# Patient Record
Sex: FEMALE | Race: OTHER | Employment: OTHER | URBAN - METROPOLITAN AREA
[De-identification: names, ages, dates, MRNs, and addresses within clinical notes are randomized per-mention and may not be internally consistent; named-entity substitution may affect disease eponyms.]

---

## 2017-10-17 ENCOUNTER — OFFICE VISIT (OUTPATIENT)
Dept: ORTHOPEDIC SURGERY | Age: 47
End: 2017-10-17

## 2017-10-17 VITALS
DIASTOLIC BLOOD PRESSURE: 82 MMHG | BODY MASS INDEX: 30.49 KG/M2 | HEART RATE: 66 BPM | WEIGHT: 178.6 LBS | SYSTOLIC BLOOD PRESSURE: 123 MMHG | HEIGHT: 64 IN

## 2017-10-17 DIAGNOSIS — M54.12 CERVICAL RADICULAR PAIN: Primary | ICD-10-CM

## 2017-10-17 PROCEDURE — 99203 OFFICE O/P NEW LOW 30 MIN: CPT | Performed by: NURSE PRACTITIONER

## 2017-10-17 PROCEDURE — 72040 X-RAY EXAM NECK SPINE 2-3 VW: CPT | Performed by: NURSE PRACTITIONER

## 2017-10-17 NOTE — LETTER
[address] [facility] [address]  [address] [facility],[address]  [address]  Phone: [phone]  Fax: [address], [address] [facility]        October 20, 2017     [address] [facility] [address]    Patient: Crispin Reese  MR Number: [id number]  YOB: 1970  Date of Visit: 10/17/2017    Dear Dr. Gerald Allenroom:    Thank you for the request for consultation for [facility] to me for the evaluation of   Encounter Diagnosis   Name Primary?  Cervical radicular pain Yes     . Below are the relevant portions of my assessment and plan of care. [facility] Orthopedic Surgery  After Hours Clinic Note      CHIEF COMPLAINT:  Neck pain    History Obtained From:  patient, spouse    HISTORY OF PRESENT ILLNESS:    The patient is a 49 y.o. female who presents with a 10 year history of neck pain that radiates down left arm, but significantly worse over the past few days and month. Pain is described in neck with radiation down left arm and with the intensity of severe. Pain is described as sharp, shooting, rates a 10/10 VAS. Pain is worse with any movement and better with rest. She has accompanying factors of severe headache that is the worst she has ever had. She is having trouble sleeping or doing any activities. She has pain with eating and chewing. She sleeps with her left arm above her head d/t the lymphedema, which she feels places stress on her neck. Years ago, she had Medrol dose pack and Cortisone injections in her trapezius down at  with some improvement in her neck pain, but nothing recent. She was recently at her PCP c/o headache and was treated for a sinus infection and just completed antibiotics with no improvement. She has a h/o CVA in 2001 with residual left sided weakness and Breast Cancer in 2007 with mastectomy, chemotherapy and XRT. She has lymphoedema in her left arm that she has had PT and wears lymphedema sleeve. Past Medical History:        Diagnosis Date    Anxiety     Arthritis     Asthma     Cancer ([facility] [address]     breast    Constipation     Depression     Diabetes mellitus ([facility] [address] [facility]     Gastric ulcer, unspecified as acute or chronic, without mention of hemorrhage, perforation, or obstruction     HIGH CHOLESTEROL     Hypertension     Hypothyroidism     Thyroid disease     TIA (transient ischemic attack)     with occasional left leg and hand weakness       Past Surgical History:        Procedure Laterality Date    BREAST SURGERY      left mastectomy    CARPAL TUNNEL RELEASE      Bilateral    HYSTERECTOMY      TONSILLECTOMY         Social History   Substance Use Topics    Smoking status: Never Smoker    Smokeless tobacco: Not on file    Alcohol use No       Family History   Problem Relation Age of Onset    Asthma Other     Cancer Other     Depression Other     Diabetes Other     Hypertension Other     High Cholesterol Other     Migraines Other            Current Medications:   Current Outpatient Prescriptions   Medication Sig Dispense Refill    loratadine (CLARITIN) 10 MG tablet Take 10 mg by mouth daily      azithromycin (ZITHROMAX) 250 MG tablet Take 500mg (two tablets) on the first day by mouth. Then take 250mg (one tablet) by mouth daily for 4 more days. Complete this medication regimen even if you feel better before it is done. 1 packet 0    cyclobenzaprine (FLEXERIL) 10 MG tablet Take 1 tablet by mouth 3 times daily as needed for Muscle spasms (CAUTION: This medication can cause dizziness.  Do not drive or operate heavy machinery when on this medication.) 20 tablet 0    fluticasone (FLONASE) 50 MCG/ACT nasal spray 1 spray by Nasal route daily 1 Bottle 0    oxyCODONE-acetaminophen (PERCOCET)  MG per tablet Take 1 tablet by mouth every 4 hours as needed for Pain .  Earliest Fill Date: 6/8/17 100 tablet 0    oxyCODONE (OXYCONTIN) 15 MG T12A extended release tablet Take 1 tablet by mouth every 12 hours . Earliest Fill Date: 6/8/17 60 tablet 0    tamoxifen (NOLVADEX) 20 MG tablet Take 1 tablet by mouth daily 90 tablet 6    Elastic Bandages & Supports (FUTURO SUPPORT GLOVE MEDIUM) MISC 1 ready made support glove 1 each 2    Compression Sleeve MISC by Does not apply route 1 sleeve, 20-30 mmHg 1 each 2    ALPRAZolam (XANAX) 1 MG tablet Take 1 mg by mouth nightly as needed for Sleep      TRAZODONE HCL ER PO Take by mouth Indications: takes 20mg daily      LYRICA 50 MG capsule TAKE 1 CAPSULE BY MOUTH TWICE DAILY 60 capsule 0    vitamin D (ERGOCALCIFEROL) 28719 UNITS CAPS capsule Take 1 capsule by mouth once a week. 12 capsule 1    diclofenac sodium (VOLTAREN) 1 % GEL Apply 2 g topically 4 times daily as needed. 100 g 4    Diclofenac Sodium (PENNSAID) 1.5 % SOLN Place 10 drops onto the skin 2 times daily as needed for Pain. 2 Bottle 4    insulin lispro (HUMALOG) 100 UNIT/ML injection Inject 30 Units into the skin 3 times daily (before meals).  insulin glargine (LANTUS) 100 UNIT/ML injection Inject 90 Units into the skin nightly.  levothyroxine (SYNTHROID) 150 MCG tablet Take 75 mcg by mouth daily.  montelukast (SINGULAIR) 10 MG tablet Take 10 mg by mouth daily.  duloxetine (CYMBALTA) 60 MG capsule Take 60 mg by mouth 2 times daily.  clonazepam (KLONOPIN) 0.5 MG tablet Take 1 mg by mouth 2 times daily as needed.  senna (SENOKOT) 8.6 MG tablet Take 2 tablets by mouth daily.  lubiprostone (AMITIZA) 24 MCG capsule Take 24 mcg by mouth 2 times daily (with meals).  lisinopril (PRINIVIL;ZESTRIL) 10 MG tablet Take 10 mg by mouth daily.  metoprolol (LOPRESSOR) 25 MG tablet Take 25 mg by mouth 2 times daily.  simvastatin (ZOCOR) 40 MG tablet Take 40 mg by mouth nightly.  furosemide (LASIX) 40 MG tablet Take 40 mg by mouth 2 times daily.  metolazone (ZAROXOLYN) 2.5 MG tablet Take 5 mg by mouth 2 times daily. No current facility-administered medications for this visit. No current facility-administered medications for this visit. Facility-Administered Medications Ordered in Other Visits: butalbital-acetaminophen-caffeine (FIORICET, ESGIC) per tablet 1 tablet, 1 tablet, Oral, Once    Allergies: Iv dye [iodides] and Trazodone and nefazodone    REVIEW OF SYSTEMS:    CONSTITUTIONAL:  positive for  Headache, negative for fever or chills  MUSCULOSKELETAL:  positive for  myalgias, arthralgias, pain, decreased range of motion  All other ROS reviewed in chart or with patient or family and are grossly negative. PHYSICAL EXAM:    VITALS:  /82   Pulse 66   Ht 5' 4\" (1.626 m)   Wt 178 lb 9.6 oz (81 kg)   BMI 30.66 kg/m²      Ms. Sandra Cotton is a very pleasant 49 y.o. [address] [facility] female who presents today in no acute distress, awake, alert, and oriented. She is well dressed, nourished and  groomed. Patient with normal affect. Height is  5' 4\" (1.626 m), weight is 178 lb 9.6 oz (81 kg), Body mass index is 30.66 kg/m². Resting respiratory rate is 16. Skin warm and dry. Resp deep and easy. Pulse is with regular rate and rhythm       CERVICAL EXAMINATION:  · Inspection: Local inspection shows no step-off or bruising. Cervical alignment is normal.     · Palpation: There is tenderness at the midline, and trapezius bilaterally. Paraspinal tenderness is present. · Range of Motion: decreased d/t pain  · Strength: 5/5 bilateral upper extremities   · Special Tests:    ·   Spurling's positive to the left. ·   Peck and Impingement tests are negative bilaterally. ·  Cubital and Carpal tunnel Tinel's negative bilaterally. · Skin:There are no rashes, ulcerations or lesions in right & left upper extremities.   · Gait & station: normal, patient ambulates without assistance     · Additional Examinations:       · RIGHT UPPER EXTREMITY:  Inspection/examination of the right upper

## 2017-10-18 RX ORDER — METHYLPREDNISOLONE 4 MG/1
TABLET ORAL
Qty: 1 KIT | Refills: 0 | Status: ON HOLD | OUTPATIENT
Start: 2017-10-18 | End: 2018-01-19 | Stop reason: HOSPADM

## 2017-10-18 NOTE — PROGRESS NOTES
/82   Pulse 66   Ht 5' 4\" (1.626 m)   Wt 178 lb 9.6 oz (81 kg)   BMI 30.66 kg/m²     Ms. Argenis Alcala is a very pleasant 49 y.o. [address] [facility] female who presents today in no acute distress, awake, alert, and oriented. She is well dressed, nourished and  groomed. Patient with normal affect. Height is  5' 4\" (1.626 m), weight is 178 lb 9.6 oz (81 kg), Body mass index is 30.66 kg/m². Resting respiratory rate is 16. Skin warm and dry. Resp deep and easy. Pulse is with regular rate and rhythm       CERVICAL EXAMINATION:  · Inspection: Local inspection shows no step-off or bruising. Cervical alignment is normal.     · Palpation: There is tenderness at the midline, and trapezius bilaterally. Paraspinal tenderness is present. · Range of Motion: decreased d/t pain  · Strength: 5/5 bilateral upper extremities   · Special Tests:    ·   Spurling's positive to the left. ·   Peck and Impingement tests are negative bilaterally. ·  Cubital and Carpal tunnel Tinel's negative bilaterally. · Skin:There are no rashes, ulcerations or lesions in right & left upper extremities. · Gait & station: normal, patient ambulates without assistance     · Additional Examinations:       · RIGHT UPPER EXTREMITY:  Inspection/examination of the right upper extremity does not show any tenderness, deformity or injury. Range of motion is unremarkable. There is no gross instability. There are no rashes, ulcerations or lesions. Strength and tone are normal.  · LEFT UPPER EXTREMITY: Inspection/examination of the left upper extremity does not show any tenderness, deformity or injury. Range of motion is decreased d/t pain. There is no gross instability. There are no rashes, ulcerations or lesions. Strength and tone are normal. There is swelling present in left arm d/t lymphedema.     NEUROLOGIC:   Sensory:    Touch:                     Right Upper Extremity:  normal                   Left Upper Extremity:  normal Right Lower Extremity:  normal                  Left Lower Extremity:  normal        DATA:    CBC:   Lab Results   Component Value Date    WBC 5.9 05/16/2017    WBC 6.7 07/08/2010    RBC 3.57 05/16/2017    HGB 9.9 05/16/2017    HCT 33.5 05/16/2017    MCV 93.8 05/16/2017    MCH 27.7 05/16/2017    MCHC 29.6 05/16/2017    RDW 12.5 05/16/2017     05/16/2017    MPV 8.6 07/08/2010     WBC:    Lab Results   Component Value Date    WBC 5.9 05/16/2017    WBC 6.7 07/08/2010     PT/INR:  No results found for: PROTIME, INR  PTT:  No results found for: APTT[APTT        Radiology Review:  Xrays 2 views of the cervical spine and showed loss of the normal cervical lordosis. IMPRESSION/RECOMMENDATIONS  Neck pain  Cervical muscle strain    I discussed the findings with the patient. Will give her Flexeril and Medrol dose pack. She was instructed to rest and apply heat. She was instructed to follow up with spine team in 2 weeks. With the patient c/o the worst headache of her life, she was taken over to [facility] ER for further evaluation.      Malka Sullivan CNP  10/17/2017  8:45 PM

## 2017-10-21 NOTE — COMMUNICATION BODY
[facility] Orthopedic Surgery  After Hours Clinic Note      CHIEF COMPLAINT:  Neck pain    History Obtained From:  patient, spouse    HISTORY OF PRESENT ILLNESS:    The patient is a 49 y.o. female who presents with a 10 year history of neck pain that radiates down left arm, but significantly worse over the past few days and month. Pain is described in neck with radiation down left arm and with the intensity of severe. Pain is described as sharp, shooting, rates a 10/10 VAS. Pain is worse with any movement and better with rest. She has accompanying factors of severe headache that is the worst she has ever had. She is having trouble sleeping or doing any activities. She has pain with eating and chewing. She sleeps with her left arm above her head d/t the lymphedema, which she feels places stress on her neck. Years ago, she had Medrol dose pack and Cortisone injections in her trapezius down at  with some improvement in her neck pain, but nothing recent. She was recently at her PCP c/o headache and was treated for a sinus infection and just completed antibiotics with no improvement. She has a h/o CVA in 2001 with residual left sided weakness and Breast Cancer in 2007 with mastectomy, chemotherapy and XRT. She has lymphoedema in her left arm that she has had PT and wears lymphedema sleeve.      Past Medical History:        Diagnosis Date    Anxiety     Arthritis     Asthma     Cancer ([address]     breast    Constipation     Depression     Diabetes mellitus (HCC)     Gastric ulcer, unspecified as acute or chronic, without mention of hemorrhage, perforation, or obstruction     HIGH CHOLESTEROL     Hypertension     Hypothyroidism     Thyroid disease     TIA (transient ischemic attack)     with occasional left leg and hand weakness       Past Surgical History:        Procedure Laterality Date    BREAST SURGERY      left mastectomy    CARPAL TUNNEL RELEASE      Bilateral    HYSTERECTOMY      TONSILLECTOMY Social History   Substance Use Topics    Smoking status: Never Smoker    Smokeless tobacco: Not on file    Alcohol use No       Family History   Problem Relation Age of Onset    Asthma Other     Cancer Other     Depression Other     Diabetes Other     Hypertension Other     High Cholesterol Other     Migraines Other            Current Medications:   Current Outpatient Prescriptions   Medication Sig Dispense Refill    loratadine (CLARITIN) 10 MG tablet Take 10 mg by mouth daily      azithromycin (ZITHROMAX) 250 MG tablet Take 500mg (two tablets) on the first day by mouth. Then take 250mg (one tablet) by mouth daily for 4 more days. Complete this medication regimen even if you feel better before it is done. 1 packet 0    cyclobenzaprine (FLEXERIL) 10 MG tablet Take 1 tablet by mouth 3 times daily as needed for Muscle spasms (CAUTION: This medication can cause dizziness.  Do not drive or operate heavy machinery when on this medication.) 20 tablet 0    fluticasone (FLONASE) 50 MCG/ACT nasal spray 1 spray by Nasal route daily 1 Bottle 0    oxyCODONE-acetaminophen (PERCOCET)  MG per tablet Take 1 tablet by mouth every 4 hours as needed for Pain . Earliest Fill Date: 6/8/17 100 tablet 0    oxyCODONE (OXYCONTIN) 15 MG T12A extended release tablet Take 1 tablet by mouth every 12 hours .  Earliest Fill Date: 6/8/17 60 tablet 0    tamoxifen (NOLVADEX) 20 MG tablet Take 1 tablet by mouth daily 90 tablet 6    Elastic Bandages & Supports (FUTURO SUPPORT GLOVE MEDIUM) MISC 1 ready made support glove 1 each 2    Compression Sleeve MISC by Does not apply route 1 sleeve, 20-30 mmHg 1 each 2    ALPRAZolam (XANAX) 1 MG tablet Take 1 mg by mouth nightly as needed for Sleep      TRAZODONE HCL ER PO Take by mouth Indications: takes 20mg daily      LYRICA 50 MG capsule TAKE 1 CAPSULE BY MOUTH TWICE DAILY 60 capsule 0    vitamin D (ERGOCALCIFEROL) 26438 UNITS CAPS capsule Take 1 capsule by mouth once a week. 12 capsule 1    diclofenac sodium (VOLTAREN) 1 % GEL Apply 2 g topically 4 times daily as needed. 100 g 4    Diclofenac Sodium (PENNSAID) 1.5 % SOLN Place 10 drops onto the skin 2 times daily as needed for Pain. 2 Bottle 4    insulin lispro (HUMALOG) 100 UNIT/ML injection Inject 30 Units into the skin 3 times daily (before meals).  insulin glargine (LANTUS) 100 UNIT/ML injection Inject 90 Units into the skin nightly.  levothyroxine (SYNTHROID) 150 MCG tablet Take 75 mcg by mouth daily.  montelukast (SINGULAIR) 10 MG tablet Take 10 mg by mouth daily.  duloxetine (CYMBALTA) 60 MG capsule Take 60 mg by mouth 2 times daily.  clonazepam (KLONOPIN) 0.5 MG tablet Take 1 mg by mouth 2 times daily as needed.  senna (SENOKOT) 8.6 MG tablet Take 2 tablets by mouth daily.  lubiprostone (AMITIZA) 24 MCG capsule Take 24 mcg by mouth 2 times daily (with meals).  lisinopril (PRINIVIL;ZESTRIL) 10 MG tablet Take 10 mg by mouth daily.  metoprolol (LOPRESSOR) 25 MG tablet Take 25 mg by mouth 2 times daily.  simvastatin (ZOCOR) 40 MG tablet Take 40 mg by mouth nightly.  furosemide (LASIX) 40 MG tablet Take 40 mg by mouth 2 times daily.  metolazone (ZAROXOLYN) 2.5 MG tablet Take 5 mg by mouth 2 times daily. No current facility-administered medications for this visit. No current facility-administered medications for this visit. Facility-Administered Medications Ordered in Other Visits: butalbital-acetaminophen-caffeine (FIORICET, ESGIC) per tablet 1 tablet, 1 tablet, Oral, Once    Allergies: Iv dye [iodides] and Trazodone and nefazodone    REVIEW OF SYSTEMS:    CONSTITUTIONAL:  positive for  Headache, negative for fever or chills  MUSCULOSKELETAL:  positive for  myalgias, arthralgias, pain, decreased range of motion  All other ROS reviewed in chart or with patient or family and are grossly negative.        PHYSICAL EXAM:    VITALS: /82   Pulse 66   Ht 5' 4\" (1.626 m)   Wt 178 lb 9.6 oz (81 kg)   BMI 30.66 kg/m²     Ms. Natalio Navarro is a very pleasant 49 y.o. [address] [facility] female who presents today in no acute distress, awake, alert, and oriented. She is well dressed, nourished and  groomed. Patient with normal affect. Height is  5' 4\" (1.626 m), weight is 178 lb 9.6 oz (81 kg), Body mass index is 30.66 kg/m². Resting respiratory rate is 16. Skin warm and dry. Resp deep and easy. Pulse is with regular rate and rhythm       CERVICAL EXAMINATION:  · Inspection: Local inspection shows no step-off or bruising. Cervical alignment is normal.     · Palpation: There is tenderness at the midline, and trapezius bilaterally. Paraspinal tenderness is present. · Range of Motion: decreased d/t pain  · Strength: 5/5 bilateral upper extremities   · Special Tests:    ·   Spurling's positive to the left. ·   Peck and Impingement tests are negative bilaterally. ·  Cubital and Carpal tunnel Tinel's negative bilaterally. · Skin:There are no rashes, ulcerations or lesions in right & left upper extremities. · Gait & station: normal, patient ambulates without assistance     · Additional Examinations:       · RIGHT UPPER EXTREMITY:  Inspection/examination of the right upper extremity does not show any tenderness, deformity or injury. Range of motion is unremarkable. There is no gross instability. There are no rashes, ulcerations or lesions. Strength and tone are normal.  · LEFT UPPER EXTREMITY: Inspection/examination of the left upper extremity does not show any tenderness, deformity or injury. Range of motion is decreased d/t pain. There is no gross instability. There are no rashes, ulcerations or lesions. Strength and tone are normal. There is swelling present in left arm d/t lymphedema.     NEUROLOGIC:   Sensory:    Touch:                     Right Upper Extremity:  normal                   Left Upper Extremity:  normal

## 2017-10-31 ENCOUNTER — OFFICE VISIT (OUTPATIENT)
Dept: CARDIOLOGY CLINIC | Age: 47
End: 2017-10-31

## 2017-10-31 VITALS
DIASTOLIC BLOOD PRESSURE: 73 MMHG | SYSTOLIC BLOOD PRESSURE: 122 MMHG | HEIGHT: 64 IN | HEART RATE: 91 BPM | WEIGHT: 182 LBS | BODY MASS INDEX: 31.07 KG/M2

## 2017-10-31 DIAGNOSIS — R07.9 CHEST PAIN, UNSPECIFIED TYPE: Primary | ICD-10-CM

## 2017-10-31 PROCEDURE — G8427 DOCREV CUR MEDS BY ELIG CLIN: HCPCS | Performed by: INTERNAL MEDICINE

## 2017-10-31 PROCEDURE — G8484 FLU IMMUNIZE NO ADMIN: HCPCS | Performed by: INTERNAL MEDICINE

## 2017-10-31 PROCEDURE — 93000 ELECTROCARDIOGRAM COMPLETE: CPT | Performed by: INTERNAL MEDICINE

## 2017-10-31 PROCEDURE — G8417 CALC BMI ABV UP PARAM F/U: HCPCS | Performed by: INTERNAL MEDICINE

## 2017-10-31 PROCEDURE — 99204 OFFICE O/P NEW MOD 45 MIN: CPT | Performed by: INTERNAL MEDICINE

## 2017-10-31 PROCEDURE — 1036F TOBACCO NON-USER: CPT | Performed by: INTERNAL MEDICINE

## 2017-10-31 NOTE — PROGRESS NOTES
[facility]  Cardiac Consult     Referring Provider:  Raissa Aguilar     Chief Complaint   Patient presents with   Juancho Veliz Cardiologist    Chest Pain     Pt has been having chest pains that have gotten worse in the last month. History of Present Illness:  50 y/o female with h/o breast CA s/p mastectomy presents for consultation for chest pain. History taken with assistance of interpretor. She has a h/o CAD with remote cardiac cath at [facility].  says that attempt was made for stent placement but was unsuccessful. I do not have those records. Repeat cardiac cath at [facility] 2014 showed diffuse LAD disease and small vessel with normal RCA and LCX. Treated medically. She apparently has had issues with fluid overload. Multiple ECHO's normal. Cardiac MRI performed and was normal with ejection fraction of 61% and no evidence of constriction. Last stress 2/2016 was normal.    She has multiple complaints. She has dyspnea with exertion. Also sharp chest and back pain. Relieved by rest. Present for months. Also complains of bloating. She has lymphedema of left arm from prior mastectomy. Walked in [place] today. Walked 50 ft and felt dyspneic with pain in left back. SaO2 stayed in 95% range. Past Medical History:   has a past medical history of Anxiety; Arthritis; Asthma; Cancer ([address] Constipation; Depression; Diabetes mellitus ([address] Gastric ulcer, unspecified as acute or chronic, without mention of hemorrhage, perforation, or obstruction; HIGH CHOLESTEROL; Hypertension; Hypothyroidism; Thyroid disease; and TIA (transient ischemic attack). Surgical History:   has a past surgical history that includes Breast surgery; Hysterectomy; Carpal tunnel release; and Tonsillectomy. Social History:   reports that she has never smoked. She has never used smokeless tobacco. She reports that she does not drink alcohol or use drugs.      Family History:  family history includes Asthma in an other family capsule Take 1 capsule by mouth once a week. 12 capsule 1    diclofenac sodium (VOLTAREN) 1 % GEL Apply 2 g topically 4 times daily as needed. 100 g 4    Diclofenac Sodium (PENNSAID) 1.5 % SOLN Place 10 drops onto the skin 2 times daily as needed for Pain. 2 Bottle 4    insulin lispro (HUMALOG) 100 UNIT/ML injection Inject 30 Units into the skin 3 times daily (before meals).  insulin glargine (LANTUS) 100 UNIT/ML injection Inject 90 Units into the skin nightly.  levothyroxine (SYNTHROID) 150 MCG tablet Take 75 mcg by mouth daily.  montelukast (SINGULAIR) 10 MG tablet Take 10 mg by mouth daily.  duloxetine (CYMBALTA) 60 MG capsule Take 60 mg by mouth 2 times daily.  clonazepam (KLONOPIN) 0.5 MG tablet Take 1 mg by mouth 2 times daily as needed.  lubiprostone (AMITIZA) 24 MCG capsule Take 24 mcg by mouth 2 times daily (with meals).  lisinopril (PRINIVIL;ZESTRIL) 10 MG tablet Take 10 mg by mouth daily.  simvastatin (ZOCOR) 40 MG tablet Take 40 mg by mouth nightly.  furosemide (LASIX) 40 MG tablet Take 40 mg by mouth 2 times daily.  [DISCONTINUED] metolazone (ZAROXOLYN) 2.5 MG tablet Take 5 mg by mouth 2 times daily. No facility-administered encounter medications on file as of 10/31/2017. [x] Medications and dosages reviewed.     ROS:  [x]Full ROS obtained and negative except as mentioned in HPI      Physical Examination:    Vitals:    10/31/17 1025   BP: 122/73   Pulse: 91        · GENERAL: Frail chronically ill appearing female in NAD  · NEUROLOGICAL: Alert and oriented  · PSYCH: Calm affect  · SKIN: Warm and dry  · HEENT: Normocephalic, Sclera non-icteric, mucus membranes moist  · NECK: supple, JVP normal  · CAROTID: Normal upstroke, no bruits  · CARDIAC: Normal PMI, regular rate and rhythm, normal S1S2, no murmur, rub, or gallop  · RESPIRATORY: Normal respiratory effort, clear to auscultation bilaterally  · EXTREMITIES: Lymphedema of left arm  · MUSCULOSKELETAL: No joint swelling or tenderness, no chest wall tenderness  · GASTROINTESTINAL: normal bowel sounds, soft, non-tender, no bruit      Assessment:     Chest pain/dyspnea: Etiology unclear. Extensive cardiac evaluation in past all fairly normal, except LAD described as small caliber and diffusely diseased. Stress myoview 2/2016 normal however. With all of her complaints, I think we need to reevaluate with lexiscan myoview and ECHO. I have also asked to obtain cath films to review. Plan:  ECHO, Myoview and Cath films  F/u to review.     Thank you for allowing me to participate in the care of this individual.      Bryon Barros M.D., [facility]

## 2017-10-31 NOTE — LETTER
[address] [address] [address] [address]  Phone: [phone]  Fax: [phone]    Mónica Stuart MD        November 1, 2017     [address] [place], [place] [address] [address]    Patient: Crispin Reese  MR Number: [id number]  YOB: 1970  Date of Visit: 10/31/2017        [address]  Cardiac Consult     Referring Provider:  Gerald Mars     Chief Complaint   Patient presents with   [facility] Establish Cardiologist    Chest Pain     Pt has been having chest pains that have gotten worse in the last month. History of Present Illness:  50 y/o female with h/o breast CA s/p mastectomy presents for consultation for chest pain. History taken with assistance of interpretor. She has a h/o CAD with remote cardiac cath at [facility].  says that attempt was made for stent placement but was unsuccessful. I do not have those records. Repeat cardiac cath at [facility] 2014 showed diffuse LAD disease and small vessel with normal RCA and LCX. Treated medically. She apparently has had issues with fluid overload. Multiple ECHO's normal. Cardiac MRI performed and was normal with ejection fraction of 61% and no evidence of constriction. Last stress 2/2016 was normal.    She has multiple complaints. She has dyspnea with exertion. Also sharp chest and back pain. Relieved by rest. Present for months. Also complains of bloating. She has lymphedema of left arm from prior mastectomy. Walked in [place] today. Walked 50 ft and felt dyspneic with pain in left back. SaO2 stayed in 95% range. Past Medical History:   has a past medical history of Anxiety; Arthritis; Asthma; Cancer ([address] Constipation; Depression; Diabetes mellitus ([address] Gastric ulcer, unspecified as acute or chronic, without mention of hemorrhage, perforation, or obstruction; HIGH CHOLESTEROL; Hypertension; Hypothyroidism; Thyroid disease; and TIA (transient ischemic attack). Surgical History:   has a past surgical history that includes Breast surgery; Hysterectomy; Carpal tunnel release; and Tonsillectomy. Social History:   reports that she has never smoked. She has never used smokeless tobacco. She reports that she does not drink alcohol or use drugs. Family History:  family history includes Asthma in an other family member; Cancer in an other family member; Depression in an other family member; Diabetes in an other family member; High Cholesterol in an other family member; Hypertension in an other family member; Migraines in an other family member. Allergies: Iv dye [iodides] and Trazodone and nefazodone     Home Medications:  Outpatient Encounter Prescriptions as of 10/31/2017   Medication Sig Dispense Refill    senna (SENOKOT) 8.6 MG tablet Take 2 tablets by mouth daily.  metoprolol (LOPRESSOR) 25 MG tablet Take 50 mg by mouth 2 times daily       methylPREDNISolone (MEDROL DOSEPACK) 4 MG tablet Take by mouth. 1 kit 0    loratadine (CLARITIN) 10 MG tablet Take 10 mg by mouth daily      azithromycin (ZITHROMAX) 250 MG tablet Take 500mg (two tablets) on the first day by mouth. Then take 250mg (one tablet) by mouth daily for 4 more days. Complete this medication regimen even if you feel better before it is done. 1 packet 0    fluticasone (FLONASE) 50 MCG/ACT nasal spray 1 spray by Nasal route daily 1 Bottle 0    oxyCODONE-acetaminophen (PERCOCET)  MG per tablet Take 1 tablet by mouth every 4 hours as needed for Pain . Earliest Fill Date: 6/8/17 100 tablet 0    oxyCODONE (OXYCONTIN) 15 MG T12A extended release tablet Take 1 tablet by mouth every 12 hours .  Earliest Fill Date: 6/8/17 60 tablet 0    tamoxifen (NOLVADEX) 20 MG tablet Take 1 tablet by mouth daily 90 tablet 6    Elastic Bandages & Supports (FUTURO SUPPORT GLOVE MEDIUM) MISC 1 ready made support glove 1 each 2    Compression Sleeve MISC by Does not apply route 1 sleeve, 20-30 mmHg 1 each 2    ALPRAZolam (XANAX) 1 MG tablet Take 1 mg by mouth nightly as needed for Sleep      TRAZODONE HCL ER PO Take by mouth Indications: takes 20mg daily      LYRICA 50 MG capsule TAKE 1 CAPSULE BY MOUTH TWICE DAILY (Patient taking differently: Take 25mg in the Morning and 75 mg at night.) 60 capsule 0    vitamin D (ERGOCALCIFEROL) 15153 UNITS CAPS capsule Take 1 capsule by mouth once a week. 12 capsule 1    diclofenac sodium (VOLTAREN) 1 % GEL Apply 2 g topically 4 times daily as needed. 100 g 4    Diclofenac Sodium (PENNSAID) 1.5 % SOLN Place 10 drops onto the skin 2 times daily as needed for Pain. 2 Bottle 4    insulin lispro (HUMALOG) 100 UNIT/ML injection Inject 30 Units into the skin 3 times daily (before meals).  insulin glargine (LANTUS) 100 UNIT/ML injection Inject 90 Units into the skin nightly.  levothyroxine (SYNTHROID) 150 MCG tablet Take 75 mcg by mouth daily.  montelukast (SINGULAIR) 10 MG tablet Take 10 mg by mouth daily.  duloxetine (CYMBALTA) 60 MG capsule Take 60 mg by mouth 2 times daily.  clonazepam (KLONOPIN) 0.5 MG tablet Take 1 mg by mouth 2 times daily as needed.  lubiprostone (AMITIZA) 24 MCG capsule Take 24 mcg by mouth 2 times daily (with meals).  lisinopril (PRINIVIL;ZESTRIL) 10 MG tablet Take 10 mg by mouth daily.  simvastatin (ZOCOR) 40 MG tablet Take 40 mg by mouth nightly.  furosemide (LASIX) 40 MG tablet Take 40 mg by mouth 2 times daily.  [DISCONTINUED] metolazone (ZAROXOLYN) 2.5 MG tablet Take 5 mg by mouth 2 times daily. No facility-administered encounter medications on file as of 10/31/2017. Medications and dosages reviewed.     ROS:  Full ROS obtained and negative except as mentioned in HPI      Physical Examination:    Vitals:    10/31/17 1025   BP: 122/73   Pulse: 91        · GENERAL: Frail chronically ill appearing female in NAD  · NEUROLOGICAL: Alert and oriented

## 2017-10-31 NOTE — PATIENT INSTRUCTIONS
Lexiscan Myoview Stress test and 2D echo next Wednesday 11/8/2017. Will get pictures/images from last cardiac angiogram at [facility]. Patient to sign a release. Follow up at 12:4 pm 11/8/2017 with me.

## 2017-11-01 NOTE — COMMUNICATION BODY
[facility]  Cardiac Consult     Referring Provider:  Diana Marrero     Chief Complaint   Patient presents with   [facility] Establish Cardiologist    Chest Pain     Pt has been having chest pains that have gotten worse in the last month. History of Present Illness:  50 y/o female with h/o breast CA s/p mastectomy presents for consultation for chest pain. History taken with assistance of interpretor. She has a h/o CAD with remote cardiac cath [place] [facility].  says that attempt was made for stent placement but was unsuccessful. I do not have those records. Repeat cardiac cath at [facility] 2014 showed diffuse LAD disease and small vessel with normal RCA and LCX. Treated medically. She apparently has had issues with fluid overload. Multiple ECHO's normal. Cardiac MRI performed and was normal with ejection fraction of 61% and no evidence of constriction. Last stress 2/2016 was normal.    She has multiple complaints. She has dyspnea with exertion. Also sharp chest and back pain. Relieved by rest. Present for months. Also complains of bloating. She has lymphedema of left arm from prior mastectomy. Walked in [place] today. Walked 50 ft and felt dyspneic with pain in left back. SaO2 stayed in 95% range. Past Medical History:   has a past medical history of Anxiety; Arthritis; Asthma; Cancer ([address] Constipation; Depression; Diabetes mellitus ([address] Gastric ulcer, unspecified as acute or chronic, without mention of hemorrhage, perforation, or obstruction; HIGH CHOLESTEROL; Hypertension; Hypothyroidism; Thyroid disease; and TIA (transient ischemic attack). Surgical History:   has a past surgical history that includes Breast surgery; Hysterectomy; Carpal tunnel release; and Tonsillectomy. Social History:   reports that she has never smoked. She has never used smokeless tobacco. She reports that she does not drink alcohol or use drugs.      Family History:  family history includes Asthma in an other family

## 2017-11-02 ENCOUNTER — OFFICE VISIT (OUTPATIENT)
Dept: ORTHOPEDIC SURGERY | Age: 47
End: 2017-11-02

## 2017-11-02 VITALS
BODY MASS INDEX: 29.99 KG/M2 | DIASTOLIC BLOOD PRESSURE: 68 MMHG | SYSTOLIC BLOOD PRESSURE: 100 MMHG | HEART RATE: 84 BPM | HEIGHT: 65 IN | WEIGHT: 180 LBS

## 2017-11-02 DIAGNOSIS — M54.12 CERVICAL RADICULITIS: ICD-10-CM

## 2017-11-02 DIAGNOSIS — M54.2 NECK PAIN: Primary | ICD-10-CM

## 2017-11-02 PROCEDURE — 99203 OFFICE O/P NEW LOW 30 MIN: CPT | Performed by: NURSE PRACTITIONER

## 2017-11-02 PROCEDURE — G8484 FLU IMMUNIZE NO ADMIN: HCPCS | Performed by: NURSE PRACTITIONER

## 2017-11-02 PROCEDURE — 1036F TOBACCO NON-USER: CPT | Performed by: NURSE PRACTITIONER

## 2017-11-02 PROCEDURE — G8417 CALC BMI ABV UP PARAM F/U: HCPCS | Performed by: NURSE PRACTITIONER

## 2017-11-02 PROCEDURE — G8427 DOCREV CUR MEDS BY ELIG CLIN: HCPCS | Performed by: NURSE PRACTITIONER

## 2017-11-02 RX ORDER — GABAPENTIN 300 MG/1
300 CAPSULE ORAL 3 TIMES DAILY
Qty: 40 CAPSULE | Refills: 0 | Status: SHIPPED | OUTPATIENT
Start: 2017-11-02 | End: 2017-11-15 | Stop reason: SDUPTHER

## 2017-11-02 RX ORDER — PREDNISONE 10 MG/1
TABLET ORAL
Qty: 42 TABLET | Refills: 0 | Status: ON HOLD | OUTPATIENT
Start: 2017-11-02 | End: 2018-01-13 | Stop reason: ALTCHOICE

## 2017-11-02 RX ORDER — CYCLOBENZAPRINE HCL 10 MG
10 TABLET ORAL 3 TIMES DAILY PRN
Qty: 40 TABLET | Refills: 0 | Status: SHIPPED | OUTPATIENT
Start: 2017-11-02 | End: 2017-11-15 | Stop reason: SDUPTHER

## 2017-11-02 NOTE — PROGRESS NOTES
History of present illness:   Ms. Isaac Chirinos is a pleasant 49 y.o. old female with a PMH of depression, anxiety, CVA with residual left-sided weakness, DM type II, breast ca kindly referred by Shea Santiago CNP for consultation regarding Ms. Crow Bonner's neck, and bilateral, left>right arm pain. Patient describes chronic neck pain and notes that she has been experiencing left sided numbness with associated pain in all 5 fingers of her left hand. She is currently on OxyContin and oxycodone as well as Flexeril for pain control. She is under the management of a chronic pain provider. The patient did recently try a Medrol Dosepak moderate relief. She reports some difficulty with fine motor control. She has tried trigger point injections and possibly ESIs in the past.  The visit today was completed with a  via the telephone. Past history:   Her past medical, surgical, social history has been reviewed. Her  medications and allergies were reviewed. Review of symptoms:   Pertinent items are noted in HPI. Review of systems reviewed from Patient History For dated on 10/17/17 and available in the patient's chart under the Media tab. Physical examination:   Ms. Ruby Duckworth most recent vitals:  Vitals  BP: 100/68  Pulse: 84  Height: 5' 4.8\" (164.6 cm)  Weight: 180 lb (81.6 kg)  Body mass index is 30.14 kg/m². General Exam:  She is well-developed and well-nourished, is in some obvious pain and alert and oriented to person, place, and time. She demonstrates appropriate mood and affect. She walks with a normal gait. HEENT:   Her cervical flexion, extension, and axial rotation are moderately reduced with pain. Her skin is warm and dry. Her has no tenderness over her cervical spine and no obvious muscle spasm. The skin over her cervical spine is normal without surgical scar.      Upper Extremities:  She has 4+/5 strength of her interosseous muscles, wrist dorsiflexors and volarflexors, biceps, triceps, deltoids, and internal and external rotators of her shoulders, bilaterally. Her biceps, triceps, bracheoradialis, quadriceps and achilles reflexes are 2+, bilaterally. Sensation is intact to light touchfrom C6 to C8. She has no clonus and negative Velazquez's bilaterally. There is apparent lymphedema throughout the left upper extremity. Imaging:   I reviewed 2 views of the cervical spine obtained on 1/17/17 in the office today. They show mild degenerative disc disease. Assessment:   Cervical radiculopathy     Plan:   We discussed treatment options including observation, epidural injections, physical therapy, chiropractic care and further imaging. A prednisone taper was sent patient's pharmacy as well as a refilled Flexeril and gabapentin. She was advised on appropriate administration instructions regarding this. Patient will be referred to Dr. Tasha Fortune as they would prefer to see if female provider near [facility] and they would like to consider injections if applicable. I did order an MRI of the cervical spine in the office today given her progressive difficulty with fine motor control.

## 2017-11-03 ENCOUNTER — TELEPHONE (OUTPATIENT)
Dept: ORTHOPEDIC SURGERY | Age: 47
End: 2017-11-03

## 2017-11-08 ENCOUNTER — OFFICE VISIT (OUTPATIENT)
Dept: CARDIOLOGY CLINIC | Age: 47
End: 2017-11-08

## 2017-11-08 ENCOUNTER — HOSPITAL ENCOUNTER (OUTPATIENT)
Dept: NON INVASIVE DIAGNOSTICS | Age: 47
Discharge: HOME | End: 2017-11-08
Admitting: INTERNAL MEDICINE

## 2017-11-08 VITALS
SYSTOLIC BLOOD PRESSURE: 116 MMHG | HEART RATE: 75 BPM | DIASTOLIC BLOOD PRESSURE: 62 MMHG | BODY MASS INDEX: 31.41 KG/M2 | WEIGHT: 184 LBS | HEIGHT: 64 IN

## 2017-11-08 DIAGNOSIS — R07.9 CHEST PAIN: ICD-10-CM

## 2017-11-08 DIAGNOSIS — R60.1 GENERALIZED EDEMA: ICD-10-CM

## 2017-11-08 DIAGNOSIS — E78.49 OTHER HYPERLIPIDEMIA: ICD-10-CM

## 2017-11-08 DIAGNOSIS — R07.9 CHEST PAIN, UNSPECIFIED TYPE: Primary | ICD-10-CM

## 2017-11-08 LAB
LEFT VENTRICULAR EJECTION FRACTION HIGH VALUE: 60 %
LEFT VENTRICULAR EJECTION FRACTION MODE: NORMAL
LV EF: 60 %
LV EF: 60 %
LV EF: 84 %
LVEF MODALITY: NORMAL
LVEF MODALITY: NORMAL

## 2017-11-08 PROCEDURE — 99214 OFFICE O/P EST MOD 30 MIN: CPT | Performed by: INTERNAL MEDICINE

## 2017-11-08 PROCEDURE — 1036F TOBACCO NON-USER: CPT | Performed by: INTERNAL MEDICINE

## 2017-11-08 PROCEDURE — G8417 CALC BMI ABV UP PARAM F/U: HCPCS | Performed by: INTERNAL MEDICINE

## 2017-11-08 PROCEDURE — G8484 FLU IMMUNIZE NO ADMIN: HCPCS | Performed by: INTERNAL MEDICINE

## 2017-11-08 PROCEDURE — G8427 DOCREV CUR MEDS BY ELIG CLIN: HCPCS | Performed by: INTERNAL MEDICINE

## 2017-11-08 RX ORDER — AMINOPHYLLINE DIHYDRATE 25 MG/ML
100 INJECTION, SOLUTION INTRAVENOUS ONCE
Status: COMPLETED | OUTPATIENT
Start: 2017-11-08 | End: 2017-11-08

## 2017-11-08 RX ORDER — OMEGA-3-ACID ETHYL ESTERS 1 G/1
2 CAPSULE, LIQUID FILLED ORAL 2 TIMES DAILY
COMMUNITY
Start: 2017-11-08 | End: 2018-08-02

## 2017-11-08 RX ADMIN — AMINOPHYLLINE DIHYDRATE 100 MG: 25 INJECTION, SOLUTION INTRAVENOUS at 09:06

## 2017-11-08 NOTE — COMMUNICATION BODY
[address]  Cardiac Consult     Referring Provider:  [facility]     Chief Complaint   Patient presents with    Check-Up     No cardiac complaints. Echo/Stress test prior. History of Present Illness:  50 y/o female with h/o breast CA s/p mastectomy presents for consultation for chest pain. History taken with assistance of interpretor. She has a h/o CAD with [place] [facility]  says that attempt was made for stent placement but was unsuccessful. I do not have those records. Repeat cardiac cath at [facility] 2014 showed diffuse LAD disease and small vessel with normal RCA and LCX. Treated medically. She apparently has had issues with fluid overload. Multiple ECHO's normal. Cardiac MRI performed and was normal with ejection fraction of 61% and no evidence of constriction. Last stress 2/2016 was normal.    She was seen 2 weeks ago with dyspnea and chest pain. ECHO and myoview ordered which she had today. Both normal. She denies chest pain. Says she has issues with fluid retention. Seems worse when on prednisone. Does not use salt    Past Medical History:   has a past medical history of Anxiety; Arthritis; Asthma; Cancer ([address] Constipation; Depression; Diabetes mellitus ([address] Gastric ulcer, unspecified as acute or chronic, without mention of hemorrhage, perforation, or obstruction; HIGH CHOLESTEROL; Hypertension; Hypothyroidism; Thyroid disease; and TIA (transient ischemic attack). Surgical History:   has a past surgical history that includes Breast surgery; Hysterectomy; Carpal tunnel release; and Tonsillectomy. Social History:   reports that she has never smoked. She has never used smokeless tobacco. She reports that she does not drink alcohol or use drugs.      Family History:  family history includes Asthma in an other family member; Cancer in an other family member; Depression in an other family member; Diabetes in an other family member; High Cholesterol in an other PMI, regular rate and rhythm, normal S1S2, no murmur, rub, or gallop  · RESPIRATORY: Normal respiratory effort, clear to auscultation bilaterally  · EXTREMITIES: Lymphedema of left arm, No LE edema  · MUSCULOSKELETAL: No joint swelling or tenderness, no chest wall tenderness  · GASTROINTESTINAL: normal bowel sounds, soft, non-tender, no bruit      Assessment:     Chest pain/dyspnea: Stress and ECHO again normal. Cath films reviewed. Normal, except there is a distal LAD or septal  that is small and appears intramyocardial with bridging throughout. I do not believe this is symptomatic and stress suggests no ischemia. Stalbe. Hyperlipidemia: controlled on statin    Edema: None today. Weights very stable over last 2 years.  I suspect she may have some retension when on high dose steroids    Plan:  Stable cardiac status  Same meds  F/u 6 months  Reviewed at length with pt and     Thank you for allowing me to participate in the care of this individual.      Ashley Coreas M.D., [facility]

## 2017-11-08 NOTE — PATIENT INSTRUCTIONS
Weight gain can be from the steroids, retains fluid.  Watch sodium intake  Continue with the Lasix( furosemide) twice daily

## 2017-11-08 NOTE — LETTER
has a past surgical history that includes Breast surgery; Hysterectomy; Carpal tunnel release; and Tonsillectomy. Social History:   reports that she has never smoked. She has never used smokeless tobacco. She reports that she does not drink alcohol or use drugs. Family History:  family history includes Asthma in an other family member; Cancer in an other family member; Depression in an other family member; Diabetes in an other family member; High Cholesterol in an other family member; Hypertension in an other family member; Migraines in an other family member. Allergies: Iv dye [iodides] and Trazodone and nefazodone     Home Medications:  Outpatient Encounter Prescriptions as of 11/8/2017   Medication Sig Dispense Refill    omega-3 acid ethyl esters (LOVAZA) 1 g capsule       gabapentin (NEURONTIN) 300 MG capsule Take 1 capsule by mouth 3 times daily 40 capsule 0    predniSONE (DELTASONE) 10 MG tablet 6 tabs day 1&2, 5 tabs day 3&4, 4 tabs day 5&6, 3 tabs day 7&8, 2 tabs day 9&10, 1 tab day 11&12 42 tablet 0    cyclobenzaprine (FLEXERIL) 10 MG tablet Take 1 tablet by mouth 3 times daily as needed for Muscle spasms 40 tablet 0    methylPREDNISolone (MEDROL DOSEPACK) 4 MG tablet Take by mouth. 1 kit 0    loratadine (CLARITIN) 10 MG tablet Take 10 mg by mouth daily      azithromycin (ZITHROMAX) 250 MG tablet Take 500mg (two tablets) on the first day by mouth. Then take 250mg (one tablet) by mouth daily for 4 more days. Complete this medication regimen even if you feel better before it is done. 1 packet 0    fluticasone (FLONASE) 50 MCG/ACT nasal spray 1 spray by Nasal route daily 1 Bottle 0    oxyCODONE-acetaminophen (PERCOCET)  MG per tablet Take 1 tablet by mouth every 4 hours as needed for Pain . Earliest Fill Date: 6/8/17 100 tablet 0    oxyCODONE (OXYCONTIN) 15 MG T12A extended release tablet Take 1 tablet by mouth every 12 hours .  Earliest Fill Date: 6/8/17 60 tablet 0  tamoxifen (NOLVADEX) 20 MG tablet Take 1 tablet by mouth daily 90 tablet 6    Elastic Bandages & Supports (FUTURO SUPPORT GLOVE MEDIUM) MISC 1 ready made support glove 1 each 2    Compression Sleeve MISC by Does not apply route 1 sleeve, 20-30 mmHg 1 each 2    ALPRAZolam (XANAX) 1 MG tablet Take 1 mg by mouth nightly as needed for Sleep      TRAZODONE HCL ER PO Take by mouth Indications: takes 20mg daily      LYRICA 50 MG capsule TAKE 1 CAPSULE BY MOUTH TWICE DAILY (Patient taking differently: Take 25mg in the Morning and 75 mg at night.) 60 capsule 0    vitamin D (ERGOCALCIFEROL) 17313 UNITS CAPS capsule Take 1 capsule by mouth once a week. 12 capsule 1    diclofenac sodium (VOLTAREN) 1 % GEL Apply 2 g topically 4 times daily as needed. 100 g 4    Diclofenac Sodium (PENNSAID) 1.5 % SOLN Place 10 drops onto the skin 2 times daily as needed for Pain. 2 Bottle 4    insulin lispro (HUMALOG) 100 UNIT/ML injection Inject 30 Units into the skin 3 times daily (before meals).  insulin glargine (LANTUS) 100 UNIT/ML injection Inject 90 Units into the skin nightly.  levothyroxine (SYNTHROID) 150 MCG tablet Take 75 mcg by mouth daily.  montelukast (SINGULAIR) 10 MG tablet Take 10 mg by mouth daily.  duloxetine (CYMBALTA) 60 MG capsule Take 60 mg by mouth 2 times daily.  clonazepam (KLONOPIN) 0.5 MG tablet Take 1 mg by mouth 2 times daily as needed.  senna (SENOKOT) 8.6 MG tablet Take 2 tablets by mouth daily.  lubiprostone (AMITIZA) 24 MCG capsule Take 24 mcg by mouth 2 times daily (with meals).  lisinopril (PRINIVIL;ZESTRIL) 10 MG tablet Take 2.5 mg by mouth daily       metoprolol (LOPRESSOR) 25 MG tablet Take 50 mg by mouth 2 times daily       simvastatin (ZOCOR) 40 MG tablet Take 40 mg by mouth nightly.  furosemide (LASIX) 40 MG tablet Take 40 mg by mouth 2 times daily.

## 2017-11-08 NOTE — PROGRESS NOTES
PMI, regular rate and rhythm, normal S1S2, no murmur, rub, or gallop  · RESPIRATORY: Normal respiratory effort, clear to auscultation bilaterally  · EXTREMITIES: Lymphedema of left arm, No LE edema  · MUSCULOSKELETAL: No joint swelling or tenderness, no chest wall tenderness  · GASTROINTESTINAL: normal bowel sounds, soft, non-tender, no bruit      Assessment:     Chest pain/dyspnea: Stress and ECHO again normal. Cath films reviewed. Normal, except there is a distal LAD or septal  that is small and appears intramyocardial with bridging throughout. I do not believe this is symptomatic and stress suggests no ischemia. Stalbe. Hyperlipidemia: controlled on statin    Edema: None today. Weights very stable over last 2 years.  I suspect she may have some retension when on high dose steroids    Plan:  Stable cardiac status  Same meds  F/u 6 months  Reviewed at length with pt and     Thank you for allowing me to participate in the care of this individual.      Shan Castillo M.D., [facility]

## 2017-11-13 ENCOUNTER — TELEPHONE (OUTPATIENT)
Dept: ORTHOPEDIC SURGERY | Age: 47
End: 2017-11-13

## 2017-11-13 NOTE — TELEPHONE ENCOUNTER
Patient spouse  Kev May calling n her behalf  Says that patient needs to see Briana Rosen again- she was seeing him for cervical spine, but he states that she fell the other day and now having back issues    Next [facility] appt with Lizzie Munguia is not until 11/29/17  Can patient be seen sooner    Also asking for a refill of pain med's, did not say what medication  Preferred pharmacy- [facility]  [phone]    Pls call Kev May back at [phone]

## 2017-11-15 ENCOUNTER — TELEPHONE (OUTPATIENT)
Dept: ORTHOPEDIC SURGERY | Age: 47
End: 2017-11-15

## 2017-11-15 RX ORDER — GABAPENTIN 300 MG/1
300 CAPSULE ORAL 3 TIMES DAILY
Qty: 90 CAPSULE | Refills: 0 | Status: SHIPPED | OUTPATIENT
Start: 2017-11-15 | End: 2018-03-13 | Stop reason: ALTCHOICE

## 2017-11-15 RX ORDER — CYCLOBENZAPRINE HCL 10 MG
10 TABLET ORAL 3 TIMES DAILY PRN
Qty: 40 TABLET | Refills: 0 | Status: SHIPPED | OUTPATIENT
Start: 2017-11-15 | End: 2017-11-25

## 2017-11-20 ENCOUNTER — HOSPITAL ENCOUNTER (OUTPATIENT)
Dept: MRI IMAGING | Age: 47
Discharge: HOME | End: 2017-11-20
Attending: NURSE PRACTITIONER | Admitting: NURSE PRACTITIONER

## 2017-11-20 DIAGNOSIS — M54.12 CERVICAL RADICULITIS: ICD-10-CM

## 2017-11-20 DIAGNOSIS — M54.12 RADICULOPATHY OF CERVICAL REGION: ICD-10-CM

## 2017-11-27 ENCOUNTER — OFFICE VISIT (OUTPATIENT)
Dept: ORTHOPEDIC SURGERY | Age: 47
End: 2017-11-27

## 2017-11-27 VITALS
WEIGHT: 190 LBS | HEIGHT: 64 IN | SYSTOLIC BLOOD PRESSURE: 124 MMHG | DIASTOLIC BLOOD PRESSURE: 77 MMHG | BODY MASS INDEX: 32.44 KG/M2

## 2017-11-27 DIAGNOSIS — M47.22 OSTEOARTHRITIS OF SPINE WITH RADICULOPATHY, CERVICAL REGION: Primary | ICD-10-CM

## 2017-11-27 DIAGNOSIS — M50.30 DDD (DEGENERATIVE DISC DISEASE), CERVICAL: ICD-10-CM

## 2017-11-27 DIAGNOSIS — M48.02 CERVICAL SPINAL STENOSIS: ICD-10-CM

## 2017-11-27 PROCEDURE — 99244 OFF/OP CNSLTJ NEW/EST MOD 40: CPT | Performed by: PHYSICAL MEDICINE & REHABILITATION

## 2017-11-27 PROCEDURE — G8427 DOCREV CUR MEDS BY ELIG CLIN: HCPCS | Performed by: PHYSICAL MEDICINE & REHABILITATION

## 2017-11-27 PROCEDURE — G8484 FLU IMMUNIZE NO ADMIN: HCPCS | Performed by: PHYSICAL MEDICINE & REHABILITATION

## 2017-11-27 PROCEDURE — G8417 CALC BMI ABV UP PARAM F/U: HCPCS | Performed by: PHYSICAL MEDICINE & REHABILITATION

## 2017-11-27 NOTE — PROGRESS NOTES
New Patient: SPINE    Referring Provider:  Juana Spurling, CNP    CHIEF COMPLAINT:    Chief Complaint   Patient presents with    Neck Pain     pain x2 months, no injury, continues to worsen with cooking / ROM / sleeping at a 45 degree angle due to a heart condition    Arm Pain     bilat, down to both hands, also has numbness / tingling       HISTORY OF PRESENT ILLNESS:      · The patient is being sent at the request of Juana Spurling, CNP in consultation as a new spine patient for neck pain and bilateral arm pain. The patient is a 49 y.o. female whom reports she has had neck pain for the past 2 months. She has a  history of breast cancer treated 8 years ago. She was put on tamoxifen and developed swelling. She has had worsening swelling due to heart condition the past 2 months. She has to sleep in a recliner due to her inability to lay flat. She was put on oral prednisone and had worsening swelling. She underwent an MRI of the cervical spine 11/21/2017 and x-rays of the cervical spine on 10/17/2017. The visit today was undertaken with a  as well as with her . She does complain of some difficulty with fine motor skills.     Pain Assessment  Location of Pain: Neck  Location Modifiers: Posterior  Severity of Pain: 7  Quality of Pain:  (stabbing / increased headaches)  Duration of Pain: Persistent  Frequency of Pain: Intermittent  Aggravating Factors: Bending (ROM / laying down without neck support)  Limiting Behavior: Yes  Relieving Factors: Rest (pain medication)  Result of Injury: No  Work-Related Injury: No  Are there other pain locations you wish to document?: No      Associated signs and symptoms:   Neurogenic bowel or bladder symptoms:  no   Perceived weakness:  yes   Difficulty walking:  no    Recent Imaging (within past one year)   Xrays: yes   MRI or CT of spine: yes    Current/Past Treatment:   · Physical Therapy:  none  · Chiropractic:  none  · Injection: yes  · Medications:   NSAIDS:  yes   Muscle relaxer:  none   Steriods:  yes   Neuropathic medications:  cymbalta   Opioids:  oxycodone  · Previous surgery:  no  · Previous surgical consult:  yes                    Past Medical History:   Past Medical History:   Diagnosis Date    Anxiety     Arthritis     Asthma     Cancer ([facility] [address]     breast    Constipation     Depression     Diabetes mellitus ([facility]     Gastric ulcer, unspecified as acute or chronic, without mention of hemorrhage, perforation, or obstruction     HIGH CHOLESTEROL     Hypertension     Hypothyroidism     Thyroid disease     TIA (transient ischemic attack)     with occasional left leg and hand weakness      Past Surgical History:     Past Surgical History:   Procedure Laterality Date    BREAST SURGERY      left mastectomy    CARPAL TUNNEL RELEASE      Bilateral    HYSTERECTOMY      TONSILLECTOMY       Current Medications:     Current Outpatient Prescriptions:     gabapentin (NEURONTIN) 300 MG capsule, Take 1 capsule by mouth 3 times daily, Disp: 90 capsule, Rfl: 0    omega-3 acid ethyl esters (LOVAZA) 1 g capsule, , Disp: , Rfl:     predniSONE (DELTASONE) 10 MG tablet, 6 tabs day 1&2, 5 tabs day 3&4, 4 tabs day 5&6, 3 tabs day 7&8, 2 tabs day 9&10, 1 tab day 11&12, Disp: 42 tablet, Rfl: 0    methylPREDNISolone (MEDROL DOSEPACK) 4 MG tablet, Take by mouth., Disp: 1 kit, Rfl: 0    loratadine (CLARITIN) 10 MG tablet, Take 10 mg by mouth daily, Disp: , Rfl:     azithromycin (ZITHROMAX) 250 MG tablet, Take 500mg (two tablets) on the first day by mouth. Then take 250mg (one tablet) by mouth daily for 4 more days. Complete this medication regimen even if you feel better before it is done., Disp: 1 packet, Rfl: 0    fluticasone (FLONASE) 50 MCG/ACT nasal spray, 1 spray by Nasal route daily, Disp: 1 Bottle, Rfl: 0    oxyCODONE-acetaminophen (PERCOCET)  MG per tablet, Take 1 tablet by mouth every 4 hours as needed for Pain .  Earliest Fill Date: 6/8/17, Disp: 100 tablet, Rfl: 0    oxyCODONE (OXYCONTIN) 15 MG T12A extended release tablet, Take 1 tablet by mouth every 12 hours . Earliest Fill Date: 6/8/17, Disp: 60 tablet, Rfl: 0    tamoxifen (NOLVADEX) 20 MG tablet, Take 1 tablet by mouth daily, Disp: 90 tablet, Rfl: 6    Elastic Bandages & Supports (FUTURO SUPPORT GLOVE MEDIUM) MISC, 1 ready made support glove, Disp: 1 each, Rfl: 2    Compression Sleeve MISC, by Does not apply route 1 sleeve, 20-30 mmHg, Disp: 1 each, Rfl: 2    ALPRAZolam (XANAX) 1 MG tablet, Take 1 mg by mouth nightly as needed for Sleep, Disp: , Rfl:     TRAZODONE HCL ER PO, Take by mouth Indications: takes 20mg daily, Disp: , Rfl:     LYRICA 50 MG capsule, TAKE 1 CAPSULE BY MOUTH TWICE DAILY (Patient taking differently: Take 25mg in the Morning and 75 mg at night.), Disp: 60 capsule, Rfl: 0    vitamin D (ERGOCALCIFEROL) 59229 UNITS CAPS capsule, Take 1 capsule by mouth once a week., Disp: 12 capsule, Rfl: 1    diclofenac sodium (VOLTAREN) 1 % GEL, Apply 2 g topically 4 times daily as needed. , Disp: 100 g, Rfl: 4    Diclofenac Sodium (PENNSAID) 1.5 % SOLN, Place 10 drops onto the skin 2 times daily as needed for Pain., Disp: 2 Bottle, Rfl: 4    insulin lispro (HUMALOG) 100 UNIT/ML injection, Inject 30 Units into the skin 3 times daily (before meals). , Disp: , Rfl:     insulin glargine (LANTUS) 100 UNIT/ML injection, Inject 90 Units into the skin nightly.  , Disp: , Rfl:     levothyroxine (SYNTHROID) 150 MCG tablet, Take 75 mcg by mouth daily. , Disp: , Rfl:     montelukast (SINGULAIR) 10 MG tablet, Take 10 mg by mouth daily. , Disp: , Rfl:     duloxetine (CYMBALTA) 60 MG capsule, Take 60 mg by mouth 2 times daily. , Disp: , Rfl:     clonazepam (KLONOPIN) 0.5 MG tablet, Take 1 mg by mouth 2 times daily as needed. , Disp: , Rfl:     senna (SENOKOT) 8.6 MG tablet, Take 2 tablets by mouth daily.   , Disp: , Rfl:     lubiprostone (AMITIZA) 24 MCG capsule, Take 24 mcg by mouth 2 times daily (with meals). , Disp: , Rfl:     lisinopril (PRINIVIL;ZESTRIL) 10 MG tablet, Take 2.5 mg by mouth daily , Disp: , Rfl:     metoprolol (LOPRESSOR) 25 MG tablet, Take 50 mg by mouth 2 times daily , Disp: , Rfl:     simvastatin (ZOCOR) 40 MG tablet, Take 40 mg by mouth nightly.  , Disp: , Rfl:     furosemide (LASIX) 40 MG tablet, Take 40 mg by mouth 2 times daily. , Disp: , Rfl:   Allergies: Iv dye [iodides] and Trazodone and nefazodone  Social History:    reports that she has never smoked. She has never used smokeless tobacco. She reports that she does not drink alcohol or use drugs. Family History:   Family History   Problem Relation Age of Onset    Asthma Other     Cancer Other     Depression Other     Diabetes Other     Hypertension Other     High Cholesterol Other     Migraines Other          REVIEW OF SYSTEMS: Full ROS noted & scanned          PHYSICAL EXAM:    Vitals: Blood pressure 124/77, height 5' 4\" (1.626 m), weight 190 lb (86.2 kg), not currently breastfeeding. GENERAL EXAM:  · General Apparence: Patient is adequately groomed with no evidence of malnutrition. · Psychiatric: Orientation: The patient is oriented to time, place and person. The patient's mood and affect are appropriate   · Vascular: Examination reveals no swelling except in the entire left upper extremity due to lymphedema and palpation reveals no tenderness in upper or lower extremities. Good capillary refill. · The lymphatic examination of the neck, right axilla and groin reveals all areas to be without enlargement or induration. Lymph edema is noted in the left upper extremity   Sensation is intact without deficit in the upper and lower extremities to light touch and pinprick  · Coordination of the upper and lower extremities are normal.    CERVICAL EXAMINATION:  · Inspection: Local inspection shows no step-off or bruising.   Cervical alignment is normal. No instability is noted.  · Palpation and Percussion: No evidence of tenderness at the midline, and trapezius. Paraspinal tenderness is not present. There is no paraspinal spasm. · Range of Motion:  limited by 50% in all planes due to pain   · Strength: 5/5 bilateral upper extremities  · Skin:There are no rashes, ulcerations or lesions in right & left upper extremities. · Reflexes: Bilaterally triceps, biceps and brachioradialis are 1+. Clonus absent bilaterally at the feet. No pathological reflexes are noted. · Gait & station: normal, patient ambulates without assistance  · Additional Examinations:  · RIGHT UPPER EXTREMITY:  Inspection/examination of the right upper extremity does not show any tenderness, deformity or injury. Range of motion is normal and pain-free. There is no gross instability. There are no rashes, ulcerations or lesions. Strength and tone are normal. No atrophy or abnormal movements are noted. · LEFT UPPER EXTREMITY: Inspection/examination of the left upper extremity does not show any tenderness, deformity or injury. Range of motion is normal and pain-free. There is no gross instability. There are no rashes, ulcerations or lesions. Strength and tone are normal. No atrophy or abnormal movements are noted. Lymphedema is noted in the entire left arm    · Additional Examinations:  · RIGHT LOWER EXTREMITY: Inspection/examination of the right lower extremity does not show any tenderness, deformity or injury. Range of motion is unremarkable. There is no gross instability. There are no rashes, ulcerations or lesions. Strength and tone are normal. No atrophy or abnormal movements are noted. · LEFT LOWER EXTREMITY:  Inspection/examination of the left lower extremity does not show any tenderness, deformity or injury. Range of motion is unremarkable. There is no gross instability. There are no rashes, ulcerations or lesions. Strength and tone are normal. No atrophy or abnormal movements are noted.       Diagnostic Testing:    Xrays:   I personally reviewed images of the AP lateral views of the cervical spine from 10/17/2017 showing C4 and C5 anterolisthesis with mild C5 6 6 7 degenerative disc disease  MRI or CT:  MR Cervical spine 11/20/17  1. Degenerative changes of the cervical spine contribute to minimal spinal   canal stenosis at C5-C6.  No spinal canal stenosis at the remaining levels. 2. Degenerative changes to contribute to multilevel neural foraminal   narrowing as above. 3. Mild straightening of the normal cervical lordosis without evidence of   spondylolisthesis. EMG:  None  Results for orders placed or performed during the hospital encounter of 11/08/17   Ejection Fraction Percentage   Result Value Ref Range    Left Ventricular Ejection Fraction 60 %    LEFT VENTRICULAR EJECTION FRACTION MODE Echo     Left Ventricular Ejection Fraction High Value 60 %       Impression (Medical Decision Making):       1. Osteoarthritis of spine with radiculopathy, cervical region    2. Cervical spinal stenosis    3. DDD (degenerative disc disease), cervical        Plan (Medical Decision Making):    1. Medications:  Continue current regimen. 2. PT:  Encouraged to continue with HEP. 3. Further studies:  None further  4. Interventional:  We discussed pursuing a C6/7 IL epidural steroid injection to address the pain. Radiologic imaging and symptoms confirm the pain etiology. Risks, benefits and alternatives of interventional options were discussed. These include and are not limited to bleeding, infection, spinal headache, nerve injury, increased pain and lack of pain relief. The patient verbalized understanding and would like to proceed. The patient will be scheduled accordingly.   5. Healthy Lifestyle Measures:  Patient education material - Anatomic drawings, healthy lifestyle education, osteoporosis prevention, back and neck pain educational information, and advanced imaging preparedness were distributed to the

## 2017-11-30 ENCOUNTER — TELEPHONE (OUTPATIENT)
Dept: CARDIOLOGY CLINIC | Age: 47
End: 2017-11-30

## 2017-11-30 NOTE — TELEPHONE ENCOUNTER
Form from [employer] re dental procedures filled out and signed by tati.   Faxed to [phone] with copy of office note; fax conf # [phone] @ [phone]

## 2017-12-05 ENCOUNTER — TELEPHONE (OUTPATIENT)
Dept: ORTHOPEDIC SURGERY | Age: 47
End: 2017-12-05

## 2017-12-11 ENCOUNTER — TELEPHONE (OUTPATIENT)
Dept: CARDIOLOGY CLINIC | Age: 47
End: 2017-12-11

## 2017-12-11 NOTE — TELEPHONE ENCOUNTER
Spoke with pt's , pt's bp has been running around 140/95 and 130/85 also. Pt c/o dizziness. Recently had a sinus infection was started on some antibiotics and a muscle relaxer when she noticed her BP was getting high.  1 [facility] Jomar BLUM please advise

## 2017-12-20 ENCOUNTER — HOSPITAL ENCOUNTER (OUTPATIENT)
Dept: PAIN MANAGEMENT | Age: 47
Discharge: HOME | End: 2017-12-20
Attending: PHYSICAL MEDICINE & REHABILITATION | Admitting: PHYSICAL MEDICINE & REHABILITATION

## 2017-12-20 VITALS
BODY MASS INDEX: 32.44 KG/M2 | HEART RATE: 66 BPM | DIASTOLIC BLOOD PRESSURE: 69 MMHG | OXYGEN SATURATION: 97 % | WEIGHT: 190 LBS | HEIGHT: 64 IN | SYSTOLIC BLOOD PRESSURE: 105 MMHG | RESPIRATION RATE: 16 BRPM

## 2017-12-20 LAB
GLUCOSE BLD-MCNC: 59 MG/DL (ref 70–99)
GLUCOSE BLD-MCNC: 88 MG/DL (ref 70–99)
PERFORMED ON: ABNORMAL
PERFORMED ON: NORMAL

## 2017-12-20 ASSESSMENT — PAIN DESCRIPTION - DESCRIPTORS: DESCRIPTORS: TINGLING

## 2017-12-20 ASSESSMENT — PAIN - FUNCTIONAL ASSESSMENT
PAIN_FUNCTIONAL_ASSESSMENT: 0-10
PAIN_FUNCTIONAL_ASSESSMENT: 0-10

## 2017-12-20 NOTE — PROGRESS NOTES
CERVICAL INJECTION       Pain Therapy Intra- Procedure Record    Sight marked/ confirmed : Yes   Position: Prone    Prep: chloraprep      Local: lidocaine 1%   Dexamethasone:               Sodium Chloride 9%:         Monitoring: ANA LILIA DAILY RN              C arm : UNRULY Henry RT   Circulator: Asael Abbott RN               Prepped by: Michael Vu MD

## 2017-12-20 NOTE — OP NOTE
Patient:  Ashli Fabian  YOB: 1970  Medical Record #:  [id number]   Place: [address] [place] [address]  Date:  12/20/2017   Physician:  Giuliano Sexton MD    Procedure:  Cervical Epidural Interlaminar Steroid Injection  C6 - C7    Pre-Procedure Diagnosis: Cervical stenosis, Cervical DDD    Post-Procedure Diagnosis: Same    Sedation: Local with 1% Lidocaine 3 ml and 2 mg of IV Versed    EBL: None    Complications: None    Procedure Summary:    The patient was seen in the office for complaints of neck pain. Review of the imaging and physical exam of the patient confirmed the pre-procedure diagnosis. After a thorough discussion of risks, benefits and alternatives informed consent was obtained. The patient was brought to the procedure suite and placed in the prone position. The skin overlying the cervical spine was prepped and draped in the usual sterile fashion. Using fluoroscopic guidance, the C6 - C7 interlaminar space was identified. Through anesthetized skin, a 22 gauge Touhy needle was advanced into the epidural space using continuous loss of resistance to saline technique. Prohance was instilled and an epidurogram was noted without evidence of vascular or intrathecal spread. Following which, 5 ml of a solution containing preservative free normal saline and 10 mg of PF dexamethasone was instilled. The needle was removed and a band-aid applied. The patient was transferred to the post-operative suite in stable condition.

## 2018-01-10 ENCOUNTER — TELEPHONE (OUTPATIENT)
Dept: CARDIOLOGY CLINIC | Age: 48
End: 2018-01-10

## 2018-01-10 ENCOUNTER — OFFICE VISIT (OUTPATIENT)
Dept: PULMONOLOGY | Age: 48
End: 2018-01-10

## 2018-01-10 ENCOUNTER — HOSPITAL ENCOUNTER (OUTPATIENT)
Dept: OTHER | Age: 48
Discharge: HOME | End: 2018-01-10
Attending: INTERNAL MEDICINE | Admitting: INTERNAL MEDICINE

## 2018-01-10 VITALS
RESPIRATION RATE: 24 BRPM | DIASTOLIC BLOOD PRESSURE: 79 MMHG | SYSTOLIC BLOOD PRESSURE: 124 MMHG | HEART RATE: 84 BPM | HEIGHT: 64 IN | WEIGHT: 195 LBS | BODY MASS INDEX: 33.29 KG/M2 | OXYGEN SATURATION: 95 %

## 2018-01-10 DIAGNOSIS — J98.4 RESTRICTIVE LUNG DISEASE: ICD-10-CM

## 2018-01-10 DIAGNOSIS — K21.9 GASTROESOPHAGEAL REFLUX DISEASE, ESOPHAGITIS PRESENCE NOT SPECIFIED: ICD-10-CM

## 2018-01-10 DIAGNOSIS — R06.02 SOB (SHORTNESS OF BREATH): Primary | ICD-10-CM

## 2018-01-10 DIAGNOSIS — J45.30 MILD PERSISTENT ASTHMA WITHOUT COMPLICATION: ICD-10-CM

## 2018-01-10 DIAGNOSIS — R06.02 SOB (SHORTNESS OF BREATH): ICD-10-CM

## 2018-01-10 LAB
ALBUMIN SERPL-MCNC: 4.1 G/DL (ref 3.4–5)
ALP BLD-CCNC: 54 U/L (ref 40–129)
ALT SERPL-CCNC: 14 U/L (ref 10–40)
ANION GAP SERPL CALCULATED.3IONS-SCNC: 14 MMOL/L (ref 3–16)
AST SERPL-CCNC: 18 U/L (ref 15–37)
BASOPHILS ABSOLUTE: 0.1 K/UL (ref 0–0.2)
BASOPHILS RELATIVE PERCENT: 1.3 %
BILIRUB SERPL-MCNC: <0.2 MG/DL (ref 0–1)
BILIRUBIN DIRECT: <0.2 MG/DL (ref 0–0.3)
BILIRUBIN, INDIRECT: NORMAL MG/DL (ref 0–1)
BUN BLDV-MCNC: 37 MG/DL (ref 7–20)
CALCIUM SERPL-MCNC: 9.3 MG/DL (ref 8.3–10.6)
CHLORIDE BLD-SCNC: 94 MMOL/L (ref 99–110)
CO2: 26 MMOL/L (ref 21–32)
CREAT SERPL-MCNC: 0.9 MG/DL (ref 0.6–1.1)
EOSINOPHILS ABSOLUTE: 0.2 K/UL (ref 0–0.6)
EOSINOPHILS RELATIVE PERCENT: 2.1 %
GFR AFRICAN AMERICAN: >60
GFR NON-AFRICAN AMERICAN: >60
GLUCOSE BLD-MCNC: 155 MG/DL (ref 70–99)
HCT VFR BLD CALC: 36.3 % (ref 36–48)
HEMOGLOBIN: 11.7 G/DL (ref 12–16)
LYMPHOCYTES ABSOLUTE: 3.2 K/UL (ref 1–5.1)
LYMPHOCYTES RELATIVE PERCENT: 39.6 %
MCH RBC QN AUTO: 29.3 PG (ref 26–34)
MCHC RBC AUTO-ENTMCNC: 32.2 G/DL (ref 31–36)
MCV RBC AUTO: 91.2 FL (ref 80–100)
MONOCYTES ABSOLUTE: 0.9 K/UL (ref 0–1.3)
MONOCYTES RELATIVE PERCENT: 10.6 %
NEUTROPHILS ABSOLUTE: 3.8 K/UL (ref 1.7–7.7)
NEUTROPHILS RELATIVE PERCENT: 46.4 %
PDW BLD-RTO: 13.3 % (ref 12.4–15.4)
PHOSPHORUS: 2.1 MG/DL (ref 2.5–4.9)
PLATELET # BLD: 253 K/UL (ref 135–450)
PMV BLD AUTO: 10.9 FL (ref 5–10.5)
POTASSIUM SERPL-SCNC: 4.4 MMOL/L (ref 3.5–5.1)
RBC # BLD: 3.98 M/UL (ref 4–5.2)
SODIUM BLD-SCNC: 134 MMOL/L (ref 136–145)
TOTAL PROTEIN: 7.3 G/DL (ref 6.4–8.2)
TSH SERPL DL<=0.05 MIU/L-ACNC: 1.95 UIU/ML (ref 0.27–4.2)
WBC # BLD: 8.2 K/UL (ref 4–11)

## 2018-01-10 PROCEDURE — G8484 FLU IMMUNIZE NO ADMIN: HCPCS | Performed by: INTERNAL MEDICINE

## 2018-01-10 PROCEDURE — G8417 CALC BMI ABV UP PARAM F/U: HCPCS | Performed by: INTERNAL MEDICINE

## 2018-01-10 PROCEDURE — 99244 OFF/OP CNSLTJ NEW/EST MOD 40: CPT | Performed by: INTERNAL MEDICINE

## 2018-01-10 PROCEDURE — G8427 DOCREV CUR MEDS BY ELIG CLIN: HCPCS | Performed by: INTERNAL MEDICINE

## 2018-01-10 RX ORDER — ALBUTEROL SULFATE 90 UG/1
2 AEROSOL, METERED RESPIRATORY (INHALATION) EVERY 6 HOURS PRN
COMMUNITY
End: 2018-04-16 | Stop reason: SDUPTHER

## 2018-01-10 RX ORDER — ALBUTEROL SULFATE 1.25 MG/3ML
1 SOLUTION RESPIRATORY (INHALATION) EVERY 6 HOURS PRN
COMMUNITY
End: 2020-03-25 | Stop reason: ALTCHOICE

## 2018-01-10 NOTE — TELEPHONE ENCOUNTER
Spoke w/ MMK. Per MMK if symptoms are really what her  is describing she should be evaluated in the ER. I spoke w/ pt's  and he agrees to take her to [facility] ER to be evaluated.

## 2018-01-10 NOTE — TELEPHONE ENCOUNTER
Spoke w/ pt's . He states she is \"full of water\". He states her arms, legs, and abdomen are edematous and she has PND. She is sleeping at 45 degree angle to breath, states if flat can hear her lungs \"crunching\". [facility] Patient's  would like your opinion on whether she should go to ER.

## 2018-01-10 NOTE — COMMUNICATION BODY
Assessment:     Assessment:    1. SOB (shortness of breath)     2. Restrictive lung disease     3. Mild persistent asthma without complication     4.  Gastroesophageal reflux disease, esophagitis presence not specified             Plan:     Plan:       · I discussed with patient and her  the above  · I reviewed her previous workup by pulmonary medicine at [facility]  · She has been appropriately treated for possible asthma although her last PFT showed mainly restrictive pattern  · Her last CAT scan from November 2017 showed some mild scarring or fibrosis due to previous radiation of her left breast cancer  · I will order labs and chest x-ray today  · She is allergic to dye and I will order a VQ scan for further evaluation as well  · Educated about GERD related respiratory complications and she might benefit from an EGD due to some dysphagia complaints  · Assessment with exertion and at night  · Return to see in 1 month

## 2018-01-10 NOTE — TELEPHONE ENCOUNTER
Calling to adv that pt has gained a lot of water weight, pt had gained 20 lbs since last office visit, pt is having SOB. Pt spoke with PCP and they adv to call us.  Please call to adv thank you

## 2018-01-11 PROBLEM — I50.33 ACUTE ON CHRONIC DIASTOLIC CONGESTIVE HEART FAILURE (HCC): Status: ACTIVE | Noted: 2018-01-11

## 2018-01-12 PROBLEM — E78.5 HLD (HYPERLIPIDEMIA): Status: ACTIVE | Noted: 2017-11-08

## 2018-01-12 PROBLEM — F32.A ANXIETY AND DEPRESSION: Status: ACTIVE | Noted: 2018-01-12

## 2018-01-12 PROBLEM — F41.9 ANXIETY AND DEPRESSION: Status: ACTIVE | Noted: 2018-01-12

## 2018-01-12 PROBLEM — E11.9 DM2 (DIABETES MELLITUS, TYPE 2) (HCC): Status: ACTIVE | Noted: 2018-01-12

## 2018-01-12 PROBLEM — J45.909 ASTHMA: Status: ACTIVE | Noted: 2018-01-12

## 2018-01-12 PROBLEM — I10 HTN (HYPERTENSION): Status: ACTIVE | Noted: 2018-01-12

## 2018-01-12 PROBLEM — E03.9 HYPOTHYROIDISM: Status: ACTIVE | Noted: 2018-01-12

## 2018-01-12 PROBLEM — Z85.3 HX OF BREAST CANCER: Status: ACTIVE | Noted: 2018-01-12

## 2018-01-22 ENCOUNTER — TELEPHONE (OUTPATIENT)
Dept: CARDIOLOGY CLINIC | Age: 48
End: 2018-01-22

## 2018-01-22 RX ORDER — LISINOPRIL 10 MG/1
5 TABLET ORAL DAILY
Qty: 30 TABLET | Refills: 0 | Status: SHIPPED | OUTPATIENT
Start: 2018-01-22 | End: 2018-01-24 | Stop reason: SDUPTHER

## 2018-01-22 RX ORDER — SIMVASTATIN 20 MG
20 TABLET ORAL NIGHTLY
Qty: 30 TABLET | Refills: 1 | Status: SHIPPED | OUTPATIENT
Start: 2018-01-22 | End: 2018-01-24 | Stop reason: SDUPTHER

## 2018-01-22 RX ORDER — FUROSEMIDE 40 MG/1
40 TABLET ORAL 2 TIMES DAILY
Qty: 60 TABLET | Refills: 1 | Status: SHIPPED | OUTPATIENT
Start: 2018-01-22 | End: 2018-01-24 | Stop reason: SDUPTHER

## 2018-01-22 NOTE — TELEPHONE ENCOUNTER
Calling to find out if ARETHA will sign home health care orders and also to adv that pt had a 9 lb weight gain.  Please call to adv thank you

## 2018-01-23 ENCOUNTER — HOSPITAL ENCOUNTER (OUTPATIENT)
Dept: OTHER | Age: 48
Discharge: HOME | End: 2018-01-31
Attending: INTERNAL MEDICINE | Admitting: INTERNAL MEDICINE

## 2018-01-23 LAB
ANION GAP SERPL CALCULATED.3IONS-SCNC: 15 MMOL/L (ref 3–16)
BUN BLDV-MCNC: 32 MG/DL (ref 7–20)
CALCIUM SERPL-MCNC: 9.7 MG/DL (ref 8.3–10.6)
CHLORIDE BLD-SCNC: 98 MMOL/L (ref 99–110)
CO2: 25 MMOL/L (ref 21–32)
CREAT SERPL-MCNC: 0.6 MG/DL (ref 0.6–1.1)
GFR AFRICAN AMERICAN: >60
GFR NON-AFRICAN AMERICAN: >60
GLUCOSE BLD-MCNC: 94 MG/DL (ref 70–99)
POTASSIUM SERPL-SCNC: 4.1 MMOL/L (ref 3.5–5.1)
PRO-BNP: 365 PG/ML (ref 0–124)
SODIUM BLD-SCNC: 138 MMOL/L (ref 136–145)

## 2018-01-24 ENCOUNTER — OFFICE VISIT (OUTPATIENT)
Dept: CARDIOLOGY CLINIC | Age: 48
End: 2018-01-24

## 2018-01-24 VITALS
BODY MASS INDEX: 32.2 KG/M2 | DIASTOLIC BLOOD PRESSURE: 84 MMHG | OXYGEN SATURATION: 94 % | WEIGHT: 188.6 LBS | HEART RATE: 76 BPM | HEIGHT: 64 IN | SYSTOLIC BLOOD PRESSURE: 124 MMHG

## 2018-01-24 DIAGNOSIS — I10 ESSENTIAL HYPERTENSION: ICD-10-CM

## 2018-01-24 DIAGNOSIS — I25.10 CORONARY ARTERY DISEASE INVOLVING NATIVE CORONARY ARTERY OF NATIVE HEART WITHOUT ANGINA PECTORIS: ICD-10-CM

## 2018-01-24 DIAGNOSIS — I50.30 (HFPEF) HEART FAILURE WITH PRESERVED EJECTION FRACTION (HCC): Primary | ICD-10-CM

## 2018-01-24 PROCEDURE — G8598 ASA/ANTIPLAT THER USED: HCPCS | Performed by: NURSE PRACTITIONER

## 2018-01-24 PROCEDURE — 1036F TOBACCO NON-USER: CPT | Performed by: NURSE PRACTITIONER

## 2018-01-24 PROCEDURE — G8484 FLU IMMUNIZE NO ADMIN: HCPCS | Performed by: NURSE PRACTITIONER

## 2018-01-24 PROCEDURE — G8417 CALC BMI ABV UP PARAM F/U: HCPCS | Performed by: NURSE PRACTITIONER

## 2018-01-24 PROCEDURE — 1111F DSCHRG MED/CURRENT MED MERGE: CPT | Performed by: NURSE PRACTITIONER

## 2018-01-24 PROCEDURE — 99214 OFFICE O/P EST MOD 30 MIN: CPT | Performed by: NURSE PRACTITIONER

## 2018-01-24 PROCEDURE — G8427 DOCREV CUR MEDS BY ELIG CLIN: HCPCS | Performed by: NURSE PRACTITIONER

## 2018-01-24 RX ORDER — FUROSEMIDE 40 MG/1
TABLET ORAL
Qty: 270 TABLET | Refills: 1 | Status: SHIPPED | OUTPATIENT
Start: 2018-01-24 | End: 2018-02-05 | Stop reason: SDUPTHER

## 2018-01-24 RX ORDER — SIMVASTATIN 20 MG
20 TABLET ORAL NIGHTLY
Qty: 90 TABLET | Refills: 3 | Status: SHIPPED | OUTPATIENT
Start: 2018-01-24 | End: 2018-09-06 | Stop reason: SDUPTHER

## 2018-01-24 RX ORDER — LISINOPRIL 2.5 MG/1
2.5 TABLET ORAL DAILY
Qty: 90 TABLET | Refills: 1 | Status: SHIPPED | OUTPATIENT
Start: 2018-01-24 | End: 2018-03-01 | Stop reason: SDUPTHER

## 2018-01-24 NOTE — PATIENT INSTRUCTIONS
1. Increase Lasix (furosemide) to 80 mg in am and 40 mg in pm. Okay to take 60 mg in am and 60 mg in pm.    2. Six hours between Lasix doses. 3. Other medications unchanged. 4. Home health to draw labs in one week to monitor kidney function. 5. Follow up in 2 weeks, earlier for worsening.

## 2018-01-25 ENCOUNTER — TELEPHONE (OUTPATIENT)
Dept: CARDIOLOGY CLINIC | Age: 48
End: 2018-01-25

## 2018-01-26 NOTE — TELEPHONE ENCOUNTER
I spoke with Diane Maxwell and relayed message per ARETHA. She verbalized understanding and said she would get to work on that.

## 2018-01-29 ENCOUNTER — TELEPHONE (OUTPATIENT)
Dept: OTHER | Age: 48
End: 2018-01-29

## 2018-01-29 ENCOUNTER — TELEPHONE (OUTPATIENT)
Dept: CARDIOLOGY CLINIC | Age: 48
End: 2018-01-29

## 2018-01-29 LAB — DIABETIC RETINOPATHY: NORMAL

## 2018-01-29 NOTE — TELEPHONE ENCOUNTER
[facility] FAILURE PROGRAM  TELEPHONE ENCOUNTER FORM    Crow Sharma 1970    ASSESSMENT:   1. Baseline weight:  182 lbs  2. Current weight: 189 lbs  3. Symptoms: edema, weight gain. Patient has weight gain and swelling  4. Diet history: following a low sodium diet Yes  5. Medication history: taking medications as instructed Yes; medication side effects noted No  6. Tobacco history: never  7. Activity history: normal activities of daily living  8. Have you made a lifestyle change since being home? Yes low sodium diet     RECOMMENDATIONS:   1. Medication:none. Adjusting medications with Sagar Moeller N.P.   2. Diet: low sodium   3. MD/ Clinic appointment: 1/24/18 with Sagar Moeller N.P.   4. Other: Patient has swelling and weight gain and productive cough. Will discuss with Sagar Moeller about weight gain. Patient wanting a female family doctor. Will call to help facilitate.            Araceli FOSTER 1/29/2018 1:33 PM

## 2018-01-29 NOTE — TELEPHONE ENCOUNTER
I spoke with Char and relayed message per RGNP. She verbalized understanding and stated that she would call pt and have her f/u with Pulmonolgy.

## 2018-01-30 LAB
ANION GAP SERPL CALCULATED.3IONS-SCNC: 13 MMOL/L (ref 3–16)
BUN BLDV-MCNC: 31 MG/DL (ref 7–20)
CALCIUM SERPL-MCNC: 10.1 MG/DL (ref 8.3–10.6)
CHLORIDE BLD-SCNC: 99 MMOL/L (ref 99–110)
CO2: 30 MMOL/L (ref 21–32)
CREAT SERPL-MCNC: 0.9 MG/DL (ref 0.6–1.1)
GFR AFRICAN AMERICAN: >60
GFR NON-AFRICAN AMERICAN: >60
GLUCOSE BLD-MCNC: 101 MG/DL (ref 70–99)
POTASSIUM SERPL-SCNC: 4.3 MMOL/L (ref 3.5–5.1)
PRO-BNP: 116 PG/ML (ref 0–124)
SODIUM BLD-SCNC: 142 MMOL/L (ref 136–145)

## 2018-01-31 ENCOUNTER — OFFICE VISIT (OUTPATIENT)
Dept: ENDOCRINOLOGY | Age: 48
End: 2018-01-31

## 2018-01-31 VITALS
HEIGHT: 64 IN | SYSTOLIC BLOOD PRESSURE: 121 MMHG | DIASTOLIC BLOOD PRESSURE: 78 MMHG | HEART RATE: 86 BPM | WEIGHT: 193 LBS | BODY MASS INDEX: 32.95 KG/M2

## 2018-01-31 DIAGNOSIS — E11.9 TYPE 2 DIABETES MELLITUS WITHOUT COMPLICATION, WITHOUT LONG-TERM CURRENT USE OF INSULIN (HCC): Primary | ICD-10-CM

## 2018-01-31 DIAGNOSIS — E11.42 DIABETIC POLYNEUROPATHY ASSOCIATED WITH TYPE 2 DIABETES MELLITUS (HCC): ICD-10-CM

## 2018-01-31 DIAGNOSIS — E78.2 MIXED HYPERLIPIDEMIA: ICD-10-CM

## 2018-01-31 DIAGNOSIS — J45.50 SEVERE PERSISTENT ASTHMA WITHOUT COMPLICATION: ICD-10-CM

## 2018-01-31 DIAGNOSIS — I25.10 CORONARY ARTERY DISEASE INVOLVING NATIVE CORONARY ARTERY OF NATIVE HEART WITHOUT ANGINA PECTORIS: ICD-10-CM

## 2018-01-31 DIAGNOSIS — F34.1 DYSTHYMIA: ICD-10-CM

## 2018-01-31 DIAGNOSIS — I89.0 LYMPHEDEMA OF UPPER EXTREMITY FOLLOWING LYMPHADENECTOMY: ICD-10-CM

## 2018-01-31 DIAGNOSIS — Z85.3 HX OF BREAST CANCER: ICD-10-CM

## 2018-01-31 DIAGNOSIS — M79.7 PRIMARY FIBROMYALGIA: ICD-10-CM

## 2018-01-31 DIAGNOSIS — E03.0 CONGENITAL HYPOTHYROIDISM WITH DIFFUSE GOITER: ICD-10-CM

## 2018-01-31 DIAGNOSIS — C50.812 MALIGNANT NEOPLASM OF OVERLAPPING SITES OF LEFT FEMALE BREAST, UNSPECIFIED ESTROGEN RECEPTOR STATUS (HCC): ICD-10-CM

## 2018-01-31 DIAGNOSIS — I10 ESSENTIAL HYPERTENSION: ICD-10-CM

## 2018-01-31 DIAGNOSIS — E89.89 LYMPHEDEMA OF UPPER EXTREMITY FOLLOWING LYMPHADENECTOMY: ICD-10-CM

## 2018-01-31 PROCEDURE — 96160 PT-FOCUSED HLTH RISK ASSMT: CPT | Performed by: INTERNAL MEDICINE

## 2018-01-31 PROCEDURE — 99215 OFFICE O/P EST HI 40 MIN: CPT | Performed by: INTERNAL MEDICINE

## 2018-01-31 ASSESSMENT — PATIENT HEALTH QUESTIONNAIRE - PHQ9
6. FEELING BAD ABOUT YOURSELF - OR THAT YOU ARE A FAILURE OR HAVE LET YOURSELF OR YOUR FAMILY DOWN: 3
4. FEELING TIRED OR HAVING LITTLE ENERGY: 3
7. TROUBLE CONCENTRATING ON THINGS, SUCH AS READING THE NEWSPAPER OR WATCHING TELEVISION: 3
5. POOR APPETITE OR OVEREATING: 3
SUM OF ALL RESPONSES TO PHQ9 QUESTIONS 1 & 2: 6
1. LITTLE INTEREST OR PLEASURE IN DOING THINGS: 3
8. MOVING OR SPEAKING SO SLOWLY THAT OTHER PEOPLE COULD HAVE NOTICED. OR THE OPPOSITE, BEING SO FIGETY OR RESTLESS THAT YOU HAVE BEEN MOVING AROUND A LOT MORE THAN USUAL: 2
3. TROUBLE FALLING OR STAYING ASLEEP: 1
SUM OF ALL RESPONSES TO PHQ QUESTIONS 1-9: 22
10. IF YOU CHECKED OFF ANY PROBLEMS, HOW DIFFICULT HAVE THESE PROBLEMS MADE IT FOR YOU TO DO YOUR WORK, TAKE CARE OF THINGS AT HOME, OR GET ALONG WITH OTHER PEOPLE: 2
9. THOUGHTS THAT YOU WOULD BE BETTER OFF DEAD, OR OF HURTING YOURSELF: 1
2. FEELING DOWN, DEPRESSED OR HOPELESS: 3

## 2018-02-01 ENCOUNTER — TELEPHONE (OUTPATIENT)
Dept: OTHER | Age: 48
End: 2018-02-01

## 2018-02-01 ENCOUNTER — TELEPHONE (OUTPATIENT)
Dept: CARDIOLOGY CLINIC | Age: 48
End: 2018-02-01

## 2018-02-01 ENCOUNTER — HOSPITAL ENCOUNTER (OUTPATIENT)
Dept: OTHER | Age: 48
Discharge: HOME | End: 2018-02-28
Attending: INTERNAL MEDICINE | Admitting: INTERNAL MEDICINE

## 2018-02-01 NOTE — TELEPHONE ENCOUNTER
PT  calling to see if Kyler Frederick HF RN or [facility] got his VM msg from earlier about pts weight being 95.5 lbs?  Pls call to advise Thank you

## 2018-02-02 ENCOUNTER — TELEPHONE (OUTPATIENT)
Dept: CARDIOLOGY | Age: 48
End: 2018-02-02

## 2018-02-02 PROBLEM — F34.1 DYSTHYMIA: Status: ACTIVE | Noted: 2018-02-02

## 2018-02-02 PROBLEM — E11.42 DIABETIC POLYNEUROPATHY ASSOCIATED WITH TYPE 2 DIABETES MELLITUS (HCC): Status: ACTIVE | Noted: 2018-02-02

## 2018-02-02 PROBLEM — J45.50 SEVERE PERSISTENT ASTHMA WITHOUT COMPLICATION: Status: ACTIVE | Noted: 2018-02-02

## 2018-02-02 RX ORDER — METOLAZONE 2.5 MG/1
2.5 TABLET ORAL DAILY
Qty: 1 TABLET | Refills: 0 | Status: ON HOLD | OUTPATIENT
Start: 2018-02-02 | End: 2018-03-25 | Stop reason: HOSPADM

## 2018-02-05 ENCOUNTER — OFFICE VISIT (OUTPATIENT)
Dept: ORTHOPEDIC SURGERY | Age: 48
End: 2018-02-05

## 2018-02-05 VITALS
BODY MASS INDEX: 32.93 KG/M2 | SYSTOLIC BLOOD PRESSURE: 124 MMHG | HEIGHT: 64 IN | WEIGHT: 192.9 LBS | DIASTOLIC BLOOD PRESSURE: 77 MMHG

## 2018-02-05 DIAGNOSIS — M51.36 DDD (DEGENERATIVE DISC DISEASE), LUMBAR: Primary | ICD-10-CM

## 2018-02-05 DIAGNOSIS — M54.16 LUMBAR RADICULOPATHY: ICD-10-CM

## 2018-02-05 DIAGNOSIS — M47.816 SPONDYLOSIS OF LUMBAR REGION WITHOUT MYELOPATHY OR RADICULOPATHY: ICD-10-CM

## 2018-02-05 DIAGNOSIS — Z85.3 HISTORY OF BREAST CANCER: ICD-10-CM

## 2018-02-05 PROCEDURE — 1036F TOBACCO NON-USER: CPT | Performed by: PHYSICAL MEDICINE & REHABILITATION

## 2018-02-05 PROCEDURE — G8417 CALC BMI ABV UP PARAM F/U: HCPCS | Performed by: PHYSICAL MEDICINE & REHABILITATION

## 2018-02-05 PROCEDURE — G8598 ASA/ANTIPLAT THER USED: HCPCS | Performed by: PHYSICAL MEDICINE & REHABILITATION

## 2018-02-05 PROCEDURE — G8427 DOCREV CUR MEDS BY ELIG CLIN: HCPCS | Performed by: PHYSICAL MEDICINE & REHABILITATION

## 2018-02-05 PROCEDURE — G8484 FLU IMMUNIZE NO ADMIN: HCPCS | Performed by: PHYSICAL MEDICINE & REHABILITATION

## 2018-02-05 PROCEDURE — 99214 OFFICE O/P EST MOD 30 MIN: CPT | Performed by: PHYSICAL MEDICINE & REHABILITATION

## 2018-02-05 PROCEDURE — 1111F DSCHRG MED/CURRENT MED MERGE: CPT | Performed by: PHYSICAL MEDICINE & REHABILITATION

## 2018-02-05 RX ORDER — FUROSEMIDE 40 MG/1
TABLET ORAL
Qty: 270 TABLET | Refills: 1
Start: 2018-02-05 | End: 2018-02-13 | Stop reason: ALTCHOICE

## 2018-02-05 NOTE — TELEPHONE ENCOUNTER
I spoke with pt  , he states pt taking 80 mg AM 40mg in afternoon and at night. He stated that pt weight is 200 lb. He verbalized understanding about the new medication and stated they would comply. I spoke with Devan Rousseau at [facility] to verify that labs will be done 2/6. Also to advise of med changes.

## 2018-02-05 NOTE — PROGRESS NOTES
Follow up: SPINE      CHIEF COMPLAINT:    Chief Complaint   Patient presents with    Neck Pain     F/u [facility]SP. Pt never followed up from [facility] [place] 12/20. States she was in hospital and unable to come to appointment. Notes overall neck pain is improved.  Lower Back Pain     new onset low back pain       HISTORY OF PRESENT ILLNESS:        The patient is a 49 y.o. female whom reports she underwent a C6 7 interlaminar approach epidural steroid injection on 12/20/2017. She has had a complete relief of her neck  Pain. She complains today of low back pain which started after she developed congestive heart failure. She thinks that the increase in weight has been low back pain. She has completed the paresthesia into lower extremities. She does have a history of breast cancer. She is lymphedema in the entire left arm. She complains of paresthesia into lower extremities. The HPI is obtained through an [employer] .       Pain Assessment  Location of Pain: Back  Location Modifiers: Posterior  Severity of Pain: 4  Quality of Pain: Aching, Dull  Duration of Pain: Persistent  Frequency of Pain: Intermittent  Aggravating Factors: Standing, Walking  Limiting Behavior: Yes  Relieving Factors: Rest  Result of Injury: No  Work-Related Injury: No  Are there other pain locations you wish to document?: No    Associated signs and symptoms:   Neurogenic bowel or bladder symptoms:  no   Perceived weakness:  no   Difficulty walking:  yes              Past Medical History:   Past Medical History:   Diagnosis Date    Anxiety     Arthritis     Asthma     Cancer ([facility])     breast    Constipation     COPD (chronic obstructive pulmonary disease) ([facility])     Depression     Diabetes mellitus ([facility]     Diabetic polyneuropathy associated with type 2 diabetes mellitus ([facility] 2/2/2018    Dysthymia 2/2/2018    Gastric ulcer, unspecified as acute or chronic, without mention of hemorrhage, perforation, or obstruction     HIGH mouth Daily, Disp: , Rfl:     cyclobenzaprine (FLEXERIL) 10 MG tablet, Take 10 mg by mouth 3 times daily as needed for Muscle spasms, Disp: , Rfl:     Cholecalciferol (VITAMIN D) 2000 units CAPS capsule, Take 1 capsule by mouth daily (with breakfast), Disp: , Rfl:     glimepiride (AMARYL) 4 MG tablet, Take 4 mg by mouth every morning (before breakfast), Disp: , Rfl:     hydroxychloroquine (PLAQUENIL) 200 MG tablet, Take 200 mg by mouth 2 times daily, Disp: , Rfl:     ferrous sulfate 325 (65 Fe) MG tablet, Take 325 mg by mouth 2 times daily (with meals), Disp: , Rfl:     sennosides-docusate sodium (SENOKOT-S) 8.6-50 MG tablet, Take 1 tablet by mouth daily, Disp: , Rfl:     folic acid (FOLVITE) 1 MG tablet, Take 1 mg by mouth daily, Disp: , Rfl:     lurasidone (LATUDA) 40 MG TABS tablet, Take 40 mg by mouth Daily with supper, Disp: , Rfl:     Omega-3 1000 MG CAPS, Take 2,000 mg by mouth 2 times daily (with meals), Disp: , Rfl:     leflunomide (ARAVA) 20 MG tablet, Take 20 mg by mouth daily, Disp: , Rfl:     albuterol (ACCUNEB) 1.25 MG/3ML nebulizer solution, Inhale 1 ampule into the lungs every 6 hours as needed for Wheezing, Disp: , Rfl:     albuterol sulfate  (90 Base) MCG/ACT inhaler, Inhale 2 puffs into the lungs every 6 hours as needed for Wheezing, Disp: , Rfl:     mometasone-formoterol (DULERA) 200-5 MCG/ACT inhaler, Inhale 2 puffs into the lungs every 12 hours, Disp: , Rfl:     gabapentin (NEURONTIN) 300 MG capsule, Take 1 capsule by mouth 3 times daily (Patient taking differently: Take 300 mg by mouth 2 times daily .), Disp: 90 capsule, Rfl: 0    omega-3 acid ethyl esters (LOVAZA) 1 g capsule, , Disp: , Rfl:     loratadine (CLARITIN) 10 MG tablet, Take 10 mg by mouth daily as needed , Disp: , Rfl:     fluticasone (FLONASE) 50 MCG/ACT nasal spray, 1 spray by Nasal route daily, Disp: 1 Bottle, Rfl: 0    oxyCODONE-acetaminophen (PERCOCET)  MG per tablet, Take 1 tablet by mouth every 4 hours as needed for Pain . Earliest Fill Date: 6/8/17, Disp: 100 tablet, Rfl: 0    oxyCODONE (OXYCONTIN) 15 MG T12A extended release tablet, Take 1 tablet by mouth every 12 hours . Earliest Fill Date: 6/8/17, Disp: 60 tablet, Rfl: 0    tamoxifen (NOLVADEX) 20 MG tablet, Take 1 tablet by mouth daily, Disp: 90 tablet, Rfl: 6    Elastic Bandages & Supports (FUTURO SUPPORT GLOVE MEDIUM) MISC, 1 ready made support glove, Disp: 1 each, Rfl: 2    Compression Sleeve MISC, by Does not apply route 1 sleeve, 20-30 mmHg, Disp: 1 each, Rfl: 2    ALPRAZolam (XANAX) 1 MG tablet, Take 1 mg by mouth nightly as needed for Sleep, Disp: , Rfl:     LYRICA 50 MG capsule, TAKE 1 CAPSULE BY MOUTH TWICE DAILY (Patient taking differently: Take 25mg in the Morning and 75 mg at night.), Disp: 60 capsule, Rfl: 0    diclofenac sodium (VOLTAREN) 1 % GEL, Apply 2 g topically 4 times daily as needed. , Disp: 100 g, Rfl: 4    insulin lispro (HUMALOG) 100 UNIT/ML injection, Inject 30 Units into the skin 3 times daily (before meals). , Disp: , Rfl:     montelukast (SINGULAIR) 10 MG tablet, Take 10 mg by mouth daily. , Disp: , Rfl:     duloxetine (CYMBALTA) 60 MG capsule, Take 60 mg by mouth 2 times daily. , Disp: , Rfl:     clonazepam (KLONOPIN) 0.5 MG tablet, Take 1 mg by mouth 2 times daily as needed. , Disp: , Rfl:     lubiprostone (AMITIZA) 24 MCG capsule, Take 24 mcg by mouth 2 times daily (with meals). , Disp: , Rfl:     metoprolol (LOPRESSOR) 25 MG tablet, Take 50 mg by mouth 2 times daily , Disp: , Rfl:   Allergies: Iv dye [iodides] and Trazodone and nefazodone  Social History:    reports that she has never smoked. She has never used smokeless tobacco. She reports that she does not drink alcohol or use drugs.   Family History:   Family History   Problem Relation Age of Onset    Asthma Other     Cancer Other     Depression Other     Diabetes Other     Hypertension Other     High Cholesterol Other     Migraines tenderness, deformity or injury. Range of motion is normal and pain-free. There is no gross instability. There are no rashes, ulcerations or lesions. Strength and tone are normal. No atrophy or abnormal movements are noted. · LEFT LOWER EXTREMITY:  Inspection/examination of the left lower extremity does not show any tenderness, deformity or injury. Range of motion is normal and pain-free. There is no gross instability. There are no rashes, ulcerations or lesions. Strength and tone are normal. No atrophy or abnormal movements are noted. Diagnostic Testing:    No new diagnostics  Results for orders placed or performed during the hospital encounter of 19/06/25   Basic Metabolic Panel   Result Value Ref Range    Sodium 138 136 - 145 mmol/L    Potassium 4.1 3.5 - 5.1 mmol/L    Chloride 98 (L) 99 - 110 mmol/L    CO2 25 21 - 32 mmol/L    Anion Gap 15 3 - 16    Glucose 94 70 - 99 mg/dL    BUN 32 (H) 7 - 20 mg/dL    CREATININE 0.6 0.6 - 1.1 mg/dL    GFR Non-African American >60 >60    GFR African American >60 >60    Calcium 9.7 8.3 - 10.6 mg/dL   Brain Natriuretic Peptide   Result Value Ref Range    Pro- (H) 0 - 124 pg/mL   Basic Metabolic Panel   Result Value Ref Range    Sodium 142 136 - 145 mmol/L    Potassium 4.3 3.5 - 5.1 mmol/L    Chloride 99 99 - 110 mmol/L    CO2 30 21 - 32 mmol/L    Anion Gap 13 3 - 16    Glucose 101 (H) 70 - 99 mg/dL    BUN 31 (H) 7 - 20 mg/dL    CREATININE 0.9 0.6 - 1.1 mg/dL    GFR Non-African American >60 >60    GFR African American >60 >60    Calcium 10.1 8.3 - 10.6 mg/dL   Brain Natriuretic Peptide   Result Value Ref Range    Pro- 0 - 124 pg/mL     Impression:       1. DDD (degenerative disc disease), lumbar    2. Lumbar radiculopathy    3. Spondylosis of lumbar region without myelopathy or radiculopathy    4. History of breast cancer        Plan:  Clinical Course: Worsening new onset low back and bilateral leg pain  1. Medications:  No new medications to add  2.  PT:

## 2018-02-06 ENCOUNTER — TELEPHONE (OUTPATIENT)
Dept: CARDIOLOGY CLINIC | Age: 48
End: 2018-02-06

## 2018-02-06 LAB
ANION GAP SERPL CALCULATED.3IONS-SCNC: 15 MMOL/L (ref 3–16)
BUN BLDV-MCNC: 31 MG/DL (ref 7–20)
CALCIUM SERPL-MCNC: 8.6 MG/DL (ref 8.3–10.6)
CHLORIDE BLD-SCNC: 94 MMOL/L (ref 99–110)
CO2: 29 MMOL/L (ref 21–32)
CREAT SERPL-MCNC: 0.8 MG/DL (ref 0.6–1.1)
GFR AFRICAN AMERICAN: >60
GFR NON-AFRICAN AMERICAN: >60
GLUCOSE BLD-MCNC: 418 MG/DL (ref 70–99)
POTASSIUM SERPL-SCNC: 4.3 MMOL/L (ref 3.5–5.1)
PRO-BNP: 183 PG/ML (ref 0–124)
SODIUM BLD-SCNC: 138 MMOL/L (ref 136–145)

## 2018-02-06 NOTE — TELEPHONE ENCOUNTER
HHN calling, pt reports being SOB and Dizzy today, Mildly tachycardic HHN- 105-110 and spouse states yesterday HR in the 140's, Pt is unstable with CHF, had 4 lb wt loss since yesterday, wt didn't go up.  Pls call to advise Thank you

## 2018-02-07 ENCOUNTER — TELEPHONE (OUTPATIENT)
Dept: CARDIOLOGY CLINIC | Age: 48
End: 2018-02-07

## 2018-02-07 ENCOUNTER — HOSPITAL ENCOUNTER (OUTPATIENT)
Dept: ONCOLOGY | Age: 48
Discharge: HOME | End: 2018-02-28
Attending: INTERNAL MEDICINE | Admitting: INTERNAL MEDICINE

## 2018-02-07 ENCOUNTER — OFFICE VISIT (OUTPATIENT)
Dept: CARDIOLOGY CLINIC | Age: 48
End: 2018-02-07

## 2018-02-07 VITALS
SYSTOLIC BLOOD PRESSURE: 136 MMHG | HEART RATE: 102 BPM | OXYGEN SATURATION: 96 % | BODY MASS INDEX: 33.97 KG/M2 | DIASTOLIC BLOOD PRESSURE: 76 MMHG | HEIGHT: 64 IN | WEIGHT: 199 LBS

## 2018-02-07 VITALS — HEART RATE: 89 BPM | TEMPERATURE: 97.2 F | SYSTOLIC BLOOD PRESSURE: 125 MMHG | DIASTOLIC BLOOD PRESSURE: 77 MMHG

## 2018-02-07 DIAGNOSIS — I50.22 SYSTOLIC CHF, CHRONIC (HCC): ICD-10-CM

## 2018-02-07 DIAGNOSIS — I25.10 CORONARY ARTERY DISEASE INVOLVING NATIVE CORONARY ARTERY OF NATIVE HEART WITHOUT ANGINA PECTORIS: ICD-10-CM

## 2018-02-07 DIAGNOSIS — R06.02 SOB (SHORTNESS OF BREATH): Primary | ICD-10-CM

## 2018-02-07 DIAGNOSIS — I10 ESSENTIAL HYPERTENSION: ICD-10-CM

## 2018-02-07 LAB — VITAMIN D 25-HYDROXY: 51.8 NG/ML

## 2018-02-07 PROCEDURE — G8427 DOCREV CUR MEDS BY ELIG CLIN: HCPCS | Performed by: INTERNAL MEDICINE

## 2018-02-07 PROCEDURE — 1111F DSCHRG MED/CURRENT MED MERGE: CPT | Performed by: INTERNAL MEDICINE

## 2018-02-07 PROCEDURE — G8417 CALC BMI ABV UP PARAM F/U: HCPCS | Performed by: INTERNAL MEDICINE

## 2018-02-07 PROCEDURE — 1036F TOBACCO NON-USER: CPT | Performed by: INTERNAL MEDICINE

## 2018-02-07 PROCEDURE — G8598 ASA/ANTIPLAT THER USED: HCPCS | Performed by: INTERNAL MEDICINE

## 2018-02-07 PROCEDURE — G8484 FLU IMMUNIZE NO ADMIN: HCPCS | Performed by: INTERNAL MEDICINE

## 2018-02-07 PROCEDURE — 99215 OFFICE O/P EST HI 40 MIN: CPT | Performed by: INTERNAL MEDICINE

## 2018-02-07 RX ORDER — FUROSEMIDE 10 MG/ML
INJECTION INTRAMUSCULAR; INTRAVENOUS
Status: DISPENSED
Start: 2018-02-07 | End: 2018-02-08

## 2018-02-07 RX ORDER — METOLAZONE 2.5 MG/1
TABLET ORAL
Qty: 30 TABLET | Refills: 0 | Status: ON HOLD | OUTPATIENT
Start: 2018-02-07 | End: 2018-03-25

## 2018-02-07 RX ORDER — SODIUM CHLORIDE 0.9 % (FLUSH) 0.9 %
SYRINGE (ML) INJECTION
Status: DISPENSED
Start: 2018-02-07 | End: 2018-02-08

## 2018-02-07 RX ORDER — FUROSEMIDE 10 MG/ML
120 INJECTION INTRAMUSCULAR; INTRAVENOUS ONCE
Status: COMPLETED | OUTPATIENT
Start: 2018-02-07 | End: 2018-02-07

## 2018-02-07 RX ADMIN — FUROSEMIDE 120 MG: 10 INJECTION INTRAMUSCULAR; INTRAVENOUS at 12:23

## 2018-02-07 NOTE — PROGRESS NOTES
[address]  Advanced CHF/Pulmonary Hypertension   Cardiac       [facility] Javed Footman  YOB: 1970    Date of Visit:  2/7/18      Chief Complaint   Patient presents with    Shortness of Breath        History of Present Illness:  Aaron Youssef is a 49 y.o. female with PMH significant for CAD with cardiac cath 2014 showing diffuse LAD disease and small vessel disease with normal RCA and LCX (treated medically).  Breast cancer 8 years ago s/p mastectomy. She has had issues with fluid overload but multiple echos have shown normal EF and diastolic function. Cardiac MRI performed showed normal LVEF 61% and no evidence of constriction. She was hospitalized 1/12-1/19 after presenting to hospital with increased SOB, orthopnea, PND and weight gain of 21 pounds in previous past week. CXR showed mild pulmonary edema, proBNP 277. VQ neg for PE and flu negative. She was treated for exacerbation of asthma and HFpEF. Ten years ago she had radiation for breast cancer. Her  is here with her and states that she is worse. She is getting worse, has gained about 15 pounds and it's  harder for him to get around. He has difficulty waking her up at times and is wondering if her oxygen levels are okay when that occurs. She takes lasix 60 mg in the am and 10 mg in the  pm, not having good urine output. She took one dose on metolazone and she lost 4 pounds overnight and gained it back. She keeps her fluid intake to six glasses a day and they don't eat sodium. She took prednisone for a short time while she was in the hospital but is done with it. She takes lisinopril 2.5 mg, was on a higher dose. 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 She has LUIS but does not wear her cpap, unable to tolerate. We discussed untreated LUIS and the effects it has on swelling if not treated. Most of her swelling is in her abdomen. We reviewed labs from yesterday and they look okay. Allergies   Allergen Reactions    Iv Dye [Iodides] Anaphylaxis     allergic to ct scan dye, not the MRI    Trazodone And Nefazodone      Current Outpatient Prescriptions   Medication Sig Dispense Refill    furosemide (LASIX) 40 MG tablet Take 80 mg in am and 80 mg in pm 270 tablet 1    metolazone (ZAROXOLYN) 2.5 MG tablet Take 1 tablet by mouth daily for 1 day 1 tablet 0    lisinopril (PRINIVIL;ZESTRIL) 2.5 MG tablet Take 1 tablet by mouth daily 90 tablet 1    simvastatin (ZOCOR) 20 MG tablet Take 1 tablet by mouth nightly 90 tablet 3    predniSONE (DELTASONE) 10 MG tablet Prednisone Taper: 40 mg x 3 days, 30 mg x 3 days, 20 mg x 3 days, 10 mg x 3 days then resume 5 mg daily 30 tablet 0    insulin glargine (LANTUS) 100 UNIT/ML injection pen Inject 50 Units into the skin 2 times daily 5 pen 3    polyethylene glycol (GLYCOLAX) powder Take 17 g by mouth daily      aspirin 81 MG EC tablet Take 81 mg by mouth daily      omeprazole (PRILOSEC) 20 MG delayed release capsule Take 20 mg by mouth 2 times daily      sulfaSALAzine (AZULFIDINE) 500 MG tablet Take 500 mg by mouth 2 times daily      calcium carbonate-vitamin D (CALCIUM 600+D) 600-200 MG-UNIT TABS Take 1 tablet by mouth 2 times daily      benztropine (COGENTIN) 1 MG tablet Take 1 mg by mouth nightly      metFORMIN (GLUCOPHAGE-XR) 500 MG extended release tablet Take 500 mg by mouth 2 times daily (with meals)      Levothyroxine Sodium (TIROSINT) 125 MCG CAPS Take by mouth Daily      cyclobenzaprine (FLEXERIL) 10 MG tablet Take 10 mg by mouth 3 times daily as needed for Muscle spasms      Cholecalciferol (VITAMIN D) 2000 units CAPS capsule Take 1 capsule by mouth daily (with breakfast)      glimepiride (AMARYL) 4 MG tablet Take 4 mg by mouth every morning (before breakfast)      hydroxychloroquine (PLAQUENIL) 200 MG tablet Take 200 mg by mouth 2 times daily      ferrous sulfate 325 (65 Fe) MG tablet Take 325 mg by mouth 2 times daily

## 2018-02-08 ENCOUNTER — TELEPHONE (OUTPATIENT)
Dept: CARDIOLOGY CLINIC | Age: 48
End: 2018-02-08

## 2018-02-09 ENCOUNTER — TELEPHONE (OUTPATIENT)
Dept: CARDIOLOGY CLINIC | Age: 48
End: 2018-02-09

## 2018-02-09 NOTE — TELEPHONE ENCOUNTER
Did they take the metolazone yet? I suggest trying that first.  Need to be meticulous about fluid intake.       ARETHA

## 2018-02-09 NOTE — TELEPHONE ENCOUNTER
Informed HHN and pt's  as per understanding and informed to call us on Monday to give an update since metolazone was started today as per pt's  and pt had diarrhea yesterday but it seems to have stopped today.

## 2018-02-12 ENCOUNTER — TELEPHONE (OUTPATIENT)
Dept: CARDIOLOGY CLINIC | Age: 48
End: 2018-02-12

## 2018-02-12 LAB
ANION GAP SERPL CALCULATED.3IONS-SCNC: 12 MMOL/L (ref 3–16)
BILIRUBIN URINE: NEGATIVE
BLOOD, URINE: NEGATIVE
BUN BLDV-MCNC: 30 MG/DL (ref 7–20)
CALCIUM SERPL-MCNC: 9.5 MG/DL (ref 8.3–10.6)
CHLORIDE BLD-SCNC: 95 MMOL/L (ref 99–110)
CLARITY: CLEAR
CO2: 29 MMOL/L (ref 21–32)
COLOR: YELLOW
CREAT SERPL-MCNC: 0.9 MG/DL (ref 0.6–1.1)
GFR AFRICAN AMERICAN: >60
GFR NON-AFRICAN AMERICAN: >60
GLUCOSE BLD-MCNC: 112 MG/DL (ref 70–99)
GLUCOSE URINE: NEGATIVE MG/DL
KETONES, URINE: NEGATIVE MG/DL
LEUKOCYTE ESTERASE, URINE: NEGATIVE
MICROSCOPIC EXAMINATION: NORMAL
NITRITE, URINE: NEGATIVE
PH UA: 6
POTASSIUM SERPL-SCNC: 4.7 MMOL/L (ref 3.5–5.1)
PRO-BNP: 125 PG/ML (ref 0–124)
PROTEIN UA: NEGATIVE MG/DL
SODIUM BLD-SCNC: 136 MMOL/L (ref 136–145)
SPECIFIC GRAVITY UA: 1.01
URINE TYPE: NORMAL
UROBILINOGEN, URINE: 0.2 E.U./DL

## 2018-02-12 NOTE — TELEPHONE ENCOUNTER
Spoke with patient's . Patient was last seen by Dr Devora Cárdenas and metolazone added 2-3 times a week. She has been taking every day and weight still going up. She also feels as if she has to urinate but only going in small amounts and when she wipes, there is blood on tissue. Have called [facility] and requested nurse visit today for labs and urine sample. Patient establishing with new PCP and does not have appt until 2/20.

## 2018-02-13 ENCOUNTER — TELEPHONE (OUTPATIENT)
Dept: CARDIOLOGY CLINIC | Age: 48
End: 2018-02-13

## 2018-02-13 DIAGNOSIS — I50.30 (HFPEF) HEART FAILURE WITH PRESERVED EJECTION FRACTION (HCC): Primary | ICD-10-CM

## 2018-02-13 LAB — URINE CULTURE, ROUTINE: NORMAL

## 2018-02-13 RX ORDER — TORSEMIDE 20 MG/1
40 TABLET ORAL 2 TIMES DAILY
Qty: 120 TABLET | Refills: 0 | Status: SHIPPED | OUTPATIENT
Start: 2018-02-13 | End: 2018-03-02 | Stop reason: DRUGHIGH

## 2018-02-14 ENCOUNTER — HOSPITAL ENCOUNTER (OUTPATIENT)
Dept: ULTRASOUND IMAGING | Age: 48
Discharge: HOME | End: 2018-02-14
Attending: CLINICAL NURSE SPECIALIST | Admitting: CLINICAL NURSE SPECIALIST

## 2018-02-14 ENCOUNTER — HOSPITAL ENCOUNTER (OUTPATIENT)
Dept: ONCOLOGY | Age: 48
Discharge: HOME | End: 2018-02-15
Admitting: INTERNAL MEDICINE

## 2018-02-14 ENCOUNTER — TELEPHONE (OUTPATIENT)
Dept: CARDIOLOGY CLINIC | Age: 48
End: 2018-02-14

## 2018-02-14 VITALS — HEART RATE: 87 BPM | OXYGEN SATURATION: 90 % | WEIGHT: 207.6 LBS | BODY MASS INDEX: 35.63 KG/M2 | TEMPERATURE: 96.5 F

## 2018-02-14 DIAGNOSIS — R74.8 INCREASED CREATINE KINASE LEVEL: Primary | ICD-10-CM

## 2018-02-14 DIAGNOSIS — R74.8 ABNORMAL LEVELS OF OTHER SERUM ENZYMES: ICD-10-CM

## 2018-02-14 DIAGNOSIS — R74.8 INCREASED CREATINE KINASE LEVEL: ICD-10-CM

## 2018-02-14 LAB
ANION GAP SERPL CALCULATED.3IONS-SCNC: 13 MMOL/L (ref 3–16)
BUN BLDV-MCNC: 36 MG/DL (ref 7–20)
CALCIUM SERPL-MCNC: 8.7 MG/DL (ref 8.3–10.6)
CHLORIDE BLD-SCNC: 92 MMOL/L (ref 99–110)
CO2: 29 MMOL/L (ref 21–32)
CREAT SERPL-MCNC: 1.3 MG/DL (ref 0.6–1.1)
GFR AFRICAN AMERICAN: 53
GFR NON-AFRICAN AMERICAN: 44
GLUCOSE BLD-MCNC: 147 MG/DL (ref 70–99)
POTASSIUM SERPL-SCNC: 4.1 MMOL/L (ref 3.5–5.1)
SODIUM BLD-SCNC: 134 MMOL/L (ref 136–145)

## 2018-02-14 PROCEDURE — 99215 OFFICE O/P EST HI 40 MIN: CPT | Performed by: CLINICAL NURSE SPECIALIST

## 2018-02-14 RX ORDER — FUROSEMIDE 10 MG/ML
INJECTION INTRAMUSCULAR; INTRAVENOUS
Status: DISPENSED
Start: 2018-02-14 | End: 2018-02-15

## 2018-02-14 RX ORDER — FUROSEMIDE 10 MG/ML
120 INJECTION INTRAMUSCULAR; INTRAVENOUS ONCE
Status: COMPLETED | OUTPATIENT
Start: 2018-02-14 | End: 2018-02-14

## 2018-02-14 RX ADMIN — FUROSEMIDE 120 MG: 10 INJECTION INTRAMUSCULAR; INTRAVENOUS at 12:52

## 2018-02-14 NOTE — TELEPHONE ENCOUNTER
Malissa Villarreal had spoken with patient's  and informed him of [facility] request to come to hospital and register for infusion clinic appointment at 11:30 am.  He stated that he was taking his wife to a dental appointment and was about an hour away. He was wondering if he could bring her around 1pm.  I informed him I would check with NPRG and call him back. After checking with NPR[facility], and she contacting infusion clinic as there are no further openings today, RG called and spoke with patient's .

## 2018-02-14 NOTE — TELEPHONE ENCOUNTER
HHN calling pt wt on 02/11/18 was 196.8 today it is 207.8, O2 is 89% on room air, Pt complaining of SOB at rest and with activity, Increased Edema, Needs to sit in chair to sleep, needs extra pillows to sleep. Needs to know what to do?  Pls call to advise Thank you

## 2018-02-14 NOTE — TELEPHONE ENCOUNTER
I spoke with the pt  and he stated that he is on the way, may be a little late. I asked if he had got the message to meet at the infusion center, he didn't answer and just said he is on the way.

## 2018-02-14 NOTE — PROGRESS NOTES
Tonsillectomy; and Finger contracture surgery. Social History:   reports that she has never smoked. She has never used smokeless tobacco. She reports that she does not drink alcohol or use drugs. Family History:   Family History   Problem Relation Age of Onset    Asthma Other     Cancer Other     Depression Other     Diabetes Other     Hypertension Other     High Cholesterol Other     Migraines Other     High Blood Pressure Mother        Home Medications:  Prior to Admission medications    Medication Sig Start Date End Date Taking? Authorizing Provider   torsemide (DEMADEX) 20 MG tablet Take 2 tablets by mouth 2 times daily 2/13/18   Sd Tobias CNP   metolazone (ZAROXOLYN) 2.5 MG tablet Take 1 tablet daily as needed for weight gain.  2/7/18   Mark Masters MD   metolazone (ZAROXOLYN) 2.5 MG tablet Take 1 tablet by mouth daily for 1 day 2/2/18 2/3/18  Lindsay Marcelo CNP   lisinopril (PRINIVIL;ZESTRIL) 2.5 MG tablet Take 1 tablet by mouth daily 1/24/18   Lindsay Marcelo CNP   simvastatin (ZOCOR) 20 MG tablet Take 1 tablet by mouth nightly 1/24/18   Sd Tobias CNP   predniSONE (DELTASONE) 10 MG tablet Prednisone Taper: 40 mg x 3 days, 30 mg x 3 days, 20 mg x 3 days, 10 mg x 3 days then resume 5 mg daily 1/19/18   Birgit Freeman CNP   insulin glargine (LANTUS) 100 UNIT/ML injection pen Inject 50 Units into the skin 2 times daily 1/19/18   Markus Giraldo MD   polyethylene glycol (GLYCOLAX) powder Take 17 g by mouth daily    Historical Provider, MD   aspirin 81 MG EC tablet Take 81 mg by mouth daily    Historical Provider, MD   omeprazole (PRILOSEC) 20 MG delayed release capsule Take 20 mg by mouth 2 times daily    Historical Provider, MD   sulfaSALAzine (AZULFIDINE) 500 MG tablet Take 500 mg by mouth 2 times daily    Historical Provider, MD   calcium carbonate-vitamin D (CALCIUM 600+D) 600-200 MG-UNIT TABS Take 1 tablet by mouth 2 times daily    Historical Provider, MD metoprolol (LOPRESSOR) 25 MG tablet Take 50 mg by mouth 2 times daily     Historical Provider, MD        Allergies: Iv dye [iodides] and Trazodone and nefazodone     Review of Systems:   · Constitutional: there has been no unanticipated weight loss. There's been no change in energy level, sleep pattern, or activity level. +weight gain  · Eyes: No visual changes or diplopia. No scleral icterus. · ENT: No Headaches, hearing loss or vertigo. No mouth sores or sore throat. · Cardiovascular: Reviewed in HPI  · Respiratory: No cough or wheezing, no sputum production. No hematemesis. · Gastrointestinal: No abdominal pain, appetite loss, blood in stools. No change in bowel or bladder habits. +sob  · Genitourinary: No dysuria, trouble voiding, or hematuria. · Musculoskeletal:  No gait disturbance, weakness or joint complaints. · Integumentary: No rash or pruritis. · Neurological: No headache, diplopia, change in muscle strength, numbness or tingling. No change in gait, balance, coordination, mood, affect, memory, mentation, behavior. · Psychiatric: No anxiety, no depression. · Endocrine: No malaise, fatigue or temperature intolerance. No excessive thirst, fluid intake, or urination. No tremor. · Hematologic/Lymphatic: No abnormal bruising or bleeding, blood clots or swollen lymph nodes. · Allergic/Immunologic: No nasal congestion or hives.     Physical Examination:    Vitals:    02/14/18 1145 02/14/18 1231   Pulse:  87   Temp:  96.5 °F (35.8 °C)   TempSrc:  Temporal   SpO2: 93% 90%   Weight:  207 lb 9.6 oz (94.2 kg)        Constitutional and General Appearance: Warm and dry, no apparent distress, normal coloration  HEENT:  Normocephalic, atraumatic  Respiratory:  · Normal excursion and expansion without use of accessory muscles  · Resp Auscultation: Normal breath sounds without dullness  Cardiovascular:  · The apical impulses not displaced  · Heart tones are crisp and normal  · JVP normal cm H2O  · Regular borderline concentric left ventricular hypertrophy.   E/e\"= 13     Stress test 11/8/2017  There is normal isotope uptake at stress and rest. There is no evidence of  myocardial ischemia or scar.  Normal LV function.  Overall findings represent a low risk scan.       VQ scan negative 1/11/2018    Cardiac MRI 5/12/82015  this cardiac MRI shows a non-dilated left ventricle with normal systolic function, a non-dilated right ventricle with low normal systolic function and no delayed gadolinium enhancement to suggest infarction, scar, infiltrative heart disease, myocarditis or pericarditis. There are no findings suggestive of constriction      Assessment:    1. HFpEF  2. SOB  3. CAD  4. HTN, essential  5. Hypervolemia  6. Renal insufficiency    Plan:   Discussed with Dr Lizzie Gonzales via phone. She will speak to interventional cardiology regarding a LHC/RHC to assess for restrictive cardiac process    1. Will give patient 120 mg IV lasix x 1 today  2. Will schedule renal ultrasound today at 730 pm  3. Pickup torsemide today and take 40 mg twice a day  4. Instructed to drink only 64 ounces of any fluid a day  5. Try pop lamont, ice or sugar free candy to help with thirst  6. Plan for renal ultrasound today to assess for stone and increased creat    Plan written down and given to patient in infusion center  >40 minutes time spent with patient and  for plan of care, patient education and care coordination. I appreciate the opportunity of cooperating in the care of this individual.    ELANA Aguilar, 2/14/2018, 3:38 PM    QUALITY MEASURES  1. Tobacco Cessation Counseling: NA  2. Retake of BP if >140/90:   NA  3. Documentation to PCP/referring for new patient:  Sent to PCP at close of office visit  4. CAD patient on anti-platelet: NA  5. CAD patient on STATIN therapy:  Yes  6.  Patient with CHF and aFib on anticoagulation:  NA

## 2018-02-15 ENCOUNTER — TELEPHONE (OUTPATIENT)
Dept: CARDIOLOGY CLINIC | Age: 48
End: 2018-02-15

## 2018-02-15 ENCOUNTER — TELEPHONE (OUTPATIENT)
Dept: PULMONOLOGY | Age: 48
End: 2018-02-15

## 2018-02-15 ENCOUNTER — TELEPHONE (OUTPATIENT)
Dept: ORTHOPEDIC SURGERY | Age: 48
End: 2018-02-15

## 2018-02-15 NOTE — TELEPHONE ENCOUNTER
Patient is having a dental filling and needs a form completed and faxed to her dentist Jamal Villalpando at [facility] [phone]. Dental appointment is 2/12/18.   asmita

## 2018-02-15 NOTE — TELEPHONE ENCOUNTER
This was faxed by [facility] earlier today. Lakeshia Castro NP     2/15/18 2:07 PM   Note      I completed the form for his wife (dentist) and faxed it early this morning.   I can not do his form or give clearance  thanks

## 2018-02-16 ENCOUNTER — TELEPHONE (OUTPATIENT)
Dept: CARDIOLOGY | Age: 48
End: 2018-02-16

## 2018-02-16 ENCOUNTER — TELEPHONE (OUTPATIENT)
Dept: PULMONOLOGY | Age: 48
End: 2018-02-16

## 2018-02-16 ENCOUNTER — TELEPHONE (OUTPATIENT)
Dept: CARDIOLOGY CLINIC | Age: 48
End: 2018-02-16

## 2018-02-16 NOTE — TELEPHONE ENCOUNTER
Char Reynolds will call patient and instruct patient to call Arabella Puckett NP at Dr Amita Vences office for an appt

## 2018-02-19 ENCOUNTER — TELEPHONE (OUTPATIENT)
Dept: PULMONOLOGY | Age: 48
End: 2018-02-19

## 2018-02-19 ENCOUNTER — TELEPHONE (OUTPATIENT)
Dept: CARDIOLOGY CLINIC | Age: 48
End: 2018-02-19

## 2018-02-19 NOTE — TELEPHONE ENCOUNTER
Wants CODY to order an oxygen monitor to be shipped overnight since her oxygen level went down to 86% over the weekend .  Please call

## 2018-02-20 LAB
ANION GAP SERPL CALCULATED.3IONS-SCNC: 13 MMOL/L (ref 3–16)
BUN BLDV-MCNC: 30 MG/DL (ref 7–20)
CALCIUM SERPL-MCNC: 8.9 MG/DL (ref 8.3–10.6)
CHLORIDE BLD-SCNC: 92 MMOL/L (ref 99–110)
CO2: 33 MMOL/L (ref 21–32)
CREAT SERPL-MCNC: 1 MG/DL (ref 0.6–1.1)
GFR AFRICAN AMERICAN: >60
GFR NON-AFRICAN AMERICAN: 59
GLUCOSE BLD-MCNC: 243 MG/DL (ref 70–99)
POTASSIUM SERPL-SCNC: 4.3 MMOL/L (ref 3.5–5.1)
SODIUM BLD-SCNC: 138 MMOL/L (ref 136–145)

## 2018-02-21 ENCOUNTER — OFFICE VISIT (OUTPATIENT)
Dept: CARDIOLOGY CLINIC | Age: 48
End: 2018-02-21

## 2018-02-21 ENCOUNTER — TELEPHONE (OUTPATIENT)
Dept: CARDIOLOGY CLINIC | Age: 48
End: 2018-02-21

## 2018-02-21 VITALS
OXYGEN SATURATION: 93 % | SYSTOLIC BLOOD PRESSURE: 116 MMHG | DIASTOLIC BLOOD PRESSURE: 76 MMHG | BODY MASS INDEX: 34.88 KG/M2 | HEART RATE: 81 BPM | HEIGHT: 64 IN | WEIGHT: 204.3 LBS

## 2018-02-21 DIAGNOSIS — I25.83 CORONARY ARTERY DISEASE DUE TO LIPID RICH PLAQUE: ICD-10-CM

## 2018-02-21 DIAGNOSIS — R06.02 SOB (SHORTNESS OF BREATH): ICD-10-CM

## 2018-02-21 DIAGNOSIS — I25.10 CORONARY ARTERY DISEASE DUE TO LIPID RICH PLAQUE: ICD-10-CM

## 2018-02-21 DIAGNOSIS — I50.30 (HFPEF) HEART FAILURE WITH PRESERVED EJECTION FRACTION (HCC): Primary | ICD-10-CM

## 2018-02-21 DIAGNOSIS — E87.70 HYPERVOLEMIA, UNSPECIFIED HYPERVOLEMIA TYPE: ICD-10-CM

## 2018-02-21 DIAGNOSIS — I10 ESSENTIAL HYPERTENSION: ICD-10-CM

## 2018-02-21 PROCEDURE — G8417 CALC BMI ABV UP PARAM F/U: HCPCS | Performed by: CLINICAL NURSE SPECIALIST

## 2018-02-21 PROCEDURE — G8427 DOCREV CUR MEDS BY ELIG CLIN: HCPCS | Performed by: CLINICAL NURSE SPECIALIST

## 2018-02-21 PROCEDURE — 99214 OFFICE O/P EST MOD 30 MIN: CPT | Performed by: CLINICAL NURSE SPECIALIST

## 2018-02-21 PROCEDURE — 1036F TOBACCO NON-USER: CPT | Performed by: CLINICAL NURSE SPECIALIST

## 2018-02-21 PROCEDURE — G8484 FLU IMMUNIZE NO ADMIN: HCPCS | Performed by: CLINICAL NURSE SPECIALIST

## 2018-02-21 PROCEDURE — G8598 ASA/ANTIPLAT THER USED: HCPCS | Performed by: CLINICAL NURSE SPECIALIST

## 2018-02-21 RX ORDER — INSULIN ASPART 100 [IU]/ML
30 INJECTION, SOLUTION INTRAVENOUS; SUBCUTANEOUS
COMMUNITY
Start: 2018-02-07 | End: 2018-08-02 | Stop reason: SDUPTHER

## 2018-02-21 NOTE — PROGRESS NOTES
has never smoked. She has never used smokeless tobacco. She reports that she does not drink alcohol or use drugs. Family History:   Family History   Problem Relation Age of Onset    Asthma Other     Cancer Other     Depression Other     Diabetes Other     Hypertension Other     High Cholesterol Other     Migraines Other     High Blood Pressure Mother        Home Medications:  Prior to Admission medications    Medication Sig Start Date End Date Taking? Authorizing Provider   NOVOLOG FLEXPEN 100 UNIT/ML injection pen Sliding scale 2/7/18  Yes Historical Provider, MD   torsemide (DEMADEX) 20 MG tablet Take 2 tablets by mouth 2 times daily 2/13/18  Yes Padmini Tobias CNP   metolazone (ZAROXOLYN) 2.5 MG tablet Take 1 tablet daily as needed for weight gain.  2/7/18  Yes Lindsey Schafer MD   lisinopril (PRINIVIL;ZESTRIL) 2.5 MG tablet Take 1 tablet by mouth daily 1/24/18  Yes Brice Jacinto CNP   simvastatin (ZOCOR) 20 MG tablet Take 1 tablet by mouth nightly 1/24/18  Yes Padmini Tobias CNP   insulin glargine (LANTUS) 100 UNIT/ML injection pen Inject 50 Units into the skin 2 times daily 1/19/18  Yes Christine Pagan MD   polyethylene glycol [facility]) powder Take 17 g by mouth daily   Yes Historical Provider, MD   aspirin 81 MG EC tablet Take 81 mg by mouth daily   Yes Historical Provider, MD   omeprazole (PRILOSEC) 20 MG delayed release capsule Take 20 mg by mouth 2 times daily   Yes Historical Provider, MD   sulfaSALAzine (AZULFIDINE) 500 MG tablet Take 500 mg by mouth 2 times daily   Yes Historical Provider, MD   calcium carbonate-vitamin D (CALCIUM 600+D) 600-200 MG-UNIT TABS Take 1 tablet by mouth 2 times daily   Yes Historical Provider, MD   benztropine (COGENTIN) 1 MG tablet Take 1 mg by mouth nightly   Yes Historical Provider, MD   metFORMIN (GLUCOPHAGE-XR) 500 MG extended release tablet Take 500 mg by mouth 2 times daily (with meals)   Yes Historical Provider, MD   Levothyroxine Sodium

## 2018-02-22 ENCOUNTER — TELEPHONE (OUTPATIENT)
Dept: CARDIOLOGY CLINIC | Age: 48
End: 2018-02-22

## 2018-02-22 ENCOUNTER — HOSPITAL ENCOUNTER (OUTPATIENT)
Dept: OTHER | Age: 48
Discharge: HOME | End: 2018-02-22
Attending: NURSE PRACTITIONER | Admitting: NURSE PRACTITIONER

## 2018-02-22 ENCOUNTER — OFFICE VISIT (OUTPATIENT)
Dept: PULMONOLOGY | Age: 48
End: 2018-02-22

## 2018-02-22 VITALS
HEIGHT: 64 IN | RESPIRATION RATE: 28 BRPM | SYSTOLIC BLOOD PRESSURE: 114 MMHG | OXYGEN SATURATION: 95 % | WEIGHT: 203 LBS | HEART RATE: 100 BPM | BODY MASS INDEX: 34.66 KG/M2 | DIASTOLIC BLOOD PRESSURE: 76 MMHG

## 2018-02-22 DIAGNOSIS — I50.30 HEART FAILURE WITH PRESERVED EJECTION FRACTION (HCC): ICD-10-CM

## 2018-02-22 DIAGNOSIS — R09.02 HYPOXIA: ICD-10-CM

## 2018-02-22 DIAGNOSIS — J45.50 SEVERE PERSISTENT ASTHMA WITHOUT COMPLICATION: ICD-10-CM

## 2018-02-22 DIAGNOSIS — R09.02 HYPOXIA: Primary | ICD-10-CM

## 2018-02-22 PROCEDURE — G8427 DOCREV CUR MEDS BY ELIG CLIN: HCPCS | Performed by: NURSE PRACTITIONER

## 2018-02-22 PROCEDURE — G8484 FLU IMMUNIZE NO ADMIN: HCPCS | Performed by: NURSE PRACTITIONER

## 2018-02-22 PROCEDURE — G8417 CALC BMI ABV UP PARAM F/U: HCPCS | Performed by: NURSE PRACTITIONER

## 2018-02-22 PROCEDURE — 1036F TOBACCO NON-USER: CPT | Performed by: NURSE PRACTITIONER

## 2018-02-22 PROCEDURE — 99214 OFFICE O/P EST MOD 30 MIN: CPT | Performed by: NURSE PRACTITIONER

## 2018-02-22 PROCEDURE — G8598 ASA/ANTIPLAT THER USED: HCPCS | Performed by: NURSE PRACTITIONER

## 2018-02-22 ASSESSMENT — ENCOUNTER SYMPTOMS
SINUS PRESSURE: 0
DIARRHEA: 0
WHEEZING: 1
SHORTNESS OF BREATH: 1
COLOR CHANGE: 0
VOMITING: 0
SORE THROAT: 0
ABDOMINAL PAIN: 0

## 2018-02-22 NOTE — TELEPHONE ENCOUNTER
Discussed with .   He is requesting a  be available during the procedure  Amr can you please make sure this is available  thanks

## 2018-02-22 NOTE — PROGRESS NOTES
[facility] Follow Up Note    Subjective:   CHIEF COMPLAINT / HPI: SOB, CHF recent hospitalization     The patient is 49 y.o. female who presents today for an acute visit related to the above mentioned issues. The patient reports that she has had weight gain and worsening SOB since her hospital discharge last month. Her  has documented several events where her oxygen saturations have been less than 90% on RA at home. She notes worsening dyspnea with any activity which requires prolonged periods of rest. She is sometimes dyspneic at rest as well. There is minimal cough. There have been no fevers. As noted above, she reports weight gain at home with worsening upper and lower extremity swelling but has a trend of weight loss as documented at her recent [facility] visits. She does not use her nebulizer or JARRET because she feels she doesn't need it. She does use Dulera.         Past Medical History:   Diagnosis Date    Anxiety     Arthritis     Asthma     Cancer ([facility]     breast    Constipation     COPD (chronic obstructive pulmonary disease) ([facility]     Depression     Diabetes mellitus ([facility]     Diabetic polyneuropathy associated with type 2 diabetes mellitus ([facility] 2/2/2018    Dysthymia 2/2/2018    Gastric ulcer, unspecified as acute or chronic, without mention of hemorrhage, perforation, or obstruction     HIGH CHOLESTEROL     Hypertension     Hypothyroidism     Severe persistent asthma without complication 3/7/4845    Thyroid disease     TIA (transient ischemic attack)     with occasional left leg and hand weakness     Social History:    History   Smoking Status    Never Smoker   Smokeless Tobacco    Never Used     Current Medications:  Current Outpatient Prescriptions on File Prior to Visit   Medication Sig Dispense Refill    NOVOLOG FLEXPEN 100 UNIT/ML injection pen Sliding scale      torsemide (DEMADEX) 20 MG tablet Take 2 tablets by mouth 2 times daily 120 tablet 0    metolazone (ZAROXOLYN) 2.5 MG tablet Take 1 tablet daily as needed for weight gain.  30 tablet 0    metolazone (ZAROXOLYN) 2.5 MG tablet Take 1 tablet by mouth daily for 1 day 1 tablet 0    lisinopril (PRINIVIL;ZESTRIL) 2.5 MG tablet Take 1 tablet by mouth daily 90 tablet 1    simvastatin (ZOCOR) 20 MG tablet Take 1 tablet by mouth nightly 90 tablet 3    insulin glargine (LANTUS) 100 UNIT/ML injection pen Inject 50 Units into the skin 2 times daily 5 pen 3    polyethylene glycol (GLYCOLAX) powder Take 17 g by mouth daily      aspirin 81 MG EC tablet Take 81 mg by mouth daily      omeprazole (PRILOSEC) 20 MG delayed release capsule Take 20 mg by mouth 2 times daily      sulfaSALAzine (AZULFIDINE) 500 MG tablet Take 500 mg by mouth 2 times daily      calcium carbonate-vitamin D (CALCIUM 600+D) 600-200 MG-UNIT TABS Take 1 tablet by mouth 2 times daily      benztropine (COGENTIN) 1 MG tablet Take 1 mg by mouth nightly      metFORMIN (GLUCOPHAGE-XR) 500 MG extended release tablet Take 500 mg by mouth 2 times daily (with meals)      Levothyroxine Sodium (TIROSINT) 125 MCG CAPS Take by mouth Daily      cyclobenzaprine (FLEXERIL) 10 MG tablet Take 10 mg by mouth 3 times daily as needed for Muscle spasms      Cholecalciferol (VITAMIN D) 2000 units CAPS capsule Take 1 capsule by mouth daily (with breakfast)      glimepiride (AMARYL) 4 MG tablet Take 4 mg by mouth every morning (before breakfast)      hydroxychloroquine (PLAQUENIL) 200 MG tablet Take 200 mg by mouth 2 times daily      ferrous sulfate 325 (65 Fe) MG tablet Take 325 mg by mouth 2 times daily (with meals)      sennosides-docusate sodium (SENOKOT-S) 8.6-50 MG tablet Take 1 tablet by mouth daily      folic acid (FOLVITE) 1 MG tablet Take 1 mg by mouth daily      lurasidone (LATUDA) 40 MG TABS tablet Take 40 mg by mouth Daily with supper      leflunomide (ARAVA) 20 MG tablet Take 20 mg by mouth daily      albuterol (ACCUNEB) 1.25 MG/3ML

## 2018-02-22 NOTE — TELEPHONE ENCOUNTER
Patient would like for angiogram to be done through her arm. Please call patient and advise.   asmita

## 2018-02-23 ENCOUNTER — TELEPHONE (OUTPATIENT)
Dept: CARDIOLOGY CLINIC | Age: 48
End: 2018-02-23

## 2018-02-26 ENCOUNTER — TELEPHONE (OUTPATIENT)
Dept: CARDIOLOGY CLINIC | Age: 48
End: 2018-02-26

## 2018-03-01 ENCOUNTER — HOSPITAL ENCOUNTER (OUTPATIENT)
Dept: OTHER | Age: 48
Discharge: HOME | End: 2018-03-31
Attending: INTERNAL MEDICINE | Admitting: INTERNAL MEDICINE

## 2018-03-01 ENCOUNTER — HOSPITAL ENCOUNTER (OUTPATIENT)
Dept: ONCOLOGY | Age: 48
Discharge: HOME | End: 2018-03-01
Attending: INTERNAL MEDICINE | Admitting: INTERNAL MEDICINE

## 2018-03-01 ENCOUNTER — OFFICE VISIT (OUTPATIENT)
Dept: CARDIOLOGY CLINIC | Age: 48
End: 2018-03-01

## 2018-03-01 ENCOUNTER — HOSPITAL ENCOUNTER (OUTPATIENT)
Dept: ONCOLOGY | Age: 48
Discharge: HOME | End: 2018-03-31
Admitting: INTERNAL MEDICINE

## 2018-03-01 VITALS
DIASTOLIC BLOOD PRESSURE: 66 MMHG | HEART RATE: 84 BPM | SYSTOLIC BLOOD PRESSURE: 100 MMHG | BODY MASS INDEX: 34.33 KG/M2 | WEIGHT: 200 LBS

## 2018-03-01 VITALS — HEART RATE: 74 BPM | DIASTOLIC BLOOD PRESSURE: 63 MMHG | TEMPERATURE: 96.4 F | SYSTOLIC BLOOD PRESSURE: 98 MMHG

## 2018-03-01 DIAGNOSIS — I42.9 CARDIOMYOPATHY, UNSPECIFIED TYPE (HCC): Chronic | ICD-10-CM

## 2018-03-01 DIAGNOSIS — N28.9 RENAL INSUFFICIENCY: ICD-10-CM

## 2018-03-01 DIAGNOSIS — I25.83 CORONARY ARTERY DISEASE DUE TO LIPID RICH PLAQUE: Chronic | ICD-10-CM

## 2018-03-01 DIAGNOSIS — I50.30 (HFPEF) HEART FAILURE WITH PRESERVED EJECTION FRACTION (HCC): Chronic | ICD-10-CM

## 2018-03-01 DIAGNOSIS — I25.10 CORONARY ARTERY DISEASE DUE TO LIPID RICH PLAQUE: Chronic | ICD-10-CM

## 2018-03-01 DIAGNOSIS — I50.33 ACUTE ON CHRONIC DIASTOLIC CONGESTIVE HEART FAILURE (HCC): Primary | ICD-10-CM

## 2018-03-01 DIAGNOSIS — I25.10 CORONARY ARTERY DISEASE INVOLVING NATIVE CORONARY ARTERY OF NATIVE HEART WITHOUT ANGINA PECTORIS: Chronic | ICD-10-CM

## 2018-03-01 PROBLEM — I10 ESSENTIAL HYPERTENSION: Chronic | Status: ACTIVE | Noted: 2018-01-12

## 2018-03-01 PROBLEM — E78.5 HLD (HYPERLIPIDEMIA): Chronic | Status: ACTIVE | Noted: 2017-11-08

## 2018-03-01 LAB
A/G RATIO: 1.1 (ref 1.1–2.2)
ALBUMIN SERPL-MCNC: 3.7 G/DL (ref 3.4–5)
ALP BLD-CCNC: 59 U/L (ref 40–129)
ALT SERPL-CCNC: 14 U/L (ref 10–40)
ANION GAP SERPL CALCULATED.3IONS-SCNC: 13 MMOL/L (ref 3–16)
AST SERPL-CCNC: 19 U/L (ref 15–37)
BILIRUB SERPL-MCNC: <0.2 MG/DL (ref 0–1)
BUN BLDV-MCNC: 44 MG/DL (ref 7–20)
CALCIUM SERPL-MCNC: 9.2 MG/DL (ref 8.3–10.6)
CHLORIDE BLD-SCNC: 92 MMOL/L (ref 99–110)
CO2: 30 MMOL/L (ref 21–32)
CREAT SERPL-MCNC: 1.1 MG/DL (ref 0.6–1.1)
GFR AFRICAN AMERICAN: >60
GFR NON-AFRICAN AMERICAN: 53
GLOBULIN: 3.3 G/DL
GLUCOSE BLD-MCNC: 292 MG/DL (ref 70–99)
HCT VFR BLD CALC: 31.4 % (ref 36–48)
HEMOGLOBIN: 10.6 G/DL (ref 12–16)
MCH RBC QN AUTO: 29.4 PG (ref 26–34)
MCHC RBC AUTO-ENTMCNC: 33.7 G/DL (ref 31–36)
MCV RBC AUTO: 87.3 FL (ref 80–100)
PDW BLD-RTO: 13.6 % (ref 12.4–15.4)
PLATELET # BLD: 255 K/UL (ref 135–450)
PMV BLD AUTO: 10.1 FL (ref 5–10.5)
POTASSIUM SERPL-SCNC: 4.1 MMOL/L (ref 3.5–5.1)
PRO-BNP: 235 PG/ML (ref 0–124)
RBC # BLD: 3.6 M/UL (ref 4–5.2)
SODIUM BLD-SCNC: 135 MMOL/L (ref 136–145)
T4 FREE: 1.2 NG/DL (ref 0.9–1.8)
TOTAL PROTEIN: 7 G/DL (ref 6.4–8.2)
TSH REFLEX: 4.24 UIU/ML (ref 0.27–4.2)
WBC # BLD: 7.8 K/UL (ref 4–11)

## 2018-03-01 PROCEDURE — G8417 CALC BMI ABV UP PARAM F/U: HCPCS | Performed by: INTERNAL MEDICINE

## 2018-03-01 PROCEDURE — G8598 ASA/ANTIPLAT THER USED: HCPCS | Performed by: INTERNAL MEDICINE

## 2018-03-01 PROCEDURE — 99215 OFFICE O/P EST HI 40 MIN: CPT | Performed by: INTERNAL MEDICINE

## 2018-03-01 PROCEDURE — 1036F TOBACCO NON-USER: CPT | Performed by: INTERNAL MEDICINE

## 2018-03-01 PROCEDURE — 36415 COLL VENOUS BLD VENIPUNCTURE: CPT | Performed by: INTERNAL MEDICINE

## 2018-03-01 PROCEDURE — G8427 DOCREV CUR MEDS BY ELIG CLIN: HCPCS | Performed by: INTERNAL MEDICINE

## 2018-03-01 PROCEDURE — G8484 FLU IMMUNIZE NO ADMIN: HCPCS | Performed by: INTERNAL MEDICINE

## 2018-03-01 RX ORDER — FUROSEMIDE 10 MG/ML
120 INJECTION INTRAMUSCULAR; INTRAVENOUS ONCE
Status: COMPLETED | OUTPATIENT
Start: 2018-03-01 | End: 2018-03-01

## 2018-03-01 RX ORDER — METOPROLOL TARTRATE 50 MG/1
50 TABLET, FILM COATED ORAL 2 TIMES DAILY
Qty: 180 TABLET | Refills: 1 | Status: ON HOLD | OUTPATIENT
Start: 2018-03-01 | End: 2018-03-25

## 2018-03-01 RX ORDER — LISINOPRIL 5 MG/1
5 TABLET ORAL DAILY
Qty: 90 TABLET | Refills: 1 | Status: ON HOLD | OUTPATIENT
Start: 2018-03-01 | End: 2018-03-25

## 2018-03-01 RX ORDER — SODIUM CHLORIDE 0.9 % (FLUSH) 0.9 %
SYRINGE (ML) INJECTION
Status: DISPENSED
Start: 2018-03-01 | End: 2018-03-02

## 2018-03-01 RX ADMIN — FUROSEMIDE 120 MG: 10 INJECTION INTRAMUSCULAR; INTRAVENOUS at 12:36

## 2018-03-01 NOTE — PROGRESS NOTES
 Levothyroxine Sodium (TIROSINT) 125 MCG CAPS Take by mouth Daily      cyclobenzaprine (FLEXERIL) 10 MG tablet Take 10 mg by mouth 3 times daily as needed for Muscle spasms      Cholecalciferol (VITAMIN D) 2000 units CAPS capsule Take 1 capsule by mouth daily (with breakfast)      glimepiride (AMARYL) 4 MG tablet Take 4 mg by mouth every morning (before breakfast)      hydroxychloroquine (PLAQUENIL) 200 MG tablet Take 200 mg by mouth 2 times daily      ferrous sulfate 325 (65 Fe) MG tablet Take 325 mg by mouth 2 times daily (with meals)      sennosides-docusate sodium (SENOKOT-S) 8.6-50 MG tablet Take 1 tablet by mouth daily      folic acid (FOLVITE) 1 MG tablet Take 1 mg by mouth daily      lurasidone (LATUDA) 40 MG TABS tablet Take 40 mg by mouth Daily with supper      leflunomide (ARAVA) 20 MG tablet Take 20 mg by mouth daily      albuterol (ACCUNEB) 1.25 MG/3ML nebulizer solution Inhale 1 ampule into the lungs every 6 hours as needed for Wheezing      albuterol sulfate  (90 Base) MCG/ACT inhaler Inhale 2 puffs into the lungs every 6 hours as needed for Wheezing      mometasone-formoterol (DULERA) 200-5 MCG/ACT inhaler Inhale 2 puffs into the lungs every 12 hours      gabapentin (NEURONTIN) 300 MG capsule Take 1 capsule by mouth 3 times daily (Patient taking differently: Take 300 mg by mouth 2 times daily .) 90 capsule 0    omega-3 acid ethyl esters (LOVAZA) 1 g capsule       loratadine (CLARITIN) 10 MG tablet Take 10 mg by mouth daily as needed       fluticasone (FLONASE) 50 MCG/ACT nasal spray 1 spray by Nasal route daily 1 Bottle 0    oxyCODONE-acetaminophen (PERCOCET)  MG per tablet Take 1 tablet by mouth every 4 hours as needed for Pain . Earliest Fill Date: 6/8/17 100 tablet 0    oxyCODONE (OXYCONTIN) 15 MG T12A extended release tablet Take 1 tablet by mouth every 12 hours .  Earliest Fill Date: 6/8/17 60 tablet 0    tamoxifen (NOLVADEX) 20 MG tablet Take 1 tablet by mouth preserved ejection fraction. Appears volume overloaded considering edema and stable weight today. Acute on chronic diastolic HF   2. Coronary artery disease/chest pain. Cause undetermined. [address] 2014 with diffuse LAD disease and small vessel. No ischemia on stress test 11/2017.     3. Essential hypertension. Controlled. Under age 60, goal BP <140/90.    4. Shortness of Breath (SOB)--on exertion; no worse, no better. Will get IV lasix today. Plan:  Stop metolazone and increase torsemide to 100 mg in the mornings and 50 mg in the afternoons. Rx will say 100 mg twice daily but will start with it differently. Labs today cmp, cbcd, bnp.  RTO in 2-3 weeks with RG NP. Consult Dr. Lis Cornejo for consultation of renal insufficiency. IV lasix 120 mg at infusion clinic today. Will plan to send a lasix kit with patient for [facility] usage as needed per my order. I spent 50 minutes today with patient and , examining patient, developing plan of care, and arranging for patient to receive acute treatment with IV lasix infusion. I appreciate the opportunity of cooperating in the care of this patient.     Markus Mckeon M.D., [facility]

## 2018-03-01 NOTE — LETTER
2 times daily      sulfaSALAzine (AZULFIDINE) 500 MG tablet Take 500 mg by mouth 2 times daily      calcium carbonate-vitamin D (CALCIUM 600+D) 600-200 MG-UNIT TABS Take 1 tablet by mouth 2 times daily      benztropine (COGENTIN) 1 MG tablet Take 1 mg by mouth nightly      metFORMIN (GLUCOPHAGE-XR) 500 MG extended release tablet Take 500 mg by mouth 2 times daily (with meals)      Levothyroxine Sodium (TIROSINT) 125 MCG CAPS Take by mouth Daily      cyclobenzaprine (FLEXERIL) 10 MG tablet Take 10 mg by mouth 3 times daily as needed for Muscle spasms      Cholecalciferol (VITAMIN D) 2000 units CAPS capsule Take 1 capsule by mouth daily (with breakfast)      glimepiride (AMARYL) 4 MG tablet Take 4 mg by mouth every morning (before breakfast)      hydroxychloroquine (PLAQUENIL) 200 MG tablet Take 200 mg by mouth 2 times daily      ferrous sulfate 325 (65 Fe) MG tablet Take 325 mg by mouth 2 times daily (with meals)      sennosides-docusate sodium (SENOKOT-S) 8.6-50 MG tablet Take 1 tablet by mouth daily      folic acid (FOLVITE) 1 MG tablet Take 1 mg by mouth daily      lurasidone (LATUDA) 40 MG TABS tablet Take 40 mg by mouth Daily with supper      leflunomide (ARAVA) 20 MG tablet Take 20 mg by mouth daily      albuterol (ACCUNEB) 1.25 MG/3ML nebulizer solution Inhale 1 ampule into the lungs every 6 hours as needed for Wheezing      albuterol sulfate  (90 Base) MCG/ACT inhaler Inhale 2 puffs into the lungs every 6 hours as needed for Wheezing      mometasone-formoterol (DULERA) 200-5 MCG/ACT inhaler Inhale 2 puffs into the lungs every 12 hours      gabapentin (NEURONTIN) 300 MG capsule Take 1 capsule by mouth 3 times daily (Patient taking differently: Take 300 mg by mouth 2 times daily .) 90 capsule 0    omega-3 acid ethyl esters (LOVAZA) 1 g capsule       loratadine (CLARITIN) 10 MG tablet Take 10 mg by mouth daily as needed  fluticasone (FLONASE) 50 MCG/ACT nasal spray 1 spray by Nasal route daily 1 Bottle 0    oxyCODONE-acetaminophen (PERCOCET)  MG per tablet Take 1 tablet by mouth every 4 hours as needed for Pain . Earliest Fill Date: 6/8/17 100 tablet 0    oxyCODONE (OXYCONTIN) 15 MG T12A extended release tablet Take 1 tablet by mouth every 12 hours . Earliest Fill Date: 6/8/17 60 tablet 0    tamoxifen (NOLVADEX) 20 MG tablet Take 1 tablet by mouth daily 90 tablet 6    Elastic Bandages & Supports (FUTURO SUPPORT GLOVE MEDIUM) MISC 1 ready made support glove 1 each 2    Compression Sleeve MISC by Does not apply route 1 sleeve, 20-30 mmHg 1 each 2    ALPRAZolam (XANAX) 1 MG tablet Take 1 mg by mouth 2 times daily .  LYRICA 50 MG capsule TAKE 1 CAPSULE BY MOUTH TWICE DAILY (Patient taking differently: Take 25mg in the Morning and 75 mg at night.) 60 capsule 0    diclofenac sodium (VOLTAREN) 1 % GEL Apply 2 g topically 4 times daily as needed. 100 g 4    montelukast (SINGULAIR) 10 MG tablet Take 10 mg by mouth daily.  duloxetine (CYMBALTA) 60 MG capsule Take 60 mg by mouth 2 times daily.  clonazepam (KLONOPIN) 0.5 MG tablet Take 1 mg by mouth 2 times daily as needed.  lubiprostone (AMITIZA) 24 MCG capsule Take 24 mcg by mouth 2 times daily (with meals).  metoprolol (LOPRESSOR) 25 MG tablet Take 50 mg by mouth 2 times daily        No current facility-administered medications for this visit.         Past Medical History:   Diagnosis Date    Anxiety     Arthritis     Asthma     Cancer ([facility] [address]     breast    Constipation     COPD (chronic obstructive pulmonary disease) ([facility]     Depression     Diabetes mellitus ([facility]     Diabetic polyneuropathy associated with type 2 diabetes mellitus ([facility] 2/2/2018    Dysthymia 2/2/2018    Gastric ulcer, unspecified as acute or chronic, without mention of hemorrhage, perforation, or obstruction     HIGH CHOLESTEROL     Hypertension  Hypothyroidism     Severe persistent asthma without complication 9/1/4323    Thyroid disease     TIA (transient ischemic attack)     with occasional left leg and hand weakness     Past Surgical History:   Procedure Laterality Date    BREAST SURGERY      left mastectomy    CARPAL TUNNEL RELEASE      Bilateral    FINGER CONTRACTURE SURGERY      HYSTERECTOMY      TONSILLECTOMY       Family History   Problem Relation Age of Onset    Asthma Other     Cancer Other     Depression Other     Diabetes Other     Hypertension Other     High Cholesterol Other     Migraines Other     High Blood Pressure Mother      Social History     Social History    Marital status:      Spouse name: Laurel Russo Number of children: 3    Years of education: N/A     Occupational History    Not on file. Social History Main Topics    Smoking status: Never Smoker    Smokeless tobacco: Never Used    Alcohol use No    Drug use: No    Sexual activity: Not on file     Other Topics Concern    Not on file     Social History Narrative    No narrative on file       Review of Systems:   · Constitutional: there has been no unanticipated weight loss. There's been no change in energy level, sleep pattern, or activity level. · Eyes: No visual changes or diplopia. No scleral icterus. · ENT: No Headaches, hearing loss or vertigo. No mouth sores or sore throat. · Cardiovascular: Reviewed in HPI  · Respiratory: No cough or wheezing, no sputum production. No hematemesis. · Gastrointestinal: No abdominal pain, appetite loss, blood in stools. No change in bowel or bladder habits. · Genitourinary: No dysuria, trouble voiding, or hematuria. · Musculoskeletal:  No gait disturbance, weakness or joint complaints. · Integumentary: No rash or pruritis. · Neurological: No headache, diplopia, change in muscle strength, numbness or tingling. No change in gait, balance, coordination, mood, affect, memory, mentation, behavior. · Psychiatric: No anxiety, no depression. · Endocrine: No malaise, fatigue or temperature intolerance. No excessive thirst, fluid intake, or urination. No tremor. · Hematologic/Lymphatic: No abnormal bruising or bleeding, blood clots or swollen lymph nodes. · Allergic/Immunologic: No nasal congestion or hives. Physical Examination:    /66   Pulse 84   Wt 200 lb (90.7 kg)   BMI 34.33 kg/m²     Baseline Low but stable. Wt Readings from Last 3 Encounters:   02/22/18 203 lb (92.1 kg)   02/21/18 204 lb 4.8 oz (92.7 kg)   02/14/18 207 lb 9.6 oz (94.2 kg)     BP Readings from Last 3 Encounters:   02/07/18 125/77   02/22/18 114/76   02/21/18 116/76     Constitutional and General Appearance:   WD/WN who is obviously uncomfortable with shortness of breath  HEENT:  NC/AT  XANDER  No problems with hearing  Skin:  Warm, dry  Respiratory:  · Normal excursion and expansion without use of accessory muscles  · Resp Auscultation: Normal breath sounds without dullness  Cardiovascular:  · The apical impulses not displaced  · Heart tones are crisp and normal  · Cervical veins are not engorged  · The carotid upstroke is normal in amplitude and contour without delay or bruit  · JVP 12-13 cm H2O  RRR with nl S1 and S2 without m,r,g  · Peripheral pulses are symmetrical and full  · There is no clubbing, cyanosis of the extremities.   · Trace bilateral edema  · Femoral Arteries: 2+ and equal  · Pedal Pulses: 2+ and equal   Neck:  · No thyromegaly  Abdomen: increased abdominal girth  · No masses or tenderness  · Liver/Spleen: No Abnormalities Noted  Neurological/Psychiatric:  · Alert and oriented in all spheres  · Moves all extremities well  · Exhibits normal gait balance and coordination  · No abnormalities of mood, affect, memory, mentation, or behavior are noted  Diagnostics:     CXR 1/16/18  No acute cardiopulmonary disease     Echo 11/8/2017:  Normal left ventricle size and systolic function with an estimated

## 2018-03-02 ENCOUNTER — TELEPHONE (OUTPATIENT)
Dept: CARDIOLOGY CLINIC | Age: 48
End: 2018-03-02

## 2018-03-02 ENCOUNTER — HOSPITAL ENCOUNTER (OUTPATIENT)
Dept: MRI IMAGING | Age: 48
Discharge: HOME | End: 2018-03-02
Attending: PHYSICAL MEDICINE & REHABILITATION | Admitting: PHYSICAL MEDICINE & REHABILITATION

## 2018-03-02 DIAGNOSIS — Z85.3 HISTORY OF BREAST CANCER: ICD-10-CM

## 2018-03-02 DIAGNOSIS — M54.16 LUMBAR RADICULOPATHY: ICD-10-CM

## 2018-03-02 DIAGNOSIS — I50.30 (HFPEF) HEART FAILURE WITH PRESERVED EJECTION FRACTION (HCC): Primary | Chronic | ICD-10-CM

## 2018-03-02 DIAGNOSIS — M51.36 DDD (DEGENERATIVE DISC DISEASE), LUMBAR: ICD-10-CM

## 2018-03-02 DIAGNOSIS — M47.816 SPONDYLOSIS OF LUMBAR REGION WITHOUT MYELOPATHY OR RADICULOPATHY: ICD-10-CM

## 2018-03-02 DIAGNOSIS — R06.02 SOB (SHORTNESS OF BREATH): Chronic | ICD-10-CM

## 2018-03-02 DIAGNOSIS — I42.9 CARDIOMYOPATHY, UNSPECIFIED TYPE (HCC): Chronic | ICD-10-CM

## 2018-03-02 RX ORDER — TORSEMIDE 100 MG/1
TABLET ORAL
Qty: 60 TABLET | Refills: 5 | Status: SHIPPED | OUTPATIENT
Start: 2018-03-02 | End: 2018-03-02 | Stop reason: SDUPTHER

## 2018-03-02 RX ORDER — TORSEMIDE 100 MG/1
TABLET ORAL
Qty: 60 TABLET | Refills: 5 | Status: ON HOLD | OUTPATIENT
Start: 2018-03-02 | End: 2018-03-25

## 2018-03-03 NOTE — COMMUNICATION BODY
lasix infusion over the past couple of week. She takes lisinopril 2.5 mg, was on a higher dose. 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 She has LUIS but does not wear her cpap, unable to tolerate. We discussed untreated LUIS and the effects it has on swelling if not treated. Most of her swelling is in her abdomen. We reviewed recent labs and they look okay. She had a cardiac cath this week. Coronaries show small vessel CAD due to diabetes. No constriction or restriction. PCWP is about 21. Allergies   Allergen Reactions    Iv Dye [Iodides] Anaphylaxis     allergic to ct scan dye, not the MRI    Trazodone And Nefazodone      Current Outpatient Prescriptions   Medication Sig Dispense Refill    NOVOLOG FLEXPEN 100 UNIT/ML injection pen Sliding scale      torsemide (DEMADEX) 20 MG tablet Take 2 tablets by mouth 2 times daily 120 tablet 0    metolazone (ZAROXOLYN) 2.5 MG tablet Take 1 tablet daily as needed for weight gain.  30 tablet 0    metolazone (ZAROXOLYN) 2.5 MG tablet Take 1 tablet by mouth daily for 1 day 1 tablet 0    lisinopril (PRINIVIL;ZESTRIL) 2.5 MG tablet Take 1 tablet by mouth daily 90 tablet 1    simvastatin (ZOCOR) 20 MG tablet Take 1 tablet by mouth nightly 90 tablet 3    insulin glargine (LANTUS) 100 UNIT/ML injection pen Inject 50 Units into the skin 2 times daily 5 pen 3    polyethylene glycol (GLYCOLAX) powder Take 17 g by mouth daily      aspirin 81 MG EC tablet Take 81 mg by mouth daily      omeprazole (PRILOSEC) 20 MG delayed release capsule Take 20 mg by mouth 2 times daily      sulfaSALAzine (AZULFIDINE) 500 MG tablet Take 500 mg by mouth 2 times daily      calcium carbonate-vitamin D (CALCIUM 600+D) 600-200 MG-UNIT TABS Take 1 tablet by mouth 2 times daily      benztropine (COGENTIN) 1 MG tablet Take 1 mg by mouth nightly      metFORMIN (GLUCOPHAGE-XR) 500 MG extended release tablet Take 500 mg by mouth 2 times daily (with meals) daily 90 tablet 6    Elastic Bandages & Supports (FUTURO SUPPORT GLOVE MEDIUM) MISC 1 ready made support glove 1 each 2    Compression Sleeve MISC by Does not apply route 1 sleeve, 20-30 mmHg 1 each 2    ALPRAZolam (XANAX) 1 MG tablet Take 1 mg by mouth 2 times daily .  LYRICA 50 MG capsule TAKE 1 CAPSULE BY MOUTH TWICE DAILY (Patient taking differently: Take 25mg in the Morning and 75 mg at night.) 60 capsule 0    diclofenac sodium (VOLTAREN) 1 % GEL Apply 2 g topically 4 times daily as needed. 100 g 4    montelukast (SINGULAIR) 10 MG tablet Take 10 mg by mouth daily.  duloxetine (CYMBALTA) 60 MG capsule Take 60 mg by mouth 2 times daily.  clonazepam (KLONOPIN) 0.5 MG tablet Take 1 mg by mouth 2 times daily as needed.  lubiprostone (AMITIZA) 24 MCG capsule Take 24 mcg by mouth 2 times daily (with meals).  metoprolol (LOPRESSOR) 25 MG tablet Take 50 mg by mouth 2 times daily        No current facility-administered medications for this visit.         Past Medical History:   Diagnosis Date    Anxiety     Arthritis     Asthma     Cancer ([facility] [address]     breast    Constipation     COPD (chronic obstructive pulmonary disease) ([facility]     Depression     Diabetes mellitus ([facility]     Diabetic polyneuropathy associated with type 2 diabetes mellitus ([facility] 2/2/2018    Dysthymia 2/2/2018    Gastric ulcer, unspecified as acute or chronic, without mention of hemorrhage, perforation, or obstruction     HIGH CHOLESTEROL     Hypertension     Hypothyroidism     Severe persistent asthma without complication 4/5/3560    Thyroid disease     TIA (transient ischemic attack)     with occasional left leg and hand weakness     Past Surgical History:   Procedure Laterality Date    BREAST SURGERY      left mastectomy    CARPAL TUNNEL RELEASE      Bilateral    FINGER CONTRACTURE SURGERY      HYSTERECTOMY      TONSILLECTOMY       Family History   Problem Relation Age of Onset    Asthma Other     Cancer Other     Depression Other     Diabetes Other     Hypertension Other     High Cholesterol Other     Migraines Other     High Blood Pressure Mother      Social History     Social History    Marital status:      Spouse name: Christiano Cummings Number of children: 3    Years of education: N/A     Occupational History    Not on file. Social History Main Topics    Smoking status: Never Smoker    Smokeless tobacco: Never Used    Alcohol use No    Drug use: No    Sexual activity: Not on file     Other Topics Concern    Not on file     Social History Narrative    No narrative on file       Review of Systems:   · Constitutional: there has been no unanticipated weight loss. There's been no change in energy level, sleep pattern, or activity level. · Eyes: No visual changes or diplopia. No scleral icterus. · ENT: No Headaches, hearing loss or vertigo. No mouth sores or sore throat. · Cardiovascular: Reviewed in HPI  · Respiratory: No cough or wheezing, no sputum production. No hematemesis. · Gastrointestinal: No abdominal pain, appetite loss, blood in stools. No change in bowel or bladder habits. · Genitourinary: No dysuria, trouble voiding, or hematuria. · Musculoskeletal:  No gait disturbance, weakness or joint complaints. · Integumentary: No rash or pruritis. · Neurological: No headache, diplopia, change in muscle strength, numbness or tingling. No change in gait, balance, coordination, mood, affect, memory, mentation, behavior. · Psychiatric: No anxiety, no depression. · Endocrine: No malaise, fatigue or temperature intolerance. No excessive thirst, fluid intake, or urination. No tremor. · Hematologic/Lymphatic: No abnormal bruising or bleeding, blood clots or swollen lymph nodes. · Allergic/Immunologic: No nasal congestion or hives. Physical Examination:    /66   Pulse 84   Wt 200 lb (90.7 kg)   BMI 34.33 kg/m²     Baseline Low but stable.   Wt Readings from Last 3

## 2018-03-05 ENCOUNTER — TELEPHONE (OUTPATIENT)
Dept: CARDIOLOGY CLINIC | Age: 48
End: 2018-03-05

## 2018-03-05 ENCOUNTER — OFFICE VISIT (OUTPATIENT)
Dept: ORTHOPEDIC SURGERY | Age: 48
End: 2018-03-05

## 2018-03-05 VITALS
BODY MASS INDEX: 34.15 KG/M2 | WEIGHT: 200 LBS | DIASTOLIC BLOOD PRESSURE: 71 MMHG | SYSTOLIC BLOOD PRESSURE: 134 MMHG | HEIGHT: 64 IN

## 2018-03-05 DIAGNOSIS — M51.26 HNP (HERNIATED NUCLEUS PULPOSUS), LUMBAR: Primary | ICD-10-CM

## 2018-03-05 DIAGNOSIS — M54.16 LUMBAR RADICULOPATHY: ICD-10-CM

## 2018-03-05 PROCEDURE — G8484 FLU IMMUNIZE NO ADMIN: HCPCS | Performed by: PHYSICAL MEDICINE & REHABILITATION

## 2018-03-05 PROCEDURE — G8598 ASA/ANTIPLAT THER USED: HCPCS | Performed by: PHYSICAL MEDICINE & REHABILITATION

## 2018-03-05 PROCEDURE — 1036F TOBACCO NON-USER: CPT | Performed by: PHYSICAL MEDICINE & REHABILITATION

## 2018-03-05 PROCEDURE — G8427 DOCREV CUR MEDS BY ELIG CLIN: HCPCS | Performed by: PHYSICAL MEDICINE & REHABILITATION

## 2018-03-05 PROCEDURE — 99213 OFFICE O/P EST LOW 20 MIN: CPT | Performed by: PHYSICAL MEDICINE & REHABILITATION

## 2018-03-05 PROCEDURE — G8417 CALC BMI ABV UP PARAM F/U: HCPCS | Performed by: PHYSICAL MEDICINE & REHABILITATION

## 2018-03-05 RX ORDER — CYCLOBENZAPRINE HCL 10 MG
10 TABLET ORAL 2 TIMES DAILY
Qty: 60 TABLET | Refills: 0 | Status: SHIPPED | OUTPATIENT
Start: 2018-03-05 | End: 2018-03-13 | Stop reason: SDUPTHER

## 2018-03-05 NOTE — TELEPHONE ENCOUNTER
Instructed by [facility], to have [facility] draw labs this Thursday on the patient (BNP, BMP). This was just decided after phone call with patient's  and NPRG. LVM for HHN, Deysi that labs will need to be drawn on Thursday this week. Instructed to call our office if any questions.

## 2018-03-05 NOTE — TELEPHONE ENCOUNTER
Pt  calling for Elise Chris, needs to speak to her about the pt, would not give me details said he would explain to Elise Chris when she calls.  Pls call to advise Thank you

## 2018-03-05 NOTE — TELEPHONE ENCOUNTER
Patient's  wants to know how often lasix from kit should be given by [facility]. Per NPRG, this is only to be given if needed and we would contact N about administering this. I informed him of lab results from 3/1/18 and informed him that labs would not be needed this week as per earlier phone encounter today with Sanjana Daniels; per [id number] [facility]. NPRG here, and took conversation over and spoke with the patient's ,  Will forward to her this encounter.

## 2018-03-07 ENCOUNTER — TELEPHONE (OUTPATIENT)
Dept: CARDIOLOGY | Age: 48
End: 2018-03-07

## 2018-03-07 LAB
ANION GAP SERPL CALCULATED.3IONS-SCNC: 12 MMOL/L (ref 3–16)
BUN BLDV-MCNC: 34 MG/DL (ref 7–20)
CALCIUM SERPL-MCNC: 9.2 MG/DL (ref 8.3–10.6)
CHLORIDE BLD-SCNC: 97 MMOL/L (ref 99–110)
CO2: 32 MMOL/L (ref 21–32)
CREAT SERPL-MCNC: 1.1 MG/DL (ref 0.6–1.1)
GFR AFRICAN AMERICAN: >60
GFR NON-AFRICAN AMERICAN: 53
GLUCOSE BLD-MCNC: 203 MG/DL (ref 70–99)
POTASSIUM SERPL-SCNC: 5 MMOL/L (ref 3.5–5.1)
PRO-BNP: 248 PG/ML (ref 0–124)
SODIUM BLD-SCNC: 141 MMOL/L (ref 136–145)

## 2018-03-08 ENCOUNTER — OFFICE VISIT (OUTPATIENT)
Dept: PULMONOLOGY | Age: 48
End: 2018-03-08

## 2018-03-08 ENCOUNTER — TELEPHONE (OUTPATIENT)
Dept: ENDOCRINOLOGY | Age: 48
End: 2018-03-08

## 2018-03-08 VITALS
SYSTOLIC BLOOD PRESSURE: 107 MMHG | HEIGHT: 64 IN | WEIGHT: 204 LBS | HEART RATE: 84 BPM | BODY MASS INDEX: 34.83 KG/M2 | TEMPERATURE: 97.3 F | DIASTOLIC BLOOD PRESSURE: 64 MMHG | RESPIRATION RATE: 18 BRPM | OXYGEN SATURATION: 93 %

## 2018-03-08 DIAGNOSIS — I50.33 ACUTE ON CHRONIC DIASTOLIC CONGESTIVE HEART FAILURE (HCC): ICD-10-CM

## 2018-03-08 DIAGNOSIS — R09.02 HYPOXIA: Primary | ICD-10-CM

## 2018-03-08 DIAGNOSIS — J45.50 SEVERE PERSISTENT ASTHMA WITHOUT COMPLICATION: ICD-10-CM

## 2018-03-08 PROCEDURE — G8417 CALC BMI ABV UP PARAM F/U: HCPCS | Performed by: INTERNAL MEDICINE

## 2018-03-08 PROCEDURE — G8484 FLU IMMUNIZE NO ADMIN: HCPCS | Performed by: INTERNAL MEDICINE

## 2018-03-08 PROCEDURE — G8427 DOCREV CUR MEDS BY ELIG CLIN: HCPCS | Performed by: INTERNAL MEDICINE

## 2018-03-08 PROCEDURE — 99214 OFFICE O/P EST MOD 30 MIN: CPT | Performed by: INTERNAL MEDICINE

## 2018-03-08 PROCEDURE — G8598 ASA/ANTIPLAT THER USED: HCPCS | Performed by: INTERNAL MEDICINE

## 2018-03-08 PROCEDURE — 1036F TOBACCO NON-USER: CPT | Performed by: INTERNAL MEDICINE

## 2018-03-08 RX ORDER — LEVOTHYROXINE SODIUM 137 UG/1
137 TABLET ORAL DAILY
Qty: 30 TABLET | Refills: 3 | Status: SHIPPED | OUTPATIENT
Start: 2018-03-08 | End: 2018-07-13 | Stop reason: SDUPTHER

## 2018-03-08 RX ORDER — FORMOTEROL FUMARATE 20 UG/2ML
20 SOLUTION RESPIRATORY (INHALATION) 2 TIMES DAILY
Qty: 120 ML | Refills: 3 | Status: SHIPPED | OUTPATIENT
Start: 2018-03-08 | End: 2018-04-16 | Stop reason: ALTCHOICE

## 2018-03-08 RX ORDER — BUDESONIDE 0.5 MG/2ML
500 INHALANT ORAL 2 TIMES DAILY
Qty: 60 AMPULE | Refills: 3 | Status: SHIPPED | OUTPATIENT
Start: 2018-03-08 | End: 2018-04-16 | Stop reason: ALTCHOICE

## 2018-03-08 NOTE — COMMUNICATION BODY
Assessment:     Assessment:    1. Hypoxia     2. Severe persistent asthma without complication     3.  Acute on [facility]             Plan:     Plan:

## 2018-03-08 NOTE — PROGRESS NOTES
100 MG tablet Take 1 tab twice daily. 60 tablet 5    lisinopril (PRINIVIL;ZESTRIL) 5 MG tablet Take 1 tablet by mouth daily 90 tablet 1    metoprolol tartrate (LOPRESSOR) 50 MG tablet Take 1 tablet by mouth 2 times daily 180 tablet 1    NOVOLOG FLEXPEN 100 UNIT/ML injection pen Sliding scale      metolazone (ZAROXOLYN) 2.5 MG tablet Take 1 tablet daily as needed for weight gain.  30 tablet 0    metolazone (ZAROXOLYN) 2.5 MG tablet Take 1 tablet by mouth daily for 1 day (Patient taking differently: Take 2.5 mg by mouth every other day ) 1 tablet 0    simvastatin (ZOCOR) 20 MG tablet Take 1 tablet by mouth nightly 90 tablet 3    insulin glargine (LANTUS) 100 UNIT/ML injection pen Inject 50 Units into the skin 2 times daily 5 pen 3    polyethylene glycol (GLYCOLAX) powder Take 17 g by mouth daily      aspirin 81 MG EC tablet Take 81 mg by mouth daily      omeprazole (PRILOSEC) 20 MG delayed release capsule Take 20 mg by mouth 2 times daily      sulfaSALAzine (AZULFIDINE) 500 MG tablet Take 500 mg by mouth 2 times daily      calcium carbonate-vitamin D (CALCIUM 600+D) 600-200 MG-UNIT TABS Take 1 tablet by mouth 2 times daily      benztropine (COGENTIN) 1 MG tablet Take 1 mg by mouth nightly      metFORMIN (GLUCOPHAGE-XR) 500 MG extended release tablet Take 500 mg by mouth 2 times daily (with meals)      cyclobenzaprine (FLEXERIL) 10 MG tablet Take 10 mg by mouth 3 times daily as needed for Muscle spasms      Cholecalciferol (VITAMIN D) 2000 units CAPS capsule Take 1 capsule by mouth daily (with breakfast)      glimepiride (AMARYL) 4 MG tablet Take 4 mg by mouth every morning (before breakfast)      hydroxychloroquine (PLAQUENIL) 200 MG tablet Take 200 mg by mouth 2 times daily      ferrous sulfate 325 (65 Fe) MG tablet Take 325 mg by mouth 2 times daily (with meals)      sennosides-docusate sodium (SENOKOT-S) 8.6-50 MG tablet Take 1 tablet by mouth daily      folic acid (FOLVITE) 1 MG tablet Take 1 mg by mouth daily      lurasidone (LATUDA) 40 MG TABS tablet Take 40 mg by mouth Daily with supper      leflunomide (ARAVA) 20 MG tablet Take 20 mg by mouth daily      albuterol (ACCUNEB) 1.25 MG/3ML nebulizer solution Inhale 1 ampule into the lungs every 6 hours as needed for Wheezing      albuterol sulfate  (90 Base) MCG/ACT inhaler Inhale 2 puffs into the lungs every 6 hours as needed for Wheezing      mometasone-formoterol (DULERA) 200-5 MCG/ACT inhaler Inhale 2 puffs into the lungs every 12 hours      gabapentin (NEURONTIN) 300 MG capsule Take 1 capsule by mouth 3 times daily (Patient taking differently: Take 300 mg by mouth 2 times daily .) 90 capsule 0    omega-3 acid ethyl esters (LOVAZA) 1 g capsule Take 2 g by mouth 2 times daily       loratadine (CLARITIN) 10 MG tablet Take 10 mg by mouth daily as needed       fluticasone (FLONASE) 50 MCG/ACT nasal spray 1 spray by Nasal route daily 1 Bottle 0    oxyCODONE-acetaminophen (PERCOCET)  MG per tablet Take 1 tablet by mouth every 4 hours as needed for Pain . Earliest Fill Date: 6/8/17 100 tablet 0    oxyCODONE (OXYCONTIN) 15 MG T12A extended release tablet Take 1 tablet by mouth every 12 hours . Earliest Fill Date: 6/8/17 60 tablet 0    tamoxifen (NOLVADEX) 20 MG tablet Take 1 tablet by mouth daily 90 tablet 6    Elastic Bandages & Supports (FUTURO SUPPORT GLOVE MEDIUM) MISC 1 ready made support glove 1 each 2    Compression Sleeve MISC by Does not apply route 1 sleeve, 20-30 mmHg 1 each 2    ALPRAZolam (XANAX) 1 MG tablet Take 1 mg by mouth 2 times daily .  LYRICA 50 MG capsule TAKE 1 CAPSULE BY MOUTH TWICE DAILY (Patient taking differently: Take 25mg in the Morning and 75 mg at night.) 60 capsule 0    diclofenac sodium (VOLTAREN) 1 % GEL Apply 2 g topically 4 times daily as needed. 100 g 4    montelukast (SINGULAIR) 10 MG tablet Take 10 mg by mouth daily.         duloxetine (CYMBALTA) 60 MG capsule Take 60 mg by mouth 2

## 2018-03-12 ENCOUNTER — TELEPHONE (OUTPATIENT)
Dept: CARDIOLOGY CLINIC | Age: 48
End: 2018-03-12

## 2018-03-13 ENCOUNTER — TELEPHONE (OUTPATIENT)
Dept: CARDIOLOGY CLINIC | Age: 48
End: 2018-03-13

## 2018-03-13 ENCOUNTER — HOSPITAL ENCOUNTER (OUTPATIENT)
Dept: ONCOLOGY | Age: 48
Discharge: HOME | End: 2018-03-14
Admitting: INTERNAL MEDICINE

## 2018-03-13 VITALS
OXYGEN SATURATION: 96 % | HEART RATE: 86 BPM | SYSTOLIC BLOOD PRESSURE: 98 MMHG | RESPIRATION RATE: 18 BRPM | TEMPERATURE: 96.5 F | DIASTOLIC BLOOD PRESSURE: 62 MMHG

## 2018-03-13 PROBLEM — M06.9 RA (RHEUMATOID ARTHRITIS) (HCC): Status: ACTIVE | Noted: 2018-03-13

## 2018-03-13 PROBLEM — I50.33 ACUTE ON CHRONIC DIASTOLIC CHF (CONGESTIVE HEART FAILURE) (HCC): Status: ACTIVE | Noted: 2018-03-13

## 2018-03-13 LAB
ANION GAP SERPL CALCULATED.3IONS-SCNC: 15 MMOL/L (ref 3–16)
BUN BLDV-MCNC: 43 MG/DL (ref 7–20)
CALCIUM SERPL-MCNC: 8.5 MG/DL (ref 8.3–10.6)
CHLORIDE BLD-SCNC: 81 MMOL/L (ref 99–110)
CO2: 29 MMOL/L (ref 21–32)
CREAT SERPL-MCNC: 2.4 MG/DL (ref 0.6–1.1)
GFR AFRICAN AMERICAN: 26
GFR NON-AFRICAN AMERICAN: 21
GLUCOSE BLD-MCNC: 197 MG/DL (ref 70–99)
POTASSIUM SERPL-SCNC: 5.3 MMOL/L (ref 3.5–5.1)
PRO-BNP: 254 PG/ML (ref 0–124)
SODIUM BLD-SCNC: 125 MMOL/L (ref 136–145)

## 2018-03-13 RX ORDER — OXYCODONE HCL 20 MG/1
20 TABLET, FILM COATED, EXTENDED RELEASE ORAL EVERY 12 HOURS
COMMUNITY

## 2018-03-13 RX ORDER — FUROSEMIDE 10 MG/ML
120 INJECTION INTRAMUSCULAR; INTRAVENOUS ONCE
Status: DISCONTINUED | OUTPATIENT
Start: 2018-03-13 | End: 2018-03-15 | Stop reason: HOSPADM

## 2018-03-13 RX ORDER — SODIUM CHLORIDE 0.9 % (FLUSH) 0.9 %
SYRINGE (ML) INJECTION
Status: DISPENSED
Start: 2018-03-13 | End: 2018-03-13

## 2018-03-13 NOTE — TELEPHONE ENCOUNTER
I spoke with patient's  and he has agreed to come to infusion center today. Catherine is aware and will draw bnp and bmp.   She will receive 120 mg IV lasix x 1 today and then an infusion of 40 mg for 3-4 hours  Patient has not taken any metolazone since 3/1/18 as discussed at [address] 3/1/18  She has an appt with nephrology on 3/20/18

## 2018-03-13 NOTE — TELEPHONE ENCOUNTER
I agree. Maybe do an infusion in infusion center. 120 IV x 1 followed by 40 mg hourly x 3-4 hours. Is she taking the metolazone? Has she seen nephrology?     ARETHA

## 2018-03-16 ENCOUNTER — TELEPHONE (OUTPATIENT)
Dept: PULMONOLOGY | Age: 48
End: 2018-03-16

## 2018-03-16 PROBLEM — B96.89 UTI DUE TO KLEBSIELLA SPECIES: Status: ACTIVE | Noted: 2018-03-16

## 2018-03-16 PROBLEM — N39.0 UTI DUE TO KLEBSIELLA SPECIES: Status: ACTIVE | Noted: 2018-03-16

## 2018-03-16 PROBLEM — N39.0 URINARY TRACT INFECTION DUE TO ESBL KLEBSIELLA: Status: ACTIVE | Noted: 2018-03-16

## 2018-03-16 PROBLEM — B96.89 URINARY TRACT INFECTION DUE TO ESBL KLEBSIELLA: Status: ACTIVE | Noted: 2018-03-16

## 2018-03-21 PROBLEM — E11.65 POORLY CONTROLLED TYPE 2 DIABETES MELLITUS (HCC): Status: ACTIVE | Noted: 2018-01-12

## 2018-03-23 PROBLEM — I50.33 ACUTE ON CHRONIC DIASTOLIC CONGESTIVE HEART FAILURE (HCC): Chronic | Status: ACTIVE | Noted: 2018-01-11

## 2018-03-27 LAB
ANION GAP SERPL CALCULATED.3IONS-SCNC: 16 MMOL/L (ref 3–16)
BUN BLDV-MCNC: 61 MG/DL (ref 7–20)
CALCIUM SERPL-MCNC: 9.9 MG/DL (ref 8.3–10.6)
CHLORIDE BLD-SCNC: 90 MMOL/L (ref 99–110)
CO2: 28 MMOL/L (ref 21–32)
CREAT SERPL-MCNC: 1.8 MG/DL (ref 0.6–1.1)
GFR AFRICAN AMERICAN: 36
GFR NON-AFRICAN AMERICAN: 30
GLUCOSE BLD-MCNC: 231 MG/DL (ref 70–99)
POTASSIUM SERPL-SCNC: 4.5 MMOL/L (ref 3.5–5.1)
PRO-BNP: 114 PG/ML (ref 0–124)
SODIUM BLD-SCNC: 134 MMOL/L (ref 136–145)

## 2018-03-28 ENCOUNTER — OFFICE VISIT (OUTPATIENT)
Dept: CARDIOLOGY CLINIC | Age: 48
End: 2018-03-28

## 2018-03-28 ENCOUNTER — TELEPHONE (OUTPATIENT)
Dept: CARDIOLOGY CLINIC | Age: 48
End: 2018-03-28

## 2018-03-28 VITALS
SYSTOLIC BLOOD PRESSURE: 96 MMHG | DIASTOLIC BLOOD PRESSURE: 62 MMHG | HEIGHT: 64 IN | WEIGHT: 188 LBS | BODY MASS INDEX: 32.1 KG/M2 | HEART RATE: 84 BPM

## 2018-03-28 DIAGNOSIS — R06.02 SOB (SHORTNESS OF BREATH): ICD-10-CM

## 2018-03-28 DIAGNOSIS — N28.9 RENAL INSUFFICIENCY: ICD-10-CM

## 2018-03-28 DIAGNOSIS — I42.9 CARDIOMYOPATHY, UNSPECIFIED TYPE (HCC): ICD-10-CM

## 2018-03-28 DIAGNOSIS — I50.30 (HFPEF) HEART FAILURE WITH PRESERVED EJECTION FRACTION (HCC): Primary | ICD-10-CM

## 2018-03-28 DIAGNOSIS — I25.10 CORONARY ARTERY DISEASE INVOLVING NATIVE CORONARY ARTERY OF NATIVE HEART WITHOUT ANGINA PECTORIS: ICD-10-CM

## 2018-03-28 PROCEDURE — G8427 DOCREV CUR MEDS BY ELIG CLIN: HCPCS | Performed by: INTERNAL MEDICINE

## 2018-03-28 PROCEDURE — G8598 ASA/ANTIPLAT THER USED: HCPCS | Performed by: INTERNAL MEDICINE

## 2018-03-28 PROCEDURE — 99215 OFFICE O/P EST HI 40 MIN: CPT | Performed by: INTERNAL MEDICINE

## 2018-03-28 PROCEDURE — 1036F TOBACCO NON-USER: CPT | Performed by: INTERNAL MEDICINE

## 2018-03-28 PROCEDURE — G8417 CALC BMI ABV UP PARAM F/U: HCPCS | Performed by: INTERNAL MEDICINE

## 2018-03-28 PROCEDURE — 1111F DSCHRG MED/CURRENT MED MERGE: CPT | Performed by: INTERNAL MEDICINE

## 2018-03-28 PROCEDURE — G8484 FLU IMMUNIZE NO ADMIN: HCPCS | Performed by: INTERNAL MEDICINE

## 2018-03-28 NOTE — LETTER
[address] [place] [address] Cardiology [place] [address] [address] [address]  Phone: [phone]  Fax: [phone]    Isabelle Walker MD        March 29, 2018     Monica Bradshaw CNP  [id number]    Patient: Grant Champion  MR Number: [id number]  YOB: 1970  Date of Visit: 3/28/2018      [address]  Advanced CHF/Pulmonary Hypertension   Cardiac Follow up       Grant Champion  YOB: 1970    Date of Visit:  3/28/18    Chief Complaint   Patient presents with    Congestive Heart Failure     Weight gain after hospital       History of Present Illness:  Grant Champion is a 51 y.o. female with PMH significant for CAD, CVA/TIA, CKD, COPD/Asthma, type 2 DM with nephropathy, Breast Ca s/p left mastectomy with Fibromyalgia  Her cardiac cath in 2014 showing diffuse LAD disease and small vessel disease with normal RCA and LCX (treated medically).  Breast cancer 8 years ago s/p mastectomy. She has had issues with fluid overload but multiple echos have shown normal EF and diastolic function. Cardiac MRI performed showed normal LVEF 61% and no evidence of constriction. She was hospitalized 1/12-1/19/18 after presenting to hospital with increased SOB, orthopnea, PND and weight gain of 21 pounds in previous past week. CXR showed mild pulmonary edema, proBNP 277. VQ neg for PE and flu negative. She was treated for exacerbation of asthma and HFpEF. Ten years ago she had radiation for breast cancer. She had a LHC/RHC  2/28/18 by Dr. Rickey Dominique to r/o pericardial constriction as cause for her frequent episodes of fluid overload due to her history of radiation therapy to left breast. Also was looking for restriction that could cause fluid build up as well. No evidence found. Coronaries show small vessel CAD due to diabetes. No constriction or restriction.   PCWP is about 21 and normal. urinating as much. She decreased metoprolol to 25 mg daily. Potassium levels are good. Takign oxycodone for pain, still has pain in left arm, it feels as if it is a sleep most of the time. Tamoxifen is prescribed by Dr. Judy Ochoa. She has N visits three times a week and draws labs weekly. She tells me her weight today at home was 187 pounds. Her baseline weight is 184 pounds. Allergies   Allergen Reactions    Iv Dye [Iodides] Anaphylaxis     allergic to ct scan dye, not the MRI    Trazodone And Nefazodone Other (See Comments)     Makes patient feel very sluggish     Current Outpatient Prescriptions   Medication Sig Dispense Refill    lactobacillus (CULTURELLE) capsule Take 1 capsule by mouth daily (with breakfast) for 7 days 7 capsule 0    metoprolol tartrate (LOPRESSOR) 50 MG tablet Take 0.5 tablets by mouth 2 times daily 180 tablet 1    levofloxacin (LEVAQUIN) 750 MG tablet Take 1 tablet by mouth daily for 10 days 7 tablet 0    metolazone (ZAROXOLYN) 2.5 MG tablet Take 1 tablet by mouth Twice a Week Take 1 tablet daily as needed for weight gain. 30 tablet 0    torsemide (DEMADEX) 100 MG tablet Take 1 tablet by mouth daily 60 tablet 2    spironolactone (ALDACTONE) 25 MG tablet Take 1 tablet by mouth 2 times daily 60 tablet 3    oxyCODONE (OXYCONTIN) 20 MG extended release tablet Take 20 mg by mouth every 12 hours.       levothyroxine (SYNTHROID) 137 MCG tablet Take 1 tablet by mouth Daily 30 tablet 3    formoterol (PERFOROMIST) 20 MCG/2ML nebulizer solution Take 2 mLs by nebulization 2 times daily Dx: COPD   ICD-10: J44.9 120 mL 3    budesonide (PULMICORT) 0.5 MG/2ML nebulizer suspension Take 2 mLs by nebulization 2 times daily Dx: COPD   ICD-10: J44.9 60 ampule 3    NOVOLOG FLEXPEN 100 UNIT/ML injection pen Sliding scale      simvastatin (ZOCOR) 20 MG tablet Take 1 tablet by mouth nightly 90 tablet 3    insulin glargine (LANTUS) 100 UNIT/ML injection pen Inject 50 Units into the skin 2 times daily 5 pen 3    polyethylene glycol (GLYCOLAX) powder Take 17 g by mouth nightly       aspirin 81 MG EC tablet Take 81 mg by mouth daily      omeprazole (PRILOSEC) 20 MG delayed release capsule Take 20 mg by mouth 2 times daily      sulfaSALAzine (AZULFIDINE) 500 MG tablet Take 500 mg by mouth 2 times daily      calcium carbonate-vitamin D (CALCIUM 600+D) 600-200 MG-UNIT TABS Take 1 tablet by mouth 2 times daily      benztropine (COGENTIN) 1 MG tablet Take 1 mg by mouth nightly      metFORMIN (GLUCOPHAGE-XR) 500 MG extended release tablet Take 500 mg by mouth 2 times daily (with meals)      cyclobenzaprine (FLEXERIL) 10 MG tablet Take 10 mg by mouth 3 times daily as needed for Muscle spasms      Cholecalciferol (VITAMIN D) 2000 units CAPS capsule Take 1 capsule by mouth daily (with breakfast)      glimepiride (AMARYL) 4 MG tablet Take 4 mg by mouth every morning (before breakfast)      hydroxychloroquine (PLAQUENIL) 200 MG tablet Take 200 mg by mouth 2 times daily      ferrous sulfate 325 (65 Fe) MG tablet Take 325 mg by mouth 2 times daily (with meals)      sennosides-docusate sodium (SENOKOT-S) 8.6-50 MG tablet Take 2 tablets by mouth 2 times daily       folic acid (FOLVITE) 1 MG tablet Take 1 mg by mouth daily      lurasidone (LATUDA) 40 MG TABS tablet Take 40 mg by mouth Daily with supper      leflunomide (ARAVA) 20 MG tablet Take 20 mg by mouth daily      albuterol (ACCUNEB) 1.25 MG/3ML nebulizer solution Inhale 1 ampule into the lungs every 6 hours as needed for Wheezing      albuterol sulfate  (90 Base) MCG/ACT inhaler Inhale 2 puffs into the lungs every 6 hours as needed for Wheezing      mometasone-formoterol (DULERA) 200-5 MCG/ACT inhaler Inhale 2 puffs into the lungs every 12 hours      omega-3 acid ethyl esters (LOVAZA) 1 g capsule Take 2 g by mouth 2 times daily       loratadine (CLARITIN) 10 MG tablet Take 10 mg by mouth daily as needed · Musculoskeletal:  No gait disturbance, weakness or joint complaints. · Integumentary: No rash or pruritis. · Neurological: No headache, diplopia, change in muscle strength, numbness or tingling. No change in gait, balance, coordination, mood, affect, memory, mentation, behavior. · Psychiatric: No anxiety, no depression. · Endocrine: No malaise, fatigue or temperature intolerance. No excessive thirst, fluid intake, or urination. No tremor. · Hematologic/Lymphatic: No abnormal bruising or bleeding, blood clots or swollen lymph nodes. · Allergic/Immunologic: No nasal congestion or hives. Physical Examination:    BP 96/62   Pulse 84   Ht 5' 4\" (1.626 m)   Wt 188 lb (85.3 kg)   BMI 32.27 kg/m²     Baseline Low but stable. Wt Readings from Last 3 Encounters:   03/28/18 188 lb (85.3 kg)   03/25/18 184 lb 8 oz (83.7 kg)   03/08/18 204 lb (92.5 kg)     BP Readings from Last 3 Encounters:   03/28/18 96/62   03/25/18 107/67   03/13/18 98/62     Constitutional and General Appearance:   WD/WN who is looking much more euvolemic  HEENT:  NC/AT  XANDER  No problems with hearing  Skin:  Warm, dry  Respiratory:  · Normal excursion and expansion without use of accessory muscles  · Resp Auscultation: Normal breath sounds without dullness  Cardiovascular:  · The apical impulses not displaced  · Heart tones are crisp and normal  · Cervical veins are not engorged  · The carotid upstroke is normal in amplitude and contour without delay or bruit  · JVP 12-13 cm H2O  RRR with nl S1 and S2 without m,r,g  · Peripheral pulses are symmetrical and full  · There is no clubbing, cyanosis of the extremities.   · Trace bilateral edema, improved  · Femoral Arteries: 2+ and equal  · Pedal Pulses: 2+ and equal   Neck:  · No thyromegaly  Abdomen:  abdominal girth soft  · No masses or tenderness  · Liver/Spleen: No Abnormalities Noted  Neurological/Psychiatric:  · Alert and oriented in all spheres  · Moves all extremities well

## 2018-03-28 NOTE — TELEPHONE ENCOUNTER
Spoke with Maria Elena Renteria, Supervisor at [facility]  Per ARETHA instructions:  Pt is to receive another blood draw early on Friday of this week: 3/30/18  Pt is to receive blood work twice a week for the next two weeks. Pt will be seen weekly in office for a while.

## 2018-03-28 NOTE — PROGRESS NOTES
[facility]  Advanced CHF/Pulmonary Hypertension   Cardiac Follow up       Lucio Mackey  YOB: 1970    Date of Visit:  3/28/18    Chief Complaint   Patient presents with    Congestive Heart Failure     Weight gain after hospital       History of Present Illness:  Lucio Mackey is a 51 y.o. female with PMH significant for CAD, CVA/TIA, CKD, COPD/Asthma, type 2 DM with nephropathy, Breast Ca s/p left mastectomy with Fibromyalgia  Her cardiac cath in 2014 showing diffuse LAD disease and small vessel disease with normal RCA and LCX (treated medically).  Breast cancer 8 years ago s/p mastectomy. She has had issues with fluid overload but multiple echos have shown normal EF and diastolic function. Cardiac MRI performed showed normal LVEF 61% and no evidence of constriction. She was hospitalized 1/12-1/19/18 after presenting to hospital with increased SOB, orthopnea, PND and weight gain of 21 pounds in previous past week. CXR showed mild pulmonary edema, proBNP 277. VQ neg for PE and flu negative. She was treated for exacerbation of asthma and HFpEF. Ten years ago she had radiation for breast cancer. She had a LHC/RHC  2/28/18 by Dr. Shalini Mccullough to r/o pericardial constriction as cause for her frequent episodes of fluid overload due to her history of radiation therapy to left breast. Also was looking for restriction that could cause fluid build up as well. No evidence found. Coronaries show small vessel CAD due to diabetes. No constriction or restriction. PCWP is about 21 and normal.    On 3/13/2018 she was admitted to [facility] from infusion center with fluid overload for decompensated HF. Hospital course included her receiving lasix gtt for diuresis. She is complicated by acute on chronic renal failure likely due to CHF and hypoperfusion. She is on IV diuresis. Renal panel has improved and is stable. Recent ECHO with pEF. She complained of dysuria and UA with cultures revealed ESBL Klebsiella.  She Reactions    Iv Dye [Iodides] Anaphylaxis     allergic to ct scan dye, not the MRI    Trazodone And Nefazodone Other (See Comments)     Makes patient feel very sluggish     Current Outpatient Prescriptions   Medication Sig Dispense Refill    lactobacillus (CULTURELLE) capsule Take 1 capsule by mouth daily (with breakfast) for 7 days 7 capsule 0    metoprolol tartrate (LOPRESSOR) 50 MG tablet Take 0.5 tablets by mouth 2 times daily 180 tablet 1    levofloxacin (LEVAQUIN) 750 MG tablet Take 1 tablet by mouth daily for 10 days 7 tablet 0    metolazone (ZAROXOLYN) 2.5 MG tablet Take 1 tablet by mouth Twice a Week Take 1 tablet daily as needed for weight gain. 30 tablet 0    torsemide (DEMADEX) 100 MG tablet Take 1 tablet by mouth daily 60 tablet 2    spironolactone (ALDACTONE) 25 MG tablet Take 1 tablet by mouth 2 times daily 60 tablet 3    oxyCODONE (OXYCONTIN) 20 MG extended release tablet Take 20 mg by mouth every 12 hours.       levothyroxine (SYNTHROID) 137 MCG tablet Take 1 tablet by mouth Daily 30 tablet 3    formoterol (PERFOROMIST) 20 MCG/2ML nebulizer solution Take 2 mLs by nebulization 2 times daily Dx: COPD   ICD-10: J44.9 120 mL 3    budesonide (PULMICORT) 0.5 MG/2ML nebulizer suspension Take 2 mLs by nebulization 2 times daily Dx: COPD   ICD-10: J44.9 60 ampule 3    NOVOLOG FLEXPEN 100 UNIT/ML injection pen Sliding scale      simvastatin (ZOCOR) 20 MG tablet Take 1 tablet by mouth nightly 90 tablet 3    insulin glargine (LANTUS) 100 UNIT/ML injection pen Inject 50 Units into the skin 2 times daily 5 pen 3    polyethylene glycol (GLYCOLAX) powder Take 17 g by mouth nightly       aspirin 81 MG EC tablet Take 81 mg by mouth daily      omeprazole (PRILOSEC) 20 MG delayed release capsule Take 20 mg by mouth 2 times daily      sulfaSALAzine (AZULFIDINE) 500 MG tablet Take 500 mg by mouth 2 times daily      calcium carbonate-vitamin D (CALCIUM 600+D) 600-200 MG-UNIT TABS Take 1 tablet by mouth times daily .  LYRICA 50 MG capsule TAKE 1 CAPSULE BY MOUTH TWICE DAILY (Patient taking differently: Take 25mg in the Morning and 75 mg at night.) 60 capsule 0    diclofenac sodium (VOLTAREN) 1 % GEL Apply 2 g topically 4 times daily as needed. 100 g 4    montelukast (SINGULAIR) 10 MG tablet Take 10 mg by mouth daily.  duloxetine (CYMBALTA) 60 MG capsule Take 60 mg by mouth 2 times daily.  lubiprostone (AMITIZA) 24 MCG capsule Take 24 mcg by mouth 2 times daily (with meals).  Elastic Bandages & Supports (FUTURO SUPPORT GLOVE MEDIUM) MISC 1 ready made support glove 1 each 2    Compression Sleeve MISC by Does not apply route 1 sleeve, 20-30 mmHg 1 each 2     No current facility-administered medications for this visit.         Past Medical History:   Diagnosis Date    Anxiety     Anxiety and depression     Arthritis     not sure of specific type    Asthma     CAD (coronary artery disease)     Cancer ([facility] 2007    left breast    Cerebral artery occlusion with cerebral infarction ([facility]     Chronic kidney disease     renal insufficency/to see Dr Soy Jimenez    Chronic pain     Constipation     COPD (chronic obstructive pulmonary disease) ([facility]     Depression     Diabetes mellitus ([facility]     Diabetic polyneuropathy associated with type 2 diabetes mellitus ([facility] 2/2/2018    Dysthymia 2/2/2018    ESBL (extended spectrum beta-lactamase) producing bacteria infection 03/14/2018    urine    Fibromyalgia     Gastric ulcer, unspecified as acute or chronic, without mention of hemorrhage, perforation, or obstruction     GERD (gastroesophageal reflux disease)     HIGH CHOLESTEROL     Hypertension     Hypothyroidism     Severe persistent asthma without complication 0/7/1929    Thyroid disease     TIA (transient ischemic attack)     with occasional left leg and hand weakness     Past Surgical History:   Procedure Laterality Date    BREAST SURGERY      left mastectomy    CARPAL blood clots or swollen lymph nodes. · Allergic/Immunologic: No nasal congestion or hives. Physical Examination:    BP 96/62   Pulse 84   Ht 5' 4\" (1.626 m)   Wt 188 lb (85.3 kg)   BMI 32.27 kg/m²    Baseline Low but stable. Wt Readings from Last 3 Encounters:   03/28/18 188 lb (85.3 kg)   03/25/18 184 lb 8 oz (83.7 kg)   03/08/18 204 lb (92.5 kg)     BP Readings from Last 3 Encounters:   03/28/18 96/62   03/25/18 107/67   03/13/18 98/62     Constitutional and General Appearance:   WD/WN who is looking much more euvolemic  HEENT:  NC/AT  XANDER  No problems with hearing  Skin:  Warm, dry  Respiratory:  · Normal excursion and expansion without use of accessory muscles  · Resp Auscultation: Normal breath sounds without dullness  Cardiovascular:  · The apical impulses not displaced  · Heart tones are crisp and normal  · Cervical veins are not engorged  · The carotid upstroke is normal in amplitude and contour without delay or bruit  · JVP 12-13 cm H2O  RRR with nl S1 and S2 without m,r,g  · Peripheral pulses are symmetrical and full  · There is no clubbing, cyanosis of the extremities. · Trace bilateral edema, improved  · Femoral Arteries: 2+ and equal  · Pedal Pulses: 2+ and equal   Neck:  · No thyromegaly  Abdomen:  abdominal girth soft  · No masses or tenderness  · Liver/Spleen: No Abnormalities Noted  Neurological/Psychiatric:  · Alert and oriented in all spheres  · Moves all extremities well  · Exhibits normal gait balance and coordination  · No abnormalities of mood, affect, memory, mentation, or behavior are noted  Diagnostics:     CXR 1/16/18  No acute cardiopulmonary disease     Echo 11/8/2017:  Normal left ventricle size and systolic function with an estimated ejection fraction of 60%. No regional wall motion abnormalities are seen. There is borderline concentric left ventricular hypertrophy.   E/e\"= 13     Stress test 11/8/2017:   There is normal isotope uptake at stress and rest. There is no evidence of  myocardial ischemia or scar.  Normal LV function.  Overall findings represent a low risk scan.       VQ scan 1/11/2018:  Normal study.  No evidence of pulmonary embolus.     Assessment:  1. (HFpEF) heart failure with preserved ejection fraction. Appears volume overloaded considering edema and stable weight today. Acute on chronic diastolic HF   2. Coronary artery disease/chest pain. Cause undetermined. [facility] 2014 with diffuse LAD disease and small vessel. No ischemia on stress test 11/2017.     3. Essential hypertension. Controlled. Under age 60, goal BP <140/90.    4. Shortness of Breath (SOB)--on exertion; no worse, no better. Will get IV lasix today. Plan:  Increase spironolactone from 50 mg daily to 50 mg in the mornings and 25 mg in the evenings or may take 75 mg in the mornings. No metolazone unless weight gets above 190 pounds then take 2.5 mg daily. Take decreased dose of torsemide tomorrow 50 mg daily then can go back to 100 mg daily. Daily weights first thing in the mornings. Labs on Friday and then twice a week. (Tues and Friday)  Follow up on 4/4 at 11:00 with CULLEN NP. Follow up on 4/11 with me at 11:45. I spent 50 minutes today with patient and , examining patient, developing plan of care, and arranging for patient to receive acute treatment with IV lasix infusion. I appreciate the opportunity of cooperating in the care of this patient. Gwendolyn Woodson M.D., [address] [facility]  1. Tobacco Cessation Counseling: NA  2. Retake of BP if >140/90: NA  3. Documentation to PCP/referring for new patient:  Sent to PCP at close of office visit  4. CAD patient on anti-platelet:   5. CAD patient on STATIN therapy:    6.  Patient with CHF and aFib on anticoagulation:

## 2018-03-30 ENCOUNTER — TELEPHONE (OUTPATIENT)
Dept: CARDIOLOGY CLINIC | Age: 48
End: 2018-03-30

## 2018-03-30 LAB
ANION GAP SERPL CALCULATED.3IONS-SCNC: 14 MMOL/L (ref 3–16)
BUN BLDV-MCNC: 53 MG/DL (ref 7–20)
CALCIUM SERPL-MCNC: 9.7 MG/DL (ref 8.3–10.6)
CHLORIDE BLD-SCNC: 93 MMOL/L (ref 99–110)
CO2: 28 MMOL/L (ref 21–32)
CREAT SERPL-MCNC: 1.2 MG/DL (ref 0.6–1.1)
GFR AFRICAN AMERICAN: 58
GFR NON-AFRICAN AMERICAN: 48
GLUCOSE BLD-MCNC: 274 MG/DL (ref 70–99)
POTASSIUM SERPL-SCNC: 4.4 MMOL/L (ref 3.5–5.1)
PRO-BNP: 110 PG/ML (ref 0–124)
SODIUM BLD-SCNC: 135 MMOL/L (ref 136–145)

## 2018-03-30 NOTE — COMMUNICATION BODY
Reactions    Iv Dye [Iodides] Anaphylaxis     allergic to ct scan dye, not the MRI    Trazodone And Nefazodone Other (See Comments)     Makes patient feel very sluggish     Current Outpatient Prescriptions   Medication Sig Dispense Refill    lactobacillus (CULTURELLE) capsule Take 1 capsule by mouth daily (with breakfast) for 7 days 7 capsule 0    metoprolol tartrate (LOPRESSOR) 50 MG tablet Take 0.5 tablets by mouth 2 times daily 180 tablet 1    levofloxacin (LEVAQUIN) 750 MG tablet Take 1 tablet by mouth daily for 10 days 7 tablet 0    metolazone (ZAROXOLYN) 2.5 MG tablet Take 1 tablet by mouth Twice a Week Take 1 tablet daily as needed for weight gain. 30 tablet 0    torsemide (DEMADEX) 100 MG tablet Take 1 tablet by mouth daily 60 tablet 2    spironolactone (ALDACTONE) 25 MG tablet Take 1 tablet by mouth 2 times daily 60 tablet 3    oxyCODONE (OXYCONTIN) 20 MG extended release tablet Take 20 mg by mouth every 12 hours.       levothyroxine (SYNTHROID) 137 MCG tablet Take 1 tablet by mouth Daily 30 tablet 3    formoterol (PERFOROMIST) 20 MCG/2ML nebulizer solution Take 2 mLs by nebulization 2 times daily Dx: COPD   ICD-10: J44.9 120 mL 3    budesonide (PULMICORT) 0.5 MG/2ML nebulizer suspension Take 2 mLs by nebulization 2 times daily Dx: COPD   ICD-10: J44.9 60 ampule 3    NOVOLOG FLEXPEN 100 UNIT/ML injection pen Sliding scale      simvastatin (ZOCOR) 20 MG tablet Take 1 tablet by mouth nightly 90 tablet 3    insulin glargine (LANTUS) 100 UNIT/ML injection pen Inject 50 Units into the skin 2 times daily 5 pen 3    polyethylene glycol (GLYCOLAX) powder Take 17 g by mouth nightly       aspirin 81 MG EC tablet Take 81 mg by mouth daily      omeprazole (PRILOSEC) 20 MG delayed release capsule Take 20 mg by mouth 2 times daily      sulfaSALAzine (AZULFIDINE) 500 MG tablet Take 500 mg by mouth 2 times daily      calcium carbonate-vitamin D (CALCIUM 600+D) 600-200 MG-UNIT TABS Take 1 tablet by mouth 2 times daily      benztropine (COGENTIN) 1 MG tablet Take 1 mg by mouth nightly      metFORMIN (GLUCOPHAGE-XR) 500 MG extended release tablet Take 500 mg by mouth 2 times daily (with meals)      cyclobenzaprine (FLEXERIL) 10 MG tablet Take 10 mg by mouth 3 times daily as needed for Muscle spasms      Cholecalciferol (VITAMIN D) 2000 units CAPS capsule Take 1 capsule by mouth daily (with breakfast)      glimepiride (AMARYL) 4 MG tablet Take 4 mg by mouth every morning (before breakfast)      hydroxychloroquine (PLAQUENIL) 200 MG tablet Take 200 mg by mouth 2 times daily      ferrous sulfate 325 (65 Fe) MG tablet Take 325 mg by mouth 2 times daily (with meals)      sennosides-docusate sodium (SENOKOT-S) 8.6-50 MG tablet Take 2 tablets by mouth 2 times daily       folic acid (FOLVITE) 1 MG tablet Take 1 mg by mouth daily      lurasidone (LATUDA) 40 MG TABS tablet Take 40 mg by mouth Daily with supper      leflunomide (ARAVA) 20 MG tablet Take 20 mg by mouth daily      albuterol (ACCUNEB) 1.25 MG/3ML nebulizer solution Inhale 1 ampule into the lungs every 6 hours as needed for Wheezing      albuterol sulfate  (90 Base) MCG/ACT inhaler Inhale 2 puffs into the lungs every 6 hours as needed for Wheezing      mometasone-formoterol (DULERA) 200-5 MCG/ACT inhaler Inhale 2 puffs into the lungs every 12 hours      omega-3 acid ethyl esters (LOVAZA) 1 g capsule Take 2 g by mouth 2 times daily       loratadine (CLARITIN) 10 MG tablet Take 10 mg by mouth daily as needed       fluticasone (FLONASE) 50 MCG/ACT nasal spray 1 spray by Nasal route daily 1 Bottle 0    oxyCODONE-acetaminophen (PERCOCET)  MG per tablet Take 1 tablet by mouth every 4 hours as needed for Pain .  Earliest Fill Date: 6/8/17 100 tablet 0    tamoxifen (NOLVADEX) 20 MG tablet Take 1 tablet by mouth daily (Patient taking differently: Take 20 mg by mouth nightly ) 90 tablet 6    ALPRAZolam (XANAX) 1 MG tablet Take 1 mg by mouth 2

## 2018-04-01 ENCOUNTER — HOSPITAL ENCOUNTER (OUTPATIENT)
Dept: OTHER | Age: 48
Discharge: HOME | End: 2018-04-30
Attending: INTERNAL MEDICINE | Admitting: INTERNAL MEDICINE

## 2018-04-01 ENCOUNTER — HOSPITAL ENCOUNTER (OUTPATIENT)
Dept: ONCOLOGY | Age: 48
Discharge: HOME | End: 2018-04-30
Attending: INTERNAL MEDICINE | Admitting: INTERNAL MEDICINE

## 2018-04-02 ENCOUNTER — TELEPHONE (OUTPATIENT)
Dept: CARDIOLOGY CLINIC | Age: 48
End: 2018-04-02

## 2018-04-02 LAB
ANION GAP SERPL CALCULATED.3IONS-SCNC: 15 MMOL/L (ref 3–16)
BUN BLDV-MCNC: 57 MG/DL (ref 7–20)
CALCIUM SERPL-MCNC: 9 MG/DL (ref 8.3–10.6)
CHLORIDE BLD-SCNC: 92 MMOL/L (ref 99–110)
CO2: 27 MMOL/L (ref 21–32)
CREAT SERPL-MCNC: 2.3 MG/DL (ref 0.6–1.1)
GFR AFRICAN AMERICAN: 27
GFR NON-AFRICAN AMERICAN: 23
GLUCOSE BLD-MCNC: 284 MG/DL (ref 70–99)
POTASSIUM SERPL-SCNC: 4.6 MMOL/L (ref 3.5–5.1)
PRO-BNP: 339 PG/ML (ref 0–124)
SODIUM BLD-SCNC: 134 MMOL/L (ref 136–145)

## 2018-04-04 ENCOUNTER — OFFICE VISIT (OUTPATIENT)
Dept: CARDIOLOGY CLINIC | Age: 48
End: 2018-04-04

## 2018-04-04 ENCOUNTER — HOSPITAL ENCOUNTER (OUTPATIENT)
Dept: ONCOLOGY | Age: 48
Discharge: HOME | End: 2018-04-05
Admitting: INTERNAL MEDICINE

## 2018-04-04 VITALS
HEIGHT: 64 IN | OXYGEN SATURATION: 98 % | HEART RATE: 86 BPM | WEIGHT: 182.4 LBS | SYSTOLIC BLOOD PRESSURE: 110 MMHG | DIASTOLIC BLOOD PRESSURE: 60 MMHG | RESPIRATION RATE: 16 BRPM | BODY MASS INDEX: 31.14 KG/M2

## 2018-04-04 DIAGNOSIS — E86.0 DEHYDRATION: ICD-10-CM

## 2018-04-04 DIAGNOSIS — I25.83 CORONARY ARTERY DISEASE DUE TO LIPID RICH PLAQUE: Chronic | ICD-10-CM

## 2018-04-04 DIAGNOSIS — R06.02 SOB (SHORTNESS OF BREATH): Chronic | ICD-10-CM

## 2018-04-04 DIAGNOSIS — I50.30 (HFPEF) HEART FAILURE WITH PRESERVED EJECTION FRACTION (HCC): Primary | Chronic | ICD-10-CM

## 2018-04-04 DIAGNOSIS — I25.10 CORONARY ARTERY DISEASE DUE TO LIPID RICH PLAQUE: Chronic | ICD-10-CM

## 2018-04-04 DIAGNOSIS — N28.9 RENAL INSUFFICIENCY: Chronic | ICD-10-CM

## 2018-04-04 LAB
ANION GAP SERPL CALCULATED.3IONS-SCNC: 12 MMOL/L (ref 3–16)
BUN BLDV-MCNC: 45 MG/DL (ref 7–20)
CALCIUM SERPL-MCNC: 9.4 MG/DL (ref 8.3–10.6)
CHLORIDE BLD-SCNC: 96 MMOL/L (ref 99–110)
CO2: 28 MMOL/L (ref 21–32)
CREAT SERPL-MCNC: 1.2 MG/DL (ref 0.6–1.1)
GFR AFRICAN AMERICAN: 58
GFR NON-AFRICAN AMERICAN: 48
GLUCOSE BLD-MCNC: 301 MG/DL (ref 70–99)
POTASSIUM SERPL-SCNC: 4.6 MMOL/L (ref 3.5–5.1)
PRO-BNP: 344 PG/ML (ref 0–124)
SODIUM BLD-SCNC: 136 MMOL/L (ref 136–145)

## 2018-04-04 PROCEDURE — 1111F DSCHRG MED/CURRENT MED MERGE: CPT | Performed by: CLINICAL NURSE SPECIALIST

## 2018-04-04 PROCEDURE — G8417 CALC BMI ABV UP PARAM F/U: HCPCS | Performed by: CLINICAL NURSE SPECIALIST

## 2018-04-04 PROCEDURE — 1036F TOBACCO NON-USER: CPT | Performed by: CLINICAL NURSE SPECIALIST

## 2018-04-04 PROCEDURE — 99214 OFFICE O/P EST MOD 30 MIN: CPT | Performed by: CLINICAL NURSE SPECIALIST

## 2018-04-04 PROCEDURE — G8598 ASA/ANTIPLAT THER USED: HCPCS | Performed by: CLINICAL NURSE SPECIALIST

## 2018-04-04 PROCEDURE — G8427 DOCREV CUR MEDS BY ELIG CLIN: HCPCS | Performed by: CLINICAL NURSE SPECIALIST

## 2018-04-06 ENCOUNTER — TELEPHONE (OUTPATIENT)
Dept: CARDIOLOGY CLINIC | Age: 48
End: 2018-04-06

## 2018-04-06 LAB
ANION GAP SERPL CALCULATED.3IONS-SCNC: 8 MMOL/L (ref 3–16)
BUN BLDV-MCNC: 27 MG/DL (ref 7–20)
CALCIUM SERPL-MCNC: 8.8 MG/DL (ref 8.3–10.6)
CHLORIDE BLD-SCNC: 99 MMOL/L (ref 99–110)
CO2: 33 MMOL/L (ref 21–32)
CREAT SERPL-MCNC: 0.9 MG/DL (ref 0.6–1.1)
GFR AFRICAN AMERICAN: >60
GFR NON-AFRICAN AMERICAN: >60
GLUCOSE BLD-MCNC: 283 MG/DL (ref 70–99)
POTASSIUM SERPL-SCNC: 4.4 MMOL/L (ref 3.5–5.1)
PRO-BNP: 557 PG/ML (ref 0–124)
SODIUM BLD-SCNC: 140 MMOL/L (ref 136–145)

## 2018-04-09 LAB
ANION GAP SERPL CALCULATED.3IONS-SCNC: 14 MMOL/L (ref 3–16)
BUN BLDV-MCNC: 36 MG/DL (ref 7–20)
CALCIUM SERPL-MCNC: 9.8 MG/DL (ref 8.3–10.6)
CHLORIDE BLD-SCNC: 91 MMOL/L (ref 99–110)
CO2: 32 MMOL/L (ref 21–32)
CREAT SERPL-MCNC: 1.8 MG/DL (ref 0.6–1.1)
GFR AFRICAN AMERICAN: 36
GFR NON-AFRICAN AMERICAN: 30
GLUCOSE BLD-MCNC: 308 MG/DL (ref 70–99)
POTASSIUM SERPL-SCNC: 4.2 MMOL/L (ref 3.5–5.1)
PRO-BNP: 143 PG/ML (ref 0–124)
SODIUM BLD-SCNC: 137 MMOL/L (ref 136–145)

## 2018-04-10 ENCOUNTER — OFFICE VISIT (OUTPATIENT)
Dept: INTERNAL MEDICINE CLINIC | Age: 48
End: 2018-04-10

## 2018-04-10 VITALS
BODY MASS INDEX: 34.78 KG/M2 | SYSTOLIC BLOOD PRESSURE: 100 MMHG | WEIGHT: 184.2 LBS | OXYGEN SATURATION: 99 % | HEIGHT: 61 IN | HEART RATE: 84 BPM | DIASTOLIC BLOOD PRESSURE: 60 MMHG | RESPIRATION RATE: 12 BRPM

## 2018-04-10 DIAGNOSIS — N28.9 RENAL INSUFFICIENCY: Chronic | ICD-10-CM

## 2018-04-10 DIAGNOSIS — I89.0 LYMPHEDEMA OF UPPER EXTREMITY FOLLOWING LYMPHADENECTOMY: ICD-10-CM

## 2018-04-10 DIAGNOSIS — N39.0 URINARY TRACT INFECTION DUE TO ESBL KLEBSIELLA: ICD-10-CM

## 2018-04-10 DIAGNOSIS — K21.9 GASTROESOPHAGEAL REFLUX DISEASE, ESOPHAGITIS PRESENCE NOT SPECIFIED: ICD-10-CM

## 2018-04-10 DIAGNOSIS — E03.9 HYPOTHYROIDISM, UNSPECIFIED TYPE: ICD-10-CM

## 2018-04-10 DIAGNOSIS — E11.42 DIABETIC POLYNEUROPATHY ASSOCIATED WITH TYPE 2 DIABETES MELLITUS (HCC): ICD-10-CM

## 2018-04-10 DIAGNOSIS — E11.65 POORLY CONTROLLED TYPE 2 DIABETES MELLITUS (HCC): ICD-10-CM

## 2018-04-10 DIAGNOSIS — G47.33 OSA (OBSTRUCTIVE SLEEP APNEA): ICD-10-CM

## 2018-04-10 DIAGNOSIS — G89.29 OTHER CHRONIC PAIN: Chronic | ICD-10-CM

## 2018-04-10 DIAGNOSIS — R13.12 OROPHARYNGEAL DYSPHAGIA: ICD-10-CM

## 2018-04-10 DIAGNOSIS — F41.9 ANXIETY AND DEPRESSION: ICD-10-CM

## 2018-04-10 DIAGNOSIS — Z85.3 HX OF BREAST CANCER: ICD-10-CM

## 2018-04-10 DIAGNOSIS — E78.5 HYPERLIPIDEMIA LDL GOAL <70: ICD-10-CM

## 2018-04-10 DIAGNOSIS — F32.A ANXIETY AND DEPRESSION: ICD-10-CM

## 2018-04-10 DIAGNOSIS — J45.50 SEVERE PERSISTENT ASTHMA WITHOUT COMPLICATION: ICD-10-CM

## 2018-04-10 DIAGNOSIS — E89.89 LYMPHEDEMA OF UPPER EXTREMITY FOLLOWING LYMPHADENECTOMY: ICD-10-CM

## 2018-04-10 DIAGNOSIS — Z71.89 COMPLEX CARE COORDINATION: Primary | ICD-10-CM

## 2018-04-10 DIAGNOSIS — I25.83 CORONARY ARTERY DISEASE DUE TO LIPID RICH PLAQUE: Chronic | ICD-10-CM

## 2018-04-10 DIAGNOSIS — I25.10 CORONARY ARTERY DISEASE DUE TO LIPID RICH PLAQUE: Chronic | ICD-10-CM

## 2018-04-10 DIAGNOSIS — I50.9 CHRONIC CONGESTIVE HEART FAILURE, UNSPECIFIED CONGESTIVE HEART FAILURE TYPE: ICD-10-CM

## 2018-04-10 DIAGNOSIS — M06.9 RHEUMATOID ARTHRITIS INVOLVING MULTIPLE SITES, UNSPECIFIED RHEUMATOID FACTOR PRESENCE: ICD-10-CM

## 2018-04-10 DIAGNOSIS — B96.89 URINARY TRACT INFECTION DUE TO ESBL KLEBSIELLA: ICD-10-CM

## 2018-04-10 DIAGNOSIS — K59.03 CONSTIPATION DUE TO PAIN MEDICATION: ICD-10-CM

## 2018-04-10 LAB
BILIRUBIN, POC: NORMAL
BLOOD URINE, POC: NORMAL
CLARITY, POC: NORMAL
COLOR, POC: NORMAL
GLUCOSE URINE, POC: NORMAL
KETONES, POC: NORMAL
LEUKOCYTE EST, POC: NORMAL
NITRITE, POC: NORMAL
PH, POC: 5.5
PROTEIN, POC: NORMAL
SPECIFIC GRAVITY, POC: 1.01
UROBILINOGEN, POC: 0.2

## 2018-04-10 PROCEDURE — 81002 URINALYSIS NONAUTO W/O SCOPE: CPT | Performed by: INTERNAL MEDICINE

## 2018-04-10 PROCEDURE — 99215 OFFICE O/P EST HI 40 MIN: CPT | Performed by: INTERNAL MEDICINE

## 2018-04-10 RX ORDER — GABAPENTIN 300 MG/1
300 CAPSULE ORAL 2 TIMES DAILY
COMMUNITY
End: 2018-08-02

## 2018-04-10 RX ORDER — SENNA AND DOCUSATE SODIUM 50; 8.6 MG/1; MG/1
2 TABLET, FILM COATED ORAL 2 TIMES DAILY
Qty: 120 TABLET | Refills: 3 | Status: SHIPPED | OUTPATIENT
Start: 2018-04-10 | End: 2018-08-12 | Stop reason: SDUPTHER

## 2018-04-10 RX ORDER — GABAPENTIN 300 MG/1
300 CAPSULE ORAL 2 TIMES DAILY
Qty: 60 CAPSULE | Refills: 3 | Status: CANCELLED | OUTPATIENT
Start: 2018-04-10 | End: 2018-05-10

## 2018-04-11 ENCOUNTER — TELEPHONE (OUTPATIENT)
Dept: INTERNAL MEDICINE CLINIC | Age: 48
End: 2018-04-11

## 2018-04-11 ENCOUNTER — TELEPHONE (OUTPATIENT)
Dept: CARDIOLOGY CLINIC | Age: 48
End: 2018-04-11

## 2018-04-11 PROBLEM — I50.32 CHRONIC DIASTOLIC CONGESTIVE HEART FAILURE (HCC): Status: ACTIVE | Noted: 2018-02-07

## 2018-04-11 PROBLEM — I50.9 CHRONIC CONGESTIVE HEART FAILURE (HCC): Status: ACTIVE | Noted: 2018-02-07

## 2018-04-12 ENCOUNTER — OFFICE VISIT (OUTPATIENT)
Dept: CARDIOLOGY CLINIC | Age: 48
End: 2018-04-12

## 2018-04-12 VITALS
HEART RATE: 84 BPM | DIASTOLIC BLOOD PRESSURE: 70 MMHG | SYSTOLIC BLOOD PRESSURE: 108 MMHG | BODY MASS INDEX: 36.58 KG/M2 | WEIGHT: 192 LBS

## 2018-04-12 DIAGNOSIS — I50.30 (HFPEF) HEART FAILURE WITH PRESERVED EJECTION FRACTION (HCC): Primary | Chronic | ICD-10-CM

## 2018-04-12 DIAGNOSIS — N28.9 RENAL INSUFFICIENCY: Chronic | ICD-10-CM

## 2018-04-12 DIAGNOSIS — I10 ESSENTIAL HYPERTENSION: ICD-10-CM

## 2018-04-12 DIAGNOSIS — I50.22 SYSTOLIC CHF, CHRONIC (HCC): ICD-10-CM

## 2018-04-12 DIAGNOSIS — R06.02 SOB (SHORTNESS OF BREATH): Chronic | ICD-10-CM

## 2018-04-12 DIAGNOSIS — I25.10 CORONARY ARTERY DISEASE INVOLVING NATIVE CORONARY ARTERY OF NATIVE HEART WITHOUT ANGINA PECTORIS: Chronic | ICD-10-CM

## 2018-04-12 LAB — URINE CULTURE, ROUTINE: NORMAL

## 2018-04-12 PROCEDURE — G8598 ASA/ANTIPLAT THER USED: HCPCS | Performed by: INTERNAL MEDICINE

## 2018-04-12 PROCEDURE — G8427 DOCREV CUR MEDS BY ELIG CLIN: HCPCS | Performed by: INTERNAL MEDICINE

## 2018-04-12 PROCEDURE — 99215 OFFICE O/P EST HI 40 MIN: CPT | Performed by: INTERNAL MEDICINE

## 2018-04-12 PROCEDURE — G8417 CALC BMI ABV UP PARAM F/U: HCPCS | Performed by: INTERNAL MEDICINE

## 2018-04-12 PROCEDURE — 1111F DSCHRG MED/CURRENT MED MERGE: CPT | Performed by: INTERNAL MEDICINE

## 2018-04-12 PROCEDURE — 1036F TOBACCO NON-USER: CPT | Performed by: INTERNAL MEDICINE

## 2018-04-12 RX ORDER — FUROSEMIDE 80 MG
80 TABLET ORAL 2 TIMES DAILY
Qty: 180 TABLET | Refills: 3 | Status: SHIPPED | OUTPATIENT
Start: 2018-04-12 | End: 2018-05-30 | Stop reason: HOSPADM

## 2018-04-12 RX ORDER — SPIRONOLACTONE 50 MG/1
50 TABLET, FILM COATED ORAL 2 TIMES DAILY
Qty: 180 TABLET | Refills: 3 | Status: SHIPPED | OUTPATIENT
Start: 2018-04-12 | End: 2018-05-30 | Stop reason: SDUPTHER

## 2018-04-13 ENCOUNTER — TELEPHONE (OUTPATIENT)
Dept: CARDIOLOGY CLINIC | Age: 48
End: 2018-04-13

## 2018-04-16 ENCOUNTER — TELEPHONE (OUTPATIENT)
Dept: CARDIOLOGY CLINIC | Age: 48
End: 2018-04-16

## 2018-04-16 ENCOUNTER — OFFICE VISIT (OUTPATIENT)
Dept: PULMONOLOGY | Age: 48
End: 2018-04-16

## 2018-04-16 VITALS
HEART RATE: 92 BPM | WEIGHT: 187 LBS | BODY MASS INDEX: 31.92 KG/M2 | OXYGEN SATURATION: 95 % | DIASTOLIC BLOOD PRESSURE: 79 MMHG | SYSTOLIC BLOOD PRESSURE: 132 MMHG | HEIGHT: 64 IN | RESPIRATION RATE: 18 BRPM

## 2018-04-16 DIAGNOSIS — J45.30 MILD PERSISTENT ASTHMA WITHOUT COMPLICATION: ICD-10-CM

## 2018-04-16 DIAGNOSIS — Z09 HOSPITAL DISCHARGE FOLLOW-UP: Primary | ICD-10-CM

## 2018-04-16 DIAGNOSIS — J96.11 CHRONIC RESPIRATORY FAILURE WITH HYPOXIA (HCC): ICD-10-CM

## 2018-04-16 LAB
ANION GAP SERPL CALCULATED.3IONS-SCNC: 11 MMOL/L (ref 3–16)
BUN BLDV-MCNC: 28 MG/DL (ref 7–20)
CALCIUM SERPL-MCNC: 8.9 MG/DL (ref 8.3–10.6)
CHLORIDE BLD-SCNC: 97 MMOL/L (ref 99–110)
CO2: 31 MMOL/L (ref 21–32)
CREAT SERPL-MCNC: 1.1 MG/DL (ref 0.6–1.1)
GFR AFRICAN AMERICAN: >60
GFR NON-AFRICAN AMERICAN: 53
GLUCOSE BLD-MCNC: 289 MG/DL (ref 70–99)
POTASSIUM SERPL-SCNC: 4.7 MMOL/L (ref 3.5–5.1)
PRO-BNP: 199 PG/ML (ref 0–124)
SODIUM BLD-SCNC: 139 MMOL/L (ref 136–145)

## 2018-04-16 PROCEDURE — G8427 DOCREV CUR MEDS BY ELIG CLIN: HCPCS | Performed by: INTERNAL MEDICINE

## 2018-04-16 PROCEDURE — G8417 CALC BMI ABV UP PARAM F/U: HCPCS | Performed by: INTERNAL MEDICINE

## 2018-04-16 PROCEDURE — G8598 ASA/ANTIPLAT THER USED: HCPCS | Performed by: INTERNAL MEDICINE

## 2018-04-16 PROCEDURE — 1036F TOBACCO NON-USER: CPT | Performed by: INTERNAL MEDICINE

## 2018-04-16 PROCEDURE — 1111F DSCHRG MED/CURRENT MED MERGE: CPT | Performed by: INTERNAL MEDICINE

## 2018-04-16 PROCEDURE — 99214 OFFICE O/P EST MOD 30 MIN: CPT | Performed by: INTERNAL MEDICINE

## 2018-04-16 RX ORDER — ALBUTEROL SULFATE 90 UG/1
2 AEROSOL, METERED RESPIRATORY (INHALATION) EVERY 6 HOURS PRN
Qty: 1 INHALER | Refills: 6 | Status: SHIPPED | OUTPATIENT
Start: 2018-04-16 | End: 2020-10-28 | Stop reason: ALTCHOICE

## 2018-04-17 ENCOUNTER — TELEPHONE (OUTPATIENT)
Dept: CARDIOLOGY CLINIC | Age: 48
End: 2018-04-17

## 2018-04-23 ENCOUNTER — TELEPHONE (OUTPATIENT)
Dept: CARDIOLOGY CLINIC | Age: 48
End: 2018-04-23

## 2018-04-23 LAB
ANION GAP SERPL CALCULATED.3IONS-SCNC: 11 MMOL/L (ref 3–16)
BUN BLDV-MCNC: 30 MG/DL (ref 7–20)
CALCIUM SERPL-MCNC: 8.4 MG/DL (ref 8.3–10.6)
CHLORIDE BLD-SCNC: 96 MMOL/L (ref 99–110)
CO2: 33 MMOL/L (ref 21–32)
CREAT SERPL-MCNC: 1 MG/DL (ref 0.6–1.1)
GFR AFRICAN AMERICAN: >60
GFR NON-AFRICAN AMERICAN: 59
GLUCOSE BLD-MCNC: 344 MG/DL (ref 70–99)
POTASSIUM SERPL-SCNC: 4.5 MMOL/L (ref 3.5–5.1)
PRO-BNP: 162 PG/ML (ref 0–124)
SODIUM BLD-SCNC: 140 MMOL/L (ref 136–145)

## 2018-04-24 ENCOUNTER — TELEPHONE (OUTPATIENT)
Dept: ENDOCRINOLOGY | Age: 48
End: 2018-04-24

## 2018-04-26 ENCOUNTER — OFFICE VISIT (OUTPATIENT)
Dept: CARDIOLOGY CLINIC | Age: 48
End: 2018-04-26

## 2018-04-26 VITALS
BODY MASS INDEX: 31.76 KG/M2 | SYSTOLIC BLOOD PRESSURE: 102 MMHG | OXYGEN SATURATION: 99 % | WEIGHT: 186 LBS | HEART RATE: 97 BPM | DIASTOLIC BLOOD PRESSURE: 80 MMHG | HEIGHT: 64 IN

## 2018-04-26 DIAGNOSIS — I50.30 (HFPEF) HEART FAILURE WITH PRESERVED EJECTION FRACTION (HCC): Primary | Chronic | ICD-10-CM

## 2018-04-26 DIAGNOSIS — E89.89 LYMPHEDEMA OF UPPER EXTREMITY FOLLOWING LYMPHADENECTOMY: ICD-10-CM

## 2018-04-26 DIAGNOSIS — I25.10 CORONARY ARTERY DISEASE INVOLVING NATIVE CORONARY ARTERY OF NATIVE HEART WITHOUT ANGINA PECTORIS: Chronic | ICD-10-CM

## 2018-04-26 DIAGNOSIS — I89.0 LYMPHEDEMA OF UPPER EXTREMITY FOLLOWING LYMPHADENECTOMY: ICD-10-CM

## 2018-04-26 DIAGNOSIS — R06.02 SOB (SHORTNESS OF BREATH): Chronic | ICD-10-CM

## 2018-04-26 DIAGNOSIS — I10 ESSENTIAL HYPERTENSION: Chronic | ICD-10-CM

## 2018-04-26 DIAGNOSIS — G47.33 OSA (OBSTRUCTIVE SLEEP APNEA): ICD-10-CM

## 2018-04-26 PROCEDURE — G8598 ASA/ANTIPLAT THER USED: HCPCS | Performed by: INTERNAL MEDICINE

## 2018-04-26 PROCEDURE — 99214 OFFICE O/P EST MOD 30 MIN: CPT | Performed by: INTERNAL MEDICINE

## 2018-04-26 PROCEDURE — 1036F TOBACCO NON-USER: CPT | Performed by: INTERNAL MEDICINE

## 2018-04-26 PROCEDURE — G8427 DOCREV CUR MEDS BY ELIG CLIN: HCPCS | Performed by: INTERNAL MEDICINE

## 2018-04-26 PROCEDURE — G8417 CALC BMI ABV UP PARAM F/U: HCPCS | Performed by: INTERNAL MEDICINE

## 2018-04-30 LAB
A/G RATIO: 1.6 (ref 1.1–2.2)
ALBUMIN SERPL-MCNC: 4 G/DL (ref 3.4–5)
ALP BLD-CCNC: 47 U/L (ref 40–129)
ALT SERPL-CCNC: 16 U/L (ref 10–40)
ANION GAP SERPL CALCULATED.3IONS-SCNC: 13 MMOL/L (ref 3–16)
AST SERPL-CCNC: 21 U/L (ref 15–37)
BILIRUB SERPL-MCNC: <0.2 MG/DL (ref 0–1)
BUN BLDV-MCNC: 33 MG/DL (ref 7–20)
CALCIUM SERPL-MCNC: 9.4 MG/DL (ref 8.3–10.6)
CHLORIDE BLD-SCNC: 96 MMOL/L (ref 99–110)
CHOLESTEROL, TOTAL: 194 MG/DL (ref 0–199)
CO2: 30 MMOL/L (ref 21–32)
CREAT SERPL-MCNC: 0.9 MG/DL (ref 0.6–1.1)
ESTIMATED AVERAGE GLUCOSE: 182.9 MG/DL
GFR AFRICAN AMERICAN: >60
GFR NON-AFRICAN AMERICAN: >60
GLOBULIN: 2.5 G/DL
GLUCOSE BLD-MCNC: 241 MG/DL (ref 70–99)
HBA1C MFR BLD: 8 %
HDLC SERPL-MCNC: 38 MG/DL (ref 40–60)
LDL CHOLESTEROL CALCULATED: ABNORMAL MG/DL
LDL CHOLESTEROL DIRECT: 115 MG/DL
POTASSIUM SERPL-SCNC: 3.5 MMOL/L (ref 3.5–5.1)
PRO-BNP: 148 PG/ML (ref 0–124)
SODIUM BLD-SCNC: 139 MMOL/L (ref 136–145)
TOTAL PROTEIN: 6.5 G/DL (ref 6.4–8.2)
TRIGL SERPL-MCNC: 406 MG/DL (ref 0–150)
TSH REFLEX: 2.64 UIU/ML (ref 0.27–4.2)
VLDLC SERPL CALC-MCNC: ABNORMAL MG/DL

## 2018-05-01 ENCOUNTER — HOSPITAL ENCOUNTER (OUTPATIENT)
Dept: ONCOLOGY | Age: 48
Discharge: HOME | End: 2018-05-31
Attending: INTERNAL MEDICINE | Admitting: INTERNAL MEDICINE

## 2018-05-01 ENCOUNTER — HOSPITAL ENCOUNTER (OUTPATIENT)
Dept: OTHER | Age: 48
Discharge: HOME | End: 2018-05-31
Attending: INTERNAL MEDICINE | Admitting: INTERNAL MEDICINE

## 2018-05-02 ENCOUNTER — TELEPHONE (OUTPATIENT)
Dept: CARDIOLOGY | Age: 48
End: 2018-05-02

## 2018-05-02 ENCOUNTER — OFFICE VISIT (OUTPATIENT)
Dept: ENDOCRINOLOGY | Age: 48
End: 2018-05-02

## 2018-05-02 VITALS
BODY MASS INDEX: 32.1 KG/M2 | DIASTOLIC BLOOD PRESSURE: 84 MMHG | WEIGHT: 187 LBS | OXYGEN SATURATION: 100 % | HEART RATE: 86 BPM | SYSTOLIC BLOOD PRESSURE: 128 MMHG

## 2018-05-02 DIAGNOSIS — E78.5 HYPERLIPIDEMIA LDL GOAL <70: ICD-10-CM

## 2018-05-02 DIAGNOSIS — C50.812 MALIGNANT NEOPLASM OF OVERLAPPING SITES OF LEFT FEMALE BREAST, UNSPECIFIED ESTROGEN RECEPTOR STATUS (HCC): ICD-10-CM

## 2018-05-02 DIAGNOSIS — E11.00 TYPE 2 DIAB W HYPROSM W/O NONKET HYPRGLY-HYPROS COMA (NKHHC) (HCC): Primary | ICD-10-CM

## 2018-05-02 DIAGNOSIS — I50.9 CHRONIC CONGESTIVE HEART FAILURE, UNSPECIFIED CONGESTIVE HEART FAILURE TYPE: ICD-10-CM

## 2018-05-02 DIAGNOSIS — I50.33 ACUTE ON CHRONIC DIASTOLIC HEART FAILURE (HCC): Chronic | ICD-10-CM

## 2018-05-02 DIAGNOSIS — E06.3 HYPOTHYROIDISM DUE TO HASHIMOTO'S THYROIDITIS: ICD-10-CM

## 2018-05-02 DIAGNOSIS — E11.42 DIABETIC POLYNEUROPATHY ASSOCIATED WITH TYPE 2 DIABETES MELLITUS (HCC): ICD-10-CM

## 2018-05-02 DIAGNOSIS — N17.9 AKI (ACUTE KIDNEY INJURY) (HCC): ICD-10-CM

## 2018-05-02 DIAGNOSIS — E66.09 CLASS 1 OBESITY DUE TO EXCESS CALORIES WITH SERIOUS COMORBIDITY AND BODY MASS INDEX (BMI) OF 31.0 TO 31.9 IN ADULT: ICD-10-CM

## 2018-05-02 DIAGNOSIS — E03.8 HYPOTHYROIDISM DUE TO HASHIMOTO'S THYROIDITIS: ICD-10-CM

## 2018-05-02 PROCEDURE — 96160 PT-FOCUSED HLTH RISK ASSMT: CPT | Performed by: INTERNAL MEDICINE

## 2018-05-02 PROCEDURE — 99215 OFFICE O/P EST HI 40 MIN: CPT | Performed by: INTERNAL MEDICINE

## 2018-05-02 RX ORDER — GABAPENTIN 300 MG/1
300 CAPSULE ORAL 2 TIMES DAILY
Qty: 60 CAPSULE | Status: CANCELLED | OUTPATIENT
Start: 2018-05-02

## 2018-05-02 RX ORDER — GLIPIZIDE 10 MG/1
10 TABLET, FILM COATED, EXTENDED RELEASE ORAL DAILY
Qty: 30 TABLET | Refills: 3 | Status: SHIPPED | OUTPATIENT
Start: 2018-05-02 | End: 2018-09-11 | Stop reason: SDUPTHER

## 2018-05-02 RX ORDER — FERROUS SULFATE 325(65) MG
325 TABLET ORAL 2 TIMES DAILY WITH MEALS
Qty: 30 TABLET | Refills: 3 | Status: SHIPPED | OUTPATIENT
Start: 2018-05-02 | End: 2018-05-08 | Stop reason: SDUPTHER

## 2018-05-02 ASSESSMENT — PATIENT HEALTH QUESTIONNAIRE - PHQ9
10. IF YOU CHECKED OFF ANY PROBLEMS, HOW DIFFICULT HAVE THESE PROBLEMS MADE IT FOR YOU TO DO YOUR WORK, TAKE CARE OF THINGS AT HOME, OR GET ALONG WITH OTHER PEOPLE: 2
5. POOR APPETITE OR OVEREATING: 3
7. TROUBLE CONCENTRATING ON THINGS, SUCH AS READING THE NEWSPAPER OR WATCHING TELEVISION: 3
SUM OF ALL RESPONSES TO PHQ QUESTIONS 1-9: 22
9. THOUGHTS THAT YOU WOULD BE BETTER OFF DEAD, OR OF HURTING YOURSELF: 3
8. MOVING OR SPEAKING SO SLOWLY THAT OTHER PEOPLE COULD HAVE NOTICED. OR THE OPPOSITE, BEING SO FIGETY OR RESTLESS THAT YOU HAVE BEEN MOVING AROUND A LOT MORE THAN USUAL: 1
SUM OF ALL RESPONSES TO PHQ9 QUESTIONS 1 & 2: 6
1. LITTLE INTEREST OR PLEASURE IN DOING THINGS: 3
3. TROUBLE FALLING OR STAYING ASLEEP: 0
6. FEELING BAD ABOUT YOURSELF - OR THAT YOU ARE A FAILURE OR HAVE LET YOURSELF OR YOUR FAMILY DOWN: 3
2. FEELING DOWN, DEPRESSED OR HOPELESS: 3
4. FEELING TIRED OR HAVING LITTLE ENERGY: 3

## 2018-05-03 ENCOUNTER — TELEPHONE (OUTPATIENT)
Dept: PULMONOLOGY | Age: 48
End: 2018-05-03

## 2018-05-03 ENCOUNTER — TELEPHONE (OUTPATIENT)
Dept: CARDIOLOGY CLINIC | Age: 48
End: 2018-05-03

## 2018-05-03 DIAGNOSIS — M79.89 SWELLING OF ARM: Primary | ICD-10-CM

## 2018-05-04 ENCOUNTER — TELEPHONE (OUTPATIENT)
Dept: CARDIOLOGY CLINIC | Age: 48
End: 2018-05-04

## 2018-05-07 ENCOUNTER — TELEPHONE (OUTPATIENT)
Dept: CARDIOLOGY CLINIC | Age: 48
End: 2018-05-07

## 2018-05-07 LAB
ANION GAP SERPL CALCULATED.3IONS-SCNC: 14 MMOL/L (ref 3–16)
BUN BLDV-MCNC: 36 MG/DL (ref 7–20)
CALCIUM SERPL-MCNC: 9.2 MG/DL (ref 8.3–10.6)
CHLORIDE BLD-SCNC: 94 MMOL/L (ref 99–110)
CO2: 31 MMOL/L (ref 21–32)
CREAT SERPL-MCNC: 1.1 MG/DL (ref 0.6–1.1)
GFR AFRICAN AMERICAN: >60
GFR NON-AFRICAN AMERICAN: 53
GLUCOSE BLD-MCNC: 61 MG/DL (ref 70–99)
POTASSIUM SERPL-SCNC: 3.9 MMOL/L (ref 3.5–5.1)
PRO-BNP: 147 PG/ML (ref 0–124)
SODIUM BLD-SCNC: 139 MMOL/L (ref 136–145)

## 2018-05-08 ENCOUNTER — TELEPHONE (OUTPATIENT)
Dept: INTERNAL MEDICINE CLINIC | Age: 48
End: 2018-05-08

## 2018-05-08 ENCOUNTER — TELEPHONE (OUTPATIENT)
Dept: ENDOCRINOLOGY | Age: 48
End: 2018-05-08

## 2018-05-08 DIAGNOSIS — D50.9 IRON DEFICIENCY ANEMIA, UNSPECIFIED IRON DEFICIENCY ANEMIA TYPE: Primary | ICD-10-CM

## 2018-05-08 RX ORDER — FERROUS SULFATE 325(65) MG
325 TABLET ORAL 2 TIMES DAILY WITH MEALS
Qty: 180 TABLET | Refills: 3 | Status: SHIPPED | OUTPATIENT
Start: 2018-05-08 | End: 2019-01-21 | Stop reason: SDUPTHER

## 2018-05-10 ENCOUNTER — HOSPITAL ENCOUNTER (OUTPATIENT)
Dept: VASCULAR LAB | Age: 48
Discharge: HOME | End: 2018-05-10
Attending: INTERNAL MEDICINE | Admitting: INTERNAL MEDICINE

## 2018-05-10 VITALS — OXYGEN SATURATION: 99 % | RESPIRATION RATE: 18 BRPM | HEART RATE: 86 BPM

## 2018-05-10 DIAGNOSIS — M79.89 OTHER SPECIFIED SOFT TISSUE DISORDERS (CODE): ICD-10-CM

## 2018-05-10 RX ORDER — ALBUTEROL SULFATE 90 UG/1
4 AEROSOL, METERED RESPIRATORY (INHALATION) ONCE
Status: COMPLETED | OUTPATIENT
Start: 2018-05-10 | End: 2018-05-10

## 2018-05-10 RX ADMIN — ALBUTEROL SULFATE 4 PUFF: 90 AEROSOL, METERED RESPIRATORY (INHALATION) at 17:23

## 2018-05-14 ENCOUNTER — TELEPHONE (OUTPATIENT)
Dept: CARDIOLOGY CLINIC | Age: 48
End: 2018-05-14

## 2018-05-14 LAB
ANION GAP SERPL CALCULATED.3IONS-SCNC: 11 MMOL/L (ref 3–16)
BUN BLDV-MCNC: 32 MG/DL (ref 7–20)
CALCIUM SERPL-MCNC: 9.4 MG/DL (ref 8.3–10.6)
CHLORIDE BLD-SCNC: 94 MMOL/L (ref 99–110)
CO2: 34 MMOL/L (ref 21–32)
CREAT SERPL-MCNC: 1 MG/DL (ref 0.6–1.1)
GFR AFRICAN AMERICAN: >60
GFR NON-AFRICAN AMERICAN: 59
GLUCOSE BLD-MCNC: 260 MG/DL (ref 70–99)
POTASSIUM SERPL-SCNC: 4.3 MMOL/L (ref 3.5–5.1)
PRO-BNP: 148 PG/ML (ref 0–124)
SODIUM BLD-SCNC: 139 MMOL/L (ref 136–145)

## 2018-05-16 ENCOUNTER — TELEPHONE (OUTPATIENT)
Dept: CARDIOLOGY CLINIC | Age: 48
End: 2018-05-16

## 2018-05-21 ENCOUNTER — TELEPHONE (OUTPATIENT)
Dept: CARDIOLOGY CLINIC | Age: 48
End: 2018-05-21

## 2018-05-21 LAB
ANION GAP SERPL CALCULATED.3IONS-SCNC: 13 MMOL/L (ref 3–16)
BUN BLDV-MCNC: 32 MG/DL (ref 7–20)
CALCIUM SERPL-MCNC: 9.2 MG/DL (ref 8.3–10.6)
CHLORIDE BLD-SCNC: 94 MMOL/L (ref 99–110)
CO2: 33 MMOL/L (ref 21–32)
CREAT SERPL-MCNC: 1.1 MG/DL (ref 0.6–1.1)
GFR AFRICAN AMERICAN: >60
GFR NON-AFRICAN AMERICAN: 53
GLUCOSE BLD-MCNC: 138 MG/DL (ref 70–99)
POTASSIUM SERPL-SCNC: 4.4 MMOL/L (ref 3.5–5.1)
PRO-BNP: 82 PG/ML (ref 0–124)
SODIUM BLD-SCNC: 140 MMOL/L (ref 136–145)

## 2018-05-29 LAB
ANION GAP SERPL CALCULATED.3IONS-SCNC: 12 MMOL/L (ref 3–16)
BUN BLDV-MCNC: 27 MG/DL (ref 7–20)
CALCIUM SERPL-MCNC: 9.5 MG/DL (ref 8.3–10.6)
CHLORIDE BLD-SCNC: 97 MMOL/L (ref 99–110)
CO2: 30 MMOL/L (ref 21–32)
CREAT SERPL-MCNC: 0.9 MG/DL (ref 0.6–1.1)
GFR AFRICAN AMERICAN: >60
GFR NON-AFRICAN AMERICAN: >60
GLUCOSE BLD-MCNC: 369 MG/DL (ref 70–99)
POTASSIUM SERPL-SCNC: 4.6 MMOL/L (ref 3.5–5.1)
PRO-BNP: 187 PG/ML (ref 0–124)
SODIUM BLD-SCNC: 139 MMOL/L (ref 136–145)

## 2018-05-30 ENCOUNTER — TELEPHONE (OUTPATIENT)
Dept: CARDIOLOGY CLINIC | Age: 48
End: 2018-05-30

## 2018-05-30 ENCOUNTER — OFFICE VISIT (OUTPATIENT)
Dept: CARDIOLOGY CLINIC | Age: 48
End: 2018-05-30

## 2018-05-30 VITALS
DIASTOLIC BLOOD PRESSURE: 48 MMHG | HEIGHT: 64 IN | HEART RATE: 80 BPM | WEIGHT: 187 LBS | SYSTOLIC BLOOD PRESSURE: 94 MMHG | BODY MASS INDEX: 31.92 KG/M2

## 2018-05-30 DIAGNOSIS — I10 ESSENTIAL HYPERTENSION: ICD-10-CM

## 2018-05-30 DIAGNOSIS — I25.10 CORONARY ARTERY DISEASE INVOLVING NATIVE CORONARY ARTERY OF NATIVE HEART WITHOUT ANGINA PECTORIS: Chronic | ICD-10-CM

## 2018-05-30 DIAGNOSIS — I50.22 SYSTOLIC CHF, CHRONIC (HCC): ICD-10-CM

## 2018-05-30 DIAGNOSIS — R06.02 SOB (SHORTNESS OF BREATH): Chronic | ICD-10-CM

## 2018-05-30 DIAGNOSIS — N28.9 RENAL INSUFFICIENCY: Chronic | ICD-10-CM

## 2018-05-30 DIAGNOSIS — I50.30 (HFPEF) HEART FAILURE WITH PRESERVED EJECTION FRACTION (HCC): Primary | Chronic | ICD-10-CM

## 2018-05-30 PROCEDURE — G8598 ASA/ANTIPLAT THER USED: HCPCS | Performed by: INTERNAL MEDICINE

## 2018-05-30 PROCEDURE — 1036F TOBACCO NON-USER: CPT | Performed by: INTERNAL MEDICINE

## 2018-05-30 PROCEDURE — 99214 OFFICE O/P EST MOD 30 MIN: CPT | Performed by: INTERNAL MEDICINE

## 2018-05-30 PROCEDURE — G8427 DOCREV CUR MEDS BY ELIG CLIN: HCPCS | Performed by: INTERNAL MEDICINE

## 2018-05-30 PROCEDURE — G8417 CALC BMI ABV UP PARAM F/U: HCPCS | Performed by: INTERNAL MEDICINE

## 2018-05-30 RX ORDER — METOLAZONE 2.5 MG/1
TABLET ORAL
Qty: 30 TABLET | Refills: 0 | Status: SHIPPED | OUTPATIENT
Start: 2018-05-30 | End: 2018-06-18 | Stop reason: SDUPTHER

## 2018-05-30 RX ORDER — SPIRONOLACTONE 50 MG/1
TABLET, FILM COATED ORAL
Qty: 270 TABLET | Refills: 3 | Status: SHIPPED | OUTPATIENT
Start: 2018-05-30 | End: 2019-06-24 | Stop reason: SDUPTHER

## 2018-06-01 ENCOUNTER — HOSPITAL ENCOUNTER (OUTPATIENT)
Dept: OTHER | Age: 48
Discharge: HOME | End: 2018-06-30
Attending: INTERNAL MEDICINE | Admitting: INTERNAL MEDICINE

## 2018-06-04 LAB
ANION GAP SERPL CALCULATED.3IONS-SCNC: 11 MMOL/L (ref 3–16)
BUN BLDV-MCNC: 34 MG/DL (ref 7–20)
CALCIUM SERPL-MCNC: 9.5 MG/DL (ref 8.3–10.6)
CHLORIDE BLD-SCNC: 95 MMOL/L (ref 99–110)
CO2: 33 MMOL/L (ref 21–32)
CREAT SERPL-MCNC: 1 MG/DL (ref 0.6–1.1)
GFR AFRICAN AMERICAN: >60
GFR NON-AFRICAN AMERICAN: 59
GLUCOSE BLD-MCNC: 163 MG/DL (ref 70–99)
POTASSIUM SERPL-SCNC: 3.9 MMOL/L (ref 3.5–5.1)
PRO-BNP: 129 PG/ML (ref 0–124)
SODIUM BLD-SCNC: 139 MMOL/L (ref 136–145)

## 2018-06-11 LAB
ANION GAP SERPL CALCULATED.3IONS-SCNC: 13 MMOL/L (ref 3–16)
BUN BLDV-MCNC: 45 MG/DL (ref 7–20)
CALCIUM SERPL-MCNC: 10.2 MG/DL (ref 8.3–10.6)
CHLORIDE BLD-SCNC: 92 MMOL/L (ref 99–110)
CO2: 33 MMOL/L (ref 21–32)
CREAT SERPL-MCNC: 1.3 MG/DL (ref 0.6–1.1)
GFR AFRICAN AMERICAN: 53
GFR NON-AFRICAN AMERICAN: 43
GLUCOSE BLD-MCNC: 117 MG/DL (ref 70–99)
POTASSIUM SERPL-SCNC: 4.2 MMOL/L (ref 3.5–5.1)
PRO-BNP: 143 PG/ML (ref 0–124)
SODIUM BLD-SCNC: 138 MMOL/L (ref 136–145)

## 2018-06-18 ENCOUNTER — TELEPHONE (OUTPATIENT)
Dept: CARDIOLOGY CLINIC | Age: 48
End: 2018-06-18

## 2018-06-18 DIAGNOSIS — I25.10 CORONARY ARTERY DISEASE INVOLVING NATIVE CORONARY ARTERY OF NATIVE HEART WITHOUT ANGINA PECTORIS: Chronic | ICD-10-CM

## 2018-06-18 DIAGNOSIS — I50.22 SYSTOLIC CHF, CHRONIC (HCC): ICD-10-CM

## 2018-06-18 DIAGNOSIS — N28.9 RENAL INSUFFICIENCY: Chronic | ICD-10-CM

## 2018-06-18 DIAGNOSIS — I10 ESSENTIAL HYPERTENSION: ICD-10-CM

## 2018-06-18 DIAGNOSIS — I50.30 (HFPEF) HEART FAILURE WITH PRESERVED EJECTION FRACTION (HCC): Chronic | ICD-10-CM

## 2018-06-18 DIAGNOSIS — R06.02 SOB (SHORTNESS OF BREATH): Chronic | ICD-10-CM

## 2018-06-18 LAB
ANION GAP SERPL CALCULATED.3IONS-SCNC: 11 MMOL/L (ref 3–16)
BUN BLDV-MCNC: 38 MG/DL (ref 7–20)
CALCIUM SERPL-MCNC: 9.5 MG/DL (ref 8.3–10.6)
CHLORIDE BLD-SCNC: 96 MMOL/L (ref 99–110)
CO2: 32 MMOL/L (ref 21–32)
CREAT SERPL-MCNC: 1.2 MG/DL (ref 0.6–1.1)
GFR AFRICAN AMERICAN: 58
GFR NON-AFRICAN AMERICAN: 48
GLUCOSE BLD-MCNC: 242 MG/DL (ref 70–99)
POTASSIUM SERPL-SCNC: 4.2 MMOL/L (ref 3.5–5.1)
PRO-BNP: 165 PG/ML (ref 0–124)
SODIUM BLD-SCNC: 139 MMOL/L (ref 136–145)

## 2018-06-19 ENCOUNTER — TELEPHONE (OUTPATIENT)
Dept: CARDIOLOGY CLINIC | Age: 48
End: 2018-06-19

## 2018-06-20 RX ORDER — METOLAZONE 2.5 MG/1
TABLET ORAL
Qty: 30 TABLET | Refills: 0 | Status: SHIPPED | OUTPATIENT
Start: 2018-06-20 | End: 2018-08-12 | Stop reason: SDUPTHER

## 2018-06-22 ENCOUNTER — OFFICE VISIT (OUTPATIENT)
Dept: PULMONOLOGY | Age: 48
End: 2018-06-22

## 2018-06-22 ENCOUNTER — TELEPHONE (OUTPATIENT)
Dept: CARDIOLOGY CLINIC | Age: 48
End: 2018-06-22

## 2018-06-22 VITALS
SYSTOLIC BLOOD PRESSURE: 130 MMHG | OXYGEN SATURATION: 95 % | DIASTOLIC BLOOD PRESSURE: 82 MMHG | HEIGHT: 64 IN | HEART RATE: 96 BPM | RESPIRATION RATE: 18 BRPM | WEIGHT: 180 LBS | BODY MASS INDEX: 30.73 KG/M2

## 2018-06-22 DIAGNOSIS — J45.40 MODERATE PERSISTENT ASTHMA, UNSPECIFIED WHETHER COMPLICATED: ICD-10-CM

## 2018-06-22 DIAGNOSIS — J96.11 CHRONIC RESPIRATORY FAILURE WITH HYPOXIA (HCC): Primary | ICD-10-CM

## 2018-06-22 DIAGNOSIS — I50.30 (HFPEF) HEART FAILURE WITH PRESERVED EJECTION FRACTION (HCC): Chronic | ICD-10-CM

## 2018-06-22 DIAGNOSIS — G47.33 OSA (OBSTRUCTIVE SLEEP APNEA): ICD-10-CM

## 2018-06-22 PROCEDURE — G8417 CALC BMI ABV UP PARAM F/U: HCPCS | Performed by: INTERNAL MEDICINE

## 2018-06-22 PROCEDURE — 99214 OFFICE O/P EST MOD 30 MIN: CPT | Performed by: INTERNAL MEDICINE

## 2018-06-22 PROCEDURE — G8598 ASA/ANTIPLAT THER USED: HCPCS | Performed by: INTERNAL MEDICINE

## 2018-06-22 PROCEDURE — G8427 DOCREV CUR MEDS BY ELIG CLIN: HCPCS | Performed by: INTERNAL MEDICINE

## 2018-06-22 PROCEDURE — 1036F TOBACCO NON-USER: CPT | Performed by: INTERNAL MEDICINE

## 2018-06-25 LAB
ANION GAP SERPL CALCULATED.3IONS-SCNC: 14 MMOL/L (ref 3–16)
BUN BLDV-MCNC: 38 MG/DL (ref 7–20)
CALCIUM SERPL-MCNC: 9.8 MG/DL (ref 8.3–10.6)
CHLORIDE BLD-SCNC: 97 MMOL/L (ref 99–110)
CO2: 30 MMOL/L (ref 21–32)
CREAT SERPL-MCNC: 1.1 MG/DL (ref 0.6–1.1)
GFR AFRICAN AMERICAN: >60
GFR NON-AFRICAN AMERICAN: 53
GLUCOSE BLD-MCNC: 73 MG/DL (ref 70–99)
POTASSIUM SERPL-SCNC: 4.6 MMOL/L (ref 3.5–5.1)
PRO-BNP: 385 PG/ML (ref 0–124)
SODIUM BLD-SCNC: 141 MMOL/L (ref 136–145)

## 2018-06-27 ENCOUNTER — HOSPITAL ENCOUNTER (OUTPATIENT)
Dept: ONCOLOGY | Age: 48
Discharge: HOME | End: 2018-06-30
Attending: INTERNAL MEDICINE | Admitting: INTERNAL MEDICINE

## 2018-06-27 ENCOUNTER — OFFICE VISIT (OUTPATIENT)
Dept: CARDIOLOGY CLINIC | Age: 48
End: 2018-06-27

## 2018-06-27 ENCOUNTER — TELEPHONE (OUTPATIENT)
Dept: CARDIOLOGY CLINIC | Age: 48
End: 2018-06-27

## 2018-06-27 VITALS
DIASTOLIC BLOOD PRESSURE: 76 MMHG | BODY MASS INDEX: 32.78 KG/M2 | HEART RATE: 88 BPM | HEIGHT: 64 IN | WEIGHT: 192 LBS | SYSTOLIC BLOOD PRESSURE: 120 MMHG

## 2018-06-27 VITALS — DIASTOLIC BLOOD PRESSURE: 67 MMHG | HEART RATE: 77 BPM | SYSTOLIC BLOOD PRESSURE: 131 MMHG

## 2018-06-27 DIAGNOSIS — R06.02 SOB (SHORTNESS OF BREATH): Chronic | ICD-10-CM

## 2018-06-27 DIAGNOSIS — I10 ESSENTIAL HYPERTENSION: Chronic | ICD-10-CM

## 2018-06-27 DIAGNOSIS — I89.0 LYMPHEDEMA OF UPPER EXTREMITY FOLLOWING LYMPHADENECTOMY: ICD-10-CM

## 2018-06-27 DIAGNOSIS — E89.89 LYMPHEDEMA OF UPPER EXTREMITY FOLLOWING LYMPHADENECTOMY: ICD-10-CM

## 2018-06-27 DIAGNOSIS — I50.30 (HFPEF) HEART FAILURE WITH PRESERVED EJECTION FRACTION (HCC): Primary | Chronic | ICD-10-CM

## 2018-06-27 PROBLEM — I50.33 ACUTE ON CHRONIC DIASTOLIC HEART FAILURE (HCC): Chronic | Status: RESOLVED | Noted: 2018-01-11 | Resolved: 2018-06-27

## 2018-06-27 PROCEDURE — 99215 OFFICE O/P EST HI 40 MIN: CPT | Performed by: INTERNAL MEDICINE

## 2018-06-27 PROCEDURE — 1036F TOBACCO NON-USER: CPT | Performed by: INTERNAL MEDICINE

## 2018-06-27 PROCEDURE — G8598 ASA/ANTIPLAT THER USED: HCPCS | Performed by: INTERNAL MEDICINE

## 2018-06-27 PROCEDURE — G8427 DOCREV CUR MEDS BY ELIG CLIN: HCPCS | Performed by: INTERNAL MEDICINE

## 2018-06-27 PROCEDURE — G8417 CALC BMI ABV UP PARAM F/U: HCPCS | Performed by: INTERNAL MEDICINE

## 2018-06-27 RX ORDER — FUROSEMIDE 10 MG/ML
120 INJECTION INTRAMUSCULAR; INTRAVENOUS ONCE
Status: COMPLETED | OUTPATIENT
Start: 2018-06-27 | End: 2018-06-27

## 2018-06-27 RX ORDER — SODIUM CHLORIDE 0.9 % (FLUSH) 0.9 %
SYRINGE (ML) INJECTION
Status: DISPENSED
Start: 2018-06-27 | End: 2018-06-28

## 2018-06-27 RX ORDER — FUROSEMIDE 10 MG/ML
INJECTION INTRAMUSCULAR; INTRAVENOUS
Status: COMPLETED
Start: 2018-06-27 | End: 2018-06-27

## 2018-06-27 RX ADMIN — FUROSEMIDE 120 MG: 10 INJECTION INTRAMUSCULAR; INTRAVENOUS at 13:36

## 2018-07-01 ENCOUNTER — HOSPITAL ENCOUNTER (OUTPATIENT)
Dept: ONCOLOGY | Age: 48
Discharge: HOME | End: 2018-07-31
Attending: INTERNAL MEDICINE | Admitting: INTERNAL MEDICINE

## 2018-07-01 ENCOUNTER — HOSPITAL ENCOUNTER (OUTPATIENT)
Dept: OTHER | Age: 48
Discharge: HOME | End: 2018-07-31
Attending: INTERNAL MEDICINE | Admitting: INTERNAL MEDICINE

## 2018-07-02 ENCOUNTER — HOSPITAL ENCOUNTER (OUTPATIENT)
Dept: GENERAL RADIOLOGY | Age: 48
Discharge: HOME | End: 2018-07-02
Attending: INTERNAL MEDICINE | Admitting: INTERNAL MEDICINE

## 2018-07-02 DIAGNOSIS — R13.10 PROBLEMS WITH SWALLOWING AND MASTICATION: ICD-10-CM

## 2018-07-02 DIAGNOSIS — R13.10 DYSPHAGIA: ICD-10-CM

## 2018-07-02 LAB
ANION GAP SERPL CALCULATED.3IONS-SCNC: 12 MMOL/L (ref 3–16)
BUN BLDV-MCNC: 46 MG/DL (ref 7–20)
CALCIUM SERPL-MCNC: 9.2 MG/DL (ref 8.3–10.6)
CHLORIDE BLD-SCNC: 97 MMOL/L (ref 99–110)
CO2: 34 MMOL/L (ref 21–32)
CREAT SERPL-MCNC: 1.1 MG/DL (ref 0.6–1.1)
GFR AFRICAN AMERICAN: >60
GFR NON-AFRICAN AMERICAN: 53
GLUCOSE BLD-MCNC: 78 MG/DL (ref 70–99)
POTASSIUM SERPL-SCNC: 4.9 MMOL/L (ref 3.5–5.1)
PRO-BNP: 114 PG/ML (ref 0–124)
SODIUM BLD-SCNC: 143 MMOL/L (ref 136–145)

## 2018-07-03 ENCOUNTER — TELEPHONE (OUTPATIENT)
Dept: INTERNAL MEDICINE CLINIC | Age: 48
End: 2018-07-03

## 2018-07-03 ENCOUNTER — TELEPHONE (OUTPATIENT)
Dept: CARDIOLOGY CLINIC | Age: 48
End: 2018-07-03

## 2018-07-03 ENCOUNTER — OFFICE VISIT (OUTPATIENT)
Dept: INTERNAL MEDICINE CLINIC | Age: 48
End: 2018-07-03

## 2018-07-03 VITALS
HEART RATE: 76 BPM | DIASTOLIC BLOOD PRESSURE: 80 MMHG | WEIGHT: 188.8 LBS | SYSTOLIC BLOOD PRESSURE: 118 MMHG | BODY MASS INDEX: 32.41 KG/M2

## 2018-07-03 DIAGNOSIS — K21.9 HIATAL HERNIA WITH GERD: Primary | ICD-10-CM

## 2018-07-03 DIAGNOSIS — K44.9 HIATAL HERNIA WITH GERD: Primary | ICD-10-CM

## 2018-07-03 DIAGNOSIS — M79.641 RIGHT HAND PAIN: ICD-10-CM

## 2018-07-03 DIAGNOSIS — K13.79 MOUTH PAIN: ICD-10-CM

## 2018-07-03 DIAGNOSIS — B36.9 FUNGAL DERMATITIS: ICD-10-CM

## 2018-07-03 DIAGNOSIS — J02.9 SORE THROAT: ICD-10-CM

## 2018-07-03 PROCEDURE — G8417 CALC BMI ABV UP PARAM F/U: HCPCS | Performed by: INTERNAL MEDICINE

## 2018-07-03 PROCEDURE — G8427 DOCREV CUR MEDS BY ELIG CLIN: HCPCS | Performed by: INTERNAL MEDICINE

## 2018-07-03 PROCEDURE — 1036F TOBACCO NON-USER: CPT | Performed by: INTERNAL MEDICINE

## 2018-07-03 PROCEDURE — G8598 ASA/ANTIPLAT THER USED: HCPCS | Performed by: INTERNAL MEDICINE

## 2018-07-03 PROCEDURE — 99214 OFFICE O/P EST MOD 30 MIN: CPT | Performed by: INTERNAL MEDICINE

## 2018-07-03 RX ORDER — NYSTATIN 100000 [USP'U]/G
POWDER TOPICAL
Qty: 30 G | Refills: 0 | Status: SHIPPED | OUTPATIENT
Start: 2018-07-03 | End: 2019-02-21 | Stop reason: ALTCHOICE

## 2018-07-03 RX ORDER — OMEPRAZOLE 40 MG/1
40 CAPSULE, DELAYED RELEASE ORAL 2 TIMES DAILY
Qty: 60 CAPSULE | Refills: 1 | Status: SHIPPED | COMMUNITY
Start: 2018-07-03 | End: 2018-08-02

## 2018-07-03 ASSESSMENT — ENCOUNTER SYMPTOMS
VOMITING: 0
NAUSEA: 0
CHEST TIGHTNESS: 0
DIARRHEA: 0
SHORTNESS OF BREATH: 0
ABDOMINAL PAIN: 1
CONSTIPATION: 0

## 2018-07-03 NOTE — PATIENT INSTRUCTIONS
I think that your hand pain is a flare of your rheumatoid arthritis, please call your rheumatologist.    Please let your finger nails grow a little longer and try applying Aquaphor to your finger tips at bedtime to help with the cracked finger tips. May also try to put vitamin E on your finger nails (just open the capsule and smear on your nails). Please increase omeprazole to 40 mg twice a day for 2 months. Your swallow study showed acid reflux and a small hiatal hernia. This may be part of why it is hard to swallow. You may have a yeast infection in your mouth and throat which is causing the burning sensation, Nystatin suspension will help with this. I have also given you a prescription for a mouth wash that will numb the discomfort in your mouth and throat. Patient Education        Hiatal Hernia: Care Instructions  Your Care Instructions  A hiatal hernia occurs when part of the stomach bulges into the chest cavity. A hiatal hernia may allow stomach acid and juices to back up into the esophagus (acid reflux). This can cause a feeling of burning, warmth, heat, or pain behind the breastbone. This feeling may often occur after you eat, soon after you lie down, or when you bend forward, and it may come and go. You also may have a sour taste in your mouth. These symptoms are commonly known as heartburn or reflux. But not all hiatal hernias cause symptoms. Follow-up care is a key part of your treatment and safety. Be sure to make and go to all appointments, and call your doctor if you are having problems. It's also a good idea to know your test results and keep a list of the medicines you take. How can you care for yourself at home? · Take your medicines exactly as prescribed. Call your doctor if you think you are having a problem with your medicine.   · Do not take aspirin or other nonsteroidal anti-inflammatory drugs (NSAIDs), such as ibuprofen (Advil, Motrin) or naproxen (Aleve), unless your doctor says it is okay. Ask your doctor what you can take for pain. · Your doctor may recommend over-the-counter medicine. For mild or occasional indigestion, antacids such as Tums, Gaviscon, Maalox, or Mylanta may help. Your doctor also may recommend over-the-counter acid reducers, such as famotidine (Pepcid AC), cimetidine (Tagamet HB), ranitidine (Zantac 75 and Zantac 150), or omeprazole (Prilosec). Read and follow all instructions on the label. If you use these medicines often, talk with your doctor. · Change your eating habits. ¨ It's best to eat several small meals instead of two or three large meals. ¨ After you eat, wait 2 to 3 hours before you lie down. Late-night snacks aren't a good idea. ¨ Chocolate, mint, and alcohol can make heartburn worse. They relax the valve between the esophagus and the stomach. ¨ Spicy foods, foods that have a lot of acid (like tomatoes and oranges), and coffee can make heartburn symptoms worse in some people. If your symptoms are worse after you eat a certain food, you may want to stop eating that food to see if your symptoms get better. · Do not smoke or chew tobacco.  · If you get heartburn at night, raise the head of your bed 6 to 8 inches by putting the frame on blocks or placing a foam wedge under the head of your mattress. (Adding extra pillows does not work.)  · Do not wear tight clothing around your middle. · Lose weight if you need to. Losing just 5 to 10 pounds can help. When should you call for help? Call your doctor now or seek immediate medical care if:    · You have new or worse belly pain.     · You are vomiting.    Watch closely for changes in your health, and be sure to contact your doctor if:    · You have new or worse symptoms of indigestion.     · You have trouble or pain swallowing.     · You are losing weight.     · You do not get better as expected. Where can you learn more? Go to https://jacobo.health[facility]endEvent. org and sign in to your [employer] account. Enter F755 in the [facility] box to learn more about \"Hiatal Hernia: Care Instructions. \"     If you do not have an account, please click on the \"Sign Up Now\" link. Current as of: May 12, 2017  Content Version: 11.6  © 0787-7209 [employer]. Care instructions adapted under license by [facility] [place] ([facility]). If you have questions about a medical condition or this instruction, always ask your healthcare professional. [address] any warranty or liability for your use of this information.

## 2018-07-03 NOTE — TELEPHONE ENCOUNTER
Looks like she had a barium swallow yesterday which she probably had to drink 2 cups of contrast.  Per her chart she takes metolazone on Monday's, ?did she do this  The barium can cause constipation, has she had a BM since the test was done?

## 2018-07-03 NOTE — TELEPHONE ENCOUNTER
I spoke with Rubalcava Rafita with [employer]. Patient was seen by PCP today and her weight is actually down 4 pounds and some labs done 7/2/18 show bnp of 114 creat 1.1  Most likely weight up due to barium and fluids. If weight up tomorrow, she can take an extra 50 mg once of torsemide.   She currently is taking torsemide 100 mg daily per home care nurse

## 2018-07-03 NOTE — TELEPHONE ENCOUNTER
Angella Treadwell [facility] has not received a return call from one of docs, esteban, aware of erika being oot and needs to hear from one of our other docs before the end of the day. Pt had to have a test done yesterday where she had to drink lots of fluids. Today pt has  2 1/2 lbs weight increase since yesterday and home health nurse would like a return call letting her know what pt should do? Start lasix?   Please call asap

## 2018-07-03 NOTE — TELEPHONE ENCOUNTER
I called and spoke with [facility] and let her know that patient was seen by Jenna Alamo NP in April and a message has been sent to her regarding patient.

## 2018-07-03 NOTE — TELEPHONE ENCOUNTER
Spoke to the [facility]. The pt did take the Metolazone 7/2/18. She is not with the pt now, not sure as to the BM. Asking for possible orders with set parameters, so that extra phone calls can be avoided.

## 2018-07-03 NOTE — TELEPHONE ENCOUNTER
Gm Odell called from [address] and states that she want to know the ingreadings of magic mouse wash. She request to call back.

## 2018-07-03 NOTE — PROGRESS NOTES
infection 2018    urine    Fibromyalgia     Gastric ulcer, unspecified as acute or chronic, without mention of hemorrhage, perforation, or obstruction     GERD (gastroesophageal reflux disease)     HIGH CHOLESTEROL     Hypertension     Hypothyroidism     Severe persistent asthma without complication 2703    Thyroid disease     TIA (transient ischemic attack)     with occasional left leg and hand weakness      Past Surgical History:   Procedure Laterality Date    BREAST SURGERY      left mastectomy    CARPAL TUNNEL RELEASE      Bilateral    FINGER CONTRACTURE SURGERY      HYSTERECTOMY      TONSILLECTOMY        Social History     Social History    Marital status:      Spouse name: Rosalinda Omalley Number of children: 3    Years of education: N/A     Occupational History    disabled      Social History Main Topics    Smoking status: Never Smoker    Smokeless tobacco: Never Used    Alcohol use No    Drug use: No    Sexual activity: Not on file     Other Topics Concern    Not on file     Social History Narrative    No narrative on file     Family History   Problem Relation Age of Onset    Asthma Other     Cancer Other     Depression Other     Diabetes Other     Hypertension Other     High Cholesterol Other     Migraines Other     Heart Attack Father 72         of MI    High Blood Pressure Mother     Diabetes Mother         Outpatient Medications Prior to Visit   Medication Sig Dispense Refill    metolazone (ZAROXOLYN) 2.5 MG tablet TAKE 1 TABLET BY MOUTH ONE TIME A DAY AS NEEDED 30 tablet 0    spironolactone (ALDACTONE) 50 MG tablet 100mg morning, 50mg evening 270 tablet 3    ferrous sulfate 325 (65 Fe) MG tablet Take 1 tablet by mouth 2 times daily (with meals) 180 tablet 3    Insulin Pen Needle (B-D UF III MINI PEN NEEDLES) 31G X 5 MM MISC 1 each by Does not apply route daily 450 each 1    glipiZIDE (GLUCOTROL XL) 10 MG extended release tablet Take 1 tablet by mouth daily 30 tablet 3    Insulin Degludec 200 UNIT/ML SOPN Take 100 units daily 5 pen 3    albuterol sulfate  (90 Base) MCG/ACT inhaler Inhale 2 puffs into the lungs every 6 hours as needed for Wheezing (Patient taking differently: Inhale 2 puffs into the lungs 2 times daily ) 1 Inhaler 6    sennosides-docusate sodium (SENOKOT-S) 8.6-50 MG tablet Take 2 tablets by mouth 2 times daily 120 tablet 3    gabapentin (NEURONTIN) 300 MG capsule Take 300 mg by mouth 2 times daily.  OXYGEN Inhale 2 L into the lungs continuous      metoprolol tartrate (LOPRESSOR) 50 MG tablet Take 0.5 tablets by mouth 2 times daily 180 tablet 1    oxyCODONE (OXYCONTIN) 20 MG extended release tablet Take 20 mg by mouth every 12 hours.       levothyroxine (SYNTHROID) 137 MCG tablet Take 1 tablet by mouth Daily 30 tablet 3    NOVOLOG FLEXPEN 100 UNIT/ML injection pen 30 Units 3 times daily (before meals) Sliding scale      simvastatin (ZOCOR) 20 MG tablet Take 1 tablet by mouth nightly 90 tablet 3    polyethylene glycol (GLYCOLAX) powder Take 17 g by mouth nightly       aspirin 81 MG EC tablet Take 81 mg by mouth daily      sulfaSALAzine (AZULFIDINE) 500 MG tablet Take 500 mg by mouth 2 times daily      calcium carbonate-vitamin D (CALCIUM 600+D) 600-200 MG-UNIT TABS Take 1 tablet by mouth 2 times daily      benztropine (COGENTIN) 1 MG tablet Take 1 mg by mouth nightly       Cholecalciferol (VITAMIN D) 2000 units CAPS capsule Take 1 capsule by mouth daily (with breakfast)      hydroxychloroquine (PLAQUENIL) 200 MG tablet Take 200 mg by mouth 2 times daily      folic acid (FOLVITE) 1 MG tablet Take 1 mg by mouth daily      lurasidone (LATUDA) 40 MG TABS tablet Take 40 mg by mouth Daily with supper       leflunomide (ARAVA) 20 MG tablet Take 20 mg by mouth daily      albuterol (ACCUNEB) 1.25 MG/3ML nebulizer solution Inhale 1 ampule into the lungs every 6 hours as needed for Wheezing      mometasone-formoterol (DULERA) 200-5 MCG/ACT inhaler Inhale 2 puffs into the lungs every 12 hours      omega-3 acid ethyl esters (LOVAZA) 1 g capsule Take 2 g by mouth 2 times daily       loratadine (CLARITIN) 10 MG tablet Take 10 mg by mouth daily as needed       oxyCODONE-acetaminophen (PERCOCET)  MG per tablet Take 1 tablet by mouth every 4 hours as needed for Pain . Earliest Fill Date: 6/8/17 100 tablet 0    tamoxifen (NOLVADEX) 20 MG tablet Take 1 tablet by mouth daily (Patient taking differently: Take 20 mg by mouth nightly ) 90 tablet 6    Elastic Bandages & Supports (FUTURO SUPPORT GLOVE MEDIUM) MISC 1 ready made support glove 1 each 2    Compression Sleeve MISC by Does not apply route 1 sleeve, 20-30 mmHg 1 each 2    ALPRAZolam (XANAX) 1 MG tablet Take 1 mg by mouth 2 times daily .  LYRICA 50 MG capsule TAKE 1 CAPSULE BY MOUTH TWICE DAILY (Patient taking differently: 1 tab BID) 60 capsule 0    diclofenac sodium (VOLTAREN) 1 % GEL Apply 2 g topically 4 times daily as needed. 100 g 4    montelukast (SINGULAIR) 10 MG tablet Take 10 mg by mouth daily.  duloxetine (CYMBALTA) 60 MG capsule Take 60 mg by mouth 2 times daily.  omeprazole (PRILOSEC) 20 MG delayed release capsule Take 20 mg by mouth 2 times daily      fluticasone (FLONASE) 50 MCG/ACT nasal spray 1 spray by Nasal route daily (Patient taking differently: 1 spray by Nasal route daily ) 1 Bottle 0     No facility-administered medications prior to visit. Patient's past medical history, surgical history, family history, medications,  and allergies  were all reviewed and updated as appropriate today. Review of Systems   Constitutional: Negative for appetite change, fatigue and fever. HENT: Negative for congestion. Respiratory: Negative for chest tightness and shortness of breath. Cardiovascular: Negative for chest pain. Gastrointestinal: Positive for abdominal pain. Negative for constipation, diarrhea, nausea and vomiting. Musculoskeletal: Positive for joint swelling. Skin: Negative for rash. /80   Pulse 76   Wt 188 lb 12.8 oz (85.6 kg)   BMI 32.41 kg/m²   Physical Exam   Constitutional: She is oriented to person, place, and time. She appears well-developed and well-nourished. She is cooperative. She does not appear ill. No distress. HENT:   Head: Normocephalic. Right Ear: Tympanic membrane, external ear and ear canal normal.   Left Ear: Tympanic membrane, external ear and ear canal normal.   Nose: Nose normal. No mucosal edema or rhinorrhea. Mouth/Throat: Oropharynx is clear and moist and mucous membranes are normal. Oral lesions present. Ulceration on right tongue    Tongue furrowed. No obvious thursh. Eyes: Conjunctivae and EOM are normal. Pupils are equal, round, and reactive to light. Right eye exhibits no discharge. Left eye exhibits no discharge. No scleral icterus. Neck: Normal range of motion. Neck supple. Carotid bruit is not present. No thyroid mass and no thyromegaly present. Cardiovascular: Normal rate, regular rhythm, normal heart sounds and intact distal pulses. No murmur heard. Pulses:       Dorsalis pedis pulses are 2+ on the right side, and 2+ on the left side. No LE edema   Pulmonary/Chest: Effort normal. She has no wheezes. She has no rales. She exhibits no tenderness. Abdominal: Soft. Bowel sounds are normal. She exhibits no mass. There is tenderness (epigastric) in the epigastric area. diastasis rectus   Musculoskeletal:        Right hand: She exhibits decreased range of motion, tenderness and swelling. Fingers swollen    Lymphedema of left arm and hand. Lymphadenopathy:     She has no cervical adenopathy. Neurological: She is alert and oriented to person, place, and time. Skin: Skin is warm and dry. No pallor. Psychiatric: She has a normal mood and affect.  Her behavior is normal. Judgment and thought content normal.       ASSESSMENT/PLAN:    Problem List

## 2018-07-04 NOTE — ASSESSMENT & PLAN NOTE
Suspect related to worsening GERD vs yeast (with elevated blood sugars puts her at increased risk). Will increase omeprazole to 40 mg po BID x 2 months, add Nystatin suspension, and refer to GI.

## 2018-07-04 NOTE — ASSESSMENT & PLAN NOTE
No obvious lesions but with elevated sugars and use of inhaled steroids, will treat for probable thrush with nystatin suspension.

## 2018-07-04 NOTE — ASSESSMENT & PLAN NOTE
Discussed findings of Barium swallow with patient and  at length. May be the cause of the burning in her throat. Increase omeprazole to 40 mg po BID x 2 months. Refer to GI as is becoming more symptomatic.

## 2018-07-09 LAB
ANION GAP SERPL CALCULATED.3IONS-SCNC: 11 MMOL/L (ref 3–16)
BUN BLDV-MCNC: 36 MG/DL (ref 7–20)
CALCIUM SERPL-MCNC: 9 MG/DL (ref 8.3–10.6)
CHLORIDE BLD-SCNC: 97 MMOL/L (ref 99–110)
CO2: 31 MMOL/L (ref 21–32)
CREAT SERPL-MCNC: 1.1 MG/DL (ref 0.6–1.1)
GFR AFRICAN AMERICAN: >60
GFR NON-AFRICAN AMERICAN: 53
GLUCOSE BLD-MCNC: 225 MG/DL (ref 70–99)
POTASSIUM SERPL-SCNC: 4.5 MMOL/L (ref 3.5–5.1)
SODIUM BLD-SCNC: 139 MMOL/L (ref 136–145)

## 2018-07-13 ENCOUNTER — TELEPHONE (OUTPATIENT)
Dept: CARDIOLOGY CLINIC | Age: 48
End: 2018-07-13

## 2018-07-13 RX ORDER — LEVOTHYROXINE SODIUM 137 UG/1
TABLET ORAL
Qty: 30 TABLET | Refills: 2 | Status: SHIPPED | OUTPATIENT
Start: 2018-07-13 | End: 2018-10-11 | Stop reason: SDUPTHER

## 2018-07-13 NOTE — TELEPHONE ENCOUNTER
Spoke with Kayla Lyons  Today's weight is 191.2 with SOBZach CARIAS 6/27/18 OV  Plan:  Lasix 120 mg IV today  Take metolazone every Monday unless weight < 185  If weight goes down too low let us know. Recommend using cpap if tolerated. Daily weights first thing in the mornings.   Continue current therapy and weekly labs  RTO in 1 month July 30, 2018 @ 11:45 am.

## 2018-07-16 ENCOUNTER — TELEPHONE (OUTPATIENT)
Dept: CARDIOLOGY CLINIC | Age: 48
End: 2018-07-16

## 2018-07-16 ENCOUNTER — TELEPHONE (OUTPATIENT)
Dept: ENDOCRINOLOGY | Age: 48
End: 2018-07-16

## 2018-07-16 LAB
ANION GAP SERPL CALCULATED.3IONS-SCNC: 13 MMOL/L (ref 3–16)
BUN BLDV-MCNC: 27 MG/DL (ref 7–20)
CALCIUM SERPL-MCNC: 9.8 MG/DL (ref 8.3–10.6)
CHLORIDE BLD-SCNC: 94 MMOL/L (ref 99–110)
CO2: 30 MMOL/L (ref 21–32)
CREAT SERPL-MCNC: 1 MG/DL (ref 0.6–1.1)
GFR AFRICAN AMERICAN: >60
GFR NON-AFRICAN AMERICAN: 59
GLUCOSE BLD-MCNC: 238 MG/DL (ref 70–99)
POTASSIUM SERPL-SCNC: 5.3 MMOL/L (ref 3.5–5.1)
PRO-BNP: 269 PG/ML (ref 0–124)
SODIUM BLD-SCNC: 137 MMOL/L (ref 136–145)

## 2018-07-18 ENCOUNTER — TELEPHONE (OUTPATIENT)
Dept: CARDIOLOGY CLINIC | Age: 48
End: 2018-07-18

## 2018-07-20 NOTE — TELEPHONE ENCOUNTER
She was in the ED yesterday for a different reason and there did not seem to be any issues with swelling. So I would just continue the same plan. Her weight fluctuates, so hopefully has come back down by now.   ARETHA

## 2018-07-25 ENCOUNTER — TELEPHONE (OUTPATIENT)
Dept: CARDIOLOGY CLINIC | Age: 48
End: 2018-07-25

## 2018-07-25 LAB
ANION GAP SERPL CALCULATED.3IONS-SCNC: 11 MMOL/L (ref 3–16)
BUN BLDV-MCNC: 34 MG/DL (ref 7–20)
CALCIUM SERPL-MCNC: 9.1 MG/DL (ref 8.3–10.6)
CHLORIDE BLD-SCNC: 99 MMOL/L (ref 99–110)
CO2: 32 MMOL/L (ref 21–32)
CREAT SERPL-MCNC: 1.8 MG/DL (ref 0.6–1.1)
GFR AFRICAN AMERICAN: 36
GFR NON-AFRICAN AMERICAN: 30
GLUCOSE BLD-MCNC: 165 MG/DL (ref 70–99)
POTASSIUM SERPL-SCNC: 4.7 MMOL/L (ref 3.5–5.1)
PRO-BNP: 125 PG/ML (ref 0–124)
SODIUM BLD-SCNC: 142 MMOL/L (ref 136–145)

## 2018-07-25 NOTE — TELEPHONE ENCOUNTER
How is she supposed to take the torsemide? I do not see it on her med list. How often is she taking the metolazone?

## 2018-07-30 ENCOUNTER — HOSPITAL ENCOUNTER (OUTPATIENT)
Dept: OTHER | Age: 48
Discharge: HOME | End: 2018-07-30
Attending: INTERNAL MEDICINE | Admitting: INTERNAL MEDICINE

## 2018-07-30 ENCOUNTER — OFFICE VISIT (OUTPATIENT)
Dept: CARDIOLOGY CLINIC | Age: 48
End: 2018-07-30

## 2018-07-30 VITALS
OXYGEN SATURATION: 98 % | DIASTOLIC BLOOD PRESSURE: 72 MMHG | SYSTOLIC BLOOD PRESSURE: 128 MMHG | HEIGHT: 64 IN | BODY MASS INDEX: 32.29 KG/M2 | HEART RATE: 80 BPM | WEIGHT: 189.12 LBS

## 2018-07-30 DIAGNOSIS — I50.30 (HFPEF) HEART FAILURE WITH PRESERVED EJECTION FRACTION (HCC): Chronic | ICD-10-CM

## 2018-07-30 DIAGNOSIS — I50.30 (HFPEF) HEART FAILURE WITH PRESERVED EJECTION FRACTION (HCC): Primary | Chronic | ICD-10-CM

## 2018-07-30 DIAGNOSIS — R06.02 SOB (SHORTNESS OF BREATH): Chronic | ICD-10-CM

## 2018-07-30 DIAGNOSIS — I25.10 CORONARY ARTERY DISEASE INVOLVING NATIVE CORONARY ARTERY OF NATIVE HEART WITHOUT ANGINA PECTORIS: Chronic | ICD-10-CM

## 2018-07-30 DIAGNOSIS — I10 ESSENTIAL HYPERTENSION: Chronic | ICD-10-CM

## 2018-07-30 LAB
A/G RATIO: 1.1 (ref 1.1–2.2)
ALBUMIN SERPL-MCNC: 4.1 G/DL (ref 3.4–5)
ALP BLD-CCNC: 71 U/L (ref 40–129)
ALT SERPL-CCNC: 11 U/L (ref 10–40)
ANION GAP SERPL CALCULATED.3IONS-SCNC: 14 MMOL/L (ref 3–16)
AST SERPL-CCNC: 17 U/L (ref 15–37)
BASOPHILS ABSOLUTE: 0.2 K/UL (ref 0–0.2)
BASOPHILS RELATIVE PERCENT: 1.6 %
BILIRUB SERPL-MCNC: <0.2 MG/DL (ref 0–1)
BUN BLDV-MCNC: 46 MG/DL (ref 7–20)
CALCIUM SERPL-MCNC: 9.7 MG/DL (ref 8.3–10.6)
CHLORIDE BLD-SCNC: 94 MMOL/L (ref 99–110)
CO2: 29 MMOL/L (ref 21–32)
CREAT SERPL-MCNC: 1.3 MG/DL (ref 0.6–1.1)
EOSINOPHILS ABSOLUTE: 0.2 K/UL (ref 0–0.6)
EOSINOPHILS RELATIVE PERCENT: 1.8 %
GFR AFRICAN AMERICAN: 53
GFR NON-AFRICAN AMERICAN: 43
GLOBULIN: 3.6 G/DL
GLUCOSE BLD-MCNC: 313 MG/DL (ref 70–99)
HCT VFR BLD CALC: 34.1 % (ref 36–48)
HEMOGLOBIN: 11.1 G/DL (ref 12–16)
LYMPHOCYTES ABSOLUTE: 2.3 K/UL (ref 1–5.1)
LYMPHOCYTES RELATIVE PERCENT: 23.9 %
MAGNESIUM: 2.3 MG/DL (ref 1.8–2.4)
MCH RBC QN AUTO: 28.9 PG (ref 26–34)
MCHC RBC AUTO-ENTMCNC: 32.6 G/DL (ref 31–36)
MCV RBC AUTO: 88.6 FL (ref 80–100)
MONOCYTES ABSOLUTE: 0.7 K/UL (ref 0–1.3)
MONOCYTES RELATIVE PERCENT: 7.4 %
NEUTROPHILS ABSOLUTE: 6.1 K/UL (ref 1.7–7.7)
NEUTROPHILS RELATIVE PERCENT: 65.3 %
PDW BLD-RTO: 13.8 % (ref 12.4–15.4)
PLATELET # BLD: 234 K/UL (ref 135–450)
PMV BLD AUTO: 10.5 FL (ref 5–10.5)
POTASSIUM SERPL-SCNC: 5.2 MMOL/L (ref 3.5–5.1)
PRO-BNP: 163 PG/ML (ref 0–124)
RBC # BLD: 3.85 M/UL (ref 4–5.2)
SODIUM BLD-SCNC: 137 MMOL/L (ref 136–145)
TOTAL PROTEIN: 7.7 G/DL (ref 6.4–8.2)
VITAMIN D 25-HYDROXY: 53.7 NG/ML
WBC # BLD: 9.4 K/UL (ref 4–11)

## 2018-07-30 PROCEDURE — G8598 ASA/ANTIPLAT THER USED: HCPCS | Performed by: INTERNAL MEDICINE

## 2018-07-30 PROCEDURE — G8427 DOCREV CUR MEDS BY ELIG CLIN: HCPCS | Performed by: INTERNAL MEDICINE

## 2018-07-30 PROCEDURE — G8417 CALC BMI ABV UP PARAM F/U: HCPCS | Performed by: INTERNAL MEDICINE

## 2018-07-30 PROCEDURE — 1036F TOBACCO NON-USER: CPT | Performed by: INTERNAL MEDICINE

## 2018-07-30 PROCEDURE — 99214 OFFICE O/P EST MOD 30 MIN: CPT | Performed by: INTERNAL MEDICINE

## 2018-07-30 NOTE — PROGRESS NOTES
hypoperfusion. She was IV diuresed. Renal panel has improved and discharged stable. Has a history of ESBL Klebsiella. She was started on Meropenem. US abd noted full bladder, no obstructive pattern appreciated. Acute kidney injury, resolved, ?  Cardiorenal + congestion, we held ACE inhibitor, nephrology followed. She then had volume overload, unclear etiology,?  New Diastolic heart failure, her last echo showed normal diastolic function, we did strict I's and O's, daily weights, held ACE inhibitor. Was discharged on oral diuretics. Urinary tract infection, complicated, bacterial, ESBL, received Merrem for 10 days, discussed with ID and was switched to Levaquin orally and continue for 7 days after D/C, now resolved  CT scan didn't show any complications. She has diabetes, on Amaryl + Lantus + lispro + sliding scale, we watch carefully for signs of hypoglycemia.  Patient's blood sugar was initially running on the low side, we adjusted her regimin. Today, she is not feeling well. She has been having bad days similar to previously. CP yesterday and took NTGx 1 and pain was resolved. Her swelling and weight is up. She has been taking torsemide 100/50 mg, metolazone once a week or an extra one if weight is > 185 pounds. Had labs drawn today. but not needed since last seen, lasix, spitonolactone and sometimes she will take diuretics and doesn't urinate much other days she takes the same and urinates well. Her appetite is poor. She feels sleepy today and has over the past couple of days and is not eating or drinking much. She was brought to the ED and found to be dehydrated. We discussed that according to her labs from 7/25/18 she looks be dehydrated.        Allergies   Allergen Reactions    Iv Dye [Iodides] Anaphylaxis     allergic to ct scan dye, not the MRI    Basaglar Kwikpen [Insulin Glargine]      High blood sugar     Trazodone And Nefazodone Other (See Comments)     Makes patient feel very sluggish     Current tablet 3    polyethylene glycol (GLYCOLAX) powder Take 17 g by mouth nightly       aspirin 81 MG EC tablet Take 81 mg by mouth daily      sulfaSALAzine (AZULFIDINE) 500 MG tablet Take 500 mg by mouth 2 times daily      calcium carbonate-vitamin D (CALCIUM 600+D) 600-200 MG-UNIT TABS Take 1 tablet by mouth 2 times daily      benztropine (COGENTIN) 1 MG tablet Take 1 mg by mouth nightly       Cholecalciferol (VITAMIN D) 2000 units CAPS capsule Take 1 capsule by mouth daily (with breakfast)      hydroxychloroquine (PLAQUENIL) 200 MG tablet Take 200 mg by mouth 2 times daily      folic acid (FOLVITE) 1 MG tablet Take 1 mg by mouth daily      lurasidone (LATUDA) 40 MG TABS tablet Take 40 mg by mouth Daily with supper       leflunomide (ARAVA) 20 MG tablet Take 20 mg by mouth daily      albuterol (ACCUNEB) 1.25 MG/3ML nebulizer solution Inhale 1 ampule into the lungs every 6 hours as needed for Wheezing      mometasone-formoterol (DULERA) 200-5 MCG/ACT inhaler Inhale 2 puffs into the lungs every 12 hours      omega-3 acid ethyl esters (LOVAZA) 1 g capsule Take 2 g by mouth 2 times daily       loratadine (CLARITIN) 10 MG tablet Take 10 mg by mouth daily as needed       fluticasone (FLONASE) 50 MCG/ACT nasal spray 1 spray by Nasal route daily (Patient taking differently: 1 spray by Nasal route daily ) 1 Bottle 0    oxyCODONE-acetaminophen (PERCOCET)  MG per tablet Take 1 tablet by mouth every 4 hours as needed for Pain . Earliest Fill Date: 6/8/17 100 tablet 0    tamoxifen (NOLVADEX) 20 MG tablet Take 1 tablet by mouth daily (Patient taking differently: Take 20 mg by mouth nightly ) 90 tablet 6    Elastic Bandages & Supports (FUTURO SUPPORT GLOVE MEDIUM) MISC 1 ready made support glove 1 each 2    Compression Sleeve MISC by Does not apply route 1 sleeve, 20-30 mmHg 1 each 2    ALPRAZolam (XANAX) 1 MG tablet Take 1 mg by mouth 2 times daily .       LYRICA 50 MG capsule TAKE 1 CAPSULE BY MOUTH TWICE DAILY (Patient taking differently: 1 tab BID) 60 capsule 0    diclofenac sodium (VOLTAREN) 1 % GEL Apply 2 g topically 4 times daily as needed. 100 g 4    montelukast (SINGULAIR) 10 MG tablet Take 10 mg by mouth daily.  duloxetine (CYMBALTA) 60 MG capsule Take 60 mg by mouth 2 times daily. No current facility-administered medications for this visit.         Past Medical History:   Diagnosis Date    Anxiety     Anxiety and depression     Arthritis     not sure of specific type    Asthma     CAD (coronary artery disease)     Cancer ([facility] 2007    left breast    Cerebral artery occlusion with cerebral infarction ([facility]     CHF (congestive heart failure) ([facility] 2018    Chronic kidney disease     renal insufficency/to see Dr Ankur Luna    Chronic pain     Constipation     COPD (chronic obstructive pulmonary disease) ([facility]     Depression     Diabetes mellitus ([facility]     Diabetic polyneuropathy associated with type 2 diabetes mellitus ([facility] 2/2/2018    Dysthymia 2/2/2018    ESBL (extended spectrum beta-lactamase) producing bacteria infection 03/14/2018    urine    Fibromyalgia     Gastric ulcer, unspecified as acute or chronic, without mention of hemorrhage, perforation, or obstruction     GERD (gastroesophageal reflux disease)     HIGH CHOLESTEROL     Hypertension     Hypothyroidism     Severe persistent asthma without complication 2/2/0070    Thyroid disease     TIA (transient ischemic attack)     with occasional left leg and hand weakness     Past Surgical History:   Procedure Laterality Date    BREAST SURGERY      left mastectomy    CARPAL TUNNEL RELEASE      Bilateral    FINGER CONTRACTURE SURGERY      HYSTERECTOMY      TONSILLECTOMY       Family History   Problem Relation Age of Onset    Asthma Other     Cancer Other     Depression Other     Diabetes Other     Hypertension Other     High Cholesterol Other     Migraines Other     Heart Attack Father 72 07/03/18 118/80     Constitutional and General Appearance:   WD/WN who is looking more puffy than before  HEENT:  NC/AT  XANDER  No problems with hearing  Skin:  Warm, dry  Respiratory:  · Normal excursion and expansion without use of accessory muscles  · Resp Auscultation: Normal breath sounds without dullness  Cardiovascular:  · The apical impulses not displaced  · Heart tones are crisp and normal  · Cervical veins are not engorged  · The carotid upstroke is normal in amplitude and contour without delay or bruit  · JVP < 8 cm H2O  RRR with nl S1 and S2 without m,r,g  · Peripheral pulses are symmetrical and full  · There is no clubbing, cyanosis of the extremities. · Trace bilateral edema, improved  · Femoral Arteries: 2+ and equal  · Pedal Pulses: 2+ and equal   Neck:  · No thyromegaly  Abdomen:  abdominal girth soft  · No masses or tenderness  · Liver/Spleen: No Abnormalities Noted  Neurological/Psychiatric:  · Alert and oriented in all spheres  · Moves all extremities well  · Exhibits normal gait balance and coordination  · No abnormalities of mood, affect, memory, mentation, or behavior are noted        Diagnostics:  Left Heart Cath 2/27/18  Dominance : Right     LM: bifurcating, MLI  LAD: mild plaquing with small vessel disease distally   LCx: no significant disease  RCA: MLI     LVEDP: 25 mmHg   No Ao gradient     Right Heart Cath   RA: 13  RV: 47/6/16  PA:   46/19    and mean of 32  PCWP: 21 mmHg      Sats:  Ao: 92%  RA: 61%  PA: 62% (x 2)     CO/CI : 6.1 L    cxr 1/15/18  No acute cardiopulmonary disease     Echo 11/8/2017  Normal left ventricle size and systolic function with an estimated ejection   fraction of 60%.  No regional wall motion abnormalities are seen.  [place] is borderline concentric left ventricular hypertrophy.   E/e\"= 13     Stress test 11/8/2017  There is normal isotope uptake at stress and rest. There is no evidence of  myocardial ischemia or scar.  Normal LV function.  Overall findings radha's documentation has been prepared under my direction and personally reviewed by me in its entirety.   I confirm that the note above accurately reflects all work, treatment, procedures, and medical decision making performed by me.    Malia Meredith MD [facility]

## 2018-08-01 ENCOUNTER — HOSPITAL ENCOUNTER (OUTPATIENT)
Dept: OTHER | Age: 48
Discharge: HOME | End: 2018-08-31
Attending: INTERNAL MEDICINE | Admitting: INTERNAL MEDICINE

## 2018-08-01 ENCOUNTER — HOSPITAL ENCOUNTER (OUTPATIENT)
Dept: OTHER | Age: 48
Discharge: HOME | End: 2018-08-02
Attending: INTERNAL MEDICINE | Admitting: INTERNAL MEDICINE

## 2018-08-01 ENCOUNTER — TELEPHONE (OUTPATIENT)
Dept: CARDIOLOGY CLINIC | Age: 48
End: 2018-08-01

## 2018-08-01 NOTE — TELEPHONE ENCOUNTER
HHRN seeking protocol for weight gain. Med List states Spironolactone 100mg am and 50mg evening. [address] [facility] today vit d, mg, cbcd, cmp and bnp, ua c&s. Take metolazone every Monday unless weight < 185  If weight goes down too low let us know. Recommend using cpap if tolerated. Daily weights first thing in the mornings. RTO in 6-8 weeks. Please advise.

## 2018-08-02 ENCOUNTER — OFFICE VISIT (OUTPATIENT)
Dept: ENDOCRINOLOGY | Age: 48
End: 2018-08-02

## 2018-08-02 VITALS
BODY MASS INDEX: 31.79 KG/M2 | HEART RATE: 85 BPM | SYSTOLIC BLOOD PRESSURE: 118 MMHG | WEIGHT: 186.2 LBS | DIASTOLIC BLOOD PRESSURE: 82 MMHG | HEIGHT: 64 IN | OXYGEN SATURATION: 100 %

## 2018-08-02 DIAGNOSIS — F32.A ANXIETY AND DEPRESSION: ICD-10-CM

## 2018-08-02 DIAGNOSIS — I50.9 CHRONIC CONGESTIVE HEART FAILURE, UNSPECIFIED CONGESTIVE HEART FAILURE TYPE: ICD-10-CM

## 2018-08-02 DIAGNOSIS — N17.9 AKI (ACUTE KIDNEY INJURY) (HCC): ICD-10-CM

## 2018-08-02 DIAGNOSIS — I50.30 (HFPEF) HEART FAILURE WITH PRESERVED EJECTION FRACTION (HCC): Chronic | ICD-10-CM

## 2018-08-02 DIAGNOSIS — G47.33 OSA (OBSTRUCTIVE SLEEP APNEA): Primary | ICD-10-CM

## 2018-08-02 DIAGNOSIS — E03.8 HYPOTHYROIDISM DUE TO HASHIMOTO'S THYROIDITIS: ICD-10-CM

## 2018-08-02 DIAGNOSIS — E11.42 DIABETIC POLYNEUROPATHY ASSOCIATED WITH TYPE 2 DIABETES MELLITUS (HCC): ICD-10-CM

## 2018-08-02 DIAGNOSIS — E06.3 HYPOTHYROIDISM DUE TO HASHIMOTO'S THYROIDITIS: ICD-10-CM

## 2018-08-02 DIAGNOSIS — F41.9 ANXIETY AND DEPRESSION: ICD-10-CM

## 2018-08-02 PROBLEM — J02.9 SORE THROAT: Status: RESOLVED | Noted: 2018-07-03 | Resolved: 2018-08-02

## 2018-08-02 LAB — HBA1C MFR BLD: 7.6 %

## 2018-08-02 PROCEDURE — 83036 HEMOGLOBIN GLYCOSYLATED A1C: CPT | Performed by: INTERNAL MEDICINE

## 2018-08-02 PROCEDURE — 2022F DILAT RTA XM EVC RTNOPTHY: CPT | Performed by: INTERNAL MEDICINE

## 2018-08-02 PROCEDURE — G8598 ASA/ANTIPLAT THER USED: HCPCS | Performed by: INTERNAL MEDICINE

## 2018-08-02 PROCEDURE — 1036F TOBACCO NON-USER: CPT | Performed by: INTERNAL MEDICINE

## 2018-08-02 PROCEDURE — G8417 CALC BMI ABV UP PARAM F/U: HCPCS | Performed by: INTERNAL MEDICINE

## 2018-08-02 PROCEDURE — G8427 DOCREV CUR MEDS BY ELIG CLIN: HCPCS | Performed by: INTERNAL MEDICINE

## 2018-08-02 PROCEDURE — 99215 OFFICE O/P EST HI 40 MIN: CPT | Performed by: INTERNAL MEDICINE

## 2018-08-02 PROCEDURE — 3045F PR MOST RECENT HEMOGLOBIN A1C LEVEL 7.0-9.0%: CPT | Performed by: INTERNAL MEDICINE

## 2018-08-02 RX ORDER — TORSEMIDE 100 MG/1
TABLET ORAL
COMMUNITY
End: 2018-08-07 | Stop reason: SDUPTHER

## 2018-08-02 RX ORDER — INSULIN ASPART 100 [IU]/ML
30 INJECTION, SOLUTION INTRAVENOUS; SUBCUTANEOUS
Qty: 5 PEN | Refills: 3 | Status: SHIPPED | OUTPATIENT
Start: 2018-08-02 | End: 2018-11-24 | Stop reason: SDUPTHER

## 2018-08-02 NOTE — PROGRESS NOTES
Georgia Andujar is a 51 y.o. female is seen for the management of type 2 diabetes and hypothyroidism . Patient has complex medical problems including coronary artery disease , DOMENIC, CHF, breast cancer, fibromyalgia, hyperlipidemia, hypertension, obesity, asthma and depression  T2DM which is uncontrolled and is complicated with nephropathy and DOMENIC . She got diabetic education in the past and at one point she was on an insulin pump. She still has hypoglycemia awareness tends to watch her diet somewhat but her depression hinders in managing her diabetes. Most of her medications are managed by her . Isabel Barron was diagnosed with hypothyroidism in march 2010 and has been on Lt4 since then   she had GDM with her 2 kids ---she has been on insulin for years she has been suffering form DAN and Peripheral neuropathy alonng with Depression and has been on Lyrica & Cymbalta with suoptimal relief she has s/s suggestive of DAN gastroparesis , orthostasis. Isabel Barron also has hypertension , GERD , hyperlipidemia ,she also has breast cancer s/p mastectomy and radiation and chemo &is on Tamixifen   she also has Asthma she is on Cpap for sleep apnea      INTERIM:    Diabetes   She presents for her follow-up diabetic visit. She has type 2 diabetes mellitus. No MedicAlert identification noted. The initial diagnosis of diabetes was made 17 years ago. Her disease course has been stable. Hypoglycemia symptoms include nervousness/anxiousness and sweats. Pertinent negatives for hypoglycemia include no dizziness or headaches. Associated symptoms include fatigue and weakness. There are no hypoglycemic complications. Symptoms are stable. Diabetic complications include autonomic neuropathy, heart disease and peripheral neuropathy. Risk factors for coronary artery disease include diabetes mellitus, post-menopausal, hypertension and dyslipidemia.  Current diabetic treatment includes intensive insulin program. She is compliant with treatment some of the time. Her weight is stable. She is following a generally healthy diet. Meal planning includes avoidance of concentrated sweets. She has had a previous visit with a dietitian. She rarely participates in exercise. There is no change in her home blood glucose trend. Her breakfast blood glucose is taken between 8-9 am. Her breakfast blood glucose range is generally 140-180 mg/dl. Her lunch blood glucose is taken between 12-1 pm. Her lunch blood glucose range is generally 140-180 mg/dl. Her dinner blood glucose is taken between 7-8 pm. Her dinner blood glucose range is generally >200 mg/dl. An ACE inhibitor/angiotensin II receptor blocker is being taken. She sees a podiatrist.Eye exam is current.      Vikki Lopez was hospitalized in April 2018 with worsening of congestive heart failure and since her discharge she has been wearing oxygen as she was hypoxic during her stay in the hospital.  According to  this is causing her a lot of problems as she is unable to cook with oxygen on    Weight trend: stable  Prior visit with dietician: yes  Current diet: on average, 3 meals per day  Current exercise: rare    Has there been any hospitalization, surgery or major illness since the last visit?  Yes   Has there been any new school, family or social problems since the last visit?  no   Has there been any new family history of members with diabetes, heart disease, stroke, or endocrine related problems since the last visit?  no    Past Medical History:   Diagnosis Date    Anxiety     Anxiety and depression     Arthritis     not sure of specific type    Asthma     CAD (coronary artery disease)     [facility] 2007    left breast    Cerebral artery occlusion with cerebral infarction ([facility]     CHF (congestive heart failure) ([facility] 2018    Chronic kidney disease     renal insufficency/to see Dr Merna Malone    Chronic pain     Constipation     COPD (chronic obstructive pulmonary disease) ([facility] (FOLVITE) 1 MG tablet Take 1 mg by mouth daily      lurasidone (LATUDA) 40 MG TABS tablet Take 40 mg by mouth Daily with supper       leflunomide (ARAVA) 20 MG tablet Take 20 mg by mouth daily      albuterol (ACCUNEB) 1.25 MG/3ML nebulizer solution Inhale 1 ampule into the lungs every 6 hours as needed for Wheezing      mometasone-formoterol (DULERA) 200-5 MCG/ACT inhaler Inhale 2 puffs into the lungs every 12 hours      fluticasone (FLONASE) 50 MCG/ACT nasal spray 1 spray by Nasal route daily (Patient taking differently: 1 spray by Nasal route daily ) 1 Bottle 0    oxyCODONE-acetaminophen (PERCOCET)  MG per tablet Take 1 tablet by mouth every 4 hours as needed for Pain . Earliest Fill Date: 17 100 tablet 0    tamoxifen (NOLVADEX) 20 MG tablet Take 1 tablet by mouth daily (Patient taking differently: Take 20 mg by mouth nightly ) 90 tablet 6    Elastic Bandages & Supports (FUTURO SUPPORT GLOVE MEDIUM) MISC 1 ready made support glove 1 each 2    Compression Sleeve MISC by Does not apply route 1 sleeve, 20-30 mmHg 1 each 2    ALPRAZolam (XANAX) 1 MG tablet Take 1 mg by mouth 2 times daily .  LYRICA 50 MG capsule TAKE 1 CAPSULE BY MOUTH TWICE DAILY (Patient taking differently: 1 tab BID) 60 capsule 0    montelukast (SINGULAIR) 10 MG tablet Take 10 mg by mouth daily.  duloxetine (CYMBALTA) 60 MG capsule Take 60 mg by mouth 2 times daily. No current facility-administered medications for this visit.       Allergies   Allergen Reactions    Iv Dye [Iodides] Anaphylaxis     allergic to ct scan dye, not the MRI    Basaglar Kwikpen [Insulin Glargine]      High blood sugar     Trazodone And Nefazodone Other (See Comments)     Makes patient feel very sluggish     Family Status   Relation Status    Other Alive    Father     Mother Alive    Sister Alive    Brother Alive       Review of Systems  Constitutional:positive  fatigue, no fever, + recent weight gain, no recent weight loss, no changes in appetite  Eyes: no eye pain, no change in vision, no eye redness, no eye irritation, no double vision  Ears, nose, throat: no nasal congestion, no sore throat, no earache, no decrease in hearing, no hoarseness, no dry mouth, no sinus problems, no difficulty swallowing, no neck lumps, no dental problems, no mouth sores, no ringing in ears  Pulmonary: + shortness of breath, no wheezing, +dyspnea on exertion, no cough  Cardiovascular: no chest pain, +lower extremity edema, no orthopnea,  Gastrointestinal: no abdominal pain, no nausea, no vomiting, no diarrhea, no constipation, no dysphagia, no heartburn, no bloating  Genitourinary: no dysuria, no urinary incontinence, no urinary hesitancy, no urinary frequency, no feelings of urinary urgency, no nocturia  Musculoskeletal: + joint swelling, + joint stiffness,+ joint pain,  Integument/Breast: hair loss, but no skin rashes, no skin lesions, no itching, no dry skin,   , no nipple discharge  Neurological:+ numbness, + tingling, no weakness, no confusion, no headaches, + dizziness, no fainting, no tremors, no decrease in memory, +balance problems  Psychiatric: +anxiety, + depression, + insomnia, no change in personality, + emotional problems,+ stress  Hematologic/Lymphatic: no tendency for easy bleeding, no swollen lymph nodes, no tendency for easy bruising  Immunology: no seasonal allergies, no frequent infections, no frequent illnesses  Endocrine: no temperature intolerance no hirsutism,     OBJECTIVE:  /82   Pulse 85   Ht 5' 4\" (1.626 m)   Wt 186 lb 3.2 oz (84.5 kg)   SpO2 100%   BMI 31.96 kg/m²    Wt Readings from Last 3 Encounters:   08/02/18 186 lb 3.2 oz (84.5 kg)   07/30/18 189 lb 1.9 oz (85.8 kg)   07/19/18 186 lb (84.4 kg)       Constitutional: no acute distress, well appearing and well nourished  Psychiatric: oriented to person, place and time, judgement and insight and normal, recent and remote memory and intact and mood and affect are depressed   Skin: skin and subcutaneous tissue is normal without mass, normal turgor  Head and Face: examination of head and face revealed no abnormalities  Eyes: no lid or conjunctival swelling, erythema or discharge, Ears/Nose: external inspection of ears and nose revealed no abnormalities, hearing is grossly normal  Oropharynx/Mouth/Face: lips, tongue and gums are normal with no lesions, the voice quality was normal  Neck: neck is supple and symmetric, with midline trachea and no masses, thyroid is normal  Pulmonary: no increased work of breathing or signs of respiratory distress, lungs are clear to auscultation  Cardiovascular: normal heart rate and rhythm, normal S1 and S2, + edema     Musculoskeletal: normal gait and station and exam of the digits and nails are normal  Neurological: normal coordination and normal general cortical function      Lab Results   Component Value Date    LABA1C 7.6 08/02/2018    LABA1C 8.0 04/30/2018    LABA1C 8.4 01/12/2018         ASSESSMENT/PLAN:  1. Type 2 diabetes mellitus with  complication, with long-term current use of insulin (HCC)  TYPE 2 DIABETES WITH COMPLICATIONS also appears to have DAN and gastroparesis --UNCONTROLLED but improved A1c 12.8>>9.4 >>10.5 >>8.5 >>9.0 >>7.5 >>11.8>>9.5>>8.0>>9.1>>8.1>>7.3>>7.8>>7.8>>8.5>>8.0>>7.6  Most lows after meal boluses and skipping meals advised to take bolus with meal in that case so there is no hypoglycemia   She still has erratic sleeping and eating habits   Currently taking 150 units of tresiba   --- Due to her hypoxemia and MARIA D I stopped the metforminIn January 2018  --- She is currently on 10 mg of Glucotrol XL   ---Her depression has been hindering in her optimal glycemic control ---she claims to eat only one meal a day and that also has minimal carbs ?  But I m not sure if she really counts her snack at all she tends to snack on dates and throat which both can lead to elevated blood glucose   We had a lengthy discussion about these issues ---she is reliant on her  to help with diabetes care   we had a lengthy discussion about glycmeic goals and treatment options   Hypoglycemia protocol reviewed in detail with patient Patient was advised to carry glucose tablets and also have glucagon emergency kit. Provided with RX for glucagon.   -advised to have annual dilated eye exam uptodate pos retinopathy follows with Dr Hidalgo Sports   -last microalb/creat ratio elevated at >800 will recheck unable to calculate she was advised to follow with nephrology     foot exam --- she saw a podiatrist who gave her steroid shot she goes for regular follow up with her --last [facility] exam was in march 2018       2. Hypothyroidism    Hypothyroidism TSH ) 0.01 >>0.9 >>2 > 1.2 >>2.3   she is on 125 mcg daily continue on same dose   ----she had thyroid hemiagensis left lobe absent       3. Mixed hyperlipidemia  On statins   Last TG high and LDL was 115 in April 2018   Encouraged to work on      4. Hx of breast cancer  Follows with onc   On tamoxifen diet 3 modification which apparently will be stopped in 2019    5. Primary fibromyalgia  On Lyrica follows with Dr Jacqueline Ramírez     6. Malignant neoplasm of overlapping sites of left female breast, unspecified estrogen receptor status ([facility]  Breast cancer s/p surgery and chemo 2008   She is on tamoxifen which according to pt gives her pain in her arms and hence she takes percocet     7. Essential hypertension  Controlled now   Continue with current regimen    8. Coronary artery disease involving native coronary artery of native heart without angina pectoris  Stable follows with Dr Nhan Aquino     9. Lymphedema of upper extremity following lymphadenectomy  Stable     10. Dysthymia  Depression   She is on Latuda    Her dose has been adjusted   She appears to be alert and oriented to day but was very tearful    11. Asthma without complication  Stable    12.  Diabetic polyneuropathy associated with type 2 diabetes mellitus ([facility]  Stable  She is on Lyrica and was on Cymbalta in the past       Rh Arthritis   She is on methotrexate and Arava  Plaquenil is also in her med list     ?chf /?CAN   Follows with cardiology continues to have multiple admission due to worsening of congestive heart failureshe is on demadex , sprinoloacto ne and metolazone         Reviewed and/or ordered clinical lab results Yes  Reviewed and/or ordered radiology tests Yes  Reviewed and/or ordered other diagnostic tests Yes  Made a decision to obtain old records Yes  Reviewed and summarized old records Yes    I spent more than 40  minutes with patient and more than 50 % of this time was spent discussing and providing counseling and coordinating care of patient's multiple health issues            Katty Guardado was counseled regarding symptoms of current diagnosis, course and complications of disease if inadequately treated, side effects of medications, diagnosis, treatment options, and prognosis, risks, benefits, complications, and alternatives of treatment, labs, imaging and other studies and treatment targets and goals. She understands instructions and counseling. These diagnosis were discussed and reviewed with the patient including the advantages of drug therapy. She was counseled at this visit on the following: diabetes complication prevention and foot care. Return in about 3 months (around 11/2/2018).

## 2018-08-07 RX ORDER — TORSEMIDE 100 MG/1
TABLET ORAL
Qty: 135 TABLET | Refills: 0 | Status: SHIPPED | OUTPATIENT
Start: 2018-08-07 | End: 2018-10-11 | Stop reason: SDUPTHER

## 2018-08-10 ENCOUNTER — TELEPHONE (OUTPATIENT)
Dept: OTHER | Age: 48
End: 2018-08-10

## 2018-08-11 LAB
ANION GAP SERPL CALCULATED.3IONS-SCNC: 12 MMOL/L (ref 3–16)
BUN BLDV-MCNC: 25 MG/DL (ref 7–20)
CALCIUM SERPL-MCNC: 9.4 MG/DL (ref 8.3–10.6)
CHLORIDE BLD-SCNC: 98 MMOL/L (ref 99–110)
CO2: 27 MMOL/L (ref 21–32)
CREAT SERPL-MCNC: 0.9 MG/DL (ref 0.6–1.1)
GFR AFRICAN AMERICAN: >60
GFR NON-AFRICAN AMERICAN: >60
GLUCOSE BLD-MCNC: 296 MG/DL (ref 70–99)
POTASSIUM SERPL-SCNC: 5 MMOL/L (ref 3.5–5.1)
PRO-BNP: 102 PG/ML (ref 0–124)
SODIUM BLD-SCNC: 137 MMOL/L (ref 136–145)

## 2018-08-12 DIAGNOSIS — I50.30 (HFPEF) HEART FAILURE WITH PRESERVED EJECTION FRACTION (HCC): Chronic | ICD-10-CM

## 2018-08-12 DIAGNOSIS — N28.9 RENAL INSUFFICIENCY: Chronic | ICD-10-CM

## 2018-08-12 DIAGNOSIS — K59.03 CONSTIPATION DUE TO PAIN MEDICATION: ICD-10-CM

## 2018-08-12 DIAGNOSIS — I10 ESSENTIAL HYPERTENSION: ICD-10-CM

## 2018-08-12 DIAGNOSIS — I25.10 CORONARY ARTERY DISEASE DUE TO LIPID RICH PLAQUE: Chronic | ICD-10-CM

## 2018-08-12 DIAGNOSIS — I50.22 SYSTOLIC CHF, CHRONIC (HCC): ICD-10-CM

## 2018-08-12 DIAGNOSIS — R06.02 SOB (SHORTNESS OF BREATH): Chronic | ICD-10-CM

## 2018-08-12 DIAGNOSIS — I25.10 CORONARY ARTERY DISEASE INVOLVING NATIVE CORONARY ARTERY OF NATIVE HEART WITHOUT ANGINA PECTORIS: Chronic | ICD-10-CM

## 2018-08-12 DIAGNOSIS — I25.83 CORONARY ARTERY DISEASE DUE TO LIPID RICH PLAQUE: Chronic | ICD-10-CM

## 2018-08-12 DIAGNOSIS — I42.9 CARDIOMYOPATHY, UNSPECIFIED TYPE (HCC): Chronic | ICD-10-CM

## 2018-08-12 LAB
ALBUMIN SERPL-MCNC: 3.7 G/DL (ref 3.4–5)
PHOSPHORUS: 3.1 MG/DL (ref 2.5–4.9)

## 2018-08-13 ENCOUNTER — TELEPHONE (OUTPATIENT)
Dept: CARDIOLOGY CLINIC | Age: 48
End: 2018-08-13

## 2018-08-13 RX ORDER — METOLAZONE 2.5 MG/1
TABLET ORAL
Qty: 30 TABLET | Refills: 0 | Status: SHIPPED | OUTPATIENT
Start: 2018-08-13 | End: 2018-10-11 | Stop reason: SDUPTHER

## 2018-08-13 RX ORDER — DOCUSATE SODIUM -SENNOSIDES 50; 8.6 MG/1; MG/1
TABLET, COATED ORAL
Qty: 120 TABLET | Refills: 2 | Status: SHIPPED | OUTPATIENT
Start: 2018-08-13 | End: 2018-11-24 | Stop reason: SDUPTHER

## 2018-08-13 RX ORDER — METOPROLOL TARTRATE 50 MG/1
TABLET, FILM COATED ORAL
Qty: 60 TABLET | Refills: 11 | Status: SHIPPED | OUTPATIENT
Start: 2018-08-13 | End: 2019-01-17 | Stop reason: SDUPTHER

## 2018-08-15 ENCOUNTER — TELEPHONE (OUTPATIENT)
Dept: CARDIOLOGY CLINIC | Age: 48
End: 2018-08-15

## 2018-08-15 NOTE — TELEPHONE ENCOUNTER
I spoke with patient's  and he was told that her labs from 8/13 look good per [facility] NP. He states that Alonso Esparza has loss 10 pounds He verbalizes understanding.

## 2018-08-17 ENCOUNTER — TELEPHONE (OUTPATIENT)
Dept: CARDIOLOGY CLINIC | Age: 48
End: 2018-08-17

## 2018-08-17 NOTE — TELEPHONE ENCOUNTER
I spoke with Violeta Zuniga. She was calling back to see if ARETHA had answered question of Metolazone. I told her that ARETHA stated that it's ok to give an extra dose. Nurse verbalized understanding.

## 2018-08-23 ENCOUNTER — TELEPHONE (OUTPATIENT)
Dept: CARDIOLOGY | Age: 48
End: 2018-08-23

## 2018-08-23 LAB
ANION GAP SERPL CALCULATED.3IONS-SCNC: 13 MMOL/L (ref 3–16)
BUN BLDV-MCNC: 36 MG/DL (ref 7–20)
CALCIUM SERPL-MCNC: 9.1 MG/DL (ref 8.3–10.6)
CHLORIDE BLD-SCNC: 92 MMOL/L (ref 99–110)
CO2: 30 MMOL/L (ref 21–32)
CREAT SERPL-MCNC: 1.2 MG/DL (ref 0.6–1.1)
GFR AFRICAN AMERICAN: 58
GFR NON-AFRICAN AMERICAN: 48
GLUCOSE BLD-MCNC: 455 MG/DL (ref 70–99)
POTASSIUM SERPL-SCNC: 4.5 MMOL/L (ref 3.5–5.1)
PRO-BNP: 151 PG/ML (ref 0–124)
SODIUM BLD-SCNC: 135 MMOL/L (ref 136–145)

## 2018-08-23 NOTE — TELEPHONE ENCOUNTER
MNAUEL Pugh Crews informed not to have patient take an extra metolazone as ARETHA wants to see labs first.  She verbalized understanding.

## 2018-08-23 NOTE — TELEPHONE ENCOUNTER
Received call from Lisa Louis ([phone]), home health nurse. She is seeing patient today and drawing labs. Weight today is 189 compared to 182 lbs beginning of the week. Patient has order for metolazone on Monday and may repeat dose if weight above 193 lbs for 2 days in a row. Patient has not reached 193 lbs shoaib yet but has increased edema and shortness of breath. Home health wants to know if patient should take extra metolazone today.

## 2018-08-24 ENCOUNTER — TELEPHONE (OUTPATIENT)
Dept: CARDIOLOGY CLINIC | Age: 48
End: 2018-08-24

## 2018-08-27 ENCOUNTER — TELEPHONE (OUTPATIENT)
Dept: CARDIOLOGY CLINIC | Age: 48
End: 2018-08-27

## 2018-08-27 ENCOUNTER — TELEPHONE (OUTPATIENT)
Dept: PULMONOLOGY | Age: 48
End: 2018-08-27

## 2018-08-27 NOTE — TELEPHONE ENCOUNTER
Pt's  called in, stated they will be going on an airplane in September, they are requesting a note from [address] stating Pt is on oxygen and will require special care on the airline. Informed  [address] does not return to office til next Tuesday, stated this is fine.      Pt # [phone]

## 2018-08-27 NOTE — LETTER
[address] Cardiology [place] [address] [address] [id number]  Phone: [phone]  Fax: [phone]    Naman Florence MD        August 28, 2018     Patient: Corinne Michael   YOB: 1970   Date of Visit: 8/27/2018       To Whom It May Concern: It is my medical opinion that [facility], be seated in the front portion   of the plane, due to congestive heart failure, and coronary artery disease. If you have any questions or concerns, please don't hesitate to call.     Sincerely,        Naman Florence MD

## 2018-08-29 LAB
ANION GAP SERPL CALCULATED.3IONS-SCNC: 14 MMOL/L (ref 3–16)
BUN BLDV-MCNC: 44 MG/DL (ref 7–20)
CALCIUM SERPL-MCNC: 9.6 MG/DL (ref 8.3–10.6)
CHLORIDE BLD-SCNC: 94 MMOL/L (ref 99–110)
CO2: 31 MMOL/L (ref 21–32)
CREAT SERPL-MCNC: 1.5 MG/DL (ref 0.6–1.1)
GFR AFRICAN AMERICAN: 45
GFR NON-AFRICAN AMERICAN: 37
GLUCOSE BLD-MCNC: 84 MG/DL (ref 70–99)
POTASSIUM SERPL-SCNC: 4.8 MMOL/L (ref 3.5–5.1)
PRO-BNP: 87 PG/ML (ref 0–124)
SODIUM BLD-SCNC: 139 MMOL/L (ref 136–145)

## 2018-09-01 ENCOUNTER — HOSPITAL ENCOUNTER (OUTPATIENT)
Dept: OTHER | Age: 48
Discharge: HOME | End: 2018-09-01
Attending: INTERNAL MEDICINE | Admitting: INTERNAL MEDICINE

## 2018-09-04 RX ORDER — MONTELUKAST SODIUM 10 MG/1
10 TABLET ORAL DAILY
Qty: 30 TABLET | Refills: 5 | Status: SHIPPED | OUTPATIENT
Start: 2018-09-04 | End: 2019-03-26 | Stop reason: SDUPTHER

## 2018-09-05 LAB
ANION GAP SERPL CALCULATED.3IONS-SCNC: 13 MMOL/L (ref 3–16)
BUN BLDV-MCNC: 30 MG/DL (ref 7–20)
CALCIUM SERPL-MCNC: 9.1 MG/DL (ref 8.3–10.6)
CHLORIDE BLD-SCNC: 97 MMOL/L (ref 99–110)
CO2: 32 MMOL/L (ref 21–32)
CREAT SERPL-MCNC: 1.3 MG/DL (ref 0.6–1.1)
GFR AFRICAN AMERICAN: 53
GFR NON-AFRICAN AMERICAN: 43
GLUCOSE BLD-MCNC: 252 MG/DL (ref 70–99)
POTASSIUM SERPL-SCNC: 5.1 MMOL/L (ref 3.5–5.1)
PRO-BNP: 131 PG/ML (ref 0–124)
SODIUM BLD-SCNC: 142 MMOL/L (ref 136–145)

## 2018-09-06 ENCOUNTER — TELEPHONE (OUTPATIENT)
Dept: CARDIOLOGY CLINIC | Age: 48
End: 2018-09-06

## 2018-09-06 ENCOUNTER — TELEPHONE (OUTPATIENT)
Dept: INTERNAL MEDICINE CLINIC | Age: 48
End: 2018-09-06

## 2018-09-06 NOTE — TELEPHONE ENCOUNTER
Mr Maricel Tracy stopped by the office and dropped off paper work to be filled out for flying and needing to be in the first row of the plane. Gave to Ernst.   Thanks

## 2018-09-07 RX ORDER — SIMVASTATIN 20 MG
20 TABLET ORAL NIGHTLY
Qty: 90 TABLET | Refills: 3 | Status: SHIPPED | OUTPATIENT
Start: 2018-09-07 | End: 2019-09-22 | Stop reason: SDUPTHER

## 2018-09-07 NOTE — TELEPHONE ENCOUNTER
Detailed message left on a/m to check why rx was d/c by Dr Piter Barone. If done by error,okd by Dr Dalia Marshall to refill.

## 2018-09-11 RX ORDER — GLIPIZIDE 10 MG/1
TABLET, FILM COATED, EXTENDED RELEASE ORAL
Qty: 30 TABLET | Refills: 2 | Status: SHIPPED | OUTPATIENT
Start: 2018-09-11 | End: 2018-12-26 | Stop reason: SDUPTHER

## 2018-09-12 ENCOUNTER — OFFICE VISIT (OUTPATIENT)
Dept: CARDIOLOGY CLINIC | Age: 48
End: 2018-09-12

## 2018-09-12 VITALS
SYSTOLIC BLOOD PRESSURE: 94 MMHG | HEART RATE: 76 BPM | DIASTOLIC BLOOD PRESSURE: 60 MMHG | WEIGHT: 175 LBS | BODY MASS INDEX: 30.04 KG/M2

## 2018-09-12 DIAGNOSIS — I25.10 CORONARY ARTERY DISEASE INVOLVING NATIVE CORONARY ARTERY OF NATIVE HEART WITHOUT ANGINA PECTORIS: Chronic | ICD-10-CM

## 2018-09-12 DIAGNOSIS — R06.02 SOB (SHORTNESS OF BREATH): Chronic | ICD-10-CM

## 2018-09-12 DIAGNOSIS — I50.30 (HFPEF) HEART FAILURE WITH PRESERVED EJECTION FRACTION (HCC): Primary | Chronic | ICD-10-CM

## 2018-09-12 DIAGNOSIS — I10 ESSENTIAL HYPERTENSION: Chronic | ICD-10-CM

## 2018-09-12 PROCEDURE — 1036F TOBACCO NON-USER: CPT | Performed by: INTERNAL MEDICINE

## 2018-09-12 PROCEDURE — G8598 ASA/ANTIPLAT THER USED: HCPCS | Performed by: INTERNAL MEDICINE

## 2018-09-12 PROCEDURE — G8427 DOCREV CUR MEDS BY ELIG CLIN: HCPCS | Performed by: INTERNAL MEDICINE

## 2018-09-12 PROCEDURE — G8417 CALC BMI ABV UP PARAM F/U: HCPCS | Performed by: INTERNAL MEDICINE

## 2018-09-12 PROCEDURE — 99215 OFFICE O/P EST HI 40 MIN: CPT | Performed by: INTERNAL MEDICINE

## 2018-09-12 NOTE — PROGRESS NOTES
[address]  Advanced CHF/Pulmonary Hypertension   Cardiac Follow up       Clabe Severin  YOB: 1970    Date of Visit:  9/12/18    Chief Complaint   Patient presents with    Congestive Heart Failure     stopped breathing and went to [facility] ER last night   drooping mouth     History of Present Illness:  THIS 51 y.o. female is seen for chronic dCHF, CAD with cardiac cath 2014 at [facility] showing diffuse LAD disease and small vessel disease with normal RCA and LCX (treated medically).  Breast cancer 8 years ago s/p mastectomy.  LUIS with cpap non compliant.     Her cardiac cath in 2014 showed diffuse LAD disease . She has had issues with fluid overload but multiple echos have shown normal EF and diastolic function. Cardiac MRI performed showed normal LVEF 61% and no evidence of constriction. She was hospitalized 1/12-1/19/18 CXR showed mild pulmonary edema, proBNP 277. VQ neg for PE and flu negative. She was treated for exacerbation of asthma and HFpEF. Ten years ago she had radiation for breast cancer. She follows with endocrinology Dr. Sommer Villatoro. She had a LHC/RHC  2/28/18 to r/o pericardial constriction as cause for her frequent episodes of fluid overload due to her history of radiation therapy to left breast. Also was looking for restriction that could cause fluid build up as well. No evidence found. Coronaries show small vessel CAD due to diabetes. No constriction or restriction. PCWP is about 21 and normal.    On 3/13/2018 she was admitted to [facility] from infusion center with fluid overload for decompensated HF. Hospital course included her receiving lasix gtt for diuresis. She was complicated by acute on chronic renal failure likely due to CHF and hypoperfusion. She was IV diuresed. Renal panel has improved and discharged stable. Has a history of ESBL Klebsiella. She was started on Meropenem. US abd noted full bladder, no obstructive pattern appreciated. Acute kidney injury, resolved, ? tablet 5    metolazone (ZAROXOLYN) 2.5 MG tablet TAKE 1 TABLET BY MOUTH ONE TIME A DAY AS NEEDED 30 tablet 0    torsemide (DEMADEX) 100 MG tablet Take 100mg morning and 50mg pm 135 tablet 0    Insulin Degludec 200 UNIT/ML SOPN Take 150 units daily 5 pen 3    NOVOLOG FLEXPEN 100 UNIT/ML injection pen Inject 30 Units into the skin 3 times daily (before meals) Sliding scale 5 pen 3    levothyroxine (SYNTHROID) 137 MCG tablet TAKE 1 TABLET BY MOUTH ONE TIME A DAY  30 tablet 2    spironolactone (ALDACTONE) 50 MG tablet 100mg morning, 50mg evening 270 tablet 3    ferrous sulfate 325 (65 Fe) MG tablet Take 1 tablet by mouth 2 times daily (with meals) 180 tablet 3    OXYGEN Inhale 2 L into the lungs continuous      metoprolol tartrate (LOPRESSOR) 50 MG tablet Take 0.5 tablets by mouth 2 times daily 180 tablet 1    oxyCODONE (OXYCONTIN) 20 MG extended release tablet Take 20 mg by mouth every 12 hours.       aspirin 81 MG EC tablet Take 81 mg by mouth daily      sulfaSALAzine (AZULFIDINE) 500 MG tablet Take 500 mg by mouth 2 times daily      calcium carbonate-vitamin D (CALCIUM 600+D) 600-200 MG-UNIT TABS Take 1 tablet by mouth 2 times daily      benztropine (COGENTIN) 1 MG tablet Take 1 mg by mouth nightly       Cholecalciferol (VITAMIN D) 2000 units CAPS capsule Take 1 capsule by mouth daily (with breakfast)      hydroxychloroquine (PLAQUENIL) 200 MG tablet Take 200 mg by mouth 2 times daily      folic acid (FOLVITE) 1 MG tablet Take 1 mg by mouth daily      lurasidone (LATUDA) 40 MG TABS tablet Take 40 mg by mouth Daily with supper       leflunomide (ARAVA) 20 MG tablet Take 20 mg by mouth daily      albuterol (ACCUNEB) 1.25 MG/3ML nebulizer solution Inhale 1 ampule into the lungs every 6 hours as needed for Wheezing      mometasone-formoterol (DULERA) 200-5 MCG/ACT inhaler Inhale 2 puffs into the lungs every 12 hours      fluticasone (FLONASE) 50 MCG/ACT nasal spray 1 spray by Nasal route daily (Patient taking differently: 1 spray by Nasal route daily ) 1 Bottle 0    oxyCODONE-acetaminophen (PERCOCET)  MG per tablet Take 1 tablet by mouth every 4 hours as needed for Pain . Earliest Fill Date: 6/8/17 100 tablet 0    tamoxifen (NOLVADEX) 20 MG tablet Take 1 tablet by mouth daily (Patient taking differently: Take 20 mg by mouth nightly ) 90 tablet 6    ALPRAZolam (XANAX) 1 MG tablet Take 1 mg by mouth 2 times daily .  LYRICA 50 MG capsule TAKE 1 CAPSULE BY MOUTH TWICE DAILY (Patient taking differently: 1 tab BID) 60 capsule 0    duloxetine (CYMBALTA) 60 MG capsule Take 60 mg by mouth 2 times daily.  SENEXON-S 8.6-50 MG per tablet TAKE 2 TABLETS BY MOUTH TWO TIMES A DAY  120 tablet 2    metoprolol tartrate (LOPRESSOR) 50 MG tablet TAKE 1 TABLET BY MOUTH TWO TIMES A DAY  60 tablet 11    nystatin (MYCOSTATIN) 279618 UNIT/GM powder Apply 3 times daily to rash between fingers until gone x 2 weeks. 30 g 0    Magic Mouthwash (MIRACLE MOUTHWASH) Swish and spit 5 mLs 4 times daily as needed for Irritation (mouth and throat pain) 300 mL 0    Insulin Pen Needle (B-D UF III MINI PEN NEEDLES) 31G X 5 MM MISC 1 each by Does not apply route daily 450 each 1    albuterol sulfate  (90 Base) MCG/ACT inhaler Inhale 2 puffs into the lungs every 6 hours as needed for Wheezing (Patient taking differently: Inhale 2 puffs into the lungs 2 times daily ) 1 Inhaler 6    polyethylene glycol (GLYCOLAX) powder Take 17 g by mouth nightly       Elastic Bandages & Supports (FUTURO SUPPORT GLOVE MEDIUM) MISC 1 ready made support glove 1 each 2    Compression Sleeve MISC by Does not apply route 1 sleeve, 20-30 mmHg 1 each 2     No current facility-administered medications for this visit.         Past Medical History:   Diagnosis Date    Anxiety     Anxiety and depression     Arthritis     not sure of specific type    Asthma     CAD (coronary artery disease)     Cancer ([facility] [address] 2007    left breast    weight loss. There's been no change in energy level, sleep pattern, or activity level. · Eyes: No visual changes or diplopia. No scleral icterus. · ENT: No Headaches, hearing loss or vertigo. No mouth sores or sore throat. · Cardiovascular: Reviewed in HPI  · Respiratory: No cough or wheezing, no sputum production. No hematemesis. · Gastrointestinal: No abdominal pain, appetite loss, blood in stools. No change in bowel or bladder habits. · Genitourinary: No dysuria, trouble voiding, or hematuria. · Musculoskeletal:  No gait disturbance, weakness or joint complaints. · Integumentary: No rash or pruritis. · Neurological: No headache, diplopia, change in muscle strength, numbness or tingling. No change in gait, balance, coordination, mood, affect, memory, mentation, behavior. · Psychiatric: No anxiety, no depression. · Endocrine: No malaise, fatigue or temperature intolerance. No excessive thirst, fluid intake, or urination. No tremor. · Hematologic/Lymphatic: No abnormal bruising or bleeding, blood clots or swollen lymph nodes. · Allergic/Immunologic: No nasal congestion or hives.     Physical Examination:      Wt Readings from Last 3 Encounters:   09/12/18 175 lb (79.4 kg)   09/11/18 186 lb (84.4 kg)   08/02/18 186 lb 3.2 oz (84.5 kg)     BP Readings from Last 3 Encounters:   09/12/18 94/60   09/12/18 99/66   08/02/18 118/82     Constitutional and General Appearance:   WD/WN who is looking more puffy than before  HEENT:  NC/AT  XANDER  No problems with hearing  Skin:  Warm, dry  Respiratory:  · Normal excursion and expansion without use of accessory muscles  · Resp Auscultation: Normal breath sounds without dullness  Cardiovascular:  · The apical impulses not displaced  · Heart tones are crisp and normal  · Cervical veins are not engorged  · The carotid upstroke is normal in amplitude and contour without delay or bruit  · JVP < 8 cm H2O  RRR with nl S1 and S2 without m,r,g  · Peripheral pulses are

## 2018-09-17 DIAGNOSIS — K44.9 HIATAL HERNIA WITH GERD: Primary | ICD-10-CM

## 2018-09-17 DIAGNOSIS — K21.9 HIATAL HERNIA WITH GERD: Primary | ICD-10-CM

## 2018-09-17 RX ORDER — OMEPRAZOLE 20 MG/1
40 CAPSULE, DELAYED RELEASE ORAL 2 TIMES DAILY
Qty: 60 CAPSULE | Refills: 3 | Status: SHIPPED | OUTPATIENT
Start: 2018-09-17 | End: 2019-01-21 | Stop reason: SDUPTHER

## 2018-09-20 LAB
ANION GAP SERPL CALCULATED.3IONS-SCNC: 15 MMOL/L (ref 3–16)
BUN BLDV-MCNC: 48 MG/DL (ref 7–20)
CALCIUM SERPL-MCNC: 10.1 MG/DL (ref 8.3–10.6)
CHLORIDE BLD-SCNC: 99 MMOL/L (ref 99–110)
CO2: 25 MMOL/L (ref 21–32)
CREAT SERPL-MCNC: 1.1 MG/DL (ref 0.6–1.1)
GFR AFRICAN AMERICAN: >60
GFR NON-AFRICAN AMERICAN: 53
GLUCOSE BLD-MCNC: 141 MG/DL (ref 70–99)
POTASSIUM SERPL-SCNC: 3.6 MMOL/L (ref 3.5–5.1)
PRO-BNP: 125 PG/ML (ref 0–124)
SODIUM BLD-SCNC: 139 MMOL/L (ref 136–145)

## 2018-10-11 DIAGNOSIS — R06.02 SOB (SHORTNESS OF BREATH): Chronic | ICD-10-CM

## 2018-10-11 DIAGNOSIS — I50.22 SYSTOLIC CHF, CHRONIC (HCC): ICD-10-CM

## 2018-10-11 DIAGNOSIS — N28.9 RENAL INSUFFICIENCY: Chronic | ICD-10-CM

## 2018-10-11 DIAGNOSIS — I10 ESSENTIAL HYPERTENSION: ICD-10-CM

## 2018-10-11 DIAGNOSIS — I50.30 (HFPEF) HEART FAILURE WITH PRESERVED EJECTION FRACTION (HCC): Chronic | ICD-10-CM

## 2018-10-11 DIAGNOSIS — I25.10 CORONARY ARTERY DISEASE INVOLVING NATIVE CORONARY ARTERY OF NATIVE HEART WITHOUT ANGINA PECTORIS: Chronic | ICD-10-CM

## 2018-10-11 RX ORDER — LEVOTHYROXINE SODIUM 137 UG/1
TABLET ORAL
Qty: 30 TABLET | Refills: 5 | Status: SHIPPED | OUTPATIENT
Start: 2018-10-11 | End: 2019-02-12 | Stop reason: SDUPTHER

## 2018-10-11 RX ORDER — TORSEMIDE 100 MG/1
TABLET ORAL
Qty: 45 TABLET | Refills: 5 | Status: SHIPPED | OUTPATIENT
Start: 2018-10-11 | End: 2019-04-25 | Stop reason: SDUPTHER

## 2018-10-11 RX ORDER — METOLAZONE 2.5 MG/1
TABLET ORAL
Qty: 30 TABLET | Refills: 1 | Status: SHIPPED | OUTPATIENT
Start: 2018-10-11 | End: 2019-01-25 | Stop reason: SDUPTHER

## 2018-10-30 ENCOUNTER — TELEPHONE (OUTPATIENT)
Dept: CARDIOLOGY CLINIC | Age: 48
End: 2018-10-30

## 2018-10-30 NOTE — TELEPHONE ENCOUNTER
Spoke with Mr. Lawrence García,  He states that torsemide is not available in [facility]. The only thing available is Lasix. Wants to know what to do as they will need a replacement for the torsemide. He has metolazone and spironolactone on hand.

## 2018-10-30 NOTE — TELEPHONE ENCOUNTER
Dr Fransisca Pace will be back in the office tomorrow to offer suggestions  Do they have metolazone with them? And lasix with them?

## 2018-11-14 ENCOUNTER — TELEPHONE (OUTPATIENT)
Dept: CARDIOLOGY CLINIC | Age: 48
End: 2018-11-14

## 2018-11-14 NOTE — TELEPHONE ENCOUNTER
It is a high dosage, but she was on a very high dosage of torsemide and asked me for a conversion. I cannot [place] from [place]. I recommend she do whatever the doctors there recommend.     ARETHA

## 2018-11-24 DIAGNOSIS — K59.03 CONSTIPATION DUE TO PAIN MEDICATION: ICD-10-CM

## 2018-11-26 RX ORDER — DOCUSATE SODIUM -SENNOSIDES 50; 8.6 MG/1; MG/1
TABLET, COATED ORAL
Qty: 120 TABLET | Refills: 1 | Status: SHIPPED | OUTPATIENT
Start: 2018-11-26 | End: 2019-01-21 | Stop reason: SDUPTHER

## 2018-11-27 RX ORDER — INSULIN ASPART 100 [IU]/ML
INJECTION, SOLUTION INTRAVENOUS; SUBCUTANEOUS
Qty: 15 ML | Refills: 2 | Status: SHIPPED | OUTPATIENT
Start: 2018-11-27 | End: 2019-02-12 | Stop reason: SDUPTHER

## 2018-12-27 RX ORDER — GLIPIZIDE 10 MG/1
TABLET, FILM COATED, EXTENDED RELEASE ORAL
Qty: 30 TABLET | Refills: 0 | Status: SHIPPED | OUTPATIENT
Start: 2018-12-27 | End: 2019-02-12 | Stop reason: SDUPTHER

## 2019-01-07 ENCOUNTER — OFFICE VISIT (OUTPATIENT)
Dept: PULMONOLOGY | Age: 49
End: 2019-01-07
Payer: COMMERCIAL

## 2019-01-07 VITALS
DIASTOLIC BLOOD PRESSURE: 74 MMHG | SYSTOLIC BLOOD PRESSURE: 117 MMHG | HEIGHT: 64 IN | WEIGHT: 172 LBS | BODY MASS INDEX: 29.37 KG/M2 | OXYGEN SATURATION: 94 % | RESPIRATION RATE: 18 BRPM | HEART RATE: 75 BPM

## 2019-01-07 DIAGNOSIS — J45.30 MILD PERSISTENT ASTHMA WITHOUT COMPLICATION: Primary | ICD-10-CM

## 2019-01-07 DIAGNOSIS — G47.33 OSA (OBSTRUCTIVE SLEEP APNEA): ICD-10-CM

## 2019-01-07 PROCEDURE — G8598 ASA/ANTIPLAT THER USED: HCPCS | Performed by: INTERNAL MEDICINE

## 2019-01-07 PROCEDURE — G8427 DOCREV CUR MEDS BY ELIG CLIN: HCPCS | Performed by: INTERNAL MEDICINE

## 2019-01-07 PROCEDURE — G8417 CALC BMI ABV UP PARAM F/U: HCPCS | Performed by: INTERNAL MEDICINE

## 2019-01-07 PROCEDURE — G8484 FLU IMMUNIZE NO ADMIN: HCPCS | Performed by: INTERNAL MEDICINE

## 2019-01-07 PROCEDURE — 99213 OFFICE O/P EST LOW 20 MIN: CPT | Performed by: INTERNAL MEDICINE

## 2019-01-07 PROCEDURE — 1036F TOBACCO NON-USER: CPT | Performed by: INTERNAL MEDICINE

## 2019-01-16 ENCOUNTER — TELEPHONE (OUTPATIENT)
Dept: PULMONOLOGY | Age: 49
End: 2019-01-16

## 2019-01-17 ENCOUNTER — HOSPITAL ENCOUNTER (OUTPATIENT)
Dept: ONCOLOGY | Age: 49
Setting detail: INFUSION SERIES
Discharge: HOME | End: 2019-01-17
Payer: COMMERCIAL

## 2019-01-17 ENCOUNTER — OFFICE VISIT (OUTPATIENT)
Dept: CARDIOLOGY CLINIC | Age: 49
End: 2019-01-17
Payer: COMMERCIAL

## 2019-01-17 VITALS
HEART RATE: 70 BPM | WEIGHT: 180 LBS | OXYGEN SATURATION: 91 % | SYSTOLIC BLOOD PRESSURE: 96 MMHG | DIASTOLIC BLOOD PRESSURE: 56 MMHG | BODY MASS INDEX: 30.9 KG/M2

## 2019-01-17 DIAGNOSIS — I25.10 CORONARY ARTERY DISEASE DUE TO LIPID RICH PLAQUE: Chronic | ICD-10-CM

## 2019-01-17 DIAGNOSIS — I25.83 CORONARY ARTERY DISEASE DUE TO LIPID RICH PLAQUE: Chronic | ICD-10-CM

## 2019-01-17 DIAGNOSIS — R06.02 SOB (SHORTNESS OF BREATH): Chronic | ICD-10-CM

## 2019-01-17 DIAGNOSIS — M06.9 RHEUMATOID ARTHRITIS INVOLVING MULTIPLE SITES, UNSPECIFIED RHEUMATOID FACTOR PRESENCE: ICD-10-CM

## 2019-01-17 DIAGNOSIS — R60.0 LOCALIZED EDEMA: ICD-10-CM

## 2019-01-17 DIAGNOSIS — E78.5 HYPERLIPIDEMIA LDL GOAL <70: ICD-10-CM

## 2019-01-17 DIAGNOSIS — I10 ESSENTIAL HYPERTENSION: Chronic | ICD-10-CM

## 2019-01-17 DIAGNOSIS — G47.33 OSA (OBSTRUCTIVE SLEEP APNEA): ICD-10-CM

## 2019-01-17 DIAGNOSIS — I25.10 CORONARY ARTERY DISEASE INVOLVING NATIVE CORONARY ARTERY OF NATIVE HEART WITHOUT ANGINA PECTORIS: Chronic | ICD-10-CM

## 2019-01-17 DIAGNOSIS — I50.30 (HFPEF) HEART FAILURE WITH PRESERVED EJECTION FRACTION (HCC): Primary | Chronic | ICD-10-CM

## 2019-01-17 LAB
ANION GAP SERPL CALCULATED.3IONS-SCNC: 12 MMOL/L (ref 3–16)
BUN BLDV-MCNC: 32 MG/DL (ref 7–20)
CALCIUM SERPL-MCNC: 9.1 MG/DL (ref 8.3–10.6)
CHLORIDE BLD-SCNC: 93 MMOL/L (ref 99–110)
CO2: 29 MMOL/L (ref 21–32)
CREAT SERPL-MCNC: 1.1 MG/DL (ref 0.6–1.1)
GFR AFRICAN AMERICAN: >60
GFR NON-AFRICAN AMERICAN: 53
GLUCOSE BLD-MCNC: 427 MG/DL (ref 70–99)
HCT VFR BLD CALC: 34.8 % (ref 36–48)
HEMOGLOBIN: 11.2 G/DL (ref 12–16)
MCH RBC QN AUTO: 28.3 PG (ref 26–34)
MCHC RBC AUTO-ENTMCNC: 32.2 G/DL (ref 31–36)
MCV RBC AUTO: 87.7 FL (ref 80–100)
PDW BLD-RTO: 13.1 % (ref 12.4–15.4)
PLATELET # BLD: 188 K/UL (ref 135–450)
PMV BLD AUTO: 10.6 FL (ref 5–10.5)
POTASSIUM SERPL-SCNC: 4.2 MMOL/L (ref 3.5–5.1)
PRO-BNP: 122 PG/ML (ref 0–124)
RBC # BLD: 3.97 M/UL (ref 4–5.2)
SODIUM BLD-SCNC: 134 MMOL/L (ref 136–145)
WBC # BLD: 6.6 K/UL (ref 4–11)

## 2019-01-17 PROCEDURE — G8598 ASA/ANTIPLAT THER USED: HCPCS | Performed by: INTERNAL MEDICINE

## 2019-01-17 PROCEDURE — 96374 THER/PROPH/DIAG INJ IV PUSH: CPT

## 2019-01-17 PROCEDURE — 83880 ASSAY OF NATRIURETIC PEPTIDE: CPT

## 2019-01-17 PROCEDURE — G8484 FLU IMMUNIZE NO ADMIN: HCPCS | Performed by: INTERNAL MEDICINE

## 2019-01-17 PROCEDURE — 99215 OFFICE O/P EST HI 40 MIN: CPT | Performed by: INTERNAL MEDICINE

## 2019-01-17 PROCEDURE — 85027 COMPLETE CBC AUTOMATED: CPT

## 2019-01-17 PROCEDURE — 6360000002 HC RX W HCPCS

## 2019-01-17 PROCEDURE — G8427 DOCREV CUR MEDS BY ELIG CLIN: HCPCS | Performed by: INTERNAL MEDICINE

## 2019-01-17 PROCEDURE — 1036F TOBACCO NON-USER: CPT | Performed by: INTERNAL MEDICINE

## 2019-01-17 PROCEDURE — 80048 BASIC METABOLIC PNL TOTAL CA: CPT

## 2019-01-17 PROCEDURE — G8417 CALC BMI ABV UP PARAM F/U: HCPCS | Performed by: INTERNAL MEDICINE

## 2019-01-17 RX ORDER — FUROSEMIDE 10 MG/ML
80 INJECTION INTRAMUSCULAR; INTRAVENOUS ONCE
Status: COMPLETED | OUTPATIENT
Start: 2019-01-17 | End: 2019-01-17

## 2019-01-17 RX ORDER — COLCHICINE 0.6 MG/1
0.6 TABLET ORAL 2 TIMES DAILY
COMMUNITY
Start: 2019-01-08 | End: 2020-09-23 | Stop reason: ALTCHOICE

## 2019-01-17 RX ORDER — FUROSEMIDE 10 MG/ML
INJECTION INTRAMUSCULAR; INTRAVENOUS
Status: COMPLETED
Start: 2019-01-17 | End: 2019-01-17

## 2019-01-17 RX ADMIN — FUROSEMIDE 80 MG: 10 INJECTION, SOLUTION INTRAVENOUS at 11:46

## 2019-01-17 RX ADMIN — FUROSEMIDE 80 MG: 10 INJECTION INTRAMUSCULAR; INTRAVENOUS at 11:46

## 2019-01-17 NOTE — PROGRESS NOTES
Patient ambulatory to department accompanied by her . Came from Dr Ramez Morales office. Labs drawn and patient given lasix 80 mg iv push. Tolerated procedure well. To follow up with Dr Ramez Morales office. Denies need for any educational material about lasix.

## 2019-01-21 ENCOUNTER — OFFICE VISIT (OUTPATIENT)
Dept: INTERNAL MEDICINE CLINIC | Age: 49
End: 2019-01-21
Payer: COMMERCIAL

## 2019-01-21 ENCOUNTER — TELEPHONE (OUTPATIENT)
Dept: CARDIOLOGY CLINIC | Age: 49
End: 2019-01-21

## 2019-01-21 VITALS
BODY MASS INDEX: 31.17 KG/M2 | HEART RATE: 82 BPM | WEIGHT: 181.6 LBS | DIASTOLIC BLOOD PRESSURE: 64 MMHG | RESPIRATION RATE: 12 BRPM | SYSTOLIC BLOOD PRESSURE: 108 MMHG | OXYGEN SATURATION: 94 %

## 2019-01-21 DIAGNOSIS — K21.9 HIATAL HERNIA WITH GERD: ICD-10-CM

## 2019-01-21 DIAGNOSIS — D50.9 IRON DEFICIENCY ANEMIA, UNSPECIFIED IRON DEFICIENCY ANEMIA TYPE: ICD-10-CM

## 2019-01-21 DIAGNOSIS — G47.10 HYPERSOMNOLENCE: Primary | ICD-10-CM

## 2019-01-21 DIAGNOSIS — K44.9 HIATAL HERNIA WITH GERD: ICD-10-CM

## 2019-01-21 DIAGNOSIS — K59.03 CONSTIPATION DUE TO PAIN MEDICATION: ICD-10-CM

## 2019-01-21 DIAGNOSIS — J30.89 ENVIRONMENTAL AND SEASONAL ALLERGIES: ICD-10-CM

## 2019-01-21 DIAGNOSIS — E11.65 POORLY CONTROLLED TYPE 2 DIABETES MELLITUS (HCC): ICD-10-CM

## 2019-01-21 LAB
GLUCOSE BLD-MCNC: 242 MG/DL
HBA1C MFR BLD: 9.9 %

## 2019-01-21 PROCEDURE — 82962 GLUCOSE BLOOD TEST: CPT | Performed by: INTERNAL MEDICINE

## 2019-01-21 PROCEDURE — 1036F TOBACCO NON-USER: CPT | Performed by: INTERNAL MEDICINE

## 2019-01-21 PROCEDURE — G8417 CALC BMI ABV UP PARAM F/U: HCPCS | Performed by: INTERNAL MEDICINE

## 2019-01-21 PROCEDURE — 2022F DILAT RTA XM EVC RTNOPTHY: CPT | Performed by: INTERNAL MEDICINE

## 2019-01-21 PROCEDURE — G8484 FLU IMMUNIZE NO ADMIN: HCPCS | Performed by: INTERNAL MEDICINE

## 2019-01-21 PROCEDURE — 83036 HEMOGLOBIN GLYCOSYLATED A1C: CPT | Performed by: INTERNAL MEDICINE

## 2019-01-21 PROCEDURE — 3046F HEMOGLOBIN A1C LEVEL >9.0%: CPT | Performed by: INTERNAL MEDICINE

## 2019-01-21 PROCEDURE — 99214 OFFICE O/P EST MOD 30 MIN: CPT | Performed by: INTERNAL MEDICINE

## 2019-01-21 PROCEDURE — G8427 DOCREV CUR MEDS BY ELIG CLIN: HCPCS | Performed by: INTERNAL MEDICINE

## 2019-01-21 PROCEDURE — G8598 ASA/ANTIPLAT THER USED: HCPCS | Performed by: INTERNAL MEDICINE

## 2019-01-21 RX ORDER — FERROUS SULFATE 325(65) MG
325 TABLET ORAL 2 TIMES DAILY WITH MEALS
Qty: 180 TABLET | Refills: 3 | Status: SHIPPED | OUTPATIENT
Start: 2019-01-21 | End: 2020-02-19

## 2019-01-21 RX ORDER — POLYETHYLENE GLYCOL 3350 17 G/17G
17 POWDER, FOR SOLUTION ORAL NIGHTLY
Qty: 17 G | Refills: 3 | Status: SHIPPED | OUTPATIENT
Start: 2019-01-21 | End: 2020-03-25 | Stop reason: ALTCHOICE

## 2019-01-21 RX ORDER — OMEPRAZOLE 20 MG/1
40 CAPSULE, DELAYED RELEASE ORAL 2 TIMES DAILY
Qty: 60 CAPSULE | Refills: 3 | Status: SHIPPED | OUTPATIENT
Start: 2019-01-21 | End: 2019-05-31 | Stop reason: SDUPTHER

## 2019-01-21 RX ORDER — LORATADINE 10 MG/1
10 TABLET ORAL DAILY
Qty: 90 TABLET | Refills: 1 | Status: SHIPPED | OUTPATIENT
Start: 2019-01-21 | End: 2019-07-20 | Stop reason: SDUPTHER

## 2019-01-21 RX ORDER — AMOXICILLIN 250 MG
CAPSULE ORAL
Qty: 120 TABLET | Refills: 1 | Status: SHIPPED | OUTPATIENT
Start: 2019-01-21 | End: 2019-02-25 | Stop reason: SDUPTHER

## 2019-01-21 ASSESSMENT — ENCOUNTER SYMPTOMS
CHEST TIGHTNESS: 0
SHORTNESS OF BREATH: 0
CONSTIPATION: 0
DIARRHEA: 0

## 2019-01-25 DIAGNOSIS — I25.10 CORONARY ARTERY DISEASE INVOLVING NATIVE CORONARY ARTERY OF NATIVE HEART WITHOUT ANGINA PECTORIS: Chronic | ICD-10-CM

## 2019-01-25 DIAGNOSIS — I50.30 (HFPEF) HEART FAILURE WITH PRESERVED EJECTION FRACTION (HCC): Chronic | ICD-10-CM

## 2019-01-25 DIAGNOSIS — I10 ESSENTIAL HYPERTENSION: ICD-10-CM

## 2019-01-25 DIAGNOSIS — R06.02 SOB (SHORTNESS OF BREATH): Chronic | ICD-10-CM

## 2019-01-25 DIAGNOSIS — I50.22 SYSTOLIC CHF, CHRONIC (HCC): ICD-10-CM

## 2019-01-25 DIAGNOSIS — N28.9 RENAL INSUFFICIENCY: Chronic | ICD-10-CM

## 2019-01-25 RX ORDER — INSULIN DEGLUDEC 200 U/ML
INJECTION, SOLUTION SUBCUTANEOUS
Qty: 9 ML | Refills: 0 | Status: SHIPPED | OUTPATIENT
Start: 2019-01-25 | End: 2019-02-12

## 2019-01-28 ENCOUNTER — HOSPITAL ENCOUNTER (OUTPATIENT)
Age: 49
Setting detail: SPECIMEN
Discharge: HOME | End: 2019-01-28
Payer: COMMERCIAL

## 2019-01-28 LAB — PRO-BNP: 114 PG/ML (ref 0–124)

## 2019-01-28 PROCEDURE — 83880 ASSAY OF NATRIURETIC PEPTIDE: CPT

## 2019-01-28 PROCEDURE — 36415 COLL VENOUS BLD VENIPUNCTURE: CPT

## 2019-01-28 RX ORDER — METOLAZONE 2.5 MG/1
TABLET ORAL
Qty: 30 TABLET | Refills: 1 | Status: SHIPPED | OUTPATIENT
Start: 2019-01-28 | End: 2019-04-25 | Stop reason: SDUPTHER

## 2019-01-30 ENCOUNTER — TELEPHONE (OUTPATIENT)
Dept: CARDIOLOGY | Age: 49
End: 2019-01-30

## 2019-02-06 ENCOUNTER — TELEPHONE (OUTPATIENT)
Dept: CARDIOLOGY CLINIC | Age: 49
End: 2019-02-06

## 2019-02-07 ENCOUNTER — HOSPITAL ENCOUNTER (OUTPATIENT)
Age: 49
Setting detail: SPECIMEN
Discharge: HOME | End: 2019-02-07
Payer: COMMERCIAL

## 2019-02-07 LAB
ALBUMIN SERPL-MCNC: 4 G/DL (ref 3.4–5)
ANION GAP SERPL CALCULATED.3IONS-SCNC: 13 MMOL/L (ref 3–16)
BUN BLDV-MCNC: 45 MG/DL (ref 7–20)
CALCIUM SERPL-MCNC: 9.5 MG/DL (ref 8.3–10.6)
CHLORIDE BLD-SCNC: 93 MMOL/L (ref 99–110)
CO2: 29 MMOL/L (ref 21–32)
CREAT SERPL-MCNC: 1 MG/DL (ref 0.6–1.1)
GFR AFRICAN AMERICAN: >60
GFR NON-AFRICAN AMERICAN: 59
GLUCOSE BLD-MCNC: 360 MG/DL (ref 70–99)
PHOSPHORUS: 3.4 MG/DL (ref 2.5–4.9)
POTASSIUM SERPL-SCNC: 4.5 MMOL/L (ref 3.5–5.1)
SODIUM BLD-SCNC: 135 MMOL/L (ref 136–145)
URIC ACID, SERUM: 12.3 MG/DL (ref 2.6–6)

## 2019-02-07 PROCEDURE — 80069 RENAL FUNCTION PANEL: CPT

## 2019-02-07 PROCEDURE — 84550 ASSAY OF BLOOD/URIC ACID: CPT

## 2019-02-07 PROCEDURE — 36415 COLL VENOUS BLD VENIPUNCTURE: CPT

## 2019-02-11 ENCOUNTER — HOSPITAL ENCOUNTER (OUTPATIENT)
Age: 49
Setting detail: SPECIMEN
Discharge: HOME | End: 2019-02-11
Payer: COMMERCIAL

## 2019-02-11 ENCOUNTER — TELEPHONE (OUTPATIENT)
Dept: CARDIOLOGY CLINIC | Age: 49
End: 2019-02-11

## 2019-02-11 LAB
ANION GAP SERPL CALCULATED.3IONS-SCNC: 14 MMOL/L (ref 3–16)
BUN BLDV-MCNC: 32 MG/DL (ref 7–20)
CALCIUM SERPL-MCNC: 10.1 MG/DL (ref 8.3–10.6)
CHLORIDE BLD-SCNC: 93 MMOL/L (ref 99–110)
CO2: 30 MMOL/L (ref 21–32)
CREAT SERPL-MCNC: 1 MG/DL (ref 0.6–1.1)
GFR AFRICAN AMERICAN: >60
GFR NON-AFRICAN AMERICAN: 59
GLUCOSE BLD-MCNC: 55 MG/DL (ref 70–99)
POTASSIUM SERPL-SCNC: 4.6 MMOL/L (ref 3.5–5.1)
PRO-BNP: 151 PG/ML (ref 0–124)
SODIUM BLD-SCNC: 137 MMOL/L (ref 136–145)

## 2019-02-11 PROCEDURE — 83880 ASSAY OF NATRIURETIC PEPTIDE: CPT

## 2019-02-11 PROCEDURE — 80048 BASIC METABOLIC PNL TOTAL CA: CPT

## 2019-02-11 PROCEDURE — 36415 COLL VENOUS BLD VENIPUNCTURE: CPT

## 2019-02-12 ENCOUNTER — OFFICE VISIT (OUTPATIENT)
Dept: ENDOCRINOLOGY | Age: 49
End: 2019-02-12
Payer: COMMERCIAL

## 2019-02-12 VITALS
WEIGHT: 179 LBS | DIASTOLIC BLOOD PRESSURE: 72 MMHG | SYSTOLIC BLOOD PRESSURE: 100 MMHG | HEART RATE: 87 BPM | OXYGEN SATURATION: 98 % | HEIGHT: 64 IN | BODY MASS INDEX: 30.56 KG/M2

## 2019-02-12 DIAGNOSIS — I25.10 CORONARY ARTERY DISEASE INVOLVING NATIVE CORONARY ARTERY OF NATIVE HEART WITHOUT ANGINA PECTORIS: ICD-10-CM

## 2019-02-12 DIAGNOSIS — J45.50 SEVERE PERSISTENT ASTHMA WITHOUT COMPLICATION: ICD-10-CM

## 2019-02-12 DIAGNOSIS — I89.0 LYMPHEDEMA OF UPPER EXTREMITY FOLLOWING LYMPHADENECTOMY: ICD-10-CM

## 2019-02-12 DIAGNOSIS — E89.89 LYMPHEDEMA OF UPPER EXTREMITY FOLLOWING LYMPHADENECTOMY: ICD-10-CM

## 2019-02-12 PROCEDURE — 99215 OFFICE O/P EST HI 40 MIN: CPT | Performed by: INTERNAL MEDICINE

## 2019-02-12 PROCEDURE — 3046F HEMOGLOBIN A1C LEVEL >9.0%: CPT | Performed by: INTERNAL MEDICINE

## 2019-02-12 PROCEDURE — G8484 FLU IMMUNIZE NO ADMIN: HCPCS | Performed by: INTERNAL MEDICINE

## 2019-02-12 PROCEDURE — G8598 ASA/ANTIPLAT THER USED: HCPCS | Performed by: INTERNAL MEDICINE

## 2019-02-12 PROCEDURE — G8417 CALC BMI ABV UP PARAM F/U: HCPCS | Performed by: INTERNAL MEDICINE

## 2019-02-12 PROCEDURE — 1036F TOBACCO NON-USER: CPT | Performed by: INTERNAL MEDICINE

## 2019-02-12 PROCEDURE — G8427 DOCREV CUR MEDS BY ELIG CLIN: HCPCS | Performed by: INTERNAL MEDICINE

## 2019-02-12 PROCEDURE — 2022F DILAT RTA XM EVC RTNOPTHY: CPT | Performed by: INTERNAL MEDICINE

## 2019-02-12 RX ORDER — FLASH GLUCOSE SENSOR
KIT MISCELLANEOUS
Qty: 2 EACH | Refills: 5 | Status: SHIPPED | OUTPATIENT
Start: 2019-02-12 | End: 2019-08-13

## 2019-02-12 RX ORDER — GLIPIZIDE 10 MG/1
TABLET, FILM COATED, EXTENDED RELEASE ORAL
Qty: 30 TABLET | Refills: 0 | Status: SHIPPED | OUTPATIENT
Start: 2019-02-12 | End: 2019-08-28 | Stop reason: SDUPTHER

## 2019-02-12 RX ORDER — FLASH GLUCOSE SENSOR
KIT MISCELLANEOUS
Qty: 1 DEVICE | Refills: 2 | Status: SHIPPED | OUTPATIENT
Start: 2019-02-12 | End: 2019-08-13

## 2019-02-12 RX ORDER — LEVOTHYROXINE SODIUM 137 UG/1
TABLET ORAL
Qty: 30 TABLET | Refills: 5 | Status: SHIPPED | OUTPATIENT
Start: 2019-02-12 | End: 2019-02-28

## 2019-02-14 ENCOUNTER — TELEPHONE (OUTPATIENT)
Dept: ENDOCRINOLOGY | Age: 49
End: 2019-02-14

## 2019-02-18 ENCOUNTER — HOSPITAL ENCOUNTER (OUTPATIENT)
Age: 49
Setting detail: SPECIMEN
Discharge: HOME | End: 2019-02-18
Payer: COMMERCIAL

## 2019-02-18 ENCOUNTER — TELEPHONE (OUTPATIENT)
Dept: CARDIOLOGY CLINIC | Age: 49
End: 2019-02-18

## 2019-02-18 ENCOUNTER — TELEPHONE (OUTPATIENT)
Dept: CARDIOLOGY | Age: 49
End: 2019-02-18

## 2019-02-18 LAB
A/G RATIO: 1.1 (ref 1.1–2.2)
ALBUMIN SERPL-MCNC: 3.7 G/DL (ref 3.4–5)
ALP BLD-CCNC: 85 U/L (ref 40–129)
ALT SERPL-CCNC: 13 U/L (ref 10–40)
ANION GAP SERPL CALCULATED.3IONS-SCNC: 15 MMOL/L (ref 3–16)
AST SERPL-CCNC: 18 U/L (ref 15–37)
BASOPHILS ABSOLUTE: 0 K/UL (ref 0–0.2)
BASOPHILS RELATIVE PERCENT: 0.5 %
BILIRUB SERPL-MCNC: <0.2 MG/DL (ref 0–1)
BUN BLDV-MCNC: 28 MG/DL (ref 7–20)
CALCIUM SERPL-MCNC: 9.4 MG/DL (ref 8.3–10.6)
CHLORIDE BLD-SCNC: 95 MMOL/L (ref 99–110)
CHOLESTEROL, TOTAL: 177 MG/DL (ref 0–199)
CO2: 29 MMOL/L (ref 21–32)
CREAT SERPL-MCNC: 1.1 MG/DL (ref 0.6–1.1)
EOSINOPHILS ABSOLUTE: 0.1 K/UL (ref 0–0.6)
EOSINOPHILS RELATIVE PERCENT: 2.2 %
FOLATE: >20 NG/ML (ref 4.78–24.2)
GFR AFRICAN AMERICAN: >60
GFR NON-AFRICAN AMERICAN: 53
GLOBULIN: 3.4 G/DL
GLUCOSE BLD-MCNC: 182 MG/DL (ref 70–99)
HCT VFR BLD CALC: 34 % (ref 36–48)
HDLC SERPL-MCNC: 38 MG/DL (ref 40–60)
HEMOGLOBIN: 11.1 G/DL (ref 12–16)
LDL CHOLESTEROL CALCULATED: ABNORMAL MG/DL
LDL CHOLESTEROL DIRECT: 107 MG/DL
LYMPHOCYTES ABSOLUTE: 2.4 K/UL (ref 1–5.1)
LYMPHOCYTES RELATIVE PERCENT: 35.4 %
MCH RBC QN AUTO: 29 PG (ref 26–34)
MCHC RBC AUTO-ENTMCNC: 32.7 G/DL (ref 31–36)
MCV RBC AUTO: 88.8 FL (ref 80–100)
MONOCYTES ABSOLUTE: 0.5 K/UL (ref 0–1.3)
MONOCYTES RELATIVE PERCENT: 7.3 %
NEUTROPHILS ABSOLUTE: 3.7 K/UL (ref 1.7–7.7)
NEUTROPHILS RELATIVE PERCENT: 54.6 %
PDW BLD-RTO: 14 % (ref 12.4–15.4)
PLATELET # BLD: 222 K/UL (ref 135–450)
PMV BLD AUTO: 10.8 FL (ref 5–10.5)
POTASSIUM SERPL-SCNC: 3.8 MMOL/L (ref 3.5–5.1)
PRO-BNP: 103 PG/ML (ref 0–124)
RBC # BLD: 3.83 M/UL (ref 4–5.2)
SODIUM BLD-SCNC: 139 MMOL/L (ref 136–145)
T4 FREE: 0.8 NG/DL (ref 0.9–1.8)
TOTAL PROTEIN: 7.1 G/DL (ref 6.4–8.2)
TRIGL SERPL-MCNC: 409 MG/DL (ref 0–150)
TSH SERPL DL<=0.05 MIU/L-ACNC: 6.36 UIU/ML (ref 0.27–4.2)
VITAMIN B-12: 274 PG/ML (ref 211–911)
VLDLC SERPL CALC-MCNC: ABNORMAL MG/DL
WBC # BLD: 6.7 K/UL (ref 4–11)

## 2019-02-18 PROCEDURE — 83721 ASSAY OF BLOOD LIPOPROTEIN: CPT

## 2019-02-18 PROCEDURE — 82746 ASSAY OF FOLIC ACID SERUM: CPT

## 2019-02-18 PROCEDURE — 84439 ASSAY OF FREE THYROXINE: CPT

## 2019-02-18 PROCEDURE — 36415 COLL VENOUS BLD VENIPUNCTURE: CPT

## 2019-02-18 PROCEDURE — 83880 ASSAY OF NATRIURETIC PEPTIDE: CPT

## 2019-02-18 PROCEDURE — 83036 HEMOGLOBIN GLYCOSYLATED A1C: CPT

## 2019-02-18 PROCEDURE — 82607 VITAMIN B-12: CPT

## 2019-02-18 PROCEDURE — 84443 ASSAY THYROID STIM HORMONE: CPT

## 2019-02-18 PROCEDURE — 85025 COMPLETE CBC W/AUTO DIFF WBC: CPT

## 2019-02-18 PROCEDURE — 80053 COMPREHEN METABOLIC PANEL: CPT

## 2019-02-18 PROCEDURE — 80061 LIPID PANEL: CPT

## 2019-02-19 ENCOUNTER — PROCEDURE VISIT (OUTPATIENT)
Dept: ENDOCRINOLOGY | Age: 49
End: 2019-02-19
Payer: COMMERCIAL

## 2019-02-19 ENCOUNTER — TELEPHONE (OUTPATIENT)
Dept: ENDOCRINOLOGY | Age: 49
End: 2019-02-19

## 2019-02-19 DIAGNOSIS — E11.65 POORLY CONTROLLED TYPE 2 DIABETES MELLITUS (HCC): ICD-10-CM

## 2019-02-19 LAB
ESTIMATED AVERAGE GLUCOSE: 205.9 MG/DL
HBA1C MFR BLD: 8.8 %

## 2019-02-19 PROCEDURE — 95251 CONT GLUC MNTR ANALYSIS I&R: CPT | Performed by: INTERNAL MEDICINE

## 2019-02-19 PROCEDURE — 95250 CONT GLUC MNTR PHYS/QHP EQP: CPT | Performed by: INTERNAL MEDICINE

## 2019-02-21 ENCOUNTER — TELEPHONE (OUTPATIENT)
Dept: ENDOCRINOLOGY | Age: 49
End: 2019-02-21

## 2019-02-21 ENCOUNTER — HOSPITAL ENCOUNTER (OUTPATIENT)
Dept: GENERAL RADIOLOGY | Age: 49
Discharge: HOME | End: 2019-02-21
Payer: COMMERCIAL

## 2019-02-21 ENCOUNTER — OFFICE VISIT (OUTPATIENT)
Dept: CARDIOLOGY CLINIC | Age: 49
End: 2019-02-21
Payer: COMMERCIAL

## 2019-02-21 ENCOUNTER — TELEPHONE (OUTPATIENT)
Dept: CARDIOLOGY CLINIC | Age: 49
End: 2019-02-21

## 2019-02-21 VITALS
HEART RATE: 80 BPM | SYSTOLIC BLOOD PRESSURE: 126 MMHG | WEIGHT: 177 LBS | BODY MASS INDEX: 30.38 KG/M2 | DIASTOLIC BLOOD PRESSURE: 80 MMHG

## 2019-02-21 DIAGNOSIS — I10 ESSENTIAL HYPERTENSION: Chronic | ICD-10-CM

## 2019-02-21 DIAGNOSIS — I25.10 CORONARY ARTERY DISEASE DUE TO LIPID RICH PLAQUE: Chronic | ICD-10-CM

## 2019-02-21 DIAGNOSIS — R06.02 SOB (SHORTNESS OF BREATH): Chronic | ICD-10-CM

## 2019-02-21 DIAGNOSIS — I25.83 CORONARY ARTERY DISEASE DUE TO LIPID RICH PLAQUE: Chronic | ICD-10-CM

## 2019-02-21 DIAGNOSIS — C50.812 MALIGNANT NEOPLASM OF OVERLAPPING SITES OF LEFT FEMALE BREAST, UNSPECIFIED ESTROGEN RECEPTOR STATUS (HCC): ICD-10-CM

## 2019-02-21 DIAGNOSIS — I25.10 CORONARY ARTERY DISEASE INVOLVING NATIVE CORONARY ARTERY OF NATIVE HEART WITHOUT ANGINA PECTORIS: Chronic | ICD-10-CM

## 2019-02-21 DIAGNOSIS — I50.30 (HFPEF) HEART FAILURE WITH PRESERVED EJECTION FRACTION (HCC): Primary | Chronic | ICD-10-CM

## 2019-02-21 DIAGNOSIS — N28.9 RENAL INSUFFICIENCY: ICD-10-CM

## 2019-02-21 DIAGNOSIS — I42.9 CARDIOMYOPATHY, UNSPECIFIED TYPE (HCC): Chronic | ICD-10-CM

## 2019-02-21 PROCEDURE — G8598 ASA/ANTIPLAT THER USED: HCPCS | Performed by: INTERNAL MEDICINE

## 2019-02-21 PROCEDURE — 77080 DXA BONE DENSITY AXIAL: CPT

## 2019-02-21 PROCEDURE — 99214 OFFICE O/P EST MOD 30 MIN: CPT | Performed by: INTERNAL MEDICINE

## 2019-02-21 PROCEDURE — G8417 CALC BMI ABV UP PARAM F/U: HCPCS | Performed by: INTERNAL MEDICINE

## 2019-02-21 PROCEDURE — 1036F TOBACCO NON-USER: CPT | Performed by: INTERNAL MEDICINE

## 2019-02-21 PROCEDURE — G8484 FLU IMMUNIZE NO ADMIN: HCPCS | Performed by: INTERNAL MEDICINE

## 2019-02-21 PROCEDURE — G8427 DOCREV CUR MEDS BY ELIG CLIN: HCPCS | Performed by: INTERNAL MEDICINE

## 2019-02-21 RX ORDER — ALLOPURINOL 100 MG/1
100 TABLET ORAL DAILY
Qty: 30 TABLET | Refills: 5
Start: 2019-02-21 | End: 2019-05-15 | Stop reason: ALTCHOICE

## 2019-02-25 ENCOUNTER — TELEPHONE (OUTPATIENT)
Dept: ENDOCRINOLOGY | Age: 49
End: 2019-02-25

## 2019-02-25 ENCOUNTER — HOSPITAL ENCOUNTER (OUTPATIENT)
Age: 49
Setting detail: SPECIMEN
Discharge: HOME | End: 2019-02-25
Payer: COMMERCIAL

## 2019-02-25 ENCOUNTER — TELEPHONE (OUTPATIENT)
Dept: CARDIOLOGY CLINIC | Age: 49
End: 2019-02-25

## 2019-02-25 DIAGNOSIS — K59.03 CONSTIPATION DUE TO PAIN MEDICATION: ICD-10-CM

## 2019-02-25 DIAGNOSIS — I50.32 CHRONIC DIASTOLIC HEART FAILURE (HCC): Primary | ICD-10-CM

## 2019-02-25 LAB
ANION GAP SERPL CALCULATED.3IONS-SCNC: 14 MMOL/L (ref 3–16)
BUN BLDV-MCNC: 47 MG/DL (ref 7–20)
CALCIUM SERPL-MCNC: 10.1 MG/DL (ref 8.3–10.6)
CHLORIDE BLD-SCNC: 96 MMOL/L (ref 99–110)
CO2: 29 MMOL/L (ref 21–32)
CREAT SERPL-MCNC: 1.1 MG/DL (ref 0.6–1.1)
GFR AFRICAN AMERICAN: >60
GFR NON-AFRICAN AMERICAN: 53
GLUCOSE BLD-MCNC: 112 MG/DL (ref 70–99)
POTASSIUM SERPL-SCNC: 4.6 MMOL/L (ref 3.5–5.1)
PRO-BNP: 121 PG/ML (ref 0–124)
SODIUM BLD-SCNC: 139 MMOL/L (ref 136–145)

## 2019-02-25 PROCEDURE — 83880 ASSAY OF NATRIURETIC PEPTIDE: CPT

## 2019-02-25 PROCEDURE — 36415 COLL VENOUS BLD VENIPUNCTURE: CPT

## 2019-02-25 PROCEDURE — 80048 BASIC METABOLIC PNL TOTAL CA: CPT

## 2019-02-25 RX ORDER — AMOXICILLIN 250 MG
CAPSULE ORAL
Qty: 120 TABLET | Refills: 3 | Status: SHIPPED | OUTPATIENT
Start: 2019-02-25 | End: 2019-04-26

## 2019-02-26 RX ORDER — PEN NEEDLE, DIABETIC 31 GX5/16"
NEEDLE, DISPOSABLE MISCELLANEOUS
Qty: 500 EACH | Refills: 5 | Status: SHIPPED | OUTPATIENT
Start: 2019-02-26 | End: 2019-12-23 | Stop reason: SDUPTHER

## 2019-02-26 RX ORDER — GLIPIZIDE 10 MG/1
TABLET, FILM COATED, EXTENDED RELEASE ORAL
Qty: 90 TABLET | Refills: 3 | Status: SHIPPED | OUTPATIENT
Start: 2019-02-26 | End: 2019-04-03

## 2019-02-27 ENCOUNTER — TELEPHONE (OUTPATIENT)
Dept: ENDOCRINOLOGY | Age: 49
End: 2019-02-27

## 2019-02-28 RX ORDER — LEVOTHYROXINE SODIUM 0.15 MG/1
150 TABLET ORAL
Qty: 30 TABLET | Refills: 3 | Status: SHIPPED | OUTPATIENT
Start: 2019-02-28 | End: 2019-06-26 | Stop reason: SDUPTHER

## 2019-03-01 ENCOUNTER — TELEPHONE (OUTPATIENT)
Dept: CARDIOLOGY | Age: 49
End: 2019-03-01

## 2019-03-01 ENCOUNTER — TELEPHONE (OUTPATIENT)
Dept: ENDOCRINOLOGY | Age: 49
End: 2019-03-01

## 2019-03-01 LAB — DIABETIC RETINOPATHY: NEGATIVE

## 2019-03-08 ENCOUNTER — TELEPHONE (OUTPATIENT)
Dept: CARDIOLOGY CLINIC | Age: 49
End: 2019-03-08

## 2019-03-11 ENCOUNTER — HOSPITAL ENCOUNTER (OUTPATIENT)
Age: 49
Setting detail: SPECIMEN
Discharge: HOME | End: 2019-03-11
Payer: COMMERCIAL

## 2019-03-11 ENCOUNTER — TELEPHONE (OUTPATIENT)
Dept: CARDIOLOGY CLINIC | Age: 49
End: 2019-03-11

## 2019-03-11 ENCOUNTER — HOSPITAL ENCOUNTER (OUTPATIENT)
Age: 49
Setting detail: SPECIMEN
End: 2019-03-11
Payer: COMMERCIAL

## 2019-03-11 LAB
ANION GAP SERPL CALCULATED.3IONS-SCNC: 11 MMOL/L (ref 3–16)
BUN BLDV-MCNC: 30 MG/DL (ref 7–20)
CALCIUM SERPL-MCNC: 9 MG/DL (ref 8.3–10.6)
CHLORIDE BLD-SCNC: 100 MMOL/L (ref 99–110)
CO2: 29 MMOL/L (ref 21–32)
CREAT SERPL-MCNC: 1.1 MG/DL (ref 0.6–1.1)
GFR AFRICAN AMERICAN: >60
GFR NON-AFRICAN AMERICAN: 53
GLUCOSE BLD-MCNC: 144 MG/DL (ref 70–99)
POTASSIUM SERPL-SCNC: 4.2 MMOL/L (ref 3.5–5.1)
PRO-BNP: 121 PG/ML (ref 0–124)
SODIUM BLD-SCNC: 140 MMOL/L (ref 136–145)

## 2019-03-11 PROCEDURE — 83880 ASSAY OF NATRIURETIC PEPTIDE: CPT

## 2019-03-11 PROCEDURE — 36415 COLL VENOUS BLD VENIPUNCTURE: CPT

## 2019-03-11 PROCEDURE — 80048 BASIC METABOLIC PNL TOTAL CA: CPT

## 2019-03-12 ENCOUNTER — TELEPHONE (OUTPATIENT)
Dept: CARDIOLOGY CLINIC | Age: 49
End: 2019-03-12

## 2019-03-12 ENCOUNTER — TELEPHONE (OUTPATIENT)
Dept: INTERNAL MEDICINE CLINIC | Age: 49
End: 2019-03-12

## 2019-03-13 ENCOUNTER — HOSPITAL ENCOUNTER (OUTPATIENT)
Age: 49
Setting detail: SPECIMEN
Discharge: HOME | End: 2019-03-13
Payer: COMMERCIAL

## 2019-03-13 LAB
BILIRUBIN URINE: NEGATIVE
BLOOD, URINE: NEGATIVE
CLARITY: CLEAR
COLOR: YELLOW
EPITHELIAL CELLS, UA: 1 /HPF (ref 0–5)
GLUCOSE URINE: NEGATIVE MG/DL
HYALINE CASTS: 3 /LPF (ref 0–8)
KETONES, URINE: NEGATIVE MG/DL
LEUKOCYTE ESTERASE, URINE: ABNORMAL
MICROSCOPIC EXAMINATION: YES
NITRITE, URINE: NEGATIVE
PH UA: 7 (ref 5–8)
PROTEIN UA: NEGATIVE MG/DL
RBC UA: 2 /HPF (ref 0–4)
SPECIFIC GRAVITY UA: 1.01 (ref 1–1.03)
URINE TYPE: ABNORMAL
UROBILINOGEN, URINE: 0.2 E.U./DL
WBC UA: 15 /HPF (ref 0–5)

## 2019-03-13 PROCEDURE — 87086 URINE CULTURE/COLONY COUNT: CPT

## 2019-03-13 PROCEDURE — 87077 CULTURE AEROBIC IDENTIFY: CPT

## 2019-03-13 PROCEDURE — 87186 SC STD MICRODIL/AGAR DIL: CPT

## 2019-03-13 PROCEDURE — 81001 URINALYSIS AUTO W/SCOPE: CPT

## 2019-03-15 ENCOUNTER — TELEPHONE (OUTPATIENT)
Dept: INTERNAL MEDICINE CLINIC | Age: 49
End: 2019-03-15

## 2019-03-15 DIAGNOSIS — N30.00 ACUTE CYSTITIS WITHOUT HEMATURIA: Primary | ICD-10-CM

## 2019-03-15 DIAGNOSIS — N39.0 URINARY TRACT INFECTION DUE TO ESBL KLEBSIELLA: ICD-10-CM

## 2019-03-15 DIAGNOSIS — B96.89 URINARY TRACT INFECTION DUE TO ESBL KLEBSIELLA: ICD-10-CM

## 2019-03-15 LAB
ORGANISM: ABNORMAL
URINE CULTURE, ROUTINE: ABNORMAL
URINE CULTURE, ROUTINE: ABNORMAL

## 2019-03-15 RX ORDER — LEVOFLOXACIN 250 MG/1
250 TABLET ORAL DAILY
Qty: 7 TABLET | Refills: 0 | Status: SHIPPED | OUTPATIENT
Start: 2019-03-15 | End: 2019-03-22

## 2019-03-18 ENCOUNTER — HOSPITAL ENCOUNTER (OUTPATIENT)
Age: 49
Setting detail: SPECIMEN
Discharge: HOME | End: 2019-03-18
Payer: COMMERCIAL

## 2019-03-18 LAB
ANION GAP SERPL CALCULATED.3IONS-SCNC: 16 MMOL/L (ref 3–16)
BUN BLDV-MCNC: 37 MG/DL (ref 7–20)
CALCIUM SERPL-MCNC: 9.8 MG/DL (ref 8.3–10.6)
CHLORIDE BLD-SCNC: 90 MMOL/L (ref 99–110)
CO2: 29 MMOL/L (ref 21–32)
CREAT SERPL-MCNC: 1 MG/DL (ref 0.6–1.1)
GFR AFRICAN AMERICAN: >60
GFR NON-AFRICAN AMERICAN: 59
GLUCOSE BLD-MCNC: 190 MG/DL (ref 70–99)
POTASSIUM SERPL-SCNC: 4.3 MMOL/L (ref 3.5–5.1)
PRO-BNP: 68 PG/ML (ref 0–124)
SODIUM BLD-SCNC: 135 MMOL/L (ref 136–145)
URIC ACID, SERUM: 8.8 MG/DL (ref 2.6–6)

## 2019-03-18 PROCEDURE — 84550 ASSAY OF BLOOD/URIC ACID: CPT

## 2019-03-18 PROCEDURE — 83880 ASSAY OF NATRIURETIC PEPTIDE: CPT

## 2019-03-18 PROCEDURE — 80048 BASIC METABOLIC PNL TOTAL CA: CPT

## 2019-03-18 PROCEDURE — 36415 COLL VENOUS BLD VENIPUNCTURE: CPT

## 2019-03-20 ENCOUNTER — TELEPHONE (OUTPATIENT)
Dept: CARDIOLOGY CLINIC | Age: 49
End: 2019-03-20

## 2019-03-26 RX ORDER — PEN NEEDLE, DIABETIC 31 GX5/16"
NEEDLE, DISPOSABLE MISCELLANEOUS
Qty: 200 EACH | Refills: 3 | Status: SHIPPED | OUTPATIENT
Start: 2019-03-26 | End: 2019-09-22 | Stop reason: SDUPTHER

## 2019-03-26 RX ORDER — MONTELUKAST SODIUM 10 MG/1
TABLET ORAL
Qty: 30 TABLET | Refills: 4 | Status: SHIPPED | OUTPATIENT
Start: 2019-03-26 | End: 2019-08-20 | Stop reason: SDUPTHER

## 2019-03-27 ENCOUNTER — TELEPHONE (OUTPATIENT)
Dept: CARDIOLOGY CLINIC | Age: 49
End: 2019-03-27

## 2019-03-28 ENCOUNTER — TELEPHONE (OUTPATIENT)
Dept: INTERNAL MEDICINE CLINIC | Age: 49
End: 2019-03-28

## 2019-03-29 ENCOUNTER — TELEPHONE (OUTPATIENT)
Dept: RHEUMATOLOGY | Age: 49
End: 2019-03-29

## 2019-03-29 RX ORDER — LUBIPROSTONE 8 UG/1
8 CAPSULE, GELATIN COATED ORAL 2 TIMES DAILY WITH MEALS
Qty: 60 CAPSULE | Refills: 1 | Status: SHIPPED | OUTPATIENT
Start: 2019-03-29 | End: 2019-04-10 | Stop reason: CLARIF

## 2019-03-29 NOTE — TELEPHONE ENCOUNTER
Art PA form completed for [facility] and faxed in.      Pending review

## 2019-04-01 ENCOUNTER — HOSPITAL ENCOUNTER (OUTPATIENT)
Age: 49
Setting detail: SPECIMEN
Discharge: HOME | End: 2019-04-01
Payer: COMMERCIAL

## 2019-04-01 LAB
ANION GAP SERPL CALCULATED.3IONS-SCNC: 13 MMOL/L (ref 3–16)
BUN BLDV-MCNC: 37 MG/DL (ref 7–20)
CALCIUM SERPL-MCNC: 10 MG/DL (ref 8.3–10.6)
CHLORIDE BLD-SCNC: 91 MMOL/L (ref 99–110)
CO2: 29 MMOL/L (ref 21–32)
CREAT SERPL-MCNC: 1.2 MG/DL (ref 0.6–1.1)
GFR AFRICAN AMERICAN: 58
GFR NON-AFRICAN AMERICAN: 48
GLUCOSE BLD-MCNC: 374 MG/DL (ref 70–99)
POTASSIUM SERPL-SCNC: 4.8 MMOL/L (ref 3.5–5.1)
PRO-BNP: 108 PG/ML (ref 0–124)
SODIUM BLD-SCNC: 133 MMOL/L (ref 136–145)
URIC ACID, SERUM: 10.7 MG/DL (ref 2.6–6)

## 2019-04-01 PROCEDURE — 84550 ASSAY OF BLOOD/URIC ACID: CPT

## 2019-04-01 PROCEDURE — 80048 BASIC METABOLIC PNL TOTAL CA: CPT

## 2019-04-01 PROCEDURE — 83880 ASSAY OF NATRIURETIC PEPTIDE: CPT

## 2019-04-01 PROCEDURE — 36415 COLL VENOUS BLD VENIPUNCTURE: CPT

## 2019-04-02 ENCOUNTER — TELEPHONE (OUTPATIENT)
Dept: CARDIOLOGY CLINIC | Age: 49
End: 2019-04-02

## 2019-04-02 NOTE — TELEPHONE ENCOUNTER
----- Message from Jodie Levi sent at 4/2/2019  3:01 PM EDT -----      ----- Message -----  From: Elliot Chew MD  Sent: 4/2/2019   1:32 PM  To: [facility] pt. Labs look okay. Sugar is very high, 374. Needs to check in with Dr. Ton Lopez about this. Other labs look good. No changes.   ARETHA

## 2019-04-02 NOTE — TELEPHONE ENCOUNTER
Unable to leave message on machine because out-going-message did not identify pt or spouse.     Pt has OV with ARETHA tomorrow (4/3/19)

## 2019-04-03 ENCOUNTER — OFFICE VISIT (OUTPATIENT)
Dept: CARDIOLOGY CLINIC | Age: 49
End: 2019-04-03
Payer: COMMERCIAL

## 2019-04-03 ENCOUNTER — TELEPHONE (OUTPATIENT)
Dept: CARDIOLOGY CLINIC | Age: 49
End: 2019-04-03

## 2019-04-03 VITALS
OXYGEN SATURATION: 92 % | BODY MASS INDEX: 31.58 KG/M2 | SYSTOLIC BLOOD PRESSURE: 100 MMHG | HEIGHT: 64 IN | HEART RATE: 72 BPM | WEIGHT: 185 LBS | DIASTOLIC BLOOD PRESSURE: 70 MMHG

## 2019-04-03 DIAGNOSIS — I89.0 LYMPHEDEMA OF UPPER EXTREMITY FOLLOWING LYMPHADENECTOMY: ICD-10-CM

## 2019-04-03 DIAGNOSIS — I10 ESSENTIAL HYPERTENSION: Chronic | ICD-10-CM

## 2019-04-03 DIAGNOSIS — E78.5 HYPERLIPIDEMIA LDL GOAL <70: ICD-10-CM

## 2019-04-03 DIAGNOSIS — I25.10 CORONARY ARTERY DISEASE INVOLVING NATIVE CORONARY ARTERY OF NATIVE HEART WITHOUT ANGINA PECTORIS: Chronic | ICD-10-CM

## 2019-04-03 DIAGNOSIS — E89.89 LYMPHEDEMA OF UPPER EXTREMITY FOLLOWING LYMPHADENECTOMY: ICD-10-CM

## 2019-04-03 DIAGNOSIS — G47.33 OSA (OBSTRUCTIVE SLEEP APNEA): ICD-10-CM

## 2019-04-03 DIAGNOSIS — M06.9 RHEUMATOID ARTHRITIS INVOLVING MULTIPLE SITES, UNSPECIFIED RHEUMATOID FACTOR PRESENCE: ICD-10-CM

## 2019-04-03 DIAGNOSIS — R06.02 SOB (SHORTNESS OF BREATH): Chronic | ICD-10-CM

## 2019-04-03 DIAGNOSIS — I50.30 (HFPEF) HEART FAILURE WITH PRESERVED EJECTION FRACTION (HCC): Primary | Chronic | ICD-10-CM

## 2019-04-03 PROCEDURE — 3017F COLORECTAL CA SCREEN DOC REV: CPT | Performed by: INTERNAL MEDICINE

## 2019-04-03 PROCEDURE — G8598 ASA/ANTIPLAT THER USED: HCPCS | Performed by: INTERNAL MEDICINE

## 2019-04-03 PROCEDURE — 1036F TOBACCO NON-USER: CPT | Performed by: INTERNAL MEDICINE

## 2019-04-03 PROCEDURE — 99214 OFFICE O/P EST MOD 30 MIN: CPT | Performed by: INTERNAL MEDICINE

## 2019-04-03 PROCEDURE — G8427 DOCREV CUR MEDS BY ELIG CLIN: HCPCS | Performed by: INTERNAL MEDICINE

## 2019-04-03 PROCEDURE — G8417 CALC BMI ABV UP PARAM F/U: HCPCS | Performed by: INTERNAL MEDICINE

## 2019-04-03 RX ORDER — POLYETHYLENE GLYCOL 3350 17 G/17G
17 POWDER, FOR SOLUTION ORAL DAILY
COMMUNITY
End: 2019-05-15 | Stop reason: SDUPTHER

## 2019-04-03 NOTE — PROGRESS NOTES
Spoke with Pt's family member. Pt scheduled at 11:00 4/4/19. Would not schedule any earlier. Office to send order.

## 2019-04-03 NOTE — TELEPHONE ENCOUNTER
Wanted to let ARETHA know that she has scheduled the appt for tomorrow (4-4-19) at 11am.  The Pt refused to come any earlier. She is still waiting for Lasix script. Pls call to advise. Thank you.

## 2019-04-03 NOTE — PROGRESS NOTES
[address]  Advanced CHF/Pulmonary Hypertension   Cardiac Follow up       Kathryn Wells  YOB: 1970    Date of Visit:  4/3/19      Chief Complaint   Patient presents with    Congestive Heart Failure    Fatigue       History of Present Illness:  Ms. Kerry Rose is a  47 y.o. female is seen for chronic dCHF, CAD with cardiac cath 2014 at [facility] showing diffuse LAD disease and small vessel disease with normal RCA and LCX (treated medically).  Breast cancer 8+ years ago s/p mastectomy.  LUIS with cpap non compliant.     Her cardiac cath in 2014 showed diffuse LAD disease . She has had issues with fluid overload but multiple echos have shown normal EF and diastolic function. Cardiac MRI performed showed normal LVEF 61% and no evidence of constriction. She was hospitalized 1/12-1/19/18 CXR showed mild pulmonary edema, proBNP 277. VQ neg for PE and flu negative. She was treated for exacerbation of asthma and HFpEF. ~ten years ago she had radiation for breast cancer. She follows with endocrinology Dr. Yasmine Madrigal. She had a LHC/RHC  2/28/18 to r/o pericardial constriction as cause for her frequent episodes of fluid overload due to her history of radiation therapy to left breast. Also was looking for restriction that could cause fluid build up as well. No evidence found. Coronaries showed small vessel CAD due to diabetes. No constriction or restriction. PCWP is about 21 and normal.     Today,   Patient is here with her . He states her uric acid is high at 12. Her weight at home is 177 and stable within 1-2 pounds. The last time she took Metolazone was Saturday 2-16-18 for a weight of 181 lbs. She is taking colchicine 0.6mg daily and allopurinol for gout. Medication added to med list.  Her  states her uric acid level is high and requests that [facility] draw that with the other labs. Denies shortness of breath, orthopnea, PND, edema or palpitations.  Her  does much of the talking about her care and medications and he is very knowledgeable. She is now off Tamoxifen, Celebrex and Plaquenil. Today,  she states she is tired. Her weight is 185 at home. Last office visit it was 177-179. The patient is here with her megan. He states she is constipated and Chip Sims does not work. \"  I have informed her the process for PA for Amitiza was started to get approved. I have asked her to increase her Miralax or also to start [id number]. She is taking Metolazone twice a week. I would like her to continue this but not back to back days. Labs reviewed. Uric acid is back up to 10.7. Today her feet are left shin are painful. Her left hand is very swollen today and she c/o neck swelling today. Her last BM was 3-30-19. I would like a weight after a good BM and recommend Milk of Magnesia. She is asking about IV lasix. I am not sure if this is fluid or stool weight. She is on chronic narcotics daily. She is having SOB and an increase in her oxygen to 3 liters from 2 liters at home due to drop in sats of 85%. [address] several times and no answer. I have instructed the patient and her  that we will call them tomorrow after reaching the [facility] with a time to come in for Lasix 120mg IVP.       Allergies   Allergen Reactions    Iv Dye [Iodides] Anaphylaxis     allergic to ct scan dye, not the MRI    Basaglar Kwikpen [Insulin Glargine]      High blood sugar     Trazodone And Nefazodone Other (See Comments)     Makes patient feel very sluggish     Current Outpatient Medications   Medication Sig Dispense Refill    polyethylene glycol (MIRALAX) PACK packet Take 17 g by mouth daily      lubiprostone (AMITIZA) 8 MCG CAPS capsule Take 1 capsule by mouth 2 times daily (with meals) 60 capsule 1    montelukast (SINGULAIR) 10 MG tablet TAKE 1 TABLET BY MOUTH ONE TIME A DAY  30 tablet 4    levothyroxine (SYNTHROID) 150 MCG tablet Take 1 tablet by mouth every morning (before breakfast) 30 tablet 3    senna-docusate (SENEXON-S) 8.6-50 MG per tablet TAKE 2 TABLETS BY MOUTH TWO TIMES A  tablet 3    metoprolol tartrate (LOPRESSOR) 25 MG tablet Take 1 tablet by mouth 2 times daily 90 tablet 3    allopurinol (ZYLOPRIM) 100 MG tablet Take 1 tablet by mouth daily 30 tablet 5    insulin aspart (NOVOLOG FLEXPEN) 100 UNIT/ML injection pen INJECT 30 UNITS SUBCUTANEOUSLY THREE TIMES DAILY BEFORE MEALS 15 mL 2    glipiZIDE (GLUCOTROL XL) 10 MG extended release tablet TAKE 1 TABLET BY MOUTH ONE TIME A DAY 30 tablet 0    Insulin Degludec 200 UNIT/ML SOPN Take 150 units daily 5 pen 3    empagliflozin (JARDIANCE) 10 MG tablet Take 1 tablet by mouth daily 30 tablet 3    metolazone (ZAROXOLYN) 2.5 MG tablet TAKE 1 TABLET BY MOUTH ONE TIME A DAY AS NEEDED (Patient taking differently: TAKE 1 TABLET BY Twice Weekly:  Sunday and Monday) 30 tablet 1    ferrous sulfate 325 (65 Fe) MG tablet Take 1 tablet by mouth 2 times daily (with meals) 180 tablet 3    polyethylene glycol (GLYCOLAX) powder Take 17 g by mouth nightly 17 g 3    omeprazole (PRILOSEC) 20 MG delayed release capsule Take 2 capsules by mouth 2 times daily For 2 months then resume 20 mg po bid (Patient taking differently: Take 20 mg by mouth 2 times daily ) 60 capsule 3    loratadine (CLARITIN) 10 MG tablet Take 1 tablet by mouth daily 90 tablet 1    colchicine (COLCRYS) 0.6 MG tablet Take 0.6 mg by mouth 2 times daily       torsemide (DEMADEX) 100 MG tablet TAKE 1 TABLET BY MOUTH IN THE MORNING AND 1/2 TABLET BY MOUTH IN THE EVENING 45 tablet 5    simvastatin (ZOCOR) 20 MG tablet Take 1 tablet by mouth nightly 90 tablet 3    spironolactone (ALDACTONE) 50 MG tablet 100mg morning, 50mg evening 270 tablet 3    albuterol sulfate  (90 Base) MCG/ACT inhaler Inhale 2 puffs into the lungs every 6 hours as needed for Wheezing (Patient taking differently: Inhale 2 puffs into the lungs 2 times daily ) 1 Inhaler 6    OXYGEN Inhale 3 L into the lungs nightly Sometimes with activity      oxyCODONE (OXYCONTIN) 20 MG extended release tablet Take 20 mg by mouth every 12 hours.  aspirin 81 MG EC tablet Take 81 mg by mouth daily      sulfaSALAzine (AZULFIDINE) 500 MG tablet Take 500 mg by mouth 2 times daily      calcium carbonate-vitamin D (CALCIUM 600+D) 600-200 MG-UNIT TABS Take 1 tablet by mouth 2 times daily      benztropine (COGENTIN) 1 MG tablet Take 1 mg by mouth nightly       folic acid (FOLVITE) 1 MG tablet Take 1 mg by mouth daily      lurasidone (LATUDA) 40 MG TABS tablet Take 40 mg by mouth Daily with supper       leflunomide (ARAVA) 20 MG tablet Take 20 mg by mouth daily      albuterol (ACCUNEB) 1.25 MG/3ML nebulizer solution Inhale 1 ampule into the lungs every 6 hours as needed for Wheezing      mometasone-formoterol (DULERA) 200-5 MCG/ACT inhaler Inhale 2 puffs into the lungs every 12 hours      fluticasone (FLONASE) 50 MCG/ACT nasal spray 1 spray by Nasal route daily (Patient taking differently: 1 spray by Nasal route daily ) 1 Bottle 0    oxyCODONE-acetaminophen (PERCOCET)  MG per tablet Take 1 tablet by mouth every 4 hours as needed for Pain . Earliest Fill Date: 6/8/17 100 tablet 0    ALPRAZolam (XANAX) 1 MG tablet Take 1 mg by mouth 2 times daily .  LYRICA 50 MG capsule TAKE 1 CAPSULE BY MOUTH TWICE DAILY (Patient taking differently: 1 tab BID) 60 capsule 0    duloxetine (CYMBALTA) 60 MG capsule Take 60 mg by mouth 2 times daily.         B-D UF III MINI PEN NEEDLES 31G X 5 MM MISC use five times daily with insulin 200 each 3    B-D UF III MINI PEN NEEDLES 31G X 5 MM MISC use five times daily with insulin 500 each 5    Continuous Blood Gluc  (FREESTYLE EMERALD READER) MINDY To monitor glucose 1 Device 2    Continuous Blood Gluc Sensor (FREESTYLE EMERALD 14 DAY SENSOR) MISC Change every 14 days 2 each 5    Magic Mouthwash (MIRACLE MOUTHWASH) Swish and spit 5 mLs 4 times daily as needed for Irritation (mouth and throat pain) 300 mL 0    Cholecalciferol (VITAMIN D) 2000 units CAPS capsule Take 1 capsule by mouth daily (with breakfast)      Elastic Bandages & Supports (FUTURO SUPPORT GLOVE MEDIUM) MISC 1 ready made support glove 1 each 2    Compression Sleeve MISC by Does not apply route 1 sleeve, 20-30 mmHg 1 each 2     No current facility-administered medications for this visit.       Past Medical History:   Diagnosis Date    Anxiety     Anxiety and depression     Arthritis     not sure of specific type    Asthma     CAD (coronary artery disease)     Cancer ([facility] 2007    left breast    Cerebral artery occlusion with cerebral infarction ([facility]     CHF (congestive heart failure) ([facility] 2018    Chronic kidney disease     renal insufficency/to see Dr Rosamaria Cobb    Chronic pain     Constipation     COPD (chronic obstructive pulmonary disease) ([facility]     Depression     [facility]     Diabetic polyneuropathy associated with type 2 diabetes mellitus ([facility] 2/2/2018    Dysthymia 2/2/2018    ESBL (extended spectrum beta-lactamase) producing bacteria infection 03/14/2018    urine    Fibromyalgia     Gastric ulcer, unspecified as acute or chronic, without mention of hemorrhage, perforation, or obstruction     GERD (gastroesophageal reflux disease)     HIGH CHOLESTEROL     Hypertension     Hypothyroidism     Severe persistent asthma without complication 5/4/2010    Thyroid disease     TIA (transient ischemic attack)     with occasional left leg and hand weakness     Past Surgical History:   Procedure Laterality Date    BREAST SURGERY      left mastectomy    CARPAL TUNNEL RELEASE      Bilateral    FINGER CONTRACTURE SURGERY      HYSTERECTOMY      TONSILLECTOMY       Family History   Problem Relation Age of Onset    Asthma Other     Cancer Other     Depression Other     Diabetes Other     Hypertension Other     High Cholesterol Other  Migraines Other     Heart Attack Father 72         of MI    High Blood Pressure Mother     Diabetes Mother      Social History     Socioeconomic History    Marital status:      Spouse name: Kelsea Sellers Number of children: 3    Years of education: Not on file    Highest education level: Not on file   Occupational History    Occupation: disabled   Social Needs    Financial resource strain: Not on file    Food insecurity:     Worry: Not on file     Inability: Not on file   [employer] needs:     Medical: Not on file     Non-medical: Not on file   Tobacco Use    Smoking status: Never Smoker    Smokeless tobacco: Never Used   Substance and Sexual Activity    Alcohol use: No    Drug use: No    Sexual activity: Not on file   Lifestyle    Physical activity:     Days per week: Not on file     Minutes per session: Not on file    Stress: Not on file   Relationships    Social connections:     Talks on phone: Not on file     Gets together: Not on file     Attends [employer] service: Not on file     Active member of club or organization: Not on file     Attends meetings of clubs or organizations: Not on file     Relationship status: Not on file    Intimate partner violence:     Fear of current or ex partner: Not on file     Emotionally abused: Not on file     Physically abused: Not on file     Forced sexual activity: Not on file   Other Topics Concern    Not on file   Social History Narrative    Not on file     Review of Systems:   · Constitutional: there has been no unanticipated weight loss. There's been no change in energy level, sleep pattern, or activity level. · Eyes: No visual changes or diplopia. No scleral icterus. · ENT: No Headaches, hearing loss or vertigo. No mouth sores or sore throat. · Cardiovascular: Reviewed in HPI  · Respiratory: No cough or wheezing, no sputum production. No hematemesis. · Gastrointestinal: No abdominal pain, appetite loss, blood in stools.  No change in bowel or bladder habits. · Genitourinary: No dysuria, trouble voiding, or hematuria. · Musculoskeletal:  No gait disturbance, weakness or joint complaints. · Integumentary: No rash or pruritis. · Neurological: No headache, diplopia, change in muscle strength, numbness or tingling. No change in gait, balance, coordination, mood, affect, memory, mentation, behavior. · Psychiatric: No anxiety, no depression. · Endocrine: No malaise, fatigue or temperature intolerance. No excessive thirst, fluid intake, or urination. No tremor. · Hematologic/Lymphatic: No abnormal bruising or bleeding, blood clots or swollen lymph nodes. · Allergic/Immunologic: No nasal congestion or hives. Physical Examination:    /70   Pulse 72   Ht 5' 4\" (1.626 m)   Wt 185 lb (83.9 kg)   SpO2 92%   BMI 31.76 kg/m²       Wt Readings from Last 3 Encounters:   04/03/19 185 lb (83.9 kg)   02/21/19 177 lb (80.3 kg)   02/12/19 179 lb (81.2 kg)     BP Readings from Last 3 Encounters:   04/03/19 100/70   02/21/19 126/80   02/12/19 100/72     Constitutional and General Appearance:   WD/WN who is looking more puffy than before  HEENT:  NC/AT  XANDER  No problems with hearing  Skin:  Warm, dry  Respiratory:  · Normal excursion and expansion without use of accessory muscles  · Resp Auscultation: Normal breath sounds without dullness  Cardiovascular:  · The apical impulses not displaced  · Heart tones are crisp and normal  · Cervical veins are not engorged  · The carotid upstroke is normal in amplitude and contour without delay or bruit  · JVP 8-9 cm H2O  RRR with nl S1 and S2 without m,r,g  · Peripheral pulses are symmetrical and full  · There is no clubbing, cyanosis of the extremities. Left hand and left arm edema is increased. Fingers very swollen.     · bilateral edema  · Femoral Arteries: 2+ and equal  · Pedal Pulses: 2+ and equal   Neck:  · No thyromegaly  Abdomen:  abdominal girth soft  · No masses or tenderness  · Liver/Spleen: No Abnormalities Noted  Neurological/Psychiatric:  · Alert and oriented in all spheres  · Moves all extremities well  · Exhibits normal gait balance and coordination  · No abnormalities of mood, affect, memory, mentation, or behavior are noted    Diagnostics:    Left Heart Cath 2/27/18:  Dominance : Right     LM: bifurcating, MLI  LAD: mild plaquing with small vessel disease distally   LCx: no significant disease  RCA: MLI     LVEDP: 25 mmHg   No Ao gradient     Right Heart Cath   RA: 13  RV: 47/6/16  PA:   46/19    and mean of 32  PCWP: 21 mmHg      Sats:  Ao: 92%  RA: 61%  PA: 62% (x 2)     CO/CI : 6.1 L    CXR 1/15/18:  No acute cardiopulmonary disease     Echo 11/8/2017:  Normal left ventricle size and systolic function with an estimated ejection   fraction of 60%. No regional wall motion abnormalities are seen.  Dennie Arms is borderline concentric left ventricular hypertrophy.   E/e\"= 13     Stress test 11/8/2017: There is normal isotope uptake at stress and rest. There is no evidence of  myocardial ischemia or scar.  Normal LV function.  Overall findings represent a low risk scan.       VQ scan negative 1/11/2018    CXR 1/16/18  No acute cardiopulmonary disease     Echo 11/8/2017:  Normal left ventricle size and systolic function with an estimated ejection fraction of 60%. No regional wall motion abnormalities are seen. There is borderline concentric left ventricular hypertrophy.   E/e\"= 13     Stress test 11/8/2017: There is normal isotope uptake at stress and rest. There is no evidence of  myocardial ischemia or scar.  Normal LV function.  Overall findings represent a low risk scan.       VQ scan 1/11/2018:  Normal study.  No evidence of pulmonary embolus.     Assessment:  1. (HFpEF) heart failure with preserved ejection fraction ([address]    2. LUIS (obstructive sleep apnea)    3. Lymphedema of upper extremity following lymphadenectomy    4. Hyperlipidemia LDL goal <70    5.  Rheumatoid

## 2019-04-03 NOTE — PATIENT INSTRUCTIONS
1.  Get some Milk of Magnesia which is over the counter. Increase her Miralax. 2.  Infusion of IV lasix 120mg tomorrow. 3.  Follow up in 2 months.

## 2019-04-04 ENCOUNTER — TELEPHONE (OUTPATIENT)
Dept: RHEUMATOLOGY | Age: 49
End: 2019-04-04

## 2019-04-04 ENCOUNTER — HOSPITAL ENCOUNTER (OUTPATIENT)
Dept: ONCOLOGY | Age: 49
Setting detail: INFUSION SERIES
Discharge: HOME | End: 2019-04-04
Payer: COMMERCIAL

## 2019-04-04 ENCOUNTER — TELEPHONE (OUTPATIENT)
Dept: CARDIOLOGY CLINIC | Age: 49
End: 2019-04-04

## 2019-04-04 VITALS
HEART RATE: 71 BPM | SYSTOLIC BLOOD PRESSURE: 130 MMHG | TEMPERATURE: 97.6 F | DIASTOLIC BLOOD PRESSURE: 66 MMHG | RESPIRATION RATE: 16 BRPM

## 2019-04-04 PROCEDURE — 6360000002 HC RX W HCPCS: Performed by: INTERNAL MEDICINE

## 2019-04-04 PROCEDURE — 96374 THER/PROPH/DIAG INJ IV PUSH: CPT

## 2019-04-04 RX ORDER — FUROSEMIDE 10 MG/ML
120 INJECTION INTRAMUSCULAR; INTRAVENOUS ONCE
Status: COMPLETED | OUTPATIENT
Start: 2019-04-04 | End: 2019-04-04

## 2019-04-04 RX ORDER — FUROSEMIDE 10 MG/ML
120 INJECTION INTRAMUSCULAR; INTRAVENOUS ONCE
Qty: 12 ML | Refills: 0
Start: 2019-04-04 | End: 2019-05-15 | Stop reason: ALTCHOICE

## 2019-04-04 RX ADMIN — FUROSEMIDE 120 MG: 10 INJECTION, SOLUTION INTRAMUSCULAR; INTRAVENOUS at 11:50

## 2019-04-04 NOTE — PROGRESS NOTES
Patient to Department for LASIX IVP only. Pt saw Dr. Estrellita Jacinto yesterday. Lasix 120 mg IVP given at this time Pt eliana well. No adverse reactions noted. Lasix reviewed and denied need for handout. Patient to follow up with Dr. Estrellita Jacinto as needed.

## 2019-04-04 NOTE — TELEPHONE ENCOUNTER
I called the [facility] and confirmed if the order was received for IV lasix. Spoke to staff and pt has a 11am appointment today.       Called patient at requested phone number and left a message that her infusion time for IV Lasix is 11am.

## 2019-04-04 NOTE — TELEPHONE ENCOUNTER
PA submitted for [facility] OR CAPS on [facility]  Key: [id number] - PA Case ID: [id number] - Rx #: [id number]    Pending review

## 2019-04-05 NOTE — TELEPHONE ENCOUNTER
Received DENIAL for [facility] OR CAPS. Denial letter attached. Please advise patient. Thank you.

## 2019-04-09 ENCOUNTER — TELEPHONE (OUTPATIENT)
Dept: INTERNAL MEDICINE CLINIC | Age: 49
End: 2019-04-09

## 2019-04-09 NOTE — TELEPHONE ENCOUNTER
Frida STUART [place]) call and states that she is with pt now, pt is not having bowel moment since Friday, she is passing gas, have good bowel sound, eating well, taking Miralax  and stool softener that did not help. She request call back.

## 2019-04-09 NOTE — TELEPHONE ENCOUNTER
[facility] calling. Pt had heart pains and  gave her a nitro tablet that was prescribed by another dr. The nitro worked and pt's chest pains stopped. Can pt be called in a script for nitro?

## 2019-04-09 NOTE — TELEPHONE ENCOUNTER
Called [facility]. Patient's weight is stable. Had 1 episode of chest pain at rest relieved by 1 ntg tab. Gets chest pain once a month. OV 4/3/2019  1. HFpEF:  Decompensated. Increased fatigue, increased weight. Abdominal fullness but she also has constipation. Continue daily weights.       2. LUIS:       3.  Lymphedema of upper extremity: ++ edema to left hand and left arm. Try to keep it elevated. Continue wearing ace wrap.       4. Hyperlipidemia LDL goal <70:  2-2019 . Not at goal.  Continue Zocor.     5. Rheumatoid arthritis involving multiple sites:  Patient having feet, ankle and shin pain today. She is on chronic pain meds.       6. CAD with angina pectoris:  Denies chest pain. Stable. [address] [facility] 2014 with diffuse LAD disease and small vessel disease.      7.  Essential HTN:  /70   Pulse 72   Ht 5' 4\" (1.626 m)   Wt 185 lb (83.9 kg)   SpO2 92%   BMI 31.76 kg/m² . Controlled today. Continue same meds.       8. SOB:  Arrange for IV lasix 120mg in the infusion center on 4-4-19. Get on a bowel regimen daily which will help with SOB.        Plan:  1. Get some Milk of Magnesia which is over the counter. Increase her Miralax. 2.  Infusion of IV lasix 120mg tomorrow. (Need to reach staff at infusion center tomorrow morning)   3. Follow up in 2 months.         1. Labs by [employer] every other week.          If weight is greater than 180 pounds, take metolazone no more than 2 days in a row. If still over 180 pounds call us for IV infusion orders. Keep weight at 175-180 pounds: Torsemide twice daily> 100mg in the am and 50mg in the evening.     Less than 174 pounds, take torsemide 50 mg twice daily.

## 2019-04-10 ENCOUNTER — OFFICE VISIT (OUTPATIENT)
Dept: INTERNAL MEDICINE CLINIC | Age: 49
End: 2019-04-10
Payer: COMMERCIAL

## 2019-04-10 VITALS
OXYGEN SATURATION: 93 % | BODY MASS INDEX: 31.76 KG/M2 | HEART RATE: 80 BPM | WEIGHT: 185 LBS | SYSTOLIC BLOOD PRESSURE: 110 MMHG | DIASTOLIC BLOOD PRESSURE: 62 MMHG

## 2019-04-10 DIAGNOSIS — K11.1 ENLARGEMENT OF SUBMANDIBULAR GLAND: ICD-10-CM

## 2019-04-10 DIAGNOSIS — R13.12 OROPHARYNGEAL DYSPHAGIA: ICD-10-CM

## 2019-04-10 DIAGNOSIS — M79.605 PAIN IN BOTH LOWER EXTREMITIES: ICD-10-CM

## 2019-04-10 DIAGNOSIS — Z60.3 IMMIGRANT WITH LANGUAGE DIFFICULTY: ICD-10-CM

## 2019-04-10 DIAGNOSIS — K59.03 CONSTIPATION DUE TO PAIN MEDICATION: Primary | ICD-10-CM

## 2019-04-10 DIAGNOSIS — Z13.31 POSITIVE DEPRESSION SCREENING: ICD-10-CM

## 2019-04-10 DIAGNOSIS — R68.84 JAW PAIN: ICD-10-CM

## 2019-04-10 DIAGNOSIS — M79.7 FIBROMYALGIA: ICD-10-CM

## 2019-04-10 DIAGNOSIS — M79.604 PAIN IN BOTH LOWER EXTREMITIES: ICD-10-CM

## 2019-04-10 PROCEDURE — 3017F COLORECTAL CA SCREEN DOC REV: CPT | Performed by: INTERNAL MEDICINE

## 2019-04-10 PROCEDURE — G8598 ASA/ANTIPLAT THER USED: HCPCS | Performed by: INTERNAL MEDICINE

## 2019-04-10 PROCEDURE — G8427 DOCREV CUR MEDS BY ELIG CLIN: HCPCS | Performed by: INTERNAL MEDICINE

## 2019-04-10 PROCEDURE — G8431 POS CLIN DEPRES SCRN F/U DOC: HCPCS | Performed by: INTERNAL MEDICINE

## 2019-04-10 PROCEDURE — G8417 CALC BMI ABV UP PARAM F/U: HCPCS | Performed by: INTERNAL MEDICINE

## 2019-04-10 PROCEDURE — 1036F TOBACCO NON-USER: CPT | Performed by: INTERNAL MEDICINE

## 2019-04-10 PROCEDURE — 99215 OFFICE O/P EST HI 40 MIN: CPT | Performed by: INTERNAL MEDICINE

## 2019-04-10 RX ORDER — NITROGLYCERIN 0.4 MG/1
0.4 TABLET SUBLINGUAL EVERY 5 MIN PRN
COMMUNITY
End: 2019-04-10 | Stop reason: SDUPTHER

## 2019-04-10 RX ORDER — NITROGLYCERIN 0.4 MG/1
0.4 TABLET SUBLINGUAL EVERY 5 MIN PRN
Qty: 25 TABLET | Refills: 1 | Status: SHIPPED | OUTPATIENT
Start: 2019-04-10 | End: 2020-02-12 | Stop reason: SDUPTHER

## 2019-04-10 SDOH — SOCIAL STABILITY - SOCIAL INSECURITY: ACCULTURATION DIFFICULTY: Z60.3

## 2019-04-10 ASSESSMENT — ENCOUNTER SYMPTOMS
NAUSEA: 1
ABDOMINAL PAIN: 1
CONSTIPATION: 1
CHEST TIGHTNESS: 0
SHORTNESS OF BREATH: 0
DIARRHEA: 0
TROUBLE SWALLOWING: 1
BACK PAIN: 1

## 2019-04-10 ASSESSMENT — PATIENT HEALTH QUESTIONNAIRE - PHQ9
9. THOUGHTS THAT YOU WOULD BE BETTER OFF DEAD, OR OF HURTING YOURSELF: 1
7. TROUBLE CONCENTRATING ON THINGS, SUCH AS READING THE NEWSPAPER OR WATCHING TELEVISION: 0
3. TROUBLE FALLING OR STAYING ASLEEP: 3
SUM OF ALL RESPONSES TO PHQ QUESTIONS 1-9: 20
2. FEELING DOWN, DEPRESSED OR HOPELESS: 3
10. IF YOU CHECKED OFF ANY PROBLEMS, HOW DIFFICULT HAVE THESE PROBLEMS MADE IT FOR YOU TO DO YOUR WORK, TAKE CARE OF THINGS AT HOME, OR GET ALONG WITH OTHER PEOPLE: 2
6. FEELING BAD ABOUT YOURSELF - OR THAT YOU ARE A FAILURE OR HAVE LET YOURSELF OR YOUR FAMILY DOWN: 3
4. FEELING TIRED OR HAVING LITTLE ENERGY: 3
1. LITTLE INTEREST OR PLEASURE IN DOING THINGS: 1
SUM OF ALL RESPONSES TO PHQ QUESTIONS 1-9: 20
8. MOVING OR SPEAKING SO SLOWLY THAT OTHER PEOPLE COULD HAVE NOTICED. OR THE OPPOSITE, BEING SO FIGETY OR RESTLESS THAT YOU HAVE BEEN MOVING AROUND A LOT MORE THAN USUAL: 3
5. POOR APPETITE OR OVEREATING: 3
SUM OF ALL RESPONSES TO PHQ9 QUESTIONS 1 & 2: 4

## 2019-04-10 NOTE — PROGRESS NOTES
Patient: Guera Colon is a 47 y.o. female who presents today with the following Chief Complaint(s):  Chief Complaint   Patient presents with    Check-Up     F/U dexa scan and lab results    Constipation     6 days      Here today for follow up. Constipation- has not responded to to Miralax BID and Senna. Was previously on Amitiza 8 mcg BID which worked really well for her but she had to stop it Nicola Carr. Amitiza was recently denied by [facility]. No apparent reason in the denial letter. Has not tried Linzess. Is also complaining of difficulty swallowing. Will aspirate at times. Feels like food is getting stuck and sometimes swallowing is very painful. Does still take Prilosec 20 mg BID. Has seen GI in the past (?[facility])- review of Epic was Dr. Tabitha Cadena with [facility] [place]. It looks like she was last seen in 2013. Feels better when she doesn't eat. All foods bother her. Does sometimes choke on her larger pills. Does take longer to eat than the rest of her family. Did have a barium swallow in 2011 with decreased oropharyngeal sensation for timely palatal retraction for bolus transfer to pharynx with delayed swallow initiation. No aspiration. Also hurts in her jaw when she chews her food x 2 weeks. Does have referred pain in her ear when she eats. Feels like she has swelling her throat (?submandiular glands) when she eats. Does have a dentist but she was not having this problem she she was last seen. Is also complaining of swelling in both of her legs, left more than right. Started about 1 month ago. Does continue to hurt all over. Does follow with rheumatology. Advised to discuss her pain with her rheumatologist. Was initially diagnosed by Dr. Monica Rizo. On Lyrica and Cymbalta. Patient's primary language is Armenian. Interview is conducted without certified Armenian  per patient/ preference.  translates for her.     45 minutes were spent with patient today in face to face encounter, more than 50% of it was counseling regarding reviewing multiple complaints .                Allergies   Allergen Reactions    Iv Dye [Iodides] Anaphylaxis     allergic to ct scan dye, not the MRI    Basaglar Kwikpen [Insulin Glargine]      High blood sugar     Trazodone And Nefazodone Other (See Comments)     Makes patient feel very sluggish      Past Medical History:   Diagnosis Date    Anxiety     Anxiety and depression     Arthritis     not sure of specific type    Asthma     CAD (coronary artery disease)     Cancer ([facility] 2007    left breast    Cerebral artery occlusion with cerebral infarction ([facility]     CHF (congestive heart failure) ([facility] 2018    Chronic kidney disease     renal insufficency/to see Dr Keith Donaldson    Chronic pain     Constipation     COPD (chronic obstructive pulmonary disease) ([facility]     Depression     Diabetes mellitus ([facility]     Diabetic polyneuropathy associated with type 2 diabetes mellitus ([facility] 2/2/2018    Dysthymia 2/2/2018    ESBL (extended spectrum beta-lactamase) producing bacteria infection 03/14/2018    urine    Fibromyalgia     Gastric ulcer, unspecified as acute or chronic, without mention of hemorrhage, perforation, or obstruction     GERD (gastroesophageal reflux disease)     HIGH CHOLESTEROL     Hypertension     Hypothyroidism     Severe persistent asthma without complication 2/8/9297    Thyroid disease     TIA (transient ischemic attack)     with occasional left leg and hand weakness      Past Surgical History:   Procedure Laterality Date    BREAST SURGERY      left mastectomy    CARPAL TUNNEL RELEASE      Bilateral    FINGER CONTRACTURE SURGERY      HYSTERECTOMY      TONSILLECTOMY        Social History     Socioeconomic History    Marital status:      Spouse name: Leanor Reading Number of children: 3    Years of education: Not on file    Highest education level: Not on file   Occupational History    (SYNTHROID) 150 MCG tablet Take 1 tablet by mouth every morning (before breakfast) 30 tablet 3    B-D UF III MINI PEN NEEDLES 31G X 5 MM MISC use five times daily with insulin 500 each 5    senna-docusate (SENEXON-S) 8.6-50 MG per tablet TAKE 2 TABLETS BY MOUTH TWO TIMES A  tablet 3    metoprolol tartrate (LOPRESSOR) 25 MG tablet Take 1 tablet by mouth 2 times daily 90 tablet 3    allopurinol (ZYLOPRIM) 100 MG tablet Take 1 tablet by mouth daily 30 tablet 5    insulin aspart (NOVOLOG FLEXPEN) 100 UNIT/ML injection pen INJECT 30 UNITS SUBCUTANEOUSLY THREE TIMES DAILY BEFORE MEALS 15 mL 2    glipiZIDE (GLUCOTROL XL) 10 MG extended release tablet TAKE 1 TABLET BY MOUTH ONE TIME A DAY 30 tablet 0    Insulin Degludec 200 UNIT/ML SOPN Take 150 units daily 5 pen 3    empagliflozin (JARDIANCE) 10 MG tablet Take 1 tablet by mouth daily 30 tablet 3    Continuous Blood Gluc  (FREESTYLE EMERALD READER) MINDY To monitor glucose 1 Device 2    Continuous Blood Gluc Sensor (FREESTYLE EMERALD 14 DAY SENSOR) MISC Change every 14 days 2 each 5    metolazone (ZAROXOLYN) 2.5 MG tablet TAKE 1 TABLET BY MOUTH ONE TIME A DAY AS NEEDED (Patient taking differently: TAKE 1 TABLET BY Twice Weekly:  Sunday and Monday) 30 tablet 1    ferrous sulfate 325 (65 Fe) MG tablet Take 1 tablet by mouth 2 times daily (with meals) 180 tablet 3    polyethylene glycol (GLYCOLAX) powder Take 17 g by mouth nightly 17 g 3    omeprazole (PRILOSEC) 20 MG delayed release capsule Take 2 capsules by mouth 2 times daily For 2 months then resume 20 mg po bid (Patient taking differently: Take 20 mg by mouth 2 times daily ) 60 capsule 3    loratadine (CLARITIN) 10 MG tablet Take 1 tablet by mouth daily 90 tablet 1    colchicine (COLCRYS) 0.6 MG tablet Take 0.6 mg by mouth 2 times daily       torsemide (DEMADEX) 100 MG tablet TAKE 1 TABLET BY MOUTH IN THE MORNING AND 1/2 TABLET BY MOUTH IN THE EVENING 45 tablet 5    simvastatin (ZOCOR) 20 MG 60 mg by mouth 2 times daily.  furosemide (LASIX) 10 MG/ML injection Infuse 12 mLs intravenously once for 1 dose 12 mL 0    lubiprostone (AMITIZA) 8 MCG CAPS capsule Take 1 capsule by mouth 2 times daily (with meals) 60 capsule 1    Magic Mouthwash (MIRACLE MOUTHWASH) Swish and spit 5 mLs 4 times daily as needed for Irritation (mouth and throat pain) 300 mL 0    fluticasone (FLONASE) 50 MCG/ACT nasal spray 1 spray by Nasal route daily (Patient taking differently: 1 spray by Nasal route daily ) 1 Bottle 0    Compression Sleeve MISC by Does not apply route 1 sleeve, 20-30 mmHg 1 each 2     No facility-administered medications prior to visit. Patient'spast medical history, surgical history, family history, medications,  and allergies  were all reviewed and updated as appropriate today. Review of Systems   Constitutional: Positive for fever. Negative for appetite change and fatigue. HENT: Positive for dental problem, ear pain and trouble swallowing. Respiratory: Negative for chest tightness and shortness of breath. Cardiovascular: Positive for chest pain. Palpitations: chest wall pain. Gastrointestinal: Positive for abdominal pain, constipation and nausea. Negative for diarrhea. Musculoskeletal: Positive for arthralgias, back pain, gait problem and myalgias. Skin: Negative for rash. Psychiatric/Behavioral: Positive for dysphoric mood. /62   Pulse 80   Wt 185 lb (83.9 kg)   SpO2 93%   BMI 31.76 kg/m²   Physical Exam   Constitutional: She appears well-developed and well-nourished. She is cooperative. Non-toxic appearance. HENT:   Head: Normocephalic. Right Ear: Tympanic membrane, external ear and ear canal normal.   Left Ear: Tympanic membrane, external ear and ear canal normal.   Nose: Nose normal.   Mouth/Throat: Oropharynx is clear and moist and mucous membranes are normal.   Jaw tender to palpation.  Jaw deviated left upon initiation of opening and then shifts midline upon full opening of jaw. No clicking or popping of jaw. Neck: Carotid bruit is not present. No thyroid mass and no thyromegaly present. Cardiovascular: Normal rate, regular rhythm, normal heart sounds and intact distal pulses. No murmur heard. Pulses:       Dorsalis pedis pulses are 2+ on the right side, and 2+ on the left side. Non-pitting LE edema  Negative Guevara's b/l calves. Pulmonary/Chest: Effort normal and breath sounds normal. She exhibits tenderness. She exhibits no mass. Abdominal: Soft. Bowel sounds are decreased. There is generalized tenderness. Musculoskeletal:        Right lower leg: She exhibits no tenderness, no swelling and no edema. Left lower leg: She exhibits no tenderness, no swelling and no edema. Right foot: There is tenderness and swelling. There is normal range of motion. Left foot: There is tenderness and swelling. There is normal range of motion. Shins tender to palpation b/l. Lymphadenopathy:        Head (right side): Submandibular adenopathy present. Head (left side): Submandibular adenopathy present. She has no cervical adenopathy. Neurological: She is alert. Psychiatric: She exhibits a depressed mood. ASSESSMENT/PLAN:    Problem List Items Addressed This Visit     Fibromyalgia     Reviewed this diagnosis. On Lyrica 50 mg BID (does follow with pain management but also advised there is limited room for increasing her dose d/t CHF, LE edema) and Cymbalta 60 mg BID. Advised to d/w rheumatology and pain management. Immigrant with language difficulty     Declines certified interpretor. Relies on  for communication. I do wonder if there is a cultural reason for this as well. Oropharyngeal dysphagia     Suspect esophageal issue sensation of food sticking. Refer to GI. Reviewed swallow study from 2011 but her symptoms sound different than those from 2011 (based on barium swallow).  If GI w/u is unremarkable, may need to see ENT and/or Speech Therapy. Relevant Orders    External Referral To Gastroenterology    Constipation due to pain medication - Primary     Insurance would not cover Amitiza. Will try Linzess 72 mcg qd. Has not had improvement despite Miralax BID. Relevant Medications    Linaclotide (LINZESS) 72 MCG CAPS    Other Relevant Orders    External Referral To Gastroenterology    Enlargement of submandibular gland     Check soft tissue CT of neck. Relevant Orders    CT SOFT TISSUE NECK WO CONTRAST    Jaw pain     Suspect TMJ. Advised to f/u with her dentist.          Pain in both lower extremities     With swelling. Does have RA (on Arava) and fibromyalgia. Swelling is not new and doubt DVT. No s/s of CHF.             Other Visit Diagnoses     Positive depression screening        Relevant Orders    Positive Screen for Clinical Depression with a Documented Follow-up Plan  (Completed)          Current Outpatient Medications   Medication Sig Dispense Refill    Linaclotide (LINZESS) 72 MCG CAPS Take 72 mcg by mouth daily 30 capsule 2    polyethylene glycol (MIRALAX) PACK packet Take 17 g by mouth daily      montelukast (SINGULAIR) 10 MG tablet TAKE 1 TABLET BY MOUTH ONE TIME A DAY  30 tablet 4    B-D UF III MINI PEN NEEDLES 31G X 5 MM MISC use five times daily with insulin 200 each 3    levothyroxine (SYNTHROID) 150 MCG tablet Take 1 tablet by mouth every morning (before breakfast) 30 tablet 3    B-D UF III MINI PEN NEEDLES 31G X 5 MM MISC use five times daily with insulin 500 each 5    senna-docusate (SENEXON-S) 8.6-50 MG per tablet TAKE 2 TABLETS BY MOUTH TWO TIMES A  tablet 3    metoprolol tartrate (LOPRESSOR) 25 MG tablet Take 1 tablet by mouth 2 times daily 90 tablet 3    allopurinol (ZYLOPRIM) 100 MG tablet Take 1 tablet by mouth daily 30 tablet 5    insulin aspart (NOVOLOG FLEXPEN) 100 UNIT/ML injection pen INJECT 30 UNITS SUBCUTANEOUSLY THREE TIMES DAILY BEFORE MEALS 15 mL 2    glipiZIDE (GLUCOTROL XL) 10 MG extended release tablet TAKE 1 TABLET BY MOUTH ONE TIME A DAY 30 tablet 0    Insulin Degludec 200 UNIT/ML SOPN Take 150 units daily 5 pen 3    empagliflozin (JARDIANCE) 10 MG tablet Take 1 tablet by mouth daily 30 tablet 3    Continuous Blood Gluc  (FREESTYLE EMERALD READER) MINDY To monitor glucose 1 Device 2    Continuous Blood Gluc Sensor (FREESTYLE EMERALD 14 DAY SENSOR) MISC Change every 14 days 2 each 5    metolazone (ZAROXOLYN) 2.5 MG tablet TAKE 1 TABLET BY MOUTH ONE TIME A DAY AS NEEDED (Patient taking differently: TAKE 1 TABLET BY Twice Weekly:  Sunday and Monday) 30 tablet 1    ferrous sulfate 325 (65 Fe) MG tablet Take 1 tablet by mouth 2 times daily (with meals) 180 tablet 3    polyethylene glycol (GLYCOLAX) powder Take 17 g by mouth nightly 17 g 3    omeprazole (PRILOSEC) 20 MG delayed release capsule Take 2 capsules by mouth 2 times daily For 2 months then resume 20 mg po bid (Patient taking differently: Take 20 mg by mouth 2 times daily ) 60 capsule 3    loratadine (CLARITIN) 10 MG tablet Take 1 tablet by mouth daily 90 tablet 1    colchicine (COLCRYS) 0.6 MG tablet Take 0.6 mg by mouth 2 times daily       torsemide (DEMADEX) 100 MG tablet TAKE 1 TABLET BY MOUTH IN THE MORNING AND 1/2 TABLET BY MOUTH IN THE EVENING 45 tablet 5    simvastatin (ZOCOR) 20 MG tablet Take 1 tablet by mouth nightly 90 tablet 3    spironolactone (ALDACTONE) 50 MG tablet 100mg morning, 50mg evening (Patient taking differently: Take 200 mg by mouth daily 100mg morning, 50mg evening) 270 tablet 3    albuterol sulfate  (90 Base) MCG/ACT inhaler Inhale 2 puffs into the lungs every 6 hours as needed for Wheezing (Patient taking differently: Inhale 2 puffs into the lungs 2 times daily ) 1 Inhaler 6    OXYGEN Inhale 3 L into the lungs nightly Sometimes with activity      oxyCODONE (OXYCONTIN) 20 MG extended release tablet Take 20 mg by mouth every 12 hours.  aspirin 81 MG EC tablet Take 81 mg by mouth daily      sulfaSALAzine (AZULFIDINE) 500 MG tablet Take 500 mg by mouth 2 times daily      calcium carbonate-vitamin D (CALCIUM 600+D) 600-200 MG-UNIT TABS Take 1 tablet by mouth 2 times daily      benztropine (COGENTIN) 1 MG tablet Take 1 mg by mouth nightly       Cholecalciferol (VITAMIN D) 2000 units CAPS capsule Take 1 capsule by mouth daily (with breakfast)      folic acid (FOLVITE) 1 MG tablet Take 1 mg by mouth daily      lurasidone (LATUDA) 40 MG TABS tablet Take 40 mg by mouth Daily with supper       leflunomide (ARAVA) 20 MG tablet Take 20 mg by mouth daily      albuterol (ACCUNEB) 1.25 MG/3ML nebulizer solution Inhale 1 ampule into the lungs every 6 hours as needed for Wheezing      mometasone-formoterol (DULERA) 200-5 MCG/ACT inhaler Inhale 2 puffs into the lungs every 12 hours      oxyCODONE-acetaminophen (PERCOCET)  MG per tablet Take 1 tablet by mouth every 4 hours as needed for Pain . Earliest Fill Date: 6/8/17 100 tablet 0    Elastic Bandages & Supports (FUTURO SUPPORT GLOVE MEDIUM) MISC 1 ready made support glove 1 each 2    ALPRAZolam (XANAX) 1 MG tablet Take 1 mg by mouth 2 times daily .  LYRICA 50 MG capsule TAKE 1 CAPSULE BY MOUTH TWICE DAILY (Patient taking differently: 1 tab BID) 60 capsule 0    duloxetine (CYMBALTA) 60 MG capsule Take 60 mg by mouth 2 times daily.  nitroGLYCERIN (NITROSTAT) 0.4 MG SL tablet Place 1 tablet under the tongue every 5 minutes as needed for Chest pain up to max of 3 total doses.  If no relief after 1 dose, call 911. 25 tablet 1    furosemide (LASIX) 10 MG/ML injection Infuse 12 mLs intravenously once for 1 dose 12 mL 0    Magic Mouthwash (MIRACLE MOUTHWASH) Swish and spit 5 mLs 4 times daily as needed for Irritation (mouth and throat pain) 300 mL 0    fluticasone (FLONASE) 50 MCG/ACT nasal spray 1 spray by Nasal route daily (Patient taking differently: 1 spray

## 2019-04-10 NOTE — PATIENT INSTRUCTIONS
Please see your dentist about your jaw pain. I think you may have some TMJ. Patient Education        Temporomandibular Disorder: Care Instructions  Your Care Instructions    Temporomandibular (TM) disorders are a problem with the muscles and joints that connect your jaw to your skull. They cause pain when you open your mouth, chew, or yawn. You may feel this pain on one or both sides. TM disorders are often caused by tight jaw muscles. The tightness can be caused by clenching or grinding your teeth. This may happen when you have a lot of stress in your life. If you lower your stress, you may be able to stop clenching or grinding your teeth. This will help relax your jaw and reduce your pain. You may also be able to do some things at home to feel better. But if none of this works, your doctor may prescribe medicine to help relax your muscles and control the pain. Follow-up care is a key part of your treatment and safety. Be sure to make and go to all appointments, and call your doctor if you are having problems. It's also a good idea to know your test results and keep a list of the medicines you take. How can you care for yourself at home? · Put a warm, moist cloth or heating pad set on low on your jaw. Do this for 10 to 20 minutes at a time. Put a thin cloth between the heating pad and your skin. · Avoid hard or chewy foods that cause your jaws to work very hard. Examples include popcorn, jerky, tough meats, chewy breads, gum, and raw apples and carrots. · Choose softer foods that are easy to chew. These include eggs, yogurt, and soup. · Cut your food into small pieces. Chew slowly. · If your jaw gets too painful to chew, or if it locks, you may need to puree your food for a few days or weeks. · To relax your jaw, repeat this exercise for a few minutes every morning and evening. Watch yourself in a mirror. Gently open and close your mouth. Move your jaw straight up and down.  But don't do this if it makes your pain worse. · Get at least 30 minutes of exercise on most days of the week to relieve stress. Walking is a good choice. You also may want to do other activities, such as running, swimming, cycling, or playing tennis or team sports. · Do not:  ? Hold a phone between your shoulder and your jaw. ? Open your mouth all the way, like when you sing loudly or yawn. ? Clench or grind your teeth, bite your lips, or chew your fingernails. ? Clench things such as pens, pipes, or cigars between your teeth. When should you call for help? Call your doctor now or seek immediate medical care if:    · Your jaw is locked open or shut or it is hard to move your jaw.    Watch closely for changes in your health, and be sure to contact your doctor if:    · Your jaw pain gets worse.     · Your face is swollen.     · You do not get better as expected. Where can you learn more? Go to https://[email or web address] USAeb.MIT CSHub. org and sign in to your [employer] account. Enter D155 in the VHSquared box to learn more about \"Temporomandibular Disorder: Care Instructions. \"     If you do not have an account, please click on the \"Sign Up Now\" link. Current as of: March 27, 2018  Content Version: 11.9  © 8075-0048 [employer]. Care instructions adapted under license by [facility] ([facility]). If you have questions about a medical condition or this instruction, always ask your healthcare professional. [place] [address] any warranty or liability for your use of this information.

## 2019-04-11 ENCOUNTER — TELEPHONE (OUTPATIENT)
Dept: RHEUMATOLOGY | Age: 49
End: 2019-04-11

## 2019-04-12 NOTE — TELEPHONE ENCOUNTER
Received DENIAL for Linzess [facility] CAPS. Denial letter attached. Please advise patient. Thank you.

## 2019-04-15 ENCOUNTER — TELEPHONE (OUTPATIENT)
Dept: INTERNAL MEDICINE CLINIC | Age: 49
End: 2019-04-15

## 2019-04-15 ENCOUNTER — HOSPITAL ENCOUNTER (OUTPATIENT)
Age: 49
Setting detail: SPECIMEN
Discharge: HOME | End: 2019-04-15
Payer: COMMERCIAL

## 2019-04-15 LAB
ANION GAP SERPL CALCULATED.3IONS-SCNC: 12 MMOL/L (ref 3–16)
BUN BLDV-MCNC: 30 MG/DL (ref 7–20)
CALCIUM SERPL-MCNC: 8.9 MG/DL (ref 8.3–10.6)
CHLORIDE BLD-SCNC: 94 MMOL/L (ref 99–110)
CO2: 30 MMOL/L (ref 21–32)
CREAT SERPL-MCNC: 1.1 MG/DL (ref 0.6–1.1)
GFR AFRICAN AMERICAN: >60
GFR NON-AFRICAN AMERICAN: 53
GLUCOSE BLD-MCNC: 376 MG/DL (ref 70–99)
POTASSIUM SERPL-SCNC: 4.3 MMOL/L (ref 3.5–5.1)
PRO-BNP: 168 PG/ML (ref 0–124)
SODIUM BLD-SCNC: 136 MMOL/L (ref 136–145)
URIC ACID, SERUM: 7.1 MG/DL (ref 2.6–6)

## 2019-04-15 PROCEDURE — 83880 ASSAY OF NATRIURETIC PEPTIDE: CPT

## 2019-04-15 PROCEDURE — 84550 ASSAY OF BLOOD/URIC ACID: CPT

## 2019-04-15 PROCEDURE — 80048 BASIC METABOLIC PNL TOTAL CA: CPT

## 2019-04-15 PROCEDURE — 36415 COLL VENOUS BLD VENIPUNCTURE: CPT

## 2019-04-15 NOTE — TELEPHONE ENCOUNTER
Rama with [facility] calling-pt is trying to get CT of neck but the insurance says she has to wait 5days to get the CT. It is currently sched for  4/24. Is there anyway to call pt's insurance to see if it can be scheduled sooner? He jaw is popping and cracking when she speaks. The swelling is visible you do not even have to palpate her neck to see the swelling. Jeremy Becerril is dropping off labs today at [facility] if you want to add on any thyroid studies.

## 2019-04-15 NOTE — TELEPHONE ENCOUNTER
I do not think that 4/24 is unreasonable. She has been asked to see her dentist about her jaw. If she is having difficulty breathing d/t the swelling, she will need to go to ED.  saw

## 2019-04-16 ENCOUNTER — TELEPHONE (OUTPATIENT)
Dept: INTERNAL MEDICINE CLINIC | Age: 49
End: 2019-04-16

## 2019-04-16 NOTE — TELEPHONE ENCOUNTER
Called [facility] to get [facility] telephone number [phone]. Left for Rama to call me back.

## 2019-04-17 NOTE — TELEPHONE ENCOUNTER
Rama called back. Message conveyed. She said there is no difficulty breathing but she is having difficulty swallowing.

## 2019-04-17 NOTE — TELEPHONE ENCOUNTER
Received DENIAL for Linzess [facility] [place]. Denial letter attached. Please advise patient. Thank you.

## 2019-04-17 NOTE — TELEPHONE ENCOUNTER
Called Rama back. Got VM.  Left detailed message that patient needs to make an appointment with NURYS Mat[facility].

## 2019-04-22 NOTE — ASSESSMENT & PLAN NOTE
Insurance would not cover Amitiza. Will try Linzess 72 mcg qd. Has not had improvement despite Miralax BID.

## 2019-04-22 NOTE — ASSESSMENT & PLAN NOTE
Suspect esophageal issue sensation of food sticking. Refer to GI. Reviewed swallow study from 2011 but her symptoms sound different than those from 2011 (based on barium swallow). If GI w/u is unremarkable, may need to see ENT and/or Speech Therapy.

## 2019-04-22 NOTE — ASSESSMENT & PLAN NOTE
Declines certified interpretor. Relies on  for communication. I do wonder if there is a cultural reason for this as well.

## 2019-04-23 ENCOUNTER — HOSPITAL ENCOUNTER (OUTPATIENT)
Dept: CT IMAGING | Age: 49
Discharge: HOME | End: 2019-04-23
Payer: COMMERCIAL

## 2019-04-23 DIAGNOSIS — K11.1 ENLARGEMENT OF SUBMANDIBULAR GLAND: ICD-10-CM

## 2019-04-23 PROCEDURE — 70490 CT SOFT TISSUE NECK W/O DYE: CPT

## 2019-04-25 DIAGNOSIS — K59.03 CONSTIPATION DUE TO PAIN MEDICATION: ICD-10-CM

## 2019-04-25 DIAGNOSIS — I25.10 CORONARY ARTERY DISEASE INVOLVING NATIVE CORONARY ARTERY OF NATIVE HEART WITHOUT ANGINA PECTORIS: Chronic | ICD-10-CM

## 2019-04-25 DIAGNOSIS — N28.9 RENAL INSUFFICIENCY: Chronic | ICD-10-CM

## 2019-04-25 DIAGNOSIS — R06.02 SOB (SHORTNESS OF BREATH): Chronic | ICD-10-CM

## 2019-04-25 DIAGNOSIS — I10 ESSENTIAL HYPERTENSION: ICD-10-CM

## 2019-04-25 DIAGNOSIS — E03.9 HYPOTHYROIDISM, UNSPECIFIED TYPE: Primary | ICD-10-CM

## 2019-04-25 DIAGNOSIS — I50.30 (HFPEF) HEART FAILURE WITH PRESERVED EJECTION FRACTION (HCC): Chronic | ICD-10-CM

## 2019-04-25 DIAGNOSIS — I50.22 SYSTOLIC CHF, CHRONIC (HCC): ICD-10-CM

## 2019-04-25 DIAGNOSIS — I25.10 CORONARY ARTERY DISEASE INVOLVING NATIVE CORONARY ARTERY OF NATIVE HEART WITHOUT ANGINA PECTORIS: ICD-10-CM

## 2019-04-25 RX ORDER — LEVOTHYROXINE SODIUM 137 UG/1
TABLET ORAL
Qty: 30 TABLET | Refills: 2 | OUTPATIENT
Start: 2019-04-25

## 2019-04-25 RX ORDER — METOLAZONE 2.5 MG/1
TABLET ORAL
Qty: 30 TABLET | Refills: 0 | Status: SHIPPED | OUTPATIENT
Start: 2019-04-25 | End: 2020-06-22

## 2019-04-25 RX ORDER — TORSEMIDE 100 MG/1
TABLET ORAL
Qty: 45 TABLET | Refills: 4 | Status: SHIPPED | OUTPATIENT
Start: 2019-04-25 | End: 2019-09-22 | Stop reason: SDUPTHER

## 2019-04-25 NOTE — TELEPHONE ENCOUNTER
Lov 4/3/2019 RTO in 2 months  Labs 4/15/2019  Lrf Zaroxolyn 2.5 mg 1/28/2019 Disp 30+1 Demadex 100 mg 10/11/2018 Disp 45+5  Appt 6/5/2019    Call placed to patient for weight, and to see he is having edema. No answer.

## 2019-04-25 NOTE — TELEPHONE ENCOUNTER
FOV 5/13/19  LOV 2/12/19    Pt is not taking 137mcg. Pt  would like thyroid test added, pt will have nurse coming out soon to draw blood.

## 2019-04-26 RX ORDER — DOCUSATE SODIUM AND SENNOSIDES 8.6; 5 MG/1; MG/1
TABLET, FILM COATED ORAL
Qty: 120 TABLET | Refills: 0 | Status: SHIPPED | OUTPATIENT
Start: 2019-04-26 | End: 2019-05-28

## 2019-04-29 ENCOUNTER — HOSPITAL ENCOUNTER (OUTPATIENT)
Age: 49
Setting detail: SPECIMEN
Discharge: HOME | End: 2019-04-29
Payer: COMMERCIAL

## 2019-04-29 LAB
ANION GAP SERPL CALCULATED.3IONS-SCNC: 14 MMOL/L (ref 3–16)
BUN BLDV-MCNC: 39 MG/DL (ref 7–20)
CALCIUM SERPL-MCNC: 10.2 MG/DL (ref 8.3–10.6)
CHLORIDE BLD-SCNC: 96 MMOL/L (ref 99–110)
CO2: 27 MMOL/L (ref 21–32)
CREAT SERPL-MCNC: 1.1 MG/DL (ref 0.6–1.1)
GFR AFRICAN AMERICAN: >60
GFR NON-AFRICAN AMERICAN: 53
GLUCOSE BLD-MCNC: 96 MG/DL (ref 70–99)
POTASSIUM SERPL-SCNC: 3.8 MMOL/L (ref 3.5–5.1)
PRO-BNP: 52 PG/ML (ref 0–124)
SODIUM BLD-SCNC: 137 MMOL/L (ref 136–145)
URIC ACID, SERUM: 10.1 MG/DL (ref 2.6–6)

## 2019-04-29 PROCEDURE — 84550 ASSAY OF BLOOD/URIC ACID: CPT

## 2019-04-29 PROCEDURE — 84439 ASSAY OF FREE THYROXINE: CPT

## 2019-04-29 PROCEDURE — 36415 COLL VENOUS BLD VENIPUNCTURE: CPT

## 2019-04-29 PROCEDURE — 80048 BASIC METABOLIC PNL TOTAL CA: CPT

## 2019-04-29 PROCEDURE — 83880 ASSAY OF NATRIURETIC PEPTIDE: CPT

## 2019-04-29 PROCEDURE — 84443 ASSAY THYROID STIM HORMONE: CPT

## 2019-04-30 ENCOUNTER — TELEPHONE (OUTPATIENT)
Dept: ENDOCRINOLOGY | Age: 49
End: 2019-04-30

## 2019-04-30 LAB
T4 FREE: 1.2 NG/DL (ref 0.9–1.8)
TSH SERPL DL<=0.05 MIU/L-ACNC: 1.21 UIU/ML (ref 0.27–4.2)

## 2019-05-02 ENCOUNTER — TELEPHONE (OUTPATIENT)
Dept: CARDIOLOGY CLINIC | Age: 49
End: 2019-05-02

## 2019-05-02 NOTE — TELEPHONE ENCOUNTER
----- Message from Saad Rea sent at 5/2/2019  8:21 AM EDT -----      ----- Message -----  From: Barbi Wyman MD  Sent: 5/1/2019  10:36 PM  To: [facility] pt. Labs look good. Thyroid labs are now normal.  bnp level is down. No changes.   ARETHA

## 2019-05-03 NOTE — TELEPHONE ENCOUNTER
Please advise that her thyroid fx test were normal and her glucose was perfect that day   She is noted to have high uric acid in her blood work which was probably ordered by her PCP so he should call them for further evaluation of that --it shows numbers in Gout range and her PCP might treat her for that

## 2019-05-07 ENCOUNTER — OFFICE VISIT (OUTPATIENT)
Dept: ENDOCRINOLOGY | Age: 49
End: 2019-05-07
Payer: COMMERCIAL

## 2019-05-07 VITALS
SYSTOLIC BLOOD PRESSURE: 118 MMHG | WEIGHT: 188 LBS | HEIGHT: 64 IN | BODY MASS INDEX: 32.1 KG/M2 | HEART RATE: 83 BPM | DIASTOLIC BLOOD PRESSURE: 78 MMHG | OXYGEN SATURATION: 98 %

## 2019-05-07 DIAGNOSIS — I25.10 CORONARY ARTERY DISEASE INVOLVING NATIVE CORONARY ARTERY OF NATIVE HEART WITHOUT ANGINA PECTORIS: ICD-10-CM

## 2019-05-07 PROCEDURE — 3017F COLORECTAL CA SCREEN DOC REV: CPT | Performed by: INTERNAL MEDICINE

## 2019-05-07 PROCEDURE — G8598 ASA/ANTIPLAT THER USED: HCPCS | Performed by: INTERNAL MEDICINE

## 2019-05-07 PROCEDURE — 3045F PR MOST RECENT HEMOGLOBIN A1C LEVEL 7.0-9.0%: CPT | Performed by: INTERNAL MEDICINE

## 2019-05-07 PROCEDURE — 2022F DILAT RTA XM EVC RTNOPTHY: CPT | Performed by: INTERNAL MEDICINE

## 2019-05-07 PROCEDURE — 1036F TOBACCO NON-USER: CPT | Performed by: INTERNAL MEDICINE

## 2019-05-07 PROCEDURE — G8427 DOCREV CUR MEDS BY ELIG CLIN: HCPCS | Performed by: INTERNAL MEDICINE

## 2019-05-07 PROCEDURE — 99215 OFFICE O/P EST HI 40 MIN: CPT | Performed by: INTERNAL MEDICINE

## 2019-05-07 PROCEDURE — G8417 CALC BMI ABV UP PARAM F/U: HCPCS | Performed by: INTERNAL MEDICINE

## 2019-05-07 RX ORDER — BLOOD-GLUCOSE METER
KIT MISCELLANEOUS
Qty: 1 DEVICE | Refills: 0 | Status: SHIPPED | OUTPATIENT
Start: 2019-05-07 | End: 2020-03-25 | Stop reason: ALTCHOICE

## 2019-05-07 RX ORDER — BLOOD-GLUCOSE METER
KIT MISCELLANEOUS
Qty: 400 STRIP | Refills: 0 | Status: SHIPPED | OUTPATIENT
Start: 2019-05-07 | End: 2019-08-19 | Stop reason: SDUPTHER

## 2019-05-07 RX ORDER — GLUCOSAMINE HCL 500 MG
3000 TABLET ORAL DAILY
Qty: 30 TABLET | Refills: 3 | Status: SHIPPED | OUTPATIENT
Start: 2019-05-07 | End: 2019-05-15 | Stop reason: DRUGHIGH

## 2019-05-07 NOTE — PROGRESS NOTES
Doug Salinas is a 47 y.o. female is seen for the management of type 2 diabetes and hypothyroidism . Patient has complex medical problems including coronary artery disease , DOMENIC, CHF, breast cancer, fibromyalgia, hyperlipidemia, hypertension, obesity, asthma and depression  T2DM which is uncontrolled and is complicated with nephropathy and DOMENIC . She got diabetic education in the past and at one point she was on an insulin pump. She still has hypoglycemia awareness tends to watch her diet somewhat but her depression hinders in managing her diabetes. Most of her medications are managed by her . Maylin Loyd was diagnosed with hypothyroidism in march 2010 and has been on Lt4 since then   she had GDM with her 2 kids ---she has been on insulin for years she has been suffering form DAN and Peripheral neuropathy alonng with Depression and has been on Lyrica & Cymbalta with suoptimal relief she has s/s suggestive of DAN gastroparesis , orthostasis. Maylin Loyd also has hypertension , GERD , hyperlipidemia ,she also has breast cancer s/p mastectomy and radiation and chemo &is on Tamixifen   she also has Asthma she is on Cpap for sleep apnea      INTERIM:    Diabetes   She presents for her follow-up diabetic visit. She has type 2 diabetes mellitus. No MedicAlert identification noted. The initial diagnosis of diabetes was made 17 years ago. Her disease course has been stable. Hypoglycemia symptoms include nervousness/anxiousness and sweats. Pertinent negatives for hypoglycemia include no dizziness or headaches. Associated symptoms include fatigue and weakness. There are no hypoglycemic complications. Symptoms are stable. Diabetic complications include autonomic neuropathy, heart disease and peripheral neuropathy. Risk factors for coronary artery disease include diabetes mellitus, post-menopausal, hypertension and dyslipidemia.  Current diabetic treatment includes intensive insulin program. She is  CAD (coronary artery disease)     Cancer ([facility] 2007    left breast    Cerebral artery occlusion with cerebral infarction ([facility]     CHF (congestive heart failure) ([facility] 2018    Chronic kidney disease     renal insufficency/to see Dr Keith Donaldson    Chronic pain     Constipation     COPD (chronic obstructive pulmonary disease) ([facility]     Depression     Diabetes mellitus ([facility]     Diabetic polyneuropathy associated with type 2 diabetes mellitus ([facility] 2/2/2018    Dysthymia 2/2/2018    ESBL (extended spectrum beta-lactamase) producing bacteria infection 03/14/2018    urine    Fibromyalgia     Gastric ulcer, unspecified as acute or chronic, without mention of hemorrhage, perforation, or obstruction     GERD (gastroesophageal reflux disease)     HIGH CHOLESTEROL     Hypertension     Hypothyroidism     Severe persistent asthma without complication 9/0/0599    Thyroid disease     TIA (transient ischemic attack)     with occasional left leg and hand weakness      Patient Active Problem List   Diagnosis    Fibromyalgia    Iron deficiency anemia    Malignant neoplasm of overlapping sites of left female breast ([facility]    Chronic pain    Lymphedema of upper extremity following lymphadenectomy    Encounter for monitoring aromatase inhibitor therapy    Encounter for follow-up surveillance of breast cancer    Hyperlipidemia LDL goal <70    Essential hypertension    Poorly controlled type 2 diabetes mellitus ([facility]    Asthma    Hypothyroidism    Anxiety and depression    Hx of breast cancer    Hypoxia    Hypervolemia    SOB (shortness of breath)    Hiatal hernia with GERD    LUIS (obstructive sleep apnea)    Coronary artery disease involving native coronary artery of native heart without angina pectoris    Severe persistent asthma without complication    Diabetic polyneuropathy associated with type 2 diabetes mellitus ([facility]    Chronic congestive heart failure (HCC)    (HFpEF) heart failure with preserved ejection fraction [facility]    Coronary artery disease due to lipid rich plaque    RA (rheumatoid arthritis) (HCC)    MARIA D (acute kidney injury) ([facility]    Urinary tract infection due to ESBL Klebsiella    Dental infection    Pyelonephritis    History of nephrolithiasis    Immigrant with language difficulty    Class 1 obesity due to excess calories with body mass index (BMI) of 31.0 to 31.9 in adult    H/O TIA (transient ischemic attack) and stroke    Need for isolation    Encounter for medication counseling    Hesitancy of micturition    Weight loss counseling, encounter for    Complex care coordination    Oropharyngeal dysphagia    Constipation due to pain medication    Right hand pain    Fungal dermatitis    Mouth pain    Environmental and seasonal allergies    Hypersomnolence    Enlargement of submandibular gland    Jaw pain    Pain in both lower extremities     Past Surgical History:   Procedure Laterality Date    BREAST SURGERY      left mastectomy    CARPAL TUNNEL RELEASE      Bilateral    FINGER CONTRACTURE SURGERY      HYSTERECTOMY      TONSILLECTOMY       Social History     Socioeconomic History    Marital status:      Spouse name: Antionette Conley Number of children: 3    Years of education: Not on file    Highest education level: Not on file   Occupational History    Occupation: disabled   Social Needs    Financial resource strain: Not on file    Food insecurity:     Worry: Not on file     Inability: Not on file   [email or web address] needs:     Medical: Not on file     Non-medical: Not on file   Tobacco Use    Smoking status: Never Smoker    Smokeless tobacco: Never Used   Substance and Sexual Activity    Alcohol use: No    Drug use: No    Sexual activity: Not on file   Lifestyle    Physical activity:     Days per week: Not on file     Minutes per session: Not on file    Stress: Not on file   Relationships    Social connections:     Talks on (SINGULAIR) 10 MG tablet TAKE 1 TABLET BY MOUTH ONE TIME A DAY  30 tablet 4    B-D UF III MINI PEN NEEDLES 31G X 5 MM MISC use five times daily with insulin 200 each 3    levothyroxine (SYNTHROID) 150 MCG tablet Take 1 tablet by mouth every morning (before breakfast) 30 tablet 3    B-D UF III MINI PEN NEEDLES 31G X 5 MM MISC use five times daily with insulin 500 each 5    metoprolol tartrate (LOPRESSOR) 25 MG tablet Take 1 tablet by mouth 2 times daily 90 tablet 3    allopurinol (ZYLOPRIM) 100 MG tablet Take 1 tablet by mouth daily 30 tablet 5    glipiZIDE (GLUCOTROL XL) 10 MG extended release tablet TAKE 1 TABLET BY MOUTH ONE TIME A DAY 30 tablet 0    Insulin Degludec 200 UNIT/ML SOPN Take 150 units daily 5 pen 3    empagliflozin (JARDIANCE) 10 MG tablet Take 1 tablet by mouth daily 30 tablet 3    Continuous Blood Gluc  (FREESTYLE EMERALD READER) MINDY To monitor glucose 1 Device 2    Continuous Blood Gluc Sensor (FREESTYLE EMERALD 14 DAY SENSOR) MISC Change every 14 days 2 each 5    ferrous sulfate 325 (65 Fe) MG tablet Take 1 tablet by mouth 2 times daily (with meals) 180 tablet 3    polyethylene glycol (GLYCOLAX) powder Take 17 g by mouth nightly 17 g 3    omeprazole (PRILOSEC) 20 MG delayed release capsule Take 2 capsules by mouth 2 times daily For 2 months then resume 20 mg po bid (Patient taking differently: Take 20 mg by mouth 2 times daily ) 60 capsule 3    loratadine (CLARITIN) 10 MG tablet Take 1 tablet by mouth daily 90 tablet 1    colchicine (COLCRYS) 0.6 MG tablet Take 0.6 mg by mouth 2 times daily       simvastatin (ZOCOR) 20 MG tablet Take 1 tablet by mouth nightly 90 tablet 3    spironolactone (ALDACTONE) 50 MG tablet 100mg morning, 50mg evening (Patient taking differently: Take 200 mg by mouth daily 100mg morning, 50mg evening) 270 tablet 3    albuterol sulfate  (90 Base) MCG/ACT inhaler Inhale 2 puffs into the lungs every 6 hours as needed for Wheezing (Patient taking differently: Inhale 2 puffs into the lungs 2 times daily ) 1 Inhaler 6    OXYGEN Inhale 3 L into the lungs nightly Sometimes with activity      oxyCODONE (OXYCONTIN) 20 MG extended release tablet Take 20 mg by mouth every 12 hours.  aspirin 81 MG EC tablet Take 81 mg by mouth daily      sulfaSALAzine (AZULFIDINE) 500 MG tablet Take 500 mg by mouth 2 times daily      calcium carbonate-vitamin D (CALCIUM 600+D) 600-200 MG-UNIT TABS Take 1 tablet by mouth 2 times daily      benztropine (COGENTIN) 1 MG tablet Take 1 mg by mouth nightly       Cholecalciferol (VITAMIN D) 2000 units CAPS capsule Take 1 capsule by mouth daily (with breakfast)      folic acid (FOLVITE) 1 MG tablet Take 1 mg by mouth daily      lurasidone (LATUDA) 40 MG TABS tablet Take 40 mg by mouth Daily with supper       leflunomide (ARAVA) 20 MG tablet Take 20 mg by mouth daily      albuterol (ACCUNEB) 1.25 MG/3ML nebulizer solution Inhale 1 ampule into the lungs every 6 hours as needed for Wheezing      mometasone-formoterol (DULERA) 200-5 MCG/ACT inhaler Inhale 2 puffs into the lungs every 12 hours      fluticasone (FLONASE) 50 MCG/ACT nasal spray 1 spray by Nasal route daily (Patient taking differently: 1 spray by Nasal route daily ) 1 Bottle 0    oxyCODONE-acetaminophen (PERCOCET)  MG per tablet Take 1 tablet by mouth every 4 hours as needed for Pain . Earliest Fill Date: 6/8/17 100 tablet 0    Elastic Bandages & Supports (FUTURO SUPPORT GLOVE MEDIUM) MISC 1 ready made support glove 1 each 2    Compression Sleeve MISC by Does not apply route 1 sleeve, 20-30 mmHg 1 each 2    ALPRAZolam (XANAX) 1 MG tablet Take 1 mg by mouth 2 times daily .  LYRICA 50 MG capsule TAKE 1 CAPSULE BY MOUTH TWICE DAILY (Patient taking differently: 1 tab BID) 60 capsule 0    duloxetine (CYMBALTA) 60 MG capsule Take 60 mg by mouth 2 times daily.         furosemide (LASIX) 10 MG/ML injection Infuse 12 mLs intravenously once for 1 dose 12 mL 0 No current facility-administered medications for this visit.       Allergies   Allergen Reactions    Iv Dye [Iodides] Anaphylaxis     allergic to ct scan dye, not the MRI    Basaglar Kwikpen [Insulin Glargine]      High blood sugar     Trazodone And Nefazodone Other (See Comments)     Makes patient feel very sluggish     Family Status   Relation Name Status    Other family h/o Alive    Father      Mother  Alive    Sister  Alive    Brother  Alive       Review of Systems  Constitutional:positive  fatigue, no fever, + recent weight gain, no recent weight loss, no changes in appetite  Eyes: no eye pain, no change in vision, no eye redness, no eye irritation, no double vision  Ears, nose, throat: no nasal congestion, no sore throat, no earache, no decrease in hearing, no hoarseness, no dry mouth, no sinus problems, no difficulty swallowing, no neck lumps, no dental problems, no mouth sores, no ringing in ears  Pulmonary: + shortness of breath, no wheezing, +dyspnea on exertion, no cough  Cardiovascular: no chest pain, +lower extremity edema, no orthopnea,  Gastrointestinal: no abdominal pain, no nausea, no vomiting, no diarrhea, no constipation, no dysphagia, no heartburn, no bloating  Genitourinary: no dysuria, no urinary incontinence, no urinary hesitancy, no urinary frequency, no feelings of urinary urgency, no nocturia  Musculoskeletal: + joint swelling, + joint stiffness,+ joint pain,  Integument/Breast: hair loss, but no skin rashes, no skin lesions, no itching, no dry skin,   , no nipple discharge  Neurological:+ numbness, + tingling, no weakness, no confusion, no headaches, + dizziness, no fainting, no tremors, no decrease in memory, +balance problems  Psychiatric: +anxiety, + depression, + insomnia, no change in personality, + emotional problems,+ stress  Hematologic/Lymphatic: no tendency for easy bleeding, no swollen lymph nodes, no tendency for easy bruising  Immunology: no seasonal allergies, no frequent infections, no frequent illnesses  Endocrine: no temperature intolerance no hirsutism,     OBJECTIVE:  /78   Pulse 83   Ht 5' 4\" (1.626 m)   Wt 188 lb (85.3 kg)   SpO2 98%   BMI 32.27 kg/m²    Wt Readings from Last 3 Encounters:   05/07/19 188 lb (85.3 kg)   04/10/19 185 lb (83.9 kg)   04/03/19 185 lb (83.9 kg)       Constitutional: no acute distress, well appearing and well nourished  Psychiatric: oriented to person, place and time, judgement and insight and normal, recent and remote memory and intact and mood and affect are depressed   Skin: skin and subcutaneous tissue is normal without mass, normal turgor  Head and Face: examination of head and face revealed no abnormalities  Eyes: no lid or conjunctival swelling, erythema or discharge, Ears/Nose: external inspection of ears and nose revealed no abnormalities, hearing is grossly normal  Oropharynx/Mouth/Face: lips, tongue and gums are normal with no lesions, the voice quality was normal  Neck: neck is supple and symmetric, with midline trachea and no masses, thyroid is normal  Pulmonary: no increased work of breathing or signs of respiratory distress, lungs are clear to auscultation  Cardiovascular: normal heart rate and rhythm, normal S1 and S2, + edema     Musculoskeletal: normal gait and station and exam of the digits and nails are normal  Neurological: normal coordination and normal general cortical function      Lab Results   Component Value Date    LABA1C 8.8 02/18/2019    LABA1C 9.9 01/21/2019    LABA1C 7.6 08/02/2018         ASSESSMENT/PLAN:  1.  Type 2 diabetes mellitus with  complication, with long-term current use of insulin    also appears to have DAN and gastroparesis --UNCONTROLLED   she has no documented hypoglycemia on her downloaded fingerstick glucose data  She still has erratic sleeping and eating habits   Currently taking 150 units of tresiba --- advised patient to reduce tresiba to 100 units in the past I'm not sure what exact doses they are currently taking. Patient tends to take 30-50 units of Humalog with the meals  Due to no exact sleeping pattern for this patient she continues to have wide fluctuations in her fingerstick glucoses are very hard to control. Patient does not think it is  related although when patient wore the CGM for me most of her glycemic radiations were from dietary indiscretion   --In an effort to improve compliance I provided them with a sample of VGO 40 , the patient is supposed to call me if she is unable to use that device as I can set up a meeting with the rep   She has been taking 10 milligrams of Jardiance daily her  wants to increase the dose they were advised again stated as the patient takes a lot of diuretics and I do not want her to become extremely dehydrated this was discussed in great details with the patient. Her  was advised that if she increases the dose of her diuretics or her diabetes medication she would need to have blood work drawn a week after that change  She continues to take Glucotrol XL 10 milligrams daily without any hypoglycemia. I discussed in detail with patient side effects associated with SGLT 2 Inhibitors including   Increased incidence of foot amputations   and Mukul's gangrene which can present with   Genital tenderness  Redness or swelling of the genitalia  Fever above 100.4 F  The FDA has issued a warning so far , after discussing with patient she decided to stay on the current regimen. --- Due to her hypoxemia and MARIA D I stopped the metformin In 2018  --- She is currently on 10 mg of Glucotrol XL patient and her  were warned about hypoglycemia associated with this medication   ---Her depression has been hindering in her optimal glycemic control ---she claims to eat only one meal a day and that also has minimal carbs ?  But I m not sure if she really counts her snack at all she tends to snack on dates and fruits which both can lead to elevated blood glucose   We had a lengthy discussion about these issues ---she is reliant on her  to help with diabetes care   we had a lengthy discussion about glycmeic goals and treatment options   Hypoglycemia protocol reviewed in detail with patient Patient was advised to carry glucose tablets and also have glucagon emergency kit. Provided with RX for glucagon.   -advised to have annual dilated eye exam uptodate pos retinopathy follows with Dr Wicho Velasquez   -last microalb/creat ratio elevated at >800 will recheck unable to calculate she was advised to follow with nephrology     foot exam --- she saw a podiatrist who gave her steroid shot she goes for regular follow up with her --    2. Hypothyroidism    Hypothyroidism TSH ) 0.01 >>0.9 >>2 > 1.2 >>2.3   she is on 125 mcg daily continue on same dose   ----she had thyroid hemiagensis left lobe absent   --She has been issues with swallowing recently had a CT scan of neck done which revealed normal thyroid normal submandibular and normal architecture. She was advised to see a ear nose and throat doctor but they were having issues with (according to the  the last  who was from the same country toward other community members about this patient's medical health)    3. Mixed hyperlipidemia  On statins   Last TG high and LDL was 115 in April 2018   Encouraged to work on      4. Hx of breast cancer  Follows with onc   On tamoxifen diet 3 modification which apparently will be stopped in 2019    5. Primary fibromyalgia  On Lyrica follows with Dr Talisha Lama     6. Malignant neoplasm of overlapping sites of left female breast, unspecified estrogen receptor status ([facility]  Breast cancer s/p surgery and chemo 2008   She is on tamoxifen which according to pt gives her pain in her arms and hence she takes percocet     7. Essential hypertension  Controlled now   Continue with current regimen    8.  Coronary artery disease involving native coronary artery of native heart without angina pectoris  Stable follows with Dr Stuart Perez     9. Lymphedema of upper extremity following lymphadenectomy  Stable     10. Dysthymia  Depression   She is on Latuda    Her dose has been adjusted   She appears to be alert and oriented to day but was very tearful    11. Asthma without complication  Stable    12. Diabetic polyneuropathy associated with type 2 diabetes mellitus ([facility]  Stable  She is on Lyrica and was on Cymbalta in the past       Rh Arthritis   She is on methotrexate and Arava  Plaquenil is also in her med list     ?chf /?CAN   Follows with cardiology continues to have multiple admission due to worsening of congestive heart failureshe is on demadex , sprinoloacto ne and metolazone         Reviewed and/or ordered clinical lab results Yes  Reviewed and/or ordered radiology tests Yes  Reviewed and/or ordered other diagnostic tests Yes  Made a decision to obtain old records Yes  Reviewed and summarized old records Yes    I spent more than 60  minutes with patient and more than 50 % of this time was spent discussing and providing counseling and coordinating care of patient's multiple health issues      Nikitabharatbutch Lopez was counseled regarding symptoms of current diagnosis, course and complications of disease if inadequately treated, side effects of medications, diagnosis, treatment options, and prognosis, risks, benefits, complications, and alternatives of treatment, labs, imaging and other studies and treatment targets and goals. She understands instructions and counseling. These diagnosis were discussed and reviewed with the patient including the advantages of drug therapy. She was counseled at this visit on the following: diabetes complication prevention and foot care. Return in about 3 months (around 8/7/2019).

## 2019-05-14 ENCOUNTER — TELEPHONE (OUTPATIENT)
Dept: CARDIOLOGY CLINIC | Age: 49
End: 2019-05-14

## 2019-05-14 ENCOUNTER — HOSPITAL ENCOUNTER (OUTPATIENT)
Age: 49
Setting detail: SPECIMEN
Discharge: HOME | End: 2019-05-14
Payer: COMMERCIAL

## 2019-05-14 LAB
ANION GAP SERPL CALCULATED.3IONS-SCNC: 13 MMOL/L (ref 3–16)
BUN BLDV-MCNC: 24 MG/DL (ref 7–20)
CALCIUM SERPL-MCNC: 10.6 MG/DL (ref 8.3–10.6)
CHLORIDE BLD-SCNC: 87 MMOL/L (ref 99–110)
CO2: 32 MMOL/L (ref 21–32)
CREAT SERPL-MCNC: 1.1 MG/DL (ref 0.6–1.1)
GFR AFRICAN AMERICAN: >60
GFR NON-AFRICAN AMERICAN: 53
GLUCOSE BLD-MCNC: 307 MG/DL (ref 70–99)
POTASSIUM SERPL-SCNC: 4.3 MMOL/L (ref 3.5–5.1)
PRO-BNP: 443 PG/ML (ref 0–124)
SODIUM BLD-SCNC: 132 MMOL/L (ref 136–145)

## 2019-05-14 PROCEDURE — 36415 COLL VENOUS BLD VENIPUNCTURE: CPT

## 2019-05-14 PROCEDURE — 80048 BASIC METABOLIC PNL TOTAL CA: CPT

## 2019-05-14 PROCEDURE — 83880 ASSAY OF NATRIURETIC PEPTIDE: CPT

## 2019-05-14 NOTE — TELEPHONE ENCOUNTER
Pt BP has been high the last 2 weeks. Last night it was 230/130 and she passed out briefly. Today BPis 178/113. They did not take her to ER because last time they had to sit for over 4 hrs only for the DR to tell them to f/u with ARETHA. Does she need to change her medication? Pls call to advise. Thank you.

## 2019-05-14 NOTE — TELEPHONE ENCOUNTER
I would consider giving her amlodipine 2.5 mg po qd for blood pressure, but would prefer she see RG tomorrow before increasing med. In the short term, she can take an extra metoprolol or one extra spironolactone.   ARETHA

## 2019-05-14 NOTE — TELEPHONE ENCOUNTER
called back asking to make an appt. Pt refused to go to hosp. [facility] had an 11:00 am available tomorrow 5/15/19 and has been scheduled. Asking if there is anything to help bring down B/P down. Please call to advise.

## 2019-05-15 ENCOUNTER — OFFICE VISIT (OUTPATIENT)
Dept: CARDIOLOGY CLINIC | Age: 49
End: 2019-05-15
Payer: COMMERCIAL

## 2019-05-15 ENCOUNTER — TELEPHONE (OUTPATIENT)
Dept: OTHER | Age: 49
End: 2019-05-15

## 2019-05-15 VITALS
HEART RATE: 96 BPM | OXYGEN SATURATION: 95 % | RESPIRATION RATE: 16 BRPM | BODY MASS INDEX: 31.41 KG/M2 | WEIGHT: 183 LBS | DIASTOLIC BLOOD PRESSURE: 64 MMHG | SYSTOLIC BLOOD PRESSURE: 124 MMHG

## 2019-05-15 DIAGNOSIS — G47.33 OSA (OBSTRUCTIVE SLEEP APNEA): ICD-10-CM

## 2019-05-15 DIAGNOSIS — I10 ESSENTIAL HYPERTENSION: ICD-10-CM

## 2019-05-15 DIAGNOSIS — I50.30 (HFPEF) HEART FAILURE WITH PRESERVED EJECTION FRACTION (HCC): Primary | ICD-10-CM

## 2019-05-15 DIAGNOSIS — E89.89 LYMPHEDEMA OF UPPER EXTREMITY FOLLOWING LYMPHADENECTOMY: ICD-10-CM

## 2019-05-15 DIAGNOSIS — I89.0 LYMPHEDEMA OF UPPER EXTREMITY FOLLOWING LYMPHADENECTOMY: ICD-10-CM

## 2019-05-15 DIAGNOSIS — I25.10 CORONARY ARTERY DISEASE INVOLVING NATIVE CORONARY ARTERY OF NATIVE HEART WITHOUT ANGINA PECTORIS: ICD-10-CM

## 2019-05-15 PROCEDURE — G8598 ASA/ANTIPLAT THER USED: HCPCS | Performed by: CLINICAL NURSE SPECIALIST

## 2019-05-15 PROCEDURE — G8427 DOCREV CUR MEDS BY ELIG CLIN: HCPCS | Performed by: CLINICAL NURSE SPECIALIST

## 2019-05-15 PROCEDURE — 3017F COLORECTAL CA SCREEN DOC REV: CPT | Performed by: CLINICAL NURSE SPECIALIST

## 2019-05-15 PROCEDURE — G8417 CALC BMI ABV UP PARAM F/U: HCPCS | Performed by: CLINICAL NURSE SPECIALIST

## 2019-05-15 PROCEDURE — 1036F TOBACCO NON-USER: CPT | Performed by: CLINICAL NURSE SPECIALIST

## 2019-05-15 PROCEDURE — 99214 OFFICE O/P EST MOD 30 MIN: CPT | Performed by: CLINICAL NURSE SPECIALIST

## 2019-05-15 RX ORDER — FEBUXOSTAT 40 MG/1
40 TABLET ORAL DAILY
Refills: 2 | COMMUNITY
Start: 2019-05-08 | End: 2020-03-25 | Stop reason: ALTCHOICE

## 2019-05-15 NOTE — PROGRESS NOTES
Ana Salgado  Progress Note    Primary Care Doctor:  [facility],     Chief Complaint   Patient presents with    Hypertension    Congestive Heart Failure     stabbing in upper left chest when BP high    Edema     hands, wearing support stockings    Shortness of Breath     laying down and with exertion   States wears O2 at home and this helps    Dizziness    Fatigue    Nausea     three days, no emesis        History of Present Illness:  chronic dCHF, CAD with cardiac cath 2014 at [facility] showing diffuse LAD disease and small vessel disease with normal RCA and LCX (treated medically). Breast cancer 8 years ago s/p mastectomy. LUIS with cpap non compliant    I had the pleasure of seeing Makenzie Rivera in follow up for urgent visit for hypertension. She is in a wheel chair with her  today. On Monday evening, she missed all her medications as they were at the [facility]. While at the [facility], she passed out briefly (did not hit head) and BP was extremely high 230/130. He states that there was a doctor there and she declined going to ER. She was nervous and that is why her BP was elevated. She complains of a headache for the past 3 days. Her weight is down 5 pounds. She did take an extra 50 mg of spironolactone yesterday as per Dr Cassius Prajapati suggestion. She does not have her support stockings on her arm/hands. BP today is completely normal and checked with 2 different cuffs. She did not bring her wrist monitor with her. Labs noted. She is on 3 L of oxygen at night.   She is sleepy and admits that this time is too early for her    Past Medical History:   has a past medical history of Anxiety, Anxiety and depression, Arthritis, Asthma, CAD (coronary artery disease), Cancer ([address] Cerebral artery occlusion with cerebral infarction ([address] CHF (congestive heart failure) ([address] Chronic kidney disease, Chronic pain, Constipation, COPD (chronic obstructive pulmonary disease) ([address] Depression, Diabetes mellitus ([facility] Diabetic polyneuropathy associated with type 2 diabetes mellitus ([facility] Dysthymia, ESBL (extended spectrum beta-lactamase) producing bacteria infection, Fibromyalgia, Gastric ulcer, unspecified as acute or chronic, without mention of hemorrhage, perforation, or obstruction, GERD (gastroesophageal reflux disease), Gout, HIGH CHOLESTEROL, Hypertension, Hypothyroidism, Severe persistent asthma without complication, Thyroid disease, and TIA (transient ischemic attack). Surgical History:   has a past surgical history that includes Breast surgery; Hysterectomy; Carpal tunnel release; Tonsillectomy; and Finger contracture surgery. Social History:   reports that she has never smoked. She has never used smokeless tobacco. She reports that she does not drink alcohol or use drugs. Family History:   Family History   Problem Relation Age of Onset    Asthma Other     Cancer Other     Depression Other     Diabetes Other     Hypertension Other     High Cholesterol Other     Migraines Other     Heart Attack Father 72         of MI    High Blood Pressure Mother     Diabetes Mother        Home Medications:  Prior to Admission medications    Medication Sig Start Date End Date Taking?  Authorizing Provider   ULORIC 40 MG TABS tablet Take 40 mg by mouth daily 19  Yes Historical Provider, MD   Blood Glucose Monitoring Suppl (FREESTYLE LITE) MINDY Test blood sugar QID 19  Yes Fernandez Huynh MD   FREESTYLE LITE strip Test blood sugar QID 19  Yes Fernandez Huynh MD   STOOL SOFTENER/LAXATIVE 50-8.6 MG per tablet TAKE 2 TABLETS BY MOUTH TWO TIMES A DAY  19  Yes Etta Escobar DO   torsemide (DEMADEX) 100 MG tablet TAKE 1 TABLET BY MOUTH IN THE MORNING AND ONE-HALF TABLET IN THE EVENING 19  Yes Brenton Goltz, MD   metolazone (ZAROXOLYN) 2.5 MG tablet TAKE 1 TABLET BY MOUTH ONE TIME A DAY AS NEEDED 19  Yes Brenton Goltz, MD   insulin aspart (NOVOLOG FLEXPEN) 100 UNIT/ML injection pen inject 30 units subcutaneously three times daily before meals 4/25/19  Yes Jenna Anderson MD   nitroGLYCERIN (NITROSTAT) 0.4 MG SL tablet Place 1 tablet under the tongue every 5 minutes as needed for Chest pain up to max of 3 total doses.  If no relief after 1 dose, call 911. 4/10/19  Yes Eran Jones MD   Linaclotide [facility]) 72 MCG CAPS Take 72 mcg by mouth daily 4/10/19  Yes Curt Michael DO   montelukast (SINGULAIR) 10 MG tablet TAKE 1 TABLET BY MOUTH ONE TIME A DAY  3/26/19  Yes MD IMANI Lim UF III MINI PEN NEEDLES 31G X 5 MM MISC use five times daily with insulin 3/26/19  Yes Jenna Anderson MD   levothyroxine (SYNTHROID) 150 MCG tablet Take 1 tablet by mouth every morning (before breakfast) 2/28/19  Yes MD IMANI Cantu UF III MINI PEN NEEDLES 31G X 5 MM MISC use five times daily with insulin 2/26/19  Yes Jenna Anderson MD   metoprolol tartrate (LOPRESSOR) 25 MG tablet Take 1 tablet by mouth 2 times daily 2/22/19  Yes Eran Jones MD   glipiZIDE (GLUCOTROL XL) 10 MG extended release tablet TAKE 1 TABLET BY MOUTH ONE TIME A DAY 2/12/19  Yes Jenna Anderson MD   Insulin Degludec 200 UNIT/ML SOPN Take 150 units daily 2/12/19  Yes Jenna Anderson MD   empagliflozin (JARDIANCE) 10 MG tablet Take 1 tablet by mouth daily 2/12/19  Yes Jenna Anderson MD   Continuous Blood Gluc  (FREESTYLE EMERALD READER) MINDY To monitor glucose 2/12/19  Yes Jenna Anderson MD   Continuous Blood Gluc Sensor (FREESTYLE EMERALD 14 DAY SENSOR) MISC Change every 14 days 2/12/19  Yes Jenna Anderson MD   ferrous sulfate 325 (65 Fe) MG tablet Take 1 tablet by mouth 2 times daily (with meals) 1/21/19  Yes Curt Michael DO   polyethylene glycol [facility]) powder Take 17 g by mouth nightly 1/21/19  Yes Curt Michael DO   omeprazole (PRILOSEC) 20 MG delayed release capsule Take 2 capsules by mouth 2 times daily For 2 months then resume 20 mg po bid  Patient taking differently: Take 20 mg by mouth 2 times daily 1/21/19  Yes Katy Spragueter, DO   loratadine (CLARITIN) 10 MG tablet Take 1 tablet by mouth daily 1/21/19  Yes Katy Spragueter, DO   colchicine (COLCRYS) 0.6 MG tablet Take 0.6 mg by mouth 2 times daily  1/8/19  Yes Historical Provider, MD   simvastatin (ZOCOR) 20 MG tablet Take 1 tablet by mouth nightly 9/7/18  Yes Lidia Morel MD   spironolactone (ALDACTONE) 50 MG tablet 100mg morning, 50mg evening  Patient taking differently: Take 200 mg by mouth daily 100mg morning, 50mg evening 5/30/18  Yes Lidia Morel MD   albuterol sulfate  (90 Base) MCG/ACT inhaler Inhale 2 puffs into the lungs every 6 hours as needed for Wheezing  Patient taking differently: Inhale 2 puffs into the lungs 2 times daily  4/16/18  Yes Selena Lai MD   OXYGEN Inhale 3 L into the lungs nightly Sometimes with activity   Yes Historical Provider, MD   oxyCODONE (OXYCONTIN) 20 MG extended release tablet Take 20 mg by mouth every 12 hours.    Yes Historical Provider, MD   aspirin 81 MG EC tablet Take 81 mg by mouth daily   Yes Historical Provider, MD   sulfaSALAzine (AZULFIDINE) 500 MG tablet Take 500 mg by mouth 2 times daily   Yes Historical Provider, MD   calcium carbonate-vitamin D (CALCIUM 600+D) 600-200 MG-UNIT TABS Take 1 tablet by mouth 2 times daily   Yes Historical Provider, MD   benztropine (COGENTIN) 1 MG tablet Take 1 mg by mouth nightly    Yes Historical Provider, MD   Cholecalciferol (VITAMIN D) 2000 units CAPS capsule Take 1 capsule by mouth daily (with breakfast)   Yes Historical Provider, MD   folic acid (FOLVITE) 1 MG tablet Take 1 mg by mouth daily   Yes Historical Provider, MD   lurasidone (LATUDA) 40 MG TABS tablet Take 40 mg by mouth Daily with supper    Yes Historical Provider, MD   leflunomide (ARAVA) 20 MG tablet Take 20 mg by mouth daily   Yes Historical Provider, MD   albuterol (ACCUNEB) 1.25 MG/3ML nebulizer solution Inhale 1 ampule into the lungs every 6 hours as needed for Wheezing   Yes Historical Provider, MD   mometasone-formoterol [facility]) 200-5 MCG/ACT inhaler Inhale 2 puffs into the lungs every 12 hours   Yes Historical Provider, MD   fluticasone (FLONASE) 50 MCG/ACT nasal spray 1 spray by Nasal route daily  Patient taking differently: 1 spray by Nasal route daily  10/17/17  Yes Mirela Valdez MD   oxyCODONE-acetaminophen (PERCOCET)  MG per tablet Take 1 tablet by mouth every 4 hours as needed for Pain . Earliest Fill Date: 6/8/17 6/8/17  Yes Noé Puckett, APRN - CNP   Elastic Bandages & Supports (FUTURO SUPPORT GLOVE MEDIUM) [address] [facility] 1 ready made support glove 5/19/16  Yes Jagdish Cline MD   Compression Sleeve MISC by Does not apply route 1 sleeve, 20-30 mmHg 5/19/16  Yes Jagdish Cline MD   ALPRAZolam [facility] [place]) 1 MG tablet Take 1 mg by mouth 2 times daily . Yes Historical Provider, MD   LYRICA 50 MG capsule TAKE 1 CAPSULE BY MOUTH TWICE DAILY  Patient taking differently: 1 tab BID 2/9/13  Yes Seun Quiñones MD   duloxetine (CYMBALTA) 60 MG capsule Take 60 mg by mouth 2 times daily. Yes Historical Provider, MD        Allergies: Iv dye [iodides]; Bentley Durango [insulin glargine]; and Trazodone and nefazodone     Review of Systems:   · Constitutional: there has been no unanticipated weight loss. There's been no change in energy level, sleep pattern, or activity level. Sob, dizziness, fatigue   · Eyes: No visual changes or diplopia. No scleral icterus. · ENT: No Headaches, hearing loss or vertigo. No mouth sores or sore throat. · Cardiovascular: Reviewed in HPI  · Respiratory: No cough or wheezing, no sputum production. No hematemesis. · Gastrointestinal: No abdominal pain, appetite loss, blood in stools. No change in bowel or bladder habits. · Genitourinary: No dysuria, trouble voiding, or hematuria. · Musculoskeletal:  No gait disturbance, weakness or joint complaints. edema hand  · Integumentary: No rash or pruritis.   · Neurological: No headache, diplopia, change in muscle strength, numbness or tingling. No change in gait, balance, coordination, mood, affect, memory, mentation, behavior. · Psychiatric: No anxiety, no depression. · Endocrine: No malaise, fatigue or temperature intolerance. No excessive thirst, fluid intake, or urination. No tremor. · Hematologic/Lymphatic: No abnormal bruising or bleeding, blood clots or swollen lymph nodes. · Allergic/Immunologic: No nasal congestion or hives. Physical Examination:    Vitals:    05/15/19 1107 05/15/19 1120   BP: 129/83 125/83   Site: Left Upper Arm Right Upper Arm   Position: Sitting Sitting   Cuff Size: Large Adult Large Adult   Pulse: 97 96   Resp: 16    SpO2: 95%    Weight: 183 lb (83 kg)         Constitutional and General Appearance: Warm and dry, no apparent distress, normal coloration  HEENT:  Normocephalic, atraumatic  Respiratory:  · Normal excursion and expansion without use of accessory muscles  · Resp Auscultation: Normal breath sounds without dullness  Cardiovascular:  · The apical impulses not displaced  · Heart tones are crisp and normal  · JVP normal cm H2O  · Regular rate and rhythm  · Peripheral pulses are symmetrical and full  · There is no clubbing, cyanosis of the extremities.   · No ankle edema, bilateral hand edema (lymph) no support stockings on hands  · Pedal Pulses: 2+ and equal   Abdomen:  · No masses or tenderness  · Liver/Spleen: No Abnormalities Noted  Neurological/Psychiatric:  · Alert and oriented in all spheres  · Moves all extremities well  · Exhibits normal gait balance and coordination  · No abnormalities of mood, affect, memory, mentation, or behavior are noted    Lab Data:    CBC:   Lab Results   Component Value Date    WBC 6.7 02/18/2019    WBC 6.6 01/17/2019    WBC 8.9 09/11/2018    RBC 3.83 02/18/2019    RBC 3.97 01/17/2019    RBC 4.31 09/11/2018    RBC 3.57 05/16/2017    RBC 3.99 02/07/2017    RBC 3.70 11/08/2016    HGB 11.1 02/18/2019    HGB 11.2 01/17/2019    HGB 12.3 09/11/2018    HCT 34.0 02/18/2019    HCT 34.8 01/17/2019    HCT 37.7 09/11/2018    MCV 88.8 02/18/2019    MCV 87.7 01/17/2019    MCV 87.5 09/11/2018    RDW 14.0 02/18/2019    RDW 13.1 01/17/2019    RDW 13.2 09/11/2018     02/18/2019     01/17/2019     09/11/2018     BMP:  Lab Results   Component Value Date     05/14/2019     04/29/2019     04/15/2019    K 4.3 05/14/2019    K 3.8 04/29/2019    K 4.3 04/15/2019    K 3.8 09/11/2018    K 4.3 07/19/2018    K 4.0 03/16/2018    CL 87 05/14/2019    CL 96 04/29/2019    CL 94 04/15/2019    CO2 32 05/14/2019    CO2 27 04/29/2019    CO2 30 04/15/2019    PHOS 3.4 02/07/2019    PHOS 3.1 08/11/2018    PHOS 3.2 03/25/2018    BUN 24 05/14/2019    BUN 39 04/29/2019    BUN 30 04/15/2019    CREATININE 1.1 05/14/2019    CREATININE 1.1 04/29/2019    CREATININE 1.1 04/15/2019     BNP:   Lab Results   Component Value Date    PROBNP 443 05/14/2019    PROBNP 52 04/29/2019    PROBNP 168 04/15/2019     Cardiac Imaging:    Left Heart Cath 2/27/18  Dominance : Right     LM: bifurcating, MLI  LAD: mild plaquing with small vessel disease distally   LCx: no significant disease  RCA: MLI     LVEDP: 25 mmHg   No Ao gradient     Right Heart Cath   RA: 13  RV: 47/6/16  PA:   46/19    and mean of 32  PCWP: 21 mmHg      Sats:  Ao: 92%  RA: 61%  PA: 62% (x 2)     CO/CI : 6.1 L/min (TD)     cxr 1/15/18  No acute cardiopulmonary disease     Echo 11/8/2017  Normal left ventricle size and systolic function with an estimated ejection   fraction of 60%.  No regional wall motion abnormalities are seen.  Doreene Suzi is borderline concentric left ventricular hypertrophy.   E/e\"= 13     Stress test 11/8/2017  There is normal isotope uptake at stress and rest. There is no evidence of  myocardial ischemia or scar.  Normal LV function.  Overall findings represent a low risk scan.       VQ scan negative 1/11/2018     Cardiac MRI 5/12/38034  this cardiac MRI shows a non-dilated left ventricle with normal to PCP at close of office visit  4. CAD patient on anti-platelet: na  5. CAD patient on STATIN therapy:  yes  6.  Patient with CHF and aFib on anticoagulation:  NA

## 2019-05-15 NOTE — PATIENT INSTRUCTIONS
1.  Call Dr Joselin Larson regarding headache  2. Needs better control of blood sugars  3. [facility]  4.   RTO with Dr Newberry June 5 th

## 2019-05-15 NOTE — TELEPHONE ENCOUNTER
Sp[facility] with [facility] Griggs [facility] asking them to correlate their blood pressure cuff to the patients blood pressure cuff. Also asked to make sure patients cuff is the right size large as used here in office.

## 2019-05-21 ENCOUNTER — HOSPITAL ENCOUNTER (OUTPATIENT)
Dept: CT IMAGING | Age: 49
Discharge: HOME | End: 2019-05-21
Payer: COMMERCIAL

## 2019-05-21 ENCOUNTER — OFFICE VISIT (OUTPATIENT)
Dept: INTERNAL MEDICINE CLINIC | Age: 49
End: 2019-05-21
Payer: COMMERCIAL

## 2019-05-21 VITALS
SYSTOLIC BLOOD PRESSURE: 100 MMHG | OXYGEN SATURATION: 100 % | HEART RATE: 96 BPM | DIASTOLIC BLOOD PRESSURE: 70 MMHG | BODY MASS INDEX: 31.96 KG/M2 | WEIGHT: 186.2 LBS

## 2019-05-21 DIAGNOSIS — K59.03 CONSTIPATION DUE TO PAIN MEDICATION: ICD-10-CM

## 2019-05-21 DIAGNOSIS — R53.83 FATIGUE, UNSPECIFIED TYPE: ICD-10-CM

## 2019-05-21 DIAGNOSIS — R13.12 OROPHARYNGEAL DYSPHAGIA: ICD-10-CM

## 2019-05-21 DIAGNOSIS — R42 VERTIGO: Primary | ICD-10-CM

## 2019-05-21 DIAGNOSIS — I16.0 HYPERTENSIVE URGENCY: ICD-10-CM

## 2019-05-21 DIAGNOSIS — R51.9 ACUTE INTRACTABLE HEADACHE, UNSPECIFIED HEADACHE TYPE: ICD-10-CM

## 2019-05-21 DIAGNOSIS — R68.84 JAW PAIN: ICD-10-CM

## 2019-05-21 DIAGNOSIS — G44.52 NEW PERSISTENT DAILY HEADACHE: ICD-10-CM

## 2019-05-21 DIAGNOSIS — R42 VERTIGO: ICD-10-CM

## 2019-05-21 DIAGNOSIS — I10 ESSENTIAL HYPERTENSION: Primary | Chronic | ICD-10-CM

## 2019-05-21 PROCEDURE — 1036F TOBACCO NON-USER: CPT | Performed by: INTERNAL MEDICINE

## 2019-05-21 PROCEDURE — G8417 CALC BMI ABV UP PARAM F/U: HCPCS | Performed by: INTERNAL MEDICINE

## 2019-05-21 PROCEDURE — 3017F COLORECTAL CA SCREEN DOC REV: CPT | Performed by: INTERNAL MEDICINE

## 2019-05-21 PROCEDURE — 70450 CT HEAD/BRAIN W/O DYE: CPT

## 2019-05-21 PROCEDURE — G8427 DOCREV CUR MEDS BY ELIG CLIN: HCPCS | Performed by: INTERNAL MEDICINE

## 2019-05-21 PROCEDURE — 99214 OFFICE O/P EST MOD 30 MIN: CPT | Performed by: INTERNAL MEDICINE

## 2019-05-21 PROCEDURE — G8598 ASA/ANTIPLAT THER USED: HCPCS | Performed by: INTERNAL MEDICINE

## 2019-05-21 NOTE — PROGRESS NOTES
Patient: Kathryn Wells is a 47 y.o. female who presents today with the following Chief Complaint(s):  No chief complaint on file. HPI     Patient's primary language is Arebic. Interview is conducted without certified Wendy Zarate  per her choice and relies on  for translation. Here today for acute visit. Had a syncopal episode at her masque last week, BP was 220/120- had forgotten to take her medication. Had a HA, slept x 48 hours. Did call her cardiologist and BP medication was increased spirolactone by 25 mg. Is here today d/t headache. HA- b/l frontal, feels like severe pressure in her head. Is as bad as it did on Thursday. HA has been intermittent in the past but has been continuous since Thursday. HA has not increased or decreased in intensity. Feels a pulsating in her ears. Also is c/o associated vertigo since Thursday, is getting worse since onset. Dizziness if made worse by movement. Needs her  to help her get up. Is unsteady. Was intermittently dizzy prior to Thursday, is worse since Thursday. Has had changes in her hearing- sometimes decreased and sometimes intensified and does have tinnitus. Does not notice this more in 1 ear vs the other. Also is very nauseated. Has not tried to take anything for her headache. Refused to go to ED on Thursday. Did have weakness in her left leg after her fall. Lasted several hours. Seemed to still be present the following day. Has since resolved. Did have CP with elevated BP. Since Thursday, has had increased HA, confusion, and increased sleepiness. Sugars have been irregular- fluctuating widely. Dr. Yasmine Madrigal did try to give her a CGM but insurance would not cover it. Still having significant constipation. Has GI appointment in July (7/18/19). Insurance has denied Linzess and Amitiza. Amitiza works well for her.      Has ENT appointment scheduled 18352 for swallowing/throat issues (will be seeing someone [facility] ENT sulfate  (90 Base) MCG/ACT inhaler Inhale 2 puffs into the lungs every 6 hours as needed for Wheezing (Patient taking differently: Inhale 2 puffs into the lungs 2 times daily ) 1 Inhaler 6    OXYGEN Inhale 3 L into the lungs nightly Sometimes with activity      oxyCODONE (OXYCONTIN) 20 MG extended release tablet Take 20 mg by mouth every 12 hours.  aspirin 81 MG EC tablet Take 81 mg by mouth daily      sulfaSALAzine (AZULFIDINE) 500 MG tablet Take 500 mg by mouth 2 times daily      calcium carbonate-vitamin D (CALCIUM 600+D) 600-200 MG-UNIT TABS Take 1 tablet by mouth 2 times daily      benztropine (COGENTIN) 1 MG tablet Take 1 mg by mouth nightly       Cholecalciferol (VITAMIN D) 2000 units CAPS capsule Take 1 capsule by mouth daily (with breakfast)      folic acid (FOLVITE) 1 MG tablet Take 1 mg by mouth daily      lurasidone (LATUDA) 40 MG TABS tablet Take 40 mg by mouth Daily with supper       leflunomide (ARAVA) 20 MG tablet Take 20 mg by mouth daily      albuterol (ACCUNEB) 1.25 MG/3ML nebulizer solution Inhale 1 ampule into the lungs every 6 hours as needed for Wheezing      mometasone-formoterol (DULERA) 200-5 MCG/ACT inhaler Inhale 2 puffs into the lungs every 12 hours      oxyCODONE-acetaminophen (PERCOCET)  MG per tablet Take 1 tablet by mouth every 4 hours as needed for Pain . Earliest Fill Date: 6/8/17 100 tablet 0    Elastic Bandages & Supports (FUTURO SUPPORT GLOVE MEDIUM) MISC 1 ready made support glove 1 each 2    Compression Sleeve MISC by Does not apply route 1 sleeve, 20-30 mmHg 1 each 2    ALPRAZolam (XANAX) 1 MG tablet Take 1 mg by mouth 2 times daily .  LYRICA 50 MG capsule TAKE 1 CAPSULE BY MOUTH TWICE DAILY (Patient taking differently: 1 tab BID) 60 capsule 0    duloxetine (CYMBALTA) 60 MG capsule Take 60 mg by mouth 2 times daily.         nitroGLYCERIN (NITROSTAT) 0.4 MG SL tablet Place 1 tablet under the tongue every 5 minutes as needed for Chest pain up to max of 3 total doses. If no relief after 1 dose, call 911. 25 tablet 1    colchicine (COLCRYS) 0.6 MG tablet Take 0.6 mg by mouth 2 times daily       fluticasone (FLONASE) 50 MCG/ACT nasal spray 1 spray by Nasal route daily (Patient taking differently: 1 spray by Nasal route daily ) 1 Bottle 0     No facility-administered medications prior to visit. Patient'spast medical history, surgical history, family history, medications,  and allergies  were all reviewed and updated as appropriate today. Review of Systems   Constitutional: Positive for fatigue. HENT: Positive for trouble swallowing. Respiratory: Positive for shortness of breath. Negative for cough, choking and chest tightness. Gastrointestinal: Positive for constipation. Negative for diarrhea. Genitourinary: Negative for difficulty urinating. /70   Pulse 96   Wt 186 lb 3.2 oz (84.5 kg)   SpO2 100%   BMI 31.96 kg/m²   Physical Exam   Constitutional: She appears well-developed and well-nourished. She appears listless. She is cooperative. Non-toxic appearance. She does not have a sickly appearance. She appears ill (chronically ill). No distress. HENT:   Head: Normocephalic. Right Ear: Tympanic membrane, external ear and ear canal normal.   Left Ear: Tympanic membrane, external ear and ear canal normal.   Nose: Nose normal.   Mouth/Throat: Oropharynx is clear and moist and mucous membranes are normal.   Neck: Carotid bruit is not present. No thyroid mass and no thyromegaly present. Cardiovascular: Normal rate, regular rhythm, normal heart sounds and intact distal pulses. No murmur heard. Pulses:       Dorsalis pedis pulses are 2+ on the right side, and 2+ on the left side. No LE edema   Pulmonary/Chest: Effort normal and breath sounds normal.   Lymphadenopathy:     She has no cervical adenopathy. Neurological: She appears listless. No cranial nerve deficit.  Coordination normal. ASSESSMENT/PLAN:    Problem List Items Addressed This Visit     Acute intractable headache     Following hypertensive urgency with BP of 220/210 last week. Check stat CT of head to r/o hemorrhagic stroke. If CT is positive, will go to ED; if CT negative, will check MRI of head. Relevant Orders    CT HEAD WO CONTRAST (Completed)    CREATININE, SERUM    Constipation due to pain medication     Insurance has denies Linzess and Michael. Has appointment with GI coming up July. Essential hypertension - Primary (Chronic)     Had episode of hypertensive urgency last week. Did call cardiology and increased Aldactone from 25  -> 50 mg qd. BP is excellent today. Continue Aldactone 50 mg qd, Lopressor 25 mg BID, and Demadex 100 mg qd. Relevant Orders    CREATININE, SERUM    Fatigue     Worse since episode of hypertensive urgency. Check stat CT of head to r/o hemorrhagic stroke. May also be related to now- lower BP. Hypertensive urgency     Last week with associated syncope, now with persistent HA and fatigue. Check stat CT of head to r/o hemorrhagic stroke. Advised to go to ED should this happen again (declined ED visit at the time). Relevant Orders    CT HEAD WO CONTRAST (Completed)    Jaw pain     No clear etiology. Has appointment with ENT later this week. Has also been advised to d/w her dentist.          Oropharyngeal dysphagia     Has appointment with ENT later this week. CT neck was unremarkable. Vertigo     Of concern is this has worsened acutely after an episode of elevated BP up to 220/120. Check CT head (stat) to r/o bleed. If CT of head is negative, check MRI. She also has an appointment with ENT later this week.           Relevant Orders    CT HEAD WO CONTRAST (Completed)    CREATININE, SERUM          Current Outpatient Medications   Medication Sig Dispense Refill    ULORIC 40 MG TABS tablet Take 40 mg by mouth daily  2    Blood Glucose Monitoring Suppl by mouth 2 times daily ) 60 capsule 3    loratadine (CLARITIN) 10 MG tablet Take 1 tablet by mouth daily 90 tablet 1    simvastatin (ZOCOR) 20 MG tablet Take 1 tablet by mouth nightly 90 tablet 3    spironolactone (ALDACTONE) 50 MG tablet 100mg morning, 50mg evening (Patient taking differently: Take 200 mg by mouth daily 100mg morning, 50mg evening) 270 tablet 3    albuterol sulfate  (90 Base) MCG/ACT inhaler Inhale 2 puffs into the lungs every 6 hours as needed for Wheezing (Patient taking differently: Inhale 2 puffs into the lungs 2 times daily ) 1 Inhaler 6    OXYGEN Inhale 3 L into the lungs nightly Sometimes with activity      oxyCODONE (OXYCONTIN) 20 MG extended release tablet Take 20 mg by mouth every 12 hours.  aspirin 81 MG EC tablet Take 81 mg by mouth daily      sulfaSALAzine (AZULFIDINE) 500 MG tablet Take 500 mg by mouth 2 times daily      calcium carbonate-vitamin D (CALCIUM 600+D) 600-200 MG-UNIT TABS Take 1 tablet by mouth 2 times daily      benztropine (COGENTIN) 1 MG tablet Take 1 mg by mouth nightly       Cholecalciferol (VITAMIN D) 2000 units CAPS capsule Take 1 capsule by mouth daily (with breakfast)      folic acid (FOLVITE) 1 MG tablet Take 1 mg by mouth daily      lurasidone (LATUDA) 40 MG TABS tablet Take 40 mg by mouth Daily with supper       leflunomide (ARAVA) 20 MG tablet Take 20 mg by mouth daily      albuterol (ACCUNEB) 1.25 MG/3ML nebulizer solution Inhale 1 ampule into the lungs every 6 hours as needed for Wheezing      mometasone-formoterol (DULERA) 200-5 MCG/ACT inhaler Inhale 2 puffs into the lungs every 12 hours      oxyCODONE-acetaminophen (PERCOCET)  MG per tablet Take 1 tablet by mouth every 4 hours as needed for Pain .  Earliest Fill Date: 6/8/17 100 tablet 0    Elastic Bandages & Supports (FUTURO SUPPORT GLOVE MEDIUM) MISC 1 ready made support glove 1 each 2    Compression Sleeve MISC by Does not apply route 1 sleeve, 20-30 mmHg 1 each 2  ALPRAZolam (XANAX) 1 MG tablet Take 1 mg by mouth 2 times daily .  LYRICA 50 MG capsule TAKE 1 CAPSULE BY MOUTH TWICE DAILY (Patient taking differently: 1 tab BID) 60 capsule 0    duloxetine (CYMBALTA) 60 MG capsule Take 60 mg by mouth 2 times daily.  nitroGLYCERIN (NITROSTAT) 0.4 MG SL tablet Place 1 tablet under the tongue every 5 minutes as needed for Chest pain up to max of 3 total doses. If no relief after 1 dose, call 911. 25 tablet 1    colchicine (COLCRYS) 0.6 MG tablet Take 0.6 mg by mouth 2 times daily       fluticasone (FLONASE) 50 MCG/ACT nasal spray 1 spray by Nasal route daily (Patient taking differently: 1 spray by Nasal route daily ) 1 Bottle 0     No current facility-administered medications for this visit. No follow-ups on file.

## 2019-05-25 DIAGNOSIS — I10 ESSENTIAL HYPERTENSION: ICD-10-CM

## 2019-05-25 DIAGNOSIS — I25.10 CORONARY ARTERY DISEASE INVOLVING NATIVE CORONARY ARTERY OF NATIVE HEART WITHOUT ANGINA PECTORIS: Chronic | ICD-10-CM

## 2019-05-25 DIAGNOSIS — I50.22 SYSTOLIC CHF, CHRONIC (HCC): ICD-10-CM

## 2019-05-25 DIAGNOSIS — I50.30 (HFPEF) HEART FAILURE WITH PRESERVED EJECTION FRACTION (HCC): Chronic | ICD-10-CM

## 2019-05-25 DIAGNOSIS — N28.9 RENAL INSUFFICIENCY: Chronic | ICD-10-CM

## 2019-05-25 DIAGNOSIS — R06.02 SOB (SHORTNESS OF BREATH): Chronic | ICD-10-CM

## 2019-05-25 DIAGNOSIS — K59.03 CONSTIPATION DUE TO PAIN MEDICATION: ICD-10-CM

## 2019-05-26 PROBLEM — R53.83 FATIGUE: Status: ACTIVE | Noted: 2019-05-26

## 2019-05-26 PROBLEM — I16.0 HYPERTENSIVE URGENCY: Status: ACTIVE | Noted: 2019-05-26

## 2019-05-26 PROBLEM — R42 VERTIGO: Status: ACTIVE | Noted: 2019-05-26

## 2019-05-26 ASSESSMENT — ENCOUNTER SYMPTOMS
COUGH: 0
CONSTIPATION: 1
DIARRHEA: 0
CHOKING: 0
SHORTNESS OF BREATH: 1
CHEST TIGHTNESS: 0
TROUBLE SWALLOWING: 1

## 2019-05-26 NOTE — ASSESSMENT & PLAN NOTE
Last week with associated syncope, now with persistent HA and fatigue. Check stat CT of head to r/o hemorrhagic stroke. Advised to go to ED should this happen again (declined ED visit at the time).

## 2019-05-26 NOTE — ASSESSMENT & PLAN NOTE
Following hypertensive urgency with BP of 220/210 last week. Check stat CT of head to r/o hemorrhagic stroke. If CT is positive, will go to ED; if CT negative, will check MRI of head.

## 2019-05-26 NOTE — ASSESSMENT & PLAN NOTE
Of concern is this has worsened acutely after an episode of elevated BP up to 220/120. Check CT head (stat) to r/o bleed. If CT of head is negative, check MRI. She also has an appointment with ENT later this week.

## 2019-05-26 NOTE — ASSESSMENT & PLAN NOTE
Had episode of hypertensive urgency last week. Did call cardiology and increased Aldactone from 25  -> 50 mg qd. BP is excellent today. Continue Aldactone 50 mg qd, Lopressor 25 mg BID, and Demadex 100 mg qd.

## 2019-05-26 NOTE — ASSESSMENT & PLAN NOTE
No clear etiology. Has appointment with ENT later this week.  Has also been advised to d/w her dentist.

## 2019-05-28 RX ORDER — METOLAZONE 2.5 MG/1
2.5 TABLET ORAL
Qty: 60 TABLET | Refills: 2 | Status: SHIPPED | OUTPATIENT
Start: 2019-05-30 | End: 2019-06-18 | Stop reason: DRUGHIGH

## 2019-05-28 RX ORDER — DOCUSATE SODIUM -SENNOSIDES 50; 8.6 MG/1; MG/1
TABLET, COATED ORAL
Qty: 120 TABLET | Refills: 1 | Status: SHIPPED | OUTPATIENT
Start: 2019-05-28 | End: 2019-07-20 | Stop reason: SDUPTHER

## 2019-05-29 ENCOUNTER — TELEPHONE (OUTPATIENT)
Dept: RHEUMATOLOGY | Age: 49
End: 2019-05-29

## 2019-05-29 ENCOUNTER — TELEPHONE (OUTPATIENT)
Dept: CARDIOLOGY CLINIC | Age: 49
End: 2019-05-29

## 2019-05-29 RX ORDER — BLOOD PRESSURE TEST KIT
KIT MISCELLANEOUS
Qty: 1 KIT | Refills: 0 | Status: SHIPPED | OUTPATIENT
Start: 2019-05-29 | End: 2020-03-25 | Stop reason: ALTCHOICE

## 2019-05-30 ENCOUNTER — HOSPITAL ENCOUNTER (OUTPATIENT)
Age: 49
Setting detail: SPECIMEN
Discharge: HOME | End: 2019-05-30
Payer: COMMERCIAL

## 2019-05-30 LAB
ANION GAP SERPL CALCULATED.3IONS-SCNC: 13 MMOL/L (ref 3–16)
BUN BLDV-MCNC: 26 MG/DL (ref 7–20)
CALCIUM SERPL-MCNC: 9.9 MG/DL (ref 8.3–10.6)
CHLORIDE BLD-SCNC: 92 MMOL/L (ref 99–110)
CO2: 33 MMOL/L (ref 21–32)
CREAT SERPL-MCNC: 1.1 MG/DL (ref 0.6–1.1)
GFR AFRICAN AMERICAN: >60
GFR NON-AFRICAN AMERICAN: 53
GLUCOSE BLD-MCNC: 153 MG/DL (ref 70–99)
PARATHYROID HORMONE INTACT: 89 PG/ML (ref 14–72)
POTASSIUM SERPL-SCNC: 4 MMOL/L (ref 3.5–5.1)
PRO-BNP: 73 PG/ML (ref 0–124)
SEDIMENTATION RATE, ERYTHROCYTE: 81 MM/HR (ref 0–30)
SODIUM BLD-SCNC: 138 MMOL/L (ref 136–145)
URIC ACID, SERUM: 6 MG/DL (ref 2.6–6)

## 2019-05-30 PROCEDURE — 84439 ASSAY OF FREE THYROXINE: CPT

## 2019-05-30 PROCEDURE — 84550 ASSAY OF BLOOD/URIC ACID: CPT

## 2019-05-30 PROCEDURE — 82746 ASSAY OF FOLIC ACID SERUM: CPT

## 2019-05-30 PROCEDURE — 83970 ASSAY OF PARATHORMONE: CPT

## 2019-05-30 PROCEDURE — 84443 ASSAY THYROID STIM HORMONE: CPT

## 2019-05-30 PROCEDURE — 86812 HLA TYPING A B OR C: CPT

## 2019-05-30 PROCEDURE — 86140 C-REACTIVE PROTEIN: CPT

## 2019-05-30 PROCEDURE — 82306 VITAMIN D 25 HYDROXY: CPT

## 2019-05-30 PROCEDURE — 84165 PROTEIN E-PHORESIS SERUM: CPT

## 2019-05-30 PROCEDURE — 85652 RBC SED RATE AUTOMATED: CPT

## 2019-05-30 PROCEDURE — 82607 VITAMIN B-12: CPT

## 2019-05-30 PROCEDURE — 36415 COLL VENOUS BLD VENIPUNCTURE: CPT

## 2019-05-30 PROCEDURE — 83880 ASSAY OF NATRIURETIC PEPTIDE: CPT

## 2019-05-30 PROCEDURE — 80048 BASIC METABOLIC PNL TOTAL CA: CPT

## 2019-05-30 PROCEDURE — 84155 ASSAY OF PROTEIN SERUM: CPT

## 2019-05-30 NOTE — TELEPHONE ENCOUNTER
Received DENIAL for Linzess 72MCG capsules. Denial letter attached. Please advise patient. Thank you.

## 2019-05-31 DIAGNOSIS — K44.9 HIATAL HERNIA WITH GERD: ICD-10-CM

## 2019-05-31 DIAGNOSIS — K59.03 DRUG-INDUCED CONSTIPATION: Primary | ICD-10-CM

## 2019-05-31 DIAGNOSIS — K21.9 HIATAL HERNIA WITH GERD: ICD-10-CM

## 2019-05-31 LAB
ALBUMIN SERPL-MCNC: 3.1 G/DL (ref 3.1–4.9)
ALPHA-1-GLOBULIN: 0.2 G/DL (ref 0.2–0.4)
ALPHA-2-GLOBULIN: 1.2 G/DL (ref 0.4–1.1)
BETA GLOBULIN: 1.3 G/DL (ref 0.9–1.6)
C-REACTIVE PROTEIN: 15.6 MG/L (ref 0–5.1)
FOLATE: >20 NG/ML (ref 4.78–24.2)
GAMMA GLOBULIN: 1.5 G/DL (ref 0.6–1.8)
SPE/IFE INTERPRETATION: NORMAL
T4 FREE: 1 NG/DL (ref 0.9–1.8)
TOTAL PROTEIN: 7.2 G/DL (ref 6.4–8.2)
TSH REFLEX: 0.91 UIU/ML (ref 0.27–4.2)
VITAMIN B-12: 514 PG/ML (ref 211–911)
VITAMIN D 25-HYDROXY: 46.6 NG/ML

## 2019-05-31 RX ORDER — OMEPRAZOLE 20 MG/1
20 CAPSULE, DELAYED RELEASE ORAL 2 TIMES DAILY
Qty: 60 CAPSULE | Refills: 5 | Status: SHIPPED | OUTPATIENT
Start: 2019-05-31 | End: 2019-11-21 | Stop reason: SDUPTHER

## 2019-05-31 NOTE — TELEPHONE ENCOUNTER
[facility] We can try Trulance, which she must try for 90 days prior to them paying for anything else. I will send in rx for Trulance.  Thanks saw

## 2019-06-01 LAB — HLA B27: NEGATIVE

## 2019-06-03 ENCOUNTER — TELEPHONE (OUTPATIENT)
Dept: RHEUMATOLOGY | Age: 49
End: 2019-06-03

## 2019-06-03 NOTE — TELEPHONE ENCOUNTER
PA submitted for Trulance 3 mg Tablets on [facility]  Key: [id number] - PA Case ID: [id number] - Rx #: [id number]    Pending review.

## 2019-06-05 ENCOUNTER — TELEPHONE (OUTPATIENT)
Dept: CARDIOLOGY CLINIC | Age: 49
End: 2019-06-05

## 2019-06-05 NOTE — TELEPHONE ENCOUNTER
Called and spoke with spouse who would not inform me if patient was available or not. He stated that I could speak with him. language barrier. I informed him of message below he stated that he has not idea and nor does the patient and hung up.

## 2019-06-05 NOTE — TELEPHONE ENCOUNTER
----- Message from Eran Jones MD sent at 6/4/2019  8:24 PM EDT -----  Call pt. Her labs look okay (the ones that I ordered). I don't know who ordered all of the other labs under my name or who these labs should go to. Please find out from patient so that the labs can be forwarded on to appropriate lphysician.   ARETHA

## 2019-06-06 ENCOUNTER — TELEPHONE (OUTPATIENT)
Dept: CARDIOLOGY CLINIC | Age: 49
End: 2019-06-06

## 2019-06-06 NOTE — TELEPHONE ENCOUNTER
----- Message from Jodie Purcell sent at 6/5/2019  4:52 PM EDT -----      ----- Message -----  From: Nam Kincaid MD  Sent: 6/4/2019   8:24 PM  To: [facility] pt. Her labs look okay (the ones that I ordered). I don't know who ordered all of the other labs under my name or who these labs should go to. Please find out from patient so that the labs can be forwarded on to appropriate lphysician.   ARETHA

## 2019-06-06 NOTE — TELEPHONE ENCOUNTER
Notes recorded by Chayito Saleh MD on 6/4/2019 at 8:25 PM EDT  Elli Meraz, did you order some of these labs?  Like CRP etc?  Just not sure who wanted them ordered or who to send them to. Anjum Hermosillo! Eli

## 2019-06-11 ENCOUNTER — HOSPITAL ENCOUNTER (OUTPATIENT)
Age: 49
Setting detail: SPECIMEN
Discharge: HOME | End: 2019-06-11
Payer: COMMERCIAL

## 2019-06-11 LAB
ANION GAP SERPL CALCULATED.3IONS-SCNC: 14 MMOL/L (ref 3–16)
BUN BLDV-MCNC: 40 MG/DL (ref 7–20)
CALCIUM SERPL-MCNC: 9.6 MG/DL (ref 8.3–10.6)
CHLORIDE BLD-SCNC: 95 MMOL/L (ref 99–110)
CO2: 26 MMOL/L (ref 21–32)
CREAT SERPL-MCNC: 1.2 MG/DL (ref 0.6–1.1)
GFR AFRICAN AMERICAN: 57
GFR NON-AFRICAN AMERICAN: 48
GLUCOSE BLD-MCNC: 249 MG/DL (ref 70–99)
POTASSIUM SERPL-SCNC: 3.5 MMOL/L (ref 3.5–5.1)
PRO-BNP: 191 PG/ML (ref 0–124)
SODIUM BLD-SCNC: 135 MMOL/L (ref 136–145)

## 2019-06-11 PROCEDURE — 80048 BASIC METABOLIC PNL TOTAL CA: CPT

## 2019-06-11 PROCEDURE — 36415 COLL VENOUS BLD VENIPUNCTURE: CPT

## 2019-06-11 PROCEDURE — 83880 ASSAY OF NATRIURETIC PEPTIDE: CPT

## 2019-06-12 ENCOUNTER — TELEPHONE (OUTPATIENT)
Dept: CARDIOLOGY CLINIC | Age: 49
End: 2019-06-12

## 2019-06-12 NOTE — TELEPHONE ENCOUNTER
Pt is up 8.2 pounds in the last week. Today's weight is 180.6. Last week it was 172. Baseline weight is 175 pounds.  said Pt took metolazone yesterday.  said that pt does not have any other symptoms.

## 2019-06-13 NOTE — TELEPHONE ENCOUNTER
Called and LM for HHN, Daisy Barahona, KARY with [facility] relaying Dr. Gissel Hubbard comments. To call if weight continues to go up or any further questions.

## 2019-06-18 ENCOUNTER — HOSPITAL ENCOUNTER (OUTPATIENT)
Dept: MRI IMAGING | Age: 49
Discharge: HOME | End: 2019-06-18
Payer: COMMERCIAL

## 2019-06-18 ENCOUNTER — OFFICE VISIT (OUTPATIENT)
Dept: CARDIOLOGY CLINIC | Age: 49
End: 2019-06-18
Payer: COMMERCIAL

## 2019-06-18 VITALS
HEIGHT: 64 IN | WEIGHT: 185.12 LBS | OXYGEN SATURATION: 94 % | DIASTOLIC BLOOD PRESSURE: 60 MMHG | BODY MASS INDEX: 31.6 KG/M2 | SYSTOLIC BLOOD PRESSURE: 124 MMHG | HEART RATE: 89 BPM | RESPIRATION RATE: 16 BRPM

## 2019-06-18 DIAGNOSIS — G44.52 NEW PERSISTENT DAILY HEADACHE: ICD-10-CM

## 2019-06-18 DIAGNOSIS — I25.10 CORONARY ARTERY DISEASE INVOLVING NATIVE CORONARY ARTERY OF NATIVE HEART WITHOUT ANGINA PECTORIS: ICD-10-CM

## 2019-06-18 DIAGNOSIS — I50.32 CHRONIC HEART FAILURE WITH PRESERVED EJECTION FRACTION (HCC): Primary | ICD-10-CM

## 2019-06-18 DIAGNOSIS — R42 VERTIGO: ICD-10-CM

## 2019-06-18 DIAGNOSIS — I10 ESSENTIAL HYPERTENSION: ICD-10-CM

## 2019-06-18 PROCEDURE — 70551 MRI BRAIN STEM W/O DYE: CPT

## 2019-06-18 PROCEDURE — G8417 CALC BMI ABV UP PARAM F/U: HCPCS | Performed by: NURSE PRACTITIONER

## 2019-06-18 PROCEDURE — G8427 DOCREV CUR MEDS BY ELIG CLIN: HCPCS | Performed by: NURSE PRACTITIONER

## 2019-06-18 PROCEDURE — 1036F TOBACCO NON-USER: CPT | Performed by: NURSE PRACTITIONER

## 2019-06-18 PROCEDURE — 3017F COLORECTAL CA SCREEN DOC REV: CPT | Performed by: NURSE PRACTITIONER

## 2019-06-18 PROCEDURE — G8598 ASA/ANTIPLAT THER USED: HCPCS | Performed by: NURSE PRACTITIONER

## 2019-06-18 PROCEDURE — 99214 OFFICE O/P EST MOD 30 MIN: CPT | Performed by: NURSE PRACTITIONER

## 2019-06-18 RX ORDER — CHOLECALCIFEROL (VITAMIN D3) 50 MCG
2000 TABLET ORAL DAILY
Refills: 5 | COMMUNITY
Start: 2019-06-03 | End: 2020-02-12

## 2019-06-18 NOTE — PATIENT INSTRUCTIONS
1. Continue current medications. 2. Call in am if weight above baseline 180-185 lbs. 3. Call Dr Jeannette Stewart to see if samples of Linzess. 4. Follow up in 2 months, earlier for worsening.

## 2019-06-18 NOTE — PROGRESS NOTES
[facility] - Cardiology      Chief Complaint: swelling    Chief Complaint   Patient presents with   John Villavicencio     hasn't had a BM in 3 days; has problems with constipation.  Congestive Heart Failure    Coronary Artery Disease    Hypertension    Chest Pain     \"sometimes' not new    Dizziness     at times    Edema     abdoment    Fatigue     very fatigued    Shortness of Breath     with ambulation, some adls       History of present illness: Doug Salinas is a 47 y.o. female with PMH significant for CAD, breast cancer s/p mastectomy. She has had isues with fluid overload but multiple echos have shown normal EF and normal diastolic function. Cardiac MRI performed showed normal LVEF 61% and no evidence of constriction. [facility] 2/2018 showed LAD with mild plaquing, small vessel disease. Patient presents today for follow HFpEF. She presents to office ambulatory and is accompanied by her .  present for visit but  refuses stating he will act as , wife is concerned about her privacy in the community. Presents today with c/o increased swelling. Weight up 5 lbs today, 185 lbs compared to baseline 179-180 lbs. She has taken metolazone both Sunday and again today. Also taking torsemide 100 mg in am and 50 mg in pm. They attribute much of issue to constipation, patient has been unable to get Linzess covered by her insurance ([employer]). Limits sodium in diet and fluids to under 64 ounces. Has experienced some chest discomfort past couple of days, relieved by NTG, mild non-obstructive CAD on [facility] just over 1 year ago.          Past Medical History:   Diagnosis Date    Anxiety     Anxiety and depression     Arthritis     not sure of specific type    Asthma     CAD (coronary artery disease)     Cancer ([address] 2007    left breast    Cerebral artery occlusion with [facility] infarction [facility]     CHF (congestive heart failure) ([facility] 2018 NAD  Skin:  Warm and dry. HEENT: Normocephalic and atraumatic. Oral mucosa moist, no cyanosis. Neck:  Supple. No JVP appreciated  Chest:  Normal effort. Few wheezes, no crackles  Cardiovascular:  RRR, S1/S2, no murmur/gallop/rub  Abdomen:  Soft, nontender, +bowel sounds  Extremities:  + edema left arm, trace edema BLE  Neurological: No focal deficits  Psychological: Normal mood and affect      Current Outpatient Medications   Medication Sig Dispense Refill    omeprazole (PRILOSEC) 20 MG delayed release capsule Take 1 capsule by mouth 2 times daily 60 capsule 5    Plecanatide (TRULANCE) 3 MG TABS Take 1 tablet by mouth daily 30 tablet 3    Blood Pressure KIT BP monitoring at home 1 kit 0    SENEXON-S 8.6-50 MG per tablet TAKE 2 TABLETS BY MOUTH TWO TIMES A DAY  120 tablet 1    metolazone (ZAROXOLYN) 2.5 MG tablet Take 1 tablet by mouth Twice a Week As needed for weight over 180 pounds. Do not take two days in a row. 60 tablet 2    ULORIC 40 MG TABS tablet Take 40 mg by mouth daily  2    Blood Glucose Monitoring Suppl (FREESTYLE LITE) MINDY Test blood sugar QID 1 Device 0    FREESTYLE LITE strip Test blood sugar  strip 0    torsemide (DEMADEX) 100 MG tablet TAKE 1 TABLET BY MOUTH IN THE MORNING AND ONE-HALF TABLET IN THE EVENING 45 tablet 4    metolazone (ZAROXOLYN) 2.5 MG tablet TAKE 1 TABLET BY MOUTH ONE TIME A DAY AS NEEDED 30 tablet 0    insulin aspart (NOVOLOG FLEXPEN) 100 UNIT/ML injection pen inject 30 units subcutaneously three times daily before meals 15 mL 1    nitroGLYCERIN (NITROSTAT) 0.4 MG SL tablet Place 1 tablet under the tongue every 5 minutes as needed for Chest pain up to max of 3 total doses.  If no relief after 1 dose, call 911. 25 tablet 1    montelukast (SINGULAIR) 10 MG tablet TAKE 1 TABLET BY MOUTH ONE TIME A DAY  30 tablet 4    B-D UF III MINI PEN NEEDLES 31G X 5 MM MISC use five times daily with insulin 200 each 3    levothyroxine (SYNTHROID) 150 MCG tablet Take 1 tablet by mouth every morning (before breakfast) 30 tablet 3    B-D UF III MINI PEN NEEDLES 31G X 5 MM MISC use five times daily with insulin 500 each 5    metoprolol tartrate (LOPRESSOR) 25 MG tablet Take 1 tablet by mouth 2 times daily 90 tablet 3    glipiZIDE (GLUCOTROL XL) 10 MG extended release tablet TAKE 1 TABLET BY MOUTH ONE TIME A DAY 30 tablet 0    Insulin Degludec 200 UNIT/ML SOPN Take 150 units daily 5 pen 3    empagliflozin (JARDIANCE) 10 MG tablet Take 1 tablet by mouth daily 30 tablet 3    Continuous Blood Gluc  (FREESTYLE EMERALD READER) MINDY To monitor glucose 1 Device 2    Continuous Blood Gluc Sensor (FREESTYLE EMERALD 14 DAY SENSOR) MISC Change every 14 days 2 each 5    ferrous sulfate 325 (65 Fe) MG tablet Take 1 tablet by mouth 2 times daily (with meals) 180 tablet 3    polyethylene glycol (GLYCOLAX) powder Take 17 g by mouth nightly 17 g 3    loratadine (CLARITIN) 10 MG tablet Take 1 tablet by mouth daily 90 tablet 1    colchicine (COLCRYS) 0.6 MG tablet Take 0.6 mg by mouth 2 times daily       simvastatin (ZOCOR) 20 MG tablet Take 1 tablet by mouth nightly 90 tablet 3    spironolactone (ALDACTONE) 50 MG tablet 100mg morning, 50mg evening (Patient taking differently: Take 200 mg by mouth daily 100mg morning, 50mg evening) 270 tablet 3    albuterol sulfate  (90 Base) MCG/ACT inhaler Inhale 2 puffs into the lungs every 6 hours as needed for Wheezing (Patient taking differently: Inhale 2 puffs into the lungs 2 times daily ) 1 Inhaler 6    OXYGEN Inhale 3 L into the lungs nightly Sometimes with activity      oxyCODONE (OXYCONTIN) 20 MG extended release tablet Take 20 mg by mouth every 12 hours.       aspirin 81 MG EC tablet Take 81 mg by mouth daily      sulfaSALAzine (AZULFIDINE) 500 MG tablet Take 500 mg by mouth 2 times daily      calcium carbonate-vitamin D (CALCIUM 600+D) 600-200 MG-UNIT TABS Take 1 tablet by mouth 2 times daily      benztropine (COGENTIN) 1 MG tablet Take 1 mg by mouth nightly       Cholecalciferol (VITAMIN D) 2000 units CAPS capsule Take 1 capsule by mouth daily (with breakfast)      folic acid (FOLVITE) 1 MG tablet Take 1 mg by mouth daily      lurasidone (LATUDA) 40 MG TABS tablet Take 40 mg by mouth Daily with supper       leflunomide (ARAVA) 20 MG tablet Take 20 mg by mouth daily      albuterol (ACCUNEB) 1.25 MG/3ML nebulizer solution Inhale 1 ampule into the lungs every 6 hours as needed for Wheezing      mometasone-formoterol (DULERA) 200-5 MCG/ACT inhaler Inhale 2 puffs into the lungs every 12 hours      fluticasone (FLONASE) 50 MCG/ACT nasal spray 1 spray by Nasal route daily (Patient taking differently: 1 spray by Nasal route daily ) 1 Bottle 0    oxyCODONE-acetaminophen (PERCOCET)  MG per tablet Take 1 tablet by mouth every 4 hours as needed for Pain . Earliest Fill Date: 6/8/17 100 tablet 0    Elastic Bandages & Supports (FUTURO SUPPORT GLOVE MEDIUM) MISC 1 ready made support glove 1 each 2    Compression Sleeve MISC by Does not apply route 1 sleeve, 20-30 mmHg 1 each 2    ALPRAZolam (XANAX) 1 MG tablet Take 1 mg by mouth 2 times daily .  LYRICA 50 MG capsule TAKE 1 CAPSULE BY MOUTH TWICE DAILY (Patient taking differently: 1 tab BID) 60 capsule 0    duloxetine (CYMBALTA) 60 MG capsule Take 60 mg by mouth 2 times daily. No current facility-administered medications for this visit.         Labs:   Lab Results   Component Value Date    WBC 6.7 02/18/2019    HGB 11.1 (L) 02/18/2019    HCT 34.0 (L) 02/18/2019    MCV 88.8 02/18/2019     02/18/2019     Lab Results   Component Value Date     06/11/2019    K 3.5 06/11/2019    K 3.8 09/11/2018    CL 95 06/11/2019    CO2 26 06/11/2019    BUN 40 06/11/2019    CREATININE 1.2 06/11/2019    GLUCOSE 249 06/11/2019    GLUCOSE 313 05/16/2017    CALCIUM 9.6 06/11/2019      Lab Results   Component Value Date    TRIG 409 02/18/2019    HDL 38 02/18/2019    LDLCALC see below 02/18/2019    LDLDIRECT 107 02/18/2019    LABVLDL see below 02/18/2019       Diagnostics:    Echo 11/8/2017:  Normal left ventricle size and systolic function with an estimated ejection fraction of 60%. No regional wall motion abnormalities are seen. There is borderline concentric left ventricular hypertrophy.   E/e\"= 13    VQ scan 1/11/2018:  Normal study.  No evidence of pulmonary embolus.        Left Heart Cath 2/27/18:  Dominance : Right     LM: bifurcating, MLI  LAD: mild plaquing with small vessel disease distally   LCx: no significant disease  RCA: MLI     LVEDP: 25 mmHg   No Ao gradient     Right Heart Cath   RA: 13  RV: 47/6/16  PA:   46/19    and mean of 32  PCWP: 21 mmHg      Sats:  Ao: 92%  RA: 61%  PA: 62% (x 2)     CO/CI : 6.1 L      Assessment:  1. (HFpEF) heart failure with preserved ejection fraction. Does not appear grossly volume overloaded despite weight gain. On torsemide, spironolactone and metolazone   2. Coronary artery disease. [facility] 2018 with non-obstructive CAD. On asa, beta blocker and statin. 3. Essential hypertension. Controlled. Under age 60, goal BP <140/90. Plan:  1. Continue current medications. 2. Call in am if weight above baseline 180-185 lbs. 3. Call Dr Analilia Horton to see if samples of Linzess available. 4. Follow up in 2 months, earlier for worsening. Thank you for allowing me to participate in the care of your patient.     Payal Huerta, APRN-CNP

## 2019-06-24 ENCOUNTER — TELEPHONE (OUTPATIENT)
Dept: CARDIOLOGY CLINIC | Age: 49
End: 2019-06-24

## 2019-06-24 DIAGNOSIS — N28.9 RENAL INSUFFICIENCY: Chronic | ICD-10-CM

## 2019-06-24 DIAGNOSIS — R06.02 SOB (SHORTNESS OF BREATH): Chronic | ICD-10-CM

## 2019-06-24 DIAGNOSIS — I25.10 CORONARY ARTERY DISEASE INVOLVING NATIVE CORONARY ARTERY OF NATIVE HEART WITHOUT ANGINA PECTORIS: ICD-10-CM

## 2019-06-24 DIAGNOSIS — I10 ESSENTIAL HYPERTENSION: ICD-10-CM

## 2019-06-24 DIAGNOSIS — I50.22 SYSTOLIC CHF, CHRONIC (HCC): ICD-10-CM

## 2019-06-24 DIAGNOSIS — I50.30 (HFPEF) HEART FAILURE WITH PRESERVED EJECTION FRACTION (HCC): Chronic | ICD-10-CM

## 2019-06-24 DIAGNOSIS — I25.10 CORONARY ARTERY DISEASE INVOLVING NATIVE CORONARY ARTERY OF NATIVE HEART WITHOUT ANGINA PECTORIS: Chronic | ICD-10-CM

## 2019-06-24 RX ORDER — SPIRONOLACTONE 50 MG/1
TABLET, FILM COATED ORAL
Qty: 90 TABLET | Refills: 0 | Status: SHIPPED | OUTPATIENT
Start: 2019-06-24 | End: 2019-07-22 | Stop reason: SDUPTHER

## 2019-06-24 NOTE — TELEPHONE ENCOUNTER
Jessica carrington/[facility] would like a call to discuss other drug interactions with the spironolactone. Please call to advise. Thank you.

## 2019-06-24 NOTE — TELEPHONE ENCOUNTER
Attempted to return call. Jimmie Cowden has left for the day. Spoke with pharmacist who stated there was concern that patient was taking both torsemide and high dose spironolactone. Notified that we are monitoring renal numbers. No other issues identified.

## 2019-06-25 ENCOUNTER — HOSPITAL ENCOUNTER (OUTPATIENT)
Age: 49
Setting detail: SPECIMEN
Discharge: HOME | End: 2019-06-25
Payer: COMMERCIAL

## 2019-06-25 LAB
ANION GAP SERPL CALCULATED.3IONS-SCNC: 13 MMOL/L (ref 3–16)
BUN BLDV-MCNC: 27 MG/DL (ref 7–20)
CALCIUM SERPL-MCNC: 10.1 MG/DL (ref 8.3–10.6)
CHLORIDE BLD-SCNC: 89 MMOL/L (ref 99–110)
CO2: 32 MMOL/L (ref 21–32)
CREAT SERPL-MCNC: 1.1 MG/DL (ref 0.6–1.1)
GFR AFRICAN AMERICAN: >60
GFR NON-AFRICAN AMERICAN: 53
GLUCOSE BLD-MCNC: 102 MG/DL (ref 70–99)
POTASSIUM SERPL-SCNC: 3.6 MMOL/L (ref 3.5–5.1)
PRO-BNP: 155 PG/ML (ref 0–124)
SODIUM BLD-SCNC: 134 MMOL/L (ref 136–145)
URIC ACID, SERUM: 8.3 MG/DL (ref 2.6–6)

## 2019-06-25 PROCEDURE — 36415 COLL VENOUS BLD VENIPUNCTURE: CPT

## 2019-06-25 PROCEDURE — 84550 ASSAY OF BLOOD/URIC ACID: CPT

## 2019-06-25 PROCEDURE — 80048 BASIC METABOLIC PNL TOTAL CA: CPT

## 2019-06-25 PROCEDURE — 83880 ASSAY OF NATRIURETIC PEPTIDE: CPT

## 2019-06-26 ENCOUNTER — TELEPHONE (OUTPATIENT)
Dept: RHEUMATOLOGY | Age: 49
End: 2019-06-26

## 2019-06-26 ENCOUNTER — TELEPHONE (OUTPATIENT)
Dept: CARDIOLOGY CLINIC | Age: 49
End: 2019-06-26

## 2019-06-26 RX ORDER — RANOLAZINE 500 MG/1
500 TABLET, EXTENDED RELEASE ORAL 2 TIMES DAILY
Qty: 60 TABLET | Refills: 5 | Status: SHIPPED | OUTPATIENT
Start: 2019-06-26 | End: 2019-12-24

## 2019-06-26 RX ORDER — LEVOTHYROXINE SODIUM 0.15 MG/1
150 TABLET ORAL
Qty: 90 TABLET | Refills: 3 | Status: SHIPPED | OUTPATIENT
Start: 2019-06-26 | End: 2020-06-03 | Stop reason: SDUPTHER

## 2019-06-26 NOTE — TELEPHONE ENCOUNTER
Patient complaining of chest pain the last few days . She has taken her nitro and it has helped the pain but it gives her headaches .  wants to know if there is something she could take every day to help with her chest pain ?

## 2019-06-26 NOTE — TELEPHONE ENCOUNTER
Fax from Sam Gonzalez refill request for Levothyroxine Sodium Oral Tablet 150MCG    LOV   5-7-19  FOV   8-13-19

## 2019-07-02 ENCOUNTER — TELEPHONE (OUTPATIENT)
Dept: CARDIOLOGY CLINIC | Age: 49
End: 2019-07-02

## 2019-07-02 NOTE — TELEPHONE ENCOUNTER
----- Message from Deandra Fry MD sent at 7/1/2019 11:44 PM EDT -----  Call pt. Her labs look good. Uric acid level is a little higher. No changes.   Deandra Fry

## 2019-07-10 ENCOUNTER — HOSPITAL ENCOUNTER (OUTPATIENT)
Age: 49
Setting detail: SPECIMEN
Discharge: HOME | End: 2019-07-10
Payer: COMMERCIAL

## 2019-07-10 ENCOUNTER — OFFICE VISIT (OUTPATIENT)
Dept: INTERNAL MEDICINE CLINIC | Age: 49
End: 2019-07-10
Payer: COMMERCIAL

## 2019-07-10 VITALS
HEART RATE: 80 BPM | SYSTOLIC BLOOD PRESSURE: 98 MMHG | BODY MASS INDEX: 32.06 KG/M2 | DIASTOLIC BLOOD PRESSURE: 64 MMHG | OXYGEN SATURATION: 93 % | WEIGHT: 186.8 LBS

## 2019-07-10 DIAGNOSIS — M06.9 RHEUMATOID ARTHRITIS INVOLVING MULTIPLE SITES, UNSPECIFIED RHEUMATOID FACTOR PRESENCE: ICD-10-CM

## 2019-07-10 DIAGNOSIS — M62.838 NECK MUSCLE SPASM: ICD-10-CM

## 2019-07-10 DIAGNOSIS — R60.0 EDEMA OF RIGHT LOWER EXTREMITY: ICD-10-CM

## 2019-07-10 DIAGNOSIS — K59.03 CONSTIPATION DUE TO PAIN MEDICATION: ICD-10-CM

## 2019-07-10 DIAGNOSIS — R13.12 OROPHARYNGEAL DYSPHAGIA: ICD-10-CM

## 2019-07-10 DIAGNOSIS — G47.10 HYPERSOMNOLENCE: ICD-10-CM

## 2019-07-10 DIAGNOSIS — F32.A ANXIETY AND DEPRESSION: ICD-10-CM

## 2019-07-10 DIAGNOSIS — R53.83 FATIGUE, UNSPECIFIED TYPE: Primary | ICD-10-CM

## 2019-07-10 DIAGNOSIS — F41.9 ANXIETY AND DEPRESSION: ICD-10-CM

## 2019-07-10 PROBLEM — N39.0 URINARY TRACT INFECTION DUE TO ESBL KLEBSIELLA: Status: RESOLVED | Noted: 2018-03-16 | Resolved: 2019-07-10

## 2019-07-10 PROBLEM — B96.89 URINARY TRACT INFECTION DUE TO ESBL KLEBSIELLA: Status: RESOLVED | Noted: 2018-03-16 | Resolved: 2019-07-10

## 2019-07-10 PROBLEM — I16.0 HYPERTENSIVE URGENCY: Status: RESOLVED | Noted: 2019-05-26 | Resolved: 2019-07-10

## 2019-07-10 PROBLEM — R51.9 ACUTE INTRACTABLE HEADACHE: Status: RESOLVED | Noted: 2019-05-21 | Resolved: 2019-07-10

## 2019-07-10 LAB
ANION GAP SERPL CALCULATED.3IONS-SCNC: 12 MMOL/L (ref 3–16)
BUN BLDV-MCNC: 27 MG/DL (ref 7–20)
CALCIUM SERPL-MCNC: 9.6 MG/DL (ref 8.3–10.6)
CHLORIDE BLD-SCNC: 98 MMOL/L (ref 99–110)
CO2: 31 MMOL/L (ref 21–32)
CREAT SERPL-MCNC: 1.1 MG/DL (ref 0.6–1.1)
GFR AFRICAN AMERICAN: >60
GFR NON-AFRICAN AMERICAN: 53
GLUCOSE BLD-MCNC: 257 MG/DL (ref 70–99)
POTASSIUM SERPL-SCNC: 4.8 MMOL/L (ref 3.5–5.1)
PRO-BNP: 160 PG/ML (ref 0–124)
SODIUM BLD-SCNC: 141 MMOL/L (ref 136–145)
URIC ACID, SERUM: 7.4 MG/DL (ref 2.6–6)

## 2019-07-10 PROCEDURE — 80048 BASIC METABOLIC PNL TOTAL CA: CPT

## 2019-07-10 PROCEDURE — 36415 COLL VENOUS BLD VENIPUNCTURE: CPT

## 2019-07-10 PROCEDURE — G8598 ASA/ANTIPLAT THER USED: HCPCS | Performed by: INTERNAL MEDICINE

## 2019-07-10 PROCEDURE — 1036F TOBACCO NON-USER: CPT | Performed by: INTERNAL MEDICINE

## 2019-07-10 PROCEDURE — 3017F COLORECTAL CA SCREEN DOC REV: CPT | Performed by: INTERNAL MEDICINE

## 2019-07-10 PROCEDURE — G8417 CALC BMI ABV UP PARAM F/U: HCPCS | Performed by: INTERNAL MEDICINE

## 2019-07-10 PROCEDURE — G8427 DOCREV CUR MEDS BY ELIG CLIN: HCPCS | Performed by: INTERNAL MEDICINE

## 2019-07-10 PROCEDURE — 83880 ASSAY OF NATRIURETIC PEPTIDE: CPT

## 2019-07-10 PROCEDURE — 99214 OFFICE O/P EST MOD 30 MIN: CPT | Performed by: INTERNAL MEDICINE

## 2019-07-10 PROCEDURE — 84550 ASSAY OF BLOOD/URIC ACID: CPT

## 2019-07-10 RX ORDER — TIZANIDINE 2 MG/1
2 TABLET ORAL EVERY 8 HOURS PRN
Qty: 30 TABLET | Refills: 1 | Status: SHIPPED | OUTPATIENT
Start: 2019-07-10 | End: 2019-10-12 | Stop reason: SDUPTHER

## 2019-07-10 NOTE — PROGRESS NOTES
Patient: Katrina Nevarez is a 47 y.o. female who presents today with the following Chief Complaint(s):  Chief Complaint   Patient presents with    Follow-up       HPI     Here today for follow up. Noted to have low BP- 98/64. Feels ok. Did just take her water pills. Does admit to feeling dizzy and weak. Did see ENT- swallow study was unremarkable for aspiration. Did have residual barium in her esophagus. Has appointment with Dr. Anand Perez for GI coming up later this month (7/18). Constipation- insurance would not cover Linzess or Amitiza. Her cardiologist did give her a prescription for something that she has tried. Did not work very well. Both Linzess and Amitiza have worked very well for her in the past.     Is also c/o right leg swelling for 1 week now. Elevating her leg helps a little. Hurts when she walks. Does not have a h/o DVT. Leg is painful. Is having some SOB- on nocturnal oxygen. Does retain CO2. Has appointment with pulmonology tomorrow. Arthritis is flared currently. Bones hurt. Hurts all over. Gets HA as well. Is always tired. Has low energy. Does not wear a CPAP. Needs to have repeat sleep study to check CPAP titration. May need BiPap. Does not want to wear the mask. Does not want to do the test.  notices that she stops breathing at night. Did attempt CPAP but did not like it. Also states that she has difficulty cleaning her CPAP machine d/t her arthritis in her hands. Also c/o hands hurting. Difficulty with cooking. Difficulty holding knife to chop her food. Also has difficulty showering. Has difficulty taking care of her hair. [facility] Has had HH in the past and did not like them.  does help her as he can but there are times that he is unable to help. Is very tearful today. Under a lot of stress related to her daughter. Is still working with psychiatry. Have decreased Xanax to 0.25 mg BID. Getting muscle spasms in her neck. Asking for muscle relaxer.      Allergies   Allergen Reactions    Iv Dye [Iodides] Anaphylaxis     allergic to ct scan dye, not the MRI    Basaglar Kwikpen [Insulin Glargine]      High blood sugar     Trazodone And Nefazodone Other (See Comments)     Makes patient feel very sluggish      Past Medical History:   Diagnosis Date    Anxiety     Anxiety and depression     Arthritis     not sure of specific type    Asthma     CAD (coronary artery disease)     Cancer ([facility] [address] 2007    left breast    Cerebral artery occlusion with cerebral infarction ([facility] [address]     CHF (congestive heart failure) ([facility] [address] 2018    Chronic kidney disease     renal insufficency/to see Dr Isis Welch    Chronic pain     Constipation     COPD (chronic obstructive pulmonary disease) ([facility] [address]     Depression     Diabetes mellitus ([facility] [address]     Diabetic polyneuropathy associated with type 2 diabetes mellitus ([facility] [address] 2/2/2018    Dysthymia 2/2/2018    ESBL (extended spectrum beta-lactamase) producing bacteria infection 03/14/2018    urine    Fibromyalgia     Gastric ulcer, unspecified as acute or chronic, without mention of hemorrhage, perforation, or obstruction     GERD (gastroesophageal reflux disease)     Gout     HIGH CHOLESTEROL     Hypertension     Hypothyroidism     Severe persistent asthma without complication 9/7/5408    Thyroid disease     TIA (transient ischemic attack)     with occasional left leg and hand weakness      Past Surgical History:   Procedure Laterality Date    BREAST SURGERY      left mastectomy    CARPAL TUNNEL RELEASE      Bilateral    FINGER CONTRACTURE SURGERY      HYSTERECTOMY      TONSILLECTOMY        Social History     Socioeconomic History    Marital status:      Spouse name: Nikky Johnson Number of children: 3    Years of education: Not on file    Highest education level: Not on file   Occupational History    Occupation: disabled   Social Needs    Financial resource strain: Not on file no cervical adenopathy. Neurological: She is alert. Psychiatric: She exhibits a depressed mood. tearful       ASSESSMENT/PLAN:    Problem List Items Addressed This Visit     Anxiety and depression     Worse currently. Her  states that he will call her psychiatrist and set up an appointment. Medications were recently adjusted. Constipation due to pain medication     Linzess and Amitiza were not covered by insurance. It looks like she has a prescription for Trulance. Resume Trulance. Edema of right lower extremity     Concern for DVT- has risk factors of inactivity, previous CA. Check stat LE Doppler (scheduled and will be done tomorrow morning rather than this afternoon). Relevant Orders    US DUP LOWER EXTREMITY RIGHT JESUS    Fatigue - Primary     Likely related to untreated LUIS and worsening depression. Declines CPAP. Also likely related to multiple health problems, pain, medications. Hypersomnolence     Likely related to untreated LUIS. Does wear nocturnal oxygen. Discussed that nocturnal oxygen will not treat LUIS. Oropharyngeal dysphagia     Has appointment with GI later this month. MBS was unremarkable for swallowing dysfunction, did show some residual barium in her esophagus. RA (rheumatoid arthritis) (HCC)     Appears to be flared currently. Follows with rheumatology.           Relevant Medications    tiZANidine (ZANAFLEX) 2 MG tablet      Other Visit Diagnoses     Neck muscle spasm        Relevant Medications    tiZANidine (ZANAFLEX) 2 MG tablet          Current Outpatient Medications   Medication Sig Dispense Refill    tiZANidine (ZANAFLEX) 2 MG tablet Take 1 tablet by mouth every 8 hours as needed (pain and spasm) 30 tablet 1    levothyroxine (SYNTHROID) 150 MCG tablet Take 1 tablet by mouth every morning (before breakfast) 90 tablet 3    ranolazine (RANEXA) 500 MG extended release tablet Take 1 tablet by mouth 2 times daily 60 tablet 5    spironolactone (ALDACTONE) 50 MG tablet TAKE 2 TABLETS BY MOUTH IN THE MORNING AND THEN TAKE 1 TABLET BY MOUTH IN THE EVENING 90 tablet 0    Insulin Degludec (TRESIBA FLEXTOUCH) 200 UNIT/ML SOPN inject 150 units subcutaneously daily 15 mL 2    Cholecalciferol (VITAMIN D) 2000 units TABS tablet Take 2,000 Units by mouth daily  5    omeprazole (PRILOSEC) 20 MG delayed release capsule Take 1 capsule by mouth 2 times daily 60 capsule 5    Plecanatide (TRULANCE) 3 MG TABS Take 1 tablet by mouth daily 30 tablet 3    Blood Pressure KIT BP monitoring at home 1 kit 0    SENEXON-S 8.6-50 MG per tablet TAKE 2 TABLETS BY MOUTH TWO TIMES A DAY  120 tablet 1    ULORIC 40 MG TABS tablet Take 40 mg by mouth daily  2    Blood Glucose Monitoring Suppl (FREESTYLE LITE) MINDY Test blood sugar QID 1 Device 0    FREESTYLE LITE strip Test blood sugar  strip 0    torsemide (DEMADEX) 100 MG tablet TAKE 1 TABLET BY MOUTH IN THE MORNING AND ONE-HALF TABLET IN THE EVENING 45 tablet 4    metolazone (ZAROXOLYN) 2.5 MG tablet TAKE 1 TABLET BY MOUTH ONE TIME A DAY AS NEEDED 30 tablet 0    insulin aspart (NOVOLOG FLEXPEN) 100 UNIT/ML injection pen inject 30 units subcutaneously three times daily before meals 15 mL 1    montelukast (SINGULAIR) 10 MG tablet TAKE 1 TABLET BY MOUTH ONE TIME A DAY  30 tablet 4    B-D UF III MINI PEN NEEDLES 31G X 5 MM MISC use five times daily with insulin 200 each 3    B-D UF III MINI PEN NEEDLES 31G X 5 MM MISC use five times daily with insulin 500 each 5    metoprolol tartrate (LOPRESSOR) 25 MG tablet Take 1 tablet by mouth 2 times daily 90 tablet 3    glipiZIDE (GLUCOTROL XL) 10 MG extended release tablet TAKE 1 TABLET BY MOUTH ONE TIME A DAY 30 tablet 0    empagliflozin (JARDIANCE) 10 MG tablet Take 1 tablet by mouth daily 30 tablet 3    Continuous Blood Gluc  (FREESTYLE EMERALD READER) MINDY To monitor glucose 1 Device 2    Continuous Blood Gluc Sensor (FREESTYLE EMERALD 14 DAY SENSOR) 2    ALPRAZolam (XANAX) 1 MG tablet Take 1 mg by mouth 2 times daily .  LYRICA 50 MG capsule TAKE 1 CAPSULE BY MOUTH TWICE DAILY (Patient taking differently: 1 tab BID) 60 capsule 0    duloxetine (CYMBALTA) 60 MG capsule Take 60 mg by mouth 2 times daily.  nitroGLYCERIN (NITROSTAT) 0.4 MG SL tablet Place 1 tablet under the tongue every 5 minutes as needed for Chest pain up to max of 3 total doses. If no relief after 1 dose, call 911. 25 tablet 1    loratadine (CLARITIN) 10 MG tablet Take 1 tablet by mouth daily 90 tablet 1    fluticasone (FLONASE) 50 MCG/ACT nasal spray 1 spray by Nasal route daily (Patient taking differently: 1 spray by Nasal route daily ) 1 Bottle 0     No current facility-administered medications for this visit. Return in about 2 months (around 9/10/2019).

## 2019-07-11 ENCOUNTER — TELEPHONE (OUTPATIENT)
Dept: CARDIOLOGY CLINIC | Age: 49
End: 2019-07-11

## 2019-07-11 ENCOUNTER — HOSPITAL ENCOUNTER (OUTPATIENT)
Dept: VASCULAR LAB | Age: 49
Discharge: HOME | End: 2019-07-11
Payer: COMMERCIAL

## 2019-07-11 DIAGNOSIS — R60.0 EDEMA OF RIGHT LOWER EXTREMITY: ICD-10-CM

## 2019-07-11 PROCEDURE — 93971 EXTREMITY STUDY: CPT

## 2019-07-12 ASSESSMENT — ENCOUNTER SYMPTOMS
CHEST TIGHTNESS: 0
CONSTIPATION: 1
TROUBLE SWALLOWING: 1
CHOKING: 1
SHORTNESS OF BREATH: 0

## 2019-07-13 NOTE — ASSESSMENT & PLAN NOTE
Concern for DVT- has risk factors of inactivity, previous CA. Check stat LE Doppler (scheduled and will be done tomorrow morning rather than this afternoon).

## 2019-07-13 NOTE — ASSESSMENT & PLAN NOTE
Linzess and Amitiza were not covered by insurance. It looks like she has a prescription for Trulance. Resume Trulance.

## 2019-07-17 ENCOUNTER — TELEPHONE (OUTPATIENT)
Dept: CARDIOLOGY CLINIC | Age: 49
End: 2019-07-17

## 2019-07-17 NOTE — TELEPHONE ENCOUNTER
I spoke to the home care nurse briefly who is busy and is to call us back    Would like for home care to send me a copy of what Dr Sybil Price is to sign before I will ok order as Dr Sybil Price is currently out of office

## 2019-07-20 DIAGNOSIS — K59.03 CONSTIPATION DUE TO PAIN MEDICATION: ICD-10-CM

## 2019-07-22 DIAGNOSIS — I25.10 CORONARY ARTERY DISEASE INVOLVING NATIVE CORONARY ARTERY OF NATIVE HEART WITHOUT ANGINA PECTORIS: Chronic | ICD-10-CM

## 2019-07-22 DIAGNOSIS — I50.30 (HFPEF) HEART FAILURE WITH PRESERVED EJECTION FRACTION (HCC): Chronic | ICD-10-CM

## 2019-07-22 DIAGNOSIS — I10 ESSENTIAL HYPERTENSION: ICD-10-CM

## 2019-07-22 DIAGNOSIS — I50.22 SYSTOLIC CHF, CHRONIC (HCC): ICD-10-CM

## 2019-07-22 DIAGNOSIS — R06.02 SOB (SHORTNESS OF BREATH): Chronic | ICD-10-CM

## 2019-07-22 DIAGNOSIS — N28.9 RENAL INSUFFICIENCY: Chronic | ICD-10-CM

## 2019-07-22 RX ORDER — SENNOSIDES AND DOCUSATE SODIUM 8.6; 5 MG/1; MG/1
TABLET, FILM COATED ORAL
Qty: 120 TABLET | Refills: 0 | Status: SHIPPED | OUTPATIENT
Start: 2019-07-22 | End: 2019-09-22 | Stop reason: SDUPTHER

## 2019-07-22 RX ORDER — LORATADINE 10 MG/1
TABLET ORAL
Qty: 30 TABLET | Refills: 0 | Status: SHIPPED | OUTPATIENT
Start: 2019-07-22 | End: 2019-10-21

## 2019-07-22 RX ORDER — SPIRONOLACTONE 50 MG/1
TABLET, FILM COATED ORAL
Qty: 90 TABLET | Refills: 0 | Status: SHIPPED | OUTPATIENT
Start: 2019-07-22 | End: 2019-08-23 | Stop reason: SDUPTHER

## 2019-07-23 ENCOUNTER — TELEPHONE (OUTPATIENT)
Dept: CARDIOLOGY CLINIC | Age: 49
End: 2019-07-23

## 2019-07-23 ENCOUNTER — HOSPITAL ENCOUNTER (OUTPATIENT)
Age: 49
Setting detail: SPECIMEN
Discharge: HOME | End: 2019-07-23
Payer: COMMERCIAL

## 2019-07-23 LAB
ANION GAP SERPL CALCULATED.3IONS-SCNC: 12 MMOL/L (ref 3–16)
BUN BLDV-MCNC: 33 MG/DL (ref 7–20)
CALCIUM SERPL-MCNC: 9.3 MG/DL (ref 8.3–10.6)
CHLORIDE BLD-SCNC: 90 MMOL/L (ref 99–110)
CO2: 29 MMOL/L (ref 21–32)
CREAT SERPL-MCNC: 1.2 MG/DL (ref 0.6–1.1)
GFR AFRICAN AMERICAN: 57
GFR NON-AFRICAN AMERICAN: 47
GLUCOSE BLD-MCNC: 399 MG/DL (ref 70–99)
POTASSIUM SERPL-SCNC: 5.2 MMOL/L (ref 3.5–5.1)
PRO-BNP: 116 PG/ML (ref 0–124)
SODIUM BLD-SCNC: 131 MMOL/L (ref 136–145)

## 2019-07-23 PROCEDURE — 83880 ASSAY OF NATRIURETIC PEPTIDE: CPT

## 2019-07-23 PROCEDURE — 80048 BASIC METABOLIC PNL TOTAL CA: CPT

## 2019-07-23 PROCEDURE — 36415 COLL VENOUS BLD VENIPUNCTURE: CPT

## 2019-07-23 NOTE — TELEPHONE ENCOUNTER
Pt is asking home care if a B12 blood work could be ordered with their standard blood work.  Please call to advise

## 2019-07-24 DIAGNOSIS — I50.30 (HFPEF) HEART FAILURE WITH PRESERVED EJECTION FRACTION (HCC): Chronic | ICD-10-CM

## 2019-07-24 DIAGNOSIS — N28.9 RENAL INSUFFICIENCY: Chronic | ICD-10-CM

## 2019-07-24 DIAGNOSIS — I50.22 SYSTOLIC CHF, CHRONIC (HCC): ICD-10-CM

## 2019-07-24 DIAGNOSIS — I10 ESSENTIAL HYPERTENSION: ICD-10-CM

## 2019-07-24 DIAGNOSIS — I25.10 CORONARY ARTERY DISEASE INVOLVING NATIVE CORONARY ARTERY OF NATIVE HEART WITHOUT ANGINA PECTORIS: Chronic | ICD-10-CM

## 2019-07-24 DIAGNOSIS — R06.02 SOB (SHORTNESS OF BREATH): Chronic | ICD-10-CM

## 2019-07-24 DIAGNOSIS — I25.10 CORONARY ARTERY DISEASE INVOLVING NATIVE CORONARY ARTERY OF NATIVE HEART WITHOUT ANGINA PECTORIS: ICD-10-CM

## 2019-07-25 ENCOUNTER — TELEPHONE (OUTPATIENT)
Dept: CARDIOLOGY CLINIC | Age: 49
End: 2019-07-25

## 2019-07-25 NOTE — TELEPHONE ENCOUNTER
----- Message from HERMES Rosado - CNS sent at 7/25/2019  3:04 PM EDT -----  Covering labs as Dr Nathan Lucia is out of the office  Blood sugar is 399 which is very elevated for her.   Make sure she is treating her blood sugars  Fluid level is improved  Recheck labs next week as sodium is down and potassium elevated most likely due to blood sugars being elevated  thanks

## 2019-08-02 ENCOUNTER — TELEPHONE (OUTPATIENT)
Dept: CARDIOLOGY CLINIC | Age: 49
End: 2019-08-02

## 2019-08-05 ENCOUNTER — OFFICE VISIT (OUTPATIENT)
Dept: PULMONOLOGY | Age: 49
End: 2019-08-05
Payer: COMMERCIAL

## 2019-08-05 ENCOUNTER — TELEPHONE (OUTPATIENT)
Dept: CARDIOLOGY CLINIC | Age: 49
End: 2019-08-05

## 2019-08-05 VITALS
RESPIRATION RATE: 20 BRPM | HEART RATE: 81 BPM | DIASTOLIC BLOOD PRESSURE: 81 MMHG | WEIGHT: 195 LBS | SYSTOLIC BLOOD PRESSURE: 128 MMHG | OXYGEN SATURATION: 97 % | BODY MASS INDEX: 33.47 KG/M2

## 2019-08-05 DIAGNOSIS — J45.40 MODERATE PERSISTENT ASTHMA WITHOUT COMPLICATION: Primary | ICD-10-CM

## 2019-08-05 DIAGNOSIS — I50.33 ACUTE ON CHRONIC DIASTOLIC CONGESTIVE HEART FAILURE (HCC): ICD-10-CM

## 2019-08-05 PROCEDURE — G8417 CALC BMI ABV UP PARAM F/U: HCPCS | Performed by: INTERNAL MEDICINE

## 2019-08-05 PROCEDURE — G8598 ASA/ANTIPLAT THER USED: HCPCS | Performed by: INTERNAL MEDICINE

## 2019-08-05 PROCEDURE — G8427 DOCREV CUR MEDS BY ELIG CLIN: HCPCS | Performed by: INTERNAL MEDICINE

## 2019-08-05 PROCEDURE — 1036F TOBACCO NON-USER: CPT | Performed by: INTERNAL MEDICINE

## 2019-08-05 PROCEDURE — 3017F COLORECTAL CA SCREEN DOC REV: CPT | Performed by: INTERNAL MEDICINE

## 2019-08-05 PROCEDURE — 99213 OFFICE O/P EST LOW 20 MIN: CPT | Performed by: INTERNAL MEDICINE

## 2019-08-05 ASSESSMENT — ENCOUNTER SYMPTOMS
STRIDOR: 0
SHORTNESS OF BREATH: 1
APNEA: 0
BACK PAIN: 0
BLOOD IN STOOL: 0
RHINORRHEA: 0
DIARRHEA: 0
ANAL BLEEDING: 0
ABDOMINAL PAIN: 0
CHEST TIGHTNESS: 0
SORE THROAT: 0
CONSTIPATION: 0
WHEEZING: 0
ABDOMINAL DISTENTION: 0
VOICE CHANGE: 0
SINUS PRESSURE: 0
CHOKING: 0
COUGH: 0

## 2019-08-05 NOTE — PROGRESS NOTES
SURGERY      HYSTERECTOMY      TONSILLECTOMY       Family History   Problem Relation Age of Onset    Asthma Other     Cancer Other     Depression Other     Diabetes Other     Hypertension Other     High Cholesterol Other     Migraines Other     Heart Attack Father 72         of MI    High Blood Pressure Mother     Diabetes Mother        Review of Systems:  Review of Systems   Constitutional: Positive for fatigue and unexpected weight change. Negative for activity change, appetite change and fever. HENT: Negative for congestion, ear discharge, ear pain, postnasal drip, rhinorrhea, sinus pressure, sneezing, sore throat, tinnitus and voice change. Respiratory: Positive for shortness of breath. Negative for apnea, cough, choking, chest tightness, wheezing and stridor. Cardiovascular: Negative for chest pain, palpitations and leg swelling. Gastrointestinal: Negative for abdominal distention, abdominal pain, anal bleeding, blood in stool, constipation and diarrhea. Musculoskeletal: Negative for arthralgias, back pain and gait problem. Skin: Negative for pallor and rash. Allergic/Immunologic: Negative for environmental allergies. Neurological: Negative for dizziness, tremors, seizures, syncope, speech difficulty, weakness, light-headedness, numbness and headaches. Hematological: Negative for adenopathy. Does not bruise/bleed easily. Psychiatric/Behavioral: Positive for sleep disturbance. Vitals:    19 1504   BP: 128/81   Pulse: 81   Resp: 20   SpO2: 97%   Weight: 195 lb (88.5 kg)     Body mass index is 33.47 kg/m². Wt Readings from Last 3 Encounters:   19 195 lb (88.5 kg)   07/10/19 186 lb 12.8 oz (84.7 kg)   19 185 lb 1.9 oz (84 kg)     BP Readings from Last 3 Encounters:   19 128/81   07/10/19 98/64   19 124/60         Physical Exam   Constitutional: She is oriented to person, place, and time. She appears well-developed and well-nourished.  No distress. HENT:   Mouth/Throat: Oropharynx is clear and moist. No oropharyngeal exudate. Cardiovascular: Normal rate, regular rhythm, normal heart sounds and intact distal pulses. No murmur heard. Pulmonary/Chest: Breath sounds normal. No respiratory distress. She has no wheezes. She has no rales. She exhibits no tenderness. Abdominal: She exhibits no distension and no mass. There is no tenderness. There is no rebound and no guarding. Musculoskeletal: She exhibits edema. She exhibits no deformity. Neurological: She is alert and oriented to person, place, and time. She displays normal reflexes. No cranial nerve deficit. She exhibits normal muscle tone. Coordination normal.   Skin: No rash noted. She is not diaphoretic. No erythema. No pallor. Health Maintenance   Topic Date Due    HIV screen  03/16/1984    Hepatitis B Vaccine (1 of 3 - Risk 3-dose series) 03/16/1988    Cervical cancer screen  03/16/1990    DTaP/Tdap/Td vaccine (1 - Tdap) 06/28/2010    Diabetic foot exam  11/30/2018    Diabetic microalbuminuria test  03/14/2019    Shingles Vaccine (1 of 2) 03/16/2019    Colon cancer screen colonoscopy  03/16/2019    Flu vaccine (1) 09/01/2019    A1C test (Diabetic or Prediabetic)  02/18/2020    Lipid screen  02/18/2020    TSH testing  05/30/2020    Breast cancer screen  06/19/2020    Potassium monitoring  07/23/2020    Creatinine monitoring  07/23/2020    Diabetic retinal exam  03/01/2021    Pneumococcal 0-64 years Vaccine  Completed          Assessment/Plan:    History of moderate persistent asthma on Dulera 200 and albuterol nebulizer as needed. Also on 3 L O2. Her main issue appears to be related to diastolic CHF and recurrent weight gain. Currently using metolazone-also on spironolactone and Ranexa. LUIS noncompliant with CPAP. Uses O2 at nighttime also. Symptoms of asthma stable. Return in about 6 months (around 2/5/2020).

## 2019-08-06 ENCOUNTER — HOSPITAL ENCOUNTER (OUTPATIENT)
Age: 49
Setting detail: SPECIMEN
Discharge: HOME | End: 2019-08-06
Payer: COMMERCIAL

## 2019-08-06 LAB
ANION GAP SERPL CALCULATED.3IONS-SCNC: 14 MMOL/L (ref 3–16)
BUN BLDV-MCNC: 26 MG/DL (ref 7–20)
CALCIUM SERPL-MCNC: 9.7 MG/DL (ref 8.3–10.6)
CHLORIDE BLD-SCNC: 87 MMOL/L (ref 99–110)
CO2: 31 MMOL/L (ref 21–32)
CREAT SERPL-MCNC: 1.3 MG/DL (ref 0.6–1.1)
GFR AFRICAN AMERICAN: 52
GFR NON-AFRICAN AMERICAN: 43
GLUCOSE BLD-MCNC: 264 MG/DL (ref 70–99)
POTASSIUM SERPL-SCNC: 4.6 MMOL/L (ref 3.5–5.1)
PRO-BNP: 307 PG/ML (ref 0–124)
SODIUM BLD-SCNC: 132 MMOL/L (ref 136–145)
URIC ACID, SERUM: 6 MG/DL (ref 2.6–6)

## 2019-08-06 PROCEDURE — 80048 BASIC METABOLIC PNL TOTAL CA: CPT

## 2019-08-06 PROCEDURE — 36415 COLL VENOUS BLD VENIPUNCTURE: CPT

## 2019-08-06 PROCEDURE — 84550 ASSAY OF BLOOD/URIC ACID: CPT

## 2019-08-06 PROCEDURE — 83880 ASSAY OF NATRIURETIC PEPTIDE: CPT

## 2019-08-13 ENCOUNTER — OFFICE VISIT (OUTPATIENT)
Dept: ENDOCRINOLOGY | Age: 49
End: 2019-08-13
Payer: COMMERCIAL

## 2019-08-13 VITALS
SYSTOLIC BLOOD PRESSURE: 116 MMHG | WEIGHT: 192 LBS | HEART RATE: 65 BPM | DIASTOLIC BLOOD PRESSURE: 70 MMHG | OXYGEN SATURATION: 96 % | BODY MASS INDEX: 32.96 KG/M2

## 2019-08-13 DIAGNOSIS — E03.8 HYPOTHYROIDISM DUE TO HASHIMOTO'S THYROIDITIS: ICD-10-CM

## 2019-08-13 DIAGNOSIS — E11.65 POORLY CONTROLLED TYPE 2 DIABETES MELLITUS (HCC): Primary | ICD-10-CM

## 2019-08-13 DIAGNOSIS — E06.3 HYPOTHYROIDISM DUE TO HASHIMOTO'S THYROIDITIS: ICD-10-CM

## 2019-08-13 PROCEDURE — 3017F COLORECTAL CA SCREEN DOC REV: CPT | Performed by: INTERNAL MEDICINE

## 2019-08-13 PROCEDURE — G8598 ASA/ANTIPLAT THER USED: HCPCS | Performed by: INTERNAL MEDICINE

## 2019-08-13 PROCEDURE — G8427 DOCREV CUR MEDS BY ELIG CLIN: HCPCS | Performed by: INTERNAL MEDICINE

## 2019-08-13 PROCEDURE — 83036 HEMOGLOBIN GLYCOSYLATED A1C: CPT | Performed by: INTERNAL MEDICINE

## 2019-08-13 PROCEDURE — G8417 CALC BMI ABV UP PARAM F/U: HCPCS | Performed by: INTERNAL MEDICINE

## 2019-08-13 PROCEDURE — 2022F DILAT RTA XM EVC RTNOPTHY: CPT | Performed by: INTERNAL MEDICINE

## 2019-08-13 PROCEDURE — 3045F PR MOST RECENT HEMOGLOBIN A1C LEVEL 7.0-9.0%: CPT | Performed by: INTERNAL MEDICINE

## 2019-08-13 PROCEDURE — 99214 OFFICE O/P EST MOD 30 MIN: CPT | Performed by: INTERNAL MEDICINE

## 2019-08-13 PROCEDURE — 1036F TOBACCO NON-USER: CPT | Performed by: INTERNAL MEDICINE

## 2019-08-13 NOTE — PROGRESS NOTES
obesity due to excess calories with body mass index (BMI) of 31.0 to 31.9 in adult    H/O TIA (transient ischemic attack) and stroke    Need for isolation    Encounter for medication counseling    Hesitancy of micturition    Weight loss counseling, encounter for    Complex care coordination    Oropharyngeal dysphagia    Constipation due to pain medication    Right hand pain    Fungal dermatitis    Mouth pain    Environmental and seasonal allergies    Hypersomnolence    Enlargement of submandibular gland    Jaw pain    Pain in both lower extremities    Vertigo    Fatigue    Edema of right lower extremity     Past Surgical History:   Procedure Laterality Date    BREAST SURGERY      left mastectomy    CARPAL TUNNEL RELEASE      Bilateral    FINGER CONTRACTURE SURGERY      HYSTERECTOMY      TONSILLECTOMY       Social History     Socioeconomic History    Marital status:      Spouse name: Marbin Santa Number of children: 3    Years of education: Not on file    Highest education level: Not on file   Occupational History    Occupation: disabled   Social Needs    Financial resource strain: Not on file    Food insecurity:     Worry: Not on file     Inability: Not on file   [email or web address] needs:     Medical: Not on file     Non-medical: Not on file   Tobacco Use    Smoking status: Never Smoker    Smokeless tobacco: Never Used   Substance and Sexual Activity    Alcohol use: No    Drug use: No    Sexual activity: Not on file   Lifestyle    Physical activity:     Days per week: Not on file     Minutes per session: Not on file    Stress: Not on file   Relationships    Social connections:     Talks on phone: Not on file     Gets together: Not on file     Attends [employer] service: Not on file     Active member of club or organization: Not on file     Attends meetings of clubs or organizations: Not on file     Relationship status: Not on file    Intimate partner violence:     Fear of current or ex partner: Not on file     Emotionally abused: Not on file     Physically abused: Not on file     Forced sexual activity: Not on file   Other Topics Concern    Not on file   Social History Narrative    Not on file     Family History   Problem Relation Age of Onset    Asthma Other     Cancer Other     Depression Other     Diabetes Other     Hypertension Other     High Cholesterol Other     Migraines Other     Heart Attack Father 72         of MI    High Blood Pressure Mother     Diabetes Mother      Current Outpatient Medications   Medication Sig Dispense Refill    Ertugliflozin L-PyroglutamicAc (STEGLATRO) 5 MG TABS Take one tab daily 30 tablet 5    Insulin Degludec (TRESIBA FLEXTOUCH) 200 UNIT/ML SOPN inject 150 units subcutaneously daily 15 mL 2    loratadine (CLARITIN) 10 MG tablet TAKE 1 TABLET BY MOUTH ONE TIME A DAY  30 tablet 5    SM SENNA-S 8.6-50 MG per tablet TAKE 2 TABLETS BY MOUTH TWO TIMES A DAY  120 tablet 5    SM SENNA-S 8.6-50 MG per tablet TAKE 2 TABLETS BY MOUTH TWO TIMES A DAY  120 tablet 0    loratadine (CLARITIN) 10 MG tablet TAKE 1 TABLET BY MOUTH ONE TIME A DAY  30 tablet 0    spironolactone (ALDACTONE) 50 MG tablet TAKE 2 TABLETS BY MOUTH IN THE MORNING AND THEN TAKE 1 TABLET BY MOUTH IN THE EVENING 90 tablet 0    tiZANidine (ZANAFLEX) 2 MG tablet Take 1 tablet by mouth every 8 hours as needed (pain and spasm) 30 tablet 1    levothyroxine (SYNTHROID) 150 MCG tablet Take 1 tablet by mouth every morning (before breakfast) 90 tablet 3    ranolazine (RANEXA) 500 MG extended release tablet Take 1 tablet by mouth 2 times daily 60 tablet 5    Insulin Degludec (TRESIBA FLEXTOUCH) 200 UNIT/ML SOPN inject 150 units subcutaneously daily 15 mL 2    Cholecalciferol (VITAMIN D) 2000 units TABS tablet Take 2,000 Units by mouth daily  5    omeprazole (PRILOSEC) 20 MG delayed release capsule Take 1 capsule by mouth 2 times daily 60 capsule 5    Plecanatide (TRULANCE) Anaphylaxis     allergic to ct scan dye, not the MRI    Basaglar Kwikpen [Insulin Glargine]      High blood sugar     Trazodone And Nefazodone Other (See Comments)     Makes patient feel very sluggish     Family Status   Relation Name Status    Other family h/o Alive    Father      Mother  Alive    Sister  Alive    Brother  Alive       Review of Systems  I have reviewed the review of system questionnaire filled by the patient .   Patient was advised to contact PCP for non endocrine signs and symptoms     OBJECTIVE:  /70   Pulse 65   Wt 192 lb (87.1 kg)   SpO2 96%   BMI 32.96 kg/m²    Wt Readings from Last 3 Encounters:   19 192 lb (87.1 kg)   19 195 lb (88.5 kg)   07/10/19 186 lb 12.8 oz (84.7 kg)       Constitutional: no acute distress, well appearing and well nourished  Psychiatric: oriented to person, place and time, judgement and insight and normal, recent and remote memory and intact and mood and affect are depressed   Skin: skin and subcutaneous tissue is normal without mass, normal turgor  Head and Face: examination of head and face revealed no abnormalities  Eyes: no lid or conjunctival swelling, erythema or discharge, Ears/Nose: external inspection of ears and nose revealed no abnormalities, hearing is grossly normal  Oropharynx/Mouth/Face: lips, tongue and gums are normal with no lesions, the voice quality was normal  Neck: neck is supple and symmetric, with midline trachea and no masses, thyroid is normal  Pulmonary: no increased work of breathing or signs of respiratory distress, lungs are clear to auscultation  Cardiovascular: normal heart rate and rhythm, normal S1 and S2, + edema     Musculoskeletal: normal gait and station and exam of the digits and nails are normal  Neurological: normal coordination and normal general cortical function      Lab Results   Component Value Date    LABA1C 8.8 2019    LABA1C 9.9 2019    LABA1C 7.6 2018 elevated at >800 will recheck unable to calculate she was advised to follow with nephrology     foot exam --- she saw a podiatrist who gave her steroid shot she goes for regular follow up with her --last visit in 2019    2. Hypothyroidism    Hypothyroidism TSH ) 0.01 >>0.9 >>2 > 1.2 >>2.3   she is on 125 mcg daily continue on same dose   ----she had thyroid hemiagensis left lobe absent   --She has been issues with swallowing recently had a CT scan of neck done which revealed normal thyroid normal submandibular and normal architecture. She was advised to see a ear nose and throat doctor but they were having issues with (according to the  the last  who was from the same country toward other community members about this patient's medical health)    3. Mixed hyperlipidemia  On statins   Last TG high and LDL was 115 in April 2018   Encouraged to work on      4. Hx of breast cancer  Follows with onc   On tamoxifen diet 3 modification which apparently will be stopped in 2019    5. Primary fibromyalgia  On Lyrica follows with Dr Ondina Knott     6. Malignant neoplasm of overlapping sites of left female breast, unspecified estrogen receptor status ([facility] [address]  Breast cancer s/p surgery and chemo 2008   She is on tamoxifen which according to pt gives her pain in her arms and hence she takes percocet     7. Essential hypertension  Controlled now   Continue with current regimen    8. Coronary artery disease involving native coronary artery of native heart without angina pectoris  Stable follows with Dr Hugo Negro     9. Lymphedema of upper extremity following lymphadenectomy  Stable     10. Dysthymia  Depression   She is on Latuda    Her dose has been adjusted   She appears to be alert and oriented to day but was very tearful    11. Asthma without complication  Stable    12.  Diabetic polyneuropathy associated with type 2 diabetes mellitus ([address]  Stable  She is on Lyrica and was on Cymbalta in the past       Rh

## 2019-08-15 ENCOUNTER — TELEPHONE (OUTPATIENT)
Dept: ENDOCRINOLOGY | Age: 49
End: 2019-08-15

## 2019-08-15 LAB — HBA1C MFR BLD: 8.5 %

## 2019-08-20 ENCOUNTER — HOSPITAL ENCOUNTER (OUTPATIENT)
Age: 49
Setting detail: SPECIMEN
Discharge: HOME | End: 2019-08-20
Payer: COMMERCIAL

## 2019-08-20 LAB
ANION GAP SERPL CALCULATED.3IONS-SCNC: 15 MMOL/L (ref 3–16)
BUN BLDV-MCNC: 60 MG/DL (ref 7–20)
CALCIUM SERPL-MCNC: 9.7 MG/DL (ref 8.3–10.6)
CHLORIDE BLD-SCNC: 91 MMOL/L (ref 99–110)
CO2: 27 MMOL/L (ref 21–32)
CREAT SERPL-MCNC: 1.6 MG/DL (ref 0.6–1.1)
GFR AFRICAN AMERICAN: 41
GFR NON-AFRICAN AMERICAN: 34
GLUCOSE BLD-MCNC: 329 MG/DL (ref 70–99)
POTASSIUM SERPL-SCNC: 4.5 MMOL/L (ref 3.5–5.1)
PRO-BNP: 106 PG/ML (ref 0–124)
SODIUM BLD-SCNC: 133 MMOL/L (ref 136–145)

## 2019-08-20 PROCEDURE — 80048 BASIC METABOLIC PNL TOTAL CA: CPT

## 2019-08-20 PROCEDURE — 83880 ASSAY OF NATRIURETIC PEPTIDE: CPT

## 2019-08-20 PROCEDURE — 36415 COLL VENOUS BLD VENIPUNCTURE: CPT

## 2019-08-20 RX ORDER — MONTELUKAST SODIUM 10 MG/1
TABLET ORAL
Qty: 30 TABLET | Refills: 4 | Status: SHIPPED | OUTPATIENT
Start: 2019-08-20 | End: 2020-02-20 | Stop reason: SDUPTHER

## 2019-08-20 NOTE — PROGRESS NOTES
DAY  7/22/19   Kevin Mcclure DO   loratadine (CLARITIN) 10 MG tablet TAKE 1 TABLET BY MOUTH ONE TIME A DAY  7/22/19   Kevin Mcclure DO   spironolactone (ALDACTONE) 50 MG tablet TAKE 2 TABLETS BY MOUTH IN THE MORNING AND THEN TAKE 1 TABLET BY MOUTH IN THE EVENING 7/22/19   Mer Gregory APRN - CNS   tiZANidine (ZANAFLEX) 2 MG tablet Take 1 tablet by mouth every 8 hours as needed (pain and spasm) 7/10/19   Kevin Mcclure DO   levothyroxine (SYNTHROID) 150 MCG tablet Take 1 tablet by mouth every morning (before breakfast) 6/26/19   Jabier Delgado MD   ranolazine (RANEXA) 500 MG extended release tablet Take 1 tablet by mouth 2 times daily 6/26/19   Elly Flaherty MD   Insulin Degludec (TRESIBA FLEXTOUCH) 200 UNIT/ML SOPN inject 150 units subcutaneously daily 6/24/19   Jabier Delgado MD   Cholecalciferol (VITAMIN D) 2000 units TABS tablet Take 2,000 Units by mouth daily 6/3/19   Historical Provider, MD   omeprazole (PRILOSEC) 20 MG delayed release capsule Take 1 capsule by mouth 2 times daily 5/31/19   Kevin Mcclure DO   Plecanatide (TRULANCE) 3 MG TABS Take 1 tablet by mouth daily 5/31/19   Kevin Mcclure DO   Blood Pressure KIT BP monitoring at home 5/29/19   HERMES Mo - CNS   ULORIC 40 MG TABS tablet Take 40 mg by mouth daily 5/8/19   Historical Provider, MD   Blood Glucose Monitoring Suppl (FREESTYLE LITE) MINDY Test blood sugar QID 5/7/19   Jabier Delgado MD   FREESTYLE LITE strip Test blood sugar QID 5/7/19   Jabier Delgado MD   torsemide (DEMADEX) 100 MG tablet TAKE 1 TABLET BY MOUTH IN THE MORNING AND ONE-HALF TABLET IN THE EVENING 4/25/19   Elly Flaherty MD   metolazone (ZAROXOLYN) 2.5 MG tablet TAKE 1 TABLET BY MOUTH ONE TIME A DAY AS NEEDED 4/25/19   Elly Flaherty MD   insulin aspart (NOVOLOG FLEXPEN) 100 UNIT/ML injection pen inject 30 units subcutaneously three times daily before meals 4/25/19   Jabier MD Danny   nitroGLYCERIN (NITROSTAT) 0.4 MG SL tablet Place 1 tablet under the tongue every 5 minutes as needed for Chest pain up to max of 3 total doses. If no relief after 1 dose, call 911. 4/10/19   MD IMANI Bautista UF III MINI PEN NEEDLES 31G X 5 MM MISC use five times daily with insulin 3/26/19   MD IMANI Gamboa UF III MINI PEN NEEDLES 31G X 5 MM MISC use five times daily with insulin 2/26/19   Harshil Morales MD   metoprolol tartrate (LOPRESSOR) 25 MG tablet Take 1 tablet by mouth 2 times daily 2/22/19   Fernando Chun MD   glipiZIDE (GLUCOTROL XL) 10 MG extended release tablet TAKE 1 TABLET BY MOUTH ONE TIME A DAY 2/12/19   Harshil Morales MD   empagliflozin (JARDIANCE) 10 MG tablet Take 1 tablet by mouth daily 2/12/19   Harshil Morales MD   ferrous sulfate 325 (65 Fe) MG tablet Take 1 tablet by mouth 2 times daily (with meals) 1/21/19   Wenceslao Siddiqi DO   polyethylene glycol [facility]) powder Take 17 g by mouth nightly 1/21/19   Wenceslao Siddiqi DO   colchicine (COLCRYS) 0.6 MG tablet Take 0.6 mg by mouth 2 times daily  1/8/19   Historical Provider, MD   simvastatin (ZOCOR) 20 MG tablet Take 1 tablet by mouth nightly 9/7/18   Fernando Chun MD   albuterol sulfate  (90 Base) MCG/ACT inhaler Inhale 2 puffs into the lungs every 6 hours as needed for Wheezing  Patient taking differently: Inhale 2 puffs into the lungs 2 times daily  4/16/18   Bobbi Cantrell MD   OXYGEN Inhale 2 L into the lungs nightly Sometimes with activity    Historical Provider, MD   oxyCODONE (OXYCONTIN) 20 MG extended release tablet Take 20 mg by mouth every 12 hours.     Historical Provider, MD   aspirin 81 MG EC tablet Take 81 mg by mouth daily    Historical Provider, MD   sulfaSALAzine (AZULFIDINE) 500 MG tablet Take 500 mg by mouth 2 times daily    Historical Provider, MD   calcium carbonate-vitamin D (CALCIUM 600+D) 600-200 MG-UNIT TABS Take 1 tablet by mouth 2 times daily    Historical Provider, MD   benztropine (COGENTIN) 1 MG tablet Take 1 mg by mouth nightly     Historical Provider, MD   Cholecalciferol (VITAMIN D) 2000 units CAPS capsule Take 1 capsule by mouth daily (with breakfast)    Historical Provider, MD   folic acid (FOLVITE) 1 MG tablet Take 1 mg by mouth daily    Historical Provider, MD   lurasidone (LATUDA) 40 MG TABS tablet Take 40 mg by mouth Daily with supper     Historical Provider, MD   leflunomide (ARAVA) 20 MG tablet Take 20 mg by mouth daily    Historical Provider, MD   albuterol (ACCUNEB) 1.25 MG/3ML nebulizer solution Inhale 1 ampule into the lungs every 6 hours as needed for Wheezing    Historical Provider, MD   mometasone-formoterol (DULERA) 200-5 MCG/ACT inhaler Inhale 2 puffs into the lungs every 12 hours    Historical Provider, MD   fluticasone (FLONASE) 50 MCG/ACT nasal spray 1 spray by Nasal route daily  Patient taking differently: 1 spray by Nasal route daily  10/17/17   Arsen Nix MD   oxyCODONE-acetaminophen (PERCOCET)  MG per tablet Take 1 tablet by mouth every 4 hours as needed for Pain . Earliest Fill Date: 6/8/17 6/8/17   Kyler Calhoun, HERMES - CNP   Elastic Bandages & Supports (FUTURO SUPPORT GLOVE MEDIUM) [address] [facility] 1 ready made support glove 5/19/16   Carlos Domingo MD   Compression Sleeve MISC by Does not apply route 1 sleeve, 20-30 mmHg 5/19/16   Carlos Domingo MD   ALPRAZolam leslie [facility]) 1 MG tablet Take 1 mg by mouth 2 times daily . Historical Provider, MD   LYRICA 50 MG capsule TAKE 1 CAPSULE BY MOUTH TWICE DAILY  Patient taking differently: 1 tab BID 2/9/13   Rebeca Castañeda MD   duloxetine (CYMBALTA) 60 MG capsule Take 60 mg by mouth 2 times daily. Historical Provider, MD        Allergies: Iv dye [iodides]; Anjum Dunk [insulin glargine]; and Trazodone and nefazodone     ROS:   Review of Systems   Constitutional: Positive for fatigue. Respiratory: Positive for cough. Cardiovascular: Positive for chest pain, palpitations and leg swelling. Gastrointestinal: Negative. Genitourinary: Negative.     Musculoskeletal: Negative. Skin: Negative. Neurological: Negative. Hematological: Negative. Psychiatric/Behavioral: Negative. Physical Examination:    Vitals:    08/21/19 1222   BP: 112/74   Site: Right Upper Arm   Position: Sitting   Cuff Size: Medium Adult   Pulse: 74   SpO2: 95%   Weight: 180 lb (81.6 kg)   Height: 5' 4\" (1.626 m)           Physical Exam   Constitutional: She is oriented to person, place, and time. She appears well-developed and well-nourished. HENT:   Head: Normocephalic and atraumatic. Eyes: Pupils are equal, round, and reactive to light. EOM are normal.   Neck: Normal range of motion. Neck supple. Cardiovascular: Normal rate, regular rhythm, normal heart sounds and intact distal pulses. Pulmonary/Chest: Effort normal and breath sounds normal.   Abdominal: Soft. Musculoskeletal: Normal range of motion. She exhibits edema. Both hands, L>R   Neurological: She is alert and oriented to person, place, and time. Skin: Skin is warm and dry. Psychiatric: She has a normal mood and affect.  Her behavior is normal. Judgment and thought content normal.       Lab Data:    CBC:   Lab Results   Component Value Date    WBC 6.7 02/18/2019    WBC 6.6 01/17/2019    WBC 8.9 09/11/2018    RBC 3.83 02/18/2019    RBC 3.97 01/17/2019    RBC 4.31 09/11/2018    RBC 3.57 05/16/2017    RBC 3.99 02/07/2017    RBC 3.70 11/08/2016    HGB 11.1 02/18/2019    HGB 11.2 01/17/2019    HGB 12.3 09/11/2018    HCT 34.0 02/18/2019    HCT 34.8 01/17/2019    HCT 37.7 09/11/2018    MCV 88.8 02/18/2019    MCV 87.7 01/17/2019    MCV 87.5 09/11/2018    RDW 14.0 02/18/2019    RDW 13.1 01/17/2019    RDW 13.2 09/11/2018     02/18/2019     01/17/2019     09/11/2018     BMP:  Lab Results   Component Value Date     08/06/2019     07/23/2019     07/10/2019    K 4.6 08/06/2019    K 5.2 07/23/2019    K 4.8 07/10/2019    K 3.8 09/11/2018    K 4.3 07/19/2018    K 4.0 03/16/2018    CL 87 08/06/2019    CL 90 07/23/2019    CL 98 07/10/2019    CO2 31 08/06/2019    CO2 29 07/23/2019    CO2 31 07/10/2019    PHOS 3.4 02/07/2019    PHOS 3.1 08/11/2018    PHOS 3.2 03/25/2018    BUN 26 08/06/2019    BUN 33 07/23/2019    BUN 27 07/10/2019    CREATININE 1.3 08/06/2019    CREATININE 1.2 07/23/2019    CREATININE 1.1 07/10/2019     BNP:   Lab Results   Component Value Date    PROBNP 307 08/06/2019    PROBNP 116 07/23/2019    PROBNP 160 07/10/2019         Assessment/Plan:    1. (HFpEF) heart failure with preserved ejection fraction (HCC) - compensated, no change in meds, labs every 2 weeks   2. Coronary artery disease involving native coronary artery of native heart without angina pectoris - stable, on GDT         Instructions:   1. Medications: continue current medications  2. Labs: as ordered every 2 weeks  3. Follow up: 3 months with Dr. Pam Delgadillo: [phone]      I appreciate the opportunity of cooperating in the care of this individual.    Kaye De Los Santos CNP, 8/20/2019,3:03 PM    QUALITY MEASURES  1. Tobacco Cessation Counseling: NA  2. Retake of BP if >140/90:   NA  3. Documentation to PCP/referring for new patient:  Sent to PCP at close of office visit  4. CAD patient on anti-platelet: Yes  5. CAD patient on STATIN therapy:  No  6. Patient with CHF and aFib on anticoagulation:  NA   7. Patient Education:Yes   8. BB for LVSD :  NA   9. ACE/ARB for LVSD:  NA   10.  Left Ventricular Ejection Fraction (LVEF) Assessment:  Yes

## 2019-08-21 ENCOUNTER — OFFICE VISIT (OUTPATIENT)
Dept: CARDIOLOGY CLINIC | Age: 49
End: 2019-08-21
Payer: COMMERCIAL

## 2019-08-21 VITALS
DIASTOLIC BLOOD PRESSURE: 74 MMHG | HEIGHT: 64 IN | SYSTOLIC BLOOD PRESSURE: 112 MMHG | BODY MASS INDEX: 30.73 KG/M2 | WEIGHT: 180 LBS | HEART RATE: 74 BPM | OXYGEN SATURATION: 95 %

## 2019-08-21 DIAGNOSIS — I50.32 CHRONIC DIASTOLIC CONGESTIVE HEART FAILURE (HCC): Primary | ICD-10-CM

## 2019-08-21 DIAGNOSIS — I25.10 CORONARY ARTERY DISEASE INVOLVING NATIVE CORONARY ARTERY OF NATIVE HEART WITHOUT ANGINA PECTORIS: Chronic | ICD-10-CM

## 2019-08-21 PROCEDURE — G8417 CALC BMI ABV UP PARAM F/U: HCPCS | Performed by: NURSE PRACTITIONER

## 2019-08-21 PROCEDURE — G8427 DOCREV CUR MEDS BY ELIG CLIN: HCPCS | Performed by: NURSE PRACTITIONER

## 2019-08-21 PROCEDURE — 1036F TOBACCO NON-USER: CPT | Performed by: NURSE PRACTITIONER

## 2019-08-21 PROCEDURE — G8598 ASA/ANTIPLAT THER USED: HCPCS | Performed by: NURSE PRACTITIONER

## 2019-08-21 PROCEDURE — 3017F COLORECTAL CA SCREEN DOC REV: CPT | Performed by: NURSE PRACTITIONER

## 2019-08-21 PROCEDURE — 99213 OFFICE O/P EST LOW 20 MIN: CPT | Performed by: NURSE PRACTITIONER

## 2019-08-21 ASSESSMENT — ENCOUNTER SYMPTOMS
GASTROINTESTINAL NEGATIVE: 1
COUGH: 1

## 2019-08-21 NOTE — PATIENT INSTRUCTIONS
Instructions:   1. Medications: continue current medications  2. Labs: as ordered every 2 weeks  3.  Follow up: 3 months with Dr. Jennifer Sebastian: [phone]

## 2019-08-23 ENCOUNTER — TELEPHONE (OUTPATIENT)
Dept: CARDIOLOGY CLINIC | Age: 49
End: 2019-08-23

## 2019-08-23 DIAGNOSIS — I25.83 CORONARY ARTERY DISEASE DUE TO LIPID RICH PLAQUE: Chronic | ICD-10-CM

## 2019-08-23 DIAGNOSIS — N28.9 RENAL INSUFFICIENCY: Chronic | ICD-10-CM

## 2019-08-23 DIAGNOSIS — I25.10 CORONARY ARTERY DISEASE INVOLVING NATIVE CORONARY ARTERY OF NATIVE HEART WITHOUT ANGINA PECTORIS: Chronic | ICD-10-CM

## 2019-08-23 DIAGNOSIS — R06.02 SOB (SHORTNESS OF BREATH): Chronic | ICD-10-CM

## 2019-08-23 DIAGNOSIS — I42.9 CARDIOMYOPATHY, UNSPECIFIED TYPE (HCC): Chronic | ICD-10-CM

## 2019-08-23 DIAGNOSIS — I50.30 (HFPEF) HEART FAILURE WITH PRESERVED EJECTION FRACTION (HCC): Chronic | ICD-10-CM

## 2019-08-23 DIAGNOSIS — I50.22 SYSTOLIC CHF, CHRONIC (HCC): ICD-10-CM

## 2019-08-23 DIAGNOSIS — I25.10 CORONARY ARTERY DISEASE DUE TO LIPID RICH PLAQUE: Chronic | ICD-10-CM

## 2019-08-23 DIAGNOSIS — I10 ESSENTIAL HYPERTENSION: ICD-10-CM

## 2019-08-23 RX ORDER — SPIRONOLACTONE 50 MG/1
TABLET, FILM COATED ORAL
Qty: 90 TABLET | Refills: 0 | Status: SHIPPED | OUTPATIENT
Start: 2019-08-23 | End: 2019-09-22 | Stop reason: SDUPTHER

## 2019-08-23 RX ORDER — METOPROLOL TARTRATE 50 MG/1
TABLET, FILM COATED ORAL
Qty: 60 TABLET | Refills: 10 | OUTPATIENT
Start: 2019-08-23

## 2019-08-23 NOTE — TELEPHONE ENCOUNTER
Called and spoke and informed him of message below. He states that she has an Oncology appt on next week due to abnormal labs. He will call is on next week to let us know the final findings.

## 2019-08-23 NOTE — TELEPHONE ENCOUNTER
----- Message from Tali De Luna, HERMES - CNP sent at 8/22/2019  2:45 PM EDT -----  Kidney numbers up a little, decrease metolazone to once a week - I think she has been taking it twice a week.  Thanks, Rod Paz

## 2019-08-26 ENCOUNTER — TELEPHONE (OUTPATIENT)
Dept: ENDOCRINOLOGY | Age: 49
End: 2019-08-26

## 2019-08-26 DIAGNOSIS — I25.10 CORONARY ARTERY DISEASE INVOLVING NATIVE CORONARY ARTERY OF NATIVE HEART WITHOUT ANGINA PECTORIS: ICD-10-CM

## 2019-08-28 RX ORDER — GLIPIZIDE 10 MG/1
TABLET, FILM COATED, EXTENDED RELEASE ORAL
Qty: 60 TABLET | Refills: 5 | Status: SHIPPED | OUTPATIENT
Start: 2019-08-28 | End: 2020-02-26

## 2019-08-29 ENCOUNTER — TELEPHONE (OUTPATIENT)
Dept: CARDIOLOGY CLINIC | Age: 49
End: 2019-08-29

## 2019-09-03 ENCOUNTER — HOSPITAL ENCOUNTER (OUTPATIENT)
Age: 49
Setting detail: SPECIMEN
Discharge: HOME | End: 2019-09-03
Payer: COMMERCIAL

## 2019-09-03 LAB
ANION GAP SERPL CALCULATED.3IONS-SCNC: 12 MMOL/L (ref 3–16)
BUN BLDV-MCNC: 21 MG/DL (ref 7–20)
CALCIUM SERPL-MCNC: 10.7 MG/DL (ref 8.3–10.6)
CHLORIDE BLD-SCNC: 96 MMOL/L (ref 99–110)
CO2: 33 MMOL/L (ref 21–32)
CREAT SERPL-MCNC: 1.3 MG/DL (ref 0.6–1.1)
GFR AFRICAN AMERICAN: 52
GFR NON-AFRICAN AMERICAN: 43
GLUCOSE BLD-MCNC: 101 MG/DL (ref 70–99)
POTASSIUM SERPL-SCNC: 3.8 MMOL/L (ref 3.5–5.1)
PRO-BNP: 204 PG/ML (ref 0–124)
SODIUM BLD-SCNC: 141 MMOL/L (ref 136–145)

## 2019-09-03 PROCEDURE — 83880 ASSAY OF NATRIURETIC PEPTIDE: CPT

## 2019-09-03 PROCEDURE — 80048 BASIC METABOLIC PNL TOTAL CA: CPT

## 2019-09-03 PROCEDURE — 36415 COLL VENOUS BLD VENIPUNCTURE: CPT

## 2019-09-11 ENCOUNTER — TELEPHONE (OUTPATIENT)
Dept: CARDIOLOGY | Age: 49
End: 2019-09-11

## 2019-09-11 NOTE — TELEPHONE ENCOUNTER
I called Laban Soulier and relayed NPKV's comments.   She stated she had already spoken with [facility] [place]

## 2019-09-12 ENCOUNTER — TELEPHONE (OUTPATIENT)
Dept: CARDIOLOGY CLINIC | Age: 49
End: 2019-09-12

## 2019-09-18 ENCOUNTER — HOSPITAL ENCOUNTER (OUTPATIENT)
Age: 49
Setting detail: SPECIMEN
Discharge: HOME | End: 2019-09-18
Payer: COMMERCIAL

## 2019-09-18 LAB
ALBUMIN SERPL-MCNC: 4.7 G/DL (ref 3.4–5)
ALP BLD-CCNC: 112 U/L (ref 40–129)
ALT SERPL-CCNC: 40 U/L (ref 10–40)
ANION GAP SERPL CALCULATED.3IONS-SCNC: 15 MMOL/L (ref 3–16)
AST SERPL-CCNC: 39 U/L (ref 15–37)
BASOPHILS ABSOLUTE: 0.1 K/UL (ref 0–0.2)
BASOPHILS RELATIVE PERCENT: 0.7 %
BILIRUB SERPL-MCNC: <0.2 MG/DL (ref 0–1)
BILIRUBIN DIRECT: <0.2 MG/DL (ref 0–0.3)
BILIRUBIN, INDIRECT: ABNORMAL MG/DL (ref 0–1)
BUN BLDV-MCNC: 49 MG/DL (ref 7–20)
CALCIUM SERPL-MCNC: 9.8 MG/DL (ref 8.3–10.6)
CHLORIDE BLD-SCNC: 92 MMOL/L (ref 99–110)
CO2: 29 MMOL/L (ref 21–32)
CREAT SERPL-MCNC: 1.9 MG/DL (ref 0.6–1.1)
EOSINOPHILS ABSOLUTE: 0.2 K/UL (ref 0–0.6)
EOSINOPHILS RELATIVE PERCENT: 1.9 %
GFR AFRICAN AMERICAN: 34
GFR NON-AFRICAN AMERICAN: 28
GLUCOSE BLD-MCNC: 96 MG/DL (ref 70–99)
HCT VFR BLD CALC: 38.6 % (ref 36–48)
HEMOGLOBIN: 12.6 G/DL (ref 12–16)
LYMPHOCYTES ABSOLUTE: 2.8 K/UL (ref 1–5.1)
LYMPHOCYTES RELATIVE PERCENT: 29.7 %
MCH RBC QN AUTO: 29.2 PG (ref 26–34)
MCHC RBC AUTO-ENTMCNC: 32.5 G/DL (ref 31–36)
MCV RBC AUTO: 89.9 FL (ref 80–100)
MONOCYTES ABSOLUTE: 1 K/UL (ref 0–1.3)
MONOCYTES RELATIVE PERCENT: 10.2 %
NEUTROPHILS ABSOLUTE: 5.4 K/UL (ref 1.7–7.7)
NEUTROPHILS RELATIVE PERCENT: 57.5 %
PDW BLD-RTO: 14.7 % (ref 12.4–15.4)
PLATELET # BLD: 249 K/UL (ref 135–450)
PMV BLD AUTO: 10.5 FL (ref 5–10.5)
POTASSIUM SERPL-SCNC: 4.5 MMOL/L (ref 3.5–5.1)
PRO-BNP: 140 PG/ML (ref 0–124)
RBC # BLD: 4.3 M/UL (ref 4–5.2)
SEDIMENTATION RATE, ERYTHROCYTE: 102 MM/HR (ref 0–30)
SODIUM BLD-SCNC: 136 MMOL/L (ref 136–145)
TOTAL PROTEIN: 8.9 G/DL (ref 6.4–8.2)
URIC ACID, SERUM: 5.4 MG/DL (ref 2.6–6)
WBC # BLD: 9.5 K/UL (ref 4–11)

## 2019-09-18 PROCEDURE — 80076 HEPATIC FUNCTION PANEL: CPT

## 2019-09-18 PROCEDURE — 85652 RBC SED RATE AUTOMATED: CPT

## 2019-09-18 PROCEDURE — 84550 ASSAY OF BLOOD/URIC ACID: CPT

## 2019-09-18 PROCEDURE — 36415 COLL VENOUS BLD VENIPUNCTURE: CPT

## 2019-09-18 PROCEDURE — 80048 BASIC METABOLIC PNL TOTAL CA: CPT

## 2019-09-18 PROCEDURE — 85025 COMPLETE CBC W/AUTO DIFF WBC: CPT

## 2019-09-18 PROCEDURE — 83880 ASSAY OF NATRIURETIC PEPTIDE: CPT

## 2019-09-18 PROCEDURE — 86140 C-REACTIVE PROTEIN: CPT

## 2019-09-19 LAB — C-REACTIVE PROTEIN: 18.8 MG/L (ref 0–5.1)

## 2019-09-22 DIAGNOSIS — K59.03 CONSTIPATION DUE TO PAIN MEDICATION: ICD-10-CM

## 2019-09-22 DIAGNOSIS — I42.9 CARDIOMYOPATHY, UNSPECIFIED TYPE (HCC): Chronic | ICD-10-CM

## 2019-09-22 DIAGNOSIS — I25.10 CORONARY ARTERY DISEASE INVOLVING NATIVE CORONARY ARTERY OF NATIVE HEART WITHOUT ANGINA PECTORIS: Chronic | ICD-10-CM

## 2019-09-22 DIAGNOSIS — I25.10 CORONARY ARTERY DISEASE DUE TO LIPID RICH PLAQUE: Chronic | ICD-10-CM

## 2019-09-22 DIAGNOSIS — I50.22 SYSTOLIC CHF, CHRONIC (HCC): ICD-10-CM

## 2019-09-22 DIAGNOSIS — R06.02 SOB (SHORTNESS OF BREATH): Chronic | ICD-10-CM

## 2019-09-22 DIAGNOSIS — N28.9 RENAL INSUFFICIENCY: Chronic | ICD-10-CM

## 2019-09-22 DIAGNOSIS — I25.83 CORONARY ARTERY DISEASE DUE TO LIPID RICH PLAQUE: Chronic | ICD-10-CM

## 2019-09-22 DIAGNOSIS — I50.30 (HFPEF) HEART FAILURE WITH PRESERVED EJECTION FRACTION (HCC): Chronic | ICD-10-CM

## 2019-09-22 DIAGNOSIS — I10 ESSENTIAL HYPERTENSION: ICD-10-CM

## 2019-09-23 ENCOUNTER — TELEPHONE (OUTPATIENT)
Dept: CARDIOLOGY CLINIC | Age: 49
End: 2019-09-23

## 2019-09-23 RX ORDER — SPIRONOLACTONE 50 MG/1
TABLET, FILM COATED ORAL
Qty: 90 TABLET | Refills: 1 | Status: SHIPPED | OUTPATIENT
Start: 2019-09-23 | End: 2019-10-22

## 2019-09-23 RX ORDER — TORSEMIDE 100 MG/1
TABLET ORAL
Qty: 45 TABLET | Refills: 3 | Status: SHIPPED | OUTPATIENT
Start: 2019-09-23 | End: 2019-12-24

## 2019-09-23 RX ORDER — SENNOSIDES AND DOCUSATE SODIUM 8.6; 5 MG/1; MG/1
TABLET, FILM COATED ORAL
Qty: 120 TABLET | Refills: 0 | Status: SHIPPED | OUTPATIENT
Start: 2019-09-23 | End: 2019-10-22 | Stop reason: SDUPTHER

## 2019-09-23 RX ORDER — PEN NEEDLE, DIABETIC 31 GX5/16"
NEEDLE, DISPOSABLE MISCELLANEOUS
Qty: 200 EACH | Refills: 2 | Status: SHIPPED | OUTPATIENT
Start: 2019-09-23 | End: 2020-03-25 | Stop reason: ALTCHOICE

## 2019-09-23 RX ORDER — SIMVASTATIN 20 MG
20 TABLET ORAL NIGHTLY
Qty: 90 TABLET | Refills: 3 | Status: SHIPPED | OUTPATIENT
Start: 2019-09-23 | End: 2020-10-05

## 2019-09-23 RX ORDER — MELATONIN
Qty: 90 TABLET | Refills: 2 | Status: SHIPPED | OUTPATIENT
Start: 2019-09-23 | End: 2019-12-23

## 2019-09-24 ENCOUNTER — TELEPHONE (OUTPATIENT)
Dept: CARDIOLOGY CLINIC | Age: 49
End: 2019-09-24

## 2019-09-24 NOTE — TELEPHONE ENCOUNTER
Pt has had a 5 lb weight gain overnight. Monday weight was 180.6 and today it is 185.2, she has a slight increase in sob and not much more swelling in her feet/ankles. She has only taken her water meds on Sun & Mon as instructed. Please call with any questions. Thank you.

## 2019-09-26 ENCOUNTER — TELEPHONE (OUTPATIENT)
Dept: CARDIOLOGY CLINIC | Age: 49
End: 2019-09-26

## 2019-09-26 NOTE — TELEPHONE ENCOUNTER
----- Message from Lisa Adair MD sent at 9/26/2019  5:04 PM EDT -----  Call pt. Her kidneys look dry. I would not push the metolazone as hard. She may have gained body weight. Also her imflammation markers are elevated. I don't know who ordered these under my name. We need to make sure the CRP and ESR get to the proper physician to address these.   ARETHA

## 2019-09-27 ENCOUNTER — TELEPHONE (OUTPATIENT)
Dept: ENDOCRINOLOGY | Age: 49
End: 2019-09-27

## 2019-10-01 ENCOUNTER — HOSPITAL ENCOUNTER (OUTPATIENT)
Age: 49
Setting detail: SPECIMEN
Discharge: HOME | End: 2019-10-01
Payer: COMMERCIAL

## 2019-10-01 LAB
ANION GAP SERPL CALCULATED.3IONS-SCNC: 10 MMOL/L (ref 3–16)
BUN BLDV-MCNC: 38 MG/DL (ref 7–20)
CALCIUM SERPL-MCNC: 9.4 MG/DL (ref 8.3–10.6)
CHLORIDE BLD-SCNC: 101 MMOL/L (ref 99–110)
CO2: 30 MMOL/L (ref 21–32)
CREAT SERPL-MCNC: 1.9 MG/DL (ref 0.6–1.1)
GFR AFRICAN AMERICAN: 34
GFR NON-AFRICAN AMERICAN: 28
GLUCOSE BLD-MCNC: 171 MG/DL (ref 70–99)
POTASSIUM SERPL-SCNC: 4.6 MMOL/L (ref 3.5–5.1)
PRO-BNP: 70 PG/ML (ref 0–124)
SODIUM BLD-SCNC: 141 MMOL/L (ref 136–145)

## 2019-10-01 PROCEDURE — 80048 BASIC METABOLIC PNL TOTAL CA: CPT

## 2019-10-01 PROCEDURE — 36415 COLL VENOUS BLD VENIPUNCTURE: CPT

## 2019-10-01 PROCEDURE — 83880 ASSAY OF NATRIURETIC PEPTIDE: CPT

## 2019-10-02 ENCOUNTER — TELEPHONE (OUTPATIENT)
Dept: CARDIOLOGY CLINIC | Age: 49
End: 2019-10-02

## 2019-10-07 ENCOUNTER — TELEPHONE (OUTPATIENT)
Dept: CARDIOLOGY CLINIC | Age: 49
End: 2019-10-07

## 2019-10-07 DIAGNOSIS — I50.30 HEART FAILURE WITH PRESERVED EJECTION FRACTION, UNSPECIFIED HF CHRONICITY (HCC): Primary | ICD-10-CM

## 2019-10-07 DIAGNOSIS — I25.10 CORONARY ARTERY DISEASE INVOLVING NATIVE CORONARY ARTERY OF NATIVE HEART WITHOUT ANGINA PECTORIS: ICD-10-CM

## 2019-10-07 DIAGNOSIS — I10 ESSENTIAL HYPERTENSION: ICD-10-CM

## 2019-10-08 ENCOUNTER — TELEPHONE (OUTPATIENT)
Dept: CARDIOLOGY CLINIC | Age: 49
End: 2019-10-08

## 2019-10-10 ENCOUNTER — TELEPHONE (OUTPATIENT)
Dept: CARDIOLOGY CLINIC | Age: 49
End: 2019-10-10

## 2019-10-10 ENCOUNTER — HOSPITAL ENCOUNTER (OUTPATIENT)
Age: 49
Setting detail: SPECIMEN
Discharge: HOME | End: 2019-10-10
Payer: COMMERCIAL

## 2019-10-10 LAB
ANION GAP SERPL CALCULATED.3IONS-SCNC: 10 MMOL/L (ref 3–16)
BUN BLDV-MCNC: 38 MG/DL (ref 7–20)
CALCIUM SERPL-MCNC: 9.4 MG/DL (ref 8.3–10.6)
CHLORIDE BLD-SCNC: 99 MMOL/L (ref 99–110)
CO2: 31 MMOL/L (ref 21–32)
CREAT SERPL-MCNC: 1.4 MG/DL (ref 0.6–1.1)
GFR AFRICAN AMERICAN: 48
GFR NON-AFRICAN AMERICAN: 40
GLUCOSE BLD-MCNC: 122 MG/DL (ref 70–99)
POTASSIUM SERPL-SCNC: 4.3 MMOL/L (ref 3.5–5.1)
PRO-BNP: 252 PG/ML (ref 0–124)
SODIUM BLD-SCNC: 140 MMOL/L (ref 136–145)

## 2019-10-10 PROCEDURE — 80048 BASIC METABOLIC PNL TOTAL CA: CPT

## 2019-10-10 PROCEDURE — 83880 ASSAY OF NATRIURETIC PEPTIDE: CPT

## 2019-10-10 PROCEDURE — 36415 COLL VENOUS BLD VENIPUNCTURE: CPT

## 2019-10-11 ENCOUNTER — TELEPHONE (OUTPATIENT)
Dept: CARDIOLOGY CLINIC | Age: 49
End: 2019-10-11

## 2019-10-12 DIAGNOSIS — M62.838 NECK MUSCLE SPASM: ICD-10-CM

## 2019-10-14 ENCOUNTER — TELEPHONE (OUTPATIENT)
Dept: CARDIOLOGY CLINIC | Age: 49
End: 2019-10-14

## 2019-10-14 RX ORDER — TIZANIDINE 2 MG/1
TABLET ORAL
Qty: 30 TABLET | Refills: 0 | Status: SHIPPED | OUTPATIENT
Start: 2019-10-14 | End: 2019-10-21 | Stop reason: SDUPTHER

## 2019-10-16 ENCOUNTER — HOSPITAL ENCOUNTER (OUTPATIENT)
Age: 49
Setting detail: SPECIMEN
Discharge: HOME | End: 2019-10-16
Payer: COMMERCIAL

## 2019-10-16 LAB
ANION GAP SERPL CALCULATED.3IONS-SCNC: 17 MMOL/L (ref 3–16)
BUN BLDV-MCNC: 34 MG/DL (ref 7–20)
CALCIUM SERPL-MCNC: 10.2 MG/DL (ref 8.3–10.6)
CHLORIDE BLD-SCNC: 94 MMOL/L (ref 99–110)
CO2: 25 MMOL/L (ref 21–32)
CREAT SERPL-MCNC: 1.4 MG/DL (ref 0.6–1.1)
GFR AFRICAN AMERICAN: 48
GFR NON-AFRICAN AMERICAN: 40
GLUCOSE BLD-MCNC: 53 MG/DL (ref 70–99)
POTASSIUM SERPL-SCNC: 4.6 MMOL/L (ref 3.5–5.1)
PRO-BNP: 181 PG/ML (ref 0–124)
SODIUM BLD-SCNC: 136 MMOL/L (ref 136–145)
URIC ACID, SERUM: 5.6 MG/DL (ref 2.6–6)

## 2019-10-16 PROCEDURE — 83880 ASSAY OF NATRIURETIC PEPTIDE: CPT

## 2019-10-16 PROCEDURE — 80048 BASIC METABOLIC PNL TOTAL CA: CPT

## 2019-10-16 PROCEDURE — 84550 ASSAY OF BLOOD/URIC ACID: CPT

## 2019-10-16 PROCEDURE — 36415 COLL VENOUS BLD VENIPUNCTURE: CPT

## 2019-10-18 ENCOUNTER — TELEPHONE (OUTPATIENT)
Dept: CARDIOLOGY CLINIC | Age: 49
End: 2019-10-18

## 2019-10-21 ENCOUNTER — OFFICE VISIT (OUTPATIENT)
Dept: INTERNAL MEDICINE CLINIC | Age: 49
End: 2019-10-21
Payer: COMMERCIAL

## 2019-10-21 VITALS
WEIGHT: 186.2 LBS | HEART RATE: 88 BPM | OXYGEN SATURATION: 98 % | BODY MASS INDEX: 31.96 KG/M2 | DIASTOLIC BLOOD PRESSURE: 82 MMHG | SYSTOLIC BLOOD PRESSURE: 104 MMHG

## 2019-10-21 DIAGNOSIS — R23.2 HOT FLASHES: ICD-10-CM

## 2019-10-21 DIAGNOSIS — G89.29 OTHER CHRONIC PAIN: Chronic | ICD-10-CM

## 2019-10-21 DIAGNOSIS — M06.9 RHEUMATOID ARTHRITIS INVOLVING MULTIPLE SITES, UNSPECIFIED RHEUMATOID FACTOR PRESENCE: ICD-10-CM

## 2019-10-21 DIAGNOSIS — R13.12 OROPHARYNGEAL DYSPHAGIA: ICD-10-CM

## 2019-10-21 DIAGNOSIS — R23.2 HOT FLASHES: Primary | ICD-10-CM

## 2019-10-21 DIAGNOSIS — Z60.3 IMMIGRANT WITH LANGUAGE DIFFICULTY: ICD-10-CM

## 2019-10-21 DIAGNOSIS — M62.838 NECK MUSCLE SPASM: ICD-10-CM

## 2019-10-21 DIAGNOSIS — F41.9 ANXIETY AND DEPRESSION: ICD-10-CM

## 2019-10-21 DIAGNOSIS — F32.A ANXIETY AND DEPRESSION: ICD-10-CM

## 2019-10-21 DIAGNOSIS — K59.03 CONSTIPATION DUE TO PAIN MEDICATION: ICD-10-CM

## 2019-10-21 LAB
ALBUMIN SERPL-MCNC: 4.8 G/DL (ref 3.4–5)
ALP BLD-CCNC: 118 U/L (ref 40–129)
ALT SERPL-CCNC: 26 U/L (ref 10–40)
AST SERPL-CCNC: 32 U/L (ref 15–37)
BASOPHILS ABSOLUTE: 0 K/UL (ref 0–0.2)
BASOPHILS RELATIVE PERCENT: 0.5 %
BILIRUB SERPL-MCNC: <0.2 MG/DL (ref 0–1)
BILIRUBIN DIRECT: <0.2 MG/DL (ref 0–0.3)
BILIRUBIN, INDIRECT: ABNORMAL MG/DL (ref 0–1)
EOSINOPHILS ABSOLUTE: 0.1 K/UL (ref 0–0.6)
EOSINOPHILS RELATIVE PERCENT: 1.4 %
HCT VFR BLD CALC: 37.9 % (ref 36–48)
HEMOGLOBIN: 12.4 G/DL (ref 12–16)
LYMPHOCYTES ABSOLUTE: 2.8 K/UL (ref 1–5.1)
LYMPHOCYTES RELATIVE PERCENT: 26.2 %
MCH RBC QN AUTO: 30.4 PG (ref 26–34)
MCHC RBC AUTO-ENTMCNC: 32.8 G/DL (ref 31–36)
MCV RBC AUTO: 92.7 FL (ref 80–100)
MONOCYTES ABSOLUTE: 0.7 K/UL (ref 0–1.3)
MONOCYTES RELATIVE PERCENT: 6.2 %
NEUTROPHILS ABSOLUTE: 6.9 K/UL (ref 1.7–7.7)
NEUTROPHILS RELATIVE PERCENT: 65.7 %
PDW BLD-RTO: 14.1 % (ref 12.4–15.4)
PLATELET # BLD: 366 K/UL (ref 135–450)
PMV BLD AUTO: 10 FL (ref 5–10.5)
RBC # BLD: 4.08 M/UL (ref 4–5.2)
TOTAL PROTEIN: 8.9 G/DL (ref 6.4–8.2)
WBC # BLD: 10.6 K/UL (ref 4–11)

## 2019-10-21 PROCEDURE — G8427 DOCREV CUR MEDS BY ELIG CLIN: HCPCS | Performed by: INTERNAL MEDICINE

## 2019-10-21 PROCEDURE — G8417 CALC BMI ABV UP PARAM F/U: HCPCS | Performed by: INTERNAL MEDICINE

## 2019-10-21 PROCEDURE — 99215 OFFICE O/P EST HI 40 MIN: CPT | Performed by: INTERNAL MEDICINE

## 2019-10-21 PROCEDURE — 1036F TOBACCO NON-USER: CPT | Performed by: INTERNAL MEDICINE

## 2019-10-21 PROCEDURE — G8598 ASA/ANTIPLAT THER USED: HCPCS | Performed by: INTERNAL MEDICINE

## 2019-10-21 PROCEDURE — 90686 IIV4 VACC NO PRSV 0.5 ML IM: CPT | Performed by: INTERNAL MEDICINE

## 2019-10-21 PROCEDURE — 3017F COLORECTAL CA SCREEN DOC REV: CPT | Performed by: INTERNAL MEDICINE

## 2019-10-21 PROCEDURE — 90471 IMMUNIZATION ADMIN: CPT | Performed by: INTERNAL MEDICINE

## 2019-10-21 PROCEDURE — G8482 FLU IMMUNIZE ORDER/ADMIN: HCPCS | Performed by: INTERNAL MEDICINE

## 2019-10-21 RX ORDER — LEFLUNOMIDE 10 MG/1
10 TABLET ORAL DAILY
COMMUNITY
Start: 2019-09-23 | End: 2021-08-10 | Stop reason: SDUPTHER

## 2019-10-21 RX ORDER — TIZANIDINE 2 MG/1
2 TABLET ORAL EVERY 8 HOURS PRN
Qty: 30 TABLET | Refills: 3 | Status: SHIPPED | OUTPATIENT
Start: 2019-10-21 | End: 2020-01-29 | Stop reason: SDUPTHER

## 2019-10-21 SDOH — SOCIAL STABILITY - SOCIAL INSECURITY: ACCULTURATION DIFFICULTY: Z60.3

## 2019-10-21 ASSESSMENT — ENCOUNTER SYMPTOMS
BACK PAIN: 1
DIARRHEA: 0
NAUSEA: 0
SHORTNESS OF BREATH: 0

## 2019-10-22 DIAGNOSIS — I50.30 (HFPEF) HEART FAILURE WITH PRESERVED EJECTION FRACTION (HCC): Chronic | ICD-10-CM

## 2019-10-22 DIAGNOSIS — R06.02 SOB (SHORTNESS OF BREATH): Chronic | ICD-10-CM

## 2019-10-22 DIAGNOSIS — I42.9 CARDIOMYOPATHY, UNSPECIFIED TYPE (HCC): Chronic | ICD-10-CM

## 2019-10-22 DIAGNOSIS — I25.10 CORONARY ARTERY DISEASE DUE TO LIPID RICH PLAQUE: Chronic | ICD-10-CM

## 2019-10-22 DIAGNOSIS — N28.9 RENAL INSUFFICIENCY: Chronic | ICD-10-CM

## 2019-10-22 DIAGNOSIS — K59.03 DRUG-INDUCED CONSTIPATION: ICD-10-CM

## 2019-10-22 DIAGNOSIS — I25.83 CORONARY ARTERY DISEASE DUE TO LIPID RICH PLAQUE: Chronic | ICD-10-CM

## 2019-10-22 DIAGNOSIS — K59.03 CONSTIPATION DUE TO PAIN MEDICATION: ICD-10-CM

## 2019-10-22 DIAGNOSIS — I50.22 SYSTOLIC CHF, CHRONIC (HCC): ICD-10-CM

## 2019-10-22 DIAGNOSIS — I25.10 CORONARY ARTERY DISEASE INVOLVING NATIVE CORONARY ARTERY OF NATIVE HEART WITHOUT ANGINA PECTORIS: Chronic | ICD-10-CM

## 2019-10-22 DIAGNOSIS — I10 ESSENTIAL HYPERTENSION: ICD-10-CM

## 2019-10-22 RX ORDER — SENNOSIDES AND DOCUSATE SODIUM 8.6; 5 MG/1; MG/1
TABLET, FILM COATED ORAL
Qty: 120 TABLET | Refills: 0 | Status: SHIPPED | OUTPATIENT
Start: 2019-10-22 | End: 2019-11-21

## 2019-10-22 RX ORDER — PLECANATIDE 3 MG/1
TABLET ORAL
Qty: 30 TABLET | Refills: 2 | Status: SHIPPED | OUTPATIENT
Start: 2019-10-22 | End: 2019-11-21

## 2019-10-22 RX ORDER — SPIRONOLACTONE 50 MG/1
TABLET, FILM COATED ORAL
Qty: 90 TABLET | Refills: 3 | Status: SHIPPED | OUTPATIENT
Start: 2019-10-22 | End: 2020-03-20

## 2019-10-23 RX ORDER — METOPROLOL TARTRATE 50 MG/1
TABLET, FILM COATED ORAL
Qty: 60 TABLET | Refills: 10 | OUTPATIENT
Start: 2019-10-23

## 2019-10-24 ENCOUNTER — TELEPHONE (OUTPATIENT)
Dept: ENDOCRINOLOGY | Age: 49
End: 2019-10-24

## 2019-10-24 DIAGNOSIS — I25.10 CORONARY ARTERY DISEASE INVOLVING NATIVE CORONARY ARTERY OF NATIVE HEART WITHOUT ANGINA PECTORIS: ICD-10-CM

## 2019-10-30 ENCOUNTER — HOSPITAL ENCOUNTER (OUTPATIENT)
Age: 49
Setting detail: SPECIMEN
Discharge: HOME | End: 2019-10-30
Payer: COMMERCIAL

## 2019-10-30 LAB
ANION GAP SERPL CALCULATED.3IONS-SCNC: 15 MMOL/L (ref 3–16)
BUN BLDV-MCNC: 49 MG/DL (ref 7–20)
CALCIUM SERPL-MCNC: 10.4 MG/DL (ref 8.3–10.6)
CHLORIDE BLD-SCNC: 93 MMOL/L (ref 99–110)
CO2: 27 MMOL/L (ref 21–32)
CREAT SERPL-MCNC: 1.4 MG/DL (ref 0.6–1.1)
GFR AFRICAN AMERICAN: 48
GFR NON-AFRICAN AMERICAN: 40
GLUCOSE BLD-MCNC: 201 MG/DL (ref 70–99)
POTASSIUM SERPL-SCNC: 4.2 MMOL/L (ref 3.5–5.1)
PRO-BNP: 93 PG/ML (ref 0–124)
SODIUM BLD-SCNC: 135 MMOL/L (ref 136–145)

## 2019-10-30 PROCEDURE — 83880 ASSAY OF NATRIURETIC PEPTIDE: CPT

## 2019-10-30 PROCEDURE — 80048 BASIC METABOLIC PNL TOTAL CA: CPT

## 2019-10-30 PROCEDURE — 36415 COLL VENOUS BLD VENIPUNCTURE: CPT

## 2019-10-31 ENCOUNTER — OFFICE VISIT (OUTPATIENT)
Dept: CARDIOLOGY CLINIC | Age: 49
End: 2019-10-31
Payer: COMMERCIAL

## 2019-10-31 VITALS — SYSTOLIC BLOOD PRESSURE: 124 MMHG | DIASTOLIC BLOOD PRESSURE: 56 MMHG

## 2019-10-31 DIAGNOSIS — R07.9 CHEST PAIN, UNSPECIFIED TYPE: ICD-10-CM

## 2019-10-31 DIAGNOSIS — R06.02 SOB (SHORTNESS OF BREATH): ICD-10-CM

## 2019-10-31 DIAGNOSIS — N28.9 RENAL INSUFFICIENCY: ICD-10-CM

## 2019-10-31 DIAGNOSIS — I50.32 CHRONIC HEART FAILURE WITH PRESERVED EJECTION FRACTION (HCC): Primary | ICD-10-CM

## 2019-10-31 DIAGNOSIS — I25.10 CORONARY ARTERY DISEASE INVOLVING NATIVE CORONARY ARTERY OF NATIVE HEART WITHOUT ANGINA PECTORIS: ICD-10-CM

## 2019-10-31 PROCEDURE — G8427 DOCREV CUR MEDS BY ELIG CLIN: HCPCS | Performed by: INTERNAL MEDICINE

## 2019-10-31 PROCEDURE — G8417 CALC BMI ABV UP PARAM F/U: HCPCS | Performed by: INTERNAL MEDICINE

## 2019-10-31 PROCEDURE — G8598 ASA/ANTIPLAT THER USED: HCPCS | Performed by: INTERNAL MEDICINE

## 2019-10-31 PROCEDURE — 3017F COLORECTAL CA SCREEN DOC REV: CPT | Performed by: INTERNAL MEDICINE

## 2019-10-31 PROCEDURE — 1036F TOBACCO NON-USER: CPT | Performed by: INTERNAL MEDICINE

## 2019-10-31 PROCEDURE — G8482 FLU IMMUNIZE ORDER/ADMIN: HCPCS | Performed by: INTERNAL MEDICINE

## 2019-10-31 PROCEDURE — 99214 OFFICE O/P EST MOD 30 MIN: CPT | Performed by: INTERNAL MEDICINE

## 2019-11-01 PROCEDURE — 93000 ELECTROCARDIOGRAM COMPLETE: CPT | Performed by: INTERNAL MEDICINE

## 2019-11-04 ENCOUNTER — TELEPHONE (OUTPATIENT)
Dept: CARDIOLOGY CLINIC | Age: 49
End: 2019-11-04

## 2019-11-08 ENCOUNTER — TELEPHONE (OUTPATIENT)
Dept: CARDIOLOGY CLINIC | Age: 49
End: 2019-11-08

## 2019-11-12 ENCOUNTER — HOSPITAL ENCOUNTER (OUTPATIENT)
Age: 49
Setting detail: SPECIMEN
Discharge: HOME | End: 2019-11-12
Payer: COMMERCIAL

## 2019-11-12 LAB
ANION GAP SERPL CALCULATED.3IONS-SCNC: 15 MMOL/L (ref 3–16)
BUN BLDV-MCNC: 38 MG/DL (ref 7–20)
CALCIUM SERPL-MCNC: 10 MG/DL (ref 8.3–10.6)
CHLORIDE BLD-SCNC: 91 MMOL/L (ref 99–110)
CO2: 29 MMOL/L (ref 21–32)
CREAT SERPL-MCNC: 1.8 MG/DL (ref 0.6–1.1)
GFR AFRICAN AMERICAN: 36
GFR NON-AFRICAN AMERICAN: 30
GLUCOSE BLD-MCNC: 223 MG/DL (ref 70–99)
POTASSIUM SERPL-SCNC: 5.1 MMOL/L (ref 3.5–5.1)
PRO-BNP: 183 PG/ML (ref 0–124)
SODIUM BLD-SCNC: 135 MMOL/L (ref 136–145)
URIC ACID, SERUM: 5.3 MG/DL (ref 2.6–6)

## 2019-11-12 PROCEDURE — 83880 ASSAY OF NATRIURETIC PEPTIDE: CPT

## 2019-11-12 PROCEDURE — 80048 BASIC METABOLIC PNL TOTAL CA: CPT

## 2019-11-12 PROCEDURE — 36415 COLL VENOUS BLD VENIPUNCTURE: CPT

## 2019-11-12 PROCEDURE — 84550 ASSAY OF BLOOD/URIC ACID: CPT

## 2019-11-19 ENCOUNTER — OFFICE VISIT (OUTPATIENT)
Dept: ENDOCRINOLOGY | Age: 49
End: 2019-11-19
Payer: COMMERCIAL

## 2019-11-19 VITALS
DIASTOLIC BLOOD PRESSURE: 62 MMHG | HEART RATE: 72 BPM | BODY MASS INDEX: 32.95 KG/M2 | OXYGEN SATURATION: 97 % | HEIGHT: 64 IN | SYSTOLIC BLOOD PRESSURE: 104 MMHG | WEIGHT: 193 LBS

## 2019-11-19 DIAGNOSIS — C50.812 MALIGNANT NEOPLASM OF OVERLAPPING SITES OF LEFT FEMALE BREAST, UNSPECIFIED ESTROGEN RECEPTOR STATUS (HCC): ICD-10-CM

## 2019-11-19 DIAGNOSIS — I10 ESSENTIAL HYPERTENSION: ICD-10-CM

## 2019-11-19 DIAGNOSIS — E03.8 HYPOTHYROIDISM DUE TO HASHIMOTO'S THYROIDITIS: ICD-10-CM

## 2019-11-19 DIAGNOSIS — E11.65 POORLY CONTROLLED TYPE 2 DIABETES MELLITUS (HCC): Primary | ICD-10-CM

## 2019-11-19 DIAGNOSIS — E06.3 HYPOTHYROIDISM DUE TO HASHIMOTO'S THYROIDITIS: ICD-10-CM

## 2019-11-19 DIAGNOSIS — E78.2 MIXED HYPERLIPIDEMIA: ICD-10-CM

## 2019-11-19 LAB — HBA1C MFR BLD: 7.3 %

## 2019-11-19 PROCEDURE — G8598 ASA/ANTIPLAT THER USED: HCPCS | Performed by: INTERNAL MEDICINE

## 2019-11-19 PROCEDURE — 2022F DILAT RTA XM EVC RTNOPTHY: CPT | Performed by: INTERNAL MEDICINE

## 2019-11-19 PROCEDURE — 1036F TOBACCO NON-USER: CPT | Performed by: INTERNAL MEDICINE

## 2019-11-19 PROCEDURE — 99214 OFFICE O/P EST MOD 30 MIN: CPT | Performed by: INTERNAL MEDICINE

## 2019-11-19 PROCEDURE — G8417 CALC BMI ABV UP PARAM F/U: HCPCS | Performed by: INTERNAL MEDICINE

## 2019-11-19 PROCEDURE — G8482 FLU IMMUNIZE ORDER/ADMIN: HCPCS | Performed by: INTERNAL MEDICINE

## 2019-11-19 PROCEDURE — 83036 HEMOGLOBIN GLYCOSYLATED A1C: CPT | Performed by: INTERNAL MEDICINE

## 2019-11-19 PROCEDURE — 3052F HG A1C>EQUAL 8.0%<EQUAL 9.0%: CPT | Performed by: INTERNAL MEDICINE

## 2019-11-19 PROCEDURE — G8427 DOCREV CUR MEDS BY ELIG CLIN: HCPCS | Performed by: INTERNAL MEDICINE

## 2019-11-19 PROCEDURE — 3017F COLORECTAL CA SCREEN DOC REV: CPT | Performed by: INTERNAL MEDICINE

## 2019-11-21 DIAGNOSIS — K59.03 DRUG-INDUCED CONSTIPATION: ICD-10-CM

## 2019-11-21 DIAGNOSIS — I25.10 CORONARY ARTERY DISEASE INVOLVING NATIVE CORONARY ARTERY OF NATIVE HEART WITHOUT ANGINA PECTORIS: ICD-10-CM

## 2019-11-21 DIAGNOSIS — K59.03 CONSTIPATION DUE TO PAIN MEDICATION: ICD-10-CM

## 2019-11-21 DIAGNOSIS — K44.9 HIATAL HERNIA WITH GERD: ICD-10-CM

## 2019-11-21 DIAGNOSIS — K21.9 HIATAL HERNIA WITH GERD: ICD-10-CM

## 2019-11-21 RX ORDER — SENNOSIDES AND DOCUSATE SODIUM 8.6; 5 MG/1; MG/1
TABLET, FILM COATED ORAL
Qty: 120 TABLET | Refills: 0 | Status: SHIPPED | OUTPATIENT
Start: 2019-11-21 | End: 2020-01-29 | Stop reason: SDUPTHER

## 2019-11-21 RX ORDER — OMEPRAZOLE 20 MG/1
CAPSULE, DELAYED RELEASE ORAL
Qty: 60 CAPSULE | Refills: 4 | Status: SHIPPED | OUTPATIENT
Start: 2019-11-21 | End: 2020-04-20

## 2019-11-21 RX ORDER — PLECANATIDE 3 MG/1
TABLET ORAL
Qty: 30 TABLET | Refills: 2 | Status: SHIPPED | OUTPATIENT
Start: 2019-11-21 | End: 2019-11-22

## 2019-11-22 DIAGNOSIS — I25.10 CORONARY ARTERY DISEASE INVOLVING NATIVE CORONARY ARTERY OF NATIVE HEART WITHOUT ANGINA PECTORIS: ICD-10-CM

## 2019-11-22 DIAGNOSIS — K59.03 DRUG-INDUCED CONSTIPATION: ICD-10-CM

## 2019-11-22 RX ORDER — PLECANATIDE 3 MG/1
TABLET ORAL
Qty: 30 TABLET | Refills: 2 | Status: SHIPPED | OUTPATIENT
Start: 2019-11-22 | End: 2019-12-23

## 2019-11-26 ENCOUNTER — TELEPHONE (OUTPATIENT)
Dept: CARDIOLOGY CLINIC | Age: 49
End: 2019-11-26

## 2019-11-26 ENCOUNTER — HOSPITAL ENCOUNTER (OUTPATIENT)
Age: 49
Setting detail: SPECIMEN
Discharge: HOME | End: 2019-11-26
Payer: COMMERCIAL

## 2019-11-26 LAB
ANION GAP SERPL CALCULATED.3IONS-SCNC: 13 MMOL/L (ref 3–16)
BUN BLDV-MCNC: 30 MG/DL (ref 7–20)
CALCIUM SERPL-MCNC: 10.2 MG/DL (ref 8.3–10.6)
CHLORIDE BLD-SCNC: 94 MMOL/L (ref 99–110)
CO2: 31 MMOL/L (ref 21–32)
CREAT SERPL-MCNC: 1.5 MG/DL (ref 0.6–1.1)
GFR AFRICAN AMERICAN: 44
GFR NON-AFRICAN AMERICAN: 37
GLUCOSE BLD-MCNC: 162 MG/DL (ref 70–99)
POTASSIUM SERPL-SCNC: 4.5 MMOL/L (ref 3.5–5.1)
PRO-BNP: 325 PG/ML (ref 0–124)
SODIUM BLD-SCNC: 138 MMOL/L (ref 136–145)

## 2019-11-26 PROCEDURE — 80048 BASIC METABOLIC PNL TOTAL CA: CPT

## 2019-11-26 PROCEDURE — 83880 ASSAY OF NATRIURETIC PEPTIDE: CPT

## 2019-11-26 PROCEDURE — 36415 COLL VENOUS BLD VENIPUNCTURE: CPT

## 2019-11-30 ENCOUNTER — HOSPITAL ENCOUNTER (OUTPATIENT)
Dept: VASCULAR LAB | Age: 49
Discharge: HOME | End: 2019-11-30
Payer: COMMERCIAL

## 2019-11-30 DIAGNOSIS — C50.812 MALIGNANT NEOPLASM OF OVERLAPPING SITES OF LEFT FEMALE BREAST, UNSPECIFIED ESTROGEN RECEPTOR STATUS (HCC): ICD-10-CM

## 2019-11-30 DIAGNOSIS — R60.0 LEG EDEMA: ICD-10-CM

## 2019-11-30 PROCEDURE — 93971 EXTREMITY STUDY: CPT

## 2019-12-02 ENCOUNTER — TELEPHONE (OUTPATIENT)
Dept: CARDIOLOGY CLINIC | Age: 49
End: 2019-12-02

## 2019-12-10 ENCOUNTER — TELEPHONE (OUTPATIENT)
Dept: ENDOCRINOLOGY | Age: 49
End: 2019-12-10

## 2019-12-10 ENCOUNTER — HOSPITAL ENCOUNTER (OUTPATIENT)
Age: 49
Setting detail: SPECIMEN
Discharge: HOME | End: 2019-12-10
Payer: COMMERCIAL

## 2019-12-10 DIAGNOSIS — R06.02 SOB (SHORTNESS OF BREATH): Primary | Chronic | ICD-10-CM

## 2019-12-10 DIAGNOSIS — E11.65 POORLY CONTROLLED TYPE 2 DIABETES MELLITUS (HCC): ICD-10-CM

## 2019-12-10 DIAGNOSIS — E03.2 HYPOTHYROIDISM DUE TO MEDICATION: ICD-10-CM

## 2019-12-10 LAB
ANION GAP SERPL CALCULATED.3IONS-SCNC: 15 MMOL/L (ref 3–16)
BUN BLDV-MCNC: 41 MG/DL (ref 7–20)
CALCIUM SERPL-MCNC: 9.7 MG/DL (ref 8.3–10.6)
CHLORIDE BLD-SCNC: 93 MMOL/L (ref 99–110)
CO2: 28 MMOL/L (ref 21–32)
CREAT SERPL-MCNC: 2 MG/DL (ref 0.6–1.1)
GFR AFRICAN AMERICAN: 32
GFR NON-AFRICAN AMERICAN: 26
GLUCOSE BLD-MCNC: 235 MG/DL (ref 70–99)
POTASSIUM SERPL-SCNC: 4.9 MMOL/L (ref 3.5–5.1)
PRO-BNP: 67 PG/ML (ref 0–124)
SEDIMENTATION RATE, ERYTHROCYTE: 113 MM/HR (ref 0–30)
SODIUM BLD-SCNC: 136 MMOL/L (ref 136–145)

## 2019-12-10 PROCEDURE — 83835 ASSAY OF METANEPHRINES: CPT

## 2019-12-10 PROCEDURE — 83001 ASSAY OF GONADOTROPIN (FSH): CPT

## 2019-12-10 PROCEDURE — 82670 ASSAY OF TOTAL ESTRADIOL: CPT

## 2019-12-10 PROCEDURE — 80048 BASIC METABOLIC PNL TOTAL CA: CPT

## 2019-12-10 PROCEDURE — 83880 ASSAY OF NATRIURETIC PEPTIDE: CPT

## 2019-12-10 PROCEDURE — 85652 RBC SED RATE AUTOMATED: CPT

## 2019-12-10 PROCEDURE — 83002 ASSAY OF GONADOTROPIN (LH): CPT

## 2019-12-10 PROCEDURE — 83520 IMMUNOASSAY QUANT NOS NONAB: CPT

## 2019-12-10 PROCEDURE — 36415 COLL VENOUS BLD VENIPUNCTURE: CPT

## 2019-12-11 ENCOUNTER — TELEPHONE (OUTPATIENT)
Dept: CARDIOLOGY CLINIC | Age: 49
End: 2019-12-11

## 2019-12-11 LAB
ESTRADIOL LEVEL: 24 PG/ML
FOLLICLE STIMULATING HORMONE: 2.2 MIU/ML
LUTEINIZING HORMONE: <0.1 MIU/ML

## 2019-12-14 LAB
METANEPH/PLASMA INTERP: NORMAL
METANEPHRINE FREE PLASMA: <0.1 NMOL/L (ref 0–0.49)
NORMETANEPHRINE FREE PLASMA: 0.76 NMOL/L (ref 0–0.89)

## 2019-12-17 LAB — MISCELLANEOUS LAB TEST ORDER: NORMAL

## 2019-12-20 ENCOUNTER — TELEPHONE (OUTPATIENT)
Dept: CARDIOLOGY | Age: 49
End: 2019-12-20

## 2019-12-21 DIAGNOSIS — K59.03 DRUG-INDUCED CONSTIPATION: ICD-10-CM

## 2019-12-21 DIAGNOSIS — I25.10 CORONARY ARTERY DISEASE INVOLVING NATIVE CORONARY ARTERY OF NATIVE HEART WITHOUT ANGINA PECTORIS: ICD-10-CM

## 2019-12-23 RX ORDER — MELATONIN
Qty: 90 TABLET | Refills: 1 | Status: SHIPPED | OUTPATIENT
Start: 2019-12-23 | End: 2020-02-19

## 2019-12-23 RX ORDER — PLECANATIDE 3 MG/1
TABLET ORAL
Qty: 30 TABLET | Refills: 2 | Status: SHIPPED | OUTPATIENT
Start: 2019-12-23 | End: 2019-12-26

## 2019-12-24 DIAGNOSIS — K59.03 DRUG-INDUCED CONSTIPATION: ICD-10-CM

## 2019-12-24 DIAGNOSIS — I25.10 CORONARY ARTERY DISEASE INVOLVING NATIVE CORONARY ARTERY OF NATIVE HEART WITHOUT ANGINA PECTORIS: ICD-10-CM

## 2019-12-24 RX ORDER — TORSEMIDE 100 MG/1
TABLET ORAL
Qty: 135 TABLET | Refills: 3 | Status: SHIPPED | OUTPATIENT
Start: 2019-12-24 | End: 2021-01-05

## 2019-12-24 RX ORDER — RANOLAZINE 500 MG/1
TABLET, EXTENDED RELEASE ORAL
Qty: 180 TABLET | Refills: 3 | Status: SHIPPED | OUTPATIENT
Start: 2019-12-24 | End: 2021-01-05

## 2019-12-26 ENCOUNTER — HOSPITAL ENCOUNTER (OUTPATIENT)
Age: 49
Setting detail: SPECIMEN
Discharge: HOME | End: 2019-12-26
Payer: COMMERCIAL

## 2019-12-26 ENCOUNTER — TELEPHONE (OUTPATIENT)
Dept: INTERNAL MEDICINE CLINIC | Age: 49
End: 2019-12-26

## 2019-12-26 LAB
ANION GAP SERPL CALCULATED.3IONS-SCNC: 12 MMOL/L (ref 3–16)
BUN BLDV-MCNC: 28 MG/DL (ref 7–20)
CALCIUM SERPL-MCNC: 9.9 MG/DL (ref 8.3–10.6)
CHLORIDE BLD-SCNC: 92 MMOL/L (ref 99–110)
CO2: 31 MMOL/L (ref 21–32)
CREAT SERPL-MCNC: 1.6 MG/DL (ref 0.6–1.1)
GFR AFRICAN AMERICAN: 41
GFR NON-AFRICAN AMERICAN: 34
GLUCOSE BLD-MCNC: 61 MG/DL (ref 70–99)
POTASSIUM SERPL-SCNC: 4.2 MMOL/L (ref 3.5–5.1)
PRO-BNP: 167 PG/ML (ref 0–124)
SODIUM BLD-SCNC: 135 MMOL/L (ref 136–145)

## 2019-12-26 PROCEDURE — 36415 COLL VENOUS BLD VENIPUNCTURE: CPT

## 2019-12-26 PROCEDURE — 80048 BASIC METABOLIC PNL TOTAL CA: CPT

## 2019-12-26 PROCEDURE — 83880 ASSAY OF NATRIURETIC PEPTIDE: CPT

## 2019-12-26 RX ORDER — PLECANATIDE 3 MG/1
TABLET ORAL
Qty: 30 TABLET | Refills: 2 | Status: SHIPPED | OUTPATIENT
Start: 2019-12-26 | End: 2020-06-22

## 2019-12-30 ENCOUNTER — TELEPHONE (OUTPATIENT)
Dept: CARDIOLOGY CLINIC | Age: 49
End: 2019-12-30

## 2020-01-10 ENCOUNTER — TELEPHONE (OUTPATIENT)
Dept: CARDIOLOGY CLINIC | Age: 50
End: 2020-01-10

## 2020-01-10 ENCOUNTER — HOSPITAL ENCOUNTER (OUTPATIENT)
Age: 50
Setting detail: SPECIMEN
Discharge: HOME | End: 2020-01-10
Payer: COMMERCIAL

## 2020-01-10 LAB
ANION GAP SERPL CALCULATED.3IONS-SCNC: 14 MMOL/L (ref 3–16)
BUN BLDV-MCNC: 38 MG/DL (ref 7–20)
CALCIUM SERPL-MCNC: 11 MG/DL (ref 8.3–10.6)
CHLORIDE BLD-SCNC: 95 MMOL/L (ref 99–110)
CO2: 28 MMOL/L (ref 21–32)
CREAT SERPL-MCNC: 1.5 MG/DL (ref 0.6–1.1)
GFR AFRICAN AMERICAN: 44
GFR NON-AFRICAN AMERICAN: 37
GLUCOSE BLD-MCNC: 104 MG/DL (ref 70–99)
POTASSIUM SERPL-SCNC: 4.2 MMOL/L (ref 3.5–5.1)
PRO-BNP: 168 PG/ML (ref 0–124)
SODIUM BLD-SCNC: 137 MMOL/L (ref 136–145)
URIC ACID, SERUM: 4.8 MG/DL (ref 2.6–6)

## 2020-01-10 PROCEDURE — 36415 COLL VENOUS BLD VENIPUNCTURE: CPT

## 2020-01-10 PROCEDURE — 83880 ASSAY OF NATRIURETIC PEPTIDE: CPT

## 2020-01-10 PROCEDURE — 80048 BASIC METABOLIC PNL TOTAL CA: CPT

## 2020-01-10 PROCEDURE — 84550 ASSAY OF BLOOD/URIC ACID: CPT

## 2020-01-10 NOTE — TELEPHONE ENCOUNTER
OK to continue Derrek Patino with same orders? She has OV with you 2/12/2020. Please send response back to me. Thank you.  Gerald Jacob

## 2020-01-13 ENCOUNTER — TELEPHONE (OUTPATIENT)
Dept: CARDIOLOGY CLINIC | Age: 50
End: 2020-01-13

## 2020-01-21 ENCOUNTER — HOSPITAL ENCOUNTER (OUTPATIENT)
Age: 50
Setting detail: SPECIMEN
Discharge: HOME | End: 2020-01-21
Payer: COMMERCIAL

## 2020-01-21 LAB
ALBUMIN SERPL-MCNC: 4.6 G/DL (ref 3.4–5)
ALP BLD-CCNC: 130 U/L (ref 40–129)
ALT SERPL-CCNC: 19 U/L (ref 10–40)
ANION GAP SERPL CALCULATED.3IONS-SCNC: 19 MMOL/L (ref 3–16)
AST SERPL-CCNC: 19 U/L (ref 15–37)
BASOPHILS ABSOLUTE: 0.1 K/UL (ref 0–0.2)
BASOPHILS RELATIVE PERCENT: 0.6 %
BILIRUB SERPL-MCNC: <0.2 MG/DL (ref 0–1)
BILIRUBIN DIRECT: <0.2 MG/DL (ref 0–0.3)
BILIRUBIN, INDIRECT: ABNORMAL MG/DL (ref 0–1)
BUN BLDV-MCNC: 55 MG/DL (ref 7–20)
CALCIUM SERPL-MCNC: 9.9 MG/DL (ref 8.3–10.6)
CHLORIDE BLD-SCNC: 87 MMOL/L (ref 99–110)
CO2: 24 MMOL/L (ref 21–32)
CREAT SERPL-MCNC: 1.4 MG/DL (ref 0.6–1.1)
EOSINOPHILS ABSOLUTE: 0.1 K/UL (ref 0–0.6)
EOSINOPHILS RELATIVE PERCENT: 0.8 %
GFR AFRICAN AMERICAN: 48
GFR NON-AFRICAN AMERICAN: 40
GLUCOSE BLD-MCNC: 367 MG/DL (ref 70–99)
HCT VFR BLD CALC: 40.6 % (ref 36–48)
HEMOGLOBIN: 13.1 G/DL (ref 12–16)
LYMPHOCYTES ABSOLUTE: 2.3 K/UL (ref 1–5.1)
LYMPHOCYTES RELATIVE PERCENT: 27.8 %
MCH RBC QN AUTO: 29.1 PG (ref 26–34)
MCHC RBC AUTO-ENTMCNC: 32.4 G/DL (ref 31–36)
MCV RBC AUTO: 89.8 FL (ref 80–100)
MONOCYTES ABSOLUTE: 0.5 K/UL (ref 0–1.3)
MONOCYTES RELATIVE PERCENT: 6.6 %
NEUTROPHILS ABSOLUTE: 5.3 K/UL (ref 1.7–7.7)
NEUTROPHILS RELATIVE PERCENT: 64.2 %
PDW BLD-RTO: 14.4 % (ref 12.4–15.4)
PLATELET # BLD: 264 K/UL (ref 135–450)
PMV BLD AUTO: 10.8 FL (ref 5–10.5)
POTASSIUM SERPL-SCNC: 3.8 MMOL/L (ref 3.5–5.1)
PRO-BNP: 105 PG/ML (ref 0–124)
RBC # BLD: 4.52 M/UL (ref 4–5.2)
SEDIMENTATION RATE, ERYTHROCYTE: 104 MM/HR (ref 0–30)
SODIUM BLD-SCNC: 130 MMOL/L (ref 136–145)
TOTAL PROTEIN: 8.3 G/DL (ref 6.4–8.2)
WBC # BLD: 8.3 K/UL (ref 4–11)

## 2020-01-21 PROCEDURE — 80048 BASIC METABOLIC PNL TOTAL CA: CPT

## 2020-01-21 PROCEDURE — 85025 COMPLETE CBC W/AUTO DIFF WBC: CPT

## 2020-01-21 PROCEDURE — 83880 ASSAY OF NATRIURETIC PEPTIDE: CPT

## 2020-01-21 PROCEDURE — 36415 COLL VENOUS BLD VENIPUNCTURE: CPT

## 2020-01-21 PROCEDURE — 80076 HEPATIC FUNCTION PANEL: CPT

## 2020-01-21 PROCEDURE — 86140 C-REACTIVE PROTEIN: CPT

## 2020-01-21 PROCEDURE — 85652 RBC SED RATE AUTOMATED: CPT

## 2020-01-22 LAB — C-REACTIVE PROTEIN: 13.5 MG/L (ref 0–5.1)

## 2020-01-24 ENCOUNTER — TELEPHONE (OUTPATIENT)
Dept: CARDIOLOGY CLINIC | Age: 50
End: 2020-01-24

## 2020-01-24 NOTE — TELEPHONE ENCOUNTER
None of that makes sense. Looks dry but weight up? I would not do anything. Continue the course.   Explain to patient not to drink sugary juices

## 2020-01-24 NOTE — TELEPHONE ENCOUNTER
Isabela Soria from home care called   Weight up 6 pounds   Blood sugar elevated, she drinks juice every morning before even checking blood sugar  She is to take metolazone on Sunday  Looks as if she is on the dry side    What do you think?

## 2020-01-24 NOTE — TELEPHONE ENCOUNTER
Please call patient and tell her not to take metolazone on Sunday  Also she should not be drinking juice in the morning.   She should check blood sugar first  thanks

## 2020-01-28 ENCOUNTER — HOSPITAL ENCOUNTER (OUTPATIENT)
Dept: NON INVASIVE DIAGNOSTICS | Age: 50
Discharge: HOME | End: 2020-01-28
Payer: COMMERCIAL

## 2020-01-28 LAB
LEFT VENTRICULAR EJECTION FRACTION HIGH VALUE: 60 %
LEFT VENTRICULAR EJECTION FRACTION MODE: NORMAL
LV EF: 55 %
LV EF: 58 %
LVEF MODALITY: NORMAL

## 2020-01-28 PROCEDURE — 93306 TTE W/DOPPLER COMPLETE: CPT

## 2020-01-29 ENCOUNTER — OFFICE VISIT (OUTPATIENT)
Dept: INTERNAL MEDICINE CLINIC | Age: 50
End: 2020-01-29
Payer: COMMERCIAL

## 2020-01-29 VITALS
SYSTOLIC BLOOD PRESSURE: 112 MMHG | OXYGEN SATURATION: 96 % | BODY MASS INDEX: 32.92 KG/M2 | WEIGHT: 191.8 LBS | DIASTOLIC BLOOD PRESSURE: 72 MMHG | HEART RATE: 88 BPM

## 2020-01-29 DIAGNOSIS — K14.6 TONGUE PAIN: ICD-10-CM

## 2020-01-29 PROBLEM — R61 EXCESSIVE SWEATING: Status: ACTIVE | Noted: 2020-01-29

## 2020-01-29 LAB
FOLATE: >20 NG/ML (ref 4.78–24.2)
VITAMIN B-12: 396 PG/ML (ref 211–911)

## 2020-01-29 PROCEDURE — 99214 OFFICE O/P EST MOD 30 MIN: CPT | Performed by: INTERNAL MEDICINE

## 2020-01-29 PROCEDURE — G8417 CALC BMI ABV UP PARAM F/U: HCPCS | Performed by: INTERNAL MEDICINE

## 2020-01-29 PROCEDURE — 1036F TOBACCO NON-USER: CPT | Performed by: INTERNAL MEDICINE

## 2020-01-29 PROCEDURE — 3017F COLORECTAL CA SCREEN DOC REV: CPT | Performed by: INTERNAL MEDICINE

## 2020-01-29 PROCEDURE — G8482 FLU IMMUNIZE ORDER/ADMIN: HCPCS | Performed by: INTERNAL MEDICINE

## 2020-01-29 PROCEDURE — G8427 DOCREV CUR MEDS BY ELIG CLIN: HCPCS | Performed by: INTERNAL MEDICINE

## 2020-01-29 RX ORDER — TIZANIDINE 2 MG/1
2 TABLET ORAL EVERY 8 HOURS PRN
Qty: 30 TABLET | Refills: 3 | Status: SHIPPED | OUTPATIENT
Start: 2020-01-29 | End: 2020-09-23

## 2020-01-29 RX ORDER — AMOXICILLIN 250 MG
CAPSULE ORAL
Qty: 120 TABLET | Refills: 0 | Status: SHIPPED | OUTPATIENT
Start: 2020-01-29 | End: 2020-02-19

## 2020-01-29 NOTE — PROGRESS NOTES
Hospital Lab     Sed Rate 104High   0 - 30 mm/Hr Final 01/21/2020  4:28 PM [address] Lab         Allergies   Allergen Reactions    Iv Dye [Iodides] Anaphylaxis     allergic to ct scan dye, not the MRI    Basaglar Kwikpen [Insulin Glargine]      High blood sugar     Trazodone And Nefazodone Other (See Comments)     Makes patient feel very sluggish      Past Medical History:   Diagnosis Date    Anxiety     Anxiety and depression     Arthritis     not sure of specific type    Asthma     CAD (coronary artery disease)     Cancer ([facility] [address] 2007    left breast    Cerebral artery occlusion with cerebral infarction ([facility] [address]     CHF (congestive heart failure) ([facility] [address] 2018    Chronic kidney disease     renal insufficency/to see Dr Ubaldo Longoria    Chronic pain     Constipation     COPD (chronic obstructive pulmonary disease) ([facility] [address]     Depression     Diabetes mellitus ([facility] [address]     Diabetic polyneuropathy associated with type 2 diabetes mellitus ([facility] [address] 2/2/2018    Dysthymia 2/2/2018    ESBL (extended spectrum beta-lactamase) producing bacteria infection 03/14/2018    urine    Fibromyalgia     Gastric ulcer, unspecified as acute or chronic, without mention of hemorrhage, perforation, or obstruction     GERD (gastroesophageal reflux disease)     Gout     HIGH CHOLESTEROL     Hypertension     Hypothyroidism     Severe persistent asthma without complication 7/6/6921    Thyroid disease     TIA (transient ischemic attack)     with occasional left leg and hand weakness      Past Surgical History:   Procedure Laterality Date    BREAST SURGERY      left mastectomy    CARPAL TUNNEL RELEASE      Bilateral    FINGER CONTRACTURE SURGERY      HYSTERECTOMY  2005    USO    TONSILLECTOMY      UPPER GASTROINTESTINAL ENDOSCOPY  2019    stretched esophagous       Social History     Socioeconomic History    Marital status:      Spouse name: Philip Berumen Number of children: 3    Years of education: Not on file    Highest education level: Not on file   Occupational History    Occupation: disabled   Social Needs    Financial resource strain: Not on file    Food insecurity:     Worry: Not on file     Inability: Not on file   [employer] needs:     Medical: Not on file     Non-medical: Not on file   Tobacco Use    Smoking status: Never Smoker    Smokeless tobacco: Never Used   Substance and Sexual Activity    Alcohol use: No    Drug use: No    Sexual activity: Not on file   Lifestyle    Physical activity:     Days per week: Not on file     Minutes per session: Not on file    Stress: Not on file   Relationships    Social connections:     Talks on phone: Not on file     Gets together: Not on file     Attends Faith service: Not on file     Active member of club or organization: Not on file     Attends meetings of clubs or organizations: Not on file     Relationship status: Not on file    Intimate partner violence:     Fear of current or ex partner: Not on file     Emotionally abused: Not on file     Physically abused: Not on file     Forced sexual activity: Not on file   Other Topics Concern    Not on file   Social History Narrative    Not on file     Family History   Problem Relation Age of Onset    Asthma Other     Cancer Other     Depression Other     Diabetes Other     Hypertension Other     High Cholesterol Other     Migraines Other     Heart Attack Father 72         of MI    High Blood Pressure Mother     Diabetes Mother         Outpatient Medications Prior to Visit   Medication Sig Dispense Refill    TRULANCE 3 MG TABS TAKE 1 TABLET BY MOUTH ONE TIME A DAY  30 tablet 2    Insulin Degludec (TRESIBA FLEXTOUCH) 200 UNIT/ML SOPN inject 150 units subcutaneously once daily 15 mL 1    torsemide (DEMADEX) 100 MG tablet TAKE 1 TABLET BY MOUTH IN THE MORNING AND 1/2 TABLET IN THE EVENING  135 tablet 3    ranolazine (RANEXA) 500 MG extended release tablet TAKE 1 TABLET BY MOUTH TWO TIMES A DAY  180 tablet 3    Insulin Degludec (TRESIBA FLEXTOUCH) 200 UNIT/ML SOPN inject 150 units subcutaneously once daily 15 mL 1    Cholecalciferol (VITAMIN D3) 25 MCG (1000 UT) TABS TAKE 3 TABLETS BY MOUTH ONE TIME A DAY  90 tablet 1    blood glucose test strips (FREESTYLE LITE) strip test blood sugar four times daily 200 strip 0    Insulin Pen Needle (B-D UF III MINI PEN NEEDLES) 31G X 5 MM MISC use five times daily with insulin 500 each 5    Insulin Degludec (TRESIBA FLEXTOUCH) 200 UNIT/ML SOPN inject 150 units subcutaneously once daily 15 mL 1    omeprazole (PRILOSEC) 20 MG delayed release capsule TAKE 1 CAPSULE BY MOUTH TWO TIMES A DAY  60 capsule 4    Insulin Degludec (TRESIBA FLEXTOUCH) 200 UNIT/ML SOPN inject 150 units subcutaneously once daily 15 mL 1    Glucagon 1 MG/0.2ML SOSY To be used in case of severe hypoglycemia 2 Syringe 2    insulin aspart (NOVOLOG FLEXPEN) 100 UNIT/ML injection pen inject 50 units subcutaneously three times daily before meals (Patient taking differently: inject 30-50 units subcutaneously three times daily before meals) 45 pen 1    spironolactone (ALDACTONE) 50 MG tablet take 2 tabs by mouth in the morning and 1 tab in the evening 90 tablet 3    leflunomide (ARAVA) 10 MG tablet Take 10 mg by mouth daily      metoprolol tartrate (LOPRESSOR) 25 MG tablet Take 1 tablet by mouth 2 times daily 180 tablet 3    simvastatin (ZOCOR) 20 MG tablet TAKE 1 TABLET BY MOUTH nightly 90 tablet 3    B-D UF III MINI PEN NEEDLES 31G X 5 MM MISC use five times daily with insulin 200 each 2    glipiZIDE (GLUCOTROL XL) 10 MG extended release tablet TAKE 1 TABLET BY MOUTH 2 TIME A DAY 60 tablet 5    montelukast (SINGULAIR) 10 MG tablet TAKE 1 TABLET BY MOUTH ONE TIME A DAY 30 tablet 4    Ertugliflozin L-PyroglutamicAc (STEGLATRO) 5 MG TABS Take one tab daily 30 tablet 5    loratadine (CLARITIN) 10 MG tablet TAKE 1 TABLET BY MOUTH ONE TIME A DAY  30 tablet 5    levothyroxine (SYNTHROID) 150 MCG tablet Take 1 tablet by mouth every morning (before breakfast) 90 tablet 3    Cholecalciferol (VITAMIN D) 2000 units TABS tablet Take 2,000 Units by mouth daily  5    Blood Pressure KIT BP monitoring at home 1 kit 0    ULORIC 40 MG TABS tablet Take 40 mg by mouth daily  2    Blood Glucose Monitoring Suppl (FREESTYLE LITE) MINDY Test blood sugar QID 1 Device 0    metolazone (ZAROXOLYN) 2.5 MG tablet TAKE 1 TABLET BY MOUTH ONE TIME A DAY AS NEEDED 30 tablet 0    nitroGLYCERIN (NITROSTAT) 0.4 MG SL tablet Place 1 tablet under the tongue every 5 minutes as needed for Chest pain up to max of 3 total doses. If no relief after 1 dose, call 911. 25 tablet 1    ferrous sulfate 325 (65 Fe) MG tablet Take 1 tablet by mouth 2 times daily (with meals) 180 tablet 3    polyethylene glycol (GLYCOLAX) powder Take 17 g by mouth nightly 17 g 3    colchicine (COLCRYS) 0.6 MG tablet Take 0.6 mg by mouth 2 times daily       albuterol sulfate  (90 Base) MCG/ACT inhaler Inhale 2 puffs into the lungs every 6 hours as needed for Wheezing (Patient taking differently: Inhale 2 puffs into the lungs 2 times daily ) 1 Inhaler 6    OXYGEN Inhale 2 L into the lungs nightly Sometimes with activity      oxyCODONE (OXYCONTIN) 20 MG extended release tablet Take 20 mg by mouth every 12 hours.       aspirin 81 MG EC tablet Take 81 mg by mouth daily      sulfaSALAzine (AZULFIDINE) 500 MG tablet Take 500 mg by mouth 2 times daily      calcium carbonate-vitamin D (CALCIUM 600+D) 600-200 MG-UNIT TABS Take 1 tablet by mouth 2 times daily      benztropine (COGENTIN) 1 MG tablet Take 1 mg by mouth nightly       folic acid (FOLVITE) 1 MG tablet Take 1 mg by mouth daily      lurasidone (LATUDA) 40 MG TABS tablet Take 40 mg by mouth Daily with supper       albuterol (ACCUNEB) 1.25 MG/3ML nebulizer solution Inhale 1 ampule into the lungs every 6 hours as needed for Wheezing      mometasone-formoterol (DULERA) 200-5 MCG/ACT inhaler Inhale 2 puffs into the lungs every 12 hours      fluticasone (FLONASE) 50 MCG/ACT nasal spray 1 spray by Nasal route daily 1 Bottle 0    oxyCODONE-acetaminophen (PERCOCET)  MG per tablet Take 1 tablet by mouth every 4 hours as needed for Pain . Earliest Fill Date: 6/8/17 100 tablet 0    Elastic Bandages & Supports (FUTURO SUPPORT GLOVE MEDIUM) MISC 1 ready made support glove 1 each 2    Compression Sleeve MISC by Does not apply route 1 sleeve, 20-30 mmHg 1 each 2    ALPRAZolam (XANAX) 1 MG tablet Take 1 mg by mouth nightly.  LYRICA 50 MG capsule TAKE 1 CAPSULE BY MOUTH TWICE DAILY (Patient taking differently: 1 tab BID) 60 capsule 0    duloxetine (CYMBALTA) 60 MG capsule Take 60 mg by mouth 2 times daily.  SM SENNA-S 8.6-50 MG per tablet TAKE 2 TABLETS BY MOUTH TWO TIMES A DAY  120 tablet 0    tiZANidine (ZANAFLEX) 2 MG tablet Take 1 tablet by mouth every 8 hours as needed (pain and spasm) 30 tablet 3     No facility-administered medications prior to visit. Patient'spast medical history, surgical history, family history, medications,  and allergies  were all reviewed and updated as appropriate today. Review of Systems   Constitutional: Negative for appetite change, fatigue and fever. HENT: Positive for mouth sores, sore throat and trouble swallowing. Respiratory: Negative for chest tightness and shortness of breath. Cardiovascular: Negative for chest pain. Gastrointestinal: Negative for constipation and diarrhea. Musculoskeletal: Positive for arthralgias, joint swelling and myalgias. Skin: Negative for rash. /72   Pulse 88   Wt 191 lb 12.8 oz (87 kg)   SpO2 96%   BMI 32.92 kg/m²   Physical Exam  Vitals signs and nursing note reviewed. Constitutional:       Appearance: She is well-developed. She is not toxic-appearing. HENT:      Head: Normocephalic.       Right Ear: Tympanic membrane, ear canal and external ear normal.      Left discuss with pain physician. Relevant Medications    tiZANidine (ZANAFLEX) 2 MG tablet    Tongue pain     No clear thrush but trial of nystatin. Check B-12 levels to r/o B-12 deficiency.           Relevant Medications    tiZANidine (ZANAFLEX) 2 MG tablet    nystatin (MYCOSTATIN) 222448 UNIT/ML suspension    Other Relevant Orders    VITAMIN B12 & FOLATE (Completed)          Current Outpatient Medications   Medication Sig Dispense Refill    senna-docusate (SM SENNA-S) 8.6-50 MG per tablet TAKE 2 TABLETS BY MOUTH TWO TIMES A  tablet 0    tiZANidine (ZANAFLEX) 2 MG tablet Take 1 tablet by mouth every 8 hours as needed (pain and spasm) 30 tablet 3    nystatin (MYCOSTATIN) 094328 UNIT/ML suspension Take 5 mLs by mouth 4 times daily for 7 days 150 mL 0    TRULANCE 3 MG TABS TAKE 1 TABLET BY MOUTH ONE TIME A DAY  30 tablet 2    Insulin Degludec (TRESIBA FLEXTOUCH) 200 UNIT/ML SOPN inject 150 units subcutaneously once daily 15 mL 1    torsemide (DEMADEX) 100 MG tablet TAKE 1 TABLET BY MOUTH IN THE MORNING AND 1/2 TABLET IN THE EVENING  135 tablet 3    ranolazine (RANEXA) 500 MG extended release tablet TAKE 1 TABLET BY MOUTH TWO TIMES A DAY  180 tablet 3    Insulin Degludec (TRESIBA FLEXTOUCH) 200 UNIT/ML SOPN inject 150 units subcutaneously once daily 15 mL 1    Cholecalciferol (VITAMIN D3) 25 MCG (1000 UT) TABS TAKE 3 TABLETS BY MOUTH ONE TIME A DAY  90 tablet 1    blood glucose test strips (FREESTYLE LITE) strip test blood sugar four times daily 200 strip 0    Insulin Pen Needle (B-D UF III MINI PEN NEEDLES) 31G X 5 MM MISC use five times daily with insulin 500 each 5    Insulin Degludec (TRESIBA FLEXTOUCH) 200 UNIT/ML SOPN inject 150 units subcutaneously once daily 15 mL 1    omeprazole (PRILOSEC) 20 MG delayed release capsule TAKE 1 CAPSULE BY MOUTH TWO TIMES A DAY  60 capsule 4    Insulin Degludec (TRESIBA FLEXTOUCH) 200 UNIT/ML SOPN inject 150 units subcutaneously once daily 15 mL 1    medications for this visit. Return in about 3 months (around 4/29/2020).

## 2020-02-02 PROBLEM — K14.6 TONGUE PAIN: Status: ACTIVE | Noted: 2020-02-02

## 2020-02-02 ASSESSMENT — ENCOUNTER SYMPTOMS
DIARRHEA: 0
TROUBLE SWALLOWING: 1
SHORTNESS OF BREATH: 0
SORE THROAT: 1
CONSTIPATION: 0
CHEST TIGHTNESS: 0

## 2020-02-02 NOTE — ASSESSMENT & PLAN NOTE
Suspect related to medications (? Cymbalta). Dr. Donald Carrillo did check labs for menopause and she does not appear to be menopausal. Does not sound to be related to fluctuations in her blood sugars. Encouraged to discuss her sweating with her psychiatrist to see if her Cymbalta could be changed.

## 2020-02-03 ENCOUNTER — HOSPITAL ENCOUNTER (OUTPATIENT)
Age: 50
Setting detail: SPECIMEN
Discharge: HOME | End: 2020-02-03
Payer: COMMERCIAL

## 2020-02-03 LAB
ANION GAP SERPL CALCULATED.3IONS-SCNC: 16 MMOL/L (ref 3–16)
BUN BLDV-MCNC: 38 MG/DL (ref 7–20)
CALCIUM SERPL-MCNC: 9.5 MG/DL (ref 8.3–10.6)
CHLORIDE BLD-SCNC: 94 MMOL/L (ref 99–110)
CO2: 26 MMOL/L (ref 21–32)
CREAT SERPL-MCNC: 1.4 MG/DL (ref 0.6–1.1)
GFR AFRICAN AMERICAN: 48
GFR NON-AFRICAN AMERICAN: 40
GLUCOSE BLD-MCNC: 345 MG/DL (ref 70–99)
POTASSIUM SERPL-SCNC: 4.7 MMOL/L (ref 3.5–5.1)
PRO-BNP: 96 PG/ML (ref 0–124)
SODIUM BLD-SCNC: 136 MMOL/L (ref 136–145)
URIC ACID, SERUM: 4 MG/DL (ref 2.6–6)

## 2020-02-03 PROCEDURE — 83880 ASSAY OF NATRIURETIC PEPTIDE: CPT

## 2020-02-03 PROCEDURE — 84550 ASSAY OF BLOOD/URIC ACID: CPT

## 2020-02-03 PROCEDURE — 80048 BASIC METABOLIC PNL TOTAL CA: CPT

## 2020-02-03 PROCEDURE — 36415 COLL VENOUS BLD VENIPUNCTURE: CPT

## 2020-02-06 ENCOUNTER — TELEPHONE (OUTPATIENT)
Dept: CARDIOLOGY CLINIC | Age: 50
End: 2020-02-06

## 2020-02-07 ENCOUNTER — TELEPHONE (OUTPATIENT)
Dept: CARDIOLOGY CLINIC | Age: 50
End: 2020-02-07

## 2020-02-12 ENCOUNTER — OFFICE VISIT (OUTPATIENT)
Dept: CARDIOLOGY CLINIC | Age: 50
End: 2020-02-12
Payer: COMMERCIAL

## 2020-02-12 VITALS
SYSTOLIC BLOOD PRESSURE: 100 MMHG | HEART RATE: 85 BPM | BODY MASS INDEX: 37.89 KG/M2 | HEIGHT: 60 IN | WEIGHT: 193 LBS | DIASTOLIC BLOOD PRESSURE: 70 MMHG | OXYGEN SATURATION: 96 %

## 2020-02-12 PROCEDURE — G8482 FLU IMMUNIZE ORDER/ADMIN: HCPCS | Performed by: INTERNAL MEDICINE

## 2020-02-12 PROCEDURE — G8417 CALC BMI ABV UP PARAM F/U: HCPCS | Performed by: INTERNAL MEDICINE

## 2020-02-12 PROCEDURE — 3017F COLORECTAL CA SCREEN DOC REV: CPT | Performed by: INTERNAL MEDICINE

## 2020-02-12 PROCEDURE — 99214 OFFICE O/P EST MOD 30 MIN: CPT | Performed by: INTERNAL MEDICINE

## 2020-02-12 PROCEDURE — G8427 DOCREV CUR MEDS BY ELIG CLIN: HCPCS | Performed by: INTERNAL MEDICINE

## 2020-02-12 PROCEDURE — 1036F TOBACCO NON-USER: CPT | Performed by: INTERNAL MEDICINE

## 2020-02-12 RX ORDER — NITROGLYCERIN 0.4 MG/1
0.4 TABLET SUBLINGUAL EVERY 5 MIN PRN
Qty: 25 TABLET | Refills: 3 | Status: SHIPPED | OUTPATIENT
Start: 2020-02-12 | End: 2020-06-22 | Stop reason: SDUPTHER

## 2020-02-12 NOTE — PROGRESS NOTES
Labs are reviewed and discussed with her  who is present for the visit. The patient does speak and understand English but seems to prefer talking in her native language. Her  then conveys her symptoms. He is very well versed with her medical co-morbidities and medications. She is wearing oxygen at 2 liters today.             Allergies   Allergen Reactions    Iv Dye [Iodides] Anaphylaxis     allergic to ct scan dye, not the MRI    Basaglar Kwikpen [Insulin Glargine]      High blood sugar     Trazodone And Nefazodone Other (See Comments)     Makes patient feel very sluggish     Current Outpatient Medications   Medication Sig Dispense Refill    tiZANidine (ZANAFLEX) 2 MG tablet Take 1 tablet by mouth every 8 hours as needed (pain and spasm) 30 tablet 3    TRULANCE 3 MG TABS TAKE 1 TABLET BY MOUTH ONE TIME A DAY  30 tablet 2    torsemide (DEMADEX) 100 MG tablet TAKE 1 TABLET BY MOUTH IN THE MORNING AND 1/2 TABLET IN THE EVENING  135 tablet 3    ranolazine (RANEXA) 500 MG extended release tablet TAKE 1 TABLET BY MOUTH TWO TIMES A DAY  180 tablet 3    Cholecalciferol (VITAMIN D3) 25 MCG (1000 UT) TABS TAKE 3 TABLETS BY MOUTH ONE TIME A DAY  90 tablet 1    omeprazole (PRILOSEC) 20 MG delayed release capsule TAKE 1 CAPSULE BY MOUTH TWO TIMES A DAY  60 capsule 4    Insulin Degludec (TRESIBA FLEXTOUCH) 200 UNIT/ML SOPN inject 150 units subcutaneously once daily 15 mL 1    insulin aspart (NOVOLOG FLEXPEN) 100 UNIT/ML injection pen inject 50 units subcutaneously three times daily before meals (Patient taking differently: inject 30-50 units subcutaneously three times daily before meals) 45 pen 1    spironolactone (ALDACTONE) 50 MG tablet take 2 tabs by mouth in the morning and 1 tab in the evening 90 tablet 3    leflunomide (ARAVA) 10 MG tablet Take 5 mg by mouth daily       metoprolol tartrate (LOPRESSOR) 25 MG tablet Take 1 tablet by mouth 2 times daily 180 tablet 3    simvastatin (ZOCOR) 20 MG tingling. No change in gait, balance, coordination, mood, affect, memory, mentation, behavior. · Psychiatric: No anxiety, no depression. · Endocrine: No malaise, fatigue or temperature intolerance. No excessive thirst, fluid intake, or urination. No tremor. · Hematologic/Lymphatic: No abnormal bruising or bleeding, blood clots or swollen lymph nodes. · Allergic/Immunologic: No nasal congestion or hives. Physical Examination:    /70   Pulse 85   Ht 5' (1.524 m)   Wt 193 lb (87.5 kg)   SpO2 96%   BMI 37.69 kg/m²       Wt Readings from Last 3 Encounters:   02/12/20 193 lb (87.5 kg)   01/29/20 191 lb 12.8 oz (87 kg)   11/19/19 193 lb (87.5 kg)     BP Readings from Last 3 Encounters:   02/12/20 100/70   01/29/20 112/72   11/19/19 104/62     Constitutional and General Appearance:   WD/WN, less swollen than usual  HEENT:  NC/AT  XANDER  No problems with hearing  Skin:  Warm, dry  Respiratory:  · Normal excursion and expansion without use of accessory muscles  · Resp Auscultation: Normal breath sounds without dullness  Cardiovascular:  · The apical impulses not displaced  · Heart tones are crisp and normal  · Cervical veins are not engorged  · The carotid upstroke is normal in amplitude and contour without delay or bruit  · JVP 8-9 cm H2O  RRR with nl S1 and S2 without m,r,g  · Peripheral pulses are symmetrical and full  · There is no clubbing, cyanosis of the extremities. Bilateral  hand edema is increased. Fingers very swollen.   L>R  · bilateral trace leg edema  · Femoral Arteries: 2+ and equal  · Pedal Pulses: 2+ and equal   Neck:  · No thyromegaly  Abdomen:  abdominal girth soft  · No masses or tenderness  · Liver/Spleen: No Abnormalities Noted  Neurological/Psychiatric:  · Alert and oriented in all spheres  · Moves all extremities well  · Exhibits normal gait balance and coordination  · No abnormalities of mood, affect, memory, mentation, or behavior are noted    Diagnostics:  Left Heart Cath 2/27/18:  Dominance : Right     LM: bifurcating, MLI  LAD: mild plaquing with small vessel disease distally   LCx: no significant disease  RCA: MLI     LVEDP: 25 mmHg   No Ao gradient     Right Heart Cath   RA: 13  RV: 47/6/16  PA:   46/19    and mean of 32  PCWP: 21 mmHg      Sats:  Ao: 92%  RA: 61%  PA: 62% (x 2)   CO/CI : 6.1 L    CXR 1/15/18:  No acute cardiopulmonary disease     Echo 11/8/2017:  Normal left ventricle size and systolic function with an estimated ejection   fraction of 60%. No regional wall motion abnormalities are seen.  Foster Gala is borderline concentric left ventricular hypertrophy.   E/e\"= 13     Stress test 11/8/2017: There is normal isotope uptake at stress and rest. There is no evidence of  myocardial ischemia or scar.  Normal LV function.  Overall findings represent a low risk scan.       VQ scan negative 1/11/2018    CXR 1/16/18  No acute cardiopulmonary disease     Echo 11/8/2017:  Normal left ventricle size and systolic function with an estimated ejection fraction of 60%. No regional wall motion abnormalities are seen. There is borderline concentric left ventricular hypertrophy.   E/e\"= 13     Stress test 11/8/2017: There is normal isotope uptake at stress and rest. There is no evidence of  myocardial ischemia or scar.  Normal LV function.  Overall findings represent a low risk scan.       VQ scan 1/11/2018:  Normal study.  No evidence of pulmonary embolus. Labs were reviewed including labs from other hospital systems through [facility]. Cardiac testing was reviewed including echos, nuclear scans, cardiac catheterization, including from other hospital systems through [facility].     Assessment:  1. Chronic heart failure with preserved ejection fraction ([address]    2. SOB (shortness of breath)    3. Essential hypertension    4. Lymphedema of upper extremity following lymphadenectomy    5. LUIS (obstructive sleep apnea)        1.  Chronic heart failure with preserved ejection

## 2020-02-13 ENCOUNTER — TELEPHONE (OUTPATIENT)
Dept: CARDIOLOGY CLINIC | Age: 50
End: 2020-02-13

## 2020-02-13 NOTE — TELEPHONE ENCOUNTER
Needs to know what the range is for her blood sugars? Today her BS is 235 and the  is telling the nurse that number is in her normal range.  Please call and ask for nurse

## 2020-02-13 NOTE — TELEPHONE ENCOUNTER
Called [facility] and got the [employer] service call line. \"   She advised us to call back tomorrow.

## 2020-02-17 ENCOUNTER — HOSPITAL ENCOUNTER (OUTPATIENT)
Age: 50
Setting detail: SPECIMEN
Discharge: HOME | End: 2020-02-17
Payer: COMMERCIAL

## 2020-02-17 LAB
ANION GAP SERPL CALCULATED.3IONS-SCNC: 14 MMOL/L (ref 3–16)
BUN BLDV-MCNC: 28 MG/DL (ref 7–20)
CALCIUM SERPL-MCNC: 9.3 MG/DL (ref 8.3–10.6)
CHLORIDE BLD-SCNC: 90 MMOL/L (ref 99–110)
CO2: 30 MMOL/L (ref 21–32)
CREAT SERPL-MCNC: 1.4 MG/DL (ref 0.6–1.1)
GFR AFRICAN AMERICAN: 48
GFR NON-AFRICAN AMERICAN: 40
GLUCOSE BLD-MCNC: 244 MG/DL (ref 70–99)
POTASSIUM SERPL-SCNC: 4 MMOL/L (ref 3.5–5.1)
PRO-BNP: 156 PG/ML (ref 0–124)
SODIUM BLD-SCNC: 134 MMOL/L (ref 136–145)

## 2020-02-17 PROCEDURE — 36415 COLL VENOUS BLD VENIPUNCTURE: CPT

## 2020-02-17 PROCEDURE — 83880 ASSAY OF NATRIURETIC PEPTIDE: CPT

## 2020-02-17 PROCEDURE — 80048 BASIC METABOLIC PNL TOTAL CA: CPT

## 2020-02-19 RX ORDER — MELATONIN
Qty: 90 TABLET | Refills: 0 | Status: SHIPPED | OUTPATIENT
Start: 2020-02-19 | End: 2020-03-23

## 2020-02-19 RX ORDER — FERROUS SULFATE 325(65) MG
TABLET ORAL
Qty: 180 TABLET | Refills: 0 | Status: SHIPPED | OUTPATIENT
Start: 2020-02-19 | End: 2020-05-19

## 2020-02-19 RX ORDER — LORATADINE 10 MG/1
TABLET ORAL
Qty: 30 TABLET | Refills: 0 | Status: SHIPPED | OUTPATIENT
Start: 2020-02-19 | End: 2020-04-20

## 2020-02-19 RX ORDER — AMOXICILLIN 250 MG
CAPSULE ORAL
Qty: 120 TABLET | Refills: 0 | Status: SHIPPED | OUTPATIENT
Start: 2020-02-19 | End: 2020-04-20

## 2020-02-20 RX ORDER — MONTELUKAST SODIUM 10 MG/1
TABLET ORAL
Qty: 30 TABLET | Refills: 0 | OUTPATIENT
Start: 2020-02-20

## 2020-02-20 RX ORDER — MONTELUKAST SODIUM 10 MG/1
TABLET ORAL
Qty: 30 TABLET | Refills: 5 | Status: SHIPPED | OUTPATIENT
Start: 2020-02-20 | End: 2020-08-17

## 2020-02-26 ENCOUNTER — OFFICE VISIT (OUTPATIENT)
Dept: ENDOCRINOLOGY | Age: 50
End: 2020-02-26
Payer: COMMERCIAL

## 2020-02-26 VITALS
BODY MASS INDEX: 37.69 KG/M2 | OXYGEN SATURATION: 95 % | DIASTOLIC BLOOD PRESSURE: 75 MMHG | HEIGHT: 60 IN | SYSTOLIC BLOOD PRESSURE: 117 MMHG | RESPIRATION RATE: 16 BRPM | HEART RATE: 91 BPM

## 2020-02-26 PROCEDURE — 3017F COLORECTAL CA SCREEN DOC REV: CPT | Performed by: INTERNAL MEDICINE

## 2020-02-26 PROCEDURE — G8417 CALC BMI ABV UP PARAM F/U: HCPCS | Performed by: INTERNAL MEDICINE

## 2020-02-26 PROCEDURE — 2022F DILAT RTA XM EVC RTNOPTHY: CPT | Performed by: INTERNAL MEDICINE

## 2020-02-26 PROCEDURE — 3046F HEMOGLOBIN A1C LEVEL >9.0%: CPT | Performed by: INTERNAL MEDICINE

## 2020-02-26 PROCEDURE — 1036F TOBACCO NON-USER: CPT | Performed by: INTERNAL MEDICINE

## 2020-02-26 PROCEDURE — G8427 DOCREV CUR MEDS BY ELIG CLIN: HCPCS | Performed by: INTERNAL MEDICINE

## 2020-02-26 PROCEDURE — G8482 FLU IMMUNIZE ORDER/ADMIN: HCPCS | Performed by: INTERNAL MEDICINE

## 2020-02-26 PROCEDURE — 99215 OFFICE O/P EST HI 40 MIN: CPT | Performed by: INTERNAL MEDICINE

## 2020-02-26 NOTE — PROGRESS NOTES
Enzo Kate is a 47 y.o. female is seen for the management of type 2 diabetes and hypothyroidism . Patient has complex medical problems including coronary artery disease , DOMENIC, CHF, breast cancer, fibromyalgia, hyperlipidemia, hypertension, obesity, asthma and depression  T2DM which is uncontrolled and is complicated with nephropathy and DOMENIC . She got diabetic education in the past and at one point she was on an insulin pump. She still has hypoglycemia awareness tends to watch her diet somewhat but her depression hinders in managing her diabetes. Most of her medications are managed by her . Shanti Cho was diagnosed with hypothyroidism in march 2010 and has been on Lt4 since then   she had GDM with her 2 kids ---she has been on insulin for years she has been suffering form DAN and Peripheral neuropathy alonng with Depression and has been on Lyrica & Cymbalta with suoptimal relief she has s/s suggestive of DAN gastroparesis , orthostasis. Shanti Cho also has hypertension , GERD , hyperlipidemia ,she also has breast cancer s/p mastectomy and radiation and chemo &is on Tamixifen   she also has Asthma she is on Cpap for sleep apnea      INTERIM:    Diabetes   She presents for her follow-up diabetic visit. She has type 2 diabetes mellitus. No MedicAlert identification noted. The initial diagnosis of diabetes was made 17 years ago. Her disease course has been stable. Hypoglycemia symptoms include nervousness/anxiousness and sweats. Pertinent negatives for hypoglycemia include no dizziness or headaches. Associated symptoms include fatigue and weakness. There are no hypoglycemic complications. Symptoms are stable. Diabetic complications include autonomic neuropathy, heart disease and peripheral neuropathy. Risk factors for coronary artery disease include diabetes mellitus, post-menopausal, hypertension and dyslipidemia.  Current diabetic treatment includes intensive insulin program. She is obstructive pulmonary disease) ([facility]     Depression     Diabetes mellitus ([facility]     Diabetic polyneuropathy associated with type 2 diabetes mellitus ([facility] 2/2/2018    Dysthymia 2/2/2018    ESBL (extended spectrum beta-lactamase) producing bacteria infection 03/14/2018    urine    Fibromyalgia     Gastric ulcer, unspecified as acute or chronic, without mention of hemorrhage, perforation, or obstruction     GERD (gastroesophageal reflux disease)     Gout     HIGH CHOLESTEROL     Hypertension     Hypothyroidism     Severe persistent asthma without complication 8/9/2610    Thyroid disease     TIA (transient ischemic attack)     with occasional left leg and hand weakness      Patient Active Problem List   Diagnosis    Fibromyalgia    Iron deficiency anemia    Malignant neoplasm of overlapping sites of left female breast ([facility]    Chronic pain    Lymphedema of upper extremity following lymphadenectomy    Encounter for monitoring aromatase inhibitor therapy    Encounter for follow-up surveillance of breast cancer    Hyperlipidemia LDL goal <70    Essential hypertension    Poorly controlled type 2 diabetes mellitus ([address]    Asthma    Hypothyroidism    Anxiety and depression    Hx of breast cancer    Hypoxia    Hypervolemia    SOB (shortness of breath)    Hiatal hernia with GERD    LUIS (obstructive sleep apnea)    Coronary artery disease involving native coronary artery of native heart without angina pectoris    Severe persistent asthma without complication    Diabetic polyneuropathy associated with type 2 diabetes mellitus ([address]    Chronic congestive heart failure ([address]    Chronic heart failure with preserved ejection fraction ([address]    Coronary artery disease due to lipid rich plaque    Renal insufficiency    RA (rheumatoid arthritis) ([address]    Pyelonephritis    History of nephrolithiasis    Immigrant with language difficulty    Class 1 obesity due to excess calories with body mass index (BMI) of 31.0 to 31.9 in adult    H/O TIA (transient ischemic attack) and stroke    Need for isolation    Encounter for medication counseling    Hesitancy of micturition    Weight loss counseling, encounter for    Complex care coordination    Oropharyngeal dysphagia    Constipation due to pain medication    Right hand pain    Fungal dermatitis    Mouth pain    Environmental and seasonal allergies    Hypersomnolence    Enlargement of submandibular gland    Jaw pain    Pain in both lower extremities    Vertigo    Fatigue    Edema of right lower extremity    Neck muscle spasm    Hot flashes    Chest pain    Excessive sweating    Tongue pain     Past Surgical History:   Procedure Laterality Date    BREAST SURGERY      left mastectomy    CARPAL TUNNEL RELEASE      Bilateral    FINGER CONTRACTURE SURGERY      HYSTERECTOMY  2005    USO    TONSILLECTOMY      UPPER GASTROINTESTINAL ENDOSCOPY  2019    stretched esophagous      Social History     Socioeconomic History    Marital status:      Spouse name: Steve Causey Number of children: 3    Years of education: Not on file    Highest education level: Not on file   Occupational History    Occupation: disabled   Social Needs    Financial resource strain: Not on file    Food insecurity:     Worry: Not on file     Inability: Not on file   [employer] needs:     Medical: Not on file     Non-medical: Not on file   Tobacco Use    Smoking status: Never Smoker    Smokeless tobacco: Never Used   Substance and Sexual Activity    Alcohol use: No    Drug use: No    Sexual activity: Not on file   Lifestyle    Physical activity:     Days per week: Not on file     Minutes per session: Not on file    Stress: Not on file   Relationships    Social connections:     Talks on phone: Not on file     Gets together: Not on file     Attends [employer] service: Not on file     Active member of club or organization: Not on file tablet 3    Insulin Degludec (TRESIBA FLEXTOUCH) 200 UNIT/ML SOPN inject 150 units subcutaneously once daily 15 mL 1    blood glucose test strips (FREESTYLE LITE) strip test blood sugar four times daily 200 strip 0    Insulin Pen Needle (B-D UF III MINI PEN NEEDLES) 31G X 5 MM MISC use five times daily with insulin 500 each 5    Insulin Degludec (TRESIBA FLEXTOUCH) 200 UNIT/ML SOPN inject 150 units subcutaneously once daily 15 mL 1    omeprazole (PRILOSEC) 20 MG delayed release capsule TAKE 1 CAPSULE BY MOUTH TWO TIMES A DAY  60 capsule 4    Insulin Degludec (TRESIBA FLEXTOUCH) 200 UNIT/ML SOPN inject 150 units subcutaneously once daily 15 mL 1    Glucagon 1 MG/0.2ML SOSY To be used in case of severe hypoglycemia 2 Syringe 2    insulin aspart (NOVOLOG FLEXPEN) 100 UNIT/ML injection pen inject 50 units subcutaneously three times daily before meals (Patient taking differently: inject 30-50 units subcutaneously three times daily before meals) 45 pen 1    spironolactone (ALDACTONE) 50 MG tablet take 2 tabs by mouth in the morning and 1 tab in the evening 90 tablet 3    leflunomide (ARAVA) 10 MG tablet Take 5 mg by mouth daily       metoprolol tartrate (LOPRESSOR) 25 MG tablet Take 1 tablet by mouth 2 times daily 180 tablet 3    simvastatin (ZOCOR) 20 MG tablet TAKE 1 TABLET BY MOUTH nightly 90 tablet 3    B-D UF III MINI PEN NEEDLES 31G X 5 MM MISC use five times daily with insulin 200 each 2    Ertugliflozin L-PyroglutamicAc (STEGLATRO) 5 MG TABS Take one tab daily 30 tablet 5    levothyroxine (SYNTHROID) 150 MCG tablet Take 1 tablet by mouth every morning (before breakfast) 90 tablet 3    Blood Pressure KIT BP monitoring at home 1 kit 0    ULORIC 40 MG TABS tablet Take 40 mg by mouth daily  2    Blood Glucose Monitoring Suppl (FREESTYLE LITE) MINDY Test blood sugar QID 1 Device 0    metolazone (ZAROXOLYN) 2.5 MG tablet TAKE 1 TABLET BY MOUTH ONE TIME A DAY AS NEEDED 30 tablet 0    polyethylene glycol (GLYCOLAX) powder Take 17 g by mouth nightly 17 g 3    colchicine (COLCRYS) 0.6 MG tablet Take 0.6 mg by mouth 2 times daily       albuterol sulfate  (90 Base) MCG/ACT inhaler Inhale 2 puffs into the lungs every 6 hours as needed for Wheezing (Patient taking differently: Inhale 2 puffs into the lungs 2 times daily ) 1 Inhaler 6    OXYGEN Inhale 2 L into the lungs nightly Sometimes with activity      oxyCODONE (OXYCONTIN) 20 MG extended release tablet Take 20 mg by mouth every 12 hours.  aspirin 81 MG EC tablet Take 81 mg by mouth daily      sulfaSALAzine (AZULFIDINE) 500 MG tablet Take 1,000 mg by mouth 2 times daily       calcium carbonate-vitamin D (CALCIUM 600+D) 600-200 MG-UNIT TABS Take 1 tablet by mouth 2 times daily      benztropine (COGENTIN) 1 MG tablet Take 1 mg by mouth nightly       folic acid (FOLVITE) 1 MG tablet Take 1 mg by mouth daily      lurasidone (LATUDA) 40 MG TABS tablet Take 60 mg by mouth Daily with supper       albuterol (ACCUNEB) 1.25 MG/3ML nebulizer solution Inhale 1 ampule into the lungs every 6 hours as needed for Wheezing      mometasone-formoterol (DULERA) 200-5 MCG/ACT inhaler Inhale 2 puffs into the lungs every 12 hours      fluticasone (FLONASE) 50 MCG/ACT nasal spray 1 spray by Nasal route daily 1 Bottle 0    oxyCODONE-acetaminophen (PERCOCET)  MG per tablet Take 1 tablet by mouth every 4 hours as needed for Pain . Earliest Fill Date: 6/8/17 100 tablet 0    Elastic Bandages & Supports (FUTURO SUPPORT GLOVE MEDIUM) MISC 1 ready made support glove 1 each 2    Compression Sleeve MISC by Does not apply route 1 sleeve, 20-30 mmHg 1 each 2    ALPRAZolam (XANAX) 1 MG tablet Take 1 mg by mouth nightly.  LYRICA 50 MG capsule TAKE 1 CAPSULE BY MOUTH TWICE DAILY (Patient taking differently: 1 tab BID) 60 capsule 0    duloxetine (CYMBALTA) 60 MG capsule Take 60 mg by mouth 2 times daily.          No current facility-administered medications for this visit. Allergies   Allergen Reactions    Iv Dye [Iodides] Anaphylaxis     allergic to ct scan dye, not the MRI    Basaglar Kwikpen [Insulin Glargine]      High blood sugar     Diazepam     Trazodone And Nefazodone Other (See Comments)     Makes patient feel very sluggish     Family Status   Relation Name Status    Other family h/o Alive    Father      Mother  Alive    Sister  Alive    Brother  Alive       Review of Systems  I have reviewed the review of system questionnaire filled by the patient .   Patient was advised to contact PCP for non endocrine signs and symptoms     OBJECTIVE:  /75 (Site: Right Upper Arm, Position: Sitting, Cuff Size: Medium Adult)   Pulse 91   Resp 16   Ht 5' (1.524 m)   SpO2 95%   BMI 37.69 kg/m²    Wt Readings from Last 3 Encounters:   20 193 lb (87.5 kg)   20 191 lb 12.8 oz (87 kg)   19 193 lb (87.5 kg)       Constitutional: no acute distress, well appearing, well nourished  Psychiatric: oriented to person, place and time, judgement, insight and normal, recent and remote memory and intact and mood, affect are normal  Skin: skin and subcutaneous tissue is normal without mass,   Head and Face: examination of head and face revealed no abnormalities  Eyes: no lid or conjunctival swelling, no erythema or discharge, pupils are normal,   Ears/Nose: external inspection of ears and nose revealed no abnormalities, hearing is grossly normal  Oropharynx/Mouth/Face: lips, tongue and gums are normal with no lesions, the voice quality was normal  Neck: neck is supple and symmetric, with midline trachea and no masses   Pulmonary: no increased work of breathing or signs of respiratory distress, lungs are clear to auscultation  Cardiovascular: normal heart rate and rhythm, normal S1 and S2,   Musculoskeletal: normal gait and station,   Neurological: normal coordination, normal general cortical function        Lab Results   Component Value Date percocet     --- Essential hypertension  Controlled now   Continue with current regimen    ----Coronary artery disease involving native coronary artery of native heart without angina pectoris  Stable follows with Dr Magdaleno Saleh     --- Lymphedema of upper extremity following lymphadenectomy  Stable     ---- Dysthymia  Depression   She is on Latuda    Her dose has been adjusted   She appears to be alert and oriented to day but was very tearful    ---Asthma without complication  Stable    ---Diabetic polyneuropathy associated with type 2 diabetes mellitus ([facility]  Stable  She is on Lyrica and was on Cymbalta in the past       ---Rh Arthritis   She is on methotrexate and Arava  Plaquenil is also in her med list     ---[facility]   Follows with cardiology continues to have multiple admission due to worsening of congestive heart failureshe is on demadex , sprinoloactone and metolazone         Reviewed and/or ordered clinical lab results Yes  Reviewed and/or ordered radiology tests Yes  Reviewed and/or ordered other diagnostic tests Yes  Made a decision to obtain old records Yes  Reviewed and summarized old records Yes        Daina Branch was counseled regarding symptoms of current diagnosis, course and complications of disease if inadequately treated, side effects of medications, diagnosis, treatment options, and prognosis, risks, benefits, complications, and alternatives of treatment, labs, imaging and other studies and treatment targets and goals. She understands instructions and counseling. These diagnosis were discussed and reviewed with the patient including the advantages of drug therapy. She was counseled at this visit on the following: diabetes complication prevention and foot care. I spent  50 + minutes with patient and more than 50 % of this time was spent discussing and providing counseling and coordinating care of patient's multiple health issues. Return in about 3 months (around 5/26/2020).

## 2020-02-26 NOTE — LETTER
To whom it may concern   Please send sugar logs to us weekly basis   [address] [facility]   Fax

## 2020-03-02 ENCOUNTER — HOSPITAL ENCOUNTER (OUTPATIENT)
Age: 50
Setting detail: SPECIMEN
Discharge: HOME | End: 2020-03-02
Payer: COMMERCIAL

## 2020-03-02 LAB
ANION GAP SERPL CALCULATED.3IONS-SCNC: 15 MMOL/L (ref 3–16)
BUN BLDV-MCNC: 51 MG/DL (ref 7–20)
CALCIUM SERPL-MCNC: 9.9 MG/DL (ref 8.3–10.6)
CHLORIDE BLD-SCNC: 91 MMOL/L (ref 99–110)
CO2: 28 MMOL/L (ref 21–32)
CREAT SERPL-MCNC: 1.8 MG/DL (ref 0.6–1.1)
GFR AFRICAN AMERICAN: 36
GFR NON-AFRICAN AMERICAN: 30
GLUCOSE BLD-MCNC: 187 MG/DL (ref 70–99)
POTASSIUM SERPL-SCNC: 4.4 MMOL/L (ref 3.5–5.1)
PRO-BNP: 277 PG/ML (ref 0–124)
SODIUM BLD-SCNC: 134 MMOL/L (ref 136–145)
URIC ACID, SERUM: 5.4 MG/DL (ref 2.6–6)

## 2020-03-02 PROCEDURE — 80048 BASIC METABOLIC PNL TOTAL CA: CPT

## 2020-03-02 PROCEDURE — 84550 ASSAY OF BLOOD/URIC ACID: CPT

## 2020-03-02 PROCEDURE — 83880 ASSAY OF NATRIURETIC PEPTIDE: CPT

## 2020-03-02 PROCEDURE — 36415 COLL VENOUS BLD VENIPUNCTURE: CPT

## 2020-03-04 ENCOUNTER — TELEPHONE (OUTPATIENT)
Dept: CARDIOLOGY CLINIC | Age: 50
End: 2020-03-04

## 2020-03-04 NOTE — TELEPHONE ENCOUNTER
----- Message from Radha Ty MD sent at 3/3/2020  4:58 PM EST -----  Please call patient. Labs look okay. No changes.   ARETHA

## 2020-03-09 ENCOUNTER — TELEPHONE (OUTPATIENT)
Dept: CARDIOLOGY CLINIC | Age: 50
End: 2020-03-09

## 2020-03-16 ENCOUNTER — HOSPITAL ENCOUNTER (OUTPATIENT)
Dept: GENERAL RADIOLOGY | Age: 50
Discharge: HOME | End: 2020-03-16
Payer: COMMERCIAL

## 2020-03-16 ENCOUNTER — OFFICE VISIT (OUTPATIENT)
Dept: PULMONOLOGY | Age: 50
End: 2020-03-16
Payer: COMMERCIAL

## 2020-03-16 ENCOUNTER — HOSPITAL ENCOUNTER (OUTPATIENT)
Age: 50
Discharge: HOME | End: 2020-03-16
Payer: COMMERCIAL

## 2020-03-16 VITALS — HEART RATE: 77 BPM | DIASTOLIC BLOOD PRESSURE: 75 MMHG | OXYGEN SATURATION: 95 % | SYSTOLIC BLOOD PRESSURE: 114 MMHG

## 2020-03-16 PROCEDURE — 3017F COLORECTAL CA SCREEN DOC REV: CPT | Performed by: INTERNAL MEDICINE

## 2020-03-16 PROCEDURE — G8482 FLU IMMUNIZE ORDER/ADMIN: HCPCS | Performed by: INTERNAL MEDICINE

## 2020-03-16 PROCEDURE — 1036F TOBACCO NON-USER: CPT | Performed by: INTERNAL MEDICINE

## 2020-03-16 PROCEDURE — 71046 X-RAY EXAM CHEST 2 VIEWS: CPT

## 2020-03-16 PROCEDURE — 99214 OFFICE O/P EST MOD 30 MIN: CPT | Performed by: INTERNAL MEDICINE

## 2020-03-16 PROCEDURE — G9899 SCRN MAM PERF RSLTS DOC: HCPCS | Performed by: INTERNAL MEDICINE

## 2020-03-16 PROCEDURE — G8417 CALC BMI ABV UP PARAM F/U: HCPCS | Performed by: INTERNAL MEDICINE

## 2020-03-16 PROCEDURE — G8427 DOCREV CUR MEDS BY ELIG CLIN: HCPCS | Performed by: INTERNAL MEDICINE

## 2020-03-16 RX ORDER — ALBUTEROL SULFATE 2.5 MG/3ML
2.5 SOLUTION RESPIRATORY (INHALATION) EVERY 6 HOURS PRN
Qty: 120 EACH | Refills: 3 | Status: SHIPPED | OUTPATIENT
Start: 2020-03-16 | End: 2020-10-28 | Stop reason: ALTCHOICE

## 2020-03-16 ASSESSMENT — ENCOUNTER SYMPTOMS
RHINORRHEA: 0
WHEEZING: 1
ABDOMINAL PAIN: 0
BLOOD IN STOOL: 0
COUGH: 0
VOICE CHANGE: 0
SINUS PRESSURE: 0
CHEST TIGHTNESS: 1
ABDOMINAL DISTENTION: 0
SORE THROAT: 0
CONSTIPATION: 0
DIARRHEA: 0
SHORTNESS OF BREATH: 1
ANAL BLEEDING: 0
STRIDOR: 0
BACK PAIN: 0
APNEA: 0
CHOKING: 0

## 2020-03-16 NOTE — PROGRESS NOTES
constipation and diarrhea. Musculoskeletal: Negative for arthralgias, back pain and gait problem. Skin: Negative for pallor and rash. Allergic/Immunologic: Negative for environmental allergies. Neurological: Negative for dizziness, tremors, seizures, syncope, speech difficulty, weakness, light-headedness, numbness and headaches. Hematological: Negative for adenopathy. Does not bruise/bleed easily. Psychiatric/Behavioral: Negative for sleep disturbance. Vitals:    03/16/20 1522   BP: 114/75   Pulse: 77   SpO2: 95%     There is no height or weight on file to calculate BMI. Wt Readings from Last 3 Encounters:   02/12/20 193 lb (87.5 kg)   01/29/20 191 lb 12.8 oz (87 kg)   11/19/19 193 lb (87.5 kg)     BP Readings from Last 3 Encounters:   03/16/20 114/75   02/26/20 117/75   02/12/20 100/70         Physical Exam  Constitutional:       General: She is not in acute distress. Appearance: She is well-developed. She is not diaphoretic. HENT:      Mouth/Throat:      Pharynx: No oropharyngeal exudate. Cardiovascular:      Rate and Rhythm: Normal rate and regular rhythm. Heart sounds: Normal heart sounds. No murmur. Pulmonary:      Effort: No respiratory distress. Breath sounds: Rales present. No wheezing. Chest:      Chest wall: No tenderness. Abdominal:      General: There is no distension. Palpations: There is no mass. Tenderness: There is no abdominal tenderness. There is no guarding or rebound. Musculoskeletal:         General: No swelling, tenderness or deformity. Skin:     Coloration: Skin is not pale. Findings: No erythema or rash. Neurological:      Mental Status: She is alert and oriented to person, place, and time. Cranial Nerves: No cranial nerve deficit. Motor: No abnormal muscle tone.       Coordination: Coordination normal.      Deep Tendon Reflexes: Reflexes normal.             Health Maintenance   Topic Date Due    HIV screen

## 2020-03-17 ENCOUNTER — HOSPITAL ENCOUNTER (OUTPATIENT)
Age: 50
Setting detail: SPECIMEN
Discharge: HOME | End: 2020-03-17
Payer: COMMERCIAL

## 2020-03-17 ENCOUNTER — TELEPHONE (OUTPATIENT)
Dept: CARDIOLOGY CLINIC | Age: 50
End: 2020-03-17

## 2020-03-17 LAB
ALBUMIN SERPL-MCNC: 3.9 G/DL (ref 3.4–5)
ALP BLD-CCNC: 99 U/L (ref 40–129)
ALT SERPL-CCNC: 15 U/L (ref 10–40)
ANION GAP SERPL CALCULATED.3IONS-SCNC: 13 MMOL/L (ref 3–16)
AST SERPL-CCNC: 17 U/L (ref 15–37)
BASOPHILS ABSOLUTE: 0 K/UL (ref 0–0.2)
BASOPHILS RELATIVE PERCENT: 0.3 %
BILIRUB SERPL-MCNC: <0.2 MG/DL (ref 0–1)
BILIRUBIN DIRECT: <0.2 MG/DL (ref 0–0.3)
BILIRUBIN, INDIRECT: NORMAL MG/DL (ref 0–1)
BUN BLDV-MCNC: 50 MG/DL (ref 7–20)
C-REACTIVE PROTEIN: 25.2 MG/L (ref 0–5.1)
CALCIUM SERPL-MCNC: 9.8 MG/DL (ref 8.3–10.6)
CHLORIDE BLD-SCNC: 90 MMOL/L (ref 99–110)
CO2: 28 MMOL/L (ref 21–32)
CREAT SERPL-MCNC: 1.8 MG/DL (ref 0.6–1.1)
EOSINOPHILS ABSOLUTE: 0.2 K/UL (ref 0–0.6)
EOSINOPHILS RELATIVE PERCENT: 1.9 %
GFR AFRICAN AMERICAN: 36
GFR NON-AFRICAN AMERICAN: 30
GLUCOSE BLD-MCNC: 318 MG/DL (ref 70–99)
HCT VFR BLD CALC: 33.8 % (ref 36–48)
HEMOGLOBIN: 11.2 G/DL (ref 12–16)
LYMPHOCYTES ABSOLUTE: 2.1 K/UL (ref 1–5.1)
LYMPHOCYTES RELATIVE PERCENT: 20.3 %
MCH RBC QN AUTO: 29.9 PG (ref 26–34)
MCHC RBC AUTO-ENTMCNC: 33.1 G/DL (ref 31–36)
MCV RBC AUTO: 90.5 FL (ref 80–100)
MONOCYTES ABSOLUTE: 0.7 K/UL (ref 0–1.3)
MONOCYTES RELATIVE PERCENT: 6.6 %
NEUTROPHILS ABSOLUTE: 7.4 K/UL (ref 1.7–7.7)
NEUTROPHILS RELATIVE PERCENT: 70.9 %
PDW BLD-RTO: 14 % (ref 12.4–15.4)
PLATELET # BLD: 217 K/UL (ref 135–450)
PMV BLD AUTO: 11.2 FL (ref 5–10.5)
POTASSIUM SERPL-SCNC: 4.4 MMOL/L (ref 3.5–5.1)
PRO-BNP: 250 PG/ML (ref 0–124)
RBC # BLD: 3.74 M/UL (ref 4–5.2)
SEDIMENTATION RATE, ERYTHROCYTE: 81 MM/HR (ref 0–30)
SODIUM BLD-SCNC: 131 MMOL/L (ref 136–145)
TOTAL PROTEIN: 7.4 G/DL (ref 6.4–8.2)
URIC ACID, SERUM: 5.3 MG/DL (ref 2.6–6)
WBC # BLD: 10.4 K/UL (ref 4–11)

## 2020-03-17 PROCEDURE — 86140 C-REACTIVE PROTEIN: CPT

## 2020-03-17 PROCEDURE — 85025 COMPLETE CBC W/AUTO DIFF WBC: CPT

## 2020-03-17 PROCEDURE — 85652 RBC SED RATE AUTOMATED: CPT

## 2020-03-17 PROCEDURE — 80048 BASIC METABOLIC PNL TOTAL CA: CPT

## 2020-03-17 PROCEDURE — 84550 ASSAY OF BLOOD/URIC ACID: CPT

## 2020-03-17 PROCEDURE — 83880 ASSAY OF NATRIURETIC PEPTIDE: CPT

## 2020-03-17 PROCEDURE — 36415 COLL VENOUS BLD VENIPUNCTURE: CPT

## 2020-03-17 PROCEDURE — 80076 HEPATIC FUNCTION PANEL: CPT

## 2020-03-20 ENCOUNTER — TELEPHONE (OUTPATIENT)
Dept: ENDOCRINOLOGY | Age: 50
End: 2020-03-20

## 2020-03-20 RX ORDER — INSULIN DEGLUDEC 200 U/ML
INJECTION, SOLUTION SUBCUTANEOUS
Qty: 15 ML | Refills: 1 | Status: SHIPPED | OUTPATIENT
Start: 2020-03-20 | End: 2020-04-21 | Stop reason: SDUPTHER

## 2020-03-20 RX ORDER — SPIRONOLACTONE 50 MG/1
TABLET, FILM COATED ORAL
Qty: 90 TABLET | Refills: 3 | Status: SHIPPED | OUTPATIENT
Start: 2020-03-20 | End: 2020-08-17

## 2020-03-23 RX ORDER — MELATONIN
Qty: 90 TABLET | Refills: 0 | Status: SHIPPED | OUTPATIENT
Start: 2020-03-23 | End: 2020-04-21

## 2020-03-23 RX ORDER — ERTUGLIFLOZIN 5 MG/1
TABLET, FILM COATED ORAL
Qty: 30 TABLET | Refills: 0 | Status: SHIPPED | OUTPATIENT
Start: 2020-03-23 | End: 2020-04-21

## 2020-03-24 ENCOUNTER — NURSE TRIAGE (OUTPATIENT)
Dept: OTHER | Facility: CLINIC | Age: 50
End: 2020-03-24

## 2020-03-24 ENCOUNTER — HOSPITAL ENCOUNTER (EMERGENCY)
Age: 50
Discharge: HOME | DRG: 139 | End: 2020-03-25
Attending: EMERGENCY MEDICINE
Payer: COMMERCIAL

## 2020-03-24 PROCEDURE — 93005 ELECTROCARDIOGRAM TRACING: CPT | Performed by: EMERGENCY MEDICINE

## 2020-03-24 PROCEDURE — 99285 EMERGENCY DEPT VISIT HI MDM: CPT

## 2020-03-24 ASSESSMENT — PAIN SCALES - GENERAL: PAINLEVEL_OUTOF10: 8

## 2020-03-24 ASSESSMENT — PAIN DESCRIPTION - LOCATION: LOCATION: GENERALIZED

## 2020-03-24 NOTE — TELEPHONE ENCOUNTER
Patient's  called [facility]) to schedule appointment, with red flag complaint, transferred to RN access for triage. Reports having weakness and dry cough. States having history of asthma and improved with medications. Reports being seen by pulmonologist last week with no issues. States having recently having sore throat, which began today. Denies any fever.  informed of disposition. Care advice as documented. Instructed  to call back with worsening symptoms.  is very worried and unsure of next steps and requesting to speak with PCP directly. Transfer patient to PCP office. Please do not respond to the triage nurse through this encounter. Any subsequent communication should be directly with the patient.     Reason for Disposition   Wheezing is present    Protocols used: [id number]

## 2020-03-25 ENCOUNTER — APPOINTMENT (OUTPATIENT)
Dept: GENERAL RADIOLOGY | Age: 50
DRG: 139 | End: 2020-03-25
Payer: COMMERCIAL

## 2020-03-25 ENCOUNTER — CARE COORDINATION (OUTPATIENT)
Dept: CARE COORDINATION | Age: 50
End: 2020-03-25

## 2020-03-25 ENCOUNTER — TELEPHONE (OUTPATIENT)
Dept: INTERNAL MEDICINE CLINIC | Age: 50
End: 2020-03-25

## 2020-03-25 ENCOUNTER — HOSPITAL ENCOUNTER (INPATIENT)
Age: 50
LOS: 3 days | Discharge: HOME | DRG: 139 | End: 2020-03-28
Attending: EMERGENCY MEDICINE | Admitting: INTERNAL MEDICINE
Payer: COMMERCIAL

## 2020-03-25 VITALS
HEIGHT: 64 IN | HEART RATE: 90 BPM | BODY MASS INDEX: 32.27 KG/M2 | DIASTOLIC BLOOD PRESSURE: 76 MMHG | SYSTOLIC BLOOD PRESSURE: 130 MMHG | OXYGEN SATURATION: 97 % | WEIGHT: 189 LBS | TEMPERATURE: 98.3 F | RESPIRATION RATE: 14 BRPM

## 2020-03-25 PROBLEM — J96.01 ACUTE RESPIRATORY FAILURE WITH HYPOXEMIA (HCC): Status: ACTIVE | Noted: 2020-03-25

## 2020-03-25 PROBLEM — J18.9 PNEUMONIA: Status: ACTIVE | Noted: 2020-03-25

## 2020-03-25 LAB
A/G RATIO: 0.9 (ref 1.1–2.2)
A/G RATIO: 1.1 (ref 1.1–2.2)
ALBUMIN SERPL-MCNC: 3.7 G/DL (ref 3.4–5)
ALBUMIN SERPL-MCNC: 3.7 G/DL (ref 3.4–5)
ALP BLD-CCNC: 103 U/L (ref 40–129)
ALP BLD-CCNC: 107 U/L (ref 40–129)
ALT SERPL-CCNC: 17 U/L (ref 10–40)
ALT SERPL-CCNC: 18 U/L (ref 10–40)
ANION GAP SERPL CALCULATED.3IONS-SCNC: 12 MMOL/L (ref 3–16)
ANION GAP SERPL CALCULATED.3IONS-SCNC: 12 MMOL/L (ref 3–16)
AST SERPL-CCNC: 18 U/L (ref 15–37)
AST SERPL-CCNC: 21 U/L (ref 15–37)
BASOPHILS ABSOLUTE: 0 K/UL (ref 0–0.2)
BASOPHILS ABSOLUTE: 0.1 K/UL (ref 0–0.2)
BASOPHILS RELATIVE PERCENT: 0.5 %
BASOPHILS RELATIVE PERCENT: 0.8 %
BILIRUB SERPL-MCNC: <0.2 MG/DL (ref 0–1)
BILIRUB SERPL-MCNC: <0.2 MG/DL (ref 0–1)
BUN BLDV-MCNC: 25 MG/DL (ref 7–20)
BUN BLDV-MCNC: 29 MG/DL (ref 7–20)
CALCIUM SERPL-MCNC: 9.8 MG/DL (ref 8.3–10.6)
CALCIUM SERPL-MCNC: 9.8 MG/DL (ref 8.3–10.6)
CHLORIDE BLD-SCNC: 98 MMOL/L (ref 99–110)
CHLORIDE BLD-SCNC: 98 MMOL/L (ref 99–110)
CO2: 28 MMOL/L (ref 21–32)
CO2: 28 MMOL/L (ref 21–32)
CREAT SERPL-MCNC: 1.2 MG/DL (ref 0.6–1.1)
CREAT SERPL-MCNC: 1.4 MG/DL (ref 0.6–1.1)
EKG ATRIAL RATE: 80 BPM
EKG DIAGNOSIS: NORMAL
EKG P AXIS: 32 DEGREES
EKG P-R INTERVAL: 138 MS
EKG Q-T INTERVAL: 384 MS
EKG QRS DURATION: 82 MS
EKG QTC CALCULATION (BAZETT): 442 MS
EKG R AXIS: 33 DEGREES
EKG T AXIS: 37 DEGREES
EKG VENTRICULAR RATE: 80 BPM
EOSINOPHILS ABSOLUTE: 0.1 K/UL (ref 0–0.6)
EOSINOPHILS ABSOLUTE: 0.2 K/UL (ref 0–0.6)
EOSINOPHILS RELATIVE PERCENT: 1.1 %
EOSINOPHILS RELATIVE PERCENT: 2.2 %
GFR AFRICAN AMERICAN: 48
GFR AFRICAN AMERICAN: 57
GFR NON-AFRICAN AMERICAN: 40
GFR NON-AFRICAN AMERICAN: 47
GLOBULIN: 3.5 G/DL
GLOBULIN: 4 G/DL
GLUCOSE BLD-MCNC: 228 MG/DL (ref 70–99)
GLUCOSE BLD-MCNC: 235 MG/DL (ref 70–99)
GLUCOSE BLD-MCNC: 287 MG/DL (ref 70–99)
HCT VFR BLD CALC: 34.4 % (ref 36–48)
HCT VFR BLD CALC: 50.5 % (ref 36–48)
HEMOGLOBIN: 11.3 G/DL (ref 12–16)
HEMOGLOBIN: 16.1 G/DL (ref 12–16)
LACTIC ACID, SEPSIS: 1.5 MMOL/L (ref 0.4–1.9)
LYMPHOCYTES ABSOLUTE: 1.2 K/UL (ref 1–5.1)
LYMPHOCYTES ABSOLUTE: 2.3 K/UL (ref 1–5.1)
LYMPHOCYTES RELATIVE PERCENT: 15.2 %
LYMPHOCYTES RELATIVE PERCENT: 29.1 %
MCH RBC QN AUTO: 29.2 PG (ref 26–34)
MCH RBC QN AUTO: 30 PG (ref 26–34)
MCHC RBC AUTO-ENTMCNC: 31.8 G/DL (ref 31–36)
MCHC RBC AUTO-ENTMCNC: 32.9 G/DL (ref 31–36)
MCV RBC AUTO: 91.4 FL (ref 80–100)
MCV RBC AUTO: 91.7 FL (ref 80–100)
MONOCYTES ABSOLUTE: 0.4 K/UL (ref 0–1.3)
MONOCYTES ABSOLUTE: 0.6 K/UL (ref 0–1.3)
MONOCYTES RELATIVE PERCENT: 5.3 %
MONOCYTES RELATIVE PERCENT: 7.8 %
NEUTROPHILS ABSOLUTE: 4.8 K/UL (ref 1.7–7.7)
NEUTROPHILS ABSOLUTE: 5.9 K/UL (ref 1.7–7.7)
NEUTROPHILS RELATIVE PERCENT: 60.1 %
NEUTROPHILS RELATIVE PERCENT: 77.9 %
PDW BLD-RTO: 14 % (ref 12.4–15.4)
PDW BLD-RTO: 14.2 % (ref 12.4–15.4)
PERFORMED ON: ABNORMAL
PLATELET # BLD: 143 K/UL (ref 135–450)
PLATELET # BLD: 229 K/UL (ref 135–450)
PMV BLD AUTO: 10.1 FL (ref 5–10.5)
PMV BLD AUTO: 10.2 FL (ref 5–10.5)
POTASSIUM REFLEX MAGNESIUM: 5.5 MMOL/L (ref 3.5–5.1)
POTASSIUM SERPL-SCNC: 5.1 MMOL/L (ref 3.5–5.1)
PRO-BNP: 127 PG/ML (ref 0–124)
PROCALCITONIN: 0.06 NG/ML (ref 0–0.15)
RAPID INFLUENZA  B AGN: NEGATIVE
RAPID INFLUENZA A AGN: NEGATIVE
RBC # BLD: 3.76 M/UL (ref 4–5.2)
RBC # BLD: 5.51 M/UL (ref 4–5.2)
SODIUM BLD-SCNC: 138 MMOL/L (ref 136–145)
SODIUM BLD-SCNC: 138 MMOL/L (ref 136–145)
TOTAL PROTEIN: 7.2 G/DL (ref 6.4–8.2)
TOTAL PROTEIN: 7.7 G/DL (ref 6.4–8.2)
TROPONIN: <0.01 NG/ML
WBC # BLD: 7.6 K/UL (ref 4–11)
WBC # BLD: 8 K/UL (ref 4–11)

## 2020-03-25 PROCEDURE — 85025 COMPLETE CBC W/AUTO DIFF WBC: CPT

## 2020-03-25 PROCEDURE — 71045 X-RAY EXAM CHEST 1 VIEW: CPT

## 2020-03-25 PROCEDURE — 6360000002 HC RX W HCPCS: Performed by: INTERNAL MEDICINE

## 2020-03-25 PROCEDURE — 83605 ASSAY OF LACTIC ACID: CPT

## 2020-03-25 PROCEDURE — 99285 EMERGENCY DEPT VISIT HI MDM: CPT

## 2020-03-25 PROCEDURE — 84484 ASSAY OF TROPONIN QUANT: CPT

## 2020-03-25 PROCEDURE — 1200000000 HC SEMI PRIVATE

## 2020-03-25 PROCEDURE — 83880 ASSAY OF NATRIURETIC PEPTIDE: CPT

## 2020-03-25 PROCEDURE — 2580000003 HC RX 258: Performed by: INTERNAL MEDICINE

## 2020-03-25 PROCEDURE — 6370000000 HC RX 637 (ALT 250 FOR IP): Performed by: INTERNAL MEDICINE

## 2020-03-25 PROCEDURE — 84145 PROCALCITONIN (PCT): CPT

## 2020-03-25 PROCEDURE — U0002 COVID-19 LAB TEST NON-CDC: HCPCS

## 2020-03-25 PROCEDURE — 87040 BLOOD CULTURE FOR BACTERIA: CPT

## 2020-03-25 PROCEDURE — 93010 ELECTROCARDIOGRAM REPORT: CPT | Performed by: INTERNAL MEDICINE

## 2020-03-25 PROCEDURE — 36415 COLL VENOUS BLD VENIPUNCTURE: CPT

## 2020-03-25 PROCEDURE — 80053 COMPREHEN METABOLIC PANEL: CPT

## 2020-03-25 PROCEDURE — 87804 INFLUENZA ASSAY W/OPTIC: CPT

## 2020-03-25 RX ORDER — PREGABALIN 25 MG/1
50 CAPSULE ORAL 2 TIMES DAILY
Status: DISCONTINUED | OUTPATIENT
Start: 2020-03-25 | End: 2020-03-28 | Stop reason: HOSPADM

## 2020-03-25 RX ORDER — LEVOTHYROXINE SODIUM 0.15 MG/1
150 TABLET ORAL
Status: DISCONTINUED | OUTPATIENT
Start: 2020-03-26 | End: 2020-03-28 | Stop reason: HOSPADM

## 2020-03-25 RX ORDER — SODIUM CHLORIDE 9 MG/ML
INJECTION, SOLUTION INTRAVENOUS CONTINUOUS
Status: DISCONTINUED | OUTPATIENT
Start: 2020-03-25 | End: 2020-03-28 | Stop reason: HOSPADM

## 2020-03-25 RX ORDER — SULFASALAZINE 500 MG/1
1000 TABLET ORAL 2 TIMES DAILY
Status: DISCONTINUED | OUTPATIENT
Start: 2020-03-25 | End: 2020-03-28 | Stop reason: HOSPADM

## 2020-03-25 RX ORDER — LUBIPROSTONE 24 UG/1
24 CAPSULE, GELATIN COATED ORAL
COMMUNITY
End: 2020-12-21 | Stop reason: SDUPTHER

## 2020-03-25 RX ORDER — NITROGLYCERIN 0.4 MG/1
0.4 TABLET SUBLINGUAL EVERY 5 MIN PRN
Status: DISCONTINUED | OUTPATIENT
Start: 2020-03-25 | End: 2020-03-28 | Stop reason: HOSPADM

## 2020-03-25 RX ORDER — SENNA AND DOCUSATE SODIUM 50; 8.6 MG/1; MG/1
2 TABLET, FILM COATED ORAL 2 TIMES DAILY
Status: DISCONTINUED | OUTPATIENT
Start: 2020-03-25 | End: 2020-03-28 | Stop reason: HOSPADM

## 2020-03-25 RX ORDER — SODIUM CHLORIDE 0.9 % (FLUSH) 0.9 %
10 SYRINGE (ML) INJECTION PRN
Status: DISCONTINUED | OUTPATIENT
Start: 2020-03-25 | End: 2020-03-28 | Stop reason: HOSPADM

## 2020-03-25 RX ORDER — PROMETHAZINE HYDROCHLORIDE 25 MG/1
12.5 TABLET ORAL EVERY 6 HOURS PRN
Status: DISCONTINUED | OUTPATIENT
Start: 2020-03-25 | End: 2020-03-28 | Stop reason: HOSPADM

## 2020-03-25 RX ORDER — ACETAMINOPHEN 650 MG/1
650 SUPPOSITORY RECTAL EVERY 6 HOURS PRN
Status: DISCONTINUED | OUTPATIENT
Start: 2020-03-25 | End: 2020-03-28 | Stop reason: HOSPADM

## 2020-03-25 RX ORDER — GLIPIZIDE 10 MG/1
10 TABLET, FILM COATED, EXTENDED RELEASE ORAL 2 TIMES DAILY
COMMUNITY
End: 2020-10-28 | Stop reason: ALTCHOICE

## 2020-03-25 RX ORDER — TORSEMIDE 100 MG/1
100 TABLET ORAL DAILY
Status: DISCONTINUED | OUTPATIENT
Start: 2020-03-26 | End: 2020-03-28 | Stop reason: HOSPADM

## 2020-03-25 RX ORDER — TIZANIDINE 4 MG/1
2 TABLET ORAL EVERY 8 HOURS PRN
Status: DISCONTINUED | OUTPATIENT
Start: 2020-03-25 | End: 2020-03-28 | Stop reason: HOSPADM

## 2020-03-25 RX ORDER — DOXYCYCLINE HYCLATE 100 MG
100 TABLET ORAL 2 TIMES DAILY
Qty: 20 TABLET | Refills: 0 | Status: SHIPPED | OUTPATIENT
Start: 2020-03-25 | End: 2020-04-04

## 2020-03-25 RX ORDER — ALBUTEROL SULFATE 90 UG/1
2 AEROSOL, METERED RESPIRATORY (INHALATION)
Status: DISCONTINUED | OUTPATIENT
Start: 2020-03-25 | End: 2020-03-28

## 2020-03-25 RX ORDER — ASPIRIN 81 MG/1
81 TABLET ORAL DAILY
Status: DISCONTINUED | OUTPATIENT
Start: 2020-03-26 | End: 2020-03-28 | Stop reason: HOSPADM

## 2020-03-25 RX ORDER — INSULIN LISPRO 100 [IU]/ML
30 INJECTION, SOLUTION INTRAVENOUS; SUBCUTANEOUS
Status: DISCONTINUED | OUTPATIENT
Start: 2020-03-25 | End: 2020-03-28 | Stop reason: HOSPADM

## 2020-03-25 RX ORDER — METOLAZONE 2.5 MG/1
2.5 TABLET ORAL DAILY
Status: DISCONTINUED | OUTPATIENT
Start: 2020-03-25 | End: 2020-03-28 | Stop reason: HOSPADM

## 2020-03-25 RX ORDER — FEBUXOSTAT 80 MG/1
80 TABLET, FILM COATED ORAL DAILY
Status: DISCONTINUED | OUTPATIENT
Start: 2020-03-25 | End: 2020-03-25 | Stop reason: CLARIF

## 2020-03-25 RX ORDER — ACETAMINOPHEN 325 MG/1
650 TABLET ORAL EVERY 6 HOURS PRN
Status: DISCONTINUED | OUTPATIENT
Start: 2020-03-25 | End: 2020-03-28 | Stop reason: HOSPADM

## 2020-03-25 RX ORDER — COLCHICINE 0.6 MG/1
0.6 TABLET ORAL 2 TIMES DAILY
Status: DISCONTINUED | OUTPATIENT
Start: 2020-03-25 | End: 2020-03-28 | Stop reason: HOSPADM

## 2020-03-25 RX ORDER — MONTELUKAST SODIUM 10 MG/1
10 TABLET ORAL NIGHTLY
Status: DISCONTINUED | OUTPATIENT
Start: 2020-03-25 | End: 2020-03-28 | Stop reason: HOSPADM

## 2020-03-25 RX ORDER — LEFLUNOMIDE 20 MG/1
10 TABLET ORAL DAILY
Status: DISCONTINUED | OUTPATIENT
Start: 2020-03-26 | End: 2020-03-28 | Stop reason: HOSPADM

## 2020-03-25 RX ORDER — GLIPIZIDE 5 MG/1
10 TABLET ORAL 2 TIMES DAILY WITH MEALS
Status: DISCONTINUED | OUTPATIENT
Start: 2020-03-25 | End: 2020-03-28 | Stop reason: HOSPADM

## 2020-03-25 RX ORDER — DULOXETIN HYDROCHLORIDE 60 MG/1
60 CAPSULE, DELAYED RELEASE ORAL 2 TIMES DAILY
Status: DISCONTINUED | OUTPATIENT
Start: 2020-03-25 | End: 2020-03-28 | Stop reason: HOSPADM

## 2020-03-25 RX ORDER — ONDANSETRON 2 MG/ML
4 INJECTION INTRAMUSCULAR; INTRAVENOUS EVERY 6 HOURS PRN
Status: DISCONTINUED | OUTPATIENT
Start: 2020-03-25 | End: 2020-03-28 | Stop reason: HOSPADM

## 2020-03-25 RX ORDER — SPIRONOLACTONE 25 MG/1
50 TABLET ORAL 2 TIMES DAILY
Status: DISCONTINUED | OUTPATIENT
Start: 2020-03-25 | End: 2020-03-28 | Stop reason: HOSPADM

## 2020-03-25 RX ORDER — POTASSIUM CHLORIDE 7.45 MG/ML
10 INJECTION INTRAVENOUS PRN
Status: DISCONTINUED | OUTPATIENT
Start: 2020-03-25 | End: 2020-03-28 | Stop reason: HOSPADM

## 2020-03-25 RX ORDER — PREGABALIN 50 MG/1
50 CAPSULE ORAL 2 TIMES DAILY
COMMUNITY
End: 2022-03-14 | Stop reason: SDUPTHER

## 2020-03-25 RX ORDER — OYSTER SHELL CALCIUM WITH VITAMIN D 500; 200 MG/1; [IU]/1
1 TABLET, FILM COATED ORAL 2 TIMES DAILY
Status: DISCONTINUED | OUTPATIENT
Start: 2020-03-25 | End: 2020-03-28 | Stop reason: HOSPADM

## 2020-03-25 RX ORDER — PANTOPRAZOLE SODIUM 40 MG/1
40 TABLET, DELAYED RELEASE ORAL
Status: DISCONTINUED | OUTPATIENT
Start: 2020-03-25 | End: 2020-03-28 | Stop reason: HOSPADM

## 2020-03-25 RX ORDER — RANOLAZINE 500 MG/1
500 TABLET, EXTENDED RELEASE ORAL 2 TIMES DAILY
Status: DISCONTINUED | OUTPATIENT
Start: 2020-03-25 | End: 2020-03-28 | Stop reason: HOSPADM

## 2020-03-25 RX ORDER — INSULIN ASPART 100 [IU]/ML
30-50 INJECTION, SOLUTION INTRAVENOUS; SUBCUTANEOUS
COMMUNITY
End: 2020-06-03 | Stop reason: SDUPTHER

## 2020-03-25 RX ORDER — ATORVASTATIN CALCIUM 10 MG/1
10 TABLET, FILM COATED ORAL NIGHTLY
Status: DISCONTINUED | OUTPATIENT
Start: 2020-03-25 | End: 2020-03-28 | Stop reason: HOSPADM

## 2020-03-25 RX ORDER — SODIUM CHLORIDE 0.9 % (FLUSH) 0.9 %
10 SYRINGE (ML) INJECTION EVERY 12 HOURS SCHEDULED
Status: DISCONTINUED | OUTPATIENT
Start: 2020-03-25 | End: 2020-03-28 | Stop reason: HOSPADM

## 2020-03-25 RX ORDER — IPRATROPIUM BROMIDE AND ALBUTEROL SULFATE 2.5; .5 MG/3ML; MG/3ML
1 SOLUTION RESPIRATORY (INHALATION)
Status: DISCONTINUED | OUTPATIENT
Start: 2020-03-25 | End: 2020-03-25

## 2020-03-25 RX ORDER — OXYCODONE HCL 20 MG/1
20 TABLET, FILM COATED, EXTENDED RELEASE ORAL EVERY 12 HOURS
Status: DISCONTINUED | OUTPATIENT
Start: 2020-03-25 | End: 2020-03-28 | Stop reason: HOSPADM

## 2020-03-25 RX ORDER — INSULIN LISPRO 100 [IU]/ML
0-12 INJECTION, SOLUTION INTRAVENOUS; SUBCUTANEOUS
Status: DISCONTINUED | OUTPATIENT
Start: 2020-03-25 | End: 2020-03-28 | Stop reason: HOSPADM

## 2020-03-25 RX ORDER — DEXTROSE MONOHYDRATE 25 G/50ML
12.5 INJECTION, SOLUTION INTRAVENOUS PRN
Status: DISCONTINUED | OUTPATIENT
Start: 2020-03-25 | End: 2020-03-28 | Stop reason: HOSPADM

## 2020-03-25 RX ORDER — ALBUTEROL SULFATE 90 UG/1
2 AEROSOL, METERED RESPIRATORY (INHALATION) EVERY 6 HOURS PRN
Status: DISCONTINUED | OUTPATIENT
Start: 2020-03-25 | End: 2020-03-28 | Stop reason: HOSPADM

## 2020-03-25 RX ORDER — ALBUTEROL SULFATE 90 UG/1
2 AEROSOL, METERED RESPIRATORY (INHALATION)
Status: DISCONTINUED | OUTPATIENT
Start: 2020-03-25 | End: 2020-03-25

## 2020-03-25 RX ORDER — 0.9 % SODIUM CHLORIDE 0.9 %
500 INTRAVENOUS SOLUTION INTRAVENOUS PRN
Status: DISCONTINUED | OUTPATIENT
Start: 2020-03-25 | End: 2020-03-28 | Stop reason: HOSPADM

## 2020-03-25 RX ORDER — FOLIC ACID 1 MG/1
1 TABLET ORAL DAILY
Status: DISCONTINUED | OUTPATIENT
Start: 2020-03-26 | End: 2020-03-28 | Stop reason: HOSPADM

## 2020-03-25 RX ORDER — FEBUXOSTAT 80 MG/1
80 TABLET, FILM COATED ORAL DAILY
COMMUNITY
End: 2021-08-10 | Stop reason: SDUPTHER

## 2020-03-25 RX ORDER — CETIRIZINE HYDROCHLORIDE 10 MG/1
10 TABLET ORAL DAILY
Status: DISCONTINUED | OUTPATIENT
Start: 2020-03-26 | End: 2020-03-28 | Stop reason: HOSPADM

## 2020-03-25 RX ORDER — ALPRAZOLAM 0.5 MG/1
1 TABLET ORAL NIGHTLY
Status: DISCONTINUED | OUTPATIENT
Start: 2020-03-25 | End: 2020-03-28 | Stop reason: HOSPADM

## 2020-03-25 RX ORDER — LUBIPROSTONE 24 UG/1
24 CAPSULE, GELATIN COATED ORAL
Status: DISCONTINUED | OUTPATIENT
Start: 2020-03-26 | End: 2020-03-28 | Stop reason: HOSPADM

## 2020-03-25 RX ORDER — ALBUTEROL SULFATE 2.5 MG/3ML
2.5 SOLUTION RESPIRATORY (INHALATION) EVERY 6 HOURS PRN
Status: DISCONTINUED | OUTPATIENT
Start: 2020-03-25 | End: 2020-03-25

## 2020-03-25 RX ORDER — DOXYCYCLINE HYCLATE 100 MG
100 TABLET ORAL 2 TIMES DAILY
Status: DISCONTINUED | OUTPATIENT
Start: 2020-03-25 | End: 2020-03-25 | Stop reason: ALTCHOICE

## 2020-03-25 RX ORDER — DEXTROSE MONOHYDRATE 50 MG/ML
100 INJECTION, SOLUTION INTRAVENOUS PRN
Status: DISCONTINUED | OUTPATIENT
Start: 2020-03-25 | End: 2020-03-28 | Stop reason: HOSPADM

## 2020-03-25 RX ORDER — NICOTINE POLACRILEX 4 MG
15 LOZENGE BUCCAL PRN
Status: DISCONTINUED | OUTPATIENT
Start: 2020-03-25 | End: 2020-03-28 | Stop reason: HOSPADM

## 2020-03-25 RX ORDER — OXYCODONE AND ACETAMINOPHEN 10; 325 MG/1; MG/1
1 TABLET ORAL EVERY 4 HOURS PRN
Status: DISCONTINUED | OUTPATIENT
Start: 2020-03-25 | End: 2020-03-28 | Stop reason: HOSPADM

## 2020-03-25 RX ORDER — HYDRALAZINE HYDROCHLORIDE 20 MG/ML
10 INJECTION INTRAMUSCULAR; INTRAVENOUS EVERY 6 HOURS PRN
Status: DISCONTINUED | OUTPATIENT
Start: 2020-03-25 | End: 2020-03-28 | Stop reason: HOSPADM

## 2020-03-25 RX ORDER — FERROUS SULFATE 325(65) MG
325 TABLET ORAL 2 TIMES DAILY WITH MEALS
Status: DISCONTINUED | OUTPATIENT
Start: 2020-03-25 | End: 2020-03-28 | Stop reason: HOSPADM

## 2020-03-25 RX ORDER — BENZTROPINE MESYLATE 1 MG/1
1 TABLET ORAL NIGHTLY
Status: DISCONTINUED | OUTPATIENT
Start: 2020-03-25 | End: 2020-03-28 | Stop reason: HOSPADM

## 2020-03-25 RX ORDER — INSULIN LISPRO 100 [IU]/ML
0-6 INJECTION, SOLUTION INTRAVENOUS; SUBCUTANEOUS NIGHTLY
Status: DISCONTINUED | OUTPATIENT
Start: 2020-03-25 | End: 2020-03-28 | Stop reason: HOSPADM

## 2020-03-25 RX ORDER — POTASSIUM CHLORIDE 20 MEQ/1
40 TABLET, EXTENDED RELEASE ORAL PRN
Status: DISCONTINUED | OUTPATIENT
Start: 2020-03-25 | End: 2020-03-28 | Stop reason: HOSPADM

## 2020-03-25 RX ADMIN — BENZTROPINE MESYLATE 1 MG: 1 TABLET ORAL at 22:10

## 2020-03-25 RX ADMIN — METOPROLOL TARTRATE 25 MG: 25 TABLET, FILM COATED ORAL at 22:11

## 2020-03-25 RX ADMIN — ALPRAZOLAM 1 MG: 0.5 TABLET ORAL at 22:12

## 2020-03-25 RX ADMIN — LURASIDONE HYDROCHLORIDE 60 MG: 40 TABLET, FILM COATED ORAL at 22:10

## 2020-03-25 RX ADMIN — ENOXAPARIN SODIUM 40 MG: 40 INJECTION SUBCUTANEOUS at 22:13

## 2020-03-25 RX ADMIN — ACETAMINOPHEN 650 MG: 325 TABLET, FILM COATED ORAL at 21:16

## 2020-03-25 RX ADMIN — MONTELUKAST SODIUM 10 MG: 10 TABLET, FILM COATED ORAL at 22:10

## 2020-03-25 RX ADMIN — COLCHICINE 0.6 MG: 0.6 TABLET, FILM COATED ORAL at 22:12

## 2020-03-25 RX ADMIN — PANTOPRAZOLE SODIUM 40 MG: 40 TABLET, DELAYED RELEASE ORAL at 22:12

## 2020-03-25 RX ADMIN — INSULIN LISPRO 2 UNITS: 100 INJECTION, SOLUTION INTRAVENOUS; SUBCUTANEOUS at 23:07

## 2020-03-25 RX ADMIN — SENNOSIDES AND DOCUSATE SODIUM 2 TABLET: 8.6; 5 TABLET ORAL at 22:11

## 2020-03-25 RX ADMIN — RANOLAZINE 500 MG: 500 TABLET, FILM COATED, EXTENDED RELEASE ORAL at 22:10

## 2020-03-25 RX ADMIN — SULFASALAZINE 1000 MG: 500 TABLET ORAL at 22:10

## 2020-03-25 RX ADMIN — FERROUS SULFATE TAB 325 MG (65 MG ELEMENTAL FE) 325 MG: 325 (65 FE) TAB at 22:12

## 2020-03-25 RX ADMIN — PREGABALIN 50 MG: 25 CAPSULE ORAL at 22:11

## 2020-03-25 RX ADMIN — AZITHROMYCIN MONOHYDRATE 500 MG: 500 INJECTION, POWDER, LYOPHILIZED, FOR SOLUTION INTRAVENOUS at 22:14

## 2020-03-25 RX ADMIN — OXYCODONE HYDROCHLORIDE 20 MG: 20 TABLET, FILM COATED, EXTENDED RELEASE ORAL at 22:11

## 2020-03-25 RX ADMIN — Medication 1 G: at 21:37

## 2020-03-25 RX ADMIN — DULOXETINE HYDROCHLORIDE 60 MG: 60 CAPSULE, DELAYED RELEASE ORAL at 22:11

## 2020-03-25 RX ADMIN — SPIRONOLACTONE 50 MG: 25 TABLET ORAL at 22:09

## 2020-03-25 RX ADMIN — SODIUM CHLORIDE: 9 INJECTION, SOLUTION INTRAVENOUS at 21:36

## 2020-03-25 RX ADMIN — ATORVASTATIN CALCIUM 10 MG: 10 TABLET, FILM COATED ORAL at 22:11

## 2020-03-25 RX ADMIN — GLIPIZIDE 10 MG: 5 TABLET ORAL at 22:10

## 2020-03-25 ASSESSMENT — PAIN DESCRIPTION - PAIN TYPE
TYPE: ACUTE PAIN

## 2020-03-25 ASSESSMENT — ENCOUNTER SYMPTOMS
EYE PAIN: 0
NAUSEA: 0
ABDOMINAL PAIN: 0
CHEST TIGHTNESS: 1
DIARRHEA: 0
BLOOD IN STOOL: 0
EYE REDNESS: 0
VOMITING: 0
ABDOMINAL DISTENTION: 0
SHORTNESS OF BREATH: 1
COUGH: 1

## 2020-03-25 ASSESSMENT — PAIN DESCRIPTION - DESCRIPTORS
DESCRIPTORS: HEADACHE
DESCRIPTORS: ACHING
DESCRIPTORS: HEADACHE

## 2020-03-25 ASSESSMENT — PAIN SCALES - GENERAL
PAINLEVEL_OUTOF10: 9
PAINLEVEL_OUTOF10: 5
PAINLEVEL_OUTOF10: 9

## 2020-03-25 ASSESSMENT — PAIN DESCRIPTION - LOCATION
LOCATION: HEAD
LOCATION: GENERALIZED
LOCATION: HEAD

## 2020-03-25 NOTE — ED PROVIDER NOTES
[facility] Emergency Department    CHIEF COMPLAINT  Chief Complaint   Patient presents with    Shortness of Breath     Patient with SOB, dizziness, and headache today. Has needed increased O2 for the past week, normally on 2L, now on 3L.  states pt has also been experiencing diarrhea and dry cough. HISTORY OF PRESENT ILLNESS  Crow Denise is a 45 y.o. female  who presents to the ED complaining of headache, malaise, fatigue and lightheadedness. She feels very SOB as one of her primary complaints. She cannot hardly ambulate due to her dyspnea. She is on 2L NC oxygen normally due to COPD and CHF history. She has had to increase her oxygen needs because of the SOB. No fevers. She is coughing but it is dry. This is a fairly chronic stable cough for her. She also has some diarrhea. No abdominal pains or vomiting. No other complaints, modifying factors or associated symptoms. I have reviewed the following from the nursing documentation.     Past Medical History:   Diagnosis Date    Anxiety     Anxiety and depression     Arthritis     not sure of specific type    Asthma     CAD (coronary artery disease)     Cancer ([address] 2007    left breast    Cerebral artery occlusion with cerebral infarction ([address]     CHF (congestive heart failure) ([address] 2018    Chronic kidney disease     renal insufficency/to see Dr Uziel Flores    Chronic pain     Constipation     COPD (chronic obstructive pulmonary disease) ([address]     Depression     Diabetes mellitus ([address]     Diabetic polyneuropathy associated with type 2 diabetes mellitus ([address] 2/2/2018    Dysthymia 2/2/2018    ESBL (extended spectrum beta-lactamase) producing bacteria infection 03/14/2018    urine    Fibromyalgia     Gastric ulcer, unspecified as acute or chronic, without mention of hemorrhage, perforation, or obstruction     GERD (gastroesophageal reflux disease)     Gout     HIGH CHOLESTEROL     tablet Take 20 mg by mouth every 12 hours.  aspirin 81 MG EC tablet Take 81 mg by mouth daily      sulfaSALAzine (AZULFIDINE) 500 MG tablet Take 1,000 mg by mouth 2 times daily       calcium carbonate-vitamin D (CALCIUM 600+D) 600-200 MG-UNIT TABS Take 1 tablet by mouth 2 times daily      benztropine (COGENTIN) 1 MG tablet Take 1 mg by mouth nightly       folic acid (FOLVITE) 1 MG tablet Take 1 mg by mouth daily      lurasidone (LATUDA) 40 MG TABS tablet Take 60 mg by mouth Daily with supper       albuterol (ACCUNEB) 1.25 MG/3ML nebulizer solution Inhale 1 ampule into the lungs every 6 hours as needed for Wheezing      mometasone-formoterol (DULERA) 200-5 MCG/ACT inhaler Inhale 2 puffs into the lungs every 12 hours      fluticasone (FLONASE) 50 MCG/ACT nasal spray 1 spray by Nasal route daily 1 Bottle 0    oxyCODONE-acetaminophen (PERCOCET)  MG per tablet Take 1 tablet by mouth every 4 hours as needed for Pain . Earliest Fill Date: 6/8/17 100 tablet 0    Elastic Bandages & Supports (FUTURO SUPPORT GLOVE MEDIUM) MISC 1 ready made support glove 1 each 2    Compression Sleeve MISC by Does not apply route 1 sleeve, 20-30 mmHg 1 each 2    ALPRAZolam (XANAX) 1 MG tablet Take 1 mg by mouth nightly.  LYRICA 50 MG capsule TAKE 1 CAPSULE BY MOUTH TWICE DAILY (Patient taking differently: 1 tab BID) 60 capsule 0    duloxetine (CYMBALTA) 60 MG capsule Take 60 mg by mouth 2 times daily. Allergies   Allergen Reactions    Iv Dye [Iodides] Anaphylaxis     allergic to ct scan dye, not the MRI    Basaglar Kwikpen [Insulin Glargine]      High blood sugar     Diazepam     Trazodone And Nefazodone Other (See Comments)     Makes patient feel very sluggish       REVIEW OF SYSTEMS  10 systems reviewed, pertinent positives per HPI otherwise noted to be negative.     PHYSICAL EXAM  /76   Pulse 88   Temp 98.3 °F (36.8 °C) (Oral)   Resp 14   Ht 5' 4\" (1.626 m)   Wt 189 lb (85.7 kg)   SpO2 96% (H) 70 - 99 mg/dL    BUN 29 (H) 7 - 20 mg/dL    CREATININE 1.4 (H) 0.6 - 1.1 mg/dL    GFR Non- 40 (A) >60    GFR  48 (A) >60    Calcium 9.8 8.3 - 10.6 mg/dL    Total Protein 7.2 6.4 - 8.2 g/dL    Alb 3.7 3.4 - 5.0 g/dL    Albumin/Globulin Ratio 1.1 1.1 - 2.2    Total Bilirubin <0.2 0.0 - 1.0 mg/dL    Alkaline Phosphatase 103 40 - 129 U/L    ALT 17 10 - 40 U/L    AST 18 15 - 37 U/L    Globulin 3.5 g/dL   Troponin   Result Value Ref Range    Troponin <0.01 <0.01 ng/mL   Brain Natriuretic Peptide   Result Value Ref Range    Pro- (H) 0 - 124 pg/mL   EKG 12 Lead   Result Value Ref Range    Ventricular Rate 80 BPM    Atrial Rate 80 BPM    P-R Interval 138 ms    QRS Duration 82 ms    Q-T Interval 384 ms    QTc Calculation (Bazett) 442 ms    P Axis 32 degrees    R Axis 33 degrees    T Axis 37 degrees    Diagnosis Normal sinus rhythmNormal ECG      The 12 lead EKG was interpreted by me as follows:  Rate: normal with a rate of 80  Rhythm: sinus  Axis: normal  Intervals: normal MI, narrow QRS, normal QTc  ST segments: no ST elevations or depressions  T waves: no abnormal inversions  Non-specific T wave changes: not present  Prior EKG comparison: EKG dated 9/12/18 is not significantly different      RADIOLOGY    Xr Chest Standard (2 Vw)    Result Date: 3/16/2020  EXAMINATION: TWO XRAY VIEWS OF THE CHEST 3/16/2020 4:10 pm COMPARISON: 09/11/2018 HISTORY: ORDERING SYSTEM PROVIDED HISTORY: Acute on chronic diastolic congestive heart failure (HCC) TECHNOLOGIST PROVIDED HISTORY: Reason for Exam: chronic diastolic congestive heart failure Acuity: Acute Type of Exam: Initial FINDINGS: There is patchy right basilar airspace disease. The left lung is clear. Heart size and vascularity are stable. Right-sided Port-A-Cath is unchanged in position. The pleural spaces are clear. Mild right basilar segmental atelectasis versus pneumonia.      Xr Chest Portable    Result Date: 3/25/2020  EXAMINATION:

## 2020-03-25 NOTE — ED NOTES
Discussed plan of care with patients  over the phone. All questions answered.       Gino Mishra RN  03/25/20 [id number]

## 2020-03-25 NOTE — ED PROVIDER NOTES
returned as of this dictation. EKG: All EKG's are interpreted by the Emergency Department Physician in the absence of a cardiologist.  Please see their note for interpretation of EKG. RADIOLOGY:   Non-plain film images such as CT, Ultrasound and MRI are read by the radiologist. Plain radiographic images are visualized and preliminarily interpreted by the ED Provider with the below findings:        Interpretation per the Radiologist below, if available at the time of this note:    XR CHEST PORTABLE   Final Result   1. No significant change. XR CHEST STANDARD (2 VW)    (Results Pending)     Xr Chest Portable    Result Date: 3/25/2020  EXAMINATION: ONE XRAY VIEW OF THE CHEST 3/25/2020 12:19 am COMPARISON: 03/16/2020 HISTORY: ORDERING SYSTEM PROVIDED HISTORY: sob TECHNOLOGIST PROVIDED HISTORY: Reason for exam:->sob Reason for Exam: Shortness of Breath (Patient with SOB, dizziness, and headache today. Has needed increased O2 for the past week, normally on 2L, now on 3L.  states pt has also been experiencing diarrhea and dry cough. ) Acuity: Acute Type of Exam: Initial FINDINGS: Extensive artifact projects over the patient, presumably on the patient's clothing. Lung volumes are low. There are bibasilar airspace opacities. The heart size is at the upper limits of normal.  There is no large pleural effusion or definite evidence for pneumothorax. Right chest port is again seen. Bibasilar atelectasis or pneumonia.            PROCEDURES   Unless otherwise noted below, none     Procedures    CRITICAL CARE TIME   N/A    CONSULTS:  None      EMERGENCY DEPARTMENT COURSE and DIFFERENTIAL DIAGNOSIS/MDM:   Vitals:    Vitals:    03/25/20 1514 03/25/20 1626 03/25/20 1700 03/25/20 1850   BP:  (!) 104/54 109/71 118/69   Pulse:    79   Resp:    16   Temp:    97.8 °F (36.6 °C)   TempSrc:    Oral   SpO2: 96% 98% 96% 95%   Weight:    190 lb (86.2 kg)   Height:    5' 4\" (1.626 m)       Patient was given the following medications:  Medications   DULoxetine (CYMBALTA) extended release capsule 60 mg (has no administration in time range)   ALPRAZolam (XANAX) tablet 1 mg (has no administration in time range)   oxyCODONE-acetaminophen (PERCOCET)  MG per tablet 1 tablet (has no administration in time range)   aspirin EC tablet 81 mg (has no administration in time range)   sulfaSALAzine (AZULFIDINE) tablet 1,000 mg (has no administration in time range)   calcium-vitamin D (OSCAL-500) 500-200 MG-UNIT per tablet 1 tablet (has no administration in time range)   benztropine (COGENTIN) tablet 1 mg (has no administration in time range)   folic acid (FOLVITE) tablet 1 mg (has no administration in time range)   lurasidone (LATUDA) tablet 60 mg (has no administration in time range)   oxyCODONE (OXYCONTIN) extended release tablet 20 mg (has no administration in time range)   albuterol sulfate  (90 Base) MCG/ACT inhaler 2 puff (has no administration in time range)   colchicine (COLCRYS) tablet 0.6 mg (has no administration in time range)   metOLazone (ZAROXOLYN) tablet 2.5 mg (has no administration in time range)   levothyroxine (SYNTHROID) tablet 150 mcg (has no administration in time range)   metoprolol tartrate (LOPRESSOR) tablet 25 mg (has no administration in time range)   atorvastatin (LIPITOR) tablet 10 mg (has no administration in time range)   leflunomide (ARAVA) tablet 10 mg (has no administration in time range)   pantoprazole (PROTONIX) tablet 40 mg (has no administration in time range)   torsemide (DEMADEX) tablet 100 mg (has no administration in time range)   ranolazine (RANEXA) extended release tablet 500 mg (has no administration in time range)   tiZANidine (ZANAFLEX) tablet 2 mg (has no administration in time range)   nitroGLYCERIN (NITROSTAT) SL tablet 0.4 mg (has no administration in time range)   ferrous sulfate (IRON 325) tablet 325 mg (has no administration in time range)   cetirizine (ZYRTEC) tablet 10 administration in time range)   glucagon (rDNA) injection 1 mg (has no administration in time range)   dextrose 5 % solution (has no administration in time range)   potassium chloride (KLOR-CON M) extended release tablet 40 mEq (has no administration in time range)     Or   potassium bicarb-citric acid (EFFER-K) effervescent tablet 40 mEq (has no administration in time range)     Or   potassium chloride 10 mEq/100 mL IVPB (Peripheral Line) (has no administration in time range)   0.9 % sodium chloride bolus (has no administration in time range)   hydrALAZINE (APRESOLINE) injection 10 mg (has no administration in time range)   albuterol sulfate  (90 Base) MCG/ACT inhaler 2 puff (has no administration in time range)   ipratropium (ATROVENT HFA) 17 MCG/ACT inhaler 2 puff (has no administration in time range)       Briefly, this is a 45year old female that presents to the emergency department with repeat visit from yesterday. The patient was seen here yesterday with possible coronavirus, she was discharged home and told to self isolate. She was discharged with doxycycline after being found to have bibasilar pneumonia on chest film. She does have history of supplemental oxygen use with COPD. Patient states that her breathing today is worse and she is unable to ambulate due to extreme shortness of breath. She reports taking Tylenol this morning with documented fever. She reports fatigue, headache, chills, body aches, shortness of breath, and dry cough. No mitigating or exacerbating factors. Constant since time of onset. States that she is been sick for about 3 days. Decreased renal function. XR CHEST PORTABLE (Final result)   Result time 03/25/20 15:54:56   Final result by Aime Rodriguez MD (03/25/20 15:54:56)                Impression:    1. No significant change. Labs were otherwise unremarkable. She will be admitted for presumed COVID and respiratory failure.   Dr. David Feliciano

## 2020-03-25 NOTE — ED NOTES
Pharmacy Medication History Note      List of current medications patient is taking is complete. Source of information: [address] [place] made to medication list:  Medications flagged for removal (include reason, ex. noncompliance):  none    Medications removed (include reason, ex. therapy complete or physician discontinued):  See list below    Medications added/doses adjusted:  See list below    Other notes (ex. Recent course of antibiotics, Coumadin dosing):  Denies use of other OTC or herbal medications. Last dose times updated. Kofi Byrd, PharmD  ED Pharmacist [id number]  3/25/2020      No current facility-administered medications on file prior to encounter. Current Outpatient Medications on File Prior to Encounter   Medication Sig Dispense Refill    doxycycline hyclate (VIBRA-TABS) 100 MG tablet Take 1 tablet by mouth 2 times daily for 10 days 20 tablet 0    Febuxostat 80 MG TABS Take 80 mg by mouth daily      glipiZIDE (GLUCOTROL XL) 10 MG extended release tablet Take 10 mg by mouth 2 times daily      pregabalin (LYRICA) 50 MG capsule Take 50 mg by mouth 2 times daily.       lubiprostone (AMITIZA) 24 MCG capsule Take 24 mcg by mouth daily (with breakfast)      insulin aspart (NOVOLOG FLEXPEN) 100 UNIT/ML injection pen Inject 30-50 Units into the skin 3 times daily (before meals)      Ertugliflozin L-PyroglutamicAc (STEGLATRO) 5 MG TABS TAKE 1 TABLET BY MOUTH ONE TIME A DAY 30 tablet 0    Cholecalciferol (VITAMIN D3) 25 MCG (1000 UT) TABS TAKE 3 TABLETS BY MOUTH ONE TIME A DAY  90 tablet 0    spironolactone (ALDACTONE) 50 MG tablet TAKE 2 TABLETS BY MOUTH in the morning and 1 tablet by mouth once in the evening 90 tablet 3    Insulin Degludec (TRESIBA FLEXTOUCH) 200 UNIT/ML SOPN inject 150 units subcutaneously once daily 15 mL 1    albuterol (PROVENTIL) (2.5 MG/3ML) 0.083% nebulizer solution Take 3 mLs by nebulization every 6 hours as needed for Wheezing 120 each 3    montelukast

## 2020-03-25 NOTE — H&P
History and Physical  Dr. Marion Link  3/25/2020    PCP: Natalie Santiago DO    Cc:   Chief Complaint   Patient presents with    Cough     pt was here yesterday for \"COVID\". Pt was d/c home and told to self isolate, pt has HHN at home. No fever, no change. States she was called and checked on by [facility] and was told because she did not feel any better to come back and be tested for COVID       HPI:  Deloris Bradford is a 45 y.o. female who has a past medical history of Anxiety, Anxiety and depression, Arthritis, Asthma, CAD (coronary artery disease), Cancer ([address] Cerebral artery occlusion with cerebral infarction ([facility] CHF (congestive heart failure) ([facility] Chronic kidney disease, Chronic pain, Constipation, COPD (chronic obstructive pulmonary disease) ([facility] Depression, Diabetes mellitus ([facility] Diabetic polyneuropathy associated with type 2 diabetes mellitus ([facility] Dysthymia, ESBL (extended spectrum beta-lactamase) producing bacteria infection, Fibromyalgia, Gastric ulcer, unspecified as acute or chronic, without mention of hemorrhage, perforation, or obstruction, GERD (gastroesophageal reflux disease), Gout, HIGH CHOLESTEROL, Hypertension, Hypothyroidism, Severe persistent asthma without complication, Thyroid disease, and TIA (transient ischemic attack). Patient presents with Pneumonia. HPI of presenting complaint in blockformat: (1-3 for expanded problem focused, ?4 for detailed/comprehensive)   Location: chest congestion/cough  Duration: 3d  Timing:    Context: 45 y.o. female presents to the emergency department with repeat visit from yesterday. The patient was seen here yesterday with possible coronavirus, she was discharged home and told to self isolate. She was discharged with doxycycline after being found to have bibasilar pneumonia on chest film. She does have history of supplemental oxygen use with COPD.   Patient states that her breathing today is worse and she is unable to ambulate due to extreme [address] [place] [address]   Phone [phone]   LACTATE, SEPSIS   EMERGENT DISEASE PANEL         IMAGING:  Imaging results from the ER have been reviewed in the computerized chart. Xr Chest Standard (2 Vw)    Result Date: 3/16/2020  EXAMINATION: TWO XRAY VIEWS OF THE CHEST 3/16/2020 4:10 pm COMPARISON: 09/11/2018 HISTORY: ORDERING SYSTEM PROVIDED HISTORY: Acute on chronic diastolic congestive heart failure ([address] TECHNOLOGIST PROVIDED HISTORY: Reason for Exam: chronic diastolic congestive heart failure Acuity: Acute Type of Exam: Initial FINDINGS: There is patchy right basilar airspace disease. The left lung is clear. Heart size and vascularity are stable. Right-sided Port-A-Cath is unchanged in position. The pleural spaces are clear. Mild right basilar segmental atelectasis versus pneumonia. Xr Chest Portable    Result Date: 3/25/2020  EXAMINATION: ONE XRAY VIEW OF THE CHEST 3/25/2020 3:13 pm COMPARISON: 03/25/2020 HISTORY: ORDERING SYSTEM PROVIDED HISTORY: SOB TECHNOLOGIST PROVIDED HISTORY: Reason for exam:->SOB Reason for Exam: Cough Acuity: Acute Type of Exam: Initial FINDINGS: The lung volumes are low. No change in the bibasilar airspace disease either due to atelectasis or developing pneumonia. The cardiac silhouette is stable. There is no pneumothorax or pleural effusion. The right port terminates in the distal SVC. 1. No significant change. Xr Chest Portable    Result Date: 3/25/2020  EXAMINATION: ONE XRAY VIEW OF THE CHEST 3/25/2020 12:19 am COMPARISON: 03/16/2020 HISTORY: ORDERING SYSTEM PROVIDED HISTORY: sob TECHNOLOGIST PROVIDED HISTORY: Reason for exam:->sob Reason for Exam: Shortness of Breath (Patient with SOB, dizziness, and headache today. Has needed increased O2 for the past week, normally on 2L, now on 3L.   states pt has also been experiencing diarrhea and dry cough. ) Acuity: Acute Type of Exam: Initial FINDINGS: Extensive artifact projects over the patient, presumably on the patient's clothing. Lung volumes are low. There are bibasilar airspace opacities. The heart size is at the upper limits of normal.  There is no large pleural effusion or definite evidence for pneumothorax. Right chest port is again seen. Bibasilar atelectasis or pneumonia. EKG: from ER, interpreted by self, NSR at 80. No acute st elevation. Comparison made to ekg in old chart dated 10/31/19, appears similar          MEDICAL DECISION MAKING:    Principal Problem:    Pneumonia -New Problem to me. Pt with bibasilar PNA. Was seen yest in ER, thought to have COVID. Plan: iv abx started . [facility] HHN ordered. O2 as needed. Will check serial CXR. WBC ordered for AM.  Respiratory therapy asked to see. Monitor for signs of antibiotic toxicity with serial exam and lab studies. COVID test sent in ER. Place in droplet plus isolation  Active Problems:    Hyperlipidemia LDL goal <70 -Established problem. Stable. Plan: on statin, to continue    Essential hypertension -Established problem. Stable. 109/71  Plan: Pt home BP meds reviewed and will be continued. Will monitor labs to assess Creat/K for possible complications of medications. Poorly controlled type 2 diabetes mellitus ([address] -Established problem. Stable. Plan: Patient placed on controlled carbohydrate diet. Fingerstick sugars to be checked to monitor for both hypoglycemia as well as hyperglycemia. Sliding scale insulin ordered. Glucagon and dextrose ordered for hypoglycemia. Patient will be continued on home medications. Hemoglobin a1c to be ordered to assess efficacy of therapy. Hiatal hernia with GERD  Plan: stay on PPI    Acute respiratory failure with hypoxemia ([address] -New Problem to me. Due to PNA. Pt on o2  Plan: continue o2. tx as above. If required, will consult her pulm group          Diagnoses as listed above, designated as new or established and plan outlined for each.      Data Reviewed:   (1) Lab tests

## 2020-03-25 NOTE — CARE COORDINATION
Ambulatory Care Coordination  ED Follow up Call    Reason for ED visit:  Pneumonia   Status:     significantly worsened    Did you call your PCP prior to going to the ED? Not Applicable      Did you receive a discharge instructions from the Emergency Room? Yes  Review of Instructions:     Understands what to report/when to return?:  Yes   Understands discharge instructions?:  Yes   Following discharge instructions?:  Yes      Are there any other complaints/concerns that you wish to tell your provider? The pt's  called the nurse back and stated he gave the pt the antibiotic, a breathing treatment and increased her O2 to 5 L/min. The  stated the pt is a little better. The RN, ACM asked if the pt's lungs sounded crackly and with wheezing and the  stated they did. The  stated he was concerned that the hospital did not keep the pt. The  stated they would not let him go in with the pt and she does not understand English that well. The  stated he asked the ED to get the pt a  but he does not believe they did. The  stated he had to sit in his car for several hours before he heard anything. The RN, ACM strongly encouraged the  to take the pt back to the ED. The RN, ACM discussed the risk of COVID-19 with the . The  stated he would call 911. The RN, ACM told the  to tell the healthcare person at the hospital that he is the pt's caregiver and he knows her history and if he cannot go in she must have a .     FU appts/Provider:  Instructed  to take the pt back to the ED    Future Appointments   Date Time Provider Leda Richter   4/29/2020  2:40 PM DO SELWYN Joe IM [facility]   6/3/2020  3:20 PM Brian Castillo MD [facility] ENDO [facility]   6/22/2020  1:30 PM Andrew Ornelas MD [facility] Cardio [facility]   9/22/2020  2:30 PM Flaco Proctor MD PULM & CC [facility]     New Medication:    doxycycline hyclate (VIBRA-TABS) 100 MG tablet 20 tablet 0 3/25/2020 2020    Sig - Route: Take 1 tablet by mouth 2 times daily for 10 days - Oral      COVID-19 Screening Initial Follow-up Note    Patient contacted regarding Addison Flores. Care Transition Nurse/ Ambulatory Care Manager contacted the family,  by telephone to perform post discharge assessment. Verified name and  with family as identifiers. Provided introduction to self, and explanation of the CTN/ACM role, and reason for call due to risk factors for infection and/or exposure to COVID-19. Symptoms reviewed with family who verbalized the following symptoms: fever, fatigue, pain or aching joints, cough, shortness of breath, sweating and fast breathing       Due to worsening symptoms encounter was routed to provider for escalation. Patient has following risk factors of: DM,CAD, LUIS, heart failure, asthma and pneumonia. CTN/ACM reviewed discharge instructions, medical action plan and red flags such as increased shortness of breath, increasing fever and signs of decompensation with family who verbalized understanding. Discussed exposure protocols and quarantine with CDC Guidelines What to do if you are sick with coronavirus disease 2019 Family who was given an opportunity for questions and concerns. The family agrees to contact the [facility] exposure line [phone], local [facility] department [address]: ([phone]) and PCP office for questions related to their healthcare. CTN/ACM provided contact information for future reference.     Reviewed and educated family on any new and changed medications related to discharge diagnosis     Plan for follow-up call in 1-2 days based on severity of symptoms and risk factors

## 2020-03-25 NOTE — TELEPHONE ENCOUNTER
Pt's  states that pt was rushed to hospital and in order to do ct scan w/o contrast, dr has send in order or call for it to be done  Breathing has gotten worse and oxygen was raised from 3 liters to 6 lliters  Pt was home now back in hospital  Please advise

## 2020-03-25 NOTE — FLOWSHEET NOTE
Pt. To room 3323 from ED via W/C. Pt. VSS, patient denies pain, patient oriented to room. Pt. Updated on POC, denies questions. Pt. Given toiletries, denies further needs. Bed in lowest position, bed alarm activated, call light and bedside table within reach. Will continue to monitor. Latricia Pacheco        03/25/20 1850   Assessment   Charting Type Admission   Neurological   Neuro (WDL) WDL   Level of Consciousness 0   Orientation Level Oriented X4   RUE Motor Response Responds to command;Normal extension;Normal flexion; Movement to painful stimulus; No tremor   LUE Motor Response Responds to command;Normal extension;Normal flexion; Movement to painful stimulus; No tremor   RLE Motor Response Responds to command;Normal extension;Normal flexion; Movement to painful stimulus; No tremor   LLE Motor Response Responds to command;Normal extension;Normal flexion;Tremors; Movement to painful stimulus; No tremor   [place] Coma Scale   Eye Opening 4   Best Verbal Response 5   Best Motor Response 6   [place] Coma Scale Score 15   HEENT   HEENT (WDL) X   Right Eye Intact; Impaired vision  (wears glasses)   Left Eye Intact; Impaired vision  (wears glasses)   Respiratory   Respiratory (WDL) X   Respiratory Pattern Regular   Respiratory Depth Normal   Respiratory Quality/Effort Unlabored;Dyspnea with exertion   Chest Assessment Chest expansion symmetrical;Trachea midline   L Breath Sounds Diminished   R Breath Sounds Diminished   Breath Sounds   Right Upper Lobe Diminished   Right Middle Lobe Diminished   Right Lower Lobe Diminished   Left Upper Lobe Diminished   Left Lower Lobe Diminished   Cardiac   Cardiac (WDL) WDL   Cardiac Regularity Regular   Heart Sounds S1, S2   Cardiac Rhythm NSR   Rhythm Interpretation   Pulse 79   Gastrointestinal   Abdominal (WDL) WDL   Abdomen Inspection Soft;Non-distended   Last BM (including prior to admit) 03/24/20   Tenderness Soft; No guarding;Nontender; No rebound   RUQ Bowel Sounds Active   LUQ Bowel Sounds Active   RLQ Bowel Sounds Active   LLQ Bowel Sounds Active   Peripheral Vascular   Peripheral Vascular (WDL) WDL   Edema Left upper extremity;Right lower extremity; Left lower extremity   LUE Edema +3;Pitting   RLE Edema +2;Pitting   LLE Edema +2;Pitting   Sensation RUE Full sensation; No numbness; No pain; No tingling   Sensation LUE Full sensation; No numbness; No pain; No tingling   Sensation RLE Full sensation; No numbness; No pain; No tingling   Sensation LLE Full sensation; No numbness; No pain; No tingling   RUE Neurovascular Assessment   Capillary Refill Less than/equal to 3 seconds   Color Appropriate for ethnicity   Temperature Warm   R Radial Pulse +1   LUE Neurovascular Assessment   Capillary Refill Less than/equal to 3 seconds   Color Appropriate for ethnicity;Pale   Temperature Cool   L Radial Pulse DANNIELLE   RLE Neurovascular Assessment   Capillary Refill Less than/equal to 3 seconds   Color Appropriate for ethnicity   Temperature Warm   R Pedal Pulse +2   LLE Neurovascular Assessment   Capillary Refill Less than/equal to 3 seconds   Color Appropriate for ethnicity   Temperature Warm   L Pedal Pulse +2   Skin Color/Condition   Skin Color/Condition (WDL) WDL   Skin Color Appropriate for ethnicity;Pale   Skin Condition/Temp Warm;Dry;Swollen   Skin Integrity   Skin Integrity (WDL) WDL   Musculoskeletal   Musculoskeletal (WDL) X   RUE Full movement   LUE Full movement;Weakness; Swelling   RL Extremity Full movement;Swelling;Weakness; Unsteady   LL Extremity Full movement;Swelling;Weakness; Unsteady   Genitourinary   Genitourinary (WDL) WDL   Flank Tenderness No   Suprapubic Tenderness No   Dysuria No   Anus/Rectum   Anus/Rectum (WDL) WDL   Psychosocial   Psychosocial (WDL) WDL

## 2020-03-26 ENCOUNTER — APPOINTMENT (OUTPATIENT)
Dept: GENERAL RADIOLOGY | Age: 50
DRG: 139 | End: 2020-03-26
Payer: COMMERCIAL

## 2020-03-26 LAB
A/G RATIO: 1 (ref 1.1–2.2)
ALBUMIN SERPL-MCNC: 3.4 G/DL (ref 3.4–5)
ALP BLD-CCNC: 85 U/L (ref 40–129)
ALT SERPL-CCNC: 15 U/L (ref 10–40)
ANION GAP SERPL CALCULATED.3IONS-SCNC: 10 MMOL/L (ref 3–16)
AST SERPL-CCNC: 13 U/L (ref 15–37)
BASOPHILS ABSOLUTE: 0 K/UL (ref 0–0.2)
BASOPHILS RELATIVE PERCENT: 0.5 %
BILIRUB SERPL-MCNC: <0.2 MG/DL (ref 0–1)
BUN BLDV-MCNC: 26 MG/DL (ref 7–20)
CALCIUM SERPL-MCNC: 9.3 MG/DL (ref 8.3–10.6)
CHLORIDE BLD-SCNC: 101 MMOL/L (ref 99–110)
CO2: 32 MMOL/L (ref 21–32)
CREAT SERPL-MCNC: 1 MG/DL (ref 0.6–1.1)
EOSINOPHILS ABSOLUTE: 0.1 K/UL (ref 0–0.6)
EOSINOPHILS RELATIVE PERCENT: 1.9 %
ESTIMATED AVERAGE GLUCOSE: 177.2 MG/DL
GFR AFRICAN AMERICAN: >60
GFR NON-AFRICAN AMERICAN: 58
GLOBULIN: 3.3 G/DL
GLUCOSE BLD-MCNC: 132 MG/DL (ref 70–99)
GLUCOSE BLD-MCNC: 132 MG/DL (ref 70–99)
GLUCOSE BLD-MCNC: 237 MG/DL (ref 70–99)
GLUCOSE BLD-MCNC: 77 MG/DL (ref 70–99)
HBA1C MFR BLD: 7.8 %
HCT VFR BLD CALC: 32.1 % (ref 36–48)
HEMOGLOBIN: 10.4 G/DL (ref 12–16)
LYMPHOCYTES ABSOLUTE: 2.5 K/UL (ref 1–5.1)
LYMPHOCYTES RELATIVE PERCENT: 34.4 %
MCH RBC QN AUTO: 29.3 PG (ref 26–34)
MCHC RBC AUTO-ENTMCNC: 32.4 G/DL (ref 31–36)
MCV RBC AUTO: 90.6 FL (ref 80–100)
MONOCYTES ABSOLUTE: 0.5 K/UL (ref 0–1.3)
MONOCYTES RELATIVE PERCENT: 7.6 %
NEUTROPHILS ABSOLUTE: 4 K/UL (ref 1.7–7.7)
NEUTROPHILS RELATIVE PERCENT: 55.6 %
PDW BLD-RTO: 13.8 % (ref 12.4–15.4)
PERFORMED ON: ABNORMAL
PERFORMED ON: ABNORMAL
PERFORMED ON: NORMAL
PLATELET # BLD: 211 K/UL (ref 135–450)
PMV BLD AUTO: 10.3 FL (ref 5–10.5)
POTASSIUM REFLEX MAGNESIUM: 4.3 MMOL/L (ref 3.5–5.1)
PROCALCITONIN: 0.06 NG/ML (ref 0–0.15)
RBC # BLD: 3.54 M/UL (ref 4–5.2)
SODIUM BLD-SCNC: 143 MMOL/L (ref 136–145)
TOTAL PROTEIN: 6.7 G/DL (ref 6.4–8.2)
WBC # BLD: 7.2 K/UL (ref 4–11)

## 2020-03-26 PROCEDURE — 6360000002 HC RX W HCPCS: Performed by: INTERNAL MEDICINE

## 2020-03-26 PROCEDURE — 6370000000 HC RX 637 (ALT 250 FOR IP): Performed by: INTERNAL MEDICINE

## 2020-03-26 PROCEDURE — 83036 HEMOGLOBIN GLYCOSYLATED A1C: CPT

## 2020-03-26 PROCEDURE — 2700000000 HC OXYGEN THERAPY PER DAY

## 2020-03-26 PROCEDURE — 84145 PROCALCITONIN (PCT): CPT

## 2020-03-26 PROCEDURE — 80053 COMPREHEN METABOLIC PANEL: CPT

## 2020-03-26 PROCEDURE — 94640 AIRWAY INHALATION TREATMENT: CPT

## 2020-03-26 PROCEDURE — 85025 COMPLETE CBC W/AUTO DIFF WBC: CPT

## 2020-03-26 PROCEDURE — 94761 N-INVAS EAR/PLS OXIMETRY MLT: CPT

## 2020-03-26 PROCEDURE — 71045 X-RAY EXAM CHEST 1 VIEW: CPT

## 2020-03-26 PROCEDURE — 2580000003 HC RX 258: Performed by: INTERNAL MEDICINE

## 2020-03-26 PROCEDURE — 1200000000 HC SEMI PRIVATE

## 2020-03-26 PROCEDURE — 36415 COLL VENOUS BLD VENIPUNCTURE: CPT

## 2020-03-26 RX ADMIN — Medication 2 PUFF: at 12:08

## 2020-03-26 RX ADMIN — OXYCODONE HYDROCHLORIDE 20 MG: 20 TABLET, FILM COATED, EXTENDED RELEASE ORAL at 21:01

## 2020-03-26 RX ADMIN — PANTOPRAZOLE SODIUM 40 MG: 40 TABLET, DELAYED RELEASE ORAL at 05:39

## 2020-03-26 RX ADMIN — FERROUS SULFATE TAB 325 MG (65 MG ELEMENTAL FE) 325 MG: 325 (65 FE) TAB at 17:33

## 2020-03-26 RX ADMIN — COLCHICINE 0.6 MG: 0.6 TABLET, FILM COATED ORAL at 21:01

## 2020-03-26 RX ADMIN — PREGABALIN 50 MG: 25 CAPSULE ORAL at 21:01

## 2020-03-26 RX ADMIN — INSULIN LISPRO 30 UNITS: 100 INJECTION, SOLUTION INTRAVENOUS; SUBCUTANEOUS at 12:00

## 2020-03-26 RX ADMIN — RANOLAZINE 500 MG: 500 TABLET, FILM COATED, EXTENDED RELEASE ORAL at 09:23

## 2020-03-26 RX ADMIN — DULOXETINE HYDROCHLORIDE 60 MG: 60 CAPSULE, DELAYED RELEASE ORAL at 09:23

## 2020-03-26 RX ADMIN — BENZTROPINE MESYLATE 1 MG: 1 TABLET ORAL at 21:00

## 2020-03-26 RX ADMIN — INSULIN LISPRO 2 UNITS: 100 INJECTION, SOLUTION INTRAVENOUS; SUBCUTANEOUS at 21:12

## 2020-03-26 RX ADMIN — DULOXETINE HYDROCHLORIDE 60 MG: 60 CAPSULE, DELAYED RELEASE ORAL at 21:01

## 2020-03-26 RX ADMIN — PANTOPRAZOLE SODIUM 40 MG: 40 TABLET, DELAYED RELEASE ORAL at 17:33

## 2020-03-26 RX ADMIN — Medication 2 PUFF: at 08:08

## 2020-03-26 RX ADMIN — ENOXAPARIN SODIUM 40 MG: 40 INJECTION SUBCUTANEOUS at 09:24

## 2020-03-26 RX ADMIN — RANOLAZINE 500 MG: 500 TABLET, FILM COATED, EXTENDED RELEASE ORAL at 21:01

## 2020-03-26 RX ADMIN — FERROUS SULFATE TAB 325 MG (65 MG ELEMENTAL FE) 325 MG: 325 (65 FE) TAB at 09:24

## 2020-03-26 RX ADMIN — COLCHICINE 0.6 MG: 0.6 TABLET, FILM COATED ORAL at 09:23

## 2020-03-26 RX ADMIN — CALCIUM CARBONATE-VITAMIN D TAB 500 MG-200 UNIT 1 TABLET: 500-200 TAB at 09:23

## 2020-03-26 RX ADMIN — OXYCODONE HYDROCHLORIDE AND ACETAMINOPHEN 1 TABLET: 10; 325 TABLET ORAL at 05:38

## 2020-03-26 RX ADMIN — METOPROLOL TARTRATE 25 MG: 25 TABLET, FILM COATED ORAL at 09:23

## 2020-03-26 RX ADMIN — LURASIDONE HYDROCHLORIDE 60 MG: 40 TABLET, FILM COATED ORAL at 17:34

## 2020-03-26 RX ADMIN — SULFASALAZINE 1000 MG: 500 TABLET ORAL at 20:59

## 2020-03-26 RX ADMIN — ASPIRIN 81 MG: 81 TABLET, COATED ORAL at 09:23

## 2020-03-26 RX ADMIN — SPIRONOLACTONE 50 MG: 25 TABLET ORAL at 09:23

## 2020-03-26 RX ADMIN — AZITHROMYCIN MONOHYDRATE 500 MG: 500 INJECTION, POWDER, LYOPHILIZED, FOR SOLUTION INTRAVENOUS at 20:50

## 2020-03-26 RX ADMIN — ALPRAZOLAM 1 MG: 0.5 TABLET ORAL at 21:00

## 2020-03-26 RX ADMIN — Medication 10 ML: at 09:37

## 2020-03-26 RX ADMIN — Medication 10 ML: at 20:51

## 2020-03-26 RX ADMIN — CETIRIZINE HYDROCHLORIDE 10 MG: 10 TABLET, FILM COATED ORAL at 09:23

## 2020-03-26 RX ADMIN — CALCIUM CARBONATE-VITAMIN D TAB 500 MG-200 UNIT 1 TABLET: 500-200 TAB at 17:33

## 2020-03-26 RX ADMIN — LEVOTHYROXINE SODIUM 150 MCG: 150 TABLET ORAL at 05:39

## 2020-03-26 RX ADMIN — INSULIN GLARGINE 150 UNITS: 100 INJECTION, SOLUTION SUBCUTANEOUS at 10:06

## 2020-03-26 RX ADMIN — METOLAZONE 2.5 MG: 2.5 TABLET ORAL at 09:23

## 2020-03-26 RX ADMIN — Medication 1 G: at 20:52

## 2020-03-26 RX ADMIN — GLIPIZIDE 10 MG: 5 TABLET ORAL at 17:33

## 2020-03-26 RX ADMIN — GLIPIZIDE 10 MG: 5 TABLET ORAL at 09:24

## 2020-03-26 RX ADMIN — PREGABALIN 50 MG: 25 CAPSULE ORAL at 09:35

## 2020-03-26 RX ADMIN — Medication 2 PUFF: at 15:28

## 2020-03-26 RX ADMIN — ATORVASTATIN CALCIUM 10 MG: 10 TABLET, FILM COATED ORAL at 20:59

## 2020-03-26 RX ADMIN — LUBIPROSTONE 24 MCG: 24 CAPSULE, GELATIN COATED ORAL at 09:36

## 2020-03-26 RX ADMIN — TORSEMIDE 100 MG: 100 TABLET ORAL at 09:23

## 2020-03-26 RX ADMIN — SENNOSIDES AND DOCUSATE SODIUM 2 TABLET: 8.6; 5 TABLET ORAL at 21:00

## 2020-03-26 RX ADMIN — OXYCODONE HYDROCHLORIDE 20 MG: 20 TABLET, FILM COATED, EXTENDED RELEASE ORAL at 09:36

## 2020-03-26 RX ADMIN — Medication 2 PUFF: at 20:00

## 2020-03-26 RX ADMIN — SULFASALAZINE 1000 MG: 500 TABLET ORAL at 09:23

## 2020-03-26 RX ADMIN — MONTELUKAST SODIUM 10 MG: 10 TABLET, FILM COATED ORAL at 21:00

## 2020-03-26 RX ADMIN — FOLIC ACID 1 MG: 1 TABLET ORAL at 09:23

## 2020-03-26 RX ADMIN — SENNOSIDES AND DOCUSATE SODIUM 2 TABLET: 8.6; 5 TABLET ORAL at 09:23

## 2020-03-26 RX ADMIN — LEFLUNOMIDE 10 MG: 20 TABLET ORAL at 09:37

## 2020-03-26 ASSESSMENT — PAIN DESCRIPTION - DESCRIPTORS
DESCRIPTORS: HEADACHE
DESCRIPTORS: HEADACHE

## 2020-03-26 ASSESSMENT — PAIN SCALES - GENERAL
PAINLEVEL_OUTOF10: 0
PAINLEVEL_OUTOF10: 7
PAINLEVEL_OUTOF10: 8
PAINLEVEL_OUTOF10: 0
PAINLEVEL_OUTOF10: 0
PAINLEVEL_OUTOF10: 6

## 2020-03-26 ASSESSMENT — PAIN DESCRIPTION - LOCATION
LOCATION: HEAD
LOCATION: HEAD

## 2020-03-26 ASSESSMENT — PAIN DESCRIPTION - PAIN TYPE: TYPE: ACUTE PAIN

## 2020-03-26 NOTE — PROGRESS NOTES
drugs..        Active Hospital Problems    Diagnosis Date Noted    Pneumonia [J18.9] 03/25/2020    Acute respiratory failure with hypoxemia (HCC) [J96.01] 03/25/2020    Hiatal hernia with GERD [K21.9, K44.9]     Essential hypertension [I10] 01/12/2018    Poorly controlled type 2 diabetes mellitus ([facility] [address] [E11.65] 01/12/2018    Hyperlipidemia LDL goal <70 [E78.5] 11/08/2017       Current Facility-Administered Medications: DULoxetine (CYMBALTA) extended release capsule 60 mg, 60 mg, Oral, BID  ALPRAZolam (XANAX) tablet 1 mg, 1 mg, Oral, Nightly  oxyCODONE-acetaminophen (PERCOCET)  MG per tablet 1 tablet, 1 tablet, Oral, Q4H PRN  aspirin EC tablet 81 mg, 81 mg, Oral, Daily  sulfaSALAzine (AZULFIDINE) tablet 1,000 mg, 1,000 mg, Oral, BID  calcium-vitamin D (OSCAL-500) 500-200 MG-UNIT per tablet 1 tablet, 1 tablet, Oral, BID  benztropine (COGENTIN) tablet 1 mg, 1 mg, Oral, Nightly  folic acid (FOLVITE) tablet 1 mg, 1 mg, Oral, Daily  lurasidone (LATUDA) tablet 60 mg, 60 mg, Oral, Dinner  oxyCODONE (OXYCONTIN) extended release tablet 20 mg, 20 mg, Oral, Q12H  albuterol sulfate  (90 Base) MCG/ACT inhaler 2 puff, 2 puff, Inhalation, Q6H PRN  colchicine (COLCRYS) tablet 0.6 mg, 0.6 mg, Oral, BID  metOLazone (ZAROXOLYN) tablet 2.5 mg, 2.5 mg, Oral, Daily  levothyroxine (SYNTHROID) tablet 150 mcg, 150 mcg, Oral, QAM AC  metoprolol tartrate (LOPRESSOR) tablet 25 mg, 25 mg, Oral, BID  atorvastatin (LIPITOR) tablet 10 mg, 10 mg, Oral, Nightly  leflunomide (ARAVA) tablet 10 mg, 10 mg, Oral, Daily  pantoprazole (PROTONIX) tablet 40 mg, 40 mg, Oral, BID AC  torsemide (DEMADEX) tablet 100 mg, 100 mg, Oral, Daily  ranolazine (RANEXA) extended release tablet 500 mg, 500 mg, Oral, BID  tiZANidine (ZANAFLEX) tablet 2 mg, 2 mg, Oral, Q8H PRN  nitroGLYCERIN (NITROSTAT) SL tablet 0.4 mg, 0.4 mg, Sublingual, Q5 Min PRN  ferrous sulfate (IRON 325) tablet 325 mg, 325 mg, Oral, BID WC  cetirizine (ZYRTEC) tablet 10 mg, 10 mg, Oral, Daily  sennosides-docusate sodium (SENOKOT-S) 8.6-50 MG tablet 2 tablet, 2 tablet, Oral, BID  montelukast (SINGULAIR) tablet 10 mg, 10 mg, Oral, Nightly  spironolactone (ALDACTONE) tablet 50 mg, 50 mg, Oral, BID  insulin glargine (LANTUS;BASAGLAR) injection pen 150 Units, 150 Units, Subcutaneous, Daily  glipiZIDE (GLUCOTROL) tablet 10 mg, 10 mg, Oral, BID WC  pregabalin (LYRICA) capsule 50 mg, 50 mg, Oral, BID  lubiprostone (AMITIZA) capsule 24 mcg, 24 mcg, Oral, Daily with breakfast  insulin lispro (1 Unit Dial) 30 Units, 30 Units, Subcutaneous, TID AC  0.9 % sodium chloride infusion, , Intravenous, Continuous  sodium chloride flush 0.9 % injection 10 mL, 10 mL, Intravenous, 2 times per day  sodium chloride flush 0.9 % injection 10 mL, 10 mL, Intravenous, PRN  acetaminophen (TYLENOL) tablet 650 mg, 650 mg, Oral, Q6H PRN **OR** acetaminophen (TYLENOL) suppository 650 mg, 650 mg, Rectal, Q6H PRN  magnesium hydroxide (MILK OF MAGNESIA) 400 MG/5ML suspension 30 mL, 30 mL, Oral, Daily PRN  promethazine (PHENERGAN) tablet 12.5 mg, 12.5 mg, Oral, Q6H PRN **OR** ondansetron (ZOFRAN) injection 4 mg, 4 mg, Intravenous, Q6H PRN  enoxaparin (LOVENOX) injection 40 mg, 40 mg, Subcutaneous, Daily  azithromycin (ZITHROMAX) 500 mg in D5W 250ml Vial Mate, 500 mg, Intravenous, Q24H **AND** cefTRIAXone (ROCEPHIN) 1 g in sterile water 10 mL IV syringe, 1 g, Intravenous, Q24H  insulin lispro (1 Unit Dial) 0-12 Units, 0-12 Units, Subcutaneous, TID WC  insulin lispro (1 Unit Dial) 0-6 Units, 0-6 Units, Subcutaneous, Nightly  glucose (GLUTOSE) 40 % oral gel 15 g, 15 g, Oral, PRN  dextrose 50 % IV solution, 12.5 g, Intravenous, PRN  glucagon (rDNA) injection 1 mg, 1 mg, Intramuscular, PRN  dextrose 5 % solution, 100 mL/hr, Intravenous, PRN  potassium chloride (KLOR-CON M) extended release tablet 40 mEq, 40 mEq, Oral, PRN **OR** potassium bicarb-citric acid (EFFER-K) effervescent tablet 40 mEq, 40 mEq, Oral, PRN **OR** potassium chloride 10 mEq/100 mL IVPB (Peripheral Line), 10 mEq, Intravenous, PRN  0.9 % sodium chloride bolus, 500 mL, Intravenous, PRN  hydrALAZINE (APRESOLINE) injection 10 mg, 10 mg, Intravenous, Q6H PRN  albuterol sulfate  (90 Base) MCG/ACT inhaler 2 puff, 2 puff, Inhalation, Q4H WA  ipratropium (ATROVENT HFA) 17 MCG/ACT inhaler 2 puff, 2 puff, Inhalation, Q4H WA         Objective:  /71   Pulse 88   Temp 99 °F (37.2 °C) (Oral)   Resp 16   Ht 5' 4\" (1.626 m)   Wt 186 lb 3.2 oz (84.5 kg)   SpO2 98%   BMI 31.96 kg/m²      Patient Vitals for the past 24 hrs:   BP Temp Temp src Pulse Resp SpO2 Height Weight   03/26/20 0920 116/71 99 °F (37.2 °C) Oral 88 16 98 % -- --   03/26/20 0808 -- -- -- -- 16 96 % -- --   03/26/20 0500 117/68 98.1 °F (36.7 °C) Oral 75 18 95 % -- 186 lb 3.2 oz (84.5 kg)   03/26/20 0000 (!) 90/90 98.5 °F (36.9 °C) Oral 72 16 95 % -- --   03/25/20 2028 134/72 97.7 °F (36.5 °C) Oral 95 16 94 % -- --   03/25/20 1850 118/69 97.8 °F (36.6 °C) Oral 79 16 95 % 5' 4\" (1.626 m) 190 lb (86.2 kg)   03/25/20 1700 109/71 -- -- -- -- 96 % -- --   03/25/20 1626 (!) 104/54 -- -- -- -- 98 % -- --   03/25/20 1514 -- -- -- -- -- 96 % -- --   03/25/20 1506 -- -- -- -- -- (!) 88 % -- --   03/25/20 1500 (!) 110/59 -- -- -- -- 90 % -- --   03/25/20 1430 114/60 97.8 °F (36.6 °C) Oral 87 13 93 % -- --     Patient Vitals for the past 96 hrs (Last 3 readings):   Weight   03/26/20 0500 186 lb 3.2 oz (84.5 kg)   03/25/20 1850 190 lb (86.2 kg)         No intake or output data in the 24 hours ending 03/26/20 1010      Physical Exam:   S1, S2 normal, no murmur, rub or gallop, regular rate and rhythm  Crackles bilat  abdomen is soft without significant tenderness, masses, organomegaly or guarding  extremities normal, atraumatic, no cyanosis or edema    Labs:  Lab Results   Component Value Date    WBC 7.2 03/26/2020    HGB 10.4 (L) 03/26/2020    HCT 32.1 (L) 03/26/2020     03/26/2020    CHOL 177 02/18/2019

## 2020-03-27 ENCOUNTER — APPOINTMENT (OUTPATIENT)
Dept: GENERAL RADIOLOGY | Age: 50
DRG: 139 | End: 2020-03-27
Payer: COMMERCIAL

## 2020-03-27 LAB
ANION GAP SERPL CALCULATED.3IONS-SCNC: 11 MMOL/L (ref 3–16)
BASOPHILS ABSOLUTE: 0 K/UL (ref 0–0.2)
BASOPHILS RELATIVE PERCENT: 0.9 %
BUN BLDV-MCNC: 23 MG/DL (ref 7–20)
CALCIUM SERPL-MCNC: 9.6 MG/DL (ref 8.3–10.6)
CHLORIDE BLD-SCNC: 98 MMOL/L (ref 99–110)
CO2: 34 MMOL/L (ref 21–32)
CREAT SERPL-MCNC: 1 MG/DL (ref 0.6–1.1)
EOSINOPHILS ABSOLUTE: 0.1 K/UL (ref 0–0.6)
EOSINOPHILS RELATIVE PERCENT: 2.4 %
GFR AFRICAN AMERICAN: >60
GFR NON-AFRICAN AMERICAN: 58
GLUCOSE BLD-MCNC: 107 MG/DL (ref 70–99)
GLUCOSE BLD-MCNC: 131 MG/DL (ref 70–99)
GLUCOSE BLD-MCNC: 244 MG/DL (ref 70–99)
GLUCOSE BLD-MCNC: 296 MG/DL (ref 70–99)
GLUCOSE BLD-MCNC: 62 MG/DL (ref 70–99)
HCT VFR BLD CALC: 33.7 % (ref 36–48)
HEMOGLOBIN: 10.9 G/DL (ref 12–16)
LYMPHOCYTES ABSOLUTE: 2.3 K/UL (ref 1–5.1)
LYMPHOCYTES RELATIVE PERCENT: 41 %
Lab: NORMAL
MCH RBC QN AUTO: 29.3 PG (ref 26–34)
MCHC RBC AUTO-ENTMCNC: 32.4 G/DL (ref 31–36)
MCV RBC AUTO: 90.5 FL (ref 80–100)
MONOCYTES ABSOLUTE: 0.5 K/UL (ref 0–1.3)
MONOCYTES RELATIVE PERCENT: 9.8 %
NEUTROPHILS ABSOLUTE: 2.5 K/UL (ref 1.7–7.7)
NEUTROPHILS RELATIVE PERCENT: 45.9 %
PDW BLD-RTO: 13.7 % (ref 12.4–15.4)
PERFORMED ON: ABNORMAL
PLATELET # BLD: 220 K/UL (ref 135–450)
PMV BLD AUTO: 9.7 FL (ref 5–10.5)
POTASSIUM SERPL-SCNC: 4.1 MMOL/L (ref 3.5–5.1)
RBC # BLD: 3.72 M/UL (ref 4–5.2)
REPORT: NORMAL
SARS-COV-2: NOT DETECTED
SODIUM BLD-SCNC: 143 MMOL/L (ref 136–145)
THIS TEST SENT TO: NORMAL
WBC # BLD: 5.5 K/UL (ref 4–11)

## 2020-03-27 PROCEDURE — 94640 AIRWAY INHALATION TREATMENT: CPT

## 2020-03-27 PROCEDURE — 2700000000 HC OXYGEN THERAPY PER DAY

## 2020-03-27 PROCEDURE — 94761 N-INVAS EAR/PLS OXIMETRY MLT: CPT

## 2020-03-27 PROCEDURE — 71045 X-RAY EXAM CHEST 1 VIEW: CPT

## 2020-03-27 PROCEDURE — 36415 COLL VENOUS BLD VENIPUNCTURE: CPT

## 2020-03-27 PROCEDURE — 6370000000 HC RX 637 (ALT 250 FOR IP): Performed by: INTERNAL MEDICINE

## 2020-03-27 PROCEDURE — 2580000003 HC RX 258: Performed by: INTERNAL MEDICINE

## 2020-03-27 PROCEDURE — 6360000002 HC RX W HCPCS: Performed by: INTERNAL MEDICINE

## 2020-03-27 PROCEDURE — 80048 BASIC METABOLIC PNL TOTAL CA: CPT

## 2020-03-27 PROCEDURE — 1200000000 HC SEMI PRIVATE

## 2020-03-27 PROCEDURE — 85025 COMPLETE CBC W/AUTO DIFF WBC: CPT

## 2020-03-27 RX ADMIN — DULOXETINE HYDROCHLORIDE 60 MG: 60 CAPSULE, DELAYED RELEASE ORAL at 09:38

## 2020-03-27 RX ADMIN — ASPIRIN 81 MG: 81 TABLET, COATED ORAL at 09:34

## 2020-03-27 RX ADMIN — OXYCODONE HYDROCHLORIDE 20 MG: 20 TABLET, FILM COATED, EXTENDED RELEASE ORAL at 20:42

## 2020-03-27 RX ADMIN — FERROUS SULFATE TAB 325 MG (65 MG ELEMENTAL FE) 325 MG: 325 (65 FE) TAB at 09:39

## 2020-03-27 RX ADMIN — ALPRAZOLAM 1 MG: 0.5 TABLET ORAL at 20:41

## 2020-03-27 RX ADMIN — METOLAZONE 2.5 MG: 2.5 TABLET ORAL at 09:35

## 2020-03-27 RX ADMIN — SPIRONOLACTONE 50 MG: 25 TABLET ORAL at 09:33

## 2020-03-27 RX ADMIN — SULFASALAZINE 1000 MG: 500 TABLET ORAL at 09:33

## 2020-03-27 RX ADMIN — Medication 2 PUFF: at 07:38

## 2020-03-27 RX ADMIN — ATORVASTATIN CALCIUM 10 MG: 10 TABLET, FILM COATED ORAL at 20:42

## 2020-03-27 RX ADMIN — METOPROLOL TARTRATE 25 MG: 25 TABLET, FILM COATED ORAL at 09:39

## 2020-03-27 RX ADMIN — COLCHICINE 0.6 MG: 0.6 TABLET, FILM COATED ORAL at 20:42

## 2020-03-27 RX ADMIN — MONTELUKAST SODIUM 10 MG: 10 TABLET, FILM COATED ORAL at 20:42

## 2020-03-27 RX ADMIN — LUBIPROSTONE 24 MCG: 24 CAPSULE, GELATIN COATED ORAL at 14:01

## 2020-03-27 RX ADMIN — INSULIN LISPRO 4 UNITS: 100 INJECTION, SOLUTION INTRAVENOUS; SUBCUTANEOUS at 18:13

## 2020-03-27 RX ADMIN — AZITHROMYCIN MONOHYDRATE 500 MG: 500 INJECTION, POWDER, LYOPHILIZED, FOR SOLUTION INTRAVENOUS at 20:41

## 2020-03-27 RX ADMIN — LEFLUNOMIDE 10 MG: 20 TABLET ORAL at 14:01

## 2020-03-27 RX ADMIN — INSULIN LISPRO 30 UNITS: 100 INJECTION, SOLUTION INTRAVENOUS; SUBCUTANEOUS at 14:00

## 2020-03-27 RX ADMIN — PANTOPRAZOLE SODIUM 40 MG: 40 TABLET, DELAYED RELEASE ORAL at 18:15

## 2020-03-27 RX ADMIN — BENZTROPINE MESYLATE 1 MG: 1 TABLET ORAL at 20:41

## 2020-03-27 RX ADMIN — INSULIN LISPRO 30 UNITS: 100 INJECTION, SOLUTION INTRAVENOUS; SUBCUTANEOUS at 09:15

## 2020-03-27 RX ADMIN — DULOXETINE HYDROCHLORIDE 60 MG: 60 CAPSULE, DELAYED RELEASE ORAL at 20:44

## 2020-03-27 RX ADMIN — ENOXAPARIN SODIUM 40 MG: 40 INJECTION SUBCUTANEOUS at 09:40

## 2020-03-27 RX ADMIN — Medication 2 PUFF: at 16:40

## 2020-03-27 RX ADMIN — CALCIUM CARBONATE-VITAMIN D TAB 500 MG-200 UNIT 1 TABLET: 500-200 TAB at 09:34

## 2020-03-27 RX ADMIN — ACETAMINOPHEN 650 MG: 325 TABLET, FILM COATED ORAL at 09:33

## 2020-03-27 RX ADMIN — RANOLAZINE 500 MG: 500 TABLET, FILM COATED, EXTENDED RELEASE ORAL at 20:42

## 2020-03-27 RX ADMIN — PREGABALIN 50 MG: 25 CAPSULE ORAL at 20:41

## 2020-03-27 RX ADMIN — GLIPIZIDE 10 MG: 5 TABLET ORAL at 18:14

## 2020-03-27 RX ADMIN — GLIPIZIDE 10 MG: 5 TABLET ORAL at 09:36

## 2020-03-27 RX ADMIN — Medication 2 PUFF: at 20:45

## 2020-03-27 RX ADMIN — PREGABALIN 50 MG: 25 CAPSULE ORAL at 09:36

## 2020-03-27 RX ADMIN — TORSEMIDE 100 MG: 100 TABLET ORAL at 09:35

## 2020-03-27 RX ADMIN — COLCHICINE 0.6 MG: 0.6 TABLET, FILM COATED ORAL at 09:34

## 2020-03-27 RX ADMIN — LURASIDONE HYDROCHLORIDE 60 MG: 40 TABLET, FILM COATED ORAL at 20:45

## 2020-03-27 RX ADMIN — OXYCODONE HYDROCHLORIDE 20 MG: 20 TABLET, FILM COATED, EXTENDED RELEASE ORAL at 09:39

## 2020-03-27 RX ADMIN — INSULIN LISPRO 6 UNITS: 100 INJECTION, SOLUTION INTRAVENOUS; SUBCUTANEOUS at 14:00

## 2020-03-27 RX ADMIN — SENNOSIDES AND DOCUSATE SODIUM 2 TABLET: 8.6; 5 TABLET ORAL at 09:34

## 2020-03-27 RX ADMIN — Medication 10 ML: at 20:43

## 2020-03-27 RX ADMIN — FOLIC ACID 1 MG: 1 TABLET ORAL at 09:37

## 2020-03-27 RX ADMIN — LEVOTHYROXINE SODIUM 150 MCG: 150 TABLET ORAL at 06:25

## 2020-03-27 RX ADMIN — CETIRIZINE HYDROCHLORIDE 10 MG: 10 TABLET, FILM COATED ORAL at 09:36

## 2020-03-27 RX ADMIN — Medication 10 ML: at 14:03

## 2020-03-27 RX ADMIN — FERROUS SULFATE TAB 325 MG (65 MG ELEMENTAL FE) 325 MG: 325 (65 FE) TAB at 18:14

## 2020-03-27 RX ADMIN — SPIRONOLACTONE 50 MG: 25 TABLET ORAL at 20:42

## 2020-03-27 RX ADMIN — SULFASALAZINE 1000 MG: 500 TABLET ORAL at 20:41

## 2020-03-27 RX ADMIN — Medication 1 G: at 20:44

## 2020-03-27 RX ADMIN — CALCIUM CARBONATE-VITAMIN D TAB 500 MG-200 UNIT 1 TABLET: 500-200 TAB at 18:14

## 2020-03-27 RX ADMIN — PANTOPRAZOLE SODIUM 40 MG: 40 TABLET, DELAYED RELEASE ORAL at 06:25

## 2020-03-27 RX ADMIN — Medication 2 PUFF: at 11:48

## 2020-03-27 RX ADMIN — RANOLAZINE 500 MG: 500 TABLET, FILM COATED, EXTENDED RELEASE ORAL at 09:36

## 2020-03-27 RX ADMIN — INSULIN GLARGINE 150 UNITS: 100 INJECTION, SOLUTION SUBCUTANEOUS at 20:55

## 2020-03-27 RX ADMIN — SENNOSIDES AND DOCUSATE SODIUM 2 TABLET: 8.6; 5 TABLET ORAL at 20:42

## 2020-03-27 RX ADMIN — INSULIN LISPRO 30 UNITS: 100 INJECTION, SOLUTION INTRAVENOUS; SUBCUTANEOUS at 18:12

## 2020-03-27 RX ADMIN — OXYCODONE HYDROCHLORIDE AND ACETAMINOPHEN 1 TABLET: 10; 325 TABLET ORAL at 14:01

## 2020-03-27 ASSESSMENT — PAIN DESCRIPTION - PAIN TYPE: TYPE: ACUTE PAIN

## 2020-03-27 ASSESSMENT — PAIN SCALES - GENERAL
PAINLEVEL_OUTOF10: 6
PAINLEVEL_OUTOF10: 9
PAINLEVEL_OUTOF10: 0
PAINLEVEL_OUTOF10: 6
PAINLEVEL_OUTOF10: 9
PAINLEVEL_OUTOF10: 6
PAINLEVEL_OUTOF10: 6

## 2020-03-27 ASSESSMENT — PAIN DESCRIPTION - LOCATION
LOCATION: HEAD

## 2020-03-27 ASSESSMENT — PAIN DESCRIPTION - DESCRIPTORS
DESCRIPTORS: HEADACHE
DESCRIPTORS: HEADACHE

## 2020-03-27 NOTE — PROGRESS NOTES
Resting in bed with eyes closed, breathing easy. Awakened to assess and pass meds. Meds taken whole with water. Offered assistance to bathroom, denied need, informed to call when needing assist. Denies needs, call light in reach, bed alarm engaged.

## 2020-03-27 NOTE — PROGRESS NOTES
Acute Type of Exam: Initial FINDINGS: The lung volumes are low. No change in the bibasilar airspace disease either due to atelectasis or developing pneumonia. The cardiac silhouette is stable. There is no pneumothorax or pleural effusion. The right port terminates in the distal SVC. 1. No significant change. Xr Chest Portable    Result Date: 3/25/2020  EXAMINATION: ONE XRAY VIEW OF THE CHEST 3/25/2020 12:19 am COMPARISON: 03/16/2020 HISTORY: ORDERING SYSTEM PROVIDED HISTORY: sob TECHNOLOGIST PROVIDED HISTORY: Reason for exam:->sob Reason for Exam: Shortness of Breath (Patient with SOB, dizziness, and headache today. Has needed increased O2 for the past week, normally on 2L, now on 3L.  states pt has also been experiencing diarrhea and dry cough. ) Acuity: Acute Type of Exam: Initial FINDINGS: Extensive artifact projects over the patient, presumably on the patient's clothing. Lung volumes are low. There are bibasilar airspace opacities. The heart size is at the upper limits of normal.  There is no large pleural effusion or definite evidence for pneumothorax. Right chest port is again seen. Bibasilar atelectasis or pneumonia. Assessment and Plan:  Principal Problem:    Pneumonia -Established problem. Maybe a little worse as o2 requirement is up to 3L  Plan: cont  Empiric abx. Await covid testing. Repeat WBC ordered. Active Problems:    Hyperlipidemia LDL goal <70  Plan: on statin, to continue    Essential hypertension -Established problem. Stable. 119/57  Plan: cont same meds    Poorly controlled type 2 diabetes mellitus ([address]  Plan: FSBS 107 - cont same meds    Hiatal hernia with GERD  Plan: Continue present orders/plan. Acute respiratory failure with hypoxemia ([address] -Established problem.  A little worse  Plan: cont 3L o2            Светлана Nuñez  3/27/2020

## 2020-03-28 VITALS
SYSTOLIC BLOOD PRESSURE: 115 MMHG | BODY MASS INDEX: 31.72 KG/M2 | HEIGHT: 64 IN | TEMPERATURE: 98.6 F | DIASTOLIC BLOOD PRESSURE: 73 MMHG | RESPIRATION RATE: 16 BRPM | HEART RATE: 84 BPM | WEIGHT: 185.8 LBS | OXYGEN SATURATION: 96 %

## 2020-03-28 LAB
ANION GAP SERPL CALCULATED.3IONS-SCNC: 12 MMOL/L (ref 3–16)
BASOPHILS ABSOLUTE: 0 K/UL (ref 0–0.2)
BASOPHILS RELATIVE PERCENT: 0.5 %
BUN BLDV-MCNC: 23 MG/DL (ref 7–20)
CALCIUM SERPL-MCNC: 9.8 MG/DL (ref 8.3–10.6)
CHLORIDE BLD-SCNC: 97 MMOL/L (ref 99–110)
CO2: 32 MMOL/L (ref 21–32)
CREAT SERPL-MCNC: 1.1 MG/DL (ref 0.6–1.1)
EOSINOPHILS ABSOLUTE: 0.2 K/UL (ref 0–0.6)
EOSINOPHILS RELATIVE PERCENT: 2.6 %
GFR AFRICAN AMERICAN: >60
GFR NON-AFRICAN AMERICAN: 52
GLUCOSE BLD-MCNC: 124 MG/DL (ref 70–99)
GLUCOSE BLD-MCNC: 140 MG/DL (ref 70–99)
GLUCOSE BLD-MCNC: 66 MG/DL (ref 70–99)
GLUCOSE BLD-MCNC: 80 MG/DL (ref 70–99)
HCT VFR BLD CALC: 34.7 % (ref 36–48)
HEMOGLOBIN: 11.1 G/DL (ref 12–16)
LYMPHOCYTES ABSOLUTE: 2.5 K/UL (ref 1–5.1)
LYMPHOCYTES RELATIVE PERCENT: 40.2 %
MCH RBC QN AUTO: 29.4 PG (ref 26–34)
MCHC RBC AUTO-ENTMCNC: 32.1 G/DL (ref 31–36)
MCV RBC AUTO: 91.4 FL (ref 80–100)
MONOCYTES ABSOLUTE: 0.7 K/UL (ref 0–1.3)
MONOCYTES RELATIVE PERCENT: 10.8 %
NEUTROPHILS ABSOLUTE: 2.9 K/UL (ref 1.7–7.7)
NEUTROPHILS RELATIVE PERCENT: 45.9 %
PDW BLD-RTO: 13.8 % (ref 12.4–15.4)
PERFORMED ON: ABNORMAL
PLATELET # BLD: 246 K/UL (ref 135–450)
PMV BLD AUTO: 9.9 FL (ref 5–10.5)
POTASSIUM SERPL-SCNC: 4 MMOL/L (ref 3.5–5.1)
RBC # BLD: 3.79 M/UL (ref 4–5.2)
SODIUM BLD-SCNC: 141 MMOL/L (ref 136–145)
WBC # BLD: 6.3 K/UL (ref 4–11)

## 2020-03-28 PROCEDURE — 94640 AIRWAY INHALATION TREATMENT: CPT

## 2020-03-28 PROCEDURE — 6360000002 HC RX W HCPCS: Performed by: INTERNAL MEDICINE

## 2020-03-28 PROCEDURE — 85025 COMPLETE CBC W/AUTO DIFF WBC: CPT

## 2020-03-28 PROCEDURE — 36415 COLL VENOUS BLD VENIPUNCTURE: CPT

## 2020-03-28 PROCEDURE — 6370000000 HC RX 637 (ALT 250 FOR IP): Performed by: INTERNAL MEDICINE

## 2020-03-28 PROCEDURE — 80048 BASIC METABOLIC PNL TOTAL CA: CPT

## 2020-03-28 PROCEDURE — 2700000000 HC OXYGEN THERAPY PER DAY

## 2020-03-28 PROCEDURE — 2580000003 HC RX 258: Performed by: INTERNAL MEDICINE

## 2020-03-28 RX ORDER — IPRATROPIUM BROMIDE AND ALBUTEROL SULFATE 2.5; .5 MG/3ML; MG/3ML
1 SOLUTION RESPIRATORY (INHALATION)
Status: DISCONTINUED | OUTPATIENT
Start: 2020-03-28 | End: 2020-03-28 | Stop reason: HOSPADM

## 2020-03-28 RX ORDER — IPRATROPIUM BROMIDE AND ALBUTEROL SULFATE 2.5; .5 MG/3ML; MG/3ML
SOLUTION RESPIRATORY (INHALATION)
Status: DISCONTINUED
Start: 2020-03-28 | End: 2020-03-28 | Stop reason: HOSPADM

## 2020-03-28 RX ORDER — AMOXICILLIN AND CLAVULANATE POTASSIUM 875; 125 MG/1; MG/1
1 TABLET, FILM COATED ORAL 2 TIMES DAILY
Qty: 14 TABLET | Refills: 0 | Status: SHIPPED | OUTPATIENT
Start: 2020-03-28 | End: 2020-04-04

## 2020-03-28 RX ADMIN — METOPROLOL TARTRATE 25 MG: 25 TABLET, FILM COATED ORAL at 00:57

## 2020-03-28 RX ADMIN — RANOLAZINE 500 MG: 500 TABLET, FILM COATED, EXTENDED RELEASE ORAL at 09:51

## 2020-03-28 RX ADMIN — DULOXETINE HYDROCHLORIDE 60 MG: 60 CAPSULE, DELAYED RELEASE ORAL at 09:51

## 2020-03-28 RX ADMIN — CETIRIZINE HYDROCHLORIDE 10 MG: 10 TABLET, FILM COATED ORAL at 09:35

## 2020-03-28 RX ADMIN — METOLAZONE 2.5 MG: 2.5 TABLET ORAL at 09:51

## 2020-03-28 RX ADMIN — PANTOPRAZOLE SODIUM 40 MG: 40 TABLET, DELAYED RELEASE ORAL at 05:58

## 2020-03-28 RX ADMIN — CALCIUM CARBONATE-VITAMIN D TAB 500 MG-200 UNIT 1 TABLET: 500-200 TAB at 09:50

## 2020-03-28 RX ADMIN — SPIRONOLACTONE 50 MG: 25 TABLET ORAL at 09:51

## 2020-03-28 RX ADMIN — SULFASALAZINE 1000 MG: 500 TABLET ORAL at 09:50

## 2020-03-28 RX ADMIN — ENOXAPARIN SODIUM 40 MG: 40 INJECTION SUBCUTANEOUS at 09:51

## 2020-03-28 RX ADMIN — FOLIC ACID 1 MG: 1 TABLET ORAL at 09:51

## 2020-03-28 RX ADMIN — TORSEMIDE 100 MG: 100 TABLET ORAL at 09:51

## 2020-03-28 RX ADMIN — OXYCODONE HYDROCHLORIDE 20 MG: 20 TABLET, FILM COATED, EXTENDED RELEASE ORAL at 09:50

## 2020-03-28 RX ADMIN — METOPROLOL TARTRATE 25 MG: 25 TABLET, FILM COATED ORAL at 09:51

## 2020-03-28 RX ADMIN — LEFLUNOMIDE 10 MG: 20 TABLET ORAL at 10:05

## 2020-03-28 RX ADMIN — INSULIN LISPRO 30 UNITS: 100 INJECTION, SOLUTION INTRAVENOUS; SUBCUTANEOUS at 10:02

## 2020-03-28 RX ADMIN — ASPIRIN 81 MG: 81 TABLET, COATED ORAL at 09:51

## 2020-03-28 RX ADMIN — COLCHICINE 0.6 MG: 0.6 TABLET, FILM COATED ORAL at 09:50

## 2020-03-28 RX ADMIN — LEVOTHYROXINE SODIUM 150 MCG: 150 TABLET ORAL at 05:59

## 2020-03-28 RX ADMIN — Medication 10 ML: at 09:52

## 2020-03-28 RX ADMIN — PREGABALIN 50 MG: 25 CAPSULE ORAL at 09:51

## 2020-03-28 RX ADMIN — GLIPIZIDE 10 MG: 5 TABLET ORAL at 10:04

## 2020-03-28 ASSESSMENT — PAIN SCALES - GENERAL
PAINLEVEL_OUTOF10: 0
PAINLEVEL_OUTOF10: 5

## 2020-03-28 NOTE — PLAN OF CARE
O2 2L, weaning when able.
Problem: Pain:  Goal: Pain level will decrease  Description: Pain level will decrease  Outcome: Ongoing     Problem: Falls - Risk of:  Goal: Will remain free from falls  Description: Will remain free from falls  Outcome: Ongoing     Problem: SAFETY  Goal: Free from accidental physical injury  Outcome: Ongoing     Problem: DAILY CARE  Goal: Daily care needs are met  Outcome: Ongoing     Problem: PAIN  Goal: Patient's pain/discomfort is manageable  Outcome: Ongoing     Problem: SKIN INTEGRITY  Goal: Skin integrity is maintained or improved  Outcome: Ongoing     Problem: KNOWLEDGE DEFICIT  Goal: Patient/S.O. demonstrates understanding of disease process, treatment plan, medications, and discharge instructions. Outcome: Ongoing     Problem: DISCHARGE BARRIERS  Goal: Patient's continuum of care needs are met  Outcome: Ongoing     Problem: Discharge Planning:  Goal: Discharged to appropriate level of care  Description: Discharged to appropriate level of care  Outcome: Ongoing     Problem: Serum Glucose Level - Abnormal:  Goal: Ability to maintain appropriate glucose levels will improve  Description: Ability to maintain appropriate glucose levels will improve  Outcome: Ongoing     Problem: Sensory Perception - Impaired:  Goal: Ability to maintain a stable neurologic state will improve  Description: Ability to maintain a stable neurologic state will improve  Outcome: Ongoing     Problem:  Activity:  Goal: Capacity to carry out activities will improve  Description: Capacity to carry out activities will improve  Outcome: Ongoing
status will improve  Description: Respiratory status will improve  Outcome: Ongoing     Problem:  Activity Intolerance:  Goal: Ability to tolerate increased activity will improve  Description: Ability to tolerate increased activity will improve  Outcome: Ongoing     Problem: Airway Clearance - Ineffective:  Goal: Ability to maintain a clear airway will improve  Description: Ability to maintain a clear airway will improve  Outcome: Ongoing     Problem: Breathing Pattern - Ineffective:  Goal: Ability to achieve and maintain a regular respiratory rate will improve  Description: Ability to achieve and maintain a regular respiratory rate will improve  Outcome: Ongoing     Problem: Gas Exchange - Impaired:  Goal: Levels of oxygenation will improve  Description: Levels of oxygenation will improve  Outcome: Ongoing

## 2020-03-28 NOTE — DISCHARGE SUMMARY
[facility] -- Physician Discharge Summary     Delroy Lozoya  1970  MRN: [id number]    Admit Date: 3/25/2020  Discharge Date: No discharge date for patient encounter. Attending MD: Melissa Camargo MD  Discharging MD: Melissa Camargo MD  PCP: [address] [place], [place] [address]shana De Sandrine[address] [phone]    Admission Diagnosis: Pneumonia [J18.9]  Pneumonia [J18.9]  DISCHARGE DIAGNOSIS: same    Full Hospital Problem List:  Active Hospital Problems    Diagnosis Date Noted    Pneumonia [J18.9] 03/25/2020    Acute respiratory failure with hypoxemia ([address] [J96.01] 03/25/2020    Hiatal hernia with GERD [K21.9, K44.9]     Essential hypertension [I10] 01/12/2018    Poorly controlled type 2 diabetes mellitus ([facility] [address] [E11.65] 01/12/2018    Hyperlipidemia LDL goal <70 [E78.5] 11/08/2017           Hospital Course:  45 y.o. female presents to the emergency department with repeat visit from yesterday.  The patient was seen here yesterday with possible coronavirus, she was discharged home and told to self isolate. Antionette Veloz was discharged with doxycycline after being found to have bibasilar pneumonia on chest film.  She does have history of supplemental oxygen use with COPD.  Patient states that her breathing today is worse and she is unable to ambulate due to extreme shortness of breath.  She reports taking Tylenol this morning with documented fever. She reports fatigue, headache, chills, body aches, shortness of breath, and dry cough.  No mitigating or exacerbating factors.  Constant since time of onset.  States that she is been sick for about 3 days. CXR in ER shows bibasilar airspace disease either due to atelectasis or developing pneumonia.     She is admitted, placed on empiric iv abx  Given her presentation, COVID-19 testing is done - thsi is a negative study    Her breathing improves on abx  By time of d/c, she is breathing easily on her baseline 2L  Converted to oral augmentin for Aeration of the right base is improved. No pleural effusion or pneumothorax is appreciated. Low lung volumes with left basilar linear opacities, most likely atelectasis. Xr Chest Portable    Result Date: 3/25/2020  EXAMINATION: ONE XRAY VIEW OF THE CHEST 3/25/2020 3:13 pm COMPARISON: 03/25/2020 HISTORY: ORDERING SYSTEM PROVIDED HISTORY: SOB TECHNOLOGIST PROVIDED HISTORY: Reason for exam:->SOB Reason for Exam: Cough Acuity: Acute Type of Exam: Initial FINDINGS: The lung volumes are low. No change in the bibasilar airspace disease either due to atelectasis or developing pneumonia. The cardiac silhouette is stable. There is no pneumothorax or pleural effusion. The right port terminates in the distal SVC. 1. No significant change. Xr Chest Portable    Result Date: 3/25/2020  EXAMINATION: ONE XRAY VIEW OF THE CHEST 3/25/2020 12:19 am COMPARISON: 03/16/2020 HISTORY: ORDERING SYSTEM PROVIDED HISTORY: sob TECHNOLOGIST PROVIDED HISTORY: Reason for exam:->sob Reason for Exam: Shortness of Breath (Patient with SOB, dizziness, and headache today. Has needed increased O2 for the past week, normally on 2L, now on 3L.  states pt has also been experiencing diarrhea and dry cough. ) Acuity: Acute Type of Exam: Initial FINDINGS: Extensive artifact projects over the patient, presumably on the patient's clothing. Lung volumes are low. There are bibasilar airspace opacities. The heart size is at the upper limits of normal.  There is no large pleural effusion or definite evidence for pneumothorax. Right chest port is again seen. Bibasilar atelectasis or pneumonia.          Discharge Exam:  /73   Pulse 84   Temp 98.6 °F (37 °C) (Oral)   Resp 16   Ht 5' 4\" (1.626 m)   Wt 185 lb 12.8 oz (84.3 kg)   SpO2 96%   BMI 31.89 kg/m²   General appearance: alert, appears stated age and cooperative  Head: Normocephalic, without obvious abnormality, atraumatic  Lungs: crackles bilat  Heart: regular rate

## 2020-03-29 LAB
BLOOD CULTURE, ROUTINE: NORMAL
CULTURE, BLOOD 2: NORMAL

## 2020-03-30 ENCOUNTER — CARE COORDINATION (OUTPATIENT)
Dept: CASE MANAGEMENT | Age: 50
End: 2020-03-30

## 2020-03-30 NOTE — PROGRESS NOTES
3.30.20 [address] [phone] was active with this pt prior to admit. Aware of discharge 3.28.20. Home care orders faxed to [facility]. Discharge planner notified.

## 2020-03-30 NOTE — CARE COORDINATION
[facility] Transitions Initial Follow Up Call    Call within 2 business days of discharge: Yes    Patient: Milo Hogan Patient : 1970   MRN: [id number]  Reason for Admission: Pneumonia  Discharge Date: 3/28/20 RARS: Readmission Risk Score: 43      Last Discharge [facility]       Complaint Diagnosis Description Type Department Provider    3/25/20 Cough Pneumonia due to organism ED to Hosp-Admission (Discharged) (ADMITTED) [facility]A Aisha Linton MD; Shari Alvares. .. Spoke with: Attempted to contact patient for follow up Care Transition call. Left message on Voice Mail to call office (number given) with any questions and an update on patient's condition since discharge. Will Follow Up at a later time.       Facility:     Non-face-to-face services provided:      Jefry Leblanc LPN     [facility] / [address] [place] [address] Transitions 24 Hour Call    Care Transitions Interventions         Follow Up  Future Appointments   Date Time Provider Leda Richter   2020  2:40 PM DO SELWYN Nunez IM MMA   6/3/2020  3:20 PM Jade Smith MD FF ENDO [facility]   2020  1:30 PM Duncan Contreras MD FF Cardio [facility]   2020  2:30 PM Huyen Lake MD PULM & CC [facility]       Jefry Leblanc LPN

## 2020-03-30 NOTE — DISCHARGE INSTR - COC
Enlargement of submandibular gland K11.1    Jaw pain R68.84    Pain in both lower extremities M79.604, M79.605    Vertigo R42    Fatigue R53.83    Edema of right lower extremity R60.0    Neck muscle spasm M62.838    Hot flashes R23.2    Chest pain R07.9    Excessive sweating R61    Tongue pain K14.6    Pneumonia J18.9    Acute respiratory failure with hypoxemia (HCC) J96.01       Isolation/Infection:   Isolation          No Isolation        Patient Infection Status     Infection Onset Added Last Indicated Last Indicated By Review Planned Expiration Resolved Resolved By    MDRO (multi-drug resistant organism) 03/13/19 03/15/19 03/13/19 Urine Culture        ESBL (Extended Spectrum Beta Lactamase)  18 Urine Culture        3/14/18; urine    Resolved    COVID-19 Rule Out 20 COVID-19 (Ordered)   20 Rule-Out Test Resulted          Nurse Assessment:  Last Vital Signs: /73   Pulse 84   Temp 98.6 °F (37 °C) (Oral)   Resp 16   Ht 5' 4\" (1.626 m)   Wt 185 lb 12.8 oz (84.3 kg)   SpO2 96%   BMI 31.89 kg/m²     Last documented pain score (0-10 scale): Pain Level: 5  Last Weight:   Wt Readings from Last 1 Encounters:   20 185 lb 12.8 oz (84.3 kg)     Mental Status:  {IP PT MENTAL STATUS:99387}    IV Access:  { ANILA IV ACCESS:341253490}    Nursing Mobility/ADLs:  Walking   {P DME YMMV:216042805}  Transfer  {P DME YKDH:489892720}  Bathing  {P DME YNYX:564920950}  Dressing  {P DME MML}  Toileting  {P DME ACVS:296580353}  Feeding  {P DME VCGN:053222899}  Med Admin  {P DME PIPF:015747430}  Med Delivery   { ANILA MED Delivery:319890986}    Wound Care Documentation and Therapy:        Elimination:  Continence:   · Bowel: {YES / FM:26626}  · Bladder: {YES / XQ:97205}  Urinary Catheter: {Urinary Catheter:698720590}   Colostomy/Ileostomy/Ileal Conduit: {YES / RA:14712}       Date of Last BM: ***  No intake or output data in the 24 hours

## 2020-03-31 ENCOUNTER — CARE COORDINATION (OUTPATIENT)
Dept: CASE MANAGEMENT | Age: 50
End: 2020-03-31

## 2020-03-31 NOTE — CARE COORDINATION
COVID-19 Screening Initial Follow-up Note    Patient contacted regarding COVID-19  risk. CTN contacted patient for post discharge assessment. CTN verified name and  with , Introduced self, explained CTN role, and reason for call due to risk factors for infection and/or exposure to COVID-19. Symptoms reviewed with family who verbalized the following symptoms:   Fever no    Fatigue no   Pain or aching joints no  Cough no  Shortness of breath no    Confusion or unusual change in mental status no    Chills or shaking no    Sweating no    Fast heart rate no    Fast breathing no    Dizziness/lightheadedness no    Less urine output no    Cold, clammy, and pale skin no  Low body temperature no       Due to onset/worsening of new symptoms, encounter routed to provider for escalation. Patient has following risk factors: heart failure, asthma, pneumonia     CTN reviewed discharge instructions, new medications, medical action plan and red flags such as increased shortness of breath, increasing fever and signs of decompensation with  who verbalized understanding. Discussed exposure protocols and quarantine with CDC Guidelines What to do if you are sick with coronavirus disease 2019  who was given an opportunity for questions and concerns. The  agrees to contact the Condu[facility] exposure line, local health department and PCP office for questions related to their healthcare. CTN provided contact information for future reference.       Plan for follow-up call in 5-7 days based on severity of symptoms and risk factors

## 2020-04-06 ENCOUNTER — HOSPITAL ENCOUNTER (OUTPATIENT)
Age: 50
Setting detail: SPECIMEN
Discharge: HOME | End: 2020-04-06
Payer: COMMERCIAL

## 2020-04-06 LAB
ANION GAP SERPL CALCULATED.3IONS-SCNC: 16 MMOL/L (ref 3–16)
BUN BLDV-MCNC: 33 MG/DL (ref 7–20)
CALCIUM SERPL-MCNC: 10.1 MG/DL (ref 8.3–10.6)
CHLORIDE BLD-SCNC: 94 MMOL/L (ref 99–110)
CO2: 27 MMOL/L (ref 21–32)
CREAT SERPL-MCNC: 1.5 MG/DL (ref 0.6–1.1)
GFR AFRICAN AMERICAN: 44
GFR NON-AFRICAN AMERICAN: 37
GLUCOSE BLD-MCNC: 146 MG/DL (ref 70–99)
POTASSIUM SERPL-SCNC: 4.5 MMOL/L (ref 3.5–5.1)
PRO-BNP: 80 PG/ML (ref 0–124)
SODIUM BLD-SCNC: 137 MMOL/L (ref 136–145)
URIC ACID, SERUM: 7.4 MG/DL (ref 2.6–6)

## 2020-04-06 PROCEDURE — 84550 ASSAY OF BLOOD/URIC ACID: CPT

## 2020-04-06 PROCEDURE — 36415 COLL VENOUS BLD VENIPUNCTURE: CPT

## 2020-04-06 PROCEDURE — 80048 BASIC METABOLIC PNL TOTAL CA: CPT

## 2020-04-06 PROCEDURE — 83880 ASSAY OF NATRIURETIC PEPTIDE: CPT

## 2020-04-07 ENCOUNTER — TELEPHONE (OUTPATIENT)
Dept: CARDIOLOGY CLINIC | Age: 50
End: 2020-04-07

## 2020-04-07 ENCOUNTER — CARE COORDINATION (OUTPATIENT)
Dept: CASE MANAGEMENT | Age: 50
End: 2020-04-07

## 2020-04-07 NOTE — TELEPHONE ENCOUNTER
Am Meaghan asking if Francisco Sandoval will continue to sign home care orders.  Please call to advise

## 2020-04-07 NOTE — CARE COORDINATION
You Patient resolved from the Care Transitions episode on 4/7/20 per pt request  Patient/family has been provided the following resources and education related to COVID-19:                         Signs, symptoms and red flags related to COVID-19            CDC exposure and quarantine guidelines            Conduit exposure contact - [phone]            Contact for their local Department of Health                 Patient currently reports that the following symptoms have improved:  fever, fatigue, pain or aching joints, cough, shortness of breath, confusion or unusual change in mental status, chills or shaking, sweating, fast heart rate, fast breathing, dizziness/lightheadedness, less urine output, cold, clammy, and pale skin, low body temperature and no new/worsening symptoms     No further outreach scheduled with this CTN/ACM. Episode of Care resolved. Patient has this CTN/ACM contact information if future needs arise.

## 2020-04-08 ENCOUNTER — TELEPHONE (OUTPATIENT)
Dept: CARDIOLOGY CLINIC | Age: 50
End: 2020-04-08

## 2020-04-13 ENCOUNTER — TELEPHONE (OUTPATIENT)
Dept: CARDIOLOGY CLINIC | Age: 50
End: 2020-04-13

## 2020-04-20 ENCOUNTER — HOSPITAL ENCOUNTER (OUTPATIENT)
Age: 50
Setting detail: SPECIMEN
Discharge: HOME | End: 2020-04-20
Payer: COMMERCIAL

## 2020-04-20 LAB
ANION GAP SERPL CALCULATED.3IONS-SCNC: 11 MMOL/L (ref 3–16)
BUN BLDV-MCNC: 32 MG/DL (ref 7–20)
CALCIUM SERPL-MCNC: 9.2 MG/DL (ref 8.3–10.6)
CHLORIDE BLD-SCNC: 99 MMOL/L (ref 99–110)
CO2: 28 MMOL/L (ref 21–32)
CREAT SERPL-MCNC: 1.2 MG/DL (ref 0.6–1.1)
GFR AFRICAN AMERICAN: 57
GFR NON-AFRICAN AMERICAN: 47
GLUCOSE BLD-MCNC: 144 MG/DL (ref 70–99)
POTASSIUM SERPL-SCNC: 4.5 MMOL/L (ref 3.5–5.1)
PRO-BNP: 102 PG/ML (ref 0–124)
SODIUM BLD-SCNC: 138 MMOL/L (ref 136–145)
URIC ACID, SERUM: 5.2 MG/DL (ref 2.6–6)

## 2020-04-20 PROCEDURE — 36415 COLL VENOUS BLD VENIPUNCTURE: CPT

## 2020-04-20 PROCEDURE — 80048 BASIC METABOLIC PNL TOTAL CA: CPT

## 2020-04-20 PROCEDURE — 83880 ASSAY OF NATRIURETIC PEPTIDE: CPT

## 2020-04-20 PROCEDURE — 84550 ASSAY OF BLOOD/URIC ACID: CPT

## 2020-04-20 RX ORDER — AMOXICILLIN 250 MG
CAPSULE ORAL
Qty: 120 TABLET | Refills: 0 | Status: SHIPPED | OUTPATIENT
Start: 2020-04-20 | End: 2020-05-19

## 2020-04-20 RX ORDER — OMEPRAZOLE 20 MG/1
CAPSULE, DELAYED RELEASE ORAL
Qty: 60 CAPSULE | Refills: 0 | Status: SHIPPED | OUTPATIENT
Start: 2020-04-20 | End: 2020-05-19

## 2020-04-20 RX ORDER — LORATADINE 10 MG/1
TABLET ORAL
Qty: 30 TABLET | Refills: 0 | Status: SHIPPED | OUTPATIENT
Start: 2020-04-20 | End: 2020-05-19

## 2020-04-21 RX ORDER — ERTUGLIFLOZIN 5 MG/1
TABLET, FILM COATED ORAL
Qty: 30 TABLET | Refills: 0 | Status: SHIPPED | OUTPATIENT
Start: 2020-04-21 | End: 2020-05-19

## 2020-04-21 RX ORDER — MELATONIN
Qty: 90 TABLET | Refills: 0 | Status: SHIPPED | OUTPATIENT
Start: 2020-04-21 | End: 2020-06-03 | Stop reason: SDUPTHER

## 2020-04-21 RX ORDER — INSULIN DEGLUDEC 200 U/ML
INJECTION, SOLUTION SUBCUTANEOUS
Qty: 15 ML | Refills: 1 | Status: SHIPPED | OUTPATIENT
Start: 2020-04-21 | End: 2020-05-19 | Stop reason: SDUPTHER

## 2020-04-22 ENCOUNTER — TELEPHONE (OUTPATIENT)
Dept: CARDIOLOGY CLINIC | Age: 50
End: 2020-04-22

## 2020-04-22 NOTE — TELEPHONE ENCOUNTER
----- Message from HERMES Wagoner CNP sent at 4/21/2020  2:28 PM EDT -----  Labs stable, no change in meds.  Loren Costa

## 2020-04-29 ENCOUNTER — VIRTUAL VISIT (OUTPATIENT)
Dept: INTERNAL MEDICINE CLINIC | Age: 50
End: 2020-04-29
Payer: COMMERCIAL

## 2020-04-29 PROBLEM — R41.840 INATTENTION: Status: ACTIVE | Noted: 2020-04-29

## 2020-04-29 PROBLEM — R73.9 HYPERGLYCEMIA: Status: ACTIVE | Noted: 2020-04-29

## 2020-04-29 PROBLEM — F33.2 SEVERE EPISODE OF RECURRENT MAJOR DEPRESSIVE DISORDER (HCC): Status: ACTIVE | Noted: 2020-04-29

## 2020-04-29 PROBLEM — G44.209 TENSION TYPE HEADACHE: Status: ACTIVE | Noted: 2020-04-29

## 2020-04-29 PROCEDURE — 99214 OFFICE O/P EST MOD 30 MIN: CPT | Performed by: INTERNAL MEDICINE

## 2020-04-29 PROCEDURE — G9899 SCRN MAM PERF RSLTS DOC: HCPCS | Performed by: INTERNAL MEDICINE

## 2020-04-29 PROCEDURE — 3017F COLORECTAL CA SCREEN DOC REV: CPT | Performed by: INTERNAL MEDICINE

## 2020-04-29 PROCEDURE — G8427 DOCREV CUR MEDS BY ELIG CLIN: HCPCS | Performed by: INTERNAL MEDICINE

## 2020-04-29 RX ORDER — BUTALBITAL, ACETAMINOPHEN AND CAFFEINE 50; 325; 40 MG/1; MG/1; MG/1
1 TABLET ORAL EVERY 6 HOURS PRN
Qty: 30 TABLET | Refills: 1 | Status: SHIPPED | OUTPATIENT
Start: 2020-04-29 | End: 2020-10-28 | Stop reason: ALTCHOICE

## 2020-04-29 RX ORDER — DIAZEPAM 5 MG/1
5 TABLET ORAL 2 TIMES DAILY PRN
COMMUNITY

## 2020-04-29 NOTE — PROGRESS NOTES
disease) ([facility]     Depression     Diabetes mellitus ([facility]     Diabetic polyneuropathy associated with type 2 diabetes mellitus ([facility] 2018    Dysthymia 2018    ESBL (extended spectrum beta-lactamase) producing bacteria infection 2018    urine    Fibromyalgia     Gastric ulcer, unspecified as acute or chronic, without mention of hemorrhage, perforation, or obstruction     GERD (gastroesophageal reflux disease)     Gout     HIGH CHOLESTEROL     Hypertension     Hypothyroidism     Severe persistent asthma without complication 3/4/2920    Thyroid disease     TIA (transient ischemic attack)     with occasional left leg and hand weakness       Past Surgical History:   Procedure Laterality Date    BREAST SURGERY      left mastectomy    CARPAL TUNNEL RELEASE      Bilateral    FINGER CONTRACTURE SURGERY      HYSTERECTOMY  2005    [facility]    TONSILLECTOMY      UPPER GASTROINTESTINAL ENDOSCOPY      stretched esophagous        Family History   Problem Relation Age of Onset    Asthma Other     Cancer Other     Depression Other     Diabetes Other     Hypertension Other     High Cholesterol Other     Migraines Other     Heart Attack Father 72         of MI    High Blood Pressure Mother     Diabetes Mother        Allergies   Allergen Reactions    Iv Dye [Iodides] Anaphylaxis     allergic to ct scan dye, not the MRI    Trazodone And Nefazodone Other (See Comments)     Makes patient feel very sluggish       Current Outpatient Medications   Medication Sig Dispense Refill    diazePAM (VALIUM) 5 MG tablet Take 5 mg by mouth every 8 hours as needed for Anxiety.       cariprazine hcl (VRAYLAR) 1.5 MG capsule Take 1.5 mg by mouth daily      butalbital-acetaminophen-caffeine (FIORICET, ESGIC) -40 MG per tablet Take 1 tablet by mouth every 6 hours as needed for Headaches 30 tablet 1    STEGLATRO 5 MG TABS TAKE 1 TABLET BY MOUTH ONE TIME A DAY  30 tablet 0    Cholecalciferol (VITAMIN release tablet TAKE 1 TABLET BY MOUTH TWO TIMES A DAY  180 tablet 3    Glucagon 1 MG/0.2ML SOSY To be used in case of severe hypoglycemia 2 Syringe 2    leflunomide (ARAVA) 10 MG tablet Take 5 mg by mouth daily       metoprolol tartrate (LOPRESSOR) 25 MG tablet Take 1 tablet by mouth 2 times daily 180 tablet 3    simvastatin (ZOCOR) 20 MG tablet TAKE 1 TABLET BY MOUTH nightly 90 tablet 3    levothyroxine (SYNTHROID) 150 MCG tablet Take 1 tablet by mouth every morning (before breakfast) 90 tablet 3    metolazone (ZAROXOLYN) 2.5 MG tablet TAKE 1 TABLET BY MOUTH ONE TIME A DAY AS NEEDED 30 tablet 0    colchicine (COLCRYS) 0.6 MG tablet Take 0.6 mg by mouth 2 times daily       albuterol sulfate  (90 Base) MCG/ACT inhaler Inhale 2 puffs into the lungs every 6 hours as needed for Wheezing 1 Inhaler 6    OXYGEN Inhale 2 L into the lungs nightly Sometimes with activity      oxyCODONE (OXYCONTIN) 20 MG extended release tablet Take 20 mg by mouth every 12 hours.  aspirin 81 MG EC tablet Take 81 mg by mouth daily      sulfaSALAzine (AZULFIDINE) 500 MG tablet Take 1,000 mg by mouth 2 times daily       calcium carbonate-vitamin D (CALCIUM 600+D) 600-200 MG-UNIT TABS Take 1 tablet by mouth 2 times daily      benztropine (COGENTIN) 1 MG tablet Take 1 mg by mouth nightly       folic acid (FOLVITE) 1 MG tablet Take 1 mg by mouth daily      lurasidone (LATUDA) 40 MG TABS tablet Take 20 mg by mouth Daily with supper       oxyCODONE-acetaminophen (PERCOCET)  MG per tablet Take 1 tablet by mouth every 4 hours as needed for Pain . Earliest Fill Date: 6/8/17 100 tablet 0    duloxetine (CYMBALTA) 60 MG capsule Take 60 mg by mouth 2 times daily. No current facility-administered medications for this visit. ROS:  Positive for fatigue, SOB, HA, depression, hyperglycemia  Negative for dysuria, changes in bowels, weight changes, congestion, loss of taste or smell.      PHYSICAL EXAMINATION:  [

## 2020-05-04 ENCOUNTER — HOSPITAL ENCOUNTER (OUTPATIENT)
Age: 50
Setting detail: SPECIMEN
Discharge: HOME | End: 2020-05-04
Payer: COMMERCIAL

## 2020-05-04 LAB
ANION GAP SERPL CALCULATED.3IONS-SCNC: 14 MMOL/L (ref 3–16)
BUN BLDV-MCNC: 45 MG/DL (ref 7–20)
CALCIUM SERPL-MCNC: 9.7 MG/DL (ref 8.3–10.6)
CHLORIDE BLD-SCNC: 94 MMOL/L (ref 99–110)
CO2: 30 MMOL/L (ref 21–32)
CREAT SERPL-MCNC: 1.5 MG/DL (ref 0.6–1.1)
GFR AFRICAN AMERICAN: 44
GFR NON-AFRICAN AMERICAN: 37
GLUCOSE BLD-MCNC: 90 MG/DL (ref 70–99)
POTASSIUM SERPL-SCNC: 4 MMOL/L (ref 3.5–5.1)
PRO-BNP: 77 PG/ML (ref 0–124)
SODIUM BLD-SCNC: 138 MMOL/L (ref 136–145)

## 2020-05-04 PROCEDURE — 80048 BASIC METABOLIC PNL TOTAL CA: CPT

## 2020-05-04 PROCEDURE — 36415 COLL VENOUS BLD VENIPUNCTURE: CPT

## 2020-05-04 PROCEDURE — 83880 ASSAY OF NATRIURETIC PEPTIDE: CPT

## 2020-05-05 ENCOUNTER — TELEPHONE (OUTPATIENT)
Dept: CARDIOLOGY CLINIC | Age: 50
End: 2020-05-05

## 2020-05-05 NOTE — TELEPHONE ENCOUNTER
----- Message from Oscar Carlson MD sent at 5/5/2020  5:04 PM EDT -----  Please call patient. Her labs look really good. No changes.   ARETHA

## 2020-05-05 NOTE — TELEPHONE ENCOUNTER
Notes recorded by Graeme Wall MD on 5/5/2020 at 5:04 PM EDT  Please call patient. Vicente Tinoco labs look really good.  No changes.  ARETHA    Spoke to patient she verbalized understanding, all questions answered. Abdomen soft, nontender, nondistended, bowel sounds present in all 4 quadrants.

## 2020-05-06 ENCOUNTER — TELEPHONE (OUTPATIENT)
Dept: INTERNAL MEDICINE CLINIC | Age: 50
End: 2020-05-06

## 2020-05-18 ENCOUNTER — TELEPHONE (OUTPATIENT)
Dept: CARDIOLOGY CLINIC | Age: 50
End: 2020-05-18

## 2020-05-19 ENCOUNTER — HOSPITAL ENCOUNTER (OUTPATIENT)
Age: 50
Setting detail: SPECIMEN
Discharge: HOME | End: 2020-05-19
Payer: COMMERCIAL

## 2020-05-19 LAB
ANION GAP SERPL CALCULATED.3IONS-SCNC: 11 MMOL/L (ref 3–16)
BUN BLDV-MCNC: 44 MG/DL (ref 7–20)
CALCIUM SERPL-MCNC: 9.7 MG/DL (ref 8.3–10.6)
CHLORIDE BLD-SCNC: 100 MMOL/L (ref 99–110)
CO2: 28 MMOL/L (ref 21–32)
CREAT SERPL-MCNC: 1.5 MG/DL (ref 0.6–1.1)
GFR AFRICAN AMERICAN: 44
GFR NON-AFRICAN AMERICAN: 37
GLUCOSE BLD-MCNC: 284 MG/DL (ref 70–99)
POTASSIUM SERPL-SCNC: 4.1 MMOL/L (ref 3.5–5.1)
PRO-BNP: 59 PG/ML (ref 0–124)
SODIUM BLD-SCNC: 139 MMOL/L (ref 136–145)

## 2020-05-19 PROCEDURE — 80048 BASIC METABOLIC PNL TOTAL CA: CPT

## 2020-05-19 PROCEDURE — 36415 COLL VENOUS BLD VENIPUNCTURE: CPT

## 2020-05-19 PROCEDURE — 83880 ASSAY OF NATRIURETIC PEPTIDE: CPT

## 2020-05-19 RX ORDER — OMEPRAZOLE 20 MG/1
CAPSULE, DELAYED RELEASE ORAL
Qty: 60 CAPSULE | Refills: 0 | Status: SHIPPED | OUTPATIENT
Start: 2020-05-19 | End: 2020-06-22

## 2020-05-19 RX ORDER — ERTUGLIFLOZIN 5 MG/1
TABLET, FILM COATED ORAL
Qty: 30 TABLET | Refills: 0 | Status: SHIPPED | OUTPATIENT
Start: 2020-05-19 | End: 2020-06-03 | Stop reason: SDUPTHER

## 2020-05-19 RX ORDER — FERROUS SULFATE 325(65) MG
TABLET ORAL
Qty: 180 TABLET | Refills: 0 | Status: SHIPPED | OUTPATIENT
Start: 2020-05-19 | End: 2020-08-17

## 2020-05-19 RX ORDER — AMOXICILLIN 250 MG
CAPSULE ORAL
Qty: 120 TABLET | Refills: 0 | Status: SHIPPED | OUTPATIENT
Start: 2020-05-19 | End: 2020-06-04

## 2020-05-19 RX ORDER — LORATADINE 10 MG/1
TABLET ORAL
Qty: 30 TABLET | Refills: 0 | Status: SHIPPED | OUTPATIENT
Start: 2020-05-19 | End: 2020-06-22

## 2020-05-19 RX ORDER — INSULIN DEGLUDEC 200 U/ML
INJECTION, SOLUTION SUBCUTANEOUS
Qty: 15 ML | Refills: 1 | Status: SHIPPED | OUTPATIENT
Start: 2020-05-19 | End: 2020-06-03 | Stop reason: SDUPTHER

## 2020-05-19 NOTE — TELEPHONE ENCOUNTER
Requested Prescriptions     Pending Prescriptions Disp Refills    Insulin Degludec (TRESIBA FLEXTOUCH) 200 UNIT/ML SOPN 15 mL 1     Sig: inject 150 units subcutaneously once daily

## 2020-05-20 ENCOUNTER — TELEPHONE (OUTPATIENT)
Dept: CARDIOLOGY CLINIC | Age: 50
End: 2020-05-20

## 2020-05-20 NOTE — TELEPHONE ENCOUNTER
----- Message from Carmelita Clarke MD sent at 5/20/2020  5:33 PM EDT -----  Please call patient. Labs look good. No changes.   ARETHA

## 2020-06-01 ENCOUNTER — HOSPITAL ENCOUNTER (OUTPATIENT)
Age: 50
Setting detail: SPECIMEN
Discharge: HOME | End: 2020-06-01
Payer: COMMERCIAL

## 2020-06-01 LAB
ANION GAP SERPL CALCULATED.3IONS-SCNC: 13 MMOL/L (ref 3–16)
BUN BLDV-MCNC: 28 MG/DL (ref 7–20)
CALCIUM SERPL-MCNC: 9.8 MG/DL (ref 8.3–10.6)
CHLORIDE BLD-SCNC: 90 MMOL/L (ref 99–110)
CO2: 31 MMOL/L (ref 21–32)
CREAT SERPL-MCNC: 1.5 MG/DL (ref 0.6–1.1)
GFR AFRICAN AMERICAN: 44
GFR NON-AFRICAN AMERICAN: 37
GLUCOSE BLD-MCNC: 201 MG/DL (ref 70–99)
POTASSIUM SERPL-SCNC: 4.3 MMOL/L (ref 3.5–5.1)
PRO-BNP: 117 PG/ML (ref 0–124)
SODIUM BLD-SCNC: 134 MMOL/L (ref 136–145)
URIC ACID, SERUM: 5.9 MG/DL (ref 2.6–6)

## 2020-06-01 PROCEDURE — 84550 ASSAY OF BLOOD/URIC ACID: CPT

## 2020-06-01 PROCEDURE — 36415 COLL VENOUS BLD VENIPUNCTURE: CPT

## 2020-06-01 PROCEDURE — 83880 ASSAY OF NATRIURETIC PEPTIDE: CPT

## 2020-06-01 PROCEDURE — 80048 BASIC METABOLIC PNL TOTAL CA: CPT

## 2020-06-03 ENCOUNTER — TELEPHONE (OUTPATIENT)
Dept: CARDIOLOGY CLINIC | Age: 50
End: 2020-06-03

## 2020-06-03 ENCOUNTER — VIRTUAL VISIT (OUTPATIENT)
Dept: ENDOCRINOLOGY | Age: 50
End: 2020-06-03
Payer: COMMERCIAL

## 2020-06-03 PROCEDURE — 3051F HG A1C>EQUAL 7.0%<8.0%: CPT | Performed by: INTERNAL MEDICINE

## 2020-06-03 PROCEDURE — G9899 SCRN MAM PERF RSLTS DOC: HCPCS | Performed by: INTERNAL MEDICINE

## 2020-06-03 PROCEDURE — G8427 DOCREV CUR MEDS BY ELIG CLIN: HCPCS | Performed by: INTERNAL MEDICINE

## 2020-06-03 PROCEDURE — 3017F COLORECTAL CA SCREEN DOC REV: CPT | Performed by: INTERNAL MEDICINE

## 2020-06-03 PROCEDURE — 2022F DILAT RTA XM EVC RTNOPTHY: CPT | Performed by: INTERNAL MEDICINE

## 2020-06-03 PROCEDURE — 99214 OFFICE O/P EST MOD 30 MIN: CPT | Performed by: INTERNAL MEDICINE

## 2020-06-03 RX ORDER — ERTUGLIFLOZIN 5 MG/1
TABLET, FILM COATED ORAL
Qty: 30 TABLET | Refills: 2 | Status: SHIPPED | OUTPATIENT
Start: 2020-06-03 | End: 2020-10-05

## 2020-06-03 RX ORDER — INSULIN ASPART 100 [IU]/ML
30-50 INJECTION, SOLUTION INTRAVENOUS; SUBCUTANEOUS
Qty: 10 PEN | Refills: 3 | Status: SHIPPED | OUTPATIENT
Start: 2020-06-03 | End: 2020-07-20

## 2020-06-03 RX ORDER — LEVOTHYROXINE SODIUM 0.15 MG/1
150 TABLET ORAL
Qty: 90 TABLET | Refills: 3 | Status: SHIPPED | OUTPATIENT
Start: 2020-06-03 | End: 2021-06-09

## 2020-06-03 RX ORDER — INSULIN DEGLUDEC 200 U/ML
INJECTION, SOLUTION SUBCUTANEOUS
Qty: 15 ML | Refills: 1 | Status: SHIPPED | OUTPATIENT
Start: 2020-06-03 | End: 2020-06-22 | Stop reason: SDUPTHER

## 2020-06-03 RX ORDER — MELATONIN
Qty: 90 TABLET | Refills: 5 | Status: SHIPPED | OUTPATIENT
Start: 2020-06-03 | End: 2021-02-04 | Stop reason: SDUPTHER

## 2020-06-03 NOTE — PROGRESS NOTES
Karen Stephen is a 45 y.o. female is seen for the management of type 2 diabetes and hypothyroidism . Patient has complex medical problems including coronary artery disease , DOMENIC, CHF, breast cancer, fibromyalgia, hyperlipidemia, hypertension, obesity, asthma and depression  T2DM which is uncontrolled and is complicated with nephropathy and DOMENIC . She got diabetic education in the past and at one point she was on an insulin pump. She still has hypoglycemia awareness tends to watch her diet somewhat but her depression hinders in managing her diabetes. Most of her medications are managed by her . Arnulfo See was diagnosed with hypothyroidism in march 2010 and has been on Lt4 since then   she had GDM with her 2 kids ---she has been on insulin for years she has been suffering form DAN and Peripheral neuropathy alonng with Depression and has been on Lyrica & Cymbalta with suoptimal relief she has s/s suggestive of DAN gastroparesis , orthostasis. Arnulfo See also has hypertension , GERD , hyperlipidemia ,she also has breast cancer s/p mastectomy and radiation and chemo &is on Tamixifen   she also has Asthma she is on Cpap for sleep apnea and wears Oxygen       INTERIM:    Diabetes   She presents for her follow-up diabetic visit. She has type 2 diabetes mellitus. No MedicAlert identification noted. The initial diagnosis of diabetes was made 17 years ago. Her disease course has been stable. Hypoglycemia symptoms include nervousness/anxiousness and sweats. Pertinent negatives for hypoglycemia include no dizziness or headaches. Associated symptoms include fatigue and weakness. There are no hypoglycemic complications. Symptoms are stable. Diabetic complications include autonomic neuropathy, heart disease and peripheral neuropathy. Risk factors for coronary artery disease include diabetes mellitus, post-menopausal, hypertension and dyslipidemia.  Current diabetic treatment includes intensive insulin member of club or organization: Not on file     Attends meetings of clubs or organizations: Not on file     Relationship status: Not on file    Intimate partner violence     Fear of current or ex partner: Not on file     Emotionally abused: Not on file     Physically abused: Not on file     Forced sexual activity: Not on file   Other Topics Concern    Not on file   Social History Narrative    Not on file     Family History   Problem Relation Age of Onset    Asthma Other     Cancer Other     Depression Other     Diabetes Other     Hypertension Other     High Cholesterol Other     Migraines Other     Heart Attack Father 72         of MI    High Blood Pressure Mother     Diabetes Mother      Current Outpatient Medications   Medication Sig Dispense Refill    vitamin D3 (CHOLECALCIFEROL) 25 MCG (1000 UT) TABS tablet TAKE 3 TABLETS BY MOUTH ONE TIME A DAY 90 tablet 5    Insulin Degludec (TRESIBA FLEXTOUCH) 200 UNIT/ML SOPN inject 150 units subcutaneously once daily 15 mL 1    levothyroxine (SYNTHROID) 150 MCG tablet Take 1 tablet by mouth every morning (before breakfast) 90 tablet 3    Ertugliflozin L-PyroglutamicAc (STEGLATRO) 5 MG TABS Take 1 tablet by mouth one time daily. 30 tablet 2    insulin aspart (NOVOLOG FLEXPEN) 100 UNIT/ML injection pen Inject 30-50 Units into the skin 3 times daily (before meals) 10 pen 3    omeprazole (PRILOSEC) 20 MG delayed release capsule TAKE 1 CAPSULE BY MOUTH TWO TIMES A DAY  60 capsule 0    loratadine (CLARITIN) 10 MG tablet TAKE 1 TABLET BY MOUTH ONE TIME A DAY  30 tablet 0    senna-docusate (SM SENNA-S) 8.6-50 MG per tablet TAKE 2 TABLETS BY MOUTH TWO TIMES A  tablet 0    ferrous sulfate (IRON 325) 325 (65 Fe) MG tablet TAKE 1 TABLET BY MOUTH TWO TIMES A DAY WITH MEALS 180 tablet 0    diazePAM (VALIUM) 5 MG tablet Take 5 mg by mouth every 8 hours as needed for Anxiety.       cariprazine hcl (VRAYLAR) 1.5 MG capsule Take 1.5 mg by mouth daily      nodes  Genitourinary  Denies any frequent urination denies any nighttime urination  Skin  Denies any acne denies any changes in facial body hair denies any non healing wounds Musculoskeletal   denies any joint or bone pain ,denies any muscle weakness ,denies any new fracture  Endocrine  Denies any excessive thirst denies any excessive heat intolerance or cold intolerance . OBJECTIVE:  There were no vitals taken for this visit.    Wt Readings from Last 3 Encounters:   03/28/20 185 lb 12.8 oz (84.3 kg)   03/24/20 189 lb (85.7 kg)   02/12/20 193 lb (87.5 kg)         Constitutional: no acute distress, well appearing and well nourished  Psychiatric: oriented to person, place and time, judgement and insight and normal, recent and remote memory intact and mood and affect are normal  Skin: skin and subcutaneous tissue is normal without visible mass,   Head and Face: visual inspection  of head and face revealed no abnormalities  Eyes: visual inspection showed no lid or conjunctival swelling, erythema or discharge, pupils are normal, equal, round  Ears/Nose: external inspection of ears and nose revealed no abnormalities, hearing is grossly normal  Oropharynx/Mouth/Face: lips, tongue and gums appear  normal with no lesions, the voice quality was normal  Neck: neck appears symmetric, with no visible masses,   Pulmonary: no increased work of breathing or signs of respiratory distress,  Musculoskeletal: normal on inspection    Neurological: normal coordination and normal general cortical function  Lab Results   Component Value Date    LABA1C 7.8 03/26/2020    LABA1C 7.3 11/19/2019    LABA1C 8.5 08/15/2019         ASSESSMENT/PLAN:    ---- Type 2 diabetes mellitus with  complication, with long-term current use of insulin also has DAN and gastroparesis --UNCONTROLLED 8.5>>7.3>>7.8  Fasting glucose >150 She still has erratic sleeping and eating habits and tends not to exercise regularly  Currently taking 150  Bid  units of tresiba

## 2020-06-04 NOTE — PROGRESS NOTES
loss or vertigo. No mouth sores or sore throat. · Cardiovascular: Reviewed in HPI  · Respiratory: No cough or wheezing, no sputum production. No hematemesis. · Gastrointestinal: No abdominal pain, appetite loss, blood in stools. No change in bowel or bladder habits. · Genitourinary: No dysuria, trouble voiding, or hematuria. · Musculoskeletal:  No gait disturbance, weakness or joint complaints. · Integumentary: No rash or pruritis. · Neurological: No headache, diplopia, change in muscle strength, numbness or tingling. No change in gait, balance, coordination, mood, affect, memory, mentation, behavior. · Psychiatric: No anxiety, no depression. · Endocrine: No malaise, fatigue or temperature intolerance. No excessive thirst, fluid intake, or urination. No tremor. · Hematologic/Lymphatic: No abnormal bruising or bleeding, blood clots or swollen lymph nodes. · Allergic/Immunologic: No nasal congestion or hives. Physical Examination:    /62   Pulse 84   Wt 186 lb (84.4 kg)   SpO2 91%   BMI 31.93 kg/m²       Wt Readings from Last 3 Encounters:   06/22/20 186 lb (84.4 kg)   03/28/20 185 lb 12.8 oz (84.3 kg)   03/24/20 189 lb (85.7 kg)     BP Readings from Last 3 Encounters:   06/22/20 104/62   03/28/20 115/73   03/25/20 130/76     Constitutional and General Appearance:   WD/WN, less swollen than usual  HEENT:  NC/AT  XANDER  No problems with hearing  Skin:  Warm, dry  Respiratory:  · Normal excursion and expansion without use of accessory muscles  · Resp Auscultation: Normal breath sounds without dullness  Cardiovascular:  · The apical impulses not displaced  · Heart tones are crisp and normal  · Cervical veins are not engorged  · The carotid upstroke is normal in amplitude and contour without delay or bruit  · JVP 8-9 cm H2O  RRR with nl S1 and S2 without m,r,g  · Peripheral pulses are symmetrical and full  · There is no clubbing, cyanosis of the extremities. Bilateral  hand edema is increased.

## 2020-06-10 ENCOUNTER — TELEPHONE (OUTPATIENT)
Dept: CARDIOLOGY CLINIC | Age: 50
End: 2020-06-10

## 2020-06-10 NOTE — TELEPHONE ENCOUNTER
Called and spoke with spouse and informed him of message from Dr Cari Jimenez below. Also called to speak with [place] who was not available for comment. lmor from Dr Cari Jimenez.

## 2020-06-11 ENCOUNTER — HOSPITAL ENCOUNTER (OUTPATIENT)
Age: 50
Setting detail: SPECIMEN
Discharge: HOME | End: 2020-06-11
Payer: COMMERCIAL

## 2020-06-11 LAB
ALBUMIN SERPL-MCNC: 4.8 G/DL (ref 3.4–5)
ALP BLD-CCNC: 135 U/L (ref 40–129)
ALT SERPL-CCNC: 34 U/L (ref 10–40)
ANION GAP SERPL CALCULATED.3IONS-SCNC: 13 MMOL/L (ref 3–16)
AST SERPL-CCNC: 36 U/L (ref 15–37)
BASOPHILS ABSOLUTE: 0 K/UL (ref 0–0.2)
BASOPHILS RELATIVE PERCENT: 0.7 %
BILIRUB SERPL-MCNC: <0.2 MG/DL (ref 0–1)
BILIRUBIN DIRECT: <0.2 MG/DL (ref 0–0.3)
BILIRUBIN, INDIRECT: ABNORMAL MG/DL (ref 0–1)
BUN BLDV-MCNC: 28 MG/DL (ref 7–20)
CALCIUM SERPL-MCNC: 10.3 MG/DL (ref 8.3–10.6)
CHLORIDE BLD-SCNC: 92 MMOL/L (ref 99–110)
CO2: 34 MMOL/L (ref 21–32)
CREAT SERPL-MCNC: 1.4 MG/DL (ref 0.6–1.1)
EOSINOPHILS ABSOLUTE: 0.1 K/UL (ref 0–0.6)
EOSINOPHILS RELATIVE PERCENT: 1.8 %
GFR AFRICAN AMERICAN: 48
GFR NON-AFRICAN AMERICAN: 40
GLUCOSE BLD-MCNC: 125 MG/DL (ref 70–99)
HCT VFR BLD CALC: 42.6 % (ref 36–48)
HEMOGLOBIN: 14 G/DL (ref 12–16)
LYMPHOCYTES ABSOLUTE: 2.4 K/UL (ref 1–5.1)
LYMPHOCYTES RELATIVE PERCENT: 38.1 %
MCH RBC QN AUTO: 29.5 PG (ref 26–34)
MCHC RBC AUTO-ENTMCNC: 33 G/DL (ref 31–36)
MCV RBC AUTO: 89.4 FL (ref 80–100)
MONOCYTES ABSOLUTE: 0.5 K/UL (ref 0–1.3)
MONOCYTES RELATIVE PERCENT: 8.3 %
NEUTROPHILS ABSOLUTE: 3.2 K/UL (ref 1.7–7.7)
NEUTROPHILS RELATIVE PERCENT: 51.1 %
PDW BLD-RTO: 13.8 % (ref 12.4–15.4)
PLATELET # BLD: 266 K/UL (ref 135–450)
PMV BLD AUTO: 10.2 FL (ref 5–10.5)
POTASSIUM SERPL-SCNC: 4.6 MMOL/L (ref 3.5–5.1)
PRO-BNP: 125 PG/ML (ref 0–124)
RBC # BLD: 4.76 M/UL (ref 4–5.2)
SEDIMENTATION RATE, ERYTHROCYTE: 105 MM/HR (ref 0–30)
SODIUM BLD-SCNC: 139 MMOL/L (ref 136–145)
TOTAL PROTEIN: 8.3 G/DL (ref 6.4–8.2)
WBC # BLD: 6.3 K/UL (ref 4–11)

## 2020-06-11 PROCEDURE — 85025 COMPLETE CBC W/AUTO DIFF WBC: CPT

## 2020-06-11 PROCEDURE — 83880 ASSAY OF NATRIURETIC PEPTIDE: CPT

## 2020-06-11 PROCEDURE — 80076 HEPATIC FUNCTION PANEL: CPT

## 2020-06-11 PROCEDURE — 80048 BASIC METABOLIC PNL TOTAL CA: CPT

## 2020-06-11 PROCEDURE — 85652 RBC SED RATE AUTOMATED: CPT

## 2020-06-11 PROCEDURE — 36415 COLL VENOUS BLD VENIPUNCTURE: CPT

## 2020-06-11 PROCEDURE — 86140 C-REACTIVE PROTEIN: CPT

## 2020-06-12 ENCOUNTER — TELEPHONE (OUTPATIENT)
Dept: CARDIOLOGY CLINIC | Age: 50
End: 2020-06-12

## 2020-06-12 LAB — C-REACTIVE PROTEIN: 8.1 MG/L (ref 0–5.1)

## 2020-06-12 NOTE — TELEPHONE ENCOUNTER
----- Message from Arthur Rodríguez MD sent at 6/12/2020  9:55 AM EDT -----  Please call patient. Labs look good. No changes.   ARETHA

## 2020-06-15 ENCOUNTER — HOSPITAL ENCOUNTER (OUTPATIENT)
Age: 50
Setting detail: SPECIMEN
Discharge: HOME | End: 2020-06-15
Payer: COMMERCIAL

## 2020-06-15 LAB
ANION GAP SERPL CALCULATED.3IONS-SCNC: 11 MMOL/L (ref 3–16)
BUN BLDV-MCNC: 36 MG/DL (ref 7–20)
CALCIUM SERPL-MCNC: 9.9 MG/DL (ref 8.3–10.6)
CHLORIDE BLD-SCNC: 91 MMOL/L (ref 99–110)
CO2: 32 MMOL/L (ref 21–32)
CREAT SERPL-MCNC: 1.4 MG/DL (ref 0.6–1.1)
GFR AFRICAN AMERICAN: 48
GFR NON-AFRICAN AMERICAN: 40
GLUCOSE BLD-MCNC: 374 MG/DL (ref 70–99)
POTASSIUM SERPL-SCNC: 4.8 MMOL/L (ref 3.5–5.1)
PRO-BNP: 132 PG/ML (ref 0–124)
SODIUM BLD-SCNC: 134 MMOL/L (ref 136–145)

## 2020-06-15 PROCEDURE — 36415 COLL VENOUS BLD VENIPUNCTURE: CPT

## 2020-06-15 PROCEDURE — 80048 BASIC METABOLIC PNL TOTAL CA: CPT

## 2020-06-15 PROCEDURE — 83880 ASSAY OF NATRIURETIC PEPTIDE: CPT

## 2020-06-18 ENCOUNTER — TELEPHONE (OUTPATIENT)
Dept: CARDIOLOGY CLINIC | Age: 50
End: 2020-06-18

## 2020-06-18 NOTE — TELEPHONE ENCOUNTER
----- Message from Lyndon Jacobs MD sent at 6/15/2020  7:54 PM EDT -----  Call patient. Her labs look okay. No changes.   ARETHA

## 2020-06-18 NOTE — TELEPHONE ENCOUNTER
I spoke with pt  and relayed message per ARETHA. He states that the last 3 days pt has been having severe CP. She takes the Nitro and it alleviates the s/s. I advised that the instructions say that if david ndoes not go away after 3 pills you should go to the ER. He states that she refuses to go. She has an appointment on 6/22/20.

## 2020-06-22 ENCOUNTER — OFFICE VISIT (OUTPATIENT)
Dept: CARDIOLOGY CLINIC | Age: 50
End: 2020-06-22
Payer: COMMERCIAL

## 2020-06-22 VITALS
SYSTOLIC BLOOD PRESSURE: 104 MMHG | HEART RATE: 84 BPM | OXYGEN SATURATION: 91 % | DIASTOLIC BLOOD PRESSURE: 62 MMHG | WEIGHT: 186 LBS | BODY MASS INDEX: 31.93 KG/M2

## 2020-06-22 PROCEDURE — 1036F TOBACCO NON-USER: CPT | Performed by: INTERNAL MEDICINE

## 2020-06-22 PROCEDURE — G9899 SCRN MAM PERF RSLTS DOC: HCPCS | Performed by: INTERNAL MEDICINE

## 2020-06-22 PROCEDURE — G8417 CALC BMI ABV UP PARAM F/U: HCPCS | Performed by: INTERNAL MEDICINE

## 2020-06-22 PROCEDURE — G8427 DOCREV CUR MEDS BY ELIG CLIN: HCPCS | Performed by: INTERNAL MEDICINE

## 2020-06-22 PROCEDURE — 99215 OFFICE O/P EST HI 40 MIN: CPT | Performed by: INTERNAL MEDICINE

## 2020-06-22 PROCEDURE — 3017F COLORECTAL CA SCREEN DOC REV: CPT | Performed by: INTERNAL MEDICINE

## 2020-06-22 RX ORDER — METOLAZONE 2.5 MG/1
TABLET ORAL
Qty: 60 TABLET | Refills: 0 | Status: SHIPPED | OUTPATIENT
Start: 2020-06-22 | End: 2021-02-03

## 2020-06-22 RX ORDER — INSULIN DEGLUDEC 200 U/ML
INJECTION, SOLUTION SUBCUTANEOUS
Qty: 15 ML | Refills: 1 | Status: SHIPPED | OUTPATIENT
Start: 2020-06-22 | End: 2020-06-22 | Stop reason: SDUPTHER

## 2020-06-22 RX ORDER — BLOOD-GLUCOSE METER
KIT MISCELLANEOUS
Qty: 200 STRIP | Refills: 5 | Status: SHIPPED | OUTPATIENT
Start: 2020-06-22 | End: 2021-02-04 | Stop reason: SDUPTHER

## 2020-06-22 RX ORDER — NITROGLYCERIN 0.4 MG/1
0.4 TABLET SUBLINGUAL EVERY 5 MIN PRN
Qty: 25 TABLET | Refills: 3 | Status: SHIPPED | OUTPATIENT
Start: 2020-06-22 | End: 2021-06-18

## 2020-06-22 RX ORDER — LORATADINE 10 MG/1
TABLET ORAL
Qty: 30 TABLET | Refills: 0 | Status: SHIPPED | OUTPATIENT
Start: 2020-06-22 | End: 2020-07-20

## 2020-06-22 RX ORDER — INSULIN DEGLUDEC 200 U/ML
INJECTION, SOLUTION SUBCUTANEOUS
Qty: 27 ML | Refills: 1 | Status: SHIPPED | OUTPATIENT
Start: 2020-06-22 | End: 2020-08-06 | Stop reason: SDUPTHER

## 2020-06-22 RX ORDER — OMEPRAZOLE 20 MG/1
CAPSULE, DELAYED RELEASE ORAL
Qty: 60 CAPSULE | Refills: 0 | Status: SHIPPED | OUTPATIENT
Start: 2020-06-22 | End: 2020-08-17

## 2020-06-29 LAB
ANION GAP SERPL CALCULATED.3IONS-SCNC: 12 MMOL/L (ref 6–18)
B-TYPE NATRIURETIC PEPTIDE: 36 PG/ML (ref 0–47)
BUN BLDV-MCNC: 45 MG/DL (ref 8–26)
CALCIUM SERPL-MCNC: 9.1 MG/DL (ref 8.5–10.5)
CHLORIDE BLD-SCNC: 97 MEQ/L (ref 101–111)
CO2: 29 MMOL/L (ref 24–36)
CREAT SERPL-MCNC: 1.38 MG/DL (ref 0.44–1.03)
GFR AFRICAN AMERICAN: 50 ML/MIN/1.73 M2
GFR NON-AFRICAN AMERICAN: 44 ML/MIN/1.73 M2
GLUCOSE BLD-MCNC: 202 MG/DL (ref 70–99)
POTASSIUM SERPL-SCNC: 3.9 MEQ/L (ref 3.6–5.1)
SODIUM BLD-SCNC: 138 MEQ/L (ref 135–145)
URIC ACID, SERUM: 3.9 MG/DL (ref 2.6–7.2)

## 2020-07-01 ENCOUNTER — HOSPITAL ENCOUNTER (OUTPATIENT)
Dept: NON INVASIVE DIAGNOSTICS | Age: 50
Discharge: HOME | End: 2020-07-01
Payer: COMMERCIAL

## 2020-07-01 LAB
LV EF: 59 %
LVEF MODALITY: NORMAL

## 2020-07-01 PROCEDURE — 78452 HT MUSCLE IMAGE SPECT MULT: CPT | Performed by: INTERNAL MEDICINE

## 2020-07-01 PROCEDURE — 6360000002 HC RX W HCPCS: Performed by: INTERNAL MEDICINE

## 2020-07-01 PROCEDURE — 3430000000 HC RX DIAGNOSTIC RADIOPHARMACEUTICAL: Performed by: INTERNAL MEDICINE

## 2020-07-01 PROCEDURE — A9502 TC99M TETROFOSMIN: HCPCS | Performed by: INTERNAL MEDICINE

## 2020-07-01 PROCEDURE — 93017 CV STRESS TEST TRACING ONLY: CPT | Performed by: INTERNAL MEDICINE

## 2020-07-01 RX ORDER — AMINOPHYLLINE DIHYDRATE 25 MG/ML
100 INJECTION, SOLUTION INTRAVENOUS ONCE
Status: COMPLETED | OUTPATIENT
Start: 2020-07-01 | End: 2020-07-01

## 2020-07-01 RX ADMIN — TETROFOSMIN 10 MILLICURIE: 1.38 INJECTION, POWDER, LYOPHILIZED, FOR SOLUTION INTRAVENOUS at 13:26

## 2020-07-01 RX ADMIN — REGADENOSON 0.4 MG: 0.08 INJECTION, SOLUTION INTRAVENOUS at 14:31

## 2020-07-01 RX ADMIN — TETROFOSMIN 30 MILLICURIE: 1.38 INJECTION, POWDER, LYOPHILIZED, FOR SOLUTION INTRAVENOUS at 14:30

## 2020-07-01 RX ADMIN — AMINOPHYLLINE DIHYDRATE 100 MG: 25 INJECTION, SOLUTION INTRAVENOUS at 14:44

## 2020-07-02 ENCOUNTER — TELEPHONE (OUTPATIENT)
Dept: CARDIOLOGY CLINIC | Age: 50
End: 2020-07-02

## 2020-07-06 NOTE — TELEPHONE ENCOUNTER
LMOM    Notes recorded by Merlin Harper MD on 7/1/2020 at 5:37 PM EDT  Please call patient's  and let him and her know that her stress test was normal.  No evidence of blockages and heart function is still strong, 59%.  ARETHA    Notes recorded by Merlin Harper MD on 6/30/2020 at 9:42 PM EDT  Call patient.  Labs look good.  No changes.   ARETHA

## 2020-07-08 ENCOUNTER — TELEPHONE (OUTPATIENT)
Dept: ADMINISTRATIVE | Age: 50
End: 2020-07-08

## 2020-07-08 ENCOUNTER — TELEPHONE (OUTPATIENT)
Dept: CARDIOLOGY CLINIC | Age: 50
End: 2020-07-08

## 2020-07-08 NOTE — TELEPHONE ENCOUNTER
I don't see those labs under care everywhere. We will just wait and get labs per our usual standing order. She tends to fluctuate a lot.   ARETHA

## 2020-07-08 NOTE — TELEPHONE ENCOUNTER
[facility] received a call from:    Name of Caller: Nik Gabriel  Relationship to patient:spouse    Organization name: n/a    Best contact number: [phone]    Reason for call: Pts spouse would like a call back to discuss getting a in person appt for a cyst on tongue.  She also needs a referral to see a specialist.

## 2020-07-09 ENCOUNTER — OFFICE VISIT (OUTPATIENT)
Dept: INTERNAL MEDICINE CLINIC | Age: 50
End: 2020-07-09
Payer: COMMERCIAL

## 2020-07-09 VITALS
HEIGHT: 64 IN | BODY MASS INDEX: 31.58 KG/M2 | HEART RATE: 88 BPM | WEIGHT: 185 LBS | SYSTOLIC BLOOD PRESSURE: 128 MMHG | DIASTOLIC BLOOD PRESSURE: 76 MMHG | TEMPERATURE: 99.1 F

## 2020-07-09 PROCEDURE — G9899 SCRN MAM PERF RSLTS DOC: HCPCS | Performed by: NURSE PRACTITIONER

## 2020-07-09 PROCEDURE — 99213 OFFICE O/P EST LOW 20 MIN: CPT | Performed by: NURSE PRACTITIONER

## 2020-07-09 PROCEDURE — 3017F COLORECTAL CA SCREEN DOC REV: CPT | Performed by: NURSE PRACTITIONER

## 2020-07-09 PROCEDURE — G8427 DOCREV CUR MEDS BY ELIG CLIN: HCPCS | Performed by: NURSE PRACTITIONER

## 2020-07-09 PROCEDURE — 1036F TOBACCO NON-USER: CPT | Performed by: NURSE PRACTITIONER

## 2020-07-09 PROCEDURE — G8417 CALC BMI ABV UP PARAM F/U: HCPCS | Performed by: NURSE PRACTITIONER

## 2020-07-09 ASSESSMENT — ENCOUNTER SYMPTOMS
EYE PAIN: 0
WHEEZING: 0
SINUS PAIN: 0
RHINORRHEA: 0
SORE THROAT: 0
TROUBLE SWALLOWING: 0
COUGH: 0
SINUS PRESSURE: 0
SHORTNESS OF BREATH: 0

## 2020-07-09 NOTE — PROGRESS NOTES
Acute Office Visit  7/9/2020    SUBJECTIVE:    Patient ID: Fareed Torres is a 45 y.o. female. Chief Complaint   Patient presents with    Other     painful hole on R side of tongue. HPI: The patient presents to the office for an acute visit. Pt reports a painful wound on the right side of her tongue. Started 4-5 days ago. Painful to eat d/t wound. Even painful to drink water. Nothing makes this better. No fevers/chills. No nasal congestion, sneezing or cough. No other associated symptoms. Pt is an French-speaking patient. A audio interpretor was utilized the entire visit. Allergies   Allergen Reactions    Iv Dye [Iodides] Anaphylaxis     allergic to ct scan dye, not the MRI    Trazodone And Nefazodone Other (See Comments)     Makes patient feel very sluggish     Current Outpatient Medications   Medication Sig Dispense Refill    metOLazone (ZAROXOLYN) 2.5 MG tablet TAKE 1 TABLET BY MOUTH TWO TIMES A WEEK AS NEEDED for weight over 180 pounds. do not take two days in a row 60 tablet 0    omeprazole (PRILOSEC) 20 MG delayed release capsule TAKE 1 CAPSULE BY MOUTH TWO TIMES A DAY  60 capsule 0    loratadine (CLARITIN) 10 MG tablet TAKE 1 TABLET BY MOUTH ONE TIME A DAY  30 tablet 0    blood glucose test strips (FREESTYLE LITE) strip test blood sugar four times daily 200 strip 5    Insulin Degludec (TRESIBA FLEXTOUCH) 200 UNIT/ML SOPN inject 150 units subcutaneously once daily (Patient taking differently: inject 160 units subcutaneously once daily) 27 mL 1    nitroGLYCERIN (NITROSTAT) 0.4 MG SL tablet Place 1 tablet under the tongue every 5 minutes as needed for Chest pain up to max of 3 total doses.  If no relief after 1 dose, call 911. 25 tablet 3    senna-docusate (SM SENNA-S) 8.6-50 MG per tablet TAKE 2 TABLETS BY MOUTH TWO TIMES A  tablet 1    vitamin D3 (CHOLECALCIFEROL) 25 MCG (1000 UT) TABS tablet TAKE 3 TABLETS BY MOUTH ONE TIME A DAY 90 tablet 5    levothyroxine (SYNTHROID) 150 MCG tablet Take 1 tablet by mouth every morning (before breakfast) 90 tablet 3    Ertugliflozin L-PyroglutamicAc (STEGLATRO) 5 MG TABS Take 1 tablet by mouth one time daily. 30 tablet 2    insulin aspart (NOVOLOG FLEXPEN) 100 UNIT/ML injection pen Inject 30-50 Units into the skin 3 times daily (before meals) 10 pen 3    ferrous sulfate (IRON 325) 325 (65 Fe) MG tablet TAKE 1 TABLET BY MOUTH TWO TIMES A DAY WITH MEALS 180 tablet 0    diazePAM (VALIUM) 5 MG tablet Take 5 mg by mouth every 8 hours as needed for Anxiety.  cariprazine hcl (VRAYLAR) 1.5 MG capsule Take 1.5 mg by mouth daily      butalbital-acetaminophen-caffeine (FIORICET, ESGIC) -40 MG per tablet Take 1 tablet by mouth every 6 hours as needed for Headaches 30 tablet 1    Febuxostat 80 MG TABS Take 80 mg by mouth daily      glipiZIDE (GLUCOTROL XL) 10 MG extended release tablet Take 10 mg by mouth 2 times daily      pregabalin (LYRICA) 50 MG capsule Take 50 mg by mouth 2 times daily.       lubiprostone (AMITIZA) 24 MCG capsule Take 24 mcg by mouth daily (with breakfast)      spironolactone (ALDACTONE) 50 MG tablet TAKE 2 TABLETS BY MOUTH in the morning and 1 tablet by mouth once in the evening 90 tablet 3    albuterol (PROVENTIL) (2.5 MG/3ML) 0.083% nebulizer solution Take 3 mLs by nebulization every 6 hours as needed for Wheezing 120 each 3    montelukast (SINGULAIR) 10 MG tablet TAKE 1 TABLET BY MOUTH ONE TIME A DAY 30 tablet 5    tiZANidine (ZANAFLEX) 2 MG tablet Take 1 tablet by mouth every 8 hours as needed (pain and spasm) 30 tablet 3    torsemide (DEMADEX) 100 MG tablet TAKE 1 TABLET BY MOUTH IN THE MORNING AND 1/2 TABLET IN THE EVENING  135 tablet 3    ranolazine (RANEXA) 500 MG extended release tablet TAKE 1 TABLET BY MOUTH TWO TIMES A DAY  180 tablet 3    Glucagon 1 MG/0.2ML SOSY To be used in case of severe hypoglycemia 2 Syringe 2    leflunomide (ARAVA) 10 MG tablet Take 5 mg by mouth daily       metoprolol tartrate (LOPRESSOR) 25 MG tablet Take 1 tablet by mouth 2 times daily 180 tablet 3    simvastatin (ZOCOR) 20 MG tablet TAKE 1 TABLET BY MOUTH nightly 90 tablet 3    colchicine (COLCRYS) 0.6 MG tablet Take 0.6 mg by mouth 2 times daily       albuterol sulfate  (90 Base) MCG/ACT inhaler Inhale 2 puffs into the lungs every 6 hours as needed for Wheezing 1 Inhaler 6    OXYGEN Inhale 3 L into the lungs nightly Sometimes with activity      oxyCODONE (OXYCONTIN) 20 MG extended release tablet Take 20 mg by mouth every 12 hours.  aspirin 81 MG EC tablet Take 81 mg by mouth daily      sulfaSALAzine (AZULFIDINE) 500 MG tablet Take 1,000 mg by mouth 2 times daily       calcium carbonate-vitamin D (CALCIUM 600+D) 600-200 MG-UNIT TABS Take 1 tablet by mouth 2 times daily      benztropine (COGENTIN) 1 MG tablet Take 1 mg by mouth nightly       folic acid (FOLVITE) 1 MG tablet Take 1 mg by mouth daily      oxyCODONE-acetaminophen (PERCOCET)  MG per tablet Take 1 tablet by mouth every 4 hours as needed for Pain . Earliest Fill Date: 6/8/17 100 tablet 0    duloxetine (CYMBALTA) 60 MG capsule Take 60 mg by mouth 2 times daily. No current facility-administered medications for this visit. Review of Systems   Constitutional: Negative for appetite change, chills, diaphoresis, fatigue, fever and unexpected weight change. HENT: Positive for mouth sores. Negative for congestion, drooling, ear discharge, ear pain, hearing loss, postnasal drip, rhinorrhea, sinus pressure, sinus pain, sneezing, sore throat and trouble swallowing. Eyes: Negative for pain and visual disturbance. Respiratory: Negative for cough, shortness of breath and wheezing. Cardiovascular: Negative for chest pain, palpitations and leg swelling. Skin: Negative for pallor and rash. Neurological: Negative for dizziness, weakness, light-headedness, numbness and headaches.      OBJECTIVE:  /76   Pulse 88   Temp 99.1 °F (37.3 °C) (Oral)   Ht 5' 4\" (1.626 m)   Wt 185 lb (83.9 kg)   BMI 31.76 kg/m²    Physical Exam  Vitals signs reviewed. Constitutional:       General: She is not in acute distress. Appearance: She is well-developed. She is not diaphoretic. HENT:      Head: Normocephalic and atraumatic. Right Ear: Hearing normal.      Left Ear: Hearing normal.      Nose: Nose normal.      Mouth/Throat:      Mouth: Mucous membranes are moist.      Pharynx: No oropharyngeal exudate. Comments: 1 cm x 1 cm ulceration noted to the right side of the tongue  Eyes:      Conjunctiva/sclera: Conjunctivae normal.      Pupils: Pupils are equal, round, and reactive to light. Neck:      Thyroid: No thyromegaly. Cardiovascular:      Rate and Rhythm: Normal rate and regular rhythm. Pulmonary:      Effort: Pulmonary effort is normal. No respiratory distress. Breath sounds: Normal breath sounds. No stridor. No wheezing. Skin:     General: Skin is warm and dry. Neurological:      Mental Status: She is alert and oriented to person, place, and time. ASSESSMENT/PLAN:  Damaris Miller was seen today for other. Diagnoses and all orders for this visit:    Oral ulceration   - No SOB/trouble swallowing/drooling. Afebrile. Appears to be a canker sore. Recommended orajel. Pt education handout on canker sores provided and reviewed with the pt. Symptomatic management reviewed. - All questions answered. Patient states no further questions or concerns at this time. - Pt will call if symptoms worsen or fail to improve. - Red flag warning signs reviewed with the patient and she will go to the ER if these occur    Return for as previously scheduled or sooner if needed.     Electronically signed by HERMES Gant CNP 07/09/20

## 2020-07-09 NOTE — PATIENT INSTRUCTIONS
spicy and salty foods, citrus fruits, nuts, seeds, and tomatoes. · To soothe your canker sore and help it heal:  ? Use an over-the-counter numbing medicine, such as Orabase or Anbesol. ? Dab a bit of Milk of Magnesia on the canker sore 3 or 4 times a day. · Put ice on your sore to reduce the pain. · Take anti-inflammatory medicines to reduce pain, as needed. These include ibuprofen (Advil, Motrin) and naproxen (Aleve). Read and follow all instructions on the label. · Use a soft-bristle toothbrush, and brush your teeth well but carefully. · Do not smoke or use spit tobacco. Tobacco can cause mouth problems and slow healing. If you need help quitting, talk to your doctor about stop-smoking programs and medicines. These can increase your chances of quitting for good. When should you call for help? Call your doctor now or seek immediate medical care if:  · You have signs of infection, such as:  ? Increased pain, swelling, warmth, or redness. ? Red streaks leading from the area. ? Pus draining from the area. ? A fever. Watch closely for changes in your health, and be sure to contact your doctor if:  · You do not get better as expected. Where can you learn more? Go to [email or web address]. org and sign in to your [employer] account. Enter H267 in the [facility] box to learn more about \"Canker Sore: Care Instructions. \"     If you do not have an account, please click on the \"Sign Up Now\" link. Current as of: March 25, 2020               Content Version: 12.5  © 6724-1397 Healthwise, Incorporated. Care instructions adapted under license by [facility] ([facility]). If you have questions about a medical condition or this instruction, always ask your healthcare professional. [address]  any warranty or liability for your use of this information.

## 2020-07-13 ENCOUNTER — HOSPITAL ENCOUNTER (OUTPATIENT)
Age: 50
Setting detail: SPECIMEN
Discharge: HOME | End: 2020-07-13
Payer: COMMERCIAL

## 2020-07-13 LAB
ANION GAP SERPL CALCULATED.3IONS-SCNC: 13 MMOL/L (ref 3–16)
BUN BLDV-MCNC: 42 MG/DL (ref 7–20)
CALCIUM SERPL-MCNC: 10.1 MG/DL (ref 8.3–10.6)
CHLORIDE BLD-SCNC: 90 MMOL/L (ref 99–110)
CO2: 33 MMOL/L (ref 21–32)
CREAT SERPL-MCNC: 1.4 MG/DL (ref 0.6–1.1)
GFR AFRICAN AMERICAN: 48
GFR NON-AFRICAN AMERICAN: 40
GLUCOSE BLD-MCNC: 239 MG/DL (ref 70–99)
POTASSIUM SERPL-SCNC: 4.3 MMOL/L (ref 3.5–5.1)
PRO-BNP: 209 PG/ML (ref 0–124)
SODIUM BLD-SCNC: 136 MMOL/L (ref 136–145)

## 2020-07-13 PROCEDURE — 83880 ASSAY OF NATRIURETIC PEPTIDE: CPT

## 2020-07-13 PROCEDURE — 36415 COLL VENOUS BLD VENIPUNCTURE: CPT

## 2020-07-13 PROCEDURE — 80048 BASIC METABOLIC PNL TOTAL CA: CPT

## 2020-07-20 RX ORDER — LORATADINE 10 MG/1
TABLET ORAL
Qty: 30 TABLET | Refills: 0 | Status: SHIPPED | OUTPATIENT
Start: 2020-07-20 | End: 2020-08-17

## 2020-07-21 ENCOUNTER — TELEPHONE (OUTPATIENT)
Dept: CARDIOLOGY CLINIC | Age: 50
End: 2020-07-21

## 2020-07-21 NOTE — TELEPHONE ENCOUNTER
----- Message from HERMES Tapia CNP sent at 7/16/2020 12:49 PM EDT -----  Fluid number is trending up but kidney function is stable. She should take an extra dose of metolazone please.  Benny Rodrigues

## 2020-07-23 ENCOUNTER — TELEPHONE (OUTPATIENT)
Dept: CARDIOLOGY CLINIC | Age: 50
End: 2020-07-23

## 2020-07-27 ENCOUNTER — TELEPHONE (OUTPATIENT)
Dept: INTERNAL MEDICINE CLINIC | Age: 50
End: 2020-07-27

## 2020-07-27 ENCOUNTER — HOSPITAL ENCOUNTER (OUTPATIENT)
Age: 50
Setting detail: SPECIMEN
Discharge: HOME | End: 2020-07-27
Payer: COMMERCIAL

## 2020-07-27 LAB
ANION GAP SERPL CALCULATED.3IONS-SCNC: 12 MMOL/L (ref 3–16)
BUN BLDV-MCNC: 47 MG/DL (ref 7–20)
CALCIUM SERPL-MCNC: 9.6 MG/DL (ref 8.3–10.6)
CHLORIDE BLD-SCNC: 92 MMOL/L (ref 99–110)
CO2: 32 MMOL/L (ref 21–32)
CREAT SERPL-MCNC: 1.2 MG/DL (ref 0.6–1.1)
GFR AFRICAN AMERICAN: 57
GFR NON-AFRICAN AMERICAN: 47
GLUCOSE BLD-MCNC: 117 MG/DL (ref 70–99)
POTASSIUM SERPL-SCNC: 4.1 MMOL/L (ref 3.5–5.1)
PRO-BNP: 146 PG/ML (ref 0–124)
SODIUM BLD-SCNC: 136 MMOL/L (ref 136–145)

## 2020-07-27 PROCEDURE — 36415 COLL VENOUS BLD VENIPUNCTURE: CPT

## 2020-07-27 PROCEDURE — 80048 BASIC METABOLIC PNL TOTAL CA: CPT

## 2020-07-27 PROCEDURE — 83880 ASSAY OF NATRIURETIC PEPTIDE: CPT

## 2020-07-27 NOTE — TELEPHONE ENCOUNTER
Laina Samuels, Manager Clinical Services with [facility] is calling to ask if Dr Jerry Rubio will sign home care nursing orders for patient. She states they have been seeing patient since 7/12/20. The are seeing patient for medication and disease management and for BNP & BMP every other week.

## 2020-07-27 NOTE — TELEPHONE ENCOUNTER
Pt spoke with Colt Cervantes and she was notified Dr Lalito Erazo would sign. (3) walks occasionally

## 2020-08-03 ENCOUNTER — TELEPHONE (OUTPATIENT)
Dept: CARDIOLOGY CLINIC | Age: 50
End: 2020-08-03

## 2020-08-03 NOTE — TELEPHONE ENCOUNTER
----- Message from Javier Ventura MD sent at 8/2/2020 11:14 PM EDT -----  Please call patient. Her labs look good. No changes.   Javier Ventura

## 2020-08-06 RX ORDER — INSULIN DEGLUDEC 200 U/ML
INJECTION, SOLUTION SUBCUTANEOUS
Qty: 16 PEN | Refills: 3 | Status: SHIPPED | OUTPATIENT
Start: 2020-08-06 | End: 2021-02-04 | Stop reason: SDUPTHER

## 2020-08-10 ENCOUNTER — VIRTUAL VISIT (OUTPATIENT)
Dept: INTERNAL MEDICINE CLINIC | Age: 50
End: 2020-08-10
Payer: COMMERCIAL

## 2020-08-10 ENCOUNTER — HOSPITAL ENCOUNTER (OUTPATIENT)
Age: 50
Setting detail: SPECIMEN
Discharge: HOME | End: 2020-08-10
Payer: COMMERCIAL

## 2020-08-10 LAB
ANION GAP SERPL CALCULATED.3IONS-SCNC: 13 MMOL/L (ref 3–16)
BUN BLDV-MCNC: 35 MG/DL (ref 7–20)
CALCIUM SERPL-MCNC: 9.7 MG/DL (ref 8.3–10.6)
CHLORIDE BLD-SCNC: 89 MMOL/L (ref 99–110)
CO2: 29 MMOL/L (ref 21–32)
CREAT SERPL-MCNC: 1.6 MG/DL (ref 0.6–1.1)
GFR AFRICAN AMERICAN: 41
GFR NON-AFRICAN AMERICAN: 34
GLUCOSE BLD-MCNC: 145 MG/DL (ref 70–99)
POTASSIUM SERPL-SCNC: 4.3 MMOL/L (ref 3.5–5.1)
PRO-BNP: 149 PG/ML (ref 0–124)
SODIUM BLD-SCNC: 131 MMOL/L (ref 136–145)
URIC ACID, SERUM: 4.9 MG/DL (ref 2.6–6)

## 2020-08-10 PROCEDURE — G8427 DOCREV CUR MEDS BY ELIG CLIN: HCPCS | Performed by: INTERNAL MEDICINE

## 2020-08-10 PROCEDURE — 1036F TOBACCO NON-USER: CPT | Performed by: INTERNAL MEDICINE

## 2020-08-10 PROCEDURE — G8417 CALC BMI ABV UP PARAM F/U: HCPCS | Performed by: INTERNAL MEDICINE

## 2020-08-10 PROCEDURE — 36415 COLL VENOUS BLD VENIPUNCTURE: CPT

## 2020-08-10 PROCEDURE — 80048 BASIC METABOLIC PNL TOTAL CA: CPT

## 2020-08-10 PROCEDURE — 99213 OFFICE O/P EST LOW 20 MIN: CPT | Performed by: INTERNAL MEDICINE

## 2020-08-10 PROCEDURE — 84550 ASSAY OF BLOOD/URIC ACID: CPT

## 2020-08-10 PROCEDURE — 83880 ASSAY OF NATRIURETIC PEPTIDE: CPT

## 2020-08-10 PROCEDURE — 3017F COLORECTAL CA SCREEN DOC REV: CPT | Performed by: INTERNAL MEDICINE

## 2020-08-10 PROCEDURE — G9899 SCRN MAM PERF RSLTS DOC: HCPCS | Performed by: INTERNAL MEDICINE

## 2020-08-10 RX ORDER — KETOCONAZOLE 20 MG/ML
SHAMPOO TOPICAL
Qty: 120 ML | Refills: 1 | Status: SHIPPED | OUTPATIENT
Start: 2020-08-10

## 2020-08-10 RX ORDER — TRIAMCINOLONE ACETONIDE 0.1 %
PASTE (GRAM) DENTAL
Qty: 5 G | Refills: 1 | Status: SHIPPED | OUTPATIENT
Start: 2020-08-10 | End: 2020-08-17

## 2020-08-10 RX ORDER — LIDOCAINE HYDROCHLORIDE 20 MG/ML
5 SOLUTION OROPHARYNGEAL
Qty: 100 ML | Refills: 2 | Status: SHIPPED | OUTPATIENT
Start: 2020-08-10 | End: 2022-04-20 | Stop reason: ALTCHOICE

## 2020-08-10 NOTE — PROGRESS NOTES
TELEHEALTH EVALUATION -- Audio/Visual (During JLSHG-08 public health emergency)    HPI    Seen today via video visit. States that she is ok, just very tired. Also c/o scalp pain that is constant. Pain is over her frontal part of her head. Is also c/o losing her hair. Having flaking skin on her scalp.  tried an OTC medication that has not helped. Is also c/o tongue pain and burning. Has several ulcers on her tongue that are painful.       Past Medical History:   Diagnosis Date    Anxiety     Anxiety and depression     Arthritis     not sure of specific type    Asthma     CAD (coronary artery disease)     Cancer ([facility] 2007    left breast    Cerebral artery occlusion with cerebral infarction ([facility]     CHF (congestive heart failure) ([facility] 2018    Chronic kidney disease     renal insufficency/to see Dr Padmini Lowery    Chronic pain     Constipation     COPD (chronic obstructive pulmonary disease) ([facility]     Depression     Diabetes mellitus ([facility]     Diabetic polyneuropathy associated with type 2 diabetes mellitus ([facility] 2/2/2018    Dysthymia 2/2/2018    ESBL (extended spectrum beta-lactamase) producing bacteria infection 03/14/2018    urine    Fibromyalgia     Gastric ulcer, unspecified as acute or chronic, without mention of hemorrhage, perforation, or obstruction     GERD (gastroesophageal reflux disease)     Gout     HIGH CHOLESTEROL     Hypertension     Hypothyroidism     Severe persistent asthma without complication 2/0/3657    Thyroid disease     TIA (transient ischemic attack)     with occasional left leg and hand weakness       Past Surgical History:   Procedure Laterality Date    BREAST SURGERY      left mastectomy    CARPAL TUNNEL RELEASE      Bilateral    FINGER CONTRACTURE SURGERY      HYSTERECTOMY  2005    USO    TONSILLECTOMY      UPPER GASTROINTESTINAL ENDOSCOPY  2019    stretched esophagous        Family History   Problem Relation Age of Onset    Asthma Other     Cancer Other     Depression Other     Diabetes Other     Hypertension Other     High Cholesterol Other     Migraines Other     Heart Attack Father 72         of MI    High Blood Pressure Mother     Diabetes Mother        Allergies   Allergen Reactions    Iv Dye [Iodides] Anaphylaxis     allergic to ct scan dye, not the MRI    Trazodone And Nefazodone Other (See Comments)     Makes patient feel very sluggish       Current Outpatient Medications   Medication Sig Dispense Refill    triamcinolone acetonide (KENALOG) 0.1 % paste Apply to aphthous ulcers on tongue BID until healed 5 g 1    lidocaine viscous hcl (XYLOCAINE) 2 % SOLN solution Take 5 mLs by mouth every 3 hours as needed for Irritation or Pain 100 mL 2    ketoconazole (NIZORAL) 2 % shampoo Apply topically daily as needed. 120 mL 1    Insulin Degludec (TRESIBA FLEXTOUCH) 200 UNIT/ML SOPN inject 160 units subcutaneously once daily 16 pen 3    NOVOLOG FLEXPEN 100 UNIT/ML injection pen INJECT 30 TO 50 UNITS INTO THE SKIN THREE TIMES DAILY BEFORE MEALS 30 mL 3    loratadine (CLARITIN) 10 MG tablet TAKE 1 TABLET BY MOUTH ONE TIME A DAY  30 tablet 0    metOLazone (ZAROXOLYN) 2.5 MG tablet TAKE 1 TABLET BY MOUTH TWO TIMES A WEEK AS NEEDED for weight over 180 pounds. do not take two days in a row 60 tablet 0    omeprazole (PRILOSEC) 20 MG delayed release capsule TAKE 1 CAPSULE BY MOUTH TWO TIMES A DAY  60 capsule 0    blood glucose test strips (FREESTYLE LITE) strip test blood sugar four times daily 200 strip 5    nitroGLYCERIN (NITROSTAT) 0.4 MG SL tablet Place 1 tablet under the tongue every 5 minutes as needed for Chest pain up to max of 3 total doses.  If no relief after 1 dose, call 911. 25 tablet 3    senna-docusate (SM SENNA-S) 8.6-50 MG per tablet TAKE 2 TABLETS BY MOUTH TWO TIMES A  tablet 1    vitamin D3 (CHOLECALCIFEROL) 25 MCG (1000 UT) TABS tablet TAKE 3 TABLETS BY MOUTH ONE TIME A DAY 90 tablet 5    levothyroxine (SYNTHROID) 150 MCG tablet Take 1 tablet by mouth every morning (before breakfast) 90 tablet 3    Ertugliflozin L-PyroglutamicAc (STEGLATRO) 5 MG TABS Take 1 tablet by mouth one time daily. 30 tablet 2    ferrous sulfate (IRON 325) 325 (65 Fe) MG tablet TAKE 1 TABLET BY MOUTH TWO TIMES A DAY WITH MEALS 180 tablet 0    diazePAM (VALIUM) 5 MG tablet Take 5 mg by mouth every 8 hours as needed for Anxiety.  cariprazine hcl (VRAYLAR) 1.5 MG capsule Take 1.5 mg by mouth daily      butalbital-acetaminophen-caffeine (FIORICET, ESGIC) -40 MG per tablet Take 1 tablet by mouth every 6 hours as needed for Headaches 30 tablet 1    Febuxostat 80 MG TABS Take 80 mg by mouth daily      glipiZIDE (GLUCOTROL XL) 10 MG extended release tablet Take 10 mg by mouth 2 times daily      pregabalin (LYRICA) 50 MG capsule Take 50 mg by mouth 2 times daily.       lubiprostone (AMITIZA) 24 MCG capsule Take 24 mcg by mouth daily (with breakfast)      spironolactone (ALDACTONE) 50 MG tablet TAKE 2 TABLETS BY MOUTH in the morning and 1 tablet by mouth once in the evening 90 tablet 3    albuterol (PROVENTIL) (2.5 MG/3ML) 0.083% nebulizer solution Take 3 mLs by nebulization every 6 hours as needed for Wheezing 120 each 3    montelukast (SINGULAIR) 10 MG tablet TAKE 1 TABLET BY MOUTH ONE TIME A DAY 30 tablet 5    tiZANidine (ZANAFLEX) 2 MG tablet Take 1 tablet by mouth every 8 hours as needed (pain and spasm) 30 tablet 3    torsemide (DEMADEX) 100 MG tablet TAKE 1 TABLET BY MOUTH IN THE MORNING AND 1/2 TABLET IN THE EVENING  135 tablet 3    ranolazine (RANEXA) 500 MG extended release tablet TAKE 1 TABLET BY MOUTH TWO TIMES A DAY  180 tablet 3    Glucagon 1 MG/0.2ML SOSY To be used in case of severe hypoglycemia 2 Syringe 2    leflunomide (ARAVA) 10 MG tablet Take 5 mg by mouth daily       metoprolol tartrate (LOPRESSOR) 25 MG tablet Take 1 tablet by mouth 2 times daily 180 tablet 3    simvastatin (ZOCOR) 20 MG tablet TAKE 1 TABLET BY MOUTH nightly 90 tablet 3    colchicine (COLCRYS) 0.6 MG tablet Take 0.6 mg by mouth 2 times daily       albuterol sulfate  (90 Base) MCG/ACT inhaler Inhale 2 puffs into the lungs every 6 hours as needed for Wheezing 1 Inhaler 6    OXYGEN Inhale 3 L into the lungs nightly Sometimes with activity      oxyCODONE (OXYCONTIN) 20 MG extended release tablet Take 20 mg by mouth every 12 hours.  aspirin 81 MG EC tablet Take 81 mg by mouth daily      sulfaSALAzine (AZULFIDINE) 500 MG tablet Take 1,000 mg by mouth 2 times daily       calcium carbonate-vitamin D (CALCIUM 600+D) 600-200 MG-UNIT TABS Take 1 tablet by mouth 2 times daily      benztropine (COGENTIN) 1 MG tablet Take 1 mg by mouth nightly       folic acid (FOLVITE) 1 MG tablet Take 1 mg by mouth daily      oxyCODONE-acetaminophen (PERCOCET)  MG per tablet Take 1 tablet by mouth every 4 hours as needed for Pain . Earliest Fill Date: 6/8/17 100 tablet 0    duloxetine (CYMBALTA) 60 MG capsule Take 60 mg by mouth 2 times daily. No current facility-administered medications for this visit.         Review of Systems -   Positive for chronic fatigue; negative for fevers; negative for weight change; negative for appetite change  Negative for sore throat, negative for difficulty swallowing  Negative for cough; negative for worsening shortness of breath  Negative for chest pain  Negative for dysuria  Negative for rash      PHYSICAL EXAMINATION:  [ INSTRUCTIONS:  \"[x]\" Indicates a positive item  \"[]\" Indicates a negative item  -- DELETE ALL ITEMS NOT EXAMINED]  Vital Signs: (As obtained by patient/caregiver or practitioner observation)    Blood pressure-  Heart rate-    Respiratory rate-    Temperature-  Pulse oximetry-     Constitutional:  [x] Appears well-developed and well-nourished [x] No apparent distress     [x] Abnormal- chronically ill appearing    Mental status  [x] Alert and awake  [x] Oriented to person/place/time []Able to follow commands      Eyes:  EOM     [x]  Normal  [] Abnormal-  Sclera   [x]  Normal  [] Abnormal -         Discharge  [x]  None visible  [] Abnormal -    HENT:   [x] Normocephalic, atraumatic. [x] Abnormal several aphthous ulcers on tongue. [x] Mouth/Throat: Mucous membranes are moist.  [x] Normal hearing    External Ears [x] Normal  [] Abnormal-     Neck: [x] No visualized mass     Pulmonary/Chest:  [x] Respiratory effort normal.  [x] No visualized signs of difficulty breathing or respiratory distress         [] Abnormal-      Musculoskeletal: [x] Normal gait with no signs of ataxia          [x] Normal range of motion of neck         [] Abnormal-     Neurological:         [x] No Facial Asymmetry (Cranial nerve 7 motor function) (limited exam to video visit)           [x] No gaze palsy         [] Abnormal-         Skin:  [x] No significant exanthematous lesions or discoloration noted on facial skin          [x] Abnormal- flaky skin on scalp. Thinning of hair on scalp. Psychiatric:  [x] Normal Affect [x] Depressed Mood        [] Abnormal-     Other pertinent observable physical exam findings-     Due to this being a TeleHealth encounter, evaluation of the following organ systems is limited: Vitals/Constitutional/EENT/Resp/CV/GI//MS/Neuro/Skin/Heme-Lymph-Imm. Assessment and Plan  Alopecia  ? Related to seborrhea. We will also check iron studies and B12 level looking for underlying cause. May need to see dermatology. Aphthous ulcer of tongue  Patient did discuss with her rheumatologist to deferred her to discuss with me; therefore autoimmune causes seem unlikely. Treat symptomatically with triamcinolone oral paste and viscous lidocaine mouthwash. Seborrhea capitis in adult  Treat with Nizoral shampoo. If no improvement, will add a scalp steroid as well and consider referral to dermatology.         Pursuant to the emergency declaration under the [address] and the [employer] Act, [facility] waiver authority and the Coronavirus Preparedness and [employer] Act, this Virtual  Visit was conducted, with patient's consent, to reduce the patient's risk of exposure to COVID-19 and provide continuity of care for an established patient. Services were provided through a video synchronous discussion virtually to substitute for in-person clinic visit.

## 2020-08-13 ENCOUNTER — TELEPHONE (OUTPATIENT)
Dept: CARDIOLOGY CLINIC | Age: 50
End: 2020-08-13

## 2020-08-13 NOTE — TELEPHONE ENCOUNTER
----- Message from Karla Singh, HERMES - CNS sent at 8/12/2020  1:52 PM EDT -----  Covering labs as Dr Raf Mott is out of the office  Continue all current medications  thanks

## 2020-08-16 PROBLEM — K12.0 APHTHOUS ULCER OF TONGUE: Status: ACTIVE | Noted: 2020-08-16

## 2020-08-16 PROBLEM — L21.0 SEBORRHEA CAPITIS IN ADULT: Status: ACTIVE | Noted: 2020-08-16

## 2020-08-16 PROBLEM — L65.9 ALOPECIA: Status: ACTIVE | Noted: 2020-08-16

## 2020-08-16 NOTE — ASSESSMENT & PLAN NOTE
Patient did discuss with her rheumatologist to deferred her to discuss with me; therefore autoimmune causes seem unlikely. Treat symptomatically with triamcinolone oral paste and viscous lidocaine mouthwash.

## 2020-08-16 NOTE — ASSESSMENT & PLAN NOTE
?  Related to seborrhea. We will also check iron studies and B12 level looking for underlying cause. May need to see dermatology.

## 2020-08-16 NOTE — ASSESSMENT & PLAN NOTE
Treat with Nizoral shampoo. If no improvement, will add a scalp steroid as well and consider referral to dermatology.

## 2020-08-17 RX ORDER — LORATADINE 10 MG/1
TABLET ORAL
Qty: 30 TABLET | Refills: 0 | Status: SHIPPED | OUTPATIENT
Start: 2020-08-17 | End: 2020-10-05

## 2020-08-17 RX ORDER — OMEPRAZOLE 20 MG/1
CAPSULE, DELAYED RELEASE ORAL
Qty: 60 CAPSULE | Refills: 0 | Status: SHIPPED | OUTPATIENT
Start: 2020-08-17 | End: 2020-09-14

## 2020-08-17 RX ORDER — MONTELUKAST SODIUM 10 MG/1
TABLET ORAL
Qty: 30 TABLET | Refills: 3 | Status: SHIPPED | OUTPATIENT
Start: 2020-08-17 | End: 2021-01-04

## 2020-08-17 RX ORDER — SPIRONOLACTONE 50 MG/1
TABLET, FILM COATED ORAL
Qty: 90 TABLET | Refills: 0 | Status: SHIPPED | OUTPATIENT
Start: 2020-08-17 | End: 2020-10-05

## 2020-08-17 RX ORDER — AMOXICILLIN 250 MG
CAPSULE ORAL
Qty: 120 TABLET | Refills: 0 | Status: SHIPPED | OUTPATIENT
Start: 2020-08-17 | End: 2020-09-14

## 2020-08-17 RX ORDER — FERROUS SULFATE 325(65) MG
TABLET ORAL
Qty: 180 TABLET | Refills: 0 | Status: SHIPPED | OUTPATIENT
Start: 2020-08-17 | End: 2020-11-04

## 2020-08-17 NOTE — TELEPHONE ENCOUNTER
Prescription refill    Last OV:06/22/20    Last Refill:03/20/20    Labs:08/10/20    Future Appt:10/07/20

## 2020-08-18 ENCOUNTER — TELEPHONE (OUTPATIENT)
Dept: INTERNAL MEDICINE CLINIC | Age: 50
End: 2020-08-18

## 2020-08-18 NOTE — TELEPHONE ENCOUNTER
She is states pt is 96 on 2 liters of oxygen. Pt is falling asleep in the middle of the visit with her. Room air is 94. She states her Psychologist put her on Valium 5  Mg twice a day.      Creatine 1.2  [id number]  Glucose 117

## 2020-08-18 NOTE — TELEPHONE ENCOUNTER
Spoke with Bang Garcia she is going to speak with the  and pt and let them know to go to the ER. Pt went to the bathroom with no oxygen and O2 dropped to 85 percent with Tita checked.

## 2020-08-18 NOTE — TELEPHONE ENCOUNTER
She may need to go to ED for evaluation as it sounds like she is hypersomulent and I worry about CO2 narcosis.

## 2020-08-19 ENCOUNTER — TELEPHONE (OUTPATIENT)
Dept: CARDIOLOGY CLINIC | Age: 50
End: 2020-08-19

## 2020-08-19 ENCOUNTER — TELEPHONE (OUTPATIENT)
Dept: INTERNAL MEDICINE CLINIC | Age: 50
End: 2020-08-19

## 2020-08-19 NOTE — TELEPHONE ENCOUNTER
JASWANTN calling to ask ARETHA if she wants a BNP and BMP drawn tomorrow when she goes out to see this patient ? Her oxygen was down in the 80\" yesterday and patient was told to go to the ER by her pcp but she refused . She wont go to ER today either .  Please call about labs

## 2020-08-20 ENCOUNTER — HOSPITAL ENCOUNTER (OUTPATIENT)
Age: 50
Setting detail: SPECIMEN
Discharge: HOME | End: 2020-08-20
Payer: COMMERCIAL

## 2020-08-20 LAB
ANION GAP SERPL CALCULATED.3IONS-SCNC: 11 MMOL/L (ref 3–16)
BUN BLDV-MCNC: 28 MG/DL (ref 7–20)
CALCIUM SERPL-MCNC: 9.3 MG/DL (ref 8.3–10.6)
CHLORIDE BLD-SCNC: 92 MMOL/L (ref 99–110)
CO2: 30 MMOL/L (ref 21–32)
CREAT SERPL-MCNC: 1.6 MG/DL (ref 0.6–1.1)
GFR AFRICAN AMERICAN: 41
GFR NON-AFRICAN AMERICAN: 34
GLUCOSE BLD-MCNC: 267 MG/DL (ref 70–99)
POTASSIUM SERPL-SCNC: 4 MMOL/L (ref 3.5–5.1)
PRO-BNP: 275 PG/ML (ref 0–124)
SODIUM BLD-SCNC: 133 MMOL/L (ref 136–145)

## 2020-08-20 PROCEDURE — 80048 BASIC METABOLIC PNL TOTAL CA: CPT

## 2020-08-20 PROCEDURE — 36415 COLL VENOUS BLD VENIPUNCTURE: CPT

## 2020-08-20 PROCEDURE — 83880 ASSAY OF NATRIURETIC PEPTIDE: CPT

## 2020-08-21 ENCOUNTER — TELEPHONE (OUTPATIENT)
Dept: CARDIOLOGY CLINIC | Age: 50
End: 2020-08-21

## 2020-08-21 NOTE — TELEPHONE ENCOUNTER
----- Message from Sofiya Dobbs MD sent at 8/20/2020 10:53 PM EDT -----  Call patient, labs look good. No changes.   ARETHA

## 2020-08-24 ENCOUNTER — HOSPITAL ENCOUNTER (OUTPATIENT)
Age: 50
Setting detail: SPECIMEN
Discharge: HOME | End: 2020-08-24
Payer: COMMERCIAL

## 2020-08-24 LAB
ANION GAP SERPL CALCULATED.3IONS-SCNC: 11 MMOL/L (ref 3–16)
BUN BLDV-MCNC: 37 MG/DL (ref 7–20)
CALCIUM SERPL-MCNC: 9.7 MG/DL (ref 8.3–10.6)
CHLORIDE BLD-SCNC: 96 MMOL/L (ref 99–110)
CO2: 29 MMOL/L (ref 21–32)
CREAT SERPL-MCNC: 1.4 MG/DL (ref 0.6–1.1)
GFR AFRICAN AMERICAN: 48
GFR NON-AFRICAN AMERICAN: 40
GLUCOSE BLD-MCNC: 149 MG/DL (ref 70–99)
POTASSIUM SERPL-SCNC: 4.4 MMOL/L (ref 3.5–5.1)
PRO-BNP: 117 PG/ML (ref 0–124)
SODIUM BLD-SCNC: 136 MMOL/L (ref 136–145)

## 2020-08-24 PROCEDURE — 83880 ASSAY OF NATRIURETIC PEPTIDE: CPT

## 2020-08-24 PROCEDURE — 80048 BASIC METABOLIC PNL TOTAL CA: CPT

## 2020-08-24 PROCEDURE — 36415 COLL VENOUS BLD VENIPUNCTURE: CPT

## 2020-09-01 ENCOUNTER — HOSPITAL ENCOUNTER (OUTPATIENT)
Dept: ULTRASOUND IMAGING | Age: 50
Discharge: HOME | End: 2020-09-01
Payer: COMMERCIAL

## 2020-09-01 ENCOUNTER — HOSPITAL ENCOUNTER (OUTPATIENT)
Dept: WOMENS IMAGING | Age: 50
Discharge: HOME | End: 2020-09-01
Payer: COMMERCIAL

## 2020-09-01 PROCEDURE — 76642 ULTRASOUND BREAST LIMITED: CPT

## 2020-09-01 PROCEDURE — G0279 TOMOSYNTHESIS, MAMMO: HCPCS

## 2020-09-02 ENCOUNTER — TELEPHONE (OUTPATIENT)
Dept: CARDIOLOGY CLINIC | Age: 50
End: 2020-09-02

## 2020-09-02 NOTE — TELEPHONE ENCOUNTER
----- Message from Trever Olmedo MD sent at 9/1/2020 10:08 PM EDT -----  Please call patient. Labs look good. No changes.

## 2020-09-08 ENCOUNTER — TELEPHONE (OUTPATIENT)
Dept: INTERNAL MEDICINE CLINIC | Age: 50
End: 2020-09-08

## 2020-09-08 ENCOUNTER — HOSPITAL ENCOUNTER (OUTPATIENT)
Age: 50
Setting detail: SPECIMEN
Discharge: HOME | End: 2020-09-08
Payer: COMMERCIAL

## 2020-09-08 ENCOUNTER — TELEPHONE (OUTPATIENT)
Dept: ENDOCRINOLOGY | Age: 50
End: 2020-09-08

## 2020-09-08 LAB
ANION GAP SERPL CALCULATED.3IONS-SCNC: 12 MMOL/L (ref 3–16)
BUN BLDV-MCNC: 60 MG/DL (ref 7–20)
CALCIUM SERPL-MCNC: 10.4 MG/DL (ref 8.3–10.6)
CHLORIDE BLD-SCNC: 92 MMOL/L (ref 99–110)
CO2: 30 MMOL/L (ref 21–32)
CREAT SERPL-MCNC: 1.3 MG/DL (ref 0.6–1.1)
GFR AFRICAN AMERICAN: 52
GFR NON-AFRICAN AMERICAN: 43
GLUCOSE BLD-MCNC: 235 MG/DL (ref 70–99)
POTASSIUM SERPL-SCNC: 4.4 MMOL/L (ref 3.5–5.1)
PRO-BNP: 75 PG/ML (ref 0–124)
SODIUM BLD-SCNC: 134 MMOL/L (ref 136–145)

## 2020-09-08 PROCEDURE — 83880 ASSAY OF NATRIURETIC PEPTIDE: CPT

## 2020-09-08 PROCEDURE — 80048 BASIC METABOLIC PNL TOTAL CA: CPT

## 2020-09-08 PROCEDURE — 36415 COLL VENOUS BLD VENIPUNCTURE: CPT

## 2020-09-08 NOTE — TELEPHONE ENCOUNTER
[facility] needs verbal order from you saying you will continue to order home care for pt . Please call Guadalupe Moore at [phone]. Thanks.

## 2020-09-10 NOTE — TELEPHONE ENCOUNTER
Fax from [employer]. PA approval from [facility]. PA approval faxed to Berna Rankin 26.

## 2020-09-14 RX ORDER — OMEPRAZOLE 20 MG/1
CAPSULE, DELAYED RELEASE ORAL
Qty: 60 CAPSULE | Refills: 0 | Status: SHIPPED | OUTPATIENT
Start: 2020-09-14 | End: 2020-10-05

## 2020-09-14 RX ORDER — AMOXICILLIN 250 MG
CAPSULE ORAL
Qty: 120 TABLET | Refills: 0 | Status: SHIPPED | OUTPATIENT
Start: 2020-09-14 | End: 2020-10-28 | Stop reason: ALTCHOICE

## 2020-09-17 ENCOUNTER — VIRTUAL VISIT (OUTPATIENT)
Dept: ENDOCRINOLOGY | Age: 50
End: 2020-09-17
Payer: COMMERCIAL

## 2020-09-17 PROCEDURE — G8427 DOCREV CUR MEDS BY ELIG CLIN: HCPCS | Performed by: INTERNAL MEDICINE

## 2020-09-17 PROCEDURE — 2022F DILAT RTA XM EVC RTNOPTHY: CPT | Performed by: INTERNAL MEDICINE

## 2020-09-17 PROCEDURE — 3017F COLORECTAL CA SCREEN DOC REV: CPT | Performed by: INTERNAL MEDICINE

## 2020-09-17 PROCEDURE — 99214 OFFICE O/P EST MOD 30 MIN: CPT | Performed by: INTERNAL MEDICINE

## 2020-09-17 PROCEDURE — G9899 SCRN MAM PERF RSLTS DOC: HCPCS | Performed by: INTERNAL MEDICINE

## 2020-09-17 PROCEDURE — 3051F HG A1C>EQUAL 7.0%<8.0%: CPT | Performed by: INTERNAL MEDICINE

## 2020-09-17 NOTE — TELEPHONE ENCOUNTER
Trulance 3MG tablets  Approved on May 8   CaseId:[id number];Status:Approved; Review Type:Prior Auth; Coverage Start Date:05/08/2020; Coverage End Date:05/08/2021;

## 2020-09-17 NOTE — PROGRESS NOTES
program. She is compliant with treatment some of the time. Her weight is stable. She is following a generally healthy diet. Meal planning includes avoidance of concentrated sweets. She has had a previous visit with a dietitian. She rarely participates in exercise. There is no change in her home blood glucose trend. Her breakfast blood glucose is taken between 8-9 am. Her breakfast blood glucose range is generally 140-180 mg/dl. Her lunch blood glucose is taken between 12-1 pm. Her lunch blood glucose range is generally 140-180 mg/dl. Her dinner blood glucose is taken between 7-8 pm. Her dinner blood glucose range is generally >200 mg/dl. An ACE inhibitor/angiotensin II receptor blocker is being taken. She sees a podiatrist.Eye exam is current. She denies any significant hypoglycemia since last visit,   Her fingerstick blood glucose running high    She was in the hospital for pneumonia in May 2020     Weight trend: stable  Prior visit with dietician: yes  Current diet: on average, 3 meals per day  Current exercise: rare    Has there been any hospitalization, surgery or major illness since the last visit?  Yes   Has there been any new school, family or social problems since the last visit?  no   Has there been any new family history of members with diabetes, heart disease, stroke, or endocrine related problems since the last visit?  no    Past Medical History:   Diagnosis Date    Anxiety     Anxiety and depression     Arthritis     not sure of specific type    Asthma     CAD (coronary artery disease)     Cancer ([facility] [address] 2007    left breast    Cerebral artery occlusion with cerebral infarction ([facility] [address] [facility]     CHF (congestive heart failure) ([facility] [address] 2018    Chronic kidney disease     renal insufficency/to see Dr Mirtha Mccarthy    Chronic pain     Constipation     COPD (chronic obstructive pulmonary disease) ([address]     Depression     Diabetes mellitus ([facility] [address]     Diabetic polyneuropathy associated with type 2 medication counseling    Hesitancy of micturition    Weight loss counseling, encounter for    Complex care coordination    Oropharyngeal dysphagia    Constipation due to pain medication    Right hand pain    Fungal dermatitis    Mouth pain    Environmental and seasonal allergies    Hypersomnolence    Enlargement of submandibular gland    Jaw pain    Pain in both lower extremities    Vertigo    Fatigue    Edema of right lower extremity    Neck muscle spasm    Hot flashes    Chest pain    Excessive sweating    Tongue pain    Pneumonia    Acute respiratory failure with hypoxemia (HCC)    Tension type headache    Hyperglycemia    Inattention    Severe episode of recurrent major depressive disorder (HCC)    Alopecia    Aphthous ulcer of tongue    Seborrhea capitis in adult     Past Surgical History:   Procedure Laterality Date    BREAST SURGERY      left mastectomy    CARPAL TUNNEL RELEASE      Bilateral    FINGER CONTRACTURE SURGERY      HYSTERECTOMY  2005    USO    TONSILLECTOMY      UPPER GASTROINTESTINAL ENDOSCOPY  2019    stretched esophagous      Social History     Socioeconomic History    Marital status:      Spouse name: Nghia Torres Number of children: 3    Years of education: Not on file    Highest education level: Not on file   Occupational History    Occupation: disabled   Social Needs    Financial resource strain: Not on file    Food insecurity     Worry: Not on file     Inability: Not on file   [employer] needs     Medical: Not on file     Non-medical: Not on file   Tobacco Use    Smoking status: Never Smoker    Smokeless tobacco: Never Used   Substance and Sexual Activity    Alcohol use: No    Drug use: No    Sexual activity: Not on file   Lifestyle    Physical activity     Days per week: Not on file     Minutes per session: Not on file    Stress: Not on file   Relationships    Social connections     Talks on phone: Not on file     Gets together: Not on file     Attends Restoration service: Not on file     Active member of club or organization: Not on file     Attends meetings of clubs or organizations: Not on file     Relationship status: Not on file    Intimate partner violence     Fear of current or ex partner: Not on file     Emotionally abused: Not on file     Physically abused: Not on file     Forced sexual activity: Not on file   Other Topics Concern    Not on file   Social History Narrative    Not on file     Family History   Problem Relation Age of Onset    Asthma Other     Cancer Other     Depression Other     Diabetes Other     Hypertension Other     High Cholesterol Other     Migraines Other     Heart Attack Father 72         of MI    High Blood Pressure Mother     Diabetes Mother      Current Outpatient Medications   Medication Sig Dispense Refill    senna-docusate (STOOL SOFTENER/LAXATIVE) 8.6-50 MG per tablet TAKE 2 TABLETS BY MOUTH TWO TIMES A  tablet 0    B-D UF III MINI PEN NEEDLES 31G X 5 MM MISC use five times daily with insulin 100 each 0    omeprazole (PRILOSEC) 20 MG delayed release capsule TAKE 1 CAPSULE BY MOUTH TWO TIMES A DAY  60 capsule 0    spironolactone (ALDACTONE) 50 MG tablet TAKE 2 TABLETS BY MOUTH IN THE MORNING and 1 tablet in the evening 90 tablet 0    montelukast (SINGULAIR) 10 MG tablet TAKE 1 TABLET BY MOUTH ONE TIME A DAY  30 tablet 3    loratadine (CLARITIN) 10 MG tablet TAKE 1 TABLET BY MOUTH ONE TIME A DAY  30 tablet 0    ferrous sulfate (IRON 325) 325 (65 Fe) MG tablet TAKE 1 TABLET BY MOUTH TWO TIMES A DAY WITH MEALS 180 tablet 0    lidocaine viscous hcl (XYLOCAINE) 2 % SOLN solution Take 5 mLs by mouth every 3 hours as needed for Irritation or Pain 100 mL 2    ketoconazole (NIZORAL) 2 % shampoo Apply topically daily as needed.  120 mL 1    Insulin Degludec (TRESIBA FLEXTOUCH) 200 UNIT/ML SOPN inject 160 units subcutaneously once daily 16 pen 3    NOVOLOG FLEXPEN 100 UNIT/ML injection pen INJECT 30 TO 50 UNITS INTO THE SKIN THREE TIMES DAILY BEFORE MEALS 30 mL 3    metOLazone (ZAROXOLYN) 2.5 MG tablet TAKE 1 TABLET BY MOUTH TWO TIMES A WEEK AS NEEDED for weight over 180 pounds. do not take two days in a row 60 tablet 0    blood glucose test strips (FREESTYLE LITE) strip test blood sugar four times daily 200 strip 5    nitroGLYCERIN (NITROSTAT) 0.4 MG SL tablet Place 1 tablet under the tongue every 5 minutes as needed for Chest pain up to max of 3 total doses. If no relief after 1 dose, call 911. 25 tablet 3    vitamin D3 (CHOLECALCIFEROL) 25 MCG (1000 UT) TABS tablet TAKE 3 TABLETS BY MOUTH ONE TIME A DAY 90 tablet 5    levothyroxine (SYNTHROID) 150 MCG tablet Take 1 tablet by mouth every morning (before breakfast) 90 tablet 3    Ertugliflozin L-PyroglutamicAc (STEGLATRO) 5 MG TABS Take 1 tablet by mouth one time daily. 30 tablet 2    diazePAM (VALIUM) 5 MG tablet Take 5 mg by mouth every 8 hours as needed for Anxiety.  cariprazine hcl (VRAYLAR) 1.5 MG capsule Take 1.5 mg by mouth daily      butalbital-acetaminophen-caffeine (FIORICET, ESGIC) -40 MG per tablet Take 1 tablet by mouth every 6 hours as needed for Headaches 30 tablet 1    Febuxostat 80 MG TABS Take 80 mg by mouth daily      glipiZIDE (GLUCOTROL XL) 10 MG extended release tablet Take 10 mg by mouth 2 times daily      pregabalin (LYRICA) 50 MG capsule Take 50 mg by mouth 2 times daily.       lubiprostone (AMITIZA) 24 MCG capsule Take 24 mcg by mouth daily (with breakfast)      albuterol (PROVENTIL) (2.5 MG/3ML) 0.083% nebulizer solution Take 3 mLs by nebulization every 6 hours as needed for Wheezing 120 each 3    tiZANidine (ZANAFLEX) 2 MG tablet Take 1 tablet by mouth every 8 hours as needed (pain and spasm) 30 tablet 3    torsemide (DEMADEX) 100 MG tablet TAKE 1 TABLET BY MOUTH IN THE MORNING AND 1/2 TABLET IN THE EVENING  135 tablet 3    ranolazine (RANEXA) 500 MG extended release tablet TAKE 1 TABLET BY MOUTH TWO TIMES A DAY  180 tablet 3    Glucagon 1 MG/0.2ML SOSY To be used in case of severe hypoglycemia 2 Syringe 2    leflunomide (ARAVA) 10 MG tablet Take 5 mg by mouth daily       metoprolol tartrate (LOPRESSOR) 25 MG tablet Take 1 tablet by mouth 2 times daily 180 tablet 3    simvastatin (ZOCOR) 20 MG tablet TAKE 1 TABLET BY MOUTH nightly 90 tablet 3    colchicine (COLCRYS) 0.6 MG tablet Take 0.6 mg by mouth 2 times daily       albuterol sulfate  (90 Base) MCG/ACT inhaler Inhale 2 puffs into the lungs every 6 hours as needed for Wheezing 1 Inhaler 6    OXYGEN Inhale 3 L into the lungs nightly Sometimes with activity      oxyCODONE (OXYCONTIN) 20 MG extended release tablet Take 20 mg by mouth every 12 hours.  aspirin 81 MG EC tablet Take 81 mg by mouth daily      sulfaSALAzine (AZULFIDINE) 500 MG tablet Take 1,000 mg by mouth 2 times daily       calcium carbonate-vitamin D (CALCIUM 600+D) 600-200 MG-UNIT TABS Take 1 tablet by mouth 2 times daily      benztropine (COGENTIN) 1 MG tablet Take 1 mg by mouth nightly       folic acid (FOLVITE) 1 MG tablet Take 1 mg by mouth daily      oxyCODONE-acetaminophen (PERCOCET)  MG per tablet Take 1 tablet by mouth every 4 hours as needed for Pain . Earliest Fill Date: 17 100 tablet 0    duloxetine (CYMBALTA) 60 MG capsule Take 60 mg by mouth 2 times daily. No current facility-administered medications for this visit.       Allergies   Allergen Reactions    Iv Dye [Iodides] Anaphylaxis     allergic to ct scan dye, not the MRI    Diazepam Other (See Comments)    Trazodone And Nefazodone Other (See Comments)     Makes patient feel very sluggish     Family Status   Relation Name Status    Other family h/o Alive    Father      Mother  Alive    Sister  Alive    Brother  Alive       Review of Systems  Constitutional  Patient denies any weight loss denies any weight gain,  Eyes   Pt denies any double vision blurred vision or decreased peripheral vision  Head ear nose throat  Denies any trouble swallowing denies any hoarseness  Denies any shortness of breath denies  Cardiovascular  Denies any chest pain denies any palpitations currently  Gastrointestinal  Denies any nausea ,vomiting ,constipation or diarrhea  Hematological/lymphatic  Denies any blood clot denies or any painful lymph nodes  Genitourinary  Denies any frequent urination denies any nighttime urination  Skin  Denies any acne denies any changes in facial body hair denies any non healing wounds Musculoskeletal   denies any joint or bone pain ,denies any muscle weakness ,denies any new fracture  Endocrine  Denies any excessive thirst denies any excessive heat intolerance or cold intolerance . OBJECTIVE:  There were no vitals taken for this visit.    Wt Readings from Last 3 Encounters:   07/09/20 185 lb (83.9 kg)   06/22/20 186 lb (84.4 kg)   03/28/20 185 lb 12.8 oz (84.3 kg)         Constitutional: no acute distress, well appearing and well nourished  Psychiatric: oriented to person, place and time, judgement and insight and normal, recent and remote memory intact and mood and affect are normal  Skin: skin and subcutaneous tissue is normal without visible mass,   Head and Face: visual inspection  of head and face revealed no abnormalities  Eyes: visual inspection showed no lid or conjunctival swelling, erythema or discharge, pupils are normal, equal, round  Ears/Nose: external inspection of ears and nose revealed no abnormalities, hearing is grossly normal  Oropharynx/Mouth/Face: lips, tongue and gums appear  normal with no lesions, the voice quality was normal  Neck: neck appears symmetric, with no visible masses,   Pulmonary: no increased work of breathing or signs of respiratory distress,  Musculoskeletal: normal on inspection    Neurological: normal coordination and normal general cortical function  Lab Results   Component Value Date    LABA1C 7.8 03/26/2020    LABA1C 7.3 11/19/2019    LABA1C 8.5 08/15/2019         ASSESSMENT/PLAN:    ---- Type 2 diabetes mellitus with  complication, with long-term current use of insulin also has DAN and gastroparesis --UNCONTROLLED 8.5>>7.3>>7.8  Fasting glucose >150 She still has erratic sleeping and eating habits and tends not to exercise regularly  Currently taking 150  Bid  units of tresiba she was advised to increase the dose to 160 units   Send sugar log to me   Fasting glucose 150---250  --- Patient tends to take 30-50 units of Humalog with the meals and bases it on her blood sugar log and the amount of food   ---VGO not approved   -- on Steglatro as Calin Barron was not approved with her insurance  She was cautioned about dehydration associated with Steglatro on top of her other diuretics she gets weekly blood work which is managed by cardiology  --- Due to her hypoxemia and MARIA D I stopped the metformin In January 2018  --- She is currently on 10 mg of Glucotrol XL --- advise patient and her  to stop taking this medication as her most recent creatinine was elevated and she tends to have spikes in her creatinine due to being on high doses of diuretics     ---Her depression has been hindering in her optimal glycemic control ---she claims to eat only one meal a day and that also has minimal carbs ? But I m not sure if she really counts her snack at all she tends to snack on dates and fruits which both can lead to elevated blood glucose she typically does not take any short acting insulin for the snacks she was advised to check her fingerstick more often  We had a lengthy discussion about these issues ---she is reliant on her  to help with diabetes care   we had a lengthy discussion about glycmeic goals and treatment options   Hypoglycemia protocol reviewed in detail with patient Patient was advised to carry glucose tablets and also have glucagon emergency kit. Provided with RX for glucagon.      Health maintenance  -advised to have annual dilated eye exam uptodate pos retinopathy follows with Dr Galindo Baron last visit in 2019  -last microalb/creat ratio elevated at >800 will recheck unable to calculate she was advised to follow with nephrology     foot exam --- she saw a podiatrist who gave her steroid shot she goes for regular follow up with her --last visit in 2019      --- Episodes of hot flashes  She is noted to have [facility] level less than 0.1, FSH level of 2.2, plasma metanephrines in the normal range in December 2019  Her IGF-I level was elevated I will repeat with the next lab    --- Hypothyroidism    Hypothyroidism TSH ) 0.01 >>0.9 >>2 > 1.2 >>2.3   she is on 150 mcg daily continue on same dose   ----she had thyroid hemiagensis left lobe absent   --She has been issues with swallowing --- had a CT scan of neck done which revealed normal thyroid normal submandibular and normal architecture.        --- Mixed hyperlipidemia  On statins   Last TG high and LDL was 115 in April 2018   Encouraged to work on diet and reduce the amount of fat in her diet as well as work on carbohydrate restriction      --- Hx of breast cancer  Follows with onc   On tamoxifen diet 3 modification which apparently will be stopped in 2019    ---Primary fibromyalgia  On Lyrica follows with Dr Citlalli Rust     --Malignant neoplasm of overlapping sites of left female breast, unspecified estrogen receptor status ([facility] [address]  Breast cancer s/p surgery and chemo 2008   She is on tamoxifen which according to pt gives her pain in her arms and hence she takes percocet     --- Essential hypertension  Controlled now   Continue with current regimen    Patient denies any chest pain any shortness of breath    ----Coronary artery disease involving native coronary artery of native heart without angina pectoris  Stable follows with Dr Rico Cleveland     --- Lymphedema of upper extremity following lymphadenectomy  Stable     ---- Dysthymia  Depression   She is on Latuda    Her dose verbal consent to use the video visit. Total time spent :30 min         Return in about 3 months (around 12/17/2020).

## 2020-09-21 ENCOUNTER — TELEPHONE (OUTPATIENT)
Dept: PULMONOLOGY | Age: 50
End: 2020-09-21

## 2020-09-21 NOTE — TELEPHONE ENCOUNTER
Called pt to confirm appointment and  mentioned that pt wants to see dr Sidney Siddiqi, he kept saying the new female doctor.     They are doing a doxy tomorrow

## 2020-09-23 ENCOUNTER — VIRTUAL VISIT (OUTPATIENT)
Dept: INTERNAL MEDICINE CLINIC | Age: 50
End: 2020-09-23
Payer: COMMERCIAL

## 2020-09-23 PROBLEM — L72.9 SCALP CYST: Status: ACTIVE | Noted: 2020-09-23

## 2020-09-23 PROBLEM — B37.0 THRUSH: Status: ACTIVE | Noted: 2020-09-23

## 2020-09-23 PROCEDURE — G8427 DOCREV CUR MEDS BY ELIG CLIN: HCPCS | Performed by: INTERNAL MEDICINE

## 2020-09-23 PROCEDURE — 99214 OFFICE O/P EST MOD 30 MIN: CPT | Performed by: INTERNAL MEDICINE

## 2020-09-23 PROCEDURE — 1036F TOBACCO NON-USER: CPT | Performed by: INTERNAL MEDICINE

## 2020-09-23 PROCEDURE — G9899 SCRN MAM PERF RSLTS DOC: HCPCS | Performed by: INTERNAL MEDICINE

## 2020-09-23 PROCEDURE — 3051F HG A1C>EQUAL 7.0%<8.0%: CPT | Performed by: INTERNAL MEDICINE

## 2020-09-23 PROCEDURE — G8417 CALC BMI ABV UP PARAM F/U: HCPCS | Performed by: INTERNAL MEDICINE

## 2020-09-23 PROCEDURE — 2022F DILAT RTA XM EVC RTNOPTHY: CPT | Performed by: INTERNAL MEDICINE

## 2020-09-23 PROCEDURE — 3017F COLORECTAL CA SCREEN DOC REV: CPT | Performed by: INTERNAL MEDICINE

## 2020-09-23 RX ORDER — TIZANIDINE 2 MG/1
TABLET ORAL
Qty: 30 TABLET | Refills: 0 | Status: SHIPPED | OUTPATIENT
Start: 2020-09-23 | End: 2020-09-23 | Stop reason: SDUPTHER

## 2020-09-23 RX ORDER — FLUCONAZOLE 100 MG/1
100 TABLET ORAL DAILY
Qty: 7 TABLET | Refills: 0 | Status: SHIPPED | OUTPATIENT
Start: 2020-09-23 | End: 2021-01-26

## 2020-09-23 RX ORDER — TIZANIDINE 2 MG/1
TABLET ORAL
Qty: 30 TABLET | Refills: 0 | Status: SHIPPED | OUTPATIENT
Start: 2020-09-23 | End: 2021-01-14

## 2020-09-23 NOTE — PROGRESS NOTES
TELEHEALTH EVALUATION -- Audio/Visual (During ZXKUH-63 public health emergency)    HPI    Seen via virtual visit. States that her mouth is getting worse. Cannot eat anything as it burns. Is getting worse. Has been going on for at least 6 weeks. Throat also is hurting. Eating hurst, even drinking water hurts. No fevers. Has been losing weight and gaining weight. Heart failure is stable. Does have HH. Sugars have been \"controlled to high- controlled more than high\". Only new medication is Michelle Flattery which was started 3 months ago. Mouth problems started before she started taking Vraylar. Dentist gave her ibuprofen for her teeth and amoxicillin for dental infection. Also has a tender lump on her scalp. Is getting bigger. Also needs a note for [facility] to continue.      Past Medical History:   Diagnosis Date    Anxiety     Anxiety and depression     Arthritis     not sure of specific type    Asthma     CAD (coronary artery disease)     Cancer ([address] 2007    left breast    Cerebral artery occlusion with cerebral infarction ([address]     CHF (congestive heart failure) ([address] 2018    Chronic kidney disease     renal insufficency/to see Dr Vega Reasons    Chronic pain     Constipation     COPD (chronic obstructive pulmonary disease) ([address]     Depression     Diabetes mellitus ([address]     Diabetic polyneuropathy associated with type 2 diabetes mellitus ([address] 2/2/2018    Dysthymia 2/2/2018    ESBL (extended spectrum beta-lactamase) producing bacteria infection 03/14/2018    urine    Fibromyalgia     Gastric ulcer, unspecified as acute or chronic, without mention of hemorrhage, perforation, or obstruction     GERD (gastroesophageal reflux disease)     Gout     HIGH CHOLESTEROL     Hypertension     Hypothyroidism     Severe persistent asthma without complication 1/8/0074    Thyroid disease     TIA (transient ischemic attack)     with occasional left leg and hand weakness       Past Surgical History:   Procedure Laterality Date    BREAST SURGERY      left mastectomy    CARPAL TUNNEL RELEASE      Bilateral    FINGER CONTRACTURE SURGERY      HYSTERECTOMY  2005    USO    TONSILLECTOMY      UPPER GASTROINTESTINAL ENDOSCOPY  2019    stretched esophagous        Family History   Problem Relation Age of Onset    Asthma Other     Cancer Other     Depression Other     Diabetes Other     Hypertension Other     High Cholesterol Other     Migraines Other     Heart Attack Father 72         of MI    High Blood Pressure Mother     Diabetes Mother        Allergies   Allergen Reactions    Iv Dye [Iodides] Anaphylaxis     allergic to ct scan dye, not the MRI    Diazepam Other (See Comments)    Trazodone And Nefazodone Other (See Comments)     Makes patient feel very sluggish       Current Outpatient Medications   Medication Sig Dispense Refill    tiZANidine (ZANAFLEX) 2 MG tablet TAKE 1 TABLET BY MOUTH EVERY EIGHT HOURS AS NEEDED FOR PAIN AND SPASM 30 tablet 0    nystatin (MYCOSTATIN) 867360 UNIT/ML suspension Take 5 mLs by mouth 4 times daily 500 mL 1    Magic Mouthwash (MIRACLE MOUTHWASH) Swish and spit 5 mLs 4 times daily as needed for Irritation or Pain 300 mL 2    fluconazole (DIFLUCAN) 100 MG tablet Take 1 tablet by mouth daily for 7 days 7 tablet 0    senna-docusate (STOOL SOFTENER/LAXATIVE) 8.6-50 MG per tablet TAKE 2 TABLETS BY MOUTH TWO TIMES A  tablet 0    B-D UF III MINI PEN NEEDLES 31G X 5 MM MISC use five times daily with insulin 100 each 0    omeprazole (PRILOSEC) 20 MG delayed release capsule TAKE 1 CAPSULE BY MOUTH TWO TIMES A DAY  60 capsule 0    spironolactone (ALDACTONE) 50 MG tablet TAKE 2 TABLETS BY MOUTH IN THE MORNING and 1 tablet in the evening 90 tablet 0    montelukast (SINGULAIR) 10 MG tablet TAKE 1 TABLET BY MOUTH ONE TIME A DAY  30 tablet 3    loratadine (CLARITIN) 10 MG tablet TAKE 1 TABLET BY MOUTH ONE TIME A DAY  30 tablet 0    ferrous sulfate (IRON breakfast)      albuterol (PROVENTIL) (2.5 MG/3ML) 0.083% nebulizer solution Take 3 mLs by nebulization every 6 hours as needed for Wheezing 120 each 3    torsemide (DEMADEX) 100 MG tablet TAKE 1 TABLET BY MOUTH IN THE MORNING AND 1/2 TABLET IN THE EVENING  135 tablet 3    ranolazine (RANEXA) 500 MG extended release tablet TAKE 1 TABLET BY MOUTH TWO TIMES A DAY  180 tablet 3    Glucagon 1 MG/0.2ML SOSY To be used in case of severe hypoglycemia 2 Syringe 2    leflunomide (ARAVA) 10 MG tablet Take 5 mg by mouth daily       metoprolol tartrate (LOPRESSOR) 25 MG tablet Take 1 tablet by mouth 2 times daily 180 tablet 3    simvastatin (ZOCOR) 20 MG tablet TAKE 1 TABLET BY MOUTH nightly 90 tablet 3    albuterol sulfate  (90 Base) MCG/ACT inhaler Inhale 2 puffs into the lungs every 6 hours as needed for Wheezing 1 Inhaler 6    OXYGEN Inhale 3 L into the lungs nightly Sometimes with activity      oxyCODONE (OXYCONTIN) 20 MG extended release tablet Take 20 mg by mouth every 12 hours.  aspirin 81 MG EC tablet Take 81 mg by mouth daily      sulfaSALAzine (AZULFIDINE) 500 MG tablet Take 1,000 mg by mouth 2 times daily       calcium carbonate-vitamin D (CALCIUM 600+D) 600-200 MG-UNIT TABS Take 1 tablet by mouth 2 times daily      benztropine (COGENTIN) 1 MG tablet Take 1 mg by mouth nightly       folic acid (FOLVITE) 1 MG tablet Take 1 mg by mouth daily      oxyCODONE-acetaminophen (PERCOCET)  MG per tablet Take 1 tablet by mouth every 4 hours as needed for Pain . Earliest Fill Date: 6/8/17 100 tablet 0    duloxetine (CYMBALTA) 60 MG capsule Take 60 mg by mouth 2 times daily. No current facility-administered medications for this visit.              PHYSICAL EXAMINATION:  [ INSTRUCTIONS:  \"[x]\" Indicates a positive item  \"[]\" Indicates a negative item  -- DELETE ALL ITEMS NOT EXAMINED]      Constitutional:  [] Appears well-developed and well-nourished [x] No apparent distress     [] with Dr. Isabela Mendoza, asthma followed by Dr. Melvin Jacinto, chronic constipation and dysphagia followed by Dr. Kia Renteria,  significant depression and anxiety followed by psychiatry. Hx of breast cancer  Continues to follow with Dr. Isabela Mendoza and remains on tamoxifen. Immigrant with language difficulty  Often declines  services and relies on her  for interpretation. I do think this is cultural in part. Malignant neoplasm of overlapping sites of left female breast [facility])  Premenopausal diagnosed in December 2007. Has resolved. Treated with mastectomy and lymph node dissection, chemotherapy and radiation therapy. Remains on tamoxifen. Poorly controlled type 2 diabetes mellitus ([facility] [address]  Follows with Dr. Grant Grover. RA (rheumatoid arthritis) ([facility] [address]  Follows with Dr. Julio Munoz. Scalp cyst  No treatment needed at this time. Warm compresses prn. Severe persistent asthma without complication  Follows with Dr. Melvin Jacinto for pulmonology. Thrush  Treat with nystatin. Pursuant to the emergency declaration under the [facility] [place], [facility] waiver authority and the [employer] and [employer] Act, this Virtual  Visit was conducted, with patient's consent, to reduce the patient's risk of exposure to COVID-19 and provide continuity of care for an established patient. Services were provided through a video synchronous discussion virtually to substitute for in-person clinic visit. 25 minutes spent on Telehealth visit. Telehealth visit was done in lieu of face to face visit due to XHUKI-88 public health emergency.

## 2020-09-24 NOTE — PROGRESS NOTES
Left VM for patient to call office back to schedule a f/u appt. Reminder letter and lab orders placed in the mail.

## 2020-09-25 ENCOUNTER — HOSPITAL ENCOUNTER (OUTPATIENT)
Age: 50
Setting detail: SPECIMEN
Discharge: HOME | End: 2020-09-25
Payer: COMMERCIAL

## 2020-09-25 LAB
ALBUMIN SERPL-MCNC: 4.4 G/DL (ref 3.4–5)
ALP BLD-CCNC: 128 U/L (ref 40–129)
ALT SERPL-CCNC: 14 U/L (ref 10–40)
ANION GAP SERPL CALCULATED.3IONS-SCNC: 11 MMOL/L (ref 3–16)
AST SERPL-CCNC: 15 U/L (ref 15–37)
BASOPHILS ABSOLUTE: 0.1 K/UL (ref 0–0.2)
BASOPHILS RELATIVE PERCENT: 0.6 %
BILIRUB SERPL-MCNC: <0.2 MG/DL (ref 0–1)
BILIRUBIN DIRECT: <0.2 MG/DL (ref 0–0.3)
BILIRUBIN, INDIRECT: NORMAL MG/DL (ref 0–1)
BUN BLDV-MCNC: 29 MG/DL (ref 7–20)
CALCIUM SERPL-MCNC: 9.8 MG/DL (ref 8.3–10.6)
CHLORIDE BLD-SCNC: 95 MMOL/L (ref 99–110)
CO2: 33 MMOL/L (ref 21–32)
CREAT SERPL-MCNC: 1.2 MG/DL (ref 0.6–1.1)
EOSINOPHILS ABSOLUTE: 0.1 K/UL (ref 0–0.6)
EOSINOPHILS RELATIVE PERCENT: 1.2 %
GFR AFRICAN AMERICAN: 57
GFR NON-AFRICAN AMERICAN: 47
GLUCOSE BLD-MCNC: 88 MG/DL (ref 70–99)
HCT VFR BLD CALC: 35.9 % (ref 36–48)
HEMOGLOBIN: 11.7 G/DL (ref 12–16)
LYMPHOCYTES ABSOLUTE: 2.8 K/UL (ref 1–5.1)
LYMPHOCYTES RELATIVE PERCENT: 27.7 %
MCH RBC QN AUTO: 28.5 PG (ref 26–34)
MCHC RBC AUTO-ENTMCNC: 32.6 G/DL (ref 31–36)
MCV RBC AUTO: 87.3 FL (ref 80–100)
MONOCYTES ABSOLUTE: 0.8 K/UL (ref 0–1.3)
MONOCYTES RELATIVE PERCENT: 7.8 %
NEUTROPHILS ABSOLUTE: 6.2 K/UL (ref 1.7–7.7)
NEUTROPHILS RELATIVE PERCENT: 62.7 %
PDW BLD-RTO: 14.3 % (ref 12.4–15.4)
PLATELET # BLD: 283 K/UL (ref 135–450)
PMV BLD AUTO: 9.2 FL (ref 5–10.5)
POTASSIUM SERPL-SCNC: 4.2 MMOL/L (ref 3.5–5.1)
PRO-BNP: 183 PG/ML (ref 0–124)
RBC # BLD: 4.11 M/UL (ref 4–5.2)
SEDIMENTATION RATE, ERYTHROCYTE: >130 MM/HR (ref 0–30)
SODIUM BLD-SCNC: 139 MMOL/L (ref 136–145)
TOTAL PROTEIN: 7.8 G/DL (ref 6.4–8.2)
WBC # BLD: 9.9 K/UL (ref 4–11)

## 2020-09-25 PROCEDURE — 80076 HEPATIC FUNCTION PANEL: CPT

## 2020-09-25 PROCEDURE — 83880 ASSAY OF NATRIURETIC PEPTIDE: CPT

## 2020-09-25 PROCEDURE — 85652 RBC SED RATE AUTOMATED: CPT

## 2020-09-25 PROCEDURE — 80048 BASIC METABOLIC PNL TOTAL CA: CPT

## 2020-09-25 PROCEDURE — 85025 COMPLETE CBC W/AUTO DIFF WBC: CPT

## 2020-09-25 PROCEDURE — 86140 C-REACTIVE PROTEIN: CPT

## 2020-09-25 PROCEDURE — 36415 COLL VENOUS BLD VENIPUNCTURE: CPT

## 2020-09-26 LAB — C-REACTIVE PROTEIN: 19.1 MG/L (ref 0–5.1)

## 2020-09-27 PROBLEM — M62.838 NECK MUSCLE SPASM: Status: RESOLVED | Noted: 2019-10-21 | Resolved: 2020-09-27

## 2020-09-28 ENCOUNTER — TELEPHONE (OUTPATIENT)
Dept: INTERNAL MEDICINE CLINIC | Age: 50
End: 2020-09-28

## 2020-09-28 NOTE — ASSESSMENT & PLAN NOTE
Often declines  services and relies on her  for interpretation. I do think this is cultural in part.

## 2020-09-28 NOTE — ASSESSMENT & PLAN NOTE
Premenopausal diagnosed in December 2007. Has resolved. Treated with mastectomy and lymph node dissection, chemotherapy and radiation therapy. Remains on tamoxifen.

## 2020-09-28 NOTE — ASSESSMENT & PLAN NOTE
Patient has multiple complex health issues including diabetes mellitus type 2 followed by Dr. Mattie Tamayo, congestive heart failure followed by Dr. Phan Kiser, rheumatoid arthritis and gout followed by Dr. Trish Steiner, breast cancer followed with Dr. January Corral, asthma followed by Dr. Ariel Bella, chronic constipation and dysphagia followed by Dr. Xavi Oro,  significant depression and anxiety followed by psychiatry.

## 2020-09-30 ENCOUNTER — HOSPITAL ENCOUNTER (OUTPATIENT)
Age: 50
Setting detail: SPECIMEN
Discharge: HOME | End: 2020-09-30
Payer: COMMERCIAL

## 2020-09-30 LAB
ALBUMIN SERPL-MCNC: 4 G/DL (ref 3.4–5)
ALP BLD-CCNC: 143 U/L (ref 40–129)
ALT SERPL-CCNC: 17 U/L (ref 10–40)
AST SERPL-CCNC: <5 U/L (ref 15–37)
BASOPHILS ABSOLUTE: 0.1 K/UL (ref 0–0.2)
BASOPHILS RELATIVE PERCENT: 0.6 %
BILIRUB SERPL-MCNC: <0.2 MG/DL (ref 0–1)
BILIRUBIN DIRECT: <0.2 MG/DL (ref 0–0.3)
BILIRUBIN, INDIRECT: ABNORMAL MG/DL (ref 0–1)
CREAT SERPL-MCNC: 1.4 MG/DL (ref 0.6–1.1)
EOSINOPHILS ABSOLUTE: 0.1 K/UL (ref 0–0.6)
EOSINOPHILS RELATIVE PERCENT: 1.3 %
GFR AFRICAN AMERICAN: 48
GFR NON-AFRICAN AMERICAN: 40
HCT VFR BLD CALC: 34.7 % (ref 36–48)
HEMOGLOBIN: 11.6 G/DL (ref 12–16)
LYMPHOCYTES ABSOLUTE: 2.8 K/UL (ref 1–5.1)
LYMPHOCYTES RELATIVE PERCENT: 28.1 %
MCH RBC QN AUTO: 29.2 PG (ref 26–34)
MCHC RBC AUTO-ENTMCNC: 33.5 G/DL (ref 31–36)
MCV RBC AUTO: 87.1 FL (ref 80–100)
MONOCYTES ABSOLUTE: 0.7 K/UL (ref 0–1.3)
MONOCYTES RELATIVE PERCENT: 7.2 %
NEUTROPHILS ABSOLUTE: 6.2 K/UL (ref 1.7–7.7)
NEUTROPHILS RELATIVE PERCENT: 62.8 %
PDW BLD-RTO: 14.6 % (ref 12.4–15.4)
PLATELET # BLD: 306 K/UL (ref 135–450)
PMV BLD AUTO: 9.2 FL (ref 5–10.5)
RBC # BLD: 3.98 M/UL (ref 4–5.2)
SEDIMENTATION RATE, ERYTHROCYTE: 96 MM/HR (ref 0–30)
TOTAL PROTEIN: 7.5 G/DL (ref 6.4–8.2)
URIC ACID, SERUM: 4.5 MG/DL (ref 2.6–6)
WBC # BLD: 9.9 K/UL (ref 4–11)

## 2020-09-30 PROCEDURE — 86140 C-REACTIVE PROTEIN: CPT

## 2020-09-30 PROCEDURE — 84550 ASSAY OF BLOOD/URIC ACID: CPT

## 2020-09-30 PROCEDURE — 85025 COMPLETE CBC W/AUTO DIFF WBC: CPT

## 2020-09-30 PROCEDURE — 80076 HEPATIC FUNCTION PANEL: CPT

## 2020-09-30 PROCEDURE — 36415 COLL VENOUS BLD VENIPUNCTURE: CPT

## 2020-09-30 PROCEDURE — 85652 RBC SED RATE AUTOMATED: CPT

## 2020-09-30 PROCEDURE — 82565 ASSAY OF CREATININE: CPT

## 2020-10-01 ENCOUNTER — OFFICE VISIT (OUTPATIENT)
Dept: PULMONOLOGY | Age: 50
End: 2020-10-01
Payer: COMMERCIAL

## 2020-10-01 VITALS
RESPIRATION RATE: 18 BRPM | BODY MASS INDEX: 31.58 KG/M2 | WEIGHT: 185 LBS | SYSTOLIC BLOOD PRESSURE: 122 MMHG | HEIGHT: 64 IN | DIASTOLIC BLOOD PRESSURE: 75 MMHG | OXYGEN SATURATION: 91 % | HEART RATE: 78 BPM

## 2020-10-01 LAB — C-REACTIVE PROTEIN: 36.9 MG/L (ref 0–5.1)

## 2020-10-01 PROCEDURE — 99214 OFFICE O/P EST MOD 30 MIN: CPT | Performed by: INTERNAL MEDICINE

## 2020-10-01 PROCEDURE — G8417 CALC BMI ABV UP PARAM F/U: HCPCS | Performed by: INTERNAL MEDICINE

## 2020-10-01 PROCEDURE — G8484 FLU IMMUNIZE NO ADMIN: HCPCS | Performed by: INTERNAL MEDICINE

## 2020-10-01 PROCEDURE — 1036F TOBACCO NON-USER: CPT | Performed by: INTERNAL MEDICINE

## 2020-10-01 PROCEDURE — 3017F COLORECTAL CA SCREEN DOC REV: CPT | Performed by: INTERNAL MEDICINE

## 2020-10-01 PROCEDURE — G8427 DOCREV CUR MEDS BY ELIG CLIN: HCPCS | Performed by: INTERNAL MEDICINE

## 2020-10-01 PROCEDURE — G9899 SCRN MAM PERF RSLTS DOC: HCPCS | Performed by: INTERNAL MEDICINE

## 2020-10-01 NOTE — PROGRESS NOTES
PULMONARY OFFICE FOLLOW UP NOTE    REASON FOR VISIT:   Chief Complaint   Patient presents with    Follow-Up from [address] [place] [address] - pt was in hospital for Pneumonia in March       DATE OF VISIT: 10/1/2020    HISTORY OF PRESENT ILLNESS: 45y.o. year old female for follow-up of asthma, chronic hypoxic respiratory failure on home oxygen at 2 L/min, heart failure with preserved ejection fraction, obstructive sleep apnea not on CPAP. she has PMH of history of breast cancer, depression, hypothyroidism, diabetes mellitus type 2.     -Patient today states that she has been having more shortness of breath for the past 1 week. She has gained weight. Her baseline weight is close to 180 pounds. Today is she is wearing 185 pounds. She also has several bilateral lower extremity edema, right more than left. She is wearing oxygen continuously now. Normally she wears oxygen as needed for exertion during the day and to sleep at night. For heart failure, she takes torsemide 100 mg in the morning and 50 mg in the evening along with Aldactone 100 mg daily. She also takes Zaroxolyn 2.5 mg 5 times per week in 1 week and 4 times per week the next week. She follows up with Dr. Maco Sharma for congestive heart failure. Patient is stating that she will follow-up with Dr. Maco Sharma today for worsening heart failure.    -Her bronchodilator regimen for asthma consist of Dulera 2 puffs twice daily along with albuterol inhaler albuterol nebs as needed. She also takes Singulair 10 mg daily.    -Patient was diagnosed with obstructive sleep apnea many years ago. She had 2 sleep studies, 110 years ago and 1 close to 5 years ago. Her last sleep study was performed at [facility]. Her  states that she has 2's CPAP machines at home. She could not get used to the CPAP and stopped wearing them. She only wears oxygen to sleep at night.    -Patient was hospitalized in March 2024 pneumonia and fluid overload.     Diagnostic test reviewed:  I reviewed the chest x-ray from 3/27/2020 and my interpretation is as follows. Increased pulmonary vascular congestion. I reviewed the PFT from 5/10/2018 and my interpretation is as follows. restrictive lung disease with reduced diffusion capacity. Echocardiogram from 9/20/2020 showed preserved EF of 55 to 60%      REVIEW OF SYSTEMS: 14 point ROS is negative beside mentioned in [place]. CONSTITUTIONAL SYMPTOMS: The patient denies fever, fatigue, night sweats, weight loss or weight gain. HEENT: No vision changes. No tinnitus, Denies sinus pain. No hoarseness, or dysphagia. NECK: Patient denies swelling in the neck. CARDIOVASCULAR: Denies chest pain, palpitation, syncope. RESPIRATORY: See above. GASTROINTESTINAL: Denies nausea, abdominal pain or change in bowel function. GENITOURINARY: Denies obstructive symptoms. No history of incontinence. BREASTS: No masses or lumps in the breasts. SKIN: No rashes or itching. MUSCULOSKELETAL: Denies weakness or bone pain. NEUROLOGICAL: No headaches or seizures. PSYCHIATRIC: Denies mood swings or depression. ENDOCRINE: Denies heat or cold intolerance or excessive thirst.  HEMATOLOGIC/LYMPHATIC: Denies easy bruising or lymph node swelling. ALLERGIC/IMMUNOLOGIC: No environmental allergies.     PAST MEDICAL HISTORY:   Past Medical History:   Diagnosis Date    Anxiety     Anxiety and depression     Arthritis     not sure of specific type    Asthma     CAD (coronary artery disease)     Cancer ([address] 2007    left breast    Cerebral artery occlusion with cerebral infarction ([address]     CHF (congestive heart failure) ([address] 2018    Chronic kidney disease     renal insufficency/to see Dr John Mckeon    Chronic pain     Constipation     COPD (chronic obstructive pulmonary disease) ([address]     Depression     Diabetes mellitus ([address]     Diabetic polyneuropathy associated with type 2 diabetes mellitus ([address] 2/2/2018    Dysthymia 2018    ESBL (extended spectrum beta-lactamase) producing bacteria infection 2018    urine    Fibromyalgia     Gastric ulcer, unspecified as acute or chronic, without mention of hemorrhage, perforation, or obstruction     GERD (gastroesophageal reflux disease)     Gout     HIGH CHOLESTEROL     Hypertension     Hypothyroidism     Severe persistent asthma without complication 8246    Thyroid disease     TIA (transient ischemic attack)     with occasional left leg and hand weakness       PAST SURGICAL HISTORY:   Past Surgical History:   Procedure Laterality Date    BREAST SURGERY      left mastectomy    CARPAL TUNNEL RELEASE      Bilateral    FINGER CONTRACTURE SURGERY      HYSTERECTOMY  2005    USO    TONSILLECTOMY      UPPER GASTROINTESTINAL ENDOSCOPY  2019    stretched esophagous        SOCIAL HISTORY:   Social History     Tobacco Use    Smoking status: Never Smoker    Smokeless tobacco: Never Used   Substance Use Topics    Alcohol use: No    Drug use: No       FAMILY HISTORY:   Family History   Problem Relation Age of Onset    Asthma Other     Cancer Other     Depression Other     Diabetes Other     Hypertension Other     High Cholesterol Other     Migraines Other     Heart Attack Father 72         of MI    High Blood Pressure Mother     Diabetes Mother        MEDICATIONS:     Current Outpatient Medications on File Prior to Visit   Medication Sig Dispense Refill    tiZANidine (ZANAFLEX) 2 MG tablet TAKE 1 TABLET BY MOUTH EVERY EIGHT HOURS AS NEEDED FOR PAIN AND SPASM 30 tablet 0    nystatin (MYCOSTATIN) 417655 UNIT/ML suspension Take 5 mLs by mouth 4 times daily 500 mL 1    Magic Mouthwash (MIRACLE MOUTHWASH) Swish and spit 5 mLs 4 times daily as needed for Irritation or Pain 300 mL 2    senna-docusate (STOOL SOFTENER/LAXATIVE) 8.6-50 MG per tablet TAKE 2 TABLETS BY MOUTH TWO TIMES A  tablet 0    B-D UF III MINI PEN NEEDLES 31G X 5 MM MISC use five times daily with insulin 100 each 0    omeprazole (PRILOSEC) 20 MG delayed release capsule TAKE 1 CAPSULE BY MOUTH TWO TIMES A DAY  60 capsule 0    spironolactone (ALDACTONE) 50 MG tablet TAKE 2 TABLETS BY MOUTH IN THE MORNING and 1 tablet in the evening 90 tablet 0    montelukast (SINGULAIR) 10 MG tablet TAKE 1 TABLET BY MOUTH ONE TIME A DAY  30 tablet 3    loratadine (CLARITIN) 10 MG tablet TAKE 1 TABLET BY MOUTH ONE TIME A DAY  30 tablet 0    ferrous sulfate (IRON 325) 325 (65 Fe) MG tablet TAKE 1 TABLET BY MOUTH TWO TIMES A DAY WITH MEALS 180 tablet 0    lidocaine viscous hcl (XYLOCAINE) 2 % SOLN solution Take 5 mLs by mouth every 3 hours as needed for Irritation or Pain 100 mL 2    ketoconazole (NIZORAL) 2 % shampoo Apply topically daily as needed. 120 mL 1    Insulin Degludec (TRESIBA FLEXTOUCH) 200 UNIT/ML SOPN inject 160 units subcutaneously once daily 16 pen 3    NOVOLOG FLEXPEN 100 UNIT/ML injection pen INJECT 30 TO 50 UNITS INTO THE SKIN THREE TIMES DAILY BEFORE MEALS 30 mL 3    metOLazone (ZAROXOLYN) 2.5 MG tablet TAKE 1 TABLET BY MOUTH TWO TIMES A WEEK AS NEEDED for weight over 180 pounds. do not take two days in a row 60 tablet 0    blood glucose test strips (FREESTYLE LITE) strip test blood sugar four times daily 200 strip 5    nitroGLYCERIN (NITROSTAT) 0.4 MG SL tablet Place 1 tablet under the tongue every 5 minutes as needed for Chest pain up to max of 3 total doses. If no relief after 1 dose, call 911. 25 tablet 3    vitamin D3 (CHOLECALCIFEROL) 25 MCG (1000 UT) TABS tablet TAKE 3 TABLETS BY MOUTH ONE TIME A DAY 90 tablet 5    levothyroxine (SYNTHROID) 150 MCG tablet Take 1 tablet by mouth every morning (before breakfast) 90 tablet 3    Ertugliflozin L-PyroglutamicAc (STEGLATRO) 5 MG TABS Take 1 tablet by mouth one time daily. 30 tablet 2    diazePAM (VALIUM) 5 MG tablet Take 5 mg by mouth every 8 hours as needed for Anxiety.       cariprazine hcl (VRAYLAR) 1.5 MG capsule Take 1.5 mg by mouth daily      butalbital-acetaminophen-caffeine (FIORICET, ESGIC) -40 MG per tablet Take 1 tablet by mouth every 6 hours as needed for Headaches 30 tablet 1    Febuxostat 80 MG TABS Take 80 mg by mouth daily      glipiZIDE (GLUCOTROL XL) 10 MG extended release tablet Take 10 mg by mouth 2 times daily      pregabalin (LYRICA) 50 MG capsule Take 50 mg by mouth 2 times daily.  lubiprostone (AMITIZA) 24 MCG capsule Take 24 mcg by mouth daily (with breakfast)      albuterol (PROVENTIL) (2.5 MG/3ML) 0.083% nebulizer solution Take 3 mLs by nebulization every 6 hours as needed for Wheezing 120 each 3    torsemide (DEMADEX) 100 MG tablet TAKE 1 TABLET BY MOUTH IN THE MORNING AND 1/2 TABLET IN THE EVENING  135 tablet 3    ranolazine (RANEXA) 500 MG extended release tablet TAKE 1 TABLET BY MOUTH TWO TIMES A DAY  180 tablet 3    Glucagon 1 MG/0.2ML SOSY To be used in case of severe hypoglycemia 2 Syringe 2    leflunomide (ARAVA) 10 MG tablet Take 5 mg by mouth daily       metoprolol tartrate (LOPRESSOR) 25 MG tablet Take 1 tablet by mouth 2 times daily 180 tablet 3    simvastatin (ZOCOR) 20 MG tablet TAKE 1 TABLET BY MOUTH nightly 90 tablet 3    albuterol sulfate  (90 Base) MCG/ACT inhaler Inhale 2 puffs into the lungs every 6 hours as needed for Wheezing 1 Inhaler 6    OXYGEN Inhale 3 L into the lungs nightly Sometimes with activity      oxyCODONE (OXYCONTIN) 20 MG extended release tablet Take 20 mg by mouth every 12 hours.       aspirin 81 MG EC tablet Take 81 mg by mouth daily      sulfaSALAzine (AZULFIDINE) 500 MG tablet Take 1,000 mg by mouth 2 times daily       calcium carbonate-vitamin D (CALCIUM 600+D) 600-200 MG-UNIT TABS Take 1 tablet by mouth 2 times daily      benztropine (COGENTIN) 1 MG tablet Take 1 mg by mouth nightly       folic acid (FOLVITE) 1 MG tablet Take 1 mg by mouth daily      oxyCODONE-acetaminophen (PERCOCET)  MG per tablet Take 1 tablet by mouth every 4 hours as needed for Pain . Earliest Fill Date: 6/8/17 100 tablet 0    duloxetine (CYMBALTA) 60 MG capsule Take 60 mg by mouth 2 times daily. No current facility-administered medications on file prior to visit. ALLERGIES:   Allergies as of 10/01/2020 - Review Complete 10/01/2020   Allergen Reaction Noted    Iv dye [iodides] Anaphylaxis 04/20/2011    Diazepam Other (See Comments) 04/05/2017    Trazodone and nefazodone Other (See Comments) 09/01/2011      OBJECTIVE:   height is 5' 4\" (1.626 m) and weight is 185 lb (83.9 kg). Her blood pressure is 122/75 and her pulse is 78. Her respiration is 18 and oxygen saturation is 91%. PHYSICAL EXAM:    CONSTITUTIONAL: She is a 45y.o.-year-old who appears her stated age. She is alert and oriented x 3 and in no acute distress. HEENT: PERRL. No scleral icterus. No thrush, atraumatic, normocephalic. NECK: Supple, without cervical or supraclavicular lymphadenopathy:  CARDIOVASCULAR: S1 S2 RRR. Without murmer  RESPIRATORY & CHEST: Lungs are clear to auscultation and percussion. No wheezing, no crackles. Good air movement  GASTROINTESTINAL & ABDOMEN: Soft, nontender, positive bowel sounds in all quadrants, non-distended, without hepatosplenomegaly. GENITOURINARY: Deferred. MUSCULOSKELETAL: No tenderness to palpation of the axial skeleton. There is no clubbing. No cyanosis. Bilateral edema of the lower extremities. SKIN OF BODY: No rash or jaundice. PSYCHIATRIC EVALUATION: Normal affect. Patient answers questions appropriately. HEMATOLOGIC/LYMPHATIC/ IMMUNOLOGIC: No palpable lymphadenopathy. NEUROLOGIC: Alert and oriented x 3. Groslly non-focal. Motor strength is 5+/5 in all muscle groups. The patient has a normal sensorium globally. ASSESSMENT AND PLAN:     1. LUIS (obstructive sleep apnea)  Patient was diagnosed with obstructive sleep apnea many years ago. However, she could not get used to CPAP and stopped using it.   She is only wearing oxygen to sleep at night. I explained to her about the direct correlation of uncontrolled heart failure with repeated exacerbations and uncontrolled sleep apnea. I explained to her the importance of controlling sleep apnea with CPAP machine. Patient is willing to try CPAP again. Unfortunately, we will have to order a repeat sleep study as her last sleep study was at least 5 years ago. I will order a baseline diagnostic sleep study which we performed in the sleep lab. I have explained to the patient that patient should be euvolemic with weight of less than 180 pounds before she schedules a sleep study.  - Baseline Diagnostic Sleep Study; Future    2. Chronic hypoxic respiratory failure  Likely secondary to heart failure and uncontrolled sleep apnea. There might be some component of obesity hypoventilation syndrome. Continue oxygen at 2 L/min. 3. Acute on chronic heart failure with preserved ejection fraction [facility])  Patient currently is volume overloaded. Her weight has increased with worsening shortness of breath and worsening leg edema. She would follow-up with Dr. Chana Lima today for worsening heart failure. Patient is on torsemide, Aldactone and Zaroxolyn. 4. Mild persistent asthma without complication  Continue Dulera 2 puffs twice daily. Continue albuterol as needed. Continue Singulair 10 mg daily. Return in about 8 weeks (around 11/26/2020). Dahlia Abbasi MD  Pulmonary Critical Care and Sleep Medicine  Electronically signed by Dahlia Abbasi MD on 10/1/2020 at 4:20 PM     This note was completed using dragon medical speech recognition software. Grammatical errors, random word insertions, pronoun errors and incomplete sentences are occasional consequences of this technology due to software limitations.  If there are questions or concerns about the content of this note of information contained within the body of this dictation they should be addressed with the provider for

## 2020-10-05 ENCOUNTER — TELEPHONE (OUTPATIENT)
Dept: INTERNAL MEDICINE CLINIC | Age: 50
End: 2020-10-05

## 2020-10-05 RX ORDER — SIMVASTATIN 20 MG
20 TABLET ORAL NIGHTLY
Qty: 90 TABLET | Refills: 0 | Status: SHIPPED | OUTPATIENT
Start: 2020-10-05 | End: 2021-01-05

## 2020-10-05 NOTE — TELEPHONE ENCOUNTER
----- Message from Liang Bright sent at 10/5/2020  3:01 PM EDT -----  Subject: Message to Provider    QUESTIONS  Information for Provider? Patient wants to know if she can get a Flu shot   with being high risk   ?/ IE heart problems   asthma and on oxygen  ---------------------------------------------------------------------------  --------------  CALL BACK INFO  What is the best way for the office to contact you? OK to leave message on   voicemail  Preferred Call Back Phone Number? [phone]  ---------------------------------------------------------------------------  --------------  SCRIPT ANSWERS  Relationship to Patient? Other  Representative Name? Aisha Holes   Is the Representative on the appropriate HIPAA document in Epic?  Yes

## 2020-10-06 NOTE — PROGRESS NOTES
[address]  Advanced CHF/Pulmonary Hypertension   Cardiac Follow up       Pako Fairbanks  YOB: 1970    Date of Visit:  10/7/20    Chief Complaint   Patient presents with    Follow-up     4 month f/u    Congestive Heart Failure     History of Present Illness:  Ms. Morro Mccord is a  45 y.o. female with chronic dCHF, [facility] diffuse LAD disease and small vessel disease with normal RCA and LCX (treated medically).  Breast cancer 8+ years ago s/p mastectomy.  LUIS with cpap non compliant.     Her cardiac cath in 2014 showed diffuse LAD disease . She has had issues with fluid overload but multiple echos have shown normal EF and diastolic function. Cardiac MRI performed showed normal LVEF 61% and no evidence of constriction. She was hospitalized 1/12-1/19/18 CXR showed mild pulmonary edema, proBNP 277. VQ neg for PE and flu negative. She was treated for exacerbation of asthma and HFpEF. ~ten years ago she had radiation for breast cancer. She follows with endocrinology Dr. Pat El. She had a LHC/RHC  2/28/18 to r/o pericardial constriction as cause for her frequent episodes of fluid overload due to her history of radiation therapy to left breast. Also was looking for restriction that could cause fluid build up as well. No evidence found. Coronaries showed small vessel CAD due to diabetes. No constriction or restriction. PCWP is about 21 and normal.       Today she is here for regular follow up. She has chronic SOB. She is feeling weak, SOB, dizzy and having weakness in her left leg. She is very sleepy in the visit and has wheezes. She is lethargic. She is wearing oxygen at 2 liters. She saw Dr. Carlie Pelletier a few days ago and she was not wheezy then per her . Her  is here and he states she has a nebulizer at home that he will have her use when they get home.   She takes pain meds and took one Percocet before coming to the office and she also takes Oxycontin. She has constipation with pain meds and takes metamucil. Her CRP was elevated on last blood work. She has antibiotics for a tooth infection and is having difficulty eating. She has increased swelling in her left hand and arm and her right leg/foot.        Allergies   Allergen Reactions    Iv Dye [Iodides] Anaphylaxis     allergic to ct scan dye, not the MRI    Diazepam Other (See Comments)    Trazodone And Nefazodone Other (See Comments)     Makes patient feel very sluggish     Current Outpatient Medications   Medication Sig Dispense Refill    simvastatin (ZOCOR) 20 MG tablet TAKE 1 TABLET BY MOUTH NIGHTLY  90 tablet 0    metoprolol tartrate (LOPRESSOR) 25 MG tablet TAKE 1 TABLET BY MOUTH TWO TIMES A DAY  180 tablet 0    Ertugliflozin L-PyroglutamicAc (STEGLATRO) 5 MG TABS TAKE 1 TABLET BY MOUTH ONE TIME A DAY 30 tablet 2    spironolactone (ALDACTONE) 50 MG tablet TAKE 2 TABLETS BY MOUTH IN THE MORNING AND ONE TABLET IN THE EVENING 90 tablet 5    omeprazole (PRILOSEC) 20 MG delayed release capsule TAKE 1 CAPSULE BY MOUTH TWO TIMES A DAY  60 capsule 5    loratadine (CLARITIN) 10 MG tablet TAKE 1 TABLET BY MOUTH ONE TIME A DAY  30 tablet 5    tiZANidine (ZANAFLEX) 2 MG tablet TAKE 1 TABLET BY MOUTH EVERY EIGHT HOURS AS NEEDED FOR PAIN AND SPASM 30 tablet 0    nystatin (MYCOSTATIN) 246420 UNIT/ML suspension Take 5 mLs by mouth 4 times daily 500 mL 1    Magic Mouthwash (MIRACLE MOUTHWASH) Swish and spit 5 mLs 4 times daily as needed for Irritation or Pain 300 mL 2    senna-docusate (STOOL SOFTENER/LAXATIVE) 8.6-50 MG per tablet TAKE 2 TABLETS BY MOUTH TWO TIMES A  tablet 0    B-D UF III MINI PEN NEEDLES 31G X 5 MM MISC use five times daily with insulin 100 each 0    montelukast (SINGULAIR) 10 MG tablet TAKE 1 TABLET BY MOUTH ONE TIME A DAY  30 tablet 3    ferrous sulfate (IRON 325) 325 (65 Fe) MG tablet TAKE 1 TABLET BY MOUTH TWO TIMES A DAY WITH MEALS 180 tablet 0    lidocaine viscous hcl (XYLOCAINE) 2 % SOLN solution Take 5 mLs by mouth every 3 hours as needed for Irritation or Pain 100 mL 2    ketoconazole (NIZORAL) 2 % shampoo Apply topically daily as needed. 120 mL 1    Insulin Degludec (TRESIBA FLEXTOUCH) 200 UNIT/ML SOPN inject 160 units subcutaneously once daily 16 pen 3    NOVOLOG FLEXPEN 100 UNIT/ML injection pen INJECT 30 TO 50 UNITS INTO THE SKIN THREE TIMES DAILY BEFORE MEALS 30 mL 3    metOLazone (ZAROXOLYN) 2.5 MG tablet TAKE 1 TABLET BY MOUTH TWO TIMES A WEEK AS NEEDED for weight over 180 pounds. do not take two days in a row 60 tablet 0    blood glucose test strips (FREESTYLE LITE) strip test blood sugar four times daily 200 strip 5    nitroGLYCERIN (NITROSTAT) 0.4 MG SL tablet Place 1 tablet under the tongue every 5 minutes as needed for Chest pain up to max of 3 total doses. If no relief after 1 dose, call 911. 25 tablet 3    vitamin D3 (CHOLECALCIFEROL) 25 MCG (1000 UT) TABS tablet TAKE 3 TABLETS BY MOUTH ONE TIME A DAY 90 tablet 5    levothyroxine (SYNTHROID) 150 MCG tablet Take 1 tablet by mouth every morning (before breakfast) 90 tablet 3    diazePAM (VALIUM) 5 MG tablet Take 5 mg by mouth every 8 hours as needed for Anxiety.  cariprazine hcl (VRAYLAR) 1.5 MG capsule Take 1.5 mg by mouth daily      butalbital-acetaminophen-caffeine (FIORICET, ESGIC) -40 MG per tablet Take 1 tablet by mouth every 6 hours as needed for Headaches 30 tablet 1    Febuxostat 80 MG TABS Take 80 mg by mouth daily      glipiZIDE (GLUCOTROL XL) 10 MG extended release tablet Take 10 mg by mouth 2 times daily      pregabalin (LYRICA) 50 MG capsule Take 50 mg by mouth 2 times daily.       lubiprostone (AMITIZA) 24 MCG capsule Take 24 mcg by mouth daily (with breakfast)      albuterol (PROVENTIL) (2.5 MG/3ML) 0.083% nebulizer solution Take 3 mLs by nebulization every 6 hours as needed for Wheezing 120 each 3    torsemide (DEMADEX) 100 MG tablet TAKE 1 TABLET BY MOUTH IN THE MORNING AND 1/2 TABLET IN THE EVENING  135 tablet 3    ranolazine (RANEXA) 500 MG extended release tablet TAKE 1 TABLET BY MOUTH TWO TIMES A DAY  180 tablet 3    Glucagon 1 MG/0.2ML SOSY To be used in case of severe hypoglycemia 2 Syringe 2    leflunomide (ARAVA) 10 MG tablet Take 5 mg by mouth daily       albuterol sulfate  (90 Base) MCG/ACT inhaler Inhale 2 puffs into the lungs every 6 hours as needed for Wheezing 1 Inhaler 6    OXYGEN Inhale 3 L into the lungs nightly Sometimes with activity      oxyCODONE (OXYCONTIN) 20 MG extended release tablet Take 20 mg by mouth every 12 hours.  aspirin 81 MG EC tablet Take 81 mg by mouth daily      sulfaSALAzine (AZULFIDINE) 500 MG tablet Take 1,000 mg by mouth 2 times daily       calcium carbonate-vitamin D (CALCIUM 600+D) 600-200 MG-UNIT TABS Take 1 tablet by mouth 2 times daily      benztropine (COGENTIN) 1 MG tablet Take 1 mg by mouth nightly       folic acid (FOLVITE) 1 MG tablet Take 1 mg by mouth daily      oxyCODONE-acetaminophen (PERCOCET)  MG per tablet Take 1 tablet by mouth every 4 hours as needed for Pain . Earliest Fill Date: 6/8/17 100 tablet 0    duloxetine (CYMBALTA) 60 MG capsule Take 60 mg by mouth 2 times daily. No current facility-administered medications for this visit.       Past Medical History:   Diagnosis Date    Anxiety     Anxiety and depression     Arthritis     not sure of specific type    Asthma     CAD (coronary artery disease)     Cancer ([facility] 2007    left breast    Cerebral artery occlusion with cerebral infarction ([facility]     CHF (congestive heart failure) ([address] 2018    Chronic kidney disease     renal insufficency/to see Dr Heather Meyer    Chronic pain     Constipation     COPD (chronic obstructive pulmonary disease) ([address]     Depression     Diabetes mellitus ([address]     Diabetic polyneuropathy associated with type 2 diabetes mellitus ([address] 2/2/2018    Dysthymia 2/2/2018    ESBL (extended spectrum beta-lactamase) producing bacteria infection 2018    urine    Fibromyalgia     Gastric ulcer, unspecified as acute or chronic, without mention of hemorrhage, perforation, or obstruction     GERD (gastroesophageal reflux disease)     Gout     HIGH CHOLESTEROL     Hypertension     Hypothyroidism     Severe persistent asthma without complication 523    Thyroid disease     TIA (transient ischemic attack)     with occasional left leg and hand weakness     Past Surgical History:   Procedure Laterality Date    BREAST SURGERY      left mastectomy    CARPAL TUNNEL RELEASE      Bilateral    FINGER CONTRACTURE SURGERY      HYSTERECTOMY  2005    USO    TONSILLECTOMY      UPPER GASTROINTESTINAL ENDOSCOPY  2019    stretched esophagous      Family History   Problem Relation Age of Onset    Asthma Other     Cancer Other     Depression Other     Diabetes Other     Hypertension Other     High Cholesterol Other     Migraines Other     Heart Attack Father 72         of MI    High Blood Pressure Mother     Diabetes Mother      Social History     Socioeconomic History    Marital status:      Spouse name: Yogesh Longoria Number of children: 3    Years of education: Not on file    Highest education level: Not on file   Occupational History    Occupation: disabled   Social Needs    Financial resource strain: Not on file    Food insecurity     Worry: Not on file     Inability: Not on file   [employer] needs     Medical: Not on file     Non-medical: Not on file   Tobacco Use    Smoking status: Never Smoker    Smokeless tobacco: Never Used   Substance and Sexual Activity    Alcohol use: No    Drug use: No    Sexual activity: Not on file   Lifestyle    Physical activity     Days per week: Not on file     Minutes per session: Not on file    Stress: Not on file   Relationships    Social connections     Talks on phone: Not on file     Gets together: Not on file Attends Judaism service: Not on file     Active member of club or organization: Not on file     Attends meetings of clubs or organizations: Not on file     Relationship status: Not on file    Intimate partner violence     Fear of current or ex partner: Not on file     Emotionally abused: Not on file     Physically abused: Not on file     Forced sexual activity: Not on file   Other Topics Concern    Not on file   Social History Narrative    Not on file     Review of Systems:   · Constitutional: there has been no unanticipated weight loss. There's been no change in energy level, sleep pattern, or activity level. · Eyes: No visual changes or diplopia. No scleral icterus. · ENT: No Headaches, hearing loss or vertigo. No mouth sores or sore throat. · Cardiovascular: Reviewed in HPI  · Respiratory: No cough or wheezing, no sputum production. No hematemesis. · Gastrointestinal: No abdominal pain, appetite loss, blood in stools. No change in bowel or bladder habits. · Genitourinary: No dysuria, trouble voiding, or hematuria. · Musculoskeletal:  No gait disturbance, weakness or joint complaints. · Integumentary: No rash or pruritis. · Neurological: No headache, diplopia, change in muscle strength, numbness or tingling. No change in gait, balance, coordination, mood, affect, memory, mentation, behavior. · Psychiatric: No anxiety, no depression. · Endocrine: No malaise, fatigue or temperature intolerance. No excessive thirst, fluid intake, or urination. No tremor. · Hematologic/Lymphatic: No abnormal bruising or bleeding, blood clots or swollen lymph nodes. · Allergic/Immunologic: No nasal congestion or hives.     Physical Examination:    /70 (Site: Right Upper Arm, Position: Sitting, Cuff Size: Large Adult)   Pulse 84   Ht 5' (1.524 m)   Wt 187 lb (84.8 kg)   SpO2 94%   BMI 36.52 kg/m²       Wt Readings from Last 3 Encounters:   10/07/20 187 lb (84.8 kg)   10/01/20 185 lb (83.9 kg) 07/09/20 185 lb (83.9 kg)     BP Readings from Last 3 Encounters:   10/07/20 116/70   10/01/20 122/75   07/09/20 128/76     Constitutional and General Appearance:   WD/WN, less swollen than usual  HEENT:  NC/AT  XANDER  No problems with hearing  Skin:  Warm, dry  Respiratory:  · Normal excursion and expansion without use of accessory muscles  · Resp Auscultation: Normal breath sounds without dullness  Cardiovascular:  · The apical impulses not displaced  · Heart tones are crisp and normal  · Cervical veins are not engorged  · The carotid upstroke is normal in amplitude and contour without delay or bruit  · JVP 8-9 cm H2O  RRR with nl S1 and S2 without m,r,g  · Peripheral pulses are symmetrical and full  · There is no clubbing, cyanosis of the extremities. Bilateral  hand edema is increased 3+. Fingers very swollen. L>R  · bilateral trace leg edema. R>L  · Femoral Arteries: 2+ and equal  · Pedal Pulses: 2+ and equal   Neck:  · No thyromegaly  Abdomen:  abdominal girth soft  · No masses or tenderness  · Liver/Spleen: No Abnormalities Noted  Neurological/Psychiatric:  · Alert and oriented in all spheres  · Moves all extremities well  · Exhibits normal gait balance and coordination  · No abnormalities of mood, affect, memory, mentation, or behavior are noted    Diagnostics:    Stress Test 7-1-2020  Bret Garvey is normal isotope uptake at stress and rest. There is no evidence of     myocardial ischemia or scar.     Normal LV function.     Left ventricular ejection fraction of 59 %.     Overall findings represent a low risk scan.         Stress Protocols          Resting ECG     Normal sinus rhythm.         Left Heart Cath 2/27/18:  Dominance : Right     LM: bifurcating, MLI  LAD: mild plaquing with small vessel disease distally   LCx: no significant disease  RCA: MLI     LVEDP: 25 mmHg   No Ao gradient     Right Heart Cath   RA: 13  RV: 47/6/16  PA:   46/19    and mean of 32  PCWP: 21 mmHg      Sats:  Ao: 92%  RA: 61%  PA: 62% (x 2)   CO/CI : 6.1 L    CXR 1/15/18:  No acute cardiopulmonary disease     Echo 11/8/2017:  Normal left ventricle size and systolic function with an estimated ejection   fraction of 60%. No regional wall motion abnormalities are seen.  Ghazala Vaca is borderline concentric left ventricular hypertrophy.   E/e\"= 13     Stress test 11/8/2017: There is normal isotope uptake at stress and rest. There is no evidence of  myocardial ischemia or scar.  Normal LV function.  Overall findings represent a low risk scan.       VQ scan negative 1/11/2018    CXR 1/16/18  No acute cardiopulmonary disease     Echo 11/8/2017:  Normal left ventricle size and systolic function with an estimated ejection fraction of 60%. No regional wall motion abnormalities are seen. There is borderline concentric left ventricular hypertrophy.   E/e\"= 13     Stress test 11/8/2017: There is normal isotope uptake at stress and rest. There is no evidence of  myocardial ischemia or scar.  Normal LV function.  Overall findings represent a low risk scan.       VQ scan 1/11/2018:  Normal study.  No evidence of pulmonary embolus. Labs were reviewed including labs from other hospital systems through [facility]. Cardiac testing was reviewed including echos, nuclear scans, cardiac catheterization, including from other hospital systems through [facility].     Assessment:  1. Chronic heart failure with preserved ejection fraction ([address]    2. Coronary artery disease involving native coronary artery of native heart without angina pectoris    3. Renal insufficiency    4. SOB (shortness of breath)    5. Essential hypertension        1. Chronic heart failure with preserved ejection fraction ([address] :  Partially compensated. ProBNP 6/11/20> 125, 6/15/20> 132  Keep weight at 175-180 pounds: Torsemide 100mg in the am and 50mg in the evening. Less than 174 pounds, take torsemide 50 mg twice daily.     Continue Metolazone 3 times in a 2 week interval.  Watch weight and if weight is staying down, only take it once a week. -Weight 187 and stable at home. 2. Coronary artery disease involving native coronary artery of native heart without angina pectoris    3. Renal insufficiency:  Stable at 1.3-1.4.       4. SOB (shortness of breath):  Chronic SOB. --Continue Spironolactone and Torsemide.    -[facility] 5. Essential hypertension: /70 (Site: Right Upper Arm, Position: Sitting, Cuff Size: Large Adult)   Pulse 84   Ht 5' (1.524 m)   Wt 187 lb (84.8 kg)   SpO2 94%   BMI 36.52 kg/m²   -Stable. Lymphedema of upper extremity following lymphadenectomy:  L>R.     -++ chronic edema to left hand and left arm. Try to keep it elevated. Insurance will not cover automatic lymph machine. LUIS (obstructive sleep apnea): Uses CPAP therapy. CAD with angina pectoris:  Chronic chest pain usually relieved with 1-2 NTG. [facility] 2014 with diffuse LAD disease and small vessel disease. Hyperlipidemia LDL goal <70:  2/2019> . Not at goal.  Continue Zocor. Rheumatoid arthritis involving multiple sites:  She is on chronic pain meds. Plan:  All cardiac test and lab results personally reviewed by me during this office visit. 1.  Use a Metolazone 2.5mg tomorrow. 2.  Keep all medications the same. 3.  Continue Lab work. [facility]. Need to extend visits. 4.  VV for next office visit in 4 months. Scribe's attestation: This note was scribed in the presence of Monique Perez M.D. by Leonid Rodriguez RN     The scribe's documentation has been prepared under my direction and personally reviewed by me in its entirety. I confirm that the note above accurately reflects all work, treatment, procedures, and medical decision making performed by me. Continue Metolazone 3 times in a 2 week interval.  Watch weight and if weight is staying down, only take it once a week. QUALITY MEASURES  1.  Tobacco Cessation Counseling: NA  2. Retake of BP if >140/90: NA  3. Documentation to PCP/referring for new patient:  Sent to PCP at close of office visit. Yes  4. CAD patient on anti-platelet:   5. CAD patient on STATIN therapy:    6.  Patient with CHF and aFib on anticoagulation                                                          :      Josse Vilchis MD [facility]

## 2020-10-07 ENCOUNTER — TELEPHONE (OUTPATIENT)
Dept: INTERNAL MEDICINE CLINIC | Age: 50
End: 2020-10-07

## 2020-10-07 ENCOUNTER — TELEPHONE (OUTPATIENT)
Dept: CARDIOLOGY CLINIC | Age: 50
End: 2020-10-07

## 2020-10-07 ENCOUNTER — HOSPITAL ENCOUNTER (OUTPATIENT)
Age: 50
Setting detail: SPECIMEN
Discharge: HOME | End: 2020-10-07
Payer: COMMERCIAL

## 2020-10-07 ENCOUNTER — OFFICE VISIT (OUTPATIENT)
Dept: CARDIOLOGY CLINIC | Age: 50
End: 2020-10-07
Payer: COMMERCIAL

## 2020-10-07 VITALS
SYSTOLIC BLOOD PRESSURE: 116 MMHG | BODY MASS INDEX: 36.71 KG/M2 | HEIGHT: 60 IN | DIASTOLIC BLOOD PRESSURE: 70 MMHG | HEART RATE: 84 BPM | WEIGHT: 187 LBS | OXYGEN SATURATION: 94 %

## 2020-10-07 LAB
ANION GAP SERPL CALCULATED.3IONS-SCNC: 14 MMOL/L (ref 3–16)
BUN BLDV-MCNC: 36 MG/DL (ref 7–20)
CALCIUM SERPL-MCNC: 9.9 MG/DL (ref 8.3–10.6)
CHLORIDE BLD-SCNC: 94 MMOL/L (ref 99–110)
CO2: 30 MMOL/L (ref 21–32)
CREAT SERPL-MCNC: 1.3 MG/DL (ref 0.6–1.1)
GFR AFRICAN AMERICAN: 52
GFR NON-AFRICAN AMERICAN: 43
GLUCOSE BLD-MCNC: 372 MG/DL (ref 70–99)
POTASSIUM SERPL-SCNC: 4.5 MMOL/L (ref 3.5–5.1)
PRO-BNP: 175 PG/ML (ref 0–124)
SODIUM BLD-SCNC: 138 MMOL/L (ref 136–145)

## 2020-10-07 PROCEDURE — 83880 ASSAY OF NATRIURETIC PEPTIDE: CPT

## 2020-10-07 PROCEDURE — G8417 CALC BMI ABV UP PARAM F/U: HCPCS | Performed by: INTERNAL MEDICINE

## 2020-10-07 PROCEDURE — G8427 DOCREV CUR MEDS BY ELIG CLIN: HCPCS | Performed by: INTERNAL MEDICINE

## 2020-10-07 PROCEDURE — G9899 SCRN MAM PERF RSLTS DOC: HCPCS | Performed by: INTERNAL MEDICINE

## 2020-10-07 PROCEDURE — 1036F TOBACCO NON-USER: CPT | Performed by: INTERNAL MEDICINE

## 2020-10-07 PROCEDURE — 36415 COLL VENOUS BLD VENIPUNCTURE: CPT

## 2020-10-07 PROCEDURE — 80048 BASIC METABOLIC PNL TOTAL CA: CPT

## 2020-10-07 PROCEDURE — 99214 OFFICE O/P EST MOD 30 MIN: CPT | Performed by: INTERNAL MEDICINE

## 2020-10-07 PROCEDURE — G8484 FLU IMMUNIZE NO ADMIN: HCPCS | Performed by: INTERNAL MEDICINE

## 2020-10-07 PROCEDURE — 3017F COLORECTAL CA SCREEN DOC REV: CPT | Performed by: INTERNAL MEDICINE

## 2020-10-07 NOTE — PATIENT INSTRUCTIONS
Plan:  All cardiac test and lab results personally reviewed by me during this office visit. 1.  Use a Metolazone 2.5mg tomorrow. 2.  Keep all medications the same. 3.  Continue Lab work. 4.  VV for next office visit in 4 months. Continue Metolazone 3 times in a 2 week interval.  Watch weight and if weight is staying down, only take it once a week.

## 2020-10-07 NOTE — TELEPHONE ENCOUNTER
----- Message from [address] sent at 10/7/2020  4:32 PM EDT -----  Subject: Message to Provider    QUESTIONS  Information for Provider? patient needs to continue the visits from the   [facility]) Nurses   please contact insurance to ensure the at home visits continue   please contact colten Mae in regards   ---------------------------------------------------------------------------  --------------  [address]  What is the best way for the office to contact you? OK to leave message on   voicemail  Preferred Call Back Phone Number? [phone]  ---------------------------------------------------------------------------  --------------  SCRIPT ANSWERS  Relationship to Patient? Other  Representative Name? Aislinn Mae  Is the Representative on the appropriate HIPAA document in Epic?  Yes

## 2020-10-08 NOTE — TELEPHONE ENCOUNTER
Can you call [facility] and find out what the situation is? [facility] has kept her out of the hospital, which has been great. Would love to continue if we can, but I am doubting that a phone call from me will matter.   ARETHA

## 2020-10-09 ENCOUNTER — TELEPHONE (OUTPATIENT)
Dept: CARDIOLOGY CLINIC | Age: 50
End: 2020-10-09

## 2020-10-12 ENCOUNTER — TELEPHONE (OUTPATIENT)
Dept: CARDIOLOGY CLINIC | Age: 50
End: 2020-10-12

## 2020-10-12 NOTE — TELEPHONE ENCOUNTER
----- Message from Jason Coronado MD sent at 10/8/2020  6:01 PM EDT -----  Call patient. Labs look good. No changes.   Jason Coronado

## 2020-10-13 NOTE — TELEPHONE ENCOUNTER
Spoke to Leda Galindo, RN intake with [facility], [phone]. Asked to have the home care extended for this pt. She agreed, and will re-cert her. Dr. Edilberto Reese, are there any labs that need done at this time? If so, they need to be faxed to [phone].

## 2020-10-14 NOTE — TELEPHONE ENCOUNTER
I spoke with Lorna Zazueta at [facility]. I relayed message from Ventura Mora. She asked if there is an expiration on the orders?

## 2020-10-15 ENCOUNTER — TELEPHONE (OUTPATIENT)
Dept: CARDIOLOGY CLINIC | Age: 50
End: 2020-10-15

## 2020-10-15 NOTE — TELEPHONE ENCOUNTER
[facility] calling they have left multiple msg for pt and they drove to the house with no answer. They were to get a BMP and BNP today, they will try to get go again tomorrow to get this.  Pls call to advise Thank you

## 2020-10-20 NOTE — TELEPHONE ENCOUNTER
Left message for Neeraj Huertas at [facility]. Dr Melissa Ventura is okay with [facility] for the pt. She will sign the orders.

## 2020-10-21 ENCOUNTER — HOSPITAL ENCOUNTER (OUTPATIENT)
Dept: MRI IMAGING | Age: 50
Discharge: HOME | End: 2020-10-21
Payer: COMMERCIAL

## 2020-10-21 ENCOUNTER — HOSPITAL ENCOUNTER (OUTPATIENT)
Age: 50
Setting detail: SPECIMEN
Discharge: HOME | End: 2020-10-21
Payer: COMMERCIAL

## 2020-10-21 ENCOUNTER — HOSPITAL ENCOUNTER (OUTPATIENT)
Dept: VASCULAR LAB | Age: 50
Discharge: HOME | End: 2020-10-21
Payer: COMMERCIAL

## 2020-10-21 LAB
ANION GAP SERPL CALCULATED.3IONS-SCNC: 12 MMOL/L (ref 3–16)
BUN BLDV-MCNC: 31 MG/DL (ref 7–20)
CALCIUM SERPL-MCNC: 10 MG/DL (ref 8.3–10.6)
CHLORIDE BLD-SCNC: 93 MMOL/L (ref 99–110)
CO2: 30 MMOL/L (ref 21–32)
CREAT SERPL-MCNC: 1.3 MG/DL (ref 0.6–1.1)
GFR AFRICAN AMERICAN: 52
GFR NON-AFRICAN AMERICAN: 43
GLUCOSE BLD-MCNC: 105 MG/DL (ref 70–99)
POTASSIUM SERPL-SCNC: 4.6 MMOL/L (ref 3.5–5.1)
PRO-BNP: 125 PG/ML (ref 0–124)
SODIUM BLD-SCNC: 135 MMOL/L (ref 136–145)
URIC ACID, SERUM: 6 MG/DL (ref 2.6–6)

## 2020-10-21 PROCEDURE — A9577 INJ MULTIHANCE: HCPCS | Performed by: INTERNAL MEDICINE

## 2020-10-21 PROCEDURE — 6360000004 HC RX CONTRAST MEDICATION: Performed by: INTERNAL MEDICINE

## 2020-10-21 PROCEDURE — 36415 COLL VENOUS BLD VENIPUNCTURE: CPT

## 2020-10-21 PROCEDURE — 93971 EXTREMITY STUDY: CPT

## 2020-10-21 PROCEDURE — 77049 MRI BREAST C-+ W/CAD BI: CPT

## 2020-10-21 PROCEDURE — 80048 BASIC METABOLIC PNL TOTAL CA: CPT

## 2020-10-21 PROCEDURE — 84550 ASSAY OF BLOOD/URIC ACID: CPT

## 2020-10-21 PROCEDURE — 83880 ASSAY OF NATRIURETIC PEPTIDE: CPT

## 2020-10-21 RX ORDER — SODIUM CHLORIDE 9 MG/ML
INJECTION, SOLUTION INTRAVENOUS ONCE
Status: DISCONTINUED | OUTPATIENT
Start: 2020-10-21 | End: 2020-10-22 | Stop reason: HOSPADM

## 2020-10-21 RX ADMIN — GADOBENATE DIMEGLUMINE 17 ML: 529 INJECTION, SOLUTION INTRAVENOUS at 10:12

## 2020-10-22 ENCOUNTER — TELEPHONE (OUTPATIENT)
Dept: CARDIOLOGY CLINIC | Age: 50
End: 2020-10-22

## 2020-10-22 NOTE — TELEPHONE ENCOUNTER
Spoke with the patient's  regarding normal lab results per HIPPA. He will relay lab result to patient.

## 2020-10-22 NOTE — TELEPHONE ENCOUNTER
----- Message from Aura Vo MD sent at 10/22/2020 10:20 AM EDT -----  Please call patient. Labs look good. No changes.   ARETHA

## 2020-10-28 ENCOUNTER — HOSPITAL ENCOUNTER (INPATIENT)
Age: 50
LOS: 3 days | Discharge: HOME | DRG: 133 | End: 2020-10-31
Attending: EMERGENCY MEDICINE | Admitting: HOSPITALIST
Payer: COMMERCIAL

## 2020-10-28 ENCOUNTER — APPOINTMENT (OUTPATIENT)
Dept: GENERAL RADIOLOGY | Age: 50
DRG: 133 | End: 2020-10-28
Payer: COMMERCIAL

## 2020-10-28 PROBLEM — U07.1 ACUTE RESPIRATORY FAILURE DUE TO SEVERE ACUTE RESPIRATORY SYNDROME CORONAVIRUS 2 (SARS-COV-2) INFECTION (HCC): Status: ACTIVE | Noted: 2020-10-28

## 2020-10-28 PROBLEM — U07.1 ACUTE HYPOXEMIC RESPIRATORY FAILURE DUE TO SEVERE ACUTE RESPIRATORY SYNDROME CORONAVIRUS 2 (SARS-COV-2) DISEASE (HCC): Status: ACTIVE | Noted: 2020-10-28

## 2020-10-28 PROBLEM — J96.00 ACUTE RESPIRATORY FAILURE DUE TO SEVERE ACUTE RESPIRATORY SYNDROME CORONAVIRUS 2 (SARS-COV-2) INFECTION (HCC): Status: ACTIVE | Noted: 2020-10-28

## 2020-10-28 PROBLEM — J96.01 ACUTE HYPOXEMIC RESPIRATORY FAILURE DUE TO SEVERE ACUTE RESPIRATORY SYNDROME CORONAVIRUS 2 (SARS-COV-2) DISEASE (HCC): Status: ACTIVE | Noted: 2020-10-28

## 2020-10-28 LAB
ACETAMINOPHEN LEVEL: <5 UG/ML (ref 10–30)
ALBUMIN SERPL-MCNC: 4.5 G/DL (ref 3.4–5)
ALP BLD-CCNC: 111 U/L (ref 40–129)
ALT SERPL-CCNC: 19 U/L (ref 10–40)
AMMONIA: 20 UMOL/L (ref 11–51)
ANION GAP SERPL CALCULATED.3IONS-SCNC: 12 MMOL/L (ref 3–16)
APTT: 33.5 SEC (ref 24.2–36.2)
AST SERPL-CCNC: 36 U/L (ref 15–37)
BASE EXCESS VENOUS: 10.1 MMOL/L (ref -3–3)
BASOPHILS ABSOLUTE: 0.1 K/UL (ref 0–0.2)
BASOPHILS ABSOLUTE: 0.1 K/UL (ref 0–0.2)
BASOPHILS RELATIVE PERCENT: 0.5 %
BASOPHILS RELATIVE PERCENT: 0.6 %
BILIRUB SERPL-MCNC: <0.2 MG/DL (ref 0–1)
BILIRUBIN DIRECT: <0.2 MG/DL (ref 0–0.3)
BILIRUBIN, INDIRECT: ABNORMAL MG/DL (ref 0–1)
BUN BLDV-MCNC: 28 MG/DL (ref 7–20)
CALCIUM SERPL-MCNC: 10.2 MG/DL (ref 8.3–10.6)
CARBOXYHEMOGLOBIN: 0.7 % (ref 0–1.5)
CHLORIDE BLD-SCNC: 90 MMOL/L (ref 99–110)
CO2: 34 MMOL/L (ref 21–32)
CREAT SERPL-MCNC: 1.2 MG/DL (ref 0.6–1.1)
EOSINOPHILS ABSOLUTE: 0 K/UL (ref 0–0.6)
EOSINOPHILS ABSOLUTE: 0.1 K/UL (ref 0–0.6)
EOSINOPHILS RELATIVE PERCENT: 0.1 %
EOSINOPHILS RELATIVE PERCENT: 1.1 %
ETHANOL: NORMAL MG/DL (ref 0–0.08)
GFR AFRICAN AMERICAN: 57
GFR NON-AFRICAN AMERICAN: 47
GLUCOSE BLD-MCNC: 187 MG/DL (ref 70–99)
HCO3 VENOUS: 36.4 MMOL/L (ref 23–29)
HCT VFR BLD CALC: 39.6 % (ref 36–48)
HCT VFR BLD CALC: 40.2 % (ref 36–48)
HEMOGLOBIN, VEN, REDUCED: 6 %
HEMOGLOBIN: 13 G/DL (ref 12–16)
HEMOGLOBIN: 13.1 G/DL (ref 12–16)
INR BLD: 0.86 (ref 0.86–1.14)
LACTIC ACID: 1.3 MMOL/L (ref 0.4–2)
LIPASE: 18 U/L (ref 13–60)
LYMPHOCYTES ABSOLUTE: 1.3 K/UL (ref 1–5.1)
LYMPHOCYTES ABSOLUTE: 2.4 K/UL (ref 1–5.1)
LYMPHOCYTES RELATIVE PERCENT: 11.3 %
LYMPHOCYTES RELATIVE PERCENT: 21.6 %
MCH RBC QN AUTO: 28.3 PG (ref 26–34)
MCH RBC QN AUTO: 28.7 PG (ref 26–34)
MCHC RBC AUTO-ENTMCNC: 32.5 G/DL (ref 31–36)
MCHC RBC AUTO-ENTMCNC: 32.9 G/DL (ref 31–36)
MCV RBC AUTO: 87.1 FL (ref 80–100)
MCV RBC AUTO: 87.1 FL (ref 80–100)
METHEMOGLOBIN VENOUS: 0.4 %
MONOCYTES ABSOLUTE: 0.1 K/UL (ref 0–1.3)
MONOCYTES ABSOLUTE: 0.7 K/UL (ref 0–1.3)
MONOCYTES RELATIVE PERCENT: 0.8 %
MONOCYTES RELATIVE PERCENT: 6.6 %
NEUTROPHILS ABSOLUTE: 10.2 K/UL (ref 1.7–7.7)
NEUTROPHILS ABSOLUTE: 7.7 K/UL (ref 1.7–7.7)
NEUTROPHILS RELATIVE PERCENT: 70.1 %
NEUTROPHILS RELATIVE PERCENT: 87.3 %
O2 CONTENT, VEN: 18 VOL %
O2 SAT, VEN: 94 %
O2 THERAPY: ABNORMAL
PCO2, VEN: 54.9 MMHG (ref 40–50)
PDW BLD-RTO: 14.5 % (ref 12.4–15.4)
PDW BLD-RTO: 14.6 % (ref 12.4–15.4)
PH VENOUS: 7.43 (ref 7.35–7.45)
PLATELET # BLD: 270 K/UL (ref 135–450)
PLATELET # BLD: 282 K/UL (ref 135–450)
PMV BLD AUTO: 8.4 FL (ref 5–10.5)
PMV BLD AUTO: 8.5 FL (ref 5–10.5)
PO2, VEN: 93.5 MMHG (ref 25–40)
POTASSIUM REFLEX MAGNESIUM: 4.2 MMOL/L (ref 3.5–5.1)
PRO-BNP: 82 PG/ML (ref 0–124)
PROTHROMBIN TIME: 10 SEC (ref 10–13.2)
RBC # BLD: 4.55 M/UL (ref 4–5.2)
RBC # BLD: 4.62 M/UL (ref 4–5.2)
SODIUM BLD-SCNC: 136 MMOL/L (ref 136–145)
TCO2 CALC VENOUS: 85 MMOL/L
TOTAL PROTEIN: 8.7 G/DL (ref 6.4–8.2)
TROPONIN: <0.01 NG/ML
WBC # BLD: 11 K/UL (ref 4–11)
WBC # BLD: 11.6 K/UL (ref 4–11)

## 2020-10-28 PROCEDURE — U0003 INFECTIOUS AGENT DETECTION BY NUCLEIC ACID (DNA OR RNA); SEVERE ACUTE RESPIRATORY SYNDROME CORONAVIRUS 2 (SARS-COV-2) (CORONAVIRUS DISEASE [COVID-19]), AMPLIFIED PROBE TECHNIQUE, MAKING USE OF HIGH THROUGHPUT TECHNOLOGIES AS DESCRIBED BY CMS-2020-01-R: HCPCS

## 2020-10-28 PROCEDURE — 6360000002 HC RX W HCPCS: Performed by: PHYSICIAN ASSISTANT

## 2020-10-28 PROCEDURE — 83036 HEMOGLOBIN GLYCOSYLATED A1C: CPT

## 2020-10-28 PROCEDURE — 84484 ASSAY OF TROPONIN QUANT: CPT

## 2020-10-28 PROCEDURE — 82140 ASSAY OF AMMONIA: CPT

## 2020-10-28 PROCEDURE — 2060000000 HC ICU INTERMEDIATE R&B

## 2020-10-28 PROCEDURE — 85384 FIBRINOGEN ACTIVITY: CPT

## 2020-10-28 PROCEDURE — 99285 EMERGENCY DEPT VISIT HI MDM: CPT

## 2020-10-28 PROCEDURE — 85730 THROMBOPLASTIN TIME PARTIAL: CPT

## 2020-10-28 PROCEDURE — G0480 DRUG TEST DEF 1-7 CLASSES: HCPCS

## 2020-10-28 PROCEDURE — 85379 FIBRIN DEGRADATION QUANT: CPT

## 2020-10-28 PROCEDURE — 86140 C-REACTIVE PROTEIN: CPT

## 2020-10-28 PROCEDURE — 83690 ASSAY OF LIPASE: CPT

## 2020-10-28 PROCEDURE — 6360000002 HC RX W HCPCS: Performed by: HOSPITALIST

## 2020-10-28 PROCEDURE — 71045 X-RAY EXAM CHEST 1 VIEW: CPT

## 2020-10-28 PROCEDURE — 85025 COMPLETE CBC W/AUTO DIFF WBC: CPT

## 2020-10-28 PROCEDURE — 96375 TX/PRO/DX INJ NEW DRUG ADDON: CPT

## 2020-10-28 PROCEDURE — 2580000003 HC RX 258: Performed by: PHYSICIAN ASSISTANT

## 2020-10-28 PROCEDURE — 6370000000 HC RX 637 (ALT 250 FOR IP): Performed by: HOSPITALIST

## 2020-10-28 PROCEDURE — 96365 THER/PROPH/DIAG IV INF INIT: CPT

## 2020-10-28 PROCEDURE — 87040 BLOOD CULTURE FOR BACTERIA: CPT

## 2020-10-28 PROCEDURE — 96367 TX/PROPH/DG ADDL SEQ IV INF: CPT

## 2020-10-28 PROCEDURE — 83615 LACTATE (LD) (LDH) ENZYME: CPT

## 2020-10-28 PROCEDURE — 82803 BLOOD GASES ANY COMBINATION: CPT

## 2020-10-28 PROCEDURE — 83880 ASSAY OF NATRIURETIC PEPTIDE: CPT

## 2020-10-28 PROCEDURE — 93005 ELECTROCARDIOGRAM TRACING: CPT | Performed by: EMERGENCY MEDICINE

## 2020-10-28 PROCEDURE — 87150 DNA/RNA AMPLIFIED PROBE: CPT

## 2020-10-28 PROCEDURE — 36415 COLL VENOUS BLD VENIPUNCTURE: CPT

## 2020-10-28 PROCEDURE — 80053 COMPREHEN METABOLIC PANEL: CPT

## 2020-10-28 PROCEDURE — 83605 ASSAY OF LACTIC ACID: CPT

## 2020-10-28 PROCEDURE — 84145 PROCALCITONIN (PCT): CPT

## 2020-10-28 PROCEDURE — 85610 PROTHROMBIN TIME: CPT

## 2020-10-28 RX ORDER — ALBUTEROL SULFATE 90 UG/1
2 AEROSOL, METERED RESPIRATORY (INHALATION) 4 TIMES DAILY
Status: DISCONTINUED | OUTPATIENT
Start: 2020-10-28 | End: 2020-10-30

## 2020-10-28 RX ORDER — ACETAMINOPHEN 325 MG/1
650 TABLET ORAL EVERY 6 HOURS PRN
Status: DISCONTINUED | OUTPATIENT
Start: 2020-10-28 | End: 2020-10-31 | Stop reason: HOSPADM

## 2020-10-28 RX ORDER — BENZTROPINE MESYLATE 1 MG/1
1 TABLET ORAL NIGHTLY
Status: DISCONTINUED | OUTPATIENT
Start: 2020-10-28 | End: 2020-10-31 | Stop reason: HOSPADM

## 2020-10-28 RX ORDER — SODIUM CHLORIDE 0.9 % (FLUSH) 0.9 %
10 SYRINGE (ML) INJECTION EVERY 12 HOURS SCHEDULED
Status: DISCONTINUED | OUTPATIENT
Start: 2020-10-28 | End: 2020-10-31 | Stop reason: HOSPADM

## 2020-10-28 RX ORDER — INSULIN LISPRO 100 [IU]/ML
0-18 INJECTION, SOLUTION INTRAVENOUS; SUBCUTANEOUS
Status: DISCONTINUED | OUTPATIENT
Start: 2020-10-29 | End: 2020-10-31

## 2020-10-28 RX ORDER — PANTOPRAZOLE SODIUM 40 MG/1
40 TABLET, DELAYED RELEASE ORAL
Status: DISCONTINUED | OUTPATIENT
Start: 2020-10-29 | End: 2020-10-31 | Stop reason: HOSPADM

## 2020-10-28 RX ORDER — 0.9 % SODIUM CHLORIDE 0.9 %
1000 INTRAVENOUS SOLUTION INTRAVENOUS ONCE
Status: COMPLETED | OUTPATIENT
Start: 2020-10-28 | End: 2020-10-28

## 2020-10-28 RX ORDER — ONDANSETRON 4 MG/1
4 TABLET, ORALLY DISINTEGRATING ORAL EVERY 8 HOURS PRN
Status: DISCONTINUED | OUTPATIENT
Start: 2020-10-28 | End: 2020-10-31 | Stop reason: HOSPADM

## 2020-10-28 RX ORDER — DEXAMETHASONE SODIUM PHOSPHATE 10 MG/ML
6 INJECTION, SOLUTION INTRAMUSCULAR; INTRAVENOUS DAILY
Status: DISCONTINUED | OUTPATIENT
Start: 2020-10-29 | End: 2020-10-30

## 2020-10-28 RX ORDER — DIAZEPAM 5 MG/1
5 TABLET ORAL 2 TIMES DAILY PRN
Status: DISCONTINUED | OUTPATIENT
Start: 2020-10-28 | End: 2020-10-29

## 2020-10-28 RX ORDER — POLYETHYLENE GLYCOL 3350 17 G/17G
17 POWDER, FOR SOLUTION ORAL DAILY PRN
Status: DISCONTINUED | OUTPATIENT
Start: 2020-10-28 | End: 2020-10-31 | Stop reason: HOSPADM

## 2020-10-28 RX ORDER — SPIRONOLACTONE 25 MG/1
25 TABLET ORAL 2 TIMES DAILY
Status: DISCONTINUED | OUTPATIENT
Start: 2020-10-29 | End: 2020-10-29

## 2020-10-28 RX ORDER — ATORVASTATIN CALCIUM 20 MG/1
20 TABLET, FILM COATED ORAL NIGHTLY
Status: DISCONTINUED | OUTPATIENT
Start: 2020-10-29 | End: 2020-10-31 | Stop reason: HOSPADM

## 2020-10-28 RX ORDER — INSULIN LISPRO 100 [IU]/ML
0-9 INJECTION, SOLUTION INTRAVENOUS; SUBCUTANEOUS NIGHTLY
Status: DISCONTINUED | OUTPATIENT
Start: 2020-10-28 | End: 2020-10-31

## 2020-10-28 RX ORDER — ACETAMINOPHEN 650 MG/1
650 SUPPOSITORY RECTAL EVERY 6 HOURS PRN
Status: DISCONTINUED | OUTPATIENT
Start: 2020-10-28 | End: 2020-10-31 | Stop reason: HOSPADM

## 2020-10-28 RX ORDER — OXYCODONE HCL 10 MG/1
20 TABLET, FILM COATED, EXTENDED RELEASE ORAL 2 TIMES DAILY
Status: DISCONTINUED | OUTPATIENT
Start: 2020-10-29 | End: 2020-10-31 | Stop reason: HOSPADM

## 2020-10-28 RX ORDER — DEXTROSE MONOHYDRATE 25 G/50ML
12.5 INJECTION, SOLUTION INTRAVENOUS PRN
Status: DISCONTINUED | OUTPATIENT
Start: 2020-10-28 | End: 2020-10-31 | Stop reason: HOSPADM

## 2020-10-28 RX ORDER — KETOROLAC TROMETHAMINE 30 MG/ML
30 INJECTION, SOLUTION INTRAMUSCULAR; INTRAVENOUS ONCE
Status: COMPLETED | OUTPATIENT
Start: 2020-10-28 | End: 2020-10-28

## 2020-10-28 RX ORDER — INSULIN LISPRO 100 [IU]/ML
0.08 INJECTION, SOLUTION INTRAVENOUS; SUBCUTANEOUS
Status: DISCONTINUED | OUTPATIENT
Start: 2020-10-29 | End: 2020-10-29

## 2020-10-28 RX ORDER — FOLIC ACID 1 MG/1
1 TABLET ORAL DAILY
Status: DISCONTINUED | OUTPATIENT
Start: 2020-10-29 | End: 2020-10-31 | Stop reason: HOSPADM

## 2020-10-28 RX ORDER — PREGABALIN 50 MG/1
50 CAPSULE ORAL 2 TIMES DAILY
Status: DISCONTINUED | OUTPATIENT
Start: 2020-10-29 | End: 2020-10-31 | Stop reason: HOSPADM

## 2020-10-28 RX ORDER — SODIUM CHLORIDE 0.9 % (FLUSH) 0.9 %
10 SYRINGE (ML) INJECTION PRN
Status: DISCONTINUED | OUTPATIENT
Start: 2020-10-28 | End: 2020-10-31 | Stop reason: HOSPADM

## 2020-10-28 RX ORDER — FEBUXOSTAT 80 MG/1
80 TABLET, FILM COATED ORAL DAILY
Status: DISCONTINUED | OUTPATIENT
Start: 2020-10-29 | End: 2020-10-31 | Stop reason: HOSPADM

## 2020-10-28 RX ORDER — DULOXETIN HYDROCHLORIDE 60 MG/1
60 CAPSULE, DELAYED RELEASE ORAL DAILY
Status: DISCONTINUED | OUTPATIENT
Start: 2020-10-29 | End: 2020-10-31 | Stop reason: HOSPADM

## 2020-10-28 RX ORDER — DEXTROSE MONOHYDRATE 50 MG/ML
100 INJECTION, SOLUTION INTRAVENOUS PRN
Status: DISCONTINUED | OUTPATIENT
Start: 2020-10-28 | End: 2020-10-31 | Stop reason: HOSPADM

## 2020-10-28 RX ORDER — OXYCODONE HYDROCHLORIDE 5 MG/1
5 TABLET ORAL EVERY 6 HOURS PRN
Status: DISCONTINUED | OUTPATIENT
Start: 2020-10-28 | End: 2020-10-31 | Stop reason: HOSPADM

## 2020-10-28 RX ORDER — ASPIRIN 81 MG/1
81 TABLET ORAL DAILY
Status: DISCONTINUED | OUTPATIENT
Start: 2020-10-29 | End: 2020-10-31 | Stop reason: HOSPADM

## 2020-10-28 RX ORDER — RANOLAZINE 500 MG/1
500 TABLET, EXTENDED RELEASE ORAL 2 TIMES DAILY
Status: DISCONTINUED | OUTPATIENT
Start: 2020-10-29 | End: 2020-10-31 | Stop reason: HOSPADM

## 2020-10-28 RX ORDER — FERROUS SULFATE 325(65) MG
325 TABLET ORAL 2 TIMES DAILY WITH MEALS
Status: DISCONTINUED | OUTPATIENT
Start: 2020-10-29 | End: 2020-10-31 | Stop reason: HOSPADM

## 2020-10-28 RX ORDER — NICOTINE POLACRILEX 4 MG
15 LOZENGE BUCCAL PRN
Status: DISCONTINUED | OUTPATIENT
Start: 2020-10-28 | End: 2020-10-31 | Stop reason: HOSPADM

## 2020-10-28 RX ORDER — LEVOTHYROXINE SODIUM 0.15 MG/1
150 TABLET ORAL
Status: DISCONTINUED | OUTPATIENT
Start: 2020-10-29 | End: 2020-10-31 | Stop reason: HOSPADM

## 2020-10-28 RX ORDER — ACETAMINOPHEN 325 MG/1
650 TABLET ORAL EVERY 6 HOURS PRN
Status: DISCONTINUED | OUTPATIENT
Start: 2020-10-28 | End: 2020-10-28 | Stop reason: SDUPTHER

## 2020-10-28 RX ORDER — MONTELUKAST SODIUM 10 MG/1
10 TABLET ORAL DAILY
Status: DISCONTINUED | OUTPATIENT
Start: 2020-10-29 | End: 2020-10-31 | Stop reason: HOSPADM

## 2020-10-28 RX ORDER — ONDANSETRON 2 MG/ML
4 INJECTION INTRAMUSCULAR; INTRAVENOUS ONCE
Status: COMPLETED | OUTPATIENT
Start: 2020-10-28 | End: 2020-10-28

## 2020-10-28 RX ORDER — NITROGLYCERIN 0.4 MG/1
0.4 TABLET SUBLINGUAL EVERY 5 MIN PRN
Status: DISCONTINUED | OUTPATIENT
Start: 2020-10-28 | End: 2020-10-31 | Stop reason: HOSPADM

## 2020-10-28 RX ORDER — TORSEMIDE 100 MG/1
50 TABLET ORAL 2 TIMES DAILY
Status: DISCONTINUED | OUTPATIENT
Start: 2020-10-29 | End: 2020-10-29

## 2020-10-28 RX ORDER — ONDANSETRON 2 MG/ML
4 INJECTION INTRAMUSCULAR; INTRAVENOUS EVERY 6 HOURS PRN
Status: DISCONTINUED | OUTPATIENT
Start: 2020-10-28 | End: 2020-10-31 | Stop reason: HOSPADM

## 2020-10-28 RX ORDER — ACETAMINOPHEN 650 MG/1
650 SUPPOSITORY RECTAL EVERY 6 HOURS PRN
Status: DISCONTINUED | OUTPATIENT
Start: 2020-10-28 | End: 2020-10-28 | Stop reason: SDUPTHER

## 2020-10-28 RX ORDER — DEXAMETHASONE 4 MG/1
6 TABLET ORAL DAILY
Status: DISCONTINUED | OUTPATIENT
Start: 2020-10-28 | End: 2020-10-28 | Stop reason: SDUPTHER

## 2020-10-28 RX ADMIN — Medication 1 G: at 19:35

## 2020-10-28 RX ADMIN — KETOROLAC TROMETHAMINE 30 MG: 30 INJECTION, SOLUTION INTRAMUSCULAR at 19:04

## 2020-10-28 RX ADMIN — SODIUM CHLORIDE 1000 ML: 9 INJECTION, SOLUTION INTRAVENOUS at 19:35

## 2020-10-28 RX ADMIN — ENOXAPARIN SODIUM 30 MG: 30 INJECTION SUBCUTANEOUS at 23:31

## 2020-10-28 RX ADMIN — ONDANSETRON 4 MG: 2 INJECTION INTRAMUSCULAR; INTRAVENOUS at 19:35

## 2020-10-28 RX ADMIN — AZITHROMYCIN MONOHYDRATE 500 MG: 500 INJECTION, POWDER, LYOPHILIZED, FOR SOLUTION INTRAVENOUS at 19:35

## 2020-10-28 RX ADMIN — DEXAMETHASONE SODIUM PHOSPHATE 20 MG: 4 INJECTION, SOLUTION INTRAMUSCULAR; INTRAVENOUS at 19:05

## 2020-10-28 ASSESSMENT — PAIN SCALES - GENERAL
PAINLEVEL_OUTOF10: 0
PAINLEVEL_OUTOF10: 0
PAINLEVEL_OUTOF10: 6
PAINLEVEL_OUTOF10: 8

## 2020-10-28 ASSESSMENT — ENCOUNTER SYMPTOMS: SHORTNESS OF BREATH: 1

## 2020-10-28 NOTE — ED NOTES
Bed: 18  Expected date:   Expected time:   Means of arrival:   Comments:   RN has 1 Fadia Armstrong, KARY  10/28/20 [id number]

## 2020-10-28 NOTE — ED PROVIDER NOTES
Emergency Department Encounter    Patient: Karolina Ozuna  MRN: [id number]  : 1970  Date of Evaluation: 10/30/2020  ED Provider:  Reading Oats    Triage Chief Complaint:   Shortness of Breath (Pt arrived via [facility] squad from home. Squad states call was for N/V, pt 90% RA. Speaks Slovak.)    Stebbins:  Karolina Ozuna is a 45 y.o. female that presents to the ER for evaluation of altered mental status, marked confusion, response to painful stimuli, history of pneumonia congestive heart failure COPD diabetes chronic kidney disease, prior history of breast cancer with chronic lymphedema. Poor informant altered mental status increased respiratory distress.   Hypoxia on arrival.  ROS - see HPI, below listed is current ROS at time of my eval:  Unable to fully obtained given patient's clinical condition    Past Medical History:   Diagnosis Date    Anxiety     Anxiety and depression     Arthritis     not sure of specific type    Asthma     CAD (coronary artery disease)     Cancer ([address]     left breast    Cerebral artery occlusion with cerebral infarction ([address]     ,     CHF (congestive heart failure) ([address]     Chronic kidney disease     renal insufficency/to see Dr Megan Conde    Chronic pain     Constipation     COPD (chronic obstructive pulmonary disease) ([address]     Depression     Diabetes mellitus ([address]     Diabetic polyneuropathy associated with type 2 diabetes mellitus ([address] 2018    Dysthymia 2018    ESBL (extended spectrum beta-lactamase) producing bacteria infection 2018    urine    Fibromyalgia     Gastric ulcer, unspecified as acute or chronic, without mention of hemorrhage, perforation, or obstruction     GERD (gastroesophageal reflux disease)     Gout     HIGH CHOLESTEROL     Hypertension     Hypothyroidism     Severe persistent asthma without complication 1651    Thyroid disease     TIA (transient ischemic attack)     with occasional left leg and hand weakness     Past Surgical History:   Procedure Laterality Date    BREAST SURGERY      left mastectomy    CARPAL TUNNEL RELEASE      Bilateral    FINGER CONTRACTURE SURGERY      HYSTERECTOMY  2005    USO    TONSILLECTOMY      UPPER GASTROINTESTINAL ENDOSCOPY  2019    stretched esophagous      Family History   Problem Relation Age of Onset    Asthma Other     Cancer Other     Depression Other     Diabetes Other     Hypertension Other     High Cholesterol Other     Migraines Other     Heart Attack Father 72         of MI    High Blood Pressure Mother     Diabetes Mother      Social History     Socioeconomic History    Marital status:      Spouse name: Tay Zheng Number of children: 3    Years of education: Not on file    Highest education level: Not on file   Occupational History    Occupation: disabled   Social Needs    Financial resource strain: Not on file    Food insecurity     Worry: Not on file     Inability: Not on file   [employer] needs     Medical: Not on file     Non-medical: Not on file   Tobacco Use    Smoking status: Never Smoker    Smokeless tobacco: Never Used   Substance and Sexual Activity    Alcohol use: No    Drug use: No    Sexual activity: Not Currently   Lifestyle    Physical activity     Days per week: Not on file     Minutes per session: Not on file    Stress: Not on file   Relationships    Social connections     Talks on phone: Not on file     Gets together: Not on file     Attends Taoism service: Not on file     Active member of club or organization: Not on file     Attends meetings of clubs or organizations: Not on file     Relationship status: Not on file    Intimate partner violence     Fear of current or ex partner: Not on file     Emotionally abused: Not on file     Physically abused: Not on file     Forced sexual activity: Not on file   Other Topics Concern    Not on file   Social History Narrative    Not on file     Current Facility-Administered Medications   Medication Dose Route Frequency Provider Last Rate Last Dose    perflutren lipid microspheres (DEFINITY) injection 1.65 mg  1.5 mL Intravenous ONCE PRN Aman Chin MD        glucose (GLUTOSE) 40 % oral gel 15 g  15 g Oral PRN Francine Guthrie, APRN - CNP        dextrose 50 % IV solution  12.5 g Intravenous PRN Francine Copeistopher, APRN - CNP        glucagon (rDNA) injection 1 mg  1 mg Intramuscular PRN Francine Copeistopher, APRN - CNP        dextrose 5 % solution  100 mL/hr Intravenous PRN Francine Copeistopher, APRN - CNP        budesonide-formoterol (SYMBICORT) 160-4.5 MCG/ACT inhaler 2 puff  2 puff Inhalation BID Holly Mott MD   2 puff at 10/29/20 1925    insulin glargine (LANTUS;BASAGLAR) injection pen 75 Units  75 Units Subcutaneous BID Holly Mott MD   75 Units at 10/29/20 2104    insulin lispro (1 Unit Dial) 15 Units  15 Units Subcutaneous TID  Francine Guthrie, APRN - CNP   15 Units at 10/29/20 1650    aspirin EC tablet 81 mg  81 mg Oral Daily Aman Chin MD   81 mg at 10/29/20 0910    benztropine (COGENTIN) tablet 1 mg  1 mg Oral Nightly Aman Chin MD   1 mg at 10/29/20 2054    cariprazine hcl (VRAYLAR) capsule 1.5 mg - PATIENT SUPPLIED  1.5 mg Oral Daily Aman Chin MD        DULoxetine (CYMBALTA) extended release capsule 60 mg  60 mg Oral Daily Aman Chin MD   60 mg at 10/29/20 0910    Febuxostat (Uloric) Tablet 80 mg - PATIENT SUPPLIED  80 mg Oral Daily Aman Chin MD        ferrous sulfate (IRON 325) tablet 325 mg  325 mg Oral BID  Aman Chin MD   325 mg at 26/30/69 4416    folic acid (FOLVITE) tablet 1 mg  1 mg Oral Daily Aman Chin MD   1 mg at 10/29/20 9677    levothyroxine (SYNTHROID) tablet 150 mcg  150 mcg Oral QAM  Aman Chin MD   150 mcg at 10/29/20 0532    metoprolol tartrate (LOPRESSOR) tablet 25 mg  25 mg Oral BID Aman Chin, MD   25 mg at 10/29/20 2054    montelukast (SINGULAIR) tablet 10 mg  10 mg Oral Daily Sd Fontanez MD   10 mg at 10/29/20 5253    nitroGLYCERIN (NITROSTAT) SL tablet 0.4 mg  0.4 mg Sublingual Q5 Min PRN Sd Fontanez MD        nystatin (MYCOSTATIN) 189546 UNIT/ML suspension 500,000 Units  500,000 Units Oral 4x Daily Sd Fontanez MD   500,000 Units at 10/29/20 2053    pantoprazole (PROTONIX) tablet 40 mg  40 mg Oral QAM AC Sd Fontanez MD   40 mg at 10/29/20 0532    oxyCODONE (OXYCONTIN) extended release tablet 20 mg  20 mg Oral BID Sd Fontanez MD   20 mg at 10/29/20 2054    pregabalin (LYRICA) capsule 50 mg  50 mg Oral BID Sd Fontanez MD   50 mg at 10/29/20 2054    ranolazine (RANEXA) extended release tablet 500 mg  500 mg Oral BID Sd Fontanez MD   500 mg at 10/29/20 2054    atorvastatin (LIPITOR) tablet 20 mg  20 mg Oral Nightly Sd Fontanez MD   20 mg at 10/29/20 2054    acetaminophen (TYLENOL) tablet 650 mg  650 mg Oral Q6H PRN Sd Fontanez MD        Or   Theodore Jaffe acetaminophen (TYLENOL) suppository 650 mg  650 mg Rectal Q6H PRN Sd Fontanez MD        enoxaparin (LOVENOX) injection 30 mg  30 mg Subcutaneous BID Sd Fontanez MD   30 mg at 10/29/20 2054    glucose (GLUTOSE) 40 % oral gel 15 g  15 g Oral PRN Sd Fontanez MD        dextrose 50 % IV solution  12.5 g Intravenous PRN Sd Fontanez MD        glucagon (rDNA) injection 1 mg  1 mg Intramuscular PRN Sd Fontanez MD        dextrose 5 % solution  100 mL/hr Intravenous PRN Sd Fontanez MD        insulin lispro (1 Unit Dial) 0-18 Units  0-18 Units Subcutaneous TID WC Sd Fontanez MD   9 Units at 10/29/20 1644    insulin lispro (1 Unit Dial) 0-9 Units  0-9 Units Subcutaneous Nightly Sd Fontanez MD   6 Units at 10/29/20 2103    sodium chloride flush 0.9 % injection 10 mL  10 mL Intravenous 2 times per day Sd Fontanez MD   10 mL at 10/29/20 2054    sodium chloride flush 0.9 % injection 10 mL  10 mL Intravenous PRN Edgar Dickey MD        polyethylene glycol (GLYCOLAX) packet 17 g  17 g Oral Daily PRN Edgar Dickey MD        ondansetron (ZOFRAN-ODT) disintegrating tablet 4 mg  4 mg Oral Q8H PRN Edgar Dickey MD        Or    [facility]ron [facility]) injection 4 mg  4 mg Intravenous Q6H PRN Edgar Dickey MD        albuterol sulfate  (90 Base) MCG/ACT inhaler 2 puff  2 puff Inhalation 4x daily Edgar Dickey MD   2 puff at 10/29/20 1925    And    ipratropium (ATROVENT HFA) 17 MCG/ACT inhaler 2 puff  2 puff Inhalation 4x daily Edgar Dickey MD   2 puff at 10/29/20 1925    oxyCODONE (ROXICODONE) immediate release tablet 5 mg  5 mg Oral Q6H PRN Edgar Dickey MD        dexamethasone (PF) (DECADRON) injection 6 mg  6 mg Intravenous Daily Edgar Dickey MD   6 mg at 10/29/20 1933     Allergies   Allergen Reactions    Iv Dye [Iodides] Anaphylaxis     allergic to ct scan dye, not the MRI    Diazepam Other (See Comments)    Trazodone And Nefazodone Other (See Comments)     Makes patient feel very sluggish       Nursing Notes Reviewed    Physical Exam:  Triage VS:    ED Triage Vitals [10/28/20 1620]   Enc Vitals Group      BP (!) 155/89      Pulse 98      Resp 20      Temp 99.2 °F (37.3 °C)      Temp Source Oral      SpO2 95 %      Weight 185 lb (83.9 kg)      Height 5' (1.524 m)      Head Circumference       Peak Flow       Pain Score       Pain Loc       Pain Edu? Excl. in [address]? My pulse ox interpretation is - abnormal    General appearance: In respiratory distress  Skin:  Warm. Dry. No pallor. No rash. Eye:  Normal conjuctiva. no Icterus. Ears, nose, mouth and throat:  Oral mucosa moist   Heart: Tachycardia, regular rhythm  Perfusion:  Within normal limits. Respiratory: As of tachypnea, poor air exchange, rhonchi bilaterally diminished breath sounds bilaterally  Abdominal:  Soft. Nontender. Non distended.      Extremity:  No edema or 7.430 7.350 - 7.450    pCO2, Jurgen 54.9 (H) 40.0 - 50.0 mmHg    pO2, Jurgen 93.5 (H) 25.0 - 40.0 mmHg    HCO3, Venous 36.4 (H) 23.0 - 29.0 mmol/L    Base Excess, Jurgen 10.1 (H) -3.0 - 3.0 mmol/L    O2 Sat, Jurgen 94 Not Established %    Carboxyhemoglobin 0.7 0.0 - 1.5 %    MetHgb, Jurgen 0.4 <1.5 %    TC02 (Calc), Jurgen 85 Not Established mmol/L    O2 Content, Jurgen 18 Not Established VOL %    O2 Therapy Unknown     Hemoglobin, Jurgen, Reduced 6 %   Lactic Acid, Plasma   Result Value Ref Range    Lactic Acid 1.3 0.4 - 2.0 mmol/L   Hepatic Function Panel   Result Value Ref Range    Total Protein 8.7 (H) 6.4 - 8.2 g/dL    Alb 4.5 3.4 - 5.0 g/dL    Alkaline Phosphatase 111 40 - 129 U/L    ALT 19 10 - 40 U/L    AST 36 15 - 37 U/L    Total Bilirubin <0.2 0.0 - 1.0 mg/dL    Bilirubin, Direct <0.2 0.0 - 0.3 mg/dL    Bilirubin, Indirect see below 0.0 - 1.0 mg/dL   Lipase   Result Value Ref Range    Lipase 18.0 13.0 - 60.0 U/L   APTT   Result Value Ref Range    aPTT 33.5 24.2 - 36.2 sec   Protime-INR   Result Value Ref Range    Protime 10.0 10.0 - 13.2 sec    INR 0.86 0.86 - 1.14   Ammonia   Result Value Ref Range    Ammonia 20 11 - 51 umol/L   Acetaminophen Level   Result Value Ref Range    Acetaminophen Level <5 (L) 10 - 30 ug/mL   Ethanol   Result Value Ref Range    Ethanol Lvl None Detected mg/dL   COVID-19   Result Value Ref Range    SARS-CoV-2, PCR Not Detected Not Detected   Protime-INR   Result Value Ref Range    Protime 10.6 10.0 - 13.2 sec    INR 0.91 0.86 - 1.14   Protime-INR   Result Value Ref Range    Protime 10.6 10.0 - 13.2 sec    INR 0.91 0.86 - 1.14   Comprehensive Metabolic Panel w/ Reflex to MG   Result Value Ref Range    Sodium 136 136 - 145 mmol/L    Potassium reflex Magnesium 4.4 3.5 - 5.1 mmol/L    Chloride 92 (L) 99 - 110 mmol/L    CO2 32 21 - 32 mmol/L    Anion Gap 12 3 - 16    Glucose 211 (H) 70 - 99 mg/dL    BUN 29 (H) 7 - 20 mg/dL    CREATININE 1.2 (H) 0.6 - 1.1 mg/dL    GFR Non- 47 (A) >60    GFR  57 (A) >60    Calcium 9.9 8.3 - 10.6 mg/dL    Total Protein 8.3 (H) 6.4 - 8.2 g/dL    Alb 4.0 3.4 - 5.0 g/dL    Albumin/Globulin Ratio 0.9 (L) 1.1 - 2.2    Total Bilirubin <0.2 0.0 - 1.0 mg/dL    Alkaline Phosphatase 111 40 - 129 U/L    ALT 18 10 - 40 U/L    AST 29 15 - 37 U/L    Globulin 4.3 g/dL   Comprehensive Metabolic Panel w/ Reflex to MG   Result Value Ref Range    Sodium 134 (L) 136 - 145 mmol/L    Potassium reflex Magnesium 4.4 3.5 - 5.1 mmol/L    Chloride 90 (L) 99 - 110 mmol/L    CO2 31 21 - 32 mmol/L    Anion Gap 13 3 - 16    Glucose 304 (H) 70 - 99 mg/dL    BUN 32 (H) 7 - 20 mg/dL    CREATININE 1.2 (H) 0.6 - 1.1 mg/dL    GFR Non- 47 (A) >60    GFR  57 (A) >60    Calcium 9.7 8.3 - 10.6 mg/dL    Total Protein 8.0 6.4 - 8.2 g/dL    Alb 4.0 3.4 - 5.0 g/dL    Albumin/Globulin Ratio 1.0 (L) 1.1 - 2.2    Total Bilirubin <0.2 0.0 - 1.0 mg/dL    Alkaline Phosphatase 103 40 - 129 U/L    ALT 17 10 - 40 U/L    AST 26 15 - 37 U/L    Globulin 4.0 g/dL   CBC Auto Differential   Result Value Ref Range    WBC 11.6 (H) 4.0 - 11.0 K/uL    RBC 4.55 4.00 - 5.20 M/uL    Hemoglobin 13.0 12.0 - 16.0 g/dL    Hematocrit 39.6 36.0 - 48.0 %    MCV 87.1 80.0 - 100.0 fL    MCH 28.7 26.0 - 34.0 pg    MCHC 32.9 31.0 - 36.0 g/dL    RDW 14.5 12.4 - 15.4 %    Platelets 092 177 - 334 K/uL    MPV 8.5 5.0 - 10.5 fL    Neutrophils % 87.3 %    Lymphocytes % 11.3 %    Monocytes % 0.8 %    Eosinophils % 0.1 %    Basophils % 0.5 %    Neutrophils Absolute 10.2 (H) 1.7 - 7.7 K/uL    Lymphocytes Absolute 1.3 1.0 - 5.1 K/uL    Monocytes Absolute 0.1 0.0 - 1.3 K/uL    Eosinophils Absolute 0.0 0.0 - 0.6 K/uL    Basophils Absolute 0.1 0.0 - 0.2 K/uL   CBC Auto Differential   Result Value Ref Range    WBC 8.5 4.0 - 11.0 K/uL    RBC 4.36 4.00 - 5.20 M/uL    Hemoglobin 12.7 12.0 - 16.0 g/dL    Hematocrit 39.1 36.0 - 48.0 %    MCV 89.7 80.0 - 100.0 fL    MCH 29.2 26.0 - 34.0 pg    MCHC 32.6 31.0 - 36.0 g/dL RDW 14.9 12.4 - 15.4 %    Platelets 136 602 - 971 K/uL    MPV 8.4 5.0 - 10.5 fL    Neutrophils % 84.9 %    Lymphocytes % 14.1 %    Monocytes % 0.8 %    Eosinophils % 0.0 %    Basophils % 0.2 %    Neutrophils Absolute 7.2 1.7 - 7.7 K/uL    Lymphocytes Absolute 1.2 1.0 - 5.1 K/uL    Monocytes Absolute 0.1 0.0 - 1.3 K/uL    Eosinophils Absolute 0.0 0.0 - 0.6 K/uL    Basophils Absolute 0.0 0.0 - 0.2 K/uL   C-Reactive Protein   Result Value Ref Range    CRP 25.5 (H) 0.0 - 5.1 mg/L   Procalcitonin   Result Value Ref Range    Procalcitonin 0.07 0.00 - 0.15 ng/mL   Lactate Dehydrogenase   Result Value Ref Range     100 - 190 U/L   Fibrinogen   Result Value Ref Range    Fibrinogen 753 (H) 200 - 397 mg/dL   Fibrinogen   Result Value Ref Range    Fibrinogen 729 (H) 200 - 397 mg/dL   D-Dimer, Quantitative   Result Value Ref Range    D-Dimer, Quant 248 (H) 0 - 229 ng/mL DDU   D-Dimer, Quantitative   Result Value Ref Range    D-Dimer, Quant 242 (H) 0 - 229 ng/mL DDU   Troponin   Result Value Ref Range    Troponin <0.01 <0.01 ng/mL   Lactic Acid, Plasma   Result Value Ref Range    Lactic Acid 1.1 0.4 - 2.0 mmol/L   Hemoglobin A1c   Result Value Ref Range    Hemoglobin A1C 8.8 See comment %    eAG 205.9 mg/dL   Urinalysis   Result Value Ref Range    Color, UA YELLOW Straw/Yellow    Clarity, UA Clear Clear    Glucose, Ur 500 (A) Negative mg/dL    Bilirubin Urine Negative Negative    Ketones, Urine Negative Negative mg/dL    Specific Gravity, UA 1.010 1.005 - 1.030    Blood, Urine Negative Negative    pH, UA 8.0 5.0 - 8.0    Protein, UA Negative Negative mg/dL    Urobilinogen, Urine 0.2 <2.0 E.U./dL    Nitrite, Urine Negative Negative    Leukocyte Esterase, Urine Negative Negative    Microscopic Examination Not Indicated     Urine Type Voided    TSH without Reflex   Result Value Ref Range    TSH 0.91 0.27 - 4.20 uIU/mL   T4, Free   Result Value Ref Range    T4 Free 1.0 0.9 - 1.8 ng/dL   Basic metabolic panel   Result Value respiratory failure with hypoxia ([facility]      Disposition referral (if applicable):  No follow-up provider specified. Disposition medications (if applicable):  Current Discharge Medication List          Comment: Please note this report has been produced using speech recognition software and may contain errors related to that system including errors in grammar, punctuation, and spelling, as well as words and phrases that may be inappropriate. Efforts were made to edit the dictations.       Maame Villanueva MD  [id number]

## 2020-10-28 NOTE — ED NOTES
Pharmacy Medication History Note      List of current medications patient is taking is complete. Source of information: Patient pharmacy    Changes made to medication list:  Medications flagged for removal (include reason, ex. noncompliance):  none    Medications removed (include reason, ex. therapy complete or physician discontinued):  Glipizide- therapy completed  Albuterol nebs/inhaler- therapy completed  Fioricet- therapy completed    Medications added/doses adjusted:  none    Other notes (ex. Recent course of antibiotics, Coumadin dosing):  Denies use of other OTC or herbal medications. Last dose times updated. Donnie Leigh, PharmD  ED Pharmacist [id number]  10/28/2020    No current facility-administered medications on file prior to encounter.         Current Outpatient Medications on File Prior to Encounter   Medication Sig Dispense Refill    simvastatin (ZOCOR) 20 MG tablet TAKE 1 TABLET BY MOUTH NIGHTLY  90 tablet 0    metoprolol tartrate (LOPRESSOR) 25 MG tablet TAKE 1 TABLET BY MOUTH TWO TIMES A DAY  180 tablet 0    Ertugliflozin L-PyroglutamicAc (STEGLATRO) 5 MG TABS TAKE 1 TABLET BY MOUTH ONE TIME A DAY 30 tablet 2    spironolactone (ALDACTONE) 50 MG tablet TAKE 2 TABLETS BY MOUTH IN THE MORNING AND ONE TABLET IN THE EVENING 90 tablet 5    omeprazole (PRILOSEC) 20 MG delayed release capsule TAKE 1 CAPSULE BY MOUTH TWO TIMES A DAY  60 capsule 5    loratadine (CLARITIN) 10 MG tablet TAKE 1 TABLET BY MOUTH ONE TIME A DAY  30 tablet 5    tiZANidine (ZANAFLEX) 2 MG tablet TAKE 1 TABLET BY MOUTH EVERY EIGHT HOURS AS NEEDED FOR PAIN AND SPASM 30 tablet 0    nystatin (MYCOSTATIN) 865880 UNIT/ML suspension Take 5 mLs by mouth 4 times daily 500 mL 1    Magic Mouthwash (MIRACLE MOUTHWASH) Swish and spit 5 mLs 4 times daily as needed for Irritation or Pain 300 mL 2    montelukast (SINGULAIR) 10 MG tablet TAKE 1 TABLET BY MOUTH ONE TIME A DAY  30 tablet 3    ferrous sulfate (IRON 325) 325 (65 Fe) MG tablet

## 2020-10-28 NOTE — H&P
quite long and cumbersome making compliance and arduous task      REVIEW OF SYSTEMS:   Constitutional: Positive for fever,chills or night sweats  ENT: Negative for rhinorrhea, epistaxis, hoarseness; positive sore throat; negative anosmia  Respiratory: Positive for shortness of breath,wheezing and productive cough  Cardiovascular: Positive for atypical chest pain   Gastrointestinal: Positive for nausea, vomiting, diarrhea  Genitourinary: Negative for polyuria, dysuria   Hematologic/Lymphatic: Negative for bleeding tendency, easy bruising  Musculoskeletal: Positive for myalgias and arthralgias; on chronic opiate therapy  Neurologic: Transient confusion 2 days prior to arrival; no seizure activity  Skin: Negative for itching,rash  Psychiatric: Positive depression anxiety  Endocrine: Negative for polydipsia,polyuria,heat /cold intolerance. Past Medical History:   has a past medical history of Anxiety, Anxiety and depression, Arthritis, Asthma, CAD (coronary artery disease), Cancer ([facility] [address] Cerebral artery occlusion with cerebral infarction ([facility] [address] CHF (congestive heart failure) ([facility] [address] Chronic kidney disease, Chronic pain, Constipation, COPD (chronic obstructive pulmonary disease) ([facility] [address] Depression, Diabetes mellitus ([facility] [address] Diabetic polyneuropathy associated with type 2 diabetes mellitus ([facility] [address] Dysthymia, ESBL (extended spectrum beta-lactamase) producing bacteria infection, Fibromyalgia, Gastric ulcer, unspecified as acute or chronic, without mention of hemorrhage, perforation, or obstruction, GERD (gastroesophageal reflux disease), Gout, HIGH CHOLESTEROL, Hypertension, Hypothyroidism, Severe persistent asthma without complication, Thyroid disease, and TIA (transient ischemic attack). Past Surgical History:   has a past surgical history that includes Breast surgery; Hysterectomy (2005); Carpal tunnel release; Tonsillectomy; Finger contracture surgery; and Upper gastrointestinal endoscopy (2019).      Medications:  No current facility-administered medications on file prior to encounter. Current Outpatient Medications on File Prior to Encounter   Medication Sig Dispense Refill    simvastatin (ZOCOR) 20 MG tablet TAKE 1 TABLET BY MOUTH NIGHTLY  90 tablet 0    metoprolol tartrate (LOPRESSOR) 25 MG tablet TAKE 1 TABLET BY MOUTH TWO TIMES A DAY  180 tablet 0    Ertugliflozin L-PyroglutamicAc (STEGLATRO) 5 MG TABS TAKE 1 TABLET BY MOUTH ONE TIME A DAY 30 tablet 2    spironolactone (ALDACTONE) 50 MG tablet TAKE 2 TABLETS BY MOUTH IN THE MORNING AND ONE TABLET IN THE EVENING 90 tablet 5    omeprazole (PRILOSEC) 20 MG delayed release capsule TAKE 1 CAPSULE BY MOUTH TWO TIMES A DAY  60 capsule 5    loratadine (CLARITIN) 10 MG tablet TAKE 1 TABLET BY MOUTH ONE TIME A DAY  30 tablet 5    tiZANidine (ZANAFLEX) 2 MG tablet TAKE 1 TABLET BY MOUTH EVERY EIGHT HOURS AS NEEDED FOR PAIN AND SPASM 30 tablet 0    nystatin (MYCOSTATIN) 231463 UNIT/ML suspension Take 5 mLs by mouth 4 times daily 500 mL 1    Magic Mouthwash (MIRACLE MOUTHWASH) Swish and spit 5 mLs 4 times daily as needed for Irritation or Pain 300 mL 2    montelukast (SINGULAIR) 10 MG tablet TAKE 1 TABLET BY MOUTH ONE TIME A DAY  30 tablet 3    ferrous sulfate (IRON 325) 325 (65 Fe) MG tablet TAKE 1 TABLET BY MOUTH TWO TIMES A DAY WITH MEALS 180 tablet 0    lidocaine viscous hcl (XYLOCAINE) 2 % SOLN solution Take 5 mLs by mouth every 3 hours as needed for Irritation or Pain 100 mL 2    ketoconazole (NIZORAL) 2 % shampoo Apply topically daily as needed. 120 mL 1    Insulin Degludec (TRESIBA FLEXTOUCH) 200 UNIT/ML SOPN inject 160 units subcutaneously once daily 16 pen 3    NOVOLOG FLEXPEN 100 UNIT/ML injection pen INJECT 30 TO 50 UNITS INTO THE SKIN THREE TIMES DAILY BEFORE MEALS 30 mL 3    metOLazone (ZAROXOLYN) 2.5 MG tablet TAKE 1 TABLET BY MOUTH TWO TIMES A WEEK AS NEEDED for weight over 180 pounds.  do not take two days in a row 60 tablet 0    nitroGLYCERIN (NITROSTAT) 0.4 MG SL tablet Place 1 tablet under the tongue every 5 minutes as needed for Chest pain up to max of 3 total doses. If no relief after 1 dose, call 911. 25 tablet 3    vitamin D3 (CHOLECALCIFEROL) 25 MCG (1000 UT) TABS tablet TAKE 3 TABLETS BY MOUTH ONE TIME A DAY 90 tablet 5    levothyroxine (SYNTHROID) 150 MCG tablet Take 1 tablet by mouth every morning (before breakfast) 90 tablet 3    diazePAM (VALIUM) 5 MG tablet Take 5 mg by mouth 2 times daily as needed for Anxiety.  cariprazine hcl (VRAYLAR) 1.5 MG capsule Take 1.5 mg by mouth daily      Febuxostat 80 MG TABS Take 80 mg by mouth daily      pregabalin (LYRICA) 50 MG capsule Take 50 mg by mouth 2 times daily.  lubiprostone (AMITIZA) 24 MCG capsule Take 24 mcg by mouth daily (with breakfast)      torsemide (DEMADEX) 100 MG tablet TAKE 1 TABLET BY MOUTH IN THE MORNING AND 1/2 TABLET IN THE EVENING  135 tablet 3    ranolazine (RANEXA) 500 MG extended release tablet TAKE 1 TABLET BY MOUTH TWO TIMES A DAY  180 tablet 3    leflunomide (ARAVA) 10 MG tablet Take 10 mg by mouth daily       OXYGEN Inhale 3 L into the lungs nightly Sometimes with activity      oxyCODONE (OXYCONTIN) 20 MG extended release tablet Take 20 mg by mouth every 12 hours.  aspirin 81 MG EC tablet Take 81 mg by mouth daily      sulfaSALAzine (AZULFIDINE) 500 MG tablet Take 500 mg by mouth 3 times daily       benztropine (COGENTIN) 1 MG tablet Take 1 mg by mouth nightly       folic acid (FOLVITE) 1 MG tablet Take 1 mg by mouth daily      oxyCODONE-acetaminophen (PERCOCET)  MG per tablet Take 1 tablet by mouth every 4 hours as needed for Pain . Earliest Fill Date: 6/8/17 100 tablet 0    duloxetine (CYMBALTA) 60 MG capsule Take 60 mg by mouth 2 times daily.         B-D UF III MINI PEN NEEDLES 31G X 5 MM MISC use five times daily with insulin 100 each 0    blood glucose test strips (FREESTYLE LITE) strip test blood sugar four times daily 200 strip 5 10/28/2020    MCHC 32.9 10/28/2020    RDW 14.5 10/28/2020     10/28/2020    MPV 8.5 10/28/2020     BMP:    Lab Results   Component Value Date     10/28/2020    K 4.4 10/28/2020    CL 92 10/28/2020    CO2 32 10/28/2020    BUN 29 10/28/2020    CREATININE 1.2 10/28/2020    CALCIUM 9.9 10/28/2020    GFRAA 57 10/28/2020    GFRAA >60 06/19/2011    LABGLOM 47 10/28/2020    GLUCOSE 211 10/28/2020    GLUCOSE 313 05/16/2017     XR CHEST PORTABLE   Final Result   1. Patchy bibasilar opacities may represent multifocal pneumonia or pulmonary   edema. 2. Mild pulmonary edema. Chest Xray:   EKG:    Ventricular Rate  88 P BPM  QTc Calculation (Bazett)  467 P ms    Atrial Rate  88 P BPM  P Axis  21 P degrees    P-R Interval  138 P ms  R Axis  21 P degrees    QRS Duration  80 P ms  T Axis  34 P degrees    Q-T Interval  386 P ms  Diagnosis  Normal sinus rhythmNormal ECGWhen compared with ECG of 24-MAR-2020 23:23,No significant change wa. .. P        I visualized CXR images and EKG strips personally and agree with documented interpretation    Discussed case  with ED attending as well as ICU RN    Problem List  Principal Problem:    Acute respiratory failure with hypoxemia (HCC)  Active Problems:    Chronic congestive heart failure (HCC)    Chronic pain    Coronary artery disease involving native coronary artery of native heart without angina pectoris    Essential hypertension    Poorly controlled type 2 diabetes mellitus ([facility] [address]    Hypothyroidism    Hiatal hernia with GERD    LUIS (obstructive sleep apnea)    Acute hypoxemic respiratory failure ([facility] [address]  Resolved Problems:    * No resolved hospital problems.  *        Assessment/Plan:     Acute respiratory failure with hypoxemia  -Patient admitted to TCU under COVID-19 pathway until SARS-CoV-2 infection is ruled out  -Combivent MDI, incentive spirometry, Acapella, and home CPAP per RT  -IV Decadron initiated due to O2 need  -Lovenox 30 mg twice daily initiated until D-dimer results are available  -Due to MDRO/ESBL history, antibiotics changed to vancomycin x1, Merrem every 8, and Zithromax daily pending ID recommendations    Chronic diastolic dysfunction with vascular congestion  -No IV fluid resuscitation ordered and lieu of edema on CXR  -Patient with mild MARIA D therefore Aldactone and Bumex dosages decreased temporarily pending cardiology recommendations  -Strict I's and O's with Carmona placed  -Most recent echo performed 1/28/2020 revealed EF of 60%; repeat in a.m. for comparison    CAD  -Continue ASA, beta-blocker, Ranexa and statin therapy  -EKG without ischemia    Uncontrolled IDDM  -A1c pending  -Patient on Tresiba 160 units once daily which will be continued per pharmacy recommendations  -Humalog ordered AC at bedtime as well as high-dose SSI as needed    Hypothyroidism  -TSH and free T4 pending  -Continue levothyroxine supplementation    Chronic pain with opiate dependence  -Continue OxyContin 20 twice daily  -Oxycodone dose decreased and will be used sparingly and lieu of respiratory compromise        DVT prophylaxis-Lovenox 30 mg twice daily pending COVID-19 and D-dimer results  Code status-full code  Diet-2 g sodium with fluid/carb restriction  IV access-patient has port  Carmona Catheter-placed upon arrival to [address] [place] [address] as inpatient. I anticipate hospitalization spanning more than two midnights for investigation and treatment of the above medically necessary diagnoses. Please note that some part of this chart was generated using Dragon dictation software. Although every effort was made to ensure the accuracy of this automated transcription, some errors in transcription may have occurred inadvertently. If you may need any clarification, please do not hesitate to contact me through [facility].        Wiley Ramos MD    10/29/2020 4:04 AM

## 2020-10-28 NOTE — ED PROVIDER NOTES
negative.        PAST MEDICAL HISTORY     Past Medical History:   Diagnosis Date    Anxiety     Anxiety and depression     Arthritis     not sure of specific type    Asthma     CAD (coronary artery disease)     Cancer ([facility] 2007    left breast    Cerebral artery occlusion with cerebral infarction ([facility]     CHF (congestive heart failure) ([facility] 2018    Chronic kidney disease     renal insufficency/to see Dr Etta Parks    Chronic pain     Constipation     COPD (chronic obstructive pulmonary disease) ([facility]     Depression     Diabetes mellitus ([facility]     Diabetic polyneuropathy associated with type 2 diabetes mellitus ([facility] 2/2/2018    Dysthymia 2/2/2018    ESBL (extended spectrum beta-lactamase) producing bacteria infection 03/14/2018    urine    Fibromyalgia     Gastric ulcer, unspecified as acute or chronic, without mention of hemorrhage, perforation, or obstruction     GERD (gastroesophageal reflux disease)     Gout     HIGH CHOLESTEROL     Hypertension     Hypothyroidism     Severe persistent asthma without complication 3/2/8919    Thyroid disease     TIA (transient ischemic attack)     with occasional left leg and hand weakness         SURGICAL HISTORY     Past Surgical History:   Procedure Laterality Date    BREAST SURGERY      left mastectomy    CARPAL TUNNEL RELEASE      Bilateral    FINGER CONTRACTURE SURGERY      HYSTERECTOMY  2005    USO    TONSILLECTOMY      UPPER GASTROINTESTINAL ENDOSCOPY  2019    stretched esophagous          CURRENTMEDICATIONS       Previous Medications    ASPIRIN 81 MG EC TABLET    Take 81 mg by mouth daily    B-D UF III MINI PEN NEEDLES 31G X 5 MM MISC    use five times daily with insulin    BENZTROPINE (COGENTIN) 1 MG TABLET    Take 1 mg by mouth nightly     BLOOD GLUCOSE TEST STRIPS (FREESTYLE LITE) STRIP    test blood sugar four times daily    CARIPRAZINE HCL (VRAYLAR) 1.5 MG CAPSULE    Take 1.5 mg by mouth daily    DIAZEPAM (VALIUM) 5 MG TABLET Take 5 mg by mouth 2 times daily as needed for Anxiety. DULOXETINE (CYMBALTA) 60 MG CAPSULE    Take 60 mg by mouth 2 times daily. ERTUGLIFLOZIN L-PYROGLUTAMICAC (STEGLATRO) 5 MG TABS    TAKE 1 TABLET BY MOUTH ONE TIME A DAY    FEBUXOSTAT 80 MG TABS    Take 80 mg by mouth daily    FERROUS SULFATE (IRON 325) 325 (65 FE) MG TABLET    TAKE 1 TABLET BY MOUTH TWO TIMES A DAY WITH MEALS    FOLIC ACID (FOLVITE) 1 MG TABLET    Take 1 mg by mouth daily    INSULIN DEGLUDEC (TRESIBA FLEXTOUCH) 200 UNIT/ML SOPN    inject 160 units subcutaneously once daily    KETOCONAZOLE (NIZORAL) 2 % SHAMPOO    Apply topically daily as needed. LEFLUNOMIDE (ARAVA) 10 MG TABLET    Take 10 mg by mouth daily     LEVOTHYROXINE (SYNTHROID) 150 MCG TABLET    Take 1 tablet by mouth every morning (before breakfast)    LIDOCAINE VISCOUS HCL (XYLOCAINE) 2 % SOLN SOLUTION    Take 5 mLs by mouth every 3 hours as needed for Irritation or Pain    LORATADINE (CLARITIN) 10 MG TABLET    TAKE 1 TABLET BY MOUTH ONE TIME A DAY     LUBIPROSTONE (AMITIZA) 24 MCG CAPSULE    Take 24 mcg by mouth daily (with breakfast)    MAGIC MOUTHWASH (MIRACLE MOUTHWASH)    Swish and spit 5 mLs 4 times daily as needed for Irritation or Pain    METOLAZONE (ZAROXOLYN) 2.5 MG TABLET    TAKE 1 TABLET BY MOUTH TWO TIMES A WEEK AS NEEDED for weight over 180 pounds. do not take two days in a row    METOPROLOL TARTRATE (LOPRESSOR) 25 MG TABLET    TAKE 1 TABLET BY MOUTH TWO TIMES A DAY     MONTELUKAST (SINGULAIR) 10 MG TABLET    TAKE 1 TABLET BY MOUTH ONE TIME A DAY     NITROGLYCERIN (NITROSTAT) 0.4 MG SL TABLET    Place 1 tablet under the tongue every 5 minutes as needed for Chest pain up to max of 3 total doses. If no relief after 1 dose, call 911.     NOVOLOG FLEXPEN 100 UNIT/ML INJECTION PEN    INJECT 30 TO 50 UNITS INTO THE SKIN THREE TIMES DAILY BEFORE MEALS    NYSTATIN (MYCOSTATIN) 010816 UNIT/ML SUSPENSION    Take 5 mLs by mouth 4 times daily    OMEPRAZOLE (PRILOSEC) 20 MG DELAYED RELEASE CAPSULE    TAKE 1 CAPSULE BY MOUTH TWO TIMES A DAY     OXYCODONE (OXYCONTIN) 20 MG EXTENDED RELEASE TABLET    Take 20 mg by mouth every 12 hours. OXYCODONE-ACETAMINOPHEN (PERCOCET)  MG PER TABLET    Take 1 tablet by mouth every 4 hours as needed for Pain . Earliest Fill Date: 17    OXYGEN    Inhale 3 L into the lungs nightly Sometimes with activity    PREGABALIN (LYRICA) 50 MG CAPSULE    Take 50 mg by mouth 2 times daily. RANOLAZINE (RANEXA) 500 MG EXTENDED RELEASE TABLET    TAKE 1 TABLET BY MOUTH TWO TIMES A DAY     SIMVASTATIN (ZOCOR) 20 MG TABLET    TAKE 1 TABLET BY MOUTH NIGHTLY     SPIRONOLACTONE (ALDACTONE) 50 MG TABLET    TAKE 2 TABLETS BY MOUTH IN THE MORNING AND ONE TABLET IN THE EVENING    SULFASALAZINE (AZULFIDINE) 500 MG TABLET    Take 500 mg by mouth 3 times daily     TIZANIDINE (ZANAFLEX) 2 MG TABLET    TAKE 1 TABLET BY MOUTH EVERY EIGHT HOURS AS NEEDED FOR PAIN AND SPASM    TORSEMIDE (DEMADEX) 100 MG TABLET    TAKE 1 TABLET BY MOUTH IN THE MORNING AND 1/2 TABLET IN THE EVENING     VITAMIN D3 (CHOLECALCIFEROL) 25 MCG (1000 UT) TABS TABLET    TAKE 3 TABLETS BY MOUTH ONE TIME A DAY         ALLERGIES     Iv dye [iodides];  Diazepam; and Trazodone and nefazodone    FAMILYHISTORY       Family History   Problem Relation Age of Onset    Asthma Other     Cancer Other     Depression Other     Diabetes Other     Hypertension Other     High Cholesterol Other     Migraines Other     Heart Attack Father 72         of MI    High Blood Pressure Mother     Diabetes Mother           SOCIAL HISTORY       Social History     Tobacco Use    Smoking status: Never Smoker    Smokeless tobacco: Never Used   Substance Use Topics    Alcohol use: No    Drug use: No       SCREENINGS             PHYSICAL EXAM    (up to 7 for level 4, 8 or more for level 5)     ED Triage Vitals [10/28/20 1620]   BP Temp Temp Source Pulse Resp SpO2 Height Weight   (!) 155/89 99.2 °F (37.3 °C) Oral 98 20 95 % 5' (1.524 m) 185 lb (83.9 kg)       Physical Exam  Vitals signs and nursing note reviewed. Constitutional:       General: She is not in acute distress. Appearance: She is well-developed. She is ill-appearing. She is not toxic-appearing or diaphoretic. HENT:      Head: Normocephalic and atraumatic. Right Ear: External ear normal.      Left Ear: External ear normal.      Nose: Nose normal.   Eyes:      General:         Right eye: No discharge. Left eye: No discharge. Extraocular Movements: Extraocular movements intact. Conjunctiva/sclera: Conjunctivae normal.      Pupils: Pupils are equal, round, and reactive to light. Neck:      Musculoskeletal: Normal range of motion and neck supple. Cardiovascular:      Rate and Rhythm: Normal rate and regular rhythm. Heart sounds: Normal heart sounds. Pulmonary:      Effort: Pulmonary effort is normal. No respiratory distress. Breath sounds: No wheezing, rhonchi or rales. Chest:      Chest wall: No tenderness. Abdominal:      General: There is no distension. Palpations: Abdomen is soft. Tenderness: There is no abdominal tenderness. Musculoskeletal: Normal range of motion. Skin:     General: Skin is warm and dry. Coloration: Skin is jaundiced. Neurological:      Mental Status: She is lethargic. GCS: GCS eye subscore is 4. GCS verbal subscore is 5. GCS motor subscore is 6. Cranial Nerves: Cranial nerves are intact.    Psychiatric:         Behavior: Behavior normal.         DIAGNOSTIC RESULTS   LABS:    Labs Reviewed   BASIC METABOLIC PANEL W/ REFLEX TO MG FOR LOW K - Abnormal; Notable for the following components:       Result Value    Chloride 90 (*)     CO2 34 (*)     Glucose 187 (*)     BUN 28 (*)     CREATININE 1.2 (*)     GFR Non- 47 (*)     GFR  57 (*)     All other components within normal limits    Narrative:     Performed at:  [facility]  [address],  [place], [address] [employer]   Phone [phone]   BLOOD GAS, VENOUS - Abnormal; Notable for the following components:    pCO2, Jurgen 54.9 (*)     pO2, Jurgen 93.5 (*)     HCO3, Venous 36.4 (*)     Base Excess, Jurgen 10.1 (*)     All other components within normal limits    Narrative:     Performed at:  [facility] [address]  [place], [address] [employer]   Phone [phone]   HEPATIC FUNCTION PANEL - Abnormal; Notable for the following components:     Total Protein 8.7 (*)     All other components within normal limits    Narrative:     Performed at:  [facility] [address]  [place], [address]   Phone [phone]   ACETAMINOPHEN LEVEL - Abnormal; Notable for the following components:    Acetaminophen Level <5 (*)     All other components within normal limits    Narrative:     Performed at:  [facility] [address]  [place], [address]   Phone [phone]   CULTURE, BLOOD 1   CULTURE, BLOOD 2   CBC WITH AUTO DIFFERENTIAL    Narrative:     Performed at:  [facility] [address]  [place], [address]   Phone [phone]   BRAIN NATRIURETIC PEPTIDE    Narrative:     Performed at:  [facility] [address]  [place], [address]   Phone [phone]   TROPONIN    Narrative:     Performed at:  [facility] [address]  [place], [address]   Phone [phone]   LACTIC ACID, PLASMA    Narrative:     Performed at:  [facility] [address], [address]   Phone [phone]   LIPASE    Narrative:     Performed at:  [facility] [address]  [place], [address]   Phone [phone]   APTT    Narrative:     Performed at:  [facility]  [address] [facility] [address], [place]   Phone [phone]   PROTIME-INR    Narrative:     Performed at:  [facility] [address]   Phone [phone]   AMMONIA    Narrative:     Performed at:  [facility]  [address]   Phone [phone]   ETHANOL    Narrative:     Performed at:  [facility] [address]   Phone [phone]       All other labs were within normal range or not returned as of this dictation. EKG: All EKG's are interpreted by the Emergency Department Physician in the absence of a cardiologist.  Please see their note for interpretation of EKG. RADIOLOGY:   Non-plain film images such as CT, Ultrasound and MRI are read by the radiologist. Plain radiographic images are visualized and preliminarily interpreted by the ED Provider with the below findings:        Interpretation per the Radiologist below, if available at the time of this note:    XR CHEST PORTABLE   Final Result   1. Patchy bibasilar opacities may represent multifocal pneumonia or pulmonary   edema. 2. Mild pulmonary edema. No results found.         PROCEDURES   Unless otherwise noted below, none     Procedures    CRITICAL CARE TIME   N/A    CONSULTS:  IP CONSULT TO HOSPITALIST      EMERGENCY DEPARTMENT COURSE and DIFFERENTIAL DIAGNOSIS/MDM:   Vitals:    Vitals:    10/28/20 1730 10/28/20 1745 10/28/20 1800 10/28/20 1815   BP: 133/70  124/74 128/72   Pulse: 79 88 78 76   Resp: 9 17 12 14   Temp:       TempSrc:       SpO2: 97% 100% 97% 96%   Weight:       Height:           Patient was given the following medications:  Medications   0.9 % sodium chloride bolus (has no administration in time range)   azithromycin (ZITHROMAX) 500 mg in D5W 250ml Vial Mate (has no administration in time range)   cefTRIAXone (ROCEPHIN) 1 g in sterile water 10 mL IV syringe (has no administration in time range)   dexamethasone (DECADRON) 20 mg in sodium chloride 0.9 % IVPB (has no administration in time range)   ketorolac (TORADOL) injection 30 mg (has no administration in time range)           Patient presents for evaluation of shortness of breath, nausea vomiting. On exam, she is ill-appearing, lethargic, slightly jaundiced. GCS 15. Cranial nerves II through XII are intact. Vitals are stable and she is afebrile. She seems generally weak with no focal neurologic deficits. Able to move all extremities. She has lymphedema to left upper extremity. Mucous membranes are dry. She will not talk with is answering questions with yes or no. Baseline is unknown. There is no last known normal.  Lungs clear to auscultation bilaterally, chest is nontender and abdomen is benign. Status post left mastectomy. Please see attending note for EKG interpretation. CBC and BMP are remarkable for mild acute kidney injury the creatinine 1.2, GFR 47. She separately seen with no evidence of diabetic ketoacidosis. There is no leukocytosis or lymphopenia. BNP 82. Troponin is negative. Lactic acid 1.3. Blood cultures are pending. LFTs within normal limits. Lipase 18. Coags within normal limits. Ammonia is 20. Acetaminophen and ethanol's are negative. Chest x-ray shows patchy bibasilar opacities likely representing multifocal pneumonia or pulmonary edema. Due to symptomology, reported fevers she likely has Covid. Swab is pending but she will be covered empirically for antibiotic therapy with azithromycin and Rocephin. She will be admitted for further evaluation management of this as well as new hypoxia with oxygen requirement. Hospitalist will resume care the patient at this time. Patient was informed and agreeable. She is stable for admission.       Critical Care  There was a high probability of life-threatening clinical deterioration in the patient's condition requiring my urgent intervention. Total critical care time with the patient was 35 minutes excluding separately reportable procedures. Critical care required due to patients presentation and concern for acute life-threatening disease process, Covid pneumonia with hypoxia new oxygen requirement decompensation. FINAL IMPRESSION      1. Suspected COVID-19 virus infection    2. Pneumonia of both lower lobes due to infectious organism    3. Acute respiratory failure with hypoxia [facility])          DISPOSITION/PLAN   DISPOSITION Decision To Admit 10/28/2020 06:44:25 PM      PATIENT REFERREDTO:  No follow-up provider specified.     DISCHARGE MEDICATIONS:  New Prescriptions    No medications on file       DISCONTINUED MEDICATIONS:  Discontinued Medications    ALBUTEROL (PROVENTIL) (2.5 MG/3ML) 0.083% NEBULIZER SOLUTION    Take 3 mLs by nebulization every 6 hours as needed for Wheezing    ALBUTEROL SULFATE  (90 BASE) MCG/ACT INHALER    Inhale 2 puffs into the lungs every 6 hours as needed for Wheezing    BUTALBITAL-ACETAMINOPHEN-CAFFEINE (FIORICET, ESGIC) -40 MG PER TABLET    Take 1 tablet by mouth every 6 hours as needed for Headaches    CALCIUM CARBONATE-VITAMIN D (CALCIUM 600+D) 600-200 MG-UNIT TABS    Take 1 tablet by mouth 2 times daily    GLIPIZIDE (GLUCOTROL XL) 10 MG EXTENDED RELEASE TABLET    Take 10 mg by mouth 2 times daily    GLUCAGON 1 MG/0.2ML SOSY    To be used in case of severe hypoglycemia    SENNA-DOCUSATE (STOOL SOFTENER/LAXATIVE) 8.6-50 MG PER TABLET    TAKE 2 TABLETS BY MOUTH TWO TIMES A DAY              (Please note that portions of this note were completed with a voice recognition program.  Efforts were made to edit the dictations but occasionally words are mis-transcribed.)    Marlowe Peabody, PA-C (electronically signed)           Robin Ceron, [place]  10/28/20 8251

## 2020-10-28 NOTE — ED NOTES
Pt's  requested a female nurse to take care of the patient due to L[employer] preferences. Pt was very respectful and was told that we would accommodate this. Charge nurse notified. Kat RN will assume care at this time.      Nicole Johnson, RN  10/28/20 6422

## 2020-10-28 NOTE — ED NOTES
Pt complains of increase weakness since yesterday, HA , chest pain and body aches. Pt noted with edema to right hand, pt  states this is normal for pt. Pt has 20 ga piv to right ac. Blood sent to lab. Pt is on a continuous pulse oximetry and telemetry monitoring. Pt continued on cycling blood pressure. Fall risk precautions in place, call light in reach, bed side table within reach,, will continue to monitor.          Yuko Doe, [address] [place] [address]  10/28/20 4426

## 2020-10-29 PROBLEM — J96.01 ACUTE HYPOXEMIC RESPIRATORY FAILURE (HCC): Status: ACTIVE | Noted: 2020-10-29

## 2020-10-29 LAB
A/G RATIO: 0.9 (ref 1.1–2.2)
A/G RATIO: 1 (ref 1.1–2.2)
ALBUMIN SERPL-MCNC: 4 G/DL (ref 3.4–5)
ALBUMIN SERPL-MCNC: 4 G/DL (ref 3.4–5)
ALP BLD-CCNC: 103 U/L (ref 40–129)
ALP BLD-CCNC: 111 U/L (ref 40–129)
ALT SERPL-CCNC: 17 U/L (ref 10–40)
ALT SERPL-CCNC: 18 U/L (ref 10–40)
ANION GAP SERPL CALCULATED.3IONS-SCNC: 12 MMOL/L (ref 3–16)
ANION GAP SERPL CALCULATED.3IONS-SCNC: 12 MMOL/L (ref 3–16)
ANION GAP SERPL CALCULATED.3IONS-SCNC: 13 MMOL/L (ref 3–16)
AST SERPL-CCNC: 26 U/L (ref 15–37)
AST SERPL-CCNC: 29 U/L (ref 15–37)
BASOPHILS ABSOLUTE: 0 K/UL (ref 0–0.2)
BASOPHILS RELATIVE PERCENT: 0.2 %
BILIRUB SERPL-MCNC: <0.2 MG/DL (ref 0–1)
BILIRUB SERPL-MCNC: <0.2 MG/DL (ref 0–1)
BILIRUBIN URINE: NEGATIVE
BLOOD, URINE: NEGATIVE
BUN BLDV-MCNC: 29 MG/DL (ref 7–20)
BUN BLDV-MCNC: 32 MG/DL (ref 7–20)
BUN BLDV-MCNC: 45 MG/DL (ref 7–20)
C-REACTIVE PROTEIN: 25.5 MG/L (ref 0–5.1)
CALCIUM SERPL-MCNC: 9 MG/DL (ref 8.3–10.6)
CALCIUM SERPL-MCNC: 9.7 MG/DL (ref 8.3–10.6)
CALCIUM SERPL-MCNC: 9.9 MG/DL (ref 8.3–10.6)
CHLORIDE BLD-SCNC: 88 MMOL/L (ref 99–110)
CHLORIDE BLD-SCNC: 90 MMOL/L (ref 99–110)
CHLORIDE BLD-SCNC: 92 MMOL/L (ref 99–110)
CLARITY: CLEAR
CO2: 31 MMOL/L (ref 21–32)
CO2: 31 MMOL/L (ref 21–32)
CO2: 32 MMOL/L (ref 21–32)
COLOR: YELLOW
CREAT SERPL-MCNC: 1.2 MG/DL (ref 0.6–1.1)
CREAT SERPL-MCNC: 1.2 MG/DL (ref 0.6–1.1)
CREAT SERPL-MCNC: 1.6 MG/DL (ref 0.6–1.1)
D DIMER: 242 NG/ML DDU (ref 0–229)
D DIMER: 248 NG/ML DDU (ref 0–229)
EKG ATRIAL RATE: 88 BPM
EKG DIAGNOSIS: NORMAL
EKG P AXIS: 21 DEGREES
EKG P-R INTERVAL: 138 MS
EKG Q-T INTERVAL: 386 MS
EKG QRS DURATION: 80 MS
EKG QTC CALCULATION (BAZETT): 467 MS
EKG R AXIS: 21 DEGREES
EKG T AXIS: 34 DEGREES
EKG VENTRICULAR RATE: 88 BPM
EOSINOPHILS ABSOLUTE: 0 K/UL (ref 0–0.6)
EOSINOPHILS RELATIVE PERCENT: 0 %
ESTIMATED AVERAGE GLUCOSE: 205.9 MG/DL
FIBRINOGEN: 729 MG/DL (ref 200–397)
FIBRINOGEN: 753 MG/DL (ref 200–397)
GFR AFRICAN AMERICAN: 41
GFR AFRICAN AMERICAN: 57
GFR AFRICAN AMERICAN: 57
GFR NON-AFRICAN AMERICAN: 34
GFR NON-AFRICAN AMERICAN: 47
GFR NON-AFRICAN AMERICAN: 47
GLOBULIN: 4 G/DL
GLOBULIN: 4.3 G/DL
GLUCOSE BLD-MCNC: 211 MG/DL (ref 70–99)
GLUCOSE BLD-MCNC: 216 MG/DL (ref 70–99)
GLUCOSE BLD-MCNC: 270 MG/DL (ref 70–99)
GLUCOSE BLD-MCNC: 304 MG/DL (ref 70–99)
GLUCOSE BLD-MCNC: 317 MG/DL (ref 70–99)
GLUCOSE BLD-MCNC: 329 MG/DL (ref 70–99)
GLUCOSE BLD-MCNC: 335 MG/DL (ref 70–99)
GLUCOSE URINE: 500 MG/DL
HBA1C MFR BLD: 8.8 %
HCT VFR BLD CALC: 39.1 % (ref 36–48)
HEMOGLOBIN: 12.7 G/DL (ref 12–16)
INR BLD: 0.91 (ref 0.86–1.14)
INR BLD: 0.91 (ref 0.86–1.14)
KETONES, URINE: NEGATIVE MG/DL
LACTATE DEHYDROGENASE: 182 U/L (ref 100–190)
LACTIC ACID: 1.1 MMOL/L (ref 0.4–2)
LEUKOCYTE ESTERASE, URINE: NEGATIVE
LYMPHOCYTES ABSOLUTE: 1.2 K/UL (ref 1–5.1)
LYMPHOCYTES RELATIVE PERCENT: 14.1 %
MCH RBC QN AUTO: 29.2 PG (ref 26–34)
MCHC RBC AUTO-ENTMCNC: 32.6 G/DL (ref 31–36)
MCV RBC AUTO: 89.7 FL (ref 80–100)
MICROSCOPIC EXAMINATION: ABNORMAL
MONOCYTES ABSOLUTE: 0.1 K/UL (ref 0–1.3)
MONOCYTES RELATIVE PERCENT: 0.8 %
NEUTROPHILS ABSOLUTE: 7.2 K/UL (ref 1.7–7.7)
NEUTROPHILS RELATIVE PERCENT: 84.9 %
NITRITE, URINE: NEGATIVE
PDW BLD-RTO: 14.9 % (ref 12.4–15.4)
PERFORMED ON: ABNORMAL
PH UA: 8 (ref 5–8)
PLATELET # BLD: 269 K/UL (ref 135–450)
PMV BLD AUTO: 8.4 FL (ref 5–10.5)
POTASSIUM REFLEX MAGNESIUM: 4.4 MMOL/L (ref 3.5–5.1)
POTASSIUM REFLEX MAGNESIUM: 4.4 MMOL/L (ref 3.5–5.1)
POTASSIUM SERPL-SCNC: 4.4 MMOL/L (ref 3.5–5.1)
PROCALCITONIN: 0.07 NG/ML (ref 0–0.15)
PROTEIN UA: NEGATIVE MG/DL
PROTHROMBIN TIME: 10.6 SEC (ref 10–13.2)
PROTHROMBIN TIME: 10.6 SEC (ref 10–13.2)
RBC # BLD: 4.36 M/UL (ref 4–5.2)
SARS-COV-2, PCR: NOT DETECTED
SODIUM BLD-SCNC: 131 MMOL/L (ref 136–145)
SODIUM BLD-SCNC: 134 MMOL/L (ref 136–145)
SODIUM BLD-SCNC: 136 MMOL/L (ref 136–145)
SPECIFIC GRAVITY UA: 1.01 (ref 1–1.03)
T4 FREE: 1 NG/DL (ref 0.9–1.8)
TOTAL PROTEIN: 8 G/DL (ref 6.4–8.2)
TOTAL PROTEIN: 8.3 G/DL (ref 6.4–8.2)
TROPONIN: <0.01 NG/ML
TSH SERPL DL<=0.05 MIU/L-ACNC: 0.91 UIU/ML (ref 0.27–4.2)
URINE TYPE: ABNORMAL
UROBILINOGEN, URINE: 0.2 E.U./DL
WBC # BLD: 8.5 K/UL (ref 4–11)

## 2020-10-29 PROCEDURE — 6360000002 HC RX W HCPCS: Performed by: INTERNAL MEDICINE

## 2020-10-29 PROCEDURE — 2580000003 HC RX 258: Performed by: HOSPITALIST

## 2020-10-29 PROCEDURE — 6370000000 HC RX 637 (ALT 250 FOR IP): Performed by: NURSE PRACTITIONER

## 2020-10-29 PROCEDURE — 36415 COLL VENOUS BLD VENIPUNCTURE: CPT

## 2020-10-29 PROCEDURE — 94640 AIRWAY INHALATION TREATMENT: CPT

## 2020-10-29 PROCEDURE — 2060000000 HC ICU INTERMEDIATE R&B

## 2020-10-29 PROCEDURE — 6360000002 HC RX W HCPCS: Performed by: HOSPITALIST

## 2020-10-29 PROCEDURE — 6370000000 HC RX 637 (ALT 250 FOR IP): Performed by: HOSPITALIST

## 2020-10-29 PROCEDURE — 85025 COMPLETE CBC W/AUTO DIFF WBC: CPT

## 2020-10-29 PROCEDURE — 94669 MECHANICAL CHEST WALL OSCILL: CPT

## 2020-10-29 PROCEDURE — 80053 COMPREHEN METABOLIC PANEL: CPT

## 2020-10-29 PROCEDURE — 85610 PROTHROMBIN TIME: CPT

## 2020-10-29 PROCEDURE — 2700000000 HC OXYGEN THERAPY PER DAY

## 2020-10-29 PROCEDURE — 84439 ASSAY OF FREE THYROXINE: CPT

## 2020-10-29 PROCEDURE — 99254 IP/OBS CNSLTJ NEW/EST MOD 60: CPT | Performed by: INTERNAL MEDICINE

## 2020-10-29 PROCEDURE — 85379 FIBRIN DEGRADATION QUANT: CPT

## 2020-10-29 PROCEDURE — 84443 ASSAY THYROID STIM HORMONE: CPT

## 2020-10-29 PROCEDURE — 81003 URINALYSIS AUTO W/O SCOPE: CPT

## 2020-10-29 PROCEDURE — 6370000000 HC RX 637 (ALT 250 FOR IP): Performed by: INTERNAL MEDICINE

## 2020-10-29 PROCEDURE — 94761 N-INVAS EAR/PLS OXIMETRY MLT: CPT

## 2020-10-29 PROCEDURE — 51702 INSERT TEMP BLADDER CATH: CPT

## 2020-10-29 PROCEDURE — 85384 FIBRINOGEN ACTIVITY: CPT

## 2020-10-29 PROCEDURE — 99223 1ST HOSP IP/OBS HIGH 75: CPT | Performed by: INTERNAL MEDICINE

## 2020-10-29 PROCEDURE — 93010 ELECTROCARDIOGRAM REPORT: CPT | Performed by: INTERNAL MEDICINE

## 2020-10-29 RX ORDER — DEXTROSE MONOHYDRATE 50 MG/ML
100 INJECTION, SOLUTION INTRAVENOUS PRN
Status: DISCONTINUED | OUTPATIENT
Start: 2020-10-29 | End: 2020-10-31 | Stop reason: HOSPADM

## 2020-10-29 RX ORDER — NICOTINE POLACRILEX 4 MG
15 LOZENGE BUCCAL PRN
Status: DISCONTINUED | OUTPATIENT
Start: 2020-10-29 | End: 2020-10-31 | Stop reason: HOSPADM

## 2020-10-29 RX ORDER — INSULIN LISPRO 100 [IU]/ML
15 INJECTION, SOLUTION INTRAVENOUS; SUBCUTANEOUS
Status: DISCONTINUED | OUTPATIENT
Start: 2020-10-29 | End: 2020-10-30

## 2020-10-29 RX ORDER — FUROSEMIDE 10 MG/ML
80 INJECTION INTRAMUSCULAR; INTRAVENOUS ONCE
Status: COMPLETED | OUTPATIENT
Start: 2020-10-29 | End: 2020-10-29

## 2020-10-29 RX ORDER — BUDESONIDE AND FORMOTEROL FUMARATE DIHYDRATE 160; 4.5 UG/1; UG/1
2 AEROSOL RESPIRATORY (INHALATION) 2 TIMES DAILY
Status: DISCONTINUED | OUTPATIENT
Start: 2020-10-29 | End: 2020-10-31 | Stop reason: HOSPADM

## 2020-10-29 RX ORDER — DEXTROSE MONOHYDRATE 25 G/50ML
12.5 INJECTION, SOLUTION INTRAVENOUS PRN
Status: DISCONTINUED | OUTPATIENT
Start: 2020-10-29 | End: 2020-10-31 | Stop reason: HOSPADM

## 2020-10-29 RX ADMIN — RANOLAZINE 500 MG: 500 TABLET, FILM COATED, EXTENDED RELEASE ORAL at 20:54

## 2020-10-29 RX ADMIN — Medication 2 PUFF: at 12:28

## 2020-10-29 RX ADMIN — FERROUS SULFATE TAB 325 MG (65 MG ELEMENTAL FE) 325 MG: 325 (65 FE) TAB at 17:19

## 2020-10-29 RX ADMIN — TORSEMIDE 50 MG: 100 TABLET ORAL at 00:15

## 2020-10-29 RX ADMIN — NYSTATIN 500000 UNITS: 100000 SUSPENSION ORAL at 00:19

## 2020-10-29 RX ADMIN — NYSTATIN 500000 UNITS: 100000 SUSPENSION ORAL at 13:00

## 2020-10-29 RX ADMIN — NYSTATIN 500000 UNITS: 100000 SUSPENSION ORAL at 09:07

## 2020-10-29 RX ADMIN — ATORVASTATIN CALCIUM 20 MG: 20 TABLET, FILM COATED ORAL at 20:54

## 2020-10-29 RX ADMIN — TORSEMIDE 50 MG: 100 TABLET ORAL at 09:08

## 2020-10-29 RX ADMIN — DEXAMETHASONE SODIUM PHOSPHATE 6 MG: 10 INJECTION, SOLUTION INTRAMUSCULAR; INTRAVENOUS at 09:10

## 2020-10-29 RX ADMIN — OXYCODONE HYDROCHLORIDE 20 MG: 10 TABLET, FILM COATED, EXTENDED RELEASE ORAL at 20:54

## 2020-10-29 RX ADMIN — INSULIN GLARGINE 75 UNITS: 100 INJECTION, SOLUTION SUBCUTANEOUS at 21:04

## 2020-10-29 RX ADMIN — DULOXETINE HYDROCHLORIDE 60 MG: 60 CAPSULE, DELAYED RELEASE ORAL at 09:10

## 2020-10-29 RX ADMIN — Medication 2 PUFF: at 00:38

## 2020-10-29 RX ADMIN — LEVOTHYROXINE SODIUM 150 MCG: 0.15 TABLET ORAL at 05:32

## 2020-10-29 RX ADMIN — ENOXAPARIN SODIUM 30 MG: 30 INJECTION SUBCUTANEOUS at 09:11

## 2020-10-29 RX ADMIN — INSULIN LISPRO 12 UNITS: 100 INJECTION, SOLUTION INTRAVENOUS; SUBCUTANEOUS at 08:00

## 2020-10-29 RX ADMIN — PREGABALIN 50 MG: 50 CAPSULE ORAL at 20:54

## 2020-10-29 RX ADMIN — Medication 10 ML: at 20:54

## 2020-10-29 RX ADMIN — ASPIRIN 81 MG: 81 TABLET, COATED ORAL at 09:10

## 2020-10-29 RX ADMIN — PREGABALIN 50 MG: 50 CAPSULE ORAL at 09:30

## 2020-10-29 RX ADMIN — OXYCODONE HYDROCHLORIDE 20 MG: 10 TABLET, FILM COATED, EXTENDED RELEASE ORAL at 00:15

## 2020-10-29 RX ADMIN — Medication 2 PUFF: at 09:29

## 2020-10-29 RX ADMIN — Medication 2 PUFF: at 16:24

## 2020-10-29 RX ADMIN — OXYCODONE HYDROCHLORIDE 20 MG: 10 TABLET, FILM COATED, EXTENDED RELEASE ORAL at 09:30

## 2020-10-29 RX ADMIN — RANOLAZINE 500 MG: 500 TABLET, FILM COATED, EXTENDED RELEASE ORAL at 00:15

## 2020-10-29 RX ADMIN — INSULIN LISPRO 12 UNITS: 100 INJECTION, SOLUTION INTRAVENOUS; SUBCUTANEOUS at 12:00

## 2020-10-29 RX ADMIN — NYSTATIN 500000 UNITS: 100000 SUSPENSION ORAL at 16:48

## 2020-10-29 RX ADMIN — FOLIC ACID 1 MG: 1 TABLET ORAL at 09:09

## 2020-10-29 RX ADMIN — METOPROLOL TARTRATE 25 MG: 25 TABLET, FILM COATED ORAL at 09:08

## 2020-10-29 RX ADMIN — Medication 2 PUFF: at 19:25

## 2020-10-29 RX ADMIN — SPIRONOLACTONE 25 MG: 25 TABLET ORAL at 09:10

## 2020-10-29 RX ADMIN — BENZTROPINE MESYLATE 1 MG: 1 TABLET ORAL at 20:54

## 2020-10-29 RX ADMIN — BENZTROPINE MESYLATE 1 MG: 1 TABLET ORAL at 00:15

## 2020-10-29 RX ADMIN — INSULIN LISPRO 15 UNITS: 100 INJECTION, SOLUTION INTRAVENOUS; SUBCUTANEOUS at 16:50

## 2020-10-29 RX ADMIN — MONTELUKAST SODIUM 10 MG: 10 TABLET, COATED ORAL at 09:07

## 2020-10-29 RX ADMIN — FUROSEMIDE 80 MG: 10 INJECTION, SOLUTION INTRAMUSCULAR; INTRAVENOUS at 14:45

## 2020-10-29 RX ADMIN — RANOLAZINE 500 MG: 500 TABLET, FILM COATED, EXTENDED RELEASE ORAL at 09:38

## 2020-10-29 RX ADMIN — INSULIN LISPRO 3 UNITS: 100 INJECTION, SOLUTION INTRAVENOUS; SUBCUTANEOUS at 00:30

## 2020-10-29 RX ADMIN — PANTOPRAZOLE SODIUM 40 MG: 40 TABLET, DELAYED RELEASE ORAL at 05:32

## 2020-10-29 RX ADMIN — Medication 2 PUFF: at 12:30

## 2020-10-29 RX ADMIN — ATORVASTATIN CALCIUM 20 MG: 20 TABLET, FILM COATED ORAL at 00:15

## 2020-10-29 RX ADMIN — Medication 10 ML: at 00:20

## 2020-10-29 RX ADMIN — METOPROLOL TARTRATE 25 MG: 25 TABLET, FILM COATED ORAL at 20:54

## 2020-10-29 RX ADMIN — PREGABALIN 50 MG: 50 CAPSULE ORAL at 00:15

## 2020-10-29 RX ADMIN — Medication 2 PUFF: at 00:37

## 2020-10-29 RX ADMIN — FERROUS SULFATE TAB 325 MG (65 MG ELEMENTAL FE) 325 MG: 325 (65 FE) TAB at 08:30

## 2020-10-29 RX ADMIN — NYSTATIN 500000 UNITS: 100000 SUSPENSION ORAL at 20:53

## 2020-10-29 RX ADMIN — INSULIN GLARGINE 75 UNITS: 100 INJECTION, SOLUTION SUBCUTANEOUS at 14:07

## 2020-10-29 RX ADMIN — INSULIN LISPRO 9 UNITS: 100 INJECTION, SOLUTION INTRAVENOUS; SUBCUTANEOUS at 16:44

## 2020-10-29 RX ADMIN — INSULIN LISPRO 6 UNITS: 100 INJECTION, SOLUTION INTRAVENOUS; SUBCUTANEOUS at 21:03

## 2020-10-29 RX ADMIN — VANCOMYCIN HYDROCHLORIDE 1250 MG: 10 INJECTION, POWDER, LYOPHILIZED, FOR SOLUTION INTRAVENOUS at 00:22

## 2020-10-29 RX ADMIN — ENOXAPARIN SODIUM 30 MG: 30 INJECTION SUBCUTANEOUS at 20:54

## 2020-10-29 ASSESSMENT — PAIN SCALES - GENERAL
PAINLEVEL_OUTOF10: 0
PAINLEVEL_OUTOF10: 0
PAINLEVEL_OUTOF10: 6
PAINLEVEL_OUTOF10: 6
PAINLEVEL_OUTOF10: 7
PAINLEVEL_OUTOF10: 8
PAINLEVEL_OUTOF10: 7
PAINLEVEL_OUTOF10: 7

## 2020-10-29 ASSESSMENT — PAIN DESCRIPTION - PAIN TYPE
TYPE: CHRONIC PAIN

## 2020-10-29 ASSESSMENT — PAIN DESCRIPTION - LOCATION
LOCATION: GENERALIZED

## 2020-10-29 NOTE — CARE COORDINATION
CM attempted to contact both patient's spouse and son for discharge planning assessment. Patient is Turkish speaking. Patient was last evaluated by CM in March of this year, at that time her  was her primary caregiver. Patient has a baseline oxygen at [facility], Primary care MD is Dr Hakan Chang. CM will follow for completion of assessment and any potential needs.     CARL JonesN, CCM, RN  [facility]  [phone]

## 2020-10-29 NOTE — PROGRESS NOTES
[address] [phone] was active with this pt prior to admit. Discharge planner notified. Will follow.

## 2020-10-29 NOTE — CONSULTS
PULMONARY AND CRITICAL CARE MEDICINE CONSULTATION NOTE    CONSULTING PHYSICIAN:  Aman Chin MD    ADMISSION DATE: 10/28/2020  ADMISSION DIAGNOSIS: Acute hypoxemic respiratory failure due to severe acute respiratory syndrome coronavirus 2 (SARS-CoV-2) disease ([facility]) [U07.1, J96.01]  Acute hypoxemic respiratory failure ([facility] [address] [J96.01]    REASON FOR CONSULT:   Chief Complaint   Patient presents with    Shortness of Breath     Pt arrived via [facility] from home. [facility] call was for N/V, pt 90% RA. Speaks Thai. DATE OF CONSULT: 10/28/2020    HISTORY OF PRESENT ILLNESS: 45y.o. year old female with a past medical history significant for asthma, anxiety, coronary artery disease, left breast cancer, coronary artery disease, congestive heart failure, chronic kidney disease, diabetes, hypertension who presented to the hospital with progressively worsening shortness of breath associated with extreme fatigue. Patient was reporting multiple episodes of nausea, vomiting and diarrhea. She was seen to be tachypneic with low oxygen saturation on room air. She did report of chest tightness associated with headache and cough. Cough is productive of whitish expectoration. Patient denies any hemoptysis or fevers at home. Old medical records reveal that the patient suffers from multiple medical comorbidities including chronic diastolic heart failure, uncontrolled IDDM, CAD, breast cancer status post resection with LUE lymphedema, hypothyroidism, LUIS, renal insufficiency, prior CVA with left-sided deficit and oropharyngeal dysphagia. At the time of interview patient is lying in bed and appears depressed. She is tearful. Reports that her breathing is slightly better. Still feels short of breath. REVIEW OF SYSTEMS:     CONSTITUTIONAL SYMPTOMS: The patient denies fever, fatigue, night sweats, weight loss or weight gain. HEENT: No vision changes. No tinnitus, Denies sinus pain.  No hoarseness, or dysphagia. NECK: Patient denies swelling in the neck. CARDIOVASCULAR: Denies chest pain, palpitation, syncope. RESPIRATORY: As per HPI. GASTROINTESTINAL: Denies nausea, abdominal pain or change in bowel function. GENITOURINARY: Denies obstructive symptoms. No history of incontinence. BREASTS: No masses or lumps in the breasts. SKIN: No rashes or itching. MUSCULOSKELETAL: Denies weakness or bone pain. NEUROLOGICAL: No headaches or seizures. PSYCHIATRIC: Denies mood swings or depression. ENDOCRINE: Denies heat or cold intolerance or excessive thirst.  HEMATOLOGIC/LYMPHATIC: Denies easy bruising or lymph node swelling. ALLERGIC/IMMUNOLOGIC: No environmental allergies.     PAST MEDICAL HISTORY:   Past Medical History:   Diagnosis Date    Anxiety     Anxiety and depression     Arthritis     not sure of specific type    Asthma     CAD (coronary artery disease)     Cancer ([facility] [address] 2007    left breast    Cerebral artery occlusion with cerebral infarction ([facility] [address]     2000, 2001    CHF (congestive heart failure) ([facility] [address] 2018    Chronic kidney disease     renal insufficency/to see Dr Rea Catching    Chronic pain     Constipation     COPD (chronic obstructive pulmonary disease) ([facility] [address]     Depression     Diabetes mellitus ([facility] [address]     Diabetic polyneuropathy associated with type 2 diabetes mellitus ([facility] [address] 2/2/2018    Dysthymia 2/2/2018    ESBL (extended spectrum beta-lactamase) producing bacteria infection 03/14/2018    urine    Fibromyalgia     Gastric ulcer, unspecified as acute or chronic, without mention of hemorrhage, perforation, or obstruction     GERD (gastroesophageal reflux disease)     Gout     HIGH CHOLESTEROL     Hypertension     Hypothyroidism     Severe persistent asthma without complication 0/6/0763    Thyroid disease     TIA (transient ischemic attack)     with occasional left leg and hand weakness       PAST SURGICAL HISTORY:   Past Surgical History:   Procedure Laterality Date    BREAST SURGERY      left mastectomy    CARPAL TUNNEL RELEASE      Bilateral    FINGER CONTRACTURE SURGERY      HYSTERECTOMY  2005    USO    TONSILLECTOMY      UPPER GASTROINTESTINAL ENDOSCOPY  2019    stretched esophagous        SOCIAL HISTORY:   Social History     Tobacco Use    Smoking status: Never Smoker    Smokeless tobacco: Never Used   Substance Use Topics    Alcohol use: No    Drug use: No       FAMILY HISTORY:   Family History   Problem Relation Age of Onset    Asthma Other     Cancer Other     Depression Other     Diabetes Other     Hypertension Other     High Cholesterol Other     Migraines Other     Heart Attack Father 72         of MI    High Blood Pressure Mother     Diabetes Mother        MEDICATIONS:     No current facility-administered medications on file prior to encounter.       Current Outpatient Medications on File Prior to Encounter   Medication Sig Dispense Refill    simvastatin (ZOCOR) 20 MG tablet TAKE 1 TABLET BY MOUTH NIGHTLY  90 tablet 0    metoprolol tartrate (LOPRESSOR) 25 MG tablet TAKE 1 TABLET BY MOUTH TWO TIMES A DAY  180 tablet 0    Ertugliflozin L-PyroglutamicAc (STEGLATRO) 5 MG TABS TAKE 1 TABLET BY MOUTH ONE TIME A DAY 30 tablet 2    spironolactone (ALDACTONE) 50 MG tablet TAKE 2 TABLETS BY MOUTH IN THE MORNING AND ONE TABLET IN THE EVENING 90 tablet 5    omeprazole (PRILOSEC) 20 MG delayed release capsule TAKE 1 CAPSULE BY MOUTH TWO TIMES A DAY  60 capsule 5    loratadine (CLARITIN) 10 MG tablet TAKE 1 TABLET BY MOUTH ONE TIME A DAY  30 tablet 5    tiZANidine (ZANAFLEX) 2 MG tablet TAKE 1 TABLET BY MOUTH EVERY EIGHT HOURS AS NEEDED FOR PAIN AND SPASM 30 tablet 0    nystatin (MYCOSTATIN) 956378 UNIT/ML suspension Take 5 mLs by mouth 4 times daily 500 mL 1    Magic Mouthwash (MIRACLE MOUTHWASH) Swish and spit 5 mLs 4 times daily as needed for Irritation or Pain 300 mL 2    montelukast (SINGULAIR) 10 MG tablet TAKE 1 TABLET BY MOUTH ONE TIME A DAY  30 tablet 3    ferrous sulfate (IRON 325) 325 (65 Fe) MG tablet TAKE 1 TABLET BY MOUTH TWO TIMES A DAY WITH MEALS 180 tablet 0    lidocaine viscous hcl (XYLOCAINE) 2 % SOLN solution Take 5 mLs by mouth every 3 hours as needed for Irritation or Pain 100 mL 2    ketoconazole (NIZORAL) 2 % shampoo Apply topically daily as needed. 120 mL 1    Insulin Degludec (TRESIBA FLEXTOUCH) 200 UNIT/ML SOPN inject 160 units subcutaneously once daily 16 pen 3    NOVOLOG FLEXPEN 100 UNIT/ML injection pen INJECT 30 TO 50 UNITS INTO THE SKIN THREE TIMES DAILY BEFORE MEALS 30 mL 3    metOLazone (ZAROXOLYN) 2.5 MG tablet TAKE 1 TABLET BY MOUTH TWO TIMES A WEEK AS NEEDED for weight over 180 pounds. do not take two days in a row 60 tablet 0    nitroGLYCERIN (NITROSTAT) 0.4 MG SL tablet Place 1 tablet under the tongue every 5 minutes as needed for Chest pain up to max of 3 total doses. If no relief after 1 dose, call 911. 25 tablet 3    vitamin D3 (CHOLECALCIFEROL) 25 MCG (1000 UT) TABS tablet TAKE 3 TABLETS BY MOUTH ONE TIME A DAY 90 tablet 5    levothyroxine (SYNTHROID) 150 MCG tablet Take 1 tablet by mouth every morning (before breakfast) 90 tablet 3    diazePAM (VALIUM) 5 MG tablet Take 5 mg by mouth 2 times daily as needed for Anxiety.  cariprazine hcl (VRAYLAR) 1.5 MG capsule Take 1.5 mg by mouth daily      Febuxostat 80 MG TABS Take 80 mg by mouth daily      pregabalin (LYRICA) 50 MG capsule Take 50 mg by mouth 2 times daily.       lubiprostone (AMITIZA) 24 MCG capsule Take 24 mcg by mouth daily (with breakfast)      torsemide (DEMADEX) 100 MG tablet TAKE 1 TABLET BY MOUTH IN THE MORNING AND 1/2 TABLET IN THE EVENING  135 tablet 3    ranolazine (RANEXA) 500 MG extended release tablet TAKE 1 TABLET BY MOUTH TWO TIMES A DAY  180 tablet 3    leflunomide (ARAVA) 10 MG tablet Take 10 mg by mouth daily       OXYGEN Inhale 3 L into the lungs nightly Sometimes with activity      oxyCODONE (OXYCONTIN) 20 MG extended release tablet Take 20 mg by mouth every 12 hours.  aspirin 81 MG EC tablet Take 81 mg by mouth daily      sulfaSALAzine (AZULFIDINE) 500 MG tablet Take 500 mg by mouth 3 times daily       benztropine (COGENTIN) 1 MG tablet Take 1 mg by mouth nightly       folic acid (FOLVITE) 1 MG tablet Take 1 mg by mouth daily      oxyCODONE-acetaminophen (PERCOCET)  MG per tablet Take 1 tablet by mouth every 4 hours as needed for Pain . Earliest Fill Date: 6/8/17 100 tablet 0    duloxetine (CYMBALTA) 60 MG capsule Take 60 mg by mouth 2 times daily.         B-D UF III MINI PEN NEEDLES 31G X 5 MM MISC use five times daily with insulin 100 each 0    blood glucose test strips (FREESTYLE LITE) strip test blood sugar four times daily 200 strip 5        budesonide-formoterol  2 puff Inhalation BID    insulin glargine  75 Units Subcutaneous BID    insulin lispro  15 Units Subcutaneous TID WC    aspirin  81 mg Oral Daily    benztropine  1 mg Oral Nightly    cariprazine hcl  1.5 mg Oral Daily    DULoxetine  60 mg Oral Daily    Febuxostat  80 mg Oral Daily    ferrous sulfate  325 mg Oral BID WC    folic acid  1 mg Oral Daily    levothyroxine  150 mcg Oral QAM AC    metoprolol tartrate  25 mg Oral BID    montelukast  10 mg Oral Daily    nystatin  500,000 Units Oral 4x Daily    pantoprazole  40 mg Oral QAM AC    oxyCODONE  20 mg Oral BID    pregabalin  50 mg Oral BID    ranolazine  500 mg Oral BID    atorvastatin  20 mg Oral Nightly    enoxaparin  30 mg Subcutaneous BID    insulin lispro  0-18 Units Subcutaneous TID WC    insulin lispro  0-9 Units Subcutaneous Nightly    sodium chloride flush  10 mL Intravenous 2 times per day    albuterol sulfate HFA  2 puff Inhalation 4x daily    And    ipratropium  2 puff Inhalation 4x daily    dexamethasone  6 mg Intravenous Daily      dextrose      dextrose       perflutren lipid microspheres, glucose, dextrose, glucagon (rDNA), dextrose, nitroGLYCERIN, acetaminophen **OR** acetaminophen, glucose, dextrose, glucagon (rDNA), dextrose, sodium chloride flush, polyethylene glycol, ondansetron **OR** ondansetron, oxyCODONE      ALLERGIES:   Allergies as of 10/28/2020 - Review Complete 10/28/2020   Allergen Reaction Noted    Iv dye [iodides] Anaphylaxis 04/20/2011    Diazepam Other (See Comments) 04/05/2017    Trazodone and nefazodone Other (See Comments) 09/01/2011      OBJECTIVE:   height is 5' (1.524 m) and weight is 184 lb (83.5 kg). Her temporal temperature is 98 °F (36.7 °C). Her blood pressure is 102/60 and her pulse is 93. Her respiration is 16 and oxygen saturation is 93%. No intake/output data recorded. PHYSICAL EXAM:      CONSTITUTIONAL: She is a 45y.o.-year-old who appears her stated age. She is alert and oriented x 3 and in mild distress. HEENT: PERRLA, EOMI. No scleral icterus. No thrush, atraumatic, normocephalic. NECK: Supple, without cervical or supraclavicular lymphadenopathy:  CARDIOVASCULAR: Not done due to full PPE precautions. RESPIRATORY & CHEST: Not done due to full PPE precautions. GASTROINTESTINAL & ABDOMEN: Soft, nontender, positive bowel sounds in all quadrants, non-distended, without hepatosplenomegaly. GENITOURINARY: Deferred. MUSCULOSKELETAL: Left upper extremity has chronic lymphedema changes. SKIN OF BODY: No rash or jaundice. PSYCHIATRIC EVALUATION: Appears depressed and tearful. HEMATOLOGIC/LYMPHATIC/ IMMUNOLOGIC: No palpable lymphadenopathy. NEUROLOGIC: Alert and oriented x 3. Groslly non-focal. Motor strength is 5+/5 in all muscle groups. The patient has a normal sensorium globally.       LABS:  Recent Labs     10/28/20  1656 10/28/20  2344 10/29/20  0429   WBC 11.0 11.6* 8.5   HGB 13.1 13.0 12.7   HCT 40.2 39.6 39.1    270 269   ALT 19 18 17   AST 36 29 26    136 134*   K 4.2 4.4 4.4   CL 90* 92* 90*   CREATININE 1.2* 1.2* 1.2*   BUN 28* 29* 32*   CO2 34* 32 31   TSH  -- --  0.91   INR 0.86 0.91 0.91       Recent Labs     10/28/20  1656 10/28/20  2344 10/29/20  0429   GLUCOSE 187* 211* 304*   CALCIUM 10.2 9.9 9.7    136 134*   K 4.2 4.4 4.4   CO2 34* 32 31   CL 90* 92* 90*   BUN 28* 29* 32*   CREATININE 1.2* 1.2* 1.2*       No results for input(s): PHART, KKB4JKY, PO2ART, GSM8QCA, I0IPHGBX, BEART, G1CQHQNL in the last 72 hours. Lab Results   Component Value Date    INR 0.91 10/29/2020    INR 0.91 10/28/2020    INR 0.86 10/28/2020    PROTIME 10.6 10/29/2020    PROTIME 10.6 10/28/2020    PROTIME 10.0 10/28/2020     No results found for: AMYLASE   Lab Results   Component Value Date    LABA1C 8.8 10/28/2020     Lab Results   Component Value Date    .9 10/28/2020     Lab Results   Component Value Date    TSH 0.91 10/29/2020    T4FREE 1.0 10/29/2020     Lab Results   Component Value Date    TROPONINI <0.01 10/28/2020      Lab Results   Component Value Date    CRP 25.5 (H) 10/28/2020      Lab Results   Component Value Date    BNP 36 06/29/2020      Lab Results   Component Value Date    DDIMER 242 (H) 10/29/2020      Lab Results   Component Value Date    FERRITIN 57.5 02/07/2017      Lab Results   Component Value Date    LACTA 1.1 10/28/2020           IMAGING:    Chest x-ray portable 1 view done on 10/28/2020 was personally viewed by me and my interpretation is: Bibasilar patchy infiltrates seen. No pneumothorax or pleural effusion seen. Cardiac silhouette is within normal limits. IMPRESSION:     1. Acute respiratory distress  2. Mild hypoxic respiratory failure  3. Suspicion for COVID-19 infection  4. Asthma  5. Obesity  6. History of breast cancer s/p left mastectomy  7. Anxiety/depression    RECOMMENDATION:     1. We will continue to provide patient with oxygen supplementation to maintain SPO2 above 89%. Titrate FiO2 as tolerated. 2. Low suspicion for typical bacterial pneumonia as patient's procalcitonin levels are within normal ranges.   Will de-escalate antibiotics from vancomycin and meropenem to IV Rocephin and azithromycin. We will send sputum for culture. 3. Patient has been started on IV Decadron 6 mg daily for suspicion for COVID-19 infection. Test results still pending. Patient is on full PPE precautions. Patient's inflammatory markers are not significantly elevated. 4. We will start her on Symbicort 160/4.5 mcg 2 puff twice daily to control her asthma. She will get albuterol MDI as needed. 5. Also on Lovenox 30 mg twice a day for mildly elevated D-dimer. 6. If possible, get the patient out of bed into chair. Thank you for your consultation. We will continue to follow the patient. Will Abreu MD  Pulmonary Critical Care and Sleep Medicine  10/28/2020, 3:27 PM    This note was completed using dragon medical speech recognition software. Grammatical errors, random word insertions, pronoun errors and incomplete sentences are occasional consequences of this technology due to software limitations. If there are questions or concerns about the content of this note of information contained within the body of this dictation they should be addressed with the provider for clarification.

## 2020-10-29 NOTE — PROGRESS NOTES
[facility]    10/29/2020 7:02 AM        Name: Mei Leach . Admitted: 10/28/2020  Primary Care Provider: Humphrey Law DO (Tel: [phone])      Subjective:  . Admitted with shortness of breath and hypoxia. sats now 92% on 2 liters   Chart review indicates she is oxygen dependant at home  Goal weight 175- 180 , now at 185    Pt reports generalized fatigue and chronic shortness of breath.   She also endorses chronic pain from her RA    Reviewed interval ancillary notes    Current Medications  perflutren lipid microspheres (DEFINITY) injection 1.65 mg, ONCE PRN  aspirin EC tablet 81 mg, Daily  benztropine (COGENTIN) tablet 1 mg, Nightly  cariprazine hcl (VRAYLAR) capsule 1.5 mg - PATIENT SUPPLIED, Daily  DULoxetine (CYMBALTA) extended release capsule 60 mg, Daily  Febuxostat (Uloric) Tablet 80 mg - PATIENT SUPPLIED, Daily  ferrous sulfate (IRON 325) tablet 614 mg, BID WC  folic acid (FOLVITE) tablet 1 mg, Daily  Insulin Degludec (TRESIBA FLEXTOUCH) 160 MG - PATIENT SUPPLIED, Daily  levothyroxine (SYNTHROID) tablet 150 mcg, QAM AC  metoprolol tartrate (LOPRESSOR) tablet 25 mg, BID  montelukast (SINGULAIR) tablet 10 mg, Daily  nitroGLYCERIN (NITROSTAT) SL tablet 0.4 mg, Q5 Min PRN  nystatin (MYCOSTATIN) 351241 UNIT/ML suspension 500,000 Units, 4x Daily  pantoprazole (PROTONIX) tablet 40 mg, QAM AC  oxyCODONE (OXYCONTIN) extended release tablet 20 mg, BID  pregabalin (LYRICA) capsule 50 mg, BID  ranolazine (RANEXA) extended release tablet 500 mg, BID  atorvastatin (LIPITOR) tablet 20 mg, Nightly  torsemide (DEMADEX) tablet 50 mg, BID  spironolactone (ALDACTONE) tablet 25 mg, BID  azithromycin (ZITHROMAX) 500 mg in D5W 250ml Vial Mate, Q24H  acetaminophen (TYLENOL) tablet 650 mg, Q6H PRN    Or  acetaminophen (TYLENOL) suppository 650 mg, Q6H PRN  enoxaparin (LOVENOX) injection 30 mg, BID  glucose (GLUTOSE) 40 % oral gel 15 g, PRN  dextrose 50 % IV solution, PRN  glucagon (rDNA) injection 1 mg, PRN  dextrose 5 % solution, PRN  [START ON 10/30/2020] meropenem (MERREM) 1 g in sodium chloride 0.9 % 100 mL IVPB (mini-bag), Q8H  insulin lispro (1 Unit Dial) 7 Units, TID WC  insulin lispro (1 Unit Dial) 0-18 Units, TID WC  insulin lispro (1 Unit Dial) 0-9 Units, Nightly  sodium chloride flush 0.9 % injection 10 mL, 2 times per day  sodium chloride flush 0.9 % injection 10 mL, PRN  polyethylene glycol (GLYCOLAX) packet 17 g, Daily PRN  ondansetron (ZOFRAN-ODT) disintegrating tablet 4 mg, Q8H PRN    Or  ondansetron (ZOFRAN) injection 4 mg, Q6H PRN  albuterol sulfate  (90 Base) MCG/ACT inhaler 2 puff, 4x daily    And  ipratropium (ATROVENT HFA) 17 MCG/ACT inhaler 2 puff, 4x daily  oxyCODONE (ROXICODONE) immediate release tablet 5 mg, Q6H PRN  dexamethasone (PF) (DECADRON) injection 6 mg, Daily        Objective:  /60   Pulse 93   Temp 98.5 °F (36.9 °C) (Temporal)   Resp 18   Ht 5' (1.524 m)   Wt 184 lb (83.5 kg)   SpO2 92%   BMI 35.94 kg/m²     Intake/Output Summary (Last 24 hours) at 10/29/2020 0702  Last data filed at 10/29/2020 0533  Gross per 24 hour   Intake 240 ml   Output 1152 ml   Net -912 ml      Wt Readings from Last 3 Encounters:   10/29/20 184 lb (83.5 kg)   10/07/20 187 lb (84.8 kg)   10/01/20 185 lb (83.9 kg)       General appearance:  Appears chronically ill . She is obese with very flat affect   Eyes: Sclera clear. Pupils equal.  ENT: Moist oral mucosa. Trachea midline, no adenopathy. Cardiovascular: Regular rhythm, normal S1, S2. No murmur. No edema in lower extremities  Respiratory: Not using accessory muscles. Bi basilar crackles are noted. GI: Abdomen soft and obese with active bowel sounds. No tenderness or guarding ,  Musculoskeletal: No cyanosis in digits, neck supple  Neurology: CN 2-12 grossly intact. No speech or motor deficits  Psych: flat affect.  Alert and oriented in time, place and person  Skin: Warm, dry, left arm with significant plus 3 lymph edema ( s/p mastectomy )  Also plus 1 lower leg edema noted     Labs and Tests:  CBC:   Recent Labs     10/28/20  1656 10/28/20  2344 10/29/20  0429   WBC 11.0 11.6* 8.5   HGB 13.1 13.0 12.7    270 269     BMP:    Recent Labs     10/28/20  1656 10/28/20  2344 10/29/20  0429    136 134*   K 4.2 4.4 4.4   CL 90* 92* 90*   CO2 34* 32 31   BUN 28* 29* 32*   CREATININE 1.2* 1.2* 1.2*   GLUCOSE 187* 211* 304*     Hepatic:   Recent Labs     10/28/20  1656 10/28/20  2344 10/29/20  0429   AST 36 29 26   ALT 19 18 17   BILITOT <0.2 <0.2 <0.2   ALKPHOS 111 111 103     Results for Lisa Rhoades (MRN [id number]) as of 10/29/2020 15:00   Ref. Range 10/29/2020 08:07 10/29/2020 14:00   POC Glucose Latest Ref Range: 70 - 99 mg/dl 317 (H) 329 (H)     AIC 7.8 in March 2020    Chest xray:  Patchy bibasilar opacities may represent multifocal pneumonia or pulmonary    edema. 2. Mild pulmonary edema. Echo:     Summary   Normal left ventricle size, wall thickness and systolic function with an   estimated ejection fraction of 55-60%. No regional wall motion abnormalities are seen. E/e\"= 7.6 . Mild thickening of anterior & posterior leaflets of mitral valve. Mild tricuspid regurgitation. Indeterminate diastolic function. Problem List  Principal Problem:    Acute respiratory failure with hypoxemia (HCC)  Active Problems:    Chronic pain    Coronary artery disease involving native coronary artery of native heart without angina pectoris    Essential hypertension    Poorly controlled type 2 diabetes mellitus ([facility] [address]    Hypothyroidism    Hiatal hernia with GERD    LUIS (obstructive sleep apnea)    Chronic congestive heart failure ([facility] [address]    Acute hypoxemic respiratory failure (HCC)  Resolved Problems:    * No resolved hospital problems. *       Assessment & Plan:   1.  Acute respiratory failure with hypoxemia: Currently on 2 liters, covid is pending. T max 99.2  Pro calcitonin is normal. Will stop the zithromax and rocephin  2. Pulmonary edema: On oral torsemide and aldactone. Add strict I & O , daily weights etc , cardiology to follow. She is above her ideal weight   3. CAD:  Has normal stress test in July 2020. Continue home therapy of BB, ranexa, aldactone and dorsemide   4. Uncontrolled DM: BG values reviewed. Will decrease her daily basal insulin to 80 units and follow closely. Increase prandial to 15 with correction. Anticipate stopping steroids if covid negative   5. Hypothyroidism: continue home therapy. TSH pending   6. Depression: continue home therapy of cogentin ?  And Vraylar       Diet: DIET LOW SODIUM 2 GM; Carb Control: 4 carb choices (60 gms)/meal; 1800 ml  Code:Full Code  DVT PPX      HERMES Yan - CNP   10/29/2020 7:02 AM

## 2020-10-29 NOTE — PROGRESS NOTES
4 Eyes Skin Assessment     NAME:  Wendy Freedman  YOB: 1970  MEDICAL RECORD NUMBER:  [id number]    The patient is being assess for  Admission    I agree that 2 RN's have performed a thorough Head to Toe Skin Assessment on the patient. ALL assessment sites listed below have been assessed. Areas assessed by both nurses:    Head, Face, Ears, Shoulders, Back, Chest, Arms, Elbows, Hands, Sacrum. Buttock, Coccyx, Ischium and Legs. Feet and Heels        Does the Patient have a Wound?  No noted wound(s)       Clifford Prevention initiated:  Yes   Wound Care Orders initiated:  NA    Pressure Injury (Stage 3,4, Unstageable, DTI, NWPT, and Complex wounds) if present place consult order under [de-identified] NA    New and Established Ostomies if present place consult order under : NA      Nurse 1 eSignature: Electronically signed by Cathy Tate RN on 10/29/20 at 6:58 AM EDT    **SHARE this note so that the co-signing nurse is able to place an eSignature**    Nurse 2 eSignature: Electronically signed by Lucy Alicia RN on 10/29/20 at 6:59 AM EDT

## 2020-10-29 NOTE — ED NOTES
Pt report called to KARY Villatoro, states no questions or concerns at this time.       [facility], RN  10/28/20 2055

## 2020-10-29 NOTE — PLAN OF CARE
Denies sobs  Covid sars  2 neg  02 2L  Diuresed w lasix  Carmona intact  Steroids given as ordered  Abx in progress  Gluc as noted  Coverage given

## 2020-10-29 NOTE — PROGRESS NOTES
Admission completed with POA. Hospitalist notified for orders. Pt in [facility] isolation-awaiting results. Cdiff isolation as well. Will continue to monitor. Call light within reach.

## 2020-10-29 NOTE — PLAN OF CARE
Problem: Falls - Risk of:  Goal: Will remain free from falls  Description: Will remain free from falls  Outcome: Ongoing  Note: Pt has remained free from any falls so far this shift. Bed alarm activated. Non-skid socks on. Bed in lowest and locked position. Carmona cath in place. Belongings within reach. Will continue to monitor. Call light within reach.

## 2020-10-29 NOTE — DISCHARGE INSTR - COC
Continuity of Care Form    Patient Name: Pako Fairbanks   :  1970  MRN:  [id number]    Admit date:  10/28/2020  Discharge date:  10/31/20    Code Status Order: Full Code   Advance Directives:   [facility] Documentation       Date/Time Healthcare Directive Type of Healthcare Directive Copy in [facility] [address] Agent's Name Healthcare Agent's Phone Number    10/28/20 8142  Yes, patient has an advance directive for healthcare treatment  Durable power of  for health care;Living will  No, copy requested from family  Spouse  --  --            Admitting Physician:  Brenna Hernandez MD  PCP: Dacia Winter DO    Discharging Nurse: Caleb. [address] [facility] Final Unit/Room#: [id number]/5635-55  Discharging Unit Phone Number: [phone]    Emergency Contact:   Extended Emergency Contact Information  Primary Emergency Contact: Lindsaycorin Sharma  Address: [address] [place] [address] Phone: [phone]  Relation: Spouse  Secondary Emergency Contact: [address] Phone: [phone]  Relation: Child  Preferred language: English   needed?  No    Past Surgical History:  Past Surgical History:   Procedure Laterality Date    BREAST SURGERY      left mastectomy    CARPAL TUNNEL RELEASE      Bilateral    FINGER CONTRACTURE SURGERY      HYSTERECTOMY  2005    USO    TONSILLECTOMY      UPPER GASTROINTESTINAL ENDOSCOPY  2019    stretched esophagous        Immunization History:   Immunization History   Administered Date(s) Administered    Influenza Vaccine, unspecified formulation 10/25/2010, 2011, 2012, 2014    Influenza Virus Vaccine 2016    Influenza, Quadv, IM, (6 mo and older Fluzone, Flulaval, Fluarix and 3 yrs and older Afluria) 10/07/2015    Influenza, Quadv, IM, PF (6 mo and older Fluzone, Flulaval, Fluarix, and 3 yrs and older Afluria) 10/21/2019    Pneumococcal Polysaccharide (Hiakxclyl32) 11/14/2016    Td, unspecified formulation 06/27/2010       Active Problems:  Patient Active Problem List   Diagnosis Code    Fibromyalgia M79.7    Iron deficiency anemia D50.9    Malignant neoplasm of overlapping sites of left female breast ([facility] C50.812    Chronic pain G89.29    Lymphedema of upper extremity following lymphadenectomy E89.89, I89.0    Encounter for monitoring aromatase inhibitor therapy Z51.81, Z79.811    Encounter for follow-up surveillance of breast cancer Z08, Z85.3    Hyperlipidemia LDL goal <70 E78.5    Essential hypertension I10    Poorly controlled type 2 diabetes mellitus ([facility] E11.65    Asthma J45.909    Hypothyroidism E03.9    Anxiety and depression F41.9, F32.9    Hx of breast cancer Z85.3    Hypoxia R09.02    Hypervolemia E87.70    SOB (shortness of breath) R06.02    Hiatal hernia with GERD K21.9, K44.9    LUIS (obstructive sleep apnea) G47.33    Coronary artery disease involving native coronary artery of native heart without angina pectoris I25.10    Severe persistent asthma without complication U77.02    Diabetic polyneuropathy associated with type 2 diabetes mellitus (HCC) E11.42    Chronic congestive heart failure (HCC) I50.9    Chronic heart failure with preserved ejection fraction (HCC) I50.32    Coronary artery disease due to lipid rich plaque I25.10, I25.83    Renal insufficiency N28.9    RA (rheumatoid arthritis) (HCC) M06.9    Pyelonephritis N12    History of nephrolithiasis Z80.1    Immigrant with language difficulty Z60.3    Class 1 obesity due to excess calories with body mass index (BMI) of 31.0 to 31.9 in adult E66.09, Z68.31    H/O TIA (transient ischemic attack) and stroke Z86.73    Need for isolation Z41.8    Encounter for medication counseling Z71.89    Hesitancy of micturition R39.11    Weight loss counseling, encounter for Z71.3    Complex care coordination Z71.89    Oropharyngeal dysphagia R13.12  Constipation due to pain medication K59.03    Right hand pain M79.641    Fungal dermatitis B36.9    Mouth pain K13.79    Environmental and seasonal allergies J30.89    Hypersomnolence G47.10    Enlargement of submandibular gland K11.1    Jaw pain R68.84    Pain in both lower extremities M79.604, M79.605    Vertigo R42    Fatigue R53.83    Edema of right lower extremity R60.0    Hot flashes R23.2    Chest pain R07.9    Excessive sweating R61    Tongue pain K14.6    Pneumonia J18.9    Acute respiratory failure with hypoxemia (HCC) J96.01    Tension type headache G44.209    Hyperglycemia R73.9    Inattention R41.840    Severe episode of recurrent major depressive disorder (HCC) F33.2    Alopecia L65.9    Aphthous ulcer of tongue K12.0    Seborrhea capitis in adult L21.0    Thrush B37.0    Scalp cyst L72.9    Acute hypoxemic respiratory failure (HCC) J96.01       Isolation/Infection:   Isolation            Droplet Plus          Patient Infection Status       Infection Onset Added Last Indicated Last Indicated By Review Planned Expiration Resolved Resolved By    C-diff Rule Out 10/28/20 10/28/20 10/28/20 Clostridium difficile toxin/antigen (Ordered)        COVID-19 Rule Out 10/28/20 10/28/20 10/28/20 COVID-19 (Ordered) 11/04/20 11/11/20      MDRO (multi-drug resistant organism) 03/13/19 03/15/19 03/13/19 Urine Culture        ESBL (Extended Spectrum Beta Lactamase)  03/16/18 03/13/19 Urine Culture        3/14/18; urine    Resolved    COVID-19 Rule Out 03/25/20 03/25/20 03/25/20 COVID-19 (Ordered)   03/27/20 Rule-Out Test Resulted            Nurse Assessment:  Last Vital Signs: /60   Pulse 93   Temp 98 °F (36.7 °C) (Temporal)   Resp 16   Ht 5' (1.524 m)   Wt 184 lb (83.5 kg)   SpO2 93%   BMI 35.94 kg/m²     Last documented pain score (0-10 scale): Pain Level: 6  Last Weight:   Wt Readings from Last 1 Encounters:   10/29/20 184 lb (83.5 kg)     Mental Status:  oriented and alert    IV Access:  - None    Nursing Mobility/ADLs:  Walking   Assisted  Transfer  Assisted  Bathing  Assisted  Dressing  Assisted  Toileting  Assisted  Feeding  [address]  Independent  Med Delivery   whole    Wound Care Documentation and Therapy:        Elimination:  Continence:   · Bowel: Yes  · Bladder: Yes  Urinary Catheter: None   Colostomy/Ileostomy/Ileal Conduit: ***       Date of Last BM: ***    Intake/Output Summary (Last 24 hours) at 10/29/2020 1514  Last data filed at 10/29/2020 0533  Gross per 24 hour   Intake 240 ml   Output 1152 ml   Net -912 ml     I/O last 3 completed shifts: In: 240 [P.O.:240]  Out: 1152 [KAMM]    Safety Concerns: At Risk for Falls    Impairments/Disabilities:      None    Nutrition Therapy:  Current Nutrition Therapy:   - Oral Diet:  General    Routes of Feeding: Oral  Liquids: Thin Liquids  Daily Fluid Restriction: no  Last Modified Barium Swallow with Video (Video Swallowing Test): not done    Treatments at the Time of Hospital Discharge:   Respiratory Treatments: ***  Oxygen Therapy:  is on oxygen at 2 L/min per nasal cannula.   Ventilator:    - No ventilator support    Rehab Therapies: Skilled Nursing, Physical Therapy, and Occupational Therapy  Weight Bearing Status/Restrictions: No weight bearing restirctions  Other Medical Equipment (for information only, NOT a DME order):  walker  Other Treatments: ***    Patient's personal belongings (please select all that are sent with patient):  None    RN SIGNATURE:  Electronically signed by Chris Etienne RN on 10/31/20 at 2:49 PM EDT    CASE MANAGEMENT/SOCIAL WORK SECTION    Inpatient Status Date: 10/28/2020    Readmission Risk Assessment Score:  Readmission Risk              Risk of Unplanned Readmission:        38           Discharging to Facility/ Agency   Name: [facility] will call for Appointment  Phone: [phone]  Fax: [phone]    Dialysis Facility (if applicable)

## 2020-10-29 NOTE — CONSULTS
Finger contracture surgery; and Upper gastrointestinal endoscopy (2019). Social History:   reports that she has never smoked. She has never used smokeless tobacco. She reports that she does not drink alcohol or use drugs. Family History:  Father with Mi at age 72    Home Medications:  Were reviewed and are listed in nursing record. and/or listed below  Prior to Admission medications    Medication Sig Start Date End Date Taking?  Authorizing Provider   simvastatin (ZOCOR) 20 MG tablet TAKE 1 TABLET BY MOUTH NIGHTLY  10/5/20  Yes Rossana Gann MD   metoprolol tartrate (LOPRESSOR) 25 MG tablet TAKE 1 TABLET BY MOUTH TWO TIMES A DAY  10/5/20  Yes Rossana Gann MD   Ertugliflozin L-PyroglutamicAc (STEGLATRO) 5 MG TABS TAKE 1 TABLET BY MOUTH ONE TIME A DAY 10/5/20  Yes Christal Lott MD   spironolactone (ALDACTONE) 50 MG tablet TAKE 2 TABLETS BY MOUTH IN THE MORNING AND ONE TABLET IN THE EVENING 10/5/20  Yes Rossana Gann MD   omeprazole (PRILOSEC) 20 MG delayed release capsule TAKE 1 CAPSULE BY MOUTH TWO TIMES A DAY  10/5/20  Yes Cesar Conte DO   loratadine (CLARITIN) 10 MG tablet TAKE 1 TABLET BY MOUTH ONE TIME A DAY  10/5/20  Yes Cesar Conte DO   tiZANidine (ZANAFLEX) 2 MG tablet TAKE 1 TABLET BY MOUTH EVERY EIGHT HOURS AS NEEDED FOR PAIN AND SPASM 9/23/20  Yes Cesar Conte DO   nystatin (MYCOSTATIN) 405449 UNIT/ML suspension Take 5 mLs by mouth 4 times daily 9/23/20  Yes Cesar Conte DO   Magic Mouthwash (MIRACLE MOUTHWASH) Swish and spit 5 mLs 4 times daily as needed for Irritation or Pain 9/23/20  Yes Cesar Conte DO   montelukast (SINGULAIR) 10 MG tablet TAKE 1 TABLET BY MOUTH ONE TIME A DAY  8/17/20  Yes Albania Knight MD   ferrous sulfate (IRON 325) 325 (65 Fe) MG tablet TAKE 1 TABLET BY MOUTH TWO TIMES A DAY WITH MEALS 8/17/20  Yes Cesar Conte DO   lidocaine viscous hcl (XYLOCAINE) 2 % SOLN solution Take 5 mLs by mouth every 3 hours as needed for Irritation or Pain 8/10/20  Yes Ranulfo Bateman, DO   ketoconazole (NIZORAL) 2 % shampoo Apply topically daily as needed. 8/10/20  Yes Ranulfo Bateman DO   Insulin Degludec (TRESIBA FLEXTOUCH) 200 UNIT/ML SOPN inject 160 units subcutaneously once daily 8/6/20  Yes Efra Bright MD   NOVOLOG FLEXPEN 100 UNIT/ML injection pen INJECT 30 TO 50 UNITS INTO THE SKIN THREE TIMES DAILY BEFORE MEALS 7/20/20  Yes Efra Bright MD   metOLazone (ZAROXOLYN) 2.5 MG tablet TAKE 1 TABLET BY MOUTH TWO TIMES A WEEK AS NEEDED for weight over 180 pounds. do not take two days in a row 6/22/20  Yes Rio Carrion MD   nitroGLYCERIN (NITROSTAT) 0.4 MG SL tablet Place 1 tablet under the tongue every 5 minutes as needed for Chest pain up to max of 3 total doses. If no relief after 1 dose, call 911. 6/22/20  Yes Rio Carrion MD   vitamin D3 (CHOLECALCIFEROL) 25 MCG (1000 UT) TABS tablet TAKE 3 TABLETS BY MOUTH ONE TIME A DAY 6/3/20  Yes Efra Bright MD   levothyroxine (SYNTHROID) 150 MCG tablet Take 1 tablet by mouth every morning (before breakfast) 6/3/20  Yes Efra Bright MD   diazePAM (VALIUM) 5 MG tablet Take 5 mg by mouth 2 times daily as needed for Anxiety. Yes Historical Provider, MD   cariprazine hcl (VRAYLAR) 1.5 MG capsule Take 1.5 mg by mouth daily   Yes Historical Provider, MD   Febuxostat 80 MG TABS Take 80 mg by mouth daily   Yes Historical Provider, MD   pregabalin (LYRICA) 50 MG capsule Take 50 mg by mouth 2 times daily.    Yes Historical Provider, MD   lubiprostone (AMITIZA) 24 MCG capsule Take 24 mcg by mouth daily (with breakfast)   Yes Historical Provider, MD   torsemide (DEMADEX) 100 MG tablet TAKE 1 TABLET BY MOUTH IN THE MORNING AND 1/2 TABLET IN THE EVENING  12/24/19  Yes Rio Carrion MD   ranolazine (RANEXA) 500 MG extended release tablet TAKE 1 TABLET BY MOUTH TWO TIMES A DAY  12/24/19  Yes Rio Carrion MD   leflunomide (ARAVA) 10 MG tablet Take 10 mg by mouth daily  9/23/19  Yes Historical Provider, MD OXYGEN Inhale 3 L into the lungs nightly Sometimes with activity   Yes Historical Provider, MD   oxyCODONE (OXYCONTIN) 20 MG extended release tablet Take 20 mg by mouth every 12 hours. Yes Historical Provider, MD   aspirin 81 MG EC tablet Take 81 mg by mouth daily   Yes Historical Provider, MD   sulfaSALAzine (AZULFIDINE) 500 MG tablet Take 500 mg by mouth 3 times daily    Yes Historical Provider, MD   benztropine (COGENTIN) 1 MG tablet Take 1 mg by mouth nightly    Yes Historical Provider, MD   folic acid (FOLVITE) 1 MG tablet Take 1 mg by mouth daily   Yes Historical Provider, MD   oxyCODONE-acetaminophen (PERCOCET)  MG per tablet Take 1 tablet by mouth every 4 hours as needed for Pain . Earliest Fill Date: 6/8/17 6/8/17  Yes HERMES Coyle CNP   duloxetine (CYMBALTA) 60 MG capsule Take 60 mg by mouth 2 times daily.      Yes Historical Provider, MD DIALLO UF III MINI PEN NEEDLES 31G X 5 MM MISC use five times daily with insulin 9/14/20   Goldy White MD   blood glucose test strips (FREESTYLE LITE) strip test blood sugar four times daily 6/22/20   Goldy White MD        Current Medications:  Current Facility-Administered Medications   Medication Dose Route Frequency Provider Last Rate Last Dose    perflutren lipid microspheres (DEFINITY) injection 1.65 mg  1.5 mL Intravenous ONCE PRN Deb Armenta MD        glucose (GLUTOSE) 40 % oral gel 15 g  15 g Oral PRN Francine SABILLON DechristophRONNA juniorN - CNP        dextrose 50 % IV solution  12.5 g Intravenous PRN Francine DuboishristophHERMES junior - CNP        glucagon (rDNA) injection 1 mg  1 mg Intramuscular PRN Madvaleriaa L Dechristopher, APRN - CNP        dextrose 5 % solution  100 mL/hr Intravenous PRN Francine CopeistophHERMES junior - CNP        azithromycin (ZITHROMAX) 500 mg in D5W 250ml Vial Mate  500 mg Intravenous Q24H Kwan Velez MD        cefTRIAXone (ROCEPHIN) 1 g in sterile water 10 mL IV syringe  1 g Intravenous Q24H Kwan Velez MD       [facility] budesonide-formoterol (SYMBICORT) 160-4.5 MCG/ACT inhaler 2 puff  2 puff Inhalation BID Sara Lima MD   2 puff at 10/29/20 1230    insulin glargine (LANTUS;BASAGLAR) injection pen 75 Units  75 Units Subcutaneous BID Sara Lima MD        aspirin EC tablet 81 mg  81 mg Oral Daily Brenna Hernandez MD   81 mg at 10/29/20 0910    benztropine (COGENTIN) tablet 1 mg  1 mg Oral Nightly Brenna Hernandez MD   1 mg at 10/29/20 0015    cariprazine hcl (VRAYLAR) capsule 1.5 mg - PATIENT SUPPLIED  1.5 mg Oral Daily Brenna Hernandez MD        DULoxetine (CYMBALTA) extended release capsule 60 mg  60 mg Oral Daily Brenna Hernandez MD   60 mg at 10/29/20 0910    Febuxostat (Uloric) Tablet 80 mg - PATIENT SUPPLIED  80 mg Oral Daily Brenna Hernandez MD        ferrous sulfate (IRON 325) tablet 325 mg  325 mg Oral BID WC Brenna Hernandez MD   325 mg at 95/23/14 2526    folic acid (FOLVITE) tablet 1 mg  1 mg Oral Daily Brenna Hernandez MD   1 mg at 10/29/20 6657    levothyroxine (SYNTHROID) tablet 150 mcg  150 mcg Oral QAM AC Brenna Hernandez MD   150 mcg at 10/29/20 0532    metoprolol tartrate (LOPRESSOR) tablet 25 mg  25 mg Oral BID Brenna Hernandez MD   25 mg at 10/29/20 0908    montelukast (SINGULAIR) tablet 10 mg  10 mg Oral Daily Brenna Hernandez MD   10 mg at 10/29/20 0907    nitroGLYCERIN (NITROSTAT) SL tablet 0.4 mg  0.4 mg Sublingual Q5 Min PRN Brenna Hernandez MD        nystatin (MYCOSTATIN) 041831 UNIT/ML suspension 500,000 Units  500,000 Units Oral 4x Daily Brenna Hernandez MD   500,000 Units at 10/29/20 0907    pantoprazole (PROTONIX) tablet 40 mg  40 mg Oral QAM AC Brenna Hernandez MD   40 mg at 10/29/20 0532    oxyCODONE (OXYCONTIN) extended release tablet 20 mg  20 mg Oral BID Brenna Hernandez MD   20 mg at 10/29/20 0930    pregabalin (LYRICA) capsule 50 mg  50 mg Oral BID Brenna Hernandez MD   50 mg at 10/29/20 0930    ranolazine (RANEXA) extended release tablet 500 mg  500 mg Oral BID Zehra REESE ipratropium (ATROVENT HFA) 17 MCG/ACT inhaler 2 puff  2 puff Inhalation 4x daily Js Kyle MD   2 puff at 10/29/20 1228    oxyCODONE (ROXICODONE) immediate release tablet 5 mg  5 mg Oral Q6H PRN Js Kyle MD        dexamethasone (PF) (DECADRON) injection 6 mg  6 mg Intravenous Daily Js Kyle MD   6 mg at 10/29/20 4637        Allergies: Iv dye [iodides]; Diazepam; and Trazodone and nefazodone     Review of Systems:     · Constitutional: there has been no unanticipated weight loss. +fatigue  · Eyes: No visual changes or diplopia. No scleral icterus. · ENT: No Headaches, hearing loss or vertigo. No mouth sores or sore throat. · Cardiovascular: +chest pain, +shortness of breath  · Respiratory: +cough, +dyspnea   · Gastrointestinal: +nausea, +diarrhea  · Genitourinary: No dysuria, trouble voiding, or hematuria. · Musculoskeletal:  +joint pains, +weakness   · Integumentary: No rash or pruritis. · Neurological: No headache, diplopia, change in muscle strength, numbness or tingling. No change in gait, balance, coordination, mood, affect, memory, mentation, behavior. · Psychiatric: No anxiety, no depression. · Endocrine: No excessive thirst, fluid intake, or urination. No tremor. · Hematologic/Lymphatic: No abnormal bruising or bleeding, blood clots or swollen lymph nodes. · Allergic/Immunologic: No nasal congestion or hives. ·     Physical Examination:    Vitals:    10/29/20 1232   BP: 102/60   Pulse: 93   Resp: 18   Temp: 98   SpO2: 93%    Weight: 184 lb (83.5 kg)       Examination deferred given Covid-19 pandemic ruleout and need to limit spread preserve PPE.  Please refer to primary team note for further physical examination     Labs  CBC:   Lab Results   Component Value Date    WBC 8.5 10/29/2020    RBC 4.36 10/29/2020    RBC 3.57 05/16/2017    HGB 12.7 10/29/2020    HCT 39.1 10/29/2020    MCV 89.7 10/29/2020    RDW 14.9 10/29/2020     10/29/2020     CMP:    Lab Results Component Value Date     10/29/2020    K 4.4 10/29/2020    CL 90 10/29/2020    CO2 31 10/29/2020    BUN 32 10/29/2020    CREATININE 1.2 10/29/2020    GFRAA 57 10/29/2020    GFRAA >60 06/19/2011    AGRATIO 1.0 10/29/2020    LABGLOM 47 10/29/2020    GLUCOSE 304 10/29/2020    GLUCOSE 313 05/16/2017    PROT 8.0 10/29/2020    PROT 6.6 05/16/2017    CALCIUM 9.7 10/29/2020    BILITOT <0.2 10/29/2020    ALKPHOS 103 10/29/2020    AST 26 10/29/2020    ALT 17 10/29/2020     PT/INR:  No results found for: PTINR  Lab Results   Component Value Date    TROPONINI <0.01 10/28/2020     EKG: sinus rhythm     Stress Test 7-1-2020   There is normal isotope uptake at stress and rest. There is no evidence of     myocardial ischemia or scar.     Normal LV function.     Left ventricular ejection fraction of 59 %.     Overall findings represent a low risk scan.         Stress Protocols          Resting ECG     Normal sinus rhythm.          Left Heart Cath 2/27/18:  Dominance : Right     LM: bifurcating, MLI  LAD: mild plaquing with small vessel disease distally   LCx: no significant disease  RCA: MLI     LVEDP: 25 mmHg   No Ao gradient     Right Heart Cath   RA: 13  RV: 47/6/16  PA:   46/19    and mean of 32  PCWP: 21 mmHg      Sats:  Ao: 92%  RA: 61%  PA: 62% (x 2)   CO/CI : 6.1 L     CXR 1/15/18:  No acute cardiopulmonary disease     Echo 11/8/2017:  Normal left ventricle size and systolic function with an estimated ejection   fraction of 60%. No regional wall motion abnormalities are seen.  [place] is borderline concentric left ventricular hypertrophy.   E/e\"= 13     Stress test 11/8/2017:   There is normal isotope uptake at stress and rest. There is no evidence of  myocardial ischemia or scar.  Normal LV function.  Overall findings represent a low risk scan.       VQ scan negative 1/11/2018     CXR 1/16/18  No acute cardiopulmonary disease     Echo 11/8/2017:  Normal left ventricle size and systolic function with an estimated diabetes mellitus (HCC)    Chronic congestive heart failure (HCC)    [facility] heart failure with preserved ejection fraction ([facility]    Coronary artery disease due to lipid rich plaque    Renal insufficiency    RA (rheumatoid arthritis) ([facility]    Pyelonephritis    History of nephrolithiasis    Immigrant with language difficulty    Class 1 obesity due to excess calories with body mass index (BMI) of 31.0 to 31.9 in adult    H/O TIA (transient ischemic attack) and stroke    Need for isolation    Encounter for medication counseling    Hesitancy of micturition    Weight loss counseling, encounter for    Complex care coordination    Oropharyngeal dysphagia    Constipation due to pain medication    Right hand pain    Fungal dermatitis    Mouth pain    Environmental and seasonal allergies    Hypersomnolence    Enlargement of submandibular gland    Jaw pain    Pain in both lower extremities    Vertigo    Fatigue    Edema of right lower extremity    Hot flashes    Chest pain    Excessive sweating    Tongue pain    Pneumonia    Acute respiratory failure with hypoxemia (HCC)    Tension type headache    Hyperglycemia    Inattention    Severe episode of recurrent major depressive disorder (HCC)    Alopecia    Aphthous ulcer of tongue    Seborrhea capitis in adult    Thrush    Scalp cyst    Acute hypoxemic respiratory failure ([facility]         Plan:    I had the opportunity to review the clinical symptoms and presentation of [address] [place] [address]. Assessment/Plan:  1. Heart failure with preserved ejection fraction  - takes torsemide 100 mg in the morning and 50 mg in the evening, when less than 174 lbs, takes torsemide 50 mg bid. Metolazone 2.5 mg 3 times in 2 week interval  - weight 185, dry weight closer to 180  Plan:  - diuresis with furosemide 80 mg once. Hold spironolactone given softer blood pressures and concern for infection   - covid 19 pending     2.  Chronic chest pain  - Coronary artery disease with diffuse LAD disease  - troponin negative  Plan:  - continue aspirin and statin   - continue metoprolol t 25 mg bid  - continue ranolazine 500 mg bid    3. Shortness of breath  - heart failure vs infection  Plan:  - covid rule out  - antibiotics started     4. Polypharmacy  - patient is taking entirely too much medication    5. Chronic pain     I will address the patient's cardiac risk factors and adjusted pharmacologic treatment as needed. In addition, I have reinforced the need for patient directed risk factor modification. All questions and concerns were addressed to the patient/family. Alternatives to my treatment were discussed. The note was completed using EMR. Every effort was made to ensure accuracy; however, inadvertent computerized transcription errors may be present.   Laurence Pop MD 10/29/2020 1:47 PM

## 2020-10-30 LAB
ANION GAP SERPL CALCULATED.3IONS-SCNC: 10 MMOL/L (ref 3–16)
BASOPHILS ABSOLUTE: 0.1 K/UL (ref 0–0.2)
BASOPHILS RELATIVE PERCENT: 0.9 %
BUN BLDV-MCNC: 53 MG/DL (ref 7–20)
CALCIUM SERPL-MCNC: 9 MG/DL (ref 8.3–10.6)
CHLORIDE BLD-SCNC: 92 MMOL/L (ref 99–110)
CO2: 34 MMOL/L (ref 21–32)
CREAT SERPL-MCNC: 1.3 MG/DL (ref 0.6–1.1)
D DIMER: 236 NG/ML DDU (ref 0–229)
EOSINOPHILS ABSOLUTE: 0 K/UL (ref 0–0.6)
EOSINOPHILS RELATIVE PERCENT: 0 %
FIBRINOGEN: 560 MG/DL (ref 200–397)
GFR AFRICAN AMERICAN: 52
GFR NON-AFRICAN AMERICAN: 43
GLUCOSE BLD-MCNC: 139 MG/DL (ref 70–99)
GLUCOSE BLD-MCNC: 153 MG/DL (ref 70–99)
GLUCOSE BLD-MCNC: 214 MG/DL (ref 70–99)
GLUCOSE BLD-MCNC: 259 MG/DL (ref 70–99)
GLUCOSE BLD-MCNC: 94 MG/DL (ref 70–99)
HCT VFR BLD CALC: 32.2 % (ref 36–48)
HEMOGLOBIN: 10.5 G/DL (ref 12–16)
INR BLD: 0.92 (ref 0.86–1.14)
LV EF: 55 %
LVEF MODALITY: NORMAL
LYMPHOCYTES ABSOLUTE: 2.1 K/UL (ref 1–5.1)
LYMPHOCYTES RELATIVE PERCENT: 18.9 %
MCH RBC QN AUTO: 28.2 PG (ref 26–34)
MCHC RBC AUTO-ENTMCNC: 32.6 G/DL (ref 31–36)
MCV RBC AUTO: 86.3 FL (ref 80–100)
MONOCYTES ABSOLUTE: 0.7 K/UL (ref 0–1.3)
MONOCYTES RELATIVE PERCENT: 6.5 %
NEUTROPHILS ABSOLUTE: 8.2 K/UL (ref 1.7–7.7)
NEUTROPHILS RELATIVE PERCENT: 73.7 %
PDW BLD-RTO: 14.5 % (ref 12.4–15.4)
PERFORMED ON: ABNORMAL
PERFORMED ON: NORMAL
PLATELET # BLD: 269 K/UL (ref 135–450)
PMV BLD AUTO: 8.5 FL (ref 5–10.5)
POTASSIUM SERPL-SCNC: 4.1 MMOL/L (ref 3.5–5.1)
PRO-BNP: 281 PG/ML (ref 0–124)
PROTHROMBIN TIME: 10.7 SEC (ref 10–13.2)
RBC # BLD: 3.74 M/UL (ref 4–5.2)
REPORT: NORMAL
SODIUM BLD-SCNC: 136 MMOL/L (ref 136–145)
WBC # BLD: 11.1 K/UL (ref 4–11)

## 2020-10-30 PROCEDURE — 94640 AIRWAY INHALATION TREATMENT: CPT

## 2020-10-30 PROCEDURE — 85379 FIBRIN DEGRADATION QUANT: CPT

## 2020-10-30 PROCEDURE — 99233 SBSQ HOSP IP/OBS HIGH 50: CPT | Performed by: NURSE PRACTITIONER

## 2020-10-30 PROCEDURE — 1200000000 HC SEMI PRIVATE

## 2020-10-30 PROCEDURE — 94761 N-INVAS EAR/PLS OXIMETRY MLT: CPT

## 2020-10-30 PROCEDURE — 6370000000 HC RX 637 (ALT 250 FOR IP): Performed by: INTERNAL MEDICINE

## 2020-10-30 PROCEDURE — 2580000003 HC RX 258: Performed by: HOSPITALIST

## 2020-10-30 PROCEDURE — 85610 PROTHROMBIN TIME: CPT

## 2020-10-30 PROCEDURE — 2700000000 HC OXYGEN THERAPY PER DAY

## 2020-10-30 PROCEDURE — 6360000002 HC RX W HCPCS: Performed by: HOSPITALIST

## 2020-10-30 PROCEDURE — 85025 COMPLETE CBC W/AUTO DIFF WBC: CPT

## 2020-10-30 PROCEDURE — 80048 BASIC METABOLIC PNL TOTAL CA: CPT

## 2020-10-30 PROCEDURE — 6360000002 HC RX W HCPCS: Performed by: INTERNAL MEDICINE

## 2020-10-30 PROCEDURE — 6370000000 HC RX 637 (ALT 250 FOR IP): Performed by: HOSPITALIST

## 2020-10-30 PROCEDURE — 85384 FIBRINOGEN ACTIVITY: CPT

## 2020-10-30 PROCEDURE — 93306 TTE W/DOPPLER COMPLETE: CPT

## 2020-10-30 PROCEDURE — 83880 ASSAY OF NATRIURETIC PEPTIDE: CPT

## 2020-10-30 RX ORDER — FUROSEMIDE 10 MG/ML
40 INJECTION INTRAMUSCULAR; INTRAVENOUS ONCE
Status: COMPLETED | OUTPATIENT
Start: 2020-10-30 | End: 2020-10-30

## 2020-10-30 RX ORDER — IPRATROPIUM BROMIDE AND ALBUTEROL SULFATE 2.5; .5 MG/3ML; MG/3ML
1 SOLUTION RESPIRATORY (INHALATION)
Status: DISCONTINUED | OUTPATIENT
Start: 2020-10-30 | End: 2020-10-31 | Stop reason: HOSPADM

## 2020-10-30 RX ORDER — FUROSEMIDE 10 MG/ML
80 INJECTION INTRAMUSCULAR; INTRAVENOUS ONCE
Status: DISCONTINUED | OUTPATIENT
Start: 2020-10-30 | End: 2020-10-30

## 2020-10-30 RX ORDER — INSULIN LISPRO 100 [IU]/ML
20 INJECTION, SOLUTION INTRAVENOUS; SUBCUTANEOUS
Status: DISCONTINUED | OUTPATIENT
Start: 2020-10-30 | End: 2020-10-31

## 2020-10-30 RX ADMIN — Medication 2 PUFF: at 15:49

## 2020-10-30 RX ADMIN — LEVOTHYROXINE SODIUM 150 MCG: 0.15 TABLET ORAL at 05:39

## 2020-10-30 RX ADMIN — METOPROLOL TARTRATE 25 MG: 25 TABLET, FILM COATED ORAL at 20:27

## 2020-10-30 RX ADMIN — FUROSEMIDE 40 MG: 10 INJECTION, SOLUTION INTRAMUSCULAR; INTRAVENOUS at 09:52

## 2020-10-30 RX ADMIN — OXYCODONE 5 MG: 5 TABLET ORAL at 15:57

## 2020-10-30 RX ADMIN — Medication 2 PUFF: at 18:19

## 2020-10-30 RX ADMIN — INSULIN LISPRO 15 UNITS: 100 INJECTION, SOLUTION INTRAVENOUS; SUBCUTANEOUS at 10:14

## 2020-10-30 RX ADMIN — INSULIN LISPRO 15 UNITS: 100 INJECTION, SOLUTION INTRAVENOUS; SUBCUTANEOUS at 12:52

## 2020-10-30 RX ADMIN — INSULIN GLARGINE 75 UNITS: 100 INJECTION, SOLUTION SUBCUTANEOUS at 10:13

## 2020-10-30 RX ADMIN — PREGABALIN 50 MG: 50 CAPSULE ORAL at 09:50

## 2020-10-30 RX ADMIN — MONTELUKAST SODIUM 10 MG: 10 TABLET, COATED ORAL at 09:50

## 2020-10-30 RX ADMIN — NYSTATIN 500000 UNITS: 100000 SUSPENSION ORAL at 18:27

## 2020-10-30 RX ADMIN — NYSTATIN 500000 UNITS: 100000 SUSPENSION ORAL at 09:51

## 2020-10-30 RX ADMIN — FERROUS SULFATE TAB 325 MG (65 MG ELEMENTAL FE) 325 MG: 325 (65 FE) TAB at 18:27

## 2020-10-30 RX ADMIN — FOLIC ACID 1 MG: 1 TABLET ORAL at 09:50

## 2020-10-30 RX ADMIN — PANTOPRAZOLE SODIUM 40 MG: 40 TABLET, DELAYED RELEASE ORAL at 05:39

## 2020-10-30 RX ADMIN — INSULIN LISPRO 9 UNITS: 100 INJECTION, SOLUTION INTRAVENOUS; SUBCUTANEOUS at 12:52

## 2020-10-30 RX ADMIN — RANOLAZINE 500 MG: 500 TABLET, FILM COATED, EXTENDED RELEASE ORAL at 20:27

## 2020-10-30 RX ADMIN — ONDANSETRON 4 MG: 2 INJECTION INTRAMUSCULAR; INTRAVENOUS at 20:28

## 2020-10-30 RX ADMIN — DULOXETINE HYDROCHLORIDE 60 MG: 60 CAPSULE, DELAYED RELEASE ORAL at 09:50

## 2020-10-30 RX ADMIN — METOPROLOL TARTRATE 25 MG: 25 TABLET, FILM COATED ORAL at 09:50

## 2020-10-30 RX ADMIN — RANOLAZINE 500 MG: 500 TABLET, FILM COATED, EXTENDED RELEASE ORAL at 09:51

## 2020-10-30 RX ADMIN — OXYCODONE HYDROCHLORIDE 20 MG: 10 TABLET, FILM COATED, EXTENDED RELEASE ORAL at 22:19

## 2020-10-30 RX ADMIN — Medication 2 PUFF: at 08:12

## 2020-10-30 RX ADMIN — NYSTATIN 500000 UNITS: 100000 SUSPENSION ORAL at 20:26

## 2020-10-30 RX ADMIN — ACETAMINOPHEN 650 MG: 325 TABLET ORAL at 12:51

## 2020-10-30 RX ADMIN — ATORVASTATIN CALCIUM 20 MG: 20 TABLET, FILM COATED ORAL at 20:27

## 2020-10-30 RX ADMIN — ASPIRIN 81 MG: 81 TABLET, COATED ORAL at 09:50

## 2020-10-30 RX ADMIN — PREGABALIN 50 MG: 50 CAPSULE ORAL at 22:20

## 2020-10-30 RX ADMIN — Medication 10 ML: at 10:22

## 2020-10-30 RX ADMIN — BENZTROPINE MESYLATE 1 MG: 1 TABLET ORAL at 20:27

## 2020-10-30 RX ADMIN — Medication 2 PUFF: at 11:47

## 2020-10-30 RX ADMIN — ENOXAPARIN SODIUM 30 MG: 30 INJECTION SUBCUTANEOUS at 09:53

## 2020-10-30 RX ADMIN — INSULIN LISPRO 3 UNITS: 100 INJECTION, SOLUTION INTRAVENOUS; SUBCUTANEOUS at 18:28

## 2020-10-30 RX ADMIN — OXYCODONE HYDROCHLORIDE 20 MG: 10 TABLET, FILM COATED, EXTENDED RELEASE ORAL at 09:51

## 2020-10-30 RX ADMIN — NYSTATIN 500000 UNITS: 100000 SUSPENSION ORAL at 12:51

## 2020-10-30 RX ADMIN — INSULIN LISPRO 20 UNITS: 100 INJECTION, SOLUTION INTRAVENOUS; SUBCUTANEOUS at 18:28

## 2020-10-30 RX ADMIN — FERROUS SULFATE TAB 325 MG (65 MG ELEMENTAL FE) 325 MG: 325 (65 FE) TAB at 09:50

## 2020-10-30 RX ADMIN — Medication 2 PUFF: at 08:13

## 2020-10-30 RX ADMIN — Medication 10 ML: at 20:28

## 2020-10-30 RX ADMIN — IPRATROPIUM BROMIDE AND ALBUTEROL SULFATE 1 AMPULE: .5; 3 SOLUTION RESPIRATORY (INHALATION) at 18:19

## 2020-10-30 ASSESSMENT — PAIN DESCRIPTION - LOCATION
LOCATION: GENERALIZED
LOCATION: HEAD
LOCATION: HEAD

## 2020-10-30 ASSESSMENT — PAIN SCALES - GENERAL
PAINLEVEL_OUTOF10: 5
PAINLEVEL_OUTOF10: 0
PAINLEVEL_OUTOF10: 3
PAINLEVEL_OUTOF10: 0
PAINLEVEL_OUTOF10: 3
PAINLEVEL_OUTOF10: 5
PAINLEVEL_OUTOF10: 7

## 2020-10-30 ASSESSMENT — PAIN DESCRIPTION - PAIN TYPE
TYPE: CHRONIC PAIN
TYPE: ACUTE PAIN
TYPE: ACUTE PAIN

## 2020-10-30 NOTE — PROGRESS NOTES
Pt transferred from [facility] to  [phone]. Oriented to room and call light system. Will monitor.

## 2020-10-30 NOTE — PROGRESS NOTES
Shift assessment complete. VSS, see doc flowsheets. Medications administered per MAR. Pt complains of generalized pain at an 8/10. Minimal appetite. Fall precautions in place, hourly rounding, call light and belongings in reach, bed in lowest position, wheels locked in place, side rails up x 2, walkways free of clutter, bed alarm on. Pt denies needs at this time, will continue to monitor.

## 2020-10-30 NOTE — PROGRESS NOTES
Reassessment complete. VSS. No acute changes. Fall precautions in place, hourly rounding, call light and belongings in reach, bed in lowest position, wheels locked in place, side rails up x 2, walkways free of clutter, bed alarm on. Will continue to monitor.

## 2020-10-30 NOTE — PROGRESS NOTES
pulmonary disease) ([facility]     Depression     Diabetes mellitus ([facility]     Diabetic polyneuropathy associated with type 2 diabetes mellitus ([facility] 2/2/2018    Dysthymia 2/2/2018    ESBL (extended spectrum beta-lactamase) producing bacteria infection 03/14/2018    urine    Fibromyalgia     Gastric ulcer, unspecified as acute or chronic, without mention of hemorrhage, perforation, or obstruction     GERD (gastroesophageal reflux disease)     Gout     HIGH CHOLESTEROL     Hypertension     Hypothyroidism     Severe persistent asthma without complication 1/8/8858    Thyroid disease     TIA (transient ischemic attack)     with occasional left leg and hand weakness        Past Surgical History:    has a past surgical history that includes Breast surgery; Hysterectomy (2005); Carpal tunnel release; Tonsillectomy; Finger contracture surgery; and Upper gastrointestinal endoscopy (2019). Social History:  Reviewed. reports that she has never smoked. She has never used smokeless tobacco. She reports that she does not drink alcohol or use drugs. Allergies: Allergies   Allergen Reactions    Iv Dye [Iodides] Anaphylaxis     allergic to ct scan dye, not the MRI    Diazepam Other (See Comments)    Trazodone And Nefazodone Other (See Comments)     Makes patient feel very sluggish       Family History:  Reviewed. family history includes Asthma in an other family member; Cancer in an other family member; Depression in an other family member; Diabetes in her mother and another family member; Heart Attack (age of onset: 72) in her father; High Blood Pressure in her mother; High Cholesterol in an other family member; Hypertension in an other family member; Migraines in an other family member. Denies family history of sudden cardiac death, arrhythmia, premature CAD    Home Meds:  Prior to Visit Medications    Medication Sig Taking?  Authorizing Provider   simvastatin (ZOCOR) 20 MG tablet TAKE 1 TABLET BY MOUTH NIGHTLY tongue every 5 minutes as needed for Chest pain up to max of 3 total doses. If no relief after 1 dose, call 911. Yes Joann Aquino MD   vitamin D3 (CHOLECALCIFEROL) 25 MCG (1000 UT) TABS tablet TAKE 3 TABLETS BY MOUTH ONE TIME A DAY Yes Maryjo Khan MD   levothyroxine (SYNTHROID) 150 MCG tablet Take 1 tablet by mouth every morning (before breakfast) Yes Maryjo Khan MD   diazePAM (VALIUM) 5 MG tablet Take 5 mg by mouth 2 times daily as needed for Anxiety. Yes Historical Provider, MD   cariprazine hcl (VRAYLAR) 1.5 MG capsule Take 1.5 mg by mouth daily Yes Historical Provider, MD   Febuxostat 80 MG TABS Take 80 mg by mouth daily Yes Historical Provider, MD   pregabalin (LYRICA) 50 MG capsule Take 50 mg by mouth 2 times daily. Yes Historical Provider, MD   lubiprostone (AMITIZA) 24 MCG capsule Take 24 mcg by mouth daily (with breakfast) Yes Historical Provider, MD   torsemide (DEMADEX) 100 MG tablet TAKE 1 TABLET BY MOUTH IN THE MORNING AND 1/2 TABLET IN THE EVENING  Yes Joann Aquino MD   ranolazine (RANEXA) 500 MG extended release tablet TAKE 1 TABLET BY MOUTH TWO TIMES A DAY  Yes Joann Aquino MD   leflunomide (ARAVA) 10 MG tablet Take 10 mg by mouth daily  Yes Historical Provider, MD   OXYGEN Inhale 3 L into the lungs nightly Sometimes with activity Yes Historical Provider, MD   oxyCODONE (OXYCONTIN) 20 MG extended release tablet Take 20 mg by mouth every 12 hours. Yes Historical Provider, MD   aspirin 81 MG EC tablet Take 81 mg by mouth daily Yes Historical Provider, MD   sulfaSALAzine (AZULFIDINE) 500 MG tablet Take 500 mg by mouth 3 times daily  Yes Historical Provider, MD   benztropine (COGENTIN) 1 MG tablet Take 1 mg by mouth nightly  Yes Historical Provider, MD   folic acid (FOLVITE) 1 MG tablet Take 1 mg by mouth daily Yes Historical Provider, MD   oxyCODONE-acetaminophen (PERCOCET)  MG per tablet Take 1 tablet by mouth every 4 hours as needed for Pain .  Earliest Fill Date: 6/8/17 Yes Trenda Music Herbeatriz, APRN - CNP   duloxetine (CYMBALTA) 60 MG capsule Take 60 mg by mouth 2 times daily.    Yes Historical Provider, MD RANKIN-UNRULY UF III MINI PEN NEEDLES 31G X 5 MM MISC use five times daily with insulin  Alisia Lopez MD   blood glucose test strips (FREESTYLE LITE) strip test blood sugar four times daily  Alisia Lopez MD        Scheduled Meds:   furosemide  40 mg Intravenous Once    budesonide-formoterol  2 puff Inhalation BID    insulin glargine  75 Units Subcutaneous BID    insulin lispro  15 Units Subcutaneous TID WC    aspirin  81 mg Oral Daily    benztropine  1 mg Oral Nightly    cariprazine hcl  1.5 mg Oral Daily    DULoxetine  60 mg Oral Daily    Febuxostat  80 mg Oral Daily    ferrous sulfate  325 mg Oral BID WC    folic acid  1 mg Oral Daily    levothyroxine  150 mcg Oral QAM AC    metoprolol tartrate  25 mg Oral BID    montelukast  10 mg Oral Daily    nystatin  500,000 Units Oral 4x Daily    pantoprazole  40 mg Oral QAM AC    oxyCODONE  20 mg Oral BID    pregabalin  50 mg Oral BID    ranolazine  500 mg Oral BID    atorvastatin  20 mg Oral Nightly    enoxaparin  30 mg Subcutaneous BID    insulin lispro  0-18 Units Subcutaneous TID WC    insulin lispro  0-9 Units Subcutaneous Nightly    sodium chloride flush  10 mL Intravenous 2 times per day    albuterol sulfate HFA  2 puff Inhalation 4x daily    And    ipratropium  2 puff Inhalation 4x daily     Continuous Infusions:   dextrose      dextrose       PRN Meds:perflutren lipid microspheres, glucose, dextrose, glucagon (rDNA), dextrose, nitroGLYCERIN, acetaminophen **OR** acetaminophen, glucose, dextrose, glucagon (rDNA), dextrose, sodium chloride flush, polyethylene glycol, ondansetron **OR** ondansetron, oxyCODONE     Review of Systems:  · Constitutional: Negative for fever, night sweats, chills, weight changes  · Skin: Negative for rash, pruritus, bleeding, blood clots, or bruising    · HEENT: Negative for vision changes, ringing in the ears, dysphagia, or swollen lymph nodes  · Respiratory: Reviewed in HPI  · Cardiovascular: Reviewed in HPI  · Gastrointestinal: Negative for abdominal pain, N/V/D, constipation, or black/tarry stools  · Genito-Urinary: Negative for dysuria, incontinence, or hematuria  · Musculoskeletal: Negative for joint swelling, muscle pain, or injuries  · Neurological/Psych: Negative for confusion, seizures, headaches, or TIA-like symptoms. No anxiety or depression. Physical Examination:  Vitals:    10/30/20 0814   BP:    Pulse:    Resp:    Temp:    SpO2: 97%      In: 240 [P.O.:240]  Out: 1450    Wt Readings from Last 3 Encounters:   10/30/20 186 lb 12.8 oz (84.7 kg)   10/07/20 187 lb (84.8 kg)   10/01/20 185 lb (83.9 kg)       Intake/Output Summary (Last 24 hours) at 10/30/2020 8248  Last data filed at 10/30/2020 0430  Gross per 24 hour   Intake 240 ml   Output 1850 ml   Net -1610 ml       Telemetry: Personally Reviewed  - Sinus rhythm   · Constitutional: Cooperative and in no apparent distress, and appears well nourished  · Skin: Warm and pink; no pallor, cyanosis, clubbing, or bruising   · HEENT: Symmetric and normocephalic. PERRL, EOM intact. Conjunctiva pink with clear sclera. Mucus membranes moist.   · Cardiovascular: Regular rate and rhythm. S1/S2 present without murmurs, no rubs or gallops. Peripheral pulses 2+, capillary refill < 3 seconds. No elevation of JVP. No peripheral edema. LLE edema  · Respiratory: Respirations symmetric and labored. Lungs with significant wheeze to auscultation, sounds tight   · Gastrointestinal: Abdomen soft and round. Bowel sounds normoactive without tenderness or masses. · Musculoskeletal: Bilateral upper and lower extremity strength 5/5 with full ROM  · Neurologic/Psych: Awake and orientated to person, place and time.  Calm affect, appropriate mood    Pertinent labs, diagnostic, device, and imaging results reviewed as a part of this visit    Labs:    BMP:   Recent Labs 10/29/20  0429 10/29/20  1800 10/30/20  0430   * 131* 136   K 4.4 4.4 4.1   CL 90* 88* 92*   CO2 31 31 34*   BUN 32* 45* 53*   CREATININE 1.2* 1.6* 1.3*     Estimated Creatinine Clearance: 49 mL/min (A) (based on SCr of 1.3 mg/dL (H)). CBC:   Recent Labs     10/28/20  2344 10/29/20  0429 10/30/20  0430   WBC 11.6* 8.5 11.1*   HGB 13.0 12.7 10.5*   HCT 39.6 39.1 32.2*   MCV 87.1 89.7 86.3    269 269     Thyroid:   Lab Results   Component Value Date    TSH 0.91 10/29/2020     Lipids:   Lab Results   Component Value Date    CHOL 177 02/18/2019    HDL 38 02/18/2019    TRIG 409 02/18/2019     LFTS:   Lab Results   Component Value Date    ALT 17 10/29/2020    AST 26 10/29/2020    ALKPHOS 103 10/29/2020    PROT 8.0 10/29/2020    PROT 6.6 05/16/2017    AGRATIO 1.0 10/29/2020    BILITOT <0.2 10/29/2020     Cardiac Enzymes:   Lab Results   Component Value Date    TROPONINI <0.01 10/28/2020    TROPONINI <0.01 10/28/2020    TROPONINI <0.01 03/25/2020     Coags:   Lab Results   Component Value Date    PROTIME 10.7 10/30/2020    INR 0.92 10/30/2020     ECG: 10/28/20  Normal sinus rhythm  Normal ECG  When compared with ECG of 24-MAR-2020 23:23,  No significant change was found    ECHO:  1/28/20   Summary   Normal left ventricle size, wall thickness and systolic function with an   estimated ejection fraction of 55-60%. No regional wall motion abnormalities are seen. E/e\"= 7.6 . Mild thickening of anterior & posterior leaflets of mitral valve. Mild tricuspid regurgitation. Indeterminate diastolic function. Stress Test: 7/1/20   Summary     There is normal isotope uptake at stress and rest. There is no evidence of     myocardial ischemia or scar.     Normal LV function.     Left ventricular ejection fraction of 59 %.     Overall findings represent a low risk scan.         Left Heart Cath 2/27/18:  [place] : Right  LM: bifurcating, MLI  LAD: mild plaquing with small vessel disease distally   LCx: no significant disease  RCA: MLI  LVEDP: 25 mmHg   No Ao gradient  Right Heart Cath   RA: 13  RV: 47/6/16  PA:   46/19    and mean of 32  PCWP: 21 mmHg   Sats:  Ao: 92%  RA: 61%  PA: 62% (x 2)   CO/CI : 6.1 L    CXR: 10/28/20  1. Patchy bibasilar opacities may represent multifocal pneumonia or pulmonary    edema. 2. Mild pulmonary edema.        Problem List:   Patient Active Problem List    Diagnosis Date Noted    Chronic heart failure with preserved ejection fraction (HCC)      Priority: High    Coronary artery disease due to lipid rich plaque      Priority: High    Renal insufficiency      Priority: High    Coronary artery disease involving native coronary artery of native heart without angina pectoris      Priority: High    Hypoxia      Priority: High    Hypervolemia      Priority: High    Chronic pain 07/16/2008     Priority: High    Malignant neoplasm of overlapping sites of left female breast ([facility] 12/06/2007     Priority: High    Iron deficiency anemia 03/18/2015     Priority: Low    Fibromyalgia 09/01/2011     Priority: Low    Acute hypoxemic respiratory failure ([facility] 10/29/2020    Thrush 09/23/2020    Scalp cyst 09/23/2020    Alopecia 08/16/2020    Aphthous ulcer of tongue 08/16/2020    Seborrhea capitis in adult 08/16/2020    Tension type headache 04/29/2020    Hyperglycemia 04/29/2020    Inattention 04/29/2020    Severe episode of recurrent major depressive disorder ([facility] 04/29/2020    Pneumonia 03/25/2020    Acute respiratory failure with hypoxemia ([facility] 03/25/2020    Tongue pain 02/02/2020    Excessive sweating 01/29/2020    Chest pain 10/31/2019    Hot flashes 10/21/2019    Edema of right lower extremity 07/10/2019    Vertigo 05/26/2019    Fatigue 05/26/2019    Enlargement of submandibular gland 04/10/2019    Jaw pain 04/10/2019    Pain in both lower extremities 04/10/2019    Environmental and seasonal allergies 01/21/2019    Hypersomnolence 01/21/2019    Right hand pain 07/03/2018    Fungal dermatitis 07/03/2018    Mouth pain 07/03/2018    Oropharyngeal dysphagia 04/10/2018    Constipation due to pain medication 04/10/2018    Weight loss counseling, encounter for     Complex care coordination     Encounter for medication counseling     Hesitancy of micturition     Pyelonephritis     History of nephrolithiasis     Immigrant with language difficulty     Class 1 obesity due to excess calories with body mass index (BMI) of 31.0 to 31.9 in adult     H/O TIA (transient ischemic attack) and stroke     Need for isolation     RA (rheumatoid arthritis) ([facility] 03/13/2018    Chronic congestive heart failure ([facility] 02/07/2018    Severe persistent asthma without complication 95/64/6454    Diabetic polyneuropathy associated with type 2 diabetes mellitus ([facility] 02/02/2018    SOB (shortness of breath)     Hiatal hernia with GERD     LUIS (obstructive sleep apnea)     Essential hypertension 01/12/2018    Poorly controlled type 2 diabetes mellitus ([facility] 01/12/2018    Asthma 01/12/2018    Hypothyroidism 01/12/2018    Anxiety and depression 01/12/2018    Hx of breast cancer 01/12/2018    Hyperlipidemia LDL goal <70 11/08/2017    Encounter for follow-up surveillance of breast cancer 11/08/2016    Lymphedema of upper extremity following lymphadenectomy 10/22/2015    Encounter for monitoring aromatase inhibitor therapy 10/22/2015        Assessment and Plan:     Heart failure with preserved EF  ~ stable ; + wheezing, neg LE edema   ~ Echo 1/2020: EF 55-60%  ~ pro BNP only 82 on admit   ~ mild pulm edema by CXR   ~ Receiving IV lasix with good UO  ~ wt appears to be increasing by bed scale, recommend standing scale. Strict I&O. Fluid restriction.      Coronary artery disease   Chronic chest pain   ~ troponin negative, ECG normal    ~ diffuse LAD disease by cath 2018  ~ recent SPECT 7/2020: no evidence of myocardial ischemia or scar   ~ ASA/ statin/ ranexa      Shortness of breath / respiratory failure   ~ HF vs PNA  ~ COVID negative   ~ significant wheezing to ausculation   ~ oxygen increased to 3L of O2 via NC, sats only mid 90s     Multiple medical conditions with risk of decompensation. All pertinent information and plan of care discussed with the physician. All questions and concerns were addressed to the patient. Alternatives to my treatment were discussed. I have discussed the above stated plan with patient and the nurse. The patient verbalized understanding and agreed with the plan. Thank you for allowing to us to participate in the care of [address] [place] [address].     Leana Elgin APRN-CNP  [address]   Office: [phone]

## 2020-10-30 NOTE — PROGRESS NOTES
O2 increased to 3L per pt request, stating she feels like she cannot breathe. Pt is very anxious. VSS, O2 sats 95%, lung sounds clear. Will continue to monitor.

## 2020-10-30 NOTE — PROGRESS NOTES
[facility] PROGRESS NOTE    10/30/2020 12:23 PM        Name: Duncan Blue . Admitted: 10/28/2020  Primary Care Provider: Krista Caldwell DO (Tel: [phone])      Subjective:  .     Admitted with shortness of breath and generalized fatigue  COVID negative  She is oxygen dependant at home    Feels better but reports weakness  COVID negative     She also endorses chronic pain from her RA    Reviewed interval ancillary notes    Current Medications  perflutren lipid microspheres (DEFINITY) injection 1.65 mg, ONCE PRN  glucose (GLUTOSE) 40 % oral gel 15 g, PRN  dextrose 50 % IV solution, PRN  glucagon (rDNA) injection 1 mg, PRN  dextrose 5 % solution, PRN  budesonide-formoterol (SYMBICORT) 160-4.5 MCG/ACT inhaler 2 puff, BID  insulin glargine (LANTUS;BASAGLAR) injection pen 75 Units, BID  insulin lispro (1 Unit Dial) 15 Units, TID WC  aspirin EC tablet 81 mg, Daily  benztropine (COGENTIN) tablet 1 mg, Nightly  cariprazine hcl (VRAYLAR) capsule 1.5 mg - PATIENT SUPPLIED, Daily  DULoxetine (CYMBALTA) extended release capsule 60 mg, Daily  Febuxostat (Uloric) Tablet 80 mg - PATIENT SUPPLIED, Daily  ferrous sulfate (IRON 325) tablet 231 mg, BID WC  folic acid (FOLVITE) tablet 1 mg, Daily  levothyroxine (SYNTHROID) tablet 150 mcg, QAM AC  metoprolol tartrate (LOPRESSOR) tablet 25 mg, BID  montelukast (SINGULAIR) tablet 10 mg, Daily  nitroGLYCERIN (NITROSTAT) SL tablet 0.4 mg, Q5 Min PRN  nystatin (MYCOSTATIN) 406662 UNIT/ML suspension 500,000 Units, 4x Daily  pantoprazole (PROTONIX) tablet 40 mg, QAM AC  oxyCODONE (OXYCONTIN) extended release tablet 20 mg, BID  pregabalin (LYRICA) capsule 50 mg, BID  ranolazine (RANEXA) extended release tablet 500 mg, BID  atorvastatin (LIPITOR) tablet 20 mg, Nightly  acetaminophen (TYLENOL) tablet 650 mg, Q6H PRN    Or  acetaminophen (TYLENOL) suppository 650 mg, Q6H PRN  enoxaparin Labs and Tests:  CBC:   Recent Labs     10/28/20  2344 10/29/20  0429 10/30/20  0430   WBC 11.6* 8.5 11.1*   HGB 13.0 12.7 10.5*    269 269     BMP:    Recent Labs     10/29/20  0429 10/29/20  1800 10/30/20  0430   * 131* 136   K 4.4 4.4 4.1   CL 90* 88* 92*   CO2 31 31 34*   BUN 32* 45* 53*   CREATININE 1.2* 1.6* 1.3*   GLUCOSE 304* 335* 214*     Hepatic:   Recent Labs     10/28/20  1656 10/28/20  2344 10/29/20  0429   AST 36 29 26   ALT 19 18 17   BILITOT <0.2 <0.2 <0.2   ALKPHOS 111 111 103       Results for Servando Valle (MRN [id number]) as of 10/30/2020 13:55   Ref. Range 10/30/2020 07:27 10/30/2020 11:44   POC Glucose Latest Ref Range: 70 - 99 mg/dl 139 (H) 259 (H)     AIC 7.8 in March 2020    Chest xray:  Patchy bibasilar opacities may represent multifocal pneumonia or pulmonary    edema. 2. Mild pulmonary edema. Echo:     Summary   Normal left ventricle size, wall thickness and systolic function with an   estimated ejection fraction of 55-60%. No regional wall motion abnormalities are seen. E/e\"= 7.6 . Mild thickening of anterior & posterior leaflets of mitral valve. Mild tricuspid regurgitation. Indeterminate diastolic function. Problem List  Principal Problem:    Acute respiratory failure with hypoxemia (HCC)  Active Problems:    Chronic pain    Coronary artery disease involving native coronary artery of native heart without angina pectoris    Essential hypertension    Poorly controlled type 2 diabetes mellitus ([address]    Hypothyroidism    Hiatal hernia with GERD    LUIS (obstructive sleep apnea)    Chronic congestive heart failure ([address]    Acute hypoxemic respiratory failure (HCC)  Resolved Problems:    * No resolved hospital problems. *       Assessment & Plan:   1. chronic respiratory failure with hypoxemia:  Currently on 2 liters which is her baseline. covid is jnegative. I have stopped the decadron. She remains afebrile.   Pro calcitonin is normal. 2. Pulmonary edema: On oral torsemide and aldactone. Add strict I & O , daily weights etc , cardiology is  Following. She is above her ideal weight   3. CAD:  Has normal stress test in July 2020. Continue home therapy of BB, ranexa, aldactone and dorsemide   4. Uncontrolled DM: BG values reviewed. Continue with lantus 75 units bid. Increase prandial to 20 with correction   5. Hypothyroidism: continue home therapy. TSH pending   6. Depression: continue home therapy   7. Fatigue: PT and OT added. 8. Anticipate home on Saturday. ..may need home care       Diet: DIET LOW SODIUM 2 GM; Carb Control: 4 carb choices (60 gms)/meal; 1800 ml  Code:Full Code  DVT PPX      HERMES Carey - CNP   10/30/2020 12:23 PM

## 2020-10-31 VITALS
SYSTOLIC BLOOD PRESSURE: 112 MMHG | BODY MASS INDEX: 36.71 KG/M2 | WEIGHT: 187 LBS | HEART RATE: 73 BPM | OXYGEN SATURATION: 98 % | TEMPERATURE: 97.8 F | DIASTOLIC BLOOD PRESSURE: 70 MMHG | HEIGHT: 60 IN | RESPIRATION RATE: 18 BRPM

## 2020-10-31 LAB
ANION GAP SERPL CALCULATED.3IONS-SCNC: 7 MMOL/L (ref 3–16)
BASOPHILS ABSOLUTE: 0.1 K/UL (ref 0–0.2)
BASOPHILS RELATIVE PERCENT: 0.8 %
BUN BLDV-MCNC: 50 MG/DL (ref 7–20)
CALCIUM SERPL-MCNC: 9.2 MG/DL (ref 8.3–10.6)
CHLORIDE BLD-SCNC: 96 MMOL/L (ref 99–110)
CO2: 38 MMOL/L (ref 21–32)
CREAT SERPL-MCNC: 1.1 MG/DL (ref 0.6–1.1)
D DIMER: 209 NG/ML DDU (ref 0–229)
EOSINOPHILS ABSOLUTE: 0.1 K/UL (ref 0–0.6)
EOSINOPHILS RELATIVE PERCENT: 0.8 %
FIBRINOGEN: 545 MG/DL (ref 200–397)
GFR AFRICAN AMERICAN: >60
GFR NON-AFRICAN AMERICAN: 52
GLUCOSE BLD-MCNC: 123 MG/DL (ref 70–99)
GLUCOSE BLD-MCNC: 169 MG/DL (ref 70–99)
GLUCOSE BLD-MCNC: 229 MG/DL (ref 70–99)
GLUCOSE BLD-MCNC: 238 MG/DL (ref 70–99)
GLUCOSE BLD-MCNC: 50 MG/DL (ref 70–99)
GLUCOSE BLD-MCNC: 60 MG/DL (ref 70–99)
HCT VFR BLD CALC: 34.3 % (ref 36–48)
HEMOGLOBIN: 11.3 G/DL (ref 12–16)
INR BLD: 0.85 (ref 0.86–1.14)
LYMPHOCYTES ABSOLUTE: 3.7 K/UL (ref 1–5.1)
LYMPHOCYTES RELATIVE PERCENT: 34.6 %
MCH RBC QN AUTO: 29.2 PG (ref 26–34)
MCHC RBC AUTO-ENTMCNC: 33 G/DL (ref 31–36)
MCV RBC AUTO: 88.6 FL (ref 80–100)
MONOCYTES ABSOLUTE: 0.9 K/UL (ref 0–1.3)
MONOCYTES RELATIVE PERCENT: 8.8 %
NEUTROPHILS ABSOLUTE: 5.9 K/UL (ref 1.7–7.7)
NEUTROPHILS RELATIVE PERCENT: 55 %
PDW BLD-RTO: 14.6 % (ref 12.4–15.4)
PERFORMED ON: ABNORMAL
PLATELET # BLD: 284 K/UL (ref 135–450)
PMV BLD AUTO: 7.9 FL (ref 5–10.5)
POTASSIUM SERPL-SCNC: 3.7 MMOL/L (ref 3.5–5.1)
PROTHROMBIN TIME: 9.9 SEC (ref 10–13.2)
RBC # BLD: 3.87 M/UL (ref 4–5.2)
SODIUM BLD-SCNC: 141 MMOL/L (ref 136–145)
WBC # BLD: 10.7 K/UL (ref 4–11)

## 2020-10-31 PROCEDURE — 99231 SBSQ HOSP IP/OBS SF/LOW 25: CPT | Performed by: INTERNAL MEDICINE

## 2020-10-31 PROCEDURE — 6370000000 HC RX 637 (ALT 250 FOR IP): Performed by: NURSE PRACTITIONER

## 2020-10-31 PROCEDURE — 94761 N-INVAS EAR/PLS OXIMETRY MLT: CPT

## 2020-10-31 PROCEDURE — 6370000000 HC RX 637 (ALT 250 FOR IP): Performed by: INTERNAL MEDICINE

## 2020-10-31 PROCEDURE — 6370000000 HC RX 637 (ALT 250 FOR IP): Performed by: HOSPITALIST

## 2020-10-31 PROCEDURE — 6360000002 HC RX W HCPCS: Performed by: NURSE PRACTITIONER

## 2020-10-31 PROCEDURE — 94669 MECHANICAL CHEST WALL OSCILL: CPT

## 2020-10-31 PROCEDURE — 85610 PROTHROMBIN TIME: CPT

## 2020-10-31 PROCEDURE — 85384 FIBRINOGEN ACTIVITY: CPT

## 2020-10-31 PROCEDURE — 80048 BASIC METABOLIC PNL TOTAL CA: CPT

## 2020-10-31 PROCEDURE — 94640 AIRWAY INHALATION TREATMENT: CPT

## 2020-10-31 PROCEDURE — 85379 FIBRIN DEGRADATION QUANT: CPT

## 2020-10-31 PROCEDURE — 2580000003 HC RX 258: Performed by: HOSPITALIST

## 2020-10-31 PROCEDURE — 85025 COMPLETE CBC W/AUTO DIFF WBC: CPT

## 2020-10-31 PROCEDURE — 2700000000 HC OXYGEN THERAPY PER DAY

## 2020-10-31 RX ORDER — INSULIN LISPRO 100 [IU]/ML
0-6 INJECTION, SOLUTION INTRAVENOUS; SUBCUTANEOUS
Status: DISCONTINUED | OUTPATIENT
Start: 2020-10-31 | End: 2020-10-31 | Stop reason: HOSPADM

## 2020-10-31 RX ORDER — FUROSEMIDE 10 MG/ML
80 INJECTION INTRAMUSCULAR; INTRAVENOUS ONCE
Status: CANCELLED | OUTPATIENT
Start: 2020-10-31 | End: 2020-10-31

## 2020-10-31 RX ORDER — INSULIN LISPRO 100 [IU]/ML
10 INJECTION, SOLUTION INTRAVENOUS; SUBCUTANEOUS
Status: DISCONTINUED | OUTPATIENT
Start: 2020-10-31 | End: 2020-10-31 | Stop reason: HOSPADM

## 2020-10-31 RX ORDER — INSULIN LISPRO 100 [IU]/ML
0-3 INJECTION, SOLUTION INTRAVENOUS; SUBCUTANEOUS NIGHTLY
Status: DISCONTINUED | OUTPATIENT
Start: 2020-10-31 | End: 2020-10-31 | Stop reason: HOSPADM

## 2020-10-31 RX ADMIN — INSULIN LISPRO 10 UNITS: 100 INJECTION, SOLUTION INTRAVENOUS; SUBCUTANEOUS at 09:47

## 2020-10-31 RX ADMIN — ACETAMINOPHEN 650 MG: 325 TABLET ORAL at 07:08

## 2020-10-31 RX ADMIN — METOPROLOL TARTRATE 25 MG: 25 TABLET, FILM COATED ORAL at 08:19

## 2020-10-31 RX ADMIN — OXYCODONE HYDROCHLORIDE 20 MG: 10 TABLET, FILM COATED, EXTENDED RELEASE ORAL at 08:19

## 2020-10-31 RX ADMIN — IPRATROPIUM BROMIDE AND ALBUTEROL SULFATE 1 AMPULE: .5; 3 SOLUTION RESPIRATORY (INHALATION) at 10:04

## 2020-10-31 RX ADMIN — INSULIN LISPRO 2 UNITS: 100 INJECTION, SOLUTION INTRAVENOUS; SUBCUTANEOUS at 17:20

## 2020-10-31 RX ADMIN — FEBUXOSTAT 80 MG: 80 TABLET, FILM COATED ORAL at 08:20

## 2020-10-31 RX ADMIN — FERROUS SULFATE TAB 325 MG (65 MG ELEMENTAL FE) 325 MG: 325 (65 FE) TAB at 08:19

## 2020-10-31 RX ADMIN — ASPIRIN 81 MG: 81 TABLET, COATED ORAL at 08:20

## 2020-10-31 RX ADMIN — Medication 2 PUFF: at 10:05

## 2020-10-31 RX ADMIN — LEVOTHYROXINE SODIUM 150 MCG: 0.15 TABLET ORAL at 06:15

## 2020-10-31 RX ADMIN — RANOLAZINE 500 MG: 500 TABLET, FILM COATED, EXTENDED RELEASE ORAL at 08:19

## 2020-10-31 RX ADMIN — INSULIN LISPRO 10 UNITS: 100 INJECTION, SOLUTION INTRAVENOUS; SUBCUTANEOUS at 12:05

## 2020-10-31 RX ADMIN — INSULIN LISPRO 1 UNITS: 100 INJECTION, SOLUTION INTRAVENOUS; SUBCUTANEOUS at 07:51

## 2020-10-31 RX ADMIN — Medication 10 ML: at 08:21

## 2020-10-31 RX ADMIN — MONTELUKAST SODIUM 10 MG: 10 TABLET, COATED ORAL at 08:19

## 2020-10-31 RX ADMIN — IPRATROPIUM BROMIDE AND ALBUTEROL SULFATE 1 AMPULE: .5; 3 SOLUTION RESPIRATORY (INHALATION) at 12:35

## 2020-10-31 RX ADMIN — INSULIN LISPRO 10 UNITS: 100 INJECTION, SOLUTION INTRAVENOUS; SUBCUTANEOUS at 17:21

## 2020-10-31 RX ADMIN — PREGABALIN 50 MG: 50 CAPSULE ORAL at 09:47

## 2020-10-31 RX ADMIN — NYSTATIN 500000 UNITS: 100000 SUSPENSION ORAL at 12:04

## 2020-10-31 RX ADMIN — FOLIC ACID 1 MG: 1 TABLET ORAL at 08:19

## 2020-10-31 RX ADMIN — NYSTATIN 500000 UNITS: 100000 SUSPENSION ORAL at 08:20

## 2020-10-31 RX ADMIN — DULOXETINE HYDROCHLORIDE 60 MG: 60 CAPSULE, DELAYED RELEASE ORAL at 08:19

## 2020-10-31 RX ADMIN — INSULIN LISPRO 2 UNITS: 100 INJECTION, SOLUTION INTRAVENOUS; SUBCUTANEOUS at 12:05

## 2020-10-31 RX ADMIN — FERROUS SULFATE TAB 325 MG (65 MG ELEMENTAL FE) 325 MG: 325 (65 FE) TAB at 15:40

## 2020-10-31 RX ADMIN — NYSTATIN 500000 UNITS: 100000 SUSPENSION ORAL at 15:40

## 2020-10-31 RX ADMIN — PANTOPRAZOLE SODIUM 40 MG: 40 TABLET, DELAYED RELEASE ORAL at 06:15

## 2020-10-31 RX ADMIN — ENOXAPARIN SODIUM 40 MG: 40 INJECTION SUBCUTANEOUS at 08:20

## 2020-10-31 ASSESSMENT — PAIN SCALES - GENERAL
PAINLEVEL_OUTOF10: 0
PAINLEVEL_OUTOF10: 4
PAINLEVEL_OUTOF10: 0
PAINLEVEL_OUTOF10: 3
PAINLEVEL_OUTOF10: 0
PAINLEVEL_OUTOF10: 0

## 2020-10-31 ASSESSMENT — PAIN DESCRIPTION - DESCRIPTORS: DESCRIPTORS: HEADACHE

## 2020-10-31 NOTE — PROGRESS NOTES
Data- discharge order received, pt verbalized agreement to discharge, needs for [facility] with [facility] for pt/ot, ANILA reviewed and signed by MD, to be completed by RN. Action- AVS prepared, discharge instructions prepared and given to pt, medication information packet given r/t NEW or CHANGED prescriptions, pt verbalized understanding further self-review. D/C instruction summary: Diet- general, Activity- as tolerated, follow up with Primary Care Physician [facility], [address] [place] [address] appointment to be made by pt, immunizations reviewed, medications prescriptions to be filled in pt preferred pharmacy. Contact information provided to above agencies used. Response- Case Management/ reported faxing completed ANILA and AVS to needed HHC/DME services stated above. Pt belongings gathered, IV removed, pt dressed appropriately. Disposition is home with HHC/DME as stated above, transported with belongings, taken to lobby via w/c with family, no complications.

## 2020-10-31 NOTE — PLAN OF CARE
Problem: Falls - Risk of:  Goal: Will remain free from falls  Description: Will remain free from falls  Outcome: Ongoing     Problem: Falls - Risk of:  Goal: Absence of physical injury  Description: Absence of physical injury  Outcome: Ongoing     Problem: Skin Integrity:  Goal: Will show no infection signs and symptoms  Description: Will show no infection signs and symptoms  Outcome: Ongoing     Problem: Pain:  Goal: Pain level will decrease  Description: Pain level will decrease  Outcome: Ongoing

## 2020-10-31 NOTE — PROGRESS NOTES
Dysthymia 2/2/2018    ESBL (extended spectrum beta-lactamase) producing bacteria infection 03/14/2018    urine    Fibromyalgia     Gastric ulcer, unspecified as acute or chronic, without mention of hemorrhage, perforation, or obstruction     GERD (gastroesophageal reflux disease)     Gout     HIGH CHOLESTEROL     Hypertension     Hypothyroidism     Severe persistent asthma without complication 6/0/1276    Thyroid disease     TIA (transient ischemic attack)     with occasional left leg and hand weakness        Past Surgical History:    has a past surgical history that includes Breast surgery; Hysterectomy (2005); Carpal tunnel release; Tonsillectomy; Finger contracture surgery; and Upper gastrointestinal endoscopy (2019). Social History:  Reviewed. reports that she has never smoked. She has never used smokeless tobacco. She reports that she does not drink alcohol or use drugs. Allergies: Allergies   Allergen Reactions    Iv Dye [Iodides] Anaphylaxis     allergic to ct scan dye, not the MRI    Diazepam Other (See Comments)    Trazodone And Nefazodone Other (See Comments)     Makes patient feel very sluggish       Family History:  Reviewed. family history includes Asthma in an other family member; Cancer in an other family member; Depression in an other family member; Diabetes in her mother and another family member; Heart Attack (age of onset: 72) in her father; High Blood Pressure in her mother; High Cholesterol in an other family member; Hypertension in an other family member; Migraines in an other family member. Denies family history of sudden cardiac death, arrhythmia, premature CAD    Home Meds:  Prior to Visit Medications    Medication Sig Taking?  Authorizing Provider   simvastatin (ZOCOR) 20 MG tablet TAKE 1 TABLET BY MOUTH NIGHTLY  Yes Iván Powell MD   metoprolol tartrate (LOPRESSOR) 25 MG tablet TAKE 1 TABLET BY MOUTH TWO TIMES A DAY  Yes Iván Powell MD   Ertugliflozin L-PyroglutamicAc (STEGLATRO) 5 MG TABS TAKE 1 TABLET BY MOUTH ONE TIME A DAY Yes Christina Saldaña MD   spironolactone (ALDACTONE) 50 MG tablet TAKE 2 TABLETS BY MOUTH IN THE MORNING AND ONE TABLET IN THE EVENING Yes Sarai Adam MD   omeprazole (PRILOSEC) 20 MG delayed release capsule TAKE 1 CAPSULE BY MOUTH TWO TIMES A DAY  Yes Matty Tellez DO   loratadine (CLARITIN) 10 MG tablet TAKE 1 TABLET BY MOUTH ONE TIME A DAY  Yes Matty Tellez DO   tiZANidine (ZANAFLEX) 2 MG tablet TAKE 1 TABLET BY MOUTH EVERY EIGHT HOURS AS NEEDED FOR PAIN AND SPASM Yes Matty Tellez, DO   nystatin (MYCOSTATIN) 802565 UNIT/ML suspension Take 5 mLs by mouth 4 times daily Yes Matty Tellez DO   Magic Mouthwash (MIRACLE MOUTHWASH) Swish and spit 5 mLs 4 times daily as needed for Irritation or Pain Yes Matty Tellez DO   montelukast (SINGULAIR) 10 MG tablet TAKE 1 TABLET BY MOUTH ONE TIME A DAY  Yes Addison Patterson MD   ferrous sulfate (IRON 325) 325 (65 Fe) MG tablet TAKE 1 TABLET BY MOUTH TWO TIMES A DAY WITH MEALS Yes Matty Tellez DO   lidocaine viscous hcl (XYLOCAINE) 2 % SOLN solution Take 5 mLs by mouth every 3 hours as needed for Irritation or Pain Yes Matty Tellez, DO   ketoconazole (NIZORAL) 2 % shampoo Apply topically daily as needed. Yes Matty Tellez DO   Insulin Degludec (TRESIBA FLEXTOUCH) 200 UNIT/ML SOPN inject 160 units subcutaneously once daily Yes Christina Saldaña MD   NOVOLOG FLEXPEN 100 UNIT/ML injection pen INJECT 30 TO 50 UNITS INTO THE SKIN THREE TIMES DAILY BEFORE MEALS Yes Christina Saldaña MD   metOLazone (ZAROXOLYN) 2.5 MG tablet TAKE 1 TABLET BY MOUTH TWO TIMES A WEEK AS NEEDED for weight over 180 pounds. do not take two days in a row Yes Sarai Adam MD   nitroGLYCERIN (NITROSTAT) 0.4 MG SL tablet Place 1 tablet under the tongue every 5 minutes as needed for Chest pain up to max of 3 total doses. If no relief after 1 dose, call 911.  Yes Sarai Adam MD   vitamin D3 (CHOLECALCIFEROL) 25 MCG (1000 UT) TABS tablet TAKE 3 TABLETS BY MOUTH ONE TIME A DAY Yes Pat Walker MD   levothyroxine (SYNTHROID) 150 MCG tablet Take 1 tablet by mouth every morning (before breakfast) Yes Pat Walker MD   diazePAM (VALIUM) 5 MG tablet Take 5 mg by mouth 2 times daily as needed for Anxiety. Yes Historical Provider, MD   cariprazine hcl (VRAYLAR) 1.5 MG capsule Take 1.5 mg by mouth daily Yes Historical Provider, MD   Febuxostat 80 MG TABS Take 80 mg by mouth daily Yes Historical Provider, MD   pregabalin (LYRICA) 50 MG capsule Take 50 mg by mouth 2 times daily. Yes Historical Provider, MD   lubiprostone (AMITIZA) 24 MCG capsule Take 24 mcg by mouth daily (with breakfast) Yes Historical Provider, MD   torsemide (DEMADEX) 100 MG tablet TAKE 1 TABLET BY MOUTH IN THE MORNING AND 1/2 TABLET IN THE EVENING  Yes Trena Hatch MD   ranolazine (RANEXA) 500 MG extended release tablet TAKE 1 TABLET BY MOUTH TWO TIMES A DAY  Yes Trena Hatch MD   leflunomide (ARAVA) 10 MG tablet Take 10 mg by mouth daily  Yes Historical Provider, MD   OXYGEN Inhale 3 L into the lungs nightly Sometimes with activity Yes Historical Provider, MD   oxyCODONE (OXYCONTIN) 20 MG extended release tablet Take 20 mg by mouth every 12 hours. Yes Historical Provider, MD   aspirin 81 MG EC tablet Take 81 mg by mouth daily Yes Historical Provider, MD   sulfaSALAzine (AZULFIDINE) 500 MG tablet Take 500 mg by mouth 3 times daily  Yes Historical Provider, MD   benztropine (COGENTIN) 1 MG tablet Take 1 mg by mouth nightly  Yes Historical Provider, MD   folic acid (FOLVITE) 1 MG tablet Take 1 mg by mouth daily Yes Historical Provider, MD   oxyCODONE-acetaminophen (PERCOCET)  MG per tablet Take 1 tablet by mouth every 4 hours as needed for Pain . Earliest Fill Date: 6/8/17 Yes HERMES Del Real - CNP   duloxetine (CYMBALTA) 60 MG capsule Take 60 mg by mouth 2 times daily.    Yes Historical Provider, MD DIALLO UF III MINI PEN NEEDLES dysuria, incontinence, or hematuria  · Musculoskeletal: Negative for joint swelling, muscle pain, or injuries  · Neurological/Psych: Negative for confusion, seizures, headaches, or TIA-like symptoms. No anxiety or depression. Physical Examination:  Vitals:    10/31/20 0600   BP: 108/67   Pulse: 68   Resp: 16   Temp: 97.9 °F (36.6 °C)   SpO2: 97%      In: -   Out: 1100    Wt Readings from Last 3 Encounters:   10/31/20 187 lb (84.8 kg)   10/07/20 187 lb (84.8 kg)   10/01/20 185 lb (83.9 kg)       Intake/Output Summary (Last 24 hours) at 10/31/2020 0640  Last data filed at 10/30/2020 1841  Gross per 24 hour   Intake --   Output 1100 ml   Net -1100 ml       Telemetry: Personally Reviewed  - Sinus rhythm   · Constitutional: Cooperative and in no apparent distress, and appears well nourished  · Skin: Warm and pink; no pallor, cyanosis, clubbing, or bruising   · HEENT: Symmetric and normocephalic. PERRL, EOM intact. Conjunctiva pink with clear sclera. Mucus membranes moist.   · Cardiovascular: Regular rate and rhythm. S1/S2 present without murmurs, no rubs or gallops. Peripheral pulses 2+, capillary refill < 3 seconds. No elevation of JVP. No peripheral edema. LLE edema  · Respiratory: Respirations symmetric and labored. Lungs with significant wheeze to auscultation, sounds tight   · Gastrointestinal: Abdomen soft and round. Bowel sounds normoactive without tenderness or masses. · Musculoskeletal: Bilateral upper and lower extremity strength 5/5 with full ROM  · Neurologic/Psych: Awake and orientated to person, place and time. Calm affect, appropriate mood    Pertinent labs, diagnostic, device, and imaging results reviewed as a part of this visit    Labs:    BMP:   Recent Labs     10/29/20  0429 10/29/20  1800 10/30/20  0430   * 131* 136   K 4.4 4.4 4.1   CL 90* 88* 92*   CO2 31 31 34*   BUN 32* 45* 53*   CREATININE 1.2* 1.6* 1.3*     Estimated Creatinine Clearance: 49 mL/min (A) (based on SCr of 1.3 mg/dL (H)). CBC:   Recent Labs     10/29/20  0429 10/30/20  0430 10/31/20  0600   WBC 8.5 11.1* 10.7   HGB 12.7 10.5* 11.3*   HCT 39.1 32.2* 34.3*   MCV 89.7 86.3 88.6    269 284     Thyroid:   Lab Results   Component Value Date    TSH 0.91 10/29/2020     Lipids:   Lab Results   Component Value Date    CHOL 177 02/18/2019    HDL 38 02/18/2019    TRIG 409 02/18/2019     LFTS:   Lab Results   Component Value Date    ALT 17 10/29/2020    AST 26 10/29/2020    ALKPHOS 103 10/29/2020    PROT 8.0 10/29/2020    PROT 6.6 05/16/2017    AGRATIO 1.0 10/29/2020    BILITOT <0.2 10/29/2020     Cardiac Enzymes:   Lab Results   Component Value Date    TROPONINI <0.01 10/28/2020    TROPONINI <0.01 10/28/2020    TROPONINI <0.01 03/25/2020     Coags:   Lab Results   Component Value Date    PROTIME 10.7 10/30/2020    INR 0.92 10/30/2020     ECG: 10/28/20  Normal sinus rhythm  Normal ECG  When compared with ECG of 24-MAR-2020 23:23,  No significant change was found    ECHO:  1/28/20   Summary   Normal left ventricle size, wall thickness and systolic function with an   estimated ejection fraction of 55-60%. No regional wall motion abnormalities are seen. E/e\"= 7.6 . Mild thickening of anterior & posterior leaflets of mitral valve. Mild tricuspid regurgitation. Indeterminate diastolic function. Stress Test: 7/1/20   Summary     There is normal isotope uptake at stress and rest. There is no evidence of     myocardial ischemia or scar.     Normal LV function.     Left ventricular ejection fraction of 59 %.     Overall findings represent a low risk scan. Left Heart Cath 2/27/18:  Dominance : Right  LM: bifurcating, MLI  LAD: mild plaquing with small vessel disease distally   LCx: no significant disease  RCA: MLI  LVEDP: 25 mmHg   No Ao gradient  Right Heart Cath   RA: 13  RV: 47/6/16  PA:   46/19    and mean of 32  PCWP: 21 mmHg   Sats:  Ao: 92%  RA: 61%  PA: 62% (x 2)   CO/CI : 6.1 L    CXR: 10/28/20  1.  Patchy bibasilar Encounter for medication counseling     Hesitancy of micturition     Pyelonephritis     History of nephrolithiasis     Immigrant with language difficulty     Class 1 obesity due to excess calories with body mass index (BMI) of 31.0 to 31.9 in adult     H/O TIA (transient ischemic attack) and stroke     Need for isolation     RA (rheumatoid arthritis) ([facility] 03/13/2018    Chronic congestive heart failure ([facility] 02/07/2018    Severe persistent asthma without complication 84/18/9752    Diabetic polyneuropathy associated with type 2 diabetes mellitus ([facility] 02/02/2018    SOB (shortness of breath)     Hiatal hernia with GERD     LUIS (obstructive sleep apnea)     Essential hypertension 01/12/2018    Poorly controlled type 2 diabetes mellitus ([facility] 01/12/2018    Asthma 01/12/2018    Hypothyroidism 01/12/2018    Anxiety and depression 01/12/2018    Hx of breast cancer 01/12/2018    Hyperlipidemia LDL goal <70 11/08/2017    Encounter for follow-up surveillance of breast cancer 11/08/2016    Lymphedema of upper extremity following lymphadenectomy 10/22/2015    Encounter for monitoring aromatase inhibitor therapy 10/22/2015        Assessment and Plan:     Heart failure with preserved EF  ~ stable ; + wheezing, neg LE edema   ~ Echo 1/2020: EF 55-60%  ~ pro BNP only 82 on admit   ~ mild pulm edema by CXR   ~ resume home diuretics  - follow up with cardiology as outpatient     Coronary artery disease   Chronic chest pain   ~ troponin negative, ECG normal    ~ diffuse LAD disease by cath 2018  ~ recent SPECT 7/2020: no evidence of myocardial ischemia or scar   ~ ASA/ statin/ ranexa      Shortness of breath / respiratory failure   ~ HF vs PNA  ~ COVID negative   ~ significant wheezing to ausculation   ~ oxygen increased to 3L of O2 via NC, sats only mid 90s     Multiple medical conditions with risk of decompensation. All pertinent information and plan of care discussed with the physician.     All questions and concerns were addressed to the patient. Alternatives to my treatment were discussed. I have discussed the above stated plan with patient and the nurse. The patient verbalized understanding and agreed with the plan. Thank you for allowing to us to participate in the care of [address] [place] [address].

## 2020-10-31 NOTE — PROGRESS NOTES
Physical Therapy  Patient requesting to be seen by female therapist however, patient informed all female therapists are gone for today. Patient and spouse then agreeable to patient being seen by male therapist if patient can have hospital pants and if female RN is present. RN currently in [facility] r/o room and is unable to assist PT. RN informed this therapist to disregard therapy order as patient is going home today regardless.     Joyce Zamora, DPT, ATC-R [id number]

## 2020-10-31 NOTE — PROGRESS NOTES
Pt stated that she felt her sugar was low. Checked and BS was 60 gave her orange juice.  Will check again at 0700

## 2020-10-31 NOTE — PROGRESS NOTES
Pt and family requested to order liver US while pt is inpatient since it was scheduled as outpatient at this time. Also, pt requested to order home dose percocet for pain. NP made aware of requests via perfect serve. Awaiting orders.

## 2020-10-31 NOTE — DISCHARGE SUMMARY
[address] [facility] HOSPITALISTS DISCHARGE SUMMARY    Patient Demographics    Patient. [facility]  Date of Birth. 1970  MRN. [id number]     Primary care provider. [facility],   (Tel: [phone])    Admit date: 10/28/2020    Discharge date 10/31/2020   Note Date: 10/31/2020     Reason for Hospitalization. Chief Complaint   Patient presents with    Shortness of Breath     Pt arrived via [facility] from home. [facility] call was for N/V, pt 90% RA. Speaks Danish. Significant Findings. Principal Problem:    Acute respiratory failure with hypoxemia (HCC)  Active Problems:    Chronic pain    Coronary artery disease involving native coronary artery of native heart without angina pectoris    Essential hypertension    Poorly controlled type 2 diabetes mellitus ([facility]    Hypothyroidism    Hiatal hernia with GERD    LUIS (obstructive sleep apnea)    Chronic congestive heart failure ([facility]    Acute hypoxemic respiratory failure (HCC)  Resolved Problems:    * No resolved hospital problems. *       Problems and results from this hospitalization that need follow up. 1. [facility] :   Follow up with cardiology and PCP    Significant test results and incidental findings. AIC 7.8 in March 2020     Chest xray:  Patchy bibasilar opacities may represent multifocal pneumonia or pulmonary    edema. 2. Mild pulmonary edema.          Echo:      Summary   Normal left ventricle size, wall thickness and systolic function with an   estimated ejection fraction of 55-60%.   No regional wall motion abnormalities are seen. E/e\"= 7.6 .   Mild thickening of anterior & posterior leaflets of mitral valve.   Mild tricuspid regurgitation.   Indeterminate diastolic function.     Invasive procedures and treatments. [facility] Course. The patient was admitted with generalized weakness and fatigue.   She was admitted and treated for the followin. chronic respiratory failure with hypoxemia:  Currently on 2 liters which is her baseline. covid is jnegative. I have stopped the decadron. She remains afebrile. Pro calcitonin is normal.   2. Pulmonary edema:  She was diuresed and followed by cardiology    She is above her ideal weight   3. CAD:  Has normal stress test in 2020. Continue home therapy of BB, ranexa, aldactone and torsemide   4. Uncontrolled DM: BG values reviewed. Continue with home therapy she has follow up with endo  5. Hypothyroidism: continue home therapy. TSH in range   6. Depression: continue home therapy   7. Fatigue: PT and OT added. 8. [facility]    Consults. IP CONSULT TO HOSPITALIST  IP CONSULT TO CRITICAL CARE  IP CONSULT TO CARDIOLOGY    Physical examination on discharge day. /70   Pulse 71   Temp 97.8 °F (36.6 °C) (Oral)   Resp 18   Ht 5' (1.524 m)   Wt 187 lb (84.8 kg)   SpO2 95%   BMI 36.52 kg/m²   General appearance. Alert. Looks comfortable. Looks chronically ill  HEENT. Sclera clear. Moist mucus membranes. Cardiovascular. Regular rate and rhythm, normal S1, S2. No murmur. Respiratory. Not using accessory muscles. Clear to auscultation bilaterally, no wheeze. Gastrointestinal. Abdomen soft, non-tender, not distended, normal bowel sounds  Neurology. Facial symmetry. No speech deficits. Moving all extremities equally. Extremities. No edema in lower extremities. Skin. Warm, dry, normal turgor, chronic left arm lymph  Edema     Condition at time of discharge stable    Medication instructions provided to patient at discharge.      Medication List      CONTINUE taking these medications    aspirin 81 MG EC tablet     B-D UF III MINI PEN NEEDLES 31G X 5 MM Misc  Generic drug:  Insulin Pen Needle  use five times daily with insulin     benztropine 1 MG tablet  Commonly known as:  COGENTIN     diazePAM 5 MG tablet  Commonly known as:  VALIUM     DULoxetine 60 MG extended release capsule  Commonly known as:  CYMBALTA     Febuxostat 80 MG Tabs     ferrous sulfate 325 (65 Fe) MG tablet  Commonly known as:  IRON 325  TAKE 1 TABLET BY MOUTH TWO TIMES A DAY WITH MEALS     folic acid 1 MG tablet  Commonly known as:  FOLVITE     FREESTYLE LITE strip  Generic drug:  blood glucose test strips  test blood sugar four times daily     ketoconazole 2 % shampoo  Commonly known as:  Nizoral  Apply topically daily as needed. leflunomide 10 MG tablet  Commonly known as:  ARAVA     levothyroxine 150 MCG tablet  Commonly known as:  Synthroid  Take 1 tablet by mouth every morning (before breakfast)     lidocaine viscous hcl 2 % Soln solution  Commonly known as:  XYLOCAINE  Take 5 mLs by mouth every 3 hours as needed for Irritation or Pain     loratadine 10 MG tablet  Commonly known as:  CLARITIN  TAKE 1 TABLET BY MOUTH ONE TIME A DAY     lubiprostone 24 MCG capsule  Commonly known as:  AMITIZA     Magic Mouthwash  Commonly known as:  Miracle Mouthwash  Swish and spit 5 mLs 4 times daily as needed for Irritation or Pain     metOLazone 2.5 MG tablet  Commonly known as:  ZAROXOLYN  TAKE 1 TABLET BY MOUTH TWO TIMES A WEEK AS NEEDED for weight over 180 pounds. do not take two days in a row     metoprolol tartrate 25 MG tablet  Commonly known as:  LOPRESSOR  TAKE 1 TABLET BY MOUTH TWO TIMES A DAY     montelukast 10 MG tablet  Commonly known as:  SINGULAIR  TAKE 1 TABLET BY MOUTH ONE TIME A DAY     nitroGLYCERIN 0.4 MG SL tablet  Commonly known as:  NITROSTAT  Place 1 tablet under the tongue every 5 minutes as needed for Chest pain up to max of 3 total doses. If no relief after 1 dose, call 911.      NovoLOG FlexPen 100 UNIT/ML injection pen  Generic drug:  insulin aspart  INJECT 30 TO 50 UNITS INTO THE SKIN THREE TIMES DAILY BEFORE MEALS     nystatin 041693 UNIT/ML suspension  Commonly known as:  MYCOSTATIN  Take 5 mLs by mouth 4 times daily     omeprazole 20 MG delayed release capsule  Commonly known as:  PRILOSEC  TAKE 1 CAPSULE BY MOUTH TWO TIMES A DAY     oxyCODONE-acetaminophen  MG per tablet  Commonly known as:  PERCOCET  Take 1 tablet by mouth every 4 hours as needed for Pain .  Earliest Fill Date: 6/8/17     OxyCONTIN 20 MG extended release tablet  Generic drug:  oxyCODONE     OXYGEN     pregabalin 50 MG capsule  Commonly known as:  LYRICA     ranolazine 500 MG extended release tablet  Commonly known as:  RANEXA  TAKE 1 TABLET BY MOUTH TWO TIMES A DAY     simvastatin 20 MG tablet  Commonly known as:  ZOCOR  TAKE 1 TABLET BY MOUTH NIGHTLY     spironolactone 50 MG tablet  Commonly known as:  ALDACTONE  TAKE 2 TABLETS BY MOUTH IN THE MORNING AND ONE TABLET IN THE EVENING     Steglatro 5 MG Tabs  Generic drug:  Ertugliflozin L-PyroglutamicAc  TAKE 1 TABLET BY MOUTH ONE TIME A DAY     sulfaSALAzine 500 MG tablet  Commonly known as:  AZULFIDINE     tiZANidine 2 MG tablet  Commonly known as:  ZANAFLEX  TAKE 1 TABLET BY MOUTH EVERY EIGHT HOURS AS NEEDED FOR PAIN AND SPASM     torsemide 100 MG tablet  Commonly known as:  DEMADEX  TAKE 1 TABLET BY MOUTH IN THE MORNING AND 1/2 TABLET IN THE EVENING     Tresiba FlexTouch 200 UNIT/ML Sopn  Generic drug:  Insulin Degludec  inject 160 units subcutaneously once daily     vitamin D3 25 MCG (1000 UT) Tabs tablet  Commonly known as:  CHOLECALCIFEROL  TAKE 3 TABLETS BY MOUTH ONE TIME A DAY     Vraylar 1.5 MG capsule  Generic drug:  cariprazine hcl        STOP taking these medications    albuterol (2.5 MG/3ML) 0.083% nebulizer solution  Commonly known as:  PROVENTIL     albuterol sulfate  (90 Base) MCG/ACT inhaler     butalbital-acetaminophen-caffeine -40 MG per tablet  Commonly known as:  FIORICET, ESGIC     Calcium 600+D 600-200 MG-UNIT Tabs  Generic drug:  calcium carbonate-vitamin D     glipiZIDE 10 MG extended release tablet  Commonly known as:  GLUCOTROL XL     Glucagon 1 MG/0.2ML Sosy     senna-docusate 8.6-50 MG per tablet  Commonly known as: Stool Softener/Laxative            Discharge recommendations given to patient. Follow Up. in 1 week   Disposition. Home with home care   Activity. As tolerated   Diet: DIET LOW SODIUM 2 GM; Carb Control: 4 carb choices (60 gms)/meal; 1800 ml      Spent > 30 minutes in discharge process.     Signed:  HERMES Haji CNP     10/31/2020 1:46 PM

## 2020-10-31 NOTE — PLAN OF CARE
Problem: Falls - Risk of:  Goal: Will remain free from falls  Description: Will remain free from falls  10/31/2020 1018 by Greg Jung RN  Outcome: Ongoing  Note: Pt assessed for fall. Pt is a high fall risk. Fall precautions in place. Bed alarm is active. Bed locked in lowest position and 2/4 rails up. Nonskid socks on. Call light and personal belongings within reach. Instructed pt to call out for needs. Pt verbalized understanding. Will continue to monitor. Problem: Pain:  Goal: Pain level will decrease  Description: Pain level will decrease  10/31/2020 1021 by Greg Jung RN  Outcome: Ongoing  Note: Pt assessed for pain. Pt educated on pain rating scale. Pt c/o 4/10 pain earlier in this shift. See MAR for pain interventions. Pt c/o 0/10 pain upon reassessment. Pt educated to call RN if pain arise. Pt verbalized understanding. Stimuli reduced. Rest promoted. Call light within reach. Will continue to monitor.

## 2020-11-01 LAB
BLOOD CULTURE, ROUTINE: NORMAL
CULTURE, BLOOD 2: ABNORMAL
CULTURE, BLOOD 2: ABNORMAL
ORGANISM: ABNORMAL
ORGANISM: ABNORMAL

## 2020-11-01 NOTE — ED NOTES
[facility]   Emergency Department Culture Follow-Up       Edgardo Ambrose (CSN: [id number]) was seen and evaluated at [facility] Emergency Department on 10/28/20 by provider  Beto DOMINGUEZ. A blood culture was positive and is growing 1/2 Coagulase negative staph. Sensitivity results: N/A      Treatment Course: The patient was admitted to the hospital and received IV antibiotics for 2 days. Recommendation:    The patient was reviewed with Dr Sean Engle, who felt the result likely represented a blood contaminant. No change in therapy needed at this time. This recommendation was reviewed with and agreed by ED provider Dr. Sean Engle. Follow-Up:    No additional follow-up necessary at this time.      Thank you,    Oxana Hoyt, PharmD  ED Pharmacist [id number]  11/1/2020

## 2020-11-02 ENCOUNTER — CARE COORDINATION (OUTPATIENT)
Dept: CASE MANAGEMENT | Age: 50
End: 2020-11-02

## 2020-11-02 ENCOUNTER — TELEPHONE (OUTPATIENT)
Dept: PULMONOLOGY | Age: 50
End: 2020-11-02

## 2020-11-02 NOTE — TELEPHONE ENCOUNTER
Pt's  called in requesting a call back from [place] in regards to Pt's recent hospital visit.      Pt # [phone]

## 2020-11-02 NOTE — CARE COORDINATION
Patient's  called CTN and left VM. CTN attempted 2nd outreach today, no answer, left VM with contact information and request for return call. CTN will remain available.

## 2020-11-02 NOTE — TELEPHONE ENCOUNTER
The pt's spouse stated that she was in the hospital for Increased SOB and would like to be able to get her sleep study before seeing Dr Jaycob Alarcon - the pt will see Dr Jaycob Alarcon on 11/16/20 for her HFU / Sleep results - I will keep the original appt on 12/3/20 on file

## 2020-11-02 NOTE — CARE COORDINATION
[facility] Transitions Initial Follow Up Call:  Patient's  reports that patient has some chest heaviness and is SOB, this has not changed since hospitalization. She is on 2 Lpm O2 and O2 SATS are 94-95%. If she removes O2, her SATS drop to the high 80's. CTN explained that she needs to continually wear the oxygen. CTN encouraged  to contact [address] [place] [address]'s office, pulmonology and request follow up appointment, he verbalized that he would do so. CTN will also route message. CTN explained that if patient becomes increasingly SOB and/or has any trouble breathing that he should call  immediately, he verbalized understanding. CTN reviewed discharge instructions and medications, 1111F completed. CTN inquired about breathing treatments and  reports that patient is taking them as prescribed. CTN contacted \"Tianna\" with [facility] and confirmed that orders for [address] have been received. CTN will continue with outreach follow up calls. Call within 2 business days of discharge: Yes    Patient: Scott Garcia Patient : 1970   MRN: [id number]  Reason for Admission: Respiratory failure, COVID NEGATIVE  Discharge Date: 10/31/20 RARS: Readmission Risk Score: 37      Last Discharge [facility]       Complaint Diagnosis Description Type Department Provider    10/28/20 Shortness of Breath Suspected COVID-19 virus infection . .. ED to Hosp-Admission (Discharged) (ADMITTED) FZ 3A Niya Hurt MD; Peter Art. .. Spoke with:  Janel      Challenges to be reviewed by the provider   Additional needs identified to be addressed with provider Yes, patient needs follow up.  none    Discussed COVID-19 related testing which was available at this time. Test results were negative. Patient informed of results, if available?  Yes         Method of communication with provider : staff message    Advance Care Planning:   Does patient have an Advance Directive:  reviewed and current. Was this a readmission? No  Patient stated reason for admission: Respiratory failure  Patients top risk factors for readmission: medical condition and Language barrier    Care Transition Nurse (CTN) contacted the family by telephone to perform post hospital discharge assessment. Verified name and  with family as identifiers. Provided introduction to self, and explanation of the CTN role. CTN reviewed discharge instructions, medical action plan and red flags with family who verbalized understanding. Family given an opportunity to ask questions and does not have any further questions or concerns at this time. Were discharge instructions available to patient? Yes. Reviewed appropriate site of care based on symptoms and resources available to patient including: PCP, Urgent care clinics, Home health and When to call 911. The family agrees to contact the PCP office for questions related to their healthcare. Medication reconciliation was performed with family, who verbalizes understanding of administration of home medications. Advised obtaining a 90-day supply of all daily and as-needed medications. Covid Risk Education    Patient has following risk factors of: heart failure, asthma, pneumonia, acute respiratory failure and diabetes. Education provided regarding infection prevention, and signs and symptoms of COVID-19 and when to seek medical attention with family who verbalized understanding. Discussed exposure protocols and quarantine From CDC: Are you at higher risk for severe illness?   and given an opportunity for questions and concerns. The family agrees to contact the COVID-19 hotline [phone] or PCP office for questions related to COVID-19. For more information on steps you can take to protect yourself, see CDC's How to Protect Yourself     Patient/family/caregiver given information for Tomer Serna and agrees to enroll no      Discussed follow-up appointments.  If no appointment was previously scheduled, appointment scheduling offered: Yes. Is follow up appointment scheduled within 7 days of discharge? No      Plan for follow-up call in 5-7 days based on severity of symptoms and risk factors. Plan for next call: symptom management-., self management-. and follow up appointment-. CTN provided contact information for future needs.           Non-face-to-face services provided:  Obtained and reviewed discharge summary and/or continuity of care documents    Care Transitions 24 Hour Call    Do you have any ongoing symptoms?:  No  Do you have a copy of your discharge instructions?:  Yes  Do you have all of your prescriptions and are they filled?:  Yes  Have you been contacted by a [address]?:  No  Have you scheduled your follow up appointment?:  No  Were you discharged with any Home Care or Post Acute Services:  Yes  Post Acute Services:  [address] [place] [address] you feel like you have everything you need to keep you well at home?:  Yes  Care Transitions Interventions         Follow Up  Future Appointments   Date Time Provider Leda Richter   11/2/2020 11:50 AM SCHEDULE, [facility] FLU CLINIC [facility] FLU [facility]   11/9/2020  8:00 PM [facility] SLEEP LAB ROOM [address] [facility]   12/3/2020  2:00 PM MD [address] [facility]       Brandyn Beltran, KARY

## 2020-11-03 ENCOUNTER — TELEPHONE (OUTPATIENT)
Dept: INTERNAL MEDICINE CLINIC | Age: 50
End: 2020-11-03

## 2020-11-03 NOTE — TELEPHONE ENCOUNTER
Rama from [address] is calling ---pt is out of the hospital and they will be resuming pt care. Thanks.

## 2020-11-04 ENCOUNTER — HOSPITAL ENCOUNTER (OUTPATIENT)
Dept: ULTRASOUND IMAGING | Age: 50
Discharge: HOME | End: 2020-11-04
Payer: COMMERCIAL

## 2020-11-04 PROCEDURE — 76705 ECHO EXAM OF ABDOMEN: CPT

## 2020-11-04 NOTE — TELEPHONE ENCOUNTER
Left detailed message on Dony chahal VM that   [facility] would sign orders for the pt and gave a verbal for the pt.

## 2020-11-05 ENCOUNTER — OFFICE VISIT (OUTPATIENT)
Dept: PRIMARY CARE CLINIC | Age: 50
End: 2020-11-05
Payer: COMMERCIAL

## 2020-11-05 PROCEDURE — 99211 OFF/OP EST MAY X REQ PHY/QHP: CPT | Performed by: NURSE PRACTITIONER

## 2020-11-05 NOTE — PATIENT INSTRUCTIONS

## 2020-11-05 NOTE — PROGRESS NOTES
[address] [place] [address] received a viral test for COVID-19. They were educated on isolation and quarantine as appropriate. For any symptoms, they were directed to seek care from their PCP, given contact information to establish with a doctor, directed to an urgent care or the emergency room.

## 2020-11-06 ENCOUNTER — TELEPHONE (OUTPATIENT)
Dept: INTERNAL MEDICINE CLINIC | Age: 50
End: 2020-11-06

## 2020-11-06 LAB — SARS-COV-2: NOT DETECTED

## 2020-11-06 NOTE — TELEPHONE ENCOUNTER
Zan Vora from [facility] called. She is wondering if  will continue to sign home care orders for this pt. Please call and advise.

## 2020-11-09 ENCOUNTER — HOSPITAL ENCOUNTER (OUTPATIENT)
Dept: SLEEP CENTER | Age: 50
Discharge: HOME | End: 2020-11-09
Payer: COMMERCIAL

## 2020-11-09 ENCOUNTER — CARE COORDINATION (OUTPATIENT)
Dept: CASE MANAGEMENT | Age: 50
End: 2020-11-09

## 2020-11-09 PROCEDURE — 95810 POLYSOM 6/> YRS 4/> PARAM: CPT

## 2020-11-09 PROCEDURE — 95810 POLYSOM 6/> YRS 4/> PARAM: CPT | Performed by: INTERNAL MEDICINE

## 2020-11-09 NOTE — CARE COORDINATION
Patient resolved from the Care Transitions episode on 11/9/20  Discussed COVID-19 related testing which was available at this time. Test results were negative. Patient informed of results, if available? yes    Patient/family has been provided the following resources and education related to COVID-19:                         Signs, symptoms and red flags related to COVID-19            CDC exposure and quarantine guidelines            [employer] exposure contact - [phone]            Contact for their local Department of Health                 Patient currently reports that the following symptoms have improved:  shortness of breath and no new/worsening symptoms     No further outreach scheduled with this CTN/ACM. Episode of Care resolved. Patient has this CTN/ACM contact information if future needs arise.

## 2020-11-10 ENCOUNTER — HOSPITAL ENCOUNTER (OUTPATIENT)
Age: 50
Setting detail: SPECIMEN
Discharge: HOME | End: 2020-11-10
Payer: COMMERCIAL

## 2020-11-10 LAB
ANION GAP SERPL CALCULATED.3IONS-SCNC: 12 MMOL/L (ref 3–16)
BUN BLDV-MCNC: 44 MG/DL (ref 7–20)
CALCIUM SERPL-MCNC: 9.7 MG/DL (ref 8.3–10.6)
CHLORIDE BLD-SCNC: 94 MMOL/L (ref 99–110)
CO2: 28 MMOL/L (ref 21–32)
CREAT SERPL-MCNC: 1.4 MG/DL (ref 0.6–1.1)
GFR AFRICAN AMERICAN: 48
GFR NON-AFRICAN AMERICAN: 40
GLUCOSE BLD-MCNC: 83 MG/DL (ref 70–99)
POTASSIUM SERPL-SCNC: 4.4 MMOL/L (ref 3.5–5.1)
PRO-BNP: 325 PG/ML (ref 0–124)
SODIUM BLD-SCNC: 134 MMOL/L (ref 136–145)
TOTAL CK: 68 U/L (ref 26–192)
URIC ACID, SERUM: 4.4 MG/DL (ref 2.6–6)

## 2020-11-10 PROCEDURE — 82550 ASSAY OF CK (CPK): CPT

## 2020-11-10 PROCEDURE — 36415 COLL VENOUS BLD VENIPUNCTURE: CPT

## 2020-11-10 PROCEDURE — 83880 ASSAY OF NATRIURETIC PEPTIDE: CPT

## 2020-11-10 PROCEDURE — 80048 BASIC METABOLIC PNL TOTAL CA: CPT

## 2020-11-10 PROCEDURE — 84550 ASSAY OF BLOOD/URIC ACID: CPT

## 2020-11-16 ENCOUNTER — VIRTUAL VISIT (OUTPATIENT)
Dept: PULMONOLOGY | Age: 50
End: 2020-11-16
Payer: COMMERCIAL

## 2020-11-16 PROCEDURE — 99213 OFFICE O/P EST LOW 20 MIN: CPT | Performed by: INTERNAL MEDICINE

## 2020-11-16 RX ORDER — TRAZODONE HYDROCHLORIDE 50 MG/1
50 TABLET ORAL NIGHTLY
Qty: 1 TABLET | Refills: 0 | Status: SHIPPED | OUTPATIENT
Start: 2020-11-16

## 2020-11-16 NOTE — PROGRESS NOTES
2020    TELEHEALTH EVALUATION -- Audio/Visual (During XRYMV-44 public health emergency)    HPI:  Ibrahima Barrera (:  1970) has requested an video evaluation for the following concern(s):  45y.o. year old female is here for follow-up of asthma, chronic hypoxic respiratory failure on home oxygen at 2 L/min, heart failure with preserved ejection fraction, obstructive sleep apnea not on CPAP. she has PMH of history of breast cancer, depression, hypothyroidism, diabetes mellitus type 2. Patient was hospitalized in 2020 for shortness of breath as well as fluid overload. She was noted to be COVID-19. Patient was diuresed and then discharged home. Today she states that her shortness of breath is at baseline. She denies any cough or wheezing or chest pain or hemoptysis. She continues to use oxygen at 2 L/min. She is trying to keep her weight around 180 pounds. Continues to take torsemide 100 mg in the morning and 50 mg in the evening. Her bronchodilator regimen for asthma consist of Dulera 2 puffs twice daily along with albuterol inhaler albuterol nebs as needed. She also takes Singulair 10 mg daily.     Patient was diagnosed with obstructive sleep apnea many years ago. She had 2 sleep studies, 110 years ago and 1 close to 5 years ago. Her last sleep study was performed at [facility]. Her  states that she had two CPAP machines at home. She could not get used to the CPAP and stopped wearing them. She only wears oxygen to sleep at night. She underwent repeat baseline sleep study on 2020 that showed mild obstructive sleep apnea with AHI 5.8. However, patient did not sleep very well during the sleep study and did not get enough REM sleep. She was saturating less than 90% 404 minutes.   Because of patient's cardiovascular risk factors, CPAP titration study was recommended.     -Patient was hospitalized in 2024 pneumonia and fluid overload.     Diagnostic test reviewed:  I reviewed the chest x-ray from 3/27/2020 and my interpretation is as follows. Increased pulmonary vascular congestion.     I reviewed the PFT from 5/10/2018 and my interpretation is as follows. restrictive lung disease with reduced diffusion capacity.     Echocardiogram from 9/20/2020 showed preserved EF of 55 to 60%    Sleep study from 11/9/2020 showed AHI of 5.8/h with saturation of 90% 404 minutes.         Review of Systems   CONSTITUTIONAL SYMPTOMS: The patient denies fever, fatigue, night sweats, weight loss or weight gain. HEENT: No vision changes. No tinnitus, Denies sinus pain. No hoarseness, or dysphagia. NECK: Patient denies swelling in the neck. CARDIOVASCULAR: Denies chest pain, palpitation, syncope. RESPIRATORY: See above. GASTROINTESTINAL: Denies nausea, abdominal pain or change in bowel function. GENITOURINARY: Denies obstructive symptoms. No history of incontinence. BREASTS: No masses or lumps in the breasts. SKIN: No rashes or itching. MUSCULOSKELETAL: Denies weakness or bone pain. NEUROLOGICAL: No headaches or seizures. PSYCHIATRIC: Denies mood swings or depression. ENDOCRINE: Denies heat or cold intolerance or excessive thirst.  HEMATOLOGIC/LYMPHATIC: Denies easy bruising or lymph node swelling. ALLERGIC/IMMUNOLOGIC: No environmental allergies.       Prior to Visit Medications    Medication Sig Taking?  Authorizing Provider   traZODone (DESYREL) 50 MG tablet Take 1 tablet by mouth nightly Take it on the day of sleep study Yes Gianni Johnson MD   ferrous sulfate (IRON 325) 325 (65 Fe) MG tablet TAKE 1 TABLET BY MOUTH TWO TIMES A DAY WITH MEALS  Mariana Simental DO   simvastatin (ZOCOR) 20 MG tablet TAKE 1 TABLET BY MOUTH NIGHTLY   Fran Morse MD   metoprolol tartrate (LOPRESSOR) 25 MG tablet TAKE 1 TABLET BY MOUTH TWO TIMES A DAY   Fran Morse MD   Ertugliflozin L-PyroglutamicAc (STEGLATRO) 5 MG TABS TAKE 1 TABLET BY MOUTH ONE TIME A DAY  Samir Arce MD   spironolactone (ALDACTONE) 50 MG tablet TAKE 2 TABLETS BY MOUTH IN THE MORNING AND ONE TABLET IN THE EVENING  Aura Vo MD   omeprazole (PRILOSEC) 20 MG delayed release capsule TAKE 1 CAPSULE BY MOUTH TWO TIMES A DAY   Mariana Simental DO   loratadine (CLARITIN) 10 MG tablet TAKE 1 TABLET BY MOUTH ONE TIME A DAY   Mariana Simental DO   tiZANidine (ZANAFLEX) 2 MG tablet TAKE 1 TABLET BY MOUTH EVERY EIGHT HOURS AS NEEDED FOR PAIN AND SPASM  Ghazala Hurtado DO   nystatin (MYCOSTATIN) 600952 UNIT/ML suspension Take 5 mLs by mouth 4 times daily  Ghazala Hurtado DO   Magic Mouthwash (MIRACLE MOUTHWASH) Swish and spit 5 mLs 4 times daily as needed for Irritation or Pain  Ghazala Hurtado DO   B-D UF III MINI PEN NEEDLES 31G X 5 MM MISC use five times daily with insulin  Richard Gonzalez MD   montelukast (SINGULAIR) 10 MG tablet TAKE 1 TABLET BY MOUTH ONE TIME A DAY   Flaco Walker MD   lidocaine viscous hcl (XYLOCAINE) 2 % SOLN solution Take 5 mLs by mouth every 3 hours as needed for Irritation or Pain  Ghazala Hurtado DO   ketoconazole (NIZORAL) 2 % shampoo Apply topically daily as needed. Ghazala Hurtado DO   Insulin Degludec (TRESIBA FLEXTOUCH) 200 UNIT/ML SOPN inject 160 units subcutaneously once daily  Richard Gonzalez MD   NOVOLOG FLEXPEN 100 UNIT/ML injection pen INJECT 30 TO 50 UNITS INTO THE SKIN THREE TIMES DAILY BEFORE MEALS  Richard Gonzalez MD   metOLazone (ZAROXOLYN) 2.5 MG tablet TAKE 1 TABLET BY MOUTH TWO TIMES A WEEK AS NEEDED for weight over 180 pounds. do not take two days in a row  Aura Vo MD   blood glucose test strips (FREESTYLE LITE) strip test blood sugar four times daily  Richard Gonzalez MD   nitroGLYCERIN (NITROSTAT) 0.4 MG SL tablet Place 1 tablet under the tongue every 5 minutes as needed for Chest pain up to max of 3 total doses. If no relief after 1 dose, call 911.   Aura Vo MD   vitamin D3 (CHOLECALCIFEROL) 25 MCG (1000 UT) TABS tablet TAKE 3 TABLETS BY [address] [place] A DAY  Jennifer Mejía MD   levothyroxine (SYNTHROID) 150 MCG tablet Take 1 tablet by mouth every morning (before breakfast)  Jennifer Mejía MD   diazePAM (VALIUM) 5 MG tablet Take 5 mg by mouth 2 times daily as needed for Anxiety. Historical Provider, MD   cariprazine hcl (VRAYLAR) 1.5 MG capsule Take 1.5 mg by mouth daily  Historical Provider, MD   Febuxostat 80 MG TABS Take 80 mg by mouth daily  Historical Provider, MD   pregabalin (LYRICA) 50 MG capsule Take 50 mg by mouth 2 times daily. Historical Provider, MD   lubiprostone (AMITIZA) 24 MCG capsule Take 24 mcg by mouth daily (with breakfast)  Historical Provider, MD   torsemide (DEMADEX) 100 MG tablet TAKE 1 TABLET BY MOUTH IN THE MORNING AND 1/2 TABLET IN THE EVENING   Alex Kapoor MD   ranolazine (RANEXA) 500 MG extended release tablet TAKE 1 TABLET BY MOUTH TWO TIMES A DAY   Alex Kapoor MD   leflunomide (ARAVA) 10 MG tablet Take 10 mg by mouth daily   Historical Provider, MD   OXYGEN Inhale 3 L into the lungs nightly Sometimes with activity  Historical Provider, MD   oxyCODONE (OXYCONTIN) 20 MG extended release tablet Take 20 mg by mouth every 12 hours. Historical Provider, MD   aspirin 81 MG EC tablet Take 81 mg by mouth daily  Historical Provider, MD   sulfaSALAzine (AZULFIDINE) 500 MG tablet Take 500 mg by mouth 3 times daily   Historical Provider, MD   benztropine (COGENTIN) 1 MG tablet Take 1 mg by mouth nightly   Historical Provider, MD   folic acid (FOLVITE) 1 MG tablet Take 1 mg by mouth daily  Historical Provider, MD   oxyCODONE-acetaminophen (PERCOCET)  MG per tablet Take 1 tablet by mouth every 4 hours as needed for Pain . Earliest Fill Date: 6/8/17  HERMES Mckeon - CNP   duloxetine (CYMBALTA) 60 MG capsule Take 60 mg by mouth 2 times daily. Historical Provider, MD       Social History     Tobacco Use    Smoking status: Never Smoker    Smokeless tobacco: Never Used   Substance Use Topics    Alcohol use:  No  Drug use: No        Past Medical History:   Diagnosis Date    Anxiety     Anxiety and depression     Arthritis     not sure of specific type    Asthma     CAD (coronary artery disease)     Cancer ([facility]     left breast    Cerebral artery occlusion with cerebral infarction ([facility]     ,     CHF (congestive heart failure) ([facility] 2018    Chronic kidney disease     renal insufficency/to see Dr Jyoti Sanders    Chronic pain     Constipation     COPD (chronic obstructive pulmonary disease) ([facility]     Depression     Diabetes mellitus ([facility]     Diabetic polyneuropathy associated with type 2 diabetes mellitus ([facility] 2018    Dysthymia 2018    ESBL (extended spectrum beta-lactamase) producing bacteria infection 2018    urine    Fibromyalgia     Gastric ulcer, unspecified as acute or chronic, without mention of hemorrhage, perforation, or obstruction     GERD (gastroesophageal reflux disease)     Gout     HIGH CHOLESTEROL     Hypertension     Hypothyroidism     Severe persistent asthma without complication 3173    Thyroid disease     TIA (transient ischemic attack)     with occasional left leg and hand weakness   ,   Past Surgical History:   Procedure Laterality Date    BREAST SURGERY      left mastectomy    CARPAL TUNNEL RELEASE      Bilateral    FINGER CONTRACTURE SURGERY      HYSTERECTOMY  2005    USO    TONSILLECTOMY      UPPER GASTROINTESTINAL ENDOSCOPY  2019    stretched esophagous    ,   Social History     Tobacco Use    Smoking status: Never Smoker    Smokeless tobacco: Never Used   Substance Use Topics    Alcohol use: No    Drug use: No   ,   Family History   Problem Relation Age of Onset    Asthma Other     Cancer Other     Depression Other     Diabetes Other     Hypertension Other     High Cholesterol Other     Migraines Other     Heart Attack Father 72         of MI    High Blood Pressure Mother     Diabetes Mother        PHYSICAL EXAMINATION:    Vital Signs: Not obtained    Constitutional:  [x] No apparent distress        Mental status  [x] Alert and awake  [] Oriented to person/place/time [x]Able to follow commands      Eyes:  Sclera  [x]  Normal  [] Abnormal -         Discharge [x]  None visible  [] Abnormal -    HENT:   [x] Normocephalic, atraumatic. Pulmonary/Chest: [x] Respiratory effort normal.  [x] No visualized signs of difficulty breathing or respiratory distress        Neurological:        [x] No Facial Asymmetry (Cranial nerve 7 motor function) (limited exam to video visit)           Skin:        [x] No significant exanthematous lesions or discoloration noted on facial skin             Psychiatric:       [x] Normal Affect      ASSESSMENT/PLAN:    1. LUIS (obstructive sleep apnea)  Patient was diagnosed with obstructive sleep apnea many years ago. However, she could not get used to CPAP and stopped using it. She is only wearing oxygen to sleep at night. I explained to her about the direct correlation of uncontrolled heart failure with repeated exacerbations and uncontrolled sleep apnea. I explained to her the importance of controlling sleep apnea with CPAP machine. Patient is willing to try CPAP again. She underwent a repeat sleep study on 11/9/2020 that showed mild AHI of 5.8/h. Patient was saturating less than 90% 404 minutes. Unfortunately, patient did not have a adequate REM sleep. AHI was underestimated. Because of patient's cardiovascular risk factors including heart failure as well as obstructive sleep apnea symptoms including excessive daytime sleepiness and fatigue, CPAP titration study is recommended. CPAP titration study is ordered in epic. I have advised the patient to take 1 tablet of trazodone 50 mg before CPAP titration study. - traZODone (DESYREL) 50 MG tablet; Take 1 tablet by mouth nightly Take it on the day of sleep study  Dispense: 1 tablet; Refill: 0    2.  Chronic respiratory failure with hypoxia ([facility])  Likely secondary to heart failure and uncontrolled sleep apnea. There might be some component of obesity hypoventilation syndrome. Continue oxygen at 2 L/min. 3. Chronic heart failure with preserved ejection fraction [facility])  Managed by cardiology. Continue torsemide. 4. Mild persistent asthma without complication  Continue Dulera 2 puffs twice daily. Continue albuterol as needed. Continue Singulair 10 mg daily.     Follow up in 8 weeks    Iron Contreras is a 45 y.o. female being evaluated by a Virtual Visit (video visit) encounter to address concerns as mentioned above. A caregiver was present when appropriate. Due to this being a TeleHealth encounter (During [facility] public health emergency), evaluation of the following organ systems was limited: Vitals/Constitutional/EENT/Resp/CV/GI//MS/Neuro/Skin/Heme-Lymph-Imm. Pursuant to the emergency declaration under the [address] [place] [address] and the [employer] Act, this Virtual Visit was conducted with patient's (and/or legal guardian's) consent, to reduce the patient's risk of exposure to COVID-19 and provide necessary medical care. The patient (and/or legal guardian) has also been advised to contact this office for worsening conditions or problems, and seek emergency medical treatment and/or call 911 if deemed necessary. Patient identification was verified at the start of the visit: Yes    Total time spent on this encounter: Not billed by time    Services were provided through a video synchronous discussion virtually to substitute for in-person clinic visit. Patient and provider were located at their individual homes. --Marion Vickers MD on 11/16/2020 at 3:01 PM    An electronic signature was used to authenticate this note.

## 2020-11-17 ENCOUNTER — HOSPITAL ENCOUNTER (OUTPATIENT)
Age: 50
Setting detail: SPECIMEN
Discharge: HOME | End: 2020-11-17
Payer: COMMERCIAL

## 2020-11-17 ENCOUNTER — TELEPHONE (OUTPATIENT)
Dept: CARDIOLOGY CLINIC | Age: 50
End: 2020-11-17

## 2020-11-17 LAB
ANION GAP SERPL CALCULATED.3IONS-SCNC: 12 MMOL/L (ref 3–16)
BUN BLDV-MCNC: 38 MG/DL (ref 7–20)
CALCIUM SERPL-MCNC: 9.7 MG/DL (ref 8.3–10.6)
CHLORIDE BLD-SCNC: 99 MMOL/L (ref 99–110)
CO2: 28 MMOL/L (ref 21–32)
CREAT SERPL-MCNC: 1.1 MG/DL (ref 0.6–1.1)
GFR AFRICAN AMERICAN: >60
GFR NON-AFRICAN AMERICAN: 52
GLUCOSE BLD-MCNC: 121 MG/DL (ref 70–99)
POTASSIUM SERPL-SCNC: 4.3 MMOL/L (ref 3.5–5.1)
PRO-BNP: 253 PG/ML (ref 0–124)
SODIUM BLD-SCNC: 139 MMOL/L (ref 136–145)

## 2020-11-17 PROCEDURE — 83880 ASSAY OF NATRIURETIC PEPTIDE: CPT

## 2020-11-17 PROCEDURE — 80048 BASIC METABOLIC PNL TOTAL CA: CPT

## 2020-11-17 PROCEDURE — 36415 COLL VENOUS BLD VENIPUNCTURE: CPT

## 2020-11-17 NOTE — TELEPHONE ENCOUNTER
----- Message from Derrick Hope MD sent at 11/16/2020  5:15 PM EST -----  Call patient. Labs are okay. No changes.   ARETHA

## 2020-11-18 ENCOUNTER — TELEPHONE (OUTPATIENT)
Dept: INTERNAL MEDICINE CLINIC | Age: 50
End: 2020-11-18

## 2020-11-18 ENCOUNTER — VIRTUAL VISIT (OUTPATIENT)
Dept: INTERNAL MEDICINE CLINIC | Age: 50
End: 2020-11-18
Payer: COMMERCIAL

## 2020-11-18 PROBLEM — K83.8 INTRAHEPATIC BILE DUCT DILATION: Status: ACTIVE | Noted: 2020-11-18

## 2020-11-18 PROCEDURE — 99214 OFFICE O/P EST MOD 30 MIN: CPT | Performed by: INTERNAL MEDICINE

## 2020-11-18 PROCEDURE — G8417 CALC BMI ABV UP PARAM F/U: HCPCS | Performed by: INTERNAL MEDICINE

## 2020-11-18 PROCEDURE — G8484 FLU IMMUNIZE NO ADMIN: HCPCS | Performed by: INTERNAL MEDICINE

## 2020-11-18 PROCEDURE — 1036F TOBACCO NON-USER: CPT | Performed by: INTERNAL MEDICINE

## 2020-11-18 PROCEDURE — 1111F DSCHRG MED/CURRENT MED MERGE: CPT | Performed by: INTERNAL MEDICINE

## 2020-11-18 PROCEDURE — G8428 CUR MEDS NOT DOCUMENT: HCPCS | Performed by: INTERNAL MEDICINE

## 2020-11-18 PROCEDURE — G9899 SCRN MAM PERF RSLTS DOC: HCPCS | Performed by: INTERNAL MEDICINE

## 2020-11-18 PROCEDURE — 3017F COLORECTAL CA SCREEN DOC REV: CPT | Performed by: INTERNAL MEDICINE

## 2020-11-18 RX ORDER — BUTALBITAL, ACETAMINOPHEN AND CAFFEINE 50; 325; 40 MG/1; MG/1; MG/1
1 TABLET ORAL EVERY 6 HOURS PRN
Qty: 30 TABLET | Refills: 1 | Status: SHIPPED | OUTPATIENT
Start: 2020-11-18 | End: 2021-11-18

## 2020-11-18 ASSESSMENT — ENCOUNTER SYMPTOMS
TROUBLE SWALLOWING: 0
PHOTOPHOBIA: 0
SHORTNESS OF BREATH: 1

## 2020-11-18 NOTE — PROGRESS NOTES
Post-Discharge Transitional Care Management Services or Hospital Follow Up      [address] [facility]   YOB: 1970    Date of Office Visit:  11/18/2020  Date of Hospital Admission: 10/28/20  Date of Hospital Discharge: 10/31/20  Readmission Risk Score(high >=14%.  Medium >=10%):Readmission Risk Score: 37      Care management risk score Rising risk (score 2-5) and Complex Care (Scores >=6): 11     Non face to face  following discharge, date last encounter closed (first attempt may have been earlier): *No documented post hospital discharge outreach found in the last 14 days *No documented post hospital discharge outreach found in the last 14 days    Call initiated 2 business days of discharge: *No response recorded in the last 14 days     Patient Active Problem List   Diagnosis    Fibromyalgia    Iron deficiency anemia    Malignant neoplasm of overlapping sites of left female breast ([facility]    Chronic pain    Lymphedema of upper extremity following lymphadenectomy    Encounter for monitoring aromatase inhibitor therapy    Encounter for follow-up surveillance of breast cancer    Hyperlipidemia LDL goal <70    Essential hypertension    Poorly controlled type 2 diabetes mellitus ([address]    Asthma    Hypothyroidism    Anxiety and depression    Hx of breast cancer    Hypoxia    Hypervolemia    SOB (shortness of breath)    Hiatal hernia with GERD    LUIS (obstructive sleep apnea)    Coronary artery disease involving native coronary artery of native heart without angina pectoris    Severe persistent asthma without complication    Diabetic polyneuropathy associated with type 2 diabetes mellitus ([address]    Chronic congestive heart failure ([address]    Chronic heart failure with preserved ejection fraction ([facility]    Coronary artery disease due to lipid rich plaque    Renal insufficiency    RA (rheumatoid arthritis) ([facility]    Pyelonephritis    History of nephrolithiasis    Immigrant with language difficulty    Morbidly obese ([facility]    H/O TIA (transient ischemic attack) and stroke    Need for isolation    Encounter for medication counseling    Hesitancy of micturition    Weight loss counseling, encounter for    Complex care coordination    Oropharyngeal dysphagia    Constipation due to pain medication    Right hand pain    Fungal dermatitis    Mouth pain    Environmental and seasonal allergies    Hypersomnolence    Enlargement of submandibular gland    Jaw pain    Pain in both lower extremities    Vertigo    Fatigue    Edema of right lower extremity    Hot flashes    Chest pain    Excessive sweating    Tongue pain    Pneumonia    Acute respiratory failure with hypoxemia (HCC)    Tension type headache    Hyperglycemia    Inattention    Severe episode of recurrent major depressive disorder (HCC)    Alopecia    Aphthous ulcer of tongue    Seborrhea capitis in adult    Thrush    Scalp cyst    Acute hypoxemic respiratory failure (HCC)    Intrahepatic bile duct dilation       Allergies   Allergen Reactions    Iv Dye [Iodides] Anaphylaxis     allergic to ct scan dye, not the MRI    Diazepam Other (See Comments)    Trazodone And Nefazodone Other (See Comments)     Makes patient feel very sluggish       Medications listed as ordered at the time of discharge from hospital   Crow Bonner   Home Medication Instructions MAINOR:    Printed on:11/27/20 1611   Medication Information                      aspirin 81 MG EC tablet  Take 81 mg by mouth daily             B-D UF III MINI PEN NEEDLES 31G X 5 MM MISC  use five times daily with insulin             benztropine (COGENTIN) 1 MG tablet  Take 1 mg by mouth nightly              blood glucose test strips (FREESTYLE LITE) strip  test blood sugar four times daily             butalbital-acetaminophen-caffeine (FIORICET, ESGIC) -40 MG per tablet  Take 1 tablet by mouth every 6 hours as needed for Headaches             cariprazine (MYCOSTATIN) 815188 UNIT/ML suspension Take 5 mLs by mouth 4 times daily 500 mL 1    [DISCONTINUED] Magic Mouthwash (MIRACLE MOUTHWASH) Swish and spit 5 mLs 4 times daily as needed for Irritation or Pain 300 mL 2        Medications patient taking as of now reconciled against medications ordered at time of hospital discharge: Yes    No chief complaint on file. HPI    Inpatient course: Discharge summary reviewed- see chart. Interval history/Current status:   Was admitted to [facility] 10/28-10/31/2020 with pneumonia (COVID test was negative) and CHF. Had AMS with BS of 500. Is c/o diarrhea the past 2 days. Also states that her weight has been increasing, is up to 188 pounds today from 175 pounds after d/c from hospital. [facility] nurse was going to give a message to Dr. Melonie Winters. Is more SOB yesterday and today. Pulse ox was 88% today.  states that she is supposed to take an extra dose of metazolone. Did take 1 dose yesterday and is down only 2 pounds. Is also c/o HA across her forehead. HA started yesterday and is still hurting her today. Feels like a tight pressure. Hurts when she presses on it as well. Has tried to take Tylenol which has not helped. Butalbital has helped with previous HA but is out of medications. Had MRI of Breast and US of liver and kidneys ordered by Dr. Ashely Erazo and was told to f/u with me for results. MRI of breast was normal but showed dilation bile ducts, confirmed on US. Has seen Dr. David Sheppard for GI in the past.     Is also c/o mouth pain/buring/discomfort again and would like refills of magic mouth wash. Review of Systems   Constitutional: Positive for unexpected weight change. HENT: Negative for trouble swallowing. Eyes: Negative for photophobia and visual disturbance. Respiratory: Positive for shortness of breath. Cardiovascular: Positive for leg swelling. Negative for chest pain. There were no vitals filed for this visit.   There is no height or weight on file to calculate BMI. Wt Readings from Last 3 Encounters:   10/31/20 187 lb (84.8 kg)   10/07/20 187 lb (84.8 kg)   10/01/20 185 lb (83.9 kg)     BP Readings from Last 3 Encounters:   10/31/20 112/70   10/07/20 116/70   10/01/20 122/75       Physical Exam  Constitutional:       Appearance: She is obese. Eyes:      General: No scleral icterus. Conjunctiva/sclera: Conjunctivae normal.      Pupils: Pupils are equal, round, and reactive to light. Pulmonary:      Effort: Pulmonary effort is normal. No respiratory distress. Neurological:      General: No focal deficit present. Mental Status: She is alert. Gait: Gait normal.   Psychiatric:         Attention and Perception: Attention normal.         Mood and Affect: Affect normal. Mood is anxious and depressed. Chronic heart failure with preserved ejection fraction (HCC)  Continues to follow with Dr. Crispin Jason. Did have congestive heart failure during recent admission for pneumonia. Complex care coordination  Patient has multiple complex health issues including diabetes mellitus type 2 followed by Dr. Cara Serna, congestive heart failure followed by Dr. Crispin Jason, rheumatoid arthritis and gout followed by Dr. Beatriz Díaz, breast cancer followed with Dr. Aundrea Cali, asthma followed by Dr. Aryan Krause, chronic constipation and dysphagia followed by Dr. Jud Atkinson,  significant depression and anxiety followed by psychiatry. Tension type headache  Resume butalbital.    Intrahepatic bile duct dilation  Patient will need to follow-up with Dr. Jud Atkinson. Morbidly obese ([address]  Work on diet. Pneumonia  Patient was admitted to [facility] 10/28 through 10/31/2020 with pneumonia. Covid test was negative. She did have an acute mental status change with a blood sugar of 500. Tongue pain  No obvious thrush seen through video visit but will treat empirically with nystatin and add Magic mouthwash to help symptomatically. Dave Sexton Medical Decision Making: moderate complexity

## 2020-11-20 ENCOUNTER — TELEPHONE (OUTPATIENT)
Dept: CARDIOLOGY CLINIC | Age: 50
End: 2020-11-20

## 2020-11-20 NOTE — TELEPHONE ENCOUNTER
Wen Heredia calling from [facility]  to report the 10 pound weight gain in one week. oxygen 93% on room air. Lungs sounded clear.   pls call to advise thank you

## 2020-11-24 ENCOUNTER — HOSPITAL ENCOUNTER (OUTPATIENT)
Age: 50
Setting detail: SPECIMEN
Discharge: HOME | End: 2020-11-24
Payer: COMMERCIAL

## 2020-11-24 LAB
ANION GAP SERPL CALCULATED.3IONS-SCNC: 15 MMOL/L (ref 3–16)
BUN BLDV-MCNC: 49 MG/DL (ref 7–20)
CALCIUM SERPL-MCNC: 9.7 MG/DL (ref 8.3–10.6)
CHLORIDE BLD-SCNC: 91 MMOL/L (ref 99–110)
CO2: 29 MMOL/L (ref 21–32)
CREAT SERPL-MCNC: 1.9 MG/DL (ref 0.6–1.1)
GFR AFRICAN AMERICAN: 34
GFR NON-AFRICAN AMERICAN: 28
GLUCOSE BLD-MCNC: 110 MG/DL (ref 70–99)
POTASSIUM SERPL-SCNC: 4 MMOL/L (ref 3.5–5.1)
PRO-BNP: 367 PG/ML (ref 0–124)
SODIUM BLD-SCNC: 135 MMOL/L (ref 136–145)
URIC ACID, SERUM: 6.2 MG/DL (ref 2.6–6)

## 2020-11-24 PROCEDURE — 80048 BASIC METABOLIC PNL TOTAL CA: CPT

## 2020-11-24 PROCEDURE — 36415 COLL VENOUS BLD VENIPUNCTURE: CPT

## 2020-11-24 PROCEDURE — 84550 ASSAY OF BLOOD/URIC ACID: CPT

## 2020-11-24 PROCEDURE — 83880 ASSAY OF NATRIURETIC PEPTIDE: CPT

## 2020-11-27 NOTE — ASSESSMENT & PLAN NOTE
Continues to follow with Dr. Danial Groves. Did have congestive heart failure during recent admission for pneumonia.

## 2020-11-27 NOTE — ASSESSMENT & PLAN NOTE
Patient has multiple complex health issues including diabetes mellitus type 2 followed by Dr. Felix Steward, congestive heart failure followed by Dr. Vamsi Nation, rheumatoid arthritis and gout followed by Dr. Sukhwinder Chong, breast cancer followed with Dr. Kellie Patel, asthma followed by Dr. Bar Keating, chronic constipation and dysphagia followed by Dr. Yamilet Friedman,  significant depression and anxiety followed by psychiatry.

## 2020-11-27 NOTE — ASSESSMENT & PLAN NOTE
Patient was admitted to [facility] 10/28 through 10/31/2020 with pneumonia. Covid test was negative. She did have an acute mental status change with a blood sugar of 500.

## 2020-11-27 NOTE — ASSESSMENT & PLAN NOTE
No obvious thrush seen through video visit but will treat empirically with nystatin and add Magic mouthwash to help symptomatically. Denise Ruiz

## 2020-12-01 ENCOUNTER — HOSPITAL ENCOUNTER (OUTPATIENT)
Age: 50
Setting detail: SPECIMEN
Discharge: HOME | End: 2020-12-01
Payer: COMMERCIAL

## 2020-12-01 LAB
ANION GAP SERPL CALCULATED.3IONS-SCNC: 9 MMOL/L (ref 3–16)
BUN BLDV-MCNC: 44 MG/DL (ref 7–20)
CALCIUM SERPL-MCNC: 9.3 MG/DL (ref 8.3–10.6)
CHLORIDE BLD-SCNC: 92 MMOL/L (ref 99–110)
CO2: 35 MMOL/L (ref 21–32)
CREAT SERPL-MCNC: 1.4 MG/DL (ref 0.6–1.1)
GFR AFRICAN AMERICAN: 48
GFR NON-AFRICAN AMERICAN: 40
GLUCOSE BLD-MCNC: 118 MG/DL (ref 70–99)
POTASSIUM SERPL-SCNC: 4.2 MMOL/L (ref 3.5–5.1)
PRO-BNP: 204 PG/ML (ref 0–124)
SODIUM BLD-SCNC: 136 MMOL/L (ref 136–145)

## 2020-12-01 PROCEDURE — 83880 ASSAY OF NATRIURETIC PEPTIDE: CPT

## 2020-12-01 PROCEDURE — 80048 BASIC METABOLIC PNL TOTAL CA: CPT

## 2020-12-01 PROCEDURE — 36415 COLL VENOUS BLD VENIPUNCTURE: CPT

## 2020-12-07 ENCOUNTER — OFFICE VISIT (OUTPATIENT)
Dept: PRIMARY CARE CLINIC | Age: 50
End: 2020-12-07
Payer: COMMERCIAL

## 2020-12-07 PROCEDURE — G8417 CALC BMI ABV UP PARAM F/U: HCPCS | Performed by: NURSE PRACTITIONER

## 2020-12-07 PROCEDURE — G8428 CUR MEDS NOT DOCUMENT: HCPCS | Performed by: NURSE PRACTITIONER

## 2020-12-07 PROCEDURE — 99211 OFF/OP EST MAY X REQ PHY/QHP: CPT | Performed by: NURSE PRACTITIONER

## 2020-12-07 NOTE — PATIENT INSTRUCTIONS

## 2020-12-09 ENCOUNTER — TELEPHONE (OUTPATIENT)
Dept: CARDIOLOGY CLINIC | Age: 50
End: 2020-12-09

## 2020-12-09 ENCOUNTER — HOSPITAL ENCOUNTER (OUTPATIENT)
Dept: SLEEP CENTER | Age: 50
Discharge: HOME | End: 2020-12-09
Payer: COMMERCIAL

## 2020-12-09 LAB — SARS-COV-2, NAA: NOT DETECTED

## 2020-12-09 PROCEDURE — 95811 POLYSOM 6/>YRS CPAP 4/> PARM: CPT | Performed by: INTERNAL MEDICINE

## 2020-12-09 PROCEDURE — 95811 POLYSOM 6/>YRS CPAP 4/> PARM: CPT

## 2020-12-09 NOTE — TELEPHONE ENCOUNTER
----- Message from Juju Dietrich MD sent at 12/8/2020 10:48 PM EST -----  Please call patient. Labs look good. No changes.   ARETHA

## 2020-12-16 ENCOUNTER — HOSPITAL ENCOUNTER (OUTPATIENT)
Age: 50
Setting detail: SPECIMEN
Discharge: HOME | End: 2020-12-16
Payer: COMMERCIAL

## 2020-12-16 LAB
ALBUMIN SERPL-MCNC: 4.2 G/DL (ref 3.4–5)
ALP BLD-CCNC: 110 U/L (ref 40–129)
ALT SERPL-CCNC: 10 U/L (ref 10–40)
ANION GAP SERPL CALCULATED.3IONS-SCNC: 11 MMOL/L (ref 3–16)
AST SERPL-CCNC: 14 U/L (ref 15–37)
BASOPHILS ABSOLUTE: 0 K/UL (ref 0–0.2)
BASOPHILS RELATIVE PERCENT: 0.3 %
BILIRUB SERPL-MCNC: <0.2 MG/DL (ref 0–1)
BILIRUBIN DIRECT: <0.2 MG/DL (ref 0–0.3)
BILIRUBIN, INDIRECT: ABNORMAL MG/DL (ref 0–1)
BUN BLDV-MCNC: 43 MG/DL (ref 7–20)
CALCIUM SERPL-MCNC: 9.7 MG/DL (ref 8.3–10.6)
CHLORIDE BLD-SCNC: 95 MMOL/L (ref 99–110)
CO2: 31 MMOL/L (ref 21–32)
CREAT SERPL-MCNC: 1.4 MG/DL (ref 0.6–1.1)
EOSINOPHILS ABSOLUTE: 0.1 K/UL (ref 0–0.6)
EOSINOPHILS RELATIVE PERCENT: 1.5 %
GFR AFRICAN AMERICAN: 48
GFR NON-AFRICAN AMERICAN: 40
GLUCOSE BLD-MCNC: 73 MG/DL (ref 70–99)
HCT VFR BLD CALC: 35 % (ref 36–48)
HEMOGLOBIN: 11.4 G/DL (ref 12–16)
LYMPHOCYTES ABSOLUTE: 2.3 K/UL (ref 1–5.1)
LYMPHOCYTES RELATIVE PERCENT: 29.3 %
MCH RBC QN AUTO: 29.1 PG (ref 26–34)
MCHC RBC AUTO-ENTMCNC: 32.5 G/DL (ref 31–36)
MCV RBC AUTO: 89.4 FL (ref 80–100)
MONOCYTES ABSOLUTE: 0.8 K/UL (ref 0–1.3)
MONOCYTES RELATIVE PERCENT: 10.1 %
NEUTROPHILS ABSOLUTE: 4.6 K/UL (ref 1.7–7.7)
NEUTROPHILS RELATIVE PERCENT: 58.8 %
PDW BLD-RTO: 13.6 % (ref 12.4–15.4)
PLATELET # BLD: 266 K/UL (ref 135–450)
PMV BLD AUTO: 9 FL (ref 5–10.5)
POTASSIUM SERPL-SCNC: 4.1 MMOL/L (ref 3.5–5.1)
PRO-BNP: 299 PG/ML (ref 0–124)
RBC # BLD: 3.92 M/UL (ref 4–5.2)
SEDIMENTATION RATE, ERYTHROCYTE: >130 MM/HR (ref 0–30)
SODIUM BLD-SCNC: 137 MMOL/L (ref 136–145)
TOTAL PROTEIN: 8.2 G/DL (ref 6.4–8.2)
URIC ACID, SERUM: 4.4 MG/DL (ref 2.6–6)
WBC # BLD: 7.8 K/UL (ref 4–11)

## 2020-12-16 PROCEDURE — 86140 C-REACTIVE PROTEIN: CPT

## 2020-12-16 PROCEDURE — 84550 ASSAY OF BLOOD/URIC ACID: CPT

## 2020-12-16 PROCEDURE — 85652 RBC SED RATE AUTOMATED: CPT

## 2020-12-16 PROCEDURE — 80076 HEPATIC FUNCTION PANEL: CPT

## 2020-12-16 PROCEDURE — 85025 COMPLETE CBC W/AUTO DIFF WBC: CPT

## 2020-12-16 PROCEDURE — 80048 BASIC METABOLIC PNL TOTAL CA: CPT

## 2020-12-16 PROCEDURE — 83880 ASSAY OF NATRIURETIC PEPTIDE: CPT

## 2020-12-16 PROCEDURE — 36415 COLL VENOUS BLD VENIPUNCTURE: CPT

## 2020-12-17 LAB — C-REACTIVE PROTEIN: 15 MG/L (ref 0–5.1)

## 2020-12-17 NOTE — PROGRESS NOTES
Lamar García         : 1970        PHONE: [phone] (home)     Diagnosis: [x] LUIS (G47.33) [] CSA (G47.31) [] Apnea (G47.30)   Length of Need: [] 12 Months [x] 99 Months [] Other:    Machine (MARK!): [x] Respironics Dream Station      Auto [] ResMed AirSense     Auto [] Other:     []  CPAP () [x] Bilevel ()   Mode: [] Auto [] Spontaneous    Mode: [] Auto [x] Spontaneous            IPAP: 10 cm H2O. EPAP: 7 cm H2O  Biflex 3     Comfort Settings:   - Ramp Pressure: 4 cmH2O                                        - Ramp time: 15 min                                     -  Flex/EPR - 3 full time                                    - For ResMed Bilevel (TiMax-4 sec   TiMin- 0.2 sec)     Humidifier: [x] Heated ()        [] Water chamber replacement ()/ 1 per 6 months        Mask:   [] Nasal () /1 per 3 months [x] Full Face () /1 per 3 months   [] Patient choice -Size and fit mask [] Patient Choice - Size and fit mask   [] Dispense:  [x] Dispense: Medium Airfit F20.    [] Headgear () / 1 per 3 months [] Headgear () / 1 per 3 months   [] Replacement Nasal Cushion ()/2 per month [] Interface Replacement ()/1 per month   [] Replacement Nasal Pillows ()/2 per month         Tubing: [] Heated ()/1 per 3 months    [x] Standard ()/1 per 3 months [] Other:           Filters: [x] Non-disposable ()/1 per 6 months     [] Ultra-Fine, Disposable ()/2 per month        Miscellaneous: [] Chin Strap ()/ 1 per 6 months [] O2 bleed-in:       LPM   [] Oximetry on CPAP/Bilevel []  Other:          Start Order Date: 20    MEDICAL JUSTIFICATION:  I, the undersigned, certify that the above prescribed supplies are medically necessary for this patients wellbeing. In my opinion, the supplies are both reasonable and necessary in reference to accepted standards of medicalpractice in treatment of this patients condition.     Katya Perez MD      NPI: [id number]       Order Signed Date: 12/17/20    Electronically signed by Bhavin Montesinos MD on 12/17/2020 at 1:04 PM

## 2020-12-18 ENCOUNTER — TELEPHONE (OUTPATIENT)
Dept: PULMONOLOGY | Age: 50
End: 2020-12-18

## 2020-12-21 ENCOUNTER — VIRTUAL VISIT (OUTPATIENT)
Dept: INTERNAL MEDICINE CLINIC | Age: 50
End: 2020-12-21
Payer: COMMERCIAL

## 2020-12-21 ENCOUNTER — TELEPHONE (OUTPATIENT)
Dept: INTERNAL MEDICINE CLINIC | Age: 50
End: 2020-12-21

## 2020-12-21 PROCEDURE — 3017F COLORECTAL CA SCREEN DOC REV: CPT | Performed by: INTERNAL MEDICINE

## 2020-12-21 PROCEDURE — G8484 FLU IMMUNIZE NO ADMIN: HCPCS | Performed by: INTERNAL MEDICINE

## 2020-12-21 PROCEDURE — G8428 CUR MEDS NOT DOCUMENT: HCPCS | Performed by: INTERNAL MEDICINE

## 2020-12-21 PROCEDURE — 1036F TOBACCO NON-USER: CPT | Performed by: INTERNAL MEDICINE

## 2020-12-21 PROCEDURE — G8417 CALC BMI ABV UP PARAM F/U: HCPCS | Performed by: INTERNAL MEDICINE

## 2020-12-21 PROCEDURE — G9899 SCRN MAM PERF RSLTS DOC: HCPCS | Performed by: INTERNAL MEDICINE

## 2020-12-21 PROCEDURE — 99214 OFFICE O/P EST MOD 30 MIN: CPT | Performed by: INTERNAL MEDICINE

## 2020-12-21 RX ORDER — COLCHICINE 0.6 MG/1
0.6 TABLET ORAL DAILY
COMMUNITY
Start: 2020-10-18 | End: 2021-08-10 | Stop reason: SDUPTHER

## 2020-12-21 RX ORDER — AMOXICILLIN 250 MG
2 CAPSULE ORAL 2 TIMES DAILY
Qty: 360 TABLET | Refills: 3 | Status: SHIPPED | OUTPATIENT
Start: 2020-12-21 | End: 2022-01-14

## 2020-12-21 RX ORDER — LUBIPROSTONE 24 UG/1
24 CAPSULE, GELATIN COATED ORAL 2 TIMES DAILY WITH MEALS
Qty: 60 CAPSULE | Refills: 5 | Status: SHIPPED | OUTPATIENT
Start: 2020-12-21 | End: 2021-06-30

## 2020-12-21 RX ORDER — SENNA PLUS 8.6 MG/1
1 TABLET ORAL 2 TIMES DAILY
Qty: 60 TABLET | Refills: 11 | Status: SHIPPED | OUTPATIENT
Start: 2020-12-21 | End: 2021-12-21

## 2020-12-21 NOTE — PROGRESS NOTES
TELEHEALTH EVALUATION -- Audio/Visual (During GVKYK-11 public health emergency)    HPI    VV done today for follow up.  is assisting her with her visit today. Has good days and bad days. Today her weight is up and she cannot breathe well if her weight is up. Legs feel very tired and heavy when she walks. Tongue pain also waxes and wanes. Sometimes it is very painful when she eats and sometimes it is fine. Sometimes it feels like food is getting stuck in her throat. Is still getting HH through [facility] who are coming to visit her tomorrow.  states that she is no longer getting senna. Is apparently not covered. Was taking it but is not able to get it from [facility] and is not able to get it from a different pharmacy per insurance restriction. Is also wondering is she can increase her Amitiza to twice daily. Also cannot control urination. Has loss of control. Will sometimes not make it to the restroom in time to not wet herself. Has been an ongoing problem for about 2 weeks now. Ran out of Senna about 6 months ago. Has felt like her right leg has been weaker since she got home from the hospital and she is wondering if she may have had a slight stroke. Is having a lot of swelling in her right leg. Dr. Checo Velasquez is trying to address this. Strength is better when her weight is under 190 pounds.      Past Medical History:   Diagnosis Date    Anxiety     Anxiety and depression     Arthritis     not sure of specific type    Asthma     CAD (coronary artery disease)     Cancer ([address] 2007    left breast    Cerebral artery occlusion with cerebral infarction ([address]     2000, 2001    CHF (congestive heart failure) ([address] 2018    Chronic kidney disease     renal insufficency/to see Dr Cristiane Liu    Chronic pain     Constipation     COPD (chronic obstructive pulmonary disease) ([address]     Depression     Diabetes mellitus ([address]  butalbital-acetaminophen-caffeine (FIORICET, ESGIC) -40 MG per tablet Take 1 tablet by mouth every 6 hours as needed for Headaches 30 tablet 1    nystatin (MYCOSTATIN) 173793 UNIT/ML suspension Take 5 mLs by mouth 4 times daily 500 mL 1    Magic Mouthwash (MIRACLE MOUTHWASH) Swish and spit 5 mLs 4 times daily as needed for Irritation or Pain 300 mL 2    traZODone (DESYREL) 50 MG tablet Take 1 tablet by mouth nightly Take it on the day of sleep study 1 tablet 0    ferrous sulfate (IRON 325) 325 (65 Fe) MG tablet TAKE 1 TABLET BY MOUTH TWO TIMES A DAY WITH MEALS 180 tablet 0    simvastatin (ZOCOR) 20 MG tablet TAKE 1 TABLET BY MOUTH NIGHTLY  90 tablet 0    metoprolol tartrate (LOPRESSOR) 25 MG tablet TAKE 1 TABLET BY MOUTH TWO TIMES A DAY  180 tablet 0    Ertugliflozin L-PyroglutamicAc (STEGLATRO) 5 MG TABS TAKE 1 TABLET BY MOUTH ONE TIME A DAY 30 tablet 2    spironolactone (ALDACTONE) 50 MG tablet TAKE 2 TABLETS BY MOUTH IN THE MORNING AND ONE TABLET IN THE EVENING 90 tablet 5    omeprazole (PRILOSEC) 20 MG delayed release capsule TAKE 1 CAPSULE BY MOUTH TWO TIMES A DAY  60 capsule 5    loratadine (CLARITIN) 10 MG tablet TAKE 1 TABLET BY MOUTH ONE TIME A DAY  30 tablet 5    tiZANidine (ZANAFLEX) 2 MG tablet TAKE 1 TABLET BY MOUTH EVERY EIGHT HOURS AS NEEDED FOR PAIN AND SPASM 30 tablet 0    B-D UF III MINI PEN NEEDLES 31G X 5 MM MISC use five times daily with insulin 100 each 0    montelukast (SINGULAIR) 10 MG tablet TAKE 1 TABLET BY MOUTH ONE TIME A DAY  30 tablet 3    lidocaine viscous hcl (XYLOCAINE) 2 % SOLN solution Take 5 mLs by mouth every 3 hours as needed for Irritation or Pain 100 mL 2    ketoconazole (NIZORAL) 2 % shampoo Apply topically daily as needed.  120 mL 1    Insulin Degludec (TRESIBA FLEXTOUCH) 200 UNIT/ML SOPN inject 160 units subcutaneously once daily 16 pen 3  NOVOLOG FLEXPEN 100 UNIT/ML injection pen INJECT 30 TO 50 UNITS INTO THE SKIN THREE TIMES DAILY BEFORE MEALS 30 mL 3    metOLazone (ZAROXOLYN) 2.5 MG tablet TAKE 1 TABLET BY MOUTH TWO TIMES A WEEK AS NEEDED for weight over 180 pounds. do not take two days in a row 60 tablet 0    blood glucose test strips (FREESTYLE LITE) strip test blood sugar four times daily 200 strip 5    nitroGLYCERIN (NITROSTAT) 0.4 MG SL tablet Place 1 tablet under the tongue every 5 minutes as needed for Chest pain up to max of 3 total doses. If no relief after 1 dose, call 911. 25 tablet 3    vitamin D3 (CHOLECALCIFEROL) 25 MCG (1000 UT) TABS tablet TAKE 3 TABLETS BY MOUTH ONE TIME A DAY 90 tablet 5    levothyroxine (SYNTHROID) 150 MCG tablet Take 1 tablet by mouth every morning (before breakfast) 90 tablet 3    diazePAM (VALIUM) 5 MG tablet Take 5 mg by mouth 2 times daily as needed for Anxiety.  cariprazine hcl (VRAYLAR) 1.5 MG capsule Take 1.5 mg by mouth daily      Febuxostat 80 MG TABS Take 80 mg by mouth daily      pregabalin (LYRICA) 50 MG capsule Take 50 mg by mouth 2 times daily.  torsemide (DEMADEX) 100 MG tablet TAKE 1 TABLET BY MOUTH IN THE MORNING AND 1/2 TABLET IN THE EVENING  135 tablet 3    ranolazine (RANEXA) 500 MG extended release tablet TAKE 1 TABLET BY MOUTH TWO TIMES A DAY  180 tablet 3    leflunomide (ARAVA) 10 MG tablet Take 10 mg by mouth daily       OXYGEN Inhale 3 L into the lungs nightly Sometimes with activity      oxyCODONE (OXYCONTIN) 20 MG extended release tablet Take 20 mg by mouth every 12 hours.       aspirin 81 MG EC tablet Take 81 mg by mouth daily      sulfaSALAzine (AZULFIDINE) 500 MG tablet Take 500 mg by mouth 3 times daily       benztropine (COGENTIN) 1 MG tablet Take 1 mg by mouth nightly       folic acid (FOLVITE) 1 MG tablet Take 1 mg by mouth daily  oxyCODONE-acetaminophen (PERCOCET)  MG per tablet Take 1 tablet by mouth every 4 hours as needed for Pain . Earliest Fill Date: 6/8/17 100 tablet 0    duloxetine (CYMBALTA) 60 MG capsule Take 60 mg by mouth 2 times daily. No current facility-administered medications for this visit. Review of Systems -   Negative for fever  Positive for fatigue  Positive for chronic headaches  Positive for constipation  Negative for congestion  Negative for shortness of breath  Negative for dysuria    PHYSICAL EXAMINATION:  [ INSTRUCTIONS:  \"[x]\" Indicates a positive item  \"[]\" Indicates a negative item  -- DELETE ALL ITEMS NOT EXAMINED]  Vital Signs: (As obtained by patient/caregiver or practitioner observation)    Blood pressure-  Heart rate-    Respiratory rate-    Temperature-  Pulse oximetry-     Constitutional:  [x] Appears well-developed and well-nourished [x] No apparent distress     [] Abnormal-   Mental status  [x] Alert and awake  [x] Oriented to person/place/time [x]Able to follow commands      Eyes:  EOM     [x]  Normal  [] Abnormal-  Sclera   [x]  Normal  [] Abnormal -         Discharge  []  None visible  [] Abnormal -    HENT:   [x] Normocephalic, atraumatic.   [] Abnormal   [x] Mouth/Throat: Mucous membranes are moist.  [x] Normal hearing    External Ears [x] Normal  [] Abnormal-     Neck: [x] No visualized mass     Pulmonary/Chest:  [x] Respiratory effort normal.  [x] No visualized signs of difficulty breathing or respiratory distress         [] Abnormal-      Musculoskeletal: [x] Normal gait with no signs of ataxia          [x] Normal range of motion of neck         [] Abnormal-     Neurological:         [x] No Facial Asymmetry (Cranial nerve 7 motor function) (limited exam to video visit)           [x] No gaze palsy         [] Abnormal-         Skin:  [x] No significant exanthematous lesions or discoloration noted on facial skin          [] Abnormal- Patient relates that her leg weakness is associated with swelling but the swelling is down her leg is stronger. A stroke certainly is possible and can consider checking CT scan of her head however the benefit of getting the CT done may be outweighed by the risk of exposure to COVID-19. Pursuant to the emergency declaration under the [address] [place] [address] waiver authority and the [employer] Act, this Virtual  Visit was conducted, with patient's consent, to reduce the patient's risk of exposure to COVID-19 and provide continuity of care for an established patient. Services were provided through a video synchronous discussion virtually to substitute for in-person clinic visit. 25 minutes spent on Telehealth visit. Telehealth visit was done in lieu of face to face visit due to [facility].

## 2020-12-21 NOTE — TELEPHONE ENCOUNTER
Kerry Ordonez with [facility] states she monitors patient home care vital signs. She states they got an alert today that patient has short of breath and swelling. She states patient's  informed her that patient gained 6.6 lbs over the weekend. Patient weight on 12/18/20 was 184.4 and today it was 191. Patient has virtual visit this afternoon with Dr Jere Clement. Kerry Ordonez states if there are any new orders they can be faxed to #[phone].

## 2020-12-22 ENCOUNTER — HOSPITAL ENCOUNTER (OUTPATIENT)
Age: 50
Setting detail: SPECIMEN
Discharge: HOME | End: 2020-12-22
Payer: COMMERCIAL

## 2020-12-22 ENCOUNTER — TELEPHONE (OUTPATIENT)
Dept: CARDIOLOGY CLINIC | Age: 50
End: 2020-12-22

## 2020-12-22 LAB
ANION GAP SERPL CALCULATED.3IONS-SCNC: 9 MMOL/L (ref 3–16)
BUN BLDV-MCNC: 56 MG/DL (ref 7–20)
CALCIUM SERPL-MCNC: 9.7 MG/DL (ref 8.3–10.6)
CHLORIDE BLD-SCNC: 93 MMOL/L (ref 99–110)
CO2: 32 MMOL/L (ref 21–32)
CREAT SERPL-MCNC: 1.5 MG/DL (ref 0.6–1.1)
GFR AFRICAN AMERICAN: 44
GFR NON-AFRICAN AMERICAN: 36
GLUCOSE BLD-MCNC: 60 MG/DL (ref 70–99)
POTASSIUM SERPL-SCNC: 4.2 MMOL/L (ref 3.5–5.1)
PRO-BNP: 269 PG/ML (ref 0–124)
SODIUM BLD-SCNC: 134 MMOL/L (ref 136–145)

## 2020-12-22 PROCEDURE — 80048 BASIC METABOLIC PNL TOTAL CA: CPT

## 2020-12-22 PROCEDURE — 36415 COLL VENOUS BLD VENIPUNCTURE: CPT

## 2020-12-22 PROCEDURE — 83880 ASSAY OF NATRIURETIC PEPTIDE: CPT

## 2020-12-22 NOTE — TELEPHONE ENCOUNTER
----- Message from HERMES Reddy sent at 12/17/2020 10:18 AM EST -----  Covering labs as Dr Judy Ingram is out of the office  Cardiac labs are stable  Should keep in ARETHA inbox to address crp and sed rate  thanks

## 2020-12-22 NOTE — TELEPHONE ENCOUNTER
----- Message from HERMES Clarke sent at 12/17/2020 10:18 AM EST -----  Covering labs as Dr Cheri Slaughter is out of the office  Cardiac labs are stable  Should keep in ARETHA inbox to address crp and sed rate  thanks

## 2020-12-23 ENCOUNTER — TELEPHONE (OUTPATIENT)
Dept: CARDIOLOGY CLINIC | Age: 50
End: 2020-12-23

## 2020-12-23 NOTE — TELEPHONE ENCOUNTER
Called and spoke with spouse and informed him of message below. He states that the patient takes the medication as prescribed. Would not tell when she last took medication. Only that she takes it how is is prescribed.

## 2020-12-26 PROBLEM — N39.41 URGE INCONTINENCE OF URINE: Status: ACTIVE | Noted: 2020-12-26

## 2020-12-26 PROBLEM — R13.19 ESOPHAGEAL DYSPHAGIA: Status: ACTIVE | Noted: 2020-12-26

## 2020-12-26 PROBLEM — R29.898 WEAKNESS OF RIGHT LOWER EXTREMITY: Status: ACTIVE | Noted: 2020-12-26

## 2020-12-27 NOTE — ASSESSMENT & PLAN NOTE
Patient is having increased difficulty swallowing her food. She has been encouraged to touch base with her gastroenterologist, Dr. Antonio Giles.

## 2020-12-27 NOTE — ASSESSMENT & PLAN NOTE
Patient relates that her leg weakness is associated with swelling but the swelling is down her leg is stronger. A stroke certainly is possible and can consider checking CT scan of her head however the benefit of getting the CT done may be outweighed by the risk of exposure to COVID-19.

## 2020-12-27 NOTE — ASSESSMENT & PLAN NOTE
Patient has multiple complex health issues including diabetes mellitus type 2 followed by Dr. Eusebio Wynn, congestive heart failure followed by Dr. Magdy Cutler, rheumatoid arthritis and gout followed by Dr. Aga Cates, breast cancer followed with Dr. Checo Velasquez, asthma followed by Dr. Porsche Crenshaw, chronic constipation and dysphagia followed by Dr. Anita Shaikh,  significant depression and anxiety followed by psychiatry.

## 2020-12-27 NOTE — ASSESSMENT & PLAN NOTE
Patient's weight is up and patient is more short of breath. Encouraged to reach out to Dr. Tessie Keith. Remains on Aldactone 100 mg every morning/50 mg every afternoon and Demadex 100 mg every morning and 50 mg every afternoon.

## 2020-12-29 ENCOUNTER — HOSPITAL ENCOUNTER (OUTPATIENT)
Age: 50
Setting detail: SPECIMEN
Discharge: HOME | End: 2020-12-29
Payer: COMMERCIAL

## 2020-12-29 ENCOUNTER — TELEPHONE (OUTPATIENT)
Dept: INTERNAL MEDICINE CLINIC | Age: 50
End: 2020-12-29

## 2020-12-29 LAB
ALBUMIN SERPL-MCNC: 4.6 G/DL (ref 3.4–5)
ALP BLD-CCNC: 127 U/L (ref 40–129)
ALT SERPL-CCNC: 11 U/L (ref 10–40)
ANION GAP SERPL CALCULATED.3IONS-SCNC: 13 MMOL/L (ref 3–16)
AST SERPL-CCNC: 15 U/L (ref 15–37)
BASOPHILS ABSOLUTE: 0 K/UL (ref 0–0.2)
BASOPHILS RELATIVE PERCENT: 0.5 %
BILIRUB SERPL-MCNC: <0.2 MG/DL (ref 0–1)
BILIRUBIN DIRECT: <0.2 MG/DL (ref 0–0.3)
BILIRUBIN, INDIRECT: NORMAL MG/DL (ref 0–1)
BUN BLDV-MCNC: 65 MG/DL (ref 7–20)
CALCIUM SERPL-MCNC: 10.1 MG/DL (ref 8.3–10.6)
CHLORIDE BLD-SCNC: 91 MMOL/L (ref 99–110)
CO2: 29 MMOL/L (ref 21–32)
CREAT SERPL-MCNC: 1.4 MG/DL (ref 0.6–1.1)
EOSINOPHILS ABSOLUTE: 0.1 K/UL (ref 0–0.6)
EOSINOPHILS RELATIVE PERCENT: 1.3 %
GFR AFRICAN AMERICAN: 48
GFR NON-AFRICAN AMERICAN: 40
GLUCOSE BLD-MCNC: 481 MG/DL (ref 70–99)
HCT VFR BLD CALC: 38.8 % (ref 36–48)
HEMOGLOBIN: 12.4 G/DL (ref 12–16)
LYMPHOCYTES ABSOLUTE: 1.3 K/UL (ref 1–5.1)
LYMPHOCYTES RELATIVE PERCENT: 18.7 %
MCH RBC QN AUTO: 28.4 PG (ref 26–34)
MCHC RBC AUTO-ENTMCNC: 31.8 G/DL (ref 31–36)
MCV RBC AUTO: 89.3 FL (ref 80–100)
MONOCYTES ABSOLUTE: 0.2 K/UL (ref 0–1.3)
MONOCYTES RELATIVE PERCENT: 2.3 %
NEUTROPHILS ABSOLUTE: 5.6 K/UL (ref 1.7–7.7)
NEUTROPHILS RELATIVE PERCENT: 77.2 %
PDW BLD-RTO: 13.7 % (ref 12.4–15.4)
PLATELET # BLD: 286 K/UL (ref 135–450)
PMV BLD AUTO: 9.7 FL (ref 5–10.5)
POTASSIUM SERPL-SCNC: 4.5 MMOL/L (ref 3.5–5.1)
PRO-BNP: 465 PG/ML (ref 0–124)
RBC # BLD: 4.35 M/UL (ref 4–5.2)
SEDIMENTATION RATE, ERYTHROCYTE: 121 MM/HR (ref 0–30)
SODIUM BLD-SCNC: 133 MMOL/L (ref 136–145)
TOTAL PROTEIN: 8.1 G/DL (ref 6.4–8.2)
URIC ACID, SERUM: 4.8 MG/DL (ref 2.6–6)
WBC # BLD: 7.2 K/UL (ref 4–11)

## 2020-12-29 PROCEDURE — 86140 C-REACTIVE PROTEIN: CPT

## 2020-12-29 PROCEDURE — 84550 ASSAY OF BLOOD/URIC ACID: CPT

## 2020-12-29 PROCEDURE — 36415 COLL VENOUS BLD VENIPUNCTURE: CPT

## 2020-12-29 PROCEDURE — 80048 BASIC METABOLIC PNL TOTAL CA: CPT

## 2020-12-29 PROCEDURE — 85025 COMPLETE CBC W/AUTO DIFF WBC: CPT

## 2020-12-29 PROCEDURE — 83880 ASSAY OF NATRIURETIC PEPTIDE: CPT

## 2020-12-29 PROCEDURE — 85652 RBC SED RATE AUTOMATED: CPT

## 2020-12-29 PROCEDURE — 80076 HEPATIC FUNCTION PANEL: CPT

## 2020-12-29 NOTE — TELEPHONE ENCOUNTER
Extra insulin seems reasonable. Not much data here for me to make recommendations for permanent changes on a patient I do not know and who's diabetes is managed by endocrinology. Was this message meant for Dr. Frankey Barrack office? Recommend follow-up for poorly controlled diabetes at endo office or here. Bring blood glucose readings to appointment.     Will send to RN Minges to see if any additional assistance can be rendered at present

## 2020-12-29 NOTE — TELEPHONE ENCOUNTER
Rizwan Renteria from [employer] calling about the patient. Her random blood sugar right now is 447. Pt. Is very upset due to recent passing's of family members. Pt. Just gave herself 50 units of Novalog.      Rizwan Renteria with [employer] [phone]

## 2020-12-30 ENCOUNTER — TELEPHONE (OUTPATIENT)
Dept: CARDIOLOGY CLINIC | Age: 50
End: 2020-12-30

## 2020-12-30 LAB — C-REACTIVE PROTEIN: 12 MG/L (ref 0–5.1)

## 2020-12-30 NOTE — TELEPHONE ENCOUNTER
Tried to reach patient Not able to leave a message due to voicemail box is full. Our office will try again.  Pt voicemail stated his phone does not ring

## 2020-12-30 NOTE — TELEPHONE ENCOUNTER
----- Message from HERMES Noel CNP sent at 12/30/2020 11:14 AM EST -----  Kidneys a little better, fluid number up a little, she can take a metolazone if wt is up at all.  Shira Stewart

## 2021-01-03 DIAGNOSIS — I42.9 CARDIOMYOPATHY, UNSPECIFIED TYPE (HCC): Chronic | ICD-10-CM

## 2021-01-03 DIAGNOSIS — I25.10 CORONARY ARTERY DISEASE INVOLVING NATIVE CORONARY ARTERY OF NATIVE HEART WITHOUT ANGINA PECTORIS: Chronic | ICD-10-CM

## 2021-01-03 DIAGNOSIS — I50.30 (HFPEF) HEART FAILURE WITH PRESERVED EJECTION FRACTION (HCC): Chronic | ICD-10-CM

## 2021-01-03 DIAGNOSIS — I25.10 CORONARY ARTERY DISEASE DUE TO LIPID RICH PLAQUE: Chronic | ICD-10-CM

## 2021-01-03 DIAGNOSIS — I25.83 CORONARY ARTERY DISEASE DUE TO LIPID RICH PLAQUE: Chronic | ICD-10-CM

## 2021-01-03 DIAGNOSIS — N28.9 RENAL INSUFFICIENCY: Chronic | ICD-10-CM

## 2021-01-04 RX ORDER — MONTELUKAST SODIUM 10 MG/1
TABLET ORAL
Qty: 30 TABLET | Refills: 0 | Status: SHIPPED | OUTPATIENT
Start: 2021-01-04 | End: 2021-02-03

## 2021-01-05 ENCOUNTER — HOSPITAL ENCOUNTER (OUTPATIENT)
Age: 51
Setting detail: SPECIMEN
Discharge: HOME | End: 2021-01-05
Payer: COMMERCIAL

## 2021-01-05 ENCOUNTER — NURSE TRIAGE (OUTPATIENT)
Dept: OTHER | Facility: CLINIC | Age: 51
End: 2021-01-05

## 2021-01-05 LAB
BILIRUBIN URINE: NEGATIVE
BLOOD, URINE: NEGATIVE
CLARITY: CLEAR
COLOR: YELLOW
GLUCOSE URINE: 500 MG/DL
KETONES, URINE: NEGATIVE MG/DL
LEUKOCYTE ESTERASE, URINE: NEGATIVE
MICROSCOPIC EXAMINATION: ABNORMAL
NITRITE, URINE: NEGATIVE
PH UA: 7 (ref 5–8)
PROTEIN UA: NEGATIVE MG/DL
SPECIFIC GRAVITY UA: 1.01 (ref 1–1.03)
URINE TYPE: ABNORMAL
UROBILINOGEN, URINE: 0.2 E.U./DL

## 2021-01-05 PROCEDURE — 87186 SC STD MICRODIL/AGAR DIL: CPT

## 2021-01-05 PROCEDURE — 87077 CULTURE AEROBIC IDENTIFY: CPT

## 2021-01-05 PROCEDURE — 87086 URINE CULTURE/COLONY COUNT: CPT

## 2021-01-05 PROCEDURE — 81003 URINALYSIS AUTO W/O SCOPE: CPT

## 2021-01-05 RX ORDER — TORSEMIDE 100 MG/1
TABLET ORAL
Qty: 135 TABLET | Refills: 3 | Status: SHIPPED | OUTPATIENT
Start: 2021-01-05 | End: 2021-05-11 | Stop reason: SDUPTHER

## 2021-01-05 RX ORDER — RANOLAZINE 500 MG/1
TABLET, EXTENDED RELEASE ORAL
Qty: 180 TABLET | Refills: 3 | Status: SHIPPED | OUTPATIENT
Start: 2021-01-05 | End: 2021-12-20

## 2021-01-05 RX ORDER — SIMVASTATIN 20 MG
TABLET ORAL
Qty: 90 TABLET | Refills: 3 | Status: SHIPPED | OUTPATIENT
Start: 2021-01-05 | End: 2022-01-14

## 2021-01-05 NOTE — TELEPHONE ENCOUNTER
Reason for Disposition   Nursing judgment    Answer Assessment - Initial Assessment Questions  1. REASON FOR CALL or QUESTION: \"What is your reason for calling today? \" or \"How can I best help you? \" or \"What question do you have that I can help answer? \"      Received call from Cecil Membreno who is a  for patient. Currently Eleonora Rinaldi was not with the patient who was at home. Home health care nurse reports patient has swollen leg, urinary retention, and is in extreme pain. Cecil Membreno is requesting a chest x-ray for wheezing, liquid morphine for pain, and would like an order for a UA. Explained that this RN cannot triage patient since patient is not currently with the home health care nurse and second because home health care nurse has already seen and triaged patient. Due to symptoms asked if patient was willing to go to emergency room. Cecil Membreno states patient is refusing to go, has late stage cancer, and would like PCP to give orders. Cecil Membreno will have family call PCP to make appointment.     Protocols used: INFORMATION ONLY CALL - NO TRIAGE-ADULT-[place]

## 2021-01-05 NOTE — TELEPHONE ENCOUNTER
No contact number documented for Annabella Couch RN. I spoke with Sobeida Moran,  with [facility], regarding the nurse triage note. The information relayed in the NT note was obtained from a nurse visit completed on 1/4. The patient has a follow up nurse visit today with her , Milagro. Milli Proctor will have Milagro call me with an update after her visit today with Crow. IF the patient is having symptoms of UTI during today's nurse visit, the nurse will obtain a UA. I will follow up with you regarding the other concerns when I receive an update from Milagro.

## 2021-01-06 ENCOUNTER — TELEPHONE (OUTPATIENT)
Dept: CARDIOLOGY CLINIC | Age: 51
End: 2021-01-06

## 2021-01-06 ENCOUNTER — TELEPHONE (OUTPATIENT)
Dept: INTERNAL MEDICINE CLINIC | Age: 51
End: 2021-01-06

## 2021-01-06 NOTE — TELEPHONE ENCOUNTER
Verbal order given through confidential voicemail. Requested call return from [place] regarding update on patients status.

## 2021-01-06 NOTE — TELEPHONE ENCOUNTER
Per Mayo Castellano RN, patient is requesting liquid morphine due to back pain, right arm pain related to a fall at home on 1/3. Patient refused further evaluation at time of fall. Renata Baird is concerned patient is over medicating - falls asleep while sitting up during sn visit. Renata Baird is aware you cannot prescribe liquid morphine and was advised to follow up with pain management related to uncontrolled pain. Recommended patient schedule appt to address any additional concerns regarding her fall. Regarding chest xray - home health nurse reports concern for fluid overload - bilateral crackles noted during sn visit on 1/6, no s/s respiratory distress noted. O2 sat unknown, but Renata Baird reports it was stable on RA. Accurate weight unknown. Home health does have order from cardiology to obtain bmp/bnp this week. Patient is enrolled with [facility] vitals program - advised nurse to follow up with cardiology regarding concern for fluid overload. Please advise on any additional recommendations.

## 2021-01-06 NOTE — TELEPHONE ENCOUNTER
I spoke with Tanya Richmond. She states that pt's condition is pretty much the same. She fell recently and hurt her back and has been on pain killers . She is requesting more pain medication. Pt states that she is taking for her cancer. But when Tanya Fragoso checked her cancer has been in remission for some time now. I asked who has been prescribing but Tanya Richmond did not know. She stated that she is still checking and will update with ant further information. She wanted to know if it was ok to do the draw next week? I told her ok , but if things change I will call her.

## 2021-01-06 NOTE — TELEPHONE ENCOUNTER
Madi Gomez from [facility] called.  Needs a verbal order that dr. Immanuel Cornejo will continue to sign homecare orders for patient

## 2021-01-06 NOTE — TELEPHONE ENCOUNTER
Dean Knowles with [facility] states patient fell 1/3/21 in the shower. She refused to go for treatment. Patient states she hurt her back and when nurse asked her pain level patient states everything hurts and it is a 10. Nurse is concerned due to patient had one eye open and one eye closed and is over medicated. Patient has oxycodone, percocet & diazepam and was requesting morphine. She states patient can't keep eyes open during conversation and only eats ice chips and watches youtube all day. Nurse states she is really concerned about the medication causing more problems than doing good and patient is not taking care of herself. Dean Knowles states on call staff had to go out Monday night due to patient having 10 lb weight gain overnight. She states she has contacted heart doctor and cancer doctor. Cancer doctor advised breast cancer is in remission.

## 2021-01-07 ENCOUNTER — TELEPHONE (OUTPATIENT)
Dept: INTERNAL MEDICINE CLINIC | Age: 51
End: 2021-01-07

## 2021-01-07 LAB
ORGANISM: ABNORMAL
URINE CULTURE, ROUTINE: ABNORMAL

## 2021-01-07 NOTE — TELEPHONE ENCOUNTER
Nurse Sun Kenney calling with [facility]. They performed a UA on patient and urine glucose was over 500. Also, culture did grow E. Coli. Please advise.

## 2021-01-08 ENCOUNTER — HOSPITAL ENCOUNTER (OUTPATIENT)
Age: 51
Setting detail: SPECIMEN
Discharge: HOME | End: 2021-01-08
Payer: COMMERCIAL

## 2021-01-08 LAB
ANION GAP SERPL CALCULATED.3IONS-SCNC: 10 MMOL/L (ref 3–16)
BASOPHILS ABSOLUTE: 0.1 K/UL (ref 0–0.2)
BASOPHILS RELATIVE PERCENT: 0.6 %
BUN BLDV-MCNC: 43 MG/DL (ref 7–20)
CALCIUM SERPL-MCNC: 10.1 MG/DL (ref 8.3–10.6)
CHLORIDE BLD-SCNC: 89 MMOL/L (ref 99–110)
CO2: 33 MMOL/L (ref 21–32)
CREAT SERPL-MCNC: 1.2 MG/DL (ref 0.6–1.1)
EOSINOPHILS ABSOLUTE: 0.1 K/UL (ref 0–0.6)
EOSINOPHILS RELATIVE PERCENT: 1.5 %
GFR AFRICAN AMERICAN: 57
GFR NON-AFRICAN AMERICAN: 47
GLUCOSE BLD-MCNC: 256 MG/DL (ref 70–99)
HCT VFR BLD CALC: 37.9 % (ref 36–48)
HEMOGLOBIN: 12.3 G/DL (ref 12–16)
LYMPHOCYTES ABSOLUTE: 2.6 K/UL (ref 1–5.1)
LYMPHOCYTES RELATIVE PERCENT: 28.4 %
MCH RBC QN AUTO: 28.7 PG (ref 26–34)
MCHC RBC AUTO-ENTMCNC: 32.5 G/DL (ref 31–36)
MCV RBC AUTO: 88.2 FL (ref 80–100)
MONOCYTES ABSOLUTE: 0.7 K/UL (ref 0–1.3)
MONOCYTES RELATIVE PERCENT: 8.1 %
NEUTROPHILS ABSOLUTE: 5.6 K/UL (ref 1.7–7.7)
NEUTROPHILS RELATIVE PERCENT: 61.4 %
PDW BLD-RTO: 13.5 % (ref 12.4–15.4)
PLATELET # BLD: 289 K/UL (ref 135–450)
PMV BLD AUTO: 9.3 FL (ref 5–10.5)
POTASSIUM SERPL-SCNC: 4.5 MMOL/L (ref 3.5–5.1)
PRO-BNP: 150 PG/ML (ref 0–124)
RBC # BLD: 4.29 M/UL (ref 4–5.2)
SODIUM BLD-SCNC: 132 MMOL/L (ref 136–145)
WBC # BLD: 9.1 K/UL (ref 4–11)

## 2021-01-08 PROCEDURE — 36415 COLL VENOUS BLD VENIPUNCTURE: CPT

## 2021-01-08 PROCEDURE — 80048 BASIC METABOLIC PNL TOTAL CA: CPT

## 2021-01-08 PROCEDURE — 85025 COMPLETE CBC W/AUTO DIFF WBC: CPT

## 2021-01-08 PROCEDURE — 83880 ASSAY OF NATRIURETIC PEPTIDE: CPT

## 2021-01-08 RX ORDER — CIPROFLOXACIN 250 MG/1
250 TABLET, FILM COATED ORAL 2 TIMES DAILY
Qty: 14 TABLET | Refills: 0 | Status: SHIPPED | OUTPATIENT
Start: 2021-01-08 | End: 2021-01-15 | Stop reason: ALTCHOICE

## 2021-01-12 ENCOUNTER — TELEPHONE (OUTPATIENT)
Dept: CARDIOLOGY CLINIC | Age: 51
End: 2021-01-12

## 2021-01-12 ENCOUNTER — HOSPITAL ENCOUNTER (OUTPATIENT)
Age: 51
Setting detail: SPECIMEN
Discharge: HOME | End: 2021-01-12
Payer: COMMERCIAL

## 2021-01-12 PROCEDURE — 83036 HEMOGLOBIN GLYCOSYLATED A1C: CPT

## 2021-01-12 PROCEDURE — 36415 COLL VENOUS BLD VENIPUNCTURE: CPT

## 2021-01-12 NOTE — TELEPHONE ENCOUNTER
Hasn't she been on both for awhile? If so, I would not change either. Ranolazine made a big difference in her chest pain. Hopefully pharmacist sent message to [facility] about colchicine.   ARETHA

## 2021-01-12 NOTE — TELEPHONE ENCOUNTER
Received call from Pharmacist at [phone]  [address] interaction:  Ranolazine inhibits colcine from being properly removed from body. Both drugs should not be used together unless no other drug can be used. If both have to be used the doses should be adjusted. Patient taking Ranolazine 500mg BID (ARETHA) and colchicine 0.6.  QD (Dr. Osmin White)

## 2021-01-13 LAB
ESTIMATED AVERAGE GLUCOSE: 200.1 MG/DL
HBA1C MFR BLD: 8.6 %

## 2021-01-18 ENCOUNTER — TELEPHONE (OUTPATIENT)
Dept: PULMONOLOGY | Age: 51
End: 2021-01-18

## 2021-01-18 ENCOUNTER — VIRTUAL VISIT (OUTPATIENT)
Dept: PULMONOLOGY | Age: 51
End: 2021-01-18
Payer: COMMERCIAL

## 2021-01-18 DIAGNOSIS — J45.40 MODERATE PERSISTENT ASTHMA WITHOUT COMPLICATION: ICD-10-CM

## 2021-01-18 DIAGNOSIS — I50.32 CHRONIC HEART FAILURE WITH PRESERVED EJECTION FRACTION (HCC): ICD-10-CM

## 2021-01-18 DIAGNOSIS — G47.33 OBSTRUCTIVE SLEEP APNEA: ICD-10-CM

## 2021-01-18 DIAGNOSIS — J96.11 CHRONIC RESPIRATORY FAILURE WITH HYPOXIA (HCC): Primary | ICD-10-CM

## 2021-01-18 PROCEDURE — G8427 DOCREV CUR MEDS BY ELIG CLIN: HCPCS | Performed by: INTERNAL MEDICINE

## 2021-01-18 PROCEDURE — 3017F COLORECTAL CA SCREEN DOC REV: CPT | Performed by: INTERNAL MEDICINE

## 2021-01-18 PROCEDURE — G8484 FLU IMMUNIZE NO ADMIN: HCPCS | Performed by: INTERNAL MEDICINE

## 2021-01-18 PROCEDURE — G8417 CALC BMI ABV UP PARAM F/U: HCPCS | Performed by: INTERNAL MEDICINE

## 2021-01-18 PROCEDURE — 1036F TOBACCO NON-USER: CPT | Performed by: INTERNAL MEDICINE

## 2021-01-18 PROCEDURE — 99214 OFFICE O/P EST MOD 30 MIN: CPT | Performed by: INTERNAL MEDICINE

## 2021-01-18 NOTE — PROGRESS NOTES
PULMONARY OFFICE FOLLOW UP NOTE  Pulmonology Video Visit    Pursuant to the emergency declaration under the [facility], 1135 waiver authority and the Coronavirus Preparedness and Response Supplemental Appropriations Act this Video Visit was insisted, with patient's consent, to reduce the patient's risk of exposure to COVID-19 and provide continuity of care for an established patient. The patient was at home, while the provider was at the clinic. Services were provided through a synchronous discussion through a Video Visit to substitute for in-person clinic visit, and coded as such. REASON FOR VISIT:   Chief Complaint   Patient presents with    Asthma       DATE OF VISIT: 1/18/2021    HISTORY OF PRESENT ILLNESS: 45y.o. year old female is here for follow-up of asthma, chronic hypoxic respiratory failure on home oxygen at 2 L/min, heart failure with preserved ejection fraction, obstructive sleep apnea not on CPAP/ BIPAP. she has PMH of history of breast cancer, depression, hypothyroidism, diabetes mellitus type 2. Patient underwent BiPAP titration study in December 2020 and BiPAP 10/7 was prescribed. The prescription was sent to [facility], however, she has not received her machine yet. Today she looks sleepy and lethargic. Denies any worsening shortness of breath. Denies any worsening cough. Weight is stable. Continues to have lower extremity edema. Clinically to use oxygen at 2 L/min. As per patient's , patient ran out of her psychiatric medications and her behavior has been changed since then. He is reaching out to her psychiatrist.     Her baseline weight is around 180 pounds.   Continues to take torsemide 100 mg in the morning and 50 mg in the evening.    Her bronchodilator regimen for asthma consist of Dulera 2 puffs twice daily along with albuterol inhaler as needed.  She also use albuterol nebs twice a day. She also takes Singulair 10 mg daily.     Patient was diagnosed with obstructive sleep apnea many years ago. Saul Neal had 2 sleep studies, one 10 years ago and other close to 5 years ago. [facility]  states that she had two CPAP machines at home. Saul Neal could not get used to the CPAP and stopped wearing them.  She only wears oxygen to sleep at night. She underwent repeat baseline sleep study on 11/9/2020 that showed mild obstructive sleep apnea with AHI 5.8. However, patient did not sleep very well during the sleep study and did not get enough REM sleep. She was saturating less than 90% 404 minutes. Because of patient's cardiovascular risk factors, CPAP titration study was recommended. She underwent CPAP titration study on 12/17/2028 and CPAP could not control LUIS. BiPAP 12/7 was prescribed. However, patient has not received her machine yet.         Diagnostic test reviewed:  I reviewed the chest x-ray from 3/27/2020 and my interpretation is as follows.  Increased pulmonary vascular congestion.     I reviewed the PFT from 5/10/2018 and my interpretation is as follows.  restrictive lung disease with reduced diffusion capacity.     Echocardiogram from 9/20/2020 showed preserved EF of 55 to 60%     Sleep study from 11/9/2020 showed AHI of 5.8/h with saturation of 90% 404 minutes. Sleep study from 12/17/2020-BiPAP 10/7 controls sleep apnea adequately.            REVIEW OF SYSTEMS: 14 point ROS is negative beside mentioned in [place]. CONSTITUTIONAL SYMPTOMS: The patient denies fever, fatigue, night sweats, weight loss or weight gain. HEENT: No vision changes. No tinnitus, Denies sinus pain. No hoarseness, or dysphagia. NECK: Patient denies swelling in the neck. left mastectomy    CARPAL TUNNEL RELEASE      Bilateral    FINGER CONTRACTURE SURGERY      HYSTERECTOMY  2005    USO    TONSILLECTOMY      UPPER GASTROINTESTINAL ENDOSCOPY  2019    stretched esophagous        SOCIAL HISTORY:   Social History     Tobacco Use    Smoking status: Never Smoker    Smokeless tobacco: Never Used   Substance Use Topics    Alcohol use: No    Drug use: No       FAMILY HISTORY:   Family History   Problem Relation Age of Onset    Asthma Other     Cancer Other     Depression Other     Diabetes Other     Hypertension Other     High Cholesterol Other     Migraines Other     Heart Attack Father 72         of MI    High Blood Pressure Mother     Diabetes Mother        MEDICATIONS:     Current Outpatient Medications on File Prior to Visit   Medication Sig Dispense Refill    tiZANidine (ZANAFLEX) 2 MG tablet TAKE 1 TABLET BY MOUTH EVERY EIGHT HOURS AS NEEDED FOR PAIN AND SPASM 30 tablet 2    simvastatin (ZOCOR) 20 MG tablet TAKE 1 TABLET BY MOUTH EVERY DAY AT NIGHT 90 tablet 3    metoprolol tartrate (LOPRESSOR) 25 MG tablet TAKE 1 TABLET BY MOUTH TWO TIMES A DAY  180 tablet 3    torsemide (DEMADEX) 100 MG tablet TAKE 1 TABLET BY MOUTH IN THE MORNING AND 1/2 TABLET BY MOUTH IN THE EVENING 135 tablet 3    ranolazine (RANEXA) 500 MG extended release tablet TAKE 1 TABLET BY MOUTH TWO TIMES A DAY  180 tablet 3    montelukast (SINGULAIR) 10 MG tablet TAKE 1 TABLET BY MOUTH ONE TIME A DAY  30 tablet 0    Ertugliflozin L-PyroglutamicAc (STEGLATRO) 5 MG TABS TAKE 1 TABLET BY MOUTH EVERY DAY 30 tablet 0    senna-docusate (PERICOLACE) 8.6-50 MG per tablet Take 2 tablets by mouth 2 times daily 360 tablet 3    colchicine (COLCRYS) 0.6 MG tablet Take 0.6 mg by mouth daily      senna (SENOKOT) 8.6 MG tablet Take 1 tablet by mouth 2 times daily 60 tablet 11    lubiprostone (AMITIZA) 24 MCG capsule Take 1 capsule by mouth 2 times daily (with meals) 60 capsule 5  butalbital-acetaminophen-caffeine (FIORICET, ESGIC) -40 MG per tablet Take 1 tablet by mouth every 6 hours as needed for Headaches 30 tablet 1    nystatin (MYCOSTATIN) 672467 UNIT/ML suspension Take 5 mLs by mouth 4 times daily 500 mL 1    Magic Mouthwash (MIRACLE MOUTHWASH) Swish and spit 5 mLs 4 times daily as needed for Irritation or Pain 300 mL 2    traZODone (DESYREL) 50 MG tablet Take 1 tablet by mouth nightly Take it on the day of sleep study 1 tablet 0    ferrous sulfate (IRON 325) 325 (65 Fe) MG tablet TAKE 1 TABLET BY MOUTH TWO TIMES A DAY WITH MEALS 180 tablet 0    spironolactone (ALDACTONE) 50 MG tablet TAKE 2 TABLETS BY MOUTH IN THE MORNING AND ONE TABLET IN THE EVENING 90 tablet 5    omeprazole (PRILOSEC) 20 MG delayed release capsule TAKE 1 CAPSULE BY MOUTH TWO TIMES A DAY  60 capsule 5    loratadine (CLARITIN) 10 MG tablet TAKE 1 TABLET BY MOUTH ONE TIME A DAY  30 tablet 5    B-D UF III MINI PEN NEEDLES 31G X 5 MM MISC use five times daily with insulin 100 each 0    lidocaine viscous hcl (XYLOCAINE) 2 % SOLN solution Take 5 mLs by mouth every 3 hours as needed for Irritation or Pain 100 mL 2    ketoconazole (NIZORAL) 2 % shampoo Apply topically daily as needed. 120 mL 1    Insulin Degludec (TRESIBA FLEXTOUCH) 200 UNIT/ML SOPN inject 160 units subcutaneously once daily 16 pen 3    NOVOLOG FLEXPEN 100 UNIT/ML injection pen INJECT 30 TO 50 UNITS INTO THE SKIN THREE TIMES DAILY BEFORE MEALS 30 mL 3    metOLazone (ZAROXOLYN) 2.5 MG tablet TAKE 1 TABLET BY MOUTH TWO TIMES A WEEK AS NEEDED for weight over 180 pounds. do not take two days in a row 60 tablet 0    blood glucose test strips (FREESTYLE LITE) strip test blood sugar four times daily 200 strip 5    nitroGLYCERIN (NITROSTAT) 0.4 MG SL tablet Place 1 tablet under the tongue every 5 minutes as needed for Chest pain up to max of 3 total doses.  If no relief after 1 dose, call 911. 25 tablet 3  vitamin D3 (CHOLECALCIFEROL) 25 MCG (1000 UT) TABS tablet TAKE 3 TABLETS BY MOUTH ONE TIME A DAY 90 tablet 5    levothyroxine (SYNTHROID) 150 MCG tablet Take 1 tablet by mouth every morning (before breakfast) 90 tablet 3    diazePAM (VALIUM) 5 MG tablet Take 5 mg by mouth 2 times daily as needed for Anxiety.  cariprazine hcl (VRAYLAR) 1.5 MG capsule Take 1.5 mg by mouth daily      Febuxostat 80 MG TABS Take 80 mg by mouth daily      pregabalin (LYRICA) 50 MG capsule Take 50 mg by mouth 2 times daily.  leflunomide (ARAVA) 10 MG tablet Take 10 mg by mouth daily       OXYGEN Inhale 3 L into the lungs nightly Sometimes with activity      oxyCODONE (OXYCONTIN) 20 MG extended release tablet Take 20 mg by mouth every 12 hours.  aspirin 81 MG EC tablet Take 81 mg by mouth daily      sulfaSALAzine (AZULFIDINE) 500 MG tablet Take 500 mg by mouth 3 times daily       benztropine (COGENTIN) 1 MG tablet Take 1 mg by mouth nightly       folic acid (FOLVITE) 1 MG tablet Take 1 mg by mouth daily      oxyCODONE-acetaminophen (PERCOCET)  MG per tablet Take 1 tablet by mouth every 4 hours as needed for Pain . Earliest Fill Date: 6/8/17 100 tablet 0    duloxetine (CYMBALTA) 60 MG capsule Take 60 mg by mouth 2 times daily. No current facility-administered medications on file prior to visit. ALLERGIES:   Allergies as of 01/18/2021 - Review Complete 01/18/2021   Allergen Reaction Noted    Iv dye [iodides] Anaphylaxis 04/20/2011    Diazepam Other (See Comments) 04/05/2017    Trazodone and nefazodone Other (See Comments) 09/01/2011      OBJECTIVE:   vitals were not taken for this visit. PHYSICAL EXAM:  Physical Exam    Constitutional:       General: She is not in acute distress. She looks lethargic and sleepy. Appearance: Normal appearance. She is not ill-appearing. HENT:      Head: Normocephalic and atraumatic.       Right Ear: External ear normal. Left Ear: External ear normal.   Eyes:      General: No scleral icterus. Right eye: No discharge. Left eye: No discharge. Extraocular Movements: Extraocular movements intact. Conjunctiva/sclera: Conjunctivae normal.   Neck:      Musculoskeletal: Neck supple. Pulmonary:      Effort: Pulmonary effort is normal. No respiratory distress. Skin:     Coloration: Skin is not jaundiced. Findings: No lesion or rash. Neurological:      Mental Status: She is alert. Cranial Nerves: No cranial nerve deficit. Coordination: Coordination normal.   Psychiatric:         Mood and Affect: Mood normal.         ASSESSMENT AND PLAN:     1. LUIS (obstructive sleep apnea)  Patient was diagnosed with obstructive sleep apnea many years ago. However, she could not get used to CPAP and stopped using it. She is only wearing oxygen to sleep at night. I explained to her about the direct correlation of uncontrolled heart failure with repeated exacerbations and uncontrolled sleep apnea. I explained to her the importance of controlling sleep apnea with CPAP machine. Patient is willing to try CPAP again. She underwent a repeat sleep study on 11/9/2020 that showed mild AHI of 5.8/h. Patient was saturating less than 90% for 404 minutes. Unfortunately, patient did not have a adequate REM sleep. AHI was underestimated. Because of patient's cardiovascular risk factors including heart failure as well as obstructive sleep apnea symptoms including excessive daytime sleepiness and fatigue, CPAP titration study was recommended. Patient underwent CPAP titration study on 12/17/2020 and BiPAP 10/7 was prescribed. Prescription was sent to [facility]. However, patient has not missed received her machine yet. It looks like the cholesterol has been trying to reach the patient but has been unsuccessful. I have advised the patient to call [facility] so that she can get set up with BIPAP. 2. Chronic respiratory failure with hypoxia (HCC)  Likely secondary to heart failure and uncontrolled sleep apnea. There might be some component of obesity hypoventilation syndrome. Continue oxygen at 2 L/min.     3. Chronic heart failure with preserved ejection fraction [facility])  Managed by cardiology. Continue torsemide.     4. Mild persistent asthma without complication  Continue Dulera 2 puffs twice daily. Continue albuterol nebs q12. Continue Singulair 10 mg daily.       Follow up in 3 months. Rigo Lopez MD  Pulmonary Critical Care and Sleep Medicine  Electronically signed by Rigo Lopez MD on 1/18/2021 at 2:27 PM     This note was completed using dragon medical speech recognition software. Grammatical errors, random word insertions, pronoun errors and incomplete sentences are occasional consequences of this technology due to software limitations. If there are questions or concerns about the content of this note of information contained within the body of this dictation they should be addressed with the provider for clarification.

## 2021-01-19 ENCOUNTER — HOSPITAL ENCOUNTER (OUTPATIENT)
Age: 51
Setting detail: SPECIMEN
Discharge: HOME | End: 2021-01-19
Payer: COMMERCIAL

## 2021-01-19 LAB
ANION GAP SERPL CALCULATED.3IONS-SCNC: 11 MMOL/L (ref 3–16)
BUN BLDV-MCNC: 33 MG/DL (ref 7–20)
CALCIUM SERPL-MCNC: 9.9 MG/DL (ref 8.3–10.6)
CHLORIDE BLD-SCNC: 87 MMOL/L (ref 99–110)
CO2: 32 MMOL/L (ref 21–32)
CREAT SERPL-MCNC: 1.3 MG/DL (ref 0.6–1.1)
GFR AFRICAN AMERICAN: 52
GFR NON-AFRICAN AMERICAN: 43
GLUCOSE BLD-MCNC: 406 MG/DL (ref 70–99)
POTASSIUM SERPL-SCNC: 4.9 MMOL/L (ref 3.5–5.1)
PRO-BNP: 203 PG/ML (ref 0–124)
SODIUM BLD-SCNC: 130 MMOL/L (ref 136–145)

## 2021-01-19 PROCEDURE — 80048 BASIC METABOLIC PNL TOTAL CA: CPT

## 2021-01-19 PROCEDURE — 36415 COLL VENOUS BLD VENIPUNCTURE: CPT

## 2021-01-19 PROCEDURE — 83880 ASSAY OF NATRIURETIC PEPTIDE: CPT

## 2021-01-19 PROCEDURE — 83036 HEMOGLOBIN GLYCOSYLATED A1C: CPT

## 2021-01-20 LAB
ESTIMATED AVERAGE GLUCOSE: 197.3 MG/DL
HBA1C MFR BLD: 8.5 %

## 2021-01-26 ENCOUNTER — TELEPHONE (OUTPATIENT)
Dept: CARDIOLOGY CLINIC | Age: 51
End: 2021-01-26

## 2021-01-26 ENCOUNTER — HOSPITAL ENCOUNTER (OUTPATIENT)
Age: 51
Setting detail: SPECIMEN
Discharge: HOME | End: 2021-01-26
Payer: COMMERCIAL

## 2021-01-26 LAB
ANION GAP SERPL CALCULATED.3IONS-SCNC: 10 MMOL/L (ref 3–16)
BUN BLDV-MCNC: 27 MG/DL (ref 7–20)
CALCIUM SERPL-MCNC: 9.1 MG/DL (ref 8.3–10.6)
CHLORIDE BLD-SCNC: 93 MMOL/L (ref 99–110)
CO2: 32 MMOL/L (ref 21–32)
CREAT SERPL-MCNC: 1.1 MG/DL (ref 0.6–1.1)
GFR AFRICAN AMERICAN: >60
GFR NON-AFRICAN AMERICAN: 52
GLUCOSE BLD-MCNC: 238 MG/DL (ref 70–99)
POTASSIUM SERPL-SCNC: 4.7 MMOL/L (ref 3.5–5.1)
PRO-BNP: 158 PG/ML (ref 0–124)
SODIUM BLD-SCNC: 135 MMOL/L (ref 136–145)
URIC ACID, SERUM: 5.4 MG/DL (ref 2.6–6)

## 2021-01-26 PROCEDURE — 83880 ASSAY OF NATRIURETIC PEPTIDE: CPT

## 2021-01-26 PROCEDURE — 36415 COLL VENOUS BLD VENIPUNCTURE: CPT

## 2021-01-26 PROCEDURE — 84550 ASSAY OF BLOOD/URIC ACID: CPT

## 2021-01-26 PROCEDURE — 83036 HEMOGLOBIN GLYCOSYLATED A1C: CPT

## 2021-01-26 PROCEDURE — 80048 BASIC METABOLIC PNL TOTAL CA: CPT

## 2021-01-26 RX ORDER — FLUCONAZOLE 100 MG/1
TABLET ORAL
Qty: 7 TABLET | Refills: 0 | Status: SHIPPED | OUTPATIENT
Start: 2021-01-26 | End: 2021-02-18

## 2021-01-26 NOTE — TELEPHONE ENCOUNTER
----- Message from Noe Iglesias MD sent at 1/24/2021  6:49 PM EST -----  Please call patient. Labs look okay./  No changes.   ARETHA

## 2021-01-26 NOTE — TELEPHONE ENCOUNTER
Spoke to . He is checking her blood sugar 5 times a day and has a sliding scale. He was told no medication changes from [facility]. He feels his wife is depressed.

## 2021-01-27 LAB
ESTIMATED AVERAGE GLUCOSE: 194.4 MG/DL
HBA1C MFR BLD: 8.4 %

## 2021-02-02 ENCOUNTER — HOSPITAL ENCOUNTER (OUTPATIENT)
Age: 51
Setting detail: SPECIMEN
Discharge: HOME | End: 2021-02-02
Payer: COMMERCIAL

## 2021-02-02 LAB
ANION GAP SERPL CALCULATED.3IONS-SCNC: 11 MMOL/L (ref 3–16)
BUN BLDV-MCNC: 47 MG/DL (ref 7–20)
CALCIUM SERPL-MCNC: 9.7 MG/DL (ref 8.3–10.6)
CHLORIDE BLD-SCNC: 91 MMOL/L (ref 99–110)
CO2: 33 MMOL/L (ref 21–32)
CREAT SERPL-MCNC: 1.4 MG/DL (ref 0.6–1.1)
GFR AFRICAN AMERICAN: 48
GFR NON-AFRICAN AMERICAN: 40
GLUCOSE BLD-MCNC: 179 MG/DL (ref 70–99)
POTASSIUM SERPL-SCNC: 4.1 MMOL/L (ref 3.5–5.1)
PRO-BNP: 125 PG/ML (ref 0–124)
SODIUM BLD-SCNC: 135 MMOL/L (ref 136–145)

## 2021-02-02 PROCEDURE — 83880 ASSAY OF NATRIURETIC PEPTIDE: CPT

## 2021-02-02 PROCEDURE — 36415 COLL VENOUS BLD VENIPUNCTURE: CPT

## 2021-02-02 PROCEDURE — 80048 BASIC METABOLIC PNL TOTAL CA: CPT

## 2021-02-02 PROCEDURE — 83036 HEMOGLOBIN GLYCOSYLATED A1C: CPT

## 2021-02-03 LAB
ESTIMATED AVERAGE GLUCOSE: 200.1 MG/DL
HBA1C MFR BLD: 8.6 %

## 2021-02-04 ENCOUNTER — TELEPHONE (OUTPATIENT)
Dept: ENDOCRINOLOGY | Age: 51
End: 2021-02-04

## 2021-02-04 ENCOUNTER — VIRTUAL VISIT (OUTPATIENT)
Dept: ENDOCRINOLOGY | Age: 51
End: 2021-02-04
Payer: COMMERCIAL

## 2021-02-04 DIAGNOSIS — E55.9 VITAMIN D DEFICIENCY: ICD-10-CM

## 2021-02-04 DIAGNOSIS — I25.10 CORONARY ARTERY DISEASE INVOLVING NATIVE CORONARY ARTERY OF NATIVE HEART WITHOUT ANGINA PECTORIS: ICD-10-CM

## 2021-02-04 PROCEDURE — 99214 OFFICE O/P EST MOD 30 MIN: CPT | Performed by: INTERNAL MEDICINE

## 2021-02-04 RX ORDER — INSULIN ASPART 100 [IU]/ML
INJECTION, SOLUTION INTRAVENOUS; SUBCUTANEOUS
Qty: 30 ML | Refills: 3 | Status: SHIPPED | OUTPATIENT
Start: 2021-02-04 | End: 2021-02-05 | Stop reason: SDUPTHER

## 2021-02-04 RX ORDER — ERTUGLIFLOZIN 5 MG/1
TABLET, FILM COATED ORAL
Qty: 30 TABLET | Refills: 3 | Status: SHIPPED | OUTPATIENT
Start: 2021-02-04 | End: 2021-05-25

## 2021-02-04 RX ORDER — BLOOD-GLUCOSE METER
KIT MISCELLANEOUS
Qty: 200 STRIP | Refills: 5 | Status: SHIPPED | OUTPATIENT
Start: 2021-02-04 | End: 2021-02-05

## 2021-02-04 RX ORDER — PEN NEEDLE, DIABETIC 31 GX5/16"
NEEDLE, DISPOSABLE MISCELLANEOUS
Qty: 200 EACH | Refills: 5 | Status: SHIPPED | OUTPATIENT
Start: 2021-02-04 | End: 2022-07-27 | Stop reason: SDUPTHER

## 2021-02-04 RX ORDER — MELATONIN
Qty: 90 TABLET | Refills: 5 | Status: SHIPPED | OUTPATIENT
Start: 2021-02-04 | End: 2022-07-27 | Stop reason: SDUPTHER

## 2021-02-04 RX ORDER — INSULIN DEGLUDEC 200 U/ML
INJECTION, SOLUTION SUBCUTANEOUS
Qty: 20 PEN | Refills: 3 | Status: SHIPPED | OUTPATIENT
Start: 2021-02-04 | End: 2022-02-14

## 2021-02-04 NOTE — TELEPHONE ENCOUNTER
PA approved:    Message from Plan  CaseId:[id number];Status:Approved; Review Type:Prior Auth; Coverage Start Date:01/05/2021; Coverage End Date:02/04/2022

## 2021-02-04 NOTE — TELEPHONE ENCOUNTER
Fax from [facility] requesting a PA be started for [employer] 200 unit/ML pen injectors.     LOV: 2/4/21    FOV:  ----

## 2021-02-04 NOTE — TELEPHONE ENCOUNTER
[employer] changed their formulary this year and no longer covers freestyle test strips. They prefer Onetouch or True metrix. Can pt be switched?

## 2021-02-04 NOTE — PROGRESS NOTES
program. She is compliant with treatment some of the time. Her weight is stable. She is following a generally healthy diet. Meal planning includes avoidance of concentrated sweets. She has had a previous visit with a dietitian. She rarely participates in exercise. There is no change in her home blood glucose trend. Her breakfast blood glucose is taken between 8-9 am. Her breakfast blood glucose range is generally 140-180 mg/dl. Her lunch blood glucose is taken between 12-1 pm. Her lunch blood glucose range is generally 140-180 mg/dl. Her dinner blood glucose is taken between 7-8 pm. Her dinner blood glucose range is generally >200 mg/dl. An ACE inhibitor/angiotensin II receptor blocker is being taken. She sees a podiatrist.Eye exam is current. She denies any significant hypoglycemia since last visit, still frustrated about fluctuation in glucose despite patient not eating anything there could be wide fluctuations from 1  according to the  he does admit that the patient is under a lot of stress and probably some of the medications she takes could contribute to high glucose, she is not taking any steroids. Her fingerstick blood glucose running high    She was in the hospital in [address]       Weight trend: stable  Prior visit with dietician: yes  Current diet: on average, 3 meals per day  Current exercise: rare    Has there been any hospitalization, surgery or major illness since the last visit?  Yes   Has there been any new school, family or social problems since the last visit?  no   Has there been any new family history of members with diabetes, heart disease, stroke, or endocrine related problems since the last visit?  no    Past Medical History:   Diagnosis Date    Anxiety     Anxiety and depression     Arthritis     not sure of specific type    Asthma     CAD (coronary artery disease)     Cancer ([facility] 2007    left breast    Cerebral artery occlusion with cerebral infarction ([facility] insufficiency    [facility]umatoid arthritis) [facility]    Pyelonephritis    History of nephrolithiasis    Immigrant with language difficulty    Morbidly obese ([facility]    H/O TIA (transient ischemic attack) and stroke    Need for isolation    Encounter for medication counseling    Hesitancy of micturition    Weight loss counseling, encounter for    Complex care coordination    Oropharyngeal dysphagia    Constipation due to pain medication    Right hand pain    Fungal dermatitis    Mouth pain    Environmental and seasonal allergies    Hypersomnolence    Enlargement of submandibular gland    Jaw pain    Pain in both lower extremities    Vertigo    Fatigue    Edema of right lower extremity    Hot flashes    Chest pain    Excessive sweating    Tongue pain    Pneumonia    Acute respiratory failure with hypoxemia (HCC)    Tension type headache    Hyperglycemia    Inattention    Severe episode of recurrent major depressive disorder (HCC)    Alopecia    Aphthous ulcer of tongue    Seborrhea capitis in adult    Thrush    Scalp cyst    Acute hypoxemic respiratory failure (HCC)    Intrahepatic bile duct dilation    Urge incontinence of urine    Esophageal dysphagia    Weakness of right lower extremity     Past Surgical History:   Procedure Laterality Date    BREAST SURGERY      left mastectomy    CARPAL TUNNEL RELEASE      Bilateral    FINGER CONTRACTURE SURGERY      HYSTERECTOMY  2005    USO    TONSILLECTOMY      UPPER GASTROINTESTINAL ENDOSCOPY  2019    stretched esophagous      Social History     Socioeconomic History    Marital status:      Spouse name: Soniya Murray Number of children: 3    Years of education: Not on file    Highest education level: Not on file   Occupational History    Occupation: disabled   Social Needs    Financial resource strain: Not on file    Food insecurity     Worry: Not on file     Inability: Not on file   Yottaa needs     Medical: Not on file     Non-medical: Not on file   Tobacco Use    Smoking status: Never Smoker    Smokeless tobacco: Never Used   Substance and Sexual Activity    Alcohol use: No    Drug use: No    Sexual activity: Not Currently   Lifestyle    Physical activity     Days per week: Not on file     Minutes per session: Not on file    Stress: Not on file   Relationships    Social connections     Talks on phone: Not on file     Gets together: Not on file     Attends [employer] service: Not on file     Active member of club or organization: Not on file     Attends meetings of clubs or organizations: Not on file     Relationship status: Not on file    Intimate partner violence     Fear of current or ex partner: Not on file     Emotionally abused: Not on file     Physically abused: Not on file     Forced sexual activity: Not on file   Other Topics Concern    Not on file   Social History Narrative    Not on file     Family History   Problem Relation Age of Onset    Asthma Other     Cancer Other     Depression Other     Diabetes Other     Hypertension Other     High Cholesterol Other     Migraines Other     Heart Attack Father 72         of MI    High Blood Pressure Mother     Diabetes Mother      Current Outpatient Medications   Medication Sig Dispense Refill    Insulin Pen Needle (B-D UF III MINI PEN NEEDLES) 31G X 5 MM MISC use five times daily with insulin 200 each 5    blood glucose test strips (FREESTYLE LITE) strip test blood sugar four times daily 200 strip 5    insulin aspart (NOVOLOG FLEXPEN) 100 UNIT/ML injection pen INJECT 30 TO 50 UNITS INTO THE SKIN THREE TIMES DAILY BEFORE MEALS 30 mL 3    Ertugliflozin L-PyroglutamicAc (STEGLATRO) 5 MG TABS TAKE 1 TABLET BY MOUTH EVERY DAY 30 tablet 3    vitamin D3 (CHOLECALCIFEROL) 25 MCG (1000 UT) TABS tablet TAKE 3 TABLETS BY MOUTH ONE TIME A DAY 90 tablet 5    Insulin Degludec (TRESIBA FLEXTOUCH) 200 UNIT/ML SOPN inject 200 units subcutaneously once daily 20 pen 3    metOLazone (ZAROXOLYN) 2.5 MG tablet TAKE 1 TABLET BY MOUTH TWICE A WEEK AS NEEDED FOR WEIGHT OVER 180POUNDS. DO NOT TAKE 2 DAYS IN A ROW.  36 tablet 2    montelukast (SINGULAIR) 10 MG tablet TAKE 1 TABLET BY MOUTH EVERY DAY 30 tablet 4    tiZANidine (ZANAFLEX) 2 MG tablet TAKE 1 TABLET BY MOUTH EVERY EIGHT HOURS AS NEEDED FOR PAIN AND SPASM 30 tablet 2    simvastatin (ZOCOR) 20 MG tablet TAKE 1 TABLET BY MOUTH EVERY DAY AT NIGHT 90 tablet 3    metoprolol tartrate (LOPRESSOR) 25 MG tablet TAKE 1 TABLET BY MOUTH TWO TIMES A DAY  180 tablet 3    torsemide (DEMADEX) 100 MG tablet TAKE 1 TABLET BY MOUTH IN THE MORNING AND 1/2 TABLET BY MOUTH IN THE EVENING 135 tablet 3    ranolazine (RANEXA) 500 MG extended release tablet TAKE 1 TABLET BY MOUTH TWO TIMES A DAY  180 tablet 3    senna-docusate (PERICOLACE) 8.6-50 MG per tablet Take 2 tablets by mouth 2 times daily 360 tablet 3    colchicine (COLCRYS) 0.6 MG tablet Take 0.6 mg by mouth daily      senna (SENOKOT) 8.6 MG tablet Take 1 tablet by mouth 2 times daily 60 tablet 11    lubiprostone (AMITIZA) 24 MCG capsule Take 1 capsule by mouth 2 times daily (with meals) 60 capsule 5    butalbital-acetaminophen-caffeine (FIORICET, ESGIC) -40 MG per tablet Take 1 tablet by mouth every 6 hours as needed for Headaches 30 tablet 1    nystatin (MYCOSTATIN) 005237 UNIT/ML suspension Take 5 mLs by mouth 4 times daily 500 mL 1    Magic Mouthwash (MIRACLE MOUTHWASH) Swish and spit 5 mLs 4 times daily as needed for Irritation or Pain 300 mL 2    traZODone (DESYREL) 50 MG tablet Take 1 tablet by mouth nightly Take it on the day of sleep study 1 tablet 0    ferrous sulfate (IRON 325) 325 (65 Fe) MG tablet TAKE 1 TABLET BY MOUTH TWO TIMES A DAY WITH MEALS 180 tablet 0    spironolactone (ALDACTONE) 50 MG tablet TAKE 2 TABLETS BY MOUTH IN THE MORNING AND ONE TABLET IN THE EVENING 90 tablet 5    omeprazole (PRILOSEC) 20 MG delayed release capsule TAKE 1 CAPSULE BY MOUTH TWO TIMES A DAY  60 capsule 5    loratadine (CLARITIN) 10 MG tablet TAKE 1 TABLET BY MOUTH ONE TIME A DAY  30 tablet 5    lidocaine viscous hcl (XYLOCAINE) 2 % SOLN solution Take 5 mLs by mouth every 3 hours as needed for Irritation or Pain 100 mL 2    ketoconazole (NIZORAL) 2 % shampoo Apply topically daily as needed. 120 mL 1    nitroGLYCERIN (NITROSTAT) 0.4 MG SL tablet Place 1 tablet under the tongue every 5 minutes as needed for Chest pain up to max of 3 total doses. If no relief after 1 dose, call 911. 25 tablet 3    levothyroxine (SYNTHROID) 150 MCG tablet Take 1 tablet by mouth every morning (before breakfast) 90 tablet 3    diazePAM (VALIUM) 5 MG tablet Take 5 mg by mouth 2 times daily as needed for Anxiety.  cariprazine hcl (VRAYLAR) 1.5 MG capsule Take 1.5 mg by mouth daily      Febuxostat 80 MG TABS Take 80 mg by mouth daily      pregabalin (LYRICA) 50 MG capsule Take 50 mg by mouth 2 times daily.  leflunomide (ARAVA) 10 MG tablet Take 10 mg by mouth daily       OXYGEN Inhale 3 L into the lungs nightly Sometimes with activity      oxyCODONE (OXYCONTIN) 20 MG extended release tablet Take 20 mg by mouth every 12 hours.  aspirin 81 MG EC tablet Take 81 mg by mouth daily      sulfaSALAzine (AZULFIDINE) 500 MG tablet Take 500 mg by mouth 3 times daily       benztropine (COGENTIN) 1 MG tablet Take 1 mg by mouth nightly       folic acid (FOLVITE) 1 MG tablet Take 1 mg by mouth daily      oxyCODONE-acetaminophen (PERCOCET)  MG per tablet Take 1 tablet by mouth every 4 hours as needed for Pain . Earliest Fill Date: 6/8/17 100 tablet 0    duloxetine (CYMBALTA) 60 MG capsule Take 60 mg by mouth 2 times daily.  fluconazole (DIFLUCAN) 100 MG tablet TAKE 1 TABLET BY MOUTH EVERY DAY FOR 7 DAYS 7 tablet 0     No current facility-administered medications for this visit.       Allergies   Allergen Reactions    Iv Dye [Iodides] Anaphylaxis     allergic to ct scan dye, not the MRI    Diazepam Other (See Comments)    Trazodone And Nefazodone Other (See Comments)     Makes patient feel very sluggish     Family Status   Relation Name Status    Other family h/o Alive    Father      Mother  Alive    Sister  Alive    Brother  Alive   . OBJECTIVE:  There were no vitals taken for this visit. Wt Readings from Last 3 Encounters:   10/31/20 187 lb (84.8 kg)   10/07/20 187 lb (84.8 kg)   10/01/20 185 lb (83.9 kg)       Patient is  alert oriented to time ,place and person. Both recent and remote memory intact . Patient has weighed themselves in the last few  weeks and it has been stable     Lab Results   Component Value Date    LABA1C 8.6 2021    LABA1C 8.4 2021    LABA1C 8.5 2021         ASSESSMENT/PLAN:    ---- Type 2 diabetes mellitus with  complication, with long-term current use of insulin also has DAN and gastroparesis --UNCONTROLLED 8.5>>7.3>>7.8>>8.4    Control has worsened since last visit   She still has erratic sleeping and eating habits and tends not to exercise regularly  Currently taking 160    units of tresiba she was advised to increase the dose to   tresiba 90--- units BID  According to  fingerstick blood glucose are fluctuating between 145--300   --- Patient tends to take 30-50 units of Humalog with the meals and bases it on her blood sugar log and the amount of food   ---VGO not approved   -- on Steglatro as Russellann Michelles was not approved with her insurance  She was cautioned about dehydration associated with Steglatro on top of her other diuretics she gets weekly blood work which is managed by cardiology  --- Due to her hypoxemia and MARIA D I stopped the metformin In 2018  --- Glucotrol XL stopped  --- due to worsening creatine     ---Her depression has been hindering in her optimal glycemic control ---she claims to eat only one meal a day and that also has minimal carbs ?  But I m not sure if she really counts her snack at all she tends to snack on dates and fruits which both can lead to elevated blood glucose she typically does not take any short acting insulin for the snacks she was advised to check her fingerstick more often  We had a lengthy discussion about these issues ---she is reliant on her  to help with diabetes care   we had a lengthy discussion about glycmeic goals and treatment options   Hypoglycemia protocol reviewed in detail with patient Patient was advised to carry glucose tablets and also have glucagon emergency kit. Provided with RX for glucagon. Health maintenance  -advised to have annual dilated eye exam uptodate pos retinopathy follows with Dr Shiela Urbina last visit in 2019  -last microalb/creat ratio elevated at >800 will recheck unable to calculate she was advised to follow with nephrology     foot exam --- she saw a podiatrist who gave her steroid shot she goes for regular follow up with her --last visit in 2019      --- Episodes of hot flashes  She is noted to have [address] level less than 0.1, FSH level of 2.2, plasma metanephrines in the normal range in December 2019  Her IGF-I level was elevated I will repeat with the next lab    --- Hypothyroidism    Hypothyroidism TSH ) 0.01 >>0.9 >>2 > 1.2 >>2.3>>0.93 in oct 2020    she is on 150 mcg daily continue on same dose   ----she had thyroid hemiagensis left lobe absent   --She has been issues with swallowing --- had a CT scan of neck done which revealed normal thyroid normal submandibular and normal architecture.        --- Mixed hyperlipidemia  On statins   Last TG high and LDL was 115 in April 2018   Encouraged to work on diet and reduce the amount of fat in her diet as well as work on carbohydrate restriction      --- Hx of breast cancer  Follows with onc   On tamoxifen diet 3 modification which apparently will be stopped in 2019    ---Primary fibromyalgia  On Lyrica follows with Dr Venancio Hudson     --Malignant neoplasm of overlapping sites of left female breast, unspecified estrogen receptor status ([facility]  Breast cancer s/p surgery and chemo 2008   She is on tamoxifen which according to pt gives her pain in her arms and hence she takes percocet     --- Essential hypertension  Controlled now   Continue with current regimen    Patient denies any chest pain any shortness of breath    ----Coronary artery disease involving native coronary artery of native heart without angina pectoris  Stable follows with Dr Ace Cook     --- Lymphedema of upper extremity following lymphadenectomy  Stable     ---- Dysthymia  Depression   She is on Latuda    Her dose has been adjusted   She appears to be alert and oriented to day but was very tearful    ---Asthma without complication  Stable    ---Diabetic polyneuropathy associated with type 2 diabetes mellitus ([facility]  Stable  She is on Lyrica and was on Cymbalta in the past       ---Rh Arthritis   She is on methotrexate and Arava  Plaquenil is also in her med list     ---?Georgie Sanford Burn   Follows with cardiology continues to have multiple admission due to worsening of congestive heart failureshe is on demadex , sprinoloactone and metolazone         Reviewed and/or ordered clinical lab results Yes  Reviewed and/or ordered radiology tests Yes  Reviewed and/or ordered other diagnostic tests Yes  Made a decision to obtain old records Yes  Reviewed and summarized old records Yes        Chetna Mayen was counseled regarding symptoms of current diagnosis, course and complications of disease if inadequately treated, side effects of medications, diagnosis, treatment options, and prognosis, risks, benefits, complications, and alternatives of treatment, labs, imaging and other studies and treatment targets and goals. She understands instructions and counseling. These diagnosis were discussed and reviewed with the patient including the advantages of drug therapy.  She was counseled at this visit on the following: diabetes complication prevention and foot

## 2021-02-04 NOTE — Clinical Note
Please schedule patient a follow-up for 3 months and mail her the blood work order along with some glucose logs, also mail her husbands blood work orders which I entered back in November so he can get them done before his appointment in April they both have the same last name  first name Kesha Fuentes

## 2021-02-05 ENCOUNTER — TELEPHONE (OUTPATIENT)
Dept: CARDIOLOGY CLINIC | Age: 51
End: 2021-02-05

## 2021-02-05 RX ORDER — INSULIN ASPART 100 [IU]/ML
INJECTION, SOLUTION INTRAVENOUS; SUBCUTANEOUS
Qty: 15 PEN | Refills: 3 | Status: SHIPPED | OUTPATIENT
Start: 2021-02-05 | End: 2021-08-24

## 2021-02-05 RX ORDER — BLOOD-GLUCOSE METER
EACH MISCELLANEOUS
Qty: 1 KIT | Refills: 0 | Status: SHIPPED | OUTPATIENT
Start: 2021-02-05 | End: 2021-06-08 | Stop reason: SDUPTHER

## 2021-02-05 RX ORDER — BLOOD SUGAR DIAGNOSTIC
STRIP MISCELLANEOUS
Qty: 200 EACH | Refills: 5 | Status: SHIPPED | OUTPATIENT
Start: 2021-02-05 | End: 2021-09-23 | Stop reason: SDUPTHER

## 2021-02-05 NOTE — TELEPHONE ENCOUNTER
Fax from [place]. Novolog Flexpen. Review written directions to verify actual day supply vs submitted day supply.  If refill is deemed appropriate override w/ EMPLID in [employer]

## 2021-02-05 NOTE — PROGRESS NOTES
Left VM for patient to call office back to schedule a f/u appt. Reminder letter, blank glucose logs, and lab orders placed in the mail. Also mailed her husbands lab orders.

## 2021-02-05 NOTE — TELEPHONE ENCOUNTER
Home care states she was just told today by caregiver that pt's weight today is196.6 on 2/2/21 it was 193 and on 2/3/21 199. Patient has been non compliant. Complaining of sob, home care has someone sched tomorrow to go out to home. They have been told if she has sob at rest to go to ED.

## 2021-02-05 NOTE — TELEPHONE ENCOUNTER
They can go out tomorrow, but I would not do anything different. Her recent labs look great. She will have to give it a few days for the fluid to come off.   ARETHA

## 2021-02-10 ENCOUNTER — HOSPITAL ENCOUNTER (OUTPATIENT)
Age: 51
Setting detail: SPECIMEN
Discharge: HOME | End: 2021-02-10
Payer: COMMERCIAL

## 2021-02-10 LAB
ANION GAP SERPL CALCULATED.3IONS-SCNC: 12 MMOL/L (ref 3–16)
BUN BLDV-MCNC: 51 MG/DL (ref 7–20)
CALCIUM SERPL-MCNC: 9.5 MG/DL (ref 8.3–10.6)
CHLORIDE BLD-SCNC: 89 MMOL/L (ref 99–110)
CO2: 32 MMOL/L (ref 21–32)
CREAT SERPL-MCNC: 1.3 MG/DL (ref 0.6–1.1)
GFR AFRICAN AMERICAN: 52
GFR NON-AFRICAN AMERICAN: 43
GLUCOSE BLD-MCNC: 162 MG/DL (ref 70–99)
POTASSIUM SERPL-SCNC: 4.7 MMOL/L (ref 3.5–5.1)
PRO-BNP: 288 PG/ML (ref 0–124)
SODIUM BLD-SCNC: 133 MMOL/L (ref 136–145)
URIC ACID, SERUM: 4.9 MG/DL (ref 2.6–6)

## 2021-02-10 PROCEDURE — 83880 ASSAY OF NATRIURETIC PEPTIDE: CPT

## 2021-02-10 PROCEDURE — 84550 ASSAY OF BLOOD/URIC ACID: CPT

## 2021-02-10 PROCEDURE — 36415 COLL VENOUS BLD VENIPUNCTURE: CPT

## 2021-02-10 PROCEDURE — 80048 BASIC METABOLIC PNL TOTAL CA: CPT

## 2021-02-11 ENCOUNTER — TELEPHONE (OUTPATIENT)
Dept: CARDIOLOGY CLINIC | Age: 51
End: 2021-02-11

## 2021-02-11 NOTE — TELEPHONE ENCOUNTER
----- Message from Chavo Soliman MD sent at 2/11/2021  9:34 AM EST -----  Please call patient. Labs look okay. No changes.   ARETHA

## 2021-02-17 ENCOUNTER — HOSPITAL ENCOUNTER (OUTPATIENT)
Age: 51
Setting detail: SPECIMEN
Discharge: HOME | End: 2021-02-17
Payer: COMMERCIAL

## 2021-02-17 LAB
ANION GAP SERPL CALCULATED.3IONS-SCNC: 12 MMOL/L (ref 3–16)
BUN BLDV-MCNC: 44 MG/DL (ref 7–20)
CALCIUM SERPL-MCNC: 9.6 MG/DL (ref 8.3–10.6)
CHLORIDE BLD-SCNC: 92 MMOL/L (ref 99–110)
CO2: 27 MMOL/L (ref 21–32)
CREAT SERPL-MCNC: 1.4 MG/DL (ref 0.6–1.1)
GFR AFRICAN AMERICAN: 48
GFR NON-AFRICAN AMERICAN: 39
GLUCOSE BLD-MCNC: 397 MG/DL (ref 70–99)
POTASSIUM SERPL-SCNC: 4.7 MMOL/L (ref 3.5–5.1)
PRO-BNP: 109 PG/ML (ref 0–124)
SODIUM BLD-SCNC: 131 MMOL/L (ref 136–145)

## 2021-02-17 PROCEDURE — 83880 ASSAY OF NATRIURETIC PEPTIDE: CPT

## 2021-02-17 PROCEDURE — 80048 BASIC METABOLIC PNL TOTAL CA: CPT

## 2021-02-17 PROCEDURE — 36415 COLL VENOUS BLD VENIPUNCTURE: CPT

## 2021-02-18 ENCOUNTER — TELEPHONE (OUTPATIENT)
Dept: CARDIOLOGY CLINIC | Age: 51
End: 2021-02-18

## 2021-02-18 ENCOUNTER — VIRTUAL VISIT (OUTPATIENT)
Dept: CARDIOLOGY CLINIC | Age: 51
End: 2021-02-18
Payer: COMMERCIAL

## 2021-02-18 DIAGNOSIS — I10 ESSENTIAL HYPERTENSION: ICD-10-CM

## 2021-02-18 DIAGNOSIS — R06.02 SOB (SHORTNESS OF BREATH): ICD-10-CM

## 2021-02-18 DIAGNOSIS — E89.89 LYMPHEDEMA OF UPPER EXTREMITY FOLLOWING LYMPHADENECTOMY: ICD-10-CM

## 2021-02-18 DIAGNOSIS — I50.32 CHRONIC HEART FAILURE WITH PRESERVED EJECTION FRACTION (HCC): Primary | ICD-10-CM

## 2021-02-18 DIAGNOSIS — I25.10 CORONARY ARTERY DISEASE INVOLVING NATIVE CORONARY ARTERY OF NATIVE HEART WITHOUT ANGINA PECTORIS: ICD-10-CM

## 2021-02-18 DIAGNOSIS — I89.0 LYMPHEDEMA OF UPPER EXTREMITY FOLLOWING LYMPHADENECTOMY: ICD-10-CM

## 2021-02-18 PROCEDURE — 99215 OFFICE O/P EST HI 40 MIN: CPT | Performed by: INTERNAL MEDICINE

## 2021-02-18 PROCEDURE — G8417 CALC BMI ABV UP PARAM F/U: HCPCS | Performed by: INTERNAL MEDICINE

## 2021-02-18 PROCEDURE — 3017F COLORECTAL CA SCREEN DOC REV: CPT | Performed by: INTERNAL MEDICINE

## 2021-02-18 PROCEDURE — 1036F TOBACCO NON-USER: CPT | Performed by: INTERNAL MEDICINE

## 2021-02-18 PROCEDURE — G8484 FLU IMMUNIZE NO ADMIN: HCPCS | Performed by: INTERNAL MEDICINE

## 2021-02-18 PROCEDURE — G8427 DOCREV CUR MEDS BY ELIG CLIN: HCPCS | Performed by: INTERNAL MEDICINE

## 2021-02-18 NOTE — PATIENT INSTRUCTIONS
Plan:  All cardiac test and lab results personally reviewed by me during this office visit. 1.  Lasix 120mg IV Monday 2/22/2021 with [facility]. 2.  Lab orders with [facility] already ordered. 3.  See Dr. Myah Farfan in 3 months.

## 2021-02-18 NOTE — PROGRESS NOTES
[address]  Advanced CHF/Pulmonary Hypertension   Cardiac Follow up       Jem Feliciano  YOB: 1970    Date of Visit:  2/18/21    Chief Complaint   Patient presents with    Congestive Heart Failure     History of Present Illness:  Ms. Razia Holloway is a  45 y.o. female with chronic dCHF, CAD with cardiac cath 2014 at [facility] showing diffuse LAD disease and small vessel disease with normal RCA and LCX (treated medically).  Breast cancer 8+ years ago s/p mastectomy.  LUIS with cpap non compliant.     Her cardiac cath in 2014 showed diffuse LAD disease . She has had issues with fluid overload but multiple echos have shown normal EF and diastolic function. Cardiac MRI performed showed normal LVEF 61% and no evidence of constriction. She was hospitalized 1/12-1/19/18 CXR showed mild pulmonary edema, proBNP 277. VQ neg for PE and flu negative. She was treated for exacerbation of asthma and HFpEF. ~ten years ago she had radiation for breast cancer. She follows with endocrinology Dr. Kristen Raines. She had a LHC/RHC  2/28/18 to r/o pericardial constriction as cause for her frequent episodes of fluid overload due to her history of radiation therapy to left breast. Also was looking for restriction that could cause fluid build up as well. No evidence found. Coronaries showed small vessel CAD due to diabetes. No constriction or restriction. PCWP is about 21 and normal.     She is wearing oxygen at 2 liters. She has a nebulizer at home. Jem Feliciano is a 45 y.o. female evaluated via Video Visit on 2/18/2021. Consent:  She and/or health care decision maker is aware that that she may receive a bill for this telephone service, depending on her insurance coverage, and has provided verbal consent to proceed: Yes    Documentation:  I communicated with the patient and/or health care decision maker about See below.    Details of this discussion including any medical advice provided: Labs, medications and plan of care. I affirm this is a Patient Initiated Episode with an Established Patient who has not had a related appointment within my department in the past 7 days or scheduled within the next 24 hours. Total Time: 30 minutes    Note: not billable if this call serves to triage the patient into an appointment for the relevant concern    2021    TELEHEALTH EVALUATION -- Audio/Visual (During IRTUZ-13 public health emergency)      Jodi Galvez (:  1970) has requested an audio/video evaluation for the following concern(s):    Today she states she is holding onto fluid. States it is difficult to walk. She is having trouble talking too. Labs reviewed with patient and . Discussed that it doesn't always reflect how much fluid is in the tissues. They ask about the weight loss in two to 3 days. She is taking Metolazone 2.5mg every 5 days. She is taking Torsemide 100mg in the morning and 50mg in the evening. They are asking about IV Lasix at home as apposed to the [facility]. Last week her weight went up to 198-194. She is asking about COVID vaccine. Review of Systems    Prior to Visit Medications    Medication Sig Taking? Authorizing Provider   insulin aspart (NOVOLOG FLEXPEN) 100 UNIT/ML injection pen INJECT 50 UNITS INTO THE SKIN THREE TIMES DAILY BEFORE MEALS Yes Mart Corado MD   Ertugliflozin L-PyroglutamicAc (STEGLATRO) 5 MG TABS TAKE 1 TABLET BY MOUTH EVERY DAY Yes Mart Corado MD   vitamin D3 (CHOLECALCIFEROL) 25 MCG (1000 UT) TABS tablet TAKE 3 TABLETS BY MOUTH ONE TIME A DAY Yes Mart Corado MD   Insulin Degludec (TRESIBA FLEXTOUCH) 200 UNIT/ML SOPN inject 200 units subcutaneously once daily  Patient taking differently: 90 Units 2 times daily inject 200 units subcutaneously once daily Yes Mart Corado MD   metOLazone (ZAROXOLYN) 2.5 MG tablet TAKE 1 TABLET BY MOUTH TWICE A WEEK AS NEEDED FOR WEIGHT OVER 180POUNDS. DO NOT TAKE 2 DAYS IN A ROW.   Patient taking differently: Take 2.5 mg by mouth Once every five days Yes Lele Frank MD   montelukast (SINGULAIR) 10 MG tablet TAKE 1 TABLET BY MOUTH EVERY DAY Yes Giselle Sebastian MD   tiZANidine (ZANAFLEX) 2 MG tablet TAKE 1 TABLET BY MOUTH EVERY EIGHT HOURS AS NEEDED FOR PAIN AND SPASM Yes Yariel Crow DO   simvastatin (ZOCOR) 20 MG tablet TAKE 1 TABLET BY MOUTH EVERY DAY AT NIGHT Yes Lele Frank MD   metoprolol tartrate (LOPRESSOR) 25 MG tablet TAKE 1 TABLET BY MOUTH TWO TIMES A DAY  Yes Lele Frank MD   torsemide (DEMADEX) 100 MG tablet TAKE 1 TABLET BY MOUTH IN THE MORNING AND 1/2 TABLET BY MOUTH IN THE EVENING Yes Lele Frank MD   ranolazine (RANEXA) 500 MG extended release tablet TAKE 1 TABLET BY MOUTH TWO TIMES A DAY  Yes Lele Frank MD   colchicine (COLCRYS) 0.6 MG tablet Take 0.6 mg by mouth daily Yes Summer Schneider MD   senna (SENOKOT) 8.6 MG tablet Take 1 tablet by mouth 2 times daily Yes Yariel Crow DO   lubiprostone (AMITIZA) 24 MCG capsule Take 1 capsule by mouth 2 times daily (with meals) Yes Yariel Crow DO   butalbital-acetaminophen-caffeine (FIORICET, ESGIC) -40 MG per tablet Take 1 tablet by mouth every 6 hours as needed for Headaches Yes Yariel Crow DO   nystatin (MYCOSTATIN) 255220 UNIT/ML suspension Take 5 mLs by mouth 4 times daily Yes Yariel Crow DO   traZODone (DESYREL) 50 MG tablet Take 1 tablet by mouth nightly Take it on the day of sleep study Yes Giselle Sebastian MD   ferrous sulfate (IRON 325) 325 (65 Fe) MG tablet TAKE 1 TABLET BY MOUTH TWO TIMES A DAY WITH MEALS Yes Yariel Crow DO   spironolactone (ALDACTONE) 50 MG tablet TAKE 2 TABLETS BY MOUTH IN THE MORNING AND ONE TABLET IN THE EVENING Yes Lele Frank MD   omeprazole (PRILOSEC) 20 MG delayed release capsule TAKE 1 CAPSULE BY MOUTH TWO TIMES A DAY  Yes Yariel Crow DO   loratadine (CLARITIN) 10 MG tablet TAKE 1 TABLET BY MOUTH ONE TIME A DAY  Yes Yariel Crow DO   lidocaine viscous hcl (XYLOCAINE) 2 % SOLN solution Take 5 mLs by mouth every 3 hours as needed for Irritation or Pain Yes Lauren King DO   nitroGLYCERIN (NITROSTAT) 0.4 MG SL tablet Place 1 tablet under the tongue every 5 minutes as needed for Chest pain up to max of 3 total doses. If no relief after 1 dose, call 911. Yes Ramírez Samuels MD   levothyroxine (SYNTHROID) 150 MCG tablet Take 1 tablet by mouth every morning (before breakfast) Yes Neda Holcomb MD   diazePAM (VALIUM) 5 MG tablet Take 5 mg by mouth 2 times daily as needed for Anxiety. Yes Historical Provider, MD   cariprazine hcl (VRAYLAR) 1.5 MG capsule Take 3 mg by mouth daily  Yes Historical Provider, MD   Febuxostat 80 MG TABS Take 80 mg by mouth daily Yes Historical Provider, MD   pregabalin (LYRICA) 50 MG capsule Take 50 mg by mouth 2 times daily. Yes Historical Provider, MD   leflunomide (ARAVA) 10 MG tablet Take 10 mg by mouth daily  Yes Historical Provider, MD   OXYGEN Inhale 2 L into the lungs nightly Sometimes with activity Yes Historical Provider, MD   oxyCODONE (OXYCONTIN) 20 MG extended release tablet Take 20 mg by mouth every 12 hours. Yes Historical Provider, MD   aspirin 81 MG EC tablet Take 81 mg by mouth daily Yes Historical Provider, MD   sulfaSALAzine (AZULFIDINE) 500 MG tablet Take 500 mg by mouth daily  Yes Historical Provider, MD   benztropine (COGENTIN) 1 MG tablet Take 1 mg by mouth nightly  Yes Historical Provider, MD   folic acid (FOLVITE) 1 MG tablet Take 1 mg by mouth daily Yes Historical Provider, MD   oxyCODONE-acetaminophen (PERCOCET)  MG per tablet Take 1 tablet by mouth every 4 hours as needed for Pain . Earliest Fill Date: 6/8/17 Yes HERMES Knight - CNP   duloxetine (CYMBALTA) 60 MG capsule Take 60 mg by mouth 2 times daily. Yes Historical Provider, MD   Blood Glucose Monitoring Suppl (Adolfo Pulido) w/Device KIT Use to check glucose 4 times daily.   Neda Holcomb MD   blood glucose test strips [facility]) strip Test 4 times daily. Gm Fraga MD   Insulin Pen Needle (B-D UF III MINI PEN NEEDLES) 31G X 5 MM MISC use five times daily with insulin  Gm Fraga MD   senna-docusate (PERICOLACE) 8.6-50 MG per tablet Take 2 tablets by mouth 2 times daily  Yariel Crow,    Magic Mouthwash (MIRACLE MOUTHWASH) Swish and spit 5 mLs 4 times daily as needed for Irritation or Pain  Yariel Crow DO   ketoconazole (NIZORAL) 2 % shampoo Apply topically daily as needed. Yariel Crow DO       Social History     Tobacco Use    Smoking status: Never Smoker    Smokeless tobacco: Never Used   Substance Use Topics    Alcohol use: No    Drug use: No          PHYSICAL EXAMINATION:    Vital Signs: (As obtained by patient/caregiver or practitioner observation)    Blood pressure- 152/90 Heart rate- 88   Respiratory rate-    Temperature-  Pulse oximetry- 91% RA    Constitutional: [x] Appears well-developed and well-nourished [x] No apparent distress   Voice quality choppy    [] Abnormal-   Mental status  [x] Alert and awake  [x] Oriented to person/place/time [x]Able to follow commands        HENT:   [x] Normocephalic, atraumatic. [] Abnormal   [] Mouth/Throat: Mucous membranes are moist.       Neck: [x] No visualized mass     Pulmonary/Chest: [x] Respiratory effort normal.  [x] No visualized signs of difficulty breathing or respiratory distress        [] Abnormal-       Neurological:        [x] No Facial Asymmetry (Cranial nerve 7 motor function) (limited exam to video visit)          [] No gaze palsy        [] Abnormal-               Psychiatric:       [x] Normal Affect [] No Hallucinations        [] Abnormal-     Other pertinent observable physical exam findings-  Edema to left hand as usual.   Edema to lower legs. Liz Ga is a 45 y.o. female being evaluated by a Virtual Visit (video visit) encounter to address concerns as mentioned above. A caregiver was present when appropriate.  Due to this being a TeleHealth encounter (During AGPNY-44 public health emergency), evaluation of the following organ systems was limited: Vitals/Constitutional/EENT/Resp/CV/GI//MS/Neuro/Skin/Heme-Lymph-Imm. Pursuant to the emergency declaration under the [address] [facility], [address] [place] [address] and the [employer] Act, this Virtual Visit was conducted with patient's (and/or legal guardian's) consent, to reduce the patient's risk of exposure to COVID-19 and provide necessary medical care. The patient (and/or legal guardian) has also been advised to contact this office for worsening conditions or problems, and seek emergency medical treatment and/or call 911 if deemed necessary. Services were provided through a video synchronous discussion virtually to substitute for in-person clinic visit. Patient and provider were located at their individual homes. An electronic signature was used to authenticate this note.  See Below        Allergies   Allergen Reactions    Iv Dye [Iodides] Anaphylaxis     allergic to ct scan dye, not the MRI    Diazepam Other (See Comments)    Trazodone And Nefazodone Other (See Comments)     Makes patient feel very sluggish     Current Outpatient Medications   Medication Sig Dispense Refill    insulin aspart (NOVOLOG FLEXPEN) 100 UNIT/ML injection pen INJECT 50 UNITS INTO THE SKIN THREE TIMES DAILY BEFORE MEALS 15 pen 3    Ertugliflozin L-PyroglutamicAc (STEGLATRO) 5 MG TABS TAKE 1 TABLET BY MOUTH EVERY DAY 30 tablet 3    vitamin D3 (CHOLECALCIFEROL) 25 MCG (1000 UT) TABS tablet TAKE 3 TABLETS BY MOUTH ONE TIME A DAY 90 tablet 5    Insulin Degludec (TRESIBA FLEXTOUCH) 200 UNIT/ML SOPN inject 200 units subcutaneously once daily (Patient taking differently: 90 Units 2 times daily inject 200 units subcutaneously once daily) 20 pen 3    metOLazone (ZAROXOLYN) 2.5 MG tablet TAKE 1 TABLET BY MOUTH TWICE A WEEK AS NEEDED FOR WEIGHT OVER 180POUNDS.  DO NOT TAKE 2 DAYS IN A ROW. (Patient taking differently: Take 2.5 mg by mouth Once every five days) 36 tablet 2    montelukast (SINGULAIR) 10 MG tablet TAKE 1 TABLET BY MOUTH EVERY DAY 30 tablet 4    tiZANidine (ZANAFLEX) 2 MG tablet TAKE 1 TABLET BY MOUTH EVERY EIGHT HOURS AS NEEDED FOR PAIN AND SPASM 30 tablet 2    simvastatin (ZOCOR) 20 MG tablet TAKE 1 TABLET BY MOUTH EVERY DAY AT NIGHT 90 tablet 3    metoprolol tartrate (LOPRESSOR) 25 MG tablet TAKE 1 TABLET BY MOUTH TWO TIMES A DAY  180 tablet 3    torsemide (DEMADEX) 100 MG tablet TAKE 1 TABLET BY MOUTH IN THE MORNING AND 1/2 TABLET BY MOUTH IN THE EVENING 135 tablet 3    ranolazine (RANEXA) 500 MG extended release tablet TAKE 1 TABLET BY MOUTH TWO TIMES A DAY  180 tablet 3    colchicine (COLCRYS) 0.6 MG tablet Take 0.6 mg by mouth daily      senna (SENOKOT) 8.6 MG tablet Take 1 tablet by mouth 2 times daily 60 tablet 11    lubiprostone (AMITIZA) 24 MCG capsule Take 1 capsule by mouth 2 times daily (with meals) 60 capsule 5    butalbital-acetaminophen-caffeine (FIORICET, ESGIC) -40 MG per tablet Take 1 tablet by mouth every 6 hours as needed for Headaches 30 tablet 1    nystatin (MYCOSTATIN) 529047 UNIT/ML suspension Take 5 mLs by mouth 4 times daily 500 mL 1    traZODone (DESYREL) 50 MG tablet Take 1 tablet by mouth nightly Take it on the day of sleep study 1 tablet 0    ferrous sulfate (IRON 325) 325 (65 Fe) MG tablet TAKE 1 TABLET BY MOUTH TWO TIMES A DAY WITH MEALS 180 tablet 0    spironolactone (ALDACTONE) 50 MG tablet TAKE 2 TABLETS BY MOUTH IN THE MORNING AND ONE TABLET IN THE EVENING 90 tablet 5    omeprazole (PRILOSEC) 20 MG delayed release capsule TAKE 1 CAPSULE BY MOUTH TWO TIMES A DAY  60 capsule 5    loratadine (CLARITIN) 10 MG tablet TAKE 1 TABLET BY MOUTH ONE TIME A DAY  30 tablet 5    lidocaine viscous hcl (XYLOCAINE) 2 % SOLN solution Take 5 mLs by mouth every 3 hours as needed for Irritation or Pain 100 mL 2    nitroGLYCERIN (NITROSTAT) 0.4 MG SL tablet Place 1 tablet under the tongue every 5 minutes as needed for Chest pain up to max of 3 total doses. If no relief after 1 dose, call 911. 25 tablet 3    levothyroxine (SYNTHROID) 150 MCG tablet Take 1 tablet by mouth every morning (before breakfast) 90 tablet 3    diazePAM (VALIUM) 5 MG tablet Take 5 mg by mouth 2 times daily as needed for Anxiety.  cariprazine hcl (VRAYLAR) 1.5 MG capsule Take 3 mg by mouth daily       Febuxostat 80 MG TABS Take 80 mg by mouth daily      pregabalin (LYRICA) 50 MG capsule Take 50 mg by mouth 2 times daily.  leflunomide (ARAVA) 10 MG tablet Take 10 mg by mouth daily       OXYGEN Inhale 2 L into the lungs nightly Sometimes with activity      oxyCODONE (OXYCONTIN) 20 MG extended release tablet Take 20 mg by mouth every 12 hours.  aspirin 81 MG EC tablet Take 81 mg by mouth daily      sulfaSALAzine (AZULFIDINE) 500 MG tablet Take 500 mg by mouth daily       benztropine (COGENTIN) 1 MG tablet Take 1 mg by mouth nightly       folic acid (FOLVITE) 1 MG tablet Take 1 mg by mouth daily      oxyCODONE-acetaminophen (PERCOCET)  MG per tablet Take 1 tablet by mouth every 4 hours as needed for Pain . Earliest Fill Date: 6/8/17 100 tablet 0    duloxetine (CYMBALTA) 60 MG capsule Take 60 mg by mouth 2 times daily.  Blood Glucose Monitoring Suppl (ONETOUCH VERIO) w/Device KIT Use to check glucose 4 times daily. 1 kit 0    blood glucose test strips (ONETOUCH VERIO) strip Test 4 times daily.  200 each 5    Insulin Pen Needle (B-D UF III MINI PEN NEEDLES) 31G X 5 MM MISC use five times daily with insulin 200 each 5    senna-docusate (PERICOLACE) 8.6-50 MG per tablet Take 2 tablets by mouth 2 times daily 360 tablet 3    Magic Mouthwash (MIRACLE MOUTHWASH) Swish and spit 5 mLs 4 times daily as needed for Irritation or Pain 300 mL 2    ketoconazole (NIZORAL) 2 % shampoo Apply topically daily as needed. 120 mL 1     No current facility-administered medications for this visit.       Past Medical History:   Diagnosis Date    Anxiety     Anxiety and depression     Arthritis     not sure of specific type    Asthma     CAD (coronary artery disease)     Cancer ([facility]     left breast    Cerebral artery occlusion with cerebral infarction ([facility]     ,     CHF (congestive heart failure) ([facility]     Chronic kidney disease     renal insufficency/to see Dr Nolvia Erazo    Chronic pain     Constipation     COPD (chronic obstructive pulmonary disease) ([facility]     Depression     Diabetes mellitus ([facility]     Diabetic polyneuropathy associated with type 2 diabetes mellitus ([facility] 2018    Dysthymia 2018    ESBL (extended spectrum beta-lactamase) producing bacteria infection 2018    urine    Fibromyalgia     Gastric ulcer, unspecified as acute or chronic, without mention of hemorrhage, perforation, or obstruction     GERD (gastroesophageal reflux disease)     Gout     HIGH CHOLESTEROL     Hypertension     Hypothyroidism     Severe persistent asthma without complication     Thyroid disease     TIA (transient ischemic attack)     with occasional left leg and hand weakness     Past Surgical History:   Procedure Laterality Date    BREAST SURGERY      left mastectomy    CARPAL TUNNEL RELEASE      Bilateral    FINGER CONTRACTURE SURGERY      HYSTERECTOMY  2005    USO    TONSILLECTOMY      UPPER GASTROINTESTINAL ENDOSCOPY  2019    stretched esophagous      Family History   Problem Relation Age of Onset    Asthma Other     Cancer Other     Depression Other     Diabetes Other     Hypertension Other     High Cholesterol Other     Migraines Other     Heart Attack Father 72         of MI    High Blood Pressure Mother     Diabetes Mother      Social History     Socioeconomic History    Marital status:      Spouse name: Harriet Hearing Number of children: 3    Years of education: Not on file    Highest education level: Not on file   Occupational History    Occupation: disabled   Social Needs    Financial resource strain: Not on file    Food insecurity     Worry: Not on file     Inability: Not on file   [employer] needs     Medical: Not on file     Non-medical: Not on file   Tobacco Use    Smoking status: Never Smoker    Smokeless tobacco: Never Used   Substance and Sexual Activity    Alcohol use: No    Drug use: No    Sexual activity: Not Currently   Lifestyle    Physical activity     Days per week: Not on file     Minutes per session: Not on file    Stress: Not on file   Relationships    Social connections     Talks on phone: Not on file     Gets together: Not on file     Attends [employer] service: Not on file     Active member of club or organization: Not on file     Attends meetings of clubs or organizations: Not on file     Relationship status: Not on file    Intimate partner violence     Fear of current or ex partner: Not on file     Emotionally abused: Not on file     Physically abused: Not on file     Forced sexual activity: Not on file   Other Topics Concern    Not on file   Social History Narrative    Not on file     Review of Systems:   · Constitutional: there has been no unanticipated weight loss. There's been no change in energy level, sleep pattern, or activity level. · Eyes: No visual changes or diplopia. No scleral icterus. · ENT: No Headaches, hearing loss or vertigo. No mouth sores or sore throat. · Cardiovascular: Reviewed in HPI  · Respiratory: No cough or wheezing, no sputum production. No hematemesis. · Gastrointestinal: No abdominal pain, appetite loss, blood in stools. No change in bowel or bladder habits. · Genitourinary: No dysuria, trouble voiding, or hematuria. · Musculoskeletal:  No gait disturbance, weakness or joint complaints. · Integumentary: No rash or pruritis.   · Neurological: No headache, scan negative 1/11/2018    CXR 1/16/18  No acute cardiopulmonary disease     Echo 11/8/2017:  Normal left ventricle size and systolic function with an estimated ejection fraction of 60%. No regional wall motion abnormalities are seen. There is borderline concentric left ventricular hypertrophy.   E/e\"= 13     Stress test 11/8/2017: There is normal isotope uptake at stress and rest. There is no evidence of  myocardial ischemia or scar.  Normal LV function.  Overall findings represent a low risk scan.       VQ scan 1/11/2018:  Normal study.  No evidence of pulmonary embolus. Labs were reviewed including labs from other hospital systems through [facility]. Cardiac testing was reviewed including echos, nuclear scans, cardiac catheterization, including from other hospital systems through [facility].     Assessment:  1. Chronic heart failure with preserved ejection fraction ([address]    2. Coronary artery disease involving native coronary artery of native heart without angina pectoris    3. Essential hypertension    4. SOB (shortness of breath)    5. Lymphedema of upper extremity following lymphadenectomy        1. Chronic heart failure with preserved ejection fraction ([address] :  Partially compensated. ProBNP 6/11/20> 125, 6/15/20> 132, 2/17/21> 109. Keep weight at 175-180 pounds: Torsemide 100mg in the am and 50mg in the evening. Less than 174 pounds, take torsemide 50 mg twice daily. Continue Metolazone 3 times in a 2 week interval.  Watch weight and if weight is staying down, only take it once a week. -Weight 187 and stable at home. 2. Coronary artery disease involving native coronary artery of native heart without angina pectoris:   ~Denies chest pain. 3. Renal insufficiency:  Stable at 1.3-1.4.       4. SOB (shortness of breath):  Chronic SOB. --Continue Spironolactone and Torsemide.    -Labs every 2 weeks with [facility]. ~Lasix 120mg IVP x1.      5. Essential hypertension: -Stable. ~/90, 88. Holding onto more fluid. Lymphedema of upper extremity following lymphadenectomy:  L>R.     -++ chronic edema to left hand and left arm. Try to keep it elevated. Insurance will not cover automatic lymph machine. ~LUIS (obstructive sleep apnea): Uses CPAP therapy.      ~ CAD with angina pectoris:  Chronic chest pain usually relieved with 1-2 NTG. [address] 2014 with diffuse LAD disease and small vessel disease. ~Hyperlipidemia LDL goal <70:  2/2019> . Not at goal.  Continue Zocor.    ~ Rheumatoid arthritis involving multiple sites:  She is on chronic pain meds. Plan:  All cardiac test and lab results personally reviewed by me during this office visit. 1.  Lasix 120mg [facility] Monday 2/22/2021 [facility]. 2.  Lab orders in place. 3.  See Dr. Andres Browning in 3 months. Scribe's attestation: This note was scribed in the presence of Pooja Lu M.D. by Sivan Richards RN     The scribe's documentation has been prepared under my direction and personally reviewed by me in its entirety. I confirm that the note above accurately reflects all work, treatment, procedures, and medical decision making performed by me. Continue Metolazone 3 times in a 2 week interval.  Watch weight and if weight is staying down, only take it once a week. QUALITY MEASURES  1. Tobacco Cessation Counseling: NA  2. Retake of BP if >140/90: NA  3. Documentation to PCP/referring for new patient:  Sent to PCP at close of office visit. Yes  4. CAD patient on anti-platelet:   5. CAD patient on STATIN therapy:    6. Patient with CHF and aFib on anticoagulation                        Time Based Itemization  A total of 40 minutes was spent on today's patient encounter.   If applicable, non-patient-facing activities:  ( x)Preparing to see the patient and reviewing records  (x ) Individual interpretation of results  ( ) Discussion or coordination of care with other health care professionals  ( x) Ordering of unique tests, medications, or procedures  ( x) Documentation within the EHR                                      :      Katty Roa MD [facility]

## 2021-02-18 NOTE — TELEPHONE ENCOUNTER
----- Message from Harriet Douglass MD sent at 2/17/2021 10:55 PM EST -----  Call patient. Labs are okay. No changes.   ARETHA

## 2021-02-19 ENCOUNTER — TELEPHONE (OUTPATIENT)
Dept: CARDIOLOGY CLINIC | Age: 51
End: 2021-02-19

## 2021-02-19 NOTE — TELEPHONE ENCOUNTER
Spoke to Russell kapadia from [facility]. She has coordinated care with [facility]. She was worried the dose of Lasix needed to go out last night when I called and faxed her at 5:30pm.  The patient is getting the dose today. Spoke to Leda Galindo from [facility]. The Lasix 120mg IVP will be given today before 5pm.  No further issues.

## 2021-02-19 NOTE — TELEPHONE ENCOUNTER
Pako Hart from [address] wants Kellee Steen to call her back about the order she gave yesterday she said that she is in the office and Emily Arshad has her number.      pls advise thank you

## 2021-02-23 ENCOUNTER — HOSPITAL ENCOUNTER (OUTPATIENT)
Age: 51
Setting detail: SPECIMEN
Discharge: HOME | End: 2021-02-23
Payer: COMMERCIAL

## 2021-02-23 LAB
ANION GAP SERPL CALCULATED.3IONS-SCNC: 13 MMOL/L (ref 3–16)
BUN BLDV-MCNC: 48 MG/DL (ref 7–20)
CALCIUM SERPL-MCNC: 9.7 MG/DL (ref 8.3–10.6)
CHLORIDE BLD-SCNC: 91 MMOL/L (ref 99–110)
CO2: 30 MMOL/L (ref 21–32)
CREAT SERPL-MCNC: 1.4 MG/DL (ref 0.6–1.1)
GFR AFRICAN AMERICAN: 48
GFR NON-AFRICAN AMERICAN: 39
GLUCOSE BLD-MCNC: 354 MG/DL (ref 70–99)
POTASSIUM SERPL-SCNC: 4.2 MMOL/L (ref 3.5–5.1)
PRO-BNP: 275 PG/ML (ref 0–124)
SODIUM BLD-SCNC: 134 MMOL/L (ref 136–145)

## 2021-02-23 PROCEDURE — 83880 ASSAY OF NATRIURETIC PEPTIDE: CPT

## 2021-02-23 PROCEDURE — 80048 BASIC METABOLIC PNL TOTAL CA: CPT

## 2021-02-23 PROCEDURE — 36415 COLL VENOUS BLD VENIPUNCTURE: CPT

## 2021-02-24 ENCOUNTER — TELEPHONE (OUTPATIENT)
Dept: CARDIOLOGY CLINIC | Age: 51
End: 2021-02-24

## 2021-02-24 NOTE — TELEPHONE ENCOUNTER
----- Message from Rupert Mayberry MD sent at 2/24/2021  2:17 PM EST -----  Please call and find out how much weight patient lost with IV lasix. Her labs look good.   ARETHA

## 2021-03-02 ENCOUNTER — HOSPITAL ENCOUNTER (OUTPATIENT)
Age: 51
Setting detail: SPECIMEN
Discharge: HOME | End: 2021-03-02
Payer: COMMERCIAL

## 2021-03-02 ENCOUNTER — TELEPHONE (OUTPATIENT)
Dept: CARDIOLOGY CLINIC | Age: 51
End: 2021-03-02

## 2021-03-02 ENCOUNTER — IMMUNIZATION (OUTPATIENT)
Dept: PRIMARY CARE CLINIC | Age: 51
End: 2021-03-02
Payer: COMMERCIAL

## 2021-03-02 LAB
ANION GAP SERPL CALCULATED.3IONS-SCNC: 10 MMOL/L (ref 3–16)
BUN BLDV-MCNC: 60 MG/DL (ref 7–20)
CALCIUM SERPL-MCNC: 9.6 MG/DL (ref 8.3–10.6)
CHLORIDE BLD-SCNC: 95 MMOL/L (ref 99–110)
CO2: 30 MMOL/L (ref 21–32)
CREAT SERPL-MCNC: 1.4 MG/DL (ref 0.6–1.1)
GFR AFRICAN AMERICAN: 48
GFR NON-AFRICAN AMERICAN: 39
GLUCOSE BLD-MCNC: 202 MG/DL (ref 70–99)
POTASSIUM SERPL-SCNC: 4.7 MMOL/L (ref 3.5–5.1)
PRO-BNP: 93 PG/ML (ref 0–124)
SODIUM BLD-SCNC: 135 MMOL/L (ref 136–145)

## 2021-03-02 PROCEDURE — 83880 ASSAY OF NATRIURETIC PEPTIDE: CPT

## 2021-03-02 PROCEDURE — 0011A COVID-19, MODERNA VACCINE 100MCG/0.5ML DOSE: CPT | Performed by: FAMILY MEDICINE

## 2021-03-02 PROCEDURE — 80048 BASIC METABOLIC PNL TOTAL CA: CPT

## 2021-03-02 PROCEDURE — 36415 COLL VENOUS BLD VENIPUNCTURE: CPT

## 2021-03-02 PROCEDURE — 91301 COVID-19, MODERNA VACCINE 100MCG/0.5ML DOSE: CPT | Performed by: FAMILY MEDICINE

## 2021-03-02 NOTE — TELEPHONE ENCOUNTER
----- Message from Gayla Torres MD sent at 2/24/2021  2:17 PM EST -----  Please call and find out how much weight patient lost with IV lasix. Her labs look good.   ARETHA

## 2021-03-03 DIAGNOSIS — D50.9 IRON DEFICIENCY ANEMIA, UNSPECIFIED IRON DEFICIENCY ANEMIA TYPE: ICD-10-CM

## 2021-03-03 RX ORDER — FERROUS SULFATE 325(65) MG
TABLET ORAL
Qty: 180 TABLET | Refills: 0 | Status: SHIPPED | OUTPATIENT
Start: 2021-03-03 | End: 2021-06-01

## 2021-03-04 ENCOUNTER — TELEPHONE (OUTPATIENT)
Dept: CARDIOLOGY CLINIC | Age: 51
End: 2021-03-04

## 2021-03-04 NOTE — TELEPHONE ENCOUNTER
Spoke to  regarding labs per ARETHA. He's aware no changes. #186 weight yesterday. She is still sleeping today. She is maintaining her weight loss post IV Lasix home infusion on 2/19/21.   (lost 4 pounds)

## 2021-03-10 ENCOUNTER — HOSPITAL ENCOUNTER (OUTPATIENT)
Age: 51
Setting detail: SPECIMEN
Discharge: HOME | End: 2021-03-10
Payer: COMMERCIAL

## 2021-03-10 LAB
ANION GAP SERPL CALCULATED.3IONS-SCNC: 10 MMOL/L (ref 3–16)
BUN BLDV-MCNC: 60 MG/DL (ref 7–20)
CALCIUM SERPL-MCNC: 10.2 MG/DL (ref 8.3–10.6)
CHLORIDE BLD-SCNC: 93 MMOL/L (ref 99–110)
CO2: 33 MMOL/L (ref 21–32)
CREAT SERPL-MCNC: 1.1 MG/DL (ref 0.6–1.1)
GFR AFRICAN AMERICAN: >60
GFR NON-AFRICAN AMERICAN: 52
GLUCOSE BLD-MCNC: 481 MG/DL (ref 70–99)
POTASSIUM SERPL-SCNC: 5 MMOL/L (ref 3.5–5.1)
PRO-BNP: 110 PG/ML (ref 0–124)
SODIUM BLD-SCNC: 136 MMOL/L (ref 136–145)

## 2021-03-10 PROCEDURE — 36415 COLL VENOUS BLD VENIPUNCTURE: CPT

## 2021-03-10 PROCEDURE — 80048 BASIC METABOLIC PNL TOTAL CA: CPT

## 2021-03-10 PROCEDURE — 83880 ASSAY OF NATRIURETIC PEPTIDE: CPT

## 2021-03-16 ENCOUNTER — TELEPHONE (OUTPATIENT)
Dept: CARDIOLOGY CLINIC | Age: 51
End: 2021-03-16

## 2021-03-17 ENCOUNTER — HOSPITAL ENCOUNTER (OUTPATIENT)
Age: 51
Setting detail: SPECIMEN
Discharge: HOME | End: 2021-03-17
Payer: COMMERCIAL

## 2021-03-17 LAB
ANION GAP SERPL CALCULATED.3IONS-SCNC: 12 MMOL/L (ref 3–16)
BUN BLDV-MCNC: 63 MG/DL (ref 7–20)
CALCIUM SERPL-MCNC: 9.9 MG/DL (ref 8.3–10.6)
CHLORIDE BLD-SCNC: 88 MMOL/L (ref 99–110)
CO2: 29 MMOL/L (ref 21–32)
CREAT SERPL-MCNC: 1.7 MG/DL (ref 0.6–1.1)
GFR AFRICAN AMERICAN: 38
GFR NON-AFRICAN AMERICAN: 32
GLUCOSE BLD-MCNC: 409 MG/DL (ref 70–99)
POTASSIUM SERPL-SCNC: 4.4 MMOL/L (ref 3.5–5.1)
PRO-BNP: 140 PG/ML (ref 0–124)
SODIUM BLD-SCNC: 129 MMOL/L (ref 136–145)

## 2021-03-17 PROCEDURE — 83880 ASSAY OF NATRIURETIC PEPTIDE: CPT

## 2021-03-17 PROCEDURE — 80048 BASIC METABOLIC PNL TOTAL CA: CPT

## 2021-03-17 PROCEDURE — 36415 COLL VENOUS BLD VENIPUNCTURE: CPT

## 2021-03-17 NOTE — ASSESSMENT & PLAN NOTE
Suspect related to medications (? Cymbalta). Dr. Hugo Anderson did check labs for menopause and she does not appear to be menopausal. Does not sound to be related to fluctuations in her blood sugars. Encouraged to discuss her sweating with her psychiatrist to see if her Cymbalta could be changed. Albendazole Counseling:  I discussed with the patient the risks of albendazole including but not limited to cytopenia, kidney damage, nausea/vomiting and severe allergy.  The patient understands that this medication is being used in an off-label manner.

## 2021-03-22 ENCOUNTER — TELEPHONE (OUTPATIENT)
Dept: CARDIOLOGY CLINIC | Age: 51
End: 2021-03-22

## 2021-03-22 ENCOUNTER — HOSPITAL ENCOUNTER (OUTPATIENT)
Age: 51
Setting detail: SPECIMEN
Discharge: HOME | End: 2021-03-22
Payer: COMMERCIAL

## 2021-03-22 LAB
ANION GAP SERPL CALCULATED.3IONS-SCNC: 12 MMOL/L (ref 3–16)
BASOPHILS ABSOLUTE: 0 K/UL (ref 0–0.2)
BASOPHILS RELATIVE PERCENT: 0.6 %
BUN BLDV-MCNC: 61 MG/DL (ref 7–20)
CALCIUM SERPL-MCNC: 9.6 MG/DL (ref 8.3–10.6)
CHLORIDE BLD-SCNC: 92 MMOL/L (ref 99–110)
CO2: 30 MMOL/L (ref 21–32)
CREAT SERPL-MCNC: 1.8 MG/DL (ref 0.6–1.1)
EOSINOPHILS ABSOLUTE: 0.1 K/UL (ref 0–0.6)
EOSINOPHILS RELATIVE PERCENT: 1.7 %
GFR AFRICAN AMERICAN: 36
GFR NON-AFRICAN AMERICAN: 30
GLUCOSE BLD-MCNC: 220 MG/DL (ref 70–99)
HCT VFR BLD CALC: 37.4 % (ref 36–48)
HEMOGLOBIN: 12.5 G/DL (ref 12–16)
LYMPHOCYTES ABSOLUTE: 2.3 K/UL (ref 1–5.1)
LYMPHOCYTES RELATIVE PERCENT: 28.2 %
MCH RBC QN AUTO: 28.8 PG (ref 26–34)
MCHC RBC AUTO-ENTMCNC: 33.4 G/DL (ref 31–36)
MCV RBC AUTO: 86.3 FL (ref 80–100)
MONOCYTES ABSOLUTE: 1 K/UL (ref 0–1.3)
MONOCYTES RELATIVE PERCENT: 12.1 %
NEUTROPHILS ABSOLUTE: 4.7 K/UL (ref 1.7–7.7)
NEUTROPHILS RELATIVE PERCENT: 57.4 %
PDW BLD-RTO: 13.1 % (ref 12.4–15.4)
PLATELET # BLD: 229 K/UL (ref 135–450)
PMV BLD AUTO: 10 FL (ref 5–10.5)
POTASSIUM SERPL-SCNC: 4.2 MMOL/L (ref 3.5–5.1)
PRO-BNP: 270 PG/ML (ref 0–124)
RBC # BLD: 4.34 M/UL (ref 4–5.2)
SODIUM BLD-SCNC: 134 MMOL/L (ref 136–145)
URIC ACID, SERUM: 4.9 MG/DL (ref 2.6–6)
WBC # BLD: 8.1 K/UL (ref 4–11)

## 2021-03-22 PROCEDURE — 83880 ASSAY OF NATRIURETIC PEPTIDE: CPT

## 2021-03-22 PROCEDURE — 84550 ASSAY OF BLOOD/URIC ACID: CPT

## 2021-03-22 PROCEDURE — 85025 COMPLETE CBC W/AUTO DIFF WBC: CPT

## 2021-03-22 PROCEDURE — 80048 BASIC METABOLIC PNL TOTAL CA: CPT

## 2021-03-22 PROCEDURE — 36415 COLL VENOUS BLD VENIPUNCTURE: CPT

## 2021-03-22 NOTE — TELEPHONE ENCOUNTER
[facility]N calling pts weight on 03/17 was 182 lbs today pt is 192 lbs. N ольга labs today for ARETHA and the PCP will be dropping them off at [facility] lab today.  Pls call to advise Thank you

## 2021-03-23 ENCOUNTER — TELEPHONE (OUTPATIENT)
Dept: CARDIOLOGY CLINIC | Age: 51
End: 2021-03-23

## 2021-03-23 NOTE — TELEPHONE ENCOUNTER
----- Message from Naseem Henderson MD sent at 3/22/2021  5:49 PM EDT -----  Labs look good. No changes.  ARETHA

## 2021-03-30 ENCOUNTER — IMMUNIZATION (OUTPATIENT)
Dept: PRIMARY CARE CLINIC | Age: 51
End: 2021-03-30
Payer: COMMERCIAL

## 2021-03-30 PROCEDURE — 91301 COVID-19, MODERNA VACCINE 100MCG/0.5ML DOSE: CPT | Performed by: FAMILY MEDICINE

## 2021-03-30 PROCEDURE — 0012A COVID-19, MODERNA VACCINE 100MCG/0.5ML DOSE: CPT | Performed by: FAMILY MEDICINE

## 2021-03-31 ENCOUNTER — HOSPITAL ENCOUNTER (OUTPATIENT)
Age: 51
Setting detail: SPECIMEN
Discharge: HOME | End: 2021-03-31
Payer: COMMERCIAL

## 2021-03-31 LAB
ANION GAP SERPL CALCULATED.3IONS-SCNC: 13 MMOL/L (ref 3–16)
BUN BLDV-MCNC: 51 MG/DL (ref 7–20)
CALCIUM SERPL-MCNC: 9.3 MG/DL (ref 8.3–10.6)
CHLORIDE BLD-SCNC: 92 MMOL/L (ref 99–110)
CO2: 29 MMOL/L (ref 21–32)
CREAT SERPL-MCNC: 1.3 MG/DL (ref 0.6–1.1)
GFR AFRICAN AMERICAN: 52
GFR NON-AFRICAN AMERICAN: 43
GLUCOSE BLD-MCNC: 321 MG/DL (ref 70–99)
POTASSIUM SERPL-SCNC: 4.6 MMOL/L (ref 3.5–5.1)
PRO-BNP: 152 PG/ML (ref 0–124)
SODIUM BLD-SCNC: 134 MMOL/L (ref 136–145)

## 2021-03-31 PROCEDURE — 83880 ASSAY OF NATRIURETIC PEPTIDE: CPT

## 2021-03-31 PROCEDURE — 80048 BASIC METABOLIC PNL TOTAL CA: CPT

## 2021-04-01 ENCOUNTER — TELEPHONE (OUTPATIENT)
Dept: CARDIOLOGY CLINIC | Age: 51
End: 2021-04-01

## 2021-04-07 ENCOUNTER — HOSPITAL ENCOUNTER (OUTPATIENT)
Age: 51
Setting detail: SPECIMEN
Discharge: HOME | End: 2021-04-07
Payer: COMMERCIAL

## 2021-04-07 LAB
ANION GAP SERPL CALCULATED.3IONS-SCNC: 14 MMOL/L (ref 3–16)
BILIRUBIN URINE: NEGATIVE
BLOOD, URINE: NEGATIVE
BUN BLDV-MCNC: 43 MG/DL (ref 7–20)
CALCIUM SERPL-MCNC: 9.8 MG/DL (ref 8.3–10.6)
CHLORIDE BLD-SCNC: 93 MMOL/L (ref 99–110)
CLARITY: CLEAR
CO2: 27 MMOL/L (ref 21–32)
COLOR: YELLOW
CREAT SERPL-MCNC: 1.3 MG/DL (ref 0.6–1.1)
GFR AFRICAN AMERICAN: 52
GFR NON-AFRICAN AMERICAN: 43
GLUCOSE BLD-MCNC: 312 MG/DL (ref 70–99)
GLUCOSE URINE: >=1000 MG/DL
KETONES, URINE: NEGATIVE MG/DL
LEUKOCYTE ESTERASE, URINE: NEGATIVE
MICROSCOPIC EXAMINATION: ABNORMAL
NITRITE, URINE: NEGATIVE
PH UA: 6.5 (ref 5–8)
POTASSIUM SERPL-SCNC: 4.3 MMOL/L (ref 3.5–5.1)
PRO-BNP: 268 PG/ML (ref 0–124)
PROTEIN UA: NEGATIVE MG/DL
SODIUM BLD-SCNC: 134 MMOL/L (ref 136–145)
SPECIFIC GRAVITY UA: 1.01 (ref 1–1.03)
URINE TYPE: ABNORMAL
UROBILINOGEN, URINE: 0.2 E.U./DL

## 2021-04-07 PROCEDURE — 83880 ASSAY OF NATRIURETIC PEPTIDE: CPT

## 2021-04-07 PROCEDURE — 87086 URINE CULTURE/COLONY COUNT: CPT

## 2021-04-07 PROCEDURE — 36415 COLL VENOUS BLD VENIPUNCTURE: CPT

## 2021-04-07 PROCEDURE — 81003 URINALYSIS AUTO W/O SCOPE: CPT

## 2021-04-07 PROCEDURE — 80048 BASIC METABOLIC PNL TOTAL CA: CPT

## 2021-04-08 LAB — URINE CULTURE, ROUTINE: NORMAL

## 2021-04-13 ENCOUNTER — TELEPHONE (OUTPATIENT)
Dept: CARDIOLOGY CLINIC | Age: 51
End: 2021-04-13

## 2021-04-13 NOTE — TELEPHONE ENCOUNTER
Ms Janet Barboza with [facility] [employer], [facility]. called ask is there a particular day that Dr Maddie Clark would like pt labs drawn?     pls advice thank you   [phone]

## 2021-04-13 NOTE — TELEPHONE ENCOUNTER
Asking for results of last weeks blood tests.  wants ARETHA to send order to [facility] stating that all this patients blood tests should be on mondays so they can get results the same week .

## 2021-04-13 NOTE — TELEPHONE ENCOUNTER
JASON:  I received a call from [facility] before this call. I advised that it's okay to get labs on Monday for results and treatment in the same weeks if necessary.

## 2021-04-14 ENCOUNTER — HOSPITAL ENCOUNTER (OUTPATIENT)
Age: 51
Setting detail: SPECIMEN
Discharge: HOME | End: 2021-04-14
Payer: COMMERCIAL

## 2021-04-14 LAB
ANION GAP SERPL CALCULATED.3IONS-SCNC: 9 MMOL/L (ref 3–16)
BUN BLDV-MCNC: 42 MG/DL (ref 7–20)
CALCIUM SERPL-MCNC: 9.1 MG/DL (ref 8.3–10.6)
CHLORIDE BLD-SCNC: 96 MMOL/L (ref 99–110)
CO2: 32 MMOL/L (ref 21–32)
CREAT SERPL-MCNC: 1.1 MG/DL (ref 0.6–1.1)
GFR AFRICAN AMERICAN: >60
GFR NON-AFRICAN AMERICAN: 52
GLUCOSE BLD-MCNC: 222 MG/DL (ref 70–99)
POTASSIUM SERPL-SCNC: 4.2 MMOL/L (ref 3.5–5.1)
PRO-BNP: 69 PG/ML (ref 0–124)
SODIUM BLD-SCNC: 137 MMOL/L (ref 136–145)
URIC ACID, SERUM: 4.7 MG/DL (ref 2.6–6)

## 2021-04-14 PROCEDURE — 80048 BASIC METABOLIC PNL TOTAL CA: CPT

## 2021-04-14 PROCEDURE — 36415 COLL VENOUS BLD VENIPUNCTURE: CPT

## 2021-04-14 PROCEDURE — 84550 ASSAY OF BLOOD/URIC ACID: CPT

## 2021-04-14 PROCEDURE — 83880 ASSAY OF NATRIURETIC PEPTIDE: CPT

## 2021-04-16 ENCOUNTER — TELEPHONE (OUTPATIENT)
Dept: CARDIOLOGY CLINIC | Age: 51
End: 2021-04-16

## 2021-04-20 ENCOUNTER — HOSPITAL ENCOUNTER (OUTPATIENT)
Age: 51
Setting detail: SPECIMEN
Discharge: HOME | End: 2021-04-20
Payer: COMMERCIAL

## 2021-04-20 LAB
ANION GAP SERPL CALCULATED.3IONS-SCNC: 9 MMOL/L (ref 3–16)
BUN BLDV-MCNC: 46 MG/DL (ref 7–20)
CALCIUM SERPL-MCNC: 9.1 MG/DL (ref 8.3–10.6)
CHLORIDE BLD-SCNC: 98 MMOL/L (ref 99–110)
CO2: 29 MMOL/L (ref 21–32)
CREAT SERPL-MCNC: 1.4 MG/DL (ref 0.6–1.1)
GFR AFRICAN AMERICAN: 48
GFR NON-AFRICAN AMERICAN: 39
GLUCOSE BLD-MCNC: 353 MG/DL (ref 70–99)
POTASSIUM SERPL-SCNC: 4.8 MMOL/L (ref 3.5–5.1)
PRO-BNP: 74 PG/ML (ref 0–124)
SODIUM BLD-SCNC: 136 MMOL/L (ref 136–145)

## 2021-04-20 PROCEDURE — 36415 COLL VENOUS BLD VENIPUNCTURE: CPT

## 2021-04-20 PROCEDURE — 80048 BASIC METABOLIC PNL TOTAL CA: CPT

## 2021-04-20 PROCEDURE — 83880 ASSAY OF NATRIURETIC PEPTIDE: CPT

## 2021-04-23 ENCOUNTER — TELEPHONE (OUTPATIENT)
Dept: INTERNAL MEDICINE CLINIC | Age: 51
End: 2021-04-23

## 2021-04-23 NOTE — TELEPHONE ENCOUNTER
Sunday Denton from [address] [facility] stating that she did an assessment on her for the [facility]. She is approving this. Would like to talk to Dr Tisha Mazariegos.  Please call at [phone]

## 2021-04-23 NOTE — TELEPHONE ENCOUNTER
Bette Balderrama. They will send a  out and they will help with [facility] and anything that they will need. Per Dr Lucius hodges to do. Emelia Foster notified.

## 2021-04-27 ENCOUNTER — HOSPITAL ENCOUNTER (OUTPATIENT)
Age: 51
Discharge: HOME | End: 2021-04-27
Payer: COMMERCIAL

## 2021-04-27 DIAGNOSIS — K21.9 HIATAL HERNIA WITH GERD: ICD-10-CM

## 2021-04-27 DIAGNOSIS — K44.9 HIATAL HERNIA WITH GERD: ICD-10-CM

## 2021-04-27 LAB
ANION GAP SERPL CALCULATED.3IONS-SCNC: 10 MMOL/L (ref 3–16)
BUN BLDV-MCNC: 40 MG/DL (ref 7–20)
CALCIUM SERPL-MCNC: 9.5 MG/DL (ref 8.3–10.6)
CHLORIDE BLD-SCNC: 97 MMOL/L (ref 99–110)
CO2: 31 MMOL/L (ref 21–32)
CREAT SERPL-MCNC: 1.4 MG/DL (ref 0.6–1.1)
GFR AFRICAN AMERICAN: 48
GFR NON-AFRICAN AMERICAN: 39
GLUCOSE BLD-MCNC: 98 MG/DL (ref 70–99)
POTASSIUM SERPL-SCNC: 4.5 MMOL/L (ref 3.5–5.1)
PRO-BNP: 106 PG/ML (ref 0–124)
SODIUM BLD-SCNC: 138 MMOL/L (ref 136–145)

## 2021-04-27 PROCEDURE — 80048 BASIC METABOLIC PNL TOTAL CA: CPT

## 2021-04-27 PROCEDURE — 36415 COLL VENOUS BLD VENIPUNCTURE: CPT

## 2021-04-27 PROCEDURE — 83880 ASSAY OF NATRIURETIC PEPTIDE: CPT

## 2021-04-27 RX ORDER — OMEPRAZOLE 20 MG/1
CAPSULE, DELAYED RELEASE ORAL
Qty: 60 CAPSULE | Refills: 0 | Status: SHIPPED | OUTPATIENT
Start: 2021-04-27 | End: 2021-05-07

## 2021-04-28 ENCOUNTER — TELEPHONE (OUTPATIENT)
Dept: INTERNAL MEDICINE CLINIC | Age: 51
End: 2021-04-28

## 2021-04-28 NOTE — TELEPHONE ENCOUNTER
----- Message from Veronika Prince sent at 4/26/2021  3:39 PM EDT -----  Subject: Appointment Request    Reason for Call: Routine (Patient Request) No Script    QUESTIONS  Type of Appointment? Established Patient  Reason for appointment request? Available appointments did not meet   patient need  Additional Information for Provider? patient is having left side of   stomach swelling but it is soft, patient is having no pain. so her GI   doctor told her to follow up with her PCP, patient would prefer for you to   see her in person if possible.   ---------------------------------------------------------------------------  --------------  CALL BACK INFO  What is the best way for the office to contact you? OK to leave message on   voicemail  Preferred Call Back Phone Number? [phone]  ---------------------------------------------------------------------------  --------------  SCRIPT ANSWERS  Relationship to Patient? Other  Representative Name? /Abdelghani  Additional information verified (besides Name and Date of Birth)? Last 4   SSN  Appointment reason? Symptomatic  Select script based on patient symptoms? Adult No Script   (Is the patient requesting to see the provider for a procedure?)? No  (Is the patient requesting to see the provider urgently  today or   tomorrow. )? No  Have you been diagnosed with, tested for, or told that you are suspected   of having COVID-19 (Coronavirus)? No  Have you had a fever or taken medication to treat a fever within the past   3 days? No  Have you had a cough, shortness of breath or flu-like symptoms within the   past 3 days? No  Do you currently have flu-like symptoms including fever or chills, cough,   shortness of breath, or difficulty breathing, or new loss of taste or   smell? No  (Service Expert  click yes below to proceed with [employer] As Usual   Scheduling)?  Yes

## 2021-05-04 ENCOUNTER — TELEPHONE (OUTPATIENT)
Dept: INTERNAL MEDICINE CLINIC | Age: 51
End: 2021-05-04

## 2021-05-04 ENCOUNTER — HOSPITAL ENCOUNTER (OUTPATIENT)
Age: 51
Setting detail: SPECIMEN
Discharge: HOME | End: 2021-05-04
Payer: COMMERCIAL

## 2021-05-04 ENCOUNTER — NURSE ONLY (OUTPATIENT)
Dept: ENDOCRINOLOGY | Age: 51
End: 2021-05-04

## 2021-05-04 LAB
ANION GAP SERPL CALCULATED.3IONS-SCNC: 12 MMOL/L (ref 3–16)
BUN BLDV-MCNC: 41 MG/DL (ref 7–20)
CALCIUM SERPL-MCNC: 8.9 MG/DL (ref 8.3–10.6)
CHLORIDE BLD-SCNC: 92 MMOL/L (ref 99–110)
CO2: 28 MMOL/L (ref 21–32)
CREAT SERPL-MCNC: 1.5 MG/DL (ref 0.6–1.1)
GFR AFRICAN AMERICAN: 44
GFR NON-AFRICAN AMERICAN: 36
GLUCOSE BLD-MCNC: 420 MG/DL (ref 70–99)
POTASSIUM SERPL-SCNC: 4.1 MMOL/L (ref 3.5–5.1)
PRO-BNP: 110 PG/ML (ref 0–124)
SODIUM BLD-SCNC: 132 MMOL/L (ref 136–145)

## 2021-05-04 PROCEDURE — 36415 COLL VENOUS BLD VENIPUNCTURE: CPT

## 2021-05-04 PROCEDURE — 80048 BASIC METABOLIC PNL TOTAL CA: CPT

## 2021-05-04 PROCEDURE — 83880 ASSAY OF NATRIURETIC PEPTIDE: CPT

## 2021-05-04 NOTE — TELEPHONE ENCOUNTER
Nyasia Moura from [address] is calling for verbal order for recertification on this pt. Please call Nyasia Moura at [phone]. Thanks.

## 2021-05-06 ENCOUNTER — OFFICE VISIT (OUTPATIENT)
Dept: INTERNAL MEDICINE CLINIC | Age: 51
End: 2021-05-06
Payer: COMMERCIAL

## 2021-05-06 VITALS
WEIGHT: 185.6 LBS | OXYGEN SATURATION: 98 % | DIASTOLIC BLOOD PRESSURE: 80 MMHG | SYSTOLIC BLOOD PRESSURE: 122 MMHG | HEART RATE: 76 BPM | BODY MASS INDEX: 36.25 KG/M2

## 2021-05-06 DIAGNOSIS — R39.15 URINARY URGENCY: ICD-10-CM

## 2021-05-06 DIAGNOSIS — R60.0 LEG EDEMA, RIGHT: ICD-10-CM

## 2021-05-06 DIAGNOSIS — R19.04 LEFT LOWER QUADRANT ABDOMINAL WALL MASS: ICD-10-CM

## 2021-05-06 DIAGNOSIS — K44.9 HIATAL HERNIA WITH GERD: ICD-10-CM

## 2021-05-06 DIAGNOSIS — M51.36 DDD (DEGENERATIVE DISC DISEASE), LUMBAR: ICD-10-CM

## 2021-05-06 DIAGNOSIS — R39.11 URINARY HESITANCY: ICD-10-CM

## 2021-05-06 DIAGNOSIS — M06.9 RHEUMATOID ARTHRITIS INVOLVING MULTIPLE SITES, UNSPECIFIED WHETHER RHEUMATOID FACTOR PRESENT (HCC): ICD-10-CM

## 2021-05-06 DIAGNOSIS — M79.7 FIBROMYALGIA: Primary | ICD-10-CM

## 2021-05-06 DIAGNOSIS — M50.30 DDD (DEGENERATIVE DISC DISEASE), CERVICAL: ICD-10-CM

## 2021-05-06 DIAGNOSIS — K21.9 HIATAL HERNIA WITH GERD: ICD-10-CM

## 2021-05-06 PROCEDURE — 99214 OFFICE O/P EST MOD 30 MIN: CPT | Performed by: INTERNAL MEDICINE

## 2021-05-06 PROCEDURE — 1036F TOBACCO NON-USER: CPT | Performed by: INTERNAL MEDICINE

## 2021-05-06 PROCEDURE — 3017F COLORECTAL CA SCREEN DOC REV: CPT | Performed by: INTERNAL MEDICINE

## 2021-05-06 PROCEDURE — G8427 DOCREV CUR MEDS BY ELIG CLIN: HCPCS | Performed by: INTERNAL MEDICINE

## 2021-05-06 PROCEDURE — 81002 URINALYSIS NONAUTO W/O SCOPE: CPT | Performed by: INTERNAL MEDICINE

## 2021-05-06 PROCEDURE — G8417 CALC BMI ABV UP PARAM F/U: HCPCS | Performed by: INTERNAL MEDICINE

## 2021-05-06 RX ORDER — METHOCARBAMOL 750 MG/1
750 TABLET, FILM COATED ORAL 3 TIMES DAILY PRN
Qty: 30 TABLET | Refills: 0 | Status: SHIPPED | OUTPATIENT
Start: 2021-05-06 | End: 2021-07-26 | Stop reason: SDUPTHER

## 2021-05-06 RX ORDER — LIDOCAINE 4 G/G
2 PATCH TOPICAL DAILY
Qty: 60 PATCH | Refills: 5 | Status: SHIPPED | OUTPATIENT
Start: 2021-05-06 | End: 2021-06-05

## 2021-05-06 NOTE — PROGRESS NOTES
Patient: Olive Gomez is a 45 y.o. female who presents today with the following Chief Complaint(s):  Chief Complaint   Patient presents with    Cyst     softball size cyst on her left side     Swelling     left leg and ankle     Neck Pain       HPI     Here today for acute visit. Patient's primary language is German. She does speak some [place] and understand some [place]. Patient and her  always declined interpreters and patient's  serves as  for patient. Patient's  is fluent in [place] and German. Having swelling about the size of a tennis ball in her left side of abdomen. Has been present for about 1 month. Has maybe gotten a little bigger. Is not affected by BM's. Sometimes she feels like she is backed up on urine. Feels like she needs to go but cannot urinate. Also has times where she cannot control her urine. Is new- started about 1 month ago. Last Saturday was in the ED- [facility] Went to the ED and when she woke up her entire left side was hurting and was told that she has arthritis in her neck. Gave her a muscle relaxer that helped. Was told that she needs to f/u with a specialist. Would also like to try the muscle relaxer that was given to her. ED visit reviewed and she was given a Lidoderm patch- did not see muscle relaxer. Previously saw Dr. Keri Piña for injections. Would like to  follow with Dr. Dayron Corral. Oxycodone comes from her onclologist, Dr. Inna Kaba. Is also worried that her right leg has been swelling. Dr. Shara Ward did check a LE Doppler in October that was negative for DVT. No h/o injuries to right leg, no surgeries. Has had Dopplers ordered as far back as 2019.      Allergies   Allergen Reactions    Iv Dye [Iodides] Anaphylaxis     allergic to ct scan dye, not the MRI    Diazepam Other (See Comments)    Trazodone And Nefazodone Other (See Comments)     Makes patient feel very sluggish      Past Medical tobacco: Never Used   Substance and Sexual Activity    Alcohol use: No    Drug use: No    Sexual activity: Not Currently   Lifestyle    Physical activity     Days per week: Not on file     Minutes per session: Not on file    Stress: Not on file   Relationships    Social connections     Talks on phone: Not on file     Gets together: Not on file     Attends Synagogue service: Not on file     Active member of club or organization: Not on file     Attends meetings of clubs or organizations: Not on file     Relationship status: Not on file    Intimate partner violence     Fear of current or ex partner: Not on file     Emotionally abused: Not on file     Physically abused: Not on file     Forced sexual activity: Not on file   Other Topics Concern    Not on file   Social History Narrative    Not on file     Family History   Problem Relation Age of Onset    Asthma Other     Cancer Other     Depression Other     Diabetes Other     Hypertension Other     High Cholesterol Other     Migraines Other     Heart Attack Father 72         of MI    High Blood Pressure Mother     Diabetes Mother         Outpatient Medications Prior to Visit   Medication Sig Dispense Refill    omeprazole (PRILOSEC) 20 MG delayed release capsule TAKE 1 CAPSULE BY MOUTH TWO TIMES A DAY  60 capsule 0    ferrous sulfate (IRON 325) 325 (65 Fe) MG tablet TAKE 1 TABLET BY MOUTH TWO TIMES A DAY WITH MEALS 180 tablet 0    insulin aspart (NOVOLOG FLEXPEN) 100 UNIT/ML injection pen INJECT 50 UNITS INTO THE SKIN THREE TIMES DAILY BEFORE MEALS 15 pen 3    Blood Glucose Monitoring Suppl (ONETOUCH VERIO) w/Device KIT Use to check glucose 4 times daily. 1 kit 0    blood glucose test strips (ONETOUCH VERIO) strip Test 4 times daily.  200 each 5    Insulin Pen Needle (B-D UF III MINI PEN NEEDLES) 31G X 5 MM MISC use five times daily with insulin 200 each 5    Ertugliflozin L-PyroglutamicAc (STEGLATRO) 5 MG TABS TAKE 1 TABLET BY MOUTH EVERY DAY 30 tablet 3    vitamin D3 (CHOLECALCIFEROL) 25 MCG (1000 UT) TABS tablet TAKE 3 TABLETS BY MOUTH ONE TIME A DAY 90 tablet 5    Insulin Degludec (TRESIBA FLEXTOUCH) 200 UNIT/ML SOPN inject 200 units subcutaneously once daily (Patient taking differently: 90 Units 2 times daily inject 200 units subcutaneously once daily) 20 pen 3    metOLazone (ZAROXOLYN) 2.5 MG tablet TAKE 1 TABLET BY MOUTH TWICE A WEEK AS NEEDED FOR WEIGHT OVER 180POUNDS.  DO NOT TAKE 2 DAYS IN A ROW. (Patient taking differently: Take 2.5 mg by mouth Once every five days) 36 tablet 2    montelukast (SINGULAIR) 10 MG tablet TAKE 1 TABLET BY MOUTH EVERY DAY 30 tablet 4    simvastatin (ZOCOR) 20 MG tablet TAKE 1 TABLET BY MOUTH EVERY DAY AT NIGHT 90 tablet 3    metoprolol tartrate (LOPRESSOR) 25 MG tablet TAKE 1 TABLET BY MOUTH TWO TIMES A DAY  180 tablet 3    torsemide (DEMADEX) 100 MG tablet TAKE 1 TABLET BY MOUTH IN THE MORNING AND 1/2 TABLET BY MOUTH IN THE EVENING 135 tablet 3    ranolazine (RANEXA) 500 MG extended release tablet TAKE 1 TABLET BY MOUTH TWO TIMES A DAY  180 tablet 3    senna-docusate (PERICOLACE) 8.6-50 MG per tablet Take 2 tablets by mouth 2 times daily 360 tablet 3    colchicine (COLCRYS) 0.6 MG tablet Take 0.6 mg by mouth daily      senna (SENOKOT) 8.6 MG tablet Take 1 tablet by mouth 2 times daily 60 tablet 11    lubiprostone (AMITIZA) 24 MCG capsule Take 1 capsule by mouth 2 times daily (with meals) 60 capsule 5    butalbital-acetaminophen-caffeine (FIORICET, ESGIC) -40 MG per tablet Take 1 tablet by mouth every 6 hours as needed for Headaches 30 tablet 1    nystatin (MYCOSTATIN) 111942 UNIT/ML suspension Take 5 mLs by mouth 4 times daily 500 mL 1    Magic Mouthwash (MIRACLE MOUTHWASH) Swish and spit 5 mLs 4 times daily as needed for Irritation or Pain 300 mL 2    traZODone (DESYREL) 50 MG tablet Take 1 tablet by mouth nightly Take it on the day of sleep study 1 tablet 0    spironolactone (ALDACTONE) placed to Dr. Raegan Rae at [facility]. Relevant Medications    methocarbamol (ROBAXIN-750) 750 MG tablet    Other Relevant Orders    External Referral To Pain Clinic    DDD (degenerative disc disease), lumbar     Treated with oxycodone from her oncologist.  Previously saw Dr. Padmini Son for injections but is no longer able to see her for insurance restrictions. Referral is been placed to Dr. Raegan Rae at [facility]. Relevant Medications    methocarbamol (ROBAXIN-750) 750 MG tablet    lidocaine 4 % external patch    Other Relevant Orders    External Referral To Pain Clinic    Fibromyalgia - Primary     Follows with rheumatology. On Lyrica 50 mg twice daily and Cymbalta 60 mg twice daily. Relevant Medications    methocarbamol (ROBAXIN-750) 750 MG tablet    Other Relevant Orders    External Referral To Pain Clinic    Left lower quadrant abdominal wall mass      Check CT abdomen and pelvis. ?  Related to insulin injections. Relevant Orders    CT ABDOMEN PELVIS WO CONTRAST Additional Contrast? Radiologist Recommendation    Leg edema, right      Longstanding. Lower extremity Dopplers have been negative. Checking CT of abdomen and pelvis due to left-sided abdominal wall swelling. This will look for obstructing lesions in her right inguinal area as well. Relevant Orders    CT ABDOMEN PELVIS WO CONTRAST Additional Contrast? Radiologist Recommendation    RA (rheumatoid arthritis) ([address]     Follows with rheumatology. Relevant Medications    methocarbamol (ROBAXIN-750) 750 MG tablet    Other Relevant Orders    External Referral To Pain Clinic    Urinary hesitancy      Refer to urology. UA was negative in the office for infection. Relevant Orders    AMANDA Washington MD, Urology, [facility]    POCT Urinalysis no Micro    Urinary urgency      UA negative for infection. Referral placed to urology.          Relevant Orders    AMANDA Washington MD, Urology, [facility] POCT Urinalysis no Micro          Current Outpatient Medications   Medication Sig Dispense Refill    methocarbamol (ROBAXIN-750) 750 MG tablet Take 1 tablet by mouth 3 times daily as needed (pain and spasm) 30 tablet 0    lidocaine 4 % external patch Place 2 patches onto the skin daily On 12 hours/off 12 hours 60 patch 5    ferrous sulfate (IRON 325) 325 (65 Fe) MG tablet TAKE 1 TABLET BY MOUTH TWO TIMES A DAY WITH MEALS 180 tablet 0    insulin aspart (NOVOLOG FLEXPEN) 100 UNIT/ML injection pen INJECT 50 UNITS INTO THE SKIN THREE TIMES DAILY BEFORE MEALS 15 pen 3    Blood Glucose Monitoring Suppl (ONETOUCH VERIO) w/Device KIT Use to check glucose 4 times daily. 1 kit 0    blood glucose test strips (ONETOUCH VERIO) strip Test 4 times daily. 200 each 5    Insulin Pen Needle (B-D UF III MINI PEN NEEDLES) 31G X 5 MM MISC use five times daily with insulin 200 each 5    Ertugliflozin L-PyroglutamicAc (STEGLATRO) 5 MG TABS TAKE 1 TABLET BY MOUTH EVERY DAY 30 tablet 3    vitamin D3 (CHOLECALCIFEROL) 25 MCG (1000 UT) TABS tablet TAKE 3 TABLETS BY MOUTH ONE TIME A DAY 90 tablet 5    Insulin Degludec (TRESIBA FLEXTOUCH) 200 UNIT/ML SOPN inject 200 units subcutaneously once daily (Patient taking differently: 90 Units 2 times daily inject 200 units subcutaneously once daily) 20 pen 3    metOLazone (ZAROXOLYN) 2.5 MG tablet TAKE 1 TABLET BY MOUTH TWICE A WEEK AS NEEDED FOR WEIGHT OVER 180POUNDS.  DO NOT TAKE 2 DAYS IN A ROW. (Patient taking differently: Take 2.5 mg by mouth Once every five days) 36 tablet 2    montelukast (SINGULAIR) 10 MG tablet TAKE 1 TABLET BY MOUTH EVERY DAY 30 tablet 4    simvastatin (ZOCOR) 20 MG tablet TAKE 1 TABLET BY MOUTH EVERY DAY AT NIGHT 90 tablet 3    metoprolol tartrate (LOPRESSOR) 25 MG tablet TAKE 1 TABLET BY MOUTH TWO TIMES A DAY  180 tablet 3    torsemide (DEMADEX) 100 MG tablet TAKE 1 TABLET BY MOUTH IN THE MORNING AND 1/2 TABLET BY MOUTH IN THE EVENING 135 tablet 3    ranolazine (RANEXA) 500 MG extended release tablet TAKE 1 TABLET BY MOUTH TWO TIMES A DAY  180 tablet 3    senna-docusate (PERICOLACE) 8.6-50 MG per tablet Take 2 tablets by mouth 2 times daily 360 tablet 3    colchicine (COLCRYS) 0.6 MG tablet Take 0.6 mg by mouth daily      senna (SENOKOT) 8.6 MG tablet Take 1 tablet by mouth 2 times daily 60 tablet 11    lubiprostone (AMITIZA) 24 MCG capsule Take 1 capsule by mouth 2 times daily (with meals) 60 capsule 5    butalbital-acetaminophen-caffeine (FIORICET, ESGIC) -40 MG per tablet Take 1 tablet by mouth every 6 hours as needed for Headaches 30 tablet 1    nystatin (MYCOSTATIN) 826556 UNIT/ML suspension Take 5 mLs by mouth 4 times daily 500 mL 1    Magic Mouthwash (MIRACLE MOUTHWASH) Swish and spit 5 mLs 4 times daily as needed for Irritation or Pain 300 mL 2    traZODone (DESYREL) 50 MG tablet Take 1 tablet by mouth nightly Take it on the day of sleep study 1 tablet 0    spironolactone (ALDACTONE) 50 MG tablet TAKE 2 TABLETS BY MOUTH IN THE MORNING AND ONE TABLET IN THE EVENING 90 tablet 5    loratadine (CLARITIN) 10 MG tablet TAKE 1 TABLET BY MOUTH ONE TIME A DAY  30 tablet 5    lidocaine viscous hcl (XYLOCAINE) 2 % SOLN solution Take 5 mLs by mouth every 3 hours as needed for Irritation or Pain 100 mL 2    ketoconazole (NIZORAL) 2 % shampoo Apply topically daily as needed. 120 mL 1    nitroGLYCERIN (NITROSTAT) 0.4 MG SL tablet Place 1 tablet under the tongue every 5 minutes as needed for Chest pain up to max of 3 total doses. If no relief after 1 dose, call 911. 25 tablet 3    levothyroxine (SYNTHROID) 150 MCG tablet Take 1 tablet by mouth every morning (before breakfast) 90 tablet 3    diazePAM (VALIUM) 5 MG tablet Take 5 mg by mouth 2 times daily as needed for Anxiety.        cariprazine hcl (VRAYLAR) 1.5 MG capsule Take 3 mg by mouth daily       Febuxostat 80 MG TABS Take 80 mg by mouth daily      pregabalin (LYRICA) 50 MG capsule Take 50

## 2021-05-07 ENCOUNTER — TELEPHONE (OUTPATIENT)
Dept: CARDIOLOGY CLINIC | Age: 51
End: 2021-05-07

## 2021-05-07 RX ORDER — OMEPRAZOLE 20 MG/1
CAPSULE, DELAYED RELEASE ORAL
Qty: 60 CAPSULE | Refills: 0 | Status: SHIPPED | OUTPATIENT
Start: 2021-05-07 | End: 2021-06-24

## 2021-05-09 PROBLEM — M51.36 DDD (DEGENERATIVE DISC DISEASE), LUMBAR: Status: ACTIVE | Noted: 2021-05-09

## 2021-05-09 PROBLEM — R19.04 LEFT LOWER QUADRANT ABDOMINAL WALL MASS: Status: ACTIVE | Noted: 2021-05-09

## 2021-05-09 PROBLEM — M51.369 DDD (DEGENERATIVE DISC DISEASE), LUMBAR: Status: ACTIVE | Noted: 2021-05-09

## 2021-05-09 PROBLEM — R39.15 URINARY URGENCY: Status: ACTIVE | Noted: 2021-05-09

## 2021-05-09 PROBLEM — M50.30 DDD (DEGENERATIVE DISC DISEASE), CERVICAL: Status: ACTIVE | Noted: 2021-05-09

## 2021-05-10 NOTE — ASSESSMENT & PLAN NOTE
Treated with oxycodone from her oncologist.  Previously saw Dr. Lissett Gamboa for injections but is no longer able to see her for insurance restrictions. Referral is been placed to Dr. Amanda Leal at [facility].

## 2021-05-10 NOTE — ASSESSMENT & PLAN NOTE
Longstanding. Lower extremity Dopplers have been negative. Checking CT of abdomen and pelvis due to left-sided abdominal wall swelling. This will look for obstructing lesions in her right inguinal area as well.

## 2021-05-11 ENCOUNTER — HOSPITAL ENCOUNTER (OUTPATIENT)
Age: 51
Discharge: HOME | End: 2021-05-11
Payer: COMMERCIAL

## 2021-05-11 ENCOUNTER — TELEPHONE (OUTPATIENT)
Dept: INTERNAL MEDICINE CLINIC | Age: 51
End: 2021-05-11

## 2021-05-11 DIAGNOSIS — I50.22 SYSTOLIC CHF, CHRONIC (HCC): ICD-10-CM

## 2021-05-11 DIAGNOSIS — I25.10 CORONARY ARTERY DISEASE INVOLVING NATIVE CORONARY ARTERY OF NATIVE HEART WITHOUT ANGINA PECTORIS: Chronic | ICD-10-CM

## 2021-05-11 DIAGNOSIS — I50.30 (HFPEF) HEART FAILURE WITH PRESERVED EJECTION FRACTION (HCC): Chronic | ICD-10-CM

## 2021-05-11 DIAGNOSIS — I10 ESSENTIAL HYPERTENSION: ICD-10-CM

## 2021-05-11 DIAGNOSIS — R06.02 SOB (SHORTNESS OF BREATH): Chronic | ICD-10-CM

## 2021-05-11 DIAGNOSIS — N28.9 RENAL INSUFFICIENCY: Chronic | ICD-10-CM

## 2021-05-11 LAB
ANION GAP SERPL CALCULATED.3IONS-SCNC: 13 MMOL/L (ref 3–16)
BILIRUBIN URINE: NEGATIVE
BLOOD, URINE: NEGATIVE
BUN BLDV-MCNC: 37 MG/DL (ref 7–20)
CALCIUM SERPL-MCNC: 9.4 MG/DL (ref 8.3–10.6)
CHLORIDE BLD-SCNC: 92 MMOL/L (ref 99–110)
CLARITY: CLEAR
CO2: 29 MMOL/L (ref 21–32)
COLOR: YELLOW
CREAT SERPL-MCNC: 1.2 MG/DL (ref 0.6–1.1)
GFR AFRICAN AMERICAN: 57
GFR NON-AFRICAN AMERICAN: 47
GLUCOSE BLD-MCNC: 124 MG/DL (ref 70–99)
GLUCOSE URINE: >=1000 MG/DL
KETONES, URINE: NEGATIVE MG/DL
LEUKOCYTE ESTERASE, URINE: NEGATIVE
MICROSCOPIC EXAMINATION: ABNORMAL
NITRITE, URINE: NEGATIVE
PH UA: 6 (ref 5–8)
POTASSIUM SERPL-SCNC: 4.9 MMOL/L (ref 3.5–5.1)
PRO-BNP: 312 PG/ML (ref 0–124)
PROTEIN UA: NEGATIVE MG/DL
SODIUM BLD-SCNC: 134 MMOL/L (ref 136–145)
SPECIFIC GRAVITY UA: 1.02 (ref 1–1.03)
URINE TYPE: ABNORMAL
UROBILINOGEN, URINE: 0.2 E.U./DL

## 2021-05-11 PROCEDURE — 81003 URINALYSIS AUTO W/O SCOPE: CPT

## 2021-05-11 PROCEDURE — 80048 BASIC METABOLIC PNL TOTAL CA: CPT

## 2021-05-11 PROCEDURE — 83880 ASSAY OF NATRIURETIC PEPTIDE: CPT

## 2021-05-11 PROCEDURE — 87086 URINE CULTURE/COLONY COUNT: CPT

## 2021-05-11 PROCEDURE — 36415 COLL VENOUS BLD VENIPUNCTURE: CPT

## 2021-05-11 RX ORDER — TORSEMIDE 100 MG/1
TABLET ORAL
Qty: 135 TABLET | Refills: 3 | Status: SHIPPED | OUTPATIENT
Start: 2021-05-11 | End: 2022-05-20

## 2021-05-11 RX ORDER — SPIRONOLACTONE 50 MG/1
TABLET, FILM COATED ORAL
Qty: 90 TABLET | Refills: 5 | Status: SHIPPED | OUTPATIENT
Start: 2021-05-11 | End: 2021-08-09 | Stop reason: SDUPTHER

## 2021-05-11 NOTE — TELEPHONE ENCOUNTER
Medication Refill    Medication needing refilled: torsemide (DEMADEX) 100 MG tablet, spironolactone (ALDACTONE) 50 MG tablet     Dosage of the medication:    How are you taking this medication (QD, BID, TID, QID, PRN):    30 or 90 day supply called in: 90    When will you run out of your medication: PT IS Yuval [address] 89 are we sending the medication to?: [address], [address] [facility] [phone] Enriqueneta Large [phone]   [facility], [address] [place]   Phone:  [phone]  Fax:  [phone]

## 2021-05-11 NOTE — TELEPHONE ENCOUNTER
Nelli with [facility] called to see if the patient can get tested for a UTI. Patient is having burning when urination. Beckie Bautista would like a call backing seeing if [address] can check or not.  [phone]

## 2021-05-12 ENCOUNTER — TELEPHONE (OUTPATIENT)
Dept: INTERNAL MEDICINE CLINIC | Age: 51
End: 2021-05-12

## 2021-05-12 LAB — URINE CULTURE, ROUTINE: NORMAL

## 2021-05-13 RX ORDER — SPIRONOLACTONE 50 MG/1
TABLET, FILM COATED ORAL
Qty: 270 TABLET | Refills: 3 | Status: SHIPPED | OUTPATIENT
Start: 2021-05-13 | End: 2021-10-08 | Stop reason: SDUPTHER

## 2021-05-18 ENCOUNTER — HOSPITAL ENCOUNTER (OUTPATIENT)
Age: 51
Setting detail: SPECIMEN
Discharge: HOME | End: 2021-05-18
Payer: COMMERCIAL

## 2021-05-18 LAB
ANION GAP SERPL CALCULATED.3IONS-SCNC: 10 MMOL/L (ref 3–16)
BUN BLDV-MCNC: 27 MG/DL (ref 7–20)
CALCIUM SERPL-MCNC: 9.5 MG/DL (ref 8.3–10.6)
CHLORIDE BLD-SCNC: 99 MMOL/L (ref 99–110)
CO2: 28 MMOL/L (ref 21–32)
CREAT SERPL-MCNC: 1.1 MG/DL (ref 0.6–1.1)
GFR AFRICAN AMERICAN: >60
GFR NON-AFRICAN AMERICAN: 52
GLUCOSE BLD-MCNC: 37 MG/DL (ref 70–99)
POTASSIUM SERPL-SCNC: 4.4 MMOL/L (ref 3.5–5.1)
PRO-BNP: 704 PG/ML (ref 0–124)
SODIUM BLD-SCNC: 137 MMOL/L (ref 136–145)

## 2021-05-18 PROCEDURE — 36415 COLL VENOUS BLD VENIPUNCTURE: CPT

## 2021-05-18 PROCEDURE — 80048 BASIC METABOLIC PNL TOTAL CA: CPT

## 2021-05-18 PROCEDURE — 83880 ASSAY OF NATRIURETIC PEPTIDE: CPT

## 2021-05-24 ENCOUNTER — TELEPHONE (OUTPATIENT)
Dept: CARDIOLOGY CLINIC | Age: 51
End: 2021-05-24

## 2021-05-24 ENCOUNTER — HOSPITAL ENCOUNTER (OUTPATIENT)
Dept: CT IMAGING | Age: 51
Discharge: HOME | End: 2021-05-24
Payer: COMMERCIAL

## 2021-05-24 DIAGNOSIS — R19.04 LEFT LOWER QUADRANT ABDOMINAL WALL MASS: ICD-10-CM

## 2021-05-24 DIAGNOSIS — R60.0 LEG EDEMA, RIGHT: ICD-10-CM

## 2021-05-24 PROCEDURE — 6360000004 HC RX CONTRAST MEDICATION: Performed by: INTERNAL MEDICINE

## 2021-05-24 PROCEDURE — 74176 CT ABD & PELVIS W/O CONTRAST: CPT

## 2021-05-24 RX ADMIN — IOHEXOL 50 ML: 240 INJECTION, SOLUTION INTRATHECAL; INTRAVASCULAR; INTRAVENOUS; ORAL at 18:19

## 2021-05-24 NOTE — TELEPHONE ENCOUNTER
called back. I spoke with  and relayed message per ARETHA. He stated that pt has had a 7 lb weight gain in 1 day. He also stated that she has swelling and SOB.

## 2021-05-24 NOTE — TELEPHONE ENCOUNTER
----- Message from Mari Gutierrez MD sent at 5/22/2021  9:38 PM EDT -----  Please call patient. Her bnp is a lot higher than normal.  Has she gained weight? We need to repeat labs this week.   ARETHA

## 2021-05-24 NOTE — TELEPHONE ENCOUNTER
----- Message from Ethan Manuel MD sent at 5/22/2021  9:38 PM EDT -----  Please call patient. Her bnp is a lot higher than normal.  Has she gained weight? We need to repeat labs this week.   ARETHA

## 2021-05-24 NOTE — TELEPHONE ENCOUNTER
----- Message from Demi Engle MD sent at 5/22/2021  9:38 PM EDT -----  Please call patient. Her bnp is a lot higher than normal.  Has she gained weight? We need to repeat labs this week.   ARETHA

## 2021-05-25 ENCOUNTER — HOSPITAL ENCOUNTER (OUTPATIENT)
Age: 51
Setting detail: SPECIMEN
Discharge: HOME | End: 2021-05-25
Payer: COMMERCIAL

## 2021-05-25 ENCOUNTER — TELEPHONE (OUTPATIENT)
Dept: INTERNAL MEDICINE CLINIC | Age: 51
End: 2021-05-25

## 2021-05-25 LAB
ANION GAP SERPL CALCULATED.3IONS-SCNC: 14 MMOL/L (ref 3–16)
BUN BLDV-MCNC: 51 MG/DL (ref 7–20)
CALCIUM SERPL-MCNC: 9.6 MG/DL (ref 8.3–10.6)
CHLORIDE BLD-SCNC: 92 MMOL/L (ref 99–110)
CO2: 29 MMOL/L (ref 21–32)
CREAT SERPL-MCNC: 1.5 MG/DL (ref 0.6–1.1)
GFR AFRICAN AMERICAN: 44
GFR NON-AFRICAN AMERICAN: 36
GLUCOSE BLD-MCNC: 106 MG/DL (ref 70–99)
POTASSIUM SERPL-SCNC: 4.6 MMOL/L (ref 3.5–5.1)
PRO-BNP: 349 PG/ML (ref 0–124)
SODIUM BLD-SCNC: 135 MMOL/L (ref 136–145)

## 2021-05-25 PROCEDURE — 80048 BASIC METABOLIC PNL TOTAL CA: CPT

## 2021-05-25 PROCEDURE — 36415 COLL VENOUS BLD VENIPUNCTURE: CPT

## 2021-05-25 PROCEDURE — 83880 ASSAY OF NATRIURETIC PEPTIDE: CPT

## 2021-05-25 NOTE — TELEPHONE ENCOUNTER
Pt  calling---wanting to see if you would order Arava 10 mg 1 a day for the pt---is out and has not been able to get a hold of her Rheum. Wants to talk to you about this---please call the . Thanks.

## 2021-05-25 NOTE — TELEPHONE ENCOUNTER
Called pt's rheum and they are working on the medication for the pt. Pt's  notified.  Pt's  is not satisfied with rheum and would like a referral to one of our rheum

## 2021-05-28 ENCOUNTER — TELEPHONE (OUTPATIENT)
Dept: INTERNAL MEDICINE CLINIC | Age: 51
End: 2021-05-28

## 2021-05-28 DIAGNOSIS — M06.9 RHEUMATOID ARTHRITIS INVOLVING MULTIPLE SITES, UNSPECIFIED WHETHER RHEUMATOID FACTOR PRESENT (HCC): ICD-10-CM

## 2021-05-28 DIAGNOSIS — M79.7 FIBROMYALGIA: Primary | ICD-10-CM

## 2021-05-28 NOTE — TELEPHONE ENCOUNTER
Patient would like to know her MRI results and also would like  to put in a referral for rheumatology.

## 2021-05-28 NOTE — TELEPHONE ENCOUNTER
CT of abdomen and pelvis looked ok other than constipation which is likely what is causing her swelling and discomfort in her abdomen.      Referral placed to [facility].

## 2021-06-01 ENCOUNTER — HOSPITAL ENCOUNTER (OUTPATIENT)
Age: 51
Setting detail: SPECIMEN
Discharge: HOME | End: 2021-06-01
Payer: COMMERCIAL

## 2021-06-01 DIAGNOSIS — D50.9 IRON DEFICIENCY ANEMIA, UNSPECIFIED IRON DEFICIENCY ANEMIA TYPE: ICD-10-CM

## 2021-06-01 LAB
ALBUMIN SERPL-MCNC: 4.2 G/DL (ref 3.4–5)
ALP BLD-CCNC: 149 U/L (ref 40–129)
ALT SERPL-CCNC: 12 U/L (ref 10–40)
ANION GAP SERPL CALCULATED.3IONS-SCNC: 12 MMOL/L (ref 3–16)
AST SERPL-CCNC: 15 U/L (ref 15–37)
BASOPHILS ABSOLUTE: 0 K/UL (ref 0–0.2)
BASOPHILS RELATIVE PERCENT: 0.6 %
BILIRUB SERPL-MCNC: <0.2 MG/DL (ref 0–1)
BILIRUBIN DIRECT: <0.2 MG/DL (ref 0–0.3)
BILIRUBIN, INDIRECT: ABNORMAL MG/DL (ref 0–1)
BUN BLDV-MCNC: 51 MG/DL (ref 7–20)
C-REACTIVE PROTEIN: 7.6 MG/L (ref 0–5.1)
CALCIUM SERPL-MCNC: 9.7 MG/DL (ref 8.3–10.6)
CHLORIDE BLD-SCNC: 86 MMOL/L (ref 99–110)
CO2: 32 MMOL/L (ref 21–32)
CREAT SERPL-MCNC: 1.3 MG/DL (ref 0.6–1.1)
EOSINOPHILS ABSOLUTE: 0.1 K/UL (ref 0–0.6)
EOSINOPHILS RELATIVE PERCENT: 1.8 %
GFR AFRICAN AMERICAN: 52
GFR NON-AFRICAN AMERICAN: 43
GLUCOSE BLD-MCNC: 340 MG/DL (ref 70–99)
HCT VFR BLD CALC: 38.7 % (ref 36–48)
HEMOGLOBIN: 12.5 G/DL (ref 12–16)
LYMPHOCYTES ABSOLUTE: 1.9 K/UL (ref 1–5.1)
LYMPHOCYTES RELATIVE PERCENT: 27.8 %
MCH RBC QN AUTO: 28.9 PG (ref 26–34)
MCHC RBC AUTO-ENTMCNC: 32.4 G/DL (ref 31–36)
MCV RBC AUTO: 89.2 FL (ref 80–100)
MONOCYTES ABSOLUTE: 0.5 K/UL (ref 0–1.3)
MONOCYTES RELATIVE PERCENT: 8 %
NEUTROPHILS ABSOLUTE: 4.2 K/UL (ref 1.7–7.7)
NEUTROPHILS RELATIVE PERCENT: 61.8 %
PDW BLD-RTO: 14.3 % (ref 12.4–15.4)
PLATELET # BLD: 311 K/UL (ref 135–450)
PMV BLD AUTO: 9.6 FL (ref 5–10.5)
POTASSIUM SERPL-SCNC: 4.7 MMOL/L (ref 3.5–5.1)
PRO-BNP: 88 PG/ML (ref 0–124)
RBC # BLD: 4.34 M/UL (ref 4–5.2)
SEDIMENTATION RATE, ERYTHROCYTE: 112 MM/HR (ref 0–30)
SODIUM BLD-SCNC: 130 MMOL/L (ref 136–145)
TOTAL PROTEIN: 7.9 G/DL (ref 6.4–8.2)
URIC ACID, SERUM: 4.4 MG/DL (ref 2.6–6)
WBC # BLD: 6.8 K/UL (ref 4–11)

## 2021-06-01 PROCEDURE — 84550 ASSAY OF BLOOD/URIC ACID: CPT

## 2021-06-01 PROCEDURE — 85025 COMPLETE CBC W/AUTO DIFF WBC: CPT

## 2021-06-01 PROCEDURE — 83880 ASSAY OF NATRIURETIC PEPTIDE: CPT

## 2021-06-01 PROCEDURE — 36415 COLL VENOUS BLD VENIPUNCTURE: CPT

## 2021-06-01 PROCEDURE — 86140 C-REACTIVE PROTEIN: CPT

## 2021-06-01 PROCEDURE — 85652 RBC SED RATE AUTOMATED: CPT

## 2021-06-01 PROCEDURE — 80076 HEPATIC FUNCTION PANEL: CPT

## 2021-06-01 PROCEDURE — 80048 BASIC METABOLIC PNL TOTAL CA: CPT

## 2021-06-01 RX ORDER — FERROUS SULFATE 325(65) MG
TABLET ORAL
Qty: 180 TABLET | Refills: 0 | Status: SHIPPED | OUTPATIENT
Start: 2021-06-01 | End: 2021-08-19

## 2021-06-08 ENCOUNTER — HOSPITAL ENCOUNTER (OUTPATIENT)
Age: 51
Setting detail: SPECIMEN
Discharge: HOME | End: 2021-06-08
Payer: COMMERCIAL

## 2021-06-08 LAB
ANION GAP SERPL CALCULATED.3IONS-SCNC: 12 MMOL/L (ref 3–16)
BUN BLDV-MCNC: 65 MG/DL (ref 7–20)
CALCIUM SERPL-MCNC: 9.3 MG/DL (ref 8.3–10.6)
CHLORIDE BLD-SCNC: 94 MMOL/L (ref 99–110)
CO2: 31 MMOL/L (ref 21–32)
CREAT SERPL-MCNC: 1.6 MG/DL (ref 0.6–1.1)
GFR AFRICAN AMERICAN: 41
GFR NON-AFRICAN AMERICAN: 34
GLUCOSE BLD-MCNC: 223 MG/DL (ref 70–99)
POTASSIUM SERPL-SCNC: 4.2 MMOL/L (ref 3.5–5.1)
PRO-BNP: 125 PG/ML (ref 0–124)
SODIUM BLD-SCNC: 137 MMOL/L (ref 136–145)

## 2021-06-08 PROCEDURE — 80048 BASIC METABOLIC PNL TOTAL CA: CPT

## 2021-06-08 PROCEDURE — 83880 ASSAY OF NATRIURETIC PEPTIDE: CPT

## 2021-06-08 PROCEDURE — 36415 COLL VENOUS BLD VENIPUNCTURE: CPT

## 2021-06-08 RX ORDER — BLOOD-GLUCOSE METER
EACH MISCELLANEOUS
Qty: 1 KIT | Refills: 0 | Status: SHIPPED | OUTPATIENT
Start: 2021-06-08 | End: 2022-07-12 | Stop reason: SDUPTHER

## 2021-06-15 ENCOUNTER — HOSPITAL ENCOUNTER (OUTPATIENT)
Age: 51
Setting detail: SPECIMEN
Discharge: HOME | End: 2021-06-15
Payer: COMMERCIAL

## 2021-06-15 LAB
ANION GAP SERPL CALCULATED.3IONS-SCNC: 9 MMOL/L (ref 3–16)
BUN BLDV-MCNC: 59 MG/DL (ref 7–20)
CALCIUM SERPL-MCNC: 9.4 MG/DL (ref 8.3–10.6)
CHLORIDE BLD-SCNC: 93 MMOL/L (ref 99–110)
CO2: 31 MMOL/L (ref 21–32)
CREAT SERPL-MCNC: 1.9 MG/DL (ref 0.6–1.1)
GFR AFRICAN AMERICAN: 34
GFR NON-AFRICAN AMERICAN: 28
GLUCOSE BLD-MCNC: 499 MG/DL (ref 70–99)
POTASSIUM SERPL-SCNC: 5.3 MMOL/L (ref 3.5–5.1)
PRO-BNP: 100 PG/ML (ref 0–124)
SODIUM BLD-SCNC: 133 MMOL/L (ref 136–145)

## 2021-06-15 PROCEDURE — 83880 ASSAY OF NATRIURETIC PEPTIDE: CPT

## 2021-06-15 PROCEDURE — 80048 BASIC METABOLIC PNL TOTAL CA: CPT

## 2021-06-15 PROCEDURE — 36415 COLL VENOUS BLD VENIPUNCTURE: CPT

## 2021-06-16 ENCOUNTER — TELEPHONE (OUTPATIENT)
Dept: INTERNAL MEDICINE CLINIC | Age: 51
End: 2021-06-16

## 2021-06-18 ENCOUNTER — TELEPHONE (OUTPATIENT)
Dept: CARDIOLOGY CLINIC | Age: 51
End: 2021-06-18

## 2021-06-18 RX ORDER — NITROGLYCERIN 0.4 MG/1
0.4 TABLET SUBLINGUAL EVERY 5 MIN PRN
Qty: 25 TABLET | Refills: 3 | OUTPATIENT
Start: 2021-06-18

## 2021-06-18 RX ORDER — NITROGLYCERIN 0.4 MG/1
TABLET SUBLINGUAL
Qty: 25 TABLET | Refills: 0 | Status: SHIPPED | OUTPATIENT
Start: 2021-06-18 | End: 2022-10-03 | Stop reason: SDUPTHER

## 2021-06-18 NOTE — TELEPHONE ENCOUNTER
Spoke with the patients  and he states that the patients weight is up. She currently weighs 192 lbs . He states that she is swelling all over. Her swelling is in her hands, right leg, and in her stomach.  states she is too heavy and has been having increased SOB . Her O2 sats on RA are 87 %.   He would like to know if she should needs a script for Lasix

## 2021-06-18 NOTE — TELEPHONE ENCOUNTER
Do they want to give lasix at home IV or come to the infusion center? If at home, give 120 mg IV x 1. Same order for infusion center, but couldn't set it up until Monday.       ARETHA

## 2021-06-21 ENCOUNTER — TELEPHONE (OUTPATIENT)
Dept: CARDIOLOGY CLINIC | Age: 51
End: 2021-06-21

## 2021-06-21 NOTE — TELEPHONE ENCOUNTER
Spoke with [facility] and they advised pt would prefer in home infustion.     Order sent to [facility] per ARETHA instruction

## 2021-06-21 NOTE — TELEPHONE ENCOUNTER
[facility] calling, pt has had a 5.6 lb weight gain overnight. Yesterday pt was at 186 lbs, today was 191.6 lbs and on 06/19 was 195 lbs. RN is going out to draw labs tomorrow.  did not administer dieretic today based on kidney kidney function. States SOB/Swelling/energy level is at baseline. Need to know what to do?  Pls call to advise Thank you

## 2021-06-21 NOTE — TELEPHONE ENCOUNTER
I spoke with assiciate- [facility] wanting to know when Lasix is to be adninistered. It was ordered today  Due to pt symptoms. I told them ASAP.

## 2021-06-22 ENCOUNTER — TELEPHONE (OUTPATIENT)
Dept: CARDIOLOGY CLINIC | Age: 51
End: 2021-06-22

## 2021-06-22 NOTE — TELEPHONE ENCOUNTER
[facility]   Pharmacy calling needs a PRN order for IV Lasix for more than one does.  pls call to advise thank you

## 2021-06-22 NOTE — TELEPHONE ENCOUNTER
Sp[facility] with [facility]. He states that [facility] is a specialty pharmacy and doesn't like to write from one dose only.

## 2021-06-23 ENCOUNTER — TELEPHONE (OUTPATIENT)
Dept: CARDIOLOGY CLINIC | Age: 51
End: 2021-06-23

## 2021-06-23 ENCOUNTER — HOSPITAL ENCOUNTER (OUTPATIENT)
Age: 51
Setting detail: SPECIMEN
Discharge: HOME | End: 2021-06-23
Payer: COMMERCIAL

## 2021-06-23 LAB
ANION GAP SERPL CALCULATED.3IONS-SCNC: 8 MMOL/L (ref 3–16)
BUN BLDV-MCNC: 47 MG/DL (ref 7–20)
CALCIUM SERPL-MCNC: 9.2 MG/DL (ref 8.3–10.6)
CHLORIDE BLD-SCNC: 94 MMOL/L (ref 99–110)
CO2: 35 MMOL/L (ref 21–32)
CREAT SERPL-MCNC: 1.4 MG/DL (ref 0.6–1.1)
GFR AFRICAN AMERICAN: 48
GFR NON-AFRICAN AMERICAN: 39
GLUCOSE BLD-MCNC: 169 MG/DL (ref 70–99)
POTASSIUM SERPL-SCNC: 4.6 MMOL/L (ref 3.5–5.1)
PRO-BNP: 167 PG/ML (ref 0–124)
SODIUM BLD-SCNC: 137 MMOL/L (ref 136–145)

## 2021-06-23 PROCEDURE — 36415 COLL VENOUS BLD VENIPUNCTURE: CPT

## 2021-06-23 PROCEDURE — 83880 ASSAY OF NATRIURETIC PEPTIDE: CPT

## 2021-06-23 PROCEDURE — 80048 BASIC METABOLIC PNL TOTAL CA: CPT

## 2021-06-23 NOTE — TELEPHONE ENCOUNTER
Home care nancy was able to get labs today, but unable to get IV for the lasix. Home care with pt now. Please call to advise.

## 2021-06-23 NOTE — TELEPHONE ENCOUNTER
We tried to do the home infusion treatment. Obviously it is not working for a variety of reasons. We need to let Crow and her  know that we can't order this at home anymore, because there are too many issues with coverage and access.,  I suggest she be set up in the infusion center for IV lasix 120 mg IV x 1 (but maybe we should see the labs from today before moving forward on that).     ARETHA

## 2021-06-23 NOTE — TELEPHONE ENCOUNTER
We received the forms and Dr Breonna Holt will be signing them this week. Mali Barrientos has been notified.

## 2021-06-23 NOTE — TELEPHONE ENCOUNTER
I spoke with Louise Chi and she stated that she was unable to use the vessel where she got the blood. Unsure of what to do . She mentioned IM?

## 2021-06-24 ENCOUNTER — TELEPHONE (OUTPATIENT)
Dept: CARDIOLOGY CLINIC | Age: 51
End: 2021-06-24

## 2021-06-24 DIAGNOSIS — K44.9 HIATAL HERNIA WITH GERD: ICD-10-CM

## 2021-06-24 DIAGNOSIS — K21.9 HIATAL HERNIA WITH GERD: ICD-10-CM

## 2021-06-24 RX ORDER — OMEPRAZOLE 20 MG/1
CAPSULE, DELAYED RELEASE ORAL
Qty: 60 CAPSULE | Refills: 0 | Status: SHIPPED | OUTPATIENT
Start: 2021-06-24 | End: 2021-06-30

## 2021-06-24 NOTE — TELEPHONE ENCOUNTER
Referred Aristeo to [facility] [employer]. Unsure as to whether [facility] could gain access or not based on this phone call. I spoke with pt's  yesterday while hhrn was at the home. She stated she was able to gain access for IV Lasix infusion on the second try.

## 2021-06-28 ENCOUNTER — TELEPHONE (OUTPATIENT)
Dept: CARDIOLOGY CLINIC | Age: 51
End: 2021-06-28

## 2021-06-28 NOTE — TELEPHONE ENCOUNTER
No, her heart problems are long standing. And the issues with the vaccine and the heart is a temporary inflammation, completelly different from what she has.   ARETHA

## 2021-06-28 NOTE — TELEPHONE ENCOUNTER
I discussed Dr. Raegan Taylor message with . Jn Portillo. He asked about her recent labs which I went over with him (renal function better although not normal). He said she urinates \"slowly. \" Her weight has been around 190#. No symptoms reported.

## 2021-06-28 NOTE — TELEPHONE ENCOUNTER
calling because he heard that the moderna covid vaccine causes heart damage . Does ARETHA think this patients problems are due to that covid vaccine ?

## 2021-06-29 ENCOUNTER — HOSPITAL ENCOUNTER (OUTPATIENT)
Age: 51
Setting detail: SPECIMEN
Discharge: HOME | End: 2021-06-29
Payer: COMMERCIAL

## 2021-06-29 LAB
ANION GAP SERPL CALCULATED.3IONS-SCNC: 12 MMOL/L (ref 3–16)
BUN BLDV-MCNC: 48 MG/DL (ref 7–20)
CALCIUM SERPL-MCNC: 9.1 MG/DL (ref 8.3–10.6)
CHLORIDE BLD-SCNC: 88 MMOL/L (ref 99–110)
CO2: 31 MMOL/L (ref 21–32)
CREAT SERPL-MCNC: 1.5 MG/DL (ref 0.6–1.1)
GFR AFRICAN AMERICAN: 44
GFR NON-AFRICAN AMERICAN: 36
GLUCOSE BLD-MCNC: 540 MG/DL (ref 70–99)
POTASSIUM SERPL-SCNC: 5.8 MMOL/L (ref 3.5–5.1)
PRO-BNP: 207 PG/ML (ref 0–124)
SODIUM BLD-SCNC: 131 MMOL/L (ref 136–145)

## 2021-06-30 ENCOUNTER — TELEPHONE (OUTPATIENT)
Dept: CARDIOLOGY CLINIC | Age: 51
End: 2021-06-30

## 2021-06-30 NOTE — TELEPHONE ENCOUNTER
----- Message from Gayla Torres MD sent at 6/29/2021 10:43 PM EDT -----  Call patient. Labs are okay, except potassium is high. Reduce spironolactone to 50 mg a day, no second dose.   ARETHA

## 2021-07-02 RX ORDER — MONTELUKAST SODIUM 10 MG/1
TABLET ORAL
Qty: 30 TABLET | Refills: 0 | Status: SHIPPED | OUTPATIENT
Start: 2021-07-02 | End: 2021-08-12

## 2021-07-06 ENCOUNTER — HOSPITAL ENCOUNTER (OUTPATIENT)
Age: 51
Setting detail: SPECIMEN
Discharge: HOME | End: 2021-07-06
Payer: COMMERCIAL

## 2021-07-06 ENCOUNTER — TELEPHONE (OUTPATIENT)
Dept: CARDIOLOGY CLINIC | Age: 51
End: 2021-07-06

## 2021-07-06 ENCOUNTER — TELEPHONE (OUTPATIENT)
Dept: INTERNAL MEDICINE CLINIC | Age: 51
End: 2021-07-06

## 2021-07-06 LAB
ANION GAP SERPL CALCULATED.3IONS-SCNC: 11 MMOL/L (ref 3–16)
BUN BLDV-MCNC: 54 MG/DL (ref 7–20)
CALCIUM SERPL-MCNC: 9.3 MG/DL (ref 8.3–10.6)
CHLORIDE BLD-SCNC: 92 MMOL/L (ref 99–110)
CO2: 33 MMOL/L (ref 21–32)
CREAT SERPL-MCNC: 1.5 MG/DL (ref 0.6–1.1)
GFR AFRICAN AMERICAN: 44
GFR NON-AFRICAN AMERICAN: 36
GLUCOSE BLD-MCNC: 270 MG/DL (ref 70–99)
POTASSIUM SERPL-SCNC: 4 MMOL/L (ref 3.5–5.1)
PRO-BNP: 159 PG/ML (ref 0–124)
SODIUM BLD-SCNC: 136 MMOL/L (ref 136–145)

## 2021-07-06 PROCEDURE — 80048 BASIC METABOLIC PNL TOTAL CA: CPT

## 2021-07-06 PROCEDURE — 83880 ASSAY OF NATRIURETIC PEPTIDE: CPT

## 2021-07-06 PROCEDURE — 36415 COLL VENOUS BLD VENIPUNCTURE: CPT

## 2021-07-06 NOTE — TELEPHONE ENCOUNTER
[address] calling needing a Verbal ok to continue homecare 1 a week for 60 days.        Please call and advise

## 2021-07-06 NOTE — TELEPHONE ENCOUNTER
It looks as though she has been getting the labs about once a week over the last few months. I see no standing order in Epic as I believe the initial orders were called into [facility] as a verbal order. VV on 2/18/21 said q2week labs. Does she continue with weekly labs & for how long? Next OV is 8/9/21 in the office. Thank you.

## 2021-07-06 NOTE — TELEPHONE ENCOUNTER
[facility] calling they need to know if they are to continue drawing weekly BMP and BNP's on pt and for what duration will this be being done? They are going to re-certify her. They did a Lasix 120 push on her last week. [facility] asking if they should draw again today, they can't get anyone out there the rest of this week? No one answered in the MA Room so I told her to go ahead an draw the labs since no one can go out after today this week.  Pls call to advise Thank you

## 2021-07-07 NOTE — TELEPHONE ENCOUNTER
I notified Renita Swenson [facility] nurse) at [phone]. She will continue BMP/BNP [id number] for a 3rd period of 60 days.

## 2021-07-08 ENCOUNTER — TELEPHONE (OUTPATIENT)
Dept: CARDIOLOGY CLINIC | Age: 51
End: 2021-07-08

## 2021-07-08 NOTE — TELEPHONE ENCOUNTER
states pt had labs done on Tues 7/6 asking for results. Asking if  pt should resume spironolactone or remain off it. Please call to advise.

## 2021-07-12 ENCOUNTER — TELEPHONE (OUTPATIENT)
Dept: CARDIOLOGY CLINIC | Age: 51
End: 2021-07-12

## 2021-07-12 ENCOUNTER — HOSPITAL ENCOUNTER (OUTPATIENT)
Dept: MRI IMAGING | Age: 51
Discharge: HOME | End: 2021-07-12
Payer: COMMERCIAL

## 2021-07-12 DIAGNOSIS — C50.812 MALIGNANT NEOPLASM OF OVERLAPPING SITES OF LEFT FEMALE BREAST, UNSPECIFIED ESTROGEN RECEPTOR STATUS (HCC): ICD-10-CM

## 2021-07-12 DIAGNOSIS — R29.6 FREQUENT FALLS: ICD-10-CM

## 2021-07-12 PROCEDURE — 2580000003 HC RX 258: Performed by: INTERNAL MEDICINE

## 2021-07-12 PROCEDURE — 6360000004 HC RX CONTRAST MEDICATION: Performed by: INTERNAL MEDICINE

## 2021-07-12 PROCEDURE — 70553 MRI BRAIN STEM W/O & W/DYE: CPT

## 2021-07-12 PROCEDURE — A9577 INJ MULTIHANCE: HCPCS | Performed by: INTERNAL MEDICINE

## 2021-07-12 RX ORDER — SODIUM CHLORIDE 0.9 % (FLUSH) 0.9 %
10 SYRINGE (ML) INJECTION ONCE
Status: COMPLETED | OUTPATIENT
Start: 2021-07-12 | End: 2021-07-12

## 2021-07-12 RX ADMIN — GADOBENATE DIMEGLUMINE 9 ML: 529 INJECTION, SOLUTION INTRAVENOUS at 17:46

## 2021-07-12 RX ADMIN — Medication 10 ML: at 17:46

## 2021-07-12 NOTE — TELEPHONE ENCOUNTER
[facility] calling,  called in with a weight gain on pt. On 07/08 pt was 191 lbs and today 200.4 lbs,  states pt fell yesterday due to dizziness and had chest pain used Nitro and that seemed to relieve it. Need to know what to do?  Pls call to advise Thank you

## 2021-07-13 ENCOUNTER — TELEPHONE (OUTPATIENT)
Dept: ENDOCRINOLOGY | Age: 51
End: 2021-07-13

## 2021-07-13 ENCOUNTER — TELEPHONE (OUTPATIENT)
Dept: INTERNAL MEDICINE CLINIC | Age: 51
End: 2021-07-13

## 2021-07-13 ENCOUNTER — HOSPITAL ENCOUNTER (OUTPATIENT)
Age: 51
Setting detail: SPECIMEN
Discharge: HOME | End: 2021-07-13
Payer: COMMERCIAL

## 2021-07-13 LAB
BILIRUBIN URINE: NEGATIVE
BLOOD, URINE: NEGATIVE
CLARITY: CLEAR
COLOR: YELLOW
GLUCOSE URINE: >=1000 MG/DL
KETONES, URINE: NEGATIVE MG/DL
LEUKOCYTE ESTERASE, URINE: NEGATIVE
MICROSCOPIC EXAMINATION: ABNORMAL
NITRITE, URINE: NEGATIVE
PH UA: 7 (ref 5–8)
PROTEIN UA: NEGATIVE MG/DL
SPECIFIC GRAVITY UA: 1.01 (ref 1–1.03)
URINE TYPE: ABNORMAL
UROBILINOGEN, URINE: 0.2 E.U./DL

## 2021-07-13 PROCEDURE — 87077 CULTURE AEROBIC IDENTIFY: CPT

## 2021-07-13 PROCEDURE — 81003 URINALYSIS AUTO W/O SCOPE: CPT

## 2021-07-13 PROCEDURE — 87086 URINE CULTURE/COLONY COUNT: CPT

## 2021-07-13 PROCEDURE — 87186 SC STD MICRODIL/AGAR DIL: CPT

## 2021-07-13 NOTE — TELEPHONE ENCOUNTER
Nolvia Hardy calling because they said they still haven't received a fax on the paper work that was sent over on the patient. Nolvia Hardy said she was told on the 06/23/21 It was received and they faxed it on the 06/16/21. I didn't see it in the patient's chart. [facility] direct fax # [phone].         Please call if any questions Nolvia Hardy said

## 2021-07-13 NOTE — TELEPHONE ENCOUNTER
PT's  called to let Dr Rosy Blandon know that blood sugars are running into the 400 - 500 range again. He would like to know if the PT can start on the Synjardy XR 12.5mg/1000 again. If so please send to [place] at [phone]. Call PT's  and advise.

## 2021-07-13 NOTE — TELEPHONE ENCOUNTER
Please advise patient that the most recent creatinine was 1.5 on July 2021 so she should not be taking Metformin which is part of Synjardy so at this time with her kidneys I would recommend increasing the dose of long-acting insulin by 10  units and short acting insulin by 5 units with each meal and sending her glucose logs to make.   Specially for morning glucose are still running above 300

## 2021-07-15 ENCOUNTER — TELEPHONE (OUTPATIENT)
Dept: INTERNAL MEDICINE CLINIC | Age: 51
End: 2021-07-15

## 2021-07-15 LAB
ORGANISM: ABNORMAL
URINE CULTURE, ROUTINE: ABNORMAL

## 2021-07-15 NOTE — TELEPHONE ENCOUNTER
Leann Youssef with [facility] calling with update on UA and culture. Stated that UA came back abnormal and that they are waiting for the culture to come back.

## 2021-07-16 RX ORDER — LEVOFLOXACIN 250 MG/1
250 TABLET ORAL DAILY
Qty: 3 TABLET | Refills: 0 | Status: SHIPPED | OUTPATIENT
Start: 2021-07-16 | End: 2021-07-19

## 2021-07-20 ENCOUNTER — HOSPITAL ENCOUNTER (OUTPATIENT)
Age: 51
Setting detail: SPECIMEN
Discharge: HOME | End: 2021-07-20
Payer: COMMERCIAL

## 2021-07-20 LAB
ANION GAP SERPL CALCULATED.3IONS-SCNC: 5 MMOL/L (ref 3–16)
BUN BLDV-MCNC: 62 MG/DL (ref 7–20)
CALCIUM SERPL-MCNC: 10.2 MG/DL (ref 8.3–10.6)
CHLORIDE BLD-SCNC: 85 MMOL/L (ref 99–110)
CO2: 38 MMOL/L (ref 21–32)
CREAT SERPL-MCNC: 1.6 MG/DL (ref 0.6–1.1)
GFR AFRICAN AMERICAN: 41
GFR NON-AFRICAN AMERICAN: 34
GLUCOSE BLD-MCNC: 172 MG/DL (ref 70–99)
POTASSIUM SERPL-SCNC: 4.7 MMOL/L (ref 3.5–5.1)
PRO-BNP: 82 PG/ML (ref 0–124)
SODIUM BLD-SCNC: 128 MMOL/L (ref 136–145)

## 2021-07-20 PROCEDURE — 80048 BASIC METABOLIC PNL TOTAL CA: CPT

## 2021-07-20 PROCEDURE — 36415 COLL VENOUS BLD VENIPUNCTURE: CPT

## 2021-07-20 PROCEDURE — 83880 ASSAY OF NATRIURETIC PEPTIDE: CPT

## 2021-07-26 ENCOUNTER — TELEPHONE (OUTPATIENT)
Dept: CARDIOLOGY CLINIC | Age: 51
End: 2021-07-26

## 2021-07-26 ENCOUNTER — NURSE TRIAGE (OUTPATIENT)
Dept: OTHER | Facility: CLINIC | Age: 51
End: 2021-07-26

## 2021-07-26 ENCOUNTER — OFFICE VISIT (OUTPATIENT)
Dept: INTERNAL MEDICINE CLINIC | Age: 51
End: 2021-07-26
Payer: COMMERCIAL

## 2021-07-26 VITALS
DIASTOLIC BLOOD PRESSURE: 60 MMHG | WEIGHT: 195 LBS | BODY MASS INDEX: 38.08 KG/M2 | OXYGEN SATURATION: 94 % | SYSTOLIC BLOOD PRESSURE: 104 MMHG | HEART RATE: 79 BPM
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
## 2021-07-26 DIAGNOSIS — M51.36 DDD (DEGENERATIVE DISC DISEASE), LUMBAR: ICD-10-CM

## 2021-07-26 DIAGNOSIS — M50.30 DDD (DEGENERATIVE DISC DISEASE), CERVICAL: ICD-10-CM

## 2021-07-26 DIAGNOSIS — S13.9XXD NECK SPRAIN, SUBSEQUENT ENCOUNTER: ICD-10-CM

## 2021-07-26 PROBLEM — S13.9XXA NECK SPRAIN: Status: ACTIVE | Noted: 2021-07-26
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
## 2021-07-26 PROCEDURE — 99213 OFFICE O/P EST LOW 20 MIN: CPT | Performed by: INTERNAL MEDICINE

## 2021-07-26 RX ORDER — METHOCARBAMOL 750 MG/1
750 TABLET, FILM COATED ORAL 3 TIMES DAILY PRN
Qty: 30 TABLET | Refills: 0 | Status: SHIPPED | OUTPATIENT
Start: 2021-07-26

## 2021-07-26 ASSESSMENT — ENCOUNTER SYMPTOMS
BACK PAIN: 1
PHOTOPHOBIA: 0
TROUBLE SWALLOWING: 0

## 2021-07-26 NOTE — ASSESSMENT & PLAN NOTE
Explained to patient and  will attempt to obtain imaging studies from ER visit at [facility] however with absence of significant findings on imaging as reported by her  and no acute findings on exam today except for subjective tenderness and presence of preexistent condition she most likely is experiencing muscle spasm. She is reporting adequate relief with using her pain medications, advised to stop using ice pack and try a heating pad instead, will reorder Robaxin to help along with her continuing current narcotic regimen. She does have an appointment scheduled with her PCP in 2 weeks and she does get home health already so we will follow-up as scheduled.
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
## 2021-07-27 ENCOUNTER — TELEPHONE (OUTPATIENT)
Dept: CARDIOLOGY CLINIC | Age: 51
End: 2021-07-27

## 2021-07-27 NOTE — TELEPHONE ENCOUNTER
This has been addressed by ARETHA yesterday. Wt 197 per . Pt has increased wt 10 pounds over the last week per her .  does not remember what pt's wt was last week when asked. States he calls it into [facility] daily. Per notes, 6.3 pound weight gain since 7/21/21. pt took a metolazone 2.5mg today and 7/25. Reviewed torsemide dose with him and pt is taking it correctly. 100mg in the AM and 50mg in the PM.     He will call if wt continues to increase.

## 2021-07-27 NOTE — TELEPHONE ENCOUNTER
----- Message from HERMES Cain - CNS sent at 7/21/2021  2:24 PM EDT -----  Covering labs as Dr Oni Campos is out of the office  Her sodium is 128 bun 62 and normal bnp  What is her weight as she looks on the dry side and needs to hold a dose of diuretics  thanks

## 2021-07-27 NOTE — TELEPHONE ENCOUNTER
Called and talked with Tanner Jimenez and relayed message below,with verbalized understanding and encouragement to call office back with any questions.

## 2021-07-28 ENCOUNTER — TELEPHONE (OUTPATIENT)
Dept: CARDIOLOGY CLINIC | Age: 51
End: 2021-07-28

## 2021-07-28 NOTE — TELEPHONE ENCOUNTER
Mr. Obduliaradha Field calling about pt's weight. Her weight today is 197lbs. He advised he calls every day with this info. Pls advise.

## 2021-07-29 ENCOUNTER — TELEPHONE (OUTPATIENT)
Dept: INTERNAL MEDICINE CLINIC | Age: 51
End: 2021-07-29

## 2021-07-29 NOTE — TELEPHONE ENCOUNTER
Pt  calling back pt took the metolazone and she is still at 196 lbs today,  wants to know if pt can start Lasix?  Pls call to advise Thank you

## 2021-07-29 NOTE — TELEPHONE ENCOUNTER
Isn't she taking torsemide 100 mg in the morninng and 50 mg in the afternoon? I suggest another dose of metolazone.   ARETHA

## 2021-07-29 NOTE — TELEPHONE ENCOUNTER
Rosana Millard with [facility] wanted to know if  received a letter for Level of Care determination form. She states she sent it on Monday.

## 2021-07-30 ENCOUNTER — TELEPHONE (OUTPATIENT)
Dept: CARDIOLOGY CLINIC | Age: 51
End: 2021-07-30

## 2021-07-31 ENCOUNTER — HOSPITAL ENCOUNTER (OUTPATIENT)
Age: 51
Setting detail: SPECIMEN
Discharge: HOME | End: 2021-07-31
Payer: COMMERCIAL

## 2021-07-31 LAB
ANION GAP SERPL CALCULATED.3IONS-SCNC: 12 MMOL/L (ref 3–16)
BUN BLDV-MCNC: 44 MG/DL (ref 7–20)
CALCIUM SERPL-MCNC: 9.5 MG/DL (ref 8.3–10.6)
CHLORIDE BLD-SCNC: 89 MMOL/L (ref 99–110)
CO2: 32 MMOL/L (ref 21–32)
CREAT SERPL-MCNC: 1.4 MG/DL (ref 0.6–1.1)
GFR AFRICAN AMERICAN: 48
GFR NON-AFRICAN AMERICAN: 39
GLUCOSE BLD-MCNC: 213 MG/DL (ref 70–99)
POTASSIUM SERPL-SCNC: 4.4 MMOL/L (ref 3.5–5.1)
PRO-BNP: 253 PG/ML (ref 0–124)
SODIUM BLD-SCNC: 133 MMOL/L (ref 136–145)

## 2021-07-31 PROCEDURE — 36415 COLL VENOUS BLD VENIPUNCTURE: CPT

## 2021-07-31 PROCEDURE — 80048 BASIC METABOLIC PNL TOTAL CA: CPT

## 2021-07-31 PROCEDURE — 83880 ASSAY OF NATRIURETIC PEPTIDE: CPT

## 2021-08-03 ENCOUNTER — TELEPHONE (OUTPATIENT)
Dept: CARDIOLOGY CLINIC | Age: 51
End: 2021-08-03

## 2021-08-04 ENCOUNTER — TELEPHONE (OUTPATIENT)
Dept: INTERNAL MEDICINE CLINIC | Age: 51
End: 2021-08-04

## 2021-08-05 ENCOUNTER — TELEPHONE (OUTPATIENT)
Dept: CARDIOLOGY CLINIC | Age: 51
End: 2021-08-05

## 2021-08-05 NOTE — TELEPHONE ENCOUNTER
Juan F Barron, calling in to report the pt did take one dose of the Metolazone 2.5 mg last night. Today her weight is up two pounds.  is asking for IV Lasix.

## 2021-08-05 NOTE — TELEPHONE ENCOUNTER
Spoke with Danielle Szymanski explaining ARETHA orders. She will speak with Pt's spouse and her manager, Phalen then get back with us.

## 2021-08-05 NOTE — TELEPHONE ENCOUNTER
She would have to come into the infusion center. Is she willing to do that? We can try to set up for tomorrow. Brea, can you call? Lasix 120 IV x 1 with 20 mg/hr x 2 hours.     ARETHA

## 2021-08-06 NOTE — TELEPHONE ENCOUNTER
Spoke with Eliceo Murrieta. Explained that it's too late for pt to get infusion today since patient is unavalable until after a 3:00pm [facility]. Pt's Spouse told Eliceo Murrieta that he is agreeable to an appt with [facility] either 8/9 or 8/10. Order faxed per ARETHA orders:  Lasix 120 IV x 1 with 20 mg/hr x 2 hours.

## 2021-08-09 ENCOUNTER — TELEPHONE (OUTPATIENT)
Dept: CARDIOLOGY CLINIC | Age: 51
End: 2021-08-09

## 2021-08-09 ENCOUNTER — HOSPITAL ENCOUNTER (OUTPATIENT)
Dept: ONCOLOGY | Age: 51
Setting detail: INFUSION SERIES
Discharge: HOME | End: 2021-08-09
Payer: COMMERCIAL

## 2021-08-09 ENCOUNTER — OFFICE VISIT (OUTPATIENT)
Dept: CARDIOLOGY CLINIC | Age: 51
End: 2021-08-09
Payer: COMMERCIAL

## 2021-08-09 VITALS
BODY MASS INDEX: 38.64 KG/M2 | DIASTOLIC BLOOD PRESSURE: 52 MMHG | SYSTOLIC BLOOD PRESSURE: 102 MMHG | HEIGHT: 60 IN | OXYGEN SATURATION: 95 % | WEIGHT: 196.8 LBS | HEART RATE: 73 BPM

## 2021-08-09 VITALS — SYSTOLIC BLOOD PRESSURE: 111 MMHG | DIASTOLIC BLOOD PRESSURE: 67 MMHG | RESPIRATION RATE: 16 BRPM | HEART RATE: 69 BPM

## 2021-08-09 DIAGNOSIS — E89.89 LYMPHEDEMA OF UPPER EXTREMITY FOLLOWING LYMPHADENECTOMY: ICD-10-CM

## 2021-08-09 DIAGNOSIS — I50.32 CHRONIC HEART FAILURE WITH PRESERVED EJECTION FRACTION (HCC): Primary | ICD-10-CM

## 2021-08-09 DIAGNOSIS — D50.9 IRON DEFICIENCY ANEMIA, UNSPECIFIED IRON DEFICIENCY ANEMIA TYPE: ICD-10-CM

## 2021-08-09 DIAGNOSIS — I10 ESSENTIAL HYPERTENSION: ICD-10-CM

## 2021-08-09 DIAGNOSIS — I50.32 CHRONIC HEART FAILURE WITH PRESERVED EJECTION FRACTION (HCC): ICD-10-CM

## 2021-08-09 DIAGNOSIS — I89.0 LYMPHEDEMA OF UPPER EXTREMITY FOLLOWING LYMPHADENECTOMY: ICD-10-CM

## 2021-08-09 DIAGNOSIS — R06.02 SOB (SHORTNESS OF BREATH): ICD-10-CM

## 2021-08-09 DIAGNOSIS — N28.9 RENAL INSUFFICIENCY: ICD-10-CM

## 2021-08-09 LAB
HCT VFR BLD CALC: 36 % (ref 36–48)
HEMOGLOBIN: 11.9 G/DL (ref 12–16)
IRON SATURATION: 36 % (ref 15–50)
IRON: 110 UG/DL (ref 37–145)
MCH RBC QN AUTO: 28.8 PG (ref 26–34)
MCHC RBC AUTO-ENTMCNC: 32.9 G/DL (ref 31–36)
MCV RBC AUTO: 87.3 FL (ref 80–100)
PDW BLD-RTO: 13 % (ref 12.4–15.4)
PLATELET # BLD: 286 K/UL (ref 135–450)
PMV BLD AUTO: 8.6 FL (ref 5–10.5)
RBC # BLD: 4.12 M/UL (ref 4–5.2)
TOTAL IRON BINDING CAPACITY: 303 UG/DL (ref 260–445)
WBC # BLD: 7.9 K/UL (ref 4–11)

## 2021-08-09 PROCEDURE — G8417 CALC BMI ABV UP PARAM F/U: HCPCS | Performed by: INTERNAL MEDICINE

## 2021-08-09 PROCEDURE — 99215 OFFICE O/P EST HI 40 MIN: CPT | Performed by: INTERNAL MEDICINE

## 2021-08-09 PROCEDURE — G8427 DOCREV CUR MEDS BY ELIG CLIN: HCPCS | Performed by: INTERNAL MEDICINE

## 2021-08-09 PROCEDURE — 96375 TX/PRO/DX INJ NEW DRUG ADDON: CPT

## 2021-08-09 PROCEDURE — 83550 IRON BINDING TEST: CPT

## 2021-08-09 PROCEDURE — 3017F COLORECTAL CA SCREEN DOC REV: CPT | Performed by: INTERNAL MEDICINE

## 2021-08-09 PROCEDURE — 1036F TOBACCO NON-USER: CPT | Performed by: INTERNAL MEDICINE

## 2021-08-09 PROCEDURE — 2580000003 HC RX 258: Performed by: INTERNAL MEDICINE

## 2021-08-09 PROCEDURE — 83540 ASSAY OF IRON: CPT

## 2021-08-09 PROCEDURE — 6360000002 HC RX W HCPCS: Performed by: INTERNAL MEDICINE

## 2021-08-09 PROCEDURE — 96366 THER/PROPH/DIAG IV INF ADDON: CPT

## 2021-08-09 PROCEDURE — 85027 COMPLETE CBC AUTOMATED: CPT

## 2021-08-09 PROCEDURE — 96365 THER/PROPH/DIAG IV INF INIT: CPT

## 2021-08-09 RX ORDER — FUROSEMIDE 10 MG/ML
120 INJECTION INTRAMUSCULAR; INTRAVENOUS ONCE
Status: COMPLETED | OUTPATIENT
Start: 2021-08-09 | End: 2021-08-09

## 2021-08-09 RX ADMIN — FUROSEMIDE 20 MG/HR: 10 INJECTION INTRAMUSCULAR; INTRAVENOUS at 14:05

## 2021-08-09 RX ADMIN — FUROSEMIDE 120 MG: 10 INJECTION, SOLUTION INTRAMUSCULAR; INTRAVENOUS at 13:58

## 2021-08-09 NOTE — TELEPHONE ENCOUNTER
Spoke to  and confirmed ARETHA appt at 1:00 and infusion center treatment at 1:30 (per Deysi request)  verbalized understanding and thanked RN for the call.

## 2021-08-09 NOTE — PROGRESS NOTES
also complains of dizziness and swelling. Her left hand/arm is more swollen than in the past and she has swelling to her right hand. She is here today with her  and walking on her own. She is to go to the infusion center for Lasix 120mg IVP and 20mg/hr x 2 hours from the office visit. She also has upcoming appt with Rheumatology Dr. Kevin Brambila. Allergies   Allergen Reactions    Iv Dye [Iodides] Anaphylaxis     allergic to ct scan dye, not the MRI    Diazepam Other (See Comments)    Trazodone And Nefazodone Other (See Comments)     Makes patient feel very sluggish     Current Outpatient Medications   Medication Sig Dispense Refill    levothyroxine (SYNTHROID) 150 MCG tablet TAKE 1 TABLET BY MOUTH IN THE MORNING (BEFORE BREAKFAST) 90 tablet 1    methocarbamol (ROBAXIN-750) 750 MG tablet Take 1 tablet by mouth 3 times daily as needed (pain and spasm) 30 tablet 0    montelukast (SINGULAIR) 10 MG tablet TAKE 1 TABLET BY MOUTH EVERY DAY  30 tablet 0    AMITIZA 24 MCG capsule TAKE 1 CAPSULE BY MOUTH TWO TIMES A DAY WITH MEALS 60 capsule 5    omeprazole (PRILOSEC) 20 MG delayed release capsule TAKE 1 CAPSULE BY MOUTH TWO TIMES A DAY  60 capsule 5    nitroGLYCERIN (NITROSTAT) 0.4 MG SL tablet PLACE ONE TABLET UNDER TONGUE AS NEEDED FOR CHEST PAIN, MAY REPEAT EVERY 5 MINUTES AS NEEDED UP TO A MAX OF 3 TABLETS. IF NO RELIEF AFTER 1 DOSE, CALL 911. 25 tablet 0    Blood Glucose Monitoring Suppl (ONETOUCH VERIO) w/Device KIT Use to check glucose 4 times daily.  1 kit 0    FEROSUL 325 (65 Fe) MG tablet TAKE 1 TABLET BY MOUTH TWO TIMES A DAY WITH MEALS 180 tablet 0    Ertugliflozin L-PyroglutamicAc (STEGLATRO) 5 MG TABS TAKE 1 TABLET BY MOUTH ONCE DAILY 90 tablet 1    spironolactone (ALDACTONE) 50 MG tablet TAKE 2 TABLETS BY MOUTH IN THE MORNING AND THEN TAKE 1 TABLET BY MOUTH IN THE EVENING 270 tablet 3    torsemide (DEMADEX) 100 MG tablet TAKE 1 TABLET BY MOUTH IN THE MORNING AND 1/2 TABLET BY MOUTH IN THE EVENING 135 tablet 3    insulin aspart (NOVOLOG FLEXPEN) 100 UNIT/ML injection pen INJECT 50 UNITS INTO THE SKIN THREE TIMES DAILY BEFORE MEALS 15 pen 3    blood glucose test strips (ONETOUCH VERIO) strip Test 4 times daily. 200 each 5    vitamin D3 (CHOLECALCIFEROL) 25 MCG (1000 UT) TABS tablet TAKE 3 TABLETS BY MOUTH ONE TIME A DAY 90 tablet 5    Insulin Degludec (TRESIBA FLEXTOUCH) 200 UNIT/ML SOPN inject 200 units subcutaneously once daily (Patient taking differently: 90 Units 2 times daily inject 200 units subcutaneously once daily) 20 pen 3    metOLazone (ZAROXOLYN) 2.5 MG tablet TAKE 1 TABLET BY MOUTH TWICE A WEEK AS NEEDED FOR WEIGHT OVER 180POUNDS. DO NOT TAKE 2 DAYS IN A ROW. (Patient taking differently: Take 2.5 mg by mouth Twice a Week Once every five days) 36 tablet 2    simvastatin (ZOCOR) 20 MG tablet TAKE 1 TABLET BY MOUTH EVERY DAY AT NIGHT 90 tablet 3    metoprolol tartrate (LOPRESSOR) 25 MG tablet TAKE 1 TABLET BY MOUTH TWO TIMES A DAY  180 tablet 3    ranolazine (RANEXA) 500 MG extended release tablet TAKE 1 TABLET BY MOUTH TWO TIMES A DAY  180 tablet 3    colchicine (COLCRYS) 0.6 MG tablet Take 0.6 mg by mouth daily      senna (SENOKOT) 8.6 MG tablet Take 1 tablet by mouth 2 times daily 60 tablet 11    butalbital-acetaminophen-caffeine (FIORICET, ESGIC) -40 MG per tablet Take 1 tablet by mouth every 6 hours as needed for Headaches 30 tablet 1    nystatin (MYCOSTATIN) 480580 UNIT/ML suspension Take 5 mLs by mouth 4 times daily 500 mL 1    Magic Mouthwash (MIRACLE MOUTHWASH) Swish and spit 5 mLs 4 times daily as needed for Irritation or Pain 300 mL 2    loratadine (CLARITIN) 10 MG tablet TAKE 1 TABLET BY MOUTH ONE TIME A DAY  30 tablet 5    lidocaine viscous hcl (XYLOCAINE) 2 % SOLN solution Take 5 mLs by mouth every 3 hours as needed for Irritation or Pain 100 mL 2    ketoconazole (NIZORAL) 2 % shampoo Apply topically daily as needed.  120 mL 1    diazePAM (VALIUM) 5 MG tablet Take 5 mg by mouth 2 times daily as needed for Anxiety.  cariprazine hcl (VRAYLAR) 1.5 MG capsule Take 3 mg by mouth daily       Febuxostat 80 MG TABS Take 80 mg by mouth daily      pregabalin (LYRICA) 50 MG capsule Take 50 mg by mouth 2 times daily.  leflunomide (ARAVA) 10 MG tablet Take 10 mg by mouth daily       OXYGEN Inhale 2 L into the lungs nightly Sometimes with activity      oxyCODONE (OXYCONTIN) 20 MG extended release tablet Take 20 mg by mouth every 12 hours.  aspirin 81 MG EC tablet Take 81 mg by mouth daily      sulfaSALAzine (AZULFIDINE) 500 MG tablet Take 500 mg by mouth daily       benztropine (COGENTIN) 1 MG tablet Take 1 mg by mouth nightly       folic acid (FOLVITE) 1 MG tablet Take 1 mg by mouth daily      oxyCODONE-acetaminophen (PERCOCET)  MG per tablet Take 1 tablet by mouth every 4 hours as needed for Pain . Earliest Fill Date: 6/8/17 100 tablet 0    duloxetine (CYMBALTA) 60 MG capsule Take 60 mg by mouth 2 times daily.  Insulin Pen Needle (B-D UF III MINI PEN NEEDLES) 31G X 5 MM MISC use five times daily with insulin 200 each 5    senna-docusate (PERICOLACE) 8.6-50 MG per tablet Take 2 tablets by mouth 2 times daily 360 tablet 3    traZODone (DESYREL) 50 MG tablet Take 1 tablet by mouth nightly Take it on the day of sleep study (Patient not taking: Reported on 8/9/2021) 1 tablet 0     No current facility-administered medications for this visit.      Facility-Administered Medications Ordered in Other Visits   Medication Dose Route Frequency Provider Last Rate Last Admin    furosemide (LASIX) injection 120 mg  120 mg Intravenous Once Sarai Adam MD        furosemide (LASIX) 100 mg in dextrose 5 % 100 mL infusion  20 mg/hr Intravenous Continuous Sarai Adam MD         Past Medical History:   Diagnosis Date    Anxiety     Anxiety and depression     Arthritis     not sure of specific type    Asthma     CAD (coronary artery disease)     Cancer [facility]) 2007    left breast    Cerebral artery occlusion with cerebral infarction [facility]     ,     CHF (congestive heart failure) ([facility] 2018    Chronic kidney disease     renal insufficency/to see Dr Mary Fernandez    Chronic pain     Constipation     COPD (chronic obstructive pulmonary disease) ([facility]     Depression     Diabetes mellitus ([facility]     Diabetic polyneuropathy associated with type 2 diabetes mellitus ([facility] 2018    Dysthymia 2018    ESBL (extended spectrum beta-lactamase) producing bacteria infection 2018    urine    Fibromyalgia     Gastric ulcer, unspecified as acute or chronic, without mention of hemorrhage, perforation, or obstruction     GERD (gastroesophageal reflux disease)     Gout     HIGH CHOLESTEROL     Hypertension     Hypothyroidism     Severe persistent asthma without complication 8906    Thyroid disease     TIA (transient ischemic attack)     with occasional left leg and hand weakness     Past Surgical History:   Procedure Laterality Date    BREAST SURGERY      left mastectomy    CARPAL TUNNEL RELEASE      Bilateral    FINGER CONTRACTURE SURGERY      HYSTERECTOMY  2005    USO    TONSILLECTOMY      UPPER GASTROINTESTINAL ENDOSCOPY  2019    stretched esophagous      Family History   Problem Relation Age of Onset    Asthma Other     Cancer Other     Depression Other     Diabetes Other     Hypertension Other     High Cholesterol Other     Migraines Other     Heart Attack Father 72         of MI    High Blood Pressure Mother     Diabetes Mother      Social History     Socioeconomic History    Marital status:      Spouse name: Medardo Willis    Number of children: 3    Years of education: Not on file    Highest education level: Not on file   Occupational History    Occupation: disabled   Tobacco Use    Smoking status: Never Smoker    Smokeless tobacco: Never Used   Vaping Use    Vaping Use: Never used   Substance and Sexual Activity    Alcohol use: No    Drug use: No    Sexual activity: Not Currently   Other Topics Concern    Not on file   Social History Narrative    Not on file     Social Determinants of Health     Financial Resource Strain:     Difficulty of Paying Living Expenses:    Food Insecurity:     Worried About Running Out of Food in the Last Year:     [address] in the Last Year:    Transportation Needs:     Lack of Transportation (Medical):  Lack of Transportation (Non-Medical):    Physical Activity:     Days of Exercise per Week:     Minutes of Exercise per Session:    Stress:     Feeling of Stress :    Social Connections:     Frequency of Communication with Friends and Family:     Frequency of Social Gatherings with Friends and Family:     Attends Anabaptism Services:     Active Member of Clubs or Organizations:     Attends Club or Organization Meetings:     Marital Status:    Intimate Partner Violence:     Fear of Current or Ex-Partner:     Emotionally Abused:     Physically Abused:     Sexually Abused:      Review of Systems:   · Constitutional: there has been no unanticipated weight loss. There's been no change in energy level, sleep pattern, or activity level. · Eyes: No visual changes or diplopia. No scleral icterus. · ENT: No Headaches, hearing loss or vertigo. No mouth sores or sore throat. · Cardiovascular: Reviewed in HPI  · Respiratory: No cough or wheezing, no sputum production. No hematemesis. · Gastrointestinal: No abdominal pain, appetite loss, blood in stools. No change in bowel or bladder habits. · Genitourinary: No dysuria, trouble voiding, or hematuria. · Musculoskeletal:  No gait disturbance, weakness or joint complaints. · Integumentary: No rash or pruritis. · Neurological: No headache, diplopia, change in muscle strength, numbness or tingling. No change in gait, balance, coordination, mood, affect, memory, mentation, behavior.   · Psychiatric: No anxiety, no depression. · Endocrine: No malaise, fatigue or temperature intolerance. No excessive thirst, fluid intake, or urination. No tremor. · Hematologic/Lymphatic: No abnormal bruising or bleeding, blood clots or swollen lymph nodes. · Allergic/Immunologic: No nasal congestion or hives. Physical Examination:    BP (!) 102/52 (Site: Right Upper Arm, Position: Sitting, Cuff Size: Medium Adult)   Pulse 73   Ht 5' (1.524 m)   Wt 196 lb 12.8 oz (89.3 kg)   SpO2 95%   BMI 38.43 kg/m²       Wt Readings from Last 3 Encounters:   08/09/21 196 lb 12.8 oz (89.3 kg)   07/26/21 195 lb (88.5 kg)   05/06/21 185 lb 9.6 oz (84.2 kg)     BP Readings from Last 3 Encounters:   08/09/21 (!) 102/52   07/26/21 104/60   05/06/21 122/80     Constitutional and General Appearance:   WD/WN, less swollen than usual  HEENT:  NC/AT  XANDER  No problems with hearing  Skin:  Warm, dry  Respiratory:  · Normal excursion and expansion without use of accessory muscles  · Resp Auscultation: Normal breath sounds without dullness  Cardiovascular:  · The apical impulses not displaced  · Heart tones are crisp and normal  · Cervical veins are not engorged  · The carotid upstroke is normal in amplitude and contour without delay or bruit  · JVP 8-9 cm H2O  RRR with nl S1 and S2 without m,r,g  · Peripheral pulses are symmetrical and full  · There is no clubbing, cyanosis of the extremities. Bilateral  hand edema is increased 3+. Fingers very swollen. L>R  · bilateral trace leg edema.   R>L  · Femoral Arteries: 2+ and equal  · Pedal Pulses: 2+ and equal   Neck:  · No thyromegaly  Abdomen:  abdominal girth soft  · No masses or tenderness  · Liver/Spleen: No Abnormalities Noted  Neurological/Psychiatric:  · Alert and oriented in all spheres  · Moves all extremities well  · Exhibits normal gait balance and coordination  · No abnormalities of mood, affect, memory, mentation, or behavior are noted    Diagnostics:    Stress Test 7-1-2020  Alisa Woody is normal isotope uptake at stress and rest. There is no evidence of     myocardial ischemia or scar.     Normal LV function.     Left ventricular ejection fraction of 59 %.     Overall findings represent a low risk scan.         Stress Protocols          Resting ECG     Normal sinus rhythm. Left Heart Cath 2/27/18:  Dominance : Right     LM: bifurcating, MLI  LAD: mild plaquing with small vessel disease distally   LCx: no significant disease  RCA: MLI     LVEDP: 25 mmHg   No Ao gradient     Right Heart Cath   RA: 13  RV: 47/6/16  PA:   46/19    and mean of 32  PCWP: 21 mmHg      Sats:  Ao: 92%  RA: 61%  PA: 62% (x 2)   CO/CI : 6.1 L    CXR 1/15/18:  No acute cardiopulmonary disease     Echo 11/8/2017:  Normal left ventricle size and systolic function with an estimated ejection   fraction of 60%. No regional wall motion abnormalities are seen.  Alisa Seller is borderline concentric left ventricular hypertrophy.   E/e\"= 13     Stress test 11/8/2017: There is normal isotope uptake at stress and rest. There is no evidence of  myocardial ischemia or scar.  Normal LV function.  Overall findings represent a low risk scan.       VQ scan negative 1/11/2018    CXR 1/16/18  No acute cardiopulmonary disease     Echo 11/8/2017:  Normal left ventricle size and systolic function with an estimated ejection fraction of 60%. No regional wall motion abnormalities are seen. There is borderline concentric left ventricular hypertrophy.   E/e\"= 13     Stress test 11/8/2017: There is normal isotope uptake at stress and rest. There is no evidence of  myocardial ischemia or scar.  Normal LV function.  Overall findings represent a low risk scan.       VQ scan 1/11/2018:  Normal study.  No evidence of pulmonary embolus. Labs were reviewed including labs from other hospital systems through [facility].   Cardiac testing was reviewed including echos, nuclear scans, cardiac catheterization, including from other hospital systems through [facility].     Assessment:  1. Chronic heart failure with preserved ejection fraction ([address]    2. Renal insufficiency    3. SOB (shortness of breath)    4. Essential hypertension    5. Lymphedema of upper extremity following lymphadenectomy    6. Iron deficiency anemia, unspecified iron deficiency anemia type        1. Chronic heart failure with preserved ejection fraction ([address] :  Partially compensated. ProBNP 6/11/20> 125, 6/15/20> 132  Keep weight at 175-180 pounds: Torsemide 100mg in the am and 50mg in the evening. Less than 174 pounds, take torsemide 50 mg twice daily. Continue Metolazone 3 times in a 2 week interval.  Watch weight and if weight is staying down, only take it once a week. -Weight 187 and stable at home. Wt now up to 195-197 in Aug 2021. Continues with Counts include [facility] beds [facility] the [facility].   ~To Infusion center today     2. Coronary artery disease involving native coronary artery of native heart without angina pectoris    3. Renal insufficiency:   1.3-1.4. High as 1.6     4. SOB (shortness of breath):  Chronic SOB. --Continue Spironolactone and Torsemide.    -Labs every 2 weeks with [facility]. 5. Essential hypertension: BP (!) 102/52 (Site: Right Upper Arm, Position: Sitting, Cuff Size: Medium Adult)   Pulse 73   Ht 5' (1.524 m)   Wt 196 lb 12.8 oz (89.3 kg)   SpO2 95%   BMI 38.43 kg/m²   -Stable. Lymphedema of upper extremity following lymphadenectomy:  L>R.     -++ chronic edema to left hand and left arm. Try to keep it elevated. Insurance will not cover automatic lymph machine. ~LUIS (obstructive sleep apnea): Uses CPAP therapy. ~CAD with angina pectoris:  Chronic chest pain usually relieved with 1-2 NTG. [address] [place] [address] 2014 with diffuse LAD disease and small vessel disease. ~Hyperlipidemia LDL goal <70:  2/2019> . Not at goal.  Continue Zocor. ~Rheumatoid arthritis involving multiple sites:  She is on chronic pain meds.         Plan:  All cardiac test and lab results personally reviewed by me during this office visit. 1.   Add labs to blood in lab. Iron and CBC  2.  Echo in 3 months and office visit. 3.  No medication changes. Consider Metolazone 5mg. 4.  She is going to the infusion center today for IV Lasix and lasix infusion    Scribe's attestation: This note was scribed in the presence of Ponce Taylor M.D. by Gerardo Pacheco RN     The scribe's documentation has been prepared under my direction and personally reviewed by me in its entirety. I confirm that the note above accurately reflects all work, treatment, procedures, and medical decision making performed by me. QUALITY MEASURES  1. Tobacco Cessation Counseling: NA  2. Retake of BP if >140/90: NA  3. Documentation to PCP/referring for new patient:  Sent to PCP at close of office visit. Yes  4. CAD patient on anti-platelet:   5. CAD patient on STATIN therapy:    6. Patient with CHF and aFib on anticoagulation                 Time Based Itemization  A total of 40 minutes was spent on today's patient encounter. If applicable, non-patient-facing activities:  ( x)Preparing to see the patient and reviewing records  (x ) Individual interpretation of results  (x ) Discussion or coordination of care with other health care professionals.   Infusion Center  ( x) Ordering of unique tests, medications, or procedures  ( x) Documentation within the EHR                                             :      Ponce Taylor MD [facility]

## 2021-08-09 NOTE — PATIENT INSTRUCTIONS
Plan:  All cardiac test and lab results personally reviewed by me during this office visit. 1.   Add labs to blood in lab. Iron and CBC  2.  Echo in 3 months and office visit. 3.  No medication changes. Consider Metolazone 5mg.

## 2021-08-09 NOTE — PROGRESS NOTES
Lasix gtt completed. Pt eliana with no adverse reaction noted. AVS printed and reviewed.  Pt to follow up with Md.

## 2021-08-09 NOTE — PROGRESS NOTES
Lasix 120 mg given slowly over 6 minutes followed by lasix gtt started at 20mg/hr.  Will cont to monitor

## 2021-08-09 NOTE — PROGRESS NOTES
8/10/2021  Patient Name: Lamar García  : 1970  Medical Record: [id number]      ASSESSMENT AND PLAN    Assessment/Plan:      ASSESSMENT:    1. Seronegative rheumatoid arthritis ([facility] [address]    2. High risk medication use    3. Idiopathic chronic gout of multiple sites without tophus    4. DDD (degenerative disc disease), lumbar    5. DDD (degenerative disc disease), cervical    6. Primary osteoarthritis involving multiple joints        PLAN:     Crow was seen today for establish care. Diagnoses and all orders for this visit:    Seronegative rheumatoid arthritis ([facility] [address]  -     C-Reactive Protein; Future  -     Sedimentation Rate; Future  -     Rheumatoid Factor; Future  -     TSH WITH REFLEX TO FT4; Future  -     Cyclic Citrul Peptide Antibody, IgG; Future  -     Hepatitis B Core Antibody, IgM; Future  -     Hepatitis B Surface Antigen; Future  -     Hepatitis C Antibody; Future  -     Miscellaneous Sendout 1; Future  -     HLA-B27 Antigen; Future  -     Quantiferon, Incubated; Future  -     XR HAND RIGHT (MIN 3 VIEWS); Future  -     XR HAND LEFT (MIN 3 VIEWS); Future  -     GLUCOSE 6 PHOSPHATE DEHYDROGENASE; Future    High risk medication use  -     CBC Auto Differential; Future  -     Comprehensive Metabolic Panel; Future    Idiopathic chronic gout of multiple sites without tophus  -     Uric Acid; Future    DDD (degenerative disc disease), lumbar  -     XR SacroilIAC Joints PA and Oblique; Future  -     XR LUMBAR SPINE (2-3 VIEWS); Future    DDD (degenerative disc disease), cervical  -     XR CERVICAL SPINE (2-3 VIEWS); Future    Primary osteoarthritis involving multiple joints    Other orders  -     sulfaSALAzine (AZULFIDINE) 500 MG tablet; Take 1 tablet by mouth 2 times daily  -     Febuxostat 80 MG TABS; Take 80 mg by mouth daily  -     leflunomide (ARAVA) 10 MG tablet; Take 1 tablet by mouth daily  -     colchicine (COLCRYS) 0.6 MG tablet;  Take 1 tablet by mouth every other day    Seronegative rheumatoid arthritis-diagnosed more than 10 years ago. No recent blood work available. I did not appreciate any synovitis on joint exam  Hand swelling most likely multifactorial [fluid retention, diabetes, medication side effects]  I will order baseline CBC, CMP, ESR, CRP, RF, CCP, BRENNA, hand x-rays, hepatitis panel and HLA-B27 to evaluate further  She will continue sulfasalazine 500 mg twice a day and leflunomide 10 mg daily in the meantime  Previous history of methotrexate [hair loss] and Plaquenil [loss of efficacy] use. Gout-no recent flareups of gout. Continue Uloric 80 mg daily   Decrease colchicine to 0.6 mg every other day. Repeat uric acid. Consider going off of colchicine depending on the results    Degenerative disc disease in the lumbar and cervical spine-we will obtain lumbar and cervical spine x-rays. Advised to continue follow-up with a spine specialist/pain specialist.  Follows pain specialist hands is currently on Percocet, oxycodone, Lyrica, Robaxin, diazepam and Cymbalta    The patient indicates understanding of these issues and agrees with the plan. Return in about 6 weeks (around 9/21/2021). The risks and benefits of my recommendations, as well as other treatment options, benefits and side effects werediscussed with the patient. All questions were answered.     I reviewed patient's history, referral documents and electronic medical records  Copy of consult note is being routedelectronically/faxed to referring physician         MEDICATIONS  Current Outpatient Medications   Medication Sig Dispense Refill    LINZESS 290 MCG CAPS capsule TAKE 1 CAPSULE BY MOUTH ONE TIME A DAY FOR 90 DAYS      REXULTI 1 MG TABS tablet TAKE 1 TABLET BY MOUTH EVERY NIGHT      sulfaSALAzine (AZULFIDINE) 500 MG tablet Take 1 tablet by mouth 2 times daily 60 tablet 1    Febuxostat 80 MG TABS Take 80 mg by mouth daily 30 tablet 1    leflunomide (ARAVA) 10 MG tablet Take 1 tablet by mouth daily 30 tablet 1    colchicine (COLCRYS) 0.6 MG tablet Take 1 tablet by mouth every other day 15 tablet 2    levothyroxine (SYNTHROID) 150 MCG tablet TAKE 1 TABLET BY MOUTH IN THE MORNING (BEFORE BREAKFAST) 90 tablet 1    methocarbamol (ROBAXIN-750) 750 MG tablet Take 1 tablet by mouth 3 times daily as needed (pain and spasm) 30 tablet 0    montelukast (SINGULAIR) 10 MG tablet TAKE 1 TABLET BY MOUTH EVERY DAY  30 tablet 0    AMITIZA 24 MCG capsule TAKE 1 CAPSULE BY MOUTH TWO TIMES A DAY WITH MEALS 60 capsule 5    omeprazole (PRILOSEC) 20 MG delayed release capsule TAKE 1 CAPSULE BY MOUTH TWO TIMES A DAY  60 capsule 5    nitroGLYCERIN (NITROSTAT) 0.4 MG SL tablet PLACE ONE TABLET UNDER TONGUE AS NEEDED FOR CHEST PAIN, MAY REPEAT EVERY 5 MINUTES AS NEEDED UP TO A MAX OF 3 TABLETS. IF NO RELIEF AFTER 1 DOSE, CALL 911. 25 tablet 0    Blood Glucose Monitoring Suppl (ONETOUCH VERIO) w/Device KIT Use to check glucose 4 times daily. 1 kit 0    FEROSUL 325 (65 Fe) MG tablet TAKE 1 TABLET BY MOUTH TWO TIMES A DAY WITH MEALS 180 tablet 0    Ertugliflozin L-PyroglutamicAc (STEGLATRO) 5 MG TABS TAKE 1 TABLET BY MOUTH ONCE DAILY 90 tablet 1    spironolactone (ALDACTONE) 50 MG tablet TAKE 2 TABLETS BY MOUTH IN THE MORNING AND THEN TAKE 1 TABLET BY MOUTH IN THE EVENING 270 tablet 3    torsemide (DEMADEX) 100 MG tablet TAKE 1 TABLET BY MOUTH IN THE MORNING AND 1/2 TABLET BY MOUTH IN THE EVENING 135 tablet 3    insulin aspart (NOVOLOG FLEXPEN) 100 UNIT/ML injection pen INJECT 50 UNITS INTO THE SKIN THREE TIMES DAILY BEFORE MEALS 15 pen 3    blood glucose test strips (ONETOUCH VERIO) strip Test 4 times daily.  200 each 5    Insulin Pen Needle (B-D UF III MINI PEN NEEDLES) 31G X 5 MM MISC use five times daily with insulin 200 each 5    vitamin D3 (CHOLECALCIFEROL) 25 MCG (1000 UT) TABS tablet TAKE 3 TABLETS BY MOUTH ONE TIME A DAY 90 tablet 5    Insulin Degludec (TRESIBA FLEXTOUCH) 200 UNIT/ML SOPN inject 200 units subcutaneously once daily (Patient taking differently: 90 Units 2 times daily inject 200 units subcutaneously once daily) 20 pen 3    metOLazone (ZAROXOLYN) 2.5 MG tablet TAKE 1 TABLET BY MOUTH TWICE A WEEK AS NEEDED FOR WEIGHT OVER 180POUNDS. DO NOT TAKE 2 DAYS IN A ROW. (Patient taking differently: Take 2.5 mg by mouth Twice a Week Once every five days) 36 tablet 2    simvastatin (ZOCOR) 20 MG tablet TAKE 1 TABLET BY MOUTH EVERY DAY AT NIGHT 90 tablet 3    metoprolol tartrate (LOPRESSOR) 25 MG tablet TAKE 1 TABLET BY MOUTH TWO TIMES A DAY  180 tablet 3    ranolazine (RANEXA) 500 MG extended release tablet TAKE 1 TABLET BY MOUTH TWO TIMES A DAY  180 tablet 3    senna-docusate (PERICOLACE) 8.6-50 MG per tablet Take 2 tablets by mouth 2 times daily 360 tablet 3    senna (SENOKOT) 8.6 MG tablet Take 1 tablet by mouth 2 times daily 60 tablet 11    butalbital-acetaminophen-caffeine (FIORICET, ESGIC) -40 MG per tablet Take 1 tablet by mouth every 6 hours as needed for Headaches 30 tablet 1    nystatin (MYCOSTATIN) 262943 UNIT/ML suspension Take 5 mLs by mouth 4 times daily 500 mL 1    Magic Mouthwash (MIRACLE MOUTHWASH) Swish and spit 5 mLs 4 times daily as needed for Irritation or Pain 300 mL 2    traZODone (DESYREL) 50 MG tablet Take 1 tablet by mouth nightly Take it on the day of sleep study 1 tablet 0    loratadine (CLARITIN) 10 MG tablet TAKE 1 TABLET BY MOUTH ONE TIME A DAY  30 tablet 5    lidocaine viscous hcl (XYLOCAINE) 2 % SOLN solution Take 5 mLs by mouth every 3 hours as needed for Irritation or Pain 100 mL 2    ketoconazole (NIZORAL) 2 % shampoo Apply topically daily as needed. 120 mL 1    diazePAM (VALIUM) 5 MG tablet Take 5 mg by mouth 2 times daily as needed for Anxiety.  cariprazine hcl (VRAYLAR) 1.5 MG capsule Take 3 mg by mouth daily       pregabalin (LYRICA) 50 MG capsule Take 50 mg by mouth 2 times daily.       OXYGEN Inhale 2 L into the lungs nightly Sometimes with activity      oxyCODONE (OXYCONTIN) 20 MG extended release tablet Take 20 mg by mouth every 12 hours.  aspirin 81 MG EC tablet Take 81 mg by mouth daily      benztropine (COGENTIN) 1 MG tablet Take 1 mg by mouth nightly       folic acid (FOLVITE) 1 MG tablet Take 1 mg by mouth daily      oxyCODONE-acetaminophen (PERCOCET)  MG per tablet Take 1 tablet by mouth every 4 hours as needed for Pain . Earliest Fill Date: 6/8/17 100 tablet 0    duloxetine (CYMBALTA) 60 MG capsule Take 60 mg by mouth 2 times daily. No current facility-administered medications for this visit. ALLERGIES  Allergies   Allergen Reactions    Iv Dye [Iodides] Anaphylaxis     allergic to ct scan dye, not the MRI    Diazepam Other (See Comments)    Insulin Glargine Other (See Comments)     High blood sugar     Trazodone And Nefazodone Other (See Comments)     Makes patient feel very sluggish         Comments  No specialty comments available. Silvia Otoole, DO    HISTORY OF PRESENT ILLNESS  Crow Cherry is a 45 y.o. female with past medical history of hypertension, diabetes, congestive heart failure, COPD, depression, hypothyroidism, chronic kidney disease, degenerative disc disease in the lumbar spine, breast cancer status post chemoradiation, seronegative rheumatoid arthritis, gout who is being seen for follow up evaluation of  seronegative rheumatoid arthritis and gout. She was seeing Dr. Jeff Velasquez  at [facility].  She was last seen in January 2021. She was diagnosed with seronegative rheumatoid arthritis several years ago. She was on methotrexate and Plaquenil in the past which was stopped due to lack of efficacy. She is now on leflunomide 10 mg daily and sulfasalazine 500 mg twice a day. She was on sulfasalazine 1000 mg twice a day which was slowly tapered down to 500 mg twice a day since it was not helping much. She continues to have pain in the joints especially with weather changes.   She has swelling in the hands. She has stiffness in the joints that can last all day long. She denies psoriasis, inflammatory bowel disease, inflammatory back pain, dactylitis, enthesitis, tenosynovitis or uveitis. She denies fever, weight loss or swollen glands. She denies family history of rheumatoid arthritis. She has degenerative disc disease in the lumbar and cervical spine. She has low chronic low back pain. Back pain gets worse with activity and improves with rest.  Back pain radiates to the left lower extremity. She denies bowel or bladder dysfunction, saddle anesthesia. She sees a pain specialist and receives epidural spinal injections. She also has a gout which was diagnosed 2 years ago. She is on Uloric 80 mg daily and colchicine 0.6 mg daily. She is unable to go off of colchicine due to frequent flareups. She has lymphedema of the left arm after she underwent breast surgery with lymph node resection and chemoradiation. She has been on tamoxifen for several years. She is off of tamoxifen for past 2 years. She has not had any recurrence of breast cancer. Data reviewed: Reviewed notes by Dr. Terrence Zarate from January 2021 as mentioned in HPI. Chronic rheumatoid arthritis, gout, lymphedema, CKD. Taper of sulfasalazine and continue Arava 10 mg daily. Continue Uloric 80 mg daily. HPI  Review of Systems    REVIEW OF SYSTEMS: Positive for shortness of breath, wheezing, renal disease,, anxiety, depression, neuropathies, paresthesias  Constitutional: No unanticipated weight loss or fevers. Integumentary: No rash, photosensitivity, malar rash, livedo reticularis, alopecia and Raynaud's symptoms, sclerodactyly, skin tightening  Eyes: negative for visual disturbance and persistent redness, discharge from eyes   ENT: - No tinnitus, loss of hearing, vertigo, or recurrent ear infections.  - No history of nasal/oral ulcers.   - No history of dry eyes/dry mouth  Cardiovascular: No history of pericarditis, Thyroid disease     TIA (transient ischemic attack)     with occasional left leg and hand weakness     Past Surgical History:   Procedure Laterality Date    BREAST SURGERY      left mastectomy    CARPAL TUNNEL RELEASE      Bilateral    FINGER CONTRACTURE SURGERY      HYSTERECTOMY  2005    USO    TONSILLECTOMY      UPPER GASTROINTESTINAL ENDOSCOPY  2019    stretched esophagous      Social History     Socioeconomic History    Marital status:      Spouse name: Lenny English Number of children: 3    Years of education: Not on file    Highest education level: Not on file   Occupational History    Occupation: disabled   Tobacco Use    Smoking status: Never Smoker    Smokeless tobacco: Never Used   Vaping Use    Vaping Use: Never used   Substance and Sexual Activity    Alcohol use: No    Drug use: No    Sexual activity: Not Currently   Other Topics Concern    Not on file   Social History Narrative    Not on file     Social Determinants of Health     Financial Resource Strain:     Difficulty of Paying Living Expenses:    Food Insecurity:     Worried About Running Out of Food in the Last Year:     Ran Out of Food in the Last Year:    Transportation Needs:     Lack of Transportation (Medical):      Lack of Transportation (Non-Medical):    Physical Activity:     Days of Exercise per Week:     Minutes of Exercise per Session:    Stress:     Feeling of Stress :    Social Connections:     Frequency of Communication with Friends and Family:     Frequency of Social Gatherings with Friends and Family:     Attends [employer] Services:     Active Member of Clubs or Organizations:     Attends Club or Organization Meetings:     Marital Status:    Intimate Partner Violence:     Fear of Current or Ex-Partner:     Emotionally Abused:     Physically Abused:     Sexually Abused:      Family History   Problem Relation Age of Onset    Asthma Other     Cancer Other     Depression Other     Diabetes Other     Hypertension Other     High Cholesterol Other     Migraines Other     Heart Attack Father 72         of MI    High Blood Pressure Mother     Diabetes Mother          PHYSICAL EXAM   Vitals:    08/10/21 1417   BP: (!) 98/52   Pulse: 68   Weight: 194 lb 9.6 oz (88.3 kg)     Physical Exam  Constitutional:  Well developed, well nourished, no acute distress, non-toxic appearance   Musculoskeletal:    Ambulates without assistance, normal gait  Neck: Decreased range of motion, tenderness to palpation +  RIGHT  Swell  Tender  ROM  LEFT  Swell  Tender  ROM    DIP2  0  0   [place]  0  0   [place]   DIP3  0  0   [place]  0  0   [place]   DIP4  0  0   [place]  0  0   [place]den   DIP5  0  0   [place]  0  0   [place]   PIP1  0  0  FULL   0  0  FULL    PIP2  ++ ++ FULL   0  0  FULL    PIP3  ++ ++ FULL   0  0  FULL    PIP4  ++ ++ FULL   0  0  FULL    PIP5  ++ ++ FULL   0  0  FULL    MCP1  ++ ++ FULL   0  0  FULL    MCP2  ++ ++ FULL   0  0  FULL    MCP3  ++ ++ FULL   0  0  FULL    MCP4  ++ ++ FULL   0  0  FULL    MCP5  ++ ++ FULL   0  0  FULL    Wrist  0  0  FULL   0  0  FULL    Elbow  0  0  FULL   0  0  FULL    Shouldr  0  0  decr  0  0  decr   Hip  0  0  decr  0  0  decr   Knee  0  0   crepitus  0  0   crepitus   Ankle  0  0  FULL   0  0  FULL    MTP1  0  0  FULL   0  0  FULL    MTP2  0  0  FULL   0  0  FULL    MTP3  0  0  FULL   0  0  FULL    MTP4  0  0  FULL   0  0  FULL    MTP5  0  0  FULL   0  0  FULL    IP1  0  0  FULL   0  0  FULL    IP2  0  0  FULL   0  0  FULL    IP3  0  0  FULL   0  0  FULL    IP4  0  0  FULL   0  0  FULL    IP5  0  0  FULL   0 0 FULL     Lymphedema of the left hand                                            Right            Left                                   Tender Tender    Costochondral  + +   Low cervical + +   suboccipital + +   Trapezius  + +   Supraspinatus  + +   Lateral epicondyl + +   Gluteal + +   Greater trochanter  + +   knees + +     Back-tenderness to palpation BILITOT <0.2 06/01/2021 1225          Lab Results   Component Value Date    SEDRATE 112 (H) 06/01/2021     Lab Results   Component Value Date    CRP 7.6 (H) 06/01/2021     Lab Results   Component Value Date    BRENNA Negative 07/08/2010     No results found for: RF, CCPABIGG  Lab Results   Component Value Date    BRENNA Negative 07/08/2010     No results found for: DSDNAG, DSDNAIGGIFA  No results found for: SSAROAB, SSALAAB  No results found for: SMAB, RNPAB  No results found for: CENTABIGG  No results found for: C3, C4, ACE  Lab Results   Component Value Date    VITD25 46.6 05/30/2019     Lab Results   Component Value Date    LABURIC 4.4 06/01/2021     Lab Results   Component Value Date    EOXENZND70 396 01/29/2020     Lab Results   Component Value Date    TSH 0.91 10/29/2020     Lab Results   Component Value Date    VITD25 46.6 05/30/2019     TIME SPENT:  Time spent with the patient 80  minutes and 50% of the time spent with counseling on diagnosis and management, reviewing medical records, medication side effects, physical conditioning, coordination of care   ######################################################################    I thank you for giving me theopportunity to participate in [address] [place] [address]. If you have any questions or concerns please feel free to contact me. I look forward to following  Crow along with you. Electronically signed by: Chelsey Torres MD, MD, 8/10/2021 3:43 PM    Documentation was done using voice recognition dragon software. Every effort was made to ensure accuracy;however, inadvertent unintentional computerized transcription errors may be present.

## 2021-08-10 ENCOUNTER — OFFICE VISIT (OUTPATIENT)
Dept: RHEUMATOLOGY | Age: 51
End: 2021-08-10
Payer: COMMERCIAL

## 2021-08-10 VITALS
DIASTOLIC BLOOD PRESSURE: 52 MMHG | WEIGHT: 194.6 LBS | HEART RATE: 68 BPM | SYSTOLIC BLOOD PRESSURE: 98 MMHG | BODY MASS INDEX: 38.01 KG/M2

## 2021-08-10 DIAGNOSIS — M15.9 PRIMARY OSTEOARTHRITIS INVOLVING MULTIPLE JOINTS: ICD-10-CM

## 2021-08-10 DIAGNOSIS — M06.00 SERONEGATIVE RHEUMATOID ARTHRITIS (HCC): Primary | ICD-10-CM

## 2021-08-10 DIAGNOSIS — M1A.09X0 IDIOPATHIC CHRONIC GOUT OF MULTIPLE SITES WITHOUT TOPHUS: ICD-10-CM

## 2021-08-10 DIAGNOSIS — M50.30 DDD (DEGENERATIVE DISC DISEASE), CERVICAL: ICD-10-CM

## 2021-08-10 DIAGNOSIS — M51.36 DDD (DEGENERATIVE DISC DISEASE), LUMBAR: ICD-10-CM

## 2021-08-10 DIAGNOSIS — Z79.899 HIGH RISK MEDICATION USE: ICD-10-CM

## 2021-08-10 PROCEDURE — G8417 CALC BMI ABV UP PARAM F/U: HCPCS | Performed by: INTERNAL MEDICINE

## 2021-08-10 PROCEDURE — G8427 DOCREV CUR MEDS BY ELIG CLIN: HCPCS | Performed by: INTERNAL MEDICINE

## 2021-08-10 PROCEDURE — 99245 OFF/OP CONSLTJ NEW/EST HI 55: CPT | Performed by: INTERNAL MEDICINE

## 2021-08-10 RX ORDER — FEBUXOSTAT 80 MG/1
80 TABLET, FILM COATED ORAL DAILY
Qty: 30 TABLET | Refills: 1 | Status: SHIPPED | OUTPATIENT
Start: 2021-08-10 | End: 2022-02-24 | Stop reason: SDUPTHER

## 2021-08-10 RX ORDER — BREXPIPRAZOLE 2 MG/1
2 TABLET ORAL NIGHTLY
COMMUNITY
Start: 2021-07-21

## 2021-08-10 RX ORDER — LINACLOTIDE 290 UG/1
CAPSULE, GELATIN COATED ORAL
COMMUNITY
Start: 2021-06-25

## 2021-08-10 RX ORDER — COLCHICINE 0.6 MG/1
0.6 TABLET ORAL EVERY OTHER DAY
Qty: 15 TABLET | Refills: 2 | Status: SHIPPED | OUTPATIENT
Start: 2021-08-10 | End: 2021-10-13

## 2021-08-10 RX ORDER — SULFASALAZINE 500 MG/1
500 TABLET ORAL 2 TIMES DAILY
Qty: 60 TABLET | Refills: 1 | Status: SHIPPED | OUTPATIENT
Start: 2021-08-10 | End: 2021-12-15

## 2021-08-10 RX ORDER — LEFLUNOMIDE 10 MG/1
10 TABLET ORAL DAILY
Qty: 30 TABLET | Refills: 1 | Status: SHIPPED | OUTPATIENT
Start: 2021-08-10 | End: 2021-10-13 | Stop reason: SDUPTHER

## 2021-08-10 NOTE — LETTER
[facility]  [address] [id number]  Phone: [phone]  Fax: [phone]    Lionel Ordonez MD        August 10, 2021     [address]    Patient: Brittani Valdez  MR Number: [id number]  YOB: 1970  Date of Visit: 8/10/2021    Dear  [address] [place] [address]:    Thank you for your referral. Progress note attached in visit summary. If you have questions, please do not hesitate to call me. I look forward to following Crow along with you.     Sincerely,        Lionel Ordonez MD

## 2021-08-12 ENCOUNTER — TELEPHONE (OUTPATIENT)
Dept: INTERNAL MEDICINE CLINIC | Age: 51
End: 2021-08-12

## 2021-08-12 ENCOUNTER — TELEPHONE (OUTPATIENT)
Dept: CARDIOLOGY CLINIC | Age: 51
End: 2021-08-12

## 2021-08-12 RX ORDER — POLYETHYLENE GLYCOL 3350 17 G/17G
POWDER, FOR SOLUTION ORAL
Qty: 510 G | Refills: 0 | Status: SHIPPED | OUTPATIENT
Start: 2021-08-12

## 2021-08-12 NOTE — TELEPHONE ENCOUNTER
Per instruction from Dr. Justa Chacko, increase Metolazone from 2.5mg to 5mg twice weekly. Spoke to [place] and she was updated. 197.8. Per Dalia, 5 pound wt gain from 8/10. Suggested 1500ml/day= 50oz. Spoke to . Discussed pt's constipation due to pain meds. Discussed reducing oral intake of liquids. He states she Only eats ice. He is will to try to let the ice melt and see how much it is and then stop at 50oz of fluid. That way he will know how much ice that equals. He is aware to increase the Metolazone to 5mg twice a week.   (Mon and Thurs)

## 2021-08-12 NOTE — TELEPHONE ENCOUNTER
Bladimirleatha Vega from [facility] calling to report Pt had blood sugar reading at 515. Pt  was able to give her insulin and it dropped down to 346. Pt is okay as of right now not showing any concerns. They are trying to get a nurse out to her to double check on her by today or tomorrow at the latest. But, they just wanted to inform Dr. Robert Stout of what was going on.

## 2021-08-12 NOTE — TELEPHONE ENCOUNTER
Pts.  states pt. Does the Novolog on a sliding scale when she eats. He said she takes 100 units Tresiba BID. Pts.  will call endocrine to make her an appointment and pt.  Is scheduled to see Dr. Kamar Pringle on Monday

## 2021-08-12 NOTE — TELEPHONE ENCOUNTER
We addressed this yesterday. We increased the metolazone. However, she has to reduce amount of fluid she is drinking.   5 pounds of weight in 2 days means that she has to drink a lot of fluid to put weight on that fast.  ARETHA

## 2021-08-12 NOTE — TELEPHONE ENCOUNTER
We really need to be very careful putting medical information attached to refill requests. Maybe someone can show Bell Alvarado a work around for this issue. OK to call in polyethylene glycol refill under Dr. Dionne Medeiros name.

## 2021-08-12 NOTE — TELEPHONE ENCOUNTER
I am covering Dr. Bowen Ventura while she is on vacation. Sounds like sliding scale needs to be adjusted upward, but this is a very complex patient. .  Did not call back this PM and will forward to her endocrinologist.    I see she is managed by Dr. King Johnson so this phone message forwarded to Dr. King Johnson.

## 2021-08-13 NOTE — TELEPHONE ENCOUNTER
Advise patient that she needs to send me her finger stick glucose data along with records of  how much insulin and other order diabetes medication she's currently taking with timing of her meals So I can adjust her diabetes regimen

## 2021-08-16 DIAGNOSIS — R76.8 POSITIVE ANA (ANTINUCLEAR ANTIBODY): Primary | ICD-10-CM

## 2021-08-17 ENCOUNTER — HOSPITAL ENCOUNTER (OUTPATIENT)
Age: 51
Setting detail: SPECIMEN
Discharge: HOME | End: 2021-08-17
Payer: COMMERCIAL

## 2021-08-17 LAB
ALBUMIN SERPL-MCNC: 4.2 G/DL (ref 3.4–5)
ALP BLD-CCNC: 137 U/L (ref 40–129)
ALT SERPL-CCNC: 13 U/L (ref 10–40)
ANION GAP SERPL CALCULATED.3IONS-SCNC: 13 MMOL/L (ref 3–16)
AST SERPL-CCNC: 13 U/L (ref 15–37)
BASOPHILS ABSOLUTE: 0 K/UL (ref 0–0.2)
BASOPHILS RELATIVE PERCENT: 0.5 %
BILIRUB SERPL-MCNC: <0.2 MG/DL (ref 0–1)
BILIRUBIN DIRECT: <0.2 MG/DL (ref 0–0.3)
BILIRUBIN, INDIRECT: ABNORMAL MG/DL (ref 0–1)
BUN BLDV-MCNC: 54 MG/DL (ref 7–20)
C-REACTIVE PROTEIN: 8 MG/L (ref 0–5.1)
CALCIUM SERPL-MCNC: 9.9 MG/DL (ref 8.3–10.6)
CHLORIDE BLD-SCNC: 94 MMOL/L (ref 99–110)
CO2: 29 MMOL/L (ref 21–32)
CREAT SERPL-MCNC: 1.6 MG/DL (ref 0.6–1.1)
EOSINOPHILS ABSOLUTE: 0.1 K/UL (ref 0–0.6)
EOSINOPHILS RELATIVE PERCENT: 1.6 %
GFR AFRICAN AMERICAN: 41
GFR NON-AFRICAN AMERICAN: 34
GLUCOSE BLD-MCNC: 430 MG/DL (ref 70–99)
HCT VFR BLD CALC: 37 % (ref 36–48)
HEMOGLOBIN: 12 G/DL (ref 12–16)
LYMPHOCYTES ABSOLUTE: 1.8 K/UL (ref 1–5.1)
LYMPHOCYTES RELATIVE PERCENT: 23.5 %
MCH RBC QN AUTO: 29 PG (ref 26–34)
MCHC RBC AUTO-ENTMCNC: 32.4 G/DL (ref 31–36)
MCV RBC AUTO: 89.2 FL (ref 80–100)
MONOCYTES ABSOLUTE: 0.3 K/UL (ref 0–1.3)
MONOCYTES RELATIVE PERCENT: 4.2 %
NEUTROPHILS ABSOLUTE: 5.3 K/UL (ref 1.7–7.7)
NEUTROPHILS RELATIVE PERCENT: 70.2 %
PDW BLD-RTO: 13.5 % (ref 12.4–15.4)
PLATELET # BLD: 276 K/UL (ref 135–450)
PMV BLD AUTO: 9.4 FL (ref 5–10.5)
POTASSIUM SERPL-SCNC: 4.3 MMOL/L (ref 3.5–5.1)
RBC # BLD: 4.15 M/UL (ref 4–5.2)
SEDIMENTATION RATE, ERYTHROCYTE: 93 MM/HR (ref 0–30)
SODIUM BLD-SCNC: 136 MMOL/L (ref 136–145)
TOTAL PROTEIN: 7.4 G/DL (ref 6.4–8.2)
URIC ACID, SERUM: 5.3 MG/DL (ref 2.6–6)
WBC # BLD: 7.6 K/UL (ref 4–11)

## 2021-08-17 PROCEDURE — 84550 ASSAY OF BLOOD/URIC ACID: CPT

## 2021-08-17 PROCEDURE — 86140 C-REACTIVE PROTEIN: CPT

## 2021-08-17 PROCEDURE — 36415 COLL VENOUS BLD VENIPUNCTURE: CPT

## 2021-08-17 PROCEDURE — 85652 RBC SED RATE AUTOMATED: CPT

## 2021-08-17 PROCEDURE — 80076 HEPATIC FUNCTION PANEL: CPT

## 2021-08-17 PROCEDURE — 85025 COMPLETE CBC W/AUTO DIFF WBC: CPT

## 2021-08-17 PROCEDURE — 80048 BASIC METABOLIC PNL TOTAL CA: CPT

## 2021-08-19 DIAGNOSIS — D50.9 IRON DEFICIENCY ANEMIA, UNSPECIFIED IRON DEFICIENCY ANEMIA TYPE: ICD-10-CM

## 2021-08-19 RX ORDER — FERROUS SULFATE 325(65) MG
TABLET ORAL
Qty: 180 TABLET | Refills: 0 | Status: SHIPPED | OUTPATIENT
Start: 2021-08-19 | End: 2021-12-15

## 2021-08-24 RX ORDER — INSULIN ASPART 100 [IU]/ML
INJECTION, SOLUTION INTRAVENOUS; SUBCUTANEOUS
Qty: 30 ML | Refills: 0 | Status: SHIPPED | OUTPATIENT
Start: 2021-08-24 | End: 2021-11-02 | Stop reason: SDUPTHER

## 2021-08-31 ENCOUNTER — OFFICE VISIT (OUTPATIENT)
Dept: INTERNAL MEDICINE CLINIC | Age: 51
End: 2021-08-31
Payer: COMMERCIAL

## 2021-08-31 ENCOUNTER — TELEPHONE (OUTPATIENT)
Dept: CARDIOLOGY CLINIC | Age: 51
End: 2021-08-31

## 2021-08-31 ENCOUNTER — HOSPITAL ENCOUNTER (OUTPATIENT)
Age: 51
Setting detail: SPECIMEN
Discharge: HOME | End: 2021-08-31
Payer: COMMERCIAL

## 2021-08-31 ENCOUNTER — TELEPHONE (OUTPATIENT)
Dept: INTERNAL MEDICINE CLINIC | Age: 51
End: 2021-08-31

## 2021-08-31 VITALS
OXYGEN SATURATION: 93 % | DIASTOLIC BLOOD PRESSURE: 70 MMHG | BODY MASS INDEX: 37.3 KG/M2 | HEART RATE: 80 BPM | SYSTOLIC BLOOD PRESSURE: 106 MMHG | WEIGHT: 191 LBS

## 2021-08-31 DIAGNOSIS — Z71.89 COMPLEX CARE COORDINATION: ICD-10-CM

## 2021-08-31 DIAGNOSIS — I50.32 CHRONIC HEART FAILURE WITH PRESERVED EJECTION FRACTION (HCC): Chronic | ICD-10-CM

## 2021-08-31 DIAGNOSIS — M06.9 RHEUMATOID ARTHRITIS INVOLVING MULTIPLE SITES, UNSPECIFIED WHETHER RHEUMATOID FACTOR PRESENT (HCC): ICD-10-CM

## 2021-08-31 DIAGNOSIS — D49.2 SKIN NEOPLASM: Primary | ICD-10-CM

## 2021-08-31 DIAGNOSIS — Z60.3 IMMIGRANT WITH LANGUAGE DIFFICULTY: ICD-10-CM

## 2021-08-31 DIAGNOSIS — Z71.85 VACCINE COUNSELING: ICD-10-CM

## 2021-08-31 DIAGNOSIS — M79.671 RIGHT FOOT PAIN: ICD-10-CM

## 2021-08-31 DIAGNOSIS — G89.29 OTHER CHRONIC PAIN: Chronic | ICD-10-CM

## 2021-08-31 LAB
ANION GAP SERPL CALCULATED.3IONS-SCNC: 14 MMOL/L (ref 3–16)
BUN BLDV-MCNC: 51 MG/DL (ref 7–20)
CALCIUM SERPL-MCNC: 9.6 MG/DL (ref 8.3–10.6)
CHLORIDE BLD-SCNC: 93 MMOL/L (ref 99–110)
CO2: 30 MMOL/L (ref 21–32)
CREAT SERPL-MCNC: 1.3 MG/DL (ref 0.6–1.1)
GFR AFRICAN AMERICAN: 52
GFR NON-AFRICAN AMERICAN: 43
GLUCOSE BLD-MCNC: 274 MG/DL (ref 70–99)
POTASSIUM SERPL-SCNC: 4.4 MMOL/L (ref 3.5–5.1)
PRO-BNP: 261 PG/ML (ref 0–124)
SODIUM BLD-SCNC: 137 MMOL/L (ref 136–145)

## 2021-08-31 PROCEDURE — G8417 CALC BMI ABV UP PARAM F/U: HCPCS | Performed by: INTERNAL MEDICINE

## 2021-08-31 PROCEDURE — 3017F COLORECTAL CA SCREEN DOC REV: CPT | Performed by: INTERNAL MEDICINE

## 2021-08-31 PROCEDURE — 83880 ASSAY OF NATRIURETIC PEPTIDE: CPT

## 2021-08-31 PROCEDURE — G8427 DOCREV CUR MEDS BY ELIG CLIN: HCPCS | Performed by: INTERNAL MEDICINE

## 2021-08-31 PROCEDURE — 36415 COLL VENOUS BLD VENIPUNCTURE: CPT

## 2021-08-31 PROCEDURE — 99214 OFFICE O/P EST MOD 30 MIN: CPT | Performed by: INTERNAL MEDICINE

## 2021-08-31 PROCEDURE — 1036F TOBACCO NON-USER: CPT | Performed by: INTERNAL MEDICINE

## 2021-08-31 PROCEDURE — 80048 BASIC METABOLIC PNL TOTAL CA: CPT

## 2021-08-31 SDOH — SOCIAL STABILITY - SOCIAL INSECURITY: ACCULTURATION DIFFICULTY: Z60.3

## 2021-08-31 NOTE — TELEPHONE ENCOUNTER
Spoke with Andressa Franco and advised her of the message below . She voiced understanding.  Call complete

## 2021-08-31 NOTE — PROGRESS NOTES
Patient: Sonny Mayes is a 45 y.o. female who presents today with the following Chief Complaint(s):  Chief Complaint   Patient presents with    Check-Up       HPI     Here today for follow up. Is accompanied by her  who helps with history. Last week she felt poorly, was gaining weight. Legs are feeling heavy and swollen. Does increase her metolazone as needed for weight gain. Also has a knot on her right foot. Is painful. Has established with Dr. Stephanie Holcomb for rheumatology to keep all specialists and testing within [facility]. Did get her COVID vaccines in March, will need a booster soon. Is getting home PT and is working very well for her. Helping her with her pain issues. Has a spot on her left arm that sometimes leaks fluid. Lab Results   Component Value Date    CREATININE 1.3 (H) 08/31/2021    BUN 51 (H) 08/31/2021     08/31/2021    K 4.4 08/31/2021    CL 93 (L) 08/31/2021    CO2 30 08/31/2021     Labs Renal Latest Ref Rng & Units 8/31/2021 8/17/2021 8/10/2021 7/31/2021 7/20/2021   BUN 7 - 20 mg/dL 51(H) 54(H) 59(H) 44(H) 62(H)   Cr 0.6 - 1.1 mg/dL 1.3(H) 1.6(H) 1.6(H) 1.4(H) 1.6(H)   K 3.5 - 5.1 mmol/L 4.4 4.3 4.4 4.4 4.7   Na 136 - 145 mmol/L 137 136 133(L) 133(L) 128(L)     Ref Range & Units Status Collected Lab     Pro-BNP 261High   0 - 124 pg/mL Final 08/31/2021 12:04 PM Liz Sainz labs- ESR 95, CRP 8.0 on 8/17/21.     Lab Results   Component Value Date    ALT 13 08/17/2021    AST 13 (L) 08/17/2021    ALKPHOS 137 (H) 08/17/2021    BILITOT <0.2 08/17/2021     Lab Results   Component Value Date    WBC 7.6 08/17/2021    HGB 12.0 08/17/2021    HCT 37.0 08/17/2021    MCV 89.2 08/17/2021     08/17/2021     Lab Results   Component Value Date    LABA1C 8.6 02/02/2021     Lab Results   Component Value Date    .1 02/02/2021     Lab Results   Component Value Date    LABA1C 8.6 02/02/2021    LABA1C 8.4 01/26/2021    LABA1C 8.5 Marital status:      Spouse name: Emely Ashby Number of children: 3    Years of education: Not on file    Highest education level: Not on file   Occupational History    Occupation: disabled   Tobacco Use    Smoking status: Never Smoker    Smokeless tobacco: Never Used   Vaping Use    Vaping Use: Never used   Substance and Sexual Activity    Alcohol use: No    Drug use: No    Sexual activity: Not Currently   Other Topics Concern    Not on file   Social History Narrative    Not on file     Social Determinants of Health     Financial Resource Strain:     Difficulty of Paying Living Expenses:    Food Insecurity:     Worried About Running Out of Food in the Last Year:     [address] in the Last Year:    Transportation Needs:     Lack of Transportation (Medical):      Lack of Transportation (Non-Medical):    Physical Activity:     Days of Exercise per Week:     Minutes of Exercise per Session:    Stress:     Feeling of Stress :    Social Connections:     Frequency of Communication with Friends and Family:     Frequency of Social Gatherings with Friends and Family:     Attends Orthodox Services:     Active Member of Clubs or Organizations:     Attends Club or Organization Meetings:     Marital Status:    Intimate Partner Violence:     Fear of Current or Ex-Partner:     Emotionally Abused:     Physically Abused:     Sexually Abused:      Family History   Problem Relation Age of Onset    Asthma Other     Cancer Other     Depression Other     Diabetes Other     Hypertension Other     High Cholesterol Other     Migraines Other     Heart Attack Father 72         of MI    High Blood Pressure Mother     Diabetes Mother         Outpatient Medications Prior to Visit   Medication Sig Dispense Refill    insulin aspart (NOVOLOG FLEXPEN) 100 UNIT/ML injection pen INJECT 30 TO 50 UNITS INTO THE SKIN THREE TIMES DAILY BEFORE MEALS 30 mL 0    FEROSUL 325 (65 Fe) MG tablet TAKE 1 TABLET BY MOUTH TWO TIMES A DAY WITH MEALS 180 tablet 0    polyethylene glycol (GLYCOLAX) 17 GM/SCOOP powder TAKE 17 GRAMS (1 CAPFUL) BY MOUTH NIGHTLY 510 g 0    montelukast (SINGULAIR) 10 MG tablet TAKE 1 TABLET BY MOUTH EVERY DAY  30 tablet 0    metOLazone (ZAROXOLYN) 5 MG tablet Take 5 mg by mouth Twice a Week      LINZESS 290 MCG CAPS capsule TAKE 1 CAPSULE BY MOUTH ONE TIME A DAY FOR 90 DAYS      REXULTI 1 MG TABS tablet TAKE 1 TABLET BY MOUTH EVERY NIGHT      sulfaSALAzine (AZULFIDINE) 500 MG tablet Take 1 tablet by mouth 2 times daily 60 tablet 1    Febuxostat 80 MG TABS Take 80 mg by mouth daily 30 tablet 1    leflunomide (ARAVA) 10 MG tablet Take 1 tablet by mouth daily 30 tablet 1    colchicine (COLCRYS) 0.6 MG tablet Take 1 tablet by mouth every other day 15 tablet 2    levothyroxine (SYNTHROID) 150 MCG tablet TAKE 1 TABLET BY MOUTH IN THE MORNING (BEFORE BREAKFAST) 90 tablet 1    methocarbamol (ROBAXIN-750) 750 MG tablet Take 1 tablet by mouth 3 times daily as needed (pain and spasm) 30 tablet 0    AMITIZA 24 MCG capsule TAKE 1 CAPSULE BY MOUTH TWO TIMES A DAY WITH MEALS 60 capsule 5    omeprazole (PRILOSEC) 20 MG delayed release capsule TAKE 1 CAPSULE BY MOUTH TWO TIMES A DAY  60 capsule 5    nitroGLYCERIN (NITROSTAT) 0.4 MG SL tablet PLACE ONE TABLET UNDER TONGUE AS NEEDED FOR CHEST PAIN, MAY REPEAT EVERY 5 MINUTES AS NEEDED UP TO A MAX OF 3 TABLETS. IF NO RELIEF AFTER 1 DOSE, CALL 911. 25 tablet 0    Blood Glucose Monitoring Suppl (ONETOUCH VERIO) w/Device KIT Use to check glucose 4 times daily.  1 kit 0    Ertugliflozin L-PyroglutamicAc (STEGLATRO) 5 MG TABS TAKE 1 TABLET BY MOUTH ONCE DAILY 90 tablet 1    spironolactone (ALDACTONE) 50 MG tablet TAKE 2 TABLETS BY MOUTH IN THE MORNING AND THEN TAKE 1 TABLET BY MOUTH IN THE EVENING 270 tablet 3    torsemide (DEMADEX) 100 MG tablet TAKE 1 TABLET BY MOUTH IN THE MORNING AND 1/2 TABLET BY MOUTH IN THE EVENING 135 tablet 3    blood glucose test strips (ONETOUCH VERIO) strip Test 4 times daily. 200 each 5    Insulin Pen Needle (B-D UF III MINI PEN NEEDLES) 31G X 5 MM MISC use five times daily with insulin 200 each 5    vitamin D3 (CHOLECALCIFEROL) 25 MCG (1000 UT) TABS tablet TAKE 3 TABLETS BY MOUTH ONE TIME A DAY 90 tablet 5    Insulin Degludec (TRESIBA FLEXTOUCH) 200 UNIT/ML SOPN inject 200 units subcutaneously once daily (Patient taking differently: 90 Units 2 times daily inject 200 units subcutaneously once daily) 20 pen 3    simvastatin (ZOCOR) 20 MG tablet TAKE 1 TABLET BY MOUTH EVERY DAY AT NIGHT 90 tablet 3    metoprolol tartrate (LOPRESSOR) 25 MG tablet TAKE 1 TABLET BY MOUTH TWO TIMES A DAY  180 tablet 3    ranolazine (RANEXA) 500 MG extended release tablet TAKE 1 TABLET BY MOUTH TWO TIMES A DAY  180 tablet 3    senna-docusate (PERICOLACE) 8.6-50 MG per tablet Take 2 tablets by mouth 2 times daily 360 tablet 3    senna (SENOKOT) 8.6 MG tablet Take 1 tablet by mouth 2 times daily 60 tablet 11    butalbital-acetaminophen-caffeine (FIORICET, ESGIC) -40 MG per tablet Take 1 tablet by mouth every 6 hours as needed for Headaches 30 tablet 1    nystatin (MYCOSTATIN) 740209 UNIT/ML suspension Take 5 mLs by mouth 4 times daily 500 mL 1    Magic Mouthwash (MIRACLE MOUTHWASH) Swish and spit 5 mLs 4 times daily as needed for Irritation or Pain 300 mL 2    traZODone (DESYREL) 50 MG tablet Take 1 tablet by mouth nightly Take it on the day of sleep study 1 tablet 0    loratadine (CLARITIN) 10 MG tablet TAKE 1 TABLET BY MOUTH ONE TIME A DAY  30 tablet 5    lidocaine viscous hcl (XYLOCAINE) 2 % SOLN solution Take 5 mLs by mouth every 3 hours as needed for Irritation or Pain 100 mL 2    ketoconazole (NIZORAL) 2 % shampoo Apply topically daily as needed. 120 mL 1    diazePAM (VALIUM) 5 MG tablet Take 5 mg by mouth 2 times daily as needed for Anxiety.        cariprazine hcl (VRAYLAR) 1.5 MG capsule Take 3 mg by mouth daily       breath sounds. Musculoskeletal:      Right lower leg: Tenderness present. No deformity or bony tenderness. 1+ Edema present. Left lower leg: No deformity, tenderness or bony tenderness. 1+ Edema present. Legs:    Lymphadenopathy:      Cervical: No cervical adenopathy. Neurological:      General: No focal deficit present. Mental Status: She is alert and oriented to person, place, and time. Psychiatric:         Mood and Affect: Mood normal.         Behavior: Behavior normal. Behavior is cooperative. ASSESSMENT/PLAN:    Problem List Items Addressed This Visit     Chronic heart failure with preserved ejection fraction (HCC) (Chronic)     Patient continues to follow with Dr. Valeria Shukla for cardiology. She remains on Aldactone 100 mg every morning/50 mg every afternoon and Demadex 100 mg every morning/ 50 mg every afternoon with Zaroxolyn twice weekly and as needed for weight gain. Her weight is up and she is more swollen and her  will give her an extra dose of Zaroxolyn tomorrow. She is not short of breath. Chronic pain (Chronic)      Patient is noticing improvement in pain and functionality with physical therapy. Continue with home physical therapy. Is following with pain management. Complex care coordination     Patient has multiple complex health issues including diabetes mellitus type 2 followed by Dr. Delores Marshall, congestive heart failure followed by Dr. Valeria Shukla, rheumatoid arthritis and gout now followed with Dr. Yamilet Rivera, history of breast cancer followed by Dr. Yanira Christopher, asthma followed by Dr. Jessica Joseph, chronic constipation and dysphagia followed by Dr. Jovanni Guerrero, and severe anxiety and depression followed by psychiatry. Specialist records are reviewed. Immigrant with language difficulty     Patient's  provides interpretation for patient. Patient has been more comfortable with this arrangement.          RA (rheumatoid arthritis) ([facility] [address]     Patient has established with Dr. Magan Rai. Right foot pain      Exam is unremarkable. She will bring to Dr. Jonathon Cedillo attention at her follow-up appointment. Suspect is related to swelling. Her  is going to try to get her bigger shoes. Skin neoplasm - Primary     Referral placed to dermatology. Relevant Orders    Jose C Tineo, [address] [facility], Dermatology, [place]    Vaccine counseling      Patient should get her COVID-23 booster in late September. She is immunocompromised due to treatment for rheumatoid arthritis.                Current Outpatient Medications   Medication Sig Dispense Refill    insulin aspart (NOVOLOG FLEXPEN) 100 UNIT/ML injection pen INJECT 30 TO 50 UNITS INTO THE SKIN THREE TIMES DAILY BEFORE MEALS 30 mL 0    FEROSUL 325 (65 Fe) MG tablet TAKE 1 TABLET BY MOUTH TWO TIMES A DAY WITH MEALS 180 tablet 0    polyethylene glycol (GLYCOLAX) 17 GM/SCOOP powder TAKE 17 GRAMS (1 CAPFUL) BY MOUTH NIGHTLY 510 g 0    montelukast (SINGULAIR) 10 MG tablet TAKE 1 TABLET BY MOUTH EVERY DAY  30 tablet 0    metOLazone (ZAROXOLYN) 5 MG tablet Take 5 mg by mouth Twice a Week      LINZESS 290 MCG CAPS capsule TAKE 1 CAPSULE BY MOUTH ONE TIME A DAY FOR 90 DAYS      REXULTI 1 MG TABS tablet TAKE 1 TABLET BY MOUTH EVERY NIGHT      sulfaSALAzine (AZULFIDINE) 500 MG tablet Take 1 tablet by mouth 2 times daily 60 tablet 1    Febuxostat 80 MG TABS Take 80 mg by mouth daily 30 tablet 1    leflunomide (ARAVA) 10 MG tablet Take 1 tablet by mouth daily 30 tablet 1    colchicine (COLCRYS) 0.6 MG tablet Take 1 tablet by mouth every other day 15 tablet 2    levothyroxine (SYNTHROID) 150 MCG tablet TAKE 1 TABLET BY MOUTH IN THE MORNING (BEFORE BREAKFAST) 90 tablet 1    methocarbamol (ROBAXIN-750) 750 MG tablet Take 1 tablet by mouth 3 times daily as needed (pain and spasm) 30 tablet 0    AMITIZA 24 MCG capsule TAKE 1 CAPSULE BY MOUTH TWO TIMES A DAY WITH MEALS 60 capsule 5    omeprazole (PRILOSEC) 20 MG delayed release capsule TAKE 1 CAPSULE BY MOUTH TWO TIMES A DAY  60 capsule 5    nitroGLYCERIN (NITROSTAT) 0.4 MG SL tablet PLACE ONE TABLET UNDER TONGUE AS NEEDED FOR CHEST PAIN, MAY REPEAT EVERY 5 MINUTES AS NEEDED UP TO A MAX OF 3 TABLETS. IF NO RELIEF AFTER 1 DOSE, CALL 911. 25 tablet 0    Blood Glucose Monitoring Suppl (ONETOUCH VERIO) w/Device KIT Use to check glucose 4 times daily. 1 kit 0    Ertugliflozin L-PyroglutamicAc (STEGLATRO) 5 MG TABS TAKE 1 TABLET BY MOUTH ONCE DAILY 90 tablet 1    spironolactone (ALDACTONE) 50 MG tablet TAKE 2 TABLETS BY MOUTH IN THE MORNING AND THEN TAKE 1 TABLET BY MOUTH IN THE EVENING 270 tablet 3    torsemide (DEMADEX) 100 MG tablet TAKE 1 TABLET BY MOUTH IN THE MORNING AND 1/2 TABLET BY MOUTH IN THE EVENING 135 tablet 3    blood glucose test strips (ONETOUCH VERIO) strip Test 4 times daily.  200 each 5    Insulin Pen Needle (B-D UF III MINI PEN NEEDLES) 31G X 5 MM MISC use five times daily with insulin 200 each 5    vitamin D3 (CHOLECALCIFEROL) 25 MCG (1000 UT) TABS tablet TAKE 3 TABLETS BY MOUTH ONE TIME A DAY 90 tablet 5    Insulin Degludec (TRESIBA FLEXTOUCH) 200 UNIT/ML SOPN inject 200 units subcutaneously once daily (Patient taking differently: 90 Units 2 times daily inject 200 units subcutaneously once daily) 20 pen 3    simvastatin (ZOCOR) 20 MG tablet TAKE 1 TABLET BY MOUTH EVERY DAY AT NIGHT 90 tablet 3    metoprolol tartrate (LOPRESSOR) 25 MG tablet TAKE 1 TABLET BY MOUTH TWO TIMES A DAY  180 tablet 3    ranolazine (RANEXA) 500 MG extended release tablet TAKE 1 TABLET BY MOUTH TWO TIMES A DAY  180 tablet 3    senna-docusate (PERICOLACE) 8.6-50 MG per tablet Take 2 tablets by mouth 2 times daily 360 tablet 3    senna (SENOKOT) 8.6 MG tablet Take 1 tablet by mouth 2 times daily 60 tablet 11    butalbital-acetaminophen-caffeine (FIORICET, ESGIC) -40 MG per tablet Take 1 tablet by mouth every 6 hours as needed for Headaches 30 tablet 1    nystatin (MYCOSTATIN) 805727 UNIT/ML suspension Take 5 mLs by mouth 4 times daily 500 mL 1    Magic Mouthwash (MIRACLE MOUTHWASH) Swish and spit 5 mLs 4 times daily as needed for Irritation or Pain 300 mL 2    traZODone (DESYREL) 50 MG tablet Take 1 tablet by mouth nightly Take it on the day of sleep study 1 tablet 0    loratadine (CLARITIN) 10 MG tablet TAKE 1 TABLET BY MOUTH ONE TIME A DAY  30 tablet 5    lidocaine viscous hcl (XYLOCAINE) 2 % SOLN solution Take 5 mLs by mouth every 3 hours as needed for Irritation or Pain 100 mL 2    ketoconazole (NIZORAL) 2 % shampoo Apply topically daily as needed. 120 mL 1    diazePAM (VALIUM) 5 MG tablet Take 5 mg by mouth 2 times daily as needed for Anxiety.  cariprazine hcl (VRAYLAR) 1.5 MG capsule Take 3 mg by mouth daily       pregabalin (LYRICA) 50 MG capsule Take 50 mg by mouth 2 times daily.  OXYGEN Inhale 2 L into the lungs nightly Sometimes with activity      oxyCODONE (OXYCONTIN) 20 MG extended release tablet Take 20 mg by mouth every 12 hours.  aspirin 81 MG EC tablet Take 81 mg by mouth daily      benztropine (COGENTIN) 1 MG tablet Take 1 mg by mouth nightly       folic acid (FOLVITE) 1 MG tablet Take 1 mg by mouth daily      oxyCODONE-acetaminophen (PERCOCET)  MG per tablet Take 1 tablet by mouth every 4 hours as needed for Pain . Earliest Fill Date: 6/8/17 100 tablet 0    duloxetine (CYMBALTA) 60 MG capsule Take 60 mg by mouth 2 times daily. No current facility-administered medications for this visit. Return in about 3 months (around 11/30/2021).

## 2021-08-31 NOTE — PATIENT INSTRUCTIONS
Get Shingrix after you complete your COVID vaccine. You are on medications that suppress your immune system and you should get the booster in September. Please get your flu vaccine in September/October.

## 2021-08-31 NOTE — TELEPHONE ENCOUNTER
[facility] is re-certifying this patient and needs to know if [facility] still wants patient to have BMP and BNP every 2 weeks like before.

## 2021-08-31 NOTE — TELEPHONE ENCOUNTER
Denzel Arambula with [facility] is calling for verbal orders for recertification for skilled nursing. She states they can also draw labs if needed.

## 2021-09-01 ENCOUNTER — TELEPHONE (OUTPATIENT)
Dept: CARDIOLOGY CLINIC | Age: 51
End: 2021-09-01

## 2021-09-02 ENCOUNTER — TELEPHONE (OUTPATIENT)
Dept: CARDIOLOGY CLINIC | Age: 51
End: 2021-09-02

## 2021-09-02 ENCOUNTER — TELEPHONE (OUTPATIENT)
Dept: INTERNAL MEDICINE CLINIC | Age: 51
End: 2021-09-02

## 2021-09-02 DIAGNOSIS — R30.0 DYSURIA: Primary | ICD-10-CM

## 2021-09-02 NOTE — TELEPHONE ENCOUNTER
----- Message from Jerome Fisher MD sent at 9/1/2021  3:00 PM EDT -----  Call patient. Labs look good. No changes.   ARETHA

## 2021-09-02 NOTE — TELEPHONE ENCOUNTER
Kenna Esposito from [facility] called. She is complaining of burning while urination since yesterday. She is asking to get order for U/a C&S. The pt's  will take her somewhere to complete. Please advise. No other symptoms. Please call the patient and advise.

## 2021-09-05 PROBLEM — M79.671 RIGHT FOOT PAIN: Status: ACTIVE | Noted: 2021-09-05

## 2021-09-05 PROBLEM — Z71.85 VACCINE COUNSELING: Status: ACTIVE | Noted: 2021-09-05

## 2021-09-05 PROBLEM — D49.2 SKIN NEOPLASM: Status: ACTIVE | Noted: 2021-09-05

## 2021-09-05 NOTE — ASSESSMENT & PLAN NOTE
Patient continues to follow with Dr. Minal Galvez for cardiology. She remains on Aldactone 100 mg every morning/50 mg every afternoon and Demadex 100 mg every morning/ 50 mg every afternoon with Zaroxolyn twice weekly and as needed for weight gain. Her weight is up and she is more swollen and her  will give her an extra dose of Zaroxolyn tomorrow. She is not short of breath.

## 2021-09-05 NOTE — ASSESSMENT & PLAN NOTE
Patient is noticing improvement in pain and functionality with physical therapy. Continue with home physical therapy. Is following with pain management.

## 2021-09-05 NOTE — ASSESSMENT & PLAN NOTE
Exam is unremarkable. She will bring to Dr. Shauna Kenney attention at her follow-up appointment. Suspect is related to swelling. Her  is going to try to get her bigger shoes.

## 2021-09-05 NOTE — ASSESSMENT & PLAN NOTE
Patient's  provides interpretation for patient. Patient has been more comfortable with this arrangement.

## 2021-09-05 NOTE — ASSESSMENT & PLAN NOTE
Patient should get her COVID-19 booster in late September. She is immunocompromised due to treatment for rheumatoid arthritis.

## 2021-09-05 NOTE — ASSESSMENT & PLAN NOTE
Patient has multiple complex health issues including diabetes mellitus type 2 followed by Dr. Nakita Shaver, congestive heart failure followed by Dr. Abebe Boudreaux, rheumatoid arthritis and gout now followed with Dr. Dariana Argueta, history of breast cancer followed by Dr. Mitchell Zuniga, asthma followed by Dr. Chelly Molina, chronic constipation and dysphagia followed by Dr. Ekaterina Garcia, and severe anxiety and depression followed by psychiatry. Specialist records are reviewed.

## 2021-09-08 ENCOUNTER — TELEPHONE (OUTPATIENT)
Dept: CARDIOLOGY CLINIC | Age: 51
End: 2021-09-08

## 2021-09-08 NOTE — TELEPHONE ENCOUNTER
Calling to report the pt has gained seven pounds since 9/2/21. Took Metolazone 5 mg 9/7/21, and has taken Torsemide 100 mg, and Spironolactone 50 mg, today. no increased SOB, same amount of swelling in her legs.

## 2021-09-10 ENCOUNTER — TELEPHONE (OUTPATIENT)
Dept: INTERNAL MEDICINE CLINIC | Age: 51
End: 2021-09-10

## 2021-09-10 NOTE — TELEPHONE ENCOUNTER
Lacie Oppenheim from [facility] called. She was supposed to go out and evaluate the pt for physical therapy last week. Pt asked that they push out the evaluation to this week d/t MD appt. She called to get a verbal order to complete eval today. Order given.

## 2021-09-14 ENCOUNTER — TELEPHONE (OUTPATIENT)
Dept: CARDIOLOGY CLINIC | Age: 51
End: 2021-09-14

## 2021-09-15 ENCOUNTER — TELEPHONE (OUTPATIENT)
Dept: INTERNAL MEDICINE CLINIC | Age: 51
End: 2021-09-15

## 2021-09-15 NOTE — TELEPHONE ENCOUNTER
Jory Gracia with [facility] is requesting verbal order to continue physical therapy 2 times a week for 3 weeks and 1 time a week for 1 week.

## 2021-09-16 ENCOUNTER — TELEPHONE (OUTPATIENT)
Dept: INTERNAL MEDICINE CLINIC | Age: 51
End: 2021-09-16

## 2021-09-16 NOTE — TELEPHONE ENCOUNTER
Amparo Holly from a [facility] care called to request verbal order for continued SN for patient.

## 2021-09-17 ENCOUNTER — HOSPITAL ENCOUNTER (OUTPATIENT)
Age: 51
Setting detail: SPECIMEN
Discharge: HOME | End: 2021-09-17
Payer: COMMERCIAL

## 2021-09-17 ENCOUNTER — TELEPHONE (OUTPATIENT)
Dept: CARDIOLOGY CLINIC | Age: 51
End: 2021-09-17

## 2021-09-17 DIAGNOSIS — I50.32 CHRONIC HEART FAILURE WITH PRESERVED EJECTION FRACTION (HCC): Primary | ICD-10-CM

## 2021-09-17 LAB
ANION GAP SERPL CALCULATED.3IONS-SCNC: 11 MMOL/L (ref 3–16)
BUN BLDV-MCNC: <2 MG/DL (ref 7–20)
CALCIUM SERPL-MCNC: 9.2 MG/DL (ref 8.3–10.6)
CHLORIDE BLD-SCNC: 87 MMOL/L (ref 99–110)
CO2: 32 MMOL/L (ref 21–32)
CREAT SERPL-MCNC: 2.1 MG/DL (ref 0.6–1.1)
GFR AFRICAN AMERICAN: 30
GFR NON-AFRICAN AMERICAN: 25
GLUCOSE BLD-MCNC: 481 MG/DL (ref 70–99)
POTASSIUM SERPL-SCNC: 4.9 MMOL/L (ref 3.5–5.1)
PRO-BNP: 195 PG/ML (ref 0–124)
SODIUM BLD-SCNC: 130 MMOL/L (ref 136–145)

## 2021-09-17 PROCEDURE — 80048 BASIC METABOLIC PNL TOTAL CA: CPT

## 2021-09-17 PROCEDURE — 36415 COLL VENOUS BLD VENIPUNCTURE: CPT

## 2021-09-17 PROCEDURE — 83880 ASSAY OF NATRIURETIC PEPTIDE: CPT

## 2021-09-17 NOTE — TELEPHONE ENCOUNTER
She needs to have her blood work rechecked  Her BUN<2??  This cant be correct  Her blood sugar was 481 which is a big problem

## 2021-09-17 NOTE — TELEPHONE ENCOUNTER
Spoke to  regarding labs being elevated. /71. Wt 194. Down 2 pounds from yesterday. She is taking torsemide 100mg in the AM and 50mg in the PM  Metolazone 5mg twice a week. Took one yesterday. Spironolactone 100mg in the AM and 50mg in the PM.        She is drinking 1 cup of water and eats about 4 cups of ice a day. Please advise. See blood work.

## 2021-09-17 NOTE — TELEPHONE ENCOUNTER
Spoke to . He is aware that blood needs to be redrawn. Called [facility] and answering service is on. Left message with staff at [facility].

## 2021-09-21 ENCOUNTER — APPOINTMENT (RX ONLY)
Dept: URBAN - METROPOLITAN AREA CLINIC 170 | Facility: CLINIC | Age: 51
Setting detail: DERMATOLOGY
End: 2021-09-21

## 2021-09-21 ENCOUNTER — HOSPITAL ENCOUNTER (OUTPATIENT)
Age: 51
Setting detail: SPECIMEN
Discharge: HOME | End: 2021-09-21
Payer: COMMERCIAL

## 2021-09-21 DIAGNOSIS — L82.1 OTHER SEBORRHEIC KERATOSIS: ICD-10-CM

## 2021-09-21 PROBLEM — D48.5 NEOPLASM OF UNCERTAIN BEHAVIOR OF SKIN: Status: ACTIVE | Noted: 2021-09-21

## 2021-09-21 LAB
ANION GAP SERPL CALCULATED.3IONS-SCNC: 12 MMOL/L (ref 3–16)
BUN BLDV-MCNC: 58 MG/DL (ref 7–20)
CALCIUM SERPL-MCNC: 10.2 MG/DL (ref 8.3–10.6)
CHLORIDE BLD-SCNC: 93 MMOL/L (ref 99–110)
CO2: 32 MMOL/L (ref 21–32)
CREAT SERPL-MCNC: 1.6 MG/DL (ref 0.6–1.1)
GFR AFRICAN AMERICAN: 41
GFR NON-AFRICAN AMERICAN: 34
GLUCOSE BLD-MCNC: 92 MG/DL (ref 70–99)
POTASSIUM SERPL-SCNC: 4.3 MMOL/L (ref 3.5–5.1)
PRO-BNP: 176 PG/ML (ref 0–124)
SODIUM BLD-SCNC: 137 MMOL/L (ref 136–145)

## 2021-09-21 PROCEDURE — 83880 ASSAY OF NATRIURETIC PEPTIDE: CPT

## 2021-09-21 PROCEDURE — ? BIOPSY BY SHAVE METHOD

## 2021-09-21 PROCEDURE — ? ADDITIONAL NOTES

## 2021-09-21 PROCEDURE — 11102 TANGNTL BX SKIN SINGLE LES: CPT

## 2021-09-21 PROCEDURE — 80048 BASIC METABOLIC PNL TOTAL CA: CPT

## 2021-09-21 PROCEDURE — 36415 COLL VENOUS BLD VENIPUNCTURE: CPT

## 2021-09-21 ASSESSMENT — LOCATION SIMPLE DESCRIPTION DERM: LOCATION SIMPLE: LEFT POSTERIOR UPPER ARM

## 2021-09-21 ASSESSMENT — LOCATION DETAILED DESCRIPTION DERM: LOCATION DETAILED: LEFT DISTAL POSTERIOR UPPER ARM

## 2021-09-21 ASSESSMENT — LOCATION ZONE DERM: LOCATION ZONE: ARM

## 2021-09-21 NOTE — PROCEDURE: MIPS QUALITY
Quality 431: Preventive Care And Screening: Unhealthy Alcohol Use - Screening: Patient not identified as an unhealthy alcohol user when screened for unhealthy alcohol use using a systematic screening method
Quality 402: Tobacco Use And Help With Quitting Among Adolescents: Patient screened for tobacco and never smoked
Detail Level: Detailed
Quality 130: Documentation Of Current Medications In The Medical Record: Current Medications Documented

## 2021-09-21 NOTE — PROCEDURE: BIOPSY BY SHAVE METHOD
Detail Level: Detailed
Depth Of Biopsy: dermis
Was A Bandage Applied: Yes
Size Of Lesion In Cm: 0
Anticipated Plan (Based On Presumed Biopsy Results): Call with results
Biopsy Type: H and E
Biopsy Method: Dermablade
Anesthesia Type: 1% Xylocaine with [id number] epinephrine and sodium bicarbonate
Anesthesia Volume In Cc (Will Not Render If 0): 1
Hemostasis: Aluminum Chloride and Electrocautery
Wound Care: Aquaphor
Dressing: Band-Aid
Destruction After The Procedure: No
Type Of Destruction Used: Curettage
Curettage Text: The wound bed was treated with curettage after the biopsy was performed.
Cryotherapy Text: The wound bed was treated with cryotherapy after the biopsy was performed.
Electrodesiccation Text: The wound bed was treated with electrodesiccation after the biopsy was performed.
Electrodesiccation And Curettage Text: The wound bed was treated with electrodesiccation and curettage after the biopsy was performed.
Silver Nitrate Text: The wound bed was treated with silver nitrate after the biopsy was performed.
Lab: -[id number]
Lab Facility: [id number]
Consent: Written consent was obtained and risks were reviewed including but not limited to scarring, infection, bleeding, scabbing, incomplete removal, nerve damage and allergy to anesthesia.
Post-Care Instructions: I reviewed with the patient in detail post-care instructions. Patient is to keep the biopsy site dry overnight, and then apply bacitracin twice daily until healed. Patient may apply hydrogen peroxide soaks to remove any crusting.
Notification Instructions: Patient will be notified of biopsy results. However, patient instructed to call the office if not contacted within 2 weeks.
Billing Type: Third-Party Bill
Information: Selecting Yes will display possible errors in your note based on the variables you have selected. This validation is only offered as a suggestion for you. PLEASE NOTE THAT THE VALIDATION TEXT WILL BE REMOVED WHEN YOU FINALIZE YOUR NOTE. IF YOU WANT TO FAX A PRELIMINARY NOTE YOU WILL NEED TO TOGGLE THIS TO 'NO' IF YOU DO NOT WANT IT IN YOUR FAXED NOTE.

## 2021-09-21 NOTE — TELEPHONE ENCOUNTER
Spoke to Leda Galindo from [facility] . Pt's  refused lab draw on 9/18 per Leda Galindo. Leda Galindo will have someone draw her labs today. Called and spoke to  who states he did not want labs drawn because we were closed on the weekend. He was informed [facility] will send someone today for labs and he is agreeable.

## 2021-09-23 ENCOUNTER — TELEPHONE (OUTPATIENT)
Dept: CARDIOLOGY CLINIC | Age: 51
End: 2021-09-23

## 2021-09-23 ENCOUNTER — TELEPHONE (OUTPATIENT)
Dept: ENDOCRINOLOGY | Age: 51
End: 2021-09-23

## 2021-09-23 ENCOUNTER — TELEPHONE (OUTPATIENT)
Dept: INTERNAL MEDICINE CLINIC | Age: 51
End: 2021-09-23

## 2021-09-23 RX ORDER — BLOOD SUGAR DIAGNOSTIC
STRIP MISCELLANEOUS
Qty: 200 EACH | Refills: 5 | Status: SHIPPED | OUTPATIENT
Start: 2021-09-23 | End: 2022-04-14

## 2021-09-23 NOTE — TELEPHONE ENCOUNTER
PT needs to have a refill of One Touch Verio flex test strips sent to [facility] in [place].   PT test up to 5 times a day

## 2021-09-23 NOTE — TELEPHONE ENCOUNTER
Malinda Huffman from [facility] calling---pt is wanting to know if you would start prescribing his Singular---doesn't want to go back to Dr Coco Vaca (on last fill Dr Coco Vaca said pt had to come to office before refilling any more) please call Malinda Huffman at [phone] to let her know if you will take over this or not. Thanks.

## 2021-09-23 NOTE — TELEPHONE ENCOUNTER
[facility] calling because patient called [facility] to get her spironolactone refilled and she was told that [facility] d/c'd it. The patient and her  were surprised by that because they thought she was still supposed to be taking spironolactone. There is a notation in her chart 9/17/21 by [facility] RN stating she spoke to  and that this patient was taking torsemide, metolazone, and spironolactone. Please call N to let her know if patient should be taking spironolactone because she has none and [facility] has no refills.

## 2021-09-23 NOTE — TELEPHONE ENCOUNTER
LMOM for [employer]. There has been no changes in pt's spironolactone. She is to continue 100mg morning and 50mg evening.     We have not received a request for refill; therefore, there has been no refusal of a refill request.

## 2021-09-24 ENCOUNTER — TELEPHONE (OUTPATIENT)
Dept: CARDIOLOGY CLINIC | Age: 51
End: 2021-09-24

## 2021-09-24 RX ORDER — MONTELUKAST SODIUM 10 MG/1
10 TABLET ORAL NIGHTLY
Qty: 30 TABLET | Refills: 5 | Status: SHIPPED | OUTPATIENT
Start: 2021-09-24 | End: 2022-03-15

## 2021-09-24 NOTE — TELEPHONE ENCOUNTER
[facility]N calling to let NPRG know that this patient gained 4 pounds over night. She has no increased SOB and no noticeable edema. Caregiver says there was no change in diet or fluid intake. Caregiver thinks the weight gain is because patient didn't use her oxygen last night. Patient is taking her metolazone today,.

## 2021-09-24 NOTE — TELEPHONE ENCOUNTER
I am fine to rx it, just needs a pulmonologist d/t severity of her asthma. Singulair has been sent to [place] x 6 months.  saw

## 2021-09-24 NOTE — TELEPHONE ENCOUNTER
Spoke with Tasha Mchugh and advised her of the message below per NPRG. She voiced understanding .  Call complete

## 2021-09-27 ENCOUNTER — TELEPHONE (OUTPATIENT)
Dept: CARDIOLOGY CLINIC | Age: 51
End: 2021-09-27

## 2021-09-27 NOTE — TELEPHONE ENCOUNTER
----- Message from Ollie Nichols MD sent at 9/26/2021 10:26 PM EDT -----  Call patient. Her labs look okay. No changes.   ARETHA

## 2021-09-27 NOTE — TELEPHONE ENCOUNTER
I spoke to patient with lab results per Dr Oni Campos . Patient verbalized understanding and  Advised patient to call back with any questions.

## 2021-09-28 ENCOUNTER — HOSPITAL ENCOUNTER (OUTPATIENT)
Age: 51
Setting detail: SPECIMEN
Discharge: HOME | End: 2021-09-28
Payer: COMMERCIAL

## 2021-09-28 LAB
ANION GAP SERPL CALCULATED.3IONS-SCNC: 12 MMOL/L (ref 3–16)
BUN BLDV-MCNC: 58 MG/DL (ref 7–20)
CALCIUM SERPL-MCNC: 9.5 MG/DL (ref 8.3–10.6)
CHLORIDE BLD-SCNC: 90 MMOL/L (ref 99–110)
CO2: 32 MMOL/L (ref 21–32)
CREAT SERPL-MCNC: 1.6 MG/DL (ref 0.6–1.1)
GFR AFRICAN AMERICAN: 41
GFR NON-AFRICAN AMERICAN: 34
GLUCOSE BLD-MCNC: 81 MG/DL (ref 70–99)
POTASSIUM SERPL-SCNC: 4.1 MMOL/L (ref 3.5–5.1)
PRO-BNP: 289 PG/ML (ref 0–124)
SODIUM BLD-SCNC: 134 MMOL/L (ref 136–145)

## 2021-09-28 PROCEDURE — 36415 COLL VENOUS BLD VENIPUNCTURE: CPT

## 2021-09-28 PROCEDURE — 83880 ASSAY OF NATRIURETIC PEPTIDE: CPT

## 2021-09-28 PROCEDURE — 80048 BASIC METABOLIC PNL TOTAL CA: CPT

## 2021-10-05 ENCOUNTER — TELEPHONE (OUTPATIENT)
Dept: CARDIOLOGY CLINIC | Age: 51
End: 2021-10-05

## 2021-10-05 NOTE — TELEPHONE ENCOUNTER
Vinay Crespo from [facility] called stating the patient has orders in the systems that needs to be sighed off on orders using the portal.    Pls advise thank you     Vinay Crespo   [phone]

## 2021-10-07 ENCOUNTER — TELEPHONE (OUTPATIENT)
Dept: CARDIOLOGY CLINIC | Age: 51
End: 2021-10-07

## 2021-10-07 ENCOUNTER — OFFICE VISIT (OUTPATIENT)
Dept: INTERNAL MEDICINE CLINIC | Age: 51
End: 2021-10-07
Payer: COMMERCIAL

## 2021-10-07 VITALS
DIASTOLIC BLOOD PRESSURE: 78 MMHG | OXYGEN SATURATION: 97 % | HEART RATE: 71 BPM | WEIGHT: 197 LBS | BODY MASS INDEX: 38.47 KG/M2 | SYSTOLIC BLOOD PRESSURE: 124 MMHG
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
## 2021-10-07 DIAGNOSIS — I50.22 SYSTOLIC CHF, CHRONIC (HCC): ICD-10-CM

## 2021-10-07 DIAGNOSIS — I10 ESSENTIAL HYPERTENSION: ICD-10-CM

## 2021-10-07 DIAGNOSIS — S41.152A DOG BITE OF LEFT UPPER EXTREMITY, INITIAL ENCOUNTER: Primary | ICD-10-CM

## 2021-10-07 DIAGNOSIS — I50.30 (HFPEF) HEART FAILURE WITH PRESERVED EJECTION FRACTION (HCC): Chronic | ICD-10-CM

## 2021-10-07 DIAGNOSIS — I25.10 CORONARY ARTERY DISEASE INVOLVING NATIVE CORONARY ARTERY OF NATIVE HEART WITHOUT ANGINA PECTORIS: Chronic | ICD-10-CM

## 2021-10-07 DIAGNOSIS — R06.02 SOB (SHORTNESS OF BREATH): Chronic | ICD-10-CM

## 2021-10-07 DIAGNOSIS — N28.9 RENAL INSUFFICIENCY: Chronic | ICD-10-CM

## 2021-10-07 PROCEDURE — G8427 DOCREV CUR MEDS BY ELIG CLIN: HCPCS | Performed by: NURSE PRACTITIONER

## 2021-10-07 PROCEDURE — 99214 OFFICE O/P EST MOD 30 MIN: CPT | Performed by: NURSE PRACTITIONER

## 2021-10-07 PROCEDURE — G8417 CALC BMI ABV UP PARAM F/U: HCPCS | Performed by: NURSE PRACTITIONER

## 2021-10-07 PROCEDURE — 90471 IMMUNIZATION ADMIN: CPT | Performed by: NURSE PRACTITIONER

## 2021-10-07 PROCEDURE — 90715 TDAP VACCINE 7 YRS/> IM: CPT | Performed by: NURSE PRACTITIONER

## 2021-10-07 PROCEDURE — 3017F COLORECTAL CA SCREEN DOC REV: CPT | Performed by: NURSE PRACTITIONER

## 2021-10-07 PROCEDURE — G8482 FLU IMMUNIZE ORDER/ADMIN: HCPCS | Performed by: NURSE PRACTITIONER

## 2021-10-07 PROCEDURE — 1036F TOBACCO NON-USER: CPT | Performed by: NURSE PRACTITIONER

## 2021-10-07 RX ORDER — AMOXICILLIN AND CLAVULANATE POTASSIUM 875; 125 MG/1; MG/1
1 TABLET, FILM COATED ORAL 2 TIMES DAILY
Qty: 14 TABLET | Refills: 0 | Status: SHIPPED | OUTPATIENT
Start: 2021-10-07 | End: 2021-10-14

## 2021-10-07 SDOH — ECONOMIC STABILITY: FOOD INSECURITY: WITHIN THE PAST 12 MONTHS, THE FOOD YOU BOUGHT JUST DIDN'T LAST AND YOU DIDN'T HAVE MONEY TO GET MORE.: NEVER TRUE

## 2021-10-07 SDOH — ECONOMIC STABILITY: FOOD INSECURITY: WITHIN THE PAST 12 MONTHS, YOU WORRIED THAT YOUR FOOD WOULD RUN OUT BEFORE YOU GOT MONEY TO BUY MORE.: NEVER TRUE

## 2021-10-07 ASSESSMENT — SOCIAL DETERMINANTS OF HEALTH (SDOH): HOW HARD IS IT FOR YOU TO PAY FOR THE VERY BASICS LIKE FOOD, HOUSING, MEDICAL CARE, AND HEATING?: NOT HARD AT ALL

## 2021-10-07 NOTE — PROGRESS NOTES
10/7/21     Chief Complaint   Patient presents with    Animal Bite     Pt. was bit by her own dog yesterday      HPI     Pt is here for a dog bite that happened yesterday to her left hand and wrist. Tried to break the dogs up when they were playing. It is an 7 month old puppy - up to date on vaccines including rabies. Denies worsening swelling compared to chronic lymphedema. No surrounding redness or swelling. Did break the skin. Denies any drainage. Allergies   Allergen Reactions    Iv Dye [Iodides] Anaphylaxis     allergic to ct scan dye, not the MRI    Diazepam Other (See Comments)    Insulin Glargine Other (See Comments)     High blood sugar     Trazodone And Nefazodone Other (See Comments)     Makes patient feel very sluggish       Current Outpatient Medications   Medication Sig Dispense Refill    amoxicillin-clavulanate (AUGMENTIN) 875-125 MG per tablet Take 1 tablet by mouth 2 times daily for 7 days 14 tablet 0    montelukast (SINGULAIR) 10 MG tablet Take 1 tablet by mouth nightly 30 tablet 5    blood glucose test strips (ONETOUCH VERIO) strip Test 5 times daily.  200 each 5    insulin aspart (NOVOLOG FLEXPEN) 100 UNIT/ML injection pen INJECT 30 TO 50 UNITS INTO THE SKIN THREE TIMES DAILY BEFORE MEALS 30 mL 0    FEROSUL 325 (65 Fe) MG tablet TAKE 1 TABLET BY MOUTH TWO TIMES A DAY WITH MEALS 180 tablet 0    polyethylene glycol (GLYCOLAX) 17 GM/SCOOP powder TAKE 17 GRAMS (1 CAPFUL) BY MOUTH NIGHTLY 510 g 0    metOLazone (ZAROXOLYN) 5 MG tablet Take 5 mg by mouth Twice a Week      LINZESS 290 MCG CAPS capsule TAKE 1 CAPSULE BY MOUTH ONE TIME A DAY FOR 90 DAYS      REXULTI 1 MG TABS tablet TAKE 1 TABLET BY MOUTH EVERY NIGHT      sulfaSALAzine (AZULFIDINE) 500 MG tablet Take 1 tablet by mouth 2 times daily 60 tablet 1    Febuxostat 80 MG TABS Take 80 mg by mouth daily 30 tablet 1    leflunomide (ARAVA) 10 MG tablet Take 1 tablet by mouth daily 30 tablet 1    colchicine (COLCRYS) 0.6 MG tablet Take 1 tablet by mouth every other day 15 tablet 2    levothyroxine (SYNTHROID) 150 MCG tablet TAKE 1 TABLET BY MOUTH IN THE MORNING (BEFORE BREAKFAST) 90 tablet 1    methocarbamol (ROBAXIN-750) 750 MG tablet Take 1 tablet by mouth 3 times daily as needed (pain and spasm) 30 tablet 0    AMITIZA 24 MCG capsule TAKE 1 CAPSULE BY MOUTH TWO TIMES A DAY WITH MEALS 60 capsule 5    omeprazole (PRILOSEC) 20 MG delayed release capsule TAKE 1 CAPSULE BY MOUTH TWO TIMES A DAY  60 capsule 5    nitroGLYCERIN (NITROSTAT) 0.4 MG SL tablet PLACE ONE TABLET UNDER TONGUE AS NEEDED FOR CHEST PAIN, MAY REPEAT EVERY 5 MINUTES AS NEEDED UP TO A MAX OF 3 TABLETS. IF NO RELIEF AFTER 1 DOSE, CALL 911. 25 tablet 0    Blood Glucose Monitoring Suppl (ONETOUCH VERIO) w/Device KIT Use to check glucose 4 times daily.  1 kit 0    Ertugliflozin L-PyroglutamicAc (STEGLATRO) 5 MG TABS TAKE 1 TABLET BY MOUTH ONCE DAILY 90 tablet 1    spironolactone (ALDACTONE) 50 MG tablet TAKE 2 TABLETS BY MOUTH IN THE MORNING AND THEN TAKE 1 TABLET BY MOUTH IN THE EVENING 270 tablet 3    torsemide (DEMADEX) 100 MG tablet TAKE 1 TABLET BY MOUTH IN THE MORNING AND 1/2 TABLET BY MOUTH IN THE EVENING 135 tablet 3    Insulin Pen Needle (B-D UF III MINI PEN NEEDLES) 31G X 5 MM MISC use five times daily with insulin 200 each 5    vitamin D3 (CHOLECALCIFEROL) 25 MCG (1000 UT) TABS tablet TAKE 3 TABLETS BY MOUTH ONE TIME A DAY 90 tablet 5    Insulin Degludec (TRESIBA FLEXTOUCH) 200 UNIT/ML SOPN inject 200 units subcutaneously once daily (Patient taking differently: 90 Units 2 times daily inject 200 units subcutaneously once daily) 20 pen 3    simvastatin (ZOCOR) 20 MG tablet TAKE 1 TABLET BY MOUTH EVERY DAY AT NIGHT 90 tablet 3    metoprolol tartrate (LOPRESSOR) 25 MG tablet TAKE 1 TABLET BY MOUTH TWO TIMES A DAY  180 tablet 3    ranolazine (RANEXA) 500 MG extended release tablet TAKE 1 TABLET BY MOUTH TWO TIMES A DAY  180 tablet 3    senna-docusate (PERICOLACE) 8.6-50 MG per tablet Take 2 tablets by mouth 2 times daily 360 tablet 3    senna (SENOKOT) 8.6 MG tablet Take 1 tablet by mouth 2 times daily 60 tablet 11    butalbital-acetaminophen-caffeine (FIORICET, ESGIC) -40 MG per tablet Take 1 tablet by mouth every 6 hours as needed for Headaches 30 tablet 1    nystatin (MYCOSTATIN) 988487 UNIT/ML suspension Take 5 mLs by mouth 4 times daily 500 mL 1    Magic Mouthwash (MIRACLE MOUTHWASH) Swish and spit 5 mLs 4 times daily as needed for Irritation or Pain 300 mL 2    traZODone (DESYREL) 50 MG tablet Take 1 tablet by mouth nightly Take it on the day of sleep study 1 tablet 0    loratadine (CLARITIN) 10 MG tablet TAKE 1 TABLET BY MOUTH ONE TIME A DAY  30 tablet 5    lidocaine viscous hcl (XYLOCAINE) 2 % SOLN solution Take 5 mLs by mouth every 3 hours as needed for Irritation or Pain 100 mL 2    ketoconazole (NIZORAL) 2 % shampoo Apply topically daily as needed. 120 mL 1    diazePAM (VALIUM) 5 MG tablet Take 5 mg by mouth 2 times daily as needed for Anxiety.  cariprazine hcl (VRAYLAR) 1.5 MG capsule Take 3 mg by mouth daily       pregabalin (LYRICA) 50 MG capsule Take 50 mg by mouth 2 times daily.  OXYGEN Inhale 2 L into the lungs nightly Sometimes with activity      oxyCODONE (OXYCONTIN) 20 MG extended release tablet Take 20 mg by mouth every 12 hours.  aspirin 81 MG EC tablet Take 81 mg by mouth daily      benztropine (COGENTIN) 1 MG tablet Take 1 mg by mouth nightly       folic acid (FOLVITE) 1 MG tablet Take 1 mg by mouth daily      oxyCODONE-acetaminophen (PERCOCET)  MG per tablet Take 1 tablet by mouth every 4 hours as needed for Pain . Earliest Fill Date: 6/8/17 100 tablet 0    duloxetine (CYMBALTA) 60 MG capsule Take 60 mg by mouth 2 times daily. No current facility-administered medications for this visit.      Review of Systems  Negative other than HPI    Vitals:    10/07/21 1456   BP: 124/78   Pulse: 71 SpO2: 97%   Weight: 197 lb (89.4 kg)      Physical Exam  Constitutional:       General: She is not in acute distress. Appearance: Normal appearance. She is not ill-appearing. HENT:      Head: Normocephalic and atraumatic. Pulmonary:      Effort: Pulmonary effort is normal. No respiratory distress. Skin:     General: Skin is warm and dry. Findings: No erythema. Comments: Left wrist with multiple superficial wounds / bite marks with intact scabbing. No drainage or erythema noted. Pt has chronic lymphedema to LUE. Neurological:      General: No focal deficit present. Mental Status: She is alert and oriented to person, place, and time. Mental status is at baseline. Psychiatric:         Mood and Affect: Mood normal.         Behavior: Behavior normal.       Assessment/Plan:  1. Dog bite of left upper extremity, initial encounter  Uncontrolled   Covering with abx - Take with food. - amoxicillin-clavulanate (AUGMENTIN) 875-125 MG per tablet; Take 1 tablet by mouth 2 times daily for 7 days  Dispense: 14 tablet; Refill: 0  - Tdap (age 6y and older) IM ([address])  Wash with warm water and antibacterial soap   Avoid peroxide use    Reviewed supportive care     Reviewed criteria for follow up     Discussed medications with patient and family, who voiced understanding of their use and indications. All questions answered.     Electronically signed by HERMES Neal CNP on 10/7/2021 at 3:37 PM

## 2021-10-07 NOTE — TELEPHONE ENCOUNTER
Called and spoke with pharmacy and informed them that the patient has refills on her medication's.  Please advise on her Blood pressure's thanks

## 2021-10-07 NOTE — TELEPHONE ENCOUNTER
[facility] states pt is out of spironolactone (ALDACTONE) 50 MG and needs a script to be sent to [address], [address]  Fel[place] Perez [phone]   [address], [address]   Phone:  [phone]  Fax:  [phone]      Also states pt's BP was a little elevated today at 141/63 after taking meds this morning around 11:30 am. Please call to advise.

## 2021-10-08 RX ORDER — SPIRONOLACTONE 50 MG/1
TABLET, FILM COATED ORAL
Qty: 270 TABLET | Refills: 3 | Status: SHIPPED | OUTPATIENT
Start: 2021-10-08 | End: 2022-05-18 | Stop reason: SDUPTHER

## 2021-10-08 NOTE — TELEPHONE ENCOUNTER
Spoke to Júnior. Despite the computer showing refills are present, RN states there are none. Resent script for Spironolactone to [facility].

## 2021-10-12 ENCOUNTER — HOSPITAL ENCOUNTER (OUTPATIENT)
Age: 51
Setting detail: SPECIMEN
Discharge: HOME | End: 2021-10-12
Payer: COMMERCIAL

## 2021-10-12 LAB
ANION GAP SERPL CALCULATED.3IONS-SCNC: 13 MMOL/L (ref 3–16)
BUN BLDV-MCNC: 40 MG/DL (ref 7–20)
CALCIUM SERPL-MCNC: 9.9 MG/DL (ref 8.3–10.6)
CHLORIDE BLD-SCNC: 89 MMOL/L (ref 99–110)
CO2: 33 MMOL/L (ref 21–32)
CREAT SERPL-MCNC: 1.4 MG/DL (ref 0.6–1.1)
GFR AFRICAN AMERICAN: 48
GFR NON-AFRICAN AMERICAN: 39
GLUCOSE BLD-MCNC: 89 MG/DL (ref 70–99)
POTASSIUM SERPL-SCNC: 3.5 MMOL/L (ref 3.5–5.1)
PRO-BNP: 134 PG/ML (ref 0–124)
SODIUM BLD-SCNC: 135 MMOL/L (ref 136–145)

## 2021-10-12 PROCEDURE — 80048 BASIC METABOLIC PNL TOTAL CA: CPT

## 2021-10-12 PROCEDURE — 83880 ASSAY OF NATRIURETIC PEPTIDE: CPT

## 2021-10-12 PROCEDURE — 36415 COLL VENOUS BLD VENIPUNCTURE: CPT

## 2021-10-12 NOTE — PROGRESS NOTES
10/13/2021  Patient Name: Dennis Mcclure  : 1970  Medical Record: [id number]      ASSESSMENT AND PLAN    Assessment/Plan:      ASSESSMENT:    1. Seronegative rheumatoid arthritis ([facility]    2. Idiopathic chronic gout of multiple sites without tophus    3. DDD (degenerative disc disease), lumbar    4. DDD (degenerative disc disease), cervical    5. Positive BRENNA (antinuclear antibody)    6. High risk medication use    7. Primary osteoarthritis involving multiple joints        PLAN:     Crow was seen today for follow-up. Diagnoses and all orders for this visit:    Seronegative rheumatoid arthritis ([facility]    Idiopathic chronic gout of multiple sites without tophus    DDD (degenerative disc disease), lumbar    DDD (degenerative disc disease), cervical    Positive BRENNA (antinuclear antibody)  -     AntiJamal Troy; Future  -     Anti SSA; Future  -     Anti SSB; Future  -     Anti-DNA Antibody, Double-Stranded; Future  -     RIBONUCLEIC PROTEIN ANTIBODY; Future    High risk medication use  -     ALT; Standing  -     AST; Standing  -     CBC Auto Differential; Standing  -     Creatinine, Serum; Standing    Primary osteoarthritis involving multiple joints    Other orders  -     leflunomide (ARAVA) 20 MG tablet; Take 1 tablet by mouth daily    Seronegative rheumatoid arthritis-diagnosed more than 10 years ago. RF, CCP negative. HLA-B27, hepatitis panel negative. Continues to be symptomatic  I did not appreciate any synovitis on joint exam  Hand swelling most likely multifactorial [fluid retention, diabetes, medication side effects]  She will continue sulfasalazine 500 mg twice a day   Increase leflunomide to 20 mg daily  Previous history of methotrexate [hair loss] and Plaquenil [loss of efficacy] use. Advised to obtain x-rays  Gout-no recent flareups of gout.   Uric acid at goal.  Continue Uloric 80 mg daily   Discontinue colchicine    Degenerative disc disease in the lumbar and cervical spine-advised to obtain lumbar and cervical spine x-rays   advised to continue follow-up with a spine specialist/pain specialist.  Follows pain specialist.  Currently on Percocet, oxycodone, Lyrica, Robaxin, diazepam and Cymbalta    Low titer positive BRENNA-1: 80 speckled pattern  Implications of low titer positive BRENNA were discussed with patient. About 15-20% of normal healthy individuals at her age may have low titer positive BRENNA of unclear clinical significance. We will check additional serologies to completely rule out lupus  The patient indicates understanding of these issues and agrees with the plan. Return in about 3 months (around 1/13/2022). The risks and benefits of my recommendations, as well as other treatment options, benefits and side effects werediscussed with the patient. All questions were answered. MEDICATIONS  Current Outpatient Medications   Medication Sig Dispense Refill    leflunomide (ARAVA) 20 MG tablet Take 1 tablet by mouth daily 30 tablet 2    spironolactone (ALDACTONE) 50 MG tablet TAKE 2 TABLETS BY MOUTH IN THE MORNING AND THEN TAKE 1 TABLET BY MOUTH IN THE EVENING 270 tablet 3    amoxicillin-clavulanate (AUGMENTIN) 875-125 MG per tablet Take 1 tablet by mouth 2 times daily for 7 days 14 tablet 0    montelukast (SINGULAIR) 10 MG tablet Take 1 tablet by mouth nightly 30 tablet 5    blood glucose test strips (ONETOUCH VERIO) strip Test 5 times daily.  200 each 5    insulin aspart (NOVOLOG FLEXPEN) 100 UNIT/ML injection pen INJECT 30 TO 50 UNITS INTO THE SKIN THREE TIMES DAILY BEFORE MEALS 30 mL 0    FEROSUL 325 (65 Fe) MG tablet TAKE 1 TABLET BY MOUTH TWO TIMES A DAY WITH MEALS 180 tablet 0    polyethylene glycol (GLYCOLAX) 17 GM/SCOOP powder TAKE 17 GRAMS (1 CAPFUL) BY MOUTH NIGHTLY 510 g 0    metOLazone (ZAROXOLYN) 5 MG tablet Take 5 mg by mouth Twice a Week      LINZESS 290 MCG CAPS capsule TAKE 1 CAPSULE BY MOUTH ONE TIME A DAY FOR 90 DAYS      REXULTI 1 MG TABS tablet TAKE 1 TABLET BY MOUTH EVERY NIGHT      sulfaSALAzine (AZULFIDINE) 500 MG tablet Take 1 tablet by mouth 2 times daily 60 tablet 1    Febuxostat 80 MG TABS Take 80 mg by mouth daily 30 tablet 1    levothyroxine (SYNTHROID) 150 MCG tablet TAKE 1 TABLET BY MOUTH IN THE MORNING (BEFORE BREAKFAST) 90 tablet 1    methocarbamol (ROBAXIN-750) 750 MG tablet Take 1 tablet by mouth 3 times daily as needed (pain and spasm) 30 tablet 0    AMITIZA 24 MCG capsule TAKE 1 CAPSULE BY MOUTH TWO TIMES A DAY WITH MEALS 60 capsule 5    omeprazole (PRILOSEC) 20 MG delayed release capsule TAKE 1 CAPSULE BY MOUTH TWO TIMES A DAY  60 capsule 5    nitroGLYCERIN (NITROSTAT) 0.4 MG SL tablet PLACE ONE TABLET UNDER TONGUE AS NEEDED FOR CHEST PAIN, MAY REPEAT EVERY 5 MINUTES AS NEEDED UP TO A MAX OF 3 TABLETS. IF NO RELIEF AFTER 1 DOSE, CALL 911. 25 tablet 0    Blood Glucose Monitoring Suppl (ONETOUCH VERIO) w/Device KIT Use to check glucose 4 times daily.  1 kit 0    Ertugliflozin L-PyroglutamicAc (STEGLATRO) 5 MG TABS TAKE 1 TABLET BY MOUTH ONCE DAILY 90 tablet 1    torsemide (DEMADEX) 100 MG tablet TAKE 1 TABLET BY MOUTH IN THE MORNING AND 1/2 TABLET BY MOUTH IN THE EVENING 135 tablet 3    Insulin Pen Needle (B-D UF III MINI PEN NEEDLES) 31G X 5 MM MISC use five times daily with insulin 200 each 5    vitamin D3 (CHOLECALCIFEROL) 25 MCG (1000 UT) TABS tablet TAKE 3 TABLETS BY MOUTH ONE TIME A DAY 90 tablet 5    Insulin Degludec (TRESIBA FLEXTOUCH) 200 UNIT/ML SOPN inject 200 units subcutaneously once daily (Patient taking differently: 90 Units 2 times daily inject 200 units subcutaneously once daily) 20 pen 3    simvastatin (ZOCOR) 20 MG tablet TAKE 1 TABLET BY MOUTH EVERY DAY AT NIGHT 90 tablet 3    metoprolol tartrate (LOPRESSOR) 25 MG tablet TAKE 1 TABLET BY MOUTH TWO TIMES A DAY  180 tablet 3    ranolazine (RANEXA) 500 MG extended release tablet TAKE 1 TABLET BY MOUTH TWO TIMES A DAY  180 tablet 3    senna-docusate (PERICOLACE) 8.6-50 MG per tablet Take 2 tablets by mouth 2 times daily 360 tablet 3    senna (SENOKOT) 8.6 MG tablet Take 1 tablet by mouth 2 times daily 60 tablet 11    butalbital-acetaminophen-caffeine (FIORICET, ESGIC) -40 MG per tablet Take 1 tablet by mouth every 6 hours as needed for Headaches 30 tablet 1    nystatin (MYCOSTATIN) 445131 UNIT/ML suspension Take 5 mLs by mouth 4 times daily 500 mL 1    Magic Mouthwash (MIRACLE MOUTHWASH) Swish and spit 5 mLs 4 times daily as needed for Irritation or Pain 300 mL 2    traZODone (DESYREL) 50 MG tablet Take 1 tablet by mouth nightly Take it on the day of sleep study 1 tablet 0    loratadine (CLARITIN) 10 MG tablet TAKE 1 TABLET BY MOUTH ONE TIME A DAY  30 tablet 5    lidocaine viscous hcl (XYLOCAINE) 2 % SOLN solution Take 5 mLs by mouth every 3 hours as needed for Irritation or Pain 100 mL 2    ketoconazole (NIZORAL) 2 % shampoo Apply topically daily as needed. 120 mL 1    diazePAM (VALIUM) 5 MG tablet Take 5 mg by mouth 2 times daily as needed for Anxiety.  cariprazine hcl (VRAYLAR) 1.5 MG capsule Take 3 mg by mouth daily       pregabalin (LYRICA) 50 MG capsule Take 50 mg by mouth 2 times daily.  OXYGEN Inhale 2 L into the lungs nightly Sometimes with activity      oxyCODONE (OXYCONTIN) 20 MG extended release tablet Take 20 mg by mouth every 12 hours.  aspirin 81 MG EC tablet Take 81 mg by mouth daily      benztropine (COGENTIN) 1 MG tablet Take 1 mg by mouth nightly       folic acid (FOLVITE) 1 MG tablet Take 1 mg by mouth daily      oxyCODONE-acetaminophen (PERCOCET)  MG per tablet Take 1 tablet by mouth every 4 hours as needed for Pain . Earliest Fill Date: 6/8/17 100 tablet 0    duloxetine (CYMBALTA) 60 MG capsule Take 60 mg by mouth 2 times daily. No current facility-administered medications for this visit.        ALLERGIES  Allergies   Allergen Reactions    Iv Dye [Iodides] Anaphylaxis     allergic to ct scan dye, not the MRI    Diazepam Other (See Comments)    Insulin Glargine Other (See Comments)     High blood sugar     Trazodone And Nefazodone Other (See Comments)     Makes patient feel very sluggish         Comments  No specialty comments available. Background history:  Army Blevins is a 45 y.o. female with past medical history of hypertension, diabetes, congestive heart failure, COPD, depression, hypothyroidism, chronic kidney disease, degenerative disc disease in the lumbar spine, breast cancer status post chemoradiation, seronegative rheumatoid arthritis, gout who is being seen for follow up evaluation of  seronegative rheumatoid arthritis and gout. She was seeing Dr. Shayy Leyva  at [facility].  She was last seen in January 2021. She was diagnosed with seronegative rheumatoid arthritis several years ago. She was on methotrexate and Plaquenil in the past which was stopped due to lack of efficacy. She is now on leflunomide 10 mg daily and sulfasalazine 500 mg twice a day. She was on sulfasalazine 1000 mg twice a day which was slowly tapered down to 500 mg twice a day since it was not helping much. She continues to have pain in the joints especially with weather changes. She has swelling in the hands. She has stiffness in the joints that can last all day long. She denies psoriasis, inflammatory bowel disease, inflammatory back pain, dactylitis, enthesitis, tenosynovitis or uveitis. She denies fever, weight loss or swollen glands. She denies family history of rheumatoid arthritis. She has degenerative disc disease in the lumbar and cervical spine. She has low chronic low back pain. Back pain gets worse with activity and improves with rest.  Back pain radiates to the left lower extremity. She denies bowel or bladder dysfunction, saddle anesthesia. She sees a pain specialist and receives epidural spinal injections. She also has a gout which was diagnosed 2 years ago.   She is on Uloric 80 mg daily and colchicine 0.6 mg daily. She is unable to go off of colchicine due to frequent flareups. She has lymphedema of the left arm after she underwent breast surgery with lymph node resection and chemoradiation. She has been on tamoxifen for several years. She is off of tamoxifen for past 2 years. She has not had any recurrence of breast cancer. Data reviewed: Reviewed notes by Dr. Elif Bermeo from January 2021 as mentioned in HPI. Chronic rheumatoid arthritis, gout, lymphedema, CKD. Taper of sulfasalazine and continue Arava 10 mg daily. Continue Uloric 80 mg daily. Interim history: She presents for follow-up of seronegative rheumatoid arthritis, osteoarthritis, gout, chronic pain. She is on leflunomide 10 mg daily and sulfasalazine 500 mg twice a day. She continues complain of pain, swelling and stiffness in the joints. Symptoms get worse with weather changes. She has swelling in the hands. Stiffness can last all day long. She also has chronic neck and low back pain. She sees a pain specialist.  She has degenerative disc disease in the lumbar and cervical spine. She has received multiple epidural spinal injections in the past.  She also has gout. She has not had any new flareup of gout since last seen. She is on Uloric 80 mg daily and colchicine 0.6 mg every other day. Uric acid is at goal.  She denies any side effects with leflunomide and sulfasalazine. She denies any recent fevers or infections. X-rays were ordered but have not been obtained yet. HPI  Review of Systems    REVIEW OF SYSTEMS: Positive for shortness of breath, wheezing, renal disease,, anxiety, depression, neuropathies, paresthesias  Constitutional: No unanticipated weight loss or fevers.    Integumentary: No rash, photosensitivity, malar rash, livedo reticularis, alopecia and Raynaud's symptoms, sclerodactyly, skin tightening  Eyes: negative for visual disturbance and persistent redness, discharge from eyes   ENT: - No tinnitus, loss of hearing, vertigo, or recurrent ear infections.  - No history of nasal/oral ulcers. - No history of dry eyes/dry mouth  Cardiovascular: No history of pericarditis, chest pain or murmur or palpitations  Respiratory: No cough or history of interstitial lung disease. No history of pleurisy. No history of tuberculosis or atypical infections. Gastrointestinal: No history of heart burn, dysphagia or esophageal dysmotility. No change in bowel habits or any inflammatory bowel disease. Genitourinary: No history of miscarriages. Hematologic/Lymphatic: No abnormal bruising or bleeding, blood clots or swollen lymph nodes. Neurological: No history of headaches, seizure or focal weakness. No history of  hyperesthesias, facial droop, diplopia  Psychiatric: No history of bipolar disease  Endocrine: Denies any polyuria, polydipsia and osteoporosis  Allergic/Immunologic: No nasal congestion or hives. I have reviewed patients Past medical History, Social History and Family History as mentioned in her chart and this remains unchanged fromprevious.     Past Medical History:   Diagnosis Date    Anxiety     Anxiety and depression     Arthritis     not sure of specific type    Asthma     CAD (coronary artery disease)     Cancer ([address] 2007    left breast    Cerebral artery occlusion with cerebral infarction ([address]     2000, 2001    CHF (congestive heart failure) ([address] 2018    Chronic kidney disease     renal insufficency/to see Dr Roberta Walker    Chronic pain     Constipation     COPD (chronic obstructive pulmonary disease) ([address]     Depression     Diabetes mellitus ([address]     Diabetic polyneuropathy associated with type 2 diabetes mellitus ([address] 2/2/2018    Dysthymia 2/2/2018    ESBL (extended spectrum beta-lactamase) producing bacteria infection 03/14/2018    urine    Fibromyalgia     Gastric ulcer, unspecified as acute or chronic, without mention of hemorrhage, perforation, or obstruction  GERD (gastroesophageal reflux disease)     Gout     HIGH CHOLESTEROL     Hypertension     Hypothyroidism     Severe persistent asthma without complication 9/9/3938    Thyroid disease     TIA (transient ischemic attack)     with occasional left leg and hand weakness     Past Surgical History:   Procedure Laterality Date    BREAST SURGERY      left mastectomy    CARPAL TUNNEL RELEASE      Bilateral    FINGER CONTRACTURE SURGERY      HYSTERECTOMY  2005    USO    TONSILLECTOMY      UPPER GASTROINTESTINAL ENDOSCOPY  2019    stretched esophagous      Social History     Socioeconomic History    Marital status:      Spouse name: Eloise Mendez Number of children: 3    Years of education: Not on file    Highest education level: Not on file   Occupational History    Occupation: disabled   Tobacco Use    Smoking status: Never Smoker    Smokeless tobacco: Never Used   Vaping Use    Vaping Use: Never used   Substance and Sexual Activity    Alcohol use: No    Drug use: No    Sexual activity: Not Currently   Other Topics Concern    Not on file   Social History Narrative    Not on file     Social Determinants of Health     Financial Resource Strain: Low Risk     Difficulty of Paying Living Expenses: Not hard at all   Food Insecurity: No Food Insecurity    Worried About Running Out of Food in the Last Year: Never true    Linda of Food in the Last Year: Never true   Transportation Needs:     Lack of Transportation (Medical):      Lack of Transportation (Non-Medical):    Physical Activity:     Days of Exercise per Week:     Minutes of Exercise per Session:    Stress:     Feeling of Stress :    Social Connections:     Frequency of Communication with Friends and Family:     Frequency of Social Gatherings with Friends and Family:     Attends [employer] Services:     Active Member of Clubs or Organizations:     Attends Club or Organization Meetings:     Marital Status:    Intimate Partner Violence:     Fear of Current or Ex-Partner:     Emotionally Abused:     Physically Abused:     Sexually Abused:      Family History   Problem Relation Age of Onset    Asthma Other     Cancer Other     Depression Other     Diabetes Other     Hypertension Other     High Cholesterol Other     Migraines Other     Heart Attack Father 72         of MI    High Blood Pressure Mother     Diabetes Mother          PHYSICAL EXAM   Vitals:    10/13/21 1028   BP: 108/62   Pulse: 76   Weight: 194 lb 3.2 oz (88.1 kg)     Physical Exam  Constitutional:  Well developed, well nourished, no acute distress, non-toxic appearance   Musculoskeletal:    Ambulates without assistance, normal gait  Neck: Decreased range of motion, tenderness to palpation +  RIGHT  Swell  Tender  ROM  LEFT  Swell  Tender  ROM    DIP2  0  0   [place]  0  0   [place]   DIP3  0  0   [place]  0  0   [place]   DIP4  0  0   [place]  0  0   [place]   DIP5  0  0   [place]  0  0   [place]   PIP1  0  0  FULL   0  0  FULL    PIP2  0 0 FULL   0  0  FULL    PIP3  0 0 FULL   0  0  FULL    PIP4  0 0 FULL   0  0  FULL    PIP5  0 0 FULL   0  0  FULL    MCP1  0 0 FULL   0  0  FULL    MCP2  0 0 FULL   0  0  FULL    MCP3  0 0 FULL   0  0  FULL    MCP4  0 0 FULL   0  0  FULL    MCP5  0 0 FULL   0  0  FULL    Wrist  0  0  FULL   0  0  FULL    Elbow  0  0  FULL   0  0  FULL    Shouldr  0  0  decr  0  0  decr   Hip  0  0  decr  0  0  decr   Knee  0  0   crepitus  0  0   crepitus   Ankle  0  0  FULL   0  0  FULL    MTP1  0  0  FULL   0  0  FULL    MTP2  0  0  FULL   0  0  FULL    MTP3  0  0  FULL   0  0  FULL    MTP4  0  0  FULL   0  0  FULL    MTP5  0  0  FULL   0  0  FULL    IP1  0  0  FULL   0  0  FULL    IP2  0  0  FULL   0  0  FULL    IP3  0  0  FULL   0  0  FULL    IP4  0  0  FULL   0  0  FULL    IP5  0  0  FULL   0 0 FULL     Lymphedema of the left hand                                            Right            Left Tender Tender    Costochondral  + +   Low cervical + +   suboccipital + +   Trapezius  + +   Supraspinatus  + +   Lateral epicondyl + +   Gluteal + +   Greater trochanter  + +   knees + +     Back-tenderness to palpation bilateral lumbar spine and paraspinal area, bilateral SI joint tenderness  Eyes:  PERRL, extra ocular movements intact, conjunctiva normal   HEENT:  Atraumatic, normocephalic, external ears normal, oropharynx moist, no pharyngeal exudates. Respiratory:  No respiratory distress. Bilateral wheezing  GI:  Soft, nondistended, normal bowel sounds, nontender, noorganomegaly, no mass, no rebound, no guarding   :  No costovertebral angle tenderness   Integument:  Well hydrated, no rash or telangiectasias  Lymphatic:  No lymphadenopathy noted   Neurologic:   Alert & oriented x 3, CN 2-12 normal, no focal deficits noted. Sensations Intact. Muscle strength 5/5 proximally and distally in upper and lower extremities.    Psychiatric:  Speech and behavior appropriate   Extremities: 3+ pedal edema in the right lower extremity up to the knee and 2+ pedal edema in the left lower extremity        LABS AND IMAGING  Outside data reviewed and in HPI    Lab Results   Component Value Date    WBC 7.6 08/17/2021    RBC 4.15 08/17/2021    RBC 3.57 05/16/2017    HGB 12.0 08/17/2021    HCT 37.0 08/17/2021     08/17/2021    MCV 89.2 08/17/2021    MCH 29.0 08/17/2021    MCHC 32.4 08/17/2021    RDW 13.5 08/17/2021    SEGSPCT 63.9 07/08/2010    BANDSPCT 1 01/11/2018    LYMPHOPCT 23.5 08/17/2021    LYMPHOPCT 26.2 05/16/2017    MONOPCT 4.2 08/17/2021    EOSPCT 0.8 07/08/2010    BASOPCT 0.5 08/17/2021    MONOSABS 0.3 08/17/2021    LYMPHSABS 1.8 08/17/2021    EOSABS 0.1 08/17/2021    BASOSABS 0.0 08/17/2021    DIFFTYPE Auto-K 07/08/2010       Chemistry        Component Value Date/Time     (L) 10/12/2021 1300    K 3.5 10/12/2021 1300    K 4.4 10/29/2020 0429    CL 89 (L) 10/12/2021 1300    CO2 33 (H) 10/12/2021 1300 BUN 40 (H) 10/12/2021 1300    CREATININE 1.4 (H) 10/12/2021 1300        Component Value Date/Time    CALCIUM 9.9 10/12/2021 1300    ALKPHOS 137 (H) 08/17/2021 1310    AST 13 (L) 08/17/2021 1310    ALT 13 08/17/2021 1310    BILITOT <0.2 08/17/2021 1310          Lab Results   Component Value Date    SEDRATE 93 (H) 08/17/2021     Lab Results   Component Value Date    CRP 8.0 (H) 08/17/2021     Lab Results   Component Value Date    BRENNA Negative 07/08/2010     Lab Results   Component Value Date    RF <10.0 08/10/2021     Lab Results   Component Value Date    BRENNA Negative 07/08/2010    ANATITER 1:80 08/10/2021     No results found for: DSDNAG, DSDNAIGGIFA  No results found for: SSAROAB, SSALAAB  No results found for: SMAB, RNPAB  No results found for: CENTABIGG  No results found for: C3, C4, ACE  Lab Results   Component Value Date    VITD25 46.6 05/30/2019     Lab Results   Component Value Date    LABURIC 5.3 08/17/2021     Lab Results   Component Value Date    FSZIYCCN94 396 01/29/2020     Lab Results   Component Value Date    TSHFT4 0.45 08/10/2021    TSH 0.91 10/29/2020     Lab Results   Component Value Date    VITD25 46.6 05/30/2019   ######################################################################    I thank you for giving me theopportunity to participate in [address] [place] [address]. If you have any questions or concerns please feel free to contact me. I look forward to following  Crow along with you. Electronically signed by: Gabe Simpson MD, MD, 10/13/2021 11:08 AM    Documentation was done using voice recognition dragon software. Every effort was made to ensure accuracy;however, inadvertent unintentional computerized transcription errors may be present.

## 2021-10-13 ENCOUNTER — OFFICE VISIT (OUTPATIENT)
Dept: RHEUMATOLOGY | Age: 51
End: 2021-10-13
Payer: COMMERCIAL

## 2021-10-13 VITALS
HEART RATE: 76 BPM | BODY MASS INDEX: 37.93 KG/M2 | WEIGHT: 194.2 LBS | SYSTOLIC BLOOD PRESSURE: 108 MMHG | DIASTOLIC BLOOD PRESSURE: 62 MMHG

## 2021-10-13 DIAGNOSIS — M1A.09X0 IDIOPATHIC CHRONIC GOUT OF MULTIPLE SITES WITHOUT TOPHUS: ICD-10-CM

## 2021-10-13 DIAGNOSIS — Z79.899 HIGH RISK MEDICATION USE: ICD-10-CM

## 2021-10-13 DIAGNOSIS — R76.8 POSITIVE ANA (ANTINUCLEAR ANTIBODY): ICD-10-CM

## 2021-10-13 DIAGNOSIS — M06.00 SERONEGATIVE RHEUMATOID ARTHRITIS (HCC): Primary | ICD-10-CM

## 2021-10-13 DIAGNOSIS — M50.30 DDD (DEGENERATIVE DISC DISEASE), CERVICAL: ICD-10-CM

## 2021-10-13 DIAGNOSIS — M51.36 DDD (DEGENERATIVE DISC DISEASE), LUMBAR: ICD-10-CM

## 2021-10-13 DIAGNOSIS — M15.9 PRIMARY OSTEOARTHRITIS INVOLVING MULTIPLE JOINTS: ICD-10-CM

## 2021-10-13 PROCEDURE — 3017F COLORECTAL CA SCREEN DOC REV: CPT | Performed by: INTERNAL MEDICINE

## 2021-10-13 PROCEDURE — 99214 OFFICE O/P EST MOD 30 MIN: CPT | Performed by: INTERNAL MEDICINE

## 2021-10-13 PROCEDURE — G8427 DOCREV CUR MEDS BY ELIG CLIN: HCPCS | Performed by: INTERNAL MEDICINE

## 2021-10-13 PROCEDURE — G8417 CALC BMI ABV UP PARAM F/U: HCPCS | Performed by: INTERNAL MEDICINE

## 2021-10-13 PROCEDURE — 1036F TOBACCO NON-USER: CPT | Performed by: INTERNAL MEDICINE

## 2021-10-13 PROCEDURE — G8482 FLU IMMUNIZE ORDER/ADMIN: HCPCS | Performed by: INTERNAL MEDICINE

## 2021-10-13 RX ORDER — LISINOPRIL 10 MG/1
TABLET ORAL
COMMUNITY
End: 2021-10-13

## 2021-10-13 RX ORDER — LEFLUNOMIDE 20 MG/1
20 TABLET ORAL DAILY
Qty: 30 TABLET | Refills: 2 | Status: SHIPPED | OUTPATIENT
Start: 2021-10-13 | End: 2022-02-22 | Stop reason: SDUPTHER

## 2021-10-13 NOTE — PATIENT INSTRUCTIONS
Increase arava to 20 mg daily   Discontinue colchicine   Labs   xrays   Continue sulfasalazine and uloric

## 2021-10-25 ENCOUNTER — HOSPITAL ENCOUNTER (OUTPATIENT)
Age: 51
Setting detail: SPECIMEN
Discharge: HOME | End: 2021-10-25
Payer: COMMERCIAL

## 2021-10-25 LAB
ANION GAP SERPL CALCULATED.3IONS-SCNC: 13 MMOL/L (ref 3–16)
BUN BLDV-MCNC: 37 MG/DL (ref 7–20)
CALCIUM SERPL-MCNC: 9.9 MG/DL (ref 8.3–10.6)
CHLORIDE BLD-SCNC: 98 MMOL/L (ref 99–110)
CO2: 31 MMOL/L (ref 21–32)
CREAT SERPL-MCNC: 1.6 MG/DL (ref 0.6–1.1)
GFR AFRICAN AMERICAN: 41
GFR NON-AFRICAN AMERICAN: 34
GLUCOSE BLD-MCNC: 75 MG/DL (ref 70–99)
POTASSIUM SERPL-SCNC: 4.2 MMOL/L (ref 3.5–5.1)
PRO-BNP: 176 PG/ML (ref 0–124)
SODIUM BLD-SCNC: 142 MMOL/L (ref 136–145)

## 2021-10-25 PROCEDURE — 83880 ASSAY OF NATRIURETIC PEPTIDE: CPT

## 2021-10-25 PROCEDURE — 36415 COLL VENOUS BLD VENIPUNCTURE: CPT

## 2021-10-25 PROCEDURE — 80048 BASIC METABOLIC PNL TOTAL CA: CPT

## 2021-10-27 ENCOUNTER — TELEPHONE (OUTPATIENT)
Dept: INTERNAL MEDICINE CLINIC | Age: 51
End: 2021-10-27

## 2021-10-27 NOTE — TELEPHONE ENCOUNTER
[address] [facility] is called for order that were faxed over on 10/25/2021, 10/20/2021 and 10/15/2021    Please find, sign, date and send them back to : [phone]

## 2021-10-27 NOTE — TELEPHONE ENCOUNTER
Spoke with Mehrdad Noland from [employer] and let her know as soon as they are signed I will get them faxed over.

## 2021-11-02 ENCOUNTER — HOSPITAL ENCOUNTER (OUTPATIENT)
Dept: GENERAL RADIOLOGY | Age: 51
Discharge: HOME | End: 2021-11-02
Payer: COMMERCIAL

## 2021-11-02 ENCOUNTER — HOSPITAL ENCOUNTER (OUTPATIENT)
Age: 51
Discharge: HOME | End: 2021-11-02
Payer: COMMERCIAL

## 2021-11-02 ENCOUNTER — OFFICE VISIT (OUTPATIENT)
Dept: ENDOCRINOLOGY | Age: 51
End: 2021-11-02
Payer: COMMERCIAL

## 2021-11-02 VITALS
HEART RATE: 81 BPM | BODY MASS INDEX: 37.34 KG/M2 | DIASTOLIC BLOOD PRESSURE: 76 MMHG | WEIGHT: 191.2 LBS | SYSTOLIC BLOOD PRESSURE: 121 MMHG

## 2021-11-02 DIAGNOSIS — M51.36 DDD (DEGENERATIVE DISC DISEASE), LUMBAR: ICD-10-CM

## 2021-11-02 DIAGNOSIS — M06.00 SERONEGATIVE RHEUMATOID ARTHRITIS (HCC): ICD-10-CM

## 2021-11-02 DIAGNOSIS — M50.30 DDD (DEGENERATIVE DISC DISEASE), CERVICAL: ICD-10-CM

## 2021-11-02 PROCEDURE — 3017F COLORECTAL CA SCREEN DOC REV: CPT | Performed by: INTERNAL MEDICINE

## 2021-11-02 PROCEDURE — 1036F TOBACCO NON-USER: CPT | Performed by: INTERNAL MEDICINE

## 2021-11-02 PROCEDURE — 72100 X-RAY EXAM L-S SPINE 2/3 VWS: CPT

## 2021-11-02 PROCEDURE — 2022F DILAT RTA XM EVC RTNOPTHY: CPT | Performed by: INTERNAL MEDICINE

## 2021-11-02 PROCEDURE — G8417 CALC BMI ABV UP PARAM F/U: HCPCS | Performed by: INTERNAL MEDICINE

## 2021-11-02 PROCEDURE — G8482 FLU IMMUNIZE ORDER/ADMIN: HCPCS | Performed by: INTERNAL MEDICINE

## 2021-11-02 PROCEDURE — 73130 X-RAY EXAM OF HAND: CPT

## 2021-11-02 PROCEDURE — 3052F HG A1C>EQUAL 8.0%<EQUAL 9.0%: CPT | Performed by: INTERNAL MEDICINE

## 2021-11-02 PROCEDURE — G8427 DOCREV CUR MEDS BY ELIG CLIN: HCPCS | Performed by: INTERNAL MEDICINE

## 2021-11-02 PROCEDURE — 72040 X-RAY EXAM NECK SPINE 2-3 VW: CPT

## 2021-11-02 PROCEDURE — 99215 OFFICE O/P EST HI 40 MIN: CPT | Performed by: INTERNAL MEDICINE

## 2021-11-02 PROCEDURE — 72200 X-RAY EXAM SI JOINTS: CPT

## 2021-11-02 RX ORDER — INSULIN ASPART 100 [IU]/ML
INJECTION, SOLUTION INTRAVENOUS; SUBCUTANEOUS
Qty: 20 PEN | Refills: 3 | Status: SHIPPED | OUTPATIENT
Start: 2021-11-02 | End: 2022-04-14
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
## 2021-11-08 ENCOUNTER — HOSPITAL ENCOUNTER (OUTPATIENT)
Age: 51
Setting detail: SPECIMEN
Discharge: HOME | End: 2021-11-08
Payer: COMMERCIAL

## 2021-11-08 ENCOUNTER — TELEPHONE (OUTPATIENT)
Dept: CARDIOLOGY CLINIC | Age: 51
End: 2021-11-08

## 2021-11-08 LAB
ANION GAP SERPL CALCULATED.3IONS-SCNC: 11 MMOL/L (ref 3–16)
BUN BLDV-MCNC: 37 MG/DL (ref 7–20)
CALCIUM SERPL-MCNC: 9.1 MG/DL (ref 8.3–10.6)
CHLORIDE BLD-SCNC: 94 MMOL/L (ref 99–110)
CO2: 34 MMOL/L (ref 21–32)
CREAT SERPL-MCNC: 1.2 MG/DL (ref 0.6–1.1)
GFR AFRICAN AMERICAN: 57
GFR NON-AFRICAN AMERICAN: 47
GLUCOSE BLD-MCNC: 83 MG/DL (ref 70–99)
POTASSIUM SERPL-SCNC: 4.6 MMOL/L (ref 3.5–5.1)
PRO-BNP: 387 PG/ML (ref 0–124)
SODIUM BLD-SCNC: 139 MMOL/L (ref 136–145)

## 2021-11-08 PROCEDURE — 83880 ASSAY OF NATRIURETIC PEPTIDE: CPT

## 2021-11-08 PROCEDURE — 36415 COLL VENOUS BLD VENIPUNCTURE: CPT

## 2021-11-08 PROCEDURE — 80048 BASIC METABOLIC PNL TOTAL CA: CPT

## 2021-11-08 NOTE — TELEPHONE ENCOUNTER
I spoke pt . Increased swelling, SOB. Denies CP, radiating or jaw pain. Pt had labs today through home health. Sharon Norwood is  [facility] , [facility]      I spoke with Nils Merida at [employer]. She stated that her samples were brought to her at [facility]. May have results later this afternoon.

## 2021-11-08 NOTE — TELEPHONE ENCOUNTER
Pt  calling for ARETHA Pt weight is up over a 3 day period. She went from 189 lbs to 200 lbs and now today is at 198 lbs. Pt is SOB as well. Needs to know what to do?  Pls call to advise Thank you

## 2021-11-09 NOTE — TELEPHONE ENCOUNTER
She is taking:   Metolazone 5mg twice a week. Sunday and Wednesday  Spironolactone  100mg in the AM and 50mg in the PM  Torsemide 100mg in the AM and 50mg in the PM      Wt today 194 down from 200. Today she feels better. Pt took 3 tablets of Metolazone 5mg last week (Oct 30, 31st and Nov 3rd)     Her  feels that 1 1/2 tablets of Metolazone (7.5mg) twice a week on Sunday and Wednesday would work best.  He wants to know your feelings.

## 2021-11-11 NOTE — TELEPHONE ENCOUNTER
Spoke to pt's  and he was made aware of medication increase per ARETHA. He will call if he has issues. [address] and updated. Spoke to Leda Galindo.

## 2021-11-16 ENCOUNTER — TELEPHONE (OUTPATIENT)
Dept: INTERNAL MEDICINE CLINIC | Age: 51
End: 2021-11-16

## 2021-11-18 RX ORDER — BUTALBITAL, ACETAMINOPHEN AND CAFFEINE 50; 325; 40 MG/1; MG/1; MG/1
TABLET ORAL
Qty: 30 TABLET | Refills: 0 | Status: SHIPPED | OUTPATIENT
Start: 2021-11-18 | End: 2021-11-22

## 2021-11-19 RX ORDER — COLCHICINE 0.6 MG/1
0.6 TABLET ORAL EVERY OTHER DAY
Qty: 15 TABLET | Refills: 0 | OUTPATIENT
Start: 2021-11-19

## 2021-11-22 ENCOUNTER — HOSPITAL ENCOUNTER (OUTPATIENT)
Age: 51
Setting detail: SPECIMEN
Discharge: HOME | End: 2021-11-22
Payer: COMMERCIAL

## 2021-11-22 LAB
ANION GAP SERPL CALCULATED.3IONS-SCNC: 11 MMOL/L (ref 3–16)
BUN BLDV-MCNC: 57 MG/DL (ref 7–20)
CALCIUM SERPL-MCNC: 9.4 MG/DL (ref 8.3–10.6)
CHLORIDE BLD-SCNC: 98 MMOL/L (ref 99–110)
CO2: 30 MMOL/L (ref 21–32)
CREAT SERPL-MCNC: 1.4 MG/DL (ref 0.6–1.1)
GFR AFRICAN AMERICAN: 48
GFR NON-AFRICAN AMERICAN: 39
GLUCOSE BLD-MCNC: 316 MG/DL (ref 70–99)
POTASSIUM SERPL-SCNC: 5.2 MMOL/L (ref 3.5–5.1)
PRO-BNP: 201 PG/ML (ref 0–124)
SODIUM BLD-SCNC: 139 MMOL/L (ref 136–145)

## 2021-11-22 PROCEDURE — 83880 ASSAY OF NATRIURETIC PEPTIDE: CPT

## 2021-11-22 PROCEDURE — 36415 COLL VENOUS BLD VENIPUNCTURE: CPT

## 2021-11-22 PROCEDURE — 80048 BASIC METABOLIC PNL TOTAL CA: CPT

## 2021-11-23 ENCOUNTER — TELEPHONE (OUTPATIENT)
Dept: CARDIOLOGY CLINIC | Age: 51
End: 2021-11-23

## 2021-11-23 DIAGNOSIS — I50.32 CHRONIC HEART FAILURE WITH PRESERVED EJECTION FRACTION (HCC): Primary | ICD-10-CM

## 2021-11-23 NOTE — TELEPHONE ENCOUNTER
Talked with  per alma rosa and relayed message below per NPRG, states wife takes aldactone 100 mg am and 50 mg pm and she is not on any potassium supplement or eating a lot of it. Please advise.

## 2021-11-23 NOTE — TELEPHONE ENCOUNTER
[facility] and relayed message per [facility] with verbal understanding form .

## 2021-11-24 ENCOUNTER — TELEPHONE (OUTPATIENT)
Dept: INTERNAL MEDICINE CLINIC | Age: 51
End: 2021-11-24

## 2021-11-24 ENCOUNTER — TELEPHONE (OUTPATIENT)
Dept: CARDIOLOGY CLINIC | Age: 51
End: 2021-11-24

## 2021-11-24 NOTE — TELEPHONE ENCOUNTER
She should not be having severe headaches from high potassium. Can you please get more info regarding her headaches? I can do a VV with her later today if needed.

## 2021-11-24 NOTE — TELEPHONE ENCOUNTER
calling to see if due to her high Potassium and meds will be changed ? He is also asking that an order for labs be sent to patients N to be drawn on Monday to re-check her potassium .

## 2021-11-24 NOTE — TELEPHONE ENCOUNTER
Pt  calling said pt had labs drawn through Dr Scott Alvarez and her potassium is high--having severe headaches from it---can you give her something for the potassium---please call the  at [phone]. Thanks.

## 2021-11-24 NOTE — TELEPHONE ENCOUNTER
Patient's  notified (ok per hippa) lab orders have been faxed to [facility] to have lab drawn Monday. Verbalized understanding.

## 2021-11-24 NOTE — TELEPHONE ENCOUNTER
According to calls to cardiology this has already been addressed. Pt was advised to recheck labs in 2 weeks.

## 2021-11-26 ENCOUNTER — TELEPHONE (OUTPATIENT)
Dept: ADMINISTRATIVE | Age: 51
End: 2021-11-26

## 2021-11-29 LAB — POTASSIUM SERPL-SCNC: 4.8 MEQ/L (ref 3.6–5.1)

## 2021-12-06 ENCOUNTER — TELEPHONE (OUTPATIENT)
Dept: CARDIOLOGY CLINIC | Age: 51
End: 2021-12-06

## 2021-12-06 DIAGNOSIS — I50.32 CHRONIC HEART FAILURE WITH PRESERVED EJECTION FRACTION (HCC): Primary | ICD-10-CM

## 2021-12-06 DIAGNOSIS — I89.0 LYMPHEDEMA OF UPPER EXTREMITY FOLLOWING LYMPHADENECTOMY: ICD-10-CM

## 2021-12-06 DIAGNOSIS — R06.02 SOB (SHORTNESS OF BREATH): ICD-10-CM

## 2021-12-06 DIAGNOSIS — N28.9 RENAL INSUFFICIENCY: ICD-10-CM

## 2021-12-06 DIAGNOSIS — E89.89 LYMPHEDEMA OF UPPER EXTREMITY FOLLOWING LYMPHADENECTOMY: ICD-10-CM

## 2021-12-06 DIAGNOSIS — I10 ESSENTIAL HYPERTENSION: ICD-10-CM

## 2021-12-13 ENCOUNTER — HOSPITAL ENCOUNTER (OUTPATIENT)
Age: 51
Setting detail: SPECIMEN
Discharge: HOME | End: 2021-12-13
Payer: COMMERCIAL

## 2021-12-13 LAB
ANION GAP SERPL CALCULATED.3IONS-SCNC: 14 MMOL/L (ref 3–16)
BUN BLDV-MCNC: 54 MG/DL (ref 7–20)
CALCIUM SERPL-MCNC: 9 MG/DL (ref 8.3–10.6)
CHLORIDE BLD-SCNC: 98 MMOL/L (ref 99–110)
CO2: 29 MMOL/L (ref 21–32)
CREAT SERPL-MCNC: 1.3 MG/DL (ref 0.6–1.1)
GFR AFRICAN AMERICAN: 52
GFR NON-AFRICAN AMERICAN: 43
GLUCOSE BLD-MCNC: 69 MG/DL (ref 70–99)
POTASSIUM SERPL-SCNC: 4.2 MMOL/L (ref 3.5–5.1)
PRO-BNP: 138 PG/ML (ref 0–124)
SODIUM BLD-SCNC: 141 MMOL/L (ref 136–145)

## 2021-12-13 PROCEDURE — 80048 BASIC METABOLIC PNL TOTAL CA: CPT

## 2021-12-13 PROCEDURE — 36415 COLL VENOUS BLD VENIPUNCTURE: CPT

## 2021-12-13 PROCEDURE — 83880 ASSAY OF NATRIURETIC PEPTIDE: CPT

## 2021-12-13 NOTE — TELEPHONE ENCOUNTER
[facility] called back to make sure that labs are to be done every 2 week. They verbalized understanding of the message below.

## 2021-12-14 ENCOUNTER — TELEPHONE (OUTPATIENT)
Dept: CARDIOLOGY CLINIC | Age: 51
End: 2021-12-14

## 2021-12-15 DIAGNOSIS — D50.9 IRON DEFICIENCY ANEMIA, UNSPECIFIED IRON DEFICIENCY ANEMIA TYPE: ICD-10-CM

## 2021-12-15 RX ORDER — FERROUS SULFATE 325(65) MG
TABLET ORAL
Qty: 180 TABLET | Refills: 0 | Status: SHIPPED | OUTPATIENT
Start: 2021-12-15 | End: 2022-03-11

## 2021-12-15 RX ORDER — ERTUGLIFLOZIN 5 MG/1
TABLET, FILM COATED ORAL
Qty: 90 TABLET | Refills: 1 | Status: SHIPPED | OUTPATIENT
Start: 2021-12-15 | End: 2022-06-13

## 2021-12-15 RX ORDER — SULFASALAZINE 500 MG/1
TABLET ORAL
Qty: 60 TABLET | Refills: 0 | Status: SHIPPED | OUTPATIENT
Start: 2021-12-15 | End: 2022-02-24 | Stop reason: SDUPTHER

## 2021-12-15 RX ORDER — COLCHICINE 0.6 MG/1
0.6 TABLET ORAL EVERY OTHER DAY
Qty: 15 TABLET | Refills: 0 | OUTPATIENT
Start: 2021-12-15

## 2021-12-15 RX ORDER — LUBIPROSTONE 24 UG/1
CAPSULE, GELATIN COATED ORAL
Qty: 60 CAPSULE | Refills: 0 | Status: SHIPPED | OUTPATIENT
Start: 2021-12-15 | End: 2022-01-06

## 2021-12-16 NOTE — TELEPHONE ENCOUNTER
Submitted PA for Steglatro 5MG tablets Key: [id number] - PA Case ID: [id number] Via CMM STATUS: PENDING

## 2021-12-23 ENCOUNTER — TELEPHONE (OUTPATIENT)
Dept: INTERNAL MEDICINE CLINIC | Age: 51
End: 2021-12-23

## 2021-12-23 NOTE — TELEPHONE ENCOUNTER
Aramis Cano from [facility] is calling ---pt having major cold symtoms and is going to go to local urgent care to be tested for COVID----. Just a heads up.   thanks

## 2021-12-28 ENCOUNTER — HOSPITAL ENCOUNTER (OUTPATIENT)
Age: 51
Setting detail: SPECIMEN
Discharge: HOME | End: 2021-12-28
Payer: COMMERCIAL

## 2021-12-28 LAB
ANION GAP SERPL CALCULATED.3IONS-SCNC: 12 MMOL/L (ref 3–16)
BUN BLDV-MCNC: 42 MG/DL (ref 7–20)
CALCIUM SERPL-MCNC: 9.2 MG/DL (ref 8.3–10.6)
CHLORIDE BLD-SCNC: 94 MMOL/L (ref 99–110)
CO2: 29 MMOL/L (ref 21–32)
CREAT SERPL-MCNC: 1.3 MG/DL (ref 0.6–1.1)
GFR AFRICAN AMERICAN: 52
GFR NON-AFRICAN AMERICAN: 43
GLUCOSE BLD-MCNC: 293 MG/DL (ref 70–99)
POTASSIUM SERPL-SCNC: 4.7 MMOL/L (ref 3.5–5.1)
PRO-BNP: 148 PG/ML (ref 0–124)
SODIUM BLD-SCNC: 135 MMOL/L (ref 136–145)

## 2021-12-28 PROCEDURE — 36415 COLL VENOUS BLD VENIPUNCTURE: CPT

## 2021-12-28 PROCEDURE — 80048 BASIC METABOLIC PNL TOTAL CA: CPT

## 2021-12-28 PROCEDURE — 83880 ASSAY OF NATRIURETIC PEPTIDE: CPT

## 2021-12-30 ENCOUNTER — TELEPHONE (OUTPATIENT)
Dept: INTERNAL MEDICINE CLINIC | Age: 51
End: 2021-12-30

## 2021-12-30 ENCOUNTER — TELEPHONE (OUTPATIENT)
Dept: CARDIOLOGY CLINIC | Age: 51
End: 2021-12-30

## 2021-12-30 NOTE — TELEPHONE ENCOUNTER
Conner Morrison called from [facility]. Patient gets bmp other week and she wants to know long does this need to be done. Conner Morrison can be reached at [phone]. Please call to advise. Thank you.

## 2021-12-30 NOTE — TELEPHONE ENCOUNTER
Trini Brown with [facility] is requesting verbal order for recertification for skilled nursing.

## 2021-12-30 NOTE — TELEPHONE ENCOUNTER
Pt managed by Dr. Dillon Keene, labs every 2 weeks need to continue unless she changes frequency.  [facility]

## 2021-12-30 NOTE — TELEPHONE ENCOUNTER
Spoke to Gianni Adams lab draw frequency instructions. Stated she will set the pt up with [facility], since they can not keep seeing the pt, just for lab draws.

## 2022-01-06 RX ORDER — COLCHICINE 0.6 MG/1
0.6 TABLET ORAL EVERY OTHER DAY
Qty: 15 TABLET | Refills: 0 | OUTPATIENT
Start: 2022-01-06

## 2022-01-10 ENCOUNTER — HOSPITAL ENCOUNTER (OUTPATIENT)
Age: 52
Setting detail: SPECIMEN
Discharge: HOME | End: 2022-01-10
Payer: COMMERCIAL

## 2022-01-10 LAB
ANION GAP SERPL CALCULATED.3IONS-SCNC: 11 MMOL/L (ref 3–16)
BUN BLDV-MCNC: 44 MG/DL (ref 7–20)
CALCIUM SERPL-MCNC: 9.2 MG/DL (ref 8.3–10.6)
CHLORIDE BLD-SCNC: 96 MMOL/L (ref 99–110)
CO2: 30 MMOL/L (ref 21–32)
CREAT SERPL-MCNC: 1.5 MG/DL (ref 0.6–1.1)
GFR AFRICAN AMERICAN: 44
GFR NON-AFRICAN AMERICAN: 36
GLUCOSE BLD-MCNC: 204 MG/DL (ref 70–99)
POTASSIUM SERPL-SCNC: 4.7 MMOL/L (ref 3.5–5.1)
PRO-BNP: 117 PG/ML (ref 0–124)
SODIUM BLD-SCNC: 137 MMOL/L (ref 136–145)

## 2022-01-10 PROCEDURE — 80048 BASIC METABOLIC PNL TOTAL CA: CPT

## 2022-01-10 PROCEDURE — 83880 ASSAY OF NATRIURETIC PEPTIDE: CPT

## 2022-01-10 PROCEDURE — 36415 COLL VENOUS BLD VENIPUNCTURE: CPT

## 2022-01-11 ENCOUNTER — TELEPHONE (OUTPATIENT)
Dept: CARDIOLOGY CLINIC | Age: 52
End: 2022-01-11

## 2022-01-11 NOTE — TELEPHONE ENCOUNTER
----- Message from Christopher Fair MD sent at 1/10/2022  9:37 PM EST -----  Call patient. Labs are okay. No changes.   ARETHA

## 2022-01-11 NOTE — TELEPHONE ENCOUNTER
I spoke to patient with  about lab results per Dr Tamy Bronson . Patient verbalized understanding. Advised patient to call back with any questions.

## 2022-01-14 DIAGNOSIS — N28.9 RENAL INSUFFICIENCY: Chronic | ICD-10-CM

## 2022-01-14 DIAGNOSIS — I42.9 CARDIOMYOPATHY, UNSPECIFIED TYPE (HCC): Chronic | ICD-10-CM

## 2022-01-14 DIAGNOSIS — I25.10 CORONARY ARTERY DISEASE DUE TO LIPID RICH PLAQUE: Chronic | ICD-10-CM

## 2022-01-14 DIAGNOSIS — I25.83 CORONARY ARTERY DISEASE DUE TO LIPID RICH PLAQUE: Chronic | ICD-10-CM

## 2022-01-14 DIAGNOSIS — I50.30 (HFPEF) HEART FAILURE WITH PRESERVED EJECTION FRACTION (HCC): Chronic | ICD-10-CM

## 2022-01-14 DIAGNOSIS — I25.10 CORONARY ARTERY DISEASE INVOLVING NATIVE CORONARY ARTERY OF NATIVE HEART WITHOUT ANGINA PECTORIS: Chronic | ICD-10-CM

## 2022-01-14 DIAGNOSIS — K44.9 HIATAL HERNIA WITH GERD: ICD-10-CM

## 2022-01-14 DIAGNOSIS — K21.9 HIATAL HERNIA WITH GERD: ICD-10-CM

## 2022-01-14 RX ORDER — DOCUSATE SODIUM AND SENNOSIDES 50; 8.6 MG/1; MG/1
TABLET ORAL
Qty: 360 TABLET | Refills: 0 | Status: SHIPPED | OUTPATIENT
Start: 2022-01-14 | End: 2022-04-14

## 2022-01-14 RX ORDER — RANOLAZINE 500 MG/1
TABLET, EXTENDED RELEASE ORAL
Qty: 180 TABLET | Refills: 0 | Status: SHIPPED | OUTPATIENT
Start: 2022-01-14 | End: 2022-03-22

## 2022-01-14 RX ORDER — OMEPRAZOLE 20 MG/1
CAPSULE, DELAYED RELEASE ORAL
Qty: 60 CAPSULE | Refills: 0 | Status: SHIPPED | OUTPATIENT
Start: 2022-01-14 | End: 2022-02-14

## 2022-01-14 RX ORDER — SIMVASTATIN 20 MG
TABLET ORAL
Qty: 90 TABLET | Refills: 2 | Status: SHIPPED | OUTPATIENT
Start: 2022-01-14 | End: 2022-02-17

## 2022-01-24 ENCOUNTER — HOSPITAL ENCOUNTER (OUTPATIENT)
Age: 52
Setting detail: SPECIMEN
Discharge: HOME | End: 2022-01-24
Payer: COMMERCIAL

## 2022-01-24 ENCOUNTER — TELEPHONE (OUTPATIENT)
Dept: ENDOCRINOLOGY | Age: 52
End: 2022-01-24

## 2022-01-24 LAB
ANION GAP SERPL CALCULATED.3IONS-SCNC: 13 MMOL/L (ref 3–16)
BUN BLDV-MCNC: 33 MG/DL (ref 7–20)
CALCIUM SERPL-MCNC: 9.1 MG/DL (ref 8.3–10.6)
CHLORIDE BLD-SCNC: 94 MMOL/L (ref 99–110)
CO2: 30 MMOL/L (ref 21–32)
CREAT SERPL-MCNC: 1.3 MG/DL (ref 0.6–1.1)
GFR AFRICAN AMERICAN: 52
GFR NON-AFRICAN AMERICAN: 43
GLUCOSE BLD-MCNC: 306 MG/DL (ref 70–99)
POTASSIUM SERPL-SCNC: 4.7 MMOL/L (ref 3.5–5.1)
PRO-BNP: 280 PG/ML (ref 0–124)
SODIUM BLD-SCNC: 137 MMOL/L (ref 136–145)

## 2022-01-24 PROCEDURE — 83880 ASSAY OF NATRIURETIC PEPTIDE: CPT

## 2022-01-24 PROCEDURE — 36415 COLL VENOUS BLD VENIPUNCTURE: CPT

## 2022-01-24 PROCEDURE — 80048 BASIC METABOLIC PNL TOTAL CA: CPT

## 2022-01-24 NOTE — TELEPHONE ENCOUNTER
Submitted PA for Sania Knox FlexTouch (insulin degludec injection) 200 Units/mL solution   Key: [id number])  Via CMM STATUS: PENDING

## 2022-01-24 NOTE — TELEPHONE ENCOUNTER
[email or web address] [employer] Fle[employer]ouch

## 2022-01-26 ENCOUNTER — OFFICE VISIT (OUTPATIENT)
Dept: INTERNAL MEDICINE CLINIC | Age: 52
End: 2022-01-26
Payer: COMMERCIAL

## 2022-01-26 VITALS
DIASTOLIC BLOOD PRESSURE: 68 MMHG | HEART RATE: 76 BPM | SYSTOLIC BLOOD PRESSURE: 108 MMHG | OXYGEN SATURATION: 97 % | BODY MASS INDEX: 37.26 KG/M2 | WEIGHT: 190.8 LBS

## 2022-01-26 DIAGNOSIS — I50.32 CHRONIC HEART FAILURE WITH PRESERVED EJECTION FRACTION (HCC): ICD-10-CM

## 2022-01-26 DIAGNOSIS — Z85.3 HISTORY OF BREAST CANCER: ICD-10-CM

## 2022-01-26 DIAGNOSIS — M06.00 SERONEGATIVE RHEUMATOID ARTHRITIS (HCC): ICD-10-CM

## 2022-01-26 DIAGNOSIS — J45.40 MODERATE PERSISTENT ASTHMA WITHOUT COMPLICATION: ICD-10-CM

## 2022-01-26 DIAGNOSIS — E78.5 HYPERLIPIDEMIA LDL GOAL <70: ICD-10-CM

## 2022-01-26 DIAGNOSIS — G47.33 OBSTRUCTIVE SLEEP APNEA SYNDROME: ICD-10-CM

## 2022-01-26 DIAGNOSIS — I10 ESSENTIAL HYPERTENSION: ICD-10-CM

## 2022-01-26 DIAGNOSIS — G44.229 CHRONIC TENSION-TYPE HEADACHE, NOT INTRACTABLE: Primary | ICD-10-CM

## 2022-01-26 DIAGNOSIS — E89.89 LYMPHEDEMA OF UPPER EXTREMITY FOLLOWING LYMPHADENECTOMY: ICD-10-CM

## 2022-01-26 DIAGNOSIS — F33.2 SEVERE EPISODE OF RECURRENT MAJOR DEPRESSIVE DISORDER, WITHOUT PSYCHOTIC FEATURES (HCC): ICD-10-CM

## 2022-01-26 DIAGNOSIS — I89.0 LYMPHEDEMA OF UPPER EXTREMITY FOLLOWING LYMPHADENECTOMY: ICD-10-CM

## 2022-01-26 DIAGNOSIS — E11.42 DIABETIC POLYNEUROPATHY ASSOCIATED WITH TYPE 2 DIABETES MELLITUS (HCC): ICD-10-CM

## 2022-01-26 DIAGNOSIS — E66.01 MORBIDLY OBESE (HCC): ICD-10-CM

## 2022-01-26 DIAGNOSIS — I50.32 CHRONIC DIASTOLIC CONGESTIVE HEART FAILURE (HCC): ICD-10-CM

## 2022-01-26 DIAGNOSIS — E03.8 HYPOTHYROIDISM DUE TO HASHIMOTO'S THYROIDITIS: ICD-10-CM

## 2022-01-26 DIAGNOSIS — E06.3 HYPOTHYROIDISM DUE TO HASHIMOTO'S THYROIDITIS: ICD-10-CM

## 2022-01-26 DIAGNOSIS — Z60.3 IMMIGRANT WITH LANGUAGE DIFFICULTY: ICD-10-CM

## 2022-01-26 PROCEDURE — G8427 DOCREV CUR MEDS BY ELIG CLIN: HCPCS | Performed by: INTERNAL MEDICINE

## 2022-01-26 PROCEDURE — G8417 CALC BMI ABV UP PARAM F/U: HCPCS | Performed by: INTERNAL MEDICINE

## 2022-01-26 PROCEDURE — G8482 FLU IMMUNIZE ORDER/ADMIN: HCPCS | Performed by: INTERNAL MEDICINE

## 2022-01-26 PROCEDURE — 3046F HEMOGLOBIN A1C LEVEL >9.0%: CPT | Performed by: INTERNAL MEDICINE

## 2022-01-26 PROCEDURE — 99214 OFFICE O/P EST MOD 30 MIN: CPT | Performed by: INTERNAL MEDICINE

## 2022-01-26 PROCEDURE — 1036F TOBACCO NON-USER: CPT | Performed by: INTERNAL MEDICINE

## 2022-01-26 PROCEDURE — 2022F DILAT RTA XM EVC RTNOPTHY: CPT | Performed by: INTERNAL MEDICINE

## 2022-01-26 PROCEDURE — 3017F COLORECTAL CA SCREEN DOC REV: CPT | Performed by: INTERNAL MEDICINE

## 2022-01-26 RX ORDER — PETROLATUM 42 G/100G
OINTMENT TOPICAL
Qty: 454 G | Refills: 5 | Status: SHIPPED | OUTPATIENT
Start: 2022-01-26 | End: 2022-04-26 | Stop reason: SDUPTHER

## 2022-01-26 RX ORDER — ALBUTEROL SULFATE 2.5 MG/3ML
2.5 SOLUTION RESPIRATORY (INHALATION) EVERY 6 HOURS PRN
Qty: 120 EACH | Refills: 1 | Status: SHIPPED | OUTPATIENT
Start: 2022-01-26

## 2022-01-26 RX ORDER — UREA 40 G/100G
LOTION TOPICAL
Qty: 325 ML | Refills: 5 | Status: SHIPPED | OUTPATIENT
Start: 2022-01-26 | End: 2022-04-26 | Stop reason: SDUPTHER

## 2022-01-26 SDOH — SOCIAL STABILITY - SOCIAL INSECURITY: ACCULTURATION DIFFICULTY: Z60.3

## 2022-01-26 ASSESSMENT — PATIENT HEALTH QUESTIONNAIRE - PHQ9
10. IF YOU CHECKED OFF ANY PROBLEMS, HOW DIFFICULT HAVE THESE PROBLEMS MADE IT FOR YOU TO DO YOUR WORK, TAKE CARE OF THINGS AT HOME, OR GET ALONG WITH OTHER PEOPLE: 3
SUM OF ALL RESPONSES TO PHQ9 QUESTIONS 1 & 2: 2
2. FEELING DOWN, DEPRESSED OR HOPELESS: 2
SUM OF ALL RESPONSES TO PHQ QUESTIONS 1-9: 19
SUM OF ALL RESPONSES TO PHQ QUESTIONS 1-9: 22
SUM OF ALL RESPONSES TO PHQ QUESTIONS 1-9: 22
5. POOR APPETITE OR OVEREATING: 2
3. TROUBLE FALLING OR STAYING ASLEEP: 3
SUM OF ALL RESPONSES TO PHQ QUESTIONS 1-9: 22
6. FEELING BAD ABOUT YOURSELF - OR THAT YOU ARE A FAILURE OR HAVE LET YOURSELF OR YOUR FAMILY DOWN: 3
9. THOUGHTS THAT YOU WOULD BE BETTER OFF DEAD, OR OF HURTING YOURSELF: 3
8. MOVING OR SPEAKING SO SLOWLY THAT OTHER PEOPLE COULD HAVE NOTICED. OR THE OPPOSITE, BEING SO FIGETY OR RESTLESS THAT YOU HAVE BEEN MOVING AROUND A LOT MORE THAN USUAL: 3
1. LITTLE INTEREST OR PLEASURE IN DOING THINGS: 0
7. TROUBLE CONCENTRATING ON THINGS, SUCH AS READING THE NEWSPAPER OR WATCHING TELEVISION: 3
4. FEELING TIRED OR HAVING LITTLE ENERGY: 3

## 2022-01-26 ASSESSMENT — COLUMBIA-SUICIDE SEVERITY RATING SCALE - C-SSRS
1. WITHIN THE PAST MONTH, HAVE YOU WISHED YOU WERE DEAD OR WISHED YOU COULD GO TO SLEEP AND NOT WAKE UP?: YES
2. HAVE YOU ACTUALLY HAD ANY THOUGHTS OF KILLING YOURSELF?: NO
6. HAVE YOU EVER DONE ANYTHING, STARTED TO DO ANYTHING, OR PREPARED TO DO ANYTHING TO END YOUR LIFE?: NO

## 2022-01-26 NOTE — PROGRESS NOTES
Patient: Maria R Schulz is a 45 y.o. female who presents today with the following Chief Complaint(s):  Chief Complaint   Patient presents with    Check-Up       HPI     Here today for follow up. Is accompanied by her  who helps with interpretation. Is c/o severe HA. HA are posterior, occipital, and frontal. HA are not new. Had MRI in July 2021 with Dr. Dipika Larson that was unremarkable. Has been getting injections in her neck to help with her HA but have not helped. HA start in neck, radiate forward. MRI Brain 7/21:  FINDINGS:   INTRACRANIAL STRUCTURES/VENTRICLES: Edward Xie is no acute infarct. No mass   effect or midline shift. No evidence of an acute intracranial hemorrhage. The ventricles and sulci are normal in size and configuration.  The   sellar/suprasellar regions appear unremarkable.  The normal signal voids   within the major intracranial vessels appear maintained.  No abnormal focus   of enhancement is seen within the brain.       There are minimal nonspecific foci of periventricular and subcortical   cerebral white matter T2/FLAIR hyperintensity, most likely representing   chronic microangiopathic disease in this age group.       ORBITS: The visualized portion of the orbits demonstrate no acute abnormality.       SINUSES: The visualized paranasal sinuses and mastoid air cells are well   aerated.       BONES/SOFT TISSUES: The bone marrow signal intensity appears normal. The soft   tissues demonstrate no acute abnormality.           Impression   Stable study.       Minimal chronic microangiopathic ischemic disease.       Otherwise unremarkable contrast enhanced brain MRI.  No focus of abnormal   enhancement.  No evidence of intracranial metastatic disease.  No acute   infarction, intracranial hemorrhage or mass lesion. Her depression is severe. Has a f/u with psychiatry in February (2/10/22), and last saw him in December.  Does sometimes wishes she was dead, would not hurt herself however. States it would be against her Orthodoxy to hurt herself. Wishes that she was dead d/t her pain and her multiple health problems. Also sad as her cat has a tumor and is getting CTX which has helped. Has severe swelling in her left hand and arm. Uses a lymphedema pump which does help with the swelling but she is not able to do any work while wearing the machine. Will use the pump at least once daily, sometimes BID. Is concerned as she will lose weight and then gain 10 pounds all at once. Is c/o being cold all of the time. C/o dry skin on her hands. Is difficult to get not dry. Right 4th finger with breakage and cracking of nail. Tenderness around nail bed. Sometimes will have wheezing and difficulty breathing and give her chest pains, gets better with oxygen, out of albuterol and is asking for a refill. Labs Renal Latest Ref Rng & Units 1/24/2022 1/10/2022 12/28/2021 12/13/2021 11/29/2021   BUN 7 - 20 mg/dL 33(H) 44(H) 42(H) 54(H) -   Cr 0.6 - 1.1 mg/dL 1.3(H) 1.5(H) 1. 3(H) 1. 3(H) -   K 3.5 - 5.1 mmol/L 4.7 4.7 4.7 4.2 4.8   Na 136 - 145 mmol/L 137 137 135(L) 141 -     Lab Results   Component Value Date    WBC 9.1 10/13/2021    HGB 11.2 (L) 10/13/2021    HCT 34.5 (L) 10/13/2021    MCV 90.2 10/13/2021     10/13/2021     Lab Results   Component Value Date    CHOL 177 02/18/2019    CHOL 194 04/30/2018     Lab Results   Component Value Date    TRIG 409 (H) 02/18/2019    TRIG 406 (H) 04/30/2018     Lab Results   Component Value Date    HDL 38 (L) 02/18/2019    HDL 38 (L) 04/30/2018     Lab Results   Component Value Date    LDLCALC see below 02/18/2019    LDLCALC see below 04/30/2018     Lab Results   Component Value Date    LABVLDL see below 02/18/2019    LABVLDL see below 04/30/2018     No results found for: [facility]  Lab Results   Component Value Date    LABA1C 8.6 02/02/2021    LABA1C 8.4 01/26/2021    LABA1C 8.5 01/19/2021     Lab Results   Component Value Date [place] Not Indicated 07/13/2021    [address] see below 02/18/2019    CREATININE 1.3 (H) 01/24/2022         Allergies   Allergen Reactions    Iv Dye [Iodides] Anaphylaxis     allergic to ct scan dye, not the MRI    Diazepam Other (See Comments)    Insulin Glargine Other (See Comments)     High blood sugar     Trazodone And Nefazodone Other (See Comments)     Makes patient feel very sluggish      Past Medical History:   Diagnosis Date    Anxiety     Anxiety and depression     Arthritis     not sure of specific type    Asthma     CAD (coronary artery disease)     Cancer ([facility] 2007    left breast    Cerebral artery occlusion with cerebral infarction ([facility]     2000, 2001    CHF (congestive heart failure) ([facility] 2018    Chronic kidney disease     renal insufficency/to see Dr Namita Cedillo    Chronic pain     Constipation     COPD (chronic obstructive pulmonary disease) ([facility]     Depression     Diabetes mellitus ([facility]     Diabetic polyneuropathy associated with type 2 diabetes mellitus ([facility] 2/2/2018    Dysthymia 2/2/2018    ESBL (extended spectrum beta-lactamase) producing bacteria infection 03/14/2018    urine    Fibromyalgia     Gastric ulcer, unspecified as acute or chronic, without mention of hemorrhage, perforation, or obstruction     GERD (gastroesophageal reflux disease)     Gout     HIGH CHOLESTEROL     Hypertension     Hypothyroidism     Severe persistent asthma without complication 4/8/0612    Thyroid disease     TIA (transient ischemic attack)     with occasional left leg and hand weakness      Past Surgical History:   Procedure Laterality Date    BREAST SURGERY      left mastectomy    CARPAL TUNNEL RELEASE      Bilateral    FINGER CONTRACTURE SURGERY      HYSTERECTOMY  2005    USO    TONSILLECTOMY      UPPER GASTROINTESTINAL ENDOSCOPY  2019    stretched esophagous       Social History     Socioeconomic History    Marital status:      Spouse name: Breanna Chirinos Number of children: 3    Years of education: Not on file    Highest education level: Not on file   Occupational History    Occupation: disabled   Tobacco Use    Smoking status: Never Smoker    Smokeless tobacco: Never Used   Vaping Use    Vaping Use: Never used   Substance and Sexual Activity    Alcohol use: No    Drug use: No    Sexual activity: Not Currently   Other Topics Concern    Not on file   Social History Narrative    Not on file     Social Determinants of Health     Financial Resource Strain: Low Risk     Difficulty of Paying Living Expenses: Not hard at all   Food Insecurity: No Food Insecurity    Worried About [address] in the Last Year: Never true    [facility] in the Last Year: Never true   Transportation Needs:     Lack of Transportation (Medical): Not on file    Lack of Transportation (Non-Medical):  Not on file   Physical Activity:     Days of Exercise per Week: Not on file    Minutes of Exercise per Session: Not on file   Stress:     Feeling of Stress : Not on file   Social Connections:     Frequency of Communication with Friends and Family: Not on file    Frequency of Social Gatherings with Friends and Family: Not on file    Attends Islam Services: Not on file    Active Member of [address] [place] [address] or Organizations: Not on file    Attends Club or Organization Meetings: Not on file    Marital Status: Not on file   Intimate Partner Violence:     Fear of Current or Ex-Partner: Not on file    Emotionally Abused: Not on file    Physically Abused: Not on file    Sexually Abused: Not on file   Housing Stability:     Unable to Pay for Housing in the Last Year: Not on file    Number of [place] in the Last Year: Not on file    Unstable Housing in the Last Year: Not on file     Family History   Problem Relation Age of Onset    Asthma Other     Cancer Other     Depression Other     Diabetes Other     Hypertension Other     High Cholesterol Other     Migraines Other     Heart Attack Father 72         of MI    High Blood Pressure Mother     Diabetes Mother         Outpatient Medications Prior to Visit   Medication Sig Dispense Refill    simvastatin (ZOCOR) 20 MG tablet TAKE 1 TABLET BY MOUTH EVERY DAY AT NIGHT 90 tablet 2    metoprolol tartrate (LOPRESSOR) 25 MG tablet TAKE 1 TABLET BY MOUTH TWO TIMES A  tablet 2    ranolazine (RANEXA) 500 MG extended release tablet TAKE 1 TABLET BY MOUTH TWO TIMES A  tablet 0    omeprazole (PRILOSEC) 20 MG delayed release capsule TAKE 1 CAPSULE BY MOUTH TWO TIMES A DAY 60 capsule 0    SM STOOL SOFTENER/LAXATIVE 8.6-50 MG per tablet TAKE 2 TABLETS BY MOUTH TWO TIMES A  tablet 0    AMITIZA 24 MCG capsule TAKE 1 CAPSULE BY MOUTH TWO TIMES A DAY WITH MEALS 60 capsule 2    sulfaSALAzine (AZULFIDINE) 500 MG tablet TAKE 1 TABLET BY MOUTH TWO TIMES A DAY 60 tablet 0    FEROSUL 325 (65 Fe) MG tablet TAKE 1 TABLET BY MOUTH TWO TIMES A DAY WITH MEALS 180 tablet 0    STEGLATRO 5 MG TABS TAKE 1 TABLET BY MOUTH ONCE DAILY 90 tablet 1    butalbital-acetaminophen-caffeine (FIORICET, ESGIC) -40 MG per tablet TAKE 1 TABLET BY MOUTH EVERY SIX HOURS AS NEEDED FOR HEADACHE 30 tablet 3    insulin aspart (NOVOLOG) 100 UNIT/ML injection vial Use up to 30 units with each meal 3 each 3    insulin aspart (NOVOLOG FLEXPEN) 100 UNIT/ML injection pen INJECT 30 TO 50 UNITS INTO THE SKIN THREE TIMES DAILY BEFORE MEALS 20 pen 3    leflunomide (ARAVA) 20 MG tablet Take 1 tablet by mouth daily 30 tablet 2    spironolactone (ALDACTONE) 50 MG tablet TAKE 2 TABLETS BY MOUTH IN THE MORNING AND THEN TAKE 1 TABLET BY MOUTH IN THE EVENING 270 tablet 3    montelukast (SINGULAIR) 10 MG tablet Take 1 tablet by mouth nightly 30 tablet 5    blood glucose test strips (ONETOUCH VERIO) strip Test 5 times daily.  200 each 5    polyethylene glycol (GLYCOLAX) 17 GM/SCOOP powder TAKE 17 GRAMS (1 CAPFUL) BY MOUTH NIGHTLY 510 g 0    metOLazone (ZAROXOLYN) 5 MG tablet Take 7.5 mg by mouth Twice a Week Takes on Wed and Sunday only.  LINZESS 290 MCG CAPS capsule TAKE 1 CAPSULE BY MOUTH ONE TIME A DAY FOR 90 DAYS      REXULTI 1 MG TABS tablet TAKE 1 TABLET BY MOUTH EVERY NIGHT      Febuxostat 80 MG TABS Take 80 mg by mouth daily 30 tablet 1    levothyroxine (SYNTHROID) 150 MCG tablet TAKE 1 TABLET BY MOUTH IN THE MORNING (BEFORE BREAKFAST) 90 tablet 1    methocarbamol (ROBAXIN-750) 750 MG tablet Take 1 tablet by mouth 3 times daily as needed (pain and spasm) 30 tablet 0    nitroGLYCERIN (NITROSTAT) 0.4 MG SL tablet PLACE ONE TABLET UNDER TONGUE AS NEEDED FOR CHEST PAIN, MAY REPEAT EVERY 5 MINUTES AS NEEDED UP TO A MAX OF 3 TABLETS. IF NO RELIEF AFTER 1 DOSE, CALL 911. 25 tablet 0    Blood Glucose Monitoring Suppl (ONETOUCH VERIO) w/Device KIT Use to check glucose 4 times daily.  1 kit 0    torsemide (DEMADEX) 100 MG tablet TAKE 1 TABLET BY MOUTH IN THE MORNING AND 1/2 TABLET BY MOUTH IN THE EVENING 135 tablet 3    Insulin Pen Needle (B-D UF III MINI PEN NEEDLES) 31G X 5 MM MISC use five times daily with insulin 200 each 5    vitamin D3 (CHOLECALCIFEROL) 25 MCG (1000 UT) TABS tablet TAKE 3 TABLETS BY MOUTH ONE TIME A DAY 90 tablet 5    Insulin Degludec (TRESIBA FLEXTOUCH) 200 UNIT/ML SOPN inject 200 units subcutaneously once daily (Patient taking differently: 90 Units 2 times daily inject 200 units subcutaneously once daily) 20 pen 3    nystatin (MYCOSTATIN) 224726 UNIT/ML suspension Take 5 mLs by mouth 4 times daily 500 mL 1    Magic Mouthwash (MIRACLE MOUTHWASH) Swish and spit 5 mLs 4 times daily as needed for Irritation or Pain 300 mL 2    traZODone (DESYREL) 50 MG tablet Take 1 tablet by mouth nightly Take it on the day of sleep study 1 tablet 0    loratadine (CLARITIN) 10 MG tablet TAKE 1 TABLET BY MOUTH ONE TIME A DAY  30 tablet 5    lidocaine viscous hcl (XYLOCAINE) 2 % SOLN solution Take 5 mLs by mouth every 3 hours as needed for Irritation or Pain 100 mL 2    ketoconazole (NIZORAL) 2 % shampoo Apply topically daily as needed. 120 mL 1    diazePAM (VALIUM) 5 MG tablet Take 5 mg by mouth 2 times daily as needed for Anxiety.  cariprazine hcl (VRAYLAR) 1.5 MG capsule Take 3 mg by mouth daily       pregabalin (LYRICA) 50 MG capsule Take 50 mg by mouth 2 times daily.  OXYGEN Inhale 2 L into the lungs nightly Sometimes with activity      oxyCODONE (OXYCONTIN) 20 MG extended release tablet Take 20 mg by mouth every 12 hours.  aspirin 81 MG EC tablet Take 81 mg by mouth daily      benztropine (COGENTIN) 1 MG tablet Take 1 mg by mouth nightly       folic acid (FOLVITE) 1 MG tablet Take 1 mg by mouth daily      oxyCODONE-acetaminophen (PERCOCET)  MG per tablet Take 1 tablet by mouth every 4 hours as needed for Pain . Earliest Fill Date: 6/8/17 100 tablet 0    duloxetine (CYMBALTA) 60 MG capsule Take 60 mg by mouth 2 times daily. No facility-administered medications prior to visit. Patient'spast medical history, surgical history, family history, medications,  and allergies  were all reviewed and updated as appropriate today. Review of Systems    /68   Pulse 76   Wt 190 lb 12.8 oz (86.5 kg)   SpO2 97%   BMI 37.26 kg/m²   Physical Exam  Vitals and nursing note reviewed. Constitutional:       Appearance: She is well-developed. She is not toxic-appearing. HENT:      Head: Normocephalic. Right Ear: Tympanic membrane, ear canal and external ear normal.      Left Ear: Tympanic membrane, ear canal and external ear normal.      Mouth/Throat:      Pharynx: No oropharyngeal exudate or posterior oropharyngeal erythema. Eyes:      General: No scleral icterus. Extraocular Movements: Extraocular movements intact. Conjunctiva/sclera: Conjunctivae normal.      Pupils: Pupils are equal, round, and reactive to light. Neck:      Thyroid: No thyroid mass or thyromegaly.       Vascular: No carotid bruit. Cardiovascular:      Rate and Rhythm: Normal rate and regular rhythm. Heart sounds: Normal heart sounds. No murmur heard. Pulmonary:      Effort: Pulmonary effort is normal.      Breath sounds: Examination of the right-upper field reveals wheezing. Examination of the left-upper field reveals wheezing. Examination of the right-middle field reveals wheezing. Examination of the left-middle field reveals wheezing. Examination of the right-lower field reveals wheezing. Examination of the left-lower field reveals wheezing. Wheezing (mild wheezing) present. Musculoskeletal:      Right lower leg: No edema. Left lower leg: No edema. Lymphadenopathy:      Cervical: No cervical adenopathy. Neurological:      General: No focal deficit present. Mental Status: She is alert and oriented to person, place, and time. Psychiatric:         Mood and Affect: Mood normal.         Behavior: Behavior normal. Behavior is cooperative. ASSESSMENT/PLAN:    Problem List Items Addressed This Visit     Asthma     Stable on Proventil nebulizers and Singulair. Relevant Medications    albuterol (PROVENTIL) (2.5 MG/3ML) 0.083% nebulizer solution    Chronic congestive heart failure ([facility]     Management as per Dr. Meeta Pederson. Remains on Aldactone 100 mg a.m./50 mg p.m., Demadex 100 mg a.m./50 mg p.m., Zaroxolyn 7.5 mg twice weekly, and Lopressor 25 mg twice daily. Chronic heart failure with preserved ejection fraction (HCC) (Chronic)     Follows with Dr. Meeta Pederson for cardiology. Remains on Aldactone 100 mg a.m./50 mg p.m., Demadex 100 mg a.m./50 mg p.m., Zaroxolyn 7.5 mg twice weekly, and Lopressor 25 mg twice daily. Diabetic polyneuropathy associated with type 2 diabetes mellitus ([facility]     Follows with Dr. Fan Herrera. Essential hypertension (Chronic)     Well-controlled.  Remains on Aldactone 100 mg a.m./50 mg p.m., Demadex 100 mg a.m./50 mg p.m., Zaroxolyn 7.5 mg twice weekly, and Lopressor 25 mg twice daily. Relevant Orders    CBC Auto Differential    History of breast cancer     Following with Dr. Anup Mccullough for oncology now that Dr. Shara Ward has retired. Hyperlipidemia LDL goal <70     Remains on simvastatin 20 mg daily. Check lipid panel. Hypothyroidism     Follows with Dr. Kye Salamanca. Immigrant with language difficulty     Culturally patient's  provides interpretation. Lymphedema of upper extremity following lymphadenectomy     Patient wears a lymphedema sleeve and has a lymphedema pump. Secondary to axillary lymph node dissection due to breast cancer with left mastectomy. Morbidly obese ([address]     Works on diet. Obstructive sleep apnea syndrome     On nocturnal oxygen. Does not tolerate CPAP. Seronegative rheumatoid arthritis ([facility]     Following with Dr. Ashlyn Grajeda for rheumatology. Severe episode of recurrent major depressive disorder [facility])     Patient follows with psychiatry. She is not actively suicidal but does not feel she has a good quality of life. She states that she would never hurt herself because of her Zoroastrianism and her ioana. Patient has a follow-up appointment with her psychiatrist in February. She is seen every 2 months. Tension type headache - Primary     Patient has been getting injections in her neck through pain management without improvement in headaches. Referral placed to neurology. I wonder if she would benefit from Botox injections. She did have an MRI of her head in July 2021 that was unremarkable. Relevant Orders    [facility] - Pam Zelaya MD, Neurology, [facility]      Other Visit Diagnoses     Uncontrolled type 2 diabetes mellitus with microalbuminuria [facility])              Current Outpatient Medications   Medication Sig Dispense Refill    triamcinolone (KENALOG) 0.1 % ointment Apply think layer to hands and fingers 2 times daily prn, cover with Aquaphor.  Use for 7 days on/7 days off. 80 g 2    Urea 40 % LOTN Apply to feet nightly and cover with Aquaphor 325 mL 5    mineral oil-hydrophilic petrolatum (HYDROPHOR) ointment Apply topically as needed to hands and feet. 454 g 5    mupirocin (BACTROBAN) 2 % ointment Apply topically 3 times daily as needed for sores or cuts.  30 g 3    albuterol (PROVENTIL) (2.5 MG/3ML) 0.083% nebulizer solution Take 3 mLs by nebulization every 6 hours as needed for Wheezing or Shortness of Breath 120 each 1    simvastatin (ZOCOR) 20 MG tablet TAKE 1 TABLET BY MOUTH EVERY DAY AT NIGHT 90 tablet 2    metoprolol tartrate (LOPRESSOR) 25 MG tablet TAKE 1 TABLET BY MOUTH TWO TIMES A  tablet 2    ranolazine (RANEXA) 500 MG extended release tablet TAKE 1 TABLET BY MOUTH TWO TIMES A  tablet 0    omeprazole (PRILOSEC) 20 MG delayed release capsule TAKE 1 CAPSULE BY MOUTH TWO TIMES A DAY 60 capsule 0    SM STOOL SOFTENER/LAXATIVE 8.6-50 MG per tablet TAKE 2 TABLETS BY MOUTH TWO TIMES A  tablet 0    AMITIZA 24 MCG capsule TAKE 1 CAPSULE BY MOUTH TWO TIMES A DAY WITH MEALS 60 capsule 2    sulfaSALAzine (AZULFIDINE) 500 MG tablet TAKE 1 TABLET BY MOUTH TWO TIMES A DAY 60 tablet 0    FEROSUL 325 (65 Fe) MG tablet TAKE 1 TABLET BY MOUTH TWO TIMES A DAY WITH MEALS 180 tablet 0    STEGLATRO 5 MG TABS TAKE 1 TABLET BY MOUTH ONCE DAILY 90 tablet 1    butalbital-acetaminophen-caffeine (FIORICET, ESGIC) -40 MG per tablet TAKE 1 TABLET BY MOUTH EVERY SIX HOURS AS NEEDED FOR HEADACHE 30 tablet 3    insulin aspart (NOVOLOG) 100 UNIT/ML injection vial Use up to 30 units with each meal 3 each 3    insulin aspart (NOVOLOG FLEXPEN) 100 UNIT/ML injection pen INJECT 30 TO 50 UNITS INTO THE SKIN THREE TIMES DAILY BEFORE MEALS 20 pen 3    leflunomide (ARAVA) 20 MG tablet Take 1 tablet by mouth daily 30 tablet 2    spironolactone (ALDACTONE) 50 MG tablet TAKE 2 TABLETS BY MOUTH IN THE MORNING AND THEN TAKE 1 TABLET BY MOUTH IN THE EVENING 270 tablet 3    montelukast (SINGULAIR) 10 MG tablet Take 1 tablet by mouth nightly 30 tablet 5    blood glucose test strips (ONETOUCH VERIO) strip Test 5 times daily. 200 each 5    polyethylene glycol (GLYCOLAX) 17 GM/SCOOP powder TAKE 17 GRAMS (1 CAPFUL) BY MOUTH NIGHTLY 510 g 0    metOLazone (ZAROXOLYN) 5 MG tablet Take 7.5 mg by mouth Twice a Week Takes on Wed and Sunday only.  LINZESS 290 MCG CAPS capsule TAKE 1 CAPSULE BY MOUTH ONE TIME A DAY FOR 90 DAYS      REXULTI 1 MG TABS tablet TAKE 1 TABLET BY MOUTH EVERY NIGHT      Febuxostat 80 MG TABS Take 80 mg by mouth daily 30 tablet 1    levothyroxine (SYNTHROID) 150 MCG tablet TAKE 1 TABLET BY MOUTH IN THE MORNING (BEFORE BREAKFAST) 90 tablet 1    methocarbamol (ROBAXIN-750) 750 MG tablet Take 1 tablet by mouth 3 times daily as needed (pain and spasm) 30 tablet 0    nitroGLYCERIN (NITROSTAT) 0.4 MG SL tablet PLACE ONE TABLET UNDER TONGUE AS NEEDED FOR CHEST PAIN, MAY REPEAT EVERY 5 MINUTES AS NEEDED UP TO A MAX OF 3 TABLETS. IF NO RELIEF AFTER 1 DOSE, CALL 911. 25 tablet 0    Blood Glucose Monitoring Suppl (ONETOUCH VERIO) w/Device KIT Use to check glucose 4 times daily.  1 kit 0    torsemide (DEMADEX) 100 MG tablet TAKE 1 TABLET BY MOUTH IN THE MORNING AND 1/2 TABLET BY MOUTH IN THE EVENING 135 tablet 3    Insulin Pen Needle (B-D UF III MINI PEN NEEDLES) 31G X 5 MM MISC use five times daily with insulin 200 each 5    vitamin D3 (CHOLECALCIFEROL) 25 MCG (1000 UT) TABS tablet TAKE 3 TABLETS BY MOUTH ONE TIME A DAY 90 tablet 5    Insulin Degludec (TRESIBA FLEXTOUCH) 200 UNIT/ML SOPN inject 200 units subcutaneously once daily (Patient taking differently: 90 Units 2 times daily inject 200 units subcutaneously once daily) 20 pen 3    nystatin (MYCOSTATIN) 867416 UNIT/ML suspension Take 5 mLs by mouth 4 times daily 500 mL 1    Magic Mouthwash (MIRACLE MOUTHWASH) Swish and spit 5 mLs 4 times daily as needed for Irritation or Pain 300 mL 2

## 2022-01-27 ENCOUNTER — TELEPHONE (OUTPATIENT)
Dept: CARDIOLOGY CLINIC | Age: 52
End: 2022-01-27

## 2022-01-27 NOTE — TELEPHONE ENCOUNTER
----- Message from HERMES Bustamante CNP sent at 1/27/2022  9:29 AM EST -----  Wt stable at PCP yesterday, no change in meds.  Zion Laguerre

## 2022-01-27 NOTE — TELEPHONE ENCOUNTER
Spoke to . She has a HA and chest pain x 2 episodes     Her O2 is 94%. He gave her 1 tab nitro x 2 episodes. She also had sweating at the time. No nausea. July 2020 stress test Normal.  2/2018 Non obstructive CAD to the LAD and 1st OM. /68 and today 111/54. HR 76  Weight stable at 192-194. When weight was 195-197 and she was sent to the infusion center back in August 2021. Updated Dr. Beyer who feels this is not cardiac related and no labs or tests need to be done. Called and left message with pt/ that there is no treatment at this time and that it is felt to be non cardiac chest pain.

## 2022-01-27 NOTE — TELEPHONE ENCOUNTER
I spoke wit pt  and relayed message per Jimi Velazquez. He verbalized understanding. He states that pt is having CP, sometime sever, brief but sharp and nitro relieves. Wants to know if they should go to the ER or should they get testing.  Pt has an appt 2/11/22

## 2022-01-30 PROBLEM — J96.01 ACUTE HYPOXEMIC RESPIRATORY FAILURE (HCC): Status: RESOLVED | Noted: 2020-10-29 | Resolved: 2022-01-30

## 2022-01-30 PROBLEM — B36.9 FUNGAL DERMATITIS: Status: RESOLVED | Noted: 2018-07-03 | Resolved: 2022-01-30

## 2022-01-30 PROBLEM — L72.9 SCALP CYST: Status: RESOLVED | Noted: 2020-09-23 | Resolved: 2022-01-30

## 2022-01-30 PROBLEM — K14.6 TONGUE PAIN: Status: RESOLVED | Noted: 2020-02-02 | Resolved: 2022-01-30

## 2022-01-30 PROBLEM — K12.0 APHTHOUS ULCER OF TONGUE: Status: RESOLVED | Noted: 2020-08-16 | Resolved: 2022-01-30
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
## 2022-01-30 PROBLEM — R39.15 URINARY URGENCY: Status: RESOLVED | Noted: 2021-05-09 | Resolved: 2022-01-30

## 2022-01-30 PROBLEM — R73.9 HYPERGLYCEMIA: Status: RESOLVED | Noted: 2020-04-29 | Resolved: 2022-01-30

## 2022-01-30 PROBLEM — R07.9 CHEST PAIN: Status: RESOLVED | Noted: 2019-10-31 | Resolved: 2022-01-30

## 2022-01-30 PROBLEM — B37.0 THRUSH: Status: RESOLVED | Noted: 2020-09-23 | Resolved: 2022-01-30

## 2022-01-30 PROBLEM — J96.01 ACUTE RESPIRATORY FAILURE WITH HYPOXEMIA (HCC): Status: RESOLVED | Noted: 2020-03-25 | Resolved: 2022-01-30

## 2022-01-30 PROBLEM — M06.00 SERONEGATIVE RHEUMATOID ARTHRITIS (HCC): Status: ACTIVE | Noted: 2018-03-13

## 2022-01-31 NOTE — ASSESSMENT & PLAN NOTE
Follows with Dr. Jessica Denny for cardiology. Remains on Aldactone 100 mg a.m./50 mg p.m., Demadex 100 mg a.m./50 mg p.m., Zaroxolyn 7.5 mg twice weekly, and Lopressor 25 mg twice daily.

## 2022-01-31 NOTE — ASSESSMENT & PLAN NOTE
Well-controlled. Remains on Aldactone 100 mg a.m./50 mg p.m., Demadex 100 mg a.m./50 mg p.m., Zaroxolyn 7.5 mg twice weekly, and Lopressor 25 mg twice daily.

## 2022-01-31 NOTE — ASSESSMENT & PLAN NOTE
Patient follows with psychiatry. She is not actively suicidal but does not feel she has a good quality of life. She states that she would never hurt herself because of her Mormon and her ioana. Patient has a follow-up appointment with her psychiatrist in February. She is seen every 2 months.

## 2022-01-31 NOTE — ASSESSMENT & PLAN NOTE
Patient has been getting injections in her neck through pain management without improvement in headaches. Referral placed to neurology. I wonder if she would benefit from Botox injections. She did have an MRI of her head in July 2021 that was unremarkable.

## 2022-01-31 NOTE — ASSESSMENT & PLAN NOTE
Patient wears a lymphedema sleeve and has a lymphedema pump. Secondary to axillary lymph node dissection due to breast cancer with left mastectomy.

## 2022-01-31 NOTE — ASSESSMENT & PLAN NOTE
Management as per Dr. Ashely Rios. Remains on Aldactone 100 mg a.m./50 mg p.m., Demadex 100 mg a.m./50 mg p.m., Zaroxolyn 7.5 mg twice weekly, and Lopressor 25 mg twice daily.

## 2022-02-02 RX ORDER — SULFASALAZINE 500 MG/1
TABLET ORAL
Qty: 60 TABLET | Refills: 0 | OUTPATIENT
Start: 2022-02-02

## 2022-02-02 RX ORDER — LEFLUNOMIDE 20 MG/1
TABLET ORAL
Qty: 30 TABLET | Refills: 0 | OUTPATIENT
Start: 2022-02-02

## 2022-02-08 ENCOUNTER — HOSPITAL ENCOUNTER (OUTPATIENT)
Age: 52
Setting detail: SPECIMEN
Discharge: HOME | End: 2022-02-08
Payer: COMMERCIAL

## 2022-02-08 ENCOUNTER — TELEPHONE (OUTPATIENT)
Dept: INTERNAL MEDICINE CLINIC | Age: 52
End: 2022-02-08

## 2022-02-08 LAB
ANION GAP SERPL CALCULATED.3IONS-SCNC: 12 MMOL/L (ref 3–16)
BASOPHILS ABSOLUTE: 0.1 K/UL (ref 0–0.2)
BASOPHILS RELATIVE PERCENT: 0.7 %
BUN BLDV-MCNC: 34 MG/DL (ref 7–20)
CALCIUM SERPL-MCNC: 9.8 MG/DL (ref 8.3–10.6)
CHLORIDE BLD-SCNC: 94 MMOL/L (ref 99–110)
CO2: 30 MMOL/L (ref 21–32)
CREAT SERPL-MCNC: 1.3 MG/DL (ref 0.6–1.1)
EOSINOPHILS ABSOLUTE: 0.1 K/UL (ref 0–0.6)
EOSINOPHILS RELATIVE PERCENT: 1.1 %
GFR AFRICAN AMERICAN: 52
GFR NON-AFRICAN AMERICAN: 43
GLUCOSE BLD-MCNC: 207 MG/DL (ref 70–99)
HCT VFR BLD CALC: 36.7 % (ref 36–48)
HEMOGLOBIN: 12.1 G/DL (ref 12–16)
LYMPHOCYTES ABSOLUTE: 2.2 K/UL (ref 1–5.1)
LYMPHOCYTES RELATIVE PERCENT: 25.7 %
MCH RBC QN AUTO: 29.1 PG (ref 26–34)
MCHC RBC AUTO-ENTMCNC: 33 G/DL (ref 31–36)
MCV RBC AUTO: 88.2 FL (ref 80–100)
MONOCYTES ABSOLUTE: 0.5 K/UL (ref 0–1.3)
MONOCYTES RELATIVE PERCENT: 6.3 %
NEUTROPHILS ABSOLUTE: 5.7 K/UL (ref 1.7–7.7)
NEUTROPHILS RELATIVE PERCENT: 66.2 %
PDW BLD-RTO: 14 % (ref 12.4–15.4)
PLATELET # BLD: 257 K/UL (ref 135–450)
PMV BLD AUTO: 10.2 FL (ref 5–10.5)
POTASSIUM SERPL-SCNC: 4.7 MMOL/L (ref 3.5–5.1)
PRO-BNP: 508 PG/ML (ref 0–124)
RBC # BLD: 4.16 M/UL (ref 4–5.2)
SODIUM BLD-SCNC: 136 MMOL/L (ref 136–145)
WBC # BLD: 8.7 K/UL (ref 4–11)

## 2022-02-08 PROCEDURE — 83880 ASSAY OF NATRIURETIC PEPTIDE: CPT

## 2022-02-08 PROCEDURE — 36415 COLL VENOUS BLD VENIPUNCTURE: CPT

## 2022-02-08 PROCEDURE — 80048 BASIC METABOLIC PNL TOTAL CA: CPT

## 2022-02-08 PROCEDURE — 85025 COMPLETE CBC W/AUTO DIFF WBC: CPT

## 2022-02-08 NOTE — TELEPHONE ENCOUNTER
Betsy Garcia is calling about the pt last visit in January----trying to do pt medicare oxygen paperwork---need to see if oxygen was discussed at the appt in Jan. Please call Betsy at [facility]  Thanks.

## 2022-02-08 NOTE — TELEPHONE ENCOUNTER
Left message for Betsy to return my call. When she calls back please let her know Dr Dania Marquez would like them to contact her Pulmonary Dr Dr Rufus Sharpe.

## 2022-02-10 ENCOUNTER — OFFICE VISIT (OUTPATIENT)
Dept: PULMONOLOGY | Age: 52
End: 2022-02-10
Payer: COMMERCIAL

## 2022-02-10 VITALS
OXYGEN SATURATION: 89 % | WEIGHT: 193 LBS | BODY MASS INDEX: 37.69 KG/M2 | SYSTOLIC BLOOD PRESSURE: 117 MMHG | DIASTOLIC BLOOD PRESSURE: 70 MMHG

## 2022-02-10 DIAGNOSIS — G47.33 OSA (OBSTRUCTIVE SLEEP APNEA): ICD-10-CM

## 2022-02-10 DIAGNOSIS — J96.11 CHRONIC RESPIRATORY FAILURE WITH HYPOXIA (HCC): ICD-10-CM

## 2022-02-10 DIAGNOSIS — J45.30 MILD PERSISTENT ASTHMA WITHOUT COMPLICATION: Primary | ICD-10-CM

## 2022-02-10 PROCEDURE — G8427 DOCREV CUR MEDS BY ELIG CLIN: HCPCS | Performed by: INTERNAL MEDICINE

## 2022-02-10 PROCEDURE — G8417 CALC BMI ABV UP PARAM F/U: HCPCS | Performed by: INTERNAL MEDICINE

## 2022-02-10 PROCEDURE — G8482 FLU IMMUNIZE ORDER/ADMIN: HCPCS | Performed by: INTERNAL MEDICINE

## 2022-02-10 PROCEDURE — 99214 OFFICE O/P EST MOD 30 MIN: CPT | Performed by: INTERNAL MEDICINE

## 2022-02-10 PROCEDURE — 3017F COLORECTAL CA SCREEN DOC REV: CPT | Performed by: INTERNAL MEDICINE

## 2022-02-10 PROCEDURE — 1036F TOBACCO NON-USER: CPT | Performed by: INTERNAL MEDICINE

## 2022-02-10 RX ORDER — LUBIPROSTONE 24 UG/1
CAPSULE, GELATIN COATED ORAL
Qty: 60 CAPSULE | Refills: 0 | Status: SHIPPED | OUTPATIENT
Start: 2022-02-10 | End: 2022-03-15

## 2022-02-10 NOTE — PROGRESS NOTES
PULMONARY OFFICE FOLLOW UP NOTE    REASON FOR VISIT:   Chief Complaint   Patient presents with    Sleep Apnea     BFU       DATE OF VISIT: 2/10/2022    HISTORY OF PRESENT ILLNESS: 45y.o. year old female is here for follow-up of asthma, chronic hypoxic respiratory failure on home oxygen at 2 L/min, heart failure with preserved ejection fraction, obstructive sleep apnea not on CPAP/ BIPAP. she has PMH of history of breast cancer, depression, hypothyroidism, diabetes mellitus type 2.      Patient underwent BiPAP titration study in December 2020 and BiPAP 10/7 was prescribed. The prescription was sent to [facility]. Patient is not using BiPAP. She stated that she cannot tolerate BiPAP. She does not have the machine. Denies any worsening shortness of breath. Denies any worsening cough. Her weight fluctuates. Continues to have lower extremity edema. Clinically to use oxygen at 2 L/min.       Her baseline weight is around 180 pounds.  Continues to take torsemide, Aldactone and metolazone.     Her bronchodilator regimen for asthma consist of Dulera 2 puffs twice daily along with albuterol inhaler as needed.  She also use albuterol nebs twice a day. She also takes Singulair 10 mg daily.     Patient was diagnosed with obstructive sleep apnea many years ago. Ethan Camarillo had 2 sleep studies, one 10 years ago and other close to 5 years ago. Maksim Sherman last sleep study was performed at [place] [id number]  states that she had two CPAP machines at home. Ethan Camarillo could not get used to the CPAP and stopped wearing them. Ethan Camarillo only wears oxygen to sleep at night.  She underwent repeat baseline sleep study on 11/9/2020 that showed mild obstructive sleep apnea with AHI 5.8.  However, patient did not sleep very well during the sleep study and did not get enough REM sleep.  She was saturating less than 90% 404 minutes.  Because of patient's cardiovascular risk factors, CPAP titration study was recommended.   She underwent CPAP titration study on 12/17/2028 and CPAP could not control LUIS. BiPAP 12/7 was prescribed. However, patient has not received her machine yet.          Diagnostic test reviewed:  I reviewed the chest x-ray from 3/27/2020 and my interpretation is as follows.  Increased pulmonary vascular congestion.     I reviewed the PFT from 5/10/2018 and my interpretation is as follows.  restrictive lung disease with reduced diffusion capacity.     Echocardiogram from 9/20/2020 showed preserved EF of 55 to 60%     Sleep study from 11/9/2020 showed AHI of 5.8/h with saturation of 90% 404 minutes. Sleep study from 12/17/2020-BiPAP 10/7 controls sleep apnea adequately.         REVIEW OF SYSTEMS: 14 point ROS is negative beside mentioned in [place]. CONSTITUTIONAL SYMPTOMS: The patient denies fever, fatigue, night sweats, weight loss or weight gain. HEENT: No vision changes. No tinnitus, Denies sinus pain. No hoarseness, or dysphagia. NECK: Patient denies swelling in the neck. CARDIOVASCULAR: Denies chest pain, palpitation, syncope. RESPIRATORY: Denies shortness of breath or cough. GASTROINTESTINAL: Denies nausea, abdominal pain or change in bowel function. GENITOURINARY: Denies obstructive symptoms. No history of incontinence. BREASTS: No masses or lumps in the breasts. SKIN: No rashes or itching. MUSCULOSKELETAL: Denies weakness or bone pain. NEUROLOGICAL: No headaches or seizures. PSYCHIATRIC: Denies mood swings or depression. ENDOCRINE: Denies heat or cold intolerance or excessive thirst.  HEMATOLOGIC/LYMPHATIC: Denies easy bruising or lymph node swelling. ALLERGIC/IMMUNOLOGIC: No environmental allergies.     PAST MEDICAL HISTORY:   Past Medical History:   Diagnosis Date    Anxiety     Anxiety and depression     Arthritis     not sure of specific type    Asthma     CAD (coronary artery disease)     Cancer ([facility] 2007    left breast    Cerebral artery occlusion with cerebral infarction ([facility] ,     CH[facility]est[facility]     Chronic kidney disease     renal insufficency/to see Dr Mariusz Jean-Baptiste    Chronic pain     Constipation     COPD [facility]ch[facility] obstructive [facility] disease) ([facility]     Depression     Diabetes mellitus ([facility]     Diabetic polyneuropathy associated with type 2 diabetes mellitus ([facility] 2018    Dysthymia 2018    ESBL (extended spectrum beta-lactamase) producing bacteria infection 2018    urine    Fibromyalgia     Gastric ulcer, unspecified as acute or chronic, without mention of hemorrhage, perforation, or obstruction     GERD (gastroesophageal reflux disease)     Gout     HIGH CHOLESTEROL     Hypertension     Hypothyroidism     Severe persistent asthma without complication 8540    Thyroid disease     TIA (transient ischemic attack)     with occasional left leg and hand weakness       PAST SURGICAL HISTORY:   Past Surgical History:   Procedure Laterality Date    BREAST SURGERY      left mastectomy    CARPAL TUNNEL RELEASE      Bilateral    FINGER CONTRACTURE SURGERY      HYSTERECTOMY  2005    USO    TONSILLECTOMY      UPPER GASTROINTESTINAL ENDOSCOPY  2019    stretched esophagous        SOCIAL HISTORY:   Social History     Tobacco Use    Smoking status: Never Smoker    Smokeless tobacco: Never Used   Vaping Use    Vaping Use: Never used   Substance Use Topics    Alcohol use: No    Drug use: No       FAMILY HISTORY:   Family History   Problem Relation Age of Onset    Asthma Other     Cancer Other     Depression Other     Diabetes Other     Hypertension Other     High Cholesterol Other     Migraines Other     Heart Attack Father 72         of MI    High Blood Pressure Mother     Diabetes Mother        MEDICATIONS:     Current Outpatient Medications on File Prior to Visit   Medication Sig Dispense Refill    Urea 40 % LOTN Apply to feet nightly and cover with Aquaphor 325 mL 5    mineral oil-hydrophilic petrolatum (HYDROPHOR) ointment Apply topically as needed to hands and feet. 454 g 5    albuterol (PROVENTIL) (2.5 MG/3ML) 0.083% nebulizer solution Take 3 mLs by nebulization every 6 hours as needed for Wheezing or Shortness of Breath 120 each 1    simvastatin (ZOCOR) 20 MG tablet TAKE 1 TABLET BY MOUTH EVERY DAY AT NIGHT 90 tablet 2    metoprolol tartrate (LOPRESSOR) 25 MG tablet TAKE 1 TABLET BY MOUTH TWO TIMES A  tablet 2    ranolazine (RANEXA) 500 MG extended release tablet TAKE 1 TABLET BY MOUTH TWO TIMES A  tablet 0    omeprazole (PRILOSEC) 20 MG delayed release capsule TAKE 1 CAPSULE BY MOUTH TWO TIMES A DAY 60 capsule 0    SM STOOL SOFTENER/LAXATIVE 8.6-50 MG per tablet TAKE 2 TABLETS BY MOUTH TWO TIMES A  tablet 0    sulfaSALAzine (AZULFIDINE) 500 MG tablet TAKE 1 TABLET BY MOUTH TWO TIMES A DAY 60 tablet 0    FEROSUL 325 (65 Fe) MG tablet TAKE 1 TABLET BY MOUTH TWO TIMES A DAY WITH MEALS 180 tablet 0    STEGLATRO 5 MG TABS TAKE 1 TABLET BY MOUTH ONCE DAILY 90 tablet 1    butalbital-acetaminophen-caffeine (FIORICET, ESGIC) -40 MG per tablet TAKE 1 TABLET BY MOUTH EVERY SIX HOURS AS NEEDED FOR HEADACHE 30 tablet 3    insulin aspart (NOVOLOG) 100 UNIT/ML injection vial Use up to 30 units with each meal 3 each 3    insulin aspart (NOVOLOG FLEXPEN) 100 UNIT/ML injection pen INJECT 30 TO 50 UNITS INTO THE SKIN THREE TIMES DAILY BEFORE MEALS 20 pen 3    leflunomide (ARAVA) 20 MG tablet Take 1 tablet by mouth daily 30 tablet 2    spironolactone (ALDACTONE) 50 MG tablet TAKE 2 TABLETS BY MOUTH IN THE MORNING AND THEN TAKE 1 TABLET BY MOUTH IN THE EVENING 270 tablet 3    montelukast (SINGULAIR) 10 MG tablet Take 1 tablet by mouth nightly 30 tablet 5    blood glucose test strips (ONETOUCH VERIO) strip Test 5 times daily.  200 each 5    polyethylene glycol (GLYCOLAX) 17 GM/SCOOP powder TAKE 17 GRAMS (1 CAPFUL) BY MOUTH NIGHTLY 510 g 0    metOLazone (ZAROXOLYN) 5 MG tablet Take 7.5 mg by mouth Twice a Week Takes on Wed and Sunday only.  LINZESS 290 MCG CAPS capsule TAKE 1 CAPSULE BY MOUTH ONE TIME A DAY FOR 90 DAYS      REXULTI 1 MG TABS tablet TAKE 1 TABLET BY MOUTH EVERY NIGHT      Febuxostat 80 MG TABS Take 80 mg by mouth daily 30 tablet 1    levothyroxine (SYNTHROID) 150 MCG tablet TAKE 1 TABLET BY MOUTH IN THE MORNING (BEFORE BREAKFAST) 90 tablet 1    methocarbamol (ROBAXIN-750) 750 MG tablet Take 1 tablet by mouth 3 times daily as needed (pain and spasm) 30 tablet 0    nitroGLYCERIN (NITROSTAT) 0.4 MG SL tablet PLACE ONE TABLET UNDER TONGUE AS NEEDED FOR CHEST PAIN, MAY REPEAT EVERY 5 MINUTES AS NEEDED UP TO A MAX OF 3 TABLETS. IF NO RELIEF AFTER 1 DOSE, CALL 911. 25 tablet 0    Blood Glucose Monitoring Suppl (ONETOUCH VERIO) w/Device KIT Use to check glucose 4 times daily.  1 kit 0    torsemide (DEMADEX) 100 MG tablet TAKE 1 TABLET BY MOUTH IN THE MORNING AND 1/2 TABLET BY MOUTH IN THE EVENING 135 tablet 3    Insulin Pen Needle (B-D UF III MINI PEN NEEDLES) 31G X 5 MM MISC use five times daily with insulin 200 each 5    vitamin D3 (CHOLECALCIFEROL) 25 MCG (1000 UT) TABS tablet TAKE 3 TABLETS BY MOUTH ONE TIME A DAY 90 tablet 5    Insulin Degludec (TRESIBA FLEXTOUCH) 200 UNIT/ML SOPN inject 200 units subcutaneously once daily (Patient taking differently: 90 Units 2 times daily inject 200 units subcutaneously once daily) 20 pen 3    nystatin (MYCOSTATIN) 188667 UNIT/ML suspension Take 5 mLs by mouth 4 times daily 500 mL 1    Magic Mouthwash (MIRACLE MOUTHWASH) Swish and spit 5 mLs 4 times daily as needed for Irritation or Pain 300 mL 2    traZODone (DESYREL) 50 MG tablet Take 1 tablet by mouth nightly Take it on the day of sleep study 1 tablet 0    loratadine (CLARITIN) 10 MG tablet TAKE 1 TABLET BY MOUTH ONE TIME A DAY  30 tablet 5    lidocaine viscous hcl (XYLOCAINE) 2 % SOLN solution Take 5 mLs by mouth every 3 hours as needed for Irritation or Pain 100 mL 2    ketoconazole (NIZORAL) 2 % shampoo Apply topically daily as needed. 120 mL 1    diazePAM (VALIUM) 5 MG tablet Take 5 mg by mouth 2 times daily as needed for Anxiety.  cariprazine hcl (VRAYLAR) 1.5 MG capsule Take 3 mg by mouth daily       pregabalin (LYRICA) 50 MG capsule Take 50 mg by mouth 2 times daily.  OXYGEN Inhale 2 L into the lungs nightly Sometimes with activity      oxyCODONE (OXYCONTIN) 20 MG extended release tablet Take 20 mg by mouth every 12 hours.  aspirin 81 MG EC tablet Take 81 mg by mouth daily      benztropine (COGENTIN) 1 MG tablet Take 1 mg by mouth nightly       folic acid (FOLVITE) 1 MG tablet Take 1 mg by mouth daily      oxyCODONE-acetaminophen (PERCOCET)  MG per tablet Take 1 tablet by mouth every 4 hours as needed for Pain . Earliest Fill Date: 6/8/17 100 tablet 0    duloxetine (CYMBALTA) 60 MG capsule Take 60 mg by mouth 2 times daily. No current facility-administered medications on file prior to visit. ALLERGIES:   Allergies as of 02/10/2022 - Fully Reviewed 02/10/2022   Allergen Reaction Noted    Iv dye [iodides] Anaphylaxis 04/20/2011    Diazepam Other (See Comments) 04/05/2017    Insulin glargine Other (See Comments) 05/02/2018    Trazodone and nefazodone Other (See Comments) 09/01/2011      OBJECTIVE:   weight is 193 lb (87.5 kg). Her blood pressure is 117/70. Her oxygen saturation is 89% (abnormal). PHYSICAL EXAM:    CONSTITUTIONAL: She is a 45y.o.-year-old who appears her stated age. She is alert and oriented x 3 and in no acute distress. HEENT: PERRL. No scleral icterus. No thrush, atraumatic, normocephalic. NECK: Supple, without cervical or supraclavicular lymphadenopathy:  CARDIOVASCULAR: S1 S2 RRR. Without murmer  RESPIRATORY & CHEST: Lungs are clear to auscultation and percussion. No wheezing, no crackles.  Good air movement  GASTROINTESTINAL & ABDOMEN: Soft, nontender, positive bowel sounds in all quadrants, non-distended, without hepatosplenomegaly. GENITOURINARY: Deferred. MUSCULOSKELETAL: No tenderness to palpation of the axial skeleton. There is no clubbing. No cyanosis. No edema of the lower extremities. SKIN OF BODY: No rash or jaundice. PSYCHIATRIC EVALUATION: Normal affect. Patient answers questions appropriately. HEMATOLOGIC/LYMPHATIC/ IMMUNOLOGIC: No palpable lymphadenopathy. NEUROLOGIC: Alert and oriented x 3. Groslly non-focal. Motor strength is 5+/5 in all muscle groups. The patient has a normal sensorium globally. ASSESSMENT AND PLAN:     1. LUIS (obstructive sleep apnea)  Patient was diagnosed with obstructive sleep apnea many years ago. Shaun Azar, she could not get used to CPAP and stopped using it.    She underwent a repeat sleep study on 11/9/2020 that showed mild AHI of 5.8/h.  Patient was saturating less than 90% for 404 minutes. Unfortunately, patient did not have a adequate REM sleep. Breann Castaneda was underestimated.  Because of patient's cardiovascular risk factors including heart failure as well as obstructive sleep apnea symptoms including excessive daytime sleepiness and fatigue, CPAP titration study was recommended.    Patient underwent CPAP titration study on 12/17/2020 and BiPAP 10/7 was prescribed. I explained to her about the direct correlation of uncontrolled heart failure with repeated exacerbations and uncontrolled sleep apnea.  I explained to her the importance of controlling sleep apnea with CPAP machine. Patient stated that she cannot tolerate BiPAP. She is not using BiPAP.     2. Chronic respiratory failure with hypoxia (HCC)  Likely secondary to heart failure and uncontrolled sleep apnea. Donnie Letters might be some component of obesity hypoventilation syndrome.  Continue oxygen at 2 L/min.     3. Chronic heart failure with preserved ejection fraction ([address]  Managed by cardiology.  Continue diuretics     4.  Mild persistent asthma without complication  Continue Dulera 2 puffs twice daily.  Continue albuterol nebs q12.  Continue Singulair 10 mg daily.       Return in about 6 months (around 8/10/2022). Miguel Peña MD  Pulmonary Critical Care and Sleep Medicine  Electronically signed by Miguel Peña MD on 2/10/2022 at 2:25 PM     This note was completed using dragon medical speech recognition software. Grammatical errors, random word insertions, pronoun errors and incomplete sentences are occasional consequences of this technology due to software limitations. If there are questions or concerns about the content of this note of information contained within the body of this dictation they should be addressed with the provider for clarification.

## 2022-02-13 DIAGNOSIS — I50.30 (HFPEF) HEART FAILURE WITH PRESERVED EJECTION FRACTION (HCC): Chronic | ICD-10-CM

## 2022-02-13 DIAGNOSIS — I50.22 SYSTOLIC CHF, CHRONIC (HCC): ICD-10-CM

## 2022-02-13 DIAGNOSIS — R06.02 SOB (SHORTNESS OF BREATH): Chronic | ICD-10-CM

## 2022-02-13 DIAGNOSIS — I10 ESSENTIAL HYPERTENSION: ICD-10-CM

## 2022-02-13 DIAGNOSIS — N28.9 RENAL INSUFFICIENCY: Chronic | ICD-10-CM

## 2022-02-13 DIAGNOSIS — I25.10 CORONARY ARTERY DISEASE INVOLVING NATIVE CORONARY ARTERY OF NATIVE HEART WITHOUT ANGINA PECTORIS: Chronic | ICD-10-CM

## 2022-02-13 DIAGNOSIS — I25.83 CORONARY ARTERY DISEASE DUE TO LIPID RICH PLAQUE: Chronic | ICD-10-CM

## 2022-02-13 DIAGNOSIS — I25.10 CORONARY ARTERY DISEASE DUE TO LIPID RICH PLAQUE: Chronic | ICD-10-CM

## 2022-02-13 DIAGNOSIS — I42.9 CARDIOMYOPATHY, UNSPECIFIED TYPE (HCC): Chronic | ICD-10-CM

## 2022-02-14 ENCOUNTER — TELEPHONE (OUTPATIENT)
Dept: CARDIOLOGY CLINIC | Age: 52
End: 2022-02-14

## 2022-02-14 RX ORDER — SULFASALAZINE 500 MG/1
TABLET ORAL
Qty: 60 TABLET | Refills: 0 | OUTPATIENT
Start: 2022-02-14

## 2022-02-14 NOTE — TELEPHONE ENCOUNTER
----- Message from Huy Saeed MD sent at 2/11/2022  6:05 PM EST -----  Call patient. Her labs show that her fluid level is up. She should take an extra metolazone. No other changes.   Huy Saeed

## 2022-02-17 DIAGNOSIS — Z79.899 HIGH RISK MEDICATION USE: Primary | ICD-10-CM

## 2022-02-17 DIAGNOSIS — I50.30 (HFPEF) HEART FAILURE WITH PRESERVED EJECTION FRACTION (HCC): Chronic | ICD-10-CM

## 2022-02-17 DIAGNOSIS — I25.10 CORONARY ARTERY DISEASE INVOLVING NATIVE CORONARY ARTERY OF NATIVE HEART WITHOUT ANGINA PECTORIS: Chronic | ICD-10-CM

## 2022-02-17 DIAGNOSIS — N28.9 RENAL INSUFFICIENCY: Chronic | ICD-10-CM

## 2022-02-17 DIAGNOSIS — K21.9 HIATAL HERNIA WITH GERD: ICD-10-CM

## 2022-02-17 DIAGNOSIS — I10 ESSENTIAL HYPERTENSION: ICD-10-CM

## 2022-02-17 DIAGNOSIS — I50.22 SYSTOLIC CHF, CHRONIC (HCC): ICD-10-CM

## 2022-02-17 DIAGNOSIS — K44.9 HIATAL HERNIA WITH GERD: ICD-10-CM

## 2022-02-17 DIAGNOSIS — R06.02 SOB (SHORTNESS OF BREATH): Chronic | ICD-10-CM

## 2022-02-17 RX ORDER — OMEPRAZOLE 20 MG/1
CAPSULE, DELAYED RELEASE ORAL
Qty: 60 CAPSULE | Refills: 0 | OUTPATIENT
Start: 2022-02-17

## 2022-02-17 RX ORDER — METOLAZONE 2.5 MG/1
TABLET ORAL
Qty: 36 TABLET | Refills: 0 | OUTPATIENT
Start: 2022-02-17

## 2022-02-17 RX ORDER — FEBUXOSTAT 80 MG/1
TABLET, FILM COATED ORAL
Qty: 30 TABLET | Refills: 0 | OUTPATIENT
Start: 2022-02-17

## 2022-02-17 RX ORDER — METOLAZONE 2.5 MG/1
TABLET ORAL
Qty: 36 TABLET | Refills: 0 | Status: SHIPPED | OUTPATIENT
Start: 2022-02-17 | End: 2022-03-18

## 2022-02-17 RX ORDER — SIMVASTATIN 20 MG
TABLET ORAL
Qty: 90 TABLET | Refills: 0 | Status: SHIPPED | OUTPATIENT
Start: 2022-02-17 | End: 2022-06-21 | Stop reason: ALTCHOICE

## 2022-02-17 NOTE — TELEPHONE ENCOUNTER
Liver enzymes are still pending. I will order a new set of labs. Once I have everything back I will refill leflunomide.

## 2022-02-17 NOTE — TELEPHONE ENCOUNTER
Pt's  called upset with the office. They have called 4 times for refills and the office keeps denying the refills. They went to the lab and got the labs drawn. The lab told them they ольга all the orders that were available in the system. The orders drawn were ordered by . did not include liver function testing.  He advised that the lab will need new orders if she is required more labs     Needs refills on:    Leflunomide   Sulfasalazine 500 mg

## 2022-02-21 ENCOUNTER — TELEPHONE (OUTPATIENT)
Dept: RHEUMATOLOGY | Age: 52
End: 2022-02-21

## 2022-02-21 ENCOUNTER — HOSPITAL ENCOUNTER (OUTPATIENT)
Age: 52
Setting detail: SPECIMEN
Discharge: HOME | End: 2022-02-21
Payer: COMMERCIAL

## 2022-02-21 LAB
ALT SERPL-CCNC: 11 U/L (ref 10–40)
ANION GAP SERPL CALCULATED.3IONS-SCNC: 14 MMOL/L (ref 3–16)
AST SERPL-CCNC: 15 U/L (ref 15–37)
BUN BLDV-MCNC: 31 MG/DL (ref 7–20)
CALCIUM SERPL-MCNC: 9.7 MG/DL (ref 8.3–10.6)
CHLORIDE BLD-SCNC: 92 MMOL/L (ref 99–110)
CO2: 29 MMOL/L (ref 21–32)
CREAT SERPL-MCNC: 1.3 MG/DL (ref 0.6–1.1)
GFR AFRICAN AMERICAN: 52
GFR NON-AFRICAN AMERICAN: 43
GLUCOSE BLD-MCNC: 137 MG/DL (ref 70–99)
POTASSIUM SERPL-SCNC: 4.6 MMOL/L (ref 3.5–5.1)
PRO-BNP: 182 PG/ML (ref 0–124)
SODIUM BLD-SCNC: 135 MMOL/L (ref 136–145)

## 2022-02-21 PROCEDURE — 84450 TRANSFERASE (AST) (SGOT): CPT

## 2022-02-21 PROCEDURE — 83880 ASSAY OF NATRIURETIC PEPTIDE: CPT

## 2022-02-21 PROCEDURE — 80048 BASIC METABOLIC PNL TOTAL CA: CPT

## 2022-02-21 PROCEDURE — 84460 ALANINE AMINO (ALT) (SGPT): CPT

## 2022-02-21 PROCEDURE — 36415 COLL VENOUS BLD VENIPUNCTURE: CPT

## 2022-02-21 NOTE — TELEPHONE ENCOUNTER
called advising that the office has been denying refills for patient because she needs labs done and appt. . She is scheduled for the first available appt. However, they went to the lab to get the blood work done and they done everything the MD wanted. Advised that the Liver enzymes were not drawn. There is a nurse with them and will draw them for . however, they will come over with cardiology's name on them. Being drawn today. Will need to watch for them to result. ----- Message from Zechariah Knowles LPN sent at 9/26/2017  4:41 PM CDT -----  Contact: pt  Pt would like an appt to see Dr. Geiger for a WWE at the [facility] if possible. I tried to schedule an appt there for the pt for a date after 10/6 but was not able to do so.

## 2022-02-22 RX ORDER — LEFLUNOMIDE 20 MG/1
20 TABLET ORAL DAILY
Qty: 30 TABLET | Refills: 2 | Status: SHIPPED | OUTPATIENT
Start: 2022-02-22 | End: 2022-05-17

## 2022-02-24 ENCOUNTER — TELEPHONE (OUTPATIENT)
Dept: INTERNAL MEDICINE CLINIC | Age: 52
End: 2022-02-24

## 2022-02-24 DIAGNOSIS — K44.9 HIATAL HERNIA WITH GERD: ICD-10-CM

## 2022-02-24 DIAGNOSIS — K21.9 HIATAL HERNIA WITH GERD: ICD-10-CM

## 2022-02-24 RX ORDER — FEBUXOSTAT 80 MG/1
80 TABLET, FILM COATED ORAL DAILY
Qty: 90 TABLET | Refills: 1 | Status: SHIPPED | OUTPATIENT
Start: 2022-02-24 | End: 2022-06-14 | Stop reason: SDUPTHER

## 2022-02-24 RX ORDER — OMEPRAZOLE 20 MG/1
CAPSULE, DELAYED RELEASE ORAL
Qty: 60 CAPSULE | Refills: 0 | OUTPATIENT
Start: 2022-02-24

## 2022-02-24 RX ORDER — SULFASALAZINE 500 MG/1
TABLET ORAL
Qty: 180 TABLET | Refills: 0 | Status: SHIPPED | OUTPATIENT
Start: 2022-02-24 | End: 2022-05-17

## 2022-02-25 ENCOUNTER — TELEPHONE (OUTPATIENT)
Dept: ADMINISTRATIVE | Age: 52
End: 2022-02-25

## 2022-02-28 ENCOUNTER — TELEPHONE (OUTPATIENT)
Dept: CARDIOLOGY CLINIC | Age: 52
End: 2022-02-28

## 2022-02-28 ENCOUNTER — TELEPHONE (OUTPATIENT)
Dept: INTERNAL MEDICINE CLINIC | Age: 52
End: 2022-02-28

## 2022-02-28 DIAGNOSIS — R51.9 WORSENING HEADACHES: Primary | ICD-10-CM

## 2022-02-28 NOTE — TELEPHONE ENCOUNTER
----- Message from Klaus Robles MD sent at 2/27/2022 10:14 PM EST -----  Call patient. Labs look great. No changes.   ARETHA

## 2022-02-28 NOTE — TELEPHONE ENCOUNTER
She does not need a CT as she had an MRI of her brain in July that was normal. If her headaches have changed since then, will order MRI.

## 2022-02-28 NOTE — TELEPHONE ENCOUNTER
Spoke with pt's  and she is having chronic headaches. They are seeing neurology in July. His is requesting a ct scan for the pt to make sure nothing is seriously wrong. Meryle Sandman

## 2022-03-03 ENCOUNTER — TELEPHONE (OUTPATIENT)
Dept: INTERNAL MEDICINE CLINIC | Age: 52
End: 2022-03-03

## 2022-03-03 DIAGNOSIS — K44.9 HIATAL HERNIA WITH GERD: ICD-10-CM

## 2022-03-03 DIAGNOSIS — K21.9 HIATAL HERNIA WITH GERD: ICD-10-CM

## 2022-03-03 RX ORDER — OMEPRAZOLE 20 MG/1
CAPSULE, DELAYED RELEASE ORAL
Qty: 60 CAPSULE | Refills: 5 | Status: SHIPPED | OUTPATIENT
Start: 2022-03-03 | End: 2022-08-12

## 2022-03-03 NOTE — TELEPHONE ENCOUNTER
Stephen calling---still have not received the Omeprazole script from 2/14---can you please resend to them. Thanks. Marina Ends on [facility]. Thanks.

## 2022-03-08 ENCOUNTER — HOSPITAL ENCOUNTER (OUTPATIENT)
Age: 52
Setting detail: SPECIMEN
Discharge: HOME | End: 2022-03-08
Payer: COMMERCIAL

## 2022-03-08 LAB
ALT SERPL-CCNC: 12 U/L (ref 10–40)
ANION GAP SERPL CALCULATED.3IONS-SCNC: 15 MMOL/L (ref 3–16)
AST SERPL-CCNC: 17 U/L (ref 15–37)
BASOPHILS ABSOLUTE: 0.1 K/UL (ref 0–0.2)
BASOPHILS RELATIVE PERCENT: 0.6 %
BUN BLDV-MCNC: 53 MG/DL (ref 7–20)
CALCIUM SERPL-MCNC: 10 MG/DL (ref 8.3–10.6)
CHLORIDE BLD-SCNC: 93 MMOL/L (ref 99–110)
CO2: 26 MMOL/L (ref 21–32)
CREAT SERPL-MCNC: 1.5 MG/DL (ref 0.6–1.1)
EOSINOPHILS ABSOLUTE: 0.2 K/UL (ref 0–0.6)
EOSINOPHILS RELATIVE PERCENT: 1.5 %
GFR AFRICAN AMERICAN: 44
GFR NON-AFRICAN AMERICAN: 36
GLUCOSE BLD-MCNC: 72 MG/DL (ref 70–99)
HCT VFR BLD CALC: 37.1 % (ref 36–48)
HEMOGLOBIN: 12.1 G/DL (ref 12–16)
LYMPHOCYTES ABSOLUTE: 3.1 K/UL (ref 1–5.1)
LYMPHOCYTES RELATIVE PERCENT: 27.4 %
MCH RBC QN AUTO: 28.3 PG (ref 26–34)
MCHC RBC AUTO-ENTMCNC: 32.5 G/DL (ref 31–36)
MCV RBC AUTO: 87 FL (ref 80–100)
MONOCYTES ABSOLUTE: 0.7 K/UL (ref 0–1.3)
MONOCYTES RELATIVE PERCENT: 6.2 %
NEUTROPHILS ABSOLUTE: 7.4 K/UL (ref 1.7–7.7)
NEUTROPHILS RELATIVE PERCENT: 64.3 %
PDW BLD-RTO: 13.1 % (ref 12.4–15.4)
PLATELET # BLD: 284 K/UL (ref 135–450)
PMV BLD AUTO: 9.8 FL (ref 5–10.5)
POTASSIUM SERPL-SCNC: 3.9 MMOL/L (ref 3.5–5.1)
PRO-BNP: 166 PG/ML (ref 0–124)
RBC # BLD: 4.27 M/UL (ref 4–5.2)
SODIUM BLD-SCNC: 134 MMOL/L (ref 136–145)
WBC # BLD: 11.5 K/UL (ref 4–11)

## 2022-03-08 PROCEDURE — 80048 BASIC METABOLIC PNL TOTAL CA: CPT

## 2022-03-08 PROCEDURE — 85025 COMPLETE CBC W/AUTO DIFF WBC: CPT

## 2022-03-08 PROCEDURE — 84460 ALANINE AMINO (ALT) (SGPT): CPT

## 2022-03-08 PROCEDURE — 84450 TRANSFERASE (AST) (SGOT): CPT

## 2022-03-08 PROCEDURE — 83880 ASSAY OF NATRIURETIC PEPTIDE: CPT

## 2022-03-08 PROCEDURE — 36415 COLL VENOUS BLD VENIPUNCTURE: CPT

## 2022-03-09 ENCOUNTER — TELEPHONE (OUTPATIENT)
Dept: CARDIOLOGY CLINIC | Age: 52
End: 2022-03-09

## 2022-03-09 NOTE — TELEPHONE ENCOUNTER
JASWANTN calling back-Wt # [phone] today  #[phone] on 3/5/22. Gave Torsemide instructions per TIFFANY Del Angel.

## 2022-03-09 NOTE — TELEPHONE ENCOUNTER
CHF Screening Questionaire     2. Are you SOB? Yes      3. How long has this SOB been going on? last few days    4. Do you weigh yourself daily? Yes (patients should weigh themselves daily, at the same time of day, wearing the same amount of clothing, first thing in the morning after urinating - please encourage them to continue with daily weights with or without symptoms)     5. Has your weight gone up in the past 2 weeks? Yes *If yes, how much?   5 pounds, called [facility] to get current weight LMOM for her to call back     5. What is your weight in the past 5 days? he is not at home     6. Do you have any swelling in your feet or legs? Yes      7. Is your abdomen bloated? Yes     8. Can you lie flat at night to sleep? No    9. Are you waking up at night because you can't breathe? No      10. Have you missed any medications especially diuretics? No Torsemide 100 mg Am and   50 mg in the PM monitoring fluid intake also     11. Have you been eating out more or had a recent increase in salty foods? No      12. Any chest discomfort, dizziness or lightheadedness?  Yes

## 2022-03-09 NOTE — TELEPHONE ENCOUNTER
Covering as Dr Estrellita Jacinto is out of the office  Ask her to take an extra 50 mg of torsemide one time

## 2022-03-11 ENCOUNTER — TELEPHONE (OUTPATIENT)
Dept: INTERNAL MEDICINE CLINIC | Age: 52
End: 2022-03-11

## 2022-03-11 DIAGNOSIS — D50.9 IRON DEFICIENCY ANEMIA, UNSPECIFIED IRON DEFICIENCY ANEMIA TYPE: ICD-10-CM

## 2022-03-11 RX ORDER — FERROUS SULFATE 325(65) MG
TABLET ORAL
Qty: 180 TABLET | Refills: 0 | Status: SHIPPED | OUTPATIENT
Start: 2022-03-11 | End: 2022-06-13

## 2022-03-11 NOTE — TELEPHONE ENCOUNTER
[facility] is calling regarding order she is faxing for Dr Fadia Garg to sign. She states she received orders back yesterday saying they should go to cardiology. She is faxing again today and would like a call back at [phone].

## 2022-03-14 ENCOUNTER — OFFICE VISIT (OUTPATIENT)
Dept: RHEUMATOLOGY | Age: 52
End: 2022-03-14
Payer: COMMERCIAL

## 2022-03-14 VITALS
BODY MASS INDEX: 38.28 KG/M2 | WEIGHT: 196 LBS | SYSTOLIC BLOOD PRESSURE: 128 MMHG | DIASTOLIC BLOOD PRESSURE: 82 MMHG | HEART RATE: 63 BPM

## 2022-03-14 DIAGNOSIS — M1A.09X0 IDIOPATHIC CHRONIC GOUT OF MULTIPLE SITES WITHOUT TOPHUS: ICD-10-CM

## 2022-03-14 DIAGNOSIS — M06.00 SERONEGATIVE RHEUMATOID ARTHRITIS (HCC): Primary | ICD-10-CM

## 2022-03-14 DIAGNOSIS — R76.8 POSITIVE ANA (ANTINUCLEAR ANTIBODY): ICD-10-CM

## 2022-03-14 DIAGNOSIS — Z79.899 HIGH RISK MEDICATION USE: ICD-10-CM

## 2022-03-14 DIAGNOSIS — M51.36 DDD (DEGENERATIVE DISC DISEASE), LUMBAR: ICD-10-CM

## 2022-03-14 PROCEDURE — 1036F TOBACCO NON-USER: CPT | Performed by: INTERNAL MEDICINE

## 2022-03-14 PROCEDURE — 3017F COLORECTAL CA SCREEN DOC REV: CPT | Performed by: INTERNAL MEDICINE

## 2022-03-14 PROCEDURE — G8482 FLU IMMUNIZE ORDER/ADMIN: HCPCS | Performed by: INTERNAL MEDICINE

## 2022-03-14 PROCEDURE — G8417 CALC BMI ABV UP PARAM F/U: HCPCS | Performed by: INTERNAL MEDICINE

## 2022-03-14 PROCEDURE — 99214 OFFICE O/P EST MOD 30 MIN: CPT | Performed by: INTERNAL MEDICINE

## 2022-03-14 PROCEDURE — G8427 DOCREV CUR MEDS BY ELIG CLIN: HCPCS | Performed by: INTERNAL MEDICINE

## 2022-03-14 RX ORDER — LEFLUNOMIDE 10 MG/1
TABLET ORAL
COMMUNITY
Start: 2021-12-16 | End: 2022-03-14

## 2022-03-14 RX ORDER — METHOCARBAMOL 500 MG/1
TABLET, FILM COATED ORAL
COMMUNITY
Start: 2021-12-17 | End: 2022-03-14

## 2022-03-14 RX ORDER — PREGABALIN 75 MG/1
CAPSULE ORAL
Qty: 60 CAPSULE | Refills: 2 | Status: SHIPPED | OUTPATIENT
Start: 2022-03-14 | End: 2022-06-14 | Stop reason: SDUPTHER

## 2022-03-14 NOTE — PROGRESS NOTES
3/14/2022  Patient Name: Marzena Meredith  : 1970  Medical Record: [id number]      ASSESSMENT AND PLAN    Assessment/Plan:      ASSESSMENT:    1. Seronegative rheumatoid arthritis ([facility] [address]    2. Idiopathic chronic gout of multiple sites without tophus    3. High risk medication use    4. Positive BRENNA (antinuclear antibody)    5. DDD (degenerative disc disease), lumbar        PLAN:     Crow was seen today for follow-up. Diagnoses and all orders for this visit:    Seronegative rheumatoid arthritis ([address]    Idiopathic chronic gout of multiple sites without tophus    High risk medication use    Positive BRENNA (antinuclear antibody)    DDD (degenerative disc disease), lumbar  -     pregabalin (LYRICA) 75 MG capsule; Take 1 cap twice a day    Seronegative rheumatoid arthritis-diagnosed more than 10 years ago. RF, CCP negative. HLA-B27, hepatitis panel negative. Hand x-rays with osteoarthritis/degenerative changes  Continues to be symptomatic  I did not appreciate any synovitis on joint exam  Hand swelling most likely multifactorial [fluid retention, diabetes, medication side effects]  She will continue sulfasalazine 500 mg twice a day   Continue leflunomide 20 mg daily  Previous history of methotrexate [hair loss] and Plaquenil [loss of efficacy] use. Gout-no recent flareups of gout. Uric acid at goal.  Continue Uloric 80 mg daily       Degenerative disc disease in the lumbar and cervical spine-   Lumba  and cervical spine with multilevel degenerative changes   advised to continue follow-up with a spine specialist/pain specialist.  Follows pain specialist.    I will increase Lyrica to 75 mg twice a day  Percocet and oxycodone prescribed by pain specialist    Low titer positive BRENNA-1: 80 speckled pattern  Implications of low titer positive BRENNA were discussed with patient. About 15-20% of normal healthy individuals at her age may have low titer positive BRENNA of unclear clinical significance.   dsDNA, anti-Piedra, RNP, SSA/SSB is negative      The patient indicates understanding of these issues and agrees with the plan. Return in about 3 months (around 6/14/2022). The risks and benefits of my recommendations, as well as other treatment options, benefits and side effects werediscussed with the patient. All questions were answered. MEDICATIONS  Current Outpatient Medications   Medication Sig Dispense Refill    pregabalin (LYRICA) 75 MG capsule Take 1 cap twice a day 60 capsule 2    FEROSUL 325 (65 Fe) MG tablet TAKE 1 TABLET BY MOUTH TWO TIMES A DAY WITH MEALS 180 tablet 0    omeprazole (PRILOSEC) 20 MG delayed release capsule TAKE 1 CAPSULE BY MOUTH TWO TIMES A DAY 60 capsule 5    Febuxostat 80 MG TABS Take 80 mg by mouth daily 90 tablet 1    sulfaSALAzine (AZULFIDINE) 500 MG tablet TAKE 1 TABLET BY MOUTH TWO TIMES A  tablet 0    leflunomide (ARAVA) 20 MG tablet Take 1 tablet by mouth daily 30 tablet 2    metOLazone (ZAROXOLYN) 2.5 MG tablet TAKE 1 TABLET BY MOUTH TWO TIMES A WEEK AS NEEDED FOR WEIGHT OVER 180 POUNDS. DO NOT TAKE 2 DAYS IN A ROW. 36 tablet 0    simvastatin (ZOCOR) 20 MG tablet TAKE 1 TABLET BY MOUTH EVERY DAY AT NIGHT 90 tablet 0    metoprolol tartrate (LOPRESSOR) 25 MG tablet TAKE 1 TABLET BY MOUTH TWO TIMES A  tablet 0    TRESIBA FLEXTOUCH 200 UNIT/ML SOPN INJECT 200 UNITS SUBCUTANEOUSLY ONCE DAILY 20 pen 1    levothyroxine (SYNTHROID) 150 MCG tablet TAKE 1 TABLET BY MOUTH IN THE MORNING (BEFORE BREAKFAST) 90 tablet 0    AMITIZA 24 MCG capsule TAKE 1 CAPSULE BY MOUTH TWO TIMES A DAY WITH MEALS 60 capsule 0    Urea 40 % LOTN Apply to feet nightly and cover with Aquaphor 325 mL 5    mineral oil-hydrophilic petrolatum (HYDROPHOR) ointment Apply topically as needed to hands and feet.  454 g 5    albuterol (PROVENTIL) (2.5 MG/3ML) 0.083% nebulizer solution Take 3 mLs by nebulization every 6 hours as needed for Wheezing or Shortness of Breath 120 each 1    ranolazine (RANEXA) 500 MG extended release tablet TAKE 1 TABLET BY MOUTH TWO TIMES A  tablet 0    SM STOOL SOFTENER/LAXATIVE 8.6-50 MG per tablet TAKE 2 TABLETS BY MOUTH TWO TIMES A  tablet 0    STEGLATRO 5 MG TABS TAKE 1 TABLET BY MOUTH ONCE DAILY 90 tablet 1    butalbital-acetaminophen-caffeine (FIORICET, ESGIC) -40 MG per tablet TAKE 1 TABLET BY MOUTH EVERY SIX HOURS AS NEEDED FOR HEADACHE 30 tablet 3    insulin aspart (NOVOLOG) 100 UNIT/ML injection vial Use up to 30 units with each meal 3 each 3    insulin aspart (NOVOLOG FLEXPEN) 100 UNIT/ML injection pen INJECT 30 TO 50 UNITS INTO THE SKIN THREE TIMES DAILY BEFORE MEALS 20 pen 3    spironolactone (ALDACTONE) 50 MG tablet TAKE 2 TABLETS BY MOUTH IN THE MORNING AND THEN TAKE 1 TABLET BY MOUTH IN THE EVENING 270 tablet 3    montelukast (SINGULAIR) 10 MG tablet Take 1 tablet by mouth nightly 30 tablet 5    blood glucose test strips (ONETOUCH VERIO) strip Test 5 times daily. 200 each 5    polyethylene glycol (GLYCOLAX) 17 GM/SCOOP powder TAKE 17 GRAMS (1 CAPFUL) BY MOUTH NIGHTLY 510 g 0    metOLazone (ZAROXOLYN) 5 MG tablet Take 7.5 mg by mouth Twice a Week Takes on Wed and Sunday only.  LINZESS 290 MCG CAPS capsule TAKE 1 CAPSULE BY MOUTH ONE TIME A DAY FOR 90 DAYS      REXULTI 1 MG TABS tablet TAKE 1 TABLET BY MOUTH EVERY NIGHT      methocarbamol (ROBAXIN-750) 750 MG tablet Take 1 tablet by mouth 3 times daily as needed (pain and spasm) 30 tablet 0    nitroGLYCERIN (NITROSTAT) 0.4 MG SL tablet PLACE ONE TABLET UNDER TONGUE AS NEEDED FOR CHEST PAIN, MAY REPEAT EVERY 5 MINUTES AS NEEDED UP TO A MAX OF 3 TABLETS. IF NO RELIEF AFTER 1 DOSE, CALL 911. 25 tablet 0    Blood Glucose Monitoring Suppl (ONETOUCH VERIO) w/Device KIT Use to check glucose 4 times daily.  1 kit 0    torsemide (DEMADEX) 100 MG tablet TAKE 1 TABLET BY MOUTH IN THE MORNING AND 1/2 TABLET BY MOUTH IN THE EVENING 135 tablet 3    Insulin Pen Needle (B-D UF III MINI PEN NEEDLES) 31G X 5 MM MISC use five times daily with insulin 200 each 5    vitamin D3 (CHOLECALCIFEROL) 25 MCG (1000 UT) TABS tablet TAKE 3 TABLETS BY MOUTH ONE TIME A DAY 90 tablet 5    nystatin (MYCOSTATIN) 547834 UNIT/ML suspension Take 5 mLs by mouth 4 times daily 500 mL 1    Magic Mouthwash (MIRACLE MOUTHWASH) Swish and spit 5 mLs 4 times daily as needed for Irritation or Pain 300 mL 2    traZODone (DESYREL) 50 MG tablet Take 1 tablet by mouth nightly Take it on the day of sleep study 1 tablet 0    loratadine (CLARITIN) 10 MG tablet TAKE 1 TABLET BY MOUTH ONE TIME A DAY  30 tablet 5    lidocaine viscous hcl (XYLOCAINE) 2 % SOLN solution Take 5 mLs by mouth every 3 hours as needed for Irritation or Pain 100 mL 2    ketoconazole (NIZORAL) 2 % shampoo Apply topically daily as needed. 120 mL 1    diazePAM (VALIUM) 5 MG tablet Take 5 mg by mouth 2 times daily as needed for Anxiety.  cariprazine hcl (VRAYLAR) 1.5 MG capsule Take 3 mg by mouth daily       OXYGEN Inhale 2 L into the lungs nightly Sometimes with activity      oxyCODONE (OXYCONTIN) 20 MG extended release tablet Take 20 mg by mouth every 12 hours.  aspirin 81 MG EC tablet Take 81 mg by mouth daily      benztropine (COGENTIN) 1 MG tablet Take 1 mg by mouth nightly       folic acid (FOLVITE) 1 MG tablet Take 1 mg by mouth daily      oxyCODONE-acetaminophen (PERCOCET)  MG per tablet Take 1 tablet by mouth every 4 hours as needed for Pain . Earliest Fill Date: 6/8/17 100 tablet 0    duloxetine (CYMBALTA) 60 MG capsule Take 60 mg by mouth 2 times daily. No current facility-administered medications for this visit.        ALLERGIES  Allergies   Allergen Reactions    Iv Dye [Iodides] Anaphylaxis     allergic to ct scan dye, not the MRI    Diazepam Other (See Comments)    Insulin Glargine Other (See Comments)     High blood sugar     Trazodone And Nefazodone Other (See Comments)     Makes patient feel very sluggish         Comments  No specialty comments available. Background history:  Nilay English is a 45 y.o. female with past medical history of hypertension, diabetes, congestive heart failure, COPD, depression, hypothyroidism, chronic kidney disease, degenerative disc disease in the lumbar spine, breast cancer status post chemoradiation, seronegative rheumatoid arthritis, gout who is being seen for follow up evaluation of  seronegative rheumatoid arthritis and gout. She was seeing Dr. Ariane Rick  at [facility].  She was last seen in January 2021. She was diagnosed with seronegative rheumatoid arthritis several years ago. She was on methotrexate and Plaquenil in the past which was stopped due to lack of efficacy. She is now on leflunomide 10 mg daily and sulfasalazine 500 mg twice a day. She was on sulfasalazine 1000 mg twice a day which was slowly tapered down to 500 mg twice a day since it was not helping much. She continues to have pain in the joints especially with weather changes. She has swelling in the hands. She has stiffness in the joints that can last all day long. She denies psoriasis, inflammatory bowel disease, inflammatory back pain, dactylitis, enthesitis, tenosynovitis or uveitis. She denies fever, weight loss or swollen glands. She denies family history of rheumatoid arthritis. She has degenerative disc disease in the lumbar and cervical spine. She has low chronic low back pain. Back pain gets worse with activity and improves with rest.  Back pain radiates to the left lower extremity. She denies bowel or bladder dysfunction, saddle anesthesia. She sees a pain specialist and receives epidural spinal injections. She also has a gout which was diagnosed 2 years ago. She is on Uloric 80 mg daily and colchicine 0.6 mg daily. She is unable to go off of colchicine due to frequent flareups.     She has lymphedema of the left arm after she underwent breast surgery with lymph node resection and chemoradiation. She has been on tamoxifen for several years. She is off of tamoxifen for past 2 years. She has not had any recurrence of breast cancer. Data reviewed: Reviewed notes by Dr. Dallas Villarreal from January 2021 as mentioned in HPI. Chronic rheumatoid arthritis, gout, lymphedema, CKD. Taper of sulfasalazine and continue Arava 10 mg daily. Continue Uloric 80 mg daily. Interim history: She presents for follow-up of seronegative rheumatoid arthritis, osteoarthritis, gout, chronic pain. She is on leflunomide 20 mg daily and sulfasalazine 500 mg twice a day. She continues complain of pain, swelling and stiffness in the joints. Symptoms get worse with weather changes. She has swelling in the hands. Stiffness can last all day long. She also has chronic neck and low back pain. She sees a pain specialist.  She has degenerative disc disease in the lumbar and cervical spine. She has received multiple epidural spinal injections in the past.  She also has gout. She has not had any new flareup of gout since last seen. She is on Uloric 80 mg daily. Uric acid is at goal.  She denies any side effects with leflunomide and sulfasalazine. She denies any recent fevers or infections. HPI  Review of Systems    REVIEW OF SYSTEMS: Positive for shortness of breath, wheezing, renal disease,, anxiety, depression, neuropathies, paresthesias  Constitutional: No unanticipated weight loss or fevers. Integumentary: No rash, photosensitivity, malar rash, livedo reticularis, alopecia and Raynaud's symptoms, sclerodactyly, skin tightening  Eyes: negative for visual disturbance and persistent redness, discharge from eyes   ENT: - No tinnitus, loss of hearing, vertigo, or recurrent ear infections.  - No history of nasal/oral ulcers.   - No history of dry eyes/dry mouth  Cardiovascular: No history of pericarditis, chest pain or murmur or palpitations  Respiratory: No cough or history of interstitial lung disease. No history of pleurisy. No history of tuberculosis or atypical infections. Gastrointestinal: No history of heart burn, dysphagia or esophageal dysmotility. No change in bowel habits or any inflammatory bowel disease. Genitourinary: No history of miscarriages. Hematologic/Lymphatic: No abnormal bruising or bleeding, blood clots or swollen lymph nodes. Neurological: No history of headaches, seizure or focal weakness. No history of  hyperesthesias, facial droop, diplopia  Psychiatric: No history of bipolar disease  Endocrine: Denies any polyuria, polydipsia and osteoporosis  Allergic/Immunologic: No nasal congestion or hives. I have reviewed patients Past medical History, Social History and Family History as mentioned in her chart and this remains unchanged fromprevious.     Past Medical History:   Diagnosis Date    Anxiety     Anxiety and depression     Arthritis     not sure of specific type    Asthma     CAD (coronary artery disease)     Cancer ([facility] [address] 2007    left breast    Cerebral artery occlusion with cerebral infarction ([facility] [address]     2000, 2001    CHF (congestive heart failure) ([facility] [address] 2018    Chronic kidney disease     renal insufficency/to see Dr Gio Alberto    Chronic pain     Constipation     COPD (chronic obstructive pulmonary disease) ([address]     Depression     Diabetes mellitus ([address]     Diabetic polyneuropathy associated with type 2 diabetes mellitus ([facility] [address] 2/2/2018    Dysthymia 2/2/2018    ESBL (extended spectrum beta-lactamase) producing bacteria infection 03/14/2018    urine    Fibromyalgia     Gastric ulcer, unspecified as acute or chronic, without mention of hemorrhage, perforation, or obstruction     GERD (gastroesophageal reflux disease)     Gout     HIGH CHOLESTEROL     Hypertension     Hypothyroidism     Severe persistent asthma without complication 1/7/2010    Thyroid disease     TIA (transient ischemic attack)     with occasional left leg and hand Abused: Not on file   Housing Stability:     Unable to Pay for Housing in the Last Year: Not on file    Number of Places Lived in the Last Year: Not on file    Unstable Housing in the Last Year: Not on file     Family History   Problem Relation Age of Onset    Asthma Other     Cancer Other     Depression Other     Diabetes Other     Hypertension Other     High Cholesterol Other     Migraines Other     Heart Attack Father 72         of MI    High Blood Pressure Mother     Diabetes Mother          PHYSICAL EXAM   Vitals:    22 1447   BP: 128/82   Pulse: 63   Weight: 196 lb (88.9 kg)     Physical Exam  Constitutional:  Well developed, well nourished, no acute distress, non-toxic appearance   Musculoskeletal:    Ambulates without assistance, normal gait  Neck: Decreased range of motion, tenderness to palpation +  RIGHT  Swell  Tender  ROM  LEFT  Swell  Tender  ROM    DIP2  0  0   [place]  0  0   [place]   DIP3  0  0   [place]  0  0   [place]   DIP4  0  0   [place]  0  0   [place]   DIP5  0  0   [place]  0  0   [place]   PIP1  0  0  FULL   0  0  FULL    PIP2  0 0 FULL   0  0  FULL    PIP3  0 0 FULL   0  0  FULL    PIP4  0 0 FULL   0  0  FULL    PIP5  0 0 FULL   0  0  FULL    MCP1  0 0 FULL   0  0  FULL    MCP2  0 0 FULL   0  0  FULL    MCP3  0 0 FULL   0  0  FULL    MCP4  0 0 FULL   0  0  FULL    MCP5  0 0 FULL   0  0  FULL    Wrist  0  0  FULL   0  0  FULL    Elbow  0  0  FULL   0  0  FULL    Shouldr  0  0  decr  0  0  decr   Hip  0  0  decr  0  0  decr   Knee  0  0   crepitus  0  0   crepitus   Ankle  0  0  FULL   0  0  FULL    MTP1  0  0  FULL   0  0  FULL    MTP2  0  0  FULL   0  0  FULL    MTP3  0  0  FULL   0  0  FULL    MTP4  0  0  FULL   0  0  FULL    MTP5  0  0  FULL   0  0  FULL    IP1  0  0  FULL   0  0  FULL    IP2  0  0  FULL   0  0  FULL    IP3  0  0  FULL   0  0  FULL    IP4  0  0  FULL   0  0  FULL    IP5  0  0  FULL   0 0 FULL     Lymphedema of the left hand K 4.4 10/29/2020 0429    CL 93 (L) 03/08/2022 1529    CO2 26 03/08/2022 1529    BUN 53 (H) 03/08/2022 1529    CREATININE 1.5 (H) 03/08/2022 1529        Component Value Date/Time    CALCIUM 10.0 03/08/2022 1529    ALKPHOS 137 (H) 08/17/2021 1310    AST 17 03/08/2022 1529    ALT 12 03/08/2022 1529    BILITOT <0.2 08/17/2021 1310          Lab Results   Component Value Date    SEDRATE 93 (H) 08/17/2021     Lab Results   Component Value Date    CRP 8.0 (H) 08/17/2021     Lab Results   Component Value Date    BRENNA Negative 07/08/2010     Lab Results   Component Value Date    RF <10.0 08/10/2021     Lab Results   Component Value Date    BRENNA Negative 07/08/2010    ANATITER 1:80 08/10/2021     No results found for: DSDNAG, DSDNAIGGIFA  No results found for: SSAROAB, SSALAAB  No results found for: SMAB, RNPAB  No results found for: CENTABIGG  No results found for: C3, C4, ACE  Lab Results   Component Value Date    VITD25 46.6 05/30/2019     Lab Results   Component Value Date    LABURIC 5.3 08/17/2021     Lab Results   Component Value Date    IONCOKRN37 396 01/29/2020     Lab Results   Component Value Date    TSHFT4 0.45 08/10/2021    TSH 0.91 10/29/2020     Lab Results   Component Value Date    VITD25 46.6 05/30/2019     Bilateral hand, lumbar, cervical spine, SI joint x-rays 11/2/2021  Cervical spine: Mild endplate degenerative changes.  Minimal retrolisthesis   of C2 on C3 and anterolisthesis of C4 on C5.       Lumbar spine: Mild multilevel endplate degenerative changes.       SI joints: No radiographic evidence of sacroiliitis.  Mild bilateral SI joint   and hip joint degenerative changes.       Hands: Significant soft tissue swelling of the left hand and wrist.       Degenerative changes at multiple IP joints, severe at the left index finger   DIP joint with a questionable central erosion increasing suspicion for   erosive osteoarthritis.       Mild right 1st CMC joint degenerative changes.     ######################################################################    I thank you for giving me theopportunity to participate in [facility] care. If you have any questions or concerns please feel free to contact me. I look forward to following  [place]row along with you. Electronically signed by: Italo De La Paz MD, MD, 3/14/2022 3:06 PM    Documentation was done using voice recognition dragon software. Every effort was made to ensure accuracy;however, inadvertent unintentional computerized transcription errors may be present.

## 2022-03-14 NOTE — PATIENT INSTRUCTIONS
Labs every 3 months   Increase lyrica to 75 mg twice a day   Continue arava, sulfasalazine and uloric

## 2022-03-15 ENCOUNTER — TELEPHONE (OUTPATIENT)
Dept: ENDOCRINOLOGY | Age: 52
End: 2022-03-15

## 2022-03-15 ENCOUNTER — TELEPHONE (OUTPATIENT)
Dept: INTERNAL MEDICINE CLINIC | Age: 52
End: 2022-03-15

## 2022-03-15 ENCOUNTER — OFFICE VISIT (OUTPATIENT)
Dept: ENDOCRINOLOGY | Age: 52
End: 2022-03-15
Payer: COMMERCIAL

## 2022-03-15 VITALS
HEART RATE: 71 BPM | BODY MASS INDEX: 33.57 KG/M2 | SYSTOLIC BLOOD PRESSURE: 107 MMHG | WEIGHT: 196.6 LBS | DIASTOLIC BLOOD PRESSURE: 65 MMHG | HEIGHT: 64 IN | RESPIRATION RATE: 16 BRPM

## 2022-03-15 DIAGNOSIS — N28.9 RENAL INSUFFICIENCY: Chronic | ICD-10-CM

## 2022-03-15 DIAGNOSIS — I25.10 CORONARY ARTERY DISEASE INVOLVING NATIVE CORONARY ARTERY OF NATIVE HEART WITHOUT ANGINA PECTORIS: Chronic | ICD-10-CM

## 2022-03-15 DIAGNOSIS — I10 ESSENTIAL HYPERTENSION: ICD-10-CM

## 2022-03-15 DIAGNOSIS — R06.02 SOB (SHORTNESS OF BREATH): Chronic | ICD-10-CM

## 2022-03-15 DIAGNOSIS — E06.3 HYPOTHYROIDISM DUE TO HASHIMOTO'S THYROIDITIS: ICD-10-CM

## 2022-03-15 DIAGNOSIS — I50.22 SYSTOLIC CHF, CHRONIC (HCC): ICD-10-CM

## 2022-03-15 DIAGNOSIS — I50.30 (HFPEF) HEART FAILURE WITH PRESERVED EJECTION FRACTION (HCC): Chronic | ICD-10-CM

## 2022-03-15 DIAGNOSIS — E03.8 HYPOTHYROIDISM DUE TO HASHIMOTO'S THYROIDITIS: ICD-10-CM

## 2022-03-15 LAB — HBA1C MFR BLD: 7.8 %

## 2022-03-15 PROCEDURE — 99214 OFFICE O/P EST MOD 30 MIN: CPT | Performed by: INTERNAL MEDICINE

## 2022-03-15 PROCEDURE — 3017F COLORECTAL CA SCREEN DOC REV: CPT | Performed by: INTERNAL MEDICINE

## 2022-03-15 PROCEDURE — G8417 CALC BMI ABV UP PARAM F/U: HCPCS | Performed by: INTERNAL MEDICINE

## 2022-03-15 PROCEDURE — 83036 HEMOGLOBIN GLYCOSYLATED A1C: CPT | Performed by: INTERNAL MEDICINE

## 2022-03-15 PROCEDURE — 1036F TOBACCO NON-USER: CPT | Performed by: INTERNAL MEDICINE

## 2022-03-15 PROCEDURE — 2022F DILAT RTA XM EVC RTNOPTHY: CPT | Performed by: INTERNAL MEDICINE

## 2022-03-15 PROCEDURE — G8427 DOCREV CUR MEDS BY ELIG CLIN: HCPCS | Performed by: INTERNAL MEDICINE

## 2022-03-15 PROCEDURE — G8482 FLU IMMUNIZE ORDER/ADMIN: HCPCS | Performed by: INTERNAL MEDICINE

## 2022-03-15 PROCEDURE — 3051F HG A1C>EQUAL 7.0%<8.0%: CPT | Performed by: INTERNAL MEDICINE

## 2022-03-15 RX ORDER — FLASH GLUCOSE SCANNING READER
EACH MISCELLANEOUS
Qty: 1 EACH | Refills: 0 | Status: SHIPPED | OUTPATIENT
Start: 2022-03-15

## 2022-03-15 RX ORDER — MONTELUKAST SODIUM 10 MG/1
10 TABLET ORAL NIGHTLY
Qty: 30 TABLET | Refills: 0 | Status: SHIPPED | OUTPATIENT
Start: 2022-03-15 | End: 2022-04-13

## 2022-03-15 RX ORDER — FLASH GLUCOSE SENSOR
KIT MISCELLANEOUS
Qty: 2 EACH | Refills: 5 | Status: SHIPPED | OUTPATIENT
Start: 2022-03-15 | End: 2022-07-27 | Stop reason: SDUPTHER

## 2022-03-15 RX ORDER — LUBIPROSTONE 24 UG/1
CAPSULE, GELATIN COATED ORAL
Qty: 60 CAPSULE | Refills: 0 | Status: SHIPPED | OUTPATIENT
Start: 2022-03-15 | End: 2022-03-22

## 2022-03-15 NOTE — PROGRESS NOTES
Raegan Morales is a 47 y.o. female is seen for the management of uncontrolled  type 2 diabetes and hypothyroidism . Patient has complex medical problems including coronary artery disease , DOMENIC, CHF, breast cancer, fibromyalgia, hyperlipidemia, hypertension, obesity, asthma and depression  T2DM which is uncontrolled and is complicated with nephropathy and DOMENIC . She got diabetic education in the past and at one point she was on an insulin pump. She still has hypoglycemia awareness tends to watch her diet somewhat but her depression hinders in managing her diabetes. Most of her medications are managed by her . Rose Serrato was diagnosed with hypothyroidism in march 2010 and has been on Lt4 since then   she had GDM with her 2 kids ---she has been on insulin for years      Rose Serrato also has hypertension , GERD , hyperlipidemia ,she also has breast cancer s/p mastectomy and radiation and chemo &is on Tamixifen   she also has Asthma she is on Cpap for sleep apnea and wears Oxygen   She follows with a rheumatologist and is on hydroxychloroquine, previously was on methotrexate    INTERIM:    Diabetes  She presents for her follow-up diabetic visit. She has type 2 diabetes mellitus. No MedicAlert identification noted. The initial diagnosis of diabetes was made 17 years ago. Her disease course has been stable. Hypoglycemia symptoms include nervousness/anxiousness and sweats. Pertinent negatives for hypoglycemia include no dizziness or headaches. Associated symptoms include fatigue and weakness. There are no hypoglycemic complications. Symptoms are stable. Diabetic complications include autonomic neuropathy, heart disease and peripheral neuropathy. Risk factors for coronary artery disease include diabetes mellitus, post-menopausal, hypertension and dyslipidemia. Current diabetic treatment includes intensive insulin program. She is compliant with treatment some of the time. Her weight is stable.  She is following a generally healthy diet. Meal planning includes avoidance of concentrated sweets. She has had a previous visit with a dietitian. She rarely participates in exercise. There is no change in her home blood glucose trend. Her breakfast blood glucose is taken between 8-9 am. Her breakfast blood glucose range is generally 140-180 mg/dl. Her lunch blood glucose is taken between 12-1 pm. Her lunch blood glucose range is generally 140-180 mg/dl. Her dinner blood glucose is taken between 7-8 pm. Her dinner blood glucose range is generally >200 mg/dl. An ACE inhibitor/angiotensin II receptor blocker is being taken. She sees a podiatrist.Eye exam is current. She follows with pain management at [facility]   Patient continues to have chronic swelling in her extremities specifically in the right arm since her breast cancer surgery. No worsening noted. She is closely monitored by cardiology and is on torsemide and gets weekly renal panel and BNP. She denies any significant hypoglycemia since last visit, still frustrated about fluctuation in glucose despite patient not eating anything there could be wide fluctuations from 150-400 according to the  he does admit that the patient is under a lot of stress and probably some of the medications she takes could contribute to high glucose, she is not taking any steroids. Her fingerstick blood glucose running high     Weight trend: stable  Prior visit with dietician: yes  Current diet: on average, 3 meals per day  Current exercise: rare    Has there been any hospitalization, surgery or major illness since the last visit?  Yes   Has there been any new school, family or social problems since the last visit?  no   Has there been any new family history of members with diabetes, heart disease, stroke, or endocrine related problems since the last visit?  no    Past Medical History:   Diagnosis Date    Anxiety     Anxiety and depression     Arthritis     not sure of specific type    Asthma     CAD (coronary artery disease)     Cancer ([facility] 2007    left breast    Cerebral artery occlusion with cerebral infarction ([facility]     2000, 2001    CHF (congestive heart failure) ([facility] 2018    Chronic kidney disease     renal insufficency/to see Dr Vesna Alvares    Chronic pain     Constipation     COPD (chronic obstructive pulmonary disease) ([facility]     Depression     Diabetes mellitus ([facility]     Diabetic polyneuropathy associated with type 2 diabetes mellitus ([facility] 2/2/2018    Dysthymia 2/2/2018    ESBL (extended spectrum beta-lactamase) producing bacteria infection 03/14/2018    urine    Fibromyalgia     Gastric ulcer, unspecified as acute or chronic, without mention of hemorrhage, perforation, or obstruction     GERD (gastroesophageal reflux disease)     Gout     HIGH CHOLESTEROL     Hypertension     Hypothyroidism     Severe persistent asthma without complication 6/2/8767    Thyroid disease     TIA (transient ischemic attack)     with occasional left leg and hand weakness      Patient Active Problem List   Diagnosis    Fibromyalgia    Iron deficiency anemia    History of breast cancer    Chronic pain    Lymphedema of upper extremity following lymphadenectomy    Encounter for monitoring aromatase inhibitor therapy    Encounter for follow-up surveillance of breast cancer    Hyperlipidemia LDL goal <70    Essential hypertension    Poorly controlled type 2 diabetes mellitus ([address]    Asthma    Hypothyroidism    Anxiety and depression    Hx of breast cancer    Hypoxia    SOB (shortness of breath)    Hiatal hernia with GERD    Obstructive sleep apnea syndrome    Coronary artery disease involving native coronary artery of native heart without angina pectoris    Severe persistent asthma without complication    Diabetic polyneuropathy associated with type 2 diabetes mellitus ([facility]    Chronic congestive heart failure (HCC)    Chronic heart failure with used   Substance and Sexual Activity    Alcohol use: No    Drug use: No    Sexual activity: Not Currently   Other Topics Concern    Not on file   Social History Narrative    Not on file     Social Determinants of Health     Financial Resource Strain: Low Risk     Difficulty of Paying Living Expenses: Not hard at all   Food Insecurity: No Food Insecurity    Worried About Running Out of Food in the Last Year: Never true    Linda of Food in the Last Year: Never true   Transportation Needs:     Lack of Transportation (Medical): Not on file    Lack of Transportation (Non-Medical):  Not on file   Physical Activity:     Days of Exercise per Week: Not on file    Minutes of Exercise per Session: Not on file   Stress:     Feeling of Stress : Not on file   Social Connections:     Frequency of Communication with Friends and Family: Not on file    Frequency of Social Gatherings with Friends and Family: Not on file    Attends Jain Services: Not on file    Active Member of [address] [place] [address] or Organizations: Not on file    Attends Club or Organization Meetings: Not on file    Marital Status: Not on file   Intimate Partner Violence:     Fear of Current or Ex-Partner: Not on file    Emotionally Abused: Not on file    Physically Abused: Not on file    Sexually Abused: Not on file   Housing Stability:     Unable to Pay for Housing in the Last Year: Not on file    Number of [place] in the Last Year: Not on file    Unstable Housing in the Last Year: Not on file     Family History   Problem Relation Age of Onset    Asthma Other     Cancer Other     Depression Other     Diabetes Other     Hypertension Other     High Cholesterol Other     Migraines Other     Heart Attack Father 72         of MI    High Blood Pressure Mother     Diabetes Mother      Current Outpatient Medications   Medication Sig Dispense Refill    montelukast (SINGULAIR) 10 MG tablet take 1 tablet by mouth nightly 30 tablet 0    AMITIZA 24 MCG capsule TAKE 1 CAPSULE BY MOUTH TWO TIMES A DAY WITH MEALS 60 capsule 0    Continuous Blood Gluc Sensor (FREESTYLE EMERALD 2 SENSOR) MISC Change every 14 days 2 each 5    Continuous Blood Gluc  (FREESTYLE EMERALD 2 READER) MINDY To check glucose levels 1 each 0    pregabalin (LYRICA) 75 MG capsule Take 1 cap twice a day 60 capsule 2    FEROSUL 325 (65 Fe) MG tablet TAKE 1 TABLET BY MOUTH TWO TIMES A DAY WITH MEALS 180 tablet 0    omeprazole (PRILOSEC) 20 MG delayed release capsule TAKE 1 CAPSULE BY MOUTH TWO TIMES A DAY 60 capsule 5    Febuxostat 80 MG TABS Take 80 mg by mouth daily 90 tablet 1    sulfaSALAzine (AZULFIDINE) 500 MG tablet TAKE 1 TABLET BY MOUTH TWO TIMES A  tablet 0    leflunomide (ARAVA) 20 MG tablet Take 1 tablet by mouth daily 30 tablet 2    metOLazone (ZAROXOLYN) 2.5 MG tablet TAKE 1 TABLET BY MOUTH TWO TIMES A WEEK AS NEEDED FOR WEIGHT OVER 180 POUNDS. DO NOT TAKE 2 DAYS IN A ROW. 36 tablet 0    simvastatin (ZOCOR) 20 MG tablet TAKE 1 TABLET BY MOUTH EVERY DAY AT NIGHT 90 tablet 0    metoprolol tartrate (LOPRESSOR) 25 MG tablet TAKE 1 TABLET BY MOUTH TWO TIMES A  tablet 0    TRESIBA FLEXTOUCH 200 UNIT/ML SOPN INJECT 200 UNITS SUBCUTANEOUSLY ONCE DAILY 20 pen 1    levothyroxine (SYNTHROID) 150 MCG tablet TAKE 1 TABLET BY MOUTH IN THE MORNING (BEFORE BREAKFAST) 90 tablet 0    Urea 40 % LOTN Apply to feet nightly and cover with Aquaphor 325 mL 5    mineral oil-hydrophilic petrolatum (HYDROPHOR) ointment Apply topically as needed to hands and feet.  454 g 5    albuterol (PROVENTIL) (2.5 MG/3ML) 0.083% nebulizer solution Take 3 mLs by nebulization every 6 hours as needed for Wheezing or Shortness of Breath 120 each 1    ranolazine (RANEXA) 500 MG extended release tablet TAKE 1 TABLET BY MOUTH TWO TIMES A  tablet 0    SM STOOL SOFTENER/LAXATIVE 8.6-50 MG per tablet TAKE 2 TABLETS BY MOUTH TWO TIMES A  tablet 0    STEGLATRO 5 MG TABS TAKE 1 TABLET BY MOUTH ONCE DAILY 90 tablet 1    butalbital-acetaminophen-caffeine (FIORICET, ESGIC) -40 MG per tablet TAKE 1 TABLET BY MOUTH EVERY SIX HOURS AS NEEDED FOR HEADACHE 30 tablet 3    insulin aspart (NOVOLOG) 100 UNIT/ML injection vial Use up to 30 units with each meal 3 each 3    insulin aspart (NOVOLOG FLEXPEN) 100 UNIT/ML injection pen INJECT 30 TO 50 UNITS INTO THE SKIN THREE TIMES DAILY BEFORE MEALS 20 pen 3    spironolactone (ALDACTONE) 50 MG tablet TAKE 2 TABLETS BY MOUTH IN THE MORNING AND THEN TAKE 1 TABLET BY MOUTH IN THE EVENING 270 tablet 3    blood glucose test strips (ONETOUCH VERIO) strip Test 5 times daily. 200 each 5    polyethylene glycol (GLYCOLAX) 17 GM/SCOOP powder TAKE 17 GRAMS (1 CAPFUL) BY MOUTH NIGHTLY 510 g 0    metOLazone (ZAROXOLYN) 5 MG tablet Take 7.5 mg by mouth Twice a Week Takes on Wed and Sunday only.  LINZESS 290 MCG CAPS capsule TAKE 1 CAPSULE BY MOUTH ONE TIME A DAY FOR 90 DAYS      REXULTI 1 MG TABS tablet TAKE 1 TABLET BY MOUTH EVERY NIGHT      methocarbamol (ROBAXIN-750) 750 MG tablet Take 1 tablet by mouth 3 times daily as needed (pain and spasm) 30 tablet 0    nitroGLYCERIN (NITROSTAT) 0.4 MG SL tablet PLACE ONE TABLET UNDER TONGUE AS NEEDED FOR CHEST PAIN, MAY REPEAT EVERY 5 MINUTES AS NEEDED UP TO A MAX OF 3 TABLETS. IF NO RELIEF AFTER 1 DOSE, CALL 911. 25 tablet 0    Blood Glucose Monitoring Suppl (ONETOUCH VERIO) w/Device KIT Use to check glucose 4 times daily.  1 kit 0    torsemide (DEMADEX) 100 MG tablet TAKE 1 TABLET BY MOUTH IN THE MORNING AND 1/2 TABLET BY MOUTH IN THE EVENING 135 tablet 3    Insulin Pen Needle (B-D UF III MINI PEN NEEDLES) 31G X 5 MM MISC use five times daily with insulin 200 each 5    vitamin D3 (CHOLECALCIFEROL) 25 MCG (1000 UT) TABS tablet TAKE 3 TABLETS BY MOUTH ONE TIME A DAY 90 tablet 5    nystatin (MYCOSTATIN) 846507 UNIT/ML suspension Take 5 mLs by mouth 4 times daily 500 mL 1    Magic Mouthwash (MIRACLE MOUTHWASH) Swish and spit 5 mLs 4 times daily as needed for Irritation or Pain 300 mL 2    traZODone (DESYREL) 50 MG tablet Take 1 tablet by mouth nightly Take it on the day of sleep study 1 tablet 0    loratadine (CLARITIN) 10 MG tablet TAKE 1 TABLET BY MOUTH ONE TIME A DAY  30 tablet 5    lidocaine viscous hcl (XYLOCAINE) 2 % SOLN solution Take 5 mLs by mouth every 3 hours as needed for Irritation or Pain 100 mL 2    ketoconazole (NIZORAL) 2 % shampoo Apply topically daily as needed. 120 mL 1    diazePAM (VALIUM) 5 MG tablet Take 5 mg by mouth 2 times daily as needed for Anxiety.  cariprazine hcl (VRAYLAR) 1.5 MG capsule Take 3 mg by mouth daily       OXYGEN Inhale 2 L into the lungs nightly Sometimes with activity      oxyCODONE (OXYCONTIN) 20 MG extended release tablet Take 20 mg by mouth every 12 hours.  aspirin 81 MG EC tablet Take 81 mg by mouth daily      benztropine (COGENTIN) 1 MG tablet Take 1 mg by mouth nightly       folic acid (FOLVITE) 1 MG tablet Take 1 mg by mouth daily      oxyCODONE-acetaminophen (PERCOCET)  MG per tablet Take 1 tablet by mouth every 4 hours as needed for Pain . Earliest Fill Date: 17 100 tablet 0    duloxetine (CYMBALTA) 60 MG capsule Take 60 mg by mouth 2 times daily. No current facility-administered medications for this visit. Allergies   Allergen Reactions    Iv Dye [Iodides] Anaphylaxis     allergic to ct scan dye, not the MRI    Diazepam Other (See Comments)    Insulin Glargine Other (See Comments)     High blood sugar     Trazodone And Nefazodone Other (See Comments)     Makes patient feel very sluggish     Family Status   Relation Name Status    Other family h/o Alive    Father      Mother  Alive    Sister  Alive    Brother  Alive   .     OBJECTIVE:  /65   Pulse 71   Resp 16   Ht 5' 4\" (1.626 m)   Wt 196 lb 9.6 oz (89.2 kg)   BMI 33.75 kg/m²    Wt Readings from Last 3 Encounters:   03/15/22 196 lb 9.6 oz (89.2 kg)   03/14/22 196 lb (88.9 kg)   02/10/22 193 lb (87.5 kg)     Constitutional: no acute distress, well appearing, well nourished  Psychiatric: oriented to person, place and time, judgement, insight and normal, recent and remote memory and intact and mood, affect are normal  Skin: skin and subcutaneous tissue is normal without mass,   Head and Face: examination of head and face revealed no abnormalities  Eyes: no lid or conjunctival swelling, no erythema or discharge, pupils are normal,   Ears/Nose: external inspection of ears and nose revealed no abnormalities, hearing is grossly normal  Oropharynx/Mouth/Face: lips, tongue and gums are normal with no lesions, the voice quality was normal  Neck: neck is supple and symmetric, with midline trachea and no masses, thyroid is normal    Pulmonary: no increased work of breathing or signs of respiratory distress, lungs are clear to auscultation  Cardiovascular: normal heart rate and rhythm, normal S1 and S2,   Musculoskeletal: normal gait and station,   Neurological: normal coordination, normal general cortical function    ROS  I have reviewed the review of system questionnaire filled by the patient .   Patient was advised to contact PCP for non endocrine signs and symptoms       Lab Results   Component Value Date    LABA1C 7.8 03/15/2022    LABA1C 8.6 02/02/2021    LABA1C 8.4 01/26/2021         ASSESSMENT/PLAN:    ---- Type 2 diabetes mellitus with  complication, with long-term current use of insulin also has DAN and gastroparesis --UNCONTROLLED 8.5>>7.3>>7.8>>8.4  Control has improved 7.8 slightly encouraged her to continue working on her compliance with insulin therapy  She still has erratic sleeping and eating habits and tends not to exercise regularly    [facility] dose will be increased to 160 units at bedtime, discussed continuous glucose sensor as well  ---we discussed U 500   --Send prescription for freestyle madai  According to  fingerstick blood glucose are fluctuating between 145--300   --- Patient tends to take 30-50 units of Humalog with the meals and bases it on her blood sugar log and the amount of food   ---VGO not approved   -- on Steglatro as [place] was not approved with her insurance  She was cautioned about dehydration associated with Steglatro on top of her other diuretics she gets weekly blood work which is managed by cardiology. --- Due to her hypoxemia and MARIA D I stopped the metformin In January 2018  --- Glucotrol XL stopped  --- due to worsening creatine     ---Her depression has been hindering in her optimal glycemic control --- patient tells me that she eat only one meal a day and that also has minimal carbs ---advised to counts her snack at all she tends to snack on dates and fruits which both can lead to elevated blood glucose she typically does not take any short acting insulin for the snacks she was advised to check her fingerstick more often  We had a lengthy discussion about these issues ---she is reliant on her  to help with diabetes care   we had a lengthy discussion about glycmeic goals and treatment options   Hypoglycemia protocol reviewed in detail with patient Patient was advised to carry glucose tablets and also have glucagon emergency kit. Provided with RX for glucagon.      Health maintenance  -advised to have annual dilated eye exam uptodate pos retinopathy follows with Dr Kelsea Godinez last visit in 2019  -last microalb/creat ratio elevated at >800 will recheck unable to calculate she was advised to follow with nephrology she previously saw Dr. Nichole Rose  Currently not seeing a nephrologist for over a year today we discussed that    foot exam --- she saw a podiatrist who gave her steroid shot she goes for regular follow up with her --last visit in March 2021    --- Previously episodes of hot flashes  She is noted to have [address] level less than 0.1, FSH level of 2.2, plasma metanephrines in the normal range in December 2019  Her IGF-I level was elevated I will repeat with the next lab    --- Hypothyroidism    she is on 150 mcg daily continue on same dose   ----she had thyroid hemiagensis left lobe absent   --She has been issues with swallowing --- had a CT scan of neck done which revealed normal thyroid normal submandibular and normal architecture.      CKD stage 3  EGFR 34  Advised to follow-up with nephrology    --- Mixed hyperlipidemia  On statins   Last TG high and LDL was 115 in April 2018   Encouraged to work on diet and reduce the amount of fat in her diet as well as work on carbohydrate restriction      --- Hx of breast cancer  Follows with onc   On tamoxifen diet 3 modification which apparently will be stopped in 2019    ---Primary fibromyalgia  On Lyrica follows with Dr Carlos Barlow     --Malignant neoplasm of overlapping sites of left female breast, unspecified estrogen receptor status ([facility]  Breast cancer s/p surgery and chemo 2008   She is on tamoxifen which according to pt gives her pain in her arms and hence she takes percocet     --- Essential hypertension  Controlled now   Continue with current regimen    Patient denies any chest pain any shortness of breath    ----Coronary artery disease involving native coronary artery of native heart without angina pectoris  Stable follows with Dr Son Arshad     --- Lymphedema of upper extremity following lymphadenectomy  Stable     ---- Dysthymia  Depression   She is on Latuda    Her dose has been adjusted   She appears to be alert and oriented to day but was very tearful    ---Asthma without complication  Stable    ---Diabetic polyneuropathy associated with type 2 diabetes mellitus ([facility]  Stable  She is on Lyrica and was on Cymbalta in the past       ---Rh Arthritis   She is on methotrexate and Arava  Plaquenil is also in her med list     ---?Kat Cespedes   Follows with cardiology continues to have multiple admission due to worsening of congestive heart failureshe is on demadex , sprinoloactone

## 2022-03-15 NOTE — TELEPHONE ENCOUNTER
Mr Guaman Ba states [place] [id number] sent in paperwork for Dr Dona Briggs to sign for filling patient medication at home. He is asking that office complete paperwork and return to [place] [id number].

## 2022-03-16 ENCOUNTER — TELEPHONE (OUTPATIENT)
Dept: ENDOCRINOLOGY | Age: 52
End: 2022-03-16

## 2022-03-17 DIAGNOSIS — E55.9 VITAMIN D DEFICIENCY: Primary | ICD-10-CM

## 2022-03-17 NOTE — TELEPHONE ENCOUNTER
PA approved:    Crow Bonner Lopez: [id number] - PA Case ID: [id number]  Outcome  Approved today  Approved.

## 2022-03-17 NOTE — TELEPHONE ENCOUNTER
PA approved:    Crow Bonner Key: [id number] - PA Case ID: [id number]  Outcome  Approved today  Approved.

## 2022-03-18 ENCOUNTER — TELEPHONE (OUTPATIENT)
Dept: ENDOCRINOLOGY | Age: 52
End: 2022-03-18

## 2022-03-18 RX ORDER — METOLAZONE 2.5 MG/1
TABLET ORAL
Qty: 24 TABLET | Refills: 0 | Status: SHIPPED | OUTPATIENT
Start: 2022-03-18

## 2022-03-18 NOTE — TELEPHONE ENCOUNTER
RX APPROVAL:  Last OV  8/9/21; plan, and labs reviewed    Last labs 3/8/22    Recent changes in diuretics

## 2022-03-22 ENCOUNTER — TELEPHONE (OUTPATIENT)
Dept: ENDOCRINOLOGY | Age: 52
End: 2022-03-22

## 2022-03-22 ENCOUNTER — HOSPITAL ENCOUNTER (OUTPATIENT)
Age: 52
Setting detail: SPECIMEN
Discharge: HOME | End: 2022-03-22
Payer: COMMERCIAL

## 2022-03-22 LAB
ANION GAP SERPL CALCULATED.3IONS-SCNC: 14 MMOL/L (ref 3–16)
BUN BLDV-MCNC: 34 MG/DL (ref 7–20)
CALCIUM SERPL-MCNC: 9.8 MG/DL (ref 8.3–10.6)
CHLORIDE BLD-SCNC: 95 MMOL/L (ref 99–110)
CO2: 29 MMOL/L (ref 21–32)
CREAT SERPL-MCNC: 1.4 MG/DL (ref 0.6–1.1)
GFR AFRICAN AMERICAN: 48
GFR NON-AFRICAN AMERICAN: 39
GLUCOSE BLD-MCNC: 156 MG/DL (ref 70–99)
POTASSIUM SERPL-SCNC: 4.5 MMOL/L (ref 3.5–5.1)
PRO-BNP: 173 PG/ML (ref 0–124)
SODIUM BLD-SCNC: 138 MMOL/L (ref 136–145)

## 2022-03-22 PROCEDURE — 84443 ASSAY THYROID STIM HORMONE: CPT

## 2022-03-22 PROCEDURE — 80048 BASIC METABOLIC PNL TOTAL CA: CPT

## 2022-03-22 PROCEDURE — 83880 ASSAY OF NATRIURETIC PEPTIDE: CPT

## 2022-03-22 PROCEDURE — 36415 COLL VENOUS BLD VENIPUNCTURE: CPT

## 2022-03-22 RX ORDER — LUBIPROSTONE 24 UG/1
CAPSULE, GELATIN COATED ORAL
Qty: 60 CAPSULE | Refills: 5 | Status: SHIPPED | OUTPATIENT
Start: 2022-03-22 | End: 2022-04-14

## 2022-03-22 RX ORDER — RANOLAZINE 500 MG/1
TABLET, EXTENDED RELEASE ORAL
Qty: 180 TABLET | Refills: 3 | Status: SHIPPED | OUTPATIENT
Start: 2022-03-22 | End: 2022-06-06

## 2022-03-22 NOTE — TELEPHONE ENCOUNTER
Duplicate?     Forms from [facility] placed in nurse bin

## 2022-03-23 LAB — TSH SERPL DL<=0.05 MIU/L-ACNC: 2.12 UIU/ML (ref 0.27–4.2)

## 2022-03-30 ENCOUNTER — TELEPHONE (OUTPATIENT)
Dept: CARDIOLOGY CLINIC | Age: 52
End: 2022-03-30

## 2022-03-30 NOTE — TELEPHONE ENCOUNTER
----- Message from Thai Carlin MD sent at 3/28/2022  9:42 PM EDT -----  Call patient. Labs look okay. No changes.   ARETHA
Let's just see what happens over the next week on current therapy.   ARETHA
OK.
Spoke to pt. She is feeling good and talkative today. Wt:  185-186 at home last week. This week 194-196. She is not eating sausage or theodore or ham.  Has some belly and leg swelling.  states he is trying to take his wife to a female urologist.  Assisted with finding number and names of female doctors in [facility]. He states that sometimes when she urinates, she just goes a few drops of urine.
no

## 2022-04-05 ENCOUNTER — TELEPHONE (OUTPATIENT)
Dept: INTERNAL MEDICINE CLINIC | Age: 52
End: 2022-04-05

## 2022-04-05 ENCOUNTER — HOSPITAL ENCOUNTER (OUTPATIENT)
Age: 52
Setting detail: SPECIMEN
Discharge: HOME | End: 2022-04-05
Payer: COMMERCIAL

## 2022-04-05 LAB
ANION GAP SERPL CALCULATED.3IONS-SCNC: 14 MMOL/L (ref 3–16)
BUN BLDV-MCNC: 37 MG/DL (ref 7–20)
CALCIUM SERPL-MCNC: 9.2 MG/DL (ref 8.3–10.6)
CHLORIDE BLD-SCNC: 92 MMOL/L (ref 99–110)
CO2: 28 MMOL/L (ref 21–32)
CREAT SERPL-MCNC: 1.4 MG/DL (ref 0.6–1.1)
GFR AFRICAN AMERICAN: 48
GFR NON-AFRICAN AMERICAN: 39
GLUCOSE BLD-MCNC: 410 MG/DL (ref 70–99)
POTASSIUM SERPL-SCNC: 4.4 MMOL/L (ref 3.5–5.1)
PRO-BNP: 291 PG/ML (ref 0–124)
SODIUM BLD-SCNC: 134 MMOL/L (ref 136–145)

## 2022-04-05 PROCEDURE — 83880 ASSAY OF NATRIURETIC PEPTIDE: CPT

## 2022-04-05 PROCEDURE — 36415 COLL VENOUS BLD VENIPUNCTURE: CPT

## 2022-04-05 PROCEDURE — 80048 BASIC METABOLIC PNL TOTAL CA: CPT

## 2022-04-05 NOTE — TELEPHONE ENCOUNTER
Rolf Lebron from [address] is calling asking for the plan of care from 3/3 be signed and returned. Thanks.

## 2022-04-13 RX ORDER — MONTELUKAST SODIUM 10 MG/1
10 TABLET ORAL NIGHTLY
Qty: 30 TABLET | Refills: 0 | Status: SHIPPED | OUTPATIENT
Start: 2022-04-13 | End: 2022-04-14

## 2022-04-14 RX ORDER — LUBIPROSTONE 24 UG/1
CAPSULE, GELATIN COATED ORAL
Qty: 60 CAPSULE | Refills: 0 | Status: SHIPPED | OUTPATIENT
Start: 2022-04-14 | End: 2022-04-21

## 2022-04-14 RX ORDER — SENNOSIDES AND DOCUSATE SODIUM 8.6; 5 MG/1; MG/1
TABLET, FILM COATED ORAL
Qty: 360 TABLET | Refills: 0 | Status: SHIPPED | OUTPATIENT
Start: 2022-04-14 | End: 2022-05-16

## 2022-04-14 RX ORDER — INSULIN ASPART 100 [IU]/ML
INJECTION, SOLUTION INTRAVENOUS; SUBCUTANEOUS
Qty: 60 ML | Refills: 3 | Status: SHIPPED | OUTPATIENT
Start: 2022-04-14 | End: 2022-07-27 | Stop reason: SDUPTHER

## 2022-04-14 RX ORDER — MONTELUKAST SODIUM 10 MG/1
10 TABLET ORAL NIGHTLY
Qty: 30 TABLET | Refills: 0 | Status: SHIPPED | OUTPATIENT
Start: 2022-04-14 | End: 2022-05-09

## 2022-04-14 RX ORDER — BLOOD SUGAR DIAGNOSTIC
STRIP MISCELLANEOUS
Qty: 200 STRIP | Refills: 5 | Status: SHIPPED | OUTPATIENT
Start: 2022-04-14

## 2022-04-14 RX ORDER — BLOOD SUGAR DIAGNOSTIC
STRIP MISCELLANEOUS
Qty: 200 STRIP | Refills: 5 | Status: SHIPPED | OUTPATIENT
Start: 2022-04-14 | End: 2022-04-14

## 2022-04-19 ENCOUNTER — HOSPITAL ENCOUNTER (OUTPATIENT)
Age: 52
Discharge: HOME | End: 2022-04-19
Payer: COMMERCIAL

## 2022-04-19 LAB
ANION GAP SERPL CALCULATED.3IONS-SCNC: 13 MMOL/L (ref 3–16)
BUN BLDV-MCNC: 39 MG/DL (ref 7–20)
CALCIUM SERPL-MCNC: 9.4 MG/DL (ref 8.3–10.6)
CHLORIDE BLD-SCNC: 96 MMOL/L (ref 99–110)
CO2: 28 MMOL/L (ref 21–32)
CREAT SERPL-MCNC: 1.6 MG/DL (ref 0.6–1.1)
GFR AFRICAN AMERICAN: 41
GFR NON-AFRICAN AMERICAN: 34
GLUCOSE BLD-MCNC: 131 MG/DL (ref 70–99)
POTASSIUM SERPL-SCNC: 4.1 MMOL/L (ref 3.5–5.1)
PRO-BNP: 273 PG/ML (ref 0–124)
SODIUM BLD-SCNC: 137 MMOL/L (ref 136–145)

## 2022-04-19 PROCEDURE — 83880 ASSAY OF NATRIURETIC PEPTIDE: CPT

## 2022-04-19 PROCEDURE — 80048 BASIC METABOLIC PNL TOTAL CA: CPT

## 2022-04-19 PROCEDURE — 36415 COLL VENOUS BLD VENIPUNCTURE: CPT

## 2022-04-20 ENCOUNTER — OFFICE VISIT (OUTPATIENT)
Dept: CARDIOLOGY CLINIC | Age: 52
End: 2022-04-20
Payer: COMMERCIAL

## 2022-04-20 ENCOUNTER — HOSPITAL ENCOUNTER (OUTPATIENT)
Age: 52
Setting detail: SPECIMEN
Discharge: HOME | End: 2022-04-20

## 2022-04-20 VITALS
BODY MASS INDEX: 33.12 KG/M2 | HEIGHT: 64 IN | SYSTOLIC BLOOD PRESSURE: 96 MMHG | OXYGEN SATURATION: 94 % | WEIGHT: 194 LBS | DIASTOLIC BLOOD PRESSURE: 62 MMHG | HEART RATE: 65 BPM

## 2022-04-20 DIAGNOSIS — E78.49 OTHER HYPERLIPIDEMIA: ICD-10-CM

## 2022-04-20 DIAGNOSIS — I25.10 CORONARY ARTERY DISEASE INVOLVING NATIVE CORONARY ARTERY OF NATIVE HEART WITHOUT ANGINA PECTORIS: ICD-10-CM

## 2022-04-20 DIAGNOSIS — R06.02 SOB (SHORTNESS OF BREATH): ICD-10-CM

## 2022-04-20 DIAGNOSIS — I89.0 LYMPHEDEMA OF UPPER EXTREMITY FOLLOWING LYMPHADENECTOMY: ICD-10-CM

## 2022-04-20 DIAGNOSIS — E89.89 LYMPHEDEMA OF UPPER EXTREMITY FOLLOWING LYMPHADENECTOMY: ICD-10-CM

## 2022-04-20 DIAGNOSIS — E55.9 VITAMIN D DEFICIENCY: ICD-10-CM

## 2022-04-20 DIAGNOSIS — I10 ESSENTIAL HYPERTENSION: ICD-10-CM

## 2022-04-20 DIAGNOSIS — I50.32 CHRONIC HEART FAILURE WITH PRESERVED EJECTION FRACTION (HCC): Primary | ICD-10-CM

## 2022-04-20 PROCEDURE — G8417 CALC BMI ABV UP PARAM F/U: HCPCS | Performed by: INTERNAL MEDICINE

## 2022-04-20 PROCEDURE — 1036F TOBACCO NON-USER: CPT | Performed by: INTERNAL MEDICINE

## 2022-04-20 PROCEDURE — 99215 OFFICE O/P EST HI 40 MIN: CPT | Performed by: INTERNAL MEDICINE

## 2022-04-20 PROCEDURE — G8427 DOCREV CUR MEDS BY ELIG CLIN: HCPCS | Performed by: INTERNAL MEDICINE

## 2022-04-20 PROCEDURE — 3017F COLORECTAL CA SCREEN DOC REV: CPT | Performed by: INTERNAL MEDICINE

## 2022-04-20 NOTE — PROGRESS NOTES
[facility]  Advanced CHF/Pulmonary Hypertension   Cardiac Follow up       Wendy Freedman  YOB: 1970    Date of Visit:  4/20/22    Chief Complaint   Patient presents with    Congestive Heart Failure    Shortness of Breath     History of Present Illness:  Ms. Ari Akhtar is a  47 y.o. female with chronic dCHF, CAD with cardiac cath 2014 at [facility] showing diffuse LAD disease and small vessel disease with normal RCA and LCX (treated medically).  Breast cancer 8+ years ago s/p mastectomy.  LUIS with cpap non compliant.     Her cardiac cath in 2014 showed diffuse LAD disease . She has had issues with fluid overload but multiple echos have shown normal EF and diastolic function. Cardiac MRI performed showed normal LVEF 61% and no evidence of constriction. She was hospitalized 1/12-1/19/18 CXR showed mild pulmonary edema, proBNP 277. VQ neg for PE and flu negative. She was treated for exacerbation of asthma and HFpEF. ~ten years ago she had radiation for breast cancer. She follows with endocrinology Dr. Bunny Quiñones. She had a LHC/RHC  2/28/18 to r/o pericardial constriction as cause for her frequent episodes of fluid overload due to her history of radiation therapy to left breast. Also was looking for restriction that could cause fluid build up as well. No evidence found. Coronaries showed small vessel CAD due to diabetes. No constriction or restriction. PCWP is about 21 and normal. She has chronic rheumatoid arthritis, gout and lymphedema with CKD. She takes pain meds and took one Percocet before coming to the office, and she also takes Oxycontin. She has constipation with pain meds and takes metamucil. At last visit 8/10/21, she was sent to the infusion center for Lasix 120mg IVP and 20mg/hr x 2 hours. Today, she is here for regular follow up. Her  reports on her condition and answers most questions; he speaks better [place].  He said her [address] will end soon unless a new order is given; they draw her blood. She continues with the left sided lymphedema, not much improvement. She has chronic chest pain, not worsening. She has ongoing chronic SOB. Mild palpitations at times. She denies light-headedness. Allergies   Allergen Reactions    Iv Dye [Iodides] Anaphylaxis     allergic to ct scan dye, not the MRI    Diazepam Other (See Comments)    Insulin Glargine Other (See Comments)     High blood sugar     Trazodone And Nefazodone Other (See Comments)     Makes patient feel very sluggish     Current Outpatient Medications   Medication Sig Dispense Refill    montelukast (SINGULAIR) 10 MG tablet take 1 tablet by mouth nightly 30 tablet 0    AMITIZA 24 MCG capsule TAKE 1 CAPSULE BY MOUTH TWO TIMES A DAY WITH MEALS 60 capsule 0    SM SENNA-S 8.6-50 MG per tablet TAKE 2 TABLETS BY MOUTH TWO TIMES A  tablet 0    insulin aspart (NOVOLOG FLEXPEN) 100 UNIT/ML injection pen inject 30 to 50 units into the skin three time daily before meals 60 mL 3    ranolazine (RANEXA) 500 MG extended release tablet TAKE 1 TABLET BY MOUTH TWO TIMES A  tablet 3    metOLazone (ZAROXOLYN) 2.5 MG tablet TAKE 1 TABLET BY MOUTH TWO TIMES A WEEK AS NEEDED FOR WEIGHT OVER 180 POUNDS.  DO NOT TAKE 2 DAYS IN A ROW. (Patient taking differently: Take 2.5 mg by mouth twice a week ) 24 tablet 0    pregabalin (LYRICA) 75 MG capsule Take 1 cap twice a day 60 capsule 2    FEROSUL 325 (65 Fe) MG tablet TAKE 1 TABLET BY MOUTH TWO TIMES A DAY WITH MEALS 180 tablet 0    omeprazole (PRILOSEC) 20 MG delayed release capsule TAKE 1 CAPSULE BY MOUTH TWO TIMES A DAY 60 capsule 5    Febuxostat 80 MG TABS Take 80 mg by mouth daily 90 tablet 1    sulfaSALAzine (AZULFIDINE) 500 MG tablet TAKE 1 TABLET BY MOUTH TWO TIMES A  tablet 0    leflunomide (ARAVA) 20 MG tablet Take 1 tablet by mouth daily 30 tablet 2    simvastatin (ZOCOR) 20 MG tablet TAKE 1 TABLET BY MOUTH EVERY DAY AT NIGHT 90 tablet 0    metoprolol tartrate (LOPRESSOR) 25 MG tablet TAKE 1 TABLET BY MOUTH TWO TIMES A  tablet 0    TRESIBA FLEXTOUCH 200 UNIT/ML SOPN INJECT 200 UNITS SUBCUTANEOUSLY ONCE DAILY 20 pen 1    levothyroxine (SYNTHROID) 150 MCG tablet TAKE 1 TABLET BY MOUTH IN THE MORNING (BEFORE BREAKFAST) 90 tablet 0    mineral oil-hydrophilic petrolatum (HYDROPHOR) ointment Apply topically as needed to hands and feet. 454 g 5    albuterol (PROVENTIL) (2.5 MG/3ML) 0.083% nebulizer solution Take 3 mLs by nebulization every 6 hours as needed for Wheezing or Shortness of Breath 120 each 1    STEGLATRO 5 MG TABS TAKE 1 TABLET BY MOUTH ONCE DAILY 90 tablet 1    spironolactone (ALDACTONE) 50 MG tablet TAKE 2 TABLETS BY MOUTH IN THE MORNING AND THEN TAKE 1 TABLET BY MOUTH IN THE EVENING 270 tablet 3    LINZESS 290 MCG CAPS capsule TAKE 1 CAPSULE BY MOUTH ONE TIME A DAY FOR 90 DAYS      REXULTI 2 MG TABS tablet Take 2 mg by mouth at bedtime       methocarbamol (ROBAXIN-750) 750 MG tablet Take 1 tablet by mouth 3 times daily as needed (pain and spasm) 30 tablet 0    nitroGLYCERIN (NITROSTAT) 0.4 MG SL tablet PLACE ONE TABLET UNDER TONGUE AS NEEDED FOR CHEST PAIN, MAY REPEAT EVERY 5 MINUTES AS NEEDED UP TO A MAX OF 3 TABLETS. IF NO RELIEF AFTER 1 DOSE, CALL 911. 25 tablet 0    torsemide (DEMADEX) 100 MG tablet TAKE 1 TABLET BY MOUTH IN THE MORNING AND 1/2 TABLET BY MOUTH IN THE EVENING 135 tablet 3    vitamin D3 (CHOLECALCIFEROL) 25 MCG (1000 UT) TABS tablet TAKE 3 TABLETS BY MOUTH ONE TIME A DAY 90 tablet 5    traZODone (DESYREL) 50 MG tablet Take 1 tablet by mouth nightly Take it on the day of sleep study 1 tablet 0    loratadine (CLARITIN) 10 MG tablet TAKE 1 TABLET BY MOUTH ONE TIME A DAY  30 tablet 5    ketoconazole (NIZORAL) 2 % shampoo Apply topically daily as needed. 120 mL 1    diazePAM (VALIUM) 5 MG tablet Take 5 mg by mouth 2 times daily as needed for Anxiety.        OXYGEN Inhale 2 L into the lungs nightly Sometimes with activity      oxyCODONE (OXYCONTIN) 20 MG extended release tablet Take 20 mg by mouth every 12 hours.  aspirin 81 MG EC tablet Take 81 mg by mouth daily      benztropine (COGENTIN) 1 MG tablet Take 1 mg by mouth nightly       folic acid (FOLVITE) 1 MG tablet Take 1 mg by mouth daily      oxyCODONE-acetaminophen (PERCOCET)  MG per tablet Take 1 tablet by mouth every 4 hours as needed for Pain . Earliest Fill Date: 6/8/17 100 tablet 0    duloxetine (CYMBALTA) 60 MG capsule Take 60 mg by mouth 2 times daily.  ONETOUCH VERIO strip use to test blood sugar 5 times daily 200 strip 5    Continuous Blood Gluc Sensor (FREESTYLE EMERALD 2 SENSOR) MISC Change every 14 days 2 each 5    Continuous Blood Gluc  (FREESTYLE EMERALD 2 READER) MINDY To check glucose levels 1 each 0    Urea 40 % LOTN Apply to feet nightly and cover with Aquaphor 325 mL 5    insulin aspart (NOVOLOG) 100 UNIT/ML injection vial Use up to 30 units with each meal 3 each 3    polyethylene glycol (GLYCOLAX) 17 GM/SCOOP powder TAKE 17 GRAMS (1 CAPFUL) BY MOUTH NIGHTLY 510 g 0    Blood Glucose Monitoring Suppl (ONETOUCH VERIO) w/Device KIT Use to check glucose 4 times daily. 1 kit 0    Insulin Pen Needle (B-D UF III MINI PEN NEEDLES) 31G X 5 MM MISC use five times daily with insulin 200 each 5    nystatin (MYCOSTATIN) 422642 UNIT/ML suspension Take 5 mLs by mouth 4 times daily 500 mL 1    Magic Mouthwash (MIRACLE MOUTHWASH) Swish and spit 5 mLs 4 times daily as needed for Irritation or Pain 300 mL 2     No current facility-administered medications for this visit.      Past Medical History:   Diagnosis Date    Anxiety     Anxiety and depression     Arthritis     not sure of specific type    Asthma     CAD (coronary artery disease)     Cancer [facility] 2007    left breast    Cerebral artery occlusion with cerebral infarction ([facility] [facility]     2000, 2001    CHF (congestive heart failure) ([facility] [facility] 2018    Chronic kidney disease     renal insufficency/to see Dr John Billy    Chronic pain     Constipation     [facility] obstructive [facility] disease) [facility]     Depression     Diabetes mellitus ([facility] [facility]     Diabetic polyneuropathy associated with type 2 diabetes mellitus ([facility] 2018    Dysthymia 2018    ESBL (extended spectrum beta-lactamase) producing bacteria infection 2018    urine    Fibromyalgia     Gastric ulcer, unspecified as acute or chronic, without mention of hemorrhage, perforation, or obstruction     GERD (gastroesophageal reflux disease)     Gout     HIGH CHOLESTEROL     Hypertension     Hypothyroidism     Severe persistent asthma without complication 1247    Thyroid disease     TIA (transient ischemic attack)     with occasional left leg and hand weakness     Past Surgical History:   Procedure Laterality Date    BREAST SURGERY      left mastectomy    CARPAL TUNNEL RELEASE      Bilateral    FINGER CONTRACTURE SURGERY      HYSTERECTOMY  2005    USO    TONSILLECTOMY      UPPER GASTROINTESTINAL ENDOSCOPY  2019    stretched esophagous      Family History   Problem Relation Age of Onset    Asthma Other     Cancer Other     Depression Other     Diabetes Other     Hypertension Other     High Cholesterol Other     Migraines Other     Heart Attack Father 72         of MI    High Blood Pressure Mother     Diabetes Mother      Social History     Socioeconomic History    Marital status:      Spouse name: Rita Ward Number of children: 3    Years of education: Not on file    Highest education level: Not on file   Occupational History    Occupation: disabled   Tobacco Use    Smoking status: Never Smoker    Smokeless tobacco: Never Used   Vaping Use    Vaping Use: Never used   Substance and Sexual Activity    Alcohol use: No    Drug use: No    Sexual activity: Not Currently   Other Topics Concern    Not on file   Social History Narrative    Not on file     Social Determinants of Health     Financial Resource Strain: Low Risk     Difficulty of Paying Living Expenses: Not hard at all   Food Insecurity: No Food Insecurity    Worried About Running Out of Food in the Last Year: Never true    Linda of Food in the Last Year: Never true   Transportation Needs:     Lack of Transportation (Medical): Not on file    Lack of Transportation (Non-Medical): Not on file   Physical Activity:     Days of Exercise per Week: Not on file    Minutes of Exercise per Session: Not on file   Stress:     Feeling of Stress : Not on file   Social Connections:     Frequency of Communication with Friends and Family: Not on file    Frequency of Social Gatherings with Friends and Family: Not on file    Attends [employer] Services: Not on file    Active Member of [address] [place] [address] or Organizations: Not on file    Attends Club or Organization Meetings: Not on file    Marital Status: Not on file   Intimate Partner Violence:     Fear of Current or Ex-Partner: Not on file    Emotionally Abused: Not on file    Physically Abused: Not on file    Sexually Abused: Not on file   Housing Stability:     Unable to Pay for Housing in the Last Year: Not on file    Number of [place] in the Last Year: Not on file    Unstable Housing in the Last Year: Not on file     Review of Systems:   · Constitutional: there has been no unanticipated weight loss. There's been no change in energy level, sleep pattern, or activity level. · Eyes: No visual changes or diplopia. No scleral icterus. · ENT: No Headaches, hearing loss or vertigo. No mouth sores or sore throat. · Cardiovascular: Reviewed in HPI  · Respiratory: No cough or wheezing, no sputum production. No hematemesis. · Gastrointestinal: No abdominal pain, appetite loss, blood in stools. No change in bowel or bladder habits. · Genitourinary: No dysuria, trouble voiding, or hematuria.   · Musculoskeletal:  No gait disturbance, weakness or joint complaints. · Integumentary: No rash or pruritis. · Neurological: No headache, diplopia, change in muscle strength, numbness or tingling. No change in gait, balance, coordination, mood, affect, memory, mentation, behavior. · Psychiatric: No anxiety, no depression. · Endocrine: No malaise, fatigue or temperature intolerance. No excessive thirst, fluid intake, or urination. No tremor. · Hematologic/Lymphatic: No abnormal bruising or bleeding, blood clots or swollen lymph nodes. · Allergic/Immunologic: No nasal congestion or hives. Physical Examination:    BP 96/62   Pulse 65   Ht 5' 4\" (1.626 m)   Wt 194 lb (88 kg)   SpO2 94%   BMI 33.30 kg/m²       Wt Readings from Last 3 Encounters:   04/20/22 194 lb (88 kg)   03/15/22 196 lb 9.6 oz (89.2 kg)   03/14/22 196 lb (88.9 kg)     BP Readings from Last 3 Encounters:   04/20/22 96/62   03/15/22 107/65   03/14/22 128/82     Constitutional and General Appearance:   WD/WN, less swollen than usual  HEENT:  NC/AT  XANDER  No problems with hearing  Skin:  Warm, dry  Respiratory:  · Normal excursion and expansion without use of accessory muscles  · Resp Auscultation: Normal breath sounds without dullness  Cardiovascular:  · The apical impulses not displaced  · Heart tones are crisp and normal  · Cervical veins are not engorged  · The carotid upstroke is normal in amplitude and contour without delay or bruit  · JVP 8-9 cm H2O  RRR with nl S1 and S2 without m,r,g  · Peripheral pulses are symmetrical and full  · There is no clubbing, cyanosis of the extremities. Bilateral  hand edema is increased 3+. Fingers very swollen. L>R  · bilateral trace leg edema.   R>L  · Femoral Arteries: 2+ and equal  · Pedal Pulses: 2+ and equal   Neck:  · No thyromegaly  Abdomen:  abdominal girth soft  · No masses or tenderness  · Liver/Spleen: No Abnormalities Noted  Neurological/Psychiatric:  · Alert and oriented in all spheres  · Moves all extremities well  · Exhibits normal gait balance and coordination  · No abnormalities of mood, affect, memory, mentation, or behavior are noted    Diagnostics:    Stress Test 7-1-2020  [place] is normal isotope uptake at stress and rest. There is no evidence of     myocardial ischemia or scar.     Normal LV function.     Left ventricular ejection fraction of 59 %.     Overall findings represent a low risk scan.         Stress Protocols          Resting ECG     Normal sinus rhythm. Left Heart Cath 2/27/18:  [place]ominance : Right     LM: bifurcating, MLI  LAD: mild plaquing with small vessel disease distally   LCx: no significant disease  RCA: MLI     LVEDP: 25 mmHg   No Ao gradient     Right Heart Cath   RA: 13  RV: 47/6/16  PA:   46/19    and mean of 32  PCWP: 21 mmHg      Sats:  Ao: 92%  RA: 61%  PA: 62% (x 2)   CO/CI : 6.1 L    CXR 1/15/18:  No acute cardiopulmonary disease     Echo 11/8/2017:  Normal left ventricle size and systolic function with an estimated ejection   fraction of 60%. No regional wall motion abnormalities are seen.  [place] is borderline concentric left ventricular hypertrophy.   E/e\"= 13     Stress test 11/8/2017: There is normal isotope uptake at stress and rest. There is no evidence of  myocardial ischemia or scar.  Normal LV function.  Overall findings represent a low risk scan.       VQ scan negative 1/11/2018    CXR 1/16/18  No acute cardiopulmonary disease     Echo 11/8/2017:  Normal left ventricle size and systolic function with an estimated ejection fraction of 60%. No regional wall motion abnormalities are seen. There is borderline concentric left ventricular hypertrophy.   E/e\"= 13     Stress test 11/8/2017: There is normal isotope uptake at stress and rest. There is no evidence of  myocardial ischemia or scar.  Normal LV function.  Overall findings represent a low risk scan.       VQ scan 1/11/2018:  Normal study.  No evidence of pulmonary embolus.     Labs were reviewed including labs from other hospital systems through [facility]. Cardiac testing was reviewed including echos, nuclear scans, cardiac catheterization, including from other hospital systems through [facility].     Assessment:  1. Chronic heart failure with preserved ejection fraction ([address]    2. Coronary artery disease involving native coronary artery of native heart without angina pectoris    3. Essential hypertension    4. SOB (shortness of breath)    5. Lymphedema of upper extremity following lymphadenectomy    6. Other hyperlipidemia    7. Vitamin D deficiency       Chronic heart failure with preserved ejection fraction:    Stable, compensated. ProBNP 1/24/22> 280; 2/8/22> 508; 3/8/22> 166; 4/5/22> 291; 4/19/22> 273    Keep weight at 175-180 pounds: Torsemide 100mg in the am and 50mg in the evening. Less than 174 pounds, take torsemide 50 mg twice daily. Continue Metolazone Wed & Sun only. Coronary artery disease  Chronic chest pain usually relieved with 1-2 NTG. Not worsening. [facility] 2014 with diffuse LAD disease and small vessel disease. SOB (shortness of breath), chronic     -Continue Spironolactone and Torsemide.    -Labs every 2 weeks with [facility]> needs updated order. Essential hypertension  BP 96/62   Pulse 65   Ht 5' 4\" (1.626 m)   Wt 194 lb (88 kg)   SpO2 94%   BMI 33.30 kg/m²   -Low, no c/o dizziness    LUIS (obstructive sleep apnea)  Uses CPAP therapy. Hyperlipidemia  LDL goal <70    2/2019> . Continue Zocor. Renal insufficiency, chronic  4/9/22> creat 1.6 / BUN 39 / K+ 4.1    Lymphedema of upper extremity following lymphadenectomy:  L>R.     -++ chronic edema to left hand and arm as well as leg. Worse with weight gain  Try to keep them elevated. She uses automatic lymph machine which works better for her than compression hose. Recommend still wearing hose after using the machine. Rheumatoid arthritis involving multiple sites  She is on chronic pain meds.         PLAN:  All cardiac test and lab results personally reviewed by me during this office visit. 1. No med changes  2. Labs soon> Lipids & Vit D. Continue [id number] BMP/BNP thru Derrek Patino nurse. 3. Zippered compression hose 20-30mmHg for use during the day after using the pneumatic machine  4. RTO in with ECHO same day  5. Updated home health care order placed then faxed to [facility]             Time Based Itemization  A total of 40 minutes was spent on today's patient encounter. If applicable, non-patient-facing activities:  ( x)Preparing to see the patient and reviewing records  (x ) Individual interpretation of results  (x ) Discussion or coordination of care with other health care professionals. ( x) Ordering of unique tests, medications, or procedures  ( x) Documentation within the EHR                                             :      Sebas Onofre  [facility] [address] attestation: This note was scribed in the presence of Dr. Magdy Cutler MD, by Justice Arana RN. The scribe's documentation has been prepared under my direction and personally reviewed by me in its entirety. I confirm that the note above accurately reflects all work, treatment, procedures, and medical decision making performed by me.

## 2022-04-21 RX ORDER — LUBIPROSTONE 24 UG/1
CAPSULE, GELATIN COATED ORAL
Qty: 60 CAPSULE | Refills: 0 | Status: SHIPPED | OUTPATIENT
Start: 2022-04-21 | End: 2022-06-23

## 2022-04-26 ENCOUNTER — OFFICE VISIT (OUTPATIENT)
Dept: INTERNAL MEDICINE CLINIC | Age: 52
End: 2022-04-26
Payer: COMMERCIAL

## 2022-04-26 ENCOUNTER — HOSPITAL ENCOUNTER (OUTPATIENT)
Age: 52
Setting detail: SPECIMEN
Discharge: HOME | End: 2022-04-26
Payer: COMMERCIAL

## 2022-04-26 ENCOUNTER — TELEPHONE (OUTPATIENT)
Dept: CARDIOLOGY CLINIC | Age: 52
End: 2022-04-26

## 2022-04-26 VITALS
WEIGHT: 197.2 LBS | HEART RATE: 72 BPM | SYSTOLIC BLOOD PRESSURE: 120 MMHG | BODY MASS INDEX: 33.85 KG/M2 | OXYGEN SATURATION: 90 % | DIASTOLIC BLOOD PRESSURE: 64 MMHG

## 2022-04-26 DIAGNOSIS — R32 URINARY INCONTINENCE IN FEMALE: ICD-10-CM

## 2022-04-26 DIAGNOSIS — M10.9 ACUTE GOUT OF RIGHT ANKLE, UNSPECIFIED CAUSE: Primary | ICD-10-CM

## 2022-04-26 DIAGNOSIS — R39.11 URINARY HESITANCY: ICD-10-CM

## 2022-04-26 DIAGNOSIS — G47.33 OBSTRUCTIVE SLEEP APNEA SYNDROME: ICD-10-CM

## 2022-04-26 DIAGNOSIS — G44.211 INTRACTABLE EPISODIC TENSION-TYPE HEADACHE: ICD-10-CM

## 2022-04-26 LAB
ANION GAP SERPL CALCULATED.3IONS-SCNC: 12 MMOL/L (ref 3–16)
BILIRUBIN, POC: NORMAL
BLOOD URINE, POC: NORMAL
BUN BLDV-MCNC: 46 MG/DL (ref 7–20)
CALCIUM SERPL-MCNC: 9.3 MG/DL (ref 8.3–10.6)
CHLORIDE BLD-SCNC: 98 MMOL/L (ref 99–110)
CLARITY, POC: NORMAL
CO2: 29 MMOL/L (ref 21–32)
COLOR, POC: NORMAL
CREAT SERPL-MCNC: 1.8 MG/DL (ref 0.6–1.1)
GFR AFRICAN AMERICAN: 36
GFR NON-AFRICAN AMERICAN: 29
GLUCOSE BLD-MCNC: 334 MG/DL (ref 70–99)
GLUCOSE URINE, POC: NORMAL
KETONES, POC: NORMAL
LEUKOCYTE EST, POC: NORMAL
NITRITE, POC: NORMAL
PH, POC: 5.5
POTASSIUM SERPL-SCNC: 4.4 MMOL/L (ref 3.5–5.1)
PRO-BNP: 249 PG/ML (ref 0–124)
PROTEIN, POC: NORMAL
SODIUM BLD-SCNC: 139 MMOL/L (ref 136–145)
SPECIFIC GRAVITY, POC: 1.01
URIC ACID, SERUM: 6.4 MG/DL (ref 2.6–6)
UROBILINOGEN, POC: 0.2

## 2022-04-26 PROCEDURE — 83721 ASSAY OF BLOOD LIPOPROTEIN: CPT

## 2022-04-26 PROCEDURE — 83880 ASSAY OF NATRIURETIC PEPTIDE: CPT

## 2022-04-26 PROCEDURE — 99214 OFFICE O/P EST MOD 30 MIN: CPT | Performed by: INTERNAL MEDICINE

## 2022-04-26 PROCEDURE — 80061 LIPID PANEL: CPT

## 2022-04-26 PROCEDURE — 36415 COLL VENOUS BLD VENIPUNCTURE: CPT

## 2022-04-26 PROCEDURE — G8427 DOCREV CUR MEDS BY ELIG CLIN: HCPCS | Performed by: INTERNAL MEDICINE

## 2022-04-26 PROCEDURE — 80048 BASIC METABOLIC PNL TOTAL CA: CPT

## 2022-04-26 PROCEDURE — 84550 ASSAY OF BLOOD/URIC ACID: CPT

## 2022-04-26 PROCEDURE — 82306 VITAMIN D 25 HYDROXY: CPT

## 2022-04-26 PROCEDURE — G8417 CALC BMI ABV UP PARAM F/U: HCPCS | Performed by: INTERNAL MEDICINE

## 2022-04-26 PROCEDURE — 1036F TOBACCO NON-USER: CPT | Performed by: INTERNAL MEDICINE

## 2022-04-26 PROCEDURE — 81002 URINALYSIS NONAUTO W/O SCOPE: CPT | Performed by: INTERNAL MEDICINE

## 2022-04-26 PROCEDURE — 3017F COLORECTAL CA SCREEN DOC REV: CPT | Performed by: INTERNAL MEDICINE

## 2022-04-26 RX ORDER — PETROLATUM 42 G/100G
OINTMENT TOPICAL
Qty: 454 G | Refills: 5 | Status: SHIPPED | OUTPATIENT
Start: 2022-04-26

## 2022-04-26 RX ORDER — UREA 40 G/100G
LOTION TOPICAL
Qty: 325 ML | Refills: 5 | Status: SHIPPED | OUTPATIENT
Start: 2022-04-26

## 2022-04-26 NOTE — PROGRESS NOTES
Patient: Karolina Ozuna is a 47 y.o. female who presents today with the following Chief Complaint(s):  Chief Complaint   Patient presents with    Check-Up       HPI     C/o right ankle pain and swelling x 1 week, hard to walk. Needs new rx for Urea cream and Aquaphor- pharmacy lost script. When voiding bladder, she will urinate twice and then when she stands up will leak all over the place. Happens most of the time. Having urinary hesitancy. Also having issues with nocturnal incontinence. No constipation. Has upcoming appointment with urology in [facility] but not until June. With [facility]. Has not yet had MRI of brain that was ordered at appointment in March.  Was not called to schedule./   Allergies   Allergen Reactions    Iv Dye [Iodides] Anaphylaxis     allergic to ct scan dye, not the MRI    Diazepam Other (See Comments)    Insulin Glargine Other (See Comments)     High blood sugar     Trazodone And Nefazodone Other (See Comments)     Makes patient feel very sluggish      Past Medical History:   Diagnosis Date    Anxiety     Anxiety and depression     Arthritis     not sure of specific type    Asthma     CAD (coronary artery disease)     Cancer ([address] 2007    left breast    Cerebral artery occlusion with cerebral infarction ([address]     2000, 2001    CHF (congestive heart failure) ([address] 2018    Chronic kidney disease     renal insufficency/to see Dr Megan Conde    Chronic pain     Constipation     COPD (chronic obstructive pulmonary disease) ([address]     Depression     Diabetes mellitus ([address]     Diabetic polyneuropathy associated with type 2 diabetes mellitus ([address] 2/2/2018    Dysthymia 2/2/2018    ESBL (extended spectrum beta-lactamase) producing bacteria infection 03/14/2018    urine    Fibromyalgia     Gastric ulcer, unspecified as acute or chronic, without mention of hemorrhage, perforation, or obstruction     GERD (gastroesophageal reflux disease)     Gout     HIGH CHOLESTEROL     Hypertension     Hypothyroidism     Severe persistent asthma without complication 3/9/7349    Thyroid disease     TIA (transient ischemic attack)     with occasional left leg and hand weakness      Past Surgical History:   Procedure Laterality Date    BREAST SURGERY      left mastectomy    CARPAL TUNNEL RELEASE      Bilateral    FINGER CONTRACTURE SURGERY      HYSTERECTOMY  2005    USO    TONSILLECTOMY      UPPER GASTROINTESTINAL ENDOSCOPY  2019    stretched esophagous       Social History     Socioeconomic History    Marital status:      Spouse name: Anurag Geiger Number of children: 3    Years of education: Not on file    Highest education level: Not on file   Occupational History    Occupation: disabled   Tobacco Use    Smoking status: Never Smoker    Smokeless tobacco: Never Used   Vaping Use    Vaping Use: Never used   Substance and Sexual Activity    Alcohol use: No    Drug use: No    Sexual activity: Not Currently   Other Topics Concern    Not on file   Social History Narrative    Not on file     Social Determinants of Health     Financial Resource Strain: Low Risk     Difficulty of Paying Living Expenses: Not hard at all   Food Insecurity: No Food Insecurity    Worried About Running Out of Food in the Last Year: Never true    Linda of Food in the Last Year: Never true   Transportation Needs:     Lack of Transportation (Medical): Not on file    Lack of Transportation (Non-Medical):  Not on file   Physical Activity:     Days of Exercise per Week: Not on file    Minutes of Exercise per Session: Not on file   Stress:     Feeling of Stress : Not on file   Social Connections:     Frequency of Communication with Friends and Family: Not on file    Frequency of Social Gatherings with Friends and Family: Not on file    Attends Jew Services: Not on file    Active Member of Clubs or Organizations: Not on file    Attends Club or Organization Meetings: Not on file    Marital Status: Not on file   Intimate Partner Violence:     Fear of Current or Ex-Partner: Not on file    Emotionally Abused: Not on file    Physically Abused: Not on file    Sexually Abused: Not on file   Housing Stability:     Unable to Pay for Housing in the Last Year: Not on file    Number of [place] in the Last Year: Not on file    Unstable Housing in the Last Year: Not on file     Family History   Problem Relation Age of Onset    Asthma Other     Cancer Other     Depression Other     Diabetes Other     Hypertension Other     High Cholesterol Other     Migraines Other     Heart Attack Father 72         of MI    High Blood Pressure Mother     Diabetes Mother         Outpatient Medications Prior to Visit   Medication Sig Dispense Refill    lubiprostone (AMITIZA) 24 MCG capsule TAKE 1 CAPSULE BY MOUTH TWO TIMES A DAY WITH MEALS 60 capsule 0    Compression Stockings MISC by Does not apply route Zippered stockings for daily use on lower extremities (do not wear at night). 20-30mmHg. 1 each 1    montelukast (SINGULAIR) 10 MG tablet take 1 tablet by mouth nightly 30 tablet 0    SM SENNA-S 8.6-50 MG per tablet TAKE 2 TABLETS BY MOUTH TWO TIMES A  tablet 0    ONETOUCH VERIO strip use to test blood sugar 5 times daily 200 strip 5    insulin aspart (NOVOLOG FLEXPEN) 100 UNIT/ML injection pen inject 30 to 50 units into the skin three time daily before meals 60 mL 3    ranolazine (RANEXA) 500 MG extended release tablet TAKE 1 TABLET BY MOUTH TWO TIMES A  tablet 3    metOLazone (ZAROXOLYN) 2.5 MG tablet TAKE 1 TABLET BY MOUTH TWO TIMES A WEEK AS NEEDED FOR WEIGHT OVER 180 POUNDS.  DO NOT TAKE 2 DAYS IN A ROW. (Patient taking differently: Take 2.5 mg by mouth twice a week ) 24 tablet 0    Continuous Blood Gluc Sensor (FREESTYLE EMERALD 2 SENSOR) MISC Change every 14 days 2 each 5    Continuous Blood Gluc  (FREESTYLE EMERALD 2 READER) MINDY To check glucose levels 1 each 0    FEROSUL 325 (65 Fe) MG tablet TAKE 1 TABLET BY MOUTH TWO TIMES A DAY WITH MEALS 180 tablet 0    omeprazole (PRILOSEC) 20 MG delayed release capsule TAKE 1 CAPSULE BY MOUTH TWO TIMES A DAY 60 capsule 5    Febuxostat 80 MG TABS Take 80 mg by mouth daily 90 tablet 1    sulfaSALAzine (AZULFIDINE) 500 MG tablet TAKE 1 TABLET BY MOUTH TWO TIMES A  tablet 0    leflunomide (ARAVA) 20 MG tablet Take 1 tablet by mouth daily 30 tablet 2    simvastatin (ZOCOR) 20 MG tablet TAKE 1 TABLET BY MOUTH EVERY DAY AT NIGHT 90 tablet 0    metoprolol tartrate (LOPRESSOR) 25 MG tablet TAKE 1 TABLET BY MOUTH TWO TIMES A  tablet 0    TRESIBA FLEXTOUCH 200 UNIT/ML SOPN INJECT 200 UNITS SUBCUTANEOUSLY ONCE DAILY 20 pen 1    levothyroxine (SYNTHROID) 150 MCG tablet TAKE 1 TABLET BY MOUTH IN THE MORNING (BEFORE BREAKFAST) 90 tablet 0    albuterol (PROVENTIL) (2.5 MG/3ML) 0.083% nebulizer solution Take 3 mLs by nebulization every 6 hours as needed for Wheezing or Shortness of Breath 120 each 1    STEGLATRO 5 MG TABS TAKE 1 TABLET BY MOUTH ONCE DAILY 90 tablet 1    insulin aspart (NOVOLOG) 100 UNIT/ML injection vial Use up to 30 units with each meal 3 each 3    spironolactone (ALDACTONE) 50 MG tablet TAKE 2 TABLETS BY MOUTH IN THE MORNING AND THEN TAKE 1 TABLET BY MOUTH IN THE EVENING 270 tablet 3    polyethylene glycol (GLYCOLAX) 17 GM/SCOOP powder TAKE 17 GRAMS (1 CAPFUL) BY MOUTH NIGHTLY 510 g 0    LINZESS 290 MCG CAPS capsule TAKE 1 CAPSULE BY MOUTH ONE TIME A DAY FOR 90 DAYS      REXULTI 2 MG TABS tablet Take 2 mg by mouth at bedtime       methocarbamol (ROBAXIN-750) 750 MG tablet Take 1 tablet by mouth 3 times daily as needed (pain and spasm) 30 tablet 0    nitroGLYCERIN (NITROSTAT) 0.4 MG SL tablet PLACE ONE TABLET UNDER TONGUE AS NEEDED FOR CHEST PAIN, MAY REPEAT EVERY 5 MINUTES AS NEEDED UP TO A MAX OF 3 TABLETS.   IF NO RELIEF AFTER 1 DOSE, CALL 911. 25 tablet 0    Blood Glucose Monitoring Suppl (ONETOUCH VERIO) w/Device KIT Use to check glucose 4 times daily. 1 kit 0    torsemide (DEMADEX) 100 MG tablet TAKE 1 TABLET BY MOUTH IN THE MORNING AND 1/2 TABLET BY MOUTH IN THE EVENING 135 tablet 3    Insulin Pen Needle (B-D UF III MINI PEN NEEDLES) 31G X 5 MM MISC use five times daily with insulin 200 each 5    vitamin D3 (CHOLECALCIFEROL) 25 MCG (1000 UT) TABS tablet TAKE 3 TABLETS BY MOUTH ONE TIME A DAY 90 tablet 5    nystatin (MYCOSTATIN) 435757 UNIT/ML suspension Take 5 mLs by mouth 4 times daily 500 mL 1    Magic Mouthwash (MIRACLE MOUTHWASH) Swish and spit 5 mLs 4 times daily as needed for Irritation or Pain 300 mL 2    traZODone (DESYREL) 50 MG tablet Take 1 tablet by mouth nightly Take it on the day of sleep study 1 tablet 0    loratadine (CLARITIN) 10 MG tablet TAKE 1 TABLET BY MOUTH ONE TIME A DAY  30 tablet 5    ketoconazole (NIZORAL) 2 % shampoo Apply topically daily as needed. 120 mL 1    diazePAM (VALIUM) 5 MG tablet Take 5 mg by mouth 2 times daily as needed for Anxiety.  OXYGEN Inhale 2 L into the lungs nightly Sometimes with activity      oxyCODONE (OXYCONTIN) 20 MG extended release tablet Take 20 mg by mouth every 12 hours.  aspirin 81 MG EC tablet Take 81 mg by mouth daily      benztropine (COGENTIN) 1 MG tablet Take 1 mg by mouth nightly       folic acid (FOLVITE) 1 MG tablet Take 1 mg by mouth daily      oxyCODONE-acetaminophen (PERCOCET)  MG per tablet Take 1 tablet by mouth every 4 hours as needed for Pain . Earliest Fill Date: 6/8/17 100 tablet 0    duloxetine (CYMBALTA) 60 MG capsule Take 60 mg by mouth 2 times daily.  pregabalin (LYRICA) 75 MG capsule Take 1 cap twice a day 60 capsule 2    Urea 40 % LOTN Apply to feet nightly and cover with Aquaphor 325 mL 5    mineral oil-hydrophilic petrolatum (HYDROPHOR) ointment Apply topically as needed to hands and feet.  454 g 5     No facility-administered medications prior to visit. Patient'spast medical history, surgical history, family history, medications,  and allergies  were all reviewed and updated as appropriate today. Review of Systems    /64   Pulse 72   Wt 197 lb 3.2 oz (89.4 kg)   SpO2 90%   BMI 33.85 kg/m²   Physical Exam  Vitals and nursing note reviewed. Constitutional:       Appearance: She is well-developed. She is not ill-appearing or toxic-appearing. HENT:      Head: Normocephalic. Right Ear: Tympanic membrane, ear canal and external ear normal.      Left Ear: Tympanic membrane, ear canal and external ear normal.      Mouth/Throat:      Pharynx: No oropharyngeal exudate or posterior oropharyngeal erythema. Eyes:      General: No scleral icterus. Extraocular Movements: Extraocular movements intact. Conjunctiva/sclera: Conjunctivae normal.      Pupils: Pupils are equal, round, and reactive to light. Neck:      Thyroid: No thyroid mass or thyromegaly. Vascular: No carotid bruit. Cardiovascular:      Rate and Rhythm: Normal rate and regular rhythm. Heart sounds: Normal heart sounds. No murmur heard. Pulmonary:      Effort: Pulmonary effort is normal.      Breath sounds: Normal breath sounds. Musculoskeletal:      Right lower le+ Edema present. Left lower leg: No edema. Right ankle: Swelling (medial and lateral malleoli. ) present. Tenderness present. Lymphadenopathy:      Cervical: No cervical adenopathy. Neurological:      General: No focal deficit present. Mental Status: She is alert and oriented to person, place, and time. Psychiatric:         Mood and Affect: Mood normal.         Behavior: Behavior normal. Behavior is cooperative. ASSESSMENT/PLAN:    Problem List Items Addressed This Visit     Acute gout of right ankle - Primary     Check uric acid level. Rule out gout as cause of swelling of right leg.          Relevant Orders    Uric Acid    Obstructive sleep apnea syndrome On nocturnal oxygen. Has not tolerated CPAP in the past.  This may be the cause of her nocturnal enuresis. Tension type headache     Likely referred pain from her neck. She has been referred to neurology. Because of her cancer history and MRI has been ordered although she had an unremarkable MRI of her brain less than 1 year ago ordered by Dr. Raquel Jackson. Urinary hesitancy      UA negative for infection. Has appointment with urology but not until June as she culturally she can not see a male physician. Referral offered to Dr. Fernandez Garvey at [facility] to see if she can get her in sooner. Relevant Orders    External Referral To Urology    Culture, Urine (Completed)    POCT Urinalysis no Micro (Completed)    Urinary incontinence in female    Relevant Orders    External Referral To Urology          Current Outpatient Medications   Medication Sig Dispense Refill    Urea 40 % LOTN Apply to feet nightly and cover with Aquaphor 325 mL 5    mineral oil-hydrophilic petrolatum (HYDROPHOR) ointment Apply topically as needed to hands and feet. 454 g 5    lubiprostone (AMITIZA) 24 MCG capsule TAKE 1 CAPSULE BY MOUTH TWO TIMES A DAY WITH MEALS 60 capsule 0    Compression Stockings MISC by Does not apply route Zippered stockings for daily use on lower extremities (do not wear at night). 20-30mmHg.  1 each 1    montelukast (SINGULAIR) 10 MG tablet take 1 tablet by mouth nightly 30 tablet 0    SM SENNA-S 8.6-50 MG per tablet TAKE 2 TABLETS BY MOUTH TWO TIMES A  tablet 0    ONETOUCH VERIO strip use to test blood sugar 5 times daily 200 strip 5    insulin aspart (NOVOLOG FLEXPEN) 100 UNIT/ML injection pen inject 30 to 50 units into the skin three time daily before meals 60 mL 3    ranolazine (RANEXA) 500 MG extended release tablet TAKE 1 TABLET BY MOUTH TWO TIMES A  tablet 3    metOLazone (ZAROXOLYN) 2.5 MG tablet TAKE 1 TABLET BY MOUTH TWO TIMES A WEEK AS NEEDED FOR WEIGHT OVER 180 POUNDS.  DO NOT TAKE 2 DAYS IN A ROW. (Patient taking differently: Take 2.5 mg by mouth twice a week ) 24 tablet 0    Continuous Blood Gluc Sensor (FREESTYLE EMERALD 2 SENSOR) MISC Change every 14 days 2 each 5    Continuous Blood Gluc  (FREESTYLE EMERALD 2 READER) MINDY To check glucose levels 1 each 0    FEROSUL 325 (65 Fe) MG tablet TAKE 1 TABLET BY MOUTH TWO TIMES A DAY WITH MEALS 180 tablet 0    omeprazole (PRILOSEC) 20 MG delayed release capsule TAKE 1 CAPSULE BY MOUTH TWO TIMES A DAY 60 capsule 5    Febuxostat 80 MG TABS Take 80 mg by mouth daily 90 tablet 1    sulfaSALAzine (AZULFIDINE) 500 MG tablet TAKE 1 TABLET BY MOUTH TWO TIMES A  tablet 0    leflunomide (ARAVA) 20 MG tablet Take 1 tablet by mouth daily 30 tablet 2    simvastatin (ZOCOR) 20 MG tablet TAKE 1 TABLET BY MOUTH EVERY DAY AT NIGHT 90 tablet 0    metoprolol tartrate (LOPRESSOR) 25 MG tablet TAKE 1 TABLET BY MOUTH TWO TIMES A  tablet 0    TRESIBA FLEXTOUCH 200 UNIT/ML SOPN INJECT 200 UNITS SUBCUTANEOUSLY ONCE DAILY 20 pen 1    levothyroxine (SYNTHROID) 150 MCG tablet TAKE 1 TABLET BY MOUTH IN THE MORNING (BEFORE BREAKFAST) 90 tablet 0    albuterol (PROVENTIL) (2.5 MG/3ML) 0.083% nebulizer solution Take 3 mLs by nebulization every 6 hours as needed for Wheezing or Shortness of Breath 120 each 1    STEGLATRO 5 MG TABS TAKE 1 TABLET BY MOUTH ONCE DAILY 90 tablet 1    insulin aspart (NOVOLOG) 100 UNIT/ML injection vial Use up to 30 units with each meal 3 each 3    spironolactone (ALDACTONE) 50 MG tablet TAKE 2 TABLETS BY MOUTH IN THE MORNING AND THEN TAKE 1 TABLET BY MOUTH IN THE EVENING 270 tablet 3    polyethylene glycol (GLYCOLAX) 17 GM/SCOOP powder TAKE 17 GRAMS (1 CAPFUL) BY MOUTH NIGHTLY 510 g 0    LINZESS 290 MCG CAPS capsule TAKE 1 CAPSULE BY MOUTH ONE TIME A DAY FOR 90 DAYS      REXULTI 2 MG TABS tablet Take 2 mg by mouth at bedtime       methocarbamol (ROBAXIN-750) 750 MG tablet Take 1 tablet by mouth 3 times daily as needed (pain and spasm) 30 tablet 0    nitroGLYCERIN (NITROSTAT) 0.4 MG SL tablet PLACE ONE TABLET UNDER TONGUE AS NEEDED FOR CHEST PAIN, MAY REPEAT EVERY 5 MINUTES AS NEEDED UP TO A MAX OF 3 TABLETS. IF NO RELIEF AFTER 1 DOSE, CALL 911. 25 tablet 0    Blood Glucose Monitoring Suppl (ONETOUCH VERIO) w/Device KIT Use to check glucose 4 times daily. 1 kit 0    torsemide (DEMADEX) 100 MG tablet TAKE 1 TABLET BY MOUTH IN THE MORNING AND 1/2 TABLET BY MOUTH IN THE EVENING 135 tablet 3    Insulin Pen Needle (B-D UF III MINI PEN NEEDLES) 31G X 5 MM MISC use five times daily with insulin 200 each 5    vitamin D3 (CHOLECALCIFEROL) 25 MCG (1000 UT) TABS tablet TAKE 3 TABLETS BY MOUTH ONE TIME A DAY 90 tablet 5    nystatin (MYCOSTATIN) 092612 UNIT/ML suspension Take 5 mLs by mouth 4 times daily 500 mL 1    Magic Mouthwash (MIRACLE MOUTHWASH) Swish and spit 5 mLs 4 times daily as needed for Irritation or Pain 300 mL 2    traZODone (DESYREL) 50 MG tablet Take 1 tablet by mouth nightly Take it on the day of sleep study 1 tablet 0    loratadine (CLARITIN) 10 MG tablet TAKE 1 TABLET BY MOUTH ONE TIME A DAY  30 tablet 5    ketoconazole (NIZORAL) 2 % shampoo Apply topically daily as needed. 120 mL 1    diazePAM (VALIUM) 5 MG tablet Take 5 mg by mouth 2 times daily as needed for Anxiety.  OXYGEN Inhale 2 L into the lungs nightly Sometimes with activity      oxyCODONE (OXYCONTIN) 20 MG extended release tablet Take 20 mg by mouth every 12 hours.  aspirin 81 MG EC tablet Take 81 mg by mouth daily      benztropine (COGENTIN) 1 MG tablet Take 1 mg by mouth nightly       folic acid (FOLVITE) 1 MG tablet Take 1 mg by mouth daily      oxyCODONE-acetaminophen (PERCOCET)  MG per tablet Take 1 tablet by mouth every 4 hours as needed for Pain . Earliest Fill Date: 6/8/17 100 tablet 0    duloxetine (CYMBALTA) 60 MG capsule Take 60 mg by mouth 2 times daily.         pregabalin (LYRICA) 75 MG capsule Take 1 cap twice a day 60 capsule 2     No current facility-administered medications for this visit. Return in about 2 months (around 6/26/2022).

## 2022-04-26 NOTE — TELEPHONE ENCOUNTER
Home care states pt has a 7 lb weight gain currently 197. Pt has not been keeping log not sure how long of a period. Pulse 71 ox 94 room air, /64,  lungs diminished and had some wheezing. Pt having some sob. Blood was drawn today and taken to [facility] hosp. Please call to advise.

## 2022-04-27 LAB
CHOLESTEROL, TOTAL: 201 MG/DL (ref 0–199)
HDLC SERPL-MCNC: 32 MG/DL (ref 40–60)
LDL CHOLESTEROL CALCULATED: ABNORMAL MG/DL
LDL CHOLESTEROL DIRECT: 107 MG/DL
TRIGL SERPL-MCNC: 484 MG/DL (ref 0–150)
VITAMIN D 25-HYDROXY: 47.2 NG/ML
VLDLC SERPL CALC-MCNC: ABNORMAL MG/DL

## 2022-04-27 NOTE — TELEPHONE ENCOUNTER
Her labs look okay. I suggest we wait a few days until she take metolazone and then see what her weight is.   ARETHA

## 2022-04-27 NOTE — TELEPHONE ENCOUNTER
Aroldo Espana from [facility] called back in stating she is still waiting to her back on what to do about the patients 7lb weight gain.     Julio Garrido can be reached at [phone]

## 2022-04-29 LAB — URINE CULTURE, ROUTINE: NORMAL

## 2022-05-01 PROBLEM — M10.9 ACUTE GOUT OF RIGHT ANKLE: Status: ACTIVE | Noted: 2022-05-01

## 2022-05-01 PROBLEM — R32 URINARY INCONTINENCE IN FEMALE: Status: ACTIVE | Noted: 2022-05-01

## 2022-05-02 NOTE — ASSESSMENT & PLAN NOTE
Likely referred pain from her neck. She has been referred to neurology. Because of her cancer history and MRI has been ordered although she had an unremarkable MRI of her brain less than 1 year ago ordered by Dr. Ingrid Allen.

## 2022-05-02 NOTE — PROGRESS NOTES
breast mass, no skin hives, no skin discoloration, no nipple discharge  Neurological: no numbness, no tingling, no weakness, no confusion, no headaches, no dizziness, no fainting, no tremors, no decrease in memory, no balance problems  Psychiatric: no anxiety, no depression, no insomnia, no change in personality, no emotional problems, no stress  Hematologic/Lymphatic: no tendency for easy bleeding, no swollen lymph nodes, no tendency for easy bruising  Immunology: no seasonal allergies, no frequent infections, no frequent illnesses  Endocrine: no temperature intolerance no hirsutism, no hot flashes    OBJECTIVE:  /78 (Site: Right Arm, Position: Sitting)   Pulse 86   Ht 5' 4\" (1.626 m)   Wt 193 lb (87.5 kg)   Breastfeeding?  No   BMI 33.13 kg/m²    Wt Readings from Last 3 Encounters:   01/31/18 193 lb (87.5 kg)   01/24/18 188 lb 9.6 oz (85.5 kg)   01/19/18 182 lb 8.7 oz (82.8 kg)       Constitutional: no acute distress, well appearing and well nourished  Psychiatric: oriented to person, place and time, judgement and insight and normal, recent and remote memory and intact and mood and affect are normal  Skin: skin and subcutaneous tissue is normal without mass, normal turgor  Head and Face: examination of head and face revealed no abnormalities  Eyes: no lid or conjunctival swelling, erythema or discharge, pupils are normal, equal, round, reactive to light  Ears/Nose: external inspection of ears and nose revealed no abnormalities, hearing is grossly normal  Oropharynx/Mouth/Face: lips, tongue and gums are normal with no lesions, the voice quality was normal  Neck: neck is supple and symmetric, with midline trachea and no masses, thyroid is normal  Lymphatics: normal cervical lymph nodes, normal supraclavicular nodes  Pulmonary: no increased work of breathing or signs of respiratory distress, lungs are clear to auscultation  Cardiovascular: normal heart rate and rhythm, normal S1 and S2, no murmurs and pedal pulses and 2+ bilaterally, No edema  Abdomen: abdomen is soft, non-tender with no masses  Musculoskeletal: normal gait and station and exam of the digits and nails are normal  Neurological: normal coordination and normal general cortical function      Lab Results   Component Value Date    LABA1C 8.4 01/12/2018         ASSESSMENT/PLAN:  1. Type 2 diabetes mellitus without complication, without long-term current use of insulin (HCC)  TYPE 2 DIABETES WITH COMPLICATIONS also appears to have DAN and gastroparesis --UNCONTROLLED but improved A1c 12.8>>9.4 >>10.5 >>8.5 >>9.0 >>7.5 >>11.8>>9.5>>8.0>>9.1>>8.1>>7.3>>7.8>>7.8>>8.5  Her fastings are not at  target alessia now that she is on steroids so she will increase her dose to lantus 50 units BID novolog 30 units to titrate fasting between 100--130   She will stay on metformin and glucotrol and meal boluses which she takes with dinner and lunch   ---Her depression has been hindering in her optimal glycemic control ---she claims to eat only one meal a day and that also has miniaml carbs ? But I m not sure if she really counts her snack at all   We had a lengthy discussion about these issues ---she is reliant on her  to help with diabetes care   we had a lengthy discussion about glycmeic goals and treatment options   Hypoglycemia protocol reviewed in detail with patient Patient was advised to carry glucose tablets and also have glucagon emergency kit. Provided with RX for glucagon.   -advised to have annual dilated eye exam uptodate pos retinopathy follows with Dr Myers [place]   -last microalb/creat ratio elevated at >800 will recheck unable to calculate she was advised to follow with nephrology   foot exam performed revealed moderate distal neuropathy June 2016 she saw podiatry in [facility] who gave her steroid shot she goes for regular follow up   - TSH 3RD GENERATION; Future  - Microalbumin / Creatinine Urine Ratio;  Future  - Hemoglobin A1C; Future  - Comprehensive Metabolic Panel; Future  - Lipid Panel; Future    2. Congenital hypothyroidism with diffuse goiter  Hypothyroidism TSH ) 0.01 >>0.9 >>2 > 1.2 >>2.3   she is on 125 mcg daily continue on same dose   she had thyroid hemiagensis left lobe absent   - TSH 3RD GENERATION; Future  - Microalbumin / Creatinine Urine Ratio; Future  - Hemoglobin A1C; Future  - Comprehensive Metabolic Panel; Future  - Lipid Panel; Future    3. Mixed hyperlipidemia  On statins   - TSH 3RD GENERATION; Future  - Microalbumin / Creatinine Urine Ratio; Future  - Hemoglobin A1C; Future  - Comprehensive Metabolic Panel; Future  - Lipid Panel; Future    4. Hx of breast cancer  Follows with onc     5. Primary fibromyalgia  On Lyrica     6. Malignant neoplasm of overlapping sites of left female breast, unspecified estrogen receptor status ([facility] [address]  Breast cancer s/p surgery and chemo 2008   She is on tamoxifen which according to pt gives her pain in her arms and hence she takes percocet   she is asked to reduce dose of Percocet     7. Essential hypertension  Controlled now     8. Coronary artery disease involving native coronary artery of native heart without angina pectoris  Stable follows with Dr Robin Gonzalez     9. Lymphedema of upper extremity following lymphadenectomy  Stable     10. Dysthymia  Depression   She is on Latuda    Her dose has been adjusted   She appears to be alert and oriented to day     11. Severe persistent asthma without complication  Currently is on steroids for exacerbation     12.  Diabetic polyneuropathy associated with type 2 diabetes mellitus ([facility] [address]  Stable  She is on Cymbalta 60 mg daily her dose was increased as per her psychiatrist       Rh Arthritis   She is on methotrexate and Arava  Plaquenil is also in her med list     ?chf Lele Clink   Follows with cardiology               Reviewed and/or ordered clinical lab results Yes  Reviewed and/or ordered radiology tests Yes  Reviewed and/or ordered other diagnostic tests Yes  Made a decision to obtain old records Yes  Reviewed and summarized old records Yes      Vega Wood was counseled regarding symptoms of current diagnosis, course and complications of disease if inadequately treated, side effects of medications, diagnosis, treatment options, and prognosis, risks, benefits, complications, and alternatives of treatment, labs, imaging and other studies and treatment targets and goals. She understands instructions and counseling. These diagnosis were discussed and reviewed with the patient including the advantages of drug therapy. She was counseled at this visit on the following: diabetes complication prevention and foot care. Return in about 3 months (around 4/30/2018). Yes

## 2022-05-02 NOTE — ASSESSMENT & PLAN NOTE
On nocturnal oxygen. Has not tolerated CPAP in the past.  This may be the cause of her nocturnal enuresis.

## 2022-05-02 NOTE — ASSESSMENT & PLAN NOTE
UA negative for infection. Has appointment with urology but not until June as she culturally she can not see a male physician. Referral offered to Dr. Ela Powell at [facility] to see if she can get her in sooner.

## 2022-05-06 DIAGNOSIS — E03.2 HYPOTHYROIDISM DUE TO MEDICATION: ICD-10-CM

## 2022-05-09 RX ORDER — MONTELUKAST SODIUM 10 MG/1
10 TABLET ORAL NIGHTLY
Qty: 30 TABLET | Refills: 0 | Status: SHIPPED | OUTPATIENT
Start: 2022-05-09 | End: 2022-06-23

## 2022-05-09 RX ORDER — LEVOTHYROXINE SODIUM 0.15 MG/1
TABLET ORAL
Qty: 90 TABLET | Refills: 1 | Status: SHIPPED | OUTPATIENT
Start: 2022-05-09 | End: 2022-06-13

## 2022-05-14 DIAGNOSIS — E03.2 HYPOTHYROIDISM DUE TO MEDICATION: ICD-10-CM

## 2022-05-16 RX ORDER — SENNOSIDES AND DOCUSATE SODIUM 8.6; 5 MG/1; MG/1
TABLET, FILM COATED ORAL
Qty: 360 TABLET | Refills: 0 | Status: SHIPPED | OUTPATIENT
Start: 2022-05-16 | End: 2022-05-19

## 2022-05-16 RX ORDER — LEVOTHYROXINE SODIUM 0.15 MG/1
TABLET ORAL
Qty: 90 TABLET | Refills: 0 | OUTPATIENT
Start: 2022-05-16

## 2022-05-17 ENCOUNTER — HOSPITAL ENCOUNTER (OUTPATIENT)
Dept: MRI IMAGING | Age: 52
Discharge: HOME | End: 2022-05-17
Payer: COMMERCIAL

## 2022-05-17 DIAGNOSIS — R51.9 WORSENING HEADACHES: ICD-10-CM

## 2022-05-17 PROCEDURE — 70551 MRI BRAIN STEM W/O DYE: CPT

## 2022-05-17 RX ORDER — SULFASALAZINE 500 MG/1
TABLET ORAL
Qty: 180 TABLET | Refills: 0 | Status: SHIPPED | OUTPATIENT
Start: 2022-05-17 | End: 2022-06-13

## 2022-05-17 RX ORDER — LEFLUNOMIDE 20 MG/1
TABLET ORAL
Qty: 30 TABLET | Refills: 0 | Status: SHIPPED | OUTPATIENT
Start: 2022-05-17 | End: 2022-06-13

## 2022-05-18 DIAGNOSIS — I25.10 CORONARY ARTERY DISEASE INVOLVING NATIVE CORONARY ARTERY OF NATIVE HEART WITHOUT ANGINA PECTORIS: Chronic | ICD-10-CM

## 2022-05-18 DIAGNOSIS — I50.22 SYSTOLIC CHF, CHRONIC (HCC): ICD-10-CM

## 2022-05-18 DIAGNOSIS — I10 ESSENTIAL HYPERTENSION: ICD-10-CM

## 2022-05-18 DIAGNOSIS — R06.02 SOB (SHORTNESS OF BREATH): Chronic | ICD-10-CM

## 2022-05-18 DIAGNOSIS — I50.30 (HFPEF) HEART FAILURE WITH PRESERVED EJECTION FRACTION (HCC): Chronic | ICD-10-CM

## 2022-05-18 DIAGNOSIS — N28.9 RENAL INSUFFICIENCY: Chronic | ICD-10-CM

## 2022-05-18 RX ORDER — SPIRONOLACTONE 50 MG/1
TABLET, FILM COATED ORAL
Qty: 270 TABLET | Refills: 3 | Status: SHIPPED | OUTPATIENT
Start: 2022-05-18 | End: 2022-08-26

## 2022-05-18 NOTE — TELEPHONE ENCOUNTER
Patient  called in stating that wife used to get her labs drawn every other week by [facility] at their home, but they no longer do it. They are are now asking that a standing order be put In so they can come to the lab at the hospital and get them drawn every other week.      can be reached at [phone]

## 2022-05-18 NOTE — TELEPHONE ENCOUNTER
Medication Refill    Medication needing refilled: spironolactone (ALDACTONE)    Dosage of the medication: 50 MG tablet     How are you taking this medication (QD, BID, TID, QID, PRN): TAKE 2 TABLETS BY MOUTH IN THE MORNING AND THEN TAKE 1 TABLET BY MOUTH IN THE EVENING    30 or 90 day supply called in: 270 tablet    When will you run out of your medication:  Currently out    Which Pharmacy are we sending the medication to?:    [address], [address] [place] [place] [address] [phone]   [facility]   Phone:  [phone]  Fax:  [phone]

## 2022-05-18 NOTE — TELEPHONE ENCOUNTER
Prescription refill    Last OV:04/20/2022    Last Refill:10/08/2021 Should standing labs be for BNP and BMP    Labs:04/26/2022    Future Appt:10/03/2022

## 2022-05-19 RX ORDER — SULFASALAZINE 500 MG/1
TABLET ORAL
Qty: 180 TABLET | Refills: 0 | OUTPATIENT
Start: 2022-05-19

## 2022-05-19 RX ORDER — LEFLUNOMIDE 20 MG/1
TABLET ORAL
Qty: 30 TABLET | Refills: 0 | OUTPATIENT
Start: 2022-05-19

## 2022-05-19 RX ORDER — SENNOSIDES AND DOCUSATE SODIUM 8.6; 5 MG/1; MG/1
TABLET, FILM COATED ORAL
Qty: 360 TABLET | Refills: 0 | Status: SHIPPED | OUTPATIENT
Start: 2022-05-19 | End: 2022-05-20

## 2022-05-20 RX ORDER — TORSEMIDE 100 MG/1
TABLET ORAL
Qty: 135 TABLET | Refills: 0 | Status: SHIPPED | OUTPATIENT
Start: 2022-05-20 | End: 2022-08-26

## 2022-05-20 RX ORDER — SENNOSIDES AND DOCUSATE SODIUM 8.6; 5 MG/1; MG/1
TABLET, FILM COATED ORAL
Qty: 360 TABLET | Refills: 0 | Status: SHIPPED | OUTPATIENT
Start: 2022-05-20 | End: 2022-06-01

## 2022-05-23 ENCOUNTER — TELEPHONE (OUTPATIENT)
Dept: INTERNAL MEDICINE CLINIC | Age: 52
End: 2022-05-23

## 2022-05-23 NOTE — TELEPHONE ENCOUNTER
Pts  called in office to get his wife an appointment. They said they didn't have a ride today but could do tomorrow. Dr. Deborah Vega advised them to go to Urgent Care due to her ears bleeding.

## 2022-05-25 ENCOUNTER — TELEPHONE (OUTPATIENT)
Dept: CARDIOLOGY CLINIC | Age: 52
End: 2022-05-25

## 2022-05-25 ENCOUNTER — HOSPITAL ENCOUNTER (OUTPATIENT)
Dept: WOMENS IMAGING | Age: 52
Discharge: HOME | End: 2022-05-25
Payer: COMMERCIAL

## 2022-05-25 DIAGNOSIS — I10 ESSENTIAL HYPERTENSION: ICD-10-CM

## 2022-05-25 DIAGNOSIS — I25.10 CORONARY ARTERY DISEASE INVOLVING NATIVE CORONARY ARTERY OF NATIVE HEART WITHOUT ANGINA PECTORIS: ICD-10-CM

## 2022-05-25 DIAGNOSIS — I50.32 CHRONIC HEART FAILURE WITH PRESERVED EJECTION FRACTION (HCC): ICD-10-CM

## 2022-05-25 DIAGNOSIS — Z12.31 VISIT FOR SCREENING MAMMOGRAM: ICD-10-CM

## 2022-05-25 DIAGNOSIS — N18.4 CKD (CHRONIC KIDNEY DISEASE) STAGE 4, GFR 15-29 ML/MIN (HCC): ICD-10-CM

## 2022-05-25 DIAGNOSIS — R06.02 SOB (SHORTNESS OF BREATH): Primary | ICD-10-CM

## 2022-05-25 PROCEDURE — 77063 BREAST TOMOSYNTHESIS BI: CPT

## 2022-05-25 NOTE — TELEPHONE ENCOUNTER
Patient's  stopped by the office today regarding Jaleelwa receiving a home health aide. Patient's insurance denied coverage for a home health aide to do the blood work every 2 weeks. Patient would like to know if she can come here for labwork and would need an order. Please call and advise. Paperwork received given to Brea.    asmita

## 2022-05-25 NOTE — TELEPHONE ENCOUNTER
Spoke to . ARETHA changing BMP, BNP to once a month. Informed . Insurance needs note from ARETHA saying [facility] was needed on 4 dates. Insurance in denying [facility]. Pt last seen in office on 4/20/22. The dates are:  April 5th, 12th, 19th, 26th. HHC is now stopped. Patient will get monthly labs at [facility] building and  knows where this is. Standing orders placed today 5/25      Will need to look for 's denial paperwork but Mra is gone and its not in Brea yellow folder. Will need follow up.

## 2022-05-25 NOTE — LETTER
[address] Cardiology [facility]  [address] [address]  Phone: [phone]  Fax: [phone]    Ollie Nichols MD        June 1, 2022     Patient: Trupti Zarate Member ID: [id number]   YOB: 1970   Date of Visit: 5/25/2022       To Whom It May Concern:    I am writing this Appeal on behalf of my patient, Orin Pacheco  Claim#: [id number]  Provider: [address] [place] [address], has chronic Diastolic Heart Failure, Coronary Artery Disease with cardiac cath 2014 showing diffuse LAD disease and small vessel disease with normal RCA and LCX being treated medically.  She is on very High Dose Diuretic Therapy of Spironolactone 150mg a day, Torsemide 150mg daily and Metolazone 2.5mg twice a week. With this regimen to keep her out of Acute heart failure, she has required [facility] with Nursing assessment for signs and symptoms of acute changes with frequent calls to the heart failure Cardiology Office for medication changes in dosages with lab assessment. I believe all of these efforts in heart failure Care Management have prevented hospital admissions for Orin Pacheco over a very long period of time. It is my medical opinion that the [facility] [place] April 5, April 12, April 19 and April 26, 2022 were justified and warranted and medically necessary for her heart failure diagnosis. If you have any questions or concerns, please don't hesitate to call.   Sincerely,        Ollie Nichols MD

## 2022-06-01 RX ORDER — SENNOSIDES AND DOCUSATE SODIUM 8.6; 5 MG/1; MG/1
TABLET, FILM COATED ORAL
Qty: 360 TABLET | Refills: 0 | Status: SHIPPED | OUTPATIENT
Start: 2022-06-01 | End: 2022-09-06

## 2022-06-01 NOTE — TELEPHONE ENCOUNTER
Creating Appeal Letter per [address]. Appeal letter to be faxed to [phone]  Also can be mailed to:  Gal, [address]  ATTN: Ho/. Reference for this call was [id number]. Caller rep from [facility] was [facility] [employer]. Letter in ARETHA folder.

## 2022-06-01 NOTE — TELEPHONE ENCOUNTER
Medication-ranolazine (RANEXA) 500 MG extended release tablet [[id number]]       Pharmacy-[id number] - [address]   [facility], [address] [place]   Phone:  [phone]  Fax:  [phone]    LOV-4/20/22  Labs-5/25/22  Refill-3/22/22  NOV-10/3/22

## 2022-06-06 RX ORDER — RANOLAZINE 500 MG/1
TABLET, EXTENDED RELEASE ORAL
Qty: 180 TABLET | Refills: 3 | Status: SHIPPED | OUTPATIENT
Start: 2022-06-06

## 2022-06-13 DIAGNOSIS — D50.9 IRON DEFICIENCY ANEMIA, UNSPECIFIED IRON DEFICIENCY ANEMIA TYPE: ICD-10-CM

## 2022-06-13 DIAGNOSIS — E03.2 HYPOTHYROIDISM DUE TO MEDICATION: ICD-10-CM

## 2022-06-13 RX ORDER — ERTUGLIFLOZIN 5 MG/1
TABLET, FILM COATED ORAL
Qty: 90 TABLET | Refills: 1 | Status: SHIPPED | OUTPATIENT
Start: 2022-06-13 | End: 2022-07-13

## 2022-06-13 RX ORDER — FERROUS SULFATE 325(65) MG
TABLET ORAL
Qty: 180 TABLET | Refills: 2 | Status: SHIPPED | OUTPATIENT
Start: 2022-06-13 | End: 2022-06-23

## 2022-06-13 RX ORDER — LEVOTHYROXINE SODIUM 0.15 MG/1
TABLET ORAL
Qty: 90 TABLET | Refills: 0 | OUTPATIENT
Start: 2022-06-13

## 2022-06-13 RX ORDER — LEFLUNOMIDE 20 MG/1
TABLET ORAL
Qty: 30 TABLET | Refills: 0 | Status: SHIPPED | OUTPATIENT
Start: 2022-06-13 | End: 2022-06-14 | Stop reason: SDUPTHER

## 2022-06-13 RX ORDER — SULFASALAZINE 500 MG/1
TABLET ORAL
Qty: 180 TABLET | Refills: 0 | Status: SHIPPED | OUTPATIENT
Start: 2022-06-13 | End: 2022-06-14 | Stop reason: SDUPTHER

## 2022-06-13 RX ORDER — ERTUGLIFLOZIN 5 MG/1
TABLET, FILM COATED ORAL
Qty: 90 TABLET | Refills: 1 | Status: SHIPPED | OUTPATIENT
Start: 2022-06-13 | End: 2022-06-13

## 2022-06-13 RX ORDER — TORSEMIDE 100 MG/1
TABLET ORAL
Qty: 135 TABLET | Refills: 0 | OUTPATIENT
Start: 2022-06-13

## 2022-06-13 RX ORDER — LEVOTHYROXINE SODIUM 0.15 MG/1
TABLET ORAL
Qty: 90 TABLET | Refills: 0 | Status: SHIPPED | OUTPATIENT
Start: 2022-06-13 | End: 2022-07-27 | Stop reason: SDUPTHER

## 2022-06-13 NOTE — TELEPHONE ENCOUNTER
Requested Prescriptions     Pending Prescriptions Disp Refills    FEROSUL 325 (65 Fe) MG tablet [Pharmacy Med Name: FeroSul Oral Tablet 325 (65 Fe) MG] 180 tablet 0     Sig: TAKE 1 TABLET BY MOUTH TWO TIMES A DAY WITH MEALS     Last OV - 4/26/22  Next OV - 6/21/22  Last labs - 4/26/22

## 2022-06-13 NOTE — TELEPHONE ENCOUNTER
After Visit Summary   9/19/2018    Tim Concepcion    MRN: [id number]           Patient Information     Date Of Birth          1956        Visit Information        Provider Department      9/19/2018 1:20 PM Aleks Babcock MD [facility]        Today's Diagnoses     Closed head injury, initial encounter    -  1    Testicular hypofunction        Atherosclerosis of coronary artery of native heart without angina pectoris, unspecified vessel or lesion type           Follow-ups after your visit        Additional Services     NEUROLOGY ADULT REFERRAL       Your provider has referred you for the following:   Consult at [facility]: [facility] Jacqui Hearn [phone]   [email or web address]    Please be aware that coverage of these services is subject to the terms and limitations of your health insurance plan.  Call member services at your health plan with any benefit or coverage questions.      Please bring the following with you to your appointment:    (1) Any X-Rays, CTs or MRIs which have been performed.  Contact the facility where they were done to arrange for  prior to your scheduled appointment.    (2) List of current medications  (3) This referral request   (4) Any documents/labs given to you for this referral                  Your next 10 appointments already scheduled     Sep 25, 2018 11:30 AM CDT   Anticoagulation Visit with  ANTICOAGULATION CLINIC   [facility] ([facility]    [address] [address] [phone]              Future tests that were ordered for you today     Open Future Orders        Priority Expected Expires Ordered    **Testosterone Free and Total FUTURE anytime Routine 9/19/2018 9/19/2019 9/19/2018            Who to contact     If you have questions or need follow up information about today's clinic visit or your schedule please contact  Script sent in 5/22 "[facility] directly at [phone].  Normal or non-critical lab and imaging results will be communicated to you by MyChart, letter or phone within 4 business days after the clinic has received the results. If you do not hear from us within 7 days, please contact the clinic through MyChart or phone. If you have a critical or abnormal lab result, we will notify you by phone as soon as possible.  Submit refill requests through [employer] or call your pharmacy and they will forward the refill request to us. Please allow 3 business days for your refill to be completed.          Additional Information About Your Visit        Daily [employer] Information     [employer] lets you send messages to your doctor, view your test results, renew your prescriptions, schedule appointments and more. To sign up, go to [email or web address] . Click on \"Log in\" on the left side of the screen, which will take you to the Welcome page. Then click on \"Sign up Now\" on the right side of the page.     You will be asked to enter the access code listed below, as well as some personal information. Please follow the directions to create your username and password.     Your access code is: [email or web address]  Expires: 10/31/2018  9:12 AM     Your access code will  in 90 days. If you need help or a new code, please call your [place] clinic or [phone].        Care EveryWhere ID     This is your Care EveryWhere ID. This could be used by other organizations to access your [place] medical records  [id number]        Your Vitals Were     Pulse Temperature Pulse Oximetry BMI (Body Mass Index)          79 98.1  F (36.7  C) (Oral) 97% 26.73 kg/m2         Blood Pressure from Last 3 Encounters:   18 118/64   18 147/89   18 104/59    Weight from Last 3 Encounters:   18 181 lb (82.1 kg)   18 176 lb 9.4 oz (80.1 kg)   18 179 lb (81.2 kg)              We Performed the Following     NEUROLOGY ADULT REFERRAL        "   Today's Medication Changes          These changes are accurate as of 9/19/18  2:10 PM.  If you have any questions, ask your nurse or doctor.               Start taking these medicines.        Dose/Directions    nebivolol 10 MG tablet   Commonly known as:  BYSTOLIC   Used for:  Atherosclerosis of coronary artery of native heart without angina pectoris, unspecified vessel or lesion type   Started by:  Aleks Babcock MD        Dose:  10 mg   Take 1 tablet (10 mg) by mouth daily   Quantity:  90 tablet   Refills:  11         Stop taking these medicines if you haven't already. Please contact your care team if you have questions.     metoprolol tartrate 50 MG tablet   Commonly known as:  LOPRESSOR   Stopped by:  Aleks Babcock MD           sildenafil 20 MG tablet   Commonly known as:  REVATIO   Stopped by:  Aleks Babcock MD           tadalafil 20 MG tablet   Commonly known as:  CIALIS   Stopped by:  Aleks Babcock MD                Where to get your medicines      These medications were sent to [address] [address]     Phone:  [phone]     nebivolol 10 MG tablet                Primary Care Provider Office Phone # Fax #    Guilherme GIANCARLO Arnett [phone] [address] [address]        Equal Access to Services     [facility] AH: Hadii aad ku hadasho Soomaali, waaxda luqadaha, qaybta kaalmada adeegyada, waxlivier vasquez haymichelle scott. So [facility] [phone].    ATENCIÓN: Si habla español, tiene a lopez disposición servicios gratuitos de asistencia lingüística. Llame al [phone].    We comply with applicable federal civil rights laws and [place] laws. We do not discriminate on the basis of race, color, national origin, age, disability, sex, sexual orientation, or gender identity.            Thank you!     Thank you for choosing [facility]  for your care. Our goal  is always to provide you with excellent care. Hearing back from our patients is one way we can continue to improve our services. Please take a few minutes to complete the written survey that you may receive in the mail after your visit with us. Thank you!             Your Updated Medication List - Protect others around you: Learn how to safely use, store and throw away your medicines at www.disposemymeds.org.          This list is accurate as of 9/19/18  2:10 PM.  Always use your most recent med list.                   Brand Name Dispense Instructions for use Diagnosis    aspirin 81 MG tablet      Take 81 mg by mouth daily        BENADRYL 25 MG tablet   Generic drug:  diphenhydrAMINE      Take 25 mg by mouth At Bedtime        LORazepam 0.5 MG tablet    ATIVAN    60 tablet    TAKE 1/2 TO 1 TABLET BY MOUTH TWICE DAILY AS NEEDED FOR ANXIOUSNESS OR FRETTING AND 1 TABLET AT BEDTIME AS NEEDED    Adjustment disorder with depressed mood       multivitamin, therapeutic Tabs tablet      Take 1 tablet by mouth daily        nebivolol 10 MG tablet    BYSTOLIC    90 tablet    Take 1 tablet (10 mg) by mouth daily    Atherosclerosis of coronary artery of native heart without angina pectoris, unspecified vessel or lesion type       rosuvastatin 40 MG tablet    CRESTOR    90 tablet    Take 1 tablet (40 mg) by mouth daily    Atrial fibrillation (H)       VITAMIN D (CHOLECALCIFEROL) PO      Take 1 tablet by mouth daily        VITAMIN E NATURAL PO      Take 1 tablet by mouth daily        * WARFARIN SODIUM PO      Take 3.75 mg by mouth once a week On Fridays        * WARFARIN SODIUM PO      Take 7.5 mg by mouth See Admin Instructions Every day of the week except for Fridays        * Notice:  This list has 2 medication(s) that are the same as other medications prescribed for you. Read the directions carefully, and ask your doctor or other care provider to review them with you.

## 2022-06-14 ENCOUNTER — OFFICE VISIT (OUTPATIENT)
Dept: RHEUMATOLOGY | Age: 52
End: 2022-06-14
Payer: COMMERCIAL

## 2022-06-14 VITALS — WEIGHT: 187.2 LBS | SYSTOLIC BLOOD PRESSURE: 120 MMHG | BODY MASS INDEX: 32.13 KG/M2 | DIASTOLIC BLOOD PRESSURE: 64 MMHG

## 2022-06-14 DIAGNOSIS — M50.30 DDD (DEGENERATIVE DISC DISEASE), CERVICAL: ICD-10-CM

## 2022-06-14 DIAGNOSIS — M1A.09X0 IDIOPATHIC CHRONIC GOUT OF MULTIPLE SITES WITHOUT TOPHUS: ICD-10-CM

## 2022-06-14 DIAGNOSIS — M15.9 PRIMARY OSTEOARTHRITIS INVOLVING MULTIPLE JOINTS: ICD-10-CM

## 2022-06-14 DIAGNOSIS — M51.36 DDD (DEGENERATIVE DISC DISEASE), LUMBAR: ICD-10-CM

## 2022-06-14 DIAGNOSIS — M06.00 SERONEGATIVE RHEUMATOID ARTHRITIS (HCC): Primary | ICD-10-CM

## 2022-06-14 DIAGNOSIS — R76.8 POSITIVE ANA (ANTINUCLEAR ANTIBODY): ICD-10-CM

## 2022-06-14 DIAGNOSIS — Z79.899 HIGH RISK MEDICATION USE: ICD-10-CM

## 2022-06-14 LAB
ALT SERPL-CCNC: 14 U/L (ref 10–40)
AST SERPL-CCNC: 14 U/L (ref 15–37)
BASOPHILS ABSOLUTE: 0 K/UL (ref 0–0.2)
BASOPHILS RELATIVE PERCENT: 0.5 %
CREAT SERPL-MCNC: 1.3 MG/DL (ref 0.6–1.1)
EOSINOPHILS ABSOLUTE: 0.1 K/UL (ref 0–0.6)
EOSINOPHILS RELATIVE PERCENT: 1.2 %
GFR AFRICAN AMERICAN: 52
GFR NON-AFRICAN AMERICAN: 43
HCT VFR BLD CALC: 39.2 % (ref 36–48)
HEMOGLOBIN: 12.9 G/DL (ref 12–16)
LYMPHOCYTES ABSOLUTE: 1.9 K/UL (ref 1–5.1)
LYMPHOCYTES RELATIVE PERCENT: 25.9 %
MCH RBC QN AUTO: 28.5 PG (ref 26–34)
MCHC RBC AUTO-ENTMCNC: 33 G/DL (ref 31–36)
MCV RBC AUTO: 86.4 FL (ref 80–100)
MONOCYTES ABSOLUTE: 0.6 K/UL (ref 0–1.3)
MONOCYTES RELATIVE PERCENT: 8.1 %
NEUTROPHILS ABSOLUTE: 4.7 K/UL (ref 1.7–7.7)
NEUTROPHILS RELATIVE PERCENT: 64.3 %
PDW BLD-RTO: 13.8 % (ref 12.4–15.4)
PLATELET # BLD: 290 K/UL (ref 135–450)
PMV BLD AUTO: 9.4 FL (ref 5–10.5)
RBC # BLD: 4.54 M/UL (ref 4–5.2)
URIC ACID, SERUM: 3.7 MG/DL (ref 2.6–6)
WBC # BLD: 7.4 K/UL (ref 4–11)

## 2022-06-14 PROCEDURE — 3017F COLORECTAL CA SCREEN DOC REV: CPT | Performed by: INTERNAL MEDICINE

## 2022-06-14 PROCEDURE — 1036F TOBACCO NON-USER: CPT | Performed by: INTERNAL MEDICINE

## 2022-06-14 PROCEDURE — 99214 OFFICE O/P EST MOD 30 MIN: CPT | Performed by: INTERNAL MEDICINE

## 2022-06-14 PROCEDURE — G8427 DOCREV CUR MEDS BY ELIG CLIN: HCPCS | Performed by: INTERNAL MEDICINE

## 2022-06-14 PROCEDURE — G8417 CALC BMI ABV UP PARAM F/U: HCPCS | Performed by: INTERNAL MEDICINE

## 2022-06-14 RX ORDER — FEBUXOSTAT 80 MG/1
80 TABLET, FILM COATED ORAL DAILY
Qty: 90 TABLET | Refills: 1 | Status: SHIPPED | OUTPATIENT
Start: 2022-06-14 | End: 2022-07-06

## 2022-06-14 RX ORDER — SULFASALAZINE 500 MG/1
TABLET ORAL
Qty: 180 TABLET | Refills: 0 | Status: SHIPPED | OUTPATIENT
Start: 2022-06-14 | End: 2022-09-14 | Stop reason: SDUPTHER

## 2022-06-14 RX ORDER — LEFLUNOMIDE 20 MG/1
TABLET ORAL
Qty: 90 TABLET | Refills: 0 | Status: SHIPPED | OUTPATIENT
Start: 2022-06-14 | End: 2022-09-14 | Stop reason: SDUPTHER

## 2022-06-14 RX ORDER — PREGABALIN 75 MG/1
CAPSULE ORAL
Qty: 60 CAPSULE | Refills: 2 | Status: SHIPPED | OUTPATIENT
Start: 2022-06-14 | End: 2022-09-14

## 2022-06-14 RX ORDER — FOLIC ACID 1 MG/1
1 TABLET ORAL DAILY
Qty: 90 TABLET | Refills: 1 | Status: CANCELLED | OUTPATIENT
Start: 2022-06-14

## 2022-06-14 RX ORDER — COLCHICINE 0.6 MG/1
0.6 TABLET ORAL
Qty: 45 TABLET | Refills: 1 | Status: SHIPPED | OUTPATIENT
Start: 2022-06-14 | End: 2022-10-03

## 2022-06-14 NOTE — PROGRESS NOTES
2022  Patient Name: Faustino Farfan  : 1970  Medical Record: [id number]      ASSESSMENT AND PLAN    Assessment/Plan:      ASSESSMENT:    1. Seronegative rheumatoid arthritis ([facility]    2. DDD (degenerative disc disease), lumbar    3. High risk medication use    4. Idiopathic chronic gout of multiple sites without tophus    5. Positive BRENNA (antinuclear antibody)    6. Primary osteoarthritis involving multiple joints    7. DDD (degenerative disc disease), cervical        PLAN:     Crow was seen today for follow-up. Diagnoses and all orders for this visit:    Seronegative rheumatoid arthritis ([facility]  -     leflunomide (ARAVA) 20 MG tablet; TAKE 1 TABLET BY MOUTH EVERY DAY  -     sulfaSALAzine (AZULFIDINE) 500 MG tablet; Take 1 tablet by mouth two times daily    DDD (degenerative disc disease), lumbar  -     pregabalin (LYRICA) 75 MG capsule; Take 1 cap twice a day    High risk medication use  -     Creatinine  -     CBC with Auto Differential  -     AST  -     ALT    Idiopathic chronic gout of multiple sites without tophus  -     Febuxostat 80 MG TABS; Take 80 mg by mouth daily  -     Uric Acid; Future  -     colchicine (COLCRYS) 0.6 MG tablet; Take 1 tablet by mouth every 48 hours    Positive BRENNA (antinuclear antibody)    Primary osteoarthritis involving multiple joints    DDD (degenerative disc disease), cervical    Seronegative rheumatoid arthritis-diagnosed more than 10 years ago. RF, CCP negative. HLA-B27, hepatitis panel negative. Hand x-rays with osteoarthritis/degenerative changes  Continues to be symptomatic  I did not appreciate any synovitis on joint exam  Hand swelling most likely multifactorial [fluid retention, diabetes, medication side effects]  She will continue sulfasalazine 500 mg twice a day   Continue leflunomide 20 mg daily  Previous history of methotrexate [hair loss] and Plaquenil [loss of efficacy] use.       Gout-continues to have more frequent flareups of gout in the left ankle since she has been off of colchicine. Last uric acid was 6.4. I will repeat uric acid and restart colchicine 0.6 mg every other day. She will continue Uloric 80 mg daily. Degenerative disc disease in the lumbar and cervical spine-   Lumba  and cervical spine with multilevel degenerative changes   advised to continue follow-up with a spine specialist/pain specialist.  Follows pain specialist.    Continue Lyrica 75 mg twice a day  Percocet and oxycodone prescribed by pain specialist    Low titer positive BRENNA-1: 80 speckled pattern  Implications of low titer positive BRENNA were discussed with patient. About 15-20% of normal healthy individuals at her age may have low titer positive BRENNA of unclear clinical significance. dsDNA, anti-Piedra, RNP, SSA/SSB is negative      The patient indicates understanding of these issues and agrees with the plan. Return in about 3 months (around 9/14/2022). The risks and benefits of my recommendations, as well as other treatment options, benefits and side effects werediscussed with the patient. All questions were answered.          MEDICATIONS  Current Outpatient Medications   Medication Sig Dispense Refill    Febuxostat 80 MG TABS Take 80 mg by mouth daily 90 tablet 1    leflunomide (ARAVA) 20 MG tablet TAKE 1 TABLET BY MOUTH EVERY DAY 90 tablet 0    pregabalin (LYRICA) 75 MG capsule Take 1 cap twice a day 60 capsule 2    sulfaSALAzine (AZULFIDINE) 500 MG tablet Take 1 tablet by mouth two times daily 180 tablet 0    colchicine (COLCRYS) 0.6 MG tablet Take 1 tablet by mouth every 48 hours 45 tablet 1    FEROSUL 325 (65 Fe) MG tablet TAKE 1 TABLET BY MOUTH TWO TIMES A DAY WITH MEALS 180 tablet 2    STEGLATRO 5 MG TABS TAKE 1 TABLET BY MOUTH EVERY DAY 90 tablet 1    levothyroxine (SYNTHROID) 150 MCG tablet TAKE 1 TABLET BY MOUTH IN THE MORNING before breakfast 90 tablet 0    ranolazine (RANEXA) 500 MG extended release tablet TAKE 1 TABLET BY MOUTH TWO TIMES A  tablet 3    SM SENNA-S 8.6-50 MG per tablet TAKE 2 TABLETS BY MOUTH TWO TIMES A  tablet 0    torsemide (DEMADEX) 100 MG tablet TAKE 1 TABLET BY MOUTH IN THE MORNING AND 1/2 TABLET IN THE EVENING. 135 tablet 0    spironolactone (ALDACTONE) 50 MG tablet TAKE 2 TABLETS BY MOUTH IN THE MORNING AND THEN TAKE 1 TABLET BY MOUTH IN THE EVENING 270 tablet 3    montelukast (SINGULAIR) 10 MG tablet take 1 tablet by mouth nightly 30 tablet 0    Urea 40 % LOTN Apply to feet nightly and cover with Aquaphor 325 mL 5    mineral oil-hydrophilic petrolatum (HYDROPHOR) ointment Apply topically as needed to hands and feet. 454 g 5    lubiprostone (AMITIZA) 24 MCG capsule TAKE 1 CAPSULE BY MOUTH TWO TIMES A DAY WITH MEALS 60 capsule 0    Compression Stockings MISC by Does not apply route Zippered stockings for daily use on lower extremities (do not wear at night). 20-30mmHg. 1 each 1    ONETOUCH VERIO strip use to test blood sugar 5 times daily 200 strip 5    insulin aspart (NOVOLOG FLEXPEN) 100 UNIT/ML injection pen inject 30 to 50 units into the skin three time daily before meals 60 mL 3    metOLazone (ZAROXOLYN) 2.5 MG tablet TAKE 1 TABLET BY MOUTH TWO TIMES A WEEK AS NEEDED FOR WEIGHT OVER 180 POUNDS.  DO NOT TAKE 2 DAYS IN A ROW. (Patient taking differently: Take 2.5 mg by mouth twice a week ) 24 tablet 0    Continuous Blood Gluc Sensor (FREESTYLE EMERALD 2 SENSOR) MISC Change every 14 days 2 each 5    Continuous Blood Gluc  (FREESTYLE EMERALD 2 READER) MINDY To check glucose levels 1 each 0    omeprazole (PRILOSEC) 20 MG delayed release capsule TAKE 1 CAPSULE BY MOUTH TWO TIMES A DAY 60 capsule 5    simvastatin (ZOCOR) 20 MG tablet TAKE 1 TABLET BY MOUTH EVERY DAY AT NIGHT 90 tablet 0    metoprolol tartrate (LOPRESSOR) 25 MG tablet TAKE 1 TABLET BY MOUTH TWO TIMES A  tablet 0    TRESIBA FLEXTOUCH 200 UNIT/ML SOPN INJECT 200 UNITS SUBCUTANEOUSLY ONCE DAILY 20 pen 1    albuterol (PROVENTIL) (2.5 MG/3ML) 0.083% nebulizer solution Take 3 mLs by nebulization every 6 hours as needed for Wheezing or Shortness of Breath 120 each 1    insulin aspart (NOVOLOG) 100 UNIT/ML injection vial Use up to 30 units with each meal 3 each 3    polyethylene glycol (GLYCOLAX) 17 GM/SCOOP powder TAKE 17 GRAMS (1 CAPFUL) BY MOUTH NIGHTLY 510 g 0    LINZESS 290 MCG CAPS capsule TAKE 1 CAPSULE BY MOUTH ONE TIME A DAY FOR 90 DAYS      REXULTI 2 MG TABS tablet Take 2 mg by mouth at bedtime       methocarbamol (ROBAXIN-750) 750 MG tablet Take 1 tablet by mouth 3 times daily as needed (pain and spasm) 30 tablet 0    nitroGLYCERIN (NITROSTAT) 0.4 MG SL tablet PLACE ONE TABLET UNDER TONGUE AS NEEDED FOR CHEST PAIN, MAY REPEAT EVERY 5 MINUTES AS NEEDED UP TO A MAX OF 3 TABLETS. IF NO RELIEF AFTER 1 DOSE, CALL 911. 25 tablet 0    Blood Glucose Monitoring Suppl (ONETOUCH VERIO) w/Device KIT Use to check glucose 4 times daily. 1 kit 0    Insulin Pen Needle (B-D UF III MINI PEN NEEDLES) 31G X 5 MM MISC use five times daily with insulin 200 each 5    vitamin D3 (CHOLECALCIFEROL) 25 MCG (1000 UT) TABS tablet TAKE 3 TABLETS BY MOUTH ONE TIME A DAY 90 tablet 5    nystatin (MYCOSTATIN) 458703 UNIT/ML suspension Take 5 mLs by mouth 4 times daily 500 mL 1    Magic Mouthwash (MIRACLE MOUTHWASH) Swish and spit 5 mLs 4 times daily as needed for Irritation or Pain 300 mL 2    traZODone (DESYREL) 50 MG tablet Take 1 tablet by mouth nightly Take it on the day of sleep study 1 tablet 0    loratadine (CLARITIN) 10 MG tablet TAKE 1 TABLET BY MOUTH ONE TIME A DAY  30 tablet 5    ketoconazole (NIZORAL) 2 % shampoo Apply topically daily as needed. 120 mL 1    diazePAM (VALIUM) 5 MG tablet Take 5 mg by mouth 2 times daily as needed for Anxiety.  OXYGEN Inhale 2 L into the lungs nightly Sometimes with activity      oxyCODONE (OXYCONTIN) 20 MG extended release tablet Take 20 mg by mouth every 12 hours.       aspirin 81 MG EC tablet Take 81 mg by mouth daily      benztropine (COGENTIN) 1 MG tablet Take 1 mg by mouth nightly       folic acid (FOLVITE) 1 MG tablet Take 1 mg by mouth daily      oxyCODONE-acetaminophen (PERCOCET)  MG per tablet Take 1 tablet by mouth every 4 hours as needed for Pain . Earliest Fill Date: 6/8/17 100 tablet 0    duloxetine (CYMBALTA) 60 MG capsule Take 60 mg by mouth 2 times daily. No current facility-administered medications for this visit. ALLERGIES  Allergies   Allergen Reactions    Iv Dye [Iodides] Anaphylaxis     allergic to ct scan dye, not the MRI    Diazepam Other (See Comments)    Insulin Glargine Other (See Comments)     High blood sugar     Trazodone And Nefazodone Other (See Comments)     Makes patient feel very sluggish         Comments  No specialty comments available. Background history:  Lamar García is a 47 y.o. female with past medical history of hypertension, diabetes, congestive heart failure, COPD, depression, hypothyroidism, chronic kidney disease, degenerative disc disease in the lumbar spine, breast cancer status post chemoradiation, seronegative rheumatoid arthritis, gout who is being seen for follow up evaluation of  seronegative rheumatoid arthritis and gout. She was seeing Dr. Walter Ramírez  at [facility].  She was last seen in January 2021. She was diagnosed with seronegative rheumatoid arthritis several years ago. She was on methotrexate and Plaquenil in the past which was stopped due to lack of efficacy. She is now on leflunomide 10 mg daily and sulfasalazine 500 mg twice a day. She was on sulfasalazine 1000 mg twice a day which was slowly tapered down to 500 mg twice a day since it was not helping much. She continues to have pain in the joints especially with weather changes. She has swelling in the hands. She has stiffness in the joints that can last all day long.   She denies psoriasis, inflammatory bowel disease, inflammatory back pain, dactylitis, enthesitis, tenosynovitis or uveitis. She denies fever, weight loss or swollen glands. She denies family history of rheumatoid arthritis. She has degenerative disc disease in the lumbar and cervical spine. She has low chronic low back pain. Back pain gets worse with activity and improves with rest.  Back pain radiates to the left lower extremity. She denies bowel or bladder dysfunction, saddle anesthesia. She sees a pain specialist and receives epidural spinal injections. She also has a gout which was diagnosed 2 years ago. She is on Uloric 80 mg daily and colchicine 0.6 mg daily. She is unable to go off of colchicine due to frequent flareups. She has lymphedema of the left arm after she underwent breast surgery with lymph node resection and chemoradiation. She has been on tamoxifen for several years. She is off of tamoxifen for past 2 years. She has not had any recurrence of breast cancer. Data reviewed: Reviewed notes by Dr. Aaliyah Meza from January 2021 as mentioned in HPI. Chronic rheumatoid arthritis, gout, lymphedema, CKD. Taper of sulfasalazine and continue Arava 10 mg daily. Continue Uloric 80 mg daily. Interim history: She presents for follow-up of seronegative rheumatoid arthritis, osteoarthritis, gout, chronic pain. She is on leflunomide 20 mg daily and sulfasalazine 500 mg twice a day. She continues complain of pain, swelling and stiffness in the joints. Symptoms get worse with weather changes. She has swelling in the hands. Stiffness can last all day long. She also has chronic neck and low back pain. She sees a pain specialist.  She has degenerative disc disease in the lumbar and cervical spine. She has received multiple epidural spinal injections in the past.  She also has gout. She is having more frequent flareups in the left ankle lasting for 2 to 3 days at a time since she has been off of colchicine. She is on Uloric 80 mg daily.   Last uric acid was 6.4.  She denies any side effects with leflunomide and sulfasalazine. She denies any recent fevers or infections. HPI  Review of Systems    REVIEW OF SYSTEMS: Positive for shortness of breath, wheezing, renal disease,, anxiety, depression, neuropathies, paresthesias  Constitutional: No unanticipated weight loss or fevers. Integumentary: No rash, photosensitivity, malar rash, livedo reticularis, alopecia and Raynaud's symptoms, sclerodactyly, skin tightening  Eyes: negative for visual disturbance and persistent redness, discharge from eyes   ENT: - No tinnitus, loss of hearing, vertigo, or recurrent ear infections.  - No history of nasal/oral ulcers. - No history of dry eyes/dry mouth  Cardiovascular: No history of pericarditis, chest pain or murmur or palpitations  Respiratory: No cough or history of interstitial lung disease. No history of pleurisy. No history of tuberculosis or atypical infections. Gastrointestinal: No history of heart burn, dysphagia or esophageal dysmotility. No change in bowel habits or any inflammatory bowel disease. Genitourinary: No history of miscarriages. Hematologic/Lymphatic: No abnormal bruising or bleeding, blood clots or swollen lymph nodes. Neurological: No history of headaches, seizure or focal weakness. No history of  hyperesthesias, facial droop, diplopia  Psychiatric: No history of bipolar disease  Endocrine: Denies any polyuria, polydipsia and osteoporosis  Allergic/Immunologic: No nasal congestion or hives. I have reviewed patients Past medical History, Social History and Family History as mentioned in her chart and this remains unchanged fromprevious.     Past Medical History:   Diagnosis Date    Anxiety     Anxiety and depression     Arthritis     not sure of specific type    Asthma     CAD (coronary artery disease)     Cancer ([facility] 2007    left breast    Cerebral artery occlusion with cerebral infarction ([facility]     2000, 2001    CHF (congestive heart failure) [facility]) 2018    Chronic kidney disease     renal insufficency/to see Dr Isis Ferguson    Chronic pain     Constipation     COPD [facility]ronic obstructive pulmonary disease) [facility]     Depression     Diabetes mellitus ([facility]     Diabetic polyneuropathy associated with type 2 diabetes mellitus ([facility] 2/2/2018    Dysthymia 2/2/2018    ESBL (extended spectrum beta-lactamase) producing bacteria infection 03/14/2018    urine    Fibromyalgia     Gastric ulcer, unspecified as acute or chronic, without mention of hemorrhage, perforation, or obstruction     GERD (gastroesophageal reflux disease)     Gout     HIGH CHOLESTEROL     Hypertension     Hypothyroidism     Severe persistent asthma without complication 5/2/6900    Thyroid disease     TIA (transient ischemic attack)     with occasional left leg and hand weakness     Past Surgical History:   Procedure Laterality Date    BREAST SURGERY      left mastectomy    CARPAL TUNNEL RELEASE      Bilateral    FINGER CONTRACTURE SURGERY      HYSTERECTOMY (CERVIX STATUS UNKNOWN)  2005    USO    MASTECTOMY      TONSILLECTOMY      UPPER GASTROINTESTINAL ENDOSCOPY  2019    stretched esophagous      Social History     Socioeconomic History    Marital status:      Spouse name: Maki Vick    Number of children: 3    Years of education: Not on file    Highest education level: Not on file   Occupational History    Occupation: disabled   Tobacco Use    Smoking status: Never Smoker    Smokeless tobacco: Never Used   Vaping Use    Vaping Use: Never used   Substance and Sexual Activity    Alcohol use: No    Drug use: No    Sexual activity: Not Currently   Other Topics Concern    Not on file   Social History Narrative    Not on file     Social Determinants of Health     Financial Resource Strain: Low Risk     Difficulty of Paying Living Expenses: Not hard at all   Food Insecurity: No Food Insecurity    Worried About Running Out of Food in the Last Year: Never true    Ran Out of Food in the Last Year: Never true   Transportation Needs:     Lack of Transportation (Medical): Not on file    Lack of Transportation (Non-Medical):  Not on file   Physical Activity:     Days of Exercise per Week: Not on file    Minutes of Exercise per Session: Not on file   Stress:     Feeling of Stress : Not on file   Social Connections:     Frequency of Communication with Friends and Family: Not on file    Frequency of Social Gatherings with Friends and Family: Not on file    Attends Sikh Services: Not on file    Active Member of Clubs or Organizations: Not on file    Attends Club or Organization Meetings: Not on file    Marital Status: Not on file   Intimate Partner Violence:     Fear of Current or Ex-Partner: Not on file    Emotionally Abused: Not on file    Physically Abused: Not on file    Sexually Abused: Not on file   Housing Stability:     Unable to Pay for Housing in the Last Year: Not on file    Number of [place] in the Last Year: Not on file    Unstable Housing in the Last Year: Not on file     Family History   Problem Relation Age of Onset    Asthma Other     Cancer Other     Depression Other     Diabetes Other     Hypertension Other     High Cholesterol Other     Migraines Other     Heart Attack Father 72         of MI    High Blood Pressure Mother     Diabetes Mother          PHYSICAL EXAM   Vitals:    22 1428   BP: 120/64   Site: Right Upper Arm   Position: Sitting   Cuff Size: Large Adult   Weight: 187 lb 3.2 oz (84.9 kg)     Physical Exam  Constitutional:  Well developed, well nourished, no acute distress, non-toxic appearance   Musculoskeletal:    Ambulates without assistance, normal gait  Neck: Decreased range of motion, tenderness to palpation +  RIGHT  Swell  Tender  ROM  LEFT  Swell  Tender  ROM    DIP2  0  0   [place]  0  0   [place]   DIP3  0  0   [place]  0  0   [place]   DIP4  0  0   [place]  0  0 Heberden   DIP5  0  0   Heberden  0  0   [place]den   PIP1  0  0  FULL   0  0  FULL    PIP2  0 0 FULL   0  0  FULL    PIP3  0 0 FULL   0  0  FULL    PIP4  0 0 FULL   0  0  FULL    PIP5  0 0 FULL   0  0  FULL    MCP1  0 0 FULL   0  0  FULL    MCP2  0 0 FULL   0  0  FULL    MCP3  0 0 FULL   0  0  FULL    MCP4  0 0 FULL   0  0  FULL    MCP5  0 0 FULL   0  0  FULL    Wrist  0  0  FULL   0  0  FULL    Elbow  0  0  FULL   0  0  FULL    Shouldr  0  0  decr  0  0  decr   Hip  0  0  decr  0  0  decr   Knee  0  0   crepitus  0  0   crepitus   Ankle  0  0  FULL   0  0  FULL    MTP1  0  0  FULL   0  0  FULL    MTP2  0  0  FULL   0  0  FULL    MTP3  0  0  FULL   0  0  FULL    MTP4  0  0  FULL   0  0  FULL    MTP5  0  0  FULL   0  0  FULL    IP1  0  0  FULL   0  0  FULL    IP2  0  0  FULL   0  0  FULL    IP3  0  0  FULL   0  0  FULL    IP4  0  0  FULL   0  0  FULL    IP5  0  0  FULL   0 0 FULL     Lymphedema of the left hand                                            Right            Left                                   Tender Tender    Costochondral  + +   Low cervical + +   suboccipital + +   Trapezius  + +   Supraspinatus  + +   Lateral epicondyl + +   Gluteal + +   Greater trochanter  + +   knees + +     Back-tenderness to palpation bilateral lumbar spine and paraspinal area, bilateral SI joint tenderness  Eyes:  PERRL, extra ocular movements intact, conjunctiva normal   HEENT:  Atraumatic, normocephalic, external ears normal, oropharynx moist, no pharyngeal exudates. Respiratory:  No respiratory distress. Bilateral wheezing  GI:  Soft, nondistended, normal bowel sounds, nontender, noorganomegaly, no mass, no rebound, no guarding   :  No costovertebral angle tenderness   Integument:  Well hydrated, no rash or telangiectasias  Lymphatic:  No lymphadenopathy noted   Neurologic:   Alert & oriented x 3, CN 2-12 normal, no focal deficits noted. Sensations Intact.  Muscle strength 5/5 proximally and distally in upper and lower extremities.    Psychiatric:  Speech and behavior appropriate   Extremities:2+ pedal edema in the right lower extremity up to the knee and 1+ pedal edema in the left lower extremity        LABS AND IMAGING  Outside data reviewed and in HPI    Lab Results   Component Value Date    WBC 11.5 03/08/2022    RBC 4.27 03/08/2022    RBC 3.57 05/16/2017    HGB 12.1 03/08/2022    HCT 37.1 03/08/2022     03/08/2022    MCV 87.0 03/08/2022    MCH 28.3 03/08/2022    MCHC 32.5 03/08/2022    RDW 13.1 03/08/2022    SEGSPCT 63.9 07/08/2010    BANDSPCT 1 01/11/2018    LYMPHOPCT 27.4 03/08/2022    LYMPHOPCT 26.2 05/16/2017    MONOPCT 6.2 03/08/2022    EOSPCT 0.8 07/08/2010    BASOPCT 0.6 03/08/2022    MONOSABS 0.7 03/08/2022    LYMPHSABS 3.1 03/08/2022    EOSABS 0.2 03/08/2022    BASOSABS 0.1 03/08/2022    DIFFTYPE Auto-K 07/08/2010       Chemistry        Component Value Date/Time     04/26/2022 1136    K 4.4 04/26/2022 1136    K 4.4 10/29/2020 0429    CL 98 (L) 04/26/2022 1136    CO2 29 04/26/2022 1136    BUN 46 (H) 04/26/2022 1136    CREATININE 1.8 (H) 04/26/2022 1136        Component Value Date/Time    CALCIUM 9.3 04/26/2022 1136    ALKPHOS 137 (H) 08/17/2021 1310    AST 17 03/08/2022 1529    ALT 12 03/08/2022 1529    BILITOT <0.2 08/17/2021 1310          Lab Results   Component Value Date    SEDRATE 93 (H) 08/17/2021     Lab Results   Component Value Date    CRP 8.0 (H) 08/17/2021     Lab Results   Component Value Date    BRENNA Negative 07/08/2010     Lab Results   Component Value Date    RF <10.0 08/10/2021     Lab Results   Component Value Date    BRENNA Negative 07/08/2010    ANATITER 1:80 08/10/2021     No results found for: DSDNAG, DSDNAIGGIFA  No results found for: Zigmund Cancel  No results found for: SMAB, RNPAB  No results found for: CENTABIGG  No results found for: C3, C4, ACE  Lab Results   Component Value Date    VITD25 47.2 04/26/2022     Lab Results   Component Value Date    LABURIC 6.4 (H) 04/26/2022     Lab Results   Component Value Date    [id number] 396 01/29/2020     Lab Results   Component Value Date    TSHFT4 0.45 08/10/2021    TSH 2.12 03/22/2022     Lab Results   Component Value Date    VITD25 47.2 04/26/2022     Bilateral hand, lumbar, cervical spine, SI joint x-rays 11/2/2021  Cervical spine: Mild endplate degenerative changes.  Minimal retrolisthesis   of C2 on C3 and anterolisthesis of C4 on C5.       Lumbar spine: Mild multilevel endplate degenerative changes.       SI joints: No radiographic evidence of sacroiliitis.  Mild bilateral SI joint   and hip joint degenerative changes.       Hands: Significant soft tissue swelling of the left hand and wrist.       Degenerative changes at multiple IP joints, severe at the left index finger   DIP joint with a questionable central erosion increasing suspicion for   erosive osteoarthritis.       Mild right 1st CMC joint degenerative changes.               ######################################################################    I thank you for giving me theopportunity to participate in [address] [place] [address]. If you have any questions or concerns please feel free to contact me. I look forward to following  Crow along with you. Electronically signed by: Serge Rodas MD, MD, 6/14/2022 2:56 PM    Documentation was done using voice recognition dragon software. Every effort was made to ensure accuracy;however, inadvertent unintentional computerized transcription errors may be present.

## 2022-06-15 ENCOUNTER — TELEPHONE (OUTPATIENT)
Dept: PAIN MANAGEMENT | Age: 52
End: 2022-06-15

## 2022-06-15 NOTE — TELEPHONE ENCOUNTER
Submitted PA for COLCHICINE  [facility] Key: [id number] STATUS: APPROVED. If this requires a response please respond to the pool ( [facility] [address]). Thank you please advise patient.

## 2022-06-21 ENCOUNTER — OFFICE VISIT (OUTPATIENT)
Dept: INTERNAL MEDICINE CLINIC | Age: 52
End: 2022-06-21
Payer: COMMERCIAL

## 2022-06-21 VITALS
DIASTOLIC BLOOD PRESSURE: 80 MMHG | OXYGEN SATURATION: 93 % | BODY MASS INDEX: 32.3 KG/M2 | HEART RATE: 72 BPM | WEIGHT: 188.2 LBS | SYSTOLIC BLOOD PRESSURE: 126 MMHG

## 2022-06-21 DIAGNOSIS — I89.0 LYMPHEDEMA: ICD-10-CM

## 2022-06-21 DIAGNOSIS — I89.0 LYMPHEDEMA OF UPPER EXTREMITY FOLLOWING LYMPHADENECTOMY: ICD-10-CM

## 2022-06-21 DIAGNOSIS — E89.89 LYMPHEDEMA OF UPPER EXTREMITY FOLLOWING LYMPHADENECTOMY: ICD-10-CM

## 2022-06-21 DIAGNOSIS — G44.221 CHRONIC TENSION-TYPE HEADACHE, INTRACTABLE: ICD-10-CM

## 2022-06-21 DIAGNOSIS — E78.5 HYPERLIPIDEMIA LDL GOAL <70: ICD-10-CM

## 2022-06-21 DIAGNOSIS — E11.42 DIABETIC POLYNEUROPATHY ASSOCIATED WITH TYPE 2 DIABETES MELLITUS (HCC): Primary | ICD-10-CM

## 2022-06-21 PROCEDURE — 3017F COLORECTAL CA SCREEN DOC REV: CPT | Performed by: INTERNAL MEDICINE

## 2022-06-21 PROCEDURE — 1036F TOBACCO NON-USER: CPT | Performed by: INTERNAL MEDICINE

## 2022-06-21 PROCEDURE — G8417 CALC BMI ABV UP PARAM F/U: HCPCS | Performed by: INTERNAL MEDICINE

## 2022-06-21 PROCEDURE — 3051F HG A1C>EQUAL 7.0%<8.0%: CPT | Performed by: INTERNAL MEDICINE

## 2022-06-21 PROCEDURE — G8427 DOCREV CUR MEDS BY ELIG CLIN: HCPCS | Performed by: INTERNAL MEDICINE

## 2022-06-21 PROCEDURE — 99214 OFFICE O/P EST MOD 30 MIN: CPT | Performed by: INTERNAL MEDICINE

## 2022-06-21 PROCEDURE — 2022F DILAT RTA XM EVC RTNOPTHY: CPT | Performed by: INTERNAL MEDICINE

## 2022-06-21 RX ORDER — BUTALBITAL, ACETAMINOPHEN AND CAFFEINE 50; 325; 40 MG/1; MG/1; MG/1
1 TABLET ORAL EVERY 6 HOURS PRN
Qty: 30 TABLET | Refills: 1 | Status: SHIPPED | OUTPATIENT
Start: 2022-06-21

## 2022-06-21 RX ORDER — LIDOCAINE 5% 5 G/100G
CREAM TOPICAL
Qty: 30 G | Refills: 5 | Status: SHIPPED | OUTPATIENT
Start: 2022-06-21

## 2022-06-21 RX ORDER — ROSUVASTATIN CALCIUM 10 MG/1
10 TABLET, COATED ORAL NIGHTLY
Qty: 90 TABLET | Refills: 3 | Status: SHIPPED | OUTPATIENT
Start: 2022-06-21

## 2022-06-21 NOTE — PROGRESS NOTES
Patient: Amos Pickard is a 47 y.o. female who presents today with the following Chief Complaint(s):  Chief Complaint   Patient presents with    Check-Up    Migraine     constant always worse at night        HPI     Here today for follow up. Accompanied by her  who interprets for her. States that she is doing bad. Her pain is worse, cannot take it any more. Pain is severe in her feet and legs. Did see Dr. Jesenia Bishop and was dx with gout. Has Colchrys to take q 48 hours. Pain is a stabbing, burning pain. Hurts her feet to walk. Pain in feet is worse at night. Lidocaine cream has been helpful. Did miss her appointment with neurology as it was a virtual appointment. Is r/s to September 26, 2022. Headaches are severe. Does have arthritis in her neck and pain specialist suspects that HARDEN may be related to arthritis in neck. Did try some injections in her neck but insurance denied further treatments. HA are in her temples and parietal area. HA are a tightness, squeezing pain. Feels better with band around her head but does not gets worse when she takes the band off. HA is not as bad in am, gets worse throughout the day and really starts to hurt by 3 pm. HA is a pressure sensation with throbbing. Fiorocet has helped in the past (took in 2020). No raise in BP and sugar is \"fine\" when she has HA. BS was 107 this am    Has also been having swelling in her right arm and leg. Does have CHF but weight is low. Wears a size 8 on her left foot and 10 on right foot d/t swelling. No change in pain with stopping simvastatin.      Lab Results   Component Value Date    CHOL 201 (H) 04/26/2022    CHOL 177 02/18/2019    CHOL 194 04/30/2018     Lab Results   Component Value Date    TRIG 484 (H) 04/26/2022    TRIG 409 (H) 02/18/2019    TRIG 406 (H) 04/30/2018     Lab Results   Component Value Date    HDL 32 (L) 04/26/2022    HDL 38 (L) 02/18/2019    HDL 38 (L) 04/30/2018     Lab Results   Component Value Date    LDLCALC see below 04/26/2022 [address] see below 02/18/2019 [address] see below 04/30/2018     Lab Results   Component Value Date    LABVLDL see below 04/26/2022    LABVLDL see below 02/18/2019    LABVLDL see below 04/30/2018     No results found for: [facility]    Results for orders placed or performed in visit on 06/14/22   Creatinine   Result Value Ref Range    CREATININE 1.3 (H) 0.6 - 1.1 mg/dL    GFR Non- 43 (A) >60    GFR  52 (A) >60   CBC with Auto Differential   Result Value Ref Range    WBC 7.4 4.0 - 11.0 K/uL    RBC 4.54 4.00 - 5.20 M/uL    Hemoglobin 12.9 12.0 - 16.0 g/dL    Hematocrit 39.2 36.0 - 48.0 %    MCV 86.4 80.0 - 100.0 fL    MCH 28.5 26.0 - 34.0 pg    MCHC 33.0 31.0 - 36.0 g/dL    RDW 13.8 12.4 - 15.4 %    Platelets 888 239 - 214 K/uL    MPV 9.4 5.0 - 10.5 fL    Neutrophils % 64.3 %    Lymphocytes % 25.9 %    Monocytes % 8.1 %    Eosinophils % 1.2 %    Basophils % 0.5 %    Neutrophils Absolute 4.7 1.7 - 7.7 K/uL    Lymphocytes Absolute 1.9 1.0 - 5.1 K/uL    Monocytes Absolute 0.6 0.0 - 1.3 K/uL    Eosinophils Absolute 0.1 0.0 - 0.6 K/uL    Basophils Absolute 0.0 0.0 - 0.2 K/uL   AST   Result Value Ref Range    AST 14 (L) 15 - 37 U/L   ALT   Result Value Ref Range    ALT 14 10 - 40 U/L   Uric Acid   Result Value Ref Range    Uric Acid, Serum 3.7 2.6 - 6.0 mg/dL     *Note: Due to a large number of results and/or encounters for the requested time period, some results have not been displayed. A complete set of results can be found in Results Review.       Allergies   Allergen Reactions    Iv Dye [Iodides] Anaphylaxis     allergic to ct scan dye, not the MRI    Diazepam Other (See Comments)    Insulin Glargine Other (See Comments)     High blood sugar     Trazodone And Nefazodone Other (See Comments)     Makes patient feel very sluggish      Past Medical History:   Diagnosis Date    Anxiety     Anxiety and depression     Arthritis     not sure of specific type    Asthma     CAD (coronary artery disease)     Cancer ([facility] 2007    left breast    Cerebral artery occlusion with cerebral infarction ([facility]     2000, 2001    CHF (congestive heart failure) ([facility] 2018    Chronic kidney disease     renal insufficency/to see Dr John Saeed    Chronic pain     Constipation     COPD (chronic obstructive pulmonary disease) ([facility]     Depression     Diabetes mellitus ([facility]     Diabetic polyneuropathy associated with type 2 diabetes mellitus ([facility] 2/2/2018    Dysthymia 2/2/2018    ESBL (extended spectrum beta-lactamase) producing bacteria infection 03/14/2018    urine    Fibromyalgia     Gastric ulcer, unspecified as acute or chronic, without mention of hemorrhage, perforation, or obstruction     GERD (gastroesophageal reflux disease)     Gout     HIGH CHOLESTEROL     Hypertension     Hypothyroidism     Severe persistent asthma without complication 9/2/7241    Thyroid disease     TIA (transient ischemic attack)     with occasional left leg and hand weakness      Past Surgical History:   Procedure Laterality Date    BREAST SURGERY      left mastectomy    CARPAL TUNNEL RELEASE      Bilateral    FINGER CONTRACTURE SURGERY      HYSTERECTOMY (CERVIX STATUS UNKNOWN)  2005    USO    MASTECTOMY      TONSILLECTOMY      UPPER GASTROINTESTINAL ENDOSCOPY  2019    stretched esophagous       Social History     Socioeconomic History    Marital status:      Spouse name: aDvid Wilson    Number of children: 3    Years of education: Not on file    Highest education level: Not on file   Occupational History    Occupation: disabled   Tobacco Use    Smoking status: Never Smoker    Smokeless tobacco: Never Used   Vaping Use    Vaping Use: Never used   Substance and Sexual Activity    Alcohol use: No    Drug use: No    Sexual activity: Not Currently   Other Topics Concern    Not on file   Social History Narrative    Not on file     Social Determinants of Health Financial Resource Strain: Low Risk     Difficulty of Paying Living Expenses: Not hard at all   Food Insecurity: No Food Insecurity    Worried About Running Out of Food in the Last Year: Never true    Linda of Food in the Last Year: Never true   Transportation Needs:     Lack of Transportation (Medical): Not on file    Lack of Transportation (Non-Medical):  Not on file   Physical Activity:     Days of Exercise per Week: Not on file    Minutes of Exercise per Session: Not on file   Stress:     Feeling of Stress : Not on file   Social Connections:     Frequency of Communication with Friends and Family: Not on file    Frequency of Social Gatherings with Friends and Family: Not on file    Attends Mu-ism Services: Not on file    Active Member of [address] [place] [address] [employer] or Organizations: Not on file    Attends Club or Organization Meetings: Not on file    Marital Status: Not on file   Intimate Partner Violence:     Fear of Current or Ex-Partner: Not on file    Emotionally Abused: Not on file    Physically Abused: Not on file    Sexually Abused: Not on file   Housing Stability:     Unable to Pay for Housing in the Last Year: Not on file    Number of [place] in the Last Year: Not on file    Unstable Housing in the Last Year: Not on file     Family History   Problem Relation Age of Onset    Asthma Other     Cancer Other     Depression Other     Diabetes Other     Hypertension Other     High Cholesterol Other     Migraines Other     Heart Attack Father 72         of MI    High Blood Pressure Mother     Diabetes Mother         Outpatient Medications Prior to Visit   Medication Sig Dispense Refill    Febuxostat 80 MG TABS Take 80 mg by mouth daily 90 tablet 1    leflunomide (ARAVA) 20 MG tablet TAKE 1 TABLET BY MOUTH EVERY DAY 90 tablet 0    pregabalin (LYRICA) 75 MG capsule Take 1 cap twice a day 60 capsule 2    sulfaSALAzine (AZULFIDINE) 500 MG tablet Take 1 tablet by mouth two times daily 180 tablet 0    colchicine (COLCRYS) 0.6 MG tablet Take 1 tablet by mouth every 48 hours 45 tablet 1    STEGLATRO 5 MG TABS TAKE 1 TABLET BY MOUTH EVERY DAY 90 tablet 1    levothyroxine (SYNTHROID) 150 MCG tablet TAKE 1 TABLET BY MOUTH IN THE MORNING before breakfast 90 tablet 0    FEROSUL 325 (65 Fe) MG tablet TAKE 1 TABLET BY MOUTH TWO TIMES A DAY WITH MEALS 180 tablet 2    ranolazine (RANEXA) 500 MG extended release tablet TAKE 1 TABLET BY MOUTH TWO TIMES A  tablet 3    SM SENNA-S 8.6-50 MG per tablet TAKE 2 TABLETS BY MOUTH TWO TIMES A  tablet 0    torsemide (DEMADEX) 100 MG tablet TAKE 1 TABLET BY MOUTH IN THE MORNING AND 1/2 TABLET IN THE EVENING. 135 tablet 0    spironolactone (ALDACTONE) 50 MG tablet TAKE 2 TABLETS BY MOUTH IN THE MORNING AND THEN TAKE 1 TABLET BY MOUTH IN THE EVENING 270 tablet 3    montelukast (SINGULAIR) 10 MG tablet take 1 tablet by mouth nightly 30 tablet 0    Urea 40 % LOTN Apply to feet nightly and cover with Aquaphor 325 mL 5    mineral oil-hydrophilic petrolatum (HYDROPHOR) ointment Apply topically as needed to hands and feet. 454 g 5    lubiprostone (AMITIZA) 24 MCG capsule TAKE 1 CAPSULE BY MOUTH TWO TIMES A DAY WITH MEALS 60 capsule 0    Compression Stockings MISC by Does not apply route Zippered stockings for daily use on lower extremities (do not wear at night). 20-30mmHg. 1 each 1    ONETOUCH VERIO strip use to test blood sugar 5 times daily 200 strip 5    insulin aspart (NOVOLOG FLEXPEN) 100 UNIT/ML injection pen inject 30 to 50 units into the skin three time daily before meals 60 mL 3    metOLazone (ZAROXOLYN) 2.5 MG tablet TAKE 1 TABLET BY MOUTH TWO TIMES A WEEK AS NEEDED FOR WEIGHT OVER 180 POUNDS.  DO NOT TAKE 2 DAYS IN A ROW. (Patient taking differently: Take 2.5 mg by mouth twice a week ) 24 tablet 0    Continuous Blood Gluc Sensor (FREESTYLE EMERALD 2 SENSOR) MISC Change every 14 days 2 each 5    Continuous Blood Gluc  (FREESTYLE EMERALD 2 READER) MINDY To check glucose levels 1 each 0    omeprazole (PRILOSEC) 20 MG delayed release capsule TAKE 1 CAPSULE BY MOUTH TWO TIMES A DAY 60 capsule 5    metoprolol tartrate (LOPRESSOR) 25 MG tablet TAKE 1 TABLET BY MOUTH TWO TIMES A  tablet 0    TRESIBA FLEXTOUCH 200 UNIT/ML SOPN INJECT 200 UNITS SUBCUTANEOUSLY ONCE DAILY 20 pen 1    albuterol (PROVENTIL) (2.5 MG/3ML) 0.083% nebulizer solution Take 3 mLs by nebulization every 6 hours as needed for Wheezing or Shortness of Breath 120 each 1    insulin aspart (NOVOLOG) 100 UNIT/ML injection vial Use up to 30 units with each meal 3 each 3    polyethylene glycol (GLYCOLAX) 17 GM/SCOOP powder TAKE 17 GRAMS (1 CAPFUL) BY MOUTH NIGHTLY 510 g 0    LINZESS 290 MCG CAPS capsule TAKE 1 CAPSULE BY MOUTH ONE TIME A DAY FOR 90 DAYS      REXULTI 2 MG TABS tablet Take 2 mg by mouth at bedtime       methocarbamol (ROBAXIN-750) 750 MG tablet Take 1 tablet by mouth 3 times daily as needed (pain and spasm) 30 tablet 0    nitroGLYCERIN (NITROSTAT) 0.4 MG SL tablet PLACE ONE TABLET UNDER TONGUE AS NEEDED FOR CHEST PAIN, MAY REPEAT EVERY 5 MINUTES AS NEEDED UP TO A MAX OF 3 TABLETS. IF NO RELIEF AFTER 1 DOSE, CALL 911. 25 tablet 0    Blood Glucose Monitoring Suppl (ONETOUCH VERIO) w/Device KIT Use to check glucose 4 times daily.  1 kit 0    Insulin Pen Needle (B-D UF III MINI PEN NEEDLES) 31G X 5 MM MISC use five times daily with insulin 200 each 5    vitamin D3 (CHOLECALCIFEROL) 25 MCG (1000 UT) TABS tablet TAKE 3 TABLETS BY MOUTH ONE TIME A DAY 90 tablet 5    nystatin (MYCOSTATIN) 656907 UNIT/ML suspension Take 5 mLs by mouth 4 times daily 500 mL 1    Magic Mouthwash (MIRACLE MOUTHWASH) Swish and spit 5 mLs 4 times daily as needed for Irritation or Pain 300 mL 2    traZODone (DESYREL) 50 MG tablet Take 1 tablet by mouth nightly Take it on the day of sleep study 1 tablet 0    loratadine (CLARITIN) 10 MG tablet TAKE 1 TABLET BY MOUTH ONE TIME A DAY  30 tablet 5    ketoconazole (NIZORAL) 2 % shampoo Apply topically daily as needed. 120 mL 1    diazePAM (VALIUM) 5 MG tablet Take 5 mg by mouth 2 times daily as needed for Anxiety.  OXYGEN Inhale 2 L into the lungs nightly Sometimes with activity      oxyCODONE (OXYCONTIN) 20 MG extended release tablet Take 20 mg by mouth every 12 hours.  aspirin 81 MG EC tablet Take 81 mg by mouth daily      benztropine (COGENTIN) 1 MG tablet Take 1 mg by mouth nightly       folic acid (FOLVITE) 1 MG tablet Take 1 mg by mouth daily      oxyCODONE-acetaminophen (PERCOCET)  MG per tablet Take 1 tablet by mouth every 4 hours as needed for Pain . Earliest Fill Date: 6/8/17 100 tablet 0    duloxetine (CYMBALTA) 60 MG capsule Take 60 mg by mouth 2 times daily.  simvastatin (ZOCOR) 20 MG tablet TAKE 1 TABLET BY MOUTH EVERY DAY AT NIGHT 90 tablet 0     No facility-administered medications prior to visit. Patient'spast medical history, surgical history, family history, medications,  and allergies  were all reviewed and updated as appropriate today. Review of Systems    /80   Pulse 72   Wt 188 lb 3.2 oz (85.4 kg)   SpO2 93%   BMI 32.30 kg/m²   Physical Exam  Vitals and nursing note reviewed. Constitutional:       General: She is in acute distress (rocking back and forth in pain, tearful. ). Appearance: She is well-developed. She is not toxic-appearing. HENT:      Head: Normocephalic. Right Ear: Tympanic membrane, ear canal and external ear normal.      Left Ear: Tympanic membrane, ear canal and external ear normal.      Mouth/Throat:      Pharynx: No oropharyngeal exudate or posterior oropharyngeal erythema. Eyes:      General: No scleral icterus. Extraocular Movements: Extraocular movements intact. Conjunctiva/sclera: Conjunctivae normal.      Pupils: Pupils are equal, round, and reactive to light. Neck:      Thyroid: No thyroid mass or thyromegaly.       Vascular: No carotid bruit. Cardiovascular:      Rate and Rhythm: Normal rate and regular rhythm. Heart sounds: Normal heart sounds. No murmur heard. Comments: 4+ edema left hand  2+ edema right hand  Pulmonary:      Effort: Pulmonary effort is normal.      Breath sounds: Normal breath sounds. Musculoskeletal:      Right lower leg: 3+ Edema present. Left lower le+ Edema present. Lymphadenopathy:      Cervical: No cervical adenopathy. Neurological:      General: No focal deficit present. Mental Status: She is alert and oriented to person, place, and time. Psychiatric:         Mood and Affect: Mood normal.         Behavior: Behavior normal. Behavior is cooperative. ASSESSMENT/PLAN:    Problem List Items Addressed This Visit     Diabetic polyneuropathy associated with type 2 diabetes mellitus ([facility] - Primary     Follows with Dr. Nakita Shaver. Recommend use of lidocaine cream.         Relevant Medications    Lidocaine 5 % CREA    Hyperlipidemia LDL goal <70     No pain improvement with discontinuing simvastatin. We will start Crestor 10 mg daily. Check CMP. Relevant Medications    rosuvastatin (CRESTOR) 10 MG tablet    Other Relevant Orders    Comprehensive Metabolic Panel    Lymphedema      Patient has lymphedema of her left arm following axillary lymph node dissection due to breast cancer. She is also having swelling in her right arm and leg. Refer to vascular surgery for further evaluation. Relevant Orders    Kojo Magaña MD, Vascular Surgery, [facility]    Lymphedema of upper extremity following lymphadenectomy     Stable, but she has also been noted recently to have swelling in her right arm and leg. Relevant Orders    Kojo Magaña MD, Vascular Surgery, [facility]    Tension type headache     Referral placed for OMT. Has appointment with neurology but not until September.   Did have KIKI of her cervical spine with improvement in pain and headaches but insurance would not approve further treatments. Has had improvement in pain with use of Fioricet. Fioricet refilled for patient. Relevant Medications    butalbital-acetaminophen-caffeine (FIORICET, ESGIC) -40 MG per tablet    Other Relevant Orders    Meaghan Vincent DO, Osteopathic Manipulation Treatment, [facility]          Current Outpatient Medications   Medication Sig Dispense Refill    Lidocaine 5 % CREA Apply to feet QID prn pain for peripheral neuropathy 30 g 5    butalbital-acetaminophen-caffeine (FIORICET, ESGIC) -40 MG per tablet Take 1 tablet by mouth every 6 hours as needed for Headaches 30 tablet 1    rosuvastatin (CRESTOR) 10 MG tablet Take 1 tablet by mouth nightly 90 tablet 3    Febuxostat 80 MG TABS Take 80 mg by mouth daily 90 tablet 1    leflunomide (ARAVA) 20 MG tablet TAKE 1 TABLET BY MOUTH EVERY DAY 90 tablet 0    pregabalin (LYRICA) 75 MG capsule Take 1 cap twice a day 60 capsule 2    sulfaSALAzine (AZULFIDINE) 500 MG tablet Take 1 tablet by mouth two times daily 180 tablet 0    colchicine (COLCRYS) 0.6 MG tablet Take 1 tablet by mouth every 48 hours 45 tablet 1    STEGLATRO 5 MG TABS TAKE 1 TABLET BY MOUTH EVERY DAY 90 tablet 1    levothyroxine (SYNTHROID) 150 MCG tablet TAKE 1 TABLET BY MOUTH IN THE MORNING before breakfast 90 tablet 0    ranolazine (RANEXA) 500 MG extended release tablet TAKE 1 TABLET BY MOUTH TWO TIMES A  tablet 3    SM SENNA-S 8.6-50 MG per tablet TAKE 2 TABLETS BY MOUTH TWO TIMES A  tablet 0    torsemide (DEMADEX) 100 MG tablet TAKE 1 TABLET BY MOUTH IN THE MORNING AND 1/2 TABLET IN THE EVENING.  135 tablet 0    spironolactone (ALDACTONE) 50 MG tablet TAKE 2 TABLETS BY MOUTH IN THE MORNING AND THEN TAKE 1 TABLET BY MOUTH IN THE EVENING 270 tablet 3    Urea 40 % LOTN Apply to feet nightly and cover with Aquaphor 325 mL 5    mineral oil-hydrophilic petrolatum (HYDROPHOR) ointment Apply topically as needed to hands and feet. 454 g 5    Compression Stockings MISC by Does not apply route Zippered stockings for daily use on lower extremities (do not wear at night). 20-30mmHg. 1 each 1    ONETOUCH VERIO strip use to test blood sugar 5 times daily 200 strip 5    insulin aspart (NOVOLOG FLEXPEN) 100 UNIT/ML injection pen inject 30 to 50 units into the skin three time daily before meals 60 mL 3    metOLazone (ZAROXOLYN) 2.5 MG tablet TAKE 1 TABLET BY MOUTH TWO TIMES A WEEK AS NEEDED FOR WEIGHT OVER 180 POUNDS. DO NOT TAKE 2 DAYS IN A ROW. (Patient taking differently: Take 2.5 mg by mouth twice a week ) 24 tablet 0    Continuous Blood Gluc Sensor (FREESTYLE EMERALD 2 SENSOR) MISC Change every 14 days 2 each 5    Continuous Blood Gluc  (FREESTYLE EMERALD 2 READER) MINDY To check glucose levels 1 each 0    omeprazole (PRILOSEC) 20 MG delayed release capsule TAKE 1 CAPSULE BY MOUTH TWO TIMES A DAY 60 capsule 5    metoprolol tartrate (LOPRESSOR) 25 MG tablet TAKE 1 TABLET BY MOUTH TWO TIMES A  tablet 0    TRESIBA FLEXTOUCH 200 UNIT/ML SOPN INJECT 200 UNITS SUBCUTANEOUSLY ONCE DAILY 20 pen 1    albuterol (PROVENTIL) (2.5 MG/3ML) 0.083% nebulizer solution Take 3 mLs by nebulization every 6 hours as needed for Wheezing or Shortness of Breath 120 each 1    insulin aspart (NOVOLOG) 100 UNIT/ML injection vial Use up to 30 units with each meal 3 each 3    polyethylene glycol (GLYCOLAX) 17 GM/SCOOP powder TAKE 17 GRAMS (1 CAPFUL) BY MOUTH NIGHTLY 510 g 0    LINZESS 290 MCG CAPS capsule TAKE 1 CAPSULE BY MOUTH ONE TIME A DAY FOR 90 DAYS      REXULTI 2 MG TABS tablet Take 2 mg by mouth at bedtime       methocarbamol (ROBAXIN-750) 750 MG tablet Take 1 tablet by mouth 3 times daily as needed (pain and spasm) 30 tablet 0    nitroGLYCERIN (NITROSTAT) 0.4 MG SL tablet PLACE ONE TABLET UNDER TONGUE AS NEEDED FOR CHEST PAIN, MAY REPEAT EVERY 5 MINUTES AS NEEDED UP TO A MAX OF 3 TABLETS.   IF NO RELIEF AFTER 1 DOSE, CALL 911. 25 tablet 0    Blood Glucose Monitoring Suppl (ONETOUCH VERIO) w/Device KIT Use to check glucose 4 times daily. 1 kit 0    Insulin Pen Needle (B-D UF III MINI PEN NEEDLES) 31G X 5 MM MISC use five times daily with insulin 200 each 5    vitamin D3 (CHOLECALCIFEROL) 25 MCG (1000 UT) TABS tablet TAKE 3 TABLETS BY MOUTH ONE TIME A DAY 90 tablet 5    nystatin (MYCOSTATIN) 065537 UNIT/ML suspension Take 5 mLs by mouth 4 times daily 500 mL 1    Magic Mouthwash (MIRACLE MOUTHWASH) Swish and spit 5 mLs 4 times daily as needed for Irritation or Pain 300 mL 2    traZODone (DESYREL) 50 MG tablet Take 1 tablet by mouth nightly Take it on the day of sleep study 1 tablet 0    loratadine (CLARITIN) 10 MG tablet TAKE 1 TABLET BY MOUTH ONE TIME A DAY  30 tablet 5    ketoconazole (NIZORAL) 2 % shampoo Apply topically daily as needed. 120 mL 1    diazePAM (VALIUM) 5 MG tablet Take 5 mg by mouth 2 times daily as needed for Anxiety.  OXYGEN Inhale 2 L into the lungs nightly Sometimes with activity      oxyCODONE (OXYCONTIN) 20 MG extended release tablet Take 20 mg by mouth every 12 hours.  aspirin 81 MG EC tablet Take 81 mg by mouth daily      benztropine (COGENTIN) 1 MG tablet Take 1 mg by mouth nightly       folic acid (FOLVITE) 1 MG tablet Take 1 mg by mouth daily      oxyCODONE-acetaminophen (PERCOCET)  MG per tablet Take 1 tablet by mouth every 4 hours as needed for Pain . Earliest Fill Date: 6/8/17 100 tablet 0    duloxetine (CYMBALTA) 60 MG capsule Take 60 mg by mouth 2 times daily.  FEROSUL 325 (65 Fe) MG tablet TAKE 1 TABLET BY MOUTH TWO TIMES A DAY WITH MEALS 180 tablet 0    lubiprostone (AMITIZA) 24 MCG capsule TAKE 1 CAPSULE BY MOUTH TWO TIMES A DAY WITH MEALS 60 capsule 0    montelukast (SINGULAIR) 10 MG tablet TAKE 1 TABLET BY MOUTH nightly 30 tablet 0     No current facility-administered medications for this visit.        Return in about 2 months (around 8/21/2022). 37 minutes spent in face-to-face encounter with patient today.

## 2022-06-22 DIAGNOSIS — D50.9 IRON DEFICIENCY ANEMIA, UNSPECIFIED IRON DEFICIENCY ANEMIA TYPE: ICD-10-CM

## 2022-06-23 RX ORDER — MONTELUKAST SODIUM 10 MG/1
10 TABLET ORAL NIGHTLY
Qty: 30 TABLET | Refills: 0 | Status: SHIPPED | OUTPATIENT
Start: 2022-06-23 | End: 2022-08-03

## 2022-06-23 RX ORDER — FERROUS SULFATE 325(65) MG
TABLET ORAL
Qty: 180 TABLET | Refills: 0 | Status: SHIPPED | OUTPATIENT
Start: 2022-06-23 | End: 2022-07-13

## 2022-06-23 RX ORDER — LUBIPROSTONE 24 UG/1
CAPSULE, GELATIN COATED ORAL
Qty: 60 CAPSULE | Refills: 0 | Status: SHIPPED | OUTPATIENT
Start: 2022-06-23 | End: 2022-08-03

## 2022-06-26 PROBLEM — I89.0 LYMPHEDEMA: Status: ACTIVE | Noted: 2022-06-26

## 2022-06-27 NOTE — ASSESSMENT & PLAN NOTE
Referral placed for OMT. Has appointment with neurology but not until September. Did have KIKI of her cervical spine with improvement in pain and headaches but insurance would not approve further treatments. Has had improvement in pain with use of Fioricet. Fioricet refilled for patient.

## 2022-06-27 NOTE — ASSESSMENT & PLAN NOTE
Patient has lymphedema of her left arm following axillary lymph node dissection due to breast cancer. She is also having swelling in her right arm and leg. Refer to vascular surgery for further evaluation.

## 2022-06-30 ENCOUNTER — TELEPHONE (OUTPATIENT)
Dept: ENDOCRINOLOGY | Age: 52
End: 2022-06-30

## 2022-06-30 DIAGNOSIS — I50.32 CHRONIC HEART FAILURE WITH PRESERVED EJECTION FRACTION (HCC): ICD-10-CM

## 2022-06-30 DIAGNOSIS — I25.10 CORONARY ARTERY DISEASE INVOLVING NATIVE CORONARY ARTERY OF NATIVE HEART WITHOUT ANGINA PECTORIS: ICD-10-CM

## 2022-06-30 DIAGNOSIS — R06.02 SOB (SHORTNESS OF BREATH): ICD-10-CM

## 2022-06-30 DIAGNOSIS — E55.9 VITAMIN D DEFICIENCY: ICD-10-CM

## 2022-06-30 DIAGNOSIS — I10 ESSENTIAL HYPERTENSION: ICD-10-CM

## 2022-06-30 DIAGNOSIS — E78.49 OTHER HYPERLIPIDEMIA: ICD-10-CM

## 2022-06-30 DIAGNOSIS — N18.4 CKD (CHRONIC KIDNEY DISEASE) STAGE 4, GFR 15-29 ML/MIN (HCC): ICD-10-CM

## 2022-06-30 LAB
ANION GAP SERPL CALCULATED.3IONS-SCNC: 13 MMOL/L (ref 3–16)
BUN BLDV-MCNC: 35 MG/DL (ref 7–20)
CALCIUM SERPL-MCNC: 9.2 MG/DL (ref 8.3–10.6)
CHLORIDE BLD-SCNC: 93 MMOL/L (ref 99–110)
CHOLESTEROL, TOTAL: 268 MG/DL (ref 0–199)
CO2: 30 MMOL/L (ref 21–32)
CREAT SERPL-MCNC: 1.7 MG/DL (ref 0.6–1.1)
GFR AFRICAN AMERICAN: 38
GFR NON-AFRICAN AMERICAN: 31
GLUCOSE BLD-MCNC: 231 MG/DL (ref 70–99)
HDLC SERPL-MCNC: 33 MG/DL (ref 40–60)
LDL CHOLESTEROL CALCULATED: ABNORMAL MG/DL
LDL CHOLESTEROL DIRECT: 116 MG/DL
POTASSIUM SERPL-SCNC: 4.9 MMOL/L (ref 3.5–5.1)
PRO-BNP: 188 PG/ML (ref 0–124)
SODIUM BLD-SCNC: 136 MMOL/L (ref 136–145)
TRIGL SERPL-MCNC: 627 MG/DL (ref 0–150)
VITAMIN D 25-HYDROXY: 47.8 NG/ML
VLDLC SERPL CALC-MCNC: ABNORMAL MG/DL

## 2022-06-30 RX ORDER — INSULIN DEGLUDEC 200 U/ML
INJECTION, SOLUTION SUBCUTANEOUS
Qty: 20 PEN | Refills: 3 | Status: SHIPPED | OUTPATIENT
Start: 2022-06-30 | End: 2022-07-13

## 2022-06-30 NOTE — TELEPHONE ENCOUNTER
Requesting refill  TRESIBA FLEXTOUCH 200 UNIT/ML [id number] - [place], [address] [address], [address] [id number]   Phone:  [phone]  Fax:  [phone]

## 2022-07-01 ENCOUNTER — TELEPHONE (OUTPATIENT)
Dept: CARDIOLOGY CLINIC | Age: 52
End: 2022-07-01

## 2022-07-01 NOTE — TELEPHONE ENCOUNTER
----- Message from Rhonda Qureshi MD sent at 7/1/2022 11:27 AM EDT -----  Call patient. Her labs look okay. However, her triglycerides are very high. Not sure if she was not fasting to cause this or not. Might want to discuss with her endocrine doctor. However, her fluid level looks good.   ARETHA

## 2022-07-05 DIAGNOSIS — M1A.09X0 IDIOPATHIC CHRONIC GOUT OF MULTIPLE SITES WITHOUT TOPHUS: ICD-10-CM

## 2022-07-06 ENCOUNTER — TELEPHONE (OUTPATIENT)
Dept: CARDIOLOGY CLINIC | Age: 52
End: 2022-07-06

## 2022-07-06 RX ORDER — FEBUXOSTAT 80 MG/1
TABLET, FILM COATED ORAL
Qty: 90 TABLET | Refills: 5 | Status: SHIPPED | OUTPATIENT
Start: 2022-07-06 | End: 2022-07-22 | Stop reason: SDUPTHER

## 2022-07-06 NOTE — TELEPHONE ENCOUNTER
I confirmed with her  that she takes torsemide 100mg/50mg qd and spironolactone 100mg/50mg qd. She took metolazone this past Sunday. The weight gain has been gradual 1-2# every day along with increase in swelling and worsening MIX. Her last PCP amy't was 6/21/22. Any new orders? Thank you.

## 2022-07-06 NOTE — TELEPHONE ENCOUNTER
Last visit: 06/14/2022    Lab: 06/14/2022     Next visit: 09/14/2022    last refill: 06/14/2022 (90tablets) with 1 refill

## 2022-07-06 NOTE — TELEPHONE ENCOUNTER
I spoke with pt . Pt He states that pt has weight gain. She is 200 lb. She says that she is too heavy to walk.  says that she has severe swelling- pitting edema severe SOB. Her weight at her last . She takes Metolazone biweekly.  says she is urinating normally.

## 2022-07-12 RX ORDER — BLOOD-GLUCOSE METER
EACH MISCELLANEOUS
Qty: 1 KIT | Refills: 0 | Status: SHIPPED | OUTPATIENT
Start: 2022-07-12

## 2022-07-12 NOTE — TELEPHONE ENCOUNTER
Pt's  calling and states the pt's One Touch Verio meter is broke and she will need a new one. He states he called last week? Send to [place] on [address]    # [phone]

## 2022-07-13 DIAGNOSIS — I25.10 CORONARY ARTERY DISEASE INVOLVING NATIVE CORONARY ARTERY OF NATIVE HEART WITHOUT ANGINA PECTORIS: ICD-10-CM

## 2022-07-13 DIAGNOSIS — D50.9 IRON DEFICIENCY ANEMIA, UNSPECIFIED IRON DEFICIENCY ANEMIA TYPE: ICD-10-CM

## 2022-07-13 RX ORDER — INSULIN DEGLUDEC 200 U/ML
INJECTION, SOLUTION SUBCUTANEOUS
Qty: 54 ML | Refills: 0 | OUTPATIENT
Start: 2022-07-13

## 2022-07-13 RX ORDER — FERROUS SULFATE 325(65) MG
TABLET ORAL
Qty: 180 TABLET | Refills: 0 | Status: SHIPPED | OUTPATIENT
Start: 2022-07-13 | End: 2022-10-12

## 2022-07-13 RX ORDER — ERTUGLIFLOZIN 5 MG/1
TABLET, FILM COATED ORAL
Qty: 90 TABLET | Refills: 0 | OUTPATIENT
Start: 2022-07-13

## 2022-07-13 RX ORDER — ERTUGLIFLOZIN 5 MG/1
TABLET, FILM COATED ORAL
Qty: 90 TABLET | Refills: 1 | Status: SHIPPED | OUTPATIENT
Start: 2022-07-13

## 2022-07-13 RX ORDER — INSULIN DEGLUDEC 200 U/ML
INJECTION, SOLUTION SUBCUTANEOUS
Qty: 54 ML | Refills: 1 | Status: SHIPPED | OUTPATIENT
Start: 2022-07-13 | End: 2022-08-12

## 2022-07-13 RX ORDER — SIMVASTATIN 20 MG
TABLET ORAL
Qty: 90 TABLET | Refills: 0 | OUTPATIENT
Start: 2022-07-13

## 2022-07-14 DIAGNOSIS — M1A.09X0 IDIOPATHIC CHRONIC GOUT OF MULTIPLE SITES WITHOUT TOPHUS: ICD-10-CM

## 2022-07-15 RX ORDER — FEBUXOSTAT 80 MG/1
TABLET, FILM COATED ORAL
Qty: 90 TABLET | Refills: 0 | OUTPATIENT
Start: 2022-07-15

## 2022-07-21 DIAGNOSIS — M1A.09X0 IDIOPATHIC CHRONIC GOUT OF MULTIPLE SITES WITHOUT TOPHUS: ICD-10-CM

## 2022-07-22 RX ORDER — FEBUXOSTAT 80 MG/1
TABLET, FILM COATED ORAL
Qty: 90 TABLET | Refills: 0 | Status: SHIPPED | OUTPATIENT
Start: 2022-07-22 | End: 2022-09-14 | Stop reason: SDUPTHER

## 2022-07-27 ENCOUNTER — TELEMEDICINE (OUTPATIENT)
Dept: ENDOCRINOLOGY | Age: 52
End: 2022-07-27
Payer: COMMERCIAL

## 2022-07-27 DIAGNOSIS — E03.2 HYPOTHYROIDISM DUE TO MEDICATION: ICD-10-CM

## 2022-07-27 DIAGNOSIS — E55.9 VITAMIN D DEFICIENCY: ICD-10-CM

## 2022-07-27 PROCEDURE — 99213 OFFICE O/P EST LOW 20 MIN: CPT | Performed by: INTERNAL MEDICINE

## 2022-07-27 PROCEDURE — 3017F COLORECTAL CA SCREEN DOC REV: CPT | Performed by: INTERNAL MEDICINE

## 2022-07-27 PROCEDURE — 3051F HG A1C>EQUAL 7.0%<8.0%: CPT | Performed by: INTERNAL MEDICINE

## 2022-07-27 PROCEDURE — 2022F DILAT RTA XM EVC RTNOPTHY: CPT | Performed by: INTERNAL MEDICINE

## 2022-07-27 PROCEDURE — G8427 DOCREV CUR MEDS BY ELIG CLIN: HCPCS | Performed by: INTERNAL MEDICINE

## 2022-07-27 RX ORDER — FLASH GLUCOSE SENSOR
KIT MISCELLANEOUS
Qty: 2 EACH | Refills: 5 | Status: SHIPPED | OUTPATIENT
Start: 2022-07-27

## 2022-07-27 RX ORDER — MELATONIN
Qty: 90 TABLET | Refills: 5 | Status: SHIPPED | OUTPATIENT
Start: 2022-07-27

## 2022-07-27 RX ORDER — INSULIN ASPART 100 [IU]/ML
INJECTION, SOLUTION INTRAVENOUS; SUBCUTANEOUS
Qty: 60 ML | Refills: 3 | Status: SHIPPED | OUTPATIENT
Start: 2022-07-27

## 2022-07-27 RX ORDER — PEN NEEDLE, DIABETIC 31 GX5/16"
NEEDLE, DISPOSABLE MISCELLANEOUS
Qty: 200 EACH | Refills: 5 | Status: SHIPPED | OUTPATIENT
Start: 2022-07-27

## 2022-07-27 RX ORDER — LEVOTHYROXINE SODIUM 0.15 MG/1
TABLET ORAL
Qty: 90 TABLET | Refills: 1 | Status: SHIPPED | OUTPATIENT
Start: 2022-07-27

## 2022-07-27 NOTE — PROGRESS NOTES
Macario Colón is a 47 y.o. female is seen for the management of uncontrolled  type 2 diabetes and hypothyroidism . Patient has complex medical problems including coronary artery disease , DOMENIC, CHF, breast cancer, fibromyalgia, hyperlipidemia, hypertension, obesity, asthma and depression  T2DM which is uncontrolled and is complicated with nephropathy and DOMENIC . She got diabetic education in the past and at one point she was on an insulin pump. She still has hypoglycemia awareness tends to watch her diet somewhat but her depression hinders in managing her diabetes. Most of her medications are managed by her . Patricia Solis was diagnosed with hypothyroidism in march 2010 and has been on Lt4 since then   she had GDM with her 2 kids ---she has been on insulin for years      Patricia Solis also has hypertension , GERD , hyperlipidemia ,she also has breast cancer s/p mastectomy and radiation and chemo &is on Tamixifen   she also has Asthma she is on Cpap for sleep apnea and wears Oxygen   She follows with a rheumatologist and is on hydroxychloroquine, previously was on methotrexate    INTERIM:    Diabetes  She presents for her follow-up diabetic visit. She has type 2 diabetes mellitus. No MedicAlert identification noted. The initial diagnosis of diabetes was made 17 years ago. Her disease course has been stable. Hypoglycemia symptoms include nervousness/anxiousness and sweats. Pertinent negatives for hypoglycemia include no dizziness or headaches. Associated symptoms include fatigue and weakness. There are no hypoglycemic complications. Symptoms are stable. Diabetic complications include autonomic neuropathy, heart disease and peripheral neuropathy. Risk factors for coronary artery disease include diabetes mellitus, post-menopausal, hypertension and dyslipidemia. Current diabetic treatment includes intensive insulin program. She is compliant with treatment some of the time. Her weight is stable.  She is following a generally healthy diet. Meal planning includes avoidance of concentrated sweets. She has had a previous visit with a dietitian. She rarely participates in exercise. There is no change in her home blood glucose trend. Her breakfast blood glucose is taken between 8-9 am. Her breakfast blood glucose range is generally 140-180 mg/dl. Her lunch blood glucose is taken between 12-1 pm. Her lunch blood glucose range is generally 140-180 mg/dl. Her dinner blood glucose is taken between 7-8 pm. Her dinner blood glucose range is generally >200 mg/dl. An ACE inhibitor/angiotensin II receptor blocker is being taken. She sees a podiatrist.Eye exam is current. No recent A1c she did blood work for her oncologist and cardiologist  She follows with pain management at [facility]   Patient continues to have chronic swelling in her extremities specifically in the right arm since her breast cancer surgery. No worsening noted. She is closely monitored by cardiology and is on torsemide and gets weekly renal panel and BNP. She denies any significant hypoglycemia since last visit, still frustrated about fluctuation in glucose despite patient not eating anything there could be wide fluctuations from 150-400 according to the  he does admit that the patient is under a lot of stress and probably some of the medications she takes could contribute to high glucose, she is not taking any steroids. Her fingerstick blood glucose running high     Weight trend: stable  Prior visit with dietician: yes  Current diet: on average, 3 meals per day  Current exercise: rare    Has there been any hospitalization, surgery or major illness since the last visit?  Yes   Has there been any new school, family or social problems since the last visit?  no   Has there been any new family history of members with diabetes, heart disease, stroke, or endocrine related problems since the last visit?  no    Past Medical History:   Diagnosis Date    Anxiety Anxiety and depression     Arthritis     not sure of specific type    Asthma     CAD (coronary artery disease)     Cancer ([facility] [address] 2007    left breast    Cerebral artery occlusion with cerebral infarction ([facility] [address]     2000, 2001    CHF (congestive heart failure) ([facility] [address] 2018    Chronic kidney disease     renal insufficency/to see Dr Rashid Stallings    Chronic pain     Constipation     COPD (chronic obstructive pulmonary disease) ([facility] [address]     Depression     Diabetes mellitus ([facility] [address]     Diabetic polyneuropathy associated with type 2 diabetes mellitus ([facility] [address] 2/2/2018    Dysthymia 2/2/2018    ESBL (extended spectrum beta-lactamase) producing bacteria infection 03/14/2018    urine    Fibromyalgia     Gastric ulcer, unspecified as acute or chronic, without mention of hemorrhage, perforation, or obstruction     GERD (gastroesophageal reflux disease)     Gout     HIGH CHOLESTEROL     Hypertension     Hypothyroidism     Severe persistent asthma without complication 4/0/2555    Thyroid disease     TIA (transient ischemic attack)     with occasional left leg and hand weakness      Patient Active Problem List   Diagnosis    Fibromyalgia    Iron deficiency anemia    History of breast cancer    Chronic pain    Lymphedema of upper extremity following lymphadenectomy    Encounter for monitoring aromatase inhibitor therapy    Encounter for follow-up surveillance of breast cancer    Hyperlipidemia LDL goal <70    Essential hypertension    Poorly controlled type 2 diabetes mellitus ([address]    Asthma    Hypothyroidism    Anxiety and depression    Hx of breast cancer    Hypoxia    SOB (shortness of breath)    Hiatal hernia with GERD    Obstructive sleep apnea syndrome    Coronary artery disease involving native coronary artery of native heart without angina pectoris    Severe persistent asthma without complication    Diabetic polyneuropathy associated with type 2 diabetes mellitus ([address]    Chronic congestive heart failure (HCC)    Chronic heart Smoking status: Never    Smokeless tobacco: Never   Vaping Use    Vaping Use: Never used   Substance and Sexual Activity    Alcohol use: No    Drug use: No    Sexual activity: Not Currently   Other Topics Concern    Not on file   Social History Narrative    Not on file     Social Determinants of Health     Financial Resource Strain: Low Risk     Difficulty of Paying Living Expenses: Not hard at all   Food Insecurity: No Food Insecurity    Worried About Running Out of Food in the Last Year: Never true    Ran Out of Food in the Last Year: Never true   Transportation Needs: Not on file   Physical Activity: Not on file   Stress: Not on file   Social Connections: Not on file   Intimate Partner Violence: Not on file   Housing Stability: Not on file     Family History   Problem Relation Age of Onset    Asthma Other     Cancer Other     Depression Other     Diabetes Other     Hypertension Other     High Cholesterol Other     Migraines Other     Heart Attack Father 72         of MI    High Blood Pressure Mother     Diabetes Mother      Current Outpatient Medications   Medication Sig Dispense Refill    Continuous Blood Gluc Sensor (FREESTYLE EMERALD 2 SENSOR) MISC Change every 14 days 2 each 5    insulin aspart (NOVOLOG FLEXPEN) 100 UNIT/ML injection pen inject 30 to 50 units into the skin three time daily before meals 60 mL 3    Insulin Pen Needle (B-D UF III MINI PEN NEEDLES) 31G X 5 MM MISC use five times daily with insulin 200 each 5    levothyroxine (SYNTHROID) 150 MCG tablet TAKE 1 TABLET BY MOUTH IN THE MORNING before breakfast 90 tablet 1    vitamin D3 (CHOLECALCIFEROL) 25 MCG (1000 UT) TABS tablet TAKE 3 TABLETS BY MOUTH ONE TIME A DAY 90 tablet 5    Febuxostat 80 MG TABS TAKE 1 TABLET BY MOUTH EVERY DAY 90 tablet 0    STEGLATRO 5 MG TABS TAKE 1 TABLET BY MOUTH EVERY DAY 90 tablet 1    Insulin Degludec (TRESIBA FLEXTOUCH) 200 UNIT/ML SOPN INJECT 200 UNITS SUBCUTANEOUSLY ONE TIME DAILY 54 mL 1    FEROSUL 325 (65 Fe) MG tablet TAKE 1 TABLET BY MOUTH TWO TIMES A DAY WITH MEALS 180 tablet 0    Blood Glucose Monitoring Suppl (Mason Mcneil) w/Device KIT Use to check glucose 4 times daily. 1 kit 0    lubiprostone (AMITIZA) 24 MCG capsule TAKE 1 CAPSULE BY MOUTH TWO TIMES A DAY WITH MEALS 60 capsule 0    montelukast (SINGULAIR) 10 MG tablet TAKE 1 TABLET BY MOUTH nightly 30 tablet 0    Lidocaine 5 % CREA Apply to feet QID prn pain for peripheral neuropathy 30 g 5    butalbital-acetaminophen-caffeine (FIORICET, ESGIC) -40 MG per tablet Take 1 tablet by mouth every 6 hours as needed for Headaches 30 tablet 1    rosuvastatin (CRESTOR) 10 MG tablet Take 1 tablet by mouth nightly 90 tablet 3    leflunomide (ARAVA) 20 MG tablet TAKE 1 TABLET BY MOUTH EVERY DAY 90 tablet 0    pregabalin (LYRICA) 75 MG capsule Take 1 cap twice a day 60 capsule 2    sulfaSALAzine (AZULFIDINE) 500 MG tablet Take 1 tablet by mouth two times daily 180 tablet 0    colchicine (COLCRYS) 0.6 MG tablet Take 1 tablet by mouth every 48 hours 45 tablet 1    ranolazine (RANEXA) 500 MG extended release tablet TAKE 1 TABLET BY MOUTH TWO TIMES A  tablet 3    SM SENNA-S 8.6-50 MG per tablet TAKE 2 TABLETS BY MOUTH TWO TIMES A  tablet 0    torsemide (DEMADEX) 100 MG tablet TAKE 1 TABLET BY MOUTH IN THE MORNING AND 1/2 TABLET IN THE EVENING. 135 tablet 0    spironolactone (ALDACTONE) 50 MG tablet TAKE 2 TABLETS BY MOUTH IN THE MORNING AND THEN TAKE 1 TABLET BY MOUTH IN THE EVENING 270 tablet 3    Urea 40 % LOTN Apply to feet nightly and cover with Aquaphor 325 mL 5    mineral oil-hydrophilic petrolatum (HYDROPHOR) ointment Apply topically as needed to hands and feet. 454 g 5    Compression Stockings MISC by Does not apply route Zippered stockings for daily use on lower extremities (do not wear at night). 20-30mmHg.  1 each 1    ONETOUCH VERIO strip use to test blood sugar 5 times daily 200 strip 5    metOLazone (ZAROXOLYN) 2.5 MG tablet TAKE 1 TABLET BY MOUTH TWO TIMES A WEEK AS NEEDED FOR WEIGHT OVER 180 POUNDS. DO NOT TAKE 2 DAYS IN A ROW. (Patient taking differently: Take 2.5 mg by mouth twice a week) 24 tablet 0    Continuous Blood Gluc  (FREESTYLE EMERALD 2 READER) MINDY To check glucose levels 1 each 0    omeprazole (PRILOSEC) 20 MG delayed release capsule TAKE 1 CAPSULE BY MOUTH TWO TIMES A DAY 60 capsule 5    metoprolol tartrate (LOPRESSOR) 25 MG tablet TAKE 1 TABLET BY MOUTH TWO TIMES A  tablet 0    albuterol (PROVENTIL) (2.5 MG/3ML) 0.083% nebulizer solution Take 3 mLs by nebulization every 6 hours as needed for Wheezing or Shortness of Breath 120 each 1    polyethylene glycol (GLYCOLAX) 17 GM/SCOOP powder TAKE 17 GRAMS (1 CAPFUL) BY MOUTH NIGHTLY 510 g 0    LINZESS 290 MCG CAPS capsule TAKE 1 CAPSULE BY MOUTH ONE TIME A DAY FOR 90 DAYS      REXULTI 2 MG TABS tablet Take 2 mg by mouth at bedtime       methocarbamol (ROBAXIN-750) 750 MG tablet Take 1 tablet by mouth 3 times daily as needed (pain and spasm) 30 tablet 0    nitroGLYCERIN (NITROSTAT) 0.4 MG SL tablet PLACE ONE TABLET UNDER TONGUE AS NEEDED FOR CHEST PAIN, MAY REPEAT EVERY 5 MINUTES AS NEEDED UP TO A MAX OF 3 TABLETS. IF NO RELIEF AFTER 1 DOSE, CALL 911. 25 tablet 0    nystatin (MYCOSTATIN) 671126 UNIT/ML suspension Take 5 mLs by mouth 4 times daily 500 mL 1    Magic Mouthwash (MIRACLE MOUTHWASH) Swish and spit 5 mLs 4 times daily as needed for Irritation or Pain 300 mL 2    traZODone (DESYREL) 50 MG tablet Take 1 tablet by mouth nightly Take it on the day of sleep study 1 tablet 0    loratadine (CLARITIN) 10 MG tablet TAKE 1 TABLET BY MOUTH ONE TIME A DAY  30 tablet 5    ketoconazole (NIZORAL) 2 % shampoo Apply topically daily as needed. 120 mL 1    diazePAM (VALIUM) 5 MG tablet Take 5 mg by mouth 2 times daily as needed for Anxiety.        OXYGEN Inhale 2 L into the lungs nightly Sometimes with activity      oxyCODONE (OXYCONTIN) 20 MG extended release tablet Take 20 mg by mouth every 12 hours. aspirin 81 MG EC tablet Take 81 mg by mouth daily      benztropine (COGENTIN) 1 MG tablet Take 1 mg by mouth nightly       folic acid (FOLVITE) 1 MG tablet Take 1 mg by mouth daily      oxyCODONE-acetaminophen (PERCOCET)  MG per tablet Take 1 tablet by mouth every 4 hours as needed for Pain . Earliest Fill Date: 17 100 tablet 0    duloxetine (CYMBALTA) 60 MG capsule Take 60 mg by mouth 2 times daily. No current facility-administered medications for this visit. Allergies   Allergen Reactions    Iv Dye [Iodides] Anaphylaxis     allergic to ct scan dye, not the MRI    Diazepam Other (See Comments)    Insulin Glargine Other (See Comments)     High blood sugar     Trazodone And Nefazodone Other (See Comments)     Makes patient feel very sluggish     Family Status   Relation Name Status    Other family h/o Alive    Father      Mother  Alive    Sister  Alive    Brother  Alive   . OBJECTIVE:  There were no vitals taken for this visit.    Wt Readings from Last 3 Encounters:   22 188 lb 3.2 oz (85.4 kg)   22 187 lb 3.2 oz (84.9 kg)   22 197 lb 3.2 oz (89.4 kg)     Constitutional: no acute distress, well appearing and well nourished  Psychiatric: oriented to person, place and time, judgement and insight and normal, recent and remote memory intact and mood and affect are normal  Skin: skin and subcutaneous tissue is normal without visible mass,   Head and Face: visual inspection  of head and face revealed no abnormalities  Eyes: visual inspection showed no lid or conjunctival swelling, erythema or discharge, pupils are normal, equal, round  Ears/Nose: external inspection of ears and nose revealed no abnormalities, hearing is grossly normal  Oropharynx/Mouth/Face: lips, tongue and gums appear  normal with no lesions, the voice quality was normal  Neck: neck appears symmetric, with no visible masses,   Pulmonary: no increased work of breathing or signs of respiratory distress,  Musculoskeletal: normal on inspection    Neurological: normal coordination and normal general cortical function        Lab Results   Component Value Date/Time    LABA1C 7.8 03/15/2022 03:15 PM    LABA1C 8.6 02/02/2021 12:10 PM    LABA1C 8.4 01/26/2021 09:30 AM         ASSESSMENT/PLAN:    ---- Type 2 diabetes mellitus with  complication, with long-term current use of insulin also has DAN and gastroparesis --UNCONTROLLED 8.5>>7.3>>7.8>>8.4  Last A1c was 7.8 in March 2022    Tresiba dose will be increased to 160 units at bedtime, discussed continuous glucose sensor as well  ---we discussed U 500 in the past  -- We sent prescription for freestyle madai  According to  fingerstick blood glucose are fluctuating between 145--300   --- Patient tends to take 30-50 units of Humalog with the meals and bases it on her blood sugar log and the amount of food   ---VGO not approved   -- on Steglatro as Jardiance was not approved with her insurance  She was cautioned about dehydration associated with Steglatro on top of her other diuretics she gets weekly blood work which is managed by cardiology.     --- Due to her hypoxemia and MARIA D I stopped the metformin In January 2018  --- Glucotrol XL stopped  --- due to worsening creatine     ---Her depression has been hindering in her optimal glycemic control --- patient tells me that she eat only one meal a day and that also has minimal carbs ---advised to counts her snack at all she tends to snack on dates and fruits which both can lead to elevated blood glucose she typically does not take any short acting insulin for the snacks she was advised to check her fingerstick more often  We had a lengthy discussion about these issues ---she is reliant on her  to help with diabetes care   we had a lengthy discussion about glycmeic goals and treatment options   Hypoglycemia protocol reviewed in detail with patient Patient was advised to carry glucose tablets and also have glucagon emergency kit. Provided with RX for glucagon. Health maintenance  -advised to have annual dilated eye exam uptodate pos retinopathy follows with Dr Eliseo Regan last visit in 2019  -last microalb/creat ratio elevated at >800 will recheck unable to calculate she was advised to follow with nephrology she previously saw Dr. Vijay Ryan  Currently not seeing a nephrologist for over a year today we discussed that    foot exam --- she saw a podiatrist who gave her steroid shot she goes for regular follow up with her --last visit in March 2021    --- Previously episodes of hot flashes  She is noted to have [address] level less than 0.1, FSH level of 2.2, plasma metanephrines in the normal range in December 2019  Her IGF-I level was elevated I will repeat with the next lab    --- Hypothyroidism    she is on 150 mcg daily continue on same dose   ----she had thyroid hemiagensis left lobe absent   --She has been issues with swallowing --- had a CT scan of neck done which revealed normal thyroid normal submandibular and normal architecture.      CKD stage 3  EGFR 34  Advised to follow-up with nephrology    --- Mixed hyperlipidemia  On statins   Last TG high and LDL was 115 in April 2018   Encouraged to work on diet and reduce the amount of fat in her diet as well as work on carbohydrate restriction      --- Hx of breast cancer  Follows with onc   On tamoxifen diet 3 modification which apparently will be stopped in 2019    ---Primary fibromyalgia  On Lyrica follows with Dr Kris Nguyen     --Malignant neoplasm of overlapping sites of left female breast, unspecified estrogen receptor status ([facility] [address]  Breast cancer s/p surgery and chemo 2008   She is on tamoxifen which according to pt gives her pain in her arms and hence she takes percocet     --- Essential hypertension  Controlled now   Continue with current regimen    Patient denies any chest pain any shortness of breath    ----Coronary artery disease involving native coronary artery of native heart without angina pectoris  Stable follows with Dr Alia Ham     --- Lymphedema of upper extremity following lymphadenectomy  Stable     ---- Dysthymia  Depression   She is on Latuda    Her dose has been adjusted   She appears to be alert and oriented to day but was very tearful    ---Asthma without complication  Stable    ---Diabetic polyneuropathy associated with type 2 diabetes mellitus ([facility] [address]  Stable  She is on Lyrica and was on Cymbalta in the past       ---Rh Arthritis   She is on methotrexate and Arava  Plaquenil is also in her med list     ---?Wiliam Mcallister   Follows with cardiology continues to have multiple admission due to worsening of congestive heart failureshe is on demadex , sprinoloactone and metolazone         Reviewed and/or ordered clinical lab results Yes  Reviewed and/or ordered radiology tests Yes  Reviewed and/or ordered other diagnostic tests Yes  Made a decision to obtain old records Yes  Reviewed and summarized old records Yes        Minal Ramey was counseled regarding symptoms of current diagnosis, course and complications of disease if inadequately treated, side effects of medications, diagnosis, treatment options, and prognosis, risks, benefits, complications, and alternatives of treatment, labs, imaging and other studies and treatment targets and goals. She understands instructions and counseling. These diagnosis were discussed and reviewed with the patient including the advantages of drug therapy. She was counseled at this visit on the following: diabetes complication prevention and foot care.     TELEHEALTH EVALUATION -- Audio/Visual (During SVVFD-19 public health emergency)  Pursuant to the emergency declaration under the [facility], [address] [place] [address] and the [employer] and [employer] Act, this Virtual  Visit was conducted, with patient's consent, to reduce the patient's risk of exposure to COVID-19 and provide care for  patient. Patient identification was verified and the caregiver was present when appropriate. The patient was located in the state where the provider is licensed to practice. The patient and/or guardian are aware that this is a billable service which includes applicable co-pays. This virtual/audio visit was conducted with patient and/or legal guardian's consent. Total time spent : 20+ reviewing the chart, conducting an interview, performing a limited exam by video and educating the patient on my assessment plan. No follow-ups on file.

## 2022-08-02 DIAGNOSIS — M1A.09X0 IDIOPATHIC CHRONIC GOUT OF MULTIPLE SITES WITHOUT TOPHUS: ICD-10-CM

## 2022-08-03 ENCOUNTER — OFFICE VISIT (OUTPATIENT)
Dept: VASCULAR SURGERY | Age: 52
End: 2022-08-03
Payer: COMMERCIAL

## 2022-08-03 VITALS
DIASTOLIC BLOOD PRESSURE: 70 MMHG | BODY MASS INDEX: 32.1 KG/M2 | HEIGHT: 64 IN | SYSTOLIC BLOOD PRESSURE: 110 MMHG | WEIGHT: 188 LBS

## 2022-08-03 DIAGNOSIS — R60.0 BILATERAL LEG EDEMA: Primary | ICD-10-CM

## 2022-08-03 PROCEDURE — G8417 CALC BMI ABV UP PARAM F/U: HCPCS | Performed by: SURGERY

## 2022-08-03 PROCEDURE — 99204 OFFICE O/P NEW MOD 45 MIN: CPT | Performed by: SURGERY

## 2022-08-03 PROCEDURE — 3017F COLORECTAL CA SCREEN DOC REV: CPT | Performed by: SURGERY

## 2022-08-03 PROCEDURE — 1036F TOBACCO NON-USER: CPT | Performed by: SURGERY

## 2022-08-03 PROCEDURE — G8427 DOCREV CUR MEDS BY ELIG CLIN: HCPCS | Performed by: SURGERY

## 2022-08-03 RX ORDER — LUBIPROSTONE 24 UG/1
CAPSULE, GELATIN COATED ORAL
Qty: 60 CAPSULE | Refills: 5 | Status: SHIPPED | OUTPATIENT
Start: 2022-08-03

## 2022-08-03 RX ORDER — MONTELUKAST SODIUM 10 MG/1
10 TABLET ORAL NIGHTLY
Qty: 30 TABLET | Refills: 5 | Status: SHIPPED | OUTPATIENT
Start: 2022-08-03 | End: 2022-08-22 | Stop reason: SDUPTHER

## 2022-08-03 ASSESSMENT — ENCOUNTER SYMPTOMS
EYES NEGATIVE: 1
SHORTNESS OF BREATH: 1
GASTROINTESTINAL NEGATIVE: 1
ALLERGIC/IMMUNOLOGIC NEGATIVE: 1

## 2022-08-03 NOTE — PROGRESS NOTES
Subjective:      Patient ID: Lacey Bishop is a 47 y.o. female. HPI Referral from [address] [place] [address] for evaluation of leg edema - worse on the right than left. No h/o DVT, skin breakdown/ulceration, varicose veins, cellulitis or bleeding.  (acting as primary history provider and ) reports that her swelling varies based upon her fluid status. If she gains water weight right leg swells. Currently she is in a mid range with reasonable control of her edema. History of left axillary lymph node dissection for breast cancer resulting in left arm lymphedema which is treated with compression sleeves and Lymphapress. Currently the patient is using a Lymphapress on the right leg as well but only once a day. Has worn compression stockings in the right leg but found them to be difficult to manage because of size of her leg and difficulty keeping them up.     Past Medical History:   Diagnosis Date    Anxiety     Anxiety and depression     Arthritis     not sure of specific type    Asthma     CAD (coronary artery disease)     Cancer ([address] 2007    left breast    Cerebral artery occlusion with cerebral infarction ([address]     2000, 2001    CHF (congestive heart failure) ([address] 2018    Chronic kidney disease     renal insufficency/to see Dr Chayo Nicole    Chronic pain     Constipation     COPD (chronic obstructive pulmonary disease) ([address]     Depression     Diabetes mellitus ([address]     Diabetic polyneuropathy associated with type 2 diabetes mellitus ([address] 2/2/2018    Dysthymia 2/2/2018    ESBL (extended spectrum beta-lactamase) producing bacteria infection 03/14/2018    urine    Fibromyalgia     Gastric ulcer, unspecified as acute or chronic, without mention of hemorrhage, perforation, or obstruction     GERD (gastroesophageal reflux disease)     Gout     HIGH CHOLESTEROL     Hypertension     Hypothyroidism     Severe persistent asthma without complication 3/9/2983    Thyroid disease     TIA (transient ischemic attack)     with occasional left leg and hand weakness     Past Surgical History:   Procedure Laterality Date    BREAST SURGERY      left mastectomy    CARPAL TUNNEL RELEASE      Bilateral    FINGER CONTRACTURE SURGERY      HYSTERECTOMY (CERVIX STATUS UNKNOWN)  2005    USO    MASTECTOMY      TONSILLECTOMY      UPPER GASTROINTESTINAL ENDOSCOPY  2019    stretched esophagous      Allergies   Allergen Reactions    Iv Dye [Iodides] Anaphylaxis     allergic to ct scan dye, not the MRI    Diazepam Other (See Comments)    Insulin Glargine Other (See Comments)     High blood sugar     Trazodone And Nefazodone Other (See Comments)     Makes patient feel very sluggish     Current Outpatient Medications   Medication Sig Dispense Refill    montelukast (SINGULAIR) 10 MG tablet TAKE 1 TABLET BY MOUTH NIGHTLY 30 tablet 5    lubiprostone (AMITIZA) 24 MCG capsule TAKE 1 CAPSULE BY MOUTH TWO TIMES A DAY WITH MEALS 60 capsule 5    Continuous Blood Gluc Sensor (FREESTYLE EMERALD 2 SENSOR) MISC Change every 14 days 2 each 5    insulin aspart (NOVOLOG FLEXPEN) 100 UNIT/ML injection pen inject 30 to 50 units into the skin three time daily before meals 60 mL 3    Insulin Pen Needle (B-D UF III MINI PEN NEEDLES) 31G X 5 MM MISC use five times daily with insulin 200 each 5    levothyroxine (SYNTHROID) 150 MCG tablet TAKE 1 TABLET BY MOUTH IN THE MORNING before breakfast 90 tablet 1    vitamin D3 (CHOLECALCIFEROL) 25 MCG (1000 UT) TABS tablet TAKE 3 TABLETS BY MOUTH ONE TIME A DAY 90 tablet 5    Febuxostat 80 MG TABS TAKE 1 TABLET BY MOUTH EVERY DAY 90 tablet 0    STEGLATRO 5 MG TABS TAKE 1 TABLET BY MOUTH EVERY DAY 90 tablet 1    Insulin Degludec (TRESIBA FLEXTOUCH) 200 UNIT/ML SOPN INJECT 200 UNITS SUBCUTANEOUSLY ONE TIME DAILY 54 mL 1    FEROSUL 325 (65 Fe) MG tablet TAKE 1 TABLET BY MOUTH TWO TIMES A DAY WITH MEALS 180 tablet 0    Blood Glucose Monitoring Suppl (ONETOUCH VERIO) w/Device KIT Use to check glucose 4 times daily.  1 kit 5    metoprolol tartrate (LOPRESSOR) 25 MG tablet TAKE 1 TABLET BY MOUTH TWO TIMES A  tablet 0    albuterol (PROVENTIL) (2.5 MG/3ML) 0.083% nebulizer solution Take 3 mLs by nebulization every 6 hours as needed for Wheezing or Shortness of Breath 120 each 1    polyethylene glycol (GLYCOLAX) 17 GM/SCOOP powder TAKE 17 GRAMS (1 CAPFUL) BY MOUTH NIGHTLY 510 g 0    LINZESS 290 MCG CAPS capsule TAKE 1 CAPSULE BY MOUTH ONE TIME A DAY FOR 90 DAYS      REXULTI 2 MG TABS tablet Take 2 mg by mouth at bedtime       methocarbamol (ROBAXIN-750) 750 MG tablet Take 1 tablet by mouth 3 times daily as needed (pain and spasm) 30 tablet 0    nitroGLYCERIN (NITROSTAT) 0.4 MG SL tablet PLACE ONE TABLET UNDER TONGUE AS NEEDED FOR CHEST PAIN, MAY REPEAT EVERY 5 MINUTES AS NEEDED UP TO A MAX OF 3 TABLETS. IF NO RELIEF AFTER 1 DOSE, CALL 911. 25 tablet 0    nystatin (MYCOSTATIN) 238401 UNIT/ML suspension Take 5 mLs by mouth 4 times daily 500 mL 1    Magic Mouthwash (MIRACLE MOUTHWASH) Swish and spit 5 mLs 4 times daily as needed for Irritation or Pain 300 mL 2    traZODone (DESYREL) 50 MG tablet Take 1 tablet by mouth nightly Take it on the day of sleep study 1 tablet 0    loratadine (CLARITIN) 10 MG tablet TAKE 1 TABLET BY MOUTH ONE TIME A DAY  30 tablet 5    ketoconazole (NIZORAL) 2 % shampoo Apply topically daily as needed. 120 mL 1    diazePAM (VALIUM) 5 MG tablet Take 5 mg by mouth 2 times daily as needed for Anxiety. OXYGEN Inhale 2 L into the lungs nightly Sometimes with activity      oxyCODONE (OXYCONTIN) 20 MG extended release tablet Take 20 mg by mouth every 12 hours. aspirin 81 MG EC tablet Take 81 mg by mouth daily      benztropine (COGENTIN) 1 MG tablet Take 1 mg by mouth nightly       folic acid (FOLVITE) 1 MG tablet Take 1 mg by mouth daily      oxyCODONE-acetaminophen (PERCOCET)  MG per tablet Take 1 tablet by mouth every 4 hours as needed for Pain .  Earliest Fill Date: 6/8/17 100 tablet 0 duloxetine (CYMBALTA) 60 MG capsule Take 60 mg by mouth 2 times daily. No current facility-administered medications for this visit. Social History     Socioeconomic History    Marital status:      Spouse name: Kalyan Win    Number of children: 3    Years of education: Not on file    Highest education level: Not on file   Occupational History    Occupation: disabled   Tobacco Use    Smoking status: Never    Smokeless tobacco: Never   Vaping Use    Vaping Use: Never used   Substance and Sexual Activity    Alcohol use: No    Drug use: No    Sexual activity: Not Currently   Other Topics Concern    Not on file   Social History Narrative    Not on file     Social Determinants of Health     Financial Resource Strain: Low Risk     Difficulty of Paying Living Expenses: Not hard at all   Food Insecurity: No Food Insecurity    Worried About Running Out of Food in the Last Year: Never true    Ran Out of Food in the Last Year: Never true   Transportation Needs: Not on file   Physical Activity: Not on file   Stress: Not on file   Social Connections: Not on file   Intimate Partner Violence: Not on file   Housing Stability: Not on file     Family History   Problem Relation Age of Onset    Asthma Other     Cancer Other     Depression Other     Diabetes Other     Hypertension Other     High Cholesterol Other     Migraines Other     Heart Attack Father 72         of MI    High Blood Pressure Mother     Diabetes Mother        Review of Systems   Constitutional:  Positive for fatigue and unexpected weight change. HENT: Negative. Eyes: Negative. Respiratory:  Positive for shortness of breath. Cardiovascular:  Positive for leg swelling. Gastrointestinal: Negative. Genitourinary: Negative. Musculoskeletal: Negative. Skin: Negative. Allergic/Immunologic: Negative. Neurological: Negative. Hematological: Negative. Psychiatric/Behavioral: Negative.        Objective:   Physical Exam  Vitals and nursing note reviewed. Exam conducted with a chaperone present (). Constitutional:       Appearance: She is obese. HENT:      Head: Normocephalic and atraumatic. Right Ear: External ear normal.      Left Ear: External ear normal.      Nose: Nose normal.      Mouth/Throat:      Pharynx: Oropharynx is clear. Eyes:      Conjunctiva/sclera: Conjunctivae normal.      Pupils: Pupils are equal, round, and reactive to light. Cardiovascular:      Rate and Rhythm: Normal rate and regular rhythm. Heart sounds: Normal heart sounds. Pulmonary:      Effort: Pulmonary effort is normal.      Breath sounds: Normal breath sounds. Abdominal:      General: Bowel sounds are normal.      Palpations: Abdomen is soft. There is no mass. Musculoskeletal:      Cervical back: Normal range of motion. Right lower leg: Edema (2+ from foot into knee area) present. Left lower leg: Edema (trace - 1+ edema) present. Comments: LUE edematous with L hand 3+ edema   Skin:     Capillary Refill: Capillary refill takes less than 2 seconds. Findings: No erythema, lesion or rash. Neurological:      General: No focal deficit present. Mental Status: She is alert and oriented to person, place, and time. Cranial Nerves: No cranial nerve deficit. Sensory: No sensory deficit. Motor: No weakness. Coordination: Coordination normal.      Gait: Gait normal.   Psychiatric:         Mood and Affect: Mood normal.         Behavior: Behavior normal.         Thought Content:  Thought content normal.         Judgment: Judgment normal.       Pulses:   R bruit  L bruit   2   carotid 2    2   brachial 2    2   radial 2    2   femoral 2    2   popliteal 2    2   posterior tibial 2    2   dorsalis pedis 2    na   bypass graft na        Assessment:      1) Chronic edema BLE (R>>L) - related to fluid status  2) Chronic lymphedema L arm S/P lymphadenectomy  3) CHF      Plan:      Symptomatic control of edema. Will f/u with [employer] for fitting of zippered TH 30/40 mmHg stocking. Wear every day. KH 20/30 mmHg LLE stocking. Lymphapress BID for 2 hours at a time.  provided with prescription. F/U as needed.         Renita Nolasco MD

## 2022-08-03 NOTE — Clinical Note
DR. Sheldon Madera,  I saw Alban Odom the office today for evaluation of her chronic left and right leg edema worse on the right than the left. At this point the best management would include continued use of her Lymphapress and placement in thigh-high 30-40 stockings which will be made custom with zippers. She will elevate her leg as possible and I will see her back in the future as needed. Family questions please feel free to contact me. Also let me know if I can help with any other patients in the future.   Divina García

## 2022-08-04 RX ORDER — FEBUXOSTAT 80 MG/1
TABLET, FILM COATED ORAL
Qty: 90 TABLET | Refills: 0 | OUTPATIENT
Start: 2022-08-04

## 2022-08-12 DIAGNOSIS — I25.10 CORONARY ARTERY DISEASE INVOLVING NATIVE CORONARY ARTERY OF NATIVE HEART WITHOUT ANGINA PECTORIS: ICD-10-CM

## 2022-08-12 DIAGNOSIS — K21.9 HIATAL HERNIA WITH GERD: ICD-10-CM

## 2022-08-12 DIAGNOSIS — K44.9 HIATAL HERNIA WITH GERD: ICD-10-CM

## 2022-08-12 RX ORDER — OMEPRAZOLE 20 MG/1
CAPSULE, DELAYED RELEASE ORAL
Qty: 60 CAPSULE | Refills: 0 | Status: SHIPPED | OUTPATIENT
Start: 2022-08-12 | End: 2022-08-31

## 2022-08-12 RX ORDER — INSULIN DEGLUDEC 200 U/ML
INJECTION, SOLUTION SUBCUTANEOUS
Qty: 54 ML | Refills: 3 | Status: SHIPPED | OUTPATIENT
Start: 2022-08-12

## 2022-08-16 RX ORDER — SIMVASTATIN 20 MG
TABLET ORAL
Qty: 90 TABLET | Refills: 0 | OUTPATIENT
Start: 2022-08-16

## 2022-08-22 ENCOUNTER — HOSPITAL ENCOUNTER (OUTPATIENT)
Dept: ONCOLOGY | Age: 52
Setting detail: INFUSION SERIES
Discharge: HOME | End: 2022-08-22
Payer: COMMERCIAL

## 2022-08-22 ENCOUNTER — TELEPHONE (OUTPATIENT)
Dept: CARDIOLOGY CLINIC | Age: 52
End: 2022-08-22

## 2022-08-22 ENCOUNTER — OFFICE VISIT (OUTPATIENT)
Dept: PULMONOLOGY | Age: 52
End: 2022-08-22
Payer: COMMERCIAL

## 2022-08-22 VITALS — OXYGEN SATURATION: 90 % | BODY MASS INDEX: 34.33 KG/M2 | WEIGHT: 200 LBS

## 2022-08-22 VITALS
HEART RATE: 67 BPM | SYSTOLIC BLOOD PRESSURE: 109 MMHG | RESPIRATION RATE: 16 BRPM | DIASTOLIC BLOOD PRESSURE: 60 MMHG | TEMPERATURE: 96 F

## 2022-08-22 DIAGNOSIS — I25.10 CORONARY ARTERY DISEASE INVOLVING NATIVE CORONARY ARTERY OF NATIVE HEART WITHOUT ANGINA PECTORIS: ICD-10-CM

## 2022-08-22 DIAGNOSIS — J96.11 CHRONIC RESPIRATORY FAILURE WITH HYPOXIA (HCC): ICD-10-CM

## 2022-08-22 DIAGNOSIS — E55.9 VITAMIN D DEFICIENCY: ICD-10-CM

## 2022-08-22 DIAGNOSIS — E03.2 HYPOTHYROIDISM DUE TO MEDICATION: ICD-10-CM

## 2022-08-22 DIAGNOSIS — N18.4 CKD (CHRONIC KIDNEY DISEASE) STAGE 4, GFR 15-29 ML/MIN (HCC): ICD-10-CM

## 2022-08-22 DIAGNOSIS — R06.02 SOB (SHORTNESS OF BREATH): ICD-10-CM

## 2022-08-22 DIAGNOSIS — G47.33 OSA (OBSTRUCTIVE SLEEP APNEA): ICD-10-CM

## 2022-08-22 DIAGNOSIS — J45.30 MILD PERSISTENT ASTHMA WITHOUT COMPLICATION: Primary | ICD-10-CM

## 2022-08-22 DIAGNOSIS — I10 ESSENTIAL HYPERTENSION: ICD-10-CM

## 2022-08-22 DIAGNOSIS — I50.32 CHRONIC HEART FAILURE WITH PRESERVED EJECTION FRACTION (HCC): ICD-10-CM

## 2022-08-22 DIAGNOSIS — E03.8 HYPOTHYROIDISM DUE TO HASHIMOTO'S THYROIDITIS: ICD-10-CM

## 2022-08-22 DIAGNOSIS — E06.3 HYPOTHYROIDISM DUE TO HASHIMOTO'S THYROIDITIS: ICD-10-CM

## 2022-08-22 LAB
A/G RATIO: 1 (ref 1.1–2.2)
A/G RATIO: 1.5 (ref 1.1–2.2)
ALBUMIN SERPL-MCNC: 3.8 G/DL (ref 3.4–5)
ALBUMIN SERPL-MCNC: 4 G/DL (ref 3.4–5)
ALP BLD-CCNC: 133 U/L (ref 40–129)
ALP BLD-CCNC: 135 U/L (ref 40–129)
ALT SERPL-CCNC: 12 U/L (ref 10–40)
ALT SERPL-CCNC: 14 U/L (ref 10–40)
ANION GAP SERPL CALCULATED.3IONS-SCNC: 11 MMOL/L (ref 3–16)
ANION GAP SERPL CALCULATED.3IONS-SCNC: 12 MMOL/L (ref 3–16)
AST SERPL-CCNC: 17 U/L (ref 15–37)
AST SERPL-CCNC: 18 U/L (ref 15–37)
BILIRUB SERPL-MCNC: <0.2 MG/DL (ref 0–1)
BILIRUB SERPL-MCNC: <0.2 MG/DL (ref 0–1)
BUN BLDV-MCNC: 39 MG/DL (ref 7–20)
BUN BLDV-MCNC: 41 MG/DL (ref 7–20)
CALCIUM SERPL-MCNC: 9.1 MG/DL (ref 8.3–10.6)
CALCIUM SERPL-MCNC: 9.3 MG/DL (ref 8.3–10.6)
CHLORIDE BLD-SCNC: 92 MMOL/L (ref 99–110)
CHLORIDE BLD-SCNC: 93 MMOL/L (ref 99–110)
CHOLESTEROL, TOTAL: 201 MG/DL (ref 0–199)
CO2: 32 MMOL/L (ref 21–32)
CO2: 33 MMOL/L (ref 21–32)
CREAT SERPL-MCNC: 1.5 MG/DL (ref 0.6–1.1)
CREAT SERPL-MCNC: 1.6 MG/DL (ref 0.6–1.1)
GFR AFRICAN AMERICAN: 41
GFR AFRICAN AMERICAN: 44
GFR NON-AFRICAN AMERICAN: 34
GFR NON-AFRICAN AMERICAN: 36
GLUCOSE BLD-MCNC: 199 MG/DL (ref 70–99)
GLUCOSE BLD-MCNC: 309 MG/DL (ref 70–99)
HCT VFR BLD CALC: 35.2 % (ref 36–48)
HDLC SERPL-MCNC: 31 MG/DL (ref 40–60)
HEMOGLOBIN: 11.5 G/DL (ref 12–16)
LDL CHOLESTEROL CALCULATED: ABNORMAL MG/DL
LDL CHOLESTEROL DIRECT: 93 MG/DL
MCH RBC QN AUTO: 28.2 PG (ref 26–34)
MCHC RBC AUTO-ENTMCNC: 32.6 G/DL (ref 31–36)
MCV RBC AUTO: 86.6 FL (ref 80–100)
PDW BLD-RTO: 13.8 % (ref 12.4–15.4)
PLATELET # BLD: 288 K/UL (ref 135–450)
PMV BLD AUTO: 8.3 FL (ref 5–10.5)
POTASSIUM SERPL-SCNC: 4.2 MMOL/L (ref 3.5–5.1)
POTASSIUM SERPL-SCNC: 4.8 MMOL/L (ref 3.5–5.1)
PRO-BNP: 192 PG/ML (ref 0–124)
PRO-BNP: 222 PG/ML (ref 0–124)
RBC # BLD: 4.07 M/UL (ref 4–5.2)
SODIUM BLD-SCNC: 136 MMOL/L (ref 136–145)
SODIUM BLD-SCNC: 137 MMOL/L (ref 136–145)
TOTAL PROTEIN: 6.7 G/DL (ref 6.4–8.2)
TOTAL PROTEIN: 7.5 G/DL (ref 6.4–8.2)
TRIGL SERPL-MCNC: 633 MG/DL (ref 0–150)
TSH SERPL DL<=0.05 MIU/L-ACNC: 1.79 UIU/ML (ref 0.27–4.2)
VITAMIN D 25-HYDROXY: 51.5 NG/ML
VLDLC SERPL CALC-MCNC: ABNORMAL MG/DL
WBC # BLD: 8.3 K/UL (ref 4–11)

## 2022-08-22 PROCEDURE — 96374 THER/PROPH/DIAG INJ IV PUSH: CPT

## 2022-08-22 PROCEDURE — 80053 COMPREHEN METABOLIC PANEL: CPT

## 2022-08-22 PROCEDURE — 6360000002 HC RX W HCPCS: Performed by: INTERNAL MEDICINE

## 2022-08-22 PROCEDURE — 83880 ASSAY OF NATRIURETIC PEPTIDE: CPT

## 2022-08-22 PROCEDURE — 1036F TOBACCO NON-USER: CPT | Performed by: INTERNAL MEDICINE

## 2022-08-22 PROCEDURE — G8427 DOCREV CUR MEDS BY ELIG CLIN: HCPCS | Performed by: INTERNAL MEDICINE

## 2022-08-22 PROCEDURE — 85027 COMPLETE CBC AUTOMATED: CPT

## 2022-08-22 PROCEDURE — 99214 OFFICE O/P EST MOD 30 MIN: CPT | Performed by: INTERNAL MEDICINE

## 2022-08-22 PROCEDURE — 99211 OFF/OP EST MAY X REQ PHY/QHP: CPT

## 2022-08-22 PROCEDURE — 2580000003 HC RX 258: Performed by: INTERNAL MEDICINE

## 2022-08-22 PROCEDURE — G8417 CALC BMI ABV UP PARAM F/U: HCPCS | Performed by: INTERNAL MEDICINE

## 2022-08-22 PROCEDURE — 3017F COLORECTAL CA SCREEN DOC REV: CPT | Performed by: INTERNAL MEDICINE

## 2022-08-22 RX ORDER — FUROSEMIDE 10 MG/ML
120 INJECTION INTRAMUSCULAR; INTRAVENOUS ONCE
Status: COMPLETED | OUTPATIENT
Start: 2022-08-22 | End: 2022-08-22

## 2022-08-22 RX ORDER — MONTELUKAST SODIUM 10 MG/1
10 TABLET ORAL NIGHTLY
Qty: 30 TABLET | Refills: 5 | Status: SHIPPED | OUTPATIENT
Start: 2022-08-22

## 2022-08-22 RX ORDER — SODIUM CHLORIDE 0.9 % (FLUSH) 0.9 %
5-40 SYRINGE (ML) INJECTION ONCE
Status: COMPLETED | OUTPATIENT
Start: 2022-08-22 | End: 2022-08-22

## 2022-08-22 RX ADMIN — FUROSEMIDE 120 MG: 10 INJECTION, SOLUTION INTRAMUSCULAR; INTRAVENOUS at 15:31

## 2022-08-22 RX ADMIN — Medication 10 ML: at 15:31

## 2022-08-22 NOTE — TELEPHONE ENCOUNTER
----- Message from Lottie Mina MD sent at 8/22/2022  4:47 PM EDT -----  Please call patient and let her know that her labs look similar to the last few sets of labs. Bnp (fluid indicator) is unchanged. Kidneys look okay. No changes.   ARETHA

## 2022-08-22 NOTE — PROGRESS NOTES
Patient to department for outpatient labs and lasix. Here from  MD office. Labs drawn followed by lasix 120mg at 20 mg per minute. Tolerated well. Patient aware that she will have urinary urgency and frequency. Also aware that this tx may lower her b/p and she may feel dizzy upon standing. All questions answered. To follow up with cardiology office.

## 2022-08-22 NOTE — PROGRESS NOTES
PULMONARY OFFICE FOLLOW UP NOTE    REASON FOR VISIT: No chief complaint on file. DATE OF VISIT: 8/22/2022    HISTORY OF PRESENT ILLNESS: 47y.o. year old female is here for follow-up of asthma, chronic hypoxic respiratory failure on home oxygen at 2 L/min, heart failure with preserved ejection fraction, obstructive sleep apnea not on CPAP/ BIPAP. she has PMH of history of breast cancer, depression, hypothyroidism, diabetes mellitus type 2, seronegative rheumatoid arthritis on leflunomide. Patient today states that she is been having increased shortness of breath. She did not wear her oxygen to the clinic today and her O2 was fluctuating anywhere from 89 to 92%. Patient states that she forgot to bring her oxygen. Her weight has increased to 200 pounds with increased edema of lower extremities. Patient's dry weight is around 185 pounds. She is on diuretics. Patient underwent BiPAP titration study in December 2020 and BiPAP 10/7 was prescribed. The prescription was sent to [facility]. Patient is not using BiPAP. She stated that she cannot tolerate BiPAP. She does not have the machine. Her bronchodilator regimen for asthma consist of Dulera 2 puffs twice daily along with albuterol inhaler as needed. She also use albuterol nebs twice a day. She also takes Singulair 10 mg daily. Patient was diagnosed with obstructive sleep apnea many years ago. She had 2 sleep studies, one 10 years ago and other close to 5 years ago. Her last sleep study was performed at [facility]. Her  states that she had two CPAP machines at home. She could not get used to the CPAP and stopped wearing them. She only wears oxygen to sleep at night. She underwent repeat baseline sleep study on 11/9/2020 that showed mild obstructive sleep apnea with AHI 5.8. However, patient did not sleep very well during the sleep study and did not get enough REM sleep.   She was saturating less than 90% 404 minutes. Because of patient's cardiovascular risk factors, CPAP titration study was recommended. She underwent CPAP titration study on 12/17/2028 and CPAP could not control LUIS. BiPAP 12/7 was prescribed. However, patient has not received her machine yet. Diagnostic test reviewed:  I reviewed the chest x-ray from 3/27/2020 and my interpretation is as follows. Increased pulmonary vascular congestion. I reviewed the PFT from 5/10/2018 and my interpretation is as follows. restrictive lung disease with reduced diffusion capacity. Echocardiogram from 9/20/2020 showed preserved EF of 55 to 60%     Sleep study from 11/9/2020 showed AHI of 5.8/h with saturation of 90% 404 minutes. Sleep study from 12/17/2020-BiPAP 10/7 controls sleep apnea adequately. REVIEW OF SYSTEMS:    CONSTITUTIONAL SYMPTOMS: The patient denies fever, fatigue, night sweats, weight loss or weight gain. HEENT: No vision changes. No tinnitus, Denies sinus pain. No hoarseness, or dysphagia. NECK: Patient denies swelling in the neck. CARDIOVASCULAR: Denies chest pain, palpitation, syncope. RESPIRATORY: See above. GASTROINTESTINAL: Denies nausea, abdominal pain or change in bowel function. GENITOURINARY: Denies obstructive symptoms. No history of incontinence. BREASTS: No masses or lumps in the breasts. SKIN: No rashes or itching. MUSCULOSKELETAL: Denies weakness or bone pain. NEUROLOGICAL: No headaches or seizures. PSYCHIATRIC: Denies mood swings or depression. ENDOCRINE: Denies heat or cold intolerance or excessive thirst.  HEMATOLOGIC/LYMPHATIC: Denies easy bruising or lymph node swelling. ALLERGIC/IMMUNOLOGIC: No environmental allergies.     PAST MEDICAL HISTORY:   Past Medical History:   Diagnosis Date    Anxiety     Anxiety and depression     Arthritis     not sure of specific type    Asthma     CAD (coronary artery disease)     Cancer ([facility] 2007    left breast    Cerebral artery occlusion with mL 3    omeprazole (PRILOSEC) 20 MG delayed release capsule TAKE 1 CAPSULE BY MOUTH TWO TIMES A DAY 60 capsule 0    lubiprostone (AMITIZA) 24 MCG capsule TAKE 1 CAPSULE BY MOUTH TWO TIMES A DAY WITH MEALS 60 capsule 5    Continuous Blood Gluc Sensor (FREESTYLE EMERALD 2 SENSOR) MISC Change every 14 days 2 each 5    insulin aspart (NOVOLOG FLEXPEN) 100 UNIT/ML injection pen inject 30 to 50 units into the skin three time daily before meals 60 mL 3    Insulin Pen Needle (B-D UF III MINI PEN NEEDLES) 31G X 5 MM MISC use five times daily with insulin 200 each 5    levothyroxine (SYNTHROID) 150 MCG tablet TAKE 1 TABLET BY MOUTH IN THE MORNING before breakfast 90 tablet 1    vitamin D3 (CHOLECALCIFEROL) 25 MCG (1000 UT) TABS tablet TAKE 3 TABLETS BY MOUTH ONE TIME A DAY 90 tablet 5    Febuxostat 80 MG TABS TAKE 1 TABLET BY MOUTH EVERY DAY 90 tablet 0    STEGLATRO 5 MG TABS TAKE 1 TABLET BY MOUTH EVERY DAY 90 tablet 1    FEROSUL 325 (65 Fe) MG tablet TAKE 1 TABLET BY MOUTH TWO TIMES A DAY WITH MEALS 180 tablet 0    Blood Glucose Monitoring Suppl (ONETOUCH VERIO) w/Device KIT Use to check glucose 4 times daily.  1 kit 0    Lidocaine 5 % CREA Apply to feet QID prn pain for peripheral neuropathy 30 g 5    butalbital-acetaminophen-caffeine (FIORICET, ESGIC) -40 MG per tablet Take 1 tablet by mouth every 6 hours as needed for Headaches 30 tablet 1    rosuvastatin (CRESTOR) 10 MG tablet Take 1 tablet by mouth nightly 90 tablet 3    leflunomide (ARAVA) 20 MG tablet TAKE 1 TABLET BY MOUTH EVERY DAY 90 tablet 0    pregabalin (LYRICA) 75 MG capsule Take 1 cap twice a day 60 capsule 2    sulfaSALAzine (AZULFIDINE) 500 MG tablet Take 1 tablet by mouth two times daily 180 tablet 0    colchicine (COLCRYS) 0.6 MG tablet Take 1 tablet by mouth every 48 hours 45 tablet 1    ranolazine (RANEXA) 500 MG extended release tablet TAKE 1 TABLET BY MOUTH TWO TIMES A  tablet 3    SM SENNA-S 8.6-50 MG per tablet TAKE 2 TABLETS BY MOUTH TWO TIMES A  tablet 0    torsemide (DEMADEX) 100 MG tablet TAKE 1 TABLET BY MOUTH IN THE MORNING AND 1/2 TABLET IN THE EVENING. 135 tablet 0    spironolactone (ALDACTONE) 50 MG tablet TAKE 2 TABLETS BY MOUTH IN THE MORNING AND THEN TAKE 1 TABLET BY MOUTH IN THE EVENING 270 tablet 3    Urea 40 % LOTN Apply to feet nightly and cover with Aquaphor 325 mL 5    mineral oil-hydrophilic petrolatum (HYDROPHOR) ointment Apply topically as needed to hands and feet. 454 g 5    Compression Stockings MISC by Does not apply route Zippered stockings for daily use on lower extremities (do not wear at night). 20-30mmHg. 1 each 1    ONETOUCH VERIO strip use to test blood sugar 5 times daily 200 strip 5    metOLazone (ZAROXOLYN) 2.5 MG tablet TAKE 1 TABLET BY MOUTH TWO TIMES A WEEK AS NEEDED FOR WEIGHT OVER 180 POUNDS. DO NOT TAKE 2 DAYS IN A ROW. (Patient taking differently: Take 2.5 mg by mouth twice a week) 24 tablet 0    Continuous Blood Gluc  (FREESTYLE EMERALD 2 READER) MINDY To check glucose levels 1 each 0    metoprolol tartrate (LOPRESSOR) 25 MG tablet TAKE 1 TABLET BY MOUTH TWO TIMES A  tablet 0    albuterol (PROVENTIL) (2.5 MG/3ML) 0.083% nebulizer solution Take 3 mLs by nebulization every 6 hours as needed for Wheezing or Shortness of Breath 120 each 1    polyethylene glycol (GLYCOLAX) 17 GM/SCOOP powder TAKE 17 GRAMS (1 CAPFUL) BY MOUTH NIGHTLY 510 g 0    LINZESS 290 MCG CAPS capsule TAKE 1 CAPSULE BY MOUTH ONE TIME A DAY FOR 90 DAYS      REXULTI 2 MG TABS tablet Take 2 mg by mouth at bedtime       methocarbamol (ROBAXIN-750) 750 MG tablet Take 1 tablet by mouth 3 times daily as needed (pain and spasm) 30 tablet 0    nitroGLYCERIN (NITROSTAT) 0.4 MG SL tablet PLACE ONE TABLET UNDER TONGUE AS NEEDED FOR CHEST PAIN, MAY REPEAT EVERY 5 MINUTES AS NEEDED UP TO A MAX OF 3 TABLETS.   IF NO RELIEF AFTER 1 DOSE, CALL 911. 25 tablet 0    nystatin (MYCOSTATIN) 139370 UNIT/ML suspension Take 5 mLs by mouth 4 times No wheezing, no crackles. Good air movement  GASTROINTESTINAL & ABDOMEN: Soft, nontender, positive bowel sounds in all quadrants, non-distended, without hepatosplenomegaly. GENITOURINARY: Deferred. MUSCULOSKELETAL: No tenderness to palpation of the axial skeleton. There is no clubbing. No cyanosis. No edema of the lower extremities. SKIN OF BODY: No rash or jaundice. PSYCHIATRIC EVALUATION: Normal affect. Patient answers questions appropriately. HEMATOLOGIC/LYMPHATIC/ IMMUNOLOGIC: No palpable lymphadenopathy. NEUROLOGIC: Alert and oriented x 3. Groslly non-focal. Motor strength is 5+/5 in all muscle groups. The patient has a normal sensorium globally. ASSESSMENT AND PLAN:     1. LUIS (obstructive sleep apnea)  Patient was diagnosed with obstructive sleep apnea many years ago. However, she could not get used to CPAP and stopped using it. She underwent a repeat sleep study on 11/9/2020 that showed mild AHI of 5.8/h. Patient was saturating less than 90% for 404 minutes. Unfortunately, patient did not have a adequate REM sleep. AHI was underestimated. Because of patient's cardiovascular risk factors including heart failure as well as obstructive sleep apnea symptoms including excessive daytime sleepiness and fatigue, CPAP titration study was recommended. Patient underwent CPAP titration study on 12/17/2020 and BiPAP 10/7 was prescribed. However, patient is not using BiPAP as she cannot tolerate the machine. 2. Chronic respiratory failure with hypoxia (HCC)  Likely secondary to heart failure and uncontrolled sleep apnea. There might be some component of obesity hypoventilation syndrome. Continue oxygen at 2 L/min. 3. Chronic heart failure with preserved ejection fraction [facility])  Patient states that her weight is up to 200 pounds. She is more short of breath and has more leg edema. Today she is going to stop by at cardiologist office for further assessment.      4. Mild persistent asthma without complication  Continue Dulera 2 puffs twice daily. Continue albuterol nebs q12. Continue Singulair 10 mg daily. Return in about 6 months (around 2/22/2023). Ro Meredith MD  Pulmonary Critical Care and Sleep Medicine  Electronically signed by Ro Meredith MD on 8/22/2022 at 1:52 PM     This note was completed using dragon medical speech recognition software. Grammatical errors, random word insertions, pronoun errors and incomplete sentences are occasional consequences of this technology due to software limitations. If there are questions or concerns about the content of this note of information contained within the body of this dictation they should be addressed with the provider for clarification.

## 2022-08-22 NOTE — TELEPHONE ENCOUNTER
Héctor Ventura, KARY   8/22/2022  5:27 PM EDT Back to [facility]      I spoke to Mr. Bonner and went over Dr. Delarosa Music message. He said she has urinated a few times since coming home from the infusion center. I told him to call us if any further concerns.

## 2022-08-22 NOTE — TELEPHONE ENCOUNTER
Patient's spouse is calling in to see if this has been addressed. Let Huyen Aguiar in the call center know we will call him back once ARETHA takes a look.

## 2022-08-22 NOTE — TELEPHONE ENCOUNTER
MrZach and Mrs. Vivek Quevedo came to the office (she in a wheelchair) after her amy't with Dr. Scott Diaz. Mr. Vivek Quevedo said that she is more swollen and pulse ox was 90%on room air at [facility] office. Her weight was 188# on 8/3/22. She has OV with Dr. Papo Pope tomorrow. ECHO and OV with Dr. Lyudmila Rajput 10/3/22. Discussed with Dr. Pillai More Go to infusion center for lasix 120mg IVP x1 dose. STAT labs: CMP, BNP, CBC. I spoke to Phill Chery at the [facility] ([phone]); she agreed for patient to go over. Orders have been faxed ([phone]). Mr. Vivek Quevedo wheeled his wife over via wheelchair.

## 2022-08-22 NOTE — TELEPHONE ENCOUNTER
Patients's weight is up to 200lbs. Patient would like to know if she should go to the [facility]. Also patient would like to know the results of her blood work. Please call and advise.

## 2022-08-23 ENCOUNTER — OFFICE VISIT (OUTPATIENT)
Dept: INTERNAL MEDICINE CLINIC | Age: 52
End: 2022-08-23
Payer: COMMERCIAL

## 2022-08-23 VITALS
HEART RATE: 82 BPM | OXYGEN SATURATION: 93 % | SYSTOLIC BLOOD PRESSURE: 142 MMHG | DIASTOLIC BLOOD PRESSURE: 86 MMHG | BODY MASS INDEX: 34.5 KG/M2 | WEIGHT: 201 LBS

## 2022-08-23 DIAGNOSIS — F33.2 SEVERE EPISODE OF RECURRENT MAJOR DEPRESSIVE DISORDER, WITHOUT PSYCHOTIC FEATURES (HCC): ICD-10-CM

## 2022-08-23 DIAGNOSIS — G44.211 INTRACTABLE EPISODIC TENSION-TYPE HEADACHE: ICD-10-CM

## 2022-08-23 DIAGNOSIS — I89.0 LYMPHEDEMA: ICD-10-CM

## 2022-08-23 DIAGNOSIS — I10 ESSENTIAL HYPERTENSION: Chronic | ICD-10-CM

## 2022-08-23 DIAGNOSIS — Z71.89 COMPLEX CARE COORDINATION: ICD-10-CM

## 2022-08-23 DIAGNOSIS — I50.32 CHRONIC HEART FAILURE WITH PRESERVED EJECTION FRACTION (HCC): ICD-10-CM

## 2022-08-23 DIAGNOSIS — K12.0 CANKER SORE: ICD-10-CM

## 2022-08-23 DIAGNOSIS — N32.81 OAB (OVERACTIVE BLADDER): ICD-10-CM

## 2022-08-23 DIAGNOSIS — R42 VERTIGO: Primary | ICD-10-CM

## 2022-08-23 LAB
ESTIMATED AVERAGE GLUCOSE: 200.1 MG/DL
HBA1C MFR BLD: 8.6 %

## 2022-08-23 PROCEDURE — 3017F COLORECTAL CA SCREEN DOC REV: CPT | Performed by: INTERNAL MEDICINE

## 2022-08-23 PROCEDURE — G8427 DOCREV CUR MEDS BY ELIG CLIN: HCPCS | Performed by: INTERNAL MEDICINE

## 2022-08-23 PROCEDURE — G8417 CALC BMI ABV UP PARAM F/U: HCPCS | Performed by: INTERNAL MEDICINE

## 2022-08-23 PROCEDURE — 99214 OFFICE O/P EST MOD 30 MIN: CPT | Performed by: INTERNAL MEDICINE

## 2022-08-23 PROCEDURE — 1036F TOBACCO NON-USER: CPT | Performed by: INTERNAL MEDICINE

## 2022-08-23 RX ORDER — SOLIFENACIN SUCCINATE 10 MG/1
10 TABLET, FILM COATED ORAL DAILY
Qty: 90 TABLET | Refills: 3 | Status: SHIPPED | OUTPATIENT
Start: 2022-08-23 | End: 2023-08-23

## 2022-08-23 RX ORDER — MECLIZINE HYDROCHLORIDE 25 MG/1
25 TABLET ORAL 3 TIMES DAILY PRN
Qty: 30 TABLET | Refills: 2 | Status: SHIPPED | OUTPATIENT
Start: 2022-08-23

## 2022-08-23 RX ORDER — TRIAMCINOLONE ACETONIDE 0.1 %
PASTE (GRAM) DENTAL
Qty: 5 G | Refills: 0 | Status: SHIPPED | OUTPATIENT
Start: 2022-08-23 | End: 2022-08-30

## 2022-08-23 NOTE — PROGRESS NOTES
E  Patient: Melissa Erazo is a 47 y.o. female who presents today with the following Chief Complaint(s):  Chief Complaint   Patient presents with    Diabetes     -want to talk about bladder  - wants to discuss labs      Depression       HPI    Here today for follow up. Patient's primary language is Greek. Interview is conducted via her , declined certified Greek . Worried as her weight is increasing. Has been getting dizzy \"a lot\" and has had some associated falls. Feels like world is spinning. Notices is when she stands up. Vertigo lasts for about 2 minutes when changing position from sitting to standing. Also gets dizzy when she turns her head to the left (but not right) and has associated HARDEN. Did see Dr. Shelia Mukherjee at [facility] [place] (ENT) regarding vertigo. Did not feel like her vertigo was related to her ear. Did not know that cuase of her vertigo. Blood pressure has not been high. Weight has increased 15 pounds over the past 3 days. Did reach out to Dr. Young Byrd who gave her an infusion of Lasix 120 mg IV x 1 yesterday. Is having issues with her bladder being over active. Needing to urinate about every 15 minutes or so and will only get a few drops. Would like to try medicine for her bladder. Has a cyst on her tongue. May be biting her tongue.      Results for orders placed or performed during the hospital encounter of 08/22/22   Comprehensive Metabolic Panel   Result Value Ref Range    Sodium 137 136 - 145 mmol/L    Potassium 4.8 3.5 - 5.1 mmol/L    Chloride 93 (L) 99 - 110 mmol/L    CO2 33 (H) 21 - 32 mmol/L    Anion Gap 11 3 - 16    Glucose 199 (H) 70 - 99 mg/dL    BUN 39 (H) 7 - 20 mg/dL    Creatinine 1.5 (H) 0.6 - 1.1 mg/dL    GFR Non- 36 (A) >60    GFR  44 (A) >60    Calcium 9.3 8.3 - 10.6 mg/dL    Total Protein 7.5 6.4 - 8.2 g/dL    Albumin 3.8 3.4 - 5.0 g/dL    Albumin/Globulin Ratio 1.0 (L) 1.1 - 2.2    Total Bilirubin <0.2 0.0 - 1.0 mg/dL    Alkaline Phosphatase 133 (H) 40 - 129 U/L    ALT 12 10 - 40 U/L    AST 18 15 - 37 U/L   Brain Natriuretic Peptide   Result Value Ref Range    Pro- (H) 0 - 124 pg/mL   CBC   Result Value Ref Range    WBC 8.3 4.0 - 11.0 K/uL    RBC 4.07 4.00 - 5.20 M/uL    Hemoglobin 11.5 (L) 12.0 - 16.0 g/dL    Hematocrit 35.2 (L) 36.0 - 48.0 %    MCV 86.6 80.0 - 100.0 fL    MCH 28.2 26.0 - 34.0 pg    MCHC 32.6 31.0 - 36.0 g/dL    RDW 13.8 12.4 - 15.4 %    Platelets 320 009 - 768 K/uL    MPV 8.3 5.0 - 10.5 fL     *Note: Due to a large number of results and/or encounters for the requested time period, some results have not been displayed. A complete set of results can be found in Results Review.           Allergies   Allergen Reactions    Iv Dye [Iodides] Anaphylaxis     allergic to ct scan dye, not the MRI    Diazepam Other (See Comments)    Insulin Glargine Other (See Comments)     High blood sugar     Trazodone And Nefazodone Other (See Comments)     Makes patient feel very sluggish      Past Medical History:   Diagnosis Date    Anxiety     Anxiety and depression     Arthritis     not sure of specific type    Asthma     CAD (coronary artery disease)     Cancer ([address] 2007    left breast    Cerebral artery occlusion with cerebral infarction ([address]     2000, 2001    CHF (congestive heart failure) ([address] 2018    Chronic kidney disease     renal insufficency/to see Dr Alexa Aden    Chronic pain     Constipation     COPD (chronic obstructive pulmonary disease) ([address]     Depression     Diabetes mellitus ([address]     Diabetic polyneuropathy associated with type 2 diabetes mellitus ([address] 2/2/2018    Dysthymia 2/2/2018    ESBL (extended spectrum beta-lactamase) producing bacteria infection 03/14/2018    urine    Fibromyalgia     Gastric ulcer, unspecified as acute or chronic, without mention of hemorrhage, perforation, or obstruction     GERD (gastroesophageal reflux disease)     Gout     HIGH CHOLESTEROL     Hypertension Hypothyroidism     Pneumonia 3/25/2020    Pyelonephritis     Severe persistent asthma without complication 5532    Thyroid disease     TIA (transient ischemic attack)     with occasional left leg and hand weakness      Past Surgical History:   Procedure Laterality Date    BREAST SURGERY      left mastectomy    CARPAL TUNNEL RELEASE      Bilateral    FINGER CONTRACTURE SURGERY      HYSTERECTOMY (CERVIX STATUS UNKNOWN)  2005    USO    MASTECTOMY      TONSILLECTOMY      UPPER GASTROINTESTINAL ENDOSCOPY  2019    stretched esophagous       Social History     Socioeconomic History    Marital status:      Spouse name: Luciano Beltran    Number of children: 3    Years of education: Not on file    Highest education level: Not on file   Occupational History    Occupation: disabled   Tobacco Use    Smoking status: Never    Smokeless tobacco: Never   Vaping Use    Vaping Use: Never used   Substance and Sexual Activity    Alcohol use: No    Drug use: No    Sexual activity: Not Currently   Other Topics Concern    Not on file   Social History Narrative    Not on file     Social Determinants of Health     Financial Resource Strain: Low Risk     Difficulty of Paying Living Expenses: Not hard at all   Food Insecurity: No Food Insecurity    Worried About Running Out of Food in the Last Year: Never true    Ran Out of Food in the Last Year: Never true   Transportation Needs: Not on file   Physical Activity: Not on file   Stress: Not on file   Social Connections: Not on file   Intimate Partner Violence: Not on file   Housing Stability: Not on file     Family History   Problem Relation Age of Onset    Asthma Other     Cancer Other     Depression Other     Diabetes Other     Hypertension Other     High Cholesterol Other     Migraines Other     Heart Attack Father 72         of MI    High Blood Pressure Mother     Diabetes Mother         Outpatient Medications Prior to Visit   Medication Sig Dispense Refill    montelukast (SINGULAIR) 10 MG tablet Take 1 tablet by mouth nightly 30 tablet 5    TRESIBA FLEXTOUCH 200 UNIT/ML SOPN INJECT 200 UNITS SUBCUTANEOUSLY ONE TIME DAILY 54 mL 3    omeprazole (PRILOSEC) 20 MG delayed release capsule TAKE 1 CAPSULE BY MOUTH TWO TIMES A DAY 60 capsule 0    lubiprostone (AMITIZA) 24 MCG capsule TAKE 1 CAPSULE BY MOUTH TWO TIMES A DAY WITH MEALS 60 capsule 5    Continuous Blood Gluc Sensor (FREESTYLE EMERALD 2 SENSOR) MISC Change every 14 days 2 each 5    insulin aspart (NOVOLOG FLEXPEN) 100 UNIT/ML injection pen inject 30 to 50 units into the skin three time daily before meals 60 mL 3    Insulin Pen Needle (B-D UF III MINI PEN NEEDLES) 31G X 5 MM MISC use five times daily with insulin 200 each 5    levothyroxine (SYNTHROID) 150 MCG tablet TAKE 1 TABLET BY MOUTH IN THE MORNING before breakfast 90 tablet 1    vitamin D3 (CHOLECALCIFEROL) 25 MCG (1000 UT) TABS tablet TAKE 3 TABLETS BY MOUTH ONE TIME A DAY 90 tablet 5    Febuxostat 80 MG TABS TAKE 1 TABLET BY MOUTH EVERY DAY 90 tablet 0    STEGLATRO 5 MG TABS TAKE 1 TABLET BY MOUTH EVERY DAY 90 tablet 1    FEROSUL 325 (65 Fe) MG tablet TAKE 1 TABLET BY MOUTH TWO TIMES A DAY WITH MEALS 180 tablet 0    Blood Glucose Monitoring Suppl (ONETOUCH VERIO) w/Device KIT Use to check glucose 4 times daily.  1 kit 0    Lidocaine 5 % CREA Apply to feet QID prn pain for peripheral neuropathy 30 g 5    butalbital-acetaminophen-caffeine (FIORICET, ESGIC) -40 MG per tablet Take 1 tablet by mouth every 6 hours as needed for Headaches 30 tablet 1    rosuvastatin (CRESTOR) 10 MG tablet Take 1 tablet by mouth nightly 90 tablet 3    leflunomide (ARAVA) 20 MG tablet TAKE 1 TABLET BY MOUTH EVERY DAY 90 tablet 0    pregabalin (LYRICA) 75 MG capsule Take 1 cap twice a day 60 capsule 2    sulfaSALAzine (AZULFIDINE) 500 MG tablet Take 1 tablet by mouth two times daily 180 tablet 0    colchicine (COLCRYS) 0.6 MG tablet Take 1 tablet by mouth every 48 hours 45 tablet 1    ranolazine (RANEXA) 500 MG extended release tablet TAKE 1 TABLET BY MOUTH TWO TIMES A  tablet 3    SM SENNA-S 8.6-50 MG per tablet TAKE 2 TABLETS BY MOUTH TWO TIMES A  tablet 0    torsemide (DEMADEX) 100 MG tablet TAKE 1 TABLET BY MOUTH IN THE MORNING AND 1/2 TABLET IN THE EVENING. 135 tablet 0    spironolactone (ALDACTONE) 50 MG tablet TAKE 2 TABLETS BY MOUTH IN THE MORNING AND THEN TAKE 1 TABLET BY MOUTH IN THE EVENING 270 tablet 3    Urea 40 % LOTN Apply to feet nightly and cover with Aquaphor 325 mL 5    mineral oil-hydrophilic petrolatum (HYDROPHOR) ointment Apply topically as needed to hands and feet. 454 g 5    Compression Stockings MISC by Does not apply route Zippered stockings for daily use on lower extremities (do not wear at night). 20-30mmHg. 1 each 1    ONETOUCH VERIO strip use to test blood sugar 5 times daily 200 strip 5    metOLazone (ZAROXOLYN) 2.5 MG tablet TAKE 1 TABLET BY MOUTH TWO TIMES A WEEK AS NEEDED FOR WEIGHT OVER 180 POUNDS.  DO NOT TAKE 2 DAYS IN A ROW. (Patient taking differently: Take 2.5 mg by mouth twice a week) 24 tablet 0    Continuous Blood Gluc  (FREESTYLE EMERALD 2 READER) MINDY To check glucose levels 1 each 0    metoprolol tartrate (LOPRESSOR) 25 MG tablet TAKE 1 TABLET BY MOUTH TWO TIMES A  tablet 0    albuterol (PROVENTIL) (2.5 MG/3ML) 0.083% nebulizer solution Take 3 mLs by nebulization every 6 hours as needed for Wheezing or Shortness of Breath 120 each 1    polyethylene glycol (GLYCOLAX) 17 GM/SCOOP powder TAKE 17 GRAMS (1 CAPFUL) BY MOUTH NIGHTLY 510 g 0    LINZESS 290 MCG CAPS capsule TAKE 1 CAPSULE BY MOUTH ONE TIME A DAY FOR 90 DAYS      REXULTI 2 MG TABS tablet Take 2 mg by mouth at bedtime       methocarbamol (ROBAXIN-750) 750 MG tablet Take 1 tablet by mouth 3 times daily as needed (pain and spasm) 30 tablet 0    nitroGLYCERIN (NITROSTAT) 0.4 MG SL tablet PLACE ONE TABLET UNDER TONGUE AS NEEDED FOR CHEST PAIN, MAY REPEAT EVERY 5 MINUTES AS NEEDED UP TO A MAX OF 3 TABLETS. IF NO RELIEF AFTER 1 DOSE, CALL 911. 25 tablet 0    nystatin (MYCOSTATIN) 573648 UNIT/ML suspension Take 5 mLs by mouth 4 times daily 500 mL 1    Magic Mouthwash (MIRACLE MOUTHWASH) Swish and spit 5 mLs 4 times daily as needed for Irritation or Pain 300 mL 2    traZODone (DESYREL) 50 MG tablet Take 1 tablet by mouth nightly Take it on the day of sleep study 1 tablet 0    loratadine (CLARITIN) 10 MG tablet TAKE 1 TABLET BY MOUTH ONE TIME A DAY  30 tablet 5    ketoconazole (NIZORAL) 2 % shampoo Apply topically daily as needed. 120 mL 1    diazePAM (VALIUM) 5 MG tablet Take 5 mg by mouth 2 times daily as needed for Anxiety. OXYGEN Inhale 2 L into the lungs nightly Sometimes with activity      oxyCODONE (OXYCONTIN) 20 MG extended release tablet Take 20 mg by mouth every 12 hours. aspirin 81 MG EC tablet Take 81 mg by mouth daily      benztropine (COGENTIN) 1 MG tablet Take 1 mg by mouth nightly       folic acid (FOLVITE) 1 MG tablet Take 1 mg by mouth daily      oxyCODONE-acetaminophen (PERCOCET)  MG per tablet Take 1 tablet by mouth every 4 hours as needed for Pain . Earliest Fill Date: 6/8/17 100 tablet 0    duloxetine (CYMBALTA) 60 MG capsule Take 60 mg by mouth 2 times daily. No facility-administered medications prior to visit. Patient'spast medical history, surgical history, family history, medications,  and allergies  were all reviewed and updated as appropriate today. Review of Systems    BP (!) 142/86 (Position: Standing)   Pulse 82   Wt 201 lb (91.2 kg)   SpO2 93%   BMI 34.50 kg/m²   Physical Exam  Vitals and nursing note reviewed. Constitutional:       Appearance: She is well-developed. She is not toxic-appearing. HENT:      Head: Normocephalic.       Right Ear: Tympanic membrane, ear canal and external ear normal.      Left Ear: Tympanic membrane, ear canal and external ear normal.      Mouth/Throat:      Pharynx: No oropharyngeal exudate or posterior oropharyngeal erythema. Eyes:      General: No scleral icterus. Extraocular Movements: Extraocular movements intact. Conjunctiva/sclera: Conjunctivae normal.      Pupils: Pupils are equal, round, and reactive to light. Neck:      Thyroid: No thyroid mass or thyromegaly. Vascular: No carotid bruit. Cardiovascular:      Rate and Rhythm: Normal rate and regular rhythm. Heart sounds: Normal heart sounds. No murmur heard. Pulmonary:      Effort: Pulmonary effort is normal.      Breath sounds: Decreased air movement present. Examination of the right-upper field reveals decreased breath sounds. Examination of the left-upper field reveals decreased breath sounds. Decreased breath sounds and wheezing (faint) present. Musculoskeletal:      Right lower le+ Edema present. Left lower le+ Edema present. Lymphadenopathy:      Cervical: No cervical adenopathy. Neurological:      General: No focal deficit present. Mental Status: She is alert and oriented to person, place, and time. Psychiatric:         Mood and Affect: Mood normal.         Behavior: Behavior normal. Behavior is cooperative. ASSESSMENT/PLAN:    Problem List Items Addressed This Visit       Chronic heart failure with preserved ejection fraction (HCC) (Chronic)     Recently had 15 pound weight gain. Did receive Lasix 120 mg IV x1 yesterday at the infusion center. Essential hypertension (Chronic)      Blood pressure has not been low so is not likely contributing to her dizziness. Orthostatics in the office were negative. Continue Aldactone 100 mg every morning/50 mg every afternoon, Demadex 100 mg every morning/50 mg every afternoon, and Lopressor 25 mg twice daily. Canker sore      Add triamcinolone oral paste. Likely secondary to biting her tongue. Recommend discussing with her dentist.         Complex care coordination      Notes from other providers reviewed.          Lymphedema Did see Dr. Cristo Cool earlier this month. Note reviewed. Lymphedema pump and custom compression stockings were ordered. OAB (overactive bladder)     Add Vesicare 10 mg daily. Severe episode of recurrent major depressive disorder [facility])     Patient follows with psychiatry. She is not actively suicidal but does not feel she has a good quality of life. She states that she would never hurt herself because of her Catholic and her ioana. Follows with psychiatry every 2 months. Tension type headache     Has appointment scheduled [facility] in September. Vertigo - Primary     ENT did not feel her symptoms were related to BPV. Possibly related to diabetic neuropathy. Orthostatics negative in the office. Trial of Antivert 25 mg 3 times daily as needed. We will also refer to physical therapy for vestibular therapy. Relevant Medications    meclizine (ANTIVERT) 25 MG tablet    Other Relevant Orders    [facility]       Current Outpatient Medications   Medication Sig Dispense Refill    torsemide (DEMADEX) 100 MG tablet TAKE 1 TABLET BY MOUTH IN THE MORNING AND 1/2 TABLET IN THE EVENING.  135 tablet 0    spironolactone (ALDACTONE) 50 MG tablet TAKE 2 TABLETS BY MOUTH IN THE MORNING and 1 tablet in the evening 270 tablet 0    solifenacin (VESICARE) 10 MG tablet Take 1 tablet by mouth daily 90 tablet 3    meclizine (ANTIVERT) 25 MG tablet Take 1 tablet by mouth 3 times daily as needed for Dizziness 30 tablet 2    montelukast (SINGULAIR) 10 MG tablet Take 1 tablet by mouth nightly 30 tablet 5    TRESIBA FLEXTOUCH 200 UNIT/ML SOPN INJECT 200 UNITS SUBCUTANEOUSLY ONE TIME DAILY 54 mL 3    lubiprostone (AMITIZA) 24 MCG capsule TAKE 1 CAPSULE BY MOUTH TWO TIMES A DAY WITH MEALS 60 capsule 5    Continuous Blood Gluc Sensor (FREESTYLE EMERALD 2 SENSOR) MISC Change every 14 days 2 each 5    insulin aspart (NOVOLOG FLEXPEN) 100 UNIT/ML injection pen inject 30 to 50 units into the skin three time daily before meals 60 mL 3    Insulin Pen Needle (B-D UF III MINI PEN NEEDLES) 31G X 5 MM MISC use five times daily with insulin 200 each 5    levothyroxine (SYNTHROID) 150 MCG tablet TAKE 1 TABLET BY MOUTH IN THE MORNING before breakfast 90 tablet 1    vitamin D3 (CHOLECALCIFEROL) 25 MCG (1000 UT) TABS tablet TAKE 3 TABLETS BY MOUTH ONE TIME A DAY 90 tablet 5    Febuxostat 80 MG TABS TAKE 1 TABLET BY MOUTH EVERY DAY 90 tablet 0    STEGLATRO 5 MG TABS TAKE 1 TABLET BY MOUTH EVERY DAY 90 tablet 1    FEROSUL 325 (65 Fe) MG tablet TAKE 1 TABLET BY MOUTH TWO TIMES A DAY WITH MEALS 180 tablet 0    Blood Glucose Monitoring Suppl (ONETOUCH VERIO) w/Device KIT Use to check glucose 4 times daily. 1 kit 0    Lidocaine 5 % CREA Apply to feet QID prn pain for peripheral neuropathy 30 g 5    butalbital-acetaminophen-caffeine (FIORICET, ESGIC) -40 MG per tablet Take 1 tablet by mouth every 6 hours as needed for Headaches 30 tablet 1    rosuvastatin (CRESTOR) 10 MG tablet Take 1 tablet by mouth nightly 90 tablet 3    leflunomide (ARAVA) 20 MG tablet TAKE 1 TABLET BY MOUTH EVERY DAY 90 tablet 0    pregabalin (LYRICA) 75 MG capsule Take 1 cap twice a day 60 capsule 2    sulfaSALAzine (AZULFIDINE) 500 MG tablet Take 1 tablet by mouth two times daily 180 tablet 0    colchicine (COLCRYS) 0.6 MG tablet Take 1 tablet by mouth every 48 hours 45 tablet 1    ranolazine (RANEXA) 500 MG extended release tablet TAKE 1 TABLET BY MOUTH TWO TIMES A  tablet 3    SM SENNA-S 8.6-50 MG per tablet TAKE 2 TABLETS BY MOUTH TWO TIMES A  tablet 0    Urea 40 % LOTN Apply to feet nightly and cover with Aquaphor 325 mL 5    mineral oil-hydrophilic petrolatum (HYDROPHOR) ointment Apply topically as needed to hands and feet. 454 g 5    Compression Stockings MISC by Does not apply route Zippered stockings for daily use on lower extremities (do not wear at night). 20-30mmHg.  1 each 1    ONETOUCH VERIO strip use to test blood sugar 5 times daily 200 strip 5    metOLazone (ZAROXOLYN) 2.5 MG tablet TAKE 1 TABLET BY MOUTH TWO TIMES A WEEK AS NEEDED FOR WEIGHT OVER 180 POUNDS. DO NOT TAKE 2 DAYS IN A ROW. (Patient taking differently: Take 2.5 mg by mouth twice a week) 24 tablet 0    Continuous Blood Gluc  (FREESTYLE EMERALD 2 READER) MINDY To check glucose levels 1 each 0    metoprolol tartrate (LOPRESSOR) 25 MG tablet TAKE 1 TABLET BY MOUTH TWO TIMES A  tablet 0    albuterol (PROVENTIL) (2.5 MG/3ML) 0.083% nebulizer solution Take 3 mLs by nebulization every 6 hours as needed for Wheezing or Shortness of Breath 120 each 1    polyethylene glycol (GLYCOLAX) 17 GM/SCOOP powder TAKE 17 GRAMS (1 CAPFUL) BY MOUTH NIGHTLY 510 g 0    LINZESS 290 MCG CAPS capsule TAKE 1 CAPSULE BY MOUTH ONE TIME A DAY FOR 90 DAYS      REXULTI 2 MG TABS tablet Take 2 mg by mouth at bedtime       methocarbamol (ROBAXIN-750) 750 MG tablet Take 1 tablet by mouth 3 times daily as needed (pain and spasm) 30 tablet 0    nitroGLYCERIN (NITROSTAT) 0.4 MG SL tablet PLACE ONE TABLET UNDER TONGUE AS NEEDED FOR CHEST PAIN, MAY REPEAT EVERY 5 MINUTES AS NEEDED UP TO A MAX OF 3 TABLETS. IF NO RELIEF AFTER 1 DOSE, CALL 911. 25 tablet 0    nystatin (MYCOSTATIN) 829843 UNIT/ML suspension Take 5 mLs by mouth 4 times daily 500 mL 1    Magic Mouthwash (MIRACLE MOUTHWASH) Swish and spit 5 mLs 4 times daily as needed for Irritation or Pain 300 mL 2    traZODone (DESYREL) 50 MG tablet Take 1 tablet by mouth nightly Take it on the day of sleep study 1 tablet 0    loratadine (CLARITIN) 10 MG tablet TAKE 1 TABLET BY MOUTH ONE TIME A DAY  30 tablet 5    ketoconazole (NIZORAL) 2 % shampoo Apply topically daily as needed. 120 mL 1    diazePAM (VALIUM) 5 MG tablet Take 5 mg by mouth 2 times daily as needed for Anxiety.        OXYGEN Inhale 2 L into the lungs nightly Sometimes with activity      oxyCODONE (OXYCONTIN) 20 MG extended release tablet Take 20 mg by mouth every 12 hours. aspirin 81 MG EC tablet Take 81 mg by mouth daily      benztropine (COGENTIN) 1 MG tablet Take 1 mg by mouth nightly       folic acid (FOLVITE) 1 MG tablet Take 1 mg by mouth daily      oxyCODONE-acetaminophen (PERCOCET)  MG per tablet Take 1 tablet by mouth every 4 hours as needed for Pain . Earliest Fill Date: 6/8/17 100 tablet 0    duloxetine (CYMBALTA) 60 MG capsule Take 60 mg by mouth 2 times daily. omeprazole (PRILOSEC) 20 MG delayed release capsule TAKE 1 CAPSULE BY MOUTH TWO TIMES A DAY 60 capsule 0     No current facility-administered medications for this visit. Return in about 2 months (around 10/23/2022). Reviewed notes from cardiology, ENT, pulmonology, vascular.

## 2022-08-23 NOTE — PATIENT INSTRUCTIONS
Try taking meclizine 25 mg 1 pill 3 times a day for 7 days for the dizziness. After 7 days, you may take it as needed. I think that your dizziness is coming from your inner ear. Physical therapy may also be helpful in treating the dizziness.

## 2022-08-25 DIAGNOSIS — K21.9 HIATAL HERNIA WITH GERD: ICD-10-CM

## 2022-08-25 DIAGNOSIS — I10 ESSENTIAL HYPERTENSION: ICD-10-CM

## 2022-08-25 DIAGNOSIS — I50.30 (HFPEF) HEART FAILURE WITH PRESERVED EJECTION FRACTION (HCC): Chronic | ICD-10-CM

## 2022-08-25 DIAGNOSIS — N28.9 RENAL INSUFFICIENCY: Chronic | ICD-10-CM

## 2022-08-25 DIAGNOSIS — I25.10 CORONARY ARTERY DISEASE INVOLVING NATIVE CORONARY ARTERY OF NATIVE HEART WITHOUT ANGINA PECTORIS: Chronic | ICD-10-CM

## 2022-08-25 DIAGNOSIS — R06.02 SOB (SHORTNESS OF BREATH): Chronic | ICD-10-CM

## 2022-08-25 DIAGNOSIS — K44.9 HIATAL HERNIA WITH GERD: ICD-10-CM

## 2022-08-25 DIAGNOSIS — I50.22 SYSTOLIC CHF, CHRONIC (HCC): ICD-10-CM

## 2022-08-26 ENCOUNTER — TELEPHONE (OUTPATIENT)
Dept: ENDOCRINOLOGY | Age: 52
End: 2022-08-26

## 2022-08-26 RX ORDER — SPIRONOLACTONE 50 MG/1
TABLET, FILM COATED ORAL
Qty: 270 TABLET | Refills: 0 | Status: SHIPPED | OUTPATIENT
Start: 2022-08-26

## 2022-08-26 RX ORDER — TORSEMIDE 100 MG/1
TABLET ORAL
Qty: 135 TABLET | Refills: 0 | Status: SHIPPED | OUTPATIENT
Start: 2022-08-26

## 2022-08-26 RX ORDER — OMEPRAZOLE 20 MG/1
CAPSULE, DELAYED RELEASE ORAL
Qty: 60 CAPSULE | Refills: 0 | OUTPATIENT
Start: 2022-08-26

## 2022-08-29 ENCOUNTER — TELEPHONE (OUTPATIENT)
Dept: CARDIOLOGY CLINIC | Age: 52
End: 2022-08-29

## 2022-08-29 NOTE — TELEPHONE ENCOUNTER
8/22/22  Labs drawn followed by lasix 120mg at 20 mg per minute. Tolerated well. Called pt to see if she lost weight after her infusion on 8/22/22? No answer. Left message.

## 2022-08-29 NOTE — TELEPHONE ENCOUNTER
Pt is interested to be scheduled at the infusion center. Pt weight has jump up to 200. Please call to discuss.   Please advise

## 2022-08-30 ENCOUNTER — CLINICAL DOCUMENTATION (OUTPATIENT)
Dept: ONCOLOGY | Age: 52
End: 2022-08-30

## 2022-08-30 DIAGNOSIS — K21.9 HIATAL HERNIA WITH GERD: ICD-10-CM

## 2022-08-30 DIAGNOSIS — K44.9 HIATAL HERNIA WITH GERD: ICD-10-CM

## 2022-08-30 NOTE — TELEPHONE ENCOUNTER
Spoke to patient's spouse he stated the patient's weight is 200. He is asking if patient should go back to the infusion center for IV Lasix. He stated the metolazone is not getting the water off any longer she is taking 5 mg twice a week Sunday and Wednesday. Currently taking torsemide 100 mg in the am and 50 mg in the evening and her weight is at 200. Swelling in legs and feet and sob.

## 2022-08-30 NOTE — TELEPHONE ENCOUNTER
Spoken to patient's spouse he verbalized understanding. Sent orders to infusion center. Confirmation received.

## 2022-08-31 ENCOUNTER — CLINICAL DOCUMENTATION (OUTPATIENT)
Dept: ONCOLOGY | Age: 52
End: 2022-08-31

## 2022-08-31 RX ORDER — OMEPRAZOLE 20 MG/1
CAPSULE, DELAYED RELEASE ORAL
Qty: 60 CAPSULE | Refills: 0 | Status: SHIPPED | OUTPATIENT
Start: 2022-08-31 | End: 2022-09-12

## 2022-09-01 ENCOUNTER — HOSPITAL ENCOUNTER (OUTPATIENT)
Dept: ONCOLOGY | Age: 52
Setting detail: INFUSION SERIES
Discharge: HOME | End: 2022-09-01
Payer: COMMERCIAL

## 2022-09-01 VITALS
BODY MASS INDEX: 34.33 KG/M2 | HEART RATE: 72 BPM | TEMPERATURE: 97.8 F | WEIGHT: 200 LBS | OXYGEN SATURATION: 93 % | RESPIRATION RATE: 16 BRPM | DIASTOLIC BLOOD PRESSURE: 63 MMHG | SYSTOLIC BLOOD PRESSURE: 116 MMHG

## 2022-09-01 PROCEDURE — 2580000003 HC RX 258: Performed by: NURSE PRACTITIONER

## 2022-09-01 PROCEDURE — 99211 OFF/OP EST MAY X REQ PHY/QHP: CPT

## 2022-09-01 PROCEDURE — 96365 THER/PROPH/DIAG IV INF INIT: CPT

## 2022-09-01 PROCEDURE — 96366 THER/PROPH/DIAG IV INF ADDON: CPT

## 2022-09-01 PROCEDURE — 96367 TX/PROPH/DG ADDL SEQ IV INF: CPT

## 2022-09-01 PROCEDURE — 6360000002 HC RX W HCPCS: Performed by: NURSE PRACTITIONER

## 2022-09-01 PROCEDURE — 96375 TX/PRO/DX INJ NEW DRUG ADDON: CPT

## 2022-09-01 RX ORDER — FUROSEMIDE 10 MG/ML
120 INJECTION INTRAMUSCULAR; INTRAVENOUS ONCE
Status: COMPLETED | OUTPATIENT
Start: 2022-09-01 | End: 2022-09-01

## 2022-09-01 RX ORDER — SODIUM CHLORIDE 0.9 % (FLUSH) 0.9 %
5-40 SYRINGE (ML) INJECTION ONCE
Status: COMPLETED | OUTPATIENT
Start: 2022-09-01 | End: 2022-09-01

## 2022-09-01 RX ADMIN — FUROSEMIDE 120 MG: 10 INJECTION, SOLUTION INTRAMUSCULAR; INTRAVENOUS at 10:15

## 2022-09-01 RX ADMIN — FUROSEMIDE 20 MG/HR: 10 INJECTION, SOLUTION INTRAMUSCULAR; INTRAVENOUS at 10:27

## 2022-09-01 RX ADMIN — Medication 10 ML: at 10:12

## 2022-09-01 NOTE — PROGRESS NOTES
Pt to Dept for Lasix infusion. Lasix 120mg IVP followed by lasix gtt 20mg/hr for 4 hours. AVS printed and reviewed. Pt eliana treatment with no adverse reaction noted.  Pt to follow up with MD.

## 2022-09-03 PROBLEM — N32.81 OAB (OVERACTIVE BLADDER): Status: ACTIVE | Noted: 2022-09-03

## 2022-09-03 PROBLEM — N39.41 URGE INCONTINENCE OF URINE: Status: RESOLVED | Noted: 2020-12-26 | Resolved: 2022-09-03

## 2022-09-03 PROBLEM — K11.1 ENLARGEMENT OF SUBMANDIBULAR GLAND: Status: RESOLVED | Noted: 2019-04-10 | Resolved: 2022-09-03

## 2022-09-03 PROBLEM — J18.9 PNEUMONIA: Status: RESOLVED | Noted: 2020-03-25 | Resolved: 2022-09-03

## 2022-09-03 PROBLEM — R32 URINARY INCONTINENCE IN FEMALE: Status: RESOLVED | Noted: 2022-05-01 | Resolved: 2022-09-03

## 2022-09-03 PROBLEM — M79.641 RIGHT HAND PAIN: Status: RESOLVED | Noted: 2018-07-03 | Resolved: 2022-09-03

## 2022-09-03 PROBLEM — D49.2 SKIN NEOPLASM: Status: RESOLVED | Noted: 2021-09-05 | Resolved: 2022-09-03

## 2022-09-03 PROBLEM — M79.671 RIGHT FOOT PAIN: Status: RESOLVED | Noted: 2021-09-05 | Resolved: 2022-09-03

## 2022-09-03 NOTE — ASSESSMENT & PLAN NOTE
Add triamcinolone oral paste. Likely secondary to biting her tongue.   Recommend discussing with her dentist.

## 2022-09-03 NOTE — ASSESSMENT & PLAN NOTE
ENT did not feel her symptoms were related to BPV. Possibly related to diabetic neuropathy. Orthostatics negative in the office. Trial of Antivert 25 mg 3 times daily as needed. We will also refer to physical therapy for vestibular therapy.

## 2022-09-03 NOTE — ASSESSMENT & PLAN NOTE
Blood pressure has not been low so is not likely contributing to her dizziness. Orthostatics in the office were negative. Continue Aldactone 100 mg every morning/50 mg every afternoon, Demadex 100 mg every morning/50 mg every afternoon, and Lopressor 25 mg twice daily.

## 2022-09-03 NOTE — ASSESSMENT & PLAN NOTE
Did see Dr. Ramón Wiley earlier this month. Note reviewed. Lymphedema pump and custom compression stockings were ordered.

## 2022-09-03 NOTE — ASSESSMENT & PLAN NOTE
Patient follows with psychiatry. She is not actively suicidal but does not feel she has a good quality of life. She states that she would never hurt herself because of her [employer] and her ioana. Follows with psychiatry every 2 months.

## 2022-09-06 RX ORDER — SENNOSIDES AND DOCUSATE SODIUM 8.6; 5 MG/1; MG/1
TABLET, FILM COATED ORAL
Qty: 360 TABLET | Refills: 5 | Status: SHIPPED | OUTPATIENT
Start: 2022-09-06

## 2022-09-06 NOTE — TELEPHONE ENCOUNTER
----- Message from Joann Aquino MD sent at 8/3/2021  4:33 PM EDT -----  Please call patient. Her labs look okay. Likely holding more water. She can take an extra dose of metolazone this week. No other changes.   ARETHA
No

## 2022-09-11 DIAGNOSIS — I42.9 CARDIOMYOPATHY, UNSPECIFIED TYPE (HCC): Chronic | ICD-10-CM

## 2022-09-11 DIAGNOSIS — I25.10 CORONARY ARTERY DISEASE DUE TO LIPID RICH PLAQUE: Chronic | ICD-10-CM

## 2022-09-11 DIAGNOSIS — N28.9 RENAL INSUFFICIENCY: Chronic | ICD-10-CM

## 2022-09-11 DIAGNOSIS — I50.30 (HFPEF) HEART FAILURE WITH PRESERVED EJECTION FRACTION (HCC): Chronic | ICD-10-CM

## 2022-09-11 DIAGNOSIS — K21.9 HIATAL HERNIA WITH GERD: ICD-10-CM

## 2022-09-11 DIAGNOSIS — I25.83 CORONARY ARTERY DISEASE DUE TO LIPID RICH PLAQUE: Chronic | ICD-10-CM

## 2022-09-11 DIAGNOSIS — I25.10 CORONARY ARTERY DISEASE INVOLVING NATIVE CORONARY ARTERY OF NATIVE HEART WITHOUT ANGINA PECTORIS: Chronic | ICD-10-CM

## 2022-09-11 DIAGNOSIS — K44.9 HIATAL HERNIA WITH GERD: ICD-10-CM

## 2022-09-12 RX ORDER — OMEPRAZOLE 20 MG/1
CAPSULE, DELAYED RELEASE ORAL
Qty: 60 CAPSULE | Refills: 0 | Status: SHIPPED | OUTPATIENT
Start: 2022-09-12 | End: 2022-10-26

## 2022-09-12 RX ORDER — SIMVASTATIN 20 MG
TABLET ORAL
Qty: 90 TABLET | Refills: 0 | Status: SHIPPED | OUTPATIENT
Start: 2022-09-12 | End: 2022-10-03 | Stop reason: ALTCHOICE

## 2022-09-12 NOTE — TELEPHONE ENCOUNTER
Prescription refill    Last OV:04/20/2022    Last Refill:02/17/2022    Labs:08/22/2022    Future Appt: 10/03/2022

## 2022-09-13 DIAGNOSIS — M51.36 DDD (DEGENERATIVE DISC DISEASE), LUMBAR: ICD-10-CM

## 2022-09-14 ENCOUNTER — OFFICE VISIT (OUTPATIENT)
Dept: RHEUMATOLOGY | Age: 52
End: 2022-09-14
Payer: COMMERCIAL

## 2022-09-14 VITALS — SYSTOLIC BLOOD PRESSURE: 120 MMHG | BODY MASS INDEX: 34.47 KG/M2 | WEIGHT: 200.8 LBS | DIASTOLIC BLOOD PRESSURE: 80 MMHG

## 2022-09-14 DIAGNOSIS — M51.36 DDD (DEGENERATIVE DISC DISEASE), LUMBAR: ICD-10-CM

## 2022-09-14 DIAGNOSIS — M15.9 PRIMARY OSTEOARTHRITIS INVOLVING MULTIPLE JOINTS: ICD-10-CM

## 2022-09-14 DIAGNOSIS — M1A.09X0 IDIOPATHIC CHRONIC GOUT OF MULTIPLE SITES WITHOUT TOPHUS: ICD-10-CM

## 2022-09-14 DIAGNOSIS — Z79.899 HIGH RISK MEDICATION USE: ICD-10-CM

## 2022-09-14 DIAGNOSIS — R76.8 POSITIVE ANA (ANTINUCLEAR ANTIBODY): ICD-10-CM

## 2022-09-14 DIAGNOSIS — M06.00 SERONEGATIVE RHEUMATOID ARTHRITIS (HCC): Primary | ICD-10-CM

## 2022-09-14 PROCEDURE — 1036F TOBACCO NON-USER: CPT | Performed by: INTERNAL MEDICINE

## 2022-09-14 PROCEDURE — G8417 CALC BMI ABV UP PARAM F/U: HCPCS | Performed by: INTERNAL MEDICINE

## 2022-09-14 PROCEDURE — 99214 OFFICE O/P EST MOD 30 MIN: CPT | Performed by: INTERNAL MEDICINE

## 2022-09-14 PROCEDURE — 3017F COLORECTAL CA SCREEN DOC REV: CPT | Performed by: INTERNAL MEDICINE

## 2022-09-14 PROCEDURE — G8427 DOCREV CUR MEDS BY ELIG CLIN: HCPCS | Performed by: INTERNAL MEDICINE

## 2022-09-14 RX ORDER — FEBUXOSTAT 80 MG/1
TABLET, FILM COATED ORAL
Qty: 90 TABLET | Refills: 1 | Status: SHIPPED | OUTPATIENT
Start: 2022-09-14

## 2022-09-14 RX ORDER — LEFLUNOMIDE 20 MG/1
TABLET ORAL
Qty: 90 TABLET | Refills: 0 | Status: SHIPPED | OUTPATIENT
Start: 2022-09-14

## 2022-09-14 RX ORDER — SULFASALAZINE 500 MG/1
TABLET ORAL
Qty: 180 TABLET | Refills: 0 | Status: SHIPPED | OUTPATIENT
Start: 2022-09-14

## 2022-09-14 RX ORDER — PREGABALIN 75 MG/1
CAPSULE ORAL
Qty: 60 CAPSULE | Refills: 2 | Status: SHIPPED | OUTPATIENT
Start: 2022-09-14 | End: 2022-10-14

## 2022-09-14 RX ORDER — COLCHICINE 0.6 MG/1
0.6 TABLET ORAL
Qty: 45 TABLET | Refills: 1 | Status: CANCELLED | OUTPATIENT
Start: 2022-09-14

## 2022-09-14 RX ORDER — PREGABALIN 75 MG/1
CAPSULE ORAL
Qty: 60 CAPSULE | Refills: 2 | Status: SHIPPED | OUTPATIENT
Start: 2022-09-14 | End: 2022-09-14 | Stop reason: SDUPTHER

## 2022-09-14 RX ORDER — SENNOSIDES AND DOCUSATE SODIUM 8.6; 5 MG/1; MG/1
TABLET, FILM COATED ORAL
Qty: 360 TABLET | Refills: 0 | OUTPATIENT
Start: 2022-09-14

## 2022-09-14 NOTE — PROGRESS NOTES
2022  Patient Name: Carlos Longoria  : 1970  Medical Record: [id number]      ASSESSMENT AND PLAN    Assessment/Plan:      ASSESSMENT:    1. Seronegative rheumatoid arthritis ([facility] [address]    2. Idiopathic chronic gout of multiple sites without tophus    3. DDD (degenerative disc disease), lumbar    4. High risk medication use    5. Positive BRENNA (antinuclear antibody)    6. Primary osteoarthritis involving multiple joints        PLAN:     Crow was seen today for follow-up. Diagnoses and all orders for this visit:    Seronegative rheumatoid arthritis ([facility] [address]  -     leflunomide (ARAVA) 20 MG tablet; TAKE 1 TABLET BY MOUTH EVERY DAY  -     sulfaSALAzine (AZULFIDINE) 500 MG tablet; Take 1 tablet by mouth two times daily    Idiopathic chronic gout of multiple sites without tophus  -     Febuxostat 80 MG TABS; TAKE 1 TABLET BY MOUTH EVERY DAY    DDD (degenerative disc disease), lumbar  -     pregabalin (LYRICA) 75 MG capsule; TAKE 1 CAPSULE BY MOUTH TWO TIMES A DAY    High risk medication use    Positive BRENNA (antinuclear antibody)    Primary osteoarthritis involving multiple joints  Seronegative rheumatoid arthritis-diagnosed more than 10 years ago. RF, CCP negative. HLA-B27, hepatitis panel negative. Hand x-rays with osteoarthritis/degenerative changes  Continues to be symptomatic  I did not appreciate any synovitis on joint exam  Hand swelling most likely multifactorial [fluid retention, diabetes, medication side effects]  She will continue sulfasalazine 500 mg twice a day   Continue leflunomide 20 mg daily  Previous history of methotrexate [hair loss] and Plaquenil [loss of efficacy] use. Gout-last uric acid 3.7. No recent flareups of gout.   Discontinue colchicine and continue Uloric 80 mg daily     Degenerative disc disease in the lumbar and cervical spine-   Lumba  and cervical spine with multilevel degenerative changes   advised to continue follow-up with a spine specialist/pain specialist.  Jocelyn Montemayor pain specialist.    Continue Lyrica 75 mg twice a day  Percocet and oxycodone prescribed by pain specialist    Low titer positive BRENNA-1: 80 speckled pattern  Implications of low titer positive BRENNA were discussed with patient. About 15-20% of normal healthy individuals at her age may have low titer positive BRENNA of unclear clinical significance. dsDNA, anti-Piedra, RNP, SSA/SSB is negative      The patient indicates understanding of these issues and agrees with the plan. Return in about 3 months (around 12/14/2022). The risks and benefits of my recommendations, as well as other treatment options, benefits and side effects werediscussed with the patient. All questions were answered. MEDICATIONS  Current Outpatient Medications   Medication Sig Dispense Refill    Febuxostat 80 MG TABS TAKE 1 TABLET BY MOUTH EVERY DAY 90 tablet 1    leflunomide (ARAVA) 20 MG tablet TAKE 1 TABLET BY MOUTH EVERY DAY 90 tablet 0    sulfaSALAzine (AZULFIDINE) 500 MG tablet Take 1 tablet by mouth two times daily 180 tablet 0    pregabalin (LYRICA) 75 MG capsule TAKE 1 CAPSULE BY MOUTH TWO TIMES A DAY 60 capsule 2    metoprolol tartrate (LOPRESSOR) 25 MG tablet TAKE 1 TABLET BY MOUTH TWO TIMES A  tablet 0    simvastatin (ZOCOR) 20 MG tablet TAKE 1 TABLET BY MOUTH ONCE DAILY AT NIGHT. 90 tablet 0    omeprazole (PRILOSEC) 20 MG delayed release capsule TAKE 1 CAPSULE BY MOUTH TWO TIMES A DAY 60 capsule 0    SM SENNA-S 8.6-50 MG per tablet TAKE 2 TABLETS BY MOUTH TWO TIMES A  tablet 5    torsemide (DEMADEX) 100 MG tablet TAKE 1 TABLET BY MOUTH IN THE MORNING AND 1/2 TABLET IN THE EVENING.  135 tablet 0    spironolactone (ALDACTONE) 50 MG tablet TAKE 2 TABLETS BY MOUTH IN THE MORNING and 1 tablet in the evening 270 tablet 0    solifenacin (VESICARE) 10 MG tablet Take 1 tablet by mouth daily 90 tablet 3    meclizine (ANTIVERT) 25 MG tablet Take 1 tablet by mouth 3 times daily as needed for Dizziness 30 tablet 2 montelukast (SINGULAIR) 10 MG tablet Take 1 tablet by mouth nightly 30 tablet 5    TRESIBA FLEXTOUCH 200 UNIT/ML SOPN INJECT 200 UNITS SUBCUTANEOUSLY ONE TIME DAILY 54 mL 3    lubiprostone (AMITIZA) 24 MCG capsule TAKE 1 CAPSULE BY MOUTH TWO TIMES A DAY WITH MEALS 60 capsule 5    Continuous Blood Gluc Sensor (FREESTYLE EMERALD 2 SENSOR) MISC Change every 14 days 2 each 5    insulin aspart (NOVOLOG FLEXPEN) 100 UNIT/ML injection pen inject 30 to 50 units into the skin three time daily before meals 60 mL 3    Insulin Pen Needle (B-D UF III MINI PEN NEEDLES) 31G X 5 MM MISC use five times daily with insulin 200 each 5    levothyroxine (SYNTHROID) 150 MCG tablet TAKE 1 TABLET BY MOUTH IN THE MORNING before breakfast 90 tablet 1    vitamin D3 (CHOLECALCIFEROL) 25 MCG (1000 UT) TABS tablet TAKE 3 TABLETS BY MOUTH ONE TIME A DAY 90 tablet 5    STEGLATRO 5 MG TABS TAKE 1 TABLET BY MOUTH EVERY DAY 90 tablet 1    FEROSUL 325 (65 Fe) MG tablet TAKE 1 TABLET BY MOUTH TWO TIMES A DAY WITH MEALS 180 tablet 0    Blood Glucose Monitoring Suppl (ONETOUCH VERIO) w/Device KIT Use to check glucose 4 times daily. 1 kit 0    Lidocaine 5 % CREA Apply to feet QID prn pain for peripheral neuropathy 30 g 5    butalbital-acetaminophen-caffeine (FIORICET, ESGIC) -40 MG per tablet Take 1 tablet by mouth every 6 hours as needed for Headaches 30 tablet 1    rosuvastatin (CRESTOR) 10 MG tablet Take 1 tablet by mouth nightly 90 tablet 3    colchicine (COLCRYS) 0.6 MG tablet Take 1 tablet by mouth every 48 hours 45 tablet 1    ranolazine (RANEXA) 500 MG extended release tablet TAKE 1 TABLET BY MOUTH TWO TIMES A  tablet 3    Urea 40 % LOTN Apply to feet nightly and cover with Aquaphor 325 mL 5    mineral oil-hydrophilic petrolatum (HYDROPHOR) ointment Apply topically as needed to hands and feet. 454 g 5    Compression Stockings MISC by Does not apply route Zippered stockings for daily use on lower extremities (do not wear at night). 20-30mmHg. 1 each 1    ONETOUCH VERIO strip use to test blood sugar 5 times daily 200 strip 5    metOLazone (ZAROXOLYN) 2.5 MG tablet TAKE 1 TABLET BY MOUTH TWO TIMES A WEEK AS NEEDED FOR WEIGHT OVER 180 POUNDS. DO NOT TAKE 2 DAYS IN A ROW. (Patient taking differently: Take 2.5 mg by mouth twice a week) 24 tablet 0    Continuous Blood Gluc  (FREESTYLE EMERALD 2 READER) MINDY To check glucose levels 1 each 0    albuterol (PROVENTIL) (2.5 MG/3ML) 0.083% nebulizer solution Take 3 mLs by nebulization every 6 hours as needed for Wheezing or Shortness of Breath 120 each 1    polyethylene glycol (GLYCOLAX) 17 GM/SCOOP powder TAKE 17 GRAMS (1 CAPFUL) BY MOUTH NIGHTLY 510 g 0    LINZESS 290 MCG CAPS capsule TAKE 1 CAPSULE BY MOUTH ONE TIME A DAY FOR 90 DAYS      REXULTI 2 MG TABS tablet Take 2 mg by mouth at bedtime       methocarbamol (ROBAXIN-750) 750 MG tablet Take 1 tablet by mouth 3 times daily as needed (pain and spasm) 30 tablet 0    nitroGLYCERIN (NITROSTAT) 0.4 MG SL tablet PLACE ONE TABLET UNDER TONGUE AS NEEDED FOR CHEST PAIN, MAY REPEAT EVERY 5 MINUTES AS NEEDED UP TO A MAX OF 3 TABLETS. IF NO RELIEF AFTER 1 DOSE, CALL 911. 25 tablet 0    nystatin (MYCOSTATIN) 116436 UNIT/ML suspension Take 5 mLs by mouth 4 times daily 500 mL 1    Magic Mouthwash (MIRACLE MOUTHWASH) Swish and spit 5 mLs 4 times daily as needed for Irritation or Pain 300 mL 2    traZODone (DESYREL) 50 MG tablet Take 1 tablet by mouth nightly Take it on the day of sleep study 1 tablet 0    loratadine (CLARITIN) 10 MG tablet TAKE 1 TABLET BY MOUTH ONE TIME A DAY  30 tablet 5    ketoconazole (NIZORAL) 2 % shampoo Apply topically daily as needed. 120 mL 1    diazePAM (VALIUM) 5 MG tablet Take 5 mg by mouth 2 times daily as needed for Anxiety. OXYGEN Inhale 2 L into the lungs nightly Sometimes with activity      oxyCODONE (OXYCONTIN) 20 MG extended release tablet Take 20 mg by mouth every 12 hours.       aspirin 81 MG EC tablet Take 81 mg by mouth daily      benztropine (COGENTIN) 1 MG tablet Take 1 mg by mouth nightly       oxyCODONE-acetaminophen (PERCOCET)  MG per tablet Take 1 tablet by mouth every 4 hours as needed for Pain . Earliest Fill Date: 6/8/17 100 tablet 0    duloxetine (CYMBALTA) 60 MG capsule Take 60 mg by mouth 2 times daily. No current facility-administered medications for this visit. ALLERGIES  Allergies   Allergen Reactions    Iv Dye [Iodides] Anaphylaxis     allergic to ct scan dye, not the MRI    Diazepam Other (See Comments)    Insulin Glargine Other (See Comments)     High blood sugar     Trazodone And Nefazodone Other (See Comments)     Makes patient feel very sluggish         Comments  No specialty comments available. Background history:  Gabby West is a 47 y.o. female with past medical history of hypertension, diabetes, congestive heart failure, COPD, depression, hypothyroidism, chronic kidney disease, degenerative disc disease in the lumbar spine, breast cancer status post chemoradiation, seronegative rheumatoid arthritis, gout who is being seen for follow up evaluation of  seronegative rheumatoid arthritis and gout. She was seeing Dr. Frieda Tuttle  at [facility].  She was last seen in January 2021. She was diagnosed with seronegative rheumatoid arthritis several years ago. She was on methotrexate and Plaquenil in the past which was stopped due to lack of efficacy. She is now on leflunomide 10 mg daily and sulfasalazine 500 mg twice a day. She was on sulfasalazine 1000 mg twice a day which was slowly tapered down to 500 mg twice a day since it was not helping much. She continues to have pain in the joints especially with weather changes. She has swelling in the hands. She has stiffness in the joints that can last all day long. She denies psoriasis, inflammatory bowel disease, inflammatory back pain, dactylitis, enthesitis, tenosynovitis or uveitis.   She denies fever, weight loss or swollen glands. She denies family history of rheumatoid arthritis. She has degenerative disc disease in the lumbar and cervical spine. She has low chronic low back pain. Back pain gets worse with activity and improves with rest.  Back pain radiates to the left lower extremity. She denies bowel or bladder dysfunction, saddle anesthesia. She sees a pain specialist and receives epidural spinal injections. She also has a gout which was diagnosed 2 years ago. She is on Uloric 80 mg daily and colchicine 0.6 mg daily. She is unable to go off of colchicine due to frequent flareups. She has lymphedema of the left arm after she underwent breast surgery with lymph node resection and chemoradiation. She has been on tamoxifen for several years. She is off of tamoxifen for past 2 years. She has not had any recurrence of breast cancer. Data reviewed: Reviewed notes by Dr. Teetee Gonzalez from January 2021 as mentioned in HPI. Chronic rheumatoid arthritis, gout, lymphedema, CKD. Taper of sulfasalazine and continue Arava 10 mg daily. Continue Uloric 80 mg daily. Interim history: She presents for follow-up of seronegative rheumatoid arthritis, osteoarthritis, gout, chronic pain. She is on leflunomide 20 mg daily and sulfasalazine 500 mg twice a day. She continues complain of pain, swelling and stiffness in the joints. Symptoms get worse with weather changes. She has swelling in the hands. Stiffness can last all day long. She also has chronic neck and low back pain. She sees a pain specialist.  She has degenerative disc disease in the lumbar and cervical spine. She has received multiple epidural spinal injections in the past.  She also has gout. He has not had any flareup of gout. Last uric acid was 3.7. She is on colchicine 0.6 mg every other day and Uloric 80 mg daily. She denies any side effects with leflunomide and sulfasalazine. She denies any recent fevers or infections.   HPI  Review of (chronic obstructive pulmonary disease) (HCC)     Depression     Diabetes mellitus ([facility] [address]     Diabetic polyneuropathy associated with type 2 diabetes mellitus ([facility] 2/2/2018    Dysthymia 2/2/2018    ESBL (extended spectrum beta-lactamase) producing bacteria infection 03/14/2018    urine    Fibromyalgia     Gastric ulcer, unspecified as acute or chronic, without mention of hemorrhage, perforation, or obstruction     GERD (gastroesophageal reflux disease)     Gout     HIGH CHOLESTEROL     Hypertension     Hypothyroidism     Pneumonia 3/25/2020    Pyelonephritis     Severe persistent asthma without complication 5/4/9371    Thyroid disease     TIA (transient ischemic attack)     with occasional left leg and hand weakness     Past Surgical History:   Procedure Laterality Date    BREAST SURGERY      left mastectomy    CARPAL TUNNEL RELEASE      Bilateral    FINGER CONTRACTURE SURGERY      HYSTERECTOMY (CERVIX STATUS UNKNOWN)  2005    USO    MASTECTOMY      TONSILLECTOMY      UPPER GASTROINTESTINAL ENDOSCOPY  2019    stretched esophagous      Social History     Socioeconomic History    Marital status:      Spouse name: Natalio Adkins    Number of children: 3    Years of education: Not on file    Highest education level: Not on file   Occupational History    Occupation: disabled   Tobacco Use    Smoking status: Never    Smokeless tobacco: Never   Vaping Use    Vaping Use: Never used   Substance and Sexual Activity    Alcohol use: No    Drug use: No    Sexual activity: Not Currently   Other Topics Concern    Not on file   Social History Narrative    Not on file     Social Determinants of Health     Financial Resource Strain: Low Risk     Difficulty of Paying Living Expenses: Not hard at all   Food Insecurity: No Food Insecurity    Worried About Running Out of Food in the Last Year: Never true    Ran Out of Food in the Last Year: Never true   Transportation Needs: Not on file   Physical Activity: Not on file   Stress: Not on file   Social Connections: Not on file   Intimate Partner Violence: Not on file   Housing Stability: Not on file     Family History   Problem Relation Age of Onset    Asthma Other     Cancer Other     Depression Other     Diabetes Other     Hypertension Other     High Cholesterol Other     Migraines Other     Heart Attack Father 72         of MI    High Blood Pressure Mother     Diabetes Mother          PHYSICAL EXAM   Vitals:    22 1452   BP: 120/80   Site: Right Upper Arm   Position: Sitting   Cuff Size: Medium Adult   Weight: 200 lb 12.8 oz (91.1 kg)     Physical Exam  Constitutional:  Well developed, well nourished, no acute distress, non-toxic appearance   Musculoskeletal:    Ambulates without assistance, normal gait  Neck: Decreased range of motion, tenderness to palpation +  RIGHT  Swell  Tender  ROM  LEFT  Swell  Tender  ROM    DIP2  0  0   [place]  0  0   [place]   DIP3  0  0   [place]  0  0   [place]   DIP4  0  0   [place]  0  0   [place]   DIP5  0  0   [place]  0  0   [place]   PIP1  0  0  FULL   0  0  FULL    PIP2  0 0 FULL   0  0  FULL    PIP3  0 0 FULL   0  0  FULL    PIP4  0 0 FULL   0  0  FULL    PIP5  0 0 FULL   0  0  FULL    MCP1  0 0 FULL   0  0  FULL    MCP2  0 0 FULL   0  0  FULL    MCP3  0 0 FULL   0  0  FULL    MCP4  0 0 FULL   0  0  FULL    MCP5  0 0 FULL   0  0  FULL    Wrist  0  0  FULL   0  0  FULL    Elbow  0  0  FULL   0  0  FULL    Shouldr  0  0  decr  0  0  decr   Hip  0  0  decr  0  0  decr   Knee  0  0   crepitus  0  0   crepitus   Ankle  0  0  FULL   0  0  FULL    MTP1  0  0  FULL   0  0  FULL    MTP2  0  0  FULL   0  0  FULL    MTP3  0  0  FULL   0  0  FULL    MTP4  0  0  FULL   0  0  FULL    MTP5  0  0  FULL   0  0  FULL    IP1  0  0  FULL   0  0  FULL    IP2  0  0  FULL   0  0  FULL    IP3  0  0  FULL   0  0  FULL    IP4  0  0  FULL   0  0  FULL    IP5  0  0  FULL   0 0 FULL     Lymphedema of the left hand                                            Right Left                                   Tender Tender    Costochondral  + +   Low cervical + +   suboccipital + +   Trapezius  + +   Supraspinatus  + +   Lateral epicondyl + +   Gluteal + +   Greater trochanter  + +   knees + +     Back-tenderness to palpation bilateral lumbar spine and paraspinal area, bilateral SI joint tenderness  Eyes:  PERRL, extra ocular movements intact, conjunctiva normal   HEENT:  Atraumatic, normocephalic, external ears normal, oropharynx moist, no pharyngeal exudates. Respiratory:  No respiratory distress. Bilateral wheezing  GI:  Soft, nondistended, normal bowel sounds, nontender, noorganomegaly, no mass, no rebound, no guarding   :  No costovertebral angle tenderness   Integument:  Well hydrated, no rash or telangiectasias  Lymphatic:  No lymphadenopathy noted   Neurologic:   Alert & oriented x 3, CN 2-12 normal, no focal deficits noted. Sensations Intact. Muscle strength 5/5 proximally and distally in upper and lower extremities.    Psychiatric:  Speech and behavior appropriate   Extremities:2+ pedal edema in the right lower extremity up to the knee and 1+ pedal edema in the left lower extremity        LABS AND IMAGING  Outside data reviewed and in HPI    Lab Results   Component Value Date/Time    WBC 8.3 08/22/2022 03:25 PM    RBC 4.07 08/22/2022 03:25 PM    RBC 3.57 05/16/2017 03:47 PM    HGB 11.5 08/22/2022 03:25 PM    HCT 35.2 08/22/2022 03:25 PM     08/22/2022 03:25 PM    MCV 86.6 08/22/2022 03:25 PM    MCH 28.2 08/22/2022 03:25 PM    MCHC 32.6 08/22/2022 03:25 PM    RDW 13.8 08/22/2022 03:25 PM    SEGSPCT 63.9 07/08/2010 03:23 PM    BANDSPCT 1 01/11/2018 04:46 PM    LYMPHOPCT 25.9 06/14/2022 03:08 PM    LYMPHOPCT 26.2 05/16/2017 03:47 PM    MONOPCT 8.1 06/14/2022 03:08 PM    EOSPCT 0.8 07/08/2010 03:23 PM    BASOPCT 0.5 06/14/2022 03:08 PM    MONOSABS 0.6 06/14/2022 03:08 PM    LYMPHSABS 1.9 06/14/2022 03:08 PM    EOSABS 0.1 06/14/2022 03:08 PM    BASOSABS 0.0 06/14/2022 03:08 PM    DIFFTYPE Auto-K 07/08/2010 03:23 PM       Chemistry        Component Value Date/Time     08/22/2022 1525    K 4.8 08/22/2022 1525    K 4.4 10/29/2020 0429    CL 93 (L) 08/22/2022 1525    CO2 33 (H) 08/22/2022 1525    BUN 39 (H) 08/22/2022 1525    CREATININE 1.5 (H) 08/22/2022 1525        Component Value Date/Time    CALCIUM 9.3 08/22/2022 1525    ALKPHOS 133 (H) 08/22/2022 1525    AST 18 08/22/2022 1525    ALT 12 08/22/2022 1525    BILITOT <0.2 08/22/2022 1525          Lab Results   Component Value Date    SEDRATE 93 (H) 08/17/2021     Lab Results   Component Value Date    CRP 8.0 (H) 08/17/2021     Lab Results   Component Value Date/Time    BRENNA Negative 07/08/2010 03:23 PM     Lab Results   Component Value Date/Time    RF <10.0 08/10/2021 03:25 PM     Lab Results   Component Value Date/Time    BRENNA Negative 07/08/2010 03:23 PM    ANATITER 1:80 08/10/2021 03:25 PM     No results found for: DSDNAG, DSDNAIGGIFA  No results found for: SSAROAB, SSALAAB  No results found for: SMAB, RNPAB  No results found for: CENTABIGG  No results found for: C3, C4, ACE  Lab Results   Component Value Date/Time    VITD25 51.5 08/22/2022 10:30 AM     Lab Results   Component Value Date    LABURIC 3.7 06/14/2022     Lab Results   Component Value Date    EEXUDXEX40 396 01/29/2020     Lab Results   Component Value Date    TSHFT4 0.45 08/10/2021    TSH 1.79 08/22/2022     Lab Results   Component Value Date    VITD25 51.5 08/22/2022     Bilateral hand, lumbar, cervical spine, SI joint x-rays 11/2/2021  Cervical spine: Mild endplate degenerative changes. Minimal retrolisthesis   of C2 on C3 and anterolisthesis of C4 on C5. Lumbar spine: Mild multilevel endplate degenerative changes. SI joints: No radiographic evidence of sacroiliitis. Mild bilateral SI joint   and hip joint degenerative changes.        Hands: Significant soft tissue swelling of the left hand and wrist.       Degenerative changes at

## 2022-09-20 RX ORDER — SENNOSIDES AND DOCUSATE SODIUM 8.6; 5 MG/1; MG/1
TABLET, FILM COATED ORAL
Qty: 360 TABLET | Refills: 0 | OUTPATIENT
Start: 2022-09-20

## 2022-09-21 ENCOUNTER — TELEPHONE (OUTPATIENT)
Dept: INTERNAL MEDICINE CLINIC | Age: 52
End: 2022-09-21

## 2022-09-21 DIAGNOSIS — M50.30 DDD (DEGENERATIVE DISC DISEASE), CERVICAL: Primary | ICD-10-CM

## 2022-09-21 DIAGNOSIS — E11.42 DIABETIC POLYNEUROPATHY ASSOCIATED WITH TYPE 2 DIABETES MELLITUS (HCC): ICD-10-CM

## 2022-09-21 DIAGNOSIS — R29.898 WEAKNESS OF RIGHT LOWER EXTREMITY: ICD-10-CM

## 2022-09-21 DIAGNOSIS — M51.36 DDD (DEGENERATIVE DISC DISEASE), LUMBAR: ICD-10-CM

## 2022-09-21 NOTE — TELEPHONE ENCOUNTER
Renee Maciel from [address] [place] [address] is requesting [facility]. He would like the order faxed to [phone].

## 2022-09-27 DIAGNOSIS — I42.9 CARDIOMYOPATHY, UNSPECIFIED TYPE (HCC): Chronic | ICD-10-CM

## 2022-09-27 DIAGNOSIS — I25.10 CORONARY ARTERY DISEASE DUE TO LIPID RICH PLAQUE: Chronic | ICD-10-CM

## 2022-09-27 DIAGNOSIS — N28.9 RENAL INSUFFICIENCY: Chronic | ICD-10-CM

## 2022-09-27 DIAGNOSIS — I50.30 (HFPEF) HEART FAILURE WITH PRESERVED EJECTION FRACTION (HCC): Chronic | ICD-10-CM

## 2022-09-27 DIAGNOSIS — I25.10 CORONARY ARTERY DISEASE INVOLVING NATIVE CORONARY ARTERY OF NATIVE HEART WITHOUT ANGINA PECTORIS: Chronic | ICD-10-CM

## 2022-09-27 DIAGNOSIS — I25.83 CORONARY ARTERY DISEASE DUE TO LIPID RICH PLAQUE: Chronic | ICD-10-CM

## 2022-09-28 ENCOUNTER — OFFICE VISIT (OUTPATIENT)
Dept: INTERNAL MEDICINE CLINIC | Age: 52
End: 2022-09-28
Payer: COMMERCIAL

## 2022-09-28 VITALS
OXYGEN SATURATION: 93 % | WEIGHT: 202 LBS | SYSTOLIC BLOOD PRESSURE: 100 MMHG | DIASTOLIC BLOOD PRESSURE: 62 MMHG | HEIGHT: 64 IN | HEART RATE: 71 BPM | BODY MASS INDEX: 34.49 KG/M2

## 2022-09-28 DIAGNOSIS — E89.89 LYMPHEDEMA OF UPPER EXTREMITY FOLLOWING LYMPHADENECTOMY: ICD-10-CM
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
## 2022-09-28 DIAGNOSIS — E11.65 POORLY CONTROLLED TYPE 2 DIABETES MELLITUS (HCC): ICD-10-CM

## 2022-09-28 DIAGNOSIS — S51.851D DOG BITE OF RIGHT FOREARM, SUBSEQUENT ENCOUNTER: ICD-10-CM

## 2022-09-28 DIAGNOSIS — I25.10 CORONARY ARTERY DISEASE INVOLVING NATIVE CORONARY ARTERY OF NATIVE HEART WITHOUT ANGINA PECTORIS: ICD-10-CM

## 2022-09-28 DIAGNOSIS — R06.02 SOB (SHORTNESS OF BREATH): ICD-10-CM

## 2022-09-28 DIAGNOSIS — I10 ESSENTIAL HYPERTENSION: ICD-10-CM

## 2022-09-28 DIAGNOSIS — I50.32 CHRONIC HEART FAILURE WITH PRESERVED EJECTION FRACTION (HCC): ICD-10-CM

## 2022-09-28 DIAGNOSIS — S61.452D DOG BITE, HAND, LEFT, SUBSEQUENT ENCOUNTER: Primary | ICD-10-CM
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
## 2022-09-28 DIAGNOSIS — S61.451D DOG BITE, HAND, RIGHT, SUBSEQUENT ENCOUNTER: ICD-10-CM
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
## 2022-09-28 DIAGNOSIS — S62.502D CLOSED NONDISPLACED FRACTURE OF PHALANX OF LEFT THUMB WITH ROUTINE HEALING, UNSPECIFIED PHALANX, SUBSEQUENT ENCOUNTER: ICD-10-CM

## 2022-09-28 DIAGNOSIS — N18.4 CKD (CHRONIC KIDNEY DISEASE) STAGE 4, GFR 15-29 ML/MIN (HCC): ICD-10-CM

## 2022-09-28 DIAGNOSIS — I89.0 LYMPHEDEMA OF UPPER EXTREMITY FOLLOWING LYMPHADENECTOMY: ICD-10-CM

## 2022-09-28 LAB
ANION GAP SERPL CALCULATED.3IONS-SCNC: 13 MMOL/L (ref 3–16)
BUN BLDV-MCNC: 44 MG/DL (ref 7–20)
CALCIUM SERPL-MCNC: 9.8 MG/DL (ref 8.3–10.6)
CHLORIDE BLD-SCNC: 90 MMOL/L (ref 99–110)
CO2: 32 MMOL/L (ref 21–32)
CREAT SERPL-MCNC: 1.5 MG/DL (ref 0.6–1.1)
GFR AFRICAN AMERICAN: 44
GFR NON-AFRICAN AMERICAN: 36
GLUCOSE BLD-MCNC: 193 MG/DL (ref 70–99)
POTASSIUM SERPL-SCNC: 4.6 MMOL/L (ref 3.5–5.1)
PRO-BNP: 369 PG/ML (ref 0–124)
SODIUM BLD-SCNC: 135 MMOL/L (ref 136–145)

## 2022-09-28 PROCEDURE — 99214 OFFICE O/P EST MOD 30 MIN: CPT | Performed by: NURSE PRACTITIONER

## 2022-09-28 PROCEDURE — 3052F HG A1C>EQUAL 8.0%<EQUAL 9.0%: CPT | Performed by: NURSE PRACTITIONER

## 2022-09-28 PROCEDURE — G8427 DOCREV CUR MEDS BY ELIG CLIN: HCPCS | Performed by: NURSE PRACTITIONER

## 2022-09-28 PROCEDURE — 2022F DILAT RTA XM EVC RTNOPTHY: CPT | Performed by: NURSE PRACTITIONER

## 2022-09-28 PROCEDURE — 3017F COLORECTAL CA SCREEN DOC REV: CPT | Performed by: NURSE PRACTITIONER

## 2022-09-28 PROCEDURE — 1036F TOBACCO NON-USER: CPT | Performed by: NURSE PRACTITIONER

## 2022-09-28 PROCEDURE — G8417 CALC BMI ABV UP PARAM F/U: HCPCS | Performed by: NURSE PRACTITIONER

## 2022-09-28 NOTE — PROGRESS NOTES
9/28/22     Chief Complaint   Patient presents with    Other     Open wounds d/t dog bites     HPI    Here for ED follow up   Was in the ED on 9/22 after trying to break up a dog fight   She has multiple superficial lacerations to both hands and right forearm   is cleaning with alcohol and peroxide  She has a closed fracture of phalanx of left thumb  Being treated with 7 days of augmentin - doing well an medication   She has an ortho/ hand appt scheduled on 9/30 at 3 pm   Today she complains of continued pain and swelling   Checking her daily weights she was 199lb  before dog fight and is 202 today   She is taking torsemide 100mg in am and 50 mg in evening, she is on metolazone twice a week, spirolactone 100 mg in am and 50 mg in pm   She has needed IVP and gtt furosemide in the past - last needed 9/1/2022   She is due for standing labs with cardiology today   Reports home glucose is running up and down - mostly in 200s  Her last A1c was 8.6 on 8/22     X-ray Radius Ulna Left 2-views   Final Result   Findings/impression:     1. Acute, comminuted, intra-articular fracture involving left thumb interphalangeal joint. 2. Diffuse soft tissue swelling, left greater than right.    3. Osteoarthritis of bilateral scattered interphalangeal joints      Allergies   Allergen Reactions    Iv Dye [Iodides] Anaphylaxis     allergic to ct scan dye, not the MRI    Diazepam Other (See Comments)    Insulin Glargine Other (See Comments)     High blood sugar     Trazodone And Nefazodone Other (See Comments)     Makes patient feel very sluggish       Current Outpatient Medications   Medication Sig Dispense Refill    metoprolol tartrate (LOPRESSOR) 25 MG tablet TAKE 1 TABLET BY MOUTH TWO TIMES A  tablet 3    Febuxostat 80 MG TABS TAKE 1 TABLET BY MOUTH EVERY DAY 90 tablet 1    leflunomide (ARAVA) 20 MG tablet TAKE 1 TABLET BY MOUTH EVERY DAY 90 tablet 0    sulfaSALAzine (AZULFIDINE) 500 MG tablet Take 1 tablet by mouth two kit 0    Lidocaine 5 % CREA Apply to feet QID prn pain for peripheral neuropathy 30 g 5    butalbital-acetaminophen-caffeine (FIORICET, ESGIC) -40 MG per tablet Take 1 tablet by mouth every 6 hours as needed for Headaches 30 tablet 1    rosuvastatin (CRESTOR) 10 MG tablet Take 1 tablet by mouth nightly 90 tablet 3    ranolazine (RANEXA) 500 MG extended release tablet TAKE 1 TABLET BY MOUTH TWO TIMES A  tablet 3    Urea 40 % LOTN Apply to feet nightly and cover with Aquaphor 325 mL 5    mineral oil-hydrophilic petrolatum (HYDROPHOR) ointment Apply topically as needed to hands and feet. 454 g 5    ONETOUCH VERIO strip use to test blood sugar 5 times daily 200 strip 5    metOLazone (ZAROXOLYN) 2.5 MG tablet TAKE 1 TABLET BY MOUTH TWO TIMES A WEEK AS NEEDED FOR WEIGHT OVER 180 POUNDS. DO NOT TAKE 2 DAYS IN A ROW. (Patient taking differently: Take 2.5 mg by mouth twice a week) 24 tablet 0    Continuous Blood Gluc  (FREESTYLE EMERALD 2 READER) MINDY To check glucose levels 1 each 0    albuterol (PROVENTIL) (2.5 MG/3ML) 0.083% nebulizer solution Take 3 mLs by nebulization every 6 hours as needed for Wheezing or Shortness of Breath 120 each 1    polyethylene glycol (GLYCOLAX) 17 GM/SCOOP powder TAKE 17 GRAMS (1 CAPFUL) BY MOUTH NIGHTLY 510 g 0    LINZESS 290 MCG CAPS capsule TAKE 1 CAPSULE BY MOUTH ONE TIME A DAY FOR 90 DAYS      REXULTI 2 MG TABS tablet Take 2 mg by mouth at bedtime       methocarbamol (ROBAXIN-750) 750 MG tablet Take 1 tablet by mouth 3 times daily as needed (pain and spasm) 30 tablet 0    nitroGLYCERIN (NITROSTAT) 0.4 MG SL tablet PLACE ONE TABLET UNDER TONGUE AS NEEDED FOR CHEST PAIN, MAY REPEAT EVERY 5 MINUTES AS NEEDED UP TO A MAX OF 3 TABLETS.   IF NO RELIEF AFTER 1 DOSE, CALL 911. 25 tablet 0    nystatin (MYCOSTATIN) 726206 UNIT/ML suspension Take 5 mLs by mouth 4 times daily 500 mL 1    traZODone (DESYREL) 50 MG tablet Take 1 tablet by mouth nightly Take it on the day of sleep study 1 tablet 0    loratadine (CLARITIN) 10 MG tablet TAKE 1 TABLET BY MOUTH ONE TIME A DAY  30 tablet 5    diazePAM (VALIUM) 5 MG tablet Take 5 mg by mouth 2 times daily as needed for Anxiety. OXYGEN Inhale 2 L into the lungs nightly Sometimes with activity      oxyCODONE (OXYCONTIN) 20 MG extended release tablet Take 20 mg by mouth every 12 hours. aspirin 81 MG EC tablet Take 81 mg by mouth daily      benztropine (COGENTIN) 1 MG tablet Take 1 mg by mouth nightly       oxyCODONE-acetaminophen (PERCOCET)  MG per tablet Take 1 tablet by mouth every 4 hours as needed for Pain . Earliest Fill Date: 6/8/17 100 tablet 0    duloxetine (CYMBALTA) 60 MG capsule Take 60 mg by mouth 2 times daily. colchicine (COLCRYS) 0.6 MG tablet Take 1 tablet by mouth every 48 hours (Patient not taking: Reported on 9/28/2022) 45 tablet 1    Compression Stockings MISC by Does not apply route Zippered stockings for daily use on lower extremities (do not wear at night). 20-30mmHg. (Patient not taking: Reported on 9/28/2022) 1 each 1    Magic Mouthwash (MIRACLE MOUTHWASH) Swish and spit 5 mLs 4 times daily as needed for Irritation or Pain (Patient not taking: Reported on 9/28/2022) 300 mL 2    ketoconazole (NIZORAL) 2 % shampoo Apply topically daily as needed. (Patient not taking: Reported on 9/28/2022) 120 mL 1     No current facility-administered medications for this visit. Review of Systems  Negative other than HPI     Vitals:    09/28/22 1500   BP: 100/62   Pulse: 71   SpO2: 93%   Weight: 202 lb (91.6 kg)   Height: 5' 4\" (1.626 m)      Physical Exam  Constitutional:       General: She is not in acute distress. Appearance: Normal appearance. She is not ill-appearing. HENT:      Head: Normocephalic and atraumatic. Pulmonary:      Effort: Pulmonary effort is normal. No respiratory distress.    Musculoskeletal:      Comments: BUE with lymphedema, L>R   Multiple lacerations to BUE - CDI   Sutures should

## 2022-09-30 ENCOUNTER — TELEPHONE (OUTPATIENT)
Dept: CARDIOLOGY CLINIC | Age: 52
End: 2022-09-30

## 2022-09-30 NOTE — TELEPHONE ENCOUNTER
----- Message from Kit Robles MD sent at 9/28/2022 11:15 PM EDT -----  Call patient and let her know that her labs look good. No changes.   ARETHA

## 2022-09-30 NOTE — PROGRESS NOTES
[address]  Advanced CHF/Pulmonary Hypertension   Cardiac Follow up       Dayo Harrison  YOB: 1970    Date of Visit:  10/3/22    Chief Complaint   Patient presents with    Congestive Heart Failure    Shortness of Breath    Dizziness       History of Present Illness:  Ms. Tad Bentley is a  47 y.o. female with chronic dCHF, CAD with cardiac cath 2014 at [facility] showing diffuse LAD disease and small vessel disease with normal RCA and LCX (treated medically). Breast cancer 8+ years ago s/p mastectomy. LUIS with cpap non compliant. Her cardiac cath in 2014 showed diffuse LAD disease . She has had issues with fluid overload but multiple echos have shown normal EF and diastolic function. Cardiac MRI performed showed normal LVEF 61% and no evidence of constriction. She was hospitalized 1/12-1/19/18 CXR showed mild pulmonary edema, proBNP 277. VQ neg for PE and flu negative. She was treated for exacerbation of asthma and HFpEF. ~ten years ago she had radiation for breast cancer. She follows with endocrinology Dr. Avel Rodrigues. She had a LHC/RHC  2/28/18 to r/o pericardial constriction as cause for her frequent episodes of fluid overload due to her history of radiation therapy to left breast. Also was looking for restriction that could cause fluid build up as well. No evidence found. Coronaries showed small vessel CAD due to diabetes. No constriction or restriction. PCWP is about 21 and normal. She has chronic rheumatoid arthritis, gout and lymphedema with CKD. She takes pain meds and took one Percocet before coming to the office, and she also takes Oxycontin. She has constipation with pain meds and takes metamucil. At last visit 8/10/21, she was sent to the infusion center for Lasix 120mg IVP and 20mg/hr x 2 hours. 9/1/22 Lasix 120mg IVP followed by lasix gtt 20mg/hr for 4 hours in the infusion center. She continues to see Dr. Karla Carr.  She continues with the left sided lymphedema, not much improvement. 9/22/22 she was seen in the ED for hand lacerations due to dog bites and a fractured left thumb. She was placed on Augmentin for one week and her hand was splinted. Today, she is here for regular follow up. She is here with her . Her left hand is extremely swollen. They state her weight varies significantly from week to week. She states she is not hunger. Her Echo today looks good with EF of 55-60%. She states if she gets IV Lasix, she does have loss of weight but that the weight does come back. Metolazone is on Wednesday and Sunday. Discussed doing IV Lasix on a Metolazone day. Allergies   Allergen Reactions    Iv Dye [Iodides] Anaphylaxis     allergic to ct scan dye, not the MRI    Diazepam Other (See Comments)    Insulin Glargine Other (See Comments)     High blood sugar     Trazodone And Nefazodone Other (See Comments)     Makes patient feel very sluggish     Current Outpatient Medications   Medication Sig Dispense Refill    metoprolol tartrate (LOPRESSOR) 25 MG tablet TAKE 1 TABLET BY MOUTH TWO TIMES A  tablet 3    Febuxostat 80 MG TABS TAKE 1 TABLET BY MOUTH EVERY DAY 90 tablet 1    leflunomide (ARAVA) 20 MG tablet TAKE 1 TABLET BY MOUTH EVERY DAY 90 tablet 0    sulfaSALAzine (AZULFIDINE) 500 MG tablet Take 1 tablet by mouth two times daily 180 tablet 0    pregabalin (LYRICA) 75 MG capsule TAKE 1 CAPSULE BY MOUTH TWO TIMES A DAY 60 capsule 2    simvastatin (ZOCOR) 20 MG tablet TAKE 1 TABLET BY MOUTH ONCE DAILY AT NIGHT. 90 tablet 0    omeprazole (PRILOSEC) 20 MG delayed release capsule TAKE 1 CAPSULE BY MOUTH TWO TIMES A DAY 60 capsule 0    torsemide (DEMADEX) 100 MG tablet TAKE 1 TABLET BY MOUTH IN THE MORNING AND 1/2 TABLET IN THE EVENING.  135 tablet 0    spironolactone (ALDACTONE) 50 MG tablet TAKE 2 TABLETS BY MOUTH IN THE MORNING and 1 tablet in the evening 270 tablet 0    solifenacin (VESICARE) 10 MG tablet Take 1 tablet by mouth daily 90 tablet 3    meclizine (ANTIVERT) 25 MG tablet Take 1 tablet by mouth 3 times daily as needed for Dizziness 30 tablet 2    montelukast (SINGULAIR) 10 MG tablet Take 1 tablet by mouth nightly 30 tablet 5    TRESIBA FLEXTOUCH 200 UNIT/ML SOPN INJECT 200 UNITS SUBCUTANEOUSLY ONE TIME DAILY 54 mL 3    lubiprostone (AMITIZA) 24 MCG capsule TAKE 1 CAPSULE BY MOUTH TWO TIMES A DAY WITH MEALS 60 capsule 5    insulin aspart (NOVOLOG FLEXPEN) 100 UNIT/ML injection pen inject 30 to 50 units into the skin three time daily before meals 60 mL 3    levothyroxine (SYNTHROID) 150 MCG tablet TAKE 1 TABLET BY MOUTH IN THE MORNING before breakfast 90 tablet 1    vitamin D3 (CHOLECALCIFEROL) 25 MCG (1000 UT) TABS tablet TAKE 3 TABLETS BY MOUTH ONE TIME A DAY 90 tablet 5    STEGLATRO 5 MG TABS TAKE 1 TABLET BY MOUTH EVERY DAY 90 tablet 1    FEROSUL 325 (65 Fe) MG tablet TAKE 1 TABLET BY MOUTH TWO TIMES A DAY WITH MEALS 180 tablet 0    butalbital-acetaminophen-caffeine (FIORICET, ESGIC) -40 MG per tablet Take 1 tablet by mouth every 6 hours as needed for Headaches 30 tablet 1    ranolazine (RANEXA) 500 MG extended release tablet TAKE 1 TABLET BY MOUTH TWO TIMES A  tablet 3    metOLazone (ZAROXOLYN) 2.5 MG tablet TAKE 1 TABLET BY MOUTH TWO TIMES A WEEK AS NEEDED FOR WEIGHT OVER 180 POUNDS.  DO NOT TAKE 2 DAYS IN A ROW. (Patient taking differently: Take 2.5 mg by mouth twice a week) 24 tablet 0    albuterol (PROVENTIL) (2.5 MG/3ML) 0.083% nebulizer solution Take 3 mLs by nebulization every 6 hours as needed for Wheezing or Shortness of Breath 120 each 1    LINZESS 290 MCG CAPS capsule TAKE 1 CAPSULE BY MOUTH ONE TIME A DAY FOR 90 DAYS      REXULTI 2 MG TABS tablet Take 2 mg by mouth at bedtime       methocarbamol (ROBAXIN-750) 750 MG tablet Take 1 tablet by mouth 3 times daily as needed (pain and spasm) 30 tablet 0    nitroGLYCERIN (NITROSTAT) 0.4 MG SL tablet PLACE ONE TABLET UNDER TONGUE AS NEEDED FOR CHEST PAIN, MAY REPEAT EVERY 5 MINUTES AS NEEDED UP TO A MAX OF 3 TABLETS. IF NO RELIEF AFTER 1 DOSE, CALL 911. 25 tablet 0    traZODone (DESYREL) 50 MG tablet Take 1 tablet by mouth nightly Take it on the day of sleep study 1 tablet 0    loratadine (CLARITIN) 10 MG tablet TAKE 1 TABLET BY MOUTH ONE TIME A DAY  30 tablet 5    diazePAM (VALIUM) 5 MG tablet Take 5 mg by mouth 2 times daily as needed for Anxiety. OXYGEN Inhale 2 L into the lungs nightly Sometimes with activity      oxyCODONE (OXYCONTIN) 20 MG extended release tablet Take 20 mg by mouth every 12 hours. aspirin 81 MG EC tablet Take 81 mg by mouth daily      benztropine (COGENTIN) 1 MG tablet Take 1 mg by mouth nightly       oxyCODONE-acetaminophen (PERCOCET)  MG per tablet Take 1 tablet by mouth every 4 hours as needed for Pain . Earliest Fill Date: 6/8/17 100 tablet 0    duloxetine (CYMBALTA) 60 MG capsule Take 60 mg by mouth 2 times daily. SM SENNA-S 8.6-50 MG per tablet TAKE 2 TABLETS BY MOUTH TWO TIMES A  tablet 5    Continuous Blood Gluc Sensor (FREESTYLE EMERALD 2 SENSOR) MISC Change every 14 days 2 each 5    Insulin Pen Needle (B-D UF III MINI PEN NEEDLES) 31G X 5 MM MISC use five times daily with insulin 200 each 5    Blood Glucose Monitoring Suppl (ONETOUCH VERIO) w/Device KIT Use to check glucose 4 times daily. 1 kit 0    Lidocaine 5 % CREA Apply to feet QID prn pain for peripheral neuropathy 30 g 5    rosuvastatin (CRESTOR) 10 MG tablet Take 1 tablet by mouth nightly (Patient not taking: Reported on 10/3/2022) 90 tablet 3    Urea 40 % LOTN Apply to feet nightly and cover with Aquaphor 325 mL 5    mineral oil-hydrophilic petrolatum (HYDROPHOR) ointment Apply topically as needed to hands and feet. 454 g 5    Compression Stockings MISC by Does not apply route Zippered stockings for daily use on lower extremities (do not wear at night). 20-30mmHg.  (Patient not taking: Reported on 9/28/2022) 1 each 1    ONETOUCH VERIO strip use to test blood sugar 5 times daily 200 strip 5    Continuous Blood Gluc  (FREESTYLE EMERALD 2 READER) MINDY To check glucose levels 1 each 0    polyethylene glycol (GLYCOLAX) 17 GM/SCOOP powder TAKE 17 GRAMS (1 CAPFUL) BY MOUTH NIGHTLY 510 g 0    nystatin (MYCOSTATIN) 057397 UNIT/ML suspension Take 5 mLs by mouth 4 times daily 500 mL 1    ketoconazole (NIZORAL) 2 % shampoo Apply topically daily as needed. (Patient not taking: Reported on 9/28/2022) 120 mL 1     No current facility-administered medications for this visit.      Past Medical History:   Diagnosis Date    Anxiety     Anxiety and depression     Arthritis     not sure of specific type    Asthma     CAD (coronary artery disease)     Cancer ([facility] [address] 2007    left breast    Cerebral artery occlusion with cerebral infarction ([facility] [address]     2000, 2001    CHF (congestive heart failure) ([facility] [address] 2018    Chronic kidney disease     renal insufficency/to see Dr My Wangr    Chronic pain     Constipation     COPD (chronic obstructive pulmonary disease) ([facility] [address]     Depression     Diabetes mellitus ([facility] [address]     Diabetic polyneuropathy associated with type 2 diabetes mellitus ([facility] [address] 2/2/2018    Dysthymia 2/2/2018    ESBL (extended spectrum beta-lactamase) producing bacteria infection 03/14/2018    urine    Fibromyalgia     Gastric ulcer, unspecified as acute or chronic, without mention of hemorrhage, perforation, or obstruction     GERD (gastroesophageal reflux disease)     Gout     HIGH CHOLESTEROL     Hypertension     Hypothyroidism     Pneumonia 3/25/2020    Pyelonephritis     Severe persistent asthma without complication 6/8/9231    Thyroid disease     TIA (transient ischemic attack)     with occasional left leg and hand weakness     Past Surgical History:   Procedure Laterality Date    BREAST SURGERY      left mastectomy    CARPAL TUNNEL RELEASE      Bilateral    FINGER CONTRACTURE SURGERY      HYSTERECTOMY (CERVIX STATUS UNKNOWN)  2005    USO    MASTECTOMY TONSILLECTOMY      UPPER GASTROINTESTINAL ENDOSCOPY  2019    stretched esophagous      Family History   Problem Relation Age of Onset    Asthma Other     Cancer Other     Depression Other     Diabetes Other     Hypertension Other     High Cholesterol Other     Migraines Other     Heart Attack Father 72         of MI    High Blood Pressure Mother     Diabetes Mother      Social History     Socioeconomic History    Marital status:      Spouse name: Toña Arevalo    Number of children: 3    Years of education: Not on file    Highest education level: Not on file   Occupational History    Occupation: disabled   Tobacco Use    Smoking status: Never    Smokeless tobacco: Never   Vaping Use    Vaping Use: Never used   Substance and Sexual Activity    Alcohol use: No    Drug use: No    Sexual activity: Not Currently   Other Topics Concern    Not on file   Social History Narrative    Not on file     Social Determinants of Health     Financial Resource Strain: Low Risk     Difficulty of Paying Living Expenses: Not hard at all   Food Insecurity: No Food Insecurity    Worried About Running Out of Food in the Last Year: Never true    Ran Out of Food in the Last Year: Never true   Transportation Needs: Not on file   Physical Activity: Not on file   Stress: Not on file   Social Connections: Not on file   Intimate Partner Violence: Not on file   Housing Stability: Not on file     Review of Systems:   Constitutional: there has been no unanticipated weight loss. There's been no change in energy level, sleep pattern, or activity level. Eyes: No visual changes or diplopia. No scleral icterus. ENT: No Headaches, hearing loss or vertigo. No mouth sores or sore throat. Cardiovascular: Reviewed in HPI  Respiratory: No cough or wheezing, no sputum production. No hematemesis. Gastrointestinal: No abdominal pain, appetite loss, blood in stools. No change in bowel or bladder habits.   Genitourinary: No dysuria, trouble voiding, or hematuria. Musculoskeletal:  No gait disturbance, weakness or joint complaints. Integumentary: No rash or pruritis. Neurological: No headache, diplopia, change in muscle strength, numbness or tingling. No change in gait, balance, coordination, mood, affect, memory, mentation, behavior. Psychiatric: No anxiety, no depression. Endocrine: No malaise, fatigue or temperature intolerance. No excessive thirst, fluid intake, or urination. No tremor. Hematologic/Lymphatic: No abnormal bruising or bleeding, blood clots or swollen lymph nodes. Allergic/Immunologic: No nasal congestion or hives. Physical Examination:    BP (!) 116/56   Pulse 70   Ht 5' 4\" (1.626 m)   Wt 201 lb (91.2 kg)   SpO2 91%   BMI 34.50 kg/m²       Wt Readings from Last 3 Encounters:   10/03/22 201 lb (91.2 kg)   09/28/22 202 lb (91.6 kg)   09/14/22 200 lb 12.8 oz (91.1 kg)     BP Readings from Last 3 Encounters:   10/03/22 (!) 116/56   09/28/22 100/62   09/14/22 120/80     Constitutional and General Appearance:   WD/WN, less swollen than usual  HEENT:  NC/AT  XANDER  No problems with hearing  Skin:  Warm, dry  Respiratory:  Normal excursion and expansion without use of accessory muscles  Resp Auscultation: Normal breath sounds without dullness  Cardiovascular: The apical impulses not displaced  Heart tones are crisp and normal  Cervical veins are not engorged  The carotid upstroke is normal in amplitude and contour without delay or bruit  JVP 8-9 cm H2O  RRR with nl S1 and S2 without m,r,g  Peripheral pulses are symmetrical and full  There is no clubbing, cyanosis of the extremities. Bilateral  hand edema is increased  Fingers very swollen. L>R  bilateral trace leg edema.   R>L  Left Hand is VERY enlarged/swollen  Femoral Arteries: 2+ and equal  Pedal Pulses: 2+ and equal   Neck:  No thyromegaly  Abdomen:  abdominal girth soft  No masses or tenderness  Liver/Spleen: No Abnormalities Noted  Neurological/Psychiatric:  Alert and oriented in all spheres  Moves all extremities well  Exhibits normal gait balance and coordination  No abnormalities of mood, affect, memory, mentation, or behavior are noted    Diagnostics:    Stress Test 7-1-2020   There is normal isotope uptake at stress and rest. There is no evidence of     myocardial ischemia or scar. Normal LV function. Left ventricular ejection fraction of 59 %. Overall findings represent a low risk scan. Stress Protocols          Resting ECG     Normal sinus rhythm. Left Heart Cath 2/27/18:  Dominance : Right     LM: bifurcating, MLI  LAD: mild plaquing with small vessel disease distally   LCx: no significant disease  RCA: MLI     LVEDP: 25 mmHg   No Ao gradient     Right Heart Cath   RA: 13  RV: 47/6/16  PA:   46/19    and mean of 32  PCWP: 21 mmHg      Sats:  Ao: 92%  RA: 61%  PA: 62% (x 2)   CO/CI : 6.1 L    CXR 1/15/18:  No acute cardiopulmonary disease     Echo 11/8/2017:  Normal left ventricle size and systolic function with an estimated ejection   fraction of 60%. No regional wall motion abnormalities are seen. There is borderline concentric left ventricular hypertrophy. E/e\"= 13     Stress test 11/8/2017: There is normal isotope uptake at stress and rest. There is no evidence of  myocardial ischemia or scar. Normal LV function. Overall findings represent a low risk scan. VQ scan negative 1/11/2018    CXR 1/16/18  No acute cardiopulmonary disease     Echo 11/8/2017:  Normal left ventricle size and systolic function with an estimated ejection fraction of 60%. No regional wall motion abnormalities are seen. There is borderline concentric left ventricular hypertrophy. E/e\"= 13     Stress test 11/8/2017: There is normal isotope uptake at stress and rest. There is no evidence of  myocardial ischemia or scar. Normal LV function. Overall findings represent a low risk scan. VQ scan 1/11/2018:  Normal study. No evidence of pulmonary embolus.     Labs were reviewed including labs from other hospital systems through [facility]. Cardiac testing was reviewed including echos, nuclear scans, cardiac catheterization, including from other hospital systems through [facility]. Assessment:  1. Chronic diastolic heart failure ([facility] [address]    2. Coronary artery disease involving native coronary artery of native heart without angina pectoris    3. Chronic heart failure with preserved ejection fraction ([address]    4. SOB (shortness of breath)    5. Lymphedema of upper extremity following lymphadenectomy    6. Essential hypertension         Chronic heart failure with preserved ejection fraction:    Stable, compensated. ProBNP 1/24/22> 280; 2/8/22> 508; 3/8/22> 166; 4/5/22> 291; 4/19/22> 273> 369. Keep weight at 175-180 pounds: Torsemide 100mg in the am and 50mg in the evening. Less than 174 pounds, take torsemide 50 mg twice daily. Continue Metolazone Wed & Sun only. Coronary artery disease  Chronic chest pain usually relieved with 1-2 NTG. Not worsening. [facility] 2014 with diffuse LAD disease and small vessel disease. SOB (shortness of breath), chronic     -Continue Spironolactone and Torsemide and Metolazone 2 times a week on Wednesday and Sunday  -She now does outpt labs. Essential hypertension  BP (!) 116/56   Pulse 70   Ht 5' 4\" (1.626 m)   Wt 201 lb (91.2 kg)   SpO2 91%   BMI 34.50 kg/m²   ~Stable. LUIS (obstructive sleep apnea)  Uses CPAP therapy. Hyperlipidemia  4/26/22 , HDL 32, , . LDL goal <70    2/2019> . Stop Zocor. Start Crestor 10mg daily. Renal insufficiency, chronic  9/28/22> creat 1.5 / BUN 44 / K+ 4.6    Lymphedema of upper extremity following lymphadenectomy:  L>R.     +++ chronic edema to left hand and arm as well as leg. Worse with weight gain  Try to keep them elevated. She uses automatic lymph machine which works better for her than compression hose.   Recommend still wearing hose after using the machine. Rheumatoid arthritis involving multiple sites  She is on chronic pain meds. PLAN:  All cardiac test and lab results personally reviewed by me during this office visit. 1.   Plan for IV Lasix 120mg on 10/6/22. Take Metolazone 2.5mg  30-60 minutes prior Lasix IV Push. 2.   Lasix 120mg IV push weekly at the [facility]. 3.   Continue all other medications. 4.   STOP Simvastatin. START Rosuvastatin 10mg for cholesterol management. 5.   Labs every 4 weeks. Get drawn with Lasix infusion. 6.  See Dr. Justice England in 4-6 months   7. Extra set of labs after first Lasix 120mg before next dose. Scribe's attestation: This note was scribed in the presence of Clyde Crow M.D. by Nidia Bright RN     The scribe's documentation has been prepared under my direction and personally reviewed by me in its entirety. I confirm that the note above accurately reflects all work, treatment, procedures, and medical decision making performed by me. Time Based Itemization  A total of 40 minutes was spent on today's patient encounter. If applicable, non-patient-facing activities:  ( x)Preparing to see the patient and reviewing records  (x ) Individual interpretation of results  (x ) Discussion or coordination of care with other health care professionals.     ( x) Ordering of unique tests, medications, or procedures  ( x) Documentation within the EHR                                             :      Clyde Crow MD [facility]

## 2022-10-03 ENCOUNTER — HOSPITAL ENCOUNTER (OUTPATIENT)
Dept: NON INVASIVE DIAGNOSTICS | Age: 52
Discharge: HOME | End: 2022-10-03
Payer: COMMERCIAL

## 2022-10-03 ENCOUNTER — OFFICE VISIT (OUTPATIENT)
Dept: CARDIOLOGY CLINIC | Age: 52
End: 2022-10-03
Payer: COMMERCIAL

## 2022-10-03 VITALS
WEIGHT: 201 LBS | HEART RATE: 70 BPM | SYSTOLIC BLOOD PRESSURE: 116 MMHG | BODY MASS INDEX: 34.31 KG/M2 | OXYGEN SATURATION: 91 % | DIASTOLIC BLOOD PRESSURE: 56 MMHG | HEIGHT: 64 IN

## 2022-10-03 DIAGNOSIS — I50.32 CHRONIC HEART FAILURE WITH PRESERVED EJECTION FRACTION (HCC): ICD-10-CM

## 2022-10-03 DIAGNOSIS — I50.32 CHRONIC DIASTOLIC HEART FAILURE (HCC): Primary | ICD-10-CM

## 2022-10-03 DIAGNOSIS — I89.0 LYMPHEDEMA OF UPPER EXTREMITY FOLLOWING LYMPHADENECTOMY: ICD-10-CM

## 2022-10-03 DIAGNOSIS — R06.02 SOB (SHORTNESS OF BREATH): ICD-10-CM

## 2022-10-03 DIAGNOSIS — E89.89 LYMPHEDEMA OF UPPER EXTREMITY FOLLOWING LYMPHADENECTOMY: ICD-10-CM

## 2022-10-03 DIAGNOSIS — I10 ESSENTIAL HYPERTENSION: ICD-10-CM

## 2022-10-03 DIAGNOSIS — I25.10 CORONARY ARTERY DISEASE INVOLVING NATIVE CORONARY ARTERY OF NATIVE HEART WITHOUT ANGINA PECTORIS: ICD-10-CM

## 2022-10-03 LAB
LV EF: 58 %
LVEF MODALITY: NORMAL

## 2022-10-03 PROCEDURE — 99215 OFFICE O/P EST HI 40 MIN: CPT | Performed by: INTERNAL MEDICINE

## 2022-10-03 PROCEDURE — G8417 CALC BMI ABV UP PARAM F/U: HCPCS | Performed by: INTERNAL MEDICINE

## 2022-10-03 PROCEDURE — 3017F COLORECTAL CA SCREEN DOC REV: CPT | Performed by: INTERNAL MEDICINE

## 2022-10-03 PROCEDURE — G8427 DOCREV CUR MEDS BY ELIG CLIN: HCPCS | Performed by: INTERNAL MEDICINE

## 2022-10-03 PROCEDURE — G8484 FLU IMMUNIZE NO ADMIN: HCPCS | Performed by: INTERNAL MEDICINE

## 2022-10-03 PROCEDURE — 93306 TTE W/DOPPLER COMPLETE: CPT

## 2022-10-03 PROCEDURE — 1036F TOBACCO NON-USER: CPT | Performed by: INTERNAL MEDICINE

## 2022-10-03 RX ORDER — NITROGLYCERIN 0.4 MG/1
TABLET SUBLINGUAL
Qty: 25 TABLET | Refills: 2 | Status: SHIPPED | OUTPATIENT
Start: 2022-10-03

## 2022-10-03 NOTE — PATIENT INSTRUCTIONS
PLAN:  All cardiac test and lab results personally reviewed by me during this office visit. 1.   Plan for IV Lasix 120mg on 10/6/22. Take Metolazone 2.5mg  30-60 minutes prior Lasix IVP. 2.   Lasix 120mg IV push weekly at the [facility]. 3.   Continue all other medications. 4.   STOP Simvastatin. START Rosuvastatin 10mg for cholesterol management. 5.   Labs every 4 weeks. Get drawn with Lasix infusion. 6.  See Dr. Meseret Stewart in 4-6 months   7. Extra set of labs after first Lasix 120mg before next dose.

## 2022-10-04 RX ORDER — SODIUM CHLORIDE 0.9 % (FLUSH) 0.9 %
5-40 SYRINGE (ML) INJECTION ONCE
Status: CANCELLED
Start: 2022-10-04 | End: 2022-10-04

## 2022-10-04 RX ORDER — FUROSEMIDE 10 MG/ML
120 INJECTION INTRAMUSCULAR; INTRAVENOUS ONCE
Status: CANCELLED
Start: 2022-10-04 | End: 2022-10-04

## 2022-10-05 ENCOUNTER — TELEPHONE (OUTPATIENT)
Dept: CARDIOLOGY CLINIC | Age: 52
End: 2022-10-05

## 2022-10-05 ENCOUNTER — CLINICAL DOCUMENTATION (OUTPATIENT)
Dept: ONCOLOGY | Age: 52
End: 2022-10-05

## 2022-10-05 ENCOUNTER — HOSPITAL ENCOUNTER (OUTPATIENT)
Dept: OCCUPATIONAL THERAPY | Age: 52
Setting detail: THERAPIES SERIES
Discharge: HOME | End: 2022-10-05
Payer: COMMERCIAL

## 2022-10-05 PROCEDURE — 97166 OT EVAL MOD COMPLEX 45 MIN: CPT

## 2022-10-05 PROCEDURE — 97760 ORTHOTIC MGMT&TRAING 1ST ENC: CPT

## 2022-10-05 PROCEDURE — 97110 THERAPEUTIC EXERCISES: CPT

## 2022-10-05 PROCEDURE — L3913 HFO W/O JOINTS CF: HCPCS

## 2022-10-05 RX ORDER — FUROSEMIDE 10 MG/ML
120 INJECTION INTRAMUSCULAR; INTRAVENOUS ONCE
Status: CANCELLED
Start: 2022-10-05 | End: 2022-10-05

## 2022-10-05 NOTE — PLAN OF CARE
[address], [address] [place] [address], [address]  Phone: [phone]   Fax: [phone]                                                     Sobeida Herrera    Dear Dr. José Miguel Vitale MD,    We had the pleasure of evaluating the following patient for occupational therapy services at [facility]. A summary of our findings can be found in the initial assessment below. This includes our plan of care. A thumb spica with IP immobilization was fabricated today and the patient was educated on the importance of gentle AROM to prevent stiffness. We will continue working on edema control to improve tolerance of and decrease limitations to ROM. The patient does report some numbness in R middle finger distal to laceration over the distal phalanx; no discoloration is noted. If you have any questions or concerns regarding these findings, please do not hesitate to contact me at the office phone number above. Thank you for the referral.     Referring Practitioner Signature:_______________________________Date:__________________  By signing above (or electronic signature), therapists plan is approved by the referring practitioner      Patient: Mansfield Boas   : 1970   MRN: [id number]  Referring Practitioner:  Dr. José Miguel Vitale MD   Evaluation Date: 10/5/2022      Medical Diagnosis Information: P11.190I - L thumb proximal phalanx fx and multiple lacerations on bilateral hands (dog bites 22)    Insurance information: Kateryna DOMINGUEZ, no ded, no copay    Precautions/ Contra-indications: thumb spica splint with IP immobilization \"at all times,\" no hand submersion in water    Latex Allergy:  [x]NO      []YES  Preferred Language for Healthcare:   []English       [x]Other: Georgian    C-SSRS Triggered by Intake questionnaire (Past 2 wk assessment ):   [x] No, Questionnaire did not trigger screening.    [] Yes, Patient intake triggered C-SSRS Screening     [] Completed, no further action required. [] Completed, PCP notified via [place]  Reason for Seeking OT Services and Patient Goals: Nasim Mcclendon is a 47 y.o. right-handed female seeking OT services following L thumb proximal phalanx fx and multiple bilateral hand lacerations sustained on 9/30/22 while breaking up a fight between her dogs. The pt speaks Azeri and an  Georgi Ghotra is present to assist. The pt followed by with Dr. Rosana Chi MD with  and the fx will be treated non-op with use of thumb spica. Pt presented to OT session without splint on L thumb and states she has not been in a splint since ortho appt on 9/30/22.     9/30/22 note, Dr. Monica Milton: \"3 view X-rays of the bilateral hands dated September 2 and 1 view of the 2 left thumb dated today were reviewed independently by Dr. Monica Milton and discussed with the patient. The films revealed: Left thumb IPJ fracture and osteoarthritis of the bilateral second third and fourth DIP joints. No fractures identified on the right hand. 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 Pt's  drives. Pt is receiving assist for \"most\" tasks \"as needed\" at this time from . Home Environment: Pt lives in a home with her . Pt has an adult son who checks in on her as needed. Personal, Temporal, and Virtual Contexts: Extensive health history noted below. Pt reports significant BUE edema at baseline secondary to CHF with L>R. When asked if swelling in L hand was present prior to injury, pt reports the only change is increased swelling in thumb.  Pt prefers female therapist.       SUBJECTIVE: Patient stated complaint: pain, swelling, weakness, decreased ROM and functional use    Pain Scale: 8-10/10, bilateral hands (especially L thumb proximal phalanx and distal R 3rd digit)    Easing factors: does not state  Provocative factors: does not state     Type: [x]Constant   []Intermittent  []Radiating []Localized []other:       OBJECTIVE:   Hand dominance: right    Inspection: R 3rd palmar distal phalanx laceration, R 5th palmar proximal phalanx laceration, L palmar laceration proximal to base of 4th digit (all prior lacerations noted to be uncovered without sutures; pt reports \"they kept opening\" and it appears sutures either fell out or were removed; no active bleeding or discoloration), R dorsal proximal forearm laceration with sutures    Palpation: generalized tenderness to palpation bilateral hands secondary to swelling and healing lacerations; focal tenderness at L thumb proximal phalanx and distal R 3rd digit    Posture: rounded shoulders, forward flexion    Edema: Pt reports hx L hand swelling secondary to other comorbidities; states acute swelling is limited to thumb     Date Right Left   10/5/22 MCP circumference- 20.5 cm  Wrist circumference- 18.1 cm  Thumb IPJ circumference- 7.6 cm MCP circumference- 24 cm  Wrist circumference- 21 cm  Thumb IPJ circumference- 9.6 cm            ROM WFL: []Yes [x]No (if checked, see below)     PROM AROM    L R L R   Shoulder Flexion    [facility]   Shoulder Abduction        Shoulder External Rotation        Shoulder Internal Rotation        Elbow Flexion    [facility] WFL   Elbow Extension        Pronation        Supination        Wrist Flexion    0-15 0-45   Wrist Extension    0-20 0-35   Radial Deviation        Ulnar Deviation       CMC Abduction   0-30 0-45   5th Digit MCP Extension-Flexion       4th Digit MCP Extension-Flexion       3rd Digit MCP Extension-Flexion       2nd Digit MCP Extension-Flexion       1st Digit MCP Extension-Flexion   0-45    5th Digit PIP Extension- Flexion       4th Digit PIP Extension-Flexion       3rd Digit PIP Extension-Flexion       2nd Digit PIP Extension-Flexion       1st Digit IP Extension-Flexion   0-62 Maintained in 15* flexion   5th Digit DIP Extension-Flexion       4th Digit DIP Extension-Flexion       3rd Digit DIP Extension-Flexion       2nd Digit DIP Extension-Flexion       Composite Flexion (Tip of 3rd Digit to Distal Palmar Crease (cm))   0.5 cm  1.5 cm      Joint mobility:    []Normal    [x]Hypo   []Hyper    Splinting Needs: long thumb spica with IP immobilization     Strength WFL: [x]Yes []No (if checked, see below)    Grossly 3+ to 4-/5 within available ROM    Strength (0-5) Left Right    Shoulder Flex     Shoulder Abd     Shoulder ER     Shoulder IR     Biceps     Triceps     Pronation      Supination      Wrist Flex     Wrist Ext     Wrist Radial Deviation                      Hand Assessment Left  Right  Comments    9 Hole Peg Test (s)   Will assess once lacerations fully heal    Strength (lbs)    Will assess once lacerations and fx heal    Lateral Pinch Strength (lbs)      Palmar Pinch Strength (lbs)      Tip Pinch Strength (lbs)      Opposition (Y/N) To 2nd digit Y        Assessment of UE tone/spasticity: WFL    Functional Outcome Measures:  Quick DASH: total score- 54, disability score- 97.73%    Functional Mobility/Transfers: independent with increased time     Sensation: Pt reports numbness in R 3rd fingertip distal to laceration. Perception: WFL    Coordination:  impaired secondary to limited ROM at this time; will re-assess as ROM improves     Hearing/Communication: WFL - speaks [facility] [place]; primary language is Kazakh    Cognition: appears [facility]                  [x] Patient history, allergies, meds reviewed. Medical chart reviewed. See intake form. Review Of Systems (ROS):  [x]Performed Review of systems (Integumentary, CardioPulmonary, Neurological) by intake and observation. Intake form has been scanned into medical record. Patient has been instructed to contact their primary care physician regarding ROS issues if not already being addressed at this time.       Co-morbidities/Complexities (which will affect course of rehabilitation):   []None        []Hx of COVID   Arthritic conditions   [x]Rheumatoid arthritis (M05.9)  [x]Osteoarthritis (M19.91)  []Gout   Cardiovascular conditions   [x]Hypertension (I10)  [x]Hyperlipidemia (E78.5)  []Angina pectoris (I20)  []Atherosclerosis (I70)  []Pacemaker  []Hx of CABG/stent/  cardiac surgeries  [x]CAD   Musculoskeletal conditions   [x]Disc pathology   []Congenital spine pathologies   []Osteoporosis (M81.8)  []Osteopenia (M85.8)  []Scoliosis       Endocrine conditions   []Hypothyroid (E03.9)  []Hyperthyroid  [x] Thyroid disease Gastrointestinal conditions   []Constipation (F51.91)   Metabolic conditions   []Morbid obesity (E66.01)  [x]Diabetes type 1(E10.65) or 2 (E11.65)   [x]Neuropathy (G60.9)     Cardio/Pulmonary conditions   [x]Asthma (J45)  []Coughing   []COPD (J44.9)  [x]CHF  []A-fib   Psychological Disorders  []Anxiety (F41.9)  [x]Depression (F32.9)   []Other:   Developmental Disorders  []Autism (F84.0)  []CP (G80)  []Down Syndrome (Q90.9)  []Developmental delay     Neurological conditions  [x]Prior Stroke (I69.30)  -TIA   []Parkinson's (Leonardo Emery)  []Encephalopathy (G93.40)  []MS (G35)  []Post-polio (G14)  []SCI  []TBI  []ALS Other conditions  []Fibromyalgia (M79.7)  []Vertigo  []Syncope  []Kidney Failure  [x]Cancer  -breast ca  s/p chemo, mastectomy and radiation      []currently undergoing                treatment  []Pregnancy  []Incontinence   Prior surgeries  []involved limb  []previous spinal surgery  [] section birth  []hysterectomy  []bowel / bladder surgery  []other relevant surgeries   []Other:   MARIA D, diffuse myofascial pain syndrome (10/17/2016) with hx use of cymbalta and lyrica     Hx bilateral carpal tunnel release sx, tendon release L 3rd finger       Barriers to/and or personal factors that will affect rehab potential:              []Age  []Sex    []Smoker              []Motivation/Lack of Motivation                        [x]Co-Morbidities []Cognitive Function, education/learning barriers              []Environmental, home barriers              []profession/work barriers  []past PT/medical experience  [x]other: language   Justification: Pt would benefit from skilled outpatient OT services to address pain, swelling, weakness, decreased ROM and functional use. Unlikely that L hand had full ROM or strength prior to injury due to severe edema secondary to CHF. Falls Risk Assessment (30 days):   [x] Falls risk assessed and no intervention required. [] Falls risk assessed (via clinical reasoning) and patient requires intervention due to being higher risk for falls  [] Falls education provided, including       ASSESSMENT: The pt has chief complaints of pain, swelling, weakness, decreased ROM and functional use. These symptoms as well as client factor and performance skill deficits create barriers to the patient engaging in desired occupational pursuits. Therefore, the patient is in need of skilled occupational therapy services to mitigate functional deficits in order to allow the patient to return to baseline, prevent further decline, and/or compensate for decreased functional abilities.       Functional Impairments and Activity Limitations:    []Reduced participation in functional mobility   []Reduced cognition   [x]Reduced participation in high level IADLs   [x]Reduced participation ADLs   []Reduced endurance  [x]Reduced fine motor control   [x]Reduced ROM   [x]Reduced sensation   [x]Reduced coordination  [x]Reduced strength   []Reduced balance   []Reduced posture   []Reduced safety awareness   []Reduced functional vision      Participation Restrictions  thumb spica splint with IP immobilization \"at all times,\" no hand submersion in water    Prognosis/Rehab Potential:      []Excellent   [x]Good    []Fair   []Poor    Tolerance of evaluation/treatment:    []Excellent   [x]Good    []Fair   []Poor    Occupational Therapy Evaluation Complexity Justification   A Occupational Profile/Medical and Therapy History  [] no personal factors and/or comorbidities that impact the plan of care; brief history relating to presenting problem  []1-2 personal factors and/or comorbidities that impact the plan of care; additional review of physical, cognitive, or psychosocial performance  [x]3 personal factors and/or comorbidities that impact the plan of care; extensive review of physical cognitive, psychosocial performance    Patient Assessment:  [] a total of 1-3 performance deficits relating to physical, cognitive, psychosocial limitations/restrictions  [x] a total of 3-5 performance deficits relating to physical, cognitive, psychosocial limitations/restrictions  [] a total of 5 or more performance deficits relating to physical, cognitive, psychosocial limitations/restrictions    A clinical presentation with:  [] low complexity, limited amount of treatment options no assessment modification, no co-morbidities  [x] moderate analytical complexity, detailed assessments, minimal to moderate modification of assessments, may have co-morbidities  [] high analytic complexity, comprehensive assessments, multiple treatment options, significant modifications of assessment, co-morbidities affecting performance    Clinical decision making of [] low, [x] moderate, [] high complexity using standardized patient assessment instrument and/or measurable assessment of functional outcome.     [] EVAL (LOW) 38500 (typically 15 minutes face-to-face)  [x] EVAL (MOD) 59055 (typically 30 minutes face-to-face)  [] EVAL (HIGH) 34296 (typically 45 minutes face-to-face)  [] RE-EVAL 23214    PLAN:  Frequency/Duration:  2 days per week for 6 weeks; will adjust as needed based on progress and PLOF  INTERVENTIONS:  [x] Strength training, ROM, balance training, functional mobility training  [] Neuromuscular re-education and positioning  [x] Modalities as needed that may include: E-stim, Ultrasound, Fluidotherapy, Iontophoresis as indicated, Paraffin, Hot Packs, Cold Packs  [x] Patient/caregiver education on joint protection, postural re-education, activity modification, progression of HEP. [x] Patient/caregiver education regarding home management and safety as well as ADL and IADL performance  [] Cognitive re-orientation and training  [x] Manual therapy including soft tissue mobilization, manual lymph drainage, and joint manipululations  [] Adaptive Equipment Education and Training  [x] Splinting including custom or pre-fabricated    HEP instruction: Written and verbal HEP instructions provided and reviewed: Thumb spica splint with IP immobilization- \"wear at all times\" (f/u with MD in 5 weeks)  Gentle AROM wrists and hands  R isotoner glove at night  L tensogrip D and thumb spandage     GOALS:  Patient stated goal: decrease pain  [] Progressing: [] Met: [] Not Met: [] Adjusted     Therapist goals for Patient:   Short Term Goals: To be achieved in: 30 days  1. Pt will decrease swelling and increase bilateral hand ROM to make a full fist for improved functional hand use by 11/5/22. [] Progressing: [] Met: [] Not Met: [] Adjusted   2. Pt will decrease pain and improve ROM to tolerate hand  strength test using dynamometer for improved strength by 11/5/22. [] Progressing: [] Met: [] Not Met: [] Adjusted  3. Pt will decrease pain and improve bilateral hand fine motor control to complete 9-hole peg task in less than 45 seconds without increased pain to improve coordination in ADLs by 11/5/22. [] Progressing: [] Met: [] Not Met: [] Adjusted  4. Pt will decrease pain to report ability to sleep throughout the night without waking due to hand pain to improve quality of life by 11/5/22. [] Progressing: [] Met: [] Not Met: [] Adjusted       Long Term Goals: To be achieved by discharge  1.  Pt will report a QuickDASH Symptom Severity Scale score of 80% or less indicating increased safety and functional independence in desired occupational pursuits by discharge.   [] Progressing: [] Met: [] Not Met: [] Adjusted    Electronically signed by:   RAFI Pizarro/L, C/HARLEEN ([id number])

## 2022-10-05 NOTE — TELEPHONE ENCOUNTER
Clarified with staff that the Lasix is IVP only. They are trying to get her scheduled for 10/7. Due to full schedule, Unable to give lasix on 10/6. They will contact patient/.

## 2022-10-05 NOTE — FLOWSHEET NOTE
168 [facility] Occupational Therapy  [address] [place] [address] [address], [address]  Phone: [phone]   Fax: [phone]      Occupational Therapy Daily Treatment Note  Date:  10/5/2022    Patient: Dimas Calloway   : 1970   MRN: [id number]  Referring Physician: Dr. Marnie Shah MD         Medical Diagnosis Information: H59.342W - L thumb proximal phalanx fx and multiple lacerations on bilateral hands     Date of Injury: 21, dog bites  Date of Surgery: non-op                                      Insurance information: Caresoesequiel DOMINGUEZ, no ded, no copay    Plan of care sent to provider:      [x]Faxed   []Co-signature    (attempts: 1[x] 2[] 3[])       Progress Report: []  Yes  [x]  No     Date Range for reporting period:  Beginning: 10/5/22   Ending: end of POC    Progress report due (10 Rx/or 30 days whichever is less): visit #10 or      Recertification due (POC duration/ or 90 days whichever is less): visit #12 or 23     Visit # Insurance Allowable Auth required? Date Range   Eval + 0 pending [x]  Yes  []  No pending     Latex Allergy:  [x]No      []Yes  Pacemaker:  [x] No       [] Yes     Preferred Language for Healthcare:   []English       []other: Czech    Pain level:  8-10/10, bilateral hands (especially L thumb and R third digit)     SUBJECTIVE:  See eval    Functional Disability Index:  Quick DASH: total score- 54, disability score- 97.73%    OBJECTIVE: See eval      RESTRICTIONS/PRECAUTIONS: thumb spica splint with IP immobilization \"at all times\" (f/u with MD in 6 weeks from 22)    Exercises/Interventions: Session initiated with assessment of pt status using subjective and objective measures noted in evaluation, followed by collaborative discussion regarding goals. Fabrication of thumb spica splint with IP immobilization with extensive education on donning/doffing, use at all times, and wear.  Pt educated on edema control with medium isotoner glove provided for R hand and tensogrip D and thumb spandage provided for L hand. Extensive education on gentle AROM to prevent stiffness. Pt verbalized and demonstrated understanding. Therapeutic Exercises  Resistance / level Sets/sec Reps Notes                                                                                Neuromuscular Re-ed / Therapeutic Activities                                                 Manual Intervention                                                     Modalities:     Patient education:  Eval, POC, HEP- pt verbalized understanding       Home Exercise Program:  Thumb spica splint with IP immobilization- \"wear at all times\" (f/u with MD in 5 weeks)    Therapeutic Exercise and NMR:  [x] (62714) Provided verbal/tactile cueing for activities related to strengthening, flexibility, endurance, ROM  for improvements in scapular, scapulothoracic and UE control with self care, reaching, carrying, lifting, house/yardwork, driving/computer work.    [] (32405) Provided verbal/tactile cueing for activities related to improving balance, coordination, kinesthetic sense, posture, motor skill, proprioception  to assist with  scapular, scapulothoracic and UE control with self care, reaching, carrying, lifting, house/yardwork, driving/computer work.   [] Comments:    Therapeutic Activities:    [] (33654 or 54518) Provided verbal/tactile cueing for activities related to improving balance, coordination, kinesthetic sense, posture, motor skill, proprioception and motor activation to allow for proper function of scapular, scapulothoracic and UE control with self care, carrying, lifting, driving/computer work  [] Comments:    Home Exercise Program:    [x] (82213) Reviewed/Progressed HEP activities related to strengthening, flexibility, endurance, ROM of scapular, scapulothoracic and UE control with self care, reaching, carrying, lifting, house/yardwork, driving/computer work  [] (10441) Reviewed/Progressed HEP activities related to improving balance, coordination, kinesthetic sense, posture, motor skill, proprioception of scapular, scapulothoracic and UE control with self care, reaching, carrying, lifting, house/yardwork, driving/computer work    [] Comments:    Manual Treatments:  PROM / STM / Oscillations-Mobs:  G-I, II, III, IV (PA's, Inf., Post.)  [] ([id number]) Provided manual therapy to mobilize soft tissue/joints of cervical/CT, scapular GHJ and UE for the purpose of modulating pain, promoting relaxation,  increasing ROM, reducing/eliminating soft tissue swelling/inflammation/restriction, improving soft tissue extensibility and allowing for proper ROM for normal function with self care, reaching, carrying, lifting, house/yardwork, driving/computer work  [] Comments:    ADL Training:  [] (89834) Provided self-care/home management training related to activities of daily living and compensatory training, and/or use of adaptive equipment   [] Comments:     Splinting:  [x] Fabrication of:   [] (92291) Orthotic/Prosthetic Management, subsequent encounter  [x] (18925) Orthotic management and training (fitting and assessment)  [] Comments:      Charges:  Timed Code Treatment Minutes: 45   Total Treatment Minutes: 65     [] EVAL (LOW) 22000   [] OT Re-eval (71566)  [x] EVAL (MOD) 08456   [] EVAL (HIGH) 86005       [x] Natalia (04259) x   1  [] AXKMA(62858)  [] NMR (31531) x     [] Estim (attended) (88043)   [] Manual ([id number]) x     [] US (22150)  [] TA ([facility]) x     [] Paraffin (48177)  [] ADL  ([phone]) x    [x] Splint/L code: , [id number]   [] Estim (unattended) (22 219178)  [] Fluidotherapy (75883)  [] Other:    GOALS:   Patient stated goal: decrease pain  [] Progressing: [] Met: [] Not Met: [] Adjusted     Therapist goals for Patient:   Short Term Goals: To be achieved in: 30 days  1. Pt will decrease swelling and increase bilateral hand ROM to make a full fist for improved functional hand use by 11/5/22.   [] Progressing: [] Met: [] Not Met: [] Adjusted   2. Pt will decrease pain and improve ROM to tolerate hand  strength test using dynamometer for improved strength by 11/5/22. [] Progressing: [] Met: [] Not Met: [] Adjusted  3. Pt will decrease pain and improve bilateral hand fine motor control to complete 9-hole peg task in less than 45 seconds without increased pain to improve coordination in ADLs by 11/5/22. [] Progressing: [] Met: [] Not Met: [] Adjusted  4. Pt will decrease pain to report ability to sleep throughout the night without waking due to hand pain to improve quality of life by 11/5/22. [] Progressing: [] Met: [] Not Met: [] Adjusted        Long Term Goals: To be achieved by discharge  1. Pt will report a QuickDASH Symptom Severity Scale score of 80% or less indicating increased safety and functional independence in desired occupational pursuits by discharge. [] Progressing: [] Met: [] Not Met: [] Adjusted    Progression Towards Functional goals:  [] Patient is progressing as expected towards functional goals listed. [] Progression is slowed due to complexities listed. [] Progression has been slowed due to co-morbidities.   [x] Plan just implemented, too soon to assess goals progression  [] All goals are met  [] Other:     ASSESSMENT:  See eval    Treatment/Activity Tolerance:  [x] Patient tolerated treatment well [] Patient limited by fatigue  [] Patient limited by pain  [] Patient limited by other medical complications  [] Other:     Prognosis: [x] Good [] Fair  [] Poor    Patient Requires Follow-up: [x] Yes  [] No    PLAN: See eval  [] Continue per plan of care [] Alter current plan (see comments)  [x] Plan of care initiated [] Hold pending MD visit [] Discharge    Electronically signed by: RAFI Araujo/L, C/NDT ([id number])      Note: If patient does not return for scheduled/ recommended follow up visits, this note will serve as a discharge from care along with most recent update on progress.

## 2022-10-05 NOTE — TELEPHONE ENCOUNTER
Padma Matson called to request an order for the Pt Infusion. Padma Matson wants to know if the Lasix order will be an IV Push are an Infusion. Per Padma Matson please send the correct order.   Please advise

## 2022-10-05 NOTE — CASE COMMUNICATION
Call received from Grant Rosales @ Dr. Munoz Dus office. Order for Lasix clarified; med to be given IVP.

## 2022-10-06 ENCOUNTER — HOSPITAL ENCOUNTER (OUTPATIENT)
Dept: ONCOLOGY | Age: 52
Setting detail: INFUSION SERIES
Discharge: HOME | End: 2022-10-06
Payer: COMMERCIAL

## 2022-10-06 VITALS
HEART RATE: 72 BPM | DIASTOLIC BLOOD PRESSURE: 67 MMHG | RESPIRATION RATE: 16 BRPM | SYSTOLIC BLOOD PRESSURE: 127 MMHG | TEMPERATURE: 97.1 F

## 2022-10-06 DIAGNOSIS — I50.32 CHRONIC HEART FAILURE WITH PRESERVED EJECTION FRACTION (HCC): Primary | ICD-10-CM

## 2022-10-06 DIAGNOSIS — I50.32 CHRONIC DIASTOLIC CONGESTIVE HEART FAILURE (HCC): ICD-10-CM

## 2022-10-06 PROCEDURE — 96375 TX/PRO/DX INJ NEW DRUG ADDON: CPT

## 2022-10-06 PROCEDURE — 6360000002 HC RX W HCPCS: Performed by: INTERNAL MEDICINE

## 2022-10-06 PROCEDURE — 2580000003 HC RX 258: Performed by: INTERNAL MEDICINE

## 2022-10-06 PROCEDURE — 96374 THER/PROPH/DIAG INJ IV PUSH: CPT

## 2022-10-06 PROCEDURE — 99211 OFF/OP EST MAY X REQ PHY/QHP: CPT

## 2022-10-06 RX ORDER — SODIUM CHLORIDE 0.9 % (FLUSH) 0.9 %
5-40 SYRINGE (ML) INJECTION ONCE
Status: CANCELLED
Start: 2022-10-13 | End: 2022-10-13

## 2022-10-06 RX ORDER — FUROSEMIDE 10 MG/ML
120 INJECTION INTRAMUSCULAR; INTRAVENOUS ONCE
Status: COMPLETED | OUTPATIENT
Start: 2022-10-06 | End: 2022-10-06

## 2022-10-06 RX ORDER — SODIUM CHLORIDE 0.9 % (FLUSH) 0.9 %
5-40 SYRINGE (ML) INJECTION ONCE
Status: COMPLETED | OUTPATIENT
Start: 2022-10-06 | End: 2022-10-06

## 2022-10-06 RX ORDER — FUROSEMIDE 10 MG/ML
20 INJECTION INTRAMUSCULAR; INTRAVENOUS ONCE
Status: CANCELLED | OUTPATIENT
Start: 2022-10-06 | End: 2022-10-06

## 2022-10-06 RX ORDER — FUROSEMIDE 10 MG/ML
120 INJECTION INTRAMUSCULAR; INTRAVENOUS ONCE
Status: CANCELLED
Start: 2022-10-13 | End: 2022-10-13

## 2022-10-06 RX ADMIN — SODIUM CHLORIDE, PRESERVATIVE FREE 10 ML: 5 INJECTION INTRAVENOUS at 13:57

## 2022-10-06 RX ADMIN — FUROSEMIDE 120 MG: 10 INJECTION, SOLUTION INTRAMUSCULAR; INTRAVENOUS at 13:45

## 2022-10-06 NOTE — PROGRESS NOTES
Patient to department for outpatient labs and lasix 120mg at 20 mg per minute. Tolerated well. Patient aware that she will have urinary urgency and frequency. Also aware that this tx may lower her b/p and she may feel dizzy upon standing. All questions answered.

## 2022-10-10 ENCOUNTER — HOSPITAL ENCOUNTER (OUTPATIENT)
Dept: OCCUPATIONAL THERAPY | Age: 52
Setting detail: THERAPIES SERIES
Discharge: HOME | End: 2022-10-10
Payer: COMMERCIAL

## 2022-10-10 PROCEDURE — 97110 THERAPEUTIC EXERCISES: CPT

## 2022-10-10 PROCEDURE — 97140 MANUAL THERAPY 1/> REGIONS: CPT

## 2022-10-10 NOTE — FLOWSHEET NOTE
[facility] Occupational Therapy  [address] [place] [address], [address]  Phone: [phone]   Fax: [phone]      Occupational Therapy Daily Treatment Note  Date:  10/10/2022    Patient: Lynne Vitale   : 1970   MRN: [id number]  Referring Physician: Dr. Divya Escobar MD         Medical Diagnosis Information: Y02.417J - L thumb proximal phalanx fx and multiple lacerations on bilateral hands     Date of Injury: 22, dog bites  Date of Surgery: non-op                                      Insurance information: Kateryna DOMINGUEZ, no ded, no copay    Plan of care sent to provider:      [x]Faxed   []Co-signature    (attempts: 1[x] 2[] 3[])       Progress Report: []  Yes  [x]  No     Date Range for reporting period:  Beginning: 10/5/22   Ending: end of POC    Progress report due (10 Rx/or 30 days whichever is less): visit #10 or      Recertification due (POC duration/ or 90 days whichever is less): visit #12 or 23     Visit # Insurance Allowable Auth required? Date Range   Eval +  TE- 50  Fluido- 16  Paraffin- 10  Manual- 48  TA- 24 [x]  Yes  []  No 10/5/22-22     Latex Allergy:  [x]No      []Yes  Pacemaker:  [x] No       [] Yes     Preferred Language for Healthcare:   []English       []other: Setswana    Pain level:  8-10/10, bilateral hands (especially L thumb and R third digit)     SUBJECTIVE:  Pt reports splint is working well and she is not experiencing discomfort. States wounds have healed some more and the sutures have fallen out of R dorsal forearm laceration. Setswana interpreterSmitesh Tinoco (#[id number]). Pt reports lasix injection on Thursday.      Functional Disability Index:  Quick DASH: total score- 54, disability score- 97.73%    OBJECTIVE: See eval      RESTRICTIONS/PRECAUTIONS: thumb spica splint with IP immobilization \"at all times\" (f/u with MD in 6 weeks from 22)    Exercises/Interventions: Treatment focused on edema control and AROM to prevent stiffness. MLD of LUE, followed by redressing wounds and donning tensoshape and thumb spandage for support and edema control. Bilateral completion of the following:  Tendon glides  Thumb to index and middle finger opposition (pain continues on R middle fingertip)  Finger ab/adduction   Reviewed splint instructions with pt demonstrating proper technique to don/doff. Therapeutic Exercises  Resistance / level Sets/sec Reps Notes                                                                                Neuromuscular Re-ed / Therapeutic Activities                                                 Manual Intervention                                                     Modalities:     Patient education:  Eval, POC, HEP- pt verbalized understanding       Home Exercise Program:  Thumb spica splint with IP immobilization- \"wear at all times\" (f/u with MD in 5 weeks)  Tendon glides  Finger ab/adduction  Thumb opposition    Therapeutic Exercise and NMR:  [x] (03511) Provided verbal/tactile cueing for activities related to strengthening, flexibility, endurance, ROM  for improvements in scapular, scapulothoracic and UE control with self care, reaching, carrying, lifting, house/yardwork, driving/computer work.    [] (42699) Provided verbal/tactile cueing for activities related to improving balance, coordination, kinesthetic sense, posture, motor skill, proprioception  to assist with  scapular, scapulothoracic and UE control with self care, reaching, carrying, lifting, house/yardwork, driving/computer work.   [] Comments:    Therapeutic Activities:    [] (80126 or 83059) Provided verbal/tactile cueing for activities related to improving balance, coordination, kinesthetic sense, posture, motor skill, proprioception and motor activation to allow for proper function of scapular, scapulothoracic and UE control with self care, carrying, lifting, driving/computer work  [] Comments:    Home Exercise Program:    [x] (59251) Reviewed/Progressed HEP activities related to strengthening, flexibility, endurance, ROM of scapular, scapulothoracic and UE control with self care, reaching, carrying, lifting, house/yardwork, driving/computer work  [] (01140) Reviewed/Progressed HEP activities related to improving balance, coordination, kinesthetic sense, posture, motor skill, proprioception of scapular, scapulothoracic and UE control with self care, reaching, carrying, lifting, house/yardwork, driving/computer work    [] Comments:    Manual Treatments:  PROM / STM / Oscillations-Mobs:  G-I, II, III, IV (PA's, Inf., Post.)  [x] (97124) Provided manual therapy to mobilize soft tissue/joints of cervical/CT, scapular GHJ and UE for the purpose of modulating pain, promoting relaxation,  increasing ROM, reducing/eliminating soft tissue swelling/inflammation/restriction, improving soft tissue extensibility and allowing for proper ROM for normal function with self care, reaching, carrying, lifting, house/yardwork, driving/computer work  [] Comments:    ADL Training:  [] (65324) Provided self-care/home management training related to activities of daily living and compensatory training, and/or use of adaptive equipment   [] Comments:     Splinting:  [] Fabrication of:   [] (77974) Orthotic/Prosthetic Management, subsequent encounter  [] (34625) Orthotic management and training (fitting and assessment)  [] Comments:      Charges:  Timed Code Treatment Minutes: 45   Total Treatment Minutes: 45     [] EVAL (LOW) 79111   [] OT Re-eval (83815)  [] EVAL (MOD) 47721   [] EVAL (HIGH) 17941       [x] Natalia (41783) x   2  [] BHWQX(20410)  [] NMR (94663) x     [] Estim (attended) (75274)   [x] Manual ([id number]) x  1   [] US (49606)  [] TA (03135) x     [] Paraffin (55892)  [] ADL  ([id number]) x    [] Splint/L code: , 52805   [] Estim (unattended) (22 [id number])  [] Fluidotherapy (07006)  [] Other:    GOALS:   Patient stated goal: decrease pain  [] Progressing: [] Met: [] Not Met: [] Adjusted     Therapist goals for Patient:   Short Term Goals: To be achieved in: 30 days  1. Pt will decrease swelling and increase bilateral hand ROM to make a full fist for improved functional hand use by 11/5/22. [] Progressing: [] Met: [] Not Met: [] Adjusted   2. Pt will decrease pain and improve ROM to tolerate hand  strength test using dynamometer for improved strength by 11/5/22. [] Progressing: [] Met: [] Not Met: [] Adjusted  3. Pt will decrease pain and improve bilateral hand fine motor control to complete 9-hole peg task in less than 45 seconds without increased pain to improve coordination in ADLs by 11/5/22. [] Progressing: [] Met: [] Not Met: [] Adjusted  4. Pt will decrease pain to report ability to sleep throughout the night without waking due to hand pain to improve quality of life by 11/5/22. [] Progressing: [] Met: [] Not Met: [] Adjusted        Long Term Goals: To be achieved by discharge  1. Pt will report a QuickDASH Symptom Severity Scale score of 80% or less indicating increased safety and functional independence in desired occupational pursuits by discharge. [] Progressing: [] Met: [] Not Met: [] Adjusted    Progression Towards Functional goals:  [] Patient is progressing as expected towards functional goals listed. [] Progression is slowed due to complexities listed. [] Progression has been slowed due to co-morbidities. [x] Plan just implemented, too soon to assess goals progression  [] All goals are met  [] Other:     ASSESSMENT:  Slightly improved L hand edema on this date.      Treatment/Activity Tolerance:  [x] Patient tolerated treatment well [] Patient limited by fatigue  [] Patient limited by pain  [] Patient limited by other medical complications  [] Other:     Prognosis: [x] Good [] Fair  [] Poor    Patient Requires Follow-up: [x] Yes  [] No    PLAN: See eval  [x] Continue per plan of care [] Alter current plan (see comments)  [] Plan of care initiated [] Hold pending MD visit [] Discharge    Electronically signed by: RAFI Siddiqui/KHAI SABILLON/HARLEEN ([id number])      Note: If patient does not return for scheduled/ recommended follow up visits, this note will serve as a discharge from care along with most recent update on progress.

## 2022-10-11 DIAGNOSIS — D50.9 IRON DEFICIENCY ANEMIA, UNSPECIFIED IRON DEFICIENCY ANEMIA TYPE: ICD-10-CM

## 2022-10-12 DIAGNOSIS — N28.9 RENAL INSUFFICIENCY: Chronic | ICD-10-CM

## 2022-10-12 DIAGNOSIS — I50.30 (HFPEF) HEART FAILURE WITH PRESERVED EJECTION FRACTION (HCC): Chronic | ICD-10-CM

## 2022-10-12 DIAGNOSIS — I25.83 CORONARY ARTERY DISEASE DUE TO LIPID RICH PLAQUE: Chronic | ICD-10-CM

## 2022-10-12 DIAGNOSIS — I25.10 CORONARY ARTERY DISEASE DUE TO LIPID RICH PLAQUE: Chronic | ICD-10-CM

## 2022-10-12 DIAGNOSIS — I25.10 CORONARY ARTERY DISEASE INVOLVING NATIVE CORONARY ARTERY OF NATIVE HEART WITHOUT ANGINA PECTORIS: Chronic | ICD-10-CM

## 2022-10-12 DIAGNOSIS — I42.9 CARDIOMYOPATHY, UNSPECIFIED TYPE (HCC): Chronic | ICD-10-CM

## 2022-10-12 RX ORDER — FERROUS SULFATE 325(65) MG
TABLET ORAL
Qty: 180 TABLET | Refills: 0 | Status: SHIPPED | OUTPATIENT
Start: 2022-10-12

## 2022-10-13 ENCOUNTER — HOSPITAL ENCOUNTER (OUTPATIENT)
Dept: OCCUPATIONAL THERAPY | Age: 52
Setting detail: THERAPIES SERIES
Discharge: HOME | End: 2022-10-13
Payer: COMMERCIAL

## 2022-10-13 ENCOUNTER — APPOINTMENT (OUTPATIENT)
Dept: OCCUPATIONAL THERAPY | Age: 52
End: 2022-10-13
Payer: COMMERCIAL

## 2022-10-13 ENCOUNTER — HOSPITAL ENCOUNTER (OUTPATIENT)
Dept: ONCOLOGY | Age: 52
Setting detail: INFUSION SERIES
Discharge: HOME | End: 2022-10-13
Payer: COMMERCIAL

## 2022-10-13 VITALS
DIASTOLIC BLOOD PRESSURE: 66 MMHG | HEART RATE: 74 BPM | RESPIRATION RATE: 16 BRPM | SYSTOLIC BLOOD PRESSURE: 121 MMHG | TEMPERATURE: 97.6 F

## 2022-10-13 DIAGNOSIS — I50.32 CHRONIC DIASTOLIC CONGESTIVE HEART FAILURE (HCC): ICD-10-CM

## 2022-10-13 DIAGNOSIS — I50.32 CHRONIC HEART FAILURE WITH PRESERVED EJECTION FRACTION (HCC): Primary | ICD-10-CM

## 2022-10-13 LAB
ANION GAP SERPL CALCULATED.3IONS-SCNC: 10 MMOL/L (ref 3–16)
BUN BLDV-MCNC: 71 MG/DL (ref 7–20)
CALCIUM SERPL-MCNC: 9.8 MG/DL (ref 8.3–10.6)
CHLORIDE BLD-SCNC: 89 MMOL/L (ref 99–110)
CO2: 33 MMOL/L (ref 21–32)
CREAT SERPL-MCNC: 1.4 MG/DL (ref 0.6–1.1)
GFR AFRICAN AMERICAN: 47
GFR NON-AFRICAN AMERICAN: 39
GLUCOSE BLD-MCNC: 424 MG/DL (ref 70–99)
POTASSIUM SERPL-SCNC: 4.5 MMOL/L (ref 3.5–5.1)
PRO-BNP: 190 PG/ML (ref 0–124)
SODIUM BLD-SCNC: 132 MMOL/L (ref 136–145)

## 2022-10-13 PROCEDURE — 83880 ASSAY OF NATRIURETIC PEPTIDE: CPT

## 2022-10-13 PROCEDURE — 2580000003 HC RX 258: Performed by: INTERNAL MEDICINE

## 2022-10-13 PROCEDURE — 97535 SELF CARE MNGMENT TRAINING: CPT

## 2022-10-13 PROCEDURE — 96374 THER/PROPH/DIAG INJ IV PUSH: CPT

## 2022-10-13 PROCEDURE — 6360000002 HC RX W HCPCS: Performed by: INTERNAL MEDICINE

## 2022-10-13 PROCEDURE — 97110 THERAPEUTIC EXERCISES: CPT

## 2022-10-13 PROCEDURE — 99211 OFF/OP EST MAY X REQ PHY/QHP: CPT

## 2022-10-13 PROCEDURE — 97530 THERAPEUTIC ACTIVITIES: CPT

## 2022-10-13 PROCEDURE — 80048 BASIC METABOLIC PNL TOTAL CA: CPT

## 2022-10-13 RX ORDER — SODIUM CHLORIDE 0.9 % (FLUSH) 0.9 %
5-40 SYRINGE (ML) INJECTION ONCE
Status: CANCELLED
Start: 2022-10-16 | End: 2022-10-16

## 2022-10-13 RX ORDER — FUROSEMIDE 10 MG/ML
120 INJECTION INTRAMUSCULAR; INTRAVENOUS ONCE
Status: COMPLETED | OUTPATIENT
Start: 2022-10-13 | End: 2022-10-13

## 2022-10-13 RX ORDER — SODIUM CHLORIDE 0.9 % (FLUSH) 0.9 %
5-40 SYRINGE (ML) INJECTION ONCE
Status: COMPLETED | OUTPATIENT
Start: 2022-10-13 | End: 2022-10-13

## 2022-10-13 RX ORDER — FUROSEMIDE 10 MG/ML
120 INJECTION INTRAMUSCULAR; INTRAVENOUS ONCE
Status: CANCELLED
Start: 2022-10-16 | End: 2022-10-16

## 2022-10-13 RX ADMIN — Medication 10 ML: at 14:24

## 2022-10-13 RX ADMIN — FUROSEMIDE 120 MG: 10 INJECTION, SOLUTION INTRAMUSCULAR; INTRAVENOUS at 14:24

## 2022-10-13 NOTE — FLOWSHEET NOTE
[address] [facility] Occupational Therapy  [address] [place] [address] [address], [address]  Phone: [phone]   Fax: [phone]      Occupational Therapy Daily Treatment Note  Date:  10/13/2022    Patient: Giovany Hui   : 1970   MRN: [id number]  Referring Physician: Dr. Dai Frank MD         Medical Diagnosis Information: B61.339O - L thumb proximal phalanx fx and multiple lacerations on bilateral hands     Date of Injury: 21, dog bites  Date of Surgery: non-op                                      Insurance information: Kateryna DOMINGUEZ, no ded, no copay    Plan of care sent to provider:      [x]Faxed   []Co-signature    (attempts: 1[x] 2[] 3[])       Progress Report: []  Yes  [x]  No     Date Range for reporting period:  Beginning: 10/5/22   Ending: end of POC    Progress report due (10 Rx/or 30 days whichever is less): visit #10 or 35     Recertification due (POC duration/ or 90 days whichever is less): visit #12 or 23     Visit # Insurance Allowable Auth required? Date Range   Eval + 3/12 TE- 50  Fluido- 16  Paraffin- 10  Manual- 48  TA- 24 [x]  Yes  []  No 10/5/22-22     Latex Allergy:  [x]No      []Yes  Pacemaker:  [x] No       [] Yes     Preferred Language for Healthcare:   []English       []other: Chinese    Pain level:  8-10/10, bilateral hands (especially L thumb and R third digit)     SUBJECTIVE:  Pt reports splint continues to work well. Compliant with use and wound care instructions with improved healing to all areas. Pt reports decreased R 3rd digit pain. Pt's  present on this date. Pt declined use of video , communicating primarily in [place] with some interpretation assistance from . Pt will received lasix injection today.     Functional Disability Index:  Quick DASH: total score- 54, disability score- 97.73%    OBJECTIVE: See eval      RESTRICTIONS/PRECAUTIONS: thumb spica splint with IP immobilization \"at all times\" (f/u with MD in 6 weeks from 9/30/22)    Exercises/Interventions: Treatment focused on edema control and AROM to prevent stiffness and promote circulation and wound healing. Bilateral completion of the following:  Tendon glides  Thumb opposition to each digit x8 each hand (able to touch L thumb to 4th digit after 5th rep with continued lag to 5th digit)  Finger ab/adduction, active thumb with mild discomfort with adduction    Wash cloth scrunch x5 each hand, no active thumb on L  Pt washed hands with soap and warm water with inspection of wounds following. Mild erythema noted on R dorsal forearm, but no drainage. R 3rd digit improved wound closure and coloring of fingertip. Mild maceration noted on L thumb wound; left uncovered during exercises above and dressed with non-adherent primary dressing and breathable gauze secondary dressing. Pt educated on removing thumb spandage when out of splint for exercises and allowing wounds to be exposed to air to help prevent moisture.      Therapeutic Exercises  Resistance / level Sets/sec Reps Notes                                                                                Neuromuscular Re-ed / Therapeutic Activities                                                 Manual Intervention                                                     Modalities:     Patient education:  Eval, POC, HEP, splint wear- pt verbalized understanding     10/13/22: wound care    Home Exercise Program:  Thumb spica splint with IP immobilization- \"wear at all times\" (f/u with MD in 5 weeks from [facility])  Tendon glides  Finger ab/adduction  Thumb opposition    Therapeutic Exercise and NMR:  [x] (58912) Provided verbal/tactile cueing for activities related to strengthening, flexibility, endurance, ROM  for improvements in scapular, scapulothoracic and UE control with self care, reaching, carrying, lifting, house/yardwork, driving/computer work.    [] (34384) Provided verbal/tactile cueing for activities related to improving balance, coordination, kinesthetic sense, posture, motor skill, proprioception  to assist with  scapular, scapulothoracic and UE control with self care, reaching, carrying, lifting, house/yardwork, driving/computer work.   [] Comments:    Therapeutic Activities:    [x] (82500 or 16564) Provided verbal/tactile cueing for activities related to improving balance, coordination, kinesthetic sense, posture, motor skill, proprioception and motor activation to allow for proper function of scapular, scapulothoracic and UE control with self care, carrying, lifting, driving/computer work  [] Comments:    Home Exercise Program:    [x] (24024) Reviewed/Progressed HEP activities related to strengthening, flexibility, endurance, ROM of scapular, scapulothoracic and UE control with self care, reaching, carrying, lifting, house/yardwork, driving/computer work  [] (64592) Reviewed/Progressed HEP activities related to improving balance, coordination, kinesthetic sense, posture, motor skill, proprioception of scapular, scapulothoracic and UE control with self care, reaching, carrying, lifting, house/yardwork, driving/computer work    [] Comments:    Manual Treatments:  PROM / STM / Oscillations-Mobs:  G-I, II, III, IV (PA's, Inf., Post.)  [] (70606) Provided manual therapy to mobilize soft tissue/joints of cervical/CT, scapular GHJ and UE for the purpose of modulating pain, promoting relaxation,  increasing ROM, reducing/eliminating soft tissue swelling/inflammation/restriction, improving soft tissue extensibility and allowing for proper ROM for normal function with self care, reaching, carrying, lifting, house/yardwork, driving/computer work  [] Comments:    ADL Training:  [x] (64396) Provided self-care/home management training related to activities of daily living and compensatory training, and/or use of adaptive equipment   [] Comments:     Splinting:  [] Fabrication of:   [] (93362) Orthotic/Prosthetic Management, subsequent encounter  [] (81184) Orthotic management and training (fitting and assessment)  [] Comments:      Charges:  Timed Code Treatment Minutes: 45   Total Treatment Minutes: 45     [] EVAL (LOW) 62054   [] OT Re-eval (09432)  [] EVAL (MOD) 19313   [] EVAL (HIGH) 50590       [x] Natalia (14032) x   2  [] ZZZIA(41756)  [] NMR (77905) x     [] Estim (attended) (18520)   [] Manual ([id number]) x    [] US (99360)  [x] TA (74685) x    1 [] Paraffin (50392)  [] ADL  ([phone]) x    [] Splint/L code: , 56502   [] Estim (unattended) (22 395360)  [] Fluidotherapy (80922)  [] Other:    GOALS:   Patient stated goal: decrease pain  [x] Progressing: [] Met: [] Not Met: [] Adjusted     Therapist goals for Patient:   Short Term Goals: To be achieved in: 30 days  1. Pt will decrease swelling and increase bilateral hand ROM to make a full fist for improved functional hand use by 11/5/22. [x] Progressing: [] Met: [] Not Met: [] Adjusted   2. Pt will decrease pain and improve ROM to tolerate hand  strength test using dynamometer for improved strength by 11/5/22. [x] Progressing: [] Met: [] Not Met: [] Adjusted  3. Pt will decrease pain and improve bilateral hand fine motor control to complete 9-hole peg task in less than 45 seconds without increased pain to improve coordination in ADLs by 11/5/22. [x] Progressing: [] Met: [] Not Met: [] Adjusted  4. Pt will decrease pain to report ability to sleep throughout the night without waking due to hand pain to improve quality of life by 11/5/22. [x] Progressing: [] Met: [] Not Met: [] Adjusted        Long Term Goals: To be achieved by discharge  1. Pt will report a QuickDASH Symptom Severity Scale score of 80% or less indicating increased safety and functional independence in desired occupational pursuits by discharge.   [x] Progressing: [] Met: [] Not Met: [] Adjusted    Progression Towards Functional goals:  [x] Patient is progressing as expected towards functional goals listed. [] Progression is slowed due to complexities listed. [] Progression has been slowed due to co-morbidities. [] Plan just implemented, too soon to assess goals progression  [] All goals are met  [] Other:     ASSESSMENT:  Improved L thumb opposition on this date. Improved coloring, wound healing, and tolerance to touch R 3rd fingertip. Treatment/Activity Tolerance:  [x] Patient tolerated treatment well [] Patient limited by fatigue  [] Patient limited by pain  [] Patient limited by other medical complications  [] Other:     Prognosis: [x] Good [] Fair  [] Poor    Patient Requires Follow-up: [x] Yes  [] No    PLAN: See eval  [x] Continue per plan of care [] Alter current plan (see comments)  [] Plan of care initiated [] Hold pending MD visit [] Discharge    Electronically signed by: RAFI Osullivan/L, C/NDT ([id number])      Note: If patient does not return for scheduled/ recommended follow up visits, this note will serve as a discharge from care along with most recent update on progress.

## 2022-10-13 NOTE — PROGRESS NOTES
Pt to Dept for Lasix IVP. IV Started,labs drawn. Lasix 120 mg IVP given over 6 minutes. Pt eliana with no adverse reaction noted. Denied need for printed AVS.Pt to return next week for same treatment.

## 2022-10-17 ENCOUNTER — HOSPITAL ENCOUNTER (OUTPATIENT)
Dept: OCCUPATIONAL THERAPY | Age: 52
Setting detail: THERAPIES SERIES
Discharge: HOME | End: 2022-10-17
Payer: COMMERCIAL

## 2022-10-17 PROCEDURE — 97110 THERAPEUTIC EXERCISES: CPT

## 2022-10-17 PROCEDURE — 97530 THERAPEUTIC ACTIVITIES: CPT

## 2022-10-17 RX ORDER — SIMVASTATIN 20 MG
TABLET ORAL
Qty: 90 TABLET | Refills: 0 | OUTPATIENT
Start: 2022-10-17

## 2022-10-17 NOTE — PROGRESS NOTES
[address] [facility] Occupational Therapy  [address] [place] [address], [address]  Phone: [phone]   Fax: [phone]      Occupational Therapy Daily Treatment Note  Date:  10/17/2022    Patient: Chiquis Aiken   : 1970   MRN: [id number]  Referring Physician: Dr. Nay Shah MD         Medical Diagnosis Information: G58.060W - L thumb proximal phalanx fx and multiple lacerations on bilateral hands     Date of Injury: 22, dog bites  Date of Surgery: non-op                                      Insurance information: Kateryna DOMINGUEZ, no ded, no copay    Plan of care sent to provider:      [x]Faxed   []Co-signature    (attempts: 1[x] 2[] 3[])       Progress Report: [x]  Yes  []  No     Date Range for reporting period:  Beginning: 10/5/22   Ending: end of POC    Progress report due (10 Rx/or 30 days whichever is less): visit #12 or      Recertification due (POC duration/ or 90 days whichever is less): visit #12 or 23     Visit # Insurance Allowable Auth required? Date Range   Eval +  TE- 50  Fluido- 16  Paraffin- 10  Manual- 48  TA- 24 [x]  Yes  []  No 10/5/22-22     Latex Allergy:  [x]No      []Yes  Pacemaker:  [x] No       [] Yes     Preferred Language for Healthcare:   []English       []other: Armenian    Pain level:  5/10, L thumb; 1/10 R middle finger    SUBJECTIVE:  Pt reports splint continues to work well. Decreased overall pain as above. Compliance with wound care and HEP. Pt declined use of video , communicating primarily in [address] [place] [address] with some interpretation assistance from .      Functional Disability Index:  Quick DASH: total score- 54, disability score- 97.73%        OBJECTIVE: See eval    Date Right Left   10/5/22 MCP circumference- 20.5 cm  Wrist circumference- 18.1 cm  Thumb IPJ circumference- 7.6 cm MCP circumference- 24 cm  Wrist circumference- 21 cm  Thumb IPJ circumference- 9.6 cm    10/17/22    MCP circumference- 23.9 cm  Wrist circumference- 21.8 cm  Thumb IPJ circumference- 9.2 cm      AROM     L R   Wrist Flexion  0-15 0-45   Wrist Extension  0-20 0-35   CMC Abduction 0-30 0-45   1st Digit MCP Extension-Flexion 0-45     1st Digit IP Extension-Flexion Maintained in 15* flexion    10/17/22: 5-20 0-62       Composite Flexion (Tip of 3rd Digit to Distal Palmar Crease (cm)) 0.5 cm     10/17/22: 0.25 cm 1.5 cm     10/17/22: 0 cm       Hand Assessment Left  Right  Comments    9 Hole Peg Test (s)  54  30 Will assess once lacerations fully heal    Strength (lbs)     Will assess once lacerations and fx heal    Lateral Pinch Strength (lbs)         Palmar Pinch Strength (lbs)         Tip Pinch Strength (lbs)         Opposition (Y/N) To 2nd digit  10/17/22: to 4th digit (1.25 cm lag to 5th) Y        RESTRICTIONS/PRECAUTIONS: thumb spica splint with IP immobilization \"at all times\" (f/u with MD in 6 weeks from 9/30/22)    Exercises/Interventions: Treatment focused on edema control and AROM to prevent stiffness and promote circulation and wound healing. Session initiated with assessment of pt progress with subjective and objective measures noted above, and progress with goals noted below. Bilateral completion of the following:  Tendon glides  Thumb opposition to each digit x8 each hand (lag to 5th digit on L hand)  Finger ab/adduction  Wash cloth scrunch x5 each hand, minimal active thumb on L  Pt gently grasped foam block with L hand to place toothpicks in block with R hand and remove with yellow gripper with R hand; tolerated well. No wound care needed at this time with L thumb spandage replaced.      Therapeutic Exercises  Resistance / level Sets/sec Reps Notes                                                                                Neuromuscular Re-ed / Therapeutic Activities                                                 Manual Intervention                                                     Modalities: Patient education:  Eval, POC, HEP, splint wear- pt verbalized understanding     10/13/22: wound care    Home Exercise Program:  Thumb spica splint with IP immobilization- \"wear at all times\" (f/u with MD in 5 weeks from eval)  Tendon glides  Finger ab/adduction  Thumb opposition    Therapeutic Exercise and NMR:  [x] (62483) Provided verbal/tactile cueing for activities related to strengthening, flexibility, endurance, ROM  for improvements in scapular, scapulothoracic and UE control with self care, reaching, carrying, lifting, house/yardwork, driving/computer work.    [] (25474) Provided verbal/tactile cueing for activities related to improving balance, coordination, kinesthetic sense, posture, motor skill, proprioception  to assist with  scapular, scapulothoracic and UE control with self care, reaching, carrying, lifting, house/yardwork, driving/computer work.   [] Comments:    Therapeutic Activities:    [x] (06814 or 94920) Provided verbal/tactile cueing for activities related to improving balance, coordination, kinesthetic sense, posture, motor skill, proprioception and motor activation to allow for proper function of scapular, scapulothoracic and UE control with self care, carrying, lifting, driving/computer work  [] Comments:    Home Exercise Program:    [x] (01505) Reviewed/Progressed HEP activities related to strengthening, flexibility, endurance, ROM of scapular, scapulothoracic and UE control with self care, reaching, carrying, lifting, house/yardwork, driving/computer work  [] (61837) Reviewed/Progressed HEP activities related to improving balance, coordination, kinesthetic sense, posture, motor skill, proprioception of scapular, scapulothoracic and UE control with self care, reaching, carrying, lifting, house/yardwork, driving/computer work    [] Comments:    Manual Treatments:  PROM / STM / Oscillations-Mobs:  G-I, II, III, IV (PA's, Inf., Post.)  [] (07716) Provided manual therapy to mobilize soft tissue/joints of cervical/CT, scapular GHJ and UE for the purpose of modulating pain, promoting relaxation,  increasing ROM, reducing/eliminating soft tissue swelling/inflammation/restriction, improving soft tissue extensibility and allowing for proper ROM for normal function with self care, reaching, carrying, lifting, house/yardwork, driving/computer work  [] Comments:    ADL Training:  [x] (62882) Provided self-care/home management training related to activities of daily living and compensatory training, and/or use of adaptive equipment   [] Comments:     Splinting:  [] Fabrication of:   [] (22375) Orthotic/Prosthetic Management, subsequent encounter  [] (63334) Orthotic management and training (fitting and assessment)  [] Comments:      Charges:  Timed Code Treatment Minutes: 45   Total Treatment Minutes: 45     [] EVAL (LOW) 47686   [] OT Re-eval (20300)  [] EVAL (MOD) 11851   [] EVAL (HIGH) 46198       [x] Natalia (52468) x   2  [] BAGRG(90628)  [] NMR (76949) x     [] Estim (attended) (20273)   [] Manual ([address] [facility]) x    [] US (88882)  [x] TA ([facility]) x    1 [] Paraffin (45639)  [] ADL  (69 169 24 60) x    [] Splint/L code: , 79760   [] Estim (unattended) (22 [id number])  [] Fluidotherapy (13108)  [] Other:    GOALS:   Patient stated goal: decrease pain  [x] Progressing: [] Met: [] Not Met: [] Adjusted     Therapist goals for Patient:   Short Term Goals: To be achieved in: 30 days  1. Pt will decrease swelling and increase bilateral hand ROM to make a full fist for improved functional hand use by 11/5/22. [x] Progressing: [] Met: [] Not Met: [] Adjusted   2. Pt will decrease pain and improve ROM to tolerate hand  strength test using dynamometer for improved strength by 11/5/22. [x] Progressing: [] Met: [] Not Met: [] Adjusted  3. Pt will decrease pain and improve bilateral hand fine motor control to complete 9-hole peg task in less than 45 seconds without increased pain to improve coordination in ADLs by 11/5/22.    [x] Progressing: [] Met: [] Not Met: [] Adjusted  4. Pt will decrease pain to report ability to sleep throughout the night without waking due to hand pain to improve quality of life by 11/5/22. [x] Progressing: [] Met: [] Not Met: [] Adjusted        Long Term Goals: To be achieved by discharge  1. Pt will report a QuickDASH Symptom Severity Scale score of 80% or less indicating increased safety and functional independence in desired occupational pursuits by discharge. [x] Progressing: [] Met: [] Not Met: [] Adjusted    Progression Towards Functional goals:  [x] Patient is progressing as expected towards functional goals listed. [] Progression is slowed due to complexities listed. [] Progression has been slowed due to co-morbidities. [] Plan just implemented, too soon to assess goals progression  [] All goals are met  [] Other:     ASSESSMENT:  Improved L thumb ROM on this date, as well as overall R hand ROM. Decreased L hand coordination compared to R hand due to limited ROM. Treatment/Activity Tolerance:  [x] Patient tolerated treatment well [] Patient limited by fatigue  [] Patient limited by pain  [] Patient limited by other medical complications  [] Other:     Prognosis: [x] Good [] Fair  [] Poor    Patient Requires Follow-up: [x] Yes  [] No    PLAN: See eval  [x] Continue per plan of care [] Alter current plan (see comments)  [] Plan of care initiated [] Hold pending MD visit [] Discharge    Electronically signed by: RAFI Owens/RIDGE C/HARLEEN ([id number])      Note: If patient does not return for scheduled/ recommended follow up visits, this note will serve as a discharge from care along with most recent update on progress.

## 2022-10-20 ENCOUNTER — HOSPITAL ENCOUNTER (OUTPATIENT)
Dept: OCCUPATIONAL THERAPY | Age: 52
Setting detail: THERAPIES SERIES
Discharge: HOME | End: 2022-10-20
Payer: COMMERCIAL

## 2022-10-20 ENCOUNTER — APPOINTMENT (OUTPATIENT)
Dept: OCCUPATIONAL THERAPY | Age: 52
End: 2022-10-20
Payer: COMMERCIAL

## 2022-10-20 ENCOUNTER — HOSPITAL ENCOUNTER (OUTPATIENT)
Dept: ONCOLOGY | Age: 52
Setting detail: INFUSION SERIES
Discharge: HOME | End: 2022-10-20
Payer: COMMERCIAL

## 2022-10-20 VITALS
HEART RATE: 65 BPM | TEMPERATURE: 96.9 F | RESPIRATION RATE: 16 BRPM | SYSTOLIC BLOOD PRESSURE: 106 MMHG | DIASTOLIC BLOOD PRESSURE: 57 MMHG

## 2022-10-20 DIAGNOSIS — I50.32 CHRONIC HEART FAILURE WITH PRESERVED EJECTION FRACTION (HCC): Primary | ICD-10-CM

## 2022-10-20 DIAGNOSIS — I50.32 CHRONIC DIASTOLIC CONGESTIVE HEART FAILURE (HCC): ICD-10-CM

## 2022-10-20 PROCEDURE — 99211 OFF/OP EST MAY X REQ PHY/QHP: CPT

## 2022-10-20 PROCEDURE — 97110 THERAPEUTIC EXERCISES: CPT

## 2022-10-20 PROCEDURE — 96374 THER/PROPH/DIAG INJ IV PUSH: CPT

## 2022-10-20 PROCEDURE — 2580000003 HC RX 258: Performed by: INTERNAL MEDICINE

## 2022-10-20 PROCEDURE — 6360000002 HC RX W HCPCS: Performed by: INTERNAL MEDICINE

## 2022-10-20 PROCEDURE — 97140 MANUAL THERAPY 1/> REGIONS: CPT

## 2022-10-20 RX ORDER — SODIUM CHLORIDE 0.9 % (FLUSH) 0.9 %
5-40 SYRINGE (ML) INJECTION ONCE
Status: COMPLETED | OUTPATIENT
Start: 2022-10-20 | End: 2022-10-20

## 2022-10-20 RX ORDER — FUROSEMIDE 10 MG/ML
120 INJECTION INTRAMUSCULAR; INTRAVENOUS ONCE
Status: CANCELLED
Start: 2022-10-27 | End: 2022-10-27

## 2022-10-20 RX ORDER — SODIUM CHLORIDE 0.9 % (FLUSH) 0.9 %
5-40 SYRINGE (ML) INJECTION ONCE
Status: CANCELLED
Start: 2022-10-27 | End: 2022-10-27

## 2022-10-20 RX ORDER — FUROSEMIDE 10 MG/ML
120 INJECTION INTRAMUSCULAR; INTRAVENOUS ONCE
Status: COMPLETED | OUTPATIENT
Start: 2022-10-20 | End: 2022-10-20

## 2022-10-20 RX ADMIN — FUROSEMIDE 120 MG: 10 INJECTION, SOLUTION INTRAMUSCULAR; INTRAVENOUS at 14:19

## 2022-10-20 RX ADMIN — Medication 10 ML: at 14:19

## 2022-10-20 NOTE — PROGRESS NOTES
[address] [facility] Occupational Therapy  [address] [place] [address], [address]  Phone: [phone]   Fax: [phone]      Occupational Therapy Daily Treatment Note  Date:  10/20/2022    Patient: Dayo Harrison   : 1970   MRN: [id number]  Referring Physician: Dr. Serge Polanco MD         Medical Diagnosis Information: N05.520M - L thumb proximal phalanx fx and multiple lacerations on bilateral hands     Date of Injury: 21, dog bites  Date of Surgery: non-op                                      Insurance information: Kateryna DOMINGUEZ, no ded, no copay    Plan of care sent to provider:      [x]Faxed   []Co-signature    (attempts: 1[x] 2[] 3[])       Progress Report: [x]  Yes  []  No     Date Range for reporting period:  Beginning: 10/5/22   Ending: end of POC    Progress report due (10 Rx/or 30 days whichever is less): visit #12 or      Recertification due (POC duration/ or 90 days whichever is less): visit #12 or 23     Visit # Insurance Allowable Auth required? Date Range   Eval +  TE- 50  Fluido- 16  Paraffin- 10  Manual- 48  TA- 24 [x]  Yes  []  No 10/5/22-22     Latex Allergy:  [x]No      []Yes  Pacemaker:  [x] No       [] Yes     Preferred Language for Healthcare:   []English       []other: Occitan    Pain level:  5/10, L thumb; 1/10 R middle finger    SUBJECTIVE:  Pt reports splint continues to work well. Decreased overall pain as above. Compliance with wound care and HEP. Pt declined use of video , communicating primarily in [place] with some interpretation assistance from .      Functional Disability Index:  Quick DASH: total score- 54, disability score- 97.73%        OBJECTIVE: See eval    Date Right Left   10/5/22 MCP circumference- 20.5 cm  Wrist circumference- 18.1 cm  Thumb IPJ circumference- 7.6 cm MCP circumference- 24 cm  Wrist circumference- 21 cm  Thumb IPJ circumference- 9.6 cm    10/17/22    MCP circumference- 23.9 cm  Wrist circumference- 21.8 cm  Thumb IPJ circumference- 9.2 cm      AROM     L R   Wrist Flexion  0-15 0-45   Wrist Extension  0-20 0-35   CMC Abduction 0-30 0-45   1st Digit MCP Extension-Flexion 0-45     1st Digit IP Extension-Flexion Maintained in 15* flexion    10/17/22: 5-20 0-62       Composite Flexion (Tip of 3rd Digit to Distal Palmar Crease (cm)) 0.5 cm     10/17/22: 0.25 cm 1.5 cm     10/17/22: 0 cm       Hand Assessment Left  Right  Comments    9 Hole Peg Test (s)  54  30 Will assess once lacerations fully heal    Strength (lbs)     Will assess once lacerations and fx heal    Lateral Pinch Strength (lbs)         Palmar Pinch Strength (lbs)         Tip Pinch Strength (lbs)         Opposition (Y/N) To 2nd digit  10/17/22: to 4th digit (1.25 cm lag to 5th) Y        RESTRICTIONS/PRECAUTIONS: thumb spica splint with IP immobilization \"at all times\" (f/u with MD in 6 weeks from 9/30/22)    Exercises/Interventions: Treatment focused on edema control and AROM to prevent stiffness and promote circulation and wound healing. Session initiated with assessment of pt progress with subjective assessment of progress followed by bilateral manual lymph drainage to decrease edema. Patient educated in the benefits of manual lymph drainage. Bilateral completion of the following:  - towel pushes to the the edge of table and back for facilitation of isometric contraction both ues following MLD  Tendon glides  Thumb opposition to each digit x8 each hand (lag to 5th digit on L hand)  Finger ab/adduction  Wash cloth scrunch x5 each hand, minimal active thumb on L  Completed in hand translation of foam squares underneath towel for stimulation of lymphatics while completing task  Therapist assisted patient with gentle blocking and arom of pipj on left thumb times ten reps.         Therapeutic Exercises  Resistance / level Sets/sec Reps Notes house/yardwork, driving/computer work    [] Comments:    Manual Treatments:  PROM / STM / Oscillations-Mobs:  G-I, II, III, IV (PA's, Inf., Post.)  [] (21310) Provided manual therapy to mobilize soft tissue/joints of cervical/CT, scapular GHJ and UE for the purpose of modulating pain, promoting relaxation,  increasing ROM, reducing/eliminating soft tissue swelling/inflammation/restriction, improving soft tissue extensibility and allowing for proper ROM for normal function with self care, reaching, carrying, lifting, house/yardwork, driving/computer work  [] Comments:    ADL Training:  [x] (76127) Provided self-care/home management training related to activities of daily living and compensatory training, and/or use of adaptive equipment   [] Comments:     Splinting:  [] Fabrication of:   [] (79272) Orthotic/Prosthetic Management, subsequent encounter  [] (83303) Orthotic management and training (fitting and assessment)  [] Comments:      Charges:  Timed Code Treatment Minutes: 45   Total Treatment Minutes: 45     [] EVAL (LOW) 29280   [] OT Re-eval (33504)  [] EVAL (MOD) 55097   [] EVAL (HIGH) 01346       [x] Natalia (60992) x   2  [] FOGAL(83244)  [] NMR (24098) x     [] Estim (attended) (67366)   [] Manual ([id number]) x    [] US (21639)  [x] TA ([id number]) x    1 [] Paraffin (19517)  [] ADL  (88 649 24 60) x    [] Splint/L code: , 32040   [] Estim (unattended) [phone]  [] Fluidotherapy (38522)  [] Other:    GOALS:   Patient stated goal: decrease pain  [x] Progressing: [] Met: [] Not Met: [] Adjusted     Therapist goals for Patient:   Short Term Goals: To be achieved in: 30 days  1. Pt will decrease swelling and increase bilateral hand ROM to make a full fist for improved functional hand use by 11/5/22. [x] Progressing: [] Met: [] Not Met: [] Adjusted   2. Pt will decrease pain and improve ROM to tolerate hand  strength test using dynamometer for improved strength by 11/5/22.    [x] Progressing: [] Met: [] Not Met: [] Adjusted  3. Pt will decrease pain and improve bilateral hand fine motor control to complete 9-hole peg task in less than 45 seconds without increased pain to improve coordination in ADLs by 11/5/22. [x] Progressing: [] Met: [] Not Met: [] Adjusted  4. Pt will decrease pain to report ability to sleep throughout the night without waking due to hand pain to improve quality of life by 11/5/22. [x] Progressing: [] Met: [] Not Met: [] Adjusted        Long Term Goals: To be achieved by discharge  1. Pt will report a QuickDASH Symptom Severity Scale score of 80% or less indicating increased safety and functional independence in desired occupational pursuits by discharge. [x] Progressing: [] Met: [] Not Met: [] Adjusted    Progression Towards Functional goals:  [x] Patient is progressing as expected towards functional goals listed. [] Progression is slowed due to complexities listed. [] Progression has been slowed due to co-morbidities. [] Plan just implemented, too soon to assess goals progression  [] All goals are met  [] Other:     ASSESSMENT:  Improved L thumb ROM on this date, as well as overall R hand ROM. Decreased L hand coordination compared to R hand due to limited ROM. Treatment/Activity Tolerance:  [x] Patient tolerated treatment well [] Patient limited by fatigue  [] Patient limited by pain  [] Patient limited by other medical complications  [] Other:     Prognosis: [x] Good [] Fair  [] Poor    Patient Requires Follow-up: [x] Yes  [] No    PLAN: See eval  [x] Continue per plan of care [] Alter current plan (see comments)  [] Plan of care initiated [] Hold pending MD visit [] Discharge    Electronically signed by:            Note: If patient does not return for scheduled/ recommended follow up visits, this note will serve as a discharge from care along with most recent update on progress.

## 2022-10-20 NOTE — PROGRESS NOTES
Pt to Dept for Lasix IVP. IV Started, Lasix 120 mg IVP given over 6 minutes. Pt eliana with no adverse reaction noted. Denied need for printed AVS.Pt to return next week for same treatment.

## 2022-10-24 ENCOUNTER — HOSPITAL ENCOUNTER (OUTPATIENT)
Dept: OCCUPATIONAL THERAPY | Age: 52
Setting detail: THERAPIES SERIES
Discharge: HOME | End: 2022-10-24
Payer: COMMERCIAL

## 2022-10-24 PROCEDURE — 97530 THERAPEUTIC ACTIVITIES: CPT

## 2022-10-24 PROCEDURE — 97110 THERAPEUTIC EXERCISES: CPT

## 2022-10-24 PROCEDURE — 97140 MANUAL THERAPY 1/> REGIONS: CPT

## 2022-10-24 NOTE — FLOWSHEET NOTE
[facility]  [facility] [place], [address] [place] [address]  Phone: [phone]   Fax: [phone]      Occupational Therapy Daily Treatment Note  Date:  10/24/2022    Patient: Alison Jenkins   : 1970   MRN: [id number]  Referring Physician: Dr. Ghada Mcmahan MD         Medical Diagnosis Information: N02.510D - L thumb proximal phalanx fx and multiple lacerations on bilateral hands     Date of Injury: 21, dog bites  Date of Surgery: non-op                                      Insurance information: Kateryna DOMINGUEZ, no ded, no copay    Plan of care sent to provider:      [x]Faxed   []Co-signature    (attempts: 1[x] 2[] 3[])       Progress Report: []  Yes  [x]  No     Date Range for reporting period:  Beginning: 10/5/22   Ending: end of POC    Progress report due (10 Rx/or 30 days whichever is less): visit #12 or      Recertification due (POC duration/ or 90 days whichever is less): visit #12 or 23     Visit # Insurance Allowable Auth required? Date Range   Eval +  TE- 48  Fluido- 16  Paraffin- 10  Manual- 48  TA- 24 [x]  Yes  []  No 10/5/22-22     Latex Allergy:  [x]No      []Yes  Pacemaker:  [x] No       [] Yes     Preferred Language for Healthcare:   []English       []other: Icelandic    Pain level:  5/10, L thumb; 1/10 R middle finger    SUBJECTIVE:  Pt reports she has a pneumatic pump at home, but has not used since her injury. Pt declined use of video , communicating primarily in [place] with some interpretation assistance from .      Functional Disability Index:  Quick DASH: total score- 54, disability score- 97.73%        OBJECTIVE: See eval    Date Right Left   10/5/22 MCP circumference- 20.5 cm  Wrist circumference- 18.1 cm  Thumb IPJ circumference- 7.6 cm MCP circumference- 24 cm  Wrist circumference- 21 cm  Thumb IPJ circumference- 9.6 cm    10/17/22    MCP circumference- 23.9 cm  Wrist circumference- 21.8 cm  Thumb IPJ circumference- 9.2 cm      AROM     L R   Wrist Flexion  0-15 0-45   Wrist Extension  0-20 0-35   CMC Abduction 0-30 0-45   1st Digit MCP Extension-Flexion 0-45     1st Digit IP Extension-Flexion Maintained in 15* flexion    10/17/22: 5-20 0-62       Composite Flexion (Tip of 3rd Digit to Distal Palmar Crease (cm)) 0.5 cm     10/17/22: 0.25 cm 1.5 cm     10/17/22: 0 cm       Hand Assessment Left  Right  Comments    9 Hole Peg Test (s)  54  30 Will assess once lacerations fully heal    Strength (lbs)     Will assess once lacerations and fx heal    Lateral Pinch Strength (lbs)         Palmar Pinch Strength (lbs)         Tip Pinch Strength (lbs)         Opposition (Y/N) To 2nd digit  10/17/22: to 4th digit (1.25 cm lag to 5th) Y        RESTRICTIONS/PRECAUTIONS: thumb spica splint with IP immobilization \"at all times\" (f/u with MD in 6 weeks from 9/30/22)    Exercises/Interventions: Treatment focused on edema control and AROM to prevent stiffness and promote circulation and wound healing. Session initiated with PASCUAL HU for edema control, followed by trialing pneumatic pump. Extensive education on edema control and home pneumatic pump with pt to resume use. Provided with large isotoner glove and full arm tensoshape.  Pt completed the following:   Tendon glides  Thumb opposition to each digit x8 each hand (lag to 5th digit on L hand)  Finger ab/adduction  R hand blue foam- gross grasp, tip pinch, lateral pinch x8 each        Therapeutic Exercises  Resistance / level Sets/sec Reps Notes                                                                                Neuromuscular Re-ed / Therapeutic Activities                                                 Manual Intervention                                                     Modalities:     Patient education:  Eval, POC, HEP, splint wear- pt verbalized understanding     10/13/22: wound care    Home Exercise Program:  Thumb spica splint with IP immobilization- \"wear at all times\" (f/u with MD in 5 weeks from [facility])  Tendon glides  Finger ab/adduction  Thumb opposition    Therapeutic Exercise and NMR:  [x] (01239) Provided verbal/tactile cueing for activities related to strengthening, flexibility, endurance, ROM  for improvements in scapular, scapulothoracic and UE control with self care, reaching, carrying, lifting, house/yardwork, driving/computer work.    [] (31203) Provided verbal/tactile cueing for activities related to improving balance, coordination, kinesthetic sense, posture, motor skill, proprioception  to assist with  scapular, scapulothoracic and UE control with self care, reaching, carrying, lifting, house/yardwork, driving/computer work.   [] Comments:    Therapeutic Activities:    [x] (81361 or 76975) Provided verbal/tactile cueing for activities related to improving balance, coordination, kinesthetic sense, posture, motor skill, proprioception and motor activation to allow for proper function of scapular, scapulothoracic and UE control with self care, carrying, lifting, driving/computer work  [] Comments:    Home Exercise Program:    [x] (71984) Reviewed/Progressed HEP activities related to strengthening, flexibility, endurance, ROM of scapular, scapulothoracic and UE control with self care, reaching, carrying, lifting, house/yardwork, driving/computer work  [] (33084) Reviewed/Progressed HEP activities related to improving balance, coordination, kinesthetic sense, posture, motor skill, proprioception of scapular, scapulothoracic and UE control with self care, reaching, carrying, lifting, house/yardwork, driving/computer work    [] Comments:    Manual Treatments:  PROM / STM / Oscillations-Mobs:  G-I, II, III, IV (PA's, Inf., Post.)  [x] (85339) Provided manual therapy to mobilize soft tissue/joints of cervical/CT, scapular GHJ and UE for the purpose of modulating pain, promoting relaxation,  increasing ROM, reducing/eliminating soft tissue swelling/inflammation/restriction, improving soft tissue extensibility and allowing for proper ROM for normal function with self care, reaching, carrying, lifting, house/yardwork, driving/computer work  [] Comments:    ADL Training:  [x] (97363) Provided self-care/home management training related to activities of daily living and compensatory training, and/or use of adaptive equipment   [] Comments:     Splinting:  [] Fabrication of:   [] (69723) Orthotic/Prosthetic Management, subsequent encounter  [] (53808) Orthotic management and training (fitting and assessment)  [] Comments:      Charges:  Timed Code Treatment Minutes: 65   Total Treatment Minutes: 65     [] EVAL (LOW) 81661   [] OT Re-eval (03240)  [] EVAL (MOD) 80567   [] EVAL (HIGH) [phone]       [x] Natalia (21022) x   2  [] QBAXR(91472)  [] NMR (78993) x     [] Estim (attended) (82876)   [x] Manual ([id number]) x   1 [] US (20696)  [x] TA () x    1 [] Paraffin (63790)  [] ADL  ([phone]) x    [] Splint/L code: , 39862   [] Estim (unattended) ([id number])  [] Fluidotherapy (19675)  [] Other:    GOALS:   Patient stated goal: decrease pain  [x] Progressing: [] Met: [] Not Met: [] Adjusted     Therapist goals for Patient:   Short Term Goals: To be achieved in: 30 days  1. Pt will decrease swelling and increase bilateral hand ROM to make a full fist for improved functional hand use by 11/5/22. [x] Progressing: [] Met: [] Not Met: [] Adjusted   2. Pt will decrease pain and improve ROM to tolerate hand  strength test using dynamometer for improved strength by 11/5/22. [x] Progressing: [] Met: [] Not Met: [] Adjusted  3. Pt will decrease pain and improve bilateral hand fine motor control to complete 9-hole peg task in less than 45 seconds without increased pain to improve coordination in ADLs by 11/5/22. [x] Progressing: [] Met: [] Not Met: [] Adjusted  4.  Pt will decrease pain to report ability to sleep throughout the night without waking due to hand pain to improve quality of life by 11/5/22. [x] Progressing: [] Met: [] Not Met: [] Adjusted        Long Term Goals: To be achieved by discharge  1. Pt will report a QuickDASH Symptom Severity Scale score of 80% or less indicating increased safety and functional independence in desired occupational pursuits by discharge. [x] Progressing: [] Met: [] Not Met: [] Adjusted    Progression Towards Functional goals:  [x] Patient is progressing as expected towards functional goals listed. [] Progression is slowed due to complexities listed. [] Progression has been slowed due to co-morbidities. [] Plan just implemented, too soon to assess goals progression  [] All goals are met  [] Other:     ASSESSMENT:  Good tolerance of pneumatic pump trial with decreased swelling noted following. Continue L hand edema control and gentle ROM with R hand strengthening. Treatment/Activity Tolerance:  [x] Patient tolerated treatment well [] Patient limited by fatigue  [] Patient limited by pain  [] Patient limited by other medical complications  [] Other:     Prognosis: [x] Good [] Fair  [] Poor    Patient Requires Follow-up: [x] Yes  [] No    PLAN: See eval  [x] Continue per plan of care [] Alter current plan (see comments)  [] Plan of care initiated [] Hold pending MD visit [] Discharge    Electronically signed by:     RAFI Downing/L, C/NDT ([id number])         Note: If patient does not return for scheduled/ recommended follow up visits, this note will serve as a discharge from care along with most recent update on progress.

## 2022-10-25 DIAGNOSIS — K44.9 HIATAL HERNIA WITH GERD: ICD-10-CM

## 2022-10-25 DIAGNOSIS — K21.9 HIATAL HERNIA WITH GERD: ICD-10-CM

## 2022-10-26 ENCOUNTER — OFFICE VISIT (OUTPATIENT)
Dept: INTERNAL MEDICINE CLINIC | Age: 52
End: 2022-10-26
Payer: COMMERCIAL

## 2022-10-26 ENCOUNTER — APPOINTMENT (OUTPATIENT)
Dept: OCCUPATIONAL THERAPY | Age: 52
End: 2022-10-26
Payer: COMMERCIAL

## 2022-10-26 VITALS
SYSTOLIC BLOOD PRESSURE: 122 MMHG | BODY MASS INDEX: 35.02 KG/M2 | HEART RATE: 71 BPM | WEIGHT: 204 LBS | DIASTOLIC BLOOD PRESSURE: 76 MMHG | OXYGEN SATURATION: 94 %

## 2022-10-26 DIAGNOSIS — R51.9 CHRONIC DAILY HEADACHE: ICD-10-CM

## 2022-10-26 DIAGNOSIS — I50.32 CHRONIC HEART FAILURE WITH PRESERVED EJECTION FRACTION (HCC): Chronic | ICD-10-CM
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
## 2022-10-26 DIAGNOSIS — Z23 NEED FOR INFLUENZA VACCINATION: Primary | ICD-10-CM

## 2022-10-26 DIAGNOSIS — R25.1 TREMOR: ICD-10-CM

## 2022-10-26 DIAGNOSIS — R42 VERTIGO: ICD-10-CM

## 2022-10-26 PROCEDURE — G8482 FLU IMMUNIZE ORDER/ADMIN: HCPCS | Performed by: INTERNAL MEDICINE

## 2022-10-26 PROCEDURE — G8427 DOCREV CUR MEDS BY ELIG CLIN: HCPCS | Performed by: INTERNAL MEDICINE

## 2022-10-26 PROCEDURE — 3017F COLORECTAL CA SCREEN DOC REV: CPT | Performed by: INTERNAL MEDICINE

## 2022-10-26 PROCEDURE — 3078F DIAST BP <80 MM HG: CPT | Performed by: INTERNAL MEDICINE

## 2022-10-26 PROCEDURE — G8417 CALC BMI ABV UP PARAM F/U: HCPCS | Performed by: INTERNAL MEDICINE

## 2022-10-26 PROCEDURE — 1036F TOBACCO NON-USER: CPT | Performed by: INTERNAL MEDICINE

## 2022-10-26 PROCEDURE — 90674 CCIIV4 VAC NO PRSV 0.5 ML IM: CPT | Performed by: INTERNAL MEDICINE

## 2022-10-26 PROCEDURE — 99214 OFFICE O/P EST MOD 30 MIN: CPT | Performed by: INTERNAL MEDICINE

## 2022-10-26 PROCEDURE — 3074F SYST BP LT 130 MM HG: CPT | Performed by: INTERNAL MEDICINE

## 2022-10-26 PROCEDURE — 90471 IMMUNIZATION ADMIN: CPT | Performed by: INTERNAL MEDICINE

## 2022-10-26 RX ORDER — OMEPRAZOLE 20 MG/1
CAPSULE, DELAYED RELEASE ORAL
Qty: 60 CAPSULE | Refills: 5 | Status: SHIPPED | OUTPATIENT
Start: 2022-10-26

## 2022-10-26 SDOH — ECONOMIC STABILITY: FOOD INSECURITY: WITHIN THE PAST 12 MONTHS, YOU WORRIED THAT YOUR FOOD WOULD RUN OUT BEFORE YOU GOT MONEY TO BUY MORE.: NEVER TRUE

## 2022-10-26 SDOH — ECONOMIC STABILITY: FOOD INSECURITY: WITHIN THE PAST 12 MONTHS, THE FOOD YOU BOUGHT JUST DIDN'T LAST AND YOU DIDN'T HAVE MONEY TO GET MORE.: NEVER TRUE

## 2022-10-26 ASSESSMENT — SOCIAL DETERMINANTS OF HEALTH (SDOH): HOW HARD IS IT FOR YOU TO PAY FOR THE VERY BASICS LIKE FOOD, HOUSING, MEDICAL CARE, AND HEATING?: NOT HARD AT ALL

## 2022-10-26 NOTE — PROGRESS NOTES
Patient: Meme Shields is a 47 y.o. female who presents today with the following Chief Complaint(s):  Chief Complaint   Patient presents with    Follow-up       HPI    Here today for follow up. Patient's primary language is Lithuanian. Interview is conducted with her . Dr. Zabrina Rausch has her on a fluid restriction- 2 cups/day. Is going to infusion center weekly for IV Lasix to keep fluid balance. Does get scared about her heart condition. Is scared of being fluid overloaded and being unable to breathe. Did have 2 VV with neurology and did not get through with neurology. Considering changing neurologists. Headaches are about the same.  will reach out and see if they can come in for an in-person appointment. Sometimes she feels unsteady and notices that her hands start shaking. When her hands start shaking she will drop whatever she is shaking. Meclizine helps a little when unsteady. Was bitten by a dog in September. Sustained multiple lacerations and had left proximal phalanx fx. Has been going to OT. ED note reviewed.     Component Ref Range & Units 10/13/22 1420 9/28/22 1553 8/22/22 1525 8/22/22 1028 6/30/22 1306 4/26/22 1136 4/19/22 1820   Pro-BNP 0 - 124 pg/mL 190 High   369 High  CM  222 High  CM  192 High  CM  188        10/13/22 1420 9/28/22 1553 8/22/22 1525 8/22/22 1028 6/30/22 1306 6/14/22 1508 4/26/22 1136     Sodium 136 - 145 mmol/L 132 Low   135 Low   137  136  136   139    Potassium 3.5 - 5.1 mmol/L 4.5  4.6  4.8  4.2  4.9   4.4    Chloride 99 - 110 mmol/L 89 Low   90 Low   93 Low   92 Low   93 Low    98 Low     CO2 21 - 32 mmol/L 33 High   32  33 High   32  30   29    Anion Gap 3 - 16 10  13  11  12  13   12    Glucose 70 - 99 mg/dL 424 High   193 High   199 High   309 High   231 High    334 High     BUN 7 - 20 mg/dL 71 High   44 High   39 High   41 High   35 High    46 High     Creatinine 0.6 - 1.1 mg/dL 1.4 High   1.5 High   1.5 High   1.6 High   1.7 High   1.3 High 1.8 High     GFR Non- >60 39 Abnormal   36 Abnormal  CM  36 Abnormal  CM  34 Abnormal  CM           Allergies   Allergen Reactions    Iv Dye [Iodides] Anaphylaxis     allergic to ct scan dye, not the MRI    Diazepam Other (See Comments)    Insulin Glargine Other (See Comments)     High blood sugar     Trazodone And Nefazodone Other (See Comments)     Makes patient feel very sluggish      Past Medical History:   Diagnosis Date    Anxiety     Anxiety and depression     Arthritis     not sure of specific type    Asthma     CAD (coronary artery disease)     Cancer ([facility] [address] 2007    left breast    Cerebral artery occlusion with cerebral infarction ([facility] [address]     2000, 2001    CHF (congestive heart failure) ([facility] [address] 2018    Chronic kidney disease     renal insufficency/to see Dr Audrey Gorman    Chronic pain     Constipation     COPD (chronic obstructive pulmonary disease) ([facility] [address]     Depression     Diabetes mellitus ([facility] [address]     Diabetic polyneuropathy associated with type 2 diabetes mellitus ([facility] [address] 2/2/2018    Dysthymia 2/2/2018    ESBL (extended spectrum beta-lactamase) producing bacteria infection 03/14/2018    urine    Fibromyalgia     Gastric ulcer, unspecified as acute or chronic, without mention of hemorrhage, perforation, or obstruction     GERD (gastroesophageal reflux disease)     Gout     HIGH CHOLESTEROL     Hypertension     Hypothyroidism     Pneumonia 3/25/2020    Pyelonephritis     Severe persistent asthma without complication 6/9/4838    Thyroid disease     TIA (transient ischemic attack)     with occasional left leg and hand weakness      Past Surgical History:   Procedure Laterality Date    BREAST SURGERY      left mastectomy    CARPAL TUNNEL RELEASE      Bilateral    FINGER CONTRACTURE SURGERY      HYSTERECTOMY (CERVIX STATUS UNKNOWN)  2005    USO    MASTECTOMY      TONSILLECTOMY      UPPER GASTROINTESTINAL ENDOSCOPY  2019    stretched esophagous       Social History     Socioeconomic History Marital status:      Spouse name: Angie Griggs    Number of children: 3    Years of education: Not on file    Highest education level: Not on file   Occupational History    Occupation: disabled   Tobacco Use    Smoking status: Never    Smokeless tobacco: Never   Vaping Use    Vaping Use: Never used   Substance and Sexual Activity    Alcohol use: No    Drug use: No    Sexual activity: Not Currently   Other Topics Concern    Not on file   Social History Narrative    Not on file     Social Determinants of Health     Financial Resource Strain: Low Risk     Difficulty of Paying Living Expenses: Not hard at all   Food Insecurity: No Food Insecurity    Worried About Running Out of Food in the Last Year: Never true    Ran Out of Food in the Last Year: Never true   Transportation Needs: Not on file   Physical Activity: Not on file   Stress: Not on file   Social Connections: Not on file   Intimate Partner Violence: Not on file   Housing Stability: Not on file     Family History   Problem Relation Age of Onset    Asthma Other     Cancer Other     Depression Other     Diabetes Other     Hypertension Other     High Cholesterol Other     Migraines Other     Heart Attack Father 72         of MI    High Blood Pressure Mother     Diabetes Mother         Outpatient Medications Prior to Visit   Medication Sig Dispense Refill    omeprazole (PRILOSEC) 20 MG delayed release capsule TAKE 1 CAPSULE BY MOUTH TWO TIMES A DAY 60 capsule 5    FEROSUL 325 (65 Fe) MG tablet TAKE 1 TABLET BY MOUTH TWO TIMES A DAY WITH MEALS 180 tablet 0    metoprolol tartrate (LOPRESSOR) 25 MG tablet TAKE 1 TABLET BY MOUTH TWO TIMES A  tablet 3    Febuxostat 80 MG TABS TAKE 1 TABLET BY MOUTH EVERY DAY 90 tablet 1    leflunomide (ARAVA) 20 MG tablet TAKE 1 TABLET BY MOUTH EVERY DAY 90 tablet 0    sulfaSALAzine (AZULFIDINE) 500 MG tablet Take 1 tablet by mouth two times daily 180 tablet 0    SM SENNA-S 8.6-50 MG per tablet TAKE 2 TABLETS BY MOUTH TWO TIMES A  tablet 5    torsemide (DEMADEX) 100 MG tablet TAKE 1 TABLET BY MOUTH IN THE MORNING AND 1/2 TABLET IN THE EVENING. 135 tablet 0    spironolactone (ALDACTONE) 50 MG tablet TAKE 2 TABLETS BY MOUTH IN THE MORNING and 1 tablet in the evening 270 tablet 0    solifenacin (VESICARE) 10 MG tablet Take 1 tablet by mouth daily 90 tablet 3    meclizine (ANTIVERT) 25 MG tablet Take 1 tablet by mouth 3 times daily as needed for Dizziness 30 tablet 2    montelukast (SINGULAIR) 10 MG tablet Take 1 tablet by mouth nightly 30 tablet 5    TRESIBA FLEXTOUCH 200 UNIT/ML SOPN INJECT 200 UNITS SUBCUTANEOUSLY ONE TIME DAILY 54 mL 3    lubiprostone (AMITIZA) 24 MCG capsule TAKE 1 CAPSULE BY MOUTH TWO TIMES A DAY WITH MEALS 60 capsule 5    insulin aspart (NOVOLOG FLEXPEN) 100 UNIT/ML injection pen inject 30 to 50 units into the skin three time daily before meals 60 mL 3    levothyroxine (SYNTHROID) 150 MCG tablet TAKE 1 TABLET BY MOUTH IN THE MORNING before breakfast 90 tablet 1    vitamin D3 (CHOLECALCIFEROL) 25 MCG (1000 UT) TABS tablet TAKE 3 TABLETS BY MOUTH ONE TIME A DAY 90 tablet 5    STEGLATRO 5 MG TABS TAKE 1 TABLET BY MOUTH EVERY DAY 90 tablet 1    ranolazine (RANEXA) 500 MG extended release tablet TAKE 1 TABLET BY MOUTH TWO TIMES A  tablet 3    Urea 40 % LOTN Apply to feet nightly and cover with Aquaphor 325 mL 5    metOLazone (ZAROXOLYN) 2.5 MG tablet TAKE 1 TABLET BY MOUTH TWO TIMES A WEEK AS NEEDED FOR WEIGHT OVER 180 POUNDS.  DO NOT TAKE 2 DAYS IN A ROW. (Patient taking differently: Take 2.5 mg by mouth twice a week) 24 tablet 0    albuterol (PROVENTIL) (2.5 MG/3ML) 0.083% nebulizer solution Take 3 mLs by nebulization every 6 hours as needed for Wheezing or Shortness of Breath 120 each 1    polyethylene glycol (GLYCOLAX) 17 GM/SCOOP powder TAKE 17 GRAMS (1 CAPFUL) BY MOUTH NIGHTLY 510 g 0    LINZESS 290 MCG CAPS capsule TAKE 1 CAPSULE BY MOUTH ONE TIME A DAY FOR 90 DAYS      REXULTI 2 MG TABS tablet Take 2 mg by mouth at bedtime       methocarbamol (ROBAXIN-750) 750 MG tablet Take 1 tablet by mouth 3 times daily as needed (pain and spasm) 30 tablet 0    nystatin (MYCOSTATIN) 627345 UNIT/ML suspension Take 5 mLs by mouth 4 times daily 500 mL 1    traZODone (DESYREL) 50 MG tablet Take 1 tablet by mouth nightly Take it on the day of sleep study 1 tablet 0    loratadine (CLARITIN) 10 MG tablet TAKE 1 TABLET BY MOUTH ONE TIME A DAY  30 tablet 5    OXYGEN Inhale 2 L into the lungs nightly Sometimes with activity      oxyCODONE (OXYCONTIN) 20 MG extended release tablet Take 20 mg by mouth every 12 hours. aspirin 81 MG EC tablet Take 81 mg by mouth daily      benztropine (COGENTIN) 1 MG tablet Take 1 mg by mouth nightly       oxyCODONE-acetaminophen (PERCOCET)  MG per tablet Take 1 tablet by mouth every 4 hours as needed for Pain . Earliest Fill Date: 6/8/17 100 tablet 0    duloxetine (CYMBALTA) 60 MG capsule Take 60 mg by mouth 2 times daily. nitroGLYCERIN (NITROSTAT) 0.4 MG SL tablet PLACE ONE TABLET UNDER TONGUE AS NEEDED FOR CHEST PAIN, MAY REPEAT EVERY 5 MINUTES AS NEEDED UP TO A MAX OF 3 TABLETS. IF NO RELIEF AFTER 1 DOSE, CALL 911. 25 tablet 2    pregabalin (LYRICA) 75 MG capsule TAKE 1 CAPSULE BY MOUTH TWO TIMES A DAY 60 capsule 2    Continuous Blood Gluc Sensor (FREESTYLE EMERALD 2 SENSOR) MISC Change every 14 days 2 each 5    Insulin Pen Needle (B-D UF III MINI PEN NEEDLES) 31G X 5 MM MISC use five times daily with insulin 200 each 5    Blood Glucose Monitoring Suppl (ONETOUCH VERIO) w/Device KIT Use to check glucose 4 times daily.  1 kit 0    Lidocaine 5 % CREA Apply to feet QID prn pain for peripheral neuropathy 30 g 5    butalbital-acetaminophen-caffeine (FIORICET, ESGIC) -40 MG per tablet Take 1 tablet by mouth every 6 hours as needed for Headaches 30 tablet 1    rosuvastatin (CRESTOR) 10 MG tablet Take 1 tablet by mouth nightly (Patient not taking: Reported on 10/3/2022) 90 tablet 3 mineral oil-hydrophilic petrolatum (HYDROPHOR) ointment Apply topically as needed to hands and feet. 454 g 5    Compression Stockings MISC by Does not apply route Zippered stockings for daily use on lower extremities (do not wear at night). 20-30mmHg. (Patient not taking: Reported on 9/28/2022) 1 each 1    ONETOUCH VERIO strip use to test blood sugar 5 times daily 200 strip 5    Continuous Blood Gluc  (FREESTYLE EMERALD 2 READER) MINDY To check glucose levels 1 each 0    ketoconazole (NIZORAL) 2 % shampoo Apply topically daily as needed. (Patient not taking: Reported on 9/28/2022) 120 mL 1    diazePAM (VALIUM) 5 MG tablet Take 5 mg by mouth 2 times daily as needed for Anxiety. No facility-administered medications prior to visit. Patient'spast medical history, surgical history, family history, medications,  and allergies  were all reviewed and updated as appropriate today. Review of Systems   Constitutional:  Negative for appetite change, fatigue and fever. Respiratory:  Negative for chest tightness and shortness of breath. Cardiovascular:  Negative for chest pain. Gastrointestinal:  Negative for constipation and diarrhea. Skin:  Negative for rash. /76 (Site: Right Upper Arm, Position: Sitting, Cuff Size: Medium Adult)   Pulse 71   Wt 204 lb (92.5 kg)   SpO2 94%   BMI 35.02 kg/m²   Physical Exam  Vitals and nursing note reviewed. Constitutional:       Appearance: She is well-developed. She is not toxic-appearing. HENT:      Head: Normocephalic. Right Ear: Tympanic membrane, ear canal and external ear normal.      Left Ear: Tympanic membrane, ear canal and external ear normal.      Mouth/Throat:      Pharynx: No oropharyngeal exudate or posterior oropharyngeal erythema. Eyes:      General: No scleral icterus. Extraocular Movements: Extraocular movements intact.       Conjunctiva/sclera: Conjunctivae normal.      Pupils: Pupils are equal, round, and reactive to light. Neck:      Thyroid: No thyroid mass or thyromegaly. Vascular: No carotid bruit. Cardiovascular:      Rate and Rhythm: Normal rate and regular rhythm. Heart sounds: Normal heart sounds. No murmur heard. Pulmonary:      Effort: Pulmonary effort is normal.      Breath sounds: Normal breath sounds. Musculoskeletal:      Right lower le+ Edema present. Left lower le+ Edema present. Lymphadenopathy:      Cervical: No cervical adenopathy. Neurological:      General: No focal deficit present. Mental Status: She is alert and oriented to person, place, and time. Psychiatric:         Mood and Affect: Mood normal.         Behavior: Behavior normal. Behavior is cooperative. ASSESSMENT/PLAN:    Problem List Items Addressed This Visit       Chronic heart failure with preserved ejection fraction (HCC) (Chronic)      Has been having more difficulty with fluid overload and has been requiring weekly IV Lasix and is now on a 2 cup/day ice fluid restriction. Continues to follow with Dr. Justice England. Chronic daily headache      Has been referred to neurology at [facility] and has had difficulty connecting with neurology through 2 virtual visits. Care everywhere reviewed and looks like patient strange listed as a no-show. Patient's  is going to reach out to you see neurology again. Dog bite      Patient sustained dog bite to her left hand in 2022. Had fracture of left proximal phalanx as well as multiple lacerations. Has been going to OT. Tremor     Etiology unclear. Patient's  continues to try to get her scheduled with neurology. Vertigo     Continue meclizine as needed.           Other Visit Diagnoses       Need for influenza vaccination    -  Primary    Relevant Orders    Influenza, FLUCELVAX, (age 10 mo+), IM, Preservative Free, 0.5 mL (Completed)            Current Outpatient Medications   Medication Sig Dispense Refill    omeprazole (PRILOSEC) 20 MG delayed release capsule TAKE 1 CAPSULE BY MOUTH TWO TIMES A DAY 60 capsule 5    FEROSUL 325 (65 Fe) MG tablet TAKE 1 TABLET BY MOUTH TWO TIMES A DAY WITH MEALS 180 tablet 0    metoprolol tartrate (LOPRESSOR) 25 MG tablet TAKE 1 TABLET BY MOUTH TWO TIMES A  tablet 3    Febuxostat 80 MG TABS TAKE 1 TABLET BY MOUTH EVERY DAY 90 tablet 1    leflunomide (ARAVA) 20 MG tablet TAKE 1 TABLET BY MOUTH EVERY DAY 90 tablet 0    sulfaSALAzine (AZULFIDINE) 500 MG tablet Take 1 tablet by mouth two times daily 180 tablet 0    SM SENNA-S 8.6-50 MG per tablet TAKE 2 TABLETS BY MOUTH TWO TIMES A  tablet 5    torsemide (DEMADEX) 100 MG tablet TAKE 1 TABLET BY MOUTH IN THE MORNING AND 1/2 TABLET IN THE EVENING.  135 tablet 0    spironolactone (ALDACTONE) 50 MG tablet TAKE 2 TABLETS BY MOUTH IN THE MORNING and 1 tablet in the evening 270 tablet 0    solifenacin (VESICARE) 10 MG tablet Take 1 tablet by mouth daily 90 tablet 3    meclizine (ANTIVERT) 25 MG tablet Take 1 tablet by mouth 3 times daily as needed for Dizziness 30 tablet 2    montelukast (SINGULAIR) 10 MG tablet Take 1 tablet by mouth nightly 30 tablet 5    TRESIBA FLEXTOUCH 200 UNIT/ML SOPN INJECT 200 UNITS SUBCUTANEOUSLY ONE TIME DAILY 54 mL 3    lubiprostone (AMITIZA) 24 MCG capsule TAKE 1 CAPSULE BY MOUTH TWO TIMES A DAY WITH MEALS 60 capsule 5    insulin aspart (NOVOLOG FLEXPEN) 100 UNIT/ML injection pen inject 30 to 50 units into the skin three time daily before meals 60 mL 3    levothyroxine (SYNTHROID) 150 MCG tablet TAKE 1 TABLET BY MOUTH IN THE MORNING before breakfast 90 tablet 1    vitamin D3 (CHOLECALCIFEROL) 25 MCG (1000 UT) TABS tablet TAKE 3 TABLETS BY MOUTH ONE TIME A DAY 90 tablet 5    STEGLATRO 5 MG TABS TAKE 1 TABLET BY MOUTH EVERY DAY 90 tablet 1    ranolazine (RANEXA) 500 MG extended release tablet TAKE 1 TABLET BY MOUTH TWO TIMES A  tablet 3    Urea 40 % LOTN Apply to feet nightly and cover with Aquaphor 325 mL 5 metOLazone (ZAROXOLYN) 2.5 MG tablet TAKE 1 TABLET BY MOUTH TWO TIMES A WEEK AS NEEDED FOR WEIGHT OVER 180 POUNDS. DO NOT TAKE 2 DAYS IN A ROW. (Patient taking differently: Take 2.5 mg by mouth twice a week) 24 tablet 0    albuterol (PROVENTIL) (2.5 MG/3ML) 0.083% nebulizer solution Take 3 mLs by nebulization every 6 hours as needed for Wheezing or Shortness of Breath 120 each 1    polyethylene glycol (GLYCOLAX) 17 GM/SCOOP powder TAKE 17 GRAMS (1 CAPFUL) BY MOUTH NIGHTLY 510 g 0    LINZESS 290 MCG CAPS capsule TAKE 1 CAPSULE BY MOUTH ONE TIME A DAY FOR 90 DAYS      REXULTI 2 MG TABS tablet Take 2 mg by mouth at bedtime       methocarbamol (ROBAXIN-750) 750 MG tablet Take 1 tablet by mouth 3 times daily as needed (pain and spasm) 30 tablet 0    nystatin (MYCOSTATIN) 357287 UNIT/ML suspension Take 5 mLs by mouth 4 times daily 500 mL 1    traZODone (DESYREL) 50 MG tablet Take 1 tablet by mouth nightly Take it on the day of sleep study 1 tablet 0    loratadine (CLARITIN) 10 MG tablet TAKE 1 TABLET BY MOUTH ONE TIME A DAY  30 tablet 5    OXYGEN Inhale 2 L into the lungs nightly Sometimes with activity      oxyCODONE (OXYCONTIN) 20 MG extended release tablet Take 20 mg by mouth every 12 hours. aspirin 81 MG EC tablet Take 81 mg by mouth daily      benztropine (COGENTIN) 1 MG tablet Take 1 mg by mouth nightly       oxyCODONE-acetaminophen (PERCOCET)  MG per tablet Take 1 tablet by mouth every 4 hours as needed for Pain . Earliest Fill Date: 6/8/17 100 tablet 0    duloxetine (CYMBALTA) 60 MG capsule Take 60 mg by mouth 2 times daily. nitroGLYCERIN (NITROSTAT) 0.4 MG SL tablet PLACE ONE TABLET UNDER TONGUE AS NEEDED FOR CHEST PAIN, MAY REPEAT EVERY 5 MINUTES AS NEEDED UP TO A MAX OF 3 TABLETS.   IF NO RELIEF AFTER 1 DOSE, CALL 911. 25 tablet 2    pregabalin (LYRICA) 75 MG capsule TAKE 1 CAPSULE BY MOUTH TWO TIMES A DAY 60 capsule 2    Continuous Blood Gluc Sensor (FREESTYLE EMERALD 2 SENSOR) MISC Change every 14 days 2 each 5    Insulin Pen Needle (B-D UF III MINI PEN NEEDLES) 31G X 5 MM MISC use five times daily with insulin 200 each 5    Blood Glucose Monitoring Suppl (ONETOUCH VERIO) w/Device KIT Use to check glucose 4 times daily. 1 kit 0    Lidocaine 5 % CREA Apply to feet QID prn pain for peripheral neuropathy 30 g 5    butalbital-acetaminophen-caffeine (FIORICET, ESGIC) -40 MG per tablet Take 1 tablet by mouth every 6 hours as needed for Headaches 30 tablet 1    rosuvastatin (CRESTOR) 10 MG tablet Take 1 tablet by mouth nightly (Patient not taking: Reported on 10/3/2022) 90 tablet 3    mineral oil-hydrophilic petrolatum (HYDROPHOR) ointment Apply topically as needed to hands and feet. 454 g 5    Compression Stockings MISC by Does not apply route Zippered stockings for daily use on lower extremities (do not wear at night). 20-30mmHg. (Patient not taking: Reported on 9/28/2022) 1 each 1    ONETOUCH VERIO strip use to test blood sugar 5 times daily 200 strip 5    Continuous Blood Gluc  (FREESTYLE EMERALD 2 READER) MINDY To check glucose levels 1 each 0    ketoconazole (NIZORAL) 2 % shampoo Apply topically daily as needed. (Patient not taking: Reported on 9/28/2022) 120 mL 1    diazePAM (VALIUM) 5 MG tablet Take 5 mg by mouth 2 times daily as needed for Anxiety. No current facility-administered medications for this visit. Return in about 2 months (around 12/26/2022).

## 2022-10-27 ENCOUNTER — HOSPITAL ENCOUNTER (OUTPATIENT)
Dept: ONCOLOGY | Age: 52
Setting detail: INFUSION SERIES
Discharge: HOME | End: 2022-10-27
Payer: COMMERCIAL

## 2022-10-27 ENCOUNTER — HOSPITAL ENCOUNTER (OUTPATIENT)
Dept: OCCUPATIONAL THERAPY | Age: 52
Setting detail: THERAPIES SERIES
Discharge: HOME | End: 2022-10-27
Payer: COMMERCIAL

## 2022-10-27 VITALS
HEART RATE: 63 BPM | TEMPERATURE: 97.1 F | DIASTOLIC BLOOD PRESSURE: 61 MMHG | SYSTOLIC BLOOD PRESSURE: 108 MMHG | RESPIRATION RATE: 18 BRPM

## 2022-10-27 DIAGNOSIS — I50.32 CHRONIC DIASTOLIC CONGESTIVE HEART FAILURE (HCC): ICD-10-CM

## 2022-10-27 DIAGNOSIS — I50.32 CHRONIC HEART FAILURE WITH PRESERVED EJECTION FRACTION (HCC): Primary | ICD-10-CM

## 2022-10-27 PROCEDURE — 6360000002 HC RX W HCPCS: Performed by: INTERNAL MEDICINE

## 2022-10-27 PROCEDURE — 97110 THERAPEUTIC EXERCISES: CPT

## 2022-10-27 PROCEDURE — 99211 OFF/OP EST MAY X REQ PHY/QHP: CPT

## 2022-10-27 PROCEDURE — 96372 THER/PROPH/DIAG INJ SC/IM: CPT

## 2022-10-27 PROCEDURE — 2580000003 HC RX 258: Performed by: INTERNAL MEDICINE

## 2022-10-27 PROCEDURE — 97140 MANUAL THERAPY 1/> REGIONS: CPT

## 2022-10-27 PROCEDURE — 97530 THERAPEUTIC ACTIVITIES: CPT

## 2022-10-27 RX ORDER — FUROSEMIDE 10 MG/ML
120 INJECTION INTRAMUSCULAR; INTRAVENOUS ONCE
Status: COMPLETED | OUTPATIENT
Start: 2022-10-27 | End: 2022-10-27

## 2022-10-27 RX ORDER — FUROSEMIDE 10 MG/ML
120 INJECTION INTRAMUSCULAR; INTRAVENOUS ONCE
Status: CANCELLED
Start: 2022-11-03 | End: 2022-11-03

## 2022-10-27 RX ORDER — SODIUM CHLORIDE 0.9 % (FLUSH) 0.9 %
5-40 SYRINGE (ML) INJECTION ONCE
Status: CANCELLED
Start: 2022-11-03 | End: 2022-11-03

## 2022-10-27 RX ORDER — SODIUM CHLORIDE 0.9 % (FLUSH) 0.9 %
5-40 SYRINGE (ML) INJECTION ONCE
Status: COMPLETED | OUTPATIENT
Start: 2022-10-27 | End: 2022-10-27

## 2022-10-27 RX ADMIN — Medication 10 ML: at 14:27

## 2022-10-27 RX ADMIN — FUROSEMIDE 120 MG: 10 INJECTION, SOLUTION INTRAMUSCULAR; INTRAVENOUS at 14:22

## 2022-10-27 NOTE — PROGRESS NOTES
Patient to department for weekly IV lasix. No labs due with this visit. lasix  120 mg at 20 mg per minute. Tolerated well. Patient aware that she will have urinary urgency and frequency. Also aware that this tx may lower his b/p and he may feel dizzy upon standing. All questions answered. To return next week for weekly lasix injection.  at side to take patient home.

## 2022-10-27 NOTE — FLOWSHEET NOTE
[address] [facility] Occupational Therapy  [address] [place] [address] Mikala Palomo, [facility]  Phone: [phone]   Fax: [phone]      Occupational Therapy Daily Treatment Note  Date:  10/27/2022    Patient: Álvaro Friday   : 1970   MRN: [id number]  Referring Physician: Dr. Prateek Will MD         Medical Diagnosis Information: B94.794L - L thumb proximal phalanx fx and multiple lacerations on bilateral hands     Date of Injury: 22, dog bites  Date of Surgery: non-op                                      Insurance information: Kateryna DOMINGUEZ, no ded, no copay    Plan of care sent to provider:      [x]Faxed   []Co-signature    (attempts: 1[x] 2[] 3[])       Progress Report: []  Yes  [x]  No     Date Range for reporting period:  Beginning: 10/5/22   Ending: end of POC    Progress report due (10 Rx/or 30 days whichever is less): visit #12 or      Recertification due (POC duration/ or 90 days whichever is less): visit #12 or 23     Visit # Insurance Allowable Auth required? Date Range   Eval +  TE- 50  Fluido- 16  Paraffin- 10  Manual- 48  TA- 24 [x]  Yes  []  No 10/5/22-22     Latex Allergy:  [x]No      []Yes  Pacemaker:  [x] No       [] Yes     Preferred Language for Healthcare:   []English       []other: Portuguese    Pain level:  5/10, L thumb; 1/10 R middle finger    SUBJECTIVE:  Pt reports she got new orthotic shoes today and is much more comfortable. Reports mild decreased sensation R middle finger. Pt declined use of video , communicating primarily in [address] [place] [address] with some interpretation assistance from .      Functional Disability Index:  Quick DASH: total score- 54, disability score- 97.73%        OBJECTIVE: See eval    Date Right Left   10/5/22 MCP circumference- 20.5 cm  Wrist circumference- 18.1 cm  Thumb IPJ circumference- 7.6 cm MCP circumference- 24 cm  Wrist circumference- 21 cm  Thumb IPJ circumference- 9.6 cm    10/17/22 MCP circumference- 23.9 cm  Wrist circumference- 21.8 cm  Thumb IPJ circumference- 9.2 cm      AROM     L R   Wrist Flexion  0-15 0-45   Wrist Extension  0-20 0-35   CMC Abduction 0-30 0-45   1st Digit MCP Extension-Flexion 0-45     1st Digit IP Extension-Flexion Maintained in 15* flexion    10/17/22: 5-20 0-62       Composite Flexion (Tip of 3rd Digit to Distal Palmar Crease (cm)) 0.5 cm     10/17/22: 0.25 cm 1.5 cm     10/17/22: 0 cm       Hand Assessment Left  Right  Comments    9 Hole Peg Test (s)  54  30 Will assess once lacerations fully heal    Strength (lbs)     Will assess once lacerations and fx heal    Lateral Pinch Strength (lbs)         Palmar Pinch Strength (lbs)         Tip Pinch Strength (lbs)         Opposition (Y/N) To 2nd digit  10/17/22: to 4th digit (1.25 cm lag to 5th) Y        RESTRICTIONS/PRECAUTIONS: thumb spica splint with IP immobilization \"at all times\" (f/u with MD in 6 weeks from 9/30/22)    Exercises/Interventions: Treatment focused on edema control and AROM to prevent stiffness and promote circulation and wound healing, as well as R hand strengthening. Session initiated with L hand MLD for edema control, followed by wash cloth scrunches. Pt then completed the following TE with fatigue, but no pain. Thumb opposition to each digit x8 each hand (lag to 5th digit on L hand); progressed to completing with in-hand manipulation of blue foam cube with increased time L hand  Bilateral finger ab/adduction  R hand maintain cylindrical grasp on red flexbar during twisting, bending, lifting   Cylindrical grasp of vertical dowel leena- R hand press and pull in green theraputty x10 each, L hand press in red theraputty x10  Finger extension with red theraputty loop x8 each hand  Session concluded with STM of L palmar hand due to tightness with trigger point within thenar eminence; decreased tightness noted following with improved thumb abduction and extension.      Therapeutic Exercises Resistance / level Sets/sec Reps Notes                                                                                Neuromuscular Re-ed / Therapeutic Activities                                                 Manual Intervention                                                     Modalities:     Patient education:  Eval, POC, HEP, splint wear- pt verbalized understanding     10/13/22: wound care    Home Exercise Program:  Thumb spica splint with IP immobilization- \"wear at all times\" (f/u with MD in 5 weeks from eval)  Tendon glides  Finger ab/adduction  Thumb opposition    Therapeutic Exercise and NMR:  [x] (06287) Provided verbal/tactile cueing for activities related to strengthening, flexibility, endurance, ROM  for improvements in scapular, scapulothoracic and UE control with self care, reaching, carrying, lifting, house/yardwork, driving/computer work.    [] (75414) Provided verbal/tactile cueing for activities related to improving balance, coordination, kinesthetic sense, posture, motor skill, proprioception  to assist with  scapular, scapulothoracic and UE control with self care, reaching, carrying, lifting, house/yardwork, driving/computer work.   [] Comments:    Therapeutic Activities:    [x] (27235 or 76546) Provided verbal/tactile cueing for activities related to improving balance, coordination, kinesthetic sense, posture, motor skill, proprioception and motor activation to allow for proper function of scapular, scapulothoracic and UE control with self care, carrying, lifting, driving/computer work  [] Comments:    Home Exercise Program:    [x] (21551) Reviewed/Progressed HEP activities related to strengthening, flexibility, endurance, ROM of scapular, scapulothoracic and UE control with self care, reaching, carrying, lifting, house/yardwork, driving/computer work  [] (15599) Reviewed/Progressed HEP activities related to improving balance, coordination, kinesthetic sense, posture, motor skill, proprioception of scapular, scapulothoracic and UE control with self care, reaching, carrying, lifting, house/yardwork, driving/computer work    [] Comments:    Manual Treatments:  PROM / STM / Oscillations-Mobs:  G-I, II, III, IV (PA's, Inf., Post.)  [x] (94873) Provided manual therapy to mobilize soft tissue/joints of cervical/CT, scapular GHJ and UE for the purpose of modulating pain, promoting relaxation,  increasing ROM, reducing/eliminating soft tissue swelling/inflammation/restriction, improving soft tissue extensibility and allowing for proper ROM for normal function with self care, reaching, carrying, lifting, house/yardwork, driving/computer work  [] Comments:    ADL Training:  [x] (67021) Provided self-care/home management training related to activities of daily living and compensatory training, and/or use of adaptive equipment   [] Comments:     Splinting:  [] Fabrication of:   [] (97077) Orthotic/Prosthetic Management, subsequent encounter  [] (74483) Orthotic management and training (fitting and assessment)  [] Comments:      Charges:  Timed Code Treatment Minutes: 40   Total Treatment Minutes: 40     [] EVAL (LOW) 00135   [] OT Re-eval (12005)  [] EVAL (MOD) 04391   [] EVAL (HIGH) 05532       [x] Natalia (05516) x   1  [] GXYRE(96647)  [] NMR (42781) x     [] Estim (attended) (95395)   [x] Manual ([address] [facility]) x   1 [] US (56909)  [x] TA (18666) x    1 [] Paraffin (83472)  [] ADL  (13 649 24 60) x    [] Splint/L code: , 24477   [] Estim (unattended) ([id number])  [] Fluidotherapy (61355)  [] Other:    GOALS:   Patient stated goal: decrease pain  [x] Progressing: [] Met: [] Not Met: [] Adjusted     Therapist goals for Patient:   Short Term Goals: To be achieved in: 30 days  1. Pt will decrease swelling and increase bilateral hand ROM to make a full fist for improved functional hand use by 11/5/22. [x] Progressing: [] Met: [] Not Met: [] Adjusted   2.  Pt will decrease pain and improve ROM to tolerate hand  strength test using dynamometer for improved strength by 11/5/22. [x] Progressing: [] Met: [] Not Met: [] Adjusted  3. Pt will decrease pain and improve bilateral hand fine motor control to complete 9-hole peg task in less than 45 seconds without increased pain to improve coordination in ADLs by 11/5/22. [x] Progressing: [] Met: [] Not Met: [] Adjusted  4. Pt will decrease pain to report ability to sleep throughout the night without waking due to hand pain to improve quality of life by 11/5/22. [x] Progressing: [] Met: [] Not Met: [] Adjusted        Long Term Goals: To be achieved by discharge  1. Pt will report a QuickDASH Symptom Severity Scale score of 80% or less indicating increased safety and functional independence in desired occupational pursuits by discharge. [x] Progressing: [] Met: [] Not Met: [] Adjusted    Progression Towards Functional goals:  [x] Patient is progressing as expected towards functional goals listed. [] Progression is slowed due to complexities listed. [] Progression has been slowed due to co-morbidities. [] Plan just implemented, too soon to assess goals progression  [] All goals are met  [] Other:     ASSESSMENT:  Good tolerance of strengthening with decreased endurance noted bilaterally. Continue edema control, gentle ROM, and strengthening.      Treatment/Activity Tolerance:  [x] Patient tolerated treatment well [] Patient limited by fatigue  [] Patient limited by pain  [] Patient limited by other medical complications  [] Other:     Prognosis: [x] Good [] Fair  [] Poor    Patient Requires Follow-up: [x] Yes  [] No    PLAN: See eval  [x] Continue per plan of care [] Alter current plan (see comments)  [] Plan of care initiated [] Hold pending MD visit [] Discharge    Electronically signed by:     RAFI Moore/L, C/NDT ([id number])         Note: If patient does not return for scheduled/ recommended follow up visits, this note will serve as a discharge from care along with most recent update on progress.

## 2022-10-31 ENCOUNTER — HOSPITAL ENCOUNTER (OUTPATIENT)
Dept: OCCUPATIONAL THERAPY | Age: 52
Setting detail: THERAPIES SERIES
Discharge: HOME | End: 2022-10-31
Payer: COMMERCIAL

## 2022-10-31 PROCEDURE — 97022 WHIRLPOOL THERAPY: CPT

## 2022-10-31 PROCEDURE — 97110 THERAPEUTIC EXERCISES: CPT

## 2022-10-31 PROCEDURE — 97530 THERAPEUTIC ACTIVITIES: CPT

## 2022-10-31 PROCEDURE — 97140 MANUAL THERAPY 1/> REGIONS: CPT

## 2022-10-31 NOTE — FLOWSHEET NOTE
[address] [facility] Occupational Therapy  [address] [place] [address]  Phone: [phone]   Fax: [phone]      Occupational Therapy Daily Treatment Note  Date:  10/31/2022    Patient: Portia Riley   : 1970   MRN: [id number]  Referring Physician: Dr. Khari Guillen MD         Medical Diagnosis Information: W36.669E - L thumb proximal phalanx fx and multiple lacerations on bilateral hands     Date of Injury: 22, dog bites  Date of Surgery: non-op                                      Insurance information: Kateryna DOMINGUEZ, no ded, no copay    Plan of care sent to provider:      [x]Faxed   []Co-signature    (attempts: 1[x] 2[] 3[])       Progress Report: []  Yes  [x]  No     Date Range for reporting period:  Beginning: 10/5/22   Ending: end of POC    Progress report due (10 Rx/or 30 days whichever is less): visit #12 or      Recertification due (POC duration/ or 90 days whichever is less): visit #12 or 23     Visit # Insurance Allowable Auth required? Date Range   Eval +  TE- 50  Fluido- 16  Paraffin- 10  Manual- 48  TA- 24 [x]  Yes  []  No 10/5/22-22     Latex Allergy:  [x]No      []Yes  Pacemaker:  [x] No       [] Yes     Preferred Language for Healthcare:   []English       []other: Lithuanian    Pain level:  5/10, L thumb; 1/10 R middle finger    SUBJECTIVE:  Pt reports dizziness when walking into clinic. Pt declined use of video , communicating primarily in [address] [place] [address] with some interpretation assistance from .      Functional Disability Index:  Quick DASH: total score- 54, disability score- 97.73%        OBJECTIVE: See eval    Date Right Left   10/5/22 MCP circumference- 20.5 cm  Wrist circumference- 18.1 cm  Thumb IPJ circumference- 7.6 cm MCP circumference- 24 cm  Wrist circumference- 21 cm  Thumb IPJ circumference- 9.6 cm    10/17/22    MCP circumference- 23.9 cm  Wrist circumference- 21.8 cm  Thumb IPJ circumference- 9.2 cm      AROM     L R   Wrist Flexion  0-15 0-45   Wrist Extension  0-20 0-35   CMC Abduction 0-30 0-45   1st Digit MCP Extension-Flexion 0-45     1st Digit IP Extension-Flexion Maintained in 15* flexion    10/17/22: 5-20 0-62       Composite Flexion (Tip of 3rd Digit to Distal Palmar Crease (cm)) 0.5 cm     10/17/22: 0.25 cm 1.5 cm     10/17/22: 0 cm       Hand Assessment Left  Right  Comments    9 Hole Peg Test (s)  54  30 Will assess once lacerations fully heal    Strength (lbs)     Will assess once lacerations and fx heal    Lateral Pinch Strength (lbs)         Palmar Pinch Strength (lbs)         Tip Pinch Strength (lbs)         Opposition (Y/N) To 2nd digit  10/17/22: to 4th digit (1.25 cm lag to 5th) Y      10/31/22: /66, SpO2 88% after ~2 minutes seated and 92% after additional 60 seconds seated      RESTRICTIONS/PRECAUTIONS: thumb spica splint with IP immobilization \"at all times\" (f/u with MD in 6 weeks from 9/30/22)    Exercises/Interventions: Treatment focused on edema control and AROM to prevent stiffness and promote circulation and wound healing, as well as R hand strengthening. Session initiated with BP and SpO2 measures as above; discussed likely desat associated with functional mobility and importance of implementing energy conservation techniques and notifying MD of symptoms. Pt's  reports MD is aware. Noted increased L hand edema with MLD followed by 8 minutes fluidotherapy with AROM for further edema control.  Pt completed the following tasks for strengthening and ROM benefits with fatigue reported:  Thumb opposition to each digit x3 each hand (lag to 5th digit on L hand)  Bilateral finger ab/adduction  Bilateral isolated finger MCP extension into PIP and DIP flexion x2 each   R hand: in-hand manipulation of 4 squigs to place and retrieve on table x3; progressed to use of yellow gripper x12 and red gripper x4   L hand: removal and placement of toothpicks in blue foam block for opposition and tip pinch; adapted to yellow gripper for additional strengthening   Instructed in use of isotoner glove for 30 minutes after session for additional edema control, followed by gentle active use with pt verbalizing understanding. Therapeutic Exercises  Resistance / level Sets/sec Reps Notes                                                                                Neuromuscular Re-ed / Therapeutic Activities                                                 Manual Intervention                                                     Modalities:     Patient education:  Eval, POC, HEP, splint wear- pt verbalized understanding     10/13/22: wound care    Home Exercise Program:  Thumb spica splint with IP immobilization- \"wear at all times\" (f/u with MD in 5 weeks from eval)  Tendon glides  Finger ab/adduction  Thumb opposition    Therapeutic Exercise and NMR:  [x] (55041) Provided verbal/tactile cueing for activities related to strengthening, flexibility, endurance, ROM  for improvements in scapular, scapulothoracic and UE control with self care, reaching, carrying, lifting, house/yardwork, driving/computer work.    [] (55613) Provided verbal/tactile cueing for activities related to improving balance, coordination, kinesthetic sense, posture, motor skill, proprioception  to assist with  scapular, scapulothoracic and UE control with self care, reaching, carrying, lifting, house/yardwork, driving/computer work.   [] Comments:    Therapeutic Activities:    [x] (56564 or 87535) Provided verbal/tactile cueing for activities related to improving balance, coordination, kinesthetic sense, posture, motor skill, proprioception and motor activation to allow for proper function of scapular, scapulothoracic and UE control with self care, carrying, lifting, driving/computer work  [] Comments:    Home Exercise Program:    [x] (75578) Reviewed/Progressed HEP activities related to strengthening, flexibility, endurance, ROM of scapular, scapulothoracic and UE control with self care, reaching, carrying, lifting, house/yardwork, driving/computer work  [] (20274) Reviewed/Progressed HEP activities related to improving balance, coordination, kinesthetic sense, posture, motor skill, proprioception of scapular, scapulothoracic and UE control with self care, reaching, carrying, lifting, house/yardwork, driving/computer work    [] Comments:    Manual Treatments:  PROM / STM / Oscillations-Mobs:  G-I, II, III, IV (PA's, Inf., Post.)  [x] (02634) Provided manual therapy to mobilize soft tissue/joints of cervical/CT, scapular GHJ and UE for the purpose of modulating pain, promoting relaxation,  increasing ROM, reducing/eliminating soft tissue swelling/inflammation/restriction, improving soft tissue extensibility and allowing for proper ROM for normal function with self care, reaching, carrying, lifting, house/yardwork, driving/computer work  [] Comments:    ADL Training:  [] (20074) Provided self-care/home management training related to activities of daily living and compensatory training, and/or use of adaptive equipment   [] Comments:     Splinting:  [] Fabrication of:   [] (91631) Orthotic/Prosthetic Management, subsequent encounter  [] (55238) Orthotic management and training (fitting and assessment)  [] Comments:      Charges:  Timed Code Treatment Minutes: 47   Total Treatment Minutes: 55     [] EVAL (LOW) 87058   [] OT Re-eval (99651)  [] EVAL (MOD) 49360   [] EVAL (HIGH) 18509       [x] Natalia (19025) x   1  [] IQUHB(40957)  [] NMR (17333) x     [] Estim (attended) (19671)   [x] Manual ([id number]) x   1 [] US (45166)  [x] TA (34334) x    1 [] Paraffin (87381)  [] ADL  (36 649 24 60) x    [] Splint/L code: , 31444   [] Estim (unattended) (22 470189)  [x] Fluidotherapy (89024)  [] Other:    GOALS:   Patient stated goal: decrease pain  [x] Progressing: [] Met: [] Not Met: [] Adjusted     Therapist goals for Patient:   Short Term Goals:  To be achieved in: 30 days  1. Pt will decrease swelling and increase bilateral hand ROM to make a full fist for improved functional hand use by 11/5/22. [x] Progressing: [] Met: [] Not Met: [] Adjusted   2. Pt will decrease pain and improve ROM to tolerate hand  strength test using dynamometer for improved strength by 11/5/22. [x] Progressing: [] Met: [] Not Met: [] Adjusted  3. Pt will decrease pain and improve bilateral hand fine motor control to complete 9-hole peg task in less than 45 seconds without increased pain to improve coordination in ADLs by 11/5/22. [x] Progressing: [] Met: [] Not Met: [] Adjusted  4. Pt will decrease pain to report ability to sleep throughout the night without waking due to hand pain to improve quality of life by 11/5/22. [x] Progressing: [] Met: [] Not Met: [] Adjusted        Long Term Goals: To be achieved by discharge  1. Pt will report a QuickDASH Symptom Severity Scale score of 80% or less indicating increased safety and functional independence in desired occupational pursuits by discharge. [x] Progressing: [] Met: [] Not Met: [] Adjusted    Progression Towards Functional goals:  [x] Patient is progressing as expected towards functional goals listed. [] Progression is slowed due to complexities listed. [] Progression has been slowed due to co-morbidities. [] Plan just implemented, too soon to assess goals progression  [] All goals are met  [] Other:     ASSESSMENT:  Good tolerance of strengthening with decreased endurance noted bilaterally. Continue edema control, gentle ROM, and strengthening.      Treatment/Activity Tolerance:  [x] Patient tolerated treatment well [] Patient limited by fatigue  [] Patient limited by pain  [] Patient limited by other medical complications  [] Other:     Prognosis: [x] Good [] Fair  [] Poor    Patient Requires Follow-up: [x] Yes  [] No    PLAN: See eval  [x] Continue per plan of care [] Alter current plan (see comments)  [] Plan of care initiated [] Hold pending MD visit [] Discharge    Electronically signed by:     RAFI Krause/L, C/NDT ([id number])         Note: If patient does not return for scheduled/ recommended follow up visits, this note will serve as a discharge from care along with most recent update on progress.

## 2022-11-03 ENCOUNTER — HOSPITAL ENCOUNTER (OUTPATIENT)
Dept: ONCOLOGY | Age: 52
Setting detail: INFUSION SERIES
Discharge: HOME | End: 2022-11-03
Payer: COMMERCIAL

## 2022-11-03 ENCOUNTER — TELEPHONE (OUTPATIENT)
Dept: CARDIOLOGY CLINIC | Age: 52
End: 2022-11-03

## 2022-11-03 VITALS
SYSTOLIC BLOOD PRESSURE: 117 MMHG | TEMPERATURE: 98 F | RESPIRATION RATE: 16 BRPM | DIASTOLIC BLOOD PRESSURE: 66 MMHG | HEART RATE: 66 BPM

## 2022-11-03 DIAGNOSIS — I50.32 CHRONIC DIASTOLIC CONGESTIVE HEART FAILURE (HCC): ICD-10-CM

## 2022-11-03 DIAGNOSIS — I50.32 CHRONIC HEART FAILURE WITH PRESERVED EJECTION FRACTION (HCC): Primary | ICD-10-CM

## 2022-11-03 PROCEDURE — 99211 OFF/OP EST MAY X REQ PHY/QHP: CPT

## 2022-11-03 PROCEDURE — 96374 THER/PROPH/DIAG INJ IV PUSH: CPT

## 2022-11-03 PROCEDURE — 6360000002 HC RX W HCPCS: Performed by: INTERNAL MEDICINE

## 2022-11-03 PROCEDURE — 2580000003 HC RX 258: Performed by: INTERNAL MEDICINE

## 2022-11-03 RX ORDER — FUROSEMIDE 10 MG/ML
120 INJECTION INTRAMUSCULAR; INTRAVENOUS ONCE
Status: COMPLETED | OUTPATIENT
Start: 2022-11-03 | End: 2022-11-03

## 2022-11-03 RX ORDER — FUROSEMIDE 10 MG/ML
120 INJECTION INTRAMUSCULAR; INTRAVENOUS ONCE
Status: CANCELLED
Start: 2022-11-10 | End: 2022-11-10

## 2022-11-03 RX ORDER — SODIUM CHLORIDE 0.9 % (FLUSH) 0.9 %
5-40 SYRINGE (ML) INJECTION ONCE
Status: CANCELLED
Start: 2022-11-10 | End: 2022-11-10

## 2022-11-03 RX ORDER — SODIUM CHLORIDE 0.9 % (FLUSH) 0.9 %
5-40 SYRINGE (ML) INJECTION ONCE
Status: COMPLETED | OUTPATIENT
Start: 2022-11-03 | End: 2022-11-03

## 2022-11-03 RX ADMIN — Medication 10 ML: at 14:30

## 2022-11-03 RX ADMIN — FUROSEMIDE 120 MG: 10 INJECTION, SOLUTION INTRAMUSCULAR; INTRAVENOUS at 14:32

## 2022-11-03 NOTE — PROGRESS NOTES
Pt to Dept for Lasix IVP. IV Started, Lasix 120 mg IVP given over 6 minutes. Pt eliana with no adverse reaction noted. Denied need for printed AVS.Pt to return next week for same treatment. Pt to see NP for increased SOB and difficulties sleeping at night due to not being able to lay down. Pt reporting sleeping in chair.

## 2022-11-03 NOTE — TELEPHONE ENCOUNTER
If she is not losing much fluid, we have 2 choices,    Increase metolazone to 5 mg 30 min before the infusion OR    Give IV lasix 120 mg followed by 20 mg/hr x 4 hrs.     ARETHA

## 2022-11-03 NOTE — TELEPHONE ENCOUNTER
Spoke to  with [facility] options. He would like to proceed with the increase in oral Metolazone to 5mg on IV infusion days. He knows to give it to her 30 minutes prior to infusion time.  to call the office on 11/11/22 and let us know if the 5mg was beneficial.   Next IV Lasix is 11/10/22 at 2:00pm.      repeated instructions back to me.

## 2022-11-03 NOTE — TELEPHONE ENCOUNTER
Deysi states pt is having SOB while lying flat. RA saturation is 92%. She got Lasix 120mg IVP with Metolazone 2.5mg 30 minutes prior to infusion today. These are scheduled weekly. She only loses weight after the Lasix for a limited time (1 to 2 days of weight loss)     Deysi is asking if pt should be seen sooner. RGNP or KVNP.

## 2022-11-10 ENCOUNTER — HOSPITAL ENCOUNTER (OUTPATIENT)
Dept: ONCOLOGY | Age: 52
Setting detail: INFUSION SERIES
Discharge: HOME | End: 2022-11-10
Payer: COMMERCIAL

## 2022-11-10 VITALS
TEMPERATURE: 97.9 F | SYSTOLIC BLOOD PRESSURE: 129 MMHG | HEART RATE: 68 BPM | RESPIRATION RATE: 16 BRPM | DIASTOLIC BLOOD PRESSURE: 75 MMHG

## 2022-11-10 DIAGNOSIS — I50.32 CHRONIC DIASTOLIC CONGESTIVE HEART FAILURE (HCC): ICD-10-CM

## 2022-11-10 DIAGNOSIS — I50.32 CHRONIC HEART FAILURE WITH PRESERVED EJECTION FRACTION (HCC): Primary | ICD-10-CM

## 2022-11-10 LAB
ANION GAP SERPL CALCULATED.3IONS-SCNC: 12 MMOL/L (ref 3–16)
BUN BLDV-MCNC: 57 MG/DL (ref 7–20)
CALCIUM SERPL-MCNC: 10.2 MG/DL (ref 8.3–10.6)
CHLORIDE BLD-SCNC: 90 MMOL/L (ref 99–110)
CO2: 33 MMOL/L (ref 21–32)
CREAT SERPL-MCNC: 1.7 MG/DL (ref 0.6–1.1)
GFR SERPL CREATININE-BSD FRML MDRD: 35 ML/MIN/{1.73_M2}
GLUCOSE BLD-MCNC: 276 MG/DL (ref 70–99)
POTASSIUM SERPL-SCNC: 3.9 MMOL/L (ref 3.5–5.1)
PRO-BNP: 429 PG/ML (ref 0–124)
SODIUM BLD-SCNC: 135 MMOL/L (ref 136–145)

## 2022-11-10 PROCEDURE — 99211 OFF/OP EST MAY X REQ PHY/QHP: CPT

## 2022-11-10 PROCEDURE — 6360000002 HC RX W HCPCS: Performed by: INTERNAL MEDICINE

## 2022-11-10 PROCEDURE — 83880 ASSAY OF NATRIURETIC PEPTIDE: CPT

## 2022-11-10 PROCEDURE — 80048 BASIC METABOLIC PNL TOTAL CA: CPT

## 2022-11-10 PROCEDURE — 96374 THER/PROPH/DIAG INJ IV PUSH: CPT

## 2022-11-10 PROCEDURE — 2580000003 HC RX 258: Performed by: INTERNAL MEDICINE

## 2022-11-10 RX ORDER — SODIUM CHLORIDE 0.9 % (FLUSH) 0.9 %
5-40 SYRINGE (ML) INJECTION ONCE
Status: COMPLETED | OUTPATIENT
Start: 2022-11-10 | End: 2022-11-10

## 2022-11-10 RX ORDER — FUROSEMIDE 10 MG/ML
120 INJECTION INTRAMUSCULAR; INTRAVENOUS ONCE
Status: COMPLETED | OUTPATIENT
Start: 2022-11-10 | End: 2022-11-10

## 2022-11-10 RX ORDER — SODIUM CHLORIDE 0.9 % (FLUSH) 0.9 %
5-40 SYRINGE (ML) INJECTION ONCE
Status: CANCELLED
Start: 2022-11-17 | End: 2022-11-17

## 2022-11-10 RX ORDER — FUROSEMIDE 10 MG/ML
120 INJECTION INTRAMUSCULAR; INTRAVENOUS ONCE
Status: CANCELLED
Start: 2022-11-17 | End: 2022-11-17

## 2022-11-10 RX ADMIN — Medication 10 ML: at 14:12

## 2022-11-10 RX ADMIN — FUROSEMIDE 120 MG: 10 INJECTION, SOLUTION INTRAMUSCULAR; INTRAVENOUS at 14:12

## 2022-11-10 NOTE — PROGRESS NOTES
Pt to Dept for Lasix IVP. IV Started, Lasix 120 mg IVP given over 6 minutes. Pt eliana with no adverse reaction noted. Denied need for printed AVS.Pt to return next week for same treatment. Patient states metolazone was increased and she took 2 pills prior to coming today. Patient states 5 lb weight gain from yesterday to today. [facility].

## 2022-11-11 PROBLEM — R25.1 TREMOR: Status: ACTIVE | Noted: 2022-11-11
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
## 2022-11-11 ASSESSMENT — ENCOUNTER SYMPTOMS
DIARRHEA: 0
CHEST TIGHTNESS: 0
CONSTIPATION: 0
SHORTNESS OF BREATH: 0

## 2022-11-11 NOTE — ASSESSMENT & PLAN NOTE
Has been referred to neurology at [facility] and has had difficulty connecting with neurology through 2 virtual visits. Care everywhere reviewed and looks like patient strange listed as a no-show. Patient's  is going to reach out to you see neurology again.

## 2022-11-11 NOTE — ASSESSMENT & PLAN NOTE
Has been having more difficulty with fluid overload and has been requiring weekly IV Lasix and is now on a 2 cup/day ice fluid restriction. Continues to follow with Dr. Alma Crooks.

## 2022-11-11 NOTE — ASSESSMENT & PLAN NOTE
Patient sustained dog bite to her left hand in September 2022. Had fracture of left proximal phalanx as well as multiple lacerations. Has been going to OT.

## 2022-11-14 ENCOUNTER — TELEPHONE (OUTPATIENT)
Dept: CARDIOLOGY CLINIC | Age: 52
End: 2022-11-14

## 2022-11-14 NOTE — TELEPHONE ENCOUNTER
----- Message from Cat Gilman MD sent at 11/10/2022  4:52 PM EST -----  Call patient. Labs look okay. No changes.  ARETHA

## 2022-11-17 ENCOUNTER — HOSPITAL ENCOUNTER (OUTPATIENT)
Dept: ONCOLOGY | Age: 52
Setting detail: INFUSION SERIES
Discharge: HOME | End: 2022-11-17
Payer: COMMERCIAL

## 2022-11-17 VITALS
SYSTOLIC BLOOD PRESSURE: 135 MMHG | DIASTOLIC BLOOD PRESSURE: 76 MMHG | HEART RATE: 70 BPM | RESPIRATION RATE: 16 BRPM | TEMPERATURE: 96.5 F

## 2022-11-17 DIAGNOSIS — I50.32 CHRONIC HEART FAILURE WITH PRESERVED EJECTION FRACTION (HCC): Primary | ICD-10-CM

## 2022-11-17 DIAGNOSIS — I50.32 CHRONIC DIASTOLIC CONGESTIVE HEART FAILURE (HCC): ICD-10-CM

## 2022-11-17 PROCEDURE — 99211 OFF/OP EST MAY X REQ PHY/QHP: CPT

## 2022-11-17 PROCEDURE — 96375 TX/PRO/DX INJ NEW DRUG ADDON: CPT

## 2022-11-17 PROCEDURE — 96374 THER/PROPH/DIAG INJ IV PUSH: CPT

## 2022-11-17 PROCEDURE — 2580000003 HC RX 258: Performed by: INTERNAL MEDICINE

## 2022-11-17 PROCEDURE — 6360000002 HC RX W HCPCS: Performed by: INTERNAL MEDICINE

## 2022-11-17 RX ORDER — FUROSEMIDE 10 MG/ML
120 INJECTION INTRAMUSCULAR; INTRAVENOUS ONCE
Status: COMPLETED | OUTPATIENT
Start: 2022-11-17 | End: 2022-11-17

## 2022-11-17 RX ORDER — FUROSEMIDE 10 MG/ML
120 INJECTION INTRAMUSCULAR; INTRAVENOUS ONCE
Status: CANCELLED
Start: 2022-11-24 | End: 2022-11-24

## 2022-11-17 RX ORDER — SODIUM CHLORIDE 0.9 % (FLUSH) 0.9 %
5-40 SYRINGE (ML) INJECTION ONCE
Status: COMPLETED | OUTPATIENT
Start: 2022-11-17 | End: 2022-11-17

## 2022-11-17 RX ORDER — SODIUM CHLORIDE 0.9 % (FLUSH) 0.9 %
5-40 SYRINGE (ML) INJECTION ONCE
Status: CANCELLED
Start: 2022-11-24 | End: 2022-11-24

## 2022-11-17 RX ADMIN — FUROSEMIDE 120 MG: 10 INJECTION, SOLUTION INTRAMUSCULAR; INTRAVENOUS at 14:31

## 2022-11-17 RX ADMIN — SODIUM CHLORIDE, PRESERVATIVE FREE 10 ML: 5 INJECTION INTRAVENOUS at 14:32

## 2022-11-17 NOTE — PROGRESS NOTES
Pt to Dept for Lasix IVP. IV Started, Lasix 120 mg IVP given over 6 minutes. Pt eliana with no adverse reaction noted. Denied need for printed AVS.Pt to return next week for same treatment. Patient states 10 lb weight loss from yesterday to today. [facility].

## 2022-11-23 ENCOUNTER — HOSPITAL ENCOUNTER (OUTPATIENT)
Dept: ONCOLOGY | Age: 52
Setting detail: INFUSION SERIES
Discharge: HOME | End: 2022-11-23
Payer: COMMERCIAL

## 2022-11-23 VITALS
DIASTOLIC BLOOD PRESSURE: 71 MMHG | SYSTOLIC BLOOD PRESSURE: 130 MMHG | TEMPERATURE: 97 F | HEART RATE: 73 BPM | RESPIRATION RATE: 16 BRPM

## 2022-11-23 DIAGNOSIS — I50.32 CHRONIC DIASTOLIC CONGESTIVE HEART FAILURE (HCC): ICD-10-CM

## 2022-11-23 DIAGNOSIS — I50.32 CHRONIC HEART FAILURE WITH PRESERVED EJECTION FRACTION (HCC): Primary | ICD-10-CM

## 2022-11-23 PROCEDURE — 99211 OFF/OP EST MAY X REQ PHY/QHP: CPT

## 2022-11-23 PROCEDURE — 2580000003 HC RX 258: Performed by: INTERNAL MEDICINE

## 2022-11-23 PROCEDURE — 96374 THER/PROPH/DIAG INJ IV PUSH: CPT

## 2022-11-23 PROCEDURE — 6360000002 HC RX W HCPCS: Performed by: INTERNAL MEDICINE

## 2022-11-23 RX ORDER — TORSEMIDE 100 MG/1
TABLET ORAL
Qty: 135 TABLET | Refills: 0 | Status: SHIPPED | OUTPATIENT
Start: 2022-11-23

## 2022-11-23 RX ORDER — SODIUM CHLORIDE 0.9 % (FLUSH) 0.9 %
5-40 SYRINGE (ML) INJECTION ONCE
Status: CANCELLED
Start: 2022-11-24 | End: 2022-11-24

## 2022-11-23 RX ORDER — FUROSEMIDE 10 MG/ML
120 INJECTION INTRAMUSCULAR; INTRAVENOUS ONCE
Status: COMPLETED | OUTPATIENT
Start: 2022-11-23 | End: 2022-11-23

## 2022-11-23 RX ORDER — FUROSEMIDE 10 MG/ML
120 INJECTION INTRAMUSCULAR; INTRAVENOUS ONCE
Status: CANCELLED
Start: 2022-11-24 | End: 2022-11-24

## 2022-11-23 RX ORDER — SODIUM CHLORIDE 0.9 % (FLUSH) 0.9 %
5-40 SYRINGE (ML) INJECTION ONCE
Status: COMPLETED | OUTPATIENT
Start: 2022-11-23 | End: 2022-11-23

## 2022-11-23 RX ADMIN — Medication 10 ML: at 14:13

## 2022-11-23 RX ADMIN — FUROSEMIDE 120 MG: 10 INJECTION, SOLUTION INTRAMUSCULAR; INTRAVENOUS at 14:14

## 2022-11-29 DIAGNOSIS — I10 ESSENTIAL HYPERTENSION: ICD-10-CM

## 2022-11-29 DIAGNOSIS — R06.02 SOB (SHORTNESS OF BREATH): Chronic | ICD-10-CM

## 2022-11-29 DIAGNOSIS — I25.10 CORONARY ARTERY DISEASE INVOLVING NATIVE CORONARY ARTERY OF NATIVE HEART WITHOUT ANGINA PECTORIS: Chronic | ICD-10-CM

## 2022-11-29 DIAGNOSIS — I50.22 SYSTOLIC CHF, CHRONIC (HCC): ICD-10-CM

## 2022-11-29 DIAGNOSIS — I50.30 (HFPEF) HEART FAILURE WITH PRESERVED EJECTION FRACTION (HCC): Chronic | ICD-10-CM

## 2022-11-29 DIAGNOSIS — N28.9 RENAL INSUFFICIENCY: Chronic | ICD-10-CM

## 2022-12-01 ENCOUNTER — HOSPITAL ENCOUNTER (OUTPATIENT)
Dept: ONCOLOGY | Age: 52
Setting detail: INFUSION SERIES
Discharge: HOME | End: 2022-12-01
Payer: COMMERCIAL

## 2022-12-01 VITALS
SYSTOLIC BLOOD PRESSURE: 126 MMHG | TEMPERATURE: 97 F | HEART RATE: 64 BPM | DIASTOLIC BLOOD PRESSURE: 74 MMHG | RESPIRATION RATE: 16 BRPM

## 2022-12-01 DIAGNOSIS — I50.32 CHRONIC HEART FAILURE WITH PRESERVED EJECTION FRACTION (HCC): Primary | ICD-10-CM

## 2022-12-01 DIAGNOSIS — I50.32 CHRONIC DIASTOLIC CONGESTIVE HEART FAILURE (HCC): ICD-10-CM

## 2022-12-01 PROCEDURE — 6360000002 HC RX W HCPCS: Performed by: INTERNAL MEDICINE

## 2022-12-01 PROCEDURE — 96374 THER/PROPH/DIAG INJ IV PUSH: CPT

## 2022-12-01 PROCEDURE — 2580000003 HC RX 258: Performed by: INTERNAL MEDICINE

## 2022-12-01 PROCEDURE — 99211 OFF/OP EST MAY X REQ PHY/QHP: CPT

## 2022-12-01 RX ORDER — SODIUM CHLORIDE 0.9 % (FLUSH) 0.9 %
5-40 SYRINGE (ML) INJECTION ONCE
Status: CANCELLED
Start: 2022-12-08 | End: 2022-12-08

## 2022-12-01 RX ORDER — SODIUM CHLORIDE 0.9 % (FLUSH) 0.9 %
5-40 SYRINGE (ML) INJECTION ONCE
Status: COMPLETED | OUTPATIENT
Start: 2022-12-01 | End: 2022-12-01

## 2022-12-01 RX ORDER — FUROSEMIDE 10 MG/ML
120 INJECTION INTRAMUSCULAR; INTRAVENOUS ONCE
Status: COMPLETED | OUTPATIENT
Start: 2022-12-01 | End: 2022-12-01

## 2022-12-01 RX ORDER — FUROSEMIDE 10 MG/ML
120 INJECTION INTRAMUSCULAR; INTRAVENOUS ONCE
Status: CANCELLED
Start: 2022-12-08 | End: 2022-12-08

## 2022-12-01 RX ADMIN — Medication 10 ML: at 14:10

## 2022-12-01 RX ADMIN — FUROSEMIDE 120 MG: 10 INJECTION, SOLUTION INTRAMUSCULAR; INTRAVENOUS at 14:11

## 2022-12-05 RX ORDER — SIMVASTATIN 20 MG
TABLET ORAL
Qty: 90 TABLET | Refills: 0 | Status: SHIPPED | OUTPATIENT
Start: 2022-12-05

## 2022-12-05 RX ORDER — SPIRONOLACTONE 50 MG/1
TABLET, FILM COATED ORAL
Qty: 270 TABLET | Refills: 0 | Status: SHIPPED | OUTPATIENT
Start: 2022-12-05

## 2022-12-08 ENCOUNTER — HOSPITAL ENCOUNTER (OUTPATIENT)
Dept: ONCOLOGY | Age: 52
Setting detail: INFUSION SERIES
Discharge: HOME | End: 2022-12-08
Payer: COMMERCIAL

## 2022-12-08 VITALS
TEMPERATURE: 97.4 F | SYSTOLIC BLOOD PRESSURE: 113 MMHG | HEART RATE: 65 BPM | DIASTOLIC BLOOD PRESSURE: 72 MMHG | RESPIRATION RATE: 16 BRPM

## 2022-12-08 DIAGNOSIS — I50.32 CHRONIC DIASTOLIC CONGESTIVE HEART FAILURE (HCC): ICD-10-CM

## 2022-12-08 DIAGNOSIS — I50.32 CHRONIC HEART FAILURE WITH PRESERVED EJECTION FRACTION (HCC): Primary | ICD-10-CM

## 2022-12-08 LAB
ANION GAP SERPL CALCULATED.3IONS-SCNC: 11 MMOL/L (ref 3–16)
BUN BLDV-MCNC: 64 MG/DL (ref 7–20)
CALCIUM SERPL-MCNC: 9.7 MG/DL (ref 8.3–10.6)
CHLORIDE BLD-SCNC: 90 MMOL/L (ref 99–110)
CO2: 33 MMOL/L (ref 21–32)
CREAT SERPL-MCNC: 1.4 MG/DL (ref 0.6–1.1)
GFR SERPL CREATININE-BSD FRML MDRD: 45 ML/MIN/{1.73_M2}
GLUCOSE BLD-MCNC: 234 MG/DL (ref 70–99)
POTASSIUM SERPL-SCNC: 4.1 MMOL/L (ref 3.5–5.1)
PRO-BNP: 269 PG/ML (ref 0–124)
SODIUM BLD-SCNC: 134 MMOL/L (ref 136–145)

## 2022-12-08 PROCEDURE — 2580000003 HC RX 258: Performed by: INTERNAL MEDICINE

## 2022-12-08 PROCEDURE — 96374 THER/PROPH/DIAG INJ IV PUSH: CPT

## 2022-12-08 PROCEDURE — 6360000002 HC RX W HCPCS: Performed by: INTERNAL MEDICINE

## 2022-12-08 PROCEDURE — 96375 TX/PRO/DX INJ NEW DRUG ADDON: CPT

## 2022-12-08 PROCEDURE — 99211 OFF/OP EST MAY X REQ PHY/QHP: CPT

## 2022-12-08 PROCEDURE — 80048 BASIC METABOLIC PNL TOTAL CA: CPT

## 2022-12-08 PROCEDURE — 83880 ASSAY OF NATRIURETIC PEPTIDE: CPT

## 2022-12-08 RX ORDER — SODIUM CHLORIDE 0.9 % (FLUSH) 0.9 %
5-40 SYRINGE (ML) INJECTION ONCE
Status: COMPLETED | OUTPATIENT
Start: 2022-12-08 | End: 2022-12-08

## 2022-12-08 RX ORDER — FUROSEMIDE 10 MG/ML
120 INJECTION INTRAMUSCULAR; INTRAVENOUS ONCE
Status: COMPLETED | OUTPATIENT
Start: 2022-12-08 | End: 2022-12-08

## 2022-12-08 RX ORDER — FUROSEMIDE 10 MG/ML
120 INJECTION INTRAMUSCULAR; INTRAVENOUS ONCE
Status: CANCELLED
Start: 2022-12-15 | End: 2022-12-15

## 2022-12-08 RX ORDER — SODIUM CHLORIDE 0.9 % (FLUSH) 0.9 %
5-40 SYRINGE (ML) INJECTION ONCE
Status: CANCELLED
Start: 2022-12-15 | End: 2022-12-15

## 2022-12-08 RX ADMIN — FUROSEMIDE 120 MG: 10 INJECTION, SOLUTION INTRAMUSCULAR; INTRAVENOUS at 14:12

## 2022-12-08 RX ADMIN — SODIUM CHLORIDE, PRESERVATIVE FREE 10 ML: 5 INJECTION INTRAVENOUS at 14:12

## 2022-12-13 ENCOUNTER — TELEPHONE (OUTPATIENT)
Dept: CARDIOLOGY CLINIC | Age: 52
End: 2022-12-13

## 2022-12-13 NOTE — TELEPHONE ENCOUNTER
----- Message from Mary Guevara MD sent at 12/8/2022  5:12 PM EST -----  Please call patient. Labs look okay. No changes.   ARETHA

## 2022-12-14 DIAGNOSIS — M06.00 SERONEGATIVE RHEUMATOID ARTHRITIS (HCC): ICD-10-CM

## 2022-12-15 ENCOUNTER — HOSPITAL ENCOUNTER (OUTPATIENT)
Dept: ONCOLOGY | Age: 52
Setting detail: INFUSION SERIES
Discharge: HOME | End: 2022-12-15
Payer: COMMERCIAL

## 2022-12-15 VITALS
OXYGEN SATURATION: 97 % | HEART RATE: 71 BPM | SYSTOLIC BLOOD PRESSURE: 134 MMHG | RESPIRATION RATE: 16 BRPM | DIASTOLIC BLOOD PRESSURE: 79 MMHG | TEMPERATURE: 97 F

## 2022-12-15 DIAGNOSIS — I50.32 CHRONIC DIASTOLIC CONGESTIVE HEART FAILURE (HCC): ICD-10-CM

## 2022-12-15 DIAGNOSIS — I50.32 CHRONIC HEART FAILURE WITH PRESERVED EJECTION FRACTION (HCC): Primary | ICD-10-CM

## 2022-12-15 PROCEDURE — 96375 TX/PRO/DX INJ NEW DRUG ADDON: CPT

## 2022-12-15 PROCEDURE — 99211 OFF/OP EST MAY X REQ PHY/QHP: CPT

## 2022-12-15 PROCEDURE — 6360000002 HC RX W HCPCS: Performed by: INTERNAL MEDICINE

## 2022-12-15 PROCEDURE — 2580000003 HC RX 258: Performed by: INTERNAL MEDICINE

## 2022-12-15 PROCEDURE — 96374 THER/PROPH/DIAG INJ IV PUSH: CPT

## 2022-12-15 RX ORDER — SODIUM CHLORIDE 0.9 % (FLUSH) 0.9 %
5-40 SYRINGE (ML) INJECTION ONCE
Status: COMPLETED | OUTPATIENT
Start: 2022-12-15 | End: 2022-12-15

## 2022-12-15 RX ORDER — FUROSEMIDE 10 MG/ML
120 INJECTION INTRAMUSCULAR; INTRAVENOUS ONCE
Status: CANCELLED
Start: 2022-12-22 | End: 2022-12-22

## 2022-12-15 RX ORDER — SODIUM CHLORIDE 0.9 % (FLUSH) 0.9 %
5-40 SYRINGE (ML) INJECTION ONCE
Status: CANCELLED
Start: 2022-12-22 | End: 2022-12-22

## 2022-12-15 RX ORDER — SULFASALAZINE 500 MG/1
TABLET ORAL
Qty: 180 TABLET | Refills: 0 | Status: SHIPPED | OUTPATIENT
Start: 2022-12-15

## 2022-12-15 RX ORDER — FUROSEMIDE 10 MG/ML
120 INJECTION INTRAMUSCULAR; INTRAVENOUS ONCE
Status: COMPLETED | OUTPATIENT
Start: 2022-12-15 | End: 2022-12-15

## 2022-12-15 RX ADMIN — SODIUM CHLORIDE, PRESERVATIVE FREE 10 ML: 5 INJECTION INTRAVENOUS at 14:19

## 2022-12-15 RX ADMIN — FUROSEMIDE 120 MG: 10 INJECTION, SOLUTION INTRAMUSCULAR; INTRAVENOUS at 14:13

## 2022-12-22 ENCOUNTER — HOSPITAL ENCOUNTER (OUTPATIENT)
Dept: ONCOLOGY | Age: 52
Setting detail: INFUSION SERIES
Discharge: HOME | End: 2022-12-22
Payer: COMMERCIAL

## 2022-12-22 VITALS
RESPIRATION RATE: 16 BRPM | TEMPERATURE: 96.8 F | DIASTOLIC BLOOD PRESSURE: 77 MMHG | SYSTOLIC BLOOD PRESSURE: 136 MMHG | HEART RATE: 65 BPM

## 2022-12-22 DIAGNOSIS — I50.32 CHRONIC HEART FAILURE WITH PRESERVED EJECTION FRACTION (HCC): Primary | ICD-10-CM

## 2022-12-22 DIAGNOSIS — I50.32 CHRONIC DIASTOLIC CONGESTIVE HEART FAILURE (HCC): ICD-10-CM

## 2022-12-22 PROCEDURE — 99211 OFF/OP EST MAY X REQ PHY/QHP: CPT

## 2022-12-22 PROCEDURE — 2580000003 HC RX 258: Performed by: INTERNAL MEDICINE

## 2022-12-22 PROCEDURE — 6360000002 HC RX W HCPCS: Performed by: INTERNAL MEDICINE

## 2022-12-22 PROCEDURE — 96374 THER/PROPH/DIAG INJ IV PUSH: CPT

## 2022-12-22 RX ORDER — FUROSEMIDE 10 MG/ML
120 INJECTION INTRAMUSCULAR; INTRAVENOUS ONCE
Status: COMPLETED | OUTPATIENT
Start: 2022-12-22 | End: 2022-12-22

## 2022-12-22 RX ORDER — FUROSEMIDE 10 MG/ML
120 INJECTION INTRAMUSCULAR; INTRAVENOUS ONCE
Status: CANCELLED
Start: 2022-12-29 | End: 2022-12-29

## 2022-12-22 RX ORDER — SODIUM CHLORIDE 0.9 % (FLUSH) 0.9 %
5-40 SYRINGE (ML) INJECTION ONCE
Status: COMPLETED | OUTPATIENT
Start: 2022-12-22 | End: 2022-12-22

## 2022-12-22 RX ORDER — SODIUM CHLORIDE 0.9 % (FLUSH) 0.9 %
5-40 SYRINGE (ML) INJECTION ONCE
Status: CANCELLED
Start: 2022-12-29 | End: 2022-12-29

## 2022-12-22 RX ADMIN — FUROSEMIDE 120 MG: 10 INJECTION, SOLUTION INTRAMUSCULAR; INTRAVENOUS at 14:13

## 2022-12-22 RX ADMIN — Medication 10 ML: at 14:13

## 2022-12-22 NOTE — PROGRESS NOTES
Pt to Dept for Lasix IVP. IV Started, Lasix 120 mg IVP given over 6 minutes. Pt eliana with no adverse reaction noted.  Denied need for printed AVS.Pt to return next week for same treatment

## 2022-12-27 ENCOUNTER — TELEPHONE (OUTPATIENT)
Dept: ENDOCRINOLOGY | Age: 52
End: 2022-12-27

## 2022-12-27 NOTE — TELEPHONE ENCOUNTER
[facility] [employer] [facility]

## 2022-12-29 ENCOUNTER — HOSPITAL ENCOUNTER (OUTPATIENT)
Dept: ONCOLOGY | Age: 52
Setting detail: INFUSION SERIES
Discharge: HOME | End: 2022-12-29
Payer: COMMERCIAL

## 2022-12-29 VITALS
RESPIRATION RATE: 12 BRPM | TEMPERATURE: 97 F | HEART RATE: 66 BPM | SYSTOLIC BLOOD PRESSURE: 121 MMHG | DIASTOLIC BLOOD PRESSURE: 85 MMHG

## 2022-12-29 DIAGNOSIS — I50.32 CHRONIC HEART FAILURE WITH PRESERVED EJECTION FRACTION (HCC): Primary | ICD-10-CM

## 2022-12-29 DIAGNOSIS — I50.32 CHRONIC DIASTOLIC CONGESTIVE HEART FAILURE (HCC): ICD-10-CM

## 2022-12-29 PROCEDURE — 99211 OFF/OP EST MAY X REQ PHY/QHP: CPT

## 2022-12-29 PROCEDURE — 2580000003 HC RX 258: Performed by: INTERNAL MEDICINE

## 2022-12-29 PROCEDURE — 6360000002 HC RX W HCPCS: Performed by: INTERNAL MEDICINE

## 2022-12-29 PROCEDURE — 96374 THER/PROPH/DIAG INJ IV PUSH: CPT

## 2022-12-29 RX ORDER — FUROSEMIDE 10 MG/ML
120 INJECTION INTRAMUSCULAR; INTRAVENOUS ONCE
Status: CANCELLED
Start: 2022-12-29 | End: 2022-12-29

## 2022-12-29 RX ORDER — FUROSEMIDE 10 MG/ML
120 INJECTION INTRAMUSCULAR; INTRAVENOUS ONCE
Status: COMPLETED | OUTPATIENT
Start: 2022-12-29 | End: 2022-12-29

## 2022-12-29 RX ORDER — SODIUM CHLORIDE 0.9 % (FLUSH) 0.9 %
5-40 SYRINGE (ML) INJECTION ONCE
Status: COMPLETED | OUTPATIENT
Start: 2022-12-29 | End: 2022-12-29

## 2022-12-29 RX ORDER — SODIUM CHLORIDE 0.9 % (FLUSH) 0.9 %
5-40 SYRINGE (ML) INJECTION ONCE
Status: CANCELLED
Start: 2022-12-29 | End: 2022-12-29

## 2022-12-29 RX ADMIN — SODIUM CHLORIDE, PRESERVATIVE FREE 20 ML: 5 INJECTION INTRAVENOUS at 14:24

## 2022-12-29 RX ADMIN — FUROSEMIDE 120 MG: 10 INJECTION, SOLUTION INTRAMUSCULAR; INTRAVENOUS at 14:19

## 2022-12-29 NOTE — PROGRESS NOTES
Pt ambulatory to infusion center for Lasix IVP. VSS. Pt denies acute complaints, but spouse reports the Lasix only helps the pt for a few days. Last scheduled treatment is next week. Pt's next appointment with Dr. Leatha Jauregui is in March. Advised pt/spouse to call Dr. Leatha Jauregui to schedule earlier appointment to discuss above and possible need for extended v/s alternative treatment. Pt/spouse v/u. IV access obtained. IVP Lasix administered; 120 mg IVP given over 20 minutes. Pt reports dizziness with administration; BP stable throughout. IV removed. Pt/spouse denied need for printed AVS. Pt to return next week for same treatment.

## 2022-12-29 NOTE — DISCHARGE INSTRUCTIONS
furosemide (oral/injection)  Pronunciation:  yenny bloom  Brand:  Lasix  What is the most important information I should know about furosemide? You should not use this medicine if you are unable to urinate. Do not take more than your recommended dose. High doses of furosemide may cause irreversible hearing loss. What is furosemide? Furosemide is a loop diuretic (water pill) that prevents your body from absorbing too much salt. This allows the salt to instead be passed in your urine. Furosemide is used to treat fluid retention (edema) in people with congestive heart failure, liver disease, or a kidney disorder such as nephrotic syndrome. Furosemide is also used to treat high blood pressure (hypertension). Furosemide may also be used for purposes not listed in this medication guide. What should I discuss with my healthcare provider before taking furosemide? You should not use furosemide if you are allergic to it, or if you are unable to urinate. Tell your doctor if you have ever had:  kidney disease;  enlarged prostate, bladder obstruction, urination problems;  cirrhosis or other liver disease;  an electrolyte imbalance (such as low levels of potassium or magnesium in your blood);  gout;  lupus;  diabetes; or  a sulfa drug allergy. Tell your doctor if you have an MRI (magnetic resonance imaging) or any type of scan using a radioactive dye that is injected into your veins. Both contrast dyes and furosemide can harm your kidneys. It is not known whether this medicine will harm an unborn baby. Tell your doctor if you are pregnant or plan to become pregnant. It may not be safe to breastfeed while using this medicine. Ask your doctor about any risk. Furosemide may slow breast milk production. How should I take furosemide? Follow all directions on your prescription label and read all medication guides or instruction sheets. Your doctor may occasionally change your dose.  Use the medicine exactly as directed. Furosemide oral is taken by mouth. Furosemide injection is injected into a muscle or given as an infusion into a vein. A healthcare provider will give you this injection if you are unable to take the medicine by mouth. You may receive your first dose in a hospital or clinic setting if you have severe liver disease. Do not take more than your recommended dose. High doses of furosemide may cause irreversible hearing loss. Measure liquid medicine carefully. Use the dosing syringe provided, or use a medicine dose-measuring device (not a kitchen spoon). Furosemide doses are based on weight in children. Your child's dose needs may change if the child gains or loses weight. Furosemide will make you urinate more often and you may get dehydrated easily. Follow your doctor's instructions about using potassium supplements or getting enough salt and potassium in your diet. Your blood pressure will need to be checked often and you may need other medical tests. If you have high blood pressure, keep using this medicine even if you feel well. High blood pressure often has no symptoms. You may need to use blood pressure medicine for the rest of your life. If you need surgery, tell the surgeon ahead of time that you are using furosemide. Store at room temperature away from moisture, heat, and light. Throw away any unused oral liquid  after 90 days. What happens if I miss a dose? Furosemide is sometimes used only once, so you may not be on a dosing schedule. If you are using the medication regularly, take the medicine as soon as you can, but skip the missed dose if it is almost time for your next dose. Do not take two doses at one time. What happens if I overdose? Seek emergency medical attention or call the Poison Help line at 1-799.290.5591. Overdose symptoms may include feeling very thirsty or hot, heavy sweating, hot and dry skin, extreme weakness, or fainting.   What should I avoid while taking furosemide? Avoid getting up too fast from a sitting or lying position, or you may feel dizzy. Avoid becoming dehydrated. Follow your doctor's instructions about the type and amount of liquids you should drink while you are taking furosemide. Drinking alcohol with this medicine can cause side effects. If you have high blood pressure, ask a doctor or pharmacist before taking any medicines that can raise your blood pressure, such as diet pills or cough-and-cold medicine. What are the possible side effects of furosemide? Get emergency medical help if you have signs of an allergic reaction (hives, difficult breathing, swelling in your face or throat) or a severe skin reaction (fever, sore throat, burning in your eyes, skin pain, red or purple skin rash that spreads and causes blistering and peeling). Call your doctor at once if you have:  a light-headed feeling, like you might pass out;  ringing in your ears, hearing loss;  muscle spasms or contractions;  pale skin, easy bruising, unusual bleeding;  high blood sugar --increased thirst, increased urination, dry mouth, fruity breath odor;  kidney problems --little or no urination, swelling in your feet or ankles, feeling tired or short of breath;  signs of liver or pancreas problems --loss of appetite, upper stomach pain (that may spread to your back), nausea or vomiting, dark urine, jaundice (yellowing of the skin or eyes); or  signs of an electrolyte imbalance --dry mouth, thirst, weakness, drowsiness, feeling jittery or unsteady, vomiting, irregular heartbeats, fluttering in your chest, numbness or tingling, muscle cramps, muscle weakness or limp feeling. Common side effects may include:  diarrhea, constipation, loss of appetite;  numbness or tingling;  headache, dizziness; or  blurred vision. This is not a complete list of side effects and others may occur. Call your doctor for medical advice about side effects.  You may report side effects to FDA at 1-800-FDA-1088. What other drugs will affect furosemide? Sometimes it is not safe to use certain medications at the same time. Some drugs can affect your blood levels of other drugs you take, which may increase side effects or make the medications less effective. If you also take sucralfate, take your furosemide dose 2 hours before or 2 hours after you take sucralfate. Tell your doctor about all your other medicines, especially:  another diuretic, especially ethacrynic acid;  chloral hydrate;  lithium;  phenytoin;  an injected antibiotic;  cancer medicine, such as cisplatin;  heart or blood pressure medicine; or  salicylates such as aspirin, Nuprin Backache Caplet, Kaopectate, KneeRelief, Pamprin Cramp Formula, Pepto-Bismol, Tricosal, Trilisate, and others. This list is not complete. Other drugs may affect furosemide, including prescription and over-the-counter medicines, vitamins, and herbal products. Not all possible drug interactions are listed here. Where can I get more information? Your pharmacist can provide more information about furosemide. Remember, keep this and all other medicines out of the reach of children, never share your medicines with others, and use this medication only for the indication prescribed. Every effort has been made to ensure that the information provided by [facility] [address] is accurate, up-to-date, and complete, but no guarantee is made to that effect. Drug information contained herein may be time sensitive. [facility] information has been compiled for use by healthcare practitioners and consumers in the [place] and therefore [facility] does not warrant that uses outside of the [place] are appropriate, unless specifically indicated otherwise. [facility]'s drug information does not endorse drugs, diagnose patients or recommend therapy.  [facility]'s drug information is an informational resource designed to assist licensed healthcare practitioners in caring for their patients and/or to serve consumers viewing this service as a supplement to, and not a substitute for, the expertise, skill, knowledge and judgment of healthcare practitioners. The absence of a warning for a given drug or drug combination in no way should be construed to indicate that the drug or drug combination is safe, effective or appropriate for any given patient. [facility] does not assume any responsibility for any aspect of healthcare administered with the aid of information [facility] provides. The information contained herein is not intended to cover all possible uses, directions, precautions, warnings, drug interactions, allergic reactions, or adverse effects. If you have questions about the drugs you are taking, check with your doctor, nurse or pharmacist.  Copyright 9980-0551 [address]. Version: 16.01. Revision date: 5/22/2019. Care instructions adapted under license by [facility] ([facility]). If you have questions about a medical condition or this instruction, always ask your healthcare professional. [place] [address] any warranty or liability for your use of this information.

## 2023-01-04 ENCOUNTER — TELEPHONE (OUTPATIENT)
Dept: CARDIOLOGY CLINIC | Age: 53
End: 2023-01-04

## 2023-01-04 ENCOUNTER — OFFICE VISIT (OUTPATIENT)
Dept: INTERNAL MEDICINE CLINIC | Age: 53
End: 2023-01-04
Payer: COMMERCIAL

## 2023-01-04 ENCOUNTER — CLINICAL DOCUMENTATION (OUTPATIENT)
Dept: ONCOLOGY | Age: 53
End: 2023-01-04

## 2023-01-04 VITALS
BODY MASS INDEX: 34.84 KG/M2 | HEART RATE: 76 BPM | SYSTOLIC BLOOD PRESSURE: 114 MMHG | OXYGEN SATURATION: 94 % | WEIGHT: 203 LBS | DIASTOLIC BLOOD PRESSURE: 70 MMHG

## 2023-01-04 DIAGNOSIS — E89.89 LYMPHEDEMA OF UPPER EXTREMITY FOLLOWING LYMPHADENECTOMY: ICD-10-CM

## 2023-01-04 DIAGNOSIS — I89.0 LYMPHEDEMA OF UPPER EXTREMITY FOLLOWING LYMPHADENECTOMY: ICD-10-CM

## 2023-01-04 DIAGNOSIS — I50.32 CHRONIC HEART FAILURE WITH PRESERVED EJECTION FRACTION (HCC): ICD-10-CM

## 2023-01-04 DIAGNOSIS — R06.02 SHORTNESS OF BREATH: Primary | ICD-10-CM

## 2023-01-04 DIAGNOSIS — R39.11 URINARY HESITANCY: ICD-10-CM

## 2023-01-04 DIAGNOSIS — I89.0 LYMPHEDEMA: ICD-10-CM

## 2023-01-04 PROCEDURE — G8417 CALC BMI ABV UP PARAM F/U: HCPCS | Performed by: INTERNAL MEDICINE

## 2023-01-04 PROCEDURE — 99214 OFFICE O/P EST MOD 30 MIN: CPT | Performed by: INTERNAL MEDICINE

## 2023-01-04 PROCEDURE — 3078F DIAST BP <80 MM HG: CPT | Performed by: INTERNAL MEDICINE

## 2023-01-04 PROCEDURE — G8482 FLU IMMUNIZE ORDER/ADMIN: HCPCS | Performed by: INTERNAL MEDICINE

## 2023-01-04 PROCEDURE — 3074F SYST BP LT 130 MM HG: CPT | Performed by: INTERNAL MEDICINE

## 2023-01-04 PROCEDURE — 3017F COLORECTAL CA SCREEN DOC REV: CPT | Performed by: INTERNAL MEDICINE

## 2023-01-04 PROCEDURE — 1036F TOBACCO NON-USER: CPT | Performed by: INTERNAL MEDICINE

## 2023-01-04 PROCEDURE — G8427 DOCREV CUR MEDS BY ELIG CLIN: HCPCS | Performed by: INTERNAL MEDICINE

## 2023-01-04 RX ORDER — UREA 40 G/100G
LOTION TOPICAL
Qty: 325 ML | Refills: 5 | Status: SHIPPED | OUTPATIENT
Start: 2023-01-04

## 2023-01-04 RX ORDER — PETROLATUM 42 G/100G
OINTMENT TOPICAL
Qty: 454 G | Refills: 5 | Status: SHIPPED | OUTPATIENT
Start: 2023-01-04 | End: 2023-01-05 | Stop reason: SDUPTHER

## 2023-01-04 NOTE — Clinical Note
XU Foster I saw Mrs. Bonner on Wednesday. She was short of breath and had increased swelling. She was scheduled for Lasix infusion on Thursday. I did have her take an additional 50 mg of Demadex on Wednesday. She reports doing well the first 4 days after her Lasix infusion and then has progressing  weight gain, swelling, and shortness of breath until her next infusion. I was wondering if you had any suggestions for her. Thanks for your help!  Jessica Dallas

## 2023-01-04 NOTE — TELEPHONE ENCOUNTER
Deysi called stating that the Pt last weekly infusion of lasix is 01/05. Per Deysi does ARETHA want the Pt to continue his treatment if so she is needing a new order.   Please advise

## 2023-01-04 NOTE — PROGRESS NOTES
Patient: Primo Stout is a 47 y.o. female who presents today with the following Chief Complaint(s):  Chief Complaint   Patient presents with    Follow-up       HPI    Here today for follow up.  helps provide history. Declines . Has been not doing well. Has been having increased swelling, decreased exercise tolerance. Getting very SOB with minimal activity with decreasing oxygen sats. Has been worse over the past 2-3 weeks. IV Lasix does help but does not seem to last as long as it used to last. Has only been lasting about 4 days before weight starts to increase. Wondering is she can increase torsemide to 100 mg BID from 100 mg am/50 mg pm.     Last echo in October with EF 55-60%. Struggles to breathe while she is cooking as she does not cook with her oxygen on. Hand swelling looks worse today. Has a compression machine that she uses. Has a crack in her right heel. Painful. Still struggling with urinary hesitancy. Did reach out to schedule with Dr. Manuel Chaudhry but she was on maternity leave and they have not heard back to r/s. Lost phone #. Sometimes has flank pain on the left.      Labs Renal Latest Ref Rng & Units 1/5/2023 12/8/2022 11/10/2022 10/13/2022 9/28/2022   BUN 7 - 20 mg/dL 71(H) 64(H) 57(H) 71(H) 44(H)   Cr 0.6 - 1.1 mg/dL 1.6(H) 1.4(H) 1.7(H) 1.4(H) 1.5(H)   K 3.5 - 5.1 mmol/L 4.5 4.1 3.9 4.5 4.6   Na 136 - 145 mmol/L 132(L) 134(L) 135(L) 132(L) 135(L)         Allergies   Allergen Reactions    Iv Dye [Iodides] Anaphylaxis     allergic to ct scan dye, not the MRI    Diazepam Other (See Comments)    Insulin Glargine Other (See Comments)     High blood sugar     Trazodone And Nefazodone Other (See Comments)     Makes patient feel very sluggish      Past Medical History:   Diagnosis Date    Anxiety     Anxiety and depression     Arthritis     not sure of specific type    Asthma     CAD (coronary artery disease)     Cancer ([facility] 2007    left breast    Cerebral artery occlusion with cerebral infarction ([facility]     2000, 2001    CHF (congestive heart failure) ([facility] 2018    Chronic kidney disease     renal insufficency/to see Dr Rosana Scott    Chronic pain     Constipation     COPD (chronic obstructive pulmonary disease) ([facility]     Depression     Diabetes mellitus ([facility]     Diabetic polyneuropathy associated with type 2 diabetes mellitus ([facility] 2/2/2018    Dysthymia 2/2/2018    ESBL (extended spectrum beta-lactamase) producing bacteria infection 03/14/2018    urine    Fibromyalgia     Gastric ulcer, unspecified as acute or chronic, without mention of hemorrhage, perforation, or obstruction     GERD (gastroesophageal reflux disease)     Gout     HIGH CHOLESTEROL     Hypertension     Hypothyroidism     Pneumonia 3/25/2020    Pyelonephritis     Severe persistent asthma without complication 8/3/5228    Thyroid disease     TIA (transient ischemic attack)     with occasional left leg and hand weakness      Past Surgical History:   Procedure Laterality Date    BREAST SURGERY      left mastectomy    CARPAL TUNNEL RELEASE      Bilateral    FINGER CONTRACTURE SURGERY      HYSTERECTOMY (CERVIX STATUS UNKNOWN)  2005    USO    MASTECTOMY      TONSILLECTOMY      UPPER GASTROINTESTINAL ENDOSCOPY  2019    stretched esophagous       Social History     Socioeconomic History    Marital status:      Spouse name: Mayda Arthur    Number of children: 3    Years of education: Not on file    Highest education level: Not on file   Occupational History    Occupation: disabled   Tobacco Use    Smoking status: Never    Smokeless tobacco: Never   Vaping Use    Vaping Use: Never used   Substance and Sexual Activity    Alcohol use: No    Drug use: No    Sexual activity: Not Currently   Other Topics Concern    Not on file   Social History Narrative    Not on file     Social Determinants of Health     Financial Resource Strain: Low Risk     Difficulty of Paying Living Expenses: Not hard at all   Food Insecurity: No Food Insecurity    Worried About Running Out of Food in the Last Year: Never true    Ran Out of Food in the Last Year: Never true   Transportation Needs: Not on file   Physical Activity: Not on file   Stress: Not on file   Social Connections: Not on file   Intimate Partner Violence: Not on file   Housing Stability: Not on file     Family History   Problem Relation Age of Onset    Asthma Other     Cancer Other     Depression Other     Diabetes Other     Hypertension Other     High Cholesterol Other     Migraines Other     Heart Attack Father 72         of MI    High Blood Pressure Mother     Diabetes Mother         Outpatient Medications Prior to Visit   Medication Sig Dispense Refill    sulfaSALAzine (AZULFIDINE) 500 MG tablet TAKE 1 TABLET BY MOUTH TWO TIMES A  tablet 0    simvastatin (ZOCOR) 20 MG tablet TAKE 1 TABLET BY MOUTH one time daily at night 90 tablet 0    spironolactone (ALDACTONE) 50 MG tablet TAKE 2 TABLETS BY MOUTH IN THE MORNING and 1 tablet in the evening 270 tablet 0    torsemide (DEMADEX) 100 MG tablet TAKE 1 TABLET BY MOUTH IN THE MORNING AND 1/2 TABLET IN THE EVENING. (Patient taking differently: 100 mg in the morning and at bedtime) 135 tablet 0    omeprazole (PRILOSEC) 20 MG delayed release capsule TAKE 1 CAPSULE BY MOUTH TWO TIMES A DAY 60 capsule 5    FEROSUL 325 (65 Fe) MG tablet TAKE 1 TABLET BY MOUTH TWO TIMES A DAY WITH MEALS 180 tablet 0    nitroGLYCERIN (NITROSTAT) 0.4 MG SL tablet PLACE ONE TABLET UNDER TONGUE AS NEEDED FOR CHEST PAIN, MAY REPEAT EVERY 5 MINUTES AS NEEDED UP TO A MAX OF 3 TABLETS.   IF NO RELIEF AFTER 1 DOSE, CALL 911. 25 tablet 2    metoprolol tartrate (LOPRESSOR) 25 MG tablet TAKE 1 TABLET BY MOUTH TWO TIMES A  tablet 3    Febuxostat 80 MG TABS TAKE 1 TABLET BY MOUTH EVERY DAY 90 tablet 1    leflunomide (ARAVA) 20 MG tablet TAKE 1 TABLET BY MOUTH EVERY DAY 90 tablet 0    SM SENNA-S 8.6-50 MG per tablet TAKE 2 TABLETS BY MOUTH TWO TIMES A  tablet 5    solifenacin (VESICARE) 10 MG tablet Take 1 tablet by mouth daily 90 tablet 3    meclizine (ANTIVERT) 25 MG tablet Take 1 tablet by mouth 3 times daily as needed for Dizziness 30 tablet 2    montelukast (SINGULAIR) 10 MG tablet Take 1 tablet by mouth nightly 30 tablet 5    TRESIBA FLEXTOUCH 200 UNIT/ML SOPN INJECT 200 UNITS SUBCUTANEOUSLY ONE TIME DAILY 54 mL 3    lubiprostone (AMITIZA) 24 MCG capsule TAKE 1 CAPSULE BY MOUTH TWO TIMES A DAY WITH MEALS 60 capsule 5    Continuous Blood Gluc Sensor (FREESTYLE EMERALD 2 SENSOR) MISC Change every 14 days 2 each 5    insulin aspart (NOVOLOG FLEXPEN) 100 UNIT/ML injection pen inject 30 to 50 units into the skin three time daily before meals 60 mL 3    Insulin Pen Needle (B-D UF III MINI PEN NEEDLES) 31G X 5 MM MISC use five times daily with insulin 200 each 5    levothyroxine (SYNTHROID) 150 MCG tablet TAKE 1 TABLET BY MOUTH IN THE MORNING before breakfast 90 tablet 1    vitamin D3 (CHOLECALCIFEROL) 25 MCG (1000 UT) TABS tablet TAKE 3 TABLETS BY MOUTH ONE TIME A DAY 90 tablet 5    STEGLATRO 5 MG TABS TAKE 1 TABLET BY MOUTH EVERY DAY 90 tablet 1    Blood Glucose Monitoring Suppl (ONETOUCH VERIO) w/Device KIT Use to check glucose 4 times daily. 1 kit 0    Lidocaine 5 % CREA Apply to feet QID prn pain for peripheral neuropathy 30 g 5    butalbital-acetaminophen-caffeine (FIORICET, ESGIC) -40 MG per tablet Take 1 tablet by mouth every 6 hours as needed for Headaches 30 tablet 1    rosuvastatin (CRESTOR) 10 MG tablet Take 1 tablet by mouth nightly 90 tablet 3    ranolazine (RANEXA) 500 MG extended release tablet TAKE 1 TABLET BY MOUTH TWO TIMES A  tablet 3    Compression Stockings MISC by Does not apply route Zippered stockings for daily use on lower extremities (do not wear at night). 20-30mmHg.  1 each 1    ONETOUCH VERIO strip use to test blood sugar 5 times daily 200 strip 5    metOLazone (ZAROXOLYN) 2.5 MG tablet TAKE 1 TABLET BY MOUTH TWO TIMES A WEEK AS NEEDED FOR WEIGHT OVER 180 POUNDS. DO NOT TAKE 2 DAYS IN A ROW. (Patient taking differently: Take 2.5 mg by mouth twice a week) 24 tablet 0    Continuous Blood Gluc  (FREESTYLE EMERALD 2 READER) MINDY To check glucose levels 1 each 0    albuterol (PROVENTIL) (2.5 MG/3ML) 0.083% nebulizer solution Take 3 mLs by nebulization every 6 hours as needed for Wheezing or Shortness of Breath 120 each 1    polyethylene glycol (GLYCOLAX) 17 GM/SCOOP powder TAKE 17 GRAMS (1 CAPFUL) BY MOUTH NIGHTLY 510 g 0    LINZESS 290 MCG CAPS capsule TAKE 1 CAPSULE BY MOUTH ONE TIME A DAY FOR 90 DAYS      REXULTI 2 MG TABS tablet Take 2 mg by mouth at bedtime       methocarbamol (ROBAXIN-750) 750 MG tablet Take 1 tablet by mouth 3 times daily as needed (pain and spasm) 30 tablet 0    nystatin (MYCOSTATIN) 512070 UNIT/ML suspension Take 5 mLs by mouth 4 times daily 500 mL 1    traZODone (DESYREL) 50 MG tablet Take 1 tablet by mouth nightly Take it on the day of sleep study 1 tablet 0    loratadine (CLARITIN) 10 MG tablet TAKE 1 TABLET BY MOUTH ONE TIME A DAY  30 tablet 5    ketoconazole (NIZORAL) 2 % shampoo Apply topically daily as needed. 120 mL 1    diazePAM (VALIUM) 5 MG tablet Take 5 mg by mouth 2 times daily as needed for Anxiety. OXYGEN Inhale 2 L into the lungs nightly Sometimes with activity      oxyCODONE (OXYCONTIN) 20 MG extended release tablet Take 20 mg by mouth every 12 hours. aspirin 81 MG EC tablet Take 81 mg by mouth daily      benztropine (COGENTIN) 1 MG tablet Take 1 mg by mouth nightly       oxyCODONE-acetaminophen (PERCOCET)  MG per tablet Take 1 tablet by mouth every 4 hours as needed for Pain . Earliest Fill Date: 6/8/17 100 tablet 0    duloxetine (CYMBALTA) 60 MG capsule Take 60 mg by mouth 2 times daily.         pregabalin (LYRICA) 75 MG capsule TAKE 1 CAPSULE BY MOUTH TWO TIMES A DAY 60 capsule 2    Urea 40 % LOTN Apply to feet nightly and cover with Aquaphor 325 mL 5 mineral oil-hydrophilic petrolatum (HYDROPHOR) ointment Apply topically as needed to hands and feet. 454 g 5     No facility-administered medications prior to visit. Patient'spast medical history, surgical history, family history, medications,  and allergies  were all reviewed and updated as appropriate today. Review of Systems   Constitutional:  Negative for unexpected weight change. Respiratory:  Positive for shortness of breath. Negative for cough, chest tightness and wheezing. Cardiovascular:  Positive for leg swelling. Negative for chest pain. /70   Pulse 76   Wt 203 lb (92.1 kg)   SpO2 94%   BMI 34.84 kg/m²   Physical Exam  Vitals and nursing note reviewed. Constitutional:       Appearance: She is well-developed. She is not toxic-appearing. HENT:      Head: Normocephalic. Right Ear: Tympanic membrane, ear canal and external ear normal.      Left Ear: Tympanic membrane, ear canal and external ear normal.      Mouth/Throat:      Pharynx: No oropharyngeal exudate or posterior oropharyngeal erythema. Eyes:      General: No scleral icterus. Extraocular Movements: Extraocular movements intact. Conjunctiva/sclera: Conjunctivae normal.      Pupils: Pupils are equal, round, and reactive to light. Neck:      Thyroid: No thyroid mass or thyromegaly. Vascular: No carotid bruit. Cardiovascular:      Rate and Rhythm: Normal rate and regular rhythm. Heart sounds: Normal heart sounds. No murmur heard. Comments: 1+ swelling right hand   4+ swelling left hand and arm. Pulmonary:      Effort: Pulmonary effort is normal.      Breath sounds: Normal breath sounds. Musculoskeletal:      Right lower le+ Edema present. Left lower le+ Edema present. Lymphadenopathy:      Cervical: No cervical adenopathy. Neurological:      General: No focal deficit present. Mental Status: She is alert and oriented to person, place, and time.    Psychiatric: Mood and Affect: Mood normal.         Behavior: Behavior normal. Behavior is cooperative. ASSESSMENT/PLAN:    Problem List Items Addressed This Visit       Chronic heart failure with preserved ejection fraction (HCC) (Chronic)      Shortness of breath is worsening. She does better with for about 4 days following her Lasix infusion and then she slowly gets worse until her next infusion is due. She is scheduled for Lasix infusion tomorrow but is struggling today. Advised patient to take an additional dose of torsemide 50 mg today. Lymphedema      Lower extremities. Right worse than left. Order provided for compression stockings. Relevant Medications    Elastic Bandages & Supports (MEDICAL COMPRESSION STOCKINGS) MISC    Lymphedema of upper extremity following lymphadenectomy      Order provided to patient for compression sleeve and compression glove. Advised patient's  to check with [facility]'s and [facility].         Relevant Medications    Compression Sleeves    Elastic Bandages & Supports (COMPRESSION & SUPPORT GLOVES L) MISC    Shortness of breath - Primary     Patient with worsening shortness of breath. Seems to do better the first 4 days following her Lasix infusion and then really struggle the next 3 days. Patient advised to take an additional 50 mg of torsemide today. She is scheduled for an infusion tomorrow. Patient does follow with Dr. Laurita Engle for cardiology and Dr. Aditi Michaels for pulmonology. Advised patient to increase oxygen when she is active. Check CBC to rule out anemia as a contributing factor. Relevant Orders    CBC with Auto Differential (Completed)    Urinary hesitancy      Patient encouraged to follow-up with urology. Referral reprinted today as the phone number has been misplaced.          Relevant Orders    AMANDA - Ej Wesley MD, Urology, [facility]       Current Outpatient Medications   Medication Sig Dispense Refill    Compression Sleeves Left arm 2 each 0    Elastic Bandages & Supports (COMPRESSION & SUPPORT GLOVES L) MISC 1 each by Does not apply route daily Left hand 2 each 0    Elastic Bandages & Supports (MEDICAL COMPRESSION STOCKINGS) MISC 2 each by Does not apply route daily On am/off HS. 2 each 0    Urea 40 % LOTN Apply to feet nightly and cover with Aquaphor 325 mL 5    mupirocin (BACTROBAN) 2 % ointment Apply topically 3 times daily prn open wound. 30 g 1    sulfaSALAzine (AZULFIDINE) 500 MG tablet TAKE 1 TABLET BY MOUTH TWO TIMES A  tablet 0    simvastatin (ZOCOR) 20 MG tablet TAKE 1 TABLET BY MOUTH one time daily at night 90 tablet 0    spironolactone (ALDACTONE) 50 MG tablet TAKE 2 TABLETS BY MOUTH IN THE MORNING and 1 tablet in the evening 270 tablet 0    torsemide (DEMADEX) 100 MG tablet TAKE 1 TABLET BY MOUTH IN THE MORNING AND 1/2 TABLET IN THE EVENING. (Patient taking differently: 100 mg in the morning and at bedtime) 135 tablet 0    omeprazole (PRILOSEC) 20 MG delayed release capsule TAKE 1 CAPSULE BY MOUTH TWO TIMES A DAY 60 capsule 5    FEROSUL 325 (65 Fe) MG tablet TAKE 1 TABLET BY MOUTH TWO TIMES A DAY WITH MEALS 180 tablet 0    nitroGLYCERIN (NITROSTAT) 0.4 MG SL tablet PLACE ONE TABLET UNDER TONGUE AS NEEDED FOR CHEST PAIN, MAY REPEAT EVERY 5 MINUTES AS NEEDED UP TO A MAX OF 3 TABLETS.   IF NO RELIEF AFTER 1 DOSE, CALL 911. 25 tablet 2    metoprolol tartrate (LOPRESSOR) 25 MG tablet TAKE 1 TABLET BY MOUTH TWO TIMES A  tablet 3    Febuxostat 80 MG TABS TAKE 1 TABLET BY MOUTH EVERY DAY 90 tablet 1    leflunomide (ARAVA) 20 MG tablet TAKE 1 TABLET BY MOUTH EVERY DAY 90 tablet 0    SM SENNA-S 8.6-50 MG per tablet TAKE 2 TABLETS BY MOUTH TWO TIMES A  tablet 5    solifenacin (VESICARE) 10 MG tablet Take 1 tablet by mouth daily 90 tablet 3    meclizine (ANTIVERT) 25 MG tablet Take 1 tablet by mouth 3 times daily as needed for Dizziness 30 tablet 2    montelukast (SINGULAIR) 10 MG tablet Take 1 tablet by mouth nightly 30 tablet 5    TRESIBA FLEXTOUCH 200 UNIT/ML SOPN INJECT 200 UNITS SUBCUTANEOUSLY ONE TIME DAILY 54 mL 3    lubiprostone (AMITIZA) 24 MCG capsule TAKE 1 CAPSULE BY MOUTH TWO TIMES A DAY WITH MEALS 60 capsule 5    Continuous Blood Gluc Sensor (FREESTYLE EMERALD 2 SENSOR) MISC Change every 14 days 2 each 5    insulin aspart (NOVOLOG FLEXPEN) 100 UNIT/ML injection pen inject 30 to 50 units into the skin three time daily before meals 60 mL 3    Insulin Pen Needle (B-D UF III MINI PEN NEEDLES) 31G X 5 MM MISC use five times daily with insulin 200 each 5    levothyroxine (SYNTHROID) 150 MCG tablet TAKE 1 TABLET BY MOUTH IN THE MORNING before breakfast 90 tablet 1    vitamin D3 (CHOLECALCIFEROL) 25 MCG (1000 UT) TABS tablet TAKE 3 TABLETS BY MOUTH ONE TIME A DAY 90 tablet 5    STEGLATRO 5 MG TABS TAKE 1 TABLET BY MOUTH EVERY DAY 90 tablet 1    Blood Glucose Monitoring Suppl (ONETOUCH VERIO) w/Device KIT Use to check glucose 4 times daily. 1 kit 0    Lidocaine 5 % CREA Apply to feet QID prn pain for peripheral neuropathy 30 g 5    butalbital-acetaminophen-caffeine (FIORICET, ESGIC) -40 MG per tablet Take 1 tablet by mouth every 6 hours as needed for Headaches 30 tablet 1    rosuvastatin (CRESTOR) 10 MG tablet Take 1 tablet by mouth nightly 90 tablet 3    ranolazine (RANEXA) 500 MG extended release tablet TAKE 1 TABLET BY MOUTH TWO TIMES A  tablet 3    Compression Stockings MISC by Does not apply route Zippered stockings for daily use on lower extremities (do not wear at night). 20-30mmHg. 1 each 1    ONETOUCH VERIO strip use to test blood sugar 5 times daily 200 strip 5    metOLazone (ZAROXOLYN) 2.5 MG tablet TAKE 1 TABLET BY MOUTH TWO TIMES A WEEK AS NEEDED FOR WEIGHT OVER 180 POUNDS.  DO NOT TAKE 2 DAYS IN A ROW. (Patient taking differently: Take 2.5 mg by mouth twice a week) 24 tablet 0    Continuous Blood Gluc  (FREESTYLE EMERALD 2 READER) MINDY To check glucose levels 1 each 0    albuterol (PROVENTIL) (2.5 MG/3ML) 0.083% nebulizer solution Take 3 mLs by nebulization every 6 hours as needed for Wheezing or Shortness of Breath 120 each 1    polyethylene glycol (GLYCOLAX) 17 GM/SCOOP powder TAKE 17 GRAMS (1 CAPFUL) BY MOUTH NIGHTLY 510 g 0    LINZESS 290 MCG CAPS capsule TAKE 1 CAPSULE BY MOUTH ONE TIME A DAY FOR 90 DAYS      REXULTI 2 MG TABS tablet Take 2 mg by mouth at bedtime       methocarbamol (ROBAXIN-750) 750 MG tablet Take 1 tablet by mouth 3 times daily as needed (pain and spasm) 30 tablet 0    nystatin (MYCOSTATIN) 445070 UNIT/ML suspension Take 5 mLs by mouth 4 times daily 500 mL 1    traZODone (DESYREL) 50 MG tablet Take 1 tablet by mouth nightly Take it on the day of sleep study 1 tablet 0    loratadine (CLARITIN) 10 MG tablet TAKE 1 TABLET BY MOUTH ONE TIME A DAY  30 tablet 5    ketoconazole (NIZORAL) 2 % shampoo Apply topically daily as needed. 120 mL 1    diazePAM (VALIUM) 5 MG tablet Take 5 mg by mouth 2 times daily as needed for Anxiety. OXYGEN Inhale 2 L into the lungs nightly Sometimes with activity      oxyCODONE (OXYCONTIN) 20 MG extended release tablet Take 20 mg by mouth every 12 hours. aspirin 81 MG EC tablet Take 81 mg by mouth daily      benztropine (COGENTIN) 1 MG tablet Take 1 mg by mouth nightly       oxyCODONE-acetaminophen (PERCOCET)  MG per tablet Take 1 tablet by mouth every 4 hours as needed for Pain . Earliest Fill Date: 6/8/17 100 tablet 0    duloxetine (CYMBALTA) 60 MG capsule Take 60 mg by mouth 2 times daily. mineral oil-hydrophilic petrolatum (HYDROPHOR) ointment Apply topically 4 times daily as needed to hands and feet. 454 g 5    pregabalin (LYRICA) 75 MG capsule TAKE 1 CAPSULE BY MOUTH TWO TIMES A DAY 60 capsule 2     No current facility-administered medications for this visit. Return in about 3 months (around 4/4/2023).

## 2023-01-04 NOTE — PATIENT INSTRUCTIONS
[address] [facility]. [address]  Hours:   Open ? Closes 6PM  Phone: [phone]  - call to see if they can help you get a compression sleeve and glove for left hand.

## 2023-01-04 NOTE — PROGRESS NOTES
Call placed to [facility] [address] [place] [address] in regards to continuation of orders for weekly Lasix IVP. Pt last Appt is 1/5/23 off of current order.

## 2023-01-04 NOTE — TELEPHONE ENCOUNTER
Does Deysi and the patient feel that the infusions are helpful? She has not been back to the office since they were started. Please reach out to both and let me know.   ARETHA

## 2023-01-05 ENCOUNTER — TELEPHONE (OUTPATIENT)
Dept: INTERNAL MEDICINE CLINIC | Age: 53
End: 2023-01-05

## 2023-01-05 ENCOUNTER — HOSPITAL ENCOUNTER (OUTPATIENT)
Dept: ONCOLOGY | Age: 53
Setting detail: INFUSION SERIES
Discharge: HOME | End: 2023-01-05
Payer: COMMERCIAL

## 2023-01-05 VITALS
DIASTOLIC BLOOD PRESSURE: 68 MMHG | HEART RATE: 66 BPM | SYSTOLIC BLOOD PRESSURE: 120 MMHG | RESPIRATION RATE: 16 BRPM | TEMPERATURE: 97.2 F

## 2023-01-05 DIAGNOSIS — I50.32 CHRONIC HEART FAILURE WITH PRESERVED EJECTION FRACTION (HCC): ICD-10-CM

## 2023-01-05 DIAGNOSIS — R06.02 SHORTNESS OF BREATH: ICD-10-CM

## 2023-01-05 DIAGNOSIS — I10 ESSENTIAL HYPERTENSION: ICD-10-CM

## 2023-01-05 DIAGNOSIS — I25.10 CORONARY ARTERY DISEASE INVOLVING NATIVE CORONARY ARTERY OF NATIVE HEART WITHOUT ANGINA PECTORIS: ICD-10-CM

## 2023-01-05 DIAGNOSIS — R06.02 SOB (SHORTNESS OF BREATH): ICD-10-CM

## 2023-01-05 DIAGNOSIS — N18.4 CKD (CHRONIC KIDNEY DISEASE) STAGE 4, GFR 15-29 ML/MIN (HCC): ICD-10-CM

## 2023-01-05 DIAGNOSIS — I50.32 CHRONIC DIASTOLIC CONGESTIVE HEART FAILURE (HCC): ICD-10-CM

## 2023-01-05 LAB
ANION GAP SERPL CALCULATED.3IONS-SCNC: 13 MMOL/L (ref 3–16)
BASOPHILS ABSOLUTE: 0.1 K/UL (ref 0–0.2)
BASOPHILS RELATIVE PERCENT: 1.1 %
BUN BLDV-MCNC: 71 MG/DL (ref 7–20)
CALCIUM SERPL-MCNC: 9 MG/DL (ref 8.3–10.6)
CHLORIDE BLD-SCNC: 87 MMOL/L (ref 99–110)
CO2: 32 MMOL/L (ref 21–32)
CREAT SERPL-MCNC: 1.6 MG/DL (ref 0.6–1.1)
EOSINOPHILS ABSOLUTE: 0.2 K/UL (ref 0–0.6)
EOSINOPHILS RELATIVE PERCENT: 1.9 %
GFR SERPL CREATININE-BSD FRML MDRD: 38 ML/MIN/{1.73_M2}
GLUCOSE BLD-MCNC: 422 MG/DL (ref 70–99)
HCT VFR BLD CALC: 34.9 % (ref 36–48)
HEMOGLOBIN: 11.6 G/DL (ref 12–16)
LYMPHOCYTES ABSOLUTE: 2.2 K/UL (ref 1–5.1)
LYMPHOCYTES RELATIVE PERCENT: 23.2 %
MCH RBC QN AUTO: 28.6 PG (ref 26–34)
MCHC RBC AUTO-ENTMCNC: 33.1 G/DL (ref 31–36)
MCV RBC AUTO: 86.5 FL (ref 80–100)
MONOCYTES ABSOLUTE: 0.6 K/UL (ref 0–1.3)
MONOCYTES RELATIVE PERCENT: 6.5 %
NEUTROPHILS ABSOLUTE: 6.4 K/UL (ref 1.7–7.7)
NEUTROPHILS RELATIVE PERCENT: 67.3 %
PDW BLD-RTO: 14.1 % (ref 12.4–15.4)
PLATELET # BLD: 252 K/UL (ref 135–450)
PMV BLD AUTO: 9.5 FL (ref 5–10.5)
POTASSIUM SERPL-SCNC: 4.5 MMOL/L (ref 3.5–5.1)
PRO-BNP: 167 PG/ML (ref 0–124)
RBC # BLD: 4.04 M/UL (ref 4–5.2)
SODIUM BLD-SCNC: 132 MMOL/L (ref 136–145)
WBC # BLD: 9.6 K/UL (ref 4–11)

## 2023-01-05 PROCEDURE — 83880 ASSAY OF NATRIURETIC PEPTIDE: CPT

## 2023-01-05 PROCEDURE — 2580000003 HC RX 258: Performed by: INTERNAL MEDICINE

## 2023-01-05 PROCEDURE — 80048 BASIC METABOLIC PNL TOTAL CA: CPT

## 2023-01-05 PROCEDURE — 99211 OFF/OP EST MAY X REQ PHY/QHP: CPT

## 2023-01-05 PROCEDURE — 96374 THER/PROPH/DIAG INJ IV PUSH: CPT

## 2023-01-05 PROCEDURE — 6360000002 HC RX W HCPCS: Performed by: INTERNAL MEDICINE

## 2023-01-05 PROCEDURE — 85025 COMPLETE CBC W/AUTO DIFF WBC: CPT

## 2023-01-05 RX ORDER — FUROSEMIDE 10 MG/ML
120 INJECTION INTRAMUSCULAR; INTRAVENOUS ONCE
Status: COMPLETED | OUTPATIENT
Start: 2023-01-05 | End: 2023-01-05

## 2023-01-05 RX ORDER — SODIUM CHLORIDE 0.9 % (FLUSH) 0.9 %
5-40 SYRINGE (ML) INJECTION ONCE
Status: COMPLETED | OUTPATIENT
Start: 2023-01-05 | End: 2023-01-05

## 2023-01-05 RX ORDER — PETROLATUM 42 G/100G
OINTMENT TOPICAL
Qty: 454 G | Refills: 5 | Status: SHIPPED | OUTPATIENT
Start: 2023-01-05

## 2023-01-05 RX ADMIN — SODIUM CHLORIDE, PRESERVATIVE FREE 10 ML: 5 INJECTION INTRAVENOUS at 14:31

## 2023-01-05 RX ADMIN — FUROSEMIDE 120 MG: 10 INJECTION, SOLUTION INTRAMUSCULAR; INTRAVENOUS at 14:31

## 2023-01-05 NOTE — TELEPHONE ENCOUNTER
[address] calling ---they need frequency per day for insurance on the [employer] ---rajinder call them at [phone]. Thanks.

## 2023-01-05 NOTE — TELEPHONE ENCOUNTER
Deysi from infusion center calling back. States pt's last infusion is today and asking if she needs to continue they will need order. [facility] pt has a detailed message from PCP Dr Markie Sanabria yesterday 1/4/23 if erika would like to review.     Any questions you can call Deysi at [phone]

## 2023-01-05 NOTE — PROGRESS NOTES
Pt ambulatory to infusion center for Lasix IVP. Patient on oxygen nasal cannula. IV access obtained. IVP Lasix administered; 120 mg IVP given over 20 minutes. IV removed. Pt/spouse denied need for printed AVS. Pt to return next week for same treatment.

## 2023-01-05 NOTE — DISCHARGE INSTRUCTIONS
furosemide (oral/injection)  Pronunciation:  yenny bloom  Brand:  Lasix  What is the most important information I should know about furosemide? You should not use this medicine if you are unable to urinate. Do not take more than your recommended dose. High doses of furosemide may cause irreversible hearing loss. What is furosemide? Furosemide is a loop diuretic (water pill) that prevents your body from absorbing too much salt. This allows the salt to instead be passed in your urine. Furosemide is used to treat fluid retention (edema) in people with congestive heart failure, liver disease, or a kidney disorder such as nephrotic syndrome. Furosemide is also used to treat high blood pressure (hypertension). Furosemide may also be used for purposes not listed in this medication guide. What should I discuss with my healthcare provider before taking furosemide? You should not use furosemide if you are allergic to it, or if you are unable to urinate. Tell your doctor if you have ever had:  kidney disease;  enlarged prostate, bladder obstruction, urination problems;  cirrhosis or other liver disease;  an electrolyte imbalance (such as low levels of potassium or magnesium in your blood);  gout;  lupus;  diabetes; or  a sulfa drug allergy. Tell your doctor if you have an MRI (magnetic resonance imaging) or any type of scan using a radioactive dye that is injected into your veins. Both contrast dyes and furosemide can harm your kidneys. It is not known whether this medicine will harm an unborn baby. Tell your doctor if you are pregnant or plan to become pregnant. It may not be safe to breastfeed while using this medicine. Ask your doctor about any risk. Furosemide may slow breast milk production. How should I take furosemide? Follow all directions on your prescription label and read all medication guides or instruction sheets. Your doctor may occasionally change your dose.  Use the medicine exactly as directed. Furosemide oral is taken by mouth. Furosemide injection is injected into a muscle or given as an infusion into a vein. A healthcare provider will give you this injection if you are unable to take the medicine by mouth. You may receive your first dose in a hospital or clinic setting if you have severe liver disease. Do not take more than your recommended dose. High doses of furosemide may cause irreversible hearing loss. Measure liquid medicine carefully. Use the dosing syringe provided, or use a medicine dose-measuring device (not a kitchen spoon). Furosemide doses are based on weight in children. Your child's dose needs may change if the child gains or loses weight. Furosemide will make you urinate more often and you may get dehydrated easily. Follow your doctor's instructions about using potassium supplements or getting enough salt and potassium in your diet. Your blood pressure will need to be checked often and you may need other medical tests. If you have high blood pressure, keep using this medicine even if you feel well. High blood pressure often has no symptoms. You may need to use blood pressure medicine for the rest of your life. If you need surgery, tell the surgeon ahead of time that you are using furosemide. Store at room temperature away from moisture, heat, and light. Throw away any unused oral liquid  after 90 days. What happens if I miss a dose? Furosemide is sometimes used only once, so you may not be on a dosing schedule. If you are using the medication regularly, take the medicine as soon as you can, but skip the missed dose if it is almost time for your next dose. Do not take two doses at one time. What happens if I overdose? Seek emergency medical attention or call the Poison Help line at 1-587.463.1155. Overdose symptoms may include feeling very thirsty or hot, heavy sweating, hot and dry skin, extreme weakness, or fainting.   What should I avoid while taking furosemide? Avoid getting up too fast from a sitting or lying position, or you may feel dizzy. Avoid becoming dehydrated. Follow your doctor's instructions about the type and amount of liquids you should drink while you are taking furosemide. Drinking alcohol with this medicine can cause side effects. If you have high blood pressure, ask a doctor or pharmacist before taking any medicines that can raise your blood pressure, such as diet pills or cough-and-cold medicine. What are the possible side effects of furosemide? Get emergency medical help if you have signs of an allergic reaction (hives, difficult breathing, swelling in your face or throat) or a severe skin reaction (fever, sore throat, burning in your eyes, skin pain, red or purple skin rash that spreads and causes blistering and peeling). Call your doctor at once if you have:  a light-headed feeling, like you might pass out;  ringing in your ears, hearing loss;  muscle spasms or contractions;  pale skin, easy bruising, unusual bleeding;  high blood sugar --increased thirst, increased urination, dry mouth, fruity breath odor;  kidney problems --little or no urination, swelling in your feet or ankles, feeling tired or short of breath;  signs of liver or pancreas problems --loss of appetite, upper stomach pain (that may spread to your back), nausea or vomiting, dark urine, jaundice (yellowing of the skin or eyes); or  signs of an electrolyte imbalance --dry mouth, thirst, weakness, drowsiness, feeling jittery or unsteady, vomiting, irregular heartbeats, fluttering in your chest, numbness or tingling, muscle cramps, muscle weakness or limp feeling. Common side effects may include:  diarrhea, constipation, loss of appetite;  numbness or tingling;  headache, dizziness; or  blurred vision. This is not a complete list of side effects and others may occur. Call your doctor for medical advice about side effects.  You may report side effects to FDA at 1-800-FDA-1088. What other drugs will affect furosemide? Sometimes it is not safe to use certain medications at the same time. Some drugs can affect your blood levels of other drugs you take, which may increase side effects or make the medications less effective. If you also take sucralfate, take your furosemide dose 2 hours before or 2 hours after you take sucralfate. Tell your doctor about all your other medicines, especially:  another diuretic, especially ethacrynic acid;  chloral hydrate;  lithium;  phenytoin;  an injected antibiotic;  cancer medicine, such as cisplatin;  heart or blood pressure medicine; or  salicylates such as aspirin, Nuprin Backache Caplet, Kaopectate, KneeRelief, Pamprin Cramp Formula, Pepto-Bismol, Tricosal, Trilisate, and others. This list is not complete. Other drugs may affect furosemide, including prescription and over-the-counter medicines, vitamins, and herbal products. Not all possible drug interactions are listed here. Where can I get more information? Your pharmacist can provide more information about furosemide. Remember, keep this and all other medicines out of the reach of children, never share your medicines with others, and use this medication only for the indication prescribed. Every effort has been made to ensure that the information provided by [facility] [address] is accurate, up-to-date, and complete, but no guarantee is made to that effect. Drug information contained herein may be time sensitive. [facility] information has been compiled for use by healthcare practitioners and consumers in the [place] and therefore [facility] does not warrant that uses outside of the [place] are appropriate, unless specifically indicated otherwise. [facility] drug information does not endorse drugs, diagnose patients or recommend therapy.  [facility]'s drug information is an informational resource designed to assist licensed healthcare practitioners in caring for their patients and/or to serve consumers viewing this service as a supplement to, and not a substitute for, the expertise, skill, knowledge and judgment of healthcare practitioners. The absence of a warning for a given drug or drug combination in no way should be construed to indicate that the drug or drug combination is safe, effective or appropriate for any given patient. [facility] does not assume any responsibility for any aspect of healthcare administered with the aid of information [facility] provides. The information contained herein is not intended to cover all possible uses, directions, precautions, warnings, drug interactions, allergic reactions, or adverse effects. If you have questions about the drugs you are taking, check with your doctor, nurse or pharmacist.  Copyright 9118-3276 [address]. Version: 16.01. Revision date: 5/22/2019. Care instructions adapted under license by [facility] ([facility]). If you have questions about a medical condition or this instruction, always ask your healthcare professional. [place] [address] any warranty or liability for your use of this information.

## 2023-01-08 ASSESSMENT — ENCOUNTER SYMPTOMS
COUGH: 0
SHORTNESS OF BREATH: 1
WHEEZING: 0
CHEST TIGHTNESS: 0

## 2023-01-09 ENCOUNTER — TELEPHONE (OUTPATIENT)
Dept: INTERNAL MEDICINE CLINIC | Age: 53
End: 2023-01-09

## 2023-01-09 NOTE — TELEPHONE ENCOUNTER
Labs looked ok. CBC was normal, no anemia. Kidney function stable. Heart failure test was actually not super high and lower than it has been running.

## 2023-01-09 NOTE — ASSESSMENT & PLAN NOTE
Patient with worsening shortness of breath. Seems to do better the first 4 days following her Lasix infusion and then really struggle the next 3 days. Patient advised to take an additional 50 mg of torsemide today. She is scheduled for an infusion tomorrow. Patient does follow with Dr. Dillon Keene for cardiology and Dr. Sanna Vincent for pulmonology. Advised patient to increase oxygen when she is active. Check CBC to rule out anemia as a contributing factor.

## 2023-01-09 NOTE — ASSESSMENT & PLAN NOTE
Shortness of breath is worsening. She does better with for about 4 days following her Lasix infusion and then she slowly gets worse until her next infusion is due. She is scheduled for Lasix infusion tomorrow but is struggling today. Advised patient to take an additional dose of torsemide 50 mg today.

## 2023-01-09 NOTE — ASSESSMENT & PLAN NOTE
Patient encouraged to follow-up with urology. Referral reprinted today as the phone number has been misplaced.

## 2023-01-09 NOTE — TELEPHONE ENCOUNTER
Pts  came in office for 2nd hep b shot. While he was here he asked about the bloodwork results. I did let him know the Doctor hasn't went over them yet. Please advise and went Doctor goes over results call  or wife to discuss them.

## 2023-01-09 NOTE — ASSESSMENT & PLAN NOTE
Order provided to patient for compression sleeve and compression glove.   Advised patient's  to check with [facility] and [address] [place] [address].

## 2023-01-11 ENCOUNTER — TELEPHONE (OUTPATIENT)
Dept: CARDIOLOGY CLINIC | Age: 53
End: 2023-01-11

## 2023-01-11 ENCOUNTER — TELEPHONE (OUTPATIENT)
Dept: INTERNAL MEDICINE CLINIC | Age: 53
End: 2023-01-11

## 2023-01-11 DIAGNOSIS — M06.00 SERONEGATIVE RHEUMATOID ARTHRITIS (HCC): ICD-10-CM

## 2023-01-11 RX ORDER — FUROSEMIDE 10 MG/ML
120 INJECTION INTRAMUSCULAR; INTRAVENOUS ONCE
Status: CANCELLED
Start: 2023-01-11 | End: 2023-01-11

## 2023-01-11 RX ORDER — SODIUM CHLORIDE 0.9 % (FLUSH) 0.9 %
5-40 SYRINGE (ML) INJECTION ONCE
Status: CANCELLED
Start: 2023-01-11 | End: 2023-01-11

## 2023-01-11 NOTE — TELEPHONE ENCOUNTER
Leora called to request a new order for the Pt infusion as she has a scheduled appt on 01/12. Please fax order to:  [phone]. Please advise. a  Thank you

## 2023-01-11 NOTE — TELEPHONE ENCOUNTER
Trung Barragan from [facility] [address] called in regards to the mineral oil-hydrophilic petrolatum (HYDROPHOR) ointment. States they need to know how often she needs to apply it. If any questions or concerns please call Pharmacy.

## 2023-01-12 ENCOUNTER — HOSPITAL ENCOUNTER (OUTPATIENT)
Dept: ONCOLOGY | Age: 53
Setting detail: INFUSION SERIES
Discharge: HOME | End: 2023-01-12
Payer: COMMERCIAL

## 2023-01-12 VITALS — HEART RATE: 69 BPM | TEMPERATURE: 97 F | SYSTOLIC BLOOD PRESSURE: 124 MMHG | DIASTOLIC BLOOD PRESSURE: 84 MMHG

## 2023-01-12 DIAGNOSIS — I50.32 CHRONIC DIASTOLIC CONGESTIVE HEART FAILURE (HCC): ICD-10-CM

## 2023-01-12 DIAGNOSIS — I50.32 CHRONIC HEART FAILURE WITH PRESERVED EJECTION FRACTION (HCC): Primary | ICD-10-CM

## 2023-01-12 PROCEDURE — 2580000003 HC RX 258: Performed by: INTERNAL MEDICINE

## 2023-01-12 PROCEDURE — 96374 THER/PROPH/DIAG INJ IV PUSH: CPT

## 2023-01-12 PROCEDURE — 6360000002 HC RX W HCPCS: Performed by: INTERNAL MEDICINE

## 2023-01-12 PROCEDURE — 99211 OFF/OP EST MAY X REQ PHY/QHP: CPT

## 2023-01-12 RX ORDER — FUROSEMIDE 10 MG/ML
120 INJECTION INTRAMUSCULAR; INTRAVENOUS ONCE
Status: COMPLETED | OUTPATIENT
Start: 2023-01-12 | End: 2023-01-12

## 2023-01-12 RX ORDER — SODIUM CHLORIDE 0.9 % (FLUSH) 0.9 %
5-40 SYRINGE (ML) INJECTION ONCE
Status: COMPLETED | OUTPATIENT
Start: 2023-01-12 | End: 2023-01-12

## 2023-01-12 RX ORDER — LEFLUNOMIDE 20 MG/1
TABLET ORAL
Qty: 90 TABLET | Refills: 0 | OUTPATIENT
Start: 2023-01-12

## 2023-01-12 RX ORDER — FUROSEMIDE 10 MG/ML
120 INJECTION INTRAMUSCULAR; INTRAVENOUS ONCE
Status: CANCELLED
Start: 2023-01-20 | End: 2023-01-20

## 2023-01-12 RX ORDER — SODIUM CHLORIDE 0.9 % (FLUSH) 0.9 %
5-40 SYRINGE (ML) INJECTION ONCE
Status: CANCELLED
Start: 2023-01-20 | End: 2023-01-20

## 2023-01-12 RX ADMIN — Medication 10 ML: at 14:17

## 2023-01-12 RX ADMIN — FUROSEMIDE 120 MG: 10 INJECTION, SOLUTION INTRAMUSCULAR; INTRAVENOUS at 14:17

## 2023-01-12 NOTE — TELEPHONE ENCOUNTER
LastVisit 9/14/2022   LastLabs  06/14/2022  NextVisit Visit date not found   LastRefilled 09/14/2022

## 2023-01-13 ASSESSMENT — ENCOUNTER SYMPTOMS: GASTROINTESTINAL NEGATIVE: 1

## 2023-01-13 NOTE — PROGRESS NOTES
[address]   Congestive Heart Failure    PrimaryCare Doctor:  Jared Chua DO      Chief Complaint:  CHF    History of Present Illness:  Edgardo Ambrose is a 47 y.o. female with PMH breast cancer, non obstructive CAD, HFpEF who presents today for CHF f/u. She has been getting regular IV lasix 120mg IVP every week for the past 3months     Today: She is about the same but does get some relief from IV lasix for about 3 days, tells me wt goes down to 196 but then trends back up to 203 before next infusion. She c/o chest pain, dyspnea, edema that are unchanged   she denies orthopnea, PND, exertional chest pressure/discomfort, early saiety, syncope. ER Visit: No  Recent Hospitalization: No    Baseline Weight: 190      EF: 55-60%  Cardiac Imaging:Echo 10/3/22:   Summary   Normal left ventricle size, wall thickness and systolic function with an   estimated ejection fraction of 55-60%. No regional wall motion abnormalities are seen. Diastolic filling parameters suggests grade I diastolic dysfunction. Mild mitral regurgitation. Unable to estimate pulmonary artery pressure secondary to incomplete TR jet   envelope. Stress Test 7-1-2020   There is normal isotope uptake at stress and rest. There is no evidence of     myocardial ischemia or scar. Normal LV function. Left ventricular ejection fraction of 59 %. Overall findings represent a low risk scan. Stress Protocols          Resting ECG     Normal sinus rhythm. Echo 11/8/2017:  Normal left ventricle size and systolic function with an estimated ejection fraction of 60%. No regional wall motion abnormalities are seen. There is borderline concentric left ventricular hypertrophy. E/e\"= 13     VQ scan 1/11/2018:  Normal study. No evidence of pulmonary embolus.          Left Heart Cath 2/27/18:  Dominance : Right     LM: bifurcating, MLI  LAD: mild plaquing with small vessel disease distally   LCx: no significant disease  RCA: MLI     LVEDP: 25 mmHg   No Ao gradient     Right Heart Cath   RA: 13  RV: 47/6/16  PA:   46/19    and mean of 32  PCWP: 21 mmHg      Sats:  Ao: 92%  RA: 61%  PA: 62% (x 2)     CO/CI : 6.1 L      Activity: at baseline  Can you walk 1-2 blocks or do a moderate amount of house/yard work? No      NYHA Class: II     Sodium Restrictions: 2g  Fluid Restrictions: 48-64 oz/day  Sodium and fluid restriction compliance: fair    Pt Education: The patient has received education on the following topics: dietary sodium restriction, heart failure medications, the importance of physical activity, symptom management and weight monitoring         Past Medical History:   has a past medical history of Anxiety, Anxiety and depression, Arthritis, Asthma, CAD (coronary artery disease), Cancer ([facility] [address] Cerebral artery occlusion with cerebral infarction ([facility] [address] CHF (congestive heart failure) ([facility] [address] Chronic kidney disease, Chronic pain, Constipation, COPD (chronic obstructive pulmonary disease) ([facility] [address] Depression, Diabetes mellitus ([facility] [address] Diabetic polyneuropathy associated with type 2 diabetes mellitus ([facility] [address] Dysthymia, ESBL (extended spectrum beta-lactamase) producing bacteria infection, Fibromyalgia, Gastric ulcer, unspecified as acute or chronic, without mention of hemorrhage, perforation, or obstruction, GERD (gastroesophageal reflux disease), Gout, HIGH CHOLESTEROL, Hypertension, Hypothyroidism, Pneumonia, Pyelonephritis, Severe persistent asthma without complication, Thyroid disease, and TIA (transient ischemic attack). Surgical History:   has a past surgical history that includes Breast surgery; Hysterectomy (2005); Carpal tunnel release; Tonsillectomy; Finger contracture surgery; Upper gastrointestinal endoscopy (2019); and Mastectomy. Social History:   reports that she has never smoked. She has never used smokeless tobacco. She reports that she does not drink alcohol and does not use drugs.    Family History:   Family History   Problem Relation Age of Onset    Asthma Other     Cancer Other     Depression Other     Diabetes Other     Hypertension Other     High Cholesterol Other     Migraines Other     Heart Attack Father 72         of MI    High Blood Pressure Mother     Diabetes Mother        HomeMedications:  Prior to Admission medications    Medication Sig Start Date End Date Taking? Authorizing Provider   mineral oil-hydrophilic petrolatum (HYDROPHOR) ointment Apply topically 4 times daily as needed to hands and feet. 23   Libby Billing, DO   Compression Sleeves Left arm 23   Libby Billing, DO   Elastic Bandages & Supports (COMPRESSION & SUPPORT GLOVES L) MISC 1 each by Does not apply route daily Left hand 23   Libby Billing, DO   Elastic Bandages & Supports (MEDICAL COMPRESSION STOCKINGS) [facility] 2 each by Does not apply route daily On am/off HS. 23   Libby Billing, DO   Urea 40 % LOTN Apply to feet nightly and cover with Aquaphor 23   Libby Bonilla, DO   sulfaSALAzine (AZULFIDINE) 500 MG tablet TAKE 1 TABLET BY MOUTH TWO TIMES A DAY 12/15/22   Lionel Ordonez MD   simvastatin (ZOCOR) 20 MG tablet TAKE 1 TABLET BY MOUTH one time daily at night 22   Tracey Perez MD   spironolactone (ALDACTONE) 50 MG tablet TAKE 2 TABLETS BY MOUTH IN THE MORNING and 1 tablet in the evening 22   Tracey Perez MD   torsemide (DEMADEX) 100 MG tablet TAKE 1 TABLET BY MOUTH IN THE MORNING AND 1/2 TABLET IN THE EVENING.   Patient taking differently: 100 mg in the morning and at bedtime 22   Tracey Perez MD   omeprazole (PRILOSEC) 20 MG delayed release capsule TAKE 1 CAPSULE BY MOUTH TWO TIMES A DAY 10/26/22   Libby Bonilla DO   FEROSUL 325 (65 Fe) MG tablet TAKE 1 TABLET BY MOUTH TWO TIMES A DAY WITH MEALS 10/12/22   Libby Bonilla, DO   nitroGLYCERIN (NITROSTAT) 0.4 MG SL tablet PLACE ONE TABLET UNDER TONGUE AS NEEDED FOR CHEST PAIN, MAY REPEAT EVERY 5 MINUTES AS NEEDED UP TO A MAX OF 3 TABLETS.  IF NO RELIEF AFTER 1 DOSE, CALL 911. 10/3/22   Jessa Parsons MD   metoprolol tartrate (LOPRESSOR) 25 MG tablet TAKE 1 TABLET BY MOUTH TWO TIMES A DAY 9/28/22   Jessa Parsons MD   Febuxostat 80 MG TABS TAKE 1 TABLET BY MOUTH EVERY DAY 9/14/22   Claudia Dominguez MD   leflunomide (ARAVA) 20 MG tablet TAKE 1 TABLET BY MOUTH EVERY DAY 9/14/22   Claudia Dominguez MD   pregabalin (LYRICA) 75 MG capsule TAKE 1 CAPSULE BY MOUTH TWO TIMES A DAY 9/14/22 10/14/22  Claudia Dominguez MD   SM SENNA-S 8.6-50 MG per tablet TAKE 2 TABLETS BY MOUTH TWO TIMES A DAY 9/6/22   Mariana Simental DO   solifenacin (VESICARE) 10 MG tablet Take 1 tablet by mouth daily 8/23/22 8/23/23  Mariana Simental DO   meclizine (ANTIVERT) 25 MG tablet Take 1 tablet by mouth 3 times daily as needed for Dizziness 8/23/22   Mariana Simental DO   montelukast (SINGULAIR) 10 MG tablet Take 1 tablet by mouth nightly 8/22/22   Dior Olivo MD   TRESIBA FLEXTOUCH 200 UNIT/ML SOPN INJECT 200 UNITS SUBCUTANEOUSLY ONE TIME DAILY 8/12/22   Lizzy Taylor MD   lubiprostone (AMITIZA) 24 MCG capsule TAKE 1 CAPSULE BY MOUTH TWO TIMES A DAY WITH MEALS 8/3/22   Mariana Simental DO   Continuous Blood Gluc Sensor (FREESTYLE EMERALD 2 SENSOR) MISC Change every 14 days 7/27/22   Lizzy Taylor MD   insulin aspart (NOVOLOG FLEXPEN) 100 UNIT/ML injection pen inject 30 to 50 units into the skin three time daily before meals 7/27/22   Lizzy Taylor MD   Insulin Pen Needle (B-D UF III MINI PEN NEEDLES) 31G X 5 MM MISC use five times daily with insulin 7/27/22   Lizzy Taylor MD   levothyroxine (SYNTHROID) 150 MCG tablet TAKE 1 TABLET BY MOUTH IN THE MORNING before breakfast 7/27/22   Lizzy Taylor MD   vitamin D3 (CHOLECALCIFEROL) 25 MCG (1000 UT) TABS tablet TAKE 3 TABLETS BY MOUTH ONE TIME A DAY 7/27/22   Lizzy Taylor MD   STEGLATRO 5 MG TABS TAKE 1 TABLET BY MOUTH EVERY DAY 7/13/22   Lizzy Taylor MD   Blood Glucose Monitoring Suppl (ONETOUCH VERIO) w/Device KIT Use to check glucose 4  times daily. 7/12/22   Harmony Arshad MD   Lidocaine 5 % CREA Apply to feet QID prn pain for peripheral neuropathy 6/21/22   Luann Peters DO   butalbital-acetaminophen-caffeine [facility]) -04 MG per tablet Take 1 tablet by mouth every 6 hours as needed for Headaches 6/21/22   Luann Peters DO   rosuvastatin (CRESTOR) 10 MG tablet Take 1 tablet by mouth nightly 6/21/22   Luann Peters DO   ranolazine (RANEXA) 500 MG extended release tablet TAKE 1 TABLET BY MOUTH TWO TIMES A DAY 6/6/22   Rafa Langston MD   Compression Stockings MISC by Does not apply route Zippered stockings for daily use on lower extremities (do not wear at night). 20-30mmHg. 4/20/22   Rafa Langston MD   [facility] VERIO strip use to test blood sugar 5 times daily 4/14/22   Harmony Arshad MD   metOLazone (ZAROXOLYN) 2.5 MG tablet TAKE 1 TABLET BY MOUTH TWO TIMES A WEEK AS NEEDED FOR WEIGHT OVER 180 POUNDS. DO NOT TAKE 2 DAYS IN A ROW.   Patient taking differently: Take 2.5 mg by mouth twice a week 3/18/22   HERMES Medina - CNS   Continuous Blood Gluc  (FREESTYLE EMERALD 2 READER) MINDY To check glucose levels 3/15/22   Harmony Arshad MD   albuterol (PROVENTIL) (2.5 MG/3ML) 0.083% nebulizer solution Take 3 mLs by nebulization every 6 hours as needed for Wheezing or Shortness of Breath 1/26/22   Luann Peters DO   polyethylene glycol (GLYCOLAX) 17 GM/SCOOP powder TAKE 17 GRAMS (1 CAPFUL) BY MOUTH NIGHTLY 8/12/21   MD TRAY FraustoZESS 290 MCG CAPS capsule TAKE 1 CAPSULE BY MOUTH ONE TIME A DAY FOR 90 DAYS 6/25/21   Historical Provider, MD   REXULTI 2 MG TABS tablet Take 2 mg by mouth at bedtime  7/21/21   Historical Provider, MD   methocarbamol (ROBAXIN-750) 750 MG tablet Take 1 tablet by mouth 3 times daily as needed (pain and spasm) 7/26/21   Vinay Martinez MD   nystatin (MYCOSTATIN) 529883 UNIT/ML suspension Take 5 mLs by mouth 4 times daily 11/18/20   DO Fariha Schofield ([facility]) 50 MG tablet Take 1 tablet by mouth nightly Take it on the day of sleep study 11/16/20   Tami Santa MD   loratadine (CLARITIN) 10 MG tablet TAKE 1 TABLET BY MOUTH ONE TIME A DAY  10/5/20   Maksim Rjaan DO   ketoconazole (NIZORAL) 2 % shampoo Apply topically daily as needed. 8/10/20   Maksim Rajan DO   diazePAM (VALIUM) 5 MG tablet Take 5 mg by mouth 2 times daily as needed for Anxiety. Historical Provider, MD   OXYGEN Inhale 2 L into the lungs nightly Sometimes with activity    Historical Provider, MD   oxyCODONE (OXYCONTIN) 20 MG extended release tablet Take 20 mg by mouth every 12 hours. Historical Provider, MD   aspirin 81 MG EC tablet Take 81 mg by mouth daily    Historical Provider, MD   benztropine (COGENTIN) 1 MG tablet Take 1 mg by mouth nightly     Historical Provider, MD   oxyCODONE-acetaminophen (PERCOCET)  MG per tablet Take 1 tablet by mouth every 4 hours as needed for Pain . Earliest Fill Date: 6/8/17 6/8/17   HERMES Coto - CNP   duloxetine (CYMBALTA) 60 MG capsule Take 60 mg by mouth 2 times daily. Historical Provider, MD        Allergies: Iv dye [iodides], Diazepam, Insulin glargine, and Trazodone and nefazodone     ROS:   Review of Systems   Constitutional:  Positive for fatigue. Respiratory:  Positive for shortness of breath. Cardiovascular:  Positive for chest pain, palpitations and leg swelling. Gastrointestinal: Negative. Genitourinary: Negative. Musculoskeletal: Negative. Skin: Negative. Neurological: Negative. Hematological: Negative. Psychiatric/Behavioral: Negative. Physical Examination:    Vitals:    01/16/23 1430   BP: 98/64   Pulse: 78   SpO2: 93%   Weight: 203 lb 3.2 oz (92.2 kg)   Height: 5' (1.524 m)           Physical Exam  Constitutional:       Appearance: She is well-developed. HENT:      Head: Normocephalic and atraumatic. Eyes:      Pupils: Pupils are equal, round, and reactive to light. Cardiovascular:      Rate and Rhythm: Normal rate and regular rhythm. Heart sounds: Normal heart sounds. Pulmonary:      Effort: Pulmonary effort is normal.      Breath sounds: Normal breath sounds. Abdominal:      Palpations: Abdomen is soft. Musculoskeletal:         General: Normal range of motion. Cervical back: Normal range of motion and neck supple. Right lower leg: Edema present. Left lower leg: Edema present. Comments: Both hands, L>R   Skin:     General: Skin is warm and dry. Neurological:      Mental Status: She is alert and oriented to person, place, and time. Psychiatric:         Behavior: Behavior normal.         Thought Content:  Thought content normal.         Judgment: Judgment normal.       Lab Data:    CBC:   Lab Results   Component Value Date/Time    WBC 9.6 01/05/2023 02:28 PM    WBC 8.3 08/22/2022 03:25 PM    WBC 7.4 06/14/2022 03:08 PM    RBC 4.04 01/05/2023 02:28 PM    RBC 4.07 08/22/2022 03:25 PM    RBC 4.54 06/14/2022 03:08 PM    RBC 3.57 05/16/2017 03:47 PM    RBC 3.99 02/07/2017 03:47 PM    RBC 3.70 11/08/2016 03:01 PM    HGB 11.6 01/05/2023 02:28 PM    HGB 11.5 08/22/2022 03:25 PM    HGB 12.9 06/14/2022 03:08 PM    HCT 34.9 01/05/2023 02:28 PM    HCT 35.2 08/22/2022 03:25 PM    HCT 39.2 06/14/2022 03:08 PM    MCV 86.5 01/05/2023 02:28 PM    MCV 86.6 08/22/2022 03:25 PM    MCV 86.4 06/14/2022 03:08 PM    RDW 14.1 01/05/2023 02:28 PM    RDW 13.8 08/22/2022 03:25 PM    RDW 13.8 06/14/2022 03:08 PM     01/05/2023 02:28 PM     08/22/2022 03:25 PM     06/14/2022 03:08 PM     BMP:  Lab Results   Component Value Date/Time     01/05/2023 02:28 PM     12/08/2022 02:07 PM     11/10/2022 02:05 PM    K 4.5 01/05/2023 02:28 PM    K 4.1 12/08/2022 02:07 PM    K 3.9 11/10/2022 02:05 PM    K 4.4 10/29/2020 04:29 AM    K 4.4 10/28/2020 11:44 PM    K 4.2 10/28/2020 04:56 PM    CL 87 01/05/2023 02:28 PM    CL 90 12/08/2022 02:07 PM CL 90 11/10/2022 02:05 PM    CO2 32 01/05/2023 02:28 PM    CO2 33 12/08/2022 02:07 PM    CO2 33 11/10/2022 02:05 PM    PHOS 3.4 02/07/2019 12:25 PM    PHOS 3.1 08/11/2018 02:00 PM    PHOS 3.2 03/25/2018 05:44 AM    BUN 71 01/05/2023 02:28 PM    BUN 64 12/08/2022 02:07 PM    BUN 57 11/10/2022 02:05 PM    CREATININE 1.6 01/05/2023 02:28 PM    CREATININE 1.4 12/08/2022 02:07 PM    CREATININE 1.7 11/10/2022 02:05 PM     BNP:   Lab Results   Component Value Date/Time    PROBNP 167 01/05/2023 02:28 PM    PROBNP 269 12/08/2022 02:07 PM    PROBNP 429 11/10/2022 02:05 PM         Assessment/Plan:    Encounter Diagnoses        Chronic heart failure with preserved ejection fraction (HCC) Continue lasix 120mg IVP weekly, change metolazone to day of infusion, labs monthly    Essential hypertension controlled    SOB (shortness of breath) stable    Localized edema stable         Instructions:   Medications: continue current medications, we'll et you know about Dr Lagos Or orders for IV lasix  Labs: as ordered at infusion center  Follow up: 3 months with Dr. Stokes Solid: [phone]      I appreciate the opportunity of cooperating in the care of this individual.    Renee Vaca, APRN - CNP, CNP, 1/13/2023,1:03 PM    QUALITY MEASURES  1. Tobacco Cessation Counseling: NA  2. Retake of BP if >140/90:   NA  3. Documentation to PCP/referring for new patient:  Sent to PCP at close of office visit  4. CAD patient on anti-platelet: Yes  5. CAD patient on STATIN therapy:  No  6. Patient with CHF and aFib on anticoagulation:  NA   7. Patient Education:Yes   8. BB for LVSD :  NA   9. ACE/ARB for LVSD:  NA   10.  Left Ventricular Ejection Fraction (LVEF) Assessment:  Yes

## 2023-01-16 ENCOUNTER — TELEPHONE (OUTPATIENT)
Dept: ADMINISTRATIVE | Age: 53
End: 2023-01-16

## 2023-01-16 ENCOUNTER — OFFICE VISIT (OUTPATIENT)
Dept: CARDIOLOGY CLINIC | Age: 53
End: 2023-01-16
Payer: COMMERCIAL

## 2023-01-16 VITALS
BODY MASS INDEX: 39.89 KG/M2 | WEIGHT: 203.2 LBS | HEIGHT: 60 IN | SYSTOLIC BLOOD PRESSURE: 98 MMHG | DIASTOLIC BLOOD PRESSURE: 64 MMHG | HEART RATE: 78 BPM | OXYGEN SATURATION: 93 %

## 2023-01-16 DIAGNOSIS — R60.0 LOCALIZED EDEMA: ICD-10-CM

## 2023-01-16 DIAGNOSIS — I50.32 CHRONIC HEART FAILURE WITH PRESERVED EJECTION FRACTION (HCC): Primary | ICD-10-CM

## 2023-01-16 DIAGNOSIS — R06.02 SOB (SHORTNESS OF BREATH): ICD-10-CM

## 2023-01-16 DIAGNOSIS — I10 ESSENTIAL HYPERTENSION: ICD-10-CM

## 2023-01-16 PROCEDURE — 99214 OFFICE O/P EST MOD 30 MIN: CPT | Performed by: NURSE PRACTITIONER

## 2023-01-16 PROCEDURE — G8482 FLU IMMUNIZE ORDER/ADMIN: HCPCS | Performed by: NURSE PRACTITIONER

## 2023-01-16 PROCEDURE — G8417 CALC BMI ABV UP PARAM F/U: HCPCS | Performed by: NURSE PRACTITIONER

## 2023-01-16 PROCEDURE — G8427 DOCREV CUR MEDS BY ELIG CLIN: HCPCS | Performed by: NURSE PRACTITIONER

## 2023-01-16 PROCEDURE — 3078F DIAST BP <80 MM HG: CPT | Performed by: NURSE PRACTITIONER

## 2023-01-16 PROCEDURE — 3074F SYST BP LT 130 MM HG: CPT | Performed by: NURSE PRACTITIONER

## 2023-01-16 PROCEDURE — 3017F COLORECTAL CA SCREEN DOC REV: CPT | Performed by: NURSE PRACTITIONER

## 2023-01-16 PROCEDURE — 1036F TOBACCO NON-USER: CPT | Performed by: NURSE PRACTITIONER

## 2023-01-16 ASSESSMENT — ENCOUNTER SYMPTOMS: SHORTNESS OF BREATH: 1

## 2023-01-16 NOTE — TELEPHONE ENCOUNTER
Submitted PA for UREA LOTION  Via CMM Key: [id number] STATUS: NOT SENT. I need a DX CODE to send in with PA please. If this requires a response please respond to the pool ( P [facility] [address] [facility]). Thank you please advise patient.

## 2023-01-16 NOTE — Clinical Note
Please let pt know that Dr. Wen Funk wants to keep IV lasix the same but she is to take her metolazone the morning of the IV lasix before she goes for infusion. Take other dose on day 4 please.  Torsten Perry

## 2023-01-17 NOTE — TELEPHONE ENCOUNTER
Submitted PA for Urea 40% lotion. Key: [id number]. Via CMM STATUS: No PA required per rep, Dimple FERRIS, from [place] at [phone]. QAZ#[id number]. Letter attached. If this requires a response please respond to the pool ( P [facility] [address] [facility]). Thank you please advise patient.

## 2023-01-18 DIAGNOSIS — D50.9 IRON DEFICIENCY ANEMIA, UNSPECIFIED IRON DEFICIENCY ANEMIA TYPE: ICD-10-CM

## 2023-01-18 DIAGNOSIS — M06.00 SERONEGATIVE RHEUMATOID ARTHRITIS (HCC): ICD-10-CM

## 2023-01-19 ENCOUNTER — HOSPITAL ENCOUNTER (OUTPATIENT)
Dept: ONCOLOGY | Age: 53
Setting detail: INFUSION SERIES
Discharge: HOME | End: 2023-01-19
Payer: COMMERCIAL

## 2023-01-19 VITALS
TEMPERATURE: 97.4 F | RESPIRATION RATE: 16 BRPM | SYSTOLIC BLOOD PRESSURE: 113 MMHG | OXYGEN SATURATION: 93 % | DIASTOLIC BLOOD PRESSURE: 75 MMHG | HEART RATE: 68 BPM

## 2023-01-19 DIAGNOSIS — I50.32 CHRONIC HEART FAILURE WITH PRESERVED EJECTION FRACTION (HCC): Primary | ICD-10-CM

## 2023-01-19 DIAGNOSIS — I50.32 CHRONIC DIASTOLIC CONGESTIVE HEART FAILURE (HCC): ICD-10-CM

## 2023-01-19 LAB
ANION GAP SERPL CALCULATED.3IONS-SCNC: 12 MMOL/L (ref 3–16)
BUN BLDV-MCNC: 69 MG/DL (ref 7–20)
CALCIUM SERPL-MCNC: 9.1 MG/DL (ref 8.3–10.6)
CHLORIDE BLD-SCNC: 88 MMOL/L (ref 99–110)
CO2: 34 MMOL/L (ref 21–32)
CREAT SERPL-MCNC: 1.8 MG/DL (ref 0.6–1.1)
GFR SERPL CREATININE-BSD FRML MDRD: 33 ML/MIN/{1.73_M2}
GLUCOSE BLD-MCNC: 212 MG/DL (ref 70–99)
POTASSIUM SERPL-SCNC: 4.8 MMOL/L (ref 3.5–5.1)
PRO-BNP: 192 PG/ML (ref 0–124)
SODIUM BLD-SCNC: 134 MMOL/L (ref 136–145)

## 2023-01-19 PROCEDURE — 96374 THER/PROPH/DIAG INJ IV PUSH: CPT

## 2023-01-19 PROCEDURE — 99211 OFF/OP EST MAY X REQ PHY/QHP: CPT

## 2023-01-19 PROCEDURE — 2580000003 HC RX 258: Performed by: INTERNAL MEDICINE

## 2023-01-19 PROCEDURE — 83880 ASSAY OF NATRIURETIC PEPTIDE: CPT

## 2023-01-19 PROCEDURE — 80048 BASIC METABOLIC PNL TOTAL CA: CPT

## 2023-01-19 PROCEDURE — 6360000002 HC RX W HCPCS: Performed by: INTERNAL MEDICINE

## 2023-01-19 RX ORDER — FUROSEMIDE 10 MG/ML
120 INJECTION INTRAMUSCULAR; INTRAVENOUS ONCE
Status: CANCELLED
Start: 2023-01-20 | End: 2023-01-20

## 2023-01-19 RX ORDER — SODIUM CHLORIDE 0.9 % (FLUSH) 0.9 %
5-40 SYRINGE (ML) INJECTION ONCE
Status: CANCELLED
Start: 2023-01-20 | End: 2023-01-20

## 2023-01-19 RX ORDER — SODIUM CHLORIDE 0.9 % (FLUSH) 0.9 %
5-40 SYRINGE (ML) INJECTION ONCE
Status: COMPLETED | OUTPATIENT
Start: 2023-01-19 | End: 2023-01-19

## 2023-01-19 RX ORDER — FUROSEMIDE 10 MG/ML
120 INJECTION INTRAMUSCULAR; INTRAVENOUS ONCE
Status: COMPLETED | OUTPATIENT
Start: 2023-01-19 | End: 2023-01-19

## 2023-01-19 RX ORDER — LEFLUNOMIDE 20 MG/1
TABLET ORAL
Qty: 90 TABLET | Refills: 0 | OUTPATIENT
Start: 2023-01-19

## 2023-01-19 RX ORDER — FERROUS SULFATE 325(65) MG
TABLET ORAL
Qty: 180 TABLET | Refills: 0 | Status: SHIPPED | OUTPATIENT
Start: 2023-01-19

## 2023-01-19 RX ADMIN — FUROSEMIDE 120 MG: 10 INJECTION, SOLUTION INTRAMUSCULAR; INTRAVENOUS at 14:41

## 2023-01-19 RX ADMIN — SODIUM CHLORIDE, PRESERVATIVE FREE 10 ML: 5 INJECTION INTRAVENOUS at 14:42

## 2023-01-19 NOTE — DISCHARGE INSTRUCTIONS
furosemide (oral/injection)  Pronunciation:  yenny bloom  Brand:  Lasix  What is the most important information I should know about furosemide? You should not use this medicine if you are unable to urinate. Do not take more than your recommended dose. High doses of furosemide may cause irreversible hearing loss. What is furosemide? Furosemide is a loop diuretic (water pill) that prevents your body from absorbing too much salt. This allows the salt to instead be passed in your urine. Furosemide is used to treat fluid retention (edema) in people with congestive heart failure, liver disease, or a kidney disorder such as nephrotic syndrome. Furosemide is also used to treat high blood pressure (hypertension). Furosemide may also be used for purposes not listed in this medication guide. What should I discuss with my healthcare provider before taking furosemide? You should not use furosemide if you are allergic to it, or if you are unable to urinate. Tell your doctor if you have ever had:  kidney disease;  enlarged prostate, bladder obstruction, urination problems;  cirrhosis or other liver disease;  an electrolyte imbalance (such as low levels of potassium or magnesium in your blood);  gout;  lupus;  diabetes; or  a sulfa drug allergy. Tell your doctor if you have an MRI (magnetic resonance imaging) or any type of scan using a radioactive dye that is injected into your veins. Both contrast dyes and furosemide can harm your kidneys. It is not known whether this medicine will harm an unborn baby. Tell your doctor if you are pregnant or plan to become pregnant. It may not be safe to breastfeed while using this medicine. Ask your doctor about any risk. Furosemide may slow breast milk production. How should I take furosemide? Follow all directions on your prescription label and read all medication guides or instruction sheets. Your doctor may occasionally change your dose.  Use the medicine exactly as directed. Furosemide oral is taken by mouth. Furosemide injection is injected into a muscle or given as an infusion into a vein. A healthcare provider will give you this injection if you are unable to take the medicine by mouth. You may receive your first dose in a hospital or clinic setting if you have severe liver disease. Do not take more than your recommended dose. High doses of furosemide may cause irreversible hearing loss. Measure liquid medicine carefully. Use the dosing syringe provided, or use a medicine dose-measuring device (not a kitchen spoon). Furosemide doses are based on weight in children. Your child's dose needs may change if the child gains or loses weight. Furosemide will make you urinate more often and you may get dehydrated easily. Follow your doctor's instructions about using potassium supplements or getting enough salt and potassium in your diet. Your blood pressure will need to be checked often and you may need other medical tests. If you have high blood pressure, keep using this medicine even if you feel well. High blood pressure often has no symptoms. You may need to use blood pressure medicine for the rest of your life. If you need surgery, tell the surgeon ahead of time that you are using furosemide. Store at room temperature away from moisture, heat, and light. Throw away any unused oral liquid  after 90 days. What happens if I miss a dose? Furosemide is sometimes used only once, so you may not be on a dosing schedule. If you are using the medication regularly, take the medicine as soon as you can, but skip the missed dose if it is almost time for your next dose. Do not take two doses at one time. What happens if I overdose? Seek emergency medical attention or call the Poison Help line at 1-308.540.2759. Overdose symptoms may include feeling very thirsty or hot, heavy sweating, hot and dry skin, extreme weakness, or fainting.   What should I avoid while taking furosemide? Avoid getting up too fast from a sitting or lying position, or you may feel dizzy. Avoid becoming dehydrated. Follow your doctor's instructions about the type and amount of liquids you should drink while you are taking furosemide. Drinking alcohol with this medicine can cause side effects. If you have high blood pressure, ask a doctor or pharmacist before taking any medicines that can raise your blood pressure, such as diet pills or cough-and-cold medicine. What are the possible side effects of furosemide? Get emergency medical help if you have signs of an allergic reaction (hives, difficult breathing, swelling in your face or throat) or a severe skin reaction (fever, sore throat, burning in your eyes, skin pain, red or purple skin rash that spreads and causes blistering and peeling). Call your doctor at once if you have:  a light-headed feeling, like you might pass out;  ringing in your ears, hearing loss;  muscle spasms or contractions;  pale skin, easy bruising, unusual bleeding;  high blood sugar --increased thirst, increased urination, dry mouth, fruity breath odor;  kidney problems --little or no urination, swelling in your feet or ankles, feeling tired or short of breath;  signs of liver or pancreas problems --loss of appetite, upper stomach pain (that may spread to your back), nausea or vomiting, dark urine, jaundice (yellowing of the skin or eyes); or  signs of an electrolyte imbalance --dry mouth, thirst, weakness, drowsiness, feeling jittery or unsteady, vomiting, irregular heartbeats, fluttering in your chest, numbness or tingling, muscle cramps, muscle weakness or limp feeling. Common side effects may include:  diarrhea, constipation, loss of appetite;  numbness or tingling;  headache, dizziness; or  blurred vision. This is not a complete list of side effects and others may occur. Call your doctor for medical advice about side effects.  You may report side effects to FDA at 1-800-FDA-1088. What other drugs will affect furosemide? Sometimes it is not safe to use certain medications at the same time. Some drugs can affect your blood levels of other drugs you take, which may increase side effects or make the medications less effective. If you also take sucralfate, take your furosemide dose 2 hours before or 2 hours after you take sucralfate. Tell your doctor about all your other medicines, especially:  another diuretic, especially ethacrynic acid;  chloral hydrate;  lithium;  phenytoin;  an injected antibiotic;  cancer medicine, such as cisplatin;  heart or blood pressure medicine; or  salicylates such as aspirin, Nuprin Backache Caplet, Kaopectate, KneeRelief, Pamprin Cramp Formula, Pepto-Bismol, Tricosal, Trilisate, and others. This list is not complete. Other drugs may affect furosemide, including prescription and over-the-counter medicines, vitamins, and herbal products. Not all possible drug interactions are listed here. Where can I get more information? Your pharmacist can provide more information about furosemide. Remember, keep this and all other medicines out of the reach of children, never share your medicines with others, and use this medication only for the indication prescribed. Every effort has been made to ensure that the information provided by [address] is accurate, up-to-date, and complete, but no guarantee is made to that effect. Drug information contained herein may be time sensitive. [facility] information has been compiled for use by healthcare practitioners and consumers in the [facility] and therefore [facility] does not warrant that uses outside of the [facility] are appropriate, unless specifically indicated otherwise. [facility]'s drug information does not endorse drugs, diagnose patients or recommend therapy.  [facility]'s drug information is an informational resource designed to assist licensed healthcare practitioners in caring for their patients and/or to serve consumers viewing this service as a supplement to, and not a substitute for, the expertise, skill, knowledge and judgment of healthcare practitioners. The absence of a warning for a given drug or drug combination in no way should be construed to indicate that the drug or drug combination is safe, effective or appropriate for any given patient. [facility] does not assume any responsibility for any aspect of healthcare administered with the aid of information [facility] provides. The information contained herein is not intended to cover all possible uses, directions, precautions, warnings, drug interactions, allergic reactions, or adverse effects. If you have questions about the drugs you are taking, check with your doctor, nurse or pharmacist.  Copyright 2931-0055 [address]. Version: 16.01. Revision date: 5/22/2019. Care instructions adapted under license by [facility] ([facility]). If you have questions about a medical condition or this instruction, always ask your healthcare professional. [place] [address] any warranty or liability for your use of this information.

## 2023-01-19 NOTE — PROGRESS NOTES
Pt ambulatory to infusion center for Lasix IVP. IV access obtained. Labs drawn. IVP Lasix administered; 120 mg IVP given over 20 minutes. IV removed. Pt/spouse denied need for printed AVS. Pt to return next week for same treatment.

## 2023-01-24 ENCOUNTER — TELEPHONE (OUTPATIENT)
Dept: CARDIOLOGY CLINIC | Age: 53
End: 2023-01-24

## 2023-01-24 DIAGNOSIS — M06.00 SERONEGATIVE RHEUMATOID ARTHRITIS (HCC): ICD-10-CM

## 2023-01-24 NOTE — TELEPHONE ENCOUNTER
----- Message from HERMES Luciano CNP sent at 1/16/2023  3:57 PM EST -----  Please let pt know that Dr. Mayito Reynolds wants to keep IV lasix the same but she is to take her metolazone the morning of the IV lasix before she goes for infusion. Take other dose on day 4 please.  Naomi Dominguez

## 2023-01-25 RX ORDER — LEFLUNOMIDE 20 MG/1
TABLET ORAL
Qty: 90 TABLET | Refills: 0 | OUTPATIENT
Start: 2023-01-25

## 2023-01-26 ENCOUNTER — HOSPITAL ENCOUNTER (OUTPATIENT)
Dept: ONCOLOGY | Age: 53
Setting detail: INFUSION SERIES
Discharge: HOME | End: 2023-01-26
Payer: COMMERCIAL

## 2023-01-26 VITALS
BODY MASS INDEX: 39.45 KG/M2 | OXYGEN SATURATION: 97 % | TEMPERATURE: 96.5 F | WEIGHT: 202 LBS | SYSTOLIC BLOOD PRESSURE: 124 MMHG | HEART RATE: 66 BPM | DIASTOLIC BLOOD PRESSURE: 69 MMHG | RESPIRATION RATE: 18 BRPM

## 2023-01-26 DIAGNOSIS — I50.32 CHRONIC HEART FAILURE WITH PRESERVED EJECTION FRACTION (HCC): Primary | ICD-10-CM

## 2023-01-26 DIAGNOSIS — I50.32 CHRONIC DIASTOLIC CONGESTIVE HEART FAILURE (HCC): ICD-10-CM

## 2023-01-26 PROCEDURE — 99211 OFF/OP EST MAY X REQ PHY/QHP: CPT

## 2023-01-26 PROCEDURE — 96374 THER/PROPH/DIAG INJ IV PUSH: CPT

## 2023-01-26 PROCEDURE — 6360000002 HC RX W HCPCS: Performed by: INTERNAL MEDICINE

## 2023-01-26 PROCEDURE — 2580000003 HC RX 258: Performed by: INTERNAL MEDICINE

## 2023-01-26 RX ORDER — SODIUM CHLORIDE 0.9 % (FLUSH) 0.9 %
5-40 SYRINGE (ML) INJECTION ONCE
Status: COMPLETED | OUTPATIENT
Start: 2023-01-26 | End: 2023-01-26

## 2023-01-26 RX ORDER — FUROSEMIDE 10 MG/ML
120 INJECTION INTRAMUSCULAR; INTRAVENOUS ONCE
Status: COMPLETED | OUTPATIENT
Start: 2023-01-26 | End: 2023-01-26

## 2023-01-26 RX ORDER — SODIUM CHLORIDE 0.9 % (FLUSH) 0.9 %
5-40 SYRINGE (ML) INJECTION ONCE
Status: CANCELLED
Start: 2023-02-02 | End: 2023-02-02

## 2023-01-26 RX ORDER — FUROSEMIDE 10 MG/ML
120 INJECTION INTRAMUSCULAR; INTRAVENOUS ONCE
Status: CANCELLED
Start: 2023-02-02 | End: 2023-02-02

## 2023-01-26 RX ADMIN — SODIUM CHLORIDE, PRESERVATIVE FREE 10 ML: 5 INJECTION INTRAVENOUS at 14:18

## 2023-01-26 RX ADMIN — FUROSEMIDE 120 MG: 10 INJECTION, SOLUTION INTRAMUSCULAR; INTRAVENOUS at 14:20

## 2023-01-26 NOTE — DISCHARGE INSTRUCTIONS
furosemide (oral/injection)  Pronunciation:  yenny bloom  Brand:  Lasix  What is the most important information I should know about furosemide? You should not use this medicine if you are unable to urinate. Do not take more than your recommended dose. High doses of furosemide may cause irreversible hearing loss. What is furosemide? Furosemide is a loop diuretic (water pill) that prevents your body from absorbing too much salt. This allows the salt to instead be passed in your urine. Furosemide is used to treat fluid retention (edema) in people with congestive heart failure, liver disease, or a kidney disorder such as nephrotic syndrome. Furosemide is also used to treat high blood pressure (hypertension). Furosemide may also be used for purposes not listed in this medication guide. What should I discuss with my healthcare provider before taking furosemide? You should not use furosemide if you are allergic to it, or if you are unable to urinate. Tell your doctor if you have ever had:  kidney disease;  enlarged prostate, bladder obstruction, urination problems;  cirrhosis or other liver disease;  an electrolyte imbalance (such as low levels of potassium or magnesium in your blood);  gout;  lupus;  diabetes; or  a sulfa drug allergy. Tell your doctor if you have an MRI (magnetic resonance imaging) or any type of scan using a radioactive dye that is injected into your veins. Both contrast dyes and furosemide can harm your kidneys. It is not known whether this medicine will harm an unborn baby. Tell your doctor if you are pregnant or plan to become pregnant. It may not be safe to breastfeed while using this medicine. Ask your doctor about any risk. Furosemide may slow breast milk production. How should I take furosemide? Follow all directions on your prescription label and read all medication guides or instruction sheets. Your doctor may occasionally change your dose.  Use the medicine exactly as directed. Furosemide oral is taken by mouth. Furosemide injection is injected into a muscle or given as an infusion into a vein. A healthcare provider will give you this injection if you are unable to take the medicine by mouth. You may receive your first dose in a hospital or clinic setting if you have severe liver disease. Do not take more than your recommended dose. High doses of furosemide may cause irreversible hearing loss. Measure liquid medicine carefully. Use the dosing syringe provided, or use a medicine dose-measuring device (not a kitchen spoon). Furosemide doses are based on weight in children. Your child's dose needs may change if the child gains or loses weight. Furosemide will make you urinate more often and you may get dehydrated easily. Follow your doctor's instructions about using potassium supplements or getting enough salt and potassium in your diet. Your blood pressure will need to be checked often and you may need other medical tests. If you have high blood pressure, keep using this medicine even if you feel well. High blood pressure often has no symptoms. You may need to use blood pressure medicine for the rest of your life. If you need surgery, tell the surgeon ahead of time that you are using furosemide. Store at room temperature away from moisture, heat, and light. Throw away any unused oral liquid  after 90 days. What happens if I miss a dose? Furosemide is sometimes used only once, so you may not be on a dosing schedule. If you are using the medication regularly, take the medicine as soon as you can, but skip the missed dose if it is almost time for your next dose. Do not take two doses at one time. What happens if I overdose? Seek emergency medical attention or call the Poison Help line at 1-570.926.9017. Overdose symptoms may include feeling very thirsty or hot, heavy sweating, hot and dry skin, extreme weakness, or fainting.   What should I avoid while taking furosemide? Avoid getting up too fast from a sitting or lying position, or you may feel dizzy. Avoid becoming dehydrated. Follow your doctor's instructions about the type and amount of liquids you should drink while you are taking furosemide. Drinking alcohol with this medicine can cause side effects. If you have high blood pressure, ask a doctor or pharmacist before taking any medicines that can raise your blood pressure, such as diet pills or cough-and-cold medicine. What are the possible side effects of furosemide? Get emergency medical help if you have signs of an allergic reaction (hives, difficult breathing, swelling in your face or throat) or a severe skin reaction (fever, sore throat, burning in your eyes, skin pain, red or purple skin rash that spreads and causes blistering and peeling). Call your doctor at once if you have:  a light-headed feeling, like you might pass out;  ringing in your ears, hearing loss;  muscle spasms or contractions;  pale skin, easy bruising, unusual bleeding;  high blood sugar --increased thirst, increased urination, dry mouth, fruity breath odor;  kidney problems --little or no urination, swelling in your feet or ankles, feeling tired or short of breath;  signs of liver or pancreas problems --loss of appetite, upper stomach pain (that may spread to your back), nausea or vomiting, dark urine, jaundice (yellowing of the skin or eyes); or  signs of an electrolyte imbalance --dry mouth, thirst, weakness, drowsiness, feeling jittery or unsteady, vomiting, irregular heartbeats, fluttering in your chest, numbness or tingling, muscle cramps, muscle weakness or limp feeling. Common side effects may include:  diarrhea, constipation, loss of appetite;  numbness or tingling;  headache, dizziness; or  blurred vision. This is not a complete list of side effects and others may occur. Call your doctor for medical advice about side effects.  You may report side effects to FDA at 1-800-FDA-1088. What other drugs will affect furosemide? Sometimes it is not safe to use certain medications at the same time. Some drugs can affect your blood levels of other drugs you take, which may increase side effects or make the medications less effective. If you also take sucralfate, take your furosemide dose 2 hours before or 2 hours after you take sucralfate. Tell your doctor about all your other medicines, especially:  another diuretic, especially ethacrynic acid;  chloral hydrate;  lithium;  phenytoin;  an injected antibiotic;  cancer medicine, such as cisplatin;  heart or blood pressure medicine; or  salicylates such as aspirin, Nuprin Backache Caplet, Kaopectate, KneeRelief, Pamprin Cramp Formula, Pepto-Bismol, Tricosal, Trilisate, and others. This list is not complete. Other drugs may affect furosemide, including prescription and over-the-counter medicines, vitamins, and herbal products. Not all possible drug interactions are listed here. Where can I get more information? Your pharmacist can provide more information about furosemide. Remember, keep this and all other medicines out of the reach of children, never share your medicines with others, and use this medication only for the indication prescribed. Every effort has been made to ensure that the information provided by [address] is accurate, up-to-date, and complete, but no guarantee is made to that effect. Drug information contained herein may be time sensitive. [facility] information has been compiled for use by healthcare practitioners and consumers in the [place] and therefore [facility] does not warrant that uses outside of the [place] are appropriate, unless specifically indicated otherwise. [facility] information does not endorse drugs, diagnose patients or recommend therapy.  [facility]'s drug information is an informational resource designed to assist licensed healthcare practitioners in caring for their patients and/or to serve consumers viewing this service as a supplement to, and not a substitute for, the expertise, skill, knowledge and judgment of healthcare practitioners. The absence of a warning for a given drug or drug combination in no way should be construed to indicate that the drug or drug combination is safe, effective or appropriate for any given patient. [facility] does not assume any responsibility for any aspect of healthcare administered with the aid of information [facility] provides. The information contained herein is not intended to cover all possible uses, directions, precautions, warnings, drug interactions, allergic reactions, or adverse effects. If you have questions about the drugs you are taking, check with your doctor, nurse or pharmacist.  Copyright 4179-0298 [address]. Version: 16.01. Revision date: 5/22/2019. Care instructions adapted under license by [facility] ([facility]). If you have questions about a medical condition or this instruction, always ask your healthcare professional. [place] [address] any warranty or liability for your use of this information.

## 2023-01-26 NOTE — PROGRESS NOTES
Pt ambulatory to infusion center for Lasix IVP. VSS. Pt continues with dizziness, light-headedness, visual changes, SOB, MIX. Pt on O2 2L n/c. IV access obtained. IVP Lasix administered. Pt tolerated infusion without additional complaints. IV removed. Pt/spouse denied need for printed AVS. Pt to return next week for same treatment.

## 2023-01-30 ENCOUNTER — TELEPHONE (OUTPATIENT)
Dept: CARDIOLOGY CLINIC | Age: 53
End: 2023-01-30

## 2023-01-30 NOTE — TELEPHONE ENCOUNTER
Procedure : Tooth removal     Procedure Date : 1.30.23    Surgeon : n/a    Anesteshia : n/a    Hold Recommendations : n/a    Fax Clearance : patient  will  the letter. Please call when ready   [phone]       Patient is needing clearance to have a tooth removed.

## 2023-02-01 DIAGNOSIS — E55.9 VITAMIN D DEFICIENCY: ICD-10-CM

## 2023-02-01 DIAGNOSIS — M06.00 SERONEGATIVE RHEUMATOID ARTHRITIS (HCC): ICD-10-CM

## 2023-02-02 ENCOUNTER — HOSPITAL ENCOUNTER (OUTPATIENT)
Dept: ONCOLOGY | Age: 53
Setting detail: INFUSION SERIES
Discharge: HOME | End: 2023-02-02
Payer: COMMERCIAL

## 2023-02-02 VITALS
TEMPERATURE: 96.4 F | OXYGEN SATURATION: 94 % | DIASTOLIC BLOOD PRESSURE: 70 MMHG | RESPIRATION RATE: 18 BRPM | SYSTOLIC BLOOD PRESSURE: 108 MMHG | HEART RATE: 63 BPM

## 2023-02-02 DIAGNOSIS — I50.32 CHRONIC HEART FAILURE WITH PRESERVED EJECTION FRACTION (HCC): Primary | ICD-10-CM

## 2023-02-02 DIAGNOSIS — I50.32 CHRONIC DIASTOLIC CONGESTIVE HEART FAILURE (HCC): ICD-10-CM

## 2023-02-02 PROCEDURE — 2580000003 HC RX 258: Performed by: INTERNAL MEDICINE

## 2023-02-02 PROCEDURE — 99211 OFF/OP EST MAY X REQ PHY/QHP: CPT

## 2023-02-02 PROCEDURE — 96374 THER/PROPH/DIAG INJ IV PUSH: CPT

## 2023-02-02 PROCEDURE — 6360000002 HC RX W HCPCS: Performed by: INTERNAL MEDICINE

## 2023-02-02 RX ORDER — LUBIPROSTONE 24 UG/1
CAPSULE ORAL
Qty: 60 CAPSULE | Refills: 5 | Status: SHIPPED | OUTPATIENT
Start: 2023-02-02

## 2023-02-02 RX ORDER — FUROSEMIDE 10 MG/ML
120 INJECTION INTRAMUSCULAR; INTRAVENOUS ONCE
Status: CANCELLED
Start: 2023-02-09 | End: 2023-02-09

## 2023-02-02 RX ORDER — MELATONIN
Qty: 90 TABLET | Refills: 3 | Status: SHIPPED | OUTPATIENT
Start: 2023-02-02

## 2023-02-02 RX ORDER — SIMVASTATIN 20 MG
TABLET ORAL
Qty: 90 TABLET | Refills: 0 | Status: SHIPPED | OUTPATIENT
Start: 2023-02-02

## 2023-02-02 RX ORDER — SODIUM CHLORIDE 0.9 % (FLUSH) 0.9 %
5-40 SYRINGE (ML) INJECTION ONCE
Status: CANCELLED
Start: 2023-02-09 | End: 2023-02-09

## 2023-02-02 RX ORDER — SODIUM CHLORIDE 0.9 % (FLUSH) 0.9 %
5-40 SYRINGE (ML) INJECTION ONCE
Status: COMPLETED | OUTPATIENT
Start: 2023-02-02 | End: 2023-02-02

## 2023-02-02 RX ORDER — LEFLUNOMIDE 20 MG/1
TABLET ORAL
Qty: 90 TABLET | Refills: 0 | OUTPATIENT
Start: 2023-02-02

## 2023-02-02 RX ORDER — FUROSEMIDE 10 MG/ML
120 INJECTION INTRAMUSCULAR; INTRAVENOUS ONCE
Status: COMPLETED | OUTPATIENT
Start: 2023-02-02 | End: 2023-02-02

## 2023-02-02 RX ADMIN — SODIUM CHLORIDE, PRESERVATIVE FREE 10 ML: 5 INJECTION INTRAVENOUS at 14:27

## 2023-02-02 RX ADMIN — FUROSEMIDE 120 MG: 10 INJECTION, SOLUTION INTRAMUSCULAR; INTRAVENOUS at 14:25

## 2023-02-02 NOTE — TELEPHONE ENCOUNTER
Future Appointments    Encounter Information    Provider Department Appt Notes   4/5/2023 Ana Thomas, [address] Internal [facility] 3 months     Past Visits    Date Provider Specialty Visit Type Primary Dx   01/04/2023 Ana Thomas DO Internal Medicine Office Visit Shortness of breath

## 2023-02-02 NOTE — PROGRESS NOTES
Pt ambulatory to infusion center for Lasix IVP. VSS. Pt continues with dizziness, light-headedness, visual changes, SOB, MIX. Patient above 92 % on Room Air. IV access obtained. IVP Lasix administered. Pt tolerated infusion without additional complaints. IV removed. Pt to return next week for same treatment.

## 2023-02-09 ENCOUNTER — HOSPITAL ENCOUNTER (OUTPATIENT)
Dept: ONCOLOGY | Age: 53
Setting detail: INFUSION SERIES
Discharge: HOME | End: 2023-02-09
Payer: COMMERCIAL

## 2023-02-09 VITALS
TEMPERATURE: 96.8 F | DIASTOLIC BLOOD PRESSURE: 70 MMHG | OXYGEN SATURATION: 97 % | SYSTOLIC BLOOD PRESSURE: 123 MMHG | RESPIRATION RATE: 18 BRPM | HEART RATE: 63 BPM

## 2023-02-09 DIAGNOSIS — I50.32 CHRONIC DIASTOLIC CONGESTIVE HEART FAILURE (HCC): ICD-10-CM

## 2023-02-09 DIAGNOSIS — I50.32 CHRONIC HEART FAILURE WITH PRESERVED EJECTION FRACTION (HCC): Primary | ICD-10-CM

## 2023-02-09 PROCEDURE — 2580000003 HC RX 258: Performed by: INTERNAL MEDICINE

## 2023-02-09 PROCEDURE — 99211 OFF/OP EST MAY X REQ PHY/QHP: CPT

## 2023-02-09 PROCEDURE — 6360000002 HC RX W HCPCS: Performed by: INTERNAL MEDICINE

## 2023-02-09 PROCEDURE — 96374 THER/PROPH/DIAG INJ IV PUSH: CPT

## 2023-02-09 RX ORDER — SODIUM CHLORIDE 0.9 % (FLUSH) 0.9 %
5-40 SYRINGE (ML) INJECTION ONCE
Status: CANCELLED
Start: 2023-02-16 | End: 2023-02-16

## 2023-02-09 RX ORDER — FUROSEMIDE 10 MG/ML
120 INJECTION INTRAMUSCULAR; INTRAVENOUS ONCE
Status: COMPLETED | OUTPATIENT
Start: 2023-02-09 | End: 2023-02-09

## 2023-02-09 RX ORDER — SODIUM CHLORIDE 0.9 % (FLUSH) 0.9 %
5-40 SYRINGE (ML) INJECTION ONCE
Status: COMPLETED | OUTPATIENT
Start: 2023-02-09 | End: 2023-02-09

## 2023-02-09 RX ORDER — FUROSEMIDE 10 MG/ML
120 INJECTION INTRAMUSCULAR; INTRAVENOUS ONCE
Status: CANCELLED
Start: 2023-02-16 | End: 2023-02-16

## 2023-02-09 RX ADMIN — Medication 10 ML: at 14:08

## 2023-02-09 RX ADMIN — FUROSEMIDE 120 MG: 10 INJECTION, SOLUTION INTRAMUSCULAR; INTRAVENOUS at 14:19

## 2023-02-09 NOTE — PROGRESS NOTES
Pt ambulatory to infusion center for Lasix IVP. Pt has intermittent dizziness. Denies falling. Gait currently is steady. IV access obtained. IVP Lasix administered; 120 mg IVP given over 12 minutes. IV removed. Pt/spouse denied need for printed AVS. Pt to return next week for same treatment.

## 2023-02-15 DIAGNOSIS — M06.00 SERONEGATIVE RHEUMATOID ARTHRITIS (HCC): ICD-10-CM

## 2023-02-16 ENCOUNTER — TELEPHONE (OUTPATIENT)
Dept: ENDOCRINOLOGY | Age: 53
End: 2023-02-16

## 2023-02-16 ENCOUNTER — HOSPITAL ENCOUNTER (OUTPATIENT)
Dept: ONCOLOGY | Age: 53
Setting detail: INFUSION SERIES
Discharge: HOME | End: 2023-02-16
Payer: COMMERCIAL

## 2023-02-16 VITALS
SYSTOLIC BLOOD PRESSURE: 107 MMHG | DIASTOLIC BLOOD PRESSURE: 59 MMHG | TEMPERATURE: 96.9 F | HEART RATE: 65 BPM | RESPIRATION RATE: 16 BRPM | OXYGEN SATURATION: 96 %

## 2023-02-16 DIAGNOSIS — I50.32 CHRONIC HEART FAILURE WITH PRESERVED EJECTION FRACTION (HCC): Primary | ICD-10-CM

## 2023-02-16 DIAGNOSIS — I50.32 CHRONIC DIASTOLIC CONGESTIVE HEART FAILURE (HCC): ICD-10-CM

## 2023-02-16 LAB
ANION GAP SERPL CALCULATED.3IONS-SCNC: 12 MMOL/L (ref 3–16)
BUN BLDV-MCNC: 64 MG/DL (ref 7–20)
CALCIUM SERPL-MCNC: 9 MG/DL (ref 8.3–10.6)
CHLORIDE BLD-SCNC: 89 MMOL/L (ref 99–110)
CO2: 31 MMOL/L (ref 21–32)
CREAT SERPL-MCNC: 1.7 MG/DL (ref 0.6–1.1)
GFR SERPL CREATININE-BSD FRML MDRD: 35 ML/MIN/{1.73_M2}
GLUCOSE BLD-MCNC: 160 MG/DL (ref 70–99)
POTASSIUM SERPL-SCNC: 4.6 MMOL/L (ref 3.5–5.1)
PRO-BNP: 110 PG/ML (ref 0–124)
SODIUM BLD-SCNC: 132 MMOL/L (ref 136–145)

## 2023-02-16 PROCEDURE — 6360000002 HC RX W HCPCS: Performed by: INTERNAL MEDICINE

## 2023-02-16 PROCEDURE — 2580000003 HC RX 258: Performed by: INTERNAL MEDICINE

## 2023-02-16 PROCEDURE — 96374 THER/PROPH/DIAG INJ IV PUSH: CPT

## 2023-02-16 PROCEDURE — 80048 BASIC METABOLIC PNL TOTAL CA: CPT

## 2023-02-16 PROCEDURE — 99211 OFF/OP EST MAY X REQ PHY/QHP: CPT

## 2023-02-16 PROCEDURE — 83880 ASSAY OF NATRIURETIC PEPTIDE: CPT

## 2023-02-16 RX ORDER — FUROSEMIDE 10 MG/ML
120 INJECTION INTRAMUSCULAR; INTRAVENOUS ONCE
Status: CANCELLED
Start: 2023-02-17 | End: 2023-02-17

## 2023-02-16 RX ORDER — SODIUM CHLORIDE 0.9 % (FLUSH) 0.9 %
5-40 SYRINGE (ML) INJECTION ONCE
Status: COMPLETED | OUTPATIENT
Start: 2023-02-16 | End: 2023-02-16

## 2023-02-16 RX ORDER — FUROSEMIDE 10 MG/ML
120 INJECTION INTRAMUSCULAR; INTRAVENOUS ONCE
Status: COMPLETED | OUTPATIENT
Start: 2023-02-16 | End: 2023-02-16

## 2023-02-16 RX ORDER — SODIUM CHLORIDE 0.9 % (FLUSH) 0.9 %
5-40 SYRINGE (ML) INJECTION ONCE
Status: CANCELLED
Start: 2023-02-17 | End: 2023-02-17

## 2023-02-16 RX ORDER — LEFLUNOMIDE 20 MG/1
TABLET ORAL
Qty: 90 TABLET | Refills: 0 | Status: SHIPPED | OUTPATIENT
Start: 2023-02-16

## 2023-02-16 RX ADMIN — FUROSEMIDE 120 MG: 10 INJECTION, SOLUTION INTRAMUSCULAR; INTRAVENOUS at 14:25

## 2023-02-16 RX ADMIN — Medication 10 ML: at 14:25

## 2023-02-16 NOTE — TELEPHONE ENCOUNTER
Received refill request for torsemide (DEMADEX) 100 MG tablet from [facility].      Last OV: 1- NPKV    Next OV: 3- ARETHA    Last Labs: 1- BMP    Last Filled:  11- ARETHA

## 2023-02-17 NOTE — TELEPHONE ENCOUNTER
(Graciela Govea) - [phone]    Listed continuation of treatment. Attached last office note to request for review.       PENDING

## 2023-02-20 ENCOUNTER — TELEPHONE (OUTPATIENT)
Dept: CARDIOLOGY CLINIC | Age: 53
End: 2023-02-20

## 2023-02-20 NOTE — TELEPHONE ENCOUNTER
From Result Notes:  Viktor Aguirre MD  P [facility] Cardio Heart Failure  Please call patient and let her know that her BNP level is normal.  It has not been \"normal\" in ages. This is good news. Kidney function and potassium all look good.   ARETHA

## 2023-02-20 NOTE — TELEPHONE ENCOUNTER
I spoke to Zach Steven Peter, went over results. He reports that today, Crow gained 3# overnight (201# yesterday, 204# this morning). She has more swelling today, feels she is holding fluid. I confirmed her meds: torsemide 100mg bid, spironolactone 100/50mg daily, metolazone 2.5mg 2x week (took a dose last Thursday and yesterday).

## 2023-02-20 NOTE — TELEPHONE ENCOUNTER
If she is gaining fluid weight that rapidly, she is drinking too much fluid. I would not change any medications at this time.   ARETHA

## 2023-02-21 RX ORDER — TORSEMIDE 100 MG/1
TABLET ORAL
Qty: 135 TABLET | Refills: 0 | Status: SHIPPED | OUTPATIENT
Start: 2023-02-21

## 2023-02-21 NOTE — TELEPHONE ENCOUNTER
I had spoken to Mr. Viola Apley yesterday. He said she eats ice but has cut back. I advised him to call today (Tuesday 2/21/23) if she was worsening. He understands not to change her meds at this point. We have not received any calls back.

## 2023-02-23 ENCOUNTER — TELEPHONE (OUTPATIENT)
Dept: ENDOCRINOLOGY | Age: 53
End: 2023-02-23

## 2023-02-23 ENCOUNTER — HOSPITAL ENCOUNTER (OUTPATIENT)
Dept: ONCOLOGY | Age: 53
Setting detail: INFUSION SERIES
Discharge: HOME | End: 2023-02-23
Payer: COMMERCIAL

## 2023-02-23 VITALS
RESPIRATION RATE: 16 BRPM | SYSTOLIC BLOOD PRESSURE: 122 MMHG | OXYGEN SATURATION: 96 % | DIASTOLIC BLOOD PRESSURE: 67 MMHG | TEMPERATURE: 98.6 F | HEART RATE: 71 BPM

## 2023-02-23 DIAGNOSIS — I50.32 CHRONIC HEART FAILURE WITH PRESERVED EJECTION FRACTION (HCC): Primary | ICD-10-CM

## 2023-02-23 DIAGNOSIS — I50.32 CHRONIC DIASTOLIC CONGESTIVE HEART FAILURE (HCC): ICD-10-CM

## 2023-02-23 DIAGNOSIS — E03.2 HYPOTHYROIDISM DUE TO MEDICATION: ICD-10-CM

## 2023-02-23 PROCEDURE — 99211 OFF/OP EST MAY X REQ PHY/QHP: CPT

## 2023-02-23 PROCEDURE — 6360000002 HC RX W HCPCS: Performed by: INTERNAL MEDICINE

## 2023-02-23 PROCEDURE — 2580000003 HC RX 258: Performed by: INTERNAL MEDICINE

## 2023-02-23 PROCEDURE — 96374 THER/PROPH/DIAG INJ IV PUSH: CPT

## 2023-02-23 RX ORDER — FUROSEMIDE 10 MG/ML
120 INJECTION INTRAMUSCULAR; INTRAVENOUS ONCE
Status: CANCELLED
Start: 2023-03-02 | End: 2023-03-02

## 2023-02-23 RX ORDER — LEVOTHYROXINE SODIUM 0.15 MG/1
TABLET ORAL
Qty: 90 TABLET | Refills: 1 | Status: SHIPPED | OUTPATIENT
Start: 2023-02-23

## 2023-02-23 RX ORDER — SODIUM CHLORIDE 0.9 % (FLUSH) 0.9 %
5-40 SYRINGE (ML) INJECTION ONCE
Status: COMPLETED | OUTPATIENT
Start: 2023-02-23 | End: 2023-02-23

## 2023-02-23 RX ORDER — SODIUM CHLORIDE 0.9 % (FLUSH) 0.9 %
5-40 SYRINGE (ML) INJECTION ONCE
Status: CANCELLED
Start: 2023-03-02 | End: 2023-03-02

## 2023-02-23 RX ORDER — FUROSEMIDE 10 MG/ML
120 INJECTION INTRAMUSCULAR; INTRAVENOUS ONCE
Status: COMPLETED | OUTPATIENT
Start: 2023-02-23 | End: 2023-02-23

## 2023-02-23 RX ADMIN — Medication 10 ML: at 14:31

## 2023-02-23 RX ADMIN — FUROSEMIDE 120 MG: 10 INJECTION, SOLUTION INTRAMUSCULAR; INTRAVENOUS at 14:31

## 2023-02-23 NOTE — PROGRESS NOTES
Pt ambulatory to infusion center for Lasix IVP. IV access obtained. IVP Lasix administered; 120 mg IVP given over 20 minutes. IV removed. Pt/spouse denied need for printed AVS. Pt to return next week for same treatment. Pt verbalizing frustration with swelling 3 to 4 days after treatment, Pt called MD on 2/20/23 and re verbalized note to Patient. Pt instructed to keep daily diary of weights,oral intake and solid food. Verbalized understanding

## 2023-03-01 ENCOUNTER — OFFICE VISIT (OUTPATIENT)
Dept: RHEUMATOLOGY | Age: 53
End: 2023-03-01
Payer: COMMERCIAL

## 2023-03-01 DIAGNOSIS — M15.9 PRIMARY OSTEOARTHRITIS INVOLVING MULTIPLE JOINTS: ICD-10-CM

## 2023-03-01 DIAGNOSIS — R76.8 POSITIVE ANA (ANTINUCLEAR ANTIBODY): ICD-10-CM

## 2023-03-01 DIAGNOSIS — I10 ESSENTIAL HYPERTENSION: ICD-10-CM

## 2023-03-01 DIAGNOSIS — N28.9 RENAL INSUFFICIENCY: Chronic | ICD-10-CM

## 2023-03-01 DIAGNOSIS — R06.02 SOB (SHORTNESS OF BREATH): Chronic | ICD-10-CM

## 2023-03-01 DIAGNOSIS — M51.36 DDD (DEGENERATIVE DISC DISEASE), LUMBAR: ICD-10-CM

## 2023-03-01 DIAGNOSIS — M06.00 SERONEGATIVE RHEUMATOID ARTHRITIS (HCC): Primary | ICD-10-CM

## 2023-03-01 DIAGNOSIS — M1A.09X0 IDIOPATHIC CHRONIC GOUT OF MULTIPLE SITES WITHOUT TOPHUS: ICD-10-CM

## 2023-03-01 DIAGNOSIS — I25.10 CORONARY ARTERY DISEASE INVOLVING NATIVE CORONARY ARTERY OF NATIVE HEART WITHOUT ANGINA PECTORIS: Chronic | ICD-10-CM

## 2023-03-01 DIAGNOSIS — I50.22 SYSTOLIC CHF, CHRONIC (HCC): ICD-10-CM

## 2023-03-01 DIAGNOSIS — M50.30 DDD (DEGENERATIVE DISC DISEASE), CERVICAL: ICD-10-CM

## 2023-03-01 DIAGNOSIS — I50.30 (HFPEF) HEART FAILURE WITH PRESERVED EJECTION FRACTION (HCC): Chronic | ICD-10-CM

## 2023-03-01 DIAGNOSIS — Z79.899 HIGH RISK MEDICATION USE: ICD-10-CM

## 2023-03-01 PROCEDURE — G8427 DOCREV CUR MEDS BY ELIG CLIN: HCPCS | Performed by: INTERNAL MEDICINE

## 2023-03-01 PROCEDURE — 1036F TOBACCO NON-USER: CPT | Performed by: INTERNAL MEDICINE

## 2023-03-01 PROCEDURE — G8417 CALC BMI ABV UP PARAM F/U: HCPCS | Performed by: INTERNAL MEDICINE

## 2023-03-01 PROCEDURE — 3017F COLORECTAL CA SCREEN DOC REV: CPT | Performed by: INTERNAL MEDICINE

## 2023-03-01 PROCEDURE — G8482 FLU IMMUNIZE ORDER/ADMIN: HCPCS | Performed by: INTERNAL MEDICINE

## 2023-03-01 PROCEDURE — 99214 OFFICE O/P EST MOD 30 MIN: CPT | Performed by: INTERNAL MEDICINE

## 2023-03-01 NOTE — PROGRESS NOTES
3/1/2023  Patient Name: Monique Mosher  : 1970  Medical Record: [id number]      ASSESSMENT AND PLAN    Assessment/Plan:      ASSESSMENT:    1. Seronegative rheumatoid arthritis ([facility] [address]    2. Idiopathic chronic gout of multiple sites without tophus    3. DDD (degenerative disc disease), lumbar    4. High risk medication use    5. Positive BRENNA (antinuclear antibody)    6. Primary osteoarthritis involving multiple joints    7. DDD (degenerative disc disease), cervical        PLAN:     Diagnoses and all orders for this visit:    Seronegative rheumatoid arthritis ([facility] [address]    Idiopathic chronic gout of multiple sites without tophus    DDD (degenerative disc disease), lumbar    High risk medication use  -     ALT; Standing  -     AST; Standing  -     CBC with Auto Differential; Standing  -     Creatinine; Standing    Positive BRENNA (antinuclear antibody)    Primary osteoarthritis involving multiple joints    DDD (degenerative disc disease), cervical  Seronegative rheumatoid arthritis-diagnosed more than 10 years ago. RF, CCP negative. HLA-B27, hepatitis panel negative. Hand x-rays with osteoarthritis/degenerative changes  Continues to be symptomatic  I did not appreciate any synovitis on joint exam  Hand swelling most likely multifactorial [fluid retention, diabetes, medication side effects]  She will continue sulfasalazine 500 mg twice a day   Continue leflunomide 20 mg daily  Previous history of methotrexate [hair loss] and Plaquenil [loss of efficacy] use. Gout-last uric acid 3.7. No recent flareups of gout. Off of colchicine.   Continue Uloric 80 mg daily     Degenerative disc disease in the lumbar and cervical spine-   Lumba  and cervical spine with multilevel degenerative changes   advised to continue follow-up with a spine specialist/pain specialist.  Follows pain specialist.    Continue Lyrica 75 mg twice a day  Percocet and oxycodone prescribed by pain specialist    Low titer positive BRENNA-1: 80 speckled pattern  Implications of low titer positive BRENNA were discussed with patient. About 15-20% of normal healthy individuals at her age may have low titer positive BRENNA of unclear clinical significance. dsDNA, anti-Piedra, RNP, SSA/SSB is negative      The patient indicates understanding of these issues and agrees with the plan. No follow-ups on file. The risks and benefits of my recommendations, as well as other treatment options, benefits and side effects werediscussed with the patient. All questions were answered. MEDICATIONS  Current Outpatient Medications   Medication Sig Dispense Refill    torsemide (DEMADEX) 100 MG tablet TAKE 1 TABLET BY MOUTH IN THE MORNING AND 1/2 TABLET IN THE EVENING. 135 tablet 0    leflunomide (ARAVA) 20 MG tablet TAKE 1 TABLET BY MOUTH EVERY DAY 90 tablet 0    levothyroxine (SYNTHROID) 150 MCG tablet TAKE 1 TABLET BY MOUTH IN THE MORNING before breakfast 90 tablet 1    simvastatin (ZOCOR) 20 MG tablet TAKE 1 TABLET BY MOUTH one time daily at night 90 tablet 0    vitamin D3 (CHOLECALCIFEROL) 25 MCG (1000 UT) TABS tablet TAKE 3 TABLETS BY MOUTH ONE TIME A DAY 90 tablet 3    lubiprostone (AMITIZA) 24 MCG capsule TAKE 1 CAPSULE BY MOUTH TWO TIMES A DAY WITH MEALS 60 capsule 5    ferrous sulfate (FEROSUL) 325 (65 Fe) MG tablet TAKE 1 TABLET BY MOUTH TWO TIMES A DAY WITH MEALS 180 tablet 0    mineral oil-hydrophilic petrolatum (HYDROPHOR) ointment Apply topically 4 times daily as needed to hands and feet.  454 g 5    Compression Sleeves Left arm 2 each 0    Elastic Bandages & Supports (COMPRESSION & SUPPORT GLOVES L) MISC 1 each by Does not apply route daily Left hand 2 each 0    Elastic Bandages & Supports (MEDICAL COMPRESSION STOCKINGS) MISC 2 each by Does not apply route daily On am/off HS. 2 each 0    Urea 40 % LOTN Apply to feet nightly and cover with Aquaphor 325 mL 5    sulfaSALAzine (AZULFIDINE) 500 MG tablet TAKE 1 TABLET BY MOUTH TWO TIMES A  tablet 0 spironolactone (ALDACTONE) 50 MG tablet TAKE 2 TABLETS BY MOUTH IN THE MORNING and 1 tablet in the evening 270 tablet 0    omeprazole (PRILOSEC) 20 MG delayed release capsule TAKE 1 CAPSULE BY MOUTH TWO TIMES A DAY 60 capsule 5    nitroGLYCERIN (NITROSTAT) 0.4 MG SL tablet PLACE ONE TABLET UNDER TONGUE AS NEEDED FOR CHEST PAIN, MAY REPEAT EVERY 5 MINUTES AS NEEDED UP TO A MAX OF 3 TABLETS. IF NO RELIEF AFTER 1 DOSE, CALL 911. 25 tablet 2    metoprolol tartrate (LOPRESSOR) 25 MG tablet TAKE 1 TABLET BY MOUTH TWO TIMES A  tablet 3    Febuxostat 80 MG TABS TAKE 1 TABLET BY MOUTH EVERY DAY 90 tablet 1    pregabalin (LYRICA) 75 MG capsule TAKE 1 CAPSULE BY MOUTH TWO TIMES A DAY 60 capsule 2    SM SENNA-S 8.6-50 MG per tablet TAKE 2 TABLETS BY MOUTH TWO TIMES A  tablet 5    solifenacin (VESICARE) 10 MG tablet Take 1 tablet by mouth daily 90 tablet 3    meclizine (ANTIVERT) 25 MG tablet Take 1 tablet by mouth 3 times daily as needed for Dizziness 30 tablet 2    montelukast (SINGULAIR) 10 MG tablet Take 1 tablet by mouth nightly 30 tablet 5    TRESIBA FLEXTOUCH 200 UNIT/ML SOPN INJECT 200 UNITS SUBCUTANEOUSLY ONE TIME DAILY 54 mL 3    Continuous Blood Gluc Sensor (FREESTYLE EMERALD 2 SENSOR) MISC Change every 14 days 2 each 5    insulin aspart (NOVOLOG FLEXPEN) 100 UNIT/ML injection pen inject 30 to 50 units into the skin three time daily before meals 60 mL 3    Insulin Pen Needle (B-D UF III MINI PEN NEEDLES) 31G X 5 MM MISC use five times daily with insulin 200 each 5    STEGLATRO 5 MG TABS TAKE 1 TABLET BY MOUTH EVERY DAY 90 tablet 1    Blood Glucose Monitoring Suppl (ONETOUCH VERIO) w/Device KIT Use to check glucose 4 times daily.  1 kit 0    Lidocaine 5 % CREA Apply to feet QID prn pain for peripheral neuropathy 30 g 5    butalbital-acetaminophen-caffeine (FIORICET, ESGIC) -40 MG per tablet Take 1 tablet by mouth every 6 hours as needed for Headaches 30 tablet 1    ranolazine (RANEXA) 500 MG extended release tablet TAKE 1 TABLET BY MOUTH TWO TIMES A  tablet 3    Compression Stockings MISC by Does not apply route Zippered stockings for daily use on lower extremities (do not wear at night). 20-30mmHg. 1 each 1    ONETOUCH VERIO strip use to test blood sugar 5 times daily 200 strip 5    metOLazone (ZAROXOLYN) 2.5 MG tablet TAKE 1 TABLET BY MOUTH TWO TIMES A WEEK AS NEEDED FOR WEIGHT OVER 180 POUNDS. DO NOT TAKE 2 DAYS IN A ROW. (Patient taking differently: Take 2.5 mg by mouth twice a week) 24 tablet 0    Continuous Blood Gluc  (FREESTYLE EMERALD 2 READER) MINDY To check glucose levels 1 each 0    albuterol (PROVENTIL) (2.5 MG/3ML) 0.083% nebulizer solution Take 3 mLs by nebulization every 6 hours as needed for Wheezing or Shortness of Breath 120 each 1    polyethylene glycol (GLYCOLAX) 17 GM/SCOOP powder TAKE 17 GRAMS (1 CAPFUL) BY MOUTH NIGHTLY 510 g 0    LINZESS 290 MCG CAPS capsule TAKE 1 CAPSULE BY MOUTH ONE TIME A DAY FOR 90 DAYS      REXULTI 2 MG TABS tablet Take 2 mg by mouth at bedtime       methocarbamol (ROBAXIN-750) 750 MG tablet Take 1 tablet by mouth 3 times daily as needed (pain and spasm) 30 tablet 0    nystatin (MYCOSTATIN) 127204 UNIT/ML suspension Take 5 mLs by mouth 4 times daily 500 mL 1    traZODone (DESYREL) 50 MG tablet Take 1 tablet by mouth nightly Take it on the day of sleep study 1 tablet 0    loratadine (CLARITIN) 10 MG tablet TAKE 1 TABLET BY MOUTH ONE TIME A DAY  30 tablet 5    ketoconazole (NIZORAL) 2 % shampoo Apply topically daily as needed. 120 mL 1    diazePAM (VALIUM) 5 MG tablet Take 5 mg by mouth 2 times daily as needed for Anxiety.       OXYGEN Inhale 2 L into the lungs nightly Sometimes with activity      oxyCODONE (OXYCONTIN) 20 MG extended release tablet Take 20 mg by mouth every 12 hours.      aspirin 81 MG EC tablet Take 81 mg by mouth daily      benztropine (COGENTIN) 1 MG tablet Take 1 mg by mouth nightly       oxyCODONE-acetaminophen (PERCOCET)  MG  per tablet Take 1 tablet by mouth every 4 hours as needed for Pain . Earliest Fill Date: 6/8/17 100 tablet 0    duloxetine (CYMBALTA) 60 MG capsule Take 60 mg by mouth 2 times daily. No current facility-administered medications for this visit. ALLERGIES  Allergies   Allergen Reactions    Iv Dye [Iodides] Anaphylaxis     allergic to ct scan dye, not the MRI    Diazepam Other (See Comments)    Insulin Glargine Other (See Comments)     High blood sugar     Trazodone And Nefazodone Other (See Comments)     Makes patient feel very sluggish         Comments  No specialty comments available. Background history:  Isaac Mortensen is a 47 y.o. female with past medical history of hypertension, diabetes, congestive heart failure, COPD, depression, hypothyroidism, chronic kidney disease, degenerative disc disease in the lumbar spine, breast cancer status post chemoradiation, seronegative rheumatoid arthritis, gout who is being seen for follow up evaluation of  seronegative rheumatoid arthritis and gout. She was seeing Dr. Sharon Valiente  at [facility].  She was last seen in January 2021. She was diagnosed with seronegative rheumatoid arthritis several years ago. She was on methotrexate and Plaquenil in the past which was stopped due to lack of efficacy. She is now on leflunomide 10 mg daily and sulfasalazine 500 mg twice a day. She was on sulfasalazine 1000 mg twice a day which was slowly tapered down to 500 mg twice a day since it was not helping much. She continues to have pain in the joints especially with weather changes. She has swelling in the hands. She has stiffness in the joints that can last all day long. She denies psoriasis, inflammatory bowel disease, inflammatory back pain, dactylitis, enthesitis, tenosynovitis or uveitis. She denies fever, weight loss or swollen glands. She denies family history of rheumatoid arthritis.   She has degenerative disc disease in the lumbar and cervical spine.  She has low chronic low back pain. Back pain gets worse with activity and improves with rest.  Back pain radiates to the left lower extremity. She denies bowel or bladder dysfunction, saddle anesthesia. She sees a pain specialist and receives epidural spinal injections. She also has a gout which was diagnosed 2 years ago. She is on Uloric 80 mg daily and colchicine 0.6 mg daily. She is unable to go off of colchicine due to frequent flareups. She has lymphedema of the left arm after she underwent breast surgery with lymph node resection and chemoradiation. She has been on tamoxifen for several years. She is off of tamoxifen for past 2 years. She has not had any recurrence of breast cancer. Data reviewed: Reviewed notes by Dr. Violet Do from January 2021 as mentioned in HPI. Chronic rheumatoid arthritis, gout, lymphedema, CKD. Taper of sulfasalazine and continue Arava 10 mg daily. Continue Uloric 80 mg daily. Interim history: She presents for follow-up of seronegative rheumatoid arthritis, osteoarthritis, gout, chronic pain. She is on leflunomide 20 mg daily and sulfasalazine 500 mg twice a day. She continues complain of pain, swelling and stiffness in the joints. Symptoms get worse with weather changes. She has swelling in the hands. Stiffness can last all day long. She also has chronic neck and low back pain. She sees a pain specialist.  She has degenerative disc disease in the lumbar and cervical spine. She has received multiple epidural spinal injections in the past.  She also has gout. He has not had any flareup of gout. Last uric acid was 3.7. She is off of colchicine. She continues to take Uloric 80 mg daily. She denies any side effects with leflunomide and sulfasalazine. She denies any recent fevers or infections.   HPI  Review of Systems    REVIEW OF SYSTEMS: Positive for shortness of breath, wheezing, renal disease,, anxiety, depression, neuropathies, paresthesias  Constitutional: No unanticipated weight loss or fevers. Integumentary: No rash, photosensitivity, malar rash, livedo reticularis, alopecia and Raynaud's symptoms, sclerodactyly, skin tightening  Eyes: negative for visual disturbance and persistent redness, discharge from eyes   ENT: - No tinnitus, loss of hearing, vertigo, or recurrent ear infections.  - No history of nasal/oral ulcers. - No history of dry eyes/dry mouth  Cardiovascular: No history of pericarditis, chest pain or murmur or palpitations  Respiratory: No cough or history of interstitial lung disease. No history of pleurisy. No history of tuberculosis or atypical infections. Gastrointestinal: No history of heart burn, dysphagia or esophageal dysmotility. No change in bowel habits or any inflammatory bowel disease. Genitourinary: No history of miscarriages. Hematologic/Lymphatic: No abnormal bruising or bleeding, blood clots or swollen lymph nodes. Neurological: No history of headaches, seizure or focal weakness. No history of  hyperesthesias, facial droop, diplopia  Psychiatric: No history of bipolar disease  Endocrine: Denies any polyuria, polydipsia and osteoporosis  Allergic/Immunologic: No nasal congestion or hives. I have reviewed patients Past medical History, Social History and Family History as mentioned in her chart and this remains unchanged fromprevious.     Past Medical History:   Diagnosis Date    Anxiety     Anxiety and depression     Arthritis     not sure of specific type    Asthma     CAD (coronary artery disease)     Cancer ([facility] [address] 2007    left breast    Cerebral artery occlusion with cerebral infarction ([facility] [address]     2000, 2001    CHF (congestive heart failure) ([facility] [address] 2018    Chronic kidney disease     renal insufficency/to see Dr Dragan Gillette    Chronic pain     Constipation     COPD (chronic obstructive pulmonary disease) ([facility] [address]     Depression     Diabetes mellitus ([address]     Diabetic polyneuropathy associated with type 2 diabetes mellitus ([facility] [address] 2/2/2018    Dysthymia 2/2/2018    ESBL (extended spectrum beta-lactamase) producing bacteria infection 03/14/2018    urine    Fibromyalgia     Gastric ulcer, unspecified as acute or chronic, without mention of hemorrhage, perforation, or obstruction     GERD (gastroesophageal reflux disease)     Gout     HIGH CHOLESTEROL     Hypertension     Hypothyroidism     Pneumonia 3/25/2020    Pyelonephritis     Severe persistent asthma without complication 6/7/8528    Thyroid disease     TIA (transient ischemic attack)     with occasional left leg and hand weakness     Past Surgical History:   Procedure Laterality Date    BREAST SURGERY      left mastectomy    CARPAL TUNNEL RELEASE      Bilateral    FINGER CONTRACTURE SURGERY      HYSTERECTOMY (CERVIX STATUS UNKNOWN)  2005    USO    MASTECTOMY      TONSILLECTOMY      UPPER GASTROINTESTINAL ENDOSCOPY  2019    stretched esophagous      Social History     Socioeconomic History    Marital status:      Spouse name: Vesna Vu    Number of children: 3    Years of education: Not on file    Highest education level: Not on file   Occupational History    Occupation: disabled   Tobacco Use    Smoking status: Never    Smokeless tobacco: Never   Vaping Use    Vaping Use: Never used   Substance and Sexual Activity    Alcohol use: No    Drug use: No    Sexual activity: Not Currently   Other Topics Concern    Not on file   Social History Narrative    Not on file     Social Determinants of Health     Financial Resource Strain: Low Risk     Difficulty of Paying Living Expenses: Not hard at all   Food Insecurity: No Food Insecurity    Worried About Running Out of Food in the Last Year: Never true    Ran Out of Food in the Last Year: Never true   Transportation Needs: Not on file   Physical Activity: Not on file   Stress: Not on file   Social Connections: Not on file   Intimate Partner Violence: Not on file   Housing Stability: Not on file     Family History   Problem Relation Age of Onset    Asthma Other     Cancer Other     Depression Other     Diabetes Other     Hypertension Other     High Cholesterol Other     Migraines Other     Heart Attack Father 72         of MI    High Blood Pressure Mother     Diabetes Mother          PHYSICAL EXAM   There were no vitals filed for this visit.     Physical Exam  Constitutional:  Well developed, well nourished, no acute distress, non-toxic appearance   Musculoskeletal:    Ambulates without assistance, normal gait  Neck: Decreased range of motion, tenderness to palpation +  RIGHT  Swell  Tender  ROM  LEFT  Swell  Tender  ROM    DIP2  0  0   [place]  0  0   [place]   DIP3  0  0   [place]  0  0   [place]   DIP4  0  0   [place]  0  0   [place]   DIP5  0  0   [place]  0  0   [place]   PIP1  0  0  FULL   0  0  FULL    PIP2  0 0 FULL   0  0  FULL    PIP3  0 0 FULL   0  0  FULL    PIP4  0 0 FULL   0  0  FULL    PIP5  0 0 FULL   0  0  FULL    MCP1  0 0 FULL   0  0  FULL    MCP2  0 0 FULL   0  0  FULL    MCP3  0 0 FULL   0  0  FULL    MCP4  0 0 FULL   0  0  FULL    MCP5  0 0 FULL   0  0  FULL    Wrist  0  0  FULL   0  0  FULL    Elbow  0  0  FULL   0  0  FULL    Shouldr  0  0  decr  0  0  decr   Hip  0  0  decr  0  0  decr   Knee  0  0   crepitus  0  0   crepitus   Ankle  0  0  FULL   0  0  FULL    MTP1  0  0  FULL   0  0  FULL    MTP2  0  0  FULL   0  0  FULL    MTP3  0  0  FULL   0  0  FULL    MTP4  0  0  FULL   0  0  FULL    MTP5  0  0  FULL   0  0  FULL    IP1  0  0  FULL   0  0  FULL    IP2  0  0  FULL   0  0  FULL    IP3  0  0  FULL   0  0  FULL    IP4  0  0  FULL   0  0  FULL    IP5  0  0  FULL   0 0 FULL     Lymphedema of the left hand                                            Right            Left                                   Tender Tender    Costochondral  + +   Low cervical + +   suboccipital + +   Trapezius  + +   Supraspinatus  + +   Lateral epicondyl + +   Gluteal + +   Greater trochanter  + + knees + +     Back-tenderness to palpation bilateral lumbar spine and paraspinal area, bilateral SI joint tenderness  Eyes:  PERRL, extra ocular movements intact, conjunctiva normal   HEENT:  Atraumatic, normocephalic, external ears normal, oropharynx moist, no pharyngeal exudates. Respiratory:  No respiratory distress. Bilateral wheezing  GI:  Soft, nondistended, normal bowel sounds, nontender, noorganomegaly, no mass, no rebound, no guarding   :  No costovertebral angle tenderness   Integument:  Well hydrated, no rash or telangiectasias  Lymphatic:  No lymphadenopathy noted   Neurologic:   Alert & oriented x 3, CN 2-12 normal, no focal deficits noted. Sensations Intact. Muscle strength 5/5 proximally and distally in upper and lower extremities.    Psychiatric:  Speech and behavior appropriate   Extremities:2+ pedal edema in the right lower extremity up to the knee and 1+ pedal edema in the left lower extremity        LABS AND IMAGING  Outside data reviewed and in HPI    Lab Results   Component Value Date/Time    WBC 9.6 01/05/2023 02:28 PM    RBC 4.04 01/05/2023 02:28 PM    RBC 3.57 05/16/2017 03:47 PM    HGB 11.6 01/05/2023 02:28 PM    HCT 34.9 01/05/2023 02:28 PM     01/05/2023 02:28 PM    MCV 86.5 01/05/2023 02:28 PM    MCH 28.6 01/05/2023 02:28 PM    MCHC 33.1 01/05/2023 02:28 PM    RDW 14.1 01/05/2023 02:28 PM    SEGSPCT 63.9 07/08/2010 03:23 PM    BANDSPCT 1 01/11/2018 04:46 PM    LYMPHOPCT 23.2 01/05/2023 02:28 PM    LYMPHOPCT 26.2 05/16/2017 03:47 PM    MONOPCT 6.5 01/05/2023 02:28 PM    EOSPCT 0.8 07/08/2010 03:23 PM    BASOPCT 1.1 01/05/2023 02:28 PM    MONOSABS 0.6 01/05/2023 02:28 PM    LYMPHSABS 2.2 01/05/2023 02:28 PM    EOSABS 0.2 01/05/2023 02:28 PM    BASOSABS 0.1 01/05/2023 02:28 PM    DIFFTYPE Auto-K 07/08/2010 03:23 PM       Chemistry        Component Value Date/Time     (L) 02/16/2023 1423    K 4.6 02/16/2023 1423    K 4.4 10/29/2020 0429    CL 89 (L) 02/16/2023 1423    CO2 31 02/16/2023 1423    BUN 64 (H) 02/16/2023 1423    CREATININE 1.7 (H) 02/16/2023 1423        Component Value Date/Time    CALCIUM 9.0 02/16/2023 1423    ALKPHOS 133 (H) 08/22/2022 1525    AST 18 08/22/2022 1525    ALT 12 08/22/2022 1525    BILITOT <0.2 08/22/2022 1525          Lab Results   Component Value Date    SEDRATE 93 (H) 08/17/2021     Lab Results   Component Value Date    CRP 8.0 (H) 08/17/2021     Lab Results   Component Value Date/Time    BRENNA Negative 07/08/2010 03:23 PM     Lab Results   Component Value Date/Time    RF <10.0 08/10/2021 03:25 PM     Lab Results   Component Value Date/Time    BRENNA Negative 07/08/2010 03:23 PM    ANATITER 1:80 08/10/2021 03:25 PM     No results found for: DSDNAG, DSDNAIGGIFA  No results found for: SSAROAB, SSALAAB  No results found for: SMAB, RNPAB  No results found for: CENTABIGG  No results found for: C3, C4, ACE  Lab Results   Component Value Date/Time    VITD25 51.5 08/22/2022 10:30 AM     Lab Results   Component Value Date    LABURIC 3.7 06/14/2022     Lab Results   Component Value Date    YPECMQKR03 396 01/29/2020     Lab Results   Component Value Date    TSHFT4 0.45 08/10/2021    TSH 1.79 08/22/2022     Lab Results   Component Value Date    VITD25 51.5 08/22/2022     Bilateral hand, lumbar, cervical spine, SI joint x-rays 11/2/2021  Cervical spine: Mild endplate degenerative changes.  Minimal retrolisthesis   of C2 on C3 and anterolisthesis of C4 on C5.       Lumbar spine: Mild multilevel endplate degenerative changes.       SI joints: No radiographic evidence of sacroiliitis.  Mild bilateral SI joint   and hip joint degenerative changes.       Hands: Significant soft tissue swelling of the left hand and wrist.       Degenerative changes at multiple IP joints, severe at the left index finger   DIP joint with a questionable central erosion increasing suspicion for   erosive osteoarthritis.       Mild right 1st CMC joint degenerative changes.      ######################################################################    I thank you for giving me theopportunity to participate in [facility] care. If you have any questions or concerns please feel free to contact me. I look forward to following  [place] along with you. Electronically signed by: Doris Quintanilla MD, MD, 3/1/2023 2:07 PM    Documentation was done using voice recognition dragon software. Every effort was made to ensure accuracy;however, inadvertent unintentional computerized transcription errors may be present.

## 2023-03-02 ENCOUNTER — HOSPITAL ENCOUNTER (OUTPATIENT)
Dept: ONCOLOGY | Age: 53
Setting detail: INFUSION SERIES
Discharge: HOME | End: 2023-03-02
Payer: COMMERCIAL

## 2023-03-02 VITALS
DIASTOLIC BLOOD PRESSURE: 85 MMHG | HEART RATE: 67 BPM | TEMPERATURE: 98.6 F | SYSTOLIC BLOOD PRESSURE: 120 MMHG | RESPIRATION RATE: 16 BRPM

## 2023-03-02 DIAGNOSIS — I50.32 CHRONIC HEART FAILURE WITH PRESERVED EJECTION FRACTION (HCC): Primary | ICD-10-CM

## 2023-03-02 DIAGNOSIS — I50.32 CHRONIC DIASTOLIC CONGESTIVE HEART FAILURE (HCC): ICD-10-CM

## 2023-03-02 PROCEDURE — 96374 THER/PROPH/DIAG INJ IV PUSH: CPT

## 2023-03-02 PROCEDURE — 2580000003 HC RX 258: Performed by: INTERNAL MEDICINE

## 2023-03-02 PROCEDURE — 6360000002 HC RX W HCPCS: Performed by: INTERNAL MEDICINE

## 2023-03-02 PROCEDURE — 99211 OFF/OP EST MAY X REQ PHY/QHP: CPT

## 2023-03-02 PROCEDURE — 96375 TX/PRO/DX INJ NEW DRUG ADDON: CPT

## 2023-03-02 RX ORDER — SODIUM CHLORIDE 0.9 % (FLUSH) 0.9 %
5-40 SYRINGE (ML) INJECTION ONCE
Status: CANCELLED
Start: 2023-03-09 | End: 2023-03-09

## 2023-03-02 RX ORDER — FUROSEMIDE 10 MG/ML
120 INJECTION INTRAMUSCULAR; INTRAVENOUS ONCE
Status: CANCELLED
Start: 2023-03-09 | End: 2023-03-09

## 2023-03-02 RX ORDER — FUROSEMIDE 10 MG/ML
120 INJECTION INTRAMUSCULAR; INTRAVENOUS ONCE
Status: COMPLETED | OUTPATIENT
Start: 2023-03-02 | End: 2023-03-02

## 2023-03-02 RX ORDER — SODIUM CHLORIDE 0.9 % (FLUSH) 0.9 %
5-40 SYRINGE (ML) INJECTION ONCE
Status: COMPLETED | OUTPATIENT
Start: 2023-03-02 | End: 2023-03-02

## 2023-03-02 RX ADMIN — SODIUM CHLORIDE, PRESERVATIVE FREE 10 ML: 5 INJECTION INTRAVENOUS at 14:22

## 2023-03-02 RX ADMIN — FUROSEMIDE 120 MG: 10 INJECTION, SOLUTION INTRAMUSCULAR; INTRAVENOUS at 14:23

## 2023-03-02 NOTE — TELEPHONE ENCOUNTER
Received refill request for spironolactone (ALDACTONE) 50 MG tablet from [facility].      Last OV: 1- NPKV    Next OV: 3- ARETHA    Last Labs: 2- BMP    Last Filled:  12-5-2022 ARETHA

## 2023-03-02 NOTE — PROGRESS NOTES
Pt ambulatory to infusion center for Lasix IVP. IV access obtained. IVP Lasix administered; 120 mg IVP given over 20 minutes. IV removed. Pt/spouse denied need for printed AVS. Pt to return next week for same treatment.

## 2023-03-05 NOTE — TELEPHONE ENCOUNTER
Last seen 3/14/22  Last labs 4/26/22  Lipid, BMP  Next visit 6/14/22    Last filled SSZ  2/24/22 #180  Leflunomide #30  2 R/F ambulatory

## 2023-03-06 RX ORDER — SPIRONOLACTONE 50 MG/1
TABLET, FILM COATED ORAL
Qty: 270 TABLET | Refills: 3 | Status: ON HOLD | OUTPATIENT
Start: 2023-03-06

## 2023-03-09 ENCOUNTER — HOSPITAL ENCOUNTER (INPATIENT)
Age: 53
LOS: 4 days | Discharge: HOME HEALTH | DRG: 045 | End: 2023-03-13
Attending: EMERGENCY MEDICINE | Admitting: INTERNAL MEDICINE
Payer: COMMERCIAL

## 2023-03-09 ENCOUNTER — APPOINTMENT (OUTPATIENT)
Dept: GENERAL RADIOLOGY | Age: 53
DRG: 045 | End: 2023-03-09
Payer: COMMERCIAL

## 2023-03-09 ENCOUNTER — HOSPITAL ENCOUNTER (OUTPATIENT)
Dept: ONCOLOGY | Age: 53
Setting detail: INFUSION SERIES
Discharge: HOME | DRG: 045 | End: 2023-03-09
Payer: COMMERCIAL

## 2023-03-09 ENCOUNTER — APPOINTMENT (OUTPATIENT)
Dept: MRI IMAGING | Age: 53
DRG: 045 | End: 2023-03-09
Payer: COMMERCIAL

## 2023-03-09 ENCOUNTER — APPOINTMENT (OUTPATIENT)
Dept: CT IMAGING | Age: 53
DRG: 045 | End: 2023-03-09
Payer: COMMERCIAL

## 2023-03-09 DIAGNOSIS — E87.1 HYPONATREMIA: ICD-10-CM

## 2023-03-09 DIAGNOSIS — N18.9 ACUTE RENAL FAILURE SUPERIMPOSED ON CHRONIC KIDNEY DISEASE, UNSPECIFIED CKD STAGE, UNSPECIFIED ACUTE RENAL FAILURE TYPE (HCC): ICD-10-CM

## 2023-03-09 DIAGNOSIS — I50.32 CHRONIC DIASTOLIC CONGESTIVE HEART FAILURE (HCC): ICD-10-CM

## 2023-03-09 DIAGNOSIS — S09.90XA CLOSED HEAD INJURY, INITIAL ENCOUNTER: ICD-10-CM

## 2023-03-09 DIAGNOSIS — E11.65 POORLY CONTROLLED TYPE 2 DIABETES MELLITUS (HCC): ICD-10-CM

## 2023-03-09 DIAGNOSIS — R53.1 LEFT-SIDED WEAKNESS: ICD-10-CM

## 2023-03-09 DIAGNOSIS — R47.81 SLURRED SPEECH: ICD-10-CM

## 2023-03-09 DIAGNOSIS — E66.01 MORBIDLY OBESE (HCC): ICD-10-CM

## 2023-03-09 DIAGNOSIS — I50.32 CHRONIC HEART FAILURE WITH PRESERVED EJECTION FRACTION (HCC): Primary | ICD-10-CM

## 2023-03-09 DIAGNOSIS — I63.9 ACUTE CVA (CEREBROVASCULAR ACCIDENT) (HCC): Primary | ICD-10-CM

## 2023-03-09 DIAGNOSIS — N17.9 ACUTE RENAL FAILURE SUPERIMPOSED ON CHRONIC KIDNEY DISEASE, UNSPECIFIED CKD STAGE, UNSPECIFIED ACUTE RENAL FAILURE TYPE (HCC): ICD-10-CM

## 2023-03-09 DIAGNOSIS — R77.8 ELEVATED TROPONIN: ICD-10-CM

## 2023-03-09 LAB
A/G RATIO: 0.9 (ref 1.1–2.2)
ALBUMIN SERPL-MCNC: 3.8 G/DL (ref 3.4–5)
ALP BLD-CCNC: 106 U/L (ref 40–129)
ALT SERPL-CCNC: 22 U/L (ref 10–40)
ANION GAP SERPL CALCULATED.3IONS-SCNC: 12 MMOL/L (ref 3–16)
APTT: 30.5 SEC (ref 23–34.3)
AST SERPL-CCNC: 84 U/L (ref 15–37)
BASOPHILS ABSOLUTE: 0.1 K/UL (ref 0–0.2)
BASOPHILS RELATIVE PERCENT: 1.2 %
BILIRUB SERPL-MCNC: <0.2 MG/DL (ref 0–1)
BILIRUBIN URINE: NEGATIVE
BLOOD, URINE: NEGATIVE
BUN BLDV-MCNC: 68 MG/DL (ref 7–20)
CALCIUM SERPL-MCNC: 9.1 MG/DL (ref 8.3–10.6)
CHLORIDE BLD-SCNC: 84 MMOL/L (ref 99–110)
CLARITY: CLEAR
CO2: 32 MMOL/L (ref 21–32)
COLOR: YELLOW
CREAT SERPL-MCNC: 2.3 MG/DL (ref 0.6–1.1)
EOSINOPHILS ABSOLUTE: 0.2 K/UL (ref 0–0.6)
EOSINOPHILS RELATIVE PERCENT: 1.5 %
GFR SERPL CREATININE-BSD FRML MDRD: 25 ML/MIN/{1.73_M2}
GLUCOSE BLD-MCNC: 115 MG/DL (ref 70–99)
GLUCOSE BLD-MCNC: 191 MG/DL (ref 70–99)
GLUCOSE BLD-MCNC: 192 MG/DL (ref 70–99)
GLUCOSE URINE: 500 MG/DL
HCT VFR BLD CALC: 35.1 % (ref 36–48)
HEMOGLOBIN: 11.6 G/DL (ref 12–16)
INR BLD: 0.94 (ref 0.87–1.14)
KETONES, URINE: NEGATIVE MG/DL
LACTIC ACID, SEPSIS: 1.6 MMOL/L (ref 0.4–1.9)
LEUKOCYTE ESTERASE, URINE: NEGATIVE
LYMPHOCYTES ABSOLUTE: 2.4 K/UL (ref 1–5.1)
LYMPHOCYTES RELATIVE PERCENT: 22.6 %
MAGNESIUM: 2.3 MG/DL (ref 1.8–2.4)
MCH RBC QN AUTO: 28.7 PG (ref 26–34)
MCHC RBC AUTO-ENTMCNC: 33.2 G/DL (ref 31–36)
MCV RBC AUTO: 86.4 FL (ref 80–100)
MICROSCOPIC EXAMINATION: ABNORMAL
MONOCYTES ABSOLUTE: 0.9 K/UL (ref 0–1.3)
MONOCYTES RELATIVE PERCENT: 8.8 %
NEUTROPHILS ABSOLUTE: 7.1 K/UL (ref 1.7–7.7)
NEUTROPHILS RELATIVE PERCENT: 65.9 %
NITRITE, URINE: NEGATIVE
PDW BLD-RTO: 13.4 % (ref 12.4–15.4)
PERFORMED ON: ABNORMAL
PERFORMED ON: ABNORMAL
PH UA: 7 (ref 5–8)
PLATELET # BLD: 294 K/UL (ref 135–450)
PMV BLD AUTO: 9.3 FL (ref 5–10.5)
POTASSIUM SERPL-SCNC: 4.6 MMOL/L (ref 3.5–5.1)
PRO-BNP: 129 PG/ML (ref 0–124)
PROTEIN UA: NEGATIVE MG/DL
PROTHROMBIN TIME: 12.5 SEC (ref 11.7–14.5)
RBC # BLD: 4.06 M/UL (ref 4–5.2)
SODIUM BLD-SCNC: 128 MMOL/L (ref 136–145)
SPECIFIC GRAVITY UA: 1.01 (ref 1–1.03)
TOTAL PROTEIN: 8 G/DL (ref 6.4–8.2)
TROPONIN: 0.02 NG/ML
TROPONIN: 0.03 NG/ML
URINE REFLEX TO CULTURE: ABNORMAL
URINE TYPE: ABNORMAL
UROBILINOGEN, URINE: 0.2 E.U./DL
WBC # BLD: 10.7 K/UL (ref 4–11)

## 2023-03-09 PROCEDURE — 99211 OFF/OP EST MAY X REQ PHY/QHP: CPT

## 2023-03-09 PROCEDURE — 70547 MR ANGIOGRAPHY NECK W/O DYE: CPT

## 2023-03-09 PROCEDURE — 81003 URINALYSIS AUTO W/O SCOPE: CPT

## 2023-03-09 PROCEDURE — 70544 MR ANGIOGRAPHY HEAD W/O DYE: CPT

## 2023-03-09 PROCEDURE — 2580000003 HC RX 258: Performed by: INTERNAL MEDICINE

## 2023-03-09 PROCEDURE — 70450 CT HEAD/BRAIN W/O DYE: CPT

## 2023-03-09 PROCEDURE — 70551 MRI BRAIN STEM W/O DYE: CPT

## 2023-03-09 PROCEDURE — 83880 ASSAY OF NATRIURETIC PEPTIDE: CPT

## 2023-03-09 PROCEDURE — 6360000002 HC RX W HCPCS: Performed by: INTERNAL MEDICINE

## 2023-03-09 PROCEDURE — 2060000000 HC ICU INTERMEDIATE R&B

## 2023-03-09 PROCEDURE — 84484 ASSAY OF TROPONIN QUANT: CPT

## 2023-03-09 PROCEDURE — 85610 PROTHROMBIN TIME: CPT

## 2023-03-09 PROCEDURE — 93005 ELECTROCARDIOGRAM TRACING: CPT | Performed by: PHYSICIAN ASSISTANT

## 2023-03-09 PROCEDURE — 83605 ASSAY OF LACTIC ACID: CPT

## 2023-03-09 PROCEDURE — 6370000000 HC RX 637 (ALT 250 FOR IP): Performed by: PHYSICIAN ASSISTANT

## 2023-03-09 PROCEDURE — 85730 THROMBOPLASTIN TIME PARTIAL: CPT

## 2023-03-09 PROCEDURE — 85025 COMPLETE CBC W/AUTO DIFF WBC: CPT

## 2023-03-09 PROCEDURE — 80053 COMPREHEN METABOLIC PANEL: CPT

## 2023-03-09 PROCEDURE — 6370000000 HC RX 637 (ALT 250 FOR IP): Performed by: INTERNAL MEDICINE

## 2023-03-09 PROCEDURE — 83735 ASSAY OF MAGNESIUM: CPT

## 2023-03-09 PROCEDURE — 71045 X-RAY EXAM CHEST 1 VIEW: CPT

## 2023-03-09 PROCEDURE — 99285 EMERGENCY DEPT VISIT HI MDM: CPT

## 2023-03-09 PROCEDURE — 2580000003 HC RX 258: Performed by: PHYSICIAN ASSISTANT

## 2023-03-09 PROCEDURE — 36415 COLL VENOUS BLD VENIPUNCTURE: CPT

## 2023-03-09 RX ORDER — ONDANSETRON 4 MG/1
4 TABLET, ORALLY DISINTEGRATING ORAL EVERY 8 HOURS PRN
Status: DISCONTINUED | OUTPATIENT
Start: 2023-03-09 | End: 2023-03-13 | Stop reason: HOSPADM

## 2023-03-09 RX ORDER — INSULIN LISPRO 100 [IU]/ML
0-16 INJECTION, SOLUTION INTRAVENOUS; SUBCUTANEOUS
Status: DISCONTINUED | OUTPATIENT
Start: 2023-03-10 | End: 2023-03-13 | Stop reason: HOSPADM

## 2023-03-09 RX ORDER — FUROSEMIDE 10 MG/ML
120 INJECTION INTRAMUSCULAR; INTRAVENOUS ONCE
Status: DISCONTINUED | OUTPATIENT
Start: 2023-03-09 | End: 2023-03-10 | Stop reason: HOSPADM

## 2023-03-09 RX ORDER — ASPIRIN 81 MG/1
81 TABLET ORAL DAILY
Status: DISCONTINUED | OUTPATIENT
Start: 2023-03-10 | End: 2023-03-13 | Stop reason: HOSPADM

## 2023-03-09 RX ORDER — LEFLUNOMIDE 20 MG/1
20 TABLET ORAL DAILY
Status: DISCONTINUED | OUTPATIENT
Start: 2023-03-10 | End: 2023-03-13 | Stop reason: HOSPADM

## 2023-03-09 RX ORDER — FEBUXOSTAT 80 MG/1
1 TABLET, FILM COATED ORAL DAILY
Status: DISCONTINUED | OUTPATIENT
Start: 2023-03-10 | End: 2023-03-09

## 2023-03-09 RX ORDER — RANOLAZINE 500 MG/1
500 TABLET, EXTENDED RELEASE ORAL 2 TIMES DAILY
Status: DISCONTINUED | OUTPATIENT
Start: 2023-03-09 | End: 2023-03-13 | Stop reason: HOSPADM

## 2023-03-09 RX ORDER — DIAZEPAM 5 MG/1
5 TABLET ORAL 2 TIMES DAILY PRN
Status: DISCONTINUED | OUTPATIENT
Start: 2023-03-09 | End: 2023-03-13 | Stop reason: HOSPADM

## 2023-03-09 RX ORDER — ENOXAPARIN SODIUM 100 MG/ML
30 INJECTION SUBCUTANEOUS DAILY
Status: CANCELLED | OUTPATIENT
Start: 2023-03-09

## 2023-03-09 RX ORDER — ASPIRIN 81 MG/1
324 TABLET, CHEWABLE ORAL ONCE
Status: COMPLETED | OUTPATIENT
Start: 2023-03-09 | End: 2023-03-09

## 2023-03-09 RX ORDER — POLYETHYLENE GLYCOL 3350 17 G/17G
17 POWDER, FOR SOLUTION ORAL DAILY PRN
Status: DISCONTINUED | OUTPATIENT
Start: 2023-03-09 | End: 2023-03-13 | Stop reason: HOSPADM

## 2023-03-09 RX ORDER — ALBUTEROL SULFATE 2.5 MG/3ML
2.5 SOLUTION RESPIRATORY (INHALATION) EVERY 6 HOURS PRN
Status: DISCONTINUED | OUTPATIENT
Start: 2023-03-09 | End: 2023-03-09

## 2023-03-09 RX ORDER — MECLIZINE HCL 12.5 MG/1
12.5 TABLET ORAL 3 TIMES DAILY PRN
Status: DISCONTINUED | OUTPATIENT
Start: 2023-03-09 | End: 2023-03-13 | Stop reason: HOSPADM

## 2023-03-09 RX ORDER — ALBUTEROL SULFATE 2.5 MG/3ML
2.5 SOLUTION RESPIRATORY (INHALATION) EVERY 4 HOURS PRN
Status: DISCONTINUED | OUTPATIENT
Start: 2023-03-09 | End: 2023-03-13 | Stop reason: HOSPADM

## 2023-03-09 RX ORDER — DULOXETIN HYDROCHLORIDE 60 MG/1
60 CAPSULE, DELAYED RELEASE ORAL 2 TIMES DAILY
Status: DISCONTINUED | OUTPATIENT
Start: 2023-03-09 | End: 2023-03-13 | Stop reason: HOSPADM

## 2023-03-09 RX ORDER — SODIUM CHLORIDE 0.9 % (FLUSH) 0.9 %
5-40 SYRINGE (ML) INJECTION ONCE
Status: DISCONTINUED | OUTPATIENT
Start: 2023-03-09 | End: 2023-03-10 | Stop reason: HOSPADM

## 2023-03-09 RX ORDER — FUROSEMIDE 10 MG/ML
120 INJECTION INTRAMUSCULAR; INTRAVENOUS ONCE
Start: 2023-03-16 | End: 2023-03-16

## 2023-03-09 RX ORDER — MONTELUKAST SODIUM 10 MG/1
10 TABLET ORAL NIGHTLY
Status: DISCONTINUED | OUTPATIENT
Start: 2023-03-09 | End: 2023-03-13 | Stop reason: HOSPADM

## 2023-03-09 RX ORDER — NITROGLYCERIN 0.4 MG/1
0.4 TABLET SUBLINGUAL EVERY 5 MIN PRN
Status: DISCONTINUED | OUTPATIENT
Start: 2023-03-09 | End: 2023-03-13 | Stop reason: HOSPADM

## 2023-03-09 RX ORDER — ATORVASTATIN CALCIUM 80 MG/1
80 TABLET, FILM COATED ORAL NIGHTLY
Status: DISCONTINUED | OUTPATIENT
Start: 2023-03-09 | End: 2023-03-13 | Stop reason: HOSPADM

## 2023-03-09 RX ORDER — ALBUTEROL SULFATE 2.5 MG/3ML
2.5 SOLUTION RESPIRATORY (INHALATION) 2 TIMES DAILY
Status: DISCONTINUED | OUTPATIENT
Start: 2023-03-10 | End: 2023-03-13 | Stop reason: HOSPADM

## 2023-03-09 RX ORDER — HEPARIN SODIUM 5000 [USP'U]/ML
5000 INJECTION, SOLUTION INTRAVENOUS; SUBCUTANEOUS 2 TIMES DAILY
Status: DISCONTINUED | OUTPATIENT
Start: 2023-03-09 | End: 2023-03-13 | Stop reason: HOSPADM

## 2023-03-09 RX ORDER — BUTALBITAL, ACETAMINOPHEN AND CAFFEINE 50; 325; 40 MG/1; MG/1; MG/1
1 TABLET ORAL EVERY 6 HOURS PRN
Status: DISCONTINUED | OUTPATIENT
Start: 2023-03-09 | End: 2023-03-13 | Stop reason: HOSPADM

## 2023-03-09 RX ORDER — SODIUM CHLORIDE 0.9 % (FLUSH) 0.9 %
5-40 SYRINGE (ML) INJECTION ONCE
Start: 2023-03-16 | End: 2023-03-16

## 2023-03-09 RX ORDER — LEVOTHYROXINE SODIUM 0.15 MG/1
150 TABLET ORAL DAILY
Status: DISCONTINUED | OUTPATIENT
Start: 2023-03-10 | End: 2023-03-13 | Stop reason: HOSPADM

## 2023-03-09 RX ORDER — LABETALOL HYDROCHLORIDE 5 MG/ML
10 INJECTION, SOLUTION INTRAVENOUS EVERY 10 MIN PRN
Status: DISCONTINUED | OUTPATIENT
Start: 2023-03-09 | End: 2023-03-13 | Stop reason: HOSPADM

## 2023-03-09 RX ORDER — TRAZODONE HYDROCHLORIDE 50 MG/1
50 TABLET ORAL NIGHTLY
Status: DISCONTINUED | OUTPATIENT
Start: 2023-03-09 | End: 2023-03-13 | Stop reason: HOSPADM

## 2023-03-09 RX ORDER — OXYCODONE HCL 10 MG/1
10 TABLET, FILM COATED, EXTENDED RELEASE ORAL EVERY 12 HOURS SCHEDULED
Status: DISCONTINUED | OUTPATIENT
Start: 2023-03-09 | End: 2023-03-13 | Stop reason: HOSPADM

## 2023-03-09 RX ORDER — ACETAMINOPHEN 650 MG/1
650 SUPPOSITORY RECTAL EVERY 4 HOURS PRN
Status: DISCONTINUED | OUTPATIENT
Start: 2023-03-09 | End: 2023-03-13 | Stop reason: HOSPADM

## 2023-03-09 RX ORDER — SODIUM CHLORIDE 9 MG/ML
INJECTION, SOLUTION INTRAVENOUS CONTINUOUS
Status: DISCONTINUED | OUTPATIENT
Start: 2023-03-09 | End: 2023-03-10

## 2023-03-09 RX ORDER — BENZTROPINE MESYLATE 1 MG/1
1 TABLET ORAL NIGHTLY
Status: DISCONTINUED | OUTPATIENT
Start: 2023-03-09 | End: 2023-03-13 | Stop reason: HOSPADM

## 2023-03-09 RX ORDER — DEXTROSE MONOHYDRATE 100 MG/ML
INJECTION, SOLUTION INTRAVENOUS CONTINUOUS PRN
Status: DISCONTINUED | OUTPATIENT
Start: 2023-03-09 | End: 2023-03-13 | Stop reason: HOSPADM

## 2023-03-09 RX ORDER — ACETAMINOPHEN 325 MG/1
650 TABLET ORAL EVERY 4 HOURS PRN
Status: DISCONTINUED | OUTPATIENT
Start: 2023-03-09 | End: 2023-03-13 | Stop reason: HOSPADM

## 2023-03-09 RX ORDER — 0.9 % SODIUM CHLORIDE 0.9 %
500 INTRAVENOUS SOLUTION INTRAVENOUS ONCE
Status: COMPLETED | OUTPATIENT
Start: 2023-03-09 | End: 2023-03-09

## 2023-03-09 RX ORDER — INSULIN LISPRO 100 [IU]/ML
0-4 INJECTION, SOLUTION INTRAVENOUS; SUBCUTANEOUS NIGHTLY
Status: DISCONTINUED | OUTPATIENT
Start: 2023-03-09 | End: 2023-03-13 | Stop reason: HOSPADM

## 2023-03-09 RX ORDER — OXYCODONE HYDROCHLORIDE 5 MG/1
5 TABLET ORAL EVERY 6 HOURS PRN
Status: DISCONTINUED | OUTPATIENT
Start: 2023-03-09 | End: 2023-03-13 | Stop reason: HOSPADM

## 2023-03-09 RX ORDER — ONDANSETRON 2 MG/ML
4 INJECTION INTRAMUSCULAR; INTRAVENOUS EVERY 6 HOURS PRN
Status: DISCONTINUED | OUTPATIENT
Start: 2023-03-09 | End: 2023-03-13 | Stop reason: HOSPADM

## 2023-03-09 RX ORDER — CLOPIDOGREL BISULFATE 75 MG/1
75 TABLET ORAL DAILY
Status: DISCONTINUED | OUTPATIENT
Start: 2023-03-10 | End: 2023-03-13 | Stop reason: HOSPADM

## 2023-03-09 RX ADMIN — ASPIRIN 324 MG: 81 TABLET, CHEWABLE ORAL at 17:49

## 2023-03-09 RX ADMIN — ATORVASTATIN CALCIUM 80 MG: 80 TABLET, FILM COATED ORAL at 23:04

## 2023-03-09 RX ADMIN — BENZTROPINE MESYLATE 1 MG: 1 TABLET ORAL at 23:00

## 2023-03-09 RX ADMIN — DULOXETINE HYDROCHLORIDE 60 MG: 60 CAPSULE, DELAYED RELEASE ORAL at 23:01

## 2023-03-09 RX ADMIN — RANOLAZINE 500 MG: 500 TABLET, EXTENDED RELEASE ORAL at 23:01

## 2023-03-09 RX ADMIN — MONTELUKAST SODIUM 10 MG: 10 TABLET, FILM COATED ORAL at 23:02

## 2023-03-09 RX ADMIN — SODIUM CHLORIDE 500 ML: 9 INJECTION, SOLUTION INTRAVENOUS at 17:57

## 2023-03-09 RX ADMIN — HEPARIN SODIUM 5000 UNITS: 5000 INJECTION INTRAVENOUS; SUBCUTANEOUS at 23:05

## 2023-03-09 RX ADMIN — TRAZODONE HYDROCHLORIDE 50 MG: 50 TABLET ORAL at 23:04

## 2023-03-09 RX ADMIN — SODIUM CHLORIDE: 9 INJECTION, SOLUTION INTRAVENOUS at 23:14

## 2023-03-09 RX ADMIN — DIAZEPAM 5 MG: 5 TABLET ORAL at 23:03

## 2023-03-09 RX ADMIN — BUTALBITAL, ACETAMINOPHEN, AND CAFFEINE 1 TABLET: 50; 325; 40 TABLET ORAL at 23:03

## 2023-03-09 ASSESSMENT — PAIN SCALES - GENERAL
PAINLEVEL_OUTOF10: 10
PAINLEVEL_OUTOF10: 7

## 2023-03-09 ASSESSMENT — PAIN DESCRIPTION - DESCRIPTORS: DESCRIPTORS: THROBBING

## 2023-03-09 ASSESSMENT — ENCOUNTER SYMPTOMS
WHEEZING: 0
EYE ITCHING: 0
STRIDOR: 0
SHORTNESS OF BREATH: 0
EYE DISCHARGE: 0
COUGH: 0
EYE PAIN: 0
BACK PAIN: 0
NAUSEA: 0
ABDOMINAL PAIN: 0
VOMITING: 0
EYE REDNESS: 0
CONSTIPATION: 0
COLOR CHANGE: 0
PHOTOPHOBIA: 0
DIARRHEA: 0

## 2023-03-09 ASSESSMENT — LIFESTYLE VARIABLES: HOW OFTEN DO YOU HAVE A DRINK CONTAINING ALCOHOL: NEVER

## 2023-03-09 ASSESSMENT — PAIN - FUNCTIONAL ASSESSMENT: PAIN_FUNCTIONAL_ASSESSMENT: 0-10

## 2023-03-09 ASSESSMENT — PAIN DESCRIPTION - LOCATION
LOCATION: HEAD
LOCATION: LEG;HAND

## 2023-03-09 ASSESSMENT — PAIN DESCRIPTION - ORIENTATION
ORIENTATION: ANTERIOR;POSTERIOR
ORIENTATION: RIGHT;LEFT

## 2023-03-09 NOTE — ED PROVIDER NOTES
I independently examined and evaluated [facility]. In brief, patient is a 50-year-old female presents to the emergency department for evaluation after fall. Patient reports having a fall yesterday around 8 PM.  Since then, she has been having worse than usual bilateral lower extremity weakness. Says she has also been having dizziness. Focused exam revealed no pronator drift. There was bilateral lower extremity weakness with drift of bilateral lower extremity but did not hit the bed. As a last known normal within 24 hours, stroke alert was called and patient discussed with  stroke team.  She was found to have a mild MARIA D with creatinine of 2.3, up from baseline of 1.8. She was given 500 cc IV fluid bolus. Troponin is 0.03. Will obtain a 3-hour troponin. CT shows small suspected lacunar infarct within the right paramedian miri. Unable to obtain CTA due to patient's IV contrast allergy.  stroke team recommends obtaining MRI routine. Given aspirin. Patient admitted for further evaluation and treatment. The Ekg interpreted by me shows  Normal sinus rhythm with a rate of 67  Axis is normal  QTc is normal  Intervals and Durations are unremarkable. ST Segments: Nonspecific changes  No significant change compared to EKG from October 28, 2020    All diagnostic, treatment, and disposition decisions were made by myself in conjunction with the advanced practice provider/resident physician. I personally saw the patient and performed a substantive portion of the visit including aspects of the medical decision making. Comment: Please note this report has been produced using speech recognition software and may contain errors related to that system including errors in grammar, punctuation, and spelling, as well as words and phrases that may be inappropriate. If there are any questions or concerns please feel free to contact the dictating provider for clarification.     For all further details of the patient's emergency department visit, please see the advanced practice provider's documentation.        Ganga De Los Santos MD  03/09/23 2624

## 2023-03-09 NOTE — ED NOTES
Called the  @ [phone] @ [phone] for a stroke alert for the pt per Kary Sood. Called the stroke team @ [phone] for the pt per ALBERTO Marie. Stroke team called back @ [phone] and spoke with Merna Souza about the pt's case. Repaged Stroke Team @ [phone] per Joselito Marie for an update about the pt's case. Stroke team called back @ [phone] and spoke with ALBERTO Marie about the pt.       [employer]  03/09/23 1532

## 2023-03-09 NOTE — H&P
[facility] Medicine History & Physical      PCP: [address],     Date of Admission: 3/9/2023    Chief Complaint:  Fall, Left arm and left leg numbness     History Of Present Illness:      47 y.o. female who presents to the emergency department stating that she has been feeling intermittently dizzy and shaky for several days. Yesterday evening, she went to the kitchen to make her cell something to eat and forgot to wear her oxygen.  states that sometimes when she forgets to wear her oxygen, she gets increasingly lightheaded and shaky. She pressed on the bottom of the trash can to open the lid and became dizzy and fell over and struck her head on the ground. There was no loss of consciousness. Patient yelled out for her  in the next room. He helped her up and states that since then she has been feeling extremely dizzy, weak, shaky. Her left arm and leg are weaker than her right since she hit her head. In review of her chart, she does have history of TIA with left-sided weakness. She wears 3 L of oxygen at all time. She has history of breast cancer in remission. She has chronic lymphedema in the left upper extremity and right lower extremity. She is able to speak some [place] but  does help with translation and secondary to her Spiritism, she prefers not to use an .      Past Medical History:          Diagnosis Date    Anxiety     Anxiety and depression     Arthritis     not sure of specific type    Asthma     CAD (coronary artery disease)     Cancer ([address] 2007    left breast    Cerebral artery occlusion with cerebral infarction ([address]     2000, 2001    CHF (congestive heart failure) ([address] 2018    Chronic kidney disease     renal insufficency/to see Dr Adri Delgado    Chronic pain     Constipation     COPD (chronic obstructive pulmonary disease) ([address]     Depression     Diabetes mellitus ([address]     Diabetic polyneuropathy associated with type 2 diabetes mellitus [facility] 2/2/2018    Dysthymia 2/2/2018    ESBL (extended spectrum beta-lactamase) producing bacteria infection 03/14/2018    urine    Fibromyalgia     Gastric ulcer, unspecified as acute or chronic, without mention of hemorrhage, perforation, or obstruction     GERD (gastroesophageal reflux disease)     Gout     HIGH CHOLESTEROL     Hypertension     Hypothyroidism     Pneumonia 3/25/2020    Pyelonephritis     Severe persistent asthma without complication 4/5/4314    Thyroid disease     TIA (transient ischemic attack)     with occasional left leg and hand weakness       Past Surgical History:          Procedure Laterality Date    BREAST SURGERY      left mastectomy    CARPAL TUNNEL RELEASE      Bilateral    FINGER CONTRACTURE SURGERY      HYSTERECTOMY (CERVIX STATUS UNKNOWN)  2005    USO    MASTECTOMY      TONSILLECTOMY      UPPER GASTROINTESTINAL ENDOSCOPY  2019    stretched esophagous        Medications Prior to Admission:      Prior to Admission medications    Medication Sig Start Date End Date Taking?  Authorizing Provider   spironolactone (ALDACTONE) 50 MG tablet TAKE 2 TABLETS BY MOUTH IN THE MORNING and 1 tablet in the evening 3/6/23   Darlene Arndt MD   levothyroxine (SYNTHROID) 150 MCG tablet TAKE 1 TABLET BY MOUTH IN THE MORNING before breakfast 2/23/23   Gay Escamilla MD   torsemide (DEMADEX) 100 MG tablet TAKE 1 TABLET BY MOUTH IN THE MORNING AND 1/2 TABLET IN THE EVENING. 2/21/23   HERMES Cardenas CNP   leflunomide (ARAVA) 20 MG tablet TAKE 1 TABLET BY MOUTH EVERY DAY 2/16/23   Joseph Lyles MD   simvastatin (ZOCOR) 20 MG tablet TAKE 1 TABLET BY MOUTH one time daily at night 2/2/23   HERMES Cardenas CNP   vitamin D3 (CHOLECALCIFEROL) 25 MCG (1000 UT) TABS tablet TAKE 3 TABLETS BY MOUTH ONE TIME A DAY 2/2/23   Gay Escamilla MD   lubiprostone (AMITIZA) 24 MCG capsule TAKE 1 CAPSULE BY MOUTH TWO TIMES A DAY WITH MEALS 2/2/23   Anastacio Ayers DO   ferrous sulfate (FEROSUL) 325 (65 Fe) MG tablet TAKE 1 TABLET BY MOUTH TWO TIMES A DAY WITH MEALS 1/19/23   Nishant Katz, DO   mineral oil-hydrophilic petrolatum (HYDROPHOR) ointment Apply topically 4 times daily as needed to hands and feet. 1/5/23   Nishant Katz, DO   Compression Sleeves Left arm 1/4/23   Nishant Katz, DO   Elastic Bandages & Supports (COMPRESSION & SUPPORT GLOVES L) MISC 1 each by Does not apply route daily Left hand 1/4/23   Nishant Katz, DO   Elastic Bandages & Supports (MEDICAL COMPRESSION STOCKINGS) [address] 2 each by Does not apply route daily On am/off HS. 1/4/23   Nishant Katz, DO   Urea 40 % LOTN Apply to feet nightly and cover with Aquaphor 1/4/23   Nishant Katz DO   sulfaSALAzine (AZULFIDINE) 500 MG tablet TAKE 1 TABLET BY MOUTH TWO TIMES A DAY 12/15/22   Lucy Florian MD   omeprazole (PRILOSEC) 20 MG delayed release capsule TAKE 1 CAPSULE BY MOUTH TWO TIMES A DAY 10/26/22   Nishant Katz DO   nitroGLYCERIN (NITROSTAT) 0.4 MG SL tablet PLACE ONE TABLET UNDER TONGUE AS NEEDED FOR CHEST PAIN, MAY REPEAT EVERY 5 MINUTES AS NEEDED UP TO A MAX OF 3 TABLETS.   IF NO RELIEF AFTER 1 DOSE, CALL 911. 10/3/22   Елена Brooks MD   metoprolol tartrate (LOPRESSOR) 25 MG tablet TAKE 1 TABLET BY MOUTH TWO TIMES A DAY 9/28/22   Елена Brooks MD   Febuxostat 80 MG TABS TAKE 1 TABLET BY MOUTH EVERY DAY 9/14/22   Lucy Florian MD   pregabalin (LYRICA) 75 MG capsule TAKE 1 CAPSULE BY MOUTH TWO TIMES A DAY 9/14/22 3/1/23  Lucy Florian MD   SM SENNA-S 8.6-50 MG per tablet TAKE 2 TABLETS BY MOUTH TWO TIMES A DAY 9/6/22   Nishant Katz DO   solifenacin (VESICARE) 10 MG tablet Take 1 tablet by mouth daily 8/23/22 8/23/23  Nishant Katz DO   meclizine (ANTIVERT) 25 MG tablet Take 1 tablet by mouth 3 times daily as needed for Dizziness 8/23/22   Nishant Katz DO   montelukast (SINGULAIR) 10 MG tablet Take 1 tablet by mouth nightly 8/22/22   MD GEORGE Johnson FLEXTOUCH 200 UNIT/ML SOPN INJECT 200 UNITS SUBCUTANEOUSLY ONE TIME DAILY 8/12/22   Danii Breen MD   Continuous Blood Gluc Sensor (FREESTYLE EMERALD 2 SENSOR) MISC Change every 14 days 7/27/22   Danii Breen MD   insulin aspart (NOVOLOG FLEXPEN) 100 UNIT/ML injection pen inject 30 to 50 units into the skin three time daily before meals 7/27/22   Danii Breen MD   Insulin Pen Needle (B-D UF III MINI PEN NEEDLES) 31G X 5 MM MISC use five times daily with insulin 7/27/22   Danii Breen MD   STEGLATRO 5 MG TABS TAKE 1 TABLET BY MOUTH EVERY DAY 7/13/22   Danii Breen MD   Blood Glucose Monitoring Suppl (Yariel Krill) w/Device KIT Use to check glucose 4 times daily. 7/12/22   Danii Breen MD   Lidocaine 5 % CREA Apply to feet QID prn pain for peripheral neuropathy 6/21/22   Zeynep Brown DO   butalbital[facility]acetaminophen-caffeine [facility]) -43 MG per tablet Take 1 tablet by mouth every 6 hours as needed for Headaches 6/21/22   Zeynep Brown DO   ranolazine (RANEXA) 500 MG extended release tablet TAKE 1 TABLET BY MOUTH TWO TIMES A DAY 6/6/22   Brandyn Mendoza MD   Compression Stockings MISC by Does not apply route Zippered stockings for daily use on lower extremities (do not wear at night). 20-30mmHg. 4/20/22   Brandyn Mendoza MD   [facility] VERIO strip use to test blood sugar 5 times daily 4/14/22   Danii Breen MD   metOLazone (ZAROXOLYN) 2.5 MG tablet TAKE 1 TABLET BY MOUTH TWO TIMES A WEEK AS NEEDED FOR WEIGHT OVER 180 POUNDS. DO NOT TAKE 2 DAYS IN A ROW.   Patient taking differently: Take 2.5 mg by mouth twice a week 3/18/22   Shana Young APRN - CNS   Continuous Blood Gluc  (FREESTYLE EMERALD 2 READER) MINDY To check glucose levels 3/15/22   Danii Breen MD   albuterol (PROVENTIL) (2.5 MG/3ML) 0.083% nebulizer solution Take 3 mLs by nebulization every 6 hours as needed for Wheezing or Shortness of Breath 1/26/22   Zeynep Brown,    polyethylene glycol [facility]) 17 GM/SCOOP powder TAKE 17 GRAMS (1 CAPFUL) BY MOUTH NIGHTLY 8/12/21   Soraida SABILLON MD TRAY LermaZESS 290 MCG CAPS capsule TAKE 1 CAPSULE BY MOUTH ONE TIME A DAY FOR 90 DAYS 6/25/21   Historical Provider, MD   REXULTI 2 MG TABS tablet Take 2 mg by mouth at bedtime  7/21/21   Historical Provider, MD   methocarbamol (ROBAXIN-750) 750 MG tablet Take 1 tablet by mouth 3 times daily as needed (pain and spasm) 7/26/21   Gerard Pump, MD   nystatin (MYCOSTATIN) 888363 UNIT/ML suspension Take 5 mLs by mouth 4 times daily 11/18/20   Kimberley Hernandez DO   traZODone (DESYREL) 50 MG tablet Take 1 tablet by mouth nightly Take it on the day of sleep study 11/16/20   Niki Barr MD   loratadine (CLARITIN) 10 MG tablet TAKE 1 TABLET BY MOUTH ONE TIME A DAY  10/5/20   Kimberley Hernandez DO   ketoconazole (NIZORAL) 2 % shampoo Apply topically daily as needed. 8/10/20   Kimberley Hernandez DO   diazePAM (VALIUM) 5 MG tablet Take 5 mg by mouth 2 times daily as needed for Anxiety. Historical Provider, MD   OXYGEN Inhale 2 L into the lungs nightly Sometimes with activity    Historical Provider, MD   oxyCODONE (OXYCONTIN) 20 MG extended release tablet Take 20 mg by mouth every 12 hours. Historical Provider, MD   aspirin 81 MG EC tablet Take 81 mg by mouth daily    Historical Provider, MD   benztropine (COGENTIN) 1 MG tablet Take 1 mg by mouth nightly     Historical Provider, MD   oxyCODONE-acetaminophen (PERCOCET)  MG per tablet Take 1 tablet by mouth every 4 hours as needed for Pain . Earliest Fill Date: 6/8/17 6/8/17   Noe Dobbins, APRN - CNP   duloxetine (CYMBALTA) 60 MG capsule Take 60 mg by mouth 2 times daily. Historical Provider, MD       Allergies: Iv dye [iodides], Diazepam, Insulin glargine, and Trazodone and nefazodone    Social History:      TOBACCO:   reports that she has never smoked. She has never used smokeless tobacco.  ETOH:   reports no history of alcohol use.   E-cigarette/Vaping       Questions Responses    E-cigarette/Vaping Use Never User    Start Date     Passive Exposure     Quit Date     Counseling Given Yes    Comments Unknown              Family History:     Reviewed and negative in regards to presenting illness/complaint.        Problem Relation Age of Onset    Asthma Other     Cancer Other     Depression Other     Diabetes Other     Hypertension Other     High Cholesterol Other     Migraines Other     Heart Attack Father 65         of MI    High Blood Pressure Mother     Diabetes Mother        REVIEW OF SYSTEMS COMPLETED:   Pertinent positives as noted in the HPI. All other systems reviewed and negative.    PHYSICAL EXAM PERFORMED:    /61   Pulse 65   Temp 98.2 °F (36.8 °C) (Oral)   Resp 15   Ht 5' (1.524 m)   Wt 202 lb (91.6 kg)   SpO2 100%   BMI 39.45 kg/m²     General appearance:  No apparent distress, appears stated age and cooperative.  HEENT:  Normal cephalic, atraumatic without obvious deformity. Pupils equal, round, and reactive to light.  Extra ocular muscles intact. Conjunctivae/corneas clear.  Neck: Supple, with full range of motion. No jugular venous distention. Trachea midline.  Respiratory:  Normal respiratory effort. Clear to auscultation, bilaterally without Rales/Wheezes/Rhonchi.  Cardiovascular:  Regular rate and rhythm with normal S1/S2 without murmurs, rubs or gallops.  Abdomen: Soft, non-tender, non-distended with normal bowel sounds.  Musculoskeletal:  No clubbing, cyanosis or edema bilaterally.  Full range of motion without deformity.  Skin: Skin color, texture, turgor normal.  No rashes or lesions.  Neurologic:  Neurovascularly intact without any focal sensory/motor deficits. Cranial nerves: II-XII intact, grossly non-focal.  Psychiatric:  Alert and oriented, thought content appropriate, normal insight  Capillary Refill: Brisk,3 seconds, normal  Peripheral Pulses: +2 palpable, equal bilaterally       Labs:     Recent Labs     23  1544   WBC 10.7   HGB 11.6*   HCT 35.1*        Recent Labs     23  1544   *    K 4.6   CL 84*   CO2 32   BUN 68*   CREATININE 2.3*   CALCIUM 9.1     Recent Labs     03/09/23  1544   AST 84*   ALT 22   BILITOT <0.2   ALKPHOS 106     Recent Labs     03/09/23  1544   INR 0.94     Recent Labs     03/09/23  1544   TROPONINI 0.03*       Urinalysis:      Lab Results   Component Value Date/Time    NITRU Negative 03/09/2023 03:44 PM    WBCUA 15 03/13/2019 05:00 PM    BACTERIA RARE 03/15/2018 07:30 PM    RBCUA 2 03/13/2019 05:00 PM    BLOODU Negative 03/09/2023 03:44 PM    SPECGRAV 1.010 03/09/2023 03:44 PM    GLUCOSEU 500 03/09/2023 03:44 PM    GLUCOSEU >=1000 07/08/2010 03:25 PM       Radiology:     CXR: I have reviewed the CXR   EKG:  I have reviewed the EKG     XR CHEST PORTABLE   Final Result   Pulmonary vascular congestion along with increased interstitial opacity,   suggesting interstitial edema.         CT HEAD WO CONTRAST   Final Result   Small suspected lacunar infarct within the right paramedian miri.      No large territory acute cortical infarct, hemorrhage or intracranial mass   effect.      The findings were sent to the Radiology Results Communication Center at 2:56   pm on 3/9/2023 to be communicated to a licensed caregiver, received by   ALBERTO Briceño at 2:59 pm.         MRI BRAIN WO CONTRAST MR WITNESS    (Results Pending)   MRA HEAD WO CONTRAST    (Results Pending)   MRA NECK WO CONTRAST    (Results Pending)       Consults:    IP CONSULT TO HOSPITALIST    ASSESSMENT:    -Acute CVA: Patient presented with dizziness, left arm and left leg weakness.  CT scan of head showed small suspected lacunar infarct within the right paramedian miri.  -Pulm edema  -Chronic hypoxemic respiratory failure: on 2 L home O2.   - Acute kidney injury on CKD stage III: Serum creatinine 2.3 upon arrival.  Baseline creatinine is 1.7.  -Elevated troponin: due to MARIA D and CVA.  - Hyponatremia: Serum sodium 128 upon arrival.  - Anemia chronic stable      PLAN:    Admit patient to telemetry  unit  Neurochecks  Aspirin 325 mg p.o. once. Followed by Aspirin 81 mg po plus Clopidogrel 75 mg po. Obtain MRI brain  MRA head and neck  Obtain TTE  Obtain neurology consultation  Obtain lipid panel, hemoglobin A1c  Respiratory monitoring, pt received one dose of IV furosemide 120 mg. Monitor renal function, avoid nephrotoxic agent, obtain nephrology consultation. diuretic as per nephrologist.  Monitor serum sodium  Continue home medication    DVT Prophylaxis: Heparin subcu  Diet: No diet orders on file  Code Status: Prior    PT/OT Eval Status: yes    Dispo - Pending clinical improvement        Manolo Lynn MD    Thank you Lauri Coffey DO for the opportunity to be involved in this patient's care. If you have any questions or concerns please feel free to contact me at [phone].

## 2023-03-09 NOTE — PROGRESS NOTES
Pt to Dept for Lasix IVP. Pt's  reporting Pt fell today and hit head. Did not report loss of consciousness. Pt  verbalized he brought her to her Appt to have Lasix IVP and then was going to call 911 once they arrived home to be taken to the ER. Pt taken to the ER at this time for Evaluation.  Pt on the schedule 3/16 for treatment,  to update with any changes

## 2023-03-09 NOTE — ED PROVIDER NOTES
[address] [facility]        Pt Name: Dimas Calloway  MRN: [id number]  [place] 1970  Date of evaluation: 3/9/2023  Provider: Judy Tenorio PA-C  PCP: Courtney Andrwes DO  Note Started: 2:50 PM EST 3/9/23       I have seen and evaluated this patient with my supervising physician Genesis Samuel MD.      CHIEF COMPLAINT       Chief Complaint   Patient presents with    Fall     Fall yesterday at 8:00 pm with strike to posterior head, now c/o left arm numbness and left leg weakness.  states fall due to dizziness. HISTORY OF PRESENT ILLNESS: 1 or more Elements     History from : Patient and Family     Limitations to history : Language primary language is Amharic    Dimas Calloway is a 47 y.o. female who presents to the emergency department stating that she has been feeling intermittently dizzy and shaky for several days. Yesterday evening, she went to the kitchen to make her cell something to eat and forgot to wear her oxygen.  states that sometimes when she forgets to wear her oxygen, she gets increasingly lightheaded and shaky. She pressed on the bottom of the trash can to open the lid and became dizzy and fell over and struck her head on the ground. There was no loss of consciousness. Patient yelled out for her  in the next room. He helped her up and states that since then she has been feeling extremely dizzy, weak, shaky. Her left arm and leg are weaker than her right since she hit her head. In review of her chart, she does have history of TIA with left-sided weakness. She wears 3 L of oxygen at all time. She has history of breast cancer in remission. She has chronic lymphedema in the left upper extremity and right lower extremity. She is able to speak some [place] but  does help with translation and secondary to her Faith, she prefers not to use an .      Nursing Notes were all reviewed and agreed with or any disagreements were addressed in the HPI. REVIEW OF SYSTEMS :      Review of Systems   Constitutional:  Negative for chills and fever. HENT: Negative. Eyes:  Negative for photophobia, pain, discharge, redness, itching and visual disturbance. Respiratory:  Negative for cough, shortness of breath, wheezing and stridor. Cardiovascular:  Negative for chest pain, palpitations and leg swelling. Gastrointestinal:  Negative for abdominal pain, constipation, diarrhea, nausea and vomiting. Genitourinary: Negative. Musculoskeletal:  Positive for myalgias. Negative for back pain, neck pain and neck stiffness. Skin:  Negative for color change, pallor, rash and wound. Neurological:  Positive for dizziness, tremors, speech difficulty, weakness and numbness. Negative for seizures, syncope, facial asymmetry, light-headedness and headaches. Psychiatric/Behavioral:  Positive for confusion. All other systems reviewed and are negative. Positives and Pertinent negatives as per HPI. SURGICAL HISTORY     Past Surgical History:   Procedure Laterality Date    BREAST SURGERY      left mastectomy    CARPAL TUNNEL RELEASE      Bilateral    FINGER CONTRACTURE SURGERY      HYSTERECTOMY (CERVIX STATUS UNKNOWN)  2005    USO    MASTECTOMY      TONSILLECTOMY      UPPER GASTROINTESTINAL ENDOSCOPY  2019    stretched esophagous        CURRENTMEDICATIONS       Previous Medications    ALBUTEROL (PROVENTIL) (2.5 MG/3ML) 0.083% NEBULIZER SOLUTION    Take 3 mLs by nebulization every 6 hours as needed for Wheezing or Shortness of Breath    ASPIRIN 81 MG EC TABLET    Take 81 mg by mouth daily    BENZTROPINE (COGENTIN) 1 MG TABLET    Take 1 mg by mouth nightly     BLOOD GLUCOSE MONITORING SUPPL (ONETOUCH VERIO) W/DEVICE KIT    Use to check glucose 4 times daily.     BUTALBITAL-ACETAMINOPHEN-CAFFEINE (FIORICET, ESGIC) -40 MG PER TABLET    Take 1 tablet by mouth every 6 hours as needed for Headaches    COMPRESSION SLEEVES    Left arm    COMPRESSION STOCKINGS MISC    by Does not apply route Zippered stockings for daily use on lower extremities (do not wear at night). 20-30mmHg. CONTINUOUS BLOOD GLUC  (FREESTYLE EMERALD 2 READER) MINDY    To check glucose levels    CONTINUOUS BLOOD GLUC SENSOR (FREESTYLE EMERALD 2 SENSOR) MISC    Change every 14 days    DIAZEPAM (VALIUM) 5 MG TABLET    Take 5 mg by mouth 2 times daily as needed for Anxiety. DULOXETINE (CYMBALTA) 60 MG CAPSULE    Take 60 mg by mouth 2 times daily. ELASTIC BANDAGES & SUPPORTS (COMPRESSION & SUPPORT GLOVES L) MISC    1 each by Does not apply route daily Left hand    ELASTIC BANDAGES & SUPPORTS (MEDICAL COMPRESSION STOCKINGS) MISC    2 each by Does not apply route daily On am/off HS. FEBUXOSTAT 80 MG TABS    TAKE 1 TABLET BY MOUTH EVERY DAY    FERROUS SULFATE (FEROSUL) 325 (65 FE) MG TABLET    TAKE 1 TABLET BY MOUTH TWO TIMES A DAY WITH MEALS    INSULIN ASPART (NOVOLOG FLEXPEN) 100 UNIT/ML INJECTION PEN    inject 30 to 50 units into the skin three time daily before meals    INSULIN PEN NEEDLE (B-D UF III MINI PEN NEEDLES) 31G X 5 MM MISC    use five times daily with insulin    KETOCONAZOLE (NIZORAL) 2 % SHAMPOO    Apply topically daily as needed.     LEFLUNOMIDE (ARAVA) 20 MG TABLET    TAKE 1 TABLET BY MOUTH EVERY DAY    LEVOTHYROXINE (SYNTHROID) 150 MCG TABLET    TAKE 1 TABLET BY MOUTH IN THE MORNING before breakfast    LIDOCAINE 5 % CREA    Apply to feet QID prn pain for peripheral neuropathy    LINZESS 290 MCG CAPS CAPSULE    TAKE 1 CAPSULE BY MOUTH ONE TIME A DAY FOR 90 DAYS    LORATADINE (CLARITIN) 10 MG TABLET    TAKE 1 TABLET BY MOUTH ONE TIME A DAY     LUBIPROSTONE (AMITIZA) 24 MCG CAPSULE    TAKE 1 CAPSULE BY MOUTH TWO TIMES A DAY WITH MEALS    MECLIZINE (ANTIVERT) 25 MG TABLET    Take 1 tablet by mouth 3 times daily as needed for Dizziness    METHOCARBAMOL (ROBAXIN-750) 750 MG TABLET    Take 1 tablet by mouth 3 times daily as needed (pain and spasm)    METOLAZONE (ZAROXOLYN) 2.5 MG TABLET    TAKE 1 TABLET BY MOUTH TWO TIMES A WEEK AS NEEDED FOR WEIGHT OVER 180 POUNDS. DO NOT TAKE 2 DAYS IN A ROW. METOPROLOL TARTRATE (LOPRESSOR) 25 MG TABLET    TAKE 1 TABLET BY MOUTH TWO TIMES A DAY    MINERAL OIL-HYDROPHILIC PETROLATUM (HYDROPHOR) OINTMENT    Apply topically 4 times daily as needed to hands and feet. MONTELUKAST (SINGULAIR) 10 MG TABLET    Take 1 tablet by mouth nightly    NITROGLYCERIN (NITROSTAT) 0.4 MG SL TABLET    PLACE ONE TABLET UNDER TONGUE AS NEEDED FOR CHEST PAIN, MAY REPEAT EVERY 5 MINUTES AS NEEDED UP TO A MAX OF 3 TABLETS. IF NO RELIEF AFTER 1 DOSE, CALL 911. NYSTATIN (MYCOSTATIN) 920147 UNIT/ML SUSPENSION    Take 5 mLs by mouth 4 times daily    OMEPRAZOLE (PRILOSEC) 20 MG DELAYED RELEASE CAPSULE    TAKE 1 CAPSULE BY MOUTH TWO TIMES A DAY    ONETOUCH VERIO STRIP    use to test blood sugar 5 times daily    OXYCODONE (OXYCONTIN) 20 MG EXTENDED RELEASE TABLET    Take 20 mg by mouth every 12 hours. OXYCODONE-ACETAMINOPHEN (PERCOCET)  MG PER TABLET    Take 1 tablet by mouth every 4 hours as needed for Pain .  Earliest Fill Date: 6/8/17    OXYGEN    Inhale 2 L into the lungs nightly Sometimes with activity    POLYETHYLENE GLYCOL (GLYCOLAX) 17 GM/SCOOP POWDER    TAKE 17 GRAMS (1 CAPFUL) BY MOUTH NIGHTLY    PREGABALIN (LYRICA) 75 MG CAPSULE    TAKE 1 CAPSULE BY MOUTH TWO TIMES A DAY    RANOLAZINE (RANEXA) 500 MG EXTENDED RELEASE TABLET    TAKE 1 TABLET BY MOUTH TWO TIMES A DAY    REXULTI 2 MG TABS TABLET    Take 2 mg by mouth at bedtime     SIMVASTATIN (ZOCOR) 20 MG TABLET    TAKE 1 TABLET BY MOUTH one time daily at night    SM SENNA-S 8.6-50 MG PER TABLET    TAKE 2 TABLETS BY MOUTH TWO TIMES A DAY    SOLIFENACIN (VESICARE) 10 MG TABLET    Take 1 tablet by mouth daily    SPIRONOLACTONE (ALDACTONE) 50 MG TABLET    TAKE 2 TABLETS BY MOUTH IN THE MORNING and 1 tablet in the evening    STEGLATRO 5 MG TABS    TAKE 1 TABLET BY MOUTH EVERY DAY    SULFASALAZINE (AZULFIDINE) 500 MG TABLET    TAKE 1 TABLET BY MOUTH TWO TIMES A DAY    TORSEMIDE (DEMADEX) 100 MG TABLET    TAKE 1 TABLET BY MOUTH IN THE MORNING AND 1/2 TABLET IN THE EVENING. TRAZODONE (DESYREL) 50 MG TABLET    Take 1 tablet by mouth nightly Take it on the day of sleep study    TRESIBA FLEXTOUCH 200 UNIT/ML SOPN    INJECT 200 UNITS SUBCUTANEOUSLY ONE TIME DAILY    UREA 40 % LOTN    Apply to feet nightly and cover with Aquaphor    VITAMIN D3 (CHOLECALCIFEROL) 25 MCG (1000 UT) TABS TABLET    TAKE 3 TABLETS BY MOUTH ONE TIME A DAY       ALLERGIES     Iv dye [iodides], Diazepam, Insulin glargine, and Trazodone and nefazodone    FAMILYHISTORY       Family History   Problem Relation Age of Onset    Asthma Other     Cancer Other     Depression Other     Diabetes Other     Hypertension Other     High Cholesterol Other     Migraines Other     Heart Attack Father 72         of MI    High Blood Pressure Mother     Diabetes Mother         SOCIAL HISTORY       Social History     Tobacco Use    Smoking status: Never    Smokeless tobacco: Never   Vaping Use    Vaping Use: Never used   Substance Use Topics    Alcohol use: No    Drug use: No       SCREENINGS   NIH Stroke Scale  Interval: Baseline  Level of Consciousness (1a): Alert  LOC Questions (1b): Answers both correctly  LOC Commands (1c): Performs both tasks correctly  Best Gaze (2): Normal  Visual (3): No visual loss  Facial Palsy (4): Normal symmetrical movement  Motor Arm, Left (5a): Drift, but does not hit bed  Motor Arm, Right (5b): No drift  Motor Leg, Left (6a): Drift, but does not hit bed  Motor Leg, Right (6b):  No drift  Limb Ataxia (7): (!) Present in two limbs  Sensory (8): (!) Mild to Moderate (left arm)  Best Language (9): Mild to moderate aphasia  Dysarthria (10): Mild to moderate, slurs some words  Extinction and Inattention (11): No abnormality  Total: 7    Commodore Coma Scale  Eye Opening: Spontaneous  Best Verbal Response: Confused  Best Motor Response: Obeys commands  Dominga Coma Scale Score: 14                CIWA Assessment  BP: 106/61  Heart Rate: 65           PHYSICAL EXAM  1 or more Elements     ED Triage Vitals [03/09/23 1429]   BP Temp Temp Source Heart Rate Resp SpO2 Height Weight   (!) 144/75 98.2 °F (36.8 °C) Oral 70 16 94 % 5' (1.524 m) 202 lb (91.6 kg)       Physical Exam  Vitals and nursing note reviewed. Constitutional:       Appearance: Normal appearance. She is well-developed. She is not toxic-appearing or diaphoretic. HENT:      Head: Normocephalic and atraumatic. Right Ear: External ear normal.      Left Ear: External ear normal.      Nose: Nose normal.      Mouth/Throat:      Mouth: Mucous membranes are dry. Pharynx: Oropharynx is clear. Eyes:      General: No scleral icterus. Right eye: No discharge. Left eye: No discharge. Extraocular Movements: Extraocular movements intact. Conjunctiva/sclera: Conjunctivae normal.      Pupils: Pupils are equal, round, and reactive to light. Cardiovascular:      Rate and Rhythm: Normal rate. Pulmonary:      Effort: Pulmonary effort is normal.      Breath sounds: Normal breath sounds. Abdominal:      General: Bowel sounds are normal.      Palpations: Abdomen is soft. Tenderness: There is no abdominal tenderness. Musculoskeletal:         General: Normal range of motion. Cervical back: Normal range of motion. Comments: Chronic edema in LUE and RLE, unchanged from baseline. Skin:     General: Skin is warm and dry. Coloration: Skin is not jaundiced or pale. Findings: No bruising, erythema, lesion or rash. Neurological:      Mental Status: She is alert. Comments: Oriented to person and place but not to time  4/5 left upper and lower extremity strength  Paresthesia left upper extremity  No facial droop or paresthesia.   Has some difficulty with bilateral finger to nose coordination  Slight tremor in bilateral hands     Psychiatric:         Behavior: Behavior normal.           DIAGNOSTIC RESULTS   LABS:    Labs Reviewed   CBC WITH AUTO DIFFERENTIAL - Abnormal; Notable for the following components:       Result Value    Hemoglobin 11.6 (*)     Hematocrit 35.1 (*)     All other components within normal limits   COMPREHENSIVE METABOLIC PANEL - Abnormal; Notable for the following components:    Sodium 128 (*)     Chloride 84 (*)     Glucose 192 (*)     BUN 68 (*)     Creatinine 2.3 (*)     Est, Glom Filt Rate 25 (*)     Albumin/Globulin Ratio 0.9 (*)     AST 84 (*)     All other components within normal limits   URINALYSIS WITH REFLEX TO CULTURE - Abnormal; Notable for the following components:    Glucose, Ur 500 (*)     All other components within normal limits   TROPONIN - Abnormal; Notable for the following components:    Troponin 0.03 (*)     All other components within normal limits   BRAIN NATRIURETIC PEPTIDE - Abnormal; Notable for the following components:    Pro- (*)     All other components within normal limits   POCT GLUCOSE - Abnormal; Notable for the following components:    POC Glucose 191 (*)     All other components within normal limits   MAGNESIUM   PROTIME-INR   APTT   LACTATE, SEPSIS   LACTATE, SEPSIS   POCT GLUCOSE       When ordered only abnormal lab results are displayed. All other labs were within normal range or not returned as of this dictation. EKG: When ordered, EKG's are interpreted by the Emergency Department Physician in the absence of a cardiologist.  Please see their note for interpretation of EKG.     RADIOLOGY:   Non-plain film images such as CT, Ultrasound and MRI are read by the radiologist. Plain radiographic images are visualized and preliminarily interpreted by the ED Provider with the below findings:        Interpretation per the Radiologist below, if available at the time of this note:    XR CHEST PORTABLE   Final Result   Pulmonary vascular congestion along with increased interstitial opacity,   suggesting interstitial edema. CT HEAD WO CONTRAST   Final Result   Small suspected lacunar infarct within the right paramedian miri. No large territory acute cortical infarct, hemorrhage or intracranial mass   effect. The findings were sent to the Radiology Results [address] at 2:56   pm on 3/9/2023 to be communicated to a licensed caregiver, received by   ALBERTO Marie at 2:59 pm.         MRI BRAIN [facility]    (Results Pending)   MRA HEAD WO CONTRAST    (Results Pending)   MRA NECK WO CONTRAST    (Results Pending)         PROCEDURES   Unless otherwise noted below, none     Procedures    CRITICAL CARE TIME (.cctime)   There was a high probability of life-threatening clinical deterioration in the patient's condition requiring my urgent intervention. I personally saw the patient and independently provided 80 minutes of non-concurrent critical care out of the total shared critical care time provided, excluding separately reportable procedures.          PAST MEDICAL HISTORY         Chronic Conditions affecting Care:  has a past medical history of Anxiety, Anxiety and depression, Arthritis, Asthma, CAD (coronary artery disease), Cancer ([facility] [address] (2007), Cerebral artery occlusion with cerebral infarction [facility]), CHF (congestive heart failure) ([facility] [address] (2018), Chronic kidney disease, Chronic pain, Constipation, COPD (chronic obstructive pulmonary disease) ([facility] [address] Depression, Diabetes mellitus ([facility] [address] Diabetic polyneuropathy associated with type 2 diabetes mellitus ([facility] [address] (2/2/2018), Dysthymia (2/2/2018), ESBL (extended spectrum beta-lactamase) producing bacteria infection (03/14/2018), Fibromyalgia, Gastric ulcer, unspecified as acute or chronic, without mention of hemorrhage, perforation, or obstruction, GERD (gastroesophageal reflux disease), Gout, HIGH CHOLESTEROL, Hypertension, Hypothyroidism, Pneumonia (3/25/2020), Pyelonephritis, Severe persistent asthma without complication (0/4/0576), Thyroid disease, and TIA (transient ischemic attack). EMERGENCY DEPARTMENT COURSE and DIFFERENTIAL DIAGNOSIS/MDM:   Vitals:    Vitals:    03/09/23 1429 03/09/23 1722   BP: (!) 144/75 106/61   Pulse: 70 65   Resp: 16 15   Temp: 98.2 °F (36.8 °C)    TempSrc: Oral    SpO2: 94% 100%   Weight: 202 lb (91.6 kg)    Height: 5' (1.524 m)        Patient was given the following medications:  Medications   aspirin chewable tablet 324 mg (has no administration in time range)   0.9 % sodium chloride bolus (has no administration in time range)             Is this patient to be included in the SEP-1 Core Measure due to severe sepsis or septic shock? No   Exclusion criteria - the patient is NOT to be included for SEP-1 Core Measure due to: Infection is not suspected    CONSULTS: (Who and What was discussed)  IP CONSULT TO HOSPITALIST  Discussion with Other Profesionals : Admitting Team hospitalist paged at [phone] and Consultant I discussed the case with  stroke team Dr Layne Amaya at [phone]. Since patient has anaphylaxis to CT IV dye, he recommends plain head CT and MRI and MRA without contrast.  I spoke with [facility] at [phone] about abnormal head CT findings. I spoke with Dr Ward Monday . He reviewed the head CT and says that there is no obvious LVO or bleed. He would like patient to receive an aspirin and MRI/MRA can be done during her admission; it does not need to be done stat in the ER. Patient is not a TNK candidate. Social Determinants : language barrier    Records Reviewed : Outpatient Notes oncology and cardiology notes    CC/HPI Summary, DDx, ED Course, and Reassessment: This patient presents to the emergency department with complaints of slurred speech, word finding difficulties,Shaky, weak on the left side primarily, dizzy.   Patient has been feeling dizzy and shaky for several days however the other symptoms started after she fell and hit her head yesterday evening. CT head does show acute infarct. I discussed the case with  stroke team and patient is not a TNK candidate. He advises for aspirin and admission for MRI/MRI. This does not need to be done emergently. Blood reveals hyponatremia, acute on chronic renal insufficiency and a mildly elevated troponin. Patient denies chest pain and the troponin may be linked to the renal disease. Will likely need to be trended during her admission. Patient and family understand and agree with plan for admission    Disposition Considerations (include 1 Tests not done, Shared Decision Making, Pt Expectation of Test or Tx.): admit. Small 500 cc bolus for MARIA D, since there is some pulmonary vascular congestion seen on CXR. I am the Primary Clinician of Record. FINAL IMPRESSION      1. Acute CVA (cerebrovascular accident) ([address]    2. Closed head injury, initial encounter    3. Left-sided weakness    4. Slurred speech    5. Acute renal failure superimposed on chronic kidney disease, unspecified CKD stage, unspecified acute renal failure type ([address]    6. Hyponatremia    7. Elevated troponin          DISPOSITION/PLAN     DISPOSITION Decision To Admit 03/09/2023 05:32:06 PM      PATIENT REFERRED TO:  No follow-up provider specified.     DISCHARGE MEDICATIONS:  New Prescriptions    No medications on file       DISCONTINUED MEDICATIONS:  Discontinued Medications    No medications on file              (Please note that portions of this note were completed with a voice recognition program.  Efforts were made to edit the dictations but occasionally words are mis-transcribed.)    Jaron Webster PA-C (electronically signed)            aJron Webster PA-C  03/09/23 4872

## 2023-03-10 PROBLEM — R79.89 ELEVATED TROPONIN: Status: ACTIVE | Noted: 2023-03-10

## 2023-03-10 PROBLEM — G93.41 ENCEPHALOPATHY, METABOLIC: Status: ACTIVE | Noted: 2023-03-10

## 2023-03-10 PROBLEM — R77.8 ELEVATED TROPONIN: Status: ACTIVE | Noted: 2023-03-10

## 2023-03-10 LAB
CHOLESTEROL, TOTAL: 226 MG/DL (ref 0–199)
EKG ATRIAL RATE: 67 BPM
EKG DIAGNOSIS: NORMAL
EKG P AXIS: 16 DEGREES
EKG P-R INTERVAL: 156 MS
EKG Q-T INTERVAL: 440 MS
EKG QRS DURATION: 82 MS
EKG QTC CALCULATION (BAZETT): 464 MS
EKG R AXIS: 24 DEGREES
EKG T AXIS: 23 DEGREES
EKG VENTRICULAR RATE: 67 BPM
GLUCOSE BLD-MCNC: 184 MG/DL (ref 70–99)
GLUCOSE BLD-MCNC: 295 MG/DL (ref 70–99)
GLUCOSE BLD-MCNC: 297 MG/DL (ref 70–99)
GLUCOSE BLD-MCNC: 303 MG/DL (ref 70–99)
HCT VFR BLD CALC: 32.4 % (ref 36–48)
HDLC SERPL-MCNC: 25 MG/DL (ref 40–60)
HEMOGLOBIN: 10.7 G/DL (ref 12–16)
LDL CHOLESTEROL CALCULATED: ABNORMAL MG/DL
LDL CHOLESTEROL DIRECT: 120 MG/DL
MCH RBC QN AUTO: 28.5 PG (ref 26–34)
MCHC RBC AUTO-ENTMCNC: 32.9 G/DL (ref 31–36)
MCV RBC AUTO: 86.7 FL (ref 80–100)
PDW BLD-RTO: 13.5 % (ref 12.4–15.4)
PERFORMED ON: ABNORMAL
PLATELET # BLD: 267 K/UL (ref 135–450)
PMV BLD AUTO: 9.3 FL (ref 5–10.5)
RBC # BLD: 3.74 M/UL (ref 4–5.2)
TRIGL SERPL-MCNC: 530 MG/DL (ref 0–150)
TROPONIN: 0.02 NG/ML
VLDLC SERPL CALC-MCNC: ABNORMAL MG/DL
WBC # BLD: 8.2 K/UL (ref 4–11)

## 2023-03-10 PROCEDURE — 80061 LIPID PANEL: CPT

## 2023-03-10 PROCEDURE — 97530 THERAPEUTIC ACTIVITIES: CPT

## 2023-03-10 PROCEDURE — 51702 INSERT TEMP BLADDER CATH: CPT

## 2023-03-10 PROCEDURE — 94640 AIRWAY INHALATION TREATMENT: CPT

## 2023-03-10 PROCEDURE — 92610 EVALUATE SWALLOWING FUNCTION: CPT

## 2023-03-10 PROCEDURE — 97166 OT EVAL MOD COMPLEX 45 MIN: CPT

## 2023-03-10 PROCEDURE — 51798 US URINE CAPACITY MEASURE: CPT

## 2023-03-10 PROCEDURE — 6370000000 HC RX 637 (ALT 250 FOR IP): Performed by: NURSE PRACTITIONER

## 2023-03-10 PROCEDURE — 6370000000 HC RX 637 (ALT 250 FOR IP): Performed by: INTERNAL MEDICINE

## 2023-03-10 PROCEDURE — 83721 ASSAY OF BLOOD LIPOPROTEIN: CPT

## 2023-03-10 PROCEDURE — 36591 DRAW BLOOD OFF VENOUS DEVICE: CPT

## 2023-03-10 PROCEDURE — 2700000000 HC OXYGEN THERAPY PER DAY

## 2023-03-10 PROCEDURE — 83036 HEMOGLOBIN GLYCOSYLATED A1C: CPT

## 2023-03-10 PROCEDURE — 97162 PT EVAL MOD COMPLEX 30 MIN: CPT

## 2023-03-10 PROCEDURE — 99223 1ST HOSP IP/OBS HIGH 75: CPT | Performed by: INTERNAL MEDICINE

## 2023-03-10 PROCEDURE — 93010 ELECTROCARDIOGRAM REPORT: CPT | Performed by: INTERNAL MEDICINE

## 2023-03-10 PROCEDURE — 6360000002 HC RX W HCPCS: Performed by: INTERNAL MEDICINE

## 2023-03-10 PROCEDURE — 99223 1ST HOSP IP/OBS HIGH 75: CPT | Performed by: PSYCHIATRY & NEUROLOGY

## 2023-03-10 PROCEDURE — 92526 ORAL FUNCTION THERAPY: CPT

## 2023-03-10 PROCEDURE — 84484 ASSAY OF TROPONIN QUANT: CPT

## 2023-03-10 PROCEDURE — 2060000000 HC ICU INTERMEDIATE R&B

## 2023-03-10 PROCEDURE — 85027 COMPLETE CBC AUTOMATED: CPT

## 2023-03-10 RX ORDER — INSULIN GLARGINE 100 [IU]/ML
50 INJECTION, SOLUTION SUBCUTANEOUS NIGHTLY
Status: DISCONTINUED | OUTPATIENT
Start: 2023-03-10 | End: 2023-03-13 | Stop reason: HOSPADM

## 2023-03-10 RX ORDER — TORSEMIDE 20 MG/1
20 TABLET ORAL DAILY
Status: DISCONTINUED | OUTPATIENT
Start: 2023-03-10 | End: 2023-03-13 | Stop reason: HOSPADM

## 2023-03-10 RX ORDER — SENNA AND DOCUSATE SODIUM 50; 8.6 MG/1; MG/1
2 TABLET, FILM COATED ORAL 2 TIMES DAILY
Status: DISCONTINUED | OUTPATIENT
Start: 2023-03-10 | End: 2023-03-13 | Stop reason: HOSPADM

## 2023-03-10 RX ORDER — INSULIN LISPRO 100 [IU]/ML
15 INJECTION, SOLUTION INTRAVENOUS; SUBCUTANEOUS
Status: DISCONTINUED | OUTPATIENT
Start: 2023-03-10 | End: 2023-03-13 | Stop reason: HOSPADM

## 2023-03-10 RX ADMIN — ATORVASTATIN CALCIUM 80 MG: 80 TABLET, FILM COATED ORAL at 22:43

## 2023-03-10 RX ADMIN — TRAZODONE HYDROCHLORIDE 50 MG: 50 TABLET ORAL at 22:43

## 2023-03-10 RX ADMIN — INSULIN LISPRO 8 UNITS: 100 INJECTION, SOLUTION INTRAVENOUS; SUBCUTANEOUS at 17:10

## 2023-03-10 RX ADMIN — INSULIN LISPRO 4 UNITS: 100 INJECTION, SOLUTION INTRAVENOUS; SUBCUTANEOUS at 22:44

## 2023-03-10 RX ADMIN — METOPROLOL TARTRATE 25 MG: 25 TABLET, FILM COATED ORAL at 22:43

## 2023-03-10 RX ADMIN — LEFLUNOMIDE 20 MG: 20 TABLET ORAL at 08:34

## 2023-03-10 RX ADMIN — INSULIN LISPRO 15 UNITS: 100 INJECTION, SOLUTION INTRAVENOUS; SUBCUTANEOUS at 17:09

## 2023-03-10 RX ADMIN — RANOLAZINE 500 MG: 500 TABLET, EXTENDED RELEASE ORAL at 22:43

## 2023-03-10 RX ADMIN — ASPIRIN 81 MG: 81 TABLET, COATED ORAL at 08:34

## 2023-03-10 RX ADMIN — ALBUTEROL SULFATE 2.5 MG: 2.5 SOLUTION RESPIRATORY (INHALATION) at 20:50

## 2023-03-10 RX ADMIN — OXYCODONE HYDROCHLORIDE 10 MG: 10 TABLET, FILM COATED, EXTENDED RELEASE ORAL at 22:43

## 2023-03-10 RX ADMIN — OXYCODONE HYDROCHLORIDE 10 MG: 10 TABLET, FILM COATED, EXTENDED RELEASE ORAL at 08:35

## 2023-03-10 RX ADMIN — TORSEMIDE 20 MG: 20 TABLET ORAL at 12:11

## 2023-03-10 RX ADMIN — HEPARIN SODIUM 5000 UNITS: 5000 INJECTION INTRAVENOUS; SUBCUTANEOUS at 22:43

## 2023-03-10 RX ADMIN — LEVOTHYROXINE SODIUM 150 MCG: 150 TABLET ORAL at 07:38

## 2023-03-10 RX ADMIN — METOPROLOL TARTRATE 25 MG: 25 TABLET, FILM COATED ORAL at 08:35

## 2023-03-10 RX ADMIN — DULOXETINE HYDROCHLORIDE 60 MG: 60 CAPSULE, DELAYED RELEASE ORAL at 08:34

## 2023-03-10 RX ADMIN — MONTELUKAST SODIUM 10 MG: 10 TABLET, FILM COATED ORAL at 22:43

## 2023-03-10 RX ADMIN — BENZTROPINE MESYLATE 1 MG: 1 TABLET ORAL at 22:43

## 2023-03-10 RX ADMIN — INSULIN LISPRO 8 UNITS: 100 INJECTION, SOLUTION INTRAVENOUS; SUBCUTANEOUS at 11:56

## 2023-03-10 RX ADMIN — DULOXETINE HYDROCHLORIDE 60 MG: 60 CAPSULE, DELAYED RELEASE ORAL at 22:43

## 2023-03-10 RX ADMIN — CLOPIDOGREL BISULFATE 75 MG: 75 TABLET ORAL at 08:35

## 2023-03-10 RX ADMIN — HEPARIN SODIUM 5000 UNITS: 5000 INJECTION INTRAVENOUS; SUBCUTANEOUS at 08:36

## 2023-03-10 RX ADMIN — POLYETHYLENE GLYCOL 3350 17 G: 17 POWDER, FOR SOLUTION ORAL at 18:05

## 2023-03-10 RX ADMIN — RANOLAZINE 500 MG: 500 TABLET, EXTENDED RELEASE ORAL at 08:35

## 2023-03-10 RX ADMIN — INSULIN GLARGINE 50 UNITS: 100 INJECTION, SOLUTION SUBCUTANEOUS at 22:44

## 2023-03-10 ASSESSMENT — PAIN SCALES - GENERAL: PAINLEVEL_OUTOF10: 4

## 2023-03-10 ASSESSMENT — PAIN DESCRIPTION - LOCATION: LOCATION: LEG

## 2023-03-10 ASSESSMENT — PAIN DESCRIPTION - ORIENTATION: ORIENTATION: RIGHT

## 2023-03-10 NOTE — PROGRESS NOTES
HOSPITALISTS PROGRESS NOTE    3/10/2023 12:23 PM        Name: Dayo Harrison . Admitted: 3/9/2023  Primary Care Provider: Mariana Russo DO (Tel: [phone])      Problem List  MARIA D on CKD  Lacunar infarct  Urinary retention  History of congestive heart failure  Anemia  Hyponatremia  Lymphedema  History of breast cancer  History of chronic respiratory failure  History of seronegative rheumatoid arthritis  Positive BRENNA  Chronic gout  Diabetes mellitus    Assessment & Plan: MARIA D with CKD with urinary retention  Discussed with nephrology and  For urinary retention we did place a Carmona catheter  Monitor renal functions    Neurology consulted for consideration of possible stroke  Patient is already on aspirin Plavix and high intensity statin    Patient is on significantly high amount of Premeal insulin along with long-acting insulin. We will start Lantus and Premeal insulin at a lower dose. Diet: ADULT DIET; Dysphagia - Soft and Bite Sized; 5 carb choices (75 gm/meal); Low Fat/Low Chol/High Fiber/2 gm Na  Code:Full Code  DVT PPX heparin  Disposition monitor in the hospital      Brief Course:  47 y.o. female who presents to the emergency department stating that she has been feeling intermittently dizzy and shaky for several days. Yesterday evening, she went to the kitchen to make her cell something to eat and forgot to wear her oxygen.  states that sometimes when she forgets to wear her oxygen, she gets increasingly lightheaded and shaky. She pressed on the bottom of the trash can to open the lid and became dizzy and fell over and struck her head on the ground. There was no loss of consciousness. Patient yelled out for her  in the next room. He helped her up and states that since then she has been feeling extremely dizzy, weak, shaky. Her left arm and leg are weaker than her right since she hit her head. In review of her chart, she does have history of TIA with left-sided weakness. She wears 3 L of oxygen at all time. She has history of breast cancer in remission. She has chronic lymphedema in the left upper extremity and right lower extremity. She is able to speak some [place] but  does help with translation and secondary to her Buddhism, she prefers not to use an . CC: Weakness    Subjective:  .   Patient appears chronically ill   is at bedside who is able to translate for the patient patient does speaks broken [place]  As per the  patient did had a fall with loss of consciousness  CT scan did show possible lacunar infarct MRI does not show acuity  Patient did had a urinary retention Carmona was placed  Blood sugars are in high 200s range at this time  Reviewed interval ancillary notes    Current Medications  torsemide (DEMADEX) tablet 20 mg, Daily  aspirin EC tablet 81 mg, Daily  benztropine (COGENTIN) tablet 1 mg, Nightly  butalbital-acetaminophen-caffeine (FIORICET, ESGIC) per tablet 1 tablet, Q6H PRN  diazePAM (VALIUM) tablet 5 mg, BID PRN  DULoxetine (CYMBALTA) extended release capsule 60 mg, BID  leflunomide (ARAVA) tablet 20 mg, Daily  levothyroxine (SYNTHROID) tablet 150 mcg, Daily  metoprolol tartrate (LOPRESSOR) tablet 25 mg, BID  montelukast (SINGULAIR) tablet 10 mg, Nightly  nitroGLYCERIN (NITROSTAT) SL tablet 0.4 mg, Q5 Min PRN  insulin lispro (HUMALOG) injection vial 0-16 Units, TID WC  insulin lispro (HUMALOG) injection vial 0-4 Units, Nightly  traZODone (DESYREL) tablet 50 mg, Nightly  ranolazine (RANEXA) extended release tablet 500 mg, BID  ondansetron (ZOFRAN-ODT) disintegrating tablet 4 mg, Q8H PRN   Or  ondansetron (ZOFRAN) injection 4 mg, Q6H PRN  polyethylene glycol (GLYCOLAX) packet 17 g, Daily PRN  perflutren lipid microspheres (DEFINITY) injection 1.5 mL, ONCE PRN  acetaminophen (TYLENOL) tablet 650 mg, Q4H PRN   Or  acetaminophen (TYLENOL) suppository 650 mg, Q4H PRN  atorvastatin (LIPITOR) tablet 80 mg, Nightly  labetalol (NORMODYNE;TRANDATE) injection 10 mg, Q10 Min PRN  clopidogrel (PLAVIX) tablet 75 mg, Daily  heparin (porcine) injection 5,000 Units, BID  dextrose bolus 10% 125 mL, PRN   Or  dextrose bolus 10% 250 mL, PRN  glucagon (rDNA) injection 1 mg, PRN  dextrose 10 % infusion, Continuous PRN  albuterol (PROVENTIL) nebulizer solution 2.5 mg, BID  albuterol (PROVENTIL) nebulizer solution 2.5 mg, Q4H PRN  oxyCODONE (OXYCONTIN) extended release tablet 10 mg, 2 times per day  meclizine (ANTIVERT) tablet 12.5 mg, TID PRN  oxyCODONE (ROXICODONE) immediate release tablet 5 mg, Q6H PRN        Objective:  /68   Pulse 68   Temp 98.1 °F (36.7 °C) (Oral)   Resp 16   Ht 5' 4\" (1.626 m)   Wt 203 lb 11.2 oz (92.4 kg)   SpO2 92%   BMI 34.97 kg/m²     Intake/Output Summary (Last 24 hours) at 3/10/2023 1223  Last data filed at 3/10/2023 1203  Gross per 24 hour   Intake 891.7 ml   Output 2981 ml   Net -2089.3 ml      Wt Readings from Last 3 Encounters:   03/10/23 203 lb 11.2 oz (92.4 kg)   01/26/23 202 lb (91.6 kg)   01/16/23 203 lb 3.2 oz (92.2 kg)     Left upper extremity is significantly swollen probably related to chronic lymphedema  On oxygen  General appearance:  Appears comfortable  Eyes: Sclera clear. Pupils equal.  ENT: Moist oral mucosa. Trachea midline, no adenopathy. Cardiovascular: Regular rhythm, normal S1, S2. No murmur. No edema in lower extremities  Respiratory: Not using accessory muscles. Good inspiratory effort. Clear to auscultation bilaterally, no wheeze or crackles. GI: Abdomen soft, no tenderness, not distended, normal bowel sounds  Musculoskeletal: No cyanosis in digits, neck supple  Neurology: CN 2-12 grossly intact. No speech or motor deficits  Psych: Normal affect.  Alert and oriented in time, place and person      Labs and Tests:  CBC:   Recent Labs     03/09/23  1544 03/10/23  0200   WBC 10.7 8.2   HGB 11.6* 10.7*  267     BMP:    Recent Labs     03/09/23  1544   *   K 4.6   CL 84*   CO2 32   BUN 68*   CREATININE 2.3*   GLUCOSE 192*     Hepatic:   Recent Labs     03/09/23  1544   AST 84*   ALT 22   BILITOT <0.2   ALKPHOS 106     MRA NECK WO CONTRAST   Final Result   No acute intracranial abnormality. Old lacunar infarct in the right central miri. Mild parenchymal volume loss. Minimal chronic microvascular disease. Motion artifact. Otherwise, unremarkable noncontrast MRA of the head. Motion artifact. No eyes, unremarkable noncontrast MRA of the neck. MRA HEAD WO CONTRAST   Final Result   No acute intracranial abnormality. Old lacunar infarct in the right central miri. Mild parenchymal volume loss. Minimal chronic microvascular disease. Motion artifact. Otherwise, unremarkable noncontrast MRA of the head. Motion artifact. No eyes, unremarkable noncontrast MRA of the neck. MRI BRAIN WO CONTRAST MR WITNESS   Final Result   No acute intracranial abnormality. Old lacunar infarct in the right central miri. Mild parenchymal volume loss. Minimal chronic microvascular disease. Motion artifact. Otherwise, unremarkable noncontrast MRA of the head. Motion artifact. No eyes, unremarkable noncontrast MRA of the neck. XR CHEST PORTABLE   Final Result   Pulmonary vascular congestion along with increased interstitial opacity,   suggesting interstitial edema. CT HEAD WO CONTRAST   Final Result   Small suspected lacunar infarct within the right paramedian miri. No large territory acute cortical infarct, hemorrhage or intracranial mass   effect.       The findings were sent to the Radiology Results [address] at 2:56   pm on 3/9/2023 to be communicated to a licensed caregiver, received by   ALBERTO Pelletier at 2:59 pm.             Discussed care with family   at bedside        Paulo Davenport MD 3/10/2023 12:23 PM

## 2023-03-10 NOTE — PROGRESS NOTES
[facility] Department   Phone: [phone]    Physical Therapy    [x] Initial Evaluation            [] Daily Treatment Note         [] Discharge Summary      Patient: Matteo Rae   : 1970   MRN: [id number]   Date of Service:  3/10/2023  Admitting Diagnosis: Acute CVA (cerebrovascular accident) [facility])  Current Admission Summary: Matteo Rae is a 47 y.o. female who presents to the emergency department stating that she has been feeling intermittently dizzy and shaky for several days. Yesterday evening, she went to the kitchen to make her cell something to eat and forgot to wear her oxygen.  states that sometimes when she forgets to wear her oxygen, she gets increasingly lightheaded and shaky. She pressed on the bottom of the trash can to open the lid and became dizzy and fell over and struck her head on the ground. There was no loss of consciousness. Patient yelled out for her  in the next room. He helped her up and states that since then she has been feeling extremely dizzy, weak, shaky. Her left arm and leg are weaker than her right since she hit her head. In review of her chart, she does have history of TIA with left-sided weakness. She wears 3 L of oxygen at all time. She has history of breast cancer in remission. She has chronic lymphedema in the left upper extremity and right lower extremity. She is able to speak some [place] but  does help with translation and secondary to her [employer], she prefers not to use an .    Past Medical History:  has a past medical history of Anxiety, Anxiety and depression, Arthritis, Asthma, CAD (coronary artery disease), Cancer ([address] Cerebral artery occlusion with cerebral infarction ([facility] [address] CHF (congestive heart failure) ([facility] [address] Chronic kidney disease, Chronic pain, Constipation, COPD (chronic obstructive pulmonary disease) ([address] Depression, Diabetes mellitus ([facility] [address] Diabetic polyneuropathy associated with type 2 diabetes mellitus ([facility] [address] Dysthymia, ESBL (extended spectrum beta-lactamase) producing bacteria infection, Fibromyalgia, Gastric ulcer, unspecified as acute or chronic, without mention of hemorrhage, perforation, or obstruction, GERD (gastroesophageal reflux disease), Gout, HIGH CHOLESTEROL, Hypertension, Hypothyroidism, Pneumonia, Pyelonephritis, Severe persistent asthma without complication, Thyroid disease, and TIA (transient ischemic attack). Past Surgical History:  has a past surgical history that includes Breast surgery; Hysterectomy (2005); Carpal tunnel release; Tonsillectomy; Finger contracture surgery; Upper gastrointestinal endoscopy (2019); and Mastectomy. Discharge Recommendations: Chaka Robles scored a 12/24 on the AM-PAC short mobility form. Current research shows that an AM-PAC score of 17 or less is typically not associated with a discharge to the patient's home setting. Based on the patient's AM-PAC score and their current functional mobility deficits, it is recommended that the patient have 5-7 sessions per week of Physical Therapy at d/c to increase the patient's independence. At this time, this patient demonstrates complex nursing, medical, and rehabilitative needs, and would benefit from intensive rehabilitation services upon discharge from the Inpatient setting. This patient demonstrates the ability to participate in and benefit from an intensive therapy program with a coordinated interdisciplinary team approach to foster frequent, structured, and documented communication among disciplines, who will work together to establish, prioritize, and achieve treatment goals. Please see assessment section for further patient specific details. If patient discharges prior to next session this note will serve as a discharge summary. Please see below for the latest assessment towards goals.     DME Required For Discharge: DME to be determined at next level of care  Precautions/Restrictions: high fall risk, up as tolerated  Weight Bearing Restrictions: no restrictions  Required Braces/Orthotics: no braces required  Positional Restrictions:no positional restrictions    Pre-Admission Information   Lives With: spouse, son                      Type of Home: house  Home Layout: one level  Home Access: level entry   [address]: tub/shower unit, walk in shower, pt uses tub/shower  Bathroom Equipment: grab bars in shower  Toilet Height: standard height  Home Equipment: single point cane  Transfer Assistance: Independent without use of device  Ambulation Assistance:Independent without use of device  ADL Assistance: requires assistance with bathing, IND with dressing, toileting, grooming  IADL Assistance: independent with homemaking tasks, family assists as well  Active :        [] Yes                 [x] No  Hand Dominance: [] Left                 [x] Right  Recent Falls: pt reports 2 recent falls in last 30 days    Examination   Vision:   Vision Gross Assessment: Impaired and Vision Corrective Device: wears glasses for distance  Hearing:   WFL  Observation:   General Observation:  Pt on 2L O2 via nasal cannula on arrival.  Edema: Edema located in (L) UE, (R) LE. Edema Level: non pitting edema  Posture:   Mild forward head, rounded shoulders, and increased thoracic kyphosis in sitting and standing. Sensation:   reports numbness and tingling in (B) UE, (B) LE  ROM:   Unable to formally assess due to B hip pain  Strength:   Unable to formally assess due to B hip pain  Therapist Clinical Decision Making (Complexity): medium complexity  Clinical Presentation: evolving      Subjective  General: Pt supine in bed on arrival, agreeable to participate in PT/OT initial evaluation. Pt refuses the use of a video . Pts  present throughout the session and interprets for the pt per her request.  Pain: 9/10.   Location: B hips  Pain Interventions: RN notified and repositioned      Functional Mobility  Bed Mobility  Supine to Sit: maximum assistance  Scooting: maximum assistance- toward EOB  Comments: Supine to sit: HOB elevated, increased time required to complete task, physical assistance required for trunk and LEs  Transfers  Sit to stand transfer: minimal assistance  Stand to sit transfer: minimal assistance  Stand step transfer: moderate assistance  Comments: No AD utilized for transfers. Stand step transfer completed from bed to chair with decreased ability and speed taking steps with the LEs. Pt also attempted to sit in the chair prior to being close enough and lined up with it and required maximal verbal and tactile cues. Ambulation  Ambulation not tested on this date secondary to B hip pain. Comments:    Stair Mobility  Stair mobility not completed on this date. Comments:  Wheelchair Mobility:  No w/c mobility completed on this date. Comments:  Balance  Static Sitting Balance: fair: maintains balance at CGA without use of UE support  Static Standing Balance: poor (+): requires min (A) to maintain balance  Dynamic Standing Balance: poor: requires mod (A) to maintain balance  Comments:    Other Therapeutic Interventions    Functional Outcomes  AM-PAC Inpatient Mobility Raw Score : 12              Cognition  Overall Cognitive Status: Impaired  Arousal/Alterness: inconsistent responses to stimuli  Following Commands: follows one step commands with repetition, follows one step commands with increased time  Safety Judgement: decreased awareness of need for assistance, decreased awareness of need for safety  Insights: decreased awareness of deficits  Initiation: requires cues for some  Sequencing: requires cues for some  Comment: Difficult to assess due to language barrier. Orientation:    alert and oriented x 4  Command Following:   impaired- pt follows one step commands with increased time and repetition of instruction.     Education  Barriers To Learning: cognition and language  Patient Education: patient educated on goals, PT role and benefits, plan of care, general safety, functional mobility training, transfer training, discharge recommendations  Learning Assessment:  patient verbalizes understanding, would benefit from continued reinforcement    Assessment  Activity Tolerance: Pt was significantly limited by B hip pain. Impairments Requiring Therapeutic Intervention: decreased functional mobility, decreased strength, decreased safety awareness, decreased cognition, decreased endurance, decreased sensation, decreased balance, increased pain  Prognosis: good  Clinical Assessment: Pt is a 48 y/o female who presents to the hospital with impaired strength, balance, and functional mobility secondary to a fall. Pt is presenting below her baseline level of function and required up to mod A for performance of functional mobility tasks this date and was unable to ambulate. Pt is normally independent for performance of functional mobility tasks without the use of an AD. Pt will benefit from skilled PT to facilitate return to PLOF and to promote independence. Safety Interventions: patient left in chair, chair alarm in place, call light within reach, gait belt, nurse notified, and family/caregiver present    Plan  Frequency: 5-7 x/week  Current Treatment Recommendations: strengthening, balance training, functional mobility training, transfer training, gait training, endurance training, patient/caregiver education, home exercise program, safety education, and equipment evaluation/education    Goals  Patient Goals:  To return home   Short Term Goals:  Time Frame: By discharge  Patient will complete bed mobility at minimal assistance   Patient will complete transfers at stand by assistance   Patient will ambulate 50 ft with use of LRAD at minimal assistance    Therapy Session Time      Individual Group Co-treatment   Time In     1320   Time Out     1358   Minutes     38     Timed Code Treatment Minutes: 23 Minutes  Total Treatment Minutes: 38 Minutes        Electronically Signed By: Bell Moeller, PT  Danise Scheuermann, DPT [id number]

## 2023-03-10 NOTE — CARE COORDINATION
Discharge Planning Assessment  Discharge Planning Assessment  RN/SW discharge planner met with patient/ (and family member) to discuss reason for admission, current living situation, and potential needs at the time of discharge pt sleeping, completed with  at bedside who speaks english     Demographics/Insurance verified Yes    Current type of dwelling: ranch home with no steps to enter     Patient from ECF/SW confirmed with:n/a     Living arrangements: with spouse     Level of function/Support: requires assistance ,  is caregiver and is supportive. PCP: Mc Smith     Last Visit to PCP: last month     DME:     uses 2-3 liters of oxygen baseline and  thinks provider is [address] States has concentrator and portable concentrator     Active with any community resources/agencies/skilled home care: none     Medication compliance issues: none     Financial issues that could impact healthcare: pt has [employer]         Tentative discharge plan: ARU recommended, snf list provided, hc referral made-  states will take home if pt cannot go to ARU     Discussed and provided facilities of choice if transition to a skilled nursing facility is required at the time of discharge      Discussed with patient and/or family that on the day of discharge home tentative time of discharge will be between 10 AM and noon.     Transportation at the time of discharge: transport service vs  depending on disposition     Felix Mckeon RN, BSN  [phone]

## 2023-03-10 NOTE — RT PROTOCOL NOTE
RT Nebulizer Bronchodilator Protocol Note    There is a bronchodilator order in the chart from a provider indicating to follow the RT Bronchodilator Protocol and there is an Initiate RT Bronchodilator Protocol order as well (see protocol at bottom of note). CXR Findings:  XR CHEST PORTABLE    Result Date: 3/9/2023  Pulmonary vascular congestion along with increased interstitial opacity, suggesting interstitial edema. The findings from the last RT Protocol Assessment were as follows:  Smoking: Chronic pulmonary disease  Respiratory Pattern: Regular pattern and RR 12-20 bpm  Breath Sounds: Slightly diminished and/or crackles  Cough: Strong, spontaneous, non-productive  Indication for Bronchodilator Therapy: Decreased or absent breath sounds  Bronchodilator Assessment Score: 4    Aerosolized bronchodilator medication orders have been revised according to the RT Nebulizer Bronchodilator Protocol below. Respiratory Therapist to perform RT Therapy Protocol Assessment initially then follow the protocol. Repeat RT Therapy Protocol Assessment PRN for score 0-3 or on second treatment, BID, and PRN for scores above 3. No Indications - adjust the frequency to every 6 hours PRN wheezing or bronchospasm, if no treatments needed after 48 hours then discontinue using Per Protocol order mode. If indication present, adjust the RT bronchodilator orders based on the Bronchodilator Assessment Score as indicated below. If a patient is on this medication at home then do not decrease Frequency below that used at home. 0-3 - enter or revise RT bronchodilator order(s) to equivalent RT Bronchodilator order with Frequency of every 4 hours PRN for wheezing or increased work of breathing using Per Protocol order mode.        4-6 - enter or revise RT Bronchodilator order(s) to two equivalent RT bronchodilator orders with one order with BID Frequency and one order with Frequency of every 4 hours PRN wheezing or increased work of breathing using Per Protocol order mode. 7-10 - enter or revise RT Bronchodilator order(s) to two equivalent RT bronchodilator orders with one order with TID Frequency and one order with Frequency of every 4 hours PRN wheezing or increased work of breathing using Per Protocol order mode. 11-13 - enter or revise RT Bronchodilator order(s) to one equivalent RT bronchodilator order with QID Frequency and an Albuterol order with Frequency of every 4 hours PRN wheezing or increased work of breathing using Per Protocol order mode. Greater than 13 - enter or revise RT Bronchodilator order(s) to one equivalent RT bronchodilator order with every 4 hours Frequency and an Albuterol order with Frequency of every 2 hours PRN wheezing or increased work of breathing using Per Protocol order mode. RT to enter RT Home Evaluation for COPD & MDI Assessment order using Per Protocol order mode.     Electronically signed by Eleanor Leslie RCP on 3/9/2023 at 9:39 PM

## 2023-03-10 NOTE — CONSULTS
In patient Neurology consult        [facility]      Yovani Patel, [address]  1970    Date of Service: 3/10/2023    Referring Physician: Zbigniew Puga MD      Reason for the consult and CC: Acute confusion, dizziness and syncope    HPI:   The patient is a 47y.o.  years old female multimedical problems who was admitted to the hospital yesterday with above concerns. Symptoms started on the day of admission. She is been having intermittent symptoms of dizziness at home followed by brief LOC for few minutes. No witnessed convulsion or postictal state. Degree was severe. Similar incident several years ago. No other relieving or aggravating factors but she has chronic dizziness upon standing. She has multimedical problems with chronic respiratory failure  History of breast cancer and hypertension. Patient also upon awakening, felt slightly numb in her left side for few minutes. No chest pain, dysphagia or dysarthria. Speech impairment. She was admitted to the hospital.  Further imaging with CT showed no acute stroke. MRI showed remote right pontine stroke. MRA showed no large vessel occlusion. Today she denies any other new symptoms. Other review of system was unremarkable. Constitutional:   Vitals:    03/10/23 0400 03/10/23 0730 03/10/23 0813 03/10/23 1100   BP: 114/62 115/68  107/68   Pulse: 73 79  68   Resp: 17 17  16   Temp: 97.6 °F (36.4 °C) 97.9 °F (36.6 °C)  98.1 °F (36.7 °C)   TempSrc: Axillary Oral  Oral   SpO2: 93% 96%  92%   Weight:   203 lb 11.2 oz (92.4 kg)    Height:             I personally reviewed and updated social history, past medical history, medications, allergy, surgical history, and family history as documented in the patient's electronic health records. ROS: 10-14 ROS reviewed with the patient/nurse/family which were unremarkable except mentioned in H&P. General appearance:  Normal development and appear in no acute distress. Mental Status:   Oriented to person, place, problem, and time. Memory: Good immediate recall. Intact remote memory  Normal attention span and concentration. Language: intact naming, repeating and fluency   Good fund of Knowledge. Aware of current events and vocabulary   Cranial Nerves: Chronic left upper extremity edema  II: Visual fields: Full. Pupils: equal, round, reactive to light, bilaterally  III,IV,VI: Extra Ocular Movements are intact. No nystagmus  V: Facial sensation is intact  VII: Facial strength and movements: intact and symmetric  IX: Normal palatal elevation and shoulder shrug  XII: Tongue movements are normal  Musculoskeletal: 5/5 in all 4 extremities. Good range of motion. No muscle atrophy. Tone: Normal tone. Reflexes: Symmetric 2+ in the arms and 1+ in the legs   Planters: Flexor bilaterally  Coordination: no pronator drift, no dysmetria with FNF and normal REM  Sensation: normal in both arms and legs.   Gait/Posture: NT        Medical decision making:  Data: reviewed   LABS:   Lab Results   Component Value Date/Time     03/09/2023 03:44 PM    K 4.6 03/09/2023 03:44 PM    K 4.4 10/29/2020 04:29 AM    CL 84 03/09/2023 03:44 PM    CO2 32 03/09/2023 03:44 PM    BUN 68 03/09/2023 03:44 PM    CREATININE 2.3 03/09/2023 03:44 PM    GFRAA 47 10/13/2022 02:20 PM    GFRAA >60 06/19/2011 12:17 AM    LABGLOM 25 03/09/2023 03:44 PM    GLUCOSE 192 03/09/2023 03:44 PM    GLUCOSE 313 05/16/2017 03:47 PM    PHOS 3.4 02/07/2019 12:25 PM    MG 2.30 03/09/2023 03:44 PM    CALCIUM 9.1 03/09/2023 03:44 PM     Lab Results   Component Value Date/Time    WBC 8.2 03/10/2023 02:00 AM    RBC 3.74 03/10/2023 02:00 AM    RBC 3.57 05/16/2017 03:47 PM    HGB 10.7 03/10/2023 02:00 AM    HCT 32.4 03/10/2023 02:00 AM    MCV 86.7 03/10/2023 02:00 AM    RDW 13.5 03/10/2023 02:00 AM     03/10/2023 02:00 AM     Lab Results   Component Value Date    INR 0.94 03/09/2023    PROTIME 12.5 03/09/2023 Neuroimaging was independently reviewed by myself and discussed results with the patient and her   Reviewed notes from different physicians including H&P and ED notes. Reviewed lab and blood testing    Impression:  Recent fall with dizziness and syncope. Could be mechanical fall or related to hypoperfusion from hypotension, dehydration. Less likely vascular focal seizure. Hyponatremia  Recurrent dizziness  Hypertension  History of breast cancer  Lymphedema  Acute on chronic kidney disease  Diabetes with underlying polyneuropathy    Recommendation:  Lengthy discussion with the patient and  regarding possible etiology for her tiredness, fatigue and recurrent falling at home. Continue current supportive care  Follow troponin  Hydration follow kidney function test  Nephrology was consulted  Stroke education and stroke prevention discussed  Aspirin and statin  Blood sugar monitor  Insulin sliding scale  Advised her  to monitor blood pressure closely at home  Continue blood pressure monitor blood pressure control  Patient be consistent with home O2  DC planning when medically stable  We will follow as needed      Thank you for referring such patient. If you have any questions regarding my consult note, please don't hesitate to call me. Yovani Patel MD  [phone]    This dictation was generated by voice recognition computer software.  Although all attempts are made to edit the dictation for accuracy, there may be errors in the  transcription that are not intended

## 2023-03-10 NOTE — CARE COORDINATION
Meghan Knapp with [facility] [phone] is reviewing to see if they can take. CM left vm for Dash Vázquez with [facility] [phone] pt information to see if he can accept and CM office number weekend. If pt denied ARU  would prefer to take home with home care. He does have snf list for choices.      Oli Prieto RN, BSN  [phone]

## 2023-03-10 NOTE — PLAN OF CARE
Pt tolerated straight catheterization and had 1300 mL output. Purewick initiated. Pt understands teaching related to [address] and urinary retention. Pt is not able to sit at side of bed, pt is not able to stand for sarastedy. Pt did have throbbing headache that was relieved after evening medications. Pt has requested only female nurses and requested that individuals speak with nurse prior to entering room. Pt has spiritual & cultural belief related to food preferences (no pork products) and modesty. See flowsheets. Pt has slept soundly through night, arousable with tactile stimulation.  remains at bedside. Evening pain medication not given. VSS; standard safety precautions in place. Addendum @ 0745: RN hand-off complete. Pt has not had urine output. Pt stated that she has very little urine when she goes to the bathroom at home. Pt understands care & treatment plan, including bladder scan. Pt alert & orientated;  at bedside; pt weak. Problem: Discharge Planning  Goal: Discharge to home or other facility with appropriate resources  Outcome: Progressing     Problem: Pain  Goal: Verbalizes/displays adequate comfort level or baseline comfort level  Outcome: Progressing     Problem: Skin/Tissue Integrity  Goal: Absence of new skin breakdown  Description: 1. Monitor for areas of redness and/or skin breakdown  2. Assess vascular access sites hourly  3. Every 4-6 hours minimum:  Change oxygen saturation probe site  4. Every 4-6 hours:  If on nasal continuous positive airway pressure, respiratory therapy assess nares and determine need for appliance change or resting period.   Outcome: Progressing     Problem: Safety - Adult  Goal: Free from fall injury  Outcome: Progressing     Problem: ABCDS Injury Assessment  Goal: Absence of physical injury  Outcome: Progressing     Problem: Respiratory - Adult  Goal: Achieves optimal ventilation and oxygenation  Outcome: Progressing Problem: Cardiovascular - Adult  Goal: Maintains optimal cardiac output and hemodynamic stability  Outcome: Progressing     Problem: Skin/Tissue Integrity - Adult  Goal: Skin integrity remains intact  Outcome: Progressing  Goal: Oral mucous membranes remain intact  Outcome: Progressing     Problem: Musculoskeletal - Adult  Goal: Return mobility to safest level of function  Outcome: Progressing     Problem: Gastrointestinal - Adult  Goal: Minimal or absence of nausea and vomiting  Outcome: Progressing     Problem: Genitourinary - Adult  Goal: Absence of urinary retention  Outcome: Progressing     Problem: Metabolic/Fluid and Electrolytes - Adult  Goal: Electrolytes maintained within normal limits  Outcome: Progressing  Goal: Hemodynamic stability and optimal renal function maintained  Outcome: Progressing  Goal: Glucose maintained within prescribed range  Outcome: Progressing     Problem: Hematologic - Adult  Goal: Maintains hematologic stability  Outcome: Progressing

## 2023-03-10 NOTE — PROGRESS NOTES
Facility/Department: [address]  Speech Language Pathology  DYSPHAGIA BEDSIDE SWALLOW EVALUATION     Patient: Meme Shields   : 1970   MRN: [id number]      Evaluation Date: 3/10/2023   Admitting Diagnosis: Slurred speech [R47.81]  Hyponatremia [E87.1]  Left-sided weakness [R53.1]  Elevated troponin [R77.8]  Closed head injury, initial encounter [S09.90XA]  Acute CVA (cerebrovascular accident) ([facility] [I63.9]  Acute renal failure superimposed on chronic kidney disease, unspecified CKD stage, unspecified acute renal failure type ([facility] [N17.9, N18.9]  Pain: Did not state                                                       H&P: 47 y.o. female who presents to the emergency department stating that she has been feeling intermittently dizzy and shaky for several days. Yesterday evening, she went to the kitchen to make her cell something to eat and forgot to wear her oxygen.  states that sometimes when she forgets to wear her oxygen, she gets increasingly lightheaded and shaky. She pressed on the bottom of the trash can to open the lid and became dizzy and fell over and struck her head on the ground. There was no loss of consciousness. Patient yelled out for her  in the next room. He helped her up and states that since then she has been feeling extremely dizzy, weak, shaky. Her left arm and leg are weaker than her right since she hit her head. In review of her chart, she does have history of TIA with left-sided weakness. She wears 3 L of oxygen at all time. She has history of breast cancer in remission. She has chronic lymphedema in the left upper extremity and right lower extremity. She is able to speak some [place] but  does help with translation and secondary to her [employer]ism, she prefers not to use an .      Imaging:  Chest X-ray:  3/9/23  Impression   Pulmonary vascular congestion along with increased interstitial opacity,   suggesting interstitial edema.         MRI: 3/9/23  Impression   No acute intracranial abnormality. Old lacunar infarct in the right central miri. Mild parenchymal volume loss. Minimal chronic microvascular disease. Motion artifact. Otherwise, unremarkable noncontrast MRA of the head. Motion artifact. No eyes, unremarkable noncontrast MRA of the neck. Prior Modified Barium Swallow Study:  Modified Barium Swallow evaluation completed on 5/11/2011. Results indicate moderate oropharyngeal dysphagia, with intermittent Silent laryngeal penetration noted with thins. Penetration appears to be non-building, with no aspiration directly viewed. However, consistency of laryngeal penetration is indicative of delayed sensory/motor response, and reduced airway protection during the swallow. Therefore, precautions should be followed to minimize aspiration risk with thins. Pt was also noted with reduced oropharyngeal sensation for timely palatal retraction for bolus transfer to pharynx, and for timely swallow initiation with textures > thin liquids. This results in delayed and \"effortful\"solid bolus transfer to pharynx (noted with solids and with pills with water), and pharyngeal pooling with delayed swallow initiation (4-6 sec, with puree, soft solids and solids). These deficits are consistent with neuro dysfunction of unknown etiology. Although no laryngeal penetration or aspiration was noted with increased textures, these deficits may contribute to Pt's reported sensation of food \"stuck in her throat\". Pt was also noted with prominent tongue base at the level of the lingual tonsils, which appears to partially occlude the vallecular space during the swallow. This may contribute to noted laryngeal penetration with thins. Pt also reported intermittent \"pain\" during the swallow throughout the study.  Changes in Pt's vocal quality, with increased vocal weakness and hoarseness were also noted at the end of this study. Therefore, an ENT consult to rule out pathology, and to view vocal cord motility, is recommended. Mildly delayed esophageal drain (at upper esophagus) was noted with increased textures. No overt reflux was noted, but Barium Swallow Study to view entire esophagus may be indicated. Pt did report sensation of food \"sticking\", which correlated with mild delayed esophageal clearing at the level of the upper esophagus with textures >thins. History/Prior Level of Function:   Living Status: Home   Prior Dysphagia History: Per chart review, pt seen in 2011 with completion of a MBS and recommendations for a \"soft\" regular diet with thins/no straws, meds in puree and avoid mixed consistencies. Reason for referral: SLP evaluation orders received due to pt/family reports of trouble swallowing     Dysphagia Impressions/Diagnosis: Oropharyngeal Dysphagia   Pt alert and upright in bed, with  present during evaluation. Pt currently on 2L O2 via nasal cannula. Pt reported difficulty with swallowing rice and bread textures. Oral motor exam was within functional limits. Various textures were provided to assess swallow function. Oral phase characterized by prolonged mastication, decreased AP transit and delayed oral clearing. Pt required use of a liquid wash for clearance of diffuse stasis from regular solids. No overt clinical s/s of aspiration noted across trials. Pt reported slight decline in swallow function suspect in relation to overall deconditioning. Recommend downgrade to Dysphagia III/Soft and Bite-Sized with Thin Liquids, meds whole with water and ongoing dysphagia intervention to ensure tolerance. Recommended Diet and Intervention 3/10/2023:  Diet Solids Recommendation:  Dysphagia III Soft and bite sized  Liquid Consistency Recommendation:   Thin liquids  Recommended form of Meds: Meds whole with water        Compensatory Swallowing Strategies:  Upright as possible with all PO intake , Small bites/sips , Eat/feed slowly, Remain upright 30-45 min     SHORT TERM DYSPHAGIA GOALS/PLAN OF CARE: Speech therapy for dysphagia tx 3-5 times per week during acute care stay.     Pt will functionally tolerate recommended diet with no overt clinical s/s of aspiration   Pt will demonstrate understanding of aspiration risk and precautions via education/demonstration with occasional prompting  Pt will advance to least restrictive diet as indicated   If clinical s/s of aspiration/penetration continue to be noted, Pt will participate in Modified Barium Swallow Study     Dysphagia Therapeutic Intervention:  Diet Tolerance Monitoring , Patient/Family Education , Therapeutic Trials with SLP     Discharge Recommendations: Recommend ongoing SLP for dysphagia therapy upon discharge from hospital     Patient Positioning: Upright in bed     Current Diet Level (prior to evaluation): Regular texture diet  Thin liquids      Respiratory Status:   []Room Air   [x]O2 via nasal cannula: 2L\   []Other:    Dentition:  [x]Adequate  []Dentures   []Missing Many Teeth  []Edentulous  []Other:    Baseline Vocal Quality:  [x]Normal  []Dysphonic   []Aphonic   []Hoarse  []Wet  []Weak  []Other:    Volitional Cough:  Not Elicited     Volitional Swallow:   []Absent   []Delayed     [x]Adequate     []Required use of drink     Oral Mechanism Exam:  [x]WFL []Mild   [] Moderate  []Severe  []To be assessed  Impaired:   []Left side      []Right side    []Labial ROM/Coordination    []Labial Symmetry   []Lingual ROM/Coordination   []Lingual Symmetry  []Gag  []Other:     Oral Phase: []WFL [x]Mild   [x] Moderate  []Severe  []To be assessed   [x]Impaired/Prolonged Mastication:   []Oral Holding:   []Spillage Left:   []Spillage Right:  []Pocketing Left:   []Pocketing Right:   [x]Decreased Anterior to Posterior Transit:   [x]Suspected Premature Bolus Loss:   [x]Lingual/Palatal Residue:   []Other:     Pharyngeal Phase: []WFL [x]Mild   [] Moderate  []Severe  []To be assessed   [x]Delayed Swallow:   []Suspected Pharyngeal Pooling:   []Decreased Laryngeal Elevation:   []Absent Swallow:  []Wet Vocal Quality:   []Throat Clearing-Immediate:   []Throat Clearing-Delayed:   []Cough-Immediate:   []Cough-Delayed:  []Change in Vital Signs:  []Suspected Delayed Pharyngeal Clearing:  []Other:     Eating Assistance:  []Independent  [x]Setup or clean-up assistance   [] Supervision or touching assistance   [] Partial or moderate assistance   [] Substantial or maximal assistance  [] Dependent     EDUCATION:   Provided education regarding role of SLP, results of assessment, recommendations and general speech pathology plan of care. [x] Pt verbalized understanding and agreement   [x] Pt requires ongoing learning   [] No evidence of comprehension     If patient discharges prior to next visit, this note will serve as discharge.      Treatment time:  Timed Code Treatment Minutes: 0  Total Treatment time: 26 min    Electronically signed by:    Niecy Dumont M.A., [address]  Speech-Language Pathologist

## 2023-03-10 NOTE — PROGRESS NOTES
[facility] [address]   Phone: [phone]    Occupational Therapy    [x] Initial Evaluation            [] Daily Treatment Note         [] Discharge Summary      Patient: Nieves Rivero   : 1970   MRN: [id number]   Date of Service:  3/10/2023    Admitting Diagnosis:  Acute CVA (cerebrovascular accident) [facility])  Current Admission Summary: 47 y.o. female who presents to the emergency department stating that she has been feeling intermittently dizzy and shaky for several days and had a fall yesterday. In review of her chart, she does have history of TIA with left-sided weakness. She wears 3 L of oxygen at all time. She has chronic lymphedema in the left upper extremity and right lower extremity. She is able to speak some [place] but  does help with translation and secondary to her Restoration[employer], she prefers not to use an . CT scan did show possible lacunar infarct MRI does not show acuity. Patient did had a urinary retention Carmona was placed  Past Medical History:  has a past medical history of Anxiety, Anxiety and depression, Arthritis, Asthma, CAD (coronary artery disease), Cancer ([address] Cerebral artery occlusion with cerebral infarction ([address] CHF (congestive heart failure) ([address] Chronic kidney disease, Chronic pain, Constipation, COPD (chronic obstructive pulmonary disease) ([address] Depression, Diabetes mellitus ([address] Diabetic polyneuropathy associated with type 2 diabetes mellitus ([address] Dysthymia, ESBL (extended spectrum beta-lactamase) producing bacteria infection, Fibromyalgia, Gastric ulcer, unspecified as acute or chronic, without mention of hemorrhage, perforation, or obstruction, GERD (gastroesophageal reflux disease), Gout, HIGH CHOLESTEROL, Hypertension, Hypothyroidism, Pneumonia, Pyelonephritis, Severe persistent asthma without complication, Thyroid disease, and TIA (transient ischemic attack).   Past Surgical History:  has a past surgical history that includes Breast surgery; Hysterectomy (2005); Carpal tunnel release; Tonsillectomy; Finger contracture surgery; Upper gastrointestinal endoscopy (2019); and Mastectomy. Discharge Recommendations: Juan Carrillo scored a 13/24 on the AM-PAC ADL Inpatient form. Current research shows that an AM-PAC score of 17 or less is typically not associated with a discharge to the patient's home setting. Based on the patient's AM-PAC score and their current ADL deficits, it is recommended that the patient have 5-7 sessions per week of Occupational Therapy at d/c to increase the patient's independence. At this time, this patient demonstrates complex nursing, medical, and rehabilitative needs, and would benefit from intensive rehabilitation services upon discharge from the Inpatient setting. This patient demonstrates the ability to participate in and benefit from an intensive therapy program with a coordinated interdisciplinary team approach to foster frequent, structured, and documented communication among disciplines, who will work together to establish, prioritize, and achieve treatment goals. Please see assessment section for further patient specific details. If patient discharges prior to next session this note will serve as a discharge summary. Please see below for the latest assessment towards goals.       DME Required For Discharge: DME to be determined at next level of care    Precautions/Restrictions: high fall risk  Weight Bearing Restrictions: no restrictions  [] Right Upper Extremity  [] Left Upper Extremity [] Right Lower Extremity  [] Left Lower Extremity     Required Braces/Orthotics: no braces required   [] Right  [] Left  Positional Restrictions:no positional restrictions    Pre-Admission Information   Lives With: spouse, son    Type of Home: house  Home Layout: one level  Home Access: level entry  Bathroom Layout: tub/shower unit, walk in shower, pt uses tub/shower  Bathroom Equipment: grab bars in shower  Toilet Height: standard height  Home Equipment: single point cane  Transfer Assistance: Independent without use of device  Ambulation Assistance:Independent without use of device  ADL Assistance: requires assistance with bathing, IND with dressing, toileting, grooming  IADL Assistance: independent with homemaking tasks, family assists as well  Active :        [] Yes  [x] No  Hand Dominance: [] Left  [x] Right  Recent Falls: pt reports 2 recent falls in last 30 days    Examination   Vision:   Vision Gross Assessment: Impaired and Vision Corrective Device: wears glasses for distance  Hearing:   WFL  Observation:   General Observation:  Carmona in place, on 3L via NC; significant edema to L UE, moderate edema to R LE- h/o lymphedema to LUE and R LE  Posture:   good  Sensation:   reports numbness and tingling in all extremities- chronic  Tone:   Normotonic  Coordination Testing:   Impaired L UE d/t edema; pt with myoclonic jerking noted with fatigue.   ROM:   (L) Shoulder: ~90     (R) Shoulder: grossly WFL      Therapist Clinical Decision Making (Complexity): medium complexity  Clinical Presentation: evolving      Subjective  General: patient supine in bed on arrival, agreeable to OT/PT evaluation. Pt Khmer speaking and able to understand some English,  present to assist/interpret as needed- pt declines use of hospital interpretor  Pain: 9/10.  Location: B hips R>L  Pain Interventions: RN notified and repositioned         Activities of Daily Living  Basic Activities of Daily Living  General Comments: declines, Carmona in place  Instrumental Activities of Daily Living  No IADL completed on this date.    Functional Mobility  Bed Mobility  Supine to Sit: maximum assistance  Scooting: minimal assistance  Comments: HOB elevated and Max A to transfer to sitting. Pt pulls on therapists hands to scoot EOB  Transfers  Sit to stand transfer:minimal assistance  Stand to sit transfer: moderate  assistance  Stand step transfer: moderate assistance  Comments: mildly impulsive with STS transfers, completes STS from EOB to no device min A; pivots to recliner with mod A x2, attempting to prematurely sit in recliner. Pt reports increased dizziness and headache with transfer, /75, SpO2 94% HR 79  Functional Mobility:  Sitting Balance: contact guard assistance, seated EOB, posterior lean noted on initial sitting up. No functional mobility completed on this date secondary to safety concerns/fatigue. Other Therapeutic Interventions    Functional Outcomes  AM-PAC Inpatient Daily Activity Raw Score: 13    Cognition  Overall Cognitive Status: WFL  Following Commands: follows one step commands consistently  Safety Judgement: decreased awareness of need for assistance  Problem Solving: decreased awareness of errors, assistance required to identify errors made, assistance required to correct errors made  Insights: decreased awareness of deficits  Orientation:    alert and oriented x 4  Command Following:   accurately follows one step commands     Education  Barriers To Learning: cultural  Patient Education: patient educated on OT role and benefits, plan of care, transfer training, discharge recommendations  Learning Assessment:  patient verbalizes understanding, would benefit from continued reinforcement    Assessment  Activity Tolerance: fair, limited by decreased activity tolerance  Impairments Requiring Therapeutic Intervention: decreased functional mobility, decreased ADL status, decreased ROM, decreased strength, decreased safety awareness, decreased endurance, decreased balance, decreased IADL, decreased fine motor control  Prognosis: fair  Clinical Assessment: patient presents with above deficits and is below baseline, now requiring Min-mod A for functional transfers,pt unable to tolerate functional mobility this session.  Pt would benefit from continued OT services to facilitate [facility]  Safety Interventions: patient left in chair, chair alarm in place, call light within reach, nurse notified, and family/caregiver present    Plan  Frequency: 3-5 x/per week  Current Treatment Recommendations: strengthening, balance training, functional mobility training, transfer training, endurance training, patient/caregiver education, ADL/self-care training, and equipment evaluation/education    Goals  Patient Goals: to return home   Short Term Goals:  Time Frame: discharge  Patient will complete lower body ADL at minimal assistance   Patient will complete toileting at minimal assistance   Patient will complete functional transfers at stand by assistance   Patient will complete functional mobility at stand by assistance     Therapy Session Time     Individual Group Co-treatment   Time In    1320   Time Out    1358   Minutes    38        Timed Code Treatment Minutes:   23  Total Treatment Minutes:  38       Electronically Signed By: Iván Pelletier OTR/L [id number]

## 2023-03-10 NOTE — CONSULTS
Office : [phone]     Fax :[phone]       Nephrology Consult Note      Patient's Name: Alison Jenkins  9:00 AM  3/10/2023    Reason for Consult: MARIA D      Requesting Physician:  Karlos Su DO      Chief Complaint:    Chief Complaint   Patient presents with    Fall     Fall yesterday at 8:00 pm with strike to posterior head, now c/o left arm numbness and left leg weakness.  states fall due to dizziness. History of Present Ilness:    Alison Jenkins is a 47 y.o. female with prior history of breast cancer , CVA, CAD who was admitted with c/o dizziness.  Initial lab work showed BUN of 68 and creat of 2.3. baseline creatinine is 1.4     Sodium is low at 128   Chloride level low at 84           I/O last 3 completed shifts:  In: -   Out: 1481 [Urine:1481]    Past Medical History:   Diagnosis Date    Anxiety     Anxiety and depression     Arthritis     not sure of specific type    Asthma     CAD (coronary artery disease)     Cancer ([address] 2007    left breast    Cerebral artery occlusion with cerebral infarction ([address]     2000, 2001    CHF (congestive heart failure) ([address] 2018    Chronic kidney disease     renal insufficency/to see Dr Danita Kayser    Chronic pain     Constipation     COPD (chronic obstructive pulmonary disease) ([address]     Depression     Diabetes mellitus ([address]     Diabetic polyneuropathy associated with type 2 diabetes mellitus ([address] 2/2/2018    Dysthymia 2/2/2018    ESBL (extended spectrum beta-lactamase) producing bacteria infection 03/14/2018    urine    Fibromyalgia     Gastric ulcer, unspecified as acute or chronic, without mention of hemorrhage, perforation, or obstruction     GERD (gastroesophageal reflux disease)     Gout     HIGH CHOLESTEROL     Hypertension     Hypothyroidism     Pneumonia 3/25/2020    Pyelonephritis     Severe persistent asthma without complication 9735    Thyroid disease     TIA (transient ischemic attack)     with occasional left leg and hand weakness       Past Surgical History:   Procedure Laterality Date    BREAST SURGERY      left mastectomy    CARPAL TUNNEL RELEASE      Bilateral    FINGER CONTRACTURE SURGERY      HYSTERECTOMY (CERVIX STATUS UNKNOWN)  2005    USO    MASTECTOMY      TONSILLECTOMY      UPPER GASTROINTESTINAL ENDOSCOPY  2019    stretched esophagous        Family History   Problem Relation Age of Onset    Asthma Other     Cancer Other     Depression Other     Diabetes Other     Hypertension Other     High Cholesterol Other     Migraines Other     Heart Attack Father 72         of MI    High Blood Pressure Mother     Diabetes Mother         reports that she has never smoked. She has never used smokeless tobacco. She reports that she does not drink alcohol and does not use drugs. Allergies:   Iv dye [iodides], Diazepam, Insulin glargine, and Trazodone and nefazodone    Current Medications:    aspirin EC tablet 81 mg, Daily  benztropine (COGENTIN) tablet 1 mg, Nightly  butalbital-acetaminophen-caffeine (FIORICET, ESGIC) per tablet 1 tablet, Q6H PRN  diazePAM (VALIUM) tablet 5 mg, BID PRN  DULoxetine (CYMBALTA) extended release capsule 60 mg, BID  leflunomide (ARAVA) tablet 20 mg, Daily  levothyroxine (SYNTHROID) tablet 150 mcg, Daily  metoprolol tartrate (LOPRESSOR) tablet 25 mg, BID  montelukast (SINGULAIR) tablet 10 mg, Nightly  nitroGLYCERIN (NITROSTAT) SL tablet 0.4 mg, Q5 Min PRN  insulin lispro (HUMALOG) injection vial 0-16 Units, TID WC  insulin lispro (HUMALOG) injection vial 0-4 Units, Nightly  traZODone (DESYREL) tablet 50 mg, Nightly  ranolazine (RANEXA) extended release tablet 500 mg, BID  ondansetron (ZOFRAN-ODT) disintegrating tablet 4 mg, Q8H PRN   Or  ondansetron (ZOFRAN) injection 4 mg, Q6H PRN  polyethylene glycol (GLYCOLAX) packet 17 g, Daily PRN  perflutren lipid microspheres (DEFINITY) injection 1.5 mL, ONCE PRN  0.9 % sodium chloride infusion, Continuous  acetaminophen (TYLENOL) tablet 650 mg, Q4H PRN   Or  acetaminophen (TYLENOL) suppository 650 mg, Q4H PRN  atorvastatin (LIPITOR) tablet 80 mg, Nightly  labetalol (NORMODYNE;TRANDATE) injection 10 mg, Q10 Min PRN  clopidogrel (PLAVIX) tablet 75 mg, Daily  heparin (porcine) injection 5,000 Units, BID  dextrose bolus 10% 125 mL, PRN   Or  dextrose bolus 10% 250 mL, PRN  glucagon (rDNA) injection 1 mg, PRN  dextrose 10 % infusion, Continuous PRN  albuterol (PROVENTIL) nebulizer solution 2.5 mg, BID  albuterol (PROVENTIL) nebulizer solution 2.5 mg, Q4H PRN  oxyCODONE (OXYCONTIN) extended release tablet 10 mg, 2 times per day  meclizine (ANTIVERT) tablet 12.5 mg, TID PRN  oxyCODONE (ROXICODONE) immediate release tablet 5 mg, Q6H PRN        Review of Systems:   14 point ROS obtained but were negative except mentioned in HPI      Physical exam:     Vitals:  /68   Pulse 79   Temp 97.9 °F (36.6 °C) (Oral)   Resp 17   Ht 5' 4\" (1.626 m)   Wt 203 lb 11.2 oz (92.4 kg)   SpO2 96%   BMI 34.97 kg/m²   Constitutional:  OAA X3 NAD  Skin: no rash, turgor wnl  Heent:  eomi, mmm  Neck: no bruits or jvd noted  Cardiovascular:  S1, S2 without m/r/g  Respiratory: CTA B without w/r/r  Abdomen:  +bs, soft, nt, nd  Ext: has edema Left arm and hand  Psychiatric: mood and affect appropriate  Musculoskeletal:  Rom, muscular strength intact    Labs:  CBC:   Recent Labs     03/09/23  1544 03/10/23  0200   WBC 10.7 8.2   HGB 11.6* 10.7*    267     BMP:    Recent Labs     03/09/23  1544   *   K 4.6   CL 84*   CO2 32   BUN 68*   CREATININE 2.3*   GLUCOSE 192*     Ca/Mg/Phos:   Recent Labs     03/09/23  1544   CALCIUM 9.1   MG 2.30     Hepatic:   Recent Labs     03/09/23  1544   AST 84*   ALT 22   BILITOT <0.2   ALKPHOS 106 Troponin:   Recent Labs     03/09/23  1544 03/09/23  2230 03/10/23  0200   TROPONINI 0.03* 0.02* 0.02*     BNP: No results for input(s): BNP in the last 72 hours. Lipids: No results for input(s): CHOL, TRIG, HDL, LDLCALC, LABVLDL in the last 72 hours. ABGs: No results for input(s): PHART, PO2ART, HNY3NVH in the last 72 hours. INR:   Recent Labs     03/09/23  1544   INR 0.94     UA:  Recent Labs     03/09/23  1544   COLORU Yellow   CLARITYU Clear   GLUCOSEU 500*   BILIRUBINUR Negative   KETUA Negative   SPECGRAV 1.010   BLOODU Negative   PHUR 7.0   PROTEINU Negative   UROBILINOGEN 0.2   NITRU Negative   LEUKOCYTESUR Negative   LABMICR Not Indicated   URINETYPE NotGiven      Urine Microscopic: No results for input(s): LABCAST, BACTERIA, COMU, HYALCAST, WBCUA, RBCUA, EPIU in the last 72 hours. Urine Culture: No results for input(s): LABURIN in the last 72 hours. Urine Chemistry: No results for input(s): Deniz Holden, PROTEINUR, NAUR in the last 72 hours. IMAGING:  MRA NECK WO CONTRAST   Final Result   No acute intracranial abnormality. Old lacunar infarct in the right central miri. Mild parenchymal volume loss. Minimal chronic microvascular disease. Motion artifact. Otherwise, unremarkable noncontrast MRA of the head. Motion artifact. No eyes, unremarkable noncontrast MRA of the neck. MRA HEAD WO CONTRAST   Final Result   No acute intracranial abnormality. Old lacunar infarct in the right central miri. Mild parenchymal volume loss. Minimal chronic microvascular disease. Motion artifact. Otherwise, unremarkable noncontrast MRA of the head. Motion artifact. No eyes, unremarkable noncontrast MRA of the neck. MRI BRAIN WO CONTRAST MR WITNESS   Final Result   No acute intracranial abnormality. Old lacunar infarct in the right central miri. Mild parenchymal volume loss. Minimal chronic microvascular disease.       Motion artifact. Otherwise, unremarkable noncontrast MRA of the head. Motion artifact. No eyes, unremarkable noncontrast MRA of the neck. XR CHEST PORTABLE   Final Result   Pulmonary vascular congestion along with increased interstitial opacity,   suggesting interstitial edema. CT HEAD WO CONTRAST   Final Result   Small suspected lacunar infarct within the right paramedian miri. No large territory acute cortical infarct, hemorrhage or intracranial mass   effect. The findings were sent to the Radiology Results [address] at 2:56   pm on 3/9/2023 to be communicated to a licensed caregiver, received by   ALBERTO Lou at 2:59 pm.             Prior to Admission medications    Medication Sig Start Date End Date Taking? Authorizing Provider   spironolactone (ALDACTONE) 50 MG tablet TAKE 2 TABLETS BY MOUTH IN THE MORNING and 1 tablet in the evening 3/6/23   Gabby Bell MD   levothyroxine (SYNTHROID) 150 MCG tablet TAKE 1 TABLET BY MOUTH IN THE MORNING before breakfast 2/23/23   Gregory Sage MD   torsemide (DEMADEX) 100 MG tablet TAKE 1 TABLET BY MOUTH IN THE MORNING AND 1/2 TABLET IN THE EVENING.   Patient taking differently: 100 mg in the morning and at bedtime 100 mg bid - new - updated 2 months ago per pt &  on 3/9/23 2/21/23   HERMES Cooley - CNP   leflunomide (ARAVA) 20 MG tablet TAKE 1 TABLET BY MOUTH EVERY DAY 2/16/23   David Frederick MD   simvastatin (ZOCOR) 20 MG tablet TAKE 1 TABLET BY MOUTH one time daily at night 2/2/23   HERMES Cooley CNP   vitamin D3 (CHOLECALCIFEROL) 25 MCG (1000 UT) TABS tablet TAKE 3 TABLETS BY MOUTH ONE TIME A DAY 2/2/23   Gregory Sage MD   lubiprostone (AMITIZA) 24 MCG capsule TAKE 1 CAPSULE BY MOUTH TWO TIMES A DAY WITH MEALS 2/2/23   Reyna Ny DO   ferrous sulfate (FEROSUL) 325 (65 Fe) MG tablet TAKE 1 TABLET BY MOUTH TWO TIMES A DAY WITH MEALS 1/19/23   Reyna Ny,    mineral oil-hydrophilic petrolatum (HYDROPHOR) ointment Apply topically 4 times daily as needed to hands and feet. 23   Phoebe Balalyce, DO   Compression Sleeves Left arm 23   Phoebe Balk, DO   Elastic Bandages & Supports (COMPRESSION & SUPPORT GLOVES L) MISC 1 each by Does not apply route daily Left hand 23   Phoebe Balk, DO   Elastic Bandages & Supports (MEDICAL COMPRESSION STOCKINGS) [facility] 2 each by Does not apply route daily On am/off HS. 23   Phoebe Bradshaw, DO   Urea 40 % LOTN Apply to feet nightly and cover with Aquaphor 23   Phoebe Bradshaw, DO   sulfaSALAzine (AZULFIDINE) 500 MG tablet TAKE 1 TABLET BY MOUTH TWO TIMES A DAY 12/15/22   Gayathri Montalvo MD   omeprazole (PRILOSEC) 20 MG delayed release capsule TAKE 1 CAPSULE BY MOUTH TWO TIMES A DAY 10/26/22   Phoebe Bradshaw DO   nitroGLYCERIN (NITROSTAT) 0.4 MG SL tablet PLACE ONE TABLET UNDER TONGUE AS NEEDED FOR CHEST PAIN, MAY REPEAT EVERY 5 MINUTES AS NEEDED UP TO A MAX OF 3 TABLETS. IF NO RELIEF AFTER 1 DOSE, CALL 911. 10/3/22   Eduardo Francois MD   metoprolol tartrate (LOPRESSOR) 25 MG tablet TAKE 1 TABLET BY MOUTH TWO TIMES A DAY 22   Eduardo Francois MD   Febuxostat 80 MG TABS TAKE 1 TABLET BY MOUTH EVERY DAY 22   Gayathri Montalvo MD   pregabalin (LYRICA) 75 MG capsule TAKE 1 CAPSULE BY MOUTH TWO TIMES A DAY  Patient taking differently: 75 mg.  TAKE 1 CAPSULE BY MOUTH TWO TIMES A DAY    Took this morning on 3/9/23 - pt &  state not  - refilled by MD last week 9/14/22 3/1/23  Gayathri Montalvo MD   SM SENNA-S 8.6-50 MG per tablet TAKE 2 TABLETS BY MOUTH TWO TIMES A DAY 22   Phoebe Bradshaw DO   solifenacin (VESICARE) 10 MG tablet Take 1 tablet by mouth daily 22  Phoebe Bradshaw DO   meclizine (ANTIVERT) 25 MG tablet Take 1 tablet by mouth 3 times daily as needed for Dizziness 22   Phoebe Bradshaw DO   montelukast (SINGULAIR) 10 MG tablet Take 1 tablet by mouth nightly 22   MD KAY Wren LAC [facility] ACUTE PSYCH UNIT FLEXTOUCH 200 UNIT/ML SOPN INJECT 200 UNITS SUBCUTANEOUSLY ONE TIME DAILY 8/12/22   Jason Mukherjee MD   Continuous Blood Gluc Sensor (FREESTYLE EMERALD 2 SENSOR) MISC Change every 14 days 7/27/22   Jason Mukherjee MD   insulin aspart (NOVOLOG FLEXPEN) 100 UNIT/ML injection pen inject 30 to 50 units into the skin three time daily before meals 7/27/22   Jason Mukherjee MD   Insulin Pen Needle (B-D UF III MINI PEN NEEDLES) 31G X 5 MM MISC use five times daily with insulin 7/27/22   Jason Mukherjee MD   STEGLATRO 5 MG TABS TAKE 1 TABLET BY MOUTH EVERY DAY 7/13/22   Jason Mukherjee MD   Blood Glucose Monitoring Suppl (Hina Wilcox) w/Device KIT Use to check glucose 4 times daily. 7/12/22   Jason Mukherjee MD   Lidocaine 5 % CREA Apply to feet QID prn pain for peripheral neuropathy 6/21/22   Emily Ibarra DO   butalbital-acetaminophen-caffeine [facility]) -00 MG per tablet Take 1 tablet by mouth every 6 hours as needed for Headaches 6/21/22   Emily Ibarra DO   ranolazine (RANEXA) 500 MG extended release tablet TAKE 1 TABLET BY MOUTH TWO TIMES A DAY 6/6/22   Madeleine Woodson MD   Compression Stockings MISC by Does not apply route Zippered stockings for daily use on lower extremities (do not wear at night). 20-30mmHg. 4/20/22   Madeleine Woodson MD   [facility] VERIO strip use to test blood sugar 5 times daily 4/14/22   Jason Mukherjee MD   metOLazone (ZAROXOLYN) 2.5 MG tablet TAKE 1 TABLET BY MOUTH TWO TIMES A WEEK AS NEEDED FOR WEIGHT OVER 180 POUNDS. DO NOT TAKE 2 DAYS IN A ROW.   Patient taking differently: Take 2.5 mg by mouth twice a week 3/18/22   Nubia Clarke, APRN - CNS   Continuous Blood Gluc  (FREESTYLE EMERALD 2 READER) MINDY To check glucose levels 3/15/22   Jason Mukherjee MD   albuterol (PROVENTIL) (2.5 MG/3ML) 0.083% nebulizer solution Take 3 mLs by nebulization every 6 hours as needed for Wheezing or Shortness of Breath 1/26/22   Emily Ibarra DO   polyethylene glycol [facility]) 17 GM/SCOOP powder TAKE 17 GRAMS (1 CAPFUL) BY MOUTH NIGHTLY 8/12/21   MD TRAY CornejoZESS 290 MCG CAPS capsule TAKE 1 CAPSULE BY MOUTH ONE TIME A DAY FOR 90 DAYS 6/25/21   Historical Provider, MD FISHERULTI 2 MG TABS tablet Take 2 mg by mouth at bedtime  7/21/21   Historical Provider, MD   methocarbamol (ROBAXIN-750) 750 MG tablet Take 1 tablet by mouth 3 times daily as needed (pain and spasm) 7/26/21   Jeff Saldaña MD   nystatin (MYCOSTATIN) 723571 UNIT/ML suspension Take 5 mLs by mouth 4 times daily 11/18/20   Liliana Spring, DO   traZODone (DESYREL) 50 MG tablet Take 1 tablet by mouth nightly Take it on the day of sleep study 11/16/20   Royal Michael MD   loratadine (CLARITIN) 10 MG tablet TAKE 1 TABLET BY MOUTH ONE TIME A DAY  10/5/20   Liliana Spring, DO   ketoconazole (NIZORAL) 2 % shampoo Apply topically daily as needed. Patient not taking: Reported on 3/9/2023 8/10/20   Liliana Spring, DO   diazePAM (VALIUM) 5 MG tablet Take 5 mg by mouth 2 times daily as needed for Anxiety. Historical Provider, MD   OXYGEN Inhale 2 L into the lungs nightly Sometimes with activity    Historical Provider, MD   oxyCODONE (OXYCONTIN) 20 MG extended release tablet Take 20 mg by mouth every 12 hours. Historical Provider, MD   aspirin 81 MG EC tablet Take 81 mg by mouth daily    Historical Provider, MD   benztropine (COGENTIN) 1 MG tablet Take 1 mg by mouth nightly     Historical Provider, MD   oxyCODONE-acetaminophen (PERCOCET)  MG per tablet Take 1 tablet by mouth every 4 hours as needed for Pain . Earliest Fill Date: 6/8/17 6/8/17   Paulett Crigler, APRN - CNP   duloxetine (CYMBALTA) 60 MG capsule Take 60 mg by mouth 2 times daily. Historical Provider, MD         Assessment/Plan :        1. MARIA D   Has CKD   Maria D 2/2 urinary retention   [place] placed and has 1600 ml urine immediately filling the bag  Hold metolazone   Resume torsemide   Dc iv fluids       2.  CHF   Pulmonary congestion   Start torsemide 3. Anemia  Hb 10.7     Monitor     4. Acid- base disorder. Monitor     5.  Hyponatremia   2/2 hypervolemia     Recommend to dose adjust all medications  based on renal functions  Maintain SBP> 90 mmHg   Daily weights   AVOID NSAIDs  Avoid Nephrotoxins  Monitor Intake/Output  Call if significant decrease in urine output              D/w primary team      Thank you for allowing us to participate in care of [address] [place] [address]         Electronically signed by: Sd Dhillon MD, 3/10/2023, 9:00 AM      [facility] of [address] [place] [address]  Office : [phone]  Fax :[phone]

## 2023-03-10 NOTE — ED NOTES
Attempted to call report, 3T RN was in another pt's room and agreed to call me back.      Singh Roman RN  03/09/23 2035

## 2023-03-10 NOTE — PROGRESS NOTES
Pt unable to void; bladder scan greater than 999 mL; hospitalist messaged for jamison orders;  at bedside. Addendum @ 0041: hospitalist entered orders; see orders for one time straight catheterization & monitor for urinary retention.

## 2023-03-11 LAB
A/G RATIO: 1 (ref 1.1–2.2)
ALBUMIN SERPL-MCNC: 3.6 G/DL (ref 3.4–5)
ALP BLD-CCNC: 94 U/L (ref 40–129)
ALT SERPL-CCNC: 14 U/L (ref 10–40)
ANION GAP SERPL CALCULATED.3IONS-SCNC: 7 MMOL/L (ref 3–16)
AST SERPL-CCNC: 24 U/L (ref 15–37)
BILIRUB SERPL-MCNC: <0.2 MG/DL (ref 0–1)
BUN BLDV-MCNC: 48 MG/DL (ref 7–20)
CALCIUM SERPL-MCNC: 9.4 MG/DL (ref 8.3–10.6)
CHLORIDE BLD-SCNC: 97 MMOL/L (ref 99–110)
CO2: 36 MMOL/L (ref 21–32)
CREAT SERPL-MCNC: 1.4 MG/DL (ref 0.6–1.1)
ESTIMATED AVERAGE GLUCOSE: 246 MG/DL
GFR SERPL CREATININE-BSD FRML MDRD: 45 ML/MIN/{1.73_M2}
GLUCOSE BLD-MCNC: 152 MG/DL (ref 70–99)
GLUCOSE BLD-MCNC: 167 MG/DL (ref 70–99)
GLUCOSE BLD-MCNC: 201 MG/DL (ref 70–99)
GLUCOSE BLD-MCNC: 84 MG/DL (ref 70–99)
GLUCOSE BLD-MCNC: 96 MG/DL (ref 70–99)
HBA1C MFR BLD: 10.2 %
HCT VFR BLD CALC: 34.3 % (ref 36–48)
HEMOGLOBIN: 11.1 G/DL (ref 12–16)
MCH RBC QN AUTO: 28.3 PG (ref 26–34)
MCHC RBC AUTO-ENTMCNC: 32.3 G/DL (ref 31–36)
MCV RBC AUTO: 87.5 FL (ref 80–100)
PDW BLD-RTO: 13.2 % (ref 12.4–15.4)
PERFORMED ON: ABNORMAL
PERFORMED ON: ABNORMAL
PERFORMED ON: NORMAL
PERFORMED ON: NORMAL
PLATELET # BLD: 287 K/UL (ref 135–450)
PMV BLD AUTO: 9.6 FL (ref 5–10.5)
POTASSIUM REFLEX MAGNESIUM: 4.1 MMOL/L (ref 3.5–5.1)
RBC # BLD: 3.92 M/UL (ref 4–5.2)
SODIUM BLD-SCNC: 140 MMOL/L (ref 136–145)
TOTAL PROTEIN: 7.2 G/DL (ref 6.4–8.2)
WBC # BLD: 9 K/UL (ref 4–11)

## 2023-03-11 PROCEDURE — 6370000000 HC RX 637 (ALT 250 FOR IP): Performed by: INTERNAL MEDICINE

## 2023-03-11 PROCEDURE — 94640 AIRWAY INHALATION TREATMENT: CPT

## 2023-03-11 PROCEDURE — 2060000000 HC ICU INTERMEDIATE R&B

## 2023-03-11 PROCEDURE — 80053 COMPREHEN METABOLIC PANEL: CPT

## 2023-03-11 PROCEDURE — 6360000002 HC RX W HCPCS: Performed by: INTERNAL MEDICINE

## 2023-03-11 PROCEDURE — 94761 N-INVAS EAR/PLS OXIMETRY MLT: CPT

## 2023-03-11 PROCEDURE — 6370000000 HC RX 637 (ALT 250 FOR IP): Performed by: NURSE PRACTITIONER

## 2023-03-11 PROCEDURE — 85027 COMPLETE CBC AUTOMATED: CPT

## 2023-03-11 PROCEDURE — 99233 SBSQ HOSP IP/OBS HIGH 50: CPT | Performed by: INTERNAL MEDICINE

## 2023-03-11 PROCEDURE — 36415 COLL VENOUS BLD VENIPUNCTURE: CPT

## 2023-03-11 RX ADMIN — RANOLAZINE 500 MG: 500 TABLET, EXTENDED RELEASE ORAL at 08:21

## 2023-03-11 RX ADMIN — TRAZODONE HYDROCHLORIDE 50 MG: 50 TABLET ORAL at 22:24

## 2023-03-11 RX ADMIN — BENZTROPINE MESYLATE 1 MG: 1 TABLET ORAL at 22:23

## 2023-03-11 RX ADMIN — OXYCODONE HYDROCHLORIDE 10 MG: 10 TABLET, FILM COATED, EXTENDED RELEASE ORAL at 08:22

## 2023-03-11 RX ADMIN — DULOXETINE HYDROCHLORIDE 60 MG: 60 CAPSULE, DELAYED RELEASE ORAL at 22:23

## 2023-03-11 RX ADMIN — ALBUTEROL SULFATE 2.5 MG: 2.5 SOLUTION RESPIRATORY (INHALATION) at 21:08

## 2023-03-11 RX ADMIN — TORSEMIDE 20 MG: 20 TABLET ORAL at 08:21

## 2023-03-11 RX ADMIN — ATORVASTATIN CALCIUM 80 MG: 80 TABLET, FILM COATED ORAL at 22:23

## 2023-03-11 RX ADMIN — METOPROLOL TARTRATE 25 MG: 25 TABLET, FILM COATED ORAL at 22:24

## 2023-03-11 RX ADMIN — ALBUTEROL SULFATE 2.5 MG: 2.5 SOLUTION RESPIRATORY (INHALATION) at 09:29

## 2023-03-11 RX ADMIN — HEPARIN SODIUM 5000 UNITS: 5000 INJECTION INTRAVENOUS; SUBCUTANEOUS at 08:21

## 2023-03-11 RX ADMIN — LINACLOTIDE 145 MCG: 145 CAPSULE, GELATIN COATED ORAL at 07:18

## 2023-03-11 RX ADMIN — INSULIN LISPRO 15 UNITS: 100 INJECTION, SOLUTION INTRAVENOUS; SUBCUTANEOUS at 12:09

## 2023-03-11 RX ADMIN — LEFLUNOMIDE 20 MG: 20 TABLET ORAL at 08:21

## 2023-03-11 RX ADMIN — MONTELUKAST SODIUM 10 MG: 10 TABLET, FILM COATED ORAL at 22:24

## 2023-03-11 RX ADMIN — INSULIN LISPRO 15 UNITS: 100 INJECTION, SOLUTION INTRAVENOUS; SUBCUTANEOUS at 08:21

## 2023-03-11 RX ADMIN — ASPIRIN 81 MG: 81 TABLET, COATED ORAL at 08:20

## 2023-03-11 RX ADMIN — OXYCODONE HYDROCHLORIDE 10 MG: 10 TABLET, FILM COATED, EXTENDED RELEASE ORAL at 22:23

## 2023-03-11 RX ADMIN — CLOPIDOGREL BISULFATE 75 MG: 75 TABLET ORAL at 08:21

## 2023-03-11 RX ADMIN — HEPARIN SODIUM 5000 UNITS: 5000 INJECTION INTRAVENOUS; SUBCUTANEOUS at 22:24

## 2023-03-11 RX ADMIN — INSULIN LISPRO 0 UNITS: 100 INJECTION, SOLUTION INTRAVENOUS; SUBCUTANEOUS at 22:25

## 2023-03-11 RX ADMIN — INSULIN LISPRO 4 UNITS: 100 INJECTION, SOLUTION INTRAVENOUS; SUBCUTANEOUS at 12:10

## 2023-03-11 RX ADMIN — RANOLAZINE 500 MG: 500 TABLET, EXTENDED RELEASE ORAL at 22:23

## 2023-03-11 RX ADMIN — STANDARDIZED SENNA CONCENTRATE AND DOCUSATE SODIUM 2 TABLET: 8.6; 5 TABLET ORAL at 08:21

## 2023-03-11 RX ADMIN — DULOXETINE HYDROCHLORIDE 60 MG: 60 CAPSULE, DELAYED RELEASE ORAL at 08:21

## 2023-03-11 RX ADMIN — STANDARDIZED SENNA CONCENTRATE AND DOCUSATE SODIUM 2 TABLET: 8.6; 5 TABLET ORAL at 22:24

## 2023-03-11 RX ADMIN — METOPROLOL TARTRATE 25 MG: 25 TABLET, FILM COATED ORAL at 08:21

## 2023-03-11 RX ADMIN — LEVOTHYROXINE SODIUM 150 MCG: 150 TABLET ORAL at 07:18

## 2023-03-11 RX ADMIN — INSULIN LISPRO 15 UNITS: 100 INJECTION, SOLUTION INTRAVENOUS; SUBCUTANEOUS at 17:46

## 2023-03-11 ASSESSMENT — PAIN SCALES - WONG BAKER: WONGBAKER_NUMERICALRESPONSE: 0

## 2023-03-11 ASSESSMENT — PAIN DESCRIPTION - ORIENTATION: ORIENTATION: RIGHT;MID

## 2023-03-11 ASSESSMENT — PAIN SCALES - GENERAL
PAINLEVEL_OUTOF10: 7
PAINLEVEL_OUTOF10: 3

## 2023-03-11 ASSESSMENT — PAIN DESCRIPTION - DESCRIPTORS: DESCRIPTORS: ACHING

## 2023-03-11 ASSESSMENT — PAIN DESCRIPTION - LOCATION: LOCATION: HEAD

## 2023-03-11 NOTE — PROGRESS NOTES
Office : [phone]     Fax :[phone]       Nephrology progress  Note      Patient's Name: [place] Boas  8:53 AM  3/11/2023    Reason for Consult: MARIA D      Requesting Physician:  Aide Cardenas DO      Chief Complaint:    Chief Complaint   Patient presents with    Fall     Fall yesterday at 8:00 pm with strike to posterior head, now c/o left arm numbness and left leg weakness.  states fall due to dizziness. History of Present Ilness:    [place] Bo[place] is a 47 y.o. female with prior history of breast cancer , CVA, CAD who was admitted with c/o dizziness. Initial lab work showed BUN of 68 and creat of 2.3. baseline creatinine is 1.4     Sodium is low at 128   Chloride level low at 84     Interval hx     Renal function better   Good UOP       I/O last 3 completed shifts:   In: 1901.7 [P.O.:960; I.V.:941.4; IV Piggyback:0.3]  Out: 5431 [Urine:5431]    Past Medical History:   Diagnosis Date    Anxiety     Anxiety and depression     Arthritis     not sure of specific type    Asthma     CAD (coronary artery disease)     Cancer ([facility] [address] 2007    left breast    Cerebral artery occlusion with cerebral infarction ([facility] [address]     2000, 2001    CHF (congestive heart failure) ([address] 2018    Chronic kidney disease     renal insufficency/to see Dr Nikki Adkins    Chronic pain     Constipation     COPD (chronic obstructive pulmonary disease) ([facility] [address]     Depression     Diabetes mellitus ([facility] [address]     Diabetic polyneuropathy associated with type 2 diabetes mellitus ([address] 2/2/2018    Dysthymia 2/2/2018    ESBL (extended spectrum beta-lactamase) producing bacteria infection 03/14/2018    urine    Fibromyalgia     Gastric ulcer, unspecified as acute or chronic, without mention of hemorrhage, perforation, or obstruction     GERD (gastroesophageal reflux disease)     Gout     HIGH CHOLESTEROL     Hypertension     Hypothyroidism     Pneumonia 3/25/2020    Pyelonephritis     Severe persistent asthma without complication 2254    Thyroid disease     TIA (transient ischemic attack)     with occasional left leg and hand weakness       Past Surgical History:   Procedure Laterality Date    BREAST SURGERY      left mastectomy    CARPAL TUNNEL RELEASE      Bilateral    FINGER CONTRACTURE SURGERY      HYSTERECTOMY (CERVIX STATUS UNKNOWN)  2005    USO    MASTECTOMY      TONSILLECTOMY      UPPER GASTROINTESTINAL ENDOSCOPY      stretched esophagous        Family History   Problem Relation Age of Onset    Asthma Other     Cancer Other     Depression Other     Diabetes Other     Hypertension Other     High Cholesterol Other     Migraines Other     Heart Attack Father 72         of MI    High Blood Pressure Mother     Diabetes Mother            Current Medications:    torsemide (DEMADEX) tablet 20 mg, Daily  insulin lispro (HUMALOG) injection vial 15 Units, TID WC  insulin glargine (LANTUS) injection vial 50 Units, Nightly  linaclotide (LINZESS) capsule 145 mcg, QAM AC  sennosides-docusate sodium (SENOKOT-S) 8.6-50 MG tablet 2 tablet, BID  aspirin EC tablet 81 mg, Daily  benztropine (COGENTIN) tablet 1 mg, Nightly  butalbital-acetaminophen-caffeine (FIORICET, ESGIC) per tablet 1 tablet, Q6H PRN  diazePAM (VALIUM) tablet 5 mg, BID PRN  DULoxetine (CYMBALTA) extended release capsule 60 mg, BID  leflunomide (ARAVA) tablet 20 mg, Daily  levothyroxine (SYNTHROID) tablet 150 mcg, Daily  metoprolol tartrate (LOPRESSOR) tablet 25 mg, BID  montelukast (SINGULAIR) tablet 10 mg, Nightly  nitroGLYCERIN (NITROSTAT) SL tablet 0.4 mg, Q5 Min PRN  insulin lispro (HUMALOG) injection vial 0-16 Units, TID WC  insulin lispro (HUMALOG) injection vial 0-4 Units, Nightly  traZODone (DESYREL) tablet 50 mg, Nightly  ranolazine (RANEXA) extended release tablet 500 mg, BID  ondansetron (ZOFRAN-ODT) disintegrating tablet 4 mg, Q8H PRN   Or  ondansetron (ZOFRAN) injection 4 mg, Q6H PRN  polyethylene glycol (GLYCOLAX) packet 17 g, Daily PRN  perflutren lipid microspheres (DEFINITY) injection 1.5 mL, ONCE PRN  acetaminophen (TYLENOL) tablet 650 mg, Q4H PRN   Or  acetaminophen (TYLENOL) suppository 650 mg, Q4H PRN  atorvastatin (LIPITOR) tablet 80 mg, Nightly  labetalol (NORMODYNE;TRANDATE) injection 10 mg, Q10 Min PRN  clopidogrel (PLAVIX) tablet 75 mg, Daily  heparin (porcine) injection 5,000 Units, BID  dextrose bolus 10% 125 mL, PRN   Or  dextrose bolus 10% 250 mL, PRN  glucagon (rDNA) injection 1 mg, PRN  dextrose 10 % infusion, Continuous PRN  albuterol (PROVENTIL) nebulizer solution 2.5 mg, BID  albuterol (PROVENTIL) nebulizer solution 2.5 mg, Q4H PRN  oxyCODONE (OXYCONTIN) extended release tablet 10 mg, 2 times per day  meclizine (ANTIVERT) tablet 12.5 mg, TID PRN  oxyCODONE (ROXICODONE) immediate release tablet 5 mg, Q6H PRN        Physical exam:     Vitals:  /71   Pulse 76   Temp 98.7 °F (37.1 °C) (Axillary)   Resp 16   Ht 5' 4\" (1.626 m)   Wt 199 lb 12.8 oz (90.6 kg)   SpO2 90%   BMI 34.30 kg/m²   Constitutional:  OAA X3 NAD  Skin: no rash, turgor wnl  Heent:  eomi, mmm  Neck: no bruits or jvd noted  Cardiovascular:  S1, S2 without m/r/g  Respiratory: CTA B without w/r/r  Abdomen:  +bs, soft, nt, nd  Ext: has edema Left arm and hand  Psychiatric: mood and affect appropriate  Musculoskeletal:  Rom, muscular strength intact    Labs:  CBC:   Recent Labs     03/09/23  1544 03/10/23  0200 03/11/23  0725   WBC 10.7 8.2 9.0   HGB 11.6* 10.7* 11.1*    267 287     BMP:    Recent Labs     03/09/23  1544 03/11/23  0723   * 140   K 4.6 4.1   CL 84* 97*   CO2 32 36*   BUN 68* 48*   CREATININE 2.3* 1.4*   GLUCOSE 192* 167*     Ca/Mg/Phos:   Recent Labs     03/09/23  1544 03/11/23  0723   CALCIUM 9.1 9.4   MG 2.30  -- Hepatic:   Recent Labs     03/09/23  1544 03/11/23  0723   AST 84* 24   ALT 22 14   BILITOT <0.2 <0.2   ALKPHOS 106 94     Troponin:   Recent Labs     03/09/23  1544 03/09/23  2230 03/10/23  0200   TROPONINI 0.03* 0.02* 0.02*     BNP: No results for input(s): BNP in the last 72 hours. Lipids:   Recent Labs     03/10/23  0200   CHOL 226*   TRIG 530*   HDL 25*   LDLCALC see below   LABVLDL see below     ABGs: No results for input(s): PHART, PO2ART, EVP6LXU in the last 72 hours. INR:   Recent Labs     03/09/23  1544   INR 0.94     UA:  Recent Labs     03/09/23  1544   COLORU Yellow   CLARITYU Clear   GLUCOSEU 500*   BILIRUBINUR Negative   KETUA Negative   SPECGRAV 1.010   BLOODU Negative   PHUR 7.0   PROTEINU Negative   UROBILINOGEN 0.2   NITRU Negative   LEUKOCYTESUR Negative   LABMICR Not Indicated   URINETYPE NotGiven      Urine Microscopic: No results for input(s): LABCAST, BACTERIA, COMU, HYALCAST, WBCUA, RBCUA, EPIU in the last 72 hours. Urine Culture: No results for input(s): LABURIN in the last 72 hours. Urine Chemistry: No results for input(s): Lincoln Carpenter, PROTEINUR, NAUR in the last 72 hours. IMAGING:  MRA NECK WO CONTRAST   Final Result   No acute intracranial abnormality. Old lacunar infarct in the right central miri. Mild parenchymal volume loss. Minimal chronic microvascular disease. Motion artifact. Otherwise, unremarkable noncontrast MRA of the head. Motion artifact. No eyes, unremarkable noncontrast MRA of the neck. MRA HEAD WO CONTRAST   Final Result   No acute intracranial abnormality. Old lacunar infarct in the right central miri. Mild parenchymal volume loss. Minimal chronic microvascular disease. Motion artifact. Otherwise, unremarkable noncontrast MRA of the head. Motion artifact. No eyes, unremarkable noncontrast MRA of the neck.          MRI BRAIN WO CONTRAST MR WITNESS   Final Result   No acute intracranial abnormality. Old lacunar infarct in the right central miri. Mild parenchymal volume loss. Minimal chronic microvascular disease. Motion artifact. Otherwise, unremarkable noncontrast MRA of the head. Motion artifact. No eyes, unremarkable noncontrast MRA of the neck. XR CHEST PORTABLE   Final Result   Pulmonary vascular congestion along with increased interstitial opacity,   suggesting interstitial edema. CT HEAD WO CONTRAST   Final Result   Small suspected lacunar infarct within the right paramedian miri. No large territory acute cortical infarct, hemorrhage or intracranial mass   effect. The findings were sent to the Radiology Results [address] at 2:56   pm on 3/9/2023 to be communicated to a licensed caregiver, received by   ALBERTO Gibbons at 2:59 pm.                   Assessment/Plan :        1.  MARIA D   Improving     Has CKD   Maria D 2/2 urinary retention   [place] placed and has 1600 ml urine immediately filling the bag  Hold metolazone   Resume torsemide   Dc iv fluids       2. CHF   Pulmonary congestion   Continue  torsemide     3. Anemia  Hb 10.7     Monitor     4. Acid- base disorder. Monitor     5.  Hyponatremia   2/2 hypervolemia     Recommend to dose adjust all medications  based on renal functions  Maintain SBP> 90 mmHg   Daily weights   AVOID NSAIDs  Avoid Nephrotoxins  Monitor Intake/Output  Call if significant decrease in urine output              D/w primary team      Thank you for allowing us to participate in care of [address] [place] [address]         Electronically signed by: Avelina Bryson MD, 3/11/2023, 8:53 AM      [facility]  [address]  Office : [phone]  Fax :[phone]

## 2023-03-11 NOTE — PROGRESS NOTES
Assessment complete. Vitals:    03/10/23 2302   BP: 125/70   Pulse: 80   Resp: 16   Temp: 98 °F (36.7 °C)   SpO2: 92%   No SOB or any distress noted. pt has been to the bathroom twice. The second time pt had a large BM and states that her abdomen feels better. All medications given. Pt states that she has pain in her right calf. Pain with pressure and pain to touch. Area is not red but is warm. Will send message to  notify NP of this. Linzess and lydia ordered for pt as pt takes at home. POC discussed with both pt and . No  used as they have refused. Call light within reach, pt encouraged to call if any needs arise. No further requests at this time. Will continue to monitor.

## 2023-03-11 NOTE — PLAN OF CARE
Problem: Discharge Planning  Goal: Discharge to home or other facility with appropriate resources  Outcome: Progressing     Problem: Skin/Tissue Integrity  Goal: Absence of new skin breakdown  Description: 1. Monitor for areas of redness and/or skin breakdown  2. Assess vascular access sites hourly  3. Every 4-6 hours minimum:  Change oxygen saturation probe site  4. Every 4-6 hours:  If on nasal continuous positive airway pressure, respiratory therapy assess nares and determine need for appliance change or resting period.   Outcome: Progressing     Problem: Safety - Adult  Goal: Free from fall injury  Outcome: Progressing     Problem: ABCDS Injury Assessment  Goal: Absence of physical injury  Outcome: Progressing     Problem: Respiratory - Adult  Goal: Achieves optimal ventilation and oxygenation  Outcome: Progressing  Flowsheets (Taken 3/10/2023 2053 by Diony Jarrett RN)  Achieves optimal ventilation and oxygenation:   Assess for changes in respiratory status   Assess for changes in mentation and behavior   Position to facilitate oxygenation and minimize respiratory effort   Oxygen supplementation based on oxygen saturation or arterial blood gases   Respiratory therapy support as indicated     Problem: Cardiovascular - Adult  Goal: Maintains optimal cardiac output and hemodynamic stability  Outcome: Progressing  Flowsheets (Taken 3/10/2023 2053 by Diony Jarrett RN)  Maintains optimal cardiac output and hemodynamic stability:   Monitor blood pressure and heart rate   Monitor urine output and notify Licensed Independent Practitioner for values outside of normal range   Assess for signs of decreased cardiac output   Administer vasoactive medications as ordered     Problem: Skin/Tissue Integrity - Adult  Goal: Oral mucous membranes remain intact  Outcome: Progressing     Problem: Musculoskeletal - Adult  Goal: Return mobility to safest level of function  Outcome: Progressing  Flowsheets (Taken 3/10/2023 2053 by Raenette Carrow, RN)  Return Mobility to Safest Level of Function:   Assess patient stability and activity tolerance for standing, transferring and ambulating with or without assistive devices   Assist with transfers and ambulation using safe patient handling equipment as needed   Instruct patient/family in ordered activity level     Problem: Metabolic/Fluid and Electrolytes - Adult  Goal: Electrolytes maintained within normal limits  Outcome: Progressing     Problem: Metabolic/Fluid and Electrolytes - Adult  Goal: Hemodynamic stability and optimal renal function maintained  Outcome: Progressing     Problem: Metabolic/Fluid and Electrolytes - Adult  Goal: Glucose maintained within prescribed range  Outcome: Progressing     Problem: Hematologic - Adult  Goal: Maintains hematologic stability  Outcome: Progressing     Problem: Chronic Conditions and Co-morbidities  Goal: Patient's chronic conditions and co-morbidity symptoms are monitored and maintained or improved  Outcome: Progressing     Problem: Neurosensory - Adult  Goal: Achieves stable or improved neurological status  Outcome: Progressing

## 2023-03-12 LAB
ANION GAP SERPL CALCULATED.3IONS-SCNC: 5 MMOL/L (ref 3–16)
BUN BLDV-MCNC: 34 MG/DL (ref 7–20)
CALCIUM SERPL-MCNC: 9.1 MG/DL (ref 8.3–10.6)
CHLORIDE BLD-SCNC: 98 MMOL/L (ref 99–110)
CO2: 36 MMOL/L (ref 21–32)
CREAT SERPL-MCNC: 1.2 MG/DL (ref 0.6–1.1)
GFR SERPL CREATININE-BSD FRML MDRD: 54 ML/MIN/{1.73_M2}
GLUCOSE BLD-MCNC: 154 MG/DL (ref 70–99)
GLUCOSE BLD-MCNC: 172 MG/DL (ref 70–99)
GLUCOSE BLD-MCNC: 196 MG/DL (ref 70–99)
GLUCOSE BLD-MCNC: 210 MG/DL (ref 70–99)
GLUCOSE BLD-MCNC: 269 MG/DL (ref 70–99)
GLUCOSE BLD-MCNC: 290 MG/DL (ref 70–99)
HCT VFR BLD CALC: 33.7 % (ref 36–48)
HEMOGLOBIN: 10.9 G/DL (ref 12–16)
MCH RBC QN AUTO: 28.5 PG (ref 26–34)
MCHC RBC AUTO-ENTMCNC: 32.2 G/DL (ref 31–36)
MCV RBC AUTO: 88.4 FL (ref 80–100)
PDW BLD-RTO: 13.2 % (ref 12.4–15.4)
PERFORMED ON: ABNORMAL
PLATELET # BLD: 295 K/UL (ref 135–450)
PMV BLD AUTO: 9.3 FL (ref 5–10.5)
POTASSIUM SERPL-SCNC: 4.5 MMOL/L (ref 3.5–5.1)
RBC # BLD: 3.82 M/UL (ref 4–5.2)
SODIUM BLD-SCNC: 139 MMOL/L (ref 136–145)
WBC # BLD: 10.4 K/UL (ref 4–11)

## 2023-03-12 PROCEDURE — 6360000002 HC RX W HCPCS: Performed by: INTERNAL MEDICINE

## 2023-03-12 PROCEDURE — 85027 COMPLETE CBC AUTOMATED: CPT

## 2023-03-12 PROCEDURE — 2060000000 HC ICU INTERMEDIATE R&B

## 2023-03-12 PROCEDURE — 6370000000 HC RX 637 (ALT 250 FOR IP): Performed by: NURSE PRACTITIONER

## 2023-03-12 PROCEDURE — 99233 SBSQ HOSP IP/OBS HIGH 50: CPT | Performed by: INTERNAL MEDICINE

## 2023-03-12 PROCEDURE — 94761 N-INVAS EAR/PLS OXIMETRY MLT: CPT

## 2023-03-12 PROCEDURE — 6370000000 HC RX 637 (ALT 250 FOR IP): Performed by: INTERNAL MEDICINE

## 2023-03-12 PROCEDURE — 94640 AIRWAY INHALATION TREATMENT: CPT

## 2023-03-12 PROCEDURE — 80048 BASIC METABOLIC PNL TOTAL CA: CPT

## 2023-03-12 RX ADMIN — DULOXETINE HYDROCHLORIDE 60 MG: 60 CAPSULE, DELAYED RELEASE ORAL at 08:30

## 2023-03-12 RX ADMIN — CLOPIDOGREL BISULFATE 75 MG: 75 TABLET ORAL at 08:30

## 2023-03-12 RX ADMIN — TRAZODONE HYDROCHLORIDE 50 MG: 50 TABLET ORAL at 21:44

## 2023-03-12 RX ADMIN — LINACLOTIDE 145 MCG: 145 CAPSULE, GELATIN COATED ORAL at 06:22

## 2023-03-12 RX ADMIN — TORSEMIDE 20 MG: 20 TABLET ORAL at 08:30

## 2023-03-12 RX ADMIN — RANOLAZINE 500 MG: 500 TABLET, EXTENDED RELEASE ORAL at 21:44

## 2023-03-12 RX ADMIN — ATORVASTATIN CALCIUM 80 MG: 80 TABLET, FILM COATED ORAL at 21:43

## 2023-03-12 RX ADMIN — METOPROLOL TARTRATE 25 MG: 25 TABLET, FILM COATED ORAL at 08:30

## 2023-03-12 RX ADMIN — HEPARIN SODIUM 5000 UNITS: 5000 INJECTION INTRAVENOUS; SUBCUTANEOUS at 21:26

## 2023-03-12 RX ADMIN — LEVOTHYROXINE SODIUM 150 MCG: 150 TABLET ORAL at 06:22

## 2023-03-12 RX ADMIN — DIAZEPAM 5 MG: 5 TABLET ORAL at 00:30

## 2023-03-12 RX ADMIN — INSULIN GLARGINE 50 UNITS: 100 INJECTION, SOLUTION SUBCUTANEOUS at 21:29

## 2023-03-12 RX ADMIN — ASPIRIN 81 MG: 81 TABLET, COATED ORAL at 08:30

## 2023-03-12 RX ADMIN — INSULIN LISPRO 15 UNITS: 100 INJECTION, SOLUTION INTRAVENOUS; SUBCUTANEOUS at 08:31

## 2023-03-12 RX ADMIN — HEPARIN SODIUM 5000 UNITS: 5000 INJECTION INTRAVENOUS; SUBCUTANEOUS at 08:30

## 2023-03-12 RX ADMIN — INSULIN LISPRO 15 UNITS: 100 INJECTION, SOLUTION INTRAVENOUS; SUBCUTANEOUS at 12:42

## 2023-03-12 RX ADMIN — METOPROLOL TARTRATE 25 MG: 25 TABLET, FILM COATED ORAL at 21:43

## 2023-03-12 RX ADMIN — OXYCODONE 5 MG: 5 TABLET ORAL at 00:13

## 2023-03-12 RX ADMIN — STANDARDIZED SENNA CONCENTRATE AND DOCUSATE SODIUM 2 TABLET: 8.6; 5 TABLET ORAL at 08:30

## 2023-03-12 RX ADMIN — ALBUTEROL SULFATE 2.5 MG: 2.5 SOLUTION RESPIRATORY (INHALATION) at 08:19

## 2023-03-12 RX ADMIN — OXYCODONE HYDROCHLORIDE 10 MG: 10 TABLET, FILM COATED, EXTENDED RELEASE ORAL at 19:59

## 2023-03-12 RX ADMIN — LEFLUNOMIDE 20 MG: 20 TABLET ORAL at 08:30

## 2023-03-12 RX ADMIN — INSULIN LISPRO 4 UNITS: 100 INJECTION, SOLUTION INTRAVENOUS; SUBCUTANEOUS at 17:21

## 2023-03-12 RX ADMIN — STANDARDIZED SENNA CONCENTRATE AND DOCUSATE SODIUM 2 TABLET: 8.6; 5 TABLET ORAL at 21:23

## 2023-03-12 RX ADMIN — INSULIN LISPRO 15 UNITS: 100 INJECTION, SOLUTION INTRAVENOUS; SUBCUTANEOUS at 17:21

## 2023-03-12 RX ADMIN — DIAZEPAM 5 MG: 5 TABLET ORAL at 21:26

## 2023-03-12 RX ADMIN — INSULIN GLARGINE 50 UNITS: 100 INJECTION, SOLUTION SUBCUTANEOUS at 00:31

## 2023-03-12 RX ADMIN — OXYCODONE HYDROCHLORIDE 10 MG: 10 TABLET, FILM COATED, EXTENDED RELEASE ORAL at 08:30

## 2023-03-12 RX ADMIN — BENZTROPINE MESYLATE 1 MG: 1 TABLET ORAL at 21:42

## 2023-03-12 RX ADMIN — DULOXETINE HYDROCHLORIDE 60 MG: 60 CAPSULE, DELAYED RELEASE ORAL at 21:42

## 2023-03-12 RX ADMIN — RANOLAZINE 500 MG: 500 TABLET, EXTENDED RELEASE ORAL at 08:30

## 2023-03-12 RX ADMIN — MONTELUKAST SODIUM 10 MG: 10 TABLET, FILM COATED ORAL at 21:23

## 2023-03-12 RX ADMIN — ALBUTEROL SULFATE 2.5 MG: 2.5 SOLUTION RESPIRATORY (INHALATION) at 21:53

## 2023-03-12 ASSESSMENT — PAIN DESCRIPTION - ORIENTATION
ORIENTATION: RIGHT;POSTERIOR;ANTERIOR
ORIENTATION: LEFT

## 2023-03-12 ASSESSMENT — PAIN SCALES - GENERAL
PAINLEVEL_OUTOF10: 3
PAINLEVEL_OUTOF10: 5
PAINLEVEL_OUTOF10: 10
PAINLEVEL_OUTOF10: 10

## 2023-03-12 ASSESSMENT — PAIN DESCRIPTION - DESCRIPTORS
DESCRIPTORS: ACHING;THROBBING
DESCRIPTORS: ACHING
DESCRIPTORS: ACHING

## 2023-03-12 ASSESSMENT — PAIN DESCRIPTION - LOCATION
LOCATION: HEAD;ARM
LOCATION: HEAD
LOCATION: HEAD

## 2023-03-12 ASSESSMENT — PAIN SCALES - WONG BAKER
WONGBAKER_NUMERICALRESPONSE: 0
WONGBAKER_NUMERICALRESPONSE: 0

## 2023-03-12 ASSESSMENT — PAIN DESCRIPTION - ONSET: ONSET: ON-GOING

## 2023-03-12 ASSESSMENT — PAIN DESCRIPTION - FREQUENCY: FREQUENCY: CONTINUOUS

## 2023-03-12 ASSESSMENT — PAIN DESCRIPTION - PAIN TYPE: TYPE: ACUTE PAIN;CHRONIC PAIN

## 2023-03-12 NOTE — PLAN OF CARE
Problem: Discharge Planning  Goal: Discharge to home or other facility with appropriate resources  3/12/2023 0923 by Maria Esther Coronel RN  Outcome: Progressing  Flowsheets (Taken 3/12/2023 0815)  Discharge to home or other facility with appropriate resources:   Identify barriers to discharge with patient and caregiver   Arrange for needed discharge resources and transportation as appropriate     Problem: Pain  Goal: Verbalizes/displays adequate comfort level or baseline comfort level  3/12/2023 0923 by Maria Esther Coronel RN  Outcome: Progressing     Problem: Skin/Tissue Integrity  Goal: Absence of new skin breakdown  Description: 1. Monitor for areas of redness and/or skin breakdown  2. Assess vascular access sites hourly  3. Every 4-6 hours minimum:  Change oxygen saturation probe site  4. Every 4-6 hours:  If on nasal continuous positive airway pressure, respiratory therapy assess nares and determine need for appliance change or resting period.   3/12/2023 0313 by Maria Esther Coronel RN  Outcome: Progressing     Problem: Safety - Adult  Goal: Free from fall injury  3/12/2023 0923 by Maria Esther Coronel RN  Outcome: Progressing     Problem: Respiratory - Adult  Goal: Achieves optimal ventilation and oxygenation  3/12/2023 0923 by Maria Esther Coronel RN  Outcome: Progressing     Problem: Cardiovascular - Adult  Goal: Maintains optimal cardiac output and hemodynamic stability  3/12/2023 0923 by Maria Esther Coronel RN  Outcome: Progressing     Problem: Skin/Tissue Integrity - Adult  Goal: Skin integrity remains intact  3/12/2023 0923 by Maria Esther Coronel RN  Outcome: Progressing     Problem: Musculoskeletal - Adult  Goal: Return mobility to safest level of function  3/12/2023 0923 by Maria Esther Coronel RN  Outcome: Progressing     Problem: Gastrointestinal - Adult  Goal: Minimal or absence of nausea and vomiting  3/12/2023 0923 by Maria Esther Coronel RN  Outcome: Progressing     Problem: Genitourinary - Adult  Goal: Absence of urinary retention  3/12/2023 7050 by Gloria Moore RN  Outcome: Progressing     Problem: Metabolic/Fluid and Electrolytes - Adult  Goal: Electrolytes maintained within normal limits  3/12/2023 0923 by Gloria Moore RN  Outcome: Progressing  Flowsheets (Taken 3/12/2023 0815)  Electrolytes maintained within normal limits:   Monitor labs and assess patient for signs and symptoms of electrolyte imbalances   Monitor response to electrolyte replacements, including repeat lab results as appropriate   Administer electrolyte replacement as ordered     Problem: Metabolic/Fluid and Electrolytes - Adult  Goal: Hemodynamic stability and optimal renal function maintained  3/12/2023 0923 by Gloria Moore RN  Outcome: Progressing  Flowsheets (Taken 3/12/2023 0815)  Hemodynamic stability and optimal renal function maintained: Monitor labs and assess for signs and symptoms of volume excess or deficit     Problem: Hematologic - Adult  Goal: Maintains hematologic stability  3/12/2023 0923 by Gloria Moore RN  Outcome: Progressing  Flowsheets (Taken 3/12/2023 0815)  Maintains hematologic stability: Assess for signs and symptoms of bleeding or hemorrhage     Problem: Chronic Conditions and Co-morbidities  Goal: Patient's chronic conditions and co-morbidity symptoms are monitored and maintained or improved  3/12/2023 0923 by Gloria Moore RN  Outcome: Progressing  [address] (Taken 3/12/2023 0815)  Care Plan - Patient's Chronic Conditions and Co-Morbidity Symptoms are Monitored and Maintained or Improved: Monitor and assess patient's chronic conditions and comorbid symptoms for stability, deterioration, or improvement     Problem: Neurosensory - Adult  Goal: Achieves stable or improved neurological status  Outcome: Progressing  Flowsheets (Taken 3/11/2023 2233 by Rachelle English RN)  Achieves stable or improved neurological status:   Assess for and report changes in neurological status   Monitor temperature, glucose, and sodium.  Initiate appropriate interventions as ordered

## 2023-03-12 NOTE — PROGRESS NOTES
Office : [phone]     Fax :[phone]       Nephrology progress  Note      Patient's Name: Kyra Gonzales  4:12 PM  3/12/2023    Reason for Consult: MARIA D      Requesting Physician:  Demetria Geiger DO      Chief Complaint:    Chief Complaint   Patient presents with    Fall     Fall yesterday at 8:00 pm with strike to posterior head, now c/o left arm numbness and left leg weakness.  states fall due to dizziness. History of Present Ilness:    Kyra Gonzales is a 47 y.o. female with prior history of breast cancer , CVA, CAD who was admitted with c/o dizziness. Initial lab work showed BUN of 68 and creat of 2.3. baseline creatinine is 1.4     Sodium is low at 128   Chloride level low at 84     Interval hx     Renal function better   Good UOP       I/O last 3 completed shifts:   In: 12 [P.O.:687; I.V.:50]  Out: 3825 [Urine:3825]    Past Medical History:   Diagnosis Date    Anxiety     Anxiety and depression     Arthritis     not sure of specific type    Asthma     CAD (coronary artery disease)     Cancer ([address] 2007    left breast    Cerebral artery occlusion with cerebral infarction ([address]     2000, 2001    CHF (congestive heart failure) ([address] 2018    Chronic kidney disease     renal insufficency/to see Dr Audrey Gorman    Chronic pain     Constipation     COPD (chronic obstructive pulmonary disease) ([address]     Depression     Diabetes mellitus ([address]     Diabetic polyneuropathy associated with type 2 diabetes mellitus ([address] 2/2/2018    Dysthymia 2/2/2018    ESBL (extended spectrum beta-lactamase) producing bacteria infection 03/14/2018    urine    Fibromyalgia     Gastric ulcer, unspecified as acute or chronic, without mention of hemorrhage, perforation, or obstruction GERD (gastroesophageal reflux disease)     Gout     HIGH CHOLESTEROL     Hypertension     Hypothyroidism     Pneumonia 3/25/2020    Pyelonephritis     Severe persistent asthma without complication     Thyroid disease     TIA (transient ischemic attack)     with occasional left leg and hand weakness       Past Surgical History:   Procedure Laterality Date    BREAST SURGERY      left mastectomy    CARPAL TUNNEL RELEASE      Bilateral    FINGER CONTRACTURE SURGERY      HYSTERECTOMY (CERVIX STATUS UNKNOWN)  2005    USO    MASTECTOMY      TONSILLECTOMY      UPPER GASTROINTESTINAL ENDOSCOPY  2019    stretched esophagous        Family History   Problem Relation Age of Onset    Asthma Other     Cancer Other     Depression Other     Diabetes Other     Hypertension Other     High Cholesterol Other     Migraines Other     Heart Attack Father 72         of MI    High Blood Pressure Mother     Diabetes Mother            Current Medications:    torsemide (DEMADEX) tablet 20 mg, Daily  insulin lispro (HUMALOG) injection vial 15 Units, TID WC  insulin glargine (LANTUS) injection vial 50 Units, Nightly  linaclotide (LINZESS) capsule 145 mcg, QAM AC  sennosides-docusate sodium (SENOKOT-S) 8.6-50 MG tablet 2 tablet, BID  aspirin EC tablet 81 mg, Daily  benztropine (COGENTIN) tablet 1 mg, Nightly  butalbital-acetaminophen-caffeine (FIORICET, ESGIC) per tablet 1 tablet, Q6H PRN  diazePAM (VALIUM) tablet 5 mg, BID PRN  DULoxetine (CYMBALTA) extended release capsule 60 mg, BID  leflunomide (ARAVA) tablet 20 mg, Daily  levothyroxine (SYNTHROID) tablet 150 mcg, Daily  metoprolol tartrate (LOPRESSOR) tablet 25 mg, BID  montelukast (SINGULAIR) tablet 10 mg, Nightly  nitroGLYCERIN (NITROSTAT) SL tablet 0.4 mg, Q5 Min PRN  insulin lispro (HUMALOG) injection vial 0-16 Units, TID WC  insulin lispro (HUMALOG) injection vial 0-4 Units, Nightly  traZODone (DESYREL) tablet 50 mg, Nightly  ranolazine (RANEXA) extended release tablet 500 mg, BID  ondansetron (ZOFRAN-ODT) disintegrating tablet 4 mg, Q8H PRN   Or  ondansetron (ZOFRAN) injection 4 mg, Q6H PRN  polyethylene glycol (GLYCOLAX) packet 17 g, Daily PRN  perflutren lipid microspheres (DEFINITY) injection 1.5 mL, ONCE PRN  acetaminophen (TYLENOL) tablet 650 mg, Q4H PRN   Or  acetaminophen (TYLENOL) suppository 650 mg, Q4H PRN  atorvastatin (LIPITOR) tablet 80 mg, Nightly  labetalol (NORMODYNE;TRANDATE) injection 10 mg, Q10 Min PRN  clopidogrel (PLAVIX) tablet 75 mg, Daily  heparin (porcine) injection 5,000 Units, BID  dextrose bolus 10% 125 mL, PRN   Or  dextrose bolus 10% 250 mL, PRN  glucagon (rDNA) injection 1 mg, PRN  dextrose 10 % infusion, Continuous PRN  albuterol (PROVENTIL) nebulizer solution 2.5 mg, BID  albuterol (PROVENTIL) nebulizer solution 2.5 mg, Q4H PRN  oxyCODONE (OXYCONTIN) extended release tablet 10 mg, 2 times per day  meclizine (ANTIVERT) tablet 12.5 mg, TID PRN  oxyCODONE (ROXICODONE) immediate release tablet 5 mg, Q6H PRN        Physical exam:     Vitals:  /69   Pulse 70   Temp 97.7 °F (36.5 °C) (Oral)   Resp 16   Ht 5' 4\" (1.626 m)   Wt 187 lb (84.8 kg)   SpO2 93%   BMI 32.10 kg/m²   Constitutional:  OAA X3 NAD  Skin: no rash, turgor wnl  Heent:  eomi, mmm  Neck: no bruits or jvd noted  Cardiovascular:  S1, S2 without m/r/g  Respiratory: CTA B without w/r/r  Abdomen:  +bs, soft, nt, nd  Ext: has edema Left arm and hand  Psychiatric: mood and affect appropriate  Musculoskeletal:  Rom, muscular strength intact    Labs:  CBC:   Recent Labs     03/10/23  0200 03/11/23  0725 03/12/23  0645   WBC 8.2 9.0 10.4   HGB 10.7* 11.1* 10.9*    287 295     BMP:    Recent Labs     03/09/23  1544 03/11/23  0723 03/12/23  0645   * 140 139   K 4.6 4.1 4.5   CL 84* 97* 98*   CO2 32 36* 36*   BUN 68* 48* 34*   CREATININE 2.3* 1.4* 1.2*   GLUCOSE 192* 167* 172*     Ca/Mg/Phos:   Recent Labs     03/09/23  1544 03/11/23  0723 03/12/23  0645   CALCIUM 9.1 9.4 9.1 MG 2.30  --   --      Hepatic:   Recent Labs     03/09/23  1544 03/11/23  0723   AST 84* 24   ALT 22 14   BILITOT <0.2 <0.2   ALKPHOS 106 94     Troponin:   Recent Labs     03/09/23  1544 03/09/23  2230 03/10/23  0200   TROPONINI 0.03* 0.02* 0.02*     BNP: No results for input(s): BNP in the last 72 hours. Lipids:   Recent Labs     03/10/23  0200   CHOL 226*   TRIG 530*   HDL 25*   LDLCALC see below   LABVLDL see below     ABGs: No results for input(s): PHART, PO2ART, UCV2BHM in the last 72 hours. INR:   Recent Labs     03/09/23  1544   INR 0.94     UA:  Recent Labs     03/09/23  1544   COLORU Yellow   CLARITYU Clear   GLUCOSEU 500*   BILIRUBINUR Negative   KETUA Negative   SPECGRAV 1.010   BLOODU Negative   PHUR 7.0   PROTEINU Negative   UROBILINOGEN 0.2   NITRU Negative   LEUKOCYTESUR Negative   LABMICR Not Indicated   URINETYPE NotGiven      Urine Microscopic: No results for input(s): LABCAST, BACTERIA, COMU, HYALCAST, WBCUA, RBCUA, EPIU in the last 72 hours. Urine Culture: No results for input(s): LABURIN in the last 72 hours. Urine Chemistry: No results for input(s): Francisco Javier Mungo, PROTEINUR, NAUR in the last 72 hours. IMAGING:  MRA NECK WO CONTRAST   Final Result   No acute intracranial abnormality. Old lacunar infarct in the right central miri. Mild parenchymal volume loss. Minimal chronic microvascular disease. Motion artifact. Otherwise, unremarkable noncontrast MRA of the head. Motion artifact. No eyes, unremarkable noncontrast MRA of the neck. MRA HEAD WO CONTRAST   Final Result   No acute intracranial abnormality. Old lacunar infarct in the right central miri. Mild parenchymal volume loss. Minimal chronic microvascular disease. Motion artifact. Otherwise, unremarkable noncontrast MRA of the head. Motion artifact. No eyes, unremarkable noncontrast MRA of the neck.          MRI BRAIN WO CONTRAST MR WITNESS   Final Result   No acute intracranial abnormality. Old lacunar infarct in the right central miri. Mild parenchymal volume loss. Minimal chronic microvascular disease. Motion artifact. Otherwise, unremarkable noncontrast MRA of the head. Motion artifact. No eyes, unremarkable noncontrast MRA of the neck. XR CHEST PORTABLE   Final Result   Pulmonary vascular congestion along with increased interstitial opacity,   suggesting interstitial edema. CT HEAD WO CONTRAST   Final Result   Small suspected lacunar infarct within the right paramedian miri. No large territory acute cortical infarct, hemorrhage or intracranial mass   effect. The findings were sent to the Radiology Results [address] at 2:56   pm on 3/9/2023 to be communicated to a licensed caregiver, received by   ALBERTO Gibbons at 2:59 pm.         VL Extremity Venous Right    (Results Pending)             Assessment/Plan :        1.  MARIA D   Improving     Has CKD   Maria D 2/2 urinary retention   Carmona +    Hold metolazone   Resume torsemide         2. CHF   Pulmonary congestion   Continue  torsemide     3. Anemia  Hb 10.7     Monitor     4. Acid- base disorder. Monitor     5.  Hyponatremia   2/2 hypervolemia   Improved         Recommend to dose adjust all medications  based on renal functions  Maintain SBP> 90 mmHg   Daily weights   AVOID NSAIDs  Avoid Nephrotoxins  Monitor Intake/Output  Call if significant decrease in urine output              D/w primary team      Thank you for allowing us to participate in care of [address] [place] [address]         Electronically signed by: Avelina Bryson MD, 3/12/2023, [facility]      Nephrology associates of [address] [place] [address]  Office : [phone]  Fax :[phone]

## 2023-03-12 NOTE — PLAN OF CARE
Problem: Discharge Planning  Goal: Discharge to home or other facility with appropriate resources  Outcome: Progressing  Flowsheets (Taken 3/11/2023 2233)  Discharge to home or other facility with appropriate resources:   Identify barriers to discharge with patient and caregiver   Arrange for needed discharge resources and transportation as appropriate   Identify discharge learning needs (meds, wound care, etc)   Refer to discharge planning if patient needs post-hospital services based on physician order or complex needs related to functional status, cognitive ability or social support system     Problem: Pain  Goal: Verbalizes/displays adequate comfort level or baseline comfort level  Outcome: Progressing  Flowsheets (Taken 3/10/2023 2302)  Verbalizes/displays adequate comfort level or baseline comfort level:   Encourage patient to monitor pain and request assistance   Assess pain using appropriate pain scale   Administer analgesics based on type and severity of pain and evaluate response  Note: Pt given scheduled OxyContin and PRN Roxicodone to control pain throughout shift. Problem: Skin/Tissue Integrity  Goal: Absence of new skin breakdown  Description: 1. Monitor for areas of redness and/or skin breakdown  2. Assess vascular access sites hourly  3. Every 4-6 hours minimum:  Change oxygen saturation probe site  4. Every 4-6 hours:  If on nasal continuous positive airway pressure, respiratory therapy assess nares and determine need for appliance change or resting period. Outcome: Progressing  Note: Pt able to turn but will call for assistance when needed. Offered repositioning with rounding. Problem: Safety - Adult  Goal: Free from fall injury  Outcome: Progressing  Flowsheets (Taken 3/12/2023 0139)  Free From Fall Injury: Instruct family/caregiver on patient safety  Note: Pt call out appropriately. Pt has an indwelling catheter. Pt ambulates with walker.      Problem: ABCDS Injury Assessment  Goal: Absence of physical injury  Outcome: Progressing  Flowsheets (Taken 3/12/2023 0139)  Absence of Physical Injury: Implement safety measures based on patient assessment  Note: Pt calls for assistance to the bathroom.       Problem: Cardiovascular - Adult  Goal: Maintains optimal cardiac output and hemodynamic stability  Outcome: Progressing  Flowsheets (Taken 3/11/2023 2233)  Maintains optimal cardiac output and hemodynamic stability:   Monitor blood pressure and heart rate   Monitor urine output and notify Licensed Independent Practitioner for values outside of normal range   Administer vasoactive medications as ordered     Problem: Skin/Tissue Integrity - Adult  Goal: Skin integrity remains intact  Outcome: Progressing  Flowsheets (Taken 3/11/2023 2233)  Skin Integrity Remains Intact: Monitor for areas of redness and/or skin breakdown     Problem: Skin/Tissue Integrity - Adult  Goal: Oral mucous membranes remain intact  Outcome: Progressing  Flowsheets (Taken 3/11/2023 2233)  Oral Mucous Membranes Remain Intact: Assess oral mucosa and hygiene practices     Problem: Musculoskeletal - Adult  Goal: Return mobility to safest level of function  Outcome: Progressing  Flowsheets (Taken 3/11/2023 2233)  Return Mobility to Safest Level of Function:   Assess patient stability and activity tolerance for standing, transferring and ambulating with or without assistive devices   Assist with transfers and ambulation using safe patient handling equipment as needed   Apply continuous passive motion per provider or physical therapy orders to increase flexion toward goal     Problem: Gastrointestinal - Adult  Goal: Minimal or absence of nausea and vomiting  Outcome: Progressing  Flowsheets  Taken 3/12/2023 0139  Minimal or absence of nausea and vomiting:   Administer ordered antiemetic medications as needed   Provide nonpharmacologic comfort measures as appropriate  Taken 3/11/2023 2233  Minimal or absence of nausea and vomiting: Administer ordered antiemetic medications as needed     Problem: Genitourinary - Adult  Goal: Absence of urinary retention  Outcome: Progressing  Flowsheets (Taken 3/10/2023 2053)  Absence of urinary retention:   Assess patients ability to void and empty bladder   Monitor intake/output and perform bladder scan as needed   Place urinary catheter per Licensed Independent Practitioner order if needed     Problem: Metabolic/Fluid and Electrolytes - Adult  Goal: Electrolytes maintained within normal limits  Outcome: Progressing  Flowsheets (Taken 3/11/2023 2233)  Electrolytes maintained within normal limits:   Monitor labs and assess patient for signs and symptoms of electrolyte imbalances   Administer electrolyte replacement as ordered     Problem: Metabolic/Fluid and Electrolytes - Adult  Goal: Hemodynamic stability and optimal renal function maintained  Outcome: Progressing  Flowsheets (Taken 3/11/2023 2233)  Hemodynamic stability and optimal renal function maintained:   Monitor labs and assess for signs and symptoms of volume excess or deficit   Monitor intake, output and patient weight   Monitor response to interventions for patient's volume status, including labs, urine output, blood pressure (other measures as available)     Problem: Metabolic/Fluid and Electrolytes - Adult  Goal: Glucose maintained within prescribed range  Outcome: Progressing  Flowsheets (Taken 3/11/2023 2233)  Glucose maintained within prescribed range:   Monitor blood glucose as ordered   Assess for signs and symptoms of hyperglycemia and hypoglycemia     Problem: Hematologic - Adult  Goal: Maintains hematologic stability  Outcome: Progressing  Flowsheets (Taken 3/11/2023 2233)  Maintains hematologic stability:   Assess for signs and symptoms of bleeding or hemorrhage   Monitor labs for bleeding or clotting disorders     Problem: Chronic Conditions and Co-morbidities  Goal: Patient's chronic conditions and co-morbidity symptoms are monitored and maintained or improved  Outcome: Progressing  Flowsheets (Taken 3/11/2023 2233)  Care Plan - Patient's Chronic Conditions and Co-Morbidity Symptoms are Monitored and Maintained or Improved:   Monitor and assess patient's chronic conditions and comorbid symptoms for stability, deterioration, or improvement   Collaborate with multidisciplinary team to address chronic and comorbid conditions and prevent exacerbation or deterioration   Update acute care plan with appropriate goals if chronic or comorbid symptoms are exacerbated and prevent overall improvement and discharge     Problem: Respiratory - Adult  Goal: Achieves optimal ventilation and oxygenation  Flowsheets (Taken 3/10/2023 2053)  Achieves optimal ventilation and oxygenation:   Assess for changes in respiratory status   Assess for changes in mentation and behavior   Position to facilitate oxygenation and minimize respiratory effort   Oxygen supplementation based on oxygen saturation or arterial blood gases   Respiratory therapy support as indicated     Problem: Neurosensory - Adult  Goal: Achieves stable or improved neurological status  Recent Flowsheet Documentation  Taken 3/11/2023 2233 by Yang Madrigal RN  Achieves stable or improved neurological status:   Assess for and report changes in neurological status   Monitor temperature, glucose, and sodium.  Initiate appropriate interventions as ordered

## 2023-03-12 NOTE — FLOWSHEET NOTE
03/12/23 0013   Pain Assessment   Pain Assessment 0-10   Pain Level 5   Patient's Stated Pain Goal 0 - No pain   Pain Location Head   Pain Descriptors Aching   Pt called out stating that she was still having pain and wanted to go to sleep. 5mg Roxicodone given for headache at 5/10. Pt FSBG was 84 at HS, pt refused her 50 units of Lantus at that time. Pt had a snack at HS also. Reassessment of her BG at 0030 showed her to be 263. Encouraged pt to now take the Lantus and explained that she now had to get 4 units of Humalog. Pt VU. Valium given per request. Pt now resting and appears to be comfortable. Will continue to monitor.   Brittany Samuels RN

## 2023-03-12 NOTE — PROGRESS NOTES
[facility]    3/11/2023 7:07 PM        Name: Xiomara Quach . Admitted: 3/9/2023  Primary Care Provider: Jennifer Alvarez DO (Tel: [phone])      Subjective: Letayue Sandro Pt admitted with renal failure and slurred speech .      Reviewed interval ancillary notes    Current Medications  torsemide (DEMADEX) tablet 20 mg, Daily  insulin lispro (HUMALOG) injection vial 15 Units, TID WC  insulin glargine (LANTUS) injection vial 50 Units, Nightly  linaclotide (LINZESS) capsule 145 mcg, QAM AC  sennosides-docusate sodium (SENOKOT-S) 8.6-50 MG tablet 2 tablet, BID  aspirin EC tablet 81 mg, Daily  benztropine (COGENTIN) tablet 1 mg, Nightly  butalbital-acetaminophen-caffeine (FIORICET, ESGIC) per tablet 1 tablet, Q6H PRN  diazePAM (VALIUM) tablet 5 mg, BID PRN  DULoxetine (CYMBALTA) extended release capsule 60 mg, BID  leflunomide (ARAVA) tablet 20 mg, Daily  levothyroxine (SYNTHROID) tablet 150 mcg, Daily  metoprolol tartrate (LOPRESSOR) tablet 25 mg, BID  montelukast (SINGULAIR) tablet 10 mg, Nightly  nitroGLYCERIN (NITROSTAT) SL tablet 0.4 mg, Q5 Min PRN  insulin lispro (HUMALOG) injection vial 0-16 Units, TID WC  insulin lispro (HUMALOG) injection vial 0-4 Units, Nightly  traZODone (DESYREL) tablet 50 mg, Nightly  ranolazine (RANEXA) extended release tablet 500 mg, BID  ondansetron (ZOFRAN-ODT) disintegrating tablet 4 mg, Q8H PRN   Or  ondansetron (ZOFRAN) injection 4 mg, Q6H PRN  polyethylene glycol (GLYCOLAX) packet 17 g, Daily PRN  perflutren lipid microspheres (DEFINITY) injection 1.5 mL, ONCE PRN  acetaminophen (TYLENOL) tablet 650 mg, Q4H PRN   Or  acetaminophen (TYLENOL) suppository 650 mg, Q4H PRN  atorvastatin (LIPITOR) tablet 80 mg, Nightly  labetalol (NORMODYNE;TRANDATE) injection 10 mg, Q10 Min PRN  clopidogrel (PLAVIX) tablet 75 mg, Daily  heparin (porcine) injection 5,000 Units, BID  dextrose bolus 10% 125 mL, PRN   Or  dextrose bolus 10% 250 mL, PRN  glucagon (rDNA) injection 1 mg, PRN  dextrose 10 % infusion, Continuous PRN  albuterol (PROVENTIL) nebulizer solution 2.5 mg, BID  albuterol (PROVENTIL) nebulizer solution 2.5 mg, Q4H PRN  oxyCODONE (OXYCONTIN) extended release tablet 10 mg, 2 times per day  meclizine (ANTIVERT) tablet 12.5 mg, TID PRN  oxyCODONE (ROXICODONE) immediate release tablet 5 mg, Q6H PRN        Objective:  /61   Pulse 66   Temp 97.7 °F (36.5 °C) (Oral)   Resp 17   Ht 5' 4\" (1.626 m)   Wt 199 lb 12.8 oz (90.6 kg)   SpO2 94%   BMI 34.30 kg/m²     Intake/Output Summary (Last 24 hours) at 3/11/2023 1907  Last data filed at 3/11/2023 1300  Gross per 24 hour   Intake 437 ml   Output 2100 ml   Net -1663 ml      Wt Readings from Last 3 Encounters:   03/11/23 199 lb 12.8 oz (90.6 kg)   01/26/23 202 lb (91.6 kg)   01/16/23 203 lb 3.2 oz (92.2 kg)       General appearance:  Appears comfortable  Eyes: Sclera clear. Pupils equal.  ENT: Moist oral mucosa. Trachea midline, no adenopathy. Cardiovascular: Regular rhythm, normal S1, S2. No murmur. No edema in lower extremities  Respiratory: Not using accessory muscles. Good inspiratory effort. Clear to auscultation bilaterally, no wheeze or crackles. GI: Abdomen soft, no tenderness, not distended, normal bowel sounds  Musculoskeletal: No cyanosis in digits, neck supple  Neurology: CN 2-12 grossly intact. N+Ve slurred speech  Psych: Normal affect.  Alert and oriented in time, place and person  Skin: Warm, dry, normal turgor    Labs and Tests:  CBC:   Recent Labs     03/09/23  1544 03/10/23  0200 03/11/23  0725   WBC 10.7 8.2 9.0   HGB 11.6* 10.7* 11.1*    267 287     BMP:    Recent Labs     03/09/23  1544 03/11/23  0723   * 140   K 4.6 4.1   CL 84* 97*   CO2 32 36*   BUN 68* 48*   CREATININE 2.3* 1.4*   GLUCOSE 192* 167*     Hepatic:   Recent Labs     03/09/23  1544 03/11/23  0723   AST 84* 24   ALT 22 14   BILITOT <0.2 <0.2 ALKPHOS 106 94           Problem List  Principal Problem:    Acute CVA (cerebrovascular accident) ([facility]  Active Problems:    Encephalopathy, metabolic    Elevated troponin    Acute renal failure superimposed on chronic kidney disease ([facility]    Hyponatremia  Resolved Problems:    * No resolved hospital problems. *       Assessment & Plan: MARIA D with CKD with urinary retention  Discussed with nephrology and  For urinary retention we did place a Carmona catheter  Monitor renal functions    Neurology consulted for consideration of possible stroke  Patient is already on aspirin Plavix and high intensity statin    Patient is on significantly high amount of Premeal insulin along with long-acting insulin. We will start Lantus and Premeal insulin at a lower dose. Diet: ADULT DIET; Dysphagia - Soft and Bite Sized; 5 carb choices (75 gm/meal);  Low Fat/Low Chol/High Fiber/2 gm Na  Code:Full Code  DVT PPX      Tonya Denton MD   3/11/2023 7:07 PM

## 2023-03-13 ENCOUNTER — TELEPHONE (OUTPATIENT)
Dept: CARDIOLOGY CLINIC | Age: 53
End: 2023-03-13

## 2023-03-13 VITALS
DIASTOLIC BLOOD PRESSURE: 68 MMHG | WEIGHT: 187 LBS | BODY MASS INDEX: 31.92 KG/M2 | HEIGHT: 64 IN | OXYGEN SATURATION: 96 % | TEMPERATURE: 98.3 F | HEART RATE: 74 BPM | RESPIRATION RATE: 19 BRPM | SYSTOLIC BLOOD PRESSURE: 128 MMHG

## 2023-03-13 LAB
A/G RATIO: 0.9 (ref 1.1–2.2)
ALBUMIN SERPL-MCNC: 3.7 G/DL (ref 3.4–5)
ALP BLD-CCNC: 111 U/L (ref 40–129)
ALT SERPL-CCNC: 11 U/L (ref 10–40)
ANION GAP SERPL CALCULATED.3IONS-SCNC: 6 MMOL/L (ref 3–16)
AST SERPL-CCNC: 16 U/L (ref 15–37)
BILIRUB SERPL-MCNC: <0.2 MG/DL (ref 0–1)
BUN BLDV-MCNC: 29 MG/DL (ref 7–20)
CALCIUM SERPL-MCNC: 9.7 MG/DL (ref 8.3–10.6)
CHLORIDE BLD-SCNC: 101 MMOL/L (ref 99–110)
CO2: 35 MMOL/L (ref 21–32)
CREAT SERPL-MCNC: 1.1 MG/DL (ref 0.6–1.1)
GFR SERPL CREATININE-BSD FRML MDRD: 60 ML/MIN/{1.73_M2}
GLUCOSE BLD-MCNC: 125 MG/DL (ref 70–99)
GLUCOSE BLD-MCNC: 127 MG/DL (ref 70–99)
GLUCOSE BLD-MCNC: 233 MG/DL (ref 70–99)
HCT VFR BLD CALC: 35.5 % (ref 36–48)
HEMOGLOBIN: 11.4 G/DL (ref 12–16)
LV EF: 58 %
LVEF MODALITY: NORMAL
MCH RBC QN AUTO: 28.6 PG (ref 26–34)
MCHC RBC AUTO-ENTMCNC: 32.2 G/DL (ref 31–36)
MCV RBC AUTO: 88.8 FL (ref 80–100)
PDW BLD-RTO: 13.6 % (ref 12.4–15.4)
PERFORMED ON: ABNORMAL
PERFORMED ON: ABNORMAL
PLATELET # BLD: 310 K/UL (ref 135–450)
PMV BLD AUTO: 9.2 FL (ref 5–10.5)
POTASSIUM REFLEX MAGNESIUM: 4.6 MMOL/L (ref 3.5–5.1)
RBC # BLD: 3.99 M/UL (ref 4–5.2)
SODIUM BLD-SCNC: 142 MMOL/L (ref 136–145)
TOTAL PROTEIN: 7.6 G/DL (ref 6.4–8.2)
WBC # BLD: 13.6 K/UL (ref 4–11)

## 2023-03-13 PROCEDURE — 85027 COMPLETE CBC AUTOMATED: CPT

## 2023-03-13 PROCEDURE — 6370000000 HC RX 637 (ALT 250 FOR IP): Performed by: NURSE PRACTITIONER

## 2023-03-13 PROCEDURE — 6360000002 HC RX W HCPCS: Performed by: INTERNAL MEDICINE

## 2023-03-13 PROCEDURE — 6370000000 HC RX 637 (ALT 250 FOR IP): Performed by: INTERNAL MEDICINE

## 2023-03-13 PROCEDURE — 93971 EXTREMITY STUDY: CPT

## 2023-03-13 PROCEDURE — 97535 SELF CARE MNGMENT TRAINING: CPT

## 2023-03-13 PROCEDURE — 80053 COMPREHEN METABOLIC PANEL: CPT

## 2023-03-13 PROCEDURE — 99233 SBSQ HOSP IP/OBS HIGH 50: CPT | Performed by: INTERNAL MEDICINE

## 2023-03-13 PROCEDURE — 92526 ORAL FUNCTION THERAPY: CPT

## 2023-03-13 PROCEDURE — 97116 GAIT TRAINING THERAPY: CPT

## 2023-03-13 PROCEDURE — 51798 US URINE CAPACITY MEASURE: CPT

## 2023-03-13 PROCEDURE — 94640 AIRWAY INHALATION TREATMENT: CPT

## 2023-03-13 PROCEDURE — 93306 TTE W/DOPPLER COMPLETE: CPT

## 2023-03-13 PROCEDURE — 94761 N-INVAS EAR/PLS OXIMETRY MLT: CPT

## 2023-03-13 RX ORDER — TAMSULOSIN HYDROCHLORIDE 0.4 MG/1
0.4 CAPSULE ORAL DAILY
Status: DISCONTINUED | OUTPATIENT
Start: 2023-03-13 | End: 2023-03-13 | Stop reason: HOSPADM

## 2023-03-13 RX ORDER — CLOPIDOGREL BISULFATE 75 MG/1
75 TABLET ORAL DAILY
Qty: 30 TABLET | Refills: 0 | Status: SHIPPED | OUTPATIENT
Start: 2023-03-14

## 2023-03-13 RX ORDER — TAMSULOSIN HYDROCHLORIDE 0.4 MG/1
0.4 CAPSULE ORAL DAILY
Qty: 30 CAPSULE | Refills: 0 | Status: SHIPPED | OUTPATIENT
Start: 2023-03-13

## 2023-03-13 RX ORDER — TORSEMIDE 20 MG/1
20 TABLET ORAL DAILY
Qty: 30 TABLET | Refills: 0 | Status: SHIPPED | OUTPATIENT
Start: 2023-03-14 | End: 2023-03-15

## 2023-03-13 RX ADMIN — INSULIN LISPRO 15 UNITS: 100 INJECTION, SOLUTION INTRAVENOUS; SUBCUTANEOUS at 08:55

## 2023-03-13 RX ADMIN — METOPROLOL TARTRATE 25 MG: 25 TABLET, FILM COATED ORAL at 08:54

## 2023-03-13 RX ADMIN — LEVOTHYROXINE SODIUM 150 MCG: 150 TABLET ORAL at 08:54

## 2023-03-13 RX ADMIN — DULOXETINE HYDROCHLORIDE 60 MG: 60 CAPSULE, DELAYED RELEASE ORAL at 08:54

## 2023-03-13 RX ADMIN — HEPARIN SODIUM 5000 UNITS: 5000 INJECTION INTRAVENOUS; SUBCUTANEOUS at 08:54

## 2023-03-13 RX ADMIN — TAMSULOSIN HYDROCHLORIDE 0.4 MG: 0.4 CAPSULE ORAL at 12:47

## 2023-03-13 RX ADMIN — STANDARDIZED SENNA CONCENTRATE AND DOCUSATE SODIUM 2 TABLET: 8.6; 5 TABLET ORAL at 08:54

## 2023-03-13 RX ADMIN — RANOLAZINE 500 MG: 500 TABLET, EXTENDED RELEASE ORAL at 08:54

## 2023-03-13 RX ADMIN — LEFLUNOMIDE 20 MG: 20 TABLET ORAL at 08:54

## 2023-03-13 RX ADMIN — INSULIN LISPRO 15 UNITS: 100 INJECTION, SOLUTION INTRAVENOUS; SUBCUTANEOUS at 12:47

## 2023-03-13 RX ADMIN — ASPIRIN 81 MG: 81 TABLET, COATED ORAL at 08:54

## 2023-03-13 RX ADMIN — LINACLOTIDE 145 MCG: 145 CAPSULE, GELATIN COATED ORAL at 08:55

## 2023-03-13 RX ADMIN — ALBUTEROL SULFATE 2.5 MG: 2.5 SOLUTION RESPIRATORY (INHALATION) at 09:14

## 2023-03-13 RX ADMIN — OXYCODONE HYDROCHLORIDE 10 MG: 10 TABLET, FILM COATED, EXTENDED RELEASE ORAL at 08:54

## 2023-03-13 RX ADMIN — CLOPIDOGREL BISULFATE 75 MG: 75 TABLET ORAL at 08:54

## 2023-03-13 RX ADMIN — TORSEMIDE 20 MG: 20 TABLET ORAL at 08:54

## 2023-03-13 ASSESSMENT — PAIN - FUNCTIONAL ASSESSMENT: PAIN_FUNCTIONAL_ASSESSMENT: ACTIVITIES ARE NOT PREVENTED

## 2023-03-13 ASSESSMENT — PAIN DESCRIPTION - DESCRIPTORS: DESCRIPTORS: ACHING;THROBBING

## 2023-03-13 ASSESSMENT — PAIN DESCRIPTION - LOCATION: LOCATION: HEAD

## 2023-03-13 ASSESSMENT — PAIN SCALES - WONG BAKER
WONGBAKER_NUMERICALRESPONSE: 0
WONGBAKER_NUMERICALRESPONSE: 0

## 2023-03-13 ASSESSMENT — PAIN SCALES - GENERAL: PAINLEVEL_OUTOF10: 7

## 2023-03-13 ASSESSMENT — PAIN DESCRIPTION - PAIN TYPE: TYPE: ACUTE PAIN

## 2023-03-13 NOTE — PLAN OF CARE
Problem: Discharge Planning  Goal: Discharge to home or other facility with appropriate resources  Outcome: Progressing  Flowsheets (Taken 3/13/2023 0148)  Discharge to home or other facility with appropriate resources:   Identify barriers to discharge with patient and caregiver   Arrange for needed discharge resources and transportation as appropriate   Arrange for interpreters to assist at discharge as needed   Refer to discharge planning if patient needs post-hospital services based on physician order or complex needs related to functional status, cognitive ability or social support system     Problem: Pain  Goal: Verbalizes/displays adequate comfort level or baseline comfort level  Outcome: Progressing  Flowsheets  Taken 3/13/2023 0148  Verbalizes/displays adequate comfort level or baseline comfort level:   Encourage patient to monitor pain and request assistance   Assess pain using appropriate pain scale   Administer analgesics based on type and severity of pain and evaluate response  Taken 3/12/2023 1930  Verbalizes/displays adequate comfort level or baseline comfort level:   Assess pain using appropriate pain scale   Administer analgesics based on type and severity of pain and evaluate response   Encourage patient to monitor pain and request assistance  Note: Scheduled and prn medications given throughout shift. Problem: Safety - Adult  Goal: Free from fall injury  Outcome: Progressing  Flowsheets (Taken 3/12/2023 0139)  Free From Fall Injury: Instruct family/caregiver on patient safety  Note: Pt calls out to go to bathroom for BM otherwise pt has indwelling catheter.      Problem: Respiratory - Adult  Goal: Achieves optimal ventilation and oxygenation  Outcome: Progressing  Flowsheets (Taken 3/13/2023 0148)  Achieves optimal ventilation and oxygenation:   Assess for changes in respiratory status   Assess for changes in mentation and behavior   Assess and instruct to report shortness of breath or any respiratory difficulty   Position to facilitate oxygenation and minimize respiratory effort   Oxygen supplementation based on oxygen saturation or arterial blood gases

## 2023-03-13 NOTE — CARE COORDINATION
Received message from Briseida with [facility] and he declined pt d/t PT has no availability in their schedule and cannot use a contracted therapist for medicaid.     Tasha Hamilton, RN, BSN  [phone]

## 2023-03-13 NOTE — TELEPHONE ENCOUNTER
Angie Griggs,  called to get clarification on the Pt's med. Pt was in the Hospital on 03/09 , released on 03/13. The Hospital [facility] changed all of her med. Jorge is wanting to know the mg on Torsemide and spirolactone. Please call to discuss.   Please advise

## 2023-03-13 NOTE — CARE COORDINATION
Supriya received a referral to this patient for a rolling walker. Rolling walker has been delivered to the patient's room. Thank you for the referral.  Electronically signed by Dianna Loja on 3/13/2023 at 3:19 PM  Cell ph# [phone]    NOTE: After 5:00 pm, Weekends, Holidays: Call Angela/Supriya On-[facility] at [phone] to coordinate delivery of home medical equipment.

## 2023-03-13 NOTE — PROGRESS NOTES
Facility/Department: [address]  Speech Language Pathology   Dysphagia Treatment Note    Patient: Juan Davis   : 1970   MRN: [id number]      Evaluation Date: 3/13/2023      Admitting Dx: Lendia Null speech [R47.81]  Hyponatremia [E87.1]  Left-sided weakness [R53.1]  Elevated troponin [R77.8]  Closed head injury, initial encounter [S09.90XA]  Acute CVA (cerebrovascular accident) [facility]) [I63.9]  Acute renal failure superimposed on chronic kidney disease, unspecified CKD stage, unspecified acute renal failure type ([facility] [N17.9, N18.9]  Treatment Diagnosis: Oropharyngeal Dysphagia   Pain: Denies                                              Diet and Treatment Recommendations 3/13/2023:  Diet Solids Recommendation:  Regular texture diet  Liquid Consistency Recommendation: Thin liquids  Recommended form of Meds: Meds whole with water or Meds in puree         Compensatory strategies:   Upright as possible with all PO intake , Small bites/sips , Swallow 2 times per bite , Eat/feed slowly, Remain upright 30-45 min     Assessment of Texture Tolerance:  Diet level prior to treatment: Dysphagia III Soft and bite sized , Thin liquids   Tolerance of Current Diet Level: Other: RN reported that pt was reporting difficulty with some of the chopped up solids, reporting they were too dry and she felt like foods were getting stuck in her throat. Impressions: Pt was positioned Upright in bed , awake and alert. Currently on room air. Trials of thin liquids and regular solids  were provided to assess swallow function.  present in room and assisted with translation as needed. He reported that pt was unable to chew and swallow chopped turkey sausage and diced potatoes but tolerated scrambled eggs with no overt difficulty. Pt reports that at baseline she has difficulty with hard solids, rice and other variety of foods but can typically choose soft foods or soups to consume.  Often utilizes liquids to assist with clearing solids. With self feeding of solids and thin liquids pt demonstrated prolonged/disorganized mastication, delayed oral clearing, suspected pharyngeal pooling and suspected delayed pharyngeal clearing. No immediate overt clinical s/s of aspiration/penetration were assessed this date. Pt demonstrates increased risk for aspiration due to co morbidities , deconditioning , and prior hx of dysphagia . At this time recommend advance to Regular texture diet  with Thin liquids , Meds whole with water or Meds in puree  as it appears pt may be close to her baseline for dysphagia. Would recommend ongoing assessment of swallow function to ensure diet tolerance and education. Dysphagia Goals:   Pt will functionally tolerate recommended diet with no overt clinical s/s of aspiration (Ongoing 03/13/23)  Pt will demonstrate understanding of aspiration risk and precautions via education/demonstration with occasional prompting (Ongoing 03/13/23)  Pt will advance to least restrictive diet as indicated (Ongoing 03/13/23)  If clinical s/s of aspiration/penetration continue to be noted, Pt will participate in Modified Barium Swallow Study (Ongoing 03/13/23)    Plan:   3-5 times per week during acute care stay. Patient/Family Education:  Provided education regarding role of SLP, recommendations and general speech pathology plan of care. [x] Pt verbalized understanding and agreement   [x] Pt requires ongoing learning   [] No evidence of comprehension     Discharge Recommendations:    Recommend ongoing SLP for dysphagia therapy upon discharge from hospital     Treatment time:  Timed Code Treatment Minutes: 0 minutes   Total Treatment time: 15 minutes     If patient discharges prior to next session this note will serve as a discharge summary.      Signature: Klaus Clayton, [address]  Speech Language Pathologist

## 2023-03-13 NOTE — DISCHARGE INSTR - COC
Continuity of Care Form    Patient Name: Johnathan Amezcua   :  1970  MRN:  [id number]    Admit date:  3/9/2023  Discharge date:  3/13/23    Code Status Order: Full Code   Advance Directives:     Admitting Physician:  Samra Manzo MD  PCP: Polo Gallagher DO    Discharging Nurse: [facility] Unit/Room#: [id number]  Discharging Unit Phone Number: [phone]    Emergency Contact:   Extended Emergency Contact Information  Primary Emergency Contact: Liat Maldonado  Address: [address] [place] [address], [address] [place] [address] Phone: [phone]  Relation: Spouse  Secondary Emergency Contact: [address] [place] Phone: [phone]  Relation: Child  Preferred language: English   needed?  No    Past Surgical History:  Past Surgical History:   Procedure Laterality Date    BREAST SURGERY      left mastectomy    CARPAL TUNNEL RELEASE      Bilateral    FINGER CONTRACTURE SURGERY      HYSTERECTOMY (CERVIX STATUS UNKNOWN)      USO    MASTECTOMY      TONSILLECTOMY      UPPER GASTROINTESTINAL ENDOSCOPY  2019    stretched esophagous        Immunization History:   Immunization History   Administered Date(s) Administered    COVID-19, MODERNA BLUE border, Primary or Immunocompromised, (age 12y+), IM, 100 mcg/0.5mL 2021, 2021, 2021    Influenza Vaccine, unspecified formulation 10/25/2010, 2011, 2012, 2014    Influenza Virus Vaccine 10/25/2010, 2011, 2012, 2014, 2016    Influenza, AFLURIA (age 1 yrs+), FLUZONE, (age 10 mo+), MDV, 0.5mL 10/07/2015    Influenza, FLUARIX, FLULAVAL, FLUZONE (age 10 mo+) AND AFLURIA, (age 1 y+), PF, 0.5mL 2016, 10/21/2019, 2021    Influenza, FLUCELVAX, (age 10 mo+), MDCK, PF, 0.5mL 10/26/2022    Pneumococcal Polysaccharide ([id number]) 2016    Td, unspecified formulation 2010    Tdap (Boostrix, Adacel) 10/07/2021    Zoster Recombinant (Shingrix) 11/10/2021 Active Problems:  Patient Active Problem List   Diagnosis Code    Fibromyalgia M79.7    Iron deficiency anemia D50.9    History of breast cancer Z85. 3    Chronic pain G89.29    Lymphedema of upper extremity following lymphadenectomy E89.89, I89.0    Encounter for monitoring aromatase inhibitor therapy Z51.81, Z79.811    Encounter for follow-up surveillance of breast cancer Z08, Z85.3    Hyperlipidemia LDL goal <70 E78.5    Essential hypertension I10    Poorly controlled type 2 diabetes mellitus ([facility] E11.65    Asthma J45.909    Hypothyroidism E03.9    Anxiety and depression F41.9, F32. A    Hx of breast cancer Z85.3    SOB (shortness of breath) R06.02    Hiatal hernia with GERD K44.9, K21.9    Obstructive sleep apnea syndrome G47.33    Coronary artery disease involving native coronary artery of native heart without angina pectoris I25.10    Severe persistent asthma without complication U70.32    Diabetic polyneuropathy associated with type 2 diabetes mellitus (HCC) E11.42    Chronic congestive heart failure (HCC) I50.9    Chronic heart failure with preserved ejection fraction (HCC) I50.32    Coronary artery disease due to lipid rich plaque I25.10, I25.83    Renal insufficiency N28.9    Seronegative rheumatoid arthritis (HCC) M06.00    Acute renal failure superimposed on chronic kidney disease (HCC) N17.9, N18.9    Hyponatremia E87.1    History of nephrolithiasis Z87.442    Immigrant with language difficulty Z60.3    Morbidly obese ([facility] E66.01    H/O TIA (transient ischemic attack) and stroke Z86.73    Need for isolation Z41.8    Encounter for medication counseling Z71.89    Urinary hesitancy R39.11    Weight loss counseling, encounter for Z71.3    Complex care coordination Z71.89    Oropharyngeal dysphagia R13.12    Constipation due to pain medication K59.03    Mouth pain K13.79    Environmental and seasonal allergies J30.89    Hypersomnolence G47.10    Jaw pain R68.84    Pain in both lower extremities M79.604, M79.605    Chronic daily headache R51.9    Vertigo R42    Fatigue R53.83    Leg edema, right R60.0    Hot flashes R23.2    Excessive sweating R61    Tension type headache G44.209    Inattention R41.840    Severe episode of recurrent major depressive disorder (HCC) F33.2    Alopecia L65.9    Canker sore K12.0    Seborrhea capitis in adult L21.0    Intrahepatic bile duct dilation K83.8    Esophageal dysphagia R13.19    Weakness of right lower extremity R29.898    DDD (degenerative disc disease), cervical M50.30    DDD (degenerative disc disease), lumbar M51.36    Left lower quadrant abdominal wall mass R19.04    Neck sprain S13. 9XXA    Vaccine counseling Z71.85    Acute gout of right ankle M10.9    Lymphedema I89.0    OAB (overactive bladder) N32.81    Tremor R25.1    Dog bite W54. 0XXA    Acute CVA (cerebrovascular accident) ([address] I63.9    Encephalopathy, metabolic F87.43    Elevated troponin R77.8       Isolation/Infection:   Isolation            No Isolation          Patient Infection Status       Infection Onset Added Last Indicated Last Indicated By Review Planned Expiration Resolved Resolved By    None active    Resolved    MDRO (multi-drug resistant organism) 07/13/21 07/15/21 07/13/21 Culture, Urine   03/10/23 Jay Mendoza RN    C-diff Rule Out 10/28/20 10/28/20 10/28/20 Clostridium difficile toxin/antigen (Ordered)   10/31/20 Ilana Kyle RN    COVID-19 (Rule Out) 10/28/20 10/28/20 10/28/20 COVID-19 (Ordered)   10/29/20 Rule-Out Test Resulted    COVID-19 (Rule Out) 03/25/20 03/25/20 03/25/20 COVID-19 (Ordered)   03/27/20 Rule-Out Test Resulted    MDRO (multi-drug resistant organism) 03/13/19 03/15/19 03/13/19 Urine Culture   10/30/20 Eloise Mercado RN    ESBL (Extended Spectrum Beta Lactamase)  03/16/18 03/13/19 Urine Culture   10/30/20 Eloise Mercado RN    3/14/18; urine            Nurse Assessment:  Last Vital Signs: /68   Pulse 74   Temp 98.3 °F (36.8 °C) (Oral)   Resp 19   Ht 5' 4\" (1.626 m)   Wt 187 lb (84.8 kg)   SpO2 96%   BMI 32.10 kg/m²     Last documented pain score (0-10 scale): Pain Level: 7  Last Weight:   Wt Readings from Last 1 Encounters:   03/12/23 187 lb (84.8 kg)     Mental Status:  oriented and alert    IV Access:  - None    Nursing Mobility/ADLs:  Walking   Assisted  Transfer  Assisted  Bathing  Assisted  Dressing  Assisted  Toileting  Independent  Feeding  Independent  Med Admin  Assisted  Med Delivery   whole with apple sauce    Wound Care Documentation and Therapy:        Elimination:  Continence: Bowel: Yes  Bladder: Yes  Urinary Catheter: Insertion Date: 3/10/23 and Removal Date 3/13/23    Colostomy/Ileostomy/Ileal Conduit: No       Date of Last BM: 3/12/2023    Intake/Output Summary (Last 24 hours) at 3/13/2023 1230  Last data filed at 3/13/2023 1055  Gross per 24 hour   Intake 450 ml   Output 2100 ml   Net -1650 ml     I/O last 3 completed shifts: In: 950 [P.O.:950]  Out: [address] [Urine:3525]    Safety Concerns: At Risk for Falls    Impairments/Disabilities:      Language Barrier - Telugu    Nutrition Therapy:  Current Nutrition Therapy:   - Oral Diet:  Carb Control 4 carbs/meal (1800kcals/day), Low Fat, and Low Sodium (2gm)    Routes of Feeding: Oral  Liquids: No Restrictions  Daily Fluid Restriction: no  Last Modified Barium Swallow with Video (Video Swallowing Test): not done    Treatments at the Time of Hospital Discharge:   Respiratory Treatments: none  Oxygen Therapy:  is not on home oxygen therapy.   Ventilator:    - No ventilator support    Rehab Therapies: Physical Therapy and Occupational Therapy  Weight Bearing Status/Restrictions: No weight bearing restrictions  Other Medical Equipment (for information only, NOT a DME order):  walker  Other Treatments: none    Patient's personal belongings (please select all that are sent with patient):  Packed by patient    RN SIGNATURE:  Electronically signed by Hilda Alcazar RN on 3/13/23 at 2:40 PM EDT    CASE MANAGEMENT/SOCIAL WORK SECTION    Inpatient Status Date: 3/9/2023    Readmission Risk Assessment Score:  Readmission Risk              Risk of Unplanned Readmission:  36           Discharging to Facility/ [place] [id number] [facility]  Phone [phone]  Fax [phone]     Dialysis Facility (if applicable)   Name:  Address:  Dialysis Schedule:  Phone:  Fax:    / signature: Juan Gonzalez RN, BSN  [phone]     PHYSICIAN SECTION    Prognosis: Fair    Condition at Discharge: Stable    Rehab Potential (if transferring to Rehab): Fair    Recommended Labs or Other Treatments After Discharge:     Physician Certification: I certify the above information and transfer of [facility]  is necessary for the continuing treatment of the diagnosis listed and that she requires Home Care for less 30 days.      Update Admission H&P: No change in H&P    PHYSICIAN SIGNATURE:  Electronically signed by Tru Pantoja MD on 3/13/23 at 12:30 PM EDT

## 2023-03-13 NOTE — PROGRESS NOTES
Data- discharge order received, pt verbalized agreement to discharge, needs for [address] with [facility] for PT/OT and/or new Durable Medical Equipment through [facility] for [employer] [facility], [address] [place] [address] reviewed and signed by MD, to be completed by RN. Action- AVS prepared, discharge instructions prepared and given to pt and , medication information packet given r/t NEW or CHANGED prescriptions, pt verbalized understanding further self-review. D/C instruction summary: Diet- carb control cardiac, Activity- as tolerated, follow up with Primary Care Physician [facility], [address] [place] [address] appointment in 1 week, immunizations reviewed, medications prescriptions to be filled at [facility] outpatient pharmacy and picked up by  prior to discharge. Contact information provided to above agencies used. Response- Case Management/ reported faxing completed ANILA and AVS to needed HHC/DME services stated above. Pt belongings gathered, IV removed, pt dressed by physical therapist. Disposition is home with HHC/DME as stated above, transported with , taken to lobby via w/c with aide, no complications. 1. WEIGHT: Admit Weight: 202 lb (91.6 kg) (03/09/23 1429)        Today  Weight: 187 lb (84.8 kg) (03/12/23 0431)       2.  O2 SAT.: SpO2: 96 % (03/13/23 0915)

## 2023-03-13 NOTE — PROGRESS NOTES
[facility] [address]   Phone: [phone]    Physical Therapy                [x] Daily Treatment Note         [] Discharge Summary      Patient: Matteo Rae   : 1970   MRN: [id number]   Date of Service:  3/13/2023  Admitting Diagnosis: Acute CVA (cerebrovascular accident) [facility])  Current Admission Summary: Matteo Rae is a 47 y.o. female who presents to the emergency department stating that she has been feeling intermittently dizzy and shaky for several days. Yesterday evening, she went to the kitchen to make her cell something to eat and forgot to wear her oxygen.  states that sometimes when she forgets to wear her oxygen, she gets increasingly lightheaded and shaky. She pressed on the bottom of the trash can to open the lid and became dizzy and fell over and struck her head on the ground. There was no loss of consciousness. Patient yelled out for her  in the next room. He helped her up and states that since then she has been feeling extremely dizzy, weak, shaky. Her left arm and leg are weaker than her right since she hit her head. In review of her chart, she does have history of TIA with left-sided weakness. She wears 3 L of oxygen at all time. She has history of breast cancer in remission. She has chronic lymphedema in the left upper extremity and right lower extremity. She is able to speak some [place] but  does help with translation and secondary to her [employer], she prefers not to use an . Updated 3/13: MRI Brain:   Impression:        No acute intracranial abnormality. Old lacunar infarct in the right central miri. Mild parenchymal volume loss. Minimal chronic microvascular disease.     MARIA D, urinary retention, CHF     Past Medical History:  has a past medical history of Anxiety, Anxiety and depression, Arthritis, Asthma, CAD (coronary artery disease), Cancer ([facility] [address] Cerebral artery occlusion with cerebral infarction [facility]), CHF (congestive heart failure) ([facility] [address] Chronic kidney disease, Chronic pain, Constipation, COPD (chronic obstructive pulmonary disease) ([facility] [address] Depression, Diabetes mellitus ([facility] Diabetic polyneuropathy associated with type 2 diabetes mellitus ([facility] Dysthymia, ESBL (extended spectrum beta-lactamase) producing bacteria infection, Fibromyalgia, Gastric ulcer, unspecified as acute or chronic, without mention of hemorrhage, perforation, or obstruction, GERD (gastroesophageal reflux disease), Gout, HIGH CHOLESTEROL, Hypertension, Hypothyroidism, Pneumonia, Pyelonephritis, Severe persistent asthma without complication, Thyroid disease, and TIA (transient ischemic attack). Past Surgical History:  has a past surgical history that includes Breast surgery; Hysterectomy (2005); Carpal tunnel release; Tonsillectomy; Finger contracture surgery; Upper gastrointestinal endoscopy (2019); and Mastectomy. Discharge Recommendations:  Nieves Rivero scored a 20/24 on the AM-PAC short mobility form. Current research shows that an AM-PAC score of 18 or greater is typically associated with a discharge to the patient's home setting. Based on the patient's AM-PAC score and their current functional mobility deficits, it is recommended that the patient have 2-3 sessions per week of Physical Therapy at d/c to increase the patient's independence. At this time, this patient demonstrates the endurance and safety to discharge home with home PT and a follow up treatment frequency of 2-3x/wk. Please see assessment section for further patient specific details.     PT recommends initial 24 hr (A)/(S) upon d/c    HOME HEALTH CARE: LEVEL 1 STANDARD    - Initial home health evaluation to occur within 24-48 hours, in patient home   - Therapy to evaluate with goal of regaining prior level of functioning   - Therapy to evaluate if patient has [address] needs for personal care    If patient discharges prior to next session this note will serve as a discharge summary. Please see below for the latest assessment towards goals. DME Required For Discharge: rolling walker  Precautions/Restrictions: high fall risk, up as tolerated, strict I&O, adult diet - regular, 5 carb choices, low fat/low chol/high fiber/2gm Na  Weight Bearing Restrictions: no restrictions  Required Braces/Orthotics: no braces required  Positional Restrictions:no positional restrictions    Pre-Admission Information from Initial Evaluation:   Lives With: spouse, son                      Type of Home: house  Home Layout: one level  Home Access: level entry  Bathroom Layout: tub/shower unit, walk in shower, pt uses tub/shower  Bathroom Equipment: grab bars in shower  Toilet Height: standard height  Home Equipment: single point cane  Transfer Assistance: Independent without use of device  Ambulation Assistance:Independent without use of device  ADL Assistance: requires assistance with bathing, IND with dressing, toileting, grooming  IADL Assistance: independent with homemaking tasks, family assists as well  Active :        [] Yes                 [x] No  Hand Dominance: [] Left                 [x] Right  Recent Falls: pt reports 2 recent falls in last 30 days      Subjective  General:  Patient supine in bed upon arrival - agreeable to PT. Patient refuses use of , understands simple english with gestures, following all commands without (A).    present during session to (A) and interpret as needed per patient request.  Pain: 0/10 denies pain  Pain Interventions: not applicable     Functional Mobility  Bed Mobility  Supine to Sit: modified independent with elevated head of bed   Scooting: Independent seated at edge of bed   Transfers  Sit to stand transfer: supervision without an assistive device   Stand to sit transfer: supervision without an assistive device  Stand step transfer: SBA without an assistive device   Comments: modified (I) sit <-> stand with RW; modified (I) stand step with RW  Ambulation  Surface:level surface  Assistive Device: rolling walker  Assistance: supervision   Comments:  300ft - no loss of balance; slow roz  2nd Trial with no assistive device and CGA x 300 ft - slow roz, increased medial lateral sway, decreased (B) step length and foot clearance     Balance  Static Sitting Balance: good: independent with functional balance in unsupported position  Dynamic Sitting Balance: good: independent with functional balance in unsupported position  Static Standing Balance: fair (+): maintains balance at SBA/supervision without use of UE support  Dynamic Standing Balance: fair: maintains balance at CGA without use of UE support    Functional Outcomes  AM-PAC Inpatient Mobility Raw Score : 20              Cognition  WFL  Comment: following commands throughout session - good safety awareness during functional mobility  Orientation:    alert and oriented x 4  Command Following:   [facility]    Education  Barriers To Learning: none - requires increased time or gestures related to language   Patient Education: patient educated on goals, PT role and benefits, plan of care, general safety, functional mobility training, transfer training, discharge recommendations, use of call light, PT recommendations  Learning Assessment:  patient verbalizes understanding, would benefit from continued reinforcement    Assessment  Activity Tolerance: none - eager to ambulate and perform mobility   Impairments Requiring Therapeutic Intervention: decreased functional mobility, decreased strength, decreased endurance, decreased sensation, decreased balance, increased pain  Prognosis: good  Clinical Assessment:  Patient demonstrates significantly improved functional mobility this date, ambulating 300ft with RW and (S) and additional 300ft without RW and CGA. Improved overall steadiness, roz, and safety noted with use of RW.   Patient planning to return home with  to (A) PRN. Patient is typically (I) in home without use of an assistive device, reports uses cane rarely. Patient will continue to benefit from additional skilled PT intervention to facilitate safe mobility and to optimize (I) to promote return to prior level of function. Safety Interventions: patient left in chair, chair alarm in place, call light within reach, gait belt, nurse notified, and family/caregiver present    Plan  Frequency: 5-7 x/week  Current Treatment Recommendations: strengthening, balance training, functional mobility training, transfer training, gait training, endurance training, patient/caregiver education, home exercise program, safety education, and equipment evaluation/education    Goals  Patient Goals: \"to go home soon\"  Short Term Goals:  Time Frame: By discharge  Patient will complete bed mobility at minimal assistance - goal met 3/13; updated: Patient will demonstrate (I) bed mobility    Patient will complete transfers at stand by assistance - goal met 3/13; updated: Patient will demonstrate (I) sit <-> stand    Updated 3/13: Patient will demonstrate (I) stand pivot     Patient will ambulate 50 ft with use of LRAD at minimal assistance - goal met 3/13; updated: Patient will ambulate >/= 350ft (I)     Therapy Session Time      Individual Group Co-treatment   Time In 1305       Time Out 1329       Minutes 24           Timed Code Treatment Minutes: 24 minutes    Total Treatment Minutes: 24 Minutes    If patient discharges prior to next treatment, this note will serve as discharge summary.     Justin Butterfield PT, DPT #[id number]

## 2023-03-13 NOTE — CARE COORDINATION
CM notified by RN she is sending a message to MD for walker order. MATILDA called Vickie Lovett with [facility] [phone] and he will watch for order.     Jayme Wseton RN, BSN  [phone]

## 2023-03-13 NOTE — PROGRESS NOTES
Office : [phone]     Fax :[phone]       Nephrology progress  Note      Patient's Name: Juan Carrillo  10:17 AM  3/13/2023    Reason for Consult: MARIA D      Requesting Physician:  Rolly Boo DO      Chief Complaint:    Chief Complaint   Patient presents with    Fall     Fall yesterday at 8:00 pm with strike to posterior head, now c/o left arm numbness and left leg weakness.  states fall due to dizziness. History of Present Ilness:    Juan Carrillo is a 47 y.o. female with prior history of breast cancer , CVA, CAD who was admitted with c/o dizziness. Initial lab work showed BUN of 68 and creat of 2.3. baseline creatinine is 1.4     Sodium is low at 128   Chloride level low at 84     Interval hx     Renal function better   Good UOP       I/O last 3 completed shifts:   In: 12 [P.O.:950]  Out: 3525 [Urine:3525]    Past Medical History:   Diagnosis Date    Anxiety     Anxiety and depression     Arthritis     not sure of specific type    Asthma     CAD (coronary artery disease)     Cancer ([address] 2007    left breast    Cerebral artery occlusion with cerebral infarction ([address]     2000, 2001    CHF (congestive heart failure) ([address] 2018    Chronic kidney disease     renal insufficency/to see Dr Wen Fraser    Chronic pain     Constipation     COPD (chronic obstructive pulmonary disease) ([address]     Depression     Diabetes mellitus ([address]     Diabetic polyneuropathy associated with type 2 diabetes mellitus ([address] 2/2/2018    Dysthymia 2/2/2018    ESBL (extended spectrum beta-lactamase) producing bacteria infection 03/14/2018    urine    Fibromyalgia     Gastric ulcer, unspecified as acute or chronic, without mention of hemorrhage, perforation, or obstruction GERD (gastroesophageal reflux disease)     Gout     HIGH CHOLESTEROL     Hypertension     Hypothyroidism     Pneumonia 3/25/2020    Pyelonephritis     Severe persistent asthma without complication     Thyroid disease     TIA (transient ischemic attack)     with occasional left leg and hand weakness       Past Surgical History:   Procedure Laterality Date    BREAST SURGERY      left mastectomy    CARPAL TUNNEL RELEASE      Bilateral    FINGER CONTRACTURE SURGERY      HYSTERECTOMY (CERVIX STATUS UNKNOWN)  2005    USO    MASTECTOMY      TONSILLECTOMY      UPPER GASTROINTESTINAL ENDOSCOPY  2019    stretched esophagous        Family History   Problem Relation Age of Onset    Asthma Other     Cancer Other     Depression Other     Diabetes Other     Hypertension Other     High Cholesterol Other     Migraines Other     Heart Attack Father 72         of MI    High Blood Pressure Mother     Diabetes Mother            Current Medications:    tamsulosin (FLOMAX) capsule 0.4 mg, Daily  torsemide (DEMADEX) tablet 20 mg, Daily  insulin lispro (HUMALOG) injection vial 15 Units, TID WC  insulin glargine (LANTUS) injection vial 50 Units, Nightly  linaclotide (LINZESS) capsule 145 mcg, QAM AC  sennosides-docusate sodium (SENOKOT-S) 8.6-50 MG tablet 2 tablet, BID  aspirin EC tablet 81 mg, Daily  benztropine (COGENTIN) tablet 1 mg, Nightly  butalbital-acetaminophen-caffeine (FIORICET, ESGIC) per tablet 1 tablet, Q6H PRN  diazePAM (VALIUM) tablet 5 mg, BID PRN  DULoxetine (CYMBALTA) extended release capsule 60 mg, BID  leflunomide (ARAVA) tablet 20 mg, Daily  levothyroxine (SYNTHROID) tablet 150 mcg, Daily  metoprolol tartrate (LOPRESSOR) tablet 25 mg, BID  montelukast (SINGULAIR) tablet 10 mg, Nightly  nitroGLYCERIN (NITROSTAT) SL tablet 0.4 mg, Q5 Min PRN  insulin lispro (HUMALOG) injection vial 0-16 Units, TID WC  insulin lispro (HUMALOG) injection vial 0-4 Units, Nightly  traZODone (DESYREL) tablet 50 mg, Nightly  ranolazine (RANEXA) extended release tablet 500 mg, BID  ondansetron (ZOFRAN-ODT) disintegrating tablet 4 mg, Q8H PRN   Or  ondansetron (ZOFRAN) injection 4 mg, Q6H PRN  polyethylene glycol (GLYCOLAX) packet 17 g, Daily PRN  perflutren lipid microspheres (DEFINITY) injection 1.5 mL, ONCE PRN  acetaminophen (TYLENOL) tablet 650 mg, Q4H PRN   Or  acetaminophen (TYLENOL) suppository 650 mg, Q4H PRN  atorvastatin (LIPITOR) tablet 80 mg, Nightly  labetalol (NORMODYNE;TRANDATE) injection 10 mg, Q10 Min PRN  clopidogrel (PLAVIX) tablet 75 mg, Daily  heparin (porcine) injection 5,000 Units, BID  dextrose bolus 10% 125 mL, PRN   Or  dextrose bolus 10% 250 mL, PRN  glucagon (rDNA) injection 1 mg, PRN  dextrose 10 % infusion, Continuous PRN  albuterol (PROVENTIL) nebulizer solution 2.5 mg, BID  albuterol (PROVENTIL) nebulizer solution 2.5 mg, Q4H PRN  oxyCODONE (OXYCONTIN) extended release tablet 10 mg, 2 times per day  meclizine (ANTIVERT) tablet 12.5 mg, TID PRN  oxyCODONE (ROXICODONE) immediate release tablet 5 mg, Q6H PRN        Physical exam:     Vitals:  /68   Pulse 74   Temp 98.3 °F (36.8 °C) (Oral)   Resp 19   Ht 5' 4\" (1.626 m)   Wt 187 lb (84.8 kg)   SpO2 96%   BMI 32.10 kg/m²   Constitutional:  OAA X3 NAD  Skin: no rash, turgor wnl  Heent:  eomi, mmm  Neck: no bruits or jvd noted  Cardiovascular:  S1, S2 without m/r/g  Respiratory: CTA B without w/r/r  Abdomen:  +bs, soft, nt, nd  Ext: has edema Left arm and hand  Psychiatric: mood and affect appropriate  Musculoskeletal:  Rom, muscular strength intact    Labs:  CBC:   Recent Labs     03/11/23  0725 03/12/23  0645 03/13/23  0805   WBC 9.0 10.4 13.6*   HGB 11.1* 10.9* 11.4*    295 310     BMP:    Recent Labs     03/11/23  0723 03/12/23  0645 03/13/23  0805    139 142   K 4.1 4.5 4.6   CL 97* 98* 101   CO2 36* 36* 35*   BUN 48* 34* 29*   CREATININE 1.4* 1.2* 1.1   GLUCOSE 167* 172* 125*     Ca/Mg/Phos:   Recent Labs 03/11/23  0723 03/12/23  0645 03/13/23  0805   CALCIUM 9.4 9.1 9.7     Hepatic:   Recent Labs     03/11/23  0723 03/13/23  0805   AST 24 16   ALT 14 11   BILITOT <0.2 <0.2   ALKPHOS 94 111     Troponin:   No results for input(s): TROPONINI in the last 72 hours. BNP: No results for input(s): BNP in the last 72 hours. Lipids:   No results for input(s): CHOL, TRIG, HDL, LDLCALC, LABVLDL in the last 72 hours. ABGs: No results for input(s): PHART, PO2ART, [id number] in the last 72 hours. INR:   No results for input(s): INR in the last 72 hours. UA:  No results for input(s): [facility], GLUCOSEU, BILIRUBINUR, KETUA, SPECGRAV, BLOODU, PHUR, PROTEINU, UROBILINOGEN, NITRU, LEUKOCYTESUR, Garnette Erps in the last 72 hours. Urine Microscopic: No results for input(s): LABCAST, BACTERIA, COMU, HYALCAST, WBCUA, RBCUA, EPIU in the last 72 hours. Urine Culture: No results for input(s): LABURIN in the last 72 hours. Urine Chemistry: No results for input(s): Sharlotte Alt, PROTEINUR, NAUR in the last 72 hours. IMAGING:  MRA NECK WO CONTRAST   Final Result   No acute intracranial abnormality. Old lacunar infarct in the right central miri. Mild parenchymal volume loss. Minimal chronic microvascular disease. Motion artifact. Otherwise, unremarkable noncontrast MRA of the head. Motion artifact. No eyes, unremarkable noncontrast MRA of the neck. MRA HEAD WO CONTRAST   Final Result   No acute intracranial abnormality. Old lacunar infarct in the right central miri. Mild parenchymal volume loss. Minimal chronic microvascular disease. Motion artifact. Otherwise, unremarkable noncontrast MRA of the head. Motion artifact. No eyes, unremarkable noncontrast MRA of the neck. MRI BRAIN WO CONTRAST MR WITNESS   Final Result   No acute intracranial abnormality. Old lacunar infarct in the right central miri.       Mild parenchymal volume loss.      Minimal chronic microvascular disease. Motion artifact. Otherwise, unremarkable noncontrast MRA of the head. Motion artifact. No eyes, unremarkable noncontrast MRA of the neck. XR CHEST PORTABLE   Final Result   Pulmonary vascular congestion along with increased interstitial opacity,   suggesting interstitial edema. CT HEAD WO CONTRAST   Final Result   Small suspected lacunar infarct within the right paramedian miri. No large territory acute cortical infarct, hemorrhage or intracranial mass   effect. The findings were sent to the Radiology Results [address] at 2:56   pm on 3/9/2023 to be communicated to a licensed caregiver, received by   ALBERTO Rondon at 2:59 pm.         VL Extremity Venous Right    (Results Pending)             Assessment/Plan :        1.  MARIA D   Improving   Has ckd   Maria D 2/2 urinary retention     Seems like had chronic urinary retention . Carmona in place   Now   Hold metolazone   Continue  torsemide         2. CHF   Pulmonary congestion   Continue  torsemide     3. Anemia  Hb 10.7     Monitor     4. Acid- base disorder. Monitor     5.  Hyponatremia   2/2 hypervolemia   Improved         Recommend to dose adjust all medications  based on renal functions  Maintain SBP> 90 mmHg   Daily weights   AVOID NSAIDs  Avoid Nephrotoxins  Monitor Intake/Output  Call if significant decrease in urine output              D/w primary team      Thank you for allowing us to participate in care of [address] [place] [address]         Electronically signed by: Eleno Hodgkins, MD, 3/13/2023, 10:17 AM      [facility] [address]  Office : [phone]  Fax :[phone]

## 2023-03-13 NOTE — CONSULTS
Urology Consult Note  [facility]     Patient: Farzana Shannon MRN: [id number]  Room/Bed: [facility]1991/3374-11   YOB: 1970  Age/Sex: 47 y. o.female  Admission Date: 3/9/2023     Date of Service:  3/13/2023    Consulting Provider: HERMES Santos - TIFFANY  Admitting/Requesting Physician: Ryan Lugo MD  Primary Care Physician: Yamile Romero DO    Reason for Consult: Urinary Retention    ASSESSMENT/PLAN     48 yo neuropathic female with hx of DM and elevated a1c's  Admitted with acute CVA- suspect lacunar infarct  MARIA D on CKD, Cr 2.3 on arrival, baseline suspected to be 1.7, but now 1.1 with a jamison in place- raising suspicion of chronic retention  Jamison inserted for PVR >999cc's. No cross sectional imaging. U/A glycosuria. Recommendations:  Likely chronic urinary retention in the setting of DM with elevated A1c's, exacerbated by acute CVA and hospitalization. Cr better than it has been in years with jamison in place. Outpatient follow up with jamison recommended, family and patient would like to attempt a [place] inpatient. Start Flomax. Remove jamison. Obtain a PVR after first void, replace jamison if PVR >350cc's. Outpatient follow up requested. All the patients questions were answered. She understands the plan as listed above. HISTORY     Chief Complaint:   Chief Complaint   Patient presents with    Fall     Fall yesterday at 8:00 pm with strike to posterior head, now c/o left arm numbness and left leg weakness.  states fall due to dizziness. History of Present Illness: Farzana Shannon is a 47 y.o. female with urinary retention. Onset of symptoms, like chronic with longstanding retention, but completely unable to urinate after acute CVA,   with improving course since that time. Symptoms are aggravated by CVA and DM. Symptoms improved with jamison. Associated symptoms include pelvic distension. Patient also reports decreased urine output.  She has tried the following treatments: jamison. Past Medical History:  She has a past medical history of Anxiety, Anxiety and depression, Arthritis, Asthma, CAD (coronary artery disease), Cancer ([facility] (2007), Cerebral artery occlusion with cerebral infarction [facility]), CHF (congestive heart failure) ([facility] (2018), Chronic kidney disease, Chronic pain, Constipation, COPD (chronic obstructive pulmonary disease) ([facility] Depression, Diabetes mellitus ([facility] Diabetic polyneuropathy associated with type 2 diabetes mellitus ([facility] (2/2/2018), Dysthymia (2/2/2018), ESBL (extended spectrum beta-lactamase) producing bacteria infection (03/14/2018), Fibromyalgia, Gastric ulcer, unspecified as acute or chronic, without mention of hemorrhage, perforation, or obstruction, GERD (gastroesophageal reflux disease), Gout, HIGH CHOLESTEROL, Hypertension, Hypothyroidism, Pneumonia (3/25/2020), Pyelonephritis, Severe persistent asthma without complication (5/6/9566), Thyroid disease, and TIA (transient ischemic attack). Past Surgical History:  She has a past surgical history that includes Breast surgery; Hysterectomy (2005); Carpal tunnel release; Tonsillectomy; Finger contracture surgery; Upper gastrointestinal endoscopy (2019); and Mastectomy. Allergies: Allergies   Allergen Reactions    Iv Dye [Iodides] Anaphylaxis     allergic to ct scan dye, not the MRI    Diazepam Other (See Comments)    Insulin Glargine Other (See Comments)     High blood sugar     Trazodone And Nefazodone Other (See Comments)     Makes patient feel very sluggish        Social History:  She reports that she has never smoked. She has never used smokeless tobacco. She reports that she does not drink alcohol and does not use drugs.      Family History:  family history includes Asthma in an other family member; Cancer in an other family member; Depression in an other family member; Diabetes in her mother and another family member; Heart Attack (age of onset: 72) in her father; High Blood Pressure in her mother; High Cholesterol in an other family member; Hypertension in an other family member; Migraines in an other family member. Medications:  Scheduled Meds:   torsemide  20 mg Oral Daily    insulin lispro  15 Units SubCUTAneous TID WC    insulin glargine  50 Units SubCUTAneous Nightly    linaclotide  145 mcg Oral QAM AC    sennosides-docusate sodium  2 tablet Oral BID    aspirin  81 mg Oral Daily    benztropine  1 mg Oral Nightly    DULoxetine  60 mg Oral BID    leflunomide  20 mg Oral Daily    levothyroxine  150 mcg Oral Daily    metoprolol tartrate  25 mg Oral BID    montelukast  10 mg Oral Nightly    insulin lispro  0-16 Units SubCUTAneous TID WC    insulin lispro  0-4 Units SubCUTAneous Nightly    traZODone  50 mg Oral Nightly    ranolazine  500 mg Oral BID    atorvastatin  80 mg Oral Nightly    clopidogrel  75 mg Oral Daily    heparin (porcine)  5,000 Units SubCUTAneous BID    albuterol  2.5 mg Nebulization BID    oxyCODONE  10 mg Oral 2 times per day     Continuous Infusions:   dextrose       PRN Meds:butalbital-acetaminophen-caffeine, diazePAM, nitroGLYCERIN, ondansetron **OR** ondansetron, polyethylene glycol, perflutren lipid microspheres, acetaminophen **OR** acetaminophen, labetalol, dextrose bolus **OR** dextrose bolus, glucagon (rDNA), dextrose, albuterol, meclizine, oxyCODONE    Review of Systems:  Pertinent positives/negatives reviewed in HPI. All other systems reviewed and negative, unless noted below. Constitutional: Negative  Genitourinary:  See hpi  HEENT: Negative   Cardiovascular: Negative   Respiratory: Negative   Gastrointestinal: Negative   Musculoskeletal: Negative   Neurological: Negative   Psychiatric: Negative   Integumentary: Negative     PHYSICAL EXAM     Vitals:    03/13/23 0915   BP:    Pulse: 74   Resp: 14   Temp:    SpO2: 96%     Constitutional: NAD, well-developed, well-nourished. HEENT: MMM. Hearing intact. Neck: no thyroid masses appreciated.  Trachea is midline. Neck appears unremarkable.  Lymph: no palpable adenopathy in supraclavicular, or axillary lymph nodes.  Cardiovascular: Regular rate. No peripheral edema.  ABDOMEN: Ano guarding  Respiratory: Respirations are even and non-labored. No audible breath sounds.  Genitourinary: no CVAT, jamison in place draining CYU  Psychiatric: A + O x 3, normal affect. Insight appears intact.  Muskuloskeletal: JOSE ALEJANDRO x 4  Skin: Pink, warm and dry.  No rashes on face and arms.      Intake/Output Summary (Last 24 hours) at 3/13/2023 0930  Last data filed at 3/12/2023 1751  Gross per 24 hour   Intake 450 ml   Output 1800 ml   Net -1350 ml       LABS     CBC:   Lab Results   Component Value Date/Time    WBC 13.6 03/13/2023 08:05 AM    RBC 3.99 03/13/2023 08:05 AM    RBC 3.57 05/16/2017 03:47 PM    HGB 11.4 03/13/2023 08:05 AM    HCT 35.5 03/13/2023 08:05 AM    MCV 88.8 03/13/2023 08:05 AM    MCH 28.6 03/13/2023 08:05 AM    MCHC 32.2 03/13/2023 08:05 AM    RDW 13.6 03/13/2023 08:05 AM     03/13/2023 08:05 AM    MPV 9.2 03/13/2023 08:05 AM     BMP:   Lab Results   Component Value Date/Time     03/13/2023 08:05 AM    K 4.6 03/13/2023 08:05 AM     03/13/2023 08:05 AM    CO2 35 03/13/2023 08:05 AM    BUN 29 03/13/2023 08:05 AM    CREATININE 1.1 03/13/2023 08:05 AM    GLUCOSE 125 03/13/2023 08:05 AM    GLUCOSE 313 05/16/2017 03:47 PM    CALCIUM 9.7 03/13/2023 08:05 AM     Urinalysis:   Lab Results   Component Value Date/Time    COLORU Yellow 03/09/2023 03:44 PM    GLUCOSEU 500 03/09/2023 03:44 PM    GLUCOSEU >=1000 07/08/2010 03:25 PM    BLOODU Negative 03/09/2023 03:44 PM    NITRU Negative 03/09/2023 03:44 PM    LEUKOCYTESUR Negative 03/09/2023 03:44 PM     Urine culture: No results for input(s): LABURIN in the last 72 hours.     IMAGING     CT HEAD WO CONTRAST    Result Date: 3/9/2023  EXAMINATION: CT OF THE HEAD WITHOUT CONTRAST  3/9/2023 2:43 pm TECHNIQUE: CT of the head was performed without the administration of  intravenous contrast. Automated exposure control, iterative reconstruction, and/or weight based adjustment of the mA/kV was utilized to reduce the radiation dose to as low as reasonably achievable. COMPARISON: MR brain 05/17/2022 HISTORY: ORDERING SYSTEM PROVIDED HISTORY: speech disturbance. weakness FINDINGS: BRAIN/VENTRICLES:  Small suspected lacunar infarct within the right paramedian miri. No large territory acute cortical infarct. No evidence of acute intracranial hemorrhage. There is no evidence of an intracranial mass or extraaxial fluid collection. No significant mass effect or midline shift. There is mild generalized volume loss. Ventricular enlargement concordant with the degree of parenchymal volume loss. Scattered vascular calcifications throughout the carotid siphons. ORBITS: The visualized portion of the orbits demonstrate no acute abnormality. SINUSES:  The visualized paranasal sinuses and mastoid air cells demonstrate no acute abnormality. Polypoid mucosal thickening throughout floor of the maxillary sinuses. SOFT TISSUES/SKULL: No acute abnormality of the visualized skull or soft tissues. Small suspected lacunar infarct within the right paramedian miri. No large territory acute cortical infarct, hemorrhage or intracranial mass effect. The findings were sent to the Radiology Results [address] at 2:56 pm on 3/9/2023 to be communicated to a licensed caregiver, received by ALBERTO Mora at 2:59 pm.     MRA HEAD WO CONTRAST    Result Date: 3/9/2023  EXAMINATION: MRI OF THE BRAIN WITHOUT CONTRAST; MRA OF THE HEAD WITHOUT CONTRAST; MRA OF THE NECK WITHOUT CONTRAST  3/9/2023 8:22 pm; 3/9/2023 8:35 pm TECHNIQUE: Multiplanar multisequence MRI of the brain was performed without the administration of intravenous contrast.; MRA of the head was performed utilizing time-of-flight imaging with MIP images.  No intravenous contrast was administered.; Multiplanar multisequence MRA of the neck was performed without the administration of intravenous contrast. Stenosis of the internal carotid arteries measured using NASCET criteria. COMPARISON: MRI brain May 17, 2022 HISTORY: ORDERING SYSTEM PROVIDED HISTORY: weakness left TECHNOLOGIST PROVIDED HISTORY: Reason for exam:->weakness left What is the sedation requirement?->None Reason for Exam: weakness left; ORDERING SYSTEM PROVIDED HISTORY: left weakness. dizziness. speech disturbance TECHNOLOGIST PROVIDED HISTORY: Reason for exam:->left weakness. dizziness. speech disturbance What is the sedation requirement?->None Reason for Exam: weakness left pt. constantly moving obtained best possible images FINDINGS: MRI Brain: INTRACRANIAL STRUCTURES/VENTRICLES: There is mild parenchymal volume loss, most pronounced in the bilateral parietal lobes. There is mild T2/FLAIR hyperintensity in the periventricular and subcortical white matter, likely related to minimal chronic microvascular disease. There is old lacunar infarct in the right central miri. There is no acute infarct. No mass effect or midline shift. No acute intracranial hemorrhage. There is no hydrocephalus. The sellar/suprasellar regions appear unremarkable. The normal signal voids within the major intracranial vessels appear maintained. ORBITS: The visualized portion of the orbits demonstrate no acute abnormality. SINUSES: There are retention cysts versus polyps in the bilateral maxillary sinuses. There is scattered mild mucosal thickening in the paranasal sinuses. The bilateral mastoid air cells are clear. BONES/SOFT TISSUES: The bone marrow signal intensity appears normal. The soft tissues demonstrate no acute abnormality. MRA HEAD: Motion artifact ANTERIOR CIRCULATION: The internal carotid arteries are normal in course and caliber without focal stenosis. The anterior cerebral and middle cerebral arteries demonstrate no focal stenosis.  POSTERIOR CIRCULATION: The posterior cerebral arteries demonstrate no focal stenosis. The vertebral and basilar arteries appear unremarkable. No intracranial aneurysm. MRA neck: Motion artifact. Common and internal carotid arteries: The bilateral common carotid arteries are within normal limits without flow limiting stenosis. The bilateral internal carotid arteries are within normal limits without flow limiting stenosis by NASCET criteria. There is no dissection or vascular injury. Vertebral arteries: The bilateral vertebral arteries are within normal limits without focal stenosis or dissection. No acute intracranial abnormality. Old lacunar infarct in the right central miri. Mild parenchymal volume loss. Minimal chronic microvascular disease. Motion artifact. Otherwise, unremarkable noncontrast MRA of the head. Motion artifact. No eyes, unremarkable noncontrast MRA of the neck. XR CHEST PORTABLE    Result Date: 3/9/2023  EXAMINATION: ONE XRAY VIEW OF THE CHEST 3/9/2023 12:08 pm COMPARISON: 10/28/2020 HISTORY: ORDERING SYSTEM PROVIDED HISTORY: dizziness TECHNOLOGIST PROVIDED HISTORY: Reason for exam:->dizziness FINDINGS: Cardial pericardial silhouette is enlarged but stable. The pulmonary vasculature is congested. Increased interstitial opacities noted. There is elevation of the right hemidiaphragm with adjacent mild atelectasis. No pneumothorax is found. No free air. No acute bony abnormality. Georgi catheter noted. Pulmonary vascular congestion along with increased interstitial opacity, suggesting interstitial edema. MRA NECK WO CONTRAST    Result Date: 3/9/2023  EXAMINATION: MRI OF THE BRAIN WITHOUT CONTRAST; MRA OF THE HEAD WITHOUT CONTRAST; MRA OF THE NECK WITHOUT CONTRAST  3/9/2023 8:22 pm; 3/9/2023 8:35 pm TECHNIQUE: Multiplanar multisequence MRI of the brain was performed without the administration of intravenous contrast.; MRA of the head was performed utilizing time-of-flight imaging with MIP images.  No intravenous contrast was administered.; Multiplanar multisequence MRA of the neck was performed without the administration of intravenous contrast. Stenosis of the internal carotid arteries measured using NASCET criteria. COMPARISON: MRI brain May 17, 2022 HISTORY: ORDERING SYSTEM PROVIDED HISTORY: weakness left TECHNOLOGIST PROVIDED HISTORY: Reason for exam:->weakness left What is the sedation requirement?->None Reason for Exam: weakness left; ORDERING SYSTEM PROVIDED HISTORY: left weakness. dizziness. speech disturbance TECHNOLOGIST PROVIDED HISTORY: Reason for exam:->left weakness. dizziness. speech disturbance What is the sedation requirement?->None Reason for Exam: weakness left pt. constantly moving obtained best possible images FINDINGS: MRI Brain: INTRACRANIAL STRUCTURES/VENTRICLES: There is mild parenchymal volume loss, most pronounced in the bilateral parietal lobes. There is mild T2/FLAIR hyperintensity in the periventricular and subcortical white matter, likely related to minimal chronic microvascular disease. There is old lacunar infarct in the right central miri. There is no acute infarct. No mass effect or midline shift. No acute intracranial hemorrhage. There is no hydrocephalus. The sellar/suprasellar regions appear unremarkable. The normal signal voids within the major intracranial vessels appear maintained. ORBITS: The visualized portion of the orbits demonstrate no acute abnormality. SINUSES: There are retention cysts versus polyps in the bilateral maxillary sinuses. There is scattered mild mucosal thickening in the paranasal sinuses. The bilateral mastoid air cells are clear. BONES/SOFT TISSUES: The bone marrow signal intensity appears normal. The soft tissues demonstrate no acute abnormality. MRA HEAD: Motion artifact ANTERIOR CIRCULATION: The internal carotid arteries are normal in course and caliber without focal stenosis. The anterior cerebral and middle cerebral arteries demonstrate no focal stenosis. POSTERIOR CIRCULATION: The posterior cerebral arteries demonstrate no focal stenosis. The vertebral and basilar arteries appear unremarkable. No intracranial aneurysm. MRA neck: Motion artifact. Common and internal carotid arteries: The bilateral common carotid arteries are within normal limits without flow limiting stenosis. The bilateral internal carotid arteries are within normal limits without flow limiting stenosis by NASCET criteria. There is no dissection or vascular injury. Vertebral arteries: The bilateral vertebral arteries are within normal limits without focal stenosis or dissection. No acute intracranial abnormality. Old lacunar infarct in the right central miri. Mild parenchymal volume loss. Minimal chronic microvascular disease. Motion artifact. Otherwise, unremarkable noncontrast MRA of the head. Motion artifact. No eyes, unremarkable noncontrast MRA of the neck. MRI BRAIN WO CONTRAST MR WITNESS    Result Date: 3/9/2023  EXAMINATION: MRI OF THE BRAIN WITHOUT CONTRAST; MRA OF THE HEAD WITHOUT CONTRAST; MRA OF THE NECK WITHOUT CONTRAST  3/9/2023 8:22 pm; 3/9/2023 8:35 pm TECHNIQUE: Multiplanar multisequence MRI of the brain was performed without the administration of intravenous contrast.; MRA of the head was performed utilizing time-of-flight imaging with MIP images. No intravenous contrast was administered.; Multiplanar multisequence MRA of the neck was performed without the administration of intravenous contrast. Stenosis of the internal carotid arteries measured using NASCET criteria. COMPARISON: MRI brain May 17, 2022 HISTORY: ORDERING SYSTEM PROVIDED HISTORY: weakness left TECHNOLOGIST PROVIDED HISTORY: Reason for exam:->weakness left What is the sedation requirement?->None Reason for Exam: weakness left; ORDERING SYSTEM PROVIDED HISTORY: left weakness. dizziness. speech disturbance TECHNOLOGIST PROVIDED HISTORY: Reason for exam:->left weakness. dizziness.  speech disturbance What is the sedation requirement?->None Reason for Exam: weakness left pt. constantly moving obtained best possible images FINDINGS: MRI Brain: INTRACRANIAL STRUCTURES/VENTRICLES: There is mild parenchymal volume loss, most pronounced in the bilateral parietal lobes. There is mild T2/FLAIR hyperintensity in the periventricular and subcortical white matter, likely related to minimal chronic microvascular disease. There is old lacunar infarct in the right central miri. There is no acute infarct. No mass effect or midline shift. No acute intracranial hemorrhage. There is no hydrocephalus. The sellar/suprasellar regions appear unremarkable. The normal signal voids within the major intracranial vessels appear maintained. ORBITS: The visualized portion of the orbits demonstrate no acute abnormality. SINUSES: There are retention cysts versus polyps in the bilateral maxillary sinuses. There is scattered mild mucosal thickening in the paranasal sinuses. The bilateral mastoid air cells are clear. BONES/SOFT TISSUES: The bone marrow signal intensity appears normal. The soft tissues demonstrate no acute abnormality. MRA HEAD: Motion artifact ANTERIOR CIRCULATION: The internal carotid arteries are normal in course and caliber without focal stenosis. The anterior cerebral and middle cerebral arteries demonstrate no focal stenosis. POSTERIOR CIRCULATION: The posterior cerebral arteries demonstrate no focal stenosis. The vertebral and basilar arteries appear unremarkable. No intracranial aneurysm. MRA neck: Motion artifact. Common and internal carotid arteries: The bilateral common carotid arteries are within normal limits without flow limiting stenosis. The bilateral internal carotid arteries are within normal limits without flow limiting stenosis by NASCET criteria. There is no dissection or vascular injury. Vertebral arteries: The bilateral vertebral arteries are within normal limits without focal stenosis or dissection.      No acute intracranial abnormality. Old lacunar infarct in the right central miri. Mild parenchymal volume loss. Minimal chronic microvascular disease. Motion artifact. Otherwise, unremarkable noncontrast MRA of the head. Motion artifact. No eyes, unremarkable noncontrast MRA of the neck.             Electronically signed by: HERMES Lam CNP, 3/13/2023   [facility]  Office contact: [phone]

## 2023-03-13 NOTE — PLAN OF CARE
Problem: Discharge Planning  Goal: Discharge to home or other facility with appropriate resources  3/13/2023 0917 by Deidra Mercedes RN  Outcome: Progressing     Problem: Pain  Goal: Verbalizes/displays adequate comfort level or baseline comfort level  3/13/2023 0917 by Deidra Mercedes RN  Outcome: Progressing     Problem: Skin/Tissue Integrity  Goal: Absence of new skin breakdown  Description: 1. Monitor for areas of redness and/or skin breakdown  2. Assess vascular access sites hourly  3. Every 4-6 hours minimum:  Change oxygen saturation probe site  4. Every 4-6 hours:  If on nasal continuous positive airway pressure, respiratory therapy assess nares and determine need for appliance change or resting period.   Outcome: Progressing     Problem: Safety - Adult  Goal: Free from fall injury  3/13/2023 0917 by Deidra Mercedes RN  Outcome: Progressing     Problem: ABCDS Injury Assessment  Goal: Absence of physical injury  Outcome: Progressing     Problem: Cardiovascular - Adult  Goal: Maintains optimal cardiac output and hemodynamic stability  Outcome: Progressing     Problem: Skin/Tissue Integrity - Adult  Goal: Skin integrity remains intact  Outcome: Progressing  Flowsheets (Taken 3/12/2023 1930 by Joseph Jones, RN)  Skin Integrity Remains Intact: Monitor for areas of redness and/or skin breakdown     Problem: Skin/Tissue Integrity - Adult  Goal: Oral mucous membranes remain intact  Outcome: Progressing  Flowsheets (Taken 3/12/2023 1930 by Joseph Jones, RN)  Oral Mucous Membranes Remain Intact: Assess oral mucosa and hygiene practices     Problem: Gastrointestinal - Adult  Goal: Minimal or absence of nausea and vomiting  Outcome: Progressing     Problem: Musculoskeletal - Adult  Goal: Return mobility to safest level of function  Outcome: Progressing     Problem: Genitourinary - Adult  Goal: Absence of urinary retention  Outcome: Progressing     Problem: Hematologic - Adult  Goal: Maintains hematologic stability  Outcome: Progressing     Problem: Chronic Conditions and Co-morbidities  Goal: Patient's chronic conditions and co-morbidity symptoms are monitored and maintained or improved  Outcome: Progressing     Problem: Neurosensory - Adult  Goal: Achieves stable or improved neurological status  Outcome: Progressing

## 2023-03-13 NOTE — CARE COORDINATION
[facility] cannot accept pt's caresource they have reach their limit for the month.  did not hear back from [facility] with [facility] on Friday. Cm called and left him  [phone]. CM also called office of [facility] and had to leave a message [phone]. MATILDA called Claudio Dominguez with [address] [phone] and he can accept pt and has RN/PT/OT/MSW and aide but cannot provide speech. MATILDA sent message to MD de la cruz to see if he could remove speech as limited home care services for caresource insurance. Message was read.      Oli Prieto RN, BSN  [phone]

## 2023-03-13 NOTE — PROGRESS NOTES
[facility] [address]   Phone: [phone]    Occupational Therapy    [] Initial Evaluation            [x] Daily Treatment Note         [] Discharge Summary      Patient: Shelia Armijo   : 1970   MRN: [id number]   Date of Service:  3/13/2023    Admitting Diagnosis:  Acute CVA (cerebrovascular accident) [facility])  Current Admission Summary: 47 y.o. female who presents to the emergency department stating that she has been feeling intermittently dizzy and shaky for several days and had a fall yesterday. In review of her chart, she does have history of TIA with left-sided weakness. She wears 3 L of oxygen at all time. She has chronic lymphedema in the left upper extremity and right lower extremity. She is able to speak some [place] but  does help with translation and secondary to her [employer], she prefers not to use an . CT scan did show possible lacunar infarct MRI does not show acuity. Patient did had a urinary retention Carmona was placed  Past Medical History:  has a past medical history of Anxiety, Anxiety and depression, Arthritis, Asthma, CAD (coronary artery disease), Cancer ([address] Cerebral artery occlusion with cerebral infarction ([address] CHF (congestive heart failure) ([address] Chronic kidney disease, Chronic pain, Constipation, COPD (chronic obstructive pulmonary disease) ([address] Depression, Diabetes mellitus ([address] Diabetic polyneuropathy associated with type 2 diabetes mellitus ([address] Dysthymia, ESBL (extended spectrum beta-lactamase) producing bacteria infection, Fibromyalgia, Gastric ulcer, unspecified as acute or chronic, without mention of hemorrhage, perforation, or obstruction, GERD (gastroesophageal reflux disease), Gout, HIGH CHOLESTEROL, Hypertension, Hypothyroidism, Pneumonia, Pyelonephritis, Severe persistent asthma without complication, Thyroid disease, and TIA (transient ischemic attack).   Past Surgical History:  has a past surgical history that includes Breast surgery; Hysterectomy (2005); Carpal tunnel release; Tonsillectomy; Finger contracture surgery; Upper gastrointestinal endoscopy (2019); and Mastectomy. Discharge Recommendations: Nieves Rivero scored a 20/24 on the AM-PAC ADL Inpatient form. Current research shows that an AM-PAC score of 18 or greater is typically associated with a discharge to the patient's home setting. Based on the patient's AM-PAC score, and their current ADL deficits, it is recommended that the patient have 2-3 sessions per week of Occupational Therapy at d/c to increase the patient's independence. At this time, this patient demonstrates the endurance and safety to discharge home with home OT services and a follow up treatment frequency of 2-3x/wk. Please see assessment section for further patient specific details. HOME HEALTH CARE: LEVEL 1 STANDARD    - Initial home health evaluation to occur within 24-48 hours, in patient home   - Therapy to evaluate with goal of regaining prior level of functioning   - Therapy to evaluate if patient has [address] needs for personal care     If patient discharges prior to next session this note will serve as a discharge summary. Please see below for the latest assessment towards goals.       DME Required For Discharge: rolling walker    Precautions/Restrictions: high fall risk  Weight Bearing Restrictions: no restrictions  [] Right Upper Extremity  [] Left Upper Extremity [] Right Lower Extremity  [] Left Lower Extremity     Required Braces/Orthotics: no braces required   [] Right  [] Left  Positional Restrictions:no positional restrictions    Pre-Admission Information   Lives With: spouse, son    Type of Home: house  Home Layout: one level  Home Access: level entry  Bathroom Layout: tub/shower unit, walk in shower, pt uses tub/shower  Bathroom Equipment: grab bars in shower  Toilet Height: standard height  Home Equipment: single point cane  Transfer Assistance: Independent without use of device  Ambulation Assistance:Independent without use of device  ADL Assistance: requires assistance with bathing, IND with dressing, toileting, grooming  IADL Assistance: independent with homemaking tasks, family assists as well  Active :        [] Yes  [x] No  Hand Dominance: [] Left  [x] Right  Recent Falls: pt reports 2 recent falls in last 30 days      Observation:   General Observation:  significant edema to L UE, moderate edema to R LE- h/o lymphedema to LUE and R LE      Subjective  General: patient supine in bed on arrival, agreeable to OT treatment. Pt English speaking and able to understand some English,  present to assist/interpret as needed  Pain: 0/10  Pain Interventions: not applicable     Activities of Daily Living  Basic Activities of Daily Living  Upper Extremity Dressing: Independent  Lower Extremity Dressing: minimal assistance  Dressing Comments: Min A to doff/son socks. Pt donned underwear and pants from home independently while alternating between sitting EOB and standing. Pt doffed gown and donned tank top and jacket from home independently while seated EOB. General comment: Pt declined all other ADL tasks this date. Increased time needed for UB and LB dressing  Instrumental Activities of Daily Living  No IADL completed on this date. Functional Mobility  Bed Mobility  Supine to Sit: supervision  Scooting: supervision  Comments: HOB elevated. Transfers  Sit to stand transfer:supervision  Stand to sit transfer: supervision  Comments: no device used. Multiple trials from EOB and to EOB/recliner  Functional Mobility:  Sitting Balance: supervision. Sitting Balance Comment: Pt sat EOB for ~15 min  Standing Balance: supervision. Standing Balance Comment: Pt stood for ~1-2 min at at time. Functional Mobility: .   supervision  Functional Mobility Activity: From EOB to recliner near door   Functional Mobility Device Use: rolling walker  Functional Mobility Comment: Pt ambulated ~10 ft. Steady pace, no LOB    Other Therapeutic Interventions    Functional Outcomes  AM-PAC Inpatient Daily Activity Raw Score: 20    Cognition  WFL  Orientation:    alert and oriented x 4  Command Following:   [facility]  Barriers To Learning: cultural  Patient Education: patient educated on OT role and benefits, plan of care, ADL adaptive strategies, transfer training, discharge recommendations  Learning Assessment:  patient verbalizes understanding, would benefit from continued reinforcement    Assessment  Activity Tolerance: pt tolerated session well. Impairments Requiring Therapeutic Intervention: decreased functional mobility, decreased ADL status, decreased strength, decreased endurance, decreased balance, decreased IADL, decreased fine motor control  Prognosis: fair  Clinical Assessment: Patient presents with above deficits and is below baseline, now requiring supervision or functional transfers and mobility. Pt independent with UB dressing and requires Min A to don/doff socks. Pt would benefit from continued skilled acute OT services to facilitate return to PLOF.   Safety Interventions: patient left in chair, chair alarm in place, call light within reach, nurse notified, and family/caregiver present    Plan  Frequency: 3-5 x/per week  Current Treatment Recommendations: strengthening, balance training, functional mobility training, transfer training, endurance training, patient/caregiver education, ADL/self-care training, and equipment evaluation/education    Goals  Patient Goals: to return home   Short Term Goals:  Time Frame: discharge  Patient will complete lower body ADL at minimal assistance; Min A 3/13 GOAL MET  Patient will complete toileting at minimal assistance; ongoing 3/13  Patient will complete functional transfers at stand by assistance; supervision 3/13 GOAL MET   Patient will complete functional mobility at stand by assistance; supervision 3/13 GOAL MET  REVISED GOAL: Patient will complete lower body ADL at independent   REVISED GOAL: Patient will complete functional transfers at Mod I w/ RW  REVISED GOAL: Patient will complete functional mobility at Mod I w/ RW    Therapy Session Time     Individual Group Co-treatment   Time In 1350     Time Out 1416     Minutes 26          Timed Code Treatment Minutes:   26 minutes  Total Treatment Minutes:  26 minutes       Electronically Signed By: CARMELO Sweeney, S/OT  I have directly observed this treatment and have read and approve this note.    Asuncion Min, OTR/L, [id number]

## 2023-03-13 NOTE — PROGRESS NOTES
CLINICAL PHARMACY NOTE: MEDS TO BEDS    Total # of Prescriptions Filled: 3   The following medications were delivered to the patient:  Clopidogrel  Tamsulosin   Torsemide      Additional Documentation:  Pt picked up

## 2023-03-13 NOTE — PROGRESS NOTES
[facility]    3/12/2023 8:17 PM        Name: Nieves Rivero . Admitted: 3/9/2023  Primary Care Provider: Israel Lucia DO (Tel: [phone])      Subjective:  .     Admitted for dizziness and renal failure  Carmona has been placed d/t  urinary retention     Reviewed interval ancillary notes    Current Medications  torsemide (DEMADEX) tablet 20 mg, Daily  insulin lispro (HUMALOG) injection vial 15 Units, TID WC  insulin glargine (LANTUS) injection vial 50 Units, Nightly  linaclotide (LINZESS) capsule 145 mcg, QAM AC  sennosides-docusate sodium (SENOKOT-S) 8.6-50 MG tablet 2 tablet, BID  aspirin EC tablet 81 mg, Daily  benztropine (COGENTIN) tablet 1 mg, Nightly  butalbital-acetaminophen-caffeine (FIORICET, ESGIC) per tablet 1 tablet, Q6H PRN  diazePAM (VALIUM) tablet 5 mg, BID PRN  DULoxetine (CYMBALTA) extended release capsule 60 mg, BID  leflunomide (ARAVA) tablet 20 mg, Daily  levothyroxine (SYNTHROID) tablet 150 mcg, Daily  metoprolol tartrate (LOPRESSOR) tablet 25 mg, BID  montelukast (SINGULAIR) tablet 10 mg, Nightly  nitroGLYCERIN (NITROSTAT) SL tablet 0.4 mg, Q5 Min PRN  insulin lispro (HUMALOG) injection vial 0-16 Units, TID WC  insulin lispro (HUMALOG) injection vial 0-4 Units, Nightly  traZODone (DESYREL) tablet 50 mg, Nightly  ranolazine (RANEXA) extended release tablet 500 mg, BID  ondansetron (ZOFRAN-ODT) disintegrating tablet 4 mg, Q8H PRN   Or  ondansetron (ZOFRAN) injection 4 mg, Q6H PRN  polyethylene glycol (GLYCOLAX) packet 17 g, Daily PRN  perflutren lipid microspheres (DEFINITY) injection 1.5 mL, ONCE PRN  acetaminophen (TYLENOL) tablet 650 mg, Q4H PRN   Or  acetaminophen (TYLENOL) suppository 650 mg, Q4H PRN  atorvastatin (LIPITOR) tablet 80 mg, Nightly  labetalol (NORMODYNE;TRANDATE) injection 10 mg, Q10 Min PRN  clopidogrel (PLAVIX) tablet 75 mg, Daily  heparin (porcine) injection 5,000 Units, BID  dextrose bolus 10% 125 mL, PRN   Or  dextrose bolus 10% 250 mL, PRN  glucagon (rDNA) injection 1 mg, PRN  dextrose 10 % infusion, Continuous PRN  albuterol (PROVENTIL) nebulizer solution 2.5 mg, BID  albuterol (PROVENTIL) nebulizer solution 2.5 mg, Q4H PRN  oxyCODONE (OXYCONTIN) extended release tablet 10 mg, 2 times per day  meclizine (ANTIVERT) tablet 12.5 mg, TID PRN  oxyCODONE (ROXICODONE) immediate release tablet 5 mg, Q6H PRN        Objective:  /63   Pulse 77   Temp 98 °F (36.7 °C) (Axillary)   Resp 18   Ht 5' 4\" (1.626 m)   Wt 187 lb (84.8 kg)   SpO2 94%   BMI 32.10 kg/m²     Intake/Output Summary (Last 24 hours) at 3/12/2023 2017  Last data filed at 3/12/2023 1751  Gross per 24 hour   Intake 950 ml   Output 3525 ml   Net -2575 ml      Wt Readings from Last 3 Encounters:   03/12/23 187 lb (84.8 kg)   01/26/23 202 lb (91.6 kg)   01/16/23 203 lb 3.2 oz (92.2 kg)       General appearance:  Appears comfortable  Eyes: Sclera clear. Pupils equal.  ENT: Moist oral mucosa. Trachea midline, no adenopathy. Cardiovascular: Regular rhythm, normal S1, S2. No murmur. No edema in lower extremities  Respiratory: Not using accessory muscles. Good inspiratory effort. Clear to auscultation bilaterally, no wheeze or crackles. GI: Abdomen soft, no tenderness, not distended, normal bowel sounds  Musculoskeletal: No cyanosis in digits, neck supple  Neurology: CN 2-12 grossly intact. No speech or motor deficits  Psych: Normal affect.  Alert and oriented in time, place and person  Skin: Warm, dry, normal turgor    Labs and Tests:  CBC:   Recent Labs     03/10/23  0200 03/11/23  0725 03/12/23  0645   WBC 8.2 9.0 10.4   HGB 10.7* 11.1* 10.9*    287 295     BMP:    Recent Labs     03/11/23  0723 03/12/23  0645    139   K 4.1 4.5   CL 97* 98*   CO2 36* 36*   BUN 48* 34*   CREATININE 1.4* 1.2*   GLUCOSE 167* 172*     Hepatic:   Recent Labs     03/11/23  0723   AST 24   ALT 14 BILITOT <0.2   ALKPHOS 94       Discussed care with family    Problem List  Principal Problem:    Acute CVA (cerebrovascular accident) ([facility]  Active Problems:    Encephalopathy, metabolic    Elevated troponin    Acute renal failure superimposed on chronic kidney disease ([facility]    Hyponatremia  Resolved Problems:    * No resolved hospital problems. *       Assessment & Plan:   Acute on chronic renal failure d/t urinary retention   Cr improving  1.2 today  Carmona in place   Urology has been consulted   Nephrology is also following    Old lacunar infarct   Likely causing Recurrent falls, dizziness  CT head showed small lacunar infarct  MRI brain showed old lacunar infarct with out LvO  Unremarkable MRA brain   Working with PT/OT and Speech  On ASA Plavix and Lipitor    Oropharyngeal dysphagia   On Dysphagia diet     Obese BMI 32    Diet: ADULT DIET; Dysphagia - Soft and Bite Sized; 5 carb choices (75 gm/meal);  Low Fat/Low Chol/High Fiber/2 gm Na  Code:Full Code  DVT PPX      Aure Vargas MD   3/12/2023 8:17 PM

## 2023-03-13 NOTE — RT PROTOCOL NOTE
RT Inhaler-Nebulizer Bronchodilator Protocol Note    There is a bronchodilator order in the chart from a provider indicating to follow the RT Bronchodilator Protocol and there is an Initiate RT Inhaler-Nebulizer Bronchodilator Protocol order as well (see protocol at bottom of note). CXR Findings:  No results found. The findings from the last RT Protocol Assessment were as follows:   History Pulmonary Disease: Chronic pulmonary disease  Respiratory Pattern: Regular pattern and RR 12-20 bpm  Breath Sounds: Slightly diminished and/or crackles  Cough: Strong, spontaneous, non-productive  Indication for Bronchodilator Therapy: Decreased or absent breath sounds  Bronchodilator Assessment Score: 4    Aerosolized bronchodilator medication orders have been revised according to the RT Inhaler-Nebulizer Bronchodilator Protocol below. Respiratory Therapist to perform RT Therapy Protocol Assessment initially then follow the protocol. Repeat RT Therapy Protocol Assessment PRN for score 0-3 or on second treatment, BID, and PRN for scores above 3. No Indications - adjust the frequency to every 6 hours PRN wheezing or bronchospasm, if no treatments needed after 48 hours then discontinue using Per Protocol order mode. If indication present, adjust the RT bronchodilator orders based on the Bronchodilator Assessment Score as indicated below. Use Inhaler orders unless patient has one or more of the following: on home nebulizer, not able to hold breath for 10 seconds, is not alert and oriented, cannot activate and use MDI correctly, or respiratory rate 25 breaths per minute or more, then use the equivalent nebulizer order(s) with same Frequency and PRN reasons based on the score. If a patient is on this medication at home then do not decrease Frequency below that used at home.     0-3 - enter or revise RT bronchodilator order(s) to equivalent RT Bronchodilator order with Frequency of every 4 hours PRN for wheezing or increased work of breathing using Per Protocol order mode.        4-6 - enter or revise RT Bronchodilator order(s) to two equivalent RT bronchodilator orders with one order with BID Frequency and one order with Frequency of every 4 hours PRN wheezing or increased work of breathing using Per Protocol order mode.        7-10 - enter or revise RT Bronchodilator order(s) to two equivalent RT bronchodilator orders with one order with TID Frequency and one order with Frequency of every 4 hours PRN wheezing or increased work of breathing using Per Protocol order mode.       11-13 - enter or revise RT Bronchodilator order(s) to one equivalent RT bronchodilator order with QID Frequency and an Albuterol order with Frequency of every 4 hours PRN wheezing or increased work of breathing using Per Protocol order mode.      Greater than 13 - enter or revise RT Bronchodilator order(s) to one equivalent RT bronchodilator order with every 4 hours Frequency and an Albuterol order with Frequency of every 2 hours PRN wheezing or increased work of breathing using Per Protocol order mode.     RT to enter RT Home Evaluation for COPD & MDI Assessment order using Per Protocol order mode.    Electronically signed by Fatmata James RCP on 3/13/2023 at 1:06 AM

## 2023-03-13 NOTE — DISCHARGE SUMMARY
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 volume loss, most pronounced in the bilateral parietal lobes. There is mild T2/FLAIR hyperintensity in the periventricular and subcortical white matter, likely related to minimal chronic microvascular disease. There is old lacunar infarct in the right central miri. There is no acute infarct. No mass effect or midline shift. No acute intracranial hemorrhage. There is no hydrocephalus. The sellar/suprasellar regions appear unremarkable. The normal signal voids within the major intracranial vessels appear maintained. ORBITS: The visualized portion of the orbits demonstrate no acute abnormality. SINUSES: There are retention cysts versus polyps in the bilateral maxillary sinuses. There is scattered mild mucosal thickening in the paranasal sinuses. The bilateral mastoid air cells are clear. BONES/SOFT TISSUES: The bone marrow signal intensity appears normal. The soft tissues demonstrate no acute abnormality. MRA HEAD: Motion artifact ANTERIOR CIRCULATION: The internal carotid arteries are normal in course and caliber without focal stenosis. The anterior cerebral and middle cerebral arteries demonstrate no focal stenosis. POSTERIOR CIRCULATION: The posterior cerebral arteries demonstrate no focal stenosis. The vertebral and basilar arteries appear unremarkable. No intracranial aneurysm. MRA neck: Motion artifact. Common and internal carotid arteries: The bilateral common carotid arteries are within normal limits without flow limiting stenosis. The bilateral internal carotid arteries are within normal limits without flow limiting stenosis by NASCET criteria. There is no dissection or vascular injury. Vertebral arteries: The bilateral vertebral arteries are within normal limits without focal stenosis or dissection. No acute intracranial abnormality. Old lacunar infarct in the right central miri. Mild parenchymal volume loss. Minimal chronic microvascular disease. Motion artifact. Otherwise, unremarkable noncontrast MRA of the head. Motion artifact. No eyes, unremarkable noncontrast MRA of the neck. The patient was seen and examined on day of discharge and this discharge summary is in conjunction with any daily progress note from day of discharge. Time Spent on discharge is 45 minutes  in the examination, evaluation, counseling and review of medications and discharge plan. Note that more than 30 minutes was spent in preparing discharge papers, discussing discharge with patient, medication review, etc.       Signed:    Giuliano Mckeon MD   3/13/2023      Thank you [facility] for the opportunity to be involved in this patient's care.  If you have any questions or concerns please feel free to contact me at [facility]

## 2023-03-14 NOTE — TELEPHONE ENCOUNTER
Inpatient Chart reviewed. Nephrology made some medication changes. Was on Spironolactone 50mg 2 in the AM and 1 in the PM.  Was stopped by Dr. Palmer Pringle. Pt does not have nephrologist. Now seeing urologist for retention. Had jamison (Immediate return of 1600ml upon placement) Started Flomax. Jamison out. Metolazone was stopped and Torsemide was continued in the hospital.    wanting to make sure meds are correct. He has no follow up with nephrology. Creat at discharge 1.1. Offered 3:00 appt on 3/15 with SARAN.  would like appt.

## 2023-03-15 ENCOUNTER — OFFICE VISIT (OUTPATIENT)
Dept: CARDIOLOGY CLINIC | Age: 53
End: 2023-03-15

## 2023-03-15 ENCOUNTER — TELEPHONE (OUTPATIENT)
Dept: INTERNAL MEDICINE CLINIC | Age: 53
End: 2023-03-15

## 2023-03-15 VITALS
WEIGHT: 189 LBS | BODY MASS INDEX: 32.27 KG/M2 | HEART RATE: 73 BPM | SYSTOLIC BLOOD PRESSURE: 112 MMHG | HEIGHT: 64 IN | DIASTOLIC BLOOD PRESSURE: 58 MMHG | OXYGEN SATURATION: 93 %

## 2023-03-15 DIAGNOSIS — I50.32 CHRONIC HEART FAILURE WITH PRESERVED EJECTION FRACTION (HCC): Primary | ICD-10-CM

## 2023-03-15 DIAGNOSIS — I10 ESSENTIAL HYPERTENSION: ICD-10-CM

## 2023-03-15 DIAGNOSIS — E87.1 HYPONATREMIA: ICD-10-CM

## 2023-03-15 RX ORDER — TORSEMIDE 20 MG/1
20 TABLET ORAL 2 TIMES DAILY
Qty: 60 TABLET | Refills: 1 | Status: SHIPPED | OUTPATIENT
Start: 2023-03-15

## 2023-03-15 ASSESSMENT — ENCOUNTER SYMPTOMS
RESPIRATORY NEGATIVE: 1
GASTROINTESTINAL NEGATIVE: 1

## 2023-03-15 NOTE — TELEPHONE ENCOUNTER
Care Transitions Initial Follow Up Call    Outreach made within 2 business days of discharge: Yes    Patient: Chaka Robles Patient : 1970   MRN: [id number]  Reason for Admission: There are no discharge diagnoses documented for the most recent discharge.   Discharge Date: 3/13/23       Spoke with: Denver Golas

## 2023-03-15 NOTE — PROGRESS NOTES
[address]   Congestive Heart Failure    PrimaryCare Doctor:  Rolly Boo DO      Chief Complaint:  CHF    History of Present Illness:  Juan Carrillo is a 47 y.o. female with PMH breast cancer, non obstructive CAD, HFpEF who presents today to discuss phone call below:   Phone Call 3/14/23: hospitalization 3/9-3/13 for possible CVA, had MARIA D, hyponatreia. Nephrology made some medication changes. Was on Spironolactone 50mg 2 in the AM and 1 in the PM.  Was stopped by Dr. Mary Daniels. Pt does not have nephrologist. Now seeing urologist for retention. Had jamison (Immediate return of 1600ml upon placement) Started Flomax. Jamison out. Metolazone was stopped and Torsemide was continued in the hospital but dose decreased  Creat at discharge 1.1. Today: She is feeling better, her wt is much better than last time I saw her and has been stable since discharge. She denies chest pain, dyspnea, orthopnea, PND, exertional chest pressure/discomfort, early saiety, syncope. Edema is improved  Home Scale: 186-187      Baseline Weight: 190  Wt Readings from Last 3 Encounters:   03/15/23 189 lb (85.7 kg)   03/12/23 187 lb (84.8 kg)   01/26/23 202 lb (91.6 kg)        EF: 55-60%  Cardiac Imaging:Echo 10/3/22:   Summary   Normal left ventricle size, wall thickness and systolic function with an   estimated ejection fraction of 55-60%. No regional wall motion abnormalities are seen. Diastolic filling parameters suggests grade I diastolic dysfunction. Mild mitral regurgitation. Unable to estimate pulmonary artery pressure secondary to incomplete TR jet   envelope. Stress Test 7-1-2020   There is normal isotope uptake at stress and rest. There is no evidence of     myocardial ischemia or scar. Normal LV function. Left ventricular ejection fraction of 59 %. Overall findings represent a low risk scan. Stress Protocols          Resting ECG     Normal sinus rhythm.          Echo 11/8/2017:  Normal left ventricle size and systolic function with an estimated ejection fraction of 60%. No regional wall motion abnormalities are seen. There is borderline concentric left ventricular hypertrophy. E/e\"= 13     VQ scan 1/11/2018:  Normal study. No evidence of pulmonary embolus. Left Heart Cath 2/27/18:  Dominance : Right     LM: bifurcating, MLI  LAD: mild plaquing with small vessel disease distally   LCx: no significant disease  RCA: MLI     LVEDP: 25 mmHg   No Ao gradient     Right Heart Cath   RA: 13  RV: 47/6/16  PA:   46/19    and mean of 32  PCWP: 21 mmHg      Sats:  Ao: 92%  RA: 61%  PA: 62% (x 2)     CO/CI : 6.1 L      Activity: at baseline  Can you walk 1-2 blocks or do a moderate amount of house/yard work? No      NYHA Class: II     Sodium Restrictions: 2g  Fluid Restrictions: 48-64 oz/day  Sodium and fluid restriction compliance: fair    Pt Education: The patient has received education on the following topics: dietary sodium restriction, heart failure medications, the importance of physical activity, symptom management and weight monitoring         Past Medical History:   has a past medical history of Anxiety, Anxiety and depression, Arthritis, Asthma, CAD (coronary artery disease), Cancer ([address] Cerebral artery occlusion with cerebral infarction ([address] CHF (congestive heart failure) ([address] Chronic kidney disease, Chronic pain, Constipation, COPD (chronic obstructive pulmonary disease) ([address] Depression, Diabetes mellitus ([address] Diabetic polyneuropathy associated with type 2 diabetes mellitus ([address] Dysthymia, ESBL (extended spectrum beta-lactamase) producing bacteria infection, Fibromyalgia, Gastric ulcer, unspecified as acute or chronic, without mention of hemorrhage, perforation, or obstruction, GERD (gastroesophageal reflux disease), Gout, HIGH CHOLESTEROL, Hypertension, Hypothyroidism, Pneumonia, Pyelonephritis, Severe persistent asthma without complication, Thyroid disease, and TIA (transient ischemic attack). Surgical History:   has a past surgical history that includes Breast surgery; Hysterectomy (2005); Carpal tunnel release; Tonsillectomy; Finger contracture surgery; Upper gastrointestinal endoscopy (); and Mastectomy. Social History:   reports that she has never smoked. She has never used smokeless tobacco. She reports that she does not drink alcohol and does not use drugs. Family History:   Family History   Problem Relation Age of Onset    Asthma Other     Cancer Other     Depression Other     Diabetes Other     Hypertension Other     High Cholesterol Other     Migraines Other     Heart Attack Father 72         of MI    High Blood Pressure Mother     Diabetes Mother        HomeMedications:  Prior to Admission medications    Medication Sig Start Date End Date Taking? Authorizing Provider   clopidogrel (PLAVIX) 75 MG tablet Take 1 tablet by mouth daily 3/14/23   Rambo Perez MD   tamsulosin (FLOMAX) 0.4 MG capsule Take 1 capsule by mouth daily 3/13/23   Rambo Perez MD   torsemide (DEMADEX) 20 MG tablet Take 1 tablet by mouth daily 3/14/23   Rambo Perez MD   levothyroxine (SYNTHROID) 150 MCG tablet TAKE 1 TABLET BY MOUTH IN THE MORNING before breakfast 23   Theresa Lee MD   leflunomide (ARAVA) 20 MG tablet TAKE 1 TABLET BY MOUTH EVERY DAY 23   Morenita Barrett MD   vitamin D3 (CHOLECALCIFEROL) 25 MCG (1000 UT) TABS tablet TAKE 3 TABLETS BY MOUTH ONE TIME A DAY 23   Theresa Lee MD   lubiprostone (AMITIZA) 24 MCG capsule TAKE 1 CAPSULE BY MOUTH TWO TIMES A DAY WITH MEALS 23   Wasatch Lot, DO   ferrous sulfate (FEROSUL) 325 (65 Fe) MG tablet TAKE 1 TABLET BY MOUTH TWO TIMES A DAY WITH MEALS 23   Wasatch Lot, DO   mineral oil-hydrophilic petrolatum (HYDROPHOR) ointment Apply topically 4 times daily as needed to hands and feet.  23   Wasatch Lot, DO   Compression Sleeves Left arm 23   Wasatch Lot, DO   Elastic Bandages & Supports (COMPRESSION & SUPPORT GLOVES L) MISC 1 each by Does not apply route daily Left hand 23   Ana Pear, DO   Elastic Bandages & Supports (MEDICAL COMPRESSION STOCKINGS) [address] [facility] 2 each by Does not apply route daily On am/off HS. 23   Ana Pear, DO   Urea 40 % LOTN Apply to feet nightly and cover with Aquaphor 23   Ana Thomas, DO   sulfaSALAzine (AZULFIDINE) 500 MG tablet TAKE 1 TABLET BY MOUTH TWO TIMES A DAY 12/15/22   More Anglin MD   omeprazole (PRILOSEC) 20 MG delayed release capsule TAKE 1 CAPSULE BY MOUTH TWO TIMES A DAY 10/26/22   Ana Thomas, DO   nitroGLYCERIN (NITROSTAT) 0.4 MG SL tablet PLACE ONE TABLET UNDER TONGUE AS NEEDED FOR CHEST PAIN, MAY REPEAT EVERY 5 MINUTES AS NEEDED UP TO A MAX OF 3 TABLETS. IF NO RELIEF AFTER 1 DOSE, CALL 911. 10/3/22   Gurmeet Richard MD   metoprolol tartrate (LOPRESSOR) 25 MG tablet TAKE 1 TABLET BY MOUTH TWO TIMES A DAY 22   Gurmeet Richard MD   pregabalin (LYRICA) 75 MG capsule TAKE 1 CAPSULE BY MOUTH TWO TIMES A DAY  Patient taking differently: 75 mg.  TAKE 1 CAPSULE BY MOUTH TWO TIMES A DAY    Took this morning on 3/9/23 - pt &  state not  - refilled by MD last week 9/14/22 3/1/23  More Anglin MD   SM SENNA-S 8.6-50 MG per tablet TAKE 2 TABLETS BY MOUTH TWO TIMES A DAY 22   Ana Thomas,    meclizine (ANTIVERT) 25 MG tablet Take 1 tablet by mouth 3 times daily as needed for Dizziness 22   Ana Thomas, DO   montelukast (SINGULAIR) 10 MG tablet Take 1 tablet by mouth nightly 22   Pantera Fan MD   TRESIBA FLEXTOUCH 200 UNIT/ML SOPN INJECT 200 UNITS SUBCUTANEOUSLY ONE TIME DAILY 22   Trevor Bertrand MD   Continuous Blood Gluc Sensor (FREESTYLE EMERALD 2 SENSOR) MISC Change every 14 days 22   Trevor Bertrand MD   insulin aspart (NOVOLOG FLEXPEN) 100 UNIT/ML injection pen inject 30 to 50 units into the skin three time daily before meals 22   Trevor Bertrand MD   Insulin Pen Needle (B-D UF III MINI PEN NEEDLES) 31G X 5 MM MISC use five times daily with insulin 7/27/22   Emerson Be MD   STEGLATRO 5 MG TABS TAKE 1 TABLET BY MOUTH EVERY DAY 7/13/22   Emerson Be MD   Blood Glucose Monitoring Suppl (Veronica Griggsmarcial) w/Device KIT Use to check glucose 4 times daily. 7/12/22   Emerson Be MD   Lidocaine 5 % CREA Apply to feet QID prn pain for peripheral neuropathy 6/21/22   William Lomas DO   butalbital-acetaminophen-caffeine [facility]) -09 MG per tablet Take 1 tablet by mouth every 6 hours as needed for Headaches 6/21/22   William Lomas DO   ranolazine (RANEXA) 500 MG extended release tablet TAKE 1 TABLET BY MOUTH TWO TIMES A DAY 6/6/22   Esperanza Collado MD   Compression Stockings MISC by Does not apply route Zippered stockings for daily use on lower extremities (do not wear at night). 20-30mmHg.  4/20/22   Esperanza Collado MD   [facility] [address] VERIO strip use to test blood sugar 5 times daily 4/14/22   Emerson Be MD   Continuous Blood Gluc  (FREESTYLE EMERALD 2 READER) MINDY To check glucose levels 3/15/22   Emerson Be MD   albuterol (PROVENTIL) (2.5 MG/3ML) 0.083% nebulizer solution Take 3 mLs by nebulization every 6 hours as needed for Wheezing or Shortness of Breath 1/26/22   William Lomas DO   polyethylene glycol (GLYCOLAX) 17 GM/SCOOP powder TAKE 17 GRAMS (1 CAPFUL) BY MOUTH NIGHTLY 8/12/21   Layne Guerra MD   LINZESS 290 MCG CAPS capsule TAKE 1 CAPSULE BY MOUTH ONE TIME A DAY FOR 90 DAYS 6/25/21   Historical Provider, MD   REXULTI 2 MG TABS tablet Take 2 mg by mouth at bedtime  7/21/21   Historical Provider, MD   methocarbamol (ROBAXIN-750) 750 MG tablet Take 1 tablet by mouth 3 times daily as needed (pain and spasm) 7/26/21   Griselda Albert MD   nystatin (MYCOSTATIN) 713868 UNIT/ML suspension Take 5 mLs by mouth 4 times daily 11/18/20   William Lomas DO   traZODone (DESYREL) 50 MG tablet Take 1 tablet by mouth nightly Take it on the day of sleep study 11/16/20   Jovanny Fairbanks MD   loratadine (CLARITIN) 10 MG tablet TAKE 1 TABLET BY MOUTH ONE TIME A DAY  10/5/20   Oli Perez DO   diazePAM (VALIUM) 5 MG tablet Take 5 mg by mouth 2 times daily as needed for Anxiety. Historical Provider, MD   OXYGEN Inhale 2 L into the lungs nightly Sometimes with activity    Historical Provider, MD   oxyCODONE (OXYCONTIN) 20 MG extended release tablet Take 20 mg by mouth every 12 hours. Historical Provider, MD   aspirin 81 MG EC tablet Take 81 mg by mouth daily    Historical Provider, MD   benztropine (COGENTIN) 1 MG tablet Take 1 mg by mouth nightly     Historical Provider, MD   oxyCODONE-acetaminophen (PERCOCET)  MG per tablet Take 1 tablet by mouth every 4 hours as needed for Pain . Earliest Fill Date: 6/8/17 6/8/17   HERMES Reyes - CNP   duloxetine (CYMBALTA) 60 MG capsule Take 60 mg by mouth 2 times daily. Historical Provider, MD        Allergies: Iv dye [iodides], Diazepam, Insulin glargine, and Trazodone and nefazodone     ROS:   Review of Systems   Constitutional:  Positive for fatigue. Respiratory: Negative. Cardiovascular: Negative. Gastrointestinal: Negative. Genitourinary: Negative. Musculoskeletal: Negative. Skin: Negative. Neurological: Negative. Hematological: Negative. Psychiatric/Behavioral: Negative. Physical Examination:    Vitals:    03/15/23 1531   BP: (!) 112/58   Pulse: 73   SpO2: 93%   Weight: 189 lb (85.7 kg)   Height: 5' 4\" (1.626 m)           Physical Exam  Constitutional:       Appearance: She is well-developed. HENT:      Head: Normocephalic and atraumatic. Eyes:      Pupils: Pupils are equal, round, and reactive to light. Cardiovascular:      Rate and Rhythm: Normal rate and regular rhythm. Heart sounds: Normal heart sounds. Pulmonary:      Effort: Pulmonary effort is normal.      Breath sounds: Normal breath sounds.    Abdominal:      Palpations: Abdomen is soft.   Musculoskeletal:         General: Normal range of motion. Cervical back: Normal range of motion and neck supple. Right lower leg: Edema present. Left lower leg: Edema present. Comments: Both hands, L>R   Skin:     General: Skin is warm and dry. Neurological:      Mental Status: She is alert and oriented to person, place, and time. Psychiatric:         Behavior: Behavior normal.         Thought Content:  Thought content normal.         Judgment: Judgment normal.       Lab Data:    CBC:   Lab Results   Component Value Date/Time    WBC 13.6 03/13/2023 08:05 AM    WBC 10.4 03/12/2023 06:45 AM    WBC 9.0 03/11/2023 07:25 AM    RBC 3.99 03/13/2023 08:05 AM    RBC 3.82 03/12/2023 06:45 AM    RBC 3.92 03/11/2023 07:25 AM    RBC 3.57 05/16/2017 03:47 PM    RBC 3.99 02/07/2017 03:47 PM    RBC 3.70 11/08/2016 03:01 PM    HGB 11.4 03/13/2023 08:05 AM    HGB 10.9 03/12/2023 06:45 AM    HGB 11.1 03/11/2023 07:25 AM    HCT 35.5 03/13/2023 08:05 AM    HCT 33.7 03/12/2023 06:45 AM    HCT 34.3 03/11/2023 07:25 AM    MCV 88.8 03/13/2023 08:05 AM    MCV 88.4 03/12/2023 06:45 AM    MCV 87.5 03/11/2023 07:25 AM    RDW 13.6 03/13/2023 08:05 AM    RDW 13.2 03/12/2023 06:45 AM    RDW 13.2 03/11/2023 07:25 AM     03/13/2023 08:05 AM     03/12/2023 06:45 AM     03/11/2023 07:25 AM     BMP:  Lab Results   Component Value Date/Time     03/13/2023 08:05 AM     03/12/2023 06:45 AM     03/11/2023 07:23 AM    K 4.6 03/13/2023 08:05 AM    K 4.5 03/12/2023 06:45 AM    K 4.1 03/11/2023 07:23 AM    K 4.6 03/09/2023 03:44 PM    K 4.6 02/16/2023 02:23 PM    K 4.4 10/29/2020 04:29 AM     03/13/2023 08:05 AM    CL 98 03/12/2023 06:45 AM    CL 97 03/11/2023 07:23 AM    CO2 35 03/13/2023 08:05 AM    CO2 36 03/12/2023 06:45 AM    CO2 36 03/11/2023 07:23 AM    PHOS 3.4 02/07/2019 12:25 PM    PHOS 3.1 08/11/2018 02:00 PM    PHOS 3.2 03/25/2018 05:44 AM    BUN 29 03/13/2023 08:05 AM BUN 34 03/12/2023 06:45 AM    BUN 48 03/11/2023 07:23 AM    CREATININE 1.1 03/13/2023 08:05 AM    CREATININE 1.2 03/12/2023 06:45 AM    CREATININE 1.4 03/11/2023 07:23 AM     BNP:   Lab Results   Component Value Date/Time    PROBNP 129 03/09/2023 03:44 PM    PROBNP 110 02/16/2023 02:23 PM    PROBNP 192 01/19/2023 02:42 PM         Assessment/Plan:    Encounter Diagnoses        Chronic heart failure with preserved ejection fraction (HCC) Compensated, increase daily torsemide and skip IV lasix tomorrow. keep holding metolazone and lolita, labs monday    Essential hypertension controlled    SOB (shortness of breath) stable    Localized edema stable         Instructions:   Medications:  increase torsemide to 20mg twice a day, do not go for IV lasix tomorrow, stay off lolita for now  Labs: before you see Dr. Josselin Calvert  Follow up: Monday with Dr. Marshall Comp: [phone]      I appreciate the opportunity of cooperating in the care of this individual.    HERMES Avery - CNP, CNP, 3/15/2023,8:15 AM

## 2023-03-15 NOTE — PATIENT INSTRUCTIONS
Instructions:   Medications:  increase torsemide to 20mg twice a day, do not go for IV lasix tomorrow, stay off lolita for now  Labs: before you see Dr. Parsons  Follow up: Monday with Dr. Parsons      [place] [facility] Line: [phone]

## 2023-03-16 ENCOUNTER — HOSPITAL ENCOUNTER (OUTPATIENT)
Dept: ONCOLOGY | Age: 53
Setting detail: INFUSION SERIES
Discharge: HOME | End: 2023-03-16

## 2023-03-16 DIAGNOSIS — I50.32 CHRONIC HEART FAILURE WITH PRESERVED EJECTION FRACTION (HCC): ICD-10-CM

## 2023-03-16 LAB
ANION GAP SERPL CALCULATED.3IONS-SCNC: 16 MMOL/L (ref 3–16)
BUN SERPL-MCNC: 31 MG/DL (ref 7–20)
CALCIUM SERPL-MCNC: 9.4 MG/DL (ref 8.3–10.6)
CHLORIDE SERPL-SCNC: 94 MMOL/L (ref 99–110)
CO2 SERPL-SCNC: 27 MMOL/L (ref 21–32)
CREAT SERPL-MCNC: 1.7 MG/DL (ref 0.6–1.1)
GFR SERPLBLD CREATININE-BSD FMLA CKD-EPI: 35 ML/MIN/{1.73_M2}
GLUCOSE SERPL-MCNC: 186 MG/DL (ref 70–99)
NT-PROBNP SERPL-MCNC: 230 PG/ML (ref 0–124)
POTASSIUM SERPL-SCNC: 4.3 MMOL/L (ref 3.5–5.1)
SODIUM SERPL-SCNC: 137 MMOL/L (ref 136–145)

## 2023-03-17 ENCOUNTER — TELEPHONE (OUTPATIENT)
Dept: CARDIOLOGY CLINIC | Age: 53
End: 2023-03-17

## 2023-03-17 DIAGNOSIS — I25.10 CORONARY ARTERY DISEASE INVOLVING NATIVE CORONARY ARTERY OF NATIVE HEART WITHOUT ANGINA PECTORIS: Chronic | ICD-10-CM

## 2023-03-17 DIAGNOSIS — I50.30 (HFPEF) HEART FAILURE WITH PRESERVED EJECTION FRACTION (HCC): Chronic | ICD-10-CM

## 2023-03-17 DIAGNOSIS — I50.22 SYSTOLIC CHF, CHRONIC (HCC): ICD-10-CM

## 2023-03-17 DIAGNOSIS — I10 ESSENTIAL HYPERTENSION: ICD-10-CM

## 2023-03-17 DIAGNOSIS — N28.9 RENAL INSUFFICIENCY: Chronic | ICD-10-CM

## 2023-03-17 DIAGNOSIS — R06.02 SOB (SHORTNESS OF BREATH): Chronic | ICD-10-CM

## 2023-03-17 RX ORDER — SPIRONOLACTONE 25 MG/1
25 TABLET ORAL DAILY
Qty: 30 TABLET | Refills: 0 | OUTPATIENT
Start: 2023-03-17

## 2023-03-17 NOTE — TELEPHONE ENCOUNTER
----- Message from HERMES Shaikh - CNP sent at 3/17/2023  8:29 AM EDT -----  Labs ok, restart lolita 25mg once a day.  Pavan Ohms

## 2023-03-17 NOTE — PROGRESS NOTES
soft  No masses or tenderness  Liver/Spleen: No Abnormalities Noted  Neurological/Psychiatric:  Alert and oriented in all spheres  Moves all extremities well  Exhibits normal gait balance and coordination  No abnormalities of mood, affect, memory, mentation, or behavior are noted    Diagnostics:    Stress Test 7-1-2020   There is normal isotope uptake at stress and rest. There is no evidence of     myocardial ischemia or scar. Normal LV function. Left ventricular ejection fraction of 59 %. Overall findings represent a low risk scan. Stress Protocols          Resting ECG     Normal sinus rhythm. Left Heart Cath 2/27/18:  Dominance : Right     LM: bifurcating, MLI  LAD: mild plaquing with small vessel disease distally   LCx: no significant disease  RCA: MLI     LVEDP: 25 mmHg   No Ao gradient     Right Heart Cath   RA: 13  RV: 47/6/16  PA:   46/19    and mean of 32  PCWP: 21 mmHg      Sats:  Ao: 92%  RA: 61%  PA: 62% (x 2)   CO/CI : 6.1 L    CXR 1/15/18:  No acute cardiopulmonary disease     Echo 11/8/2017:  Normal left ventricle size and systolic function with an estimated ejection   fraction of 60%. No regional wall motion abnormalities are seen. There is borderline concentric left ventricular hypertrophy. E/e\"= 13     Stress test 11/8/2017: There is normal isotope uptake at stress and rest. There is no evidence of  myocardial ischemia or scar. Normal LV function. Overall findings represent a low risk scan. VQ scan negative 1/11/2018    CXR 1/16/18  No acute cardiopulmonary disease     Echo 11/8/2017:  Normal left ventricle size and systolic function with an estimated ejection fraction of 60%. No regional wall motion abnormalities are seen. There is borderline concentric left ventricular hypertrophy. E/e\"= 13     Stress test 11/8/2017: There is normal isotope uptake at stress and rest. There is no evidence of  myocardial ischemia or scar. Normal LV function.   Overall

## 2023-03-20 ENCOUNTER — HOSPITAL ENCOUNTER (OUTPATIENT)
Age: 53
Discharge: HOME | End: 2023-03-20
Payer: COMMERCIAL

## 2023-03-20 ENCOUNTER — HOSPITAL ENCOUNTER (OUTPATIENT)
Dept: ONCOLOGY | Age: 53
Setting detail: INFUSION SERIES
Discharge: HOME | End: 2023-03-20
Payer: COMMERCIAL

## 2023-03-20 ENCOUNTER — OFFICE VISIT (OUTPATIENT)
Dept: CARDIOLOGY CLINIC | Age: 53
End: 2023-03-20

## 2023-03-20 ENCOUNTER — TELEPHONE (OUTPATIENT)
Dept: INTERNAL MEDICINE CLINIC | Age: 53
End: 2023-03-20

## 2023-03-20 VITALS
HEART RATE: 79 BPM | DIASTOLIC BLOOD PRESSURE: 56 MMHG | OXYGEN SATURATION: 93 % | SYSTOLIC BLOOD PRESSURE: 116 MMHG | BODY MASS INDEX: 34.83 KG/M2 | WEIGHT: 204 LBS | HEIGHT: 64 IN

## 2023-03-20 VITALS
HEART RATE: 66 BPM | TEMPERATURE: 96.8 F | DIASTOLIC BLOOD PRESSURE: 65 MMHG | RESPIRATION RATE: 18 BRPM | SYSTOLIC BLOOD PRESSURE: 116 MMHG

## 2023-03-20 DIAGNOSIS — E89.89 LYMPHEDEMA OF UPPER EXTREMITY FOLLOWING LYMPHADENECTOMY: ICD-10-CM

## 2023-03-20 DIAGNOSIS — E87.1 HYPONATREMIA: ICD-10-CM

## 2023-03-20 DIAGNOSIS — I25.10 CORONARY ARTERY DISEASE INVOLVING NATIVE CORONARY ARTERY OF NATIVE HEART WITHOUT ANGINA PECTORIS: Chronic | ICD-10-CM

## 2023-03-20 DIAGNOSIS — I10 ESSENTIAL HYPERTENSION: ICD-10-CM

## 2023-03-20 DIAGNOSIS — I50.32 CHRONIC HEART FAILURE WITH PRESERVED EJECTION FRACTION (HCC): Primary | ICD-10-CM

## 2023-03-20 DIAGNOSIS — I89.0 LYMPHEDEMA OF UPPER EXTREMITY FOLLOWING LYMPHADENECTOMY: ICD-10-CM

## 2023-03-20 DIAGNOSIS — N18.4 CKD (CHRONIC KIDNEY DISEASE) STAGE 4, GFR 15-29 ML/MIN (HCC): ICD-10-CM

## 2023-03-20 DIAGNOSIS — I50.32 CHRONIC HEART FAILURE WITH PRESERVED EJECTION FRACTION (HCC): ICD-10-CM

## 2023-03-20 DIAGNOSIS — I50.22 SYSTOLIC CHF, CHRONIC (HCC): ICD-10-CM

## 2023-03-20 DIAGNOSIS — N28.9 RENAL INSUFFICIENCY: Chronic | ICD-10-CM

## 2023-03-20 DIAGNOSIS — R06.02 SOB (SHORTNESS OF BREATH): ICD-10-CM

## 2023-03-20 LAB
ALBUMIN SERPL-MCNC: 3.7 G/DL (ref 3.4–5)
ALBUMIN/GLOB SERPL: 1 {RATIO} (ref 1.1–2.2)
ALP SERPL-CCNC: 94 U/L (ref 40–129)
ALT SERPL-CCNC: 10 U/L (ref 10–40)
ANION GAP SERPL CALCULATED.3IONS-SCNC: 11 MMOL/L (ref 3–16)
AST SERPL-CCNC: 10 U/L (ref 15–37)
BILIRUB SERPL-MCNC: <0.2 MG/DL (ref 0–1)
BUN SERPL-MCNC: 38 MG/DL (ref 7–20)
CALCIUM SERPL-MCNC: 8.8 MG/DL (ref 8.3–10.6)
CHLORIDE SERPL-SCNC: 103 MMOL/L (ref 99–110)
CO2 SERPL-SCNC: 24 MMOL/L (ref 21–32)
CREAT SERPL-MCNC: 1.5 MG/DL (ref 0.6–1.1)
DEPRECATED RDW RBC AUTO: 13.5 % (ref 12.4–15.4)
GFR SERPLBLD CREATININE-BSD FMLA CKD-EPI: 41 ML/MIN/{1.73_M2}
GLUCOSE SERPL-MCNC: 365 MG/DL (ref 70–99)
HCT VFR BLD AUTO: 33.7 % (ref 36–48)
HGB BLD-MCNC: 10.8 G/DL (ref 12–16)
MCH RBC QN AUTO: 28.6 PG (ref 26–34)
MCHC RBC AUTO-ENTMCNC: 32.1 G/DL (ref 31–36)
MCV RBC AUTO: 89.2 FL (ref 80–100)
NT-PROBNP SERPL-MCNC: 405 PG/ML (ref 0–124)
PLATELET # BLD AUTO: 282 K/UL (ref 135–450)
PMV BLD AUTO: 9.3 FL (ref 5–10.5)
POTASSIUM SERPL-SCNC: 4.3 MMOL/L (ref 3.5–5.1)
PROT SERPL-MCNC: 7.4 G/DL (ref 6.4–8.2)
RBC # BLD AUTO: 3.78 M/UL (ref 4–5.2)
SODIUM SERPL-SCNC: 138 MMOL/L (ref 136–145)
WBC # BLD AUTO: 8.1 K/UL (ref 4–11)

## 2023-03-20 PROCEDURE — 36415 COLL VENOUS BLD VENIPUNCTURE: CPT

## 2023-03-20 PROCEDURE — 2580000003 HC RX 258: Performed by: INTERNAL MEDICINE

## 2023-03-20 PROCEDURE — 83880 ASSAY OF NATRIURETIC PEPTIDE: CPT

## 2023-03-20 PROCEDURE — 6360000002 HC RX W HCPCS: Performed by: INTERNAL MEDICINE

## 2023-03-20 PROCEDURE — 96374 THER/PROPH/DIAG INJ IV PUSH: CPT

## 2023-03-20 PROCEDURE — 99211 OFF/OP EST MAY X REQ PHY/QHP: CPT

## 2023-03-20 PROCEDURE — 80053 COMPREHEN METABOLIC PANEL: CPT

## 2023-03-20 PROCEDURE — 85027 COMPLETE CBC AUTOMATED: CPT

## 2023-03-20 RX ORDER — TORSEMIDE 100 MG/1
50 TABLET ORAL 2 TIMES DAILY
Qty: 30 TABLET | Refills: 0
Start: 2023-03-20

## 2023-03-20 RX ORDER — FUROSEMIDE 10 MG/ML
120 INJECTION INTRAMUSCULAR; INTRAVENOUS ONCE
Status: CANCELLED
Start: 2023-03-20 | End: 2023-03-20

## 2023-03-20 RX ORDER — SODIUM CHLORIDE 0.9 % (FLUSH) 0.9 %
5-40 SYRINGE (ML) INJECTION ONCE
Status: CANCELLED
Start: 2023-03-30 | End: 2023-03-30

## 2023-03-20 RX ORDER — SPIRONOLACTONE 25 MG/1
50 TABLET ORAL 2 TIMES DAILY
Qty: 30 TABLET | Refills: 0
Start: 2023-03-20

## 2023-03-20 RX ORDER — SODIUM CHLORIDE 0.9 % (FLUSH) 0.9 %
5-40 SYRINGE (ML) INJECTION ONCE
Status: COMPLETED | OUTPATIENT
Start: 2023-03-20 | End: 2023-03-20

## 2023-03-20 RX ORDER — FUROSEMIDE 10 MG/ML
80 INJECTION INTRAMUSCULAR; INTRAVENOUS ONCE
Status: COMPLETED | OUTPATIENT
Start: 2023-03-20 | End: 2023-03-20

## 2023-03-20 RX ADMIN — Medication 10 ML: at 15:01

## 2023-03-20 RX ADMIN — FUROSEMIDE 80 MG: 10 INJECTION, SOLUTION INTRAMUSCULAR; INTRAVENOUS at 15:00

## 2023-03-20 NOTE — PROGRESS NOTES
Pt ambulatory to infusion center for add on Lasix IVP. IV access obtained. Labs drawn in outpatient lab already. IVP Lasix administered; 80 mg IVP given over 4 minutes. IV removed. Pt/spouse denied need for printed AVS. Pt to return on 3/30 for same treatment. New orders for patient to received lasiix every other week, instead of weekly and to start on 3/30/23.

## 2023-03-20 NOTE — PATIENT INSTRUCTIONS
PLAN:  All cardiac test and lab results personally reviewed by me during this office visit. 1.   STAT Labs now at Outpatient lab  2. Go to the Infusion Center at 1:30pm and Dr. Josselin Calvert will call a dose of Lasix to be given or have you go to the ED. Addendum: [address] to give Lasix 80mg IVP today  Increase Torsemide to 50mg twice a day  Increase Spironolactone to 50mg twice a day. Get Labs WEEKLY for 12 weeks. Get Lasix 120mg IVP Every Other Week in the infusion Center. Hold IV Lasix IF Weight is down 10 pounds to 194.    See Dr. Josselin Calvert in 3 months

## 2023-03-22 DIAGNOSIS — M1A.09X0 IDIOPATHIC CHRONIC GOUT OF MULTIPLE SITES WITHOUT TOPHUS: ICD-10-CM

## 2023-03-22 DIAGNOSIS — M51.36 DDD (DEGENERATIVE DISC DISEASE), LUMBAR: ICD-10-CM

## 2023-03-22 DIAGNOSIS — M06.00 SERONEGATIVE RHEUMATOID ARTHRITIS (HCC): ICD-10-CM

## 2023-03-23 ENCOUNTER — OFFICE VISIT (OUTPATIENT)
Dept: INTERNAL MEDICINE CLINIC | Age: 53
End: 2023-03-23
Payer: COMMERCIAL

## 2023-03-23 VITALS
SYSTOLIC BLOOD PRESSURE: 118 MMHG | WEIGHT: 201.6 LBS | HEIGHT: 64 IN | HEART RATE: 76 BPM | DIASTOLIC BLOOD PRESSURE: 72 MMHG | BODY MASS INDEX: 34.42 KG/M2

## 2023-03-23 DIAGNOSIS — Z85.3 HISTORY OF BREAST CANCER: ICD-10-CM

## 2023-03-23 DIAGNOSIS — Z09 HOSPITAL DISCHARGE FOLLOW-UP: Primary | ICD-10-CM

## 2023-03-23 DIAGNOSIS — I89.0 LYMPHEDEMA OF UPPER EXTREMITY FOLLOWING LYMPHADENECTOMY: ICD-10-CM

## 2023-03-23 DIAGNOSIS — I25.10 CORONARY ARTERY DISEASE DUE TO LIPID RICH PLAQUE: ICD-10-CM

## 2023-03-23 DIAGNOSIS — I50.32 CHRONIC HEART FAILURE WITH PRESERVED EJECTION FRACTION (HCC): ICD-10-CM

## 2023-03-23 DIAGNOSIS — E89.89 LYMPHEDEMA OF UPPER EXTREMITY FOLLOWING LYMPHADENECTOMY: ICD-10-CM

## 2023-03-23 DIAGNOSIS — E11.42 DIABETIC POLYNEUROPATHY ASSOCIATED WITH TYPE 2 DIABETES MELLITUS (HCC): ICD-10-CM

## 2023-03-23 DIAGNOSIS — R42 VERTIGO: ICD-10-CM

## 2023-03-23 DIAGNOSIS — I10 ESSENTIAL HYPERTENSION: ICD-10-CM

## 2023-03-23 DIAGNOSIS — N18.32 ACUTE RENAL FAILURE SUPERIMPOSED ON STAGE 3B CHRONIC KIDNEY DISEASE, UNSPECIFIED ACUTE RENAL FAILURE TYPE (HCC): ICD-10-CM

## 2023-03-23 DIAGNOSIS — I89.0 LYMPHEDEMA: ICD-10-CM

## 2023-03-23 DIAGNOSIS — N17.9 ACUTE RENAL FAILURE SUPERIMPOSED ON STAGE 3B CHRONIC KIDNEY DISEASE, UNSPECIFIED ACUTE RENAL FAILURE TYPE (HCC): ICD-10-CM

## 2023-03-23 DIAGNOSIS — I63.9 ACUTE CVA (CEREBROVASCULAR ACCIDENT) (HCC): ICD-10-CM

## 2023-03-23 DIAGNOSIS — D50.9 IRON DEFICIENCY ANEMIA, UNSPECIFIED IRON DEFICIENCY ANEMIA TYPE: ICD-10-CM

## 2023-03-23 DIAGNOSIS — E11.65 POORLY CONTROLLED TYPE 2 DIABETES MELLITUS (HCC): ICD-10-CM

## 2023-03-23 DIAGNOSIS — N28.9 RENAL INSUFFICIENCY: ICD-10-CM

## 2023-03-23 DIAGNOSIS — I25.83 CORONARY ARTERY DISEASE DUE TO LIPID RICH PLAQUE: ICD-10-CM

## 2023-03-23 PROCEDURE — 1111F DSCHRG MED/CURRENT MED MERGE: CPT | Performed by: NURSE PRACTITIONER

## 2023-03-23 PROCEDURE — 99214 OFFICE O/P EST MOD 30 MIN: CPT | Performed by: NURSE PRACTITIONER

## 2023-03-23 RX ORDER — FEBUXOSTAT 80 MG/1
80 TABLET, FILM COATED ORAL DAILY
Qty: 30 TABLET | Refills: 0 | Status: SHIPPED | OUTPATIENT
Start: 2023-03-23 | End: 2023-04-22

## 2023-03-23 RX ORDER — POLYETHYLENE GLYCOL 3350 17 G/17G
POWDER, FOR SOLUTION ORAL
Qty: 510 G | Refills: 0 | Status: SHIPPED | OUTPATIENT
Start: 2023-03-23

## 2023-03-23 RX ORDER — ROSUVASTATIN CALCIUM 10 MG/1
10 TABLET, COATED ORAL NIGHTLY
Qty: 30 TABLET | Refills: 3 | Status: SHIPPED | OUTPATIENT
Start: 2023-03-23

## 2023-03-23 RX ORDER — PREGABALIN 75 MG/1
CAPSULE ORAL
Qty: 60 CAPSULE | Refills: 0 | Status: SHIPPED | OUTPATIENT
Start: 2023-03-23 | End: 2023-04-22

## 2023-03-23 RX ORDER — MECLIZINE HYDROCHLORIDE 25 MG/1
25 TABLET ORAL 3 TIMES DAILY PRN
Qty: 30 TABLET | Refills: 2 | Status: SHIPPED | OUTPATIENT
Start: 2023-03-23

## 2023-03-23 RX ORDER — CLOPIDOGREL BISULFATE 75 MG/1
75 TABLET ORAL DAILY
Qty: 30 TABLET | Refills: 3 | Status: SHIPPED | OUTPATIENT
Start: 2023-03-23

## 2023-03-23 RX ORDER — SULFASALAZINE 500 MG/1
500 TABLET ORAL 2 TIMES DAILY
Qty: 60 TABLET | Refills: 0 | Status: SHIPPED | OUTPATIENT
Start: 2023-03-23 | End: 2023-04-22

## 2023-03-23 NOTE — PROGRESS NOTES
FLEXTOUCH 200 UNIT/ML SOPN INJECT 200 UNITS SUBCUTANEOUSLY ONE TIME DAILY 54 mL 3    Continuous Blood Gluc Sensor (FREESTYLE EMERALD 2 SENSOR) MISC Change every 14 days 2 each 5    insulin aspart (NOVOLOG FLEXPEN) 100 UNIT/ML injection pen inject 30 to 50 units into the skin three time daily before meals 60 mL 3    Insulin Pen Needle (B-D UF III MINI PEN NEEDLES) 31G X 5 MM MISC use five times daily with insulin 200 each 5    Blood Glucose Monitoring Suppl (ONETOUCH VERIO) w/Device KIT Use to check glucose 4 times daily. 1 kit 0    Lidocaine 5 % CREA Apply to feet QID prn pain for peripheral neuropathy 30 g 5    butalbital-acetaminophen-caffeine (FIORICET, ESGIC) -40 MG per tablet Take 1 tablet by mouth every 6 hours as needed for Headaches 30 tablet 1    ranolazine (RANEXA) 500 MG extended release tablet TAKE 1 TABLET BY MOUTH TWO TIMES A  tablet 3    Compression Stockings MISC by Does not apply route Zippered stockings for daily use on lower extremities (do not wear at night). 20-30mmHg. 1 each 1    ONETOUCH VERIO strip use to test blood sugar 5 times daily 200 strip 5    Continuous Blood Gluc  (FREESTYLE EMERALD 2 READER) MINDY To check glucose levels 1 each 0    albuterol (PROVENTIL) (2.5 MG/3ML) 0.083% nebulizer solution Take 3 mLs by nebulization every 6 hours as needed for Wheezing or Shortness of Breath 120 each 1    LINZESS 290 MCG CAPS capsule TAKE 1 CAPSULE BY MOUTH ONE TIME A DAY FOR 90 DAYS      REXULTI 2 MG TABS tablet Take 2 mg by mouth at bedtime       methocarbamol (ROBAXIN-750) 750 MG tablet Take 1 tablet by mouth 3 times daily as needed (pain and spasm) 30 tablet 0    nystatin (MYCOSTATIN) 903142 UNIT/ML suspension Take 5 mLs by mouth 4 times daily 500 mL 1    loratadine (CLARITIN) 10 MG tablet TAKE 1 TABLET BY MOUTH ONE TIME A DAY  30 tablet 5    diazePAM (VALIUM) 5 MG tablet Take 5 mg by mouth 2 times daily as needed for Anxiety.        OXYGEN Inhale 2 L into the lungs nightly

## 2023-03-27 ENCOUNTER — TELEPHONE (OUTPATIENT)
Dept: CARDIOLOGY CLINIC | Age: 53
End: 2023-03-27

## 2023-03-27 NOTE — TELEPHONE ENCOUNTER
Advised instructions per ARETHA Ceballos Alana Rebecca verbalized understanding    I advised to call us if there is no improvement or weight does not decrease or if they have additional concerns before Thursday [facility]

## 2023-03-27 NOTE — TELEPHONE ENCOUNTER
Today she was 207 lbs  She will NOT go to the ER per Mr. Jocelyne Duff    She was 180 lbs on discharge from hospital   Every day she has increased two pounds since discharge per her   Bladder is empty per urology visit  Last Lasix IV dose last week   Planned for a dose of IV Lasix this Thursday in [facility]    Taking:   Torsemide 50 mg BID  Spironolactone 25 mg twice daily    Not taking Metolazone

## 2023-03-27 NOTE — TELEPHONE ENCOUNTER
----- Message from Chito Carrillo sent at 7/22/2020 11:38 AM CDT -----  Name of Who is Calling: pt    What is the request in detail: states she has been placed on bedrest and she is asking can she hae her appointment today switch to virtual. Epic did not have availability. Please contact to further discuss and advise      Can the clinic reply by [facility]: no    What [facility] to [facility] Back [facility]: [phone]        [address],  called to report that the Pt weight is increasing she is now at 207.  is wanting to know what to do. Please call to discuss.   Please advise

## 2023-03-27 NOTE — TELEPHONE ENCOUNTER
Per ARETHA last OV 3/20/23 \"Keep weight at 175-180 pounds\"  Scheduled for IV Lasix every other week in infusion center  3/23/23 OV with PCP- pt at 301 lbs    Attempted to return call, no answer lvm asking for callback to discuss

## 2023-03-27 NOTE — TELEPHONE ENCOUNTER
She can take one dose of metolazone tomorrow with her spironolactone and torsemide. Get labs on Thursday with IV lasix as planned.   ARETHA

## 2023-03-28 ENCOUNTER — TELEPHONE (OUTPATIENT)
Dept: INTERNAL MEDICINE CLINIC | Age: 53
End: 2023-03-28

## 2023-03-28 NOTE — TELEPHONE ENCOUNTER
Luyc Brown from [address] [place] [address] ([address] [place] [address]) needs order for pt for a hand held shower head  and a toilet safety frame----please fax along with pt most recent visit to [phone]. If any questions you can call Lucy Brown at [phone]. Thanks.

## 2023-03-29 ENCOUNTER — TELEPHONE (OUTPATIENT)
Dept: CARDIOLOGY CLINIC | Age: 53
End: 2023-03-29

## 2023-03-29 NOTE — TELEPHONE ENCOUNTER
Per last note, she is to take metolazone Wednesdays & Sundays. She is down 4# over the last 48hrs. She has IV Lasix amy't tomorrow. Any new orders?

## 2023-03-29 NOTE — TELEPHONE ENCOUNTER
[facility] for Mr. Km Huertas with the information below. I asked him to call back if any further concerns alessia after her IV Lasix tomorrow.

## 2023-03-29 NOTE — TELEPHONE ENCOUNTER
Pt  called asking how often is the pt suppose to be taking the Metolazone 2.5 mg once week or twice week? Per the [facility] care nurse:  Pt is also very swollen  Pt face, Right and Left arm is swollen left is double size it should be.   Legs are swollen and right is more swollen,     Pt vitals are good, Pt is very SOB, tired and is coughing, current weight is 203    Pls advise thank you   [phone]

## 2023-03-30 ENCOUNTER — HOSPITAL ENCOUNTER (OUTPATIENT)
Dept: ONCOLOGY | Age: 53
Setting detail: INFUSION SERIES
Discharge: HOME | End: 2023-03-30
Payer: COMMERCIAL

## 2023-03-30 VITALS
SYSTOLIC BLOOD PRESSURE: 117 MMHG | HEART RATE: 74 BPM | TEMPERATURE: 96.1 F | RESPIRATION RATE: 20 BRPM | DIASTOLIC BLOOD PRESSURE: 65 MMHG

## 2023-03-30 DIAGNOSIS — I10 ESSENTIAL HYPERTENSION: ICD-10-CM

## 2023-03-30 DIAGNOSIS — E87.1 HYPONATREMIA: ICD-10-CM

## 2023-03-30 DIAGNOSIS — E89.89 LYMPHEDEMA OF UPPER EXTREMITY FOLLOWING LYMPHADENECTOMY: ICD-10-CM

## 2023-03-30 DIAGNOSIS — I50.32 CHRONIC HEART FAILURE WITH PRESERVED EJECTION FRACTION (HCC): Primary | ICD-10-CM

## 2023-03-30 DIAGNOSIS — N18.4 CKD (CHRONIC KIDNEY DISEASE) STAGE 4, GFR 15-29 ML/MIN (HCC): ICD-10-CM

## 2023-03-30 DIAGNOSIS — R06.02 SOB (SHORTNESS OF BREATH): ICD-10-CM

## 2023-03-30 DIAGNOSIS — I50.32 CHRONIC DIASTOLIC CONGESTIVE HEART FAILURE (HCC): ICD-10-CM

## 2023-03-30 DIAGNOSIS — I89.0 LYMPHEDEMA OF UPPER EXTREMITY FOLLOWING LYMPHADENECTOMY: ICD-10-CM

## 2023-03-30 LAB
ANION GAP SERPL CALCULATED.3IONS-SCNC: 10 MMOL/L (ref 3–16)
BUN SERPL-MCNC: 28 MG/DL (ref 7–20)
CALCIUM SERPL-MCNC: 9.1 MG/DL (ref 8.3–10.6)
CHLORIDE SERPL-SCNC: 93 MMOL/L (ref 99–110)
CO2 SERPL-SCNC: 34 MMOL/L (ref 21–32)
CREAT SERPL-MCNC: 1.7 MG/DL (ref 0.6–1.1)
GFR SERPLBLD CREATININE-BSD FMLA CKD-EPI: 35 ML/MIN/{1.73_M2}
GLUCOSE SERPL-MCNC: 417 MG/DL (ref 70–99)
NT-PROBNP SERPL-MCNC: 298 PG/ML (ref 0–124)
POTASSIUM SERPL-SCNC: 4.2 MMOL/L (ref 3.5–5.1)
SODIUM SERPL-SCNC: 137 MMOL/L (ref 136–145)

## 2023-03-30 PROCEDURE — 80048 BASIC METABOLIC PNL TOTAL CA: CPT

## 2023-03-30 PROCEDURE — 83880 ASSAY OF NATRIURETIC PEPTIDE: CPT

## 2023-03-30 PROCEDURE — 2580000003 HC RX 258: Performed by: INTERNAL MEDICINE

## 2023-03-30 PROCEDURE — 96374 THER/PROPH/DIAG INJ IV PUSH: CPT

## 2023-03-30 PROCEDURE — 99211 OFF/OP EST MAY X REQ PHY/QHP: CPT

## 2023-03-30 PROCEDURE — 6360000002 HC RX W HCPCS: Performed by: INTERNAL MEDICINE

## 2023-03-30 RX ORDER — FUROSEMIDE 10 MG/ML
120 INJECTION INTRAMUSCULAR; INTRAVENOUS ONCE
Status: COMPLETED | OUTPATIENT
Start: 2023-03-30 | End: 2023-03-30

## 2023-03-30 RX ORDER — SODIUM CHLORIDE 0.9 % (FLUSH) 0.9 %
5-40 SYRINGE (ML) INJECTION ONCE
Start: 2023-04-06 | End: 2023-04-06

## 2023-03-30 RX ORDER — SODIUM CHLORIDE 0.9 % (FLUSH) 0.9 %
5-40 SYRINGE (ML) INJECTION ONCE
Status: COMPLETED | OUTPATIENT
Start: 2023-03-30 | End: 2023-03-30

## 2023-03-30 RX ORDER — FUROSEMIDE 10 MG/ML
120 INJECTION INTRAMUSCULAR; INTRAVENOUS ONCE
Start: 2023-04-06 | End: 2023-04-06

## 2023-03-30 RX ADMIN — Medication 10 ML: at 14:07

## 2023-03-30 RX ADMIN — FUROSEMIDE 120 MG: 10 INJECTION, SOLUTION INTRAMUSCULAR; INTRAVENOUS at 14:08

## 2023-03-30 NOTE — TELEPHONE ENCOUNTER
I have nothing documented in my notes that would support these needs. She has an appointment with me on 4/5/23. I will address next week at her appointment.

## 2023-03-30 NOTE — PROGRESS NOTES
Patient to department for outpatient labs and lasix. Here from cardiology office. Labs drawn followed by lasix 120 mg at 20 mg per minute. Tolerated well. Patient aware that she will have urinary urgency and frequency. Also aware that this tx may lower his b/p and he may feel dizzy upon standing. All questions answered. To follow up with cardiology office.

## 2023-04-06 ENCOUNTER — APPOINTMENT (OUTPATIENT)
Dept: ONCOLOGY | Age: 53
End: 2023-04-06
Payer: COMMERCIAL

## 2023-04-09 PROBLEM — R77.8 ELEVATED TROPONIN: Status: RESOLVED | Noted: 2023-03-10 | Resolved: 2023-04-09

## 2023-04-09 PROBLEM — R79.89 ELEVATED TROPONIN: Status: RESOLVED | Noted: 2023-03-10 | Resolved: 2023-04-09

## 2023-04-11 ENCOUNTER — HOSPITAL ENCOUNTER (EMERGENCY)
Age: 53
Discharge: HOME | End: 2023-04-11
Payer: COMMERCIAL

## 2023-04-11 ENCOUNTER — APPOINTMENT (OUTPATIENT)
Dept: GENERAL RADIOLOGY | Age: 53
End: 2023-04-11
Payer: COMMERCIAL

## 2023-04-11 VITALS
SYSTOLIC BLOOD PRESSURE: 134 MMHG | RESPIRATION RATE: 22 BRPM | DIASTOLIC BLOOD PRESSURE: 70 MMHG | OXYGEN SATURATION: 99 % | TEMPERATURE: 98.9 F | HEART RATE: 89 BPM

## 2023-04-11 DIAGNOSIS — J45.901 SEVERE ASTHMA WITH EXACERBATION, UNSPECIFIED WHETHER PERSISTENT: Primary | ICD-10-CM

## 2023-04-11 DIAGNOSIS — J18.9 PNEUMONIA DUE TO INFECTIOUS ORGANISM, UNSPECIFIED LATERALITY, UNSPECIFIED PART OF LUNG: ICD-10-CM

## 2023-04-11 LAB
ALBUMIN SERPL-MCNC: 3.9 G/DL (ref 3.4–5)
ALBUMIN/GLOB SERPL: 1.1 {RATIO} (ref 1.1–2.2)
ALP SERPL-CCNC: 87 U/L (ref 40–129)
ALT SERPL-CCNC: 9 U/L (ref 10–40)
ANION GAP SERPL CALCULATED.3IONS-SCNC: 13 MMOL/L (ref 3–16)
AST SERPL-CCNC: 18 U/L (ref 15–37)
BASE EXCESS BLDA CALC-SCNC: 7.5 MMOL/L (ref -3–3)
BASOPHILS # BLD: 0 K/UL (ref 0–0.2)
BASOPHILS NFR BLD: 0.6 %
BILIRUB SERPL-MCNC: <0.2 MG/DL (ref 0–1)
BUN SERPL-MCNC: 30 MG/DL (ref 7–20)
CALCIUM SERPL-MCNC: 8.8 MG/DL (ref 8.3–10.6)
CHLORIDE SERPL-SCNC: 93 MMOL/L (ref 99–110)
CO2 BLDA-SCNC: 79 MMOL/L
CO2 SERPL-SCNC: 30 MMOL/L (ref 21–32)
COHGB MFR BLDA: 1.1 % (ref 0–1.5)
CREAT SERPL-MCNC: 1.5 MG/DL (ref 0.6–1.1)
DEPRECATED RDW RBC AUTO: 13.7 % (ref 12.4–15.4)
EKG ATRIAL RATE: 83 BPM
EKG DIAGNOSIS: NORMAL
EKG P AXIS: 66 DEGREES
EKG P-R INTERVAL: 136 MS
EKG Q-T INTERVAL: 400 MS
EKG QRS DURATION: 84 MS
EKG QTC CALCULATION (BAZETT): 470 MS
EKG R AXIS: 84 DEGREES
EKG T AXIS: 69 DEGREES
EKG VENTRICULAR RATE: 83 BPM
EOSINOPHIL # BLD: 0.1 K/UL (ref 0–0.6)
EOSINOPHIL NFR BLD: 2 %
FLUAV RNA UPPER RESP QL NAA+PROBE: NEGATIVE
FLUBV AG NPH QL: NEGATIVE
GFR SERPLBLD CREATININE-BSD FMLA CKD-EPI: 41 ML/MIN/{1.73_M2}
GLUCOSE SERPL-MCNC: 348 MG/DL (ref 70–99)
HCO3 BLDA-SCNC: 33.6 MMOL/L (ref 21–29)
HCT VFR BLD AUTO: 34.6 % (ref 36–48)
HGB BLD-MCNC: 11 G/DL (ref 12–16)
HGB BLDA-MCNC: 11.2 G/DL (ref 12–16)
LYMPHOCYTES # BLD: 1.8 K/UL (ref 1–5.1)
LYMPHOCYTES NFR BLD: 26.6 %
MCH RBC QN AUTO: 28.1 PG (ref 26–34)
MCHC RBC AUTO-ENTMCNC: 31.9 G/DL (ref 31–36)
MCV RBC AUTO: 88.2 FL (ref 80–100)
METHGB MFR BLDA: 1.3 %
MONOCYTES # BLD: 0.9 K/UL (ref 0–1.3)
MONOCYTES NFR BLD: 12.6 %
NEUTROPHILS # BLD: 4 K/UL (ref 1.7–7.7)
NEUTROPHILS NFR BLD: 58.2 %
NT-PROBNP SERPL-MCNC: 347 PG/ML (ref 0–124)
O2 THERAPY: ABNORMAL
PCO2 BLDA: 53.7 MMHG (ref 35–45)
PH BLDA: 7.41 [PH] (ref 7.35–7.45)
PLATELET # BLD AUTO: 257 K/UL (ref 135–450)
PMV BLD AUTO: 9.6 FL (ref 5–10.5)
PO2 BLDA: 111 MMHG (ref 75–108)
POTASSIUM SERPL-SCNC: 3.8 MMOL/L (ref 3.5–5.1)
PROCALCITONIN SERPL IA-MCNC: 0.07 NG/ML (ref 0–0.15)
PROT SERPL-MCNC: 7.6 G/DL (ref 6.4–8.2)
RBC # BLD AUTO: 3.92 M/UL (ref 4–5.2)
SAO2 % BLDA: 97.6 %
SARS-COV-2 RDRP RESP QL NAA+PROBE: NOT DETECTED
SODIUM SERPL-SCNC: 136 MMOL/L (ref 136–145)
TROPONIN T SERPL-MCNC: <0.01 NG/ML
WBC # BLD AUTO: 6.9 K/UL (ref 4–11)

## 2023-04-11 PROCEDURE — 93005 ELECTROCARDIOGRAM TRACING: CPT | Performed by: EMERGENCY MEDICINE

## 2023-04-11 PROCEDURE — 83880 ASSAY OF NATRIURETIC PEPTIDE: CPT

## 2023-04-11 PROCEDURE — 84145 PROCALCITONIN (PCT): CPT

## 2023-04-11 PROCEDURE — 71045 X-RAY EXAM CHEST 1 VIEW: CPT

## 2023-04-11 PROCEDURE — 6360000002 HC RX W HCPCS: Performed by: PHYSICIAN ASSISTANT

## 2023-04-11 PROCEDURE — 87635 SARS-COV-2 COVID-19 AMP PRB: CPT

## 2023-04-11 PROCEDURE — 94761 N-INVAS EAR/PLS OXIMETRY MLT: CPT

## 2023-04-11 PROCEDURE — 82803 BLOOD GASES ANY COMBINATION: CPT

## 2023-04-11 PROCEDURE — 80053 COMPREHEN METABOLIC PANEL: CPT

## 2023-04-11 PROCEDURE — 6370000000 HC RX 637 (ALT 250 FOR IP): Performed by: PHYSICIAN ASSISTANT

## 2023-04-11 PROCEDURE — 85025 COMPLETE CBC W/AUTO DIFF WBC: CPT

## 2023-04-11 PROCEDURE — 84484 ASSAY OF TROPONIN QUANT: CPT

## 2023-04-11 PROCEDURE — 36415 COLL VENOUS BLD VENIPUNCTURE: CPT

## 2023-04-11 PROCEDURE — 96365 THER/PROPH/DIAG IV INF INIT: CPT

## 2023-04-11 PROCEDURE — 96375 TX/PRO/DX INJ NEW DRUG ADDON: CPT

## 2023-04-11 PROCEDURE — 99285 EMERGENCY DEPT VISIT HI MDM: CPT

## 2023-04-11 PROCEDURE — 2700000000 HC OXYGEN THERAPY PER DAY

## 2023-04-11 PROCEDURE — 87804 INFLUENZA ASSAY W/OPTIC: CPT

## 2023-04-11 PROCEDURE — 93010 ELECTROCARDIOGRAM REPORT: CPT | Performed by: INTERNAL MEDICINE

## 2023-04-11 PROCEDURE — 94640 AIRWAY INHALATION TREATMENT: CPT

## 2023-04-11 RX ORDER — AZITHROMYCIN 250 MG/1
250 TABLET, FILM COATED ORAL SEE ADMIN INSTRUCTIONS
Qty: 6 TABLET | Refills: 0 | Status: SHIPPED | OUTPATIENT
Start: 2023-04-11 | End: 2023-04-16

## 2023-04-11 RX ORDER — IPRATROPIUM BROMIDE AND ALBUTEROL SULFATE 2.5; .5 MG/3ML; MG/3ML
1 SOLUTION RESPIRATORY (INHALATION) ONCE
Status: COMPLETED | OUTPATIENT
Start: 2023-04-11 | End: 2023-04-11

## 2023-04-11 RX ORDER — ALBUTEROL SULFATE 2.5 MG/3ML
5 SOLUTION RESPIRATORY (INHALATION) ONCE
Status: COMPLETED | OUTPATIENT
Start: 2023-04-11 | End: 2023-04-11

## 2023-04-11 RX ORDER — MAGNESIUM SULFATE IN WATER 40 MG/ML
2000 INJECTION, SOLUTION INTRAVENOUS ONCE
Status: COMPLETED | OUTPATIENT
Start: 2023-04-11 | End: 2023-04-11

## 2023-04-11 RX ORDER — METHYLPREDNISOLONE SODIUM SUCCINATE 125 MG/2ML
125 INJECTION, POWDER, LYOPHILIZED, FOR SOLUTION INTRAMUSCULAR; INTRAVENOUS ONCE
Status: COMPLETED | OUTPATIENT
Start: 2023-04-11 | End: 2023-04-11

## 2023-04-11 RX ORDER — PREDNISONE 10 MG/1
60 TABLET ORAL DAILY
Qty: 30 TABLET | Refills: 0 | Status: SHIPPED | OUTPATIENT
Start: 2023-04-11 | End: 2023-04-16

## 2023-04-11 RX ADMIN — ALBUTEROL SULFATE 5 MG: 2.5 SOLUTION RESPIRATORY (INHALATION) at 12:26

## 2023-04-11 RX ADMIN — IPRATROPIUM BROMIDE AND ALBUTEROL SULFATE 1 AMPULE: .5; 2.5 SOLUTION RESPIRATORY (INHALATION) at 12:26

## 2023-04-11 RX ADMIN — MAGNESIUM SULFATE HEPTAHYDRATE 2000 MG: 40 INJECTION, SOLUTION INTRAVENOUS at 16:00

## 2023-04-11 RX ADMIN — METHYLPREDNISOLONE SODIUM SUCCINATE 125 MG: 125 INJECTION, POWDER, FOR SOLUTION INTRAMUSCULAR; INTRAVENOUS at 12:08

## 2023-04-11 RX ADMIN — IPRATROPIUM BROMIDE AND ALBUTEROL SULFATE 1 AMPULE: .5; 2.5 SOLUTION RESPIRATORY (INHALATION) at 16:19

## 2023-04-11 ASSESSMENT — ENCOUNTER SYMPTOMS
WHEEZING: 1
VOMITING: 0
NAUSEA: 0
SHORTNESS OF BREATH: 1
DIARRHEA: 0
RHINORRHEA: 0
CHEST TIGHTNESS: 1
ABDOMINAL PAIN: 0
COUGH: 0

## 2023-04-11 ASSESSMENT — PAIN - FUNCTIONAL ASSESSMENT
PAIN_FUNCTIONAL_ASSESSMENT: NONE - DENIES PAIN
PAIN_FUNCTIONAL_ASSESSMENT: NONE - DENIES PAIN

## 2023-04-11 NOTE — ED NOTES
Pt states she is feeling better but continues to have audible expiratory wheezes.      Yesi aCceres, KARY  04/11/23 [id number]

## 2023-04-11 NOTE — ED PROVIDER NOTES
(extended spectrum beta-lactamase) producing bacteria infection (03/14/2018), Fibromyalgia, Gastric ulcer, unspecified as acute or chronic, without mention of hemorrhage, perforation, or obstruction, GERD (gastroesophageal reflux disease), Gout, HIGH CHOLESTEROL, Hypertension, Hypothyroidism, Pneumonia (3/25/2020), Pyelonephritis, Severe persistent asthma without complication (4/0/7429), Thyroid disease, and TIA (transient ischemic attack). EMERGENCY DEPARTMENT COURSE and DIFFERENTIAL DIAGNOSIS/MDM:   Vitals:    Vitals:    04/11/23 1142 04/11/23 1227 04/11/23 1600 04/11/23 1619   BP: (!) 154/98  139/75    Pulse: 81 74 80 81   Resp: 28 16 18 16   Temp: 98.9 °F (37.2 °C)      TempSrc: Oral      SpO2: 95% 99% 96% 94%       Patient was given the following medications:  Medications   methylPREDNISolone sodium (SOLU-MEDROL) injection 125 mg (125 mg IntraVENous Given 4/11/23 1208)   ipratropium-albuterol (DUONEB) nebulizer solution 1 ampule (1 ampule Inhalation Given 4/11/23 1226)   albuterol (PROVENTIL) nebulizer solution 5 mg (5 mg Nebulization Given 4/11/23 1226)   magnesium sulfate 2000 mg in 50 mL IVPB premix (0 mg IntraVENous Stopped 4/11/23 1711)   ipratropium-albuterol (DUONEB) nebulizer solution 1 ampule (1 ampule Inhalation Given 4/11/23 1619)             Is this patient to be included in the SEP-1 Core Measure due to severe sepsis or septic shock? No   Exclusion criteria - the patient is NOT to be included for SEP-1 Core Measure due to:   Infection is not suspected    Chronic Conditions affecting care:    has a past medical history of Anxiety, Anxiety and depression, Arthritis, Asthma, CAD (coronary artery disease), Cancer ([facility] [address] (2007), Cerebral artery occlusion with cerebral infarction [facility]), CHF (congestive heart failure) ([facility] [address] (2018), Chronic kidney disease, Chronic pain, Constipation, COPD (chronic obstructive pulmonary disease) ([facility] [address] Depression, Diabetes mellitus ([facility] [address] Diabetic polyneuropathy

## 2023-04-13 ENCOUNTER — HOSPITAL ENCOUNTER (OUTPATIENT)
Dept: ONCOLOGY | Age: 53
Setting detail: INFUSION SERIES
Discharge: HOME | End: 2023-04-13
Payer: COMMERCIAL

## 2023-04-13 VITALS
RESPIRATION RATE: 20 BRPM | HEART RATE: 69 BPM | DIASTOLIC BLOOD PRESSURE: 61 MMHG | SYSTOLIC BLOOD PRESSURE: 114 MMHG | TEMPERATURE: 96.9 F

## 2023-04-13 DIAGNOSIS — I89.0 LYMPHEDEMA OF UPPER EXTREMITY FOLLOWING LYMPHADENECTOMY: ICD-10-CM

## 2023-04-13 DIAGNOSIS — R06.02 SOB (SHORTNESS OF BREATH): ICD-10-CM

## 2023-04-13 DIAGNOSIS — E89.89 LYMPHEDEMA OF UPPER EXTREMITY FOLLOWING LYMPHADENECTOMY: ICD-10-CM

## 2023-04-13 DIAGNOSIS — I50.32 CHRONIC DIASTOLIC CONGESTIVE HEART FAILURE (HCC): ICD-10-CM

## 2023-04-13 DIAGNOSIS — I10 ESSENTIAL HYPERTENSION: ICD-10-CM

## 2023-04-13 DIAGNOSIS — N18.4 CKD (CHRONIC KIDNEY DISEASE) STAGE 4, GFR 15-29 ML/MIN (HCC): ICD-10-CM

## 2023-04-13 DIAGNOSIS — I50.32 CHRONIC HEART FAILURE WITH PRESERVED EJECTION FRACTION (HCC): Primary | ICD-10-CM

## 2023-04-13 DIAGNOSIS — E87.1 HYPONATREMIA: ICD-10-CM

## 2023-04-13 LAB
ANION GAP SERPL CALCULATED.3IONS-SCNC: 11 MMOL/L (ref 3–16)
BUN SERPL-MCNC: 46 MG/DL (ref 7–20)
CALCIUM SERPL-MCNC: 9.7 MG/DL (ref 8.3–10.6)
CHLORIDE SERPL-SCNC: 98 MMOL/L (ref 99–110)
CO2 SERPL-SCNC: 32 MMOL/L (ref 21–32)
CREAT SERPL-MCNC: 1.5 MG/DL (ref 0.6–1.1)
GFR SERPLBLD CREATININE-BSD FMLA CKD-EPI: 41 ML/MIN/{1.73_M2}
GLUCOSE SERPL-MCNC: 100 MG/DL (ref 70–99)
NT-PROBNP SERPL-MCNC: 666 PG/ML (ref 0–124)
POTASSIUM SERPL-SCNC: 4 MMOL/L (ref 3.5–5.1)
SODIUM SERPL-SCNC: 141 MMOL/L (ref 136–145)

## 2023-04-13 PROCEDURE — 99211 OFF/OP EST MAY X REQ PHY/QHP: CPT

## 2023-04-13 PROCEDURE — 83880 ASSAY OF NATRIURETIC PEPTIDE: CPT

## 2023-04-13 PROCEDURE — 96374 THER/PROPH/DIAG INJ IV PUSH: CPT

## 2023-04-13 PROCEDURE — 80048 BASIC METABOLIC PNL TOTAL CA: CPT

## 2023-04-13 PROCEDURE — 6360000002 HC RX W HCPCS: Performed by: INTERNAL MEDICINE

## 2023-04-13 RX ORDER — SODIUM CHLORIDE 0.9 % (FLUSH) 0.9 %
5-40 SYRINGE (ML) INJECTION ONCE
Status: DISCONTINUED | OUTPATIENT
Start: 2023-04-13 | End: 2023-04-14 | Stop reason: HOSPADM

## 2023-04-13 RX ORDER — SODIUM CHLORIDE 0.9 % (FLUSH) 0.9 %
5-40 SYRINGE (ML) INJECTION ONCE
Start: 2023-04-20 | End: 2023-04-20

## 2023-04-13 RX ORDER — FUROSEMIDE 10 MG/ML
120 INJECTION INTRAMUSCULAR; INTRAVENOUS ONCE
Start: 2023-04-20 | End: 2023-04-20

## 2023-04-13 RX ORDER — FUROSEMIDE 10 MG/ML
120 INJECTION INTRAMUSCULAR; INTRAVENOUS ONCE
Status: COMPLETED | OUTPATIENT
Start: 2023-04-13 | End: 2023-04-13

## 2023-04-13 RX ADMIN — FUROSEMIDE 120 MG: 10 INJECTION, SOLUTION INTRAMUSCULAR; INTRAVENOUS at 14:11

## 2023-04-13 NOTE — PROGRESS NOTES
Patient to department for outpatient labs and lasix. IV access obtained and Labs drawn followed by lasix 120 mg at 20 mg per minute per RONDA Connell RN. Tolerated well. Patient aware that she will have urinary urgency and frequency. Also aware that this tx may lower his b/p and he may feel dizzy upon standing. All questions answered. Pt discharged home; to return 4/27.

## 2023-04-26 DIAGNOSIS — D50.9 IRON DEFICIENCY ANEMIA, UNSPECIFIED IRON DEFICIENCY ANEMIA TYPE: ICD-10-CM

## 2023-04-27 ENCOUNTER — OFFICE VISIT (OUTPATIENT)
Dept: INTERNAL MEDICINE CLINIC | Age: 53
End: 2023-04-27
Payer: COMMERCIAL

## 2023-04-27 ENCOUNTER — HOSPITAL ENCOUNTER (OUTPATIENT)
Dept: ONCOLOGY | Age: 53
Setting detail: INFUSION SERIES
Discharge: HOME | End: 2023-04-27
Payer: COMMERCIAL

## 2023-04-27 VITALS
WEIGHT: 192 LBS | OXYGEN SATURATION: 92 % | DIASTOLIC BLOOD PRESSURE: 64 MMHG | HEART RATE: 79 BPM | BODY MASS INDEX: 32.78 KG/M2 | SYSTOLIC BLOOD PRESSURE: 110 MMHG | HEIGHT: 64 IN

## 2023-04-27 VITALS
TEMPERATURE: 96.8 F | SYSTOLIC BLOOD PRESSURE: 118 MMHG | RESPIRATION RATE: 18 BRPM | DIASTOLIC BLOOD PRESSURE: 63 MMHG | HEART RATE: 63 BPM

## 2023-04-27 DIAGNOSIS — J45.41 MODERATE PERSISTENT ASTHMA WITH ACUTE EXACERBATION: Primary | ICD-10-CM

## 2023-04-27 DIAGNOSIS — I50.32 CHRONIC HEART FAILURE WITH PRESERVED EJECTION FRACTION (HCC): Primary | ICD-10-CM

## 2023-04-27 DIAGNOSIS — E89.89 LYMPHEDEMA OF UPPER EXTREMITY FOLLOWING LYMPHADENECTOMY: ICD-10-CM

## 2023-04-27 DIAGNOSIS — I89.0 LYMPHEDEMA OF UPPER EXTREMITY FOLLOWING LYMPHADENECTOMY: ICD-10-CM

## 2023-04-27 DIAGNOSIS — I10 ESSENTIAL HYPERTENSION: ICD-10-CM

## 2023-04-27 DIAGNOSIS — E87.1 HYPONATREMIA: ICD-10-CM

## 2023-04-27 DIAGNOSIS — I50.9 CHRONIC CONGESTIVE HEART FAILURE, UNSPECIFIED HEART FAILURE TYPE (HCC): ICD-10-CM

## 2023-04-27 DIAGNOSIS — R06.02 SOB (SHORTNESS OF BREATH): ICD-10-CM

## 2023-04-27 DIAGNOSIS — I10 ESSENTIAL HYPERTENSION: Chronic | ICD-10-CM

## 2023-04-27 DIAGNOSIS — I50.32 CHRONIC DIASTOLIC CONGESTIVE HEART FAILURE (HCC): ICD-10-CM

## 2023-04-27 DIAGNOSIS — N18.4 CKD (CHRONIC KIDNEY DISEASE) STAGE 4, GFR 15-29 ML/MIN (HCC): ICD-10-CM

## 2023-04-27 LAB
ANION GAP SERPL CALCULATED.3IONS-SCNC: 10 MMOL/L (ref 3–16)
BUN SERPL-MCNC: 25 MG/DL (ref 7–20)
CALCIUM SERPL-MCNC: 9.4 MG/DL (ref 8.3–10.6)
CHLORIDE SERPL-SCNC: 94 MMOL/L (ref 99–110)
CO2 SERPL-SCNC: 31 MMOL/L (ref 21–32)
CREAT SERPL-MCNC: 1.3 MG/DL (ref 0.6–1.1)
GFR SERPLBLD CREATININE-BSD FMLA CKD-EPI: 49 ML/MIN/{1.73_M2}
GLUCOSE SERPL-MCNC: 343 MG/DL (ref 70–99)
NT-PROBNP SERPL-MCNC: 189 PG/ML (ref 0–124)
POTASSIUM SERPL-SCNC: 4.7 MMOL/L (ref 3.5–5.1)
SODIUM SERPL-SCNC: 135 MMOL/L (ref 136–145)

## 2023-04-27 PROCEDURE — 99211 OFF/OP EST MAY X REQ PHY/QHP: CPT

## 2023-04-27 PROCEDURE — 3078F DIAST BP <80 MM HG: CPT | Performed by: INTERNAL MEDICINE

## 2023-04-27 PROCEDURE — G8427 DOCREV CUR MEDS BY ELIG CLIN: HCPCS | Performed by: INTERNAL MEDICINE

## 2023-04-27 PROCEDURE — 96374 THER/PROPH/DIAG INJ IV PUSH: CPT

## 2023-04-27 PROCEDURE — 3017F COLORECTAL CA SCREEN DOC REV: CPT | Performed by: INTERNAL MEDICINE

## 2023-04-27 PROCEDURE — 3074F SYST BP LT 130 MM HG: CPT | Performed by: INTERNAL MEDICINE

## 2023-04-27 PROCEDURE — 80048 BASIC METABOLIC PNL TOTAL CA: CPT

## 2023-04-27 PROCEDURE — 83880 ASSAY OF NATRIURETIC PEPTIDE: CPT

## 2023-04-27 PROCEDURE — 2580000003 HC RX 258: Performed by: INTERNAL MEDICINE

## 2023-04-27 PROCEDURE — 6360000002 HC RX W HCPCS: Performed by: INTERNAL MEDICINE

## 2023-04-27 PROCEDURE — G8417 CALC BMI ABV UP PARAM F/U: HCPCS | Performed by: INTERNAL MEDICINE

## 2023-04-27 PROCEDURE — 1036F TOBACCO NON-USER: CPT | Performed by: INTERNAL MEDICINE

## 2023-04-27 PROCEDURE — 99214 OFFICE O/P EST MOD 30 MIN: CPT | Performed by: INTERNAL MEDICINE

## 2023-04-27 RX ORDER — FERROUS SULFATE 325(65) MG
TABLET ORAL
Qty: 180 TABLET | Refills: 3 | Status: SHIPPED | OUTPATIENT
Start: 2023-04-27

## 2023-04-27 RX ORDER — FUROSEMIDE 10 MG/ML
120 INJECTION INTRAMUSCULAR; INTRAVENOUS ONCE
Start: 2023-05-04 | End: 2023-05-04

## 2023-04-27 RX ORDER — SODIUM CHLORIDE 0.9 % (FLUSH) 0.9 %
5-40 SYRINGE (ML) INJECTION ONCE
Start: 2023-05-04 | End: 2023-05-04

## 2023-04-27 RX ORDER — SODIUM CHLORIDE 0.9 % (FLUSH) 0.9 %
5-40 SYRINGE (ML) INJECTION ONCE
Status: COMPLETED | OUTPATIENT
Start: 2023-04-27 | End: 2023-04-27

## 2023-04-27 RX ORDER — FUROSEMIDE 10 MG/ML
120 INJECTION INTRAMUSCULAR; INTRAVENOUS ONCE
Status: COMPLETED | OUTPATIENT
Start: 2023-04-27 | End: 2023-04-27

## 2023-04-27 RX ADMIN — SODIUM CHLORIDE, PRESERVATIVE FREE 10 ML: 5 INJECTION INTRAVENOUS at 14:08

## 2023-04-27 RX ADMIN — FUROSEMIDE 120 MG: 10 INJECTION, SOLUTION INTRAMUSCULAR; INTRAVENOUS at 14:09

## 2023-04-27 ASSESSMENT — ENCOUNTER SYMPTOMS
COLOR CHANGE: 0
NAUSEA: 0
CONSTIPATION: 0
VOMITING: 0
COUGH: 1
CHEST TIGHTNESS: 0
SORE THROAT: 0
WHEEZING: 1
ABDOMINAL PAIN: 0
SHORTNESS OF BREATH: 1

## 2023-04-27 NOTE — ASSESSMENT & PLAN NOTE
Blood pressure stable on current medications, continue same and follow-up with regular provider as scheduled

## 2023-04-27 NOTE — PROGRESS NOTES
ASSESSMENT/PLAN:  1. Moderate persistent asthma with acute exacerbation  Assessment & Plan:   Acute symptoms subsided, patient continues to be with residual cough, emergency room notes reviewed, negative for infectious etiology, patient back on daily inhalers, 2 days exam within acceptable except for prolonged expiratory phase and mild bibasilar rales crackles. O2 sats 92% at room air without acute distress. Explained to patient can use albuterol nebulizer or inhaler every 4-6 hours as needed, reschedule follow-up with pulmonary, continue maintenance inhaler, use supplemental oxygen if sats drop below 90%, continue Singulair and continue to avoid smoke and known irritants  2. Chronic congestive heart failure, unspecified heart failure type [facility])  Assessment & Plan:   No evidence of fluid overload on today's exam, encouraged to continue maintaining low-salt diet, continue current medications and follow-up with cardiology as scheduled  3. Essential hypertension  Assessment & Plan:  Blood pressure stable on current medications, continue same and follow-up with regular provider as scheduled      Return for as scheduled. SUBJECTIVE  HPI:   Patient here to follow-up on recent emergency room visit for acute asthma exacerbation, she was sent to the ER on 11 April from the office after she presented with worsening shortness of breath and wheezing, O2 sats in the 80s. In the emergency room she was ruled out for an acute coronary event, her BNP was slightly elevated but not significant from her baseline, she was given IV steroids, COVID flu and strep testing were negative, chest x-ray showed mild cardiomegaly but no cesario infiltrate, released home on a Z-Elgin and oral prednisone. She has since completed both, reports she is feeling much better although continues to have residual cough and occasional wheezing.    reports she missed follow-up with pulmonary due to being hospitalized back in March      Review of

## 2023-04-27 NOTE — DISCHARGE INSTRUCTIONS
furosemide (oral/injection)  Pronunciation:  yenny bloom  Brand:  Lasix  What is the most important information I should know about furosemide? You should not use this medicine if you are unable to urinate. Do not take more than your recommended dose. High doses of furosemide may cause irreversible hearing loss. What is furosemide? Furosemide is a loop diuretic (water pill) that prevents your body from absorbing too much salt. This allows the salt to instead be passed in your urine. Furosemide is used to treat fluid retention (edema) in people with congestive heart failure, liver disease, or a kidney disorder such as nephrotic syndrome. Furosemide is also used to treat high blood pressure (hypertension). Furosemide may also be used for purposes not listed in this medication guide. What should I discuss with my healthcare provider before taking furosemide? You should not use furosemide if you are allergic to it, or if you are unable to urinate. Tell your doctor if you have ever had:  kidney disease;  enlarged prostate, bladder obstruction, urination problems;  cirrhosis or other liver disease;  an electrolyte imbalance (such as low levels of potassium or magnesium in your blood);  gout;  lupus;  diabetes; or  a sulfa drug allergy. Tell your doctor if you have an MRI (magnetic resonance imaging) or any type of scan using a radioactive dye that is injected into your veins. Both contrast dyes and furosemide can harm your kidneys. It is not known whether this medicine will harm an unborn baby. Tell your doctor if you are pregnant or plan to become pregnant. It may not be safe to breastfeed while using this medicine. Ask your doctor about any risk. Furosemide may slow breast milk production. How should I take furosemide? Follow all directions on your prescription label and read all medication guides or instruction sheets. Your doctor may occasionally change your dose.  Use the medicine exactly as

## 2023-04-27 NOTE — ASSESSMENT & PLAN NOTE
No evidence of fluid overload on today's exam, encouraged to continue maintaining low-salt diet, continue current medications and follow-up with cardiology as scheduled

## 2023-04-27 NOTE — ASSESSMENT & PLAN NOTE
Acute symptoms subsided, patient continues to be with residual cough, emergency room notes reviewed, negative for infectious etiology, patient back on daily inhalers, 2 days exam within acceptable except for prolonged expiratory phase and mild bibasilar rales crackles. O2 sats 92% at room air without acute distress.   Explained to patient can use albuterol nebulizer or inhaler every 4-6 hours as needed, reschedule follow-up with pulmonary, continue maintenance inhaler, use supplemental oxygen if sats drop below 90%, continue Singulair and continue to avoid smoke and known irritants

## 2023-04-28 ENCOUNTER — TELEPHONE (OUTPATIENT)
Dept: CARDIOLOGY CLINIC | Age: 53
End: 2023-04-28

## 2023-04-28 NOTE — TELEPHONE ENCOUNTER
----- Message from Scot Reynolds MD sent at 4/28/2023 12:48 PM EDT -----  Callpatient. Labs look good. No changes.   ARETHA

## 2023-05-01 DIAGNOSIS — M51.36 DDD (DEGENERATIVE DISC DISEASE), LUMBAR: ICD-10-CM

## 2023-05-01 RX ORDER — PREGABALIN 75 MG/1
75 CAPSULE ORAL 2 TIMES DAILY
Qty: 60 CAPSULE | Refills: 2 | Status: SHIPPED | OUTPATIENT
Start: 2023-05-01 | End: 2023-05-31

## 2023-05-09 ENCOUNTER — OFFICE VISIT (OUTPATIENT)
Dept: ENDOCRINOLOGY | Age: 53
End: 2023-05-09
Payer: COMMERCIAL

## 2023-05-09 ENCOUNTER — TELEPHONE (OUTPATIENT)
Dept: ENDOCRINOLOGY | Age: 53
End: 2023-05-09

## 2023-05-09 VITALS
RESPIRATION RATE: 16 BRPM | SYSTOLIC BLOOD PRESSURE: 106 MMHG | HEIGHT: 64 IN | WEIGHT: 193 LBS | DIASTOLIC BLOOD PRESSURE: 65 MMHG | HEART RATE: 64 BPM | BODY MASS INDEX: 32.95 KG/M2

## 2023-05-09 DIAGNOSIS — I25.10 CORONARY ARTERY DISEASE INVOLVING NATIVE CORONARY ARTERY OF NATIVE HEART WITHOUT ANGINA PECTORIS: Chronic | ICD-10-CM

## 2023-05-09 DIAGNOSIS — Z79.4 TYPE 2 DIABETES MELLITUS WITHOUT COMPLICATION, WITH LONG-TERM CURRENT USE OF INSULIN (HCC): Primary | ICD-10-CM

## 2023-05-09 DIAGNOSIS — E11.9 TYPE 2 DIABETES MELLITUS WITHOUT COMPLICATION, WITH LONG-TERM CURRENT USE OF INSULIN (HCC): Primary | ICD-10-CM

## 2023-05-09 DIAGNOSIS — E11.9 TYPE 2 DIABETES MELLITUS WITHOUT COMPLICATION, WITH LONG-TERM CURRENT USE OF INSULIN (HCC): ICD-10-CM

## 2023-05-09 DIAGNOSIS — Z79.4 TYPE 2 DIABETES MELLITUS WITHOUT COMPLICATION, WITH LONG-TERM CURRENT USE OF INSULIN (HCC): ICD-10-CM

## 2023-05-09 DIAGNOSIS — E06.3 HYPOTHYROIDISM DUE TO HASHIMOTO'S THYROIDITIS: ICD-10-CM

## 2023-05-09 DIAGNOSIS — I50.32 CHRONIC HEART FAILURE WITH PRESERVED EJECTION FRACTION (HCC): Chronic | ICD-10-CM

## 2023-05-09 DIAGNOSIS — E03.8 HYPOTHYROIDISM DUE TO HASHIMOTO'S THYROIDITIS: ICD-10-CM

## 2023-05-09 PROCEDURE — 3017F COLORECTAL CA SCREEN DOC REV: CPT | Performed by: INTERNAL MEDICINE

## 2023-05-09 PROCEDURE — 1036F TOBACCO NON-USER: CPT | Performed by: INTERNAL MEDICINE

## 2023-05-09 PROCEDURE — 2022F DILAT RTA XM EVC RTNOPTHY: CPT | Performed by: INTERNAL MEDICINE

## 2023-05-09 PROCEDURE — 99214 OFFICE O/P EST MOD 30 MIN: CPT | Performed by: INTERNAL MEDICINE

## 2023-05-09 PROCEDURE — G8427 DOCREV CUR MEDS BY ELIG CLIN: HCPCS | Performed by: INTERNAL MEDICINE

## 2023-05-09 PROCEDURE — 3074F SYST BP LT 130 MM HG: CPT | Performed by: INTERNAL MEDICINE

## 2023-05-09 PROCEDURE — 3046F HEMOGLOBIN A1C LEVEL >9.0%: CPT | Performed by: INTERNAL MEDICINE

## 2023-05-09 PROCEDURE — 3078F DIAST BP <80 MM HG: CPT | Performed by: INTERNAL MEDICINE

## 2023-05-09 PROCEDURE — G8417 CALC BMI ABV UP PARAM F/U: HCPCS | Performed by: INTERNAL MEDICINE

## 2023-05-09 RX ORDER — BLOOD SUGAR DIAGNOSTIC
STRIP MISCELLANEOUS
Qty: 200 STRIP | Refills: 5 | Status: SHIPPED | OUTPATIENT
Start: 2023-05-09 | End: 2023-05-09 | Stop reason: SDUPTHER

## 2023-05-09 RX ORDER — BLOOD SUGAR DIAGNOSTIC
STRIP MISCELLANEOUS
Qty: 200 STRIP | Refills: 5 | Status: SHIPPED | OUTPATIENT
Start: 2023-05-09

## 2023-05-09 NOTE — TELEPHONE ENCOUNTER
Fax from [employer] is pt testing her blood glucose?   Need signature w/ a frequency   Please resend    Re: One Touch Verio Strips

## 2023-05-09 NOTE — PROGRESS NOTES
0    spironolactone (ALDACTONE) 25 MG tablet Take 2 tablets by mouth 2 times daily 30 tablet 0    levothyroxine (SYNTHROID) 150 MCG tablet TAKE 1 TABLET BY MOUTH IN THE MORNING before breakfast 90 tablet 1    leflunomide (ARAVA) 20 MG tablet TAKE 1 TABLET BY MOUTH EVERY DAY 90 tablet 0    vitamin D3 (CHOLECALCIFEROL) 25 MCG (1000 UT) TABS tablet TAKE 3 TABLETS BY MOUTH ONE TIME A DAY 90 tablet 3    lubiprostone (AMITIZA) 24 MCG capsule TAKE 1 CAPSULE BY MOUTH TWO TIMES A DAY WITH MEALS 60 capsule 5    mineral oil-hydrophilic petrolatum (HYDROPHOR) ointment Apply topically 4 times daily as needed to hands and feet. 454 g 5    Compression Sleeves Left arm 2 each 0    Elastic Bandages & Supports (COMPRESSION & SUPPORT GLOVES L) MISC 1 each by Does not apply route daily Left hand 2 each 0    Elastic Bandages & Supports (MEDICAL COMPRESSION STOCKINGS) MISC 2 each by Does not apply route daily On am/off HS. 2 each 0    Urea 40 % LOTN Apply to feet nightly and cover with Aquaphor 325 mL 5    omeprazole (PRILOSEC) 20 MG delayed release capsule TAKE 1 CAPSULE BY MOUTH TWO TIMES A DAY 60 capsule 5    nitroGLYCERIN (NITROSTAT) 0.4 MG SL tablet PLACE ONE TABLET UNDER TONGUE AS NEEDED FOR CHEST PAIN, MAY REPEAT EVERY 5 MINUTES AS NEEDED UP TO A MAX OF 3 TABLETS. IF NO RELIEF AFTER 1 DOSE, CALL 911. (Patient taking differently: PLACE ONE TABLET UNDER TONGUE AS NEEDED FOR CHEST PAIN, MAY REPEAT EVERY 5 MINUTES AS NEEDED UP TO A MAX OF 3 TABLETS.   IF NO RELIEF AFTER 1 DOSE, CALL 911.) 25 tablet 2    metoprolol tartrate (LOPRESSOR) 25 MG tablet TAKE 1 TABLET BY MOUTH TWO TIMES A  tablet 3    SM SENNA-S 8.6-50 MG per tablet TAKE 2 TABLETS BY MOUTH TWO TIMES A  tablet 5    montelukast (SINGULAIR) 10 MG tablet Take 1 tablet by mouth nightly 30 tablet 5    TRESIBA FLEXTOUCH 200 UNIT/ML SOPN INJECT 200 UNITS SUBCUTANEOUSLY ONE TIME DAILY 54 mL 3    Continuous Blood Gluc Sensor (FREESTYLE EMERALD 2 SENSOR)

## 2023-05-10 DIAGNOSIS — K44.9 HIATAL HERNIA WITH GERD: ICD-10-CM

## 2023-05-10 DIAGNOSIS — K21.9 HIATAL HERNIA WITH GERD: ICD-10-CM

## 2023-05-11 ENCOUNTER — HOSPITAL ENCOUNTER (OUTPATIENT)
Dept: ONCOLOGY | Age: 53
Setting detail: INFUSION SERIES
Discharge: HOME | End: 2023-05-11
Payer: COMMERCIAL

## 2023-05-11 ENCOUNTER — TELEPHONE (OUTPATIENT)
Dept: CARDIOLOGY CLINIC | Age: 53
End: 2023-05-11

## 2023-05-11 VITALS
TEMPERATURE: 96.9 F | HEART RATE: 71 BPM | SYSTOLIC BLOOD PRESSURE: 136 MMHG | RESPIRATION RATE: 16 BRPM | DIASTOLIC BLOOD PRESSURE: 69 MMHG

## 2023-05-11 DIAGNOSIS — R06.02 SOB (SHORTNESS OF BREATH): ICD-10-CM

## 2023-05-11 DIAGNOSIS — N18.4 CKD (CHRONIC KIDNEY DISEASE) STAGE 4, GFR 15-29 ML/MIN (HCC): ICD-10-CM

## 2023-05-11 DIAGNOSIS — I10 ESSENTIAL HYPERTENSION: ICD-10-CM

## 2023-05-11 DIAGNOSIS — I25.10 CORONARY ARTERY DISEASE INVOLVING NATIVE CORONARY ARTERY OF NATIVE HEART WITHOUT ANGINA PECTORIS: ICD-10-CM

## 2023-05-11 DIAGNOSIS — I50.32 CHRONIC DIASTOLIC CONGESTIVE HEART FAILURE (HCC): ICD-10-CM

## 2023-05-11 DIAGNOSIS — I50.32 CHRONIC HEART FAILURE WITH PRESERVED EJECTION FRACTION (HCC): Primary | ICD-10-CM

## 2023-05-11 LAB
ANION GAP SERPL CALCULATED.3IONS-SCNC: 10 MMOL/L (ref 3–16)
BUN SERPL-MCNC: 24 MG/DL (ref 7–20)
CALCIUM SERPL-MCNC: 8.9 MG/DL (ref 8.3–10.6)
CHLORIDE SERPL-SCNC: 98 MMOL/L (ref 99–110)
CO2 SERPL-SCNC: 34 MMOL/L (ref 21–32)
CREAT SERPL-MCNC: 1.4 MG/DL (ref 0.6–1.1)
GFR SERPLBLD CREATININE-BSD FMLA CKD-EPI: 45 ML/MIN/{1.73_M2}
GLUCOSE SERPL-MCNC: 245 MG/DL (ref 70–99)
NT-PROBNP SERPL-MCNC: 221 PG/ML (ref 0–124)
POTASSIUM SERPL-SCNC: 4.2 MMOL/L (ref 3.5–5.1)
SODIUM SERPL-SCNC: 142 MMOL/L (ref 136–145)

## 2023-05-11 PROCEDURE — 83880 ASSAY OF NATRIURETIC PEPTIDE: CPT

## 2023-05-11 PROCEDURE — 6360000002 HC RX W HCPCS: Performed by: INTERNAL MEDICINE

## 2023-05-11 PROCEDURE — 80048 BASIC METABOLIC PNL TOTAL CA: CPT

## 2023-05-11 PROCEDURE — 96374 THER/PROPH/DIAG INJ IV PUSH: CPT

## 2023-05-11 PROCEDURE — 2580000003 HC RX 258: Performed by: INTERNAL MEDICINE

## 2023-05-11 PROCEDURE — 99211 OFF/OP EST MAY X REQ PHY/QHP: CPT

## 2023-05-11 RX ORDER — OMEPRAZOLE 20 MG/1
CAPSULE, DELAYED RELEASE ORAL
Qty: 90 CAPSULE | Refills: 0 | Status: SHIPPED | OUTPATIENT
Start: 2023-05-11

## 2023-05-11 RX ORDER — SODIUM CHLORIDE 0.9 % (FLUSH) 0.9 %
5-40 SYRINGE (ML) INJECTION ONCE
Start: 2023-05-18 | End: 2023-05-18

## 2023-05-11 RX ORDER — FUROSEMIDE 10 MG/ML
120 INJECTION INTRAMUSCULAR; INTRAVENOUS ONCE
Start: 2023-05-18 | End: 2023-05-18

## 2023-05-11 RX ORDER — FUROSEMIDE 10 MG/ML
120 INJECTION INTRAMUSCULAR; INTRAVENOUS ONCE
Status: COMPLETED | OUTPATIENT
Start: 2023-05-11 | End: 2023-05-11

## 2023-05-11 RX ORDER — TAMSULOSIN HYDROCHLORIDE 0.4 MG/1
0.4 CAPSULE ORAL 2 TIMES DAILY
Qty: 90 CAPSULE | Refills: 0 | Status: SHIPPED | OUTPATIENT
Start: 2023-05-11

## 2023-05-11 RX ORDER — SODIUM CHLORIDE 0.9 % (FLUSH) 0.9 %
5-40 SYRINGE (ML) INJECTION ONCE
Status: COMPLETED | OUTPATIENT
Start: 2023-05-11 | End: 2023-05-11

## 2023-05-11 RX ADMIN — Medication 10 ML: at 14:13

## 2023-05-11 RX ADMIN — FUROSEMIDE 120 MG: 10 INJECTION, SOLUTION INTRAMUSCULAR; INTRAVENOUS at 14:13

## 2023-05-11 NOTE — PROGRESS NOTES
Patient to department for outpatient labs and lasix. Here from cardiology office. Labs drawn followed by lasix 120 mg at 20 mg per minute. Tolerated well. Patient aware that she will have urinary urgency and frequency. Also aware that this tx may lower his b/p and he may feel dizzy upon standing. All questions answered. To follow up with cardiology office for any further plan of care. To return in 2 weeks for same treatment.

## 2023-05-15 DIAGNOSIS — G44.221 CHRONIC TENSION-TYPE HEADACHE, INTRACTABLE: ICD-10-CM

## 2023-05-15 RX ORDER — BUTALBITAL, ACETAMINOPHEN AND CAFFEINE 50; 325; 40 MG/1; MG/1; MG/1
TABLET ORAL
Qty: 30 TABLET | Refills: 0 | Status: SHIPPED | OUTPATIENT
Start: 2023-05-15

## 2023-05-15 RX ORDER — DULAGLUTIDE 0.75 MG/.5ML
INJECTION, SOLUTION SUBCUTANEOUS
Qty: 2 ML | Refills: 3 | Status: SHIPPED | OUTPATIENT
Start: 2023-05-15

## 2023-05-15 NOTE — TELEPHONE ENCOUNTER
Fax from [address] [facility] w/ denial for [employer]    -coverage is provided when pt has a history of at least 120 days of therapy w/ 3 pref meds cov'd by plan

## 2023-05-19 ENCOUNTER — TELEPHONE (OUTPATIENT)
Dept: INTERNAL MEDICINE CLINIC | Age: 53
End: 2023-05-19

## 2023-05-19 NOTE — TELEPHONE ENCOUNTER
ECC transferred call spouse c/o patient with 2 falls last night, dizziness states her vs are good surrounding time of falls. Recommended pt be taken to ER being Friday 3 pm. Patient acknowledged and scheduled appt. No injury mentioned, concern for dizziness to be addressed at appointment.

## 2023-05-22 ENCOUNTER — TELEPHONE (OUTPATIENT)
Dept: ENDOCRINOLOGY | Age: 53
End: 2023-05-22

## 2023-05-22 ENCOUNTER — OFFICE VISIT (OUTPATIENT)
Dept: INTERNAL MEDICINE CLINIC | Age: 53
End: 2023-05-22
Payer: COMMERCIAL

## 2023-05-22 VITALS
DIASTOLIC BLOOD PRESSURE: 72 MMHG | WEIGHT: 188.4 LBS | HEIGHT: 64 IN | SYSTOLIC BLOOD PRESSURE: 120 MMHG | OXYGEN SATURATION: 94 % | BODY MASS INDEX: 32.17 KG/M2 | HEART RATE: 65 BPM

## 2023-05-22 DIAGNOSIS — J45.50 SEVERE PERSISTENT ASTHMA WITHOUT COMPLICATION: ICD-10-CM

## 2023-05-22 DIAGNOSIS — M06.00 SERONEGATIVE RHEUMATOID ARTHRITIS (HCC): ICD-10-CM

## 2023-05-22 DIAGNOSIS — T50.905A DRUG-INDUCED NAUSEA AND VOMITING: Primary | ICD-10-CM

## 2023-05-22 DIAGNOSIS — R11.2 DRUG-INDUCED NAUSEA AND VOMITING: Primary | ICD-10-CM

## 2023-05-22 DIAGNOSIS — E11.65 POORLY CONTROLLED TYPE 2 DIABETES MELLITUS (HCC): ICD-10-CM

## 2023-05-22 DIAGNOSIS — M51.36 DDD (DEGENERATIVE DISC DISEASE), LUMBAR: ICD-10-CM

## 2023-05-22 PROBLEM — K12.0 CANKER SORE: Status: RESOLVED | Noted: 2020-08-16 | Resolved: 2023-05-22

## 2023-05-22 PROBLEM — R29.898 WEAKNESS OF RIGHT LOWER EXTREMITY: Status: RESOLVED | Noted: 2020-12-26 | Resolved: 2023-05-22

## 2023-05-22 PROBLEM — G93.41 ENCEPHALOPATHY, METABOLIC: Status: RESOLVED | Noted: 2023-03-10 | Resolved: 2023-05-22

## 2023-05-22 PROBLEM — M10.9 ACUTE GOUT OF RIGHT ANKLE: Status: RESOLVED | Noted: 2022-05-01 | Resolved: 2023-05-22

## 2023-05-22 PROBLEM — I63.9 ACUTE CVA (CEREBROVASCULAR ACCIDENT) (HCC): Status: RESOLVED | Noted: 2023-03-09 | Resolved: 2023-05-22
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
## 2023-05-22 PROBLEM — R19.04 LEFT LOWER QUADRANT ABDOMINAL WALL MASS: Status: RESOLVED | Noted: 2021-05-09 | Resolved: 2023-05-22

## 2023-05-22 PROBLEM — R13.12 OROPHARYNGEAL DYSPHAGIA: Status: RESOLVED | Noted: 2018-04-10 | Resolved: 2023-05-22

## 2023-05-22 PROBLEM — R13.19 ESOPHAGEAL DYSPHAGIA: Status: RESOLVED | Noted: 2020-12-26 | Resolved: 2023-05-22

## 2023-05-22 PROBLEM — K13.79 MOUTH PAIN: Status: RESOLVED | Noted: 2018-07-03 | Resolved: 2023-05-22

## 2023-05-22 PROBLEM — J45.909 ASTHMA: Status: RESOLVED | Noted: 2018-01-12 | Resolved: 2023-05-22

## 2023-05-22 PROCEDURE — G8417 CALC BMI ABV UP PARAM F/U: HCPCS | Performed by: INTERNAL MEDICINE

## 2023-05-22 PROCEDURE — 99214 OFFICE O/P EST MOD 30 MIN: CPT | Performed by: INTERNAL MEDICINE

## 2023-05-22 PROCEDURE — 3074F SYST BP LT 130 MM HG: CPT | Performed by: INTERNAL MEDICINE

## 2023-05-22 PROCEDURE — G8427 DOCREV CUR MEDS BY ELIG CLIN: HCPCS | Performed by: INTERNAL MEDICINE

## 2023-05-22 PROCEDURE — 3017F COLORECTAL CA SCREEN DOC REV: CPT | Performed by: INTERNAL MEDICINE

## 2023-05-22 PROCEDURE — 1036F TOBACCO NON-USER: CPT | Performed by: INTERNAL MEDICINE

## 2023-05-22 PROCEDURE — 3078F DIAST BP <80 MM HG: CPT | Performed by: INTERNAL MEDICINE

## 2023-05-22 PROCEDURE — 2022F DILAT RTA XM EVC RTNOPTHY: CPT | Performed by: INTERNAL MEDICINE

## 2023-05-22 PROCEDURE — 3046F HEMOGLOBIN A1C LEVEL >9.0%: CPT | Performed by: INTERNAL MEDICINE

## 2023-05-22 RX ORDER — LEFLUNOMIDE 20 MG/1
20 TABLET ORAL DAILY
Qty: 90 TABLET | Refills: 0 | Status: CANCELLED | OUTPATIENT
Start: 2023-05-22

## 2023-05-22 RX ORDER — SULFASALAZINE 500 MG/1
500 TABLET ORAL 2 TIMES DAILY
Qty: 60 TABLET | Refills: 1 | Status: SHIPPED | OUTPATIENT
Start: 2023-05-22 | End: 2023-06-21

## 2023-05-22 RX ORDER — PREGABALIN 75 MG/1
75 CAPSULE ORAL 2 TIMES DAILY
Qty: 60 CAPSULE | Refills: 1 | Status: SHIPPED | OUTPATIENT
Start: 2023-05-22 | End: 2023-07-21

## 2023-05-22 RX ORDER — LEFLUNOMIDE 20 MG/1
20 TABLET ORAL DAILY
Qty: 90 TABLET | Refills: 0 | Status: SHIPPED | OUTPATIENT
Start: 2023-05-22

## 2023-05-22 RX ORDER — PREGABALIN 75 MG/1
75 CAPSULE ORAL 2 TIMES DAILY
Qty: 60 CAPSULE | Refills: 2 | Status: CANCELLED | OUTPATIENT
Start: 2023-05-22 | End: 2023-06-21

## 2023-05-22 RX ORDER — SULFASALAZINE 500 MG/1
500 TABLET ORAL 2 TIMES DAILY
Qty: 60 TABLET | Refills: 0 | Status: CANCELLED | OUTPATIENT
Start: 2023-05-22 | End: 2023-06-21

## 2023-05-22 RX ORDER — FLUTICASONE PROPIONATE AND SALMETEROL 250; 50 UG/1; UG/1
1 POWDER RESPIRATORY (INHALATION) 2 TIMES DAILY
Qty: 3 EACH | Refills: 3 | Status: SHIPPED | OUTPATIENT
Start: 2023-05-22

## 2023-05-22 ASSESSMENT — ENCOUNTER SYMPTOMS
CONSTIPATION: 0
DIARRHEA: 0
VOMITING: 1
NAUSEA: 1
SHORTNESS OF BREATH: 0
CHEST TIGHTNESS: 0
WHEEZING: 1

## 2023-05-22 ASSESSMENT — PATIENT HEALTH QUESTIONNAIRE - PHQ9: DEPRESSION UNABLE TO ASSESS: URGENT/EMERGENT SITUATION

## 2023-05-22 NOTE — PATIENT INSTRUCTIONS
Please take Advair 1 puff twice a day every day. Advair can cause thrush (a yeast infection in your mouth) so please make sure to rinse your mouth out with water after each use. Advair may also make you feel a bit shaky or cause some heart palpitations for the first 15 minutes or so after using it. This is normal and due to the medication that helps to relax your lungs.

## 2023-05-22 NOTE — TELEPHONE ENCOUNTER
Pt's  calling and states pt has had side effects from her new medication prescribed Trulicity. She's had dizziness, fallen and vomiting.  He states been going on from [address] (Spouse)   [phone] [facility] Phone)

## 2023-05-23 NOTE — ASSESSMENT & PLAN NOTE
Dr. Edil Toscano recently added Trulicity due to recent hemoglobin A1c, most recently 10.2%. She had 3 days of persistent nausea and vomiting, headache, dizziness, and increased falls following her initial dose of Trulicity. Advised patient to not take her second dose. Recommend reaching out to Dr. Edil Toscano regarding difficulties with Trulicity. Patient remains on Tresiba, Humalog sliding scale, and Steglatro.

## 2023-05-23 NOTE — ASSESSMENT & PLAN NOTE
Patient had severe nausea and vomiting associated with Trulicity. Recommend against continuing Trulicity and advised patient family to reach out to Dr. Sebastian Jones.

## 2023-05-23 NOTE — TELEPHONE ENCOUNTER
Please advise that if she has been vomiting since taking Trulicity then she needs to stop it and hopefully it will get better after 6 to 7 days of the last Trulicity injection her symptoms should disappear, also if she is experiencing dizziness she needs to check her blood pressure and make sure its not getting too low and discussed that part with the cardiologist

## 2023-05-23 NOTE — ASSESSMENT & PLAN NOTE
We will continue to follow with Dr. Yesenia Wright. Patient's  has inquired if there is a closer rheumatologist that they may see but advised him unfortunately that would not be possible. Marley [address] refilled today as Dr. Yesenia Wright will be out of the country until she starts her new job in mid July.

## 2023-05-23 NOTE — ASSESSMENT & PLAN NOTE
Previously on [facility] but was discontinued for unclear reasons. I suspect coverage was an issue. Start Advair 250/50 micrograms 1 puff twice daily.

## 2023-05-25 ENCOUNTER — HOSPITAL ENCOUNTER (OUTPATIENT)
Dept: ONCOLOGY | Age: 53
Setting detail: INFUSION SERIES
Discharge: HOME | End: 2023-05-25
Payer: COMMERCIAL

## 2023-05-25 VITALS
RESPIRATION RATE: 16 BRPM | HEART RATE: 66 BPM | SYSTOLIC BLOOD PRESSURE: 127 MMHG | TEMPERATURE: 96.8 F | DIASTOLIC BLOOD PRESSURE: 77 MMHG

## 2023-05-25 DIAGNOSIS — I50.32 CHRONIC DIASTOLIC CONGESTIVE HEART FAILURE (HCC): ICD-10-CM

## 2023-05-25 DIAGNOSIS — I50.32 CHRONIC HEART FAILURE WITH PRESERVED EJECTION FRACTION (HCC): Primary | ICD-10-CM

## 2023-05-25 DIAGNOSIS — E87.1 HYPONATREMIA: ICD-10-CM

## 2023-05-25 DIAGNOSIS — I10 ESSENTIAL HYPERTENSION: ICD-10-CM

## 2023-05-25 DIAGNOSIS — N18.4 CKD (CHRONIC KIDNEY DISEASE) STAGE 4, GFR 15-29 ML/MIN (HCC): ICD-10-CM

## 2023-05-25 DIAGNOSIS — R06.02 SOB (SHORTNESS OF BREATH): ICD-10-CM

## 2023-05-25 DIAGNOSIS — E89.89 LYMPHEDEMA OF UPPER EXTREMITY FOLLOWING LYMPHADENECTOMY: ICD-10-CM

## 2023-05-25 DIAGNOSIS — I89.0 LYMPHEDEMA OF UPPER EXTREMITY FOLLOWING LYMPHADENECTOMY: ICD-10-CM

## 2023-05-25 LAB
ANION GAP SERPL CALCULATED.3IONS-SCNC: 8 MMOL/L (ref 3–16)
BUN SERPL-MCNC: 32 MG/DL (ref 7–20)
CALCIUM SERPL-MCNC: 9.4 MG/DL (ref 8.3–10.6)
CHLORIDE SERPL-SCNC: 96 MMOL/L (ref 99–110)
CO2 SERPL-SCNC: 35 MMOL/L (ref 21–32)
CREAT SERPL-MCNC: 1.2 MG/DL (ref 0.6–1.1)
GFR SERPLBLD CREATININE-BSD FMLA CKD-EPI: 54 ML/MIN/{1.73_M2}
GLUCOSE SERPL-MCNC: 251 MG/DL (ref 70–99)
NT-PROBNP SERPL-MCNC: 102 PG/ML (ref 0–124)
POTASSIUM SERPL-SCNC: 4.4 MMOL/L (ref 3.5–5.1)
SODIUM SERPL-SCNC: 139 MMOL/L (ref 136–145)

## 2023-05-25 PROCEDURE — 83880 ASSAY OF NATRIURETIC PEPTIDE: CPT

## 2023-05-25 PROCEDURE — 96375 TX/PRO/DX INJ NEW DRUG ADDON: CPT

## 2023-05-25 PROCEDURE — 96413 CHEMO IV INFUSION 1 HR: CPT

## 2023-05-25 PROCEDURE — 6360000002 HC RX W HCPCS: Performed by: INTERNAL MEDICINE

## 2023-05-25 PROCEDURE — 80048 BASIC METABOLIC PNL TOTAL CA: CPT

## 2023-05-25 PROCEDURE — 99211 OFF/OP EST MAY X REQ PHY/QHP: CPT

## 2023-05-25 PROCEDURE — 2580000003 HC RX 258: Performed by: INTERNAL MEDICINE

## 2023-05-25 RX ORDER — SODIUM CHLORIDE 0.9 % (FLUSH) 0.9 %
5-40 SYRINGE (ML) INJECTION ONCE
Status: COMPLETED | OUTPATIENT
Start: 2023-05-25 | End: 2023-05-25

## 2023-05-25 RX ORDER — FUROSEMIDE 10 MG/ML
120 INJECTION INTRAMUSCULAR; INTRAVENOUS ONCE
Start: 2023-06-01 | End: 2023-06-01

## 2023-05-25 RX ORDER — SODIUM CHLORIDE 0.9 % (FLUSH) 0.9 %
5-40 SYRINGE (ML) INJECTION ONCE
Start: 2023-06-01 | End: 2023-06-01

## 2023-05-25 RX ORDER — FUROSEMIDE 10 MG/ML
120 INJECTION INTRAMUSCULAR; INTRAVENOUS ONCE
Status: COMPLETED | OUTPATIENT
Start: 2023-05-25 | End: 2023-05-25

## 2023-05-25 RX ADMIN — Medication 10 ML: at 14:16

## 2023-05-25 RX ADMIN — FUROSEMIDE 120 MG: 10 INJECTION, SOLUTION INTRAVENOUS at 14:16

## 2023-05-25 NOTE — PROGRESS NOTES
Patient to department for outpatient labs and lasix. Here from cardiology office. Labs drawn followed by lasix 120 mg at 20 mg per minute. Tolerated well. Patient aware that she will have urinary urgency and frequency. Also aware that this tx may lower his b/p and he may feel dizzy upon standing. All questions answered. To follow up with cardiology office for any further plan of care.   To return in 2 weeks for same treatment Rolando Guevara with [facility] called to give update on pt's weight, said she was 151 lbs today and is slowly gaining her weight back since having covid  Daughter said pt was 161 lbs before getting covid  Rolando Guevara said she has a note to call PCP's office if pt goes under 150 and over 170, asking if those parameters could be changed between 150-165 lbs so she doesn't have to call every time the pt is weighed?

## 2023-05-30 ENCOUNTER — TELEPHONE (OUTPATIENT)
Dept: CARDIOLOGY CLINIC | Age: 53
End: 2023-05-30

## 2023-05-30 NOTE — TELEPHONE ENCOUNTER
----- Message from Peggy Joyce MD sent at 5/30/2023 12:56 AM EDT -----  Call patient. Labs are really good. Fluid level way down.   ARETHA

## 2023-05-31 DIAGNOSIS — E55.9 VITAMIN D DEFICIENCY: ICD-10-CM

## 2023-05-31 NOTE — TELEPHONE ENCOUNTER
Medication:   Requested Prescriptions     Pending Prescriptions Disp Refills    vitamin D3 (CHOLECALCIFEROL) 25 MCG (1000 UT) TABS tablet [Pharmacy Med Name: Vitamin D3 Oral Tablet 25 MCG (1000 UT)] 90 tablet 3     Sig: TAKE 3 TABLETS BY MOUTH ONE TIME A DAY       Last Filled:      Patient Phone Number: [phone] (home)     Last appt: 5/9/2023   Next appt: 8/29/2023    Last Labs DM:   Lab Results   Component Value Date/Time    VITD25 51.5 08/22/2022 10:30 AM    VITD25 47.8 06/30/2022 01:06 PM

## 2023-06-01 RX ORDER — MELATONIN
Qty: 90 TABLET | Refills: 3 | Status: SHIPPED | OUTPATIENT
Start: 2023-06-01

## 2023-06-07 DIAGNOSIS — K44.9 HIATAL HERNIA WITH GERD: ICD-10-CM

## 2023-06-07 DIAGNOSIS — K21.9 HIATAL HERNIA WITH GERD: ICD-10-CM

## 2023-06-08 ENCOUNTER — HOSPITAL ENCOUNTER (OUTPATIENT)
Dept: ONCOLOGY | Age: 53
Setting detail: INFUSION SERIES
Discharge: HOME | End: 2023-06-08
Payer: COMMERCIAL

## 2023-06-08 DIAGNOSIS — E11.9 TYPE 2 DIABETES MELLITUS WITHOUT COMPLICATION, WITH LONG-TERM CURRENT USE OF INSULIN (HCC): ICD-10-CM

## 2023-06-08 DIAGNOSIS — I10 ESSENTIAL HYPERTENSION: ICD-10-CM

## 2023-06-08 DIAGNOSIS — E89.89 LYMPHEDEMA OF UPPER EXTREMITY FOLLOWING LYMPHADENECTOMY: ICD-10-CM

## 2023-06-08 DIAGNOSIS — I50.32 CHRONIC DIASTOLIC CONGESTIVE HEART FAILURE (HCC): ICD-10-CM

## 2023-06-08 DIAGNOSIS — Z79.4 TYPE 2 DIABETES MELLITUS WITHOUT COMPLICATION, WITH LONG-TERM CURRENT USE OF INSULIN (HCC): ICD-10-CM

## 2023-06-08 DIAGNOSIS — E03.8 HYPOTHYROIDISM DUE TO HASHIMOTO'S THYROIDITIS: ICD-10-CM

## 2023-06-08 DIAGNOSIS — I50.32 CHRONIC HEART FAILURE WITH PRESERVED EJECTION FRACTION (HCC): Primary | Chronic | ICD-10-CM

## 2023-06-08 DIAGNOSIS — E06.3 HYPOTHYROIDISM DUE TO HASHIMOTO'S THYROIDITIS: ICD-10-CM

## 2023-06-08 DIAGNOSIS — I89.0 LYMPHEDEMA OF UPPER EXTREMITY FOLLOWING LYMPHADENECTOMY: ICD-10-CM

## 2023-06-08 DIAGNOSIS — I25.10 CORONARY ARTERY DISEASE INVOLVING NATIVE CORONARY ARTERY OF NATIVE HEART WITHOUT ANGINA PECTORIS: Chronic | ICD-10-CM

## 2023-06-08 DIAGNOSIS — E87.1 HYPONATREMIA: ICD-10-CM

## 2023-06-08 DIAGNOSIS — R06.02 SOB (SHORTNESS OF BREATH): ICD-10-CM

## 2023-06-08 DIAGNOSIS — N18.4 CKD (CHRONIC KIDNEY DISEASE) STAGE 4, GFR 15-29 ML/MIN (HCC): ICD-10-CM

## 2023-06-08 LAB
ANION GAP SERPL CALCULATED.3IONS-SCNC: 12 MMOL/L (ref 3–16)
BUN SERPL-MCNC: 33 MG/DL (ref 7–20)
CALCIUM SERPL-MCNC: 9.4 MG/DL (ref 8.3–10.6)
CHLORIDE SERPL-SCNC: 93 MMOL/L (ref 99–110)
CO2 SERPL-SCNC: 34 MMOL/L (ref 21–32)
CREAT SERPL-MCNC: 1.4 MG/DL (ref 0.6–1.1)
GFR SERPLBLD CREATININE-BSD FMLA CKD-EPI: 45 ML/MIN/{1.73_M2}
GLUCOSE SERPL-MCNC: 116 MG/DL (ref 70–99)
NT-PROBNP SERPL-MCNC: 107 PG/ML (ref 0–124)
POTASSIUM SERPL-SCNC: 4 MMOL/L (ref 3.5–5.1)
SODIUM SERPL-SCNC: 139 MMOL/L (ref 136–145)

## 2023-06-08 PROCEDURE — 83880 ASSAY OF NATRIURETIC PEPTIDE: CPT

## 2023-06-08 PROCEDURE — 6360000002 HC RX W HCPCS: Performed by: INTERNAL MEDICINE

## 2023-06-08 PROCEDURE — 96374 THER/PROPH/DIAG INJ IV PUSH: CPT

## 2023-06-08 PROCEDURE — 99211 OFF/OP EST MAY X REQ PHY/QHP: CPT

## 2023-06-08 PROCEDURE — 80048 BASIC METABOLIC PNL TOTAL CA: CPT

## 2023-06-08 PROCEDURE — 2580000003 HC RX 258: Performed by: INTERNAL MEDICINE

## 2023-06-08 RX ORDER — FUROSEMIDE 10 MG/ML
120 INJECTION INTRAMUSCULAR; INTRAVENOUS ONCE
Status: COMPLETED | OUTPATIENT
Start: 2023-06-08 | End: 2023-06-08

## 2023-06-08 RX ORDER — SODIUM CHLORIDE 0.9 % (FLUSH) 0.9 %
5-40 SYRINGE (ML) INJECTION ONCE
Start: 2023-06-15 | End: 2023-06-15

## 2023-06-08 RX ORDER — TAMSULOSIN HYDROCHLORIDE 0.4 MG/1
0.4 CAPSULE ORAL 2 TIMES DAILY
Qty: 90 CAPSULE | Refills: 1 | Status: SHIPPED | OUTPATIENT
Start: 2023-06-08 | End: 2023-07-10 | Stop reason: SDUPTHER

## 2023-06-08 RX ORDER — OMEPRAZOLE 20 MG/1
CAPSULE, DELAYED RELEASE ORAL
Qty: 180 CAPSULE | Refills: 1 | Status: SHIPPED | OUTPATIENT
Start: 2023-06-08

## 2023-06-08 RX ORDER — FUROSEMIDE 10 MG/ML
120 INJECTION INTRAMUSCULAR; INTRAVENOUS ONCE
Start: 2023-06-15 | End: 2023-06-15

## 2023-06-08 RX ORDER — SODIUM CHLORIDE 0.9 % (FLUSH) 0.9 %
5-40 SYRINGE (ML) INJECTION ONCE
Status: COMPLETED | OUTPATIENT
Start: 2023-06-08 | End: 2023-06-08

## 2023-06-08 RX ADMIN — FUROSEMIDE 120 MG: 10 INJECTION, SOLUTION INTRAMUSCULAR; INTRAVENOUS at 14:42

## 2023-06-08 RX ADMIN — Medication 10 ML: at 14:55

## 2023-06-08 NOTE — PROGRESS NOTES
Patient to department for outpatient labs and lasix. Labs drawn followed by lasix 120 mg at 20 mg per minute. Tolerated well. Patient aware that she will have urinary urgency and frequency. Also aware that this tx may lower his b/p and she may feel dizzy upon standing. All questions answered. To follow up with cardiology office for any further plan of care.   To return in 2 weeks for same treatment

## 2023-06-21 DIAGNOSIS — Z79.4 TYPE 2 DIABETES MELLITUS WITHOUT COMPLICATION, WITH LONG-TERM CURRENT USE OF INSULIN (HCC): Primary | ICD-10-CM

## 2023-06-21 DIAGNOSIS — E11.9 TYPE 2 DIABETES MELLITUS WITHOUT COMPLICATION, WITH LONG-TERM CURRENT USE OF INSULIN (HCC): Primary | ICD-10-CM

## 2023-06-22 ENCOUNTER — HOSPITAL ENCOUNTER (OUTPATIENT)
Dept: ONCOLOGY | Age: 53
Setting detail: INFUSION SERIES
Discharge: HOME | End: 2023-06-22
Payer: COMMERCIAL

## 2023-06-22 VITALS
RESPIRATION RATE: 16 BRPM | TEMPERATURE: 98.7 F | SYSTOLIC BLOOD PRESSURE: 113 MMHG | HEART RATE: 66 BPM | DIASTOLIC BLOOD PRESSURE: 78 MMHG

## 2023-06-22 DIAGNOSIS — I50.32 CHRONIC HEART FAILURE WITH PRESERVED EJECTION FRACTION (HCC): Primary | ICD-10-CM

## 2023-06-22 DIAGNOSIS — E87.1 HYPONATREMIA: ICD-10-CM

## 2023-06-22 DIAGNOSIS — R06.02 SOB (SHORTNESS OF BREATH): ICD-10-CM

## 2023-06-22 DIAGNOSIS — E89.89 LYMPHEDEMA OF UPPER EXTREMITY FOLLOWING LYMPHADENECTOMY: ICD-10-CM

## 2023-06-22 DIAGNOSIS — I10 ESSENTIAL HYPERTENSION: ICD-10-CM

## 2023-06-22 DIAGNOSIS — I50.32 CHRONIC DIASTOLIC CONGESTIVE HEART FAILURE (HCC): ICD-10-CM

## 2023-06-22 DIAGNOSIS — N18.4 CKD (CHRONIC KIDNEY DISEASE) STAGE 4, GFR 15-29 ML/MIN (HCC): ICD-10-CM

## 2023-06-22 DIAGNOSIS — I89.0 LYMPHEDEMA OF UPPER EXTREMITY FOLLOWING LYMPHADENECTOMY: ICD-10-CM

## 2023-06-22 LAB
ANION GAP SERPL CALCULATED.3IONS-SCNC: 13 MMOL/L (ref 3–16)
BUN SERPL-MCNC: 36 MG/DL (ref 7–20)
CALCIUM SERPL-MCNC: 8.9 MG/DL (ref 8.3–10.6)
CHLORIDE SERPL-SCNC: 88 MMOL/L (ref 99–110)
CO2 SERPL-SCNC: 31 MMOL/L (ref 21–32)
CREAT SERPL-MCNC: 1.3 MG/DL (ref 0.6–1.1)
GFR SERPLBLD CREATININE-BSD FMLA CKD-EPI: 49 ML/MIN/{1.73_M2}
GLUCOSE SERPL-MCNC: 423 MG/DL (ref 70–99)
NT-PROBNP SERPL-MCNC: 226 PG/ML (ref 0–124)
POTASSIUM SERPL-SCNC: 4.2 MMOL/L (ref 3.5–5.1)
SODIUM SERPL-SCNC: 132 MMOL/L (ref 136–145)

## 2023-06-22 PROCEDURE — 2580000003 HC RX 258: Performed by: INTERNAL MEDICINE

## 2023-06-22 PROCEDURE — 96374 THER/PROPH/DIAG INJ IV PUSH: CPT

## 2023-06-22 PROCEDURE — 6360000002 HC RX W HCPCS: Performed by: INTERNAL MEDICINE

## 2023-06-22 PROCEDURE — 83880 ASSAY OF NATRIURETIC PEPTIDE: CPT

## 2023-06-22 PROCEDURE — 80048 BASIC METABOLIC PNL TOTAL CA: CPT

## 2023-06-22 PROCEDURE — 99211 OFF/OP EST MAY X REQ PHY/QHP: CPT

## 2023-06-22 RX ORDER — RANOLAZINE 500 MG/1
TABLET, EXTENDED RELEASE ORAL
Qty: 180 TABLET | Refills: 3 | Status: SHIPPED | OUTPATIENT
Start: 2023-06-22

## 2023-06-22 RX ORDER — FUROSEMIDE 10 MG/ML
120 INJECTION INTRAMUSCULAR; INTRAVENOUS ONCE
Start: 2023-06-29 | End: 2023-06-29

## 2023-06-22 RX ORDER — FUROSEMIDE 10 MG/ML
120 INJECTION INTRAMUSCULAR; INTRAVENOUS ONCE
Status: COMPLETED | OUTPATIENT
Start: 2023-06-22 | End: 2023-06-22

## 2023-06-22 RX ORDER — ERTUGLIFLOZIN 5 MG/1
TABLET, FILM COATED ORAL
Qty: 90 TABLET | Refills: 1 | Status: SHIPPED | OUTPATIENT
Start: 2023-06-22

## 2023-06-22 RX ORDER — SODIUM CHLORIDE 0.9 % (FLUSH) 0.9 %
5-40 SYRINGE (ML) INJECTION ONCE
Start: 2023-06-29 | End: 2023-06-29

## 2023-06-22 RX ORDER — SODIUM CHLORIDE 0.9 % (FLUSH) 0.9 %
5-40 SYRINGE (ML) INJECTION ONCE
Status: COMPLETED | OUTPATIENT
Start: 2023-06-22 | End: 2023-06-22

## 2023-06-22 RX ADMIN — FUROSEMIDE 120 MG: 10 INJECTION, SOLUTION INTRAMUSCULAR; INTRAVENOUS at 14:18

## 2023-06-22 RX ADMIN — Medication 10 ML: at 14:18

## 2023-06-22 NOTE — PROGRESS NOTES
Pt ambulatory to infusion center for Lasix IVP. IV access obtained. Labs drawn. IVP Lasix administered; 120 mg IVP given over 20 minutes. IV removed. Pt/spouse denied need for printed AVS. Pt to return in 2 weeks for same treatment.

## 2023-06-23 ENCOUNTER — TELEPHONE (OUTPATIENT)
Dept: INTERNAL MEDICINE CLINIC | Age: 53
End: 2023-06-23

## 2023-06-23 ENCOUNTER — TELEPHONE (OUTPATIENT)
Dept: CARDIOLOGY CLINIC | Age: 53
End: 2023-06-23

## 2023-06-23 NOTE — TELEPHONE ENCOUNTER
Called Dr. Barajas [facility] office for blood sugar of [facility]. Sent the labs via epic.     They will notify MD.

## 2023-06-29 ENCOUNTER — TELEPHONE (OUTPATIENT)
Dept: WOMENS IMAGING | Age: 53
End: 2023-06-29

## 2023-07-05 ENCOUNTER — OFFICE VISIT (OUTPATIENT)
Dept: CARDIOLOGY CLINIC | Age: 53
End: 2023-07-05
Payer: COMMERCIAL

## 2023-07-05 VITALS
HEART RATE: 75 BPM | OXYGEN SATURATION: 91 % | BODY MASS INDEX: 31.58 KG/M2 | WEIGHT: 185 LBS | HEIGHT: 64 IN | DIASTOLIC BLOOD PRESSURE: 70 MMHG | SYSTOLIC BLOOD PRESSURE: 124 MMHG

## 2023-07-05 DIAGNOSIS — R06.02 SOB (SHORTNESS OF BREATH): ICD-10-CM

## 2023-07-05 DIAGNOSIS — I50.32 CHRONIC HEART FAILURE WITH PRESERVED EJECTION FRACTION (HCC): Primary | ICD-10-CM

## 2023-07-05 DIAGNOSIS — N18.4 CKD (CHRONIC KIDNEY DISEASE) STAGE 4, GFR 15-29 ML/MIN (HCC): ICD-10-CM

## 2023-07-05 DIAGNOSIS — E89.89 LYMPHEDEMA OF UPPER EXTREMITY FOLLOWING LYMPHADENECTOMY: ICD-10-CM

## 2023-07-05 DIAGNOSIS — E87.1 HYPONATREMIA: ICD-10-CM

## 2023-07-05 DIAGNOSIS — I10 ESSENTIAL HYPERTENSION: ICD-10-CM

## 2023-07-05 DIAGNOSIS — I89.0 LYMPHEDEMA OF UPPER EXTREMITY FOLLOWING LYMPHADENECTOMY: ICD-10-CM

## 2023-07-05 PROCEDURE — 1036F TOBACCO NON-USER: CPT | Performed by: INTERNAL MEDICINE

## 2023-07-05 PROCEDURE — G8417 CALC BMI ABV UP PARAM F/U: HCPCS | Performed by: INTERNAL MEDICINE

## 2023-07-05 PROCEDURE — 3074F SYST BP LT 130 MM HG: CPT | Performed by: INTERNAL MEDICINE

## 2023-07-05 PROCEDURE — 3017F COLORECTAL CA SCREEN DOC REV: CPT | Performed by: INTERNAL MEDICINE

## 2023-07-05 PROCEDURE — G8427 DOCREV CUR MEDS BY ELIG CLIN: HCPCS | Performed by: INTERNAL MEDICINE

## 2023-07-05 PROCEDURE — 99215 OFFICE O/P EST HI 40 MIN: CPT | Performed by: INTERNAL MEDICINE

## 2023-07-05 PROCEDURE — 3078F DIAST BP <80 MM HG: CPT | Performed by: INTERNAL MEDICINE

## 2023-07-05 RX ORDER — NITROGLYCERIN 0.4 MG/1
TABLET SUBLINGUAL
Qty: 25 TABLET | Refills: 2 | Status: SHIPPED | OUTPATIENT
Start: 2023-07-05

## 2023-07-06 ENCOUNTER — TELEPHONE (OUTPATIENT)
Dept: CARDIOLOGY CLINIC | Age: 53
End: 2023-07-06

## 2023-07-06 ENCOUNTER — HOSPITAL ENCOUNTER (OUTPATIENT)
Dept: ONCOLOGY | Age: 53
Setting detail: INFUSION SERIES
Discharge: HOME | End: 2023-07-06
Payer: COMMERCIAL

## 2023-07-06 VITALS
TEMPERATURE: 97 F | RESPIRATION RATE: 18 BRPM | HEART RATE: 66 BPM | SYSTOLIC BLOOD PRESSURE: 110 MMHG | DIASTOLIC BLOOD PRESSURE: 64 MMHG

## 2023-07-06 DIAGNOSIS — I10 ESSENTIAL HYPERTENSION: ICD-10-CM

## 2023-07-06 DIAGNOSIS — I89.0 LYMPHEDEMA OF UPPER EXTREMITY FOLLOWING LYMPHADENECTOMY: ICD-10-CM

## 2023-07-06 DIAGNOSIS — E89.89 LYMPHEDEMA OF UPPER EXTREMITY FOLLOWING LYMPHADENECTOMY: ICD-10-CM

## 2023-07-06 DIAGNOSIS — R06.02 SOB (SHORTNESS OF BREATH): ICD-10-CM

## 2023-07-06 DIAGNOSIS — I50.32 CHRONIC DIASTOLIC CONGESTIVE HEART FAILURE (HCC): ICD-10-CM

## 2023-07-06 DIAGNOSIS — E87.1 HYPONATREMIA: ICD-10-CM

## 2023-07-06 DIAGNOSIS — I50.32 CHRONIC HEART FAILURE WITH PRESERVED EJECTION FRACTION (HCC): Primary | ICD-10-CM

## 2023-07-06 DIAGNOSIS — N18.4 CKD (CHRONIC KIDNEY DISEASE) STAGE 4, GFR 15-29 ML/MIN (HCC): ICD-10-CM

## 2023-07-06 LAB
ANION GAP SERPL CALCULATED.3IONS-SCNC: 8 MMOL/L (ref 3–16)
BUN SERPL-MCNC: 50 MG/DL (ref 7–20)
CALCIUM SERPL-MCNC: 9.7 MG/DL (ref 8.3–10.6)
CHLORIDE SERPL-SCNC: 94 MMOL/L (ref 99–110)
CO2 SERPL-SCNC: 34 MMOL/L (ref 21–32)
CREAT SERPL-MCNC: 1.2 MG/DL (ref 0.6–1.1)
GFR SERPLBLD CREATININE-BSD FMLA CKD-EPI: 54 ML/MIN/{1.73_M2}
GLUCOSE SERPL-MCNC: 299 MG/DL (ref 70–99)
NT-PROBNP SERPL-MCNC: 272 PG/ML (ref 0–124)
POTASSIUM SERPL-SCNC: 4.4 MMOL/L (ref 3.5–5.1)
SODIUM SERPL-SCNC: 136 MMOL/L (ref 136–145)

## 2023-07-06 PROCEDURE — 2580000003 HC RX 258: Performed by: INTERNAL MEDICINE

## 2023-07-06 PROCEDURE — 6360000002 HC RX W HCPCS: Performed by: INTERNAL MEDICINE

## 2023-07-06 PROCEDURE — 96374 THER/PROPH/DIAG INJ IV PUSH: CPT

## 2023-07-06 PROCEDURE — 99211 OFF/OP EST MAY X REQ PHY/QHP: CPT

## 2023-07-06 PROCEDURE — 83880 ASSAY OF NATRIURETIC PEPTIDE: CPT

## 2023-07-06 PROCEDURE — 80048 BASIC METABOLIC PNL TOTAL CA: CPT

## 2023-07-06 RX ORDER — FUROSEMIDE 10 MG/ML
120 INJECTION INTRAMUSCULAR; INTRAVENOUS ONCE
Status: COMPLETED | OUTPATIENT
Start: 2023-07-06 | End: 2023-07-06

## 2023-07-06 RX ORDER — SODIUM CHLORIDE 0.9 % (FLUSH) 0.9 %
5-40 SYRINGE (ML) INJECTION ONCE
Start: 2023-07-13 | End: 2023-07-13

## 2023-07-06 RX ORDER — SODIUM CHLORIDE 0.9 % (FLUSH) 0.9 %
5-40 SYRINGE (ML) INJECTION ONCE
Status: COMPLETED | OUTPATIENT
Start: 2023-07-06 | End: 2023-07-06

## 2023-07-06 RX ORDER — FUROSEMIDE 10 MG/ML
120 INJECTION INTRAMUSCULAR; INTRAVENOUS ONCE
Start: 2023-07-13 | End: 2023-07-13

## 2023-07-06 RX ADMIN — FUROSEMIDE 120 MG: 10 INJECTION, SOLUTION INTRAMUSCULAR; INTRAVENOUS at 14:24

## 2023-07-06 RX ADMIN — Medication 20 ML: at 14:45

## 2023-07-06 NOTE — PROGRESS NOTES
Pt ambulatory to infusion center for Lasix IVP. IV access obtained. Labs drawn. IVP Lasix administered; 120 mg IVP given at 10 mg per minute. IV removed. Pt/spouse denied need for printed AVS. Pt to return in 2 weeks for same treatment.

## 2023-07-10 ENCOUNTER — OFFICE VISIT (OUTPATIENT)
Dept: INTERNAL MEDICINE CLINIC | Age: 53
End: 2023-07-10
Payer: COMMERCIAL

## 2023-07-10 VITALS
BODY MASS INDEX: 32.23 KG/M2 | WEIGHT: 188.8 LBS | SYSTOLIC BLOOD PRESSURE: 116 MMHG | OXYGEN SATURATION: 93 % | HEART RATE: 68 BPM | HEIGHT: 64 IN | DIASTOLIC BLOOD PRESSURE: 60 MMHG

## 2023-07-10 DIAGNOSIS — R42 VERTIGO: ICD-10-CM

## 2023-07-10 DIAGNOSIS — R39.11 URINARY HESITANCY: ICD-10-CM

## 2023-07-10 DIAGNOSIS — K14.6 TONGUE PAIN: ICD-10-CM

## 2023-07-10 DIAGNOSIS — Z79.899 MEDICATION MANAGEMENT: ICD-10-CM

## 2023-07-10 DIAGNOSIS — B37.0 THRUSH: ICD-10-CM

## 2023-07-10 DIAGNOSIS — R35.0 URINARY FREQUENCY: ICD-10-CM

## 2023-07-10 DIAGNOSIS — I50.9 CHRONIC CONGESTIVE HEART FAILURE, UNSPECIFIED HEART FAILURE TYPE (HCC): ICD-10-CM

## 2023-07-10 PROCEDURE — 3078F DIAST BP <80 MM HG: CPT | Performed by: INTERNAL MEDICINE

## 2023-07-10 PROCEDURE — 3017F COLORECTAL CA SCREEN DOC REV: CPT | Performed by: INTERNAL MEDICINE

## 2023-07-10 PROCEDURE — 99214 OFFICE O/P EST MOD 30 MIN: CPT | Performed by: INTERNAL MEDICINE

## 2023-07-10 PROCEDURE — 1036F TOBACCO NON-USER: CPT | Performed by: INTERNAL MEDICINE

## 2023-07-10 PROCEDURE — 3074F SYST BP LT 130 MM HG: CPT | Performed by: INTERNAL MEDICINE

## 2023-07-10 PROCEDURE — G8427 DOCREV CUR MEDS BY ELIG CLIN: HCPCS | Performed by: INTERNAL MEDICINE

## 2023-07-10 PROCEDURE — G8417 CALC BMI ABV UP PARAM F/U: HCPCS | Performed by: INTERNAL MEDICINE

## 2023-07-10 RX ORDER — TAMSULOSIN HYDROCHLORIDE 0.4 MG/1
0.4 CAPSULE ORAL NIGHTLY
Qty: 90 CAPSULE | Refills: 1 | Status: SHIPPED
Start: 2023-07-10

## 2023-07-10 NOTE — PATIENT INSTRUCTIONS
Try taking Flomax once a day in the evening. If you find that you are getting up frequently at night to urinate, please try taking it earlier in the day. If you find that you are again having difficulty urinating, please resume taking Flomax twice daily.

## 2023-07-10 NOTE — ASSESSMENT & PLAN NOTE
Patient has noticed increased urinary frequency since starting on Flomax. We will try decreasing Flomax to once daily. If urinary hesitancy worsens, will have to resume higher dose of Flomax. Advised patient that she may support different timing options for taking Flomax depending on when she is more symptomatic. Recommend trying Flomax at at bedtime to start but if she finds that she wakes up frequently at night to use the restroom she should change to a.m. dosing. Offered pelvic floor physical therapy but patient declines.

## 2023-07-10 NOTE — ASSESSMENT & PLAN NOTE
Better on Flomax but is now having urinary urgency. We will try decreasing Flomax from twice daily to once daily. If urinary hesitancy returns, we will need to resume Flomax twice daily. Has been released by urology.

## 2023-07-10 NOTE — ASSESSMENT & PLAN NOTE
Stable. Continues to follow with Dr. Elizabeth Good. Weight continues to fluctuate and is currently mildly elevated.

## 2023-07-11 RX ORDER — TORSEMIDE 100 MG/1
50 TABLET ORAL 2 TIMES DAILY
Qty: 30 TABLET | Refills: 6 | Status: SHIPPED | OUTPATIENT
Start: 2023-07-11

## 2023-07-14 ENCOUNTER — TELEPHONE (OUTPATIENT)
Dept: INTERNAL MEDICINE CLINIC | Age: 53
End: 2023-07-14

## 2023-07-14 DIAGNOSIS — M06.00 SERONEGATIVE RHEUMATOID ARTHRITIS (HCC): ICD-10-CM

## 2023-07-14 RX ORDER — LEFLUNOMIDE 20 MG/1
20 TABLET ORAL DAILY
Qty: 30 TABLET | Refills: 0 | Status: SHIPPED | OUTPATIENT
Start: 2023-07-14

## 2023-07-14 RX ORDER — SULFASALAZINE 500 MG/1
500 TABLET ORAL 2 TIMES DAILY
Qty: 60 TABLET | Refills: 0 | Status: SHIPPED | OUTPATIENT
Start: 2023-07-14

## 2023-07-14 RX ORDER — FOLIC ACID 1 MG/1
1 TABLET ORAL DAILY
Qty: 30 TABLET | Refills: 0 | Status: SHIPPED | OUTPATIENT
Start: 2023-07-14

## 2023-07-20 ENCOUNTER — TELEPHONE (OUTPATIENT)
Dept: INTERNAL MEDICINE CLINIC | Age: 53
End: 2023-07-20

## 2023-07-20 ENCOUNTER — HOSPITAL ENCOUNTER (OUTPATIENT)
Dept: ONCOLOGY | Age: 53
Setting detail: INFUSION SERIES
Discharge: HOME | End: 2023-07-20
Payer: COMMERCIAL

## 2023-07-20 VITALS
RESPIRATION RATE: 16 BRPM | HEART RATE: 69 BPM | TEMPERATURE: 96.9 F | SYSTOLIC BLOOD PRESSURE: 117 MMHG | DIASTOLIC BLOOD PRESSURE: 70 MMHG

## 2023-07-20 DIAGNOSIS — R06.02 SOB (SHORTNESS OF BREATH): ICD-10-CM

## 2023-07-20 DIAGNOSIS — I50.32 CHRONIC DIASTOLIC CONGESTIVE HEART FAILURE (HCC): ICD-10-CM

## 2023-07-20 DIAGNOSIS — I10 ESSENTIAL HYPERTENSION: ICD-10-CM

## 2023-07-20 DIAGNOSIS — E87.1 HYPONATREMIA: ICD-10-CM

## 2023-07-20 DIAGNOSIS — I50.32 CHRONIC HEART FAILURE WITH PRESERVED EJECTION FRACTION (HCC): Primary | ICD-10-CM

## 2023-07-20 DIAGNOSIS — I89.0 LYMPHEDEMA OF UPPER EXTREMITY FOLLOWING LYMPHADENECTOMY: ICD-10-CM

## 2023-07-20 DIAGNOSIS — E89.89 LYMPHEDEMA OF UPPER EXTREMITY FOLLOWING LYMPHADENECTOMY: ICD-10-CM

## 2023-07-20 DIAGNOSIS — N18.4 CKD (CHRONIC KIDNEY DISEASE) STAGE 4, GFR 15-29 ML/MIN (HCC): ICD-10-CM

## 2023-07-20 DIAGNOSIS — M51.36 DDD (DEGENERATIVE DISC DISEASE), LUMBAR: ICD-10-CM

## 2023-07-20 LAB
ANION GAP SERPL CALCULATED.3IONS-SCNC: 11 MMOL/L (ref 3–16)
BUN SERPL-MCNC: 35 MG/DL (ref 7–20)
CALCIUM SERPL-MCNC: 9.1 MG/DL (ref 8.3–10.6)
CHLORIDE SERPL-SCNC: 96 MMOL/L (ref 99–110)
CO2 SERPL-SCNC: 34 MMOL/L (ref 21–32)
CREAT SERPL-MCNC: 1.2 MG/DL (ref 0.6–1.1)
GFR SERPLBLD CREATININE-BSD FMLA CKD-EPI: 54 ML/MIN/{1.73_M2}
GLUCOSE SERPL-MCNC: 254 MG/DL (ref 70–99)
NT-PROBNP SERPL-MCNC: 160 PG/ML (ref 0–124)
POTASSIUM SERPL-SCNC: 4.1 MMOL/L (ref 3.5–5.1)
SODIUM SERPL-SCNC: 141 MMOL/L (ref 136–145)

## 2023-07-20 PROCEDURE — 80048 BASIC METABOLIC PNL TOTAL CA: CPT

## 2023-07-20 PROCEDURE — 83880 ASSAY OF NATRIURETIC PEPTIDE: CPT

## 2023-07-20 PROCEDURE — 96374 THER/PROPH/DIAG INJ IV PUSH: CPT

## 2023-07-20 PROCEDURE — 2580000003 HC RX 258: Performed by: INTERNAL MEDICINE

## 2023-07-20 PROCEDURE — 99211 OFF/OP EST MAY X REQ PHY/QHP: CPT

## 2023-07-20 PROCEDURE — 6360000002 HC RX W HCPCS: Performed by: INTERNAL MEDICINE

## 2023-07-20 RX ORDER — FUROSEMIDE 10 MG/ML
120 INJECTION INTRAMUSCULAR; INTRAVENOUS ONCE
Start: 2023-07-27 | End: 2023-07-27

## 2023-07-20 RX ORDER — FUROSEMIDE 10 MG/ML
120 INJECTION INTRAMUSCULAR; INTRAVENOUS ONCE
Status: COMPLETED | OUTPATIENT
Start: 2023-07-20 | End: 2023-07-20

## 2023-07-20 RX ORDER — SODIUM CHLORIDE 0.9 % (FLUSH) 0.9 %
5-40 SYRINGE (ML) INJECTION ONCE
Status: COMPLETED | OUTPATIENT
Start: 2023-07-20 | End: 2023-07-20

## 2023-07-20 RX ORDER — SODIUM CHLORIDE 0.9 % (FLUSH) 0.9 %
5-40 SYRINGE (ML) INJECTION ONCE
Start: 2023-07-27 | End: 2023-07-27

## 2023-07-20 RX ADMIN — FUROSEMIDE 120 MG: 10 INJECTION, SOLUTION INTRAMUSCULAR; INTRAVENOUS at 14:16

## 2023-07-20 RX ADMIN — Medication 10 ML: at 14:16

## 2023-07-20 NOTE — TELEPHONE ENCOUNTER
Pt  calling requesting refill of Pregabalin    Last written 5/22/23  Last OV 7/10/23  Next OV 10/16/23  Last recommended OV NA     Please send to [address]

## 2023-07-20 NOTE — TELEPHONE ENCOUNTER
Pt   calling requesting refill of Pregabalin (5/22/23) and Hydrothor (1/5/23)    Last written see above  Last OV 7/10/23  Next OV 10/16/23  Last recommended OV NA     Please send to [phone]

## 2023-07-21 RX ORDER — PREGABALIN 75 MG/1
75 CAPSULE ORAL 2 TIMES DAILY
Qty: 60 CAPSULE | Refills: 1 | Status: CANCELLED | OUTPATIENT
Start: 2023-07-21 | End: 2023-09-19

## 2023-07-21 RX ORDER — PETROLATUM 42 G/100G
OINTMENT TOPICAL
Qty: 454 G | Refills: 5 | Status: SHIPPED | OUTPATIENT
Start: 2023-07-21

## 2023-07-21 RX ORDER — PREGABALIN 75 MG/1
75 CAPSULE ORAL 2 TIMES DAILY
Qty: 60 CAPSULE | Refills: 1 | Status: SHIPPED | OUTPATIENT
Start: 2023-07-21 | End: 2023-07-23 | Stop reason: SDUPTHER

## 2023-07-23 RX ORDER — PREGABALIN 75 MG/1
75 CAPSULE ORAL 2 TIMES DAILY
Qty: 60 CAPSULE | Refills: 0 | Status: SHIPPED | OUTPATIENT
Start: 2023-07-23 | End: 2023-09-21

## 2023-07-23 NOTE — TELEPHONE ENCOUNTER
Lyrica sent to pharmacy. This was previously prescribed by Dr. Pallavi Lenz. Now that she is active at her new office, future refills need to come from her.

## 2023-07-26 RX ORDER — ROSUVASTATIN CALCIUM 10 MG/1
10 TABLET, COATED ORAL NIGHTLY
Qty: 90 TABLET | Refills: 1 | Status: SHIPPED | OUTPATIENT
Start: 2023-07-26

## 2023-08-02 DIAGNOSIS — J45.30 MILD PERSISTENT ASTHMA WITHOUT COMPLICATION: ICD-10-CM

## 2023-08-03 ENCOUNTER — TELEPHONE (OUTPATIENT)
Dept: CARDIOLOGY CLINIC | Age: 53
End: 2023-08-03

## 2023-08-03 ENCOUNTER — HOSPITAL ENCOUNTER (OUTPATIENT)
Dept: ONCOLOGY | Age: 53
Setting detail: INFUSION SERIES
Discharge: HOME | End: 2023-08-03
Payer: COMMERCIAL

## 2023-08-03 ENCOUNTER — HOSPITAL ENCOUNTER (OUTPATIENT)
Dept: ULTRASOUND IMAGING | Age: 53
Discharge: HOME | End: 2023-08-03
Payer: COMMERCIAL

## 2023-08-03 VITALS
SYSTOLIC BLOOD PRESSURE: 99 MMHG | RESPIRATION RATE: 16 BRPM | HEART RATE: 72 BPM | TEMPERATURE: 96.8 F | DIASTOLIC BLOOD PRESSURE: 66 MMHG

## 2023-08-03 DIAGNOSIS — R92.8 ABNORMAL MAMMOGRAM: ICD-10-CM

## 2023-08-03 DIAGNOSIS — I50.32 CHRONIC DIASTOLIC CONGESTIVE HEART FAILURE (HCC): ICD-10-CM

## 2023-08-03 DIAGNOSIS — N18.4 CKD (CHRONIC KIDNEY DISEASE) STAGE 4, GFR 15-29 ML/MIN (HCC): ICD-10-CM

## 2023-08-03 DIAGNOSIS — I10 ESSENTIAL HYPERTENSION: ICD-10-CM

## 2023-08-03 DIAGNOSIS — E89.89 LYMPHEDEMA OF UPPER EXTREMITY FOLLOWING LYMPHADENECTOMY: ICD-10-CM

## 2023-08-03 DIAGNOSIS — I50.32 CHRONIC HEART FAILURE WITH PRESERVED EJECTION FRACTION (HCC): Primary | ICD-10-CM

## 2023-08-03 DIAGNOSIS — R06.02 SOB (SHORTNESS OF BREATH): ICD-10-CM

## 2023-08-03 DIAGNOSIS — E87.1 HYPONATREMIA: ICD-10-CM

## 2023-08-03 DIAGNOSIS — I89.0 LYMPHEDEMA OF UPPER EXTREMITY FOLLOWING LYMPHADENECTOMY: ICD-10-CM

## 2023-08-03 LAB
ANION GAP SERPL CALCULATED.3IONS-SCNC: 10 MMOL/L (ref 3–16)
BUN SERPL-MCNC: 42 MG/DL (ref 7–20)
CALCIUM SERPL-MCNC: 10 MG/DL (ref 8.3–10.6)
CHLORIDE SERPL-SCNC: 92 MMOL/L (ref 99–110)
CO2 SERPL-SCNC: 30 MMOL/L (ref 21–32)
CREAT SERPL-MCNC: 1.8 MG/DL (ref 0.6–1.1)
GFR SERPLBLD CREATININE-BSD FMLA CKD-EPI: 33 ML/MIN/{1.73_M2}
GLUCOSE SERPL-MCNC: 162 MG/DL (ref 70–99)
NT-PROBNP SERPL-MCNC: 86 PG/ML (ref 0–124)
POTASSIUM SERPL-SCNC: 3.7 MMOL/L (ref 3.5–5.1)
SODIUM SERPL-SCNC: 132 MMOL/L (ref 136–145)

## 2023-08-03 PROCEDURE — 83880 ASSAY OF NATRIURETIC PEPTIDE: CPT

## 2023-08-03 PROCEDURE — 80048 BASIC METABOLIC PNL TOTAL CA: CPT

## 2023-08-03 PROCEDURE — 99211 OFF/OP EST MAY X REQ PHY/QHP: CPT

## 2023-08-03 PROCEDURE — 96374 THER/PROPH/DIAG INJ IV PUSH: CPT

## 2023-08-03 PROCEDURE — 6360000002 HC RX W HCPCS: Performed by: INTERNAL MEDICINE

## 2023-08-03 PROCEDURE — 76642 ULTRASOUND BREAST LIMITED: CPT

## 2023-08-03 RX ORDER — SODIUM CHLORIDE 0.9 % (FLUSH) 0.9 %
5-40 SYRINGE (ML) INJECTION ONCE
Status: DISCONTINUED | OUTPATIENT
Start: 2023-08-03 | End: 2023-08-04 | Stop reason: HOSPADM

## 2023-08-03 RX ORDER — MONTELUKAST SODIUM 10 MG/1
TABLET ORAL
Qty: 30 TABLET | Refills: 0 | Status: SHIPPED | OUTPATIENT
Start: 2023-08-03

## 2023-08-03 RX ORDER — SODIUM CHLORIDE 0.9 % (FLUSH) 0.9 %
5-40 SYRINGE (ML) INJECTION ONCE
Start: 2023-08-10 | End: 2023-08-10

## 2023-08-03 RX ORDER — FUROSEMIDE 10 MG/ML
120 INJECTION INTRAMUSCULAR; INTRAVENOUS ONCE
Start: 2023-08-10 | End: 2023-08-10

## 2023-08-03 RX ORDER — FUROSEMIDE 10 MG/ML
120 INJECTION INTRAMUSCULAR; INTRAVENOUS ONCE
Status: COMPLETED | OUTPATIENT
Start: 2023-08-03 | End: 2023-08-03

## 2023-08-03 RX ADMIN — FUROSEMIDE 120 MG: 10 INJECTION, SOLUTION INTRAMUSCULAR; INTRAVENOUS at 14:11

## 2023-08-03 NOTE — TELEPHONE ENCOUNTER
----- Message from Osmin Barraza MD sent at 8/3/2023  3:13 PM EDT -----  Please call patient. Her kidneys are getting very dry and BNP is now normal.  So I want to reduce her torsemide.     Take 50 mg twice a day M,W,F and only 50 mg once a day on T, Th, S, S.    ARETHA

## 2023-08-04 ENCOUNTER — TELEPHONE (OUTPATIENT)
Dept: CARDIOLOGY CLINIC | Age: 53
End: 2023-08-04

## 2023-08-04 NOTE — TELEPHONE ENCOUNTER
Pt called to get the doses for the week day. Per Pt ARETHA has changed it. Please call to discuss.  Thank you

## 2023-08-04 NOTE — TELEPHONE ENCOUNTER
Spoke with Mr. Bubba Schwab, explained the following:   Take 50 mg twice a day M,W,F and only 50 mg once a day on T, Th, S, S.

## 2023-08-08 ENCOUNTER — TELEPHONE (OUTPATIENT)
Dept: ENDOCRINOLOGY | Age: 53
End: 2023-08-08

## 2023-08-09 RX ORDER — FOLIC ACID 1 MG/1
TABLET ORAL
Qty: 90 TABLET | Refills: 1 | Status: SHIPPED | OUTPATIENT
Start: 2023-08-09

## 2023-08-09 RX ORDER — LUBIPROSTONE 24 UG/1
CAPSULE ORAL
Qty: 180 CAPSULE | Refills: 1 | Status: SHIPPED | OUTPATIENT
Start: 2023-08-09

## 2023-08-17 ENCOUNTER — HOSPITAL ENCOUNTER (OUTPATIENT)
Dept: ONCOLOGY | Age: 53
Setting detail: INFUSION SERIES
Discharge: HOME | End: 2023-08-17
Payer: COMMERCIAL

## 2023-08-17 ENCOUNTER — TELEPHONE (OUTPATIENT)
Dept: CARDIOLOGY CLINIC | Age: 53
End: 2023-08-17

## 2023-08-17 VITALS — HEART RATE: 85 BPM | RESPIRATION RATE: 16 BRPM | SYSTOLIC BLOOD PRESSURE: 128 MMHG | DIASTOLIC BLOOD PRESSURE: 73 MMHG

## 2023-08-17 DIAGNOSIS — N18.4 CKD (CHRONIC KIDNEY DISEASE) STAGE 4, GFR 15-29 ML/MIN (HCC): ICD-10-CM

## 2023-08-17 DIAGNOSIS — I89.0 LYMPHEDEMA OF UPPER EXTREMITY FOLLOWING LYMPHADENECTOMY: ICD-10-CM

## 2023-08-17 DIAGNOSIS — I10 ESSENTIAL HYPERTENSION: ICD-10-CM

## 2023-08-17 DIAGNOSIS — E89.89 LYMPHEDEMA OF UPPER EXTREMITY FOLLOWING LYMPHADENECTOMY: ICD-10-CM

## 2023-08-17 DIAGNOSIS — I50.32 CHRONIC DIASTOLIC CONGESTIVE HEART FAILURE (HCC): ICD-10-CM

## 2023-08-17 DIAGNOSIS — R06.02 SOB (SHORTNESS OF BREATH): ICD-10-CM

## 2023-08-17 DIAGNOSIS — I50.32 CHRONIC HEART FAILURE WITH PRESERVED EJECTION FRACTION (HCC): Primary | ICD-10-CM

## 2023-08-17 DIAGNOSIS — E87.1 HYPONATREMIA: ICD-10-CM

## 2023-08-17 LAB
ANION GAP SERPL CALCULATED.3IONS-SCNC: 10 MMOL/L (ref 3–16)
BUN SERPL-MCNC: 23 MG/DL (ref 7–20)
CALCIUM SERPL-MCNC: 9.4 MG/DL (ref 8.3–10.6)
CHLORIDE SERPL-SCNC: 90 MMOL/L (ref 99–110)
CO2 SERPL-SCNC: 32 MMOL/L (ref 21–32)
CREAT SERPL-MCNC: 1.4 MG/DL (ref 0.6–1.1)
GFR SERPLBLD CREATININE-BSD FMLA CKD-EPI: 45 ML/MIN/{1.73_M2}
GLUCOSE SERPL-MCNC: 197 MG/DL (ref 70–99)
NT-PROBNP SERPL-MCNC: 278 PG/ML (ref 0–124)
POTASSIUM SERPL-SCNC: 4.9 MMOL/L (ref 3.5–5.1)
SODIUM SERPL-SCNC: 132 MMOL/L (ref 136–145)

## 2023-08-17 PROCEDURE — 96374 THER/PROPH/DIAG INJ IV PUSH: CPT

## 2023-08-17 PROCEDURE — 2580000003 HC RX 258: Performed by: INTERNAL MEDICINE

## 2023-08-17 PROCEDURE — 6360000002 HC RX W HCPCS: Performed by: INTERNAL MEDICINE

## 2023-08-17 PROCEDURE — 80048 BASIC METABOLIC PNL TOTAL CA: CPT

## 2023-08-17 PROCEDURE — 99211 OFF/OP EST MAY X REQ PHY/QHP: CPT

## 2023-08-17 PROCEDURE — 83880 ASSAY OF NATRIURETIC PEPTIDE: CPT

## 2023-08-17 RX ORDER — FUROSEMIDE 10 MG/ML
120 INJECTION INTRAMUSCULAR; INTRAVENOUS ONCE
Start: 2023-08-24 | End: 2023-08-24

## 2023-08-17 RX ORDER — SODIUM CHLORIDE 0.9 % (FLUSH) 0.9 %
5-40 SYRINGE (ML) INJECTION ONCE
Start: 2023-08-24 | End: 2023-08-24

## 2023-08-17 RX ORDER — FUROSEMIDE 10 MG/ML
120 INJECTION INTRAMUSCULAR; INTRAVENOUS ONCE
Status: COMPLETED | OUTPATIENT
Start: 2023-08-17 | End: 2023-08-17

## 2023-08-17 RX ORDER — SODIUM CHLORIDE 0.9 % (FLUSH) 0.9 %
5-40 SYRINGE (ML) INJECTION ONCE
Status: COMPLETED | OUTPATIENT
Start: 2023-08-17 | End: 2023-08-17

## 2023-08-17 RX ADMIN — FUROSEMIDE 120 MG: 10 INJECTION, SOLUTION INTRAMUSCULAR; INTRAVENOUS at 14:00

## 2023-08-17 RX ADMIN — Medication 10 ML: at 14:01

## 2023-08-17 NOTE — PROGRESS NOTES
Pt ambulatory to infusion center for Lasix IVP. IV access obtained. Labs drawn. IVP Lasix administered; 120 mg IVP given over 2 minutes. IV removed.  Pt/spouse denied need for printed AVS. Pt to return in 2 weeks for same treatment

## 2023-08-17 NOTE — TELEPHONE ENCOUNTER
----- Message from Shawna Palencia MD sent at 8/17/2023  4:41 PM EDT -----  Please call  and let him know labs look much better. Kidneys better. Continue current diuretic regimen.   ARETHA

## 2023-08-20 DIAGNOSIS — E03.2 HYPOTHYROIDISM DUE TO MEDICATION: ICD-10-CM

## 2023-08-20 DIAGNOSIS — M06.00 SERONEGATIVE RHEUMATOID ARTHRITIS (HCC): ICD-10-CM

## 2023-08-21 RX ORDER — LEVOTHYROXINE SODIUM 0.15 MG/1
TABLET ORAL
Qty: 90 TABLET | Refills: 0 | Status: SHIPPED | OUTPATIENT
Start: 2023-08-21

## 2023-08-21 RX ORDER — LEFLUNOMIDE 20 MG/1
TABLET ORAL
Qty: 30 TABLET | Refills: 0 | Status: SHIPPED | OUTPATIENT
Start: 2023-08-21

## 2023-08-21 RX ORDER — SULFASALAZINE 500 MG/1
500 TABLET ORAL 2 TIMES DAILY
Qty: 60 TABLET | Refills: 0 | Status: SHIPPED | OUTPATIENT
Start: 2023-08-21

## 2023-08-29 ENCOUNTER — OFFICE VISIT (OUTPATIENT)
Dept: ENDOCRINOLOGY | Age: 53
End: 2023-08-29
Payer: COMMERCIAL

## 2023-08-29 VITALS
RESPIRATION RATE: 14 BRPM | BODY MASS INDEX: 32.78 KG/M2 | HEIGHT: 64 IN | SYSTOLIC BLOOD PRESSURE: 107 MMHG | DIASTOLIC BLOOD PRESSURE: 63 MMHG | WEIGHT: 192 LBS | HEART RATE: 73 BPM | TEMPERATURE: 98 F

## 2023-08-29 DIAGNOSIS — Z79.4 TYPE 2 DIABETES MELLITUS WITH OTHER CIRCULATORY COMPLICATION, WITH LONG-TERM CURRENT USE OF INSULIN (HCC): Primary | ICD-10-CM

## 2023-08-29 DIAGNOSIS — E03.2 HYPOTHYROIDISM DUE TO MEDICATION: ICD-10-CM

## 2023-08-29 DIAGNOSIS — E11.59 TYPE 2 DIABETES MELLITUS WITH OTHER CIRCULATORY COMPLICATION, WITH LONG-TERM CURRENT USE OF INSULIN (HCC): Primary | ICD-10-CM

## 2023-08-29 DIAGNOSIS — I25.10 CORONARY ARTERY DISEASE INVOLVING NATIVE CORONARY ARTERY OF NATIVE HEART WITHOUT ANGINA PECTORIS: ICD-10-CM

## 2023-08-29 DIAGNOSIS — E78.2 MIXED HYPERLIPIDEMIA: ICD-10-CM

## 2023-08-29 DIAGNOSIS — I10 ESSENTIAL HYPERTENSION: ICD-10-CM

## 2023-08-29 LAB — HBA1C MFR BLD: 8.3 %

## 2023-08-29 PROCEDURE — 2022F DILAT RTA XM EVC RTNOPTHY: CPT | Performed by: INTERNAL MEDICINE

## 2023-08-29 PROCEDURE — 3052F HG A1C>EQUAL 8.0%<EQUAL 9.0%: CPT | Performed by: INTERNAL MEDICINE

## 2023-08-29 PROCEDURE — 99214 OFFICE O/P EST MOD 30 MIN: CPT | Performed by: INTERNAL MEDICINE

## 2023-08-29 PROCEDURE — 83036 HEMOGLOBIN GLYCOSYLATED A1C: CPT | Performed by: INTERNAL MEDICINE

## 2023-08-29 PROCEDURE — 3017F COLORECTAL CA SCREEN DOC REV: CPT | Performed by: INTERNAL MEDICINE

## 2023-08-29 PROCEDURE — 3078F DIAST BP <80 MM HG: CPT | Performed by: INTERNAL MEDICINE

## 2023-08-29 PROCEDURE — G8427 DOCREV CUR MEDS BY ELIG CLIN: HCPCS | Performed by: INTERNAL MEDICINE

## 2023-08-29 PROCEDURE — 3074F SYST BP LT 130 MM HG: CPT | Performed by: INTERNAL MEDICINE

## 2023-08-29 PROCEDURE — 1036F TOBACCO NON-USER: CPT | Performed by: INTERNAL MEDICINE

## 2023-08-29 PROCEDURE — G8417 CALC BMI ABV UP PARAM F/U: HCPCS | Performed by: INTERNAL MEDICINE

## 2023-08-29 RX ORDER — BLOOD SUGAR DIAGNOSTIC
STRIP MISCELLANEOUS
Qty: 200 STRIP | Refills: 5 | Status: SHIPPED | OUTPATIENT
Start: 2023-08-29

## 2023-08-29 RX ORDER — DULAGLUTIDE 1.5 MG/.5ML
1.5 INJECTION, SOLUTION SUBCUTANEOUS WEEKLY
Qty: 4 ADJUSTABLE DOSE PRE-FILLED PEN SYRINGE | Refills: 4 | Status: SHIPPED | OUTPATIENT
Start: 2023-08-29

## 2023-08-29 RX ORDER — INSULIN DEGLUDEC 200 U/ML
INJECTION, SOLUTION SUBCUTANEOUS
Qty: 54 ML | Refills: 3 | Status: SHIPPED | OUTPATIENT
Start: 2023-08-29

## 2023-08-29 RX ORDER — INSULIN ASPART 100 [IU]/ML
INJECTION, SOLUTION INTRAVENOUS; SUBCUTANEOUS
Qty: 60 ML | Refills: 3 | Status: SHIPPED | OUTPATIENT
Start: 2023-08-29

## 2023-08-29 RX ORDER — LEVOTHYROXINE SODIUM 0.15 MG/1
150 TABLET ORAL
Qty: 90 TABLET | Refills: 1 | Status: SHIPPED | OUTPATIENT
Start: 2023-08-29

## 2023-08-29 RX ORDER — DULAGLUTIDE 0.75 MG/.5ML
INJECTION, SOLUTION SUBCUTANEOUS
Qty: 2 ML | Refills: 3 | Status: CANCELLED | OUTPATIENT
Start: 2023-08-29

## 2023-08-29 RX ORDER — FLURBIPROFEN SODIUM 0.3 MG/ML
SOLUTION/ DROPS OPHTHALMIC
Qty: 200 EACH | Refills: 5 | Status: SHIPPED | OUTPATIENT
Start: 2023-08-29

## 2023-08-29 NOTE — PROGRESS NOTES
Clara Reddy is a 46 y.o. female is seen for the management of uncontrolled  type 2 diabetes and hypothyroidism . Patient has complex medical problems including coronary artery disease , DOMENIC, CHF, breast cancer, fibromyalgia, hyperlipidemia, hypertension, obesity, asthma and depression  T2DM which is uncontrolled and is complicated with nephropathy and DOMENIC . She got diabetic education in the past and at one point she was on an insulin pump. She still has hypoglycemia awareness tends to watch her diet somewhat but her depression hinders in managing her diabetes. Most of her medications are managed by her . Audrey Tillamn was diagnosed with hypothyroidism in march 2010 and has been on Lt4 since then   she had GDM with her 2 kids ---she has been on insulin for years      Corw also has hypertension , GERD , hyperlipidemia ,she also has breast cancer s/p mastectomy and radiation and chemo &is on Tamixifen   she also has Asthma she is on Cpap for sleep apnea and wears Oxygen   She follows with a rheumatologist and is on hydroxychloroquine, previously was on methotrexate    INTERIM:    Diabetes  She presents for her follow-up diabetic visit. She has type 2 diabetes mellitus. No MedicAlert identification noted. The initial diagnosis of diabetes was made 17 years ago. Her disease course has been stable. Hypoglycemia symptoms include nervousness/anxiousness and sweats. Pertinent negatives for hypoglycemia include no dizziness or headaches. Associated symptoms include fatigue and weakness. There are no hypoglycemic complications. Symptoms are stable. Diabetic complications include autonomic neuropathy, heart disease and peripheral neuropathy. Risk factors for coronary artery disease include diabetes mellitus, post-menopausal, hypertension and dyslipidemia. Current diabetic treatment includes intensive insulin program. She is compliant with treatment some of the time. Her weight is stable.  She is following a

## 2023-08-31 ENCOUNTER — HOSPITAL ENCOUNTER (OUTPATIENT)
Dept: ONCOLOGY | Age: 53
Setting detail: INFUSION SERIES
Discharge: HOME | End: 2023-08-31
Payer: COMMERCIAL

## 2023-08-31 VITALS
DIASTOLIC BLOOD PRESSURE: 61 MMHG | SYSTOLIC BLOOD PRESSURE: 107 MMHG | HEART RATE: 68 BPM | RESPIRATION RATE: 16 BRPM | TEMPERATURE: 98.2 F

## 2023-08-31 DIAGNOSIS — I10 ESSENTIAL HYPERTENSION: ICD-10-CM

## 2023-08-31 DIAGNOSIS — R06.02 SOB (SHORTNESS OF BREATH): ICD-10-CM

## 2023-08-31 DIAGNOSIS — E89.89 LYMPHEDEMA OF UPPER EXTREMITY FOLLOWING LYMPHADENECTOMY: ICD-10-CM

## 2023-08-31 DIAGNOSIS — I50.32 CHRONIC DIASTOLIC CONGESTIVE HEART FAILURE (HCC): ICD-10-CM

## 2023-08-31 DIAGNOSIS — E87.1 HYPONATREMIA: ICD-10-CM

## 2023-08-31 DIAGNOSIS — I89.0 LYMPHEDEMA OF UPPER EXTREMITY FOLLOWING LYMPHADENECTOMY: ICD-10-CM

## 2023-08-31 DIAGNOSIS — I50.32 CHRONIC HEART FAILURE WITH PRESERVED EJECTION FRACTION (HCC): Primary | ICD-10-CM

## 2023-08-31 DIAGNOSIS — N18.4 CKD (CHRONIC KIDNEY DISEASE) STAGE 4, GFR 15-29 ML/MIN (HCC): ICD-10-CM

## 2023-08-31 LAB
ANION GAP SERPL CALCULATED.3IONS-SCNC: 9 MMOL/L (ref 3–16)
BUN SERPL-MCNC: 43 MG/DL (ref 7–20)
CALCIUM SERPL-MCNC: 9.4 MG/DL (ref 8.3–10.6)
CHLORIDE SERPL-SCNC: 93 MMOL/L (ref 99–110)
CO2 SERPL-SCNC: 34 MMOL/L (ref 21–32)
CREAT SERPL-MCNC: 1.6 MG/DL (ref 0.6–1.1)
GFR SERPLBLD CREATININE-BSD FMLA CKD-EPI: 38 ML/MIN/{1.73_M2}
GLUCOSE SERPL-MCNC: 166 MG/DL (ref 70–99)
NT-PROBNP SERPL-MCNC: 113 PG/ML (ref 0–124)
POTASSIUM SERPL-SCNC: 4.4 MMOL/L (ref 3.5–5.1)
SODIUM SERPL-SCNC: 136 MMOL/L (ref 136–145)

## 2023-08-31 PROCEDURE — 6360000002 HC RX W HCPCS: Performed by: INTERNAL MEDICINE

## 2023-08-31 PROCEDURE — 96374 THER/PROPH/DIAG INJ IV PUSH: CPT

## 2023-08-31 PROCEDURE — 99211 OFF/OP EST MAY X REQ PHY/QHP: CPT

## 2023-08-31 PROCEDURE — 83880 ASSAY OF NATRIURETIC PEPTIDE: CPT

## 2023-08-31 PROCEDURE — 2580000003 HC RX 258: Performed by: INTERNAL MEDICINE

## 2023-08-31 PROCEDURE — 80048 BASIC METABOLIC PNL TOTAL CA: CPT

## 2023-08-31 RX ORDER — SODIUM CHLORIDE 0.9 % (FLUSH) 0.9 %
5-40 SYRINGE (ML) INJECTION ONCE
Start: 2023-09-07 | End: 2023-09-07

## 2023-08-31 RX ORDER — FUROSEMIDE 10 MG/ML
120 INJECTION INTRAMUSCULAR; INTRAVENOUS ONCE
Status: COMPLETED | OUTPATIENT
Start: 2023-08-31 | End: 2023-08-31

## 2023-08-31 RX ORDER — FUROSEMIDE 10 MG/ML
120 INJECTION INTRAMUSCULAR; INTRAVENOUS ONCE
Start: 2023-09-07 | End: 2023-09-07

## 2023-08-31 RX ORDER — SODIUM CHLORIDE 0.9 % (FLUSH) 0.9 %
5-40 SYRINGE (ML) INJECTION ONCE
Status: COMPLETED | OUTPATIENT
Start: 2023-08-31 | End: 2023-08-31

## 2023-08-31 RX ADMIN — Medication 10 ML: at 14:26

## 2023-08-31 RX ADMIN — FUROSEMIDE 120 MG: 10 INJECTION, SOLUTION INTRAMUSCULAR; INTRAVENOUS at 14:26

## 2023-09-01 ENCOUNTER — TELEPHONE (OUTPATIENT)
Dept: CARDIOLOGY CLINIC | Age: 53
End: 2023-09-01

## 2023-09-01 NOTE — TELEPHONE ENCOUNTER
----- Message from Mi Dinh MD sent at 8/31/2023  5:10 PM EDT -----  Call patient's . Labs look good. Bnp is low despite lowering torsemide. Make sure she is staying on lower dose of torsemide.   ARETHA

## 2023-09-06 ENCOUNTER — TELEPHONE (OUTPATIENT)
Dept: INTERNAL MEDICINE CLINIC | Age: 53
End: 2023-09-06

## 2023-09-06 DIAGNOSIS — M51.36 DDD (DEGENERATIVE DISC DISEASE), LUMBAR: ICD-10-CM

## 2023-09-06 RX ORDER — PREGABALIN 75 MG/1
75 CAPSULE ORAL 2 TIMES DAILY
Qty: 60 CAPSULE | Refills: 0 | Status: SHIPPED | OUTPATIENT
Start: 2023-09-06 | End: 2023-11-05

## 2023-09-06 NOTE — TELEPHONE ENCOUNTER
Pt   calling requesting refill of Pregablin    Last written 7/23/23   Doesn't see Dr Jessenia Aguila until next month  Last OV 7/10/23  Next OV 10/16/23  Last recommended OV NA     Please send to [facility] ([facility]

## 2023-09-13 DIAGNOSIS — J45.30 MILD PERSISTENT ASTHMA WITHOUT COMPLICATION: ICD-10-CM

## 2023-09-13 RX ORDER — MONTELUKAST SODIUM 10 MG/1
10 TABLET ORAL
Qty: 30 TABLET | Refills: 0 | Status: SHIPPED | OUTPATIENT
Start: 2023-09-13

## 2023-09-14 ENCOUNTER — HOSPITAL ENCOUNTER (OUTPATIENT)
Dept: ONCOLOGY | Age: 53
Setting detail: INFUSION SERIES
Discharge: HOME | End: 2023-09-14
Payer: COMMERCIAL

## 2023-09-14 VITALS
RESPIRATION RATE: 16 BRPM | SYSTOLIC BLOOD PRESSURE: 116 MMHG | TEMPERATURE: 96.6 F | HEART RATE: 68 BPM | DIASTOLIC BLOOD PRESSURE: 69 MMHG

## 2023-09-14 DIAGNOSIS — E03.8 HYPOTHYROIDISM DUE TO HASHIMOTO'S THYROIDITIS: ICD-10-CM

## 2023-09-14 DIAGNOSIS — E87.1 HYPONATREMIA: ICD-10-CM

## 2023-09-14 DIAGNOSIS — Z79.899 MEDICATION MANAGEMENT: ICD-10-CM

## 2023-09-14 DIAGNOSIS — I50.32 CHRONIC DIASTOLIC CONGESTIVE HEART FAILURE (HCC): ICD-10-CM

## 2023-09-14 DIAGNOSIS — E11.9 TYPE 2 DIABETES MELLITUS WITHOUT COMPLICATION, WITH LONG-TERM CURRENT USE OF INSULIN (HCC): ICD-10-CM

## 2023-09-14 DIAGNOSIS — N18.4 CKD (CHRONIC KIDNEY DISEASE) STAGE 4, GFR 15-29 ML/MIN (HCC): ICD-10-CM

## 2023-09-14 DIAGNOSIS — I89.0 LYMPHEDEMA OF UPPER EXTREMITY FOLLOWING LYMPHADENECTOMY: ICD-10-CM

## 2023-09-14 DIAGNOSIS — I25.10 CORONARY ARTERY DISEASE INVOLVING NATIVE CORONARY ARTERY OF NATIVE HEART WITHOUT ANGINA PECTORIS: Chronic | ICD-10-CM

## 2023-09-14 DIAGNOSIS — E06.3 HYPOTHYROIDISM DUE TO HASHIMOTO'S THYROIDITIS: ICD-10-CM

## 2023-09-14 DIAGNOSIS — R06.02 SOB (SHORTNESS OF BREATH): ICD-10-CM

## 2023-09-14 DIAGNOSIS — I10 ESSENTIAL HYPERTENSION: ICD-10-CM

## 2023-09-14 DIAGNOSIS — E89.89 LYMPHEDEMA OF UPPER EXTREMITY FOLLOWING LYMPHADENECTOMY: ICD-10-CM

## 2023-09-14 DIAGNOSIS — Z79.4 TYPE 2 DIABETES MELLITUS WITHOUT COMPLICATION, WITH LONG-TERM CURRENT USE OF INSULIN (HCC): ICD-10-CM

## 2023-09-14 DIAGNOSIS — I50.32 CHRONIC HEART FAILURE WITH PRESERVED EJECTION FRACTION (HCC): Primary | ICD-10-CM

## 2023-09-14 DIAGNOSIS — K14.6 TONGUE PAIN: ICD-10-CM

## 2023-09-14 LAB
ALBUMIN SERPL-MCNC: 3.9 G/DL (ref 3.4–5)
ALBUMIN/GLOB SERPL: 1 {RATIO} (ref 1.1–2.2)
ALP SERPL-CCNC: 95 U/L (ref 40–129)
ALT SERPL-CCNC: 9 U/L (ref 10–40)
ANION GAP SERPL CALCULATED.3IONS-SCNC: 10 MMOL/L (ref 3–16)
AST SERPL-CCNC: 20 U/L (ref 15–37)
BILIRUB SERPL-MCNC: <0.2 MG/DL (ref 0–1)
BUN SERPL-MCNC: 39 MG/DL (ref 7–20)
CALCIUM SERPL-MCNC: 9.5 MG/DL (ref 8.3–10.6)
CHLORIDE SERPL-SCNC: 92 MMOL/L (ref 99–110)
CHOLEST SERPL-MCNC: 170 MG/DL (ref 0–199)
CO2 SERPL-SCNC: 33 MMOL/L (ref 21–32)
CREAT SERPL-MCNC: 1.8 MG/DL (ref 0.6–1.1)
CREAT UR-MCNC: 56.2 MG/DL (ref 28–259)
FOLATE SERPL-MCNC: >20 NG/ML (ref 4.78–24.2)
GFR SERPLBLD CREATININE-BSD FMLA CKD-EPI: 33 ML/MIN/{1.73_M2}
GLUCOSE SERPL-MCNC: 225 MG/DL (ref 70–99)
HDLC SERPL-MCNC: 30 MG/DL (ref 40–60)
LDLC SERPL CALC-MCNC: 88 MG/DL
MICROALBUMIN UR DL<=1MG/L-MCNC: 3.1 MG/DL
MICROALBUMIN/CREAT UR: 55.2 MG/G (ref 0–30)
NT-PROBNP SERPL-MCNC: 85 PG/ML (ref 0–124)
POTASSIUM SERPL-SCNC: 4.4 MMOL/L (ref 3.5–5.1)
PROT SERPL-MCNC: 7.7 G/DL (ref 6.4–8.2)
SODIUM SERPL-SCNC: 135 MMOL/L (ref 136–145)
TRIGL SERPL-MCNC: 258 MG/DL (ref 0–150)
VIT B12 SERPL-MCNC: 433 PG/ML (ref 211–911)
VLDLC SERPL CALC-MCNC: 52 MG/DL

## 2023-09-14 PROCEDURE — 82043 UR ALBUMIN QUANTITATIVE: CPT

## 2023-09-14 PROCEDURE — 2580000003 HC RX 258: Performed by: INTERNAL MEDICINE

## 2023-09-14 PROCEDURE — 83880 ASSAY OF NATRIURETIC PEPTIDE: CPT

## 2023-09-14 PROCEDURE — 80053 COMPREHEN METABOLIC PANEL: CPT

## 2023-09-14 PROCEDURE — 82607 VITAMIN B-12: CPT

## 2023-09-14 PROCEDURE — 80061 LIPID PANEL: CPT

## 2023-09-14 PROCEDURE — 6360000002 HC RX W HCPCS: Performed by: INTERNAL MEDICINE

## 2023-09-14 PROCEDURE — 82570 ASSAY OF URINE CREATININE: CPT

## 2023-09-14 PROCEDURE — 83036 HEMOGLOBIN GLYCOSYLATED A1C: CPT

## 2023-09-14 PROCEDURE — 96374 THER/PROPH/DIAG INJ IV PUSH: CPT

## 2023-09-14 PROCEDURE — 99211 OFF/OP EST MAY X REQ PHY/QHP: CPT

## 2023-09-14 PROCEDURE — 82746 ASSAY OF FOLIC ACID SERUM: CPT

## 2023-09-14 RX ORDER — SODIUM CHLORIDE 0.9 % (FLUSH) 0.9 %
5-40 SYRINGE (ML) INJECTION ONCE
Start: 2023-09-21 | End: 2023-09-21

## 2023-09-14 RX ORDER — FUROSEMIDE 10 MG/ML
120 INJECTION INTRAMUSCULAR; INTRAVENOUS ONCE
Status: COMPLETED | OUTPATIENT
Start: 2023-09-14 | End: 2023-09-14

## 2023-09-14 RX ORDER — SODIUM CHLORIDE 0.9 % (FLUSH) 0.9 %
5-40 SYRINGE (ML) INJECTION ONCE
Status: COMPLETED | OUTPATIENT
Start: 2023-09-14 | End: 2023-09-14

## 2023-09-14 RX ORDER — FUROSEMIDE 10 MG/ML
120 INJECTION INTRAMUSCULAR; INTRAVENOUS ONCE
Start: 2023-09-21 | End: 2023-09-21

## 2023-09-14 RX ADMIN — FUROSEMIDE 120 MG: 10 INJECTION, SOLUTION INTRAMUSCULAR; INTRAVENOUS at 14:05

## 2023-09-14 RX ADMIN — Medication 10 ML: at 14:06

## 2023-09-14 NOTE — PROGRESS NOTES
Pt ambulatory to infusion center for Lasix IVP. IV access obtained. Labs drawn, additional labs drawn for Dr. Yinka Kennedy per patient request.  Patient has bruise to right eye, states she fell at home from dizziness and her  Is aware. . IVP Lasix administered; 120 mg IVP given over 2 minutes. IV removed. Pt/spouse denied need for printed AVS. Pt to return in 2 weeks for same treatment.

## 2023-09-15 LAB
EST. AVERAGE GLUCOSE BLD GHB EST-MCNC: 182.9 MG/DL
HBA1C MFR BLD: 8 %

## 2023-09-18 ENCOUNTER — OFFICE VISIT (OUTPATIENT)
Dept: PULMONOLOGY | Age: 53
End: 2023-09-18
Payer: COMMERCIAL

## 2023-09-18 VITALS — WEIGHT: 187.4 LBS | OXYGEN SATURATION: 94 % | HEART RATE: 88 BPM | BODY MASS INDEX: 32.17 KG/M2

## 2023-09-18 DIAGNOSIS — J96.11 CHRONIC RESPIRATORY FAILURE WITH HYPOXIA (HCC): ICD-10-CM

## 2023-09-18 DIAGNOSIS — J45.30 MILD PERSISTENT ASTHMA WITHOUT COMPLICATION: ICD-10-CM

## 2023-09-18 DIAGNOSIS — G47.33 OSA (OBSTRUCTIVE SLEEP APNEA): Primary | ICD-10-CM

## 2023-09-18 PROCEDURE — 99214 OFFICE O/P EST MOD 30 MIN: CPT | Performed by: INTERNAL MEDICINE

## 2023-09-18 PROCEDURE — G8417 CALC BMI ABV UP PARAM F/U: HCPCS | Performed by: INTERNAL MEDICINE

## 2023-09-18 PROCEDURE — G8427 DOCREV CUR MEDS BY ELIG CLIN: HCPCS | Performed by: INTERNAL MEDICINE

## 2023-09-18 PROCEDURE — 3017F COLORECTAL CA SCREEN DOC REV: CPT | Performed by: INTERNAL MEDICINE

## 2023-09-18 PROCEDURE — 1036F TOBACCO NON-USER: CPT | Performed by: INTERNAL MEDICINE

## 2023-09-18 NOTE — PROGRESS NOTES
PULMONARY OFFICE FOLLOW UP NOTE    REASON FOR VISIT:   Chief Complaint   Patient presents with    Asthma       DATE OF VISIT: 9/18/2023    HISTORY OF PRESENT ILLNESS: 46y.o. year old female  is here for follow-up of asthma, chronic hypoxic respiratory failure on home oxygen at 2 L/min, heart failure with preserved ejection fraction, obstructive sleep apnea not on CPAP/ BIPAP. she has PMH of history of breast cancer, depression, hypothyroidism, diabetes mellitus type 2, seronegative rheumatoid arthritis on leflunomide. Patient denies any increased shortness of breath. She denies any cough or wheezing or chest pain or hemoptysis. Patient's bronchodilator regimen consist of Dulera 200/5, 2 puffs twice daily. She also uses albuterol inhaler and albuterol nebs as needed. She also takes Singulair 10 mg daily. Patient underwent BiPAP titration study in December 2020 and BiPAP 10/7 was prescribed. Patient is not using BiPAP. She stated that she cannot tolerate BiPAP. She does not have the machine. Patient was diagnosed with obstructive sleep apnea many years ago. She had 2 sleep studies, one 10 years ago and other close to 5 years ago. Her last sleep study was performed at [facility]. Her  states that she had two CPAP machines at home. She could not get used to the CPAP and stopped wearing them. She only wears oxygen to sleep at night. She underwent repeat baseline sleep study on 11/9/2020 that showed mild obstructive sleep apnea with AHI 5.8. However, patient did not sleep very well during the sleep study and did not get enough REM sleep. She was saturating less than 90% 404 minutes. Because of patient's cardiovascular risk factors, CPAP titration study was recommended. She underwent CPAP titration study on 12/17/2028 and CPAP could not control LUIS. BiPAP 12/7 was prescribed.          Diagnostic test reviewed:  I reviewed the chest x-ray from 3/27/2020 and my

## 2023-09-25 LAB
ALT SERPL-CCNC: 11 IU/L (ref 10–60)
AST SERPL-CCNC: 13 IU/L (ref 10–40)
BASOPHILS ABSOLUTE: 0.1 THOU/MCL (ref 0–0.2)
BASOPHILS ABSOLUTE: 1 %
CREAT SERPL-MCNC: 1.3 MG/DL (ref 0.6–1.2)
EGFR (CKD-EPI): 49 ML/MIN/1.73 M2
EOSINOPHILS ABSOLUTE: 0.1 THOU/MCL (ref 0.03–0.45)
EOSINOPHILS RELATIVE PERCENT: 2 %
HCT VFR BLD CALC: 36.9 % (ref 36–46)
HEMOGLOBIN: 11.8 G/DL (ref 12–15.2)
LYMPHOCYTES ABSOLUTE: 2.3 THOU/MCL (ref 1–4)
LYMPHOCYTES RELATIVE PERCENT: 27 %
MCH RBC QN AUTO: 27.4 PG (ref 27–33)
MCHC RBC AUTO-ENTMCNC: 31.9 G/DL (ref 32–36)
MCV RBC AUTO: 86 FL (ref 82–97)
MONOCYTES # BLD: 6 %
MONOCYTES ABSOLUTE: 0.5 THOU/MCL (ref 0.2–0.9)
NEUTROPHILS ABSOLUTE: 5.4 THOU/MCL (ref 1.8–7.7)
PDW BLD-RTO: 14.1 % (ref 12.3–17)
PLATELET # BLD: 224 THOU/MCL (ref 140–375)
PMV BLD AUTO: 9.6 FL (ref 7.4–11.5)
RBC # BLD: 4.29 MIL/MCL (ref 3.8–5.2)
SEG NEUTROPHILS: 64 %
URIC ACID, SERUM: 7.3 MG/DL (ref 2.3–6.6)
WBC # BLD: 8.4 THOU/MCL (ref 3.6–10.5)

## 2023-09-27 DIAGNOSIS — N28.9 RENAL INSUFFICIENCY: Chronic | ICD-10-CM

## 2023-09-27 DIAGNOSIS — R06.02 SOB (SHORTNESS OF BREATH): Chronic | ICD-10-CM

## 2023-09-27 DIAGNOSIS — I50.22 SYSTOLIC CHF, CHRONIC (HCC): ICD-10-CM

## 2023-09-27 DIAGNOSIS — I25.10 CORONARY ARTERY DISEASE INVOLVING NATIVE CORONARY ARTERY OF NATIVE HEART WITHOUT ANGINA PECTORIS: Chronic | ICD-10-CM

## 2023-09-27 DIAGNOSIS — I10 ESSENTIAL HYPERTENSION: ICD-10-CM

## 2023-09-27 DIAGNOSIS — I50.30 (HFPEF) HEART FAILURE WITH PRESERVED EJECTION FRACTION (HCC): Chronic | ICD-10-CM

## 2023-09-27 DIAGNOSIS — J45.30 MILD PERSISTENT ASTHMA WITHOUT COMPLICATION: ICD-10-CM

## 2023-09-27 DIAGNOSIS — E55.9 VITAMIN D DEFICIENCY: ICD-10-CM

## 2023-09-27 RX ORDER — METOLAZONE 2.5 MG/1
TABLET ORAL
Qty: 24 TABLET | Refills: 0 | OUTPATIENT
Start: 2023-09-27

## 2023-09-27 RX ORDER — MONTELUKAST SODIUM 10 MG/1
10 TABLET ORAL
Qty: 30 TABLET | Refills: 3 | Status: SHIPPED | OUTPATIENT
Start: 2023-09-27

## 2023-09-27 RX ORDER — CHOLECALCIFEROL (VITAMIN D3) 25 MCG
TABLET ORAL
Qty: 90 TABLET | Refills: 5 | Status: SHIPPED | OUTPATIENT
Start: 2023-09-27

## 2023-09-27 NOTE — TELEPHONE ENCOUNTER
Last appointment:  9/18/2023    Next appointment:  3/6/2024    Last refill:     montelukast (SINGULAIR) 10 MG tablet [[id number]]     Order Details  Dose: 10 mg Route: Oral Frequency: --   Dispense Quantity: 30 tablet Refills: 0          Sig: TAKE 1 TABLET BY MOUTH AT NIGHT

## 2023-09-28 ENCOUNTER — HOSPITAL ENCOUNTER (OUTPATIENT)
Dept: ONCOLOGY | Age: 53
Setting detail: INFUSION SERIES
Discharge: HOME | End: 2023-09-28
Payer: COMMERCIAL

## 2023-09-28 VITALS
OXYGEN SATURATION: 94 % | SYSTOLIC BLOOD PRESSURE: 164 MMHG | TEMPERATURE: 97.6 F | RESPIRATION RATE: 16 BRPM | DIASTOLIC BLOOD PRESSURE: 85 MMHG | HEART RATE: 86 BPM

## 2023-09-28 DIAGNOSIS — R06.02 SOB (SHORTNESS OF BREATH): ICD-10-CM

## 2023-09-28 DIAGNOSIS — I89.0 LYMPHEDEMA OF UPPER EXTREMITY FOLLOWING LYMPHADENECTOMY: ICD-10-CM

## 2023-09-28 DIAGNOSIS — N18.4 CKD (CHRONIC KIDNEY DISEASE) STAGE 4, GFR 15-29 ML/MIN (HCC): ICD-10-CM

## 2023-09-28 DIAGNOSIS — E87.1 HYPONATREMIA: ICD-10-CM

## 2023-09-28 DIAGNOSIS — I50.32 CHRONIC DIASTOLIC CONGESTIVE HEART FAILURE (HCC): ICD-10-CM

## 2023-09-28 DIAGNOSIS — I50.32 CHRONIC HEART FAILURE WITH PRESERVED EJECTION FRACTION (HCC): Primary | ICD-10-CM

## 2023-09-28 DIAGNOSIS — E89.89 LYMPHEDEMA OF UPPER EXTREMITY FOLLOWING LYMPHADENECTOMY: ICD-10-CM

## 2023-09-28 DIAGNOSIS — I10 ESSENTIAL HYPERTENSION: ICD-10-CM

## 2023-09-28 LAB
ANION GAP SERPL CALCULATED.3IONS-SCNC: 10 MMOL/L (ref 3–16)
BUN SERPL-MCNC: 33 MG/DL (ref 7–20)
CALCIUM SERPL-MCNC: 9.2 MG/DL (ref 8.3–10.6)
CHLORIDE SERPL-SCNC: 92 MMOL/L (ref 99–110)
CO2 SERPL-SCNC: 33 MMOL/L (ref 21–32)
CREAT SERPL-MCNC: 1.4 MG/DL (ref 0.6–1.1)
GFR SERPLBLD CREATININE-BSD FMLA CKD-EPI: 45 ML/MIN/{1.73_M2}
GLUCOSE SERPL-MCNC: 233 MG/DL (ref 70–99)
NT-PROBNP SERPL-MCNC: 445 PG/ML (ref 0–124)
POTASSIUM SERPL-SCNC: 4.4 MMOL/L (ref 3.5–5.1)
SODIUM SERPL-SCNC: 135 MMOL/L (ref 136–145)

## 2023-09-28 PROCEDURE — 80048 BASIC METABOLIC PNL TOTAL CA: CPT

## 2023-09-28 PROCEDURE — 83880 ASSAY OF NATRIURETIC PEPTIDE: CPT

## 2023-09-28 PROCEDURE — 6360000002 HC RX W HCPCS: Performed by: INTERNAL MEDICINE

## 2023-09-28 PROCEDURE — 2580000003 HC RX 258: Performed by: INTERNAL MEDICINE

## 2023-09-28 PROCEDURE — 96374 THER/PROPH/DIAG INJ IV PUSH: CPT

## 2023-09-28 PROCEDURE — 99211 OFF/OP EST MAY X REQ PHY/QHP: CPT

## 2023-09-28 RX ORDER — SODIUM CHLORIDE 0.9 % (FLUSH) 0.9 %
5-40 SYRINGE (ML) INJECTION ONCE
Status: COMPLETED | OUTPATIENT
Start: 2023-09-28 | End: 2023-09-28

## 2023-09-28 RX ORDER — FUROSEMIDE 10 MG/ML
120 INJECTION INTRAMUSCULAR; INTRAVENOUS ONCE
Status: COMPLETED | OUTPATIENT
Start: 2023-09-28 | End: 2023-09-28

## 2023-09-28 RX ORDER — SODIUM CHLORIDE 0.9 % (FLUSH) 0.9 %
5-40 SYRINGE (ML) INJECTION ONCE
Start: 2023-10-05 | End: 2023-10-05

## 2023-09-28 RX ORDER — FUROSEMIDE 10 MG/ML
120 INJECTION INTRAMUSCULAR; INTRAVENOUS ONCE
Start: 2023-10-05 | End: 2023-10-05

## 2023-09-28 RX ADMIN — FUROSEMIDE 120 MG: 10 INJECTION, SOLUTION INTRAMUSCULAR; INTRAVENOUS at 14:16

## 2023-09-28 RX ADMIN — SODIUM CHLORIDE, PRESERVATIVE FREE 10 ML: 5 INJECTION INTRAVENOUS at 14:17

## 2023-09-28 NOTE — PROGRESS NOTES
Pt ambulatory to infusion center for Lasix IVP. IV access obtained. Labs drawn. IVP Lasix administered; 120 mg IVP given over 6 minutes. IV removed. Pt to return in 2 weeks for same treatment

## 2023-10-03 ENCOUNTER — TELEPHONE (OUTPATIENT)
Dept: CARDIOLOGY CLINIC | Age: 53
End: 2023-10-03

## 2023-10-03 NOTE — TELEPHONE ENCOUNTER
----- Message from Chiquis Young MD sent at 9/29/2023  2:11 PM EDT -----  Call patient. Labs look okay. No changes.   ARETHA

## 2023-10-11 ENCOUNTER — OFFICE VISIT (OUTPATIENT)
Dept: CARDIOLOGY CLINIC | Age: 53
End: 2023-10-11

## 2023-10-11 VITALS
WEIGHT: 181 LBS | SYSTOLIC BLOOD PRESSURE: 92 MMHG | HEART RATE: 75 BPM | HEIGHT: 64 IN | BODY MASS INDEX: 30.9 KG/M2 | OXYGEN SATURATION: 93 % | DIASTOLIC BLOOD PRESSURE: 48 MMHG

## 2023-10-11 DIAGNOSIS — I10 ESSENTIAL HYPERTENSION: ICD-10-CM

## 2023-10-11 DIAGNOSIS — R06.02 SOB (SHORTNESS OF BREATH): ICD-10-CM

## 2023-10-11 DIAGNOSIS — I50.32 CHRONIC HEART FAILURE WITH PRESERVED EJECTION FRACTION (HCC): Primary | ICD-10-CM

## 2023-10-11 DIAGNOSIS — I25.10 CORONARY ARTERY DISEASE INVOLVING NATIVE CORONARY ARTERY OF NATIVE HEART WITHOUT ANGINA PECTORIS: ICD-10-CM

## 2023-10-11 NOTE — PATIENT INSTRUCTIONS
PLAN:  All cardiac test and lab results personally reviewed by me during this office visit. Continue current medication  2. Continue IV Lasix every other Thursday. 3.   IF BP is low below 100 on the Top, please HOLD the evening Dose of Torsemide. Take the Morning dose Only.    4.  See Dr. Dax Galan in 4 months

## 2023-10-11 NOTE — PROGRESS NOTES
lymphedema machine. She uses automatic lymph machine which works better for her than compression hose. Recommend still wearing hose after using the machine. Rheumatoid arthritis involving multiple sites  She is on chronic pain meds. PLAN:  All cardiac test and lab results personally reviewed by me during this office visit. Continue current medication  2. Continue IV Lasix every other Thursday. 3.   IF BP is low below 100 on the Top, please HOLD the evening Dose of Torsemide. Take the Morning dose Only. 4.  See Dr. Cassie Zuniga in 4 months     Scribe's attestation: This note was scribed in the presence of Kim Daley M.D. by Georgi Honeycutt RN     The scribe's documentation has been prepared under my direction and personally reviewed by me in its entirety. I confirm that the note above accurately reflects all work, treatment, procedures, and medical decision making performed by me. Time Based Itemization  A total of 40 minutes was spent on today's patient encounter. If applicable, non-patient-facing activities:  ( x)Preparing to see the patient and reviewing records  (x ) Individual interpretation of results  (x ) Discussion or coordination of care with other health care professionals.     ( x) Ordering of unique tests, medications, or procedures  ( x) Documentation within the EHR                                             :      Kim Daley MD [facility]

## 2023-10-12 ENCOUNTER — HOSPITAL ENCOUNTER (OUTPATIENT)
Dept: ONCOLOGY | Age: 53
Setting detail: INFUSION SERIES
Discharge: HOME | End: 2023-10-12
Payer: COMMERCIAL

## 2023-10-12 ENCOUNTER — TELEPHONE (OUTPATIENT)
Dept: CARDIOLOGY CLINIC | Age: 53
End: 2023-10-12

## 2023-10-12 VITALS
TEMPERATURE: 97 F | WEIGHT: 180 LBS | BODY MASS INDEX: 30.9 KG/M2 | SYSTOLIC BLOOD PRESSURE: 146 MMHG | OXYGEN SATURATION: 97 % | DIASTOLIC BLOOD PRESSURE: 83 MMHG | RESPIRATION RATE: 16 BRPM | HEART RATE: 72 BPM

## 2023-10-12 DIAGNOSIS — I50.32 CHRONIC HEART FAILURE WITH PRESERVED EJECTION FRACTION (HCC): Primary | ICD-10-CM

## 2023-10-12 DIAGNOSIS — E87.1 HYPONATREMIA: ICD-10-CM

## 2023-10-12 DIAGNOSIS — N18.4 CKD (CHRONIC KIDNEY DISEASE) STAGE 4, GFR 15-29 ML/MIN (HCC): ICD-10-CM

## 2023-10-12 DIAGNOSIS — I50.32 CHRONIC DIASTOLIC CONGESTIVE HEART FAILURE (HCC): ICD-10-CM

## 2023-10-12 DIAGNOSIS — I10 ESSENTIAL HYPERTENSION: ICD-10-CM

## 2023-10-12 DIAGNOSIS — E89.89 LYMPHEDEMA OF UPPER EXTREMITY FOLLOWING LYMPHADENECTOMY: ICD-10-CM

## 2023-10-12 DIAGNOSIS — R06.02 SOB (SHORTNESS OF BREATH): ICD-10-CM

## 2023-10-12 DIAGNOSIS — I89.0 LYMPHEDEMA OF UPPER EXTREMITY FOLLOWING LYMPHADENECTOMY: ICD-10-CM

## 2023-10-12 LAB
ANION GAP SERPL CALCULATED.3IONS-SCNC: 11 MMOL/L (ref 3–16)
BUN SERPL-MCNC: 46 MG/DL (ref 7–20)
CALCIUM SERPL-MCNC: 9 MG/DL (ref 8.3–10.6)
CHLORIDE SERPL-SCNC: 94 MMOL/L (ref 99–110)
CO2 SERPL-SCNC: 30 MMOL/L (ref 21–32)
CREAT SERPL-MCNC: 1.4 MG/DL (ref 0.6–1.1)
GFR SERPLBLD CREATININE-BSD FMLA CKD-EPI: 45 ML/MIN/{1.73_M2}
GLUCOSE SERPL-MCNC: 223 MG/DL (ref 70–99)
NT-PROBNP SERPL-MCNC: 198 PG/ML (ref 0–124)
POTASSIUM SERPL-SCNC: 4 MMOL/L (ref 3.5–5.1)
SODIUM SERPL-SCNC: 135 MMOL/L (ref 136–145)

## 2023-10-12 PROCEDURE — 83880 ASSAY OF NATRIURETIC PEPTIDE: CPT

## 2023-10-12 PROCEDURE — 6360000002 HC RX W HCPCS: Performed by: INTERNAL MEDICINE

## 2023-10-12 PROCEDURE — 96374 THER/PROPH/DIAG INJ IV PUSH: CPT

## 2023-10-12 PROCEDURE — 99211 OFF/OP EST MAY X REQ PHY/QHP: CPT

## 2023-10-12 PROCEDURE — 2580000003 HC RX 258: Performed by: INTERNAL MEDICINE

## 2023-10-12 PROCEDURE — 80048 BASIC METABOLIC PNL TOTAL CA: CPT

## 2023-10-12 RX ORDER — SODIUM CHLORIDE 0.9 % (FLUSH) 0.9 %
5-40 SYRINGE (ML) INJECTION ONCE
Start: 2023-10-19 | End: 2023-10-19

## 2023-10-12 RX ORDER — FUROSEMIDE 10 MG/ML
120 INJECTION INTRAMUSCULAR; INTRAVENOUS ONCE
Status: COMPLETED | OUTPATIENT
Start: 2023-10-12 | End: 2023-10-12

## 2023-10-12 RX ORDER — SODIUM CHLORIDE 0.9 % (FLUSH) 0.9 %
5-40 SYRINGE (ML) INJECTION ONCE
Status: COMPLETED | OUTPATIENT
Start: 2023-10-12 | End: 2023-10-12

## 2023-10-12 RX ORDER — FUROSEMIDE 10 MG/ML
120 INJECTION INTRAMUSCULAR; INTRAVENOUS ONCE
Start: 2023-10-19 | End: 2023-10-19

## 2023-10-12 RX ADMIN — FUROSEMIDE 120 MG: 10 INJECTION, SOLUTION INTRAMUSCULAR; INTRAVENOUS at 14:19

## 2023-10-12 RX ADMIN — SODIUM CHLORIDE, PRESERVATIVE FREE 10 ML: 5 INJECTION INTRAVENOUS at 14:18

## 2023-10-12 NOTE — TELEPHONE ENCOUNTER
----- Message from Ramona Noriega MD sent at 10/12/2023  2:43 PM EDT -----  Please call patient's  and let him know that her labs look great. No changes.   ARETHA Verbal Group Note    Jennifer rated her mood as happy at a zero but stated that she was so happy that is was more than a 10 so she gave it a zero. Jennifer was disruptive throughout group, frequently interrupting peers, making noise while playing with the sand tray (she was directed to put the tray away and complied), and was often sarcastic.  She giggled throughout group and pulled other peers into giggling with her. She was redirected several times and then asked to take a break from group. When she returned she was slightly less disruptive but continued to giggle throughout group. She was given feedback about respecting her peers when they are talking. Jennifer processed her sleeping habits and how she stays up on her phone until 1 am instead of going to bed at 9 pm. Her peers provided her with feedback such as leaving her phone downstairs and using a fan or sound machine to help with drowning out background noise to fall asleep easier. The group began working on an art activity of tracing both hands and then drawing/listed positive and negative aspects of their past on one hand and what they want for their future in the other hand and then share what their past holds and what their future holds. The group ran out of time before they could share their projects with each other. They discussed the possibility of finishing up in the next verbal group. Jennifer attempted to distract peers during the activity and loudly shared that her future was drugs and working at [email or web address]. She received feedback about glorifying drug use.    Case Management Note    Spoke with [facility]. They don't have records from [facility] and they won't look at her referral until that arrives. LVM for mom about this and how the records need is delaying treatment.    Xenia Mehta, MSW, LGSW

## 2023-10-16 ENCOUNTER — OFFICE VISIT (OUTPATIENT)
Dept: INTERNAL MEDICINE CLINIC | Age: 53
End: 2023-10-16
Payer: COMMERCIAL

## 2023-10-16 VITALS
HEART RATE: 71 BPM | WEIGHT: 185.6 LBS | DIASTOLIC BLOOD PRESSURE: 78 MMHG | HEIGHT: 64 IN | OXYGEN SATURATION: 94 % | SYSTOLIC BLOOD PRESSURE: 130 MMHG | BODY MASS INDEX: 31.69 KG/M2

## 2023-10-16 DIAGNOSIS — E06.3 HYPOTHYROIDISM DUE TO HASHIMOTO'S THYROIDITIS: Primary | ICD-10-CM

## 2023-10-16 DIAGNOSIS — I50.32 CHRONIC HEART FAILURE WITH PRESERVED EJECTION FRACTION (HCC): Chronic | ICD-10-CM

## 2023-10-16 DIAGNOSIS — I10 ESSENTIAL HYPERTENSION: Chronic | ICD-10-CM

## 2023-10-16 DIAGNOSIS — E78.5 HYPERLIPIDEMIA LDL GOAL <70: ICD-10-CM

## 2023-10-16 DIAGNOSIS — E78.1 HYPERTRIGLYCERIDEMIA: ICD-10-CM

## 2023-10-16 DIAGNOSIS — G44.211 INTRACTABLE EPISODIC TENSION-TYPE HEADACHE: ICD-10-CM

## 2023-10-16 DIAGNOSIS — F33.2 SEVERE EPISODE OF RECURRENT MAJOR DEPRESSIVE DISORDER, WITHOUT PSYCHOTIC FEATURES (HCC): ICD-10-CM

## 2023-10-16 DIAGNOSIS — Z23 NEED FOR INFLUENZA VACCINATION: ICD-10-CM

## 2023-10-16 DIAGNOSIS — E03.8 HYPOTHYROIDISM DUE TO HASHIMOTO'S THYROIDITIS: Primary | ICD-10-CM

## 2023-10-16 DIAGNOSIS — I25.83 CORONARY ARTERY DISEASE DUE TO LIPID RICH PLAQUE: Chronic | ICD-10-CM

## 2023-10-16 DIAGNOSIS — I25.10 CORONARY ARTERY DISEASE DUE TO LIPID RICH PLAQUE: Chronic | ICD-10-CM

## 2023-10-16 PROCEDURE — G8417 CALC BMI ABV UP PARAM F/U: HCPCS | Performed by: INTERNAL MEDICINE

## 2023-10-16 PROCEDURE — 1036F TOBACCO NON-USER: CPT | Performed by: INTERNAL MEDICINE

## 2023-10-16 PROCEDURE — 3074F SYST BP LT 130 MM HG: CPT | Performed by: INTERNAL MEDICINE

## 2023-10-16 PROCEDURE — 90471 IMMUNIZATION ADMIN: CPT | Performed by: INTERNAL MEDICINE

## 2023-10-16 PROCEDURE — 3017F COLORECTAL CA SCREEN DOC REV: CPT | Performed by: INTERNAL MEDICINE

## 2023-10-16 PROCEDURE — G8427 DOCREV CUR MEDS BY ELIG CLIN: HCPCS | Performed by: INTERNAL MEDICINE

## 2023-10-16 PROCEDURE — G8482 FLU IMMUNIZE ORDER/ADMIN: HCPCS | Performed by: INTERNAL MEDICINE

## 2023-10-16 PROCEDURE — 90674 CCIIV4 VAC NO PRSV 0.5 ML IM: CPT | Performed by: INTERNAL MEDICINE

## 2023-10-16 PROCEDURE — 99214 OFFICE O/P EST MOD 30 MIN: CPT | Performed by: INTERNAL MEDICINE

## 2023-10-16 PROCEDURE — 3078F DIAST BP <80 MM HG: CPT | Performed by: INTERNAL MEDICINE

## 2023-10-16 RX ORDER — ROSUVASTATIN CALCIUM 20 MG/1
20 TABLET, COATED ORAL NIGHTLY
Qty: 90 TABLET | Refills: 3 | Status: SHIPPED | OUTPATIENT
Start: 2023-10-16

## 2023-10-16 NOTE — PROGRESS NOTES
Patient: Honorio Flores is a 46 y.o. female who presents today with the following Chief Complaint(s):  Chief Complaint   Patient presents with    3 Month Follow-Up       HPI    Patient's primary language is Maltese. Interview is conducted with her , declined virtual certified Maltese . Did see Dr. Torres Gutierrez, she did not make any changes to her medication. Saw Dr. Sixto Marquez 10/11/23. Note reviewed. Stable. On IV Lasix every other week in addition to routine medications. DM- stable. Following with Dr. Rhodia Aase    Did see Dr. Tamar Hernadez in September. Stable. Note reviewed. Did get flu vaccine today. Tried to get COVID vaccine in August but was told that she did not qualify as she was too young. HA and dizziness are better with medication.      Lab Results   Component Value Date    CHOL 170 09/14/2023    CHOL 226 (H) 03/10/2023    CHOL 201 (H) 08/22/2022     Lab Results   Component Value Date    TRIG 258 (H) 09/14/2023    TRIG 530 (H) 03/10/2023    TRIG 633 (H) 08/22/2022     Lab Results   Component Value Date    HDL 30 (L) 09/14/2023    HDL 25 (L) 03/10/2023    HDL 31 (L) 08/22/2022     Lab Results   Component Value Date    LDLCALC 88 09/14/2023    [facility] see below 03/10/2023    [facility] see below 08/22/2022     Lab Results   Component Value Date    LABVLDL 52 09/14/2023    LABVLDL see below 03/10/2023    LABVLDL see below 08/22/2022     No results found for: \"CHOLHDLRATIO\"  Hemoglobin A1C   Date Value Ref Range Status   09/14/2023 8.0 See comment % Final     Comment:     Comment:  Diagnosis of Diabetes: > or = 6.5%  Increased risk of diabetes (Prediabetes): 5.7-6.4%  Glycemic Control: Nonpregnant Adults: <7.0%                    Pregnant: <6.0%         Lab Results   Component Value Date    LABA1C 8.0 09/14/2023    LABA1C 8.3 08/29/2023    LABA1C 10.2 03/10/2023     Lab Results   Component Value Date    MALBCR 55.2 (H) 09/14/2023    LDLCALC 88 09/14/2023    CREATININE 1.4 (H) 10/12/2023     Lab

## 2023-10-22 PROBLEM — E78.1 HYPERTRIGLYCERIDEMIA: Status: ACTIVE | Noted: 2023-10-22

## 2023-10-26 ENCOUNTER — HOSPITAL ENCOUNTER (OUTPATIENT)
Dept: ONCOLOGY | Age: 53
Setting detail: INFUSION SERIES
Discharge: HOME | End: 2023-10-26
Payer: COMMERCIAL

## 2023-10-26 VITALS
RESPIRATION RATE: 16 BRPM | TEMPERATURE: 98.5 F | DIASTOLIC BLOOD PRESSURE: 81 MMHG | SYSTOLIC BLOOD PRESSURE: 128 MMHG | HEART RATE: 76 BPM

## 2023-10-26 DIAGNOSIS — I50.32 CHRONIC HEART FAILURE WITH PRESERVED EJECTION FRACTION (HCC): Primary | ICD-10-CM

## 2023-10-26 DIAGNOSIS — I50.32 CHRONIC DIASTOLIC CONGESTIVE HEART FAILURE (HCC): ICD-10-CM

## 2023-10-26 LAB
ANION GAP SERPL CALCULATED.3IONS-SCNC: 10 MMOL/L (ref 3–16)
BUN SERPL-MCNC: 22 MG/DL (ref 7–20)
CALCIUM SERPL-MCNC: 8.9 MG/DL (ref 8.3–10.6)
CHLORIDE SERPL-SCNC: 100 MMOL/L (ref 99–110)
CO2 SERPL-SCNC: 29 MMOL/L (ref 21–32)
CREAT SERPL-MCNC: 1.2 MG/DL (ref 0.6–1.1)
GFR SERPLBLD CREATININE-BSD FMLA CKD-EPI: 54 ML/MIN/{1.73_M2}
GLUCOSE SERPL-MCNC: 228 MG/DL (ref 70–99)
NT-PROBNP SERPL-MCNC: 249 PG/ML (ref 0–124)
POTASSIUM SERPL-SCNC: 4.2 MMOL/L (ref 3.5–5.1)
SODIUM SERPL-SCNC: 139 MMOL/L (ref 136–145)

## 2023-10-26 PROCEDURE — 80048 BASIC METABOLIC PNL TOTAL CA: CPT

## 2023-10-26 PROCEDURE — 2580000003 HC RX 258: Performed by: INTERNAL MEDICINE

## 2023-10-26 PROCEDURE — 99211 OFF/OP EST MAY X REQ PHY/QHP: CPT

## 2023-10-26 PROCEDURE — 6360000002 HC RX W HCPCS: Performed by: INTERNAL MEDICINE

## 2023-10-26 PROCEDURE — 96374 THER/PROPH/DIAG INJ IV PUSH: CPT

## 2023-10-26 PROCEDURE — 83880 ASSAY OF NATRIURETIC PEPTIDE: CPT

## 2023-10-26 RX ORDER — FUROSEMIDE 10 MG/ML
120 INJECTION INTRAMUSCULAR; INTRAVENOUS ONCE
Status: COMPLETED | OUTPATIENT
Start: 2023-10-26 | End: 2023-10-26

## 2023-10-26 RX ORDER — SODIUM CHLORIDE 0.9 % (FLUSH) 0.9 %
5-40 SYRINGE (ML) INJECTION ONCE
Status: COMPLETED | OUTPATIENT
Start: 2023-10-26 | End: 2023-10-26

## 2023-10-26 RX ORDER — SODIUM CHLORIDE 0.9 % (FLUSH) 0.9 %
5-40 SYRINGE (ML) INJECTION ONCE
Start: 2023-11-02 | End: 2023-11-02

## 2023-10-26 RX ORDER — FUROSEMIDE 10 MG/ML
120 INJECTION INTRAMUSCULAR; INTRAVENOUS ONCE
Start: 2023-11-02 | End: 2023-11-02

## 2023-10-26 RX ADMIN — SODIUM CHLORIDE, PRESERVATIVE FREE 10 ML: 5 INJECTION INTRAVENOUS at 14:18

## 2023-10-26 RX ADMIN — FUROSEMIDE 120 MG: 10 INJECTION, SOLUTION INTRAMUSCULAR; INTRAVENOUS at 14:18

## 2023-10-26 NOTE — PROGRESS NOTES
Pt ambulatory to infusion center for Lasix IVP. IV access obtained. Labs drawn. IVP Lasix administered; 120 mg IVP given over 2 minutes. IV removed. Pt/spouse denied need for printed AVS. Pt to return in 2 weeks for same treatment.

## 2023-10-30 DIAGNOSIS — G44.221 CHRONIC TENSION-TYPE HEADACHE, INTRACTABLE: ICD-10-CM

## 2023-10-31 RX ORDER — BUTALBITAL, ACETAMINOPHEN AND CAFFEINE 50; 325; 40 MG/1; MG/1; MG/1
1 TABLET ORAL EVERY 6 HOURS PRN
Qty: 30 TABLET | Refills: 0 | Status: SHIPPED | OUTPATIENT
Start: 2023-10-31

## 2023-10-31 NOTE — TELEPHONE ENCOUNTER
Last OV: 10/16/2023  Next OV: 1/23/2024    Next appointment due:around 1/16/2024    Last fill:5/15/23  Refills:0

## 2023-11-01 ENCOUNTER — TELEPHONE (OUTPATIENT)
Dept: ENDOCRINOLOGY | Age: 53
End: 2023-11-01

## 2023-11-01 DIAGNOSIS — I42.9 CARDIOMYOPATHY, UNSPECIFIED TYPE (HCC): Chronic | ICD-10-CM

## 2023-11-01 DIAGNOSIS — Z79.4 TYPE 2 DIABETES MELLITUS WITH OTHER CIRCULATORY COMPLICATION, WITH LONG-TERM CURRENT USE OF INSULIN (HCC): Primary | ICD-10-CM

## 2023-11-01 DIAGNOSIS — I50.30 (HFPEF) HEART FAILURE WITH PRESERVED EJECTION FRACTION (HCC): Chronic | ICD-10-CM

## 2023-11-01 DIAGNOSIS — E11.59 TYPE 2 DIABETES MELLITUS WITH OTHER CIRCULATORY COMPLICATION, WITH LONG-TERM CURRENT USE OF INSULIN (HCC): Primary | ICD-10-CM

## 2023-11-01 DIAGNOSIS — I25.10 CORONARY ARTERY DISEASE DUE TO LIPID RICH PLAQUE: Chronic | ICD-10-CM

## 2023-11-01 DIAGNOSIS — I25.10 CORONARY ARTERY DISEASE INVOLVING NATIVE CORONARY ARTERY OF NATIVE HEART WITHOUT ANGINA PECTORIS: Chronic | ICD-10-CM

## 2023-11-01 DIAGNOSIS — N28.9 RENAL INSUFFICIENCY: Chronic | ICD-10-CM

## 2023-11-01 DIAGNOSIS — I25.83 CORONARY ARTERY DISEASE DUE TO LIPID RICH PLAQUE: Chronic | ICD-10-CM

## 2023-11-01 NOTE — TELEPHONE ENCOUNTER
Pts  calling and states the weight loss medication (he did not know name because she throws away the packaging) is on backorder for another couple weeks and pharmacy told him have to Dr prescribe an alternative    Thiago Leon on [facility]# Abdelghani-husb # [phone]

## 2023-11-02 RX ORDER — SEMAGLUTIDE 0.68 MG/ML
INJECTION, SOLUTION SUBCUTANEOUS
Qty: 3 ML | Refills: 1 | Status: SHIPPED | OUTPATIENT
Start: 2023-11-02

## 2023-11-02 NOTE — TELEPHONE ENCOUNTER
From the last office note it appears that she was on Trulicity 1.5 mg weekly if that is on backorder we can switch her to Ozempic 0.5 mg weekly and possibly give her samples if Ozempic is not available as well.

## 2023-11-03 ENCOUNTER — TELEPHONE (OUTPATIENT)
Dept: CARDIOLOGY CLINIC | Age: 53
End: 2023-11-03

## 2023-11-03 ENCOUNTER — TELEPHONE (OUTPATIENT)
Dept: ENDOCRINOLOGY | Age: 53
End: 2023-11-03

## 2023-11-03 ENCOUNTER — TELEPHONE (OUTPATIENT)
Dept: PULMONOLOGY | Age: 53
End: 2023-11-03

## 2023-11-03 NOTE — TELEPHONE ENCOUNTER
Chart reflects   Spironolactone 50mg BID  Torsemide 50mg BID    120 mg IVP given over 2 minutes every 2 weeks. In the past, Metolazone 2.5mg was 2 times a week on Wednesday and Sunday. No longer taking Metolazone. Stopped on 3/13/23 at DC per Dr. Vincent Graves. Called pt twice and left two messages.

## 2023-11-03 NOTE — TELEPHONE ENCOUNTER
Spoke to Infusion center [facility]. They can move patient to Tuesday 11/7 at [address] patient  again. He picked up. Updated about move in Infusion to Tuesday at 10am.     He will call to confirm date/time with them. Also she is to take an extra Torsemide 50mg tomorrow.  verbalizes understanding.

## 2023-11-03 NOTE — TELEPHONE ENCOUNTER
Submitted PA for Ozempic  Via [facility] Key: [id number] STATUS: PENDING. Follow up done daily; if no response in three days we will refax for status check. If another three days goes by with no response we will call the insurance for status.

## 2023-11-03 NOTE — TELEPHONE ENCOUNTER
Husbands states that Pt went from weighing 189 lbs to weighing 196 lbs in the span of just a few days and would like to know what should be done. Please call to advise. Thank you.

## 2023-11-03 NOTE — TELEPHONE ENCOUNTER
Maria R Miller is requesting a overnight oximetry on RA be ordered for recertification with medicaid for overnight O2 is this ok to do?

## 2023-11-07 ENCOUNTER — HOSPITAL ENCOUNTER (OUTPATIENT)
Dept: ONCOLOGY | Age: 53
Setting detail: INFUSION SERIES
Discharge: HOME | End: 2023-11-07
Payer: COMMERCIAL

## 2023-11-07 ENCOUNTER — NURSE ONLY (OUTPATIENT)
Dept: ENDOCRINOLOGY | Age: 53
End: 2023-11-07

## 2023-11-07 VITALS
RESPIRATION RATE: 16 BRPM | DIASTOLIC BLOOD PRESSURE: 69 MMHG | SYSTOLIC BLOOD PRESSURE: 120 MMHG | TEMPERATURE: 96.3 F | HEART RATE: 75 BPM

## 2023-11-07 DIAGNOSIS — I50.32 CHRONIC DIASTOLIC CONGESTIVE HEART FAILURE (HCC): ICD-10-CM

## 2023-11-07 DIAGNOSIS — I50.32 CHRONIC HEART FAILURE WITH PRESERVED EJECTION FRACTION (HCC): Primary | ICD-10-CM

## 2023-11-07 LAB
ANION GAP SERPL CALCULATED.3IONS-SCNC: 13 MMOL/L (ref 3–16)
BUN SERPL-MCNC: 29 MG/DL (ref 7–20)
CALCIUM SERPL-MCNC: 8.9 MG/DL (ref 8.3–10.6)
CHLORIDE SERPL-SCNC: 98 MMOL/L (ref 99–110)
CO2 SERPL-SCNC: 26 MMOL/L (ref 21–32)
CREAT SERPL-MCNC: 1.4 MG/DL (ref 0.6–1.1)
GFR SERPLBLD CREATININE-BSD FMLA CKD-EPI: 45 ML/MIN/{1.73_M2}
GLUCOSE SERPL-MCNC: 264 MG/DL (ref 70–99)
NT-PROBNP SERPL-MCNC: 152 PG/ML (ref 0–124)
POTASSIUM SERPL-SCNC: 4.5 MMOL/L (ref 3.5–5.1)
SODIUM SERPL-SCNC: 137 MMOL/L (ref 136–145)

## 2023-11-07 PROCEDURE — 80048 BASIC METABOLIC PNL TOTAL CA: CPT

## 2023-11-07 PROCEDURE — 83880 ASSAY OF NATRIURETIC PEPTIDE: CPT

## 2023-11-07 PROCEDURE — 99211 OFF/OP EST MAY X REQ PHY/QHP: CPT

## 2023-11-07 PROCEDURE — 6360000002 HC RX W HCPCS: Performed by: INTERNAL MEDICINE

## 2023-11-07 PROCEDURE — 96374 THER/PROPH/DIAG INJ IV PUSH: CPT

## 2023-11-07 PROCEDURE — 2580000003 HC RX 258: Performed by: INTERNAL MEDICINE

## 2023-11-07 RX ORDER — FUROSEMIDE 10 MG/ML
120 INJECTION INTRAMUSCULAR; INTRAVENOUS ONCE
Start: 2023-11-09 | End: 2023-11-09

## 2023-11-07 RX ORDER — SODIUM CHLORIDE 0.9 % (FLUSH) 0.9 %
5-40 SYRINGE (ML) INJECTION ONCE
Status: COMPLETED | OUTPATIENT
Start: 2023-11-07 | End: 2023-11-07

## 2023-11-07 RX ORDER — FUROSEMIDE 10 MG/ML
120 INJECTION INTRAMUSCULAR; INTRAVENOUS ONCE
Status: COMPLETED | OUTPATIENT
Start: 2023-11-07 | End: 2023-11-07

## 2023-11-07 RX ORDER — SODIUM CHLORIDE 0.9 % (FLUSH) 0.9 %
5-40 SYRINGE (ML) INJECTION ONCE
Start: 2023-11-09 | End: 2023-11-09

## 2023-11-07 RX ORDER — DULAGLUTIDE 1.5 MG/.5ML
INJECTION, SOLUTION SUBCUTANEOUS
Qty: 2 ML | Refills: 0 | Status: SHIPPED | COMMUNITY
Start: 2023-11-07

## 2023-11-07 RX ADMIN — FUROSEMIDE 120 MG: 10 INJECTION, SOLUTION INTRAMUSCULAR; INTRAVENOUS at 13:17

## 2023-11-07 RX ADMIN — SODIUM CHLORIDE, PRESERVATIVE FREE 10 ML: 5 INJECTION INTRAVENOUS at 13:19

## 2023-11-07 NOTE — PROGRESS NOTES
Pt ambulatory to infusion center for Lasix IVP. IV access obtained. Labs drawn. IVP Lasix administered; 120 mg IVP given over 8 minutes. IV removed. Pt/spouse denied need for printed AVS. Pt to return in 2 weeks for same treatment.

## 2023-11-10 ENCOUNTER — TELEPHONE (OUTPATIENT)
Dept: CARDIOLOGY CLINIC | Age: 53
End: 2023-11-10

## 2023-11-10 NOTE — TELEPHONE ENCOUNTER
states pt has had a 10-12 lb gain over the last few days. She has additional swelling, sob and fatigue. Denies Chest pain. Please call to advise.

## 2023-11-10 NOTE — TELEPHONE ENCOUNTER
----- Message from Marylee Pitts, MD sent at 11/10/2023  4:11 PM EST -----  Call patient's . Her labs look good. No changes. These labs are very stable for her. ARETHA    Spoke to patient's spouse her current weight is 200, swelling and sob, tired more than normal, watching fluids and salt, no dining out or fast food. Taking medication as prescribed.  Weight yesterday 199, 198, then at the infusion center she dropped to 196

## 2023-11-10 NOTE — TELEPHONE ENCOUNTER
Chart review:  Spironolactone 50mg BID  Torsemide 50mg BID     120 mg IVP given over 2 minutes every 2 weeks. Got early IV  LASIX dose on 11/7/23 (120 mg IVP given over 8 minutes.)     In the past, Metolazone 2.5mg was 2 times a week on Wednesday and Sunday. No longer taking Metolazone. Stopped on 3/13/23 at DC per Dr. Malena Little.

## 2023-11-10 NOTE — TELEPHONE ENCOUNTER
Spoke to ARETHA per VO torsemide 100 in the Am and 50 in the pm, he will watch her diet more closely, continue this dose until the next infusion. Patient's spouse verbalized understanding.

## 2023-11-15 NOTE — TELEPHONE ENCOUNTER
Medication Refill    Medication needing refilled:  torsemide     Dosage of the medication:  150mg    How are you taking this medication (QD, BID, TID, QID, PRN):  Take 0.5 tablets by mouth in the morning and at bedtime     30 or 90 day supply called in:  30    When will you run out of your medication:    Which Pharmacy are we sending the medication to?     [address] [place] [address] [id number]  Phone: [phone]  Fax: [phone]

## 2023-11-16 RX ORDER — TORSEMIDE 100 MG/1
50 TABLET ORAL 2 TIMES DAILY
Qty: 30 TABLET | Refills: 6 | Status: SHIPPED | OUTPATIENT
Start: 2023-11-16

## 2023-11-22 ENCOUNTER — HOSPITAL ENCOUNTER (OUTPATIENT)
Dept: ONCOLOGY | Age: 53
Setting detail: INFUSION SERIES
Discharge: HOME | End: 2023-11-22
Payer: COMMERCIAL

## 2023-11-22 VITALS
DIASTOLIC BLOOD PRESSURE: 76 MMHG | RESPIRATION RATE: 16 BRPM | HEART RATE: 73 BPM | TEMPERATURE: 96.6 F | SYSTOLIC BLOOD PRESSURE: 132 MMHG

## 2023-11-22 DIAGNOSIS — I50.32 CHRONIC DIASTOLIC CONGESTIVE HEART FAILURE (HCC): Primary | ICD-10-CM

## 2023-11-22 DIAGNOSIS — I50.32 CHRONIC HEART FAILURE WITH PRESERVED EJECTION FRACTION (HCC): ICD-10-CM

## 2023-11-22 LAB
ANION GAP SERPL CALCULATED.3IONS-SCNC: 10 MMOL/L (ref 3–16)
BUN SERPL-MCNC: 34 MG/DL (ref 7–20)
CALCIUM SERPL-MCNC: 8.9 MG/DL (ref 8.3–10.6)
CHLORIDE SERPL-SCNC: 94 MMOL/L (ref 99–110)
CO2 SERPL-SCNC: 28 MMOL/L (ref 21–32)
CREAT SERPL-MCNC: 1.2 MG/DL (ref 0.6–1.1)
GFR SERPLBLD CREATININE-BSD FMLA CKD-EPI: 54 ML/MIN/{1.73_M2}
GLUCOSE SERPL-MCNC: 260 MG/DL (ref 70–99)
NT-PROBNP SERPL-MCNC: 103 PG/ML (ref 0–124)
POTASSIUM SERPL-SCNC: 4 MMOL/L (ref 3.5–5.1)
SODIUM SERPL-SCNC: 132 MMOL/L (ref 136–145)

## 2023-11-22 PROCEDURE — 2580000003 HC RX 258: Performed by: INTERNAL MEDICINE

## 2023-11-22 PROCEDURE — 80048 BASIC METABOLIC PNL TOTAL CA: CPT

## 2023-11-22 PROCEDURE — 83880 ASSAY OF NATRIURETIC PEPTIDE: CPT

## 2023-11-22 PROCEDURE — 96374 THER/PROPH/DIAG INJ IV PUSH: CPT

## 2023-11-22 PROCEDURE — 99211 OFF/OP EST MAY X REQ PHY/QHP: CPT

## 2023-11-22 PROCEDURE — 6360000002 HC RX W HCPCS: Performed by: INTERNAL MEDICINE

## 2023-11-22 RX ORDER — FUROSEMIDE 10 MG/ML
120 INJECTION INTRAMUSCULAR; INTRAVENOUS ONCE
Status: COMPLETED | OUTPATIENT
Start: 2023-11-22 | End: 2023-11-22

## 2023-11-22 RX ORDER — SODIUM CHLORIDE 0.9 % (FLUSH) 0.9 %
5-40 SYRINGE (ML) INJECTION ONCE
Start: 2023-11-23 | End: 2023-11-23

## 2023-11-22 RX ORDER — SODIUM CHLORIDE 0.9 % (FLUSH) 0.9 %
5-40 SYRINGE (ML) INJECTION ONCE
Status: COMPLETED | OUTPATIENT
Start: 2023-11-22 | End: 2023-11-22

## 2023-11-22 RX ORDER — FUROSEMIDE 10 MG/ML
120 INJECTION INTRAMUSCULAR; INTRAVENOUS ONCE
Start: 2023-11-23 | End: 2023-11-23

## 2023-11-22 RX ADMIN — Medication 10 ML: at 14:04

## 2023-11-22 RX ADMIN — FUROSEMIDE 120 MG: 10 INJECTION, SOLUTION INTRAMUSCULAR; INTRAVENOUS at 14:05

## 2023-11-22 NOTE — PROGRESS NOTES
Pt ambulatory to infusion center for Lasix IVP. IV access obtained. Labs drawn per orders.  Lasix administered; 120 mg IVP given over 2 minutes. IV removed. Pt/spouse denied need for printed AVS. Pt to return in 2 weeks  for same treatment.

## 2023-11-24 ENCOUNTER — TELEPHONE (OUTPATIENT)
Dept: CARDIOLOGY CLINIC | Age: 53
End: 2023-11-24

## 2023-11-24 RX ORDER — TORSEMIDE 100 MG/1
50 TABLET ORAL 2 TIMES DAILY
Qty: 30 TABLET | Refills: 6 | Status: SHIPPED | OUTPATIENT
Start: 2023-11-24

## 2023-11-24 NOTE — TELEPHONE ENCOUNTER
----- Message from Lorena Ellis MD sent at 11/24/2023  8:22 AM EST -----  Please call patient and let her know that her labs look good. No changes. ARETHA    Spoke to patient's spouse current weight is 193 she is doing better they need a refill on the torsemide.

## 2023-12-06 ENCOUNTER — TELEPHONE (OUTPATIENT)
Dept: CARDIOLOGY CLINIC | Age: 53
End: 2023-12-06

## 2023-12-06 NOTE — TELEPHONE ENCOUNTER
Medication Refill    Medication needing refilled:  torsemide (DEMADEX) 100 MG tablet     Dosage of the medication:    How are you taking this medication (QD, BID, TID, QID, PRN):  Take 1 tablet by mouth in the morning and 0.5 at bedtime     30 or 90 day supply called in:  90 days    When will you run out of your medication:    Which Pharmacy are we sending the medication to?:  [address], [address] [facility] -  [phone]

## 2023-12-07 ENCOUNTER — HOSPITAL ENCOUNTER (OUTPATIENT)
Dept: ONCOLOGY | Age: 53
Setting detail: INFUSION SERIES
Discharge: HOME | End: 2023-12-07
Payer: COMMERCIAL

## 2023-12-07 VITALS
HEART RATE: 85 BPM | TEMPERATURE: 96.8 F | SYSTOLIC BLOOD PRESSURE: 140 MMHG | DIASTOLIC BLOOD PRESSURE: 70 MMHG | RESPIRATION RATE: 16 BRPM

## 2023-12-07 DIAGNOSIS — I50.32 CHRONIC DIASTOLIC CONGESTIVE HEART FAILURE (HCC): Primary | ICD-10-CM

## 2023-12-07 DIAGNOSIS — I50.32 CHRONIC HEART FAILURE WITH PRESERVED EJECTION FRACTION (HCC): ICD-10-CM

## 2023-12-07 LAB
ANION GAP SERPL CALCULATED.3IONS-SCNC: 6 MMOL/L (ref 3–16)
BUN SERPL-MCNC: 27 MG/DL (ref 7–20)
CALCIUM SERPL-MCNC: 9.3 MG/DL (ref 8.3–10.6)
CHLORIDE SERPL-SCNC: 99 MMOL/L (ref 99–110)
CO2 SERPL-SCNC: 34 MMOL/L (ref 21–32)
CREAT SERPL-MCNC: 1.2 MG/DL (ref 0.6–1.1)
GFR SERPLBLD CREATININE-BSD FMLA CKD-EPI: 54 ML/MIN/{1.73_M2}
GLUCOSE SERPL-MCNC: 216 MG/DL (ref 70–99)
NT-PROBNP SERPL-MCNC: 242 PG/ML (ref 0–124)
POTASSIUM SERPL-SCNC: 4.4 MMOL/L (ref 3.5–5.1)
SODIUM SERPL-SCNC: 139 MMOL/L (ref 136–145)

## 2023-12-07 PROCEDURE — 80048 BASIC METABOLIC PNL TOTAL CA: CPT

## 2023-12-07 PROCEDURE — 83880 ASSAY OF NATRIURETIC PEPTIDE: CPT

## 2023-12-07 PROCEDURE — 99211 OFF/OP EST MAY X REQ PHY/QHP: CPT

## 2023-12-07 PROCEDURE — 2580000003 HC RX 258: Performed by: INTERNAL MEDICINE

## 2023-12-07 PROCEDURE — 96374 THER/PROPH/DIAG INJ IV PUSH: CPT

## 2023-12-07 PROCEDURE — 6360000002 HC RX W HCPCS: Performed by: INTERNAL MEDICINE

## 2023-12-07 RX ORDER — FUROSEMIDE 10 MG/ML
120 INJECTION INTRAMUSCULAR; INTRAVENOUS ONCE
Start: 2023-12-14 | End: 2023-12-14

## 2023-12-07 RX ORDER — SODIUM CHLORIDE 0.9 % (FLUSH) 0.9 %
5-40 SYRINGE (ML) INJECTION ONCE
Status: COMPLETED | OUTPATIENT
Start: 2023-12-07 | End: 2023-12-07

## 2023-12-07 RX ORDER — FUROSEMIDE 10 MG/ML
120 INJECTION INTRAMUSCULAR; INTRAVENOUS ONCE
Status: COMPLETED | OUTPATIENT
Start: 2023-12-07 | End: 2023-12-07

## 2023-12-07 RX ORDER — SODIUM CHLORIDE 0.9 % (FLUSH) 0.9 %
5-40 SYRINGE (ML) INJECTION ONCE
Start: 2023-12-14 | End: 2023-12-14

## 2023-12-07 RX ADMIN — FUROSEMIDE 120 MG: 10 INJECTION, SOLUTION INTRAMUSCULAR; INTRAVENOUS at 14:29

## 2023-12-07 RX ADMIN — Medication 20 ML: at 14:45

## 2023-12-07 ASSESSMENT — PAIN SCALES - GENERAL
PAINLEVEL_OUTOF10: 7
PAINLEVEL_OUTOF10: 6

## 2023-12-07 ASSESSMENT — PAIN DESCRIPTION - DESCRIPTORS: DESCRIPTORS: DISCOMFORT;ACHING

## 2023-12-07 ASSESSMENT — PAIN DESCRIPTION - ORIENTATION: ORIENTATION: LEFT

## 2023-12-07 ASSESSMENT — PAIN - FUNCTIONAL ASSESSMENT: PAIN_FUNCTIONAL_ASSESSMENT: ACTIVITIES ARE NOT PREVENTED

## 2023-12-07 ASSESSMENT — PAIN DESCRIPTION - LOCATION: LOCATION: BACK

## 2023-12-07 ASSESSMENT — PAIN DESCRIPTION - DIRECTION: RADIATING_TOWARDS: NO

## 2023-12-07 ASSESSMENT — PAIN DESCRIPTION - PAIN TYPE: TYPE: CHRONIC PAIN

## 2023-12-07 NOTE — PROGRESS NOTES
Pt ambulatory to infusion center for Lasix IVP. IV access obtained. Labs drawn per orders. IVP Lasix administered; 120 mg IVP given over 10 minutes. IV removed. Pt/spouse denied need for printed AVS. Pt to return in 2 weeks  for same treatment.

## 2023-12-08 ENCOUNTER — TELEPHONE (OUTPATIENT)
Dept: CARDIOLOGY CLINIC | Age: 53
End: 2023-12-08

## 2023-12-08 NOTE — TELEPHONE ENCOUNTER
----- Message from Miriam Cifuentes MD sent at 12/8/2023  4:01 PM EST -----  Please call patient's  and let him know that her labs look okay. No changes.   ARETHA

## 2023-12-13 DIAGNOSIS — E11.9 TYPE 2 DIABETES MELLITUS WITHOUT COMPLICATION, WITH LONG-TERM CURRENT USE OF INSULIN (HCC): ICD-10-CM

## 2023-12-13 DIAGNOSIS — Z79.4 TYPE 2 DIABETES MELLITUS WITHOUT COMPLICATION, WITH LONG-TERM CURRENT USE OF INSULIN (HCC): ICD-10-CM

## 2023-12-13 RX ORDER — ERTUGLIFLOZIN 5 MG/1
TABLET, FILM COATED ORAL
Qty: 90 TABLET | Refills: 0 | Status: SHIPPED | OUTPATIENT
Start: 2023-12-13

## 2023-12-21 ENCOUNTER — TELEPHONE (OUTPATIENT)
Dept: CARDIOLOGY CLINIC | Age: 53
End: 2023-12-21

## 2023-12-21 ENCOUNTER — HOSPITAL ENCOUNTER (OUTPATIENT)
Dept: ONCOLOGY | Age: 53
Setting detail: INFUSION SERIES
Discharge: HOME | End: 2023-12-21
Payer: COMMERCIAL

## 2023-12-21 VITALS
HEART RATE: 74 BPM | SYSTOLIC BLOOD PRESSURE: 124 MMHG | RESPIRATION RATE: 16 BRPM | TEMPERATURE: 96 F | DIASTOLIC BLOOD PRESSURE: 73 MMHG

## 2023-12-21 DIAGNOSIS — I50.32 CHRONIC HEART FAILURE WITH PRESERVED EJECTION FRACTION (HCC): ICD-10-CM

## 2023-12-21 DIAGNOSIS — I50.32 CHRONIC DIASTOLIC CONGESTIVE HEART FAILURE (HCC): Primary | ICD-10-CM

## 2023-12-21 LAB
ANION GAP SERPL CALCULATED.3IONS-SCNC: 9 MMOL/L (ref 3–16)
BUN SERPL-MCNC: 29 MG/DL (ref 7–20)
CALCIUM SERPL-MCNC: 9 MG/DL (ref 8.3–10.6)
CHLORIDE SERPL-SCNC: 101 MMOL/L (ref 99–110)
CO2 SERPL-SCNC: 31 MMOL/L (ref 21–32)
CREAT SERPL-MCNC: 1.2 MG/DL (ref 0.6–1.1)
GFR SERPLBLD CREATININE-BSD FMLA CKD-EPI: 54 ML/MIN/{1.73_M2}
GLUCOSE SERPL-MCNC: 181 MG/DL (ref 70–99)
NT-PROBNP SERPL-MCNC: 188 PG/ML (ref 0–124)
POTASSIUM SERPL-SCNC: 4.7 MMOL/L (ref 3.5–5.1)
SODIUM SERPL-SCNC: 141 MMOL/L (ref 136–145)

## 2023-12-21 PROCEDURE — 83880 ASSAY OF NATRIURETIC PEPTIDE: CPT

## 2023-12-21 PROCEDURE — 96374 THER/PROPH/DIAG INJ IV PUSH: CPT

## 2023-12-21 PROCEDURE — 6360000002 HC RX W HCPCS: Performed by: INTERNAL MEDICINE

## 2023-12-21 PROCEDURE — 80048 BASIC METABOLIC PNL TOTAL CA: CPT

## 2023-12-21 PROCEDURE — 99211 OFF/OP EST MAY X REQ PHY/QHP: CPT

## 2023-12-21 PROCEDURE — 2580000003 HC RX 258: Performed by: INTERNAL MEDICINE

## 2023-12-21 RX ORDER — FUROSEMIDE 10 MG/ML
120 INJECTION INTRAMUSCULAR; INTRAVENOUS ONCE
Start: 2023-12-28 | End: 2023-12-28

## 2023-12-21 RX ORDER — SODIUM CHLORIDE 0.9 % (FLUSH) 0.9 %
5-40 SYRINGE (ML) INJECTION ONCE
Status: COMPLETED | OUTPATIENT
Start: 2023-12-21 | End: 2023-12-21

## 2023-12-21 RX ORDER — FUROSEMIDE 10 MG/ML
120 INJECTION INTRAMUSCULAR; INTRAVENOUS ONCE
Status: COMPLETED | OUTPATIENT
Start: 2023-12-21 | End: 2023-12-21

## 2023-12-21 RX ORDER — SODIUM CHLORIDE 0.9 % (FLUSH) 0.9 %
5-40 SYRINGE (ML) INJECTION ONCE
Start: 2023-12-28 | End: 2023-12-28

## 2023-12-21 RX ADMIN — FUROSEMIDE 120 MG: 10 INJECTION, SOLUTION INTRAMUSCULAR; INTRAVENOUS at 14:13

## 2023-12-21 RX ADMIN — SODIUM CHLORIDE, PRESERVATIVE FREE 10 ML: 5 INJECTION INTRAVENOUS at 14:17

## 2023-12-21 NOTE — TELEPHONE ENCOUNTER
----- Message from Jessa Parsons MD sent at 12/21/2023  4:06 PM EST -----  Please call patient's .  Labs look good.  No changes.  ARETHA

## 2023-12-27 DIAGNOSIS — K44.9 HIATAL HERNIA WITH GERD: ICD-10-CM

## 2023-12-27 DIAGNOSIS — K21.9 HIATAL HERNIA WITH GERD: ICD-10-CM

## 2023-12-27 RX ORDER — OMEPRAZOLE 20 MG/1
CAPSULE, DELAYED RELEASE ORAL
Qty: 180 CAPSULE | Refills: 1 | Status: SHIPPED | OUTPATIENT
Start: 2023-12-27

## 2024-01-04 ENCOUNTER — TELEPHONE (OUTPATIENT)
Dept: CARDIOLOGY CLINIC | Age: 54
End: 2024-01-04

## 2024-01-04 ENCOUNTER — HOSPITAL ENCOUNTER (OUTPATIENT)
Dept: ONCOLOGY | Age: 54
Setting detail: INFUSION SERIES
Discharge: HOME | End: 2024-01-04
Payer: COMMERCIAL

## 2024-01-04 VITALS
SYSTOLIC BLOOD PRESSURE: 157 MMHG | HEART RATE: 86 BPM | TEMPERATURE: 96.8 F | RESPIRATION RATE: 16 BRPM | DIASTOLIC BLOOD PRESSURE: 91 MMHG

## 2024-01-04 DIAGNOSIS — I50.32 CHRONIC HEART FAILURE WITH PRESERVED EJECTION FRACTION (HCC): ICD-10-CM

## 2024-01-04 DIAGNOSIS — I50.32 CHRONIC DIASTOLIC CONGESTIVE HEART FAILURE (HCC): Primary | ICD-10-CM

## 2024-01-04 LAB
ANION GAP SERPL CALCULATED.3IONS-SCNC: 7 MMOL/L (ref 3–16)
BUN SERPL-MCNC: 34 MG/DL (ref 7–20)
CALCIUM SERPL-MCNC: 9.7 MG/DL (ref 8.3–10.6)
CHLORIDE SERPL-SCNC: 97 MMOL/L (ref 99–110)
CO2 SERPL-SCNC: 33 MMOL/L (ref 21–32)
CREAT SERPL-MCNC: 1.4 MG/DL (ref 0.6–1.1)
GFR SERPLBLD CREATININE-BSD FMLA CKD-EPI: 44 ML/MIN/{1.73_M2}
GLUCOSE SERPL-MCNC: 325 MG/DL (ref 70–99)
NT-PROBNP SERPL-MCNC: 565 PG/ML (ref 0–124)
POTASSIUM SERPL-SCNC: 4.7 MMOL/L (ref 3.5–5.1)
SODIUM SERPL-SCNC: 137 MMOL/L (ref 136–145)

## 2024-01-04 PROCEDURE — 99211 OFF/OP EST MAY X REQ PHY/QHP: CPT

## 2024-01-04 PROCEDURE — 2580000003 HC RX 258: Performed by: INTERNAL MEDICINE

## 2024-01-04 PROCEDURE — 96375 TX/PRO/DX INJ NEW DRUG ADDON: CPT

## 2024-01-04 PROCEDURE — 83880 ASSAY OF NATRIURETIC PEPTIDE: CPT

## 2024-01-04 PROCEDURE — 96374 THER/PROPH/DIAG INJ IV PUSH: CPT

## 2024-01-04 PROCEDURE — 80048 BASIC METABOLIC PNL TOTAL CA: CPT

## 2024-01-04 PROCEDURE — 6360000002 HC RX W HCPCS: Performed by: INTERNAL MEDICINE

## 2024-01-04 RX ORDER — SODIUM CHLORIDE 0.9 % (FLUSH) 0.9 %
5-40 SYRINGE (ML) INJECTION ONCE
Start: 2024-01-11 | End: 2024-01-11

## 2024-01-04 RX ORDER — FUROSEMIDE 10 MG/ML
120 INJECTION INTRAMUSCULAR; INTRAVENOUS ONCE
Status: COMPLETED | OUTPATIENT
Start: 2024-01-04 | End: 2024-01-04

## 2024-01-04 RX ORDER — SODIUM CHLORIDE 0.9 % (FLUSH) 0.9 %
5-40 SYRINGE (ML) INJECTION ONCE
Status: COMPLETED | OUTPATIENT
Start: 2024-01-04 | End: 2024-01-04

## 2024-01-04 RX ORDER — FUROSEMIDE 10 MG/ML
120 INJECTION INTRAMUSCULAR; INTRAVENOUS ONCE
Start: 2024-01-11 | End: 2024-01-11

## 2024-01-04 RX ADMIN — FUROSEMIDE 120 MG: 10 INJECTION, SOLUTION INTRAMUSCULAR; INTRAVENOUS at 14:05

## 2024-01-04 RX ADMIN — Medication 10 ML: at 14:05

## 2024-01-04 NOTE — PROGRESS NOTES
Pt ambulatory to infusion center for Lasix IVP. IV access obtained. Labs drawn per orders.  Lasix administered; 120 mg IVP given over 2 minutes. IV removed. Pt/spouse denied need for printed AVS. Pt to return in 2 weeks  for same treatment.  Patient states weight is up today at 208 lb,  left message at Dr. Parsons office already.

## 2024-01-04 NOTE — TELEPHONE ENCOUNTER
Spoke with , he states they have just left the infusion center. He will check weight when they are back home and call us with an update.

## 2024-01-09 ENCOUNTER — TELEPHONE (OUTPATIENT)
Dept: CARDIOLOGY CLINIC | Age: 54
End: 2024-01-09

## 2024-01-09 NOTE — TELEPHONE ENCOUNTER
----- Message from Jessa Parsons MD sent at 1/8/2024  5:41 PM EST -----  Please call patient.  Her labs show that her glucose is very elevated in the 300s.  Kidneys and fluid level are stable.  ARETHA

## 2024-01-11 RX ORDER — ROSUVASTATIN CALCIUM 10 MG/1
10 TABLET, COATED ORAL NIGHTLY
Qty: 90 TABLET | Refills: 0 | OUTPATIENT
Start: 2024-01-11

## 2024-01-17 RX ORDER — ROSUVASTATIN CALCIUM 10 MG/1
10 TABLET, COATED ORAL NIGHTLY
Qty: 90 TABLET | Refills: 0 | OUTPATIENT
Start: 2024-01-17

## 2024-01-18 ENCOUNTER — HOSPITAL ENCOUNTER (OUTPATIENT)
Dept: ONCOLOGY | Age: 54
Setting detail: INFUSION SERIES
Discharge: HOME | End: 2024-01-18
Payer: COMMERCIAL

## 2024-01-18 ENCOUNTER — TELEPHONE (OUTPATIENT)
Dept: CARDIOLOGY CLINIC | Age: 54
End: 2024-01-18

## 2024-01-18 VITALS
TEMPERATURE: 98.1 F | DIASTOLIC BLOOD PRESSURE: 76 MMHG | RESPIRATION RATE: 16 BRPM | HEART RATE: 67 BPM | SYSTOLIC BLOOD PRESSURE: 128 MMHG

## 2024-01-18 DIAGNOSIS — I50.32 CHRONIC HEART FAILURE WITH PRESERVED EJECTION FRACTION (HCC): ICD-10-CM

## 2024-01-18 DIAGNOSIS — I50.32 CHRONIC DIASTOLIC CONGESTIVE HEART FAILURE (HCC): Primary | ICD-10-CM

## 2024-01-18 LAB
ANION GAP SERPL CALCULATED.3IONS-SCNC: 10 MMOL/L (ref 3–16)
BUN SERPL-MCNC: 49 MG/DL (ref 7–20)
CALCIUM SERPL-MCNC: 9 MG/DL (ref 8.3–10.6)
CHLORIDE SERPL-SCNC: 97 MMOL/L (ref 99–110)
CO2 SERPL-SCNC: 33 MMOL/L (ref 21–32)
CREAT SERPL-MCNC: 1.3 MG/DL (ref 0.6–1.1)
GFR SERPLBLD CREATININE-BSD FMLA CKD-EPI: 49 ML/MIN/{1.73_M2}
GLUCOSE SERPL-MCNC: 167 MG/DL (ref 70–99)
NT-PROBNP SERPL-MCNC: 284 PG/ML (ref 0–124)
POTASSIUM SERPL-SCNC: 3.8 MMOL/L (ref 3.5–5.1)
SODIUM SERPL-SCNC: 140 MMOL/L (ref 136–145)

## 2024-01-18 PROCEDURE — 99211 OFF/OP EST MAY X REQ PHY/QHP: CPT

## 2024-01-18 PROCEDURE — 96374 THER/PROPH/DIAG INJ IV PUSH: CPT

## 2024-01-18 PROCEDURE — 83880 ASSAY OF NATRIURETIC PEPTIDE: CPT

## 2024-01-18 PROCEDURE — 2580000003 HC RX 258: Performed by: INTERNAL MEDICINE

## 2024-01-18 PROCEDURE — 80048 BASIC METABOLIC PNL TOTAL CA: CPT

## 2024-01-18 PROCEDURE — 6360000002 HC RX W HCPCS: Performed by: INTERNAL MEDICINE

## 2024-01-18 RX ORDER — FUROSEMIDE 10 MG/ML
120 INJECTION INTRAMUSCULAR; INTRAVENOUS ONCE
Status: COMPLETED | OUTPATIENT
Start: 2024-01-18 | End: 2024-01-18

## 2024-01-18 RX ORDER — FUROSEMIDE 10 MG/ML
120 INJECTION INTRAMUSCULAR; INTRAVENOUS ONCE
Start: 2024-01-25 | End: 2024-01-25

## 2024-01-18 RX ORDER — TORSEMIDE 100 MG/1
50 TABLET ORAL 2 TIMES DAILY
Qty: 35 TABLET | Refills: 6 | Status: SHIPPED | OUTPATIENT
Start: 2024-01-18

## 2024-01-18 RX ORDER — SODIUM CHLORIDE 0.9 % (FLUSH) 0.9 %
5-40 SYRINGE (ML) INJECTION ONCE
Start: 2024-01-25 | End: 2024-01-25

## 2024-01-18 RX ORDER — SODIUM CHLORIDE 0.9 % (FLUSH) 0.9 %
5-40 SYRINGE (ML) INJECTION ONCE
Status: COMPLETED | OUTPATIENT
Start: 2024-01-18 | End: 2024-01-18

## 2024-01-18 RX ADMIN — Medication 10 ML: at 14:21

## 2024-01-18 RX ADMIN — FUROSEMIDE 120 MG: 10 INJECTION, SOLUTION INTRAMUSCULAR; INTRAVENOUS at 14:21

## 2024-01-18 NOTE — TELEPHONE ENCOUNTER
Bonner stopped by the office. He states he has made calls to up concerning Crow's medication change.  Please call him to advise.  Thanks

## 2024-01-18 NOTE — TELEPHONE ENCOUNTER
Pt had labs drawn today    Spoke with ARETHA CARIAS RECS:  Jessa Parsons MD  P [facility] Cardio Heart Failure  Please call patient.  Her labs show that her kidneys are getting \"dry\".  If she is taking 100 mg torsemide am and 50 pm, that is too much.  Needs to cut back to either 100 once a day or 50 bid.  ARETHA    Spoke with Mr. Bonner    Sent a new script to [facility].  Spoke with pharmacist.

## 2024-01-20 PROBLEM — E11.65 POORLY CONTROLLED TYPE 2 DIABETES MELLITUS (HCC): Status: RESOLVED | Noted: 2018-01-12 | Resolved: 2024-01-20

## 2024-01-20 PROBLEM — K83.8 INTRAHEPATIC BILE DUCT DILATION: Status: RESOLVED | Noted: 2020-11-18 | Resolved: 2024-01-20

## 2024-01-20 PROBLEM — K14.6 TONGUE PAIN: Status: RESOLVED | Noted: 2020-02-02 | Resolved: 2024-01-20

## 2024-01-20 PROBLEM — R68.84 JAW PAIN: Status: RESOLVED | Noted: 2019-04-10 | Resolved: 2024-01-20

## 2024-01-20 PROBLEM — R61 EXCESSIVE SWEATING: Status: RESOLVED | Noted: 2020-01-29 | Resolved: 2024-01-20

## 2024-01-20 PROBLEM — B37.0 THRUSH: Status: RESOLVED | Noted: 2020-09-23 | Resolved: 2024-01-20

## 2024-01-20 PROBLEM — Z79.4 TYPE 2 DIABETES MELLITUS WITH HYPERGLYCEMIA, WITH LONG-TERM CURRENT USE OF INSULIN (HCC): Status: ACTIVE | Noted: 2018-01-12

## 2024-01-20 PROBLEM — R60.0 LEG EDEMA, RIGHT: Status: RESOLVED | Noted: 2019-07-10 | Resolved: 2024-01-20

## 2024-01-20 PROBLEM — R11.2 DRUG-INDUCED NAUSEA AND VOMITING: Status: RESOLVED | Noted: 2023-05-22 | Resolved: 2024-01-20

## 2024-01-20 PROBLEM — R23.2 HOT FLASHES: Status: RESOLVED | Noted: 2019-10-21 | Resolved: 2024-01-20

## 2024-01-20 PROBLEM — R25.1 TREMOR: Status: RESOLVED | Noted: 2022-11-11 | Resolved: 2024-01-20

## 2024-01-20 PROBLEM — R35.0 URINARY FREQUENCY: Status: RESOLVED | Noted: 2023-07-10 | Resolved: 2024-01-20

## 2024-01-20 PROBLEM — S13.9XXA NECK SPRAIN: Status: RESOLVED | Noted: 2021-07-26 | Resolved: 2024-01-20

## 2024-01-20 PROBLEM — T50.905A DRUG-INDUCED NAUSEA AND VOMITING: Status: RESOLVED | Noted: 2023-05-22 | Resolved: 2024-01-20

## 2024-01-20 PROBLEM — G47.10 HYPERSOMNOLENCE: Status: RESOLVED | Noted: 2019-01-21 | Resolved: 2024-01-20

## 2024-01-20 PROBLEM — Z71.85 VACCINE COUNSELING: Status: RESOLVED | Noted: 2021-09-05 | Resolved: 2024-01-20

## 2024-01-21 NOTE — PROGRESS NOTES
tablet 1    insulin aspart (NOVOLOG FLEXPEN) 100 UNIT/ML injection pen inject 30 to 50 units into the skin three time daily before meals 60 mL 3    Insulin Degludec (TRESIBA FLEXTOUCH) 200 UNIT/ML SOPN INJECT 200 UNITS SUBCUTANEOUSLY ONE TIME DAILY 54 mL 3    dulaglutide (TRULICITY) 1.5 MG/0.5ML SC injection Inject 0.5 mLs into the skin once a week 4 Adjustable Dose Pre-filled Pen Syringe 4    leflunomide (ARAVA) 20 MG tablet TAKE 1 TABLET BY MOUTH EVERY DAY 30 tablet 0    sulfaSALAzine (AZULFIDINE) 500 MG tablet TAKE 1 TABLET BY MOUTH 2 TIMES DAILY 60 tablet 0    lubiprostone (AMITIZA) 24 MCG capsule TAKE 1 CAPSULE BY MOUTH TWO TIMES A DAY WITH MEALS 180 capsule 1    folic acid (FOLVITE) 1 MG tablet TAKE 1 TABLET BY MOUTH EVERY DAY 90 tablet 1    tamsulosin (FLOMAX) 0.4 MG capsule Take 1 capsule by mouth at bedtime 90 capsule 1    Magic Mouthwash (MIRACLE MOUTHWASH) Swish and spit 5 mLs 4 times daily as needed for Irritation 300 mL 1    nitroGLYCERIN (NITROSTAT) 0.4 MG SL tablet PLACE ONE TABLET UNDER TONGUE AS NEEDED FOR CHEST PAIN, MAY REPEAT EVERY 5 MINUTES AS NEEDED UP TO A MAX OF 3 TABLETS.  IF NO RELIEF AFTER 1 DOSE, CALL 911. 25 tablet 2    ranolazine (RANEXA) 500 MG extended release tablet TAKE 1 TABLET BY MOUTH TWO TIMES A  tablet 3    fluticasone-salmeterol (ADVAIR DISKUS) 250-50 MCG/ACT AEPB diskus inhaler Inhale 1 puff into the lungs 2 times daily 3 each 3    FEROSUL 325 (65 Fe) MG tablet TAKE 1 TABLET BY MOUTH TWO TIMES A DAY WITH MEALS 180 tablet 3    meclizine (ANTIVERT) 25 MG tablet Take 1 tablet by mouth 3 times daily as needed for Dizziness 30 tablet 2    polyethylene glycol (GLYCOLAX) 17 GM/SCOOP powder TAKE 17 GRAMS (1 CAPFUL) BY MOUTH NIGHTLY 510 g 0    clopidogrel (PLAVIX) 75 MG tablet Take 1 tablet by mouth daily 30 tablet 3    spironolactone (ALDACTONE) 25 MG tablet Take 2 tablets by mouth 2 times daily 30 tablet 0    Compression Sleeves Left arm 2 each 0    Elastic Bandages &

## 2024-01-23 ENCOUNTER — TELEPHONE (OUTPATIENT)
Dept: INTERNAL MEDICINE CLINIC | Age: 54
End: 2024-01-23

## 2024-01-23 ENCOUNTER — OFFICE VISIT (OUTPATIENT)
Dept: INTERNAL MEDICINE CLINIC | Age: 54
End: 2024-01-23
Payer: COMMERCIAL

## 2024-01-23 VITALS
WEIGHT: 200.2 LBS | BODY MASS INDEX: 34.36 KG/M2 | DIASTOLIC BLOOD PRESSURE: 72 MMHG | SYSTOLIC BLOOD PRESSURE: 110 MMHG | OXYGEN SATURATION: 97 % | HEART RATE: 63 BPM

## 2024-01-23 DIAGNOSIS — K14.6 TONGUE PAIN: ICD-10-CM

## 2024-01-23 DIAGNOSIS — L85.3 DRY SKIN: ICD-10-CM

## 2024-01-23 DIAGNOSIS — Z85.3 HISTORY OF BREAST CANCER: ICD-10-CM

## 2024-01-23 DIAGNOSIS — F33.2 SEVERE EPISODE OF RECURRENT MAJOR DEPRESSIVE DISORDER, WITHOUT PSYCHOTIC FEATURES (HCC): ICD-10-CM

## 2024-01-23 DIAGNOSIS — E89.89 LYMPHEDEMA OF UPPER EXTREMITY FOLLOWING LYMPHADENECTOMY: Primary | ICD-10-CM

## 2024-01-23 DIAGNOSIS — F41.9 ANXIETY AND DEPRESSION: ICD-10-CM

## 2024-01-23 DIAGNOSIS — Z60.3 IMMIGRANT WITH LANGUAGE DIFFICULTY: ICD-10-CM

## 2024-01-23 DIAGNOSIS — I10 ESSENTIAL HYPERTENSION: Chronic | ICD-10-CM

## 2024-01-23 DIAGNOSIS — I50.9 CHRONIC CONGESTIVE HEART FAILURE, UNSPECIFIED HEART FAILURE TYPE (HCC): ICD-10-CM

## 2024-01-23 DIAGNOSIS — F32.A ANXIETY AND DEPRESSION: ICD-10-CM

## 2024-01-23 DIAGNOSIS — G44.221 CHRONIC TENSION-TYPE HEADACHE, INTRACTABLE: ICD-10-CM

## 2024-01-23 DIAGNOSIS — M06.00 SERONEGATIVE RHEUMATOID ARTHRITIS (HCC): ICD-10-CM

## 2024-01-23 DIAGNOSIS — E66.01 MORBIDLY OBESE (HCC): ICD-10-CM

## 2024-01-23 DIAGNOSIS — Z71.89 COMPLEX CARE COORDINATION: ICD-10-CM

## 2024-01-23 DIAGNOSIS — I89.0 LYMPHEDEMA OF UPPER EXTREMITY FOLLOWING LYMPHADENECTOMY: Primary | ICD-10-CM

## 2024-01-23 PROBLEM — M79.605 PAIN IN BOTH LOWER EXTREMITIES: Status: RESOLVED | Noted: 2019-04-10 | Resolved: 2024-01-23

## 2024-01-23 PROBLEM — M79.604 PAIN IN BOTH LOWER EXTREMITIES: Status: RESOLVED | Noted: 2019-04-10 | Resolved: 2024-01-23

## 2024-01-23 PROBLEM — R42 VERTIGO: Status: RESOLVED | Noted: 2019-05-26 | Resolved: 2024-01-23

## 2024-01-23 PROBLEM — R53.83 FATIGUE: Status: RESOLVED | Noted: 2019-05-26 | Resolved: 2024-01-23

## 2024-01-23 PROCEDURE — 3074F SYST BP LT 130 MM HG: CPT | Performed by: INTERNAL MEDICINE

## 2024-01-23 PROCEDURE — 99214 OFFICE O/P EST MOD 30 MIN: CPT | Performed by: INTERNAL MEDICINE

## 2024-01-23 PROCEDURE — 1036F TOBACCO NON-USER: CPT | Performed by: INTERNAL MEDICINE

## 2024-01-23 PROCEDURE — 3017F COLORECTAL CA SCREEN DOC REV: CPT | Performed by: INTERNAL MEDICINE

## 2024-01-23 PROCEDURE — G8417 CALC BMI ABV UP PARAM F/U: HCPCS | Performed by: INTERNAL MEDICINE

## 2024-01-23 PROCEDURE — G8482 FLU IMMUNIZE ORDER/ADMIN: HCPCS | Performed by: INTERNAL MEDICINE

## 2024-01-23 PROCEDURE — G8427 DOCREV CUR MEDS BY ELIG CLIN: HCPCS | Performed by: INTERNAL MEDICINE

## 2024-01-23 PROCEDURE — 3078F DIAST BP <80 MM HG: CPT | Performed by: INTERNAL MEDICINE

## 2024-01-23 RX ORDER — PETROLATUM 42 G/100G
OINTMENT TOPICAL
Qty: 454 G | Refills: 5 | Status: SHIPPED | OUTPATIENT
Start: 2024-01-23

## 2024-01-23 RX ORDER — BUTALBITAL, ACETAMINOPHEN AND CAFFEINE 50; 325; 40 MG/1; MG/1; MG/1
1 TABLET ORAL EVERY 6 HOURS PRN
Qty: 30 TABLET | Refills: 0 | Status: SHIPPED | OUTPATIENT
Start: 2024-01-23 | End: 2024-01-23

## 2024-01-23 RX ORDER — AMMONIUM LACTATE 12 G/100G
CREAM TOPICAL
Qty: 385 G | Refills: 5 | Status: SHIPPED | OUTPATIENT
Start: 2024-01-23 | End: 2024-01-23 | Stop reason: SDUPTHER

## 2024-01-23 RX ORDER — BUTALBITAL, ACETAMINOPHEN AND CAFFEINE 50; 325; 40 MG/1; MG/1; MG/1
1 TABLET ORAL EVERY 6 HOURS PRN
Qty: 30 TABLET | Refills: 0 | Status: SHIPPED | OUTPATIENT
Start: 2024-01-23

## 2024-01-23 RX ORDER — AMMONIUM LACTATE 12 G/100G
CREAM TOPICAL
Qty: 385 G | Refills: 5 | Status: SHIPPED | OUTPATIENT
Start: 2024-01-23 | End: 2024-02-22

## 2024-01-23 SDOH — SOCIAL STABILITY - SOCIAL INSECURITY: ACCULTURATION DIFFICULTY: Z60.3

## 2024-01-23 ASSESSMENT — PATIENT HEALTH QUESTIONNAIRE - PHQ9
9. THOUGHTS THAT YOU WOULD BE BETTER OFF DEAD, OR OF HURTING YOURSELF: 1
3. TROUBLE FALLING OR STAYING ASLEEP: 0
7. TROUBLE CONCENTRATING ON THINGS, SUCH AS READING THE NEWSPAPER OR WATCHING TELEVISION: 2
SUM OF ALL RESPONSES TO PHQ QUESTIONS 1-9: 13
5. POOR APPETITE OR OVEREATING: 1
1. LITTLE INTEREST OR PLEASURE IN DOING THINGS: 3
4. FEELING TIRED OR HAVING LITTLE ENERGY: 1
SUM OF ALL RESPONSES TO PHQ9 QUESTIONS 1 & 2: 4
SUM OF ALL RESPONSES TO PHQ QUESTIONS 1-9: 14
8. MOVING OR SPEAKING SO SLOWLY THAT OTHER PEOPLE COULD HAVE NOTICED. OR THE OPPOSITE, BEING SO FIGETY OR RESTLESS THAT YOU HAVE BEEN MOVING AROUND A LOT MORE THAN USUAL: 3
10. IF YOU CHECKED OFF ANY PROBLEMS, HOW DIFFICULT HAVE THESE PROBLEMS MADE IT FOR YOU TO DO YOUR WORK, TAKE CARE OF THINGS AT HOME, OR GET ALONG WITH OTHER PEOPLE: 1
6. FEELING BAD ABOUT YOURSELF - OR THAT YOU ARE A FAILURE OR HAVE LET YOURSELF OR YOUR FAMILY DOWN: 2
2. FEELING DOWN, DEPRESSED OR HOPELESS: 1

## 2024-01-23 ASSESSMENT — ENCOUNTER SYMPTOMS
CHEST TIGHTNESS: 0
SHORTNESS OF BREATH: 0
DIARRHEA: 0

## 2024-01-23 ASSESSMENT — COLUMBIA-SUICIDE SEVERITY RATING SCALE - C-SSRS
2. HAVE YOU ACTUALLY HAD ANY THOUGHTS OF KILLING YOURSELF?: NO
6. HAVE YOU EVER DONE ANYTHING, STARTED TO DO ANYTHING, OR PREPARED TO DO ANYTHING TO END YOUR LIFE?: NO
1. WITHIN THE PAST MONTH, HAVE YOU WISHED YOU WERE DEAD OR WISHED YOU COULD GO TO SLEEP AND NOT WAKE UP?: YES

## 2024-01-23 NOTE — TELEPHONE ENCOUNTER
[facility] calling about the Ammonium Lactate ----they need frequency in directions---please call them at [phone].  Thanks.

## 2024-01-24 NOTE — ASSESSMENT & PLAN NOTE
Patient with worsening lymphedema of her left hand with increase in fluid retention over the past few days.  She states when her weight increases, bring the lymphedema pump on her arm does not help to reduce the fluid.

## 2024-01-24 NOTE — ASSESSMENT & PLAN NOTE
Patient with multiple specialist, notes reviewed.  Patient with multiple complex health problems.

## 2024-01-24 NOTE — ASSESSMENT & PLAN NOTE
Follows with psychiatry.  PHQ-9 was very positive but patient actually is feeling better from a mood standpoint as she recently became a grandmother and recently found out that her daughter is also expecting a baby that is due in August.

## 2024-01-24 NOTE — ASSESSMENT & PLAN NOTE
Blood pressure is well-controlled.  She remains on metoprolol 25 mg twice daily, Demadex 100 mg daily and spironolactone 50 mg daily.   maximum assist (25% patients effort) maximum assist (25% patients effort)

## 2024-01-24 NOTE — ASSESSMENT & PLAN NOTE
Follows with Dr. Trevizo for cardiology.  Torsemide dose was recently decreased from 100 mg a.m./50 mg p.m. to 100 mg daily (or 50 mg twice daily) due to elevated BUN.  She is also struggling with increasing fluid.  She is getting Lasix infusions once weekly every 2 weeks which does help to maintain her fluid status.

## 2024-01-24 NOTE — ASSESSMENT & PLAN NOTE
Following with Dr. Moreira for oncology.  Status postmastectomy with lymph node dissection, chemotherapy, radiation therapy, and tamoxifen.

## 2024-01-25 DIAGNOSIS — E11.59 TYPE 2 DIABETES MELLITUS WITH OTHER CIRCULATORY COMPLICATION, WITH LONG-TERM CURRENT USE OF INSULIN (HCC): ICD-10-CM

## 2024-01-25 DIAGNOSIS — K14.6 TONGUE PAIN: ICD-10-CM

## 2024-01-25 DIAGNOSIS — E03.2 HYPOTHYROIDISM DUE TO MEDICATION: ICD-10-CM

## 2024-01-25 DIAGNOSIS — Z79.4 TYPE 2 DIABETES MELLITUS WITH OTHER CIRCULATORY COMPLICATION, WITH LONG-TERM CURRENT USE OF INSULIN (HCC): ICD-10-CM

## 2024-01-25 DIAGNOSIS — I25.10 CORONARY ARTERY DISEASE INVOLVING NATIVE CORONARY ARTERY OF NATIVE HEART WITHOUT ANGINA PECTORIS: ICD-10-CM

## 2024-01-25 LAB
ALBUMIN SERPL-MCNC: 4.1 G/DL (ref 3.4–5)
ALBUMIN/GLOB SERPL: 1.9 {RATIO} (ref 1.1–2.2)
ALP SERPL-CCNC: 100 U/L (ref 40–129)
ALT SERPL-CCNC: 9 U/L (ref 10–40)
ANION GAP SERPL CALCULATED.3IONS-SCNC: 9 MMOL/L (ref 3–16)
AST SERPL-CCNC: 10 U/L (ref 15–37)
BILIRUB SERPL-MCNC: <0.2 MG/DL (ref 0–1)
BUN SERPL-MCNC: 42 MG/DL (ref 7–20)
CALCIUM SERPL-MCNC: 8.9 MG/DL (ref 8.3–10.6)
CHLORIDE SERPL-SCNC: 98 MMOL/L (ref 99–110)
CHOLEST SERPL-MCNC: 185 MG/DL (ref 0–199)
CO2 SERPL-SCNC: 31 MMOL/L (ref 21–32)
CREAT SERPL-MCNC: 1.2 MG/DL (ref 0.6–1.1)
CREAT UR-MCNC: 52.4 MG/DL (ref 28–259)
FOLATE SERPL-MCNC: 11.44 NG/ML (ref 4.78–24.2)
GFR SERPLBLD CREATININE-BSD FMLA CKD-EPI: 54 ML/MIN/{1.73_M2}
GLUCOSE SERPL-MCNC: 161 MG/DL (ref 70–99)
HDLC SERPL-MCNC: 47 MG/DL (ref 40–60)
LDLC SERPL CALC-MCNC: 88 MG/DL
MICROALBUMIN UR DL<=1MG/L-MCNC: <1.2 MG/DL
MICROALBUMIN/CREAT UR: NORMAL MG/G (ref 0–30)
POTASSIUM SERPL-SCNC: 4.8 MMOL/L (ref 3.5–5.1)
PROT SERPL-MCNC: 6.3 G/DL (ref 6.4–8.2)
SODIUM SERPL-SCNC: 138 MMOL/L (ref 136–145)
TRIGL SERPL-MCNC: 248 MG/DL (ref 0–150)
TSH SERPL DL<=0.005 MIU/L-ACNC: 1.92 UIU/ML (ref 0.27–4.2)
VIT B12 SERPL-MCNC: 284 PG/ML (ref 211–911)
VLDLC SERPL CALC-MCNC: 50 MG/DL

## 2024-01-26 LAB
EST. AVERAGE GLUCOSE BLD GHB EST-MCNC: 165.7 MG/DL
HBA1C MFR BLD: 7.4 %

## 2024-01-29 RX ORDER — ROSUVASTATIN CALCIUM 10 MG/1
10 TABLET, COATED ORAL NIGHTLY
Qty: 90 TABLET | Refills: 0 | OUTPATIENT
Start: 2024-01-29

## 2024-01-29 RX ORDER — LUBIPROSTONE 24 UG/1
CAPSULE ORAL
Qty: 180 CAPSULE | Refills: 1 | Status: SHIPPED | OUTPATIENT
Start: 2024-01-29

## 2024-01-31 ENCOUNTER — HOSPITAL ENCOUNTER (OUTPATIENT)
Dept: ONCOLOGY | Age: 54
Setting detail: INFUSION SERIES
Discharge: HOME | End: 2024-01-31
Payer: COMMERCIAL

## 2024-01-31 VITALS
RESPIRATION RATE: 16 BRPM | HEART RATE: 72 BPM | TEMPERATURE: 98.5 F | DIASTOLIC BLOOD PRESSURE: 78 MMHG | SYSTOLIC BLOOD PRESSURE: 150 MMHG

## 2024-01-31 DIAGNOSIS — I50.32 CHRONIC DIASTOLIC CONGESTIVE HEART FAILURE (HCC): Primary | ICD-10-CM

## 2024-01-31 DIAGNOSIS — I50.32 CHRONIC HEART FAILURE WITH PRESERVED EJECTION FRACTION (HCC): ICD-10-CM

## 2024-01-31 LAB
ANION GAP SERPL CALCULATED.3IONS-SCNC: 9 MMOL/L (ref 3–16)
BUN SERPL-MCNC: 40 MG/DL (ref 7–20)
CALCIUM SERPL-MCNC: 8.9 MG/DL (ref 8.3–10.6)
CHLORIDE SERPL-SCNC: 101 MMOL/L (ref 99–110)
CO2 SERPL-SCNC: 31 MMOL/L (ref 21–32)
CREAT SERPL-MCNC: 1.2 MG/DL (ref 0.6–1.1)
GFR SERPLBLD CREATININE-BSD FMLA CKD-EPI: 53 ML/MIN/{1.73_M2}
GLUCOSE SERPL-MCNC: 75 MG/DL (ref 70–99)
NT-PROBNP SERPL-MCNC: 456 PG/ML (ref 0–124)
POTASSIUM SERPL-SCNC: 3.9 MMOL/L (ref 3.5–5.1)
SODIUM SERPL-SCNC: 141 MMOL/L (ref 136–145)

## 2024-01-31 PROCEDURE — 6360000002 HC RX W HCPCS: Performed by: INTERNAL MEDICINE

## 2024-01-31 PROCEDURE — 96374 THER/PROPH/DIAG INJ IV PUSH: CPT

## 2024-01-31 PROCEDURE — 80048 BASIC METABOLIC PNL TOTAL CA: CPT

## 2024-01-31 PROCEDURE — 2580000003 HC RX 258: Performed by: INTERNAL MEDICINE

## 2024-01-31 PROCEDURE — 83880 ASSAY OF NATRIURETIC PEPTIDE: CPT

## 2024-01-31 PROCEDURE — 99211 OFF/OP EST MAY X REQ PHY/QHP: CPT

## 2024-01-31 RX ORDER — ROSUVASTATIN CALCIUM 10 MG/1
10 TABLET, COATED ORAL NIGHTLY
Qty: 90 TABLET | Refills: 0 | OUTPATIENT
Start: 2024-01-31

## 2024-01-31 RX ORDER — SODIUM CHLORIDE 0.9 % (FLUSH) 0.9 %
5-40 SYRINGE (ML) INJECTION ONCE
Start: 2024-02-01 | End: 2024-02-01

## 2024-01-31 RX ORDER — FUROSEMIDE 10 MG/ML
120 INJECTION INTRAMUSCULAR; INTRAVENOUS ONCE
Start: 2024-02-01 | End: 2024-02-01

## 2024-01-31 RX ORDER — FUROSEMIDE 10 MG/ML
120 INJECTION INTRAMUSCULAR; INTRAVENOUS ONCE
Status: COMPLETED | OUTPATIENT
Start: 2024-01-31 | End: 2024-01-31

## 2024-01-31 RX ORDER — TAMSULOSIN HYDROCHLORIDE 0.4 MG/1
0.4 CAPSULE ORAL NIGHTLY
Qty: 90 CAPSULE | Refills: 1 | Status: SHIPPED | OUTPATIENT
Start: 2024-01-31

## 2024-01-31 RX ORDER — SODIUM CHLORIDE 0.9 % (FLUSH) 0.9 %
5-40 SYRINGE (ML) INJECTION ONCE
Status: COMPLETED | OUTPATIENT
Start: 2024-01-31 | End: 2024-01-31

## 2024-01-31 RX ADMIN — FUROSEMIDE 120 MG: 10 INJECTION, SOLUTION INTRAMUSCULAR; INTRAVENOUS at 14:28

## 2024-01-31 RX ADMIN — Medication 10 ML: at 14:27

## 2024-02-01 ENCOUNTER — OFFICE VISIT (OUTPATIENT)
Dept: ENDOCRINOLOGY | Age: 54
End: 2024-02-01
Payer: COMMERCIAL

## 2024-02-01 ENCOUNTER — TELEPHONE (OUTPATIENT)
Dept: CARDIOLOGY CLINIC | Age: 54
End: 2024-02-01

## 2024-02-01 VITALS
HEART RATE: 80 BPM | HEIGHT: 64 IN | WEIGHT: 202 LBS | BODY MASS INDEX: 34.49 KG/M2 | SYSTOLIC BLOOD PRESSURE: 134 MMHG | DIASTOLIC BLOOD PRESSURE: 71 MMHG

## 2024-02-01 DIAGNOSIS — Z79.4 TYPE 2 DIABETES MELLITUS WITH HYPERGLYCEMIA, WITH LONG-TERM CURRENT USE OF INSULIN (HCC): Primary | ICD-10-CM

## 2024-02-01 DIAGNOSIS — I25.10 CORONARY ARTERY DISEASE INVOLVING NATIVE CORONARY ARTERY OF NATIVE HEART WITHOUT ANGINA PECTORIS: ICD-10-CM

## 2024-02-01 DIAGNOSIS — E11.65 TYPE 2 DIABETES MELLITUS WITH HYPERGLYCEMIA, WITH LONG-TERM CURRENT USE OF INSULIN (HCC): Primary | ICD-10-CM

## 2024-02-01 DIAGNOSIS — I10 ESSENTIAL HYPERTENSION: Chronic | ICD-10-CM

## 2024-02-01 DIAGNOSIS — Z86.73 H/O TIA (TRANSIENT ISCHEMIC ATTACK) AND STROKE: ICD-10-CM

## 2024-02-01 PROCEDURE — G8482 FLU IMMUNIZE ORDER/ADMIN: HCPCS | Performed by: INTERNAL MEDICINE

## 2024-02-01 PROCEDURE — G8417 CALC BMI ABV UP PARAM F/U: HCPCS | Performed by: INTERNAL MEDICINE

## 2024-02-01 PROCEDURE — 3078F DIAST BP <80 MM HG: CPT | Performed by: INTERNAL MEDICINE

## 2024-02-01 PROCEDURE — 1036F TOBACCO NON-USER: CPT | Performed by: INTERNAL MEDICINE

## 2024-02-01 PROCEDURE — 99214 OFFICE O/P EST MOD 30 MIN: CPT | Performed by: INTERNAL MEDICINE

## 2024-02-01 PROCEDURE — 3075F SYST BP GE 130 - 139MM HG: CPT | Performed by: INTERNAL MEDICINE

## 2024-02-01 PROCEDURE — 3017F COLORECTAL CA SCREEN DOC REV: CPT | Performed by: INTERNAL MEDICINE

## 2024-02-01 PROCEDURE — G8427 DOCREV CUR MEDS BY ELIG CLIN: HCPCS | Performed by: INTERNAL MEDICINE

## 2024-02-01 PROCEDURE — 2022F DILAT RTA XM EVC RTNOPTHY: CPT | Performed by: INTERNAL MEDICINE

## 2024-02-01 PROCEDURE — 3051F HG A1C>EQUAL 7.0%<8.0%: CPT | Performed by: INTERNAL MEDICINE

## 2024-02-01 RX ORDER — INSULIN DEGLUDEC 200 U/ML
INJECTION, SOLUTION SUBCUTANEOUS
Qty: 54 ML | Refills: 3 | Status: SHIPPED | OUTPATIENT
Start: 2024-02-01

## 2024-02-01 NOTE — TELEPHONE ENCOUNTER
----- Message from Jessa Parsons MD sent at 1/31/2024  3:25 PM EST -----  Call patient and let her know that her labs look okay.  Fluid level is stable.  ARETHA

## 2024-02-01 NOTE — PROGRESS NOTES
(CERVIX STATUS UNKNOWN)  2005    USO    MASTECTOMY      TONSILLECTOMY      UPPER GASTROINTESTINAL ENDOSCOPY  2019    stretched esophagous      Social History     Socioeconomic History    Marital status:      Spouse name: Janel    Number of children: 3    Years of education: Not on file    Highest education level: Not on file   Occupational History    Occupation: disabled   Tobacco Use    Smoking status: Never    Smokeless tobacco: Never   Vaping Use    Vaping Use: Never used   Substance and Sexual Activity    Alcohol use: No    Drug use: No    Sexual activity: Not Currently   Other Topics Concern    Not on file   Social History Narrative    Not on file     Social Determinants of Health     Financial Resource Strain: Low Risk  (2023)    Overall Financial Resource Strain (CARDIA)     Difficulty of Paying Living Expenses: Not hard at all   Food Insecurity: Not on file (2023)   Transportation Needs: Unknown (2023)    PRAPARE - Transportation     Lack of Transportation (Medical): Not on file     Lack of Transportation (Non-Medical): No   Physical Activity: Not on file   Stress: Not on file   Social Connections: Not on file   Intimate Partner Violence: Not on file   Housing Stability: Unknown (2023)    Housing Stability Vital Sign     Unable to Pay for Housing in the Last Year: Not on file     Number of Places Lived in the Last Year: Not on file     Unstable Housing in the Last Year: No     Family History   Problem Relation Age of Onset    Asthma Other     Cancer Other     Depression Other     Diabetes Other     Hypertension Other     High Cholesterol Other     Migraines Other     Heart Attack Father 65         of MI    High Blood Pressure Mother     Diabetes Mother      Current Outpatient Medications   Medication Sig Dispense Refill    Insulin Degludec (TRESIBA FLEXTOUCH) 200 UNIT/ML SOPN INJECT 200 UNITS SUBCUTANEOUSLY ONE TIME DAILY 54 mL 3    tamsulosin (FLOMAX) 0.4 MG capsule TAKE 1

## 2024-02-01 NOTE — PATIENT INSTRUCTIONS
Patient Education        Hypoglycemia: Care Instructions  Overview     Hypoglycemia means that your blood sugar is low and your body is not getting enough fuel. Some people get low blood sugar from not eating often enough. Some medicines to treat diabetes can cause low blood sugar. People who have had surgery on their stomachs or intestines may get hypoglycemia. Problems with the pancreas, kidneys, or liver also can cause low blood sugar.  A snack or drink with sugar in it will raise your blood sugar and should ease your symptoms right away.  Your doctor may recommend that you change or stop your medicines until you can get your blood sugar levels under control. In the long run, you may need to change your diet and eating habits so that you get enough fuel for your body throughout the day.  Follow-up care is a key part of your treatment and safety. Be sure to make and go to all appointments, and call your doctor if you are having problems. It's also a good idea to know your test results and keep a list of the medicines you take.  How can you care for yourself at home?  Learn your signs of low blood sugar. They are different for everyone. Some common early signs include:  Nausea.  Hunger.  Feeling nervous, irritable, or shaky.  Cold, clammy skin.  Sweating (when you're not exercising).  Use the \"rule of 15\" to treat low blood sugar. This includes eating 15 grams of carbohydrate from a quick-sugar food, such as 3 or 4 glucose tablets or ½ cup of juice. Wait 15 minutes and check your blood sugar. If it is still below 70 mg/dL, eat another 15 grams of carbohydrate. Repeat this every 15 minutes until your blood sugar is in a safe target range.  Once your blood sugar is in a safe range, eat a snack or meal to prevent recurrent low blood sugar.  Make sure family, friends, and coworkers know the symptoms of low blood sugar and know how to get your sugar level up.  If you were prescribed glucagon, always have it with you.

## 2024-02-07 DIAGNOSIS — J45.30 MILD PERSISTENT ASTHMA WITHOUT COMPLICATION: ICD-10-CM

## 2024-02-07 RX ORDER — MONTELUKAST SODIUM 10 MG/1
10 TABLET ORAL
Qty: 30 TABLET | Refills: 0 | Status: SHIPPED | OUTPATIENT
Start: 2024-02-07

## 2024-02-07 RX ORDER — FOLIC ACID 1 MG/1
TABLET ORAL
Qty: 90 TABLET | Refills: 1 | Status: SHIPPED | OUTPATIENT
Start: 2024-02-07

## 2024-02-07 NOTE — TELEPHONE ENCOUNTER
Last appointment:  9/18/2023    Next appointment:  3/18/2024    Last refill:     montelukast (SINGULAIR) 10 MG tablet [[id number]]    Order Details  Dose: 10 mg Route: Oral Frequency: --   Dispense Quantity: 30 tablet Refills: 3          Sig: TAKE 1 TABLET BY MOUTH AT NIGHT         Start Date: 09/27/23

## 2024-02-12 ENCOUNTER — OFFICE VISIT (OUTPATIENT)
Dept: CARDIOLOGY CLINIC | Age: 54
End: 2024-02-12

## 2024-02-12 VITALS
WEIGHT: 210 LBS | OXYGEN SATURATION: 92 % | HEIGHT: 64 IN | BODY MASS INDEX: 35.85 KG/M2 | DIASTOLIC BLOOD PRESSURE: 58 MMHG | SYSTOLIC BLOOD PRESSURE: 118 MMHG | HEART RATE: 68 BPM

## 2024-02-12 DIAGNOSIS — R06.02 SOB (SHORTNESS OF BREATH): ICD-10-CM

## 2024-02-12 DIAGNOSIS — I89.0 LYMPHEDEMA OF UPPER EXTREMITY FOLLOWING LYMPHADENECTOMY: ICD-10-CM

## 2024-02-12 DIAGNOSIS — I10 ESSENTIAL HYPERTENSION: ICD-10-CM

## 2024-02-12 DIAGNOSIS — N28.9 RENAL INSUFFICIENCY: ICD-10-CM

## 2024-02-12 DIAGNOSIS — I25.10 CORONARY ARTERY DISEASE INVOLVING NATIVE CORONARY ARTERY OF NATIVE HEART WITHOUT ANGINA PECTORIS: ICD-10-CM

## 2024-02-12 DIAGNOSIS — E89.89 LYMPHEDEMA OF UPPER EXTREMITY FOLLOWING LYMPHADENECTOMY: ICD-10-CM

## 2024-02-12 DIAGNOSIS — I50.32 CHRONIC HEART FAILURE WITH PRESERVED EJECTION FRACTION (HCC): Primary | ICD-10-CM

## 2024-02-12 RX ORDER — LANOLIN ALCOHOL/MO/W.PET/CERES
1000 CREAM (GRAM) TOPICAL DAILY
COMMUNITY

## 2024-02-12 RX ORDER — TORSEMIDE 100 MG/1
TABLET ORAL
Qty: 35 TABLET | Refills: 6
Start: 2024-02-12

## 2024-02-12 NOTE — PROGRESS NOTES
with an   estimated ejection fraction of 55-60%.   No regional wall motion abnormalities are noted.   Normal diastolic function. E/e' = 9.6.   The right ventricle is normal in size and function.   Trivial tricuspid regurgitation. RVSP = 30-35 mmHg.   A bubble study was performed and fails to show evidence of shunting.       Echo 10/3/22  Normal left ventricle size, wall thickness and systolic function with an   estimated ejection fraction of 55-60%.   No regional wall motion abnormalities are seen.   Diastolic filling parameters suggests grade I diastolic dysfunction.   Mild mitral regurgitation.   Unable to estimate pulmonary artery pressure secondary to incomplete TR jet   envelope.    Stress Test 7-1-2020   There is normal isotope uptake at stress and rest. There is no evidence of     myocardial ischemia or scar.     Normal LV function.     Left ventricular ejection fraction of 59 %.     Overall findings represent a low risk scan.         Stress Protocols          Resting ECG     Normal sinus rhythm.        Left Heart Cath 2/27/18:  Dominance : Right     LM: bifurcating, MLI  LAD: mild plaquing with small vessel disease distally   LCx: no significant disease  RCA: MLI     LVEDP: 25 mmHg   No Ao gradient     Right Heart Cath   RA: 13  RV: 47/6/16  PA:   46/19    and mean of 32  PCWP: 21 mmHg      Sats:  Ao: 92%  RA: 61%  PA: 62% (x 2)   CO/CI : 6.1 L    CXR 1/15/18:  No acute cardiopulmonary disease     Echo 11/8/2017:  Normal left ventricle size and systolic function with an estimated ejection   fraction of 60%. No regional wall motion abnormalities are seen.   There is borderline concentric left ventricular hypertrophy.   E/e\"= 13     Stress test 11/8/2017:  There is normal isotope uptake at stress and rest. There is no evidence of  myocardial ischemia or scar.  Normal LV function.  Overall findings represent a low risk scan.       VQ scan negative 1/11/2018    CXR 1/16/18  No acute cardiopulmonary disease

## 2024-02-12 NOTE — PATIENT INSTRUCTIONS
PLAN:  All cardiac test and lab results personally reviewed by me during this office visit.   Take Torsemide 50mg twice a day on the 3 days following each Lasix IV infusion.  Then Take 100mg in the AM and 50mg in the PM on ALL other days.         Today take an EXTRA Torsemide 50mg   Please Move your afternoon Torsemide Dose to 2pm.   4.    Plan Echo in March on a day when here for Lasix Infusion.        5.   Call if No Improvement.

## 2024-02-14 ENCOUNTER — TELEPHONE (OUTPATIENT)
Dept: ENDOCRINOLOGY | Age: 54
End: 2024-02-14

## 2024-02-14 DIAGNOSIS — Z79.4 TYPE 2 DIABETES MELLITUS WITH HYPERGLYCEMIA, WITH LONG-TERM CURRENT USE OF INSULIN (HCC): Primary | ICD-10-CM

## 2024-02-14 DIAGNOSIS — E11.65 TYPE 2 DIABETES MELLITUS WITH HYPERGLYCEMIA, WITH LONG-TERM CURRENT USE OF INSULIN (HCC): Primary | ICD-10-CM

## 2024-02-15 ENCOUNTER — TELEPHONE (OUTPATIENT)
Dept: ENDOCRINOLOGY | Age: 54
End: 2024-02-15

## 2024-02-15 ENCOUNTER — HOSPITAL ENCOUNTER (OUTPATIENT)
Dept: ONCOLOGY | Age: 54
Setting detail: INFUSION SERIES
Discharge: HOME | End: 2024-02-15
Payer: COMMERCIAL

## 2024-02-15 VITALS
TEMPERATURE: 98 F | HEART RATE: 72 BPM | DIASTOLIC BLOOD PRESSURE: 83 MMHG | SYSTOLIC BLOOD PRESSURE: 121 MMHG | RESPIRATION RATE: 16 BRPM

## 2024-02-15 DIAGNOSIS — I50.32 CHRONIC HEART FAILURE WITH PRESERVED EJECTION FRACTION (HCC): ICD-10-CM

## 2024-02-15 DIAGNOSIS — I50.32 CHRONIC DIASTOLIC CONGESTIVE HEART FAILURE (HCC): Primary | ICD-10-CM

## 2024-02-15 LAB
ANION GAP SERPL CALCULATED.3IONS-SCNC: 10 MMOL/L (ref 3–16)
BUN SERPL-MCNC: 39 MG/DL (ref 7–20)
CALCIUM SERPL-MCNC: 9.5 MG/DL (ref 8.3–10.6)
CHLORIDE SERPL-SCNC: 99 MMOL/L (ref 99–110)
CO2 SERPL-SCNC: 33 MMOL/L (ref 21–32)
CREAT SERPL-MCNC: 1.3 MG/DL (ref 0.6–1.1)
GFR SERPLBLD CREATININE-BSD FMLA CKD-EPI: 49 ML/MIN/{1.73_M2}
GLUCOSE SERPL-MCNC: 121 MG/DL (ref 70–99)
NT-PROBNP SERPL-MCNC: 368 PG/ML (ref 0–124)
POTASSIUM SERPL-SCNC: 4 MMOL/L (ref 3.5–5.1)
SODIUM SERPL-SCNC: 142 MMOL/L (ref 136–145)

## 2024-02-15 PROCEDURE — 96374 THER/PROPH/DIAG INJ IV PUSH: CPT

## 2024-02-15 PROCEDURE — 6360000002 HC RX W HCPCS: Performed by: INTERNAL MEDICINE

## 2024-02-15 PROCEDURE — 2580000003 HC RX 258: Performed by: INTERNAL MEDICINE

## 2024-02-15 PROCEDURE — 83880 ASSAY OF NATRIURETIC PEPTIDE: CPT

## 2024-02-15 PROCEDURE — 99211 OFF/OP EST MAY X REQ PHY/QHP: CPT

## 2024-02-15 PROCEDURE — 80048 BASIC METABOLIC PNL TOTAL CA: CPT

## 2024-02-15 RX ORDER — SODIUM CHLORIDE 0.9 % (FLUSH) 0.9 %
5-40 SYRINGE (ML) INJECTION ONCE
Start: 2024-02-22 | End: 2024-02-22

## 2024-02-15 RX ORDER — SODIUM CHLORIDE 0.9 % (FLUSH) 0.9 %
5-40 SYRINGE (ML) INJECTION ONCE
Status: COMPLETED | OUTPATIENT
Start: 2024-02-15 | End: 2024-02-15

## 2024-02-15 RX ORDER — FUROSEMIDE 10 MG/ML
120 INJECTION INTRAMUSCULAR; INTRAVENOUS ONCE
Status: COMPLETED | OUTPATIENT
Start: 2024-02-15 | End: 2024-02-15

## 2024-02-15 RX ORDER — FUROSEMIDE 10 MG/ML
120 INJECTION INTRAMUSCULAR; INTRAVENOUS ONCE
Start: 2024-02-22 | End: 2024-02-22

## 2024-02-15 RX ADMIN — Medication 10 ML: at 14:22

## 2024-02-15 RX ADMIN — FUROSEMIDE 120 MG: 10 INJECTION, SOLUTION INTRAMUSCULAR; INTRAVENOUS at 14:22

## 2024-02-15 NOTE — TELEPHONE ENCOUNTER
Submitted PA for Mounjaro  Via [facility] Key: [id number] STATUS: PENDING.    Follow up done daily; if no decision with in three days we will refax.  If another three days goes by with no decision will call the insurance for status.

## 2024-02-15 NOTE — PROGRESS NOTES
Pt ambulatory to infusion center for Lasix IVP. IV access obtained. Labs drawn per orders. IVP Lasix administered; 120 mg IVP given over 10 minutes. IV removed. AVS printed and reviewed. Pt to return in 2 weeks  for same treatment.

## 2024-02-15 NOTE — TELEPHONE ENCOUNTER
Submitted PA for Tresiba  Via [facility] Key: [id number] STATUS: PENDING.    Follow up done daily; if no decision with in three days we will refax.  If another three days goes by with no decision will call the insurance for status.

## 2024-02-16 ENCOUNTER — TELEPHONE (OUTPATIENT)
Dept: CARDIOLOGY CLINIC | Age: 54
End: 2024-02-16

## 2024-02-16 NOTE — TELEPHONE ENCOUNTER
Mounjaro denied. Please advise if you would like to attempt an appeal, and if so need rationale for appeal.

## 2024-02-16 NOTE — TELEPHONE ENCOUNTER
----- Message from Vivian Jones MA sent at 2/16/2024  3:35 PM EST -----    ----- Message -----  From: Jessa Parsons MD  Sent: 2/15/2024   4:43 PM EST  To: [facility] Cardio Heart Failure    Please call patient.  Her BNP level is lower.  Kidneys look okay.  Continue the plan we discussed in office.  ARETHA

## 2024-02-19 NOTE — TELEPHONE ENCOUNTER
Yes okay to switch Tresiba to formulary alternative and if they are not covering Mounjaro it can be switched to Ozempic

## 2024-02-20 RX ORDER — SEMAGLUTIDE 0.68 MG/ML
INJECTION, SOLUTION SUBCUTANEOUS
Qty: 3 ML | Refills: 1 | Status: SHIPPED | OUTPATIENT
Start: 2024-02-20

## 2024-02-20 RX ORDER — INSULIN DETEMIR 100 [IU]/ML
INJECTION, SOLUTION SUBCUTANEOUS
Qty: 20 ADJUSTABLE DOSE PRE-FILLED PEN SYRINGE | Refills: 3 | Status: SHIPPED | OUTPATIENT
Start: 2024-02-20

## 2024-02-21 NOTE — TELEPHONE ENCOUNTER
As per my note   She has tried Trulicity for more than 6 months and Aic still above 7   She has also tried jardiance which was not approved and hence she is on Steglatro so she meets the criteria to get it approved

## 2024-02-22 NOTE — TELEPHONE ENCOUNTER
There was a PA received for Mounjaro, but there is not a current RX on file.  I see a script for Ozempic. Please advise.     If you want PA please submit new RX, and resend this PA request back to the pool    If this requires a response please respond to the pool ( P [facility] MEDICATION PRE-AUTH).      Thank you please advise patient.

## 2024-02-28 ENCOUNTER — TELEPHONE (OUTPATIENT)
Dept: ENDOCRINOLOGY | Age: 54
End: 2024-02-28

## 2024-02-28 NOTE — TELEPHONE ENCOUNTER
We switched from Tresiba to Levemir per formulary change after Tresiba PA was denied. Would you like to try again with new information?

## 2024-02-28 NOTE — TELEPHONE ENCOUNTER
Please advise patient to increase her Levemir dose by 4 units and see if that brings down her glucose and if still stays high she needs to send me her glucose log so we can further increase the dose of Levemir.  Specially where her insurance is not covering the Tresiba anymore.

## 2024-02-28 NOTE — TELEPHONE ENCOUNTER
Call from pt stating that he has been taking his Rx Levemir for a week     Pt stated that after taking his Levemir his BS is still high he stated that the  Levemir is not helping bring his BS down     Pt is requesting a call back from Dr. Taylor or the nurse     Please advise   CB# [phone]

## 2024-02-29 ENCOUNTER — HOSPITAL ENCOUNTER (OUTPATIENT)
Dept: ONCOLOGY | Age: 54
Setting detail: INFUSION SERIES
Discharge: HOME | End: 2024-02-29
Payer: COMMERCIAL

## 2024-02-29 VITALS
RESPIRATION RATE: 16 BRPM | TEMPERATURE: 96.5 F | HEART RATE: 67 BPM | SYSTOLIC BLOOD PRESSURE: 132 MMHG | DIASTOLIC BLOOD PRESSURE: 74 MMHG

## 2024-02-29 DIAGNOSIS — I50.32 CHRONIC HEART FAILURE WITH PRESERVED EJECTION FRACTION (HCC): ICD-10-CM

## 2024-02-29 DIAGNOSIS — I50.32 CHRONIC DIASTOLIC CONGESTIVE HEART FAILURE (HCC): Primary | ICD-10-CM

## 2024-02-29 LAB
ANION GAP SERPL CALCULATED.3IONS-SCNC: 12 MMOL/L (ref 3–16)
BUN SERPL-MCNC: 42 MG/DL (ref 7–20)
CALCIUM SERPL-MCNC: 9.2 MG/DL (ref 8.3–10.6)
CHLORIDE SERPL-SCNC: 96 MMOL/L (ref 99–110)
CO2 SERPL-SCNC: 30 MMOL/L (ref 21–32)
CREAT SERPL-MCNC: 1.4 MG/DL (ref 0.6–1.1)
GFR SERPLBLD CREATININE-BSD FMLA CKD-EPI: 44 ML/MIN/{1.73_M2}
GLUCOSE SERPL-MCNC: 109 MG/DL (ref 70–99)
NT-PROBNP SERPL-MCNC: 86 PG/ML (ref 0–124)
POTASSIUM SERPL-SCNC: 4.3 MMOL/L (ref 3.5–5.1)
SODIUM SERPL-SCNC: 138 MMOL/L (ref 136–145)

## 2024-02-29 PROCEDURE — 6360000002 HC RX W HCPCS: Performed by: INTERNAL MEDICINE

## 2024-02-29 PROCEDURE — 83880 ASSAY OF NATRIURETIC PEPTIDE: CPT

## 2024-02-29 PROCEDURE — 2580000003 HC RX 258: Performed by: INTERNAL MEDICINE

## 2024-02-29 PROCEDURE — 80048 BASIC METABOLIC PNL TOTAL CA: CPT

## 2024-02-29 PROCEDURE — 96374 THER/PROPH/DIAG INJ IV PUSH: CPT

## 2024-02-29 PROCEDURE — 99211 OFF/OP EST MAY X REQ PHY/QHP: CPT

## 2024-02-29 RX ORDER — SODIUM CHLORIDE 0.9 % (FLUSH) 0.9 %
5-40 SYRINGE (ML) INJECTION ONCE
Status: COMPLETED | OUTPATIENT
Start: 2024-02-29 | End: 2024-02-29

## 2024-02-29 RX ORDER — SODIUM CHLORIDE 0.9 % (FLUSH) 0.9 %
5-40 SYRINGE (ML) INJECTION ONCE
Start: 2024-03-07 | End: 2024-03-07

## 2024-02-29 RX ORDER — FUROSEMIDE 10 MG/ML
120 INJECTION INTRAMUSCULAR; INTRAVENOUS ONCE
Status: COMPLETED | OUTPATIENT
Start: 2024-02-29 | End: 2024-02-29

## 2024-02-29 RX ORDER — FUROSEMIDE 10 MG/ML
120 INJECTION INTRAMUSCULAR; INTRAVENOUS ONCE
Start: 2024-03-07 | End: 2024-03-07

## 2024-02-29 RX ADMIN — FUROSEMIDE 120 MG: 10 INJECTION, SOLUTION INTRAMUSCULAR; INTRAVENOUS at 14:11

## 2024-02-29 RX ADMIN — Medication 10 ML: at 14:11

## 2024-02-29 NOTE — PROGRESS NOTES
Pt ambulatory to infusion center for Lasix IVP. IV access obtained. Labs drawn per orders. IVP Lasix administered; 120 mg IVP given over 12 minutes. IV removed. Denied need for printed AVS. Pt to return in 2 weeks  for same treatment.

## 2024-03-04 ENCOUNTER — TELEPHONE (OUTPATIENT)
Dept: CARDIOLOGY CLINIC | Age: 54
End: 2024-03-04

## 2024-03-04 NOTE — TELEPHONE ENCOUNTER
Spoke with  Ha,  They did not follow the instructions from last ov regarding diuretic after lasix infusion.    Did not take meds for three days and is now up 12 pounds.    Discussed plan from 2/12, he says they were confused and didn't follow correctly.

## 2024-03-04 NOTE — TELEPHONE ENCOUNTER
Pt  has had a weight increase of 12 lb increase over 3 days. Current weight is 212 SOB and swelling.

## 2024-03-06 ENCOUNTER — APPOINTMENT (OUTPATIENT)
Dept: GENERAL RADIOLOGY | Age: 54
DRG: 194 | End: 2024-03-06
Payer: COMMERCIAL

## 2024-03-06 ENCOUNTER — HOSPITAL ENCOUNTER (INPATIENT)
Age: 54
LOS: 3 days | Discharge: HOME | DRG: 194 | End: 2024-03-09
Attending: EMERGENCY MEDICINE | Admitting: INTERNAL MEDICINE
Payer: COMMERCIAL

## 2024-03-06 DIAGNOSIS — R06.02 SOB (SHORTNESS OF BREATH): ICD-10-CM

## 2024-03-06 DIAGNOSIS — J45.30 MILD PERSISTENT ASTHMA WITHOUT COMPLICATION: ICD-10-CM

## 2024-03-06 DIAGNOSIS — M79.89 LEFT UPPER EXTREMITY SWELLING: ICD-10-CM

## 2024-03-06 DIAGNOSIS — R22.41 LOCALIZED SWELLING OF RIGHT LOWER EXTREMITY: Primary | ICD-10-CM

## 2024-03-06 DIAGNOSIS — N28.9 RENAL INSUFFICIENCY: Chronic | ICD-10-CM

## 2024-03-06 DIAGNOSIS — R06.89 HYPERCAPNIA: ICD-10-CM

## 2024-03-06 DIAGNOSIS — I50.9 ACUTE ON CHRONIC CONGESTIVE HEART FAILURE, UNSPECIFIED HEART FAILURE TYPE (HCC): ICD-10-CM

## 2024-03-06 DIAGNOSIS — I10 ESSENTIAL HYPERTENSION: ICD-10-CM

## 2024-03-06 DIAGNOSIS — I50.22 SYSTOLIC CHF, CHRONIC (HCC): ICD-10-CM

## 2024-03-06 DIAGNOSIS — I25.10 CORONARY ARTERY DISEASE INVOLVING NATIVE CORONARY ARTERY OF NATIVE HEART WITHOUT ANGINA PECTORIS: Chronic | ICD-10-CM

## 2024-03-06 PROBLEM — I50.33 ACUTE ON CHRONIC HEART FAILURE WITH PRESERVED EJECTION FRACTION (HCC): Status: ACTIVE | Noted: 2024-03-06

## 2024-03-06 LAB
ALBUMIN SERPL-MCNC: 4.2 G/DL (ref 3.4–5)
ALBUMIN/GLOB SERPL: 1.1 {RATIO} (ref 1.1–2.2)
ALP SERPL-CCNC: 123 U/L (ref 40–129)
ALT SERPL-CCNC: 16 U/L (ref 10–40)
ANION GAP SERPL CALCULATED.3IONS-SCNC: 10 MMOL/L (ref 3–16)
AST SERPL-CCNC: 18 U/L (ref 15–37)
BASE EXCESS BLDV CALC-SCNC: 9.3 MMOL/L (ref -3–3)
BASOPHILS # BLD: 0 K/UL (ref 0–0.2)
BASOPHILS NFR BLD: 0.5 %
BILIRUB SERPL-MCNC: <0.2 MG/DL (ref 0–1)
BUN SERPL-MCNC: 26 MG/DL (ref 7–20)
CALCIUM SERPL-MCNC: 9.4 MG/DL (ref 8.3–10.6)
CHLORIDE SERPL-SCNC: 96 MMOL/L (ref 99–110)
CO2 BLDV-SCNC: 88 MMOL/L
CO2 SERPL-SCNC: 34 MMOL/L (ref 21–32)
COHGB MFR BLDV: 1.8 % (ref 0–1.5)
CREAT SERPL-MCNC: 1.3 MG/DL (ref 0.6–1.1)
DEPRECATED RDW RBC AUTO: 15.3 % (ref 12.4–15.4)
DO-HGB MFR BLDV: 13 %
EOSINOPHIL # BLD: 0.2 K/UL (ref 0–0.6)
EOSINOPHIL NFR BLD: 1.6 %
GFR SERPLBLD CREATININE-BSD FMLA CKD-EPI: 49 ML/MIN/{1.73_M2}
GLUCOSE BLD-MCNC: 171 MG/DL (ref 70–99)
GLUCOSE BLD-MCNC: 60 MG/DL (ref 70–99)
GLUCOSE BLD-MCNC: 60 MG/DL (ref 70–99)
GLUCOSE SERPL-MCNC: 53 MG/DL (ref 70–99)
HCO3 BLDV-SCNC: 37.1 MMOL/L (ref 23–29)
HCT VFR BLD AUTO: 35.4 % (ref 36–48)
HGB BLD-MCNC: 11.7 G/DL (ref 12–16)
LEFT VENTRICULAR EJECTION FRACTION MODE: NORMAL
LV EF: 57 %
LYMPHOCYTES # BLD: 2.8 K/UL (ref 1–5.1)
LYMPHOCYTES NFR BLD: 29.3 %
MCH RBC QN AUTO: 28.8 PG (ref 26–34)
MCHC RBC AUTO-ENTMCNC: 33.1 G/DL (ref 31–36)
MCV RBC AUTO: 87.1 FL (ref 80–100)
METHGB MFR BLDV: 0.4 %
MONOCYTES # BLD: 0.8 K/UL (ref 0–1.3)
MONOCYTES NFR BLD: 8.5 %
NEUTROPHILS # BLD: 5.8 K/UL (ref 1.7–7.7)
NEUTROPHILS NFR BLD: 60.1 %
NT-PROBNP SERPL-MCNC: 243 PG/ML (ref 0–124)
O2 CT VFR BLDV CALC: 14 VOL %
O2 THERAPY: ABNORMAL
PCO2 BLDV: 66.5 MMHG (ref 40–50)
PERFORMED ON: ABNORMAL
PH BLDV: 7.36 [PH] (ref 7.35–7.45)
PLATELET # BLD AUTO: 251 K/UL (ref 135–450)
PMV BLD AUTO: 9 FL (ref 5–10.5)
PO2 BLDV: 58.2 MMHG (ref 25–40)
POTASSIUM SERPL-SCNC: 4.1 MMOL/L (ref 3.5–5.1)
PROT SERPL-MCNC: 8 G/DL (ref 6.4–8.2)
RBC # BLD AUTO: 4.07 M/UL (ref 4–5.2)
SAO2 % BLDV: 87 %
SODIUM SERPL-SCNC: 140 MMOL/L (ref 136–145)
TROPONIN, HIGH SENSITIVITY: 14 NG/L (ref 0–14)
TROPONIN, HIGH SENSITIVITY: 17 NG/L (ref 0–14)
WBC # BLD AUTO: 9.7 K/UL (ref 4–11)

## 2024-03-06 PROCEDURE — 82803 BLOOD GASES ANY COMBINATION: CPT

## 2024-03-06 PROCEDURE — 93005 ELECTROCARDIOGRAM TRACING: CPT | Performed by: EMERGENCY MEDICINE

## 2024-03-06 PROCEDURE — 83880 ASSAY OF NATRIURETIC PEPTIDE: CPT

## 2024-03-06 PROCEDURE — 2700000000 HC OXYGEN THERAPY PER DAY

## 2024-03-06 PROCEDURE — 2580000003 HC RX 258: Performed by: INTERNAL MEDICINE

## 2024-03-06 PROCEDURE — 96375 TX/PRO/DX INJ NEW DRUG ADDON: CPT

## 2024-03-06 PROCEDURE — 80053 COMPREHEN METABOLIC PANEL: CPT

## 2024-03-06 PROCEDURE — 6370000000 HC RX 637 (ALT 250 FOR IP): Performed by: EMERGENCY MEDICINE

## 2024-03-06 PROCEDURE — 99291 CRITICAL CARE FIRST HOUR: CPT

## 2024-03-06 PROCEDURE — 84484 ASSAY OF TROPONIN QUANT: CPT

## 2024-03-06 PROCEDURE — 71045 X-RAY EXAM CHEST 1 VIEW: CPT

## 2024-03-06 PROCEDURE — 94660 CPAP INITIATION&MGMT: CPT

## 2024-03-06 PROCEDURE — 85025 COMPLETE CBC W/AUTO DIFF WBC: CPT

## 2024-03-06 PROCEDURE — 94640 AIRWAY INHALATION TREATMENT: CPT

## 2024-03-06 PROCEDURE — 94761 N-INVAS EAR/PLS OXIMETRY MLT: CPT

## 2024-03-06 PROCEDURE — 5A09457 ASSISTANCE WITH RESPIRATORY VENTILATION, 24-96 CONSECUTIVE HOURS, CONTINUOUS POSITIVE AIRWAY PRESSURE: ICD-10-PCS | Performed by: INTERNAL MEDICINE

## 2024-03-06 PROCEDURE — 51702 INSERT TEMP BLADDER CATH: CPT

## 2024-03-06 PROCEDURE — 6360000002 HC RX W HCPCS: Performed by: INTERNAL MEDICINE

## 2024-03-06 PROCEDURE — 1200000000 HC SEMI PRIVATE

## 2024-03-06 PROCEDURE — 6360000002 HC RX W HCPCS: Performed by: PHYSICIAN ASSISTANT

## 2024-03-06 PROCEDURE — 96374 THER/PROPH/DIAG INJ IV PUSH: CPT

## 2024-03-06 RX ORDER — IPRATROPIUM BROMIDE AND ALBUTEROL SULFATE 2.5; .5 MG/3ML; MG/3ML
1 SOLUTION RESPIRATORY (INHALATION)
Status: DISCONTINUED | OUTPATIENT
Start: 2024-03-06 | End: 2024-03-06

## 2024-03-06 RX ORDER — OXYCODONE AND ACETAMINOPHEN 10; 325 MG/1; MG/1
1 TABLET ORAL EVERY 4 HOURS PRN
Status: DISCONTINUED | OUTPATIENT
Start: 2024-03-06 | End: 2024-03-09 | Stop reason: HOSPADM

## 2024-03-06 RX ORDER — IPRATROPIUM BROMIDE AND ALBUTEROL SULFATE 2.5; .5 MG/3ML; MG/3ML
1 SOLUTION RESPIRATORY (INHALATION) EVERY 4 HOURS PRN
Status: DISCONTINUED | OUTPATIENT
Start: 2024-03-06 | End: 2024-03-09 | Stop reason: HOSPADM

## 2024-03-06 RX ORDER — CLOPIDOGREL BISULFATE 75 MG/1
75 TABLET ORAL DAILY
Status: DISCONTINUED | OUTPATIENT
Start: 2024-03-07 | End: 2024-03-09 | Stop reason: HOSPADM

## 2024-03-06 RX ORDER — ALBUTEROL SULFATE 2.5 MG/3ML
2.5 SOLUTION RESPIRATORY (INHALATION) EVERY 6 HOURS PRN
Status: DISCONTINUED | OUTPATIENT
Start: 2024-03-06 | End: 2024-03-09 | Stop reason: HOSPADM

## 2024-03-06 RX ORDER — BENZTROPINE MESYLATE 1 MG/1
2 TABLET ORAL DAILY
Status: DISCONTINUED | OUTPATIENT
Start: 2024-03-07 | End: 2024-03-09 | Stop reason: HOSPADM

## 2024-03-06 RX ORDER — ONDANSETRON 2 MG/ML
4 INJECTION INTRAMUSCULAR; INTRAVENOUS ONCE
Status: COMPLETED | OUTPATIENT
Start: 2024-03-06 | End: 2024-03-06

## 2024-03-06 RX ORDER — ASPIRIN 81 MG/1
324 TABLET, CHEWABLE ORAL ONCE
Status: COMPLETED | OUTPATIENT
Start: 2024-03-06 | End: 2024-03-06

## 2024-03-06 RX ORDER — ASPIRIN 81 MG/1
81 TABLET ORAL DAILY
Status: DISCONTINUED | OUTPATIENT
Start: 2024-03-07 | End: 2024-03-09 | Stop reason: HOSPADM

## 2024-03-06 RX ORDER — LEVOTHYROXINE SODIUM 0.15 MG/1
150 TABLET ORAL
Status: DISCONTINUED | OUTPATIENT
Start: 2024-03-07 | End: 2024-03-09 | Stop reason: HOSPADM

## 2024-03-06 RX ORDER — IPRATROPIUM BROMIDE AND ALBUTEROL SULFATE 2.5; .5 MG/3ML; MG/3ML
1 SOLUTION RESPIRATORY (INHALATION) ONCE
Status: COMPLETED | OUTPATIENT
Start: 2024-03-06 | End: 2024-03-06

## 2024-03-06 RX ORDER — FUROSEMIDE 10 MG/ML
80 INJECTION INTRAMUSCULAR; INTRAVENOUS ONCE
Status: COMPLETED | OUTPATIENT
Start: 2024-03-06 | End: 2024-03-06

## 2024-03-06 RX ORDER — TORSEMIDE 100 MG/1
TABLET ORAL
Qty: 155 TABLET | Refills: 3 | Status: SHIPPED | OUTPATIENT
Start: 2024-03-06

## 2024-03-06 RX ORDER — NITROGLYCERIN 0.4 MG/1
0.4 TABLET SUBLINGUAL EVERY 5 MIN PRN
Status: DISCONTINUED | OUTPATIENT
Start: 2024-03-06 | End: 2024-03-09 | Stop reason: HOSPADM

## 2024-03-06 RX ORDER — MONTELUKAST SODIUM 10 MG/1
10 TABLET ORAL
Qty: 30 TABLET | Refills: 0 | Status: SHIPPED | OUTPATIENT
Start: 2024-03-06

## 2024-03-06 RX ORDER — IPRATROPIUM BROMIDE AND ALBUTEROL SULFATE 2.5; .5 MG/3ML; MG/3ML
1 SOLUTION RESPIRATORY (INHALATION)
Status: DISCONTINUED | OUTPATIENT
Start: 2024-03-07 | End: 2024-03-09 | Stop reason: HOSPADM

## 2024-03-06 RX ORDER — NITROGLYCERIN 20 MG/100ML
5-200 INJECTION INTRAVENOUS CONTINUOUS
Status: DISCONTINUED | OUTPATIENT
Start: 2024-03-06 | End: 2024-03-06

## 2024-03-06 RX ORDER — POTASSIUM CHLORIDE 20 MEQ/1
40 TABLET, EXTENDED RELEASE ORAL DAILY
Status: DISCONTINUED | OUTPATIENT
Start: 2024-03-07 | End: 2024-03-07

## 2024-03-06 RX ORDER — DULOXETIN HYDROCHLORIDE 60 MG/1
60 CAPSULE, DELAYED RELEASE ORAL 2 TIMES DAILY
Status: DISCONTINUED | OUTPATIENT
Start: 2024-03-07 | End: 2024-03-09 | Stop reason: HOSPADM

## 2024-03-06 RX ADMIN — FUROSEMIDE 5 MG/HR: 10 INJECTION, SOLUTION INTRAMUSCULAR; INTRAVENOUS at 23:16

## 2024-03-06 RX ADMIN — ONDANSETRON 4 MG: 2 INJECTION INTRAMUSCULAR; INTRAVENOUS at 19:45

## 2024-03-06 RX ADMIN — FUROSEMIDE 80 MG: 10 INJECTION, SOLUTION INTRAMUSCULAR; INTRAVENOUS at 20:10

## 2024-03-06 RX ADMIN — IPRATROPIUM BROMIDE AND ALBUTEROL SULFATE 1 DOSE: .5; 3 SOLUTION RESPIRATORY (INHALATION) at 19:52

## 2024-03-06 RX ADMIN — ASPIRIN 324 MG: 81 TABLET, CHEWABLE ORAL at 17:42

## 2024-03-06 RX ADMIN — IPRATROPIUM BROMIDE AND ALBUTEROL SULFATE 1 DOSE: .5; 3 SOLUTION RESPIRATORY (INHALATION) at 17:47

## 2024-03-06 ASSESSMENT — ENCOUNTER SYMPTOMS
VOMITING: 0
RHINORRHEA: 0
COUGH: 1
NAUSEA: 0
WHEEZING: 0
SHORTNESS OF BREATH: 1
DIARRHEA: 0
ABDOMINAL PAIN: 0

## 2024-03-06 ASSESSMENT — PAIN - FUNCTIONAL ASSESSMENT: PAIN_FUNCTIONAL_ASSESSMENT: 0-10

## 2024-03-06 ASSESSMENT — PAIN DESCRIPTION - LOCATION: LOCATION: CHEST;BACK

## 2024-03-06 ASSESSMENT — PAIN SCALES - GENERAL: PAINLEVEL_OUTOF10: 7

## 2024-03-06 NOTE — PROGRESS NOTES
03/06/24 1755   RT Protocol   History Pulmonary Disease 0   Respiratory pattern 4   Breath sounds 2   Cough 1   Indications for Bronchodilator Therapy On home bronchodilators   Bronchodilator Assessment Score 7

## 2024-03-06 NOTE — TELEPHONE ENCOUNTER
Pt's  stated pt is now 213 lbs today.  Also, pt's eye is beginning to bruise due to the swelling.  She is experiencing worsening sob, pt cannot lay flat, and she is having difficulty walking.    Pt's  is reporting that she is not urinating either.    Please advise.

## 2024-03-06 NOTE — ED NOTES
Pts BP in the 100s systolic at this time. Dr. Sims aware, per verbal order to hold nitro at this time. Pt rates chest pain 5 on a scale of 0-10 at this time. Will cont to monitor.

## 2024-03-06 NOTE — TELEPHONE ENCOUNTER
Medication Refill    Medication needing refilled:  torsemide (DEMADEX)   Dosage of the medication:  100 MG tablet   How are you taking this medication (QD, BID, TID, QID, PRN):  *100 mg in morning and 50 in evening*different than writte  30 or 90 day supply called in:  90 day refill  When will you run out of your medication:  5 days  Which Pharmacy are we sending the medication to?:  [id number] - [facility] [address] - P [phone] - F [phone]

## 2024-03-06 NOTE — TELEPHONE ENCOUNTER
Pt  called back asking it the office can let the ER know they are on their way so they do not have to wait 2-3 hrs?    Pls advise     [place]  [phone]

## 2024-03-06 NOTE — ED NOTES
BS of 60 at this time. Dr. Sims aware, verbal order to give D50 at this time. Will recheck BS in 15 minutes. Pt alert and oriented at this time. Will cont to monitor.    blurry vision L eye/HEADACHE

## 2024-03-06 NOTE — RT PROTOCOL NOTE
RT Inhaler-Nebulizer Bronchodilator Protocol Note    There is a bronchodilator order in the chart from a provider indicating to follow the RT Bronchodilator Protocol and there is an “Initiate RT Inhaler-Nebulizer Bronchodilator Protocol” order as well (see protocol at bottom of note).    CXR Findings:  XR CHEST PORTABLE    Result Date: 3/6/2024  No acute cardiopulmonary process.       The findings from the last RT Protocol Assessment were as follows:   History Pulmonary Disease: None or smoker <15 pack years  Respiratory Pattern: Mild dyspnea at rest, irregular pattern, or RR 21-25 bpm  Breath Sounds: Slightly diminished and/or crackles  Cough: Strong, productive  Indication for Bronchodilator Therapy: On home bronchodilators  Bronchodilator Assessment Score: 7    Aerosolized bronchodilator medication orders have been revised according to the RT Inhaler-Nebulizer Bronchodilator Protocol below.    Respiratory Therapist to perform RT Therapy Protocol Assessment initially then follow the protocol.  Repeat RT Therapy Protocol Assessment PRN for score 0-3 or on second treatment, BID, and PRN for scores above 3.    No Indications - adjust the frequency to every 6 hours PRN wheezing or bronchospasm, if no treatments needed after 48 hours then discontinue using Per Protocol order mode.     If indication present, adjust the RT bronchodilator orders based on the Bronchodilator Assessment Score as indicated below.  Use Inhaler orders unless patient has one or more of the following: on home nebulizer, not able to hold breath for 10 seconds, is not alert and oriented, cannot activate and use MDI correctly, or respiratory rate 25 breaths per minute or more, then use the equivalent nebulizer order(s) with same Frequency and PRN reasons based on the score.  If a patient is on this medication at home then do not decrease Frequency below that used at home.    0-3 - enter or revise RT bronchodilator order(s) to equivalent RT

## 2024-03-06 NOTE — TELEPHONE ENCOUNTER
Spoke with Mr. Bonner  He is agreeable to bring pt to [facility] ER

## 2024-03-06 NOTE — ED PROVIDER NOTES
[facility] EMERGENCY DEPARTMENT  EMERGENCY DEPARTMENT ENCOUNTER        Pt Name: Crow Bonner  MRN: [id number]  Birthdate 1970  Date of evaluation: 3/6/2024  Provider: Isis Quiñonez PA-C  PCP: Mariana Simental DO  Note Started: 5:21 PM EST 3/6/24       I have seen and evaluated this patient with my supervising physician Guido Sims DO.      CHIEF COMPLAINT       Chief Complaint   Patient presents with    Shortness of Breath     CHF, increased swelling. On 2.5 liters O2 at home. Decreased urination.        HISTORY OF PRESENT ILLNESS: 1 or more Elements     History From: patient,   Limitations to history : None    Crow Bonner is a 53 y.o. female who presents for evaluation of shortness of breath and chest pain.  Patient has history of CHF and is on 2 and half liters of oxygen at home.  No increasing oxygen requirement but has and states that she is about 8 pounds overweight and is retaining fluid.  Also reports decreased urination, states that she has not urinated today.  She reports medication compliance with diuretics.  No has had cough, nonproductive.  No fevers or chills.  Lymphedema of the left upper extremity status post mastectomy.  Increasing swelling to bilateral lower extremities.  She has no other complaints or concerns at this time.    Nursing Notes were all reviewed and agreed with or any disagreements were addressed in the HPI.    REVIEW OF SYSTEMS :      Review of Systems   Constitutional:  Positive for unexpected weight change. Negative for appetite change, chills and fever.   HENT:  Negative for congestion and rhinorrhea.    Respiratory:  Positive for cough and shortness of breath. Negative for wheezing.    Cardiovascular:  Negative for chest pain.   Gastrointestinal:  Negative for abdominal pain, diarrhea, nausea and vomiting.   Genitourinary:  Negative for difficulty urinating, dysuria and hematuria.   Musculoskeletal:  Negative for neck pain and neck  TABLET    Take 100mg in the AM and 50mg in the PM on ALL other days.    UREA 40 % LOTN    Apply to feet nightly and cover with Aquaphor    VITAMIN B-12 (CYANOCOBALAMIN) 1000 MCG TABLET    Take 1 tablet by mouth daily       ALLERGIES     Iv dye [iodides], Diazepam, Insulin glargine, and Trazodone and nefazodone    FAMILYHISTORY       Family History   Problem Relation Age of Onset    Asthma Other     Cancer Other     Depression Other     Diabetes Other     Hypertension Other     High Cholesterol Other     Migraines Other     Heart Attack Father 65         of MI    High Blood Pressure Mother     Diabetes Mother         SOCIAL HISTORY       Social History     Tobacco Use    Smoking status: Never    Smokeless tobacco: Never   Vaping Use    Vaping Use: Never used   Substance Use Topics    Alcohol use: No    Drug use: No       SCREENINGS        Dominga Coma Scale  Eye Opening: Spontaneous  Best Verbal Response: Oriented  Best Motor Response: Obeys commands  Dominga Coma Scale Score: 15                CIWA Assessment  BP: 101/76  Pulse: 65           PHYSICAL EXAM  1 or more Elements     ED Triage Vitals   BP Temp Temp Source Pulse Respirations SpO2 Height Weight - Scale   24 1658 24 1658 24 1658 24 1658 24 1658 24 1658 -- 24 1700   (!) 160/82 97.6 °F (36.4 °C) Oral 75 24 98 %  97.6 kg (215 lb 1.6 oz)       Physical Exam  Vitals and nursing note reviewed.   Constitutional:       Appearance: She is well-developed. She is ill-appearing. She is not diaphoretic.   HENT:      Head: Normocephalic and atraumatic.      Right Ear: External ear normal.      Left Ear: External ear normal.      Nose: Nose normal.   Eyes:      General:         Right eye: No discharge.         Left eye: No discharge.   Cardiovascular:      Rate and Rhythm: Normal rate and regular rhythm.      Heart sounds: Normal heart sounds.   Pulmonary:      Effort: Pulmonary effort is normal. No respiratory distress.

## 2024-03-07 PROBLEM — R06.89 HYPERCAPNIA: Status: ACTIVE | Noted: 2024-03-07

## 2024-03-07 PROBLEM — M79.89 LEFT UPPER EXTREMITY SWELLING: Status: ACTIVE | Noted: 2024-03-07

## 2024-03-07 LAB
ALBUMIN SERPL-MCNC: 3.6 G/DL (ref 3.4–5)
ANION GAP SERPL CALCULATED.3IONS-SCNC: 9 MMOL/L (ref 3–16)
BACTERIA URNS QL MICRO: ABNORMAL /HPF
BILIRUB UR QL STRIP.AUTO: NEGATIVE
BUN SERPL-MCNC: 27 MG/DL (ref 7–20)
CALCIUM SERPL-MCNC: 8.8 MG/DL (ref 8.3–10.6)
CHLORIDE SERPL-SCNC: 98 MMOL/L (ref 99–110)
CLARITY UR: CLEAR
CO2 SERPL-SCNC: 37 MMOL/L (ref 21–32)
COLOR UR: YELLOW
CREAT SERPL-MCNC: 1.3 MG/DL (ref 0.6–1.1)
EPI CELLS #/AREA URNS AUTO: 0 /HPF (ref 0–5)
GFR SERPLBLD CREATININE-BSD FMLA CKD-EPI: 49 ML/MIN/{1.73_M2}
GLUCOSE BLD-MCNC: 112 MG/DL (ref 70–99)
GLUCOSE BLD-MCNC: 198 MG/DL (ref 70–99)
GLUCOSE BLD-MCNC: 203 MG/DL (ref 70–99)
GLUCOSE BLD-MCNC: 310 MG/DL (ref 70–99)
GLUCOSE BLD-MCNC: 365 MG/DL (ref 70–99)
GLUCOSE BLD-MCNC: 374 MG/DL (ref 70–99)
GLUCOSE BLD-MCNC: 386 MG/DL (ref 70–99)
GLUCOSE BLD-MCNC: 94 MG/DL (ref 70–99)
GLUCOSE SERPL-MCNC: 223 MG/DL (ref 70–99)
GLUCOSE UR STRIP.AUTO-MCNC: >=1000 MG/DL
HGB UR QL STRIP.AUTO: ABNORMAL
HYALINE CASTS #/AREA URNS AUTO: 1 /LPF (ref 0–8)
KETONES UR STRIP.AUTO-MCNC: NEGATIVE MG/DL
LEUKOCYTE ESTERASE UR QL STRIP.AUTO: ABNORMAL
MAGNESIUM SERPL-MCNC: 2.1 MG/DL (ref 1.8–2.4)
NITRITE UR QL STRIP.AUTO: NEGATIVE
PERFORMED ON: ABNORMAL
PERFORMED ON: NORMAL
PH UR STRIP.AUTO: 7 [PH] (ref 5–8)
PHOSPHATE SERPL-MCNC: 4.6 MG/DL (ref 2.5–4.9)
POTASSIUM SERPL-SCNC: 4.6 MMOL/L (ref 3.5–5.1)
PROT UR STRIP.AUTO-MCNC: NEGATIVE MG/DL
RBC CLUMPS #/AREA URNS AUTO: 7 /HPF (ref 0–4)
SODIUM SERPL-SCNC: 144 MMOL/L (ref 136–145)
SP GR UR STRIP.AUTO: 1.01 (ref 1–1.03)
UA DIPSTICK W REFLEX MICRO PNL UR: YES
URN SPEC COLLECT METH UR: ABNORMAL
UROBILINOGEN UR STRIP-ACNC: 0.2 E.U./DL
WBC #/AREA URNS AUTO: 5 /HPF (ref 0–5)

## 2024-03-07 PROCEDURE — 6370000000 HC RX 637 (ALT 250 FOR IP): Performed by: STUDENT IN AN ORGANIZED HEALTH CARE EDUCATION/TRAINING PROGRAM

## 2024-03-07 PROCEDURE — 2060000000 HC ICU INTERMEDIATE R&B

## 2024-03-07 PROCEDURE — 6370000000 HC RX 637 (ALT 250 FOR IP): Performed by: INTERNAL MEDICINE

## 2024-03-07 PROCEDURE — 94640 AIRWAY INHALATION TREATMENT: CPT

## 2024-03-07 PROCEDURE — 6370000000 HC RX 637 (ALT 250 FOR IP): Performed by: NURSE PRACTITIONER

## 2024-03-07 PROCEDURE — 94660 CPAP INITIATION&MGMT: CPT

## 2024-03-07 PROCEDURE — 2580000003 HC RX 258: Performed by: INTERNAL MEDICINE

## 2024-03-07 PROCEDURE — 6360000004 HC RX CONTRAST MEDICATION: Performed by: INTERNAL MEDICINE

## 2024-03-07 PROCEDURE — 80069 RENAL FUNCTION PANEL: CPT

## 2024-03-07 PROCEDURE — 83735 ASSAY OF MAGNESIUM: CPT

## 2024-03-07 PROCEDURE — 81001 URINALYSIS AUTO W/SCOPE: CPT

## 2024-03-07 PROCEDURE — 93971 EXTREMITY STUDY: CPT

## 2024-03-07 PROCEDURE — 99223 1ST HOSP IP/OBS HIGH 75: CPT | Performed by: INTERNAL MEDICINE

## 2024-03-07 PROCEDURE — 2700000000 HC OXYGEN THERAPY PER DAY

## 2024-03-07 PROCEDURE — 6370000000 HC RX 637 (ALT 250 FOR IP): Performed by: PHYSICIAN ASSISTANT

## 2024-03-07 PROCEDURE — 6360000002 HC RX W HCPCS: Performed by: INTERNAL MEDICINE

## 2024-03-07 PROCEDURE — C8929 TTE W OR WO FOL WCON,DOPPLER: HCPCS

## 2024-03-07 PROCEDURE — 94761 N-INVAS EAR/PLS OXIMETRY MLT: CPT

## 2024-03-07 RX ORDER — INSULIN LISPRO 100 [IU]/ML
0-4 INJECTION, SOLUTION INTRAVENOUS; SUBCUTANEOUS NIGHTLY
Status: DISCONTINUED | OUTPATIENT
Start: 2024-03-07 | End: 2024-03-07

## 2024-03-07 RX ORDER — INSULIN LISPRO 100 [IU]/ML
0-16 INJECTION, SOLUTION INTRAVENOUS; SUBCUTANEOUS
Status: DISCONTINUED | OUTPATIENT
Start: 2024-03-07 | End: 2024-03-09 | Stop reason: HOSPADM

## 2024-03-07 RX ORDER — ACETAMINOPHEN 325 MG/1
650 TABLET ORAL EVERY 4 HOURS PRN
Status: DISCONTINUED | OUTPATIENT
Start: 2024-03-07 | End: 2024-03-09 | Stop reason: HOSPADM

## 2024-03-07 RX ORDER — GUAIFENESIN 600 MG/1
600 TABLET, EXTENDED RELEASE ORAL 2 TIMES DAILY
Status: DISCONTINUED | OUTPATIENT
Start: 2024-03-07 | End: 2024-03-09 | Stop reason: HOSPADM

## 2024-03-07 RX ORDER — SPIRONOLACTONE 25 MG/1
25 TABLET ORAL 2 TIMES DAILY
Status: DISCONTINUED | OUTPATIENT
Start: 2024-03-07 | End: 2024-03-08

## 2024-03-07 RX ORDER — INSULIN LISPRO 100 [IU]/ML
0-4 INJECTION, SOLUTION INTRAVENOUS; SUBCUTANEOUS
Status: DISCONTINUED | OUTPATIENT
Start: 2024-03-07 | End: 2024-03-07

## 2024-03-07 RX ORDER — INSULIN GLARGINE 100 [IU]/ML
20 INJECTION, SOLUTION SUBCUTANEOUS NIGHTLY
Status: DISCONTINUED | OUTPATIENT
Start: 2024-03-07 | End: 2024-03-08

## 2024-03-07 RX ORDER — OXYCODONE HCL 10 MG/1
20 TABLET, FILM COATED, EXTENDED RELEASE ORAL 2 TIMES DAILY
Status: DISCONTINUED | OUTPATIENT
Start: 2024-03-07 | End: 2024-03-09 | Stop reason: HOSPADM

## 2024-03-07 RX ORDER — OXYCODONE HCL 10 MG/1
20 TABLET, FILM COATED, EXTENDED RELEASE ORAL 2 TIMES DAILY
Status: DISCONTINUED | OUTPATIENT
Start: 2024-03-07 | End: 2024-03-07

## 2024-03-07 RX ORDER — INSULIN LISPRO 100 [IU]/ML
0-4 INJECTION, SOLUTION INTRAVENOUS; SUBCUTANEOUS NIGHTLY
Status: DISCONTINUED | OUTPATIENT
Start: 2024-03-07 | End: 2024-03-09 | Stop reason: HOSPADM

## 2024-03-07 RX ORDER — INSULIN GLARGINE 100 [IU]/ML
15 INJECTION, SOLUTION SUBCUTANEOUS NIGHTLY
Status: DISCONTINUED | OUTPATIENT
Start: 2024-03-07 | End: 2024-03-07

## 2024-03-07 RX ORDER — DEXTROSE MONOHYDRATE 100 MG/ML
INJECTION, SOLUTION INTRAVENOUS CONTINUOUS PRN
Status: DISCONTINUED | OUTPATIENT
Start: 2024-03-07 | End: 2024-03-09 | Stop reason: HOSPADM

## 2024-03-07 RX ORDER — GLUCAGON 1 MG/ML
1 KIT INJECTION PRN
Status: DISCONTINUED | OUTPATIENT
Start: 2024-03-07 | End: 2024-03-09 | Stop reason: HOSPADM

## 2024-03-07 RX ADMIN — ACETAMINOPHEN 650 MG: 325 TABLET ORAL at 08:27

## 2024-03-07 RX ADMIN — INSULIN LISPRO 12 UNITS: 100 INJECTION, SOLUTION INTRAVENOUS; SUBCUTANEOUS at 17:45

## 2024-03-07 RX ADMIN — OXYCODONE HYDROCHLORIDE 20 MG: 10 TABLET, FILM COATED, EXTENDED RELEASE ORAL at 01:17

## 2024-03-07 RX ADMIN — CLOPIDOGREL BISULFATE 75 MG: 75 TABLET ORAL at 08:27

## 2024-03-07 RX ADMIN — EMPAGLIFLOZIN 10 MG: 10 TABLET, FILM COATED ORAL at 17:45

## 2024-03-07 RX ADMIN — IPRATROPIUM BROMIDE AND ALBUTEROL SULFATE 1 DOSE: .5; 3 SOLUTION RESPIRATORY (INHALATION) at 12:51

## 2024-03-07 RX ADMIN — IPRATROPIUM BROMIDE AND ALBUTEROL SULFATE 1 DOSE: .5; 3 SOLUTION RESPIRATORY (INHALATION) at 21:06

## 2024-03-07 RX ADMIN — POTASSIUM CHLORIDE 40 MEQ: 1500 TABLET, EXTENDED RELEASE ORAL at 08:27

## 2024-03-07 RX ADMIN — OXYCODONE AND ACETAMINOPHEN 1 TABLET: 10; 325 TABLET ORAL at 01:18

## 2024-03-07 RX ADMIN — DULOXETINE HYDROCHLORIDE 60 MG: 60 CAPSULE, DELAYED RELEASE ORAL at 08:28

## 2024-03-07 RX ADMIN — IPRATROPIUM BROMIDE AND ALBUTEROL SULFATE 1 DOSE: .5; 3 SOLUTION RESPIRATORY (INHALATION) at 07:38

## 2024-03-07 RX ADMIN — PERFLUTREN 1.5 ML: 6.52 INJECTION, SUSPENSION INTRAVENOUS at 14:02

## 2024-03-07 RX ADMIN — DULOXETINE HYDROCHLORIDE 60 MG: 60 CAPSULE, DELAYED RELEASE ORAL at 21:34

## 2024-03-07 RX ADMIN — GUAIFENESIN 600 MG: 600 TABLET, EXTENDED RELEASE ORAL at 21:35

## 2024-03-07 RX ADMIN — ASPIRIN 81 MG: 81 TABLET, COATED ORAL at 08:28

## 2024-03-07 RX ADMIN — FUROSEMIDE 5 MG/HR: 10 INJECTION, SOLUTION INTRAMUSCULAR; INTRAVENOUS at 13:02

## 2024-03-07 RX ADMIN — OXYCODONE HYDROCHLORIDE 20 MG: 10 TABLET, FILM COATED, EXTENDED RELEASE ORAL at 08:26

## 2024-03-07 RX ADMIN — INSULIN HUMAN 10 UNITS: 100 INJECTION, SOLUTION PARENTERAL at 15:17

## 2024-03-07 RX ADMIN — SPIRONOLACTONE 25 MG: 25 TABLET ORAL at 17:45

## 2024-03-07 RX ADMIN — INSULIN LISPRO 4 UNITS: 100 INJECTION, SOLUTION INTRAVENOUS; SUBCUTANEOUS at 16:47

## 2024-03-07 RX ADMIN — LEVOTHYROXINE SODIUM 150 MCG: 150 TABLET ORAL at 08:29

## 2024-03-07 RX ADMIN — INSULIN GLARGINE 20 UNITS: 100 INJECTION, SOLUTION SUBCUTANEOUS at 21:34

## 2024-03-07 RX ADMIN — OXYCODONE HYDROCHLORIDE 20 MG: 10 TABLET, FILM COATED, EXTENDED RELEASE ORAL at 21:34

## 2024-03-07 RX ADMIN — METOPROLOL TARTRATE 25 MG: 25 TABLET, FILM COATED ORAL at 21:35

## 2024-03-07 RX ADMIN — BENZTROPINE MESYLATE 2 MG: 1 TABLET ORAL at 08:39

## 2024-03-07 ASSESSMENT — PAIN DESCRIPTION - DESCRIPTORS
DESCRIPTORS: ACHING;DISCOMFORT
DESCRIPTORS: ACHING;DISCOMFORT

## 2024-03-07 ASSESSMENT — PAIN DESCRIPTION - PAIN TYPE: TYPE: SURGICAL PAIN

## 2024-03-07 ASSESSMENT — PAIN DESCRIPTION - FREQUENCY
FREQUENCY: CONTINUOUS
FREQUENCY: CONTINUOUS

## 2024-03-07 ASSESSMENT — PAIN DESCRIPTION - ORIENTATION
ORIENTATION: LEFT;RIGHT
ORIENTATION: LEFT

## 2024-03-07 ASSESSMENT — PAIN SCALES - GENERAL
PAINLEVEL_OUTOF10: 8
PAINLEVEL_OUTOF10: 7
PAINLEVEL_OUTOF10: 7

## 2024-03-07 ASSESSMENT — PAIN DESCRIPTION - ONSET
ONSET: ON-GOING
ONSET: ON-GOING

## 2024-03-07 ASSESSMENT — PAIN DESCRIPTION - LOCATION
LOCATION: BACK;ARM
LOCATION: GENERALIZED
LOCATION: LEG;HAND

## 2024-03-07 ASSESSMENT — PAIN SCALES - WONG BAKER: WONGBAKER_NUMERICALRESPONSE: 8

## 2024-03-07 ASSESSMENT — ENCOUNTER SYMPTOMS
SHORTNESS OF BREATH: 1
GASTROINTESTINAL NEGATIVE: 1

## 2024-03-07 ASSESSMENT — PAIN - FUNCTIONAL ASSESSMENT: PAIN_FUNCTIONAL_ASSESSMENT: PREVENTS OR INTERFERES SOME ACTIVE ACTIVITIES AND ADLS

## 2024-03-07 NOTE — PROGRESS NOTES
Clinical Pharmacy Note    Pharmacy can not supply Rexulti and Levemir.   I have notified the nurse and requested the medication be brought from home.     The patient/nurse is unsure if the medication will be able to be brought in.      Please follow to ensure that medication therapy needs are met.    Thanks,  Diego Gallegos, PharmD

## 2024-03-07 NOTE — CONSULTS
Urology Consult Note  [facility]     Patient: Crow Bonner MRN: [id number]  Room/Bed: Z7Z-5246/5917-01   YOB: 1970  Age/Sex: 53 y.o.female  Admission Date: 3/6/2024     Date of Service:  3/7/2024    Consulting Provider: Bill Thacker, HERM[address] CNP CNP  Admitting/Requesting Physician: Neeraj Martel MD  Primary Care Physician: Mariana Simental DO    Reason for Consult: Urinary Retention    ASSESSMENT/PLAN     54 yo female with PMHx of HTN, HLD, asthma/COPD, IDDM2, CAD, ESBL, fibromyalgia, breast cancer (s/p mastectomy, now w/ LUE lymphedema), hypothyroid, LUIS. Now admitted with left upper extremity swelling and fluid overload. Jamison placed for PVR of 680cc's. Urology has been consulted for urinary retention. Jamison in place draining CYU.    Recommendations:  Continue the jamison catheter  Add Flomax when BP is stable  Remove jamison when agressive diuresis subsides  Will follow    All patient questions were answered. She understands the plan as listed above.    HISTORY     Chief Complaint:   Chief Complaint   Patient presents with    Shortness of Breath     CHF, increased swelling. On 2.5 liters O2 at home. Decreased urination.        History of Present Illness: Crow Bonner is a 53 y.o. female with urinary retention. Onset of symptoms was days ago with improving course since that time. Symptoms are aggravated by lasix. Symptoms improved with jamison. Associated symptoms include decreased urine ouput. Patient also reports no hx of retention. She has tried the following treatments: jamison.     Past Medical History:  She has a past medical history of Acute CVA (cerebrovascular accident) ([facility]) (3/9/2023), Anxiety, Anxiety and depression, Arthritis, Asthma, CAD (coronary artery disease), Cancer ([facility]) (2007), Cerebral artery occlusion with cerebral infarction ([facility]), CHF (congestive heart failure) ([facility]) (2018), Chronic kidney disease, Chronic pain, Constipation, COPD (chronic obstructive  pulmonary disease) (HCC), Depression, Diabetes mellitus (HCC), Diabetic polyneuropathy associated with type 2 diabetes mellitus (HCC) (2/2/2018), Dysthymia (2/2/2018), ESBL (extended spectrum beta-lactamase) producing bacteria infection (03/14/2018), Fibromyalgia, Gastric ulcer, unspecified as acute or chronic, without mention of hemorrhage, perforation, or obstruction, GERD (gastroesophageal reflux disease), Gout, HIGH CHOLESTEROL, Hypertension, Hypothyroidism, Pneumonia (3/25/2020), Pyelonephritis, Severe persistent asthma without complication (2/2/2018), Thyroid disease, and TIA (transient ischemic attack).     Hospital Problem List:  Principal Problem:    Acute on chronic heart failure with preserved ejection fraction (HCC)  Resolved Problems:    * No resolved hospital problems. *      Past Surgical History:  She has a past surgical history that includes Breast surgery; Hysterectomy (2005); Carpal tunnel release; Tonsillectomy; Finger contracture surgery; Upper gastrointestinal endoscopy (2019); and Mastectomy.     Social History:  She reports that she has never smoked. She has never used smokeless tobacco. She reports that she does not drink alcohol and does not use drugs.     Family History:  family history includes Asthma in an other family member; Cancer in an other family member; Depression in an other family member; Diabetes in her mother and another family member; Heart Attack (age of onset: 65) in her father; High Blood Pressure in her mother; High Cholesterol in an other family member; Hypertension in an other family member; Migraines in an other family member.    Allergies:  Allergies   Allergen Reactions    Iv Dye [Iodides] Anaphylaxis     allergic to ct scan dye, not the MRI    Diazepam Other (See Comments)    Insulin Glargine Other (See Comments)     Patient can take per .  Issue was with insurance per Dr. Rosa    Trazodone And Nefazodone Other (See Comments)     Makes patient feel very

## 2024-03-07 NOTE — CONSULTS
[facility]  Advanced CHF/Pulmonary Hypertension   Cardiac Evaluation      Crow Bonner  YOB: 1970    Requesting Physician:  Dr. Martel      Chief Complaint   Patient presents with    Shortness of Breath     CHF, increased swelling. On 2.5 liters O2 at home. Decreased urination.         History of Present Illness:  Crow Bonner is a 54 yo female who presented to the ED for evaluation of shortness of breath and chest pain.  She has a history of chronic diastolic HF.  She is on 2.5 liters oxygen at home.  Her oxygen requirements have been stable at home.  She has recently gained 8 pounds of fluid.  She was most concerned about decreased urination.  Upon arrival to the ED, she had not urinated.  No fever or chills.  She has severe lymphedema of the left upper extremity s/p mastectomy.  She has had increased swelling to bilateral lower extremities.      Ms. Bonner is well known to me from the office.  She has chronic dCHF, CAD, severe uncontrolled DM type II with cardiac cath 2014 at  showing diffuse LAD disease and small vessel disease with normal RCA and LCX (treated medically).  Breast cancer 10+ years ago s/p mastectomy.  LUIS with cpap non compliant.  Her cpap machine was actually sent back to the company because she was not using it.     She has had issues with fluid overload but multiple echos have shown normal EF and diastolic function. Cardiac MRI performed showed normal LVEF 61% and no evidence of constriction. ten years ago she had radiation for breast cancer.  She follows with endocrinology Dr. Taylor. She had a LHC/RHC  2/28/18 to r/o pericardial constriction as cause for her frequent episodes of fluid overload due to her history of radiation therapy to left breast. Also was looking for restriction that could cause fluid build up as well.  No evidence found. Coronaries showed small vessel CAD due to diabetes.  No constriction or restriction.  She has chronic rheumatoid

## 2024-03-07 NOTE — PLAN OF CARE
Problem: Discharge Planning  Goal: Discharge to home or other facility with appropriate resources  Outcome: Progressing     Problem: Pain  Goal: Verbalizes/displays adequate comfort level or baseline comfort level  Outcome: Progressing     Problem: Safety - Adult  Goal: Free from fall injury  Outcome: Progressing     Problem: Respiratory - Adult  Goal: Achieves optimal ventilation and oxygenation  Outcome: Progressing     Problem: Cardiovascular - Adult  Goal: Maintains optimal cardiac output and hemodynamic stability  Outcome: Progressing  Goal: Absence of cardiac dysrhythmias or at baseline  Outcome: Progressing     Problem: Skin/Tissue Integrity - Adult  Goal: Skin integrity remains intact  Outcome: Progressing     Problem: Musculoskeletal - Adult  Goal: Return mobility to safest level of function  Outcome: Progressing     Problem: Gastrointestinal - Adult  Goal: Minimal or absence of nausea and vomiting  Outcome: Progressing     Problem: Genitourinary - Adult  Goal: Absence of urinary retention  Outcome: Progressing     Problem: Infection - Adult  Goal: Absence of infection at discharge  Outcome: Progressing  Goal: Absence of infection during hospitalization  Outcome: Progressing     Problem: Metabolic/Fluid and Electrolytes - Adult  Goal: Electrolytes maintained within normal limits  Outcome: Progressing  Goal: Hemodynamic stability and optimal renal function maintained  Outcome: Progressing  Goal: Glucose maintained within prescribed range  Outcome: Progressing     Problem: Hematologic - Adult  Goal: Maintains hematologic stability  Outcome: Progressing

## 2024-03-07 NOTE — ED PROVIDER NOTES
In addition to the advanced practice provider, I personally saw Crow Bonner and performed a substantive portion of the visit including all aspects of the medical decision making. I made/approved the management plan and take responsibility for the patient management    Briefly, this is a 53 y.o. female here for shortness of breath and chest pain.  History of CHF, believes she is retaining fluid.  Also reports diffuse pressure-like chest pain.  Took nitroglycerin earlier today which temporarily relieved her pain.    On exam, patient afebrile however ill-appearing.  She is tachypneic, speaks in short phrases and appears in respiratory distress. Heart RRR. Lungs diminished throughout. Abdomen soft, nondistended, nontender to palpation in all quadrants.  3+ pitting edema symmetric lower extremities, edema noted in upper extremities, appears anasarcic.    Of note, patient's primary language is Uzbek.  Her spouse is present at bedside acting as , patient denied  phone when offered and preferred to speak through her spouse.    EKG  EKG was reviewed by emergency department physician in the absence of a cardiologist    Narrow complex sinus rhythm, rate 76, normal axis, normal RI and QRS intervals, normal Qtc, no ST elevations or depressions, normal t-wave morphology, impression NSR, no STEMI      Screenings   Dominga Coma Scale  Eye Opening: Spontaneous  Best Verbal Response: Oriented  Best Motor Response: Obeys commands  Dominga Coma Scale Score: 15      Is this patient to be included in the SEP-1 Core Measure due to severe sepsis or septic shock?   No   Exclusion criteria - the patient is NOT to be included for SEP-1 Core Measure due to:  Infection is not suspected      MDM    Patient afebrile however ill-appearing on initial evaluation.  She is alert and protecting her airway, however appears tachypneic and in respiratory distress.  She is not hypoxic currently.  EKG no STEMI, troponin minimally  considerations are listed above in the medical decision making.      Patient Referrals:  No follow-up provider specified.    Discharge Medications:  Current Discharge Medication List          FINAL IMPRESSION  1. Acute on chronic congestive heart failure, unspecified heart failure type (HCC)    2. Hypercapnia        Blood pressure 117/82, pulse 69, temperature 97.6 °F (36.4 °C), temperature source Oral, resp. rate 18, weight 97.6 kg (215 lb 1.6 oz), SpO2 100 %, not currently breastfeeding.     For further details of Crow Bonner's emergency department encounter, please see documentation by advanced practice provider, ALBERTO Sanchez.    Guido Sims DO (electronically signed)  Attending Emergency Physician       Guido Sims DO  03/07/24 1130

## 2024-03-07 NOTE — ED NOTES
ED TO INPATIENT SBAR HANDOFF    Patient Name: Crow Bonner   :  1970  53 y.o.   MRN:  [id number]  Preferred Name  Crow  ED Room #:  ED-0021/21  Family/Caregiver Present yes   Restraints no   Sitter no   Sepsis Risk Score Sepsis Risk Score: 1.23    Situation  Code Status: Prior No additional code details.    Allergies: Iv dye [iodides], Diazepam, Insulin glargine, and Trazodone and nefazodone  Weight: Patient Vitals for the past 96 hrs (Last 3 readings):   Weight   24 1700 97.6 kg (215 lb 1.6 oz)     Arrived from: home  Chief Complaint:   Chief Complaint   Patient presents with    Shortness of Breath     CHF, increased swelling. On 2.5 liters O2 at home. Decreased urination.      Hospital Problem/Diagnosis:  Active Problems:    * No active hospital problems. *  Resolved Problems:    * No resolved hospital problems. *    Imaging:   XR CHEST PORTABLE   Final Result   No acute cardiopulmonary process.           Abnormal labs:   Abnormal Labs Reviewed   CBC WITH AUTO DIFFERENTIAL - Abnormal; Notable for the following components:       Result Value    Hemoglobin 11.7 (*)     Hematocrit 35.4 (*)     All other components within normal limits   COMPREHENSIVE METABOLIC PANEL - Abnormal; Notable for the following components:    Chloride 96 (*)     CO2 34 (*)     Glucose 53 (*)     BUN 26 (*)     Creatinine 1.3 (*)     Est, Glom Filt Rate 49 (*)     All other components within normal limits   TROPONIN - Abnormal; Notable for the following components:    Troponin, High Sensitivity 17 (*)     All other components within normal limits   BRAIN NATRIURETIC PEPTIDE - Abnormal; Notable for the following components:    Pro- (*)     All other components within normal limits   BLOOD GAS, VENOUS - Abnormal; Notable for the following components:    pCO2, Jurgen 66.5 (*)     pO2, Jurgen 58.2 (*)     HCO3, Venous 37.1 (*)     Base Excess, Jurgen 9.3 (*)     Carboxyhemoglobin 1.8 (*)     All other components within normal

## 2024-03-07 NOTE — H&P
HISTORY & PHYSICAL        Date of Admission:  03/06/24  MRN: [id number]  Date of Service: 03/06/24  Location:  [facility]       CC:    Chief Complaint   Patient presents with    Shortness of Breath     CHF, increased swelling. On 2.5 liters O2 at home. Decreased urination.         HISTORY OF PRESENT ILLNESS:     Crow Bonner is a 53 y.o. female with a PMHx of HTN, HLD, asthma/COPD, IDDM2, CAD, ESBL, fibromyalgia, breast cancer (s/p mastectomy, now w/ LUE lymphedema), hypothyroid, LUIS (noncompliant w/ CPAP), and prior TIA who presented for evaluation of increasing shortness of breath and associated chest pain.  She also reports an 8 lb weight gain over the past several days.  She has had decreased urinary output today.  She is compliant with her diuretics and last received IV lasix infusion last week.  Lymphedema to the left upper extremity, although chronic, has somewhat worsened as well.  She denies any fevers, chills, N/V, or abdominal pain.        In the ER, pt normotensive, afebrile.  Labs significant for mildly elevated proBNP.  Cr 1.3.  VBG showed mild hypercapnia.  She was subsequently placed on BiPAP support.    Case discussed with ED JEFF for admission.  External medical records reviewed.      PAST MEDICAL HX:  Past Medical History:   Diagnosis Date    Acute CVA (cerebrovascular accident) ([facility]) 3/9/2023    Anxiety     Anxiety and depression     Arthritis     not sure of specific type    Asthma     CAD (coronary artery disease)     Cancer ([facility]) 2007    left breast    Cerebral artery occlusion with cerebral infarction ([facility])     2000, 2001    CHF (congestive heart failure) ([facility]) 2018    Chronic kidney disease     renal insufficency/to see Dr Romario Saeed    Chronic pain     Constipation     COPD (chronic obstructive pulmonary disease) ([facility])     torsemide (DEMADEX) 100 MG tablet Take 100mg in the AM and 50mg in the PM on ALL other days. 3/6/24   Jessa Parsons MD   montelukast (SINGULAIR) 10 MG tablet TAKE 1 TABLET BY MOUTH AT NIGHT 3/6/24   Dior Olivo MD   LEVEMIR FLEXPEN 100 UNIT/ML injection pen Inject 200 units once daily 2/20/24   Lizzy Taylor MD   Semaglutide,0.25 or 0.5MG/DOS, (OZEMPIC, 0.25 OR 0.5 MG/DOSE,) 2 MG/3ML SOPN Inject 0.25 mg once a week for 4 weeks and then increase to 0.5 mg once a week 2/20/24   Lizzy Taylor MD   vitamin B-12 (CYANOCOBALAMIN) 1000 MCG tablet Take 1 tablet by mouth daily    ProviderSummer MD   folic acid (FOLVITE) 1 MG tablet TAKE 1 TABLET BY MOUTH EVERY DAY 2/7/24   Mariana Simental DO   Insulin Degludec (TRESIBA FLEXTOUCH) 200 UNIT/ML SOPN INJECT 200 UNITS SUBCUTANEOUSLY ONE TIME DAILY 2/1/24   Lizzy Taylor MD   tamsulosin (FLOMAX) 0.4 MG capsule TAKE 1 CAPSULE BY MOUTH AT BEDTIME 1/31/24   Mariana Simental DO   lubiprostone (AMITIZA) 24 MCG capsule TAKE 1 CAPSULE BY MOUTH TWO TIMES A DAY WITH MEALS 1/29/24   Mariana Simental DO   butalbital-acetaminophen-caffeine (FIORICET, ESGIC) -40 MG per tablet Take 1 tablet by mouth every 6 hours as needed for Headaches 1/23/24   Mariana Simental DO   mineral oil-hydrophilic petrolatum (HYDROPHOR) ointment Apply topically 4 times daily as needed to hands and feet. 1/23/24   Mariana Simental DO   omeprazole (PRILOSEC) 20 MG delayed release capsule TAKE 1 CAPSULE BY MOUTH 2 TIMES A DAY 12/27/23   Mariana Simental DO   STEGLATRO 5 MG TABS TAKE 1 TABLET BY MOUTH EVERY DAY 12/13/23   Lizzy Taylor MD   dulaglutide (TRULICITY) 1.5 MG/0.5ML SC injection Use as directed 11/7/23   Lizzy Taylor MD   metoprolol tartrate (LOPRESSOR) 25 MG tablet TAKE 1 TABLET BY MOUTH TWO TIMES A DAY 11/6/23   Jessa Parsons MD   Semaglutide,0.25 or 0.5MG/DOS, (OZEMPIC, 0.25 OR 0.5 MG/DOSE,) 2 MG/3ML SOPN Inject 0.5 mg weekly 11/2/23   Lizzy Taylor MD   rosuvastatin

## 2024-03-08 ENCOUNTER — TELEPHONE (OUTPATIENT)
Dept: CARDIOLOGY CLINIC | Age: 54
End: 2024-03-08

## 2024-03-08 PROBLEM — E11.69 TYPE 2 DIABETES MELLITUS WITH OTHER SPECIFIED COMPLICATION (HCC): Status: ACTIVE | Noted: 2018-01-12

## 2024-03-08 PROBLEM — D64.9 ANEMIA: Status: ACTIVE | Noted: 2024-03-08

## 2024-03-08 LAB
ALBUMIN SERPL-MCNC: 3.7 G/DL (ref 3.4–5)
ANION GAP SERPL CALCULATED.3IONS-SCNC: 8 MMOL/L (ref 3–16)
BASOPHILS # BLD: 0.1 K/UL (ref 0–0.2)
BASOPHILS NFR BLD: 0.8 %
BUN SERPL-MCNC: 24 MG/DL (ref 7–20)
CALCIUM SERPL-MCNC: 9 MG/DL (ref 8.3–10.6)
CHLORIDE SERPL-SCNC: 93 MMOL/L (ref 99–110)
CO2 SERPL-SCNC: 37 MMOL/L (ref 21–32)
CREAT SERPL-MCNC: 1.2 MG/DL (ref 0.6–1.1)
DEPRECATED RDW RBC AUTO: 15.2 % (ref 12.4–15.4)
EKG ATRIAL RATE: 76 BPM
EKG DIAGNOSIS: NORMAL
EKG P AXIS: 23 DEGREES
EKG P-R INTERVAL: 140 MS
EKG Q-T INTERVAL: 404 MS
EKG QRS DURATION: 78 MS
EKG QTC CALCULATION (BAZETT): 454 MS
EKG R AXIS: 45 DEGREES
EKG T AXIS: 49 DEGREES
EKG VENTRICULAR RATE: 76 BPM
EOSINOPHIL # BLD: 0.1 K/UL (ref 0–0.6)
EOSINOPHIL NFR BLD: 1.9 %
FERRITIN SERPL IA-MCNC: 225.3 NG/ML (ref 15–150)
GFR SERPLBLD CREATININE-BSD FMLA CKD-EPI: 53 ML/MIN/{1.73_M2}
GLUCOSE BLD-MCNC: 219 MG/DL (ref 70–99)
GLUCOSE BLD-MCNC: 225 MG/DL (ref 70–99)
GLUCOSE BLD-MCNC: 227 MG/DL (ref 70–99)
GLUCOSE BLD-MCNC: 251 MG/DL (ref 70–99)
GLUCOSE BLD-MCNC: 328 MG/DL (ref 70–99)
GLUCOSE SERPL-MCNC: 223 MG/DL (ref 70–99)
HCT VFR BLD AUTO: 34.9 % (ref 36–48)
HGB BLD-MCNC: 11.3 G/DL (ref 12–16)
IRON SATN MFR SERPL: 23 % (ref 15–50)
IRON SERPL-MCNC: 48 UG/DL (ref 37–145)
LYMPHOCYTES # BLD: 2 K/UL (ref 1–5.1)
LYMPHOCYTES NFR BLD: 26.1 %
MAGNESIUM SERPL-MCNC: 2.2 MG/DL (ref 1.8–2.4)
MCH RBC QN AUTO: 28 PG (ref 26–34)
MCHC RBC AUTO-ENTMCNC: 32.4 G/DL (ref 31–36)
MCV RBC AUTO: 86.6 FL (ref 80–100)
MONOCYTES # BLD: 0.6 K/UL (ref 0–1.3)
MONOCYTES NFR BLD: 7.6 %
NEUTROPHILS # BLD: 4.8 K/UL (ref 1.7–7.7)
NEUTROPHILS NFR BLD: 63.6 %
PERFORMED ON: ABNORMAL
PHOSPHATE SERPL-MCNC: 3.9 MG/DL (ref 2.5–4.9)
PLATELET # BLD AUTO: 220 K/UL (ref 135–450)
PMV BLD AUTO: 9.2 FL (ref 5–10.5)
POTASSIUM SERPL-SCNC: 3.9 MMOL/L (ref 3.5–5.1)
RBC # BLD AUTO: 4.03 M/UL (ref 4–5.2)
SODIUM SERPL-SCNC: 138 MMOL/L (ref 136–145)
TIBC SERPL-MCNC: 210 UG/DL (ref 260–445)
WBC # BLD AUTO: 7.6 K/UL (ref 4–11)

## 2024-03-08 PROCEDURE — 94660 CPAP INITIATION&MGMT: CPT

## 2024-03-08 PROCEDURE — 82728 ASSAY OF FERRITIN: CPT

## 2024-03-08 PROCEDURE — 6370000000 HC RX 637 (ALT 250 FOR IP): Performed by: CLINICAL NURSE SPECIALIST

## 2024-03-08 PROCEDURE — 6370000000 HC RX 637 (ALT 250 FOR IP): Performed by: INTERNAL MEDICINE

## 2024-03-08 PROCEDURE — 83540 ASSAY OF IRON: CPT

## 2024-03-08 PROCEDURE — 51798 US URINE CAPACITY MEASURE: CPT

## 2024-03-08 PROCEDURE — 6370000000 HC RX 637 (ALT 250 FOR IP): Performed by: STUDENT IN AN ORGANIZED HEALTH CARE EDUCATION/TRAINING PROGRAM

## 2024-03-08 PROCEDURE — 99233 SBSQ HOSP IP/OBS HIGH 50: CPT | Performed by: CLINICAL NURSE SPECIALIST

## 2024-03-08 PROCEDURE — 2060000000 HC ICU INTERMEDIATE R&B

## 2024-03-08 PROCEDURE — 94761 N-INVAS EAR/PLS OXIMETRY MLT: CPT

## 2024-03-08 PROCEDURE — 85025 COMPLETE CBC W/AUTO DIFF WBC: CPT

## 2024-03-08 PROCEDURE — 80069 RENAL FUNCTION PANEL: CPT

## 2024-03-08 PROCEDURE — 6370000000 HC RX 637 (ALT 250 FOR IP)

## 2024-03-08 PROCEDURE — 83550 IRON BINDING TEST: CPT

## 2024-03-08 PROCEDURE — 6370000000 HC RX 637 (ALT 250 FOR IP): Performed by: PHYSICIAN ASSISTANT

## 2024-03-08 PROCEDURE — 94640 AIRWAY INHALATION TREATMENT: CPT

## 2024-03-08 PROCEDURE — 93010 ELECTROCARDIOGRAM REPORT: CPT | Performed by: INTERNAL MEDICINE

## 2024-03-08 PROCEDURE — 83735 ASSAY OF MAGNESIUM: CPT

## 2024-03-08 PROCEDURE — 6370000000 HC RX 637 (ALT 250 FOR IP): Performed by: NURSE PRACTITIONER

## 2024-03-08 PROCEDURE — 2700000000 HC OXYGEN THERAPY PER DAY

## 2024-03-08 RX ORDER — TORSEMIDE 100 MG/1
50 TABLET ORAL EVERY EVENING
Status: DISCONTINUED | OUTPATIENT
Start: 2024-03-08 | End: 2024-03-09 | Stop reason: HOSPADM

## 2024-03-08 RX ORDER — INSULIN LISPRO 100 [IU]/ML
5 INJECTION, SOLUTION INTRAVENOUS; SUBCUTANEOUS
Status: DISCONTINUED | OUTPATIENT
Start: 2024-03-08 | End: 2024-03-09 | Stop reason: HOSPADM

## 2024-03-08 RX ORDER — TORSEMIDE 100 MG/1
100 TABLET ORAL
Status: DISCONTINUED | OUTPATIENT
Start: 2024-03-09 | End: 2024-03-09 | Stop reason: HOSPADM

## 2024-03-08 RX ORDER — SENNA AND DOCUSATE SODIUM 50; 8.6 MG/1; MG/1
2 TABLET, FILM COATED ORAL DAILY
Status: DISCONTINUED | OUTPATIENT
Start: 2024-03-08 | End: 2024-03-09 | Stop reason: HOSPADM

## 2024-03-08 RX ORDER — INSULIN GLARGINE 100 [IU]/ML
25 INJECTION, SOLUTION SUBCUTANEOUS NIGHTLY
Status: DISCONTINUED | OUTPATIENT
Start: 2024-03-08 | End: 2024-03-09 | Stop reason: HOSPADM

## 2024-03-08 RX ORDER — SPIRONOLACTONE 25 MG/1
50 TABLET ORAL 2 TIMES DAILY
Status: DISCONTINUED | OUTPATIENT
Start: 2024-03-08 | End: 2024-03-09 | Stop reason: HOSPADM

## 2024-03-08 RX ORDER — TAMSULOSIN HYDROCHLORIDE 0.4 MG/1
0.4 CAPSULE ORAL DAILY
Status: DISCONTINUED | OUTPATIENT
Start: 2024-03-08 | End: 2024-03-09 | Stop reason: HOSPADM

## 2024-03-08 RX ADMIN — INSULIN GLARGINE 25 UNITS: 100 INJECTION, SOLUTION SUBCUTANEOUS at 21:14

## 2024-03-08 RX ADMIN — SACUBITRIL AND VALSARTAN 0.5 TABLET: 24; 26 TABLET, FILM COATED ORAL at 21:13

## 2024-03-08 RX ADMIN — STANDARDIZED SENNA CONCENTRATE AND DOCUSATE SODIUM 2 TABLET: 8.6; 5 TABLET ORAL at 12:10

## 2024-03-08 RX ADMIN — IPRATROPIUM BROMIDE AND ALBUTEROL SULFATE 1 DOSE: .5; 3 SOLUTION RESPIRATORY (INHALATION) at 12:51

## 2024-03-08 RX ADMIN — BENZTROPINE MESYLATE 2 MG: 1 TABLET ORAL at 09:18

## 2024-03-08 RX ADMIN — EMPAGLIFLOZIN 10 MG: 10 TABLET, FILM COATED ORAL at 09:18

## 2024-03-08 RX ADMIN — SACUBITRIL AND VALSARTAN 0.5 TABLET: 24; 26 TABLET, FILM COATED ORAL at 12:10

## 2024-03-08 RX ADMIN — CLOPIDOGREL BISULFATE 75 MG: 75 TABLET ORAL at 09:18

## 2024-03-08 RX ADMIN — GUAIFENESIN 600 MG: 600 TABLET, EXTENDED RELEASE ORAL at 21:14

## 2024-03-08 RX ADMIN — TAMSULOSIN HYDROCHLORIDE 0.4 MG: 0.4 CAPSULE ORAL at 09:19

## 2024-03-08 RX ADMIN — INSULIN LISPRO 4 UNITS: 100 INJECTION, SOLUTION INTRAVENOUS; SUBCUTANEOUS at 09:21

## 2024-03-08 RX ADMIN — DULOXETINE HYDROCHLORIDE 60 MG: 60 CAPSULE, DELAYED RELEASE ORAL at 21:14

## 2024-03-08 RX ADMIN — GUAIFENESIN 600 MG: 600 TABLET, EXTENDED RELEASE ORAL at 09:18

## 2024-03-08 RX ADMIN — INSULIN LISPRO 4 UNITS: 100 INJECTION, SOLUTION INTRAVENOUS; SUBCUTANEOUS at 16:06

## 2024-03-08 RX ADMIN — METOPROLOL TARTRATE 25 MG: 25 TABLET, FILM COATED ORAL at 21:14

## 2024-03-08 RX ADMIN — IPRATROPIUM BROMIDE AND ALBUTEROL SULFATE 1 DOSE: .5; 3 SOLUTION RESPIRATORY (INHALATION) at 07:59

## 2024-03-08 RX ADMIN — ASPIRIN 81 MG: 81 TABLET, COATED ORAL at 09:18

## 2024-03-08 RX ADMIN — INSULIN LISPRO 5 UNITS: 100 INJECTION, SOLUTION INTRAVENOUS; SUBCUTANEOUS at 16:06

## 2024-03-08 RX ADMIN — INSULIN LISPRO 5 UNITS: 100 INJECTION, SOLUTION INTRAVENOUS; SUBCUTANEOUS at 12:14

## 2024-03-08 RX ADMIN — LEVOTHYROXINE SODIUM 150 MCG: 150 TABLET ORAL at 05:53

## 2024-03-08 RX ADMIN — SPIRONOLACTONE 50 MG: 25 TABLET ORAL at 16:07

## 2024-03-08 RX ADMIN — DULOXETINE HYDROCHLORIDE 60 MG: 60 CAPSULE, DELAYED RELEASE ORAL at 09:18

## 2024-03-08 RX ADMIN — OXYCODONE AND ACETAMINOPHEN 1 TABLET: 10; 325 TABLET ORAL at 12:12

## 2024-03-08 RX ADMIN — IPRATROPIUM BROMIDE AND ALBUTEROL SULFATE 1 DOSE: .5; 3 SOLUTION RESPIRATORY (INHALATION) at 20:42

## 2024-03-08 RX ADMIN — METOPROLOL TARTRATE 25 MG: 25 TABLET, FILM COATED ORAL at 09:18

## 2024-03-08 RX ADMIN — OXYCODONE HYDROCHLORIDE 20 MG: 10 TABLET, FILM COATED, EXTENDED RELEASE ORAL at 09:18

## 2024-03-08 RX ADMIN — TORSEMIDE 50 MG: 100 TABLET ORAL at 16:08

## 2024-03-08 RX ADMIN — SPIRONOLACTONE 25 MG: 25 TABLET ORAL at 09:19

## 2024-03-08 RX ADMIN — OXYCODONE HYDROCHLORIDE 20 MG: 10 TABLET, FILM COATED, EXTENDED RELEASE ORAL at 21:14

## 2024-03-08 RX ADMIN — INSULIN LISPRO 12 UNITS: 100 INJECTION, SOLUTION INTRAVENOUS; SUBCUTANEOUS at 12:14

## 2024-03-08 ASSESSMENT — PAIN SCALES - WONG BAKER
WONGBAKER_NUMERICALRESPONSE: 8

## 2024-03-08 ASSESSMENT — PAIN SCALES - GENERAL
PAINLEVEL_OUTOF10: 0
PAINLEVEL_OUTOF10: 6
PAINLEVEL_OUTOF10: 5
PAINLEVEL_OUTOF10: 0
PAINLEVEL_OUTOF10: 7
PAINLEVEL_OUTOF10: 0
PAINLEVEL_OUTOF10: 3
PAINLEVEL_OUTOF10: 4

## 2024-03-08 ASSESSMENT — PAIN DESCRIPTION - LOCATION
LOCATION: GENERALIZED
LOCATION: ARM;BACK;LEG

## 2024-03-08 NOTE — TELEPHONE ENCOUNTER
Submitted PA for ENTRESTO  [facility] [id number] STATUS: PENDING.    Follow up done daily; if no decision with in three days we will refax.  If another three days goes by with no decision will call the insurance for status.

## 2024-03-08 NOTE — PLAN OF CARE
Problem: Discharge Planning  Goal: Discharge to home or other facility with appropriate resources  3/8/2024 0705 by Rikki Luz RN  Outcome: Progressing     Problem: Pain  Goal: Verbalizes/displays adequate comfort level or baseline comfort level  3/8/2024 1631 by Angelo Mark RN  Outcome: Progressing  3/8/2024 0705 by Rikki Luz RN  Outcome: Progressing     Problem: Safety - Adult  Goal: Free from fall injury  3/8/2024 1631 by Angelo Mark RN  Outcome: Progressing  3/8/2024 0705 by Rikki Luz RN  Outcome: Progressing     Problem: Respiratory - Adult  Goal: Achieves optimal ventilation and oxygenation  3/8/2024 1631 by Angelo Mark RN  Outcome: Progressing  3/8/2024 0705 by Rikki Luz RN  Outcome: Progressing     Problem: Cardiovascular - Adult  Goal: Maintains optimal cardiac output and hemodynamic stability  3/8/2024 1631 by Angelo Mark RN  Outcome: Progressing  3/8/2024 0705 by Rikki Luz RN  Outcome: Progressing  Goal: Absence of cardiac dysrhythmias or at baseline  3/8/2024 0705 by Rikki Luz RN  Outcome: Progressing     Problem: Skin/Tissue Integrity - Adult  Goal: Skin integrity remains intact  3/8/2024 0705 by Rikki Luz RN  Outcome: Progressing     Problem: Musculoskeletal - Adult  Goal: Return mobility to safest level of function  3/8/2024 0705 by Rikki Luz RN  Outcome: Progressing     Problem: Gastrointestinal - Adult  Goal: Minimal or absence of nausea and vomiting  3/8/2024 1631 by Angelo Mark RN  Outcome: Progressing  3/8/2024 0705 by Rikki Luz RN  Outcome: Progressing     Problem: Genitourinary - Adult  Goal: Absence of urinary retention  3/8/2024 1631 by Angelo Mark RN  Outcome: Progressing  3/8/2024 0705 by Rikki Luz RN  Outcome: Progressing     Problem: Infection - Adult  Goal: Absence of infection at discharge  3/8/2024 1631 by Angelo Mark RN  Outcome: Progressing  3/8/2024 0705 by Rikki Luz RN  Outcome: Progressing  Goal: Absence of infection during  hospitalization  3/8/2024 0705 by Rikki Luz RN  Outcome: Progressing     Problem: Metabolic/Fluid and Electrolytes - Adult  Goal: Electrolytes maintained within normal limits  3/8/2024 1631 by Angelo Mark RN  Outcome: Progressing  3/8/2024 0705 by Rikki Luz RN  Outcome: Progressing  Goal: Hemodynamic stability and optimal renal function maintained  3/8/2024 0705 by Rikki Luz RN  Outcome: Progressing  Goal: Glucose maintained within prescribed range  3/8/2024 0705 by Rikki Luz RN  Outcome: Progressing     Problem: Hematologic - Adult  Goal: Maintains hematologic stability  3/8/2024 0705 by Rikki Luz RN  Outcome: Progressing     Problem: Chronic Conditions and Co-morbidities  Goal: Patient's chronic conditions and co-morbidity symptoms are monitored and maintained or improved  Outcome: Progressing   CHF exacerbation, pt on lasix gtt, FR and low sodium diet, aldactone, jardiance and entresto prescribed, jamison placed for urinary retention and to protect skin, removed jamison for infection prevention.

## 2024-03-08 NOTE — PROGRESS NOTES
CLINICAL PHARMACY NOTE: MEDS TO BEDS    Total # of Prescriptions Filled: 2   The following medications were delivered to the patient:  ENTRESTO 24-26MG  JARDIANCE 10MG    Additional Documentation:  Delivered to KARY Wellington = signed  Sharee Youngblood - Pharmacy Tech.

## 2024-03-08 NOTE — PROGRESS NOTES
C[place] Removed. Pt tolerated very well with no complications. Ext. Catheter placed connected to suction.

## 2024-03-08 NOTE — TELEPHONE ENCOUNTER
FYI I have been unable to reach patient, Epic shows she is currently admitted for cardiac related issues.

## 2024-03-08 NOTE — CARE COORDINATION
Wound consult requested for moisture associated skin damage between fingers on left had.  Patient has hx of breast ca, masectomy and lymphedema to left arm.  Recommend cleansing skin with soap and water, applying barrier cream like zinc, between fingers and to place dry gauze to absorb moisture. Change twice a day and as needed. Order placed. CARL DAIGLEN, RN, CWOCN  Inpatient  Wound/Ostomy Care  [phone]

## 2024-03-08 NOTE — PROGRESS NOTES
[facility]   Daily Progress Note      Admit Date:  3/6/2024    HPI:    Ms. Bonner is a 53 year old female with history of chronic diastolic heart failure, on 2.5 L of oxygen chronically at home, severe lymphedema of the left upper extremity status post mastectomy, CAD, severely uncontrolled DM (follows with Dr Taylor), [facility] with LAD disease and small vessel disease in the RCA and LCX, LUIS and non compliant with cpap    She presented to the hospital with shortness of breath and chest pain.  Her weight increased by 8 pounds and was not able to urinate.  She receives IV lasix in the infusion center every 1-2 weeks for the past 6 months.  She was started on IV lasix drip      Subjective:  Patient is being seen for acute on chronic diastolic heart failure. There were no acute overnight cardiac events. She is sitting in the chair on 2.5 L of oxygen.  She had 9 L of fluid out yesterday via jamison.  Her weight on standup scale by me today is 200.  She feels much better.  Her  is at her side.  Discussed about medication changes.  Will remove jamison to see if she can urinate and if urology consult is needed.  Creat 1.2 and -8L out weight 215-201    Objective:   BP (!) 120/57   Pulse 82   Temp 97.3 °F (36.3 °C) (Temporal)   Resp 16   Ht 1.626 m (5' 4\")   Wt 91.5 kg (201 lb 11.2 oz)   SpO2 97%   BMI 34.62 kg/m²     Intake/Output Summary (Last 24 hours) at 3/8/2024 1020  Last data filed at 3/8/2024 0556  Gross per 24 hour   Intake 566.54 ml   Output 4750 ml   Net -4183.46 ml          Physical Exam:  General:  Awake, alert, oriented in NAD  Skin:  Warm and dry.  No unusual bruising or rash  Neck:  Supple.  No JVD or carotid bruit appreciated  Chest:  Normal effort.  Clear to auscultation, no wheezes/rhonchi/rales  Cardiovascular:  RRR, S1/S2, no murmur/gallop/rub  Abdomen:  Soft, nontender, +bowel sounds  Extremities:  No edema; left arm chronic edema  Neurological: No focal deficits  Psychological:

## 2024-03-08 NOTE — PROGRESS NOTES
!None      ! +------------------------+----------+---------------+----------+ !Prox Femoral            !Yes       !Yes            !None      ! +------------------------+----------+---------------+----------+ !Mid Femoral             !Yes       !Yes            !None      ! +------------------------+----------+---------------+----------+ !Dist Femoral            !Yes       !Yes            !None      ! +------------------------+----------+---------------+----------+ !Deep Femoral            !Yes       !Yes            !None      ! +------------------------+----------+---------------+----------+ !Popliteal               !Yes       !Yes            !None      ! +------------------------+----------+---------------+----------+ !GSV Below Knee          !Yes       !Yes            !None      ! +------------------------+----------+---------------+----------+ !Gastroc                 !Yes       !Yes            !None      ! +------------------------+----------+---------------+----------+ !Soleal                  !Yes       !Yes            !None      ! +------------------------+----------+---------------+----------+ !PTV                     !Yes       !Yes            !None      ! +------------------------+----------+---------------+----------+ !Peroneal                !Yes       !Yes            !None      ! +------------------------+----------+---------------+----------+ !GSV Calf                !Yes       !Yes            !None      ! +------------------------+----------+---------------+----------+ !SSV                     !Yes       !Yes            !None      ! +------------------------+----------+---------------+----------+ Right Doppler Measurements +------------------------+---------+------+------------+ !Location                !Signal   !Reflux!Reflux (sec)! +------------------------+---------+------+------------+ !Sapheno Femoral Junction!Pulsatile!No    !            !  +------------------------+---------+------+------------+ !Common Femoral          !Pulsatile!No    !            ! +------------------------+---------+------+------------+ !Femoral                 !Phasic   !      !            ! +------------------------+---------+------+------------+ !Deep Femoral            !Phasic   !No    !            ! +------------------------+---------+------+------------+ !Popliteal               !Pulsatile!No    !            ! +------------------------+---------+------+------------+ Left Lower Extremities DVT Study Measurements Left 2D Measurements +------------------------+----------+---------------+----------+ !Location                !Visualized!Compressibility!Thrombosis! +------------------------+----------+---------------+----------+ !Sapheno Femoral Junction!Yes       !Yes            !None      ! +------------------------+----------+---------------+----------+ !Common Femoral          !Yes       !Yes            !None      ! +------------------------+----------+---------------+----------+ Left Doppler Measurements +--------------+---------+------+------------+ !Location      !Signal   !Reflux!Reflux (sec)! +--------------+---------+------+------------+ !Common Femoral!Pulsatile!No    !            ! +--------------+---------+------+------------+    XR CHEST PORTABLE    Result Date: 3/6/2024  EXAMINATION: ONE XRAY VIEW OF THE CHEST 3/6/2024 5:18 pm COMPARISON: 04/11/2023 HISTORY: ORDERING SYSTEM PROVIDED HISTORY: SOB TECHNOLOGIST PROVIDED HISTORY: Reason for exam:->SOB Reason for Exam: sob FINDINGS: There is a port in place with distal tip overlying the superior vena cava. The lungs are otherwise clear.  The heart appears unremarkable.  Bony structures appear normal.     No acute cardiopulmonary process.            Electronically signed by: Luis F Squires PA-C, 3/8/2024 8:41 AM  [facility]  Office Contact: [phone]

## 2024-03-08 NOTE — CONSULTS
Nutrition Education    Language services was used for this education. Provided heart failure diet education.  Education included low sodium diet guidelines (3-4 gm Na+/day) and fluid restriction (64 oz/day).  Reviewed foods to choose and foods to avoid, along with label reading and ways to add flavor to food. Pt is currently on a 2 g sodium diet with 1000 ml fluid restriction.  Pt states it is difficult for her to keep fluid intake within 1000 ml d/t thirst. Recommended using ice cubes to keep mouth moist.  Pt currently tries to follow a low sodium diet at home and does not add salt to food.  Pt states understanding of education.  Time spent with patient: 20 minutes.     Educated on CHF diet  Learners: Patient and Family  Readiness: Acceptance  Method: Explanation and Handout  Response: Verbalizes Understanding  Contact name and number provided.    Rodriguez Figueredo RD  Contact Number: [phone]

## 2024-03-08 NOTE — PROGRESS NOTES
Venous Left   Final Result      VL Extremity Venous Right   Final Result      XR CHEST PORTABLE   Final Result   No acute cardiopulmonary process.             Medications:     Scheduled Meds:   tamsulosin  0.4 mg Oral Daily    sennosides-docusate sodium  2 tablet Oral Daily    sacubitril-valsartan  0.5 tablet Oral BID    [START ON 3/9/2024] torsemide  100 mg Oral QAM AC    torsemide  50 mg Oral QPM    spironolactone  50 mg Oral BID    insulin lispro  5 Units SubCUTAneous TID WC    insulin glargine  25 Units SubCUTAneous Nightly    oxyCODONE  20 mg Oral BID    guaiFENesin  600 mg Oral BID    insulin regular  10 Units SubCUTAneous Once    insulin lispro  0-16 Units SubCUTAneous TID WC    insulin lispro  0-4 Units SubCUTAneous Nightly    empagliflozin  10 mg Oral Daily    aspirin  81 mg Oral Daily    benztropine  2 mg Oral Daily    brexpiprazole  4 mg Oral Nightly    clopidogrel  75 mg Oral Daily    DULoxetine  60 mg Oral BID    levothyroxine  150 mcg Oral QAM AC    metoprolol tartrate  25 mg Oral BID    ipratropium 0.5 mg-albuterol 2.5 mg  1 Dose Inhalation TID RT     Continuous Infusions:   dextrose       PRN Meds:acetaminophen, dextrose bolus **OR** dextrose bolus, glucagon (rDNA), dextrose, albuterol, oxyCODONE-acetaminophen, ipratropium 0.5 mg-albuterol 2.5 mg, nitroGLYCERIN

## 2024-03-08 NOTE — PROGRESS NOTES
Admit Date: 3/6/2024  Diet: ADULT DIET; Regular; 4 carb choices (60 gm/meal); Low Sodium (2 gm); 1000 ml; No Pork    CC: Fluid overload    Interval history:   Overnight, there were no acute events. Patient's vitals remained stable    Patient was seen this morning in bed with her  at bedside. Patient endorsed that she came in with worsening SOB and LE edema.  Patient denies fevers, chills, nausea, vomiting, chest pain, diarrhea, constipation, dysuria, urinary frequency or urgency.     Plan:     -Continue Lasix gtt  -Strict I's and O's, daily standing weights, Lown Na diet  -Monitor Cr while diuresing  -Monitor and replace electrolytes  -Monitor glucose closely  -Echo pending    Assessment:   Crow Bonner is a 53 y.o. female with PMH of T2DM, Hypothyroidism, HFpEF who was admitted with HFpEF exacerbation    Acute on chronic heart failure w/ preserved EF  Patient presented with SOB and increased LE edema along with anasarca. She endorsed medical and dietary compliance  -Cardiology consulted, appreciate recs    Urinary retention?  Patient in the ED was found ot be retaining urine and bladder scan showed 680ml. A jamison was placed  -Urology consulted, appreciate recs    Chronic kidney disease, stage 3  Patient's baseline Cr ~ 1.3-1.4    T2DM - Uncontrolled  Patient's last A1c = 7.4%. At home patient is on very high doses of insulin along with Ozempic, Mounjaro, Trulicity    LUIS  At home, patient is on BiPAP    Chronic RF  Patient is on 2L at baseline.     Hypothyroid  At home, patient is on Synthroid    Breast CA w/ LUE lymphedema  Per oncology notes, \"Patient had invasive ductal carcinoma of the left breast in 2007 at the age of 37. Stage IIA ([id number]). She received neoadjuvant adriamycin,cytoxan and taxotere. She had mastectomy of the left breast on 7/23/2008. This was followed by radiation. She completed 10 years of  tamoxifen therapy in 2018.\"    Code Status: Full Code   FEN: ADULT DIET; Regular; 4

## 2024-03-08 NOTE — CARE COORDINATION
Discharge Planning Note:    Chart reviewed for discharge needs and it appears that patient has minimal needs for discharge at this time.   Risk Score 11 %     Primary Care Physician is Mariana Simental,    Primary insurance is [facility]    Please notify case management if any discharge needs are identified.      Case management will continue to follow progress and update discharge plan as needed.

## 2024-03-09 VITALS
OXYGEN SATURATION: 96 % | DIASTOLIC BLOOD PRESSURE: 63 MMHG | BODY MASS INDEX: 33.87 KG/M2 | SYSTOLIC BLOOD PRESSURE: 126 MMHG | TEMPERATURE: 98 F | WEIGHT: 198.41 LBS | RESPIRATION RATE: 18 BRPM | HEIGHT: 64 IN | HEART RATE: 79 BPM

## 2024-03-09 LAB
ALBUMIN SERPL-MCNC: 3.5 G/DL (ref 3.4–5)
ANION GAP SERPL CALCULATED.3IONS-SCNC: 8 MMOL/L (ref 3–16)
BASOPHILS # BLD: 0.1 K/UL (ref 0–0.2)
BASOPHILS NFR BLD: 0.7 %
BUN SERPL-MCNC: 28 MG/DL (ref 7–20)
CALCIUM SERPL-MCNC: 9.1 MG/DL (ref 8.3–10.6)
CHLORIDE SERPL-SCNC: 94 MMOL/L (ref 99–110)
CO2 SERPL-SCNC: 36 MMOL/L (ref 21–32)
CREAT SERPL-MCNC: 1.2 MG/DL (ref 0.6–1.1)
DEPRECATED RDW RBC AUTO: 14.5 % (ref 12.4–15.4)
EOSINOPHIL # BLD: 0.2 K/UL (ref 0–0.6)
EOSINOPHIL NFR BLD: 1.8 %
GFR SERPLBLD CREATININE-BSD FMLA CKD-EPI: 53 ML/MIN/{1.73_M2}
GLUCOSE BLD-MCNC: 211 MG/DL (ref 70–99)
GLUCOSE SERPL-MCNC: 183 MG/DL (ref 70–99)
HCT VFR BLD AUTO: 35.7 % (ref 36–48)
HGB BLD-MCNC: 11.6 G/DL (ref 12–16)
LYMPHOCYTES # BLD: 2.2 K/UL (ref 1–5.1)
LYMPHOCYTES NFR BLD: 23.2 %
MAGNESIUM SERPL-MCNC: 2.3 MG/DL (ref 1.8–2.4)
MCH RBC QN AUTO: 28.2 PG (ref 26–34)
MCHC RBC AUTO-ENTMCNC: 32.5 G/DL (ref 31–36)
MCV RBC AUTO: 86.9 FL (ref 80–100)
MONOCYTES # BLD: 0.9 K/UL (ref 0–1.3)
MONOCYTES NFR BLD: 8.9 %
NEUTROPHILS # BLD: 6.2 K/UL (ref 1.7–7.7)
NEUTROPHILS NFR BLD: 65.4 %
PERFORMED ON: ABNORMAL
PHOSPHATE SERPL-MCNC: 3.8 MG/DL (ref 2.5–4.9)
PLATELET # BLD AUTO: 226 K/UL (ref 135–450)
PMV BLD AUTO: 8.9 FL (ref 5–10.5)
POTASSIUM SERPL-SCNC: 3.9 MMOL/L (ref 3.5–5.1)
RBC # BLD AUTO: 4.11 M/UL (ref 4–5.2)
SODIUM SERPL-SCNC: 138 MMOL/L (ref 136–145)
WBC # BLD AUTO: 9.5 K/UL (ref 4–11)

## 2024-03-09 PROCEDURE — 2700000000 HC OXYGEN THERAPY PER DAY

## 2024-03-09 PROCEDURE — 83735 ASSAY OF MAGNESIUM: CPT

## 2024-03-09 PROCEDURE — 85025 COMPLETE CBC W/AUTO DIFF WBC: CPT

## 2024-03-09 PROCEDURE — 6370000000 HC RX 637 (ALT 250 FOR IP): Performed by: PHYSICIAN ASSISTANT

## 2024-03-09 PROCEDURE — 6370000000 HC RX 637 (ALT 250 FOR IP): Performed by: STUDENT IN AN ORGANIZED HEALTH CARE EDUCATION/TRAINING PROGRAM

## 2024-03-09 PROCEDURE — 94660 CPAP INITIATION&MGMT: CPT

## 2024-03-09 PROCEDURE — 80069 RENAL FUNCTION PANEL: CPT

## 2024-03-09 PROCEDURE — 6370000000 HC RX 637 (ALT 250 FOR IP): Performed by: NURSE PRACTITIONER

## 2024-03-09 PROCEDURE — 6370000000 HC RX 637 (ALT 250 FOR IP): Performed by: INTERNAL MEDICINE

## 2024-03-09 PROCEDURE — 6370000000 HC RX 637 (ALT 250 FOR IP): Performed by: CLINICAL NURSE SPECIALIST

## 2024-03-09 PROCEDURE — 94640 AIRWAY INHALATION TREATMENT: CPT

## 2024-03-09 PROCEDURE — 94761 N-INVAS EAR/PLS OXIMETRY MLT: CPT

## 2024-03-09 PROCEDURE — 6370000000 HC RX 637 (ALT 250 FOR IP)

## 2024-03-09 PROCEDURE — 36415 COLL VENOUS BLD VENIPUNCTURE: CPT

## 2024-03-09 RX ORDER — SPIRONOLACTONE 50 MG/1
50 TABLET, FILM COATED ORAL 2 TIMES DAILY
Qty: 180 TABLET | Refills: 0 | Status: SHIPPED | OUTPATIENT
Start: 2024-03-09 | End: 2024-06-07

## 2024-03-09 RX ORDER — TORSEMIDE 10 MG/1
50 TABLET ORAL EVERY EVENING
Qty: 450 TABLET | Refills: 0 | Status: SHIPPED | OUTPATIENT
Start: 2024-03-09 | End: 2024-06-07

## 2024-03-09 RX ADMIN — ASPIRIN 81 MG: 81 TABLET, COATED ORAL at 09:59

## 2024-03-09 RX ADMIN — INSULIN LISPRO 7 UNITS: 100 INJECTION, SOLUTION INTRAVENOUS; SUBCUTANEOUS at 09:57

## 2024-03-09 RX ADMIN — SPIRONOLACTONE 50 MG: 25 TABLET ORAL at 09:59

## 2024-03-09 RX ADMIN — TORSEMIDE 100 MG: 100 TABLET ORAL at 05:52

## 2024-03-09 RX ADMIN — METOPROLOL TARTRATE 25 MG: 25 TABLET, FILM COATED ORAL at 09:59

## 2024-03-09 RX ADMIN — DULOXETINE HYDROCHLORIDE 60 MG: 60 CAPSULE, DELAYED RELEASE ORAL at 09:59

## 2024-03-09 RX ADMIN — IPRATROPIUM BROMIDE AND ALBUTEROL SULFATE 1 DOSE: .5; 3 SOLUTION RESPIRATORY (INHALATION) at 07:53

## 2024-03-09 RX ADMIN — TAMSULOSIN HYDROCHLORIDE 0.4 MG: 0.4 CAPSULE ORAL at 09:58

## 2024-03-09 RX ADMIN — LEVOTHYROXINE SODIUM 150 MCG: 150 TABLET ORAL at 05:52

## 2024-03-09 RX ADMIN — EMPAGLIFLOZIN 10 MG: 10 TABLET, FILM COATED ORAL at 09:58

## 2024-03-09 RX ADMIN — STANDARDIZED SENNA CONCENTRATE AND DOCUSATE SODIUM 2 TABLET: 8.6; 5 TABLET ORAL at 09:59

## 2024-03-09 RX ADMIN — INSULIN LISPRO 5 UNITS: 100 INJECTION, SOLUTION INTRAVENOUS; SUBCUTANEOUS at 10:01

## 2024-03-09 RX ADMIN — GUAIFENESIN 600 MG: 600 TABLET, EXTENDED RELEASE ORAL at 09:58

## 2024-03-09 RX ADMIN — SACUBITRIL AND VALSARTAN 0.5 TABLET: 24; 26 TABLET, FILM COATED ORAL at 09:58

## 2024-03-09 RX ADMIN — OXYCODONE HYDROCHLORIDE 20 MG: 10 TABLET, FILM COATED, EXTENDED RELEASE ORAL at 09:59

## 2024-03-09 RX ADMIN — BENZTROPINE MESYLATE 2 MG: 1 TABLET ORAL at 09:58

## 2024-03-09 RX ADMIN — CLOPIDOGREL BISULFATE 75 MG: 75 TABLET ORAL at 09:59

## 2024-03-09 ASSESSMENT — PAIN SCALES - GENERAL
PAINLEVEL_OUTOF10: 0

## 2024-03-09 NOTE — PLAN OF CARE
Problem: Discharge Planning  Goal: Discharge to home or other facility with appropriate resources  Outcome: Progressing  Flowsheets (Taken 3/8/2024 2000)  Discharge to home or other facility with appropriate resources:   Identify barriers to discharge with patient and caregiver   Arrange for needed discharge resources and transportation as appropriate     Problem: Pain  Goal: Verbalizes/displays adequate comfort level or baseline comfort level  3/8/2024 2206 by Negrita Vasquez RN  Outcome: Progressing  3/8/2024 1631 by Angelo Mark RN  Outcome: Progressing     Problem: Safety - Adult  Goal: Free from fall injury  3/8/2024 2206 by Negrita Vasquez RN  Outcome: Progressing  3/8/2024 1631 by Angelo Mark RN  Outcome: Progressing     Problem: Respiratory - Adult  Goal: Achieves optimal ventilation and oxygenation  3/8/2024 2206 by Negrita Vasquez RN  Outcome: Progressing  Flowsheets (Taken 3/8/2024 2000)  Achieves optimal ventilation and oxygenation:   Assess for changes in respiratory status   Assess for changes in mentation and behavior  3/8/2024 1631 by Angelo Mark RN  Outcome: Progressing

## 2024-03-09 NOTE — DISCHARGE SUMMARY
V2.0  Discharge Summary    Name:  Crow Bonner /Age/Sex: 1970 (53 y.o. female)   Admit Date: 3/6/2024  Discharging Provider: Dhruv Rosa MD   MRN & CSN:  [id number] & [id number] Attending:  No att. providers found       Brief HPI: Crow Bonner is a 53 y.o. female with PMH of T2DM, Hypothyroidism, HFpEF who was admitted with HFpEF exacerbation. Patient was diuresed with a Lasix drip that was transitioned to p.o Torsemide. Patient's repeat echo showed an improvement in her EF to 60%. Her urinary retention improved after jamison was removed and she passed a voiding trial. Patient was hence found fit for discharge home.     Brief Problem Focused Hospital Course:     Acute on chronic heart failure w/ preserved EF  Patient presented with SOB and increased LE edema along with anasarca. She endorsed medical and dietary compliance  -Cardiology consulted, appreciate recs     Urinary retention?  Patient in the ED was found ot be retaining urine and bladder scan showed 680ml. A jamison was placed  -Urology consulted, appreciate recs     Chronic kidney disease, stage 3  Patient's baseline Cr ~ 1.3-1.4     T2DM - Uncontrolled  Patient's last A1c = 7.4%. At home patient is on very high doses of insulin along with Ozempic, Mounjaro, Trulicity     LUIS  At home, patient is on BiPAP     Chronic RF  Patient is on 2L at baseline.      Hypothyroid  At home, patient is on Synthroid     Breast CA w/ LUE lymphedema  Per oncology notes, \"Patient had invasive ductal carcinoma of the left breast in  at the age of 37. Stage IIA ([id number]). She received neoadjuvant adriamycin,cytoxan and taxotere. She had mastectomy of the left breast on 2008. This was followed by radiation. She completed 10 years of  tamoxifen therapy in 2018.\"    The patient expressed appropriate understanding of, and agreement with the discharge recommendations, medications, and plan.     Consults this admission:  IP CONSULT TO UROLOGY  IP CONSULT TO  !Visualized!Compressibility!Thrombosis! +------------------------+----------+---------------+----------+ !Sapheno Femoral Junction!Yes       !Yes            !None      ! +------------------------+----------+---------------+----------+ !GSV Thigh               !Yes       !Yes            !None      ! +------------------------+----------+---------------+----------+ !Common Femoral          !Yes       !Yes            !None      ! +------------------------+----------+---------------+----------+ !Femoral                 !Yes       !Yes            !None      ! +------------------------+----------+---------------+----------+ !Prox Femoral            !Yes       !Yes            !None      ! +------------------------+----------+---------------+----------+ !Mid Femoral             !Yes       !Yes            !None      ! +------------------------+----------+---------------+----------+ !Dist Femoral            !Yes       !Yes            !None      ! +------------------------+----------+---------------+----------+ !Deep Femoral            !Yes       !Yes            !None      ! +------------------------+----------+---------------+----------+ !Popliteal               !Yes       !Yes            !None      ! +------------------------+----------+---------------+----------+ !GSV Below Knee          !Yes       !Yes            !None      ! +------------------------+----------+---------------+----------+ !Gastroc                 !Yes       !Yes            !None      ! +------------------------+----------+---------------+----------+ !Soleal                  !Yes       !Yes            !None      ! +------------------------+----------+---------------+----------+ !PTV                     !Yes       !Yes            !None      ! +------------------------+----------+---------------+----------+ !Tosha                !Yes       !Yes            !None      ! +------------------------+----------+---------------+----------+ !JEANIE Calf

## 2024-03-09 NOTE — PLAN OF CARE
Problem: Discharge Planning  Goal: Discharge to home or other facility with appropriate resources  3/9/2024 1036 by Leah Matthews RN  Outcome: Completed  3/8/2024 2206 by Negrita Vasquez RN  Outcome: Progressing  Flowsheets (Taken 3/8/2024 2000)  Discharge to home or other facility with appropriate resources:   Identify barriers to discharge with patient and caregiver   Arrange for needed discharge resources and transportation as appropriate     Problem: Pain  Goal: Verbalizes/displays adequate comfort level or baseline comfort level  3/9/2024 1036 by Leah Mattehws RN  Outcome: Completed  Flowsheets (Taken 3/9/2024 0041 by Negrita Vasquez RN)  Verbalizes/displays adequate comfort level or baseline comfort level:   Encourage patient to monitor pain and request assistance   Assess pain using appropriate pain scale  3/8/2024 2206 by Negrita Vasquez RN  Outcome: Progressing     Problem: Safety - Adult  Goal: Free from fall injury  3/9/2024 1036 by Leah Matthews RN  Outcome: Completed  3/8/2024 2206 by Negrita Vasquez RN  Outcome: Progressing     Problem: Respiratory - Adult  Goal: Achieves optimal ventilation and oxygenation  3/9/2024 1036 by Leah Matthews RN  Outcome: Completed  3/8/2024 2206 by Negrita Vasquez RN  Outcome: Progressing  Flowsheets (Taken 3/8/2024 2000)  Achieves optimal ventilation and oxygenation:   Assess for changes in respiratory status   Assess for changes in mentation and behavior     Problem: Cardiovascular - Adult  Goal: Maintains optimal cardiac output and hemodynamic stability  3/9/2024 1036 by Leah Matthews RN  Outcome: Completed  3/8/2024 2206 by Negrita Vasquez RN  Outcome: Progressing  Flowsheets (Taken 3/8/2024 2000)  Maintains optimal cardiac output and hemodynamic stability:   Monitor blood pressure and heart rate   Monitor urine output and notify Licensed Independent Practitioner for values outside of normal range  Goal: Absence  Hematologic - Adult  Goal: Maintains hematologic stability  3/9/2024 1036 by Leah Matthews RN  Outcome: Completed  3/8/2024 2206 by Negrita Vasquez RN  Outcome: Progressing  Flowsheets (Taken 3/8/2024 2000)  Maintains hematologic stability:   Assess for signs and symptoms of bleeding or hemorrhage   Monitor labs for bleeding or clotting disorders     Problem: Chronic Conditions and Co-morbidities  Goal: Patient's chronic conditions and co-morbidity symptoms are monitored and maintained or improved  3/9/2024 1036 by Leah Matthews RN  Outcome: Completed  3/8/2024 2206 by Negrita Vasquez RN  Outcome: Progressing  Flowsheets (Taken 3/8/2024 2000)  Care Plan - Patient's Chronic Conditions and Co-Morbidity Symptoms are Monitored and Maintained or Improved:   Monitor and assess patient's chronic conditions and comorbid symptoms for stability, deterioration, or improvement   Collaborate with multidisciplinary team to address chronic and comorbid conditions and prevent exacerbation or deterioration

## 2024-03-09 NOTE — DISCHARGE INSTRUCTIONS
Please call and make an appointment with your PCP within 1 week  Please call and make an appointment with Cardiology  Please take all your medications as prescribed including any new ones on discharge as follows:    Please take torsemide 100 mg in am and 50 mg in afternoon  Please continue jardiance 10 mg once a day  Please start taking Aldactone 50 mg twice a day  Please start entresto half 24-26 mg twice a day

## 2024-03-09 NOTE — PROGRESS NOTES
Data- discharge order received, pt verbalized agreement to discharge, disposition to previous residence, no needs for HHC/DME.     Action- discharge instructions prepared and given to patient, pt verbalized understanding. Medication information packet given r/t NEW and/or CHANGED prescriptions emphasizing name/purpose/side effects, pt verbalized understanding. Discharge instruction summary: Diet- cardiac, Activity- as tolerated, Primary Care Physician as follows: Mariana Simental -[phone] f/u appointment, immunizations reviewed and updated, prescription medications filled at pharmacy. Inpatient surgical procedure precautions reviewed: CHF Education reviewed. Pt/ Family has had a total of 60 minutes CHF education this admission encounter.     1. WEIGHT: Admit Weight - Scale: 97.6 kg (215 lb 1.6 oz) (03/06/24 1700)        Today  Weight - Scale: 90 kg (198 lb 6.6 oz) (03/09/24 0510)       2. O2 SAT.: SpO2: 96 % (03/09/24 0815)    Response- Pt belongings gathered, IV removed. Disposition is home (no HHC/DME needs), transported with belongings, taken to lobby via w/c w/ spouse, no complications.

## 2024-03-09 NOTE — PROGRESS NOTES
+ bs  Extremities:  No Cyanosis or Clubbing  Extremities: Trace edema LE, +3 lymphedema LUE  Neurological/Psychiatric:  Oriented to time, place, and person  Non-anxious    Pertinent labs, diagnostic, device, and imaging results reviewed as a part of this visit    MEDICATIONS:   Scheduled Meds:   Scheduled Meds:   tamsulosin  0.4 mg Oral Daily    sennosides-docusate sodium  2 tablet Oral Daily    sacubitril-valsartan  0.5 tablet Oral BID    torsemide  100 mg Oral QAM AC    torsemide  50 mg Oral QPM    spironolactone  50 mg Oral BID    insulin lispro  5 Units SubCUTAneous TID WC    insulin glargine  25 Units SubCUTAneous Nightly    oxyCODONE  20 mg Oral BID    guaiFENesin  600 mg Oral BID    insulin regular  10 Units SubCUTAneous Once    insulin lispro  0-16 Units SubCUTAneous TID WC    insulin lispro  0-4 Units SubCUTAneous Nightly    empagliflozin  10 mg Oral Daily    aspirin  81 mg Oral Daily    benztropine  2 mg Oral Daily    brexpiprazole  4 mg Oral Nightly    clopidogrel  75 mg Oral Daily    DULoxetine  60 mg Oral BID    levothyroxine  150 mcg Oral QAM AC    metoprolol tartrate  25 mg Oral BID    ipratropium 0.5 mg-albuterol 2.5 mg  1 Dose Inhalation TID RT     Continuous Infusions:   dextrose       PRN Meds:.acetaminophen, dextrose bolus **OR** dextrose bolus, glucagon (rDNA), dextrose, albuterol, oxyCODONE-acetaminophen, ipratropium 0.5 mg-albuterol 2.5 mg, nitroGLYCERIN  Continuous Infusions:   dextrose         Intake/Output Summary (Last 24 hours) at 3/9/2024 0958  Last data filed at 3/9/2024 0104  Gross per 24 hour   Intake 680 ml   Output 1950 ml   Net -1270 ml       Lab Data:  CBC:   Lab Results   Component Value Date/Time    WBC 9.5 03/09/2024 04:53 AM    HGB 11.6 03/09/2024 04:53 AM     03/09/2024 04:53 AM     BMP:  Lab Results   Component Value Date/Time     03/09/2024 04:53 AM    K 3.9 03/09/2024 04:53 AM    K 4.6 03/13/2023 08:05 AM    CL 94 03/09/2024 04:53 AM    CO2 36 03/09/2024 04:53  discussed. I have discussed the above stated plan with patient and the nurse. The patient verbalized understanding and agreed with the plan.    I appreciate the opportunity of cooperating in the care of this individual.    HERMES MELGAR - CNP, ACNP, AGPCNP 3/9/2024, 9:58 AM  Heart [place] [address]  Ph: [phone]

## 2024-03-10 NOTE — PROGRESS NOTES
Physician Progress Note      PATIENT:               ROBERTH BECKER  CSN #:                  [id number]  :                       1970  ADMIT DATE:       3/6/2024 4:50 PM  DISCH DATE:        3/9/2024 10:44 AM  RESPONDING  PROVIDER #:        KEISHA ANGELO MD          QUERY TEXT:    Pt admitted with acute CHF and has respiratory failure documented. If   possible, please document in progress notes and discharge summary further   specificity regarding the type and acuity of respiratory failure:    The medical record reflects the following:  Risk Factors: chronic respiratory failure, baseline home O2 2.5L    Clinical Indicators: Per 3/6 ED notes- \"She is not hypoxic currently. She is   tachypneic, speaks in short phrases and appears in respiratory distress. Given   her respiratory distress, did initiate on BiPAP which patient tolerated well.   She is hypercapnic with a CO2 of 66.5 but does seem to be compensated. VBG   with hypercapnia, trending towards respiratory acidosis.\"  Per 3/6 HP-  \"VBG showed mild hypercapnia.  She was subsequently placed on   BiPAP support.\"  VBG- pCO2- 66.5    Treatment: VBG, Bipap  Options provided:  -- Acute on chronic respiratory failure with hypercapnia  -- Chronic respiratory failure with hypercapnia  -- Other - I will add my own diagnosis  -- Disagree - Not applicable / Not valid  -- Disagree - Clinically unable to determine / Unknown  -- Refer to Clinical Documentation Reviewer    PROVIDER RESPONSE TEXT:    This patient is in acute on chronic respiratory failure with hypercapnia.    Query created by: Bonny Cardoza on 3/8/2024 11:34 AM      Electronically signed by:  KEISHA ANGELO MD 3/9/2024 6:59 PM

## 2024-03-11 ENCOUNTER — TELEPHONE (OUTPATIENT)
Dept: OTHER | Age: 54
End: 2024-03-11

## 2024-03-11 ENCOUNTER — TELEPHONE (OUTPATIENT)
Dept: INTERNAL MEDICINE CLINIC | Age: 54
End: 2024-03-11

## 2024-03-11 NOTE — TELEPHONE ENCOUNTER
[facility]  HEART FAILURE PROGRAM  TELEPHONE ENCOUNTER FORM    Crow Bonner 1970    Attempted to call patient for HF follow-up. No answer at this time.      Next MD/ Clinic appointment: 3/14 appointment with [facility] moved to 3/27      LEIF MORALEZ, RN 3/11/2024 10:34 AM

## 2024-03-11 NOTE — TELEPHONE ENCOUNTER
Admitted to [facility] 3/6-3/9/24. Does she need a HFU appointment w/me as well?

## 2024-03-11 NOTE — TELEPHONE ENCOUNTER
Richmond Deutsch sent to [facility] Internal Medicine Practice Staff  ECC Message to Provider    Relationship to Patient: Spouse/Partner  Janel Bonner    Additional Information His wife have a heart failure last week and she have an appointment with a heart doctor for next week and just wanted to inform Dr. Mariana Simental    --------------------------------------------------------------------------------------------------------------------------    Call Back Information: OK to leave message on voicemail  Preferred Call Back Number: Phone [phone]

## 2024-03-12 ENCOUNTER — TELEPHONE (OUTPATIENT)
Dept: OTHER | Age: 54
End: 2024-03-12

## 2024-03-13 ENCOUNTER — APPOINTMENT (OUTPATIENT)
Dept: CT IMAGING | Age: 54
DRG: 720 | End: 2024-03-13
Payer: COMMERCIAL

## 2024-03-13 ENCOUNTER — APPOINTMENT (OUTPATIENT)
Dept: GENERAL RADIOLOGY | Age: 54
DRG: 720 | End: 2024-03-13
Payer: COMMERCIAL

## 2024-03-13 ENCOUNTER — HOSPITAL ENCOUNTER (INPATIENT)
Age: 54
LOS: 5 days | Discharge: HOME HEALTH | DRG: 720 | End: 2024-03-18
Attending: STUDENT IN AN ORGANIZED HEALTH CARE EDUCATION/TRAINING PROGRAM | Admitting: INTERNAL MEDICINE
Payer: COMMERCIAL

## 2024-03-13 DIAGNOSIS — J69.0 ASPIRATION PNEUMONIA OF BOTH LOWER LOBES DUE TO GASTRIC SECRETIONS (HCC): ICD-10-CM

## 2024-03-13 DIAGNOSIS — J96.02 ACUTE RESPIRATORY FAILURE WITH HYPOXIA AND HYPERCAPNIA (HCC): Primary | ICD-10-CM

## 2024-03-13 DIAGNOSIS — J96.01 ACUTE RESPIRATORY FAILURE WITH HYPOXIA AND HYPERCAPNIA (HCC): Primary | ICD-10-CM

## 2024-03-13 DIAGNOSIS — R79.89 ELEVATED TROPONIN: ICD-10-CM

## 2024-03-13 DIAGNOSIS — K83.8 BILIARY SLUDGE: ICD-10-CM

## 2024-03-13 DIAGNOSIS — G93.40 ACUTE ENCEPHALOPATHY: ICD-10-CM

## 2024-03-13 DIAGNOSIS — J96.21 ACUTE ON CHRONIC HYPOXIC RESPIRATORY FAILURE (HCC): ICD-10-CM

## 2024-03-13 DIAGNOSIS — R79.89 INCREASED AMMONIA LEVEL: ICD-10-CM

## 2024-03-13 DIAGNOSIS — N17.9 AKI (ACUTE KIDNEY INJURY) (HCC): ICD-10-CM

## 2024-03-13 LAB
ALBUMIN SERPL-MCNC: 4.1 G/DL (ref 3.4–5)
ALBUMIN/GLOB SERPL: 1 {RATIO} (ref 1.1–2.2)
ALP SERPL-CCNC: 126 U/L (ref 40–129)
ALT SERPL-CCNC: 15 U/L (ref 10–40)
AMMONIA PLAS-SCNC: 88 UMOL/L (ref 11–51)
AMPHETAMINES UR QL SCN>1000 NG/ML: ABNORMAL
ANION GAP SERPL CALCULATED.3IONS-SCNC: 14 MMOL/L (ref 3–16)
AST SERPL-CCNC: 17 U/L (ref 15–37)
BACTERIA URNS QL MICRO: ABNORMAL /HPF
BARBITURATES UR QL SCN>200 NG/ML: ABNORMAL
BASE EXCESS BLDA CALC-SCNC: 5.2 MMOL/L (ref -3–3)
BASE EXCESS BLDV CALC-SCNC: 3.9 MMOL/L (ref -3–3)
BASOPHILS # BLD: 0.1 K/UL (ref 0–0.2)
BASOPHILS NFR BLD: 0.8 %
BENZODIAZ UR QL SCN>200 NG/ML: POSITIVE
BILIRUB SERPL-MCNC: <0.2 MG/DL (ref 0–1)
BILIRUB UR QL STRIP.AUTO: NEGATIVE
BUN SERPL-MCNC: 58 MG/DL (ref 7–20)
CALCIUM SERPL-MCNC: 9.1 MG/DL (ref 8.3–10.6)
CANNABINOIDS UR QL SCN>50 NG/ML: ABNORMAL
CHLORIDE SERPL-SCNC: 93 MMOL/L (ref 99–110)
CLARITY UR: CLEAR
CO2 BLDA-SCNC: 75.2 MMOL/L
CO2 BLDV-SCNC: 62 MMOL/L
CO2 SERPL-SCNC: 29 MMOL/L (ref 21–32)
COCAINE UR QL SCN: ABNORMAL
COHGB MFR BLDA: 0 % (ref 0–1.5)
COHGB MFR BLDV: 5.2 % (ref 0–1.5)
COLOR UR: ABNORMAL
CREAT SERPL-MCNC: 2.2 MG/DL (ref 0.6–1.1)
DEPRECATED RDW RBC AUTO: 14.4 % (ref 12.4–15.4)
DO-HGB MFR BLDV: 2 %
DRUG SCREEN COMMENT UR-IMP: ABNORMAL
EOSINOPHIL # BLD: 0.1 K/UL (ref 0–0.6)
EOSINOPHIL NFR BLD: 0.7 %
EPI CELLS #/AREA URNS AUTO: 3 /HPF (ref 0–5)
ETHANOLAMINE SERPL-MCNC: NORMAL MG/DL (ref 0–0.08)
FENTANYL SCREEN, URINE: ABNORMAL
FLUAV RNA RESP QL NAA+PROBE: NOT DETECTED
FLUBV RNA RESP QL NAA+PROBE: NOT DETECTED
GFR SERPLBLD CREATININE-BSD FMLA CKD-EPI: 26 ML/MIN/{1.73_M2}
GLUCOSE BLD-MCNC: 100 MG/DL (ref 70–99)
GLUCOSE BLD-MCNC: 57 MG/DL (ref 70–99)
GLUCOSE BLD-MCNC: 59 MG/DL (ref 70–99)
GLUCOSE SERPL-MCNC: 116 MG/DL (ref 70–99)
GLUCOSE UR STRIP.AUTO-MCNC: 250 MG/DL
HCG SERPL QL: NEGATIVE
HCO3 BLDA-SCNC: 31.9 MMOL/L (ref 21–29)
HCO3 BLDV-SCNC: 26.8 MMOL/L (ref 23–29)
HCT VFR BLD AUTO: 38 % (ref 36–48)
HGB BLD-MCNC: 12.1 G/DL (ref 12–16)
HGB BLDA-MCNC: 13.9 G/DL (ref 12–16)
HGB UR QL STRIP.AUTO: ABNORMAL
HYALINE CASTS #/AREA URNS AUTO: 33 /LPF (ref 0–8)
KETONES UR STRIP.AUTO-MCNC: NEGATIVE MG/DL
LACTATE BLDV-SCNC: 2 MMOL/L (ref 0.4–1.9)
LACTATE BLDV-SCNC: 2 MMOL/L (ref 0.4–1.9)
LEUKOCYTE ESTERASE UR QL STRIP.AUTO: NEGATIVE
LYMPHOCYTES # BLD: 2.5 K/UL (ref 1–5.1)
LYMPHOCYTES NFR BLD: 14.9 %
MAGNESIUM SERPL-MCNC: 2.6 MG/DL (ref 1.8–2.4)
MCH RBC QN AUTO: 27.3 PG (ref 26–34)
MCHC RBC AUTO-ENTMCNC: 31.8 G/DL (ref 31–36)
MCV RBC AUTO: 85.9 FL (ref 80–100)
METHADONE UR QL SCN>300 NG/ML: ABNORMAL
METHGB MFR BLDA: 0.5 %
METHGB MFR BLDV: 0.6 %
MONOCYTES # BLD: 0.8 K/UL (ref 0–1.3)
MONOCYTES NFR BLD: 4.7 %
NEUTROPHILS # BLD: 13.1 K/UL (ref 1.7–7.7)
NEUTROPHILS NFR BLD: 78.9 %
NITRITE UR QL STRIP.AUTO: NEGATIVE
NT-PROBNP SERPL-MCNC: 515 PG/ML (ref 0–124)
O2 CT VFR BLDV CALC: 18 VOL %
O2 THERAPY: ABNORMAL
O2 THERAPY: ABNORMAL
OPIATES UR QL SCN>300 NG/ML: ABNORMAL
OXYCODONE UR QL SCN: POSITIVE
PCO2 BLDA: 54.3 MMHG (ref 35–45)
PCO2 BLDV: 34.2 MMHG (ref 40–50)
PCP UR QL SCN>25 NG/ML: ABNORMAL
PERFORMED ON: ABNORMAL
PH BLDA: 7.38 [PH] (ref 7.35–7.45)
PH BLDV: 7.5 [PH] (ref 7.35–7.45)
PH UR STRIP.AUTO: 5 [PH] (ref 5–8)
PH UR STRIP: 5 [PH]
PLATELET # BLD AUTO: 255 K/UL (ref 135–450)
PMV BLD AUTO: 9.3 FL (ref 5–10.5)
PO2 BLDA: 116 MMHG (ref 75–108)
PO2 BLDV: 165 MMHG (ref 25–40)
POTASSIUM SERPL-SCNC: 4.5 MMOL/L (ref 3.5–5.1)
PROCALCITONIN SERPL IA-MCNC: 0.08 NG/ML (ref 0–0.15)
PROT SERPL-MCNC: 8.2 G/DL (ref 6.4–8.2)
PROT UR STRIP.AUTO-MCNC: ABNORMAL MG/DL
RBC # BLD AUTO: 4.42 M/UL (ref 4–5.2)
RBC CLUMPS #/AREA URNS AUTO: 44 /HPF (ref 0–4)
SAO2 % BLDA: 95.9 %
SAO2 % BLDV: 97 %
SARS-COV-2 RNA RESP QL NAA+PROBE: NOT DETECTED
SODIUM SERPL-SCNC: 136 MMOL/L (ref 136–145)
SP GR UR STRIP.AUTO: 1.01 (ref 1–1.03)
TROPONIN, HIGH SENSITIVITY: 30 NG/L (ref 0–14)
TROPONIN, HIGH SENSITIVITY: 32 NG/L (ref 0–14)
UA COMPLETE W REFLEX CULTURE PNL UR: ABNORMAL
UA DIPSTICK W REFLEX MICRO PNL UR: YES
URATE SERPL-MCNC: 10.1 MG/DL (ref 2.6–6)
URN SPEC COLLECT METH UR: ABNORMAL
UROBILINOGEN UR STRIP-ACNC: 0.2 E.U./DL
WBC # BLD AUTO: 16.7 K/UL (ref 4–11)
WBC #/AREA URNS AUTO: 2 /HPF (ref 0–5)

## 2024-03-13 PROCEDURE — 6360000002 HC RX W HCPCS

## 2024-03-13 PROCEDURE — 84703 CHORIONIC GONADOTROPIN ASSAY: CPT

## 2024-03-13 PROCEDURE — 71045 X-RAY EXAM CHEST 1 VIEW: CPT

## 2024-03-13 PROCEDURE — 84484 ASSAY OF TROPONIN QUANT: CPT

## 2024-03-13 PROCEDURE — 87449 NOS EACH ORGANISM AG IA: CPT

## 2024-03-13 PROCEDURE — 80307 DRUG TEST PRSMV CHEM ANLYZR: CPT

## 2024-03-13 PROCEDURE — 2000000000 HC ICU R&B

## 2024-03-13 PROCEDURE — 84550 ASSAY OF BLOOD/URIC ACID: CPT

## 2024-03-13 PROCEDURE — 0BH17EZ INSERTION OF ENDOTRACHEAL AIRWAY INTO TRACHEA, VIA NATURAL OR ARTIFICIAL OPENING: ICD-10-PCS | Performed by: INTERNAL MEDICINE

## 2024-03-13 PROCEDURE — 80053 COMPREHEN METABOLIC PANEL: CPT

## 2024-03-13 PROCEDURE — 2500000003 HC RX 250 WO HCPCS: Performed by: INTERNAL MEDICINE

## 2024-03-13 PROCEDURE — 94761 N-INVAS EAR/PLS OXIMETRY MLT: CPT

## 2024-03-13 PROCEDURE — 70450 CT HEAD/BRAIN W/O DYE: CPT

## 2024-03-13 PROCEDURE — 83735 ASSAY OF MAGNESIUM: CPT

## 2024-03-13 PROCEDURE — 6360000002 HC RX W HCPCS: Performed by: STUDENT IN AN ORGANIZED HEALTH CARE EDUCATION/TRAINING PROGRAM

## 2024-03-13 PROCEDURE — 2580000003 HC RX 258: Performed by: STUDENT IN AN ORGANIZED HEALTH CARE EDUCATION/TRAINING PROGRAM

## 2024-03-13 PROCEDURE — 94002 VENT MGMT INPAT INIT DAY: CPT

## 2024-03-13 PROCEDURE — 83880 ASSAY OF NATRIURETIC PEPTIDE: CPT

## 2024-03-13 PROCEDURE — 81001 URINALYSIS AUTO W/SCOPE: CPT

## 2024-03-13 PROCEDURE — 2700000000 HC OXYGEN THERAPY PER DAY

## 2024-03-13 PROCEDURE — 2580000003 HC RX 258: Performed by: INTERNAL MEDICINE

## 2024-03-13 PROCEDURE — 6370000000 HC RX 637 (ALT 250 FOR IP): Performed by: INTERNAL MEDICINE

## 2024-03-13 PROCEDURE — 82077 ASSAY SPEC XCP UR&BREATH IA: CPT

## 2024-03-13 PROCEDURE — 2500000003 HC RX 250 WO HCPCS: Performed by: STUDENT IN AN ORGANIZED HEALTH CARE EDUCATION/TRAINING PROGRAM

## 2024-03-13 PROCEDURE — 99285 EMERGENCY DEPT VISIT HI MDM: CPT

## 2024-03-13 PROCEDURE — 02HV33Z INSERTION OF INFUSION DEVICE INTO SUPERIOR VENA CAVA, PERCUTANEOUS APPROACH: ICD-10-PCS | Performed by: INTERNAL MEDICINE

## 2024-03-13 PROCEDURE — 6360000002 HC RX W HCPCS: Performed by: INTERNAL MEDICINE

## 2024-03-13 PROCEDURE — 5A1945Z RESPIRATORY VENTILATION, 24-96 CONSECUTIVE HOURS: ICD-10-PCS | Performed by: INTERNAL MEDICINE

## 2024-03-13 PROCEDURE — 36415 COLL VENOUS BLD VENIPUNCTURE: CPT

## 2024-03-13 PROCEDURE — 82803 BLOOD GASES ANY COMBINATION: CPT

## 2024-03-13 PROCEDURE — 85025 COMPLETE CBC W/AUTO DIFF WBC: CPT

## 2024-03-13 PROCEDURE — 6360000002 HC RX W HCPCS: Performed by: PHYSICIAN ASSISTANT

## 2024-03-13 PROCEDURE — 36556 INSERT NON-TUNNEL CV CATH: CPT

## 2024-03-13 PROCEDURE — 71250 CT THORAX DX C-: CPT

## 2024-03-13 PROCEDURE — 83605 ASSAY OF LACTIC ACID: CPT

## 2024-03-13 PROCEDURE — 94640 AIRWAY INHALATION TREATMENT: CPT

## 2024-03-13 PROCEDURE — 87040 BLOOD CULTURE FOR BACTERIA: CPT

## 2024-03-13 PROCEDURE — 82140 ASSAY OF AMMONIA: CPT

## 2024-03-13 PROCEDURE — 93005 ELECTROCARDIOGRAM TRACING: CPT | Performed by: STUDENT IN AN ORGANIZED HEALTH CARE EDUCATION/TRAINING PROGRAM

## 2024-03-13 PROCEDURE — 51702 INSERT TEMP BLADDER CATH: CPT

## 2024-03-13 PROCEDURE — 3E033XZ INTRODUCTION OF VASOPRESSOR INTO PERIPHERAL VEIN, PERCUTANEOUS APPROACH: ICD-10-PCS | Performed by: INTERNAL MEDICINE

## 2024-03-13 PROCEDURE — C9113 INJ PANTOPRAZOLE SODIUM, VIA: HCPCS | Performed by: INTERNAL MEDICINE

## 2024-03-13 PROCEDURE — 84145 PROCALCITONIN (PCT): CPT

## 2024-03-13 PROCEDURE — 87636 SARSCOV2 & INF A&B AMP PRB: CPT

## 2024-03-13 PROCEDURE — 2580000003 HC RX 258: Performed by: PHYSICIAN ASSISTANT

## 2024-03-13 PROCEDURE — 99291 CRITICAL CARE FIRST HOUR: CPT | Performed by: INTERNAL MEDICINE

## 2024-03-13 RX ORDER — SODIUM CHLORIDE 9 MG/ML
25 INJECTION, SOLUTION INTRAVENOUS PRN
Status: DISCONTINUED | OUTPATIENT
Start: 2024-03-13 | End: 2024-03-18 | Stop reason: HOSPADM

## 2024-03-13 RX ORDER — 0.9 % SODIUM CHLORIDE 0.9 %
1000 INTRAVENOUS SOLUTION INTRAVENOUS ONCE
Status: COMPLETED | OUTPATIENT
Start: 2024-03-13 | End: 2024-03-13

## 2024-03-13 RX ORDER — LIDOCAINE HYDROCHLORIDE 10 MG/ML
5 INJECTION, SOLUTION EPIDURAL; INFILTRATION; INTRACAUDAL; PERINEURAL ONCE
Status: DISCONTINUED | OUTPATIENT
Start: 2024-03-13 | End: 2024-03-14

## 2024-03-13 RX ORDER — SODIUM CHLORIDE 0.9 % (FLUSH) 0.9 %
5-40 SYRINGE (ML) INJECTION ONCE
Start: 2024-03-14 | End: 2024-03-14

## 2024-03-13 RX ORDER — ETOMIDATE 2 MG/ML
20 INJECTION INTRAVENOUS ONCE
Status: COMPLETED | OUTPATIENT
Start: 2024-03-13 | End: 2024-03-13

## 2024-03-13 RX ORDER — SODIUM CHLORIDE 9 MG/ML
INJECTION, SOLUTION INTRAVENOUS CONTINUOUS
Status: DISCONTINUED | OUTPATIENT
Start: 2024-03-13 | End: 2024-03-14

## 2024-03-13 RX ORDER — ALBUTEROL SULFATE 2.5 MG/3ML
2.5 SOLUTION RESPIRATORY (INHALATION) ONCE
Status: COMPLETED | OUTPATIENT
Start: 2024-03-13 | End: 2024-03-13

## 2024-03-13 RX ORDER — SODIUM CHLORIDE 0.9 % (FLUSH) 0.9 %
5-40 SYRINGE (ML) INJECTION PRN
Status: DISCONTINUED | OUTPATIENT
Start: 2024-03-13 | End: 2024-03-18 | Stop reason: HOSPADM

## 2024-03-13 RX ORDER — HEPARIN SODIUM 5000 [USP'U]/ML
5000 INJECTION, SOLUTION INTRAVENOUS; SUBCUTANEOUS EVERY 8 HOURS SCHEDULED
Status: DISCONTINUED | OUTPATIENT
Start: 2024-03-13 | End: 2024-03-18 | Stop reason: HOSPADM

## 2024-03-13 RX ORDER — ROSUVASTATIN CALCIUM 20 MG/1
20 TABLET, COATED ORAL NIGHTLY
Status: DISCONTINUED | OUTPATIENT
Start: 2024-03-13 | End: 2024-03-18 | Stop reason: HOSPADM

## 2024-03-13 RX ORDER — IPRATROPIUM BROMIDE AND ALBUTEROL SULFATE 2.5; .5 MG/3ML; MG/3ML
1 SOLUTION RESPIRATORY (INHALATION)
Status: DISCONTINUED | OUTPATIENT
Start: 2024-03-13 | End: 2024-03-14

## 2024-03-13 RX ORDER — INSULIN LISPRO 100 [IU]/ML
0-4 INJECTION, SOLUTION INTRAVENOUS; SUBCUTANEOUS NIGHTLY
Status: DISCONTINUED | OUTPATIENT
Start: 2024-03-13 | End: 2024-03-14

## 2024-03-13 RX ORDER — ONDANSETRON 2 MG/ML
INJECTION INTRAMUSCULAR; INTRAVENOUS
Status: COMPLETED
Start: 2024-03-13 | End: 2024-03-13

## 2024-03-13 RX ORDER — GLUCAGON 1 MG/ML
1 KIT INJECTION PRN
Status: DISCONTINUED | OUTPATIENT
Start: 2024-03-13 | End: 2024-03-18 | Stop reason: HOSPADM

## 2024-03-13 RX ORDER — ACETAMINOPHEN 325 MG/1
650 TABLET ORAL EVERY 6 HOURS PRN
Status: DISCONTINUED | OUTPATIENT
Start: 2024-03-13 | End: 2024-03-18 | Stop reason: HOSPADM

## 2024-03-13 RX ORDER — SODIUM CHLORIDE 0.9 % (FLUSH) 0.9 %
5-40 SYRINGE (ML) INJECTION EVERY 12 HOURS SCHEDULED
Status: DISCONTINUED | OUTPATIENT
Start: 2024-03-13 | End: 2024-03-18 | Stop reason: HOSPADM

## 2024-03-13 RX ORDER — SODIUM CHLORIDE 9 MG/ML
INJECTION, SOLUTION INTRAVENOUS PRN
Status: DISCONTINUED | OUTPATIENT
Start: 2024-03-13 | End: 2024-03-18 | Stop reason: HOSPADM

## 2024-03-13 RX ORDER — FENTANYL CITRATE-0.9 % NACL/PF 10 MCG/ML
25-200 PLASTIC BAG, INJECTION (ML) INTRAVENOUS CONTINUOUS
Status: DISCONTINUED | OUTPATIENT
Start: 2024-03-13 | End: 2024-03-13 | Stop reason: SDUPTHER

## 2024-03-13 RX ORDER — ROCURONIUM BROMIDE 10 MG/ML
100 INJECTION, SOLUTION INTRAVENOUS ONCE
Status: COMPLETED | OUTPATIENT
Start: 2024-03-13 | End: 2024-03-13

## 2024-03-13 RX ORDER — DEXTROSE MONOHYDRATE 100 MG/ML
INJECTION, SOLUTION INTRAVENOUS CONTINUOUS PRN
Status: DISCONTINUED | OUTPATIENT
Start: 2024-03-13 | End: 2024-03-18 | Stop reason: HOSPADM

## 2024-03-13 RX ORDER — INSULIN LISPRO 100 [IU]/ML
0-8 INJECTION, SOLUTION INTRAVENOUS; SUBCUTANEOUS
Status: DISCONTINUED | OUTPATIENT
Start: 2024-03-13 | End: 2024-03-14

## 2024-03-13 RX ORDER — ENOXAPARIN SODIUM 100 MG/ML
40 INJECTION SUBCUTANEOUS DAILY
Status: DISCONTINUED | OUTPATIENT
Start: 2024-03-13 | End: 2024-03-13 | Stop reason: ALTCHOICE

## 2024-03-13 RX ORDER — PANTOPRAZOLE SODIUM 40 MG/10ML
40 INJECTION, POWDER, LYOPHILIZED, FOR SOLUTION INTRAVENOUS DAILY
Status: DISCONTINUED | OUTPATIENT
Start: 2024-03-13 | End: 2024-03-18 | Stop reason: HOSPADM

## 2024-03-13 RX ORDER — PROPOFOL 10 MG/ML
5-50 INJECTION, EMULSION INTRAVENOUS CONTINUOUS
Status: DISCONTINUED | OUTPATIENT
Start: 2024-03-13 | End: 2024-03-13 | Stop reason: SDUPTHER

## 2024-03-13 RX ORDER — POLYETHYLENE GLYCOL 3350 17 G/17G
17 POWDER, FOR SOLUTION ORAL DAILY PRN
Status: DISCONTINUED | OUTPATIENT
Start: 2024-03-13 | End: 2024-03-18 | Stop reason: HOSPADM

## 2024-03-13 RX ORDER — PANTOPRAZOLE SODIUM 40 MG/1
40 TABLET, DELAYED RELEASE ORAL
Status: DISCONTINUED | OUTPATIENT
Start: 2024-03-14 | End: 2024-03-13 | Stop reason: ALTCHOICE

## 2024-03-13 RX ORDER — SODIUM CHLORIDE, SODIUM LACTATE, POTASSIUM CHLORIDE, CALCIUM CHLORIDE 600; 310; 30; 20 MG/100ML; MG/100ML; MG/100ML; MG/100ML
INJECTION, SOLUTION INTRAVENOUS CONTINUOUS
Status: DISCONTINUED | OUTPATIENT
Start: 2024-03-13 | End: 2024-03-14

## 2024-03-13 RX ORDER — TAMSULOSIN HYDROCHLORIDE 0.4 MG/1
0.4 CAPSULE ORAL NIGHTLY
Status: DISCONTINUED | OUTPATIENT
Start: 2024-03-13 | End: 2024-03-18 | Stop reason: HOSPADM

## 2024-03-13 RX ORDER — CLOPIDOGREL BISULFATE 75 MG/1
75 TABLET ORAL DAILY
Status: DISCONTINUED | OUTPATIENT
Start: 2024-03-13 | End: 2024-03-18 | Stop reason: HOSPADM

## 2024-03-13 RX ORDER — PROPOFOL 10 MG/ML
5-50 INJECTION, EMULSION INTRAVENOUS CONTINUOUS
Status: DISCONTINUED | OUTPATIENT
Start: 2024-03-13 | End: 2024-03-15

## 2024-03-13 RX ORDER — ALBUTEROL SULFATE 90 UG/1
2 AEROSOL, METERED RESPIRATORY (INHALATION)
Status: DISCONTINUED | OUTPATIENT
Start: 2024-03-13 | End: 2024-03-14

## 2024-03-13 RX ORDER — NOREPINEPHRINE BITARTRATE 0.06 MG/ML
1-100 INJECTION, SOLUTION INTRAVENOUS CONTINUOUS
Status: DISCONTINUED | OUTPATIENT
Start: 2024-03-13 | End: 2024-03-15

## 2024-03-13 RX ORDER — LEVOTHYROXINE SODIUM 0.15 MG/1
150 TABLET ORAL
Status: DISCONTINUED | OUTPATIENT
Start: 2024-03-14 | End: 2024-03-18 | Stop reason: HOSPADM

## 2024-03-13 RX ORDER — ACETAMINOPHEN 650 MG/1
650 SUPPOSITORY RECTAL EVERY 6 HOURS PRN
Status: DISCONTINUED | OUTPATIENT
Start: 2024-03-13 | End: 2024-03-18 | Stop reason: HOSPADM

## 2024-03-13 RX ORDER — FUROSEMIDE 10 MG/ML
120 INJECTION INTRAMUSCULAR; INTRAVENOUS ONCE
Start: 2024-03-14 | End: 2024-03-14

## 2024-03-13 RX ORDER — INSULIN LISPRO 100 [IU]/ML
0-16 INJECTION, SOLUTION INTRAVENOUS; SUBCUTANEOUS
Status: DISCONTINUED | OUTPATIENT
Start: 2024-03-13 | End: 2024-03-13 | Stop reason: ALTCHOICE

## 2024-03-13 RX ORDER — ASPIRIN 81 MG/1
81 TABLET ORAL DAILY
Status: DISCONTINUED | OUTPATIENT
Start: 2024-03-13 | End: 2024-03-14 | Stop reason: SDUPTHER

## 2024-03-13 RX ORDER — INSULIN LISPRO 100 [IU]/ML
0-4 INJECTION, SOLUTION INTRAVENOUS; SUBCUTANEOUS NIGHTLY
Status: DISCONTINUED | OUTPATIENT
Start: 2024-03-13 | End: 2024-03-13 | Stop reason: ALTCHOICE

## 2024-03-13 RX ORDER — ONDANSETRON 2 MG/ML
4 INJECTION INTRAMUSCULAR; INTRAVENOUS ONCE
Status: COMPLETED | OUTPATIENT
Start: 2024-03-13 | End: 2024-03-13

## 2024-03-13 RX ADMIN — PIPERACILLIN AND TAZOBACTAM 3375 MG: 3; .375 INJECTION, POWDER, LYOPHILIZED, FOR SOLUTION INTRAVENOUS at 17:32

## 2024-03-13 RX ADMIN — ALBUTEROL SULFATE 2.5 MG: 2.5 SOLUTION RESPIRATORY (INHALATION) at 17:23

## 2024-03-13 RX ADMIN — Medication 10 ML: at 20:23

## 2024-03-13 RX ADMIN — ROCURONIUM BROMIDE 100 MG: 10 INJECTION, SOLUTION INTRAVENOUS at 16:15

## 2024-03-13 RX ADMIN — PROPOFOL 20 MCG/KG/MIN: 10 INJECTION, EMULSION INTRAVENOUS at 23:37

## 2024-03-13 RX ADMIN — DEXTROSE MONOHYDRATE 125 ML: 100 INJECTION, SOLUTION INTRAVENOUS at 23:14

## 2024-03-13 RX ADMIN — IPRATROPIUM BROMIDE AND ALBUTEROL SULFATE 1 DOSE: .5; 3 SOLUTION RESPIRATORY (INHALATION) at 20:33

## 2024-03-13 RX ADMIN — ETOMIDATE 20 MG: 20 INJECTION, SOLUTION INTRAVENOUS at 16:13

## 2024-03-13 RX ADMIN — SODIUM CHLORIDE 1000 ML: 9 INJECTION, SOLUTION INTRAVENOUS at 20:22

## 2024-03-13 RX ADMIN — SODIUM CHLORIDE: 9 INJECTION, SOLUTION INTRAVENOUS at 21:28

## 2024-03-13 RX ADMIN — SODIUM CHLORIDE 1000 ML: 9 INJECTION, SOLUTION INTRAVENOUS at 17:39

## 2024-03-13 RX ADMIN — PANTOPRAZOLE SODIUM 40 MG: 40 INJECTION, POWDER, FOR SOLUTION INTRAVENOUS at 23:04

## 2024-03-13 RX ADMIN — ONDANSETRON 4 MG: 2 INJECTION INTRAMUSCULAR; INTRAVENOUS at 16:18

## 2024-03-13 RX ADMIN — HEPARIN SODIUM 5000 UNITS: 5000 INJECTION INTRAVENOUS; SUBCUTANEOUS at 23:26

## 2024-03-13 RX ADMIN — PROPOFOL 10 MCG/KG/MIN: 10 INJECTION, EMULSION INTRAVENOUS at 16:23

## 2024-03-13 RX ADMIN — Medication 50 MCG/HR: at 18:42

## 2024-03-13 RX ADMIN — SODIUM CHLORIDE, PRESERVATIVE FREE 10 ML: 5 INJECTION INTRAVENOUS at 20:22

## 2024-03-13 RX ADMIN — ACETAMINOPHEN 650 MG: 650 SUPPOSITORY RECTAL at 20:39

## 2024-03-13 RX ADMIN — Medication 5 MCG/MIN: at 22:15

## 2024-03-13 ASSESSMENT — PAIN SCALES - GENERAL
PAINLEVEL_OUTOF10: 0

## 2024-03-13 ASSESSMENT — PULMONARY FUNCTION TESTS
PIF_VALUE: 30
PIF_VALUE: 34

## 2024-03-13 ASSESSMENT — PAIN - FUNCTIONAL ASSESSMENT: PAIN_FUNCTIONAL_ASSESSMENT: 0-10

## 2024-03-13 NOTE — ED NOTES
Per  pt went to the eye Dr she did not wear her oxygen  Pt per  was having chills last night, the chills and shaking increased during eye Dr appt to the point in which the eye Dr stated the exam couldn't be completed.  By the time they arrived home pt was unresponsive and sats were in the 60% range.

## 2024-03-13 NOTE — H&P
HOSPITALISTS HISTORY AND PHYSICAL    3/13/2024 6:30 PM    Patient Information:  CROW BONNER is a 53 y.o. female [id number]  PCP:  Mariana Simental DO (Tel: [phone] )    Chief complaint:    Chief Complaint   Patient presents with    Respiratory Distress     Pt arrived via [facility] from her home, was found in resp distress in passenger seat of car, had just got home from eye Dr appt.  EMS concerned for allergic reaction, epi given.  Pt sat's 87% on RA, pt arrives to ER unresponsive on non rebreather.      History of Present Illness:  Crow Bonner is a 53 y.o. female who who was admitted to the hospital earlier last week and discharged for acute diastolic heart failure exacerbation .  Today patient went to eye doctor did not wear oxygen supposed to be on 2 L at home had her eyes dilated but was lethargic thus sent to the emergency department per patient  who was in the room patient did not have any fevers chills sweats cough at home.  Patient was found to be septic with pneumonia and was intubated in the ED      REVIEW OF SYSTEMS:   Intubatd andsedated    Past Medical History:   has a past medical history of Acute CVA (cerebrovascular accident) ([facility]), Anxiety, Anxiety and depression, Arthritis, Asthma, CAD (coronary artery disease), Cancer ([facility]), Cerebral artery occlusion with cerebral infarction ([facility]), CHF (congestive heart failure) ([facility]), Chronic kidney disease, Chronic pain, Constipation, COPD (chronic obstructive pulmonary disease) ([facility]), Depression, Diabetes mellitus ([facility]), Diabetic polyneuropathy associated with type 2 diabetes mellitus ([facility]), Dysthymia, ESBL (extended spectrum beta-lactamase) producing bacteria infection, Fibromyalgia, Gastric ulcer, unspecified as acute or chronic, without mention of hemorrhage, perforation, or obstruction, GERD (gastroesophageal reflux disease), Gout, HIGH

## 2024-03-13 NOTE — ED NOTES
Multiple attempts to obtain blood cultures, were unsuccessful, Dr Thomas aware, order placed for midline placement.

## 2024-03-13 NOTE — ED PROVIDER NOTES
In addition to the advanced practice provider, I personally saw Crow Bonner and performed a substantive portion of the visit including all aspects of the medical decision making.    Medical Decision Making  53-year-old female brought in for unresponsiveness, emesis.  Patient was in her car with her .  Apparently she had a dilated eye exam/appt today.  provided additional reports that the patient had been without her oxygen all day because she felt like she did not need it to go to the eye doctor.  She had been fatigued since the am. After receiving dilating drops the eye doctor decided not to proceed with the exam because the patient was so unresponsive.  Family drove the patient home, had difficulty getting her out of the car, then called EMS.   also reports chills since last night.  Patient provides no history secondary to altered mental status.      On exam she is minimally responsive, GCS of 6.  Pupils are 6 mm bilaterally and unreactive.  She does not respond to verbal stimuli.  She withdraws right upper extremity from pain.  Right upper extremity IO is in place at the shoulder.  She has massive edema to the left upper extremity, history of lymphedema and left mastectomy.  Lungs with some coarse expiratory breath sounds, no wheezing.    EKG  The Ekg interpreted by me in the absence of a cardiologist shows.  Normal sinus rhythm with a ventricular rate of 86.  Axis normal.  QTc appropriate.  No specific ST or T wave abnormality.      SEP-1  Is this patient to be included in the SEP-1 Core Measure due to severe sepsis or septic shock?   Yes   SEP-1 CORE MEASURE DATA      Sepsis Criteria   Severe Sepsis Criteria   Septic Shock Criteria     Must be confirmed or suspected to move forward with diagnosis of sepsis.    Must meet 2:    [] Temperature > 100.9 F (38.3 C)        or < 96.8 F (36 C)  [] HR > 90  [] RR > 20  [] WBC > 12 or < 4 or 10% bands      AND:      [] Infection Confirmed or       
more Elements     ED Triage Vitals   BP Temp Temp src Pulse Respirations SpO2 Height Weight - Scale   03/13/24 1616 -- -- 03/13/24 1605 03/13/24 1605 03/13/24 1605 03/13/24 1605 03/13/24 1605   (!) 171/87   87 24 100 % 1.626 m (5' 4\") 90.1 kg (198 lb 9.6 oz)       Physical Exam  Vitals and nursing note reviewed.   Constitutional:       General: She is sleeping. She is in acute distress.      Appearance: She is obese. She is ill-appearing and toxic-appearing.      Interventions: Face mask in place.   HENT:      Head: Normocephalic and atraumatic.      Right Ear: External ear normal.      Left Ear: External ear normal.      Nose: Nose normal.      Mouth/Throat:      Mouth: Mucous membranes are moist.      Pharynx: Oropharynx is clear.   Eyes:      Extraocular Movements: Extraocular movements intact.      Conjunctiva/sclera: Conjunctivae normal.      Comments: Bilateral pupils dilated, nonreactive to light.  Roving extraocular movements, does not focus   Cardiovascular:      Rate and Rhythm: Normal rate and regular rhythm.      Pulses: Normal pulses.      Heart sounds: Normal heart sounds.   Pulmonary:      Effort: Pulmonary effort is normal. No respiratory distress.      Breath sounds: Normal breath sounds.   Abdominal:      General: Abdomen is flat. Bowel sounds are normal. There is no distension.      Palpations: Abdomen is soft.      Tenderness: There is no abdominal tenderness. There is no guarding or rebound.   Musculoskeletal:         General: Swelling (LUE) present. Normal range of motion.      Cervical back: Normal range of motion and neck supple.      Right lower leg: Edema present.      Left lower leg: Edema present.      Comments: Right humerus with IO   Skin:     General: Skin is warm and dry.      Capillary Refill: Capillary refill takes less than 2 seconds.   Neurological:      Mental Status: She is unresponsive.      GCS: GCS eye subscore is 3. GCS verbal subscore is 2. GCS motor subscore is 2.

## 2024-03-14 ENCOUNTER — HOSPITAL ENCOUNTER (OUTPATIENT)
Dept: ONCOLOGY | Age: 54
Setting detail: INFUSION SERIES
Discharge: HOME | End: 2024-03-14

## 2024-03-14 PROBLEM — A41.9 SEVERE SEPSIS (HCC): Status: ACTIVE | Noted: 2024-03-14

## 2024-03-14 PROBLEM — I50.32 CHRONIC DIASTOLIC HEART FAILURE (HCC): Status: ACTIVE | Noted: 2018-01-11

## 2024-03-14 PROBLEM — I50.33 ACUTE ON CHRONIC HEART FAILURE WITH PRESERVED EJECTION FRACTION (HCC): Status: RESOLVED | Noted: 2024-03-06 | Resolved: 2024-03-14

## 2024-03-14 PROBLEM — R40.0 SOMNOLENCE: Status: ACTIVE | Noted: 2024-03-14

## 2024-03-14 PROBLEM — R65.20 SEVERE SEPSIS (HCC): Status: ACTIVE | Noted: 2024-03-14

## 2024-03-14 LAB
AMMONIA PLAS-SCNC: 18 UMOL/L (ref 11–51)
ANION GAP SERPL CALCULATED.3IONS-SCNC: 11 MMOL/L (ref 3–16)
ANISOCYTOSIS BLD QL SMEAR: ABNORMAL
BASE EXCESS BLDA CALC-SCNC: 8 MMOL/L (ref -3–3)
BASOPHILS # BLD: 0 K/UL (ref 0–0.2)
BASOPHILS NFR BLD: 0 %
BUN SERPL-MCNC: 47 MG/DL (ref 7–20)
CALCIUM SERPL-MCNC: 8.4 MG/DL (ref 8.3–10.6)
CHLORIDE SERPL-SCNC: 96 MMOL/L (ref 99–110)
CO2 BLDA-SCNC: 34 MMOL/L
CO2 SERPL-SCNC: 30 MMOL/L (ref 21–32)
CREAT SERPL-MCNC: 1.9 MG/DL (ref 0.6–1.1)
DEPRECATED RDW RBC AUTO: 14.6 % (ref 12.4–15.4)
EKG ATRIAL RATE: 86 BPM
EKG DIAGNOSIS: NORMAL
EKG P AXIS: 37 DEGREES
EKG P-R INTERVAL: 142 MS
EKG Q-T INTERVAL: 342 MS
EKG QRS DURATION: 80 MS
EKG QTC CALCULATION (BAZETT): 409 MS
EKG R AXIS: 43 DEGREES
EKG T AXIS: 43 DEGREES
EKG VENTRICULAR RATE: 86 BPM
EOSINOPHIL # BLD: 0 K/UL (ref 0–0.6)
EOSINOPHIL NFR BLD: 0 %
FOLATE SERPL-MCNC: >20 NG/ML (ref 4.78–24.2)
GFR SERPLBLD CREATININE-BSD FMLA CKD-EPI: 31 ML/MIN/{1.73_M2}
GLUCOSE BLD-MCNC: 121 MG/DL (ref 70–99)
GLUCOSE BLD-MCNC: 124 MG/DL (ref 70–99)
GLUCOSE BLD-MCNC: 144 MG/DL (ref 70–99)
GLUCOSE BLD-MCNC: 154 MG/DL (ref 70–99)
GLUCOSE BLD-MCNC: 188 MG/DL (ref 70–99)
GLUCOSE BLD-MCNC: 91 MG/DL (ref 70–99)
GLUCOSE BLD-MCNC: 92 MG/DL (ref 70–99)
GLUCOSE SERPL-MCNC: 119 MG/DL (ref 70–99)
HCO3 BLDA-SCNC: 32.1 MMOL/L (ref 21–29)
HCT VFR BLD AUTO: 33.7 % (ref 36–48)
HGB BLD-MCNC: 11.1 G/DL (ref 12–16)
LACTATE BLDV-SCNC: 0.6 MMOL/L (ref 0.4–2)
LYMPHOCYTES # BLD: 2.3 K/UL (ref 1–5.1)
LYMPHOCYTES NFR BLD: 10 %
MAGNESIUM SERPL-MCNC: 2.3 MG/DL (ref 1.8–2.4)
MCH RBC QN AUTO: 28.1 PG (ref 26–34)
MCHC RBC AUTO-ENTMCNC: 32.9 G/DL (ref 31–36)
MCV RBC AUTO: 85.3 FL (ref 80–100)
MICROCYTES BLD QL SMEAR: ABNORMAL
MONOCYTES # BLD: 0.9 K/UL (ref 0–1.3)
MONOCYTES NFR BLD: 4 %
NEUTROPHILS # BLD: 20 K/UL (ref 1.7–7.7)
NEUTROPHILS NFR BLD: 86 %
PCO2 BLDA: 48 MM HG (ref 35–45)
PERFORMED ON: ABNORMAL
PERFORMED ON: NORMAL
PERFORMED ON: NORMAL
PH BLDA: 7.43 [PH] (ref 7.35–7.45)
PLATELET # BLD AUTO: 213 K/UL (ref 135–450)
PLATELET BLD QL SMEAR: ADEQUATE
PMV BLD AUTO: 9 FL (ref 5–10.5)
PO2 BLDA: 85.6 MM HG (ref 75–108)
POC SAMPLE TYPE: ABNORMAL
POLYCHROMASIA BLD QL SMEAR: ABNORMAL
POTASSIUM SERPL-SCNC: 3.5 MMOL/L (ref 3.5–5.1)
RBC # BLD AUTO: 3.95 M/UL (ref 4–5.2)
REASON FOR REJECTION: NORMAL
REJECTED TEST: NORMAL
REPORT: NORMAL
RESP PATH DNA+RNA PNL NPH NAA+NON-PROBE: NORMAL
SAO2 % BLDA: 97 % (ref 93–100)
SLIDE REVIEW: ABNORMAL
SODIUM SERPL-SCNC: 137 MMOL/L (ref 136–145)
VIT B12 SERPL-MCNC: 773 PG/ML (ref 211–911)
WBC # BLD AUTO: 23.3 K/UL (ref 4–11)

## 2024-03-14 PROCEDURE — 87641 MR-STAPH DNA AMP PROBE: CPT

## 2024-03-14 PROCEDURE — 2000000000 HC ICU R&B

## 2024-03-14 PROCEDURE — 94761 N-INVAS EAR/PLS OXIMETRY MLT: CPT

## 2024-03-14 PROCEDURE — 0BJ08ZZ INSPECTION OF TRACHEOBRONCHIAL TREE, VIA NATURAL OR ARTIFICIAL OPENING ENDOSCOPIC: ICD-10-PCS | Performed by: INTERNAL MEDICINE

## 2024-03-14 PROCEDURE — 82947 ASSAY GLUCOSE BLOOD QUANT: CPT

## 2024-03-14 PROCEDURE — 6360000002 HC RX W HCPCS: Performed by: STUDENT IN AN ORGANIZED HEALTH CARE EDUCATION/TRAINING PROGRAM

## 2024-03-14 PROCEDURE — 6370000000 HC RX 637 (ALT 250 FOR IP): Performed by: STUDENT IN AN ORGANIZED HEALTH CARE EDUCATION/TRAINING PROGRAM

## 2024-03-14 PROCEDURE — 83735 ASSAY OF MAGNESIUM: CPT

## 2024-03-14 PROCEDURE — 82607 VITAMIN B-12: CPT

## 2024-03-14 PROCEDURE — 82746 ASSAY OF FOLIC ACID SERUM: CPT

## 2024-03-14 PROCEDURE — 31622 DX BRONCHOSCOPE/WASH: CPT | Performed by: INTERNAL MEDICINE

## 2024-03-14 PROCEDURE — 6370000000 HC RX 637 (ALT 250 FOR IP): Performed by: INTERNAL MEDICINE

## 2024-03-14 PROCEDURE — 82140 ASSAY OF AMMONIA: CPT

## 2024-03-14 PROCEDURE — 94003 VENT MGMT INPAT SUBQ DAY: CPT

## 2024-03-14 PROCEDURE — C9113 INJ PANTOPRAZOLE SODIUM, VIA: HCPCS | Performed by: INTERNAL MEDICINE

## 2024-03-14 PROCEDURE — 83036 HEMOGLOBIN GLYCOSYLATED A1C: CPT

## 2024-03-14 PROCEDURE — 93010 ELECTROCARDIOGRAM REPORT: CPT | Performed by: INTERNAL MEDICINE

## 2024-03-14 PROCEDURE — 94640 AIRWAY INHALATION TREATMENT: CPT

## 2024-03-14 PROCEDURE — 2700000000 HC OXYGEN THERAPY PER DAY

## 2024-03-14 PROCEDURE — 87070 CULTURE OTHR SPECIMN AEROBIC: CPT

## 2024-03-14 PROCEDURE — 2580000003 HC RX 258: Performed by: STUDENT IN AN ORGANIZED HEALTH CARE EDUCATION/TRAINING PROGRAM

## 2024-03-14 PROCEDURE — 82803 BLOOD GASES ANY COMBINATION: CPT

## 2024-03-14 PROCEDURE — 6360000002 HC RX W HCPCS: Performed by: INTERNAL MEDICINE

## 2024-03-14 PROCEDURE — 0202U NFCT DS 22 TRGT SARS-COV-2: CPT

## 2024-03-14 PROCEDURE — 80048 BASIC METABOLIC PNL TOTAL CA: CPT

## 2024-03-14 PROCEDURE — 87205 SMEAR GRAM STAIN: CPT

## 2024-03-14 PROCEDURE — 2580000003 HC RX 258: Performed by: INTERNAL MEDICINE

## 2024-03-14 PROCEDURE — 83605 ASSAY OF LACTIC ACID: CPT

## 2024-03-14 PROCEDURE — 85025 COMPLETE CBC W/AUTO DIFF WBC: CPT

## 2024-03-14 PROCEDURE — 99291 CRITICAL CARE FIRST HOUR: CPT | Performed by: INTERNAL MEDICINE

## 2024-03-14 RX ORDER — INSULIN LISPRO 100 [IU]/ML
0-8 INJECTION, SOLUTION INTRAVENOUS; SUBCUTANEOUS EVERY 4 HOURS
Status: DISCONTINUED | OUTPATIENT
Start: 2024-03-14 | End: 2024-03-17

## 2024-03-14 RX ORDER — ASPIRIN 81 MG/1
81 TABLET, CHEWABLE ORAL DAILY
Status: DISCONTINUED | OUTPATIENT
Start: 2024-03-15 | End: 2024-03-18 | Stop reason: HOSPADM

## 2024-03-14 RX ORDER — SULFASALAZINE 500 MG/1
500 TABLET ORAL 2 TIMES DAILY
Status: DISCONTINUED | OUTPATIENT
Start: 2024-03-14 | End: 2024-03-18 | Stop reason: HOSPADM

## 2024-03-14 RX ORDER — DULOXETIN HYDROCHLORIDE 60 MG/1
60 CAPSULE, DELAYED RELEASE ORAL 2 TIMES DAILY
Status: CANCELLED | OUTPATIENT
Start: 2024-03-14

## 2024-03-14 RX ORDER — RANOLAZINE 500 MG/1
500 TABLET, EXTENDED RELEASE ORAL 2 TIMES DAILY
Status: DISCONTINUED | OUTPATIENT
Start: 2024-03-14 | End: 2024-03-18 | Stop reason: HOSPADM

## 2024-03-14 RX ORDER — TAMSULOSIN HYDROCHLORIDE 0.4 MG/1
0.4 CAPSULE ORAL NIGHTLY
Status: CANCELLED | OUTPATIENT
Start: 2024-03-14

## 2024-03-14 RX ORDER — LEFLUNOMIDE 20 MG/1
20 TABLET ORAL DAILY
Status: DISCONTINUED | OUTPATIENT
Start: 2024-03-15 | End: 2024-03-18 | Stop reason: HOSPADM

## 2024-03-14 RX ORDER — FOLIC ACID 1 MG/1
1000 TABLET ORAL DAILY
Status: DISCONTINUED | OUTPATIENT
Start: 2024-03-14 | End: 2024-03-18 | Stop reason: HOSPADM

## 2024-03-14 RX ORDER — FUROSEMIDE 10 MG/ML
60 INJECTION INTRAMUSCULAR; INTRAVENOUS ONCE
Status: COMPLETED | OUTPATIENT
Start: 2024-03-14 | End: 2024-03-14

## 2024-03-14 RX ORDER — METHOCARBAMOL 750 MG/1
750 TABLET, FILM COATED ORAL 3 TIMES DAILY PRN
Status: DISCONTINUED | OUTPATIENT
Start: 2024-03-14 | End: 2024-03-18 | Stop reason: HOSPADM

## 2024-03-14 RX ORDER — DULOXETIN HYDROCHLORIDE 60 MG/1
60 CAPSULE, DELAYED RELEASE ORAL 2 TIMES DAILY
Status: DISCONTINUED | OUTPATIENT
Start: 2024-03-14 | End: 2024-03-18 | Stop reason: HOSPADM

## 2024-03-14 RX ORDER — MONTELUKAST SODIUM 10 MG/1
10 TABLET ORAL NIGHTLY
Status: DISCONTINUED | OUTPATIENT
Start: 2024-03-14 | End: 2024-03-18 | Stop reason: HOSPADM

## 2024-03-14 RX ORDER — RANOLAZINE 500 MG/1
500 TABLET, EXTENDED RELEASE ORAL 2 TIMES DAILY
Status: CANCELLED | OUTPATIENT
Start: 2024-03-14

## 2024-03-14 RX ORDER — ALBUTEROL SULFATE 2.5 MG/3ML
2.5 SOLUTION RESPIRATORY (INHALATION) EVERY 6 HOURS PRN
Status: DISCONTINUED | OUTPATIENT
Start: 2024-03-14 | End: 2024-03-18 | Stop reason: HOSPADM

## 2024-03-14 RX ORDER — MECLIZINE HCL 12.5 MG/1
25 TABLET ORAL 3 TIMES DAILY PRN
Status: DISCONTINUED | OUTPATIENT
Start: 2024-03-14 | End: 2024-03-18 | Stop reason: HOSPADM

## 2024-03-14 RX ORDER — IPRATROPIUM BROMIDE AND ALBUTEROL SULFATE 2.5; .5 MG/3ML; MG/3ML
1 SOLUTION RESPIRATORY (INHALATION) 3 TIMES DAILY
Status: DISCONTINUED | OUTPATIENT
Start: 2024-03-14 | End: 2024-03-16

## 2024-03-14 RX ORDER — BUTALBITAL, ACETAMINOPHEN AND CAFFEINE 50; 325; 40 MG/1; MG/1; MG/1
1 TABLET ORAL EVERY 6 HOURS PRN
Status: DISCONTINUED | OUTPATIENT
Start: 2024-03-14 | End: 2024-03-18 | Stop reason: HOSPADM

## 2024-03-14 RX ORDER — FUROSEMIDE 10 MG/ML
40 INJECTION INTRAMUSCULAR; INTRAVENOUS 2 TIMES DAILY
Status: DISCONTINUED | OUTPATIENT
Start: 2024-03-14 | End: 2024-03-16

## 2024-03-14 RX ADMIN — MONTELUKAST SODIUM 10 MG: 10 TABLET, FILM COATED ORAL at 20:55

## 2024-03-14 RX ADMIN — SULFASALAZINE 500 MG: 500 TABLET ORAL at 20:55

## 2024-03-14 RX ADMIN — PROPOFOL 35 MCG/KG/MIN: 10 INJECTION, EMULSION INTRAVENOUS at 16:42

## 2024-03-14 RX ADMIN — PIPERACILLIN AND TAZOBACTAM 3375 MG: 3; .375 INJECTION, POWDER, LYOPHILIZED, FOR SOLUTION INTRAVENOUS at 01:16

## 2024-03-14 RX ADMIN — PIPERACILLIN AND TAZOBACTAM 3375 MG: 3; .375 INJECTION, POWDER, LYOPHILIZED, FOR SOLUTION INTRAVENOUS at 10:40

## 2024-03-14 RX ADMIN — HEPARIN SODIUM 5000 UNITS: 5000 INJECTION INTRAVENOUS; SUBCUTANEOUS at 14:05

## 2024-03-14 RX ADMIN — HEPARIN SODIUM 5000 UNITS: 5000 INJECTION INTRAVENOUS; SUBCUTANEOUS at 05:44

## 2024-03-14 RX ADMIN — PROPOFOL 30 MCG/KG/MIN: 10 INJECTION, EMULSION INTRAVENOUS at 12:07

## 2024-03-14 RX ADMIN — PROPOFOL 40 MCG/KG/MIN: 10 INJECTION, EMULSION INTRAVENOUS at 21:47

## 2024-03-14 RX ADMIN — ROSUVASTATIN 20 MG: 20 TABLET, FILM COATED ORAL at 20:55

## 2024-03-14 RX ADMIN — FUROSEMIDE 60 MG: 10 INJECTION, SOLUTION INTRAMUSCULAR; INTRAVENOUS at 09:13

## 2024-03-14 RX ADMIN — Medication 150 MCG/HR: at 23:42

## 2024-03-14 RX ADMIN — IPRATROPIUM BROMIDE AND ALBUTEROL SULFATE 1 DOSE: .5; 3 SOLUTION RESPIRATORY (INHALATION) at 20:27

## 2024-03-14 RX ADMIN — Medication 100 MCG/HR: at 11:01

## 2024-03-14 RX ADMIN — FUROSEMIDE 40 MG: 10 INJECTION, SOLUTION INTRAMUSCULAR; INTRAVENOUS at 18:23

## 2024-03-14 RX ADMIN — IPRATROPIUM BROMIDE AND ALBUTEROL SULFATE 1 DOSE: .5; 3 SOLUTION RESPIRATORY (INHALATION) at 12:28

## 2024-03-14 RX ADMIN — Medication 10 ML: at 10:06

## 2024-03-14 RX ADMIN — Medication 10 ML: at 21:14

## 2024-03-14 RX ADMIN — SODIUM CHLORIDE, PRESERVATIVE FREE 10 ML: 5 INJECTION INTRAVENOUS at 21:14

## 2024-03-14 RX ADMIN — HEPARIN SODIUM 5000 UNITS: 5000 INJECTION INTRAVENOUS; SUBCUTANEOUS at 21:30

## 2024-03-14 RX ADMIN — SULFASALAZINE 500 MG: 500 TABLET ORAL at 09:13

## 2024-03-14 RX ADMIN — CLOPIDOGREL BISULFATE 75 MG: 75 TABLET ORAL at 09:13

## 2024-03-14 RX ADMIN — Medication 50 MCG/HR: at 02:58

## 2024-03-14 RX ADMIN — FOLIC ACID 1000 MCG: 1 TABLET ORAL at 10:51

## 2024-03-14 RX ADMIN — PIPERACILLIN AND TAZOBACTAM 3375 MG: 3; .375 INJECTION, POWDER, LYOPHILIZED, FOR SOLUTION INTRAVENOUS at 17:39

## 2024-03-14 RX ADMIN — PANTOPRAZOLE SODIUM 40 MG: 40 INJECTION, POWDER, FOR SOLUTION INTRAVENOUS at 09:13

## 2024-03-14 RX ADMIN — ASPIRIN 81 MG: 81 TABLET, COATED ORAL at 09:13

## 2024-03-14 RX ADMIN — IPRATROPIUM BROMIDE AND ALBUTEROL SULFATE 1 DOSE: .5; 3 SOLUTION RESPIRATORY (INHALATION) at 08:06

## 2024-03-14 RX ADMIN — PROPOFOL 20 MCG/KG/MIN: 10 INJECTION, EMULSION INTRAVENOUS at 06:17

## 2024-03-14 RX ADMIN — Medication 125 MCG/HR: at 14:48

## 2024-03-14 RX ADMIN — Medication 125 MCG/HR: at 19:26

## 2024-03-14 RX ADMIN — RANOLAZINE 500 MG: 500 TABLET, EXTENDED RELEASE ORAL at 09:13

## 2024-03-14 RX ADMIN — SODIUM CHLORIDE, PRESERVATIVE FREE 10 ML: 5 INJECTION INTRAVENOUS at 10:06

## 2024-03-14 ASSESSMENT — PAIN SCALES - GENERAL
PAINLEVEL_OUTOF10: 0

## 2024-03-14 ASSESSMENT — PULMONARY FUNCTION TESTS
PIF_VALUE: 21
PIF_VALUE: 21
PIF_VALUE: 25
PIF_VALUE: 21
PIF_VALUE: 21
PIF_VALUE: 20
PIF_VALUE: 21
PIF_VALUE: 20
PIF_VALUE: 21
PIF_VALUE: 22
PIF_VALUE: 22
PIF_VALUE: 20
PIF_VALUE: 21
PIF_VALUE: 21
PIF_VALUE: 20

## 2024-03-14 ASSESSMENT — PAIN SCALES - WONG BAKER
WONGBAKER_NUMERICALRESPONSE: NO HURT

## 2024-03-14 NOTE — RT PROTOCOL NOTE
RT Inhaler-Nebulizer Bronchodilator Protocol Note    There is a bronchodilator order in the chart from a provider indicating to follow the RT Bronchodilator Protocol and there is an “Initiate RT Inhaler-Nebulizer Bronchodilator Protocol” order as well (see protocol at bottom of note).    CXR Findings:  XR CHEST PORTABLE    Result Date: 3/13/2024  Interval placement of the right IJ and subclavian central venous catheter without discrete pneumothorax seen. Other supporting devices as above. Bilateral airspace opacities, which appears slightly worsened.     XR CHEST PORTABLE    Result Date: 3/13/2024  1. No difference in the appearance of multifocal fluffy airspace opacities throughout the bilateral lungs.     XR CHEST PORTABLE    Result Date: 3/13/2024  1. Bilateral infiltrates which could represent pneumonia or edema.  Follow-up to resolution is recommended. 2. Endotracheal tube in satisfactory position.       The findings from the last RT Protocol Assessment were as follows:   History Pulmonary Disease: Chronic pulmonary disease  Respiratory Pattern: Regular pattern and RR 12-20 bpm  Breath Sounds: Inspiratory and expiratory or bilateral wheezing and/or rhonchi  Cough: Weak, productive  Indication for Bronchodilator Therapy: None  Bronchodilator Assessment Score: 10    Aerosolized bronchodilator medication orders have been revised according to the RT Inhaler-Nebulizer Bronchodilator Protocol below.    Respiratory Therapist to perform RT Therapy Protocol Assessment initially then follow the protocol.  Repeat RT Therapy Protocol Assessment PRN for score 0-3 or on second treatment, BID, and PRN for scores above 3.    No Indications - adjust the frequency to every 6 hours PRN wheezing or bronchospasm, if no treatments needed after 48 hours then discontinue using Per Protocol order mode.     If indication present, adjust the RT bronchodilator orders based on the Bronchodilator Assessment Score as indicated below.  Use

## 2024-03-14 NOTE — CARE COORDINATION
03/14/24 0829   Readmission Assessment   Number of Days since last admission? 1-7 days   Previous Disposition Home with Family   Who is being Interviewed Unable to Complete  (intubated, chart review)   What was the patient's/caregiver's perception as to why they think they needed to return back to the hospital? Other (Comment)  (family called 911 for resp distress in car, intubated in ED, unresponsive on arrival)   Did you visit your Primary Care Physician after you left the hospital, before you returned this time? No   Why weren't you able to visit your PCP? Did not have an appointment   Did you see a specialist, such as Cardiac, Pulmonary, Orthopedic Physician, etc. after you left the hospital? No   Who advised the patient to return to the hospital? Other (Comment)  (family called 911)   Does the patient report anything that got in the way of taking their medications? No   In our efforts to provide the best possible care to you and others like you, can you think of anything that we could have done to help you after you left the hospital the first time, so that you might not have needed to return so soon? Identify patient's health literacy needs;Arrange for more help when leaving the hospital;Discharge instructions that are concise, clear, and non contradictory;Improved written discharge instructions

## 2024-03-14 NOTE — PROCEDURES
Crow Bonner is a 53 y.o. female patient.  1. Acute respiratory failure with hypoxia and hypercapnia ([facility])    2. Aspiration pneumonia of both lower lobes due to gastric secretions ([facility])    3. Acute encephalopathy    4. MARIA D (acute kidney injury) ([facility])    5. Increased ammonia level    6. Elevated troponin    7. Biliary sludge      Past Medical History:   Diagnosis Date    Acute CVA (cerebrovascular accident) ([facility]) 3/9/2023    Anxiety     Anxiety and depression     Arthritis     not sure of specific type    Asthma     CAD (coronary artery disease)     Cancer ([facility]) 2007    left breast    Cerebral artery occlusion with cerebral infarction ([facility])     2000, 2001    CHF (congestive heart failure) ([facility]) 2018    Chronic kidney disease     renal insufficency/to see Dr Romario Saeed    Chronic pain     Constipation     COPD (chronic obstructive pulmonary disease) ([facility])     Depression     Diabetes mellitus ([facility])     Diabetic polyneuropathy associated with type 2 diabetes mellitus ([facility]) 2/2/2018    Dysthymia 2/2/2018    ESBL (extended spectrum beta-lactamase) producing bacteria infection 03/14/2018    urine    Fibromyalgia     Gastric ulcer, unspecified as acute or chronic, without mention of hemorrhage, perforation, or obstruction     GERD (gastroesophageal reflux disease)     Gout     HIGH CHOLESTEROL     Hypertension     Hypothyroidism     Pneumonia 3/25/2020    Pyelonephritis     Severe persistent asthma without complication 2/2/2018    Thyroid disease     TIA (transient ischemic attack)     with occasional left leg and hand weakness     Blood pressure (!) 97/56, pulse 69, temperature (!) 102.9 °F (39.4 °C), temperature source Bladder, resp. rate 14, height 1.626 m (5' 4\"), weight 90.1 kg (198 lb 9.6 oz), SpO2 100 %, not currently breastfeeding.    Central Line    Date/Time: 3/13/2024 10:40 PM    Performed by: Tray Ugarte MD  Authorized by: Tray Ugarte MD  Consent: The procedure was performed in an emergent

## 2024-03-14 NOTE — OP NOTE
Bronchoscopy Procedure Note    Date of Operation: 3/14/24  Pre-op Diagnosis: Aspiration pneumonia/mucous plugging of airway  Post-op Diagnosis: Aspiration pneumonia  Surgeon: Flaco Walker MD  Anesthesia: General endotracheal anesthesia  Estimate Blood Loss: Minimal   Complications: None    Indications and History:  The patient is 53 y.o. female with acute on chronic hypoxic respiratory failure with concerns for severe aspiration pneumonia. The risks, benefits, complications, treatment options and expected outcomes were discussed with the patient's . The possibilities of reaction to medication, pulmonary aspiration, perforation of a viscus, bleeding, failure to diagnose a condition and creating a complication requiring transfusion or operation were discussed with the patient's  who freely signed the consent.     Description of Procedure:   The patient was in ICU 2, identified as [facility] and the procedure verified as flexible fiberoptic bronchoscopy. A time out was held and the above information confirmed.   After the induction of topical nasopharyngeal anesthesia, the patient was placed in appropriate position and the bronchoscope was passed through the LMA . The vocal cords were visualized and total of 6 ml of 2% Lidocaine was topically placed onto the cords. The cords were normal. The scope was then passed into the trachea. Lidocaine 2% 2 ml was used topically on the judith. Careful inspection of the tracheal lumen was accomplished. The scope was sequentially passed into the left main and then left upper and lower bronchi and segmental bronchi.   The scope was then withdrawn and advanced into the right main bronchus and then into the RUL, RML, and RLL bronchi and segmental bronchi.     Some airway erythema noted with scant secretions only, no significant mucous plugging or obvious foreign body noted.  Bronchial wash was performed.    Endobronchial findings:   Trachea: Normal

## 2024-03-14 NOTE — FLOWSHEET NOTE
4 Eyes Skin Assessment     NAME:  Crow Bonner  YOB: 1970  MEDICAL RECORD NUMBER:  [id number]    The patient is being assessed for  Admission    I agree that at least one RN has performed a thorough Head to Toe Skin Assessment on the patient. ALL assessment sites listed below have been assessed.      Areas assessed by both nurses:    Head, Face, Ears, Shoulders, Back, Chest, Arms, Elbows, Hands, Sacrum. Buttock, Coccyx, Ischium, Legs. Feet and Heels, and Under Medical Devices         Does the Patient have a Wound? No noted wound(s)       Clifford Prevention initiated by RN: Yes  Wound Care Orders initiated by RN: No    Pressure Injury (Stage 3,4, Unstageable, DTI, NWPT, and Complex wounds) if present, place Wound referral order by RN under : No    New Ostomies, if present place, Ostomy referral order under : No     Nurse 1 eSignature: Electronically signed by OMAR EPSTEIN RN on 3/14/24 at 2:14 AM EDT    **SHARE this note so that the co-signing nurse can place an eSignature**    Nurse 2 eSignature: Electronically signed by CHANO OSORIO RN on 3/14/24 at 2:15 AM EDT

## 2024-03-15 ENCOUNTER — APPOINTMENT (OUTPATIENT)
Dept: GENERAL RADIOLOGY | Age: 54
DRG: 720 | End: 2024-03-15
Payer: COMMERCIAL

## 2024-03-15 LAB
ALBUMIN SERPL-MCNC: 3 G/DL (ref 3.4–5)
ANION GAP SERPL CALCULATED.3IONS-SCNC: 12 MMOL/L (ref 3–16)
BASOPHILS # BLD: 0.1 K/UL (ref 0–0.2)
BASOPHILS NFR BLD: 0.5 %
BUN SERPL-MCNC: 33 MG/DL (ref 7–20)
CALCIUM SERPL-MCNC: 9 MG/DL (ref 8.3–10.6)
CHLORIDE SERPL-SCNC: 97 MMOL/L (ref 99–110)
CO2 SERPL-SCNC: 32 MMOL/L (ref 21–32)
CREAT SERPL-MCNC: 1.4 MG/DL (ref 0.6–1.1)
CREAT UR-MCNC: 12.2 MG/DL (ref 28–259)
DEPRECATED RDW RBC AUTO: 14.5 % (ref 12.4–15.4)
EOSINOPHIL # BLD: 0.2 K/UL (ref 0–0.6)
EOSINOPHIL NFR BLD: 1.2 %
EST. AVERAGE GLUCOSE BLD GHB EST-MCNC: 168.6 MG/DL
GFR SERPLBLD CREATININE-BSD FMLA CKD-EPI: 44 ML/MIN/{1.73_M2}
GLUCOSE BLD-MCNC: 202 MG/DL (ref 70–99)
GLUCOSE BLD-MCNC: 215 MG/DL (ref 70–99)
GLUCOSE BLD-MCNC: 252 MG/DL (ref 70–99)
GLUCOSE BLD-MCNC: 271 MG/DL (ref 70–99)
GLUCOSE BLD-MCNC: 272 MG/DL (ref 70–99)
GLUCOSE BLD-MCNC: 276 MG/DL (ref 70–99)
GLUCOSE BLD-MCNC: 316 MG/DL (ref 70–99)
GLUCOSE SERPL-MCNC: 265 MG/DL (ref 70–99)
HBA1C MFR BLD: 7.5 %
HCT VFR BLD AUTO: 30.9 % (ref 36–48)
HGB BLD-MCNC: 10.3 G/DL (ref 12–16)
LEGIONELLA AG UR QL: NORMAL
LYMPHOCYTES # BLD: 1.3 K/UL (ref 1–5.1)
LYMPHOCYTES NFR BLD: 8.4 %
MAGNESIUM SERPL-MCNC: 2.4 MG/DL (ref 1.8–2.4)
MCH RBC QN AUTO: 28.7 PG (ref 26–34)
MCHC RBC AUTO-ENTMCNC: 33.3 G/DL (ref 31–36)
MCV RBC AUTO: 86.2 FL (ref 80–100)
MONOCYTES # BLD: 1.2 K/UL (ref 0–1.3)
MONOCYTES NFR BLD: 7.4 %
MRSA DNA SPEC QL NAA+PROBE: NORMAL
NEUTROPHILS # BLD: 13 K/UL (ref 1.7–7.7)
NEUTROPHILS NFR BLD: 82.5 %
PERFORMED ON: ABNORMAL
PHOSPHATE SERPL-MCNC: 3.2 MG/DL (ref 2.5–4.9)
PLATELET # BLD AUTO: 208 K/UL (ref 135–450)
PMV BLD AUTO: 9 FL (ref 5–10.5)
POTASSIUM SERPL-SCNC: 3.4 MMOL/L (ref 3.5–5.1)
POTASSIUM SERPL-SCNC: 3.8 MMOL/L (ref 3.5–5.1)
PROT UR-MCNC: 6 MG/DL
PROT/CREAT UR-RTO: 0.5 MG/DL
RBC # BLD AUTO: 3.59 M/UL (ref 4–5.2)
S PNEUM AG UR QL: NORMAL
SODIUM SERPL-SCNC: 141 MMOL/L (ref 136–145)
T3 SERPL-MCNC: 0.59 NG/ML (ref 0.8–2)
T4 FREE SERPL-MCNC: 1.5 NG/DL (ref 0.9–1.8)
TSH SERPL DL<=0.005 MIU/L-ACNC: 0.19 UIU/ML (ref 0.27–4.2)
WBC # BLD AUTO: 15.7 K/UL (ref 4–11)

## 2024-03-15 PROCEDURE — 92526 ORAL FUNCTION THERAPY: CPT

## 2024-03-15 PROCEDURE — 94761 N-INVAS EAR/PLS OXIMETRY MLT: CPT

## 2024-03-15 PROCEDURE — 6360000002 HC RX W HCPCS: Performed by: INTERNAL MEDICINE

## 2024-03-15 PROCEDURE — 82570 ASSAY OF URINE CREATININE: CPT

## 2024-03-15 PROCEDURE — 6370000000 HC RX 637 (ALT 250 FOR IP): Performed by: STUDENT IN AN ORGANIZED HEALTH CARE EDUCATION/TRAINING PROGRAM

## 2024-03-15 PROCEDURE — 2580000003 HC RX 258: Performed by: STUDENT IN AN ORGANIZED HEALTH CARE EDUCATION/TRAINING PROGRAM

## 2024-03-15 PROCEDURE — 6360000002 HC RX W HCPCS: Performed by: STUDENT IN AN ORGANIZED HEALTH CARE EDUCATION/TRAINING PROGRAM

## 2024-03-15 PROCEDURE — 94003 VENT MGMT INPAT SUBQ DAY: CPT

## 2024-03-15 PROCEDURE — 2500000003 HC RX 250 WO HCPCS: Performed by: STUDENT IN AN ORGANIZED HEALTH CARE EDUCATION/TRAINING PROGRAM

## 2024-03-15 PROCEDURE — 2700000000 HC OXYGEN THERAPY PER DAY

## 2024-03-15 PROCEDURE — 82043 UR ALBUMIN QUANTITATIVE: CPT

## 2024-03-15 PROCEDURE — 99291 CRITICAL CARE FIRST HOUR: CPT | Performed by: INTERNAL MEDICINE

## 2024-03-15 PROCEDURE — 83735 ASSAY OF MAGNESIUM: CPT

## 2024-03-15 PROCEDURE — 2580000003 HC RX 258: Performed by: INTERNAL MEDICINE

## 2024-03-15 PROCEDURE — 94640 AIRWAY INHALATION TREATMENT: CPT

## 2024-03-15 PROCEDURE — 36592 COLLECT BLOOD FROM PICC: CPT

## 2024-03-15 PROCEDURE — 92610 EVALUATE SWALLOWING FUNCTION: CPT

## 2024-03-15 PROCEDURE — 71045 X-RAY EXAM CHEST 1 VIEW: CPT

## 2024-03-15 PROCEDURE — 80069 RENAL FUNCTION PANEL: CPT

## 2024-03-15 PROCEDURE — 84156 ASSAY OF PROTEIN URINE: CPT

## 2024-03-15 PROCEDURE — 84443 ASSAY THYROID STIM HORMONE: CPT

## 2024-03-15 PROCEDURE — 2000000000 HC ICU R&B

## 2024-03-15 PROCEDURE — 84480 ASSAY TRIIODOTHYRONINE (T3): CPT

## 2024-03-15 PROCEDURE — 6370000000 HC RX 637 (ALT 250 FOR IP): Performed by: INTERNAL MEDICINE

## 2024-03-15 PROCEDURE — 84132 ASSAY OF SERUM POTASSIUM: CPT

## 2024-03-15 PROCEDURE — 85025 COMPLETE CBC W/AUTO DIFF WBC: CPT

## 2024-03-15 PROCEDURE — 84439 ASSAY OF FREE THYROXINE: CPT

## 2024-03-15 PROCEDURE — C9113 INJ PANTOPRAZOLE SODIUM, VIA: HCPCS | Performed by: INTERNAL MEDICINE

## 2024-03-15 RX ORDER — DIAZEPAM 5 MG/1
5 TABLET ORAL 2 TIMES DAILY PRN
Status: DISCONTINUED | OUTPATIENT
Start: 2024-03-15 | End: 2024-03-16

## 2024-03-15 RX ORDER — POTASSIUM CHLORIDE 29.8 MG/ML
20 INJECTION INTRAVENOUS PRN
Status: DISCONTINUED | OUTPATIENT
Start: 2024-03-15 | End: 2024-03-18 | Stop reason: HOSPADM

## 2024-03-15 RX ORDER — HYDROMORPHONE HYDROCHLORIDE 1 MG/ML
1 INJECTION, SOLUTION INTRAMUSCULAR; INTRAVENOUS; SUBCUTANEOUS
Status: DISCONTINUED | OUTPATIENT
Start: 2024-03-15 | End: 2024-03-18 | Stop reason: HOSPADM

## 2024-03-15 RX ORDER — OXYCODONE AND ACETAMINOPHEN 10; 325 MG/1; MG/1
1 TABLET ORAL EVERY 4 HOURS PRN
Status: DISCONTINUED | OUTPATIENT
Start: 2024-03-15 | End: 2024-03-18 | Stop reason: HOSPADM

## 2024-03-15 RX ORDER — POTASSIUM CHLORIDE 7.45 MG/ML
10 INJECTION INTRAVENOUS PRN
Status: DISCONTINUED | OUTPATIENT
Start: 2024-03-15 | End: 2024-03-18 | Stop reason: HOSPADM

## 2024-03-15 RX ORDER — INSULIN GLARGINE 100 [IU]/ML
10 INJECTION, SOLUTION SUBCUTANEOUS DAILY
Status: DISCONTINUED | OUTPATIENT
Start: 2024-03-15 | End: 2024-03-16

## 2024-03-15 RX ORDER — MAGNESIUM SULFATE IN WATER 40 MG/ML
2000 INJECTION, SOLUTION INTRAVENOUS PRN
Status: DISCONTINUED | OUTPATIENT
Start: 2024-03-15 | End: 2024-03-18 | Stop reason: HOSPADM

## 2024-03-15 RX ORDER — METOPROLOL TARTRATE 1 MG/ML
5 INJECTION, SOLUTION INTRAVENOUS EVERY 6 HOURS PRN
Status: DISCONTINUED | OUTPATIENT
Start: 2024-03-15 | End: 2024-03-18 | Stop reason: HOSPADM

## 2024-03-15 RX ADMIN — PANTOPRAZOLE SODIUM 40 MG: 40 INJECTION, POWDER, FOR SOLUTION INTRAVENOUS at 09:31

## 2024-03-15 RX ADMIN — ROSUVASTATIN 20 MG: 20 TABLET, FILM COATED ORAL at 20:17

## 2024-03-15 RX ADMIN — POTASSIUM CHLORIDE 20 MEQ: 29.8 INJECTION, SOLUTION INTRAVENOUS at 16:21

## 2024-03-15 RX ADMIN — PIPERACILLIN AND TAZOBACTAM 3375 MG: 3; .375 INJECTION, POWDER, LYOPHILIZED, FOR SOLUTION INTRAVENOUS at 01:03

## 2024-03-15 RX ADMIN — SULFASALAZINE 500 MG: 500 TABLET ORAL at 20:16

## 2024-03-15 RX ADMIN — OXYCODONE AND ACETAMINOPHEN 1 TABLET: 10; 325 TABLET ORAL at 21:36

## 2024-03-15 RX ADMIN — PROPOFOL 50 MCG/KG/MIN: 10 INJECTION, EMULSION INTRAVENOUS at 08:07

## 2024-03-15 RX ADMIN — LEVOTHYROXINE SODIUM 150 MCG: 0.15 TABLET ORAL at 06:39

## 2024-03-15 RX ADMIN — PIPERACILLIN AND TAZOBACTAM 3375 MG: 3; .375 INJECTION, POWDER, LYOPHILIZED, FOR SOLUTION INTRAVENOUS at 09:40

## 2024-03-15 RX ADMIN — INSULIN LISPRO 6 UNITS: 100 INJECTION, SOLUTION INTRAVENOUS; SUBCUTANEOUS at 16:22

## 2024-03-15 RX ADMIN — SODIUM CHLORIDE, PRESERVATIVE FREE 10 ML: 5 INJECTION INTRAVENOUS at 09:00

## 2024-03-15 RX ADMIN — HEPARIN SODIUM 5000 UNITS: 5000 INJECTION INTRAVENOUS; SUBCUTANEOUS at 22:34

## 2024-03-15 RX ADMIN — IPRATROPIUM BROMIDE AND ALBUTEROL SULFATE 1 DOSE: .5; 3 SOLUTION RESPIRATORY (INHALATION) at 08:20

## 2024-03-15 RX ADMIN — MONTELUKAST SODIUM 10 MG: 10 TABLET, FILM COATED ORAL at 20:17

## 2024-03-15 RX ADMIN — Medication 10 ML: at 20:21

## 2024-03-15 RX ADMIN — HEPARIN SODIUM 5000 UNITS: 5000 INJECTION INTRAVENOUS; SUBCUTANEOUS at 15:51

## 2024-03-15 RX ADMIN — Medication 10 ML: at 09:00

## 2024-03-15 RX ADMIN — CLOPIDOGREL BISULFATE 75 MG: 75 TABLET ORAL at 15:48

## 2024-03-15 RX ADMIN — POTASSIUM CHLORIDE 20 MEQ: 29.8 INJECTION, SOLUTION INTRAVENOUS at 17:54

## 2024-03-15 RX ADMIN — HEPARIN SODIUM 5000 UNITS: 5000 INJECTION INTRAVENOUS; SUBCUTANEOUS at 05:29

## 2024-03-15 RX ADMIN — FUROSEMIDE 40 MG: 10 INJECTION, SOLUTION INTRAMUSCULAR; INTRAVENOUS at 08:46

## 2024-03-15 RX ADMIN — METOPROLOL TARTRATE 5 MG: 5 INJECTION INTRAVENOUS at 13:06

## 2024-03-15 RX ADMIN — SODIUM CHLORIDE, PRESERVATIVE FREE 10 ML: 5 INJECTION INTRAVENOUS at 20:20

## 2024-03-15 RX ADMIN — SACUBITRIL AND VALSARTAN 0.5 TABLET: 24; 26 TABLET, FILM COATED ORAL at 20:16

## 2024-03-15 RX ADMIN — LEFLUNOMIDE 20 MG: 20 TABLET ORAL at 20:17

## 2024-03-15 RX ADMIN — ACETAMINOPHEN 650 MG: 325 TABLET ORAL at 01:04

## 2024-03-15 RX ADMIN — INSULIN LISPRO 4 UNITS: 100 INJECTION, SOLUTION INTRAVENOUS; SUBCUTANEOUS at 05:06

## 2024-03-15 RX ADMIN — PROPOFOL 50 MCG/KG/MIN: 10 INJECTION, EMULSION INTRAVENOUS at 05:11

## 2024-03-15 RX ADMIN — Medication 150 MCG/HR: at 06:33

## 2024-03-15 RX ADMIN — PROPOFOL 50 MCG/KG/MIN: 10 INJECTION, EMULSION INTRAVENOUS at 02:05

## 2024-03-15 RX ADMIN — INSULIN LISPRO 4 UNITS: 100 INJECTION, SOLUTION INTRAVENOUS; SUBCUTANEOUS at 20:34

## 2024-03-15 RX ADMIN — PIPERACILLIN AND TAZOBACTAM 3375 MG: 3; .375 INJECTION, POWDER, LYOPHILIZED, FOR SOLUTION INTRAVENOUS at 16:28

## 2024-03-15 RX ADMIN — INSULIN LISPRO 2 UNITS: 100 INJECTION, SOLUTION INTRAVENOUS; SUBCUTANEOUS at 00:49

## 2024-03-15 RX ADMIN — FUROSEMIDE 40 MG: 10 INJECTION, SOLUTION INTRAMUSCULAR; INTRAVENOUS at 17:55

## 2024-03-15 RX ADMIN — ASPIRIN 81 MG: 81 TABLET, CHEWABLE ORAL at 15:48

## 2024-03-15 RX ADMIN — SACUBITRIL AND VALSARTAN 0.5 TABLET: 24; 26 TABLET, FILM COATED ORAL at 15:54

## 2024-03-15 RX ADMIN — INSULIN GLARGINE 10 UNITS: 100 INJECTION, SOLUTION SUBCUTANEOUS at 09:32

## 2024-03-15 RX ADMIN — Medication 150 MCG/HR: at 03:07

## 2024-03-15 RX ADMIN — IPRATROPIUM BROMIDE AND ALBUTEROL SULFATE 1 DOSE: .5; 3 SOLUTION RESPIRATORY (INHALATION) at 21:48

## 2024-03-15 RX ADMIN — METOPROLOL TARTRATE 25 MG: 25 TABLET, FILM COATED ORAL at 20:17

## 2024-03-15 ASSESSMENT — PULMONARY FUNCTION TESTS
PIF_VALUE: 21
PIF_VALUE: 20
PIF_VALUE: 21
PIF_VALUE: 21
PIF_VALUE: 20
PIF_VALUE: 21
PIF_VALUE: 20
PIF_VALUE: 20
PIF_VALUE: 21
PIF_VALUE: 20
PIF_VALUE: 21
PIF_VALUE: 20
PIF_VALUE: 21
PIF_VALUE: 16
PIF_VALUE: 21
PIF_VALUE: 20
PIF_VALUE: 21
PIF_VALUE: 20
PIF_VALUE: 20

## 2024-03-15 ASSESSMENT — PAIN SCALES - WONG BAKER
WONGBAKER_NUMERICALRESPONSE: NO HURT

## 2024-03-15 ASSESSMENT — PAIN DESCRIPTION - LOCATION
LOCATION: ABDOMEN;HEAD;THROAT
LOCATION: ABDOMEN;HEAD

## 2024-03-15 ASSESSMENT — PAIN SCALES - GENERAL
PAINLEVEL_OUTOF10: 0
PAINLEVEL_OUTOF10: 1
PAINLEVEL_OUTOF10: 8
PAINLEVEL_OUTOF10: 5

## 2024-03-15 NOTE — PLAN OF CARE
Problem: Safety - Adult  Goal: Free from fall injury  Outcome: Progressing  Note: Fall risk band on patient. Non skid footwear in place. Alarms used appropriately. Patient instructed to call and wait for staff before getting up. Rounding done to anticipate needs. Appropriate safety devices used for transfers. Bedside table within reach. Call light within reach. Pt denies further needs at this time. Will continue to monitor for changes.

## 2024-03-16 LAB
ALBUMIN SERPL-MCNC: 3.4 G/DL (ref 3.4–5)
ANION GAP SERPL CALCULATED.3IONS-SCNC: 13 MMOL/L (ref 3–16)
BACTERIA SPEC RESP CULT: NORMAL
BASOPHILS # BLD: 0 K/UL (ref 0–0.2)
BASOPHILS NFR BLD: 0.3 %
BUN SERPL-MCNC: 27 MG/DL (ref 7–20)
CALCIUM SERPL-MCNC: 9.2 MG/DL (ref 8.3–10.6)
CHLORIDE SERPL-SCNC: 98 MMOL/L (ref 99–110)
CO2 SERPL-SCNC: 32 MMOL/L (ref 21–32)
CREAT SERPL-MCNC: 1.2 MG/DL (ref 0.6–1.1)
CREAT UR-MCNC: 14 MG/DL (ref 28–259)
DEPRECATED RDW RBC AUTO: 14.6 % (ref 12.4–15.4)
EOSINOPHIL # BLD: 0.1 K/UL (ref 0–0.6)
EOSINOPHIL NFR BLD: 1.3 %
GFR SERPLBLD CREATININE-BSD FMLA CKD-EPI: 53 ML/MIN/{1.73_M2}
GLUCOSE BLD-MCNC: 237 MG/DL (ref 70–99)
GLUCOSE BLD-MCNC: 260 MG/DL (ref 70–99)
GLUCOSE BLD-MCNC: 265 MG/DL (ref 70–99)
GLUCOSE BLD-MCNC: 277 MG/DL (ref 70–99)
GLUCOSE BLD-MCNC: 285 MG/DL (ref 70–99)
GLUCOSE BLD-MCNC: 285 MG/DL (ref 70–99)
GLUCOSE BLD-MCNC: 348 MG/DL (ref 70–99)
GLUCOSE SERPL-MCNC: 307 MG/DL (ref 70–99)
GRAM STN SPEC: NORMAL
HCT VFR BLD AUTO: 31.7 % (ref 36–48)
HGB BLD-MCNC: 10.3 G/DL (ref 12–16)
LYMPHOCYTES # BLD: 1.4 K/UL (ref 1–5.1)
LYMPHOCYTES NFR BLD: 11.9 %
MAGNESIUM SERPL-MCNC: 2.1 MG/DL (ref 1.8–2.4)
MCH RBC QN AUTO: 28.1 PG (ref 26–34)
MCHC RBC AUTO-ENTMCNC: 32.6 G/DL (ref 31–36)
MCV RBC AUTO: 86.2 FL (ref 80–100)
MICROALBUMIN UR DL<=1MG/L-MCNC: 1.3 MG/DL
MICROALBUMIN/CREAT UR: 92.9 MG/G (ref 0–30)
MONOCYTES # BLD: 0.8 K/UL (ref 0–1.3)
MONOCYTES NFR BLD: 6.9 %
NEUTROPHILS # BLD: 9.1 K/UL (ref 1.7–7.7)
NEUTROPHILS NFR BLD: 79.6 %
PERFORMED ON: ABNORMAL
PHOSPHATE SERPL-MCNC: 2.2 MG/DL (ref 2.5–4.9)
PHOSPHATE SERPL-MCNC: 2.4 MG/DL (ref 2.5–4.9)
PLATELET # BLD AUTO: 205 K/UL (ref 135–450)
PMV BLD AUTO: 8.8 FL (ref 5–10.5)
POTASSIUM SERPL-SCNC: 3.6 MMOL/L (ref 3.5–5.1)
RBC # BLD AUTO: 3.68 M/UL (ref 4–5.2)
SODIUM SERPL-SCNC: 143 MMOL/L (ref 136–145)
WBC # BLD AUTO: 11.4 K/UL (ref 4–11)

## 2024-03-16 PROCEDURE — 6360000002 HC RX W HCPCS: Performed by: STUDENT IN AN ORGANIZED HEALTH CARE EDUCATION/TRAINING PROGRAM

## 2024-03-16 PROCEDURE — 2580000003 HC RX 258: Performed by: STUDENT IN AN ORGANIZED HEALTH CARE EDUCATION/TRAINING PROGRAM

## 2024-03-16 PROCEDURE — 2500000003 HC RX 250 WO HCPCS: Performed by: STUDENT IN AN ORGANIZED HEALTH CARE EDUCATION/TRAINING PROGRAM

## 2024-03-16 PROCEDURE — 2580000003 HC RX 258: Performed by: INTERNAL MEDICINE

## 2024-03-16 PROCEDURE — 6370000000 HC RX 637 (ALT 250 FOR IP): Performed by: INTERNAL MEDICINE

## 2024-03-16 PROCEDURE — 80069 RENAL FUNCTION PANEL: CPT

## 2024-03-16 PROCEDURE — 83735 ASSAY OF MAGNESIUM: CPT

## 2024-03-16 PROCEDURE — 36592 COLLECT BLOOD FROM PICC: CPT

## 2024-03-16 PROCEDURE — 94761 N-INVAS EAR/PLS OXIMETRY MLT: CPT

## 2024-03-16 PROCEDURE — 99233 SBSQ HOSP IP/OBS HIGH 50: CPT | Performed by: INTERNAL MEDICINE

## 2024-03-16 PROCEDURE — 2500000003 HC RX 250 WO HCPCS: Performed by: INTERNAL MEDICINE

## 2024-03-16 PROCEDURE — 1200000000 HC SEMI PRIVATE

## 2024-03-16 PROCEDURE — C9113 INJ PANTOPRAZOLE SODIUM, VIA: HCPCS | Performed by: INTERNAL MEDICINE

## 2024-03-16 PROCEDURE — 6370000000 HC RX 637 (ALT 250 FOR IP): Performed by: STUDENT IN AN ORGANIZED HEALTH CARE EDUCATION/TRAINING PROGRAM

## 2024-03-16 PROCEDURE — 94640 AIRWAY INHALATION TREATMENT: CPT

## 2024-03-16 PROCEDURE — 2700000000 HC OXYGEN THERAPY PER DAY

## 2024-03-16 PROCEDURE — 85025 COMPLETE CBC W/AUTO DIFF WBC: CPT

## 2024-03-16 PROCEDURE — 6370000000 HC RX 637 (ALT 250 FOR IP): Performed by: NURSE PRACTITIONER

## 2024-03-16 PROCEDURE — 6360000002 HC RX W HCPCS: Performed by: INTERNAL MEDICINE

## 2024-03-16 PROCEDURE — 84100 ASSAY OF PHOSPHORUS: CPT

## 2024-03-16 RX ORDER — IPRATROPIUM BROMIDE AND ALBUTEROL SULFATE 2.5; .5 MG/3ML; MG/3ML
1 SOLUTION RESPIRATORY (INHALATION)
Status: DISCONTINUED | OUTPATIENT
Start: 2024-03-16 | End: 2024-03-18 | Stop reason: HOSPADM

## 2024-03-16 RX ORDER — INSULIN GLARGINE 100 [IU]/ML
15 INJECTION, SOLUTION SUBCUTANEOUS DAILY
Status: DISCONTINUED | OUTPATIENT
Start: 2024-03-16 | End: 2024-03-17

## 2024-03-16 RX ORDER — TORSEMIDE 100 MG/1
50 TABLET ORAL EVERY EVENING
Status: DISCONTINUED | OUTPATIENT
Start: 2024-03-16 | End: 2024-03-18 | Stop reason: HOSPADM

## 2024-03-16 RX ORDER — TORSEMIDE 100 MG/1
100 TABLET ORAL DAILY
Status: DISCONTINUED | OUTPATIENT
Start: 2024-03-17 | End: 2024-03-18 | Stop reason: HOSPADM

## 2024-03-16 RX ORDER — LOPERAMIDE HYDROCHLORIDE 2 MG/1
4 CAPSULE ORAL 3 TIMES DAILY PRN
Status: DISCONTINUED | OUTPATIENT
Start: 2024-03-16 | End: 2024-03-18 | Stop reason: HOSPADM

## 2024-03-16 RX ORDER — BENZONATATE 100 MG/1
100 CAPSULE ORAL 3 TIMES DAILY PRN
Status: DISCONTINUED | OUTPATIENT
Start: 2024-03-16 | End: 2024-03-18 | Stop reason: HOSPADM

## 2024-03-16 RX ORDER — SPIRONOLACTONE 25 MG/1
50 TABLET ORAL 2 TIMES DAILY
Status: DISCONTINUED | OUTPATIENT
Start: 2024-03-16 | End: 2024-03-18 | Stop reason: HOSPADM

## 2024-03-16 RX ORDER — OXYCODONE HCL 10 MG/1
20 TABLET, FILM COATED, EXTENDED RELEASE ORAL EVERY 12 HOURS
Status: DISCONTINUED | OUTPATIENT
Start: 2024-03-16 | End: 2024-03-18 | Stop reason: HOSPADM

## 2024-03-16 RX ORDER — LANOLIN ALCOHOL/MO/W.PET/CERES
3 CREAM (GRAM) TOPICAL NIGHTLY PRN
Status: DISCONTINUED | OUTPATIENT
Start: 2024-03-16 | End: 2024-03-18 | Stop reason: HOSPADM

## 2024-03-16 RX ADMIN — SPIRONOLACTONE 50 MG: 25 TABLET ORAL at 12:26

## 2024-03-16 RX ADMIN — TORSEMIDE 50 MG: 100 TABLET ORAL at 17:16

## 2024-03-16 RX ADMIN — METOPROLOL TARTRATE 25 MG: 25 TABLET, FILM COATED ORAL at 21:19

## 2024-03-16 RX ADMIN — PANTOPRAZOLE SODIUM 40 MG: 40 INJECTION, POWDER, FOR SOLUTION INTRAVENOUS at 09:03

## 2024-03-16 RX ADMIN — SPIRONOLACTONE 50 MG: 25 TABLET ORAL at 21:20

## 2024-03-16 RX ADMIN — SULFASALAZINE 500 MG: 500 TABLET ORAL at 21:19

## 2024-03-16 RX ADMIN — SACUBITRIL AND VALSARTAN 0.5 TABLET: 24; 26 TABLET, FILM COATED ORAL at 21:19

## 2024-03-16 RX ADMIN — SULFASALAZINE 500 MG: 500 TABLET ORAL at 09:01

## 2024-03-16 RX ADMIN — ROSUVASTATIN 20 MG: 20 TABLET, FILM COATED ORAL at 21:19

## 2024-03-16 RX ADMIN — SODIUM CHLORIDE, PRESERVATIVE FREE 10 ML: 5 INJECTION INTRAVENOUS at 09:07

## 2024-03-16 RX ADMIN — INSULIN LISPRO 4 UNITS: 100 INJECTION, SOLUTION INTRAVENOUS; SUBCUTANEOUS at 05:49

## 2024-03-16 RX ADMIN — INSULIN LISPRO 4 UNITS: 100 INJECTION, SOLUTION INTRAVENOUS; SUBCUTANEOUS at 00:47

## 2024-03-16 RX ADMIN — Medication 10 ML: at 09:07

## 2024-03-16 RX ADMIN — PIPERACILLIN AND TAZOBACTAM 3375 MG: 3; .375 INJECTION, POWDER, LYOPHILIZED, FOR SOLUTION INTRAVENOUS at 17:12

## 2024-03-16 RX ADMIN — MONTELUKAST SODIUM 10 MG: 10 TABLET, FILM COATED ORAL at 21:19

## 2024-03-16 RX ADMIN — Medication 10 ML: at 21:32

## 2024-03-16 RX ADMIN — SODIUM CHLORIDE, PRESERVATIVE FREE 10 ML: 5 INJECTION INTRAVENOUS at 21:29

## 2024-03-16 RX ADMIN — FOLIC ACID 1000 MCG: 1 TABLET ORAL at 09:04

## 2024-03-16 RX ADMIN — Medication 15 MMOL: at 22:22

## 2024-03-16 RX ADMIN — BENZONATATE 100 MG: 100 CAPSULE ORAL at 22:02

## 2024-03-16 RX ADMIN — INSULIN LISPRO 4 UNITS: 100 INJECTION, SOLUTION INTRAVENOUS; SUBCUTANEOUS at 09:02

## 2024-03-16 RX ADMIN — LEFLUNOMIDE 20 MG: 20 TABLET ORAL at 10:53

## 2024-03-16 RX ADMIN — DIAZEPAM 5 MG: 5 TABLET ORAL at 00:38

## 2024-03-16 RX ADMIN — ALBUTEROL SULFATE 2.5 MG: 2.5 SOLUTION RESPIRATORY (INHALATION) at 04:30

## 2024-03-16 RX ADMIN — MELATONIN TAB 3 MG 3 MG: 3 TAB at 22:54

## 2024-03-16 RX ADMIN — FUROSEMIDE 40 MG: 10 INJECTION, SOLUTION INTRAMUSCULAR; INTRAVENOUS at 09:03

## 2024-03-16 RX ADMIN — IPRATROPIUM BROMIDE AND ALBUTEROL SULFATE 1 DOSE: .5; 3 SOLUTION RESPIRATORY (INHALATION) at 12:21

## 2024-03-16 RX ADMIN — CLOPIDOGREL BISULFATE 75 MG: 75 TABLET ORAL at 09:04

## 2024-03-16 RX ADMIN — INSULIN LISPRO 6 UNITS: 100 INJECTION, SOLUTION INTRAVENOUS; SUBCUTANEOUS at 12:25

## 2024-03-16 RX ADMIN — HEPARIN SODIUM 5000 UNITS: 5000 INJECTION INTRAVENOUS; SUBCUTANEOUS at 05:49

## 2024-03-16 RX ADMIN — ASPIRIN 81 MG: 81 TABLET, CHEWABLE ORAL at 09:03

## 2024-03-16 RX ADMIN — HEPARIN SODIUM 5000 UNITS: 5000 INJECTION INTRAVENOUS; SUBCUTANEOUS at 12:26

## 2024-03-16 RX ADMIN — HEPARIN SODIUM 5000 UNITS: 5000 INJECTION INTRAVENOUS; SUBCUTANEOUS at 21:32

## 2024-03-16 RX ADMIN — METOPROLOL TARTRATE 25 MG: 25 TABLET, FILM COATED ORAL at 09:03

## 2024-03-16 RX ADMIN — BUTALBITAL, ACETAMINOPHEN, AND CAFFEINE 1 TABLET: 50; 325; 40 TABLET ORAL at 03:40

## 2024-03-16 RX ADMIN — SACUBITRIL AND VALSARTAN 0.5 TABLET: 24; 26 TABLET, FILM COATED ORAL at 09:04

## 2024-03-16 RX ADMIN — INSULIN GLARGINE 15 UNITS: 100 INJECTION, SOLUTION SUBCUTANEOUS at 09:02

## 2024-03-16 RX ADMIN — PIPERACILLIN AND TAZOBACTAM 3375 MG: 3; .375 INJECTION, POWDER, LYOPHILIZED, FOR SOLUTION INTRAVENOUS at 09:01

## 2024-03-16 RX ADMIN — OXYCODONE AND ACETAMINOPHEN 1 TABLET: 10; 325 TABLET ORAL at 17:15

## 2024-03-16 RX ADMIN — INSULIN LISPRO 4 UNITS: 100 INJECTION, SOLUTION INTRAVENOUS; SUBCUTANEOUS at 17:14

## 2024-03-16 RX ADMIN — BUTALBITAL, ACETAMINOPHEN, AND CAFFEINE 1 TABLET: 50; 325; 40 TABLET ORAL at 10:53

## 2024-03-16 RX ADMIN — OXYCODONE HYDROCHLORIDE 20 MG: 10 TABLET, FILM COATED, EXTENDED RELEASE ORAL at 12:26

## 2024-03-16 RX ADMIN — PIPERACILLIN AND TAZOBACTAM 3375 MG: 3; .375 INJECTION, POWDER, LYOPHILIZED, FOR SOLUTION INTRAVENOUS at 00:49

## 2024-03-16 RX ADMIN — INSULIN LISPRO 2 UNITS: 100 INJECTION, SOLUTION INTRAVENOUS; SUBCUTANEOUS at 21:20

## 2024-03-16 RX ADMIN — IPRATROPIUM BROMIDE AND ALBUTEROL SULFATE 1 DOSE: .5; 3 SOLUTION RESPIRATORY (INHALATION) at 19:52

## 2024-03-16 RX ADMIN — Medication 15.06 MMOL: at 07:13

## 2024-03-16 RX ADMIN — LEVOTHYROXINE SODIUM 150 MCG: 0.15 TABLET ORAL at 09:04

## 2024-03-16 RX ADMIN — IPRATROPIUM BROMIDE AND ALBUTEROL SULFATE 1 DOSE: .5; 3 SOLUTION RESPIRATORY (INHALATION) at 07:31

## 2024-03-16 ASSESSMENT — PAIN DESCRIPTION - LOCATION
LOCATION: GENERALIZED
LOCATION: HEAD
LOCATION: GENERALIZED

## 2024-03-16 ASSESSMENT — PAIN DESCRIPTION - DESCRIPTORS
DESCRIPTORS: ACHING;POUNDING;SHOOTING
DESCRIPTORS: ACHING;DISCOMFORT

## 2024-03-16 ASSESSMENT — PAIN SCALES - GENERAL
PAINLEVEL_OUTOF10: 6
PAINLEVEL_OUTOF10: 0
PAINLEVEL_OUTOF10: 0
PAINLEVEL_OUTOF10: 4
PAINLEVEL_OUTOF10: 0
PAINLEVEL_OUTOF10: 7

## 2024-03-16 ASSESSMENT — PAIN SCALES - WONG BAKER

## 2024-03-16 ASSESSMENT — PAIN - FUNCTIONAL ASSESSMENT: PAIN_FUNCTIONAL_ASSESSMENT: ACTIVITIES ARE NOT PREVENTED

## 2024-03-17 ENCOUNTER — APPOINTMENT (OUTPATIENT)
Dept: GENERAL RADIOLOGY | Age: 54
DRG: 720 | End: 2024-03-17
Payer: COMMERCIAL

## 2024-03-17 LAB
ALBUMIN SERPL-MCNC: 3.4 G/DL (ref 3.4–5)
ANION GAP SERPL CALCULATED.3IONS-SCNC: 11 MMOL/L (ref 3–16)
BACTERIA BLD CULT ORG #2: NORMAL
BACTERIA BLD CULT: NORMAL
BASOPHILS # BLD: 0.1 K/UL (ref 0–0.2)
BASOPHILS NFR BLD: 0.9 %
BUN SERPL-MCNC: 20 MG/DL (ref 7–20)
CALCIUM SERPL-MCNC: 9.1 MG/DL (ref 8.3–10.6)
CHLORIDE SERPL-SCNC: 100 MMOL/L (ref 99–110)
CO2 SERPL-SCNC: 34 MMOL/L (ref 21–32)
CREAT SERPL-MCNC: 0.9 MG/DL (ref 0.6–1.1)
DEPRECATED RDW RBC AUTO: 14.2 % (ref 12.4–15.4)
EOSINOPHIL # BLD: 0.2 K/UL (ref 0–0.6)
EOSINOPHIL NFR BLD: 2.1 %
GFR SERPLBLD CREATININE-BSD FMLA CKD-EPI: >60 ML/MIN/{1.73_M2}
GLUCOSE BLD-MCNC: 229 MG/DL (ref 70–99)
GLUCOSE BLD-MCNC: 241 MG/DL (ref 70–99)
GLUCOSE BLD-MCNC: 242 MG/DL (ref 70–99)
GLUCOSE BLD-MCNC: 258 MG/DL (ref 70–99)
GLUCOSE BLD-MCNC: 262 MG/DL (ref 70–99)
GLUCOSE BLD-MCNC: 297 MG/DL (ref 70–99)
GLUCOSE SERPL-MCNC: 237 MG/DL (ref 70–99)
HCT VFR BLD AUTO: 33.2 % (ref 36–48)
HGB BLD-MCNC: 10.8 G/DL (ref 12–16)
LYMPHOCYTES # BLD: 1.2 K/UL (ref 1–5.1)
LYMPHOCYTES NFR BLD: 12.2 %
MAGNESIUM SERPL-MCNC: 1.8 MG/DL (ref 1.8–2.4)
MCH RBC QN AUTO: 28.1 PG (ref 26–34)
MCHC RBC AUTO-ENTMCNC: 32.5 G/DL (ref 31–36)
MCV RBC AUTO: 86.4 FL (ref 80–100)
MONOCYTES # BLD: 0.7 K/UL (ref 0–1.3)
MONOCYTES NFR BLD: 7.5 %
NEUTROPHILS # BLD: 7.7 K/UL (ref 1.7–7.7)
NEUTROPHILS NFR BLD: 77.3 %
NT-PROBNP SERPL-MCNC: 355 PG/ML (ref 0–124)
PERFORMED ON: ABNORMAL
PHOSPHATE SERPL-MCNC: 3 MG/DL (ref 2.5–4.9)
PHOSPHATE SERPL-MCNC: 3.2 MG/DL (ref 2.5–4.9)
PLATELET # BLD AUTO: 216 K/UL (ref 135–450)
PMV BLD AUTO: 8.8 FL (ref 5–10.5)
POTASSIUM SERPL-SCNC: 3.3 MMOL/L (ref 3.5–5.1)
POTASSIUM SERPL-SCNC: 4.1 MMOL/L (ref 3.5–5.1)
RBC # BLD AUTO: 3.84 M/UL (ref 4–5.2)
SODIUM SERPL-SCNC: 145 MMOL/L (ref 136–145)
WBC # BLD AUTO: 9.9 K/UL (ref 4–11)

## 2024-03-17 PROCEDURE — 6360000002 HC RX W HCPCS: Performed by: INTERNAL MEDICINE

## 2024-03-17 PROCEDURE — 80069 RENAL FUNCTION PANEL: CPT

## 2024-03-17 PROCEDURE — C9113 INJ PANTOPRAZOLE SODIUM, VIA: HCPCS | Performed by: INTERNAL MEDICINE

## 2024-03-17 PROCEDURE — 84100 ASSAY OF PHOSPHORUS: CPT

## 2024-03-17 PROCEDURE — 2580000003 HC RX 258: Performed by: STUDENT IN AN ORGANIZED HEALTH CARE EDUCATION/TRAINING PROGRAM

## 2024-03-17 PROCEDURE — 6370000000 HC RX 637 (ALT 250 FOR IP): Performed by: STUDENT IN AN ORGANIZED HEALTH CARE EDUCATION/TRAINING PROGRAM

## 2024-03-17 PROCEDURE — 6370000000 HC RX 637 (ALT 250 FOR IP)

## 2024-03-17 PROCEDURE — 6360000002 HC RX W HCPCS: Performed by: STUDENT IN AN ORGANIZED HEALTH CARE EDUCATION/TRAINING PROGRAM

## 2024-03-17 PROCEDURE — 94761 N-INVAS EAR/PLS OXIMETRY MLT: CPT

## 2024-03-17 PROCEDURE — 1200000000 HC SEMI PRIVATE

## 2024-03-17 PROCEDURE — 36592 COLLECT BLOOD FROM PICC: CPT

## 2024-03-17 PROCEDURE — 6370000000 HC RX 637 (ALT 250 FOR IP): Performed by: NURSE PRACTITIONER

## 2024-03-17 PROCEDURE — 94640 AIRWAY INHALATION TREATMENT: CPT

## 2024-03-17 PROCEDURE — 84132 ASSAY OF SERUM POTASSIUM: CPT

## 2024-03-17 PROCEDURE — 83880 ASSAY OF NATRIURETIC PEPTIDE: CPT

## 2024-03-17 PROCEDURE — 71045 X-RAY EXAM CHEST 1 VIEW: CPT

## 2024-03-17 PROCEDURE — 83735 ASSAY OF MAGNESIUM: CPT

## 2024-03-17 PROCEDURE — 85025 COMPLETE CBC W/AUTO DIFF WBC: CPT

## 2024-03-17 PROCEDURE — 99232 SBSQ HOSP IP/OBS MODERATE 35: CPT | Performed by: INTERNAL MEDICINE

## 2024-03-17 PROCEDURE — 6370000000 HC RX 637 (ALT 250 FOR IP): Performed by: INTERNAL MEDICINE

## 2024-03-17 PROCEDURE — 2700000000 HC OXYGEN THERAPY PER DAY

## 2024-03-17 PROCEDURE — 2580000003 HC RX 258: Performed by: INTERNAL MEDICINE

## 2024-03-17 RX ORDER — INSULIN LISPRO 100 [IU]/ML
INJECTION, SOLUTION INTRAVENOUS; SUBCUTANEOUS
Status: COMPLETED
Start: 2024-03-17 | End: 2024-03-17

## 2024-03-17 RX ORDER — KETOROLAC TROMETHAMINE 15 MG/ML
15 INJECTION, SOLUTION INTRAMUSCULAR; INTRAVENOUS ONCE
Status: COMPLETED | OUTPATIENT
Start: 2024-03-17 | End: 2024-03-17

## 2024-03-17 RX ORDER — INSULIN GLARGINE 100 [IU]/ML
25 INJECTION, SOLUTION SUBCUTANEOUS DAILY
Status: DISCONTINUED | OUTPATIENT
Start: 2024-03-17 | End: 2024-03-17

## 2024-03-17 RX ORDER — INSULIN GLARGINE 100 [IU]/ML
25 INJECTION, SOLUTION SUBCUTANEOUS DAILY
Status: DISCONTINUED | OUTPATIENT
Start: 2024-03-18 | End: 2024-03-18 | Stop reason: HOSPADM

## 2024-03-17 RX ORDER — INSULIN LISPRO 100 [IU]/ML
0-16 INJECTION, SOLUTION INTRAVENOUS; SUBCUTANEOUS
Status: DISCONTINUED | OUTPATIENT
Start: 2024-03-17 | End: 2024-03-18 | Stop reason: HOSPADM

## 2024-03-17 RX ORDER — INSULIN GLARGINE 100 [IU]/ML
25 INJECTION, SOLUTION SUBCUTANEOUS NIGHTLY
Status: DISCONTINUED | OUTPATIENT
Start: 2024-03-17 | End: 2024-03-18 | Stop reason: HOSPADM

## 2024-03-17 RX ORDER — AMOXICILLIN AND CLAVULANATE POTASSIUM 875; 125 MG/1; MG/1
1 TABLET, FILM COATED ORAL EVERY 12 HOURS SCHEDULED
Status: DISCONTINUED | OUTPATIENT
Start: 2024-03-17 | End: 2024-03-18 | Stop reason: HOSPADM

## 2024-03-17 RX ORDER — INSULIN GLARGINE 100 [IU]/ML
25 INJECTION, SOLUTION SUBCUTANEOUS NIGHTLY
Status: DISCONTINUED | OUTPATIENT
Start: 2024-03-17 | End: 2024-03-17

## 2024-03-17 RX ADMIN — TORSEMIDE 50 MG: 100 TABLET ORAL at 16:26

## 2024-03-17 RX ADMIN — OXYCODONE HYDROCHLORIDE 20 MG: 10 TABLET, FILM COATED, EXTENDED RELEASE ORAL at 12:38

## 2024-03-17 RX ADMIN — SULFASALAZINE 500 MG: 500 TABLET ORAL at 09:02

## 2024-03-17 RX ADMIN — LINACLOTIDE 290 MCG: 145 CAPSULE, GELATIN COATED ORAL at 09:03

## 2024-03-17 RX ADMIN — PIPERACILLIN AND TAZOBACTAM 3375 MG: 3; .375 INJECTION, POWDER, LYOPHILIZED, FOR SOLUTION INTRAVENOUS at 01:30

## 2024-03-17 RX ADMIN — AMOXICILLIN AND CLAVULANATE POTASSIUM 1 TABLET: 875; 125 TABLET, FILM COATED ORAL at 09:19

## 2024-03-17 RX ADMIN — INSULIN GLARGINE 25 UNITS: 100 INJECTION, SOLUTION SUBCUTANEOUS at 21:34

## 2024-03-17 RX ADMIN — POTASSIUM CHLORIDE 20 MEQ: 29.8 INJECTION, SOLUTION INTRAVENOUS at 05:53

## 2024-03-17 RX ADMIN — OXYCODONE HYDROCHLORIDE 20 MG: 10 TABLET, FILM COATED, EXTENDED RELEASE ORAL at 00:16

## 2024-03-17 RX ADMIN — BENZONATATE 100 MG: 100 CAPSULE ORAL at 11:12

## 2024-03-17 RX ADMIN — FOLIC ACID 1000 MCG: 1 TABLET ORAL at 09:02

## 2024-03-17 RX ADMIN — HEPARIN SODIUM 5000 UNITS: 5000 INJECTION INTRAVENOUS; SUBCUTANEOUS at 21:35

## 2024-03-17 RX ADMIN — SACUBITRIL AND VALSARTAN 0.5 TABLET: 24; 26 TABLET, FILM COATED ORAL at 09:01

## 2024-03-17 RX ADMIN — BUTALBITAL, ACETAMINOPHEN, AND CAFFEINE 1 TABLET: 50; 325; 40 TABLET ORAL at 11:21

## 2024-03-17 RX ADMIN — METOPROLOL TARTRATE 25 MG: 25 TABLET, FILM COATED ORAL at 21:39

## 2024-03-17 RX ADMIN — OXYCODONE AND ACETAMINOPHEN 1 TABLET: 10; 325 TABLET ORAL at 09:12

## 2024-03-17 RX ADMIN — INSULIN LISPRO 2 UNITS: 100 INJECTION, SOLUTION INTRAVENOUS; SUBCUTANEOUS at 05:23

## 2024-03-17 RX ADMIN — INSULIN LISPRO 4 UNITS: 100 INJECTION, SOLUTION INTRAVENOUS; SUBCUTANEOUS at 00:16

## 2024-03-17 RX ADMIN — SPIRONOLACTONE 50 MG: 25 TABLET ORAL at 21:39

## 2024-03-17 RX ADMIN — CLOPIDOGREL BISULFATE 75 MG: 75 TABLET ORAL at 09:03

## 2024-03-17 RX ADMIN — POTASSIUM CHLORIDE 20 MEQ: 29.8 INJECTION, SOLUTION INTRAVENOUS at 06:54

## 2024-03-17 RX ADMIN — BREXPIPRAZOLE 2 MG: 0.5 TABLET ORAL at 21:52

## 2024-03-17 RX ADMIN — ASPIRIN 81 MG: 81 TABLET, CHEWABLE ORAL at 09:03

## 2024-03-17 RX ADMIN — MELATONIN TAB 3 MG 3 MG: 3 TAB at 21:39

## 2024-03-17 RX ADMIN — ACETAMINOPHEN 650 MG: 325 TABLET ORAL at 20:26

## 2024-03-17 RX ADMIN — SODIUM CHLORIDE, PRESERVATIVE FREE 10 ML: 5 INJECTION INTRAVENOUS at 21:38

## 2024-03-17 RX ADMIN — AMOXICILLIN AND CLAVULANATE POTASSIUM 1 TABLET: 875; 125 TABLET, FILM COATED ORAL at 21:39

## 2024-03-17 RX ADMIN — IPRATROPIUM BROMIDE AND ALBUTEROL SULFATE 1 DOSE: .5; 3 SOLUTION RESPIRATORY (INHALATION) at 07:54

## 2024-03-17 RX ADMIN — TORSEMIDE 100 MG: 100 TABLET ORAL at 09:01

## 2024-03-17 RX ADMIN — INSULIN LISPRO 8 UNITS: 100 INJECTION, SOLUTION INTRAVENOUS; SUBCUTANEOUS at 13:12

## 2024-03-17 RX ADMIN — INSULIN LISPRO 4 UNITS: 100 INJECTION, SOLUTION INTRAVENOUS; SUBCUTANEOUS at 21:35

## 2024-03-17 RX ADMIN — INSULIN LISPRO 8 UNITS: 100 INJECTION, SOLUTION INTRAVENOUS; SUBCUTANEOUS at 16:26

## 2024-03-17 RX ADMIN — Medication 10 ML: at 09:04

## 2024-03-17 RX ADMIN — SODIUM CHLORIDE, PRESERVATIVE FREE 10 ML: 5 INJECTION INTRAVENOUS at 08:26

## 2024-03-17 RX ADMIN — LEFLUNOMIDE 20 MG: 20 TABLET ORAL at 09:19

## 2024-03-17 RX ADMIN — LEVOTHYROXINE SODIUM 150 MCG: 0.15 TABLET ORAL at 06:21

## 2024-03-17 RX ADMIN — HEPARIN SODIUM 5000 UNITS: 5000 INJECTION INTRAVENOUS; SUBCUTANEOUS at 13:11

## 2024-03-17 RX ADMIN — SACUBITRIL AND VALSARTAN 0.5 TABLET: 24; 26 TABLET, FILM COATED ORAL at 21:39

## 2024-03-17 RX ADMIN — SPIRONOLACTONE 50 MG: 25 TABLET ORAL at 09:02

## 2024-03-17 RX ADMIN — KETOROLAC TROMETHAMINE 15 MG: 15 INJECTION, SOLUTION INTRAMUSCULAR; INTRAVENOUS at 15:57

## 2024-03-17 RX ADMIN — METOPROLOL TARTRATE 25 MG: 25 TABLET, FILM COATED ORAL at 09:02

## 2024-03-17 RX ADMIN — INSULIN LISPRO 2 UNITS: 100 INJECTION, SOLUTION INTRAVENOUS; SUBCUTANEOUS at 09:33

## 2024-03-17 RX ADMIN — HEPARIN SODIUM 5000 UNITS: 5000 INJECTION INTRAVENOUS; SUBCUTANEOUS at 05:30

## 2024-03-17 RX ADMIN — SULFASALAZINE 500 MG: 500 TABLET ORAL at 21:39

## 2024-03-17 RX ADMIN — MONTELUKAST SODIUM 10 MG: 10 TABLET, FILM COATED ORAL at 21:39

## 2024-03-17 RX ADMIN — IPRATROPIUM BROMIDE AND ALBUTEROL SULFATE 1 DOSE: .5; 3 SOLUTION RESPIRATORY (INHALATION) at 15:21

## 2024-03-17 RX ADMIN — BUTALBITAL, ACETAMINOPHEN, AND CAFFEINE 1 TABLET: 50; 325; 40 TABLET ORAL at 02:13

## 2024-03-17 RX ADMIN — PANTOPRAZOLE SODIUM 40 MG: 40 INJECTION, POWDER, FOR SOLUTION INTRAVENOUS at 08:26

## 2024-03-17 RX ADMIN — IPRATROPIUM BROMIDE AND ALBUTEROL SULFATE 1 DOSE: .5; 3 SOLUTION RESPIRATORY (INHALATION) at 19:44

## 2024-03-17 RX ADMIN — INSULIN GLARGINE 25 UNITS: 100 INJECTION, SOLUTION SUBCUTANEOUS at 09:33

## 2024-03-17 RX ADMIN — ROSUVASTATIN 20 MG: 20 TABLET, FILM COATED ORAL at 21:39

## 2024-03-17 RX ADMIN — Medication 10 ML: at 21:38

## 2024-03-17 ASSESSMENT — PAIN DESCRIPTION - LOCATION
LOCATION: HEAD
LOCATION: OTHER (COMMENT)
LOCATION: HEAD
LOCATION: OTHER (COMMENT)
LOCATION: HEAD
LOCATION: OTHER (COMMENT);HEAD

## 2024-03-17 ASSESSMENT — PAIN SCALES - WONG BAKER
WONGBAKER_NUMERICALRESPONSE: NO HURT

## 2024-03-17 ASSESSMENT — PAIN DESCRIPTION - ORIENTATION
ORIENTATION: RIGHT;LEFT
ORIENTATION: OTHER (COMMENT)
ORIENTATION: RIGHT;LEFT

## 2024-03-17 ASSESSMENT — PAIN SCALES - GENERAL
PAINLEVEL_OUTOF10: 7
PAINLEVEL_OUTOF10: 0
PAINLEVEL_OUTOF10: 0
PAINLEVEL_OUTOF10: 7
PAINLEVEL_OUTOF10: 0
PAINLEVEL_OUTOF10: 8
PAINLEVEL_OUTOF10: 0
PAINLEVEL_OUTOF10: 5
PAINLEVEL_OUTOF10: 0
PAINLEVEL_OUTOF10: 7
PAINLEVEL_OUTOF10: 0
PAINLEVEL_OUTOF10: 7
PAINLEVEL_OUTOF10: 7

## 2024-03-17 ASSESSMENT — PAIN DESCRIPTION - DESCRIPTORS
DESCRIPTORS: ACHING

## 2024-03-17 NOTE — CONSULTS
MD Pat Johnston MD Samir Brahmbhatt, MD                Office: [phone]                      Fax: [phone]               [employer]                     Nephrology consult received. Full consult report will follow.   Thank you for allowing us to participate in this patient's care. Please do not hesitate to contact us anytime. We will follow along with you.       Vito Al MD  [facility]   [phone] or [address] [employer].   
[facility]  Cardiology Note  [phone]      Chief Complaint   Patient presents with    Respiratory Distress     Pt arrived via [facility] from her home, was found in resp distress in passenger seat of car, had just got home from eye Dr santa.  EMS concerned for allergic reaction, epi given.  Pt sat's 87% on RA, pt arrives to ER unresponsive on non rebreather.         History of Present Illness:  Crow Bonner is a 54 y.o. patient PMHx HFPEF, LUIS non-complaint with CPAP, HTN, DM-II, non-obstructive CAD, breast CA, COPD/asthma who presented to the hospital with respiratory failure    Patient was not wearing home O2 and presented to hospital with low O2 sats, required urgent intubation with aspiration event, followed by fever and PNA.    Patient has been diuresed by primary and nephrology and restarted on home GDMT. Extubated 3/15/24. Normal sats on home O2.    Patient was recently admitted for HFPEF exacerbation requiring 10L diuresis with lasix gtt. Discharge weight documented as 90kg.   Past Medical History:   has a past medical history of Acute CVA (cerebrovascular accident) ([facility]), Anxiety, Anxiety and depression, Arthritis, Asthma, CAD (coronary artery disease), Cancer ([facility]), Cerebral artery occlusion with cerebral infarction ([facility]), CHF (congestive heart failure) ([facility]), Chronic kidney disease, Chronic pain, Constipation, COPD (chronic obstructive pulmonary disease) ([facility]), Depression, Diabetes mellitus ([facility]), Diabetic polyneuropathy associated with type 2 diabetes mellitus ([facility]), Dysthymia, ESBL (extended spectrum beta-lactamase) producing bacteria infection, Fibromyalgia, Gastric ulcer, unspecified as acute or chronic, without mention of hemorrhage, perforation, or obstruction, GERD (gastroesophageal reflux disease), Gout, HIGH CHOLESTEROL, Hypertension, Hypothyroidism, Pneumonia, Pyelonephritis, Severe persistent asthma without complication, Thyroid disease, and TIA (transient ischemic 
 There is  left lower abdominal wall fat stranding/edema (series 2, image 22).     IMPRESSION:  1. Endotracheal tube tip within the right mainstem bronchus.  Repositioning  is recommended.  2. Nodular consolidations noted throughout both lungs, most notably within  the right upper lobe, concerning for multifocal pneumonia.  Follow-up to  resolution.  3. Scattered foci of air and fluid noted within the right shoulder joint.  These findings may be secondary to infection or recent joint injection.  No  underlying osseous abnormality is identified.  Orthopedic follow-up is  recommended.  4. Dilated gallbladder with small layering stones or sludge.  If there is  concern for acute cholecystitis, follow-up right upper quadrant ultrasound  may be obtained for further evaluation.  5. Left lower abdominal wall superficial fat stranding/edema, possibly  injection related.  Correlate to exclude infection in this region.  6. Increased stool noted throughout the colon.    Problem List:   Acute hypoxic respiratory failure  Multifocal pneumonia/aspiration  Lactic acidosis  Diastolic CHF, chronic  Altered mental status  MARIA D on CKD.  Assessment/Plan:     Acute hypoxic respiratory failure.  Patient noted to have profound hypoxia on presentation requiring urgent intubation.  Could be related to a viral illness, given severe febrile illness or aspiration.  Patient has multifocal bilateral infiltrates which is most likely suggestive of pneumonia/aspiration.  Start IV Zosyn.  Retract ET tube by 2 cm.  ABG acceptable postintubation.    Severe sepsis/lactic acidosis, IVF + Zosyn.  Most likely related to multifocal pneumonia.  COVID-19/influenza swab negative, send respiratory molecular panel.    History of severe diastolic CHF and anasarca.  Preserved EF.  Cautious use of IV fluids.  Currently we should hold off on diuretics, given patient's severe sepsis.    Altered mental status, possibly related to combination of sepsis + profound 
+------------------------+----------+---------------+----------+ !GSV Calf                !Yes       !Yes            !None      ! +------------------------+----------+---------------+----------+ !SSV                     !Yes       !Yes            !None      ! +------------------------+----------+---------------+----------+ Right Doppler Measurements +------------------------+---------+------+------------+ !Location                !Signal   !Reflux!Reflux (sec)! +------------------------+---------+------+------------+ !Sapheno Femoral Junction!Pulsatile!No    !            ! +------------------------+---------+------+------------+ !Common Femoral          !Pulsatile!No    !            ! +------------------------+---------+------+------------+ !Femoral                 !Phasic   !      !            ! +------------------------+---------+------+------------+ !Deep Femoral            !Phasic   !No    !            ! +------------------------+---------+------+------------+ !Popliteal               !Pulsatile!No    !            ! +------------------------+---------+------+------------+ Left Lower Extremities DVT Study Measurements Left 2D Measurements +------------------------+----------+---------------+----------+ !Location                !Visualized!Compressibility!Thrombosis! +------------------------+----------+---------------+----------+ !Sapheno Femoral Junction!Yes       !Yes            !None      ! +------------------------+----------+---------------+----------+ !Common Femoral          !Yes       !Yes            !None      ! +------------------------+----------+---------------+----------+ Left Doppler Measurements +--------------+---------+------+------------+ !Location      !Signal   !Reflux!Reflux (sec)! +--------------+---------+------+------------+ !Common Femoral!Pulsatile!No    !            ! +--------------+---------+------+------------+    XR CHEST PORTABLE    Result Date: 3/6/2024  EXAMINATION: ONE XRAY VIEW

## 2024-03-17 NOTE — PLAN OF CARE
Problem: Safety - Adult  Goal: Free from fall injury  Outcome: Progressing     Problem: Pain  Goal: Verbalizes/displays adequate comfort level or baseline comfort level  Outcome: Progressing    Problem: Skin/Tissue Integrity  Goal: Absence of new skin breakdown  Description: 1.  Monitor for areas of redness and/or skin breakdown  2.  Assess vascular access sites hourly  3.  Every 4-6 hours minimum:  Change oxygen saturation probe site  4.  Every 4-6 hours:  If on nasal continuous positive airway pressure, respiratory therapy assess nares and determine need for appliance change or resting period.  Outcome: Progressing     Problem: ABCDS Injury Assessment  Goal: Absence of physical injury  Outcome: Progressing     Problem: Chronic Conditions and Co-morbidities  Goal: Patient's chronic conditions and co-morbidity symptoms are monitored and maintained or improved  Outcome: Progressing     Problem: Nutrition Deficit:  Goal: Optimize nutritional status  Outcome: Progressing     Problem: Discharge Planning  Goal: Discharge to home or other facility with appropriate resources  Outcome: Progressing  Flowsheets (Taken 3/16/2024 2000)  Discharge to home or other facility with appropriate resources:   Identify barriers to discharge with patient and caregiver   Arrange for needed discharge resources and transportation as appropriate   Identify discharge learning needs (meds, wound care, etc)   Arrange for interpreters to assist at discharge as needed   Refer to discharge planning if patient needs post-hospital services based on physician order or complex needs related to functional status, cognitive ability or social support system

## 2024-03-18 ENCOUNTER — TELEPHONE (OUTPATIENT)
Dept: CARDIOLOGY CLINIC | Age: 54
End: 2024-03-18

## 2024-03-18 VITALS
SYSTOLIC BLOOD PRESSURE: 158 MMHG | TEMPERATURE: 97 F | WEIGHT: 195.33 LBS | HEIGHT: 64 IN | DIASTOLIC BLOOD PRESSURE: 80 MMHG | OXYGEN SATURATION: 97 % | BODY MASS INDEX: 33.35 KG/M2 | RESPIRATION RATE: 20 BRPM | HEART RATE: 85 BPM

## 2024-03-18 LAB
ALBUMIN SERPL-MCNC: 3.6 G/DL (ref 3.4–5)
ANION GAP SERPL CALCULATED.3IONS-SCNC: 11 MMOL/L (ref 3–16)
BASOPHILS # BLD: 0.1 K/UL (ref 0–0.2)
BASOPHILS NFR BLD: 0.7 %
BUN SERPL-MCNC: 18 MG/DL (ref 7–20)
CALCIUM SERPL-MCNC: 9.8 MG/DL (ref 8.3–10.6)
CHLORIDE SERPL-SCNC: 95 MMOL/L (ref 99–110)
CO2 SERPL-SCNC: 35 MMOL/L (ref 21–32)
CREAT SERPL-MCNC: 0.9 MG/DL (ref 0.6–1.1)
DEPRECATED RDW RBC AUTO: 14 % (ref 12.4–15.4)
EOSINOPHIL # BLD: 0.2 K/UL (ref 0–0.6)
EOSINOPHIL NFR BLD: 2.2 %
GFR SERPLBLD CREATININE-BSD FMLA CKD-EPI: >60 ML/MIN/{1.73_M2}
GLUCOSE BLD-MCNC: 212 MG/DL (ref 70–99)
GLUCOSE BLD-MCNC: 225 MG/DL (ref 70–99)
GLUCOSE SERPL-MCNC: 225 MG/DL (ref 70–99)
HCT VFR BLD AUTO: 36.9 % (ref 36–48)
HGB BLD-MCNC: 12.1 G/DL (ref 12–16)
LYMPHOCYTES # BLD: 1.3 K/UL (ref 1–5.1)
LYMPHOCYTES NFR BLD: 15.2 %
MAGNESIUM SERPL-MCNC: 1.7 MG/DL (ref 1.8–2.4)
MCH RBC QN AUTO: 28.5 PG (ref 26–34)
MCHC RBC AUTO-ENTMCNC: 32.9 G/DL (ref 31–36)
MCV RBC AUTO: 86.5 FL (ref 80–100)
MONOCYTES # BLD: 0.7 K/UL (ref 0–1.3)
MONOCYTES NFR BLD: 7.7 %
NEUTROPHILS # BLD: 6.5 K/UL (ref 1.7–7.7)
NEUTROPHILS NFR BLD: 74.2 %
PERFORMED ON: ABNORMAL
PERFORMED ON: ABNORMAL
PHOSPHATE SERPL-MCNC: 3 MG/DL (ref 2.5–4.9)
PLATELET # BLD AUTO: 223 K/UL (ref 135–450)
PMV BLD AUTO: 9 FL (ref 5–10.5)
POTASSIUM SERPL-SCNC: 3.4 MMOL/L (ref 3.5–5.1)
RBC # BLD AUTO: 4.26 M/UL (ref 4–5.2)
SODIUM SERPL-SCNC: 141 MMOL/L (ref 136–145)
WBC # BLD AUTO: 8.8 K/UL (ref 4–11)

## 2024-03-18 PROCEDURE — 2580000003 HC RX 258: Performed by: INTERNAL MEDICINE

## 2024-03-18 PROCEDURE — C9113 INJ PANTOPRAZOLE SODIUM, VIA: HCPCS | Performed by: INTERNAL MEDICINE

## 2024-03-18 PROCEDURE — 94640 AIRWAY INHALATION TREATMENT: CPT

## 2024-03-18 PROCEDURE — 2580000003 HC RX 258: Performed by: STUDENT IN AN ORGANIZED HEALTH CARE EDUCATION/TRAINING PROGRAM

## 2024-03-18 PROCEDURE — 6370000000 HC RX 637 (ALT 250 FOR IP): Performed by: INTERNAL MEDICINE

## 2024-03-18 PROCEDURE — 80069 RENAL FUNCTION PANEL: CPT

## 2024-03-18 PROCEDURE — 6360000002 HC RX W HCPCS: Performed by: INTERNAL MEDICINE

## 2024-03-18 PROCEDURE — 92526 ORAL FUNCTION THERAPY: CPT

## 2024-03-18 PROCEDURE — 6370000000 HC RX 637 (ALT 250 FOR IP): Performed by: STUDENT IN AN ORGANIZED HEALTH CARE EDUCATION/TRAINING PROGRAM

## 2024-03-18 PROCEDURE — 97116 GAIT TRAINING THERAPY: CPT

## 2024-03-18 PROCEDURE — 6370000000 HC RX 637 (ALT 250 FOR IP): Performed by: NURSE PRACTITIONER

## 2024-03-18 PROCEDURE — 97165 OT EVAL LOW COMPLEX 30 MIN: CPT

## 2024-03-18 PROCEDURE — 85025 COMPLETE CBC W/AUTO DIFF WBC: CPT

## 2024-03-18 PROCEDURE — 97161 PT EVAL LOW COMPLEX 20 MIN: CPT

## 2024-03-18 PROCEDURE — 83735 ASSAY OF MAGNESIUM: CPT

## 2024-03-18 PROCEDURE — 97535 SELF CARE MNGMENT TRAINING: CPT

## 2024-03-18 PROCEDURE — 94761 N-INVAS EAR/PLS OXIMETRY MLT: CPT

## 2024-03-18 RX ORDER — POTASSIUM CHLORIDE 20 MEQ/1
40 TABLET, EXTENDED RELEASE ORAL ONCE
Status: COMPLETED | OUTPATIENT
Start: 2024-03-18 | End: 2024-03-18

## 2024-03-18 RX ORDER — TORSEMIDE 100 MG/1
100 TABLET ORAL DAILY
Qty: 30 TABLET | Refills: 3 | Status: ON HOLD | OUTPATIENT
Start: 2024-03-18

## 2024-03-18 RX ORDER — GUAIFENESIN 600 MG/1
600 TABLET, EXTENDED RELEASE ORAL 2 TIMES DAILY
Qty: 60 TABLET | Refills: 0 | Status: ON HOLD | OUTPATIENT
Start: 2024-03-18 | End: 2024-04-17

## 2024-03-18 RX ORDER — AMOXICILLIN AND CLAVULANATE POTASSIUM 875; 125 MG/1; MG/1
1 TABLET, FILM COATED ORAL EVERY 12 HOURS SCHEDULED
Qty: 1 TABLET | Refills: 0 | Status: SHIPPED | OUTPATIENT
Start: 2024-03-18 | End: 2024-03-19

## 2024-03-18 RX ORDER — GUAIFENESIN 600 MG/1
600 TABLET, EXTENDED RELEASE ORAL 2 TIMES DAILY
Status: DISCONTINUED | OUTPATIENT
Start: 2024-03-18 | End: 2024-03-18 | Stop reason: HOSPADM

## 2024-03-18 RX ADMIN — CLOPIDOGREL BISULFATE 75 MG: 75 TABLET ORAL at 09:33

## 2024-03-18 RX ADMIN — SPIRONOLACTONE 50 MG: 25 TABLET ORAL at 09:33

## 2024-03-18 RX ADMIN — Medication 10 ML: at 09:35

## 2024-03-18 RX ADMIN — ASPIRIN 81 MG: 81 TABLET, CHEWABLE ORAL at 09:34

## 2024-03-18 RX ADMIN — LEVOTHYROXINE SODIUM 150 MCG: 0.15 TABLET ORAL at 06:19

## 2024-03-18 RX ADMIN — LINACLOTIDE 290 MCG: 145 CAPSULE, GELATIN COATED ORAL at 06:19

## 2024-03-18 RX ADMIN — GUAIFENESIN 600 MG: 600 TABLET, EXTENDED RELEASE ORAL at 12:00

## 2024-03-18 RX ADMIN — LEFLUNOMIDE 20 MG: 20 TABLET ORAL at 10:08

## 2024-03-18 RX ADMIN — METOPROLOL TARTRATE 25 MG: 25 TABLET, FILM COATED ORAL at 09:34

## 2024-03-18 RX ADMIN — OXYCODONE AND ACETAMINOPHEN 1 TABLET: 10; 325 TABLET ORAL at 09:34

## 2024-03-18 RX ADMIN — BUTALBITAL, ACETAMINOPHEN, AND CAFFEINE 1 TABLET: 50; 325; 40 TABLET ORAL at 03:26

## 2024-03-18 RX ADMIN — POTASSIUM CHLORIDE 40 MEQ: 1500 TABLET, EXTENDED RELEASE ORAL at 09:41

## 2024-03-18 RX ADMIN — OXYCODONE HYDROCHLORIDE 20 MG: 10 TABLET, FILM COATED, EXTENDED RELEASE ORAL at 12:00

## 2024-03-18 RX ADMIN — INSULIN LISPRO 4 UNITS: 100 INJECTION, SOLUTION INTRAVENOUS; SUBCUTANEOUS at 12:00

## 2024-03-18 RX ADMIN — SODIUM CHLORIDE, PRESERVATIVE FREE 10 ML: 5 INJECTION INTRAVENOUS at 09:35

## 2024-03-18 RX ADMIN — INSULIN LISPRO 4 UNITS: 100 INJECTION, SOLUTION INTRAVENOUS; SUBCUTANEOUS at 06:18

## 2024-03-18 RX ADMIN — TORSEMIDE 100 MG: 100 TABLET ORAL at 09:33

## 2024-03-18 RX ADMIN — OXYCODONE HYDROCHLORIDE 20 MG: 10 TABLET, FILM COATED, EXTENDED RELEASE ORAL at 00:07

## 2024-03-18 RX ADMIN — SACUBITRIL AND VALSARTAN 0.5 TABLET: 24; 26 TABLET, FILM COATED ORAL at 09:34

## 2024-03-18 RX ADMIN — INSULIN GLARGINE 25 UNITS: 100 INJECTION, SOLUTION SUBCUTANEOUS at 09:32

## 2024-03-18 RX ADMIN — FOLIC ACID 1000 MCG: 1 TABLET ORAL at 09:33

## 2024-03-18 RX ADMIN — AMOXICILLIN AND CLAVULANATE POTASSIUM 1 TABLET: 875; 125 TABLET, FILM COATED ORAL at 09:33

## 2024-03-18 RX ADMIN — BENZONATATE 100 MG: 100 CAPSULE ORAL at 00:07

## 2024-03-18 RX ADMIN — IPRATROPIUM BROMIDE AND ALBUTEROL SULFATE 1 DOSE: .5; 3 SOLUTION RESPIRATORY (INHALATION) at 13:16

## 2024-03-18 RX ADMIN — PANTOPRAZOLE SODIUM 40 MG: 40 INJECTION, POWDER, FOR SOLUTION INTRAVENOUS at 09:34

## 2024-03-18 RX ADMIN — SULFASALAZINE 500 MG: 500 TABLET ORAL at 09:34

## 2024-03-18 RX ADMIN — HEPARIN SODIUM 5000 UNITS: 5000 INJECTION INTRAVENOUS; SUBCUTANEOUS at 06:19

## 2024-03-18 ASSESSMENT — PAIN DESCRIPTION - DESCRIPTORS
DESCRIPTORS: ACHING

## 2024-03-18 ASSESSMENT — PAIN SCALES - GENERAL
PAINLEVEL_OUTOF10: 2
PAINLEVEL_OUTOF10: 8
PAINLEVEL_OUTOF10: 4
PAINLEVEL_OUTOF10: 0
PAINLEVEL_OUTOF10: 8

## 2024-03-18 ASSESSMENT — PAIN DESCRIPTION - LOCATION
LOCATION: HEAD
LOCATION: HEAD

## 2024-03-18 ASSESSMENT — PAIN DESCRIPTION - PAIN TYPE: TYPE: CHRONIC PAIN

## 2024-03-18 ASSESSMENT — PAIN DESCRIPTION - ONSET: ONSET: ON-GOING

## 2024-03-18 ASSESSMENT — PAIN DESCRIPTION - FREQUENCY: FREQUENCY: CONTINUOUS

## 2024-03-18 NOTE — DISCHARGE INSTRUCTIONS
Oxygen Therapy for Heart Failure: Care Instructions  Overview  When you have heart failure, your heart does not pump as well as it should. So it does not send enough oxygen-rich blood to the rest of your body. Oxygen therapy increases the amount of oxygen sent to your body's tissues. This helps reduce your heart's workload. It can help you breathe easier and let you do more.  Follow-up care is a key part of your treatment and safety. Be sure to make and go to all appointments, and call your doctor if you are having problems. It's also a good idea to know your test results and keep a list of the medicines you take.  How can you care for yourself at home?  To help yourself  Using oxygen may dry out your nose or lips. Use water-based lubricants on your lips or nostrils. Do not use an oil-based product like petroleum jelly. They may cause skin burns.  If you use a nasal cannula, the tubing may rub under your nostrils and around your ears. To keep your skin from getting sore, tuck some gauze under the tubing. Use a water-based lotion on rubbed areas.  Do not use alcohol or take drugs that relax you, because they will slow your breathing rate.  Keep track of how much oxygen is in the tank, and reorder before it runs out. If a holiday is coming up or you expect bad weather, order in advance or make your regular order larger.  You may need extra oxygen when you travel to high altitudes or travel by plane. Ask your doctor about this.  If you are getting oxygen directly to your windpipe through an opening in your neck, your doctor will teach you how to care for the equipment.  To make sure oxygen is flowing  Check the flow by holding your mask or cannula up to your ear and listening for the \"hiss\" of airflow.  If you have a nasal cannula, dip the prongs in a glass of water. If you see bubbles, oxygen is coming through.  Check your pressure gauge or contents indicator.  If you use an oxygen concentrator, make sure it is

## 2024-03-18 NOTE — PROGRESS NOTES
Hospitalist Progress Note    Name:  Crow Bonner    /Age/Sex: 1970  (54 y.o. female)  MRN & CSN:  [id number] & [id number]    PCP: Mariana Simental DO    Date of Admission: 3/13/2024    Patient Status:  Inpatient     Chief Complaint:   Chief Complaint   Patient presents with    Respiratory Distress     Pt arrived via [facility] from her home, was found in resp distress in passenger seat of car, had just got home from eye Dr appimer.  EMS concerned for allergic reaction, epi given.  Pt sat's 87% on RA, pt arrives to ER unresponsive on non rebreather.        Hospital Course:   Admitted for shortness of breath. Patient was recently discharged from the hospital for acute diastolic heart failure exacerbation.    Prior to admission, patient went to eye doctor and did not wear oxygen. She is supposed to be on 2 L at home. She had her eyes dilated, but then became lethargic and sent to the ED. Patient was found to be septic and imaging was indicative of pneumonia on admission.    It was determined patient should be intubated in the ED. CCM consulted.    Extubated 3/15.    Back on home oxygen and saturating well. Encouraged to continue to wear oxygen supplementation at home.    Subjective:  Today is:  Hospital Day: 5.  Patient seen and examined in ICU-3902/3902-.     In bed. Very fatigued, but improving. On her home O2 dose.      Medications:  Reviewed    Infusion Medications    sodium chloride      sodium chloride      dextrose       Scheduled Medications    insulin glargine  25 Units SubCUTAneous Daily    ipratropium 0.5 mg-albuterol 2.5 mg  1 Dose Inhalation TID RT    torsemide  50 mg Oral QPM    spironolactone  50 mg Oral BID    linaclotide  290 mcg Oral QAM AC    torsemide  100 mg Oral Daily    oxyCODONE  20 mg Oral Q12H    sacubitril-valsartan  0.5 tablet Oral BID    insulin lispro  0-8 Units SubCUTAneous Q4H    brexpiprazole  2 mg Oral Nightly    [Held by provider] DULoxetine  60 mg Oral BID    folic 
      Hospitalist Progress Note    Name:  Crow Bonner    /Age/Sex: 1970  (54 y.o. female)  MRN & CSN:  [id number] & [id number]    PCP: Mariana Simental DO    Date of Admission: 3/13/2024    Patient Status:  Inpatient     Chief Complaint:   Chief Complaint   Patient presents with    Respiratory Distress     Pt arrived via [facility] from her home, was found in resp distress in passenger seat of car, had just got home from eye Dr appimer.  EMS concerned for allergic reaction, epi given.  Pt sat's 87% on RA, pt arrives to ER unresponsive on non rebreather.        Hospital Course:   Admitted for shortness of breath. Patient was recently discharged from the hospital for acute diastolic heart failure exacerbation.    Prior to admission, patient went to eye doctor and did not wear oxygen. She is supposed to be on 2 L at home. She had her eyes dilated, but then became lethargic and sent to the ED. Patient was found to be septic and imaging was indicative of pneumonia on admission.    It was determined patient should be intubated in the ED. CCM consulted.    Extubated 3/15.    Subjective:  Today is:  Hospital Day: 4.  Patient seen and examined in ICU-3902/3902-01.     In bed. Fatigued. Denies pain.      Medications:  Reviewed    Infusion Medications    sodium chloride      sodium chloride      dextrose       Scheduled Medications    ipratropium 0.5 mg-albuterol 2.5 mg  1 Dose Inhalation TID RT    sodium phosphate 15.06 mmol in sodium chloride 0.9 % 250 mL IVPB  0.16 mmol/kg IntraVENous Once    insulin glargine  15 Units SubCUTAneous Daily    sacubitril-valsartan  0.5 tablet Oral BID    insulin lispro  0-8 Units SubCUTAneous Q4H    brexpiprazole  2 mg Oral Nightly    [Held by provider] DULoxetine  60 mg Oral BID    folic acid  1,000 mcg Oral Daily    leflunomide  20 mg Oral Daily    montelukast  10 mg Oral Nightly    [Held by provider] ranolazine  500 mg Oral BID    sulfaSALAzine  500 mg Oral BID    aspirin  81 mg 
    Clinical Pharmacy Note    Pharmacy can not supply brexpiprazole.   I have notified the nurse and requested the medication be brought from home.     The patient/nurse is unsure if the medication will be able to be brought in.      Please follow to ensure that medication therapy needs are met.    Thanks,  Gera Vergara, PharmD    
    Facility/Department: [facility] ICU  Speech Language Pathology  DYSPHAGIA BEDSIDE SWALLOW EVALUATION     Patient: Crow Bonner   : 1970   MRN: [id number]      Evaluation Date: 3/15/2024   Admitting Diagnosis: Biliary sludge [K83.8]  Elevated troponin [R79.89]  MARIA D (acute kidney injury) (HCC) [N17.9]  Acute encephalopathy [G93.40]  Increased ammonia level [R79.89]  Acute respiratory failure with hypoxia and hypercapnia (HCC) [J96.01, J96.02]  Aspiration pneumonia of both lower lobes due to gastric secretions (HCC) [J69.0]  Acute on chronic hypoxic respiratory failure (HCC) [J96.21]  Pain: Did not state                                                       H&P: Crow Bonner is a 53 y.o. female who who was admitted to the hospital earlier last week and discharged for acute diastolic heart failure exacerbation .  Today patient went to eye doctor did not wear oxygen supposed to be on 2 L at home had her eyes dilated but was lethargic thus sent to the emergency department per patient  who was in the room patient did not have any fevers chills sweats cough at home.  Patient was found to be septic with pneumonia and was intubated in the ED     Imaging:  Chest X-ray:  3/13/24  IMPRESSION:  Improved aeration of the lungs    Head CT:  3/13/24  IMPRESSION:  No acute intracranial abnormality.    Prior Modified Barium Swallow Study:   Modified Barium Swallow evaluation completed on 2011.  Results indicate moderate oropharyngeal dysphagia, with intermittent Silent laryngeal penetration noted with thins. Penetration appears to be non-building, with no aspiration directly viewed. However, consistency of laryngeal penetration is indicative of delayed sensory/motor response, and reduced airway protection during the swallow. Therefore, precautions should be followed to minimize aspiration risk with thins.     Pt was also noted with reduced oropharyngeal sensation for timely palatal retraction for bolus transfer 
    MD Pat Johnston MD Samir Brahmbhatt, MD                  Office: [phone]                      Fax: [phone]             [facility]                   NEPHROLOGY INPATIENT PROGRESS NOTE:     PATIENT NAME: Crow Bonner  : 1970  MRN: [id number]        RECOMMENDATIONS:   Check spot UPCR for nephrotic syndrome rule out    Good response with Lasix 60 mg IV x 1 on 3-14  Changed to Lasix 40 IV twice daily     Follow-up repeat echo and cardiology     Pressor support needed with Levophed    No IV fluids    Hold home torsemide, Aldactone, Jardiance, Entresto,  Hold antidiabetic PO meds  ISS for BG control     Okay to use as needed electrolyte protocols  jamison insertion needed in a critically ill patient for strict output monitoring  no need for dialysis,     at higher risk for decompensation, needing closer monitoring.    D/C plan from renal stand point: Unclear      Thank you for allowing me to participate in this patient's care. Please do not hesitate to contact me anytime. We will follow along with you.       Vito Al MD,  [facility]  Office: [phone] or Via [employer]  Fax: [phone]        IMPRESSION:       Admitted on:  3/13/2024  3:57 PM   For:    Biliary sludge [K83.8]  Elevated troponin [R79.89]  MARIA D (acute kidney injury) (HCC) [N17.9]  Acute encephalopathy [G93.40]  Increased ammonia level [R79.89]  Acute respiratory failure with hypoxia and hypercapnia (HCC) [J96.01, J96.02]  Aspiration pneumonia of both lower lobes due to gastric secretions (HCC) [J69.0]  Acute on chronic hypoxic respiratory failure (HCC) [J96.21]  per primary team- Needing intubation,      MARIA D (on proteinuric CKD: stage 3A):   - BL Scr-low-dose recently 1.2 as of 3-9-2024  -> 2.2 on admission  -> now Estimated Creatinine Clearance: 51 mL/min (A) (based on SCr of 1.4 mg/dL (H)).    - Etiology of MARIA D -presumably prerenal, but at high risk of ATN due to 
  [facility] - Inpatient Rehabilitation Department   Phone: [phone]    Physical Therapy    [x] Initial Evaluation            [] Daily Treatment Note         [] Discharge Summary      Patient: Crow Bonner   : 1970   MRN: [id number]   Date of Service:  3/18/2024  Admitting Diagnosis: Acute on chronic hypoxic respiratory failure (HCC)  Current Admission Summary: Crow Bonner is a 53 y.o. female who presents with altered mental status, respiratory distress.  EMS was called to her house, found her in respiratory distress, unresponsive in the passenger seat.  She had just came from an eye appointment with a doctor and they were concerned about allergic reaction so they gave an IV and transported patient.  She has decreased GCS, not able to participate in history.  Medical record review so she is was admitted for CHF with preserved ejection fraction and discharged 4 days ago.     Family arrived, reported she was off her home oxygen for hours today while visiting the eye doctor.  Medical record reviewed, also on oxycodone and Valium at home.  EMS reported no Narcan given.  Past Medical History:  has a past medical history of Acute CVA (cerebrovascular accident) (HCC), Anxiety, Anxiety and depression, Arthritis, Asthma, CAD (coronary artery disease), Cancer (HCC), Cerebral artery occlusion with cerebral infarction (HCC), CHF (congestive heart failure) (HCC), Chronic kidney disease, Chronic pain, Constipation, COPD (chronic obstructive pulmonary disease) (HCC), Depression, Diabetes mellitus (HCC), Diabetic polyneuropathy associated with type 2 diabetes mellitus (HCC), Dysthymia, ESBL (extended spectrum beta-lactamase) producing bacteria infection, Fibromyalgia, Gastric ulcer, unspecified as acute or chronic, without mention of hemorrhage, perforation, or obstruction, GERD (gastroesophageal reflux disease), Gout, HIGH CHOLESTEROL, Hypertension, Hypothyroidism, Pneumonia, Pyelonephritis, Severe 
  [facility] - Inpatient Rehabilitation Department   Phone: [phone]    Occupational Therapy    [x] Initial Evaluation            [] Daily Treatment Note         [] Discharge Summary      Patient: Crow Bonner   : 1970   MRN: [id number]   Date of Service:  3/18/2024    Admitting Diagnosis:  Acute on chronic hypoxic respiratory failure (HCC)  Current Admission Summary: Crow Bonner is a 53 y.o. female who who was admitted to the hospital earlier last week and discharged for acute diastolic heart failure exacerbation .  Today patient went to eye doctor did not wear oxygen supposed to be on 2 L at home had her eyes dilated but was lethargic thus sent to the emergency department per patient  who was in the room patient did not have any fevers chills sweats cough at home.  Patient was found to be septic with pneumonia and was intubated in the ED.    Past Medical History:  has a past medical history of Acute CVA (cerebrovascular accident) (HCC), Anxiety, Anxiety and depression, Arthritis, Asthma, CAD (coronary artery disease), Cancer (HCC), Cerebral artery occlusion with cerebral infarction (HCC), CHF (congestive heart failure) (HCC), Chronic kidney disease, Chronic pain, Constipation, COPD (chronic obstructive pulmonary disease) (HCC), Depression, Diabetes mellitus (HCC), Diabetic polyneuropathy associated with type 2 diabetes mellitus (HCC), Dysthymia, ESBL (extended spectrum beta-lactamase) producing bacteria infection, Fibromyalgia, Gastric ulcer, unspecified as acute or chronic, without mention of hemorrhage, perforation, or obstruction, GERD (gastroesophageal reflux disease), Gout, HIGH CHOLESTEROL, Hypertension, Hypothyroidism, Pneumonia, Pyelonephritis, Severe persistent asthma without complication, Thyroid disease, and TIA (transient ischemic attack).  Past Surgical History:  has a past surgical history that includes Breast surgery; Hysterectomy (); Carpal tunnel release; 
CLINICAL PHARMACY NOTE: MEDS TO BEDS    Total # of Prescriptions Filled: 3   The following medications were delivered to the patient:  TORSEMIDE 100MG TABS  AMOXICILLIN - POTCLAVUL 875 - 125 TABS  MUCUS RELIEF 600MG TB12    Additional Documentation: Patient  picked up=signed  María Baez Pharmacy Tech  
Ett withdrawn by 2cm to 22cm @lip per Pulmonology. Patient tolerated well, will continue to monitor  
Facility/Department: [facility] ICU  Speech Language Pathology   Dysphagia Treatment Note    Patient: Crow Bonner   : 1970   MRN: [id number]      Evaluation Date: 3/18/2024      Admitting Dx: Biliary sludge [K83.8]  Elevated troponin [R79.89]  MARIA D (acute kidney injury) (HCC) [N17.9]  Acute encephalopathy [G93.40]  Increased ammonia level [R79.89]  Acute respiratory failure with hypoxia and hypercapnia (HCC) [J96.01, J96.02]  Aspiration pneumonia of both lower lobes due to gastric secretions (HCC) [J69.0]  Acute on chronic hypoxic respiratory failure (HCC) [J96.21]  Treatment Diagnosis: Oropharyngeal Dysphagia   Pain: Denies                                              Diet and Treatment Recommendations 3/18/2024:  Diet Solids Recommendation:  Dysphagia III Soft and bite sized  Liquid Consistency Recommendation:  Thin liquids, No straws  Recommended form of Meds: Meds in puree  or Meds crushed as able in puree         Compensatory strategies:   Upright as possible with all PO intake , Small bites/sips , Eat/feed slowly, Remain upright 30-45 min     Assessment of Texture Tolerance:  Diet level prior to treatment: Dysphagia III Soft and bite sized , Mildly (nectar) thick liquids   Tolerance of Current Diet Level:RN reported pt appears to be tolerating current diet level  Poor oral intake     Impressions: Pt was positioned Upright in bed , awake and alert. Currently on  2L O2 via nasal cannula . Trials of thin liquids and soft solids were provided to assess swallow function. Pt was able to self feed. Weak vocal quality is noted, pt reported her voice is not at baseline. Oral mechanism exam; adequate ROM/coordination. Pt demonstrated prolonged/disorganized mastication, delayed oral clearing, suspected pharyngeal pooling and suspected delayed swallow initiation. Prior to and during PO trials episodic cough was noted, did appear PO texture specific. Overall pt appeared to tolerate soft solids and thin liquids given 
Headache all day unrelieved by Percocet, Fioricet, and Oxycodone. Headache finally relieved with Toradol. Spouse at bedside, pt. In prayer now on the phone. Bed alarm intact and call light in reach.   
Last bag of K+ replacement infusing at this time. Orders placed for lab draw 1 hour post transfusion per protocol to recheck. Labs to recheck at 2000. Order placed. Will notify oncoming shift of need to collect.   
MD gave VO to extubate. Pt awake, alert and following commands. Suctioned. Restraints removed. Extubated with RT and Nursing to 3 lpm nc. Pt tolerating. Will continue to monitor.   
Nutrition Note    RECOMMENDATIONS  PO Diet: Diet per SLP. Would likely benefit from 4 carb choice modifier  ONS: Ordered Magic Cup BID for trial    NUTRITION ASSESSMENT   Pt triggered for follow-up. Extubated 3/15 and started on dysphagia soft and bite sized diet with mildly thick liquids per SLP. Minimal documented meal intakes of 26-75% with RN note of poor po intake at breakfast yesterday, ate part of banana and applesauce. Pt appeared fatigued and observed breakfast untouched upon visitng this morning. RD will order ONS to offer additional nutrition and continue to monitor for adequate po intake.     Nutrition Related Findings: K+ 3.4, Mg 1.70. POCG 212. LBM 3/17. Edema: +2 non-pitting generalized, LUE; +1 pitting RUE, BLE.  Wounds: None  Nutrition Education:  Education not indicated   Nutrition Goals: PO intake 50% or greater, by next RD assessment     MALNUTRITION ASSESSMENT   Malnutrition Status: No malnutrition    NUTRITION DIAGNOSIS   Inadequate oral intake related to inadequate protein-energy intake as evidenced by intake 26-50%, intake 51-75%    CURRENT NUTRITION THERAPIES  ADULT DIET; Dysphagia - Soft and Bite Sized; Mildly Thick (Nectar); No Pork, No Drinking Straws     PO Intake: Unable to assess   PO Supplement Intake:None Ordered    ANTHROPOMETRICS  Current Height: 162.6 cm (5' 4\")  Current Weight - Scale: 88.6 kg (195 lb 5.2 oz)    Admission weight: 89.8 kg (198 lb)  Ideal Body Weight (IBW): 120 lbs  (55 kg)        BMI: 33.5    COMPARATIVE STANDARDS  Total Energy Requirements (kcals/day): 3744-4357     Protein (g):         Fluid (mL/day):  9132-7700    The patient will be monitored per nutrition standards of care. Consult dietitian if additional nutrition interventions are needed prior to RD reassessment.     Lisa Trevizo, MS, RD, LD    Contact: [phone]     
Nutrition Note    RECOMMENDATIONS  PO Diet: NPO  ONS: NPO  Nutrition Support:   Tube feeds to start today per Dr. Zamora.  Recommend Vital AF 1.2 (peptide based formula) at goal rate 50 mL/hr  Recommend water bolus 115 mL q 6 hours.     NUTRITION ASSESSMENT   Pt seen during IPOC critical care rounds on mechanical ventilation. Propofol infusing at 16.2 mL/hr to provide 428 calories from fat daily. Levophed at 2 mcg/min. No family at bedside to provide nutrition hx. Weight has been stable per hx in EMR and pt appears adequately nourished. OK to start tube feeds today per Dr. Zamora.   Nutrition Related Findings: +2 generalized, BUE, and BLE edema. No BM noted. Lytes WNL.  Wounds: None  Nutrition Education:  Education not indicated   Nutrition Goals: Tolerate nutrition support at goal rate     MALNUTRITION ASSESSMENT   Malnutrition Status: No malnutrition    NUTRITION DIAGNOSIS   Inadequate oral intake related to impaired respiratory function as evidenced by intubation      CURRENT NUTRITION THERAPIES  Diet NPO     PO Intake: NPO   PO Supplement Intake:NPO    Current Tube Feeding (TF) Orders:  Feeding Route: Orogastric  Formula: Peptide Based  Schedule: Continuous  Water Flushes: 115 mL q 6 hours  Goal TF & Flush Orders Provides: Vital AF 1.2 at goal rate 50 mL/hr to provide 1200 mL total volume, 1440 calories, 90 grams protein, 973 mL free water.    ANTHROPOMETRICS  Current Height: 162.6 cm (5' 4\")  Current Weight - Scale: 94.3 kg (207 lb 14.3 oz)    Ideal Body Weight (IBW): 120 lbs  (55 kg)        BMI: 35.7    COMPARATIVE STANDARDS  Total Energy Requirements (kcals/day): 3394-0936     Protein (g):  109 grams       Fluid (mL/day):  7875-4124 mL    The patient will be monitored per nutrition standards of care. Consult dietitian if additional nutrition interventions are needed prior to RD reassessment.     Char Garrison, MS, RD, LD    Contact: [phone]  
Patient  and spouse voiced understanding of discharge instructions,retrieved medications from pharmacy,and discharged home with plan for home nursing care.  
Pt. Awoke with much pain, generalized and headache. Percocet for pain along with regular scheduled pain  medications. Spouse at bedside to consol wife. Right IJ removed per protocol. Poor PO intake for breakfast. Call light in reach and bed alarm intact.  
Speech Language Pathology   Attempt Note      Crow Bonner   1970    SLP attempted to see pt for dysphagia follow-up however pt noted to have increased fatigue and reduced alertness. SLP provided education to pt's  to ensure pt is only eating when fully alert,  v/u. Will follow-up as pt appropriate and schedule allows.     Aranza Sanchez M.A. CFY-SLP [id number]  Speech-Language Pathologist 3/17/2024        
Speech Language Pathology   Attempt Note     Crow Bonner   1970    SLP attempted to see pt this date for dysphagia intervention. Pt was sleeping upon entry. RN was in the room with pt who reported good tolerance of recommended diet of Dysphagia III Soft and bite sized  and Mildly (nectar) thick liquids. Rn reported no immediate cough response on mildly thick liquids. Will attempt again when pt is awake and alert.     Rusty Kumar MA CF-SLP   COND. 1970  3/16/2024 11:01 AM   
[facility] Pulmonary/CCM Progress note      Admit Date: 3/13/2024    Chief Complaint: Altered mental status fevers and profound hypoxia    Subjective:     Interval History: Awake and trying to respond, remains on the ventilator-35%, PEEP of 5.  Tan-colored secretions upon suction.  Requiring low-dose norepinephrine drip.  Appears volume overloaded/severe anasarca.    Scheduled Meds:   ipratropium 0.5 mg-albuterol 2.5 mg  1 Dose Inhalation TID    insulin lispro  0-8 Units SubCUTAneous Q4H    brexpiprazole  2 mg Oral Nightly    [Held by provider] DULoxetine  60 mg Oral BID    folic acid  1,000 mcg Oral Daily    [START ON 3/15/2024] leflunomide  20 mg Oral Daily    montelukast  10 mg Oral Nightly    [Held by provider] ranolazine  500 mg Oral BID    sulfaSALAzine  500 mg Oral BID    [START ON 3/15/2024] aspirin  81 mg Oral Daily    sodium chloride flush  5-40 mL IntraVENous 2 times per day    clopidogrel  75 mg Oral Daily    mometasone-formoterol  2 puff Inhalation BID RT    levothyroxine  150 mcg Oral QAM AC    metoprolol tartrate  25 mg Oral BID    rosuvastatin  20 mg Oral Nightly    [Held by provider] tamsulosin  0.4 mg Oral Nightly    sodium chloride flush  5-40 mL IntraVENous 2 times per day    pantoprazole  40 mg IntraVENous Daily    heparin (porcine)  5,000 Units SubCUTAneous 3 times per day    piperacillin-tazobactam  3,375 mg IntraVENous Q8H     Continuous Infusions:   propofol 35 mcg/kg/min (03/14/24 1328)    sodium chloride      sodium chloride      fentaNYL 125 mcg/hr (03/14/24 1327)    norepinephrine 2 mcg/min (03/14/24 0522)    dextrose       PRN Meds:albuterol, butalbital-acetaminophen-caffeine, meclizine, methocarbamol, sodium chloride flush, sodium chloride, sodium chloride flush, sodium chloride, polyethylene glycol, acetaminophen **OR** acetaminophen, dextrose bolus **OR** dextrose bolus, glucagon (rDNA), dextrose    Review of Systems  Unable to obtain since patient is currently intubated and critically 
Urine specimens collected, labeled and sent to lab without issue. Awaiting results at this time.   
dextrose bolus, glucagon (rDNA), dextrose    Review of Systems  Unable to obtain since patient is currently intubated and critically ill    Objective:     Patient Vitals for the past 8 hrs:   BP Temp Temp src Pulse Resp SpO2 Weight   03/15/24 1100 (!) 155/73 99.9 °F (37.7 °C) Bladder 93 20 95 % --   03/15/24 0900 (!) 149/81 -- -- (!) 102 20 99 % --   03/15/24 0846 (!) 148/75 -- -- (!) 111 17 100 % --   03/15/24 0800 (!) 98/54 -- -- 76 12 93 % --   03/15/24 0701 -- -- -- 84 11 92 % --   03/15/24 0700 (!) 94/55 100 °F (37.8 °C) Bladder 88 11 (!) 89 % --   03/15/24 0645 -- -- -- 84 12 91 % --   03/15/24 0630 -- -- -- 94 12 91 % --   03/15/24 0615 -- -- -- 95 11 94 % --   03/15/24 0600 (!) 100/57 -- -- (!) 101 15 93 % --   03/15/24 0545 106/60 -- -- (!) 105 15 94 % --   03/15/24 0530 (!) 119/92 -- -- (!) 109 22 -- --   03/15/24 0515 (!) 156/84 -- -- (!) 101 15 90 % --   03/15/24 0511 -- -- -- -- -- -- 94.4 kg (208 lb 1.8 oz)   03/15/24 0500 (!) 111/56 -- -- 75 11 96 % --   03/15/24 0445 (!) 111/57 99.4 °F (37.4 °C) Bladder 71 11 96 % --   03/15/24 0430 (!) 114/56 -- -- 70 10 96 % --       I/O last 3 completed shifts:  In: 5548 [I.V.:3056.6; NG/GT:337; IV Piggyback:2154.4]  Out: 5495 [Urine:5245; Emesis/NG output:250]  No intake/output data recorded.    General Appearance: Intubated, in no acute distress  Skin: warm and dry, no rash or erythema  Head: normocephalic and atraumatic  Eyes: pupils equal, round, and reactive to light, extraocular eye movements intact, conjunctivae normal  ENT: external ear and ear canal normal bilaterally, nose without deformity, nasal mucosa and turbinates normal  Neck: supple and non-tender without mass, no cervical lymphadenopathy  Pulmonary/Chest: rales present-bilateral, poor air entry at both bases  Cardiovascular: normal rate, regular rhythm,  no murmurs, rubs, distal pulses intact, no carotid bruits  Abdomen: soft, non-tender, non-distended, normal bowel sounds, no masses or 
flush, sodium chloride, polyethylene glycol, acetaminophen **OR** acetaminophen, dextrose bolus **OR** dextrose bolus, glucagon (rDNA), dextrose    Review of Systems  As per interval history and the rest of the 14 point systems were sought and noted to be negative    Objective:     Patient Vitals for the past 8 hrs:   BP Temp Temp src Pulse Resp SpO2 Weight   03/17/24 1000 (!) 169/81 -- -- 83 15 98 % --   03/17/24 0912 -- -- -- -- 22 -- --   03/17/24 0901 132/65 -- -- 89 -- -- --   03/17/24 0800 -- -- -- 77 -- -- --   03/17/24 0800 (!) 158/71 -- -- 77 11 97 % --   03/17/24 0700 122/68 97.8 °F (36.6 °C) Temporal 79 15 94 % --   03/17/24 0600 129/63 -- -- 70 14 97 % --   03/17/24 0500 (!) 109/56 -- -- 68 (!) 3 -- 89.4 kg (197 lb 1.5 oz)   03/17/24 0400 (!) 172/68 98.7 °F (37.1 °C) Temporal 77 10 94 % --       I/O last 3 completed shifts:  In: 1193.6 [P.O.:420; IV Piggyback:773.6]  Out: 4750 [Urine:4750]  I/O this shift:  In: -   Out: 225 [Urine:225]    General Appearance: Lethargic, in no acute distress  Skin: warm and dry, no rash or erythema  Head: normocephalic and atraumatic  Eyes: pupils equal, round, and reactive to light, extraocular eye movements intact, conjunctivae normal  ENT: external ear and ear canal normal bilaterally, nose without deformity, nasal mucosa and turbinates normal  Neck: supple and non-tender without mass, no cervical lymphadenopathy  Pulmonary/Chest: rales present-bilateral, poor air entry at both bases  Cardiovascular: normal rate, regular rhythm,  no murmurs, rubs, distal pulses intact, no carotid bruits  Abdomen: soft, non-tender, non-distended, normal bowel sounds, no masses or organomegaly  Lymph Nodes: Cervical, supraclavicular normal  Extremities: Anasarca, left upper extremity lymphedema  Musculoskeletal: normal range of motion, no joint swelling, deformity or tenderness  Neurologic: Lethargic, no focal neurologic deficits    Data Review:  CBC:   Lab Results   Component Value 
mL IntraVENous 2 times per day    clopidogrel  75 mg Oral Daily    mometasone-formoterol  2 puff Inhalation BID RT    levothyroxine  150 mcg Oral QAM AC    metoprolol tartrate  25 mg Oral BID    rosuvastatin  20 mg Oral Nightly    [Held by provider] tamsulosin  0.4 mg Oral Nightly    sodium chloride flush  5-40 mL IntraVENous 2 times per day    pantoprazole  40 mg IntraVENous Daily    heparin (porcine)  5,000 Units SubCUTAneous 3 times per day    piperacillin-tazobactam  3,375 mg IntraVENous Q8H     PRN Meds: oxyCODONE-acetaminophen, metoprolol, HYDROmorphone, potassium chloride **OR** potassium chloride, magnesium sulfate, sodium phosphate 15 mmol in sodium chloride 0.9 % 250 mL IVPB, albuterol, butalbital-acetaminophen-caffeine, meclizine, methocarbamol, sodium chloride flush, sodium chloride, sodium chloride flush, sodium chloride, polyethylene glycol, acetaminophen **OR** acetaminophen, dextrose bolus **OR** dextrose bolus, glucagon (rDNA), dextrose      Intake/Output Summary (Last 24 hours) at 3/15/2024 1247  Last data filed at 3/15/2024 1200  Gross per 24 hour   Intake 1460.64 ml   Output 3675 ml   Net -2214.36 ml         Physical Exam Performed:    BP (!) 151/78   Pulse 92   Temp 99.9 °F (37.7 °C) (Bladder)   Resp 21   Ht 1.626 m (5' 4\")   Wt 94.4 kg (208 lb 1.8 oz)   SpO2 92%   BMI 35.72 kg/m²     General appearance: Intubated and not sedated. responsive.  HEENT: Pupils equal, round, and reactive to light. Conjunctivae/corneas clear.  Neck: Supple. No jugular venous distention. Trachea midline.  Respiratory:  Normal respiratory effort. Slight crackles bilaterally.  Cardiovascular: Regular rate and rhythm with normal S1/S2 without murmurs, rubs or gallops. Left arm lymphedema. Minimal edema other extremities.  Abdomen: Soft, non-tender, non-distended with normal bowel sounds.  Musculoskeletal: No clubbing or cyanosis.  No gross deformities.  Skin: Skin color, texture, turgor normal.  No rashes or 
Brisk, 3 seconds, normal      Labs:   Recent Labs     03/13/24  1605 03/14/24  0420   WBC 16.7* 23.3*   HGB 12.1 11.1*   HCT 38.0 33.7*    213     Recent Labs     03/13/24  1605 03/14/24  0420    137   K 4.5 3.5   CL 93* 96*   CO2 29 30   BUN 58* 47*   CREATININE 2.2* 1.9*   CALCIUM 9.1 8.4     Recent Labs     03/13/24  1605   AST 17   ALT 15   BILITOT <0.2   ALKPHOS 126     No results for input(s): \"INR\" in the last 72 hours.  No results for input(s): \"CKTOTAL\", \"TROPONINI\" in the last 72 hours.    Urinalysis:      Lab Results   Component Value Date/Time    NITRU Negative 03/13/2024 04:50 PM    WBCUA 2 03/13/2024 04:50 PM    BACTERIA None Seen 03/13/2024 04:50 PM    RBCUA 44 03/13/2024 04:50 PM    BLOODU SMALL 03/13/2024 04:50 PM    SPECGRAV 1.015 03/13/2024 04:50 PM    GLUCOSEU 250 03/13/2024 04:50 PM    GLUCOSEU >=1000 07/08/2010 03:25 PM       Radiology:  XR CHEST PORTABLE   Final Result   Interval placement of the right IJ and subclavian central venous catheter   without discrete pneumothorax seen.      Other supporting devices as above.      Bilateral airspace opacities, which appears slightly worsened.         XR CHEST PORTABLE   Final Result   1. No difference in the appearance of multifocal fluffy airspace opacities   throughout the bilateral lungs.         XR CHEST PORTABLE   Final Result   1. Bilateral infiltrates which could represent pneumonia or edema.  Follow-up   to resolution is recommended.   2. Endotracheal tube in satisfactory position.         CT CHEST ABDOMEN PELVIS WO CONTRAST Additional Contrast? None   Final Result   1. Endotracheal tube tip within the right mainstem bronchus.  Repositioning   is recommended.   2. Nodular consolidations noted throughout both lungs, most notably within   the right upper lobe, concerning for multifocal pneumonia.  Follow-up to   resolution.   3. Scattered foci of air and fluid noted within the right shoulder joint.   These findings may be 
seen.     There is nonspecific foci of gas density beneath the right coracoid process.     IMPRESSION:  Interval placement of the right IJ and subclavian central venous catheter  without discrete pneumothorax seen.     Other supporting devices as above.     Bilateral airspace opacities, which appears slightly worsened.    Problem List:     Acute hypoxic respiratory failure  Multifocal pneumonia/aspiration  Lactic acidosis  Diastolic CHF, chronic  Altered mental status  MARIA D on CKD.    Assessment/Plan:     Acute hypoxic respiratory failure.  Extubated, on 2 L O2.  Chest x-ray shows improvement in infiltrates.  Related to combination of volume overload/pulmonary edema and aspiration.  Fever curve has improved.  Continue Zosyn.  Repeat chest x-ray tomorrow a.m.     Severe sepsis/lactic acidosis, continue Zosyn, WBC 23>11.  Now off norepinephrine drip.       History of severe diastolic CHF and anasarca.  Preserved EF.  Continue Lasix 40 mg twice daily-great urine output.  4 L output.     Altered mental status, possibly related to combination of sepsis + profound hypoxia.  Patient also takes OxyContin/Percocet at home.  CT head shows no acute intracranial abnormality.     MARIA D on CKD, creatinine 2.2>1.2.  Possibly related to sepsis from aspiration.  Hemodynamically stable, great urine output with IV Lasix.     Okay for transfer out of ICU.  Pulmonary will follow for 1 day.    Flaco Walker MD      
[facility]. Technical Quality:Adequate visualization. Velocities are measured in cm/s ; Diameters are measured in mm Right UE Vein Measurements 2D Measurements +--------+----------+---------------+----------+ !Location!Visualized!Compressibility!Thrombosis! +--------+----------+---------------+----------+ !IJV     !Yes       !Yes            !None      ! +--------+----------+---------------+----------+ !SCV     !Yes       !Yes            !None      ! +--------+----------+---------------+----------+ Doppler Measurements +--------+------+------+ !Location!Signal!Reflux! +--------+------+------+ !IJV     !Phasic!      ! +--------+------+------+ !SCV     !Phasic!      ! +--------+------+------+ Left UE Vein Measurements 2D Measurements +----------+----------+---------------+----------+ !Location  !Visualized!Compressibility!Thrombosis! +----------+----------+---------------+----------+ !IJV       !Yes       !Yes            !None      ! +----------+----------+---------------+----------+ !SCV       !Yes       !Yes            !None      ! +----------+----------+---------------+----------+ !Innominate!Yes       !Yes            !None      ! +----------+----------+---------------+----------+ !Axillary  !Yes       !Yes            !None      ! +----------+----------+---------------+----------+ !Brachial  !Yes       !Yes            !None      ! +----------+----------+---------------+----------+ !Radial    !Yes       !Yes            !None      ! +----------+----------+---------------+----------+ !Ulnar     !Yes       !Yes            !None      ! +----------+----------+---------------+----------+ !Basilic   !Yes       !Yes            !None      ! +----------+----------+---------------+----------+ !Cephalic  !Yes       !Yes            !None      ! +----------+----------+---------------+----------+ Doppler Measurements +--------+------+------+ !Location!Signal!Reflux! +--------+------+------+ !IJV     !Phasic!      ! 
+------------------------+----------+---------------+----------+ !Mid Femoral             !Yes       !Yes            !None      ! +------------------------+----------+---------------+----------+ !Dist Femoral            !Yes       !Yes            !None      ! +------------------------+----------+---------------+----------+ !Deep Femoral            !Yes       !Yes            !None      ! +------------------------+----------+---------------+----------+ !Popliteal               !Yes       !Yes            !None      ! +------------------------+----------+---------------+----------+ !GSV Below Knee          !Yes       !Yes            !None      ! +------------------------+----------+---------------+----------+ !Gastroc                 !Yes       !Yes            !None      ! +------------------------+----------+---------------+----------+ !Soleal                  !Yes       !Yes            !None      ! +------------------------+----------+---------------+----------+ !PTV                     !Yes       !Yes            !None      ! +------------------------+----------+---------------+----------+ !Peroneal                !Yes       !Yes            !None      ! +------------------------+----------+---------------+----------+ !GSV Calf                !Yes       !Yes            !None      ! +------------------------+----------+---------------+----------+ !SSV                     !Yes       !Yes            !None      ! +------------------------+----------+---------------+----------+ Right Doppler Measurements +------------------------+---------+------+------------+ !Location                !Signal   !Reflux!Reflux (sec)! +------------------------+---------+------+------------+ !Sapheno Femoral Junction!Pulsatile!No    !            ! +------------------------+---------+------+------------+ !Common Femoral          !Pulsatile!No    !            ! +------------------------+---------+------+------------+ !Femoral                 
+--------------+---------+------+------------+ !Common Femoral!Pulsatile!No    !            ! +--------------+---------+------+------------+    XR CHEST PORTABLE    Result Date: 3/6/2024  EXAMINATION: ONE XRAY VIEW OF THE CHEST 3/6/2024 5:18 pm COMPARISON: 04/11/2023 HISTORY: ORDERING SYSTEM PROVIDED HISTORY: SOB TECHNOLOGIST PROVIDED HISTORY: Reason for exam:->SOB Reason for Exam: sob FINDINGS: There is a port in place with distal tip overlying the superior vena cava. The lungs are otherwise clear.  The heart appears unremarkable.  Bony structures appear normal.     No acute cardiopulmonary process.           Imaging: [unfilled]         ======================================================================  Please note that this chart entry has been generated using voice recognition software, mainly.  So please excuse brevity and/or typos.  While every effort and attempts have been made to ensure the accuracy of this automated transcription, some errors may have occurred; and certain words and phrases in transcription may not be entered as intended.  However, inadvertent computerized transcription errors may be present.  So please contact us if any clarification needed.

## 2024-03-18 NOTE — TELEPHONE ENCOUNTER
Chart reviewed.  Pt DCing today from [facility].  Torsemide was sent in per Malik Toscano.     Spoke to Melvina Pharmacist to clarify Torsemide 100mg in the AM and Torsemide 50mg in the PM and that patient is being DC today from [facility] with [facility].  Gave contact number      10mg dose went to [facility].    100mg dose went to [facility] pharmacy.      She states she will update her system.

## 2024-03-18 NOTE — DISCHARGE INSTR - DIET
Good nutrition is important when healing from an illness, injury, or surgery.  Follow any nutrition recommendations given to you during your hospital stay.   If you were given an oral nutrition supplement while in the hospital, continue to take this supplement at home.  You can take it with meals, in-between meals, and/or before bedtime. These supplements can be purchased at most local grocery stores, pharmacies, and chain [employer]-stores.   If you have any questions about your diet or nutrition, call the hospital and ask for the dietitian.  Low salt,Carb Control Diabetic diet

## 2024-03-18 NOTE — TELEPHONE ENCOUNTER
[facility] called the request sent on 3/6/2024 stating they have questions about the RX torsemide, they would like to know if ARETHA is the only one prescribing and the dosage?      Pls advise thank you  [phone]

## 2024-03-18 NOTE — CARE COORDINATION
Discharge Planning Note:    Chart reviewed and it appears that patient has minimal needs for discharge at this time. Risk Score 18 %     Primary Care Physician is Mariana Simental,    [facility]    CM will follow for DC needs, pt remains on 2 LPM O2.    PT/OT evals pending.    Please notify case management if any discharge needs are identified.      Case management will continue to follow progress and update discharge plan as needed.

## 2024-03-18 NOTE — DISCHARGE INSTR - COC
Continuity of Care Form    Patient Name: Crow Bonner   :  1970  MRN:  [id number]    Admit date:  3/13/2024  Discharge date:  ***    Code Status Order: Full Code   Advance Directives:     Admitting Physician:  Huey Castellanos MD  PCP: Mariana Simental DO    Discharging Nurse: ***  Discharging Hospital Unit/Room#: ICU-3902/3902-01  Discharging Unit Phone Number: ***    Emergency Contact:   Extended Emergency Contact Information  Primary Emergency Contact: BonnerJanel  Address: [address] [place] [address]            [address]  Home Phone: [phone]  Relation: Spouse  Secondary Emergency Contact: Marycruz Bonner  Home Phone: [phone]  Relation: Child  Preferred language: English   needed? No    Past Surgical History:  Past Surgical History:   Procedure Laterality Date    BREAST SURGERY      left mastectomy    CARPAL TUNNEL RELEASE      Bilateral    FINGER CONTRACTURE SURGERY      HYSTERECTOMY (CERVIX STATUS UNKNOWN)  2005    USO    MASTECTOMY Left 2007    Lymphadema noted in LUE in office visit 2024    TONSILLECTOMY      UPPER GASTROINTESTINAL ENDOSCOPY  2019    stretched esophagous        Immunization History:   Immunization History   Administered Date(s) Administered    COVID-19, MODERNA BLUE border, Primary or Immunocompromised, (age 12y+), IM, 100 mcg/0.5mL 2021, 2021, 2021    Influenza Vaccine, unspecified formulation 10/25/2010, 2011, 2012, 2014    Influenza Virus Vaccine 10/25/2010, 2011, 2012, 2014, 2016    Influenza, AFLURIA (age 3 yrs+), FLUZONE, (age 6 mo+), MDV, 0.5mL 10/07/2015    Influenza, FLUARIX, FLULAVAL, FLUZONE (age 6 mo+) AND AFLURIA, (age 3 y+), PF, 0.5mL 2016, 10/21/2019, 2021    Influenza, FLUCELVAX, (age 6 mo+), MDCK, PF, 0.5mL 10/26/2022, 10/16/2023    Pneumococcal, PPSV23, PNEUMOVAX 23, (age 2y+), SC/IM, 0.5mL 2016    TDaP, ADACEL (age 10y-64y),

## 2024-03-18 NOTE — CARE COORDINATION
Discharge Planning Note:    MATILDA spoke with Jose Enrique at [facility] who services pt in the past and willing to accept again if HHC is needed.  PT/OT evals remain pending.     Electronically signed by Deysi Olivera RN on 3/18/2024 at 10:34 AM'

## 2024-03-18 NOTE — PLAN OF CARE
Problem: Discharge Planning  Goal: Discharge to home or other facility with appropriate resources  Outcome: Progressing  Flowsheets (Taken 3/17/2024 2000)  Discharge to home or other facility with appropriate resources:   Identify barriers to discharge with patient and caregiver   Arrange for needed discharge resources and transportation as appropriate   Identify discharge learning needs (meds, wound care, etc)   Refer to discharge planning if patient needs post-hospital services based on physician order or complex needs related to functional status, cognitive ability or social support system     Problem: Pain  Goal: Verbalizes/displays adequate comfort level or baseline comfort level  Outcome: Progressing     Problem: Safety - Medical Restraint  Goal: Remains free of injury from restraints (Restraint for Interference with Medical Device)  Description: INTERVENTIONS:  1. Determine that other, less restrictive measures have been tried or would not be effective before applying the restraint  2. Evaluate the patient's condition at the time of restraint application  3. Inform patient/family regarding the reason for restraint  4. Q2H: Monitor safety, psychosocial status, comfort, nutrition and hydration  Outcome: Progressing     Problem: Safety - Adult  Goal: Free from fall injury  Outcome: Progressing  Flowsheets (Taken 3/18/2024 0300)  Free From Fall Injury: Instruct family/caregiver on patient safety     Problem: Skin/Tissue Integrity  Goal: Absence of new skin breakdown  Description: 1.  Monitor for areas of redness and/or skin breakdown  2.  Assess vascular access sites hourly  3.  Every 4-6 hours minimum:  Change oxygen saturation probe site  4.  Every 4-6 hours:  If on nasal continuous positive airway pressure, respiratory therapy assess nares and determine need for appliance change or resting period.  Outcome: Progressing     Problem: ABCDS Injury Assessment  Goal: Absence of physical injury  Outcome:

## 2024-03-18 NOTE — DISCHARGE SUMMARY
03/18/2024 04:55 AM     03/18/2024 04:55 AM       Renal:    Lab Results   Component Value Date/Time     03/18/2024 04:55 AM    K 3.4 03/18/2024 04:55 AM    K 3.5 03/14/2024 04:20 AM    CL 95 03/18/2024 04:55 AM    CO2 35 03/18/2024 04:55 AM    BUN 18 03/18/2024 04:55 AM    CREATININE 0.9 03/18/2024 04:55 AM    CALCIUM 9.8 03/18/2024 04:55 AM    PHOS 3.0 03/18/2024 04:55 AM         Significant Diagnostic Studies    Radiology:   XR CHEST PORTABLE   Final Result   1. Improved aeration.         XR CHEST PORTABLE   Final Result   Improved aeration of the lungs         XR CHEST PORTABLE   Final Result   Interval placement of the right IJ and subclavian central venous catheter   without discrete pneumothorax seen.      Other supporting devices as above.      Bilateral airspace opacities, which appears slightly worsened.         XR CHEST PORTABLE   Final Result   1. No difference in the appearance of multifocal fluffy airspace opacities   throughout the bilateral lungs.         XR CHEST PORTABLE   Final Result   1. Bilateral infiltrates which could represent pneumonia or edema.  Follow-up   to resolution is recommended.   2. Endotracheal tube in satisfactory position.         CT CHEST ABDOMEN PELVIS WO CONTRAST Additional Contrast? None   Final Result   1. Endotracheal tube tip within the right mainstem bronchus.  Repositioning   is recommended.   2. Nodular consolidations noted throughout both lungs, most notably within   the right upper lobe, concerning for multifocal pneumonia.  Follow-up to   resolution.   3. Scattered foci of air and fluid noted within the right shoulder joint.   These findings may be secondary to infection or recent joint injection.  No   underlying osseous abnormality is identified.  Orthopedic follow-up is   recommended.   4. Dilated gallbladder with small layering stones or sludge.  If there is   concern for acute cholecystitis, follow-up right upper quadrant ultrasound   may be

## 2024-03-18 NOTE — CARE COORDINATION
Case Management -  Discharge Note      Patient Name: Crow Bonner                   YOB: 1970            Readmission Risk (Low < 19, Mod (19-27), High > 27): Readmission Risk Score: 17.1    Current PCP: Mariana Simental DO      PT AM-PAC: 18 /24  OT AM-PAC: 19 /24    Patient/patient representative has been educated on the benefits of Home Health Care as well as the possible risks of declining recommended services. Patient/patient representative has acknowledged the information provided and decided on the following discharge plan. Patient/ patient representative has been provided freedom of choice regarding service provider, supported by basic dialogue that supports the patient's individualized plan of care/goals.    Home Care Information:   Is patient resuming current home health care services: No    Home Care Agency:   [facility]  [address] [place], [place] [address]  Phone [phone]  Fax [phone]              Services: PT, OT, SN  Home Health Order Obtained: Yes    Home health agency notified of discharge. - CM spoke to Jose Enrique      Financial    Payor: [facility] / Plan: [facility] MEDICAID / Product Type: *No Product type* /     Pharmacy:  Potential assistance Purchasing Medications:    Meds-to-Beds request: Yes      [id number] - [place], [place] - [address] - P [phone] - F [phone]  [address]  [facility] [address]  Phone: [phone] Fax: [phone]      Electronically signed by Deysi Olivera RN on 3/18/2024 at 1:43 PM

## 2024-03-21 ENCOUNTER — TELEPHONE (OUTPATIENT)
Dept: CARDIOLOGY CLINIC | Age: 54
End: 2024-03-21

## 2024-03-21 ENCOUNTER — TELEPHONE (OUTPATIENT)
Dept: ENDOCRINOLOGY | Age: 54
End: 2024-03-21

## 2024-03-21 RX ORDER — SPIRONOLACTONE 25 MG/1
50 TABLET ORAL 2 TIMES DAILY
COMMUNITY
End: 2024-03-21 | Stop reason: SDUPTHER

## 2024-03-21 RX ORDER — SPIRONOLACTONE 25 MG/1
50 TABLET ORAL 2 TIMES DAILY
Qty: 360 TABLET | Refills: 3 | Status: ON HOLD | OUTPATIENT
Start: 2024-03-21

## 2024-03-21 NOTE — TELEPHONE ENCOUNTER
Please restart the spironolactone.  She needs this.  Have her take an extra 50 mg of torsemide until her weight is back to 189.  That would mean torsemide 100 mg bid.  ARETHA

## 2024-03-21 NOTE — TELEPHONE ENCOUNTER
Spoke with pt's spouse,  Pt is SOB and in the bed.  He 02 sats drop to 82 or 83 if she gets out of bed.    Wt is up to 195 from 189 just 2 days ago.    Pt has dizziness and has increased swelling.    Torsemide 100mg am and 6 hours later takes 50mg.  Jardiance 10mg.    Spouse reports Spironolactone was discontinued.

## 2024-03-21 NOTE — TELEPHONE ENCOUNTER
states pt has a weight increase over the past 2 days from 189 to 195. Pt has increased swelling, SOB and dizzy. Please call to advise.

## 2024-03-21 NOTE — TELEPHONE ENCOUNTER
SPoke with pt's spouse  Relayed Results and instructions as directed    Sent script for spironolactone to local pharmacy

## 2024-03-23 ENCOUNTER — APPOINTMENT (OUTPATIENT)
Dept: CT IMAGING | Age: 54
DRG: 469 | End: 2024-03-23
Payer: COMMERCIAL

## 2024-03-23 ENCOUNTER — HOSPITAL ENCOUNTER (OUTPATIENT)
Age: 54
Setting detail: OBSERVATION
LOS: 2 days | Discharge: HOME | DRG: 469 | End: 2024-03-25
Attending: EMERGENCY MEDICINE | Admitting: HOSPITALIST
Payer: COMMERCIAL

## 2024-03-23 ENCOUNTER — APPOINTMENT (OUTPATIENT)
Dept: GENERAL RADIOLOGY | Age: 54
DRG: 469 | End: 2024-03-23
Payer: COMMERCIAL

## 2024-03-23 DIAGNOSIS — R93.5 ABNORMAL CT OF THE ABDOMEN: ICD-10-CM

## 2024-03-23 DIAGNOSIS — J18.9 PNEUMONIA DUE TO INFECTIOUS ORGANISM, UNSPECIFIED LATERALITY, UNSPECIFIED PART OF LUNG: ICD-10-CM

## 2024-03-23 DIAGNOSIS — N17.9 AKI (ACUTE KIDNEY INJURY) (HCC): Primary | ICD-10-CM

## 2024-03-23 LAB
ALBUMIN SERPL-MCNC: 3.7 G/DL (ref 3.4–5)
ALBUMIN/GLOB SERPL: 1 {RATIO} (ref 1.1–2.2)
ALP SERPL-CCNC: 101 U/L (ref 40–129)
ALT SERPL-CCNC: 22 U/L (ref 10–40)
ANION GAP SERPL CALCULATED.3IONS-SCNC: 17 MMOL/L (ref 3–16)
AST SERPL-CCNC: 26 U/L (ref 15–37)
BASOPHILS # BLD: 0 K/UL (ref 0–0.2)
BASOPHILS NFR BLD: 0 %
BILIRUB SERPL-MCNC: <0.2 MG/DL (ref 0–1)
BUN SERPL-MCNC: 56 MG/DL (ref 7–20)
CALCIUM SERPL-MCNC: 8.2 MG/DL (ref 8.3–10.6)
CHLORIDE SERPL-SCNC: 91 MMOL/L (ref 99–110)
CHLORIDE UR-SCNC: <20 MMOL/L
CO2 SERPL-SCNC: 24 MMOL/L (ref 21–32)
CREAT SERPL-MCNC: 4.3 MG/DL (ref 0.6–1.1)
CREAT UR-MCNC: 148.8 MG/DL (ref 28–259)
DEPRECATED RDW RBC AUTO: 14.4 % (ref 12.4–15.4)
EOSINOPHIL # BLD: 0.1 K/UL (ref 0–0.6)
EOSINOPHIL NFR BLD: 1 %
GFR SERPLBLD CREATININE-BSD FMLA CKD-EPI: 12 ML/MIN/{1.73_M2}
GLUCOSE BLD-MCNC: 138 MG/DL (ref 70–99)
GLUCOSE BLD-MCNC: 64 MG/DL (ref 70–99)
GLUCOSE SERPL-MCNC: 125 MG/DL (ref 70–99)
HCT VFR BLD AUTO: 32.3 % (ref 36–48)
HGB BLD-MCNC: 10.1 G/DL (ref 12–16)
LACTATE BLDV-SCNC: 1.2 MMOL/L (ref 0.4–2)
LYMPHOCYTES # BLD: 1.6 K/UL (ref 1–5.1)
LYMPHOCYTES NFR BLD: 11 %
MCH RBC QN AUTO: 27.5 PG (ref 26–34)
MCHC RBC AUTO-ENTMCNC: 31.4 G/DL (ref 31–36)
MCV RBC AUTO: 87.6 FL (ref 80–100)
METAMYELOCYTES NFR BLD MANUAL: 1 %
MONOCYTES # BLD: 1.3 K/UL (ref 0–1.3)
MONOCYTES NFR BLD: 9 %
NEUTROPHILS # BLD: 11.1 K/UL (ref 1.7–7.7)
NEUTROPHILS NFR BLD: 74 %
NEUTS BAND NFR BLD MANUAL: 4 % (ref 0–7)
NT-PROBNP SERPL-MCNC: 242 PG/ML (ref 0–124)
PERFORMED ON: ABNORMAL
PERFORMED ON: ABNORMAL
PLATELET # BLD AUTO: 319 K/UL (ref 135–450)
PLATELET BLD QL SMEAR: ADEQUATE
PMV BLD AUTO: 9 FL (ref 5–10.5)
POTASSIUM SERPL-SCNC: 4.7 MMOL/L (ref 3.5–5.1)
POTASSIUM UR-SCNC: 59.8 MMOL/L
PROT SERPL-MCNC: 7.3 G/DL (ref 6.4–8.2)
PROT UR-MCNC: 27 MG/DL
RBC # BLD AUTO: 3.69 M/UL (ref 4–5.2)
RBC MORPH BLD: NORMAL
SLIDE REVIEW: ABNORMAL
SODIUM SERPL-SCNC: 132 MMOL/L (ref 136–145)
SODIUM UR-SCNC: <20 MMOL/L
TROPONIN, HIGH SENSITIVITY: 33 NG/L (ref 0–14)
TROPONIN, HIGH SENSITIVITY: 34 NG/L (ref 0–14)
WBC # BLD AUTO: 14.1 K/UL (ref 4–11)

## 2024-03-23 PROCEDURE — 81003 URINALYSIS AUTO W/O SCOPE: CPT

## 2024-03-23 PROCEDURE — 84484 ASSAY OF TROPONIN QUANT: CPT

## 2024-03-23 PROCEDURE — 85025 COMPLETE CBC W/AUTO DIFF WBC: CPT

## 2024-03-23 PROCEDURE — 71250 CT THORAX DX C-: CPT

## 2024-03-23 PROCEDURE — 51798 US URINE CAPACITY MEASURE: CPT

## 2024-03-23 PROCEDURE — 2580000003 HC RX 258: Performed by: EMERGENCY MEDICINE

## 2024-03-23 PROCEDURE — 84300 ASSAY OF URINE SODIUM: CPT

## 2024-03-23 PROCEDURE — 99285 EMERGENCY DEPT VISIT HI MDM: CPT

## 2024-03-23 PROCEDURE — 36415 COLL VENOUS BLD VENIPUNCTURE: CPT

## 2024-03-23 PROCEDURE — 84133 ASSAY OF URINE POTASSIUM: CPT

## 2024-03-23 PROCEDURE — 87040 BLOOD CULTURE FOR BACTERIA: CPT

## 2024-03-23 PROCEDURE — 82436 ASSAY OF URINE CHLORIDE: CPT

## 2024-03-23 PROCEDURE — 83036 HEMOGLOBIN GLYCOSYLATED A1C: CPT

## 2024-03-23 PROCEDURE — 6370000000 HC RX 637 (ALT 250 FOR IP): Performed by: HOSPITALIST

## 2024-03-23 PROCEDURE — 93005 ELECTROCARDIOGRAM TRACING: CPT | Performed by: PHYSICIAN ASSISTANT

## 2024-03-23 PROCEDURE — 96360 HYDRATION IV INFUSION INIT: CPT

## 2024-03-23 PROCEDURE — 86160 COMPLEMENT ANTIGEN: CPT

## 2024-03-23 PROCEDURE — 2580000003 HC RX 258: Performed by: PHYSICIAN ASSISTANT

## 2024-03-23 PROCEDURE — 80053 COMPREHEN METABOLIC PANEL: CPT

## 2024-03-23 PROCEDURE — 71045 X-RAY EXAM CHEST 1 VIEW: CPT

## 2024-03-23 PROCEDURE — 83880 ASSAY OF NATRIURETIC PEPTIDE: CPT

## 2024-03-23 PROCEDURE — 74176 CT ABD & PELVIS W/O CONTRAST: CPT

## 2024-03-23 PROCEDURE — 86038 ANTINUCLEAR ANTIBODIES: CPT

## 2024-03-23 PROCEDURE — 2580000003 HC RX 258: Performed by: HOSPITALIST

## 2024-03-23 PROCEDURE — 84156 ASSAY OF PROTEIN URINE: CPT

## 2024-03-23 PROCEDURE — 6360000002 HC RX W HCPCS: Performed by: PHYSICIAN ASSISTANT

## 2024-03-23 PROCEDURE — 1200000000 HC SEMI PRIVATE

## 2024-03-23 PROCEDURE — 82570 ASSAY OF URINE CREATININE: CPT

## 2024-03-23 PROCEDURE — 83605 ASSAY OF LACTIC ACID: CPT

## 2024-03-23 RX ORDER — 0.9 % SODIUM CHLORIDE 0.9 %
1000 INTRAVENOUS SOLUTION INTRAVENOUS ONCE
Status: COMPLETED | OUTPATIENT
Start: 2024-03-23 | End: 2024-03-23

## 2024-03-23 RX ORDER — INSULIN GLARGINE 100 [IU]/ML
100 INJECTION, SOLUTION SUBCUTANEOUS NIGHTLY
Status: DISCONTINUED | OUTPATIENT
Start: 2024-03-23 | End: 2024-03-25 | Stop reason: HOSPADM

## 2024-03-23 RX ORDER — OXYCODONE AND ACETAMINOPHEN 10; 325 MG/1; MG/1
1 TABLET ORAL EVERY 4 HOURS PRN
Status: DISCONTINUED | OUTPATIENT
Start: 2024-03-23 | End: 2024-03-23

## 2024-03-23 RX ORDER — METHOCARBAMOL 500 MG/1
750 TABLET, FILM COATED ORAL 3 TIMES DAILY PRN
Status: DISCONTINUED | OUTPATIENT
Start: 2024-03-23 | End: 2024-03-23

## 2024-03-23 RX ORDER — SODIUM CHLORIDE 0.9 % (FLUSH) 0.9 %
5-40 SYRINGE (ML) INJECTION EVERY 12 HOURS SCHEDULED
Status: DISCONTINUED | OUTPATIENT
Start: 2024-03-23 | End: 2024-03-25 | Stop reason: HOSPADM

## 2024-03-23 RX ORDER — ACETAMINOPHEN 650 MG/1
650 SUPPOSITORY RECTAL EVERY 6 HOURS PRN
Status: DISCONTINUED | OUTPATIENT
Start: 2024-03-23 | End: 2024-03-25 | Stop reason: HOSPADM

## 2024-03-23 RX ORDER — LUBIPROSTONE 24 UG/1
24 CAPSULE ORAL 2 TIMES DAILY WITH MEALS
Status: DISCONTINUED | OUTPATIENT
Start: 2024-03-23 | End: 2024-03-25 | Stop reason: HOSPADM

## 2024-03-23 RX ORDER — LEVOTHYROXINE SODIUM 0.07 MG/1
150 TABLET ORAL
Status: DISCONTINUED | OUTPATIENT
Start: 2024-03-24 | End: 2024-03-25 | Stop reason: HOSPADM

## 2024-03-23 RX ORDER — SODIUM CHLORIDE 9 MG/ML
INJECTION, SOLUTION INTRAVENOUS PRN
Status: DISCONTINUED | OUTPATIENT
Start: 2024-03-23 | End: 2024-03-25 | Stop reason: HOSPADM

## 2024-03-23 RX ORDER — ENOXAPARIN SODIUM 100 MG/ML
30 INJECTION SUBCUTANEOUS DAILY
Status: DISCONTINUED | OUTPATIENT
Start: 2024-03-24 | End: 2024-03-24

## 2024-03-23 RX ORDER — GLUCAGON 1 MG/ML
1 KIT INJECTION PRN
Status: DISCONTINUED | OUTPATIENT
Start: 2024-03-23 | End: 2024-03-25 | Stop reason: HOSPADM

## 2024-03-23 RX ORDER — ALBUTEROL SULFATE 2.5 MG/3ML
2.5 SOLUTION RESPIRATORY (INHALATION) EVERY 6 HOURS PRN
Status: DISCONTINUED | OUTPATIENT
Start: 2024-03-23 | End: 2024-03-25

## 2024-03-23 RX ORDER — ONDANSETRON 4 MG/1
4 TABLET, ORALLY DISINTEGRATING ORAL EVERY 8 HOURS PRN
Status: DISCONTINUED | OUTPATIENT
Start: 2024-03-23 | End: 2024-03-25 | Stop reason: HOSPADM

## 2024-03-23 RX ORDER — INSULIN LISPRO 100 [IU]/ML
0-4 INJECTION, SOLUTION INTRAVENOUS; SUBCUTANEOUS NIGHTLY
Status: DISCONTINUED | OUTPATIENT
Start: 2024-03-23 | End: 2024-03-25

## 2024-03-23 RX ORDER — ONDANSETRON 2 MG/ML
4 INJECTION INTRAMUSCULAR; INTRAVENOUS EVERY 6 HOURS PRN
Status: DISCONTINUED | OUTPATIENT
Start: 2024-03-23 | End: 2024-03-25 | Stop reason: HOSPADM

## 2024-03-23 RX ORDER — INSULIN LISPRO 100 [IU]/ML
0-8 INJECTION, SOLUTION INTRAVENOUS; SUBCUTANEOUS
Status: DISCONTINUED | OUTPATIENT
Start: 2024-03-23 | End: 2024-03-25

## 2024-03-23 RX ORDER — METHOCARBAMOL 500 MG/1
500 TABLET, FILM COATED ORAL 3 TIMES DAILY PRN
Status: ON HOLD | COMMUNITY
End: 2024-03-25 | Stop reason: HOSPADM

## 2024-03-23 RX ORDER — SODIUM CHLORIDE 0.9 % (FLUSH) 0.9 %
5-40 SYRINGE (ML) INJECTION PRN
Status: DISCONTINUED | OUTPATIENT
Start: 2024-03-23 | End: 2024-03-25 | Stop reason: HOSPADM

## 2024-03-23 RX ORDER — DEXTROSE MONOHYDRATE 100 MG/ML
INJECTION, SOLUTION INTRAVENOUS CONTINUOUS PRN
Status: DISCONTINUED | OUTPATIENT
Start: 2024-03-23 | End: 2024-03-25 | Stop reason: HOSPADM

## 2024-03-23 RX ORDER — LEFLUNOMIDE 20 MG/1
20 TABLET ORAL DAILY
Status: DISCONTINUED | OUTPATIENT
Start: 2024-03-24 | End: 2024-03-25 | Stop reason: HOSPADM

## 2024-03-23 RX ORDER — ACETAMINOPHEN 325 MG/1
650 TABLET ORAL EVERY 6 HOURS PRN
Status: DISCONTINUED | OUTPATIENT
Start: 2024-03-23 | End: 2024-03-25 | Stop reason: HOSPADM

## 2024-03-23 RX ORDER — CLOPIDOGREL BISULFATE 75 MG/1
75 TABLET ORAL DAILY
Status: DISCONTINUED | OUTPATIENT
Start: 2024-03-24 | End: 2024-03-25 | Stop reason: HOSPADM

## 2024-03-23 RX ORDER — OXYCODONE HCL 20 MG/1
20 TABLET, FILM COATED, EXTENDED RELEASE ORAL EVERY 12 HOURS
Status: DISCONTINUED | OUTPATIENT
Start: 2024-03-23 | End: 2024-03-25 | Stop reason: HOSPADM

## 2024-03-23 RX ORDER — POLYETHYLENE GLYCOL 3350 17 G/17G
17 POWDER, FOR SOLUTION ORAL DAILY PRN
Status: DISCONTINUED | OUTPATIENT
Start: 2024-03-23 | End: 2024-03-25 | Stop reason: HOSPADM

## 2024-03-23 RX ADMIN — CEFTRIAXONE SODIUM 1000 MG: 1 INJECTION, POWDER, FOR SOLUTION INTRAMUSCULAR; INTRAVENOUS at 14:15

## 2024-03-23 RX ADMIN — SODIUM CHLORIDE 1000 ML: 9 INJECTION, SOLUTION INTRAVENOUS at 12:50

## 2024-03-23 RX ADMIN — SODIUM CHLORIDE, PRESERVATIVE FREE 10 ML: 5 INJECTION INTRAVENOUS at 21:18

## 2024-03-23 RX ADMIN — BREXPIPRAZOLE 2 MG: 0.5 TABLET ORAL at 22:29

## 2024-03-23 RX ADMIN — AZITHROMYCIN MONOHYDRATE 500 MG: 500 INJECTION, POWDER, LYOPHILIZED, FOR SOLUTION INTRAVENOUS at 14:49

## 2024-03-23 RX ADMIN — OXYCODONE HYDROCHLORIDE 20 MG: 20 TABLET, FILM COATED, EXTENDED RELEASE ORAL at 21:19

## 2024-03-23 RX ADMIN — LUBIPROSTONE 24 MCG: 24 CAPSULE, GELATIN COATED ORAL at 18:58

## 2024-03-23 ASSESSMENT — PAIN - FUNCTIONAL ASSESSMENT: PAIN_FUNCTIONAL_ASSESSMENT: 0-10

## 2024-03-23 ASSESSMENT — PAIN SCALES - GENERAL
PAINLEVEL_OUTOF10: 9
PAINLEVEL_OUTOF10: 5

## 2024-03-23 ASSESSMENT — PAIN DESCRIPTION - LOCATION
LOCATION: FLANK
LOCATION: HEAD

## 2024-03-23 ASSESSMENT — PAIN DESCRIPTION - PAIN TYPE: TYPE: ACUTE PAIN

## 2024-03-23 ASSESSMENT — PAIN DESCRIPTION - ORIENTATION: ORIENTATION: LEFT

## 2024-03-23 ASSESSMENT — PAIN DESCRIPTION - FREQUENCY: FREQUENCY: CONTINUOUS

## 2024-03-23 NOTE — H&P
HOSPITALISTS HISTORY AND PHYSICAL    3/23/2024 1:58 PM    Patient Information:  CROW BONNER is a 54 y.o. female [id number]  PCP:  Mariana Simental DO (Tel: [phone] )    Chief complaint:    Chief Complaint   Patient presents with    Urinary Retention     Reports no urine in the last 3 dys, worsening swelling in left arm/hand        History of Present Illness:  Crow Bonner is a 54 y.o. female who presented with Presents to ER with complaints of urinary retention worsening swelling of the left arm from.  Patient with complex history.  Just recently discharged last week CHF chronic kidney disease have been having trouble peeing.  Patient over the past 2 days has had significant weakness.  Unable to go to the bathroom.  With urinary retention.  No reported fevers chills now does have mild shortness of breath.  Nothing that makes it better or worse.  When I went to ER patient might be hypoxic worsening renal function  REVIEW OF SYSTEMS:   Constitutional: Negative for fever,chills or night sweats  ENT: Negative for rhinorrhea, epistaxis, hoarseness, sore throat.  Respiratory: Negative for shortness of breath,wheezing  Cardiovascular: Negative for chest pain, palpitations   Gastrointestinal: Negative for nausea, vomiting, diarrhea  Genitourinary: Negative for polyuria, dysuria   Hematologic/Lymphatic: Negative for bleeding tendency, easy bruising  Musculoskeletal: Negative for myalgias and arthralgias  Neurologic: Negative for confusion,dysarthria.  Skin: Negative for itching,rash, good capillary refill.   Psychiatric: Negative for depression,anxiety, agitation.  Endocrine: Negative for polydipsia,polyuria,heat /cold intolerance.    Past Medical History:   has a past medical history of Acute CVA (cerebrovascular accident) (HCC), Anxiety, Anxiety and depression,

## 2024-03-23 NOTE — ED NOTES
ED TO INPATIENT SBAR HANDOFF    Patient Name: Crow Bonner   :  1970  54 y.o.   MRN:  [id number]  Preferred Name    ED Room #:  ED-0006/06  Family/Caregiver Present yes   Restraints no   Sitter no   Sepsis Risk Score Sepsis Risk Score: 5.15    Situation  Code Status: Prior No additional code details.    Allergies: Iv dye [iodides], Other, Trazodone, Diazepam, and Trazodone and nefazodone  Weight: Patient Vitals for the past 96 hrs (Last 3 readings):   Weight   24 1103 92.3 kg (203 lb 8 oz)     Arrived from: home  Chief Complaint:   Chief Complaint   Patient presents with    Urinary Retention     Reports no urine in the last 3 dys, worsening swelling in left arm/hand     Hospital Problem/Diagnosis:  Principal Problem:    MARIA D (acute kidney injury) (HCC)  Resolved Problems:    * No resolved hospital problems. *    Imaging:   CT ABDOMEN PELVIS WO CONTRAST Additional Contrast? None   Final Result   1. Ground-glass airspace changes with central distribution in the lungs as   well as a more dense bandlike opacity in the lingula.  The overall pattern   may represent a viral pattern of pneumonia.  Pulmonary edema or atelectasis   may be considered.   2. Prior history of cholelithiasis not well characterized on current CT.   Gallbladder mucosal thickening and mild biliary dilatation appears new from   prior CT.  Correlate with any potential biliary symptoms.         CT CHEST WO CONTRAST   Final Result   1. Ground-glass airspace changes with central distribution in the lungs as   well as a more dense bandlike opacity in the lingula.  The overall pattern   may represent a viral pattern of pneumonia.  Pulmonary edema or atelectasis   may be considered.   2. Prior history of cholelithiasis not well characterized on current CT.   Gallbladder mucosal thickening and mild biliary dilatation appears new from   prior CT.  Correlate with any potential biliary symptoms.         XR CHEST PORTABLE   Final Result  Timeline found under the Summary Nursing Index tab.    Recommendation    Pending orders   Plan for Discharge (if known):   Additional Comments:    If any further questions, please call Sending RN at [phone]    Electronically signed by: Electronically signed by Jessica Mandujano RN on 3/23/2024 at 2:44 PM

## 2024-03-23 NOTE — ED PROVIDER NOTES
[facility] EMERGENCY DEPARTMENT  EMERGENCY DEPARTMENT ENCOUNTER        Pt Name: Crow Bonner  MRN: [id number]  Birthdate 1970  Date of evaluation: 3/23/2024  Provider: ALBERTO Malik  PCP: Mariana Simental DO  Note Started: 11:27 AM EDT 3/23/24       I have seen and evaluated this patient with my supervising physician Harman      CHIEF COMPLAINT       Chief Complaint   Patient presents with    Urinary Retention     Reports no urine in the last 3 dys, worsening swelling in left arm/hand       HISTORY OF PRESENT ILLNESS: 1 or more Elements     History From: Patient/Family  Limitations to history : None    Crow Bonner is a 54 y.o. female with past medical history of previous breast cancer with chronic lymphedema to the left upper extremity, CHF, CAD, fibromyalgia, hyperlipidemia, diabetes, obstructive sleep apnea who presents ED with complaint of decreased urine output.  She reports she has not had any urine output for the past 3 days.  States she feels like she needs to go the bathroom but when she attempts to go nothing comes out.  Has not had any urine output for the past 3 days.  Family is concerned that she is potentially in kidney failure.  She denies any abdominal pain or abdominal distention.  Denies any abdominal bloating.  Does report worsened swelling to the left upper extremity and bilateral lower extremities.  Denies any swelling to the right upper extremity.  Denies chest pain or shortness of breath.  Denies cough.  Denies nausea or vomiting.  Denies changes in bowel movements.  Became concerned and came with family to the ED for further evaluation treatment.  Reports she is on diuretic at home with Demadex.  She takes Demadex 100 mg daily twice daily per patient. Upon review of records it also appears that she is on spironolactone 50 mg twice daily.    Nursing Notes were all reviewed and agreed with or any disagreements were addressed in the HPI.    REVIEW OF  92.3 kg (203 lb 8 oz)    Height: 1.626 m (5' 4\")        Patient was given the following medications:  Medications   cefTRIAXone (ROCEPHIN) 1,000 mg in sodium chloride 0.9 % 50 mL IVPB (mini-bag) (has no administration in time range)   azithromycin (ZITHROMAX) 500 mg in sodium chloride 0.9 % 250 mL IVPB ([id number]) (has no administration in time range)   sodium chloride 0.9 % bolus 1,000 mL (1,000 mLs IntraVENous New Bag 3/23/24 1250)             Is this patient to be included in the SEP-1 Core Measure due to severe sepsis or septic shock?   No   Exclusion criteria - the patient is NOT to be included for SEP-1 Core Measure due to:  Infection is not suspected    Chronic Conditions affecting care:   has a past medical history of Acute CVA (cerebrovascular accident) ([facility]) (03/09/2023), Anxiety, Anxiety and depression, Arthritis, Asthma, CAD (coronary artery disease), Cancer ([facility]) (2007), Cerebral artery occlusion with cerebral infarction ([facility]), CHF (congestive heart failure) ([facility]) (2018), Chronic kidney disease, Chronic pain, Constipation, COPD (chronic obstructive pulmonary disease) ([facility]), Depression, Diabetes mellitus ([facility]), Diabetic polyneuropathy associated with type 2 diabetes mellitus ([facility]) (02/02/2018), Dysthymia (02/02/2018), ESBL (extended spectrum beta-lactamase) producing bacteria infection (03/14/2018), Fibromyalgia, Gastric ulcer, unspecified as acute or chronic, without mention of hemorrhage, perforation, or obstruction, GERD (gastroesophageal reflux disease), Gout, HIGH CHOLESTEROL, Hypertension, Hypothyroidism, Pneumonia (03/25/2020), Pyelonephritis, Severe persistent asthma without complication (02/02/2018), Thyroid disease, and TIA (transient ischemic attack).    CONSULTS: (Who and What was discussed)  None      Social Determinants Significantly Affecting Health : None    Records Reviewed (External and Source) Prev Admit Notes    CC/HPI Summary, DDx, ED Course, and Reassessment: 54-year-old female with

## 2024-03-23 NOTE — ED PROVIDER NOTES
I have personally performed a face to face diagnostic evaluation on this patient. I have fully participated in the care of this patient I personally saw the patient and performed a substantive portion of the visit including all aspects of the medical decision making.  I have reviewed and agree with all pertinent clinical information including history, physical exam, diagnostic tests, and the plan.      HPI: Crow Bonner presented with decreased urine output for the last 3 days.  Just been feeling tired.  Feels like she is going to the bathroom but nothing comes out.  Is not had any urine output for the last 3 days.  Family is concerned about potential kidney failure.  No abdominal pain.  No abdominal bloating history of chronic lymphedema in the left upper extremity, CHF, CAD, prior myalgia, hyperlipidemia, diabetes.  No other associated symptoms.  She takes Demadex 100 mg.  Also on spironolactone.  Chief Complaint   Patient presents with    Urinary Retention     Reports no urine in the last 3 dys, worsening swelling in left arm/hand      Review of Systems: See JEFF note  Vital Signs: BP (!) 96/57   Pulse 78   Temp 97.6 °F (36.4 °C) (Oral)   Resp 12   Ht 1.626 m (5' 4\")   Wt 92.3 kg (203 lb 8 oz)   SpO2 98%   BMI 34.93 kg/m²     Alert 54 y.o. female who does not appear toxic or acutely ill  HENT: Atraumatic, oral mucosa moist  Neck: Grossly normal ROM  Chest/Lungs: respiratory effort normal   Abdomen: Soft nontender  Extremities: 2+ pitting edema with bilateral lower extremities, large amount of pitting edema to the left upper extremity as compared to the right  Musculoskeletal: Grossly normal ROM  Skin: No palor or diaphoresis    Medical Decision Making and Plan:  Pertinent Labs & Imaging studies reviewed. (See JEFF chart for details)  I agree with JEFF assessment and plan.  54-year-old female with previous history of breast cancer and chronic lymphedema to the left upper extremity presents for decreased  urine output found to be in acute renal failure creatinine of 4.3 potassium is within normal limits.  Will give IV fluids 1 L with normal saline.  Troponin is mildly elevated lactic acid is normal.  Chest x-ray shows atelectasis versus infiltrate as well as confirmed on CT scan no significant intra-abdominal findings to show patient signs of urinary obstruction.  Will give patient antibiotics for possible pneumonia.  Nephrology was consulted I personally spoke to nephrology at bedside.  Will plan on admission at this time.  See JEFF note for further details.    I personally saw the patient and independently provided 12 minutes of nonconcurrent critical care out of the total shared critical care time provided    This includes multiple reevaluations, vital sign monitoring, pulse oximetry monitoring, telemetry monitoring, clinical response to the IV medications, reviewing the nursing notes, consultation time, dictation/documentation time, and interpretation of the labwork. (This time excludes time spent performing procedures).      EKG: All EKG's are interpreted by the Emergency Department Physician who either signs or Co-signs this chart in the absence of a cardiologist.    EKG Interpretation    Interpreted by emergency department physician    Rhythm: normal sinus   Rate: normal  Axis: normal  Ectopy: none  Conduction: normal  ST Segments: normal  T Waves: normal  Q Waves: none    Clinical Impression: Normal sinus rhythm, no significant T wave or ST changes.  Normal NV interval, normal QRS duration, normal QT QTC.  Normal axis.  No arrhythmia or signs of ischemia.  Otherwise normal EKG. Interpreted by myself.            Bal Hammer MD  03/23/24 [id number]

## 2024-03-23 NOTE — PROGRESS NOTES
Pharmacy Home Medication Reconciliation Note    A medication reconciliation has been completed for Crow Bonner 1970    Pharmacy: [facility] [address] [place], [place]  Information provided by: patient and     The patient's home medication list is as follows:  Current Facility-Administered Medications on File Prior to Encounter   Medication Dose Route Frequency Provider Last Rate Last Admin    Tirzepatide (MOUNJARO) SC injection 2.5 mg  2.5 mg SubCUTAneous Weekly Lizzy Taylor MD         Current Outpatient Medications on File Prior to Encounter   Medication Sig Dispense Refill    methocarbamol (ROBAXIN) 500 MG tablet Take 1 tablet by mouth 3 times daily as needed      spironolactone (ALDACTONE) 25 MG tablet Take 2 tablets by mouth 2 times daily 360 tablet 3    torsemide (DEMADEX) 100 MG tablet Take 1 tablet by mouth daily (Patient taking differently: Take 1 tablet by mouth 2 times daily) 30 tablet 3    guaiFENesin (MUCINEX) 600 MG extended release tablet Take 1 tablet by mouth 2 times daily 60 tablet 0    torsemide (DEMADEX) 10 MG tablet Take 5 tablets by mouth every evening (Patient not taking: Reported on 3/23/2024) 450 tablet 0    empagliflozin (JARDIANCE) 10 MG tablet Take 1 tablet by mouth daily 30 tablet 0    sacubitril-valsartan (ENTRESTO) 24-26 MG per tablet Take 0.5 tablets by mouth 2 times daily 30 tablet 0    montelukast (SINGULAIR) 10 MG tablet TAKE 1 TABLET BY MOUTH AT NIGHT (Patient taking differently: Take 1 tablet by mouth nightly) 30 tablet 0    LEVEMIR FLEXPEN 100 UNIT/ML injection pen Inject 200 units once daily (Patient taking differently: Inject 200 Units into the skin nightly Inject 200 units once daily) 20 Adjustable Dose Pre-filled Pen Syringe 3    Semaglutide,0.25 or 0.5MG/DOS, (OZEMPIC, 0.25 OR 0.5 MG/DOSE,) 2 MG/3ML SOPN Inject 0.25 mg once a week for 4 weeks and then increase to 0.5 mg once a week (Patient not taking: Reported on 3/23/2024) 3 mL 1    vitamin  (ROBAXIN-750) 750 MG tablet Take 1 tablet by mouth 3 times daily as needed (pain and spasm) (Patient not taking: Reported on 3/23/2024) 30 tablet 0    loratadine (CLARITIN) 10 MG tablet TAKE 1 TABLET BY MOUTH ONE TIME A DAY  (Patient taking differently: Take 1 tablet by mouth daily) 30 tablet 5    OXYGEN Inhale 2 L into the lungs nightly Sometimes with activity      oxyCODONE (OXYCONTIN) 20 MG extended release tablet Take 1 tablet by mouth in the morning and 1 tablet in the evening.      aspirin 81 MG EC tablet Take 1 tablet by mouth daily      benztropine (COGENTIN) 2 MG tablet Take 1 tablet by mouth nightly      oxyCODONE-acetaminophen (PERCOCET)  MG per tablet Take 1 tablet by mouth every 4 hours as needed for Pain . Earliest Fill Date: 6/8/17 (Patient not taking: Reported on 3/23/2024) 100 tablet 0    duloxetine (CYMBALTA) 60 MG capsule Take 1 capsule by mouth 2 times daily         Patient is no longer taking Trulicity, Ozempic, Linzess, Percocet, or Miralax.    Of note, patient takes Leflunomide 20 mg daily: patient took all AM meds prior to ED arrival.    Timing of last doses updated.    Thank you,  Irma James, SANDRAhT

## 2024-03-23 NOTE — CONSULTS
route daily On am/off HS. 1/4/23   Mariana Simental DO   Urea 40 % LOTN Apply to feet nightly and cover with Aquaphor 1/4/23   Mariana Simental DO   SM SENNA-S 8.6-50 MG per tablet TAKE 2 TABLETS BY MOUTH TWO TIMES A DAY 9/6/22   Mariana Simental DO   Continuous Blood Gluc Sensor (FREESTYLE EMERALD 2 SENSOR) MISC Change every 14 days 7/27/22   Lizzy Taylor MD   Blood Glucose Monitoring Suppl (ONETOUCH VERIO) w/Device KIT Use to check glucose 4 times daily. 7/12/22   Lizzy Taylor MD   Lidocaine 5 % CREA Apply to feet QID prn pain for peripheral neuropathy 6/21/22   Mariana Simental DO   Compression Stockings MISC by Does not apply route Zippered stockings for daily use on lower extremities (do not wear at night). 20-30mmHg. 4/20/22   Jessa Parsons MD   Continuous Blood Gluc  (FREESTYLE EMERALD 2 READER) MINDY To check glucose levels 3/15/22   Lizzy Taylor MD   albuterol (PROVENTIL) (2.5 MG/3ML) 0.083% nebulizer solution Take 3 mLs by nebulization every 6 hours as needed for Wheezing or Shortness of Breath 1/26/22   Mariana Simental DO   LINZESS 290 MCG CAPS capsule TAKE 1 CAPSULE BY MOUTH ONE TIME A DAY FOR 90 DAYS 6/25/21   Summer Schneider MD   brexpiprazole (REXULTI) 4 MG TABS tablet Take 0.5 tablets by mouth at bedtime 7/21/21   Summer Schneider MD   methocarbamol (ROBAXIN-750) 750 MG tablet Take 1 tablet by mouth 3 times daily as needed (pain and spasm) 7/26/21   Marcia Shine MD   loratadine (CLARITIN) 10 MG tablet TAKE 1 TABLET BY MOUTH ONE TIME A DAY  10/5/20   Mariana Simental DO   OXYGEN Inhale 2 L into the lungs nightly Sometimes with activity    Summer Schneider MD   oxyCODONE (OXYCONTIN) 20 MG extended release tablet Take 1 tablet by mouth in the morning and 1 tablet in the evening.    Summer Schneider MD   aspirin 81 MG EC tablet Take 1 tablet by mouth daily    Provider, MD Summer   benztropine (COGENTIN) 1 MG tablet Take 1 tablet by mouth nightly     +------------------------+----------+---------------+----------+ Right Doppler Measurements +------------------------+---------+------+------------+ !Location                !Signal   !Reflux!Reflux (sec)! +------------------------+---------+------+------------+ !Sapheno Femoral Junction!Pulsatile!No    !            ! +------------------------+---------+------+------------+ !Common Femoral          !Pulsatile!No    !            ! +------------------------+---------+------+------------+ !Femoral                 !Phasic   !      !            ! +------------------------+---------+------+------------+ !Deep Femoral            !Phasic   !No    !            ! +------------------------+---------+------+------------+ !Popliteal               !Pulsatile!No    !            ! +------------------------+---------+------+------------+ Left Lower Extremities DVT Study Measurements Left 2D Measurements +------------------------+----------+---------------+----------+ !Location                !Visualized!Compressibility!Thrombosis! +------------------------+----------+---------------+----------+ !Sapheno Femoral Junction!Yes       !Yes            !None      ! +------------------------+----------+---------------+----------+ !Common Femoral          !Yes       !Yes            !None      ! +------------------------+----------+---------------+----------+ Left Doppler Measurements +--------------+---------+------+------------+ !Location      !Signal   !Reflux!Reflux (sec)! +--------------+---------+------+------------+ !Common Femoral!Pulsatile!No    !            ! +--------------+---------+------+------------+    XR CHEST PORTABLE    Result Date: 3/6/2024  EXAMINATION: ONE XRAY VIEW OF THE CHEST 3/6/2024 5:18 pm COMPARISON: 04/11/2023 HISTORY: ORDERING SYSTEM PROVIDED HISTORY: SOB TECHNOLOGIST PROVIDED HISTORY: Reason for exam:->SOB Reason for Exam: sob FINDINGS: There is a port in place with distal tip overlying the superior vena cava.

## 2024-03-24 ENCOUNTER — APPOINTMENT (OUTPATIENT)
Dept: GENERAL RADIOLOGY | Age: 54
DRG: 469 | End: 2024-03-24
Payer: COMMERCIAL

## 2024-03-24 LAB
ANA SER QL IA: NEGATIVE
ANION GAP SERPL CALCULATED.3IONS-SCNC: 12 MMOL/L (ref 3–16)
BASOPHILS # BLD: 0 K/UL (ref 0–0.2)
BASOPHILS NFR BLD: 0.3 %
BILIRUB UR QL STRIP.AUTO: NEGATIVE
BUN SERPL-MCNC: 51 MG/DL (ref 7–20)
C3 SERPL-MCNC: 172.8 MG/DL (ref 90–180)
C4 SERPL-MCNC: 40.1 MG/DL (ref 10–40)
CALCIUM SERPL-MCNC: 7.8 MG/DL (ref 8.3–10.6)
CHLORIDE SERPL-SCNC: 99 MMOL/L (ref 99–110)
CLARITY UR: CLEAR
CO2 SERPL-SCNC: 24 MMOL/L (ref 21–32)
COLOR UR: YELLOW
CREAT SERPL-MCNC: 2.7 MG/DL (ref 0.6–1.1)
DEPRECATED RDW RBC AUTO: 14.3 % (ref 12.4–15.4)
EKG ATRIAL RATE: 84 BPM
EKG DIAGNOSIS: NORMAL
EKG P AXIS: 42 DEGREES
EKG P-R INTERVAL: 142 MS
EKG Q-T INTERVAL: 384 MS
EKG QRS DURATION: 82 MS
EKG QTC CALCULATION (BAZETT): 453 MS
EKG R AXIS: 46 DEGREES
EKG T AXIS: 47 DEGREES
EKG VENTRICULAR RATE: 84 BPM
EOSINOPHIL # BLD: 0.2 K/UL (ref 0–0.6)
EOSINOPHIL NFR BLD: 2 %
EST. AVERAGE GLUCOSE BLD GHB EST-MCNC: 171.4 MG/DL
GFR SERPLBLD CREATININE-BSD FMLA CKD-EPI: 20 ML/MIN/{1.73_M2}
GLUCOSE BLD-MCNC: 245 MG/DL (ref 70–99)
GLUCOSE BLD-MCNC: 283 MG/DL (ref 70–99)
GLUCOSE BLD-MCNC: 299 MG/DL (ref 70–99)
GLUCOSE SERPL-MCNC: 104 MG/DL (ref 70–99)
GLUCOSE UR STRIP.AUTO-MCNC: NEGATIVE MG/DL
HBA1C MFR BLD: 7.6 %
HCT VFR BLD AUTO: 28.6 % (ref 36–48)
HGB BLD-MCNC: 9.2 G/DL (ref 12–16)
HGB UR QL STRIP.AUTO: NEGATIVE
KETONES UR STRIP.AUTO-MCNC: NEGATIVE MG/DL
LEUKOCYTE ESTERASE UR QL STRIP.AUTO: NEGATIVE
LYMPHOCYTES # BLD: 2.2 K/UL (ref 1–5.1)
LYMPHOCYTES NFR BLD: 22.5 %
MCH RBC QN AUTO: 28 PG (ref 26–34)
MCHC RBC AUTO-ENTMCNC: 32.2 G/DL (ref 31–36)
MCV RBC AUTO: 87 FL (ref 80–100)
MONOCYTES # BLD: 0.8 K/UL (ref 0–1.3)
MONOCYTES NFR BLD: 8.4 %
NEUTROPHILS # BLD: 6.5 K/UL (ref 1.7–7.7)
NEUTROPHILS NFR BLD: 66.8 %
NITRITE UR QL STRIP.AUTO: NEGATIVE
PERFORMED ON: ABNORMAL
PH UR STRIP.AUTO: 5 [PH] (ref 5–8)
PLATELET # BLD AUTO: 282 K/UL (ref 135–450)
PMV BLD AUTO: 9.2 FL (ref 5–10.5)
POTASSIUM SERPL-SCNC: 3.8 MMOL/L (ref 3.5–5.1)
PROT UR STRIP.AUTO-MCNC: NEGATIVE MG/DL
RBC # BLD AUTO: 3.29 M/UL (ref 4–5.2)
SODIUM SERPL-SCNC: 135 MMOL/L (ref 136–145)
SP GR UR STRIP.AUTO: 1.02 (ref 1–1.03)
UA COMPLETE W REFLEX CULTURE PNL UR: NORMAL
UA DIPSTICK W REFLEX MICRO PNL UR: NORMAL
URATE SERPL-MCNC: 9.9 MG/DL (ref 2.6–6)
URN SPEC COLLECT METH UR: NORMAL
UROBILINOGEN UR STRIP-ACNC: 0.2 E.U./DL
WBC # BLD AUTO: 9.7 K/UL (ref 4–11)

## 2024-03-24 PROCEDURE — 2700000000 HC OXYGEN THERAPY PER DAY

## 2024-03-24 PROCEDURE — 6360000002 HC RX W HCPCS: Performed by: HOSPITALIST

## 2024-03-24 PROCEDURE — 94640 AIRWAY INHALATION TREATMENT: CPT

## 2024-03-24 PROCEDURE — 36415 COLL VENOUS BLD VENIPUNCTURE: CPT

## 2024-03-24 PROCEDURE — 36592 COLLECT BLOOD FROM PICC: CPT

## 2024-03-24 PROCEDURE — 94760 N-INVAS EAR/PLS OXIMETRY 1: CPT

## 2024-03-24 PROCEDURE — 93010 ELECTROCARDIOGRAM REPORT: CPT | Performed by: INTERNAL MEDICINE

## 2024-03-24 PROCEDURE — 1200000000 HC SEMI PRIVATE

## 2024-03-24 PROCEDURE — 85025 COMPLETE CBC W/AUTO DIFF WBC: CPT

## 2024-03-24 PROCEDURE — 80048 BASIC METABOLIC PNL TOTAL CA: CPT

## 2024-03-24 PROCEDURE — 84550 ASSAY OF BLOOD/URIC ACID: CPT

## 2024-03-24 PROCEDURE — 6370000000 HC RX 637 (ALT 250 FOR IP): Performed by: HOSPITALIST

## 2024-03-24 PROCEDURE — 2580000003 HC RX 258: Performed by: HOSPITALIST

## 2024-03-24 PROCEDURE — 71045 X-RAY EXAM CHEST 1 VIEW: CPT

## 2024-03-24 RX ORDER — HEPARIN SODIUM 5000 [USP'U]/ML
5000 INJECTION, SOLUTION INTRAVENOUS; SUBCUTANEOUS EVERY 8 HOURS SCHEDULED
Status: DISCONTINUED | OUTPATIENT
Start: 2024-03-24 | End: 2024-03-25 | Stop reason: HOSPADM

## 2024-03-24 RX ADMIN — SODIUM CHLORIDE, PRESERVATIVE FREE 10 ML: 5 INJECTION INTRAVENOUS at 09:41

## 2024-03-24 RX ADMIN — LEFLUNOMIDE 20 MG: 20 TABLET ORAL at 09:40

## 2024-03-24 RX ADMIN — METOPROLOL TARTRATE 25 MG: 25 TABLET, FILM COATED ORAL at 09:40

## 2024-03-24 RX ADMIN — LEVOTHYROXINE SODIUM 150 MCG: 0.07 TABLET ORAL at 05:15

## 2024-03-24 RX ADMIN — OXYCODONE HYDROCHLORIDE 20 MG: 20 TABLET, FILM COATED, EXTENDED RELEASE ORAL at 09:40

## 2024-03-24 RX ADMIN — INSULIN GLARGINE 100 UNITS: 100 INJECTION, SOLUTION SUBCUTANEOUS at 20:47

## 2024-03-24 RX ADMIN — INSULIN GLARGINE 100 UNITS: 100 INJECTION, SOLUTION SUBCUTANEOUS at 00:06

## 2024-03-24 RX ADMIN — ACETAMINOPHEN 650 MG: 325 TABLET ORAL at 20:35

## 2024-03-24 RX ADMIN — INSULIN LISPRO 2 UNITS: 100 INJECTION, SOLUTION INTRAVENOUS; SUBCUTANEOUS at 12:02

## 2024-03-24 RX ADMIN — CLOPIDOGREL BISULFATE 75 MG: 75 TABLET ORAL at 09:40

## 2024-03-24 RX ADMIN — LUBIPROSTONE 24 MCG: 24 CAPSULE, GELATIN COATED ORAL at 09:40

## 2024-03-24 RX ADMIN — BREXPIPRAZOLE 2 MG: 0.5 TABLET ORAL at 22:13

## 2024-03-24 RX ADMIN — INSULIN LISPRO 4 UNITS: 100 INJECTION, SOLUTION INTRAVENOUS; SUBCUTANEOUS at 17:19

## 2024-03-24 RX ADMIN — ENOXAPARIN SODIUM 30 MG: 100 INJECTION SUBCUTANEOUS at 09:41

## 2024-03-24 RX ADMIN — LUBIPROSTONE 24 MCG: 24 CAPSULE, GELATIN COATED ORAL at 17:19

## 2024-03-24 RX ADMIN — ALBUTEROL SULFATE 2.5 MG: 2.5 SOLUTION RESPIRATORY (INHALATION) at 23:11

## 2024-03-24 RX ADMIN — HEPARIN SODIUM 5000 UNITS: 5000 INJECTION INTRAVENOUS; SUBCUTANEOUS at 14:58

## 2024-03-24 RX ADMIN — METOPROLOL TARTRATE 25 MG: 25 TABLET, FILM COATED ORAL at 20:35

## 2024-03-24 RX ADMIN — SODIUM CHLORIDE, PRESERVATIVE FREE 10 ML: 5 INJECTION INTRAVENOUS at 20:48

## 2024-03-24 RX ADMIN — HEPARIN SODIUM 5000 UNITS: 5000 INJECTION INTRAVENOUS; SUBCUTANEOUS at 22:13

## 2024-03-24 RX ADMIN — OXYCODONE HYDROCHLORIDE 20 MG: 20 TABLET, FILM COATED, EXTENDED RELEASE ORAL at 20:35

## 2024-03-24 ASSESSMENT — PAIN SCALES - GENERAL: PAINLEVEL_OUTOF10: 5

## 2024-03-24 ASSESSMENT — PAIN DESCRIPTION - LOCATION: LOCATION: HEAD

## 2024-03-24 NOTE — PROGRESS NOTES
Shift assessment completed. Neuro WNL. Denies any pain at this time. AM meds administered per MAR. The care plan and education has been reviewed and mutually agreed upon with the patient. Standard safety measures in place.

## 2024-03-24 NOTE — PROGRESS NOTES
lobe.  There is no pleural effusion or pneumothorax. Soft Tissues/Bones: Chronic fracture deformities of the left posterior 3rd through 9th ribs are noted.  Additional chronic left-sided rib fractures are noted.  There is fluid and foci of air within the right shoulder joint (series 2 images 419).  Right chest wall Port-A-Cath, with its tip in the SVC. Abdomen/Pelvis: Organs: The liver is enlarged.  No acute abnormality of the spleen, pancreas, or adrenal glands is identified.  The gallbladder is distended, with fluid layering small stones or sludge.  The CBD is dilated, measuring up to 7-8 mm. The kidneys are normal in size.  No hydronephrosis or renal calculi. GI/Bowel: The stomach is distended with air.  No evidence of bowel obstruction.  No focal inflammatory changes are identified.  Increased stool is noted throughout the colon.  Normal caliber appendix. Pelvis: There is a Carmona catheter in place.  Associated foci of air noted within the bladder.  Status post hysterectomy. Peritoneum/Retroperitoneum: No evidence of intraperitoneal free air or ascites.  There are scattered small retroperitoneal lymph nodes, nonspecific. Scattered aortic atherosclerosis, without evidence of aneurysm. Bones/Soft Tissues: No acute osseous abnormality is identified.  There is left lower abdominal wall fat stranding/edema (series 2, image 22).     1. Endotracheal tube tip within the right mainstem bronchus.  Repositioning is recommended. 2. Nodular consolidations noted throughout both lungs, most notably within the right upper lobe, concerning for multifocal pneumonia.  Follow-up to resolution. 3. Scattered foci of air and fluid noted within the right shoulder joint. These findings may be secondary to infection or recent joint injection.  No underlying osseous abnormality is identified.  Orthopedic follow-up is recommended. 4. Dilated gallbladder with small layering stones or sludge.  If there is concern for acute cholecystitis,      !Yes       !Yes            !None      ! +------------------------+----------+---------------+----------+ !Prox Femoral            !Yes       !Yes            !None      ! +------------------------+----------+---------------+----------+ !Mid Femoral             !Yes       !Yes            !None      ! +------------------------+----------+---------------+----------+ !Dist Femoral            !Yes       !Yes            !None      ! +------------------------+----------+---------------+----------+ !Deep Femoral            !Yes       !Yes            !None      ! +------------------------+----------+---------------+----------+ !Popliteal               !Yes       !Yes            !None      ! +------------------------+----------+---------------+----------+ !GSV Below Knee          !Yes       !Yes            !None      ! +------------------------+----------+---------------+----------+ !Gastroc                 !Yes       !Yes            !None      ! +------------------------+----------+---------------+----------+ !Soleal                  !Yes       !Yes            !None      ! +------------------------+----------+---------------+----------+ !PTV                     !Yes       !Yes            !None      ! +------------------------+----------+---------------+----------+ !Peroneal                !Yes       !Yes            !None      ! +------------------------+----------+---------------+----------+ !GSV Calf                !Yes       !Yes            !None      ! +------------------------+----------+---------------+----------+ !SSV                     !Yes       !Yes            !None      ! +------------------------+----------+---------------+----------+ Right Doppler Measurements +------------------------+---------+------+------------+ !Location                !Signal   !Reflux!Reflux (sec)! +------------------------+---------+------+------------+ !Sapheno Femoral Junction!Pulsatile!No    !            !

## 2024-03-24 NOTE — PROGRESS NOTES
Recently admitted with hypoxia, went to eye doctor without wearing oxygen, became tired, lethargic and passed out. Transported to ED and found to be septic and positive for pneumonia. Patient was intubated and extubated on 03/15/24. Readmitted yesterday due to urinary retention, patient has not voided in 3 days. Creatinine elevated, generalized swelling. Worsening lymphedema left arm due to breast cancer, arm wrapped with ace wrap. Carmona in place draining, O2 2.5L at baseline, on O2 2L per NC.  VSS alert and oriented X4, speaks little English, speaks Amharic, will need . Hx of CHF, CAD and Diabetes, BS WDL. Patient preferred female nurse for [employer] reasons. Contact guard assist.  All night meds administered and safety precautions in place.    The care plan and education has been reviewed and mutually agreed upon with the patient.

## 2024-03-24 NOTE — PLAN OF CARE
Problem: Discharge Planning  Goal: Discharge to home or other facility with appropriate resources  Recent Flowsheet Documentation  Taken 3/24/2024 0930 by Maria M Patel RN  Discharge to home or other facility with appropriate resources:   Identify barriers to discharge with patient and caregiver   Refer to discharge planning if patient needs post-hospital services based on physician order or complex needs related to functional status, cognitive ability or social support system  3/23/2024 2321 by Marquita eRyes RN  Outcome: Progressing     Problem: Pain  Goal: Verbalizes/displays adequate comfort level or baseline comfort level  Recent Flowsheet Documentation  Taken 3/24/2024 0930 by Maria M Patel RN  Verbalizes/displays adequate comfort level or baseline comfort level:   Encourage patient to monitor pain and request assistance   Assess pain using appropriate pain scale   Administer analgesics based on type and severity of pain and evaluate response  3/23/2024 2321 by Marquita Reyes RN  Outcome: Progressing     Problem: Safety - Adult  Goal: Free from fall injury  3/23/2024 2321 by Marquita Reyes RN  Outcome: Progressing     Problem: ABCDS Injury Assessment  Goal: Absence of physical injury  3/23/2024 2321 by Marquita Reyes RN  Outcome: Progressing     Problem: Skin/Tissue Integrity  Goal: Absence of new skin breakdown  Description: 1.  Monitor for areas of redness and/or skin breakdown  2.  Assess vascular access sites hourly  3.  Every 4-6 hours minimum:  Change oxygen saturation probe site  4.  Every 4-6 hours:  If on nasal continuous positive airway pressure, respiratory therapy assess nares and determine need for appliance change or resting period.  3/23/2024 2321 by Marquita Reyes RN  Outcome: Progressing     Problem: Respiratory - Adult  Goal: Achieves optimal ventilation and oxygenation  Outcome: Progressing  Flowsheets (Taken 3/24/2024 0930)  Achieves optimal

## 2024-03-24 NOTE — PROGRESS NOTES
V2.0    [facility] Progress Note      Name:  Crow Bonner /Age/Sex: 1970  (54 y.o. female)   MRN & CSN:  [id number] & [id number] Encounter Date/Time: 3/24/2024 12:56 PM EDT   Location:  [facility]4469/4469-01 PCP: Mariana Simental DO     Attending:Phill Doss MD       Hospital Day: 2    Assessment and Recommendations   Crow Bonner is a 54 y.o. female who presents with MARIA D (acute kidney injury) (HCC)      Plan:   Acute on chronic kidney disease  Likely secondary obstruction.  With underlying chronic kidney disease and cardiac meds.  Creatinine slowly improved.  Carmona catheter in place.  Continue further recommendation from nephrology.  Will defer starting diuretics to nephrology for now.    Leukocytosis resolved.  I will discontinue her antibiotics given normal procalcitonin.    Chronic CHF.  Checks x-ray with some pulmonary congestion could be chronic continue to trend    Hypertension  Diabetes still poorly contrilled   Adjust insulin         Diet ADULT DIET; Regular; 5 carb choices (75 gm/meal); No Pork   DVT Prophylaxis    Code Status Full Code   Disposition          Personally reviewed Lab Studies and Imaging         Subjective:     Chief Complaint:     Crow Bonner is a 54 y.o. female who presents with Patient still with significant swelling.  Shortness of breath is much improved.  Still needing some oxygen.        Review of Systems:      Pertinent positives and negatives discussed in HPI    Objective:     Intake/Output Summary (Last 24 hours) at 3/24/2024 1256  Last data filed at 3/24/2024 0930  Gross per 24 hour   Intake 1660 ml   Output 1650 ml   Net 10 ml      Vitals:   Vitals:    24 0400 24 0608 24 0930 24 1010   BP: 110/71  117/72    Pulse: 78  72    Resp: 18  18 18   Temp: 98 °F (36.7 °C)  97.5 °F (36.4 °C)    TempSrc: Oral  Oral    SpO2: 97%  98%    Weight:  92.3 kg (203 lb 7.8 oz)     Height:             Physical Exam:      General: NAD  Eyes: EOMI  ENT: neck  thyroid lobe is not visualized. Right thyroid appears normal.  There is diffuse mucosal thickening of the esophagus. Lungs/pleura: The airways are patent.  No pleural fluid.  Mild pattern of ground-glass attenuation centrally in the lungs with a bandlike opacity also demonstrated in the lingula.  Peribronchial thickening is also noted.  No suspicious nodule. Soft Tissues/Bones: No skeletal abnormalities are appreciated within the chest. Abdomen/Pelvis: Organs: No evidence of cholelithiasis.  Sludge and stones seen on prior exam not well seen on current study.  There is diffuse gallbladder mucosal thickening.  No pericholecystic fluid.  Mild biliary dilatation with CBD measuring 9 mm.  The liver, pancreas and spleen are normal.  Kidneys and adrenal glands are normal. GI/Bowel: The stomach, duodenum and small bowel are normal. A normal appendix is visualized. The colon is normal. Pelvis: The bladder is unremarkable. Prior hysterectomy. Peritoneum/Retroperitoneum: The aorta tapers normally.  No lymph node enlargement. Bones/Soft Tissues: No significant skeletal abnormalities.     1. Ground-glass airspace changes with central distribution in the lungs as well as a more dense bandlike opacity in the lingula.  The overall pattern may represent a viral pattern of pneumonia.  Pulmonary edema or atelectasis may be considered. 2. Prior history of cholelithiasis not well characterized on current CT. Gallbladder mucosal thickening and mild biliary dilatation appears new from prior CT.  Correlate with any potential biliary symptoms.     XR CHEST PORTABLE    Result Date: 3/23/2024  EXAMINATION: ONE XRAY VIEW OF THE CHEST 3/23/2024 11:28 am COMPARISON: 03/17/2024 HISTORY: ORDERING SYSTEM PROVIDED HISTORY: edema TECHNOLOGIST PROVIDED HISTORY: Reason for exam:->edema Reason for Exam: Urinary Retention (Reports no urine in the last 3 dys, worsening swelling in left arm/hand) FINDINGS: There is some platelike atelectasis or infiltrate

## 2024-03-25 ENCOUNTER — TELEPHONE (OUTPATIENT)
Dept: CARDIOLOGY CLINIC | Age: 54
End: 2024-03-25

## 2024-03-25 ENCOUNTER — APPOINTMENT (OUTPATIENT)
Dept: GENERAL RADIOLOGY | Age: 54
DRG: 469 | End: 2024-03-25
Payer: COMMERCIAL

## 2024-03-25 VITALS
DIASTOLIC BLOOD PRESSURE: 82 MMHG | RESPIRATION RATE: 18 BRPM | WEIGHT: 205.69 LBS | HEART RATE: 73 BPM | OXYGEN SATURATION: 98 % | SYSTOLIC BLOOD PRESSURE: 133 MMHG | HEIGHT: 64 IN | TEMPERATURE: 98.3 F | BODY MASS INDEX: 35.12 KG/M2

## 2024-03-25 LAB
ANION GAP SERPL CALCULATED.3IONS-SCNC: 7 MMOL/L (ref 3–16)
BASOPHILS # BLD: 0.1 K/UL (ref 0–0.2)
BASOPHILS NFR BLD: 0.9 %
BUN SERPL-MCNC: 39 MG/DL (ref 7–20)
CALCIUM SERPL-MCNC: 8.5 MG/DL (ref 8.3–10.6)
CHLORIDE SERPL-SCNC: 100 MMOL/L (ref 99–110)
CO2 SERPL-SCNC: 29 MMOL/L (ref 21–32)
CREAT SERPL-MCNC: 1.4 MG/DL (ref 0.6–1.1)
DEPRECATED RDW RBC AUTO: 14.3 % (ref 12.4–15.4)
EOSINOPHIL # BLD: 0.2 K/UL (ref 0–0.6)
EOSINOPHIL NFR BLD: 2.3 %
GFR SERPLBLD CREATININE-BSD FMLA CKD-EPI: 44 ML/MIN/{1.73_M2}
GLUCOSE BLD-MCNC: 135 MG/DL (ref 70–99)
GLUCOSE BLD-MCNC: 254 MG/DL (ref 70–99)
GLUCOSE BLD-MCNC: 292 MG/DL (ref 70–99)
GLUCOSE SERPL-MCNC: 325 MG/DL (ref 70–99)
HCT VFR BLD AUTO: 27.7 % (ref 36–48)
HGB BLD-MCNC: 9 G/DL (ref 12–16)
LYMPHOCYTES # BLD: 1.6 K/UL (ref 1–5.1)
LYMPHOCYTES NFR BLD: 22.9 %
MCH RBC QN AUTO: 28.3 PG (ref 26–34)
MCHC RBC AUTO-ENTMCNC: 32.4 G/DL (ref 31–36)
MCV RBC AUTO: 87.4 FL (ref 80–100)
MONOCYTES # BLD: 0.7 K/UL (ref 0–1.3)
MONOCYTES NFR BLD: 10.3 %
NEUTROPHILS # BLD: 4.3 K/UL (ref 1.7–7.7)
NEUTROPHILS NFR BLD: 63.6 %
PERFORMED ON: ABNORMAL
PLATELET # BLD AUTO: 274 K/UL (ref 135–450)
PMV BLD AUTO: 9.3 FL (ref 5–10.5)
POTASSIUM SERPL-SCNC: 4.3 MMOL/L (ref 3.5–5.1)
RBC # BLD AUTO: 3.17 M/UL (ref 4–5.2)
SODIUM SERPL-SCNC: 136 MMOL/L (ref 136–145)
WBC # BLD AUTO: 6.8 K/UL (ref 4–11)

## 2024-03-25 PROCEDURE — 80048 BASIC METABOLIC PNL TOTAL CA: CPT

## 2024-03-25 PROCEDURE — 94640 AIRWAY INHALATION TREATMENT: CPT

## 2024-03-25 PROCEDURE — 94761 N-INVAS EAR/PLS OXIMETRY MLT: CPT

## 2024-03-25 PROCEDURE — 2700000000 HC OXYGEN THERAPY PER DAY

## 2024-03-25 PROCEDURE — 2580000003 HC RX 258: Performed by: HOSPITALIST

## 2024-03-25 PROCEDURE — G0378 HOSPITAL OBSERVATION PER HR: HCPCS

## 2024-03-25 PROCEDURE — 51798 US URINE CAPACITY MEASURE: CPT

## 2024-03-25 PROCEDURE — 6370000000 HC RX 637 (ALT 250 FOR IP): Performed by: NURSE PRACTITIONER

## 2024-03-25 PROCEDURE — 85025 COMPLETE CBC W/AUTO DIFF WBC: CPT

## 2024-03-25 PROCEDURE — 71045 X-RAY EXAM CHEST 1 VIEW: CPT

## 2024-03-25 PROCEDURE — 6360000002 HC RX W HCPCS: Performed by: HOSPITALIST

## 2024-03-25 PROCEDURE — 6370000000 HC RX 637 (ALT 250 FOR IP): Performed by: HOSPITALIST

## 2024-03-25 PROCEDURE — 96372 THER/PROPH/DIAG INJ SC/IM: CPT

## 2024-03-25 RX ORDER — BETHANECHOL CHLORIDE 25 MG/1
25 TABLET ORAL 2 TIMES DAILY
Qty: 90 TABLET | Refills: 3 | Status: SHIPPED | OUTPATIENT
Start: 2024-03-25

## 2024-03-25 RX ORDER — ALBUTEROL SULFATE 2.5 MG/3ML
2.5 SOLUTION RESPIRATORY (INHALATION)
Status: DISCONTINUED | OUTPATIENT
Start: 2024-03-25 | End: 2024-03-25

## 2024-03-25 RX ORDER — INSULIN LISPRO 100 [IU]/ML
15 INJECTION, SOLUTION INTRAVENOUS; SUBCUTANEOUS
Status: DISCONTINUED | OUTPATIENT
Start: 2024-03-25 | End: 2024-03-25 | Stop reason: HOSPADM

## 2024-03-25 RX ORDER — INSULIN LISPRO 100 [IU]/ML
0-16 INJECTION, SOLUTION INTRAVENOUS; SUBCUTANEOUS
Status: DISCONTINUED | OUTPATIENT
Start: 2024-03-25 | End: 2024-03-25 | Stop reason: HOSPADM

## 2024-03-25 RX ORDER — BETHANECHOL CHLORIDE 25 MG/1
25 TABLET ORAL 2 TIMES DAILY
Status: DISCONTINUED | OUTPATIENT
Start: 2024-03-25 | End: 2024-03-25 | Stop reason: HOSPADM

## 2024-03-25 RX ORDER — INSULIN LISPRO 100 [IU]/ML
0-4 INJECTION, SOLUTION INTRAVENOUS; SUBCUTANEOUS NIGHTLY
Status: DISCONTINUED | OUTPATIENT
Start: 2024-03-25 | End: 2024-03-25 | Stop reason: HOSPADM

## 2024-03-25 RX ORDER — ALBUTEROL SULFATE 2.5 MG/3ML
2.5 SOLUTION RESPIRATORY (INHALATION)
Status: DISCONTINUED | OUTPATIENT
Start: 2024-03-25 | End: 2024-03-25 | Stop reason: HOSPADM

## 2024-03-25 RX ORDER — TAMSULOSIN HYDROCHLORIDE 0.4 MG/1
0.4 CAPSULE ORAL DAILY
Status: DISCONTINUED | OUTPATIENT
Start: 2024-03-25 | End: 2024-03-25 | Stop reason: HOSPADM

## 2024-03-25 RX ORDER — ALBUTEROL SULFATE 2.5 MG/3ML
2.5 SOLUTION RESPIRATORY (INHALATION) EVERY 4 HOURS PRN
Status: DISCONTINUED | OUTPATIENT
Start: 2024-03-25 | End: 2024-03-25 | Stop reason: HOSPADM

## 2024-03-25 RX ADMIN — METOPROLOL TARTRATE 25 MG: 25 TABLET, FILM COATED ORAL at 08:58

## 2024-03-25 RX ADMIN — SODIUM CHLORIDE, PRESERVATIVE FREE 10 ML: 5 INJECTION INTRAVENOUS at 08:59

## 2024-03-25 RX ADMIN — CLOPIDOGREL BISULFATE 75 MG: 75 TABLET ORAL at 08:58

## 2024-03-25 RX ADMIN — INSULIN LISPRO 15 UNITS: 100 INJECTION, SOLUTION INTRAVENOUS; SUBCUTANEOUS at 08:58

## 2024-03-25 RX ADMIN — LEFLUNOMIDE 20 MG: 20 TABLET ORAL at 08:58

## 2024-03-25 RX ADMIN — TAMSULOSIN HYDROCHLORIDE 0.4 MG: 0.4 CAPSULE ORAL at 11:57

## 2024-03-25 RX ADMIN — ALBUTEROL SULFATE 2.5 MG: 2.5 SOLUTION RESPIRATORY (INHALATION) at 05:40

## 2024-03-25 RX ADMIN — LUBIPROSTONE 24 MCG: 24 CAPSULE, GELATIN COATED ORAL at 08:58

## 2024-03-25 RX ADMIN — HEPARIN SODIUM 5000 UNITS: 5000 INJECTION INTRAVENOUS; SUBCUTANEOUS at 05:57

## 2024-03-25 RX ADMIN — BETHANECHOL CHLORIDE 25 MG: 25 TABLET ORAL at 11:57

## 2024-03-25 RX ADMIN — ACETAMINOPHEN 650 MG: 325 TABLET ORAL at 17:28

## 2024-03-25 RX ADMIN — LUBIPROSTONE 24 MCG: 24 CAPSULE, GELATIN COATED ORAL at 17:28

## 2024-03-25 RX ADMIN — INSULIN LISPRO 15 UNITS: 100 INJECTION, SOLUTION INTRAVENOUS; SUBCUTANEOUS at 11:57

## 2024-03-25 RX ADMIN — LEVOTHYROXINE SODIUM 150 MCG: 0.07 TABLET ORAL at 05:57

## 2024-03-25 RX ADMIN — ALBUTEROL SULFATE 2.5 MG: 2.5 SOLUTION RESPIRATORY (INHALATION) at 18:30

## 2024-03-25 RX ADMIN — ALBUTEROL SULFATE 2.5 MG: 2.5 SOLUTION RESPIRATORY (INHALATION) at 10:19

## 2024-03-25 RX ADMIN — OXYCODONE HYDROCHLORIDE 20 MG: 20 TABLET, FILM COATED, EXTENDED RELEASE ORAL at 08:59

## 2024-03-25 RX ADMIN — INSULIN LISPRO 8 UNITS: 100 INJECTION, SOLUTION INTRAVENOUS; SUBCUTANEOUS at 08:59

## 2024-03-25 RX ADMIN — HEPARIN SODIUM 5000 UNITS: 5000 INJECTION INTRAVENOUS; SUBCUTANEOUS at 14:58

## 2024-03-25 RX ADMIN — INSULIN LISPRO 8 UNITS: 100 INJECTION, SOLUTION INTRAVENOUS; SUBCUTANEOUS at 11:57

## 2024-03-25 ASSESSMENT — PAIN DESCRIPTION - LOCATION: LOCATION: HEAD

## 2024-03-25 ASSESSMENT — PAIN SCALES - GENERAL: PAINLEVEL_OUTOF10: 0

## 2024-03-25 ASSESSMENT — PAIN DESCRIPTION - DESCRIPTORS: DESCRIPTORS: ACHING

## 2024-03-25 NOTE — CARE COORDINATION
Case Management Assessment  Initial Evaluation    Date/Time of Evaluation: 3/25/2024 8:38 AM  Assessment Completed by: Bill Moreira Jr, RN    If patient is discharged prior to next notation, then this note serves as note for discharge by case management.    Patient Name: Crow Bonner                   YOB: 1970  Diagnosis: Abnormal CT of the abdomen [R93.5]  MARIA D (acute kidney injury) (HCC) [N17.9]  Pneumonia due to infectious organism, unspecified laterality, unspecified part of lung [J18.9]                   Date / Time: 3/23/2024 10:55 AM    Patient Admission Status: Inpatient   Readmission Risk (Low < 19, Mod (19-27), High > 27): Readmission Risk Score: 26.3    Current PCP: Mariana Simental, DO  PCP verified by CM? (P) Yes    Chart Reviewed: Yes      History Provided by: (P) Patient  Patient Orientation: (P) Alert and Oriented    Patient Cognition: (P) Alert    Hospitalization in the last 30 days (Readmission):  Yes    If yes, Readmission Assessment in  Navigator will be completed.    Advance Directives:      Code Status: Full Code   Patient's Primary Decision Maker is: (P) Legal Next of Kin      Discharge Planning:    Patient lives with: (P) Spouse/Significant Other Type of Home: (P) House  Primary Care Giver: (P) Self  Patient Support Systems include: (P) Spouse/Significant Other   Current Financial resources: (P) Medicaid  Current community resources: (P) None  Current services prior to admission: (P) None            Current DME:              Type of Home Care services:  (P) None    ADLS  Prior functional level: (P) Independent in ADLs/IADLs  Current functional level: (P) Independent in ADLs/IADLs    PT AM-PAC:   /24  OT AM-PAC:   /24    Family can provide assistance at DC: (P) Yes  Would you like Case Management to discuss the discharge plan with any other family members/significant others, and if so, who? (P) Yes (Spouse)  Plans to Return to Present Housing: (P) Unknown at  present  Other Identified Issues/Barriers to RETURNING to current housing:   Potential Assistance needed at discharge: (P) Other (Comment) (TBD)            Potential DME:    Patient expects to discharge to: (P) House  Plan for transportation at discharge: (P) Family    Financial    Payor: [employer] / Plan: [employer] [place] MEDICAID / Product Type: *No Product type* /     Does insurance require precert for SNF: Yes    Potential assistance Purchasing Medications: (P) No  Meds-to-Beds request:        [id number] - [place], [place] - [address] - P [phone] - F [phone]  [address] [facility] [address]  Phone: [phone] Fax: [phone]      Notes:    Factors facilitating achievement of predicted outcomes: Family support    Barriers to discharge: Pain    Additional Case Management Notes:     - no needs identified at this time    The Plan for Transition of Care is related to the following treatment goals of Abnormal CT of the abdomen [R93.5]  MARIA D (acute kidney injury) (HCC) [N17.9]  Pneumonia due to infectious organism, unspecified laterality, unspecified part of lung [J18.9]    IF APPLICABLE: The Patient and/or patient representative Crow and her family were provided with a choice of provider and agrees with the discharge plan. Freedom of choice list with basic dialogue that supports the patient's individualized plan of care/goals and shares the quality data associated with the providers was provided to:     Patient Representative Name:       The Patient and/or Patient Representative Agree with the Discharge Plan?      Bill Moreira Jr RN  Case Management Department  Ph: [phone] Fax: [phone]

## 2024-03-25 NOTE — CONSULTS
Urology Consult Note  [facility]     Patient: Crow Bonner MRN: [id number]  Room/Bed: 4TN-4469/4469-01   YOB: 1970  Age/Sex: 54 y.o.female  Admission Date: 3/23/2024     Date of Service:  3/25/2024    Consulting Provider: HERMES Tomas CNP  Admitting/Requesting Physician: Phill Doss MD  Primary Care Physician: Mariana Simental DO    Reason for Consult: Urinary Retention    ASSESSMENT/PLAN     55 yo female with hx of urinary retention march of 2023 in the setting of fluid overload. Jamison eventually removed and did well. Now urology consulted for urinary retention and jamison placed for a PVR of 680cc's.     Recommendations:  Start Flomax and urecholine  Patient and  want jamison out, will see if ok with nephrology and transition to CIC PRN for PVR's >400cc's.     All the patients questions were answered. She understands the plan as listed above.    HISTORY     Chief Complaint:   Chief Complaint   Patient presents with    Urinary Retention     Reports no urine in the last 3 dys, worsening swelling in left arm/hand       History of Present Illness: Crow Bonner is a 54 y.o. female with urinary retention. Onset of symptoms was days ago with improving course since that time. Symptoms are aggravated by fluid overload. Symptoms improved with diuretics. Associated symptoms include decreased urine output. Patient also reports bladder distension. She has tried the following treatments: jamison.     Past Medical History:  She has a past medical history of Acute CVA (cerebrovascular accident) ([facility]) (03/09/2023), Anxiety, Anxiety and depression, Arthritis, Asthma, CAD (coronary artery disease), Cancer ([facility]) (2007), Cerebral artery occlusion with cerebral infarction ([facility]), CHF (congestive heart failure) ([facility]) (2018), Chronic kidney disease, Chronic pain, Constipation, COPD (chronic obstructive pulmonary disease) ([facility]), Depression, Diabetes mellitus ([facility]), Diabetic polyneuropathy

## 2024-03-25 NOTE — PROGRESS NOTES
03/25/24 1832   RT Protocol   History Pulmonary Disease 2   Respiratory pattern 0   Breath sounds 2   Cough 0   Indications for Bronchodilator Therapy Decreased or absent breath sounds   Bronchodilator Assessment Score 4

## 2024-03-25 NOTE — PLAN OF CARE
Problem: Discharge Planning  Goal: Discharge to home or other facility with appropriate resources  Outcome: Progressing     Problem: Pain  Goal: Verbalizes/displays adequate comfort level or baseline comfort level  Outcome: Progressing     Problem: Safety - Adult  Goal: Free from fall injury  Outcome: Progressing     Problem: ABCDS Injury Assessment  Goal: Absence of physical injury  Outcome: Progressing     Problem: Skin/Tissue Integrity  Goal: Absence of new skin breakdown  Description: 1.  Monitor for areas of redness and/or skin breakdown  2.  Assess vascular access sites hourly  3.  Every 4-6 hours minimum:  Change oxygen saturation probe site  4.  Every 4-6 hours:  If on nasal continuous positive airway pressure, respiratory therapy assess nares and determine need for appliance change or resting period.  Outcome: Progressing     Problem: Respiratory - Adult  Goal: Achieves optimal ventilation and oxygenation  Outcome: Progressing     Problem: Gastrointestinal - Adult  Goal: Minimal or absence of nausea and vomiting  Outcome: Progressing  Goal: Maintains or returns to baseline bowel function  Outcome: Progressing     Problem: Genitourinary - Adult  Goal: Absence of urinary retention  Outcome: Progressing  Goal: Urinary catheter remains patent  Outcome: Progressing     Problem: Metabolic/Fluid and Electrolytes - Adult  Goal: Electrolytes maintained within normal limits  Outcome: Progressing  Goal: Hemodynamic stability and optimal renal function maintained  Outcome: Progressing  Goal: Glucose maintained within prescribed range  Outcome: Progressing

## 2024-03-25 NOTE — PROGRESS NOTES
+------------------------+----------+---------------+----------+ !Common Femoral          !Yes       !Yes            !None      ! +------------------------+----------+---------------+----------+ !Femoral                 !Yes       !Yes            !None      ! +------------------------+----------+---------------+----------+ !Prox Femoral            !Yes       !Yes            !None      ! +------------------------+----------+---------------+----------+ !Mid Femoral             !Yes       !Yes            !None      ! +------------------------+----------+---------------+----------+ !Dist Femoral            !Yes       !Yes            !None      ! +------------------------+----------+---------------+----------+ !Deep Femoral            !Yes       !Yes            !None      ! +------------------------+----------+---------------+----------+ !Popliteal               !Yes       !Yes            !None      ! +------------------------+----------+---------------+----------+ !GSV Below Knee          !Yes       !Yes            !None      ! +------------------------+----------+---------------+----------+ !Gastroc                 !Yes       !Yes            !None      ! +------------------------+----------+---------------+----------+ !Soleal                  !Yes       !Yes            !None      ! +------------------------+----------+---------------+----------+ !PTV                     !Yes       !Yes            !None      ! +------------------------+----------+---------------+----------+ !Peroneal                !Yes       !Yes            !None      ! +------------------------+----------+---------------+----------+ !GSV Calf                !Yes       !Yes            !None      ! +------------------------+----------+---------------+----------+ !SSV                     !Yes       !Yes            !None      ! +------------------------+----------+---------------+----------+ Right Doppler Measurements  normal.     No acute cardiopulmonary process.         Electronically Signed:  Pat Barrera MD 3/25/2024

## 2024-03-25 NOTE — TELEPHONE ENCOUNTER
It is very likely that the combination of high doses of torsemide plus Entresto plus Jardiance caused this.  With her frequent recent admissions, follow up in the office was postponed and meds have been changed too many times without seeing her.    During the admission for fluid overload, we were trying new meds to try and keep the fluid from reaccumulating.  So probably the news meds plus the high doses of torsemide pushed the kidneys too far.    I am happy to see that her kidneys are now okay.  We need to be careful with these high doses of torsemide.  We likely need to adjust the diuretics based upon labs and not just her weight.  We will come up with a new plan.    I believe we should keep her on the Jardiance but stop the Entresto.  The entresto is a good idea, but not now, until we get everything more stable again as far as the fluid.    We need her to be seen in the office within a week of being discharged.  We need to resume getting labs every 2 weeks to follow this closely.    She has an appointment with us on Wednesday.  We can keep that appt if she goes home tomorrow or we will push it to early next week.    ARETHA

## 2024-03-25 NOTE — CARE COORDINATION
Readmission Assessment        03/25/24 2522   Readmission Assessment   Number of Days since last admission? 1-7 days   Previous Disposition Home with Family   Who is being Interviewed Patient   What was the patient's/caregiver's perception as to why they think they needed to return back to the hospital? Other (Comment)  (Kidney Failure)   Did you visit your Primary Care Physician after you left the hospital, before you returned this time? No   Why weren't you able to visit your PCP? Other (Comment)  (Discharge one week ago)   Did you see a specialist, such as Cardiac, Pulmonary, Orthopedic Physician, etc. after you left the hospital? No   Who advised the patient to return to the hospital? Self-referral   Does the patient report anything that got in the way of taking their medications? No   In our efforts to provide the best possible care to you and others like you, can you think of anything that we could have done to help you after you left the hospital the first time, so that you might not have needed to return so soon? Identify patient's health literacy needs;Improved written discharge instructions     MARISOL Hernandez RN    [facility]  Phone: [phone]

## 2024-03-25 NOTE — PROGRESS NOTES
V2.0    [facility] Progress Note      Name:  Crow Bonner /Age/Sex: 1970  (54 y.o. female)   MRN & CSN:  [id number] & [id number] Encounter Date/Time: 3/25/2024 7:56 AM EDT   Location:  [facility]4469/4469-01 PCP: Mariana Simental DO     Attending:Phill Doss MD       Hospital Day: 3    Assessment and Recommendations   Crow Bonner is a 54 y.o. female who presents with MARIA D (acute kidney injury) (HCC)      Plan:   Acute on chronic kidney disease  Likely post obstruction given patient unable to urinate for 3 days  Presenting creatinine of 4.3 down to 1.5 to  Carmona catheter remains in place  I have consulted urology has given urinary retention      Leukocytosis resolved    Chronic CHF appears compensated did have some edema  Diuretics still on hold will likely resume today will discuss with nephrology      Uncontrolled diabetes  Uptitrate her home dose Lantus and sliding scale we will holding it at a lower dose due to poor appetite due to concern for hypoglycemia will adjust now today      Chronic CHF      Diet ADULT DIET; Regular; 5 carb choices (75 gm/meal); No Pork   DVT Prophylaxis    Code Status Full Code   Disposition 1 to 2 days pending improvement         Personally reviewed Lab Studies and Imaging         Subjective:     Chief Complaint:     Crow Bonner is a 54 y.o. female who presents with       Review of Systems:      Pertinent positives and negatives discussed in HPI    Objective:     Intake/Output Summary (Last 24 hours) at 3/25/2024 0756  Last data filed at 3/25/2024 0428  Gross per 24 hour   Intake 360 ml   Output 2775 ml   Net -2415 ml      Vitals:   Vitals:    24 2312 24 2330 24 0400 24 0540   BP:  119/76 124/75    Pulse: 74 76 69 70   Resp: 16 18 18 16   Temp:  97.3 °F (36.3 °C) 97.2 °F (36.2 °C)    TempSrc:  Oral Oral    SpO2: 98% 97% 98% 98%   Weight:   93.3 kg (205 lb 11 oz)    Height:             Physical Exam:      General: NAD  Eyes: EOMI  ENT: neck  left arm/hand) FINDINGS: There is some platelike atelectasis or infiltrate noted in the the right lung base and left mid lung zone.  This was not well demonstrated on the prior examination.  The heart appears unremarkable.  Bony structures appear normal.     Platelike atelectasis or infiltrate noted in the right lung base and left mid lung zone. This was not well demonstrated on the prior examination.  This could represent pneumonia and requires clinical correlation.       CBC:   Recent Labs     03/23/24  1119 03/24/24  0536 03/25/24  0425   WBC 14.1* 9.7 6.8   HGB 10.1* 9.2* 9.0*    282 274     BMP:    Recent Labs     03/23/24  1119 03/24/24  0536 03/25/24  0425   * 135* 136   K 4.7 3.8 4.3   CL 91* 99 100   CO2 24 24 29   BUN 56* 51* 39*   CREATININE 4.3* 2.7* 1.4*   GLUCOSE 125* 104* 325*     Hepatic:   Recent Labs     03/23/24  1119   AST 26   ALT 22   BILITOT <0.2   ALKPHOS 101     Lipids:   Lab Results   Component Value Date/Time    CHOL 185 01/25/2024 10:44 AM    HDL 47 01/25/2024 10:44 AM    TRIG 248 01/25/2024 10:44 AM     Hemoglobin A1C:   Lab Results   Component Value Date/Time    LABA1C 7.6 03/23/2024 11:19 AM     TSH:   Lab Results   Component Value Date/Time    TSH 1.92 01/25/2024 10:44 AM     Troponin: No results found for: \"TROPONINT\"  Lactic Acid:   Recent Labs     03/23/24  1119   LACTA 1.2     BNP:   Recent Labs     03/23/24  1119   PROBNP 242*     UA:  Lab Results   Component Value Date/Time    NITRU Negative 03/23/2024 02:00 PM    COLORU Yellow 03/23/2024 02:00 PM    PHUR 5.0 03/23/2024 02:00 PM    WBCUA 2 03/13/2024 04:50 PM    RBCUA 44 03/13/2024 04:50 PM    BACTERIA None Seen 03/13/2024 04:50 PM    CLARITYU Clear 03/23/2024 02:00 PM    SPECGRAV 1.025 03/23/2024 02:00 PM    LEUKOCYTESUR Negative 03/23/2024 02:00 PM    UROBILINOGEN 0.2 03/23/2024 02:00 PM    BILIRUBINUR Negative 03/23/2024 02:00 PM    BILIRUBINUR neg 04/26/2022 04:54 PM    BILIRUBINUR NEGATIVE 07/08/2010 03:25 PM

## 2024-03-25 NOTE — TELEPHONE ENCOUNTER
Janel,  called to inform the office that the Pt was released from [facility] last week.  Pt Kidney Provider is stating that she is taking to many medicine.  is requesting someone from the office call him to discuss this further.  Please advise.  Thank you

## 2024-03-25 NOTE — TELEPHONE ENCOUNTER
Spoke with Mr. Bonner  He tells us that Crow is in the hospital now again after recent discharge.    He states pt came back to hospital because she had not urinated in three days.    Cardiology has not been consulted at this time.    Mr. Bonner states that cardiology put her on a medication at the last admission.  Now at this admission, nephrology states she's on too much medication.  She has been taken off of diuretics.    Mr. Bonner cannot tell us what medication he is referring to.  He will find the medication at home and either call back or stop by the office tomorrow.

## 2024-03-25 NOTE — PROGRESS NOTES
Shift assessment completed. Neuro WNL. Denies any pain at this time. AM meds administered per MAR. Kristine remains in place. The care plan and education has been reviewed and mutually agreed upon with the patient. Standard safety measures in place.    7:12 PM  IV removed prior dc. The care plan and education has been resolved and completed. Discharge instructions and medications reviewed with patient. Patient voices understanding and denies further concerns at this time. Patient discharged home per order with all personal belongings.      Pt is in room waiting for  to bring her portable O2 to aide transportation home.

## 2024-03-25 NOTE — PROGRESS NOTES
CLINICAL PHARMACY NOTE: MEDS TO BEDS    Total # of Prescriptions Filled: 1   The following medications were delivered to the patient:  Bethanechol 25    Additional Documentation:   signed, ok to deliver per gutierrez islas

## 2024-03-25 NOTE — PROGRESS NOTES
2035: Assessment complete, VSS, lung sounds diminished, SOB with exertion, pulses intact in all extremities, LUE re-wrapped with ace, elevated on 2 pillows, 1+ edema to RENETTA LE, elevated on pillow, jamison draining clear yellow urine, discussed care plan, pt mutually agrees, call light and belongings in reach, no other needs at this time    2200: pt up tot he BR, large BM.    2300: pt SOB, states she feels like she is getting choked, RT called to give PRN albuterol inhaler, increased O2 to 4L for comfort SPO2 98%.   Diane Bueno RN

## 2024-03-25 NOTE — RT PROTOCOL NOTE
RT Nebulizer Bronchodilator Protocol Note    There is a bronchodilator order in the chart from a provider indicating to follow the RT Bronchodilator Protocol and there is an “Initiate RT Bronchodilator Protocol” order as well (see protocol at bottom of note).    CXR Findings:  XR CHEST PORTABLE    Result Date: 3/25/2024  Bilateral airspace disease, left greater than right, similar to prior     XR CHEST PORTABLE    Result Date: 3/24/2024  Pulmonary vascular congestion, similar to prior.       The findings from the last RT Protocol Assessment were as follows:  Smoking: Chronic pulmonary disease  Respiratory Pattern: Regular pattern and RR 12-20 bpm  Breath Sounds: Slightly diminished and/or crackles  Cough: Strong, spontaneous, non-productive  Indication for Bronchodilator Therapy: Decreased or absent breath sounds  Bronchodilator Assessment Score: 4    Aerosolized bronchodilator medication orders have been revised according to the RT Nebulizer Bronchodilator Protocol below.    Respiratory Therapist to perform RT Therapy Protocol Assessment initially then follow the protocol.  Repeat RT Therapy Protocol Assessment PRN for score 0-3 or on second treatment, BID, and PRN for scores above 3.    No Indications - adjust the frequency to every 6 hours PRN wheezing or bronchospasm, if no treatments needed after 48 hours then discontinue using Per Protocol order mode.     If indication present, adjust the RT bronchodilator orders based on the Bronchodilator Assessment Score as indicated below.  If a patient is on this medication at home then do not decrease Frequency below that used at home.    0-3 - enter or revise RT bronchodilator order(s) to equivalent RT Bronchodilator order with Frequency of every 4 hours PRN for wheezing or increased work of breathing using Per Protocol order mode.       4-6 - enter or revise RT Bronchodilator order(s) to two equivalent RT bronchodilator orders with one order with BID Frequency and one

## 2024-03-27 LAB
BACTERIA BLD CULT ORG #2: NORMAL
BACTERIA BLD CULT: NORMAL

## 2024-03-27 ASSESSMENT — ENCOUNTER SYMPTOMS
RESPIRATORY NEGATIVE: 1
GASTROINTESTINAL NEGATIVE: 1

## 2024-03-27 NOTE — PROGRESS NOTES
[facility]   Congestive Heart Failure    PrimaryCare Doctor:  Mariana Simental DO      Chief Complaint:  CHF    History of Present Illness:  Crow Bonner is a 54 y.o. female with PMH breast cancer, chronic lymphedema, non obstructive CAD, HFpEF who presents today for hospital f/u. She was readmitted 3/24/24 for urinary retention, hypotension and MARIA D. She was recently readmitted 3/13/24-3/18/24 for acute resp distress requiring intubation. Before that she was admitted 3/6-3/9 for HF exacerbation- entresto and jardiance started at that time.      Today: She co fatigue and weakness. She was told to hold entresto and lolita and decrease torsemide dose at discharge but she went back to old med list and is taking all of the above. She has lost 4lb since discharge on her home scale and one on ours. She is here today for stat labs and to decide if she should have bimonthly IV lasix. She denies chest pain, dyspnea, orthopnea, PND, exertional chest pressure/discomfort, early saiety, syncope.  Edema is stable. She tells me the entresto is causing diarrhea        Baseline Weight:   Wt Readings from Last 3 Encounters:   03/28/24 92.5 kg (204 lb)   03/25/24 93.3 kg (205 lb 11 oz)   03/18/24 88.6 kg (195 lb 5.2 oz)        EF: 55-60%  Cardiac Imaging:Echo 3/6/24:   Summary   Definity was used to better delineate endocardial borders.   Normal left ventricle size, wall thickness, and systolic function with an   estimated ejection fraction of 60%. No regional wall motion abnormalities   are seen.   No regional wall motion abnormalities are noted.   The right ventricle is normal in size and function.   Mitral annular calcification is present.   Unable to estimate pulmonary artery pressure secondary to incomplete TR jet   envelope.  Echo 10/3/22:   Summary   Normal left ventricle size, wall thickness and systolic function with an   estimated ejection fraction of 55-60%.   No regional wall motion abnormalities are seen.

## 2024-03-28 ENCOUNTER — OFFICE VISIT (OUTPATIENT)
Dept: CARDIOLOGY CLINIC | Age: 54
End: 2024-03-28
Payer: COMMERCIAL

## 2024-03-28 ENCOUNTER — HOSPITAL ENCOUNTER (OUTPATIENT)
Dept: ONCOLOGY | Age: 54
Setting detail: INFUSION SERIES
Discharge: HOME | End: 2024-03-28

## 2024-03-28 ENCOUNTER — HOSPITAL ENCOUNTER (OUTPATIENT)
Age: 54
Discharge: HOME | End: 2024-03-28
Payer: COMMERCIAL

## 2024-03-28 VITALS
OXYGEN SATURATION: 90 % | HEART RATE: 100 BPM | WEIGHT: 204 LBS | BODY MASS INDEX: 34.83 KG/M2 | SYSTOLIC BLOOD PRESSURE: 100 MMHG | DIASTOLIC BLOOD PRESSURE: 64 MMHG | HEIGHT: 64 IN

## 2024-03-28 DIAGNOSIS — I50.32 CHRONIC HEART FAILURE WITH PRESERVED EJECTION FRACTION (HCC): Primary | ICD-10-CM

## 2024-03-28 DIAGNOSIS — Z09 HOSPITAL DISCHARGE FOLLOW-UP: ICD-10-CM

## 2024-03-28 DIAGNOSIS — I10 ESSENTIAL HYPERTENSION: Chronic | ICD-10-CM

## 2024-03-28 DIAGNOSIS — N17.9 AKI (ACUTE KIDNEY INJURY) (HCC): ICD-10-CM

## 2024-03-28 DIAGNOSIS — I50.32 CHRONIC DIASTOLIC HEART FAILURE (HCC): Primary | ICD-10-CM

## 2024-03-28 DIAGNOSIS — I50.32 CHRONIC HEART FAILURE WITH PRESERVED EJECTION FRACTION (HCC): ICD-10-CM

## 2024-03-28 PROBLEM — K14.6 TONGUE PAIN: Status: RESOLVED | Noted: 2020-02-02 | Resolved: 2024-03-28

## 2024-03-28 LAB
ANION GAP SERPL CALCULATED.3IONS-SCNC: 13 MMOL/L (ref 3–16)
BUN SERPL-MCNC: 27 MG/DL (ref 7–20)
CALCIUM SERPL-MCNC: 9.6 MG/DL (ref 8.3–10.6)
CHLORIDE SERPL-SCNC: 101 MMOL/L (ref 99–110)
CO2 SERPL-SCNC: 22 MMOL/L (ref 21–32)
CREAT SERPL-MCNC: 1.9 MG/DL (ref 0.6–1.1)
GFR SERPLBLD CREATININE-BSD FMLA CKD-EPI: 31 ML/MIN/{1.73_M2}
GLUCOSE SERPL-MCNC: 142 MG/DL (ref 70–99)
NT-PROBNP SERPL-MCNC: 531 PG/ML (ref 0–124)
POTASSIUM SERPL-SCNC: 5.7 MMOL/L (ref 3.5–5.1)
SODIUM SERPL-SCNC: 136 MMOL/L (ref 136–145)

## 2024-03-28 PROCEDURE — 1036F TOBACCO NON-USER: CPT | Performed by: NURSE PRACTITIONER

## 2024-03-28 PROCEDURE — 80048 BASIC METABOLIC PNL TOTAL CA: CPT

## 2024-03-28 PROCEDURE — 83880 ASSAY OF NATRIURETIC PEPTIDE: CPT

## 2024-03-28 PROCEDURE — 99215 OFFICE O/P EST HI 40 MIN: CPT | Performed by: NURSE PRACTITIONER

## 2024-03-28 PROCEDURE — 3017F COLORECTAL CA SCREEN DOC REV: CPT | Performed by: NURSE PRACTITIONER

## 2024-03-28 PROCEDURE — 3074F SYST BP LT 130 MM HG: CPT | Performed by: NURSE PRACTITIONER

## 2024-03-28 PROCEDURE — G8417 CALC BMI ABV UP PARAM F/U: HCPCS | Performed by: NURSE PRACTITIONER

## 2024-03-28 PROCEDURE — 1111F DSCHRG MED/CURRENT MED MERGE: CPT | Performed by: NURSE PRACTITIONER

## 2024-03-28 PROCEDURE — 3078F DIAST BP <80 MM HG: CPT | Performed by: NURSE PRACTITIONER

## 2024-03-28 PROCEDURE — 36415 COLL VENOUS BLD VENIPUNCTURE: CPT

## 2024-03-28 PROCEDURE — G8427 DOCREV CUR MEDS BY ELIG CLIN: HCPCS | Performed by: NURSE PRACTITIONER

## 2024-03-28 PROCEDURE — G8482 FLU IMMUNIZE ORDER/ADMIN: HCPCS | Performed by: NURSE PRACTITIONER

## 2024-03-28 RX ORDER — SACUBITRIL AND VALSARTAN 24; 26 MG/1; MG/1
0.5 TABLET, FILM COATED ORAL 2 TIMES DAILY
Qty: 60 TABLET | Refills: 0 | Status: SHIPPED | OUTPATIENT
Start: 2024-03-28

## 2024-03-28 RX ORDER — SPIRONOLACTONE 50 MG/1
50 TABLET, FILM COATED ORAL DAILY
Qty: 90 TABLET | Refills: 1 | Status: SHIPPED | OUTPATIENT
Start: 2024-03-28

## 2024-03-28 NOTE — PATIENT INSTRUCTIONS
Instructions:   Medications:decrease spironolactone to 50mg once a day, do not take spironolactone until Saturday, hold entresto until I see you next week  Labs: next week before appointment  Follow up: as scheduled      [place] [facility] Resource Line: [phone]

## 2024-03-28 NOTE — PROGRESS NOTES
[facility]   Congestive Heart Failure    PrimaryCare Doctor:  Mariana Simental DO      Chief Complaint:  CHF    History of Present Illness:  Crow Bonner is a 54 y.o. female with PMH breast cancer, chronic lymphedema, non obstructive CAD, HFpEF who presents today to f/u on wt and edema to see if she needs IV lasix today. She has not had scheduled infusion since her discharge for MARIA D 3 weeks ago.  She was readmitted 3/24/24 for urinary retention, hypotension and MARIA D. She was recently readmitted 3/13/24-3/18/24 for acute resp distress requiring intubation. Before that she was admitted 3/6-3/9 for HF exacerbation- entresto and jardiance started at that time.  At her last OV we held entresto for diarrhea and decreased torsemide and lolita for continued MARIA D.     Today: She feels about the same, her labs today are improved except her BG is 500. Her wt is down 4lb on our scale since last week, edema is stable and she c/o intermittent SOB.  She denies chest pain, dyspnea, orthopnea, PND, exertional chest pressure/discomfort, early saiety, syncope.      Today's Wt at home: 195    Baseline Weight: 190 per pt  Wt Readings from Last 3 Encounters:   04/04/24 90.7 kg (200 lb)   03/28/24 92.5 kg (204 lb)   03/25/24 93.3 kg (205 lb 11 oz)        EF: 55-60%  Cardiac Imaging:Echo 3/6/24:   Summary   Definity was used to better delineate endocardial borders.   Normal left ventricle size, wall thickness, and systolic function with an   estimated ejection fraction of 60%. No regional wall motion abnormalities   are seen.   No regional wall motion abnormalities are noted.   The right ventricle is normal in size and function.   Mitral annular calcification is present.   Unable to estimate pulmonary artery pressure secondary to incomplete TR jet   envelope.  Echo 10/3/22:   Summary   Normal left ventricle size, wall thickness and systolic function with an   estimated ejection fraction of 55-60%.   No regional wall motion

## 2024-04-01 ENCOUNTER — TELEPHONE (OUTPATIENT)
Dept: PULMONOLOGY | Age: 54
End: 2024-04-01

## 2024-04-01 NOTE — TELEPHONE ENCOUNTER
Pt  called and asked that we send to new script to [facility] for poc.  She is on 2 liters

## 2024-04-01 NOTE — DISCHARGE SUMMARY
[facility]    Patient Demographics    Patient. Crow Bonner  Date of Birth. 1970  MRN. [id number]     Primary care provider. Mariana Simental DO  (Tel: [phone])    Admit date: 3/23/2024    Discharge date (blank if same as Note Date): 3/25/2024  Note Date: 4/1/2024     Reason for Hospitalization.   Chief Complaint   Patient presents with    Urinary Retention     Reports no urine in the last 3 dys, worsening swelling in left arm/hand           Problem-based Hospital Course.  Acute on chronic kidney disease  Likely secondary to post obstruction needed Carmona catheter.  With improvement in symptoms  Evaluated by nephrology urology.  Patient voiding trial was good tolerated well started on Flomax outpatient follow-up with urology  Creatinine back to baseline.    Consults.  IP CONSULT TO SPIRITUAL SERVICES  IP CONSULT TO SOCIAL WORK  IP CONSULT TO UROLOGY    Physical examination on discharge day.   /82   Pulse 73   Temp 98.3 °F (36.8 °C) (Oral)   Resp 18   Ht 1.626 m (5' 4\")   Wt 93.3 kg (205 lb 11 oz)   SpO2 98%   BMI 35.31 kg/m²   General appearance.  Alert. Looks comfortable.  HEENT. Sclera clear. Moist mucus membranes.  Cardiovascular. Regular rate and rhythm, normal S1, S2. No murmur.   Respiratory. Not using accessory muscles.Clear to auscultation bilaterally, no wheeze.  Gastrointestinal. Abdomen soft, non-tender, not distended, normal bowel sounds  Neurology. Facial symmetry. No speech deficits. Moving all extremities equally.  Extremities. No edema in lower extremities.  Skin. Warm, dry, normal turgor    Condition at time of discharge stable     Medication instructions provided to patient at discharge.     Medication List        START taking these medications      bethanechol 25 MG tablet  Commonly known as: URECHOLINE  Take 1

## 2024-04-01 NOTE — TELEPHONE ENCOUNTER
It has been over 6 months since the patient was last seen to get a POC the DME will need new office notes within 30 days and new testing. She has an upcoming appointment on 04/16/2024

## 2024-04-04 ENCOUNTER — HOSPITAL ENCOUNTER (OUTPATIENT)
Dept: ONCOLOGY | Age: 54
Setting detail: INFUSION SERIES
Discharge: HOME | End: 2024-04-04

## 2024-04-04 ENCOUNTER — OFFICE VISIT (OUTPATIENT)
Dept: CARDIOLOGY CLINIC | Age: 54
End: 2024-04-04
Payer: COMMERCIAL

## 2024-04-04 ENCOUNTER — HOSPITAL ENCOUNTER (OUTPATIENT)
Age: 54
Discharge: HOME | End: 2024-04-04
Payer: COMMERCIAL

## 2024-04-04 VITALS
SYSTOLIC BLOOD PRESSURE: 100 MMHG | DIASTOLIC BLOOD PRESSURE: 60 MMHG | HEIGHT: 64 IN | WEIGHT: 200 LBS | OXYGEN SATURATION: 90 % | HEART RATE: 69 BPM | BODY MASS INDEX: 34.15 KG/M2

## 2024-04-04 DIAGNOSIS — I10 ESSENTIAL HYPERTENSION: Chronic | ICD-10-CM

## 2024-04-04 DIAGNOSIS — I50.32 CHRONIC HEART FAILURE WITH PRESERVED EJECTION FRACTION (HCC): Primary | Chronic | ICD-10-CM

## 2024-04-04 DIAGNOSIS — I50.32 CHRONIC DIASTOLIC HEART FAILURE (HCC): Primary | ICD-10-CM

## 2024-04-04 DIAGNOSIS — I50.32 CHRONIC HEART FAILURE WITH PRESERVED EJECTION FRACTION (HCC): Chronic | ICD-10-CM

## 2024-04-04 PROBLEM — N17.9 AKI (ACUTE KIDNEY INJURY) (HCC): Status: RESOLVED | Noted: 2024-03-23 | Resolved: 2024-04-04

## 2024-04-04 PROBLEM — R41.840 INATTENTION: Status: RESOLVED | Noted: 2020-04-29 | Resolved: 2024-04-04

## 2024-04-04 PROBLEM — M79.89 LEFT UPPER EXTREMITY SWELLING: Status: RESOLVED | Noted: 2024-03-07 | Resolved: 2024-04-04

## 2024-04-04 LAB
ANION GAP SERPL CALCULATED.3IONS-SCNC: 13 MMOL/L (ref 3–16)
BUN SERPL-MCNC: 22 MG/DL (ref 7–20)
CALCIUM SERPL-MCNC: 9.1 MG/DL (ref 8.3–10.6)
CHLORIDE SERPL-SCNC: 90 MMOL/L (ref 99–110)
CO2 SERPL-SCNC: 28 MMOL/L (ref 21–32)
CREAT SERPL-MCNC: 1.3 MG/DL (ref 0.6–1.1)
GFR SERPLBLD CREATININE-BSD FMLA CKD-EPI: 49 ML/MIN/{1.73_M2}
GLUCOSE SERPL-MCNC: 506 MG/DL (ref 70–99)
NT-PROBNP SERPL-MCNC: 205 PG/ML (ref 0–124)
POTASSIUM SERPL-SCNC: 4.7 MMOL/L (ref 3.5–5.1)
SODIUM SERPL-SCNC: 131 MMOL/L (ref 136–145)

## 2024-04-04 PROCEDURE — G8427 DOCREV CUR MEDS BY ELIG CLIN: HCPCS | Performed by: NURSE PRACTITIONER

## 2024-04-04 PROCEDURE — 83880 ASSAY OF NATRIURETIC PEPTIDE: CPT

## 2024-04-04 PROCEDURE — 3074F SYST BP LT 130 MM HG: CPT | Performed by: NURSE PRACTITIONER

## 2024-04-04 PROCEDURE — 3017F COLORECTAL CA SCREEN DOC REV: CPT | Performed by: NURSE PRACTITIONER

## 2024-04-04 PROCEDURE — 99214 OFFICE O/P EST MOD 30 MIN: CPT | Performed by: NURSE PRACTITIONER

## 2024-04-04 PROCEDURE — G8417 CALC BMI ABV UP PARAM F/U: HCPCS | Performed by: NURSE PRACTITIONER

## 2024-04-04 PROCEDURE — 36415 COLL VENOUS BLD VENIPUNCTURE: CPT

## 2024-04-04 PROCEDURE — 1111F DSCHRG MED/CURRENT MED MERGE: CPT | Performed by: NURSE PRACTITIONER

## 2024-04-04 PROCEDURE — 1036F TOBACCO NON-USER: CPT | Performed by: NURSE PRACTITIONER

## 2024-04-04 PROCEDURE — 3078F DIAST BP <80 MM HG: CPT | Performed by: NURSE PRACTITIONER

## 2024-04-04 PROCEDURE — 80048 BASIC METABOLIC PNL TOTAL CA: CPT

## 2024-04-04 ASSESSMENT — ENCOUNTER SYMPTOMS: SHORTNESS OF BREATH: 1

## 2024-04-04 NOTE — PATIENT INSTRUCTIONS
Instructions:   Medications: stay off entresto, continue torsemide and spironolactone as is  Labs: next week before appointment  Follow up: one week      [place] [facility] Resource Line: [phone]

## 2024-04-04 NOTE — PROGRESS NOTES
[facility]   Congestive Heart Failure    PrimaryCare Doctor:  Mariana Simental DO      Chief Complaint:  CHF    History of Present Illness:  Crow Bonner is a 54 y.o. female with PMH breast cancer, chronic lymphedema, non obstructive CAD, HFpEF who presents today to f/u on wt and edema to see if she needs IV lasix today. She has not had scheduled infusion since her discharge for MARIA D 3 weeks ago.  She was readmitted 3/24/24 for urinary retention, hypotension and MARIA D. She was recently readmitted 3/13/24-3/18/24 for acute resp distress requiring intubation. Before that she was admitted 3/6-3/9 for HF exacerbation- entresto and jardiance started at that time.  At her last OV we continued holding entresto for diarrhea/CKD/hypotension and decreased torsemide and lolita for continued MARIA D.     Today: She c/o increased SOB, edema and wt gain of 11lb on her scale, 8 on ours.  She denies chest pain, orthopnea, PND, exertional chest pressure/discomfort, early saiety, syncope.      Today's Wt at home: 206     Baseline Weight: 190 per pt  Wt Readings from Last 3 Encounters:   04/11/24 94.3 kg (208 lb)   04/08/24 96.2 kg (212 lb)   04/04/24 90.7 kg (200 lb)        EF: 55-60%  Cardiac Imaging:Echo 3/6/24:   Summary   Definity was used to better delineate endocardial borders.   Normal left ventricle size, wall thickness, and systolic function with an   estimated ejection fraction of 60%. No regional wall motion abnormalities   are seen.   No regional wall motion abnormalities are noted.   The right ventricle is normal in size and function.   Mitral annular calcification is present.   Unable to estimate pulmonary artery pressure secondary to incomplete TR jet   envelope.  Echo 10/3/22:   Summary   Normal left ventricle size, wall thickness and systolic function with an   estimated ejection fraction of 55-60%.   No regional wall motion abnormalities are seen.   Diastolic filling parameters suggests grade I diastolic

## 2024-04-08 ENCOUNTER — OFFICE VISIT (OUTPATIENT)
Dept: INTERNAL MEDICINE CLINIC | Age: 54
End: 2024-04-08
Payer: COMMERCIAL

## 2024-04-08 VITALS
WEIGHT: 212 LBS | BODY MASS INDEX: 36.39 KG/M2 | SYSTOLIC BLOOD PRESSURE: 128 MMHG | OXYGEN SATURATION: 96 % | DIASTOLIC BLOOD PRESSURE: 74 MMHG | HEART RATE: 67 BPM

## 2024-04-08 DIAGNOSIS — E11.69 TYPE 2 DIABETES MELLITUS WITH OTHER SPECIFIED COMPLICATION, WITH LONG-TERM CURRENT USE OF INSULIN (HCC): ICD-10-CM

## 2024-04-08 DIAGNOSIS — E11.42 DIABETIC POLYNEUROPATHY ASSOCIATED WITH TYPE 2 DIABETES MELLITUS (HCC): ICD-10-CM

## 2024-04-08 DIAGNOSIS — Z09 HOSPITAL DISCHARGE FOLLOW-UP: Primary | ICD-10-CM

## 2024-04-08 DIAGNOSIS — N18.4 CKD (CHRONIC KIDNEY DISEASE) STAGE 4, GFR 15-29 ML/MIN (HCC): ICD-10-CM

## 2024-04-08 DIAGNOSIS — R33.9 URINE RETENTION: ICD-10-CM

## 2024-04-08 DIAGNOSIS — I10 ESSENTIAL HYPERTENSION: Chronic | ICD-10-CM

## 2024-04-08 DIAGNOSIS — Z75.8 LANGUAGE BARRIER AFFECTING HEALTH CARE: ICD-10-CM

## 2024-04-08 DIAGNOSIS — Z60.3 LANGUAGE BARRIER AFFECTING HEALTH CARE: ICD-10-CM

## 2024-04-08 DIAGNOSIS — N17.9 ACUTE KIDNEY INJURY SUPERIMPOSED ON CKD (HCC): ICD-10-CM

## 2024-04-08 DIAGNOSIS — N18.9 ACUTE KIDNEY INJURY SUPERIMPOSED ON CKD (HCC): ICD-10-CM

## 2024-04-08 DIAGNOSIS — J45.50 SEVERE PERSISTENT ASTHMA WITHOUT COMPLICATION: ICD-10-CM

## 2024-04-08 DIAGNOSIS — I50.32 CHRONIC HEART FAILURE WITH PRESERVED EJECTION FRACTION (HCC): Chronic | ICD-10-CM

## 2024-04-08 DIAGNOSIS — Z79.4 TYPE 2 DIABETES MELLITUS WITH OTHER SPECIFIED COMPLICATION, WITH LONG-TERM CURRENT USE OF INSULIN (HCC): ICD-10-CM

## 2024-04-08 PROCEDURE — 1111F DSCHRG MED/CURRENT MED MERGE: CPT | Performed by: INTERNAL MEDICINE

## 2024-04-08 PROCEDURE — 3074F SYST BP LT 130 MM HG: CPT | Performed by: INTERNAL MEDICINE

## 2024-04-08 PROCEDURE — 1036F TOBACCO NON-USER: CPT | Performed by: INTERNAL MEDICINE

## 2024-04-08 PROCEDURE — 3017F COLORECTAL CA SCREEN DOC REV: CPT | Performed by: INTERNAL MEDICINE

## 2024-04-08 PROCEDURE — 99214 OFFICE O/P EST MOD 30 MIN: CPT | Performed by: INTERNAL MEDICINE

## 2024-04-08 PROCEDURE — 2022F DILAT RTA XM EVC RTNOPTHY: CPT | Performed by: INTERNAL MEDICINE

## 2024-04-08 PROCEDURE — G8427 DOCREV CUR MEDS BY ELIG CLIN: HCPCS | Performed by: INTERNAL MEDICINE

## 2024-04-08 PROCEDURE — 3078F DIAST BP <80 MM HG: CPT | Performed by: INTERNAL MEDICINE

## 2024-04-08 PROCEDURE — G8417 CALC BMI ABV UP PARAM F/U: HCPCS | Performed by: INTERNAL MEDICINE

## 2024-04-08 PROCEDURE — 3051F HG A1C>EQUAL 7.0%<8.0%: CPT | Performed by: INTERNAL MEDICINE

## 2024-04-08 RX ORDER — BETHANECHOL CHLORIDE 25 MG/1
25 TABLET ORAL 2 TIMES DAILY
Qty: 60 TABLET | Refills: 0 | Status: SHIPPED | OUTPATIENT
Start: 2024-04-08

## 2024-04-08 SDOH — ECONOMIC STABILITY: FOOD INSECURITY: WITHIN THE PAST 12 MONTHS, YOU WORRIED THAT YOUR FOOD WOULD RUN OUT BEFORE YOU GOT MONEY TO BUY MORE.: NEVER TRUE

## 2024-04-08 SDOH — ECONOMIC STABILITY: FOOD INSECURITY: WITHIN THE PAST 12 MONTHS, THE FOOD YOU BOUGHT JUST DIDN'T LAST AND YOU DIDN'T HAVE MONEY TO GET MORE.: NEVER TRUE

## 2024-04-08 SDOH — ECONOMIC STABILITY: HOUSING INSECURITY
IN THE LAST 12 MONTHS, WAS THERE A TIME WHEN YOU DID NOT HAVE A STEADY PLACE TO SLEEP OR SLEPT IN A SHELTER (INCLUDING NOW)?: NO

## 2024-04-08 SDOH — SOCIAL STABILITY - SOCIAL INSECURITY: ACCULTURATION DIFFICULTY: Z60.3

## 2024-04-08 SDOH — ECONOMIC STABILITY: INCOME INSECURITY: HOW HARD IS IT FOR YOU TO PAY FOR THE VERY BASICS LIKE FOOD, HOUSING, MEDICAL CARE, AND HEATING?: NOT HARD AT ALL

## 2024-04-08 ASSESSMENT — ENCOUNTER SYMPTOMS
SORE THROAT: 0
WHEEZING: 0
NAUSEA: 0
CONSTIPATION: 0
BACK PAIN: 0
SHORTNESS OF BREATH: 1
VOMITING: 0
ABDOMINAL PAIN: 0
CHEST TIGHTNESS: 0
COUGH: 0
COLOR CHANGE: 0

## 2024-04-08 NOTE — ASSESSMENT & PLAN NOTE
blood pressure stable and well-controlled on current medication regimen, continue same and follow-up with cardiology as scheduled

## 2024-04-08 NOTE — ASSESSMENT & PLAN NOTE
reports symptomatic relief since discharged home, will refill 1 month supply until able to follow-up with regular PCP and nephrology

## 2024-04-08 NOTE — PROGRESS NOTES
follow-up with regular PCP and nephrology  Orders:  -     bethanechol (URECHOLINE) 25 MG tablet; Take 1 tablet by mouth in the morning and at bedtime, Disp-60 tablet, R-0Normal  10. Language barrier affecting health care      No follow-ups on file.     SUBJECTIVE  HPI:   Patient of Dr. Simental, here to follow-up on recent hospitalization, she has multiple medical problems with frequent admissions.  Was last admitted March 23 with urine retention, was found to be in acute kidney failure and acute exacerbation of heart failure, treated with IV fluids and adjusting diuretic therapy, urology were consulted, no mechanical obstruction, Carmona was discontinued and patient was discharged on bethanechol.  She did follow-up with cardiology following hospital discharge, her numbers improved except for hyperglycemia, remains off Entresto and recommended to continue torsemide and spironolactone.  Reports she has been feeling back to baseline since released from the hospital, she is compliant with the use of oxygen and taking recommended medications, she is scheduled to follow-up with nephrology        Review of Systems   Constitutional:  Negative for activity change, appetite change and fatigue.   HENT:  Negative for congestion, hearing loss, mouth sores and sore throat.    Respiratory:  Positive for shortness of breath. Negative for cough, chest tightness and wheezing.    Cardiovascular:  Positive for leg swelling. Negative for chest pain and palpitations.   Gastrointestinal:  Negative for abdominal pain, constipation, nausea and vomiting.   Genitourinary:  Negative for difficulty urinating, dysuria, frequency, hematuria and urgency.   Musculoskeletal:  Positive for arthralgias. Negative for back pain, gait problem and joint swelling.   Skin:  Negative for color change.   Allergic/Immunologic: Positive for environmental allergies. Negative for immunocompromised state.   Neurological:  Negative for dizziness, light-headedness and

## 2024-04-08 NOTE — ASSESSMENT & PLAN NOTE
reevaluated by cardiology following hospital discharge and back to baseline, today's exam is stable.

## 2024-04-08 NOTE — ASSESSMENT & PLAN NOTE
labs rechecked prior to discharge back to baseline, patient was evaluated by nephrology and is scheduled to follow-up with them in the near future, diuretic therapy was adjusted and she did follow-up with cardiology subsequently, encouraged to follow-up with Dr. Simental as scheduled next months since back to baseline, reinforced recommendations to adhere to low-salt diet and continue current medication as per cardiology, nephrology and endocrinology

## 2024-04-08 NOTE — ASSESSMENT & PLAN NOTE
blood sugar remains fluctuating, diet recommendations and compliance with medication regimen reinforced, she does follow-up with endocrinology, limited ability to exercise.  Continue ambulatory blood sugar monitoring and follow-up with Endo as scheduled

## 2024-04-08 NOTE — ASSESSMENT & PLAN NOTE
on supplemental oxygen 24/7, today's exam is within acceptable range except for occasional expiratory wheezing, continue current inhalational therapy along with supplemental oxygen.

## 2024-04-11 ENCOUNTER — HOSPITAL ENCOUNTER (OUTPATIENT)
Dept: ONCOLOGY | Age: 54
Setting detail: INFUSION SERIES
Discharge: HOME | End: 2024-04-11
Payer: COMMERCIAL

## 2024-04-11 ENCOUNTER — HOSPITAL ENCOUNTER (OUTPATIENT)
Age: 54
Discharge: HOME | End: 2024-04-11
Payer: COMMERCIAL

## 2024-04-11 ENCOUNTER — OFFICE VISIT (OUTPATIENT)
Dept: CARDIOLOGY CLINIC | Age: 54
End: 2024-04-11
Payer: COMMERCIAL

## 2024-04-11 VITALS
TEMPERATURE: 96.8 F | DIASTOLIC BLOOD PRESSURE: 67 MMHG | SYSTOLIC BLOOD PRESSURE: 127 MMHG | HEART RATE: 61 BPM | RESPIRATION RATE: 20 BRPM | OXYGEN SATURATION: 100 %

## 2024-04-11 VITALS
WEIGHT: 208 LBS | HEIGHT: 64 IN | DIASTOLIC BLOOD PRESSURE: 50 MMHG | BODY MASS INDEX: 35.51 KG/M2 | OXYGEN SATURATION: 95 % | SYSTOLIC BLOOD PRESSURE: 104 MMHG | HEART RATE: 61 BPM

## 2024-04-11 DIAGNOSIS — R06.02 SOB (SHORTNESS OF BREATH): ICD-10-CM

## 2024-04-11 DIAGNOSIS — I50.9 ACUTE ON CHRONIC CONGESTIVE HEART FAILURE, UNSPECIFIED HEART FAILURE TYPE (HCC): Primary | ICD-10-CM

## 2024-04-11 DIAGNOSIS — R60.0 LOCALIZED EDEMA: ICD-10-CM

## 2024-04-11 DIAGNOSIS — I50.32 CHRONIC HEART FAILURE WITH PRESERVED EJECTION FRACTION (HCC): ICD-10-CM

## 2024-04-11 DIAGNOSIS — I10 ESSENTIAL HYPERTENSION: Chronic | ICD-10-CM

## 2024-04-11 DIAGNOSIS — N18.4 CKD (CHRONIC KIDNEY DISEASE) STAGE 4, GFR 15-29 ML/MIN (HCC): ICD-10-CM

## 2024-04-11 DIAGNOSIS — N18.9 CHRONIC KIDNEY DISEASE (CKD), ACTIVE MEDICAL MANAGEMENT WITHOUT DIALYSIS, UNSPECIFIED STAGE: ICD-10-CM

## 2024-04-11 DIAGNOSIS — I50.33 ACUTE ON CHRONIC DIASTOLIC CONGESTIVE HEART FAILURE (HCC): Primary | ICD-10-CM

## 2024-04-11 LAB
ALBUMIN SERPL-MCNC: 3.7 G/DL (ref 3.4–5)
ANION GAP SERPL CALCULATED.3IONS-SCNC: 12 MMOL/L (ref 3–16)
BUN SERPL-MCNC: 22 MG/DL (ref 7–20)
CALCIUM SERPL-MCNC: 8.9 MG/DL (ref 8.3–10.6)
CHLORIDE SERPL-SCNC: 102 MMOL/L (ref 99–110)
CO2 SERPL-SCNC: 30 MMOL/L (ref 21–32)
CREAT SERPL-MCNC: 1.2 MG/DL (ref 0.6–1.1)
DEPRECATED RDW RBC AUTO: 14 % (ref 12.4–15.4)
GFR SERPLBLD CREATININE-BSD FMLA CKD-EPI: 53 ML/MIN/{1.73_M2}
GLUCOSE SERPL-MCNC: 191 MG/DL (ref 70–99)
HCT VFR BLD AUTO: 32.1 % (ref 36–48)
HGB BLD-MCNC: 10.5 G/DL (ref 12–16)
MCH RBC QN AUTO: 29 PG (ref 26–34)
MCHC RBC AUTO-ENTMCNC: 32.7 G/DL (ref 31–36)
MCV RBC AUTO: 88.5 FL (ref 80–100)
NT-PROBNP SERPL-MCNC: 275 PG/ML (ref 0–124)
PHOSPHATE SERPL-MCNC: 3.8 MG/DL (ref 2.5–4.9)
PLATELET # BLD AUTO: 183 K/UL (ref 135–450)
PMV BLD AUTO: 10.4 FL (ref 5–10.5)
POTASSIUM SERPL-SCNC: 4.3 MMOL/L (ref 3.5–5.1)
RBC # BLD AUTO: 3.63 M/UL (ref 4–5.2)
SODIUM SERPL-SCNC: 144 MMOL/L (ref 136–145)
WBC # BLD AUTO: 7.5 K/UL (ref 4–11)

## 2024-04-11 PROCEDURE — 3017F COLORECTAL CA SCREEN DOC REV: CPT | Performed by: NURSE PRACTITIONER

## 2024-04-11 PROCEDURE — 3078F DIAST BP <80 MM HG: CPT | Performed by: NURSE PRACTITIONER

## 2024-04-11 PROCEDURE — 85027 COMPLETE CBC AUTOMATED: CPT

## 2024-04-11 PROCEDURE — 83880 ASSAY OF NATRIURETIC PEPTIDE: CPT

## 2024-04-11 PROCEDURE — 1036F TOBACCO NON-USER: CPT | Performed by: NURSE PRACTITIONER

## 2024-04-11 PROCEDURE — 80069 RENAL FUNCTION PANEL: CPT

## 2024-04-11 PROCEDURE — 1111F DSCHRG MED/CURRENT MED MERGE: CPT | Performed by: NURSE PRACTITIONER

## 2024-04-11 PROCEDURE — G8427 DOCREV CUR MEDS BY ELIG CLIN: HCPCS | Performed by: NURSE PRACTITIONER

## 2024-04-11 PROCEDURE — 3074F SYST BP LT 130 MM HG: CPT | Performed by: NURSE PRACTITIONER

## 2024-04-11 PROCEDURE — G8417 CALC BMI ABV UP PARAM F/U: HCPCS | Performed by: NURSE PRACTITIONER

## 2024-04-11 PROCEDURE — 99214 OFFICE O/P EST MOD 30 MIN: CPT | Performed by: NURSE PRACTITIONER

## 2024-04-11 PROCEDURE — 99211 OFF/OP EST MAY X REQ PHY/QHP: CPT

## 2024-04-11 PROCEDURE — 6360000002 HC RX W HCPCS: Performed by: INTERNAL MEDICINE

## 2024-04-11 PROCEDURE — 96374 THER/PROPH/DIAG INJ IV PUSH: CPT

## 2024-04-11 PROCEDURE — 36415 COLL VENOUS BLD VENIPUNCTURE: CPT

## 2024-04-11 PROCEDURE — 2580000003 HC RX 258: Performed by: INTERNAL MEDICINE

## 2024-04-11 RX ORDER — SODIUM CHLORIDE 0.9 % (FLUSH) 0.9 %
5-40 SYRINGE (ML) INJECTION ONCE
Status: COMPLETED | OUTPATIENT
Start: 2024-04-11 | End: 2024-04-11

## 2024-04-11 RX ORDER — FUROSEMIDE 10 MG/ML
120 INJECTION INTRAMUSCULAR; INTRAVENOUS ONCE
Status: COMPLETED | OUTPATIENT
Start: 2024-04-11 | End: 2024-04-11

## 2024-04-11 RX ORDER — FUROSEMIDE 10 MG/ML
120 INJECTION INTRAMUSCULAR; INTRAVENOUS ONCE
Start: 2024-04-25 | End: 2024-04-25

## 2024-04-11 RX ORDER — SODIUM CHLORIDE 0.9 % (FLUSH) 0.9 %
5-40 SYRINGE (ML) INJECTION ONCE
Start: 2024-04-25 | End: 2024-04-25

## 2024-04-11 RX ADMIN — FUROSEMIDE 120 MG: 10 INJECTION, SOLUTION INTRAMUSCULAR; INTRAVENOUS at 14:29

## 2024-04-11 RX ADMIN — Medication 20 ML: at 14:26

## 2024-04-11 NOTE — PROGRESS NOTES
Nhung Phillips NP called unit that patient may receive her scheduled 120 mg Lasix today and that her labs were drawn today in doctors office. Pt to infusion center per wheel chair with . IV access obtained. IVP Lasix administered; 120 mg IVP given over 12 minutes. IV removed. Denied need for printed AVS. Pt to return in 2 weeks  for same treatment. Pt discharged to car per wheel chair.

## 2024-04-11 NOTE — PATIENT INSTRUCTIONS
Instructions:   Medications: I'll let you know after labs  Infusion center for IV lasix today  Labs: one week  Follow up: 2 weeks in infusion center for IV lasix      [facility] Resource Line: [phone]

## 2024-04-12 ENCOUNTER — TELEPHONE (OUTPATIENT)
Dept: CARDIOLOGY CLINIC | Age: 54
End: 2024-04-12

## 2024-04-12 RX ORDER — TORSEMIDE 100 MG/1
150 TABLET ORAL DAILY
Qty: 45 TABLET | Refills: 0 | Status: SHIPPED | OUTPATIENT
Start: 2024-04-12

## 2024-04-12 NOTE — TELEPHONE ENCOUNTER
----- Message from HERMES Fay CNP sent at 4/12/2024  8:36 AM EDT -----  Labs look ok, she can increase her torsemide to 100mg am and 50mg pm, leave lolita as is. RENAE    Tried to reach patient LMOM for him to return our call.

## 2024-04-16 ENCOUNTER — TELEPHONE (OUTPATIENT)
Dept: ENDOCRINOLOGY | Age: 54
End: 2024-04-16

## 2024-04-16 ENCOUNTER — OFFICE VISIT (OUTPATIENT)
Dept: PULMONOLOGY | Age: 54
End: 2024-04-16
Payer: COMMERCIAL

## 2024-04-16 VITALS — BODY MASS INDEX: 34.04 KG/M2 | WEIGHT: 199.4 LBS | OXYGEN SATURATION: 90 % | HEIGHT: 64 IN

## 2024-04-16 DIAGNOSIS — E11.59 TYPE 2 DIABETES MELLITUS WITH OTHER CIRCULATORY COMPLICATION, WITH LONG-TERM CURRENT USE OF INSULIN (HCC): ICD-10-CM

## 2024-04-16 DIAGNOSIS — J96.11 CHRONIC RESPIRATORY FAILURE WITH HYPOXIA (HCC): ICD-10-CM

## 2024-04-16 DIAGNOSIS — J45.30 MILD PERSISTENT ASTHMA WITHOUT COMPLICATION: ICD-10-CM

## 2024-04-16 DIAGNOSIS — Z79.4 TYPE 2 DIABETES MELLITUS WITH OTHER CIRCULATORY COMPLICATION, WITH LONG-TERM CURRENT USE OF INSULIN (HCC): ICD-10-CM

## 2024-04-16 DIAGNOSIS — G47.33 OSA (OBSTRUCTIVE SLEEP APNEA): Primary | ICD-10-CM

## 2024-04-16 PROCEDURE — G8427 DOCREV CUR MEDS BY ELIG CLIN: HCPCS | Performed by: INTERNAL MEDICINE

## 2024-04-16 PROCEDURE — G8417 CALC BMI ABV UP PARAM F/U: HCPCS | Performed by: INTERNAL MEDICINE

## 2024-04-16 PROCEDURE — 3017F COLORECTAL CA SCREEN DOC REV: CPT | Performed by: INTERNAL MEDICINE

## 2024-04-16 PROCEDURE — 1036F TOBACCO NON-USER: CPT | Performed by: INTERNAL MEDICINE

## 2024-04-16 PROCEDURE — 99214 OFFICE O/P EST MOD 30 MIN: CPT | Performed by: INTERNAL MEDICINE

## 2024-04-16 PROCEDURE — 1111F DSCHRG MED/CURRENT MED MERGE: CPT | Performed by: INTERNAL MEDICINE

## 2024-04-16 RX ORDER — BLOOD SUGAR DIAGNOSTIC
STRIP MISCELLANEOUS
Qty: 200 STRIP | Refills: 5 | Status: SHIPPED | OUTPATIENT
Start: 2024-04-16

## 2024-04-16 NOTE — PROGRESS NOTES
Diagnosis Date    Acute CVA (cerebrovascular accident) ([facility]) 03/09/2023    Anxiety     Anxiety and depression     Arthritis     not sure of specific type    Asthma     CAD (coronary artery disease)     Cancer ([facility]) 2007    left breast    Cerebral artery occlusion with cerebral infarction ([facility])     2000, 2001    CHF (congestive heart failure) ([facility]) 2018    Chronic kidney disease     renal insufficency/to see Dr Romario Saeed    Chronic pain     Constipation     COPD (chronic obstructive pulmonary disease) ([facility])     Depression     Diabetes mellitus ([facility])     Diabetic polyneuropathy associated with type 2 diabetes mellitus ([facility]) 02/02/2018    Dysthymia 02/02/2018    ESBL (extended spectrum beta-lactamase) producing bacteria infection 03/14/2018    urine    Fibromyalgia     Gastric ulcer, unspecified as acute or chronic, without mention of hemorrhage, perforation, or obstruction     GERD (gastroesophageal reflux disease)     Gout     HIGH CHOLESTEROL     Hypertension     Hypothyroidism     Pneumonia 03/25/2020    Pyelonephritis     Severe persistent asthma without complication 02/02/2018    Thyroid disease     TIA (transient ischemic attack)     with occasional left leg and hand weakness       PAST SURGICAL HISTORY:   Past Surgical History:   Procedure Laterality Date    BREAST SURGERY      left mastectomy    CARPAL TUNNEL RELEASE      Bilateral    FINGER CONTRACTURE SURGERY      HYSTERECTOMY (CERVIX STATUS UNKNOWN)  2005    USO    MASTECTOMY Left 2007    Lymphadema noted in LUE in office visit 1/23/2024    TONSILLECTOMY      UPPER GASTROINTESTINAL ENDOSCOPY  2019    stretched esophagous        SOCIAL HISTORY:   Social History     Tobacco Use    Smoking status: Never    Smokeless tobacco: Never   Vaping Use    Vaping Use: Never used   Substance Use Topics    Alcohol use: No    Drug use: No       FAMILY HISTORY:   Family History   Problem Relation Age of Onset    Asthma Other     Cancer Other     Depression Other

## 2024-04-16 NOTE — TELEPHONE ENCOUNTER
Refill is needed    blood glucose test strips (ONETOUCH VERIO) strip     [id number] - [place], [place] - [address] - P [phone] - F [phone] [address], [facility] [address]  Phone: [phone]  Fax: [phone]

## 2024-04-17 DIAGNOSIS — J45.30 MILD PERSISTENT ASTHMA WITHOUT COMPLICATION: ICD-10-CM

## 2024-04-17 RX ORDER — MONTELUKAST SODIUM 10 MG/1
10 TABLET ORAL
Qty: 30 TABLET | Refills: 0 | OUTPATIENT
Start: 2024-04-17

## 2024-04-19 ENCOUNTER — TELEPHONE (OUTPATIENT)
Dept: CARDIOLOGY CLINIC | Age: 54
End: 2024-04-19

## 2024-04-19 ENCOUNTER — HOSPITAL ENCOUNTER (OUTPATIENT)
Age: 54
Discharge: HOME | End: 2024-04-19
Payer: COMMERCIAL

## 2024-04-19 DIAGNOSIS — R06.02 SOB (SHORTNESS OF BREATH): ICD-10-CM

## 2024-04-19 DIAGNOSIS — I50.32 CHRONIC DIASTOLIC HEART FAILURE (HCC): ICD-10-CM

## 2024-04-19 DIAGNOSIS — N18.4 CKD (CHRONIC KIDNEY DISEASE) STAGE 4, GFR 15-29 ML/MIN (HCC): Primary | ICD-10-CM

## 2024-04-19 LAB
ANION GAP SERPL CALCULATED.3IONS-SCNC: 14 MMOL/L (ref 3–16)
BUN SERPL-MCNC: 42 MG/DL (ref 7–20)
CALCIUM SERPL-MCNC: 9.3 MG/DL (ref 8.3–10.6)
CHLORIDE SERPL-SCNC: 93 MMOL/L (ref 99–110)
CO2 SERPL-SCNC: 29 MMOL/L (ref 21–32)
CREAT SERPL-MCNC: 2 MG/DL (ref 0.6–1.1)
GFR SERPLBLD CREATININE-BSD FMLA CKD-EPI: 29 ML/MIN/{1.73_M2}
GLUCOSE SERPL-MCNC: 114 MG/DL (ref 70–99)
NT-PROBNP SERPL-MCNC: 351 PG/ML (ref 0–124)
POTASSIUM SERPL-SCNC: 5 MMOL/L (ref 3.5–5.1)
SODIUM SERPL-SCNC: 136 MMOL/L (ref 136–145)

## 2024-04-19 PROCEDURE — 36415 COLL VENOUS BLD VENIPUNCTURE: CPT

## 2024-04-19 PROCEDURE — 80048 BASIC METABOLIC PNL TOTAL CA: CPT

## 2024-04-19 PROCEDURE — 83880 ASSAY OF NATRIURETIC PEPTIDE: CPT

## 2024-04-19 NOTE — TELEPHONE ENCOUNTER
----- Message from Jessa Parsons MD sent at 4/19/2024  2:13 PM EDT -----  Please call patient.  Her kidneys are looking dry so she should not get IV lasix next week.  Cancel IV lasix appt.  ARETHA

## 2024-04-19 NOTE — TELEPHONE ENCOUNTER
Janel,  called to inform ARETHA that the Pt has weight gain 208, Pt did her lab test.   wants to know what to do about the weight gain.  Please call to discuss.  Thank you

## 2024-04-19 NOTE — TELEPHONE ENCOUNTER
Spoke with  Ha  Relayed ARETHA instructions  He is agreeable  Will continue current diuretic regimen  Get labs on Wednesday, 4/24/24    Results will determine if pt will receive Lasix infusion on 4/25/25

## 2024-04-24 ENCOUNTER — HOSPITAL ENCOUNTER (OUTPATIENT)
Age: 54
Discharge: HOME | End: 2024-04-24
Payer: COMMERCIAL

## 2024-04-24 DIAGNOSIS — R06.02 SOB (SHORTNESS OF BREATH): ICD-10-CM

## 2024-04-24 DIAGNOSIS — N18.4 CKD (CHRONIC KIDNEY DISEASE) STAGE 4, GFR 15-29 ML/MIN (HCC): ICD-10-CM

## 2024-04-24 DIAGNOSIS — J45.30 MILD PERSISTENT ASTHMA WITHOUT COMPLICATION: ICD-10-CM

## 2024-04-24 DIAGNOSIS — I50.32 CHRONIC DIASTOLIC HEART FAILURE (HCC): ICD-10-CM

## 2024-04-24 LAB
ANION GAP SERPL CALCULATED.3IONS-SCNC: 10 MMOL/L (ref 3–16)
BUN SERPL-MCNC: 30 MG/DL (ref 7–20)
CALCIUM SERPL-MCNC: 8.9 MG/DL (ref 8.3–10.6)
CHLORIDE SERPL-SCNC: 101 MMOL/L (ref 99–110)
CO2 SERPL-SCNC: 30 MMOL/L (ref 21–32)
CREAT SERPL-MCNC: 1.1 MG/DL (ref 0.6–1.1)
GFR SERPLBLD CREATININE-BSD FMLA CKD-EPI: 59 ML/MIN/{1.73_M2}
GLUCOSE SERPL-MCNC: 124 MG/DL (ref 70–99)
NT-PROBNP SERPL-MCNC: 557 PG/ML (ref 0–124)
POTASSIUM SERPL-SCNC: 4.3 MMOL/L (ref 3.5–5.1)
SODIUM SERPL-SCNC: 141 MMOL/L (ref 136–145)

## 2024-04-24 PROCEDURE — 83880 ASSAY OF NATRIURETIC PEPTIDE: CPT

## 2024-04-24 PROCEDURE — 80048 BASIC METABOLIC PNL TOTAL CA: CPT

## 2024-04-24 PROCEDURE — 36415 COLL VENOUS BLD VENIPUNCTURE: CPT

## 2024-04-24 RX ORDER — MONTELUKAST SODIUM 10 MG/1
10 TABLET ORAL
Qty: 30 TABLET | Refills: 0 | OUTPATIENT
Start: 2024-04-24

## 2024-04-24 NOTE — TELEPHONE ENCOUNTER
Medication supposed to be discontinued per Dr Olivo. Called pharmacy and spoke to tech, told him med is needing to be d/c. He canceled it out in their system.

## 2024-04-25 ENCOUNTER — HOSPITAL ENCOUNTER (OUTPATIENT)
Dept: ONCOLOGY | Age: 54
Setting detail: INFUSION SERIES
Discharge: HOME | End: 2024-04-25
Payer: COMMERCIAL

## 2024-04-25 ENCOUNTER — TELEPHONE (OUTPATIENT)
Dept: CARDIOLOGY CLINIC | Age: 54
End: 2024-04-25

## 2024-04-25 VITALS
DIASTOLIC BLOOD PRESSURE: 80 MMHG | HEART RATE: 75 BPM | SYSTOLIC BLOOD PRESSURE: 136 MMHG | RESPIRATION RATE: 20 BRPM | OXYGEN SATURATION: 100 % | TEMPERATURE: 96.9 F

## 2024-04-25 DIAGNOSIS — I50.32 CHRONIC DIASTOLIC HEART FAILURE (HCC): Primary | ICD-10-CM

## 2024-04-25 DIAGNOSIS — I50.9 ACUTE ON CHRONIC CONGESTIVE HEART FAILURE, UNSPECIFIED HEART FAILURE TYPE (HCC): Primary | ICD-10-CM

## 2024-04-25 DIAGNOSIS — I50.32 CHRONIC HEART FAILURE WITH PRESERVED EJECTION FRACTION (HCC): ICD-10-CM

## 2024-04-25 LAB
ANION GAP SERPL CALCULATED.3IONS-SCNC: 11 MMOL/L (ref 3–16)
BUN SERPL-MCNC: 25 MG/DL (ref 7–20)
CALCIUM SERPL-MCNC: 9.4 MG/DL (ref 8.3–10.6)
CHLORIDE SERPL-SCNC: 100 MMOL/L (ref 99–110)
CO2 SERPL-SCNC: 29 MMOL/L (ref 21–32)
CREAT SERPL-MCNC: 1.1 MG/DL (ref 0.6–1.1)
GFR SERPLBLD CREATININE-BSD FMLA CKD-EPI: 59 ML/MIN/{1.73_M2}
GLUCOSE SERPL-MCNC: 97 MG/DL (ref 70–99)
NT-PROBNP SERPL-MCNC: 786 PG/ML (ref 0–124)
POTASSIUM SERPL-SCNC: 4.5 MMOL/L (ref 3.5–5.1)
SODIUM SERPL-SCNC: 140 MMOL/L (ref 136–145)

## 2024-04-25 PROCEDURE — 2580000003 HC RX 258: Performed by: INTERNAL MEDICINE

## 2024-04-25 PROCEDURE — 80048 BASIC METABOLIC PNL TOTAL CA: CPT

## 2024-04-25 PROCEDURE — 6360000002 HC RX W HCPCS: Performed by: INTERNAL MEDICINE

## 2024-04-25 PROCEDURE — 96374 THER/PROPH/DIAG INJ IV PUSH: CPT

## 2024-04-25 PROCEDURE — 96375 TX/PRO/DX INJ NEW DRUG ADDON: CPT

## 2024-04-25 PROCEDURE — 99211 OFF/OP EST MAY X REQ PHY/QHP: CPT

## 2024-04-25 PROCEDURE — 83880 ASSAY OF NATRIURETIC PEPTIDE: CPT

## 2024-04-25 RX ORDER — SODIUM CHLORIDE 0.9 % (FLUSH) 0.9 %
5-40 SYRINGE (ML) INJECTION ONCE
Status: COMPLETED | OUTPATIENT
Start: 2024-04-25 | End: 2024-04-25

## 2024-04-25 RX ORDER — FUROSEMIDE 10 MG/ML
120 INJECTION INTRAMUSCULAR; INTRAVENOUS ONCE
Start: 2024-05-09 | End: 2024-05-09

## 2024-04-25 RX ORDER — FUROSEMIDE 10 MG/ML
120 INJECTION INTRAMUSCULAR; INTRAVENOUS ONCE
Status: COMPLETED | OUTPATIENT
Start: 2024-04-25 | End: 2024-04-25

## 2024-04-25 RX ORDER — SODIUM CHLORIDE 0.9 % (FLUSH) 0.9 %
5-40 SYRINGE (ML) INJECTION ONCE
Start: 2024-05-09 | End: 2024-05-09

## 2024-04-25 RX ADMIN — Medication 10 ML: at 14:23

## 2024-04-25 RX ADMIN — FUROSEMIDE 120 MG: 10 INJECTION, SOLUTION INTRAMUSCULAR; INTRAVENOUS at 14:22

## 2024-04-25 NOTE — PROGRESS NOTES
Patient to department for outpatient labs and lasix. Here from cardiology office. Labs drawn followed by lasix 120 mg at 20 mg per minute. Tolerated well. Patient aware that he will have urinary urgency and frequency. Also aware that this tx may lower his b/p and he may feel dizzy upon standing. All questions answered. To follow up with cardiology office.

## 2024-04-26 ENCOUNTER — TELEPHONE (OUTPATIENT)
Dept: CARDIOLOGY CLINIC | Age: 54
End: 2024-04-26

## 2024-04-26 NOTE — TELEPHONE ENCOUNTER
LMOM for pt with results and recommendations per NPKV. Advised to call back with questions or concerns.      ----- Message from HERMES Fay CNP sent at 4/26/2024  8:15 AM EDT -----  Please let her know we do not need labs next week, we'll just resume IV lasix every 2 weeks with labs. Next one on 5/9. RENAE

## 2024-04-29 NOTE — PROGRESS NOTES
Patient: Crow Bonner is a 54 y.o. female who presents today with the following Chief Complaint(s):  Chief Complaint   Patient presents with    3 Month Follow-Up       HPI    Here today for follow-up.   helps with history d/t language barrier. Primary language is Turkish.     Patient was admitted to [facility] twice in March.  She was initially admitted 3/6-3/9/24 with heart failure exacerbation.  Entresto and Jardiance were started at discharge.  She was then readmitted 3/13 through 3/18/2024 with acute respiratory distress requiring intubation secondary to congestive heart failure.  She required diuresis..  She was again admitted 3/23 through 3/25/2024 for urinary retention with MARIA D Carmona catheter was placed and she was started on Flomax with outpatient follow-up with urology.    She has follow-up appointment scheduled with nephrology tomorrow and cardiology in June with endocrinology in August.    Follows with Dr. Olivo for pulmonology and was told that her \"lungs have stopped working\".  Last appointment was 4/16/24. Note reviewed.   Is now on 3 lpm oxygen in place of 2 lpm.   Recommended trial of CPAP but did not tolerate it at home. Did not like the mask.     Reviewed prior CT scans, no mention of pulmonary fibrosis.   Did have XRT for breast cancer.     Tearful today. Very worried about not being able to breathe, worried about being fluid overloaded.     Is taking Demadex 100 mg am/ 50 mg pm about 6 hours later. C/o being up every 15-30 minutes at night to urinate.  clarifies that she sleeps from 4 am to 12 pm and then takes Demadex around 1 pm and 7 pm.  Is worried that she is still gaining weight despite Demadex dosing.     Would like a wheelchair. She has difficulty walking due to foot pain, back pain, shortness of breath d/t CHF. Has to stop and rest every 5 minutes when out walking.     On Flomax qd for urinary retention. Cannot take 2/day as she feels like her bladder gets over-full.

## 2024-04-30 ENCOUNTER — OFFICE VISIT (OUTPATIENT)
Dept: INTERNAL MEDICINE CLINIC | Age: 54
End: 2024-04-30

## 2024-04-30 VITALS
BODY MASS INDEX: 34.72 KG/M2 | HEART RATE: 68 BPM | SYSTOLIC BLOOD PRESSURE: 110 MMHG | DIASTOLIC BLOOD PRESSURE: 68 MMHG | OXYGEN SATURATION: 96 % | WEIGHT: 202.3 LBS

## 2024-04-30 DIAGNOSIS — M51.36 DDD (DEGENERATIVE DISC DISEASE), LUMBAR: ICD-10-CM

## 2024-04-30 DIAGNOSIS — Z60.3 LANGUAGE BARRIER AFFECTING HEALTH CARE: ICD-10-CM

## 2024-04-30 DIAGNOSIS — M79.7 FIBROMYALGIA: ICD-10-CM

## 2024-04-30 DIAGNOSIS — F33.2 SEVERE EPISODE OF RECURRENT MAJOR DEPRESSIVE DISORDER, WITHOUT PSYCHOTIC FEATURES (HCC): ICD-10-CM

## 2024-04-30 DIAGNOSIS — M06.00 SERONEGATIVE RHEUMATOID ARTHRITIS (HCC): ICD-10-CM

## 2024-04-30 DIAGNOSIS — F41.9 ANXIETY AND DEPRESSION: ICD-10-CM

## 2024-04-30 DIAGNOSIS — Z75.8 LANGUAGE BARRIER AFFECTING HEALTH CARE: ICD-10-CM

## 2024-04-30 DIAGNOSIS — M50.30 DDD (DEGENERATIVE DISC DISEASE), CERVICAL: ICD-10-CM

## 2024-04-30 DIAGNOSIS — R33.9 URINE RETENTION: ICD-10-CM

## 2024-04-30 DIAGNOSIS — I50.32 CHRONIC DIASTOLIC HEART FAILURE (HCC): ICD-10-CM

## 2024-04-30 DIAGNOSIS — N18.31 STAGE 3A CHRONIC KIDNEY DISEASE (HCC): ICD-10-CM

## 2024-04-30 DIAGNOSIS — I10 ESSENTIAL HYPERTENSION: Chronic | ICD-10-CM

## 2024-04-30 DIAGNOSIS — G44.221 CHRONIC TENSION-TYPE HEADACHE, INTRACTABLE: ICD-10-CM

## 2024-04-30 DIAGNOSIS — G89.29 OTHER CHRONIC PAIN: Chronic | ICD-10-CM

## 2024-04-30 DIAGNOSIS — N18.4 CKD (CHRONIC KIDNEY DISEASE) STAGE 4, GFR 15-29 ML/MIN (HCC): ICD-10-CM

## 2024-04-30 DIAGNOSIS — I50.32 CHRONIC HEART FAILURE WITH PRESERVED EJECTION FRACTION (HCC): Primary | Chronic | ICD-10-CM

## 2024-04-30 DIAGNOSIS — E89.89 LYMPHEDEMA OF UPPER EXTREMITY FOLLOWING LYMPHADENECTOMY: ICD-10-CM

## 2024-04-30 DIAGNOSIS — F32.A ANXIETY AND DEPRESSION: ICD-10-CM

## 2024-04-30 DIAGNOSIS — E11.42 DIABETIC POLYNEUROPATHY ASSOCIATED WITH TYPE 2 DIABETES MELLITUS (HCC): ICD-10-CM

## 2024-04-30 DIAGNOSIS — I89.0 LYMPHEDEMA OF UPPER EXTREMITY FOLLOWING LYMPHADENECTOMY: ICD-10-CM

## 2024-04-30 RX ORDER — BUTALBITAL, ACETAMINOPHEN AND CAFFEINE 50; 325; 40 MG/1; MG/1; MG/1
1 TABLET ORAL EVERY 6 HOURS PRN
Qty: 30 TABLET | Refills: 1 | Status: SHIPPED | OUTPATIENT
Start: 2024-04-30

## 2024-04-30 SDOH — SOCIAL STABILITY - SOCIAL INSECURITY: ACCULTURATION DIFFICULTY: Z60.3

## 2024-05-01 DIAGNOSIS — J45.30 MILD PERSISTENT ASTHMA WITHOUT COMPLICATION: ICD-10-CM

## 2024-05-02 RX ORDER — MONTELUKAST SODIUM 10 MG/1
10 TABLET ORAL
Qty: 30 TABLET | Refills: 0 | OUTPATIENT
Start: 2024-05-02

## 2024-05-05 PROBLEM — J96.21 ACUTE ON CHRONIC HYPOXIC RESPIRATORY FAILURE (HCC): Status: RESOLVED | Noted: 2024-03-13 | Resolved: 2024-05-05

## 2024-05-05 PROBLEM — N18.4 CKD (CHRONIC KIDNEY DISEASE) STAGE 4, GFR 15-29 ML/MIN (HCC): Status: RESOLVED | Noted: 2024-04-08 | Resolved: 2024-05-05

## 2024-05-05 PROBLEM — A41.9 SEVERE SEPSIS (HCC): Status: RESOLVED | Noted: 2024-03-14 | Resolved: 2024-05-05

## 2024-05-05 PROBLEM — R65.20 SEVERE SEPSIS (HCC): Status: RESOLVED | Noted: 2024-03-14 | Resolved: 2024-05-05

## 2024-05-05 PROBLEM — J18.9 MULTIFOCAL PNEUMONIA: Status: RESOLVED | Noted: 2020-03-25 | Resolved: 2024-05-05

## 2024-05-05 PROBLEM — R40.0 SOMNOLENCE: Status: RESOLVED | Noted: 2024-03-14 | Resolved: 2024-05-05

## 2024-05-05 PROBLEM — R06.89 HYPERCAPNIA: Status: RESOLVED | Noted: 2024-03-07 | Resolved: 2024-05-05

## 2024-05-05 PROBLEM — N18.31 STAGE 3A CHRONIC KIDNEY DISEASE (HCC): Status: ACTIVE | Noted: 2024-05-05

## 2024-05-05 RX ORDER — PREGABALIN 75 MG/1
75 CAPSULE ORAL 2 TIMES DAILY
COMMUNITY
Start: 2024-04-12

## 2024-05-05 RX ORDER — OXYCODONE AND ACETAMINOPHEN 10; 325 MG/1; MG/1
1 TABLET ORAL EVERY 6 HOURS PRN
COMMUNITY
Start: 2024-04-18

## 2024-05-05 RX ORDER — METHOCARBAMOL 750 MG/1
750 TABLET, FILM COATED ORAL 4 TIMES DAILY PRN
COMMUNITY
Start: 2024-04-15

## 2024-05-05 RX ORDER — ALLOPURINOL 100 MG/1
100 TABLET ORAL DAILY
COMMUNITY
Start: 2024-04-02

## 2024-05-09 ENCOUNTER — HOSPITAL ENCOUNTER (OUTPATIENT)
Dept: ONCOLOGY | Age: 54
Setting detail: INFUSION SERIES
Discharge: HOME | End: 2024-05-09
Payer: COMMERCIAL

## 2024-05-09 VITALS
TEMPERATURE: 97 F | SYSTOLIC BLOOD PRESSURE: 115 MMHG | DIASTOLIC BLOOD PRESSURE: 55 MMHG | RESPIRATION RATE: 18 BRPM | HEART RATE: 67 BPM

## 2024-05-09 DIAGNOSIS — E55.9 VITAMIN D DEFICIENCY: ICD-10-CM

## 2024-05-09 DIAGNOSIS — D50.9 IRON DEFICIENCY ANEMIA, UNSPECIFIED IRON DEFICIENCY ANEMIA TYPE: ICD-10-CM

## 2024-05-09 DIAGNOSIS — I50.32 CHRONIC HEART FAILURE WITH PRESERVED EJECTION FRACTION (HCC): ICD-10-CM

## 2024-05-09 DIAGNOSIS — I50.9 ACUTE ON CHRONIC CONGESTIVE HEART FAILURE, UNSPECIFIED HEART FAILURE TYPE (HCC): Primary | ICD-10-CM

## 2024-05-09 DIAGNOSIS — J45.30 MILD PERSISTENT ASTHMA WITHOUT COMPLICATION: ICD-10-CM

## 2024-05-09 LAB
ANION GAP SERPL CALCULATED.3IONS-SCNC: 15 MMOL/L (ref 3–16)
BUN SERPL-MCNC: 37 MG/DL (ref 7–20)
CALCIUM SERPL-MCNC: 9.1 MG/DL (ref 8.3–10.6)
CHLORIDE SERPL-SCNC: 96 MMOL/L (ref 99–110)
CO2 SERPL-SCNC: 28 MMOL/L (ref 21–32)
CREAT SERPL-MCNC: 1.6 MG/DL (ref 0.6–1.1)
GFR SERPLBLD CREATININE-BSD FMLA CKD-EPI: 38 ML/MIN/{1.73_M2}
GLUCOSE SERPL-MCNC: 221 MG/DL (ref 70–99)
NT-PROBNP SERPL-MCNC: 336 PG/ML (ref 0–124)
POTASSIUM SERPL-SCNC: 3.6 MMOL/L (ref 3.5–5.1)
SODIUM SERPL-SCNC: 139 MMOL/L (ref 136–145)

## 2024-05-09 PROCEDURE — 2580000003 HC RX 258: Performed by: INTERNAL MEDICINE

## 2024-05-09 PROCEDURE — 6360000002 HC RX W HCPCS: Performed by: INTERNAL MEDICINE

## 2024-05-09 PROCEDURE — 99211 OFF/OP EST MAY X REQ PHY/QHP: CPT

## 2024-05-09 PROCEDURE — 83880 ASSAY OF NATRIURETIC PEPTIDE: CPT

## 2024-05-09 PROCEDURE — 96365 THER/PROPH/DIAG IV INF INIT: CPT

## 2024-05-09 PROCEDURE — 80048 BASIC METABOLIC PNL TOTAL CA: CPT

## 2024-05-09 RX ORDER — FUROSEMIDE 10 MG/ML
120 INJECTION INTRAMUSCULAR; INTRAVENOUS ONCE
Status: COMPLETED | OUTPATIENT
Start: 2024-05-09 | End: 2024-05-09

## 2024-05-09 RX ORDER — FUROSEMIDE 10 MG/ML
120 INJECTION INTRAMUSCULAR; INTRAVENOUS ONCE
Start: 2024-05-23 | End: 2024-05-23

## 2024-05-09 RX ORDER — SODIUM CHLORIDE 0.9 % (FLUSH) 0.9 %
5-40 SYRINGE (ML) INJECTION ONCE
Start: 2024-05-23 | End: 2024-05-23

## 2024-05-09 RX ORDER — SODIUM CHLORIDE 0.9 % (FLUSH) 0.9 %
5-40 SYRINGE (ML) INJECTION ONCE
Status: COMPLETED | OUTPATIENT
Start: 2024-05-09 | End: 2024-05-09

## 2024-05-09 RX ADMIN — Medication 10 ML: at 14:07

## 2024-05-09 RX ADMIN — FUROSEMIDE 120 MG: 10 INJECTION, SOLUTION INTRAMUSCULAR; INTRAVENOUS at 14:07

## 2024-05-10 RX ORDER — MELATONIN
3000 DAILY
Qty: 270 TABLET | Refills: 1 | Status: SHIPPED | OUTPATIENT
Start: 2024-05-10

## 2024-05-10 RX ORDER — FERROUS SULFATE 325(65) MG
1 TABLET ORAL 2 TIMES DAILY WITH MEALS
Qty: 180 TABLET | Refills: 1 | Status: SHIPPED | OUTPATIENT
Start: 2024-05-10

## 2024-05-10 RX ORDER — MONTELUKAST SODIUM 10 MG/1
10 TABLET ORAL
Qty: 30 TABLET | Refills: 0 | OUTPATIENT
Start: 2024-05-10

## 2024-05-14 ENCOUNTER — TELEPHONE (OUTPATIENT)
Dept: CARDIOLOGY CLINIC | Age: 54
End: 2024-05-14

## 2024-05-14 NOTE — TELEPHONE ENCOUNTER
Spoke with  Bonner  Relayed Results     Pt received lasix infusion on Thursday of last week.  On Saturday she started gaining weight  As of today she is up 11 pounds with a weight of 213.    Spironolactone 50mg qd  Jardiance 10mg qd  Torsemide 100mg morning; 50mg six hours later.

## 2024-05-14 NOTE — TELEPHONE ENCOUNTER
----- Message from Jessa Parsons MD sent at 5/13/2024 10:27 PM EDT -----  Please call patient and let her know that her labs are stable.  Fluid level is stable.  ARETHA

## 2024-05-15 ENCOUNTER — TELEPHONE (OUTPATIENT)
Dept: PULMONOLOGY | Age: 54
End: 2024-05-15

## 2024-05-15 DIAGNOSIS — J45.30 MILD PERSISTENT ASTHMA WITHOUT COMPLICATION: ICD-10-CM

## 2024-05-15 NOTE — TELEPHONE ENCOUNTER
Sent order for Humidifier to [employer] who supplies her O2. Order scanned in her chart. Spoke to Janel and let him know [facility] would set this up and order was faxed.

## 2024-05-20 RX ORDER — MONTELUKAST SODIUM 10 MG/1
10 TABLET ORAL
Qty: 30 TABLET | Refills: 5 | Status: SHIPPED | OUTPATIENT
Start: 2024-05-20

## 2024-05-22 DIAGNOSIS — E03.2 HYPOTHYROIDISM DUE TO MEDICATION: ICD-10-CM

## 2024-05-23 ENCOUNTER — HOSPITAL ENCOUNTER (OUTPATIENT)
Dept: ONCOLOGY | Age: 54
Setting detail: INFUSION SERIES
Discharge: HOME | End: 2024-05-23
Payer: COMMERCIAL

## 2024-05-23 VITALS
DIASTOLIC BLOOD PRESSURE: 64 MMHG | SYSTOLIC BLOOD PRESSURE: 128 MMHG | RESPIRATION RATE: 18 BRPM | HEART RATE: 71 BPM | TEMPERATURE: 96.9 F

## 2024-05-23 DIAGNOSIS — I50.32 CHRONIC HEART FAILURE WITH PRESERVED EJECTION FRACTION (HCC): ICD-10-CM

## 2024-05-23 DIAGNOSIS — I50.9 ACUTE ON CHRONIC CONGESTIVE HEART FAILURE, UNSPECIFIED HEART FAILURE TYPE (HCC): Primary | ICD-10-CM

## 2024-05-23 LAB
ANION GAP SERPL CALCULATED.3IONS-SCNC: 12 MMOL/L (ref 3–16)
BUN SERPL-MCNC: 49 MG/DL (ref 7–20)
CALCIUM SERPL-MCNC: 8.8 MG/DL (ref 8.3–10.6)
CHLORIDE SERPL-SCNC: 91 MMOL/L (ref 99–110)
CO2 SERPL-SCNC: 29 MMOL/L (ref 21–32)
CREAT SERPL-MCNC: 1.5 MG/DL (ref 0.6–1.1)
GFR SERPLBLD CREATININE-BSD FMLA CKD-EPI: 41 ML/MIN/{1.73_M2}
GLUCOSE SERPL-MCNC: 592 MG/DL (ref 70–99)
NT-PROBNP SERPL-MCNC: 477 PG/ML (ref 0–124)
POTASSIUM SERPL-SCNC: 4.9 MMOL/L (ref 3.5–5.1)
SODIUM SERPL-SCNC: 132 MMOL/L (ref 136–145)

## 2024-05-23 PROCEDURE — 80048 BASIC METABOLIC PNL TOTAL CA: CPT

## 2024-05-23 PROCEDURE — 96374 THER/PROPH/DIAG INJ IV PUSH: CPT

## 2024-05-23 PROCEDURE — 6360000002 HC RX W HCPCS: Performed by: INTERNAL MEDICINE

## 2024-05-23 PROCEDURE — 2580000003 HC RX 258: Performed by: INTERNAL MEDICINE

## 2024-05-23 PROCEDURE — 99211 OFF/OP EST MAY X REQ PHY/QHP: CPT

## 2024-05-23 PROCEDURE — 83880 ASSAY OF NATRIURETIC PEPTIDE: CPT

## 2024-05-23 RX ORDER — SODIUM CHLORIDE 0.9 % (FLUSH) 0.9 %
5-40 SYRINGE (ML) INJECTION ONCE
Start: 2024-06-06 | End: 2024-06-06

## 2024-05-23 RX ORDER — LEVOTHYROXINE SODIUM 0.15 MG/1
150 TABLET ORAL
Qty: 90 TABLET | Refills: 0 | Status: SHIPPED | OUTPATIENT
Start: 2024-05-23

## 2024-05-23 RX ORDER — SODIUM CHLORIDE 0.9 % (FLUSH) 0.9 %
5-40 SYRINGE (ML) INJECTION ONCE
Status: COMPLETED | OUTPATIENT
Start: 2024-05-23 | End: 2024-05-23

## 2024-05-23 RX ORDER — FUROSEMIDE 10 MG/ML
120 INJECTION INTRAMUSCULAR; INTRAVENOUS ONCE
Start: 2024-06-06 | End: 2024-06-06

## 2024-05-23 RX ORDER — FUROSEMIDE 10 MG/ML
120 INJECTION INTRAMUSCULAR; INTRAVENOUS ONCE
Status: COMPLETED | OUTPATIENT
Start: 2024-05-23 | End: 2024-05-23

## 2024-05-23 RX ADMIN — FUROSEMIDE 120 MG: 10 INJECTION, SOLUTION INTRAMUSCULAR; INTRAVENOUS at 14:23

## 2024-05-23 RX ADMIN — Medication 10 ML: at 14:24

## 2024-05-28 ENCOUNTER — TELEPHONE (OUTPATIENT)
Dept: CARDIOLOGY CLINIC | Age: 54
End: 2024-05-28

## 2024-05-28 NOTE — TELEPHONE ENCOUNTER
Pt spouse states pt has gained weight and up to 215 lbs. Pt is having SOB, dizziness and light headedness. PT also having hard time walking. O2 levels drop in the low 70's when walking to Bathroom. Spouse is also asking about test results. Jacielkarinahenry number is [phone]. Please advise. Thank you.

## 2024-05-30 DIAGNOSIS — Z79.4 TYPE 2 DIABETES MELLITUS WITHOUT COMPLICATION, WITH LONG-TERM CURRENT USE OF INSULIN (HCC): ICD-10-CM

## 2024-05-30 DIAGNOSIS — E11.9 TYPE 2 DIABETES MELLITUS WITHOUT COMPLICATION, WITH LONG-TERM CURRENT USE OF INSULIN (HCC): ICD-10-CM

## 2024-05-31 ENCOUNTER — TELEPHONE (OUTPATIENT)
Dept: CARDIOLOGY CLINIC | Age: 54
End: 2024-05-31

## 2024-05-31 RX ORDER — ERTUGLIFLOZIN 5 MG/1
TABLET, FILM COATED ORAL
Qty: 90 TABLET | Refills: 1 | Status: SHIPPED | OUTPATIENT
Start: 2024-05-31

## 2024-05-31 NOTE — TELEPHONE ENCOUNTER
Pt's weight is still 215 after increasing torsemide. Pt's swelling is increasing in legs, hands and abdomen along with increased SOB. Please call to advise.

## 2024-06-03 NOTE — TELEPHONE ENCOUNTER
Spoke with Mr. Bonner    He will continue Torsemide 100mg BID until Lasix infusion later this week

## 2024-06-04 ENCOUNTER — TELEPHONE (OUTPATIENT)
Dept: ENDOCRINOLOGY | Age: 54
End: 2024-06-04

## 2024-06-04 NOTE — TELEPHONE ENCOUNTER
Call from [facility] on [address]    Stating pt was just prescribed Steglatro but she is also taking Jardiance from Dr Nhung Fernandez cardiologist. Please advise if pt should stop Jardiance?    [facility] # [phone]  - Melvina

## 2024-06-06 ENCOUNTER — HOSPITAL ENCOUNTER (OUTPATIENT)
Dept: ONCOLOGY | Age: 54
Setting detail: INFUSION SERIES
Discharge: HOME | End: 2024-06-06
Payer: COMMERCIAL

## 2024-06-06 VITALS
TEMPERATURE: 96.9 F | DIASTOLIC BLOOD PRESSURE: 56 MMHG | RESPIRATION RATE: 22 BRPM | HEART RATE: 64 BPM | SYSTOLIC BLOOD PRESSURE: 124 MMHG

## 2024-06-06 DIAGNOSIS — I50.32 CHRONIC HEART FAILURE WITH PRESERVED EJECTION FRACTION (HCC): ICD-10-CM

## 2024-06-06 DIAGNOSIS — I50.9 ACUTE ON CHRONIC CONGESTIVE HEART FAILURE, UNSPECIFIED HEART FAILURE TYPE (HCC): Primary | ICD-10-CM

## 2024-06-06 LAB
ANION GAP SERPL CALCULATED.3IONS-SCNC: 10 MMOL/L (ref 3–16)
BUN SERPL-MCNC: 40 MG/DL (ref 7–20)
CALCIUM SERPL-MCNC: 9.2 MG/DL (ref 8.3–10.6)
CHLORIDE SERPL-SCNC: 95 MMOL/L (ref 99–110)
CO2 SERPL-SCNC: 32 MMOL/L (ref 21–32)
CREAT SERPL-MCNC: 1.5 MG/DL (ref 0.6–1.1)
GFR SERPLBLD CREATININE-BSD FMLA CKD-EPI: 41 ML/MIN/{1.73_M2}
GLUCOSE SERPL-MCNC: 70 MG/DL (ref 70–99)
NT-PROBNP SERPL-MCNC: 403 PG/ML (ref 0–124)
POTASSIUM SERPL-SCNC: 4.2 MMOL/L (ref 3.5–5.1)
SODIUM SERPL-SCNC: 137 MMOL/L (ref 136–145)

## 2024-06-06 PROCEDURE — 80048 BASIC METABOLIC PNL TOTAL CA: CPT

## 2024-06-06 PROCEDURE — 96374 THER/PROPH/DIAG INJ IV PUSH: CPT

## 2024-06-06 PROCEDURE — 2580000003 HC RX 258: Performed by: INTERNAL MEDICINE

## 2024-06-06 PROCEDURE — 83880 ASSAY OF NATRIURETIC PEPTIDE: CPT

## 2024-06-06 PROCEDURE — 99211 OFF/OP EST MAY X REQ PHY/QHP: CPT

## 2024-06-06 PROCEDURE — 6360000002 HC RX W HCPCS: Performed by: INTERNAL MEDICINE

## 2024-06-06 RX ORDER — FUROSEMIDE 10 MG/ML
120 INJECTION INTRAMUSCULAR; INTRAVENOUS ONCE
Status: COMPLETED | OUTPATIENT
Start: 2024-06-06 | End: 2024-06-06

## 2024-06-06 RX ORDER — SODIUM CHLORIDE 0.9 % (FLUSH) 0.9 %
5-40 SYRINGE (ML) INJECTION ONCE
Status: CANCELLED
Start: 2024-06-20 | End: 2024-06-20

## 2024-06-06 RX ORDER — FUROSEMIDE 10 MG/ML
120 INJECTION INTRAMUSCULAR; INTRAVENOUS ONCE
Status: CANCELLED
Start: 2024-06-20 | End: 2024-06-20

## 2024-06-06 RX ORDER — SODIUM CHLORIDE 0.9 % (FLUSH) 0.9 %
5-40 SYRINGE (ML) INJECTION ONCE
Status: COMPLETED | OUTPATIENT
Start: 2024-06-06 | End: 2024-06-06

## 2024-06-06 RX ADMIN — FUROSEMIDE 120 MG: 10 INJECTION, SOLUTION INTRAMUSCULAR; INTRAVENOUS at 13:50

## 2024-06-06 RX ADMIN — Medication 10 ML: at 13:50

## 2024-06-09 NOTE — PROGRESS NOTES
automatic lymph machine which works better for her than compression hose.  Recommend still wearing hose after using the machine.    Rheumatoid arthritis involving multiple sites  She is on chronic pain meds.          PLAN:  All cardiac test and lab results personally reviewed by me during this office visit.  1.                      Time Based Itemization  A total of 40 minutes was spent on today's patient encounter.  If applicable, non-patient-facing activities:  ( x)Preparing to see the patient and reviewing records  (x ) Individual interpretation of results  (x ) Discussion or coordination of care with other health care professionals.    ( x) Ordering of unique tests, medications, or procedures  ( x) Documentation within the EHR                                             :      [facility]

## 2024-06-11 DIAGNOSIS — I10 ESSENTIAL HYPERTENSION: Chronic | ICD-10-CM

## 2024-06-11 DIAGNOSIS — N18.4 CKD (CHRONIC KIDNEY DISEASE) STAGE 4, GFR 15-29 ML/MIN (HCC): ICD-10-CM

## 2024-06-11 DIAGNOSIS — R06.02 SOB (SHORTNESS OF BREATH): ICD-10-CM

## 2024-06-11 DIAGNOSIS — I50.33 ACUTE ON CHRONIC DIASTOLIC CONGESTIVE HEART FAILURE (HCC): ICD-10-CM

## 2024-06-11 DIAGNOSIS — R60.0 LOCALIZED EDEMA: ICD-10-CM

## 2024-06-11 LAB
ANION GAP SERPL CALCULATED.3IONS-SCNC: 10 MMOL/L (ref 3–16)
BUN SERPL-MCNC: 52 MG/DL (ref 7–20)
CALCIUM SERPL-MCNC: 9.2 MG/DL (ref 8.3–10.6)
CHLORIDE SERPL-SCNC: 94 MMOL/L (ref 99–110)
CO2 SERPL-SCNC: 32 MMOL/L (ref 21–32)
CREAT SERPL-MCNC: 1.6 MG/DL (ref 0.6–1.1)
GFR SERPLBLD CREATININE-BSD FMLA CKD-EPI: 38 ML/MIN/{1.73_M2}
GLUCOSE SERPL-MCNC: 74 MG/DL (ref 70–99)
POTASSIUM SERPL-SCNC: 4.6 MMOL/L (ref 3.5–5.1)
SODIUM SERPL-SCNC: 136 MMOL/L (ref 136–145)

## 2024-06-11 NOTE — PROGRESS NOTES
Echo 3/7/24> LVEF 60%   ProBNP is improved at 198 from 445.  (6/6/24  Keep weight at 175-180 pounds: Torsemide 100mg in the am and 50mg in the evening.   ~Increase Torsemide to 100mg BID  ~Start once a week Metolazone 5mg 30 minutes before Lasix infusions.     Less than 174 pounds, take torsemide 50 mg twice daily. Continue        Coronary artery disease  Chronic chest pain usually relieved with 1-2 NTG. Not worsening.  [facility] 2014 with diffuse LAD disease and small vessel disease.       SOB (shortness of breath), chronic     -Continue Spironolactone and Torsemide   -She now does outpt labs.       Essential hypertension  BP (!) 122/56   Pulse 68   Ht 1.626 m (5' 4\")   Wt 100.2 kg (221 lb)   SpO2 96%   BMI 37.93 kg/m²   ~Stable.     LUIS (obstructive sleep apnea)  Uses CPAP therapy.      Hyperlipidemia  1/5/24> , HDL 47, LDL 88,   9/14/23 , HDL 30, LDL 88, . Controlled on Crestor 10mg.   3/10/23 , HDL 25, ,   4/26/22 , HDL 32, , .        Stop Zocor. Start Crestor 10mg daily.     Renal insufficiency, chronic  6/6/24> creat 1.5, BUN 40, K+ 4.2  6/22/23> creat 1.3 / BUN 36 / K+ 4.2      Lymphedema of upper extremity following lymphadenectomy:  L>R.     +++ chronic edema to left hand and arm as well as leg. Worse with weight gain  Try to keep them elevated. She also uses lymphedema machine.   She uses automatic lymph machine which works better for her than compression hose.  Recommend still wearing hose after using the machine.    Rheumatoid arthritis involving multiple sites  She is on chronic pain meds.          PLAN:  All cardiac test and lab results personally reviewed by me during this office visit.     Weekly Lasix 120mg IVP followed by 20mg hour x 4 hours. Faxed to Infusion Center.   2.    Take Metolazone 5mg once a week 30 minutes before Each Lasix Infusion.   3.    Take Torsemide 100mg Twice a day Today and continue this dose until your

## 2024-06-12 ENCOUNTER — OFFICE VISIT (OUTPATIENT)
Dept: CARDIOLOGY CLINIC | Age: 54
End: 2024-06-12

## 2024-06-12 ENCOUNTER — TELEPHONE (OUTPATIENT)
Dept: CARDIOLOGY CLINIC | Age: 54
End: 2024-06-12

## 2024-06-12 VITALS
BODY MASS INDEX: 37.73 KG/M2 | DIASTOLIC BLOOD PRESSURE: 56 MMHG | WEIGHT: 221 LBS | HEART RATE: 68 BPM | SYSTOLIC BLOOD PRESSURE: 122 MMHG | HEIGHT: 64 IN | OXYGEN SATURATION: 96 %

## 2024-06-12 DIAGNOSIS — I50.32 CHRONIC DIASTOLIC HEART FAILURE (HCC): Primary | ICD-10-CM

## 2024-06-12 DIAGNOSIS — I25.10 CORONARY ARTERY DISEASE INVOLVING NATIVE CORONARY ARTERY OF NATIVE HEART WITHOUT ANGINA PECTORIS: ICD-10-CM

## 2024-06-12 DIAGNOSIS — N18.4 CKD (CHRONIC KIDNEY DISEASE) STAGE 4, GFR 15-29 ML/MIN (HCC): ICD-10-CM

## 2024-06-12 DIAGNOSIS — E03.9 HYPOTHYROIDISM, UNSPECIFIED TYPE: ICD-10-CM

## 2024-06-12 DIAGNOSIS — R06.02 SOB (SHORTNESS OF BREATH): ICD-10-CM

## 2024-06-12 DIAGNOSIS — I10 ESSENTIAL HYPERTENSION: ICD-10-CM

## 2024-06-12 LAB — NT-PROBNP SERPL-MCNC: 244 PG/ML (ref 0–124)

## 2024-06-12 RX ORDER — METOLAZONE 5 MG/1
5 TABLET ORAL WEEKLY
Qty: 12 TABLET | Refills: 1 | Status: SHIPPED | OUTPATIENT
Start: 2024-06-12

## 2024-06-12 NOTE — TELEPHONE ENCOUNTER
Left message for the infusion Center informing them 2 faxes coming.  One with orders for Lasix 120mg IVP for 6/13/24.  Pt to arrive at Infusion Center at 2pm.     And one order with WEEKLY Lasix 120mg IVP followed by 20mg/hr x 4 hours with weekly labs of BMP, BNP.  Pt to take Metolazone 5mg 30 minutes prior to arrival.   Faxed to [phone].      Both faxes successful.  Pt also offered admission to the hospital today but declined because Sunday is a [place] holiday for them.  Pt is SOB and moaning during her office visit.   She has 4+ leg edema and some weepy from her right leg.

## 2024-06-12 NOTE — PATIENT INSTRUCTIONS
Weekly Lasix 120mg IVP followed by 20mg hour x 4 hours   2.    Take Metolazone 5mg once a week 30 minutes before Each Lasix Infusion.   3.    Take Torsemide 100mg Twice a day Today and continue this dose UNTIL your weight is at 205 pounds. Then decrease dose to 100mg in the morning and 50mg in the afternoon.       4.    Re check Thyroid Labs and CBC tomorrow.   5.    Elevate legs DAILY to help reduce swelling  6.    See Nhung in 2 weeks.     IV Lasix tomorrow at 2pm

## 2024-06-13 ENCOUNTER — TELEPHONE (OUTPATIENT)
Dept: CARDIOLOGY CLINIC | Age: 54
End: 2024-06-13

## 2024-06-13 ENCOUNTER — HOSPITAL ENCOUNTER (OUTPATIENT)
Dept: ONCOLOGY | Age: 54
Setting detail: INFUSION SERIES
Discharge: HOME | End: 2024-06-13
Payer: COMMERCIAL

## 2024-06-13 VITALS
DIASTOLIC BLOOD PRESSURE: 73 MMHG | HEART RATE: 77 BPM | OXYGEN SATURATION: 98 % | SYSTOLIC BLOOD PRESSURE: 134 MMHG | RESPIRATION RATE: 18 BRPM | TEMPERATURE: 97 F

## 2024-06-13 DIAGNOSIS — I10 ESSENTIAL HYPERTENSION: ICD-10-CM

## 2024-06-13 DIAGNOSIS — I50.32 CHRONIC DIASTOLIC HEART FAILURE (HCC): Primary | ICD-10-CM

## 2024-06-13 DIAGNOSIS — R06.02 SOB (SHORTNESS OF BREATH): ICD-10-CM

## 2024-06-13 DIAGNOSIS — I50.32 CHRONIC HEART FAILURE WITH PRESERVED EJECTION FRACTION (HCC): ICD-10-CM

## 2024-06-13 DIAGNOSIS — N18.4 CKD (CHRONIC KIDNEY DISEASE) STAGE 4, GFR 15-29 ML/MIN (HCC): ICD-10-CM

## 2024-06-13 DIAGNOSIS — E03.9 HYPOTHYROIDISM, UNSPECIFIED TYPE: ICD-10-CM

## 2024-06-13 DIAGNOSIS — I50.9 ACUTE ON CHRONIC CONGESTIVE HEART FAILURE, UNSPECIFIED HEART FAILURE TYPE (HCC): Primary | ICD-10-CM

## 2024-06-13 DIAGNOSIS — I50.32 CHRONIC DIASTOLIC HEART FAILURE (HCC): ICD-10-CM

## 2024-06-13 LAB
DEPRECATED RDW RBC AUTO: 14.6 % (ref 12.4–15.4)
HCT VFR BLD AUTO: 33.1 % (ref 36–48)
HGB BLD-MCNC: 10.7 G/DL (ref 12–16)
MCH RBC QN AUTO: 28 PG (ref 26–34)
MCHC RBC AUTO-ENTMCNC: 32.5 G/DL (ref 31–36)
MCV RBC AUTO: 86.4 FL (ref 80–100)
PLATELET # BLD AUTO: 221 K/UL (ref 135–450)
PMV BLD AUTO: 8.9 FL (ref 5–10.5)
RBC # BLD AUTO: 3.83 M/UL (ref 4–5.2)
T4 FREE SERPL-MCNC: 1.2 NG/DL (ref 0.9–1.8)
TSH SERPL DL<=0.005 MIU/L-ACNC: 2.02 UIU/ML (ref 0.27–4.2)
WBC # BLD AUTO: 6.2 K/UL (ref 4–11)

## 2024-06-13 PROCEDURE — 6360000002 HC RX W HCPCS: Performed by: INTERNAL MEDICINE

## 2024-06-13 PROCEDURE — 84443 ASSAY THYROID STIM HORMONE: CPT

## 2024-06-13 PROCEDURE — 96374 THER/PROPH/DIAG INJ IV PUSH: CPT

## 2024-06-13 PROCEDURE — 84439 ASSAY OF FREE THYROXINE: CPT

## 2024-06-13 PROCEDURE — 99211 OFF/OP EST MAY X REQ PHY/QHP: CPT

## 2024-06-13 PROCEDURE — 2580000003 HC RX 258: Performed by: INTERNAL MEDICINE

## 2024-06-13 PROCEDURE — 85027 COMPLETE CBC AUTOMATED: CPT

## 2024-06-13 RX ORDER — SODIUM CHLORIDE 0.9 % (FLUSH) 0.9 %
5-40 SYRINGE (ML) INJECTION ONCE
Status: COMPLETED | OUTPATIENT
Start: 2024-06-13 | End: 2024-06-13

## 2024-06-13 RX ORDER — TORSEMIDE 100 MG/1
100 TABLET ORAL 2 TIMES DAILY
Qty: 90 TABLET | Refills: 3 | Status: SHIPPED | OUTPATIENT
Start: 2024-06-13

## 2024-06-13 RX ORDER — FUROSEMIDE 10 MG/ML
120 INJECTION INTRAMUSCULAR; INTRAVENOUS ONCE
Status: CANCELLED
Start: 2024-06-20 | End: 2024-06-20

## 2024-06-13 RX ORDER — SODIUM CHLORIDE 0.9 % (FLUSH) 0.9 %
5-40 SYRINGE (ML) INJECTION ONCE
Status: CANCELLED
Start: 2024-06-20 | End: 2024-06-20

## 2024-06-13 RX ORDER — SODIUM CHLORIDE 0.9 % (FLUSH) 0.9 %
5-40 SYRINGE (ML) INJECTION ONCE
Start: 2024-06-20 | End: 2024-06-20

## 2024-06-13 RX ORDER — FUROSEMIDE 10 MG/ML
120 INJECTION INTRAMUSCULAR; INTRAVENOUS ONCE
Start: 2024-06-20 | End: 2024-06-20

## 2024-06-13 RX ORDER — FUROSEMIDE 10 MG/ML
120 INJECTION INTRAMUSCULAR; INTRAVENOUS ONCE
Status: COMPLETED | OUTPATIENT
Start: 2024-06-13 | End: 2024-06-13

## 2024-06-13 RX ADMIN — Medication 10 ML: at 14:19

## 2024-06-13 RX ADMIN — FUROSEMIDE 120 MG: 10 INJECTION, SOLUTION INTRAMUSCULAR; INTRAVENOUS at 14:19

## 2024-06-13 NOTE — PROGRESS NOTES
Pt to Dept for labs and lasix IVP. Pt was seen in office 6/12, With new orders for outpatient lasix orders sent . IV started with labs drawn. Lasix 120mg given IVP over 6 minutes. Pt eliana IVP with no adverse reaction noted. Pt was instructed to take metolazone 30 minutes prior to Lasix, verified dose was taken with Patient. AVS printed and reviewed. Pt to return next week for Lasix IVP followed by a Lasix gtt. Pt discharged home with .

## 2024-06-13 NOTE — TELEPHONE ENCOUNTER
Per ARETHA, pt is on Torsemide, not Lasix.       Called and left message with pt. Then called infusion center and spoke to Deysi RN and then spoke with patient.  He actually was saying he needs more Torsemide not Furosemide.   Refills sent to pharmacy.

## 2024-06-13 NOTE — TELEPHONE ENCOUNTER
Medication Refill    Medication needing refilled:  furosemide (LASIX)     Dosage of the medication:  50mg    How are you taking this medication (QD, BID, TID, QID, PRN): 2 a day    30 or 90 day supply called in:  90     When will you run out of your medication:    Which Pharmacy are we sending the medication to?:    [id number] - [place], [place] - [address] - P [phone] - F [phone] [address] [address]  Phone: [phone]  Fax: [phone]       Pt states they are on this med but I cannot find it in their med list. Please advise.

## 2024-06-14 ENCOUNTER — TELEPHONE (OUTPATIENT)
Dept: CARDIOLOGY CLINIC | Age: 54
End: 2024-06-14

## 2024-06-14 NOTE — TELEPHONE ENCOUNTER
Asked  Mr Bonner to call and give us an update on how Crow is feeling since her Lasix infusion yesterday.    Labs are stable

## 2024-06-14 NOTE — TELEPHONE ENCOUNTER
----- Message from Jessa Parsons MD sent at 6/14/2024  1:17 PM EDT -----  Please call and see how she is doing.  Let them know that her labs from yesterday looks good.  ARETHA

## 2024-06-19 NOTE — TELEPHONE ENCOUNTER
Spoke to Mr Ha.  He states she is breathing better now.      Weight today 207. (12 pound weight loss since office visit June 12th.  Weight was 219 )      Reminded about decrease dose of Torsemide to 100mg in the morning and 50mg in the afternoon when weight is at 205 pounds.     Next Lasix infusion 120mg IVP followed by 20mg hr x 4hrs tomorrow on 6/20 at 10:30.  Pt will take Metolazone 5mg at 10am.  Labs BMP, BNP for tomorrow and with each weekly infusion.     Goal weight 195.    Appt with KVNP on 7/1.          No

## 2024-06-20 ENCOUNTER — APPOINTMENT (OUTPATIENT)
Dept: ONCOLOGY | Age: 54
End: 2024-06-20
Payer: COMMERCIAL

## 2024-06-20 ENCOUNTER — HOSPITAL ENCOUNTER (OUTPATIENT)
Dept: ONCOLOGY | Age: 54
Setting detail: INFUSION SERIES
Discharge: HOME | End: 2024-06-20
Payer: COMMERCIAL

## 2024-06-20 ENCOUNTER — TELEPHONE (OUTPATIENT)
Dept: CARDIOLOGY CLINIC | Age: 54
End: 2024-06-20

## 2024-06-20 VITALS
TEMPERATURE: 97.4 F | OXYGEN SATURATION: 98 % | WEIGHT: 206.5 LBS | BODY MASS INDEX: 35.26 KG/M2 | RESPIRATION RATE: 18 BRPM | DIASTOLIC BLOOD PRESSURE: 64 MMHG | SYSTOLIC BLOOD PRESSURE: 116 MMHG | HEIGHT: 64 IN | HEART RATE: 66 BPM

## 2024-06-20 DIAGNOSIS — I50.32 CHRONIC HEART FAILURE WITH PRESERVED EJECTION FRACTION (HCC): ICD-10-CM

## 2024-06-20 DIAGNOSIS — R06.02 SOB (SHORTNESS OF BREATH): ICD-10-CM

## 2024-06-20 DIAGNOSIS — I50.9 ACUTE ON CHRONIC CONGESTIVE HEART FAILURE, UNSPECIFIED HEART FAILURE TYPE (HCC): Primary | ICD-10-CM

## 2024-06-20 LAB
ANION GAP SERPL CALCULATED.3IONS-SCNC: 13 MMOL/L (ref 3–16)
BUN SERPL-MCNC: 47 MG/DL (ref 7–20)
CALCIUM SERPL-MCNC: 9.1 MG/DL (ref 8.3–10.6)
CHLORIDE SERPL-SCNC: 89 MMOL/L (ref 99–110)
CO2 SERPL-SCNC: 31 MMOL/L (ref 21–32)
CREAT SERPL-MCNC: 1.7 MG/DL (ref 0.6–1.1)
GFR SERPLBLD CREATININE-BSD FMLA CKD-EPI: 35 ML/MIN/{1.73_M2}
GLUCOSE SERPL-MCNC: 523 MG/DL (ref 70–99)
NT-PROBNP SERPL-MCNC: 488 PG/ML (ref 0–124)
POTASSIUM SERPL-SCNC: 4.6 MMOL/L (ref 3.5–5.1)
SODIUM SERPL-SCNC: 133 MMOL/L (ref 136–145)

## 2024-06-20 PROCEDURE — 2580000003 HC RX 258: Performed by: INTERNAL MEDICINE

## 2024-06-20 PROCEDURE — 96365 THER/PROPH/DIAG IV INF INIT: CPT

## 2024-06-20 PROCEDURE — 6360000002 HC RX W HCPCS: Performed by: INTERNAL MEDICINE

## 2024-06-20 PROCEDURE — 99211 OFF/OP EST MAY X REQ PHY/QHP: CPT

## 2024-06-20 PROCEDURE — 96366 THER/PROPH/DIAG IV INF ADDON: CPT

## 2024-06-20 PROCEDURE — 83880 ASSAY OF NATRIURETIC PEPTIDE: CPT

## 2024-06-20 PROCEDURE — 96375 TX/PRO/DX INJ NEW DRUG ADDON: CPT

## 2024-06-20 PROCEDURE — 80048 BASIC METABOLIC PNL TOTAL CA: CPT

## 2024-06-20 RX ORDER — FUROSEMIDE 10 MG/ML
120 INJECTION INTRAMUSCULAR; INTRAVENOUS ONCE
Status: COMPLETED | OUTPATIENT
Start: 2024-06-20 | End: 2024-06-20

## 2024-06-20 RX ORDER — FUROSEMIDE 10 MG/ML
120 INJECTION INTRAMUSCULAR; INTRAVENOUS ONCE
Status: CANCELLED
Start: 2024-06-27 | End: 2024-06-27

## 2024-06-20 RX ORDER — SODIUM CHLORIDE 0.9 % (FLUSH) 0.9 %
5-40 SYRINGE (ML) INJECTION ONCE
Status: COMPLETED | OUTPATIENT
Start: 2024-06-20 | End: 2024-06-20

## 2024-06-20 RX ORDER — SODIUM CHLORIDE 0.9 % (FLUSH) 0.9 %
5-40 SYRINGE (ML) INJECTION ONCE
Status: CANCELLED
Start: 2024-06-27 | End: 2024-06-27

## 2024-06-20 RX ADMIN — FUROSEMIDE 120 MG: 10 INJECTION, SOLUTION INTRAMUSCULAR; INTRAVENOUS at 10:51

## 2024-06-20 RX ADMIN — Medication 10 ML: at 10:45

## 2024-06-20 RX ADMIN — FUROSEMIDE 20 MG/HR: 10 INJECTION, SOLUTION INTRAMUSCULAR; INTRAVENOUS at 11:21

## 2024-06-20 NOTE — PROGRESS NOTES
Pt to Dept with O2 intact at 2L/NCand aid of cane, accompanied by  for labs, lasix IVP to be followed by a Lasix infusion at 20 mh/hr for 4 hours. IV started with labs drawn. Lasix 120mg given IVP over 8 minutes. Pt eliana IVP with no adverse reaction noted. Lasix infusion started.. Call light in reach.  Pt took, as instructed per ordering  physician, metolazone 30 minutes prior to Lasix, verified dose was taken with Patient. AVS reviewed about lasix and denied need for hard copy. Pt discharged home with .

## 2024-06-20 NOTE — TELEPHONE ENCOUNTER
Wt today 204 per  Ha in the morning.     Call when wt is at 195.   agreeable.   SARAN reviewed labs.

## 2024-06-25 ENCOUNTER — TELEPHONE (OUTPATIENT)
Dept: INTERNAL MEDICINE CLINIC | Age: 54
End: 2024-06-25

## 2024-06-25 NOTE — TELEPHONE ENCOUNTER
Very Weak cannot walk without help and dizzy all the time.  is afraid patient will fall. Has been going on about 2 weeks now. Has happened before but now it has lasted longer. Offered appt today with a covering provider and spouse declined for patient stating she has a dentist appt. Informed spouse if weakness is occurring should be seen by PCP office over a dental visit, still declined. Patient scheduled with PCP tomorrow . Informed if any issues increase need to go directly to ED.

## 2024-06-26 ENCOUNTER — HOSPITAL ENCOUNTER (OUTPATIENT)
Dept: GENERAL RADIOLOGY | Age: 54
Discharge: HOME | End: 2024-06-26
Payer: COMMERCIAL

## 2024-06-26 ENCOUNTER — OFFICE VISIT (OUTPATIENT)
Dept: INTERNAL MEDICINE CLINIC | Age: 54
End: 2024-06-26

## 2024-06-26 ENCOUNTER — HOSPITAL ENCOUNTER (OUTPATIENT)
Age: 54
Discharge: HOME | End: 2024-06-26
Payer: COMMERCIAL

## 2024-06-26 ENCOUNTER — TELEPHONE (OUTPATIENT)
Dept: CARDIOLOGY CLINIC | Age: 54
End: 2024-06-26

## 2024-06-26 ENCOUNTER — CARE COORDINATION (OUTPATIENT)
Dept: CARE COORDINATION | Age: 54
End: 2024-06-26

## 2024-06-26 VITALS
HEIGHT: 64 IN | HEART RATE: 76 BPM | DIASTOLIC BLOOD PRESSURE: 80 MMHG | BODY MASS INDEX: 36.02 KG/M2 | WEIGHT: 211 LBS | SYSTOLIC BLOOD PRESSURE: 138 MMHG | OXYGEN SATURATION: 95 %

## 2024-06-26 DIAGNOSIS — N18.31 STAGE 3A CHRONIC KIDNEY DISEASE (HCC): ICD-10-CM

## 2024-06-26 DIAGNOSIS — J45.50 SEVERE PERSISTENT ASTHMA WITHOUT COMPLICATION: ICD-10-CM

## 2024-06-26 DIAGNOSIS — R33.9 URINE RETENTION: ICD-10-CM

## 2024-06-26 DIAGNOSIS — R29.898 WEAKNESS OF RIGHT UPPER EXTREMITY: ICD-10-CM

## 2024-06-26 DIAGNOSIS — I50.32 CHRONIC DIASTOLIC HEART FAILURE (HCC): ICD-10-CM

## 2024-06-26 DIAGNOSIS — Z79.4 TYPE 2 DIABETES MELLITUS WITH OTHER SPECIFIED COMPLICATION, WITH LONG-TERM CURRENT USE OF INSULIN (HCC): ICD-10-CM

## 2024-06-26 DIAGNOSIS — R06.02 SHORTNESS OF BREATH: Primary | ICD-10-CM

## 2024-06-26 DIAGNOSIS — K44.9 HIATAL HERNIA WITH GERD: ICD-10-CM

## 2024-06-26 DIAGNOSIS — I50.32 CHRONIC HEART FAILURE WITH PRESERVED EJECTION FRACTION (HCC): Chronic | ICD-10-CM

## 2024-06-26 DIAGNOSIS — E11.69 TYPE 2 DIABETES MELLITUS WITH OTHER SPECIFIED COMPLICATION, WITH LONG-TERM CURRENT USE OF INSULIN (HCC): ICD-10-CM

## 2024-06-26 DIAGNOSIS — R47.1 DYSARTHRIA: ICD-10-CM

## 2024-06-26 DIAGNOSIS — M54.42 ACUTE LEFT-SIDED LOW BACK PAIN WITH LEFT-SIDED SCIATICA: ICD-10-CM

## 2024-06-26 DIAGNOSIS — I50.32 CHRONIC HEART FAILURE WITH PRESERVED EJECTION FRACTION (HCC): ICD-10-CM

## 2024-06-26 DIAGNOSIS — Z85.3 HX OF BREAST CANCER: ICD-10-CM

## 2024-06-26 DIAGNOSIS — R41.3 MEMORY PROBLEM: ICD-10-CM

## 2024-06-26 DIAGNOSIS — R06.02 SHORTNESS OF BREATH: ICD-10-CM

## 2024-06-26 DIAGNOSIS — R29.898 LEFT LEG WEAKNESS: ICD-10-CM

## 2024-06-26 DIAGNOSIS — J45.51 SEVERE PERSISTENT ASTHMA WITH ACUTE EXACERBATION: ICD-10-CM

## 2024-06-26 DIAGNOSIS — K21.9 HIATAL HERNIA WITH GERD: ICD-10-CM

## 2024-06-26 LAB
ANION GAP SERPL CALCULATED.3IONS-SCNC: 11 MMOL/L (ref 3–16)
BASOPHILS # BLD: 0 K/UL (ref 0–0.2)
BASOPHILS NFR BLD: 0.4 %
BUN SERPL-MCNC: 69 MG/DL (ref 7–20)
CALCIUM SERPL-MCNC: 9.1 MG/DL (ref 8.3–10.6)
CHLORIDE SERPL-SCNC: 90 MMOL/L (ref 99–110)
CO2 SERPL-SCNC: 34 MMOL/L (ref 21–32)
CREAT SERPL-MCNC: 1.6 MG/DL (ref 0.6–1.1)
DEPRECATED RDW RBC AUTO: 14.8 % (ref 12.4–15.4)
EOSINOPHIL # BLD: 0.1 K/UL (ref 0–0.6)
EOSINOPHIL NFR BLD: 1.5 %
GFR SERPLBLD CREATININE-BSD FMLA CKD-EPI: 38 ML/MIN/{1.73_M2}
GLUCOSE SERPL-MCNC: 148 MG/DL (ref 70–99)
HCT VFR BLD AUTO: 33.7 % (ref 36–48)
HGB BLD-MCNC: 11 G/DL (ref 12–16)
LYMPHOCYTES # BLD: 1.7 K/UL (ref 1–5.1)
LYMPHOCYTES NFR BLD: 21.5 %
MCH RBC QN AUTO: 28 PG (ref 26–34)
MCHC RBC AUTO-ENTMCNC: 32.5 G/DL (ref 31–36)
MCV RBC AUTO: 86 FL (ref 80–100)
MONOCYTES # BLD: 0.7 K/UL (ref 0–1.3)
MONOCYTES NFR BLD: 9 %
NEUTROPHILS # BLD: 5.3 K/UL (ref 1.7–7.7)
NEUTROPHILS NFR BLD: 67.6 %
NT-PROBNP SERPL-MCNC: 449 PG/ML (ref 0–124)
PLATELET # BLD AUTO: 244 K/UL (ref 135–450)
PMV BLD AUTO: 9.9 FL (ref 5–10.5)
POTASSIUM SERPL-SCNC: 3.9 MMOL/L (ref 3.5–5.1)
RBC # BLD AUTO: 3.91 M/UL (ref 4–5.2)
SODIUM SERPL-SCNC: 135 MMOL/L (ref 136–145)
WBC # BLD AUTO: 7.9 K/UL (ref 4–11)

## 2024-06-26 PROCEDURE — 71046 X-RAY EXAM CHEST 2 VIEWS: CPT

## 2024-06-26 RX ORDER — PREDNISONE 10 MG/1
TABLET ORAL
Qty: 30 TABLET | Refills: 0 | Status: SHIPPED | OUTPATIENT
Start: 2024-06-26 | End: 2024-07-08

## 2024-06-26 RX ORDER — LIDOCAINE HCL 4% 4 G/100G
CREAM TOPICAL
Qty: 133 G | Refills: 1 | Status: SHIPPED | OUTPATIENT
Start: 2024-06-26

## 2024-06-26 RX ORDER — ALBUTEROL SULFATE 2.5 MG/3ML
2.5 SOLUTION RESPIRATORY (INHALATION) ONCE
Status: COMPLETED | OUTPATIENT
Start: 2024-06-26 | End: 2024-06-26

## 2024-06-26 RX ADMIN — ALBUTEROL SULFATE 2.5 MG: 2.5 SOLUTION RESPIRATORY (INHALATION) at 10:04

## 2024-06-26 NOTE — TELEPHONE ENCOUNTER
Spoke to pt  and he is agreeable to bringing her to the office for a HF weight before her infusion tomorrow.  Just a quick weight for comparison.  Wt at home 211. This is a 7 pound wt gain in one week on their scale.     Last Infusion wt on 6/20/24 was 206 pounds.       Discussed to continue same meds of Torsemide 100mg BID and metolazone 5mg 30 minutes before her Lasix infusion.

## 2024-06-26 NOTE — CARE COORDINATION
Ambulatory Care Coordination Note     6/26/2024 1:15 PM     Patient outreach attempt by this AC today to offer care management services. ACM was unable to reach the patient by telephone today; left voice message requesting a return phone call to this ACM.     ACM: Deysi Leyva RN     Care Summary Note:     Pcp referral to care coordination and RPM. Attempt #1    PCP/Specialist follow up:   Future Appointments         Provider Specialty Dept Phone    6/27/2024 10:30 AM ROOM 3A - [facility] [phone]    7/1/2024 3:00 PM Nhung Fernandez APRN - CNP Cardiology [phone]    7/3/2024 10:30 AM ROOM 2A - [facility] [phone]    7/11/2024 10:30 AM ROOM 2A - [facility] [phone]    7/18/2024 10:30 AM ROOM 3A - [facility] [phone]    7/25/2024 10:30 AM ROOM 3A - [facility] [phone]    7/29/2024 8:20 AM Mariana Simental DO Internal Medicine [phone]    7/31/2024 10:30 AM ROOM 3A - [facility] [phone]    8/1/2024 3:20 PM Lizzy Taylor MD Endocrinology [phone]    10/16/2024 1:00 PM Dior Olivo MD Pulmonology [phone]            Follow Up:   Plan for next AC outreach in approximately 1 week to complete:  - outreach attempt to offer care management services  - RPM.

## 2024-06-26 NOTE — TELEPHONE ENCOUNTER
Wt today 211.6 6/20  since per Mr. Bonner in the morning.     Mr Bonner would also like the labs results

## 2024-06-26 NOTE — TELEPHONE ENCOUNTER
Her labs look stable, kidney function and BNP slightly elevated. Continue torsemide 100mg bid and metolazone before lasix infusion tomorrow. RENAE

## 2024-06-26 NOTE — PATIENT INSTRUCTIONS
Check Chest Xray to check on status of lungs. I am looking for possible pneumonia vs fluid build up.   I have started you on prednisone to help with your asthma and sciatica.   Please let Dr. Taylor know that you will be taking Prednisone for the next 12 days.   I have ordered blood work to check your blood count, kidneys, and heart failure test.   I have ordered an MRI of your low back as I am worried about a pinched nerve in your spine.  Alternatively, diabetes can some times cause the pain and weakness that you are having in your leg. If the MRI looks ok, I will order a nerve test to look for this.   Please let Dr. Ely know about your left leg pain.   Continue to use Aspercreme with Lidocaine to help with your leg pain.   Wear your oxygen all of the time.    I have ordered an MRI of your brain to look for possible causes of your speech difficulties and arm weakness. I do not think that it is related to your leg.   Please let Dr. Marshall know about the difficulty that you are having urinating.

## 2024-06-26 NOTE — PROGRESS NOTES
TAKE 1 TABLET BY MOUTH TWO TIMES A DAY (Patient taking differently: Take 1 tablet by mouth 2 times daily Do not crush or break.) 180 tablet 3    fluticasone-salmeterol (ADVAIR DISKUS) 250-50 MCG/ACT AEPB diskus inhaler Inhale 1 puff into the lungs 2 times daily 3 each 3    meclizine (ANTIVERT) 25 MG tablet Take 1 tablet by mouth 3 times daily as needed for Dizziness 30 tablet 2    polyethylene glycol (GLYCOLAX) 17 GM/SCOOP powder TAKE 17 GRAMS (1 CAPFUL) BY MOUTH NIGHTLY 510 g 0    clopidogrel (PLAVIX) 75 MG tablet Take 1 tablet by mouth daily 30 tablet 3    Compression Sleeves Left arm 2 each 0    Elastic Bandages & Supports (COMPRESSION & SUPPORT GLOVES L) MISC 1 each by Does not apply route daily Left hand 2 each 0    Elastic Bandages & Supports (MEDICAL COMPRESSION STOCKINGS) MISC 2 each by Does not apply route daily On am/off HS. 2 each 0    Urea 40 % LOTN Apply to feet nightly and cover with Aquaphor 325 mL 5    SM SENNA-S 8.6-50 MG per tablet TAKE 2 TABLETS BY MOUTH TWO TIMES A DAY (Patient taking differently: Take 2 tablets by mouth 2 times daily) 360 tablet 5    Continuous Blood Gluc Sensor (FREESTYLE EMERALD 2 SENSOR) MISC Change every 14 days 2 each 5    Blood Glucose Monitoring Suppl (ONETOUCH VERIO) w/Device KIT Use to check glucose 4 times daily. 1 kit 0    Compression Stockings MISC by Does not apply route Zippered stockings for daily use on lower extremities (do not wear at night). 20-30mmHg. 1 each 1    Continuous Blood Gluc  (FREESTYLE EMERALD 2 READER) MINDY To check glucose levels 1 each 0    albuterol (PROVENTIL) (2.5 MG/3ML) 0.083% nebulizer solution Take 3 mLs by nebulization every 6 hours as needed for Wheezing or Shortness of Breath 120 each 1    brexpiprazole (REXULTI) 4 MG TABS tablet Take 0.5 tablets by mouth at bedtime      loratadine (CLARITIN) 10 MG tablet TAKE 1 TABLET BY MOUTH ONE TIME A DAY  (Patient taking differently: Take 1 tablet by mouth daily) 30 tablet 5    OXYGEN Inhale 2

## 2024-06-26 NOTE — TELEPHONE ENCOUNTER
Nhung you are seeing her tomorrow.  Spouse is calling in with pt's weight of 211#    Plan from 6/12 visit   Weekly Lasix 120mg IVP followed by 20mg hour x 4 hours   2.    Take Metolazone 5mg once a week 30 minutes before Each Lasix Infusion.   3.    Take Torsemide 100mg Twice a day Today and continue this dose UNTIL your weight is at 205 pounds. Then decrease dose to 100mg in the morning and 50mg in the afternoon.         4.    Re check Thyroid Labs and CBC tomorrow.   5.    Elevate legs DAILY to help reduce swelling  6.    See Nhung in 2 weeks.      IV Lasix 120mg tomorrow at 2pm

## 2024-06-27 ENCOUNTER — TELEPHONE (OUTPATIENT)
Dept: CARDIOLOGY CLINIC | Age: 54
End: 2024-06-27

## 2024-06-27 ENCOUNTER — HOSPITAL ENCOUNTER (OUTPATIENT)
Dept: ONCOLOGY | Age: 54
Setting detail: INFUSION SERIES
Discharge: HOME | End: 2024-06-27
Payer: COMMERCIAL

## 2024-06-27 VITALS
WEIGHT: 207 LBS | TEMPERATURE: 97.6 F | HEART RATE: 70 BPM | OXYGEN SATURATION: 99 % | HEIGHT: 64 IN | SYSTOLIC BLOOD PRESSURE: 138 MMHG | RESPIRATION RATE: 16 BRPM | DIASTOLIC BLOOD PRESSURE: 70 MMHG | BODY MASS INDEX: 35.34 KG/M2

## 2024-06-27 DIAGNOSIS — R06.02 SOB (SHORTNESS OF BREATH): ICD-10-CM

## 2024-06-27 DIAGNOSIS — I50.9 ACUTE ON CHRONIC CONGESTIVE HEART FAILURE, UNSPECIFIED HEART FAILURE TYPE (HCC): Primary | ICD-10-CM

## 2024-06-27 DIAGNOSIS — I50.32 CHRONIC HEART FAILURE WITH PRESERVED EJECTION FRACTION (HCC): ICD-10-CM

## 2024-06-27 LAB
ANION GAP SERPL CALCULATED.3IONS-SCNC: 13 MMOL/L (ref 3–16)
BUN SERPL-MCNC: 57 MG/DL (ref 7–20)
CALCIUM SERPL-MCNC: 9.6 MG/DL (ref 8.3–10.6)
CHLORIDE SERPL-SCNC: 98 MMOL/L (ref 99–110)
CO2 SERPL-SCNC: 30 MMOL/L (ref 21–32)
CREAT SERPL-MCNC: 1.5 MG/DL (ref 0.6–1.1)
GFR SERPLBLD CREATININE-BSD FMLA CKD-EPI: 41 ML/MIN/{1.73_M2}
GLUCOSE BLD-MCNC: 110 MG/DL (ref 70–99)
GLUCOSE BLD-MCNC: 136 MG/DL (ref 70–99)
GLUCOSE BLD-MCNC: 41 MG/DL (ref 70–99)
GLUCOSE BLD-MCNC: 41 MG/DL (ref 70–99)
GLUCOSE SERPL-MCNC: 161 MG/DL (ref 70–99)
NT-PROBNP SERPL-MCNC: 616 PG/ML (ref 0–124)
PERFORMED ON: ABNORMAL
POTASSIUM SERPL-SCNC: 3.6 MMOL/L (ref 3.5–5.1)
SODIUM SERPL-SCNC: 141 MMOL/L (ref 136–145)

## 2024-06-27 PROCEDURE — 6360000002 HC RX W HCPCS: Performed by: INTERNAL MEDICINE

## 2024-06-27 PROCEDURE — 80048 BASIC METABOLIC PNL TOTAL CA: CPT

## 2024-06-27 PROCEDURE — 99211 OFF/OP EST MAY X REQ PHY/QHP: CPT

## 2024-06-27 PROCEDURE — 2580000003 HC RX 258: Performed by: INTERNAL MEDICINE

## 2024-06-27 PROCEDURE — 96365 THER/PROPH/DIAG IV INF INIT: CPT

## 2024-06-27 PROCEDURE — 83880 ASSAY OF NATRIURETIC PEPTIDE: CPT

## 2024-06-27 PROCEDURE — 96375 TX/PRO/DX INJ NEW DRUG ADDON: CPT

## 2024-06-27 RX ORDER — OMEPRAZOLE 20 MG/1
CAPSULE, DELAYED RELEASE ORAL
Qty: 180 CAPSULE | Refills: 0 | Status: SHIPPED | OUTPATIENT
Start: 2024-06-27

## 2024-06-27 RX ORDER — FUROSEMIDE 10 MG/ML
120 INJECTION INTRAMUSCULAR; INTRAVENOUS ONCE
Status: CANCELLED
Start: 2024-07-04 | End: 2024-07-04

## 2024-06-27 RX ORDER — SODIUM CHLORIDE 0.9 % (FLUSH) 0.9 %
5-40 SYRINGE (ML) INJECTION ONCE
Status: CANCELLED
Start: 2024-07-04 | End: 2024-07-04

## 2024-06-27 RX ORDER — RANOLAZINE 500 MG/1
500 TABLET, EXTENDED RELEASE ORAL 2 TIMES DAILY
Qty: 180 TABLET | Refills: 3 | Status: SHIPPED | OUTPATIENT
Start: 2024-06-27

## 2024-06-27 RX ORDER — SODIUM CHLORIDE 0.9 % (FLUSH) 0.9 %
5-40 SYRINGE (ML) INJECTION ONCE
Status: COMPLETED | OUTPATIENT
Start: 2024-06-27 | End: 2024-06-27

## 2024-06-27 RX ORDER — FUROSEMIDE 10 MG/ML
120 INJECTION INTRAMUSCULAR; INTRAVENOUS ONCE
Status: COMPLETED | OUTPATIENT
Start: 2024-06-27 | End: 2024-06-27

## 2024-06-27 RX ADMIN — FUROSEMIDE 120 MG: 10 INJECTION, SOLUTION INTRAMUSCULAR; INTRAVENOUS at 10:52

## 2024-06-27 RX ADMIN — FUROSEMIDE 20 MG/HR: 10 INJECTION, SOLUTION INTRAMUSCULAR; INTRAVENOUS at 11:08

## 2024-06-27 RX ADMIN — Medication 10 ML: at 10:53

## 2024-06-27 NOTE — PROGRESS NOTES
Pt is beginning to feel better. Skin is now warm and dry. Pt is more awake and able to converse appropriately.

## 2024-06-27 NOTE — PROGRESS NOTES
Patient complaining that her blood sugar feels low. Pt skin is clammy, pt drowsy.Noted intermmitent tremors to right upper extremity. Pt  expressed the tremors of her norm. Blood sugar is 41 per accucheck meter. Blood sugar repeated with accu meter resulting with another blood sugar level 41. Orange juice with 1 pack of sugar given. Pt able to drink and swallow without difficulties. Deysi spoke with Dr Parsons's nurse, Naty. Orders noted to continue with 2nd orange juice and sugar along with peanut butter and hanna crackers. Then to recheck Blood sugar in 20-30 min and call if blood sugar does not increase or symptoms do not improve.

## 2024-06-27 NOTE — PROGRESS NOTES
Pt arrived to infusion center with aid of wheel chair related to SOB and tiredness. Pt is accompanied by . Pt to receive Lasix IVP followed by a Lasix infusion. Peipheral IV started per Deysi Palencia RN.

## 2024-06-27 NOTE — TELEPHONE ENCOUNTER
Pt into the office for weight before IV lasix infusion.  Weight in HF office was 209.4.  Oxygen not weighed.   Called infusion center and left message with today's weight in HF office.  Requesting pt still be weighted on their scale as well for comparison.

## 2024-06-27 NOTE — PROGRESS NOTES
Pt tolerated lasix infusion. Pt expresses that she feels fine and OK to be discharged to home. Blood sugar repeated 136 per accu meter. Instructed patient and  that patient needs to eat protein prior to Lasix infusion treatments and to bring food from home that she likes to eat to help prevent a decrease in blood sugar. Also to follow up with her physician if symptoms recur. Comprehension noted. Pt discharged to car per wheel chair.

## 2024-06-27 NOTE — PROGRESS NOTES
[facility]   Congestive Heart Failure    PrimaryCare Doctor:  Mariana Simental DO      Chief Complaint:  CHF    History of Present Illness:  Crow Bonner is a 54 y.o. female with PMH breast cancer, chronic lymphedema, non obstructive CAD, HFpEF who presents today for CHF f/u. At her last OV:   She is now getting lasix infusion weekly:  Weekly Lasix 120mg IVP followed by 20mg hour x 4 hours. Take Metolazone 5mg once a week 30 minutes before Each Lasix Infusion. Take Torsemide 100mg Twice a day  until your weight is at 205 pounds.  Then decrease dose to 100mg in the morning and 50mg in the afternoon.   Today: She is feeling better since last week's infusion - wt was 209 on our scale prior to infusion last week and is 200 today on both our scale and her's. Her SOB and edema and better today and she denies chest pain, orthopnea, PND, exertional chest pressure/discomfort, early saiety, syncope.      Today's Wt at home: 200     Baseline Weight: 200  Wt Readings from Last 3 Encounters:   06/26/24 95.7 kg (211 lb)   06/20/24 93.7 kg (206 lb 8 oz)   06/12/24 100.2 kg (221 lb)        EF: 55-60%  Cardiac Imaging:Echo 3/6/24:   Summary   Definity was used to better delineate endocardial borders.   Normal left ventricle size, wall thickness, and systolic function with an   estimated ejection fraction of 60%. No regional wall motion abnormalities   are seen.   No regional wall motion abnormalities are noted.   The right ventricle is normal in size and function.   Mitral annular calcification is present.   Unable to estimate pulmonary artery pressure secondary to incomplete TR jet   envelope.  Echo 10/3/22:   Summary   Normal left ventricle size, wall thickness and systolic function with an   estimated ejection fraction of 55-60%.   No regional wall motion abnormalities are seen.   Diastolic filling parameters suggests grade I diastolic dysfunction.   Mild mitral regurgitation.   Unable to estimate pulmonary

## 2024-06-30 PROBLEM — J45.51 SEVERE PERSISTENT ASTHMA WITH ACUTE EXACERBATION: Status: ACTIVE | Noted: 2018-02-02

## 2024-06-30 PROBLEM — R41.3 MEMORY PROBLEM: Status: ACTIVE | Noted: 2024-06-30

## 2024-06-30 PROBLEM — R29.898 WEAKNESS OF RIGHT UPPER EXTREMITY: Status: ACTIVE | Noted: 2024-06-30

## 2024-06-30 PROBLEM — R47.1 DYSARTHRIA: Status: ACTIVE | Noted: 2024-06-30

## 2024-06-30 PROBLEM — M54.42 ACUTE LEFT-SIDED LOW BACK PAIN WITH LEFT-SIDED SCIATICA: Status: ACTIVE | Noted: 2024-06-30

## 2024-06-30 PROBLEM — R29.898 LEFT LEG WEAKNESS: Status: ACTIVE | Noted: 2024-06-30

## 2024-07-01 ENCOUNTER — OFFICE VISIT (OUTPATIENT)
Dept: CARDIOLOGY CLINIC | Age: 54
End: 2024-07-01
Payer: COMMERCIAL

## 2024-07-01 VITALS
WEIGHT: 200.8 LBS | SYSTOLIC BLOOD PRESSURE: 116 MMHG | HEIGHT: 64 IN | HEART RATE: 69 BPM | DIASTOLIC BLOOD PRESSURE: 66 MMHG | BODY MASS INDEX: 34.28 KG/M2 | OXYGEN SATURATION: 97 %

## 2024-07-01 DIAGNOSIS — R06.02 SHORTNESS OF BREATH: ICD-10-CM

## 2024-07-01 DIAGNOSIS — R60.0 LOCALIZED EDEMA: ICD-10-CM

## 2024-07-01 DIAGNOSIS — I10 ESSENTIAL HYPERTENSION: Chronic | ICD-10-CM

## 2024-07-01 DIAGNOSIS — I50.32 CHRONIC DIASTOLIC HEART FAILURE (HCC): Primary | ICD-10-CM

## 2024-07-01 PROCEDURE — 3074F SYST BP LT 130 MM HG: CPT | Performed by: NURSE PRACTITIONER

## 2024-07-01 PROCEDURE — 3017F COLORECTAL CA SCREEN DOC REV: CPT | Performed by: NURSE PRACTITIONER

## 2024-07-01 PROCEDURE — G8427 DOCREV CUR MEDS BY ELIG CLIN: HCPCS | Performed by: NURSE PRACTITIONER

## 2024-07-01 PROCEDURE — G8417 CALC BMI ABV UP PARAM F/U: HCPCS | Performed by: NURSE PRACTITIONER

## 2024-07-01 PROCEDURE — 3078F DIAST BP <80 MM HG: CPT | Performed by: NURSE PRACTITIONER

## 2024-07-01 PROCEDURE — 1036F TOBACCO NON-USER: CPT | Performed by: NURSE PRACTITIONER

## 2024-07-01 PROCEDURE — 99214 OFFICE O/P EST MOD 30 MIN: CPT | Performed by: NURSE PRACTITIONER

## 2024-07-01 NOTE — PATIENT INSTRUCTIONS
Instructions:   Medications:continue current meds with higher dose torsemide if wt 205 or higher, weekly infusions  Labs with infusions  Follow up: Nhung in 4-6 weeks      [place] CHF Resource Line: [phone]

## 2024-07-03 ENCOUNTER — TELEPHONE (OUTPATIENT)
Dept: ENDOCRINOLOGY | Age: 54
End: 2024-07-03

## 2024-07-03 ENCOUNTER — APPOINTMENT (OUTPATIENT)
Dept: ONCOLOGY | Age: 54
End: 2024-07-03
Payer: COMMERCIAL

## 2024-07-03 ENCOUNTER — HOSPITAL ENCOUNTER (OUTPATIENT)
Dept: ONCOLOGY | Age: 54
Setting detail: INFUSION SERIES
Discharge: HOME | End: 2024-07-03
Payer: COMMERCIAL

## 2024-07-03 VITALS
BODY MASS INDEX: 33.99 KG/M2 | DIASTOLIC BLOOD PRESSURE: 70 MMHG | RESPIRATION RATE: 16 BRPM | TEMPERATURE: 97.4 F | HEART RATE: 64 BPM | SYSTOLIC BLOOD PRESSURE: 125 MMHG | OXYGEN SATURATION: 98 % | WEIGHT: 198 LBS

## 2024-07-03 DIAGNOSIS — R06.02 SHORTNESS OF BREATH: ICD-10-CM

## 2024-07-03 DIAGNOSIS — I50.9 ACUTE ON CHRONIC CONGESTIVE HEART FAILURE, UNSPECIFIED HEART FAILURE TYPE (HCC): Primary | ICD-10-CM

## 2024-07-03 DIAGNOSIS — E11.9 TYPE 2 DIABETES MELLITUS WITHOUT COMPLICATION, WITH LONG-TERM CURRENT USE OF INSULIN (HCC): Primary | ICD-10-CM

## 2024-07-03 DIAGNOSIS — Z79.4 TYPE 2 DIABETES MELLITUS WITHOUT COMPLICATION, WITH LONG-TERM CURRENT USE OF INSULIN (HCC): Primary | ICD-10-CM

## 2024-07-03 DIAGNOSIS — I50.32 CHRONIC HEART FAILURE WITH PRESERVED EJECTION FRACTION (HCC): ICD-10-CM

## 2024-07-03 LAB
ANION GAP SERPL CALCULATED.3IONS-SCNC: 17 MMOL/L (ref 3–16)
BUN SERPL-MCNC: 63 MG/DL (ref 7–20)
CALCIUM SERPL-MCNC: 9.4 MG/DL (ref 8.3–10.6)
CHLORIDE SERPL-SCNC: 88 MMOL/L (ref 99–110)
CO2 SERPL-SCNC: 30 MMOL/L (ref 21–32)
CREAT SERPL-MCNC: 1.8 MG/DL (ref 0.6–1.1)
GFR SERPLBLD CREATININE-BSD FMLA CKD-EPI: 33 ML/MIN/{1.73_M2}
GLUCOSE SERPL-MCNC: 534 MG/DL (ref 70–99)
NT-PROBNP SERPL-MCNC: 378 PG/ML (ref 0–124)
POTASSIUM SERPL-SCNC: 3.3 MMOL/L (ref 3.5–5.1)
SODIUM SERPL-SCNC: 135 MMOL/L (ref 136–145)

## 2024-07-03 PROCEDURE — 96366 THER/PROPH/DIAG IV INF ADDON: CPT

## 2024-07-03 PROCEDURE — 80048 BASIC METABOLIC PNL TOTAL CA: CPT

## 2024-07-03 PROCEDURE — 83880 ASSAY OF NATRIURETIC PEPTIDE: CPT

## 2024-07-03 PROCEDURE — 2580000003 HC RX 258: Performed by: INTERNAL MEDICINE

## 2024-07-03 PROCEDURE — 99211 OFF/OP EST MAY X REQ PHY/QHP: CPT

## 2024-07-03 PROCEDURE — 96365 THER/PROPH/DIAG IV INF INIT: CPT

## 2024-07-03 PROCEDURE — 96375 TX/PRO/DX INJ NEW DRUG ADDON: CPT

## 2024-07-03 PROCEDURE — 6360000002 HC RX W HCPCS: Performed by: INTERNAL MEDICINE

## 2024-07-03 RX ORDER — FUROSEMIDE 10 MG/ML
120 INJECTION INTRAMUSCULAR; INTRAVENOUS ONCE
Status: CANCELLED
Start: 2024-07-04 | End: 2024-07-04

## 2024-07-03 RX ORDER — SODIUM CHLORIDE 0.9 % (FLUSH) 0.9 %
5-40 SYRINGE (ML) INJECTION ONCE
Status: COMPLETED | OUTPATIENT
Start: 2024-07-03 | End: 2024-07-03

## 2024-07-03 RX ORDER — SODIUM CHLORIDE 0.9 % (FLUSH) 0.9 %
5-40 SYRINGE (ML) INJECTION ONCE
Status: CANCELLED
Start: 2024-07-04 | End: 2024-07-04

## 2024-07-03 RX ORDER — FUROSEMIDE 10 MG/ML
120 INJECTION INTRAMUSCULAR; INTRAVENOUS ONCE
Status: COMPLETED | OUTPATIENT
Start: 2024-07-03 | End: 2024-07-03

## 2024-07-03 RX ADMIN — FUROSEMIDE 120 MG: 10 INJECTION, SOLUTION INTRAMUSCULAR; INTRAVENOUS at 10:53

## 2024-07-03 RX ADMIN — FUROSEMIDE 20 MG/HR: 10 INJECTION, SOLUTION INTRAMUSCULAR; INTRAVENOUS at 10:58

## 2024-07-03 RX ADMIN — Medication 10 ML: at 10:56

## 2024-07-03 NOTE — DISCHARGE INSTRUCTIONS
Dr. Parsons returned call and verbalized to stop Lasix gtt at this time. Prescription for potassium to be called into the patient's pharmacy with directions to take today and tomorrow as directed. Office to follow-up with pt on Monday for lab draw prior to next scheduled Flynn

## 2024-07-03 NOTE — TELEPHONE ENCOUNTER
Patient  reports that since switching from Tresiba to Levemir her BS have been very high. Currently up to 200 units at night and blood sugars are still in the 400s or above and does not come down until she takes the Novolog with her meals.     Please advise.

## 2024-07-03 NOTE — PROGRESS NOTES
Pt to Dept for labs, Lasix IVP and Lasix gtt as ordered from Dr. Parsons. Pt reporting blood sugar running high this Am in the 500's, reporting taking Insulin as ordered. IV started with labs drawn. Lasix 120mg given IVP over 6 minutes followed by Lasix gtt started at 20mg/hr for 4 hours. Pt was instructed to take Metolazone 30 minutes prior to Lasix, verified dose was taken with Patient. Call placed to Dr. Parsons's office in regards to lab results from today. Dr. Parsons returned call and verbalized to stop Lasix gtt at this time. Lasix gtt stopped at 1408. Prescription for potassium to be called into the Patient's pharmacy with directions to take today and tomorrow as directed. Office to follow-up with pt on Monday for lab draw prior to next scheduled Appt. Pt and Pt's  verbalized understanding. Pt reported blood sugar 1430 was 200. Verbalized to Pt and Pt's  that follow up Appt is needed with Dr. Taylor due to Blood sugar with extreme highs and lows. Pt scheduled to return next Thursday for same treatment, to follow up with office next week prior to Appt. Pt discharged home at this time per W/C with Home O2 with .

## 2024-07-08 ENCOUNTER — CLINICAL DOCUMENTATION (OUTPATIENT)
Dept: ONCOLOGY | Age: 54
End: 2024-07-08

## 2024-07-08 RX ORDER — INSULIN GLARGINE 300 U/ML
INJECTION, SOLUTION SUBCUTANEOUS
Qty: 24 ML | Refills: 3 | Status: SHIPPED | OUTPATIENT
Start: 2024-07-08

## 2024-07-08 NOTE — PROGRESS NOTES
Pt to have bloodwork drawn 7/10 prior to Appt 7/11. Starting with 7/18 Appt, Labs to be results and reviewed prior to start of lasix treatment. Spoke with Pt's ,verbalized understanding of change in plan of care

## 2024-07-08 NOTE — TELEPHONE ENCOUNTER
JASON.....Called patients  and advised him to increase her short acting insulin to 35 units with each meal and went ahead and switched patient to Toujeo.

## 2024-07-09 DIAGNOSIS — I50.32 CHRONIC DIASTOLIC HEART FAILURE (HCC): ICD-10-CM

## 2024-07-09 LAB
ANION GAP SERPL CALCULATED.3IONS-SCNC: 12 MMOL/L (ref 3–16)
BUN SERPL-MCNC: 51 MG/DL (ref 7–20)
CALCIUM SERPL-MCNC: 9.7 MG/DL (ref 8.3–10.6)
CHLORIDE SERPL-SCNC: 91 MMOL/L (ref 99–110)
CO2 SERPL-SCNC: 37 MMOL/L (ref 21–32)
CREAT SERPL-MCNC: 1.3 MG/DL (ref 0.6–1.1)
GFR SERPLBLD CREATININE-BSD FMLA CKD-EPI: 48 ML/MIN/{1.73_M2}
GLUCOSE SERPL-MCNC: 209 MG/DL (ref 70–99)
NT-PROBNP SERPL-MCNC: 676 PG/ML (ref 0–124)
POTASSIUM SERPL-SCNC: 3.5 MMOL/L (ref 3.5–5.1)
SODIUM SERPL-SCNC: 140 MMOL/L (ref 136–145)

## 2024-07-11 ENCOUNTER — HOSPITAL ENCOUNTER (OUTPATIENT)
Dept: ONCOLOGY | Age: 54
Setting detail: INFUSION SERIES
Discharge: HOME | End: 2024-07-11
Payer: COMMERCIAL

## 2024-07-11 VITALS
DIASTOLIC BLOOD PRESSURE: 59 MMHG | BODY MASS INDEX: 33.3 KG/M2 | SYSTOLIC BLOOD PRESSURE: 114 MMHG | RESPIRATION RATE: 16 BRPM | WEIGHT: 194 LBS | OXYGEN SATURATION: 98 % | HEART RATE: 72 BPM | TEMPERATURE: 97.1 F

## 2024-07-11 DIAGNOSIS — I50.32 CHRONIC HEART FAILURE WITH PRESERVED EJECTION FRACTION (HCC): ICD-10-CM

## 2024-07-11 DIAGNOSIS — R06.02 SHORTNESS OF BREATH: ICD-10-CM

## 2024-07-11 DIAGNOSIS — I50.9 ACUTE ON CHRONIC CONGESTIVE HEART FAILURE, UNSPECIFIED HEART FAILURE TYPE (HCC): Primary | ICD-10-CM

## 2024-07-11 PROCEDURE — 96365 THER/PROPH/DIAG IV INF INIT: CPT

## 2024-07-11 PROCEDURE — 99211 OFF/OP EST MAY X REQ PHY/QHP: CPT

## 2024-07-11 PROCEDURE — 2580000003 HC RX 258: Performed by: INTERNAL MEDICINE

## 2024-07-11 PROCEDURE — 96366 THER/PROPH/DIAG IV INF ADDON: CPT

## 2024-07-11 PROCEDURE — 6360000002 HC RX W HCPCS: Performed by: INTERNAL MEDICINE

## 2024-07-11 PROCEDURE — 96375 TX/PRO/DX INJ NEW DRUG ADDON: CPT

## 2024-07-11 RX ORDER — SODIUM CHLORIDE 0.9 % (FLUSH) 0.9 %
5-40 SYRINGE (ML) INJECTION ONCE
Status: COMPLETED | OUTPATIENT
Start: 2024-07-11 | End: 2024-07-11

## 2024-07-11 RX ORDER — SODIUM CHLORIDE 0.9 % (FLUSH) 0.9 %
5-40 SYRINGE (ML) INJECTION ONCE
Start: 2024-07-18 | End: 2024-07-18

## 2024-07-11 RX ORDER — FUROSEMIDE 10 MG/ML
120 INJECTION INTRAMUSCULAR; INTRAVENOUS ONCE
Status: COMPLETED | OUTPATIENT
Start: 2024-07-11 | End: 2024-07-11

## 2024-07-11 RX ORDER — FUROSEMIDE 10 MG/ML
120 INJECTION INTRAMUSCULAR; INTRAVENOUS ONCE
Start: 2024-07-18 | End: 2024-07-18

## 2024-07-11 RX ADMIN — FUROSEMIDE 120 MG: 10 INJECTION, SOLUTION INTRAMUSCULAR; INTRAVENOUS at 11:00

## 2024-07-11 RX ADMIN — Medication 10 ML: at 11:00

## 2024-07-11 RX ADMIN — FUROSEMIDE 20 MG/HR: 10 INJECTION, SOLUTION INTRAMUSCULAR; INTRAVENOUS at 11:06

## 2024-07-11 NOTE — PROGRESS NOTES
Pt to Dept for  Lasix IVP and Lasix gtt as ordered from Dr. Parsons. Labs drawn 7/9 and reviewed per Heart Failure.Pt was instructed to take Metolazone 30 minutes prior to Lasix, verified dose was taken with Patient.  Lasix 120mg given IVP over 6 minutes followed by Lasix gtt started at 20mg/hr for 4 hours. Pt scheduled to return next Thursday for same treatment. Pt discharged home at this time per W/C with Home O2 with .

## 2024-07-12 ENCOUNTER — HOSPITAL ENCOUNTER (OUTPATIENT)
Dept: MRI IMAGING | Age: 54
Discharge: HOME | End: 2024-07-12
Attending: INTERNAL MEDICINE
Payer: COMMERCIAL

## 2024-07-12 DIAGNOSIS — Z85.3 HX OF BREAST CANCER: ICD-10-CM

## 2024-07-12 DIAGNOSIS — R41.3 MEMORY PROBLEM: ICD-10-CM

## 2024-07-12 DIAGNOSIS — R47.1 DYSARTHRIA: ICD-10-CM

## 2024-07-12 DIAGNOSIS — R29.898 WEAKNESS OF RIGHT UPPER EXTREMITY: ICD-10-CM

## 2024-07-12 DIAGNOSIS — M54.42 ACUTE LEFT-SIDED LOW BACK PAIN WITH LEFT-SIDED SCIATICA: ICD-10-CM

## 2024-07-12 DIAGNOSIS — R29.898 LEFT LEG WEAKNESS: ICD-10-CM

## 2024-07-12 PROCEDURE — 72148 MRI LUMBAR SPINE W/O DYE: CPT

## 2024-07-12 PROCEDURE — 70551 MRI BRAIN STEM W/O DYE: CPT

## 2024-07-17 ENCOUNTER — CARE COORDINATION (OUTPATIENT)
Dept: CARE COORDINATION | Age: 54
End: 2024-07-17

## 2024-07-17 NOTE — CARE COORDINATION
Ambulatory Care Coordination Note     2024 1:39 PM     Patient Current Location:  [place]     This patient was received as a referral from Ambulatory Care Manager .    ACM contacted the spouse/partner  by telephone. Verified name and  with spouse/partner as identifiers. Provided introduction to self, and explanation of the ACM role. Spouse/Partner declined care management services at this time.          ACM: Deysi Leyva RN     Challenges to be reviewed by the provider   Additional needs identified to be addressed with provider Yes  none               Method of communication with provider: chart routing.    Care Summary Note:     PCP referral to care coordination and RPM. ACM outreached patient; spouse answered the phone and requested to speak for patient. ACM confirmed he is listed on PHI release form. Josias declined care coordination and RPM at this time. Spouse states she follows with cardiology closely and has a nurse friend who visits their home frequently to assist in her healthcare. Spouse states they have received cardiac resources through cardiology and declined a need for additional support or resources at this time. Spouse states he monitors Salwa's weight and BP daily and understands s/sx to report to cardiology. Spouse also declined resources related to diabetes. ACM encouraged spouse to call ACM at [phone] if future care coordination needs change, spouse vu.   Offered patient enrollment in the Remote Patient Monitoring (RPM) program for in-home monitoring: Yes, but did not enroll at this time: already monitoring with home equipment.       PCP/Specialist follow up:   Future Appointments         Provider Specialty Dept Phone    2024 9:15 AM ROOM  - [facility] [phone]    2024 9:15 AM [address] - [facility] [phone]    2024 8:20 AM Mariana Simental DO Internal Medicine [phone]    2024 9:15 AM ROOM 3A - [facility]

## 2024-07-18 ENCOUNTER — TELEPHONE (OUTPATIENT)
Dept: INTERNAL MEDICINE CLINIC | Age: 54
End: 2024-07-18

## 2024-07-18 ENCOUNTER — HOSPITAL ENCOUNTER (OUTPATIENT)
Dept: ONCOLOGY | Age: 54
Setting detail: INFUSION SERIES
Discharge: HOME | End: 2024-07-18
Payer: COMMERCIAL

## 2024-07-18 VITALS
DIASTOLIC BLOOD PRESSURE: 67 MMHG | SYSTOLIC BLOOD PRESSURE: 116 MMHG | RESPIRATION RATE: 16 BRPM | OXYGEN SATURATION: 96 % | WEIGHT: 214.2 LBS | TEMPERATURE: 97.5 F | HEART RATE: 66 BPM | BODY MASS INDEX: 36.77 KG/M2

## 2024-07-18 DIAGNOSIS — R06.02 SHORTNESS OF BREATH: ICD-10-CM

## 2024-07-18 DIAGNOSIS — I50.32 CHRONIC HEART FAILURE WITH PRESERVED EJECTION FRACTION (HCC): ICD-10-CM

## 2024-07-18 DIAGNOSIS — I50.9 ACUTE ON CHRONIC CONGESTIVE HEART FAILURE, UNSPECIFIED HEART FAILURE TYPE (HCC): Primary | ICD-10-CM

## 2024-07-18 LAB
ANION GAP SERPL CALCULATED.3IONS-SCNC: 12 MMOL/L (ref 3–16)
BUN SERPL-MCNC: 41 MG/DL (ref 7–20)
CALCIUM SERPL-MCNC: 9 MG/DL (ref 8.3–10.6)
CHLORIDE SERPL-SCNC: 99 MMOL/L (ref 99–110)
CO2 SERPL-SCNC: 31 MMOL/L (ref 21–32)
CREAT SERPL-MCNC: 1.4 MG/DL (ref 0.6–1.1)
GFR SERPLBLD CREATININE-BSD FMLA CKD-EPI: 44 ML/MIN/{1.73_M2}
GLUCOSE SERPL-MCNC: 151 MG/DL (ref 70–99)
NT-PROBNP SERPL-MCNC: 413 PG/ML (ref 0–124)
POTASSIUM SERPL-SCNC: 3.9 MMOL/L (ref 3.5–5.1)
SODIUM SERPL-SCNC: 142 MMOL/L (ref 136–145)

## 2024-07-18 PROCEDURE — 99211 OFF/OP EST MAY X REQ PHY/QHP: CPT

## 2024-07-18 PROCEDURE — 2580000003 HC RX 258: Performed by: INTERNAL MEDICINE

## 2024-07-18 PROCEDURE — 96375 TX/PRO/DX INJ NEW DRUG ADDON: CPT

## 2024-07-18 PROCEDURE — 6360000002 HC RX W HCPCS: Performed by: INTERNAL MEDICINE

## 2024-07-18 PROCEDURE — 83880 ASSAY OF NATRIURETIC PEPTIDE: CPT

## 2024-07-18 PROCEDURE — 96365 THER/PROPH/DIAG IV INF INIT: CPT

## 2024-07-18 PROCEDURE — 96366 THER/PROPH/DIAG IV INF ADDON: CPT

## 2024-07-18 PROCEDURE — 80048 BASIC METABOLIC PNL TOTAL CA: CPT

## 2024-07-18 RX ORDER — SODIUM CHLORIDE 0.9 % (FLUSH) 0.9 %
5-40 SYRINGE (ML) INJECTION ONCE
Status: COMPLETED | OUTPATIENT
Start: 2024-07-18 | End: 2024-07-18

## 2024-07-18 RX ORDER — FUROSEMIDE 10 MG/ML
120 INJECTION INTRAMUSCULAR; INTRAVENOUS ONCE
Status: COMPLETED | OUTPATIENT
Start: 2024-07-18 | End: 2024-07-18

## 2024-07-18 RX ORDER — FUROSEMIDE 10 MG/ML
120 INJECTION INTRAMUSCULAR; INTRAVENOUS ONCE
Status: CANCELLED
Start: 2024-07-25 | End: 2024-07-25

## 2024-07-18 RX ORDER — SODIUM CHLORIDE 0.9 % (FLUSH) 0.9 %
5-40 SYRINGE (ML) INJECTION ONCE
Status: CANCELLED
Start: 2024-07-25 | End: 2024-07-25

## 2024-07-18 RX ADMIN — Medication 10 ML: at 10:26

## 2024-07-18 RX ADMIN — FUROSEMIDE 120 MG: 10 INJECTION, SOLUTION INTRAMUSCULAR; INTRAVENOUS at 10:26

## 2024-07-18 RX ADMIN — FUROSEMIDE 20 MG/HR: 10 INJECTION, SOLUTION INTRAMUSCULAR; INTRAVENOUS at 10:28

## 2024-07-18 NOTE — PROGRESS NOTES
Pt to Dept for Lasix IVP and Lasix gtt as ordered from Dr. Parsons. Weight obtained and Labs drawn this AM. Results reviewed per Nhung Fernandez CNP ,Ok to proceed with Tx as ordered today. Pt took Metolazone dose at 9 AM today.  Lasix 120mg given IVP over 6 minutes followed by Lasix gtt started at 20mg/hr for 4 hours. Pt scheduled to return next Thursday for same treatment. Pt discharged home at this time per W/C with Home O2 with .

## 2024-07-25 ENCOUNTER — HOSPITAL ENCOUNTER (OUTPATIENT)
Dept: ONCOLOGY | Age: 54
Setting detail: INFUSION SERIES
Discharge: HOME | End: 2024-07-25
Payer: COMMERCIAL

## 2024-07-25 VITALS
WEIGHT: 207 LBS | SYSTOLIC BLOOD PRESSURE: 132 MMHG | HEIGHT: 64 IN | DIASTOLIC BLOOD PRESSURE: 71 MMHG | RESPIRATION RATE: 16 BRPM | TEMPERATURE: 97.4 F | BODY MASS INDEX: 35.34 KG/M2 | OXYGEN SATURATION: 99 % | HEART RATE: 66 BPM

## 2024-07-25 DIAGNOSIS — I50.32 CHRONIC HEART FAILURE WITH PRESERVED EJECTION FRACTION (HCC): ICD-10-CM

## 2024-07-25 DIAGNOSIS — R06.02 SHORTNESS OF BREATH: ICD-10-CM

## 2024-07-25 DIAGNOSIS — I50.9 ACUTE ON CHRONIC CONGESTIVE HEART FAILURE, UNSPECIFIED HEART FAILURE TYPE (HCC): Primary | ICD-10-CM

## 2024-07-25 LAB
ANION GAP SERPL CALCULATED.3IONS-SCNC: 13 MMOL/L (ref 3–16)
BUN SERPL-MCNC: 29 MG/DL (ref 7–20)
CALCIUM SERPL-MCNC: 8.9 MG/DL (ref 8.3–10.6)
CHLORIDE SERPL-SCNC: 92 MMOL/L (ref 99–110)
CO2 SERPL-SCNC: 36 MMOL/L (ref 21–32)
CREAT SERPL-MCNC: 1.2 MG/DL (ref 0.6–1.1)
GFR SERPLBLD CREATININE-BSD FMLA CKD-EPI: 53 ML/MIN/{1.73_M2}
GLUCOSE SERPL-MCNC: 365 MG/DL (ref 70–99)
NT-PROBNP SERPL-MCNC: 545 PG/ML (ref 0–124)
POTASSIUM SERPL-SCNC: 4 MMOL/L (ref 3.5–5.1)
SODIUM SERPL-SCNC: 141 MMOL/L (ref 136–145)

## 2024-07-25 PROCEDURE — 83880 ASSAY OF NATRIURETIC PEPTIDE: CPT

## 2024-07-25 PROCEDURE — 80048 BASIC METABOLIC PNL TOTAL CA: CPT

## 2024-07-25 PROCEDURE — 96365 THER/PROPH/DIAG IV INF INIT: CPT

## 2024-07-25 PROCEDURE — 96366 THER/PROPH/DIAG IV INF ADDON: CPT

## 2024-07-25 PROCEDURE — 6360000002 HC RX W HCPCS: Performed by: INTERNAL MEDICINE

## 2024-07-25 PROCEDURE — 2580000003 HC RX 258: Performed by: INTERNAL MEDICINE

## 2024-07-25 PROCEDURE — 96375 TX/PRO/DX INJ NEW DRUG ADDON: CPT

## 2024-07-25 PROCEDURE — 99211 OFF/OP EST MAY X REQ PHY/QHP: CPT

## 2024-07-25 RX ORDER — FUROSEMIDE 10 MG/ML
120 INJECTION INTRAMUSCULAR; INTRAVENOUS ONCE
Status: CANCELLED
Start: 2024-08-01 | End: 2024-08-01

## 2024-07-25 RX ORDER — FUROSEMIDE 10 MG/ML
120 INJECTION INTRAMUSCULAR; INTRAVENOUS ONCE
Status: COMPLETED | OUTPATIENT
Start: 2024-07-25 | End: 2024-07-25

## 2024-07-25 RX ORDER — SODIUM CHLORIDE 0.9 % (FLUSH) 0.9 %
5-40 SYRINGE (ML) INJECTION ONCE
Status: COMPLETED | OUTPATIENT
Start: 2024-07-25 | End: 2024-07-25

## 2024-07-25 RX ORDER — SODIUM CHLORIDE 0.9 % (FLUSH) 0.9 %
5-40 SYRINGE (ML) INJECTION ONCE
Status: CANCELLED
Start: 2024-08-01 | End: 2024-08-01

## 2024-07-25 RX ADMIN — Medication 10 ML: at 10:46

## 2024-07-25 RX ADMIN — FUROSEMIDE 120 MG: 10 INJECTION, SOLUTION INTRAMUSCULAR; INTRAVENOUS at 10:45

## 2024-07-25 RX ADMIN — FUROSEMIDE 20 MG/HR: 10 INJECTION, SOLUTION INTRAMUSCULAR; INTRAVENOUS at 11:08

## 2024-07-25 NOTE — PROGRESS NOTES
BMP and BNP results given to Nhung Fernandez NP. Orders noted to proceed with orders of 120 mg Lasix IVP followed with a Lasix infusion 20 mg/ hr for 4 hours. Pt is receptive to plan of care.

## 2024-07-25 NOTE — PROGRESS NOTES
Patient arrived to infusion center per wheel chair. Pt is SOB with portable O2 at 2L per NC. Pt is tearful. Approached pt calmly and unhurriedly, emotional support given. Pt  at bedside. SOB subsided with in 5-7 minutes.

## 2024-07-25 NOTE — PROGRESS NOTES
Lasix infusion 20 mg/hr over 4 hours completed. Pt tolerated well with numerous trips to the bathroom to void. Gait steady. Pt expressed that her breathing is better. Patient is notably less SOB. SaO2 99% with O2 at 2l/NC. Pt post infusion weight is 207 lb.per standing scale. Pt discharged per wheel chair to car accompanied by .

## 2024-07-29 ENCOUNTER — OFFICE VISIT (OUTPATIENT)
Dept: INTERNAL MEDICINE CLINIC | Age: 54
End: 2024-07-29

## 2024-07-29 VITALS
WEIGHT: 206 LBS | HEART RATE: 76 BPM | SYSTOLIC BLOOD PRESSURE: 118 MMHG | OXYGEN SATURATION: 96 % | DIASTOLIC BLOOD PRESSURE: 72 MMHG | BODY MASS INDEX: 35.36 KG/M2

## 2024-07-29 DIAGNOSIS — E11.65 TYPE 2 DIABETES MELLITUS WITH HYPERGLYCEMIA, WITH LONG-TERM CURRENT USE OF INSULIN (HCC): ICD-10-CM

## 2024-07-29 DIAGNOSIS — I50.32 CHRONIC HEART FAILURE WITH PRESERVED EJECTION FRACTION (HCC): Chronic | ICD-10-CM

## 2024-07-29 DIAGNOSIS — M50.30 DDD (DEGENERATIVE DISC DISEASE), CERVICAL: ICD-10-CM

## 2024-07-29 DIAGNOSIS — Z79.4 TYPE 2 DIABETES MELLITUS WITH HYPERGLYCEMIA, WITH LONG-TERM CURRENT USE OF INSULIN (HCC): ICD-10-CM

## 2024-07-29 DIAGNOSIS — I25.10 CORONARY ARTERY DISEASE INVOLVING NATIVE CORONARY ARTERY OF NATIVE HEART WITHOUT ANGINA PECTORIS: ICD-10-CM

## 2024-07-29 DIAGNOSIS — Z85.3 HISTORY OF BREAST CANCER: Primary | ICD-10-CM

## 2024-07-29 DIAGNOSIS — R29.898 LEFT LEG WEAKNESS: ICD-10-CM

## 2024-07-29 DIAGNOSIS — R47.1 DYSARTHRIA: ICD-10-CM

## 2024-07-29 DIAGNOSIS — R29.898 WEAKNESS OF RIGHT UPPER EXTREMITY: ICD-10-CM

## 2024-07-29 DIAGNOSIS — M51.36 DDD (DEGENERATIVE DISC DISEASE), LUMBAR: ICD-10-CM

## 2024-07-29 DIAGNOSIS — R51.9 CHRONIC DAILY HEADACHE: ICD-10-CM

## 2024-07-29 NOTE — PROGRESS NOTES
Patient: Crow Bonner is a 54 y.o. female who presents today with the following Chief Complaint(s):  Chief Complaint   Patient presents with    Check-Up     4 wk marily        HPI    Here today for follow up. Accompanied by her  who interprets for her. Primary language is Portuguese.     Still with weakness in right hand. Will drop things. Does have neck pain (anterior and posterior).   - posterior neck pain sharp and will radiate into her left and right arms. Has numbness in b/l hands.   - does have occipital headaches that radiate forward.     Heart failure is \"getting worse\" per . Does better following Lasix infusions but then gains weight rapidly over the week.     Does still have difficulty speaking at times.     Dry mouth all of the time.     Results for orders placed or performed during the hospital encounter of 07/25/24   Basic Metabolic Panel   Result Value Ref Range    Sodium 141 136 - 145 mmol/L    Potassium 4.0 3.5 - 5.1 mmol/L    Chloride 92 (L) 99 - 110 mmol/L    CO2 36 (H) 21 - 32 mmol/L    Anion Gap 13 3 - 16    Glucose 365 (H) 70 - 99 mg/dL    BUN 29 (H) 7 - 20 mg/dL    Creatinine 1.2 (H) 0.6 - 1.1 mg/dL    Est, Glom Filt Rate 53 (A) >60    Calcium 8.9 8.3 - 10.6 mg/dL   Brain Natriuretic Peptide   Result Value Ref Range    Pro- (H) 0 - 124 pg/mL     *Note: Due to a large number of results and/or encounters for the requested time period, some results have not been displayed. A complete set of results can be found in Results Review.          Latest Ref Rng & Units 7/31/2024     9:34 AM 7/29/2024     9:31 AM 7/25/2024     9:44 AM 7/18/2024     9:33 AM 7/9/2024     2:53 PM   Labs Renal   BUN 7 - 20 mg/dL 32  30  29  41  51    Cr 0.6 - 1.1 mg/dL 1.6  1.4  1.2  1.4  1.3    K 3.5 - 5.1 mmol/L 4.1  4.2  4.0  3.9  3.5    Na 136 - 145 mmol/L 141  139  141  142  140            MRI Result (most recent):  MRI LUMBAR SPINE WO CONTRAST 07/12/2024    Narrative  EXAMINATION:  MRI OF THE LUMBAR

## 2024-07-29 NOTE — PATIENT INSTRUCTIONS
Please call the radiology department at [facility] and ask for a copy of the MRI of your low back on disc to take with you to your pain appointment.   You will want a copy of the MRI of your neck (after you have it completed) to take with you as well.

## 2024-07-30 LAB
ALBUMIN SERPL-MCNC: 4 G/DL (ref 3.4–5)
ALBUMIN/GLOB SERPL: 1.4 {RATIO} (ref 1.1–2.2)
ALP SERPL-CCNC: 114 U/L (ref 40–129)
ALT SERPL-CCNC: 13 U/L (ref 10–40)
ANION GAP SERPL CALCULATED.3IONS-SCNC: 16 MMOL/L (ref 3–16)
AST SERPL-CCNC: 22 U/L (ref 15–37)
BILIRUB SERPL-MCNC: <0.2 MG/DL (ref 0–1)
BUN SERPL-MCNC: 30 MG/DL (ref 7–20)
CALCIUM SERPL-MCNC: 8.7 MG/DL (ref 8.3–10.6)
CHLORIDE SERPL-SCNC: 92 MMOL/L (ref 99–110)
CHOLEST SERPL-MCNC: 180 MG/DL (ref 0–199)
CO2 SERPL-SCNC: 31 MMOL/L (ref 21–32)
CREAT SERPL-MCNC: 1.4 MG/DL (ref 0.6–1.1)
EST. AVERAGE GLUCOSE BLD GHB EST-MCNC: 197.3 MG/DL
GFR SERPLBLD CREATININE-BSD FMLA CKD-EPI: 44 ML/MIN/{1.73_M2}
GLUCOSE SERPL-MCNC: 253 MG/DL (ref 70–99)
HBA1C MFR BLD: 8.5 %
HDLC SERPL-MCNC: 35 MG/DL (ref 40–60)
LDLC SERPL CALC-MCNC: ABNORMAL MG/DL
LDLC SERPL-MCNC: 77 MG/DL
POTASSIUM SERPL-SCNC: 4.2 MMOL/L (ref 3.5–5.1)
PROT SERPL-MCNC: 6.9 G/DL (ref 6.4–8.2)
SODIUM SERPL-SCNC: 139 MMOL/L (ref 136–145)
TRIGL SERPL-MCNC: 372 MG/DL (ref 0–150)
TSH SERPL DL<=0.005 MIU/L-ACNC: 6.55 UIU/ML (ref 0.27–4.2)
VLDLC SERPL CALC-MCNC: ABNORMAL MG/DL

## 2024-07-31 ENCOUNTER — HOSPITAL ENCOUNTER (OUTPATIENT)
Dept: ONCOLOGY | Age: 54
Setting detail: INFUSION SERIES
Discharge: HOME | End: 2024-07-31
Payer: COMMERCIAL

## 2024-07-31 VITALS
HEART RATE: 65 BPM | OXYGEN SATURATION: 97 % | DIASTOLIC BLOOD PRESSURE: 66 MMHG | BODY MASS INDEX: 35.12 KG/M2 | WEIGHT: 204.6 LBS | RESPIRATION RATE: 16 BRPM | TEMPERATURE: 96.8 F | SYSTOLIC BLOOD PRESSURE: 110 MMHG

## 2024-07-31 DIAGNOSIS — R06.02 SHORTNESS OF BREATH: ICD-10-CM

## 2024-07-31 DIAGNOSIS — I50.9 ACUTE ON CHRONIC CONGESTIVE HEART FAILURE, UNSPECIFIED HEART FAILURE TYPE (HCC): Primary | ICD-10-CM

## 2024-07-31 DIAGNOSIS — I50.32 CHRONIC HEART FAILURE WITH PRESERVED EJECTION FRACTION (HCC): ICD-10-CM

## 2024-07-31 LAB
ANION GAP SERPL CALCULATED.3IONS-SCNC: 11 MMOL/L (ref 3–16)
BUN SERPL-MCNC: 32 MG/DL (ref 7–20)
CALCIUM SERPL-MCNC: 9.2 MG/DL (ref 8.3–10.6)
CHLORIDE SERPL-SCNC: 96 MMOL/L (ref 99–110)
CO2 SERPL-SCNC: 34 MMOL/L (ref 21–32)
CREAT SERPL-MCNC: 1.6 MG/DL (ref 0.6–1.1)
GFR SERPLBLD CREATININE-BSD FMLA CKD-EPI: 38 ML/MIN/{1.73_M2}
GLUCOSE SERPL-MCNC: 218 MG/DL (ref 70–99)
NT-PROBNP SERPL-MCNC: 262 PG/ML (ref 0–124)
POTASSIUM SERPL-SCNC: 4.1 MMOL/L (ref 3.5–5.1)
SODIUM SERPL-SCNC: 141 MMOL/L (ref 136–145)

## 2024-07-31 PROCEDURE — 83880 ASSAY OF NATRIURETIC PEPTIDE: CPT

## 2024-07-31 PROCEDURE — 6360000002 HC RX W HCPCS: Performed by: INTERNAL MEDICINE

## 2024-07-31 PROCEDURE — 96365 THER/PROPH/DIAG IV INF INIT: CPT

## 2024-07-31 PROCEDURE — 99211 OFF/OP EST MAY X REQ PHY/QHP: CPT

## 2024-07-31 PROCEDURE — 96375 TX/PRO/DX INJ NEW DRUG ADDON: CPT

## 2024-07-31 PROCEDURE — 80048 BASIC METABOLIC PNL TOTAL CA: CPT

## 2024-07-31 PROCEDURE — 96366 THER/PROPH/DIAG IV INF ADDON: CPT

## 2024-07-31 PROCEDURE — 2580000003 HC RX 258: Performed by: INTERNAL MEDICINE

## 2024-07-31 RX ORDER — SODIUM CHLORIDE 0.9 % (FLUSH) 0.9 %
5-40 SYRINGE (ML) INJECTION ONCE
Status: COMPLETED | OUTPATIENT
Start: 2024-07-31 | End: 2024-07-31

## 2024-07-31 RX ORDER — FUROSEMIDE 10 MG/ML
120 INJECTION INTRAMUSCULAR; INTRAVENOUS ONCE
Start: 2024-08-01 | End: 2024-08-01

## 2024-07-31 RX ORDER — FUROSEMIDE 10 MG/ML
120 INJECTION INTRAMUSCULAR; INTRAVENOUS ONCE
Status: COMPLETED | OUTPATIENT
Start: 2024-07-31 | End: 2024-07-31

## 2024-07-31 RX ORDER — SODIUM CHLORIDE 0.9 % (FLUSH) 0.9 %
5-40 SYRINGE (ML) INJECTION ONCE
Start: 2024-08-01 | End: 2024-08-01

## 2024-07-31 RX ADMIN — SODIUM CHLORIDE, PRESERVATIVE FREE 10 ML: 5 INJECTION INTRAVENOUS at 10:27

## 2024-07-31 RX ADMIN — FUROSEMIDE 20 MG/HR: 10 INJECTION, SOLUTION INTRAMUSCULAR; INTRAVENOUS at 10:33

## 2024-07-31 RX ADMIN — FUROSEMIDE 120 MG: 10 INJECTION, SOLUTION INTRAMUSCULAR; INTRAVENOUS at 10:23

## 2024-07-31 NOTE — PROGRESS NOTES
Pt to Dept for Lasix IVP and Lasix gtt as ordered from Dr. Parsons. Weight obtained and Labs drawn this AM. Results reviewed per Nhung Fernandez CNP ,Ok to proceed with Tx as ordered today.  Lasix 120mg given IVP over 7 minutes followed by Lasix gtt started at 20mg/hr for 4 hours. Pt scheduled to return next Thursday for same treatment. Pt discharged home at this time per W/C with Home O2 with .

## 2024-08-01 ENCOUNTER — OFFICE VISIT (OUTPATIENT)
Dept: ENDOCRINOLOGY | Age: 54
End: 2024-08-01

## 2024-08-01 VITALS
DIASTOLIC BLOOD PRESSURE: 69 MMHG | BODY MASS INDEX: 34.31 KG/M2 | HEIGHT: 64 IN | SYSTOLIC BLOOD PRESSURE: 129 MMHG | TEMPERATURE: 98 F | WEIGHT: 201 LBS | RESPIRATION RATE: 14 BRPM | HEART RATE: 67 BPM

## 2024-08-01 DIAGNOSIS — E55.9 VITAMIN D DEFICIENCY: ICD-10-CM

## 2024-08-01 DIAGNOSIS — Z79.4 TYPE 2 DIABETES MELLITUS WITH OTHER CIRCULATORY COMPLICATION, WITH LONG-TERM CURRENT USE OF INSULIN (HCC): Primary | ICD-10-CM

## 2024-08-01 DIAGNOSIS — I25.10 CORONARY ARTERY DISEASE DUE TO LIPID RICH PLAQUE: Chronic | ICD-10-CM

## 2024-08-01 DIAGNOSIS — I50.32 CHRONIC DIASTOLIC HEART FAILURE (HCC): ICD-10-CM

## 2024-08-01 DIAGNOSIS — I25.83 CORONARY ARTERY DISEASE DUE TO LIPID RICH PLAQUE: Chronic | ICD-10-CM

## 2024-08-01 DIAGNOSIS — E03.2 HYPOTHYROIDISM DUE TO MEDICATION: ICD-10-CM

## 2024-08-01 DIAGNOSIS — E11.59 TYPE 2 DIABETES MELLITUS WITH OTHER CIRCULATORY COMPLICATION, WITH LONG-TERM CURRENT USE OF INSULIN (HCC): Primary | ICD-10-CM

## 2024-08-01 RX ORDER — FLURBIPROFEN SODIUM 0.3 MG/ML
SOLUTION/ DROPS OPHTHALMIC
Qty: 200 EACH | Refills: 5 | Status: SHIPPED | OUTPATIENT
Start: 2024-08-01

## 2024-08-01 RX ORDER — BLOOD SUGAR DIAGNOSTIC
STRIP MISCELLANEOUS
Qty: 200 STRIP | Refills: 5 | Status: SHIPPED | OUTPATIENT
Start: 2024-08-01

## 2024-08-01 RX ORDER — FOLIC ACID 1 MG/1
TABLET ORAL
Qty: 90 TABLET | Refills: 0 | Status: SHIPPED | OUTPATIENT
Start: 2024-08-01

## 2024-08-01 RX ORDER — DIAZEPAM 5 MG/1
TABLET ORAL
COMMUNITY
Start: 2024-07-30

## 2024-08-01 RX ORDER — LEVOTHYROXINE SODIUM 0.15 MG/1
150 TABLET ORAL
Qty: 90 TABLET | Refills: 1 | Status: SHIPPED | OUTPATIENT
Start: 2024-08-01

## 2024-08-01 RX ORDER — CHOLECALCIFEROL (VITAMIN D3) 25 MCG
3000 TABLET ORAL DAILY
Qty: 270 TABLET | Refills: 1 | Status: SHIPPED | OUTPATIENT
Start: 2024-08-01

## 2024-08-01 RX ORDER — TIRZEPATIDE 2.5 MG/.5ML
2.5 INJECTION, SOLUTION SUBCUTANEOUS WEEKLY
Qty: 4 EACH | Refills: 4 | Status: SHIPPED | OUTPATIENT
Start: 2024-08-01

## 2024-08-01 RX ORDER — INSULIN ASPART 100 [IU]/ML
INJECTION, SOLUTION INTRAVENOUS; SUBCUTANEOUS
Qty: 60 ML | Refills: 3 | Status: SHIPPED | OUTPATIENT
Start: 2024-08-01

## 2024-08-01 RX ORDER — INSULIN GLARGINE 300 U/ML
INJECTION, SOLUTION SUBCUTANEOUS
Qty: 24 ML | Refills: 3 | Status: SHIPPED | OUTPATIENT
Start: 2024-08-01

## 2024-08-01 NOTE — PROGRESS NOTES
Crow Bonner is a 54 y.o. female is seen for the management of uncontrolled  type 2 diabetes and hypothyroidism .  Patient has complex medical problems including coronary artery disease , DOMENIC, CHF, breast cancer, fibromyalgia, hyperlipidemia, hypertension, obesity, asthma and depression  T2DM which is uncontrolled and is complicated with nephropathy and DOMENIC .  She got diabetic education in the past and at one point she was on an insulin pump.  She still has hypoglycemia awareness tends to watch her diet somewhat but her depression hinders in managing her diabetes.  Most of her medications are managed by her .  CROW BONNER was diagnosed with hypothyroidism in march 2010 and has been on Lt4 since then   she had GDM with her 2 kids ---she has been on insulin for years      Crow also has hypertension , GERD , hyperlipidemia ,she also has breast cancer s/p mastectomy and radiation and chemo &is on Tamixifen   she also has Asthma she is on Cpap for sleep apnea and wears Oxygen   She follows with a rheumatologist and is on hydroxychloroquine, previously was on methotrexate    INTERIM:    Diabetes  She presents for her follow-up diabetic visit. She has type 2 diabetes mellitus. No MedicAlert identification noted. The initial diagnosis of diabetes was made 17 years ago. Her disease course has been stable. Hypoglycemia symptoms include nervousness/anxiousness and sweats. Pertinent negatives for hypoglycemia include no dizziness or headaches. Associated symptoms include fatigue and weakness. There are no hypoglycemic complications. Symptoms are stable. Diabetic complications include autonomic neuropathy, heart disease and peripheral neuropathy. Risk factors for coronary artery disease include diabetes mellitus, post-menopausal, hypertension and dyslipidemia. Current diabetic treatment includes intensive insulin program. She is compliant with treatment some of the time. Her weight is stable. She is following a

## 2024-08-02 ENCOUNTER — TELEPHONE (OUTPATIENT)
Dept: ADMINISTRATIVE | Age: 54
End: 2024-08-02

## 2024-08-02 DIAGNOSIS — Z79.4 TYPE 2 DIABETES MELLITUS WITH OTHER SPECIFIED COMPLICATION, WITH LONG-TERM CURRENT USE OF INSULIN (HCC): Primary | ICD-10-CM

## 2024-08-02 DIAGNOSIS — E11.69 TYPE 2 DIABETES MELLITUS WITH OTHER SPECIFIED COMPLICATION, WITH LONG-TERM CURRENT USE OF INSULIN (HCC): Primary | ICD-10-CM

## 2024-08-02 NOTE — TELEPHONE ENCOUNTER
Submitted PA for Mounjaro 2.5MG/0.5ML pen-injectors  Via [facility] [id number]  STATUS: PENDING.    Follow up done daily; if no decision with in three days we will refax.  If another three days goes by with no decision will call the insurance for status.

## 2024-08-06 ASSESSMENT — ENCOUNTER SYMPTOMS
GASTROINTESTINAL NEGATIVE: 1
SHORTNESS OF BREATH: 1

## 2024-08-06 NOTE — PROGRESS NOTES
4/15/24   Summer Schneider MD   oxyCODONE-acetaminophen (PERCOCET)  MG per tablet Take 1 tablet by mouth every 6 hours as needed. 4/18/24   Summer Schneider MD   pregabalin (LYRICA) 75 MG capsule Take 1 capsule by mouth 2 times daily. 4/12/24   Summer Schneider MD   butalbital-acetaminophen-caffeine (FIORICET, ESGIC) -40 MG per tablet Take 1 tablet by mouth every 6 hours as needed for Headaches 4/30/24   Mariana Simental DO   bethanechol (URECHOLINE) 25 MG tablet Take 1 tablet by mouth in the morning and at bedtime 4/8/24   Marcia Shine MD   empagliflozin (JARDIANCE) 10 MG tablet Take 1 tablet by mouth daily 4/4/24   Nhung Fenrandez APRN - CNP   spironolactone (ALDACTONE) 50 MG tablet Take 1 tablet by mouth daily 3/28/24   Nhung Fernandez APRN - CNP   vitamin B-12 (CYANOCOBALAMIN) 1000 MCG tablet Take 1 tablet by mouth daily    Summer Schneidre MD   lubiprostone (AMITIZA) 24 MCG capsule TAKE 1 CAPSULE BY MOUTH TWO TIMES A DAY WITH MEALS  Patient taking differently: Take 1 capsule by mouth 2 times daily (with meals) 1/29/24   Mariana Simental DO   mineral oil-hydrophilic petrolatum (HYDROPHOR) ointment Apply topically 4 times daily as needed to hands and feet. 1/23/24   Mariana Simental DO   metoprolol tartrate (LOPRESSOR) 25 MG tablet TAKE 1 TABLET BY MOUTH TWO TIMES A DAY  Patient taking differently: Take 1 tablet by mouth 2 times daily 11/6/23   Jessa Parsons MD   rosuvastatin (CRESTOR) 20 MG tablet Take 1 tablet by mouth nightly 10/16/23   Mariana Simental DO   leflunomide (ARAVA) 20 MG tablet TAKE 1 TABLET BY MOUTH EVERY DAY  Patient taking differently: Take 1 tablet by mouth daily 8/21/23   Mariana Simental DO   sulfaSALAzine (AZULFIDINE) 500 MG tablet TAKE 1 TABLET BY MOUTH 2 TIMES DAILY 8/21/23   Mariana Simental DO   Magic Mouthwash (MIRACLE MOUTHWASH) Swish and spit 5 mLs 4 times daily as needed for Irritation 7/10/23   Mariana Simental, DO

## 2024-08-06 NOTE — TELEPHONE ENCOUNTER
The medication was DENIED;     Denied on August 2 by [place] [employer] 2017  Denied Medication: Mounjaro. Coverage is provided when the member meets all the following: Member has a history of at least 120 days of therapy with THREE preferred medications [ONE of the 120 day trials must be Byetta (5 mcg and 10 mcg), Victoza (18 MG/3 ML PEN)(Brand name is preferred by your plan) or Trulicity (0.75 mg, 1.5 mg, 3 mg and 4.5 mg)], which include but are not limited to: Farxiga 5 and 10 mg (Brand name is preferred by your plan), Glimepiride, Glipizide, Invokana 100 mg and 300 mg, Januvia, Jardiance 10 and 25 mg and Metformin IR and ER (generic of Glucophage XR). Member has had an inadequate clinical response (the inability to reach A1C goal (less than 7%) after at least 120 days of current regimen, with use of two or more drugs concomitantly per ADA (American Diabetes Association) guidelines and, Member has documented adherence and appropriate dose escalation (must achieve maximum recommended dose or document that maximum recommended dose is not tolerated or is clinically inappropriate). The member does not have sufficient paid pharmacy claims to support using Trulicity. Member has coverage for the time indicated of the trial of medication (s). Please explain how the member was receiving medication (samples are not accepted as trials). The [facility] Policy for Medical Necessity as posted on the [facility] website and [place] Unified Preferred Drug List criteria were reviewed and per [place] Administrative Code Rule 5160-1-01 (C) and 5160-26-03 (B), a medically necessary service must include: generally accepted standards of medical practice, be clinically appropriate in administration, treatment and outcome and be the lowest cost alternative to effectively treat the condition. Please contact your provider to assist you with other treatment options that might be covered under your benefit package, or other services that might be

## 2024-08-07 ENCOUNTER — OFFICE VISIT (OUTPATIENT)
Dept: CARDIOLOGY CLINIC | Age: 54
End: 2024-08-07
Payer: COMMERCIAL

## 2024-08-07 ENCOUNTER — TELEPHONE (OUTPATIENT)
Dept: ENDOCRINOLOGY | Age: 54
End: 2024-08-07

## 2024-08-07 VITALS
HEIGHT: 64 IN | DIASTOLIC BLOOD PRESSURE: 60 MMHG | HEART RATE: 64 BPM | WEIGHT: 202 LBS | SYSTOLIC BLOOD PRESSURE: 110 MMHG | OXYGEN SATURATION: 95 % | BODY MASS INDEX: 34.49 KG/M2

## 2024-08-07 DIAGNOSIS — I50.32 CHRONIC DIASTOLIC HEART FAILURE (HCC): Primary | ICD-10-CM

## 2024-08-07 DIAGNOSIS — R06.02 SHORTNESS OF BREATH: ICD-10-CM

## 2024-08-07 DIAGNOSIS — R60.0 LOCALIZED EDEMA: ICD-10-CM

## 2024-08-07 DIAGNOSIS — I10 ESSENTIAL HYPERTENSION: Chronic | ICD-10-CM

## 2024-08-07 PROCEDURE — 99214 OFFICE O/P EST MOD 30 MIN: CPT | Performed by: NURSE PRACTITIONER

## 2024-08-07 PROCEDURE — G8427 DOCREV CUR MEDS BY ELIG CLIN: HCPCS | Performed by: NURSE PRACTITIONER

## 2024-08-07 PROCEDURE — G8417 CALC BMI ABV UP PARAM F/U: HCPCS | Performed by: NURSE PRACTITIONER

## 2024-08-07 PROCEDURE — 3017F COLORECTAL CA SCREEN DOC REV: CPT | Performed by: NURSE PRACTITIONER

## 2024-08-07 PROCEDURE — 1036F TOBACCO NON-USER: CPT | Performed by: NURSE PRACTITIONER

## 2024-08-07 PROCEDURE — 3074F SYST BP LT 130 MM HG: CPT | Performed by: NURSE PRACTITIONER

## 2024-08-07 PROCEDURE — 3078F DIAST BP <80 MM HG: CPT | Performed by: NURSE PRACTITIONER

## 2024-08-07 RX ORDER — METOLAZONE 5 MG/1
5 TABLET ORAL WEEKLY
Qty: 12 TABLET | Refills: 1 | Status: SHIPPED | OUTPATIENT
Start: 2024-08-07

## 2024-08-07 NOTE — PATIENT INSTRUCTIONS
Instructions:   Medications:continue current meds with higher dose torsemide if wt 205 or higher, weekly infusions  Labs with infusions  [phone]

## 2024-08-08 ENCOUNTER — HOSPITAL ENCOUNTER (OUTPATIENT)
Dept: ONCOLOGY | Age: 54
Setting detail: INFUSION SERIES
Discharge: HOME | End: 2024-08-08
Payer: COMMERCIAL

## 2024-08-08 VITALS
SYSTOLIC BLOOD PRESSURE: 115 MMHG | BODY MASS INDEX: 35.45 KG/M2 | TEMPERATURE: 97.8 F | RESPIRATION RATE: 16 BRPM | DIASTOLIC BLOOD PRESSURE: 56 MMHG | WEIGHT: 206.5 LBS | OXYGEN SATURATION: 98 % | HEART RATE: 67 BPM

## 2024-08-08 DIAGNOSIS — I50.9 ACUTE ON CHRONIC CONGESTIVE HEART FAILURE, UNSPECIFIED HEART FAILURE TYPE (HCC): Primary | ICD-10-CM

## 2024-08-08 DIAGNOSIS — Z79.4 TYPE 2 DIABETES MELLITUS WITH OTHER SPECIFIED COMPLICATION, WITH LONG-TERM CURRENT USE OF INSULIN (HCC): ICD-10-CM

## 2024-08-08 DIAGNOSIS — E11.69 TYPE 2 DIABETES MELLITUS WITH OTHER SPECIFIED COMPLICATION, WITH LONG-TERM CURRENT USE OF INSULIN (HCC): ICD-10-CM

## 2024-08-08 DIAGNOSIS — I50.32 CHRONIC HEART FAILURE WITH PRESERVED EJECTION FRACTION (HCC): ICD-10-CM

## 2024-08-08 DIAGNOSIS — R06.02 SHORTNESS OF BREATH: ICD-10-CM

## 2024-08-08 LAB
ANION GAP SERPL CALCULATED.3IONS-SCNC: 12 MMOL/L (ref 3–16)
BUN SERPL-MCNC: 40 MG/DL (ref 7–20)
CALCIUM SERPL-MCNC: 9.5 MG/DL (ref 8.3–10.6)
CHLORIDE SERPL-SCNC: 96 MMOL/L (ref 99–110)
CO2 SERPL-SCNC: 30 MMOL/L (ref 21–32)
CREAT SERPL-MCNC: 1.6 MG/DL (ref 0.6–1.1)
GFR SERPLBLD CREATININE-BSD FMLA CKD-EPI: 38 ML/MIN/{1.73_M2}
GLUCOSE SERPL-MCNC: 199 MG/DL (ref 70–99)
NT-PROBNP SERPL-MCNC: 287 PG/ML (ref 0–124)
POTASSIUM SERPL-SCNC: 3.9 MMOL/L (ref 3.5–5.1)
SODIUM SERPL-SCNC: 138 MMOL/L (ref 136–145)

## 2024-08-08 PROCEDURE — 99211 OFF/OP EST MAY X REQ PHY/QHP: CPT

## 2024-08-08 PROCEDURE — 2580000003 HC RX 258: Performed by: INTERNAL MEDICINE

## 2024-08-08 PROCEDURE — 96366 THER/PROPH/DIAG IV INF ADDON: CPT

## 2024-08-08 PROCEDURE — 83880 ASSAY OF NATRIURETIC PEPTIDE: CPT

## 2024-08-08 PROCEDURE — 96375 TX/PRO/DX INJ NEW DRUG ADDON: CPT

## 2024-08-08 PROCEDURE — 6360000002 HC RX W HCPCS: Performed by: INTERNAL MEDICINE

## 2024-08-08 PROCEDURE — 96365 THER/PROPH/DIAG IV INF INIT: CPT

## 2024-08-08 PROCEDURE — 80048 BASIC METABOLIC PNL TOTAL CA: CPT

## 2024-08-08 RX ORDER — FUROSEMIDE 10 MG/ML
120 INJECTION INTRAMUSCULAR; INTRAVENOUS ONCE
Status: CANCELLED
Start: 2024-08-15 | End: 2024-08-15

## 2024-08-08 RX ORDER — SODIUM CHLORIDE 0.9 % (FLUSH) 0.9 %
5-40 SYRINGE (ML) INJECTION ONCE
Status: CANCELLED
Start: 2024-08-15 | End: 2024-08-15

## 2024-08-08 RX ORDER — DULAGLUTIDE 1.5 MG/.5ML
1.5 INJECTION, SOLUTION SUBCUTANEOUS WEEKLY
Qty: 2 ML | Refills: 3 | Status: SHIPPED | OUTPATIENT
Start: 2024-08-08

## 2024-08-08 RX ORDER — DULAGLUTIDE 1.5 MG/.5ML
1.5 INJECTION, SOLUTION SUBCUTANEOUS WEEKLY
Qty: 2 ML | Refills: 3 | Status: SHIPPED | OUTPATIENT
Start: 2024-08-08 | End: 2024-08-08 | Stop reason: SDUPTHER

## 2024-08-08 RX ORDER — FUROSEMIDE 10 MG/ML
120 INJECTION INTRAMUSCULAR; INTRAVENOUS ONCE
Status: COMPLETED | OUTPATIENT
Start: 2024-08-08 | End: 2024-08-08

## 2024-08-08 RX ORDER — SODIUM CHLORIDE 0.9 % (FLUSH) 0.9 %
5-40 SYRINGE (ML) INJECTION ONCE
Status: COMPLETED | OUTPATIENT
Start: 2024-08-08 | End: 2024-08-08

## 2024-08-08 RX ORDER — LUBIPROSTONE 24 UG/1
CAPSULE ORAL
Qty: 180 CAPSULE | Refills: 0 | Status: SHIPPED | OUTPATIENT
Start: 2024-08-08

## 2024-08-08 RX ADMIN — FUROSEMIDE 20 MG/HR: 10 INJECTION, SOLUTION INTRAMUSCULAR; INTRAVENOUS at 10:49

## 2024-08-08 RX ADMIN — FUROSEMIDE 120 MG: 10 INJECTION, SOLUTION INTRAMUSCULAR; INTRAVENOUS at 10:38

## 2024-08-08 RX ADMIN — SODIUM CHLORIDE, PRESERVATIVE FREE 10 ML: 5 INJECTION INTRAVENOUS at 10:37

## 2024-08-08 NOTE — PROGRESS NOTES
Lasix infusion 20 mg/hr over 4 hours completed. Pt tolerated well, voided in bathroom multiple times during infusion. Gait steady. Patient continues with SOB, no change in SOB from beginning of treatment to end.. SaO2 99% with O2 at 2l/NC. . Pt discharged per wheel chair to car accompanied by .  To return next week for weekly IV labs and lasix.

## 2024-08-15 ENCOUNTER — HOSPITAL ENCOUNTER (OUTPATIENT)
Dept: ONCOLOGY | Age: 54
Setting detail: INFUSION SERIES
Discharge: HOME | End: 2024-08-15
Payer: COMMERCIAL

## 2024-08-15 ENCOUNTER — TELEPHONE (OUTPATIENT)
Dept: CARDIOLOGY CLINIC | Age: 54
End: 2024-08-15

## 2024-08-15 VITALS
SYSTOLIC BLOOD PRESSURE: 113 MMHG | DIASTOLIC BLOOD PRESSURE: 66 MMHG | WEIGHT: 209 LBS | RESPIRATION RATE: 16 BRPM | HEART RATE: 66 BPM | TEMPERATURE: 97.4 F | BODY MASS INDEX: 35.87 KG/M2

## 2024-08-15 DIAGNOSIS — R06.02 SHORTNESS OF BREATH: ICD-10-CM

## 2024-08-15 DIAGNOSIS — I50.32 CHRONIC HEART FAILURE WITH PRESERVED EJECTION FRACTION (HCC): ICD-10-CM

## 2024-08-15 DIAGNOSIS — I50.9 ACUTE ON CHRONIC CONGESTIVE HEART FAILURE, UNSPECIFIED HEART FAILURE TYPE (HCC): Primary | ICD-10-CM

## 2024-08-15 LAB
ANION GAP SERPL CALCULATED.3IONS-SCNC: 11 MMOL/L (ref 3–16)
BUN SERPL-MCNC: 38 MG/DL (ref 7–20)
CALCIUM SERPL-MCNC: 9.2 MG/DL (ref 8.3–10.6)
CHLORIDE SERPL-SCNC: 94 MMOL/L (ref 99–110)
CO2 SERPL-SCNC: 34 MMOL/L (ref 21–32)
CREAT SERPL-MCNC: 1.3 MG/DL (ref 0.6–1.1)
GFR SERPLBLD CREATININE-BSD FMLA CKD-EPI: 48 ML/MIN/{1.73_M2}
GLUCOSE SERPL-MCNC: 119 MG/DL (ref 70–99)
NT-PROBNP SERPL-MCNC: 271 PG/ML (ref 0–124)
POTASSIUM SERPL-SCNC: 4.4 MMOL/L (ref 3.5–5.1)
SODIUM SERPL-SCNC: 139 MMOL/L (ref 136–145)

## 2024-08-15 PROCEDURE — 2580000003 HC RX 258: Performed by: INTERNAL MEDICINE

## 2024-08-15 PROCEDURE — 6360000002 HC RX W HCPCS: Performed by: INTERNAL MEDICINE

## 2024-08-15 PROCEDURE — 80048 BASIC METABOLIC PNL TOTAL CA: CPT

## 2024-08-15 PROCEDURE — 99211 OFF/OP EST MAY X REQ PHY/QHP: CPT

## 2024-08-15 PROCEDURE — 83880 ASSAY OF NATRIURETIC PEPTIDE: CPT

## 2024-08-15 PROCEDURE — 96375 TX/PRO/DX INJ NEW DRUG ADDON: CPT

## 2024-08-15 PROCEDURE — 96366 THER/PROPH/DIAG IV INF ADDON: CPT

## 2024-08-15 PROCEDURE — 96365 THER/PROPH/DIAG IV INF INIT: CPT

## 2024-08-15 RX ORDER — SODIUM CHLORIDE 0.9 % (FLUSH) 0.9 %
5-40 SYRINGE (ML) INJECTION ONCE
Status: CANCELLED
Start: 2024-08-22 | End: 2024-08-22

## 2024-08-15 RX ORDER — FUROSEMIDE 10 MG/ML
120 INJECTION INTRAMUSCULAR; INTRAVENOUS ONCE
Status: CANCELLED
Start: 2024-08-22 | End: 2024-08-22

## 2024-08-15 RX ORDER — FUROSEMIDE 10 MG/ML
120 INJECTION INTRAMUSCULAR; INTRAVENOUS ONCE
Status: COMPLETED | OUTPATIENT
Start: 2024-08-15 | End: 2024-08-15

## 2024-08-15 RX ORDER — SODIUM CHLORIDE 0.9 % (FLUSH) 0.9 %
5-40 SYRINGE (ML) INJECTION ONCE
Status: COMPLETED | OUTPATIENT
Start: 2024-08-15 | End: 2024-08-15

## 2024-08-15 RX ADMIN — FUROSEMIDE 120 MG: 10 INJECTION, SOLUTION INTRAMUSCULAR; INTRAVENOUS at 10:10

## 2024-08-15 RX ADMIN — Medication 10 ML: at 10:09

## 2024-08-15 RX ADMIN — FUROSEMIDE 20 MG/HR: 10 INJECTION, SOLUTION INTRAMUSCULAR; INTRAVENOUS at 10:16

## 2024-08-15 NOTE — PROGRESS NOTES
Pt to Dept for Lasix IVP and Lasix gtt as ordered from Dr. Parsons. Weight obtained and Labs drawn this AM. Results reviewed per Nhung Fernandez CNP ,Ok to proceed with Tx as ordered today. Pt took Metolazone dose this morning.  Lasix 120mg given IVP over 6 minutes followed by Lasix gtt started at 20mg/hr for 4 hours. Pt scheduled to return next Wednesday for same treatment. Pt discharged home at this time per W/C with Home O2 with .

## 2024-08-15 NOTE — TELEPHONE ENCOUNTER
----- Message from HERMES Fay CNP sent at 8/15/2024  2:25 PM EDT -----  Labs reviewed with infusion RN, ok for lasix today. RENAE    Spoke to patient's spouse he stated her weight today is 217-218, she has lost a few pounds. She had IV lasix earlier today.

## 2024-08-19 ENCOUNTER — TELEPHONE (OUTPATIENT)
Dept: INTERNAL MEDICINE CLINIC | Age: 54
End: 2024-08-19

## 2024-08-19 NOTE — TELEPHONE ENCOUNTER
Called [facility] for . I let him know that Dr. Simental need a face-to-face for this. Patient scheduled 8/30/24 at 8:40am.

## 2024-08-19 NOTE — TELEPHONE ENCOUNTER
----- Message from Jp SUNG sent at 8/19/2024  1:36 PM EDT -----  Regarding: ECC Message to Provider  ECC Message to Provider    Relationship to Patient: Spouse/Partner   donta chavez    Additional Information requesting need to fill up the form about the wheelchair  --------------------------------------------------------------------------------------------------------------------------    Call Back Information: OK to leave message on voicemail  Preferred Call Back Number: Phone  [phone]   Continue home trazodone QHS.

## 2024-08-21 ENCOUNTER — HOSPITAL ENCOUNTER (OUTPATIENT)
Dept: ONCOLOGY | Age: 54
Setting detail: INFUSION SERIES
Discharge: HOME | End: 2024-08-21
Payer: COMMERCIAL

## 2024-08-21 VITALS
DIASTOLIC BLOOD PRESSURE: 68 MMHG | BODY MASS INDEX: 36.15 KG/M2 | RESPIRATION RATE: 16 BRPM | WEIGHT: 210.6 LBS | TEMPERATURE: 98.3 F | HEART RATE: 66 BPM | SYSTOLIC BLOOD PRESSURE: 92 MMHG

## 2024-08-21 DIAGNOSIS — I50.9 ACUTE ON CHRONIC CONGESTIVE HEART FAILURE, UNSPECIFIED HEART FAILURE TYPE (HCC): Primary | ICD-10-CM

## 2024-08-21 DIAGNOSIS — R06.02 SHORTNESS OF BREATH: ICD-10-CM

## 2024-08-21 DIAGNOSIS — I50.32 CHRONIC HEART FAILURE WITH PRESERVED EJECTION FRACTION (HCC): ICD-10-CM

## 2024-08-21 LAB
ANION GAP SERPL CALCULATED.3IONS-SCNC: 12 MMOL/L (ref 3–16)
BUN SERPL-MCNC: 34 MG/DL (ref 7–20)
CALCIUM SERPL-MCNC: 9.1 MG/DL (ref 8.3–10.6)
CHLORIDE SERPL-SCNC: 89 MMOL/L (ref 99–110)
CO2 SERPL-SCNC: 32 MMOL/L (ref 21–32)
CREAT SERPL-MCNC: 1.4 MG/DL (ref 0.6–1.1)
GFR SERPLBLD CREATININE-BSD FMLA CKD-EPI: 44 ML/MIN/{1.73_M2}
GLUCOSE SERPL-MCNC: 122 MG/DL (ref 70–99)
NT-PROBNP SERPL-MCNC: 220 PG/ML (ref 0–124)
POTASSIUM SERPL-SCNC: 3.9 MMOL/L (ref 3.5–5.1)
SODIUM SERPL-SCNC: 133 MMOL/L (ref 136–145)

## 2024-08-21 PROCEDURE — 96365 THER/PROPH/DIAG IV INF INIT: CPT

## 2024-08-21 PROCEDURE — 83880 ASSAY OF NATRIURETIC PEPTIDE: CPT

## 2024-08-21 PROCEDURE — 2580000003 HC RX 258: Performed by: INTERNAL MEDICINE

## 2024-08-21 PROCEDURE — 96374 THER/PROPH/DIAG INJ IV PUSH: CPT

## 2024-08-21 PROCEDURE — 6360000002 HC RX W HCPCS: Performed by: INTERNAL MEDICINE

## 2024-08-21 PROCEDURE — 99211 OFF/OP EST MAY X REQ PHY/QHP: CPT

## 2024-08-21 PROCEDURE — 80048 BASIC METABOLIC PNL TOTAL CA: CPT

## 2024-08-21 PROCEDURE — 96366 THER/PROPH/DIAG IV INF ADDON: CPT

## 2024-08-21 RX ORDER — SODIUM CHLORIDE 0.9 % (FLUSH) 0.9 %
5-40 SYRINGE (ML) INJECTION ONCE
Status: COMPLETED | OUTPATIENT
Start: 2024-08-21 | End: 2024-08-21

## 2024-08-21 RX ORDER — SODIUM CHLORIDE 0.9 % (FLUSH) 0.9 %
5-40 SYRINGE (ML) INJECTION ONCE
Status: CANCELLED
Start: 2024-08-22 | End: 2024-08-22

## 2024-08-21 RX ORDER — FUROSEMIDE 10 MG/ML
120 INJECTION INTRAMUSCULAR; INTRAVENOUS ONCE
Status: CANCELLED
Start: 2024-08-22 | End: 2024-08-22

## 2024-08-21 RX ORDER — FUROSEMIDE 10 MG/ML
120 INJECTION INTRAMUSCULAR; INTRAVENOUS ONCE
Status: COMPLETED | OUTPATIENT
Start: 2024-08-21 | End: 2024-08-21

## 2024-08-21 RX ADMIN — FUROSEMIDE 120 MG: 10 INJECTION, SOLUTION INTRAMUSCULAR; INTRAVENOUS at 10:37

## 2024-08-21 RX ADMIN — SODIUM CHLORIDE, PRESERVATIVE FREE 10 ML: 5 INJECTION INTRAVENOUS at 10:43

## 2024-08-21 RX ADMIN — FUROSEMIDE 20 MG/HR: 10 INJECTION, SOLUTION INTRAMUSCULAR; INTRAVENOUS at 10:54

## 2024-08-21 NOTE — PROGRESS NOTES
Pt to Dept for Lasix IVP and Lasix gtt as ordered from Dr. Parsons. Weight obtained and Labs drawn this AM. Results reviewed per Nhung Fernandez CNP ,Ok to proceed with Tx as ordered today.   Lasix 120mg given IVP over 10 minutes followed by Lasix gtt started at 20mg/hr for 4 hours. Pt scheduled to return next Wednesday for same treatment. Pt discharged home at this time per W/C with Home O2 with .  Patient educated that last blood pressure reading is 92/66,  denies dizziness, patient reports she has a blood pressure cuff at home and can check it upon arrival home.

## 2024-08-24 NOTE — PROGRESS NOTES
Lidocaine HCl (ASPERCREME LIDOCAINE) 4 % CREA Apply to affected areas topically 4 times daily as needed for pain. 133 g 1    torsemide (DEMADEX) 100 MG tablet Take 1 tablet by mouth 2 times daily .Take until weight gets to 205 pounds. Then reduce dose to 100mg in the AM and 50mg at 4 PM. 90 tablet 3    montelukast (SINGULAIR) 10 MG tablet TAKE 1 TABLET BY MOUTH AT NIGHT 30 tablet 5    ferrous sulfate (FEROSUL) 325 (65 Fe) MG tablet TAKE 1 TABLET BY MOUTH 2 TIMES A DAY WITH MEALS 180 tablet 1    allopurinol (ZYLOPRIM) 100 MG tablet Take 1 tablet by mouth daily      methocarbamol (ROBAXIN) 750 MG tablet Take 1 tablet by mouth 4 times daily as needed      oxyCODONE-acetaminophen (PERCOCET)  MG per tablet Take 1 tablet by mouth every 6 hours as needed.      pregabalin (LYRICA) 75 MG capsule Take 1 capsule by mouth 2 times daily.      butalbital-acetaminophen-caffeine (FIORICET, ESGIC) -40 MG per tablet Take 1 tablet by mouth every 6 hours as needed for Headaches 30 tablet 1    bethanechol (URECHOLINE) 25 MG tablet Take 1 tablet by mouth in the morning and at bedtime 60 tablet 0    empagliflozin (JARDIANCE) 10 MG tablet Take 1 tablet by mouth daily 90 tablet 1    spironolactone (ALDACTONE) 50 MG tablet Take 1 tablet by mouth daily 90 tablet 1    vitamin B-12 (CYANOCOBALAMIN) 1000 MCG tablet Take 1 tablet by mouth daily      mineral oil-hydrophilic petrolatum (HYDROPHOR) ointment Apply topically 4 times daily as needed to hands and feet. 454 g 5    metoprolol tartrate (LOPRESSOR) 25 MG tablet TAKE 1 TABLET BY MOUTH TWO TIMES A DAY (Patient taking differently: Take 1 tablet by mouth 2 times daily) 180 tablet 3    rosuvastatin (CRESTOR) 20 MG tablet Take 1 tablet by mouth nightly 90 tablet 3    leflunomide (ARAVA) 20 MG tablet TAKE 1 TABLET BY MOUTH EVERY DAY (Patient taking differently: Take 1 tablet by mouth daily) 30 tablet 0    sulfaSALAzine (AZULFIDINE) 500 MG tablet TAKE 1 TABLET BY MOUTH 2 TIMES DAILY 60  tablet 0    Magic Mouthwash (MIRACLE MOUTHWASH) Swish and spit 5 mLs 4 times daily as needed for Irritation 300 mL 1    nitroGLYCERIN (NITROSTAT) 0.4 MG SL tablet PLACE ONE TABLET UNDER TONGUE AS NEEDED FOR CHEST PAIN, MAY REPEAT EVERY 5 MINUTES AS NEEDED UP TO A MAX OF 3 TABLETS.  IF NO RELIEF AFTER 1 DOSE, CALL 911. 25 tablet 2    fluticasone-salmeterol (ADVAIR DISKUS) 250-50 MCG/ACT AEPB diskus inhaler Inhale 1 puff into the lungs 2 times daily 3 each 3    meclizine (ANTIVERT) 25 MG tablet Take 1 tablet by mouth 3 times daily as needed for Dizziness 30 tablet 2    polyethylene glycol (GLYCOLAX) 17 GM/SCOOP powder TAKE 17 GRAMS (1 CAPFUL) BY MOUTH NIGHTLY 510 g 0    clopidogrel (PLAVIX) 75 MG tablet Take 1 tablet by mouth daily 30 tablet 3    Compression Sleeves Left arm 2 each 0    Elastic Bandages & Supports (COMPRESSION & SUPPORT GLOVES L) MISC 1 each by Does not apply route daily Left hand 2 each 0    Elastic Bandages & Supports (MEDICAL COMPRESSION STOCKINGS) MISC 2 each by Does not apply route daily On am/off HS. 2 each 0    Urea 40 % LOTN Apply to feet nightly and cover with Aquaphor 325 mL 5    SM SENNA-S 8.6-50 MG per tablet TAKE 2 TABLETS BY MOUTH TWO TIMES A DAY (Patient taking differently: Take 2 tablets by mouth 2 times daily) 360 tablet 5    Continuous Blood Gluc Sensor (FREESTYLE EMERALD 2 SENSOR) MISC Change every 14 days 2 each 5    Blood Glucose Monitoring Suppl (ONETOUCH VERIO) w/Device KIT Use to check glucose 4 times daily. 1 kit 0    Compression Stockings MISC by Does not apply route Zippered stockings for daily use on lower extremities (do not wear at night). 20-30mmHg. 1 each 1    Continuous Blood Gluc  (FREESTYLE EMERALD 2 READER) MINDY To check glucose levels 1 each 0    albuterol (PROVENTIL) (2.5 MG/3ML) 0.083% nebulizer solution Take 3 mLs by nebulization every 6 hours as needed for Wheezing or Shortness of Breath 120 each 1    brexpiprazole (REXULTI) 4 MG TABS tablet Take 0.5

## 2024-08-27 NOTE — ASSESSMENT & PLAN NOTE
Left message on pt's voicemail conveying results and recommendation below per MD. Writer left call back number if pt has further questions or concerns.   Works on diet.

## 2024-08-29 ENCOUNTER — HOSPITAL ENCOUNTER (OUTPATIENT)
Dept: ONCOLOGY | Age: 54
Setting detail: INFUSION SERIES
Discharge: HOME | End: 2024-08-29
Payer: COMMERCIAL

## 2024-08-29 VITALS
WEIGHT: 207.1 LBS | SYSTOLIC BLOOD PRESSURE: 144 MMHG | DIASTOLIC BLOOD PRESSURE: 79 MMHG | RESPIRATION RATE: 16 BRPM | HEART RATE: 68 BPM | BODY MASS INDEX: 35.55 KG/M2 | TEMPERATURE: 97.6 F

## 2024-08-29 DIAGNOSIS — I50.9 ACUTE ON CHRONIC CONGESTIVE HEART FAILURE, UNSPECIFIED HEART FAILURE TYPE (HCC): Primary | ICD-10-CM

## 2024-08-29 DIAGNOSIS — I50.32 CHRONIC HEART FAILURE WITH PRESERVED EJECTION FRACTION (HCC): ICD-10-CM

## 2024-08-29 DIAGNOSIS — R06.02 SHORTNESS OF BREATH: ICD-10-CM

## 2024-08-29 LAB
ANION GAP SERPL CALCULATED.3IONS-SCNC: 12 MMOL/L (ref 3–16)
BUN SERPL-MCNC: 35 MG/DL (ref 7–20)
CALCIUM SERPL-MCNC: 8.9 MG/DL (ref 8.3–10.6)
CHLORIDE SERPL-SCNC: 102 MMOL/L (ref 99–110)
CO2 SERPL-SCNC: 31 MMOL/L (ref 21–32)
CREAT SERPL-MCNC: 1.2 MG/DL (ref 0.6–1.1)
GFR SERPLBLD CREATININE-BSD FMLA CKD-EPI: 53 ML/MIN/{1.73_M2}
GLUCOSE SERPL-MCNC: 90 MG/DL (ref 70–99)
NT-PROBNP SERPL-MCNC: 362 PG/ML (ref 0–124)
POTASSIUM SERPL-SCNC: 3.7 MMOL/L (ref 3.5–5.1)
SODIUM SERPL-SCNC: 145 MMOL/L (ref 136–145)

## 2024-08-29 PROCEDURE — 80048 BASIC METABOLIC PNL TOTAL CA: CPT

## 2024-08-29 PROCEDURE — 99211 OFF/OP EST MAY X REQ PHY/QHP: CPT

## 2024-08-29 PROCEDURE — 96372 THER/PROPH/DIAG INJ SC/IM: CPT

## 2024-08-29 PROCEDURE — 96365 THER/PROPH/DIAG IV INF INIT: CPT

## 2024-08-29 PROCEDURE — 6360000002 HC RX W HCPCS: Performed by: INTERNAL MEDICINE

## 2024-08-29 PROCEDURE — 83880 ASSAY OF NATRIURETIC PEPTIDE: CPT

## 2024-08-29 PROCEDURE — 2580000003 HC RX 258: Performed by: INTERNAL MEDICINE

## 2024-08-29 PROCEDURE — 96366 THER/PROPH/DIAG IV INF ADDON: CPT

## 2024-08-29 RX ORDER — FUROSEMIDE 10 MG/ML
120 INJECTION INTRAMUSCULAR; INTRAVENOUS ONCE
Status: COMPLETED | OUTPATIENT
Start: 2024-08-29 | End: 2024-08-29

## 2024-08-29 RX ORDER — FUROSEMIDE 10 MG/ML
120 INJECTION INTRAMUSCULAR; INTRAVENOUS ONCE
Start: 2024-09-05 | End: 2024-09-05

## 2024-08-29 RX ORDER — SODIUM CHLORIDE 0.9 % (FLUSH) 0.9 %
5-40 SYRINGE (ML) INJECTION ONCE
Start: 2024-09-05 | End: 2024-09-05

## 2024-08-29 RX ORDER — SODIUM CHLORIDE 0.9 % (FLUSH) 0.9 %
5-40 SYRINGE (ML) INJECTION ONCE
Status: COMPLETED | OUTPATIENT
Start: 2024-08-29 | End: 2024-08-29

## 2024-08-29 RX ADMIN — Medication 10 ML: at 10:17

## 2024-08-29 RX ADMIN — FUROSEMIDE 120 MG: 10 INJECTION, SOLUTION INTRAMUSCULAR; INTRAVENOUS at 10:18

## 2024-08-29 RX ADMIN — FUROSEMIDE 20 MG/HR: 10 INJECTION, SOLUTION INTRAMUSCULAR; INTRAVENOUS at 10:26

## 2024-08-29 NOTE — PROGRESS NOTES
Lasix infusion 20 mg/hr over 4 hours completed. Pt tolerated well, voided in bathroom multiple times during infusion. Gait steady. Patient continues with some SOB, no change in SOB from beginning of treatment to end.. SaO2 99% with O2 at 2l/NC. . Pt discharged per wheel chair to car accompanied by .  To return next week for weekly IV labs and lasix.

## 2024-08-30 ENCOUNTER — OFFICE VISIT (OUTPATIENT)
Dept: INTERNAL MEDICINE CLINIC | Age: 54
End: 2024-08-30
Payer: COMMERCIAL

## 2024-08-30 VITALS
HEIGHT: 64 IN | SYSTOLIC BLOOD PRESSURE: 108 MMHG | DIASTOLIC BLOOD PRESSURE: 64 MMHG | BODY MASS INDEX: 34.21 KG/M2 | HEART RATE: 68 BPM | WEIGHT: 200.4 LBS | OXYGEN SATURATION: 96 %

## 2024-08-30 DIAGNOSIS — E89.89 LYMPHEDEMA OF UPPER EXTREMITY FOLLOWING LYMPHADENECTOMY: ICD-10-CM

## 2024-08-30 DIAGNOSIS — Z76.89 ENCOUNTER FOR WHEELCHAIR ASSESSMENT: ICD-10-CM

## 2024-08-30 DIAGNOSIS — I50.32 CHRONIC HEART FAILURE WITH PRESERVED EJECTION FRACTION (HCC): Chronic | ICD-10-CM

## 2024-08-30 DIAGNOSIS — M06.00 SERONEGATIVE RHEUMATOID ARTHRITIS (HCC): Primary | ICD-10-CM

## 2024-08-30 DIAGNOSIS — E11.42 DIABETIC POLYNEUROPATHY ASSOCIATED WITH TYPE 2 DIABETES MELLITUS (HCC): ICD-10-CM

## 2024-08-30 DIAGNOSIS — R29.898 LEFT LEG WEAKNESS: ICD-10-CM

## 2024-08-30 DIAGNOSIS — I89.0 LYMPHEDEMA OF UPPER EXTREMITY FOLLOWING LYMPHADENECTOMY: ICD-10-CM

## 2024-08-30 DIAGNOSIS — R29.898 WEAKNESS OF RIGHT UPPER EXTREMITY: ICD-10-CM

## 2024-08-30 DIAGNOSIS — M51.36 DDD (DEGENERATIVE DISC DISEASE), LUMBAR: ICD-10-CM

## 2024-08-30 DIAGNOSIS — M50.30 DDD (DEGENERATIVE DISC DISEASE), CERVICAL: ICD-10-CM

## 2024-08-30 PROCEDURE — 3078F DIAST BP <80 MM HG: CPT | Performed by: INTERNAL MEDICINE

## 2024-08-30 PROCEDURE — 2022F DILAT RTA XM EVC RTNOPTHY: CPT | Performed by: INTERNAL MEDICINE

## 2024-08-30 PROCEDURE — G8427 DOCREV CUR MEDS BY ELIG CLIN: HCPCS | Performed by: INTERNAL MEDICINE

## 2024-08-30 PROCEDURE — 3017F COLORECTAL CA SCREEN DOC REV: CPT | Performed by: INTERNAL MEDICINE

## 2024-08-30 PROCEDURE — 3052F HG A1C>EQUAL 8.0%<EQUAL 9.0%: CPT | Performed by: INTERNAL MEDICINE

## 2024-08-30 PROCEDURE — 1036F TOBACCO NON-USER: CPT | Performed by: INTERNAL MEDICINE

## 2024-08-30 PROCEDURE — G8417 CALC BMI ABV UP PARAM F/U: HCPCS | Performed by: INTERNAL MEDICINE

## 2024-08-30 PROCEDURE — 3074F SYST BP LT 130 MM HG: CPT | Performed by: INTERNAL MEDICINE

## 2024-08-30 PROCEDURE — 99214 OFFICE O/P EST MOD 30 MIN: CPT | Performed by: INTERNAL MEDICINE

## 2024-08-30 NOTE — ASSESSMENT & PLAN NOTE
Patient requires manual wheelchair for assistance with traveling longer distances within the home and for appointments.  These requirements can be met with the use of a manual chair.  Patient is unable to self propel as she has chronic shoulder pain, severe lymphedema in her left upper extremity, lumbar spinal stenosis, and chronic lower extremity edema in her legs.  Form completed for Medicare.

## 2024-08-30 NOTE — ASSESSMENT & PLAN NOTE
Stable.  Remains on Lyrica 75 mg twice daily.  Diabetes is managed by Dr. Taylor.  Diabetic neuropathy impacts patient's ability to ambulate longer distances safely.

## 2024-08-30 NOTE — ASSESSMENT & PLAN NOTE
Progressive.  Continues to get weekly Lasix infusions.  Still better following weekly Lasix infusions and then feels progressively worse throughout the week.  Continues to follow with Dr. Parsons for cardiology. Impacts ability to walk longer distances.  Requires home oxygen.  Patient gets very short of breath with walking short distances.

## 2024-08-30 NOTE — ASSESSMENT & PLAN NOTE
Patient has known degenerative disc disease of the cervical spine.  Impacts patient's ability to self propel with a wheelchair.

## 2024-08-30 NOTE — ASSESSMENT & PLAN NOTE
MRI of lumbar spine did show large left paracentral disc extrusion at L5-S1 with high-grade canal effacement and moderate canal stenosis at L3-4 with moderate narrowing of left neural foramina.  Impacts patient's ability to walk longer distances.  Requires wheelchair for ambulating longer distances.  Is unable to self repair due to lower extremity weakness associated with lumbar spinal stenosis.

## 2024-08-30 NOTE — ASSESSMENT & PLAN NOTE
Uncontrolled but stable.  Lymphedema impacts patient's ability to self propel.  Does have lymphedema sleeve that she wears.  Does have lymphedema pump but she does not use it as it is painful.

## 2024-09-04 RX ORDER — FOLIC ACID 1 MG/1
TABLET ORAL
Qty: 90 TABLET | Refills: 0 | Status: SHIPPED | OUTPATIENT
Start: 2024-09-04

## 2024-09-05 ENCOUNTER — HOSPITAL ENCOUNTER (OUTPATIENT)
Dept: ONCOLOGY | Age: 54
Setting detail: INFUSION SERIES
Discharge: HOME | End: 2024-09-05
Payer: COMMERCIAL

## 2024-09-05 VITALS
RESPIRATION RATE: 16 BRPM | WEIGHT: 206.7 LBS | BODY MASS INDEX: 35.48 KG/M2 | HEART RATE: 70 BPM | TEMPERATURE: 97.2 F | SYSTOLIC BLOOD PRESSURE: 128 MMHG | DIASTOLIC BLOOD PRESSURE: 65 MMHG

## 2024-09-05 DIAGNOSIS — I50.9 ACUTE ON CHRONIC CONGESTIVE HEART FAILURE, UNSPECIFIED HEART FAILURE TYPE (HCC): Primary | ICD-10-CM

## 2024-09-05 DIAGNOSIS — R06.02 SHORTNESS OF BREATH: ICD-10-CM

## 2024-09-05 DIAGNOSIS — I50.32 CHRONIC HEART FAILURE WITH PRESERVED EJECTION FRACTION (HCC): ICD-10-CM

## 2024-09-05 LAB
ANION GAP SERPL CALCULATED.3IONS-SCNC: 12 MMOL/L (ref 3–16)
BUN SERPL-MCNC: 33 MG/DL (ref 7–20)
CALCIUM SERPL-MCNC: 9.1 MG/DL (ref 8.3–10.6)
CHLORIDE SERPL-SCNC: 101 MMOL/L (ref 99–110)
CO2 SERPL-SCNC: 29 MMOL/L (ref 21–32)
CREAT SERPL-MCNC: 1 MG/DL (ref 0.6–1.1)
GFR SERPLBLD CREATININE-BSD FMLA CKD-EPI: 66 ML/MIN/{1.73_M2}
GLUCOSE SERPL-MCNC: 106 MG/DL (ref 70–99)
NT-PROBNP SERPL-MCNC: 388 PG/ML (ref 0–124)
POTASSIUM SERPL-SCNC: 4.4 MMOL/L (ref 3.5–5.1)
SODIUM SERPL-SCNC: 142 MMOL/L (ref 136–145)

## 2024-09-05 PROCEDURE — 96366 THER/PROPH/DIAG IV INF ADDON: CPT

## 2024-09-05 PROCEDURE — 83880 ASSAY OF NATRIURETIC PEPTIDE: CPT

## 2024-09-05 PROCEDURE — 6360000002 HC RX W HCPCS: Performed by: INTERNAL MEDICINE

## 2024-09-05 PROCEDURE — 2580000003 HC RX 258: Performed by: INTERNAL MEDICINE

## 2024-09-05 PROCEDURE — 99211 OFF/OP EST MAY X REQ PHY/QHP: CPT

## 2024-09-05 PROCEDURE — 96375 TX/PRO/DX INJ NEW DRUG ADDON: CPT

## 2024-09-05 PROCEDURE — 96365 THER/PROPH/DIAG IV INF INIT: CPT

## 2024-09-05 PROCEDURE — 80048 BASIC METABOLIC PNL TOTAL CA: CPT

## 2024-09-05 RX ORDER — SODIUM CHLORIDE 0.9 % (FLUSH) 0.9 %
5-40 SYRINGE (ML) INJECTION ONCE
Status: DISCONTINUED | OUTPATIENT
Start: 2024-09-05 | End: 2024-09-06 | Stop reason: HOSPADM

## 2024-09-05 RX ORDER — SODIUM CHLORIDE 0.9 % (FLUSH) 0.9 %
5-40 SYRINGE (ML) INJECTION ONCE
Status: CANCELLED
Start: 2024-09-12 | End: 2024-09-12

## 2024-09-05 RX ORDER — FUROSEMIDE 10 MG/ML
120 INJECTION INTRAMUSCULAR; INTRAVENOUS ONCE
Status: COMPLETED | OUTPATIENT
Start: 2024-09-05 | End: 2024-09-05

## 2024-09-05 RX ORDER — FUROSEMIDE 10 MG/ML
120 INJECTION INTRAMUSCULAR; INTRAVENOUS ONCE
Status: CANCELLED
Start: 2024-09-12 | End: 2024-09-12

## 2024-09-05 RX ADMIN — FUROSEMIDE 120 MG: 10 INJECTION, SOLUTION INTRAMUSCULAR; INTRAVENOUS at 10:30

## 2024-09-05 RX ADMIN — FUROSEMIDE 20 MG/HR: 10 INJECTION, SOLUTION INTRAMUSCULAR; INTRAVENOUS at 10:40

## 2024-09-05 NOTE — PROGRESS NOTES
CHIEF COMPLAINT(S):   Chief Complaint   Patient presents with   • Right Elbow - Follow-up       HISTORY OF PRESENT ILLNESS:  Avtar Blank is a 38 year old right-handed male presenting for a follow up for right lateral epicondylosis and right hand numbness and tingling.  Patient was last seen on 1/9/23 and was advised to wear volar wrist brace at nighttime and counter force brace during the day, ice/ice massage, use Tylenol as needed, attend physical therapy, perform home exercise program, avoid painful/repetitive activities, call for nitro patches, and obtain EMG. Patient attended 3 visits of physical therapy and was discharged 02/13/2023. Patient reports wearing the counterforce brace during the day. He states that he tried the volar wrist brace for 4 days and then discontinued due to no relief and believing that most of his symptoms came from his elbow rather than his wrist. Patient reports icing once a day. He performs his home exercises 2-3 times a day. At present, patient rates his pain 6/10, describes it as sharp and localizes it to the posterior aspect of the elbow. He states that gripping objects and forearm supination aggravates the pain. Patient states that the numbness in his hand is happening less frequently, probably once a week now, after he wakes up in the morning. He has not called to schedule the EMG yet due to busy schedule.     Accompanied by self  Location: right elbow  Date of Onset: October 2022  Context: see above   Severity: 6/10   Timing: intermittent  Quality: sharp  Associated signs and symptoms: occasional numbness  Aggravating factors: gripping objects and forearm supination   Alleviating factors: nothing  Patient feels at 70/100.  COVID Vaccine status: 12/18/21   - likes to work on cars in his free time    Roomed By: Ricardo PENA ATC    REVIEW OF SYSTEMS:  Musculoskeletal:  see above HPI    Otherwise, see patient history.    Current Outpatient Medications   Medication  Pt to Dept for Lasix IVP and Lasix gtt as ordered from Dr. Parsons. Weight obtained and Labs drawn this AM. Results reviewed per Nhung Fernandez CNP ,Ok to proceed with Tx as ordered today. Pt took Metolazone dose this morning.  Lasix 120mg given IVP over 9 minutes followed by Lasix gtt started at 20mg/hr for 4 hours. Pt scheduled to return next Wednesday for same treatment. Pt discharged home at this time per W/C.   Sig Dispense Refill   • ciclopirox olamine (LOPROX) 0.77 % cream Apply topically 2 times daily. Until resolution of rash 30 g 3   • EPINEPHrine 0.3 MG/0.3ML auto-injector Inject 0.3 mLs into the muscle 1 time as needed for Anaphylaxis. 1 each 1     No current facility-administered medications for this visit.       ALLERGIES:   Allergen Reactions   • Epa ANAPHYLAXIS     Tilapia caused throat closing   • Fish   (Food Or Med) PRURITUS       Visit Vitals  /66   Pulse 100   Ht 5' 6\" (1.676 m)   Wt 81.6 kg (180 lb)   BMI 29.05 kg/m²       MSK:  Positive carpal tunnel compression and negative Tinel sign on the right     Right Elbow  ROM  Flexion is 130 degrees  Extension is 0 degrees    Wrist ROM  Wrist motion is full     Palpation Exam  Tenderness over the lateral epicondyle and common extensor tendon, otherwise no tenderness or swelling appreciated.     Elbow  Cubital tunnel bend test is negative.  Cubital Tinel's (Percussion) test is negative.  Resisted third finger extension is positive for pain.  Resisted wrist extension with elbow fully extended is positive for pain.  Resisted pronation and wrist flexion is negative for pain.     Strength  Elbow flexion strength is  5/5.  Elbow extension strength is 5/5.  Wrist flexion strength is 5/5.  Wrist extension strength is 5/5.    Neuro  Motor function AIN, PIN and ulnar nerve is intact     Vascular Exam  Radial artery pulse +2     Left Elbow  ROM  Flexion 130 degrees  Extension 0 degrees     Strength  Elbow flexion strength is  5/5.  Elbow extension strength is 5/5.  Wrist flexion strength is 5/5.  Wrist extension strength is 5/5.     Vascular Exam  Radial artery pulse +2     IMAGING &TEST RESULTS:  No new imaging obtained today    ASSESSMENT & PLAN:  Avtar Blank is a 38 year old right-hand-dominant man who works as a  and who presents today for re-evaluation of insidious onset of right lateral elbow pain secondary to lateral epicondylosis for which  he previously completed a course of meloxicam prescribed by his primary care and has been using a counterforce brace for with some improvement but not resolution in his lateral elbow pain.  Patient also reports intermittent right hand fingers numbness and tingling present since at least 2022 for which the recommend that he completes an EMG study which he has not scheduled as of today.  Patient returns today noting mild improvement in his symptoms since his last office visit with pleating 3 sessions of physical therapy, performing home exercises, daytime use of counterforce brace and icing     Diagnosis: Right elbow lateral epicondylosis. Right hand numbness and tingling.  Treatment: Lateral epicondylosis-continued symptoms recommend continuation with home exercises, icing, return to nighttime volar wrist brace use and continuation with daytime counterforce brace use.  Recommend performing home exercises provided for him in therapy as well as demonstrated for him in the office today.  Recommend adding nitro patches.  Patient was instructed to cut each patch into 4 pieces and then apply one fourth of the patch to the most painful aspect of his lateral elbow for 10 hours a day every day until pain subsides.  Again discussed possible side effects using the nitro patches including skin irritation, lightheadedness or transient headaches for which he can discontinue medication.  Recommend following up on the EMG study.  Follow-up with us in the office in 4 weeks time.  Patient voiced understanding and was in agreement with the plan.      Total time was 30 minutes. That includes chart review before the visit, the actual patient visit, and time spent on documentation after the visit.    Ancillary staff notes were reviewed and accepted.    Electronically signed by Jeffrey oYder MD 2/20/2023     Note to patient: The 21st Century Cures Act makes medical notes like these available to patients in the interest of  transparency. However, be advised this is a medical document. It is intended as peer to peer communication. It is written in medical language and may contain abbreviations or verbiage that are unfamiliar. It may appear blunt or direct. Medical documents are intended to carry relevant information, facts as evident, and the clinical opinion of the practitioner

## 2024-09-12 ENCOUNTER — HOSPITAL ENCOUNTER (OUTPATIENT)
Dept: ONCOLOGY | Age: 54
Setting detail: INFUSION SERIES
Discharge: HOME | End: 2024-09-12
Payer: COMMERCIAL

## 2024-09-12 VITALS
TEMPERATURE: 97.8 F | WEIGHT: 210.6 LBS | HEART RATE: 76 BPM | DIASTOLIC BLOOD PRESSURE: 68 MMHG | RESPIRATION RATE: 16 BRPM | SYSTOLIC BLOOD PRESSURE: 133 MMHG | BODY MASS INDEX: 36.15 KG/M2

## 2024-09-12 DIAGNOSIS — R06.02 SHORTNESS OF BREATH: ICD-10-CM

## 2024-09-12 DIAGNOSIS — I50.32 CHRONIC HEART FAILURE WITH PRESERVED EJECTION FRACTION (HCC): ICD-10-CM

## 2024-09-12 DIAGNOSIS — I50.9 ACUTE ON CHRONIC CONGESTIVE HEART FAILURE, UNSPECIFIED HEART FAILURE TYPE (HCC): Primary | ICD-10-CM

## 2024-09-12 LAB
ANION GAP SERPL CALCULATED.3IONS-SCNC: 14 MMOL/L (ref 3–16)
BUN SERPL-MCNC: 41 MG/DL (ref 7–20)
CALCIUM SERPL-MCNC: 9.1 MG/DL (ref 8.3–10.6)
CHLORIDE SERPL-SCNC: 96 MMOL/L (ref 99–110)
CO2 SERPL-SCNC: 28 MMOL/L (ref 21–32)
CREAT SERPL-MCNC: 1.2 MG/DL (ref 0.6–1.1)
GFR SERPLBLD CREATININE-BSD FMLA CKD-EPI: 53 ML/MIN/{1.73_M2}
GLUCOSE SERPL-MCNC: 93 MG/DL (ref 70–99)
NT-PROBNP SERPL-MCNC: 513 PG/ML (ref 0–124)
POTASSIUM SERPL-SCNC: 4 MMOL/L (ref 3.5–5.1)
SODIUM SERPL-SCNC: 138 MMOL/L (ref 136–145)

## 2024-09-12 PROCEDURE — 80048 BASIC METABOLIC PNL TOTAL CA: CPT

## 2024-09-12 PROCEDURE — 96375 TX/PRO/DX INJ NEW DRUG ADDON: CPT

## 2024-09-12 PROCEDURE — 83880 ASSAY OF NATRIURETIC PEPTIDE: CPT

## 2024-09-12 PROCEDURE — 99211 OFF/OP EST MAY X REQ PHY/QHP: CPT

## 2024-09-12 PROCEDURE — 96365 THER/PROPH/DIAG IV INF INIT: CPT

## 2024-09-12 PROCEDURE — 6360000002 HC RX W HCPCS: Performed by: INTERNAL MEDICINE

## 2024-09-12 PROCEDURE — 2580000003 HC RX 258: Performed by: INTERNAL MEDICINE

## 2024-09-12 PROCEDURE — 96366 THER/PROPH/DIAG IV INF ADDON: CPT

## 2024-09-12 RX ORDER — FUROSEMIDE 10 MG/ML
120 INJECTION INTRAMUSCULAR; INTRAVENOUS ONCE
Status: CANCELLED
Start: 2024-09-19 | End: 2024-09-19

## 2024-09-12 RX ORDER — SODIUM CHLORIDE 0.9 % (FLUSH) 0.9 %
5-40 SYRINGE (ML) INJECTION ONCE
Status: CANCELLED
Start: 2024-09-19 | End: 2024-09-19

## 2024-09-12 RX ORDER — FUROSEMIDE 10 MG/ML
120 INJECTION INTRAMUSCULAR; INTRAVENOUS ONCE
Status: COMPLETED | OUTPATIENT
Start: 2024-09-12 | End: 2024-09-12

## 2024-09-12 RX ORDER — SODIUM CHLORIDE 0.9 % (FLUSH) 0.9 %
5-40 SYRINGE (ML) INJECTION ONCE
Status: COMPLETED | OUTPATIENT
Start: 2024-09-12 | End: 2024-09-12

## 2024-09-12 RX ADMIN — SODIUM CHLORIDE, PRESERVATIVE FREE 10 ML: 5 INJECTION INTRAVENOUS at 11:04

## 2024-09-12 RX ADMIN — FUROSEMIDE 120 MG: 10 INJECTION, SOLUTION INTRAMUSCULAR; INTRAVENOUS at 11:05

## 2024-09-12 RX ADMIN — FUROSEMIDE 20 MG/HR: 10 INJECTION, SOLUTION INTRAMUSCULAR; INTRAVENOUS at 11:14

## 2024-09-19 ENCOUNTER — HOSPITAL ENCOUNTER (OUTPATIENT)
Dept: ONCOLOGY | Age: 54
Setting detail: INFUSION SERIES
Discharge: HOME | End: 2024-09-19
Payer: COMMERCIAL

## 2024-09-19 ENCOUNTER — TELEPHONE (OUTPATIENT)
Dept: ENDOCRINOLOGY | Age: 54
End: 2024-09-19

## 2024-09-19 VITALS
BODY MASS INDEX: 36.01 KG/M2 | TEMPERATURE: 97.6 F | RESPIRATION RATE: 16 BRPM | DIASTOLIC BLOOD PRESSURE: 65 MMHG | HEART RATE: 72 BPM | WEIGHT: 209.8 LBS | SYSTOLIC BLOOD PRESSURE: 110 MMHG

## 2024-09-19 DIAGNOSIS — I50.32 CHRONIC HEART FAILURE WITH PRESERVED EJECTION FRACTION (HCC): ICD-10-CM

## 2024-09-19 DIAGNOSIS — R06.02 SHORTNESS OF BREATH: ICD-10-CM

## 2024-09-19 DIAGNOSIS — I50.9 ACUTE ON CHRONIC CONGESTIVE HEART FAILURE, UNSPECIFIED HEART FAILURE TYPE (HCC): Primary | ICD-10-CM

## 2024-09-19 LAB
ANION GAP SERPL CALCULATED.3IONS-SCNC: 14 MMOL/L (ref 3–16)
BUN SERPL-MCNC: 27 MG/DL (ref 7–20)
CALCIUM SERPL-MCNC: 9.3 MG/DL (ref 8.3–10.6)
CHLORIDE SERPL-SCNC: 95 MMOL/L (ref 99–110)
CO2 SERPL-SCNC: 30 MMOL/L (ref 21–32)
CREAT SERPL-MCNC: 1.1 MG/DL (ref 0.6–1.1)
GFR SERPLBLD CREATININE-BSD FMLA CKD-EPI: 59 ML/MIN/{1.73_M2}
GLUCOSE SERPL-MCNC: 141 MG/DL (ref 70–99)
NT-PROBNP SERPL-MCNC: 364 PG/ML (ref 0–124)
POTASSIUM SERPL-SCNC: 4.1 MMOL/L (ref 3.5–5.1)
SODIUM SERPL-SCNC: 139 MMOL/L (ref 136–145)

## 2024-09-19 PROCEDURE — 2580000003 HC RX 258: Performed by: INTERNAL MEDICINE

## 2024-09-19 PROCEDURE — 80048 BASIC METABOLIC PNL TOTAL CA: CPT

## 2024-09-19 PROCEDURE — 99211 OFF/OP EST MAY X REQ PHY/QHP: CPT

## 2024-09-19 PROCEDURE — 6360000002 HC RX W HCPCS: Performed by: INTERNAL MEDICINE

## 2024-09-19 PROCEDURE — 83880 ASSAY OF NATRIURETIC PEPTIDE: CPT

## 2024-09-19 PROCEDURE — 96366 THER/PROPH/DIAG IV INF ADDON: CPT

## 2024-09-19 PROCEDURE — 96375 TX/PRO/DX INJ NEW DRUG ADDON: CPT

## 2024-09-19 PROCEDURE — 96365 THER/PROPH/DIAG IV INF INIT: CPT

## 2024-09-19 RX ORDER — FUROSEMIDE 10 MG/ML
120 INJECTION INTRAMUSCULAR; INTRAVENOUS ONCE
Status: COMPLETED | OUTPATIENT
Start: 2024-09-19 | End: 2024-09-19

## 2024-09-19 RX ORDER — SODIUM CHLORIDE 0.9 % (FLUSH) 0.9 %
5-40 SYRINGE (ML) INJECTION ONCE
Status: DISCONTINUED | OUTPATIENT
Start: 2024-09-19 | End: 2024-09-20 | Stop reason: HOSPADM

## 2024-09-19 RX ORDER — SODIUM CHLORIDE 0.9 % (FLUSH) 0.9 %
5-40 SYRINGE (ML) INJECTION ONCE
Status: CANCELLED
Start: 2024-09-26 | End: 2024-09-26

## 2024-09-19 RX ORDER — FUROSEMIDE 10 MG/ML
120 INJECTION INTRAMUSCULAR; INTRAVENOUS ONCE
Status: CANCELLED
Start: 2024-09-26 | End: 2024-09-26

## 2024-09-19 RX ADMIN — FUROSEMIDE 120 MG: 10 INJECTION, SOLUTION INTRAMUSCULAR; INTRAVENOUS at 10:29

## 2024-09-19 RX ADMIN — FUROSEMIDE 20 MG/HR: 10 INJECTION, SOLUTION INTRAMUSCULAR; INTRAVENOUS at 10:37

## 2024-09-25 ENCOUNTER — TELEPHONE (OUTPATIENT)
Dept: INTERNAL MEDICINE CLINIC | Age: 54
End: 2024-09-25

## 2024-09-25 DIAGNOSIS — N17.9 AKI (ACUTE KIDNEY INJURY) (HCC): ICD-10-CM

## 2024-09-25 DIAGNOSIS — I50.32 CHRONIC DIASTOLIC HEART FAILURE (HCC): Primary | ICD-10-CM

## 2024-09-25 DIAGNOSIS — I10 ESSENTIAL HYPERTENSION: Chronic | ICD-10-CM

## 2024-09-25 DIAGNOSIS — I50.32 CHRONIC DIASTOLIC HEART FAILURE (HCC): ICD-10-CM

## 2024-09-25 DIAGNOSIS — Z09 HOSPITAL DISCHARGE FOLLOW-UP: ICD-10-CM

## 2024-09-25 DIAGNOSIS — E11.42 DIABETIC POLYNEUROPATHY ASSOCIATED WITH TYPE 2 DIABETES MELLITUS (HCC): ICD-10-CM

## 2024-09-25 DIAGNOSIS — M51.36 DDD (DEGENERATIVE DISC DISEASE), LUMBAR: ICD-10-CM

## 2024-09-25 DIAGNOSIS — M50.30 DDD (DEGENERATIVE DISC DISEASE), CERVICAL: ICD-10-CM

## 2024-09-25 DIAGNOSIS — K44.9 HIATAL HERNIA WITH GERD: ICD-10-CM

## 2024-09-25 DIAGNOSIS — I50.32 CHRONIC HEART FAILURE WITH PRESERVED EJECTION FRACTION (HCC): Chronic | ICD-10-CM

## 2024-09-25 DIAGNOSIS — K21.9 HIATAL HERNIA WITH GERD: ICD-10-CM

## 2024-09-26 ENCOUNTER — TELEPHONE (OUTPATIENT)
Dept: CARDIOLOGY CLINIC | Age: 54
End: 2024-09-26

## 2024-09-26 ENCOUNTER — HOSPITAL ENCOUNTER (OUTPATIENT)
Dept: ONCOLOGY | Age: 54
Setting detail: INFUSION SERIES
Discharge: HOME | End: 2024-09-26
Payer: COMMERCIAL

## 2024-09-26 VITALS
TEMPERATURE: 97 F | SYSTOLIC BLOOD PRESSURE: 112 MMHG | RESPIRATION RATE: 16 BRPM | HEART RATE: 71 BPM | DIASTOLIC BLOOD PRESSURE: 55 MMHG

## 2024-09-26 DIAGNOSIS — I50.32 CHRONIC HEART FAILURE WITH PRESERVED EJECTION FRACTION (HCC): ICD-10-CM

## 2024-09-26 DIAGNOSIS — I50.9 ACUTE ON CHRONIC CONGESTIVE HEART FAILURE, UNSPECIFIED HEART FAILURE TYPE (HCC): Primary | ICD-10-CM

## 2024-09-26 DIAGNOSIS — R06.02 SHORTNESS OF BREATH: ICD-10-CM

## 2024-09-26 LAB
ANION GAP SERPL CALCULATED.3IONS-SCNC: 12 MMOL/L (ref 3–16)
BUN SERPL-MCNC: 43 MG/DL (ref 7–20)
CALCIUM SERPL-MCNC: 9.2 MG/DL (ref 8.3–10.6)
CHLORIDE SERPL-SCNC: 100 MMOL/L (ref 99–110)
CO2 SERPL-SCNC: 31 MMOL/L (ref 21–32)
CREAT SERPL-MCNC: 1.3 MG/DL (ref 0.6–1.1)
GFR SERPLBLD CREATININE-BSD FMLA CKD-EPI: 48 ML/MIN/{1.73_M2}
GLUCOSE SERPL-MCNC: 60 MG/DL (ref 70–99)
NT-PROBNP SERPL-MCNC: 172 PG/ML (ref 0–124)
POTASSIUM SERPL-SCNC: 3.2 MMOL/L (ref 3.5–5.1)
REASON FOR REJECTION: NORMAL
REJECTED TEST: NORMAL
SODIUM SERPL-SCNC: 143 MMOL/L (ref 136–145)

## 2024-09-26 PROCEDURE — 36415 COLL VENOUS BLD VENIPUNCTURE: CPT

## 2024-09-26 PROCEDURE — 99211 OFF/OP EST MAY X REQ PHY/QHP: CPT

## 2024-09-26 PROCEDURE — 83880 ASSAY OF NATRIURETIC PEPTIDE: CPT

## 2024-09-26 PROCEDURE — 6370000000 HC RX 637 (ALT 250 FOR IP): Performed by: CLINICAL NURSE SPECIALIST

## 2024-09-26 PROCEDURE — 2580000003 HC RX 258: Performed by: INTERNAL MEDICINE

## 2024-09-26 PROCEDURE — 80048 BASIC METABOLIC PNL TOTAL CA: CPT

## 2024-09-26 RX ORDER — POTASSIUM CHLORIDE 1500 MG/1
20 TABLET, EXTENDED RELEASE ORAL 2 TIMES DAILY WITH MEALS
Status: DISCONTINUED | OUTPATIENT
Start: 2024-09-26 | End: 2024-09-27 | Stop reason: HOSPADM

## 2024-09-26 RX ORDER — SODIUM CHLORIDE 0.9 % (FLUSH) 0.9 %
5-40 SYRINGE (ML) INJECTION ONCE
Start: 2024-10-03 | End: 2024-10-03

## 2024-09-26 RX ORDER — SODIUM CHLORIDE 0.9 % (FLUSH) 0.9 %
5-40 SYRINGE (ML) INJECTION ONCE
Status: COMPLETED | OUTPATIENT
Start: 2024-09-26 | End: 2024-09-26

## 2024-09-26 RX ORDER — SPIRONOLACTONE 50 MG/1
50 TABLET, FILM COATED ORAL DAILY
Qty: 90 TABLET | Refills: 0 | Status: SHIPPED | OUTPATIENT
Start: 2024-09-26

## 2024-09-26 RX ORDER — FUROSEMIDE 10 MG/ML
120 INJECTION INTRAMUSCULAR; INTRAVENOUS ONCE
Start: 2024-10-03 | End: 2024-10-03

## 2024-09-26 RX ORDER — FUROSEMIDE 10 MG/ML
120 INJECTION INTRAMUSCULAR; INTRAVENOUS ONCE
Status: DISCONTINUED | OUTPATIENT
Start: 2024-09-26 | End: 2024-09-27 | Stop reason: HOSPADM

## 2024-09-26 RX ADMIN — Medication 10 ML: at 09:45

## 2024-09-26 RX ADMIN — POTASSIUM CHLORIDE 20 MEQ: 1500 TABLET, EXTENDED RELEASE ORAL at 11:24

## 2024-09-26 NOTE — PROGRESS NOTES
Pt to Dept per wheel chair with O2 at 2L/NC for  Lasix IVP and Lasix gtt as ordered from Dr. Parsons. Pt denies SOB. Weight obtained and Labs drawn this AM. Results reviewed per Mer Hill CNP. Orders noted per Dr. Bond to hold Lasix treatment today. K+ level is 3.2. Orders noted to give 20 meq Potassium Chloride prior to discharge and the 2nd tablet 20 meq Potassium Chloride to take at home at dinner tonight. Pt scheduled to return next Thursday for same treatment.

## 2024-09-26 NOTE — PROGRESS NOTES
First tablet 20 meq K+ given to pt. Pt also, expressed that she feels a little shaky. Skin is warm and dry. Pt denies lightheadedness nor dizziness. 240 cc orange given with 2 tablespoons peanut butter.After a few minutes, pt stated that she felt better. Shakiness subsided. Comprehension noted that patient is to take 2nd dose K+ 20meq at dinner tonight. Pt is stable and expressed that she is ready for discharged. Discharged to car per wheelchair, accompanied by .

## 2024-10-03 ENCOUNTER — HOSPITAL ENCOUNTER (OUTPATIENT)
Dept: ONCOLOGY | Age: 54
Setting detail: INFUSION SERIES
Discharge: HOME | End: 2024-10-03
Payer: COMMERCIAL

## 2024-10-03 ENCOUNTER — TELEPHONE (OUTPATIENT)
Dept: CARDIOLOGY CLINIC | Age: 54
End: 2024-10-03

## 2024-10-03 VITALS
RESPIRATION RATE: 18 BRPM | WEIGHT: 198.1 LBS | HEART RATE: 65 BPM | SYSTOLIC BLOOD PRESSURE: 115 MMHG | TEMPERATURE: 97.2 F | BODY MASS INDEX: 34 KG/M2 | DIASTOLIC BLOOD PRESSURE: 76 MMHG

## 2024-10-03 DIAGNOSIS — I50.9 ACUTE ON CHRONIC CONGESTIVE HEART FAILURE, UNSPECIFIED HEART FAILURE TYPE (HCC): Primary | ICD-10-CM

## 2024-10-03 DIAGNOSIS — R06.02 SHORTNESS OF BREATH: ICD-10-CM

## 2024-10-03 DIAGNOSIS — I50.32 CHRONIC HEART FAILURE WITH PRESERVED EJECTION FRACTION (HCC): ICD-10-CM

## 2024-10-03 LAB
ANION GAP SERPL CALCULATED.3IONS-SCNC: 9 MMOL/L (ref 3–16)
BUN SERPL-MCNC: 32 MG/DL (ref 7–20)
CALCIUM SERPL-MCNC: 9.1 MG/DL (ref 8.3–10.6)
CHLORIDE SERPL-SCNC: 93 MMOL/L (ref 99–110)
CO2 SERPL-SCNC: 34 MMOL/L (ref 21–32)
CREAT SERPL-MCNC: 1.3 MG/DL (ref 0.6–1.1)
GFR SERPLBLD CREATININE-BSD FMLA CKD-EPI: 48 ML/MIN/{1.73_M2}
GLUCOSE SERPL-MCNC: 321 MG/DL (ref 70–99)
NT-PROBNP SERPL-MCNC: 272 PG/ML (ref 0–124)
POTASSIUM SERPL-SCNC: 4.2 MMOL/L (ref 3.5–5.1)
SODIUM SERPL-SCNC: 136 MMOL/L (ref 136–145)

## 2024-10-03 PROCEDURE — 96365 THER/PROPH/DIAG IV INF INIT: CPT

## 2024-10-03 PROCEDURE — 99211 OFF/OP EST MAY X REQ PHY/QHP: CPT

## 2024-10-03 PROCEDURE — 80048 BASIC METABOLIC PNL TOTAL CA: CPT

## 2024-10-03 PROCEDURE — 96375 TX/PRO/DX INJ NEW DRUG ADDON: CPT

## 2024-10-03 PROCEDURE — 6360000002 HC RX W HCPCS: Performed by: INTERNAL MEDICINE

## 2024-10-03 PROCEDURE — 96366 THER/PROPH/DIAG IV INF ADDON: CPT

## 2024-10-03 PROCEDURE — 83880 ASSAY OF NATRIURETIC PEPTIDE: CPT

## 2024-10-03 PROCEDURE — 36415 COLL VENOUS BLD VENIPUNCTURE: CPT

## 2024-10-03 PROCEDURE — 2580000003 HC RX 258: Performed by: INTERNAL MEDICINE

## 2024-10-03 RX ORDER — SODIUM CHLORIDE 0.9 % (FLUSH) 0.9 %
5-40 SYRINGE (ML) INJECTION ONCE
Status: CANCELLED
Start: 2024-10-10 | End: 2024-10-10

## 2024-10-03 RX ORDER — SODIUM CHLORIDE 0.9 % (FLUSH) 0.9 %
5-40 SYRINGE (ML) INJECTION ONCE
Status: COMPLETED | OUTPATIENT
Start: 2024-10-03 | End: 2024-10-03

## 2024-10-03 RX ORDER — FUROSEMIDE 10 MG/ML
120 INJECTION INTRAMUSCULAR; INTRAVENOUS ONCE
Status: COMPLETED | OUTPATIENT
Start: 2024-10-03 | End: 2024-10-03

## 2024-10-03 RX ORDER — FUROSEMIDE 10 MG/ML
120 INJECTION INTRAMUSCULAR; INTRAVENOUS ONCE
Status: CANCELLED
Start: 2024-10-10 | End: 2024-10-10

## 2024-10-03 RX ORDER — METOLAZONE 5 MG/1
5 TABLET ORAL WEEKLY
Qty: 16 TABLET | Refills: 1 | Status: SHIPPED | OUTPATIENT
Start: 2024-10-03

## 2024-10-03 RX ADMIN — SODIUM CHLORIDE, PRESERVATIVE FREE 10 ML: 5 INJECTION INTRAVENOUS at 09:31

## 2024-10-03 RX ADMIN — FUROSEMIDE 20 MG/HR: 10 INJECTION, SOLUTION INTRAMUSCULAR; INTRAVENOUS at 11:15

## 2024-10-03 RX ADMIN — FUROSEMIDE 120 MG: 10 INJECTION, SOLUTION INTRAMUSCULAR; INTRAVENOUS at 11:07

## 2024-10-03 NOTE — PROGRESS NOTES
Pt to Dept for Lasix IVP and Lasix gtt as ordered from Dr. Parsons. Weight obtained and Labs drawn this AM. Results reviewed per Dr. Parsons ,Ok to proceed with Tx as ordered today. Pt took Metolazone dose this morning.  Lasix 120mg given IVP over 8 minutes followed by Lasix gtt started at 20mg/hr for 4 hours. Pt scheduled to return next Thursday for same treatment. Pt discharged home at this time per W/C.

## 2024-10-03 NOTE — TELEPHONE ENCOUNTER
Received refill request for  JARDIANCE 10 MG   from [facility] pharmacy.     Last OV: 8/7/2024 NPKV    Next OV: 11/18/2024 ARETHA    Last Labs: 9/26/2024 BMP    Last Filled: 4/4/2024 NPKV

## 2024-10-04 RX ORDER — EMPAGLIFLOZIN 10 MG/1
10 TABLET, FILM COATED ORAL DAILY
Qty: 90 TABLET | Refills: 0 | Status: SHIPPED | OUTPATIENT
Start: 2024-10-04

## 2024-10-10 ENCOUNTER — HOSPITAL ENCOUNTER (OUTPATIENT)
Dept: ONCOLOGY | Age: 54
Setting detail: INFUSION SERIES
Discharge: HOME | End: 2024-10-10
Payer: COMMERCIAL

## 2024-10-10 VITALS
DIASTOLIC BLOOD PRESSURE: 70 MMHG | RESPIRATION RATE: 20 BRPM | TEMPERATURE: 97 F | HEART RATE: 77 BPM | BODY MASS INDEX: 35.93 KG/M2 | SYSTOLIC BLOOD PRESSURE: 139 MMHG | WEIGHT: 209.3 LBS

## 2024-10-10 DIAGNOSIS — I50.9 ACUTE ON CHRONIC CONGESTIVE HEART FAILURE, UNSPECIFIED HEART FAILURE TYPE (HCC): Primary | ICD-10-CM

## 2024-10-10 DIAGNOSIS — I50.32 CHRONIC HEART FAILURE WITH PRESERVED EJECTION FRACTION (HCC): ICD-10-CM

## 2024-10-10 DIAGNOSIS — R06.02 SHORTNESS OF BREATH: ICD-10-CM

## 2024-10-10 LAB
ANION GAP SERPL CALCULATED.3IONS-SCNC: 12 MMOL/L (ref 3–16)
BUN SERPL-MCNC: 43 MG/DL (ref 7–20)
CALCIUM SERPL-MCNC: 9.5 MG/DL (ref 8.3–10.6)
CHLORIDE SERPL-SCNC: 89 MMOL/L (ref 99–110)
CO2 SERPL-SCNC: 31 MMOL/L (ref 21–32)
CREAT SERPL-MCNC: 1.5 MG/DL (ref 0.6–1.1)
GFR SERPLBLD CREATININE-BSD FMLA CKD-EPI: 41 ML/MIN/{1.73_M2}
GLUCOSE SERPL-MCNC: 577 MG/DL (ref 70–99)
NT-PROBNP SERPL-MCNC: 382 PG/ML (ref 0–124)
POTASSIUM SERPL-SCNC: 4.1 MMOL/L (ref 3.5–5.1)
SODIUM SERPL-SCNC: 132 MMOL/L (ref 136–145)

## 2024-10-10 PROCEDURE — 96365 THER/PROPH/DIAG IV INF INIT: CPT

## 2024-10-10 PROCEDURE — 99211 OFF/OP EST MAY X REQ PHY/QHP: CPT

## 2024-10-10 PROCEDURE — 6360000002 HC RX W HCPCS: Performed by: INTERNAL MEDICINE

## 2024-10-10 PROCEDURE — 96366 THER/PROPH/DIAG IV INF ADDON: CPT

## 2024-10-10 PROCEDURE — 96375 TX/PRO/DX INJ NEW DRUG ADDON: CPT

## 2024-10-10 PROCEDURE — 80048 BASIC METABOLIC PNL TOTAL CA: CPT

## 2024-10-10 PROCEDURE — 2580000003 HC RX 258: Performed by: INTERNAL MEDICINE

## 2024-10-10 PROCEDURE — 83880 ASSAY OF NATRIURETIC PEPTIDE: CPT

## 2024-10-10 RX ORDER — SODIUM CHLORIDE 0.9 % (FLUSH) 0.9 %
5-40 SYRINGE (ML) INJECTION ONCE
Status: CANCELLED
Start: 2024-10-17 | End: 2024-10-17

## 2024-10-10 RX ORDER — FUROSEMIDE 10 MG/ML
120 INJECTION INTRAMUSCULAR; INTRAVENOUS ONCE
Status: CANCELLED
Start: 2024-10-17 | End: 2024-10-17

## 2024-10-10 RX ORDER — FUROSEMIDE 10 MG/ML
120 INJECTION INTRAMUSCULAR; INTRAVENOUS ONCE
Status: COMPLETED | OUTPATIENT
Start: 2024-10-10 | End: 2024-10-10

## 2024-10-10 RX ORDER — SODIUM CHLORIDE 0.9 % (FLUSH) 0.9 %
5-40 SYRINGE (ML) INJECTION ONCE
Status: COMPLETED | OUTPATIENT
Start: 2024-10-10 | End: 2024-10-10

## 2024-10-10 RX ADMIN — FUROSEMIDE 120 MG: 10 INJECTION, SOLUTION INTRAMUSCULAR; INTRAVENOUS at 10:11

## 2024-10-10 RX ADMIN — SODIUM CHLORIDE, PRESERVATIVE FREE 10 ML: 5 INJECTION INTRAVENOUS at 10:23

## 2024-10-10 RX ADMIN — FUROSEMIDE 20 MG/HR: 10 INJECTION, SOLUTION INTRAMUSCULAR; INTRAVENOUS at 10:22

## 2024-10-10 NOTE — PROGRESS NOTES
Pt to Dept for Lasix IVP and Lasix gtt as ordered from Nhung Fernandez NP. Weight obtained and Labs drawn this AM. Results reviewed per Nhung Fernandez NP ,Ok to proceed with Tx as ordered today. Pt took Metolazone dose this morning.  Lasix 120mg given IVP over 10 minutes followed by Lasix gtt started at 20mg/hr for 4 hours. Pt scheduled to return next Thursday for same treatment. Pt discharged home at this time per W/C.

## 2024-10-16 ENCOUNTER — OFFICE VISIT (OUTPATIENT)
Dept: PULMONOLOGY | Age: 54
End: 2024-10-16
Payer: COMMERCIAL

## 2024-10-16 VITALS
DIASTOLIC BLOOD PRESSURE: 78 MMHG | OXYGEN SATURATION: 96 % | WEIGHT: 207.8 LBS | BODY MASS INDEX: 35.48 KG/M2 | HEART RATE: 65 BPM | SYSTOLIC BLOOD PRESSURE: 118 MMHG | HEIGHT: 64 IN

## 2024-10-16 DIAGNOSIS — G47.33 OSA (OBSTRUCTIVE SLEEP APNEA): Primary | ICD-10-CM

## 2024-10-16 DIAGNOSIS — J96.11 CHRONIC RESPIRATORY FAILURE WITH HYPOXIA: ICD-10-CM

## 2024-10-16 DIAGNOSIS — J45.30 MILD PERSISTENT ASTHMA WITHOUT COMPLICATION: ICD-10-CM

## 2024-10-16 PROCEDURE — G8484 FLU IMMUNIZE NO ADMIN: HCPCS | Performed by: INTERNAL MEDICINE

## 2024-10-16 PROCEDURE — G8417 CALC BMI ABV UP PARAM F/U: HCPCS | Performed by: INTERNAL MEDICINE

## 2024-10-16 PROCEDURE — 3078F DIAST BP <80 MM HG: CPT | Performed by: INTERNAL MEDICINE

## 2024-10-16 PROCEDURE — 3074F SYST BP LT 130 MM HG: CPT | Performed by: INTERNAL MEDICINE

## 2024-10-16 PROCEDURE — 3017F COLORECTAL CA SCREEN DOC REV: CPT | Performed by: INTERNAL MEDICINE

## 2024-10-16 PROCEDURE — G8427 DOCREV CUR MEDS BY ELIG CLIN: HCPCS | Performed by: INTERNAL MEDICINE

## 2024-10-16 PROCEDURE — 99214 OFFICE O/P EST MOD 30 MIN: CPT | Performed by: INTERNAL MEDICINE

## 2024-10-16 PROCEDURE — 1036F TOBACCO NON-USER: CPT | Performed by: INTERNAL MEDICINE

## 2024-10-16 NOTE — PROGRESS NOTES
mouth daily) 30 tablet 5    OXYGEN Inhale 2 L into the lungs continuous Sometimes with activity      oxyCODONE (OXYCONTIN) 20 MG extended release tablet Take 1 tablet by mouth in the morning and 1 tablet in the evening.      aspirin 81 MG EC tablet Take 1 tablet by mouth daily      benztropine (COGENTIN) 2 MG tablet Take 1 tablet by mouth nightly      duloxetine (CYMBALTA) 60 MG capsule Take 1 capsule by mouth 2 times daily       No current facility-administered medications on file prior to visit.               ALLERGIES:   Allergies as of 10/16/2024 - Fully Reviewed 10/16/2024   Allergen Reaction Noted    Iodides Anaphylaxis 04/20/2011    Other Anaphylaxis and Other (See Comments) 07/22/2021    Trazodone Shortness Of Breath and Other (See Comments) 07/22/2021      OBJECTIVE:   height is 1.626 m (5' 4\") and weight is 94.3 kg (207 lb 12.8 oz). Her blood pressure is 118/78 and her pulse is 65. Her oxygen saturation is 96%.       PHYSICAL EXAM:    CONSTITUTIONAL: She is a 54 y.o.-year-old who appears her stated age. She is alert and oriented x 3 and in no acute distress.   HEENT: No scleral icterus. Atraumatic, normocephalic.  NECK: Supple, without cervical or supraclavicular lymphadenopathy:  CARDIOVASCULAR: S1 S2 RRR. Without murmer  RESPIRATORY & CHEST: Lungs are clear to auscultation and percussion. No wheezing, no crackles. Good air movement  GASTROINTESTINAL & ABDOMEN: Soft, nontender, non-distended  GENITOURINARY: Deferred.   MUSCULOSKELETAL: There is no clubbing. No cyanosis. No edema of the lower extremities.   SKIN OF BODY: No rash or jaundice.   PSYCHIATRIC EVALUATION: Normal affect. Patient answers questions appropriately.   HEMATOLOGIC/LYMPHATIC/ IMMUNOLOGIC: No palpable lymphadenopathy.  NEUROLOGIC: Alert and oriented x 3.Groslly non-focal. Motor strength is 5+/5 in all muscle groups. The patient has a normal sensorium globally.        ASSESSMENT AND PLAN:     1. LUIS (obstructive sleep apnea)  Patient

## 2024-10-17 ENCOUNTER — HOSPITAL ENCOUNTER (OUTPATIENT)
Dept: ONCOLOGY | Age: 54
Setting detail: INFUSION SERIES
Discharge: HOME | End: 2024-10-17
Payer: COMMERCIAL

## 2024-10-17 VITALS
WEIGHT: 203.9 LBS | SYSTOLIC BLOOD PRESSURE: 104 MMHG | RESPIRATION RATE: 16 BRPM | DIASTOLIC BLOOD PRESSURE: 66 MMHG | BODY MASS INDEX: 35 KG/M2 | HEART RATE: 72 BPM | TEMPERATURE: 96.9 F

## 2024-10-17 DIAGNOSIS — R06.02 SHORTNESS OF BREATH: ICD-10-CM

## 2024-10-17 DIAGNOSIS — I50.9 ACUTE ON CHRONIC CONGESTIVE HEART FAILURE, UNSPECIFIED HEART FAILURE TYPE (HCC): Primary | ICD-10-CM

## 2024-10-17 DIAGNOSIS — I50.32 CHRONIC HEART FAILURE WITH PRESERVED EJECTION FRACTION (HCC): ICD-10-CM

## 2024-10-17 LAB
ANION GAP SERPL CALCULATED.3IONS-SCNC: 11 MMOL/L (ref 3–16)
BUN SERPL-MCNC: 20 MG/DL (ref 7–20)
CALCIUM SERPL-MCNC: 9.2 MG/DL (ref 8.3–10.6)
CHLORIDE SERPL-SCNC: 92 MMOL/L (ref 99–110)
CO2 SERPL-SCNC: 34 MMOL/L (ref 21–32)
CREAT SERPL-MCNC: 1.3 MG/DL (ref 0.6–1.1)
GFR SERPLBLD CREATININE-BSD FMLA CKD-EPI: 48 ML/MIN/{1.73_M2}
GLUCOSE SERPL-MCNC: 182 MG/DL (ref 70–99)
NT-PROBNP SERPL-MCNC: 200 PG/ML (ref 0–124)
POTASSIUM SERPL-SCNC: 4.2 MMOL/L (ref 3.5–5.1)
SODIUM SERPL-SCNC: 137 MMOL/L (ref 136–145)

## 2024-10-17 PROCEDURE — 96365 THER/PROPH/DIAG IV INF INIT: CPT

## 2024-10-17 PROCEDURE — 96366 THER/PROPH/DIAG IV INF ADDON: CPT

## 2024-10-17 PROCEDURE — 96375 TX/PRO/DX INJ NEW DRUG ADDON: CPT

## 2024-10-17 PROCEDURE — 2580000003 HC RX 258: Performed by: INTERNAL MEDICINE

## 2024-10-17 PROCEDURE — 6360000002 HC RX W HCPCS: Performed by: INTERNAL MEDICINE

## 2024-10-17 PROCEDURE — 83880 ASSAY OF NATRIURETIC PEPTIDE: CPT

## 2024-10-17 PROCEDURE — 99211 OFF/OP EST MAY X REQ PHY/QHP: CPT

## 2024-10-17 PROCEDURE — 80048 BASIC METABOLIC PNL TOTAL CA: CPT

## 2024-10-17 RX ORDER — SODIUM CHLORIDE 0.9 % (FLUSH) 0.9 %
5-40 SYRINGE (ML) INJECTION ONCE
Status: COMPLETED | OUTPATIENT
Start: 2024-10-17 | End: 2024-10-17

## 2024-10-17 RX ORDER — SODIUM CHLORIDE 0.9 % (FLUSH) 0.9 %
5-40 SYRINGE (ML) INJECTION ONCE
Status: CANCELLED
Start: 2024-10-24 | End: 2024-10-24

## 2024-10-17 RX ORDER — FUROSEMIDE 10 MG/ML
120 INJECTION INTRAMUSCULAR; INTRAVENOUS ONCE
Status: CANCELLED
Start: 2024-10-24 | End: 2024-10-24

## 2024-10-17 RX ORDER — FUROSEMIDE 10 MG/ML
120 INJECTION INTRAMUSCULAR; INTRAVENOUS ONCE
Status: COMPLETED | OUTPATIENT
Start: 2024-10-17 | End: 2024-10-17

## 2024-10-17 RX ADMIN — FUROSEMIDE 120 MG: 10 INJECTION, SOLUTION INTRAMUSCULAR; INTRAVENOUS at 10:33

## 2024-10-17 RX ADMIN — SODIUM CHLORIDE, PRESERVATIVE FREE 10 ML: 5 INJECTION INTRAVENOUS at 09:36

## 2024-10-17 RX ADMIN — FUROSEMIDE 20 MG/HR: 10 INJECTION, SOLUTION INTRAVENOUS at 10:40

## 2024-10-17 NOTE — PROGRESS NOTES
Pt to Dept for Lasix IVP and Lasix gtt as ordered from Nhung Fernandez NP. Weight obtained and Labs drawn this AM. Results reviewed per Nhung Fernandez NP ,Ok to proceed with Tx as ordered today. Pt took Metolazone dose this morning.  Lasix 120mg given IVP over 10 minutes followed by Lasix gtt started at 20mg/hr for 4 hours. Pt scheduled to return next Thursday for same treatment. Pt discharged home at this time per W/C.   Universal Protocol:  Procedure performed by: Lani Long  Consent: The procedure was performed in an emergent situation. Verbal consent obtained. Risks and benefits: risks, benefits and alternatives were discussed  Consent given by: patient  Patient identity confirmed: verbally with patient    Laceration repair    Date/Time: 9/22/2023 10:36 AM    Performed by: Hans Suarez MD  Authorized by: Hans Suarez MD  Body area: lower extremity (left BKA wound)  Contamination: The wound is contaminated.   Foreign bodies: no foreign bodies  Tendon involvement: none  Nerve involvement: none  Vascular damage: yes (small muscular bleeding controlled with figure of eight vicryl sutures)    Sedation:  Patient sedated: yes  Sedation type: moderate (conscious) sedation  Sedatives: midazolam and ketamine (2mg midazolom, 50mg ketamine, and 1mg of dilaudid)  Analgesia: hydromorphone      Wound Dehiscence:  Superficial Wound Dehiscence: simple closure with wound packing      Procedure Details:  Irrigation solution: saline  Irrigation method: syringe  Amount of cleaning: standard  Debridement: none  Degree of undermining: none  Skin closure: 3-0 nylon  Number of sutures: 6  Technique: horizontal mattress  Approximation: loose  Approximation difficulty: simple  Dressing: gauze packing, gauze roll and 4x4 sterile gauze  Patient tolerance: patient tolerated the procedure well with no immediate complications  Comments: Patient fell on his BKA stump and split open entirety of his incision, muscle intact with arterial muscular bleeding controlled with figure of eight sutures and skin loosely closed with nylon sutures and packed with betadine soaked packing strips

## 2024-10-24 ENCOUNTER — HOSPITAL ENCOUNTER (OUTPATIENT)
Dept: ONCOLOGY | Age: 54
Setting detail: INFUSION SERIES
Discharge: HOME | End: 2024-10-24
Payer: COMMERCIAL

## 2024-10-24 VITALS
WEIGHT: 211.3 LBS | DIASTOLIC BLOOD PRESSURE: 62 MMHG | HEART RATE: 72 BPM | BODY MASS INDEX: 36.27 KG/M2 | TEMPERATURE: 96.9 F | RESPIRATION RATE: 16 BRPM | SYSTOLIC BLOOD PRESSURE: 125 MMHG

## 2024-10-24 DIAGNOSIS — I50.9 ACUTE ON CHRONIC CONGESTIVE HEART FAILURE, UNSPECIFIED HEART FAILURE TYPE (HCC): Primary | ICD-10-CM

## 2024-10-24 DIAGNOSIS — R06.02 SHORTNESS OF BREATH: ICD-10-CM

## 2024-10-24 DIAGNOSIS — I50.32 CHRONIC HEART FAILURE WITH PRESERVED EJECTION FRACTION (HCC): ICD-10-CM

## 2024-10-24 LAB
ANION GAP SERPL CALCULATED.3IONS-SCNC: 11 MMOL/L (ref 3–16)
BUN SERPL-MCNC: 22 MG/DL (ref 7–20)
CALCIUM SERPL-MCNC: 8.8 MG/DL (ref 8.3–10.6)
CHLORIDE SERPL-SCNC: 97 MMOL/L (ref 99–110)
CO2 SERPL-SCNC: 30 MMOL/L (ref 21–32)
CREAT SERPL-MCNC: 1.3 MG/DL (ref 0.6–1.1)
GFR SERPLBLD CREATININE-BSD FMLA CKD-EPI: 48 ML/MIN/{1.73_M2}
GLUCOSE SERPL-MCNC: 250 MG/DL (ref 70–99)
NT-PROBNP SERPL-MCNC: 417 PG/ML (ref 0–124)
POTASSIUM SERPL-SCNC: 4.5 MMOL/L (ref 3.5–5.1)
SODIUM SERPL-SCNC: 138 MMOL/L (ref 136–145)

## 2024-10-24 PROCEDURE — 2580000003 HC RX 258: Performed by: INTERNAL MEDICINE

## 2024-10-24 PROCEDURE — 99211 OFF/OP EST MAY X REQ PHY/QHP: CPT

## 2024-10-24 PROCEDURE — 6360000002 HC RX W HCPCS: Performed by: INTERNAL MEDICINE

## 2024-10-24 PROCEDURE — 83880 ASSAY OF NATRIURETIC PEPTIDE: CPT

## 2024-10-24 PROCEDURE — 96372 THER/PROPH/DIAG INJ SC/IM: CPT

## 2024-10-24 PROCEDURE — 80048 BASIC METABOLIC PNL TOTAL CA: CPT

## 2024-10-24 PROCEDURE — 96366 THER/PROPH/DIAG IV INF ADDON: CPT

## 2024-10-24 PROCEDURE — 96365 THER/PROPH/DIAG IV INF INIT: CPT

## 2024-10-24 PROCEDURE — 96375 TX/PRO/DX INJ NEW DRUG ADDON: CPT

## 2024-10-24 RX ORDER — SODIUM CHLORIDE 0.9 % (FLUSH) 0.9 %
5-40 SYRINGE (ML) INJECTION ONCE
Status: COMPLETED | OUTPATIENT
Start: 2024-10-24 | End: 2024-10-24

## 2024-10-24 RX ORDER — SODIUM CHLORIDE 0.9 % (FLUSH) 0.9 %
5-40 SYRINGE (ML) INJECTION ONCE
Start: 2024-10-31 | End: 2024-10-31

## 2024-10-24 RX ORDER — FUROSEMIDE 10 MG/ML
120 INJECTION INTRAMUSCULAR; INTRAVENOUS ONCE
Start: 2024-10-31 | End: 2024-10-31

## 2024-10-24 RX ORDER — FUROSEMIDE 10 MG/ML
120 INJECTION INTRAMUSCULAR; INTRAVENOUS ONCE
Status: COMPLETED | OUTPATIENT
Start: 2024-10-24 | End: 2024-10-24

## 2024-10-24 RX ADMIN — FUROSEMIDE 120 MG: 10 INJECTION, SOLUTION INTRAMUSCULAR; INTRAVENOUS at 10:40

## 2024-10-24 RX ADMIN — FUROSEMIDE 20 MG/HR: 10 INJECTION, SOLUTION INTRAVENOUS at 10:48

## 2024-10-24 RX ADMIN — Medication 10 ML: at 10:43

## 2024-10-30 ENCOUNTER — HOSPITAL ENCOUNTER (OUTPATIENT)
Dept: ONCOLOGY | Age: 54
Setting detail: INFUSION SERIES
Discharge: HOME | End: 2024-10-30
Payer: COMMERCIAL

## 2024-10-30 VITALS
RESPIRATION RATE: 20 BRPM | SYSTOLIC BLOOD PRESSURE: 116 MMHG | TEMPERATURE: 97 F | DIASTOLIC BLOOD PRESSURE: 70 MMHG | HEART RATE: 69 BPM

## 2024-10-30 DIAGNOSIS — D50.9 IRON DEFICIENCY ANEMIA, UNSPECIFIED IRON DEFICIENCY ANEMIA TYPE: ICD-10-CM

## 2024-10-30 DIAGNOSIS — J45.30 MILD PERSISTENT ASTHMA WITHOUT COMPLICATION: ICD-10-CM

## 2024-10-30 DIAGNOSIS — I50.32 CHRONIC HEART FAILURE WITH PRESERVED EJECTION FRACTION (HCC): ICD-10-CM

## 2024-10-30 DIAGNOSIS — I50.9 ACUTE ON CHRONIC CONGESTIVE HEART FAILURE, UNSPECIFIED HEART FAILURE TYPE (HCC): Primary | ICD-10-CM

## 2024-10-30 DIAGNOSIS — R06.02 SHORTNESS OF BREATH: ICD-10-CM

## 2024-10-30 LAB
ANION GAP SERPL CALCULATED.3IONS-SCNC: 11 MMOL/L (ref 3–16)
BUN SERPL-MCNC: 31 MG/DL (ref 7–20)
CALCIUM SERPL-MCNC: 9.2 MG/DL (ref 8.3–10.6)
CHLORIDE SERPL-SCNC: 95 MMOL/L (ref 99–110)
CO2 SERPL-SCNC: 35 MMOL/L (ref 21–32)
CREAT SERPL-MCNC: 1.2 MG/DL (ref 0.6–1.1)
GFR SERPLBLD CREATININE-BSD FMLA CKD-EPI: 53 ML/MIN/{1.73_M2}
GLUCOSE SERPL-MCNC: 221 MG/DL (ref 70–99)
NT-PROBNP SERPL-MCNC: 219 PG/ML (ref 0–124)
POTASSIUM SERPL-SCNC: 3.7 MMOL/L (ref 3.5–5.1)
REASON FOR REJECTION: NORMAL
REJECTED TEST: NORMAL
SODIUM SERPL-SCNC: 141 MMOL/L (ref 136–145)

## 2024-10-30 PROCEDURE — 80048 BASIC METABOLIC PNL TOTAL CA: CPT

## 2024-10-30 PROCEDURE — 96375 TX/PRO/DX INJ NEW DRUG ADDON: CPT

## 2024-10-30 PROCEDURE — 96366 THER/PROPH/DIAG IV INF ADDON: CPT

## 2024-10-30 PROCEDURE — 96365 THER/PROPH/DIAG IV INF INIT: CPT

## 2024-10-30 PROCEDURE — 2580000003 HC RX 258: Performed by: INTERNAL MEDICINE

## 2024-10-30 PROCEDURE — 6360000002 HC RX W HCPCS: Performed by: INTERNAL MEDICINE

## 2024-10-30 PROCEDURE — 99211 OFF/OP EST MAY X REQ PHY/QHP: CPT

## 2024-10-30 PROCEDURE — 83880 ASSAY OF NATRIURETIC PEPTIDE: CPT

## 2024-10-30 RX ORDER — FUROSEMIDE 10 MG/ML
120 INJECTION INTRAMUSCULAR; INTRAVENOUS ONCE
Status: COMPLETED | OUTPATIENT
Start: 2024-10-30 | End: 2024-10-30

## 2024-10-30 RX ORDER — SODIUM CHLORIDE 0.9 % (FLUSH) 0.9 %
5-40 SYRINGE (ML) INJECTION ONCE
Status: COMPLETED | OUTPATIENT
Start: 2024-10-30 | End: 2024-10-30

## 2024-10-30 RX ORDER — SODIUM CHLORIDE 0.9 % (FLUSH) 0.9 %
5-40 SYRINGE (ML) INJECTION ONCE
Status: CANCELLED
Start: 2024-10-31 | End: 2024-10-31

## 2024-10-30 RX ORDER — FUROSEMIDE 10 MG/ML
120 INJECTION INTRAMUSCULAR; INTRAVENOUS ONCE
Status: CANCELLED
Start: 2024-10-31 | End: 2024-10-31

## 2024-10-30 RX ADMIN — FUROSEMIDE 120 MG: 10 INJECTION, SOLUTION INTRAMUSCULAR; INTRAVENOUS at 10:49

## 2024-10-30 RX ADMIN — SODIUM CHLORIDE, PRESERVATIVE FREE 10 ML: 5 INJECTION INTRAVENOUS at 09:42

## 2024-10-30 RX ADMIN — FUROSEMIDE 20 MG/HR: 10 INJECTION, SOLUTION INTRAMUSCULAR; INTRAVENOUS at 10:58

## 2024-10-30 NOTE — PROGRESS NOTES
Pt to Dept for Lasix IVP and Lasix gtt as ordered from Dr. Parsons. Weight obtained and Labs drawn this AM. Results reviewed per Nhung Fernandez CNP ,Ok to proceed with Tx as ordered today. Pt took Metolazone dose this morning.  Lasix 120mg given IVP over 8 minutes followed by Lasix gtt started at 20mg/hr for 4 hours. Pt scheduled to return next Wednesday for same treatment. Pt discharged home at this time per W/C with Home O2 with .

## 2024-10-31 ENCOUNTER — APPOINTMENT (OUTPATIENT)
Dept: ONCOLOGY | Age: 54
End: 2024-10-31
Payer: COMMERCIAL

## 2024-10-31 RX ORDER — FERROUS SULFATE 325(65) MG
1 TABLET ORAL 2 TIMES DAILY WITH MEALS
Qty: 180 TABLET | Refills: 0 | Status: SHIPPED | OUTPATIENT
Start: 2024-10-31

## 2024-10-31 RX ORDER — MONTELUKAST SODIUM 10 MG/1
10 TABLET ORAL
Qty: 30 TABLET | Refills: 5 | Status: SHIPPED | OUTPATIENT
Start: 2024-10-31

## 2024-11-05 ENCOUNTER — OFFICE VISIT (OUTPATIENT)
Dept: INTERNAL MEDICINE CLINIC | Age: 54
End: 2024-11-05

## 2024-11-05 VITALS
SYSTOLIC BLOOD PRESSURE: 108 MMHG | DIASTOLIC BLOOD PRESSURE: 72 MMHG | OXYGEN SATURATION: 94 % | BODY MASS INDEX: 34.88 KG/M2 | WEIGHT: 203.2 LBS | HEART RATE: 86 BPM

## 2024-11-05 DIAGNOSIS — K59.03 CONSTIPATION DUE TO PAIN MEDICATION: ICD-10-CM

## 2024-11-05 DIAGNOSIS — L85.3 DRY SKIN: ICD-10-CM

## 2024-11-05 DIAGNOSIS — B35.6 TINEA CRURIS: Primary | ICD-10-CM

## 2024-11-05 DIAGNOSIS — M51.362 DEGENERATION OF INTERVERTEBRAL DISC OF LUMBAR REGION WITH DISCOGENIC BACK PAIN AND LOWER EXTREMITY PAIN: ICD-10-CM

## 2024-11-05 DIAGNOSIS — I50.32 CHRONIC DIASTOLIC CONGESTIVE HEART FAILURE (HCC): ICD-10-CM

## 2024-11-05 PROBLEM — R47.1 DYSARTHRIA: Status: RESOLVED | Noted: 2024-06-30 | Resolved: 2024-11-05

## 2024-11-05 PROBLEM — Z76.89 ENCOUNTER FOR WHEELCHAIR ASSESSMENT: Status: RESOLVED | Noted: 2024-08-30 | Resolved: 2024-11-05

## 2024-11-05 PROBLEM — R29.898 LEFT LEG WEAKNESS: Status: RESOLVED | Noted: 2024-06-30 | Resolved: 2024-11-05

## 2024-11-05 PROBLEM — M54.42 ACUTE LEFT-SIDED LOW BACK PAIN WITH LEFT-SIDED SCIATICA: Status: RESOLVED | Noted: 2024-06-30 | Resolved: 2024-11-05

## 2024-11-05 PROBLEM — Z85.3 HX OF BREAST CANCER: Status: RESOLVED | Noted: 2018-01-12 | Resolved: 2024-11-05

## 2024-11-05 PROBLEM — R29.898 WEAKNESS OF RIGHT UPPER EXTREMITY: Status: RESOLVED | Noted: 2024-06-30 | Resolved: 2024-11-05

## 2024-11-05 RX ORDER — UREA 40 %
CREAM (GRAM) TOPICAL
Qty: 227 G | Refills: 1 | Status: SHIPPED | OUTPATIENT
Start: 2024-11-05

## 2024-11-05 RX ORDER — NYSTATIN 100000 U/G
CREAM TOPICAL
Qty: 30 G | Refills: 1 | Status: SHIPPED | OUTPATIENT
Start: 2024-11-05

## 2024-11-05 NOTE — ASSESSMENT & PLAN NOTE
Pain has improved since undergoing ORIF to her lumbar spine approximately 2 weeks ago.  Is able to be more mobile and walk with less pain.

## 2024-11-05 NOTE — ASSESSMENT & PLAN NOTE
Apply urea cream to heels at at bedtime and cover with petroleum jelly.  Suspect cracked, dry heels relates to chronic swelling/resolution of swelling in her feet.

## 2024-11-05 NOTE — ASSESSMENT & PLAN NOTE
Stable.  Continues to get Lasix infusions once weekly and does notice weight gain as she gets closer to her next infusion date.  Remains on Zaroxolyn 5 mg once weekly prior to insulin infusions, metoprolol 25 mg twice daily, spironolactone 50 mg daily, torsemide 100 mg daily to twice daily depending on her weight.

## 2024-11-05 NOTE — PROGRESS NOTES
albuterol (PROVENTIL) (2.5 MG/3ML) 0.083% nebulizer solution Take 3 mLs by nebulization every 6 hours as needed for Wheezing or Shortness of Breath 120 each 1    brexpiprazole (REXULTI) 4 MG TABS tablet Take 0.5 tablets by mouth at bedtime      loratadine (CLARITIN) 10 MG tablet TAKE 1 TABLET BY MOUTH ONE TIME A DAY  (Patient taking differently: Take 1 tablet by mouth daily) 30 tablet 5    OXYGEN Inhale 2 L into the lungs continuous Sometimes with activity      oxyCODONE (OXYCONTIN) 20 MG extended release tablet Take 1 tablet by mouth in the morning and 1 tablet in the evening.      aspirin 81 MG EC tablet Take 1 tablet by mouth daily      benztropine (COGENTIN) 2 MG tablet Take 1 tablet by mouth nightly      duloxetine (CYMBALTA) 60 MG capsule Take 1 capsule by mouth 2 times daily       No current facility-administered medications for this visit.       Return in about 2 months (around 1/5/2025).

## 2024-11-05 NOTE — PATIENT INSTRUCTIONS
Apply nystatin cream under your breast twice daily until area has healed.     Apply urea cream to your heels at bedtime and cover with petroleum jelly.

## 2024-11-06 DIAGNOSIS — I25.10 CORONARY ARTERY DISEASE INVOLVING NATIVE CORONARY ARTERY OF NATIVE HEART WITHOUT ANGINA PECTORIS: Chronic | ICD-10-CM

## 2024-11-06 DIAGNOSIS — I42.9 CARDIOMYOPATHY, UNSPECIFIED TYPE (HCC): Chronic | ICD-10-CM

## 2024-11-06 DIAGNOSIS — I25.83 CORONARY ARTERY DISEASE DUE TO LIPID RICH PLAQUE: Chronic | ICD-10-CM

## 2024-11-06 DIAGNOSIS — N28.9 RENAL INSUFFICIENCY: Chronic | ICD-10-CM

## 2024-11-06 DIAGNOSIS — I25.10 CORONARY ARTERY DISEASE DUE TO LIPID RICH PLAQUE: Chronic | ICD-10-CM

## 2024-11-06 DIAGNOSIS — I50.30 (HFPEF) HEART FAILURE WITH PRESERVED EJECTION FRACTION (HCC): Chronic | ICD-10-CM

## 2024-11-07 ENCOUNTER — HOSPITAL ENCOUNTER (OUTPATIENT)
Dept: ONCOLOGY | Age: 54
Setting detail: INFUSION SERIES
Discharge: HOME | End: 2024-11-07
Payer: COMMERCIAL

## 2024-11-07 VITALS
BODY MASS INDEX: 34.28 KG/M2 | SYSTOLIC BLOOD PRESSURE: 100 MMHG | RESPIRATION RATE: 20 BRPM | WEIGHT: 199.7 LBS | HEART RATE: 70 BPM | DIASTOLIC BLOOD PRESSURE: 57 MMHG | TEMPERATURE: 97 F

## 2024-11-07 DIAGNOSIS — I50.32 CHRONIC DIASTOLIC CONGESTIVE HEART FAILURE (HCC): ICD-10-CM

## 2024-11-07 DIAGNOSIS — R06.02 SHORTNESS OF BREATH: Primary | ICD-10-CM

## 2024-11-07 DIAGNOSIS — I50.32 CHRONIC HEART FAILURE WITH PRESERVED EJECTION FRACTION (HCC): ICD-10-CM

## 2024-11-07 LAB
ANION GAP SERPL CALCULATED.3IONS-SCNC: 11 MMOL/L (ref 3–16)
BUN SERPL-MCNC: 22 MG/DL (ref 7–20)
CALCIUM SERPL-MCNC: 9.7 MG/DL (ref 8.3–10.6)
CHLORIDE SERPL-SCNC: 96 MMOL/L (ref 99–110)
CO2 SERPL-SCNC: 32 MMOL/L (ref 21–32)
CREAT SERPL-MCNC: 1.3 MG/DL (ref 0.6–1.1)
GFR SERPLBLD CREATININE-BSD FMLA CKD-EPI: 48 ML/MIN/{1.73_M2}
GLUCOSE SERPL-MCNC: 293 MG/DL (ref 70–99)
NT-PROBNP SERPL-MCNC: 213 PG/ML (ref 0–124)
POTASSIUM SERPL-SCNC: 4.3 MMOL/L (ref 3.5–5.1)
SODIUM SERPL-SCNC: 139 MMOL/L (ref 136–145)

## 2024-11-07 PROCEDURE — 80048 BASIC METABOLIC PNL TOTAL CA: CPT

## 2024-11-07 PROCEDURE — 99211 OFF/OP EST MAY X REQ PHY/QHP: CPT

## 2024-11-07 PROCEDURE — 96375 TX/PRO/DX INJ NEW DRUG ADDON: CPT

## 2024-11-07 PROCEDURE — 96366 THER/PROPH/DIAG IV INF ADDON: CPT

## 2024-11-07 PROCEDURE — 96365 THER/PROPH/DIAG IV INF INIT: CPT

## 2024-11-07 PROCEDURE — 6360000002 HC RX W HCPCS: Performed by: INTERNAL MEDICINE

## 2024-11-07 PROCEDURE — 83880 ASSAY OF NATRIURETIC PEPTIDE: CPT

## 2024-11-07 PROCEDURE — 2580000003 HC RX 258: Performed by: INTERNAL MEDICINE

## 2024-11-07 RX ORDER — SODIUM CHLORIDE 0.9 % (FLUSH) 0.9 %
5-40 SYRINGE (ML) INJECTION ONCE
Status: CANCELLED
Start: 2024-11-14 | End: 2024-11-14

## 2024-11-07 RX ORDER — FUROSEMIDE 10 MG/ML
120 INJECTION INTRAMUSCULAR; INTRAVENOUS ONCE
Status: COMPLETED | OUTPATIENT
Start: 2024-11-07 | End: 2024-11-07

## 2024-11-07 RX ORDER — SODIUM CHLORIDE 0.9 % (FLUSH) 0.9 %
5-40 SYRINGE (ML) INJECTION ONCE
Status: COMPLETED | OUTPATIENT
Start: 2024-11-07 | End: 2024-11-07

## 2024-11-07 RX ORDER — METOPROLOL TARTRATE 25 MG/1
25 TABLET, FILM COATED ORAL 2 TIMES DAILY
Qty: 180 TABLET | Refills: 0 | Status: SHIPPED | OUTPATIENT
Start: 2024-11-07

## 2024-11-07 RX ORDER — FUROSEMIDE 10 MG/ML
120 INJECTION INTRAMUSCULAR; INTRAVENOUS ONCE
Status: CANCELLED
Start: 2024-11-14 | End: 2024-11-14

## 2024-11-07 RX ADMIN — FUROSEMIDE 20 MG/HR: 10 INJECTION, SOLUTION INTRAVENOUS at 10:38

## 2024-11-07 RX ADMIN — FUROSEMIDE 120 MG: 10 INJECTION, SOLUTION INTRAMUSCULAR; INTRAVENOUS at 10:28

## 2024-11-07 RX ADMIN — SODIUM CHLORIDE, PRESERVATIVE FREE 10 ML: 5 INJECTION INTRAVENOUS at 10:28

## 2024-11-08 NOTE — PROGRESS NOTES
extremity following lymphadenectomy    5. Type 2 diabetes mellitus with hyperglycemia, with long-term current use of insulin (HCC)    6. Iron deficiency anemia, unspecified iron deficiency anemia type    7. Chronic diastolic heart failure (HCC)    8. SOB (shortness of breath)            Chronic heart failure with preserved ejection fraction:    decompensated.    Echo 3/7/24> LVEF 60%   BNP  11/7/24: 213  10/30/24: 219    ECHO  3/7/24:  EF 60%    Heart Failure Therapies:  The 4 Pillars of Heart Failure Therapies     1. ACE inhibitors, ARBs, or ARNI (Entresto):  NONE  2. Beta blockers:  metoprolol tartrate (LOPRESSOR) 25 MG  3. Aldosterone blockers:  spironolactone (ALDACTONE) 50 MG tablet  4. SGLT2 inhibitors:  empagliflozin (JARDIANCE) 10 MG    Complains of swelling, dizziness. Chronic SOB, not worsening.    PLAN: continue weekly IV lasix infusions  0Routine labs: CMP, CBC, BNP    Coronary artery disease, non obstructive  clopidogrel - 75 MG  rosuvastatin - 20 MG (high intensity)  [facility] 2014 with diffuse LAD disease and small vessel disease.     Complains of swelling, dizziness. Chronic SOB, not worsening.    PLAN: Routine labs: CMP, CBC, BNP    SOB (shortness of breath), chronic     Denies SOB. Oxygen 92% on 2L via NC    Plan: Informed to call my office if any new or worsening symptoms. Pt expressed understanding and agrees with plan. Routine labs CMP, CBC    Essential hypertension  Metoprolol (Lopressor)   and torsemide (DEMADEX)     Complains of swelling, dizziness. Chronic SOB, not worsening.    Plan: Routine labs CMP, CBC. Keep systolic -130    LUIS (obstructive sleep apnea)  On CPAP: yes  Compliant on CPAP: yes    PLAN: Follows with Dior Olivo MD Pulmonology.    Hyperlipidemia  clopidogrel - 75 MG  rosuvastatin - 20 MG  1/5/24> , HDL 47, LDL 88,   9/14/23 , HDL 30, LDL 88, .     Plan: Lipid panel FASTING.   Goal: with CAD keep LDL <70; without CAD, <100    Renal insufficiency,

## 2024-11-13 DIAGNOSIS — E78.5 HYPERLIPIDEMIA LDL GOAL <70: ICD-10-CM

## 2024-11-14 ENCOUNTER — HOSPITAL ENCOUNTER (OUTPATIENT)
Dept: ONCOLOGY | Age: 54
Setting detail: INFUSION SERIES
Discharge: HOME | End: 2024-11-14
Payer: COMMERCIAL

## 2024-11-14 VITALS
DIASTOLIC BLOOD PRESSURE: 57 MMHG | OXYGEN SATURATION: 96 % | BODY MASS INDEX: 34.57 KG/M2 | RESPIRATION RATE: 16 BRPM | SYSTOLIC BLOOD PRESSURE: 107 MMHG | WEIGHT: 201.4 LBS | HEART RATE: 80 BPM | TEMPERATURE: 97.7 F

## 2024-11-14 DIAGNOSIS — I50.32 CHRONIC DIASTOLIC CONGESTIVE HEART FAILURE (HCC): Primary | ICD-10-CM

## 2024-11-14 DIAGNOSIS — R06.02 SHORTNESS OF BREATH: ICD-10-CM

## 2024-11-14 DIAGNOSIS — I50.32 CHRONIC HEART FAILURE WITH PRESERVED EJECTION FRACTION (HCC): ICD-10-CM

## 2024-11-14 LAB
ANION GAP SERPL CALCULATED.3IONS-SCNC: 14 MMOL/L (ref 3–16)
BUN SERPL-MCNC: 37 MG/DL (ref 7–20)
CALCIUM SERPL-MCNC: 9.6 MG/DL (ref 8.3–10.6)
CHLORIDE SERPL-SCNC: 90 MMOL/L (ref 99–110)
CO2 SERPL-SCNC: 33 MMOL/L (ref 21–32)
CREAT SERPL-MCNC: 1.4 MG/DL (ref 0.6–1.1)
GFR SERPLBLD CREATININE-BSD FMLA CKD-EPI: 44 ML/MIN/{1.73_M2}
GLUCOSE BLD-MCNC: 228 MG/DL (ref 70–99)
GLUCOSE SERPL-MCNC: 236 MG/DL (ref 70–99)
NT-PROBNP SERPL-MCNC: 270 PG/ML (ref 0–124)
PERFORMED ON: ABNORMAL
POTASSIUM SERPL-SCNC: 3.8 MMOL/L (ref 3.5–5.1)
SODIUM SERPL-SCNC: 137 MMOL/L (ref 136–145)

## 2024-11-14 PROCEDURE — 96365 THER/PROPH/DIAG IV INF INIT: CPT

## 2024-11-14 PROCEDURE — 6360000002 HC RX W HCPCS: Performed by: INTERNAL MEDICINE

## 2024-11-14 PROCEDURE — 80048 BASIC METABOLIC PNL TOTAL CA: CPT

## 2024-11-14 PROCEDURE — 2580000003 HC RX 258: Performed by: INTERNAL MEDICINE

## 2024-11-14 PROCEDURE — 83880 ASSAY OF NATRIURETIC PEPTIDE: CPT

## 2024-11-14 PROCEDURE — 96375 TX/PRO/DX INJ NEW DRUG ADDON: CPT

## 2024-11-14 PROCEDURE — 96366 THER/PROPH/DIAG IV INF ADDON: CPT

## 2024-11-14 PROCEDURE — 99211 OFF/OP EST MAY X REQ PHY/QHP: CPT

## 2024-11-14 RX ORDER — SODIUM CHLORIDE 0.9 % (FLUSH) 0.9 %
5-40 SYRINGE (ML) INJECTION ONCE
Status: CANCELLED
Start: 2024-11-21 | End: 2024-11-21

## 2024-11-14 RX ORDER — FUROSEMIDE 10 MG/ML
120 INJECTION INTRAMUSCULAR; INTRAVENOUS ONCE
Status: CANCELLED
Start: 2024-11-21 | End: 2024-11-21

## 2024-11-14 RX ORDER — SODIUM CHLORIDE 0.9 % (FLUSH) 0.9 %
5-40 SYRINGE (ML) INJECTION ONCE
Status: COMPLETED | OUTPATIENT
Start: 2024-11-14 | End: 2024-11-14

## 2024-11-14 RX ORDER — FUROSEMIDE 10 MG/ML
120 INJECTION INTRAMUSCULAR; INTRAVENOUS ONCE
Status: COMPLETED | OUTPATIENT
Start: 2024-11-14 | End: 2024-11-14

## 2024-11-14 RX ORDER — ROSUVASTATIN CALCIUM 20 MG/1
20 TABLET, COATED ORAL NIGHTLY
Qty: 90 TABLET | Refills: 1 | Status: SHIPPED | OUTPATIENT
Start: 2024-11-14

## 2024-11-14 RX ADMIN — SODIUM CHLORIDE, PRESERVATIVE FREE 10 ML: 5 INJECTION INTRAVENOUS at 10:35

## 2024-11-14 RX ADMIN — FUROSEMIDE 20 MG/HR: 10 INJECTION, SOLUTION INTRAMUSCULAR; INTRAVENOUS at 10:40

## 2024-11-14 RX ADMIN — FUROSEMIDE 120 MG: 10 INJECTION, SOLUTION INTRAMUSCULAR; INTRAVENOUS at 10:32

## 2024-11-14 NOTE — PROGRESS NOTES
Pt to Dept for Lasix IVP and Lasix gtt as ordered from Dr. Parsons. Pt reporting she fell yesterday due to dizziness. Pt hit back of her head, Pt was not evaluated at the ER. Nhung from [facility] updated. Weight obtained and Labs drawn this AM. Results reviewed per Nhung Fernandez CNP ,Ok to proceed with Tx as ordered today. Pt took Metolazone dose this morning.  Lasix 120mg given IVP over 6 minutes followed by Lasix gtt started at 20mg/hr for 4 hours. Pt scheduled to return next Wednesday for same treatment. Pt discharged home at this time per W/C with Home O2 with .Pt assisted to bathroom times 2, Pt noted to have weakness. Blood sugar checked at 1324 per pt's request. Blood sugar 228. Verbalized to Pt and Pt's  if patient continues to feel Dizzy and increased weakness, pt needs to go to ER to be evaluated. Verbalized understanding

## 2024-11-18 ENCOUNTER — OFFICE VISIT (OUTPATIENT)
Dept: CARDIOLOGY CLINIC | Age: 54
End: 2024-11-18
Payer: COMMERCIAL

## 2024-11-18 VITALS
OXYGEN SATURATION: 92 % | DIASTOLIC BLOOD PRESSURE: 62 MMHG | HEART RATE: 67 BPM | HEIGHT: 64 IN | WEIGHT: 199 LBS | BODY MASS INDEX: 33.97 KG/M2 | SYSTOLIC BLOOD PRESSURE: 106 MMHG

## 2024-11-18 DIAGNOSIS — D50.9 IRON DEFICIENCY ANEMIA, UNSPECIFIED IRON DEFICIENCY ANEMIA TYPE: ICD-10-CM

## 2024-11-18 DIAGNOSIS — E11.65 TYPE 2 DIABETES MELLITUS WITH HYPERGLYCEMIA, WITH LONG-TERM CURRENT USE OF INSULIN (HCC): ICD-10-CM

## 2024-11-18 DIAGNOSIS — I50.32 CHRONIC DIASTOLIC HEART FAILURE (HCC): ICD-10-CM

## 2024-11-18 DIAGNOSIS — I50.32 CHRONIC HEART FAILURE WITH PRESERVED EJECTION FRACTION (HCC): Primary | ICD-10-CM

## 2024-11-18 DIAGNOSIS — I10 ESSENTIAL HYPERTENSION: ICD-10-CM

## 2024-11-18 DIAGNOSIS — R06.02 SOB (SHORTNESS OF BREATH): ICD-10-CM

## 2024-11-18 DIAGNOSIS — N28.9 RENAL INSUFFICIENCY: ICD-10-CM

## 2024-11-18 DIAGNOSIS — I89.0 LYMPHEDEMA OF UPPER EXTREMITY FOLLOWING LYMPHADENECTOMY: ICD-10-CM

## 2024-11-18 DIAGNOSIS — Z79.4 TYPE 2 DIABETES MELLITUS WITH HYPERGLYCEMIA, WITH LONG-TERM CURRENT USE OF INSULIN (HCC): ICD-10-CM

## 2024-11-18 DIAGNOSIS — E89.89 LYMPHEDEMA OF UPPER EXTREMITY FOLLOWING LYMPHADENECTOMY: ICD-10-CM

## 2024-11-18 PROCEDURE — 99215 OFFICE O/P EST HI 40 MIN: CPT | Performed by: INTERNAL MEDICINE

## 2024-11-18 PROCEDURE — G8484 FLU IMMUNIZE NO ADMIN: HCPCS | Performed by: INTERNAL MEDICINE

## 2024-11-18 PROCEDURE — G8417 CALC BMI ABV UP PARAM F/U: HCPCS | Performed by: INTERNAL MEDICINE

## 2024-11-18 PROCEDURE — 3078F DIAST BP <80 MM HG: CPT | Performed by: INTERNAL MEDICINE

## 2024-11-18 PROCEDURE — 3074F SYST BP LT 130 MM HG: CPT | Performed by: INTERNAL MEDICINE

## 2024-11-18 PROCEDURE — 3017F COLORECTAL CA SCREEN DOC REV: CPT | Performed by: INTERNAL MEDICINE

## 2024-11-18 PROCEDURE — 2022F DILAT RTA XM EVC RTNOPTHY: CPT | Performed by: INTERNAL MEDICINE

## 2024-11-18 PROCEDURE — 1036F TOBACCO NON-USER: CPT | Performed by: INTERNAL MEDICINE

## 2024-11-18 PROCEDURE — 3052F HG A1C>EQUAL 8.0%<EQUAL 9.0%: CPT | Performed by: INTERNAL MEDICINE

## 2024-11-18 PROCEDURE — G8427 DOCREV CUR MEDS BY ELIG CLIN: HCPCS | Performed by: INTERNAL MEDICINE

## 2024-11-18 RX ORDER — TORSEMIDE 100 MG/1
TABLET ORAL
Qty: 180 TABLET | Refills: 3 | Status: SHIPPED | OUTPATIENT
Start: 2024-11-18

## 2024-11-18 RX ORDER — METOLAZONE 5 MG/1
5 TABLET ORAL WEEKLY
Qty: 20 TABLET | Refills: 3 | Status: SHIPPED | OUTPATIENT
Start: 2024-11-18

## 2024-11-18 NOTE — PATIENT INSTRUCTIONS
Continue current medications, no changes  Ask infusion center to get labs next time you are there  Routine labs: CBC, LFT, iron and ferritin   Follow up with Dr. Parsons 3 months with Nhung Fernandez NP

## 2024-11-21 ENCOUNTER — HOSPITAL ENCOUNTER (OUTPATIENT)
Dept: ONCOLOGY | Age: 54
Setting detail: INFUSION SERIES
Discharge: HOME | End: 2024-11-21
Payer: COMMERCIAL

## 2024-11-21 VITALS
SYSTOLIC BLOOD PRESSURE: 132 MMHG | BODY MASS INDEX: 33.88 KG/M2 | RESPIRATION RATE: 16 BRPM | DIASTOLIC BLOOD PRESSURE: 66 MMHG | WEIGHT: 197.4 LBS | TEMPERATURE: 96.9 F | HEART RATE: 74 BPM

## 2024-11-21 DIAGNOSIS — R06.02 SHORTNESS OF BREATH: ICD-10-CM

## 2024-11-21 DIAGNOSIS — I50.32 CHRONIC DIASTOLIC CONGESTIVE HEART FAILURE (HCC): Primary | ICD-10-CM

## 2024-11-21 DIAGNOSIS — I50.32 CHRONIC HEART FAILURE WITH PRESERVED EJECTION FRACTION (HCC): ICD-10-CM

## 2024-11-21 DIAGNOSIS — N28.9 RENAL INSUFFICIENCY: ICD-10-CM

## 2024-11-21 DIAGNOSIS — D50.9 IRON DEFICIENCY ANEMIA, UNSPECIFIED IRON DEFICIENCY ANEMIA TYPE: ICD-10-CM

## 2024-11-21 DIAGNOSIS — I10 ESSENTIAL HYPERTENSION: ICD-10-CM

## 2024-11-21 LAB
ALBUMIN SERPL-MCNC: 3.5 G/DL (ref 3.4–5)
ALP SERPL-CCNC: 92 U/L (ref 40–129)
ALT SERPL-CCNC: 12 U/L (ref 10–40)
ANION GAP SERPL CALCULATED.3IONS-SCNC: 12 MMOL/L (ref 3–16)
AST SERPL-CCNC: 19 U/L (ref 15–37)
BILIRUB DIRECT SERPL-MCNC: <0.1 MG/DL (ref 0–0.3)
BILIRUB INDIRECT SERPL-MCNC: ABNORMAL MG/DL (ref 0–1)
BILIRUB SERPL-MCNC: <0.2 MG/DL (ref 0–1)
BUN SERPL-MCNC: 28 MG/DL (ref 7–20)
CALCIUM SERPL-MCNC: 9.1 MG/DL (ref 8.3–10.6)
CHLORIDE SERPL-SCNC: 94 MMOL/L (ref 99–110)
CO2 SERPL-SCNC: 35 MMOL/L (ref 21–32)
CREAT SERPL-MCNC: 1.2 MG/DL (ref 0.6–1.1)
DEPRECATED RDW RBC AUTO: 15.8 % (ref 12.4–15.4)
FERRITIN SERPL IA-MCNC: 209 NG/ML (ref 15–150)
GFR SERPLBLD CREATININE-BSD FMLA CKD-EPI: 53 ML/MIN/{1.73_M2}
GLUCOSE SERPL-MCNC: 247 MG/DL (ref 70–99)
HCT VFR BLD AUTO: 36.9 % (ref 36–48)
HGB BLD-MCNC: 11.7 G/DL (ref 12–16)
IRON SATN MFR SERPL: 21 % (ref 15–50)
IRON SERPL-MCNC: 45 UG/DL (ref 37–145)
MCH RBC QN AUTO: 27.6 PG (ref 26–34)
MCHC RBC AUTO-ENTMCNC: 31.8 G/DL (ref 31–36)
MCV RBC AUTO: 86.8 FL (ref 80–100)
NT-PROBNP SERPL-MCNC: 215 PG/ML (ref 0–124)
PLATELET # BLD AUTO: 236 K/UL (ref 135–450)
PMV BLD AUTO: 9.2 FL (ref 5–10.5)
POTASSIUM SERPL-SCNC: 3.9 MMOL/L (ref 3.5–5.1)
PROT SERPL-MCNC: 5.8 G/DL (ref 6.4–8.2)
RBC # BLD AUTO: 4.25 M/UL (ref 4–5.2)
SODIUM SERPL-SCNC: 141 MMOL/L (ref 136–145)
TIBC SERPL-MCNC: 217 UG/DL (ref 260–445)
WBC # BLD AUTO: 8.8 K/UL (ref 4–11)

## 2024-11-21 PROCEDURE — 82728 ASSAY OF FERRITIN: CPT

## 2024-11-21 PROCEDURE — 85027 COMPLETE CBC AUTOMATED: CPT

## 2024-11-21 PROCEDURE — 96375 TX/PRO/DX INJ NEW DRUG ADDON: CPT

## 2024-11-21 PROCEDURE — 96365 THER/PROPH/DIAG IV INF INIT: CPT

## 2024-11-21 PROCEDURE — 2580000003 HC RX 258: Performed by: INTERNAL MEDICINE

## 2024-11-21 PROCEDURE — 96366 THER/PROPH/DIAG IV INF ADDON: CPT

## 2024-11-21 PROCEDURE — 80048 BASIC METABOLIC PNL TOTAL CA: CPT

## 2024-11-21 PROCEDURE — 83880 ASSAY OF NATRIURETIC PEPTIDE: CPT

## 2024-11-21 PROCEDURE — 83550 IRON BINDING TEST: CPT

## 2024-11-21 PROCEDURE — 83540 ASSAY OF IRON: CPT

## 2024-11-21 PROCEDURE — 6360000002 HC RX W HCPCS: Performed by: INTERNAL MEDICINE

## 2024-11-21 PROCEDURE — 99211 OFF/OP EST MAY X REQ PHY/QHP: CPT

## 2024-11-21 PROCEDURE — 80076 HEPATIC FUNCTION PANEL: CPT

## 2024-11-21 RX ORDER — FUROSEMIDE 10 MG/ML
120 INJECTION INTRAMUSCULAR; INTRAVENOUS ONCE
Status: COMPLETED | OUTPATIENT
Start: 2024-11-21 | End: 2024-11-21

## 2024-11-21 RX ORDER — SODIUM CHLORIDE 0.9 % (FLUSH) 0.9 %
5-40 SYRINGE (ML) INJECTION ONCE
Start: 2024-11-28 | End: 2024-11-28

## 2024-11-21 RX ORDER — FUROSEMIDE 10 MG/ML
120 INJECTION INTRAMUSCULAR; INTRAVENOUS ONCE
Start: 2024-11-28 | End: 2024-11-28

## 2024-11-21 RX ORDER — SODIUM CHLORIDE 0.9 % (FLUSH) 0.9 %
5-40 SYRINGE (ML) INJECTION ONCE
Status: COMPLETED | OUTPATIENT
Start: 2024-11-21 | End: 2024-11-21

## 2024-11-21 RX ADMIN — FUROSEMIDE 20 MG/HR: 10 INJECTION, SOLUTION INTRAMUSCULAR; INTRAVENOUS at 10:20

## 2024-11-21 RX ADMIN — FUROSEMIDE 120 MG: 10 INJECTION, SOLUTION INTRAMUSCULAR; INTRAVENOUS at 10:14

## 2024-11-21 RX ADMIN — Medication 10 ML: at 10:14

## 2024-11-21 NOTE — PROGRESS NOTES
mg Lasix given over 6 minutes, followed by Lasix infusion 20 mg/hr over 4 hours completed. Pt tolerated well, voided in bathroom multiple times during infusion. Gait steady. Patient continues with some SOB, no change in SOB from beginning of treatment to end.. SaO2 99% with O2 at 2l/NC. . Pt discharged per wheel chair to car accompanied by .  To return next week for weekly IV labs and lasix.

## 2024-11-27 ENCOUNTER — HOSPITAL ENCOUNTER (OUTPATIENT)
Dept: ONCOLOGY | Age: 54
Setting detail: INFUSION SERIES
Discharge: HOME | End: 2024-11-27
Payer: COMMERCIAL

## 2024-11-27 VITALS
DIASTOLIC BLOOD PRESSURE: 59 MMHG | WEIGHT: 198.9 LBS | RESPIRATION RATE: 16 BRPM | TEMPERATURE: 97 F | BODY MASS INDEX: 34.14 KG/M2 | HEART RATE: 69 BPM | SYSTOLIC BLOOD PRESSURE: 104 MMHG

## 2024-11-27 DIAGNOSIS — I50.32 CHRONIC HEART FAILURE WITH PRESERVED EJECTION FRACTION (HCC): ICD-10-CM

## 2024-11-27 DIAGNOSIS — R06.02 SHORTNESS OF BREATH: ICD-10-CM

## 2024-11-27 DIAGNOSIS — I50.32 CHRONIC DIASTOLIC CONGESTIVE HEART FAILURE (HCC): Primary | ICD-10-CM

## 2024-11-27 LAB
ANION GAP SERPL CALCULATED.3IONS-SCNC: 14 MMOL/L (ref 3–16)
BUN SERPL-MCNC: 49 MG/DL (ref 7–20)
CALCIUM SERPL-MCNC: 9.7 MG/DL (ref 8.3–10.6)
CHLORIDE SERPL-SCNC: 93 MMOL/L (ref 99–110)
CO2 SERPL-SCNC: 29 MMOL/L (ref 21–32)
CREAT SERPL-MCNC: 1.8 MG/DL (ref 0.6–1.1)
GFR SERPLBLD CREATININE-BSD FMLA CKD-EPI: 33 ML/MIN/{1.73_M2}
GLUCOSE SERPL-MCNC: 287 MG/DL (ref 70–99)
NT-PROBNP SERPL-MCNC: 168 PG/ML (ref 0–124)
POTASSIUM SERPL-SCNC: 3.2 MMOL/L (ref 3.5–5.1)
SODIUM SERPL-SCNC: 136 MMOL/L (ref 136–145)

## 2024-11-27 PROCEDURE — 83880 ASSAY OF NATRIURETIC PEPTIDE: CPT

## 2024-11-27 PROCEDURE — 2580000003 HC RX 258: Performed by: INTERNAL MEDICINE

## 2024-11-27 PROCEDURE — 99211 OFF/OP EST MAY X REQ PHY/QHP: CPT

## 2024-11-27 PROCEDURE — 80048 BASIC METABOLIC PNL TOTAL CA: CPT

## 2024-11-27 PROCEDURE — 96365 THER/PROPH/DIAG IV INF INIT: CPT

## 2024-11-27 PROCEDURE — 6360000002 HC RX W HCPCS: Performed by: INTERNAL MEDICINE

## 2024-11-27 PROCEDURE — 96366 THER/PROPH/DIAG IV INF ADDON: CPT

## 2024-11-27 RX ORDER — FUROSEMIDE 10 MG/ML
120 INJECTION INTRAMUSCULAR; INTRAVENOUS ONCE
Status: CANCELLED
Start: 2024-11-28 | End: 2024-11-28

## 2024-11-27 RX ORDER — SODIUM CHLORIDE 0.9 % (FLUSH) 0.9 %
5-40 SYRINGE (ML) INJECTION ONCE
Status: CANCELLED
Start: 2024-11-28 | End: 2024-11-28

## 2024-11-27 RX ORDER — POTASSIUM CHLORIDE 1500 MG/1
20 TABLET, EXTENDED RELEASE ORAL DAILY
Qty: 90 TABLET | Refills: 1 | Status: SHIPPED | OUTPATIENT
Start: 2024-11-27

## 2024-11-27 RX ORDER — FUROSEMIDE 10 MG/ML
120 INJECTION INTRAMUSCULAR; INTRAVENOUS ONCE
Status: COMPLETED | OUTPATIENT
Start: 2024-11-27 | End: 2024-11-27

## 2024-11-27 RX ORDER — SODIUM CHLORIDE 0.9 % (FLUSH) 0.9 %
5-40 SYRINGE (ML) INJECTION ONCE
Status: COMPLETED | OUTPATIENT
Start: 2024-11-27 | End: 2024-11-27

## 2024-11-27 RX ADMIN — FUROSEMIDE 20 MG/HR: 10 INJECTION, SOLUTION INTRAMUSCULAR; INTRAVENOUS at 10:16

## 2024-11-27 RX ADMIN — FUROSEMIDE 120 MG: 10 INJECTION, SOLUTION INTRAMUSCULAR; INTRAVENOUS at 10:03

## 2024-11-27 RX ADMIN — SODIUM CHLORIDE, PRESERVATIVE FREE 10 ML: 5 INJECTION INTRAVENOUS at 10:16

## 2024-11-27 NOTE — PROGRESS NOTES
Clarified potassium level with Nhung Fernandez NP, patient and  state that patient does not have any potassium supply at home to take.  Nhung states she will call a prescription in.

## 2024-11-27 NOTE — PROGRESS NOTES
Patient completed lasix infusion.  Patient tolerated lasix injection and infusion.  Patient notified that potassium prescription had been sent to [facility] pharmacy to be picked up for patient's low potassium level.  Patient and  verbalized understanding.  Patient discharged to home via wheelchair with family.

## 2024-11-27 NOTE — PROGRESS NOTES
Patient to infusion center for labs and possible lasix.  Specimens sent to lab and results called to Nhung Fernandez NP.  Nhung notified of potassium 3.2, Creatinine 1.8 BUN 49.  Ok to proceed with lasix IVP and infusion, dose clarified with Dr Parsons.  Patient and  updated on plan of care.

## 2024-12-05 ENCOUNTER — HOSPITAL ENCOUNTER (OUTPATIENT)
Dept: ONCOLOGY | Age: 54
Setting detail: INFUSION SERIES
Discharge: HOME | End: 2024-12-05
Payer: COMMERCIAL

## 2024-12-05 VITALS
RESPIRATION RATE: 16 BRPM | SYSTOLIC BLOOD PRESSURE: 116 MMHG | HEART RATE: 67 BPM | TEMPERATURE: 97 F | DIASTOLIC BLOOD PRESSURE: 69 MMHG

## 2024-12-05 DIAGNOSIS — I50.32 CHRONIC DIASTOLIC CONGESTIVE HEART FAILURE (HCC): Primary | ICD-10-CM

## 2024-12-05 DIAGNOSIS — I50.32 CHRONIC HEART FAILURE WITH PRESERVED EJECTION FRACTION (HCC): ICD-10-CM

## 2024-12-05 DIAGNOSIS — R06.02 SHORTNESS OF BREATH: ICD-10-CM

## 2024-12-05 LAB
ANION GAP SERPL CALCULATED.3IONS-SCNC: 9 MMOL/L (ref 3–16)
BUN SERPL-MCNC: 25 MG/DL (ref 7–20)
CALCIUM SERPL-MCNC: 9.3 MG/DL (ref 8.3–10.6)
CHLORIDE SERPL-SCNC: 99 MMOL/L (ref 99–110)
CO2 SERPL-SCNC: 34 MMOL/L (ref 21–32)
CREAT SERPL-MCNC: 1.2 MG/DL (ref 0.6–1.1)
GFR SERPLBLD CREATININE-BSD FMLA CKD-EPI: 53 ML/MIN/{1.73_M2}
GLUCOSE SERPL-MCNC: 80 MG/DL (ref 70–99)
NT-PROBNP SERPL-MCNC: 226 PG/ML (ref 0–124)
POTASSIUM SERPL-SCNC: 4.3 MMOL/L (ref 3.5–5.1)
SODIUM SERPL-SCNC: 142 MMOL/L (ref 136–145)

## 2024-12-05 PROCEDURE — 99211 OFF/OP EST MAY X REQ PHY/QHP: CPT

## 2024-12-05 PROCEDURE — 96365 THER/PROPH/DIAG IV INF INIT: CPT

## 2024-12-05 PROCEDURE — 96375 TX/PRO/DX INJ NEW DRUG ADDON: CPT

## 2024-12-05 PROCEDURE — 96366 THER/PROPH/DIAG IV INF ADDON: CPT

## 2024-12-05 PROCEDURE — 83880 ASSAY OF NATRIURETIC PEPTIDE: CPT

## 2024-12-05 PROCEDURE — 2580000003 HC RX 258: Performed by: INTERNAL MEDICINE

## 2024-12-05 PROCEDURE — 6360000002 HC RX W HCPCS: Performed by: INTERNAL MEDICINE

## 2024-12-05 PROCEDURE — 80048 BASIC METABOLIC PNL TOTAL CA: CPT

## 2024-12-05 RX ORDER — SODIUM CHLORIDE 0.9 % (FLUSH) 0.9 %
5-40 SYRINGE (ML) INJECTION ONCE
Status: CANCELLED
Start: 2024-12-12 | End: 2024-12-12

## 2024-12-05 RX ORDER — FUROSEMIDE 10 MG/ML
120 INJECTION INTRAMUSCULAR; INTRAVENOUS ONCE
Status: CANCELLED
Start: 2024-12-12 | End: 2024-12-12

## 2024-12-05 RX ORDER — FUROSEMIDE 10 MG/ML
120 INJECTION INTRAMUSCULAR; INTRAVENOUS ONCE
Status: COMPLETED | OUTPATIENT
Start: 2024-12-05 | End: 2024-12-05

## 2024-12-05 RX ORDER — LUBIPROSTONE 24 UG/1
CAPSULE ORAL
Qty: 180 CAPSULE | Refills: 0 | Status: SHIPPED | OUTPATIENT
Start: 2024-12-05

## 2024-12-05 RX ORDER — SODIUM CHLORIDE 0.9 % (FLUSH) 0.9 %
5-40 SYRINGE (ML) INJECTION ONCE
Status: DISCONTINUED | OUTPATIENT
Start: 2024-12-05 | End: 2024-12-06 | Stop reason: HOSPADM

## 2024-12-05 RX ADMIN — FUROSEMIDE 20 MG/HR: 10 INJECTION, SOLUTION INTRAMUSCULAR; INTRAVENOUS at 11:01

## 2024-12-05 RX ADMIN — FUROSEMIDE 120 MG: 10 INJECTION, SOLUTION INTRAMUSCULAR; INTRAVENOUS at 10:49

## 2024-12-05 NOTE — PROGRESS NOTES
Pt to Dept for Lasix IVP and Lasix gtt as ordered from Dr. Parsons. Weight obtained and Labs drawn this AM. Results reviewed per Nhung Fernandez CNP ,Ok to proceed with Tx as ordered today. Pt took Metolazone dose this morning.  Lasix 120mg given IVP over 10 minutes followed by Lasix gtt started at 20mg/hr for 4 hours. Pt scheduled to return next Wednesday for same treatment. Pt discharged home at this time per W/C with Home O2 with .

## 2024-12-12 ENCOUNTER — HOSPITAL ENCOUNTER (OUTPATIENT)
Dept: ONCOLOGY | Age: 54
Setting detail: INFUSION SERIES
Discharge: HOME | End: 2024-12-12
Payer: COMMERCIAL

## 2024-12-12 VITALS
WEIGHT: 201.3 LBS | HEART RATE: 71 BPM | TEMPERATURE: 96.9 F | SYSTOLIC BLOOD PRESSURE: 85 MMHG | DIASTOLIC BLOOD PRESSURE: 64 MMHG | BODY MASS INDEX: 34.55 KG/M2 | RESPIRATION RATE: 16 BRPM

## 2024-12-12 DIAGNOSIS — E03.2 HYPOTHYROIDISM DUE TO MEDICATION: ICD-10-CM

## 2024-12-12 DIAGNOSIS — E55.9 VITAMIN D DEFICIENCY: ICD-10-CM

## 2024-12-12 DIAGNOSIS — R06.02 SHORTNESS OF BREATH: ICD-10-CM

## 2024-12-12 DIAGNOSIS — I50.32 CHRONIC DIASTOLIC CONGESTIVE HEART FAILURE (HCC): Primary | ICD-10-CM

## 2024-12-12 DIAGNOSIS — E11.59 TYPE 2 DIABETES MELLITUS WITH OTHER CIRCULATORY COMPLICATION, WITH LONG-TERM CURRENT USE OF INSULIN (HCC): ICD-10-CM

## 2024-12-12 DIAGNOSIS — Z79.4 TYPE 2 DIABETES MELLITUS WITH OTHER CIRCULATORY COMPLICATION, WITH LONG-TERM CURRENT USE OF INSULIN (HCC): ICD-10-CM

## 2024-12-12 DIAGNOSIS — I50.32 CHRONIC HEART FAILURE WITH PRESERVED EJECTION FRACTION (HCC): ICD-10-CM

## 2024-12-12 LAB
ALBUMIN SERPL-MCNC: 3.6 G/DL (ref 3.4–5)
ALBUMIN/GLOB SERPL: 1 {RATIO} (ref 1.1–2.2)
ALP SERPL-CCNC: 102 U/L (ref 40–129)
ALT SERPL-CCNC: 8 U/L (ref 10–40)
ANION GAP SERPL CALCULATED.3IONS-SCNC: 11 MMOL/L (ref 3–16)
ANION GAP SERPL CALCULATED.3IONS-SCNC: 11 MMOL/L (ref 3–16)
AST SERPL-CCNC: 21 U/L (ref 15–37)
BILIRUB SERPL-MCNC: <0.2 MG/DL (ref 0–1)
BUN SERPL-MCNC: 44 MG/DL (ref 7–20)
BUN SERPL-MCNC: 45 MG/DL (ref 7–20)
CALCIUM SERPL-MCNC: 9 MG/DL (ref 8.3–10.6)
CALCIUM SERPL-MCNC: 9.1 MG/DL (ref 8.3–10.6)
CHLORIDE SERPL-SCNC: 100 MMOL/L (ref 99–110)
CHLORIDE SERPL-SCNC: 98 MMOL/L (ref 99–110)
CHOLEST SERPL-MCNC: 150 MG/DL (ref 0–199)
CO2 SERPL-SCNC: 31 MMOL/L (ref 21–32)
CO2 SERPL-SCNC: 31 MMOL/L (ref 21–32)
CREAT SERPL-MCNC: 1.3 MG/DL (ref 0.6–1.1)
CREAT SERPL-MCNC: 1.3 MG/DL (ref 0.6–1.1)
CREAT UR-MCNC: 26.7 MG/DL (ref 28–259)
EST. AVERAGE GLUCOSE BLD GHB EST-MCNC: 211.6 MG/DL
GFR SERPLBLD CREATININE-BSD FMLA CKD-EPI: 48 ML/MIN/{1.73_M2}
GFR SERPLBLD CREATININE-BSD FMLA CKD-EPI: 48 ML/MIN/{1.73_M2}
GLUCOSE SERPL-MCNC: 198 MG/DL (ref 70–99)
GLUCOSE SERPL-MCNC: 199 MG/DL (ref 70–99)
HBA1C MFR BLD: 9 %
HDLC SERPL-MCNC: 29 MG/DL (ref 40–60)
LDLC SERPL CALC-MCNC: ABNORMAL MG/DL
LDLC SERPL-MCNC: 57 MG/DL
MICROALBUMIN UR DL<=1MG/L-MCNC: <1.2 MG/DL
MICROALBUMIN/CREAT UR: ABNORMAL MG/G (ref 0–30)
NT-PROBNP SERPL-MCNC: 175 PG/ML (ref 0–124)
POTASSIUM SERPL-SCNC: 3.9 MMOL/L (ref 3.5–5.1)
POTASSIUM SERPL-SCNC: 3.9 MMOL/L (ref 3.5–5.1)
PROT SERPL-MCNC: 7.3 G/DL (ref 6.4–8.2)
SODIUM SERPL-SCNC: 140 MMOL/L (ref 136–145)
SODIUM SERPL-SCNC: 142 MMOL/L (ref 136–145)
TRIGL SERPL-MCNC: 391 MG/DL (ref 0–150)
TSH SERPL DL<=0.005 MIU/L-ACNC: 1.27 UIU/ML (ref 0.27–4.2)
VLDLC SERPL CALC-MCNC: ABNORMAL MG/DL

## 2024-12-12 PROCEDURE — 80061 LIPID PANEL: CPT

## 2024-12-12 PROCEDURE — 82570 ASSAY OF URINE CREATININE: CPT

## 2024-12-12 PROCEDURE — 99211 OFF/OP EST MAY X REQ PHY/QHP: CPT

## 2024-12-12 PROCEDURE — 96366 THER/PROPH/DIAG IV INF ADDON: CPT

## 2024-12-12 PROCEDURE — 2580000003 HC RX 258: Performed by: INTERNAL MEDICINE

## 2024-12-12 PROCEDURE — 96365 THER/PROPH/DIAG IV INF INIT: CPT

## 2024-12-12 PROCEDURE — 82043 UR ALBUMIN QUANTITATIVE: CPT

## 2024-12-12 PROCEDURE — 83880 ASSAY OF NATRIURETIC PEPTIDE: CPT

## 2024-12-12 PROCEDURE — 80053 COMPREHEN METABOLIC PANEL: CPT

## 2024-12-12 PROCEDURE — 6360000002 HC RX W HCPCS: Performed by: INTERNAL MEDICINE

## 2024-12-12 PROCEDURE — 84443 ASSAY THYROID STIM HORMONE: CPT

## 2024-12-12 PROCEDURE — 96375 TX/PRO/DX INJ NEW DRUG ADDON: CPT

## 2024-12-12 PROCEDURE — 83036 HEMOGLOBIN GLYCOSYLATED A1C: CPT

## 2024-12-12 RX ORDER — SODIUM CHLORIDE 0.9 % (FLUSH) 0.9 %
5-40 SYRINGE (ML) INJECTION ONCE
Status: CANCELLED
Start: 2024-12-19 | End: 2024-12-19

## 2024-12-12 RX ORDER — FUROSEMIDE 10 MG/ML
120 INJECTION INTRAMUSCULAR; INTRAVENOUS ONCE
Status: CANCELLED
Start: 2024-12-19 | End: 2024-12-19

## 2024-12-12 RX ORDER — FUROSEMIDE 10 MG/ML
120 INJECTION INTRAMUSCULAR; INTRAVENOUS ONCE
Status: COMPLETED | OUTPATIENT
Start: 2024-12-12 | End: 2024-12-12

## 2024-12-12 RX ORDER — SODIUM CHLORIDE 0.9 % (FLUSH) 0.9 %
5-40 SYRINGE (ML) INJECTION ONCE
Status: COMPLETED | OUTPATIENT
Start: 2024-12-12 | End: 2024-12-12

## 2024-12-12 RX ADMIN — Medication 10 ML: at 10:16

## 2024-12-12 RX ADMIN — FUROSEMIDE 20 MG/HR: 10 INJECTION, SOLUTION INTRAMUSCULAR; INTRAVENOUS at 10:22

## 2024-12-12 RX ADMIN — FUROSEMIDE 120 MG: 10 INJECTION, SOLUTION INTRAMUSCULAR; INTRAVENOUS at 10:16

## 2024-12-18 ENCOUNTER — OFFICE VISIT (OUTPATIENT)
Dept: ENDOCRINOLOGY | Age: 54
End: 2024-12-18
Payer: COMMERCIAL

## 2024-12-18 VITALS
BODY MASS INDEX: 34.49 KG/M2 | WEIGHT: 202 LBS | HEIGHT: 64 IN | RESPIRATION RATE: 16 BRPM | DIASTOLIC BLOOD PRESSURE: 73 MMHG | SYSTOLIC BLOOD PRESSURE: 130 MMHG | HEART RATE: 72 BPM

## 2024-12-18 DIAGNOSIS — Z85.3 HISTORY OF BREAST CANCER: ICD-10-CM

## 2024-12-18 DIAGNOSIS — I25.83 CORONARY ARTERY DISEASE DUE TO LIPID RICH PLAQUE: Chronic | ICD-10-CM

## 2024-12-18 DIAGNOSIS — I25.10 CORONARY ARTERY DISEASE DUE TO LIPID RICH PLAQUE: Chronic | ICD-10-CM

## 2024-12-18 DIAGNOSIS — Z79.4 TYPE 2 DIABETES MELLITUS WITH OTHER CIRCULATORY COMPLICATION, WITH LONG-TERM CURRENT USE OF INSULIN (HCC): Primary | ICD-10-CM

## 2024-12-18 DIAGNOSIS — I50.32 CHRONIC HEART FAILURE WITH PRESERVED EJECTION FRACTION (HCC): Chronic | ICD-10-CM

## 2024-12-18 DIAGNOSIS — E11.59 TYPE 2 DIABETES MELLITUS WITH OTHER CIRCULATORY COMPLICATION, WITH LONG-TERM CURRENT USE OF INSULIN (HCC): Primary | ICD-10-CM

## 2024-12-18 DIAGNOSIS — I50.32 CHRONIC DIASTOLIC CONGESTIVE HEART FAILURE (HCC): ICD-10-CM

## 2024-12-18 PROCEDURE — 3052F HG A1C>EQUAL 8.0%<EQUAL 9.0%: CPT | Performed by: INTERNAL MEDICINE

## 2024-12-18 PROCEDURE — G8427 DOCREV CUR MEDS BY ELIG CLIN: HCPCS | Performed by: INTERNAL MEDICINE

## 2024-12-18 PROCEDURE — G8417 CALC BMI ABV UP PARAM F/U: HCPCS | Performed by: INTERNAL MEDICINE

## 2024-12-18 PROCEDURE — 99214 OFFICE O/P EST MOD 30 MIN: CPT | Performed by: INTERNAL MEDICINE

## 2024-12-18 PROCEDURE — 3075F SYST BP GE 130 - 139MM HG: CPT | Performed by: INTERNAL MEDICINE

## 2024-12-18 PROCEDURE — G8484 FLU IMMUNIZE NO ADMIN: HCPCS | Performed by: INTERNAL MEDICINE

## 2024-12-18 PROCEDURE — 1036F TOBACCO NON-USER: CPT | Performed by: INTERNAL MEDICINE

## 2024-12-18 PROCEDURE — 2022F DILAT RTA XM EVC RTNOPTHY: CPT | Performed by: INTERNAL MEDICINE

## 2024-12-18 PROCEDURE — 3078F DIAST BP <80 MM HG: CPT | Performed by: INTERNAL MEDICINE

## 2024-12-18 PROCEDURE — 3017F COLORECTAL CA SCREEN DOC REV: CPT | Performed by: INTERNAL MEDICINE

## 2024-12-18 RX ORDER — TIRZEPATIDE 5 MG/.5ML
INJECTION, SOLUTION SUBCUTANEOUS
Qty: 2 ML | Refills: 1 | Status: SHIPPED | OUTPATIENT
Start: 2024-12-18

## 2024-12-18 RX ORDER — KETOROLAC TROMETHAMINE 30 MG/ML
INJECTION, SOLUTION INTRAMUSCULAR; INTRAVENOUS
Qty: 1 EACH | Refills: 1 | Status: SHIPPED | OUTPATIENT
Start: 2024-12-18

## 2024-12-18 RX ORDER — ACYCLOVIR 800 MG/1
TABLET ORAL
Qty: 2 EACH | Refills: 5 | Status: SHIPPED | OUTPATIENT
Start: 2024-12-18

## 2024-12-18 NOTE — PROGRESS NOTES
extremities (do not wear at night). 20-30mmHg. 1 each 1    Continuous Blood Gluc  (FREESTYLE EMERALD 2 READER) MINDY To check glucose levels 1 each 0    albuterol (PROVENTIL) (2.5 MG/3ML) 0.083% nebulizer solution Take 3 mLs by nebulization every 6 hours as needed for Wheezing or Shortness of Breath 120 each 1    brexpiprazole (REXULTI) 4 MG TABS tablet Take 0.5 tablets by mouth at bedtime      loratadine (CLARITIN) 10 MG tablet TAKE 1 TABLET BY MOUTH ONE TIME A DAY  (Patient taking differently: Take 1 tablet by mouth daily) 30 tablet 5    OXYGEN Inhale 2 L into the lungs continuous Sometimes with activity      oxyCODONE (OXYCONTIN) 20 MG extended release tablet Take 1 tablet by mouth in the morning and 1 tablet in the evening.      aspirin 81 MG EC tablet Take 1 tablet by mouth daily      benztropine (COGENTIN) 2 MG tablet Take 1 tablet by mouth nightly      duloxetine (CYMBALTA) 60 MG capsule Take 1 capsule by mouth 2 times daily       No current facility-administered medications for this visit.     Facility-Administered Medications Ordered in Other Visits   Medication Dose Route Frequency Provider Last Rate Last Admin    sodium chloride flush 0.9 % injection 5-40 mL  5-40 mL IntraVENous Once Jessa Parsons MD        furosemide (LASIX) 100 mg in sodium chloride 0.9 % 100 mL infusion  20 mg/hr IntraVENous Continuous Jessa Parsons MD 20 mL/hr at 24 1021 20 mg/hr at 24 1021     Allergies   Allergen Reactions    Iodides Anaphylaxis     allergic to ct scan dye, not the MRI    Other Anaphylaxis and Other (See Comments)    Trazodone Shortness Of Breath and Other (See Comments)     Fainted, asthma attack, felt sluggish     Family Status   Relation Name Status    Other family h/o Alive    Father      Mother  Alive    Sister  Alive    Brother  Alive   No partnership data on file     OBJECTIVE:  /73   Pulse 72   Resp 16   Ht 1.626 m (5' 4\")   Wt 91.6 kg (202 lb)   BMI 34.67 kg/m²    Wt

## 2024-12-19 ENCOUNTER — HOSPITAL ENCOUNTER (OUTPATIENT)
Dept: ONCOLOGY | Age: 54
Setting detail: INFUSION SERIES
Discharge: HOME | End: 2024-12-19
Payer: COMMERCIAL

## 2024-12-19 ENCOUNTER — TELEPHONE (OUTPATIENT)
Dept: ENDOCRINOLOGY | Age: 54
End: 2024-12-19

## 2024-12-19 VITALS
WEIGHT: 208.8 LBS | DIASTOLIC BLOOD PRESSURE: 77 MMHG | HEART RATE: 76 BPM | TEMPERATURE: 97 F | RESPIRATION RATE: 16 BRPM | SYSTOLIC BLOOD PRESSURE: 130 MMHG | BODY MASS INDEX: 35.84 KG/M2

## 2024-12-19 DIAGNOSIS — I50.32 CHRONIC HEART FAILURE WITH PRESERVED EJECTION FRACTION (HCC): ICD-10-CM

## 2024-12-19 DIAGNOSIS — E11.69 TYPE 2 DIABETES MELLITUS WITH OTHER SPECIFIED COMPLICATION, WITH LONG-TERM CURRENT USE OF INSULIN (HCC): ICD-10-CM

## 2024-12-19 DIAGNOSIS — I50.32 CHRONIC DIASTOLIC CONGESTIVE HEART FAILURE (HCC): Primary | ICD-10-CM

## 2024-12-19 DIAGNOSIS — Z79.4 TYPE 2 DIABETES MELLITUS WITH OTHER SPECIFIED COMPLICATION, WITH LONG-TERM CURRENT USE OF INSULIN (HCC): ICD-10-CM

## 2024-12-19 DIAGNOSIS — R06.02 SHORTNESS OF BREATH: ICD-10-CM

## 2024-12-19 LAB
ANION GAP SERPL CALCULATED.3IONS-SCNC: 11 MMOL/L (ref 3–16)
BUN SERPL-MCNC: 32 MG/DL (ref 7–20)
CALCIUM SERPL-MCNC: 8.8 MG/DL (ref 8.3–10.6)
CHLORIDE SERPL-SCNC: 100 MMOL/L (ref 99–110)
CO2 SERPL-SCNC: 30 MMOL/L (ref 21–32)
CREAT SERPL-MCNC: 1.5 MG/DL (ref 0.6–1.1)
GFR SERPLBLD CREATININE-BSD FMLA CKD-EPI: 41 ML/MIN/{1.73_M2}
GLUCOSE SERPL-MCNC: 211 MG/DL (ref 70–99)
NT-PROBNP SERPL-MCNC: 134 PG/ML (ref 0–124)
POTASSIUM SERPL-SCNC: 4.5 MMOL/L (ref 3.5–5.1)
SODIUM SERPL-SCNC: 141 MMOL/L (ref 136–145)

## 2024-12-19 PROCEDURE — 6360000002 HC RX W HCPCS: Performed by: INTERNAL MEDICINE

## 2024-12-19 PROCEDURE — 96375 TX/PRO/DX INJ NEW DRUG ADDON: CPT

## 2024-12-19 PROCEDURE — 96366 THER/PROPH/DIAG IV INF ADDON: CPT

## 2024-12-19 PROCEDURE — 99211 OFF/OP EST MAY X REQ PHY/QHP: CPT

## 2024-12-19 PROCEDURE — 80048 BASIC METABOLIC PNL TOTAL CA: CPT

## 2024-12-19 PROCEDURE — 83880 ASSAY OF NATRIURETIC PEPTIDE: CPT

## 2024-12-19 PROCEDURE — 96365 THER/PROPH/DIAG IV INF INIT: CPT

## 2024-12-19 PROCEDURE — 2580000003 HC RX 258: Performed by: INTERNAL MEDICINE

## 2024-12-19 RX ORDER — FUROSEMIDE 10 MG/ML
120 INJECTION INTRAMUSCULAR; INTRAVENOUS ONCE
Status: COMPLETED | OUTPATIENT
Start: 2024-12-19 | End: 2024-12-19

## 2024-12-19 RX ORDER — SODIUM CHLORIDE 0.9 % (FLUSH) 0.9 %
5-40 SYRINGE (ML) INJECTION ONCE
Status: CANCELLED
Start: 2024-12-26 | End: 2024-12-26

## 2024-12-19 RX ORDER — SODIUM CHLORIDE 0.9 % (FLUSH) 0.9 %
5-40 SYRINGE (ML) INJECTION ONCE
Status: DISCONTINUED | OUTPATIENT
Start: 2024-12-19 | End: 2024-12-20 | Stop reason: HOSPADM

## 2024-12-19 RX ORDER — FUROSEMIDE 10 MG/ML
120 INJECTION INTRAMUSCULAR; INTRAVENOUS ONCE
Status: CANCELLED
Start: 2024-12-26 | End: 2024-12-26

## 2024-12-19 RX ADMIN — FUROSEMIDE 120 MG: 10 INJECTION, SOLUTION INTRAMUSCULAR; INTRAVENOUS at 10:11

## 2024-12-19 RX ADMIN — FUROSEMIDE 20 MG/HR: 10 INJECTION, SOLUTION INTRAMUSCULAR; INTRAVENOUS at 10:21

## 2024-12-19 NOTE — TELEPHONE ENCOUNTER
Submitted PA for Mounjaro  Via [facility] Key: [id number]    STATUS: Not Sent    Most recent OV note is unsigned. Please send back to PA dept once completed. Thanks!    If this requires a response please respond to the pool ( P MHCX PSC MEDICATION PRE-AUTH).      Thank you please advise patient.

## 2024-12-20 RX ORDER — DULAGLUTIDE 1.5 MG/.5ML
1.5 INJECTION, SOLUTION SUBCUTANEOUS WEEKLY
Qty: 2 ML | Refills: 0 | OUTPATIENT
Start: 2024-12-20

## 2024-12-20 NOTE — TELEPHONE ENCOUNTER
Can we appeal? Patient was on Trulicity with an A1c of 8.5 that increased to 9. Has tried glipizide and glimepiride. Has tried Jardiance.     Patient has multiple complicated health conditions and A1c has increased to 9. She would greatly benefit from Mounjaro.     Thank you!

## 2024-12-20 NOTE — TELEPHONE ENCOUNTER
Submitted PA for Mounjaro  Via [facility] Key: [id number] STATUS: PENDING.    Follow up done daily; if no decision with in three days we will refax.  If another three days goes by with no decision will call the insurance for status.

## 2024-12-20 NOTE — TELEPHONE ENCOUNTER
Denied today by [employer] [employer] 2017  Coverage is provided when the member meets all the followin. Member has a history of at least 120 days of therapy with THREE preferred medications [ONE of the 120 day trials must be Byetta (5 mcg and 10 mcg), Victoza (18 MG/3 ML PEN)(Brand name is preferred by your plan) or Trulicity (0.75 mg, 1.5 mg, 3 mg and 4.5 mg)], which include but are not limited to: Farxiga 5 and 10 mg (Brand name is preferred by your plan), Glimepiride, Glipizide, Invokana 100 mg and 300 mg, Januvia, Jardiance 10 and 25 mg and Metformin IR and ER (generic of Glucophage XR) and, 2. Member has had an inadequate clinical response (the inability to reach A1C goal (less than 7%) after at least 120 days of current regimen, with use of two or more drugs concomitantly per ADA (American Diabetes Association) guidelines and, 3. Member has documented adherence and appropriate dose escalation (must achieve maximum recommended dose or document that maximum recommended dose is not tolerated or is clinically inappropriate) and, 4. Documentation includes a patient specific A1C goal if less than 7% and must include current A1C (within the last 6 months).     If this requires a response please respond to the pool ( P MHCX PSC MEDICATION PRE-AUTH).      Thank you please advise patient.

## 2024-12-26 ENCOUNTER — TELEPHONE (OUTPATIENT)
Dept: INTERNAL MEDICINE CLINIC | Age: 54
End: 2024-12-26

## 2024-12-26 ENCOUNTER — TELEPHONE (OUTPATIENT)
Dept: ENDOCRINOLOGY | Age: 54
End: 2024-12-26

## 2024-12-26 ENCOUNTER — HOSPITAL ENCOUNTER (OUTPATIENT)
Dept: ONCOLOGY | Age: 54
Setting detail: INFUSION SERIES
Discharge: HOME | End: 2024-12-26
Payer: COMMERCIAL

## 2024-12-26 VITALS
BODY MASS INDEX: 34.98 KG/M2 | WEIGHT: 203.8 LBS | HEART RATE: 74 BPM | DIASTOLIC BLOOD PRESSURE: 66 MMHG | RESPIRATION RATE: 16 BRPM | SYSTOLIC BLOOD PRESSURE: 125 MMHG | TEMPERATURE: 97 F

## 2024-12-26 DIAGNOSIS — R06.02 SHORTNESS OF BREATH: ICD-10-CM

## 2024-12-26 DIAGNOSIS — I50.32 CHRONIC DIASTOLIC CONGESTIVE HEART FAILURE (HCC): Primary | ICD-10-CM

## 2024-12-26 DIAGNOSIS — Z79.4 TYPE 2 DIABETES MELLITUS WITH OTHER SPECIFIED COMPLICATION, WITH LONG-TERM CURRENT USE OF INSULIN (HCC): ICD-10-CM

## 2024-12-26 DIAGNOSIS — I10 ESSENTIAL HYPERTENSION: Chronic | ICD-10-CM

## 2024-12-26 DIAGNOSIS — Z09 HOSPITAL DISCHARGE FOLLOW-UP: ICD-10-CM

## 2024-12-26 DIAGNOSIS — N17.9 AKI (ACUTE KIDNEY INJURY) (HCC): ICD-10-CM

## 2024-12-26 DIAGNOSIS — E11.69 TYPE 2 DIABETES MELLITUS WITH OTHER SPECIFIED COMPLICATION, WITH LONG-TERM CURRENT USE OF INSULIN (HCC): Primary | ICD-10-CM

## 2024-12-26 DIAGNOSIS — I50.32 CHRONIC HEART FAILURE WITH PRESERVED EJECTION FRACTION (HCC): ICD-10-CM

## 2024-12-26 DIAGNOSIS — E11.69 TYPE 2 DIABETES MELLITUS WITH OTHER SPECIFIED COMPLICATION, WITH LONG-TERM CURRENT USE OF INSULIN (HCC): ICD-10-CM

## 2024-12-26 DIAGNOSIS — I50.32 CHRONIC DIASTOLIC HEART FAILURE (HCC): ICD-10-CM

## 2024-12-26 DIAGNOSIS — Z79.4 TYPE 2 DIABETES MELLITUS WITH OTHER SPECIFIED COMPLICATION, WITH LONG-TERM CURRENT USE OF INSULIN (HCC): Primary | ICD-10-CM

## 2024-12-26 LAB
ANION GAP SERPL CALCULATED.3IONS-SCNC: 10 MMOL/L (ref 3–16)
BUN SERPL-MCNC: 48 MG/DL (ref 7–20)
CALCIUM SERPL-MCNC: 9 MG/DL (ref 8.3–10.6)
CHLORIDE SERPL-SCNC: 93 MMOL/L (ref 99–110)
CO2 SERPL-SCNC: 30 MMOL/L (ref 21–32)
CREAT SERPL-MCNC: 1.5 MG/DL (ref 0.6–1.1)
GFR SERPLBLD CREATININE-BSD FMLA CKD-EPI: 41 ML/MIN/{1.73_M2}
GLUCOSE BLD-MCNC: 439 MG/DL (ref 70–99)
GLUCOSE SERPL-MCNC: 399 MG/DL (ref 70–99)
NT-PROBNP SERPL-MCNC: 175 PG/ML (ref 0–124)
PERFORMED ON: ABNORMAL
POTASSIUM SERPL-SCNC: 4.3 MMOL/L (ref 3.5–5.1)
SODIUM SERPL-SCNC: 133 MMOL/L (ref 136–145)

## 2024-12-26 PROCEDURE — 96366 THER/PROPH/DIAG IV INF ADDON: CPT

## 2024-12-26 PROCEDURE — 6360000002 HC RX W HCPCS: Performed by: INTERNAL MEDICINE

## 2024-12-26 PROCEDURE — 96374 THER/PROPH/DIAG INJ IV PUSH: CPT

## 2024-12-26 PROCEDURE — 99211 OFF/OP EST MAY X REQ PHY/QHP: CPT

## 2024-12-26 PROCEDURE — 96365 THER/PROPH/DIAG IV INF INIT: CPT

## 2024-12-26 PROCEDURE — 83880 ASSAY OF NATRIURETIC PEPTIDE: CPT

## 2024-12-26 PROCEDURE — 80048 BASIC METABOLIC PNL TOTAL CA: CPT

## 2024-12-26 PROCEDURE — 96375 TX/PRO/DX INJ NEW DRUG ADDON: CPT

## 2024-12-26 RX ORDER — INSULIN DEGLUDEC 200 U/ML
150 INJECTION, SOLUTION SUBCUTANEOUS DAILY
Qty: 3 ADJUSTABLE DOSE PRE-FILLED PEN SYRINGE | Refills: 3 | Status: SHIPPED | OUTPATIENT
Start: 2024-12-26

## 2024-12-26 RX ORDER — INSULIN DEGLUDEC 200 U/ML
150 INJECTION, SOLUTION SUBCUTANEOUS DAILY
Qty: 1 ADJUSTABLE DOSE PRE-FILLED PEN SYRINGE | Refills: 3 | Status: SHIPPED | OUTPATIENT
Start: 2024-12-26 | End: 2024-12-26 | Stop reason: SDUPTHER

## 2024-12-26 RX ORDER — SODIUM CHLORIDE 0.9 % (FLUSH) 0.9 %
5-40 SYRINGE (ML) INJECTION ONCE
Status: DISCONTINUED | OUTPATIENT
Start: 2024-12-26 | End: 2024-12-27 | Stop reason: HOSPADM

## 2024-12-26 RX ORDER — FUROSEMIDE 10 MG/ML
120 INJECTION INTRAMUSCULAR; INTRAVENOUS ONCE
Status: COMPLETED | OUTPATIENT
Start: 2024-12-26 | End: 2024-12-26

## 2024-12-26 RX ORDER — FUROSEMIDE 10 MG/ML
120 INJECTION INTRAMUSCULAR; INTRAVENOUS ONCE
Status: CANCELLED
Start: 2025-01-02 | End: 2025-01-02

## 2024-12-26 RX ORDER — SODIUM CHLORIDE 0.9 % (FLUSH) 0.9 %
5-40 SYRINGE (ML) INJECTION ONCE
Status: CANCELLED
Start: 2025-01-02 | End: 2025-01-02

## 2024-12-26 RX ADMIN — FUROSEMIDE 120 MG: 10 INJECTION, SOLUTION INTRAMUSCULAR; INTRAVENOUS at 11:07

## 2024-12-26 NOTE — PROGRESS NOTES
To clinic for IV lasix. SBAR given to Liz NP. Per NP, RN obtained blood glucose. Per pt she is out of metolazone and mounjaro was not approved through insurance. Pt gave herself 35 units of novolog. Per NP, hold lasix infusion and metolazone. Per NP, pt needs to follow up with her endocrinologist. Pt and pt  voiced understanding.  mg Lasix given over 10 minutes, Pt tolerated well. Gait steady. Pt discharged per wheel chair to car accompanied by .  To return next week for weekly IV labs and lasix.

## 2024-12-26 NOTE — TELEPHONE ENCOUNTER
Call from [facility]    Stating rx for Tresiba is just for 1 pen and there are 3 pens in 1 box. Cannot split box up    [facility] [address] # [phone]

## 2024-12-26 NOTE — TELEPHONE ENCOUNTER
TOBING seen pt in the infusion clinic today.  Her labs were reviewed and it was not felt that pt did not warrant metolazone today before the infusion.    Also, blood sugars in 400 range.    [facility] recommends waiting for ARETHA to approve this script.    Prescription refill:  Requested Prescriptions     Pending Prescriptions Disp Refills    metOLazone (ZAROXOLYN) 5 MG tablet 20 tablet 3     Sig: Take 1 tablet by mouth once a week .Take one tablet 30 Minutes Before Lasix Infusion and one extra tablet for weight over 205       Refill parameters per protocol were met    Last OV:  11/18/24    Future Appt: 2/18/2025     Labs:    Lab Results   Component Value Date     (L) 12/26/2024    K 4.3 12/26/2024    CL 93 (L) 12/26/2024    CO2 30 12/26/2024    BUN 48 (H) 12/26/2024    CREATININE 1.5 (H) 12/26/2024    GLUCOSE 399 (H) 12/26/2024    CALCIUM 9.0 12/26/2024    BILITOT <0.2 12/12/2024    ALKPHOS 102 12/12/2024    AST 21 12/12/2024    ALT 8 (L) 12/12/2024    LABGLOM 41 (A) 12/26/2024    GFRAA 47 (A) 10/13/2022    AGRATIO 1.0 (L) 12/12/2024    GLOB 3.5 08/10/2021       Lab Results   Component Value Date    CHOL 150 12/12/2024    TRIG 391 (H) 12/12/2024    HDL 29 (L) 12/12/2024    LDL see below 12/12/2024    VLDL see below 12/12/2024       Lab Results   Component Value Date    ALT 8 (L) 12/12/2024    AST 21 12/12/2024    ALKPHOS 102 12/12/2024    BILITOT <0.2 12/12/2024

## 2024-12-26 NOTE — TELEPHONE ENCOUNTER
Submitted PA for Lubiprostone 24MCG capsules   Via [facility] (Key: [id number]) STATUS: PENDING.    Follow up done daily; if no decision with in three days we will refax.  If another three days goes by with no decision will call the insurance for status.

## 2024-12-26 NOTE — TELEPHONE ENCOUNTER
Pt's  states the pharmacy will not fill meto      Medication Refill    Medication needing refilled:  metOLazone (ZAROXOLYN)   Dosage of the medication:  5 MG tablet   How are you taking this medication (QD, BID, TID, QID, PRN):  Take 1 tablet by mouth once a week .Take one tablet 30 Minutes Before Lasix Infusion and one extra tablet for weight over 205   30 or 90 day supply called in:    When will you run out of your medication:  Pt is out but she needs one for her infusion today, but the pharmacy will not fill the prescription until the beginning of the month.  Her infusion began at 9:00 today so she will have to go without it unless we have them give her one today from the hospital pharmacy.  Which Pharmacy are we sending the medication to?:  [id number] [address] - P [phone] - F [phone]

## 2024-12-26 NOTE — TELEPHONE ENCOUNTER
Pt  called stating insurance needs a PA on medication       Tirzepatide (MOUNJARO) 5 MG/0.5ML SOAJ  needs PA      Insulin Glargine, 2 Unit Dial, (TOUJEO MAX SOLOSTAR) 300 UNIT/ML SOPN   states this medication is not working for pt asking for a call back       Ha [phone]

## 2024-12-26 NOTE — TELEPHONE ENCOUNTER
The medication is APPROVED THRU 12/24/2025    If this requires a response please respond to the pool ( P MHCX PSC MEDICATION PRE-AUTH).      Thank you please advise patient.

## 2024-12-26 NOTE — TELEPHONE ENCOUNTER
Pt's  stated she took 160 units nightly of tresiba U-200. [facility] [address] for a 90 day supply. Pt's  was made aware to have pt monitor blood sugars and to contact office if blood sugars don't go down, and to go to the er if experiencing symptoms of hypoglycemia. Pt's  verbally expressed understanding. Script pending. Reginaldi.

## 2024-12-26 NOTE — TELEPHONE ENCOUNTER
Called and s/w pt's . Pt's  is stating pt's sugars have been high and that toujeo is not bringing sugar's down. Would like to see if pt can switch back to tresiba.Pt takes 200 units at night. Pt's blood sugars have been running 12/26 499, 12/21 509, 12/20 registered at [facility], 12/19 192. Pt uses [employer] but is not data sharing with office. Pt's  unable to travel to have madai downloaded. Also notified  that a appeal is being done on mounjaro.

## 2024-12-27 ENCOUNTER — TELEPHONE (OUTPATIENT)
Dept: ENDOCRINOLOGY | Age: 54
End: 2024-12-27

## 2024-12-27 RX ORDER — SPIRONOLACTONE 50 MG/1
50 TABLET, FILM COATED ORAL DAILY
Qty: 90 TABLET | Refills: 3 | Status: SHIPPED | OUTPATIENT
Start: 2024-12-27

## 2024-12-27 NOTE — TELEPHONE ENCOUNTER
Submitted PA for Tresiba  Via C[facility] Key: [id number]   STATUS:Your PA request for [id number] was approved for 365 days. The PA# assigned is [id number].     If this requires a response please respond to the pool ( P MHCX PSC MEDICATION PRE-AUTH).      Thank you please advise patient.

## 2024-12-27 NOTE — TELEPHONE ENCOUNTER
Prescription refill:  Requested Prescriptions     Pending Prescriptions Disp Refills    spironolactone (ALDACTONE) 50 MG tablet [Pharmacy Med Name: Spironolactone Oral Tablet 50 MG] 90 tablet 3     Sig: TAKE 1 TABLET BY MOUTH EVERY DAY       Refill parameters per protocol were met    Last OV:  11/18/24    Future Appt: 2/18/25    Labs:    Lab Results   Component Value Date     (L) 12/26/2024    K 4.3 12/26/2024    CL 93 (L) 12/26/2024    CO2 30 12/26/2024    BUN 48 (H) 12/26/2024    CREATININE 1.5 (H) 12/26/2024    GLUCOSE 399 (H) 12/26/2024    CALCIUM 9.0 12/26/2024    BILITOT <0.2 12/12/2024    ALKPHOS 102 12/12/2024    AST 21 12/12/2024    ALT 8 (L) 12/12/2024    LABGLOM 41 (A) 12/26/2024    GFRAA 47 (A) 10/13/2022    AGRATIO 1.0 (L) 12/12/2024    GLOB 3.5 08/10/2021       Lab Results   Component Value Date    CHOL 150 12/12/2024    TRIG 391 (H) 12/12/2024    HDL 29 (L) 12/12/2024    LDL see below 12/12/2024    VLDL see below 12/12/2024       Lab Results   Component Value Date    ALT 8 (L) 12/12/2024    AST 21 12/12/2024    ALKPHOS 102 12/12/2024    BILITOT <0.2 12/12/2024

## 2024-12-30 RX ORDER — METOLAZONE 5 MG/1
5 TABLET ORAL WEEKLY
Qty: 20 TABLET | Refills: 3 | Status: SHIPPED | OUTPATIENT
Start: 2024-12-30

## 2025-01-02 ENCOUNTER — TELEPHONE (OUTPATIENT)
Dept: ENDOCRINOLOGY | Age: 55
End: 2025-01-02

## 2025-01-02 ENCOUNTER — HOSPITAL ENCOUNTER (OUTPATIENT)
Dept: ONCOLOGY | Age: 55
Setting detail: INFUSION SERIES
Discharge: HOME | End: 2025-01-02
Payer: COMMERCIAL

## 2025-01-02 ENCOUNTER — TELEPHONE (OUTPATIENT)
Dept: INTERNAL MEDICINE CLINIC | Age: 55
End: 2025-01-02

## 2025-01-02 VITALS
SYSTOLIC BLOOD PRESSURE: 124 MMHG | BODY MASS INDEX: 35.22 KG/M2 | HEART RATE: 71 BPM | RESPIRATION RATE: 16 BRPM | DIASTOLIC BLOOD PRESSURE: 68 MMHG | WEIGHT: 205.2 LBS | TEMPERATURE: 96.7 F

## 2025-01-02 DIAGNOSIS — E11.69 TYPE 2 DIABETES MELLITUS WITH OTHER SPECIFIED COMPLICATION, WITH LONG-TERM CURRENT USE OF INSULIN (HCC): ICD-10-CM

## 2025-01-02 DIAGNOSIS — I50.32 CHRONIC HEART FAILURE WITH PRESERVED EJECTION FRACTION (HCC): ICD-10-CM

## 2025-01-02 DIAGNOSIS — I50.32 CHRONIC DIASTOLIC CONGESTIVE HEART FAILURE (HCC): Primary | ICD-10-CM

## 2025-01-02 DIAGNOSIS — R06.02 SHORTNESS OF BREATH: ICD-10-CM

## 2025-01-02 DIAGNOSIS — Z79.4 TYPE 2 DIABETES MELLITUS WITH OTHER SPECIFIED COMPLICATION, WITH LONG-TERM CURRENT USE OF INSULIN (HCC): ICD-10-CM

## 2025-01-02 LAB
ANION GAP SERPL CALCULATED.3IONS-SCNC: 11 MMOL/L (ref 3–16)
BUN SERPL-MCNC: 40 MG/DL (ref 7–20)
CALCIUM SERPL-MCNC: 9.3 MG/DL (ref 8.3–10.6)
CHLORIDE SERPL-SCNC: 97 MMOL/L (ref 99–110)
CO2 SERPL-SCNC: 31 MMOL/L (ref 21–32)
CREAT SERPL-MCNC: 1.3 MG/DL (ref 0.6–1.1)
GFR SERPLBLD CREATININE-BSD FMLA CKD-EPI: 48 ML/MIN/{1.73_M2}
GLUCOSE SERPL-MCNC: 98 MG/DL (ref 70–99)
NT-PROBNP SERPL-MCNC: 352 PG/ML (ref 0–124)
POTASSIUM SERPL-SCNC: 3.7 MMOL/L (ref 3.5–5.1)
SODIUM SERPL-SCNC: 139 MMOL/L (ref 136–145)

## 2025-01-02 PROCEDURE — 96366 THER/PROPH/DIAG IV INF ADDON: CPT

## 2025-01-02 PROCEDURE — 99211 OFF/OP EST MAY X REQ PHY/QHP: CPT

## 2025-01-02 PROCEDURE — 83880 ASSAY OF NATRIURETIC PEPTIDE: CPT

## 2025-01-02 PROCEDURE — 2580000003 HC RX 258: Performed by: INTERNAL MEDICINE

## 2025-01-02 PROCEDURE — 96365 THER/PROPH/DIAG IV INF INIT: CPT

## 2025-01-02 PROCEDURE — 96374 THER/PROPH/DIAG INJ IV PUSH: CPT

## 2025-01-02 PROCEDURE — 2500000003 HC RX 250 WO HCPCS: Performed by: INTERNAL MEDICINE

## 2025-01-02 PROCEDURE — 6360000002 HC RX W HCPCS: Performed by: INTERNAL MEDICINE

## 2025-01-02 PROCEDURE — 80048 BASIC METABOLIC PNL TOTAL CA: CPT

## 2025-01-02 RX ORDER — SODIUM CHLORIDE 0.9 % (FLUSH) 0.9 %
5-40 SYRINGE (ML) INJECTION ONCE
Start: 2025-01-09 | End: 2025-01-09

## 2025-01-02 RX ORDER — FUROSEMIDE 10 MG/ML
120 INJECTION INTRAMUSCULAR; INTRAVENOUS ONCE
Start: 2025-01-09 | End: 2025-01-09

## 2025-01-02 RX ORDER — SODIUM CHLORIDE 0.9 % (FLUSH) 0.9 %
5-40 SYRINGE (ML) INJECTION ONCE
Status: COMPLETED | OUTPATIENT
Start: 2025-01-02 | End: 2025-01-02

## 2025-01-02 RX ORDER — FUROSEMIDE 10 MG/ML
120 INJECTION INTRAMUSCULAR; INTRAVENOUS ONCE
Status: COMPLETED | OUTPATIENT
Start: 2025-01-02 | End: 2025-01-02

## 2025-01-02 RX ADMIN — FUROSEMIDE 120 MG: 10 INJECTION, SOLUTION INTRAMUSCULAR; INTRAVENOUS at 10:02

## 2025-01-02 RX ADMIN — Medication 10 ML: at 10:00

## 2025-01-02 RX ADMIN — FUROSEMIDE 20 MG/HR: 10 INJECTION, SOLUTION INTRAMUSCULAR; INTRAVENOUS at 10:09

## 2025-01-02 NOTE — TELEPHONE ENCOUNTER
LMOM for patient to call back and confirm current insulin dose as well as what her blood sugars have been running.

## 2025-01-02 NOTE — TELEPHONE ENCOUNTER
Please advise. Attempted to call patient to see if we can connect to the amy or have the meter brought in for downloading as we do not have any data on the [employer] website. No answer.

## 2025-01-02 NOTE — TELEPHONE ENCOUNTER
Pt's  called in asking about changing the pt's nightly insulin when she wakes up the BS is still too high.  Can you please reach out to discuss the options for the pt.

## 2025-01-02 NOTE — TELEPHONE ENCOUNTER
Spoke with HH and notified that we have received their fax and that Dr Simental will be back in the office on 1/6/25.

## 2025-01-02 NOTE — TELEPHONE ENCOUNTER
Pt called back and said dose is 200 units nightly but does not know name of it    BS have been running 200-350 in the mornings and then will drop to 50. Pt said you should be able to see the log from freestyle.

## 2025-01-02 NOTE — PROGRESS NOTES
mg Lasix given over 6 minutes, followed by Lasix infusion 20 mg/hr over 4 hours completed. Pt tolerated well, voided in bathroom multiple times during infusion. Gait steady. SaO2 99% with O2 at 2l/NC. . Pt discharged per wheel chair to car accompanied by .  To return next week for weekly IV labs and lasix.

## 2025-01-02 NOTE — TELEPHONE ENCOUNTER
[facility] calling to follow up on forms that were faxed over 12/30/24.     [phone] ext [phone] if any questions

## 2025-01-03 RX ORDER — EMPAGLIFLOZIN 10 MG/1
10 TABLET, FILM COATED ORAL DAILY
Qty: 90 TABLET | Refills: 3 | Status: SHIPPED | OUTPATIENT
Start: 2025-01-03

## 2025-01-06 NOTE — TELEPHONE ENCOUNTER
Appeal denied    Denial scanned in media    If this requires a response please respond to the pool ( P MHCX PSC MEDICATION PRE-AUTH).      Thank you please advise patient.

## 2025-01-07 NOTE — TELEPHONE ENCOUNTER
Per Dr Gonzalez-    I attempted to contact this patient multiple times,not answering  Please reach out to patient to confirm current insulin dose - long and fast acting and blood sugar readings to adjust insulin doses    Thank you

## 2025-01-08 ENCOUNTER — OFFICE VISIT (OUTPATIENT)
Dept: INTERNAL MEDICINE CLINIC | Age: 55
End: 2025-01-08

## 2025-01-08 VITALS
DIASTOLIC BLOOD PRESSURE: 72 MMHG | BODY MASS INDEX: 34.66 KG/M2 | OXYGEN SATURATION: 96 % | HEIGHT: 64 IN | HEART RATE: 67 BPM | WEIGHT: 203 LBS | SYSTOLIC BLOOD PRESSURE: 110 MMHG

## 2025-01-08 DIAGNOSIS — I89.0 LYMPHEDEMA OF UPPER EXTREMITY FOLLOWING LYMPHADENECTOMY: ICD-10-CM

## 2025-01-08 DIAGNOSIS — M06.00 SERONEGATIVE RHEUMATOID ARTHRITIS (HCC): ICD-10-CM

## 2025-01-08 DIAGNOSIS — E11.42 DIABETIC POLYNEUROPATHY ASSOCIATED WITH TYPE 2 DIABETES MELLITUS (HCC): ICD-10-CM

## 2025-01-08 DIAGNOSIS — Z79.4 TYPE 2 DIABETES MELLITUS WITH OTHER SPECIFIED COMPLICATION, WITH LONG-TERM CURRENT USE OF INSULIN (HCC): ICD-10-CM

## 2025-01-08 DIAGNOSIS — Z71.89 COMPLEX CARE COORDINATION: ICD-10-CM

## 2025-01-08 DIAGNOSIS — I50.32 CHRONIC DIASTOLIC CONGESTIVE HEART FAILURE (HCC): ICD-10-CM

## 2025-01-08 DIAGNOSIS — F33.2 SEVERE EPISODE OF RECURRENT MAJOR DEPRESSIVE DISORDER, WITHOUT PSYCHOTIC FEATURES (HCC): ICD-10-CM

## 2025-01-08 DIAGNOSIS — R53.83 OTHER FATIGUE: Primary | ICD-10-CM

## 2025-01-08 DIAGNOSIS — J96.11 CHRONIC RESPIRATORY FAILURE WITH HYPOXIA: ICD-10-CM

## 2025-01-08 DIAGNOSIS — R40.0 DROWSINESS: ICD-10-CM

## 2025-01-08 DIAGNOSIS — I50.32 CHRONIC DIASTOLIC HEART FAILURE (HCC): ICD-10-CM

## 2025-01-08 DIAGNOSIS — R68.2 DRY MOUTH: ICD-10-CM

## 2025-01-08 DIAGNOSIS — F41.9 ANXIETY: ICD-10-CM

## 2025-01-08 DIAGNOSIS — E11.59 TYPE 2 DIABETES MELLITUS WITH OTHER CIRCULATORY COMPLICATION, WITH LONG-TERM CURRENT USE OF INSULIN (HCC): ICD-10-CM

## 2025-01-08 DIAGNOSIS — G47.33 OSA (OBSTRUCTIVE SLEEP APNEA): ICD-10-CM

## 2025-01-08 DIAGNOSIS — Z79.4 TYPE 2 DIABETES MELLITUS WITH OTHER CIRCULATORY COMPLICATION, WITH LONG-TERM CURRENT USE OF INSULIN (HCC): ICD-10-CM

## 2025-01-08 DIAGNOSIS — N18.4 CKD (CHRONIC KIDNEY DISEASE) STAGE 4, GFR 15-29 ML/MIN (HCC): ICD-10-CM

## 2025-01-08 DIAGNOSIS — E89.89 LYMPHEDEMA OF UPPER EXTREMITY FOLLOWING LYMPHADENECTOMY: ICD-10-CM

## 2025-01-08 DIAGNOSIS — N18.31 STAGE 3A CHRONIC KIDNEY DISEASE (HCC): ICD-10-CM

## 2025-01-08 DIAGNOSIS — J45.51 SEVERE PERSISTENT ASTHMA WITH ACUTE EXACERBATION: ICD-10-CM

## 2025-01-08 DIAGNOSIS — I10 ESSENTIAL HYPERTENSION: Chronic | ICD-10-CM

## 2025-01-08 DIAGNOSIS — I50.32 CHRONIC HEART FAILURE WITH PRESERVED EJECTION FRACTION (HCC): Chronic | ICD-10-CM

## 2025-01-08 DIAGNOSIS — Z85.3 HISTORY OF BREAST CANCER: ICD-10-CM

## 2025-01-08 DIAGNOSIS — E11.69 TYPE 2 DIABETES MELLITUS WITH OTHER SPECIFIED COMPLICATION, WITH LONG-TERM CURRENT USE OF INSULIN (HCC): ICD-10-CM

## 2025-01-08 LAB
CHP ED QC CHECK: NORMAL
GLUCOSE BLD-MCNC: 143 MG/DL

## 2025-01-08 SDOH — ECONOMIC STABILITY: FOOD INSECURITY: WITHIN THE PAST 12 MONTHS, THE FOOD YOU BOUGHT JUST DIDN'T LAST AND YOU DIDN'T HAVE MONEY TO GET MORE.: NEVER TRUE

## 2025-01-08 SDOH — ECONOMIC STABILITY: FOOD INSECURITY: WITHIN THE PAST 12 MONTHS, YOU WORRIED THAT YOUR FOOD WOULD RUN OUT BEFORE YOU GOT MONEY TO BUY MORE.: NEVER TRUE

## 2025-01-08 ASSESSMENT — PATIENT HEALTH QUESTIONNAIRE - PHQ9
2. FEELING DOWN, DEPRESSED OR HOPELESS: SEVERAL DAYS
9. THOUGHTS THAT YOU WOULD BE BETTER OFF DEAD, OR OF HURTING YOURSELF: SEVERAL DAYS
7. TROUBLE CONCENTRATING ON THINGS, SUCH AS READING THE NEWSPAPER OR WATCHING TELEVISION: NOT AT ALL
6. FEELING BAD ABOUT YOURSELF - OR THAT YOU ARE A FAILURE OR HAVE LET YOURSELF OR YOUR FAMILY DOWN: SEVERAL DAYS
4. FEELING TIRED OR HAVING LITTLE ENERGY: MORE THAN HALF THE DAYS
10. IF YOU CHECKED OFF ANY PROBLEMS, HOW DIFFICULT HAVE THESE PROBLEMS MADE IT FOR YOU TO DO YOUR WORK, TAKE CARE OF THINGS AT HOME, OR GET ALONG WITH OTHER PEOPLE: EXTREMELY DIFFICULT
5. POOR APPETITE OR OVEREATING: NOT AT ALL
1. LITTLE INTEREST OR PLEASURE IN DOING THINGS: SEVERAL DAYS
SUM OF ALL RESPONSES TO PHQ QUESTIONS 1-9: 10
SUM OF ALL RESPONSES TO PHQ QUESTIONS 1-9: 11
8. MOVING OR SPEAKING SO SLOWLY THAT OTHER PEOPLE COULD HAVE NOTICED. OR THE OPPOSITE, BEING SO FIGETY OR RESTLESS THAT YOU HAVE BEEN MOVING AROUND A LOT MORE THAN USUAL: NEARLY EVERY DAY
SUM OF ALL RESPONSES TO PHQ QUESTIONS 1-9: 11
SUM OF ALL RESPONSES TO PHQ9 QUESTIONS 1 & 2: 2
3. TROUBLE FALLING OR STAYING ASLEEP: MORE THAN HALF THE DAYS
SUM OF ALL RESPONSES TO PHQ QUESTIONS 1-9: 11

## 2025-01-08 ASSESSMENT — COLUMBIA-SUICIDE SEVERITY RATING SCALE - C-SSRS
2. HAVE YOU ACTUALLY HAD ANY THOUGHTS OF KILLING YOURSELF?: NO
7. DID THIS OCCUR IN THE LAST THREE MONTHS: NO
1. WITHIN THE PAST MONTH, HAVE YOU WISHED YOU WERE DEAD OR WISHED YOU COULD GO TO SLEEP AND NOT WAKE UP?: YES
6. HAVE YOU EVER DONE ANYTHING, STARTED TO DO ANYTHING, OR PREPARED TO DO ANYTHING TO END YOUR LIFE?: YES

## 2025-01-08 NOTE — TELEPHONE ENCOUNTER
Per , patient is taking 200 units of Tresiba at night and blood sugars drop low during the day. Still taking 30 to 50 units of Novolog with meals. He is going to bring her reader in tomorrow to download.

## 2025-01-08 NOTE — PROGRESS NOTES
times daily) 360 tablet 5    Continuous Blood Gluc Sensor (FREESTYLE EMERALD 2 SENSOR) MISC Change every 14 days 2 each 5    Blood Glucose Monitoring Suppl (ONETOUCH VERIO) w/Device KIT Use to check glucose 4 times daily. 1 kit 0    Compression Stockings MISC by Does not apply route Zippered stockings for daily use on lower extremities (do not wear at night). 20-30mmHg. 1 each 1    Continuous Blood Gluc  (FREESTYLE EMERALD 2 READER) MINDY To check glucose levels 1 each 0    albuterol (PROVENTIL) (2.5 MG/3ML) 0.083% nebulizer solution Take 3 mLs by nebulization every 6 hours as needed for Wheezing or Shortness of Breath 120 each 1    brexpiprazole (REXULTI) 4 MG TABS tablet Take 1 tablet by mouth at bedtime      loratadine (CLARITIN) 10 MG tablet TAKE 1 TABLET BY MOUTH ONE TIME A DAY  (Patient taking differently: Take 1 tablet by mouth daily) 30 tablet 5    OXYGEN Inhale 2 L into the lungs continuous Sometimes with activity      oxyCODONE (OXYCONTIN) 20 MG extended release tablet Take 1 tablet by mouth in the morning and 1 tablet in the evening.      aspirin 81 MG EC tablet Take 1 tablet by mouth daily      benztropine (COGENTIN) 2 MG tablet Take 1 tablet by mouth nightly      duloxetine (CYMBALTA) 60 MG capsule Take 1 capsule by mouth 2 times daily       No facility-administered medications prior to visit.       Patient'spast medical history, surgical history, family history, medications,  and allergies  were all reviewed and updated as appropriate today.    Review of Systems   Constitutional:  Positive for fatigue. Negative for appetite change and fever.   Respiratory:  Positive for shortness of breath. Negative for chest tightness.    Cardiovascular:  Positive for leg swelling. Negative for chest pain.   Gastrointestinal:  Negative for constipation and diarrhea.   Skin:  Negative for rash.   Psychiatric/Behavioral:  Positive for dysphoric mood. Negative for hallucinations. The patient is nervous/anxious.

## 2025-01-09 ENCOUNTER — HOSPITAL ENCOUNTER (OUTPATIENT)
Dept: ONCOLOGY | Age: 55
Setting detail: INFUSION SERIES
Discharge: HOME | End: 2025-01-09
Payer: COMMERCIAL

## 2025-01-09 VITALS
SYSTOLIC BLOOD PRESSURE: 116 MMHG | TEMPERATURE: 97 F | BODY MASS INDEX: 36.29 KG/M2 | HEART RATE: 74 BPM | RESPIRATION RATE: 16 BRPM | WEIGHT: 211.4 LBS | DIASTOLIC BLOOD PRESSURE: 58 MMHG

## 2025-01-09 DIAGNOSIS — Z79.4 TYPE 2 DIABETES MELLITUS WITH OTHER SPECIFIED COMPLICATION, WITH LONG-TERM CURRENT USE OF INSULIN (HCC): ICD-10-CM

## 2025-01-09 DIAGNOSIS — I50.32 CHRONIC DIASTOLIC CONGESTIVE HEART FAILURE (HCC): Primary | ICD-10-CM

## 2025-01-09 DIAGNOSIS — R06.02 SHORTNESS OF BREATH: ICD-10-CM

## 2025-01-09 DIAGNOSIS — I50.32 CHRONIC HEART FAILURE WITH PRESERVED EJECTION FRACTION (HCC): ICD-10-CM

## 2025-01-09 DIAGNOSIS — E11.69 TYPE 2 DIABETES MELLITUS WITH OTHER SPECIFIED COMPLICATION, WITH LONG-TERM CURRENT USE OF INSULIN (HCC): ICD-10-CM

## 2025-01-09 LAB
ANION GAP SERPL CALCULATED.3IONS-SCNC: 10 MMOL/L (ref 3–16)
BUN SERPL-MCNC: 30 MG/DL (ref 7–20)
CALCIUM SERPL-MCNC: 9.5 MG/DL (ref 8.3–10.6)
CHLORIDE SERPL-SCNC: 98 MMOL/L (ref 99–110)
CO2 SERPL-SCNC: 31 MMOL/L (ref 21–32)
CREAT SERPL-MCNC: 1.3 MG/DL (ref 0.6–1.1)
GFR SERPLBLD CREATININE-BSD FMLA CKD-EPI: 48 ML/MIN/{1.73_M2}
GLUCOSE BLD-MCNC: 107 MG/DL (ref 70–99)
GLUCOSE SERPL-MCNC: 175 MG/DL (ref 70–99)
NT-PROBNP SERPL-MCNC: 295 PG/ML (ref 0–124)
PERFORMED ON: ABNORMAL
POTASSIUM SERPL-SCNC: 4.9 MMOL/L (ref 3.5–5.1)
SODIUM SERPL-SCNC: 139 MMOL/L (ref 136–145)

## 2025-01-09 PROCEDURE — 96366 THER/PROPH/DIAG IV INF ADDON: CPT

## 2025-01-09 PROCEDURE — 2580000003 HC RX 258: Performed by: INTERNAL MEDICINE

## 2025-01-09 PROCEDURE — 6360000002 HC RX W HCPCS: Performed by: INTERNAL MEDICINE

## 2025-01-09 PROCEDURE — 96365 THER/PROPH/DIAG IV INF INIT: CPT

## 2025-01-09 PROCEDURE — 96375 TX/PRO/DX INJ NEW DRUG ADDON: CPT

## 2025-01-09 PROCEDURE — 83880 ASSAY OF NATRIURETIC PEPTIDE: CPT

## 2025-01-09 PROCEDURE — 96367 TX/PROPH/DG ADDL SEQ IV INF: CPT

## 2025-01-09 PROCEDURE — 2500000003 HC RX 250 WO HCPCS: Performed by: INTERNAL MEDICINE

## 2025-01-09 PROCEDURE — 96376 TX/PRO/DX INJ SAME DRUG ADON: CPT

## 2025-01-09 PROCEDURE — 99211 OFF/OP EST MAY X REQ PHY/QHP: CPT

## 2025-01-09 PROCEDURE — 80048 BASIC METABOLIC PNL TOTAL CA: CPT

## 2025-01-09 RX ORDER — SODIUM CHLORIDE 0.9 % (FLUSH) 0.9 %
5-40 SYRINGE (ML) INJECTION ONCE
Status: COMPLETED | OUTPATIENT
Start: 2025-01-09 | End: 2025-01-09

## 2025-01-09 RX ORDER — INSULIN DEGLUDEC 200 U/ML
INJECTION, SOLUTION SUBCUTANEOUS
Qty: 6 ADJUSTABLE DOSE PRE-FILLED PEN SYRINGE | Refills: 3 | Status: SHIPPED | OUTPATIENT
Start: 2025-01-09

## 2025-01-09 RX ORDER — INSULIN DEGLUDEC 200 U/ML
INJECTION, SOLUTION SUBCUTANEOUS
Qty: 5 ADJUSTABLE DOSE PRE-FILLED PEN SYRINGE | Refills: 3 | Status: SHIPPED | OUTPATIENT
Start: 2025-01-09 | End: 2025-01-09 | Stop reason: SDUPTHER

## 2025-01-09 RX ORDER — SODIUM CHLORIDE 0.9 % (FLUSH) 0.9 %
5-40 SYRINGE (ML) INJECTION ONCE
Status: CANCELLED
Start: 2025-01-16 | End: 2025-01-16

## 2025-01-09 RX ORDER — FUROSEMIDE 10 MG/ML
120 INJECTION INTRAMUSCULAR; INTRAVENOUS ONCE
Status: CANCELLED
Start: 2025-01-16 | End: 2025-01-16

## 2025-01-09 RX ORDER — FUROSEMIDE 10 MG/ML
120 INJECTION INTRAMUSCULAR; INTRAVENOUS ONCE
Status: COMPLETED | OUTPATIENT
Start: 2025-01-09 | End: 2025-01-09

## 2025-01-09 RX ADMIN — SODIUM CHLORIDE, PRESERVATIVE FREE 10 ML: 5 INJECTION INTRAVENOUS at 11:18

## 2025-01-09 RX ADMIN — FUROSEMIDE 120 MG: 10 INJECTION, SOLUTION INTRAMUSCULAR; INTRAVENOUS at 11:18

## 2025-01-09 RX ADMIN — FUROSEMIDE 20 MG/HR: 10 INJECTION, SOLUTION INTRAMUSCULAR; INTRAVENOUS at 11:27

## 2025-01-09 NOTE — TELEPHONE ENCOUNTER
Call from [facility] on [address]  Stating rx sent over for Tresiba Flextouch comes 3 pens  in a box and rx was sent over for 5 pens . They are asking if rx can be changed to 6 pre filled pens, since they cannot waste? Please send updated rx for 6 pens    [facility] - [phone]

## 2025-01-09 NOTE — TELEPHONE ENCOUNTER
Diabetes Continuous Glucose Monitoring Report         Reason for Study:     - improve diabetic control without risk of hypoglycemia       Current Medication regimen:   Basal Bolus insulin regimen      CGMS Report     CGMS data collection was performed on 1/9/2025  Patient provided information on her  diet, activities and insulin dosing  during this period.   Data was available for 7+ days     Sensor Data Report:   - 12 AM to 6 AM: Overnight blood glucose pattern shows stable glycemia  - 6   AM to 10 AM:  Post breakfast hyperglycemia is noted  --10AM to 5 PM : No hypoglycemia observed during this time.  - 5   PM to 8 PM: Post meal significant hyperglycemia is noted       Average reading 176 mg/dL     Standard Dev/coefficient of variation   40.5  % of time <70 mg/dL 2% %   % of time >180  mg/dL   46%   % of time within range 50 to %  Number of hypoglycemia episodes noted: 0     Impression:   CGMS shows stable glycemia overnight with postprandial hyperglycemia     Recommendation:      Patient was advised to make the following changes in her diabetic regimen  Take dinner boluses 10 minutes I had  Take meal boluses 10 minutes prior to eating

## 2025-01-10 PROBLEM — R60.0 LOCALIZED EDEMA: Status: RESOLVED | Noted: 2019-07-10 | Resolved: 2025-01-10

## 2025-01-10 PROBLEM — B35.6 TINEA CRURIS: Status: RESOLVED | Noted: 2018-07-03 | Resolved: 2025-01-10

## 2025-01-10 PROBLEM — R33.9 URINE RETENTION: Status: RESOLVED | Noted: 2024-04-08 | Resolved: 2025-01-10

## 2025-01-10 ASSESSMENT — ENCOUNTER SYMPTOMS
DIARRHEA: 0
SHORTNESS OF BREATH: 1
CONSTIPATION: 0
CHEST TIGHTNESS: 0

## 2025-01-10 NOTE — ASSESSMENT & PLAN NOTE
Progressive.  Continues to get weekly Lasix infusions.  Next infusion is due tomorrow.  Still better following weekly Lasix infusions and then feels progressively worse throughout the week.  Continues to follow with Dr. Parsons for cardiology.  Check BMP.

## 2025-01-10 NOTE — ASSESSMENT & PLAN NOTE
Blood pressure is well-controlled.  She remains on metoprolol 25 mg twice daily, Demadex 100 mg am/50 mg pm and spironolactone 50 mg daily.

## 2025-01-10 NOTE — ASSESSMENT & PLAN NOTE
Follows with psychiatry.  Depression has been worsening.  Rexulti was recently increased from 2 mg to 4 mg nightly.  Remains on Cymbalta 60 mg daily and Valium 5 mg twice daily as needed.

## 2025-01-10 NOTE — ASSESSMENT & PLAN NOTE
Follows with with Dr. Taylor endocrinology.  Recently changed from Levemir to Tresiba.  Tresiba is working better and she is actually having low sugars.  Currently taking Tresiba 200 mg at at bedtime and I have instructed patient's  to reach out to Dr Taylor's.  To discuss adjusting the dose.  In addition to Tresiba, she remains on Mounjaro 5 mg weekly, Jardiance 10 mg daily, and NovoLog sliding scale-30 to 50 units with meals.

## 2025-01-10 NOTE — ASSESSMENT & PLAN NOTE
Remains on nocturnal oxygen.  No longer has her CPAP as she does not wear it as she does not tolerate it.  I am concerned that patient's somnolence is related to higher levels of carbon dioxide.  I have ordered a BMP but she will have that drawn tomorrow at the [facility] center.

## 2025-01-10 NOTE — ASSESSMENT & PLAN NOTE
Worse currently.  Does follow with psychiatry.  Patient's  states that he does not fear she is a threat to herself.  States that he and her son keep a close eye on her.  Advised patient  to reach out to patient's psychiatrist if her symptoms worsen.  Remains on Cymbalta 60 mg daily and Rexulti 4 mg daily.

## 2025-01-10 NOTE — ASSESSMENT & PLAN NOTE
Worse currently.  Unable to fit into a lymphedema garment because of the amount of swelling in her left arm.  Lymphedema therapy has been ineffective.  There is a clinic in [place], [place] that apparently does surgical treatment for lymphedema but her insurance has denied coverage of this treatment.  Offered referral to Dr. New which patient declines.

## 2025-01-13 ENCOUNTER — TELEPHONE (OUTPATIENT)
Dept: ENDOCRINOLOGY | Age: 55
End: 2025-01-13

## 2025-01-13 DIAGNOSIS — E11.59 TYPE 2 DIABETES MELLITUS WITH OTHER CIRCULATORY COMPLICATION, WITH LONG-TERM CURRENT USE OF INSULIN (HCC): ICD-10-CM

## 2025-01-13 DIAGNOSIS — Z79.4 TYPE 2 DIABETES MELLITUS WITH OTHER SPECIFIED COMPLICATION, WITH LONG-TERM CURRENT USE OF INSULIN (HCC): ICD-10-CM

## 2025-01-13 DIAGNOSIS — Z79.4 TYPE 2 DIABETES MELLITUS WITH OTHER CIRCULATORY COMPLICATION, WITH LONG-TERM CURRENT USE OF INSULIN (HCC): ICD-10-CM

## 2025-01-13 DIAGNOSIS — E11.69 TYPE 2 DIABETES MELLITUS WITH OTHER SPECIFIED COMPLICATION, WITH LONG-TERM CURRENT USE OF INSULIN (HCC): ICD-10-CM

## 2025-01-13 NOTE — TELEPHONE ENCOUNTER
Pt spouse called in and said past 2 days pt BS has been 68 all day. Given Tresiba 180 U and Novolog 30-50 U. Pt denies being sick or on any other medications including steroids. Pt is confused. Advised anything 60 or below to go to ER. Pt spouse understood. Please advise.

## 2025-01-13 NOTE — TELEPHONE ENCOUNTER
Please call patient and advised to reduce the insulin that is the long-acting insulin to half of what they are currently taking.  And let us know how her blood sugars are doing

## 2025-01-13 NOTE — TELEPHONE ENCOUNTER
Please advise patient to stay off of insulin and tell her that I have sent the prescription for glucagon  What her current blood sugars  Can we download her freestyle madai again?  Also are these numbers reported on the freestyle madai or actual fingerstick glucose  Please advise  that anytime they see a low glucose on freestyle madai they have to double check with the fingerstick glucose.

## 2025-01-13 NOTE — TELEPHONE ENCOUNTER
Patient's  states that the patient has not had any insulin in 2 days. Insulin doses that were given in previous message are her usual doses but they have not given her any in 2 days due to low blood sugars. Please advise if there are any further instructions for patient.  can be reached at [phone] if they need to be contacted before tomorrow morning.  is also asking for a refill of glucagon injects to have on hand.

## 2025-01-13 NOTE — TELEPHONE ENCOUNTER
Peer to Peer scheduled for this Friday 1/17/25 between 10 and noon. They will be calling your mobile number.

## 2025-01-16 ENCOUNTER — HOSPITAL ENCOUNTER (OUTPATIENT)
Dept: ONCOLOGY | Age: 55
Setting detail: INFUSION SERIES
Discharge: HOME | End: 2025-01-16
Payer: COMMERCIAL

## 2025-01-16 VITALS
TEMPERATURE: 97 F | DIASTOLIC BLOOD PRESSURE: 71 MMHG | BODY MASS INDEX: 35.38 KG/M2 | WEIGHT: 206.1 LBS | SYSTOLIC BLOOD PRESSURE: 127 MMHG | RESPIRATION RATE: 18 BRPM | HEART RATE: 79 BPM

## 2025-01-16 DIAGNOSIS — I50.32 CHRONIC DIASTOLIC CONGESTIVE HEART FAILURE (HCC): Primary | ICD-10-CM

## 2025-01-16 DIAGNOSIS — E11.59 TYPE 2 DIABETES MELLITUS WITH OTHER CIRCULATORY COMPLICATION, WITH LONG-TERM CURRENT USE OF INSULIN (HCC): Primary | ICD-10-CM

## 2025-01-16 DIAGNOSIS — Z79.4 TYPE 2 DIABETES MELLITUS WITH OTHER CIRCULATORY COMPLICATION, WITH LONG-TERM CURRENT USE OF INSULIN (HCC): Primary | ICD-10-CM

## 2025-01-16 DIAGNOSIS — R06.02 SHORTNESS OF BREATH: ICD-10-CM

## 2025-01-16 DIAGNOSIS — I50.32 CHRONIC HEART FAILURE WITH PRESERVED EJECTION FRACTION (HCC): ICD-10-CM

## 2025-01-16 LAB
ANION GAP SERPL CALCULATED.3IONS-SCNC: 9 MMOL/L (ref 3–16)
BUN SERPL-MCNC: 26 MG/DL (ref 7–20)
CALCIUM SERPL-MCNC: 9 MG/DL (ref 8.3–10.6)
CHLORIDE SERPL-SCNC: 97 MMOL/L (ref 99–110)
CO2 SERPL-SCNC: 35 MMOL/L (ref 21–32)
CREAT SERPL-MCNC: 1.3 MG/DL (ref 0.6–1.1)
GFR SERPLBLD CREATININE-BSD FMLA CKD-EPI: 48 ML/MIN/{1.73_M2}
GLUCOSE SERPL-MCNC: 286 MG/DL (ref 70–99)
NT-PROBNP SERPL-MCNC: 266 PG/ML (ref 0–124)
POTASSIUM SERPL-SCNC: 4.4 MMOL/L (ref 3.5–5.1)
SODIUM SERPL-SCNC: 141 MMOL/L (ref 136–145)

## 2025-01-16 PROCEDURE — 2580000003 HC RX 258: Performed by: INTERNAL MEDICINE

## 2025-01-16 PROCEDURE — 80048 BASIC METABOLIC PNL TOTAL CA: CPT

## 2025-01-16 PROCEDURE — 6360000002 HC RX W HCPCS: Performed by: INTERNAL MEDICINE

## 2025-01-16 PROCEDURE — 99211 OFF/OP EST MAY X REQ PHY/QHP: CPT

## 2025-01-16 PROCEDURE — 83880 ASSAY OF NATRIURETIC PEPTIDE: CPT

## 2025-01-16 PROCEDURE — 2500000003 HC RX 250 WO HCPCS: Performed by: INTERNAL MEDICINE

## 2025-01-16 PROCEDURE — 96375 TX/PRO/DX INJ NEW DRUG ADDON: CPT

## 2025-01-16 PROCEDURE — 96365 THER/PROPH/DIAG IV INF INIT: CPT

## 2025-01-16 PROCEDURE — 96367 TX/PROPH/DG ADDL SEQ IV INF: CPT

## 2025-01-16 PROCEDURE — 96366 THER/PROPH/DIAG IV INF ADDON: CPT

## 2025-01-16 RX ORDER — FUROSEMIDE 10 MG/ML
120 INJECTION INTRAMUSCULAR; INTRAVENOUS ONCE
Status: COMPLETED | OUTPATIENT
Start: 2025-01-16 | End: 2025-01-16

## 2025-01-16 RX ORDER — SODIUM CHLORIDE 0.9 % (FLUSH) 0.9 %
5-40 SYRINGE (ML) INJECTION ONCE
Status: CANCELLED
Start: 2025-01-23 | End: 2025-01-23

## 2025-01-16 RX ORDER — SODIUM CHLORIDE 0.9 % (FLUSH) 0.9 %
5-40 SYRINGE (ML) INJECTION ONCE
Status: COMPLETED | OUTPATIENT
Start: 2025-01-16 | End: 2025-01-16

## 2025-01-16 RX ORDER — FUROSEMIDE 10 MG/ML
120 INJECTION INTRAMUSCULAR; INTRAVENOUS ONCE
Status: CANCELLED
Start: 2025-01-23 | End: 2025-01-23

## 2025-01-16 RX ORDER — LANCETS 28 GAUGE
EACH MISCELLANEOUS
Qty: 100 EACH | Refills: 0 | Status: SHIPPED | OUTPATIENT
Start: 2025-01-16

## 2025-01-16 RX ADMIN — Medication 10 ML: at 09:23

## 2025-01-16 RX ADMIN — FUROSEMIDE 120 MG: 10 INJECTION, SOLUTION INTRAMUSCULAR; INTRAVENOUS at 09:59

## 2025-01-16 RX ADMIN — FUROSEMIDE 20 MG/HR: 10 INJECTION, SOLUTION INTRAMUSCULAR; INTRAVENOUS at 10:04

## 2025-01-16 NOTE — TELEPHONE ENCOUNTER
Please advise patient that I reviewed her glucose log she is still having some low glucose every now and then, I would like to know that she is checking a fingerstick glucose to confirm the low glucose and also if currently she is taking any insulin because seems like she might be taking short acting insulin at this time I would want her to not take any insulin and if she still is having low then we can consider other options  Is she taking Trulicity?  Seems like that is helping her a lot

## 2025-01-16 NOTE — TELEPHONE ENCOUNTER
Spoke to patient's  and he states she has not had any insulin for several days. They do finger sticks to double check the CGM and number correlate. Patient is symptomatic with lows- fatigue, confusion. He states when this happens they give her juice or a snack and levels come back up.     Please advise.

## 2025-01-17 NOTE — TELEPHONE ENCOUNTER
Is she taking glipizide ? If so she stops that   What other pills she is taking for diabetes she needs to stop those

## 2025-01-17 NOTE — TELEPHONE ENCOUNTER
Per patient's  patient has already stopped glipized and Trulicity. She takes nothing for diabetes currently. She does take Jardiance daily for her heart.

## 2025-01-23 ENCOUNTER — HOSPITAL ENCOUNTER (OUTPATIENT)
Dept: ONCOLOGY | Age: 55
Setting detail: INFUSION SERIES
Discharge: HOME | End: 2025-01-23
Payer: COMMERCIAL

## 2025-01-23 VITALS
HEART RATE: 70 BPM | TEMPERATURE: 96.9 F | WEIGHT: 202.6 LBS | SYSTOLIC BLOOD PRESSURE: 104 MMHG | BODY MASS INDEX: 34.78 KG/M2 | DIASTOLIC BLOOD PRESSURE: 66 MMHG | RESPIRATION RATE: 20 BRPM

## 2025-01-23 DIAGNOSIS — R06.02 SHORTNESS OF BREATH: ICD-10-CM

## 2025-01-23 DIAGNOSIS — I50.32 CHRONIC DIASTOLIC CONGESTIVE HEART FAILURE (HCC): Primary | ICD-10-CM

## 2025-01-23 DIAGNOSIS — I50.32 CHRONIC HEART FAILURE WITH PRESERVED EJECTION FRACTION (HCC): ICD-10-CM

## 2025-01-23 LAB
ANION GAP SERPL CALCULATED.3IONS-SCNC: 11 MMOL/L (ref 3–16)
BUN SERPL-MCNC: 45 MG/DL (ref 7–20)
CALCIUM SERPL-MCNC: 9.6 MG/DL (ref 8.3–10.6)
CHLORIDE SERPL-SCNC: 96 MMOL/L (ref 99–110)
CO2 SERPL-SCNC: 33 MMOL/L (ref 21–32)
CREAT SERPL-MCNC: 1.4 MG/DL (ref 0.6–1.1)
GFR SERPLBLD CREATININE-BSD FMLA CKD-EPI: 44 ML/MIN/{1.73_M2}
GLUCOSE SERPL-MCNC: 337 MG/DL (ref 70–99)
NT-PROBNP SERPL-MCNC: 209 PG/ML (ref 0–124)
POTASSIUM SERPL-SCNC: 4.9 MMOL/L (ref 3.5–5.1)
SODIUM SERPL-SCNC: 140 MMOL/L (ref 136–145)

## 2025-01-23 PROCEDURE — 99211 OFF/OP EST MAY X REQ PHY/QHP: CPT

## 2025-01-23 PROCEDURE — 6360000002 HC RX W HCPCS: Performed by: INTERNAL MEDICINE

## 2025-01-23 PROCEDURE — 96367 TX/PROPH/DG ADDL SEQ IV INF: CPT

## 2025-01-23 PROCEDURE — 2580000003 HC RX 258: Performed by: INTERNAL MEDICINE

## 2025-01-23 PROCEDURE — 83880 ASSAY OF NATRIURETIC PEPTIDE: CPT

## 2025-01-23 PROCEDURE — 96376 TX/PRO/DX INJ SAME DRUG ADON: CPT

## 2025-01-23 PROCEDURE — 96365 THER/PROPH/DIAG IV INF INIT: CPT

## 2025-01-23 PROCEDURE — 80048 BASIC METABOLIC PNL TOTAL CA: CPT

## 2025-01-23 PROCEDURE — 2500000003 HC RX 250 WO HCPCS: Performed by: INTERNAL MEDICINE

## 2025-01-23 PROCEDURE — 96366 THER/PROPH/DIAG IV INF ADDON: CPT

## 2025-01-23 RX ORDER — SODIUM CHLORIDE 0.9 % (FLUSH) 0.9 %
5-40 SYRINGE (ML) INJECTION ONCE
Status: CANCELLED
Start: 2025-01-30 | End: 2025-01-30

## 2025-01-23 RX ORDER — SODIUM CHLORIDE 0.9 % (FLUSH) 0.9 %
5-40 SYRINGE (ML) INJECTION ONCE
Status: COMPLETED | OUTPATIENT
Start: 2025-01-23 | End: 2025-01-23

## 2025-01-23 RX ORDER — FUROSEMIDE 10 MG/ML
120 INJECTION INTRAMUSCULAR; INTRAVENOUS ONCE
Status: CANCELLED
Start: 2025-01-30 | End: 2025-01-30

## 2025-01-23 RX ORDER — FUROSEMIDE 10 MG/ML
120 INJECTION INTRAMUSCULAR; INTRAVENOUS ONCE
Status: COMPLETED | OUTPATIENT
Start: 2025-01-23 | End: 2025-01-23

## 2025-01-23 RX ADMIN — Medication 10 ML: at 10:10

## 2025-01-23 RX ADMIN — FUROSEMIDE 20 MG/HR: 10 INJECTION, SOLUTION INTRAMUSCULAR; INTRAVENOUS at 10:19

## 2025-01-23 RX ADMIN — FUROSEMIDE 120 MG: 10 INJECTION, SOLUTION INTRAMUSCULAR; INTRAVENOUS at 10:13

## 2025-01-23 NOTE — PROGRESS NOTES
mg Lasix given over 6 minutes, followed by Lasix infusion 20 mg/hr over 4 hours completed, see MAR. Pt tolerated well, voided in bathroom multiple times during infusion. Gait steady. SaO2 99% with O2 at 2l/NC. . Pt discharged per wheel chair to car accompanied by .  To return next week for weekly IV labs and lasix.

## 2025-01-24 ENCOUNTER — TELEPHONE (OUTPATIENT)
Dept: ENDOCRINOLOGY | Age: 55
End: 2025-01-24

## 2025-01-24 RX ORDER — NYSTATIN 100000 [USP'U]/ML
SUSPENSION ORAL
Qty: 140 ML | Refills: 0 | Status: SHIPPED | OUTPATIENT
Start: 2025-01-24

## 2025-01-24 NOTE — TELEPHONE ENCOUNTER
LMOM informing that (a) sensor is sometime inaccurate the first 12 to 24 hours after placement; (b) false low readings an occur if the patient is laying on the sensor as it cuts off blood flow to the insertion area; (c) if she is truly getting inaccurate readings and none of the above are a factor please call [facility] (madai ) and report faulty sensor, they will replace it. She will need to do finger sticks to test until new sensor arrives.

## 2025-01-24 NOTE — TELEPHONE ENCOUNTER
Pt  called stating pt's new sensor is giving the wrong readings asking fro a call back       [phone]

## 2025-01-29 ENCOUNTER — TELEPHONE (OUTPATIENT)
Dept: PHARMACY | Facility: CLINIC | Age: 55
End: 2025-01-29

## 2025-01-29 NOTE — TELEPHONE ENCOUNTER
CLINICAL PHARMACY NOTE - Population [facility] Pharmacy Referral    Patient can be scheduled with:  Ruben Ford (Andy)-  [facility] Providers: Soraida Ferrera, Alfonso Tenorio, Clementina Ortega, Mariana Simental, Checo Carmen    Received a referral from: Provider: Dr. Simental  to discuss patient’s medications. Called patient's   to schedule a time to speak with a pharmacist over the telephone. Informed  we can schedule with  if needed.    No answer left VM: Please contact us at  [phone] option 1 to schedule this appointment.    Dimple Cartagena CPhT.   [facility] Clinical   [facility]  Toll free: [phone] Option 1

## 2025-01-30 ENCOUNTER — HOSPITAL ENCOUNTER (OUTPATIENT)
Dept: ONCOLOGY | Age: 55
Setting detail: INFUSION SERIES
Discharge: HOME | End: 2025-01-30
Payer: COMMERCIAL

## 2025-01-30 VITALS
WEIGHT: 205.9 LBS | TEMPERATURE: 97.3 F | BODY MASS INDEX: 35.34 KG/M2 | DIASTOLIC BLOOD PRESSURE: 77 MMHG | RESPIRATION RATE: 18 BRPM | SYSTOLIC BLOOD PRESSURE: 130 MMHG | HEART RATE: 74 BPM

## 2025-01-30 DIAGNOSIS — I50.32 CHRONIC DIASTOLIC CONGESTIVE HEART FAILURE (HCC): Primary | ICD-10-CM

## 2025-01-30 DIAGNOSIS — I50.32 CHRONIC HEART FAILURE WITH PRESERVED EJECTION FRACTION (HCC): ICD-10-CM

## 2025-01-30 DIAGNOSIS — R06.02 SHORTNESS OF BREATH: ICD-10-CM

## 2025-01-30 LAB
ANION GAP SERPL CALCULATED.3IONS-SCNC: 8 MMOL/L (ref 3–16)
BUN SERPL-MCNC: 34 MG/DL (ref 7–20)
CALCIUM SERPL-MCNC: 9.3 MG/DL (ref 8.3–10.6)
CHLORIDE SERPL-SCNC: 97 MMOL/L (ref 99–110)
CO2 SERPL-SCNC: 33 MMOL/L (ref 21–32)
CREAT SERPL-MCNC: 1.4 MG/DL (ref 0.6–1.1)
GFR SERPLBLD CREATININE-BSD FMLA CKD-EPI: 44 ML/MIN/{1.73_M2}
GLUCOSE SERPL-MCNC: 106 MG/DL (ref 70–99)
NT-PROBNP SERPL-MCNC: 214 PG/ML (ref 0–124)
POTASSIUM SERPL-SCNC: 4.6 MMOL/L (ref 3.5–5.1)
SODIUM SERPL-SCNC: 138 MMOL/L (ref 136–145)

## 2025-01-30 PROCEDURE — 80048 BASIC METABOLIC PNL TOTAL CA: CPT

## 2025-01-30 PROCEDURE — 2580000003 HC RX 258: Performed by: INTERNAL MEDICINE

## 2025-01-30 PROCEDURE — 2500000003 HC RX 250 WO HCPCS: Performed by: INTERNAL MEDICINE

## 2025-01-30 PROCEDURE — 6360000002 HC RX W HCPCS: Performed by: INTERNAL MEDICINE

## 2025-01-30 PROCEDURE — 99211 OFF/OP EST MAY X REQ PHY/QHP: CPT

## 2025-01-30 PROCEDURE — 96365 THER/PROPH/DIAG IV INF INIT: CPT

## 2025-01-30 PROCEDURE — 96366 THER/PROPH/DIAG IV INF ADDON: CPT

## 2025-01-30 PROCEDURE — 96375 TX/PRO/DX INJ NEW DRUG ADDON: CPT

## 2025-01-30 PROCEDURE — 83880 ASSAY OF NATRIURETIC PEPTIDE: CPT

## 2025-01-30 RX ORDER — SODIUM CHLORIDE 0.9 % (FLUSH) 0.9 %
5-40 SYRINGE (ML) INJECTION ONCE
Start: 2025-02-06 | End: 2025-02-06

## 2025-01-30 RX ORDER — FOLIC ACID 1 MG/1
TABLET ORAL
Qty: 90 TABLET | Refills: 0 | Status: SHIPPED | OUTPATIENT
Start: 2025-01-30

## 2025-01-30 RX ORDER — SODIUM CHLORIDE 0.9 % (FLUSH) 0.9 %
5-40 SYRINGE (ML) INJECTION ONCE
Status: COMPLETED | OUTPATIENT
Start: 2025-01-30 | End: 2025-01-30

## 2025-01-30 RX ORDER — FUROSEMIDE 10 MG/ML
120 INJECTION INTRAMUSCULAR; INTRAVENOUS ONCE
Start: 2025-02-06 | End: 2025-02-06

## 2025-01-30 RX ORDER — FUROSEMIDE 10 MG/ML
120 INJECTION INTRAMUSCULAR; INTRAVENOUS ONCE
Status: COMPLETED | OUTPATIENT
Start: 2025-01-30 | End: 2025-01-30

## 2025-01-30 RX ADMIN — FUROSEMIDE 20 MG/HR: 10 INJECTION, SOLUTION INTRAMUSCULAR; INTRAVENOUS at 10:08

## 2025-01-30 RX ADMIN — FUROSEMIDE 120 MG: 10 INJECTION, SOLUTION INTRAMUSCULAR; INTRAVENOUS at 10:02

## 2025-01-30 RX ADMIN — Medication 10 ML: at 10:00

## 2025-01-30 NOTE — PROGRESS NOTES
Pt to Dept for Lasix IVP and Lasix gtt as ordered from Dr. Parsons. Weight obtained and Labs drawn this AM. Results reviewed per Nhung Fernandez CNP ,Ok to proceed with Tx as ordered today. Pt took Metolazone dose this morning.  Lasix 120mg given IVP over 6 minutes followed by Lasix gtt started at 20mg/hr for 4 hours. Pt scheduled to return next Thursday for same treatment. Pt discharged home at this time per W/C with Home O2 with .

## 2025-01-30 NOTE — TELEPHONE ENCOUNTER
Last OV: 1/8/2025  Next OV: 3/10/2025    Next appointment due:around 3/8/2025     Last fill:9/4/24  Refills:0

## 2025-02-04 ENCOUNTER — TELEPHONE (OUTPATIENT)
Dept: INTERNAL MEDICINE CLINIC | Age: 55
End: 2025-02-04

## 2025-02-04 DIAGNOSIS — I50.32 CHRONIC DIASTOLIC CONGESTIVE HEART FAILURE (HCC): ICD-10-CM

## 2025-02-04 DIAGNOSIS — M06.00 SERONEGATIVE RHEUMATOID ARTHRITIS (HCC): ICD-10-CM

## 2025-02-04 DIAGNOSIS — I50.32 CHRONIC DIASTOLIC HEART FAILURE (HCC): Primary | ICD-10-CM

## 2025-02-04 RX ORDER — ACYCLOVIR 800 MG/1
TABLET ORAL
Qty: 2 EACH | Refills: 2 | Status: SHIPPED | OUTPATIENT
Start: 2025-02-04

## 2025-02-04 NOTE — TELEPHONE ENCOUNTER
Medication:   Requested Prescriptions      No prescriptions requested or ordered in this encounter       Last Filled:      Patient Phone Number: [phone] (home)     Last appt: 12/18/2024   Next appt: 4/22/2025    Last Labs DM:   Lab Results   Component Value Date/Time    LABA1C 9.0 12/12/2024 02:17 PM

## 2025-02-04 NOTE — TELEPHONE ENCOUNTER
Patient called requesting a refill     Rx- Freestyle madai 3 sensor     Pharmacy- [id number]    LOV-  NOV- 4/22/25    Please advise

## 2025-02-04 NOTE — TELEPHONE ENCOUNTER
Ritaa--Medicaid Case Management calling--asking for last history/physical--office note-med list for this pt---also asking for an order for smalll grab bar--raised toliet seat-and PT evaluation----[facility]---the casemanager is Helene Hernández can be reached at [phone]  fax is [phone].  Thanks

## 2025-02-06 ENCOUNTER — HOSPITAL ENCOUNTER (OUTPATIENT)
Dept: ONCOLOGY | Age: 55
Setting detail: INFUSION SERIES
Discharge: HOME | End: 2025-02-06
Payer: COMMERCIAL

## 2025-02-06 VITALS
HEART RATE: 70 BPM | RESPIRATION RATE: 16 BRPM | DIASTOLIC BLOOD PRESSURE: 64 MMHG | BODY MASS INDEX: 35.55 KG/M2 | WEIGHT: 207.1 LBS | TEMPERATURE: 97 F | SYSTOLIC BLOOD PRESSURE: 111 MMHG

## 2025-02-06 DIAGNOSIS — R06.02 SHORTNESS OF BREATH: ICD-10-CM

## 2025-02-06 DIAGNOSIS — I50.32 CHRONIC DIASTOLIC CONGESTIVE HEART FAILURE (HCC): Primary | ICD-10-CM

## 2025-02-06 DIAGNOSIS — I50.32 CHRONIC HEART FAILURE WITH PRESERVED EJECTION FRACTION (HCC): ICD-10-CM

## 2025-02-06 LAB
ANION GAP SERPL CALCULATED.3IONS-SCNC: 16 MMOL/L (ref 3–16)
BUN SERPL-MCNC: 31 MG/DL (ref 7–20)
CALCIUM SERPL-MCNC: 9.2 MG/DL (ref 8.3–10.6)
CHLORIDE SERPL-SCNC: 100 MMOL/L (ref 99–110)
CO2 SERPL-SCNC: 23 MMOL/L (ref 21–32)
CREAT SERPL-MCNC: 1.3 MG/DL (ref 0.6–1.1)
GFR SERPLBLD CREATININE-BSD FMLA CKD-EPI: 48 ML/MIN/{1.73_M2}
GLUCOSE SERPL-MCNC: 126 MG/DL (ref 70–99)
NT-PROBNP SERPL-MCNC: 258 PG/ML (ref 0–124)
POTASSIUM SERPL-SCNC: 4.1 MMOL/L (ref 3.5–5.1)
REASON FOR REJECTION: NORMAL
REJECTED TEST: NORMAL
SODIUM SERPL-SCNC: 139 MMOL/L (ref 136–145)

## 2025-02-06 PROCEDURE — 80048 BASIC METABOLIC PNL TOTAL CA: CPT

## 2025-02-06 PROCEDURE — 83880 ASSAY OF NATRIURETIC PEPTIDE: CPT

## 2025-02-06 PROCEDURE — 2500000003 HC RX 250 WO HCPCS: Performed by: INTERNAL MEDICINE

## 2025-02-06 PROCEDURE — 36415 COLL VENOUS BLD VENIPUNCTURE: CPT

## 2025-02-06 PROCEDURE — 96365 THER/PROPH/DIAG IV INF INIT: CPT

## 2025-02-06 PROCEDURE — 96366 THER/PROPH/DIAG IV INF ADDON: CPT

## 2025-02-06 PROCEDURE — 99211 OFF/OP EST MAY X REQ PHY/QHP: CPT

## 2025-02-06 PROCEDURE — 2580000003 HC RX 258: Performed by: INTERNAL MEDICINE

## 2025-02-06 PROCEDURE — 96375 TX/PRO/DX INJ NEW DRUG ADDON: CPT

## 2025-02-06 PROCEDURE — 6360000002 HC RX W HCPCS: Performed by: INTERNAL MEDICINE

## 2025-02-06 RX ORDER — FUROSEMIDE 10 MG/ML
120 INJECTION INTRAMUSCULAR; INTRAVENOUS ONCE
Status: CANCELLED
Start: 2025-02-13 | End: 2025-02-13

## 2025-02-06 RX ORDER — SODIUM CHLORIDE 0.9 % (FLUSH) 0.9 %
5-40 SYRINGE (ML) INJECTION ONCE
Status: COMPLETED | OUTPATIENT
Start: 2025-02-06 | End: 2025-02-06

## 2025-02-06 RX ORDER — FUROSEMIDE 10 MG/ML
120 INJECTION INTRAMUSCULAR; INTRAVENOUS ONCE
Status: COMPLETED | OUTPATIENT
Start: 2025-02-06 | End: 2025-02-06

## 2025-02-06 RX ORDER — SODIUM CHLORIDE 0.9 % (FLUSH) 0.9 %
5-40 SYRINGE (ML) INJECTION ONCE
Status: CANCELLED
Start: 2025-02-13 | End: 2025-02-13

## 2025-02-06 RX ADMIN — FUROSEMIDE 120 MG: 10 INJECTION, SOLUTION INTRAMUSCULAR; INTRAVENOUS at 10:47

## 2025-02-06 RX ADMIN — FUROSEMIDE 20 MG/HR: 10 INJECTION, SOLUTION INTRAMUSCULAR; INTRAVENOUS at 10:58

## 2025-02-06 RX ADMIN — Medication 20 ML: at 10:45

## 2025-02-06 NOTE — TELEPHONE ENCOUNTER
----- Message from Aroldo Mckinney MD sent at 3/21/2021  8:10 PM EDT -----  Please call patient and find out when she last took metolazone prior to these labs. Her kidneys look a little dry, but might be related to recent metolazone use. Will wait and see about next labs before making changes.   ARETHA [facility]

## 2025-02-07 ENCOUNTER — TELEPHONE (OUTPATIENT)
Dept: INTERNAL MEDICINE CLINIC | Age: 55
End: 2025-02-07

## 2025-02-07 DIAGNOSIS — E11.69 TYPE 2 DIABETES MELLITUS WITH OTHER SPECIFIED COMPLICATION, WITH LONG-TERM CURRENT USE OF INSULIN (HCC): ICD-10-CM

## 2025-02-07 DIAGNOSIS — Z79.4 TYPE 2 DIABETES MELLITUS WITH OTHER SPECIFIED COMPLICATION, WITH LONG-TERM CURRENT USE OF INSULIN (HCC): ICD-10-CM

## 2025-02-07 NOTE — TELEPHONE ENCOUNTER
Left VM for [facility]  at [phone]. Unable to fax orders to [phone], continues to be busy. Checking for additional option for fax or email to send orders to.   97

## 2025-02-07 NOTE — TELEPHONE ENCOUNTER
Session Code 19161         2nd attempt to contact this patient regarding the previous message  CLINICAL PHARMACY: [facility]  Patient unavailable at the time of call. Left following message on home TAD using : please call back at toll-free [phone] to retrieve previous message.    Letter mailed to home      For Pharmacy Admin Tracking Only    Program: [employer]  CPA in place:  No  Gap Closed?: No   Time Spent (min): 10         If patient or  calls back, Eduin can take on his schedule Monday 2/10/25

## 2025-02-10 DIAGNOSIS — I50.30 (HFPEF) HEART FAILURE WITH PRESERVED EJECTION FRACTION (HCC): Chronic | ICD-10-CM

## 2025-02-10 DIAGNOSIS — I25.10 CORONARY ARTERY DISEASE INVOLVING NATIVE CORONARY ARTERY OF NATIVE HEART WITHOUT ANGINA PECTORIS: Chronic | ICD-10-CM

## 2025-02-10 DIAGNOSIS — I25.83 CORONARY ARTERY DISEASE DUE TO LIPID RICH PLAQUE: Chronic | ICD-10-CM

## 2025-02-10 DIAGNOSIS — N28.9 RENAL INSUFFICIENCY: Chronic | ICD-10-CM

## 2025-02-10 DIAGNOSIS — I25.10 CORONARY ARTERY DISEASE DUE TO LIPID RICH PLAQUE: Chronic | ICD-10-CM

## 2025-02-10 DIAGNOSIS — I42.9 CARDIOMYOPATHY, UNSPECIFIED TYPE (HCC): Chronic | ICD-10-CM

## 2025-02-10 RX ORDER — DULAGLUTIDE 1.5 MG/.5ML
1.5 INJECTION, SOLUTION SUBCUTANEOUS WEEKLY
Qty: 2 ML | Refills: 0 | OUTPATIENT
Start: 2025-02-10

## 2025-02-10 NOTE — TELEPHONE ENCOUNTER
Medication:   Requested Prescriptions     Refused Prescriptions Disp Refills    TRULICITY 1.5 MG/0.5ML SC injection [Pharmacy Med Name: Trulicity Subcutaneous Solution Auto-injector 1.5 MG/0.5ML] 2 mL 0     Sig: Inject 0.5 mLs into the skin once a week     Refused By: ARAMIS LENTZ     Reason for Refusal: Medication discontinued         Last appt: 12/18/2024   Next appt: 4/22/2025    Last Labs DM:   Lab Results   Component Value Date/Time    LABA1C 9.0 12/12/2024 02:17 PM     Last Lipid:   Lab Results   Component Value Date/Time    CHOL 150 12/12/2024 09:37 AM    TRIG 391 12/12/2024 09:37 AM    HDL 29 12/12/2024 09:37 AM     Last PSA: No results found for: \"PSA\"  Last Thyroid:   Lab Results   Component Value Date/Time    TSH 1.27 12/12/2024 09:37 AM    TSH 2.02 06/13/2024 02:15 PM    Q8DPSKI 0.59 03/15/2024 05:05 AM    T4FREE 1.2 06/13/2024 02:15 PM

## 2025-02-11 RX ORDER — METOPROLOL TARTRATE 25 MG/1
25 TABLET, FILM COATED ORAL 2 TIMES DAILY
Qty: 180 TABLET | Refills: 3 | Status: SHIPPED | OUTPATIENT
Start: 2025-02-11

## 2025-02-11 NOTE — TELEPHONE ENCOUNTER
Prescription refill:  Requested Prescriptions     Pending Prescriptions Disp Refills    metoprolol tartrate (LOPRESSOR) 25 MG tablet [Pharmacy Med Name: Metoprolol Tartrate Oral Tablet 25 MG] 180 tablet 3     Sig: TAKE 1 TABLET BY MOUTH 2 TIMES A DAY       Refill parameters per protocol were met    Last OV: 8/7/2024     Future Appt: 2/18/2025     Labs:  2/6/25

## 2025-02-13 ENCOUNTER — HOSPITAL ENCOUNTER (OUTPATIENT)
Dept: ONCOLOGY | Age: 55
Setting detail: INFUSION SERIES
Discharge: HOME | End: 2025-02-13
Payer: COMMERCIAL

## 2025-02-13 VITALS
SYSTOLIC BLOOD PRESSURE: 120 MMHG | BODY MASS INDEX: 34.74 KG/M2 | HEART RATE: 69 BPM | TEMPERATURE: 97.5 F | DIASTOLIC BLOOD PRESSURE: 71 MMHG | WEIGHT: 202.4 LBS | RESPIRATION RATE: 16 BRPM

## 2025-02-13 DIAGNOSIS — I50.32 CHRONIC DIASTOLIC CONGESTIVE HEART FAILURE (HCC): Primary | ICD-10-CM

## 2025-02-13 DIAGNOSIS — R06.02 SHORTNESS OF BREATH: ICD-10-CM

## 2025-02-13 DIAGNOSIS — I50.32 CHRONIC HEART FAILURE WITH PRESERVED EJECTION FRACTION (HCC): ICD-10-CM

## 2025-02-13 LAB
ANION GAP SERPL CALCULATED.3IONS-SCNC: 11 MMOL/L (ref 3–16)
BUN SERPL-MCNC: 46 MG/DL (ref 7–20)
CALCIUM SERPL-MCNC: 9.3 MG/DL (ref 8.3–10.6)
CHLORIDE SERPL-SCNC: 99 MMOL/L (ref 99–110)
CO2 SERPL-SCNC: 30 MMOL/L (ref 21–32)
CREAT SERPL-MCNC: 1.4 MG/DL (ref 0.6–1.1)
GFR SERPLBLD CREATININE-BSD FMLA CKD-EPI: 44 ML/MIN/{1.73_M2}
GLUCOSE SERPL-MCNC: 165 MG/DL (ref 70–99)
NT-PROBNP SERPL-MCNC: 126 PG/ML (ref 0–124)
POTASSIUM SERPL-SCNC: 4.1 MMOL/L (ref 3.5–5.1)
SODIUM SERPL-SCNC: 140 MMOL/L (ref 136–145)

## 2025-02-13 PROCEDURE — 96365 THER/PROPH/DIAG IV INF INIT: CPT

## 2025-02-13 PROCEDURE — 2500000003 HC RX 250 WO HCPCS: Performed by: INTERNAL MEDICINE

## 2025-02-13 PROCEDURE — 80048 BASIC METABOLIC PNL TOTAL CA: CPT

## 2025-02-13 PROCEDURE — 96366 THER/PROPH/DIAG IV INF ADDON: CPT

## 2025-02-13 PROCEDURE — 2580000003 HC RX 258: Performed by: INTERNAL MEDICINE

## 2025-02-13 PROCEDURE — 96375 TX/PRO/DX INJ NEW DRUG ADDON: CPT

## 2025-02-13 PROCEDURE — 83880 ASSAY OF NATRIURETIC PEPTIDE: CPT

## 2025-02-13 PROCEDURE — 6360000002 HC RX W HCPCS: Performed by: INTERNAL MEDICINE

## 2025-02-13 PROCEDURE — 99211 OFF/OP EST MAY X REQ PHY/QHP: CPT

## 2025-02-13 RX ORDER — SODIUM CHLORIDE 0.9 % (FLUSH) 0.9 %
5-40 SYRINGE (ML) INJECTION ONCE
Status: CANCELLED
Start: 2025-02-20 | End: 2025-02-20

## 2025-02-13 RX ORDER — SODIUM CHLORIDE 0.9 % (FLUSH) 0.9 %
5-40 SYRINGE (ML) INJECTION ONCE
Status: COMPLETED | OUTPATIENT
Start: 2025-02-13 | End: 2025-02-13

## 2025-02-13 RX ORDER — FUROSEMIDE 10 MG/ML
120 INJECTION INTRAMUSCULAR; INTRAVENOUS ONCE
Status: COMPLETED | OUTPATIENT
Start: 2025-02-13 | End: 2025-02-13

## 2025-02-13 RX ORDER — FUROSEMIDE 10 MG/ML
120 INJECTION INTRAMUSCULAR; INTRAVENOUS ONCE
Status: CANCELLED
Start: 2025-02-20 | End: 2025-02-20

## 2025-02-13 RX ADMIN — FUROSEMIDE 20 MG/HR: 10 INJECTION, SOLUTION INTRAMUSCULAR; INTRAVENOUS at 10:08

## 2025-02-13 RX ADMIN — FUROSEMIDE 120 MG: 10 INJECTION, SOLUTION INTRAMUSCULAR; INTRAVENOUS at 10:03

## 2025-02-13 RX ADMIN — SODIUM CHLORIDE, PRESERVATIVE FREE 10 ML: 5 INJECTION INTRAVENOUS at 10:02

## 2025-02-17 ASSESSMENT — ENCOUNTER SYMPTOMS
SHORTNESS OF BREATH: 1
GASTROINTESTINAL NEGATIVE: 1

## 2025-02-17 NOTE — PROGRESS NOTES
TIMES A DAY  Patient taking differently: Take 2 tablets by mouth 2 times daily 9/6/22   Mariana Simental DO   Continuous Blood Gluc Sensor (FREESTYLE EMERALD 2 SENSOR) MISC Change every 14 days 7/27/22   Lizzy Taylor MD   Blood Glucose Monitoring Suppl (ONETOUCH VERIO) w/Device KIT Use to check glucose 4 times daily. 7/12/22   Lizzy Taylor MD   Compression Stockings MISC by Does not apply route Zippered stockings for daily use on lower extremities (do not wear at night). 20-30mmHg. 4/20/22   Jessa Parsons MD   Continuous Blood Gluc  (FREESTYLE EMERALD 2 READER) MINDY To check glucose levels 3/15/22   Lizzy Taylor MD   albuterol (PROVENTIL) (2.5 MG/3ML) 0.083% nebulizer solution Take 3 mLs by nebulization every 6 hours as needed for Wheezing or Shortness of Breath 1/26/22   Mariana Simental DO   brexpiprazole (REXULTI) 4 MG TABS tablet Take 1 tablet by mouth at bedtime 7/21/21   Summer Schneider MD   loratadine (CLARITIN) 10 MG tablet TAKE 1 TABLET BY MOUTH ONE TIME A DAY   Patient taking differently: Take 1 tablet by mouth daily 10/5/20   Mariana Simental DO   OXYGEN Inhale 2 L into the lungs continuous Sometimes with activity    ProviderSummer MD   oxyCODONE (OXYCONTIN) 20 MG extended release tablet Take 1 tablet by mouth in the morning and 1 tablet in the evening.    ProviderSummer MD   aspirin 81 MG EC tablet Take 1 tablet by mouth daily    Summer Schneider MD   benztropine (COGENTIN) 2 MG tablet Take 1 tablet by mouth nightly    Summer Schneider MD   duloxetine (CYMBALTA) 60 MG capsule Take 1 capsule by mouth 2 times daily    ProviderSummer MD        Allergies:  Iodides, Other, and Trazodone     ROS:   Review of Systems   Constitutional:  Positive for fatigue.   Respiratory:  Positive for shortness of breath.    Cardiovascular:  Positive for leg swelling.   Gastrointestinal: Negative.    Genitourinary: Negative.    Musculoskeletal: Negative.    Skin: Negative.

## 2025-02-18 ENCOUNTER — OFFICE VISIT (OUTPATIENT)
Dept: CARDIOLOGY CLINIC | Age: 55
End: 2025-02-18

## 2025-02-18 VITALS
HEART RATE: 70 BPM | WEIGHT: 207 LBS | OXYGEN SATURATION: 94 % | SYSTOLIC BLOOD PRESSURE: 100 MMHG | BODY MASS INDEX: 35.34 KG/M2 | HEIGHT: 64 IN | DIASTOLIC BLOOD PRESSURE: 60 MMHG

## 2025-02-18 DIAGNOSIS — I50.32 CHRONIC DIASTOLIC CONGESTIVE HEART FAILURE (HCC): Primary | ICD-10-CM

## 2025-02-18 DIAGNOSIS — I10 ESSENTIAL HYPERTENSION: Chronic | ICD-10-CM

## 2025-02-18 DIAGNOSIS — R06.02 SHORTNESS OF BREATH: ICD-10-CM

## 2025-02-18 DIAGNOSIS — D50.9 IRON DEFICIENCY ANEMIA, UNSPECIFIED IRON DEFICIENCY ANEMIA TYPE: ICD-10-CM

## 2025-02-18 NOTE — PATIENT INSTRUCTIONS
Instructions:   Medications:continue current meds with higher dose torsemide if wt 205 or higher, weekly infusions  Labs with infusions  [facility]: [phone]

## 2025-02-19 RX ORDER — FERROUS SULFATE 325(65) MG
1 TABLET ORAL 2 TIMES DAILY WITH MEALS
Qty: 180 TABLET | Refills: 0 | Status: SHIPPED | OUTPATIENT
Start: 2025-02-19

## 2025-02-20 ENCOUNTER — HOSPITAL ENCOUNTER (OUTPATIENT)
Dept: ONCOLOGY | Age: 55
Setting detail: INFUSION SERIES
Discharge: HOME | End: 2025-02-20
Payer: COMMERCIAL

## 2025-02-20 VITALS
HEART RATE: 70 BPM | WEIGHT: 212.5 LBS | SYSTOLIC BLOOD PRESSURE: 127 MMHG | RESPIRATION RATE: 16 BRPM | TEMPERATURE: 96.9 F | BODY MASS INDEX: 36.48 KG/M2 | DIASTOLIC BLOOD PRESSURE: 56 MMHG

## 2025-02-20 DIAGNOSIS — R06.02 SHORTNESS OF BREATH: ICD-10-CM

## 2025-02-20 DIAGNOSIS — I50.32 CHRONIC DIASTOLIC CONGESTIVE HEART FAILURE (HCC): Primary | ICD-10-CM

## 2025-02-20 DIAGNOSIS — I50.32 CHRONIC HEART FAILURE WITH PRESERVED EJECTION FRACTION (HCC): ICD-10-CM

## 2025-02-20 LAB
ANION GAP SERPL CALCULATED.3IONS-SCNC: 10 MMOL/L (ref 3–16)
BUN SERPL-MCNC: 40 MG/DL (ref 7–20)
CALCIUM SERPL-MCNC: 9.1 MG/DL (ref 8.3–10.6)
CHLORIDE SERPL-SCNC: 95 MMOL/L (ref 99–110)
CO2 SERPL-SCNC: 32 MMOL/L (ref 21–32)
CREAT SERPL-MCNC: 1.7 MG/DL (ref 0.6–1.1)
GFR SERPLBLD CREATININE-BSD FMLA CKD-EPI: 35 ML/MIN/{1.73_M2}
GLUCOSE SERPL-MCNC: 154 MG/DL (ref 70–99)
NT-PROBNP SERPL-MCNC: 311 PG/ML (ref 0–124)
POTASSIUM SERPL-SCNC: 4.6 MMOL/L (ref 3.5–5.1)
SODIUM SERPL-SCNC: 137 MMOL/L (ref 136–145)

## 2025-02-20 PROCEDURE — 96365 THER/PROPH/DIAG IV INF INIT: CPT

## 2025-02-20 PROCEDURE — 6360000002 HC RX W HCPCS: Performed by: INTERNAL MEDICINE

## 2025-02-20 PROCEDURE — 80048 BASIC METABOLIC PNL TOTAL CA: CPT

## 2025-02-20 PROCEDURE — 2500000003 HC RX 250 WO HCPCS: Performed by: INTERNAL MEDICINE

## 2025-02-20 PROCEDURE — 83880 ASSAY OF NATRIURETIC PEPTIDE: CPT

## 2025-02-20 PROCEDURE — 2580000003 HC RX 258: Performed by: INTERNAL MEDICINE

## 2025-02-20 PROCEDURE — 99211 OFF/OP EST MAY X REQ PHY/QHP: CPT

## 2025-02-20 RX ORDER — FUROSEMIDE 10 MG/ML
120 INJECTION INTRAMUSCULAR; INTRAVENOUS ONCE
Status: COMPLETED | OUTPATIENT
Start: 2025-02-20 | End: 2025-02-20

## 2025-02-20 RX ORDER — SODIUM CHLORIDE 0.9 % (FLUSH) 0.9 %
5-40 SYRINGE (ML) INJECTION ONCE
Status: COMPLETED | OUTPATIENT
Start: 2025-02-20 | End: 2025-02-20

## 2025-02-20 RX ORDER — SODIUM CHLORIDE 0.9 % (FLUSH) 0.9 %
5-40 SYRINGE (ML) INJECTION ONCE
Status: CANCELLED
Start: 2025-02-27 | End: 2025-02-27

## 2025-02-20 RX ORDER — FUROSEMIDE 10 MG/ML
120 INJECTION INTRAMUSCULAR; INTRAVENOUS ONCE
Status: CANCELLED
Start: 2025-02-27 | End: 2025-02-27

## 2025-02-20 RX ADMIN — FUROSEMIDE 120 MG: 10 INJECTION, SOLUTION INTRAMUSCULAR; INTRAVENOUS at 09:58

## 2025-02-20 RX ADMIN — FUROSEMIDE 20 MG/HR: 10 INJECTION, SOLUTION INTRAMUSCULAR; INTRAVENOUS at 10:04

## 2025-02-20 RX ADMIN — Medication 10 ML: at 09:40

## 2025-02-20 NOTE — PROGRESS NOTES
Patient to infusion center in wheelchair, with  at side,  mg Lasix given over 6 minutes, followed by Lasix infusion 20 mg/hr over 4 hours completed, see MAR. Pt tolerated well, voided in bathroom multiple times during infusion. Gait steady. SaO2 99% with O2 at 2l/NC. Pt discharged per wheel chair to car accompanied by .  To return next week for weekly IV labs and lasix.

## 2025-02-24 DIAGNOSIS — E11.69 TYPE 2 DIABETES MELLITUS WITH OTHER SPECIFIED COMPLICATION, WITH LONG-TERM CURRENT USE OF INSULIN (HCC): ICD-10-CM

## 2025-02-24 DIAGNOSIS — Z79.4 TYPE 2 DIABETES MELLITUS WITH OTHER SPECIFIED COMPLICATION, WITH LONG-TERM CURRENT USE OF INSULIN (HCC): ICD-10-CM

## 2025-02-25 RX ORDER — DULAGLUTIDE 1.5 MG/.5ML
1.5 INJECTION, SOLUTION SUBCUTANEOUS WEEKLY
Qty: 2 ML | Refills: 0 | OUTPATIENT
Start: 2025-02-25

## 2025-02-27 ENCOUNTER — HOSPITAL ENCOUNTER (OUTPATIENT)
Dept: ONCOLOGY | Age: 55
Setting detail: INFUSION SERIES
Discharge: HOME | End: 2025-02-27
Payer: COMMERCIAL

## 2025-02-27 VITALS
SYSTOLIC BLOOD PRESSURE: 128 MMHG | WEIGHT: 211.3 LBS | DIASTOLIC BLOOD PRESSURE: 67 MMHG | HEART RATE: 80 BPM | RESPIRATION RATE: 18 BRPM | BODY MASS INDEX: 36.27 KG/M2 | TEMPERATURE: 96.8 F

## 2025-02-27 DIAGNOSIS — E55.9 VITAMIN D DEFICIENCY: ICD-10-CM

## 2025-02-27 DIAGNOSIS — Z79.4 TYPE 2 DIABETES MELLITUS WITH OTHER CIRCULATORY COMPLICATION, WITH LONG-TERM CURRENT USE OF INSULIN (HCC): ICD-10-CM

## 2025-02-27 DIAGNOSIS — I50.32 CHRONIC DIASTOLIC CONGESTIVE HEART FAILURE (HCC): Primary | ICD-10-CM

## 2025-02-27 DIAGNOSIS — E03.2 HYPOTHYROIDISM DUE TO MEDICATION: ICD-10-CM

## 2025-02-27 DIAGNOSIS — R06.02 SHORTNESS OF BREATH: ICD-10-CM

## 2025-02-27 DIAGNOSIS — I50.32 CHRONIC HEART FAILURE WITH PRESERVED EJECTION FRACTION (HCC): ICD-10-CM

## 2025-02-27 DIAGNOSIS — E11.59 TYPE 2 DIABETES MELLITUS WITH OTHER CIRCULATORY COMPLICATION, WITH LONG-TERM CURRENT USE OF INSULIN (HCC): ICD-10-CM

## 2025-02-27 LAB
ANION GAP SERPL CALCULATED.3IONS-SCNC: 11 MMOL/L (ref 3–16)
BUN SERPL-MCNC: 27 MG/DL (ref 7–20)
CALCIUM SERPL-MCNC: 9.6 MG/DL (ref 8.3–10.6)
CHLORIDE SERPL-SCNC: 99 MMOL/L (ref 99–110)
CO2 SERPL-SCNC: 31 MMOL/L (ref 21–32)
CREAT SERPL-MCNC: 1.2 MG/DL (ref 0.6–1.1)
GFR SERPLBLD CREATININE-BSD FMLA CKD-EPI: 53 ML/MIN/{1.73_M2}
GLUCOSE SERPL-MCNC: 146 MG/DL (ref 70–99)
NT-PROBNP SERPL-MCNC: 833 PG/ML (ref 0–124)
POTASSIUM SERPL-SCNC: 3.9 MMOL/L (ref 3.5–5.1)
SODIUM SERPL-SCNC: 141 MMOL/L (ref 136–145)

## 2025-02-27 PROCEDURE — 96375 TX/PRO/DX INJ NEW DRUG ADDON: CPT

## 2025-02-27 PROCEDURE — 6360000002 HC RX W HCPCS: Performed by: INTERNAL MEDICINE

## 2025-02-27 PROCEDURE — 2500000003 HC RX 250 WO HCPCS: Performed by: INTERNAL MEDICINE

## 2025-02-27 PROCEDURE — 80048 BASIC METABOLIC PNL TOTAL CA: CPT

## 2025-02-27 PROCEDURE — 96366 THER/PROPH/DIAG IV INF ADDON: CPT

## 2025-02-27 PROCEDURE — 99211 OFF/OP EST MAY X REQ PHY/QHP: CPT

## 2025-02-27 PROCEDURE — 96365 THER/PROPH/DIAG IV INF INIT: CPT

## 2025-02-27 PROCEDURE — 2580000003 HC RX 258: Performed by: INTERNAL MEDICINE

## 2025-02-27 PROCEDURE — 83880 ASSAY OF NATRIURETIC PEPTIDE: CPT

## 2025-02-27 RX ORDER — METOLAZONE 5 MG/1
5 TABLET ORAL WEEKLY
Qty: 60 TABLET | Refills: 3 | OUTPATIENT
Start: 2025-02-27

## 2025-02-27 RX ORDER — SODIUM CHLORIDE 0.9 % (FLUSH) 0.9 %
5-40 SYRINGE (ML) INJECTION ONCE
Start: 2025-03-06 | End: 2025-03-06

## 2025-02-27 RX ORDER — SODIUM CHLORIDE 0.9 % (FLUSH) 0.9 %
5-40 SYRINGE (ML) INJECTION ONCE
Status: COMPLETED | OUTPATIENT
Start: 2025-02-27 | End: 2025-02-27

## 2025-02-27 RX ORDER — FUROSEMIDE 10 MG/ML
120 INJECTION INTRAMUSCULAR; INTRAVENOUS ONCE
Status: COMPLETED | OUTPATIENT
Start: 2025-02-27 | End: 2025-02-27

## 2025-02-27 RX ORDER — FUROSEMIDE 10 MG/ML
120 INJECTION INTRAMUSCULAR; INTRAVENOUS ONCE
Start: 2025-03-06 | End: 2025-03-06

## 2025-02-27 RX ORDER — METOLAZONE 5 MG/1
5 TABLET ORAL WEEKLY
Qty: 60 TABLET | Refills: 3 | Status: SHIPPED | OUTPATIENT
Start: 2025-02-27

## 2025-02-27 RX ADMIN — FUROSEMIDE 20 MG/HR: 10 INJECTION, SOLUTION INTRAVENOUS at 10:16

## 2025-02-27 RX ADMIN — Medication 10 ML: at 10:12

## 2025-02-27 RX ADMIN — FUROSEMIDE 120 MG: 10 INJECTION, SOLUTION INTRAMUSCULAR; INTRAVENOUS at 10:11

## 2025-02-27 NOTE — PROGRESS NOTES
Pt to Dept for Lasix IVP and Lasix gtt as ordered from Dr. Parsons. Weight obtained and Labs drawn this AM. Results reviewed per Nhung Fernandez CNP ,Ok to proceed with Tx as ordered today.Nhung to clinic to see Pt, verbalized for Metolazone to be taken on Mon/ Thurs. Pt and Pt's  verbalized understanding.Pt took Metolazone dose this morning.  Lasix 120mg given IVP over 6 minutes followed by Lasix gtt started at 20mg/hr for 4 hours. Pt scheduled to return next Thursday for same treatment. Pt discharged home at this time per W/C with Home O2 with .

## 2025-02-27 NOTE — TELEPHONE ENCOUNTER
Medication:   Requested Prescriptions      No prescriptions requested or ordered in this encounter       Last Filled:      Patient Phone Number: [phone] (home)     Last appt: 12/18/2024   Next appt: 4/22/2025    Last Thyroid:   Lab Results   Component Value Date/Time    TSH 1.27 12/12/2024 09:37 AM    TSH 2.02 06/13/2024 02:15 PM    P5TYHZQ 0.59 03/15/2024 05:05 AM    T4FREE 1.2 06/13/2024 02:15 PM

## 2025-02-27 NOTE — TELEPHONE ENCOUNTER
Should patient get 60 tablets quantity or should it be 4 tablets monthly with refills?  Please advise

## 2025-02-27 NOTE — TELEPHONE ENCOUNTER
Fax from [facility] on [address]    Renewal request for Levothyroxine Sodium Oral Tab 150 mcg    LOV   12/18/24  FOV   4/22/25

## 2025-02-27 NOTE — TELEPHONE ENCOUNTER
Medication Question/Concern    What is the name of the medication you need to speak with someone about?  metOLazone (ZAROXOLYN)   Dosage of the medication:  5 MG tablet   How are you taking this medication:  Take 1 tablet by mouth once a week Take one tablet 30 Minutes Before Lasix Infusion and one on Mondays 30min before torsemide   What issues/concerns are you having with this medication:  Melvina states pt usually receives 4 tablets a month, and they would like to confirm the changes on the script to 60 tablets.    Please advise.

## 2025-02-28 DIAGNOSIS — Z79.4 TYPE 2 DIABETES MELLITUS WITH OTHER CIRCULATORY COMPLICATION, WITH LONG-TERM CURRENT USE OF INSULIN (HCC): ICD-10-CM

## 2025-02-28 DIAGNOSIS — E11.59 TYPE 2 DIABETES MELLITUS WITH OTHER CIRCULATORY COMPLICATION, WITH LONG-TERM CURRENT USE OF INSULIN (HCC): ICD-10-CM

## 2025-02-28 DIAGNOSIS — E03.2 HYPOTHYROIDISM DUE TO MEDICATION: ICD-10-CM

## 2025-02-28 DIAGNOSIS — E55.9 VITAMIN D DEFICIENCY: ICD-10-CM

## 2025-03-03 RX ORDER — LEVOTHYROXINE SODIUM 150 UG/1
150 TABLET ORAL
Qty: 90 TABLET | Refills: 0 | Status: SHIPPED | OUTPATIENT
Start: 2025-03-03

## 2025-03-03 NOTE — TELEPHONE ENCOUNTER
Medication:   Requested Prescriptions     Signed Prescriptions Disp Refills    levothyroxine (SYNTHROID) 150 MCG tablet 90 tablet 0     Sig: Take 1 tablet by mouth every morning (before breakfast)     Authorizing Provider: ABBY RODRÍGUEZ     Ordering User: ARAMIS LENTZ         Last appt: 12/18/2024   Next appt: 4/22/2025    Last Labs DM:   Lab Results   Component Value Date/Time    LABA1C 9.0 12/12/2024 02:17 PM     Last Lipid:   Lab Results   Component Value Date/Time    CHOL 150 12/12/2024 09:37 AM    TRIG 391 12/12/2024 09:37 AM    HDL 29 12/12/2024 09:37 AM     Last PSA: No results found for: \"PSA\"  Last Thyroid:   Lab Results   Component Value Date/Time    TSH 1.27 12/12/2024 09:37 AM    TSH 2.02 06/13/2024 02:15 PM    Q5JTDJI 0.59 03/15/2024 05:05 AM    T4FREE 1.2 06/13/2024 02:15 PM

## 2025-03-04 NOTE — TELEPHONE ENCOUNTER
Problem: At Risk for Falls  Goal: Patient does not fall  Outcome: Monitoring/Evaluating progress  Goal: Patient takes action to control fall-related risks  Outcome: Monitoring/Evaluating progress     Problem: At Risk for Injury Due to Fall  Goal: Patient does not fall  Outcome: Monitoring/Evaluating progress  Goal: Takes action to control condition specific risks  Outcome: Monitoring/Evaluating progress     Problem: Fluid Volume Excess  Goal: Fluid Volume Excess Symptoms Resolved  Description: Treatment often consists of oxygen and respiratory support with diuretic therapy at doses that exceed usual dose (typically doubled).  Monitor patient response to treatment.    Acute dyspnea should resolve quickly if dose is adequate and kidney function is adequate. Dyspnea/SOB should only be observed with Activity after effective treatment. Patient should be able to lie down comfortably, without SOB.  Outcome: Monitoring/Evaluating progress  Goal: Oxygenation is maintained (SpO2 greater than or equal to 90% or as ordered)  Outcome: Monitoring/Evaluating progress     Problem: Diabetes  Goal: Achieves glycemic balance  Description: Goal is to maintain blood sugar within range with no episodes of hypoglycemia  Outcome: Monitoring/Evaluating progress  Goal: Demonstrates ability to self-administer insulin  Description: Document on Patient Education Activity  Outcome: Monitoring/Evaluating progress  Goal: Verbalizes/demonstrates understanding of NEW diagnosis of diabetes and management  Description: Document on Patient Education Activity  Outcome: Monitoring/Evaluating progress     Problem: Pain  Goal: Acceptable pain level achieved/maintained at rest using appropriate pain scale for the patient  Outcome: Monitoring/Evaluating progress  Goal: Acceptable pain level achieved/maintained with activity using appropriate pain scale for the patient  Outcome: Monitoring/Evaluating progress  Goal: Acceptable pain level achieved/maintained  Sent to pharmacy. without oversedation  Outcome: Monitoring/Evaluating progress     Problem: Myocardial Dysfunction requiring Mechanical Circulatory Support Device  Goal: Hemodynamic stability achieved/maintained  Description: Ensure device is set to optimize hemodynamics  Outcome: Monitoring/Evaluating progress  Goal: Mechanical Circulatory Support Device (MCSD) safety maintained  Description: Patient remains on AC Power until test is passed Power connections ONLY with MCSD staff until test passed. Perform Mechanical Circulatory Support Device system checks per protocol.  Assess drive lines for kinks, loose connections and stability Q 8 hrs and with activity.  Keep backup console near bedside and plugged into AC power.  Outcome: Monitoring/Evaluating progress  Goal: Activity level is maintained/returns to level needed for d/c  Description: SpO2, HR, MCSD flows, RR, & BP in expected range in response to activity, walking, ADLs, stairs  Outcome: Monitoring/Evaluating progress  Goal: Verbalizes understanding of preop, periop and postop teaching (pt/family)  Description: Document in Patient Education Activity  Outcome: Monitoring/Evaluating progress  Goal: Patient successfully completes written safety test  Outcome: Monitoring/Evaluating progress  Goal: Patient successfully completes hands-on safety test  Outcome: Monitoring/Evaluating progress     Problem: Cardiac Surgery  Goal: Hemodynamic stability achieved/maintained  Description: Hemodynamic instability may include VS or rhythm changes or s/s FVE.  AHA guidelines: Keep BP>90 mm Hg.  Outcome: Monitoring/Evaluating progress  Goal: Elimination status is maintained/returned to baseline  Outcome: Monitoring/Evaluating progress  Goal: Activity level is maintained/returned to level needed for d/c  Outcome: Monitoring/Evaluating progress     Problem: Skin Integrity Alteration  Goal: Skin remains intact with no new/deterioration of wound or pressure injury  Outcome: Monitoring/Evaluating  progress     Problem: Anxiety  Goal: Anxiety is at a manageable level with interventions  Outcome: Monitoring/Evaluating progress  Goal: Anxiety is decreased  Outcome: Monitoring/Evaluating progress  Goal: Verbalizes understanding of anxiety symptoms and management  Description: Document on Patient Education Activity   Outcome: Monitoring/Evaluating progress     Problem: Nausea/Vomiting  Goal: Maintains oral intake with decreased/no reports of nausea/vomiting  Outcome: Monitoring/Evaluating progress  Goal: Current weight maintained or increased  Outcome: Monitoring/Evaluating progress  Goal: Verbalizes understanding of s/s and strategies to control nausea/vomiting  Description: Document education using the patient education activity.   Outcome: Monitoring/Evaluating progress

## 2025-03-05 RX ORDER — LEVOTHYROXINE SODIUM 150 UG/1
150 TABLET ORAL
Qty: 90 TABLET | Refills: 0 | Status: SHIPPED | OUTPATIENT
Start: 2025-03-05

## 2025-03-06 ENCOUNTER — HOSPITAL ENCOUNTER (OUTPATIENT)
Dept: ONCOLOGY | Age: 55
Setting detail: INFUSION SERIES
Discharge: HOME | End: 2025-03-06
Payer: COMMERCIAL

## 2025-03-06 VITALS
HEART RATE: 70 BPM | DIASTOLIC BLOOD PRESSURE: 74 MMHG | TEMPERATURE: 96.8 F | SYSTOLIC BLOOD PRESSURE: 158 MMHG | RESPIRATION RATE: 18 BRPM | BODY MASS INDEX: 36.94 KG/M2 | WEIGHT: 215.2 LBS

## 2025-03-06 DIAGNOSIS — I50.32 CHRONIC DIASTOLIC CONGESTIVE HEART FAILURE (HCC): Primary | ICD-10-CM

## 2025-03-06 DIAGNOSIS — R06.02 SHORTNESS OF BREATH: ICD-10-CM

## 2025-03-06 DIAGNOSIS — I50.32 CHRONIC HEART FAILURE WITH PRESERVED EJECTION FRACTION (HCC): ICD-10-CM

## 2025-03-06 LAB
ANION GAP SERPL CALCULATED.3IONS-SCNC: 8 MMOL/L (ref 3–16)
BUN SERPL-MCNC: 25 MG/DL (ref 7–20)
CALCIUM SERPL-MCNC: 9.6 MG/DL (ref 8.3–10.6)
CHLORIDE SERPL-SCNC: 106 MMOL/L (ref 99–110)
CO2 SERPL-SCNC: 30 MMOL/L (ref 21–32)
CREAT SERPL-MCNC: 1.2 MG/DL (ref 0.6–1.1)
GFR SERPLBLD CREATININE-BSD FMLA CKD-EPI: 53 ML/MIN/{1.73_M2}
GLUCOSE SERPL-MCNC: 184 MG/DL (ref 70–99)
NT-PROBNP SERPL-MCNC: 441 PG/ML (ref 0–124)
POTASSIUM SERPL-SCNC: 4.1 MMOL/L (ref 3.5–5.1)
SODIUM SERPL-SCNC: 144 MMOL/L (ref 136–145)

## 2025-03-06 PROCEDURE — 83880 ASSAY OF NATRIURETIC PEPTIDE: CPT

## 2025-03-06 PROCEDURE — 96365 THER/PROPH/DIAG IV INF INIT: CPT

## 2025-03-06 PROCEDURE — 96366 THER/PROPH/DIAG IV INF ADDON: CPT

## 2025-03-06 PROCEDURE — 99211 OFF/OP EST MAY X REQ PHY/QHP: CPT

## 2025-03-06 PROCEDURE — 2580000003 HC RX 258: Performed by: INTERNAL MEDICINE

## 2025-03-06 PROCEDURE — 2500000003 HC RX 250 WO HCPCS: Performed by: INTERNAL MEDICINE

## 2025-03-06 PROCEDURE — 6360000002 HC RX W HCPCS: Performed by: INTERNAL MEDICINE

## 2025-03-06 PROCEDURE — 80048 BASIC METABOLIC PNL TOTAL CA: CPT

## 2025-03-06 PROCEDURE — 96375 TX/PRO/DX INJ NEW DRUG ADDON: CPT

## 2025-03-06 RX ORDER — SODIUM CHLORIDE 0.9 % (FLUSH) 0.9 %
5-40 SYRINGE (ML) INJECTION ONCE
Status: COMPLETED | OUTPATIENT
Start: 2025-03-06 | End: 2025-03-06

## 2025-03-06 RX ORDER — FUROSEMIDE 10 MG/ML
120 INJECTION INTRAMUSCULAR; INTRAVENOUS ONCE
Status: COMPLETED | OUTPATIENT
Start: 2025-03-06 | End: 2025-03-06

## 2025-03-06 RX ORDER — FUROSEMIDE 10 MG/ML
120 INJECTION INTRAMUSCULAR; INTRAVENOUS ONCE
Status: CANCELLED
Start: 2025-03-13 | End: 2025-03-13

## 2025-03-06 RX ORDER — SODIUM CHLORIDE 0.9 % (FLUSH) 0.9 %
5-40 SYRINGE (ML) INJECTION ONCE
Status: CANCELLED
Start: 2025-03-13 | End: 2025-03-13

## 2025-03-06 RX ADMIN — FUROSEMIDE 120 MG: 10 INJECTION, SOLUTION INTRAMUSCULAR; INTRAVENOUS at 10:07

## 2025-03-06 RX ADMIN — SODIUM CHLORIDE, PRESERVATIVE FREE 10 ML: 5 INJECTION INTRAVENOUS at 10:09

## 2025-03-06 RX ADMIN — FUROSEMIDE 20 MG/HR: 10 INJECTION, SOLUTION INTRAMUSCULAR; INTRAVENOUS at 10:15

## 2025-03-06 NOTE — PROGRESS NOTES
Labs reviewed with Fabi ALLEN prior to administration  mg Lasix given over 6 minutes, followed by Lasix infusion 20 mg/hr over 4 hours completed, see MAR. Pt tolerated well, voided in bathroom multiple times during infusion. Gait steady. SaO2 99% with O2 at 2l/NC. . Pt discharged per wheel chair to car accompanied by .  To return next week for weekly IV labs and lasix.

## 2025-03-10 ENCOUNTER — TELEPHONE (OUTPATIENT)
Dept: CARDIOLOGY CLINIC | Age: 55
End: 2025-03-10

## 2025-03-10 ENCOUNTER — TELEPHONE (OUTPATIENT)
Dept: INTERNAL MEDICINE CLINIC | Age: 55
End: 2025-03-10

## 2025-03-10 ENCOUNTER — OFFICE VISIT (OUTPATIENT)
Dept: INTERNAL MEDICINE CLINIC | Age: 55
End: 2025-03-10
Payer: COMMERCIAL

## 2025-03-10 VITALS
HEIGHT: 64 IN | WEIGHT: 215 LBS | DIASTOLIC BLOOD PRESSURE: 62 MMHG | HEART RATE: 87 BPM | OXYGEN SATURATION: 92 % | SYSTOLIC BLOOD PRESSURE: 108 MMHG | TEMPERATURE: 98.2 F | BODY MASS INDEX: 36.7 KG/M2

## 2025-03-10 DIAGNOSIS — I50.32 CHRONIC HEART FAILURE WITH PRESERVED EJECTION FRACTION (HCC): Chronic | ICD-10-CM

## 2025-03-10 DIAGNOSIS — J96.11 CHRONIC RESPIRATORY FAILURE WITH HYPOXIA (HCC): ICD-10-CM

## 2025-03-10 DIAGNOSIS — I50.32 CHRONIC DIASTOLIC HEART FAILURE (HCC): ICD-10-CM

## 2025-03-10 DIAGNOSIS — I10 ESSENTIAL HYPERTENSION: Chronic | ICD-10-CM

## 2025-03-10 DIAGNOSIS — M50.30 DDD (DEGENERATIVE DISC DISEASE), CERVICAL: ICD-10-CM

## 2025-03-10 DIAGNOSIS — Z79.4 TYPE 2 DIABETES MELLITUS WITH OTHER SPECIFIED COMPLICATION, WITH LONG-TERM CURRENT USE OF INSULIN (HCC): Primary | ICD-10-CM

## 2025-03-10 DIAGNOSIS — E89.89 LYMPHEDEMA OF UPPER EXTREMITY FOLLOWING LYMPHADENECTOMY: ICD-10-CM

## 2025-03-10 DIAGNOSIS — M51.362 DEGENERATION OF INTERVERTEBRAL DISC OF LUMBAR REGION WITH DISCOGENIC BACK PAIN AND LOWER EXTREMITY PAIN: ICD-10-CM

## 2025-03-10 DIAGNOSIS — I89.0 LYMPHEDEMA OF UPPER EXTREMITY FOLLOWING LYMPHADENECTOMY: ICD-10-CM

## 2025-03-10 DIAGNOSIS — I50.32 CHRONIC DIASTOLIC CONGESTIVE HEART FAILURE (HCC): ICD-10-CM

## 2025-03-10 DIAGNOSIS — R06.02 SHORTNESS OF BREATH: ICD-10-CM

## 2025-03-10 DIAGNOSIS — J45.40 MODERATE PERSISTENT ASTHMA WITHOUT COMPLICATION: ICD-10-CM

## 2025-03-10 DIAGNOSIS — E11.69 TYPE 2 DIABETES MELLITUS WITH OTHER SPECIFIED COMPLICATION, WITH LONG-TERM CURRENT USE OF INSULIN (HCC): Primary | ICD-10-CM

## 2025-03-10 PROCEDURE — 3078F DIAST BP <80 MM HG: CPT | Performed by: INTERNAL MEDICINE

## 2025-03-10 PROCEDURE — 3046F HEMOGLOBIN A1C LEVEL >9.0%: CPT | Performed by: INTERNAL MEDICINE

## 2025-03-10 PROCEDURE — 3074F SYST BP LT 130 MM HG: CPT | Performed by: INTERNAL MEDICINE

## 2025-03-10 PROCEDURE — 99215 OFFICE O/P EST HI 40 MIN: CPT | Performed by: INTERNAL MEDICINE

## 2025-03-10 PROCEDURE — 1036F TOBACCO NON-USER: CPT | Performed by: INTERNAL MEDICINE

## 2025-03-10 PROCEDURE — G8417 CALC BMI ABV UP PARAM F/U: HCPCS | Performed by: INTERNAL MEDICINE

## 2025-03-10 PROCEDURE — 2022F DILAT RTA XM EVC RTNOPTHY: CPT | Performed by: INTERNAL MEDICINE

## 2025-03-10 PROCEDURE — 3017F COLORECTAL CA SCREEN DOC REV: CPT | Performed by: INTERNAL MEDICINE

## 2025-03-10 PROCEDURE — G8427 DOCREV CUR MEDS BY ELIG CLIN: HCPCS | Performed by: INTERNAL MEDICINE

## 2025-03-10 RX ORDER — METOLAZONE 5 MG/1
5 TABLET ORAL
Qty: 60 TABLET | Refills: 3 | Status: SHIPPED
Start: 2025-03-10 | End: 2025-03-13

## 2025-03-10 NOTE — ASSESSMENT & PLAN NOTE
Pain management made reference to wanting to use steroid injections in her cervical spine and thoracic spine due to increased pain and muscle tension.  There was concerned that patient's sugars would not tolerate the injection.  It sounds like her sugars have been well-controlled I have encouraged patient's  to inform patient's pain management doctor of patient's diabetes control.

## 2025-03-10 NOTE — ASSESSMENT & PLAN NOTE
Mildly wheezy on exam.  Patient instructed to use an albuterol nebulizer when she gets home and see if that helps improve her breathing and shortness of breath.

## 2025-03-10 NOTE — PATIENT INSTRUCTIONS
Take metaxalone today prior to torsemide.  Call Nhung tomorrow with update on your weight and breathing.  Call  Deysi Small's office and inquire about the treatment for your low back and upper back pain.  Please make sure that you let her know that your sugars have not been high.  Please call and make an appointment with your podiatrist to treat the large crack in your heel.   Please do a breathing treatment when you get home.

## 2025-03-10 NOTE — ASSESSMENT & PLAN NOTE
Follows with with Dr. Taylor endocrinology.  Remains on Tresiba 180 units daily, Mounjaro 2.5 mg weekly, Jardiance 10 mg daily, and NovoLog sliding scale-30 to 50 units with meals.

## 2025-03-10 NOTE — ASSESSMENT & PLAN NOTE
Progressive.  Continues to get weekly Lasix infusions.  Next infusion is due Thursday, 3/13/2025..  Still better following weekly Lasix infusions and then feels progressively worse throughout the week.  Continues to follow with Dr. Parsons for cardiology.  I did discuss patient's case with Nhung Fernandez NP who had increased patient's Zaroxolyn to 5 mg twice weekly on Mondays and Thursdays which it is unclear if patient has been taking the higher dose of Zaroxolyn.  I have encouraged patient to take Zaroxolyn when she gets home and take torsemide 30 minutes later and then she is to call cardiology with an update tomorrow.

## 2025-03-10 NOTE — ASSESSMENT & PLAN NOTE
Shortness of breath has been worse since yesterday along with a 5 pound weight gain.  I did discuss patient's case with her cardiologist, Nhung Fernandez.  Patient has been instructed to increase Zaroxolyn to 5 mg twice weekly on Mondays and Thursdays but it does not sound like she has done so.  I have encouraged patient to take an additional dose of Zaroxolyn today.  Patient's  has been instructed to reach out to cardiology tomorrow with update on her breathing and weight.

## 2025-03-10 NOTE — ASSESSMENT & PLAN NOTE
Worse currently.  Possibly secondary to a mild asthma exacerbation versus fluid overload versus both.  Shortness of breath did improve with increasing oxygen from 2 L/min to 3 L/min.  I have encouraged her to use her albuterol nebulizers more frequently.  She is to increase her Zaroxolyn dosing to twice weekly as instructed previously.

## 2025-03-10 NOTE — ASSESSMENT & PLAN NOTE
Patient had RFA in her lumbar spine in October with improvement in her back pain.  She did recently see pain management at  and there was discussion of performing another RFA procedure on patient.  It looks like insurance approval is pending but I have encouraged her  to reach out to the pain management's office regarding scheduling.

## 2025-03-10 NOTE — PROGRESS NOTES
Patient: Crow Bonner is a 54 y.o. female who presents today with the following Chief Complaint(s):  Chief Complaint   Patient presents with    Follow-up     Patient was having labored breathing when I brought her back. Patient's oxy 2 level was 92 on 2 liters of oxygen. Had patient move O2 3 liters let patient rest and oxygen went to 96. Patient's breathing was no longer .        HPI    Here today for follow up. Accompanied by her  who helps with her history.     Has been more swollen, up 5 pounds over night.   - next Lasix infusion is scheduled for Thursday 3/13/25.   - has been taking additional doses of diuresis but has not had an increased urinary output and weight continues to increase.   - has been more short of breath since yesterday. Getting worse.   - Zaroxolyn  5 mg weekly prior to Lasix infusions.   states that the dose was increased last week but the pharmacy never received the new instructions.  thinks she is now supposed to take it twice weekly.   - last dose was Thursday 3/6 prior to Lasix infusion.    - I cannot find notation of increased Zaroxolyn in her chart. Did d/w her  that if she is to take twice weekly a new script was not likely called in and is ok to take twice weekly.   - after infusion on 3/6, weight went from 215 -> 210 overnight. Started regaining weight yesterday.   - spoke with Nhung Fernandez for cardiology. Take Zaroxolyn today and     Asthma  - using Albuterol every day.  - last breathing treatment was several weeks ago.       When she goes to bed at night, she feels like she is choking. Started a few days ago.     Feels like she is about the same as every week.     Also c/o left leg weakness.   - would like a Rollator walker  - would like PT.   - not new. Related to her back/arthritis in lumbar spine.   - discussed LESI but has not been scheduled.  needs to call to check on status.   - pain appointment with Deysi Small PA-C  2/24/25 at

## 2025-03-10 NOTE — TELEPHONE ENCOUNTER
Uvaldo from [facility] is calling  they need DME order for small grab bar and a raised toliet seat---please fax to [phone] atn: Helene---Thanks.

## 2025-03-13 ENCOUNTER — TELEPHONE (OUTPATIENT)
Dept: ENDOCRINOLOGY | Age: 55
End: 2025-03-13
Payer: COMMERCIAL

## 2025-03-13 ENCOUNTER — TELEPHONE (OUTPATIENT)
Dept: CARDIOLOGY CLINIC | Age: 55
End: 2025-03-13

## 2025-03-13 ENCOUNTER — TELEPHONE (OUTPATIENT)
Dept: INTERNAL MEDICINE CLINIC | Age: 55
End: 2025-03-13

## 2025-03-13 ENCOUNTER — RESULTS FOLLOW-UP (OUTPATIENT)
Dept: ONCOLOGY | Age: 55
End: 2025-03-13

## 2025-03-13 ENCOUNTER — HOSPITAL ENCOUNTER (OUTPATIENT)
Dept: ONCOLOGY | Age: 55
Setting detail: INFUSION SERIES
Discharge: HOME | End: 2025-03-13
Payer: COMMERCIAL

## 2025-03-13 VITALS
OXYGEN SATURATION: 97 % | DIASTOLIC BLOOD PRESSURE: 65 MMHG | SYSTOLIC BLOOD PRESSURE: 125 MMHG | BODY MASS INDEX: 37.76 KG/M2 | HEART RATE: 73 BPM | TEMPERATURE: 96.5 F | RESPIRATION RATE: 18 BRPM | WEIGHT: 220 LBS

## 2025-03-13 DIAGNOSIS — E11.59 TYPE 2 DIABETES MELLITUS WITH OTHER CIRCULATORY COMPLICATION, WITH LONG-TERM CURRENT USE OF INSULIN: Primary | ICD-10-CM

## 2025-03-13 DIAGNOSIS — I50.32 CHRONIC DIASTOLIC CONGESTIVE HEART FAILURE (HCC): Primary | ICD-10-CM

## 2025-03-13 DIAGNOSIS — I50.32 CHRONIC HEART FAILURE WITH PRESERVED EJECTION FRACTION (HCC): ICD-10-CM

## 2025-03-13 DIAGNOSIS — Z79.4 TYPE 2 DIABETES MELLITUS WITH OTHER CIRCULATORY COMPLICATION, WITH LONG-TERM CURRENT USE OF INSULIN: Primary | ICD-10-CM

## 2025-03-13 DIAGNOSIS — R06.02 SHORTNESS OF BREATH: ICD-10-CM

## 2025-03-13 LAB
ANION GAP SERPL CALCULATED.3IONS-SCNC: 11 MMOL/L (ref 3–16)
BUN SERPL-MCNC: 44 MG/DL (ref 7–20)
CALCIUM SERPL-MCNC: 9 MG/DL (ref 8.3–10.6)
CHLORIDE SERPL-SCNC: 101 MMOL/L (ref 99–110)
CO2 SERPL-SCNC: 28 MMOL/L (ref 21–32)
CREAT SERPL-MCNC: 1.3 MG/DL (ref 0.6–1.1)
GFR SERPLBLD CREATININE-BSD FMLA CKD-EPI: 48 ML/MIN/{1.73_M2}
GLUCOSE BLD-MCNC: 167 MG/DL (ref 70–99)
GLUCOSE SERPL-MCNC: 63 MG/DL (ref 70–99)
NT-PROBNP SERPL-MCNC: 709 PG/ML (ref 0–124)
PERFORMED ON: ABNORMAL
POTASSIUM SERPL-SCNC: 4.2 MMOL/L (ref 3.5–5.1)
SODIUM SERPL-SCNC: 140 MMOL/L (ref 136–145)

## 2025-03-13 PROCEDURE — 96366 THER/PROPH/DIAG IV INF ADDON: CPT

## 2025-03-13 PROCEDURE — 99211 OFF/OP EST MAY X REQ PHY/QHP: CPT

## 2025-03-13 PROCEDURE — 95251 CONT GLUC MNTR ANALYSIS I&R: CPT | Performed by: INTERNAL MEDICINE

## 2025-03-13 PROCEDURE — 96365 THER/PROPH/DIAG IV INF INIT: CPT

## 2025-03-13 PROCEDURE — 80048 BASIC METABOLIC PNL TOTAL CA: CPT

## 2025-03-13 PROCEDURE — 96375 TX/PRO/DX INJ NEW DRUG ADDON: CPT

## 2025-03-13 PROCEDURE — 6360000002 HC RX W HCPCS: Performed by: INTERNAL MEDICINE

## 2025-03-13 PROCEDURE — 2580000003 HC RX 258: Performed by: INTERNAL MEDICINE

## 2025-03-13 PROCEDURE — 83880 ASSAY OF NATRIURETIC PEPTIDE: CPT

## 2025-03-13 RX ORDER — GLUCAGON 3 MG/1
POWDER NASAL
Qty: 2 EACH | Refills: 3 | Status: SHIPPED | OUTPATIENT
Start: 2025-03-13

## 2025-03-13 RX ORDER — FUROSEMIDE 10 MG/ML
120 INJECTION INTRAMUSCULAR; INTRAVENOUS ONCE
Status: CANCELLED
Start: 2025-03-20 | End: 2025-03-20

## 2025-03-13 RX ORDER — SODIUM CHLORIDE 0.9 % (FLUSH) 0.9 %
5-40 SYRINGE (ML) INJECTION ONCE
Status: CANCELLED
Start: 2025-03-20 | End: 2025-03-20

## 2025-03-13 RX ORDER — METOLAZONE 5 MG/1
5 TABLET ORAL
Qty: 60 TABLET | Refills: 3 | Status: CANCELLED | OUTPATIENT
Start: 2025-03-13

## 2025-03-13 RX ORDER — FUROSEMIDE 10 MG/ML
120 INJECTION INTRAMUSCULAR; INTRAVENOUS ONCE
Status: COMPLETED | OUTPATIENT
Start: 2025-03-13 | End: 2025-03-13

## 2025-03-13 RX ORDER — SODIUM CHLORIDE 0.9 % (FLUSH) 0.9 %
5-40 SYRINGE (ML) INJECTION ONCE
Status: DISCONTINUED | OUTPATIENT
Start: 2025-03-13 | End: 2025-03-14 | Stop reason: HOSPADM

## 2025-03-13 RX ORDER — METOLAZONE 5 MG/1
5 TABLET ORAL
Qty: 60 TABLET | Refills: 0 | Status: SHIPPED | OUTPATIENT
Start: 2025-03-13

## 2025-03-13 RX ADMIN — FUROSEMIDE 120 MG: 10 INJECTION, SOLUTION INTRAMUSCULAR; INTRAVENOUS at 09:59

## 2025-03-13 RX ADMIN — FUROSEMIDE 20 MG/HR: 10 INJECTION, SOLUTION INTRAVENOUS at 10:07

## 2025-03-13 NOTE — TELEPHONE ENCOUNTER
Patient's  is calling because he states he has requested this medication from [facility] 2/27/25 and [facility] told them they never received refill- Then he requested it from patient's PCP on 3/10/25- and [facility] said they never received.  He does not understand why the medication is not being sent, please call him to advise [phone]      Medication Refill    Medication needing refilled: metOLazone (ZAROXOLYN) 5 MG tablet     Dosage of the medication:  5mg    How are you taking this medication (QD, BID, TID, QID, PRN): Take 1 tablet by mouth Twice a Week Take one tablet on Thursdays 30 Minutes Before Lasix Infusion and one on Mondays 30min before torsemide     30 or 90 day supply called in: 90    When will you run out of your medication:    Which Pharmacy are we sending the medication to?:   [id number] - [facility] [address] - P [phone] - F [phone]   
Acute Appendicitis

## 2025-03-13 NOTE — PROGRESS NOTES
Pt ate peanut butter with apples and orange for blood sugar of 63. Pt states her glucose machine stated recheck was 54. RN checked FSBS, 167.

## 2025-03-13 NOTE — PROGRESS NOTES
Labs reviewed with Fabi ALLEN prior to administration  mg Lasix given over 8 minutes, followed by Lasix infusion 20 mg/hr over 4 hours completed, see MAR. Pt tolerated well, voided in bathroom multiple times during infusion. SaO2 97% with O2 at 2l/NC. . Pt discharged per wheel chair to car accompanied by .  To return next week for weekly IV labs and lasix.

## 2025-03-13 NOTE — TELEPHONE ENCOUNTER
Looks like it was ordered but not attached to a pharmacy.     Pended medication to requested pharmacy. Please send over

## 2025-03-13 NOTE — TELEPHONE ENCOUNTER
Pt's  calling, states [facility] did not receive metOLazone (ZAROXOLYN) 5 MG tablet. Asks to please send to [facility] on [address]. Aware Dr. Simental is out today.

## 2025-03-13 NOTE — TELEPHONE ENCOUNTER
Pt's  calling and states pt's blood sugar has been low. Its been dropping to 59. He states she's not taking any medications/no insulins for the last few weeks. He said in the past it use to be in the 500 so this is so different with it being so low. She will drink juice to bring it back up. He is asking for emergency glucagon to be sent to pharmacy    [facility] on [address]    CB# [address] () [phone]

## 2025-03-17 NOTE — TELEPHONE ENCOUNTER
Spoke to patient's  and confirmed he received voice mail Friday. They will bring Kashe reader to office for downloading Thursday morning.

## 2025-03-18 DIAGNOSIS — G44.221 CHRONIC TENSION-TYPE HEADACHE, INTRACTABLE: ICD-10-CM

## 2025-03-20 ENCOUNTER — RESULTS FOLLOW-UP (OUTPATIENT)
Dept: ONCOLOGY | Age: 55
End: 2025-03-20

## 2025-03-20 ENCOUNTER — HOSPITAL ENCOUNTER (OUTPATIENT)
Dept: ONCOLOGY | Age: 55
Setting detail: INFUSION SERIES
Discharge: HOME | End: 2025-03-20
Payer: COMMERCIAL

## 2025-03-20 VITALS
BODY MASS INDEX: 36.08 KG/M2 | TEMPERATURE: 96.9 F | HEART RATE: 66 BPM | RESPIRATION RATE: 18 BRPM | DIASTOLIC BLOOD PRESSURE: 58 MMHG | WEIGHT: 210.2 LBS | SYSTOLIC BLOOD PRESSURE: 104 MMHG

## 2025-03-20 DIAGNOSIS — I50.32 CHRONIC HEART FAILURE WITH PRESERVED EJECTION FRACTION (HCC): ICD-10-CM

## 2025-03-20 DIAGNOSIS — I50.32 CHRONIC DIASTOLIC CONGESTIVE HEART FAILURE (HCC): Primary | ICD-10-CM

## 2025-03-20 DIAGNOSIS — R06.02 SHORTNESS OF BREATH: ICD-10-CM

## 2025-03-20 LAB
ANION GAP SERPL CALCULATED.3IONS-SCNC: 10 MMOL/L (ref 3–16)
BUN SERPL-MCNC: 32 MG/DL (ref 7–20)
CALCIUM SERPL-MCNC: 9.5 MG/DL (ref 8.3–10.6)
CHLORIDE SERPL-SCNC: 98 MMOL/L (ref 99–110)
CO2 SERPL-SCNC: 32 MMOL/L (ref 21–32)
CREAT SERPL-MCNC: 1.2 MG/DL (ref 0.6–1.1)
GFR SERPLBLD CREATININE-BSD FMLA CKD-EPI: 53 ML/MIN/{1.73_M2}
GLUCOSE SERPL-MCNC: 84 MG/DL (ref 70–99)
NT-PROBNP SERPL-MCNC: 477 PG/ML (ref 0–124)
POTASSIUM SERPL-SCNC: 4.8 MMOL/L (ref 3.5–5.1)
SODIUM SERPL-SCNC: 140 MMOL/L (ref 136–145)

## 2025-03-20 PROCEDURE — 80048 BASIC METABOLIC PNL TOTAL CA: CPT

## 2025-03-20 PROCEDURE — 99211 OFF/OP EST MAY X REQ PHY/QHP: CPT

## 2025-03-20 PROCEDURE — 96361 HYDRATE IV INFUSION ADD-ON: CPT

## 2025-03-20 PROCEDURE — 96375 TX/PRO/DX INJ NEW DRUG ADDON: CPT

## 2025-03-20 PROCEDURE — 96374 THER/PROPH/DIAG INJ IV PUSH: CPT

## 2025-03-20 PROCEDURE — 96365 THER/PROPH/DIAG IV INF INIT: CPT

## 2025-03-20 PROCEDURE — 83880 ASSAY OF NATRIURETIC PEPTIDE: CPT

## 2025-03-20 PROCEDURE — 2580000003 HC RX 258: Performed by: INTERNAL MEDICINE

## 2025-03-20 PROCEDURE — 6360000002 HC RX W HCPCS: Performed by: INTERNAL MEDICINE

## 2025-03-20 RX ORDER — METOLAZONE 5 MG/1
5 TABLET ORAL
Qty: 30 TABLET | Refills: 1 | Status: SHIPPED | OUTPATIENT
Start: 2025-03-20

## 2025-03-20 RX ORDER — FUROSEMIDE 10 MG/ML
120 INJECTION INTRAMUSCULAR; INTRAVENOUS ONCE
Status: CANCELLED
Start: 2025-03-27 | End: 2025-03-27

## 2025-03-20 RX ORDER — FUROSEMIDE 10 MG/ML
120 INJECTION INTRAMUSCULAR; INTRAVENOUS ONCE
Status: COMPLETED | OUTPATIENT
Start: 2025-03-20 | End: 2025-03-20

## 2025-03-20 RX ORDER — SODIUM CHLORIDE 0.9 % (FLUSH) 0.9 %
5-40 SYRINGE (ML) INJECTION ONCE
Status: DISCONTINUED | OUTPATIENT
Start: 2025-03-20 | End: 2025-03-21 | Stop reason: HOSPADM

## 2025-03-20 RX ORDER — SODIUM CHLORIDE 0.9 % (FLUSH) 0.9 %
5-40 SYRINGE (ML) INJECTION ONCE
Status: CANCELLED
Start: 2025-03-27 | End: 2025-03-27

## 2025-03-20 RX ADMIN — FUROSEMIDE 120 MG: 10 INJECTION, SOLUTION INTRAMUSCULAR; INTRAVENOUS at 10:31

## 2025-03-20 RX ADMIN — FUROSEMIDE 20 MG/HR: 10 INJECTION, SOLUTION INTRAMUSCULAR; INTRAVENOUS at 10:38

## 2025-03-20 NOTE — PROGRESS NOTES
Pt to Dept for Lasix IVP and Lasix gtt as ordered from Dr. Parsons. Weight obtained and Labs drawn this AM. Results reviewed per Nhung Fernandez CNP ,Ok to proceed with Tx as ordered today. Pt took Metolazone as ordered on Mon and this morning before Appt.  Lasix 120mg given IVP over 6 minutes followed by Lasix gtt started at 20mg/hr for 4 hours. IVP lasix and lasix gtt started per Nicholas OVIEDO RN . Pt scheduled to return next Thursday for same treatment. Pt discharged home at this time per W/C with Home O2 with .

## 2025-03-20 NOTE — TELEPHONE ENCOUNTER
Diabetes Continuous Glucose Monitoring Report         Reason for Study:     - improve diabetic control without risk of hypoglycemia       Current Medication regimen:      CGMS Report     CGMS data collection was performed on 3/20/2025  Patient provided information on her  diet, activities and insulin dosing  during this period.   Data was available for 7+ days     Sensor Data Report:   - 12 AM to 6 AM: Overnight blood glucose pattern shows stable glycemia  - 6   AM to 10 AM:  Post breakfast hyperglycemia is noted  --10AM to 5 PM : No hypoglycemia observed during this time.  - 5   PM to 8 PM: Post meal hyperglycemia is noted       Average reading 162 mg/dL     Standard Dev/coefficient of variation 49.5    % of time <70 mg/dL  16  %   % of time >180  mg/dL  18  %   % of time within range   57%  Number of hypoglycemia episodes noted: 0     Impression:   CGMS shows stable glycemia overnight with postprandial hyperglycemia     Recommendation:      Patient was advised to make the following changes in her diabetic regimen    Take meal boluses 10 minutes prior to eating her Iftaar at night

## 2025-03-21 RX ORDER — BUTALBITAL, ACETAMINOPHEN AND CAFFEINE 50; 325; 40 MG/1; MG/1; MG/1
1 TABLET ORAL EVERY 6 HOURS PRN
Qty: 30 TABLET | Refills: 1 | Status: SHIPPED | OUTPATIENT
Start: 2025-03-21

## 2025-03-24 RX ORDER — SKIN PROTECTANT 44 G/100G
OINTMENT TOPICAL
Qty: 424 G | Refills: 0 | OUTPATIENT
Start: 2025-03-24

## 2025-03-24 RX ORDER — SKIN PROTECTANT 44 G/100G
OINTMENT TOPICAL
Qty: 424 G | Refills: 3 | Status: SHIPPED | OUTPATIENT
Start: 2025-03-24

## 2025-03-26 DIAGNOSIS — K21.9 HIATAL HERNIA WITH GERD: ICD-10-CM

## 2025-03-26 DIAGNOSIS — K44.9 HIATAL HERNIA WITH GERD: ICD-10-CM

## 2025-03-27 ENCOUNTER — RESULTS FOLLOW-UP (OUTPATIENT)
Dept: CARDIOLOGY CLINIC | Age: 55
End: 2025-03-27

## 2025-03-27 ENCOUNTER — HOSPITAL ENCOUNTER (OUTPATIENT)
Dept: ONCOLOGY | Age: 55
Setting detail: INFUSION SERIES
Discharge: HOME | End: 2025-03-27
Payer: COMMERCIAL

## 2025-03-27 VITALS
OXYGEN SATURATION: 95 % | DIASTOLIC BLOOD PRESSURE: 85 MMHG | HEART RATE: 73 BPM | RESPIRATION RATE: 16 BRPM | BODY MASS INDEX: 34.95 KG/M2 | SYSTOLIC BLOOD PRESSURE: 125 MMHG | TEMPERATURE: 97 F | WEIGHT: 203.6 LBS

## 2025-03-27 DIAGNOSIS — R06.02 SHORTNESS OF BREATH: ICD-10-CM

## 2025-03-27 DIAGNOSIS — I50.32 CHRONIC HEART FAILURE WITH PRESERVED EJECTION FRACTION (HCC): ICD-10-CM

## 2025-03-27 DIAGNOSIS — I50.32 CHRONIC DIASTOLIC CONGESTIVE HEART FAILURE (HCC): Primary | ICD-10-CM

## 2025-03-27 LAB
ANION GAP SERPL CALCULATED.3IONS-SCNC: 12 MMOL/L (ref 3–16)
BUN SERPL-MCNC: 28 MG/DL (ref 7–20)
CALCIUM SERPL-MCNC: 9.5 MG/DL (ref 8.3–10.6)
CHLORIDE SERPL-SCNC: 94 MMOL/L (ref 99–110)
CO2 SERPL-SCNC: 33 MMOL/L (ref 21–32)
CREAT SERPL-MCNC: 1.3 MG/DL (ref 0.6–1.1)
GFR SERPLBLD CREATININE-BSD FMLA CKD-EPI: 48 ML/MIN/{1.73_M2}
GLUCOSE SERPL-MCNC: 75 MG/DL (ref 70–99)
NT-PROBNP SERPL-MCNC: 294 PG/ML (ref 0–124)
POTASSIUM SERPL-SCNC: 5.1 MMOL/L (ref 3.5–5.1)
SODIUM SERPL-SCNC: 139 MMOL/L (ref 136–145)

## 2025-03-27 PROCEDURE — 80048 BASIC METABOLIC PNL TOTAL CA: CPT

## 2025-03-27 PROCEDURE — 2580000003 HC RX 258: Performed by: INTERNAL MEDICINE

## 2025-03-27 PROCEDURE — 99211 OFF/OP EST MAY X REQ PHY/QHP: CPT

## 2025-03-27 PROCEDURE — 83880 ASSAY OF NATRIURETIC PEPTIDE: CPT

## 2025-03-27 PROCEDURE — 2500000003 HC RX 250 WO HCPCS: Performed by: INTERNAL MEDICINE

## 2025-03-27 PROCEDURE — 6360000002 HC RX W HCPCS: Performed by: INTERNAL MEDICINE

## 2025-03-27 PROCEDURE — 96365 THER/PROPH/DIAG IV INF INIT: CPT

## 2025-03-27 PROCEDURE — 96366 THER/PROPH/DIAG IV INF ADDON: CPT

## 2025-03-27 PROCEDURE — 96374 THER/PROPH/DIAG INJ IV PUSH: CPT

## 2025-03-27 RX ORDER — FUROSEMIDE 10 MG/ML
120 INJECTION INTRAMUSCULAR; INTRAVENOUS ONCE
Status: COMPLETED | OUTPATIENT
Start: 2025-03-27 | End: 2025-03-27

## 2025-03-27 RX ORDER — FUROSEMIDE 10 MG/ML
120 INJECTION INTRAMUSCULAR; INTRAVENOUS ONCE
Status: CANCELLED
Start: 2025-04-03 | End: 2025-04-03

## 2025-03-27 RX ORDER — SODIUM CHLORIDE 0.9 % (FLUSH) 0.9 %
5-40 SYRINGE (ML) INJECTION ONCE
Status: COMPLETED | OUTPATIENT
Start: 2025-03-27 | End: 2025-03-27

## 2025-03-27 RX ORDER — SODIUM CHLORIDE 0.9 % (FLUSH) 0.9 %
5-40 SYRINGE (ML) INJECTION ONCE
Status: CANCELLED
Start: 2025-04-03 | End: 2025-04-03

## 2025-03-27 RX ORDER — OMEPRAZOLE 20 MG/1
20 CAPSULE, DELAYED RELEASE ORAL 2 TIMES DAILY
Qty: 180 CAPSULE | Refills: 1 | Status: SHIPPED | OUTPATIENT
Start: 2025-03-27

## 2025-03-27 RX ADMIN — FUROSEMIDE 120 MG: 10 INJECTION, SOLUTION INTRAMUSCULAR; INTRAVENOUS at 10:19

## 2025-03-27 RX ADMIN — SODIUM CHLORIDE, PRESERVATIVE FREE 10 ML: 5 INJECTION INTRAVENOUS at 09:33

## 2025-03-27 RX ADMIN — FUROSEMIDE 20 MG/HR: 10 INJECTION, SOLUTION INTRAMUSCULAR; INTRAVENOUS at 10:29

## 2025-04-03 ENCOUNTER — RESULTS FOLLOW-UP (OUTPATIENT)
Dept: CARDIOLOGY CLINIC | Age: 55
End: 2025-04-03

## 2025-04-03 ENCOUNTER — HOSPITAL ENCOUNTER (OUTPATIENT)
Dept: ONCOLOGY | Age: 55
Setting detail: INFUSION SERIES
Discharge: HOME | End: 2025-04-03
Payer: COMMERCIAL

## 2025-04-03 ENCOUNTER — CLINICAL DOCUMENTATION (OUTPATIENT)
Dept: ONCOLOGY | Age: 55
End: 2025-04-03

## 2025-04-03 VITALS
HEART RATE: 64 BPM | RESPIRATION RATE: 16 BRPM | SYSTOLIC BLOOD PRESSURE: 105 MMHG | TEMPERATURE: 97 F | DIASTOLIC BLOOD PRESSURE: 54 MMHG

## 2025-04-03 DIAGNOSIS — R06.02 SHORTNESS OF BREATH: ICD-10-CM

## 2025-04-03 DIAGNOSIS — I50.32 CHRONIC HEART FAILURE WITH PRESERVED EJECTION FRACTION (HCC): ICD-10-CM

## 2025-04-03 DIAGNOSIS — I50.32 CHRONIC DIASTOLIC CONGESTIVE HEART FAILURE (HCC): Primary | ICD-10-CM

## 2025-04-03 LAB
ANION GAP SERPL CALCULATED.3IONS-SCNC: 10 MMOL/L (ref 3–16)
BUN SERPL-MCNC: 42 MG/DL (ref 7–20)
CALCIUM SERPL-MCNC: 9.1 MG/DL (ref 8.3–10.6)
CHLORIDE SERPL-SCNC: 99 MMOL/L (ref 99–110)
CO2 SERPL-SCNC: 33 MMOL/L (ref 21–32)
CREAT SERPL-MCNC: 1.4 MG/DL (ref 0.6–1.1)
GFR SERPLBLD CREATININE-BSD FMLA CKD-EPI: 44 ML/MIN/{1.73_M2}
GLUCOSE SERPL-MCNC: 138 MG/DL (ref 70–99)
NT-PROBNP SERPL-MCNC: 214 PG/ML (ref 0–124)
POTASSIUM SERPL-SCNC: 3.5 MMOL/L (ref 3.5–5.1)
SODIUM SERPL-SCNC: 142 MMOL/L (ref 136–145)

## 2025-04-03 PROCEDURE — 96365 THER/PROPH/DIAG IV INF INIT: CPT

## 2025-04-03 PROCEDURE — 99211 OFF/OP EST MAY X REQ PHY/QHP: CPT

## 2025-04-03 PROCEDURE — 96366 THER/PROPH/DIAG IV INF ADDON: CPT

## 2025-04-03 PROCEDURE — 2500000003 HC RX 250 WO HCPCS: Performed by: INTERNAL MEDICINE

## 2025-04-03 PROCEDURE — 80048 BASIC METABOLIC PNL TOTAL CA: CPT

## 2025-04-03 PROCEDURE — 2580000003 HC RX 258: Performed by: INTERNAL MEDICINE

## 2025-04-03 PROCEDURE — 83880 ASSAY OF NATRIURETIC PEPTIDE: CPT

## 2025-04-03 PROCEDURE — 6360000002 HC RX W HCPCS: Performed by: INTERNAL MEDICINE

## 2025-04-03 PROCEDURE — 96375 TX/PRO/DX INJ NEW DRUG ADDON: CPT

## 2025-04-03 RX ORDER — FUROSEMIDE 10 MG/ML
120 INJECTION INTRAMUSCULAR; INTRAVENOUS ONCE
Start: 2025-04-10 | End: 2025-04-10

## 2025-04-03 RX ORDER — SODIUM CHLORIDE 0.9 % (FLUSH) 0.9 %
5-40 SYRINGE (ML) INJECTION ONCE
Status: COMPLETED | OUTPATIENT
Start: 2025-04-03 | End: 2025-04-03

## 2025-04-03 RX ORDER — FUROSEMIDE 10 MG/ML
120 INJECTION INTRAMUSCULAR; INTRAVENOUS ONCE
Status: COMPLETED | OUTPATIENT
Start: 2025-04-03 | End: 2025-04-03

## 2025-04-03 RX ORDER — SODIUM CHLORIDE 0.9 % (FLUSH) 0.9 %
5-40 SYRINGE (ML) INJECTION ONCE
Start: 2025-04-10 | End: 2025-04-10

## 2025-04-03 RX ADMIN — SODIUM CHLORIDE, PRESERVATIVE FREE 10 ML: 5 INJECTION INTRAVENOUS at 10:00

## 2025-04-03 RX ADMIN — FUROSEMIDE 120 MG: 10 INJECTION, SOLUTION INTRAMUSCULAR; INTRAVENOUS at 09:56

## 2025-04-03 RX ADMIN — FUROSEMIDE 20 MG/HR: 10 INJECTION, SOLUTION INTRAMUSCULAR; INTRAVENOUS at 10:03

## 2025-04-03 NOTE — PROGRESS NOTES
Labs reviewed with Fabi ALLEN prior to administration  mg Lasix given over 6 minutes, followed by Lasix infusion 20 mg/hr over 4 hours completed, see MAR. Pt tolerated well, voided in restroom x3 SaO2 99% with O2 at 2l/NC. . Pt discharged per wheel chair to car accompanied by .  To return next week for weekly IV labs and lasix.

## 2025-04-10 ENCOUNTER — RESULTS FOLLOW-UP (OUTPATIENT)
Dept: CARDIOLOGY CLINIC | Age: 55
End: 2025-04-10

## 2025-04-10 ENCOUNTER — HOSPITAL ENCOUNTER (OUTPATIENT)
Dept: ONCOLOGY | Age: 55
Setting detail: INFUSION SERIES
Discharge: HOME | End: 2025-04-10
Payer: COMMERCIAL

## 2025-04-10 VITALS
SYSTOLIC BLOOD PRESSURE: 120 MMHG | WEIGHT: 201.06 LBS | HEART RATE: 58 BPM | TEMPERATURE: 96.8 F | DIASTOLIC BLOOD PRESSURE: 69 MMHG | BODY MASS INDEX: 34.51 KG/M2 | RESPIRATION RATE: 16 BRPM | OXYGEN SATURATION: 99 %

## 2025-04-10 DIAGNOSIS — I50.32 CHRONIC DIASTOLIC CONGESTIVE HEART FAILURE (HCC): Primary | ICD-10-CM

## 2025-04-10 DIAGNOSIS — I50.32 CHRONIC HEART FAILURE WITH PRESERVED EJECTION FRACTION (HCC): ICD-10-CM

## 2025-04-10 DIAGNOSIS — R06.02 SHORTNESS OF BREATH: ICD-10-CM

## 2025-04-10 LAB
ANION GAP SERPL CALCULATED.3IONS-SCNC: 9 MMOL/L (ref 3–16)
BUN SERPL-MCNC: 35 MG/DL (ref 7–20)
CALCIUM SERPL-MCNC: 8.8 MG/DL (ref 8.3–10.6)
CHLORIDE SERPL-SCNC: 99 MMOL/L (ref 99–110)
CO2 SERPL-SCNC: 30 MMOL/L (ref 21–32)
CREAT SERPL-MCNC: 1.2 MG/DL (ref 0.6–1.1)
GFR SERPLBLD CREATININE-BSD FMLA CKD-EPI: 53 ML/MIN/{1.73_M2}
GLUCOSE SERPL-MCNC: 214 MG/DL (ref 70–99)
NT-PROBNP SERPL-MCNC: 270 PG/ML (ref 0–124)
POTASSIUM SERPL-SCNC: 4.3 MMOL/L (ref 3.5–5.1)
SODIUM SERPL-SCNC: 138 MMOL/L (ref 136–145)

## 2025-04-10 PROCEDURE — 99211 OFF/OP EST MAY X REQ PHY/QHP: CPT

## 2025-04-10 PROCEDURE — 2580000003 HC RX 258: Performed by: INTERNAL MEDICINE

## 2025-04-10 PROCEDURE — 96365 THER/PROPH/DIAG IV INF INIT: CPT

## 2025-04-10 PROCEDURE — 80048 BASIC METABOLIC PNL TOTAL CA: CPT

## 2025-04-10 PROCEDURE — 6360000002 HC RX W HCPCS: Performed by: INTERNAL MEDICINE

## 2025-04-10 PROCEDURE — 2500000003 HC RX 250 WO HCPCS: Performed by: INTERNAL MEDICINE

## 2025-04-10 PROCEDURE — 96366 THER/PROPH/DIAG IV INF ADDON: CPT

## 2025-04-10 PROCEDURE — 96375 TX/PRO/DX INJ NEW DRUG ADDON: CPT

## 2025-04-10 PROCEDURE — 96374 THER/PROPH/DIAG INJ IV PUSH: CPT

## 2025-04-10 PROCEDURE — 83880 ASSAY OF NATRIURETIC PEPTIDE: CPT

## 2025-04-10 RX ORDER — SODIUM CHLORIDE 0.9 % (FLUSH) 0.9 %
5-40 SYRINGE (ML) INJECTION ONCE
Status: CANCELLED
Start: 2025-04-17 | End: 2025-04-17

## 2025-04-10 RX ORDER — FUROSEMIDE 10 MG/ML
120 INJECTION INTRAMUSCULAR; INTRAVENOUS ONCE
Status: CANCELLED
Start: 2025-04-17 | End: 2025-04-17

## 2025-04-10 RX ORDER — SODIUM CHLORIDE 0.9 % (FLUSH) 0.9 %
5-40 SYRINGE (ML) INJECTION ONCE
Status: COMPLETED | OUTPATIENT
Start: 2025-04-10 | End: 2025-04-10

## 2025-04-10 RX ORDER — FUROSEMIDE 10 MG/ML
120 INJECTION INTRAMUSCULAR; INTRAVENOUS ONCE
Status: COMPLETED | OUTPATIENT
Start: 2025-04-10 | End: 2025-04-10

## 2025-04-10 RX ORDER — LUBIPROSTONE 24 UG/1
CAPSULE ORAL
Qty: 180 CAPSULE | Refills: 1 | Status: SHIPPED | OUTPATIENT
Start: 2025-04-10

## 2025-04-10 RX ADMIN — FUROSEMIDE 120 MG: 10 INJECTION, SOLUTION INTRAMUSCULAR; INTRAVENOUS at 10:36

## 2025-04-10 RX ADMIN — FUROSEMIDE 20 MG/HR: 10 INJECTION, SOLUTION INTRAMUSCULAR; INTRAVENOUS at 10:54

## 2025-04-10 RX ADMIN — Medication 10 ML: at 10:12

## 2025-04-10 NOTE — PROGRESS NOTES
Labs had been reviewed with Fabi ALLEN prior to administration  mg Lasix given over 6 minutes, followed by Lasix infusion 20 mg/hr over 4 hours completed, see MAR. Pt tolerated well, voided in restroom x3 SaO2 99% with O2 at 2l/NC. . Pt discharged per wheel chair to car accompanied by .  To return next week for weekly IV labs and lasix.

## 2025-04-10 NOTE — TELEPHONE ENCOUNTER
Last OV: 3/10/2025  Next OV: 5/27/2025    Next appointment due:5/10/2025    Last fill:12/05/2025  Refills:0    OK to fill

## 2025-04-15 NOTE — PROGRESS NOTES
Pt fully awake and symptoms subsided. Pt assisted to wheel chair then to bathroom accompanied by nurse,CED Carrero. Then pt assisted back to bay and chair. T expresses that she feels better. Blood sugar repeated 110 per accu check meter.   U/S COMPLETE

## 2025-04-17 ENCOUNTER — RESULTS FOLLOW-UP (OUTPATIENT)
Dept: CARDIOLOGY CLINIC | Age: 55
End: 2025-04-17

## 2025-04-17 ENCOUNTER — HOSPITAL ENCOUNTER (OUTPATIENT)
Dept: ONCOLOGY | Age: 55
Setting detail: INFUSION SERIES
Discharge: HOME | End: 2025-04-17
Payer: COMMERCIAL

## 2025-04-17 VITALS
DIASTOLIC BLOOD PRESSURE: 64 MMHG | RESPIRATION RATE: 18 BRPM | SYSTOLIC BLOOD PRESSURE: 123 MMHG | WEIGHT: 207 LBS | HEART RATE: 66 BPM | TEMPERATURE: 96.8 F | BODY MASS INDEX: 35.53 KG/M2

## 2025-04-17 DIAGNOSIS — I50.32 CHRONIC DIASTOLIC CONGESTIVE HEART FAILURE (HCC): Primary | ICD-10-CM

## 2025-04-17 DIAGNOSIS — Z79.4 TYPE 2 DIABETES MELLITUS WITH OTHER CIRCULATORY COMPLICATION, WITH LONG-TERM CURRENT USE OF INSULIN (HCC): ICD-10-CM

## 2025-04-17 DIAGNOSIS — I50.32 CHRONIC HEART FAILURE WITH PRESERVED EJECTION FRACTION (HCC): ICD-10-CM

## 2025-04-17 DIAGNOSIS — I25.10 CORONARY ARTERY DISEASE DUE TO LIPID RICH PLAQUE: Chronic | ICD-10-CM

## 2025-04-17 DIAGNOSIS — I25.83 CORONARY ARTERY DISEASE DUE TO LIPID RICH PLAQUE: Chronic | ICD-10-CM

## 2025-04-17 DIAGNOSIS — E11.59 TYPE 2 DIABETES MELLITUS WITH OTHER CIRCULATORY COMPLICATION, WITH LONG-TERM CURRENT USE OF INSULIN (HCC): ICD-10-CM

## 2025-04-17 DIAGNOSIS — R06.02 SHORTNESS OF BREATH: ICD-10-CM

## 2025-04-17 DIAGNOSIS — Z85.3 HISTORY OF BREAST CANCER: ICD-10-CM

## 2025-04-17 LAB
ALBUMIN SERPL-MCNC: 3.8 G/DL (ref 3.4–5)
ALBUMIN/GLOB SERPL: 1 {RATIO} (ref 1.1–2.2)
ALP SERPL-CCNC: 118 U/L (ref 40–129)
ALT SERPL-CCNC: 12 U/L (ref 10–40)
ANION GAP SERPL CALCULATED.3IONS-SCNC: 12 MMOL/L (ref 3–16)
AST SERPL-CCNC: 21 U/L (ref 15–37)
BILIRUB SERPL-MCNC: <0.2 MG/DL (ref 0–1)
BUN SERPL-MCNC: 52 MG/DL (ref 7–20)
CALCIUM SERPL-MCNC: 9.3 MG/DL (ref 8.3–10.6)
CHLORIDE SERPL-SCNC: 96 MMOL/L (ref 99–110)
CHOLEST SERPL-MCNC: 120 MG/DL (ref 0–199)
CO2 SERPL-SCNC: 31 MMOL/L (ref 21–32)
CREAT SERPL-MCNC: 1.5 MG/DL (ref 0.6–1.1)
CREAT UR-MCNC: 24.8 MG/DL (ref 28–259)
EST. AVERAGE GLUCOSE BLD GHB EST-MCNC: 154.2 MG/DL
GFR SERPLBLD CREATININE-BSD FMLA CKD-EPI: 41 ML/MIN/{1.73_M2}
GLUCOSE SERPL-MCNC: 139 MG/DL (ref 70–99)
HBA1C MFR BLD: 7 %
HDLC SERPL-MCNC: 34 MG/DL (ref 40–60)
LDLC SERPL CALC-MCNC: 50 MG/DL
MICROALBUMIN UR DL<=1MG/L-MCNC: <1.2 MG/DL
MICROALBUMIN/CREAT UR: ABNORMAL MG/G (ref 0–30)
NT-PROBNP SERPL-MCNC: 351 PG/ML (ref 0–124)
POTASSIUM SERPL-SCNC: 4 MMOL/L (ref 3.5–5.1)
PROT SERPL-MCNC: 7.7 G/DL (ref 6.4–8.2)
SODIUM SERPL-SCNC: 139 MMOL/L (ref 136–145)
TRIGL SERPL-MCNC: 182 MG/DL (ref 0–150)
TSH SERPL DL<=0.005 MIU/L-ACNC: 1.55 UIU/ML (ref 0.27–4.2)
VLDLC SERPL CALC-MCNC: 36 MG/DL

## 2025-04-17 PROCEDURE — 82570 ASSAY OF URINE CREATININE: CPT

## 2025-04-17 PROCEDURE — 80053 COMPREHEN METABOLIC PANEL: CPT

## 2025-04-17 PROCEDURE — 2580000003 HC RX 258: Performed by: INTERNAL MEDICINE

## 2025-04-17 PROCEDURE — 6360000002 HC RX W HCPCS: Performed by: INTERNAL MEDICINE

## 2025-04-17 PROCEDURE — 83036 HEMOGLOBIN GLYCOSYLATED A1C: CPT

## 2025-04-17 PROCEDURE — 96366 THER/PROPH/DIAG IV INF ADDON: CPT

## 2025-04-17 PROCEDURE — 84443 ASSAY THYROID STIM HORMONE: CPT

## 2025-04-17 PROCEDURE — 96375 TX/PRO/DX INJ NEW DRUG ADDON: CPT

## 2025-04-17 PROCEDURE — 96365 THER/PROPH/DIAG IV INF INIT: CPT

## 2025-04-17 PROCEDURE — 99211 OFF/OP EST MAY X REQ PHY/QHP: CPT

## 2025-04-17 PROCEDURE — 80061 LIPID PANEL: CPT

## 2025-04-17 PROCEDURE — 82043 UR ALBUMIN QUANTITATIVE: CPT

## 2025-04-17 PROCEDURE — 83880 ASSAY OF NATRIURETIC PEPTIDE: CPT

## 2025-04-17 PROCEDURE — 2500000003 HC RX 250 WO HCPCS: Performed by: INTERNAL MEDICINE

## 2025-04-17 RX ORDER — FUROSEMIDE 10 MG/ML
120 INJECTION INTRAMUSCULAR; INTRAVENOUS ONCE
Status: CANCELLED
Start: 2025-04-24 | End: 2025-04-24

## 2025-04-17 RX ORDER — SODIUM CHLORIDE 0.9 % (FLUSH) 0.9 %
5-40 SYRINGE (ML) INJECTION ONCE
Status: COMPLETED | OUTPATIENT
Start: 2025-04-17 | End: 2025-04-17

## 2025-04-17 RX ORDER — SODIUM CHLORIDE 0.9 % (FLUSH) 0.9 %
5-40 SYRINGE (ML) INJECTION ONCE
Status: CANCELLED
Start: 2025-04-24 | End: 2025-04-24

## 2025-04-17 RX ORDER — FUROSEMIDE 10 MG/ML
120 INJECTION INTRAMUSCULAR; INTRAVENOUS ONCE
Status: COMPLETED | OUTPATIENT
Start: 2025-04-17 | End: 2025-04-17

## 2025-04-17 RX ADMIN — FUROSEMIDE 120 MG: 10 INJECTION, SOLUTION INTRAMUSCULAR; INTRAVENOUS at 10:37

## 2025-04-17 RX ADMIN — Medication 10 ML: at 09:45

## 2025-04-17 RX ADMIN — FUROSEMIDE 20 MG/HR: 10 INJECTION, SOLUTION INTRAMUSCULAR; INTRAVENOUS at 10:42

## 2025-04-17 NOTE — PROGRESS NOTES
Labs had been reviewed with Fabi ALLEN prior to administration  mg Lasix given over 6 minutes, followed by Lasix infusion 20 mg/hr over 4 hours completed, see MAR. Pt tolerated well, voided in restroom x2. . Pt discharged per wheel chair to car accompanied by .  To return next week for weekly IV labs and lasix.

## 2025-04-22 ENCOUNTER — OFFICE VISIT (OUTPATIENT)
Dept: ENDOCRINOLOGY | Age: 55
End: 2025-04-22
Payer: COMMERCIAL

## 2025-04-22 VITALS
HEIGHT: 64 IN | SYSTOLIC BLOOD PRESSURE: 136 MMHG | BODY MASS INDEX: 36.02 KG/M2 | WEIGHT: 211 LBS | HEART RATE: 65 BPM | TEMPERATURE: 98 F | RESPIRATION RATE: 14 BRPM | DIASTOLIC BLOOD PRESSURE: 82 MMHG

## 2025-04-22 DIAGNOSIS — Z85.3 HISTORY OF BREAST CANCER: ICD-10-CM

## 2025-04-22 DIAGNOSIS — Z79.4 TYPE 2 DIABETES MELLITUS WITH OTHER CIRCULATORY COMPLICATION, WITH LONG-TERM CURRENT USE OF INSULIN (HCC): Primary | ICD-10-CM

## 2025-04-22 DIAGNOSIS — E03.2 HYPOTHYROIDISM DUE TO MEDICATION: ICD-10-CM

## 2025-04-22 DIAGNOSIS — E11.59 TYPE 2 DIABETES MELLITUS WITH OTHER CIRCULATORY COMPLICATION, WITH LONG-TERM CURRENT USE OF INSULIN (HCC): Primary | ICD-10-CM

## 2025-04-22 DIAGNOSIS — E55.9 VITAMIN D DEFICIENCY: ICD-10-CM

## 2025-04-22 DIAGNOSIS — I50.32 CHRONIC HEART FAILURE WITH PRESERVED EJECTION FRACTION (HCC): ICD-10-CM

## 2025-04-22 PROCEDURE — G8417 CALC BMI ABV UP PARAM F/U: HCPCS | Performed by: INTERNAL MEDICINE

## 2025-04-22 PROCEDURE — 3017F COLORECTAL CA SCREEN DOC REV: CPT | Performed by: INTERNAL MEDICINE

## 2025-04-22 PROCEDURE — 2022F DILAT RTA XM EVC RTNOPTHY: CPT | Performed by: INTERNAL MEDICINE

## 2025-04-22 PROCEDURE — G8427 DOCREV CUR MEDS BY ELIG CLIN: HCPCS | Performed by: INTERNAL MEDICINE

## 2025-04-22 PROCEDURE — 3079F DIAST BP 80-89 MM HG: CPT | Performed by: INTERNAL MEDICINE

## 2025-04-22 PROCEDURE — 1036F TOBACCO NON-USER: CPT | Performed by: INTERNAL MEDICINE

## 2025-04-22 PROCEDURE — 3075F SYST BP GE 130 - 139MM HG: CPT | Performed by: INTERNAL MEDICINE

## 2025-04-22 PROCEDURE — 95251 CONT GLUC MNTR ANALYSIS I&R: CPT | Performed by: INTERNAL MEDICINE

## 2025-04-22 PROCEDURE — 99214 OFFICE O/P EST MOD 30 MIN: CPT | Performed by: INTERNAL MEDICINE

## 2025-04-22 PROCEDURE — 3051F HG A1C>EQUAL 7.0%<8.0%: CPT | Performed by: INTERNAL MEDICINE

## 2025-04-22 NOTE — PROGRESS NOTES
Crow Bonner is a 55 y.o. female is seen for the management of uncontrolled  type 2 diabetes and hypothyroidism .  Patient has complex medical problems including coronary artery disease , DOMENIC, CHF, breast cancer, fibromyalgia, hyperlipidemia, hypertension, obesity, asthma and depression  T2DM which is uncontrolled and is complicated with nephropathy and DOMENIC .  She got diabetic education in the past and at one point she was on an insulin pump.  She still has hypoglycemia awareness tends to watch her diet somewhat but her depression hinders in managing her diabetes.  Most of her medications are managed by her .  CROW BONNER was diagnosed with hypothyroidism in march 2010 and has been on Lt4 since then   she had GDM with her 2 kids ---she has been on insulin for years      Crow also has hypertension , GERD , hyperlipidemia ,she also has breast cancer s/p mastectomy and radiation and chemo &is on Tamixifen   she also has Asthma she is on Cpap for sleep apnea and wears Oxygen   She follows with a rheumatologist and is on hydroxychloroquine, previously was on methotrexate  Left hand is severely swollen , previously she has been to the lymphedema clinic with no significant improvement and currently undergoing treatment for worsening CHF as per cardiology      INTERIM:      In Jan 2025 she started having lows and went days without taking any insulin, patient is a poor historian so as per  she was not taking any GLP at that time but prior to that she was on Trulicity and her Mounjaro was never approved   Having lows occasionally during the day but after dinner has significantly high glucose noted.  Diabetes control has worsened weight has been stable      Past Medical History:   Diagnosis Date    Acute CVA (cerebrovascular accident) ([facility]) 03/09/2023    Anxiety     Anxiety and depression     Arthritis     not sure of specific type    Asthma     CAD (coronary artery disease)     Cancer ([facility]) 2007    left

## 2025-04-23 ENCOUNTER — OFFICE VISIT (OUTPATIENT)
Dept: PULMONOLOGY | Age: 55
End: 2025-04-23
Payer: COMMERCIAL

## 2025-04-23 VITALS
OXYGEN SATURATION: 97 % | DIASTOLIC BLOOD PRESSURE: 76 MMHG | SYSTOLIC BLOOD PRESSURE: 122 MMHG | HEART RATE: 61 BPM | BODY MASS INDEX: 35.61 KG/M2 | HEIGHT: 64 IN | WEIGHT: 208.6 LBS

## 2025-04-23 DIAGNOSIS — G47.33 OSA (OBSTRUCTIVE SLEEP APNEA): Primary | ICD-10-CM

## 2025-04-23 DIAGNOSIS — J45.30 MILD PERSISTENT ASTHMA WITHOUT COMPLICATION: ICD-10-CM

## 2025-04-23 DIAGNOSIS — J96.11 CHRONIC RESPIRATORY FAILURE WITH HYPOXIA (HCC): ICD-10-CM

## 2025-04-23 PROCEDURE — G8427 DOCREV CUR MEDS BY ELIG CLIN: HCPCS | Performed by: INTERNAL MEDICINE

## 2025-04-23 PROCEDURE — G2211 COMPLEX E/M VISIT ADD ON: HCPCS | Performed by: INTERNAL MEDICINE

## 2025-04-23 PROCEDURE — 3078F DIAST BP <80 MM HG: CPT | Performed by: INTERNAL MEDICINE

## 2025-04-23 PROCEDURE — 1036F TOBACCO NON-USER: CPT | Performed by: INTERNAL MEDICINE

## 2025-04-23 PROCEDURE — 3074F SYST BP LT 130 MM HG: CPT | Performed by: INTERNAL MEDICINE

## 2025-04-23 PROCEDURE — G8417 CALC BMI ABV UP PARAM F/U: HCPCS | Performed by: INTERNAL MEDICINE

## 2025-04-23 PROCEDURE — 99214 OFFICE O/P EST MOD 30 MIN: CPT | Performed by: INTERNAL MEDICINE

## 2025-04-23 PROCEDURE — 3017F COLORECTAL CA SCREEN DOC REV: CPT | Performed by: INTERNAL MEDICINE

## 2025-04-23 RX ORDER — FLUTICASONE PROPIONATE AND SALMETEROL 250; 50 UG/1; UG/1
1 POWDER RESPIRATORY (INHALATION) 2 TIMES DAILY
Qty: 3 EACH | Refills: 3 | Status: SHIPPED | OUTPATIENT
Start: 2025-04-23

## 2025-04-23 RX ORDER — ALBUTEROL SULFATE 0.83 MG/ML
2.5 SOLUTION RESPIRATORY (INHALATION) EVERY 6 HOURS PRN
Qty: 120 EACH | Refills: 1 | Status: SHIPPED | OUTPATIENT
Start: 2025-04-23

## 2025-04-23 NOTE — PROGRESS NOTES
Aquaphor 325 mL 5    SM SENNA-S 8.6-50 MG per tablet TAKE 2 TABLETS BY MOUTH TWO TIMES A DAY (Patient taking differently: Take 2 tablets by mouth 2 times daily) 360 tablet 5    Continuous Blood Gluc Sensor (FREESTYLE EMERALD 2 SENSOR) MISC Change every 14 days 2 each 5    Blood Glucose Monitoring Suppl (ONETOUCH VERIO) w/Device KIT Use to check glucose 4 times daily. 1 kit 0    Compression Stockings MISC by Does not apply route Zippered stockings for daily use on lower extremities (do not wear at night). 20-30mmHg. 1 each 1    Continuous Blood Gluc  (FREESTYLE EMERALD 2 READER) MINDY To check glucose levels 1 each 0    albuterol (PROVENTIL) (2.5 MG/3ML) 0.083% nebulizer solution Take 3 mLs by nebulization every 6 hours as needed for Wheezing or Shortness of Breath 120 each 1    brexpiprazole (REXULTI) 4 MG TABS tablet Take 1 tablet by mouth at bedtime      loratadine (CLARITIN) 10 MG tablet TAKE 1 TABLET BY MOUTH ONE TIME A DAY  (Patient taking differently: Take 1 tablet by mouth daily) 30 tablet 5    OXYGEN Inhale 2 L into the lungs continuous Sometimes with activity      oxyCODONE (OXYCONTIN) 20 MG extended release tablet Take 1 tablet by mouth in the morning and 1 tablet in the evening.      aspirin 81 MG EC tablet Take 1 tablet by mouth daily      benztropine (COGENTIN) 2 MG tablet Take 1 tablet by mouth nightly      duloxetine (CYMBALTA) 60 MG capsule Take 1 capsule by mouth 2 times daily       No current facility-administered medications on file prior to visit.               ALLERGIES:   Allergies as of 04/23/2025 - Fully Reviewed 04/23/2025   Allergen Reaction Noted    Iodides Anaphylaxis 04/20/2011    Other Anaphylaxis and Other (See Comments) 07/22/2021    Trazodone Shortness Of Breath and Other (See Comments) 07/22/2021      OBJECTIVE:   height is 1.626 m (5' 4\") and weight is 94.6 kg (208 lb 9.6 oz). Her blood pressure is 122/76 and her pulse is 61. Her oxygen saturation is 97%.       PHYSICAL  [No Acute Distress] : no acute distress [Well Nourished] : well nourished [Well Developed] : well developed [Well-Appearing] : well-appearing [Normal Sclera/Conjunctiva] : normal sclera/conjunctiva [PERRL] : pupils equal round and reactive to light [EOMI] : extraocular movements intact [Normal Outer Ear/Nose] : the outer ears and nose were normal in appearance [Normal Oropharynx] : the oropharynx was normal [No JVD] : no jugular venous distention [No Lymphadenopathy] : no lymphadenopathy [Supple] : supple [Thyroid Normal, No Nodules] : the thyroid was normal and there were no nodules present [No Accessory Muscle Use] : no accessory muscle use [Clear to Auscultation] : lungs were clear to auscultation bilaterally [Normal Rate] : normal rate  [Regular Rhythm] : with a regular rhythm [Normal S1, S2] : normal S1 and S2 [Soft] : abdomen soft [Non Tender] : non-tender [Non-distended] : non-distended [No Masses] : no abdominal mass palpated [No HSM] : no HSM [No Joint Swelling] : no joint swelling [Grossly Normal Strength/Tone] : grossly normal strength/tone [No Rash] : no rash [No Focal Deficits] : no focal deficits [Normal Gait] : normal gait [Normal Affect] : the affect was normal [Alert and Oriented x3] : oriented to person, place, and time [Normal Insight/Judgement] : insight and judgment were intact [de-identified] : V [de-identified] : crescendo-decresendo murmur [de-identified] : Tenderness L Trapezius

## 2025-04-24 ENCOUNTER — RESULTS FOLLOW-UP (OUTPATIENT)
Dept: CARDIOLOGY CLINIC | Age: 55
End: 2025-04-24

## 2025-04-24 ENCOUNTER — HOSPITAL ENCOUNTER (OUTPATIENT)
Dept: ONCOLOGY | Age: 55
Setting detail: INFUSION SERIES
Discharge: HOME | End: 2025-04-24
Payer: COMMERCIAL

## 2025-04-24 VITALS
BODY MASS INDEX: 35.74 KG/M2 | WEIGHT: 208.2 LBS | TEMPERATURE: 98 F | DIASTOLIC BLOOD PRESSURE: 68 MMHG | HEART RATE: 64 BPM | OXYGEN SATURATION: 98 % | RESPIRATION RATE: 16 BRPM | SYSTOLIC BLOOD PRESSURE: 130 MMHG

## 2025-04-24 DIAGNOSIS — I50.32 CHRONIC DIASTOLIC CONGESTIVE HEART FAILURE (HCC): Primary | ICD-10-CM

## 2025-04-24 DIAGNOSIS — I50.32 CHRONIC HEART FAILURE WITH PRESERVED EJECTION FRACTION (HCC): ICD-10-CM

## 2025-04-24 DIAGNOSIS — R06.02 SHORTNESS OF BREATH: ICD-10-CM

## 2025-04-24 LAB
ANION GAP SERPL CALCULATED.3IONS-SCNC: 10 MMOL/L (ref 3–16)
BUN SERPL-MCNC: 42 MG/DL (ref 7–20)
CALCIUM SERPL-MCNC: 9 MG/DL (ref 8.3–10.6)
CHLORIDE SERPL-SCNC: 98 MMOL/L (ref 99–110)
CO2 SERPL-SCNC: 35 MMOL/L (ref 21–32)
CREAT SERPL-MCNC: 1.4 MG/DL (ref 0.6–1.1)
GFR SERPLBLD CREATININE-BSD FMLA CKD-EPI: 44 ML/MIN/{1.73_M2}
GLUCOSE SERPL-MCNC: 99 MG/DL (ref 70–99)
NT-PROBNP SERPL-MCNC: 871 PG/ML (ref 0–124)
POTASSIUM SERPL-SCNC: 4.4 MMOL/L (ref 3.5–5.1)
SODIUM SERPL-SCNC: 143 MMOL/L (ref 136–145)

## 2025-04-24 PROCEDURE — 96366 THER/PROPH/DIAG IV INF ADDON: CPT

## 2025-04-24 PROCEDURE — 96365 THER/PROPH/DIAG IV INF INIT: CPT

## 2025-04-24 PROCEDURE — 2580000003 HC RX 258: Performed by: INTERNAL MEDICINE

## 2025-04-24 PROCEDURE — 83880 ASSAY OF NATRIURETIC PEPTIDE: CPT

## 2025-04-24 PROCEDURE — 99211 OFF/OP EST MAY X REQ PHY/QHP: CPT

## 2025-04-24 PROCEDURE — 80048 BASIC METABOLIC PNL TOTAL CA: CPT

## 2025-04-24 PROCEDURE — 6360000002 HC RX W HCPCS: Performed by: INTERNAL MEDICINE

## 2025-04-24 PROCEDURE — 96375 TX/PRO/DX INJ NEW DRUG ADDON: CPT

## 2025-04-24 PROCEDURE — 2500000003 HC RX 250 WO HCPCS: Performed by: INTERNAL MEDICINE

## 2025-04-24 RX ORDER — FUROSEMIDE 10 MG/ML
120 INJECTION INTRAMUSCULAR; INTRAVENOUS ONCE
Status: CANCELLED
Start: 2025-05-01 | End: 2025-05-01

## 2025-04-24 RX ORDER — FUROSEMIDE 10 MG/ML
120 INJECTION INTRAMUSCULAR; INTRAVENOUS ONCE
Status: COMPLETED | OUTPATIENT
Start: 2025-04-24 | End: 2025-04-24

## 2025-04-24 RX ORDER — FOLIC ACID 1 MG/1
1000 TABLET ORAL DAILY
Qty: 90 TABLET | Refills: 1 | Status: SHIPPED | OUTPATIENT
Start: 2025-04-24

## 2025-04-24 RX ORDER — SODIUM CHLORIDE 0.9 % (FLUSH) 0.9 %
5-40 SYRINGE (ML) INJECTION ONCE
Status: COMPLETED | OUTPATIENT
Start: 2025-04-24 | End: 2025-04-24

## 2025-04-24 RX ORDER — SODIUM CHLORIDE 0.9 % (FLUSH) 0.9 %
5-40 SYRINGE (ML) INJECTION ONCE
Status: CANCELLED
Start: 2025-05-01 | End: 2025-05-01

## 2025-04-24 RX ADMIN — FUROSEMIDE 120 MG: 10 INJECTION, SOLUTION INTRAMUSCULAR; INTRAVENOUS at 10:41

## 2025-04-24 RX ADMIN — FUROSEMIDE 20 MG/HR: 10 INJECTION, SOLUTION INTRAMUSCULAR; INTRAVENOUS at 10:47

## 2025-04-24 RX ADMIN — Medication 10 ML: at 09:39

## 2025-04-24 NOTE — PROGRESS NOTES
Labs had been reviewed with Fabi ALLEN prior to administration  mg Lasix given over 6 minutes, followed by Lasix infusion 20 mg/hr over 4 hours completed, see MAR. Pt tolerated well, voided in restroom x2. . Pt discharged per wheel chair to car accompanied by .  To return next week for weekly IV labs and lasix.  Patients blood sugar now reading 77 on home monitor.  Denies shakiness, reports feeling better. Has drank 4 8 oz orange juices and hanna crackers.

## 2025-04-24 NOTE — PROGRESS NOTES
Patient returned from bathroom and stated that her blood sugar is 69 monitored per pt's blood sugar monitor disc per BRIDGETTE. Pt expressed that she feels a little shaky. 240 cc orange juice given along x2 gram cracker with peanut butter.

## 2025-04-30 DIAGNOSIS — E55.9 VITAMIN D DEFICIENCY: ICD-10-CM

## 2025-04-30 DIAGNOSIS — Z79.4 TYPE 2 DIABETES MELLITUS WITH OTHER CIRCULATORY COMPLICATION, WITH LONG-TERM CURRENT USE OF INSULIN (HCC): ICD-10-CM

## 2025-04-30 DIAGNOSIS — E03.2 HYPOTHYROIDISM DUE TO MEDICATION: ICD-10-CM

## 2025-04-30 DIAGNOSIS — E11.59 TYPE 2 DIABETES MELLITUS WITH OTHER CIRCULATORY COMPLICATION, WITH LONG-TERM CURRENT USE OF INSULIN (HCC): ICD-10-CM

## 2025-05-01 ENCOUNTER — HOSPITAL ENCOUNTER (OUTPATIENT)
Dept: ONCOLOGY | Age: 55
Setting detail: INFUSION SERIES
Discharge: HOME | End: 2025-05-01
Payer: COMMERCIAL

## 2025-05-01 ENCOUNTER — RESULTS FOLLOW-UP (OUTPATIENT)
Dept: CARDIOLOGY CLINIC | Age: 55
End: 2025-05-01

## 2025-05-01 VITALS
DIASTOLIC BLOOD PRESSURE: 63 MMHG | SYSTOLIC BLOOD PRESSURE: 121 MMHG | RESPIRATION RATE: 16 BRPM | TEMPERATURE: 97 F | HEART RATE: 67 BPM

## 2025-05-01 DIAGNOSIS — R06.02 SHORTNESS OF BREATH: ICD-10-CM

## 2025-05-01 DIAGNOSIS — I50.32 CHRONIC HEART FAILURE WITH PRESERVED EJECTION FRACTION (HCC): ICD-10-CM

## 2025-05-01 DIAGNOSIS — I50.32 CHRONIC DIASTOLIC CONGESTIVE HEART FAILURE (HCC): Primary | ICD-10-CM

## 2025-05-01 LAB
ANION GAP SERPL CALCULATED.3IONS-SCNC: 11 MMOL/L (ref 3–16)
BUN SERPL-MCNC: 40 MG/DL (ref 7–20)
CALCIUM SERPL-MCNC: 8.8 MG/DL (ref 8.3–10.6)
CHLORIDE SERPL-SCNC: 99 MMOL/L (ref 99–110)
CO2 SERPL-SCNC: 28 MMOL/L (ref 21–32)
CREAT SERPL-MCNC: 1.3 MG/DL (ref 0.6–1.1)
GFR SERPLBLD CREATININE-BSD FMLA CKD-EPI: 48 ML/MIN/{1.73_M2}
GLUCOSE SERPL-MCNC: 227 MG/DL (ref 70–99)
NT-PROBNP SERPL-MCNC: 259 PG/ML (ref 0–124)
POTASSIUM SERPL-SCNC: 4.4 MMOL/L (ref 3.5–5.1)
SODIUM SERPL-SCNC: 138 MMOL/L (ref 136–145)

## 2025-05-01 PROCEDURE — 6360000002 HC RX W HCPCS: Performed by: INTERNAL MEDICINE

## 2025-05-01 PROCEDURE — 96375 TX/PRO/DX INJ NEW DRUG ADDON: CPT

## 2025-05-01 PROCEDURE — 96365 THER/PROPH/DIAG IV INF INIT: CPT

## 2025-05-01 PROCEDURE — 83880 ASSAY OF NATRIURETIC PEPTIDE: CPT

## 2025-05-01 PROCEDURE — 2500000003 HC RX 250 WO HCPCS: Performed by: INTERNAL MEDICINE

## 2025-05-01 PROCEDURE — 99211 OFF/OP EST MAY X REQ PHY/QHP: CPT

## 2025-05-01 PROCEDURE — 2580000003 HC RX 258: Performed by: INTERNAL MEDICINE

## 2025-05-01 PROCEDURE — 96366 THER/PROPH/DIAG IV INF ADDON: CPT

## 2025-05-01 PROCEDURE — 80048 BASIC METABOLIC PNL TOTAL CA: CPT

## 2025-05-01 RX ORDER — FUROSEMIDE 10 MG/ML
120 INJECTION INTRAMUSCULAR; INTRAVENOUS ONCE
Start: 2025-05-08 | End: 2025-05-08

## 2025-05-01 RX ORDER — SODIUM CHLORIDE 0.9 % (FLUSH) 0.9 %
5-40 SYRINGE (ML) INJECTION ONCE
Status: COMPLETED | OUTPATIENT
Start: 2025-05-01 | End: 2025-05-01

## 2025-05-01 RX ORDER — CHOLECALCIFEROL (VITAMIN D3) 25 MCG
3 TABLET ORAL DAILY
Qty: 270 TABLET | Refills: 1 | Status: SHIPPED | OUTPATIENT
Start: 2025-05-01

## 2025-05-01 RX ORDER — FUROSEMIDE 10 MG/ML
120 INJECTION INTRAMUSCULAR; INTRAVENOUS ONCE
Status: COMPLETED | OUTPATIENT
Start: 2025-05-01 | End: 2025-05-01

## 2025-05-01 RX ORDER — SODIUM CHLORIDE 0.9 % (FLUSH) 0.9 %
5-40 SYRINGE (ML) INJECTION ONCE
Start: 2025-05-08 | End: 2025-05-08

## 2025-05-01 RX ADMIN — FUROSEMIDE 120 MG: 10 INJECTION, SOLUTION INTRAMUSCULAR; INTRAVENOUS at 10:14

## 2025-05-01 RX ADMIN — FUROSEMIDE 20 MG/HR: 10 INJECTION, SOLUTION INTRAMUSCULAR; INTRAVENOUS at 10:27

## 2025-05-01 RX ADMIN — Medication 10 ML: at 10:28

## 2025-05-01 NOTE — TELEPHONE ENCOUNTER
Medication:   Requested Prescriptions     Pending Prescriptions Disp Refills    VITAMIN D3 25 MCG TABS tablet [Pharmacy Med Name: Vitamin D3 Oral Tablet 25 MCG] 270 tablet 0     Sig: TAKE 3 TABLETS BY MOUTH EVERY DAY       Last Filled:      Patient Phone Number: [phone] (home)     Last appt: 4/22/2025   Next appt: 8/27/2025    Last Labs DM:   Lab Results   Component Value Date/Time    VITD25 51.5 08/22/2022 10:30 AM    VITD25 47.8 06/30/2022 01:06 PM

## 2025-05-01 NOTE — PROGRESS NOTES
Labs had been reviewed with Fabi ALLEN prior to administration  mg Lasix given over 6 minutes, followed by Lasix infusion 20 mg/hr over 4 hours completed, see MAR. Pt tolerated well, voided in restroom x 3. To return next week for weekly labs and IV lasix.  Patients blood sugar earlier 69, now reading 89 on home monitor, after drinking x4 Orange juices and eating gram crackers with peanut butter and few pieces of M&M's.  Denies shakiness, reports feeling better. Discharged to car per wheel chair accompanied by .

## 2025-05-01 NOTE — PATIENT INSTRUCTIONS
[facility]  [address]   [place], [place] [address]  Telephone: [phone]     FAX [phone]    Discharge Instructions    Important reminders:    **If you have any signs and symptoms of illness (Cough, fever, congestion, nausea, vomiting, diarrhea, etc.) please call the wound care center prior to your appointment.    1. Increase Protein intake for optimal wound healing  2. No added salt to reduce any swelling  3. If diabetic, maintain good glucose control  4. If you smoke, smoking prohibits wound healing, we ask that you refrain from smoking.  5. When taking antibiotics take the entire prescription as ordered. Do not stop taking until medication is all gone unless otherwise instructed.   6. Exercise as tolerated.   7. Keep weight off wounds and reposition every 2 hours if applicable.  8. If wound(s) is on your lower extremity, elevate legs to the level of the heart or above for 30 minutes 4-5 times a day and/or when sitting. Avoid standing for long periods of time.   9. Do not get wounds wet in bath or shower unless otherwise instructed by your physician. If your wound is on your foot or leg, you may purchase a cast bag. Please ask at the pharmacy.      If Vascular testing is ordered, please call [phone] to schedule.    Vascular tests ordered by Wound Care Physicians may take up to 2 hours to complete. Please keep that in mind when scheduling.     If Vascular testing is scheduled, please bring supplies to replace your dressing after testing is done. The vascular department does not stock supplies.     Wound: Left heel     With each dressing change, rinse wounds with 0.9% Saline. (May use wound wash or soft contact solution. Both can be purchased at a local drug store). If unable to obtain saline, may use a gentle soap and water.    Dressing care: Mupirocin ointment twice a day, in wound care cover with gauze and tape at home cover with a clean sock or place a piece of saran wrap

## 2025-05-05 ENCOUNTER — HOSPITAL ENCOUNTER (OUTPATIENT)
Dept: WOUND CARE | Age: 55
Discharge: HOME | End: 2025-05-05
Payer: COMMERCIAL

## 2025-05-05 VITALS — DIASTOLIC BLOOD PRESSURE: 73 MMHG | SYSTOLIC BLOOD PRESSURE: 121 MMHG | RESPIRATION RATE: 16 BRPM | HEART RATE: 66 BPM

## 2025-05-05 DIAGNOSIS — L97.422 CHRONIC HEEL ULCER, LEFT, WITH FAT LAYER EXPOSED (HCC): Primary | ICD-10-CM

## 2025-05-05 PROCEDURE — 99213 OFFICE O/P EST LOW 20 MIN: CPT

## 2025-05-05 PROCEDURE — 11042 DBRDMT SUBQ TIS 1ST 20SQCM/<: CPT

## 2025-05-05 RX ORDER — CLOBETASOL PROPIONATE 0.5 MG/G
OINTMENT TOPICAL PRN
OUTPATIENT
Start: 2025-05-05

## 2025-05-05 RX ORDER — NEOMYCIN/BACITRACIN/POLYMYXINB 3.5-400-5K
OINTMENT (GRAM) TOPICAL PRN
OUTPATIENT
Start: 2025-05-05

## 2025-05-05 RX ORDER — SILVER SULFADIAZINE 10 MG/G
CREAM TOPICAL PRN
OUTPATIENT
Start: 2025-05-05

## 2025-05-05 RX ORDER — LIDOCAINE 40 MG/G
CREAM TOPICAL PRN
OUTPATIENT
Start: 2025-05-05

## 2025-05-05 RX ORDER — MUPIROCIN 20 MG/G
OINTMENT TOPICAL PRN
OUTPATIENT
Start: 2025-05-05

## 2025-05-05 RX ORDER — GINSENG 100 MG
CAPSULE ORAL PRN
OUTPATIENT
Start: 2025-05-05

## 2025-05-05 RX ORDER — SODIUM CHLOR/HYPOCHLOROUS ACID 0.033 %
SOLUTION, IRRIGATION IRRIGATION PRN
OUTPATIENT
Start: 2025-05-05

## 2025-05-05 RX ORDER — BETAMETHASONE DIPROPIONATE 0.5 MG/G
CREAM TOPICAL PRN
OUTPATIENT
Start: 2025-05-05

## 2025-05-05 RX ORDER — TRIAMCINOLONE ACETONIDE 1 MG/G
OINTMENT TOPICAL PRN
OUTPATIENT
Start: 2025-05-05

## 2025-05-05 RX ORDER — GENTAMICIN SULFATE 1 MG/G
OINTMENT TOPICAL PRN
OUTPATIENT
Start: 2025-05-05

## 2025-05-05 RX ORDER — BACITRACIN ZINC AND POLYMYXIN B SULFATE 500; 1000 [USP'U]/G; [USP'U]/G
OINTMENT TOPICAL PRN
OUTPATIENT
Start: 2025-05-05

## 2025-05-05 RX ORDER — LIDOCAINE HYDROCHLORIDE 20 MG/ML
JELLY TOPICAL PRN
OUTPATIENT
Start: 2025-05-05

## 2025-05-05 RX ORDER — LIDOCAINE 50 MG/G
OINTMENT TOPICAL PRN
OUTPATIENT
Start: 2025-05-05

## 2025-05-05 RX ORDER — MUPIROCIN 20 MG/G
OINTMENT TOPICAL
Qty: 30 G | Refills: 1 | Status: SHIPPED | OUTPATIENT
Start: 2025-05-05

## 2025-05-05 RX ORDER — LIDOCAINE HYDROCHLORIDE 40 MG/ML
SOLUTION TOPICAL PRN
OUTPATIENT
Start: 2025-05-05

## 2025-05-05 ASSESSMENT — PAIN DESCRIPTION - FREQUENCY: FREQUENCY: INTERMITTENT

## 2025-05-05 ASSESSMENT — PAIN SCALES - GENERAL: PAINLEVEL_OUTOF10: 6

## 2025-05-05 ASSESSMENT — PAIN DESCRIPTION - ORIENTATION: ORIENTATION: LEFT

## 2025-05-05 ASSESSMENT — PAIN - FUNCTIONAL ASSESSMENT: PAIN_FUNCTIONAL_ASSESSMENT: PREVENTS OR INTERFERES SOME ACTIVE ACTIVITIES AND ADLS

## 2025-05-05 ASSESSMENT — PAIN DESCRIPTION - ONSET: ONSET: ON-GOING

## 2025-05-05 ASSESSMENT — PAIN DESCRIPTION - DESCRIPTORS: DESCRIPTORS: OTHER (COMMENT);SHOOTING

## 2025-05-05 ASSESSMENT — PAIN DESCRIPTION - PAIN TYPE: TYPE: ACUTE PAIN

## 2025-05-05 ASSESSMENT — PAIN DESCRIPTION - LOCATION: LOCATION: FOOT

## 2025-05-05 NOTE — PLAN OF CARE
Discharge instructions given.  Patient verbalized understanding.  Return to [facility] in 2 week(s).  Called/faxed orders to [facility]

## 2025-05-05 NOTE — PROGRESS NOTES
rich plaque    Seronegative rheumatoid arthritis (HCC)    History of nephrolithiasis    Language barrier affecting health care    Morbidly obese (HCC)    H/O TIA (transient ischemic attack) and stroke    Complex care coordination    Constipation due to pain medication    Environmental and seasonal allergies    Chronic daily headache    Tension type headache    Severe episode of recurrent major depressive disorder (HCC)    Alopecia    Seborrhea capitis in adult    DDD (degenerative disc disease), cervical    DDD (degenerative disc disease), lumbar    OAB (overactive bladder)    Hypertriglyceridemia    Dry skin    Anemia    Stage 3a chronic kidney disease (HCC)    Memory problem    Chronic respiratory failure with hypoxia (HCC)    Dry mouth    Drowsiness    Moderate persistent asthma without complication       Procedure Note    Performed by: Wali Lenz DPM    Consent obtained: Yes    Time out taken:  Yes    Pain Control:       Debridement:Excisional Debridement    Using curette the wound was sharply debrided    down through and including the removal of epidermis, dermis, and subcutaneous tissue.        Devitalized Tissue Debrided:  fibrin, biofilm, slough, and callus    Pre Debridement Measurements:  Are located in the Wound Documentation Flow Sheet    Wound #: 1 and 2     Post  Debridement Measurements:  Wound 05/05/25 Heel Left #1 (Active)   Wound Image   05/05/25 1347   Wound Etiology Other 05/05/25 1347   Wound Cleansed Cleansed with saline 05/05/25 1347   Wound Length (cm) 1 cm 05/05/25 1347   Wound Width (cm) 0.2 cm 05/05/25 1347   Wound Depth (cm) 0.1 cm 05/05/25 1347   Wound Surface Area (cm^2) 0.2 cm^2 05/05/25 1347   Wound Volume (cm^3) 0.02 cm^3 05/05/25 1347   Wound Assessment Pink/red 05/05/25 1347   Drainage Amount Scant (moist but unmeasurable) 05/05/25 1347   Drainage Description Serosanguinous 05/05/25 1347   Odor None 05/05/25 1347   Aisha-wound Assessment Hyperkeratosis (callous) 05/05/25

## 2025-05-07 ENCOUNTER — RESULTS FOLLOW-UP (OUTPATIENT)
Dept: CARDIOLOGY CLINIC | Age: 55
End: 2025-05-07

## 2025-05-07 ENCOUNTER — HOSPITAL ENCOUNTER (OUTPATIENT)
Dept: ONCOLOGY | Age: 55
Setting detail: INFUSION SERIES
Discharge: HOME | End: 2025-05-07
Payer: COMMERCIAL

## 2025-05-07 VITALS
SYSTOLIC BLOOD PRESSURE: 145 MMHG | HEART RATE: 62 BPM | OXYGEN SATURATION: 98 % | TEMPERATURE: 96.1 F | BODY MASS INDEX: 34.5 KG/M2 | RESPIRATION RATE: 16 BRPM | DIASTOLIC BLOOD PRESSURE: 68 MMHG | WEIGHT: 201 LBS

## 2025-05-07 DIAGNOSIS — I50.32 CHRONIC HEART FAILURE WITH PRESERVED EJECTION FRACTION (HCC): ICD-10-CM

## 2025-05-07 DIAGNOSIS — R06.02 SHORTNESS OF BREATH: ICD-10-CM

## 2025-05-07 DIAGNOSIS — I50.32 CHRONIC DIASTOLIC CONGESTIVE HEART FAILURE (HCC): Primary | ICD-10-CM

## 2025-05-07 LAB
ANION GAP SERPL CALCULATED.3IONS-SCNC: 10 MMOL/L (ref 3–16)
BUN SERPL-MCNC: 47 MG/DL (ref 7–20)
CALCIUM SERPL-MCNC: 8.9 MG/DL (ref 8.3–10.6)
CHLORIDE SERPL-SCNC: 98 MMOL/L (ref 99–110)
CO2 SERPL-SCNC: 31 MMOL/L (ref 21–32)
CREAT SERPL-MCNC: 1.4 MG/DL (ref 0.6–1.1)
GFR SERPLBLD CREATININE-BSD FMLA CKD-EPI: 44 ML/MIN/{1.73_M2}
GLUCOSE SERPL-MCNC: 167 MG/DL (ref 70–99)
NT-PROBNP SERPL-MCNC: 198 PG/ML (ref 0–124)
POTASSIUM SERPL-SCNC: 4.4 MMOL/L (ref 3.5–5.1)
SODIUM SERPL-SCNC: 139 MMOL/L (ref 136–145)

## 2025-05-07 PROCEDURE — 96366 THER/PROPH/DIAG IV INF ADDON: CPT

## 2025-05-07 PROCEDURE — 80048 BASIC METABOLIC PNL TOTAL CA: CPT

## 2025-05-07 PROCEDURE — 2580000003 HC RX 258: Performed by: INTERNAL MEDICINE

## 2025-05-07 PROCEDURE — 99211 OFF/OP EST MAY X REQ PHY/QHP: CPT

## 2025-05-07 PROCEDURE — 2500000003 HC RX 250 WO HCPCS: Performed by: INTERNAL MEDICINE

## 2025-05-07 PROCEDURE — 83880 ASSAY OF NATRIURETIC PEPTIDE: CPT

## 2025-05-07 PROCEDURE — 96365 THER/PROPH/DIAG IV INF INIT: CPT

## 2025-05-07 PROCEDURE — 96375 TX/PRO/DX INJ NEW DRUG ADDON: CPT

## 2025-05-07 PROCEDURE — 6360000002 HC RX W HCPCS: Performed by: INTERNAL MEDICINE

## 2025-05-07 RX ORDER — SODIUM CHLORIDE 0.9 % (FLUSH) 0.9 %
5-40 SYRINGE (ML) INJECTION ONCE
Status: COMPLETED | OUTPATIENT
Start: 2025-05-07 | End: 2025-05-07

## 2025-05-07 RX ORDER — FUROSEMIDE 10 MG/ML
120 INJECTION INTRAMUSCULAR; INTRAVENOUS ONCE
Status: COMPLETED | OUTPATIENT
Start: 2025-05-07 | End: 2025-05-07

## 2025-05-07 RX ORDER — FUROSEMIDE 10 MG/ML
120 INJECTION INTRAMUSCULAR; INTRAVENOUS ONCE
Status: CANCELLED
Start: 2025-05-08 | End: 2025-05-08

## 2025-05-07 RX ORDER — SODIUM CHLORIDE 0.9 % (FLUSH) 0.9 %
5-40 SYRINGE (ML) INJECTION ONCE
Status: CANCELLED
Start: 2025-05-08 | End: 2025-05-08

## 2025-05-07 RX ADMIN — FUROSEMIDE 20 MG/HR: 10 INJECTION, SOLUTION INTRAMUSCULAR; INTRAVENOUS at 10:06

## 2025-05-07 RX ADMIN — FUROSEMIDE 120 MG: 10 INJECTION, SOLUTION INTRAMUSCULAR; INTRAVENOUS at 09:59

## 2025-05-07 RX ADMIN — Medication 10 ML: at 09:58

## 2025-05-07 NOTE — TELEPHONE ENCOUNTER
----- Message from Dr. Jessa Parsons MD sent at 5/7/2025  1:15 PM EDT -----  Labs are okay.  It is okay for her to go ahead and get her infusion tomorrow as scheduled.  ARETHA

## 2025-05-14 DIAGNOSIS — E78.5 HYPERLIPIDEMIA LDL GOAL <70: ICD-10-CM

## 2025-05-15 ENCOUNTER — RESULTS FOLLOW-UP (OUTPATIENT)
Dept: CARDIOLOGY CLINIC | Age: 55
End: 2025-05-15

## 2025-05-15 ENCOUNTER — HOSPITAL ENCOUNTER (OUTPATIENT)
Dept: ONCOLOGY | Age: 55
Setting detail: INFUSION SERIES
Discharge: HOME | End: 2025-05-15
Payer: COMMERCIAL

## 2025-05-15 VITALS
TEMPERATURE: 96.8 F | RESPIRATION RATE: 16 BRPM | DIASTOLIC BLOOD PRESSURE: 73 MMHG | SYSTOLIC BLOOD PRESSURE: 131 MMHG | HEART RATE: 70 BPM

## 2025-05-15 DIAGNOSIS — I50.32 CHRONIC HEART FAILURE WITH PRESERVED EJECTION FRACTION (HCC): ICD-10-CM

## 2025-05-15 DIAGNOSIS — R06.02 SHORTNESS OF BREATH: ICD-10-CM

## 2025-05-15 DIAGNOSIS — I50.32 CHRONIC DIASTOLIC CONGESTIVE HEART FAILURE (HCC): Primary | ICD-10-CM

## 2025-05-15 LAB
ANION GAP SERPL CALCULATED.3IONS-SCNC: 9 MMOL/L (ref 3–16)
BUN SERPL-MCNC: 28 MG/DL (ref 7–20)
CALCIUM SERPL-MCNC: 8.8 MG/DL (ref 8.3–10.6)
CHLORIDE SERPL-SCNC: 98 MMOL/L (ref 99–110)
CO2 SERPL-SCNC: 33 MMOL/L (ref 21–32)
CREAT SERPL-MCNC: 1.2 MG/DL (ref 0.6–1.1)
GFR SERPLBLD CREATININE-BSD FMLA CKD-EPI: 53 ML/MIN/{1.73_M2}
GLUCOSE SERPL-MCNC: 189 MG/DL (ref 70–99)
NT-PROBNP SERPL-MCNC: 215 PG/ML (ref 0–124)
POTASSIUM SERPL-SCNC: 4.3 MMOL/L (ref 3.5–5.1)
SODIUM SERPL-SCNC: 140 MMOL/L (ref 136–145)

## 2025-05-15 PROCEDURE — 80048 BASIC METABOLIC PNL TOTAL CA: CPT

## 2025-05-15 PROCEDURE — 96366 THER/PROPH/DIAG IV INF ADDON: CPT

## 2025-05-15 PROCEDURE — 2580000003 HC RX 258: Performed by: INTERNAL MEDICINE

## 2025-05-15 PROCEDURE — 96375 TX/PRO/DX INJ NEW DRUG ADDON: CPT

## 2025-05-15 PROCEDURE — 99211 OFF/OP EST MAY X REQ PHY/QHP: CPT

## 2025-05-15 PROCEDURE — 6360000002 HC RX W HCPCS: Performed by: INTERNAL MEDICINE

## 2025-05-15 PROCEDURE — 2500000003 HC RX 250 WO HCPCS: Performed by: INTERNAL MEDICINE

## 2025-05-15 PROCEDURE — 83880 ASSAY OF NATRIURETIC PEPTIDE: CPT

## 2025-05-15 PROCEDURE — 96365 THER/PROPH/DIAG IV INF INIT: CPT

## 2025-05-15 RX ORDER — ROSUVASTATIN CALCIUM 20 MG/1
20 TABLET, COATED ORAL NIGHTLY
Qty: 90 TABLET | Refills: 1 | Status: SHIPPED | OUTPATIENT
Start: 2025-05-15

## 2025-05-15 RX ORDER — SODIUM CHLORIDE 0.9 % (FLUSH) 0.9 %
5-40 SYRINGE (ML) INJECTION ONCE
Status: CANCELLED
Start: 2025-05-22 | End: 2025-05-22

## 2025-05-15 RX ORDER — SODIUM CHLORIDE 0.9 % (FLUSH) 0.9 %
5-40 SYRINGE (ML) INJECTION ONCE
Status: COMPLETED | OUTPATIENT
Start: 2025-05-15 | End: 2025-05-15

## 2025-05-15 RX ORDER — FUROSEMIDE 10 MG/ML
120 INJECTION INTRAMUSCULAR; INTRAVENOUS ONCE
Status: CANCELLED
Start: 2025-05-22 | End: 2025-05-22

## 2025-05-15 RX ORDER — FUROSEMIDE 10 MG/ML
120 INJECTION INTRAMUSCULAR; INTRAVENOUS ONCE
Status: COMPLETED | OUTPATIENT
Start: 2025-05-15 | End: 2025-05-15

## 2025-05-15 RX ADMIN — FUROSEMIDE 120 MG: 10 INJECTION, SOLUTION INTRAMUSCULAR; INTRAVENOUS at 09:51

## 2025-05-15 RX ADMIN — Medication 10 ML: at 14:00

## 2025-05-15 RX ADMIN — FUROSEMIDE 20 MG/HR: 10 INJECTION, SOLUTION INTRAMUSCULAR; INTRAVENOUS at 09:57

## 2025-05-15 NOTE — TELEPHONE ENCOUNTER
----- Message from Dr. Jessa Parsons MD sent at 5/15/2025  9:54 AM EDT -----  Please call the infusion center and let them know that labs are fine to go ahead with iv and infusion of lasix today.  ARETHA

## 2025-05-15 NOTE — PROGRESS NOTES
Labs had been reviewed prior to administration per Fabi NP verbal order parameters to hold if potassium under 3 or creatinine above 1.9.. mg Lasix given over 6 minutes, followed by starting Lasix infusion 20 mg/hr over to be given over 4 hours, see MAR. Pt tolerating well. No needs voiced at this time.

## 2025-05-15 NOTE — TELEPHONE ENCOUNTER
Received refill request for  rosuvastatin (CRESTOR) 20 MG  from [facility] pharmacy.     Last OV: 03/10/25    Next OV: 05/27/25    Last Labs: 05/07/25    Last Filled: 11/14/24

## 2025-05-15 NOTE — PROGRESS NOTES
Four hour Lasix infusion completed. Patient tolerated well. No adverse reaction noted. Reviewed AVS with possible side effects. Comprehension noted. Patient discharged to car with wheelchair accompanied by .

## 2025-05-21 DIAGNOSIS — J45.30 MILD PERSISTENT ASTHMA WITHOUT COMPLICATION: ICD-10-CM

## 2025-05-21 DIAGNOSIS — D50.9 IRON DEFICIENCY ANEMIA, UNSPECIFIED IRON DEFICIENCY ANEMIA TYPE: ICD-10-CM

## 2025-05-22 ENCOUNTER — HOSPITAL ENCOUNTER (OUTPATIENT)
Dept: ONCOLOGY | Age: 55
Setting detail: INFUSION SERIES
Discharge: HOME | End: 2025-05-22
Payer: COMMERCIAL

## 2025-05-22 ENCOUNTER — RESULTS FOLLOW-UP (OUTPATIENT)
Dept: CARDIOLOGY CLINIC | Age: 55
End: 2025-05-22

## 2025-05-22 VITALS
SYSTOLIC BLOOD PRESSURE: 124 MMHG | TEMPERATURE: 97 F | DIASTOLIC BLOOD PRESSURE: 64 MMHG | BODY MASS INDEX: 36.18 KG/M2 | HEART RATE: 67 BPM | WEIGHT: 210.8 LBS | RESPIRATION RATE: 16 BRPM

## 2025-05-22 DIAGNOSIS — R06.02 SHORTNESS OF BREATH: ICD-10-CM

## 2025-05-22 DIAGNOSIS — I50.32 CHRONIC DIASTOLIC CONGESTIVE HEART FAILURE (HCC): Primary | ICD-10-CM

## 2025-05-22 DIAGNOSIS — I50.32 CHRONIC HEART FAILURE WITH PRESERVED EJECTION FRACTION (HCC): ICD-10-CM

## 2025-05-22 LAB
ANION GAP SERPL CALCULATED.3IONS-SCNC: 11 MMOL/L (ref 3–16)
BUN SERPL-MCNC: 51 MG/DL (ref 7–20)
CALCIUM SERPL-MCNC: 9.2 MG/DL (ref 8.3–10.6)
CHLORIDE SERPL-SCNC: 99 MMOL/L (ref 99–110)
CO2 SERPL-SCNC: 33 MMOL/L (ref 21–32)
CREAT SERPL-MCNC: 1.3 MG/DL (ref 0.6–1.1)
GFR SERPLBLD CREATININE-BSD FMLA CKD-EPI: 48 ML/MIN/{1.73_M2}
GLUCOSE BLD-MCNC: 79 MG/DL (ref 70–99)
GLUCOSE SERPL-MCNC: 42 MG/DL (ref 70–99)
NT-PROBNP SERPL-MCNC: 367 PG/ML (ref 0–124)
PERFORMED ON: NORMAL
POTASSIUM SERPL-SCNC: 4.1 MMOL/L (ref 3.5–5.1)
SODIUM SERPL-SCNC: 143 MMOL/L (ref 136–145)

## 2025-05-22 PROCEDURE — 96375 TX/PRO/DX INJ NEW DRUG ADDON: CPT

## 2025-05-22 PROCEDURE — 2500000003 HC RX 250 WO HCPCS: Performed by: INTERNAL MEDICINE

## 2025-05-22 PROCEDURE — 83880 ASSAY OF NATRIURETIC PEPTIDE: CPT

## 2025-05-22 PROCEDURE — 96365 THER/PROPH/DIAG IV INF INIT: CPT

## 2025-05-22 PROCEDURE — 6360000002 HC RX W HCPCS: Performed by: INTERNAL MEDICINE

## 2025-05-22 PROCEDURE — 99211 OFF/OP EST MAY X REQ PHY/QHP: CPT

## 2025-05-22 PROCEDURE — 2580000003 HC RX 258: Performed by: INTERNAL MEDICINE

## 2025-05-22 PROCEDURE — 80048 BASIC METABOLIC PNL TOTAL CA: CPT

## 2025-05-22 PROCEDURE — 96366 THER/PROPH/DIAG IV INF ADDON: CPT

## 2025-05-22 RX ORDER — FUROSEMIDE 10 MG/ML
120 INJECTION INTRAMUSCULAR; INTRAVENOUS ONCE
Status: CANCELLED
Start: 2025-05-29 | End: 2025-05-29

## 2025-05-22 RX ORDER — SODIUM CHLORIDE 0.9 % (FLUSH) 0.9 %
5-40 SYRINGE (ML) INJECTION ONCE
Status: COMPLETED | OUTPATIENT
Start: 2025-05-22 | End: 2025-05-22

## 2025-05-22 RX ORDER — POTASSIUM CHLORIDE 1500 MG/1
20 TABLET, EXTENDED RELEASE ORAL DAILY
Qty: 90 TABLET | Refills: 3 | Status: SHIPPED | OUTPATIENT
Start: 2025-05-22

## 2025-05-22 RX ORDER — FERROUS SULFATE 325(65) MG
1 TABLET ORAL 2 TIMES DAILY WITH MEALS
Qty: 180 TABLET | Refills: 1 | Status: SHIPPED | OUTPATIENT
Start: 2025-05-22

## 2025-05-22 RX ORDER — SODIUM CHLORIDE 0.9 % (FLUSH) 0.9 %
5-40 SYRINGE (ML) INJECTION ONCE
Status: CANCELLED
Start: 2025-05-29 | End: 2025-05-29

## 2025-05-22 RX ORDER — FUROSEMIDE 10 MG/ML
120 INJECTION INTRAMUSCULAR; INTRAVENOUS ONCE
Status: COMPLETED | OUTPATIENT
Start: 2025-05-22 | End: 2025-05-22

## 2025-05-22 RX ORDER — MONTELUKAST SODIUM 10 MG/1
10 TABLET ORAL
Qty: 30 TABLET | Refills: 5 | Status: SHIPPED | OUTPATIENT
Start: 2025-05-22

## 2025-05-22 RX ADMIN — FUROSEMIDE 20 MG/HR: 10 INJECTION, SOLUTION INTRAMUSCULAR; INTRAVENOUS at 10:05

## 2025-05-22 RX ADMIN — FUROSEMIDE 120 MG: 10 INJECTION, SOLUTION INTRAMUSCULAR; INTRAVENOUS at 09:59

## 2025-05-22 RX ADMIN — Medication 10 ML: at 10:00

## 2025-05-22 NOTE — PROGRESS NOTES
On arrival to infusion center. Pt's Blood sugar off of patient's monitor noted to be 53. Orange juice, hanna crackers and peanut butter given at this time

## 2025-05-22 NOTE — PROGRESS NOTES
Lab called with blood sugar of 42, coincides with blood sugar when patient arrived to dept. Pt's blood sugar rechecked with glucometer at this time. Blood sugar 79. Will cont to monitor

## 2025-05-22 NOTE — TELEPHONE ENCOUNTER
Last OV: 3/10/2025  Next OV: 5/27/2025    Next appointment due:5/10/2025    Last fill:2/19/2025  Refills:0    OK to fill

## 2025-05-27 ENCOUNTER — OFFICE VISIT (OUTPATIENT)
Dept: INTERNAL MEDICINE CLINIC | Age: 55
End: 2025-05-27
Payer: COMMERCIAL

## 2025-05-27 VITALS
OXYGEN SATURATION: 96 % | SYSTOLIC BLOOD PRESSURE: 108 MMHG | HEART RATE: 74 BPM | BODY MASS INDEX: 36.73 KG/M2 | WEIGHT: 214 LBS | DIASTOLIC BLOOD PRESSURE: 60 MMHG

## 2025-05-27 DIAGNOSIS — R42 VERTIGO: Primary | ICD-10-CM

## 2025-05-27 DIAGNOSIS — L85.3 DRY SKIN: ICD-10-CM

## 2025-05-27 DIAGNOSIS — I50.32 CHRONIC DIASTOLIC CONGESTIVE HEART FAILURE (HCC): ICD-10-CM

## 2025-05-27 DIAGNOSIS — I10 ESSENTIAL HYPERTENSION: Chronic | ICD-10-CM

## 2025-05-27 DIAGNOSIS — R07.81 RIB PAIN ON LEFT SIDE: ICD-10-CM

## 2025-05-27 DIAGNOSIS — S20.212A CONTUSION OF RIB ON LEFT SIDE, INITIAL ENCOUNTER: ICD-10-CM

## 2025-05-27 DIAGNOSIS — B35.6 TINEA CRURIS: ICD-10-CM

## 2025-05-27 PROCEDURE — 3078F DIAST BP <80 MM HG: CPT | Performed by: INTERNAL MEDICINE

## 2025-05-27 PROCEDURE — 99214 OFFICE O/P EST MOD 30 MIN: CPT | Performed by: INTERNAL MEDICINE

## 2025-05-27 PROCEDURE — G8417 CALC BMI ABV UP PARAM F/U: HCPCS | Performed by: INTERNAL MEDICINE

## 2025-05-27 PROCEDURE — 1036F TOBACCO NON-USER: CPT | Performed by: INTERNAL MEDICINE

## 2025-05-27 PROCEDURE — 3074F SYST BP LT 130 MM HG: CPT | Performed by: INTERNAL MEDICINE

## 2025-05-27 PROCEDURE — 3017F COLORECTAL CA SCREEN DOC REV: CPT | Performed by: INTERNAL MEDICINE

## 2025-05-27 PROCEDURE — G8427 DOCREV CUR MEDS BY ELIG CLIN: HCPCS | Performed by: INTERNAL MEDICINE

## 2025-05-27 RX ORDER — NYSTATIN 100000 U/G
CREAM TOPICAL
Qty: 30 G | Refills: 1 | Status: SHIPPED | OUTPATIENT
Start: 2025-05-27

## 2025-05-27 RX ORDER — UREA 40 %
CREAM (GRAM) TOPICAL
Qty: 227 G | Refills: 1 | Status: SHIPPED | OUTPATIENT
Start: 2025-05-27

## 2025-05-27 RX ORDER — MUPIROCIN 20 MG/G
OINTMENT TOPICAL
Qty: 30 G | Refills: 1 | Status: SHIPPED | OUTPATIENT
Start: 2025-05-27

## 2025-05-27 RX ORDER — LIDOCAINE HCL 4% 4 G/100G
CREAM TOPICAL
Qty: 133 G | Refills: 1 | Status: SHIPPED | OUTPATIENT
Start: 2025-05-27

## 2025-05-27 RX ORDER — LIDOCAINE 50 MG/G
1 PATCH TOPICAL DAILY
Qty: 30 PATCH | Refills: 2 | Status: SHIPPED | OUTPATIENT
Start: 2025-05-27

## 2025-05-27 NOTE — ASSESSMENT & PLAN NOTE
Weight is up.  She is scheduled to see Dr. Trevizo tomorrow.  She does continue to get weekly Lasix infusions.    She remains on Zaroxolyn 5 mg twice weekly, metoprolol 25 mg twice daily, spironolactone 50 mg daily, and Demadex 100 mg a.m./50 mg p.m.

## 2025-05-27 NOTE — ASSESSMENT & PLAN NOTE
Discussed with patient and her  that rib fracture versus rib contusion has no difference in management.  Discussed that both contusions and fractures of the ribs will take 6 to 8 weeks to fully heal.  She is not having any shortness of breath and there is no subcutaneous emphysema on exam.  Recommend supportive care with heat as needed.  Add Lidoderm patch on 12 hours/off 12 hours.  Discussed importance of taking deep breaths and splinting ribs with a pillow while taking a deep breath and coughing to help with discomfort.

## 2025-05-27 NOTE — PROGRESS NOTES
Patient: Crow Bonner is a 55 y.o. female who presents today with the following Chief Complaint(s):  Chief Complaint   Patient presents with    Follow-up     2 month    Fall     Patient stated she fell x 3 days ago, complains of rib pain left side    Wound Check     Left heal       HPI    Here today for follow up. Accompanied by her  who helps with her history.    Has been getting lightheaded \"a lot\".  - world spinning  - feels like her eyes are \"jumping\".   - worse with turning to the left.  - sx x 1-2 weeks.   - would prefer home PT if able. Uses [facility].  We did check with  and they do not do home vestibular therapy.     Had a fall at home d/t vertigo- fell and broke a table and hurt her left ribs.   - hurts to breathe d/t rib pain.  - fall was 3 days ago.   - has been using ice.     Has a wound on her left heel. Has been helped by urea cream. Needs a refill.     Is worried about weight gain.  She does have an appointment with Dr. Parsons tomorrow.    Allergies   Allergen Reactions    Iodides Anaphylaxis     allergic to ct scan dye, not the MRI    Other/Food Anaphylaxis and Other (See Comments)    Trazodone Shortness Of Breath and Other (See Comments)     Fainted, asthma attack, felt sluggish      Past Medical History:   Diagnosis Date    Acute CVA (cerebrovascular accident) ([facility]) 03/09/2023    Anxiety     Anxiety and depression     Arthritis     not sure of specific type    Asthma     CAD (coronary artery disease)     Cancer ([facility]) 2007    left breast    Cerebral artery occlusion with cerebral infarction ([facility])     2000, 2001    CHF (congestive heart failure) ([facility]) 2018    Chronic kidney disease     renal insufficency/to see Dr Romario Saeed    Chronic pain     Constipation     COPD (chronic obstructive pulmonary disease) ([facility])     Depression     Diabetes mellitus ([facility])     Diabetic polyneuropathy associated with type 2 diabetes mellitus ([facility]) 02/02/2018    Dysthymia 02/02/2018

## 2025-05-27 NOTE — ASSESSMENT & PLAN NOTE
Refer to physical therapy for vestibular therapy.  Patient had a significant fall with injury 3 days ago related to vertigo.

## 2025-05-29 ENCOUNTER — RESULTS FOLLOW-UP (OUTPATIENT)
Dept: CARDIOLOGY CLINIC | Age: 55
End: 2025-05-29

## 2025-05-29 ENCOUNTER — HOSPITAL ENCOUNTER (OUTPATIENT)
Dept: ONCOLOGY | Age: 55
Setting detail: INFUSION SERIES
Discharge: HOME | End: 2025-05-29
Payer: COMMERCIAL

## 2025-05-29 VITALS
TEMPERATURE: 97.3 F | OXYGEN SATURATION: 98 % | RESPIRATION RATE: 16 BRPM | WEIGHT: 212.02 LBS | SYSTOLIC BLOOD PRESSURE: 127 MMHG | DIASTOLIC BLOOD PRESSURE: 68 MMHG | HEART RATE: 71 BPM | BODY MASS INDEX: 36.39 KG/M2

## 2025-05-29 DIAGNOSIS — I50.32 CHRONIC DIASTOLIC CONGESTIVE HEART FAILURE (HCC): Primary | ICD-10-CM

## 2025-05-29 DIAGNOSIS — I50.32 CHRONIC HEART FAILURE WITH PRESERVED EJECTION FRACTION (HCC): ICD-10-CM

## 2025-05-29 DIAGNOSIS — R06.02 SHORTNESS OF BREATH: ICD-10-CM

## 2025-05-29 LAB
ANION GAP SERPL CALCULATED.3IONS-SCNC: 10 MMOL/L (ref 3–16)
BUN SERPL-MCNC: 36 MG/DL (ref 7–20)
CALCIUM SERPL-MCNC: 9.4 MG/DL (ref 8.3–10.6)
CHLORIDE SERPL-SCNC: 97 MMOL/L (ref 99–110)
CO2 SERPL-SCNC: 33 MMOL/L (ref 21–32)
CREAT SERPL-MCNC: 1.2 MG/DL (ref 0.6–1.1)
GFR SERPLBLD CREATININE-BSD FMLA CKD-EPI: 53 ML/MIN/{1.73_M2}
GLUCOSE SERPL-MCNC: 182 MG/DL (ref 70–99)
NT-PROBNP SERPL-MCNC: 260 PG/ML (ref 0–124)
POTASSIUM SERPL-SCNC: 4.2 MMOL/L (ref 3.5–5.1)
SODIUM SERPL-SCNC: 140 MMOL/L (ref 136–145)

## 2025-05-29 PROCEDURE — 2580000003 HC RX 258: Performed by: INTERNAL MEDICINE

## 2025-05-29 PROCEDURE — 96366 THER/PROPH/DIAG IV INF ADDON: CPT

## 2025-05-29 PROCEDURE — 96374 THER/PROPH/DIAG INJ IV PUSH: CPT

## 2025-05-29 PROCEDURE — 96365 THER/PROPH/DIAG IV INF INIT: CPT

## 2025-05-29 PROCEDURE — 2500000003 HC RX 250 WO HCPCS: Performed by: INTERNAL MEDICINE

## 2025-05-29 PROCEDURE — 80048 BASIC METABOLIC PNL TOTAL CA: CPT

## 2025-05-29 PROCEDURE — 99211 OFF/OP EST MAY X REQ PHY/QHP: CPT

## 2025-05-29 PROCEDURE — 83880 ASSAY OF NATRIURETIC PEPTIDE: CPT

## 2025-05-29 PROCEDURE — 6360000002 HC RX W HCPCS: Performed by: INTERNAL MEDICINE

## 2025-05-29 RX ORDER — FUROSEMIDE 10 MG/ML
120 INJECTION INTRAMUSCULAR; INTRAVENOUS ONCE
Status: COMPLETED | OUTPATIENT
Start: 2025-05-29 | End: 2025-05-29

## 2025-05-29 RX ORDER — SODIUM CHLORIDE 0.9 % (FLUSH) 0.9 %
5-40 SYRINGE (ML) INJECTION ONCE
Start: 2025-06-05 | End: 2025-06-05

## 2025-05-29 RX ORDER — FUROSEMIDE 10 MG/ML
120 INJECTION INTRAMUSCULAR; INTRAVENOUS ONCE
Start: 2025-06-05 | End: 2025-06-05

## 2025-05-29 RX ORDER — SODIUM CHLORIDE 0.9 % (FLUSH) 0.9 %
5-40 SYRINGE (ML) INJECTION ONCE
Status: COMPLETED | OUTPATIENT
Start: 2025-05-29 | End: 2025-05-29

## 2025-05-29 RX ADMIN — FUROSEMIDE 20 MG/HR: 10 INJECTION, SOLUTION INTRAMUSCULAR; INTRAVENOUS at 10:22

## 2025-05-29 RX ADMIN — SODIUM CHLORIDE, PRESERVATIVE FREE 10 ML: 5 INJECTION INTRAVENOUS at 10:00

## 2025-05-29 RX ADMIN — FUROSEMIDE 120 MG: 10 INJECTION, SOLUTION INTRAMUSCULAR; INTRAVENOUS at 10:01

## 2025-05-29 ASSESSMENT — PAIN SCALES - GENERAL: PAINLEVEL_OUTOF10: 10

## 2025-05-29 NOTE — PROGRESS NOTES
Patient ate grilled chicken breast on white bread, chicken noodle soup and fruit for lunch. Pt denies symptoms of low blood sugar.

## 2025-05-29 NOTE — PROGRESS NOTES
Patient blood sugar read 79 per her dexcom . Patient denies symptoms. Instructed patient and her  the importance to have protein with breakfast. Comprehension noted. Ordered patient a grilled chicken sandwich from cafeteria.

## 2025-05-29 NOTE — PROGRESS NOTES
Patient arrived, accompanied by , to center per wheel chair for lab work and potential lasix treatment. Patient explained that she had a recent fall this past week and had followed up with per primary physician, Mariana Simental. Office visit summary with  left rib fracture vs rib contusion noted. Patient rates pain as a 10 which has not changed since incident. Repositioned pt in recliner and ice pack applied to left rib area with noted light purple bruising.

## 2025-05-29 NOTE — PROGRESS NOTES
Labs had been reviewed with Fabi ALLEN prior to administration 120 mg Lasix IVP given over 6 minutes, followed by Lasix infusion 20 mg/hr over 4 hours completed, see MAR. Pt tolerated well, voided in restroom x 5. To return next week for weekly labs and IV lasix. Reviewed Lasix AVS and denied need for hard copy. Instructed patient and her  if patient's SOB increases or left rib increase or begins to radiates to notify primary care physician and or go to the emergency room. Discharged to car per wheel chair with O2 at 2L per NC, accompanied by .

## 2025-06-05 ENCOUNTER — HOSPITAL ENCOUNTER (OUTPATIENT)
Dept: ONCOLOGY | Age: 55
Setting detail: INFUSION SERIES
Discharge: HOME | End: 2025-06-05
Payer: COMMERCIAL

## 2025-06-05 ENCOUNTER — RESULTS FOLLOW-UP (OUTPATIENT)
Dept: CARDIOLOGY CLINIC | Age: 55
End: 2025-06-05

## 2025-06-05 VITALS
TEMPERATURE: 97.1 F | DIASTOLIC BLOOD PRESSURE: 68 MMHG | BODY MASS INDEX: 36.57 KG/M2 | SYSTOLIC BLOOD PRESSURE: 121 MMHG | WEIGHT: 213.04 LBS | RESPIRATION RATE: 16 BRPM | HEART RATE: 68 BPM

## 2025-06-05 DIAGNOSIS — R06.02 SHORTNESS OF BREATH: ICD-10-CM

## 2025-06-05 DIAGNOSIS — I50.32 CHRONIC DIASTOLIC CONGESTIVE HEART FAILURE (HCC): Primary | ICD-10-CM

## 2025-06-05 DIAGNOSIS — I50.32 CHRONIC HEART FAILURE WITH PRESERVED EJECTION FRACTION (HCC): ICD-10-CM

## 2025-06-05 LAB
ANION GAP SERPL CALCULATED.3IONS-SCNC: 12 MMOL/L (ref 3–16)
BUN SERPL-MCNC: 61 MG/DL (ref 7–20)
CALCIUM SERPL-MCNC: 9.3 MG/DL (ref 8.3–10.6)
CHLORIDE SERPL-SCNC: 93 MMOL/L (ref 99–110)
CO2 SERPL-SCNC: 32 MMOL/L (ref 21–32)
CREAT SERPL-MCNC: 1.5 MG/DL (ref 0.6–1.1)
GFR SERPLBLD CREATININE-BSD FMLA CKD-EPI: 41 ML/MIN/{1.73_M2}
GLUCOSE SERPL-MCNC: 131 MG/DL (ref 70–99)
NT-PROBNP SERPL-MCNC: 144 PG/ML (ref 0–124)
POTASSIUM SERPL-SCNC: 4.1 MMOL/L (ref 3.5–5.1)
SODIUM SERPL-SCNC: 137 MMOL/L (ref 136–145)

## 2025-06-05 PROCEDURE — 83880 ASSAY OF NATRIURETIC PEPTIDE: CPT

## 2025-06-05 PROCEDURE — 80048 BASIC METABOLIC PNL TOTAL CA: CPT

## 2025-06-05 PROCEDURE — 2500000003 HC RX 250 WO HCPCS: Performed by: INTERNAL MEDICINE

## 2025-06-05 PROCEDURE — 6360000002 HC RX W HCPCS: Performed by: INTERNAL MEDICINE

## 2025-06-05 PROCEDURE — 96365 THER/PROPH/DIAG IV INF INIT: CPT

## 2025-06-05 PROCEDURE — 96375 TX/PRO/DX INJ NEW DRUG ADDON: CPT

## 2025-06-05 PROCEDURE — 2580000003 HC RX 258: Performed by: INTERNAL MEDICINE

## 2025-06-05 PROCEDURE — 99211 OFF/OP EST MAY X REQ PHY/QHP: CPT

## 2025-06-05 PROCEDURE — 96366 THER/PROPH/DIAG IV INF ADDON: CPT

## 2025-06-05 RX ORDER — SODIUM CHLORIDE 0.9 % (FLUSH) 0.9 %
5-40 SYRINGE (ML) INJECTION ONCE
Status: COMPLETED | OUTPATIENT
Start: 2025-06-05 | End: 2025-06-05

## 2025-06-05 RX ORDER — FUROSEMIDE 10 MG/ML
120 INJECTION INTRAMUSCULAR; INTRAVENOUS ONCE
Status: COMPLETED | OUTPATIENT
Start: 2025-06-05 | End: 2025-06-05

## 2025-06-05 RX ORDER — SODIUM CHLORIDE 0.9 % (FLUSH) 0.9 %
5-40 SYRINGE (ML) INJECTION ONCE
Status: CANCELLED
Start: 2025-06-12 | End: 2025-06-12

## 2025-06-05 RX ORDER — FUROSEMIDE 10 MG/ML
120 INJECTION INTRAMUSCULAR; INTRAVENOUS ONCE
Status: CANCELLED
Start: 2025-06-12 | End: 2025-06-12

## 2025-06-05 RX ADMIN — FUROSEMIDE 120 MG: 10 INJECTION, SOLUTION INTRAMUSCULAR; INTRAVENOUS at 10:12

## 2025-06-05 RX ADMIN — FUROSEMIDE 20 MG/HR: 10 INJECTION, SOLUTION INTRAMUSCULAR; INTRAVENOUS at 10:20

## 2025-06-05 RX ADMIN — Medication 10 ML: at 10:21

## 2025-06-05 NOTE — PROGRESS NOTES
Labs had been reviewed with Fabi ALLEN prior to administration  mg Lasix given over 6 minutes, followed by Lasix infusion 20 mg/hr over 4 hours completed, see MAR. Pt tolerated well, voided in restroom x4. Patient ate breakfast and lunch during infusion. Blood sugars remained stable. Pt discharged per wheel chair to car accompanied by .  To return next week for weekly IV labs and lasix.

## 2025-06-11 NOTE — PROGRESS NOTES
fraction:    BNP  6/26/25: 141    ECHO  3/7/24:  EF 60%    Heart Failure Therapies:  The 4 Pillars of Heart Failure Therapies     1. ACE inhibitors, ARBs, or ARNI (Entresto):  NONE  2. Beta blockers:  metoprolol tartrate (LOPRESSOR) 25 MG  3. Aldosterone blockers:  spironolactone (ALDACTONE) 50 MG tablet  4. SGLT2 inhibitors:  empagliflozin (JARDIANCE) 10 MG    Complains of chronic SOB, unchanged. Denies chest pain and palpitations today. Complains of dizziness. Edema to left hand is worse, right hand edema noted as well. Edema to bilateral lower extremities.  PLAN: Routine labs/testing: per Mariana Simental DO + echo    Coronary artery disease, non obstructive  clopidogrel - 75 MG  rosuvastatin - 20 MG     Complains of chronic SOB, unchanged. Denies chest pain and palpitations today. Complains of dizziness. Edema to left hand is worse, right hand edema noted as well. Edema to bilateral lower extremities.  PLAN: Routine labs: per Mariana Simental DO     SOB (shortness of breath), chronic     Complains of chronic SOB, unchanged. Oxygen 94% on 2L via NC    Plan: Informed to call my office if any new or worsening symptoms. Pt expressed understanding and agrees with plan.     Essential hypertension  Metoprolol (Lopressor)  , JARDIANCE (empagliflozin) , Spironolactone (Aldactone), Metolazone , and torsemide (DEMADEX)     Complains of chronic SOB, unchanged. Denies chest pain and palpitations today. Complains of dizziness. Edema to left hand is worse, right hand edema noted as well. Edema to bilateral lower extremities.  Plan: Routine labs per Mariana Simental DO. Keep systolic -130    LUIS (obstructive sleep apnea)  On CPAP: yes  Compliant on CPAP: yes    PLAN: Follows with Dior Olivo MD Pulmonology.    Hyperlipidemia  clopidogrel - 75 MG  rosuvastatin - 20 MG  4/17/25: , , HDL 34, LDL 50    Plan: per Mariana Simental DO  Goal: with CAD keep LDL <70; without CAD, <100    Renal

## 2025-06-12 ENCOUNTER — HOSPITAL ENCOUNTER (OUTPATIENT)
Dept: ONCOLOGY | Age: 55
Setting detail: INFUSION SERIES
Discharge: HOME | End: 2025-06-12
Payer: COMMERCIAL

## 2025-06-12 VITALS
WEIGHT: 218.8 LBS | DIASTOLIC BLOOD PRESSURE: 75 MMHG | HEART RATE: 75 BPM | SYSTOLIC BLOOD PRESSURE: 131 MMHG | RESPIRATION RATE: 16 BRPM | BODY MASS INDEX: 37.56 KG/M2 | TEMPERATURE: 96.9 F | OXYGEN SATURATION: 98 %

## 2025-06-12 DIAGNOSIS — I50.32 CHRONIC DIASTOLIC CONGESTIVE HEART FAILURE (HCC): Primary | ICD-10-CM

## 2025-06-12 DIAGNOSIS — I50.32 CHRONIC HEART FAILURE WITH PRESERVED EJECTION FRACTION (HCC): ICD-10-CM

## 2025-06-12 DIAGNOSIS — R06.02 SHORTNESS OF BREATH: ICD-10-CM

## 2025-06-12 LAB
ANION GAP SERPL CALCULATED.3IONS-SCNC: 14 MMOL/L (ref 3–16)
BUN SERPL-MCNC: 54 MG/DL (ref 7–20)
CALCIUM SERPL-MCNC: 9.2 MG/DL (ref 8.3–10.6)
CHLORIDE SERPL-SCNC: 95 MMOL/L (ref 99–110)
CO2 SERPL-SCNC: 30 MMOL/L (ref 21–32)
CREAT SERPL-MCNC: 1.7 MG/DL (ref 0.6–1.1)
GFR SERPLBLD CREATININE-BSD FMLA CKD-EPI: 35 ML/MIN/{1.73_M2}
GLUCOSE SERPL-MCNC: 79 MG/DL (ref 70–99)
NT-PROBNP SERPL-MCNC: 173 PG/ML (ref 0–124)
POTASSIUM SERPL-SCNC: 4.3 MMOL/L (ref 3.5–5.1)
SODIUM SERPL-SCNC: 139 MMOL/L (ref 136–145)

## 2025-06-12 PROCEDURE — 2500000003 HC RX 250 WO HCPCS: Performed by: INTERNAL MEDICINE

## 2025-06-12 PROCEDURE — 96366 THER/PROPH/DIAG IV INF ADDON: CPT

## 2025-06-12 PROCEDURE — 2580000003 HC RX 258: Performed by: INTERNAL MEDICINE

## 2025-06-12 PROCEDURE — 83880 ASSAY OF NATRIURETIC PEPTIDE: CPT

## 2025-06-12 PROCEDURE — 80048 BASIC METABOLIC PNL TOTAL CA: CPT

## 2025-06-12 PROCEDURE — 96365 THER/PROPH/DIAG IV INF INIT: CPT

## 2025-06-12 PROCEDURE — 99211 OFF/OP EST MAY X REQ PHY/QHP: CPT

## 2025-06-12 PROCEDURE — 6360000002 HC RX W HCPCS: Performed by: INTERNAL MEDICINE

## 2025-06-12 RX ORDER — SODIUM CHLORIDE 0.9 % (FLUSH) 0.9 %
5-40 SYRINGE (ML) INJECTION ONCE
Status: COMPLETED | OUTPATIENT
Start: 2025-06-12 | End: 2025-06-12

## 2025-06-12 RX ORDER — SODIUM CHLORIDE 0.9 % (FLUSH) 0.9 %
5-40 SYRINGE (ML) INJECTION ONCE
Status: CANCELLED
Start: 2025-06-19 | End: 2025-06-19

## 2025-06-12 RX ORDER — FUROSEMIDE 10 MG/ML
120 INJECTION INTRAMUSCULAR; INTRAVENOUS ONCE
Status: COMPLETED | OUTPATIENT
Start: 2025-06-12 | End: 2025-06-12

## 2025-06-12 RX ORDER — FUROSEMIDE 10 MG/ML
120 INJECTION INTRAMUSCULAR; INTRAVENOUS ONCE
Status: CANCELLED
Start: 2025-06-19 | End: 2025-06-19

## 2025-06-12 RX ADMIN — Medication 10 ML: at 10:00

## 2025-06-12 RX ADMIN — FUROSEMIDE 120 MG: 10 INJECTION, SOLUTION INTRAMUSCULAR; INTRAVENOUS at 10:01

## 2025-06-12 RX ADMIN — FUROSEMIDE 20 MG/HR: 10 INJECTION, SOLUTION INTRAMUSCULAR; INTRAVENOUS at 10:07

## 2025-06-12 NOTE — PROGRESS NOTES
Pt to Dept for Lasix IVP and Lasix gtt as ordered from Dr. Parsons. Weight obtained and Labs drawn this AM. Results reviewed per Nhung Fernandez CNP ,Ok to proceed with Tx as ordered today. Nhung updated, Pt reporting 5lb wt gain in 1 day and not taking Metolazone on Mondays,verified Pt has a refill to [facility]. Pt instructed to follow up with [facility] if issues with getting prescription refilled. Pt took Metolazone as ordered only this Am.  Lasix 120mg given IVP over 6 minutes followed by Lasix gtt started at 20mg/hr for 4 hours. IVP lasix and lasix gtt started per Nicholas OVIEDO RN . Pt scheduled to return next Thursday for same treatment. Pt discharged home at this time per W/C with Home O2 with .

## 2025-06-13 NOTE — PATIENT INSTRUCTIONS
[facility]  [address]   [place], [place] [address]  Telephone: [phone]     FAX [phone]     Discharge Instructions     Important reminders:     **If you have any signs and symptoms of illness (Cough, fever, congestion, nausea, vomiting, diarrhea, etc.) please call the wound care center prior to your appointment.     1. Increase Protein intake for optimal wound healing  2. No added salt to reduce any swelling  3. If diabetic, maintain good glucose control  4. If you smoke, smoking prohibits wound healing, we ask that you refrain from smoking.  5. When taking antibiotics take the entire prescription as ordered. Do not stop taking until medication is all gone unless otherwise instructed.   6. Exercise as tolerated.   7. Keep weight off wounds and reposition every 2 hours if applicable.  8. If wound(s) is on your lower extremity, elevate legs to the level of the heart or above for 30 minutes 4-5 times a day and/or when sitting. Avoid standing for long periods of time.   9. Do not get wounds wet in bath or shower unless otherwise instructed by your physician. If your wound is on your foot or leg, you may purchase a cast bag. Please ask at the pharmacy.        If Vascular testing is ordered, please call [phone] to schedule.     Vascular tests ordered by Wound Care Physicians may take up to 2 hours to complete. Please keep that in mind when scheduling.      If Vascular testing is scheduled, please bring supplies to replace your dressing after testing is done. The vascular department does not stock supplies.      Wound: Left heel      With each dressing change, rinse wounds with 0.9% Saline. (May use wound wash or soft contact solution. Both can be purchased at a local drug store). If unable to obtain saline, may use a gentle soap and water.     Dressing care: Mupirocin ointment daily, in wound care cover with gauze and tape at home cover with a clean sock or place a piece of saran

## 2025-06-16 ENCOUNTER — HOSPITAL ENCOUNTER (OUTPATIENT)
Dept: WOUND CARE | Age: 55
Discharge: HOME | End: 2025-06-16
Payer: COMMERCIAL

## 2025-06-16 VITALS
TEMPERATURE: 97.8 F | DIASTOLIC BLOOD PRESSURE: 70 MMHG | HEART RATE: 71 BPM | RESPIRATION RATE: 20 BRPM | SYSTOLIC BLOOD PRESSURE: 126 MMHG

## 2025-06-16 DIAGNOSIS — L97.422 CHRONIC HEEL ULCER, LEFT, WITH FAT LAYER EXPOSED (HCC): Primary | ICD-10-CM

## 2025-06-16 PROCEDURE — 11042 DBRDMT SUBQ TIS 1ST 20SQCM/<: CPT

## 2025-06-16 PROCEDURE — 11721 DEBRIDE NAIL 6 OR MORE: CPT

## 2025-06-16 RX ORDER — MUPIROCIN 2 %
OINTMENT (GRAM) TOPICAL
Qty: 30 G | Refills: 1 | Status: SHIPPED | OUTPATIENT
Start: 2025-06-16

## 2025-06-16 RX ORDER — LIDOCAINE 50 MG/G
OINTMENT TOPICAL PRN
OUTPATIENT
Start: 2025-06-16

## 2025-06-16 RX ORDER — TRIAMCINOLONE ACETONIDE 1 MG/G
OINTMENT TOPICAL PRN
OUTPATIENT
Start: 2025-06-16

## 2025-06-16 RX ORDER — SILVER SULFADIAZINE 10 MG/G
CREAM TOPICAL PRN
OUTPATIENT
Start: 2025-06-16

## 2025-06-16 RX ORDER — BETAMETHASONE DIPROPIONATE 0.5 MG/G
CREAM TOPICAL PRN
OUTPATIENT
Start: 2025-06-16

## 2025-06-16 RX ORDER — LIDOCAINE 40 MG/G
CREAM TOPICAL PRN
Status: DISCONTINUED | OUTPATIENT
Start: 2025-06-16 | End: 2025-06-17 | Stop reason: HOSPADM

## 2025-06-16 RX ORDER — GINSENG 100 MG
CAPSULE ORAL PRN
OUTPATIENT
Start: 2025-06-16

## 2025-06-16 RX ORDER — SODIUM CHLOR/HYPOCHLOROUS ACID 0.033 %
SOLUTION, IRRIGATION IRRIGATION PRN
OUTPATIENT
Start: 2025-06-16

## 2025-06-16 RX ORDER — NEOMYCIN/BACITRACIN/POLYMYXINB 3.5-400-5K
OINTMENT (GRAM) TOPICAL PRN
OUTPATIENT
Start: 2025-06-16

## 2025-06-16 RX ORDER — CLOBETASOL PROPIONATE 0.5 MG/G
OINTMENT TOPICAL PRN
OUTPATIENT
Start: 2025-06-16

## 2025-06-16 RX ORDER — LIDOCAINE HYDROCHLORIDE 20 MG/ML
JELLY TOPICAL PRN
OUTPATIENT
Start: 2025-06-16

## 2025-06-16 RX ORDER — BACITRACIN ZINC AND POLYMYXIN B SULFATE 500; 1000 [USP'U]/G; [USP'U]/G
OINTMENT TOPICAL PRN
OUTPATIENT
Start: 2025-06-16

## 2025-06-16 RX ORDER — LIDOCAINE HYDROCHLORIDE 40 MG/ML
SOLUTION TOPICAL PRN
OUTPATIENT
Start: 2025-06-16

## 2025-06-16 RX ORDER — LIDOCAINE 40 MG/G
CREAM TOPICAL PRN
OUTPATIENT
Start: 2025-06-16

## 2025-06-16 RX ORDER — GENTAMICIN SULFATE 1 MG/G
OINTMENT TOPICAL PRN
OUTPATIENT
Start: 2025-06-16

## 2025-06-16 RX ORDER — MUPIROCIN 2 %
OINTMENT (GRAM) TOPICAL PRN
OUTPATIENT
Start: 2025-06-16

## 2025-06-16 ASSESSMENT — PAIN DESCRIPTION - ORIENTATION: ORIENTATION: LEFT;RIGHT

## 2025-06-16 ASSESSMENT — PAIN DESCRIPTION - PAIN TYPE: TYPE: CHRONIC PAIN

## 2025-06-16 ASSESSMENT — PAIN - FUNCTIONAL ASSESSMENT: PAIN_FUNCTIONAL_ASSESSMENT: PREVENTS OR INTERFERES SOME ACTIVE ACTIVITIES AND ADLS

## 2025-06-16 ASSESSMENT — PAIN DESCRIPTION - LOCATION: LOCATION: BACK;LEG

## 2025-06-16 NOTE — PLAN OF CARE
Discharge instructions given.  Patient verbalized understanding.  Return to [facility] in 4 week(s).

## 2025-06-16 NOTE — PROGRESS NOTES
[facility]  Progress Note and Procedure Note      Crow Bonner  AGE: 55 y.o.   GENDER: female  : 1970  TODAY'S DATE:  2025    Subjective:     Chief Complaint   Patient presents with    Wound Check     Bilateral heels         HISTORY of PRESENT ILLNESS HPI     Crow Bonner is a 55 y.o. female who presents today for wound evaluation.   History of Wound: L heel wound with dry cracked skin.   Wound Pain:  intermittent  Severity:  3 / 10   Wound Type:  diabetic  Modifying Factors:  diabetes, chronic pressure, decreased mobility, and shear force  Associated Signs/Symptoms:  numbness        PAST MEDICAL HISTORY        Diagnosis Date    Acute CVA (cerebrovascular accident) ([facility]) 2023    Anxiety     Anxiety and depression     Arthritis     not sure of specific type    Asthma     CAD (coronary artery disease)     Cancer ([facility])     left breast    Cerebral artery occlusion with cerebral infarction ([facility])     ,     CHF (congestive heart failure) ([facility])     Chronic kidney disease     renal insufficency/to see Dr Romario Saeed    Chronic pain     Constipation     COPD (chronic obstructive pulmonary disease) ([facility])     Depression     Diabetes mellitus ([facility])     Diabetic polyneuropathy associated with type 2 diabetes mellitus ([facility]) 2018    Dysthymia 2018    ESBL (extended spectrum beta-lactamase) producing bacteria infection 2018    urine    Fibromyalgia     Gastric ulcer, unspecified as acute or chronic, without mention of hemorrhage, perforation, or obstruction     GERD (gastroesophageal reflux disease)     Gout     HIGH CHOLESTEROL     Hypertension     Hypothyroidism     Pneumonia 2020    Pyelonephritis     Severe persistent asthma without complication ([facility]) 2018    Thyroid disease     TIA (transient ischemic attack)     with occasional left leg and hand weakness       PAST SURGICAL HISTORY    Past Surgical History:   Procedure

## 2025-06-19 ENCOUNTER — RESULTS FOLLOW-UP (OUTPATIENT)
Dept: CARDIOLOGY CLINIC | Age: 55
End: 2025-06-19

## 2025-06-19 ENCOUNTER — HOSPITAL ENCOUNTER (OUTPATIENT)
Dept: ONCOLOGY | Age: 55
Setting detail: INFUSION SERIES
Discharge: HOME | End: 2025-06-19
Payer: COMMERCIAL

## 2025-06-19 VITALS
SYSTOLIC BLOOD PRESSURE: 120 MMHG | DIASTOLIC BLOOD PRESSURE: 62 MMHG | BODY MASS INDEX: 36.49 KG/M2 | WEIGHT: 212.6 LBS | TEMPERATURE: 96.9 F | HEART RATE: 70 BPM | RESPIRATION RATE: 18 BRPM

## 2025-06-19 DIAGNOSIS — I50.32 CHRONIC HEART FAILURE WITH PRESERVED EJECTION FRACTION (HCC): ICD-10-CM

## 2025-06-19 DIAGNOSIS — R06.02 SHORTNESS OF BREATH: ICD-10-CM

## 2025-06-19 DIAGNOSIS — I50.32 CHRONIC DIASTOLIC CONGESTIVE HEART FAILURE (HCC): Primary | ICD-10-CM

## 2025-06-19 LAB
ANION GAP SERPL CALCULATED.3IONS-SCNC: 10 MMOL/L (ref 3–16)
BUN SERPL-MCNC: 32 MG/DL (ref 7–20)
CALCIUM SERPL-MCNC: 9.3 MG/DL (ref 8.3–10.6)
CHLORIDE SERPL-SCNC: 95 MMOL/L (ref 99–110)
CO2 SERPL-SCNC: 36 MMOL/L (ref 21–32)
CREAT SERPL-MCNC: 1.4 MG/DL (ref 0.6–1.1)
GFR SERPLBLD CREATININE-BSD FMLA CKD-EPI: 44 ML/MIN/{1.73_M2}
GLUCOSE SERPL-MCNC: 86 MG/DL (ref 70–99)
NT-PROBNP SERPL-MCNC: 407 PG/ML (ref 0–124)
POTASSIUM SERPL-SCNC: 4.3 MMOL/L (ref 3.5–5.1)
SODIUM SERPL-SCNC: 141 MMOL/L (ref 136–145)

## 2025-06-19 PROCEDURE — 2500000003 HC RX 250 WO HCPCS: Performed by: NURSE PRACTITIONER

## 2025-06-19 PROCEDURE — 6360000002 HC RX W HCPCS: Performed by: NURSE PRACTITIONER

## 2025-06-19 PROCEDURE — 2580000003 HC RX 258: Performed by: NURSE PRACTITIONER

## 2025-06-19 PROCEDURE — 83880 ASSAY OF NATRIURETIC PEPTIDE: CPT

## 2025-06-19 PROCEDURE — 80048 BASIC METABOLIC PNL TOTAL CA: CPT

## 2025-06-19 RX ORDER — SODIUM CHLORIDE 0.9 % (FLUSH) 0.9 %
5-40 SYRINGE (ML) INJECTION ONCE
Status: COMPLETED | OUTPATIENT
Start: 2025-06-19 | End: 2025-06-19

## 2025-06-19 RX ORDER — FUROSEMIDE 10 MG/ML
120 INJECTION INTRAMUSCULAR; INTRAVENOUS ONCE
Status: COMPLETED | OUTPATIENT
Start: 2025-06-19 | End: 2025-06-19

## 2025-06-19 RX ORDER — SODIUM CHLORIDE 0.9 % (FLUSH) 0.9 %
5-40 SYRINGE (ML) INJECTION ONCE
Status: CANCELLED
Start: 2025-06-26 | End: 2025-06-26

## 2025-06-19 RX ORDER — FUROSEMIDE 10 MG/ML
120 INJECTION INTRAMUSCULAR; INTRAVENOUS ONCE
Status: CANCELLED
Start: 2025-06-26 | End: 2025-06-26

## 2025-06-19 RX ADMIN — Medication 10 ML: at 10:20

## 2025-06-19 RX ADMIN — FUROSEMIDE 120 MG: 10 INJECTION, SOLUTION INTRAMUSCULAR; INTRAVENOUS at 10:22

## 2025-06-19 RX ADMIN — FUROSEMIDE 20 MG/HR: 10 INJECTION, SOLUTION INTRAMUSCULAR; INTRAVENOUS at 10:28

## 2025-06-19 NOTE — PROGRESS NOTES
Labs had been reviewed with Fabi ALLEN prior to administration  mg Lasix given over 6 minutes, followed by Lasix infusion 20 mg/hr over 4 hours completed, see MAR. Pt tolerated well, voided in restroom x3. Patient ate breakfast and lunch during infusion. Blood sugars remained stable. Pt discharged per wheel chair with portable O2 intact at 2 L per NC to car accompanied by .  To return next week for weekly IV labs and lasix.

## 2025-06-26 ENCOUNTER — RESULTS FOLLOW-UP (OUTPATIENT)
Dept: CARDIOLOGY CLINIC | Age: 55
End: 2025-06-26

## 2025-06-26 ENCOUNTER — HOSPITAL ENCOUNTER (OUTPATIENT)
Dept: ONCOLOGY | Age: 55
Setting detail: INFUSION SERIES
Discharge: HOME | End: 2025-06-26
Payer: COMMERCIAL

## 2025-06-26 VITALS
BODY MASS INDEX: 35.2 KG/M2 | SYSTOLIC BLOOD PRESSURE: 133 MMHG | RESPIRATION RATE: 20 BRPM | HEART RATE: 70 BPM | WEIGHT: 205.06 LBS | DIASTOLIC BLOOD PRESSURE: 73 MMHG | TEMPERATURE: 97 F

## 2025-06-26 DIAGNOSIS — I50.32 CHRONIC DIASTOLIC CONGESTIVE HEART FAILURE (HCC): Primary | ICD-10-CM

## 2025-06-26 DIAGNOSIS — R06.02 SHORTNESS OF BREATH: ICD-10-CM

## 2025-06-26 DIAGNOSIS — I50.32 CHRONIC HEART FAILURE WITH PRESERVED EJECTION FRACTION (HCC): ICD-10-CM

## 2025-06-26 LAB
ANION GAP SERPL CALCULATED.3IONS-SCNC: 14 MMOL/L (ref 3–16)
BUN SERPL-MCNC: 59 MG/DL (ref 7–20)
CALCIUM SERPL-MCNC: 9.5 MG/DL (ref 8.3–10.6)
CHLORIDE SERPL-SCNC: 90 MMOL/L (ref 99–110)
CO2 SERPL-SCNC: 32 MMOL/L (ref 21–32)
CREAT SERPL-MCNC: 1.6 MG/DL (ref 0.6–1.1)
GFR SERPLBLD CREATININE-BSD FMLA CKD-EPI: 38 ML/MIN/{1.73_M2}
GLUCOSE SERPL-MCNC: 250 MG/DL (ref 70–99)
NT-PROBNP SERPL-MCNC: 141 PG/ML (ref 0–124)
POTASSIUM SERPL-SCNC: 3.8 MMOL/L (ref 3.5–5.1)
SODIUM SERPL-SCNC: 136 MMOL/L (ref 136–145)

## 2025-06-26 PROCEDURE — 96365 THER/PROPH/DIAG IV INF INIT: CPT

## 2025-06-26 PROCEDURE — 83880 ASSAY OF NATRIURETIC PEPTIDE: CPT

## 2025-06-26 PROCEDURE — 80048 BASIC METABOLIC PNL TOTAL CA: CPT

## 2025-06-26 PROCEDURE — 96375 TX/PRO/DX INJ NEW DRUG ADDON: CPT

## 2025-06-26 PROCEDURE — 96366 THER/PROPH/DIAG IV INF ADDON: CPT

## 2025-06-26 PROCEDURE — 6360000002 HC RX W HCPCS: Performed by: NURSE PRACTITIONER

## 2025-06-26 PROCEDURE — 2500000003 HC RX 250 WO HCPCS: Performed by: NURSE PRACTITIONER

## 2025-06-26 PROCEDURE — 99211 OFF/OP EST MAY X REQ PHY/QHP: CPT

## 2025-06-26 PROCEDURE — 2580000003 HC RX 258: Performed by: NURSE PRACTITIONER

## 2025-06-26 RX ORDER — SODIUM CHLORIDE 0.9 % (FLUSH) 0.9 %
5-40 SYRINGE (ML) INJECTION ONCE
Status: CANCELLED
Start: 2025-07-03 | End: 2025-07-03

## 2025-06-26 RX ORDER — SODIUM CHLORIDE 0.9 % (FLUSH) 0.9 %
5-40 SYRINGE (ML) INJECTION ONCE
Status: COMPLETED | OUTPATIENT
Start: 2025-06-26 | End: 2025-06-26

## 2025-06-26 RX ORDER — FUROSEMIDE 10 MG/ML
120 INJECTION INTRAMUSCULAR; INTRAVENOUS ONCE
Status: CANCELLED
Start: 2025-07-03 | End: 2025-07-03

## 2025-06-26 RX ORDER — FUROSEMIDE 10 MG/ML
120 INJECTION INTRAMUSCULAR; INTRAVENOUS ONCE
Status: COMPLETED | OUTPATIENT
Start: 2025-06-26 | End: 2025-06-26

## 2025-06-26 RX ADMIN — FUROSEMIDE 120 MG: 10 INJECTION, SOLUTION INTRAMUSCULAR; INTRAVENOUS at 10:30

## 2025-06-26 RX ADMIN — SODIUM CHLORIDE, PRESERVATIVE FREE 10 ML: 5 INJECTION INTRAVENOUS at 10:38

## 2025-06-26 RX ADMIN — FUROSEMIDE 20 MG/HR: 10 INJECTION, SOLUTION INTRAMUSCULAR; INTRAVENOUS at 10:38

## 2025-06-26 NOTE — PROGRESS NOTES
Labs had been reviewed with Fabi ALLEN prior to administration  mg Lasix given over 6 minutes, followed by Lasix infusion 20 mg/hr over 4 hours completed, see MAR. Pt tolerated well, voided in restroom x4. Patient ate breakfast and lunch during infusion. Blood sugars remained stable. Pt discharged per wheel chair with portable O2 intact at 2 L per NC to car accompanied by .  To return next week for weekly IV labs and lasix.

## 2025-07-02 ENCOUNTER — OFFICE VISIT (OUTPATIENT)
Dept: CARDIOLOGY CLINIC | Age: 55
End: 2025-07-02
Payer: COMMERCIAL

## 2025-07-02 VITALS
BODY MASS INDEX: 35.85 KG/M2 | WEIGHT: 210 LBS | DIASTOLIC BLOOD PRESSURE: 58 MMHG | OXYGEN SATURATION: 94 % | SYSTOLIC BLOOD PRESSURE: 100 MMHG | HEART RATE: 67 BPM | HEIGHT: 64 IN

## 2025-07-02 DIAGNOSIS — Z79.4 TYPE 2 DIABETES MELLITUS WITH HYPERGLYCEMIA, WITH LONG-TERM CURRENT USE OF INSULIN (HCC): ICD-10-CM

## 2025-07-02 DIAGNOSIS — R06.02 SHORTNESS OF BREATH: ICD-10-CM

## 2025-07-02 DIAGNOSIS — N18.30 STAGE 3 CHRONIC KIDNEY DISEASE, UNSPECIFIED WHETHER STAGE 3A OR 3B CKD (HCC): ICD-10-CM

## 2025-07-02 DIAGNOSIS — E11.65 TYPE 2 DIABETES MELLITUS WITH HYPERGLYCEMIA, WITH LONG-TERM CURRENT USE OF INSULIN (HCC): ICD-10-CM

## 2025-07-02 DIAGNOSIS — E89.89 LYMPHEDEMA OF UPPER EXTREMITY FOLLOWING LYMPHADENECTOMY: ICD-10-CM

## 2025-07-02 DIAGNOSIS — I50.32 CHRONIC DIASTOLIC CONGESTIVE HEART FAILURE (HCC): Primary | ICD-10-CM

## 2025-07-02 DIAGNOSIS — I10 ESSENTIAL HYPERTENSION: ICD-10-CM

## 2025-07-02 DIAGNOSIS — I89.0 LYMPHEDEMA OF UPPER EXTREMITY FOLLOWING LYMPHADENECTOMY: ICD-10-CM

## 2025-07-02 DIAGNOSIS — I50.32 CHRONIC HEART FAILURE WITH PRESERVED EJECTION FRACTION (HCC): ICD-10-CM

## 2025-07-02 PROCEDURE — G8427 DOCREV CUR MEDS BY ELIG CLIN: HCPCS | Performed by: INTERNAL MEDICINE

## 2025-07-02 PROCEDURE — 3051F HG A1C>EQUAL 7.0%<8.0%: CPT | Performed by: INTERNAL MEDICINE

## 2025-07-02 PROCEDURE — 99215 OFFICE O/P EST HI 40 MIN: CPT | Performed by: INTERNAL MEDICINE

## 2025-07-02 PROCEDURE — 3078F DIAST BP <80 MM HG: CPT | Performed by: INTERNAL MEDICINE

## 2025-07-02 PROCEDURE — 1036F TOBACCO NON-USER: CPT | Performed by: INTERNAL MEDICINE

## 2025-07-02 PROCEDURE — G8417 CALC BMI ABV UP PARAM F/U: HCPCS | Performed by: INTERNAL MEDICINE

## 2025-07-02 PROCEDURE — 3017F COLORECTAL CA SCREEN DOC REV: CPT | Performed by: INTERNAL MEDICINE

## 2025-07-02 PROCEDURE — 2022F DILAT RTA XM EVC RTNOPTHY: CPT | Performed by: INTERNAL MEDICINE

## 2025-07-02 PROCEDURE — 3074F SYST BP LT 130 MM HG: CPT | Performed by: INTERNAL MEDICINE

## 2025-07-02 PROCEDURE — G2211 COMPLEX E/M VISIT ADD ON: HCPCS | Performed by: INTERNAL MEDICINE

## 2025-07-02 RX ORDER — MAGNESIUM GLUCONATE 27 MG(500)
500 TABLET ORAL DAILY
COMMUNITY

## 2025-07-02 NOTE — PATIENT INSTRUCTIONS
Continue current medications, no changes  Schedule echo as soon as possible  Follow up with Dr. Parsons or Nhung Fernandez NP in 4 months  Call my office for any worsening symptoms such as shortness of breath, chest pain, sudden weight gain or loss, or any increased swelling: Phone: [phone] or Fax: [phone]

## 2025-07-03 ENCOUNTER — HOSPITAL ENCOUNTER (OUTPATIENT)
Dept: ONCOLOGY | Age: 55
Setting detail: INFUSION SERIES
Discharge: HOME | End: 2025-07-03
Payer: COMMERCIAL

## 2025-07-03 VITALS — WEIGHT: 205.1 LBS | BODY MASS INDEX: 35.21 KG/M2

## 2025-07-03 DIAGNOSIS — R06.02 SHORTNESS OF BREATH: ICD-10-CM

## 2025-07-03 DIAGNOSIS — I50.32 CHRONIC HEART FAILURE WITH PRESERVED EJECTION FRACTION (HCC): ICD-10-CM

## 2025-07-03 DIAGNOSIS — I50.32 CHRONIC DIASTOLIC CONGESTIVE HEART FAILURE (HCC): Primary | ICD-10-CM

## 2025-07-03 LAB
ANION GAP SERPL CALCULATED.3IONS-SCNC: 12 MMOL/L (ref 3–16)
BUN SERPL-MCNC: 48 MG/DL (ref 7–20)
CALCIUM SERPL-MCNC: 9.3 MG/DL (ref 8.3–10.6)
CHLORIDE SERPL-SCNC: 90 MMOL/L (ref 99–110)
CO2 SERPL-SCNC: 35 MMOL/L (ref 21–32)
CREAT SERPL-MCNC: 1.4 MG/DL (ref 0.6–1.1)
GFR SERPLBLD CREATININE-BSD FMLA CKD-EPI: 44 ML/MIN/{1.73_M2}
GLUCOSE SERPL-MCNC: 221 MG/DL (ref 70–99)
NT-PROBNP SERPL-MCNC: 178 PG/ML (ref 0–124)
POTASSIUM SERPL-SCNC: 4.2 MMOL/L (ref 3.5–5.1)
SODIUM SERPL-SCNC: 137 MMOL/L (ref 136–145)

## 2025-07-03 PROCEDURE — 96375 TX/PRO/DX INJ NEW DRUG ADDON: CPT

## 2025-07-03 PROCEDURE — 2500000003 HC RX 250 WO HCPCS: Performed by: NURSE PRACTITIONER

## 2025-07-03 PROCEDURE — 83880 ASSAY OF NATRIURETIC PEPTIDE: CPT

## 2025-07-03 PROCEDURE — 96365 THER/PROPH/DIAG IV INF INIT: CPT

## 2025-07-03 PROCEDURE — 6360000002 HC RX W HCPCS: Performed by: NURSE PRACTITIONER

## 2025-07-03 PROCEDURE — 96366 THER/PROPH/DIAG IV INF ADDON: CPT

## 2025-07-03 PROCEDURE — 2580000003 HC RX 258: Performed by: NURSE PRACTITIONER

## 2025-07-03 PROCEDURE — 80048 BASIC METABOLIC PNL TOTAL CA: CPT

## 2025-07-03 PROCEDURE — 99211 OFF/OP EST MAY X REQ PHY/QHP: CPT

## 2025-07-03 PROCEDURE — 96374 THER/PROPH/DIAG INJ IV PUSH: CPT

## 2025-07-03 RX ORDER — FUROSEMIDE 10 MG/ML
120 INJECTION INTRAMUSCULAR; INTRAVENOUS ONCE
Start: 2025-07-10 | End: 2025-07-10

## 2025-07-03 RX ORDER — FUROSEMIDE 10 MG/ML
120 INJECTION INTRAMUSCULAR; INTRAVENOUS ONCE
Status: COMPLETED | OUTPATIENT
Start: 2025-07-03 | End: 2025-07-03

## 2025-07-03 RX ORDER — RANOLAZINE 500 MG/1
500 TABLET, EXTENDED RELEASE ORAL 2 TIMES DAILY
Qty: 180 TABLET | Refills: 3 | Status: SHIPPED | OUTPATIENT
Start: 2025-07-03

## 2025-07-03 RX ORDER — SODIUM CHLORIDE 0.9 % (FLUSH) 0.9 %
5-40 SYRINGE (ML) INJECTION ONCE
Status: COMPLETED | OUTPATIENT
Start: 2025-07-03 | End: 2025-07-03

## 2025-07-03 RX ORDER — SODIUM CHLORIDE 0.9 % (FLUSH) 0.9 %
5-40 SYRINGE (ML) INJECTION ONCE
Start: 2025-07-10 | End: 2025-07-10

## 2025-07-03 RX ADMIN — Medication 10 ML: at 09:55

## 2025-07-03 RX ADMIN — FUROSEMIDE 120 MG: 10 INJECTION, SOLUTION INTRAMUSCULAR; INTRAVENOUS at 09:55

## 2025-07-03 RX ADMIN — FUROSEMIDE 20 MG/HR: 10 INJECTION, SOLUTION INTRAMUSCULAR; INTRAVENOUS at 10:01

## 2025-07-03 NOTE — TELEPHONE ENCOUNTER
Ranolazine 500 mg     Last OV: 07/02/2025  Last labs/ EKG: Labs 06/26/2025  Appt scheduled : 11/03/2025

## 2025-07-03 NOTE — PROGRESS NOTES
Labs reviewed prior to infusion, order parameters to hold if potassium under 3 or creatinine above 1.6 . mg Lasix given over 6 minutes, followed by starting Lasix infusion 20 mg/hr over to be given over 4 hours, see MAR. Pt tolerating well. No needs voiced at this time.

## 2025-07-07 ENCOUNTER — TELEPHONE (OUTPATIENT)
Dept: CARDIOLOGY CLINIC | Age: 55
End: 2025-07-07

## 2025-07-07 NOTE — TELEPHONE ENCOUNTER
LVM to reschedule to another day  Insurance is pending and patient prefer female tech.     Please offer Monday July 14 when Clarissa is in the office.

## 2025-07-10 ENCOUNTER — HOSPITAL ENCOUNTER (OUTPATIENT)
Dept: ONCOLOGY | Age: 55
Setting detail: INFUSION SERIES
Discharge: HOME | End: 2025-07-10
Payer: COMMERCIAL

## 2025-07-10 VITALS
RESPIRATION RATE: 20 BRPM | TEMPERATURE: 97 F | HEART RATE: 70 BPM | SYSTOLIC BLOOD PRESSURE: 123 MMHG | DIASTOLIC BLOOD PRESSURE: 66 MMHG

## 2025-07-10 DIAGNOSIS — R06.02 SHORTNESS OF BREATH: ICD-10-CM

## 2025-07-10 DIAGNOSIS — I50.32 CHRONIC DIASTOLIC CONGESTIVE HEART FAILURE (HCC): Primary | ICD-10-CM

## 2025-07-10 DIAGNOSIS — I50.32 CHRONIC HEART FAILURE WITH PRESERVED EJECTION FRACTION (HCC): ICD-10-CM

## 2025-07-10 LAB
ANION GAP SERPL CALCULATED.3IONS-SCNC: 11 MMOL/L (ref 3–16)
BUN SERPL-MCNC: 41 MG/DL (ref 7–20)
CALCIUM SERPL-MCNC: 9.5 MG/DL (ref 8.3–10.6)
CHLORIDE SERPL-SCNC: 92 MMOL/L (ref 99–110)
CO2 SERPL-SCNC: 33 MMOL/L (ref 21–32)
CREAT SERPL-MCNC: 1.4 MG/DL (ref 0.6–1.1)
GFR SERPLBLD CREATININE-BSD FMLA CKD-EPI: 44 ML/MIN/{1.73_M2}
GLUCOSE SERPL-MCNC: 231 MG/DL (ref 70–99)
NT-PROBNP SERPL-MCNC: 147 PG/ML (ref 0–124)
POTASSIUM SERPL-SCNC: 5.2 MMOL/L (ref 3.5–5.1)
SODIUM SERPL-SCNC: 136 MMOL/L (ref 136–145)

## 2025-07-10 PROCEDURE — 80048 BASIC METABOLIC PNL TOTAL CA: CPT

## 2025-07-10 PROCEDURE — 2500000003 HC RX 250 WO HCPCS: Performed by: NURSE PRACTITIONER

## 2025-07-10 PROCEDURE — 96365 THER/PROPH/DIAG IV INF INIT: CPT

## 2025-07-10 PROCEDURE — 96366 THER/PROPH/DIAG IV INF ADDON: CPT

## 2025-07-10 PROCEDURE — 2580000003 HC RX 258: Performed by: NURSE PRACTITIONER

## 2025-07-10 PROCEDURE — 6360000002 HC RX W HCPCS: Performed by: NURSE PRACTITIONER

## 2025-07-10 PROCEDURE — 83880 ASSAY OF NATRIURETIC PEPTIDE: CPT

## 2025-07-10 PROCEDURE — 96375 TX/PRO/DX INJ NEW DRUG ADDON: CPT

## 2025-07-10 RX ORDER — FUROSEMIDE 10 MG/ML
120 INJECTION INTRAMUSCULAR; INTRAVENOUS ONCE
Status: CANCELLED
Start: 2025-07-17 | End: 2025-07-17

## 2025-07-10 RX ORDER — SODIUM CHLORIDE 0.9 % (FLUSH) 0.9 %
5-40 SYRINGE (ML) INJECTION ONCE
Status: COMPLETED | OUTPATIENT
Start: 2025-07-10 | End: 2025-07-10

## 2025-07-10 RX ORDER — FUROSEMIDE 10 MG/ML
120 INJECTION INTRAMUSCULAR; INTRAVENOUS ONCE
Status: COMPLETED | OUTPATIENT
Start: 2025-07-10 | End: 2025-07-10

## 2025-07-10 RX ORDER — SODIUM CHLORIDE 0.9 % (FLUSH) 0.9 %
5-40 SYRINGE (ML) INJECTION ONCE
Status: CANCELLED
Start: 2025-07-17 | End: 2025-07-17

## 2025-07-10 RX ADMIN — Medication 10 ML: at 09:15

## 2025-07-10 RX ADMIN — FUROSEMIDE 120 MG: 10 INJECTION, SOLUTION INTRAMUSCULAR; INTRAVENOUS at 09:58

## 2025-07-10 RX ADMIN — FUROSEMIDE 20 MG/HR: 10 INJECTION, SOLUTION INTRAMUSCULAR; INTRAVENOUS at 10:09

## 2025-07-10 NOTE — PROGRESS NOTES
Labs reviewed prior to infusion, order parameters to hold if potassium under 3 or creatinine above 1.6 . mg Lasix given over 6 minutes, followed by starting Lasix infusion 20 mg/hr over to be given over 4 hours, see MAR. Pt tolerating well. No needs voiced at this time. AVS reviewed with most common side effects. Pt denied need for hard copy. Pt discharged to car per wheel chair accompanied by .

## 2025-07-17 ENCOUNTER — HOSPITAL ENCOUNTER (OUTPATIENT)
Dept: ONCOLOGY | Age: 55
Setting detail: INFUSION SERIES
Discharge: HOME | End: 2025-07-17
Payer: COMMERCIAL

## 2025-07-17 VITALS
RESPIRATION RATE: 16 BRPM | DIASTOLIC BLOOD PRESSURE: 68 MMHG | SYSTOLIC BLOOD PRESSURE: 111 MMHG | TEMPERATURE: 96.8 F | WEIGHT: 212.5 LBS | BODY MASS INDEX: 36.48 KG/M2

## 2025-07-17 DIAGNOSIS — I50.32 CHRONIC DIASTOLIC CONGESTIVE HEART FAILURE (HCC): Primary | ICD-10-CM

## 2025-07-17 DIAGNOSIS — R06.02 SHORTNESS OF BREATH: ICD-10-CM

## 2025-07-17 DIAGNOSIS — I50.32 CHRONIC HEART FAILURE WITH PRESERVED EJECTION FRACTION (HCC): ICD-10-CM

## 2025-07-17 LAB
ANION GAP SERPL CALCULATED.3IONS-SCNC: 13 MMOL/L (ref 3–16)
BUN SERPL-MCNC: 41 MG/DL (ref 7–20)
CALCIUM SERPL-MCNC: 9.6 MG/DL (ref 8.3–10.6)
CHLORIDE SERPL-SCNC: 89 MMOL/L (ref 99–110)
CO2 SERPL-SCNC: 36 MMOL/L (ref 21–32)
CREAT SERPL-MCNC: 1.4 MG/DL (ref 0.6–1.1)
GFR SERPLBLD CREATININE-BSD FMLA CKD-EPI: 44 ML/MIN/{1.73_M2}
GLUCOSE SERPL-MCNC: 85 MG/DL (ref 70–99)
NT-PROBNP SERPL-MCNC: 223 PG/ML (ref 0–124)
POTASSIUM SERPL-SCNC: 3.9 MMOL/L (ref 3.5–5.1)
SODIUM SERPL-SCNC: 138 MMOL/L (ref 136–145)

## 2025-07-17 PROCEDURE — 99211 OFF/OP EST MAY X REQ PHY/QHP: CPT

## 2025-07-17 PROCEDURE — 96366 THER/PROPH/DIAG IV INF ADDON: CPT

## 2025-07-17 PROCEDURE — 2580000003 HC RX 258: Performed by: NURSE PRACTITIONER

## 2025-07-17 PROCEDURE — 83880 ASSAY OF NATRIURETIC PEPTIDE: CPT

## 2025-07-17 PROCEDURE — 2500000003 HC RX 250 WO HCPCS: Performed by: NURSE PRACTITIONER

## 2025-07-17 PROCEDURE — 6360000002 HC RX W HCPCS: Performed by: NURSE PRACTITIONER

## 2025-07-17 PROCEDURE — 96375 TX/PRO/DX INJ NEW DRUG ADDON: CPT

## 2025-07-17 PROCEDURE — 80048 BASIC METABOLIC PNL TOTAL CA: CPT

## 2025-07-17 PROCEDURE — 96365 THER/PROPH/DIAG IV INF INIT: CPT

## 2025-07-17 RX ORDER — FUROSEMIDE 10 MG/ML
120 INJECTION INTRAMUSCULAR; INTRAVENOUS ONCE
Status: COMPLETED | OUTPATIENT
Start: 2025-07-17 | End: 2025-07-17

## 2025-07-17 RX ORDER — SODIUM CHLORIDE 0.9 % (FLUSH) 0.9 %
5-40 SYRINGE (ML) INJECTION ONCE
Status: COMPLETED | OUTPATIENT
Start: 2025-07-17 | End: 2025-07-17

## 2025-07-17 RX ORDER — FUROSEMIDE 10 MG/ML
120 INJECTION INTRAMUSCULAR; INTRAVENOUS ONCE
Status: CANCELLED
Start: 2025-07-24 | End: 2025-07-24

## 2025-07-17 RX ORDER — SODIUM CHLORIDE 0.9 % (FLUSH) 0.9 %
5-40 SYRINGE (ML) INJECTION ONCE
Status: CANCELLED
Start: 2025-07-24 | End: 2025-07-24

## 2025-07-17 RX ADMIN — FUROSEMIDE 20 MG/HR: 10 INJECTION, SOLUTION INTRAMUSCULAR; INTRAVENOUS at 10:10

## 2025-07-17 RX ADMIN — SODIUM CHLORIDE, PRESERVATIVE FREE 10 ML: 5 INJECTION INTRAVENOUS at 10:08

## 2025-07-17 RX ADMIN — FUROSEMIDE 120 MG: 10 INJECTION, SOLUTION INTRAMUSCULAR; INTRAVENOUS at 10:07

## 2025-07-21 ENCOUNTER — OFFICE VISIT (OUTPATIENT)
Dept: INTERNAL MEDICINE CLINIC | Age: 55
End: 2025-07-21
Payer: COMMERCIAL

## 2025-07-21 VITALS
WEIGHT: 210 LBS | HEART RATE: 82 BPM | HEIGHT: 64 IN | BODY MASS INDEX: 35.85 KG/M2 | DIASTOLIC BLOOD PRESSURE: 68 MMHG | SYSTOLIC BLOOD PRESSURE: 122 MMHG | OXYGEN SATURATION: 93 %

## 2025-07-21 DIAGNOSIS — E11.69 TYPE 2 DIABETES MELLITUS WITH OTHER SPECIFIED COMPLICATION, WITH LONG-TERM CURRENT USE OF INSULIN (HCC): ICD-10-CM

## 2025-07-21 DIAGNOSIS — N32.81 OAB (OVERACTIVE BLADDER): ICD-10-CM

## 2025-07-21 DIAGNOSIS — R33.9 URINE RETENTION: ICD-10-CM

## 2025-07-21 DIAGNOSIS — Z79.4 TYPE 2 DIABETES MELLITUS WITH OTHER SPECIFIED COMPLICATION, WITH LONG-TERM CURRENT USE OF INSULIN (HCC): ICD-10-CM

## 2025-07-21 DIAGNOSIS — M51.362 DEGENERATION OF INTERVERTEBRAL DISC OF LUMBAR REGION WITH DISCOGENIC BACK PAIN AND LOWER EXTREMITY PAIN: Primary | ICD-10-CM

## 2025-07-21 DIAGNOSIS — G89.4 CHRONIC PAIN SYNDROME: Chronic | ICD-10-CM

## 2025-07-21 DIAGNOSIS — N18.32 STAGE 3B CHRONIC KIDNEY DISEASE (HCC): ICD-10-CM

## 2025-07-21 DIAGNOSIS — I50.32 CHRONIC DIASTOLIC HEART FAILURE (HCC): ICD-10-CM

## 2025-07-21 PROCEDURE — 3051F HG A1C>EQUAL 7.0%<8.0%: CPT | Performed by: INTERNAL MEDICINE

## 2025-07-21 PROCEDURE — 99214 OFFICE O/P EST MOD 30 MIN: CPT | Performed by: INTERNAL MEDICINE

## 2025-07-21 PROCEDURE — 3017F COLORECTAL CA SCREEN DOC REV: CPT | Performed by: INTERNAL MEDICINE

## 2025-07-21 PROCEDURE — 1036F TOBACCO NON-USER: CPT | Performed by: INTERNAL MEDICINE

## 2025-07-21 PROCEDURE — G8427 DOCREV CUR MEDS BY ELIG CLIN: HCPCS | Performed by: INTERNAL MEDICINE

## 2025-07-21 PROCEDURE — 3078F DIAST BP <80 MM HG: CPT | Performed by: INTERNAL MEDICINE

## 2025-07-21 PROCEDURE — 3074F SYST BP LT 130 MM HG: CPT | Performed by: INTERNAL MEDICINE

## 2025-07-21 PROCEDURE — G8417 CALC BMI ABV UP PARAM F/U: HCPCS | Performed by: INTERNAL MEDICINE

## 2025-07-21 PROCEDURE — 2022F DILAT RTA XM EVC RTNOPTHY: CPT | Performed by: INTERNAL MEDICINE

## 2025-07-21 RX ORDER — BETHANECHOL CHLORIDE 25 MG/1
25 TABLET ORAL 2 TIMES DAILY
Qty: 60 TABLET | Refills: 3 | Status: SHIPPED | OUTPATIENT
Start: 2025-07-21

## 2025-07-21 NOTE — PROGRESS NOTES
powder TAKE 17 GRAMS (1 CAPFUL) BY MOUTH NIGHTLY 510 g 0    clopidogrel (PLAVIX) 75 MG tablet Take 1 tablet by mouth daily 30 tablet 3    Compression Sleeves Left arm 2 each 0    Elastic Bandages & Supports (COMPRESSION & SUPPORT GLOVES L) MISC 1 each by Does not apply route daily Left hand 2 each 0    Elastic Bandages & Supports (MEDICAL COMPRESSION STOCKINGS) MISC 2 each by Does not apply route daily On am/off HS. 2 each 0    Urea 40 % LOTN Apply to feet nightly and cover with Aquaphor 325 mL 5    SM SENNA-S 8.6-50 MG per tablet TAKE 2 TABLETS BY MOUTH TWO TIMES A DAY (Patient taking differently: Take 2 tablets by mouth 2 times daily) 360 tablet 5    Continuous Blood Gluc Sensor (FREESTYLE EMERALD 2 SENSOR) MISC Change every 14 days 2 each 5    Blood Glucose Monitoring Suppl (ONETOUCH VERIO) w/Device KIT Use to check glucose 4 times daily. 1 kit 0    Compression Stockings MISC by Does not apply route Zippered stockings for daily use on lower extremities (do not wear at night). 20-30mmHg. 1 each 1    Continuous Blood Gluc  (FREESTYLE EMERALD 2 READER) MINDY To check glucose levels 1 each 0    brexpiprazole (REXULTI) 4 MG TABS tablet Take 1 tablet by mouth at bedtime      loratadine (CLARITIN) 10 MG tablet TAKE 1 TABLET BY MOUTH ONE TIME A DAY  30 tablet 5    OXYGEN Inhale 2 L into the lungs continuous Sometimes with activity      oxyCODONE (OXYCONTIN) 20 MG extended release tablet Take 1 tablet by mouth in the morning and 1 tablet in the evening.      aspirin 81 MG EC tablet Take 1 tablet by mouth daily      benztropine (COGENTIN) 2 MG tablet Take 1 tablet by mouth nightly      duloxetine (CYMBALTA) 60 MG capsule Take 1 capsule by mouth 2 times daily      bethanechol (URECHOLINE) 25 MG tablet Take 1 tablet by mouth in the morning and at bedtime 60 tablet 0     No facility-administered medications prior to visit.       Patient'spast medical history, surgical history, family history, medications,  and

## 2025-07-23 ENCOUNTER — RESULTS FOLLOW-UP (OUTPATIENT)
Dept: CARDIOLOGY CLINIC | Age: 55
End: 2025-07-23

## 2025-07-23 DIAGNOSIS — R06.02 SHORTNESS OF BREATH: ICD-10-CM

## 2025-07-23 DIAGNOSIS — N18.30 STAGE 3 CHRONIC KIDNEY DISEASE, UNSPECIFIED WHETHER STAGE 3A OR 3B CKD (HCC): Primary | ICD-10-CM

## 2025-07-23 DIAGNOSIS — I50.32 CHRONIC DIASTOLIC CONGESTIVE HEART FAILURE (HCC): ICD-10-CM

## 2025-07-23 NOTE — TELEPHONE ENCOUNTER
----- Message from Dr. Jessa Parsons MD sent at 7/23/2025  1:48 PM EDT -----  Please call her  and make sure he gets this message.  ARETHA Maria, your echo looks unchanged from previous.  Normal heart function, normal size.  Heart valves look normal.  No changes.  Jessa Parsons

## 2025-07-23 NOTE — TELEPHONE ENCOUNTER
Spoke to pt's  regarding Echo results.  He asked why if the heart is so good, does she need get so swollen.  Explained to  that pt has never truly followed the \"normal\" HF pathway.     He states he was told to think about a kidney specialist per ARETHA and he would like to see a female doctor.   Referral placed for Dr. Romario Cortez.  Faxed referral to [phone] after call made to their office. Received successful fax response.

## 2025-07-24 ENCOUNTER — HOSPITAL ENCOUNTER (OUTPATIENT)
Dept: ONCOLOGY | Age: 55
Setting detail: INFUSION SERIES
Discharge: HOME | End: 2025-07-24
Payer: COMMERCIAL

## 2025-07-24 VITALS
HEIGHT: 64 IN | TEMPERATURE: 96.7 F | BODY MASS INDEX: 35.52 KG/M2 | WEIGHT: 208.04 LBS | DIASTOLIC BLOOD PRESSURE: 69 MMHG | RESPIRATION RATE: 18 BRPM | SYSTOLIC BLOOD PRESSURE: 128 MMHG | HEART RATE: 70 BPM

## 2025-07-24 DIAGNOSIS — I50.32 CHRONIC HEART FAILURE WITH PRESERVED EJECTION FRACTION (HCC): ICD-10-CM

## 2025-07-24 DIAGNOSIS — R06.02 SHORTNESS OF BREATH: ICD-10-CM

## 2025-07-24 DIAGNOSIS — I50.32 CHRONIC DIASTOLIC CONGESTIVE HEART FAILURE (HCC): Primary | ICD-10-CM

## 2025-07-24 LAB
ANION GAP SERPL CALCULATED.3IONS-SCNC: 13 MMOL/L (ref 3–16)
BUN SERPL-MCNC: 64 MG/DL (ref 7–20)
CALCIUM SERPL-MCNC: 9.4 MG/DL (ref 8.3–10.6)
CHLORIDE SERPL-SCNC: 92 MMOL/L (ref 99–110)
CO2 SERPL-SCNC: 32 MMOL/L (ref 21–32)
CREAT SERPL-MCNC: 1.6 MG/DL (ref 0.6–1.1)
GFR SERPLBLD CREATININE-BSD FMLA CKD-EPI: 38 ML/MIN/{1.73_M2}
GLUCOSE SERPL-MCNC: 213 MG/DL (ref 70–99)
NT-PROBNP SERPL-MCNC: 168 PG/ML (ref 0–124)
POTASSIUM SERPL-SCNC: 4.6 MMOL/L (ref 3.5–5.1)
SODIUM SERPL-SCNC: 137 MMOL/L (ref 136–145)

## 2025-07-24 PROCEDURE — 2580000003 HC RX 258: Performed by: NURSE PRACTITIONER

## 2025-07-24 PROCEDURE — 99211 OFF/OP EST MAY X REQ PHY/QHP: CPT

## 2025-07-24 PROCEDURE — 96365 THER/PROPH/DIAG IV INF INIT: CPT

## 2025-07-24 PROCEDURE — 96366 THER/PROPH/DIAG IV INF ADDON: CPT

## 2025-07-24 PROCEDURE — 96375 TX/PRO/DX INJ NEW DRUG ADDON: CPT

## 2025-07-24 PROCEDURE — 80048 BASIC METABOLIC PNL TOTAL CA: CPT

## 2025-07-24 PROCEDURE — 6360000002 HC RX W HCPCS: Performed by: NURSE PRACTITIONER

## 2025-07-24 PROCEDURE — 2500000003 HC RX 250 WO HCPCS: Performed by: NURSE PRACTITIONER

## 2025-07-24 PROCEDURE — 83880 ASSAY OF NATRIURETIC PEPTIDE: CPT

## 2025-07-24 RX ORDER — SODIUM CHLORIDE 0.9 % (FLUSH) 0.9 %
5-40 SYRINGE (ML) INJECTION ONCE
Status: CANCELLED
Start: 2025-07-31 | End: 2025-07-31

## 2025-07-24 RX ORDER — SODIUM CHLORIDE 0.9 % (FLUSH) 0.9 %
5-40 SYRINGE (ML) INJECTION ONCE
Status: COMPLETED | OUTPATIENT
Start: 2025-07-24 | End: 2025-07-24

## 2025-07-24 RX ORDER — FUROSEMIDE 10 MG/ML
120 INJECTION INTRAMUSCULAR; INTRAVENOUS ONCE
Status: COMPLETED | OUTPATIENT
Start: 2025-07-24 | End: 2025-07-24

## 2025-07-24 RX ORDER — FUROSEMIDE 10 MG/ML
120 INJECTION INTRAMUSCULAR; INTRAVENOUS ONCE
Status: CANCELLED
Start: 2025-07-31 | End: 2025-07-31

## 2025-07-24 RX ADMIN — FUROSEMIDE 120 MG: 10 INJECTION, SOLUTION INTRAMUSCULAR; INTRAVENOUS at 10:31

## 2025-07-24 RX ADMIN — FUROSEMIDE 20 MG/HR: 10 INJECTION, SOLUTION INTRAMUSCULAR; INTRAVENOUS at 10:06

## 2025-07-24 RX ADMIN — Medication 10 ML: at 09:27

## 2025-07-24 NOTE — PROGRESS NOTES
Labs reviewed prior to infusion, order parameters to hold if potassium under 3 or creatinine above 1.6 Spoke with Yuliana BARNHART from cardiology that Creatinine is 1.6. Noted OK to proceed with lasix treatment as ordered.. mg Lasix given over 6 minutes, followed by Lasix infusion 20 mg/hr over to be given over 4 hours, see MAR. Pt tolerating well. No needs voiced at this time. AVS reviewed with most common side effects. Pt denied need for hard copy. Pt discharged to car per walker on wheels accompanied by .

## 2025-07-24 NOTE — PROGRESS NOTES
Labs reviewed prior to infusion, order parameters to hold if potassium under 3 or creatinine above 1.6.   mg Lasix given over 6 minutes, followed by Lasix infusion 20 mg/hr over to be given over 4 hours, see MAR. Pt tolerating well. No needs voiced at this time. AVS reviewed with most common side effects. Pt denied need for hard copy. Pt discharged to car per walker on wheels accompanied by .

## 2025-07-26 PROBLEM — L85.3 DRY SKIN: Status: RESOLVED | Noted: 2024-01-23 | Resolved: 2025-07-26

## 2025-07-26 PROBLEM — R40.0 DROWSINESS: Status: RESOLVED | Noted: 2025-01-08 | Resolved: 2025-07-26

## 2025-07-26 PROBLEM — R68.2 DRY MOUTH: Status: RESOLVED | Noted: 2025-01-08 | Resolved: 2025-07-26

## 2025-07-28 ENCOUNTER — HOSPITAL ENCOUNTER (OUTPATIENT)
Dept: WOUND CARE | Age: 55
Discharge: HOME | End: 2025-07-28
Attending: PODIATRIST
Payer: COMMERCIAL

## 2025-07-28 DIAGNOSIS — L97.422 CHRONIC HEEL ULCER, LEFT, WITH FAT LAYER EXPOSED (HCC): Primary | ICD-10-CM

## 2025-07-28 PROCEDURE — 99213 OFFICE O/P EST LOW 20 MIN: CPT

## 2025-07-28 RX ORDER — UREA 40 G/100G
LOTION TOPICAL
Qty: 325 ML | Refills: 1 | Status: SHIPPED | OUTPATIENT
Start: 2025-07-28

## 2025-07-28 RX ORDER — GINSENG 100 MG
CAPSULE ORAL PRN
Status: CANCELLED | OUTPATIENT
Start: 2025-07-28

## 2025-07-28 RX ORDER — SODIUM CHLOR/HYPOCHLOROUS ACID 0.033 %
SOLUTION, IRRIGATION IRRIGATION PRN
Status: CANCELLED | OUTPATIENT
Start: 2025-07-28

## 2025-07-28 RX ORDER — SILVER SULFADIAZINE 10 MG/G
CREAM TOPICAL PRN
Status: CANCELLED | OUTPATIENT
Start: 2025-07-28

## 2025-07-28 RX ORDER — LIDOCAINE 50 MG/G
OINTMENT TOPICAL PRN
Status: CANCELLED | OUTPATIENT
Start: 2025-07-28

## 2025-07-28 RX ORDER — LIDOCAINE HYDROCHLORIDE 20 MG/ML
JELLY TOPICAL PRN
Status: CANCELLED | OUTPATIENT
Start: 2025-07-28

## 2025-07-28 RX ORDER — CLOBETASOL PROPIONATE 0.5 MG/G
OINTMENT TOPICAL PRN
Status: CANCELLED | OUTPATIENT
Start: 2025-07-28

## 2025-07-28 RX ORDER — LIDOCAINE HYDROCHLORIDE 40 MG/ML
SOLUTION TOPICAL PRN
Status: CANCELLED | OUTPATIENT
Start: 2025-07-28

## 2025-07-28 RX ORDER — GENTAMICIN SULFATE 1 MG/G
OINTMENT TOPICAL PRN
Status: CANCELLED | OUTPATIENT
Start: 2025-07-28

## 2025-07-28 RX ORDER — TRIAMCINOLONE ACETONIDE 1 MG/G
OINTMENT TOPICAL PRN
Status: CANCELLED | OUTPATIENT
Start: 2025-07-28

## 2025-07-28 RX ORDER — LIDOCAINE 40 MG/G
CREAM TOPICAL PRN
Status: CANCELLED | OUTPATIENT
Start: 2025-07-28

## 2025-07-28 RX ORDER — BETAMETHASONE DIPROPIONATE 0.5 MG/G
CREAM TOPICAL PRN
Status: CANCELLED | OUTPATIENT
Start: 2025-07-28

## 2025-07-28 RX ORDER — MUPIROCIN 2 %
OINTMENT (GRAM) TOPICAL PRN
Status: CANCELLED | OUTPATIENT
Start: 2025-07-28

## 2025-07-28 RX ORDER — LIDOCAINE 40 MG/G
CREAM TOPICAL PRN
Status: DISCONTINUED | OUTPATIENT
Start: 2025-07-28 | End: 2025-07-29 | Stop reason: HOSPADM

## 2025-07-28 RX ORDER — BACITRACIN ZINC AND POLYMYXIN B SULFATE 500; 1000 [USP'U]/G; [USP'U]/G
OINTMENT TOPICAL PRN
Status: CANCELLED | OUTPATIENT
Start: 2025-07-28

## 2025-07-28 RX ORDER — NEOMYCIN/BACITRACIN/POLYMYXINB 3.5-400-5K
OINTMENT (GRAM) TOPICAL PRN
Status: CANCELLED | OUTPATIENT
Start: 2025-07-28

## 2025-07-28 NOTE — PLAN OF CARE
Discharge instructions given.  Patient verbalized understanding.  Return to [facility] as needed  Wound healed

## 2025-07-28 NOTE — PATIENT INSTRUCTIONS
Ammonium Lactate in office at home use Urea to dry skin on foot. Follow up in Dr Lenz's office    Congratulations! You have completed the wound care program.  How can I prevent Diabetic wounds from occurring again?    Foot Care:   Never go barefoot, indoors or outdoors. (If you have neuropathy, you may not feel and injury to your feet if ir occurs)  Inspect your feet daily. Use a mirror and check for any wounds, redness, or skin cracks. Make sure to inspect between toes. (If you have vision problems, ask a family member or friend to look at your feet for you)  Wash your feet daily. Test the water with you forearm to make sure it isn't too hot. (With neuropathy, you may not be able to tell the temperature of the water with your feet). Dry your feet thoroughly after washing.   Do not soak your feet unless your physician tells you to.  Avoid exposure to extreme temperatures.  Moisturize your legs and feet daily to avoid skin cracks. Do not get lotion between your toes.    Foot Wear:  The first thing to go into your shoe each morning is your hand to check for any object that could injure your foot.  Do not wear tight fitting shoes. Make sure there is plenty of room for your toes.  If you are diabetic; ask your physician about specially made footwear or orthotics.  Never wear new shoes more than 2 hours at a time.  Wear clean cotton socks with smooth or no seams, or diabetic socks.    Follow up with your physician:  Have toenails trimmed by your Podiatrist regularly.  Keep your blood sugars at the level your physician recommends.  Call the Wound Care Center if your wound reopens or new wounds occur or problems at [phone].

## 2025-07-28 NOTE — PROGRESS NOTES
[facility]  Progress Note and Procedure Note      Crow Bonner  AGE: 55 y.o.   GENDER: female  : 1970  TODAY'S DATE:  2025    Subjective:     Chief Complaint   Patient presents with    Wound Check     LLE f/u         HISTORY of PRESENT ILLNESS HPI     Crow Bonner is a 55 y.o. female who presents today for wound evaluation.   History of Wound: L heel wound with dry cracked skin.   Wound Pain:  intermittent  Severity:  3 / 10   Wound Type:  diabetic  Modifying Factors:  diabetes, chronic pressure, decreased mobility, and shear force  Associated Signs/Symptoms:  numbness        PAST MEDICAL HISTORY        Diagnosis Date    Acute CVA (cerebrovascular accident) ([facility]) 2023    Anxiety     Anxiety and depression     Arthritis     not sure of specific type    Asthma     CAD (coronary artery disease)     Cancer ([facility])     left breast    Cerebral artery occlusion with cerebral infarction ([facility])     ,     CHF (congestive heart failure) ([facility])     Chronic kidney disease     renal insufficency/to see Dr Romario Saeed    Chronic pain     Constipation     COPD (chronic obstructive pulmonary disease) ([facility])     Depression     Diabetes mellitus ([facility])     Diabetic polyneuropathy associated with type 2 diabetes mellitus ([facility]) 2018    Dysthymia 2018    ESBL (extended spectrum beta-lactamase) producing bacteria infection 2018    urine    Fibromyalgia     Gastric ulcer, unspecified as acute or chronic, without mention of hemorrhage, perforation, or obstruction     GERD (gastroesophageal reflux disease)     Gout     HIGH CHOLESTEROL     Hypertension     Hypothyroidism     Pneumonia 2020    Pyelonephritis     Severe persistent asthma without complication ([facility]) 2018    Thyroid disease     TIA (transient ischemic attack)     with occasional left leg and hand weakness       PAST SURGICAL HISTORY    Past Surgical History:   Procedure Laterality Date

## 2025-07-31 ENCOUNTER — HOSPITAL ENCOUNTER (OUTPATIENT)
Dept: ONCOLOGY | Age: 55
Setting detail: INFUSION SERIES
Discharge: HOME | End: 2025-07-31
Payer: COMMERCIAL

## 2025-07-31 VITALS
WEIGHT: 207.2 LBS | TEMPERATURE: 98 F | HEART RATE: 71 BPM | BODY MASS INDEX: 35.57 KG/M2 | SYSTOLIC BLOOD PRESSURE: 118 MMHG | DIASTOLIC BLOOD PRESSURE: 69 MMHG | RESPIRATION RATE: 16 BRPM

## 2025-07-31 DIAGNOSIS — I50.32 CHRONIC DIASTOLIC CONGESTIVE HEART FAILURE (HCC): Primary | ICD-10-CM

## 2025-07-31 DIAGNOSIS — R06.02 SHORTNESS OF BREATH: ICD-10-CM

## 2025-07-31 DIAGNOSIS — I50.32 CHRONIC HEART FAILURE WITH PRESERVED EJECTION FRACTION (HCC): ICD-10-CM

## 2025-07-31 LAB
ANION GAP SERPL CALCULATED.3IONS-SCNC: 14 MMOL/L (ref 3–16)
BUN SERPL-MCNC: 54 MG/DL (ref 7–20)
CALCIUM SERPL-MCNC: 9.8 MG/DL (ref 8.3–10.6)
CHLORIDE SERPL-SCNC: 96 MMOL/L (ref 99–110)
CO2 SERPL-SCNC: 31 MMOL/L (ref 21–32)
CREAT SERPL-MCNC: 1.4 MG/DL (ref 0.6–1.1)
GFR SERPLBLD CREATININE-BSD FMLA CKD-EPI: 44 ML/MIN/{1.73_M2}
GLUCOSE SERPL-MCNC: 207 MG/DL (ref 70–99)
NT-PROBNP SERPL-MCNC: 287 PG/ML (ref 0–124)
POTASSIUM SERPL-SCNC: 4.3 MMOL/L (ref 3.5–5.1)
SODIUM SERPL-SCNC: 141 MMOL/L (ref 136–145)

## 2025-07-31 PROCEDURE — 80048 BASIC METABOLIC PNL TOTAL CA: CPT

## 2025-07-31 PROCEDURE — 96365 THER/PROPH/DIAG IV INF INIT: CPT

## 2025-07-31 PROCEDURE — 96366 THER/PROPH/DIAG IV INF ADDON: CPT

## 2025-07-31 PROCEDURE — 99211 OFF/OP EST MAY X REQ PHY/QHP: CPT

## 2025-07-31 PROCEDURE — 6360000002 HC RX W HCPCS: Performed by: NURSE PRACTITIONER

## 2025-07-31 PROCEDURE — 83880 ASSAY OF NATRIURETIC PEPTIDE: CPT

## 2025-07-31 PROCEDURE — 2580000003 HC RX 258: Performed by: NURSE PRACTITIONER

## 2025-07-31 PROCEDURE — 2500000003 HC RX 250 WO HCPCS: Performed by: NURSE PRACTITIONER

## 2025-07-31 PROCEDURE — 96375 TX/PRO/DX INJ NEW DRUG ADDON: CPT

## 2025-07-31 RX ORDER — FUROSEMIDE 10 MG/ML
120 INJECTION INTRAMUSCULAR; INTRAVENOUS ONCE
Status: COMPLETED | OUTPATIENT
Start: 2025-07-31 | End: 2025-07-31

## 2025-07-31 RX ORDER — FUROSEMIDE 10 MG/ML
120 INJECTION INTRAMUSCULAR; INTRAVENOUS ONCE
Start: 2025-08-07 | End: 2025-08-07

## 2025-07-31 RX ORDER — SODIUM CHLORIDE 0.9 % (FLUSH) 0.9 %
5-40 SYRINGE (ML) INJECTION ONCE
Start: 2025-08-07 | End: 2025-08-07

## 2025-07-31 RX ORDER — SODIUM CHLORIDE 0.9 % (FLUSH) 0.9 %
5-40 SYRINGE (ML) INJECTION ONCE
Status: COMPLETED | OUTPATIENT
Start: 2025-07-31 | End: 2025-07-31

## 2025-07-31 RX ADMIN — Medication 10 ML: at 10:03

## 2025-07-31 RX ADMIN — FUROSEMIDE 20 MG/HR: 10 INJECTION, SOLUTION INTRAMUSCULAR; INTRAVENOUS at 10:03

## 2025-07-31 RX ADMIN — FUROSEMIDE 120 MG: 10 INJECTION, SOLUTION INTRAMUSCULAR; INTRAVENOUS at 09:56

## 2025-07-31 NOTE — PROGRESS NOTES
Labs reviewed prior to infusion, order parameters to hold if potassium under 3 or creatinine above 1.6.   mg Lasix given over 6 minutes, followed by Lasix infusion 20 mg/hr over to be given over 4 hours, see MAR. Pt tolerating well. No needs voiced at this time. AVS reviewed with most common side effects. Pt denied need for hard copy. Pt discharged to car per wheelchair accompanied by .

## 2025-08-06 ENCOUNTER — HOSPITAL ENCOUNTER (OUTPATIENT)
Dept: ONCOLOGY | Age: 55
Setting detail: INFUSION SERIES
Discharge: HOME | End: 2025-08-06
Payer: MEDICAID

## 2025-08-06 VITALS
WEIGHT: 209 LBS | DIASTOLIC BLOOD PRESSURE: 89 MMHG | SYSTOLIC BLOOD PRESSURE: 139 MMHG | TEMPERATURE: 98.1 F | BODY MASS INDEX: 35.87 KG/M2 | RESPIRATION RATE: 16 BRPM | HEART RATE: 64 BPM

## 2025-08-06 DIAGNOSIS — I50.32 CHRONIC HEART FAILURE WITH PRESERVED EJECTION FRACTION (HCC): ICD-10-CM

## 2025-08-06 DIAGNOSIS — I50.32 CHRONIC DIASTOLIC CONGESTIVE HEART FAILURE (HCC): Primary | ICD-10-CM

## 2025-08-06 DIAGNOSIS — R06.02 SHORTNESS OF BREATH: ICD-10-CM

## 2025-08-06 LAB
ANION GAP SERPL CALCULATED.3IONS-SCNC: 13 MMOL/L (ref 3–16)
BUN SERPL-MCNC: 56 MG/DL (ref 7–20)
CALCIUM SERPL-MCNC: 10 MG/DL (ref 8.3–10.6)
CHLORIDE SERPL-SCNC: 89 MMOL/L (ref 99–110)
CO2 SERPL-SCNC: 31 MMOL/L (ref 21–32)
CREAT SERPL-MCNC: 1.6 MG/DL (ref 0.6–1.1)
GFR SERPLBLD CREATININE-BSD FMLA CKD-EPI: 38 ML/MIN/{1.73_M2}
GLUCOSE SERPL-MCNC: 183 MG/DL (ref 70–99)
NT-PROBNP SERPL-MCNC: 183 PG/ML (ref 0–124)
POTASSIUM SERPL-SCNC: 4.8 MMOL/L (ref 3.5–5.1)
SODIUM SERPL-SCNC: 133 MMOL/L (ref 136–145)

## 2025-08-06 PROCEDURE — 96375 TX/PRO/DX INJ NEW DRUG ADDON: CPT

## 2025-08-06 PROCEDURE — 80048 BASIC METABOLIC PNL TOTAL CA: CPT

## 2025-08-06 PROCEDURE — 2500000003 HC RX 250 WO HCPCS: Performed by: NURSE PRACTITIONER

## 2025-08-06 PROCEDURE — 83880 ASSAY OF NATRIURETIC PEPTIDE: CPT

## 2025-08-06 PROCEDURE — 6360000002 HC RX W HCPCS: Performed by: NURSE PRACTITIONER

## 2025-08-06 PROCEDURE — 99211 OFF/OP EST MAY X REQ PHY/QHP: CPT

## 2025-08-06 PROCEDURE — 96365 THER/PROPH/DIAG IV INF INIT: CPT

## 2025-08-06 PROCEDURE — 2580000003 HC RX 258: Performed by: NURSE PRACTITIONER

## 2025-08-06 PROCEDURE — 96366 THER/PROPH/DIAG IV INF ADDON: CPT

## 2025-08-06 RX ORDER — SODIUM CHLORIDE 0.9 % (FLUSH) 0.9 %
5-40 SYRINGE (ML) INJECTION ONCE
Status: CANCELLED
Start: 2025-08-07 | End: 2025-08-07

## 2025-08-06 RX ORDER — FUROSEMIDE 10 MG/ML
120 INJECTION INTRAMUSCULAR; INTRAVENOUS ONCE
Status: COMPLETED | OUTPATIENT
Start: 2025-08-06 | End: 2025-08-06

## 2025-08-06 RX ORDER — SODIUM CHLORIDE 0.9 % (FLUSH) 0.9 %
5-40 SYRINGE (ML) INJECTION ONCE
Status: COMPLETED | OUTPATIENT
Start: 2025-08-06 | End: 2025-08-06

## 2025-08-06 RX ORDER — FUROSEMIDE 10 MG/ML
120 INJECTION INTRAMUSCULAR; INTRAVENOUS ONCE
Status: CANCELLED
Start: 2025-08-07 | End: 2025-08-07

## 2025-08-06 RX ADMIN — SODIUM CHLORIDE, PRESERVATIVE FREE 10 ML: 5 INJECTION INTRAVENOUS at 10:17

## 2025-08-06 RX ADMIN — FUROSEMIDE 120 MG: 10 INJECTION, SOLUTION INTRAMUSCULAR; INTRAVENOUS at 10:13

## 2025-08-06 RX ADMIN — FUROSEMIDE 20 MG/HR: 10 INJECTION, SOLUTION INTRAMUSCULAR; INTRAVENOUS at 10:36

## 2025-08-09 DIAGNOSIS — Z79.4 TYPE 2 DIABETES MELLITUS WITH OTHER CIRCULATORY COMPLICATION, WITH LONG-TERM CURRENT USE OF INSULIN (HCC): ICD-10-CM

## 2025-08-09 DIAGNOSIS — E11.59 TYPE 2 DIABETES MELLITUS WITH OTHER CIRCULATORY COMPLICATION, WITH LONG-TERM CURRENT USE OF INSULIN (HCC): ICD-10-CM

## 2025-08-11 RX ORDER — TIRZEPATIDE 2.5 MG/.5ML
2.5 INJECTION, SOLUTION SUBCUTANEOUS
Qty: 2 ML | Refills: 0 | Status: SHIPPED | OUTPATIENT
Start: 2025-08-11

## 2025-08-14 ENCOUNTER — HOSPITAL ENCOUNTER (OUTPATIENT)
Dept: ONCOLOGY | Age: 55
Setting detail: INFUSION SERIES
Discharge: HOME | End: 2025-08-14
Payer: MEDICAID

## 2025-08-14 VITALS
WEIGHT: 209.8 LBS | RESPIRATION RATE: 18 BRPM | TEMPERATURE: 97.3 F | OXYGEN SATURATION: 98 % | DIASTOLIC BLOOD PRESSURE: 74 MMHG | BODY MASS INDEX: 36.01 KG/M2 | SYSTOLIC BLOOD PRESSURE: 133 MMHG | HEART RATE: 68 BPM

## 2025-08-14 DIAGNOSIS — I50.32 CHRONIC HEART FAILURE WITH PRESERVED EJECTION FRACTION (HCC): ICD-10-CM

## 2025-08-14 DIAGNOSIS — I50.32 CHRONIC DIASTOLIC CONGESTIVE HEART FAILURE (HCC): Primary | ICD-10-CM

## 2025-08-14 DIAGNOSIS — R06.02 SHORTNESS OF BREATH: ICD-10-CM

## 2025-08-14 LAB
ANION GAP SERPL CALCULATED.3IONS-SCNC: 12 MMOL/L (ref 3–16)
BUN SERPL-MCNC: 38 MG/DL (ref 7–20)
CALCIUM SERPL-MCNC: 9.4 MG/DL (ref 8.3–10.6)
CHLORIDE SERPL-SCNC: 93 MMOL/L (ref 99–110)
CO2 SERPL-SCNC: 33 MMOL/L (ref 21–32)
CREAT SERPL-MCNC: 1.4 MG/DL (ref 0.6–1.1)
GFR SERPLBLD CREATININE-BSD FMLA CKD-EPI: 44 ML/MIN/{1.73_M2}
GLUCOSE SERPL-MCNC: 383 MG/DL (ref 70–99)
NT-PROBNP SERPL-MCNC: 194 PG/ML (ref 0–124)
POTASSIUM SERPL-SCNC: 3.9 MMOL/L (ref 3.5–5.1)
SODIUM SERPL-SCNC: 138 MMOL/L (ref 136–145)

## 2025-08-14 PROCEDURE — 99211 OFF/OP EST MAY X REQ PHY/QHP: CPT

## 2025-08-14 PROCEDURE — 83880 ASSAY OF NATRIURETIC PEPTIDE: CPT

## 2025-08-14 PROCEDURE — 96375 TX/PRO/DX INJ NEW DRUG ADDON: CPT

## 2025-08-14 PROCEDURE — 2580000003 HC RX 258: Performed by: NURSE PRACTITIONER

## 2025-08-14 PROCEDURE — 96366 THER/PROPH/DIAG IV INF ADDON: CPT

## 2025-08-14 PROCEDURE — 80048 BASIC METABOLIC PNL TOTAL CA: CPT

## 2025-08-14 PROCEDURE — 96365 THER/PROPH/DIAG IV INF INIT: CPT

## 2025-08-14 PROCEDURE — 2500000003 HC RX 250 WO HCPCS: Performed by: NURSE PRACTITIONER

## 2025-08-14 PROCEDURE — 6360000002 HC RX W HCPCS: Performed by: NURSE PRACTITIONER

## 2025-08-14 RX ORDER — SODIUM CHLORIDE 0.9 % (FLUSH) 0.9 %
5-40 SYRINGE (ML) INJECTION ONCE
Status: CANCELLED
Start: 2025-08-21 | End: 2025-08-21

## 2025-08-14 RX ORDER — FUROSEMIDE 10 MG/ML
120 INJECTION INTRAMUSCULAR; INTRAVENOUS ONCE
Status: COMPLETED | OUTPATIENT
Start: 2025-08-14 | End: 2025-08-14

## 2025-08-14 RX ORDER — SODIUM CHLORIDE 0.9 % (FLUSH) 0.9 %
5-40 SYRINGE (ML) INJECTION ONCE
Status: COMPLETED | OUTPATIENT
Start: 2025-08-14 | End: 2025-08-14

## 2025-08-14 RX ORDER — FUROSEMIDE 10 MG/ML
120 INJECTION INTRAMUSCULAR; INTRAVENOUS ONCE
Status: CANCELLED
Start: 2025-08-21 | End: 2025-08-21

## 2025-08-14 RX ADMIN — FUROSEMIDE 20 MG/HR: 10 INJECTION, SOLUTION INTRAMUSCULAR; INTRAVENOUS at 10:24

## 2025-08-14 RX ADMIN — Medication 10 ML: at 10:17

## 2025-08-14 RX ADMIN — FUROSEMIDE 120 MG: 10 INJECTION, SOLUTION INTRAMUSCULAR; INTRAVENOUS at 10:18

## 2025-08-20 ENCOUNTER — HOSPITAL ENCOUNTER (OUTPATIENT)
Dept: ONCOLOGY | Age: 55
Setting detail: INFUSION SERIES
Discharge: HOME | End: 2025-08-20
Payer: MEDICAID

## 2025-08-20 VITALS
BODY MASS INDEX: 35.22 KG/M2 | WEIGHT: 205.2 LBS | SYSTOLIC BLOOD PRESSURE: 117 MMHG | HEART RATE: 66 BPM | DIASTOLIC BLOOD PRESSURE: 64 MMHG | RESPIRATION RATE: 16 BRPM | TEMPERATURE: 96.6 F

## 2025-08-20 DIAGNOSIS — I50.32 CHRONIC DIASTOLIC CONGESTIVE HEART FAILURE (HCC): Primary | ICD-10-CM

## 2025-08-20 DIAGNOSIS — R06.02 SHORTNESS OF BREATH: ICD-10-CM

## 2025-08-20 DIAGNOSIS — I50.32 CHRONIC HEART FAILURE WITH PRESERVED EJECTION FRACTION (HCC): ICD-10-CM

## 2025-08-20 LAB
ANION GAP SERPL CALCULATED.3IONS-SCNC: 13 MMOL/L (ref 3–16)
BUN SERPL-MCNC: 44 MG/DL (ref 7–20)
CALCIUM SERPL-MCNC: 9.5 MG/DL (ref 8.3–10.6)
CHLORIDE SERPL-SCNC: 93 MMOL/L (ref 99–110)
CO2 SERPL-SCNC: 31 MMOL/L (ref 21–32)
CREAT SERPL-MCNC: 1.6 MG/DL (ref 0.6–1.1)
GFR SERPLBLD CREATININE-BSD FMLA CKD-EPI: 38 ML/MIN/{1.73_M2}
GLUCOSE SERPL-MCNC: 185 MG/DL (ref 70–99)
NT-PROBNP SERPL-MCNC: 182 PG/ML (ref 0–124)
POTASSIUM SERPL-SCNC: 4 MMOL/L (ref 3.5–5.1)
SODIUM SERPL-SCNC: 137 MMOL/L (ref 136–145)

## 2025-08-20 PROCEDURE — 83880 ASSAY OF NATRIURETIC PEPTIDE: CPT

## 2025-08-20 PROCEDURE — 96365 THER/PROPH/DIAG IV INF INIT: CPT

## 2025-08-20 PROCEDURE — 96366 THER/PROPH/DIAG IV INF ADDON: CPT

## 2025-08-20 PROCEDURE — 80048 BASIC METABOLIC PNL TOTAL CA: CPT

## 2025-08-20 PROCEDURE — 96375 TX/PRO/DX INJ NEW DRUG ADDON: CPT

## 2025-08-20 PROCEDURE — 6360000002 HC RX W HCPCS: Performed by: NURSE PRACTITIONER

## 2025-08-20 PROCEDURE — 99211 OFF/OP EST MAY X REQ PHY/QHP: CPT

## 2025-08-20 PROCEDURE — 2580000003 HC RX 258: Performed by: NURSE PRACTITIONER

## 2025-08-20 RX ORDER — SODIUM CHLORIDE 0.9 % (FLUSH) 0.9 %
5-40 SYRINGE (ML) INJECTION ONCE
Status: DISCONTINUED | OUTPATIENT
Start: 2025-08-20 | End: 2025-08-21 | Stop reason: HOSPADM

## 2025-08-20 RX ORDER — FUROSEMIDE 10 MG/ML
120 INJECTION INTRAMUSCULAR; INTRAVENOUS ONCE
Status: CANCELLED
Start: 2025-08-21 | End: 2025-08-21

## 2025-08-20 RX ORDER — FUROSEMIDE 10 MG/ML
120 INJECTION INTRAMUSCULAR; INTRAVENOUS ONCE
Status: COMPLETED | OUTPATIENT
Start: 2025-08-20 | End: 2025-08-20

## 2025-08-20 RX ORDER — SODIUM CHLORIDE 0.9 % (FLUSH) 0.9 %
5-40 SYRINGE (ML) INJECTION ONCE
Status: CANCELLED
Start: 2025-08-21 | End: 2025-08-21

## 2025-08-20 RX ADMIN — FUROSEMIDE 20 MG/HR: 10 INJECTION, SOLUTION INTRAMUSCULAR; INTRAVENOUS at 10:40

## 2025-08-20 RX ADMIN — FUROSEMIDE 120 MG: 10 INJECTION, SOLUTION INTRAMUSCULAR; INTRAVENOUS at 10:33

## 2025-08-27 ENCOUNTER — OFFICE VISIT (OUTPATIENT)
Dept: ENDOCRINOLOGY | Age: 55
End: 2025-08-27
Payer: COMMERCIAL

## 2025-08-27 VITALS
HEART RATE: 65 BPM | DIASTOLIC BLOOD PRESSURE: 64 MMHG | SYSTOLIC BLOOD PRESSURE: 100 MMHG | OXYGEN SATURATION: 98 % | TEMPERATURE: 98 F | BODY MASS INDEX: 35.2 KG/M2 | RESPIRATION RATE: 16 BRPM | HEIGHT: 64 IN

## 2025-08-27 DIAGNOSIS — Z79.4 TYPE 2 DIABETES MELLITUS WITH OTHER SPECIFIED COMPLICATION, WITH LONG-TERM CURRENT USE OF INSULIN (HCC): Primary | ICD-10-CM

## 2025-08-27 DIAGNOSIS — E03.2 HYPOTHYROIDISM DUE TO MEDICATION: ICD-10-CM

## 2025-08-27 DIAGNOSIS — E11.69 TYPE 2 DIABETES MELLITUS WITH OTHER SPECIFIED COMPLICATION, WITH LONG-TERM CURRENT USE OF INSULIN (HCC): Primary | ICD-10-CM

## 2025-08-27 LAB — HBA1C MFR BLD: 7 %

## 2025-08-27 PROCEDURE — 99214 OFFICE O/P EST MOD 30 MIN: CPT | Performed by: INTERNAL MEDICINE

## 2025-08-27 PROCEDURE — 83036 HEMOGLOBIN GLYCOSYLATED A1C: CPT | Performed by: INTERNAL MEDICINE

## 2025-08-27 PROCEDURE — 3078F DIAST BP <80 MM HG: CPT | Performed by: INTERNAL MEDICINE

## 2025-08-27 PROCEDURE — 3074F SYST BP LT 130 MM HG: CPT | Performed by: INTERNAL MEDICINE

## 2025-08-27 PROCEDURE — 3051F HG A1C>EQUAL 7.0%<8.0%: CPT | Performed by: INTERNAL MEDICINE

## 2025-08-28 ENCOUNTER — HOSPITAL ENCOUNTER (OUTPATIENT)
Dept: ONCOLOGY | Age: 55
Setting detail: INFUSION SERIES
Discharge: HOME | End: 2025-08-28
Payer: COMMERCIAL

## 2025-08-28 VITALS
HEART RATE: 66 BPM | DIASTOLIC BLOOD PRESSURE: 64 MMHG | TEMPERATURE: 97.1 F | RESPIRATION RATE: 16 BRPM | SYSTOLIC BLOOD PRESSURE: 112 MMHG

## 2025-08-28 DIAGNOSIS — I50.32 CHRONIC HEART FAILURE WITH PRESERVED EJECTION FRACTION (HCC): ICD-10-CM

## 2025-08-28 DIAGNOSIS — R06.02 SHORTNESS OF BREATH: ICD-10-CM

## 2025-08-28 DIAGNOSIS — I50.32 CHRONIC DIASTOLIC CONGESTIVE HEART FAILURE (HCC): Primary | ICD-10-CM

## 2025-08-28 LAB
ANION GAP SERPL CALCULATED.3IONS-SCNC: 9 MMOL/L (ref 3–16)
BUN SERPL-MCNC: 40 MG/DL (ref 7–20)
CALCIUM SERPL-MCNC: 9.7 MG/DL (ref 8.3–10.6)
CHLORIDE SERPL-SCNC: 95 MMOL/L (ref 99–110)
CO2 SERPL-SCNC: 34 MMOL/L (ref 21–32)
CREAT SERPL-MCNC: 1.3 MG/DL (ref 0.6–1.1)
GFR SERPLBLD CREATININE-BSD FMLA CKD-EPI: 48 ML/MIN/{1.73_M2}
GLUCOSE SERPL-MCNC: 361 MG/DL (ref 70–99)
NT-PROBNP SERPL-MCNC: 291 PG/ML (ref 0–124)
POTASSIUM SERPL-SCNC: 3.7 MMOL/L (ref 3.5–5.1)
SODIUM SERPL-SCNC: 138 MMOL/L (ref 136–145)

## 2025-08-28 PROCEDURE — 2500000003 HC RX 250 WO HCPCS: Performed by: NURSE PRACTITIONER

## 2025-08-28 PROCEDURE — 96365 THER/PROPH/DIAG IV INF INIT: CPT

## 2025-08-28 PROCEDURE — 80048 BASIC METABOLIC PNL TOTAL CA: CPT

## 2025-08-28 PROCEDURE — 6360000002 HC RX W HCPCS: Performed by: NURSE PRACTITIONER

## 2025-08-28 PROCEDURE — 96366 THER/PROPH/DIAG IV INF ADDON: CPT

## 2025-08-28 PROCEDURE — 2580000003 HC RX 258: Performed by: NURSE PRACTITIONER

## 2025-08-28 PROCEDURE — 99211 OFF/OP EST MAY X REQ PHY/QHP: CPT

## 2025-08-28 PROCEDURE — 83880 ASSAY OF NATRIURETIC PEPTIDE: CPT

## 2025-08-28 PROCEDURE — 96375 TX/PRO/DX INJ NEW DRUG ADDON: CPT

## 2025-08-28 RX ORDER — SODIUM CHLORIDE 0.9 % (FLUSH) 0.9 %
5-40 SYRINGE (ML) INJECTION ONCE
Status: COMPLETED | OUTPATIENT
Start: 2025-08-28 | End: 2025-08-28

## 2025-08-28 RX ORDER — FUROSEMIDE 10 MG/ML
120 INJECTION INTRAMUSCULAR; INTRAVENOUS ONCE
Status: CANCELLED
Start: 2025-09-04 | End: 2025-09-04

## 2025-08-28 RX ORDER — SODIUM CHLORIDE 0.9 % (FLUSH) 0.9 %
5-40 SYRINGE (ML) INJECTION ONCE
Status: CANCELLED
Start: 2025-09-04 | End: 2025-09-04

## 2025-08-28 RX ORDER — FUROSEMIDE 10 MG/ML
120 INJECTION INTRAMUSCULAR; INTRAVENOUS ONCE
Status: COMPLETED | OUTPATIENT
Start: 2025-08-28 | End: 2025-08-28

## 2025-08-28 RX ADMIN — Medication 10 ML: at 10:43

## 2025-08-28 RX ADMIN — FUROSEMIDE 20 MG/HR: 10 INJECTION, SOLUTION INTRAMUSCULAR; INTRAVENOUS at 10:49

## 2025-08-28 RX ADMIN — FUROSEMIDE 120 MG: 10 INJECTION, SOLUTION INTRAMUSCULAR; INTRAVENOUS at 10:42

## 2025-09-02 RX ORDER — ACYCLOVIR 800 MG/1
TABLET ORAL
Qty: 6 EACH | Refills: 1 | Status: SHIPPED | OUTPATIENT
Start: 2025-09-02

## 2025-09-03 ENCOUNTER — TELEPHONE (OUTPATIENT)
Dept: ENDOCRINOLOGY | Age: 55
End: 2025-09-03

## 2025-09-03 DIAGNOSIS — E03.2 HYPOTHYROIDISM DUE TO MEDICATION: ICD-10-CM

## 2025-09-03 DIAGNOSIS — E11.59 TYPE 2 DIABETES MELLITUS WITH OTHER CIRCULATORY COMPLICATION, WITH LONG-TERM CURRENT USE OF INSULIN (HCC): ICD-10-CM

## 2025-09-03 DIAGNOSIS — E55.9 VITAMIN D DEFICIENCY: ICD-10-CM

## 2025-09-03 DIAGNOSIS — Z79.4 TYPE 2 DIABETES MELLITUS WITH OTHER CIRCULATORY COMPLICATION, WITH LONG-TERM CURRENT USE OF INSULIN (HCC): ICD-10-CM

## 2025-09-04 ENCOUNTER — HOSPITAL ENCOUNTER (OUTPATIENT)
Dept: ONCOLOGY | Age: 55
Setting detail: INFUSION SERIES
Discharge: HOME | End: 2025-09-04
Payer: COMMERCIAL

## 2025-09-04 VITALS
TEMPERATURE: 97 F | BODY MASS INDEX: 36.89 KG/M2 | HEART RATE: 61 BPM | WEIGHT: 215 LBS | DIASTOLIC BLOOD PRESSURE: 68 MMHG | SYSTOLIC BLOOD PRESSURE: 105 MMHG | RESPIRATION RATE: 20 BRPM

## 2025-09-04 DIAGNOSIS — I50.32 CHRONIC HEART FAILURE WITH PRESERVED EJECTION FRACTION (HCC): ICD-10-CM

## 2025-09-04 DIAGNOSIS — R06.02 SHORTNESS OF BREATH: ICD-10-CM

## 2025-09-04 DIAGNOSIS — I50.32 CHRONIC DIASTOLIC CONGESTIVE HEART FAILURE (HCC): Primary | ICD-10-CM

## 2025-09-04 LAB
ANION GAP SERPL CALCULATED.3IONS-SCNC: 12 MMOL/L (ref 3–16)
BUN SERPL-MCNC: 39 MG/DL (ref 7–20)
CALCIUM SERPL-MCNC: 9.7 MG/DL (ref 8.3–10.6)
CHLORIDE SERPL-SCNC: 95 MMOL/L (ref 99–110)
CO2 SERPL-SCNC: 32 MMOL/L (ref 21–32)
CREAT SERPL-MCNC: 1.4 MG/DL (ref 0.6–1.1)
GFR SERPLBLD CREATININE-BSD FMLA CKD-EPI: 44 ML/MIN/{1.73_M2}
GLUCOSE SERPL-MCNC: 230 MG/DL (ref 70–99)
NT-PROBNP SERPL-MCNC: 276 PG/ML (ref 0–124)
POTASSIUM SERPL-SCNC: 4.3 MMOL/L (ref 3.5–5.1)
SODIUM SERPL-SCNC: 139 MMOL/L (ref 136–145)

## 2025-09-04 PROCEDURE — 2580000003 HC RX 258: Performed by: NURSE PRACTITIONER

## 2025-09-04 PROCEDURE — 80048 BASIC METABOLIC PNL TOTAL CA: CPT

## 2025-09-04 PROCEDURE — 99211 OFF/OP EST MAY X REQ PHY/QHP: CPT

## 2025-09-04 PROCEDURE — 96375 TX/PRO/DX INJ NEW DRUG ADDON: CPT

## 2025-09-04 PROCEDURE — 96366 THER/PROPH/DIAG IV INF ADDON: CPT

## 2025-09-04 PROCEDURE — 83880 ASSAY OF NATRIURETIC PEPTIDE: CPT

## 2025-09-04 PROCEDURE — 96365 THER/PROPH/DIAG IV INF INIT: CPT

## 2025-09-04 PROCEDURE — 6360000002 HC RX W HCPCS: Performed by: NURSE PRACTITIONER

## 2025-09-04 RX ORDER — FUROSEMIDE 10 MG/ML
120 INJECTION INTRAMUSCULAR; INTRAVENOUS ONCE
Start: 2025-09-11 | End: 2025-09-11

## 2025-09-04 RX ORDER — SODIUM CHLORIDE 0.9 % (FLUSH) 0.9 %
5-40 SYRINGE (ML) INJECTION ONCE
Status: DISCONTINUED | OUTPATIENT
Start: 2025-09-04 | End: 2025-09-05 | Stop reason: HOSPADM

## 2025-09-04 RX ORDER — SODIUM CHLORIDE 0.9 % (FLUSH) 0.9 %
5-40 SYRINGE (ML) INJECTION ONCE
Start: 2025-09-11 | End: 2025-09-11

## 2025-09-04 RX ORDER — FUROSEMIDE 10 MG/ML
120 INJECTION INTRAMUSCULAR; INTRAVENOUS ONCE
Status: COMPLETED | OUTPATIENT
Start: 2025-09-04 | End: 2025-09-04

## 2025-09-04 RX ORDER — LEVOTHYROXINE SODIUM 150 UG/1
150 TABLET ORAL
Qty: 90 TABLET | Refills: 0 | Status: SHIPPED | OUTPATIENT
Start: 2025-09-04

## 2025-09-04 RX ADMIN — FUROSEMIDE 120 MG: 10 INJECTION, SOLUTION INTRAMUSCULAR; INTRAVENOUS at 10:34

## 2025-09-04 RX ADMIN — FUROSEMIDE 20 MG/HR: 10 INJECTION, SOLUTION INTRAMUSCULAR; INTRAVENOUS at 10:40
